# Patient Record
Sex: MALE | Race: WHITE | Employment: OTHER | URBAN - METROPOLITAN AREA
[De-identification: names, ages, dates, MRNs, and addresses within clinical notes are randomized per-mention and may not be internally consistent; named-entity substitution may affect disease eponyms.]

---

## 2017-06-08 ENCOUNTER — HOSPITAL ENCOUNTER (OUTPATIENT)
Dept: PHYSICAL THERAPY | Age: 37
Discharge: HOME | End: 2017-06-30
Attending: FAMILY MEDICINE | Admitting: FAMILY MEDICINE

## 2017-06-08 ASSESSMENT — PAIN SCALES - GENERAL: PAINLEVEL_OUTOF10: 6

## 2017-06-08 ASSESSMENT — PAIN DESCRIPTION - DESCRIPTORS: DESCRIPTORS: TINGLING;BURNING

## 2017-06-08 ASSESSMENT — PAIN DESCRIPTION - FREQUENCY: FREQUENCY: INTERMITTENT

## 2017-06-08 ASSESSMENT — PAIN DESCRIPTION - PAIN TYPE: TYPE: ACUTE PAIN

## 2017-06-08 ASSESSMENT — PAIN DESCRIPTION - LOCATION: LOCATION: KNEE;ANKLE;LEG

## 2017-06-08 ASSESSMENT — PAIN DESCRIPTION - ORIENTATION: ORIENTATION: LEFT

## 2017-06-20 ENCOUNTER — HOSPITAL ENCOUNTER (OUTPATIENT)
Dept: PHYSICAL THERAPY | Age: 37
Discharge: HOME | End: 2017-06-20
Admitting: FAMILY MEDICINE

## 2017-07-01 ENCOUNTER — HOSPITAL ENCOUNTER (OUTPATIENT)
Dept: OTHER | Age: 37
Discharge: HOME | End: 2017-07-31
Attending: FAMILY MEDICINE | Admitting: FAMILY MEDICINE

## 2017-08-01 ENCOUNTER — HOSPITAL ENCOUNTER (OUTPATIENT)
Dept: OTHER | Age: 37
Discharge: HOME | End: 2017-08-17
Attending: FAMILY MEDICINE | Admitting: FAMILY MEDICINE

## 2017-10-27 ENCOUNTER — HOSPITAL ENCOUNTER (OUTPATIENT)
Dept: MRI IMAGING | Age: 37
Discharge: HOME | End: 2017-10-27
Attending: PHYSICIAN ASSISTANT | Admitting: PHYSICIAN ASSISTANT

## 2017-10-27 DIAGNOSIS — M54.5 ACUTE LEFT-SIDED LOW BACK PAIN, WITH SCIATICA PRESENCE UNSPECIFIED: ICD-10-CM

## 2017-10-27 DIAGNOSIS — M54.16 LUMBAR RADICULOPATHY: ICD-10-CM

## 2018-02-06 ENCOUNTER — TELEPHONE (OUTPATIENT)
Dept: NEUROSURGERY | Age: 38
End: 2018-02-06

## 2018-02-22 PROBLEM — I25.9 CHEST PAIN DUE TO MYOCARDIAL ISCHEMIA: Status: ACTIVE | Noted: 2018-02-22

## 2018-02-27 PROBLEM — I20.89 ANGINA EFFORT: Status: ACTIVE | Noted: 2018-02-27

## 2018-02-27 PROBLEM — I20.8 ANGINA EFFORT: Status: ACTIVE | Noted: 2018-02-27

## 2018-02-28 PROBLEM — I20.8 ANGINA EFFORT: Status: RESOLVED | Noted: 2018-02-27 | Resolved: 2018-02-28

## 2018-02-28 PROBLEM — I20.89 ANGINA EFFORT: Status: RESOLVED | Noted: 2018-02-27 | Resolved: 2018-02-28

## 2018-02-28 PROBLEM — I25.9 CHEST PAIN DUE TO MYOCARDIAL ISCHEMIA: Status: RESOLVED | Noted: 2018-02-22 | Resolved: 2018-02-28

## 2018-03-08 ENCOUNTER — HOSPITAL ENCOUNTER (OUTPATIENT)
Dept: MRI IMAGING | Age: 38
Discharge: HOME | End: 2018-03-08
Payer: COMMERCIAL

## 2018-03-08 ENCOUNTER — OFFICE VISIT (OUTPATIENT)
Dept: NEUROSURGERY | Age: 38
End: 2018-03-08
Payer: COMMERCIAL

## 2018-03-08 VITALS
DIASTOLIC BLOOD PRESSURE: 84 MMHG | HEIGHT: 72 IN | SYSTOLIC BLOOD PRESSURE: 120 MMHG | BODY MASS INDEX: 21.24 KG/M2 | WEIGHT: 156.8 LBS

## 2018-03-08 DIAGNOSIS — Z00.6 EXAMINATION FOR NORMAL COMPARISON OR CONTROL IN CLINICAL RESEARCH: ICD-10-CM

## 2018-03-08 DIAGNOSIS — M51.26 LUMBAR DISC HERNIATION: Primary | ICD-10-CM

## 2018-03-08 PROCEDURE — 3209999900 MRI COMPARISON OF OUTSIDE FILMS

## 2018-03-08 PROCEDURE — 99202 OFFICE O/P NEW SF 15 MIN: CPT | Performed by: NEUROLOGICAL SURGERY

## 2018-03-08 NOTE — PROGRESS NOTES
40year old who since May started to complain of pain and numbness in left leg, now also spreading to right leg. Pain/numbness is worse by standing/walking/sitting/lying down and is continuous. Patient has very physical job at [address] [place] [address]; he kept working through the pain until 2 weeks ago. Took Gabapentin and Tylenol with little relief  Had PT to no avail. MRI 10-27-17 shows large HNP at L5/S1 left  NE is positive for 3/5 weakness in the S1 innervated muscles (Gastroc/soleus/hamstring) left, decreased sensation S1 dermatome left. He has a + SLRT at 20 degrees left and 40 degrees right. No DTR in both legs (pt has diabetes)  WE recommend surgery to remove the HNP. I showed the MRI to the patient. We discussed the possible complication of surgery including excessive blood loss requiring transfusion, infection that may become meningitis, problem with heart lung and kidney as a result of general anesthesia such as heart attack, pneumonia, kidney shutdown and stroke. We also discussed the possible complication of the operations in and around the spine such as, death, paralysis, weakness on one side or the other of the body, decreased sensation or pain on one side or the other of the body, inability to control bowel/bladder, problems with sexual function, postoperative meningitis, postoperative development of a blood clot that may require another operation, leakage of fluid from the wound that may require another operation. The unlikely event of blindness following surgery in the prone position was also discussed with the patient. The patient understands that no guarantee can be made regarding the extent of post-operative pain relief. Patient agrees to undergo surgery and signed the surgical consent  1) Repeat MRI.  The previous one is > 4 months old  2)CT L-S spine  3) F/ex L-S spine  4)Cardiology clearance  5) Once OR is scheduled patient has been instructed to stop taking the aspirin 7 days prior and not to take any

## 2018-03-13 PROBLEM — I20.0 UNSTABLE ANGINA (HCC): Status: ACTIVE | Noted: 2018-03-13

## 2018-03-14 ENCOUNTER — TELEPHONE (OUTPATIENT)
Dept: NEUROSURGERY | Age: 38
End: 2018-03-14

## 2018-03-14 NOTE — TELEPHONE ENCOUNTER
Xavier Salinas spoke to [facility] MRI Lumbar spine wo contrast and Ct Lumbar spine wo contrast is pending further clinical review is needed. Case number [id number].

## 2018-03-15 ENCOUNTER — TELEPHONE (OUTPATIENT)
Dept: NEUROSURGERY | Age: 38
End: 2018-03-15

## 2018-03-15 NOTE — TELEPHONE ENCOUNTER
Scheduled pt for surgery with Dr. Daneil Camara. Eliecer Aranda on 4/17/18 at 7:30am with an arrival to [facility] at 5:30m. Informed pt NPO after midnight the night before, shower with antibacterial soap and stop taking ASA 5-7 day prior to surgery. I also informed pt if he has any questions to give the office a call. Pt verbalized understanding.

## 2018-03-16 ENCOUNTER — TELEPHONE (OUTPATIENT)
Dept: NEUROSURGERY | Age: 38
End: 2018-03-16

## 2018-04-03 ENCOUNTER — PREP FOR PROCEDURE (OUTPATIENT)
Dept: NEUROSURGERY | Age: 38
End: 2018-04-03

## 2018-04-03 ENCOUNTER — HOSPITAL ENCOUNTER (OUTPATIENT)
Dept: MRI IMAGING | Age: 38
Discharge: HOME | End: 2018-04-03
Attending: NURSE PRACTITIONER | Admitting: NURSE PRACTITIONER

## 2018-04-03 DIAGNOSIS — M51.26 LUMBAR DISC HERNIATION: ICD-10-CM

## 2018-04-03 RX ORDER — SODIUM CHLORIDE 0.9 % (FLUSH) 0.9 %
10 SYRINGE (ML) INJECTION PRN
Status: CANCELLED | OUTPATIENT
Start: 2018-04-03

## 2018-04-03 RX ORDER — SODIUM CHLORIDE 0.9 % (FLUSH) 0.9 %
10 SYRINGE (ML) INJECTION EVERY 12 HOURS SCHEDULED
Status: CANCELLED | OUTPATIENT
Start: 2018-04-03

## 2018-04-03 RX ORDER — SODIUM CHLORIDE 9 MG/ML
INJECTION, SOLUTION INTRAVENOUS CONTINUOUS
Status: CANCELLED | OUTPATIENT
Start: 2018-04-03

## 2018-04-10 RX ORDER — OMEPRAZOLE 20 MG/1
20 CAPSULE, DELAYED RELEASE ORAL EVERY MORNING
COMMUNITY
End: 2019-07-10 | Stop reason: SDUPTHER

## 2018-04-12 ENCOUNTER — PREP FOR PROCEDURE (OUTPATIENT)
Dept: NEUROSURGERY | Age: 38
End: 2018-04-12

## 2018-04-17 ENCOUNTER — APPOINTMENT (OUTPATIENT)
Dept: GENERAL RADIOLOGY | Age: 38
End: 2018-04-17
Attending: NEUROLOGICAL SURGERY
Payer: COMMERCIAL

## 2018-04-17 ENCOUNTER — ANESTHESIA EVENT (OUTPATIENT)
Dept: OPERATING ROOM | Age: 38
End: 2018-04-17
Payer: COMMERCIAL

## 2018-04-17 ENCOUNTER — HOSPITAL ENCOUNTER (OUTPATIENT)
Age: 38
Discharge: HOME | End: 2018-04-18
Attending: NEUROLOGICAL SURGERY | Admitting: NEUROLOGICAL SURGERY
Payer: COMMERCIAL

## 2018-04-17 ENCOUNTER — ANESTHESIA (OUTPATIENT)
Dept: OPERATING ROOM | Age: 38
End: 2018-04-17
Payer: COMMERCIAL

## 2018-04-17 VITALS
TEMPERATURE: 99.5 F | RESPIRATION RATE: 13 BRPM | SYSTOLIC BLOOD PRESSURE: 104 MMHG | OXYGEN SATURATION: 99 % | DIASTOLIC BLOOD PRESSURE: 58 MMHG

## 2018-04-17 DIAGNOSIS — Z98.890 S/P LUMBAR LAMINECTOMY: Primary | ICD-10-CM

## 2018-04-17 DIAGNOSIS — M54.16 LUMBAR BACK PAIN WITH RADICULOPATHY AFFECTING LEFT LOWER EXTREMITY: ICD-10-CM

## 2018-04-17 LAB
ABO: NORMAL
ANTIBODY SCREEN: NORMAL
APTT: 29.8 SECONDS (ref 22–38)
GLUCOSE BLD-MCNC: 162 MG/DL (ref 70–108)
GLUCOSE BLD-MCNC: 180 MG/DL (ref 70–108)
GLUCOSE BLD-MCNC: 191 MG/DL (ref 70–108)
GLUCOSE BLD-MCNC: 199 MG/DL (ref 70–108)
INR BLD: 1.06 (ref 0.85–1.13)
POTASSIUM SERPL-SCNC: 4.1 MEQ/L (ref 3.5–5.2)
RH FACTOR: NORMAL

## 2018-04-17 PROCEDURE — 6360000002 HC RX W HCPCS: Performed by: NURSE PRACTITIONER

## 2018-04-17 PROCEDURE — 3600000015 HC SURGERY LEVEL 5 ADDTL 15MIN: Performed by: NEUROLOGICAL SURGERY

## 2018-04-17 PROCEDURE — 7100000000 HC PACU RECOVERY - FIRST 15 MIN: Performed by: NEUROLOGICAL SURGERY

## 2018-04-17 PROCEDURE — 2580000003 HC RX 258: Performed by: NEUROLOGICAL SURGERY

## 2018-04-17 PROCEDURE — 84132 ASSAY OF SERUM POTASSIUM: CPT

## 2018-04-17 PROCEDURE — 6360000002 HC RX W HCPCS: Performed by: NURSE ANESTHETIST, CERTIFIED REGISTERED

## 2018-04-17 PROCEDURE — 3209999900 FLUORO FOR SURGICAL PROCEDURES

## 2018-04-17 PROCEDURE — 86923 COMPATIBILITY TEST ELECTRIC: CPT

## 2018-04-17 PROCEDURE — 72100 X-RAY EXAM L-S SPINE 2/3 VWS: CPT

## 2018-04-17 PROCEDURE — 86850 RBC ANTIBODY SCREEN: CPT

## 2018-04-17 PROCEDURE — 2580000003 HC RX 258: Performed by: NURSE PRACTITIONER

## 2018-04-17 PROCEDURE — 82948 REAGENT STRIP/BLOOD GLUCOSE: CPT

## 2018-04-17 PROCEDURE — 2500000003 HC RX 250 WO HCPCS: Performed by: NURSE ANESTHETIST, CERTIFIED REGISTERED

## 2018-04-17 PROCEDURE — 2580000003 HC RX 258: Performed by: NURSE ANESTHETIST, CERTIFIED REGISTERED

## 2018-04-17 PROCEDURE — 85730 THROMBOPLASTIN TIME PARTIAL: CPT

## 2018-04-17 PROCEDURE — 86901 BLOOD TYPING SEROLOGIC RH(D): CPT

## 2018-04-17 PROCEDURE — 3700000001 HC ADD 15 MINUTES (ANESTHESIA): Performed by: NEUROLOGICAL SURGERY

## 2018-04-17 PROCEDURE — 88304 TISSUE EXAM BY PATHOLOGIST: CPT

## 2018-04-17 PROCEDURE — 3700000000 HC ANESTHESIA ATTENDED CARE: Performed by: NEUROLOGICAL SURGERY

## 2018-04-17 PROCEDURE — 86900 BLOOD TYPING SEROLOGIC ABO: CPT

## 2018-04-17 PROCEDURE — 2500000003 HC RX 250 WO HCPCS: Performed by: NEUROLOGICAL SURGERY

## 2018-04-17 PROCEDURE — C1713 ANCHOR/SCREW BN/BN,TIS/BN: HCPCS | Performed by: NEUROLOGICAL SURGERY

## 2018-04-17 PROCEDURE — 6360000002 HC RX W HCPCS

## 2018-04-17 PROCEDURE — 6370000000 HC RX 637 (ALT 250 FOR IP)

## 2018-04-17 PROCEDURE — 2780000010 HC IMPLANT OTHER: Performed by: NEUROLOGICAL SURGERY

## 2018-04-17 PROCEDURE — 2500000003 HC RX 250 WO HCPCS: Performed by: NURSE PRACTITIONER

## 2018-04-17 PROCEDURE — 36415 COLL VENOUS BLD VENIPUNCTURE: CPT

## 2018-04-17 PROCEDURE — 6370000000 HC RX 637 (ALT 250 FOR IP): Performed by: NURSE PRACTITIONER

## 2018-04-17 PROCEDURE — 95940 IONM IN OPERATNG ROOM 15 MIN: CPT | Performed by: NEUROLOGICAL SURGERY

## 2018-04-17 PROCEDURE — 2720000010 HC SURG SUPPLY STERILE: Performed by: NEUROLOGICAL SURGERY

## 2018-04-17 PROCEDURE — 3600000005 HC SURGERY LEVEL 5 BASE: Performed by: NEUROLOGICAL SURGERY

## 2018-04-17 PROCEDURE — 85610 PROTHROMBIN TIME: CPT

## 2018-04-17 PROCEDURE — 7100000001 HC PACU RECOVERY - ADDTL 15 MIN: Performed by: NEUROLOGICAL SURGERY

## 2018-04-17 DEVICE — SEALANT FIBRIN 4 CC FRZN PRE FILLED SYR TISSEEL: Type: IMPLANTABLE DEVICE | Site: BACK | Status: FUNCTIONAL

## 2018-04-17 DEVICE — FLOSEAL HEMOSTATIC MATRIX, 5 ML
Type: IMPLANTABLE DEVICE | Site: BACK | Status: FUNCTIONAL
Brand: FLOSEAL

## 2018-04-17 RX ORDER — MEPERIDINE HYDROCHLORIDE 25 MG/ML
12.5 INJECTION INTRAMUSCULAR; INTRAVENOUS; SUBCUTANEOUS EVERY 5 MIN PRN
Status: DISCONTINUED | OUTPATIENT
Start: 2018-04-17 | End: 2018-04-17 | Stop reason: HOSPADM

## 2018-04-17 RX ORDER — HYDROMORPHONE HCL 110MG/55ML
PATIENT CONTROLLED ANALGESIA SYRINGE INTRAVENOUS PRN
Status: DISCONTINUED | OUTPATIENT
Start: 2018-04-17 | End: 2018-04-17 | Stop reason: SDUPTHER

## 2018-04-17 RX ORDER — ONDANSETRON 2 MG/ML
4 INJECTION INTRAMUSCULAR; INTRAVENOUS
Status: DISCONTINUED | OUTPATIENT
Start: 2018-04-17 | End: 2018-04-17 | Stop reason: HOSPADM

## 2018-04-17 RX ORDER — GABAPENTIN 100 MG/1
100 CAPSULE ORAL 2 TIMES DAILY
Status: DISCONTINUED | OUTPATIENT
Start: 2018-04-17 | End: 2018-04-18 | Stop reason: HOSPADM

## 2018-04-17 RX ORDER — LABETALOL HYDROCHLORIDE 5 MG/ML
5 INJECTION, SOLUTION INTRAVENOUS EVERY 10 MIN PRN
Status: DISCONTINUED | OUTPATIENT
Start: 2018-04-17 | End: 2018-04-17 | Stop reason: HOSPADM

## 2018-04-17 RX ORDER — ONDANSETRON 2 MG/ML
4 INJECTION INTRAMUSCULAR; INTRAVENOUS ONCE
Status: COMPLETED | OUTPATIENT
Start: 2018-04-17 | End: 2018-04-17

## 2018-04-17 RX ORDER — SODIUM CHLORIDE 0.9 % (FLUSH) 0.9 %
10 SYRINGE (ML) INJECTION PRN
Status: DISCONTINUED | OUTPATIENT
Start: 2018-04-17 | End: 2018-04-18 | Stop reason: HOSPADM

## 2018-04-17 RX ORDER — OMEPRAZOLE 20 MG/1
20 CAPSULE, DELAYED RELEASE ORAL DAILY
Status: DISCONTINUED | OUTPATIENT
Start: 2018-04-17 | End: 2018-04-17 | Stop reason: CLARIF

## 2018-04-17 RX ORDER — SODIUM CHLORIDE 0.9 % (FLUSH) 0.9 %
10 SYRINGE (ML) INJECTION EVERY 12 HOURS SCHEDULED
Status: DISCONTINUED | OUTPATIENT
Start: 2018-04-17 | End: 2018-04-17 | Stop reason: HOSPADM

## 2018-04-17 RX ORDER — DEXAMETHASONE SODIUM PHOSPHATE 4 MG/ML
INJECTION, SOLUTION INTRA-ARTICULAR; INTRALESIONAL; INTRAMUSCULAR; INTRAVENOUS; SOFT TISSUE PRN
Status: DISCONTINUED | OUTPATIENT
Start: 2018-04-17 | End: 2018-04-17 | Stop reason: SDUPTHER

## 2018-04-17 RX ORDER — NEOSTIGMINE METHYLSULFATE 1 MG/ML
INJECTION, SOLUTION INTRAVENOUS PRN
Status: DISCONTINUED | OUTPATIENT
Start: 2018-04-17 | End: 2018-04-17 | Stop reason: SDUPTHER

## 2018-04-17 RX ORDER — OXYCODONE HYDROCHLORIDE 5 MG/1
5 TABLET ORAL EVERY 4 HOURS PRN
Status: DISCONTINUED | OUTPATIENT
Start: 2018-04-17 | End: 2018-04-18 | Stop reason: HOSPADM

## 2018-04-17 RX ORDER — PANTOPRAZOLE SODIUM 40 MG/1
40 TABLET, DELAYED RELEASE ORAL
Status: DISCONTINUED | OUTPATIENT
Start: 2018-04-17 | End: 2018-04-18 | Stop reason: HOSPADM

## 2018-04-17 RX ORDER — DOCUSATE SODIUM 100 MG/1
100 CAPSULE, LIQUID FILLED ORAL 2 TIMES DAILY
Status: DISCONTINUED | OUTPATIENT
Start: 2018-04-17 | End: 2018-04-18 | Stop reason: HOSPADM

## 2018-04-17 RX ORDER — PROPOFOL 10 MG/ML
INJECTION, EMULSION INTRAVENOUS PRN
Status: DISCONTINUED | OUTPATIENT
Start: 2018-04-17 | End: 2018-04-17 | Stop reason: SDUPTHER

## 2018-04-17 RX ORDER — LISINOPRIL AND HYDROCHLOROTHIAZIDE 20; 12.5 MG/1; MG/1
1 TABLET ORAL DAILY
Status: DISCONTINUED | OUTPATIENT
Start: 2018-04-17 | End: 2018-04-17 | Stop reason: CLARIF

## 2018-04-17 RX ORDER — SODIUM CHLORIDE 0.9 % (FLUSH) 0.9 %
10 SYRINGE (ML) INJECTION PRN
Status: DISCONTINUED | OUTPATIENT
Start: 2018-04-17 | End: 2018-04-17 | Stop reason: HOSPADM

## 2018-04-17 RX ORDER — SODIUM CHLORIDE, SODIUM LACTATE, POTASSIUM CHLORIDE, CALCIUM CHLORIDE 600; 310; 30; 20 MG/100ML; MG/100ML; MG/100ML; MG/100ML
INJECTION, SOLUTION INTRAVENOUS CONTINUOUS PRN
Status: DISCONTINUED | OUTPATIENT
Start: 2018-04-17 | End: 2018-04-17 | Stop reason: SDUPTHER

## 2018-04-17 RX ORDER — DEXTROSE, SODIUM CHLORIDE, AND POTASSIUM CHLORIDE 5; .45; .15 G/100ML; G/100ML; G/100ML
INJECTION INTRAVENOUS CONTINUOUS
Status: DISCONTINUED | OUTPATIENT
Start: 2018-04-17 | End: 2018-04-17

## 2018-04-17 RX ORDER — ONDANSETRON 2 MG/ML
INJECTION INTRAMUSCULAR; INTRAVENOUS
Status: COMPLETED
Start: 2018-04-17 | End: 2018-04-17

## 2018-04-17 RX ORDER — ROCURONIUM BROMIDE 10 MG/ML
INJECTION, SOLUTION INTRAVENOUS PRN
Status: DISCONTINUED | OUTPATIENT
Start: 2018-04-17 | End: 2018-04-17 | Stop reason: SDUPTHER

## 2018-04-17 RX ORDER — FENTANYL CITRATE 50 UG/ML
50 INJECTION, SOLUTION INTRAMUSCULAR; INTRAVENOUS
Status: DISCONTINUED | OUTPATIENT
Start: 2018-04-17 | End: 2018-04-18 | Stop reason: HOSPADM

## 2018-04-17 RX ORDER — FENTANYL CITRATE 50 UG/ML
INJECTION, SOLUTION INTRAMUSCULAR; INTRAVENOUS PRN
Status: DISCONTINUED | OUTPATIENT
Start: 2018-04-17 | End: 2018-04-17 | Stop reason: SDUPTHER

## 2018-04-17 RX ORDER — SODIUM CHLORIDE 9 MG/ML
INJECTION, SOLUTION INTRAVENOUS CONTINUOUS
Status: DISCONTINUED | OUTPATIENT
Start: 2018-04-17 | End: 2018-04-17

## 2018-04-17 RX ORDER — DIAZEPAM 5 MG/1
5 TABLET ORAL 2 TIMES DAILY
Status: DISCONTINUED | OUTPATIENT
Start: 2018-04-17 | End: 2018-04-18 | Stop reason: HOSPADM

## 2018-04-17 RX ORDER — LISINOPRIL 20 MG/1
20 TABLET ORAL DAILY
Status: DISCONTINUED | OUTPATIENT
Start: 2018-04-17 | End: 2018-04-18 | Stop reason: HOSPADM

## 2018-04-17 RX ORDER — PANTOPRAZOLE SODIUM 40 MG/1
40 TABLET, DELAYED RELEASE ORAL
Status: DISCONTINUED | OUTPATIENT
Start: 2018-04-17 | End: 2018-04-17 | Stop reason: SDUPTHER

## 2018-04-17 RX ORDER — SODIUM CHLORIDE 9 MG/ML
INJECTION, SOLUTION INTRAVENOUS CONTINUOUS PRN
Status: DISCONTINUED | OUTPATIENT
Start: 2018-04-17 | End: 2018-04-17 | Stop reason: SDUPTHER

## 2018-04-17 RX ORDER — NICOTINE 21 MG/24HR
1 PATCH, TRANSDERMAL 24 HOURS TRANSDERMAL DAILY
Status: DISCONTINUED | OUTPATIENT
Start: 2018-04-17 | End: 2018-04-18 | Stop reason: HOSPADM

## 2018-04-17 RX ORDER — LIDOCAINE HYDROCHLORIDE 20 MG/ML
INJECTION, SOLUTION INFILTRATION; PERINEURAL PRN
Status: DISCONTINUED | OUTPATIENT
Start: 2018-04-17 | End: 2018-04-17 | Stop reason: SDUPTHER

## 2018-04-17 RX ORDER — ACETAMINOPHEN 500 MG
1000 TABLET ORAL EVERY 6 HOURS
Status: DISCONTINUED | OUTPATIENT
Start: 2018-04-17 | End: 2018-04-18 | Stop reason: HOSPADM

## 2018-04-17 RX ORDER — SCOLOPAMINE TRANSDERMAL SYSTEM 1 MG/1
1 PATCH, EXTENDED RELEASE TRANSDERMAL ONCE
Status: DISCONTINUED | OUTPATIENT
Start: 2018-04-17 | End: 2018-04-18 | Stop reason: HOSPADM

## 2018-04-17 RX ORDER — GLYCOPYRROLATE 1 MG/5 ML
SYRINGE (ML) INTRAVENOUS PRN
Status: DISCONTINUED | OUTPATIENT
Start: 2018-04-17 | End: 2018-04-17 | Stop reason: SDUPTHER

## 2018-04-17 RX ORDER — SCOLOPAMINE TRANSDERMAL SYSTEM 1 MG/1
PATCH, EXTENDED RELEASE TRANSDERMAL
Status: DISCONTINUED
Start: 2018-04-17 | End: 2018-04-18 | Stop reason: HOSPADM

## 2018-04-17 RX ORDER — HYDROCHLOROTHIAZIDE 12.5 MG/1
12.5 CAPSULE, GELATIN COATED ORAL DAILY
Status: DISCONTINUED | OUTPATIENT
Start: 2018-04-17 | End: 2018-04-18 | Stop reason: HOSPADM

## 2018-04-17 RX ORDER — ONDANSETRON 2 MG/ML
4 INJECTION INTRAMUSCULAR; INTRAVENOUS EVERY 6 HOURS PRN
Status: DISCONTINUED | OUTPATIENT
Start: 2018-04-17 | End: 2018-04-18 | Stop reason: HOSPADM

## 2018-04-17 RX ORDER — SODIUM CHLORIDE 0.9 % (FLUSH) 0.9 %
10 SYRINGE (ML) INJECTION EVERY 12 HOURS SCHEDULED
Status: DISCONTINUED | OUTPATIENT
Start: 2018-04-17 | End: 2018-04-18 | Stop reason: HOSPADM

## 2018-04-17 RX ORDER — MIDAZOLAM HYDROCHLORIDE 1 MG/ML
INJECTION INTRAMUSCULAR; INTRAVENOUS PRN
Status: DISCONTINUED | OUTPATIENT
Start: 2018-04-17 | End: 2018-04-17 | Stop reason: SDUPTHER

## 2018-04-17 RX ORDER — FENTANYL CITRATE 50 UG/ML
50 INJECTION, SOLUTION INTRAMUSCULAR; INTRAVENOUS EVERY 5 MIN PRN
Status: DISCONTINUED | OUTPATIENT
Start: 2018-04-17 | End: 2018-04-17 | Stop reason: HOSPADM

## 2018-04-17 RX ORDER — ONDANSETRON 2 MG/ML
INJECTION INTRAMUSCULAR; INTRAVENOUS PRN
Status: DISCONTINUED | OUTPATIENT
Start: 2018-04-17 | End: 2018-04-17 | Stop reason: SDUPTHER

## 2018-04-17 RX ADMIN — CEFAZOLIN SODIUM 2 G: 2 SOLUTION INTRAVENOUS at 08:15

## 2018-04-17 RX ADMIN — PHENYLEPHRINE HYDROCHLORIDE 100 MCG: 10 INJECTION INTRAMUSCULAR; INTRAVENOUS; SUBCUTANEOUS at 09:24

## 2018-04-17 RX ADMIN — SODIUM CHLORIDE: 9 INJECTION, SOLUTION INTRAVENOUS at 07:52

## 2018-04-17 RX ADMIN — PHENYLEPHRINE HYDROCHLORIDE 100 MCG: 10 INJECTION INTRAMUSCULAR; INTRAVENOUS; SUBCUTANEOUS at 09:12

## 2018-04-17 RX ADMIN — MIDAZOLAM HYDROCHLORIDE 2 MG: 1 INJECTION, SOLUTION INTRAMUSCULAR; INTRAVENOUS at 07:52

## 2018-04-17 RX ADMIN — SODIUM CHLORIDE, POTASSIUM CHLORIDE, SODIUM LACTATE AND CALCIUM CHLORIDE: 600; 310; 30; 20 INJECTION, SOLUTION INTRAVENOUS at 08:05

## 2018-04-17 RX ADMIN — NEOSTIGMINE METHYLSULFATE 3.5 MG: 1 INJECTION, SOLUTION INTRAVENOUS at 10:52

## 2018-04-17 RX ADMIN — PANTOPRAZOLE SODIUM 40 MG: 40 TABLET, DELAYED RELEASE ORAL at 13:38

## 2018-04-17 RX ADMIN — LIDOCAINE HYDROCHLORIDE 80 MG: 20 INJECTION, SOLUTION INFILTRATION; PERINEURAL at 07:56

## 2018-04-17 RX ADMIN — PHENYLEPHRINE HYDROCHLORIDE 100 MCG: 10 INJECTION INTRAMUSCULAR; INTRAVENOUS; SUBCUTANEOUS at 08:06

## 2018-04-17 RX ADMIN — PHENYLEPHRINE HYDROCHLORIDE 100 MCG: 10 INJECTION INTRAMUSCULAR; INTRAVENOUS; SUBCUTANEOUS at 08:41

## 2018-04-17 RX ADMIN — SODIUM CHLORIDE: 9 INJECTION, SOLUTION INTRAVENOUS at 08:30

## 2018-04-17 RX ADMIN — HYDROMORPHONE HYDROCHLORIDE 0.5 MG: 2 INJECTION INTRAMUSCULAR; INTRAVENOUS; SUBCUTANEOUS at 08:58

## 2018-04-17 RX ADMIN — Medication 0.6 MG: at 10:52

## 2018-04-17 RX ADMIN — DOCUSATE SODIUM 100 MG: 100 CAPSULE, LIQUID FILLED ORAL at 21:28

## 2018-04-17 RX ADMIN — Medication 50 MG: at 07:56

## 2018-04-17 RX ADMIN — OXYCODONE HYDROCHLORIDE 5 MG: 5 TABLET ORAL at 18:40

## 2018-04-17 RX ADMIN — SODIUM CHLORIDE: 9 INJECTION, SOLUTION INTRAVENOUS at 06:34

## 2018-04-17 RX ADMIN — ACETAMINOPHEN 1000 MG: 500 TABLET ORAL at 21:23

## 2018-04-17 RX ADMIN — FENTANYL CITRATE 100 MCG: 50 INJECTION INTRAMUSCULAR; INTRAVENOUS at 07:56

## 2018-04-17 RX ADMIN — FENTANYL CITRATE 50 MCG: 50 INJECTION INTRAMUSCULAR; INTRAVENOUS at 11:17

## 2018-04-17 RX ADMIN — DOCUSATE SODIUM 100 MG: 100 CAPSULE, LIQUID FILLED ORAL at 13:39

## 2018-04-17 RX ADMIN — FENTANYL CITRATE 50 MCG: 50 INJECTION INTRAMUSCULAR; INTRAVENOUS at 09:55

## 2018-04-17 RX ADMIN — POTASSIUM CHLORIDE, DEXTROSE MONOHYDRATE AND SODIUM CHLORIDE: 150; 5; 450 INJECTION, SOLUTION INTRAVENOUS at 13:58

## 2018-04-17 RX ADMIN — DIAZEPAM 5 MG: 5 TABLET ORAL at 21:23

## 2018-04-17 RX ADMIN — PROPOFOL 160 MG: 10 INJECTION, EMULSION INTRAVENOUS at 07:56

## 2018-04-17 RX ADMIN — ONDANSETRON 4 MG: 2 INJECTION INTRAMUSCULAR; INTRAVENOUS at 10:49

## 2018-04-17 RX ADMIN — GABAPENTIN 100 MG: 100 CAPSULE ORAL at 21:23

## 2018-04-17 RX ADMIN — DIAZEPAM 5 MG: 5 TABLET ORAL at 13:48

## 2018-04-17 RX ADMIN — HYDROMORPHONE HYDROCHLORIDE 0.5 MG: 2 INJECTION INTRAMUSCULAR; INTRAVENOUS; SUBCUTANEOUS at 11:17

## 2018-04-17 RX ADMIN — DEXAMETHASONE SODIUM PHOSPHATE 8 MG: 4 INJECTION, SOLUTION INTRAMUSCULAR; INTRAVENOUS at 08:20

## 2018-04-17 RX ADMIN — Medication 0.2 MG: at 07:56

## 2018-04-17 RX ADMIN — ONDANSETRON 4 MG: 2 INJECTION INTRAMUSCULAR; INTRAVENOUS at 07:24

## 2018-04-17 RX ADMIN — FENTANYL CITRATE 50 MCG: 50 INJECTION INTRAMUSCULAR; INTRAVENOUS at 08:58

## 2018-04-17 RX ADMIN — ACETAMINOPHEN 1000 MG: 500 TABLET ORAL at 13:38

## 2018-04-17 ASSESSMENT — PULMONARY FUNCTION TESTS
PIF_VALUE: 16
PIF_VALUE: 16
PIF_VALUE: 14
PIF_VALUE: 17
PIF_VALUE: 17
PIF_VALUE: 14
PIF_VALUE: 17
PIF_VALUE: 17
PIF_VALUE: 1
PIF_VALUE: 17
PIF_VALUE: 17
PIF_VALUE: 16
PIF_VALUE: 17
PIF_VALUE: 17
PIF_VALUE: 8
PIF_VALUE: 16
PIF_VALUE: 17
PIF_VALUE: 16
PIF_VALUE: 17
PIF_VALUE: 14
PIF_VALUE: 17
PIF_VALUE: 18
PIF_VALUE: 17
PIF_VALUE: 16
PIF_VALUE: 17
PIF_VALUE: 14
PIF_VALUE: 17
PIF_VALUE: 16
PIF_VALUE: 17
PIF_VALUE: 14
PIF_VALUE: 17
PIF_VALUE: 14
PIF_VALUE: 0
PIF_VALUE: 5
PIF_VALUE: 16
PIF_VALUE: 17
PIF_VALUE: 16
PIF_VALUE: 54
PIF_VALUE: 18
PIF_VALUE: 17
PIF_VALUE: 16
PIF_VALUE: 17
PIF_VALUE: 18
PIF_VALUE: 3
PIF_VALUE: 17
PIF_VALUE: 18
PIF_VALUE: 17
PIF_VALUE: 17
PIF_VALUE: 23
PIF_VALUE: 17
PIF_VALUE: 2
PIF_VALUE: 17
PIF_VALUE: 16
PIF_VALUE: 17
PIF_VALUE: 16
PIF_VALUE: 17
PIF_VALUE: 17
PIF_VALUE: 14
PIF_VALUE: 14
PIF_VALUE: 17
PIF_VALUE: 19
PIF_VALUE: 17
PIF_VALUE: 0
PIF_VALUE: 14
PIF_VALUE: 17
PIF_VALUE: 16
PIF_VALUE: 16
PIF_VALUE: 17
PIF_VALUE: 18
PIF_VALUE: 17
PIF_VALUE: 17
PIF_VALUE: 16
PIF_VALUE: 17
PIF_VALUE: 16
PIF_VALUE: 13
PIF_VALUE: 17
PIF_VALUE: 17
PIF_VALUE: 16
PIF_VALUE: 17
PIF_VALUE: 16
PIF_VALUE: 16
PIF_VALUE: 17
PIF_VALUE: 8
PIF_VALUE: 17
PIF_VALUE: 16
PIF_VALUE: 16
PIF_VALUE: 17
PIF_VALUE: 16
PIF_VALUE: 17
PIF_VALUE: 0
PIF_VALUE: 16
PIF_VALUE: 17
PIF_VALUE: 17
PIF_VALUE: 16
PIF_VALUE: 17
PIF_VALUE: 2
PIF_VALUE: 18
PIF_VALUE: 17
PIF_VALUE: 16
PIF_VALUE: 17
PIF_VALUE: 22
PIF_VALUE: 17
PIF_VALUE: 17
PIF_VALUE: 16
PIF_VALUE: 17
PIF_VALUE: 18

## 2018-04-17 ASSESSMENT — PAIN DESCRIPTION - PAIN TYPE
TYPE: SURGICAL PAIN
TYPE: SURGICAL PAIN

## 2018-04-17 ASSESSMENT — PAIN SCALES - GENERAL
PAINLEVEL_OUTOF10: 5
PAINLEVEL_OUTOF10: 7
PAINLEVEL_OUTOF10: 10
PAINLEVEL_OUTOF10: 10
PAINLEVEL_OUTOF10: 5
PAINLEVEL_OUTOF10: 7
PAINLEVEL_OUTOF10: 3
PAINLEVEL_OUTOF10: 6

## 2018-04-17 ASSESSMENT — PAIN DESCRIPTION - LOCATION
LOCATION: BACK
LOCATION: BACK

## 2018-04-17 ASSESSMENT — PAIN DESCRIPTION - DESCRIPTORS: DESCRIPTORS: ACHING

## 2018-04-17 ASSESSMENT — PAIN - FUNCTIONAL ASSESSMENT: PAIN_FUNCTIONAL_ASSESSMENT: 0-10

## 2018-04-18 VITALS
OXYGEN SATURATION: 99 % | RESPIRATION RATE: 18 BRPM | DIASTOLIC BLOOD PRESSURE: 75 MMHG | SYSTOLIC BLOOD PRESSURE: 117 MMHG | TEMPERATURE: 98 F | HEART RATE: 91 BPM | HEIGHT: 72 IN | BODY MASS INDEX: 21.77 KG/M2 | WEIGHT: 160.7 LBS

## 2018-04-18 PROBLEM — Z98.890 S/P LUMBAR LAMINECTOMY: Status: ACTIVE | Noted: 2018-04-18

## 2018-04-18 LAB
GLUCOSE BLD-MCNC: 156 MG/DL (ref 70–108)
GLUCOSE BLD-MCNC: 158 MG/DL (ref 70–108)

## 2018-04-18 PROCEDURE — 96361 HYDRATE IV INFUSION ADD-ON: CPT

## 2018-04-18 PROCEDURE — 6370000000 HC RX 637 (ALT 250 FOR IP): Performed by: NEUROLOGICAL SURGERY

## 2018-04-18 PROCEDURE — 6370000000 HC RX 637 (ALT 250 FOR IP): Performed by: NURSE PRACTITIONER

## 2018-04-18 PROCEDURE — 2580000003 HC RX 258: Performed by: NURSE PRACTITIONER

## 2018-04-18 PROCEDURE — 99024 POSTOP FOLLOW-UP VISIT: CPT | Performed by: NURSE PRACTITIONER

## 2018-04-18 PROCEDURE — 82948 REAGENT STRIP/BLOOD GLUCOSE: CPT

## 2018-04-18 PROCEDURE — 96360 HYDRATION IV INFUSION INIT: CPT

## 2018-04-18 RX ORDER — HYDROCHLOROTHIAZIDE 12.5 MG/1
12.5 CAPSULE, GELATIN COATED ORAL DAILY
Qty: 30 CAPSULE | Refills: 3 | Status: SHIPPED | OUTPATIENT
Start: 2018-04-18 | End: 2018-04-18 | Stop reason: HOSPADM

## 2018-04-18 RX ORDER — DIAZEPAM 5 MG/1
5 TABLET ORAL 2 TIMES DAILY
Qty: 20 TABLET | Refills: 0 | Status: SHIPPED | OUTPATIENT
Start: 2018-04-18 | End: 2018-04-28

## 2018-04-18 RX ORDER — OXYCODONE HYDROCHLORIDE 5 MG/1
5 TABLET ORAL EVERY 6 HOURS PRN
Qty: 28 TABLET | Refills: 0 | Status: SHIPPED | OUTPATIENT
Start: 2018-04-18 | End: 2018-04-25

## 2018-04-18 RX ADMIN — HYDROCHLOROTHIAZIDE 12.5 MG: 12.5 CAPSULE ORAL at 10:33

## 2018-04-18 RX ADMIN — DOCUSATE SODIUM 100 MG: 100 CAPSULE, LIQUID FILLED ORAL at 10:34

## 2018-04-18 RX ADMIN — LISINOPRIL 20 MG: 20 TABLET ORAL at 10:33

## 2018-04-18 RX ADMIN — METFORMIN HYDROCHLORIDE 1000 MG: 500 TABLET ORAL at 10:34

## 2018-04-18 RX ADMIN — GABAPENTIN 100 MG: 100 CAPSULE ORAL at 10:34

## 2018-04-18 RX ADMIN — ACETAMINOPHEN 1000 MG: 500 TABLET ORAL at 03:46

## 2018-04-18 RX ADMIN — ACETAMINOPHEN 1000 MG: 500 TABLET ORAL at 10:34

## 2018-04-18 RX ADMIN — Medication 10 ML: at 10:35

## 2018-04-18 RX ADMIN — PANTOPRAZOLE SODIUM 40 MG: 40 TABLET, DELAYED RELEASE ORAL at 10:34

## 2018-04-18 RX ADMIN — DIAZEPAM 5 MG: 5 TABLET ORAL at 10:34

## 2018-04-18 ASSESSMENT — PAIN SCALES - GENERAL
PAINLEVEL_OUTOF10: 2
PAINLEVEL_OUTOF10: 5

## 2018-04-19 ENCOUNTER — TELEPHONE (OUTPATIENT)
Dept: GASTROENTEROLOGY | Age: 38
End: 2018-04-19

## 2018-04-26 ENCOUNTER — OFFICE VISIT (OUTPATIENT)
Dept: NEUROSURGERY | Age: 38
End: 2018-04-26

## 2018-04-26 VITALS
DIASTOLIC BLOOD PRESSURE: 103 MMHG | HEART RATE: 101 BPM | SYSTOLIC BLOOD PRESSURE: 157 MMHG | BODY MASS INDEX: 21.26 KG/M2 | HEIGHT: 72 IN | WEIGHT: 157 LBS

## 2018-04-26 DIAGNOSIS — M51.26 LUMBAR DISC HERNIATION: Primary | ICD-10-CM

## 2018-04-26 DIAGNOSIS — M54.16 LUMBAR BACK PAIN WITH RADICULOPATHY AFFECTING LEFT LOWER EXTREMITY: ICD-10-CM

## 2018-04-26 DIAGNOSIS — Z98.890 S/P LUMBAR LAMINECTOMY: ICD-10-CM

## 2018-04-26 PROCEDURE — 99024 POSTOP FOLLOW-UP VISIT: CPT | Performed by: NEUROLOGICAL SURGERY

## 2018-04-26 RX ORDER — DOCUSATE SODIUM 100 MG/1
100 CAPSULE, LIQUID FILLED ORAL DAILY PRN
Qty: 30 CAPSULE | Refills: 0 | Status: SHIPPED | OUTPATIENT
Start: 2018-04-26 | End: 2018-05-26

## 2018-04-30 ENCOUNTER — TELEPHONE (OUTPATIENT)
Dept: NEUROSURGERY | Age: 38
End: 2018-04-30

## 2018-05-24 ENCOUNTER — OFFICE VISIT (OUTPATIENT)
Dept: NEUROSURGERY | Age: 38
End: 2018-05-24

## 2018-05-24 VITALS
BODY MASS INDEX: 20.94 KG/M2 | HEIGHT: 72 IN | DIASTOLIC BLOOD PRESSURE: 85 MMHG | HEART RATE: 77 BPM | SYSTOLIC BLOOD PRESSURE: 140 MMHG | WEIGHT: 154.6 LBS

## 2018-05-24 DIAGNOSIS — Z87.39 STATUS POST DISCECTOMY FOR HERNIATED NUCLEUS PULPOSUS: Primary | ICD-10-CM

## 2018-05-24 DIAGNOSIS — Z98.890 STATUS POST DISCECTOMY FOR HERNIATED NUCLEUS PULPOSUS: Primary | ICD-10-CM

## 2018-05-24 PROCEDURE — 99024 POSTOP FOLLOW-UP VISIT: CPT | Performed by: NEUROLOGICAL SURGERY

## 2018-05-25 ENCOUNTER — HOSPITAL ENCOUNTER (OUTPATIENT)
Dept: OTHER | Age: 38
Discharge: HOME | End: 2018-05-31
Attending: FAMILY MEDICINE | Admitting: FAMILY MEDICINE

## 2018-05-31 ENCOUNTER — HOSPITAL ENCOUNTER (OUTPATIENT)
Dept: PHYSICAL THERAPY | Age: 38
Discharge: HOME | End: 2018-05-31
Admitting: FAMILY MEDICINE

## 2018-06-01 ENCOUNTER — HOSPITAL ENCOUNTER (OUTPATIENT)
Dept: OTHER | Age: 38
Discharge: HOME | End: 2018-06-30
Attending: FAMILY MEDICINE | Admitting: FAMILY MEDICINE

## 2018-06-26 LAB
CHOLESTEROL, TOTAL: 117 MG/DL
CHOLESTEROL/HDL RATIO: NORMAL
CREATININE, URINE: 294.5
HDLC SERPL-MCNC: 35 MG/DL (ref 35–70)
LDL CHOLESTEROL CALCULATED: 58 MG/DL (ref 0–160)
MICROALBUMIN/CREAT 24H UR: 2920 MG/G{CREAT}
MICROALBUMIN/CREAT UR-RTO: 10
TRIGL SERPL-MCNC: 119 MG/DL
VLDLC SERPL CALC-MCNC: 24 MG/DL

## 2018-07-01 ENCOUNTER — HOSPITAL ENCOUNTER (OUTPATIENT)
Dept: OTHER | Age: 38
Discharge: HOME | End: 2018-07-31
Attending: FAMILY MEDICINE | Admitting: FAMILY MEDICINE

## 2018-07-05 ENCOUNTER — HOSPITAL ENCOUNTER (OUTPATIENT)
Dept: LAB | Age: 38
Discharge: HOME | End: 2018-07-05
Attending: PODIATRIST | Admitting: PODIATRIST

## 2018-07-05 LAB
ANION GAP SERPL CALCULATED.3IONS-SCNC: 10 MMOL/L (ref 4–16)
BASOPHILS ABSOLUTE: 0 K/CU MM
BASOPHILS RELATIVE PERCENT: 0.7 % (ref 0–1)
BUN BLDV-MCNC: 6 MG/DL (ref 6–23)
CALCIUM SERPL-MCNC: 9.3 MG/DL (ref 8.3–10.6)
CHLORIDE BLD-SCNC: 100 MMOL/L (ref 99–110)
CO2: 27 MMOL/L (ref 21–32)
CREAT SERPL-MCNC: 0.6 MG/DL (ref 0.9–1.3)
DIFFERENTIAL TYPE: ABNORMAL
EOSINOPHILS ABSOLUTE: 0.1 K/CU MM
EOSINOPHILS RELATIVE PERCENT: 1.8 % (ref 0–3)
ESTIMATED AVERAGE GLUCOSE: 146 MG/DL
GFR AFRICAN AMERICAN: >60 ML/MIN/1.73M2
GFR NON-AFRICAN AMERICAN: >60 ML/MIN/1.73M2
GLUCOSE BLD-MCNC: 171 MG/DL (ref 70–99)
HBA1C MFR BLD: 6.7 % (ref 4.2–6.3)
HCT VFR BLD CALC: 51.1 % (ref 42–52)
HEMOGLOBIN: 17.5 GM/DL (ref 13.5–18)
IMMATURE NEUTROPHIL %: 0.2 % (ref 0–0.43)
LYMPHOCYTES ABSOLUTE: 1.8 K/CU MM
LYMPHOCYTES RELATIVE PERCENT: 31.7 % (ref 24–44)
MCH RBC QN AUTO: 33.5 PG (ref 27–31)
MCHC RBC AUTO-ENTMCNC: 34.2 % (ref 32–36)
MCV RBC AUTO: 97.7 FL (ref 78–100)
MONOCYTES ABSOLUTE: 0.5 K/CU MM
MONOCYTES RELATIVE PERCENT: 9.3 % (ref 0–4)
NUCLEATED RBC %: 0 %
PDW BLD-RTO: 11.6 % (ref 11.7–14.9)
PLATELET # BLD: 307 K/CU MM (ref 140–440)
PMV BLD AUTO: 8.9 FL (ref 7.5–11.1)
POTASSIUM SERPL-SCNC: 4.6 MMOL/L (ref 3.5–5.1)
RBC # BLD: 5.23 M/CU MM (ref 4.6–6.2)
SEGMENTED NEUTROPHILS ABSOLUTE COUNT: 3.2 K/CU MM
SEGMENTED NEUTROPHILS RELATIVE PERCENT: 56.3 % (ref 36–66)
SODIUM BLD-SCNC: 137 MMOL/L (ref 135–145)
TOTAL IMMATURE NEUTOROPHIL: 0.01 K/CU MM
TOTAL NUCLEATED RBC: 0 K/CU MM
WBC # BLD: 5.7 K/CU MM (ref 4–10.5)

## 2018-11-19 ENCOUNTER — TELEPHONE (OUTPATIENT)
Dept: NEUROSURGERY | Age: 38
End: 2018-11-19

## 2019-05-10 ENCOUNTER — TELEPHONE (OUTPATIENT)
Dept: FAMILY MEDICINE CLINIC | Age: 39
End: 2019-05-10

## 2019-05-10 RX ORDER — LISINOPRIL AND HYDROCHLOROTHIAZIDE 20; 12.5 MG/1; MG/1
1 TABLET ORAL DAILY
Qty: 30 TABLET | Refills: 1 | Status: SHIPPED | OUTPATIENT
Start: 2019-05-10 | End: 2019-07-10 | Stop reason: SDUPTHER

## 2019-05-10 NOTE — TELEPHONE ENCOUNTER
----- Message from Idris Boudreaux sent at 5/10/2019  3:28 PM EDT -----  LISINOPRIL HCTZ 20-12.5 MG 1 QD   [facility] T  [phone]

## 2019-06-27 DIAGNOSIS — I10 ESSENTIAL HYPERTENSION: ICD-10-CM

## 2019-06-27 DIAGNOSIS — M54.16 LUMBAR RADICULOPATHY: ICD-10-CM

## 2019-07-10 ENCOUNTER — TELEPHONE (OUTPATIENT)
Dept: FAMILY MEDICINE CLINIC | Age: 39
End: 2019-07-10

## 2019-07-10 RX ORDER — OMEPRAZOLE 20 MG/1
20 CAPSULE, DELAYED RELEASE ORAL EVERY MORNING
Qty: 90 CAPSULE | Refills: 0 | Status: SHIPPED | OUTPATIENT
Start: 2019-07-10 | End: 2019-10-24 | Stop reason: SDUPTHER

## 2019-07-10 RX ORDER — LISINOPRIL AND HYDROCHLOROTHIAZIDE 20; 12.5 MG/1; MG/1
1 TABLET ORAL DAILY
Qty: 90 TABLET | Refills: 0 | Status: SHIPPED | OUTPATIENT
Start: 2019-07-10 | End: 2019-10-24 | Stop reason: SDUPTHER

## 2019-10-24 ENCOUNTER — TELEPHONE (OUTPATIENT)
Dept: FAMILY MEDICINE CLINIC | Age: 39
End: 2019-10-24

## 2019-10-24 RX ORDER — OMEPRAZOLE 20 MG/1
CAPSULE, DELAYED RELEASE ORAL
Qty: 30 CAPSULE | Refills: 0 | Status: SHIPPED | OUTPATIENT
Start: 2019-10-24 | End: 2019-11-20 | Stop reason: SDUPTHER

## 2019-10-24 RX ORDER — LISINOPRIL AND HYDROCHLOROTHIAZIDE 20; 12.5 MG/1; MG/1
TABLET ORAL
Qty: 30 TABLET | Refills: 0 | Status: SHIPPED | OUTPATIENT
Start: 2019-10-24 | End: 2019-11-20 | Stop reason: SDUPTHER

## 2019-10-24 RX ORDER — LISINOPRIL AND HYDROCHLOROTHIAZIDE 20; 12.5 MG/1; MG/1
TABLET ORAL
Qty: 30 TABLET | Refills: 0 | Status: SHIPPED | OUTPATIENT
Start: 2019-10-24 | End: 2019-10-24 | Stop reason: SDUPTHER

## 2019-10-24 RX ORDER — OMEPRAZOLE 20 MG/1
CAPSULE, DELAYED RELEASE ORAL
Qty: 30 CAPSULE | Refills: 0 | Status: SHIPPED | OUTPATIENT
Start: 2019-10-24 | End: 2019-10-24 | Stop reason: SDUPTHER

## 2019-11-20 ENCOUNTER — OFFICE VISIT (OUTPATIENT)
Dept: FAMILY MEDICINE CLINIC | Age: 39
End: 2019-11-20

## 2019-11-20 ENCOUNTER — TELEPHONE (OUTPATIENT)
Dept: FAMILY MEDICINE CLINIC | Age: 39
End: 2019-11-20

## 2019-11-20 VITALS
HEART RATE: 91 BPM | WEIGHT: 177.2 LBS | DIASTOLIC BLOOD PRESSURE: 82 MMHG | BODY MASS INDEX: 24.81 KG/M2 | SYSTOLIC BLOOD PRESSURE: 118 MMHG | OXYGEN SATURATION: 98 % | HEIGHT: 71 IN

## 2019-11-20 DIAGNOSIS — K21.9 GASTROESOPHAGEAL REFLUX DISEASE, ESOPHAGITIS PRESENCE NOT SPECIFIED: ICD-10-CM

## 2019-11-20 DIAGNOSIS — R07.89 CHEST HEAVINESS: ICD-10-CM

## 2019-11-20 DIAGNOSIS — E11.40 TYPE 2 DIABETES MELLITUS WITH DIABETIC NEUROPATHY, WITHOUT LONG-TERM CURRENT USE OF INSULIN (HCC): ICD-10-CM

## 2019-11-20 DIAGNOSIS — I10 ESSENTIAL HYPERTENSION: ICD-10-CM

## 2019-11-20 DIAGNOSIS — Z13.220 SCREENING FOR HYPERLIPIDEMIA: Primary | ICD-10-CM

## 2019-11-20 PROCEDURE — 99213 OFFICE O/P EST LOW 20 MIN: CPT | Performed by: FAMILY MEDICINE

## 2019-11-20 RX ORDER — OMEPRAZOLE 20 MG/1
CAPSULE, DELAYED RELEASE ORAL
Qty: 30 CAPSULE | Refills: 5 | Status: SHIPPED | OUTPATIENT
Start: 2019-11-20 | End: 2020-05-13 | Stop reason: SDUPTHER

## 2019-11-20 RX ORDER — LISINOPRIL AND HYDROCHLOROTHIAZIDE 20; 12.5 MG/1; MG/1
TABLET ORAL
Qty: 30 TABLET | Refills: 5 | Status: SHIPPED | OUTPATIENT
Start: 2019-11-20 | End: 2020-05-13 | Stop reason: SDUPTHER

## 2019-11-20 ASSESSMENT — ENCOUNTER SYMPTOMS
DIARRHEA: 0
VOMITING: 0
NAUSEA: 0
SHORTNESS OF BREATH: 1
ABDOMINAL PAIN: 0
BLOOD IN STOOL: 0
COUGH: 1

## 2019-11-20 ASSESSMENT — PATIENT HEALTH QUESTIONNAIRE - PHQ9
SUM OF ALL RESPONSES TO PHQ9 QUESTIONS 1 & 2: 0
2. FEELING DOWN, DEPRESSED OR HOPELESS: 0
1. LITTLE INTEREST OR PLEASURE IN DOING THINGS: 0
SUM OF ALL RESPONSES TO PHQ QUESTIONS 1-9: 0
SUM OF ALL RESPONSES TO PHQ QUESTIONS 1-9: 0

## 2020-03-11 ENCOUNTER — OFFICE VISIT (OUTPATIENT)
Dept: FAMILY MEDICINE CLINIC | Age: 40
End: 2020-03-11
Payer: MEDICAID

## 2020-03-11 VITALS
WEIGHT: 177.2 LBS | HEART RATE: 76 BPM | BODY MASS INDEX: 24.81 KG/M2 | HEIGHT: 71 IN | SYSTOLIC BLOOD PRESSURE: 130 MMHG | DIASTOLIC BLOOD PRESSURE: 84 MMHG

## 2020-03-11 PROCEDURE — 99214 OFFICE O/P EST MOD 30 MIN: CPT | Performed by: FAMILY MEDICINE

## 2020-03-11 ASSESSMENT — PATIENT HEALTH QUESTIONNAIRE - PHQ9
SUM OF ALL RESPONSES TO PHQ QUESTIONS 1-9: 1
2. FEELING DOWN, DEPRESSED OR HOPELESS: 1
SUM OF ALL RESPONSES TO PHQ9 QUESTIONS 1 & 2: 1
SUM OF ALL RESPONSES TO PHQ QUESTIONS 1-9: 1
1. LITTLE INTEREST OR PLEASURE IN DOING THINGS: 0

## 2020-03-11 NOTE — PROGRESS NOTES
3/11/2020    C[place]    Chief Complaint   Patient presents with   Shearon August Other     rov    Other     left groing intermittent discomfort, palpable \"protrusion\"    Other     round, brown spots ble. 1 year       HPI    Jerad is a 44 y.o. male who presents today with follow-up. Patient notes inability to pay for care and not having insurance. Patient notes bilateral foot pain. Patient has amazingly severe hammertoes and some pre-ulcerative calluses on his right foot. I reminded patient that his father had diabetic foot ulcers that led to a MRSA infection that got to his heart valve which was his reason for passing while I cared for him. Patient is well aware of this. Patient states he was at podiatry a year ago and states that the shoes and inserts have not been of much help. Patient does not have a list of sugars. He feels that his nutrition is pretty good although he only had a glass of orange juice today and no food. He lives with his mother who is a diabetic who generally does a pretty good job with her sugars. Patient denies hypoglycemic symptoms    Denies orthostasis. Pt without side effects of his bp meds. Notes compliance with bp meds. REVIEW OF SYSTEMS    Constitutional:  Denies fever, chills, weight loss or weakness  Eyes:  no photophobia or discharge  ENT:  no sore throat or ear pain  Cardiovascular:  Denies chest pain, palpitations or swelling  Respiratory:  Denies cough or shortness of breath  GI:  no abdominal pain, nausea, vomiting, or diarrhea. Very severe bilateral inguinal hernias. Not painful softly reproducible but difficult to lose move that amount of mass. Patient states he has had these for about a year. Musculoskeletal:  no back pain. Severe hammertoe deformities with significant toe calluses bilaterally. Right foot with severe pre-ulcerative calluses on the ventral surface over the metatarsals.   Skin:  No rashes  Neurologic:  no headache, focal weakness, or sensory changes  Endocrine:  no polyuria or polydipsia      PAST MEDICAL HISTORY  Past Medical History:   Diagnosis Date    Dizziness     Essential hypertension     Lumbar radiculopathy        FAMILY HISTORY  Family History   Problem Relation Age of Onset    Heart Disease Father     Diabetes Father     Diabetes Mother        SOCIAL HISTORY  Social History     Socioeconomic History    Marital status: Single     Spouse name: Not on file    Number of children: Not on file    Years of education: Not on file    Highest education level: Not on file   Occupational History    Not on file   Social Needs    Financial resource strain: Not on file    Food insecurity     Worry: Not on file     Inability: Not on file    Transportation needs     Medical: Not on file     Non-medical: Not on file   Tobacco Use    Smoking status: Never Smoker    Smokeless tobacco: Never Used   Substance and Sexual Activity    Alcohol use: Yes     Comment: \"couple beers a night\"    Drug use: No    Sexual activity: Yes     Partners: Female   Lifestyle    Physical activity     Days per week: Not on file     Minutes per session: Not on file    Stress: Not on file   Relationships    Social connections     Talks on phone: Not on file     Gets together: Not on file     Attends Nondenominational service: Not on file     Active member of club or organization: Not on file     Attends meetings of clubs or organizations: Not on file     Relationship status: Not on file    Intimate partner violence     Fear of current or ex partner: Not on file     Emotionally abused: Not on file     Physically abused: Not on file     Forced sexual activity: Not on file   Other Topics Concern    Not on file   Social History Narrative    Not on file        SURGICAL HISTORY  Past Surgical History:   Procedure Laterality Date    CARDIAC CATHETERIZATION  03/2018    [facility]    DENTAL SURGERY      teeth extractions    LUMBAR LAMINECTOMY      [place] OFFICE/OUTPT VISIT,PROCEDURE ONLY Left 4/17/2018    L5-S1 HEMILAMINECTOMY, REMOVAL OF DISC LEFT SIDE performed by Maxx Foster MD at [address]  Current Outpatient Medications   Medication Sig Dispense Refill    aspirin 81 MG tablet Take 81 mg by mouth daily      omeprazole (PRILOSEC) 20 MG delayed release capsule TAKE 1 CAPSULE BY MOUTH ONCE DAILY IN THE MORNING 30 capsule 5    lisinopril-hydrochlorothiazide (PRINZIDE;ZESTORETIC) 20-12.5 MG per tablet TAKE 1 TABLET BY MOUTH ONCE DAILY 30 tablet 5    metFORMIN (GLUCOPHAGE) 500 MG tablet Take 2 tablets by mouth 2 times daily (with meals) 30 tablet 5    acetaminophen (TYLENOL) 325 MG tablet Take 2 tablets by mouth every 4 hours as needed for Pain or Fever 120 tablet 3     No current facility-administered medications for this visit. ALLERGIES  No Known Allergies    PHYSICAL EXAM    /84   Pulse 76   Ht 5' 11\" (1.803 m)   Wt 177 lb 3.2 oz (80.4 kg)   BMI 24.71 kg/m²     Constitutional:  Well developed, well nourished  HEENT:  Normocephalic, atraumatic, bilateral external ears normal, oropharynx moist, nose normal  Neck:  Normal range of motion, no tenderness, supple  Lymphatic:  No lymphadenopathy noted  Cardiovascular:  Normal heart rate, S1S2 nl  Thorax & Lungs:  Normal breath sounds, no respiratory distress, no wheezing  Skin:  Warm, dry, no erythema, no rash  Back:  straight  Extremities:  No edema, no tenderness, no cyanosis  Musculoskeletal:  Good range of motion   Neurologic:  Alert & oriented X 3      ASSESSMENT & PLAN    1. Type 2 diabetes mellitus without complication, without long-term current use of insulin ([facility])  Probably not good control. Issue is stable check labs today. Adjust medication off of lab results. -  DIABETES FOOT EXAM  - Hemoglobin A1C; Future    2. Essential hypertension  Possibly controlled.   Insert controlled  - CBC Auto Differential; Future  - Comprehensive Metabolic

## 2020-03-12 DIAGNOSIS — I10 ESSENTIAL HYPERTENSION: ICD-10-CM

## 2020-03-12 DIAGNOSIS — E78.2 MIXED HYPERLIPIDEMIA: ICD-10-CM

## 2020-03-12 DIAGNOSIS — E11.9 TYPE 2 DIABETES MELLITUS WITHOUT COMPLICATION, WITHOUT LONG-TERM CURRENT USE OF INSULIN (HCC): ICD-10-CM

## 2020-03-12 LAB
A/G RATIO: 1.4 (ref 1.1–2.2)
ALBUMIN SERPL-MCNC: 4.6 G/DL (ref 3.4–5)
ALP BLD-CCNC: 46 U/L (ref 40–129)
ALT SERPL-CCNC: 41 U/L (ref 10–40)
ANION GAP SERPL CALCULATED.3IONS-SCNC: 13 MMOL/L (ref 3–16)
AST SERPL-CCNC: 29 U/L (ref 15–37)
BASOPHILS ABSOLUTE: 0 K/UL (ref 0–0.2)
BASOPHILS RELATIVE PERCENT: 0.5 %
BILIRUB SERPL-MCNC: 0.9 MG/DL (ref 0–1)
BUN BLDV-MCNC: 10 MG/DL (ref 7–20)
CALCIUM SERPL-MCNC: 9.8 MG/DL (ref 8.3–10.6)
CHLORIDE BLD-SCNC: 96 MMOL/L (ref 99–110)
CHOLESTEROL, TOTAL: 142 MG/DL (ref 0–199)
CO2: 30 MMOL/L (ref 21–32)
CREAT SERPL-MCNC: 0.7 MG/DL (ref 0.9–1.3)
EOSINOPHILS ABSOLUTE: 0.1 K/UL (ref 0–0.6)
EOSINOPHILS RELATIVE PERCENT: 2.2 %
GFR AFRICAN AMERICAN: >60
GFR NON-AFRICAN AMERICAN: >60
GLOBULIN: 3.2 G/DL
GLUCOSE BLD-MCNC: 214 MG/DL (ref 70–99)
HCT VFR BLD CALC: 45.7 % (ref 40.5–52.5)
HDLC SERPL-MCNC: 33 MG/DL (ref 40–60)
HEMOGLOBIN: 15.7 G/DL (ref 13.5–17.5)
LDL CHOLESTEROL CALCULATED: 60 MG/DL
LYMPHOCYTES ABSOLUTE: 2.7 K/UL (ref 1–5.1)
LYMPHOCYTES RELATIVE PERCENT: 41.9 %
MCH RBC QN AUTO: 32.7 PG (ref 26–34)
MCHC RBC AUTO-ENTMCNC: 34.4 G/DL (ref 31–36)
MCV RBC AUTO: 95 FL (ref 80–100)
MONOCYTES ABSOLUTE: 0.7 K/UL (ref 0–1.3)
MONOCYTES RELATIVE PERCENT: 11.5 %
NEUTROPHILS ABSOLUTE: 2.8 K/UL (ref 1.7–7.7)
NEUTROPHILS RELATIVE PERCENT: 43.9 %
PDW BLD-RTO: 12.7 % (ref 12.4–15.4)
PLATELET # BLD: 302 K/UL (ref 135–450)
PMV BLD AUTO: 8.8 FL (ref 5–10.5)
POTASSIUM SERPL-SCNC: 4.6 MMOL/L (ref 3.5–5.1)
RBC # BLD: 4.81 M/UL (ref 4.2–5.9)
SODIUM BLD-SCNC: 139 MMOL/L (ref 136–145)
TOTAL PROTEIN: 7.8 G/DL (ref 6.4–8.2)
TRIGL SERPL-MCNC: 247 MG/DL (ref 0–150)
VLDLC SERPL CALC-MCNC: 49 MG/DL
WBC # BLD: 6.3 K/UL (ref 4–11)

## 2020-03-13 ENCOUNTER — TELEPHONE (OUTPATIENT)
Dept: BARIATRICS/WEIGHT MGMT | Age: 40
End: 2020-03-13

## 2020-03-13 LAB
ESTIMATED AVERAGE GLUCOSE: 266.1 MG/DL
HBA1C MFR BLD: 10.9 %

## 2020-03-17 ENCOUNTER — OFFICE VISIT (OUTPATIENT)
Dept: BARIATRICS/WEIGHT MGMT | Age: 40
End: 2020-03-17
Payer: MEDICAID

## 2020-03-17 ENCOUNTER — TELEPHONE (OUTPATIENT)
Dept: FAMILY MEDICINE CLINIC | Age: 40
End: 2020-03-17

## 2020-03-17 VITALS
BODY MASS INDEX: 23.93 KG/M2 | HEART RATE: 98 BPM | HEIGHT: 72 IN | DIASTOLIC BLOOD PRESSURE: 68 MMHG | WEIGHT: 176.7 LBS | SYSTOLIC BLOOD PRESSURE: 118 MMHG

## 2020-03-17 PROCEDURE — 99203 OFFICE O/P NEW LOW 30 MIN: CPT | Performed by: SURGERY

## 2020-03-17 RX ORDER — GLYBURIDE 5 MG/1
5 TABLET ORAL
Qty: 60 TABLET | Refills: 5 | Status: SHIPPED | OUTPATIENT
Start: 2020-03-17 | End: 2020-05-13 | Stop reason: SDUPTHER

## 2020-03-17 ASSESSMENT — ENCOUNTER SYMPTOMS
EYES NEGATIVE: 1
ALLERGIC/IMMUNOLOGIC NEGATIVE: 1
RESPIRATORY NEGATIVE: 1

## 2020-03-17 NOTE — PROGRESS NOTES
Chief Complaint   Patient presents with   Aida Angel Patient     [facility] Ref Dr. Moe Romero:  HPI: Patient presents for evaluation of bilateral inguinal hernia. Symptoms were first noted several years ago. Pain is intermittent--worse at end of the day after work. 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 Lump is reducible. Pt denies obstructive symptoms. Pt with no previous history of abdominal surgery. Pt states he delayed repair bc he initially did not have insurance. Currently without other complaints. He takes medications for DM, HTN, and ASA 81 mg.     I have reviewed the patient's(pertinent information to this visit) medical history, family history(scanned in  theMedia tab under \"patient questioner\"), social history and review of systems with the patient today in the office.        Past Surgical History:   Procedure Laterality Date    [facility]    DENTAL SURGERY      teeth extractions    LUMBAR LAMINECTOMY      [place] OFFICE/OUTPT VISIT,PROCEDURE ONLY Left 4/17/2018    L5-S1 HEMILAMINECTOMY, REMOVAL OF DISC LEFT SIDE performed by Enoch Guadalupe MD at [address] [facility]       Past Medical History:   Diagnosis Date    Dizziness     Essential hypertension     Lumbar radiculopathy         Family History   Problem Relation Age of Onset    Heart Disease Father     Diabetes Father     Diabetes Mother      Social History     Socioeconomic History    Marital status: Single     Spouse name: Not on file    Number of children: Not on file    Years of education: Not on file    Highest education level: Not on file   Occupational History    Not on file   Social Needs    Financial resource strain: Not on file    Food insecurity     Worry: Not on file     Inability: Not on file    Transportation needs     Medical: Not on file     Non-medical: Not on file   Tobacco Use    Smoking status: Never Smoker    Smokeless tobacco: encounter. No orders of the defined types were placed in this encounter. Follow Up:  No follow-ups on file.       Amanda Arellano MD

## 2020-04-09 ENCOUNTER — HOSPITAL ENCOUNTER (OUTPATIENT)
Age: 40
Discharge: HOME | End: 2020-04-09
Payer: COMMERCIAL

## 2020-04-09 LAB
ANION GAP SERPL CALCULATED.3IONS-SCNC: 11 MMOL/L (ref 4–16)
BASOPHILS ABSOLUTE: 0 K/CU MM
BASOPHILS RELATIVE PERCENT: 0.6 % (ref 0–1)
BUN BLDV-MCNC: 7 MG/DL (ref 6–23)
CALCIUM SERPL-MCNC: 8.9 MG/DL (ref 8.3–10.6)
CHLORIDE BLD-SCNC: 93 MMOL/L (ref 99–110)
CO2: 25 MMOL/L (ref 21–32)
CREAT SERPL-MCNC: 0.6 MG/DL (ref 0.9–1.3)
DIFFERENTIAL TYPE: ABNORMAL
EOSINOPHILS ABSOLUTE: 0.2 K/CU MM
EOSINOPHILS RELATIVE PERCENT: 2.5 % (ref 0–3)
ERYTHROCYTE SEDIMENTATION RATE: 22 MM/HR (ref 0–15)
GFR AFRICAN AMERICAN: >60 ML/MIN/1.73M2
GFR NON-AFRICAN AMERICAN: >60 ML/MIN/1.73M2
GLUCOSE BLD-MCNC: 288 MG/DL (ref 70–99)
HCT VFR BLD CALC: 43.6 % (ref 42–52)
HEMOGLOBIN: 15.1 GM/DL (ref 13.5–18)
IMMATURE NEUTROPHIL %: 0.3 % (ref 0–0.43)
LYMPHOCYTES ABSOLUTE: 2.1 K/CU MM
LYMPHOCYTES RELATIVE PERCENT: 30.6 % (ref 24–44)
MCH RBC QN AUTO: 32.1 PG (ref 27–31)
MCHC RBC AUTO-ENTMCNC: 34.6 % (ref 32–36)
MCV RBC AUTO: 92.6 FL (ref 78–100)
MONOCYTES ABSOLUTE: 0.6 K/CU MM
MONOCYTES RELATIVE PERCENT: 9.2 % (ref 0–4)
NUCLEATED RBC %: 0 %
PDW BLD-RTO: 11.6 % (ref 11.7–14.9)
PLATELET # BLD: 309 K/CU MM (ref 140–440)
PMV BLD AUTO: 9.4 FL (ref 7.5–11.1)
POTASSIUM SERPL-SCNC: 4 MMOL/L (ref 3.5–5.1)
RBC # BLD: 4.71 M/CU MM (ref 4.6–6.2)
SEGMENTED NEUTROPHILS ABSOLUTE COUNT: 3.8 K/CU MM
SEGMENTED NEUTROPHILS RELATIVE PERCENT: 56.8 % (ref 36–66)
SODIUM BLD-SCNC: 129 MMOL/L (ref 135–145)
TOTAL IMMATURE NEUTOROPHIL: 0.02 K/CU MM
TOTAL NUCLEATED RBC: 0 K/CU MM
WBC # BLD: 6.7 K/CU MM (ref 4–10.5)

## 2020-04-09 PROCEDURE — 85652 RBC SED RATE AUTOMATED: CPT

## 2020-04-09 PROCEDURE — 85025 COMPLETE CBC W/AUTO DIFF WBC: CPT

## 2020-04-09 PROCEDURE — 36415 COLL VENOUS BLD VENIPUNCTURE: CPT

## 2020-04-09 PROCEDURE — 80048 BASIC METABOLIC PNL TOTAL CA: CPT

## 2020-05-05 ENCOUNTER — TELEPHONE (OUTPATIENT)
Dept: BARIATRICS/WEIGHT MGMT | Age: 40
End: 2020-05-05

## 2020-05-11 PROBLEM — K21.9 GASTROESOPHAGEAL REFLUX DISEASE WITHOUT ESOPHAGITIS: Status: ACTIVE | Noted: 2020-05-11

## 2020-05-12 ENCOUNTER — TELEPHONE (OUTPATIENT)
Dept: BARIATRICS/WEIGHT MGMT | Age: 40
End: 2020-05-12

## 2020-05-13 ENCOUNTER — OFFICE VISIT (OUTPATIENT)
Dept: FAMILY MEDICINE CLINIC | Age: 40
End: 2020-05-13
Payer: COMMERCIAL

## 2020-05-13 VITALS
OXYGEN SATURATION: 98 % | SYSTOLIC BLOOD PRESSURE: 122 MMHG | HEART RATE: 118 BPM | BODY MASS INDEX: 25.22 KG/M2 | DIASTOLIC BLOOD PRESSURE: 86 MMHG | WEIGHT: 186.2 LBS | TEMPERATURE: 97.5 F | HEIGHT: 72 IN

## 2020-05-13 DIAGNOSIS — I10 ESSENTIAL HYPERTENSION: ICD-10-CM

## 2020-05-13 DIAGNOSIS — L97.418 DIABETIC ULCER OF RIGHT MIDFOOT ASSOCIATED WITH TYPE 2 DIABETES MELLITUS, WITH OTHER ULCER SEVERITY (HCC): ICD-10-CM

## 2020-05-13 DIAGNOSIS — E11.621 DIABETIC ULCER OF RIGHT MIDFOOT ASSOCIATED WITH TYPE 2 DIABETES MELLITUS, WITH OTHER ULCER SEVERITY (HCC): ICD-10-CM

## 2020-05-13 PROBLEM — L97.509 DIABETIC FOOT ULCER (HCC): Status: ACTIVE | Noted: 2020-05-13

## 2020-05-13 LAB
ANION GAP SERPL CALCULATED.3IONS-SCNC: 14 MMOL/L (ref 3–16)
BASOPHILS ABSOLUTE: 0 K/UL (ref 0–0.2)
BASOPHILS RELATIVE PERCENT: 0.4 %
BUN BLDV-MCNC: 10 MG/DL (ref 7–20)
CALCIUM SERPL-MCNC: 9.2 MG/DL (ref 8.3–10.6)
CHLORIDE BLD-SCNC: 91 MMOL/L (ref 99–110)
CO2: 28 MMOL/L (ref 21–32)
CREAT SERPL-MCNC: 0.7 MG/DL (ref 0.9–1.3)
EOSINOPHILS ABSOLUTE: 0.2 K/UL (ref 0–0.6)
EOSINOPHILS RELATIVE PERCENT: 2 %
GFR AFRICAN AMERICAN: >60
GFR NON-AFRICAN AMERICAN: >60
GLUCOSE BLD-MCNC: 305 MG/DL (ref 70–99)
HCT VFR BLD CALC: 46.4 % (ref 40.5–52.5)
HEMOGLOBIN: 15.9 G/DL (ref 13.5–17.5)
LYMPHOCYTES ABSOLUTE: 2.4 K/UL (ref 1–5.1)
LYMPHOCYTES RELATIVE PERCENT: 29.5 %
MCH RBC QN AUTO: 32.1 PG (ref 26–34)
MCHC RBC AUTO-ENTMCNC: 34.2 G/DL (ref 31–36)
MCV RBC AUTO: 93.7 FL (ref 80–100)
MONOCYTES ABSOLUTE: 0.9 K/UL (ref 0–1.3)
MONOCYTES RELATIVE PERCENT: 10.8 %
NEUTROPHILS ABSOLUTE: 4.7 K/UL (ref 1.7–7.7)
NEUTROPHILS RELATIVE PERCENT: 57.3 %
PDW BLD-RTO: 12.3 % (ref 12.4–15.4)
PLATELET # BLD: 315 K/UL (ref 135–450)
PMV BLD AUTO: 9.7 FL (ref 5–10.5)
POTASSIUM SERPL-SCNC: 4.6 MMOL/L (ref 3.5–5.1)
RBC # BLD: 4.95 M/UL (ref 4.2–5.9)
SEDIMENTATION RATE, ERYTHROCYTE: 15 MM/HR (ref 0–15)
SODIUM BLD-SCNC: 133 MMOL/L (ref 136–145)
WBC # BLD: 8.3 K/UL (ref 4–11)

## 2020-05-13 PROCEDURE — 99243 OFF/OP CNSLTJ NEW/EST LOW 30: CPT | Performed by: FAMILY MEDICINE

## 2020-05-13 PROCEDURE — 93000 ELECTROCARDIOGRAM COMPLETE: CPT | Performed by: FAMILY MEDICINE

## 2020-05-13 RX ORDER — GLYBURIDE 5 MG/1
5 TABLET ORAL 2 TIMES DAILY WITH MEALS
Qty: 180 TABLET | Refills: 1 | Status: ON HOLD | OUTPATIENT
Start: 2020-05-13 | End: 2020-06-15 | Stop reason: HOSPADM

## 2020-05-13 RX ORDER — LISINOPRIL AND HYDROCHLOROTHIAZIDE 20; 12.5 MG/1; MG/1
TABLET ORAL
Qty: 90 TABLET | Refills: 1 | Status: SHIPPED | OUTPATIENT
Start: 2020-05-13 | End: 2020-07-23 | Stop reason: SDUPTHER

## 2020-05-13 RX ORDER — OMEPRAZOLE 20 MG/1
CAPSULE, DELAYED RELEASE ORAL
Qty: 90 CAPSULE | Refills: 1 | Status: SHIPPED | OUTPATIENT
Start: 2020-05-13 | End: 2020-07-23 | Stop reason: SDUPTHER

## 2020-05-14 NOTE — PROGRESS NOTES
5[facility]13/2020    [facility]    Chief Complaint   Patient presents with    Other     no c/c. no refills requested at this time. eNo Palacios is a 44 y.o. male who presents today with visit for preop evaluation. Patient is interested and ready for his hernia surgery after the COVID break. He denies to chest pain or shortness of breath. He notes his sugars remain not in the best control between 225 and 250 despite starting high glyburide at 5 daily. He notes no sign of infection. He denies hypoglycemia. We reviewed some nutrition discussion. Recommended increasing his glyburide to 5 mg twice daily and remain on the same Glucophage. Increase exercise as possible. Patient continues to have work done by Dr. Efrain Hernandez. He has diabetic foot ulcer the gets cleaned every other week. I will recheck a CBC and a sed rate on that. Patient was found to have hyponatremia it was borderline and partially related to his sugars near 300. Patient was willing for labs today    Patient was near tachycardic for being arrested in the 90s. His EKG was unremarkable except for confirming the same in sinus rhythm.     REVIEW OF SYSTEMS    Constitutional:  Denies fever, chills, weight loss or weakness  Eyes:  no photophobia or discharge  ENT:  no sore throat or ear pain  Cardiovascular:  Denies chest pain, palpitations or swelling  Respiratory:  Denies cough or shortness of breath  GI:  no abdominal pain, nausea, vomiting, or diarrhea  Musculoskeletal:  no back pain  Skin:  No rashes  Neurologic:  no headache, focal weakness, or sensory changes  Endocrine:  no polyuria or polydipsia      PAST MEDICAL HISTORY  Past Medical History:   Diagnosis Date    Dizziness     Essential hypertension     Lumbar radiculopathy        FAMILY HISTORY  Family History   Problem Relation Age of Onset    Heart Disease Father     Diabetes Father     Diabetes Mother        SOCIAL HISTORY  Social History     Socioeconomic History    Marital status: Single     Spouse name: None    Number of children: None    Years of education: None    Highest education level: None   Occupational History    None   Social Needs    Financial resource strain: None    Food insecurity     Worry: None     Inability: None    Transportation needs     Medical: None     Non-medical: None   Tobacco Use    Smoking status: Never Smoker    Smokeless tobacco: Never Used   Substance and Sexual Activity    Alcohol use: Yes     Comment: \"couple beers a night\"    Drug use: No    Sexual activity: Yes     Partners: Female   Lifestyle    Physical activity     Days per week: None     Minutes per session: None    Stress: None   Relationships    Social connections     Talks on phone: None     Gets together: None     Attends Hinduism service: None     Active member of club or organization: None     Attends meetings of clubs or organizations: None     Relationship status: None    Intimate partner violence     Fear of current or ex partner: None     Emotionally abused: None     Physically abused: None     Forced sexual activity: None   Other Topics Concern    None   Social History Narrative    None        SURGICAL HISTORY  Past Surgical History:   Procedure Laterality Date    [facility]    DENTAL SURGERY      teeth extractions    LUMBAR LAMINECTOMY      [place] OFFICE/OUTPT VISIT,PROCEDURE ONLY Left 4/17/2018    L5-S1 HEMILAMINECTOMY, REMOVAL OF DISC LEFT SIDE performed by Sd Zambrano MD at [address] [facility]  Current Outpatient Medications   Medication Sig Dispense Refill    metFORMIN (GLUCOPHAGE) 500 MG tablet Take 2 tablets by mouth 2 times daily (with meals) 360 tablet 1    lisinopril-hydroCHLOROthiazide (PRINZIDE;ZESTORETIC) 20-12.5 MG per tablet TAKE 1 TABLET BY MOUTH ONCE DAILY 90 tablet 1    omeprazole (PRILOSEC) 20 MG delayed release capsule TAKE 1 CAPSULE BY severity ([facility] [address]  Check for improvement with a comparative CBC and sed rate  - Sedimentation Rate;  Future    Patient currently cleared for surgery pending labs    Patient follow-up after surgery and will continue to work on diabetes    Electronically signed by Valentin Johnson MD on 5/13/2020

## 2020-05-22 ENCOUNTER — ANESTHESIA EVENT (OUTPATIENT)
Dept: OPERATING ROOM | Age: 40
End: 2020-05-22
Payer: COMMERCIAL

## 2020-05-23 ENCOUNTER — HOSPITAL ENCOUNTER (OUTPATIENT)
Age: 40
Setting detail: SPECIMEN
Discharge: HOME | End: 2020-05-23
Payer: COMMERCIAL

## 2020-05-23 PROCEDURE — U0002 COVID-19 LAB TEST NON-CDC: HCPCS

## 2020-05-24 LAB
SARS-COV-2: NOT DETECTED
SOURCE: NORMAL

## 2020-05-26 NOTE — ANESTHESIA PRE PROCEDURE
hours as needed for Pain or Fever 120 tablet 3       Allergies:  No Known Allergies    Problem List:    Patient Active Problem List   Diagnosis Code    Unstable angina ([facility]) I20.0    Lumbar back pain with radiculopathy affecting left lower extremity M54.16    S/P lumbar laminectomy Z98.890    Type 2 diabetes mellitus with diabetic neuropathy, without long-term current use of insulin ([facility]) E11.40    Essential hypertension I10    Lumbar radiculopathy M54.16    Gastroesophageal reflux disease without esophagitis K21.9    Diabetic foot ulcer ([facility] [address] E11.621, L97.509       Past Medical History:        Diagnosis Date    Callus of foot     Right foot - see's Dr. Shawn Jones Diabetes mellitus ([facility] [address]     type II - follows with PCP    Dizziness     positional    Essential hypertension     Follows with PCP    GERD (gastroesophageal reflux disease)     Takes omeprazole    Lumbar radiculopathy        Past Surgical History:        Procedure Laterality Date    [place]  03/2018    [place]    DENTAL SURGERY      teeth extractions -half per patient    LUMBAR LAMINECTOMY  2018    [place] OFFICE/OUTPT VISIT,PROCEDURE ONLY Left 4/17/2018    L5-S1 HEMILAMINECTOMY, REMOVAL OF DISC LEFT SIDE performed by Cindy Wiley MD at [address] EXTRACTION         Social History:    Social History     Tobacco Use    Smoking status: Never Smoker    Smokeless tobacco: Never Used   Substance Use Topics    Alcohol use: Yes     Comment: \"couple beers a night\"                                Counseling given: Not Answered      Vital Signs (Current):   Vitals:    05/26/20 0928   Weight: 175 lb (79.4 kg)   Height: 6' (1.829 m)                                              BP Readings from Last 3 Encounters:   05/13/20 122/86   03/17/20 118/68   03/11/20 130/84       NPO Status:                                                                                 BMI:   Wt Readings from Last 3 Encounters:   05/13/20

## 2020-05-27 ENCOUNTER — HOSPITAL ENCOUNTER (OUTPATIENT)
Age: 40
Setting detail: OUTPATIENT SURGERY
Discharge: HOME | End: 2020-05-27
Attending: SURGERY | Admitting: SURGERY
Payer: COMMERCIAL

## 2020-05-27 ENCOUNTER — ANESTHESIA (OUTPATIENT)
Dept: OPERATING ROOM | Age: 40
End: 2020-05-27
Payer: COMMERCIAL

## 2020-05-27 VITALS
WEIGHT: 175 LBS | HEART RATE: 74 BPM | OXYGEN SATURATION: 98 % | SYSTOLIC BLOOD PRESSURE: 128 MMHG | HEIGHT: 72 IN | TEMPERATURE: 97.3 F | RESPIRATION RATE: 16 BRPM | DIASTOLIC BLOOD PRESSURE: 75 MMHG | BODY MASS INDEX: 23.7 KG/M2

## 2020-05-27 VITALS
TEMPERATURE: 97.6 F | OXYGEN SATURATION: 100 % | DIASTOLIC BLOOD PRESSURE: 66 MMHG | SYSTOLIC BLOOD PRESSURE: 90 MMHG | RESPIRATION RATE: 24 BRPM

## 2020-05-27 LAB
GLUCOSE BLD-MCNC: 181 MG/DL (ref 70–99)
GLUCOSE BLD-MCNC: 210 MG/DL (ref 70–99)

## 2020-05-27 PROCEDURE — 3700000000 HC ANESTHESIA ATTENDED CARE: Performed by: SURGERY

## 2020-05-27 PROCEDURE — 2500000003 HC RX 250 WO HCPCS: Performed by: SURGERY

## 2020-05-27 PROCEDURE — C1781 MESH (IMPLANTABLE): HCPCS | Performed by: SURGERY

## 2020-05-27 PROCEDURE — 2709999900 HC NON-CHARGEABLE SUPPLY: Performed by: SURGERY

## 2020-05-27 PROCEDURE — 2580000003 HC RX 258: Performed by: NURSE ANESTHETIST, CERTIFIED REGISTERED

## 2020-05-27 PROCEDURE — 7100000000 HC PACU RECOVERY - FIRST 15 MIN: Performed by: SURGERY

## 2020-05-27 PROCEDURE — 6370000000 HC RX 637 (ALT 250 FOR IP): Performed by: ANESTHESIOLOGY

## 2020-05-27 PROCEDURE — 3700000001 HC ADD 15 MINUTES (ANESTHESIA): Performed by: SURGERY

## 2020-05-27 PROCEDURE — 6360000002 HC RX W HCPCS: Performed by: NURSE ANESTHETIST, CERTIFIED REGISTERED

## 2020-05-27 PROCEDURE — 49505 PRP I/HERN INIT REDUC >5 YR: CPT | Performed by: SURGERY

## 2020-05-27 PROCEDURE — 7100000010 HC PHASE II RECOVERY - FIRST 15 MIN: Performed by: SURGERY

## 2020-05-27 PROCEDURE — 7100000001 HC PACU RECOVERY - ADDTL 15 MIN: Performed by: SURGERY

## 2020-05-27 PROCEDURE — 6360000002 HC RX W HCPCS: Performed by: ANESTHESIOLOGY

## 2020-05-27 PROCEDURE — 3600000003 HC SURGERY LEVEL 3 BASE: Performed by: SURGERY

## 2020-05-27 PROCEDURE — 7100000011 HC PHASE II RECOVERY - ADDTL 15 MIN: Performed by: SURGERY

## 2020-05-27 PROCEDURE — 3600000013 HC SURGERY LEVEL 3 ADDTL 15MIN: Performed by: SURGERY

## 2020-05-27 PROCEDURE — 2500000003 HC RX 250 WO HCPCS: Performed by: NURSE ANESTHETIST, CERTIFIED REGISTERED

## 2020-05-27 PROCEDURE — 88302 TISSUE EXAM BY PATHOLOGIST: CPT

## 2020-05-27 PROCEDURE — 82962 GLUCOSE BLOOD TEST: CPT

## 2020-05-27 PROCEDURE — 2580000003 HC RX 258: Performed by: ANESTHESIOLOGY

## 2020-05-27 PROCEDURE — 2720000010 HC SURG SUPPLY STERILE: Performed by: SURGERY

## 2020-05-27 PROCEDURE — 6370000000 HC RX 637 (ALT 250 FOR IP): Performed by: NURSE ANESTHETIST, CERTIFIED REGISTERED

## 2020-05-27 PROCEDURE — 6360000002 HC RX W HCPCS: Performed by: SURGERY

## 2020-05-27 DEVICE — PLUG HERN L W1.6XL1.9IN INGUINAL POLYPR REP PRESHAPED ONLAY: Type: IMPLANTABLE DEVICE | Site: INGUINAL | Status: FUNCTIONAL

## 2020-05-27 RX ORDER — FENTANYL CITRATE 50 UG/ML
INJECTION, SOLUTION INTRAMUSCULAR; INTRAVENOUS PRN
Status: DISCONTINUED | OUTPATIENT
Start: 2020-05-27 | End: 2020-05-27 | Stop reason: SDUPTHER

## 2020-05-27 RX ORDER — HYDRALAZINE HYDROCHLORIDE 20 MG/ML
5 INJECTION INTRAMUSCULAR; INTRAVENOUS EVERY 10 MIN PRN
Status: DISCONTINUED | OUTPATIENT
Start: 2020-05-27 | End: 2020-05-27 | Stop reason: HOSPADM

## 2020-05-27 RX ORDER — HYDROMORPHONE HCL 110MG/55ML
0.5 PATIENT CONTROLLED ANALGESIA SYRINGE INTRAVENOUS EVERY 5 MIN PRN
Status: DISCONTINUED | OUTPATIENT
Start: 2020-05-27 | End: 2020-05-27 | Stop reason: HOSPADM

## 2020-05-27 RX ORDER — SODIUM CHLORIDE, SODIUM LACTATE, POTASSIUM CHLORIDE, CALCIUM CHLORIDE 600; 310; 30; 20 MG/100ML; MG/100ML; MG/100ML; MG/100ML
INJECTION, SOLUTION INTRAVENOUS CONTINUOUS
Status: DISCONTINUED | OUTPATIENT
Start: 2020-05-27 | End: 2020-05-27 | Stop reason: HOSPADM

## 2020-05-27 RX ORDER — AMOXICILLIN 250 MG
2 CAPSULE ORAL DAILY
Qty: 28 TABLET | Refills: 0 | Status: SHIPPED | OUTPATIENT
Start: 2020-05-27 | End: 2020-06-10

## 2020-05-27 RX ORDER — ROCURONIUM BROMIDE 10 MG/ML
INJECTION, SOLUTION INTRAVENOUS PRN
Status: DISCONTINUED | OUTPATIENT
Start: 2020-05-27 | End: 2020-05-27 | Stop reason: SDUPTHER

## 2020-05-27 RX ORDER — LIDOCAINE HYDROCHLORIDE 40 MG/ML
SOLUTION TOPICAL PRN
Status: DISCONTINUED | OUTPATIENT
Start: 2020-05-27 | End: 2020-05-27 | Stop reason: SDUPTHER

## 2020-05-27 RX ORDER — MIDAZOLAM HYDROCHLORIDE 1 MG/ML
INJECTION INTRAMUSCULAR; INTRAVENOUS PRN
Status: DISCONTINUED | OUTPATIENT
Start: 2020-05-27 | End: 2020-05-27 | Stop reason: SDUPTHER

## 2020-05-27 RX ORDER — ONDANSETRON 2 MG/ML
4 INJECTION INTRAMUSCULAR; INTRAVENOUS
Status: DISCONTINUED | OUTPATIENT
Start: 2020-05-27 | End: 2020-05-27 | Stop reason: HOSPADM

## 2020-05-27 RX ORDER — LIDOCAINE HYDROCHLORIDE 20 MG/ML
INJECTION, SOLUTION INFILTRATION; PERINEURAL PRN
Status: DISCONTINUED | OUTPATIENT
Start: 2020-05-27 | End: 2020-05-27 | Stop reason: SDUPTHER

## 2020-05-27 RX ORDER — CEFAZOLIN SODIUM 2 G/100ML
2 INJECTION, SOLUTION INTRAVENOUS ONCE
Status: COMPLETED | OUTPATIENT
Start: 2020-05-27 | End: 2020-05-27

## 2020-05-27 RX ORDER — ONDANSETRON 2 MG/ML
INJECTION INTRAMUSCULAR; INTRAVENOUS PRN
Status: DISCONTINUED | OUTPATIENT
Start: 2020-05-27 | End: 2020-05-27 | Stop reason: SDUPTHER

## 2020-05-27 RX ORDER — ONDANSETRON 4 MG/1
4 TABLET, FILM COATED ORAL DAILY PRN
Qty: 20 TABLET | Refills: 0 | Status: SHIPPED | OUTPATIENT
Start: 2020-05-27 | End: 2020-07-23

## 2020-05-27 RX ORDER — FENTANYL CITRATE 50 UG/ML
25 INJECTION, SOLUTION INTRAMUSCULAR; INTRAVENOUS EVERY 5 MIN PRN
Status: DISCONTINUED | OUTPATIENT
Start: 2020-05-27 | End: 2020-05-27 | Stop reason: HOSPADM

## 2020-05-27 RX ORDER — PROPOFOL 10 MG/ML
INJECTION, EMULSION INTRAVENOUS PRN
Status: DISCONTINUED | OUTPATIENT
Start: 2020-05-27 | End: 2020-05-27 | Stop reason: SDUPTHER

## 2020-05-27 RX ORDER — HYDROCODONE BITARTRATE AND ACETAMINOPHEN 5; 325 MG/1; MG/1
1 TABLET ORAL EVERY 6 HOURS PRN
Qty: 12 TABLET | Refills: 0 | Status: SHIPPED | OUTPATIENT
Start: 2020-05-27 | End: 2020-05-30

## 2020-05-27 RX ORDER — LABETALOL HYDROCHLORIDE 5 MG/ML
5 INJECTION, SOLUTION INTRAVENOUS EVERY 10 MIN PRN
Status: DISCONTINUED | OUTPATIENT
Start: 2020-05-27 | End: 2020-05-27 | Stop reason: HOSPADM

## 2020-05-27 RX ORDER — BUPIVACAINE HYDROCHLORIDE 5 MG/ML
INJECTION, SOLUTION EPIDURAL; INTRACAUDAL
Status: COMPLETED | OUTPATIENT
Start: 2020-05-27 | End: 2020-05-27

## 2020-05-27 RX ORDER — HYDROCODONE BITARTRATE AND ACETAMINOPHEN 5; 325 MG/1; MG/1
1 TABLET ORAL ONCE
Status: COMPLETED | OUTPATIENT
Start: 2020-05-27 | End: 2020-05-27

## 2020-05-27 RX ADMIN — SODIUM CHLORIDE, POTASSIUM CHLORIDE, SODIUM LACTATE AND CALCIUM CHLORIDE: 600; 310; 30; 20 INJECTION, SOLUTION INTRAVENOUS at 07:01

## 2020-05-27 RX ADMIN — DEXMEDETOMIDINE 8 MCG: 100 INJECTION, SOLUTION, CONCENTRATE INTRAVENOUS at 07:35

## 2020-05-27 RX ADMIN — ROCURONIUM BROMIDE 50 MG: 10 INJECTION INTRAVENOUS at 07:48

## 2020-05-27 RX ADMIN — ONDANSETRON 4 MG: 2 INJECTION INTRAMUSCULAR; INTRAVENOUS at 09:41

## 2020-05-27 RX ADMIN — MIDAZOLAM 2 MG: 1 INJECTION INTRAMUSCULAR; INTRAVENOUS at 07:36

## 2020-05-27 RX ADMIN — DEXMEDETOMIDINE 8 MCG: 100 INJECTION, SOLUTION, CONCENTRATE INTRAVENOUS at 07:54

## 2020-05-27 RX ADMIN — LIDOCAINE HYDROCHLORIDE 2 ML: 20 INJECTION, SOLUTION INFILTRATION; PERINEURAL at 07:48

## 2020-05-27 RX ADMIN — SODIUM CHLORIDE, POTASSIUM CHLORIDE, SODIUM LACTATE AND CALCIUM CHLORIDE: 600; 310; 30; 20 INJECTION, SOLUTION INTRAVENOUS at 07:42

## 2020-05-27 RX ADMIN — LIDOCAINE HYDROCHLORIDE 4 ML: 40 SOLUTION TOPICAL at 07:50

## 2020-05-27 RX ADMIN — SUGAMMADEX 100 MG: 100 INJECTION, SOLUTION INTRAVENOUS at 09:42

## 2020-05-27 RX ADMIN — ROCURONIUM BROMIDE 25 MG: 10 INJECTION INTRAVENOUS at 08:21

## 2020-05-27 RX ADMIN — DEXMEDETOMIDINE 8 MCG: 100 INJECTION, SOLUTION, CONCENTRATE INTRAVENOUS at 07:50

## 2020-05-27 RX ADMIN — DEXMEDETOMIDINE 8 MCG: 100 INJECTION, SOLUTION, CONCENTRATE INTRAVENOUS at 07:42

## 2020-05-27 RX ADMIN — SUGAMMADEX 100 MG: 100 INJECTION, SOLUTION INTRAVENOUS at 09:39

## 2020-05-27 RX ADMIN — FENTANYL CITRATE 25 MCG: 50 INJECTION, SOLUTION INTRAMUSCULAR; INTRAVENOUS at 10:18

## 2020-05-27 RX ADMIN — HYDROCODONE BITARTRATE AND ACETAMINOPHEN 1 TABLET: 5; 325 TABLET ORAL at 12:00

## 2020-05-27 RX ADMIN — CEFAZOLIN SODIUM 2 G: 2 INJECTION, SOLUTION INTRAVENOUS at 07:42

## 2020-05-27 RX ADMIN — PHENYLEPHRINE HYDROCHLORIDE 150 MCG: 10 INJECTION INTRAVENOUS at 08:34

## 2020-05-27 RX ADMIN — PROPOFOL 150 MG: 10 INJECTION, EMULSION INTRAVENOUS at 07:48

## 2020-05-27 RX ADMIN — DEXMEDETOMIDINE 8 MCG: 100 INJECTION, SOLUTION, CONCENTRATE INTRAVENOUS at 07:38

## 2020-05-27 RX ADMIN — FENTANYL CITRATE 100 MCG: 50 INJECTION INTRAMUSCULAR; INTRAVENOUS at 07:48

## 2020-05-27 ASSESSMENT — PULMONARY FUNCTION TESTS
PIF_VALUE: 18
PIF_VALUE: 19
PIF_VALUE: 15
PIF_VALUE: 19
PIF_VALUE: 18
PIF_VALUE: 18
PIF_VALUE: 19
PIF_VALUE: 17
PIF_VALUE: 15
PIF_VALUE: 18
PIF_VALUE: 18
PIF_VALUE: 19
PIF_VALUE: 1
PIF_VALUE: 18
PIF_VALUE: 19
PIF_VALUE: 18
PIF_VALUE: 19
PIF_VALUE: 18
PIF_VALUE: 17
PIF_VALUE: 19
PIF_VALUE: 19
PIF_VALUE: 18
PIF_VALUE: 18
PIF_VALUE: 2
PIF_VALUE: 18
PIF_VALUE: 14
PIF_VALUE: 7
PIF_VALUE: 1
PIF_VALUE: 19
PIF_VALUE: 18
PIF_VALUE: 19
PIF_VALUE: 18
PIF_VALUE: 19
PIF_VALUE: 14
PIF_VALUE: 19
PIF_VALUE: 18
PIF_VALUE: 15
PIF_VALUE: 19
PIF_VALUE: 19
PIF_VALUE: 18
PIF_VALUE: 19
PIF_VALUE: 19
PIF_VALUE: 18
PIF_VALUE: 19
PIF_VALUE: 14
PIF_VALUE: 7
PIF_VALUE: 19
PIF_VALUE: 18
PIF_VALUE: 18
PIF_VALUE: 19
PIF_VALUE: 19
PIF_VALUE: 18
PIF_VALUE: 19
PIF_VALUE: 17
PIF_VALUE: 18
PIF_VALUE: 1
PIF_VALUE: 18
PIF_VALUE: 1
PIF_VALUE: 18
PIF_VALUE: 19
PIF_VALUE: 17
PIF_VALUE: 0
PIF_VALUE: 17
PIF_VALUE: 17
PIF_VALUE: 1
PIF_VALUE: 19
PIF_VALUE: 14
PIF_VALUE: 17
PIF_VALUE: 18
PIF_VALUE: 20
PIF_VALUE: 18
PIF_VALUE: 19
PIF_VALUE: 17
PIF_VALUE: 18
PIF_VALUE: 17
PIF_VALUE: 17
PIF_VALUE: 18
PIF_VALUE: 18
PIF_VALUE: 19
PIF_VALUE: 18
PIF_VALUE: 19
PIF_VALUE: 18
PIF_VALUE: 14
PIF_VALUE: 14
PIF_VALUE: 18
PIF_VALUE: 19
PIF_VALUE: 18
PIF_VALUE: 7
PIF_VALUE: 19
PIF_VALUE: 18
PIF_VALUE: 15
PIF_VALUE: 18
PIF_VALUE: 19
PIF_VALUE: 19
PIF_VALUE: 1
PIF_VALUE: 17
PIF_VALUE: 18
PIF_VALUE: 1
PIF_VALUE: 23
PIF_VALUE: 18
PIF_VALUE: 18
PIF_VALUE: 17
PIF_VALUE: 17
PIF_VALUE: 18
PIF_VALUE: 19
PIF_VALUE: 18
PIF_VALUE: 17
PIF_VALUE: 18
PIF_VALUE: 14
PIF_VALUE: 18
PIF_VALUE: 17

## 2020-05-27 ASSESSMENT — PAIN - FUNCTIONAL ASSESSMENT: PAIN_FUNCTIONAL_ASSESSMENT: 0-10

## 2020-05-27 ASSESSMENT — PAIN DESCRIPTION - ORIENTATION: ORIENTATION: RIGHT;LEFT

## 2020-05-27 ASSESSMENT — PAIN SCALES - GENERAL
PAINLEVEL_OUTOF10: 10
PAINLEVEL_OUTOF10: 6
PAINLEVEL_OUTOF10: 10
PAINLEVEL_OUTOF10: 10

## 2020-05-27 ASSESSMENT — PAIN DESCRIPTION - FREQUENCY: FREQUENCY: CONTINUOUS

## 2020-05-27 ASSESSMENT — ENCOUNTER SYMPTOMS
ALLERGIC/IMMUNOLOGIC NEGATIVE: 1
EYES NEGATIVE: 1
RESPIRATORY NEGATIVE: 1

## 2020-05-27 ASSESSMENT — PAIN DESCRIPTION - PAIN TYPE: TYPE: SURGICAL PAIN

## 2020-05-27 ASSESSMENT — PAIN DESCRIPTION - LOCATION: LOCATION: ABDOMEN

## 2020-05-27 ASSESSMENT — PAIN DESCRIPTION - DESCRIPTORS: DESCRIPTORS: ACHING;DISCOMFORT

## 2020-05-27 NOTE — PROGRESS NOTES
Assisted up to bathroom, pt voided. Assisted back to room. Discharge instructions given to pt, voiced understanding.   Called Jose Gonsalez from transport home

## 2020-05-27 NOTE — H&P
History and Physical              Subjective:     Patient is a 44 y.o.  male scheduled for elective b/l inguinal hernia repairs. Indications for procedure are increasing and symptomatic groin hernias.   Pt was seen in clinic on 3/17/2020 and reports the following changes in health since that time:    None    Discussed Blood/Blood Products with patient and/or family: yes    Patient Active Problem List    Diagnosis Date Noted    Diabetic foot ulcer ([facility] [address] 05/13/2020    Gastroesophageal reflux disease without esophagitis 05/11/2020    Type 2 diabetes mellitus with diabetic neuropathy, without long-term current use of insulin ([address]     Essential hypertension     Lumbar radiculopathy     S/P lumbar laminectomy 04/18/2018    Lumbar back pain with radiculopathy affecting left lower extremity 04/17/2018    Unstable angina ([address] 03/13/2018       Past Medical History:   Diagnosis Date    Callus of foot     Right foot - see's Dr. Caroline Santiago Diabetes mellitus ([facility] [address]     type II - follows with PCP    Dizziness     positional    Essential hypertension     Follows with PCP    GERD (gastroesophageal reflux disease)     Takes omeprazole    Lumbar radiculopathy         Past Surgical History:   Procedure Laterality Date    [facility]    DENTAL SURGERY      teeth extractions -half per patient    LUMBAR LAMINECTOMY  2018    [place] OFFICE/OUTPT VISIT,PROCEDURE ONLY Left 4/17/2018    L5-S1 HEMILAMINECTOMY, REMOVAL OF DISC LEFT SIDE performed by Romain Bryson MD at [address] [facility]          Medications Prior to Admission: metFORMIN (GLUCOPHAGE) 500 MG tablet, Take 2 tablets by mouth 2 times daily (with meals)  lisinopril-hydroCHLOROthiazide (PRINZIDE;ZESTORETIC) 20-12.5 MG per tablet, TAKE 1 TABLET BY MOUTH ONCE DAILY  omeprazole (PRILOSEC) 20 MG delayed release capsule, TAKE 1 CAPSULE BY MOUTH ONCE DAILY IN THE MORNING  glyBURIDE (DIABETA) 5 MG tablet, Take 1 tablet by mouth 2 times daily (with meals)  aspirin 81 MG tablet, Take 81 mg by mouth daily  acetaminophen (TYLENOL) 325 MG tablet, Take 2 tablets by mouth every 4 hours as needed for Pain or Fever    No Known Allergies     Social History     Tobacco Use    Smoking status: Never Smoker    Smokeless tobacco: Never Used   Substance Use Topics    Alcohol use: Yes     Comment: \"couple beers a night\"        Family History   Problem Relation Age of Onset    Heart Disease Father     Diabetes Father     Diabetes Mother         Review of Systems  Review of Systems   Constitutional: Negative. HENT: Negative. Eyes: Negative. Respiratory: Negative. Cardiovascular: Negative. Gastrointestinal:        Groin pain     Endocrine: Negative. Genitourinary: Negative. Musculoskeletal: Negative. Skin: Negative. Allergic/Immunologic: Negative. Neurological: Negative. Hematological: Negative. Psychiatric/Behavioral: Negative. Objective:     Patient Vitals for the past 8 hrs:   BP Temp Temp src Pulse Resp SpO2   05/27/20 0625 138/89 -- Temporal 98 18 99 %   05/27/20 0624 -- 98.4 °F (36.9 °C) -- -- -- --     Physical Exam  Vitals signs reviewed. Constitutional:       General: He is not in acute distress. Appearance: Normal appearance. He is not ill-appearing. HENT:      Head: Normocephalic and atraumatic. Mouth/Throat:      Mouth: Mucous membranes are dry. Eyes:      General:         Right eye: No discharge. Left eye: No discharge. Extraocular Movements: Extraocular movements intact. Pupils: Pupils are equal, round, and reactive to light. Neck:      Musculoskeletal: Normal range of motion. Cardiovascular:      Rate and Rhythm: Normal rate. Pulses: Normal pulses. Pulmonary:      Effort: Pulmonary effort is normal.   Abdominal:      Palpations: Abdomen is soft. Hernia: A hernia (b/l groin) is present.    Musculoskeletal: Normal range

## 2020-05-27 NOTE — ANESTHESIA PRE PROCEDURE
Department of Anesthesiology  Preprocedure Note       Name:  Link Pod   Age:  44 y.o.  :  1980                                          MRN:  [id number]         Date:  2020      Surgeon: Lisa Latif):  Arya Lee MD    Procedure: Procedure(s):  BIALTERAL HERNIA INGUINAL REPAIR    Medications prior to admission:   Prior to Admission medications    Medication Sig Start Date End Date Taking?  Authorizing Provider   metFORMIN (GLUCOPHAGE) 500 MG tablet Take 2 tablets by mouth 2 times daily (with meals) 20  Yes Mary Guardado MD   lisinopril-hydroCHLOROthiazide [facility]) 20-12.5 MG per tablet TAKE 1 TABLET BY MOUTH ONCE DAILY 20  Yes Mary Guardado MD   omeprazole (PRILOSEC) 20 MG delayed release capsule TAKE 1 CAPSULE BY MOUTH ONCE DAILY IN THE MORNING 20  Yes Mary Guardado MD   glyBURIDE (DIABETA) 5 MG tablet Take 1 tablet by mouth 2 times daily (with meals) 20 Yes Mary Guardado MD   aspirin 81 MG tablet Take 81 mg by mouth daily   Yes Historical Provider, MD   acetaminophen (TYLENOL) 325 MG tablet Take 2 tablets by mouth every 4 hours as needed for Pain or Fever 18  Yes Casey Levine MD       Current medications:    Current Facility-Administered Medications   Medication Dose Route Frequency Provider Last Rate Last Dose    lactated ringers infusion   Intravenous Continuous Marleni Mccormick MD 50 mL/hr at 20 0701       Facility-Administered Medications Ordered in Other Encounters   Medication Dose Route Frequency Provider Last Rate Last Dose    lidocaine (XYLOCAINE) 4 % solution    PRN Herrera Alexander, APRN - CRNA   4 mL at 20 0750    dexmedetomidine (PRECEDEX) 200 mcg in sodium chloride 0.9 % 50 mL IV bolus    Continuous PRN Herrera Alexander, APRN - CRNA   8 mcg at 20 0754    midazolam (VERSED) injection    PRN Herrera Alexander, APRN - CRNA   2 mg at 20 0736    fentaNYL (SUBLIMAZE) Component Value Date    COVID19 NOT DETECTED 05/23/2020         Anesthesia Evaluation  Patient summary reviewed  Airway: Mallampati: II  TM distance: <3 FB   Neck ROM: full  Mouth opening: > = 3 FB Dental:          Pulmonary:             Patient did not smoke on day of surgery. Cardiovascular:  Exercise tolerance: good (>4 METS),   (+) hypertension:, angina: no interval change,       ECG reviewed      Echocardiogram reviewed  Stress test reviewed       Beta Blocker:  Not on Beta Blocker      ROS comment: Sinus  Tachycardia   WITHIN NORMAL LIMITS  5/13/2020  FINDINGS:  1. Left main:  0% stenosis. 2.  LAD:  Tortuous, large, 0% stenosis. 3.  Diagonal:  Small, 0% stenosis. 4.  Left circumflex:  Large, 0% stenosis. 5.  Obtuse marginal 1 and 2:  Medium size, 0% stenosis. 6.  Right coronary artery:  Dominant, 0% stenosis. 7.  LV angiogram shows LV ejection fraction more than 55%, LVEDP of 5 mmHg.     SUMMARY:  1. Patent coronaries. 2.  Preserved left ventricular systolic function.     RECOMMENDATION:  Medical management.           Bibiana Blum MD     D: 02/28/2018 15:40:31       T: 02/28/2018 20:05:39     [id number]  Job#: [id number]     Doc#: [id number]     CC:Summary   No EKG changes suggestive of ischemia with Lexiscan infusion.   Normal perfusion uptake noted   no reversible ischemia noted   gating shows EF 48%      Signatures      ------------------------------------------------------------------   Electronically signed by Tiffanie Harris MD (Interpreting   cardiologist) on 02/23/2018 at 17:35       Summary   Left ventricular systolic function is mildly depressed. Ejection fraction is visually estimated at 45-50%. Grade I diastolic dysfunction. Mild concentric left ventricular hypertrophy. No evidence of any pericardial effusion.       Signature      ------------------------------------------------------------------   Electronically signed by Nickie Geiger MD   (Interpreting

## 2020-06-10 ENCOUNTER — TELEPHONE (OUTPATIENT)
Dept: BARIATRICS/WEIGHT MGMT | Age: 40
End: 2020-06-10

## 2020-06-10 RX ORDER — PROMETHAZINE HYDROCHLORIDE 12.5 MG/1
12.5 TABLET ORAL EVERY 6 HOURS PRN
Qty: 15 TABLET | Refills: 0 | Status: SHIPPED | OUTPATIENT
Start: 2020-06-10 | End: 2020-06-17

## 2020-06-11 ENCOUNTER — OFFICE VISIT (OUTPATIENT)
Dept: BARIATRICS/WEIGHT MGMT | Age: 40
End: 2020-06-11

## 2020-06-11 VITALS
SYSTOLIC BLOOD PRESSURE: 118 MMHG | OXYGEN SATURATION: 98 % | DIASTOLIC BLOOD PRESSURE: 64 MMHG | WEIGHT: 182.2 LBS | RESPIRATION RATE: 16 BRPM | HEIGHT: 72 IN | HEART RATE: 120 BPM | TEMPERATURE: 99.9 F | BODY MASS INDEX: 24.68 KG/M2

## 2020-06-11 PROCEDURE — 99024 POSTOP FOLLOW-UP VISIT: CPT | Performed by: SURGERY

## 2020-06-11 NOTE — PROGRESS NOTES
Post-Operative Clinic Note    Chief Complaint   Patient presents with    Post-Op Check     AISLINN Ing Hernia repair @ [facility] 05/27/20         SUBJECTIVE:  Patient is here today for a post-operative visit. Patient is s/p open b/l IHR with mesh on 5/27/2020. Was doing fine until 2 days ago and had some N/V. Improved now. Denies incisional pain. +BM. Otherwise without complaints. Has been walking. Denies discharge or drainage from incision. Denies loss of sensation, denies scrotal issues.          Past Surgical History:   Procedure Laterality Date    C[facility]AC [facility]  03/2018    [facility]    DENTAL SURGERY      teeth extractions -half per patient    HERNIA REPAIR Bilateral 5/27/2020    BIALTERAL HERNIA INGUINAL REPAIR performed by Daija Oliveira MD at [address] [place] [address]  2018    [place] OFFICE/OUTPT VISIT,PROCEDURE ONLY Left 4/17/2018    L5-S1 HEMILAMINECTOMY, REMOVAL OF DISC LEFT SIDE performed by Cindy Wiley MD at [address] [facility]       Past Medical History:   Diagnosis Date    Callus of foot     Right foot - see's Dr. Shawn Jones Diabetes mellitus ([facility]     type II - follows with PCP    Dizziness     positional    Essential hypertension     Follows with PCP    GERD (gastroesophageal reflux disease)     Takes omeprazole    Lumbar radiculopathy      Family History   Problem Relation Age of Onset    Heart Disease Father     Diabetes Father     Diabetes Mother      Social History     Socioeconomic History    Marital status: Single     Spouse name: Not on file    Number of children: Not on file    Years of education: Not on file    Highest education level: Not on file   Occupational History    Not on file   Social Needs    Financial resource strain: Not on file    Food insecurity     Worry: Not on file     Inability: Not on file    Transportation needs     Medical: Not on file     Non-medical: Not on file   Tobacco Use    Smoking status: Never

## 2020-06-13 ENCOUNTER — APPOINTMENT (OUTPATIENT)
Dept: CT IMAGING | Age: 40
DRG: 139 | End: 2020-06-13
Payer: COMMERCIAL

## 2020-06-13 ENCOUNTER — APPOINTMENT (OUTPATIENT)
Dept: GENERAL RADIOLOGY | Age: 40
DRG: 139 | End: 2020-06-13
Payer: COMMERCIAL

## 2020-06-13 ENCOUNTER — HOSPITAL ENCOUNTER (INPATIENT)
Age: 40
LOS: 2 days | Discharge: HOME | DRG: 139 | End: 2020-06-15
Attending: INTERNAL MEDICINE | Admitting: INTERNAL MEDICINE
Payer: COMMERCIAL

## 2020-06-13 PROBLEM — I76 SEPTIC EMBOLISM (HCC): Status: ACTIVE | Noted: 2020-06-13

## 2020-06-13 LAB
ADENOVIRUS DETECTION BY PCR: NOT DETECTED
ALBUMIN SERPL-MCNC: 3.7 GM/DL (ref 3.4–5)
ALP BLD-CCNC: 36 IU/L (ref 40–129)
ALT SERPL-CCNC: 29 U/L (ref 10–40)
AMPHETAMINES: NEGATIVE
ANION GAP SERPL CALCULATED.3IONS-SCNC: 11 MMOL/L (ref 4–16)
APTT: 29.4 SECONDS (ref 25.1–37.1)
AST SERPL-CCNC: 21 IU/L (ref 15–37)
BACTERIA: NEGATIVE /HPF
BARBITURATE SCREEN URINE: NEGATIVE
BASOPHILS ABSOLUTE: 0 K/CU MM
BASOPHILS RELATIVE PERCENT: 0.3 % (ref 0–1)
BENZODIAZEPINE SCREEN, URINE: NEGATIVE
BILIRUB SERPL-MCNC: 0.8 MG/DL (ref 0–1)
BILIRUBIN URINE: NEGATIVE MG/DL
BLOOD, URINE: NEGATIVE
BORDETELLA PARAPERTUSSIS BY PCR: NOT DETECTED
BORDETELLA PERTUSSIS PCR: NOT DETECTED
BUN BLDV-MCNC: 8 MG/DL (ref 6–23)
CALCIUM SERPL-MCNC: 8.8 MG/DL (ref 8.3–10.6)
CANNABINOID SCREEN URINE: NEGATIVE
CHLAMYDOPHILA PNEUMONIA PCR: NOT DETECTED
CHLORIDE BLD-SCNC: 93 MMOL/L (ref 99–110)
CLARITY: CLEAR
CO2: 29 MMOL/L (ref 21–32)
COCAINE METABOLITE: NEGATIVE
COLOR: YELLOW
CORONAVIRUS 229E PCR: NOT DETECTED
CORONAVIRUS HKU1 PCR: NOT DETECTED
CORONAVIRUS NL63 PCR: NOT DETECTED
CORONAVIRUS OC43 PCR: NOT DETECTED
CREAT SERPL-MCNC: 0.7 MG/DL (ref 0.9–1.3)
D DIMER: 571 NG/ML(DDU)
DIFFERENTIAL TYPE: ABNORMAL
EOSINOPHILS ABSOLUTE: 0.1 K/CU MM
EOSINOPHILS RELATIVE PERCENT: 0.7 % (ref 0–3)
FERRITIN: 1321 NG/ML (ref 30–400)
FIBRINOGEN LEVEL: 540 MG/DL (ref 196.9–442.1)
GFR AFRICAN AMERICAN: >60 ML/MIN/1.73M2
GFR NON-AFRICAN AMERICAN: >60 ML/MIN/1.73M2
GLUCOSE BLD-MCNC: 187 MG/DL (ref 70–99)
GLUCOSE BLD-MCNC: 222 MG/DL (ref 70–99)
GLUCOSE BLD-MCNC: 227 MG/DL (ref 70–99)
GLUCOSE BLD-MCNC: 305 MG/DL (ref 70–99)
GLUCOSE, URINE: 50 MG/DL
HCT VFR BLD CALC: 39.2 % (ref 42–52)
HEMOGLOBIN: 13.2 GM/DL (ref 13.5–18)
HIGH SENSITIVE C-REACTIVE PROTEIN: 83.8 MG/L
HUMAN METAPNEUMOVIRUS PCR: NOT DETECTED
IMMATURE NEUTROPHIL %: 0.3 % (ref 0–0.43)
INFLUENZA A BY PCR: NOT DETECTED
INFLUENZA A H1 (2009) PCR: NOT DETECTED
INFLUENZA A H1 PANDEMIC PCR: NOT DETECTED
INFLUENZA A H3 PCR: NOT DETECTED
INFLUENZA B BY PCR: NOT DETECTED
INR BLD: 1.01 INDEX
KETONES, URINE: NEGATIVE MG/DL
LACTATE DEHYDROGENASE: 192 IU/L (ref 120–246)
LEGIONELLA URINARY AG: NEGATIVE
LEUKOCYTE ESTERASE, URINE: NEGATIVE
LIPASE: 24 IU/L (ref 13–60)
LYMPHOCYTES ABSOLUTE: 1.7 K/CU MM
LYMPHOCYTES RELATIVE PERCENT: 22.2 % (ref 24–44)
MCH RBC QN AUTO: 31.4 PG (ref 27–31)
MCHC RBC AUTO-ENTMCNC: 33.7 % (ref 32–36)
MCV RBC AUTO: 93.3 FL (ref 78–100)
MONOCYTES ABSOLUTE: 0.8 K/CU MM
MONOCYTES RELATIVE PERCENT: 11.1 % (ref 0–4)
MYCOPLASMA PNEUMONIAE PCR: NOT DETECTED
NITRITE URINE, QUANTITATIVE: NEGATIVE
NUCLEATED RBC %: 0 %
OPIATES, URINE: NEGATIVE
OXYCODONE: NEGATIVE
PARAINFLUENZA 1 PCR: NOT DETECTED
PARAINFLUENZA 2 PCR: NOT DETECTED
PARAINFLUENZA 3 PCR: NOT DETECTED
PARAINFLUENZA 4 PCR: NOT DETECTED
PDW BLD-RTO: 11.4 % (ref 11.7–14.9)
PH, URINE: 6 (ref 5–8)
PHENCYCLIDINE, URINE: NEGATIVE
PLATELET # BLD: 272 K/CU MM (ref 140–440)
PMV BLD AUTO: 9.5 FL (ref 7.5–11.1)
POTASSIUM SERPL-SCNC: 3.7 MMOL/L (ref 3.5–5.1)
PROCALCITONIN: 0.16
PROTEIN UA: NEGATIVE MG/DL
PROTHROMBIN TIME: 12.2 SECONDS (ref 11.7–14.5)
RBC # BLD: 4.2 M/CU MM (ref 4.6–6.2)
RBC URINE: 2 /HPF (ref 0–3)
RHINOVIRUS ENTEROVIRUS PCR: NOT DETECTED
RSV PCR: NOT DETECTED
SEGMENTED NEUTROPHILS ABSOLUTE COUNT: 4.9 K/CU MM
SEGMENTED NEUTROPHILS RELATIVE PERCENT: 65.4 % (ref 36–66)
SODIUM BLD-SCNC: 133 MMOL/L (ref 135–145)
SPECIFIC GRAVITY UA: >1.06 (ref 1–1.03)
SQUAMOUS EPITHELIAL: <1 /HPF
STREP PNEUMONIAE ANTIGEN: NORMAL
TOTAL IMMATURE NEUTOROPHIL: 0.02 K/CU MM
TOTAL NUCLEATED RBC: 0 K/CU MM
TOTAL PROTEIN: 7.7 GM/DL (ref 6.4–8.2)
TRICHOMONAS: ABNORMAL /HPF
TROPONIN T: <0.01 NG/ML
UROBILINOGEN, URINE: 1 MG/DL (ref 0.2–1)
WBC # BLD: 7.5 K/CU MM (ref 4–10.5)
WBC UA: ABNORMAL /HPF (ref 0–2)

## 2020-06-13 PROCEDURE — 87899 AGENT NOS ASSAY W/OPTIC: CPT

## 2020-06-13 PROCEDURE — 96375 TX/PRO/DX INJ NEW DRUG ADDON: CPT

## 2020-06-13 PROCEDURE — 87040 BLOOD CULTURE FOR BACTERIA: CPT

## 2020-06-13 PROCEDURE — 85730 THROMBOPLASTIN TIME PARTIAL: CPT

## 2020-06-13 PROCEDURE — 6370000000 HC RX 637 (ALT 250 FOR IP): Performed by: INTERNAL MEDICINE

## 2020-06-13 PROCEDURE — 83690 ASSAY OF LIPASE: CPT

## 2020-06-13 PROCEDURE — U0002 COVID-19 LAB TEST NON-CDC: HCPCS

## 2020-06-13 PROCEDURE — 96361 HYDRATE IV INFUSION ADD-ON: CPT

## 2020-06-13 PROCEDURE — 71045 X-RAY EXAM CHEST 1 VIEW: CPT

## 2020-06-13 PROCEDURE — 6370000000 HC RX 637 (ALT 250 FOR IP): Performed by: NURSE PRACTITIONER

## 2020-06-13 PROCEDURE — 80307 DRUG TEST PRSMV CHEM ANLYZR: CPT

## 2020-06-13 PROCEDURE — 87633 RESP VIRUS 12-25 TARGETS: CPT

## 2020-06-13 PROCEDURE — 80053 COMPREHEN METABOLIC PANEL: CPT

## 2020-06-13 PROCEDURE — 74177 CT ABD & PELVIS W/CONTRAST: CPT

## 2020-06-13 PROCEDURE — 99285 EMERGENCY DEPT VISIT HI MDM: CPT

## 2020-06-13 PROCEDURE — 87081 CULTURE SCREEN ONLY: CPT

## 2020-06-13 PROCEDURE — 85379 FIBRIN DEGRADATION QUANT: CPT

## 2020-06-13 PROCEDURE — 96365 THER/PROPH/DIAG IV INF INIT: CPT

## 2020-06-13 PROCEDURE — 85025 COMPLETE CBC W/AUTO DIFF WBC: CPT

## 2020-06-13 PROCEDURE — C9113 INJ PANTOPRAZOLE SODIUM, VIA: HCPCS | Performed by: NURSE PRACTITIONER

## 2020-06-13 PROCEDURE — 87798 DETECT AGENT NOS DNA AMP: CPT

## 2020-06-13 PROCEDURE — 87486 CHLMYD PNEUM DNA AMP PROBE: CPT

## 2020-06-13 PROCEDURE — G0378 HOSPITAL OBSERVATION PER HR: HCPCS

## 2020-06-13 PROCEDURE — 82728 ASSAY OF FERRITIN: CPT

## 2020-06-13 PROCEDURE — 82962 GLUCOSE BLOOD TEST: CPT

## 2020-06-13 PROCEDURE — 85384 FIBRINOGEN ACTIVITY: CPT

## 2020-06-13 PROCEDURE — 96376 TX/PRO/DX INJ SAME DRUG ADON: CPT

## 2020-06-13 PROCEDURE — 2060000000 HC ICU INTERMEDIATE R&B

## 2020-06-13 PROCEDURE — 6360000004 HC RX CONTRAST MEDICATION: Performed by: PHYSICIAN ASSISTANT

## 2020-06-13 PROCEDURE — 87581 M.PNEUMON DNA AMP PROBE: CPT

## 2020-06-13 PROCEDURE — 85610 PROTHROMBIN TIME: CPT

## 2020-06-13 PROCEDURE — 84145 PROCALCITONIN (PCT): CPT

## 2020-06-13 PROCEDURE — 81001 URINALYSIS AUTO W/SCOPE: CPT

## 2020-06-13 PROCEDURE — 84484 ASSAY OF TROPONIN QUANT: CPT

## 2020-06-13 PROCEDURE — 87449 NOS EACH ORGANISM AG IA: CPT

## 2020-06-13 PROCEDURE — 96372 THER/PROPH/DIAG INJ SC/IM: CPT

## 2020-06-13 PROCEDURE — 6360000002 HC RX W HCPCS: Performed by: PHYSICIAN ASSISTANT

## 2020-06-13 PROCEDURE — 94640 AIRWAY INHALATION TREATMENT: CPT

## 2020-06-13 PROCEDURE — 87205 SMEAR GRAM STAIN: CPT

## 2020-06-13 PROCEDURE — 93005 ELECTROCARDIOGRAM TRACING: CPT | Performed by: PHYSICIAN ASSISTANT

## 2020-06-13 PROCEDURE — 2580000003 HC RX 258: Performed by: NURSE PRACTITIONER

## 2020-06-13 PROCEDURE — 83615 LACTATE (LD) (LDH) ENZYME: CPT

## 2020-06-13 PROCEDURE — 6360000002 HC RX W HCPCS: Performed by: NURSE PRACTITIONER

## 2020-06-13 PROCEDURE — 71275 CT ANGIOGRAPHY CHEST: CPT

## 2020-06-13 PROCEDURE — 2580000003 HC RX 258: Performed by: PHYSICIAN ASSISTANT

## 2020-06-13 PROCEDURE — 86141 C-REACTIVE PROTEIN HS: CPT

## 2020-06-13 PROCEDURE — 96367 TX/PROPH/DG ADDL SEQ IV INF: CPT

## 2020-06-13 RX ORDER — SODIUM CHLORIDE 0.9 % (FLUSH) 0.9 %
10 SYRINGE (ML) INJECTION PRN
Status: DISCONTINUED | OUTPATIENT
Start: 2020-06-13 | End: 2020-06-15 | Stop reason: HOSPADM

## 2020-06-13 RX ORDER — ACETAMINOPHEN 325 MG/1
650 TABLET ORAL EVERY 6 HOURS PRN
Status: DISCONTINUED | OUTPATIENT
Start: 2020-06-13 | End: 2020-06-15 | Stop reason: HOSPADM

## 2020-06-13 RX ORDER — SODIUM CHLORIDE 0.9 % (FLUSH) 0.9 %
10 SYRINGE (ML) INJECTION EVERY 12 HOURS SCHEDULED
Status: DISCONTINUED | OUTPATIENT
Start: 2020-06-13 | End: 2020-06-15 | Stop reason: HOSPADM

## 2020-06-13 RX ORDER — DEXTROSE MONOHYDRATE 25 G/50ML
12.5 INJECTION, SOLUTION INTRAVENOUS PRN
Status: DISCONTINUED | OUTPATIENT
Start: 2020-06-13 | End: 2020-06-15 | Stop reason: HOSPADM

## 2020-06-13 RX ORDER — INSULIN GLARGINE 100 [IU]/ML
30 INJECTION, SOLUTION SUBCUTANEOUS NIGHTLY
Status: DISCONTINUED | OUTPATIENT
Start: 2020-06-13 | End: 2020-06-15

## 2020-06-13 RX ORDER — GUAIFENESIN 600 MG/1
600 TABLET, EXTENDED RELEASE ORAL 2 TIMES DAILY
Status: DISCONTINUED | OUTPATIENT
Start: 2020-06-13 | End: 2020-06-15 | Stop reason: HOSPADM

## 2020-06-13 RX ORDER — PANTOPRAZOLE SODIUM 40 MG/10ML
40 INJECTION, POWDER, LYOPHILIZED, FOR SOLUTION INTRAVENOUS DAILY
Status: DISCONTINUED | OUTPATIENT
Start: 2020-06-13 | End: 2020-06-15 | Stop reason: HOSPADM

## 2020-06-13 RX ORDER — POLYETHYLENE GLYCOL 3350 17 G/17G
17 POWDER, FOR SOLUTION ORAL DAILY PRN
Status: DISCONTINUED | OUTPATIENT
Start: 2020-06-13 | End: 2020-06-15 | Stop reason: HOSPADM

## 2020-06-13 RX ORDER — ACETAMINOPHEN 650 MG/1
650 SUPPOSITORY RECTAL EVERY 6 HOURS PRN
Status: DISCONTINUED | OUTPATIENT
Start: 2020-06-13 | End: 2020-06-15 | Stop reason: HOSPADM

## 2020-06-13 RX ORDER — DEXTROSE MONOHYDRATE 50 MG/ML
100 INJECTION, SOLUTION INTRAVENOUS PRN
Status: DISCONTINUED | OUTPATIENT
Start: 2020-06-13 | End: 2020-06-15 | Stop reason: HOSPADM

## 2020-06-13 RX ORDER — PROMETHAZINE HYDROCHLORIDE 12.5 MG/1
12.5 TABLET ORAL EVERY 6 HOURS PRN
Status: DISCONTINUED | OUTPATIENT
Start: 2020-06-13 | End: 2020-06-14

## 2020-06-13 RX ORDER — PROMETHAZINE HYDROCHLORIDE 25 MG/1
12.5 TABLET ORAL EVERY 6 HOURS PRN
Status: DISCONTINUED | OUTPATIENT
Start: 2020-06-13 | End: 2020-06-15 | Stop reason: HOSPADM

## 2020-06-13 RX ORDER — KETOROLAC TROMETHAMINE 30 MG/ML
15 INJECTION, SOLUTION INTRAMUSCULAR; INTRAVENOUS ONCE
Status: COMPLETED | OUTPATIENT
Start: 2020-06-13 | End: 2020-06-13

## 2020-06-13 RX ORDER — 0.9 % SODIUM CHLORIDE 0.9 %
1000 INTRAVENOUS SOLUTION INTRAVENOUS ONCE
Status: COMPLETED | OUTPATIENT
Start: 2020-06-13 | End: 2020-06-13

## 2020-06-13 RX ORDER — ONDANSETRON 2 MG/ML
4 INJECTION INTRAMUSCULAR; INTRAVENOUS EVERY 6 HOURS PRN
Status: DISCONTINUED | OUTPATIENT
Start: 2020-06-13 | End: 2020-06-15 | Stop reason: HOSPADM

## 2020-06-13 RX ORDER — NICOTINE POLACRILEX 4 MG
15 LOZENGE BUCCAL PRN
Status: DISCONTINUED | OUTPATIENT
Start: 2020-06-13 | End: 2020-06-15 | Stop reason: HOSPADM

## 2020-06-13 RX ORDER — ONDANSETRON 2 MG/ML
4 INJECTION INTRAMUSCULAR; INTRAVENOUS ONCE
Status: COMPLETED | OUTPATIENT
Start: 2020-06-13 | End: 2020-06-13

## 2020-06-13 RX ORDER — INSULIN GLARGINE 100 [IU]/ML
0.25 INJECTION, SOLUTION SUBCUTANEOUS NIGHTLY
Status: DISCONTINUED | OUTPATIENT
Start: 2020-06-13 | End: 2020-06-13

## 2020-06-13 RX ORDER — SODIUM CHLORIDE 9 MG/ML
INJECTION, SOLUTION INTRAVENOUS CONTINUOUS
Status: DISCONTINUED | OUTPATIENT
Start: 2020-06-13 | End: 2020-06-14

## 2020-06-13 RX ORDER — ASPIRIN 81 MG/1
81 TABLET ORAL DAILY
Status: DISCONTINUED | OUTPATIENT
Start: 2020-06-13 | End: 2020-06-15 | Stop reason: HOSPADM

## 2020-06-13 RX ORDER — BENZONATATE 100 MG/1
200 CAPSULE ORAL 3 TIMES DAILY PRN
Status: DISCONTINUED | OUTPATIENT
Start: 2020-06-13 | End: 2020-06-15 | Stop reason: HOSPADM

## 2020-06-13 RX ORDER — ONDANSETRON 4 MG/1
4 TABLET, ORALLY DISINTEGRATING ORAL DAILY PRN
Status: DISCONTINUED | OUTPATIENT
Start: 2020-06-13 | End: 2020-06-15 | Stop reason: HOSPADM

## 2020-06-13 RX ADMIN — ACETAMINOPHEN 650 MG: 325 TABLET ORAL at 20:59

## 2020-06-13 RX ADMIN — ONDANSETRON 4 MG: 2 INJECTION INTRAMUSCULAR; INTRAVENOUS at 22:57

## 2020-06-13 RX ADMIN — SODIUM CHLORIDE: 9 INJECTION, SOLUTION INTRAVENOUS at 14:50

## 2020-06-13 RX ADMIN — ENOXAPARIN SODIUM 30 MG: 30 INJECTION SUBCUTANEOUS at 14:56

## 2020-06-13 RX ADMIN — ONDANSETRON 4 MG: 2 INJECTION INTRAMUSCULAR; INTRAVENOUS at 08:42

## 2020-06-13 RX ADMIN — PANTOPRAZOLE SODIUM 40 MG: 40 INJECTION, POWDER, FOR SOLUTION INTRAVENOUS at 14:55

## 2020-06-13 RX ADMIN — AZITHROMYCIN MONOHYDRATE 500 MG: 500 INJECTION, POWDER, LYOPHILIZED, FOR SOLUTION INTRAVENOUS at 13:49

## 2020-06-13 RX ADMIN — ENOXAPARIN SODIUM 30 MG: 30 INJECTION SUBCUTANEOUS at 20:59

## 2020-06-13 RX ADMIN — INSULIN LISPRO 6 UNITS: 100 INJECTION, SOLUTION INTRAVENOUS; SUBCUTANEOUS at 17:41

## 2020-06-13 RX ADMIN — SODIUM CHLORIDE, PRESERVATIVE FREE 10 ML: 5 INJECTION INTRAVENOUS at 21:03

## 2020-06-13 RX ADMIN — INSULIN LISPRO 2 UNITS: 100 INJECTION, SOLUTION INTRAVENOUS; SUBCUTANEOUS at 17:40

## 2020-06-13 RX ADMIN — IOPAMIDOL 80 ML: 755 INJECTION, SOLUTION INTRAVENOUS at 09:59

## 2020-06-13 RX ADMIN — IPRATROPIUM BROMIDE 2 PUFF: 17 AEROSOL, METERED RESPIRATORY (INHALATION) at 20:19

## 2020-06-13 RX ADMIN — KETOROLAC TROMETHAMINE 15 MG: 30 INJECTION, SOLUTION INTRAMUSCULAR; INTRAVENOUS at 08:42

## 2020-06-13 RX ADMIN — INSULIN GLARGINE 30 UNITS: 100 INJECTION, SOLUTION SUBCUTANEOUS at 22:58

## 2020-06-13 RX ADMIN — ASPIRIN 81 MG: 81 TABLET, COATED ORAL at 14:55

## 2020-06-13 RX ADMIN — CEFTRIAXONE SODIUM 1 G: 1 INJECTION, POWDER, FOR SOLUTION INTRAMUSCULAR; INTRAVENOUS at 12:07

## 2020-06-13 RX ADMIN — SODIUM CHLORIDE 1000 ML: 9 INJECTION, SOLUTION INTRAVENOUS at 08:42

## 2020-06-13 ASSESSMENT — PAIN SCALES - GENERAL
PAINLEVEL_OUTOF10: 3
PAINLEVEL_OUTOF10: 10
PAINLEVEL_OUTOF10: 10
PAINLEVEL_OUTOF10: 7
PAINLEVEL_OUTOF10: 0
PAINLEVEL_OUTOF10: 0

## 2020-06-13 ASSESSMENT — PAIN DESCRIPTION - PAIN TYPE
TYPE: ACUTE PAIN
TYPE: ACUTE PAIN

## 2020-06-13 ASSESSMENT — PAIN DESCRIPTION - LOCATION
LOCATION: CHEST
LOCATION: CHEST

## 2020-06-13 NOTE — ED PROVIDER NOTES
Did not see patient, interpreting EKG only: The Ekg interpreted by me shows  sinus tachycardia, adcl=955 with a rate of 105  Axis is   Normal  QTc is  normal  Intervals and Durations are unremarkable.       ST Segments: no acute change  No significant change from prior EKG dated 6-           Rosie Dee MD  06/13/20 1784

## 2020-06-13 NOTE — ED PROVIDER NOTES
eMERGENCY dEPARTMENT eNCOUnter        PCP: Renée Rivera MD    CHIEF COMPLAINT    Chief Complaint   Patient presents with    Chest Pain    Cough    Emesis       HPI    Bita Kapoor is a 44 y.o. male with past medical history of hypertension, diabetes mellitus who presents with chest pain, cough, nausea vomiting. Patient states symptoms began about 3 days ago. Cough is productive of sputum and he states that he has lower chest wall pain associated with this cough. He endorses shortness of breath with coughing as well. He states that he has been feeling generally unwell with body aches, headache, nausea and vomiting. He has been having normal bowel movements. He endorses epigastric abdominal pain. He is about 2-1/2 weeks postop from bilateral inguinal hernia repair with Dr. Carlos A Taylor and states that he has been healing well since the surgery, had a postop appointment 2 days ago. No known history of DVT/PE. No known cardiac history. REVIEW OF SYSTEMS    Constitutional:  Denies fever, chills. HENT:  Denies sore throat or ear pain   Cardiovascular:  See HPI.   Denies palpitations,  Denies syncope   Respiratory:   See HPI.  + shortness of breath with cough, no hemoptysis    GI:  + abdominal pain, nausea, vomiting, no diarrhea  :  Denies any urinary symptoms   Musculoskeletal:   Denies back pain,   Skin:  +sunburn to legs  Neurologic:  Denies lightheadedness, dizziness, headache, focal weakness or sensory changes   Endocrine:  Denies polyuria or polydypsia   Lymphatic:  Denies swollen glands     All other review of systems are negative  See HPI and nursing notes for additional information     PAST MEDICAL & SURGICAL HISTORY    Past Medical History:   Diagnosis Date    Callus of foot     Right foot - see's Dr. Giorgio Santa [facility]     type II - follows with PCP    Dizziness     positional    Essential hypertension     Follows with PCP    GERD (gastroesophageal reflux disease) Takes omeprazole    Lumbar radiculopathy      Past Surgical History:   Procedure Laterality Date    [facility]    DENTAL SURGERY      teeth extractions -half per patient    HERNIA REPAIR Bilateral 5/27/2020    BIALTERAL HERNIA INGUINAL REPAIR performed by Jhon De La Rosa MD at [facility] 2018    [place] OFFICE/OUTPT VISIT,PROCEDURE ONLY Left 4/17/2018    L5-S1 HEMILAMINECTOMY, REMOVAL OF DISC LEFT SIDE performed by Kymberly Rolle MD at [facility]         CURRENT MEDICATIONS    Current Outpatient Rx   Medication Sig Dispense Refill    promethazine (PHENERGAN) 12.5 MG tablet Take 1 tablet by mouth every 6 hours as needed for Nausea 15 tablet 0    ondansetron (ZOFRAN) 4 MG tablet Take 1 tablet by mouth daily as needed for Nausea or Vomiting 20 tablet 0    metFORMIN (GLUCOPHAGE) 500 MG tablet Take 2 tablets by mouth 2 times daily (with meals) 360 tablet 1    lisinopril-hydroCHLOROthiazide (PRINZIDE;ZESTORETIC) 20-12.5 MG per tablet TAKE 1 TABLET BY MOUTH ONCE DAILY 90 tablet 1    omeprazole (PRILOSEC) 20 MG delayed release capsule TAKE 1 CAPSULE BY MOUTH ONCE DAILY IN THE MORNING 90 capsule 1    glyBURIDE (DIABETA) 5 MG tablet Take 1 tablet by mouth 2 times daily (with meals) 180 tablet 1    aspirin 81 MG tablet Take 81 mg by mouth daily      acetaminophen (TYLENOL) 325 MG tablet Take 2 tablets by mouth every 4 hours as needed for Pain or Fever 120 tablet 3       ALLERGIES    No Known Allergies    SOCIAL & FAMILY HISTORY    Social History     Socioeconomic History    Marital status: Single     Spouse name: None    Number of children: None    Years of education: None    Highest education level: None   Occupational History    None   Social Needs    Financial resource strain: None    Food insecurity     Worry: None     Inability: None    Transportation needs     Medical: None     Non-medical: None   Tobacco Use    Smoking status: Never Smoker    Smokeless tobacco: Never Used   Substance and Sexual Activity    Alcohol use: Yes     Comment: \"couple beers a night\"    Drug use: No    Sexual activity: Yes     Partners: Female   Lifestyle    Physical activity     Days per week: None     Minutes per session: None    Stress: None   Relationships    Social connections     Talks on phone: None     Gets together: None     Attends Oriental orthodox service: None     Active member of club or organization: None     Attends meetings of clubs or organizations: None     Relationship status: None    Intimate partner violence     Fear of current or ex partner: None     Emotionally abused: None     Physically abused: None     Forced sexual activity: None   Other Topics Concern    None   Social History Narrative    None     Family History   Problem Relation Age of Onset    Heart Disease Father     Diabetes Father     Diabetes Mother        PHYSICAL EXAM    VITAL SIGNS: BP (!) 145/96   Pulse 89   Temp 98.8 °F (37.1 °C) (Oral)   Resp 25   Ht 6' (1.829 m)   Wt 180 lb (81.6 kg)   SpO2 100%   BMI 24.41 kg/m²    Constitutional:  Well developed, well nourished, no acute distress   HENT:  Atraumatic, moist mucus membranes  Neck/Lymphatics: supple, no JVD, no swollen nodes  Respiratory:  Lungs Clear, no retractions   Cardiovascular:  increased regular, regular Rhythm,  no murmurs/rubs/gallops  No carotid bruits or murmurs heard in carotids. Vascular: Radial pulses 2+ equal bilaterally  GI:  Soft, diffuse tenderness to palpation of abdomen without localization. No rigidity. No masses. Musculoskeletal:   Macular erythema consistent with first-degree sunburn to bilateral lower legs. Extremities neurovascularly intact with soft compartments. No palpable cords or tenderness palpation.   Integument:  Skin warm and dry, no petechiae   Neurologic:  Alert & oriented, normal speech  Psych: Pleasant affect, no hallucinations      EKG fluids, Zofran, Toradol. Lab work with mild hyponatremia with sodium of 133. Glucose elevated at 305 without anion gap. Troponin is negative. D-dimer elevated at 571. Imaging of chest and abdomen demonstrate no acute pulmonary emboli. There is mild aortic atherosclerosis with no aneurysm or dissection. Multifocal, peripheral pulmonary opacities and subcentimeter part solid nodules. Considerations include septic emboli versus organizing pneumonia. This could present possible vasculitis or acute pneumonia. Multiple part solid pulmonary nodules with the largest overall size measuring 0.6 cm and solid component measuring 0.3 cm. No acute abdominal/pelvic process. Severe hepatic steatosis. Above labs and imaging discussed with patient. He denies history of IV drug use, I would think septic emboli is unlikely, however this cannot be entirely excluded. Discussed this with patient. We will plan on COVID-19 testing and evaluating possible multifocal pneumonia versus septic emboli of lungs. Patient case discussed with Gillermina Dancer who agrees to admit for further evaluation and treatment of above. He started on azithromycin and Rocephin here in the ED, COVID-19 swab obtained. In consideration of current COVID19 pandemic, with effort to minimize unnecessary provider exposure, this patient was seen at bedside by me independently. However, in compliance with current hospital ETTA/ED protocol, prior to admission I did discuss this patient case with emergency department physician, Dr. Vasiliy Holloway. The patient and/or the family were informed of the results of any tests/labs/imaging, the treatment plan, and time was allotted to answer questions. See chart for details of medications given during the ED stay. Clinical  IMPRESSION    1. Chest pain, unspecified type    2. Multifocal pneumonia    3. Cough    4.  Non-intractable vomiting with nausea, unspecified vomiting type Admission        Comment: Please note this report has been produced using speech recognition software and may contain errors related to that system including errors in grammar, punctuation, and spelling, as well as words and phrases that may be inappropriate. If there are any questions or concerns please feel free to contact the dictating provider for clarification.       PERCY Chaparro  06/13/20 [address] [place] [address]  06/13/20 [facility]

## 2020-06-13 NOTE — PROGRESS NOTES
Pt came in for vomiting, headache and chest pain. Patient denies these symptoms at this time. Pt appears to comfortable c/o feeling hungry. Blood glucose at this time 227. Water provided and patient repositioned for comfort.

## 2020-06-13 NOTE — CONSULTS
And chest pain. Endocrinology   Consult Note  Dear Doctor  Maddie/ Panfilo Gloria    Thank You for the Consult     Pt. Was Admitted for : Cough, chest pain and vomiting    Reason for Consult: Better control of blood glucose      History Obtained From:  Patient/ EMR       HISTORY OF PRESENT ILLNESS:                The patient is a 44 y.o. male with significant past medical history o diabetes mellitus, hypertension and GERD and bilateral hernia repair recently who was admitted to hospital for chest pain cough and vomiting x-ray showed infiltrate in the lung suggestive of bilateral pneumonia patient was admitted to [facility]  unit   I was  consulted for better control of blood glucose. ROS:   Pt's ROS done in detail. Abnormal ROS are noted in Medical and Surgical History Section below: Other Medical History:        Diagnosis Date    Callus of foot     Right foot - see's Dr. Lianet Pringle Diabetes mellitus ([facility] [address]     type II - follows with PCP    Dizziness     positional    Essential hypertension     Follows with PCP    GERD (gastroesophageal reflux disease)     Takes omeprazole    Lumbar radiculopathy      Surgical History:        Procedure Laterality Date    [facility]    DENTAL SURGERY      teeth extractions -half per patient    HERNIA REPAIR Bilateral 5/27/2020    BIALTERAL HERNIA INGUINAL REPAIR performed by Rey Tyler MD at [address] [facility]  2018    [place] OFFICE/OUTPT VISIT,PROCEDURE ONLY Left 4/17/2018    L5-S1 HEMILAMINECTOMY, REMOVAL OF DISC LEFT SIDE performed by Deja Taylor MD at [address] EXTRACTION         Allergies:  Patient has no known allergies.     Family History:       Problem Relation Age of Onset    Heart Disease Father     Diabetes Father     Diabetes Mother      REVIEW OF SYSTEMS:  Review of System Done as noted above     PHYSICAL EXAM:      Vitals:    /88   Pulse 88   Temp 98.2 °F (36.8 °C) (Oral)

## 2020-06-13 NOTE — H&P
History and Physical      Name:  Grant Buchanan /Age/Sex: 1980  (44 y.o. male)   MRN & CSN:  [id number] & [id number] Admission Date/Time: 2020  8:23 AM   Location:  ED28/ED-28 PCP: Mulugeta Phillips MD       Admitting Physicians : Dr. Willian Murrieta is a 44 y.o.  male  who presents with Chest Pain; Cough; and Emesis    Assessment and Plan:   Chest pain likely multifocal pneumonia/septic emboli/possible COVID  CTA pulmonary: Multifocal peripheral pulmonary opacities and subcentimeter part solid nodules.  Considerations include septic emboli versus organizing pneumonia. This could represent possible vasculitis or acute pneumonia. UDS negative.   -Rocephin + Azithromycin   -Covid 19 pending  -MDI treatment  -Oxygen protocol  - Echo   -Blood and sputum cx  -Respiratory panel, Legionella, and strep pneumo  -Procalcitonin      S/P open bilateral IHR with mesh on 2020  -Incisional healing well    Uncontrolled DM2. Last A1c 10.9.     -Hold oral medication while inpatient  -Insulin Lantus and humalog.  -Hypoglycemia protocol  -Diabetic educator  -Endo consult    Other chronic issues   -Essential HTN  -GERD      DVT-PPX: Lovenox  Diet: Carb controlled                   Medications:   Medications:    azithromycin  500 mg Intravenous Once    cefTRIAXone (ROCEPHIN) IV  1 g Intravenous Once      Infusions:   PRN Meds:      Current Facility-Administered Medications:     azithromycin (ZITHROMAX) 500 mg in dextrose 5 % 250 mL IVPB, 500 mg, Intravenous, Once, [place] [place]    cefTRIAXone (ROCEPHIN) 1 g IVPB in 50 mL D5W minibag, 1 g, Intravenous, Once, [place], Last Rate: 100 mL/hr at 20 1207, 1 g at 20 1207    Current Outpatient Medications:     promethazine (PHENERGAN) 12.5 MG tablet, Take 1 tablet by mouth every 6 hours as needed for Nausea, Disp: 15 tablet, Rfl: 0    ondansetron (ZOFRAN) 4 MG tablet, Take 1 tablet by mouth daily as needed for Nausea or Vomiting, Disp: 20 tablet, Rfl: 0    metFORMIN (GLUCOPHAGE) 500 MG tablet, Take 2 tablets by mouth 2 times daily (with meals), Disp: 360 tablet, Rfl: 1    lisinopril-hydroCHLOROthiazide (PRINZIDE;ZESTORETIC) 20-12.5 MG per tablet, TAKE 1 TABLET BY MOUTH ONCE DAILY, Disp: 90 tablet, Rfl: 1    omeprazole (PRILOSEC) 20 MG delayed release capsule, TAKE 1 CAPSULE BY MOUTH ONCE DAILY IN THE MORNING, Disp: 90 capsule, Rfl: 1    glyBURIDE (DIABETA) 5 MG tablet, Take 1 tablet by mouth 2 times daily (with meals), Disp: 180 tablet, Rfl: 1    aspirin 81 MG tablet, Take 81 mg by mouth daily, Disp: , Rfl:     acetaminophen (TYLENOL) 325 MG tablet, Take 2 tablets by mouth every 4 hours as needed for Pain or Fever, Disp: 120 tablet, Rfl: 3    History of present illness     Chief Complaint: Chest Pain; Cough; and Emesis      Ciro Rangel is a 44 y.o.  male  who presents with intermittent cough and chest discomfort that started 3 days ago. Other associated symptoms are nausea, shortness of breath, and nonbilious nonbloody vomiting. Patient had outpatient inguinal hernia repair on 5/25 with Dr. John Boone and has been doing well since the surgery. Patient saw Dr. Maria L Maldonado 2 days ago for postop follow-up appointment. He did call his PCP and was told to come to ED as he was sick. Denies leg swelling,   Headache history of DVT/PE, or  Hemoptysis. Review of Systems   Ten point ROS reviewed and negative, unless as noted below. GENERAL:  Denies fever, chills, night sweats, or changes in weight. EYES:  Denies recent visual changes. ENT:  Denies ear pain, hearing loss or tinnitus  RESP:  +cough, dyspnea, or wheezing. CV:  + chest pain, palpitations, syncope, or edema. GI:  Denies any dysphagia, nausea, vomiting, abdominal pain, heartburn, changes in bowel habit, melena or rectal bleeding  MUSCULOSKELETAL:  Denies any joint swelling, joint pain, or loss of range of motion.   NEURO:  Denies any headaches, tremors, dizziness,

## 2020-06-13 NOTE — PROGRESS NOTES
Department of Speech/Language Pathology   Re: Parminder Console for [facility] screening noted per pneumonia pathway. Chart review completed, pt not seen by speech therapy. Please order swallowing evaluation with speech therapy if there are concerns for oropharyngeal dysphagia.    Anyi Khalil MA CCC-SLP,   6/13/2020  2:38 PM

## 2020-06-14 LAB
ALBUMIN SERPL-MCNC: 3.6 GM/DL (ref 3.4–5)
ALP BLD-CCNC: 33 IU/L (ref 40–128)
ALT SERPL-CCNC: 29 U/L (ref 10–40)
ANION GAP SERPL CALCULATED.3IONS-SCNC: 8 MMOL/L (ref 4–16)
APTT: 28.2 SECONDS (ref 25.1–37.1)
AST SERPL-CCNC: 26 IU/L (ref 15–37)
BASOPHILS ABSOLUTE: 0 K/CU MM
BASOPHILS RELATIVE PERCENT: 0.4 % (ref 0–1)
BILIRUB SERPL-MCNC: 0.8 MG/DL (ref 0–1)
BUN BLDV-MCNC: 5 MG/DL (ref 6–23)
CALCIUM SERPL-MCNC: 8.4 MG/DL (ref 8.3–10.6)
CHLORIDE BLD-SCNC: 102 MMOL/L (ref 99–110)
CO2: 29 MMOL/L (ref 21–32)
CREAT SERPL-MCNC: 0.6 MG/DL (ref 0.9–1.3)
DIFFERENTIAL TYPE: ABNORMAL
EOSINOPHILS ABSOLUTE: 0.1 K/CU MM
EOSINOPHILS RELATIVE PERCENT: 1.3 % (ref 0–3)
FIBRINOGEN LEVEL: 466 MG/DL (ref 196.9–442.1)
GFR AFRICAN AMERICAN: >60 ML/MIN/1.73M2
GFR NON-AFRICAN AMERICAN: >60 ML/MIN/1.73M2
GLUCOSE BLD-MCNC: 109 MG/DL (ref 70–99)
GLUCOSE BLD-MCNC: 154 MG/DL (ref 70–99)
GLUCOSE BLD-MCNC: 167 MG/DL (ref 70–99)
GLUCOSE BLD-MCNC: 168 MG/DL (ref 70–99)
GLUCOSE BLD-MCNC: 176 MG/DL (ref 70–99)
GLUCOSE BLD-MCNC: 191 MG/DL (ref 70–99)
HCT VFR BLD CALC: 35.8 % (ref 42–52)
HEMOGLOBIN: 12 GM/DL (ref 13.5–18)
IMMATURE NEUTROPHIL %: 0.4 % (ref 0–0.43)
INR BLD: 1.02 INDEX
LYMPHOCYTES ABSOLUTE: 1.9 K/CU MM
LYMPHOCYTES RELATIVE PERCENT: 26.7 % (ref 24–44)
MCH RBC QN AUTO: 31.7 PG (ref 27–31)
MCHC RBC AUTO-ENTMCNC: 33.5 % (ref 32–36)
MCV RBC AUTO: 94.5 FL (ref 78–100)
MONOCYTES ABSOLUTE: 0.6 K/CU MM
MONOCYTES RELATIVE PERCENT: 8.8 % (ref 0–4)
NUCLEATED RBC %: 0 %
PDW BLD-RTO: 11.4 % (ref 11.7–14.9)
PLATELET # BLD: 292 K/CU MM (ref 140–440)
PMV BLD AUTO: 9.8 FL (ref 7.5–11.1)
POTASSIUM SERPL-SCNC: 4.3 MMOL/L (ref 3.5–5.1)
PROTHROMBIN TIME: 12.3 SECONDS (ref 11.7–14.5)
RBC # BLD: 3.79 M/CU MM (ref 4.6–6.2)
SEGMENTED NEUTROPHILS ABSOLUTE COUNT: 4.5 K/CU MM
SEGMENTED NEUTROPHILS RELATIVE PERCENT: 62.4 % (ref 36–66)
SODIUM BLD-SCNC: 139 MMOL/L (ref 135–145)
TOTAL IMMATURE NEUTOROPHIL: 0.03 K/CU MM
TOTAL NUCLEATED RBC: 0 K/CU MM
TOTAL PROTEIN: 6.7 GM/DL (ref 6.4–8.2)
WBC # BLD: 7.2 K/CU MM (ref 4–10.5)

## 2020-06-14 PROCEDURE — 2580000003 HC RX 258: Performed by: INTERNAL MEDICINE

## 2020-06-14 PROCEDURE — 6370000000 HC RX 637 (ALT 250 FOR IP): Performed by: INTERNAL MEDICINE

## 2020-06-14 PROCEDURE — 93010 ELECTROCARDIOGRAM REPORT: CPT | Performed by: INTERNAL MEDICINE

## 2020-06-14 PROCEDURE — 6370000000 HC RX 637 (ALT 250 FOR IP): Performed by: NURSE PRACTITIONER

## 2020-06-14 PROCEDURE — 6360000002 HC RX W HCPCS: Performed by: NURSE PRACTITIONER

## 2020-06-14 PROCEDURE — C9113 INJ PANTOPRAZOLE SODIUM, VIA: HCPCS | Performed by: NURSE PRACTITIONER

## 2020-06-14 PROCEDURE — 82962 GLUCOSE BLOOD TEST: CPT

## 2020-06-14 PROCEDURE — G0378 HOSPITAL OBSERVATION PER HR: HCPCS

## 2020-06-14 PROCEDURE — 96376 TX/PRO/DX INJ SAME DRUG ADON: CPT

## 2020-06-14 PROCEDURE — 6360000002 HC RX W HCPCS: Performed by: INTERNAL MEDICINE

## 2020-06-14 PROCEDURE — 85384 FIBRINOGEN ACTIVITY: CPT

## 2020-06-14 PROCEDURE — 80053 COMPREHEN METABOLIC PANEL: CPT

## 2020-06-14 PROCEDURE — 6370000000 HC RX 637 (ALT 250 FOR IP): Performed by: PHYSICIAN ASSISTANT

## 2020-06-14 PROCEDURE — 85730 THROMBOPLASTIN TIME PARTIAL: CPT

## 2020-06-14 PROCEDURE — 96366 THER/PROPH/DIAG IV INF ADDON: CPT

## 2020-06-14 PROCEDURE — 85025 COMPLETE CBC W/AUTO DIFF WBC: CPT

## 2020-06-14 PROCEDURE — 85610 PROTHROMBIN TIME: CPT

## 2020-06-14 PROCEDURE — 94640 AIRWAY INHALATION TREATMENT: CPT

## 2020-06-14 PROCEDURE — 2060000000 HC ICU INTERMEDIATE R&B

## 2020-06-14 PROCEDURE — 96372 THER/PROPH/DIAG INJ SC/IM: CPT

## 2020-06-14 PROCEDURE — 94761 N-INVAS EAR/PLS OXIMETRY MLT: CPT

## 2020-06-14 RX ORDER — PROMETHAZINE HYDROCHLORIDE AND CODEINE PHOSPHATE 6.25; 1 MG/5ML; MG/5ML
5 SOLUTION ORAL EVERY 6 HOURS PRN
Status: DISCONTINUED | OUTPATIENT
Start: 2020-06-14 | End: 2020-06-15 | Stop reason: HOSPADM

## 2020-06-14 RX ORDER — LISINOPRIL AND HYDROCHLOROTHIAZIDE 20; 12.5 MG/1; MG/1
1 TABLET ORAL DAILY
Status: DISCONTINUED | OUTPATIENT
Start: 2020-06-14 | End: 2020-06-15 | Stop reason: HOSPADM

## 2020-06-14 RX ADMIN — ENOXAPARIN SODIUM 30 MG: 30 INJECTION SUBCUTANEOUS at 20:28

## 2020-06-14 RX ADMIN — ASPIRIN 81 MG: 81 TABLET, COATED ORAL at 08:43

## 2020-06-14 RX ADMIN — PANTOPRAZOLE SODIUM 40 MG: 40 INJECTION, POWDER, FOR SOLUTION INTRAVENOUS at 08:43

## 2020-06-14 RX ADMIN — LISINOPRIL AND HYDROCHLOROTHIAZIDE 1 TABLET: 12.5; 2 TABLET ORAL at 10:10

## 2020-06-14 RX ADMIN — INSULIN GLARGINE 30 UNITS: 100 INJECTION, SOLUTION SUBCUTANEOUS at 20:28

## 2020-06-14 RX ADMIN — BENZONATATE 200 MG: 100 CAPSULE ORAL at 02:35

## 2020-06-14 RX ADMIN — Medication 5 ML: at 22:34

## 2020-06-14 RX ADMIN — ACETAMINOPHEN 650 MG: 325 TABLET ORAL at 15:22

## 2020-06-14 RX ADMIN — GUAIFENESIN 600 MG: 600 TABLET, EXTENDED RELEASE ORAL at 20:28

## 2020-06-14 RX ADMIN — ENOXAPARIN SODIUM 30 MG: 30 INJECTION SUBCUTANEOUS at 08:42

## 2020-06-14 RX ADMIN — IPRATROPIUM BROMIDE 2 PUFF: 17 AEROSOL, METERED RESPIRATORY (INHALATION) at 07:40

## 2020-06-14 RX ADMIN — AZITHROMYCIN MONOHYDRATE 500 MG: 500 INJECTION, POWDER, LYOPHILIZED, FOR SOLUTION INTRAVENOUS at 08:42

## 2020-06-14 RX ADMIN — IPRATROPIUM BROMIDE 2 PUFF: 17 AEROSOL, METERED RESPIRATORY (INHALATION) at 19:48

## 2020-06-14 RX ADMIN — IPRATROPIUM BROMIDE 2 PUFF: 17 AEROSOL, METERED RESPIRATORY (INHALATION) at 12:08

## 2020-06-14 RX ADMIN — GUAIFENESIN 600 MG: 600 TABLET, EXTENDED RELEASE ORAL at 08:43

## 2020-06-14 RX ADMIN — IPRATROPIUM BROMIDE 2 PUFF: 17 AEROSOL, METERED RESPIRATORY (INHALATION) at 15:18

## 2020-06-14 RX ADMIN — CEFTRIAXONE 1 G: 1 INJECTION, POWDER, FOR SOLUTION INTRAMUSCULAR; INTRAVENOUS at 08:42

## 2020-06-14 RX ADMIN — GUAIFENESIN 600 MG: 600 TABLET, EXTENDED RELEASE ORAL at 01:32

## 2020-06-14 ASSESSMENT — PAIN SCALES - GENERAL
PAINLEVEL_OUTOF10: 0
PAINLEVEL_OUTOF10: 0
PAINLEVEL_OUTOF10: 4

## 2020-06-14 NOTE — PROGRESS NOTES
WaliServicky- Janis GREER  pt having coughing spells to were he can't catch his breath, can we get him something for cough please.

## 2020-06-14 NOTE — PROGRESS NOTES
Normal lobes   Lungs: Has Vesicular Breath sounds   Heart:  regular rate and rhythm  Abdomen: soft, non-tender; bowel sounds normal; no masses,  no organomegaly  Musculoskeletal: Normal  Extremities: extremities normal, , no edema  Neurologic:  Awake, alert, oriented to name, place and time. Cranial nerves II-XII are grossly intact. Motor is  intact. Sensory is intact. ,  and gait is normal.    Assessment:     Patient Active Problem List:     Unstable angina ([facility])     Lumbar back pain with radiculopathy affecting left lower extremity     S/P lumbar laminectomy     Type 2 diabetes mellitus with diabetic neuropathy, without long-term current use of insulin ([facility])     Essential hypertension     Lumbar radiculopathy     Gastroesophageal reflux disease without esophagitis     Diabetic foot ulcer ([address]     Septic embolism ([address] suspected      Plan:     1. Reviewed POC blood glucose . Labs and X ray results   2. Reviewed Current Medicines   3. On meal/ Correction bolus Humalog/ Basal Lantus Insulin regime  4. Monitor Blood glucose frequently   5. Modified  the dose of Insulin/ other medicines as needed   6. Will follow     .      Navid Sunshine MD

## 2020-06-14 NOTE — PROGRESS NOTES
Hospitalist Progress Note      Name:  Vic Colin /Age/Sex: 1980  (44 y.o. male)   MRN & CSN:  [id number] & [id number] Admission Date/Time: 2020  8:23 AM   Location:  -A PCP: Jaelyn Ragsdale MD         Hospital Day: 2    ASSESSMENT & PLAN:   Vic Colin is a 44 y.o.  male who presented with a complaint. Of dry cough, vomiting and headaches. The symptoms started about a week ago and has progressively got worse. #.  Nonproductive cough--- with vomiting and headaches. Afebrile. No leukocytosis. Not requiring oxygen. CT a pulmonary without evidence of pulmonary embolism. CT chest showed multifocal peripheral pulmonary opacities. Strep, Legionella urine antigen and respiratory viral panel negative. Urinalysis negative. No convincing evidence of pneumonia. Could be bronchitis. - Continue ceftriaxone and azithromycin, will probably switch to Augmentin  - COVID-19 pending  - Transfer to [place] [address] if COVID-19 negative    #. Uncontrolled DM2---A1c 10.9 on 2020. He only takes metformin at home.  -Hold metformin  -Hypoglycemic protocol  -Lantus 30 units bedtime  -Moderate dose sliding scale insulin    #. Hypertension--on lisinopril and hydrochlorothiazide    #. GERD--- on omeprazole    #.   incidental imaging findings  -Pulmonary nodules, needs  imaging followed on outpatient basis  -Severe hepatic steatosis  -Nonobstructing left nephrolithiasis      MEDICAL DECISION MAKING:  -Labs reviewed  -Imaging reviewed  -Level of risk moderate  Diet DIET CARB CONTROL;   DVT Prophylaxis [x] Lovenox, []  Heparin, [] SCDs, [] Ambulation   GI Prophylaxis [] PPI,  [] H2 Blocker,  [] Carafate,  [] Diet/Tube Feeds   Code Status Full Code   Disposition  Home   MDM [] Low, [x] Moderate,[]  High     Chief complaint/Interval History/ROS     Chief Complaint: Dry cough, vomiting, headaches      INTERVAL HISTORY: No change in clinical status. No fevers. ROS:   No chest pain.   No PRN  glucagon (rDNA), 1 mg, PRN  dextrose, 100 mL/hr, PRN  benzonatate, 200 mg, TID PRN      Electronically signed by Martha Eldridge MD on 6/14/2020 at 8:45 AM

## 2020-06-15 VITALS
HEART RATE: 94 BPM | SYSTOLIC BLOOD PRESSURE: 129 MMHG | WEIGHT: 175.04 LBS | TEMPERATURE: 98.1 F | RESPIRATION RATE: 21 BRPM | BODY MASS INDEX: 23.71 KG/M2 | HEIGHT: 72 IN | OXYGEN SATURATION: 99 % | DIASTOLIC BLOOD PRESSURE: 83 MMHG

## 2020-06-15 LAB
CULTURE: NORMAL
GLUCOSE BLD-MCNC: 102 MG/DL (ref 70–99)
GLUCOSE BLD-MCNC: 118 MG/DL (ref 70–99)
GLUCOSE BLD-MCNC: 149 MG/DL (ref 70–99)
LV EF: 53 %
LVEF MODALITY: NORMAL
Lab: NORMAL
SARS-COV-2: NOT DETECTED
SOURCE: NORMAL
SPECIMEN: NORMAL

## 2020-06-15 PROCEDURE — G0378 HOSPITAL OBSERVATION PER HR: HCPCS

## 2020-06-15 PROCEDURE — 93306 TTE W/DOPPLER COMPLETE: CPT

## 2020-06-15 PROCEDURE — 96372 THER/PROPH/DIAG INJ SC/IM: CPT

## 2020-06-15 PROCEDURE — C9113 INJ PANTOPRAZOLE SODIUM, VIA: HCPCS | Performed by: NURSE PRACTITIONER

## 2020-06-15 PROCEDURE — 2580000003 HC RX 258: Performed by: NURSE PRACTITIONER

## 2020-06-15 PROCEDURE — 6360000002 HC RX W HCPCS: Performed by: NURSE PRACTITIONER

## 2020-06-15 PROCEDURE — 82962 GLUCOSE BLOOD TEST: CPT

## 2020-06-15 PROCEDURE — 96376 TX/PRO/DX INJ SAME DRUG ADON: CPT

## 2020-06-15 PROCEDURE — 6360000002 HC RX W HCPCS: Performed by: INTERNAL MEDICINE

## 2020-06-15 PROCEDURE — 6370000000 HC RX 637 (ALT 250 FOR IP): Performed by: INTERNAL MEDICINE

## 2020-06-15 PROCEDURE — 6370000000 HC RX 637 (ALT 250 FOR IP): Performed by: NURSE PRACTITIONER

## 2020-06-15 PROCEDURE — 6370000000 HC RX 637 (ALT 250 FOR IP): Performed by: PHYSICIAN ASSISTANT

## 2020-06-15 PROCEDURE — 96366 THER/PROPH/DIAG IV INF ADDON: CPT

## 2020-06-15 PROCEDURE — 2580000003 HC RX 258: Performed by: INTERNAL MEDICINE

## 2020-06-15 RX ORDER — BENZONATATE 200 MG/1
200 CAPSULE ORAL 3 TIMES DAILY PRN
Qty: 21 CAPSULE | Refills: 0 | Status: SHIPPED | OUTPATIENT
Start: 2020-06-15 | End: 2020-06-22

## 2020-06-15 RX ORDER — INSULIN GLARGINE 100 [IU]/ML
25 INJECTION, SOLUTION SUBCUTANEOUS NIGHTLY
Status: DISCONTINUED | OUTPATIENT
Start: 2020-06-15 | End: 2020-06-15 | Stop reason: HOSPADM

## 2020-06-15 RX ORDER — GLUCOSAMINE HCL/CHONDROITIN SU 500-400 MG
CAPSULE ORAL
Qty: 100 STRIP | Refills: 0 | Status: ON HOLD | OUTPATIENT
Start: 2020-06-15 | End: 2022-04-27 | Stop reason: SDUPTHER

## 2020-06-15 RX ORDER — AMOXICILLIN AND CLAVULANATE POTASSIUM 875; 125 MG/1; MG/1
1 TABLET, FILM COATED ORAL 2 TIMES DAILY
Qty: 14 TABLET | Refills: 0 | Status: SHIPPED | OUTPATIENT
Start: 2020-06-15 | End: 2020-06-22

## 2020-06-15 RX ORDER — LANCETS 28 GAUGE
1 EACH MISCELLANEOUS DAILY
Qty: 100 EACH | Refills: 3 | Status: SHIPPED | OUTPATIENT
Start: 2020-06-15

## 2020-06-15 RX ORDER — INSULIN GLARGINE 100 [IU]/ML
25 INJECTION, SOLUTION SUBCUTANEOUS NIGHTLY
Qty: 1 VIAL | Refills: 3 | Status: SHIPPED | OUTPATIENT
Start: 2020-06-15 | End: 2020-07-23

## 2020-06-15 RX ORDER — GUAIFENESIN 600 MG/1
600 TABLET, EXTENDED RELEASE ORAL 2 TIMES DAILY
Qty: 14 TABLET | Refills: 0 | Status: SHIPPED | OUTPATIENT
Start: 2020-06-15 | End: 2020-06-22

## 2020-06-15 RX ADMIN — SODIUM CHLORIDE, PRESERVATIVE FREE 10 ML: 5 INJECTION INTRAVENOUS at 08:33

## 2020-06-15 RX ADMIN — ENOXAPARIN SODIUM 30 MG: 30 INJECTION SUBCUTANEOUS at 08:24

## 2020-06-15 RX ADMIN — CEFTRIAXONE 1 G: 1 INJECTION, POWDER, FOR SOLUTION INTRAMUSCULAR; INTRAVENOUS at 08:24

## 2020-06-15 RX ADMIN — Medication 5 ML: at 06:06

## 2020-06-15 RX ADMIN — PANTOPRAZOLE SODIUM 40 MG: 40 INJECTION, POWDER, FOR SOLUTION INTRAVENOUS at 08:26

## 2020-06-15 RX ADMIN — GUAIFENESIN 600 MG: 600 TABLET, EXTENDED RELEASE ORAL at 08:25

## 2020-06-15 RX ADMIN — ASPIRIN 81 MG: 81 TABLET, COATED ORAL at 08:25

## 2020-06-15 RX ADMIN — LISINOPRIL AND HYDROCHLOROTHIAZIDE 1 TABLET: 12.5; 2 TABLET ORAL at 08:27

## 2020-06-15 ASSESSMENT — PAIN SCALES - GENERAL
PAINLEVEL_OUTOF10: 0

## 2020-06-15 NOTE — PROGRESS NOTES
Nutrition Assessment (Low Risk)    Type and Reason for Visit: Initial, Positive Nutrition Screen(Decreased appetite)    Nutrition Recommendations:   · Continue current diet  · Follow up on future visits for BG and A1C results     Nutrition Assessment:  Patient assessed for nutritional risk. Deemed to be at low risk at this time. Will continue to monitor for changes in status. Pt has had poor appetite due to recent illness causing nausea and vomiting, however no significant weight loss. Was diagnosed with type 2 DM two years ago, most recent A1C was 10.9%. Pt wasn't feeling well upon entering, stated his BG was low (100 mg/dL), and was preparing to have lunch. Pt stated he hadn't ever had the diabetic diet explained to him, that he'd only received pamphlets. We reviewed which foods and beverages contained carbohydrates and I explained the importance of portion control with carbohydrate-rich foods. Pt does not want to eliminate carbohydrates from his diet, but is willing to try to eat smaller portions and try to replace with servings of vegetables. Pt is currently only taking Metformin at home but realizes he will now most likely need additional medications to manage his blood glucose.     Malnutrition Assessment:  · Malnutrition Status: No malnutrition    Nutrition Risk Level   Risk Level: Low    Nutrition Diagnosis:   · Problem: Altered nutrition-related lab values  · Etiology: Lack of prior nutrition-related education, Endocrine dysfunction    Signs and symptoms: Lab values(A1C 10.9%)    Nutrition Intervention:  Food and/or Delivery: Continue current diet  Nutrition Education/Counseling/Coordination of Care:  Continued Inpatient Monitoring, Coordination of Care, Education Initiated, Education Completed      Electronically signed by Denise Nevarez RD, LD on 6/15/20 at 2:59 PM EDT    Contact Number: [phone]

## 2020-06-15 NOTE — DISCHARGE SUMMARY
6/13/2020 10:09 am TECHNIQUE: CTA of the chest was performed after the administration of intravenous contrast.  Multiplanar reformatted images are provided for review. MIP images are provided for review. Dose modulation, iterative reconstruction, and/or weight based adjustment of the mA/kV was utilized to reduce the radiation dose to as low as reasonably achievable.; CT of the abdomen and pelvis was performed with the administration of intravenous contrast. Multiplanar reformatted images are provided for review. Dose modulation, iterative reconstruction, and/or weight based adjustment of the mA/kV was utilized to reduce the radiation dose to as low as reasonably achievable. COMPARISON: Chest radiograph 06/13/2020. CTPA 06/16/2018. CT lumbar spine 06/16/2018. HISTORY: ORDERING SYSTEM PROVIDED HISTORY: elevated ddimer, sob TECHNOLOGIST PROVIDED HISTORY: Reason for exam:->elevated ddimer, sob Reason for Exam: elevated ddimer, sob Acuity: Unknown Type of Exam: Unknown FINDINGS: CARDIOVASCULAR:  Normal heart size with no pericardial effusion. The pulmonary arteries are normal in size. There is adequate opacification of the pulmonary arteries to the subsegmental level. No acute intraluminal filling defect. The thoracic and abdominal aorta demonstrate atherosclerotic calcifications with no aneurysm or dissection. MEDIASTINUM:  No significant enlarged mediastinal or hilar lymph nodes. The esophagus appears normal. LUNGS/AIRWAYS:  The trachea and bronchi are patent. Mild pulmonary hyperinflation. No effusion or pneumothorax. There is mild bilateral dependent atelectasis. Bilateral asymmetric dependent pleural thickening more prominent on the right. There is multifocal bilateral superior predominant peripheral wedge-shaped opacities with a representative focus identified in the anterior right middle lobe measuring 1.2 x 1.7 cm on axial image 84.   A few of these demonstrate some internal gas foci which may alignment with thoracolumbar junction and lumbosacral junction degenerative disc and joint disease. No acute osseous abnormality. Normal pelvic alignment. 1. No acute pulmonary emboli. Mild aortic atherosclerosis with no aneurysm or dissection. 2. Multifocal peripheral pulmonary opacities and subcentimeter part solid nodules. Considerations include septic emboli versus organizing pneumonia. This could represent possible vasculitis or acute pneumonia. 3. Multiple part solid pulmonary nodules with the largest overall size measuring 0.6 cm and solid component measuring 0.3 cm. See recommendations. 4. No significant thoracic lymphadenopathy. 5. No acute abdominal/pelvic process. 6. Severe hepatic steatosis. Nonobstructing left nephrolithiasis. RECOMMENDATIONS: Fleischner Society guidelines for follow-up and management of incidentally detected subsolid pulmonary nodules: Multiple subsolid nodules < 6 mm - CT at 3-6 months. If stable, consider CT at 2 and 4 years. > than or equal to 6 mm - CT at 3-6 months. Subsequent management based on the most suspicious nodule(s). - Low risk patients include individuals with minimal or absent history of smoking and other known risk factors. - High risk patients include individuals with a history or smoking or known risk factors. Radiology 2017 http://pubs. rsna.org/doi/full/10.1148/radiol. 2777804635       Significant Diagnostic Studies at discharge:   CBC:   Lab Results   Component Value Date    WBC 7.2 06/14/2020    RBC 3.79 06/14/2020    HGB 12.0 06/14/2020    HCT 35.8 06/14/2020    MCV 94.5 06/14/2020    MCH 31.7 06/14/2020    MCHC 33.5 06/14/2020    RDW 11.4 06/14/2020     06/14/2020    MPV 9.8 06/14/2020       Patient Instructions:     Medication List      START taking these medications    AIMSCO INS SYR .3CC/29GX0.5\" 29G X 1/2\" 0.3 ML Misc  Generic drug:  Insulin Syringe-Needle U-100  1 each by Does not apply route daily     amoxicillin-clavulanate 875-125 MG per

## 2020-06-15 NOTE — PROGRESS NOTES
Hospitalist Progress Note      Name:  Bita Kapoor /Age/Sex: 1980  (44 y.o. male)   MRN & CSN:  [id number] & [id number] Admission Date/Time: 2020  8:23 AM   Location:  [address] [place] [address] PCP: Renée Rivera MD       Bita Kapoor is a 44 y.o.  male  who presents with Chest Pain; Cough; and Emesis      Assessment and Plan:   Nonproductive cough with vomiting and headaches. Afebrile. No leukocytosis. Not requiring oxygen. - clinically improved  - CTA pulmonary without evidence of pulmonary embolism. - CT chest showed multifocal peripheral pulmonary opacities  - Strep, Legionella urine antigen and respiratory viral panel negative. - Urinalysis negative.    -Continue ceftriaxone and azithromycin  - COVID-19 negative  - echo pending  - he denies and drugs or IVDA, UDS neg     Uncontrolled DM2---A1c 10.9 on 2020. He only takes metformin at home.   - improved  -Hold metformin  -Hypoglycemic protocol  -Lantus 25 units bedtime  - lispro 10 TID  -Moderate dose sliding scale insulin       S/P open bilateral IHR with mesh on 2020  HTN  GERD       Incidental imaging findings  -Pulmonary nodules, needs  imaging followed on outpatient basis  -Severe hepatic steatosis  -Nonobstructing left nephrolithiasis                 Diet DIET CARB CONTROL;   Code Status Full Code     Medications:   Medications:    insulin glargine  25 Units Subcutaneous Nightly    lisinopril-hydroCHLOROthiazide  1 tablet Oral Daily    aspirin  81 mg Oral Daily    pantoprazole  40 mg Intravenous Daily    sodium chloride flush  10 mL Intravenous 2 times per day    ipratropium  2 puff Inhalation 4x daily    enoxaparin  30 mg Subcutaneous BID    azithromycin  500 mg Intravenous Q24H    cefTRIAXone (ROCEPHIN) IV  1 g Intravenous Q24H    insulin lispro  10 Units Subcutaneous TID WC    insulin lispro  0-12 Units Subcutaneous TID WC    insulin lispro  0-6 Units Subcutaneous 2 times per day    guaiFENesin  600 mg

## 2020-06-17 LAB
EKG ATRIAL RATE: 105 BPM
EKG DIAGNOSIS: NORMAL
EKG P AXIS: 65 DEGREES
EKG P-R INTERVAL: 148 MS
EKG Q-T INTERVAL: 342 MS
EKG QRS DURATION: 98 MS
EKG QTC CALCULATION (BAZETT): 452 MS
EKG R AXIS: 26 DEGREES
EKG T AXIS: 18 DEGREES
EKG VENTRICULAR RATE: 105 BPM

## 2020-06-18 LAB
CULTURE: NORMAL
CULTURE: NORMAL
Lab: NORMAL
Lab: NORMAL
SPECIMEN: NORMAL
SPECIMEN: NORMAL

## 2020-07-09 ENCOUNTER — OFFICE VISIT (OUTPATIENT)
Dept: BARIATRICS/WEIGHT MGMT | Age: 40
End: 2020-07-09

## 2020-07-09 VITALS
HEART RATE: 95 BPM | OXYGEN SATURATION: 99 % | DIASTOLIC BLOOD PRESSURE: 88 MMHG | RESPIRATION RATE: 16 BRPM | SYSTOLIC BLOOD PRESSURE: 122 MMHG | HEIGHT: 72 IN | WEIGHT: 181 LBS | TEMPERATURE: 98.1 F | BODY MASS INDEX: 24.52 KG/M2

## 2020-07-09 PROCEDURE — 99024 POSTOP FOLLOW-UP VISIT: CPT | Performed by: SURGERY

## 2020-07-09 NOTE — PROGRESS NOTES
Post-Operative Clinic Note    Chief Complaint   Patient presents with    Follow Up After Procedure     2ND PO Aly Ing hernia @ [facility] 05/27/20         SUBJECTIVE:  Patient is here today for a post-operative visit. Patient is s/p open b/l inguinal hernia repair on 5/27/2020. Wanted to have his left incision looked at. Denies pain. Denies F/C. Denies drainage. Pt states he was recently hospitalized for dehydration and pneumonia. States he has recovered. Denies issues with his breathing at this time. Tolerating PO. No N/V.  +BM. States he is getting around fine -- he reports recently mowing his lawn with no issues.         Past Surgical History:   Procedure Laterality Date    [facility]IZATION  03/2018    [facility]    DENTAL SURGERY      teeth extractions -half per patient    HERNIA REPAIR Bilateral 5/27/2020    BIALTERAL HERNIA INGUINAL REPAIR performed by Torin Crowell MD at [address] [place] [address]    [place] OFFICE/OUTPT VISIT,PROCEDURE ONLY Left 4/17/2018    L5-S1 HEMILAMINECTOMY, REMOVAL OF DISC LEFT SIDE performed by Jostin Monroy MD at [facility] ([place], [place])       Past Medical History:   Diagnosis Date    Callus of foot     Right foot - see's Dr. Lnyne Thomason Diabetes mellitus ([facility] [address]     type II - follows with PCP    Dizziness     positional    Essential hypertension     Follows with PCP    GERD (gastroesophageal reflux disease)     Takes omeprazole    Lumbar radiculopathy      Family History   Problem Relation Age of Onset    Heart Disease Father     Diabetes Father     Diabetes Mother      Social History     Socioeconomic History    Marital status: Single     Spouse name: Not on file    Number of children: Not on file    Years of education: Not on file    Highest education level: Not on file   Occupational History    Not on file   Social Needs    Financial resource strain: Not on file    Food insecurity     Worry: Not on file     Inability:

## 2020-07-23 ENCOUNTER — OFFICE VISIT (OUTPATIENT)
Dept: FAMILY MEDICINE CLINIC | Age: 40
End: 2020-07-23
Payer: COMMERCIAL

## 2020-07-23 VITALS
WEIGHT: 179.1 LBS | BODY MASS INDEX: 25.07 KG/M2 | SYSTOLIC BLOOD PRESSURE: 112 MMHG | DIASTOLIC BLOOD PRESSURE: 74 MMHG | HEIGHT: 71 IN | OXYGEN SATURATION: 98 % | TEMPERATURE: 97.2 F | HEART RATE: 103 BPM

## 2020-07-23 PROCEDURE — 99214 OFFICE O/P EST MOD 30 MIN: CPT | Performed by: FAMILY MEDICINE

## 2020-07-23 RX ORDER — GLYBURIDE 5 MG/1
10 TABLET ORAL 2 TIMES DAILY WITH MEALS
Qty: 360 TABLET | Refills: 1 | Status: SHIPPED | OUTPATIENT
Start: 2020-07-23 | End: 2021-02-05 | Stop reason: SDUPTHER

## 2020-07-23 RX ORDER — LISINOPRIL AND HYDROCHLOROTHIAZIDE 20; 12.5 MG/1; MG/1
TABLET ORAL
Qty: 90 TABLET | Refills: 1 | Status: SHIPPED | OUTPATIENT
Start: 2020-07-23 | End: 2021-02-05 | Stop reason: SDUPTHER

## 2020-07-23 RX ORDER — OMEPRAZOLE 20 MG/1
CAPSULE, DELAYED RELEASE ORAL
Qty: 90 CAPSULE | Refills: 1 | Status: SHIPPED | OUTPATIENT
Start: 2020-07-23 | End: 2021-02-05 | Stop reason: SDUPTHER

## 2020-07-23 RX ORDER — GLYBURIDE 5 MG/1
5 TABLET ORAL
COMMUNITY
End: 2020-07-23 | Stop reason: SDUPTHER

## 2020-07-23 NOTE — PROGRESS NOTES
2020    Yosef Ratliff    Chief Complaint   Patient presents with    6 Month Follow-Up    Other     pt requesting allergy testing, due to severe cough occurs in the spring    Other     pt requesting referral to pulminologist. pt states hospital mentioned something about his breathing. pt denies sob or difficulty breathing       HPI    Amarilis Gaines is a 44 y.o. male who presents today with follow-up. Patient remains poorly compliant with attempting to improve his diabetes. 2 months ago at patient's preop for his foot we asked him to increase his glyburide to 5 mg twice daily. He presents today 2-1/2 months later still on 5 mg daily. During 1 of his hospitalizations he was on 55 units of insulin. He did not remain on any of that insulin and went back to his 5 mg a day of glyburide. Patient is well aware of the complications of severe diabetes as both of his parents were in his father  of a diabetic foot ulcer. Patient feels that he is heading towards disability as he feels his diet podiatry states that he cannot work on his foot. I asked patient that he needs to get very aggressive with his diabetic management in order to get his foot healed and then we would have the disability question. Patient feels that he would be better off working. Patient does not have a list of home blood pressures. Patient's current about concern about cough and shortness of breath related to his admission to the hospital in . At that point he was asked to get a follow-up chest CT in 6 months. Patient does not have a diagnosis of asthma or COPD. Patient has no pulmonary function test and no inhalers. I persuaded patient that all of these need done. He is not even on an antihistamine. Be an appropriate to consult is requested pulmonology and allergy at this point.         REVIEW OF SYSTEMS    Constitutional:  Denies fever, chills, weight loss or weakness  Eyes:  no photophobia or discharge  ENT:  no sore throat or ear pain  Cardiovascular:  Denies chest pain, palpitations or swelling  Respiratory:  Denies cough or shortness of breath  GI:  no abdominal pain, nausea, vomiting, or diarrhea  Musculoskeletal:  no back pain  Skin:  No rashes  Neurologic:  no headache, focal weakness, or sensory changes  Endocrine:  no polyuria or polydipsia      PAST MEDICAL HISTORY  Past Medical History:   Diagnosis Date    Callus of foot     Right foot - see's Dr. Dionicio Lazar Dizziness     positional    Essential hypertension     Follows with PCP    Lumbar radiculopathy        FAMILY HISTORY  Family History   Problem Relation Age of Onset    Heart Disease Father     Diabetes Father     Diabetes Mother        SOCIAL HISTORY  Social History     Socioeconomic History    Marital status: Single     Spouse name: Not on file    Number of children: Not on file    Years of education: Not on file    Highest education level: Not on file   Occupational History    Not on file   Social Needs    Financial resource strain: Not on file    Food insecurity     Worry: Not on file     Inability: Not on file    Transportation needs     Medical: Not on file     Non-medical: Not on file   Tobacco Use    Smoking status: Never Smoker    Smokeless tobacco: Never Used   Substance and Sexual Activity    Alcohol use: Yes     Comment: \"couple beers a night\"    Drug use: No    Sexual activity: Yes     Partners: Female   Lifestyle    Physical activity     Days per week: Not on file     Minutes per session: Not on file    Stress: Not on file   Relationships    Social connections     Talks on phone: Not on file     Gets together: Not on file     Attends Islam service: Not on file     Active member of club or organization: Not on file     Attends meetings of clubs or organizations: Not on file     Relationship status: Not on file    Intimate partner violence     Fear of current or ex partner: Not on file     Emotionally abused: Not on file Physically abused: Not on file     Forced sexual activity: Not on file   Other Topics Concern    Not on file   Social History Narrative    Not on file        SURGICAL HISTORY  Past Surgical History:   Procedure Laterality Date    [facility]    DENTAL SURGERY      teeth extractions -half per patient    HERNIA REPAIR Bilateral 5/27/2020    BIALTERAL HERNIA INGUINAL REPAIR performed by [facility]imo [facility] at [facility]  2018    [place] OFFICE/OUTPT VISIT,PROCEDURE ONLY Left 4/17/2018    L5-S1 HEMILAMINECTOMY, REMOVAL OF DISC LEFT SIDE performed by Yeimy Argueta MD at [address] [place] [address]  Current Outpatient Medications   Medication Sig Dispense Refill    omeprazole (PRILOSEC) 20 MG delayed release capsule TAKE 1 CAPSULE BY MOUTH ONCE DAILY IN THE MORNING 90 capsule 1    lisinopril-hydroCHLOROthiazide (PRINZIDE;ZESTORETIC) 20-12.5 MG per tablet TAKE 1 TABLET BY MOUTH ONCE DAILY 90 tablet 1    metFORMIN (GLUCOPHAGE) 500 MG tablet Take 2 tablets by mouth 2 times daily (with meals) 360 tablet 1    glyBURIDE (DIABETA) 5 MG tablet Take 2 tablets by mouth 2 times daily (with meals) 360 tablet 1    blood glucose monitor kit and supplies Dispense sufficient amount for indicated testing frequency plus additional to accommodate PRN testing needs. Dispense all needed supplies to include: monitor, strips, lancing device, lancets, control solutions, alcohol swabs. 1 kit 0    blood glucose monitor strips Test 2 times a day & as needed for symptoms of irregular blood glucose. Dispense sufficient amount for indicated testing frequency plus additional to accommodate PRN testing needs.  100 strip 0    FreeStyle Lancets MISC 1 each by Does not apply route daily 100 each 3    aspirin 81 MG tablet Take 81 mg by mouth daily      acetaminophen (TYLENOL) 325 MG tablet Take 2 tablets by mouth every 4 hours as needed for Pain or Fever 120 tablet 3     No current facility-administered medications for this visit. ALLERGIES  No Known Allergies    PHYSICAL EXAM    /74   Pulse 103   Temp 97.2 °F (36.2 °C)   Ht 5' 11\" (1.803 m)   Wt 179 lb 1.6 oz (81.2 kg)   SpO2 98%   BMI 24.98 kg/m²     Constitutional:  Well developed, well nourished  HEENT:  Normocephalic, atraumatic, bilateral external ears normal, oropharynx moist, nose normal  Neck:  Normal range of motion, no tenderness, supple  Lymphatic:  No lymphadenopathy noted  Cardiovascular:  Normal heart rate, S1S2 nl  Thorax & Lungs:  Normal breath sounds, no respiratory distress, no wheezing  Skin:  Warm, dry, no erythema, no rash  Back:  straight  Extremities: Right foot ulcer being worked on by podiatry wrapped up  Musculoskeletal:  Good range of motion   Neurologic:  Alert & oriented X 3      ASSESSMENT & PLAN    1. Type 2 diabetes mellitus with diabetic neuropathy, without long-term current use of insulin ([facility])  Increase glyburide to 5 mg twice daily immediately in 4 days moved to 2 pills in the morning and 1 in the evening and then another week which would be a total 11 days from now maxed out at [employer] ride at 5 mg 2 pills twice daily. Patient to call in 3 weeks with results of that. At that point would try adding on Actos after discussing risks and benefits. At that point he would have 3 generic medications and if failing would move onto insulin at next office visit in 2 months. Patient needs to be compliant with all of the steps to be able to make that happen. .    - metFORMIN (GLUCOPHAGE) 500 MG tablet; Take 2 tablets by mouth 2 times daily (with meals)  Dispense: 360 tablet; Refill: 1  - glyBURIDE (DIABETA) 5 MG tablet; Take 2 tablets by mouth 2 times daily (with meals)  Dispense: 360 tablet; Refill: 1    2. Gastroesophageal reflux disease without esophagitis  Issue controlled. Continue meds. Refilled meds.   - omeprazole (PRILOSEC) 20 MG delayed release capsule; TAKE 1 CAPSULE BY MOUTH ONCE DAILY IN THE MORNING  Dispense: 90 capsule; Refill: 1    3. Essential hypertension  Issue controlled. Continue meds. Refilled meds. - lisinopril-hydroCHLOROthiazide (PRINZIDE;ZESTORETIC) 20-12.5 MG per tablet; TAKE 1 TABLET BY MOUTH ONCE DAILY  Dispense: 90 tablet; Refill: 1    4. Cough  PICC line patient's request for pulmonology and allergy consultants. Get PFTs. Asked patient to remind us on follow-up to set up December chest CT  - Full PFT Study With Bronchodilator;  Future    Follow-up 3 weeks       Electronically signed by Jose Victor MD on 7/23/2020

## 2020-07-28 ENCOUNTER — TELEPHONE (OUTPATIENT)
Dept: FAMILY MEDICINE CLINIC | Age: 40
End: 2020-07-28

## 2020-07-28 NOTE — TELEPHONE ENCOUNTER
Pt is sri for a PFT on Friday Aug 28th-- will need to put covid testing orders in for pt-- and he will have to have it 72-96 hours prior to the PFT-- please put orders in and call to let pt know of plans

## 2020-08-11 ENCOUNTER — HOSPITAL ENCOUNTER (OUTPATIENT)
Dept: WOUND CARE | Age: 40
Discharge: HOME | End: 2020-08-11
Payer: COMMERCIAL

## 2020-08-11 VITALS
RESPIRATION RATE: 18 BRPM | DIASTOLIC BLOOD PRESSURE: 98 MMHG | SYSTOLIC BLOOD PRESSURE: 152 MMHG | HEART RATE: 91 BPM | TEMPERATURE: 98.1 F | HEIGHT: 71 IN | BODY MASS INDEX: 25.06 KG/M2 | WEIGHT: 179 LBS

## 2020-08-11 PROBLEM — E11.621 DIABETIC ULCER OF RIGHT MIDFOOT ASSOCIATED WITH TYPE 2 DIABETES MELLITUS, WITH FAT LAYER EXPOSED (HCC): Status: ACTIVE | Noted: 2020-08-11

## 2020-08-11 PROBLEM — L97.412 DIABETIC ULCER OF RIGHT MIDFOOT ASSOCIATED WITH TYPE 2 DIABETES MELLITUS, WITH FAT LAYER EXPOSED (HCC): Status: ACTIVE | Noted: 2020-08-11

## 2020-08-11 PROCEDURE — 99203 OFFICE O/P NEW LOW 30 MIN: CPT | Performed by: NURSE PRACTITIONER

## 2020-08-11 PROCEDURE — 11042 DBRDMT SUBQ TIS 1ST 20SQCM/<: CPT | Performed by: NURSE PRACTITIONER

## 2020-08-11 PROCEDURE — 99213 OFFICE O/P EST LOW 20 MIN: CPT

## 2020-08-11 PROCEDURE — 11042 DBRDMT SUBQ TIS 1ST 20SQCM/<: CPT

## 2020-08-11 NOTE — PROGRESS NOTES
[facility] Initial Visit      Stefan Johnson  AGE: 44 y.o. GENDER: male  : 1980  EPISODE DATE:  2020   Referred by: Dr. Kristan Matthew    Subjective:     Doy Screws RIGHT MIDFOOT     HISTORY of PRESENT ILLNESS      Stefan Johnson is a 44 y.o. male who presents to the [address] [place] [address] for an initial visit for evaluation and treatment of Chronic diabetic and pressure ulcer of  Right midfoot. The condition is of moderate severity. The ulcer has been present since may 2020. The underlying cause is thought to be diabetes and pressure. The patients care to date has included routine wound care per Dr. Kritsan Matthew with debridement, collagen dressings, off loading with an off loading shoe and crutches. He also was on oral antibiotics at one time but can't remember the name. The patient has significant underlying medical conditions as below. The patient is being considered as a candidate for debridement and use of a total contact cast if indicated.   Wound Pain Timing/Severity: waxing and waning  Quality of pain: aching, tender  Severity of pain:  2 / 10   Modifying Factors: diabetes, chronic pressure and shear force  Associated Signs/Symptoms: pain        PAST MEDICAL HISTORY        Diagnosis Date    Callus of foot     Right foot - see's Dr. Luis Loja Dizziness     positional    Essential hypertension     Follows with PCP    Lumbar radiculopathy        PAST SURGICAL HISTORY    Past Surgical History:   Procedure Laterality Date    [place]    [facility]    DENTAL SURGERY      teeth extractions -half per patient    HERNIA REPAIR Bilateral 2020    BIALTERAL HERNIA INGUINAL REPAIR performed by Rayne Tuttle MD at [address] [place] [address]  2018    [place] OFFICE/OUTPT VISIT,PROCEDURE ONLY Left 2018    L5-S1 HEMILAMINECTOMY, REMOVAL OF DISC LEFT SIDE performed by Jordana Bradley MD at [address] [place] [address] diabetes mellitus with diabetic neuropathy, without long-term current use of insulin (HCC)    Essential hypertension    Lumbar radiculopathy    Gastroesophageal reflux disease without esophagitis    Diabetic foot ulcer ([facility] [address]    Septic embolism (HCC)    Diabetic ulcer of right midfoot associated with type 2 diabetes mellitus, with fat layer exposed ([facility] [address]       REVIEW OF SYSTEMS    Constitutional: negative for anorexia, chills, fatigue, fevers, malaise, sweats and weight loss  Respiratory: negative for cough, dyspnea on exertion, hemoptysis, pleurisy/chest pain, shortness of breath, sputum, stridor and wheezing  Cardiovascular: negative for chest pain, chest pressure/discomfort, claudication, dyspnea, exertional chest pressure/discomfort, irregular heart beat, near-syncope, orthopnea, palpitations, paroxysmal nocturnal dyspnea, syncope and tachypnea  Integument/breast: positive for skin lesion(s) right plantar midfoot wound      Objective:      BP (!) 152/98   Pulse 91   Temp 98.1 °F (36.7 °C) (Temporal)   Resp 18   Ht 5' 11\" (1.803 m)   Wt 179 lb (81.2 kg)   BMI 24.97 kg/m²     PHYSICAL EXAM  General Appearance: alert and oriented to person, place and time, well-developed and well-nourished, in no acute distress  Pulmonary/Chest: clear to auscultation bilaterally- no wheezes, rales or rhonchi, normal air movement, no respiratory distress  Cardiovascular: normal rate, normal S1 and S2, no gallops and intact distal pulses  Dermatologic exam: Visual inspection of the periwound reveals the skin to be normal in turgor and texture  Wound exam: see wound description below in procedure note      Assessment:       Valeri Smalls  appears to have a non-healing wound of the right plantar midfoot. The etiology of the wound is felt to be diabetic and pressure. There are multiple complicating factors including diabetes, poor glucose control, chronic pressure and shear force.   A comprehensive wound management program would be helpful to heal this wound. Assessments completed include fall risk and nutritional, functional,and psychological status. At this time appropriate care would include: periodic debridement and wound care as below. Procedure Note    Indications:  Based on my examination of this patient's wound(s) today, sharp excision into necrotic subcutaneous tissue is required to promote healing and evaluate the extent of previous healing. Performed by: RAFAEL Michel CNP    Consent obtained: Yes    Time out taken:  Yes    Pain Control: none needed       Debridement:Excisional Debridement    Using curette the wound(s) was/were sharply debrided down through and including the removal of subcutaneous tissue. Devitalized Tissue Debrided:  slough and exudate    Pre Debridement Measurements:  Are located in the Wound Documentation Flow Sheet    All active wounds listed below with today's date are evaluated  Wound(s)    debrided this date include # : 1     Post  Debridement Measurements:  Wound 08/11/20 Right;Plantar #1 right plantar (Active)   Wound Image   08/11/20 1052   Wound Diabetic Mathias 2 08/11/20 1052   Dressing Status Clean;Dry; Intact 08/11/20 1131   Dressing Changed Changed/New 08/11/20 1131   Wound Cleansed Rinsed/Irrigated with saline 08/11/20 1052   Wound Length (cm) 1 cm 08/11/20 1052   Wound Width (cm) 0.9 cm 08/11/20 1052   Wound Depth (cm) 0.2 cm 08/11/20 1052   Wound Surface Area (cm^2) 0.9 cm^2 08/11/20 1052   Wound Volume (cm^3) 0.18 cm^3 08/11/20 1052   Distance Tunneling (cm) 0 cm 08/11/20 1052   Tunneling Position ___ O'Clock 0 08/11/20 1052   Undermining Starts ___ O'Clock 0 08/11/20 1052   Undermining Ends___ O'Clock 0 08/11/20 1052   Undermining Maxium Distance (cm) 0 08/11/20 1052   Wound Assessment Pink 08/11/20 1052   Drainage Amount Moderate 08/11/20 1052   Drainage Description Serosanguinous 08/11/20 1052   Odor None 08/11/20 1052   Margins Defined edges 08/11/20 1052 Rosalie-wound Assessment Pink 08/11/20 1052   Non-staged Wound Description Full thickness 08/11/20 1052   [place]%Wound Bed 100 08/11/20 1052   Red%Wound Bed 0 08/11/20 1052   Yellow%Wound Bed 0 08/11/20 1052   Black%Wound Bed 0 08/11/20 1052   Purple%Wound Bed 0 08/11/20 1052   Other%Wound Bed 0 08/11/20 1052   Number of days: 0       Percent of Wound(s) Debrided: approximately 100%    Total  Area  Debrided:  0.9 sq cm     Bleeding:  Minimal    Hemostasis Achieved:  by pressure    Procedural Pain:  0  / 10     Post Procedural Pain:  0 / 10     Response to treatment:  Well tolerated by patient. Problem List Items Addressed This Visit     Type 2 diabetes mellitus with diabetic neuropathy, without long-term current use of insulin ([address] - Primary    Diabetic ulcer of right midfoot associated with type 2 diabetes mellitus, with fat layer exposed ([address]          Wound 08/11/20 Right;Plantar #1 right plantar (Active)   Wound Image   08/11/20 1052   Wound Diabetic Mathias 2 08/11/20 1052   Dressing Status Clean;Dry; Intact 08/11/20 1131   Dressing Changed Changed/New 08/11/20 1131   Wound Cleansed Rinsed/Irrigated with saline 08/11/20 1052   Wound Length (cm) 1 cm 08/11/20 1052   Wound Width (cm) 0.9 cm 08/11/20 1052   Wound Depth (cm) 0.2 cm 08/11/20 1052   Wound Surface Area (cm^2) 0.9 cm^2 08/11/20 1052   Wound Volume (cm^3) 0.18 cm^3 08/11/20 1052   Distance Tunneling (cm) 0 cm 08/11/20 1052   Tunneling Position ___ O'Clock 0 08/11/20 1052   Undermining Starts ___ O'Clock 0 08/11/20 1052   Undermining Ends___ O'Clock 0 08/11/20 1052   Undermining Maxium Distance (cm) 0 08/11/20 1052   Wound Assessment Pink 08/11/20 1052   Drainage Amount Moderate 08/11/20 1052   Drainage Description Serosanguinous 08/11/20 1052   Odor None 08/11/20 1052   Margins Defined edges 08/11/20 1052   Rosalie-wound Assessment Pink 08/11/20 1052   Non-staged Wound Description Full thickness 08/11/20 1052   [place]%Wound Bed 100 08/11/20 1052   Red%Wound Bed 0 08/11/20 1052   Yellow%Wound Bed 0 08/11/20 1052   Black%Wound Bed 0 08/11/20 1052   Purple%Wound Bed 0 08/11/20 1052   Other%Wound Bed 0 08/11/20 1052   Number of days: 0     Application of cast:    With the indications of casting being present and no contraindications, information and  instructions were given to the patient and the patient consented to the treatment. After proper wound care and appropriate padding. A total contact cast was applied according to protocol. Fall prevention safety and instructions on using various supportive walking devices were discussed with the patient. The patient was also instructed on what to do and how to get help if the cast became uncomfortable. The patient expressed understanding of all of these issues. The patient tolerated the procedure well. Spoke with Dr. Yann Diamond, plan for 3 TCC then he will see Dr. Yann Diamond again. Applied today will reassess Thursday and reapply. The patient is a diabetic, he state he checks his blood sugar 1-2 times daily, today he was 205, normally he states 200-290's. Last Hemoglobin A1c was 10.9 on 3/13/2020. He is on oral therapy only. He is not a smoker and is not on anticoagulants. Discussed the importance of better diabetic control for wound healing. Diabetic education provided  Diabetes education provided today:    Diabetic Neuropathy: signs and therapy. Foot care: advised to wash feet daily, pat dry and apply lotion at night, avoiding between toes. Need to look at feet daily and report to a physician any signs of inflammation or skin damage. Discussed diabetes shoes and socks. Carbs: good carbs and bad carbs, importance of carb counting, incorporation of protein with each meal to reduce Glycemic index, importance of portions, Carb/insulin ratio. Managing high and low sugar readings.   Effects of high glucose levels (uncontrolled diabetes) on wound healing    Plan:     Discharge instructions:    Discharge Instructions

## 2020-08-13 ENCOUNTER — HOSPITAL ENCOUNTER (OUTPATIENT)
Dept: WOUND CARE | Age: 40
Discharge: HOME | End: 2020-08-13
Payer: COMMERCIAL

## 2020-08-13 VITALS
SYSTOLIC BLOOD PRESSURE: 136 MMHG | TEMPERATURE: 97.8 F | DIASTOLIC BLOOD PRESSURE: 90 MMHG | RESPIRATION RATE: 18 BRPM | HEART RATE: 103 BPM

## 2020-08-13 PROCEDURE — 11042 DBRDMT SUBQ TIS 1ST 20SQCM/<: CPT | Performed by: NURSE PRACTITIONER

## 2020-08-13 PROCEDURE — 29445 APPL RIGID TOT CNTC LEG CAST: CPT

## 2020-08-14 NOTE — PROGRESS NOTES
[facility] Progress Note With Procedure    Melo Enriquez  AGE: 44 y.o. GENDER: male  : 1980  EPISODE DATE:  2020     Subjective:     Chief Complaint   Patient presents with    Wound Check     right leg          HISTORY of PRESENT ILLNESS      Melo Enriquez is a 44 y.o. male who presents today for wound evaluation of Chronic diabetic and pressure ulcer(s) of the right midfoot. The ulcer is of moderate severity. The underlying cause of the wound is diabetes and pressure.     Wound Pain Timing/Severity: waxing and waning  Quality of pain: aching, tender  Severity of pain:  2 / 10   Modifying Factors: diabetes, chronic pressure and shear force  Associated Signs/Symptoms: pain        PAST MEDICAL HISTORY        Diagnosis Date    Callus of foot     Right foot - see's Dr. Graham Albarran Dizziness     positional    Essential hypertension     Follows with PCP    Lumbar radiculopathy        PAST SURGICAL HISTORY    Past Surgical History:   Procedure Laterality Date    [facility]    DENTAL SURGERY      teeth extractions -half per patient    HERNIA REPAIR Bilateral 2020    BIALTERAL HERNIA INGUINAL REPAIR performed by Alivia Theodore MD at [address] [place] [address]      [place] OFFICE/OUTPT VISIT,PROCEDURE ONLY Left 2018    L5-S1 HEMILAMINECTOMY, REMOVAL OF DISC LEFT SIDE performed by Julio Mendes MD at [address] [place] [address] Road HISTORY    Family History   Problem Relation Age of Onset    Heart Disease Father     Diabetes Father     Diabetes Mother        SOCIAL HISTORY    Social History     Tobacco Use    Smoking status: Never Smoker    Smokeless tobacco: Never Used   Substance Use Topics    Alcohol use: Yes     Comment: \"couple beers a night\"    Drug use: No       ALLERGIES    No Known Allergies    MEDICATIONS    Current Outpatient Medications on File Prior to Encounter   Medication Sig Dispense Refill    omeprazole (PRILOSEC) 20 MG delayed release capsule TAKE 1 CAPSULE BY MOUTH ONCE DAILY IN THE MORNING 90 capsule 1    lisinopril-hydroCHLOROthiazide (PRINZIDE;ZESTORETIC) 20-12.5 MG per tablet TAKE 1 TABLET BY MOUTH ONCE DAILY 90 tablet 1    metFORMIN (GLUCOPHAGE) 500 MG tablet Take 2 tablets by mouth 2 times daily (with meals) 360 tablet 1    glyBURIDE (DIABETA) 5 MG tablet Take 2 tablets by mouth 2 times daily (with meals) 360 tablet 1    blood glucose monitor kit and supplies Dispense sufficient amount for indicated testing frequency plus additional to accommodate PRN testing needs. Dispense all needed supplies to include: monitor, strips, lancing device, lancets, control solutions, alcohol swabs. 1 kit 0    blood glucose monitor strips Test 2 times a day & as needed for symptoms of irregular blood glucose. Dispense sufficient amount for indicated testing frequency plus additional to accommodate PRN testing needs. 100 strip 0    FreeStyle Lancets MISC 1 each by Does not apply route daily 100 each 3    aspirin 81 MG tablet Take 81 mg by mouth daily      acetaminophen (TYLENOL) 325 MG tablet Take 2 tablets by mouth every 4 hours as needed for Pain or Fever 120 tablet 3     No current facility-administered medications on file prior to encounter. REVIEW OF SYSTEMS    Pertinent items are noted in HPI. Constitutional: Negative for systemic symptoms including fever, chills and malaise. Objective:      BP (!) 136/90   Pulse 103   Temp 97.8 °F (36.6 °C)   Resp 18     PHYSICAL EXAM  General: The patient is in no acute distress. Mental status:  Patient is appropriate, is  oriented to place and plan of care.   Dermatologic exam: Visual inspection of the periwound reveals the skin to be coarse  Wound exam: see wound description below in procedure note      Assessment:     Problem List Items Addressed This Visit     Diabetic ulcer of right midfoot associated with type 2 diabetes mellitus, with fat Wound Assessment Guadalupe Guerra 08/13/20 1630   Drainage Amount Moderate 08/13/20 1630   Drainage Description Serosanguinous 08/13/20 1630   Odor None 08/13/20 1630   Margins Defined edges 08/13/20 1630   Rosalie-wound Assessment Maceration 08/13/20 1630   Non-staged Wound Description Full thickness 08/13/20 1630   Guadalupe Guerra%Wound Bed 100 08/13/20 1630   Red%Wound Bed 0 08/13/20 1630   Yellow%Wound Bed 0 08/13/20 1630   Black%Wound Bed 0 08/13/20 1630   Purple%Wound Bed 0 08/13/20 1630   Other%Wound Bed 0 08/13/20 1630   Number of days: 3       Percent of Wound(s) Debrided: approximately 100%    Total  Area  Debrided:  0.56 sq cm     Bleeding:  Minimal    Hemostasis Achieved:  by pressure    Procedural Pain:  0  / 10     Post Procedural Pain:  0 / 10     Response to treatment:  Well tolerated by patient. Status of wound progress and description from last visit:  Improving. With the indications of casting being present and no contraindications, information and  instructions were given to the patient and the patient consented to the treatment. After proper wound care and appropriate padding. A total contact cast was applied according to protocol. The patient tolerated the procedure well. Plan:       Discharge Instructions             PHYSICIAN ORDERS AND DISCHARGE INSTRUCTIONS     NOTE: Upon discharge from the [address], you will receive a patient experience survey. We would be grateful if you would take the time to fill this survey out.     Wound care order history:                 YADY's   Right       Left               Date:              Cultures:                Grafts:                Antibiotics:           Continuing wound care orders and information:                 Residence:                Continue home health care with:               Your wound-care supplies will be provided by:      Wound cleansing:               Do not scrub or use excessive force.               Wash hands with soap and water before and after dressing changes. Prior to applying a clean dressing, cleanse wound with normal saline, wound cleanser, or mild soap and water. Ask the physician or nurse before getting the wound(s) wet in a shower     Daily Wound management:              Keep weight off wounds and reposition every 2 hours. Avoid standing for long periods of time. Apply wraps/stockings in AM and remove at bedtime. If swelling is present, elevate legs to the level of the heart or above for 30 minutes 4-5 times a day and/or when sitting. When taking antibiotics take entire prescription as ordered by physician do not stop taking until medicine is all gone.                                                     Orders for this week:  08/13/2020                Right Plantar Foot-- Clean with soap and water, pat dry. Apply A&D to leg. Apply ariadna to wound bed, cover with drawtex and Cutifoam. TCC. Change on Thursday      Follow up with [facility] CNP on Tuesday  Call [phone] for any questions or concerns.   Date__________   Time____________          Treatment Note Wound 08/11/20 Right;Plantar #1 right plantar-Dressing/Treatment: (ariadna;drawtex; foam; tcc)    Written Patient Dismissal Instructions Given            Electronically signed by Charls Gowers, APRN - CNP on 8/14/2020 at 1:11 PM

## 2020-08-18 ENCOUNTER — HOSPITAL ENCOUNTER (OUTPATIENT)
Dept: WOUND CARE | Age: 40
Discharge: HOME | End: 2020-08-18
Payer: COMMERCIAL

## 2020-08-18 VITALS
SYSTOLIC BLOOD PRESSURE: 151 MMHG | RESPIRATION RATE: 18 BRPM | HEART RATE: 89 BPM | DIASTOLIC BLOOD PRESSURE: 93 MMHG | TEMPERATURE: 97.8 F

## 2020-08-18 PROCEDURE — 11042 DBRDMT SUBQ TIS 1ST 20SQCM/<: CPT | Performed by: NURSE PRACTITIONER

## 2020-08-18 PROCEDURE — 29445 APPL RIGID TOT CNTC LEG CAST: CPT

## 2020-08-18 NOTE — PROGRESS NOTES
Progress Note and application of total contact cast      Stefan Johnson  AGE: 44 y.o. GENDER: male  : 1980  EPISODE DATE:  2020     Subjective:     Chief Complaint   Patient presents with    Wound Check     right foot          HISTORY of PRESENT ILLNESS      Stefan Johnson is a 44 y.o. male who presents today for wound evaluation of Chronic diabetic and pressure ulcer(s) of right midfoot. The ulcer is of moderate severity. The underlying cause of the wound is diabetes and pressure. The patient is being considered as a candidate for debridement and use of a total contact cast if indicated.   Wound Pain Timing/Severity: waxing and waning  Quality of pain: aching, tender  Severity of pain:  2 / 10   Modifying Factors: diabetes, chronic pressure and shear force  Associated Signs/Symptoms: pain        PAST MEDICAL HISTORY        Diagnosis Date    Callus of foot     Right foot - see's Dr. Luis Loja Dizziness     positional    Essential hypertension     Follows with PCP    Lumbar radiculopathy        PAST SURGICAL HISTORY    Past Surgical History:   Procedure Laterality Date    [facility]    DENTAL SURGERY      teeth extractions -half per patient    HERNIA REPAIR Bilateral 2020    BIALTERAL HERNIA INGUINAL REPAIR performed by Rayne Tuttle MD at [address] [place] [address]      [place] OFFICE/OUTPT VISIT,PROCEDURE ONLY Left 2018    L5-S1 HEMILAMINECTOMY, REMOVAL OF DISC LEFT SIDE performed by Jordana Bradley MD at [address] [place] [address] Road HISTORY    Family History   Problem Relation Age of Onset    Heart Disease Father     Diabetes Father     Diabetes Mother        SOCIAL HISTORY    Social History     Tobacco Use    Smoking status: Never Smoker    Smokeless tobacco: Never Used   Substance Use Topics    Alcohol use: Yes     Comment: \"couple beers a night\"    Drug use: No       ALLERGIES    No Known Problem List Items Addressed This Visit     Diabetic ulcer of right midfoot associated with type 2 diabetes mellitus, with fat layer exposed ([facility] [address] - Primary          Procedure: The wound bed and lower extremity was cleansed and prepared as necessary for debridement and casting. Debridement was required. Consent obtained: Yes    Time out taken: Yes    Using curette sharp Excisional Debridement of the wound(s) was/were performed down through and including the removal of subcutaneous tissue. Devitalized Tissue Debrided:  slough and exudate    Pre Debridement Measurements:  Are located in the Wound Documentation Flow Sheet    All active wounds listed below with today's date are evaluated  Wound(s)    debrided this date include # : 1     Post  Debridement Measurements:  Wound 08/11/20 Right;Plantar #1 right plantar (Active)   Wound Image   08/11/20 1052   Wound Diabetic Mathias 2 08/18/20 1143   Dressing Status Clean;Dry; Intact 08/18/20 1238   Dressing Changed Changed/New 08/18/20 1238   Wound Cleansed Soap and water 08/18/20 1143   Wound Length (cm) 0.3 cm 08/18/20 1143   Wound Width (cm) 0.2 cm 08/18/20 1143   Wound Depth (cm) 0.1 cm 08/18/20 1143   Wound Surface Area (cm^2) 0.06 cm^2 08/18/20 1143   Change in Wound Size % (l*w) 93.33 08/18/20 1143   Wound Volume (cm^3) 0.01 cm^3 08/18/20 1143   Wound Healing % 94 08/18/20 1143   Post-Procedure Length (cm) 0.3 cm 08/18/20 1157   Post-Procedure Width (cm) 0.2 cm 08/18/20 1157   Post-Procedure Depth (cm) 0.1 cm 08/18/20 1157   Post-Procedure Surface Area (cm^2) 0.06 cm^2 08/18/20 1157   Post-Procedure Volume (cm^3) 0.01 cm^3 08/18/20 1157   Distance Tunneling (cm) 0 cm 08/18/20 1143   Tunneling Position ___ O'Clock 0 08/18/20 1143   Undermining Starts ___ O'Clock 0 08/18/20 1143   Undermining Ends___ O'Clock 0 08/18/20 1143   Undermining Maxium Distance (cm) 0 08/18/20 1143   Wound Assessment [place] 08/18/20 1143   Drainage Amount Moderate 08/18/20 1143

## 2020-08-24 ENCOUNTER — HOSPITAL ENCOUNTER (OUTPATIENT)
Dept: LAB | Age: 40
Discharge: HOME | End: 2020-08-24
Payer: COMMERCIAL

## 2020-08-24 PROCEDURE — U0002 COVID-19 LAB TEST NON-CDC: HCPCS

## 2020-08-24 PROCEDURE — C9803 HOPD COVID-19 SPEC COLLECT: HCPCS

## 2020-08-25 LAB
SARS-COV-2: NOT DETECTED
SOURCE: NORMAL

## 2020-08-27 ENCOUNTER — HOSPITAL ENCOUNTER (OUTPATIENT)
Dept: WOUND CARE | Age: 40
Discharge: HOME | End: 2020-08-27
Payer: COMMERCIAL

## 2020-08-27 NOTE — FLOWSHEET NOTE
WOUND CARE  Cancellation/No-show Note  Patient Name:  Tyron Nolen  :  1980   Date:  2020  Cancelled visits to date: 0  No-shows to date: 0    For today's appointment patient:  [x]  Cancelled  []  Rescheduled appointment  []  No-show     Reason given by patient:  [x]  Patient ill  []  Conflicting appointment  []  No transportation    []  Conflict with work  []  No reason given  []  Other:     Comments:      Electronically signed by:  Sisi Dawkins, 2020, 8:47 AM

## 2020-09-01 ENCOUNTER — HOSPITAL ENCOUNTER (OUTPATIENT)
Age: 40
Setting detail: SPECIMEN
Discharge: HOME | End: 2020-09-01
Payer: COMMERCIAL

## 2020-09-01 PROCEDURE — U0002 COVID-19 LAB TEST NON-CDC: HCPCS

## 2020-09-02 LAB
SARS-COV-2: NOT DETECTED
SOURCE: NORMAL

## 2020-09-08 ENCOUNTER — HOSPITAL ENCOUNTER (OUTPATIENT)
Dept: WOUND CARE | Age: 40
Discharge: HOME | End: 2020-09-08
Payer: COMMERCIAL

## 2020-09-08 VITALS
SYSTOLIC BLOOD PRESSURE: 151 MMHG | DIASTOLIC BLOOD PRESSURE: 83 MMHG | RESPIRATION RATE: 16 BRPM | HEART RATE: 95 BPM | TEMPERATURE: 98 F

## 2020-09-08 PROCEDURE — 29445 APPL RIGID TOT CNTC LEG CAST: CPT

## 2020-09-08 PROCEDURE — 29445 APPL RIGID TOT CNTC LEG CAST: CPT | Performed by: NURSE PRACTITIONER

## 2020-09-08 NOTE — PROGRESS NOTES
Wound Care Center Progress Note       Fortino Bergeron  AGE: 44 y.o. GENDER: male  : 1980  TODAY'S DATE:  2020        Subjective:     Chief Complaint   Patient presents with    Wound Check     right foot         HISTORY of PRESENT ILLNESS     Fortino Bergeron is a 44 y.o. male who presents today for wound evaluation of Chronic diabetic and pressure ulcer(s) of the right midfoot. The ulcer is of moderate severity. The underlying cause of the wound is diabetes and pressure. The patient is being considered as a candidate for debridement and use of a total contact cast if indicated.   Wound Pain Timing/Severity: waxing and waning  Quality of pain: tender  Severity of pain:  1 / 10   Modifying Factors: diabetes, chronic pressure and shear force  Associated Signs/Symptoms: pain        PAST MEDICAL HISTORY        Diagnosis Date    Callus of foot     Right foot - see's Dr. Connie Heller Dizziness     positional    Essential hypertension     Follows with PCP    Lumbar radiculopathy        PAST SURGICAL HISTORY    Past Surgical History:   Procedure Laterality Date    CARDIAC CATHETERIZATION  2018    Larance Najjar    DENTAL SURGERY      teeth extractions -half per patient    HERNIA REPAIR Bilateral 2020    BIALTERAL HERNIA INGUINAL REPAIR performed by Tristen [facility]za [facility] at [facility] 2018    [place] OFFICE/OUTPT VISIT,PROCEDURE ONLY Left 2018    L5-S1 HEMILAMINECTOMY, REMOVAL OF DISC LEFT SIDE performed by Matty Haile MD at [address] [place] [address] Road HISTORY    Family History   Problem Relation Age of Onset    Heart Disease Father     Diabetes Father     Diabetes Mother        SOCIAL HISTORY    Social History     Tobacco Use    Smoking status: Never Smoker    Smokeless tobacco: Never Used   Substance Use Topics    Alcohol use: Yes     Comment: \"couple beers a night\"    Drug use: No       ALLERGIES    No Known Allergies    MEDICATIONS    Current Outpatient Medications on File Prior to Encounter   Medication Sig Dispense Refill    omeprazole (PRILOSEC) 20 MG delayed release capsule TAKE 1 CAPSULE BY MOUTH ONCE DAILY IN THE MORNING 90 capsule 1    lisinopril-hydroCHLOROthiazide (PRINZIDE;ZESTORETIC) 20-12.5 MG per tablet TAKE 1 TABLET BY MOUTH ONCE DAILY 90 tablet 1    metFORMIN (GLUCOPHAGE) 500 MG tablet Take 2 tablets by mouth 2 times daily (with meals) 360 tablet 1    glyBURIDE (DIABETA) 5 MG tablet Take 2 tablets by mouth 2 times daily (with meals) 360 tablet 1    blood glucose monitor kit and supplies Dispense sufficient amount for indicated testing frequency plus additional to accommodate PRN testing needs. Dispense all needed supplies to include: monitor, strips, lancing device, lancets, control solutions, alcohol swabs. 1 kit 0    blood glucose monitor strips Test 2 times a day & as needed for symptoms of irregular blood glucose. Dispense sufficient amount for indicated testing frequency plus additional to accommodate PRN testing needs. 100 strip 0    FreeStyle Lancets MISC 1 each by Does not apply route daily 100 each 3    aspirin 81 MG tablet Take 81 mg by mouth daily      acetaminophen (TYLENOL) 325 MG tablet Take 2 tablets by mouth every 4 hours as needed for Pain or Fever 120 tablet 3     No current facility-administered medications on file prior to encounter. REVIEW OF SYSTEMS    Pertinent items are noted in HPI. Constitutional: Negative for systemic symptoms including fever, chills and malaise. Objective:      BP (!) 151/83   Pulse 95   Temp 98 °F (36.7 °C) (Temporal)   Resp 16     PHYSICAL EXAM    General: The patient is in no acute distress. Mental status:  Patient is appropriate, is  oriented to place and plan of care. Dermatologic exam: Visual inspection of the periwound reveals the skin to be coarse.   Wound exam:  see wound description below     All active wounds listed below with today's date are evaluated    Wound 08/11/20 Right;Plantar #1 right plantar (Active)   Wound Image   08/11/20 1052   Wound Diabetic Mathias 2 09/08/20 1547   Dressing Status Clean;Dry; Intact 08/18/20 1238   Dressing Changed Changed/New 08/18/20 1238   Wound Cleansed Soap and water 08/18/20 1143   Wound Length (cm) 0.4 cm 09/08/20 1547   Wound Width (cm) 0.1 cm 09/08/20 1547   Wound Depth (cm) 0.1 cm 09/08/20 1547   Wound Surface Area (cm^2) 0.04 cm^2 09/08/20 1547   Change in Wound Size % (l*w) 95.56 09/08/20 1547   Wound Volume (cm^3) 0 cm^3 09/08/20 1547   Wound Healing % 100 09/08/20 1547   Post-Procedure Length (cm) 0.3 cm 08/18/20 1157   Post-Procedure Width (cm) 0.2 cm 08/18/20 1157   Post-Procedure Depth (cm) 0.1 cm 08/18/20 1157   Post-Procedure Surface Area (cm^2) 0.06 cm^2 08/18/20 1157   Post-Procedure Volume (cm^3) 0.01 cm^3 08/18/20 1157   Distance Tunneling (cm) 0 cm 09/08/20 1547   Tunneling Position ___ O'Clock 0 09/08/20 1547   Undermining Starts ___ O'Clock 0 09/08/20 1547   Undermining Ends___ O'Clock 0 09/08/20 1547   Undermining Maxium Distance (cm) 0 09/08/20 1547   Wound Assessment Pink 09/08/20 1547   Drainage Amount Scant 09/08/20 1547   Drainage Description Serosanguinous 09/08/20 1547   Odor None 09/08/20 1547   Margins Defined edges 09/08/20 1547   Rosalie-wound Assessment Calloused 09/08/20 1547   Non-staged Wound Description Full thickness 09/08/20 1547   Faith%Wound Bed 100 09/08/20 1547   Red%Wound Bed 0 09/08/20 1547   Yellow%Wound Bed 0 09/08/20 1547   Black%Wound Bed 0 09/08/20 1547   Purple%Wound Bed 0 09/08/20 1547   Other%Wound Bed 0 09/08/20 1547   Number of days: 28       Assessment:       Problem List Items Addressed This Visit     Diabetic ulcer of right midfoot associated with type 2 diabetes mellitus, with fat layer exposed ([facility] [address] - Primary        Procedure:     The wound bed and lower extremity was cleansed and prepared as necessary for casting.     Debridement was not required.     Application of cast:     With the indications of casting being present and no contraindications, information and  instructions were given to the patient and the patient consented to the treatment. After proper wound care and appropriate padding. A total contact cast was applied according to protocol.      Fall prevention safety and instructions on using various supportive walking devices were discussed with the patient. The patient was also instructed on what to do and how to get help if the cast became uncomfortable. The patient expressed understanding of all of these issues.     The patient tolerated the procedure well.     Status of wound progress and description from last visit: Improved. The patient was seen by Dr. Kristan Matthew today and wounds debrided there. TCC applied without difficulty. Plan:     Discharge Instructions       PHYSICIAN ORDERS AND DISCHARGE INSTRUCTIONS     NOTE: Upon discharge from the [address], you will receive a patient experience survey. We would be grateful if you would take the time to fill this survey out.     Wound care order history:                 YADY's   Right       Left               Date:              Cultures:                Grafts:                Antibiotics:           Continuing wound care orders and information:                 Residence:                Continue home health care with:               Your wound-care supplies will be provided by:      Wound cleansing:               LN not scrub or use excessive force.              Wash hands with soap and water before and after dressing changes.               SHQEP to applying a clean dressing, cleanse wound with normal saline, wound cleanser, or mild soap and water.               Ask the physician or nurse before getting the wound(s) wet in a shower     Daily Wound management:              Keep weight off wounds and reposition every 2 hours.              Avoid standing for long periods of time.              QBMQJ wraps/stockings in AM and remove at bedtime.              If swelling is present, elevate legs to the level of the heart or above for 30 minutes 4-5 times a day and/or when sitting.                                      When taking antibiotics take entire prescription as ordered by physician do not stop taking until medicine is all gone.                                                     Orders for this week:  09/08/2020                   Right Plantar Foot-- Clean with soap and water, pat dry. Apply A&D to leg. Apply ariadna to wound bed, cover with Ca Alg and Cutifoam. TCC.  Change in 1 week        Follow up with Malorie Yao in 1 week in wound clinic,  Call [phone] for any questions or concerns.   Date__________   Time____________        Treatment Note      Written Patient Dismissal Instructions Given            Electronically signed by RAFAEL Hyatt CNP on 9/8/2020 at 4:35 PM

## 2020-09-15 ENCOUNTER — HOSPITAL ENCOUNTER (OUTPATIENT)
Dept: WOUND CARE | Age: 40
Discharge: HOME | End: 2020-09-15
Payer: COMMERCIAL

## 2020-09-15 VITALS
SYSTOLIC BLOOD PRESSURE: 140 MMHG | RESPIRATION RATE: 16 BRPM | TEMPERATURE: 98 F | HEART RATE: 96 BPM | DIASTOLIC BLOOD PRESSURE: 84 MMHG

## 2020-09-15 PROCEDURE — 11042 DBRDMT SUBQ TIS 1ST 20SQCM/<: CPT | Performed by: NURSE PRACTITIONER

## 2020-09-15 PROCEDURE — 11042 DBRDMT SUBQ TIS 1ST 20SQCM/<: CPT

## 2020-09-15 NOTE — PROGRESS NOTES
Progress Note and application of total contact cast      Yosef Ratliff  AGE: 44 y.o. GENDER: male  : 1980  EPISODE DATE:  9/15/2020     Subjective:     Chief Complaint   Patient presents with    Wound Check     right foot          HISTORY of PRESENT ILLNESS      Yosef Ratliff is a 44 y.o. male who presents today for wound evaluation of Chronic diabetic and pressure ulcer(s) of right midfoot. The ulcer is of moderate severity. The underlying cause of the wound is diabetes and pressure. The patient is being considered as a candidate for debridement and use of a total contact cast if indicated.   Wound Pain Timing/Severity: waxing and waning  Quality of pain: tender  Severity of pain:  1 / 10   Modifying Factors: diabetes, chronic pressure and shear force  Associated Signs/Symptoms: pain        PAST MEDICAL HISTORY        Diagnosis Date    Callus of foot     Right foot - see's Dr. Delacruz So Dizziness     positional    Essential hypertension     Follows with PCP    Lumbar radiculopathy        PAST SURGICAL HISTORY    Past Surgical History:   Procedure Laterality Date    [facility]    DENTAL SURGERY      teeth extractions -half per patient    HERNIA REPAIR Bilateral 2020    BIALTERAL HERNIA INGUINAL REPAIR performed by Twyla Jeter MD at [address] [place] [address]      [place] OFFICE/OUTPT VISIT,PROCEDURE ONLY Left 2018    L5-S1 HEMILAMINECTOMY, REMOVAL OF DISC LEFT SIDE performed by Viviana Coronado MD at [address] [place] [address] Road HISTORY    Family History   Problem Relation Age of Onset    Heart Disease Father     Diabetes Father     Diabetes Mother        SOCIAL HISTORY    Social History     Tobacco Use    Smoking status: Never Smoker    Smokeless tobacco: Never Used   Substance Use Topics    Alcohol use: Yes     Comment: \"couple beers a night\"    Drug use: No       ALLERGIES    No Known Allergies    MEDICATIONS    Current Outpatient Medications on File Prior to Encounter   Medication Sig Dispense Refill    omeprazole (PRILOSEC) 20 MG delayed release capsule TAKE 1 CAPSULE BY MOUTH ONCE DAILY IN THE MORNING 90 capsule 1    lisinopril-hydroCHLOROthiazide (PRINZIDE;ZESTORETIC) 20-12.5 MG per tablet TAKE 1 TABLET BY MOUTH ONCE DAILY 90 tablet 1    metFORMIN (GLUCOPHAGE) 500 MG tablet Take 2 tablets by mouth 2 times daily (with meals) 360 tablet 1    glyBURIDE (DIABETA) 5 MG tablet Take 2 tablets by mouth 2 times daily (with meals) 360 tablet 1    blood glucose monitor kit and supplies Dispense sufficient amount for indicated testing frequency plus additional to accommodate PRN testing needs. Dispense all needed supplies to include: monitor, strips, lancing device, lancets, control solutions, alcohol swabs. 1 kit 0    blood glucose monitor strips Test 2 times a day & as needed for symptoms of irregular blood glucose. Dispense sufficient amount for indicated testing frequency plus additional to accommodate PRN testing needs. 100 strip 0    FreeStyle Lancets MISC 1 each by Does not apply route daily 100 each 3    aspirin 81 MG tablet Take 81 mg by mouth daily      acetaminophen (TYLENOL) 325 MG tablet Take 2 tablets by mouth every 4 hours as needed for Pain or Fever 120 tablet 3     No current facility-administered medications on file prior to encounter. REVIEW OF SYSTEMS    Pertinent items are noted in HPI. Constitutional: Negative for systemic symptoms including fever, chills and malaise. Objective:      BP (!) 140/84   Pulse 96   Temp 98 °F (36.7 °C) (Temporal)   Resp 16     PHYSICAL EXAM    General: The patient is in no acute distress. Mental status:  Patient is appropriate, is  oriented to place and plan of care.   Dermatologic exam: Visual inspection of the periwound reveals the skin to be coarse  Wound exam: see wound description below in procedure note      Assessment: Problem List Items Addressed This Visit     Diabetic ulcer of right midfoot associated with type 2 diabetes mellitus, with fat layer exposed ([facility] [address] - Primary          Procedure: The wound bed and lower extremity was cleansed and prepared as necessary for debridement and casting. Debridement was required. Consent obtained: Yes    Time out taken: Yes    Using curette sharp Excisional Debridement of the wound(s) was/were performed down through and including the removal of subcutaneous tissue. Devitalized Tissue Debrided:  slough and exudate    Pre Debridement Measurements:  Are located in the Wound Documentation Flow Sheet    All active wounds listed below with today's date are evaluated  Wound(s)    debrided this date include # : 1     Post  Debridement Measurements:      Wound 08/11/20 Right;Plantar #1 right plantar (Active)   Wound Image   08/11/20 1052   Wound Diabetic Mathias 2 09/15/20 1316   Dressing Status Clean;Dry; Intact 09/15/20 1346   Dressing Changed Changed/New 09/15/20 1346   Wound Cleansed Soap and water 08/18/20 1143   Wound Length (cm) 0 cm 09/15/20 1316   Wound Width (cm) 0 cm 09/15/20 1316   Wound Depth (cm) 0 cm 09/15/20 1316   Wound Surface Area (cm^2) 0 cm^2 09/15/20 1316   Change in Wound Size % (l*w) 100 09/15/20 1316   Wound Volume (cm^3) 0 cm^3 09/15/20 1316   Wound Healing % 100 09/15/20 1316   Post-Procedure Length (cm) 0.1 cm 09/15/20 1332   Post-Procedure Width (cm) 0.1 cm 09/15/20 1332   Post-Procedure Depth (cm) 0.1 cm 09/15/20 1332   Post-Procedure Surface Area (cm^2) 0.01 cm^2 09/15/20 1332   Post-Procedure Volume (cm^3) 0 cm^3 09/15/20 1332   Distance Tunneling (cm) 0 cm 09/08/20 1547   Tunneling Position ___ O'Clock 0 09/08/20 1547   Undermining Starts ___ O'Clock 0 09/08/20 1547   Undermining Ends___ O'Clock 0 09/08/20 1547   Undermining Maxium Distance (cm) 0 09/08/20 1547   Wound Assessment Pink 09/08/20 1547   Drainage Amount None 09/15/20 1316   Drainage Description Serosanguinous 09/08/20 1547   Odor None 09/15/20 1316   Margins Defined edges 09/08/20 1547   Rosalie-wound Assessment Calloused 09/15/20 1316   Non-staged Wound Description Full thickness 09/08/20 1547   Eldon%Wound Bed 100 09/15/20 1316   Red%Wound Bed 0 09/15/20 1316   Yellow%Wound Bed 0 09/15/20 1316   Black%Wound Bed 0 09/15/20 1316   Purple%Wound Bed 0 09/15/20 1316   Other%Wound Bed 0 09/15/20 1316   Number of days: 35       Percent of Wound(s) Debrided: approximately 100%    Total  Area  Debrided[de-identified]  0.1 sq cm       Bleeding:  Minimal    Hemostasis Achieved:  by pressure    Procedural Pain:  0  / 10     Post Procedural Pain:  0 / 10     Application of cast:    With the indications of casting being present and no contraindications, information and  instructions were given to the patient and the patient consented to the treatment. After proper wound care and appropriate padding. A total contact cast was applied according to protocol. Fall prevention safety and instructions on using various supportive walking devices were discussed with the patient. The patient was also instructed on what to do and how to get help if the cast became uncomfortable. The patient expressed understanding of all of these issues. The patient tolerated the procedure well. Status of wound progress and description from last visit:   Improved, almost healed. The patient will see Dr. Alistair Kilgore next week for cast removal.    Plan:       Discharge Instructions       PHYSICIAN ORDERS AND DISCHARGE INSTRUCTIONS     NOTE: Upon discharge from the [address], you will receive a patient experience survey.  We would be grateful if you would take the time to fill this survey out.     Wound care order history:                 YADY's   Right       Left               Date:              Cultures:                Grafts:                Antibiotics:           Continuing wound care orders and information:                 Residence:

## 2020-09-15 NOTE — PROGRESS NOTES
Contact Cast    NAME:  Bhavna Palmer  YOB: 1980  MEDICAL RECORD NUMBER:  [id number]  DATE:  9/15/2020    Goal:  Patient will maintain integrity of cast, avoid mobility hazards, and report complications that may occur (foul odor, pain, numbness, cracked cast).  [x] Removed old contact cast if indicated and wash extremity with soap and water.  [x] Applied ordered dressing.    Applied per Angeline Martínez CNP     Applied to Right Lower extremity   [x] Allow cast to dry.  [x] Instructed patient to report to health care provider, including wound care center, any back pain, hip pain, or leg pain, numbness of toes, or any odor  coming from the cast.    [x] Instructed patient not to stick any foreign objects down into cast.    [x] Instructed patient to utilize assistive devices(crutches, cane or walker) as ordered    [x] Instructed patient to continue offloading as directed.      Applied cast per  Guidelines    Electronically signed by Rob Clements RN on 9/15/2020 at 1:46 PM

## 2020-09-22 ENCOUNTER — HOSPITAL ENCOUNTER (OUTPATIENT)
Dept: WOUND CARE | Age: 40
Discharge: HOME | End: 2020-09-22
Payer: COMMERCIAL

## 2020-09-22 VITALS
SYSTOLIC BLOOD PRESSURE: 147 MMHG | HEART RATE: 103 BPM | TEMPERATURE: 97.6 F | DIASTOLIC BLOOD PRESSURE: 87 MMHG | RESPIRATION RATE: 16 BRPM

## 2020-09-22 PROCEDURE — 11055 PARING/CUTG B9 HYPRKER LES 1: CPT

## 2020-09-22 PROCEDURE — 11055 PARING/CUTG B9 HYPRKER LES 1: CPT | Performed by: NURSE PRACTITIONER

## 2020-09-22 NOTE — PROGRESS NOTES
Wound Care Center Progress Note       Meir Espinoza  AGE: 44 y.o. GENDER: male  : 1980  TODAY'S DATE:  2020        Subjective:     Chief Complaint   Patient presents with    Wound Check     right foot          HISTORY of PRESENT ILLNESS    Meir Espinoza is a 44 y.o. male who presents today for wound evaluation of Chronic diabetic and pressure ulcer(s) of right midfoot.    The ulcer is now healed, no open areas.  The underlying cause of the wound is diabetes and pressure.     Wound Pain Timing/Severity: none  Quality of pain: 0/10  Severity of pain:  0 / 10   Modifying Factors: diabetes, chronic pressure and shear force  Associated Signs/Symptoms: none        PAST MEDICAL HISTORY        Diagnosis Date    Callus of foot     Right foot - see's Dr. Letty Blood Dizziness     positional    Essential hypertension     Follows with PCP    Lumbar radiculopathy        PAST SURGICAL HISTORY    Past Surgical History:   Procedure Laterality Date    [facility]  2018    [facility]    DENTAL SURGERY      teeth extractions -half per patient    HERNIA REPAIR Bilateral 2020    BIALTERAL HERNIA INGUINAL REPAIR performed by Anne Ruiz MD at [address] [place] [address]    [place] OFFICE/OUTPT VISIT,PROCEDURE ONLY Left 2018    L5-S1 HEMILAMINECTOMY, REMOVAL OF DISC LEFT SIDE performed by César Chadwick MD at [address] [place] [address]    Family History   Problem Relation Age of Onset    Heart Disease Father     Diabetes Father     Diabetes Mother        SOCIAL HISTORY    Social History     Tobacco Use    Smoking status: Never Smoker    Smokeless tobacco: Never Used   Substance Use Topics    Alcohol use: Yes     Comment: \"couple beers a night\"    Drug use: No       ALLERGIES    No Known Allergies    MEDICATIONS    Current Outpatient Medications on File Prior to Encounter   Medication Sig Dispense Refill    omeprazole (PRILOSEC) 20 MG delayed release capsule TAKE 1 CAPSULE BY MOUTH ONCE DAILY IN THE MORNING 90 capsule 1    lisinopril-hydroCHLOROthiazide (PRINZIDE;ZESTORETIC) 20-12.5 MG per tablet TAKE 1 TABLET BY MOUTH ONCE DAILY 90 tablet 1    metFORMIN (GLUCOPHAGE) 500 MG tablet Take 2 tablets by mouth 2 times daily (with meals) 360 tablet 1    glyBURIDE (DIABETA) 5 MG tablet Take 2 tablets by mouth 2 times daily (with meals) 360 tablet 1    blood glucose monitor kit and supplies Dispense sufficient amount for indicated testing frequency plus additional to accommodate PRN testing needs. Dispense all needed supplies to include: monitor, strips, lancing device, lancets, control solutions, alcohol swabs. 1 kit 0    blood glucose monitor strips Test 2 times a day & as needed for symptoms of irregular blood glucose. Dispense sufficient amount for indicated testing frequency plus additional to accommodate PRN testing needs. 100 strip 0    FreeStyle Lancets MISC 1 each by Does not apply route daily 100 each 3    aspirin 81 MG tablet Take 81 mg by mouth daily      acetaminophen (TYLENOL) 325 MG tablet Take 2 tablets by mouth every 4 hours as needed for Pain or Fever 120 tablet 3     No current facility-administered medications on file prior to encounter. REVIEW OF SYSTEMS    Pertinent items are noted in HPI. Constitutional: Negative for systemic symptoms including fever, chills and malaise. Objective:      BP (!) 147/87   Pulse 103   Temp 97.6 °F (36.4 °C) (Temporal)   Resp 16     PHYSICAL EXAM    General: The patient is in no acute distress. Mental status:  Patient is appropriate, is  oriented to place and plan of care. Dermatologic exam: Visual inspection of the periwound reveals the skin to be coarse.   Wound exam:  see wound description below     All active wounds listed below with today's date are evaluated    Assessment:     Problem List Items Addressed This Visit     Diabetic ulcer of right midfoot associated with type 2 diabetes mellitus, with fat layer exposed ([facility] [address] - Primary        Hyperkeratotic tissue noted right plantar foot, paring of callous completed using curette. No wound present within the callous. Medical necessity includes advanced atrophic changes to include decrease hair growth lower legs, thickening, discolored toenails and rubor. Last seen by Luis Mazariegos his PCP on 7/23/2020. Status of wound progress and description from last visit: Healed. The patient is to see Dr. Alistair Kilgore today and start the process for diabetic shoes. Will follow up in 2 weeks, if no problems will discharge at that time. Plan:     Discharge Instructions       PHYSICIAN ORDERS AND DISCHARGE INSTRUCTIONS     NOTE: Upon discharge from the [address], you will receive a patient experience survey. We would be grateful if you would take the time to fill this survey out.     Wound care order history:                 YADY's   Right       Left               Date:              Cultures:                Grafts:                Antibiotics:           Continuing wound care orders and information:                 Residence:                Continue home health care with:               Your wound-care supplies will be provided by:      Wound cleansing:               DC not scrub or use excessive force.              Wash hands with soap and water before and after dressing changes.             GEEUX to applying a clean dressing, cleanse wound with normal saline, wound cleanser, or mild soap and water.               Ask the physician or nurse before getting the wound(s) wet in a shower     Daily Wound management:              Keep weight off wounds and reposition every 2 hours.              Avoid standing for long periods of time.              Apply wraps/stockings in AM and remove at bedtime.              If swelling is present, elevate legs to the level of the heart or above for 30 minutes 4-5 times a day and/or when sitting.                                      IHZB taking antibiotics take entire prescription as ordered by physician do not stop taking until medicine is all gone.                                                     Orders for this week:  09/22/2020                   Right Plantar Foot-- Clean with soap and water, pat dry. Apply A&D to leg. Tubi E         Follow up with Malorie Yao in 2 week in wound clinic,  Call [phone] for any questions or concerns.   Date__________   Time____________        Treatment Note      Written Patient Dismissal Instructions Given            Electronically signed by RAFAEL Hyatt CNP on 9/22/2020 at 2:15 PM

## 2020-09-23 ENCOUNTER — OFFICE VISIT (OUTPATIENT)
Dept: FAMILY MEDICINE CLINIC | Age: 40
End: 2020-09-23
Payer: COMMERCIAL

## 2020-09-23 VITALS
HEART RATE: 88 BPM | BODY MASS INDEX: 24.52 KG/M2 | SYSTOLIC BLOOD PRESSURE: 110 MMHG | HEIGHT: 72 IN | DIASTOLIC BLOOD PRESSURE: 70 MMHG | WEIGHT: 181 LBS | TEMPERATURE: 97 F

## 2020-09-23 DIAGNOSIS — E11.40 TYPE 2 DIABETES MELLITUS WITH DIABETIC NEUROPATHY, WITHOUT LONG-TERM CURRENT USE OF INSULIN (HCC): ICD-10-CM

## 2020-09-23 PROCEDURE — 99214 OFFICE O/P EST MOD 30 MIN: CPT | Performed by: FAMILY MEDICINE

## 2020-09-23 PROCEDURE — 90471 IMMUNIZATION ADMIN: CPT | Performed by: FAMILY MEDICINE

## 2020-09-23 PROCEDURE — 90686 IIV4 VACC NO PRSV 0.5 ML IM: CPT | Performed by: FAMILY MEDICINE

## 2020-09-23 NOTE — PROGRESS NOTES
9/23/2020    Bhavna Palmer    Chief Complaint   Patient presents with    Other     2 mth f/u dm    Insomnia       HPI    Dalton Rutledge is a 44 y.o. male who presents today with follow-up. Patient admits that his sugars are highly variable. He states that his blood sugar can be as low as 115 but rate 75 points by noon. He states often the sugars around 200 at night. I have cared for his mother for a long time and note that there may have multiple dietary challenges. Patient is willing to get labs checked today. I did an inventory of his breakfast.  Patient states that occasionally have orange juice for breakfast and he volunteered raisin Bran. I suggested multiple better alternatives and wrote them down for him. Acknowledging that he wants to should only be there is a therapeutic maneuver for low sugars. Patient rarely eats lunch I asked him to consider nuts or protein. Dinner frequently is a frozen dinner. They rarely cut up vegetables other than salads. Patient is diabetic foot ulcer is considered healed. He was in a boot for weeks. Patient states that he will be applying for disability. Patient complains of insomnia. I noted the fact that because he is not working hard its difficulty to generate tiredness. Patient is not interested in medication at this time.     REVIEW OF SYSTEMS    Constitutional:  Denies fever, chills, weight loss or weakness  Eyes:  no photophobia or discharge  ENT:  no sore throat or ear pain  Cardiovascular:  Denies chest pain, palpitations or swelling  Respiratory:  Denies cough or shortness of breath  GI:  no abdominal pain, nausea, vomiting, or diarrhea  Musculoskeletal:  no back pain  Skin:  No rashes  Neurologic:  no headache, focal weakness, or sensory changes  Endocrine:  no polyuria or polydipsia      PAST MEDICAL HISTORY  Past Medical History:   Diagnosis Date    Callus of foot     Right foot - see's Dr. Indio Interiano Dizziness     positional    Essential hypertension     Follows with PCP    Lumbar radiculopathy        FAMILY HISTORY  Family History   Problem Relation Age of Onset    Heart Disease Father     Diabetes Father     Diabetes Mother        SOCIAL HISTORY  Social History     Socioeconomic History    Marital status: Single     Spouse name: Not on file    Number of children: Not on file    Years of education: Not on file    Highest education level: Not on file   Occupational History    Not on file   Social Needs    Financial resource strain: Not on file    Food insecurity     Worry: Not on file     Inability: Not on file    Transportation needs     Medical: Not on file     Non-medical: Not on file   Tobacco Use    Smoking status: Never Smoker    Smokeless tobacco: Never Used   Substance and Sexual Activity    Alcohol use: Yes     Comment: \"couple beers a night\"    Drug use: No    Sexual activity: Yes     Partners: Female   Lifestyle    Physical activity     Days per week: Not on file     Minutes per session: Not on file    Stress: Not on file   Relationships    Social connections     Talks on phone: Not on file     Gets together: Not on file     Attends Mandaeism service: Not on file     Active member of club or organization: Not on file     Attends meetings of clubs or organizations: Not on file     Relationship status: Not on file    Intimate partner violence     Fear of current or ex partner: Not on file     Emotionally abused: Not on file     Physically abused: Not on file     Forced sexual activity: Not on file   Other Topics Concern    Not on file   Social History Narrative    Not on file        SURGICAL HISTORY  Past Surgical History:   Procedure Laterality Date    CARDIAC CATHETERIZATION  03/2018    [facility] [address]      teeth extractions -half per patient    HERNIA REPAIR Bilateral 5/27/2020    [address] performed by John Boyle MD at [address] [place] [address]  2018    [place] OFFICE/OUTPT VISIT,PROCEDURE ONLY Left 4/17/2018    L5-S1 HEMILAMINECTOMY, REMOVAL OF DISC LEFT SIDE performed by Viviana Coronado MD at [address] [facility]  Current Outpatient Medications   Medication Sig Dispense Refill    omeprazole (PRILOSEC) 20 MG delayed release capsule TAKE 1 CAPSULE BY MOUTH ONCE DAILY IN THE MORNING 90 capsule 1    lisinopril-hydroCHLOROthiazide (PRINZIDE;ZESTORETIC) 20-12.5 MG per tablet TAKE 1 TABLET BY MOUTH ONCE DAILY 90 tablet 1    metFORMIN (GLUCOPHAGE) 500 MG tablet Take 2 tablets by mouth 2 times daily (with meals) 360 tablet 1    glyBURIDE (DIABETA) 5 MG tablet Take 2 tablets by mouth 2 times daily (with meals) 360 tablet 1    blood glucose monitor kit and supplies Dispense sufficient amount for indicated testing frequency plus additional to accommodate PRN testing needs. Dispense all needed supplies to include: monitor, strips, lancing device, lancets, control solutions, alcohol swabs. 1 kit 0    blood glucose monitor strips Test 2 times a day & as needed for symptoms of irregular blood glucose. Dispense sufficient amount for indicated testing frequency plus additional to accommodate PRN testing needs. 100 strip 0    FreeStyle Lancets MISC 1 each by Does not apply route daily 100 each 3    aspirin 81 MG tablet Take 81 mg by mouth daily      acetaminophen (TYLENOL) 325 MG tablet Take 2 tablets by mouth every 4 hours as needed for Pain or Fever 120 tablet 3     No current facility-administered medications for this visit.         ALLERGIES  No Known Allergies    PHYSICAL EXAM    /70   Pulse 88   Temp 97 °F (36.1 °C)   Ht 6' (1.829 m)   Wt 181 lb (82.1 kg)   BMI 24.55 kg/m²     Constitutional:  Well developed, well nourished  HEENT:  Normocephalic, atraumatic, bilateral external ears normal, oropharynx moist, nose normal  Neck:  Normal range of motion, no tenderness, supple  Lymphatic:  No lymphadenopathy noted  Cardiovascular:  Normal heart rate, S1S2 nl  Thorax & Lungs:  Normal breath sounds, no respiratory distress, no wheezing  Skin:  Warm, dry, no erythema, no rash  Back:  straight  Extremities:  No edema, no tenderness, no cyanosis  Musculoskeletal:  Good range of motion   Neurologic:  Alert & oriented X 3      ASSESSMENT & PLAN    1. Type 2 diabetes mellitus with diabetic neuropathy, without long-term current use of insulin ([address]  Long discussions were given about nutrition and written down and handed to patient. I asked him to put on his refrigerator for both him and his mother  - Microalbumin / Creatinine Urine Ratio; Future  - Hemoglobin A1C; Future    2. Diabetic ulcer of right midfoot associated with type 2 diabetes mellitus, with fat layer exposed ([address]  Improving if not potentially resolved. 3. Essential hypertension  Issue controlled. Continue meds. Refilled meds.     4. Needs flu shot  - Influenza, Quadv, 3 yrs and older, IM, PF, Prefill Syr or SDV, 0.5mL (Sarath Campo PF)           Electronically signed by Olivier Brambila MD on 9/23/2020

## 2020-09-24 LAB
ESTIMATED AVERAGE GLUCOSE: 203 MG/DL
HBA1C MFR BLD: 8.7 %

## 2020-09-29 ENCOUNTER — TELEPHONE (OUTPATIENT)
Dept: FAMILY MEDICINE CLINIC | Age: 40
End: 2020-09-29

## 2020-10-01 ENCOUNTER — TELEPHONE (OUTPATIENT)
Dept: FAMILY MEDICINE CLINIC | Age: 40
End: 2020-10-01

## 2020-10-01 RX ORDER — PIOGLITAZONEHYDROCHLORIDE 15 MG/1
15 TABLET ORAL DAILY
Qty: 30 TABLET | Refills: 5 | Status: SHIPPED | OUTPATIENT
Start: 2020-10-01 | End: 2022-01-24 | Stop reason: SDUPTHER

## 2020-10-06 ENCOUNTER — HOSPITAL ENCOUNTER (OUTPATIENT)
Dept: WOUND CARE | Age: 40
Discharge: HOME | End: 2020-10-06
Payer: COMMERCIAL

## 2020-10-06 VITALS
SYSTOLIC BLOOD PRESSURE: 128 MMHG | RESPIRATION RATE: 20 BRPM | DIASTOLIC BLOOD PRESSURE: 85 MMHG | HEART RATE: 106 BPM | TEMPERATURE: 97 F

## 2020-10-06 PROCEDURE — 11719 TRIM NAIL(S) ANY NUMBER: CPT

## 2020-10-06 PROCEDURE — 99212 OFFICE O/P EST SF 10 MIN: CPT

## 2020-10-06 PROCEDURE — 99213 OFFICE O/P EST LOW 20 MIN: CPT | Performed by: NURSE PRACTITIONER

## 2020-10-06 NOTE — PROGRESS NOTES
Wound Care Center Progress Note       Jasson Hull  AGE: 44 y.o. GENDER: male  : 1980  TODAY'S DATE:  10/6/2020        Subjective:     Chief Complaint   Patient presents with    Wound Check     rt foot         HISTORY of PRESENT ILLNESS    Oumou watts 44 y. o. male who presents today for wound evaluation of Chronic diabetic and pressure ulcer(s) of right midfoot.  The ulcer is now healed, no open areas.  The underlying cause of the wound is diabetes and pressure.      Wound Pain Timing/Severity: none  Quality of pain: 0/10  Severity of pain:  0 / 10   Modifying Factors: diabetes, chronic pressure and shear force  Associated Signs/Symptoms: none      PAST MEDICAL HISTORY        Diagnosis Date    Callus of foot     Right foot - see's Dr. Duong Hendrix Dizziness     positional    Essential hypertension     Follows with PCP    Lumbar radiculopathy        PAST SURGICAL HISTORY    Past Surgical History:   Procedure Laterality Date    [facility]  2018    [facility]    DENTAL SURGERY      teeth extractions -half per patient    HERNIA REPAIR Bilateral 2020    BIALTERAL HERNIA INGUINAL REPAIR performed by Bryan Campbell MD at [address] [place] [address]    [place] OFFICE/OUTPT VISIT,PROCEDURE ONLY Left 2018    L5-S1 HEMILAMINECTOMY, REMOVAL OF DISC LEFT SIDE performed by Dana Turner MD at [address] [place] [address]    Family History   Problem Relation Age of Onset    Heart Disease Father     Diabetes Father     Diabetes Mother        SOCIAL HISTORY    Social History     Tobacco Use    Smoking status: Never Smoker    Smokeless tobacco: Never Used   Substance Use Topics    Alcohol use: Yes     Comment: \"couple beers a night\"    Drug use: No       ALLERGIES    No Known Allergies    MEDICATIONS    Current Outpatient Medications on File Prior to Encounter   Medication Sig Dispense Refill    pioglitazone (ACTOS) 15 MG tablet Take 1 tablet by mouth daily 30 tablet 5    omeprazole (PRILOSEC) 20 MG delayed release capsule TAKE 1 CAPSULE BY MOUTH ONCE DAILY IN THE MORNING 90 capsule 1    lisinopril-hydroCHLOROthiazide (PRINZIDE;ZESTORETIC) 20-12.5 MG per tablet TAKE 1 TABLET BY MOUTH ONCE DAILY 90 tablet 1    metFORMIN (GLUCOPHAGE) 500 MG tablet Take 2 tablets by mouth 2 times daily (with meals) 360 tablet 1    glyBURIDE (DIABETA) 5 MG tablet Take 2 tablets by mouth 2 times daily (with meals) 360 tablet 1    aspirin 81 MG tablet Take 81 mg by mouth daily      blood glucose monitor kit and supplies Dispense sufficient amount for indicated testing frequency plus additional to accommodate PRN testing needs. Dispense all needed supplies to include: monitor, strips, lancing device, lancets, control solutions, alcohol swabs. 1 kit 0    blood glucose monitor strips Test 2 times a day & as needed for symptoms of irregular blood glucose. Dispense sufficient amount for indicated testing frequency plus additional to accommodate PRN testing needs. 100 strip 0    FreeStyle Lancets MISC 1 each by Does not apply route daily 100 each 3    acetaminophen (TYLENOL) 325 MG tablet Take 2 tablets by mouth every 4 hours as needed for Pain or Fever 120 tablet 3     No current facility-administered medications on file prior to encounter. REVIEW OF SYSTEMS    Pertinent items are noted in HPI. Constitutional: Negative for systemic symptoms including fever, chills and malaise. Objective:      /85   Pulse 106   Temp 97 °F (36.1 °C) (Temporal)   Resp 20     PHYSICAL EXAM    General: The patient is in no acute distress. Mental status:  Patient is appropriate, is  oriented to place and plan of care. Dermatologic exam: Visual inspection of the periwound reveals the skin to be coarse.   Wound exam:  see wound description below     All active wounds listed below with today's date are evaluated      Incision 04/17/18 Back (Active)   Number of days: 903       Wound 06/13/20 Tibial Right pt states reddened and rash like after a sunburn 6/11/2020 (Active)   Number of days: 115       Assessment:       Problem List Items Addressed This Visit     Diabetic ulcer of right midfoot associated with type 2 diabetes mellitus, with fat layer exposed ([address] - Primary          Status of wound progress and description from last visit: Healed. The patient will follow up with Dr. Hansa Garcia next week. Will discharge at this time. Plan:     Discharge Instructions       PHYSICIAN ORDERS AND DISCHARGE INSTRUCTIONS     NOTE: Upon discharge from the [address], you will receive a patient experience survey. We would be grateful if you would take the time to fill this survey out.     Wound care order history:                 YADY's   Right       Left               Date:              Cultures:                Grafts:                Antibiotics:           Continuing wound care orders and information:                 Residence:                Continue home health care with:               Your wound-care supplies will be provided by:      Wound cleansing:               FL not scrub or use excessive force.              Wash hands with soap and water before and after dressing changes.             AMZSG to applying a clean dressing, cleanse wound with normal saline, wound cleanser, or mild soap and water.               Ask the physician or nurse before getting the wound(s) wet in a shower     Daily Wound management:              Keep weight off wounds and reposition every 2 hours.              Avoid standing for long periods of time.              Apply wraps/stockings in AM and remove at bedtime.              If swelling is present, elevate legs to the level of the heart or above for 30 minutes 4-5 times a day and/or when sitting.                                      When taking antibiotics take entire prescription as ordered by physician do not stop taking until medicine is all gone.                                                     Orders for this week:  10/06/2020                   Right Plantar Foot-- Clean with soap and water, pat dry. Apply A&D to leg. Tubi E         Follow up with Dary Calhoun in 2 week in wound clinic,  Call [phone] for any questions or concerns.   Date__________   Time___________        Treatment Note      Written Patient Dismissal Instructions Given            Electronically signed by RAFAEL Sinclair CNP on 10/6/2020 at 5:08 PM

## 2020-12-02 LAB — DIABETIC RETINOPATHY: POSITIVE

## 2020-12-15 ENCOUNTER — HOSPITAL ENCOUNTER (OUTPATIENT)
Dept: WOUND CARE | Age: 40
Discharge: HOME | End: 2020-12-15
Payer: COMMERCIAL

## 2020-12-15 VITALS
BODY MASS INDEX: 23.7 KG/M2 | TEMPERATURE: 98 F | WEIGHT: 175 LBS | HEIGHT: 72 IN | DIASTOLIC BLOOD PRESSURE: 79 MMHG | RESPIRATION RATE: 20 BRPM | HEART RATE: 101 BPM | SYSTOLIC BLOOD PRESSURE: 125 MMHG

## 2020-12-15 PROCEDURE — 87075 CULTR BACTERIA EXCEPT BLOOD: CPT

## 2020-12-15 PROCEDURE — 87186 SC STD MICRODIL/AGAR DIL: CPT

## 2020-12-15 PROCEDURE — 99213 OFFICE O/P EST LOW 20 MIN: CPT

## 2020-12-15 PROCEDURE — 11042 DBRDMT SUBQ TIS 1ST 20SQCM/<: CPT | Performed by: NURSE PRACTITIONER

## 2020-12-15 PROCEDURE — 11042 DBRDMT SUBQ TIS 1ST 20SQCM/<: CPT

## 2020-12-15 PROCEDURE — 87070 CULTURE OTHR SPECIMN AEROBIC: CPT

## 2020-12-15 PROCEDURE — 87077 CULTURE AEROBIC IDENTIFY: CPT

## 2020-12-15 RX ORDER — LIDOCAINE 40 MG/G
CREAM TOPICAL ONCE
Status: CANCELLED | OUTPATIENT
Start: 2020-12-15 | End: 2020-12-15

## 2020-12-15 RX ORDER — GINSENG 100 MG
CAPSULE ORAL ONCE
Status: CANCELLED | OUTPATIENT
Start: 2020-12-15 | End: 2020-12-15

## 2020-12-15 RX ORDER — LIDOCAINE 50 MG/G
OINTMENT TOPICAL ONCE
Status: CANCELLED | OUTPATIENT
Start: 2020-12-15 | End: 2020-12-15

## 2020-12-15 RX ORDER — SULFAMETHOXAZOLE AND TRIMETHOPRIM 800; 160 MG/1; MG/1
1 TABLET ORAL 2 TIMES DAILY
Qty: 20 TABLET | Refills: 0 | Status: SHIPPED | OUTPATIENT
Start: 2020-12-15 | End: 2020-12-25

## 2020-12-15 RX ORDER — LIDOCAINE HYDROCHLORIDE 20 MG/ML
JELLY TOPICAL ONCE
Status: CANCELLED | OUTPATIENT
Start: 2020-12-15 | End: 2020-12-15

## 2020-12-15 RX ORDER — BACITRACIN ZINC AND POLYMYXIN B SULFATE 500; 1000 [USP'U]/G; [USP'U]/G
OINTMENT TOPICAL ONCE
Status: CANCELLED | OUTPATIENT
Start: 2020-12-15 | End: 2020-12-15

## 2020-12-15 RX ORDER — BACITRACIN, NEOMYCIN, POLYMYXIN B 400; 3.5; 5 [USP'U]/G; MG/G; [USP'U]/G
OINTMENT TOPICAL ONCE
Status: CANCELLED | OUTPATIENT
Start: 2020-12-15 | End: 2020-12-15

## 2020-12-15 RX ORDER — CLOBETASOL PROPIONATE 0.5 MG/G
OINTMENT TOPICAL ONCE
Status: CANCELLED | OUTPATIENT
Start: 2020-12-15 | End: 2020-12-15

## 2020-12-15 RX ORDER — BETAMETHASONE DIPROPIONATE 0.05 %
OINTMENT (GRAM) TOPICAL ONCE
Status: CANCELLED | OUTPATIENT
Start: 2020-12-15 | End: 2020-12-15

## 2020-12-15 RX ORDER — GENTAMICIN SULFATE 1 MG/G
OINTMENT TOPICAL ONCE
Status: CANCELLED | OUTPATIENT
Start: 2020-12-15 | End: 2020-12-15

## 2020-12-15 RX ORDER — LIDOCAINE HYDROCHLORIDE 40 MG/ML
SOLUTION TOPICAL ONCE
Status: CANCELLED | OUTPATIENT
Start: 2020-12-15 | End: 2020-12-15

## 2020-12-15 NOTE — PROGRESS NOTES
Wound Care Center Progress Note       Donnie Nicholas  AGE: 36 y.o. GENDER: male  : 1980  TODAY'S DATE:  12/15/2020        Subjective:     Chief Complaint   Patient presents with    Wound Check     RT FOOT         HISTORY of PRESENT ILLNESS    Tiffanie Solano a 44 y. o. male who presents today for wound evaluation of Chronic diabetic and pressure ulcer(s) of right midfoot.  The ulcer is now healed, no open areas.  The underlying cause of the wound is diabetes and pressure.      Wound Pain Timing/Severity: none  Quality of pain: 0/10  Severity of pain:  0 / 10   Modifying Factors: diabetes, chronic pressure and shear force  Associated Signs/Symptoms: none         PAST MEDICAL HISTORY        Diagnosis Date    Callus of foot     Right foot - see's Dr. Aminata Ibarra Dizziness     positional    Essential hypertension     Follows with PCP    Lumbar radiculopathy        PAST SURGICAL HISTORY    Past Surgical History:   Procedure Laterality Date    [facility]  2018    [facility]    DENTAL SURGERY      teeth extractions -half per patient    HERNIA REPAIR Bilateral 2020    BIALTERAL HERNIA INGUINAL REPAIR performed by Cade Treviño MD at [address] [place] [address]    [place] OFFICE/OUTPT VISIT,PROCEDURE ONLY Left 2018    L5-S1 HEMILAMINECTOMY, REMOVAL OF DISC LEFT SIDE performed by Patt Ruvalcaba MD at [address] [place] [address]    Family History   Problem Relation Age of Onset    Heart Disease Father     Diabetes Father     Diabetes Mother        SOCIAL HISTORY    Social History     Tobacco Use    Smoking status: Never Smoker    Smokeless tobacco: Never Used   Substance Use Topics    Alcohol use: Yes     Comment: \"couple beers a night\"    Drug use: No       ALLERGIES    No Known Allergies    MEDICATIONS    Current Outpatient Medications on File Prior to Encounter   Medication Sig Dispense Refill    omeprazole (PRILOSEC) 20 MG delayed release capsule TAKE 1 CAPSULE BY MOUTH ONCE DAILY IN THE MORNING 90 capsule 1    lisinopril-hydroCHLOROthiazide (PRINZIDE;ZESTORETIC) 20-12.5 MG per tablet TAKE 1 TABLET BY MOUTH ONCE DAILY 90 tablet 1    blood glucose monitor kit and supplies Dispense sufficient amount for indicated testing frequency plus additional to accommodate PRN testing needs. Dispense all needed supplies to include: monitor, strips, lancing device, lancets, control solutions, alcohol swabs. 1 kit 0    blood glucose monitor strips Test 2 times a day & as needed for symptoms of irregular blood glucose. Dispense sufficient amount for indicated testing frequency plus additional to accommodate PRN testing needs. 100 strip 0    FreeStyle Lancets MISC 1 each by Does not apply route daily 100 each 3    aspirin 81 MG tablet Take 81 mg by mouth daily      acetaminophen (TYLENOL) 325 MG tablet Take 2 tablets by mouth every 4 hours as needed for Pain or Fever 120 tablet 3    pioglitazone (ACTOS) 15 MG tablet Take 1 tablet by mouth daily 30 tablet 5    metFORMIN (GLUCOPHAGE) 500 MG tablet Take 2 tablets by mouth 2 times daily (with meals) 360 tablet 1    glyBURIDE (DIABETA) 5 MG tablet Take 2 tablets by mouth 2 times daily (with meals) 360 tablet 1     No current facility-administered medications on file prior to encounter. REVIEW OF SYSTEMS    Pertinent items are noted in HPI. Constitutional: Negative for systemic symptoms including fever, chills and malaise. Objective:      /79   Pulse 101   Temp 98 °F (36.7 °C) (Temporal)   Resp 20   Ht 6' (1.829 m)   Wt 175 lb (79.4 kg)   BMI 23.73 kg/m²     PHYSICAL EXAM      General: The patient is in no acute distress. Mental status:  Patient is appropriate, is  oriented to place and plan of care. Dermatologic exam: Visual inspection of the periwound reveals the skin to be dry, clammy, coarse and edematous.   Wound exam:  see wound description below     All active wounds listed below with today's date are evaluated      Incision 04/17/18 Back (Active)   Number of days: 973       Wound 06/13/20 Tibial Right pt states reddened and rash like after a sunburn 6/11/2020 (Active)   Number of days: 184       Wound 12/15/20 Right;Plantar # 1 (Active)   Wound Image   12/15/20 0943   Wound Cleansed Cleansed with saline 12/15/20 0943   Wound Length (cm) 0.9 cm 12/15/20 0943   Wound Width (cm) 0.9 cm 12/15/20 0943   Wound Depth (cm) 0.5 cm 12/15/20 0943   Wound Surface Area (cm^2) 0.81 cm^2 12/15/20 0943   Wound Volume (cm^3) 0.4 cm^3 12/15/20 0943   Post-Procedure Length (cm) 0.9 cm 12/15/20 1004   Post-Procedure Width (cm) 0.9 cm 12/15/20 1004   Post-Procedure Depth (cm) 0.5 cm 12/15/20 1004   Post-Procedure Surface Area (cm^2) 0.81 cm^2 12/15/20 1004   Post-Procedure Volume (cm^3) 0.4 cm^3 12/15/20 1004   Distance Tunneling (cm) 0 cm 12/15/20 0943   Tunneling Position ___ O'Clock 0 12/15/20 0943   Undermining Starts ___ O'Clock 0 12/15/20 0943   Undermining Ends___ O'Clock 0 12/15/20 0943   Undermining Maxium Distance (cm) 0 12/15/20 0943   Wound Assessment Pink/red;Slough 12/15/20 0943   Drainage Amount Moderate 12/15/20 0943   Drainage Description Serosanguinous 12/15/20 0943   Odor None 12/15/20 0943   Rosalie-wound Assessment Intact 12/15/20 0943   Margins Attached edges 12/15/20 0943   Wound Thickness Description not for Pressure Injury Full thickness 12/15/20 0943   Number of days: 0       Assessment:       Problem List Items Addressed This Visit     Diabetic ulcer of right midfoot associated with type 2 diabetes mellitus, with fat layer exposed ([address] - Primary            Procedure Note    Indications:  Based on my examination of this patient's wound(s) today, sharp excision into necrotic subcutaneous tissue is required to promote healing and evaluate the extent of previous healing.     Performed by: RAFAEL Carolina CNP    Consent obtained: Yes    Time out taken:  Yes    Pain Control: N/A       Debridement:Excisional Debridement    Using curette the wound(s) was/were sharply debrided down through and including the removal of subcutaneous tissue.         Devitalized Tissue Debrided:  fibrin, biofilm, slough and callus    Pre Debridement Measurements:  Are located in the Wound Documentation Flow Sheet    All active wounds listed below with today's date are evaluated  Wound(s)    debrided this date include # : 1     Post  Debridement Measurements:  Incision 04/17/18 Back (Active)   Number of days: 973       Wound 06/13/20 Tibial Right pt states reddened and rash like after a sunburn 6/11/2020 (Active)   Number of days: 184       Wound 12/15/20 Right;Plantar # 1 (Active)   Wound Image   12/15/20 0943   Wound Cleansed Cleansed with saline 12/15/20 0943   Wound Length (cm) 0.9 cm 12/15/20 0943   Wound Width (cm) 0.9 cm 12/15/20 0943   Wound Depth (cm) 0.5 cm 12/15/20 0943   Wound Surface Area (cm^2) 0.81 cm^2 12/15/20 0943   Wound Volume (cm^3) 0.4 cm^3 12/15/20 0943   Post-Procedure Length (cm) 0.9 cm 12/15/20 1004   Post-Procedure Width (cm) 0.9 cm 12/15/20 1004   Post-Procedure Depth (cm) 0.5 cm 12/15/20 1004   Post-Procedure Surface Area (cm^2) 0.81 cm^2 12/15/20 1004   Post-Procedure Volume (cm^3) 0.4 cm^3 12/15/20 1004   Distance Tunneling (cm) 0 cm 12/15/20 0943   Tunneling Position ___ O'Clock 0 12/15/20 0943   Undermining Starts ___ O'Clock 0 12/15/20 0943   Undermining Ends___ O'Clock 0 12/15/20 0943   Undermining Maxium Distance (cm) 0 12/15/20 0943   Wound Assessment Pink/red;Slough 12/15/20 0943   Drainage Amount Moderate 12/15/20 0943   Drainage Description Serosanguinous 12/15/20 0943   Odor None 12/15/20 0943   Rosalie-wound Assessment Intact 12/15/20 0943   Margins Attached edges 12/15/20 0943   Wound Thickness Description not for Pressure Injury Full thickness 12/15/20 0943   Number of days: 0       Percent of Wound(s) Debrided: approximately 100%    Total  Area  Debrided:  0.81

## 2020-12-21 LAB
CULTURE: ABNORMAL
Lab: ABNORMAL
SPECIMEN: ABNORMAL

## 2020-12-22 ENCOUNTER — HOSPITAL ENCOUNTER (OUTPATIENT)
Age: 40
Discharge: HOME | End: 2020-12-22
Payer: COMMERCIAL

## 2020-12-22 ENCOUNTER — HOSPITAL ENCOUNTER (OUTPATIENT)
Dept: GENERAL RADIOLOGY | Age: 40
Discharge: HOME | End: 2020-12-22
Payer: COMMERCIAL

## 2020-12-22 ENCOUNTER — HOSPITAL ENCOUNTER (OUTPATIENT)
Dept: WOUND CARE | Age: 40
Discharge: HOME | End: 2020-12-22
Payer: COMMERCIAL

## 2020-12-22 VITALS
DIASTOLIC BLOOD PRESSURE: 79 MMHG | HEART RATE: 90 BPM | RESPIRATION RATE: 18 BRPM | SYSTOLIC BLOOD PRESSURE: 153 MMHG | TEMPERATURE: 97.8 F

## 2020-12-22 PROBLEM — L03.119 CELLULITIS OF FOOT: Status: ACTIVE | Noted: 2020-12-22

## 2020-12-22 PROCEDURE — 73620 X-RAY EXAM OF FOOT: CPT

## 2020-12-22 PROCEDURE — 11042 DBRDMT SUBQ TIS 1ST 20SQCM/<: CPT

## 2020-12-22 PROCEDURE — 11056 PARNG/CUTG B9 HYPRKR LES 2-4: CPT

## 2020-12-22 PROCEDURE — 11042 DBRDMT SUBQ TIS 1ST 20SQCM/<: CPT | Performed by: NURSE PRACTITIONER

## 2020-12-22 RX ORDER — BACITRACIN, NEOMYCIN, POLYMYXIN B 400; 3.5; 5 [USP'U]/G; MG/G; [USP'U]/G
OINTMENT TOPICAL ONCE
Status: CANCELLED | OUTPATIENT
Start: 2020-12-22 | End: 2020-12-22

## 2020-12-22 RX ORDER — GENTAMICIN SULFATE 1 MG/G
OINTMENT TOPICAL ONCE
Status: CANCELLED | OUTPATIENT
Start: 2020-12-22 | End: 2020-12-22

## 2020-12-22 RX ORDER — LIDOCAINE HYDROCHLORIDE 20 MG/ML
JELLY TOPICAL ONCE
Status: CANCELLED | OUTPATIENT
Start: 2020-12-22 | End: 2020-12-22

## 2020-12-22 RX ORDER — LIDOCAINE HYDROCHLORIDE 40 MG/ML
SOLUTION TOPICAL ONCE
Status: CANCELLED | OUTPATIENT
Start: 2020-12-22 | End: 2020-12-22

## 2020-12-22 RX ORDER — SULFAMETHOXAZOLE AND TRIMETHOPRIM 800; 160 MG/1; MG/1
1 TABLET ORAL 2 TIMES DAILY
Qty: 20 TABLET | Refills: 0 | Status: ON HOLD | OUTPATIENT
Start: 2020-12-26 | End: 2021-01-13 | Stop reason: HOSPADM

## 2020-12-22 RX ORDER — ERYTHROMYCIN 500 MG/1
500 TABLET, COATED ORAL 4 TIMES DAILY
Qty: 40 TABLET | Refills: 0 | Status: SHIPPED | OUTPATIENT
Start: 2020-12-22 | End: 2021-01-01

## 2020-12-22 RX ORDER — CLOBETASOL PROPIONATE 0.5 MG/G
OINTMENT TOPICAL ONCE
Status: CANCELLED | OUTPATIENT
Start: 2020-12-22 | End: 2020-12-22

## 2020-12-22 RX ORDER — BACITRACIN ZINC AND POLYMYXIN B SULFATE 500; 1000 [USP'U]/G; [USP'U]/G
OINTMENT TOPICAL ONCE
Status: CANCELLED | OUTPATIENT
Start: 2020-12-22 | End: 2020-12-22

## 2020-12-22 RX ORDER — GINSENG 100 MG
CAPSULE ORAL ONCE
Status: CANCELLED | OUTPATIENT
Start: 2020-12-22 | End: 2020-12-22

## 2020-12-22 RX ORDER — LIDOCAINE 40 MG/G
CREAM TOPICAL ONCE
Status: CANCELLED | OUTPATIENT
Start: 2020-12-22 | End: 2020-12-22

## 2020-12-22 RX ORDER — LIDOCAINE 50 MG/G
OINTMENT TOPICAL ONCE
Status: CANCELLED | OUTPATIENT
Start: 2020-12-22 | End: 2020-12-22

## 2020-12-22 RX ORDER — BETAMETHASONE DIPROPIONATE 0.05 %
OINTMENT (GRAM) TOPICAL ONCE
Status: CANCELLED | OUTPATIENT
Start: 2020-12-22 | End: 2020-12-22

## 2020-12-22 ASSESSMENT — PAIN DESCRIPTION - DESCRIPTORS: DESCRIPTORS: SHARP

## 2020-12-22 ASSESSMENT — PAIN DESCRIPTION - ONSET: ONSET: ON-GOING

## 2020-12-22 ASSESSMENT — PAIN DESCRIPTION - PROGRESSION: CLINICAL_PROGRESSION: GRADUALLY WORSENING

## 2020-12-22 ASSESSMENT — PAIN DESCRIPTION - ORIENTATION: ORIENTATION: RIGHT

## 2020-12-22 ASSESSMENT — PAIN DESCRIPTION - PAIN TYPE: TYPE: ACUTE PAIN

## 2020-12-22 ASSESSMENT — PAIN DESCRIPTION - LOCATION: LOCATION: TOE (COMMENT WHICH ONE)

## 2020-12-22 ASSESSMENT — PAIN - FUNCTIONAL ASSESSMENT: PAIN_FUNCTIONAL_ASSESSMENT: PREVENTS OR INTERFERES SOME ACTIVE ACTIVITIES AND ADLS

## 2020-12-22 ASSESSMENT — PAIN DESCRIPTION - FREQUENCY: FREQUENCY: INTERMITTENT

## 2020-12-22 ASSESSMENT — PAIN SCALES - GENERAL: PAINLEVEL_OUTOF10: 5

## 2020-12-22 NOTE — PROGRESS NOTES
Wound Care Center Progress Note With Procedure    Severa Jing  AGE: 36 y.o. GENDER: male  : 1980  EPISODE DATE:  2020     Subjective:     Chief Complaint   Patient presents with    Wound Check     right foot and 3rd toe          HISTORY of PRESENT ILLNESS   Maritza Jiménez a 44 y. o. male who presents today for wound evaluation of Chronic diabetic and pressure ulcer of right midfoot.  The ulcer is of moderate severity.  The underlying cause of the wound is diabetes and pressure.   Wound Pain Timing/Severity: intermittent, moderate  Quality of pain: aching, throbbing, tender  Severity of pain:  5 / 10   Modifying Factors: edema, diabetes, poor glucose control, chronic pressure, decreased mobility and shear force  Associated Signs/Symptoms: edema, erythema, drainage and pain        PAST MEDICAL HISTORY        Diagnosis Date    Callus of foot     Right foot - see's Dr. Annalisa Kennedy Dizziness     positional    Essential hypertension     Follows with PCP    Lumbar radiculopathy        PAST SURGICAL HISTORY    Past Surgical History:   Procedure Laterality Date    [facility]  2018    [facility]    DENTAL SURGERY      teeth extractions -half per patient    HERNIA REPAIR Bilateral 2020    BIALTERAL HERNIA INGUINAL REPAIR performed by Damaris Eid MD at [address] [place] [address]      [place] OFFICE/OUTPT VISIT,PROCEDURE ONLY Left 2018    L5-S1 HEMILAMINECTOMY, REMOVAL OF DISC LEFT SIDE performed by Marques Jackson MD at [address] [place] [address]    Family History   Problem Relation Age of Onset    Heart Disease Father     Diabetes Father     Diabetes Mother        SOCIAL HISTORY    Social History     Tobacco Use    Smoking status: Never Smoker    Smokeless tobacco: Never Used   Substance Use Topics    Alcohol use: Yes     Comment: \"couple beers a night\"    Drug use: No       ALLERGIES    No Known Allergies    MEDICATIONS    Current Outpatient Medications on File Prior to Encounter   Medication Sig Dispense Refill    sulfamethoxazole-trimethoprim (BACTRIM DS;SEPTRA DS) 800-160 MG per tablet Take 1 tablet by mouth 2 times daily for 10 days 20 tablet 0    omeprazole (PRILOSEC) 20 MG delayed release capsule TAKE 1 CAPSULE BY MOUTH ONCE DAILY IN THE MORNING 90 capsule 1    lisinopril-hydroCHLOROthiazide (PRINZIDE;ZESTORETIC) 20-12.5 MG per tablet TAKE 1 TABLET BY MOUTH ONCE DAILY 90 tablet 1    blood glucose monitor kit and supplies Dispense sufficient amount for indicated testing frequency plus additional to accommodate PRN testing needs. Dispense all needed supplies to include: monitor, strips, lancing device, lancets, control solutions, alcohol swabs. 1 kit 0    blood glucose monitor strips Test 2 times a day & as needed for symptoms of irregular blood glucose. Dispense sufficient amount for indicated testing frequency plus additional to accommodate PRN testing needs. 100 strip 0    FreeStyle Lancets MISC 1 each by Does not apply route daily 100 each 3    aspirin 81 MG tablet Take 81 mg by mouth daily      acetaminophen (TYLENOL) 325 MG tablet Take 2 tablets by mouth every 4 hours as needed for Pain or Fever 120 tablet 3    pioglitazone (ACTOS) 15 MG tablet Take 1 tablet by mouth daily 30 tablet 5    metFORMIN (GLUCOPHAGE) 500 MG tablet Take 2 tablets by mouth 2 times daily (with meals) 360 tablet 1    glyBURIDE (DIABETA) 5 MG tablet Take 2 tablets by mouth 2 times daily (with meals) 360 tablet 1     No current facility-administered medications on file prior to encounter. REVIEW OF SYSTEMS    Pertinent items are noted in HPI. Constitutional: Negative for systemic symptoms including fever, chills and malaise. Objective:      BP (!) 153/79   Pulse 90   Temp 97.8 °F (36.6 °C) (Temporal)   Resp 18     PHYSICAL EXAM    General: The patient is in no acute distress.     Mental status: Patient is appropriate, is oriented to place and plan of care. Dermatologic exam: Visual inspection of the periwound reveals the skin to be edematous  Wound exam: see wound description below in procedure note      Assessment:     Problem List Items Addressed This Visit     Diabetic ulcer of right midfoot associated with type 2 diabetes mellitus, with fat layer exposed ([address] - Primary    Relevant Orders    Supply: Wound Cleanser    Supply: Wound Dressings    Supply: Cover and Secure    XR FOOT RIGHT (2 VIEWS)    WD-Cellulitis of foot right        Procedure Note    Indications:  Based on my examination of this patient's wound(s) today, sharp excision into necrotic subcutaneous tissue is required to promote healing and evaluate the extent of previous healing. Performed by: RAFAEL Kenyon - CNP    Consent obtained: Yes    Time out taken:  Yes    Pain Control: Anesthetic  Anesthetic: 4% Lidocaine Liquid Topical        Debridement:Excisional Debridement    Using curette and #15 blade scalpel the wound(s) was/were sharply debrided down through and including the removal of subcutaneous tissue.         Devitalized Tissue Debrided:  fibrin, biofilm, slough, exudate and callus    Pre Debridement Measurements:  Are located in the Wound Documentation Flow Sheet    All active wounds listed below with today's date are evaluated  Wound(s)    debrided this date include # : 1     Post  Debridement Measurements:  Wound 12/15/20 Right;Plantar # 1 (Active)   Wound Image   12/15/20 0943   Dressing Status New dressing applied 12/22/20 1022   Wound Cleansed Irrigated with saline 12/22/20 0937   Wound Length (cm) 0.9 cm 12/22/20 0937   Wound Width (cm) 1 cm 12/22/20 0937   Wound Depth (cm) 0.6 cm 12/22/20 0937   Wound Surface Area (cm^2) 0.9 cm^2 12/22/20 0937   Change in Wound Size % (l*w) -11.11 12/22/20 0937   Wound Volume (cm^3) 0.54 cm^3 12/22/20 0937   Wound Healing % -35 12/22/20 0937   Post-Procedure Length (cm) 0.9 cm 12/22/20 0955   Post-Procedure Width (cm) 1 cm 12/22/20 0955   Post-Procedure Depth (cm) 0.6 cm 12/22/20 0955   Post-Procedure Surface Area (cm^2) 0.9 cm^2 12/22/20 0955   Post-Procedure Volume (cm^3) 0.54 cm^3 12/22/20 0955   Distance Tunneling (cm) 0 cm 12/22/20 0937   Tunneling Position ___ O'Clock 0 12/22/20 0937   Undermining Starts ___ O'Clock 0 12/22/20 0937   Undermining Ends___ O'Clock 0 12/22/20 0937   Undermining Maxium Distance (cm) 0 12/22/20 0937   Wound Assessment Pink/red;Slough 12/22/20 0937   Drainage Amount Moderate 12/22/20 0937   Drainage Description Serosanguinous 12/22/20 0937   Odor None 12/22/20 0937   Rosalie-wound Assessment Hyperkeratosis (callous) 12/22/20 0937   Margins Attached edges 12/22/20 0937   Wound Thickness Description not for Pressure Injury Full thickness 12/22/20 0937   Number of days: 7       Percent of Wound(s) Debrided: approximately 100%    Total  Area  Debrided:  0.9 sq cm     Bleeding:  Minimal    Hemostasis Achieved:  by pressure    Procedural Pain:  5  / 10     Post Procedural Pain:  2 / 10     Response to treatment:  Well tolerated by patient. Status of wound progress and description from last visit: Larger today, 3rd toe right foot red, warm and swollen. Xray right foot ordered. The wound was cultured last week and he was started on Bactrim for 10 days (12/15-12/25/2020. Wound culture demonstrates staph aureus moderate growth and peptostreptococcus light growth. Started on erythromycin today 12/22-1/1/2020 and extended Bactrim. Dr. Perdue Care wants wound care to follow for now. Will monitor for Xray results. Plan:       Discharge Instructions       PHYSICIAN ORDERS AND DISCHARGE INSTRUCTIONS     NOTE: Upon discharge from the [address], you will receive a patient experience survey.  We would be grateful if you would take the time to fill this survey out.     Wound care order history:                 YADY's   Right       Left               Date:              Cultures:                Grafts:                Antibiotics:           Continuing wound care orders and information:                 Residence:  Home              Continue home health care with:               Your wound-care supplies will be provided by: [facility]     Wound cleansing:               FU not scrub or use excessive force.              Wash hands with soap and water before and after dressing changes.             MEBWF to applying a clean dressing, cleanse wound with normal saline, wound cleanser, or mild soap and water.               Ask the physician or nurse before getting the wound(s) wet in a shower     Daily Wound management:              Keep weight off wounds and reposition every 2 hours.              Avoid standing for long periods of time.              Apply wraps/stockings in AM and remove at bedtime.              If swelling is present, elevate legs to the level of the heart or above for 30 minutes 4-5 times a day and/or when sitting.                                      When taking antibiotics take entire prescription as ordered by physician do not stop taking until medicine is all gone.                                                     Orders for this week:  12/22/2020     X-ray ordered 12/22/20                   Right Plantar Foot-- Clean with soap and water, pat dry. Apply Shwetha to wound bed, cover with Ca Alginate and ABD. Wrap with Kerlix and secure with tape. Change daily.    Tubi E         Follow up with Manish Cast in 1 week in wound clinic,  Call [phone] for any questions or concerns. Date__________   Time___________        Treatment Note Wound 12/15/20 Right;Plantar # 1-Dressing/Treatment: (shwetha, ca alg, conform tape. tubi e )    Written Patient Dismissal Instructions Given            Electronically signed by RAFAEL Roper CNP on 12/22/2020 at 10:23 AM

## 2020-12-24 ENCOUNTER — OFFICE VISIT (OUTPATIENT)
Dept: PRIMARY CARE CLINIC | Age: 40
End: 2020-12-24
Payer: COMMERCIAL

## 2020-12-24 ENCOUNTER — HOSPITAL ENCOUNTER (OUTPATIENT)
Age: 40
Setting detail: SPECIMEN
Discharge: HOME | End: 2020-12-24
Payer: COMMERCIAL

## 2020-12-24 VITALS — TEMPERATURE: 97.1 F | OXYGEN SATURATION: 100 % | RESPIRATION RATE: 17 BRPM | HEART RATE: 102 BPM

## 2020-12-24 PROCEDURE — G8420 CALC BMI NORM PARAMETERS: HCPCS | Performed by: NURSE PRACTITIONER

## 2020-12-24 PROCEDURE — 1036F TOBACCO NON-USER: CPT | Performed by: NURSE PRACTITIONER

## 2020-12-24 PROCEDURE — G8482 FLU IMMUNIZE ORDER/ADMIN: HCPCS | Performed by: NURSE PRACTITIONER

## 2020-12-24 PROCEDURE — G8427 DOCREV CUR MEDS BY ELIG CLIN: HCPCS | Performed by: NURSE PRACTITIONER

## 2020-12-24 PROCEDURE — U0002 COVID-19 LAB TEST NON-CDC: HCPCS

## 2020-12-24 PROCEDURE — 99213 OFFICE O/P EST LOW 20 MIN: CPT | Performed by: NURSE PRACTITIONER

## 2020-12-24 NOTE — PATIENT INSTRUCTIONS
possible, have another member of your household care for your animals while you are sick. If you are sick with COVID-19, avoid contact with your pet, including petting, snuggling, being kissed or licked, and sharing food. If you must care for your pet or be around animals while you are sick, wash your hands before and after you interact with pets and wear a facemask. See COVID-19 and Animals for more information. Other considerations   The ill person should eat/be fed in their room if possible. Non-disposable  items used should be handled with gloves and washed with hot water or in a . Clean hands after handling used  items.  If possible, dedicate a lined trash can for the ill person. Use gloves when removing garbage bags, handling, and disposing of trash. Wash hands after handling or disposing of trash.  Consider consulting with your local health department about trash disposal guidance if available. Information for Household Members and Caregivers of Someone who is Sick   Call ahead before visiting your doctor   Call ahead: If you have a medical appointment, call the healthcare provider and tell them that you have or may have COVID-19. This will help the healthcare provider's office take steps to keep other people from getting infected or exposed. Wear a facemask if you are sick   ; If you are sick: You should wear a facemask when you are around other people (e.g., sharing a room or vehicle) or pets and before you enter a healthcare provider's office. ; If you are caring for others: If the person who is sick is not able to wear a facemask (for example, because it causes trouble breathing), then people who live with the person who is sick should not stay in the same room with them, or they should wear a facemask if they enter a room with the person who is sick. Cover your coughs and sneezes   ;  Cover: Cover your mouth and nose with a tissue when you cough or sneeze.   ; Dispose: Throw used tissues in a lined trash can.   ; Wash hands: Immediately wash your hands with soap and water for at least 20 seconds or, if soap and water are not available, clean your hands with an alcohol-based hand  that contains at least 60% alcohol. Clean your hands often   ; Wash hands: Wash your hands often with soap and water for at least 20 seconds, especially after blowing your nose, coughing, or sneezing; going to the bathroom; and before eating or preparing food.   ; Hand : If soap and water are not readily available, use an alcohol-based hand  with at least 60% alcohol, covering all surfaces of your hands and rubbing them together until they feel dry.   ; Soap and water: Soap and water are the best option if hands are visibly dirty.   ; Avoid touching: Avoid touching your eyes, nose, and mouth with unwashed hands. Handwashing Tips   ; Wet your hands with clean, running water (warm or cold), turn off the tap, and apply soap.  ; Lather your hands by rubbing them together with the soap. Lather the backs of your hands, between your fingers, and under your nails. ; Scrub your hands for at least 20 seconds. Need a timer? Hum the [place] from beginning to end twice.  ; Rinse your hands well under clean, running water.  ; Dry your hands using a clean towel or air dry them. Avoid sharing personal household items   ; Do not share: You should not share dishes, drinking glasses, cups, eating utensils, towels, or bedding with other people or pets in your home.   ; Wash thoroughly after use: After using these items, they should be washed thoroughly with soap and water.   Clean all high-touch surfaces everyday   ; Clean and disinfect: Practice routine cleaning of high touch surfaces.  ; High touch surfaces include counters, tabletops, doorknobs, bathroom fixtures, toilets, phones, keyboards, tablets, and bedside tables.  ; Disinfect areas with bodily fluids: Also, clean any surfaces that may have blood, stool, or body fluids on them.   ; Household : Use a household cleaning spray or wipe, according to the label instructions. Labels contain instructions for safe and effective use of the cleaning product including precautions you should take when applying the product, such as wearing gloves and making sure you have good ventilation during use of the product. Monitor your symptoms   Seek medical attention: Seek prompt medical attention if your illness is worsening     (e.g., difficulty breathing).   ; Call your doctor: Before seeking care, call your healthcare provider and tell them that you have, or are being evaluated for, COVID-19.   ; Wear a facemask when sick: Put on a facemask before you enter the facility. These steps will help the healthcare provider's office to keep other people in the office or waiting room from getting infected or exposed. ; Alert health department: Ask your healthcare provider to call the local or state health department. Persons who are placed under active monitoring or facilitated self-monitoring should follow instructions provided by their local health department or occupational health professionals, as appropriate.  ; Call 911 if you have a medical emergency: If you have a medical emergency and need to call 911, notify the dispatch personnel that you have, or are being evaluated for COVID-19. If possible, put on a facemask before emergency medical services arrive.

## 2020-12-24 NOTE — PROGRESS NOTES
12/24/2020    HPI:  Chief complaint and history of present illness as per medical assistant/nurse documented today in the Flu/COVID-19 clinic. He is being seen today for a 6 day history of productive cough of clear sputum, chest and nasal congestion, tolerable headache, sore throat, muscle aches, and 1 episode of vomiting last pm.  He is diabetic and states that as of yesterday his blood sugar has been running an avg of 150. He has not checked yet today. He is currently on antibiotics for a chronic diabetic foot wound and is followed by the [address] [facility],[address]. Denies increased redness, swelling, fever, chills, difficulty breathing, sob, chest pain. MEDICATIONS:  Prior to Visit Medications    Medication Sig Taking?  Authorizing Provider   erythromycin base (E-MYCIN) 500 MG tablet Take 1 tablet by mouth 4 times daily for 10 days Yes RAFAEL Kenyon CNP   omeprazole (PRILOSEC) 20 MG delayed release capsule TAKE 1 CAPSULE BY MOUTH ONCE DAILY IN THE MORNING Yes Nuno Fonseca MD   lisinopril-hydroCHLOROthiazide (PRINZIDE;ZESTORETIC) 20-12.5 MG per tablet TAKE 1 TABLET BY MOUTH ONCE DAILY Yes Nuno Fonseca MD   sulfamethoxazole-trimethoprim (BACTRIM DS;SEPTRA DS) 800-160 MG per tablet Take 1 tablet by mouth 2 times daily for 10 days  Patient not taking: Reported on 12/24/2020  RAFAEL Kenyon CNP   sulfamethoxazole-trimethoprim (BACTRIM DS;SEPTRA DS) 800-160 MG per tablet Take 1 tablet by mouth 2 times daily for 10 days  Patient not taking: Reported on 12/24/2020  RAFAEL Shabazz CNP   pioglitazone (ACTOS) 15 MG tablet Take 1 tablet by mouth daily  Nuno Fonseca MD   metFORMIN (GLUCOPHAGE) 500 MG tablet Take 2 tablets by mouth 2 times daily (with meals)  Nuno Fonseca MD   glyBURIDE (DIABETA) 5 MG tablet Take 2 tablets by mouth 2 times daily (with meals)  Nuno Fonseca MD   blood glucose monitor kit and supplies Dispense sufficient amount for indicated testing frequency plus additional to accommodate PRN testing needs. Dispense all needed supplies to include: monitor, strips, lancing device, lancets, control solutions, alcohol swabs. Rocco Burt MD   blood glucose monitor strips Test 2 times a day & as needed for symptoms of irregular blood glucose. Dispense sufficient amount for indicated testing frequency plus additional to accommodate PRN testing needs. Rocco Burt MD   FreeStyle Lancets MISC 1 each by Does not apply route daily  Rocco Burt MD   aspirin 81 MG tablet Take 81 mg by mouth daily  Historical Provider, MD   acetaminophen (TYLENOL) 325 MG tablet Take 2 tablets by mouth every 4 hours as needed for Pain or Fever  Mariama Leija MD           PHYSICAL EXAM:  Physical Exam  Constitutional:       General: He is not in acute distress. Appearance: Normal appearance. He is well-developed. He is not ill-appearing, toxic-appearing or diaphoretic. HENT:      Head: Normocephalic and atraumatic. Right Ear: Tympanic membrane, ear canal and external ear normal.      Left Ear: Tympanic membrane, ear canal and external ear normal.      Nose: Congestion present. Mouth/Throat:      Mouth: Mucous membranes are moist.      Pharynx: No oropharyngeal exudate or posterior oropharyngeal erythema. Eyes:      Extraocular Movements: Extraocular movements intact. Conjunctiva/sclera: Conjunctivae normal.      Pupils: Pupils are equal, round, and reactive to light. Neck:      Musculoskeletal: Full passive range of motion without pain, normal range of motion and neck supple. No neck rigidity or muscular tenderness. Thyroid: No thyroid mass or thyromegaly. Trachea: Trachea normal.   Cardiovascular:      Rate and Rhythm: Normal rate and regular rhythm. Pulses: Normal pulses. Heart sounds: S1 normal and S2 normal.   Pulmonary:      Effort: Pulmonary effort is normal. No accessory muscle usage or respiratory distress. doctor    To Whom it May Concern:    Seble Henley has been tested for COVID on 12/24/20. They may NOT return to work until their lab test results back and they been fever free for 3 days. If test is positive they must stay home for 2 weeks or until they test negative or as directed by the [facility] Department.  In addition to other information, the printed after visit summary provided to the patient includes:  [x] COVID-19 Self care instructions  [x] COVID-19 General patient information

## 2020-12-24 NOTE — PROGRESS NOTES
12/24/20  Willian Diaz  1980    FLU/COVID-19 CLINIC EVALUATION    HPI SYMPTOMS: COVID (-)    Employer: Unemployed    [] Fevers  [] Chills  [x] Cough  [] Coughing up blood  [x] Chest Congestion  [x] Nasal Congestion  [] Feeling short of breath  [] Sometimes  [] Frequently  [] All the time  [] Chest pain  [x] Headaches  [x]Tolerable  [] Severe  [x] Sore throat  [x] Muscle aches  [] Nausea  [x] Vomiting  []Unable to keep fluids down  [] Diarrhea  []Severe    [] OTHER SYMPTOMS:      Symptom Duration:   [] 1  [] 2   [] 3   [] 4    [] 5   [x] 6   [] 7   [] 8   [] 9   [] 10   [] 11   [] 12   [] 13   [] 14   [] Longer than 14 days    Symptom course:   [x] Worsening     [] Stable     [] Improving    RISK FACTORS:    [] Pregnant or possibly pregnant  [] Age over 61  [x] Diabetes  [] Heart disease  [] Asthma  [] COPD/Other chronic lung diseases  [] Active Cancer  [] On Chemotherapy  [] Taking oral steroids  [] History Lymphoma/Leukemia  [] Close contact with a lab confirmed COVID-19 patient within 14 days of symptom onset  [] History of travel from affected geographical areas within 14 days of symptom onset       VITALS:  There were no vitals filed for this visit. TESTS:    POCT FLU:  [] Positive     []Negative    ASSESSMENT:    [] Flu  [] Possible COVID-19  [] Strep    PLAN:    [] Discharge home with written instructions for:  [] Flu management  [] Possible COVID-19 infection with self-quarantine and management of symptoms  [] Follow-up with primary care physician or emergency department if worsens  [] Evaluation per physician/NP/PA in clinic  [] Sent to ER       An  electronic signature was used to authenticate this note.      --Ava Rosa MA on 12/24/2020 at 9:21 AM

## 2020-12-25 LAB
SARS-COV-2: NOT DETECTED
SOURCE: NORMAL

## 2020-12-29 ENCOUNTER — TELEPHONE (OUTPATIENT)
Dept: FAMILY MEDICINE CLINIC | Age: 40
End: 2020-12-29

## 2020-12-29 NOTE — TELEPHONE ENCOUNTER
Negative Covid Test. [facility]    Still has terrible cough non productive ,chest congestion.  Sinus drainage  Does not have fever but has no thermometer   [address]

## 2020-12-31 RX ORDER — AZITHROMYCIN 250 MG/1
250 TABLET, FILM COATED ORAL SEE ADMIN INSTRUCTIONS
Qty: 6 TABLET | Refills: 0 | Status: ON HOLD | OUTPATIENT
Start: 2020-12-31 | End: 2021-01-13 | Stop reason: HOSPADM

## 2020-12-31 NOTE — TELEPHONE ENCOUNTER
Requested Prescriptions     Signed Prescriptions Disp Refills    azithromycin (ZITHROMAX) 250 MG tablet 6 tablet 0     Sig: Take 1 tablet by mouth See Admin Instructions for 5 days 500mg on day 1 followed by 250mg on days 2 - 5     Authorizing Provider: Helen Lewis     Ordering User: LEAH STARKS     Pt informed

## 2021-01-04 ENCOUNTER — HOSPITAL ENCOUNTER (INPATIENT)
Age: 41
LOS: 9 days | Discharge: HOME HEALTH | DRG: 305 | End: 2021-01-13
Attending: FAMILY MEDICINE | Admitting: INTERNAL MEDICINE
Payer: COMMERCIAL

## 2021-01-04 ENCOUNTER — APPOINTMENT (OUTPATIENT)
Dept: GENERAL RADIOLOGY | Age: 41
DRG: 305 | End: 2021-01-04
Payer: COMMERCIAL

## 2021-01-04 DIAGNOSIS — E11.628 DIABETIC FOOT INFECTION (HCC): Primary | ICD-10-CM

## 2021-01-04 DIAGNOSIS — E11.69 DIABETIC FOOT ULCER WITH OSTEOMYELITIS (HCC): ICD-10-CM

## 2021-01-04 DIAGNOSIS — M86.9 DIABETIC FOOT ULCER WITH OSTEOMYELITIS (HCC): ICD-10-CM

## 2021-01-04 DIAGNOSIS — L97.509 DIABETIC FOOT ULCER WITH OSTEOMYELITIS (HCC): ICD-10-CM

## 2021-01-04 DIAGNOSIS — L08.9 DIABETIC FOOT INFECTION (HCC): Primary | ICD-10-CM

## 2021-01-04 DIAGNOSIS — E11.621 DIABETIC FOOT ULCER WITH OSTEOMYELITIS (HCC): ICD-10-CM

## 2021-01-04 DIAGNOSIS — M86.9 OSTEOMYELITIS OF RIGHT FOOT, UNSPECIFIED TYPE (HCC): ICD-10-CM

## 2021-01-04 LAB
ALBUMIN SERPL-MCNC: 3.8 GM/DL (ref 3.4–5)
ALP BLD-CCNC: 58 IU/L (ref 40–128)
ALT SERPL-CCNC: 16 U/L (ref 10–40)
ANION GAP SERPL CALCULATED.3IONS-SCNC: 12 MMOL/L (ref 4–16)
AST SERPL-CCNC: 18 IU/L (ref 15–37)
BASOPHILS ABSOLUTE: 0.1 K/CU MM
BASOPHILS RELATIVE PERCENT: 0.7 % (ref 0–1)
BILIRUB SERPL-MCNC: 0.4 MG/DL (ref 0–1)
BUN BLDV-MCNC: 7 MG/DL (ref 6–23)
CALCIUM SERPL-MCNC: 8.9 MG/DL (ref 8.3–10.6)
CHLORIDE BLD-SCNC: 98 MMOL/L (ref 99–110)
CO2: 26 MMOL/L (ref 21–32)
CREAT SERPL-MCNC: 0.7 MG/DL (ref 0.9–1.3)
DIFFERENTIAL TYPE: ABNORMAL
EOSINOPHILS ABSOLUTE: 0.1 K/CU MM
EOSINOPHILS RELATIVE PERCENT: 1.5 % (ref 0–3)
ESTIMATED AVERAGE GLUCOSE: 177 MG/DL
GFR AFRICAN AMERICAN: >60 ML/MIN/1.73M2
GFR NON-AFRICAN AMERICAN: >60 ML/MIN/1.73M2
GLUCOSE BLD-MCNC: 109 MG/DL (ref 70–99)
GLUCOSE BLD-MCNC: 168 MG/DL (ref 70–99)
GLUCOSE BLD-MCNC: 208 MG/DL (ref 70–99)
HBA1C MFR BLD: 7.8 % (ref 4.2–6.3)
HCT VFR BLD CALC: 39.5 % (ref 42–52)
HEMOGLOBIN: 12.8 GM/DL (ref 13.5–18)
IMMATURE NEUTROPHIL %: 0.3 % (ref 0–0.43)
LACTATE: 1.9 MMOL/L (ref 0.4–2)
LYMPHOCYTES ABSOLUTE: 2.6 K/CU MM
LYMPHOCYTES RELATIVE PERCENT: 34.8 % (ref 24–44)
MCH RBC QN AUTO: 30.4 PG (ref 27–31)
MCHC RBC AUTO-ENTMCNC: 32.4 % (ref 32–36)
MCV RBC AUTO: 93.8 FL (ref 78–100)
MONOCYTES ABSOLUTE: 0.7 K/CU MM
MONOCYTES RELATIVE PERCENT: 9.5 % (ref 0–4)
NUCLEATED RBC %: 0 %
PDW BLD-RTO: 11.5 % (ref 11.7–14.9)
PLATELET # BLD: 533 K/CU MM (ref 140–440)
PMV BLD AUTO: 9 FL (ref 7.5–11.1)
POTASSIUM SERPL-SCNC: 4.1 MMOL/L (ref 3.5–5.1)
RBC # BLD: 4.21 M/CU MM (ref 4.6–6.2)
SEGMENTED NEUTROPHILS ABSOLUTE COUNT: 3.9 K/CU MM
SEGMENTED NEUTROPHILS RELATIVE PERCENT: 53.2 % (ref 36–66)
SODIUM BLD-SCNC: 136 MMOL/L (ref 135–145)
TOTAL IMMATURE NEUTOROPHIL: 0.02 K/CU MM
TOTAL NUCLEATED RBC: 0 K/CU MM
TOTAL PROTEIN: 9.5 GM/DL (ref 6.4–8.2)
WBC # BLD: 7.4 K/CU MM (ref 4–10.5)

## 2021-01-04 PROCEDURE — 84484 ASSAY OF TROPONIN QUANT: CPT

## 2021-01-04 PROCEDURE — 99285 EMERGENCY DEPT VISIT HI MDM: CPT

## 2021-01-04 PROCEDURE — 71045 X-RAY EXAM CHEST 1 VIEW: CPT

## 2021-01-04 PROCEDURE — 96375 TX/PRO/DX INJ NEW DRUG ADDON: CPT

## 2021-01-04 PROCEDURE — 0Y6T0Z0 DETACHMENT AT RIGHT 3RD TOE, COMPLETE, OPEN APPROACH: ICD-10-PCS | Performed by: PODIATRIST

## 2021-01-04 PROCEDURE — 0Y6M0Z4 DETACHMENT AT RIGHT FOOT, COMPLETE 1ST RAY, OPEN APPROACH: ICD-10-PCS | Performed by: PODIATRIST

## 2021-01-04 PROCEDURE — 87075 CULTR BACTERIA EXCEPT BLOOD: CPT

## 2021-01-04 PROCEDURE — 2580000003 HC RX 258: Performed by: NURSE PRACTITIONER

## 2021-01-04 PROCEDURE — 0L8V0ZZ DIVISION OF RIGHT FOOT TENDON, OPEN APPROACH: ICD-10-PCS | Performed by: PODIATRIST

## 2021-01-04 PROCEDURE — 82962 GLUCOSE BLOOD TEST: CPT

## 2021-01-04 PROCEDURE — 1200000000 HC SEMI PRIVATE

## 2021-01-04 PROCEDURE — 83036 HEMOGLOBIN GLYCOSYLATED A1C: CPT

## 2021-01-04 PROCEDURE — 73630 X-RAY EXAM OF FOOT: CPT

## 2021-01-04 PROCEDURE — 96365 THER/PROPH/DIAG IV INF INIT: CPT

## 2021-01-04 PROCEDURE — 36415 COLL VENOUS BLD VENIPUNCTURE: CPT

## 2021-01-04 PROCEDURE — 2580000003 HC RX 258: Performed by: PHYSICIAN ASSISTANT

## 2021-01-04 PROCEDURE — 87040 BLOOD CULTURE FOR BACTERIA: CPT

## 2021-01-04 PROCEDURE — 6370000000 HC RX 637 (ALT 250 FOR IP): Performed by: NURSE PRACTITIONER

## 2021-01-04 PROCEDURE — 83605 ASSAY OF LACTIC ACID: CPT

## 2021-01-04 PROCEDURE — 85025 COMPLETE CBC W/AUTO DIFF WBC: CPT

## 2021-01-04 PROCEDURE — 87070 CULTURE OTHR SPECIMN AEROBIC: CPT

## 2021-01-04 PROCEDURE — 6360000002 HC RX W HCPCS: Performed by: NURSE PRACTITIONER

## 2021-01-04 PROCEDURE — 6360000002 HC RX W HCPCS

## 2021-01-04 PROCEDURE — 93005 ELECTROCARDIOGRAM TRACING: CPT | Performed by: STUDENT IN AN ORGANIZED HEALTH CARE EDUCATION/TRAINING PROGRAM

## 2021-01-04 PROCEDURE — 0Y6M0Z5 DETACHMENT AT RIGHT FOOT, COMPLETE 2ND RAY, OPEN APPROACH: ICD-10-PCS | Performed by: PODIATRIST

## 2021-01-04 PROCEDURE — 6360000002 HC RX W HCPCS: Performed by: PHYSICIAN ASSISTANT

## 2021-01-04 PROCEDURE — 87081 CULTURE SCREEN ONLY: CPT

## 2021-01-04 PROCEDURE — 96366 THER/PROPH/DIAG IV INF ADDON: CPT

## 2021-01-04 PROCEDURE — 80053 COMPREHEN METABOLIC PANEL: CPT

## 2021-01-04 RX ORDER — PROMETHAZINE HYDROCHLORIDE 12.5 MG/1
12.5 TABLET ORAL EVERY 6 HOURS PRN
Status: DISCONTINUED | OUTPATIENT
Start: 2021-01-04 | End: 2021-01-13 | Stop reason: HOSPADM

## 2021-01-04 RX ORDER — SODIUM CHLORIDE 0.9 % (FLUSH) 0.9 %
10 SYRINGE (ML) INJECTION PRN
Status: DISCONTINUED | OUTPATIENT
Start: 2021-01-04 | End: 2021-01-13 | Stop reason: HOSPADM

## 2021-01-04 RX ORDER — TRAMADOL HYDROCHLORIDE 50 MG/1
50 TABLET ORAL EVERY 6 HOURS PRN
Status: DISCONTINUED | OUTPATIENT
Start: 2021-01-04 | End: 2021-01-13 | Stop reason: HOSPADM

## 2021-01-04 RX ORDER — ACETAMINOPHEN 325 MG/1
650 TABLET ORAL EVERY 6 HOURS PRN
Status: DISCONTINUED | OUTPATIENT
Start: 2021-01-04 | End: 2021-01-13 | Stop reason: HOSPADM

## 2021-01-04 RX ORDER — ACETAMINOPHEN 650 MG/1
650 SUPPOSITORY RECTAL EVERY 6 HOURS PRN
Status: DISCONTINUED | OUTPATIENT
Start: 2021-01-04 | End: 2021-01-13 | Stop reason: HOSPADM

## 2021-01-04 RX ORDER — NICOTINE POLACRILEX 4 MG
15 LOZENGE BUCCAL PRN
Status: DISCONTINUED | OUTPATIENT
Start: 2021-01-04 | End: 2021-01-13 | Stop reason: HOSPADM

## 2021-01-04 RX ORDER — LORAZEPAM 2 MG/ML
INJECTION INTRAMUSCULAR
Status: COMPLETED
Start: 2021-01-04 | End: 2021-01-04

## 2021-01-04 RX ORDER — ASPIRIN 81 MG/1
81 TABLET, CHEWABLE ORAL DAILY
Status: DISCONTINUED | OUTPATIENT
Start: 2021-01-04 | End: 2021-01-13 | Stop reason: HOSPADM

## 2021-01-04 RX ORDER — LISINOPRIL AND HYDROCHLOROTHIAZIDE 20; 12.5 MG/1; MG/1
1 TABLET ORAL DAILY
Status: DISCONTINUED | OUTPATIENT
Start: 2021-01-04 | End: 2021-01-09

## 2021-01-04 RX ORDER — ONDANSETRON 2 MG/ML
4 INJECTION INTRAMUSCULAR; INTRAVENOUS EVERY 6 HOURS PRN
Status: DISCONTINUED | OUTPATIENT
Start: 2021-01-04 | End: 2021-01-13 | Stop reason: HOSPADM

## 2021-01-04 RX ORDER — SODIUM CHLORIDE 0.9 % (FLUSH) 0.9 %
10 SYRINGE (ML) INJECTION EVERY 12 HOURS SCHEDULED
Status: DISCONTINUED | OUTPATIENT
Start: 2021-01-04 | End: 2021-01-13 | Stop reason: HOSPADM

## 2021-01-04 RX ORDER — DEXTROSE MONOHYDRATE 25 G/50ML
12.5 INJECTION, SOLUTION INTRAVENOUS PRN
Status: DISCONTINUED | OUTPATIENT
Start: 2021-01-04 | End: 2021-01-13 | Stop reason: HOSPADM

## 2021-01-04 RX ORDER — LORAZEPAM 2 MG/ML
2 INJECTION INTRAMUSCULAR EVERY 4 HOURS PRN
Status: COMPLETED | OUTPATIENT
Start: 2021-01-04 | End: 2021-01-07

## 2021-01-04 RX ORDER — MORPHINE SULFATE 2 MG/ML
2 INJECTION, SOLUTION INTRAMUSCULAR; INTRAVENOUS ONCE
Status: COMPLETED | OUTPATIENT
Start: 2021-01-04 | End: 2021-01-04

## 2021-01-04 RX ORDER — LORAZEPAM 2 MG/ML
2 INJECTION INTRAMUSCULAR ONCE
Status: COMPLETED | OUTPATIENT
Start: 2021-01-04 | End: 2021-01-04

## 2021-01-04 RX ORDER — 0.9 % SODIUM CHLORIDE 0.9 %
1000 INTRAVENOUS SOLUTION INTRAVENOUS ONCE
Status: COMPLETED | OUTPATIENT
Start: 2021-01-04 | End: 2021-01-04

## 2021-01-04 RX ORDER — POLYETHYLENE GLYCOL 3350 17 G/17G
17 POWDER, FOR SOLUTION ORAL DAILY PRN
Status: DISCONTINUED | OUTPATIENT
Start: 2021-01-04 | End: 2021-01-13 | Stop reason: HOSPADM

## 2021-01-04 RX ORDER — DEXTROSE MONOHYDRATE 50 MG/ML
100 INJECTION, SOLUTION INTRAVENOUS PRN
Status: DISCONTINUED | OUTPATIENT
Start: 2021-01-04 | End: 2021-01-13 | Stop reason: HOSPADM

## 2021-01-04 RX ORDER — PANTOPRAZOLE SODIUM 40 MG/1
40 TABLET, DELAYED RELEASE ORAL
Status: DISCONTINUED | OUTPATIENT
Start: 2021-01-05 | End: 2021-01-13 | Stop reason: HOSPADM

## 2021-01-04 RX ORDER — MORPHINE SULFATE 4 MG/ML
4 INJECTION, SOLUTION INTRAMUSCULAR; INTRAVENOUS ONCE
Status: COMPLETED | OUTPATIENT
Start: 2021-01-04 | End: 2021-01-04

## 2021-01-04 RX ORDER — ONDANSETRON 2 MG/ML
4 INJECTION INTRAMUSCULAR; INTRAVENOUS ONCE
Status: COMPLETED | OUTPATIENT
Start: 2021-01-04 | End: 2021-01-04

## 2021-01-04 RX ORDER — MORPHINE SULFATE 2 MG/ML
INJECTION, SOLUTION INTRAMUSCULAR; INTRAVENOUS
Status: COMPLETED
Start: 2021-01-04 | End: 2021-01-04

## 2021-01-04 RX ADMIN — MORPHINE SULFATE 2 MG: 2 INJECTION, SOLUTION INTRAMUSCULAR; INTRAVENOUS at 23:23

## 2021-01-04 RX ADMIN — CEFEPIME 2 G: 2 INJECTION, POWDER, FOR SOLUTION INTRAVENOUS at 23:48

## 2021-01-04 RX ADMIN — VANCOMYCIN HYDROCHLORIDE 1500 MG: 5 INJECTION, POWDER, LYOPHILIZED, FOR SOLUTION INTRAVENOUS at 18:00

## 2021-01-04 RX ADMIN — SODIUM CHLORIDE, PRESERVATIVE FREE 10 ML: 5 INJECTION INTRAVENOUS at 20:39

## 2021-01-04 RX ADMIN — LISINOPRIL AND HYDROCHLOROTHIAZIDE 1 TABLET: 12.5; 2 TABLET ORAL at 20:39

## 2021-01-04 RX ADMIN — ASPIRIN 81 MG CHEWABLE TABLET 81 MG: 81 TABLET CHEWABLE at 20:39

## 2021-01-04 RX ADMIN — SODIUM CHLORIDE 1000 ML: 9 INJECTION, SOLUTION INTRAVENOUS at 16:18

## 2021-01-04 RX ADMIN — LORAZEPAM 2 MG: 2 INJECTION INTRAMUSCULAR at 23:39

## 2021-01-04 RX ADMIN — CEFEPIME HYDROCHLORIDE 2 G: 2 INJECTION, POWDER, FOR SOLUTION INTRAVENOUS at 16:18

## 2021-01-04 RX ADMIN — LORAZEPAM 2 MG: 2 INJECTION, SOLUTION INTRAMUSCULAR; INTRAVENOUS at 23:39

## 2021-01-04 RX ADMIN — MORPHINE SULFATE 4 MG: 4 INJECTION, SOLUTION INTRAMUSCULAR; INTRAVENOUS at 16:19

## 2021-01-04 RX ADMIN — ONDANSETRON 4 MG: 2 INJECTION INTRAMUSCULAR; INTRAVENOUS at 16:19

## 2021-01-04 ASSESSMENT — PAIN DESCRIPTION - LOCATION: LOCATION: FOOT

## 2021-01-04 ASSESSMENT — PAIN SCALES - GENERAL: PAINLEVEL_OUTOF10: 4

## 2021-01-04 ASSESSMENT — PAIN DESCRIPTION - ORIENTATION: ORIENTATION: RIGHT

## 2021-01-04 NOTE — ED PROVIDER NOTES
EMERGENCY DEPARTMENT ENCOUNTER      PCP: Tu Brice MD    [facility]    Chief Complaint   Patient presents with    Wound Check       This patient was not evaluated by the attending physician. I have independently evaluated this patient. HPI    Cherie Verde is a 36 y.o. male who presents with increasing redness to right foot. Onset 1 week ago. Patient has wound to right foot which she follows with wound care, states he has not followed with since around [place]. Patient is not currently on any antibiotics. Patient follows with podiatrist Dr. Wyatt Fonseca. Patient states he woke up from a nap today and there was bleeding from the wound. Patient has mainly worsening pain to right lower leg. Patient denies fever, chest pain or shortness of breath.       REVIEW OF SYSTEMS    Constitutional: denies fever  Respiratory:  No cough or shortness of breath   Cardiovascular:  No chest pain  GI: No nausea, vomiting  Musculoskeletal:  See HPI   Skin:  See HPI      All other review of systems are negative  See HPI and nursing notes for additional information     PAST MEDICAL HISTORY/SURGICAL HISTORY     Past Medical History:   Diagnosis Date    Callus of foot     Right foot - see's Dr. Noble Apodaca Dizziness     positional    Essential hypertension     Follows with PCP    Lumbar radiculopathy      Past Surgical History:   Procedure Laterality Date    [facility]AC [facility]TER[facility]  03/2018    [facility]    DENTAL SURGERY      teeth extractions -half per patient    HERNIA REPAIR Bilateral 5/27/2020    [address] performed by Floria Closs, MD at [address] [place] [address]  2018    AR OFFICE/OUTPT VISIT,PROCEDURE ONLY Left 4/17/2018    L5-S1 HEMILAMINECTOMY, REMOVAL OF DISC LEFT SIDE performed by Salma De Guzman MD at [facility]    Current Outpatient Rx   Medication Sig Dispense Refill    azithromycin ([place]) 250 MG tablet Take 1 tablet by mouth See Admin Instructions for 5 days 500mg on day 1 followed by 250mg on days 2 - 5 6 tablet 0    sulfamethoxazole-trimethoprim (BACTRIM DS;SEPTRA DS) 800-160 MG per tablet Take 1 tablet by mouth 2 times daily for 10 days (Patient not taking: Reported on 12/24/2020) 20 tablet 0    pioglitazone (ACTOS) 15 MG tablet Take 1 tablet by mouth daily 30 tablet 5    omeprazole (PRILOSEC) 20 MG delayed release capsule TAKE 1 CAPSULE BY MOUTH ONCE DAILY IN THE MORNING 90 capsule 1    lisinopril-hydroCHLOROthiazide (PRINZIDE;ZESTORETIC) 20-12.5 MG per tablet TAKE 1 TABLET BY MOUTH ONCE DAILY 90 tablet 1    metFORMIN (GLUCOPHAGE) 500 MG tablet Take 2 tablets by mouth 2 times daily (with meals) 360 tablet 1    glyBURIDE (DIABETA) 5 MG tablet Take 2 tablets by mouth 2 times daily (with meals) 360 tablet 1    blood glucose monitor kit and supplies Dispense sufficient amount for indicated testing frequency plus additional to accommodate PRN testing needs. Dispense all needed supplies to include: monitor, strips, lancing device, lancets, control solutions, alcohol swabs. 1 kit 0    blood glucose monitor strips Test 2 times a day & as needed for symptoms of irregular blood glucose. Dispense sufficient amount for indicated testing frequency plus additional to accommodate PRN testing needs.  100 strip 0    FreeStyle Lancets MISC 1 each by Does not apply route daily 100 each 3    aspirin 81 MG tablet Take 81 mg by mouth daily      acetaminophen (TYLENOL) 325 MG tablet Take 2 tablets by mouth every 4 hours as needed for Pain or Fever 120 tablet 3       ALLERGIES    No Known Allergies    FAMILY HISTORY/SOCIAL HISTORY    Family History   Problem Relation Age of Onset    Heart Disease Father     Diabetes Father     Diabetes Mother      Social History     Socioeconomic History    Marital status: Single     Spouse name: None    Number of children: None    Years of education: None    Highest education level: None   Occupational History    None   Social Needs    Financial resource strain: None    Food insecurity     Worry: None     Inability: None    Transportation needs     Medical: None     Non-medical: None   Tobacco Use    Smoking status: Never Smoker    Smokeless tobacco: Never Used   Substance and Sexual Activity    Alcohol use: Yes     Comment: \"couple beers a night\"    Drug use: No    Sexual activity: Yes     Partners: Female   Lifestyle    Physical activity     Days per week: None     Minutes per session: None    Stress: None   Relationships    Social connections     Talks on phone: None     Gets together: None     Attends Gnosticism service: None     Active member of club or organization: None     Attends meetings of clubs or organizations: None     Relationship status: None    Intimate partner violence     Fear of current or ex partner: None     Emotionally abused: None     Physically abused: None     Forced sexual activity: None   Other Topics Concern    None   Social History Narrative    None       PHYSICAL EXAM    VITAL SIGNS: BP (!) 144/102   Pulse 120   Temp 98 °F (36.7 °C) (Oral)   Resp 18   SpO2 100%    Constitutional:  Well developed, Well nourished  HENT:  Atraumatic, Normocephalic  Respiratory:  No retractions, lungs are clear   Cardiovascular: Tachycardic, normal rhythm  GI:  Soft, No tenderness   Musculoskeletal: Right foot with moderate swelling and erythema there is a wound along plantar aspect with no drainage. Blood noted from base of third toe. No active bleeding during my exam.  Distal capillary refill intact. Dorsalis pedis intact. Integument:  Right foot with moderate swelling and erythema there is a wound along plantar aspect with no drainage. Blood noted from base of third toe. No active bleeding during my exam.  Distal capillary refill intact. Dorsalis pedis intact.   Vascular: Dorsalis pedis pulses intact  Neurologic:  Alert & oriented, no slurred speech. IMAGING:  XR FOOT RIGHT (MIN 3 VIEWS)   Final Result   Erosion of the distal 3rd metatarsal and the proximal aspect of the 3rd toe   proximal phalanx, compatible with osteomyelitis. Labs:    Results for orders placed or performed during the hospital encounter of 01/04/21   CBC with Auto Diff   Result Value Ref Range    WBC 7.4 4.0 - 10.5 K/CU MM    RBC 4.21 (L) 4.6 - 6.2 M/CU MM    Hemoglobin 12.8 (L) 13.5 - 18.0 GM/DL    Hematocrit 39.5 (L) 42 - 52 %    MCV 93.8 78 - 100 FL    MCH 30.4 27 - 31 PG    MCHC 32.4 32.0 - 36.0 %    RDW 11.5 (L) 11.7 - 14.9 %    Platelets 599 (H) 033 - 440 K/CU MM    MPV 9.0 7.5 - 11.1 FL    Differential Type AUTOMATED DIFFERENTIAL     Segs Relative 53.2 36 - 66 %    Lymphocytes % 34.8 24 - 44 %    Monocytes % 9.5 (H) 0 - 4 %    Eosinophils % 1.5 0 - 3 %    Basophils % 0.7 0 - 1 %    Segs Absolute 3.9 K/CU MM    Lymphocytes Absolute 2.6 K/CU MM    Monocytes Absolute 0.7 K/CU MM    Eosinophils Absolute 0.1 K/CU MM    Basophils Absolute 0.1 K/CU MM    Nucleated RBC % 0.0 %    Total Nucleated RBC 0.0 K/CU MM    Total Immature Neutrophil 0.02 K/CU MM    Immature Neutrophil % 0.3 0 - 0.43 %   Comprehensive Metabolic Panel   Result Value Ref Range    Sodium 136 135 - 145 MMOL/L    Potassium 4.1 3.5 - 5.1 MMOL/L    Chloride 98 (L) 99 - 110 mMol/L    CO2 26 21 - 32 MMOL/L    BUN 7 6 - 23 MG/DL    CREATININE 0.7 (L) 0.9 - 1.3 MG/DL    Glucose 168 (H) 70 - 99 MG/DL    Calcium 8.9 8.3 - 10.6 MG/DL    Alb 3.8 3.4 - 5.0 GM/DL    Total Protein 9.5 (H) 6.4 - 8.2 GM/DL    Total Bilirubin 0.4 0.0 - 1.0 MG/DL    ALT 16 10 - 40 U/L    AST 18 15 - 37 IU/L    Alkaline Phosphatase 58 40 - 128 IU/L    GFR Non-African American >60 >60 mL/min/1.73m2    GFR African American >60 >60 mL/min/1.73m2    Anion Gap 12 4 - 16   Lactic Acid, Plasma   Result Value Ref Range    Lactate 1.9 0.4 - 2.0 mMOL/L           ED COURSE & MEDICAL DECISION MAKING    Patient presents as above.   Patient appears to have infected foot wound. IV vancomycin and cefepime as ordered. IV fluids, Zofran, morphine as ordered. Consult to patient's podiatrist Dr. Guille Cristobal who agrees with plan for admission and MRI if possible. CBC shows hemoglobin 12.8 hematocrit of 39.5. CMP grossly within normal limits. Lactic acid is 1.9. Right foot x-ray shows abrasion of the distal third metatarsal proximal aspect of third toe proximal phalanx compatible with osteomyelitis, no soft tissue gas. I discussed results with patient today. Consult hospitalist who accepts admission. Clinical  IMPRESSION    Diabetic foot infection  Osteomyelitis of right foot, unspecified type    Patient admitted    Comment: Please note this report has been produced using speech recognition software and may contain errors related to that system including errors in grammar, punctuation, and spelling, as well as words and phrases that may be inappropriate. If there are any questions or concerns please feel free to contact the dictating provider for clarification.       Silvia Lilly PA-C  01/04/21 [id number]

## 2021-01-05 ENCOUNTER — HOSPITAL ENCOUNTER (OUTPATIENT)
Dept: WOUND CARE | Age: 41
Discharge: HOME | End: 2021-01-05

## 2021-01-05 ENCOUNTER — TELEPHONE (OUTPATIENT)
Dept: FAMILY MEDICINE CLINIC | Age: 41
End: 2021-01-05

## 2021-01-05 ENCOUNTER — APPOINTMENT (OUTPATIENT)
Dept: ULTRASOUND IMAGING | Age: 41
DRG: 305 | End: 2021-01-05
Payer: COMMERCIAL

## 2021-01-05 LAB
ALBUMIN SERPL-MCNC: 3.4 GM/DL (ref 3.4–5)
ALP BLD-CCNC: 50 IU/L (ref 40–129)
ALT SERPL-CCNC: 12 U/L (ref 10–40)
ANION GAP SERPL CALCULATED.3IONS-SCNC: 8 MMOL/L (ref 4–16)
ANION GAP SERPL CALCULATED.3IONS-SCNC: 9 MMOL/L (ref 4–16)
AST SERPL-CCNC: 15 IU/L (ref 15–37)
BASOPHILS ABSOLUTE: 0 K/CU MM
BASOPHILS RELATIVE PERCENT: 0.7 % (ref 0–1)
BILIRUB SERPL-MCNC: 0.4 MG/DL (ref 0–1)
BUN BLDV-MCNC: 4 MG/DL (ref 6–23)
BUN BLDV-MCNC: 4 MG/DL (ref 6–23)
CALCIUM SERPL-MCNC: 8.5 MG/DL (ref 8.3–10.6)
CALCIUM SERPL-MCNC: 8.9 MG/DL (ref 8.3–10.6)
CHLORIDE BLD-SCNC: 96 MMOL/L (ref 99–110)
CHLORIDE BLD-SCNC: 97 MMOL/L (ref 99–110)
CO2: 24 MMOL/L (ref 21–32)
CO2: 28 MMOL/L (ref 21–32)
CREAT SERPL-MCNC: 0.7 MG/DL (ref 0.9–1.3)
CREAT SERPL-MCNC: 0.8 MG/DL (ref 0.9–1.3)
D DIMER: 329 NG/ML(DDU)
DIFFERENTIAL TYPE: ABNORMAL
EKG ATRIAL RATE: 107 BPM
EKG DIAGNOSIS: NORMAL
EKG P AXIS: 32 DEGREES
EKG P-R INTERVAL: 142 MS
EKG Q-T INTERVAL: 352 MS
EKG QRS DURATION: 90 MS
EKG QTC CALCULATION (BAZETT): 469 MS
EKG R AXIS: 23 DEGREES
EKG T AXIS: -2 DEGREES
EKG VENTRICULAR RATE: 107 BPM
EOSINOPHILS ABSOLUTE: 0.1 K/CU MM
EOSINOPHILS RELATIVE PERCENT: 1.4 % (ref 0–3)
GFR AFRICAN AMERICAN: >60 ML/MIN/1.73M2
GFR AFRICAN AMERICAN: >60 ML/MIN/1.73M2
GFR NON-AFRICAN AMERICAN: >60 ML/MIN/1.73M2
GFR NON-AFRICAN AMERICAN: >60 ML/MIN/1.73M2
GLUCOSE BLD-MCNC: 104 MG/DL (ref 70–99)
GLUCOSE BLD-MCNC: 108 MG/DL (ref 70–99)
GLUCOSE BLD-MCNC: 121 MG/DL (ref 70–99)
GLUCOSE BLD-MCNC: 127 MG/DL (ref 70–99)
GLUCOSE BLD-MCNC: 161 MG/DL (ref 70–99)
GLUCOSE BLD-MCNC: 91 MG/DL (ref 70–99)
HCT VFR BLD CALC: 37.8 % (ref 42–52)
HEMOGLOBIN: 12.2 GM/DL (ref 13.5–18)
IMMATURE NEUTROPHIL %: 0.2 % (ref 0–0.43)
LYMPHOCYTES ABSOLUTE: 1.8 K/CU MM
LYMPHOCYTES RELATIVE PERCENT: 30.2 % (ref 24–44)
MCH RBC QN AUTO: 30 PG (ref 27–31)
MCHC RBC AUTO-ENTMCNC: 32.3 % (ref 32–36)
MCV RBC AUTO: 93.1 FL (ref 78–100)
MONOCYTES ABSOLUTE: 0.5 K/CU MM
MONOCYTES RELATIVE PERCENT: 8.4 % (ref 0–4)
NUCLEATED RBC %: 0 %
PDW BLD-RTO: 11.6 % (ref 11.7–14.9)
PLATELET # BLD: 499 K/CU MM (ref 140–440)
PMV BLD AUTO: 8.7 FL (ref 7.5–11.1)
POTASSIUM SERPL-SCNC: 4 MMOL/L (ref 3.5–5.1)
POTASSIUM SERPL-SCNC: 4.5 MMOL/L (ref 3.5–5.1)
PRO-BNP: 88.74 PG/ML
RBC # BLD: 4.06 M/CU MM (ref 4.6–6.2)
SEGMENTED NEUTROPHILS ABSOLUTE COUNT: 3.5 K/CU MM
SEGMENTED NEUTROPHILS RELATIVE PERCENT: 59.1 % (ref 36–66)
SODIUM BLD-SCNC: 129 MMOL/L (ref 135–145)
SODIUM BLD-SCNC: 133 MMOL/L (ref 135–145)
TOTAL IMMATURE NEUTOROPHIL: 0.01 K/CU MM
TOTAL NUCLEATED RBC: 0 K/CU MM
TOTAL PROTEIN: 7.8 GM/DL (ref 6.4–8.2)
TROPONIN T: <0.01 NG/ML
WBC # BLD: 5.9 K/CU MM (ref 4–10.5)

## 2021-01-05 PROCEDURE — 80048 BASIC METABOLIC PNL TOTAL CA: CPT

## 2021-01-05 PROCEDURE — 1200000000 HC SEMI PRIVATE

## 2021-01-05 PROCEDURE — 6360000002 HC RX W HCPCS: Performed by: STUDENT IN AN ORGANIZED HEALTH CARE EDUCATION/TRAINING PROGRAM

## 2021-01-05 PROCEDURE — 84484 ASSAY OF TROPONIN QUANT: CPT

## 2021-01-05 PROCEDURE — 94761 N-INVAS EAR/PLS OXIMETRY MLT: CPT

## 2021-01-05 PROCEDURE — 93010 ELECTROCARDIOGRAM REPORT: CPT | Performed by: INTERNAL MEDICINE

## 2021-01-05 PROCEDURE — 6360000002 HC RX W HCPCS: Performed by: NURSE PRACTITIONER

## 2021-01-05 PROCEDURE — 99211 OFF/OP EST MAY X REQ PHY/QHP: CPT

## 2021-01-05 PROCEDURE — 6370000000 HC RX 637 (ALT 250 FOR IP): Performed by: NURSE PRACTITIONER

## 2021-01-05 PROCEDURE — 80053 COMPREHEN METABOLIC PANEL: CPT

## 2021-01-05 PROCEDURE — 93925 LOWER EXTREMITY STUDY: CPT

## 2021-01-05 PROCEDURE — 36415 COLL VENOUS BLD VENIPUNCTURE: CPT

## 2021-01-05 PROCEDURE — 82962 GLUCOSE BLOOD TEST: CPT

## 2021-01-05 PROCEDURE — 83880 ASSAY OF NATRIURETIC PEPTIDE: CPT

## 2021-01-05 PROCEDURE — 2580000003 HC RX 258: Performed by: NURSE PRACTITIONER

## 2021-01-05 PROCEDURE — 85379 FIBRIN DEGRADATION QUANT: CPT

## 2021-01-05 PROCEDURE — 85025 COMPLETE CBC W/AUTO DIFF WBC: CPT

## 2021-01-05 RX ADMIN — SODIUM CHLORIDE, PRESERVATIVE FREE 10 ML: 5 INJECTION INTRAVENOUS at 08:16

## 2021-01-05 RX ADMIN — LORAZEPAM 2 MG: 2 INJECTION, SOLUTION INTRAMUSCULAR; INTRAVENOUS at 15:25

## 2021-01-05 RX ADMIN — ENOXAPARIN SODIUM 40 MG: 40 INJECTION SUBCUTANEOUS at 08:16

## 2021-01-05 RX ADMIN — CEFEPIME 2 G: 2 INJECTION, POWDER, FOR SOLUTION INTRAVENOUS at 08:16

## 2021-01-05 RX ADMIN — SODIUM CHLORIDE, PRESERVATIVE FREE 10 ML: 5 INJECTION INTRAVENOUS at 20:21

## 2021-01-05 RX ADMIN — PANTOPRAZOLE SODIUM 40 MG: 40 TABLET, DELAYED RELEASE ORAL at 05:25

## 2021-01-05 RX ADMIN — VANCOMYCIN HYDROCHLORIDE 1500 MG: 5 INJECTION, POWDER, LYOPHILIZED, FOR SOLUTION INTRAVENOUS at 17:47

## 2021-01-05 RX ADMIN — CEFEPIME 2 G: 2 INJECTION, POWDER, FOR SOLUTION INTRAVENOUS at 15:25

## 2021-01-05 RX ADMIN — ASPIRIN 81 MG CHEWABLE TABLET 81 MG: 81 TABLET CHEWABLE at 08:16

## 2021-01-05 RX ADMIN — LISINOPRIL AND HYDROCHLOROTHIAZIDE 1 TABLET: 12.5; 2 TABLET ORAL at 09:36

## 2021-01-05 ASSESSMENT — PAIN SCALES - GENERAL
PAINLEVEL_OUTOF10: 0
PAINLEVEL_OUTOF10: 0

## 2021-01-05 NOTE — PROGRESS NOTES
[id number] [facility] Sal[facility] Liaison spoke with pt and pt is agreeable to c at discharge.  Please place inpatient consult to home health needs order in [facility] [place].

## 2021-01-05 NOTE — CONSULTS
PHARMACY [facility] HOSPITAL [facility] SERVICE    Darryl Inman is a 36 y.o. male started on vancomycin for diabetic foot ulcer with osteomyelitis. Pharmacy consulted by Dr. Germain Shone / NP Amy Hall for monitoring and adjustment. Indication for treatment: Diabetic foot ulcer with osteomyelitis  Goal trough: 15 mcg/mL  Other antimicrobials: Cefepime    Ht Readings from Last 1 Encounters:   01/04/21 6' (1.829 m)     Wt Readings from Last 3 Encounters:   01/04/21 175 lb (79.4 kg)   12/15/20 175 lb (79.4 kg)   09/23/20 181 lb (82.1 kg)        Pertinent Laboratory Values:   Temp Readings from Last 3 Encounters:   01/05/21 98.8 °F (37.1 °C) (Oral)   12/24/20 97.1 °F (36.2 °C)   12/22/20 97.8 °F (36.6 °C) (Temporal)     Recent Labs     01/04/21  1600 01/05/21  0821   WBC 7.4 5.9   LACTATE 1.9  --      Recent Labs     01/04/21  1600 01/05/21  0158 01/05/21  0821   BUN 7 4* 4*   CREATININE 0.7* 0.7* 0.8*     Estimated Creatinine Clearance: 135 mL/min (A) (based on SCr of 0.8 mg/dL (L)). Intake/Output Summary (Last 24 hours) at 1/5/2021 1259  Last data filed at 1/5/2021 0816  Gross per 24 hour   Intake 10 ml   Output --   Net 10 ml       Pertinent Cultures:  Date    Source    Results  1/4/21   Blood    Collected  1/4/21   Wound    Collected    Previous vancomycin levels:  TROUGH:  No results for input(s): VANCOTROUGH in the last 72 hours. RANDOM:  No results for input(s): VANCORANDOM in the last 72 hours. Assessment/Plan:  · Renal , same/ stable, good, crcl= 135. I/O net + 10ml ? Documentation. · WBC= WNL. · Day 2 of vanco iv. · CONTINUE SAME DOSE AND FREQUENCY=  vancomycin 1,500mg q12h   · Pharmacy will continue to monitor patient and adjust therapy as indicated    Frieda 1/6/2021 @ 5391    Thank you for the consult.   Seble Su Bakari [address], [facility]  1/5/2021 12:59 PM

## 2021-01-05 NOTE — CONSULTS
[address] [place] [address], [address] [facility]                                  CONSULTATION    PATIENT NAME: Anshu Jackson                      :        1980  MED REC NO:   [id number]                          ROOM:       4115  ACCOUNT NO:   [de-identified]                           ADMIT DATE: 2021  PROVIDER:     Tha Cortez DPM    CONSULT DATE:  2021    HISTORY OF PRESENT ILLNESS:  The patient is a very pleasant 49-year-old  gentleman that was admitted to the hospital with diabetic ulceration and  infection associated to the right lower extremity. I was consulted by  the ER because he is a known patient to our practice that we have been  following him for some time. The patient seen at bedside today during  his Doppler and denies any nausea, vomiting, fever or chills. He states  that he has noticed the swelling increased over the last several days  and was concerned that it was getting worse. The patient subsequently  was admitted and I was consulted by the ED. PAST MEDICAL HISTORY:  Well known and reviewed. LABORATORY DATA:  Currently his white count is 5.9, glucose was 121. PHYSICAL EXAMINATION:  VITAL SIGNS:  The patient's temperature is 98.8 per vitals, respiration  18, pulse is 94, blood pressure 128/82. PODIATRIC:  Examination shows that he has dopplerable pulses DP, PT in  the _____ bilateral.  The patient did have palpable pulses. The patient  does have significant swelling associated to the right lower extremity  at the distal forefoot. The patient has an ulceration at this time that  is approximately 1.2 cm in length and 8 mm in width and 2-4 mm depth. There is warmth associated to the site. There is discoloration as far  as redness. There is no purulence noted this time. There is no deep  probe to bone. There is no odor present to the site.   There is local increase in temperature. The patient does have tight shiny, dry  atrophic skin changes as well. NEUROLOGIC:  Diminished to epicritic sensation, light touch and  vibration as noted previously through our practice. MUSCULOSKELETAL:  The patient does have significant contracture to the  digits 1, 2, 3, 4 and 5. IMAGING STUDIES:  The patient's x-ray evaluation showed changes  consistent with that of osteomyelitis associated to the third metatarsal  head and also the proximal phalangeal base of the third. The patient  also showed the significant digital contractures. ASSESSMENT:  At this time;  1.  Osteomyelitis of the right foot third metatarsal third toe.  2.  Ulceration with necrosis to bone on the right. 3.  Diabetic with neuropathy. 4.  Possible PVD. TREATMENT:  The patient this time is educated about his condition. I  already ordered an MRI and he is currently getting his Doppler and has a  consultation with Dr. Zelda Atkins. Dr. Zelda Atkins is going to let me know how he  thinks his potential to heal is and if needed further vascular  intervention. We will wait on the MRI report. We will continue to do a  dry dressing at this time. He is on appropriate antibiotic therapy. The patient is to be nonweightbearing. I did talk to him about doing a  transmetatarsal amputation with a PING pending that he has the vascular  supply that would leave him a good chance of healing the procedure. I  went over this with him. He would need extensive rehabilitation. He  would need to end up getting a diabetic shoe with a space filler down  the line as well. We will put all of our information together and I did  talk to the hospitalist earlier today as well and looked at the previous  cardiac concern and was no more then an anxiety that is what they will  believe at this point, so we will get all of the rest of our information  together and then formulate our plan going forward as described above. If you have any questions, please give me a call at [phone].         Nuris Goldstein DPM    D: 01/05/2021 12:52:15       T: 01/05/2021 12:59:47     [email or web address]  Job#: [id number]     Doc#: [id number]    CC:

## 2021-01-05 NOTE — CARE COORDINATION
Reviewed chart and spoke with pt about discharge needs/plans. Pt lives with his mother, PTA he was independent with ADL's and transportation. He was using the [facility] for wound care. Pt has a PCP and sees Dr Garrett Haddad for his foot wound. Insurance covers medications. We discussed possible need for AVS vs home with [facility]. for therapy and possible iv atb. Pt is open to either one and would like [facility] when discharged to home. PS sent to [facility] and vm left for [place] with [facility].

## 2021-01-05 NOTE — ED NOTES
Called and gave report to Bee Romero on [address] [facility]; pt will be going to room [facility].  [facility]  01/04/21 1935

## 2021-01-05 NOTE — PROGRESS NOTES
ALKPHOS 50 01/05/2021    AST 15 01/05/2021    ALT 12 01/05/2021     Recent Labs     01/05/21  0158 01/05/21  0821   TROPONINT <0.010 <0.010     Lab Results   Component Value Date    [facility] 2.640 02/22/2018         dextrose        aspirin  81 mg Oral Daily    lisinopril-hydroCHLOROthiazide  1 tablet Oral Daily    pantoprazole  40 mg Oral QAM AC    insulin lispro  0-12 Units Subcutaneous TID WC    insulin lispro  0-6 Units Subcutaneous Nightly    sodium chloride flush  10 mL Intravenous 2 times per day    enoxaparin  40 mg Subcutaneous Daily    cefepime  2 g Intravenous Q8H    vancomycin  1,500 mg Intravenous Q12H         Assessment:       Patient Active Problem List    Diagnosis Date Noted    Diabetic foot ulcer with osteomyelitis ([facility] 01/04/2021    WD-Cellulitis of foot right 12/22/2020    Diabetic ulcer of right midfoot associated with type 2 diabetes mellitus, with fat layer exposed ([facility] 08/11/2020    Septic embolism ([facility] 06/13/2020    Gastroesophageal reflux disease without esophagitis 05/11/2020    Type 2 diabetes mellitus with diabetic neuropathy, without long-term current use of insulin ([facility]     Essential hypertension     Lumbar radiculopathy     S/P lumbar laminectomy 04/18/2018    Lumbar back pain with radiculopathy affecting left lower extremity 04/17/2018    Unstable angina ([facility] 03/13/2018       Plan:     Problems being addressed this admission:     Right foot ulcer osteomyelitis  1/4/21-Diabetic foot ulcer with osteomyelitis - as per XR, no fever, hemodynamically stable. -known h/o right midfoot diabetic ulcer and f/u at wound care center -Podiatry surgeon, Dr. Miguel Palmer consulted in ED-empiric antibiotics: Cefepime + Vanc (pharm to dose)-elevated leg, NV checks-control  pain-wound care consulted-check lab works in AM-pending blood/wound cx, MRSA screening  1/5/21- to have u/s arterial and possible angio right leg as per vascular surgery. On vanco and maxipime.      HTN  1/4/21-Hypertension, uncontrolled - could be nonadherent to treatment vs r/t pain-control pain-cont lisinopril-hydrochlorothiazide-monitor BP trends   1/5/21- b/p is 128/82 today continue to monitor.        DM 2  1/4/21-DM type 2 - Last A1C 8.7-Sliding scale-monitor accucheck-diabetic diet-pending HgbA1C  1/5/21- sugar is 104 today. sodium is 133 will monitor.        Consultants:  Podiatry  Vascular surgery    General Orders:  Repeat basic labs again in am.  I have explained to the patient and discussed with him/her the treatment plan.   Francine Funes MD, [address]

## 2021-01-05 NOTE — CONSULTS
[facility] Continence Nurse  Consult Note       Edna Haynes  AGE: 36 y.o. GENDER: male  : 1980  TODAY'S DATE:  2021    Subjective:     Reason for  Evaluation and Assessment: wound assessment      Edna Haynes is a 36 y.o. male referred by:   [x] Physician  [] Nursing  [] Other:     Wound Identification:  Wound Type: diabetic  Contributing Factors: diabetes and poor glucose control        PAST MEDICAL HISTORY        Diagnosis Date    Callus of foot     Right foot - see's Dr. Dorothea Ellison Dizziness     positional    Essential hypertension     Follows with PCP    Lumbar radiculopathy        PAST SURGICAL HISTORY    Past Surgical History:   Procedure Laterality Date    [facility]  2018    [facility]    DENTAL SURGERY      teeth extractions -half per patient    HERNIA REPAIR Bilateral 2020    BIALTERAL HERNIA INGUINAL REPAIR performed by Juancarlos Rodriguez MD at [address] [place] [address]    [place] OFFICE/OUTPT VISIT,PROCEDURE ONLY Left 2018    L5-S1 HEMILAMINECTOMY, REMOVAL OF DISC LEFT SIDE performed by Jewels Esposito MD at [address] [place] [address]    Family History   Problem Relation Age of Onset    Heart Disease Father     Diabetes Father     Diabetes Mother        SOCIAL HISTORY    Social History     Tobacco Use    Smoking status: Never Smoker    Smokeless tobacco: Never Used   Substance Use Topics    Alcohol use: Yes     Comment: \"couple beers a night\"    Drug use: No       ALLERGIES    No Known Allergies    MEDICATIONS    No current facility-administered medications on file prior to encounter.       Current Outpatient Medications on File Prior to Encounter   Medication Sig Dispense Refill    azithromycin (ZITHROMAX) 250 MG tablet Take 1 tablet by mouth See Admin Instructions for 5 days 500mg on day 1 followed by 250mg on days 2 - 5 6 tablet 0    pioglitazone (ACTOS) 15 MG tablet Take 1 tablet by mouth daily 30 tablet 5    omeprazole (PRILOSEC) 20 MG delayed release capsule TAKE 1 CAPSULE BY MOUTH ONCE DAILY IN THE MORNING 90 capsule 1    lisinopril-hydroCHLOROthiazide (PRINZIDE;ZESTORETIC) 20-12.5 MG per tablet TAKE 1 TABLET BY MOUTH ONCE DAILY 90 tablet 1    metFORMIN (GLUCOPHAGE) 500 MG tablet Take 2 tablets by mouth 2 times daily (with meals) 360 tablet 1    glyBURIDE (DIABETA) 5 MG tablet Take 2 tablets by mouth 2 times daily (with meals) 360 tablet 1    blood glucose monitor kit and supplies Dispense sufficient amount for indicated testing frequency plus additional to accommodate PRN testing needs. Dispense all needed supplies to include: monitor, strips, lancing device, lancets, control solutions, alcohol swabs. 1 kit 0    blood glucose monitor strips Test 2 times a day & as needed for symptoms of irregular blood glucose. Dispense sufficient amount for indicated testing frequency plus additional to accommodate PRN testing needs.  100 strip 0    FreeStyle Lancets MISC 1 each by Does not apply route daily 100 each 3    aspirin 81 MG tablet Take 81 mg by mouth daily      acetaminophen (TYLENOL) 325 MG tablet Take 2 tablets by mouth every 4 hours as needed for Pain or Fever 120 tablet 3    sulfamethoxazole-trimethoprim (BACTRIM DS;SEPTRA DS) 800-160 MG per tablet Take 1 tablet by mouth 2 times daily for 10 days (Patient not taking: Reported on 12/24/2020) 20 tablet 0         Objective:      /82   Pulse 94   Temp 98.8 °F (37.1 °C) (Oral)   Resp 18   Ht 6' (1.829 m)   Wt 175 lb (79.4 kg)   SpO2 99%   BMI 23.73 kg/m²   Heraclio Risk Score: Heraclio Scale Score: 20    LABS    CBC:   Lab Results   Component Value Date    WBC 5.9 01/05/2021    RBC 4.06 01/05/2021    HGB 12.2 01/05/2021    HCT 37.8 01/05/2021    MCV 93.1 01/05/2021    MCH 30.0 01/05/2021    MCHC 32.3 01/05/2021    RDW 11.6 01/05/2021     01/05/2021    MPV 8.7 01/05/2021     CMP:    Lab Results   Component Value Date     01/05/2021    K 4.5 01/05/2021    K 3.8 03/14/2018    CL 96 01/05/2021    CO2 28 01/05/2021    BUN 4 01/05/2021    CREATININE 0.8 01/05/2021    GFRAA >60 01/05/2021    AGRATIO 1.4 03/12/2020    LABGLOM >60 01/05/2021    GLUCOSE 121 01/05/2021    PROT 7.8 01/05/2021    LABALBU 3.4 01/05/2021    CALCIUM 8.9 01/05/2021    BILITOT 0.4 01/05/2021    ALKPHOS 50 01/05/2021    AST 15 01/05/2021    ALT 12 01/05/2021     Albumin:    Lab Results   Component Value Date    LABALBU 3.4 01/05/2021     PT/INR:    Lab Results   Component Value Date    PROTIME 12.3 06/14/2020    INR 1.02 06/14/2020     HgBA1c:    Lab Results   Component Value Date    LABA1C 7.8 01/04/2021         Assessment:     Patient Active Problem List   Diagnosis    Unstable angina (HCC)    Lumbar back pain with radiculopathy affecting left lower extremity    S/P lumbar laminectomy    Type 2 diabetes mellitus with diabetic neuropathy, without long-term current use of insulin (HCC)    Essential hypertension    Lumbar radiculopathy    Gastroesophageal reflux disease without esophagitis    Septic embolism ([address]    Diabetic ulcer of right midfoot associated with type 2 diabetes mellitus, with fat layer exposed ([address]    WD-Cellulitis of foot right    Diabetic foot ulcer with osteomyelitis ([address]       Measurements:  Incision 04/17/18 Back (Active)   Number of days: 994       Wound 06/13/20 Tibial Right pt states reddened and rash like after a sunburn 6/11/2020 (Active)   Number of days: 206       Wound 12/15/20 Right;Plantar # 1 (Active)   Wound Image   12/15/20 0943   Wound Etiology Diabetic 01/05/21 1300   Dressing Status New dressing applied 01/05/21 1300   Wound Cleansed Cleansed with saline 01/05/21 1300   Dressing/Treatment ABD;Dry dressing;Roll gauze 01/05/21 0817   Wound Length (cm) 0.8 cm 01/05/21 1300   Wound Width (cm) 0.5 cm 01/05/21 1300   Wound Depth (cm) 1.4 cm 01/05/21 1300   Wound Surface Area (cm^2) 0.4 cm^2 01/05/21 1300   Change in Wound Size % (l*w) 50.62 01/05/21 1300   Wound Volume (cm^3) 0.56 cm^3 01/05/21 1300   Wound Healing % -40 01/05/21 1300   Post-Procedure Length (cm) 0.9 cm 12/22/20 0955   Post-Procedure Width (cm) 1 cm 12/22/20 0955   Post-Procedure Depth (cm) 0.6 cm 12/22/20 0955   Post-Procedure Surface Area (cm^2) 0.9 cm^2 12/22/20 0955   Post-Procedure Volume (cm^3) 0.54 cm^3 12/22/20 0955   Distance Tunneling (cm) 0 cm 01/05/21 1300   Tunneling Position ___ O'Clock 0 01/05/21 1300   Undermining Starts ___ O'Clock 0 01/05/21 1300   Undermining Ends___ O'Clock 0 01/05/21 1300   Undermining Maxium Distance (cm) 0 01/05/21 1300   Wound Assessment Pink/red 01/05/21 1300   Drainage Amount Small 01/05/21 1300   Drainage Description Serosanguinous 01/05/21 1300   Odor None 01/05/21 1300   Rosalie-wound Assessment Hyperkeratosis (callous) 01/05/21 1300   Margins Defined edges 01/05/21 1300   Wound Thickness Description not for Pressure Injury Full thickness 01/05/21 1300   Number of days: 21       Wound 01/05/21 Foot Right between 2nd and 3rd toes (Active)   Wound Etiology Diabetic 01/05/21 1300   Dressing Status New dressing applied 01/05/21 1300   Wound Cleansed Cleansed with saline 01/05/21 1300   Wound Length (cm) 0.7 cm 01/05/21 1300   Wound Width (cm) 0.7 cm 01/05/21 1300   Wound Depth (cm) 0.1 cm 01/05/21 1300   Wound Surface Area (cm^2) 0.49 cm^2 01/05/21 1300   Wound Volume (cm^3) 0.05 cm^3 01/05/21 1300   Distance Tunneling (cm) 0 cm 01/05/21 1300   Tunneling Position ___ O'Clock 0 01/05/21 1300   Undermining Starts ___ O'Clock 0 01/05/21 1300   Undermining Ends___ O'Clock 0 01/05/21 1300   Undermining Maxium Distance (cm) 0 01/05/21 1300   Wound Assessment Pink/red 01/05/21 1300   Drainage Amount Small 01/05/21 1300   Drainage Description Serosanguinous 01/05/21 1300   Odor None 01/05/21 1300   Rosalie-wound Assessment Maceration 01/05/21 1300   Margins Defined edges 01/05/21 1300   Wound Thickness Description not for Pressure Injury Full thickness 01/05/21 1300   Number of days: 0       Response to treatment:  Well tolerated by patient. Pain Assessment:  Severity:  none  Quality of pain: na  Wound Pain Timing/Severity: na  Premedicated: no    Plan:     Plan of Care: Wound 12/15/20 Right;Plantar # 1-Dressing/Treatment: (aquacel AG, abd, kerlix)  Wound 01/05/21 Foot Right between 2nd and 3rd toes-Dressing/Treatment: (aquacel AG, abd, kerlix)     Pt in bed. Agreeable to wound assessment. Active with outpatient wound clinic and Dr. Erin Bowie. Awaiting MRI. Dr. Lou Vazquez also following for evaluation of arterial disease. Notes reviewed. Wounds noted to right foot as above. Dried exudate on serenity wound. Washed with soap and water. Cleansed with NS. Pictures and measurements taken. Right foot warm with strong pedal pulse. Treatment applied as above. Left foot intact with plantar callous noted. Heels intact. Pt is generally not at risk for skin breakdown AEB Heraclio. Specialty Bed Required : no  [] Low Air Loss   [] Pressure Redistribution  [] Fluid Immersion  [] Bariatric  [] Total Pressure Relief  [] Other:     Discharge Plan:  Placement for patient upon discharge: tbd  Hospice Care: no  Patient appropriate for Outpatient [address] [facility] [address]: already active    Patient/Caregiver Teaching:  Level of patient/caregiver understanding able to:   Voiced understanding regarding wound care.         Electronically signed by Ac Vigil RN,  on 1/5/2021 at 2:00 PM

## 2021-01-05 NOTE — H&P
History and Physical  RAFAEL Lutz-BC   Internal Medicine Hospitalist      Name:  Vilma Moss /Age/Sex: 1980  (36 y.o. male)   MRN & CSN:  [id number] & [id number] Admission Date/Time: 2021  3:24 PM   Location:  ED36/ED-36 PCP: Sandra Chavez MD       Hospital Day: 1      Supervising Physician: Dr. Tavia Hoover     Chief Complaint: Wound Check     Assessment and Plan:   Vilma Moss is a 36 y.o. male who presents with Diabetic foot ulcer with osteomyelitis ([facility]     Diabetic foot ulcer with osteomyelitis - as per XR, no fever, hemodynamically stable. -known h/o right midfoot diabetic ulcer and f/u at wound care center        -Podiatry surgeon, Dr. Prakash Villegas consulted in ED       -empiric antibiotics: Cefepime + Vanc (pharm to dose)       -elevated leg, NV checks       -control  pain       -wound care consulted       -check lab works in AM       -pending blood/wound cx, MRSA screening     Hypertension, uncontrolled - could be nonadherent to treatment vs r/t pain       -control pain       -cont lisinopril-hydrochlorothiazide       -monitor BP trends      DM type 2 - Last A1C 8.7       -Sliding scale       -monitor accucheck       -diabetic diet       -pending HgbA1C     Chronic Illnesses:        -hyperlipidemia       -hypertension     Current diagnosis and plan of management discussed with the patient at the time of admission in lay language who agree to the above plan and disposition of admission for further care. All concerns and questions addressed.       Patient assessment and plan in conjunction with supervising physician - Dr. Wilber Hurtado;    DVT Prophylaxis [x] Lovenox, []  Heparin, [] SCDs, [] Ambulation  [] Long term AC   GI Prophylaxis [x] PPI,  [] H2 Blocker,  [] Carafate,  [] Diet,  [x] No GI prophylaxis, N/A: patient is not under significant medical stress, non-ICU or is receiving a diet/tube feeds   Code Status  Full CODE vision changes. No scleral erythema, discharge, or conjunctivitis. HENT -MM are moist. Oral pharynx without exudates, no evidence of thrush. NECK -Supple, no apparent thyromegaly or masses. RESP -LS CTA equal bilat, no wheezes, rales or rhonchi. Symmetric chest movement. No respiratory distress noted. C/V  -S1/S2 auscultated. RRR without appreciable M/R/G. No JVD or carotid bruits. Peripheral pulses equal bilaterally and palpable. Peripheral pulses equal bilaterally and palpable. No peripheral edema. GI  -Abdomen is soft, round, non-distended, no significant tenderness. No masses or guarding. + BS in all quadrants. Rectal exam deferred.   -[place] catheter is not present. LYMPH  -No palpable cervical lymphadenopathy and no hepatosplenomegaly. No petechiae or ecchymoses. MS  -B/L extremities strong muscles strength. Full movements. No gross joint deformities. No swelling, intact sensation symmetrical.   SKIN  -Normal coloration, warm, dry. Open right diabetic foot ulcers with dry blood around the wound. NEURO  -CN 2-12 appear grossly intact, normal speech, no lateralizing weakness. PSYC  -Awake, alert, oriented x 4. Appropriate affect. Past Medical History:      Past Medical History:   Diagnosis Date    Callus of foot     Right foot - see's Dr. Rosetta Davalos Dizziness     positional    Essential hypertension     Follows with PCP    Lumbar radiculopathy      Past Surgery History:  Patient  has a past surgical history that includes Cardiac catheterization (03/2018); [place] tooth extraction; Dental surgery; pr office/outpt visit,procedure only (Left, 4/17/2018); lumbar laminectomy (2018); and hernia repair (Bilateral, 5/27/2020). Social History:    FAM HX: Assessed: family history includes Diabetes in his father and mother; Heart Disease in his father.   Soc HX:   Social History     Socioeconomic History    Marital status: Single     Spouse name: None    Number of children: None    Years of education: None    Highest education level: None   Occupational History    None   Social Needs    Financial resource strain: None    Food insecurity     Worry: None     Inability: None    Transportation needs     Medical: None     Non-medical: None   Tobacco Use    Smoking status: Never Smoker    Smokeless tobacco: Never Used   Substance and Sexual Activity    Alcohol use: Yes     Comment: \"couple beers a night\"    Drug use: No    Sexual activity: Yes     Partners: Female   Lifestyle    Physical activity     Days per week: None     Minutes per session: None    Stress: None   Relationships    Social connections     Talks on phone: None     Gets together: None     Attends [employer] service: None     Active member of club or organization: None     Attends meetings of clubs or organizations: None     Relationship status: None    Intimate partner violence     Fear of current or ex partner: None     Emotionally abused: None     Physically abused: None     Forced sexual activity: None   Other Topics Concern    None   Social History Narrative    None     TOBACCO:   reports that he has never smoked. He has never used smokeless tobacco.  ETOH:   reports current alcohol use. Drugs:  reports no history of drug use. Allergies: No Known Allergies  Medications:   Medications:    vancomycin  1,500 mg Intravenous Once      Infusions:   PRN Meds:    Prior to Admission Meds:  Prior to Admission medications    Medication Sig Start Date End Date Taking?  Authorizing Provider   azithromycin (ZITHROMAX) 250 MG tablet Take 1 tablet by mouth See Admin Instructions for 5 days 500mg on day 1 followed by 250mg on days 2 - 5 12/31/20 1/5/21 Yes Nila Jimenez MD   pioglitazone (ACTOS) 15 MG tablet Take 1 tablet by mouth daily 10/1/20 1/4/21 Yes Nila Jimenez MD   omeprazole (PRILOSEC) 20 MG delayed release capsule TAKE 1 CAPSULE BY MOUTH ONCE DAILY IN THE MORNING 7/23/20  Yes Nila Jimenez MD lisinopril-hydroCHLOROthiazide (PRINZIDE;ZESTORETIC) 20-12.5 MG per tablet TAKE 1 TABLET BY MOUTH ONCE DAILY 7/23/20  Yes Charli Calvert MD   metFORMIN (GLUCOPHAGE) 500 MG tablet Take 2 tablets by mouth 2 times daily (with meals) 7/23/20 1/4/21 Yes Charli Calvert MD   glyBURIDE (DIABETA) 5 MG tablet Take 2 tablets by mouth 2 times daily (with meals) 7/23/20 1/4/21 Yes Charli Calvert MD   blood glucose monitor kit and supplies Dispense sufficient amount for indicated testing frequency plus additional to accommodate PRN testing needs. Dispense all needed supplies to include: monitor, strips, lancing device, lancets, control solutions, alcohol swabs. 6/15/20  Yes Keyon Berumen MD   blood glucose monitor strips Test 2 times a day & as needed for symptoms of irregular blood glucose. Dispense sufficient amount for indicated testing frequency plus additional to accommodate PRN testing needs. 6/15/20  Yes Keyon Berumen MD   FreeStyle Lancets [facility] 1 each by Does not apply route daily 6/15/20  Yes Keyon Berumen MD   aspirin 81 MG tablet Take 81 mg by mouth daily   Yes Historical Provider, MD   acetaminophen (TYLENOL) 325 MG tablet Take 2 tablets by mouth every 4 hours as needed for Pain or Fever 2/28/18  Yes Carlos A Huertas MD   sulfamethoxazole-trimethoprim (BACTRIM DS;SEPTRA DS) 800-160 MG per tablet Take 1 tablet by mouth 2 times daily for 10 days  Patient not taking: Reported on 12/24/2020 12/26/20 1/5/21  RAFAEL Rocha - CNP     Data:     Laboratory this visit:  Reviewed  Recent Labs     01/04/21  1600   WBC 7.4   HGB 12.8*   HCT 39.5*   *      Recent Labs     01/04/21  1600      K 4.1   CL 98*   CO2 26   BUN 7   CREATININE 0.7*     Recent Labs     01/04/21  1600   AST 18   ALT 16   BILITOT 0.4   ALKPHOS 58     No results for input(s): INR in the last 72 hours. No results for input(s): CKTOTAL, CKMB, CKMBINDEX in the last 72 hours.     Invalid input(s): TROPONIN1  Invalid input(s): PRO-BNP    Radiology this visit:  Reviewed. Xr Foot Right (2 Views)    Result Date: 12/22/2020  EXAMINATION: THREE XRAY VIEWS OF THE RIGHT FOOT 12/22/2020 11:07 am COMPARISON: None HISTORY: ORDERING SYSTEM PROVIDED HISTORY: Diabetic ulcer of right midfoot associated with type 2 diabetes mellitus, with fat layer exposed ([address] TECHNOLOGIST PROVIDED HISTORY: Reason for exam:->rukle out osteomyelitis Reason for Exam: rule out osteomyelitis Acuity: Acute Type of Exam: Initial FINDINGS: Three views of the foot demonstrate no acute fracture or dislocation. Lisfranc alignment is maintained. Significant hammertoe deformity is present. Normal bony mineralization. No suspicious osseous lesion. No significant degenerative changes. No soft tissue swelling. Atherosclerosis is present. 1. No acute osseous abnormality of the right foot. No radiographic evidence of osteomyelitis. 2. Significant hammertoe deformity. 3. No obvious soft tissue injury/ulceration. Xr Foot Right (min 3 Views)    Result Date: 1/4/2021  EXAMINATION: THREE XRAY VIEWS OF THE RIGHT FOOT 1/4/2021 1:18 pm COMPARISON: 12/22/2020 HISTORY: ORDERING SYSTEM PROVIDED HISTORY: pain ulcer TECHNOLOGIST PROVIDED HISTORY: Reason for exam:->pain ulcer Reason for Exam: pain ulcer Pain, ulceration. FINDINGS: Hammertoe deformities are noted involving all the digits. There is evidence erosion of the distal 3rd metatarsal and the proximal aspect of the 3rd toe proximal phalanx. Osteolysis has increased when compared to the previous exam. There is associated soft tissue swelling along the ball of the foot. No soft tissue gas is detected. On lateral examination, subtalar joint unremarkable. Erosion of the distal 3rd metatarsal and the proximal aspect of the 3rd toe proximal phalanx, compatible with osteomyelitis. EKG this visit:   EKG: No available ECG to review during assessment/interview. Please see ED notes.     Current Treatment Team:  Treatment Team: Attending Provider: Babak Spaulding MD; Registered Nurse: Sherman Fields; Physician Assistant: Denise Mariano PA-C; Consulting Physician: SANNA Cadena APRN-BC [facility]  1/4/2021 7:38 PM      Electronically signed by RAFAEL Bishop CNP on 1/4/2021 at 7:38 PM

## 2021-01-05 NOTE — CARE COORDINATION
Received potential eferral for ARU. Will review patients clinicals and PT/OT notes.   Thank you for the referral.

## 2021-01-05 NOTE — SIGNIFICANT EVENT
Significant event documentation    Time of event: 2320    Event: cp likely anxiety, r/o cp. Patient describes the chest pain as sternal, tight, nonradiating, and endorses severe anxiety. Patient endorses shortness of breath with chest pain. Patient states chest pain woke him out of sleep. Interventions: EKG, troponins, D-dimer, POCT glucose, CXR, 2 mg morphine, and 2 mg Ativan    Impressions: EKG was reviewed personally which showed sinus tach at 105 with no ST elevations noted. EKG looks similar to EKG done on 6/13/2020. Appears to be having panic attack. After 2 mg Ativan patient was feeling much better and chest pain resolved. Electronically signed by Herson Connors DO on 1/4/2021 at 11:39 PM    This dictation was created with voice recognition software. While attempts have been made to review the dictation as it is transcribed, on occasion the spoken word can be misinterpreted by the technology leading to omissions or inappropriate words, phrases or sentences.

## 2021-01-05 NOTE — CONSULTS
PHARMACY [facility] SERVICE    Ana Lilia Roca is a 36 y.o. male started on vancomycin for diabetic foot ulcer with osteomyelitis. Pharmacy consulted by Dr. Aylin Garay / NP Jhon Mast for monitoring and adjustment. Indication for treatment: Diabetic foot ulcer with osteomyelitis  Goal trough: 15 mcg/mL  Other antimicrobials: Cefepime    Ht Readings from Last 1 Encounters:   01/04/21 6' (1.829 m)     Wt Readings from Last 3 Encounters:   01/04/21 175 lb (79.4 kg)   12/15/20 175 lb (79.4 kg)   09/23/20 181 lb (82.1 kg)        Pertinent Laboratory Values:   Temp Readings from Last 3 Encounters:   01/04/21 99.1 °F (37.3 °C) (Oral)   12/24/20 97.1 °F (36.2 °C)   12/22/20 97.8 °F (36.6 °C) (Temporal)     Recent Labs     01/04/21  1600   WBC 7.4   LACTATE 1.9     Recent Labs     01/04/21  1600   BUN 7   CREATININE 0.7*     Estimated Creatinine Clearance: 154 mL/min (A) (based on SCr of 0.7 mg/dL (L)). No intake or output data in the 24 hours ending 01/04/21 2114    Pertinent Cultures:  Date    Source    Results  1/4/21   Blood    Collected  1/4/21   Wound    Collected    Previous vancomycin levels:  TROUGH:  No results for input(s): VANCOTROUGH in the last 72 hours. RANDOM:  No results for input(s): VANCORANDOM in the last 72 hours. Assessment/Plan:  A regimen of vancomycin 1,500mg q12h will be initiated  Pharmacy will continue to monitor patient and adjust therapy as indicated    Frieda 1/6/2021 @ 0530    Thank you for the consult.   Cherrie Garcia RPh  1/4/2021 9:14 PM

## 2021-01-06 ENCOUNTER — APPOINTMENT (OUTPATIENT)
Dept: MRI IMAGING | Age: 41
DRG: 305 | End: 2021-01-06
Payer: COMMERCIAL

## 2021-01-06 LAB
CULTURE: NORMAL
DOSE AMOUNT: ABNORMAL
DOSE TIME: ABNORMAL
GLUCOSE BLD-MCNC: 145 MG/DL (ref 70–99)
GLUCOSE BLD-MCNC: 156 MG/DL (ref 70–99)
GLUCOSE BLD-MCNC: 159 MG/DL (ref 70–99)
GLUCOSE BLD-MCNC: 205 MG/DL (ref 70–99)
Lab: NORMAL
SPECIMEN: NORMAL
TROPONIN T: <0.01 NG/ML
VANCOMYCIN TROUGH: 20.6 UG/ML (ref 10–20)

## 2021-01-06 PROCEDURE — 94761 N-INVAS EAR/PLS OXIMETRY MLT: CPT

## 2021-01-06 PROCEDURE — 73718 MRI LOWER EXTREMITY W/O DYE: CPT

## 2021-01-06 PROCEDURE — 6370000000 HC RX 637 (ALT 250 FOR IP): Performed by: NURSE PRACTITIONER

## 2021-01-06 PROCEDURE — 1200000000 HC SEMI PRIVATE

## 2021-01-06 PROCEDURE — 2580000003 HC RX 258: Performed by: NURSE PRACTITIONER

## 2021-01-06 PROCEDURE — 84484 ASSAY OF TROPONIN QUANT: CPT

## 2021-01-06 PROCEDURE — 6360000002 HC RX W HCPCS: Performed by: STUDENT IN AN ORGANIZED HEALTH CARE EDUCATION/TRAINING PROGRAM

## 2021-01-06 PROCEDURE — 80202 ASSAY OF VANCOMYCIN: CPT

## 2021-01-06 PROCEDURE — 36415 COLL VENOUS BLD VENIPUNCTURE: CPT

## 2021-01-06 PROCEDURE — 6360000002 HC RX W HCPCS: Performed by: NURSE PRACTITIONER

## 2021-01-06 RX ADMIN — SODIUM CHLORIDE, PRESERVATIVE FREE 10 ML: 5 INJECTION INTRAVENOUS at 08:55

## 2021-01-06 RX ADMIN — TRAMADOL HYDROCHLORIDE 50 MG: 50 TABLET, FILM COATED ORAL at 02:55

## 2021-01-06 RX ADMIN — INSULIN LISPRO 2 UNITS: 100 INJECTION, SOLUTION INTRAVENOUS; SUBCUTANEOUS at 12:10

## 2021-01-06 RX ADMIN — LORAZEPAM 2 MG: 2 INJECTION, SOLUTION INTRAMUSCULAR; INTRAVENOUS at 01:46

## 2021-01-06 RX ADMIN — CEFEPIME 2 G: 2 INJECTION, POWDER, FOR SOLUTION INTRAVENOUS at 01:15

## 2021-01-06 RX ADMIN — INSULIN LISPRO 2 UNITS: 100 INJECTION, SOLUTION INTRAVENOUS; SUBCUTANEOUS at 08:56

## 2021-01-06 RX ADMIN — CEFEPIME 2 G: 2 INJECTION, POWDER, FOR SOLUTION INTRAVENOUS at 16:04

## 2021-01-06 RX ADMIN — ASPIRIN 81 MG CHEWABLE TABLET 81 MG: 81 TABLET CHEWABLE at 08:56

## 2021-01-06 RX ADMIN — TRAMADOL HYDROCHLORIDE 50 MG: 50 TABLET, FILM COATED ORAL at 12:10

## 2021-01-06 RX ADMIN — VANCOMYCIN HYDROCHLORIDE 1500 MG: 5 INJECTION, POWDER, LYOPHILIZED, FOR SOLUTION INTRAVENOUS at 05:15

## 2021-01-06 RX ADMIN — VANCOMYCIN HYDROCHLORIDE 1500 MG: 5 INJECTION, POWDER, LYOPHILIZED, FOR SOLUTION INTRAVENOUS at 17:47

## 2021-01-06 RX ADMIN — TRAMADOL HYDROCHLORIDE 50 MG: 50 TABLET, FILM COATED ORAL at 23:27

## 2021-01-06 RX ADMIN — SODIUM CHLORIDE, PRESERVATIVE FREE 10 ML: 5 INJECTION INTRAVENOUS at 01:54

## 2021-01-06 RX ADMIN — PANTOPRAZOLE SODIUM 40 MG: 40 TABLET, DELAYED RELEASE ORAL at 06:15

## 2021-01-06 RX ADMIN — LORAZEPAM 2 MG: 2 INJECTION, SOLUTION INTRAMUSCULAR; INTRAVENOUS at 14:58

## 2021-01-06 RX ADMIN — CEFEPIME 2 G: 2 INJECTION, POWDER, FOR SOLUTION INTRAVENOUS at 08:55

## 2021-01-06 RX ADMIN — SODIUM CHLORIDE, PRESERVATIVE FREE 10 ML: 5 INJECTION INTRAVENOUS at 20:41

## 2021-01-06 RX ADMIN — LISINOPRIL AND HYDROCHLOROTHIAZIDE 1 TABLET: 12.5; 2 TABLET ORAL at 08:56

## 2021-01-06 RX ADMIN — ENOXAPARIN SODIUM 40 MG: 40 INJECTION SUBCUTANEOUS at 08:55

## 2021-01-06 RX ADMIN — INSULIN LISPRO 2 UNITS: 100 INJECTION, SOLUTION INTRAVENOUS; SUBCUTANEOUS at 17:09

## 2021-01-06 ASSESSMENT — PAIN DESCRIPTION - LOCATION: LOCATION: FOOT

## 2021-01-06 ASSESSMENT — PAIN SCALES - GENERAL
PAINLEVEL_OUTOF10: 8
PAINLEVEL_OUTOF10: 5
PAINLEVEL_OUTOF10: 5

## 2021-01-06 ASSESSMENT — PAIN DESCRIPTION - DESCRIPTORS: DESCRIPTORS: SHARP

## 2021-01-06 ASSESSMENT — PAIN DESCRIPTION - FREQUENCY: FREQUENCY: INTERMITTENT

## 2021-01-06 NOTE — PROGRESS NOTES
Nunu Pelletier MD, [address]                Internal Medicine Hospitalist             Daily Progress  Note   Subjective:     Chief Complaint   Patient presents with    Wound Check     Mr. Corinna Patel of nil, awaiting to be seen by podiatry to decide on the day of his surgery. Objective:    /80   Pulse 91   Temp 98 °F (36.7 °C) (Oral)   Resp 16   Ht 6' (1.829 m)   Wt 175 lb (79.4 kg)   SpO2 98%   BMI 23.73 kg/m²    No intake or output data in the 24 hours ending 01/06/21 1245   Physical Exam:  Heart:  Regular rate and rhythm, normal S1 and S2 in all 4 auscultatory areas. No rubs  Murmurs or gallops heard. Lungs: Mostly clear to auscultation, decreased breath sounds at bases. No wheezes appreciated no crackles heard. Abdomen: Soft, non distended. Bowel sounds appreciated. No obvious liver or spleen enlargement. Non tender, no rebound noted. Extremities: right foot bandaged. CNS: Grossly intact.     Labs:  CBC with Differential:    Lab Results   Component Value Date    WBC 5.9 01/05/2021    RBC 4.06 01/05/2021    HGB 12.2 01/05/2021    HCT 37.8 01/05/2021     01/05/2021    MCV 93.1 01/05/2021    MCH 30.0 01/05/2021    MCHC 32.3 01/05/2021    RDW 11.6 01/05/2021    SEGSPCT 59.1 01/05/2021    LYMPHOPCT 30.2 01/05/2021    MONOPCT 8.4 01/05/2021    BASOPCT 0.7 01/05/2021    MONOSABS 0.5 01/05/2021    LYMPHSABS 1.8 01/05/2021    EOSABS 0.1 01/05/2021    BASOSABS 0.0 01/05/2021    DIFFTYPE AUTOMATED DIFFERENTIAL 01/05/2021     BMP:    Lab Results   Component Value Date     01/05/2021    K 4.5 01/05/2021    K 3.8 03/14/2018    CL 96 01/05/2021    CO2 28 01/05/2021    BUN 4 01/05/2021    LABALBU 3.4 01/05/2021    CREATININE 0.8 01/05/2021    CALCIUM 8.9 01/05/2021    GFRAA >60 01/05/2021    LABGLOM >60 01/05/2021    GLUCOSE 121 01/05/2021     Recent Labs     01/05/21  0821 01/05/21  1330 01/06/21  0005   TROPONINT <0.010 <0.010 <0.010     Lab Results   Component Value Date    [facility] 2.640 02/22/2018         dextrose        aspirin  81 mg Oral Daily    lisinopril-hydroCHLOROthiazide  1 tablet Oral Daily    pantoprazole  40 mg Oral QAM AC    insulin lispro  0-12 Units Subcutaneous TID WC    insulin lispro  0-6 Units Subcutaneous Nightly    sodium chloride flush  10 mL Intravenous 2 times per day    enoxaparin  40 mg Subcutaneous Daily    cefepime  2 g Intravenous Q8H    vancomycin  1,500 mg Intravenous Q12H         Assessment:       Patient Active Problem List    Diagnosis Date Noted    Diabetic foot ulcer with osteomyelitis ([facility] [address] 01/04/2021    WD-Cellulitis of foot right 12/22/2020    Diabetic ulcer of right midfoot associated with type 2 diabetes mellitus, with fat layer exposed ([facility] [address] 08/11/2020    Septic embolism ([facility] [address] 06/13/2020    Gastroesophageal reflux disease without esophagitis 05/11/2020    Type 2 diabetes mellitus with diabetic neuropathy, without long-term current use of insulin ([facility])     Essential hypertension     Lumbar radiculopathy     S/P lumbar laminectomy 04/18/2018    Lumbar back pain with radiculopathy affecting left lower extremity 04/17/2018    Unstable angina ([facility] [address] 03/13/2018       Plan:     Problems being addressed this admission:     Right foot ulcer osteomyelitis  1/4/21-Diabetic foot ulcer with osteomyelitis - as per XR, no fever, hemodynamically stable. -known h/o right midfoot diabetic ulcer and f/u at wound care center -Podiatry surgeon, Dr. Brennan Douglass consulted in ED-empiric antibiotics: Cefepime + Vanc (pharm to dose)-elevated leg, NV checks-control  pain-wound care consulted-check lab works in AM-pending blood/wound cx, MRSA screening  1/5/21- to have u/s arterial and possible angio right leg as per vascular surgery. On vanco and maxipime. 1/6/21- vascular surgery wrote ' Pt does not have any arterial disease by duplex and he has excellent doppler pulses on exam. Okay to proceed with amputation.' Meanwhile continue with the antibiotics.  Surgery in 1-2 days per patient.         HTN  1/4/21-Hypertension, uncontrolled - could be nonadherent to treatment vs r/t pain-control pain-cont lisinopril-hydrochlorothiazide-monitor BP trends   1/5/21- b/p is 128/82 today continue to monitor. 1/6/21- 121/80        DM 2  1/4/21-DM type 2 - Last A1C 8.7-Sliding scale-monitor accucheck-diabetic diet-pending HgbA1C  1/5/21- sugar is 104 today. sodium is 133 will monitor. 1/6/21- sugar is 145 today continue to monitor.        Consultants:  Podiatry  Vascular surgery    General Orders:  Repeat basic labs again in am.  I have explained to the patient and discussed with him/her the treatment plan.   Brady Broderick MD, [address]

## 2021-01-06 NOTE — CONSULTS
PHARMACY [facility] HOSPITAL [facility] SERVICE    Gatito Quinteros is a 36 y.o. male started on vancomycin for diabetic foot ulcer with osteomyelitis. Pharmacy consulted by Dr. Saúl Hwang / NP Ardyth Goldberg for monitoring and adjustment. Indication for treatment: Diabetic foot ulcer with osteomyelitis  Goal trough: 15 mcg/mL  Other antimicrobials: Cefepime    Ht Readings from Last 1 Encounters:   01/04/21 6' (1.829 m)     Wt Readings from Last 3 Encounters:   01/04/21 175 lb (79.4 kg)   12/15/20 175 lb (79.4 kg)   09/23/20 181 lb (82.1 kg)        Pertinent Laboratory Values:   Temp Readings from Last 3 Encounters:   01/06/21 98 °F (36.7 °C) (Oral)   12/24/20 97.1 °F (36.2 °C)   12/22/20 97.8 °F (36.6 °C) (Temporal)     Recent Labs     01/04/21  1600 01/05/21  0821   WBC 7.4 5.9   LACTATE 1.9  --      Recent Labs     01/04/21  1600 01/05/21  0158 01/05/21  0821   BUN 7 4* 4*   CREATININE 0.7* 0.7* 0.8*     Estimated Creatinine Clearance: 135 mL/min (A) (based on SCr of 0.8 mg/dL (L)). 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 Assessment/Plan:  · WBC has been normal, pt afebrile  · Renal trends normal, no UOP data  · Osteo of foot, amputation pending  · Vancomyicn day #3, only one vanco dose given yesterday  · Vanco level not drawn this morning. Since the patient only received 1 vancomycin dose yesterday, will reschedule the level for tomorrow morning after 2 more vanco doses have been given. · Pharmacy will continue to follow renal trends and adjust the vancomycin dose as needed. VANCOMYCIN TROUGH SCHEDULED FOR 1/7/2021 @ 7121    Thank you for the consult. Alberto Friend  Gerardo Hodgkin  1/6/2021 1:35 PM

## 2021-01-07 ENCOUNTER — ANESTHESIA EVENT (OUTPATIENT)
Dept: OPERATING ROOM | Age: 41
DRG: 305 | End: 2021-01-07
Payer: COMMERCIAL

## 2021-01-07 LAB
ANION GAP SERPL CALCULATED.3IONS-SCNC: 15 MMOL/L (ref 4–16)
BUN BLDV-MCNC: 9 MG/DL (ref 6–23)
CALCIUM SERPL-MCNC: 8.1 MG/DL (ref 8.3–10.6)
CHLORIDE BLD-SCNC: 96 MMOL/L (ref 99–110)
CO2: 19 MMOL/L (ref 21–32)
CREAT SERPL-MCNC: 0.7 MG/DL (ref 0.9–1.3)
DOSE AMOUNT: NORMAL
DOSE TIME: NORMAL
GFR AFRICAN AMERICAN: >60 ML/MIN/1.73M2
GFR NON-AFRICAN AMERICAN: >60 ML/MIN/1.73M2
GLUCOSE BLD-MCNC: 106 MG/DL (ref 70–99)
GLUCOSE BLD-MCNC: 118 MG/DL (ref 70–99)
GLUCOSE BLD-MCNC: 124 MG/DL (ref 70–99)
GLUCOSE BLD-MCNC: 172 MG/DL (ref 70–99)
GLUCOSE BLD-MCNC: 190 MG/DL (ref 70–99)
HCT VFR BLD CALC: 35 % (ref 42–52)
HEMOGLOBIN: 11.6 GM/DL (ref 13.5–18)
MCH RBC QN AUTO: 31.1 PG (ref 27–31)
MCHC RBC AUTO-ENTMCNC: 33.1 % (ref 32–36)
MCV RBC AUTO: 93.8 FL (ref 78–100)
OSMOLALITY: 284 MOS/L (ref 280–300)
PDW BLD-RTO: 11.7 % (ref 11.7–14.9)
PLATELET # BLD: 480 K/CU MM (ref 140–440)
PMV BLD AUTO: 8.8 FL (ref 7.5–11.1)
POTASSIUM SERPL-SCNC: 4.5 MMOL/L (ref 3.5–5.1)
RBC # BLD: 3.73 M/CU MM (ref 4.6–6.2)
SODIUM BLD-SCNC: 130 MMOL/L (ref 135–145)
TROPONIN T: <0.01 NG/ML
VANCOMYCIN TROUGH: 12.2 UG/ML (ref 10–20)
WBC # BLD: 8.4 K/CU MM (ref 4–10.5)

## 2021-01-07 PROCEDURE — 6370000000 HC RX 637 (ALT 250 FOR IP): Performed by: NURSE PRACTITIONER

## 2021-01-07 PROCEDURE — 82962 GLUCOSE BLOOD TEST: CPT

## 2021-01-07 PROCEDURE — 1200000000 HC SEMI PRIVATE

## 2021-01-07 PROCEDURE — 82565 ASSAY OF CREATININE: CPT

## 2021-01-07 PROCEDURE — 94761 N-INVAS EAR/PLS OXIMETRY MLT: CPT

## 2021-01-07 PROCEDURE — 80202 ASSAY OF VANCOMYCIN: CPT

## 2021-01-07 PROCEDURE — 83930 ASSAY OF BLOOD OSMOLALITY: CPT

## 2021-01-07 PROCEDURE — 2580000003 HC RX 258: Performed by: FAMILY MEDICINE

## 2021-01-07 PROCEDURE — 36415 COLL VENOUS BLD VENIPUNCTURE: CPT

## 2021-01-07 PROCEDURE — 6360000002 HC RX W HCPCS: Performed by: NURSE PRACTITIONER

## 2021-01-07 PROCEDURE — 80048 BASIC METABOLIC PNL TOTAL CA: CPT

## 2021-01-07 PROCEDURE — 2580000003 HC RX 258: Performed by: NURSE PRACTITIONER

## 2021-01-07 PROCEDURE — 6360000002 HC RX W HCPCS: Performed by: FAMILY MEDICINE

## 2021-01-07 PROCEDURE — 84484 ASSAY OF TROPONIN QUANT: CPT

## 2021-01-07 PROCEDURE — 85027 COMPLETE CBC AUTOMATED: CPT

## 2021-01-07 PROCEDURE — 6360000002 HC RX W HCPCS: Performed by: STUDENT IN AN ORGANIZED HEALTH CARE EDUCATION/TRAINING PROGRAM

## 2021-01-07 RX ADMIN — ENOXAPARIN SODIUM 40 MG: 40 INJECTION SUBCUTANEOUS at 09:09

## 2021-01-07 RX ADMIN — ASPIRIN 81 MG CHEWABLE TABLET 81 MG: 81 TABLET CHEWABLE at 09:09

## 2021-01-07 RX ADMIN — CEFEPIME 2 G: 2 INJECTION, POWDER, FOR SOLUTION INTRAVENOUS at 22:23

## 2021-01-07 RX ADMIN — TRAMADOL HYDROCHLORIDE 50 MG: 50 TABLET, FILM COATED ORAL at 13:23

## 2021-01-07 RX ADMIN — LISINOPRIL AND HYDROCHLOROTHIAZIDE 1 TABLET: 12.5; 2 TABLET ORAL at 09:09

## 2021-01-07 RX ADMIN — CEFEPIME 2 G: 2 INJECTION, POWDER, FOR SOLUTION INTRAVENOUS at 00:16

## 2021-01-07 RX ADMIN — SODIUM CHLORIDE, PRESERVATIVE FREE 10 ML: 5 INJECTION INTRAVENOUS at 22:19

## 2021-01-07 RX ADMIN — LORAZEPAM 2 MG: 2 INJECTION, SOLUTION INTRAMUSCULAR; INTRAVENOUS at 14:06

## 2021-01-07 RX ADMIN — SODIUM CHLORIDE, PRESERVATIVE FREE 10 ML: 5 INJECTION INTRAVENOUS at 09:09

## 2021-01-07 RX ADMIN — CEFEPIME 2 G: 2 INJECTION, POWDER, FOR SOLUTION INTRAVENOUS at 09:09

## 2021-01-07 RX ADMIN — PANTOPRAZOLE SODIUM 40 MG: 40 TABLET, DELAYED RELEASE ORAL at 06:47

## 2021-01-07 RX ADMIN — SODIUM CHLORIDE, PRESERVATIVE FREE 10 ML: 5 INJECTION INTRAVENOUS at 01:02

## 2021-01-07 RX ADMIN — VANCOMYCIN HYDROCHLORIDE 1750 MG: 5 INJECTION, POWDER, LYOPHILIZED, FOR SOLUTION INTRAVENOUS at 17:00

## 2021-01-07 ASSESSMENT — PAIN DESCRIPTION - PAIN TYPE: TYPE: ACUTE PAIN

## 2021-01-07 ASSESSMENT — PAIN DESCRIPTION - ORIENTATION: ORIENTATION: RIGHT

## 2021-01-07 ASSESSMENT — PAIN DESCRIPTION - FREQUENCY: FREQUENCY: INTERMITTENT

## 2021-01-07 NOTE — ANESTHESIA PRE PROCEDURE
Department of Anesthesiology  Preprocedure Note       Name:  Rosa Cervantes   Age:  36 y.o.  :  1980                                          MRN:  [id number]         Date:  2021      Surgeon: Jordin Sahni):  Samir Mullins DPM    Procedure: Procedure(s):  RIGHT TRANSMETATARSAL TOE AMPUTATION  RIGHT ACHILLES TENDON LENGTHENING REPAIR    Medications prior to admission:   Prior to Admission medications    Medication Sig Start Date End Date Taking? Authorizing Provider   pioglitazone (ACTOS) 15 MG tablet Take 1 tablet by mouth daily 10/1/20 1/4/21 Yes Wilder Arroyo MD   omeprazole (PRILOSEC) 20 MG delayed release capsule TAKE 1 CAPSULE BY MOUTH ONCE DAILY IN THE MORNING 20  Yes Wilder Arroyo MD   lisinopril-hydroCHLOROthiazide (PRINZIDE;ZESTORETIC) 20-12.5 MG per tablet TAKE 1 TABLET BY MOUTH ONCE DAILY 20  Yes Wilder Arroyo MD   metFORMIN (GLUCOPHAGE) 500 MG tablet Take 2 tablets by mouth 2 times daily (with meals) 20 Yes Wilder Arroyo MD   glyBURIDE (DIABETA) 5 MG tablet Take 2 tablets by mouth 2 times daily (with meals) 20 Yes Wilder Arroyo MD   blood glucose monitor kit and supplies Dispense sufficient amount for indicated testing frequency plus additional to accommodate PRN testing needs. Dispense all needed supplies to include: monitor, strips, lancing device, lancets, control solutions, alcohol swabs. 6/15/20  Yes Gianluca Harper MD   blood glucose monitor strips Test 2 times a day & as needed for symptoms of irregular blood glucose. Dispense sufficient amount for indicated testing frequency plus additional to accommodate PRN testing needs.  6/15/20  Yes Gianluca Harper MD   FreeStyle Lancets MISC 1 each by Does not apply route daily 6/15/20  Yes Gianluca Harper MD   aspirin 81 MG tablet Take 81 mg by mouth daily   Yes Historical Provider, MD   acetaminophen (TYLENOL) 325 MG tablet Take 2 tablets by mouth every 4 hours as needed for Pain or Fever 2/28/18  Yes Dorina Manzanares MD       Current medications:    Current Facility-Administered Medications   Medication Dose Route Frequency Provider Last Rate Last Admin    aspirin chewable tablet 81 mg  81 mg Oral Daily Makennapetr Venturaom, APRN - CNP   81 mg at 01/07/21 0909    lisinopril-hydroCHLOROthiazide (PRINZIDE;ZESTORETIC) 20-12.5 MG per tablet 1 tablet  1 tablet Oral Daily Makennapter Venturaom, APRN - CNP   1 tablet at 01/07/21 0909    pantoprazole (PROTONIX) tablet 40 mg  40 mg Oral QAM AC Stephanie Casillas, APRN - CNP   40 mg at 01/07/21 0647    insulin lispro (HUMALOG) injection vial 0-12 Units  0-12 Units Subcutaneous TID WC Makennapetr Venturaom, APRN - CNP   2 Units at 01/06/21 1709    insulin lispro (HUMALOG) injection vial 0-6 Units  0-6 Units Subcutaneous Nightly Makenna Gibson, APRN - CNP   2 Units at 01/06/21 2251    glucose (GLUTOSE) 40 % oral gel 15 g  15 g Oral PRN Stephanie Casillas, APRN - CNP        dextrose 50 % IV solution  12.5 g Intravenous PRN Stephanie Casillas, APRN - CNP        glucagon (rDNA) injection 1 mg  1 mg Intramuscular PRN Stephanie Casillas, APRN - CNP        dextrose 5 % solution  100 mL/hr Intravenous PRN Makenna Reserve, APRN - CNP        sodium chloride flush 0.9 % injection 10 mL  10 mL Intravenous 2 times per day Makenna Reserve, APRN - CNP   10 mL at 01/07/21 0909    sodium chloride flush 0.9 % injection 10 mL  10 mL Intravenous PRN Makenna Reserve, APRN - CNP   10 mL at 01/07/21 0102    enoxaparin (LOVENOX) injection 40 mg  40 mg Subcutaneous Daily Stephanie Casillas, APRN - CNP   40 mg at 01/07/21 0909    promethazine (PHENERGAN) tablet 12.5 mg  12.5 mg Oral Q6H PRN Makenna Reserve, APRN - CNP        Or    ondansetron (ZOFRAN) injection 4 mg  4 mg Intravenous Q6H PRN Stephanie Casillas, APRN - CNP        polyethylene glycol (GLYCOLAX) packet 17 g  17 g Oral Daily PRN Makenna Reserve, APRN - CNP        acetaminophen (TYLENOL) tablet 650 mg  650 mg Oral Q6H PRN Elner Barrier, APRN - CNP        Or    acetaminophen (TYLENOL) suppository 650 mg  650 mg Rectal Q6H PRN Elner Barrier, APRN - CNP        cefepime (MAXIPIME) 2 g IVPB minibag  2 g Intravenous Q8H Stephanie Casillas, APRN -  mL/hr at 01/07/21 0909 2 g at 01/07/21 0909    vancomycin (VANCOCIN) 1,500 mg in dextrose 5 % 500 mL IVPB  1,500 mg Intravenous Q12H Elner Barrier, APRN - CNP   Stopped at 01/06/21 1947    traMADol (ULTRAM) tablet 50 mg  50 mg Oral Q6H PRN Elner Barrier, APRN - CNP   50 mg at 01/06/21 2327    LORazepam (ATIVAN) injection 2 mg  2 mg Intravenous Q4H PRN Herson Connors DO   2 mg at 01/06/21 1458       Allergies:  No Known Allergies    Problem List:    Patient Active Problem List   Diagnosis Code    Unstable angina ([facility] [address] I20.0    Lumbar back pain with radiculopathy affecting left lower extremity M54.16    S/P lumbar laminectomy Z98.890    Type 2 diabetes mellitus with diabetic neuropathy, without long-term current use of insulin ([facility]) E11.40    Essential hypertension I10    Lumbar radiculopathy M54.16    Gastroesophageal reflux disease without esophagitis K21.9    Septic embolism ([facility] [address] I76    Diabetic ulcer of right midfoot associated with type 2 diabetes mellitus, with fat layer exposed ([facility] [address] E11.621, L97.412    WD-Cellulitis of foot right L03.119    Diabetic foot ulcer with osteomyelitis ([facility]) E11.621, E11.69, L97.509, M86.9       Past Medical History:        Diagnosis Date    Callus of foot     Right foot - see's Dr. Jamar Sierra Dizziness     positional    Essential hypertension     Follows with PCP    Lumbar radiculopathy        Past Surgical History:        Procedure Laterality Date    CARDIAC CATHETERIZATION  03/2018    Charlie Constable    DENTAL SURGERY      teeth extractions -half per patient    HERNIA REPAIR Bilateral 5/27/2020    BIALTERAL HERNIA INGUINAL REPAIR performed by Pilar De Leon MD at [facility]  2018    VT OFFICE/OUTPT VISIT,PROCEDURE ONLY Left 4/17/2018    L5-S1 HEMILAMINECTOMY, REMOVAL OF DISC LEFT SIDE performed by Janell Valdovinos MD at [address] EXTRACTION         Social History:    Social History     Tobacco Use    Smoking status: Never Smoker    Smokeless tobacco: Never Used   Substance Use Topics    Alcohol use: Yes     Comment: \"couple beers a night\"                                Counseling given: Not Answered      Vital Signs (Current):   Vitals:    01/06/21 1720 01/06/21 2100 01/07/21 0422 01/07/21 0813   BP: 113/68 111/74 109/70 117/81   Pulse: 92 97 86 91   Resp: 16 20 18 19   Temp: 36.8 °C (98.3 °F) 36.8 °C (98.2 °F) 36.4 °C (97.5 °F) 36.8 °C (98.3 °F)   TempSrc: Oral Oral Oral Oral   SpO2: 98% 98% 97% 98%   Weight: 163 lb 4.8 oz (74.1 kg)      Height:                                                  BP Readings from Last 3 Encounters:   01/07/21 117/81   12/22/20 (!) 153/79   12/15/20 125/79       NPO Status:                                                                                 BMI:   Wt Readings from Last 3 Encounters:   01/06/21 163 lb 4.8 oz (74.1 kg)   12/15/20 175 lb (79.4 kg)   09/23/20 181 lb (82.1 kg)     Body mass index is 22.15 kg/m².     CBC:   Lab Results   Component Value Date    WBC 8.4 01/07/2021    RBC 3.73 01/07/2021    HGB 11.6 01/07/2021    HCT 35.0 01/07/2021    MCV 93.8 01/07/2021    RDW 11.7 01/07/2021     01/07/2021       CMP:   Lab Results   Component Value Date     01/07/2021    K 4.5 01/07/2021    K 3.8 03/14/2018    CL 96 01/07/2021    CO2 19 01/07/2021    BUN 9 01/07/2021    CREATININE 0.7 01/07/2021    GFRAA >60 01/07/2021    AGRATIO 1.4 03/12/2020    LABGLOM >60 01/07/2021    GLUCOSE 190 01/07/2021    PROT 7.8 01/05/2021    CALCIUM 8.1 01/07/2021    BILITOT 0.4 01/05/2021    ALKPHOS 50 01/05/2021    AST 15 01/05/2021    ALT 12 01/05/2021       POC Tests:   Recent Labs     01/07/21  0813   POCGLU 118*       Coags:   Lab Results   Component Value Date    PROTIME 12.3 06/14/2020    INR 1.02 06/14/2020    APTT 28.2 06/14/2020       HCG (If Applicable): No results found for: PREGTESTUR, PREGSERUM, HCG, HCGQUANT     ABGs: No results found for: PHART, PO2ART, HUD6ZUI, ZGP7NRD, BEART, K7KKZTBM     Type & Screen (If Applicable):  Lab Results   Component Value Date    LABRH POS 04/17/2018       Drug/Infectious Status (If Applicable):  No results found for: HIV, HEPCAB    COVID-19 Screening (If Applicable):   Lab Results   Component Value Date    COVID19 NOT DETECTED 12/24/2020         Anesthesia Evaluation    Airway:         Dental:          Pulmonary:                              Cardiovascular:    (+) hypertension:, angina:,                   Neuro/Psych:               GI/Hepatic/Renal:   (+) GERD:,           Endo/Other:    (+) DiabetesType II DM, , .                 Abdominal:           Vascular:                                        Anesthesia Plan      ASA 3     (Chart review)  Induction: intravenous.                           RAFAEL Hirsch - CRNA   1/7/2021

## 2021-01-07 NOTE — PROGRESS NOTES
01/07/21  1029   TROPONINT <0.010 <0.010 <0.010     Lab Results   Component Value Date    [facility] 2.640 02/22/2018         dextrose        vancomycin  1,750 mg Intravenous Q12H    aspirin  81 mg Oral Daily    lisinopril-hydroCHLOROthiazide  1 tablet Oral Daily    pantoprazole  40 mg Oral QAM AC    insulin lispro  0-12 Units Subcutaneous TID WC    insulin lispro  0-6 Units Subcutaneous Nightly    sodium chloride flush  10 mL Intravenous 2 times per day    enoxaparin  40 mg Subcutaneous Daily    cefepime  2 g Intravenous Q8H         Assessment:       Patient Active Problem List    Diagnosis Date Noted    Diabetic foot ulcer with osteomyelitis ([facility] [address] 01/04/2021    WD-Cellulitis of foot right 12/22/2020    Diabetic ulcer of right midfoot associated with type 2 diabetes mellitus, with fat layer exposed ([facility] [address] 08/11/2020    Septic embolism ([facility] [address] 06/13/2020    Gastroesophageal reflux disease without esophagitis 05/11/2020    Type 2 diabetes mellitus with diabetic neuropathy, without long-term current use of insulin ([facility])     Essential hypertension     Lumbar radiculopathy     S/P lumbar laminectomy 04/18/2018    Lumbar back pain with radiculopathy affecting left lower extremity 04/17/2018    Unstable angina ([facility] [address] 03/13/2018       Plan:     Problems being addressed this admission:   Right foot ulcer osteomyelitis  1/4/21-Diabetic foot ulcer with osteomyelitis - as per XR, no fever, hemodynamically stable. -known h/o right midfoot diabetic ulcer and f/u at wound care center -Podiatry surgeon, Dr. Socorro Llanes consulted in ED-empiric antibiotics: Cefepime + Vanc (pharm to dose)-elevated leg, NV checks-control  pain-wound care consulted-check lab works in AM-pending blood/wound cx, MRSA screening  1/5/21- to have u/s arterial and possible angio right leg as per vascular surgery.  On vanco and maxipime.   1/6/21- vascular surgery wrote ' Pt does not have any arterial disease by duplex and he has excellent doppler pulses on exam. Okay to proceed with amputation.' Meanwhile continue with the antibiotics. Surgery in 1-2 days per patient. 1/7/21- His Evangelista perioperative cardiac risk is low. Continue as before Possible surgery tomorrow.         HTN  1/4/21-Hypertension, uncontrolled - could be nonadherent to treatment vs r/t pain-control pain-cont lisinopril-hydrochlorothiazide-monitor BP trends   1/5/21- b/p is 128/82 today continue to monitor.   1/6/21- 121/80  1/7/21- 117/81      DM 2  1/4/21-DM type 2 - Last A1C 8.7-Sliding scale-monitor accucheck-diabetic diet-pending HgbA1C  1/5/21- sugar is 104 today. sodium is 133 will monitor.   1/6/21- sugar is 145 today continue to monitor. 1/7/21- sugar si 1124    Hyponatremia  1/7/21- I am not sure why it is low check osmolality and urine sodium. Repeat labs in am.         Consultants:  Podiatry  Vascular surgery    General Orders:  Repeat basic labs again in am.  I have explained to the patient and discussed with him/her the treatment plan.   Laura Natarajan MD, [address]

## 2021-01-07 NOTE — CONSULTS
PHARMACY [facility] SERVICE    Chapis Felix is a 36 y.o. male started on vancomycin for diabetic foot ulcer with osteomyelitis. Pharmacy consulted by Dr. Tonya Leon / NP Delight Halsted for monitoring and adjustment. Indication for treatment: Diabetic foot ulcer with osteomyelitis  Goal trough: 15 mcg/mL  Other antimicrobials: Cefepime    Ht Readings from Last 1 Encounters:   01/04/21 6' (1.829 m)     Wt Readings from Last 3 Encounters:   01/06/21 163 lb 4.8 oz (74.1 kg)   12/15/20 175 lb (79.4 kg)   09/23/20 181 lb (82.1 kg)        Pertinent Laboratory Values:   Temp Readings from Last 3 Encounters:   01/07/21 98.3 °F (36.8 °C) (Oral)   12/24/20 97.1 °F (36.2 °C)   12/22/20 97.8 °F (36.6 °C) (Temporal)     Recent Labs     01/04/21  1600 01/05/21  0821 01/07/21  0245   WBC 7.4 5.9 8.4   LACTATE 1.9  --   --      Recent Labs     01/05/21  0158 01/05/21  0821 01/07/21  0245   BUN 4* 4* 9   CREATININE 0.7* 0.8* 0.7*     Estimated Creatinine Clearance: 147 mL/min (A) (based on SCr of 0.7 mg/dL (L)). 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 Assessment/Plan:  · WBC has been normal, pt afebrile  · SCR remains normal, no UOP data  · Osteo of foot, amputation of toe today  · Vancomyicn day #4  · Vancomycin level @12.2 about 9 hours post 1,500mg dose, true trough likely below 10. Will target a trough closer to 15. · Renal trends remain normal  · Increase to vancomycin 1,750mg ivpb q12h  · Recheck the trough before the 4th 1,750mg dose. · Pharmacy will continue to follow renal trends and adjust the vancomycin dose as needed.     VANCOMYCIN TROUGH SCHEDULED FOR 1/9 @ 00:30    Thank you for the consult. Hunter Alonso  1/7/2021 12:12 PM

## 2021-01-07 NOTE — PROGRESS NOTES
This nurse notified pharmacy x2 that vancomycin  was not in either refridgerator on the unit. Pharmacy stated that the medication was sent up to the unit. Medication was not found. Pharmacy stated that they would send up medication asap.

## 2021-01-08 ENCOUNTER — ANESTHESIA (OUTPATIENT)
Dept: OPERATING ROOM | Age: 41
DRG: 305 | End: 2021-01-08
Payer: COMMERCIAL

## 2021-01-08 ENCOUNTER — APPOINTMENT (OUTPATIENT)
Dept: GENERAL RADIOLOGY | Age: 41
DRG: 305 | End: 2021-01-08
Payer: COMMERCIAL

## 2021-01-08 VITALS
DIASTOLIC BLOOD PRESSURE: 94 MMHG | TEMPERATURE: 98.6 F | RESPIRATION RATE: 1 BRPM | OXYGEN SATURATION: 100 % | SYSTOLIC BLOOD PRESSURE: 164 MMHG

## 2021-01-08 LAB
ANION GAP SERPL CALCULATED.3IONS-SCNC: 9 MMOL/L (ref 4–16)
BUN BLDV-MCNC: 11 MG/DL (ref 6–23)
CALCIUM SERPL-MCNC: 8.8 MG/DL (ref 8.3–10.6)
CHLORIDE BLD-SCNC: 95 MMOL/L (ref 99–110)
CO2: 28 MMOL/L (ref 21–32)
CREAT SERPL-MCNC: 0.8 MG/DL (ref 0.9–1.3)
GFR AFRICAN AMERICAN: >60 ML/MIN/1.73M2
GFR NON-AFRICAN AMERICAN: >60 ML/MIN/1.73M2
GLUCOSE BLD-MCNC: 136 MG/DL (ref 70–99)
GLUCOSE BLD-MCNC: 147 MG/DL (ref 70–99)
GLUCOSE BLD-MCNC: 158 MG/DL (ref 70–99)
GLUCOSE BLD-MCNC: 168 MG/DL (ref 70–99)
GLUCOSE BLD-MCNC: 98 MG/DL (ref 70–99)
HCT VFR BLD CALC: 40 % (ref 42–52)
HEMOGLOBIN: 13 GM/DL (ref 13.5–18)
MCH RBC QN AUTO: 30.3 PG (ref 27–31)
MCHC RBC AUTO-ENTMCNC: 32.5 % (ref 32–36)
MCV RBC AUTO: 93.2 FL (ref 78–100)
PDW BLD-RTO: 11.7 % (ref 11.7–14.9)
PLATELET # BLD: 538 K/CU MM (ref 140–440)
PMV BLD AUTO: 9.2 FL (ref 7.5–11.1)
POTASSIUM SERPL-SCNC: 4.6 MMOL/L (ref 3.5–5.1)
RBC # BLD: 4.29 M/CU MM (ref 4.6–6.2)
SARS-COV-2, NAAT: NOT DETECTED
SODIUM BLD-SCNC: 132 MMOL/L (ref 135–145)
TROPONIN T: 0.01 NG/ML
TROPONIN T: <0.01 NG/ML
WBC # BLD: 7 K/CU MM (ref 4–10.5)

## 2021-01-08 PROCEDURE — 3600000003 HC SURGERY LEVEL 3 BASE: Performed by: PODIATRIST

## 2021-01-08 PROCEDURE — 2580000003 HC RX 258: Performed by: PODIATRIST

## 2021-01-08 PROCEDURE — 6360000002 HC RX W HCPCS: Performed by: FAMILY MEDICINE

## 2021-01-08 PROCEDURE — 80048 BASIC METABOLIC PNL TOTAL CA: CPT

## 2021-01-08 PROCEDURE — 87075 CULTR BACTERIA EXCEPT BLOOD: CPT

## 2021-01-08 PROCEDURE — 3700000000 HC ANESTHESIA ATTENDED CARE: Performed by: PODIATRIST

## 2021-01-08 PROCEDURE — 2580000003 HC RX 258: Performed by: NURSE ANESTHETIST, CERTIFIED REGISTERED

## 2021-01-08 PROCEDURE — 6360000002 HC RX W HCPCS: Performed by: NURSE ANESTHETIST, CERTIFIED REGISTERED

## 2021-01-08 PROCEDURE — 87070 CULTURE OTHR SPECIMN AEROBIC: CPT

## 2021-01-08 PROCEDURE — 6360000002 HC RX W HCPCS: Performed by: PODIATRIST

## 2021-01-08 PROCEDURE — 88307 TISSUE EXAM BY PATHOLOGIST: CPT | Performed by: PATHOLOGY

## 2021-01-08 PROCEDURE — 2580000003 HC RX 258: Performed by: NURSE PRACTITIONER

## 2021-01-08 PROCEDURE — 87077 CULTURE AEROBIC IDENTIFY: CPT

## 2021-01-08 PROCEDURE — 6370000000 HC RX 637 (ALT 250 FOR IP): Performed by: NURSE PRACTITIONER

## 2021-01-08 PROCEDURE — 7100000001 HC PACU RECOVERY - ADDTL 15 MIN: Performed by: PODIATRIST

## 2021-01-08 PROCEDURE — 87205 SMEAR GRAM STAIN: CPT

## 2021-01-08 PROCEDURE — 76000 FLUOROSCOPY <1 HR PHYS/QHP: CPT

## 2021-01-08 PROCEDURE — U0002 COVID-19 LAB TEST NON-CDC: HCPCS

## 2021-01-08 PROCEDURE — 85027 COMPLETE CBC AUTOMATED: CPT

## 2021-01-08 PROCEDURE — 6370000000 HC RX 637 (ALT 250 FOR IP): Performed by: PODIATRIST

## 2021-01-08 PROCEDURE — 3700000001 HC ADD 15 MINUTES (ANESTHESIA): Performed by: PODIATRIST

## 2021-01-08 PROCEDURE — 2709999900 HC NON-CHARGEABLE SUPPLY: Performed by: PODIATRIST

## 2021-01-08 PROCEDURE — 2500000003 HC RX 250 WO HCPCS: Performed by: NURSE ANESTHETIST, CERTIFIED REGISTERED

## 2021-01-08 PROCEDURE — 1200000000 HC SEMI PRIVATE

## 2021-01-08 PROCEDURE — 6360000002 HC RX W HCPCS: Performed by: NURSE PRACTITIONER

## 2021-01-08 PROCEDURE — 36415 COLL VENOUS BLD VENIPUNCTURE: CPT

## 2021-01-08 PROCEDURE — 2580000003 HC RX 258: Performed by: FAMILY MEDICINE

## 2021-01-08 PROCEDURE — 88302 TISSUE EXAM BY PATHOLOGIST: CPT | Performed by: PATHOLOGY

## 2021-01-08 PROCEDURE — 2500000003 HC RX 250 WO HCPCS: Performed by: PODIATRIST

## 2021-01-08 PROCEDURE — 82962 GLUCOSE BLOOD TEST: CPT

## 2021-01-08 PROCEDURE — 3600000013 HC SURGERY LEVEL 3 ADDTL 15MIN: Performed by: PODIATRIST

## 2021-01-08 PROCEDURE — 88304 TISSUE EXAM BY PATHOLOGIST: CPT | Performed by: PATHOLOGY

## 2021-01-08 PROCEDURE — 7100000000 HC PACU RECOVERY - FIRST 15 MIN: Performed by: PODIATRIST

## 2021-01-08 PROCEDURE — 6370000000 HC RX 637 (ALT 250 FOR IP): Performed by: PHYSICIAN ASSISTANT

## 2021-01-08 PROCEDURE — 88311 DECALCIFY TISSUE: CPT | Performed by: PATHOLOGY

## 2021-01-08 PROCEDURE — 84484 ASSAY OF TROPONIN QUANT: CPT

## 2021-01-08 RX ORDER — HYDROMORPHONE HCL 110MG/55ML
0.5 PATIENT CONTROLLED ANALGESIA SYRINGE INTRAVENOUS EVERY 5 MIN PRN
Status: DISCONTINUED | OUTPATIENT
Start: 2021-01-08 | End: 2021-01-08 | Stop reason: HOSPADM

## 2021-01-08 RX ORDER — LIDOCAINE HYDROCHLORIDE 20 MG/ML
INJECTION, SOLUTION INTRAVENOUS PRN
Status: DISCONTINUED | OUTPATIENT
Start: 2021-01-08 | End: 2021-01-08 | Stop reason: SDUPTHER

## 2021-01-08 RX ORDER — SODIUM CHLORIDE 9 MG/ML
INJECTION, SOLUTION INTRAVENOUS CONTINUOUS PRN
Status: DISCONTINUED | OUTPATIENT
Start: 2021-01-08 | End: 2021-01-08 | Stop reason: SDUPTHER

## 2021-01-08 RX ORDER — PROPOFOL 10 MG/ML
INJECTION, EMULSION INTRAVENOUS PRN
Status: DISCONTINUED | OUTPATIENT
Start: 2021-01-08 | End: 2021-01-08 | Stop reason: SDUPTHER

## 2021-01-08 RX ORDER — HYDROMORPHONE HCL 110MG/55ML
PATIENT CONTROLLED ANALGESIA SYRINGE INTRAVENOUS PRN
Status: DISCONTINUED | OUTPATIENT
Start: 2021-01-08 | End: 2021-01-08 | Stop reason: SDUPTHER

## 2021-01-08 RX ORDER — HYDROCODONE BITARTRATE AND ACETAMINOPHEN 5; 325 MG/1; MG/1
1 TABLET ORAL PRN
Status: DISCONTINUED | OUTPATIENT
Start: 2021-01-08 | End: 2021-01-08 | Stop reason: HOSPADM

## 2021-01-08 RX ORDER — PROMETHAZINE HYDROCHLORIDE 25 MG/ML
6.25 INJECTION, SOLUTION INTRAMUSCULAR; INTRAVENOUS
Status: DISCONTINUED | OUTPATIENT
Start: 2021-01-08 | End: 2021-01-08 | Stop reason: HOSPADM

## 2021-01-08 RX ORDER — MEPERIDINE HYDROCHLORIDE 25 MG/ML
12.5 INJECTION INTRAMUSCULAR; INTRAVENOUS; SUBCUTANEOUS EVERY 5 MIN PRN
Status: DISCONTINUED | OUTPATIENT
Start: 2021-01-08 | End: 2021-01-08 | Stop reason: HOSPADM

## 2021-01-08 RX ORDER — HYDROCODONE BITARTRATE AND ACETAMINOPHEN 5; 325 MG/1; MG/1
1 TABLET ORAL EVERY 6 HOURS PRN
Status: DISCONTINUED | OUTPATIENT
Start: 2021-01-08 | End: 2021-01-13 | Stop reason: HOSPADM

## 2021-01-08 RX ORDER — HYDROCODONE BITARTRATE AND ACETAMINOPHEN 5; 325 MG/1; MG/1
2 TABLET ORAL PRN
Status: DISCONTINUED | OUTPATIENT
Start: 2021-01-08 | End: 2021-01-08 | Stop reason: HOSPADM

## 2021-01-08 RX ORDER — FENTANYL CITRATE 50 UG/ML
50 INJECTION, SOLUTION INTRAMUSCULAR; INTRAVENOUS EVERY 5 MIN PRN
Status: DISCONTINUED | OUTPATIENT
Start: 2021-01-08 | End: 2021-01-08 | Stop reason: HOSPADM

## 2021-01-08 RX ORDER — ROCURONIUM BROMIDE 10 MG/ML
INJECTION, SOLUTION INTRAVENOUS PRN
Status: DISCONTINUED | OUTPATIENT
Start: 2021-01-08 | End: 2021-01-08 | Stop reason: SDUPTHER

## 2021-01-08 RX ORDER — MIDAZOLAM HYDROCHLORIDE 1 MG/ML
INJECTION INTRAMUSCULAR; INTRAVENOUS PRN
Status: DISCONTINUED | OUTPATIENT
Start: 2021-01-08 | End: 2021-01-08 | Stop reason: SDUPTHER

## 2021-01-08 RX ORDER — LABETALOL HYDROCHLORIDE 5 MG/ML
5 INJECTION, SOLUTION INTRAVENOUS EVERY 10 MIN PRN
Status: DISCONTINUED | OUTPATIENT
Start: 2021-01-08 | End: 2021-01-08 | Stop reason: HOSPADM

## 2021-01-08 RX ORDER — BUPIVACAINE HYDROCHLORIDE 5 MG/ML
INJECTION, SOLUTION PERINEURAL
Status: COMPLETED | OUTPATIENT
Start: 2021-01-08 | End: 2021-01-08

## 2021-01-08 RX ORDER — HYDRALAZINE HYDROCHLORIDE 20 MG/ML
5 INJECTION INTRAMUSCULAR; INTRAVENOUS EVERY 10 MIN PRN
Status: DISCONTINUED | OUTPATIENT
Start: 2021-01-08 | End: 2021-01-08 | Stop reason: HOSPADM

## 2021-01-08 RX ORDER — FENTANYL CITRATE 50 UG/ML
INJECTION, SOLUTION INTRAMUSCULAR; INTRAVENOUS PRN
Status: DISCONTINUED | OUTPATIENT
Start: 2021-01-08 | End: 2021-01-08 | Stop reason: SDUPTHER

## 2021-01-08 RX ORDER — DIPHENHYDRAMINE HYDROCHLORIDE 50 MG/ML
12.5 INJECTION INTRAMUSCULAR; INTRAVENOUS
Status: DISCONTINUED | OUTPATIENT
Start: 2021-01-08 | End: 2021-01-08 | Stop reason: HOSPADM

## 2021-01-08 RX ORDER — ONDANSETRON 2 MG/ML
INJECTION INTRAMUSCULAR; INTRAVENOUS PRN
Status: DISCONTINUED | OUTPATIENT
Start: 2021-01-08 | End: 2021-01-08 | Stop reason: SDUPTHER

## 2021-01-08 RX ADMIN — FENTANYL CITRATE 100 MCG: 50 INJECTION INTRAMUSCULAR; INTRAVENOUS at 12:12

## 2021-01-08 RX ADMIN — TRAMADOL HYDROCHLORIDE 50 MG: 50 TABLET, FILM COATED ORAL at 03:39

## 2021-01-08 RX ADMIN — INSULIN LISPRO 2 UNITS: 100 INJECTION, SOLUTION INTRAVENOUS; SUBCUTANEOUS at 18:10

## 2021-01-08 RX ADMIN — ROCURONIUM BROMIDE 10 MG: 10 INJECTION INTRAVENOUS at 13:05

## 2021-01-08 RX ADMIN — HYDROMORPHONE HYDROCHLORIDE 0.5 MG: 2 INJECTION INTRAMUSCULAR; INTRAVENOUS; SUBCUTANEOUS at 14:27

## 2021-01-08 RX ADMIN — SODIUM CHLORIDE, PRESERVATIVE FREE 10 ML: 5 INJECTION INTRAVENOUS at 07:59

## 2021-01-08 RX ADMIN — ROCURONIUM BROMIDE 50 MG: 10 INJECTION INTRAVENOUS at 12:15

## 2021-01-08 RX ADMIN — CEFEPIME 2 G: 2 INJECTION, POWDER, FOR SOLUTION INTRAVENOUS at 18:10

## 2021-01-08 RX ADMIN — SODIUM CHLORIDE: 900 INJECTION INTRAVENOUS at 12:05

## 2021-01-08 RX ADMIN — ROCURONIUM BROMIDE 10 MG: 10 INJECTION INTRAVENOUS at 12:35

## 2021-01-08 RX ADMIN — ONDANSETRON 4 MG: 2 INJECTION INTRAMUSCULAR; INTRAVENOUS at 03:43

## 2021-01-08 RX ADMIN — PHENYLEPHRINE HYDROCHLORIDE 200 MCG: 10 INJECTION INTRAVENOUS at 12:23

## 2021-01-08 RX ADMIN — LIDOCAINE HYDROCHLORIDE 100 MG: 20 INJECTION, SOLUTION INTRAVENOUS at 12:15

## 2021-01-08 RX ADMIN — SODIUM CHLORIDE, PRESERVATIVE FREE 10 ML: 5 INJECTION INTRAVENOUS at 21:40

## 2021-01-08 RX ADMIN — CEFEPIME 2 G: 2 INJECTION, POWDER, FOR SOLUTION INTRAVENOUS at 06:37

## 2021-01-08 RX ADMIN — HYDROCODONE BITARTRATE AND ACETAMINOPHEN 1 TABLET: 5; 325 TABLET ORAL at 21:56

## 2021-01-08 RX ADMIN — HYDROMORPHONE HYDROCHLORIDE 0.5 MG: 2 INJECTION INTRAMUSCULAR; INTRAVENOUS; SUBCUTANEOUS at 14:25

## 2021-01-08 RX ADMIN — VANCOMYCIN HYDROCHLORIDE 1750 MG: 5 INJECTION, POWDER, LYOPHILIZED, FOR SOLUTION INTRAVENOUS at 15:55

## 2021-01-08 RX ADMIN — SUGAMMADEX 200 MG: 100 INJECTION, SOLUTION INTRAVENOUS at 14:11

## 2021-01-08 RX ADMIN — TRAMADOL HYDROCHLORIDE 50 MG: 50 TABLET, FILM COATED ORAL at 18:14

## 2021-01-08 RX ADMIN — HYDROMORPHONE HYDROCHLORIDE 0.5 MG: 2 INJECTION INTRAMUSCULAR; INTRAVENOUS; SUBCUTANEOUS at 14:37

## 2021-01-08 RX ADMIN — SODIUM CHLORIDE: 900 INJECTION INTRAVENOUS at 13:20

## 2021-01-08 RX ADMIN — MIDAZOLAM 2 MG: 1 INJECTION INTRAMUSCULAR; INTRAVENOUS at 12:05

## 2021-01-08 RX ADMIN — FENTANYL CITRATE 50 MCG: 50 INJECTION INTRAMUSCULAR; INTRAVENOUS at 14:22

## 2021-01-08 RX ADMIN — ONDANSETRON 4 MG: 2 INJECTION INTRAMUSCULAR; INTRAVENOUS at 14:02

## 2021-01-08 RX ADMIN — PHENYLEPHRINE HYDROCHLORIDE 100 MCG: 10 INJECTION INTRAVENOUS at 13:49

## 2021-01-08 RX ADMIN — FENTANYL CITRATE 50 MCG: 50 INJECTION INTRAMUSCULAR; INTRAVENOUS at 14:19

## 2021-01-08 RX ADMIN — PHENYLEPHRINE HYDROCHLORIDE 100 MCG: 10 INJECTION INTRAVENOUS at 13:34

## 2021-01-08 RX ADMIN — PHENYLEPHRINE HYDROCHLORIDE 100 MCG: 10 INJECTION INTRAVENOUS at 13:44

## 2021-01-08 RX ADMIN — VANCOMYCIN HYDROCHLORIDE 1750 MG: 5 INJECTION, POWDER, LYOPHILIZED, FOR SOLUTION INTRAVENOUS at 01:57

## 2021-01-08 RX ADMIN — PROPOFOL 170 MG: 10 INJECTION, EMULSION INTRAVENOUS at 12:15

## 2021-01-08 ASSESSMENT — PULMONARY FUNCTION TESTS
PIF_VALUE: 15
PIF_VALUE: 14
PIF_VALUE: 15
PIF_VALUE: 14
PIF_VALUE: 15
PIF_VALUE: 1
PIF_VALUE: 15
PIF_VALUE: 1
PIF_VALUE: 15
PIF_VALUE: 15
PIF_VALUE: 17
PIF_VALUE: 15
PIF_VALUE: 14
PIF_VALUE: 15
PIF_VALUE: 15
PIF_VALUE: 25
PIF_VALUE: 15
PIF_VALUE: 25
PIF_VALUE: 14
PIF_VALUE: 15
PIF_VALUE: 15
PIF_VALUE: 14
PIF_VALUE: 15
PIF_VALUE: 15
PIF_VALUE: 14
PIF_VALUE: 15
PIF_VALUE: 16
PIF_VALUE: 14
PIF_VALUE: 15
PIF_VALUE: 0
PIF_VALUE: 0
PIF_VALUE: 14
PIF_VALUE: 15
PIF_VALUE: 23
PIF_VALUE: 15
PIF_VALUE: 14
PIF_VALUE: 15
PIF_VALUE: 14
PIF_VALUE: 14
PIF_VALUE: 15
PIF_VALUE: 15
PIF_VALUE: 0
PIF_VALUE: 1
PIF_VALUE: 15

## 2021-01-08 ASSESSMENT — PAIN DESCRIPTION - PAIN TYPE: TYPE: ACUTE PAIN

## 2021-01-08 ASSESSMENT — PAIN DESCRIPTION - PROGRESSION: CLINICAL_PROGRESSION: GRADUALLY WORSENING

## 2021-01-08 ASSESSMENT — PAIN SCALES - GENERAL: PAINLEVEL_OUTOF10: 10

## 2021-01-08 ASSESSMENT — PAIN DESCRIPTION - ONSET: ONSET: ON-GOING

## 2021-01-08 ASSESSMENT — PAIN DESCRIPTION - FREQUENCY: FREQUENCY: INTERMITTENT

## 2021-01-08 NOTE — H&P
..Update History & Physical    The patient's History and Physical of January 5, 2021 was reviewed with the patient and I examined the patient. There was no change. The surgical site was confirmed by the patient and me. Plan: The risks, benefits, expected outcome, and alternative to the recommended procedure have been discussed with the patient. Patient understands and wants to proceed with the procedure.      Electronically signed by Doni Lang DPM on 1/8/2021 at 10:28 AM

## 2021-01-08 NOTE — ANESTHESIA PRE PROCEDURE
Department of Anesthesiology  Preprocedure Note       Name:  Rosa Cervantes   Age:  36 y.o.  :  1980                                          MRN:  [id number]         Date:  2021      Surgeon: Jordin Sahni):  Samir Mullins DPM    Procedure: Procedure(s):  RIGHT TRANSMETATARSAL TOE AMPUTATION  RIGHT ACHILLES TENDON LENGTHENING REPAIR    Medications prior to admission:   Prior to Admission medications    Medication Sig Start Date End Date Taking? Authorizing Provider   pioglitazone (ACTOS) 15 MG tablet Take 1 tablet by mouth daily 10/1/20 1/4/21 Yes Wilder Arroyo MD   omeprazole (PRILOSEC) 20 MG delayed release capsule TAKE 1 CAPSULE BY MOUTH ONCE DAILY IN THE MORNING 20  Yes Wilder Arroyo MD   lisinopril-hydroCHLOROthiazide (PRINZIDE;ZESTORETIC) 20-12.5 MG per tablet TAKE 1 TABLET BY MOUTH ONCE DAILY 20  Yes Wilder Arroyo MD   metFORMIN (GLUCOPHAGE) 500 MG tablet Take 2 tablets by mouth 2 times daily (with meals) 20 Yes Wilder Arroyo MD   glyBURIDE (DIABETA) 5 MG tablet Take 2 tablets by mouth 2 times daily (with meals) 20 Yes Wilder Arroyo MD   blood glucose monitor kit and supplies Dispense sufficient amount for indicated testing frequency plus additional to accommodate PRN testing needs. Dispense all needed supplies to include: monitor, strips, lancing device, lancets, control solutions, alcohol swabs. 6/15/20  Yes Gianluca Harper MD   blood glucose monitor strips Test 2 times a day & as needed for symptoms of irregular blood glucose. Dispense sufficient amount for indicated testing frequency plus additional to accommodate PRN testing needs.  6/15/20  Yes Gianluca Harper MD   FreeStyle Lancets MISC 1 each by Does not apply route daily 6/15/20  Yes Gianluca Harper MD   aspirin 81 MG tablet Take 81 mg by mouth daily   Yes Historical Provider, MD   acetaminophen (TYLENOL) 325 MG tablet Take 2 tablets by mouth every 4 hours as needed for Pain or Fever 2/28/18  Yes Carol Mcneal MD       Current medications:    Current Facility-Administered Medications   Medication Dose Route Frequency Provider Last Rate Last Admin    meperidine (DEMEROL) injection 12.5 mg  12.5 mg Intravenous Q5 Min PRN Curry Holden, DO        HYDROmorphone (DILAUDID) injection 0.5 mg  0.5 mg Intravenous Q5 Min PRN Curry Holden, DO        fentaNYL (SUBLIMAZE) injection 50 mcg  50 mcg Intravenous Q5 Min PRN Curry Holden, DO        HYDROcodone-acetaminophen [facility]) 5-325 MG per tablet 1 tablet  1 tablet Oral PRN Curry Holden, DO        Or    HYDROcodone-acetaminophen (NORCO) 5-325 MG per tablet 2 tablet  2 tablet Oral PRN Curry Holden, DO        promethazine (PHENERGAN) injection 6.25 mg  6.25 mg Intravenous Once PRN Curry Holden, DO        diphenhydrAMINE (BENADRYL) injection 12.5 mg  12.5 mg Intravenous Once PRN Curry Holden, DO        labetalol (NORMODYNE;TRANDATE) injection 5 mg  5 mg Intravenous Q10 Min PRN Curry Holden, DO        hydrALAZINE (APRESOLINE) injection 5 mg  5 mg Intravenous Q10 Min PRN Curry Holden, DO        vancomycin (VANCOCIN) 1,750 mg in dextrose 5 % 500 mL IVPB  1,750 mg Intravenous Q12H Tana Tapia MD   Stopped at 01/08/21 0428    aspirin chewable tablet 81 mg  81 mg Oral Daily RAFAEL Walker - CNP   81 mg at 01/07/21 0909    lisinopril-hydroCHLOROthiazide (PRINZIDE;ZESTORETIC) 20-12.5 MG per tablet 1 tablet  1 tablet Oral Daily Hansa RAFAEL Hillman - CNP   1 tablet at 01/07/21 0909    pantoprazole (PROTONIX) tablet 40 mg  40 mg Oral QAM AC RAFAEL Walker - CNP   40 mg at 01/07/21 0647    insulin lispro (HUMALOG) injection vial 0-12 Units  0-12 Units Subcutaneous TID WC RAFAEL Walker CNP   2 Units at 01/06/21 1709    insulin lispro (HUMALOG) injection vial 0-6 Units  0-6 Units Subcutaneous Nightly Hansa Kady, APRN - CNP   1 Units at 01/07/21 0995    glucose (GLUTOSE) 40 % oral gel 15 g  15 g Oral PRN Stephanie Casillas, APRN - CNP        dextrose 50 % IV solution  12.5 g Intravenous PRN Stephanie Casillas, APRN - CNP        glucagon (rDNA) injection 1 mg  1 mg Intramuscular PRN Stephanie Casillas, APRN - CNP        dextrose 5 % solution  100 mL/hr Intravenous PRN Nicole Soulier, APRN - CNP        sodium chloride flush 0.9 % injection 10 mL  10 mL Intravenous 2 times per day Nicole Soulier, APRN - CNP   10 mL at 01/08/21 0759    sodium chloride flush 0.9 % injection 10 mL  10 mL Intravenous PRN Nicole Soulier, APRN - CNP   10 mL at 01/07/21 0102    enoxaparin (LOVENOX) injection 40 mg  40 mg Subcutaneous Daily Nicole Soulier, APRN - CNP   40 mg at 01/07/21 0909    promethazine (PHENERGAN) tablet 12.5 mg  12.5 mg Oral Q6H PRN Nicole Souarleneer, APRN - CNP        Or    ondansetron (ZOFRAN) injection 4 mg  4 mg Intravenous Q6H PRN Nicole Soulier, APRN - CNP   4 mg at 01/08/21 0343    polyethylene glycol (GLYCOLAX) packet 17 g  17 g Oral Daily PRN Nicole Soulier, APRN - CNP        acetaminophen (TYLENOL) tablet 650 mg  650 mg Oral Q6H PRN Nicole Soulier, APRN - CNP        Or    acetaminophen (TYLENOL) suppository 650 mg  650 mg Rectal Q6H PRN Nicole Souarleneer, APRN - CNP        cefepime (MAXIPIME) 2 g IVPB minibag  2 g Intravenous Q8H Nicole Soulier, APRN - CNP   Stopped at 01/08/21 0738    traMADol (ULTRAM) tablet 50 mg  50 mg Oral Q6H PRN Nicole Soulier, APRN - CNP   50 mg at 01/08/21 6812       Allergies:  No Known Allergies    Problem List:    Patient Active Problem List   Diagnosis Code    Unstable angina ([facility] [address] I20.0    Lumbar back pain with radiculopathy affecting left lower extremity M54.16    S/P lumbar laminectomy Z98.890    Type 2 diabetes mellitus with diabetic neuropathy, without long-term current use of insulin (HCC) E11.40    Essential hypertension I10    Lumbar radiculopathy M54.16    Gastroesophageal reflux disease without esophagitis K21.9    Septic embolism (HCC) I76    Diabetic ulcer of right midfoot associated with type 2 diabetes mellitus, with fat layer exposed ([facility] [address] E11.621, L97.412    WD-Cellulitis of foot right L03.119    Diabetic foot ulcer with osteomyelitis ([facility] [address] E11.621, E11.69, L97.509, M86.9       Past Medical History:        Diagnosis Date    Callus of foot     Right foot - see's Dr. Magnolia Bianchi Dizziness     positional    Essential hypertension     Follows with PCP    Lumbar radiculopathy        Past Surgical History:        Procedure Laterality Date    CARDIAC CATHETERIZATION  03/2018    [facility]    DENTAL SURGERY      teeth extractions -half per patient    HERNIA REPAIR Bilateral 5/27/2020    BIALTERAL HERNIA INGUINAL REPAIR performed by Cami Ch MD at [address] [place] [address]  2018    [facility] OFFICE/OUTPT VISIT,PROCEDURE ONLY Left 4/17/2018    L5-S1 HEMILAMINECTOMY, REMOVAL OF [address] LEFT SIDE performed by Oscar Washington MD at [address] [place] [address] History:    Social History     Tobacco Use    Smoking status: Never Smoker    Smokeless tobacco: Never Used   Substance Use Topics    Alcohol use: Yes     Comment: \"couple beers a night\"                                Counseling given: Not Answered      Vital Signs (Current):   Vitals:    01/07/21 0813 01/07/21 1629 01/07/21 2027 01/08/21 0849   BP: 117/81 109/78 108/72 124/81   Pulse: 91 95 95 92   Resp: 19 15 16 16   Temp: 36.8 °C (98.3 °F) 36.9 °C (98.5 °F) 36.7 °C (98 °F) 36.8 °C (98.3 °F)   TempSrc: Oral Oral Oral Oral   SpO2: 98% 98% 98% 97%   Weight:       Height:                                                  BP Readings from Last 3 Encounters:   01/08/21 124/81   12/22/20 (!) 153/79   12/15/20 125/79       NPO Status: Time of last liquid consumption: 0200                        Time of last solid consumption: 1800                        Date of last liquid consumption: 01/08/21                        Date of last solid food consumption: 01/07/21    BMI:   Wt Readings from Last 3 Encounters:   01/06/21 163 lb 4.8 oz (74.1 kg)   12/15/20 175 lb (79.4 kg)   09/23/20 181 lb (82.1 kg)     Body mass index is 22.15 kg/m².     CBC:   Lab Results   Component Value Date    WBC 7.0 01/08/2021    RBC 4.29 01/08/2021    HGB 13.0 01/08/2021    HCT 40.0 01/08/2021    MCV 93.2 01/08/2021    RDW 11.7 01/08/2021     01/08/2021       CMP:   Lab Results   Component Value Date     01/08/2021    K 4.6 01/08/2021    K 3.8 03/14/2018    CL 95 01/08/2021    CO2 28 01/08/2021    BUN 11 01/08/2021    CREATININE 0.8 01/08/2021    GFRAA >60 01/08/2021    AGRATIO 1.4 03/12/2020    LABGLOM >60 01/08/2021    GLUCOSE 136 01/08/2021    PROT 7.8 01/05/2021    CALCIUM 8.8 01/08/2021    BILITOT 0.4 01/05/2021    ALKPHOS 50 01/05/2021    AST 15 01/05/2021    ALT 12 01/05/2021       POC Tests:   Recent Labs     01/08/21  0759   POCGLU 147*       Coags:   Lab Results   Component Value Date    PROTIME 12.3 06/14/2020    INR 1.02 06/14/2020    APTT 28.2 06/14/2020       HCG (If Applicable): No results found for: PREGTESTUR, PREGSERUM, HCG, HCGQUANT     ABGs: No results found for: PHART, PO2ART, ZRQ3ITW, NQD8ENL, BEART, X1TMYQPD     Type & Screen (If Applicable):  Lab Results   Component Value Date    LABRH POS 04/17/2018       Drug/Infectious Status (If Applicable):  No results found for: HIV, HEPCAB    COVID-19 Screening (If Applicable):   Lab Results   Component Value Date    COVID19 NOT DETECTED 01/08/2021    COVID19 NOT DETECTED 12/24/2020         Anesthesia Evaluation  Patient summary reviewed no history of anesthetic complications:   Airway: Mallampati: II  TM distance: >3 FB   Neck ROM: full  Mouth opening: > = 3 FB Dental:      Comment: Very poor dentition, multiple missing teeth    Pulmonary:normal exam                               Cardiovascular:    (+) hypertension:, angina:,          Beta Blocker:  Not on Beta Blocker      ROS comment: Echo 6/2020:  Summary   Left ventricular systolic function is abnormal.   Ejection fraction is visually estimated at 50-55%. Anteroseptal wall appears hypokinetic. No evidence of any pericardial effusion. Stress test 2/2018:  Summary   No EKG changes suggestive of ischemia with Lexiscan infusion.   Normal perfusion uptake noted   no reversible ischemia noted   gating shows EF 48%         Neuro/Psych:   Negative Neuro/Psych ROS              GI/Hepatic/Renal:   (+) GERD:,           Endo/Other:    (+) DiabetesType II DM, , .                 Abdominal:           Vascular: negative vascular ROS. Anesthesia Plan      general     ASA 3       Induction: intravenous. Anesthetic plan and risks discussed with patient. RAFAEL Adame CRNA   1/8/2021      Pre Anesthesia Evaluation complete. Anesthesia plan, risks, benefits, alternatives, and personnel discussed with patient and/or legal guardian. Patient and/or legal guardian verbalized an understanding and agreed to proceed. Anesthesia plan discussed with care team members and agreed upon.   RAFAEL Adame CRNA  1/8/2021

## 2021-01-08 NOTE — CONSULTS
[address] [place] [address], [facility]                                  CONSULTATION    PATIENT NAME: Ruth Esquivel                      :        1980  MED REC NO:   [id number]                          ROOM:       4115  ACCOUNT NO:   [de-identified]                           ADMIT DATE: 2021  PROVIDER:     Hafsa Delgado DPM    CONSULT DATE:  2021    HISTORY OF PRESENT ILLNESS:  The patient is a very pleasant 79-year-old  gentleman that is seen at bedside for diabetic ulceration and  osteomyelitis to the right lower extremity. It should be noted that I  did get a call and talked to Dr. Anahi Schultz today about this gentleman and  also Dr. Lelia Judge who said that he was good to go as far as the  circulatory function. The patient is seen at bedside. The patient is  doing well. PHYSICAL EXAMINATION:  VITAL SIGNS:  Show that his temperature is 98.5, respirations 15, pulse  95, blood pressure 109/78. EXTREMITIES:  He has a dopplerable DP, PT pulse. The patient does have  palpable pulses as well. The patient does have an ulceration at this  time that is approximately 1.2 cm in length and 8 mm in width and 2-4 mm  in depth to the right foot. The patient does have some warmth  associated to the site. There is discoloration as far as redness noted  as well. The patient had no purulence noted to the site. The patient  does have the tight shiny, dry atrophic skin changes. NEUROLOGIC:  Diminished epicritic sensation. MUSCULOSKELETAL:  The patient does have significant contracture to  digits 1 through 5 bilateral.    LABORATORY DATA:  The patient's current labs show that his most recent  white count was 8.4. IMAGING STUDIES:  Did show that he had significant changes to the third  metatarsal, possible second, and possible fourth metatarsal as well, and  significant fluid collection was organized as well.     ASSESSMENT: 1.  Osteomyelitis to the right foot. 2.  Ulceration with necrosis to the bone, right foot. 3.  Diabetic with neuropathy. 4.  Diabetic, poorly controlled. TREATMENT:  The patient at this time is educated about his condition. We do have Dr. Irma Moya who said that the blood flow should be adequate to  heal.  I did tell the patient at this point that we could do a  transmetatarsal amputation with a tendo Achilles lengthening procedure  but is purely a limb salvage type of procedure. The extension of the  infection to the third metatarsal is significant. The patient at this  point understands that we are going to do a transmetatarsal amputation  and do six weeks of IV antibiotics. I have ID help us with that. I  will send out good cultures tomorrow. The patient also understands that  we are going to try to get him set up for hyperbaric oxygen therapy. The patient understands that he is not going to have to be strict 100%  nonweightbearing. I did tell him specifically that this is purely a  limb salvage procedure. If at any point we think that it is not going  to work, then I will notify him and let him know that he would possibly  need a BK amputation. The patient understands and is wishing to move  forward with the transmetatarsal amputation and the PING and we will have  him set for tomorrow at 10:00 a.m. I made him n.p.o. after midnight and  already talked to Dr. Per Toure and also Dr. Irma Moya about this.         Ngozi Weller DPM    D: 01/07/2021 18:02:24       T: 01/07/2021 18:11:15     [email or web address]  Job#: [id number]     Doc#: [id number]    CC:

## 2021-01-08 NOTE — CONSULTS
PHARMACY [facility] HOSPITAL [facility] SERVICE    Syd De La Cruz is a 36 y.o. male started on vancomycin for diabetic foot ulcer with osteomyelitis. Pharmacy consulted by Dr. Chelsea Treviño / NP Luanne Yuan for monitoring and adjustment. Indication for treatment: Diabetic foot ulcer with osteomyelitis  Goal trough: 15 mcg/mL  Other antimicrobials: Cefepime    Ht Readings from Last 1 Encounters:   01/04/21 6' (1.829 m)     Wt Readings from Last 3 Encounters:   01/06/21 163 lb 4.8 oz (74.1 kg)   12/15/20 175 lb (79.4 kg)   09/23/20 181 lb (82.1 kg)        Pertinent Laboratory Values:   Temp Readings from Last 3 Encounters:   01/08/21 97.2 °F (36.2 °C) (Oral)   12/24/20 97.1 °F (36.2 °C)   12/22/20 97.8 °F (36.6 °C) (Temporal)     Recent Labs     01/07/21  0245 01/08/21  0527   WBC 8.4 7.0     Recent Labs     01/07/21  0245 01/08/21  0527   BUN 9 11   CREATININE 0.7* 0.8*     Estimated Creatinine Clearance: 129 mL/min (A) (based on SCr of 0.8 mg/dL (L)). 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 Assessment/Plan:  · WBC has been normal, pt afebrile  · SCR remains normal, no UOP data  · Osteo of foot, amputation of toe yesterday  · Vancomyicn day #5  · Vancomycin level subtherapeutic yesterday, vancomycin dose increased  · Renal trends remain normal today  · Continue increased dose vancomycin 1,750mg ivpb q12h  · Recheck the trough before the 4th 1,750mg dose tonight around midnight  · Pharmacy will continue to follow renal trends and adjust the vancomycin dose as needed.     VANCOMYCIN TROUGH SCHEDULED FOR 1/9 @ 00:30    Thank you for the consult. Priscilla Mohamud  1/8/2021 4:14 PM

## 2021-01-08 NOTE — PROGRESS NOTES
Hospitalist Progress Note      Name:  Royal Pickard /Age/Sex: 1980  (36 y.o. male)   MRN & CSN:  [id number] & [id number] Admission Date/Time: 2021  3:24 PM   Location:  OR/NONE PCP: Delano Nuñez MD         Hospital Day: 5    Assessment and Plan:   Royal Pickard is a 36 y.o.  male  who presents with Diabetic foot ulcer with osteomyelitis ([facility] [address]    Diabetic foot: Right  · Afebrile, WBC normal.  · Status post right transmetatarsal toe amputation, right Achilles tendon lengthening repair 2021. · Blood culture no growth. · Surgical call culture sent  · Currently on cefepime and vancomycin. · Podiatry on consult  · Infectious disease on consult    Type 2 DM: Last A1C 7.8. Sliding scale. Hypoglycemia protocol. Accucheck. Hold oral hypoglycemic agents for now    Hypertension: Blood pressure controlled. Continue medications. Lisinopril hydrochlorothiazide. Hyponatremia: Sodium 132. Diet Diet NPO, After Midnight   DVT Prophylaxis [x] Lovenox, []  Heparin, [] SCDs, [] Warfarin  [] NOAC     GI Prophylaxis [x] PPI,  [] H2 Blocker,  [] Carafate,  [] Diet/Tube Feeds   Code Status Full Code   MDM [] Low, [] Moderate,[x]  High     History of Present Illness:     Chief Complaint: Diabetic foot ulcer with osteomyelitis ([facility] [address]    Seen and examined today. Complaining of severe pain in the right foot. Ten point ROS reviewed negative, unless as noted above    Objective: Intake/Output Summary (Last 24 hours) at 2021 1510  Last data filed at 2021 1418  Gross per 24 hour   Intake 510 ml   Output 20 ml   Net 490 ml      Vitals:   Vitals:    21 1445   BP: 127/86   Pulse: 90   Resp: 12   Temp:    SpO2: 100%     Physical Exam:   GEN Awake male, sitting upright in bed in no apparent distress. Appears given age. EYES Pupils are equally round. No scleral erythema, discharge, or conjunctivitis.   HENT Mucous membranes are moist. Oral pharynx without exudates, no evidence of thrush. NECK Supple, no apparent thyromegaly or masses. RESP Clear to auscultation, no wheezes, rales or rhonchi. Symmetric chest movement while on room air. CARDIO/VASC S1/S2 auscultated. Regular rate without appreciable murmurs, rubs, or gallops. No JVD or carotid bruits. Peripheral pulses equal bilaterally and palpable. No peripheral edema. GI Abdomen is soft without significant tenderness, masses, or guarding. Bowel sounds are normoactive. Rectal exam deferred. MSK right foot with dressing. SKIN Normal coloration, warm, dry. NEURO Cranial nerves appear grossly intact, normal speech, no lateralizing weakness. PSYCH Awake, alert, oriented x 4. Affect appropriate.     Medications:   Medications:    vancomycin  1,750 mg Intravenous Q12H    aspirin  81 mg Oral Daily    lisinopril-hydroCHLOROthiazide  1 tablet Oral Daily    pantoprazole  40 mg Oral QAM AC    insulin lispro  0-12 Units Subcutaneous TID WC    insulin lispro  0-6 Units Subcutaneous Nightly    sodium chloride flush  10 mL Intravenous 2 times per day    enoxaparin  40 mg Subcutaneous Daily    cefepime  2 g Intravenous Q8H      Infusions:    dextrose       PRN Meds:     meperidine, 12.5 mg, Q5 Min PRN      HYDROmorphone, 0.5 mg, Q5 Min PRN      fentanNYL, 50 mcg, Q5 Min PRN      HYDROcodone 5 mg - acetaminophen, 1 tablet, PRN    Or      HYDROcodone 5 mg - acetaminophen, 2 tablet, PRN      promethazine, 6.25 mg, Once PRN      diphenhydrAMINE, 12.5 mg, Once PRN      labetalol, 5 mg, Q10 Min PRN      hydrALAZINE, 5 mg, Q10 Min PRN      glucose, 15 g, PRN      dextrose, 12.5 g, PRN      glucagon (rDNA), 1 mg, PRN      dextrose, 100 mL/hr, PRN      sodium chloride flush, 10 mL, PRN      promethazine, 12.5 mg, Q6H PRN    Or      ondansetron, 4 mg, Q6H PRN      polyethylene glycol, 17 g, Daily PRN      acetaminophen, 650 mg, Q6H PRN    Or      acetaminophen, 650 mg, Q6H PRN      traMADol, 50 mg, Q6H PRN      Recent Labs 01/07/21 0245 01/08/21 0527   WBC 8.4 7.0   HGB 11.6* 13.0*   HCT 35.0* 40.0*   * 538*      Recent Labs     01/07/21 0245 01/08/21 0527   * 132*   K 4.5 4.6   CL 96* 95*   CO2 19* 28   BUN 9 11   CREATININE 0.7* 0.8*       Recent Labs     01/07/21  0753 01/07/21  1029 01/08/21 0527   TROPONINT <0.010 <0.010 <0.010      Imaging reviewed    Electronically signed by Clare Madrigal MD on 1/8/2021 at 3:10 PM

## 2021-01-08 NOTE — BRIEF OP NOTE
Brief Postoperative Note      Patient: Cherie Verde  YOB: 1980  MRN: [id number]    Date of Procedure: 1/8/2021    Pre-Op Diagnosis: OSTEOMYELITIS RIGHT FOOT, ABSCESS RIGHT FOOT, DIABETIC WITH NEUROPATHY    Post-Op Diagnosis: Same       Procedure(s):  RIGHT TRANSMETATARSAL TOE AMPUTATION  RIGHT ACHILLES TENDON LENGTHENING REPAIR    Surgeon(s):  Esme Ahuja DPM    Assistant:  * No surgical staff found *    Anesthesia: General    Estimated Blood Loss (mL): 10 ml    Complications: None    Specimens:   ID Type Source Tests Collected by Time Destination   1 : Culture Third Metatarsal Right Foot Specimen Foot CULTURE, SURGICAL Esme Ahuja DPM 1/8/2021 1240    A : Right Third Metatarsal Specimen 1 Specimen Foot SURGICAL PATHOLOGY Esme Ahuja DPM 1/8/2021 1251    B : Right Third Metatarsal Specimen 2, Subquential Cut Specimen Foot SURGICAL PATHOLOGY Esme Ahuja DPM 1/8/2021 1255    C : Right Third Metatarsal Specimen 3, Second Subquential Cut Specimen Foot SURGICAL PATHOLOGY Esme Ahuja DPM 1/8/2021 1257    D : Right Foot Transmetatarsal Amputation Specimen Foot SURGICAL PATHOLOGY Esme Ahuja DPM 1/8/2021 1301        Implants:  * No implants in log *      Drains: * No LDAs found *    Findings: as expected     Electronically signed by Esme Ahuja DPM on 1/8/2021 at 2:19 PM

## 2021-01-08 NOTE — PROGRESS NOTES
1431: Patient arrived to PACU from OR. Monitors applied, alarms on. VS stable. Surgical dressing is clean, dry and intact. Right lower ext. elevated. Patient complaining of pain on arrival to PACU, patient medicated by Pura Gilbert on arrival. Report obtained from [facility] and Debbie Mishra. CRNA.   [phone]: Patients mother called, updated on patient status. 1510: Patient turned and repositioned in bed, tolerated well. 1515: Patient tolerating ice chips at this time. 1530: Report called to Denis Demarco for room 0910.   [phone]: Patient transferred to room [phone] by transport staff.

## 2021-01-08 NOTE — H&P
..The patient was counseled at length about the risks of eliud Covid-19 during their perioperative period and any recovery window from their procedure. The patient was made aware that eliud Covid-19  may worsen their prognosis for recovering from their procedure  and lend to a higher morbidity and/or mortality risk. All material risks, benefits, and reasonable alternatives including postponing the procedure were discussed. The patient does wish to proceed with the procedure at this time.

## 2021-01-08 NOTE — CARE COORDINATION
Talked with patient who just back from surgery. He states that he really wants to go home with a wheelchair, [facility] and a PICC for his IV antx for 6 weeks. Right now he states he does not have anyone teachable for the IV antx so he is going to work on that because he wants to go home. He understands he is 100% non weight bearing. He knows that Swing bed in [facility] is a possible option.  CM will follow

## 2021-01-09 LAB
ANION GAP SERPL CALCULATED.3IONS-SCNC: 10 MMOL/L (ref 4–16)
BUN BLDV-MCNC: 11 MG/DL (ref 6–23)
CALCIUM SERPL-MCNC: 8 MG/DL (ref 8.3–10.6)
CHLORIDE BLD-SCNC: 98 MMOL/L (ref 99–110)
CO2: 24 MMOL/L (ref 21–32)
CREAT SERPL-MCNC: 0.8 MG/DL (ref 0.9–1.3)
CULTURE: NORMAL
CULTURE: NORMAL
DOSE AMOUNT: NORMAL
DOSE TIME: NORMAL
GFR AFRICAN AMERICAN: >60 ML/MIN/1.73M2
GFR NON-AFRICAN AMERICAN: >60 ML/MIN/1.73M2
GLUCOSE BLD-MCNC: 119 MG/DL (ref 70–99)
GLUCOSE BLD-MCNC: 136 MG/DL (ref 70–99)
GLUCOSE BLD-MCNC: 137 MG/DL (ref 70–99)
GLUCOSE BLD-MCNC: 142 MG/DL (ref 70–99)
GLUCOSE BLD-MCNC: 146 MG/DL (ref 70–99)
HCT VFR BLD CALC: 35.1 % (ref 42–52)
HEMOGLOBIN: 11.4 GM/DL (ref 13.5–18)
Lab: NORMAL
Lab: NORMAL
MCH RBC QN AUTO: 30.2 PG (ref 27–31)
MCHC RBC AUTO-ENTMCNC: 32.5 % (ref 32–36)
MCV RBC AUTO: 93.1 FL (ref 78–100)
PDW BLD-RTO: 11.6 % (ref 11.7–14.9)
PLATELET # BLD: 461 K/CU MM (ref 140–440)
PMV BLD AUTO: 9.1 FL (ref 7.5–11.1)
POTASSIUM SERPL-SCNC: 4.2 MMOL/L (ref 3.5–5.1)
RBC # BLD: 3.77 M/CU MM (ref 4.6–6.2)
SODIUM BLD-SCNC: 132 MMOL/L (ref 135–145)
SPECIMEN: NORMAL
SPECIMEN: NORMAL
TROPONIN T: <0.01 NG/ML
TROPONIN T: <0.01 NG/ML
VANCOMYCIN TROUGH: 16.7 UG/ML (ref 10–20)
WBC # BLD: 10.5 K/CU MM (ref 4–10.5)

## 2021-01-09 PROCEDURE — 85027 COMPLETE CBC AUTOMATED: CPT

## 2021-01-09 PROCEDURE — 6360000002 HC RX W HCPCS: Performed by: PODIATRIST

## 2021-01-09 PROCEDURE — 82962 GLUCOSE BLOOD TEST: CPT

## 2021-01-09 PROCEDURE — 2580000003 HC RX 258: Performed by: PODIATRIST

## 2021-01-09 PROCEDURE — 80202 ASSAY OF VANCOMYCIN: CPT

## 2021-01-09 PROCEDURE — 6370000000 HC RX 637 (ALT 250 FOR IP): Performed by: PODIATRIST

## 2021-01-09 PROCEDURE — 94761 N-INVAS EAR/PLS OXIMETRY MLT: CPT

## 2021-01-09 PROCEDURE — 1200000000 HC SEMI PRIVATE

## 2021-01-09 PROCEDURE — 99254 IP/OBS CNSLTJ NEW/EST MOD 60: CPT | Performed by: INTERNAL MEDICINE

## 2021-01-09 PROCEDURE — 84484 ASSAY OF TROPONIN QUANT: CPT

## 2021-01-09 PROCEDURE — 36415 COLL VENOUS BLD VENIPUNCTURE: CPT

## 2021-01-09 PROCEDURE — 80048 BASIC METABOLIC PNL TOTAL CA: CPT

## 2021-01-09 PROCEDURE — 6370000000 HC RX 637 (ALT 250 FOR IP): Performed by: PHYSICIAN ASSISTANT

## 2021-01-09 RX ORDER — LISINOPRIL 20 MG/1
20 TABLET ORAL DAILY
Status: DISCONTINUED | OUTPATIENT
Start: 2021-01-10 | End: 2021-01-13 | Stop reason: HOSPADM

## 2021-01-09 RX ADMIN — HYDROCODONE BITARTRATE AND ACETAMINOPHEN 1 TABLET: 5; 325 TABLET ORAL at 20:38

## 2021-01-09 RX ADMIN — PANTOPRAZOLE SODIUM 40 MG: 40 TABLET, DELAYED RELEASE ORAL at 05:16

## 2021-01-09 RX ADMIN — SODIUM CHLORIDE, PRESERVATIVE FREE 10 ML: 5 INJECTION INTRAVENOUS at 08:23

## 2021-01-09 RX ADMIN — CEFEPIME 2 G: 2 INJECTION, POWDER, FOR SOLUTION INTRAVENOUS at 18:04

## 2021-01-09 RX ADMIN — CEFEPIME 2 G: 2 INJECTION, POWDER, FOR SOLUTION INTRAVENOUS at 10:01

## 2021-01-09 RX ADMIN — SODIUM CHLORIDE, PRESERVATIVE FREE 10 ML: 5 INJECTION INTRAVENOUS at 20:24

## 2021-01-09 RX ADMIN — TRAMADOL HYDROCHLORIDE 50 MG: 50 TABLET, FILM COATED ORAL at 01:17

## 2021-01-09 RX ADMIN — HYDROCODONE BITARTRATE AND ACETAMINOPHEN 1 TABLET: 5; 325 TABLET ORAL at 06:32

## 2021-01-09 RX ADMIN — TRAMADOL HYDROCHLORIDE 50 MG: 50 TABLET, FILM COATED ORAL at 10:01

## 2021-01-09 RX ADMIN — CEFEPIME 2 G: 2 INJECTION, POWDER, FOR SOLUTION INTRAVENOUS at 03:58

## 2021-01-09 RX ADMIN — HYDROCODONE BITARTRATE AND ACETAMINOPHEN 1 TABLET: 5; 325 TABLET ORAL at 13:00

## 2021-01-09 RX ADMIN — LISINOPRIL AND HYDROCHLOROTHIAZIDE 1 TABLET: 12.5; 2 TABLET ORAL at 08:22

## 2021-01-09 RX ADMIN — VANCOMYCIN HYDROCHLORIDE 1750 MG: 5 INJECTION, POWDER, LYOPHILIZED, FOR SOLUTION INTRAVENOUS at 12:52

## 2021-01-09 RX ADMIN — PROMETHAZINE HYDROCHLORIDE 12.5 MG: 12.5 TABLET ORAL at 20:23

## 2021-01-09 RX ADMIN — INSULIN LISPRO 2 UNITS: 100 INJECTION, SOLUTION INTRAVENOUS; SUBCUTANEOUS at 12:54

## 2021-01-09 RX ADMIN — ENOXAPARIN SODIUM 40 MG: 40 INJECTION SUBCUTANEOUS at 08:22

## 2021-01-09 RX ADMIN — ASPIRIN 81 MG CHEWABLE TABLET 81 MG: 81 TABLET CHEWABLE at 08:22

## 2021-01-09 RX ADMIN — TRAMADOL HYDROCHLORIDE 50 MG: 50 TABLET, FILM COATED ORAL at 18:01

## 2021-01-09 RX ADMIN — VANCOMYCIN HYDROCHLORIDE 1750 MG: 5 INJECTION, POWDER, LYOPHILIZED, FOR SOLUTION INTRAVENOUS at 01:10

## 2021-01-09 ASSESSMENT — PAIN SCALES - GENERAL
PAINLEVEL_OUTOF10: 10
PAINLEVEL_OUTOF10: 5
PAINLEVEL_OUTOF10: 7
PAINLEVEL_OUTOF10: 5
PAINLEVEL_OUTOF10: 7

## 2021-01-09 ASSESSMENT — PAIN - FUNCTIONAL ASSESSMENT: PAIN_FUNCTIONAL_ASSESSMENT: PREVENTS OR INTERFERES SOME ACTIVE ACTIVITIES AND ADLS

## 2021-01-09 ASSESSMENT — PAIN DESCRIPTION - DESCRIPTORS
DESCRIPTORS: ACHING;SHARP
DESCRIPTORS: SHARP

## 2021-01-09 ASSESSMENT — PAIN DESCRIPTION - ORIENTATION
ORIENTATION: RIGHT
ORIENTATION: RIGHT

## 2021-01-09 ASSESSMENT — PAIN DESCRIPTION - PROGRESSION: CLINICAL_PROGRESSION: GRADUALLY WORSENING

## 2021-01-09 ASSESSMENT — PAIN DESCRIPTION - PAIN TYPE: TYPE: ACUTE PAIN

## 2021-01-09 ASSESSMENT — PAIN DESCRIPTION - LOCATION
LOCATION: FOOT
LOCATION: FOOT

## 2021-01-09 ASSESSMENT — PAIN DESCRIPTION - FREQUENCY: FREQUENCY: INTERMITTENT

## 2021-01-09 NOTE — CONSULTS
Infectious Disease Consult Note  2021   Patient Name: Gita Jiang : 1980   Impression   Right foot diabetic osteomyelitis  o Status post transmetatarsal amputation on 2021. o Possibly Staph, Strep or GNB   Type 2 diabetes mellitus (hemoglobin A1c 7.8%, on 2021)   Multi-morbidity: per PMHx lumbar radiculopathy  Plan:   Therapeutic: Continue vancomycin and cefepime   Diagnostic: trend CRP, ESR   F/u test:    Follow-up bone culture and streamline antibiotics. Anticipate a 6-week course of intravenous antibiotics. Thank you for allowing me to consult in the care of this patient.  ------------------------  REASON FOR CONSULT: Infective syndrome   Requested by: Dr. Lula Mcclellan is a 36 y.o.  male with type 2 diabetes mellitus and peripheral neuropathy, essential hypertension, lumbar radiculopathy status post lumbar made neck to me at the L5-S1 level, who was admitted 2021 for further evaluation and management of right foot swelling. Apparently the patient had a wound on his right foot and sees Dr. Kacie Silvestre. He noticed worsening redness over the course of the week. In the emergency department right foot x-ray showed erosion of the distal third metatarsal on proximal aspect of the toe total proximal phalanx. Reported to be compatible with osteomyelitis. He was admitted. He received intravenous vancomycin and cefepime. An MRI showed septic arthritis of the right third metatarsophalangeal joint with joint effusions compatible with abscess of the right third toe. There is also edema of the second metatarsal head and edema of the fourth metatarsal head with preserved T1 signal which may reflect noninfectious cystitis. There was destruction of the distal most 12 flexor and extensor tendons. Right lower extremity arterial Doppler showed mild atherosclerotic plaque but no arterial occlusion. Clinical diagnosis of right foot diabetic foot osteomyelitis was made.   He History   Problem Relation Age of Onset    Heart Disease Father     Diabetes Father     Diabetes Mother       Infectious disease related family history - not contibutory. 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  No recent unusual exposures.  NO pets    ? ALLERGIES  No Known Allergies   MEDICATIONS  Reviewed and are per the chart/EMR. IMMUNIZATION HISTORY  Immunization History   Administered Date(s) Administered    Influenza A (S0X2-20) Vaccine PF IM 10/28/2009    Influenza Virus Vaccine 10/12/2018    Influenza, Paullette Felty, IM, PF (6 mo and older Fluzone, Flulaval, Fluarix, and 3 yrs and older Afluria) 10/23/2019, 09/23/2020    Tdap (Boostrix, Adacel) 11/04/2016     ? Antibiotics:   Vancomycin   Cefepime  ?  -------------------------------------------------------------------------------------------------------------------    Vital Signs:  Vitals:    01/09/21 0500   BP: 123/77   Pulse: 95   Resp: 16   Temp: 98.8 °F (37.1 °C)   SpO2: 97%         Exam:    VS: noted; wt 74.1 kg  Gen: alert and oriented X3,   Skin: no stigmata of endocarditis  Wounds: right foot dressing C/D/I  HEMT: AT/NC Oropharynx pink, moist, and without lesions or exudates; dentition in good state of repair  Eyes: PERRLA, EOMI, conjunctiva pink, sclera anicteric. Neck: Supple. Trachea midline. No LAD. Chest: no distress and CTA. Good air movement. Heart: RRR and no MRG. Abd: soft, non-distended, no tenderness, no hepatomegaly. Normoactive bowel sounds. Ext: no clubbing, cyanosis, or edema  Catheter Site: without erythema or tenderness  LDA:   Neuro: Mental status intact.  CN 2-12 intact and no focal sensory or motor deficits    ? Diagnostic Studies: reviewed  ? ? I have examined this patient and available medical records on this date and have made the above observations, conclusions and recommendations.   Electronically signed by: Electronically signed by Jenna Dowd MD on 1/9/2021 at 8:03 AM

## 2021-01-09 NOTE — PLAN OF CARE
Problem: Falls - Risk of:  Goal: Will remain free from falls  Description: Will remain free from falls  Outcome: Ongoing  Goal: Absence of physical injury  Description: Absence of physical injury  Outcome: Ongoing     Problem: Safety:  Goal: Free from accidental physical injury  Description: Free from accidental physical injury  Outcome: Ongoing  Goal: Free from intentional harm  Description: Free from intentional harm  Outcome: Ongoing     Problem: Daily Care:  Goal: Daily care needs are met  Description: Daily care needs are met  Outcome: Ongoing     Problem: Discharge Planning:  Goal: Patients continuum of care needs are met  Description: Patients continuum of care needs are met  Outcome: Ongoing     Problem: Pain:  Goal: Pain level will decrease  Description: Pain level will decrease  Outcome: Ongoing  Goal: Control of acute pain  Description: Control of acute pain  Outcome: Ongoing  Goal: Control of chronic pain  Description: Control of chronic pain  Outcome: Ongoing

## 2021-01-09 NOTE — OP NOTE
[address] [place] [address], [address] [place] [address]                                OPERATIVE REPORT    PATIENT NAME: Cecilia Laird                      :        1980  MED REC NO:   [id number]                          ROOM:       4115  ACCOUNT NO:   [de-identified]                           ADMIT DATE: 2021  PROVIDER:     Reba Davies DPM    DATE OF PROCEDURE:  2021    PREOPERATIVE DIAGNOSES:  1.  Osteomyelitis of the right foot. 2.  Diabetic ulceration with necrosis to bone, right foot. 3.  Diabetic with neuropathy. POSTOPERATIVE DIAGNOSES:  1.  Osteomyelitis of the right foot. 2.  Diabetic ulceration with necrosis to bone, right foot. 3.  Diabetic with neuropathy. ATTENDING PHYSICIAN:  Reba Davies DPM    PROCEDURE:  Transmetatarsal amputation on the right foot and  percutaneous tendo Achilles lengthening procedure right. ANESTHESIA:  General.    HEMOSTASIS:  Pneumatic thigh tourniquet set at 325 mmHg. ESTIMATED BLOOD LOSS:  10 mL. MATERIALS:  None. INJECTABLES:  10 mL of Marcaine plain injected postoperatively in an  ankle block type technique. SPECIMENS SENT:  Third metatarsal sent for culture and sensitivity and I  sent three separate sequential cut, 1, 2 and 3 with 3 being the most  proximal on the third metatarsal for pathology. COMPLICATIONS:  None. CONDITION:  Stable. INDICATIONS FOR PROCEDURE:  The patient is a very pleasant 59-year-old  gentleman that has had an ongoing and longstanding ulceration to the  plantar aspect of the right foot. The patient has tried several  treatments including total contact casting and trips to the wound center  as well as myself and the patient subsequently failed and developed a  bone infection. We had him admitted and then I went over with him.    Based upon the extent of the infection, its possible spread to the second and fourth metatarsals and its definitive spread to the third  metatarsal that this would be a limb salvage type of procedure. I went  over this in detail. The patient at this point understands there is no  guarantee. The patient understands that he could lose his leg. The  patient understands he definitely has to be nonweightbearing. We are  going to try to do 6 weeks of IV antibiotics and also consider  hyperbaric oxygen therapy to see if we can save his foot. The patient  at this point understands he wishes to try this first and understands  that if it does not work then he would require a below-knee amputation. I went over that in detail. The patient also understands the increased  risk of COVID-19 before, during and after surgery increases the risk of  morbidity and mortality. DESCRIPTION OF PROCEDURE:  Under mild sedation, the patient was brought  to the OR and placed on the operating room table in the supine position. Following induction of general anesthesia, the right foot and leg were  prepped, scrubbed and draped in a normal aseptic technique. Attention  was then directed to the right foot. After inflation of the tourniquet,  we did our percutaneous PING and then closed those incisions with 4-0  nylon. We came down, put the foot down, and then had drawn out our  incision placement. We resected the distal portion of the right foot  and then resected _____ we disregarded this but did take a sample of the  third metatarsal for culture. I did under fluoroscopic guidance, we got  our metatarsal parabola. We did take our sequential cuts of the third  metatarsal and sent them to pathology for evaluation.   We then made sure  to excise all the tendons and the sesamoids and at this time we rasped  the margins of the metatarsal and then did 3 liters of normal sterile  saline with 80 mg of gent per 1000 x2 and then one 3 mm saline normal. We then closed the site with 2-0 pop offs, 3-0 Vicryl, 4-0 Vicryl and  staples. The patient was then dressed with Betadine-soaked Adaptic. We  then infiltrated the above-listed injectable. We then dressed it with  4x4s, two Kerlix, three Webril and a posterior splint at 90 degrees. The patient tolerated the procedure and anesthesia well and left the OR  with vital signs stable and vascular status intact to the right foot. The patient will be seen as an inpatient. We will work on trying to get  him wheelchair for the future, hyperbaric oxygen therapy, PICC line, and  IV antibiotics. I discussed this with Dr. Amauri Posadas in detail the other day. I will let him know how the procedure went and we shall go from there.         Leatha Apodaca DPM    D: 01/08/2021 14:38:15       T: 01/08/2021 14:49:01     [email or web address]  Job#: [id number]     Doc#: [id number]    CC:

## 2021-01-09 NOTE — PROGRESS NOTES
Patient requested help to the bathroom. His right foot is bandaged, so attempted to assist the patient to the bathroom. He leaned too far forward attempting to grab the doorway, and kept propelling forward, and this nurse was unable to stop the progress, so he was lowered to the floor. He bumped is surgical foot, and was in great pain from that, but the fall was witnessed, and he did not hit his head, but was instead lowered to his buttocks. Retrieved help to get patient up and get him back in the chair. Patient was made comfortable in the chair.

## 2021-01-09 NOTE — PROGRESS NOTES
Hospitalist Progress Note      Name:  Aayush Hong /Age/Sex: 1980  (36 y.o. male)   MRN & CSN:  [id number] & [id number] Admission Date/Time: 2021  3:24 PM   Location:  [address] [place] [address] PCP: Trey Carter MD         Hospital Day: 6    Assessment and Plan:   Aayush Hong is a 36 y.o.  male  who presents with Diabetic foot ulcer with osteomyelitis ([facility]    Diabetic foot: Right with osteomyelitis  · Afebrile, WBC normal.  · Status post right transmetatarsal toe amputation, right Achilles tendon lengthening repair 2021. · Blood culture no growth. · Surgical culture pending. · Currently on cefepime and vancomycin. · Podiatry on consult  · Infectious disease on consult    Type 2 DM: Last A1C 7.8. Sliding scale. Hypoglycemia protocol. Accucheck. Hold oral hypoglycemic agents for now    Hypertension: Blood pressure controlled. Continue medications. Lisinopril hydrochlorothiazide. Hyponatremia: Sodium 132. Will monitor. Diet DIET CARB CONTROL;   DVT Prophylaxis [x] Lovenox, []  Heparin, [] SCDs, [] Warfarin  [] NOAC     GI Prophylaxis [x] PPI,  [] H2 Blocker,  [] Carafate,  [] Diet/Tube Feeds   Code Status Full Code   MDM [] Low, [] Moderate,[x]  High     History of Present Illness:     Chief Complaint: Diabetic foot ulcer with osteomyelitis ([facility].)    Seen and examined today. Pain is well controlled. No nausea or vomiting. No fever or chills. Ten point ROS reviewed negative, unless as noted above    Objective: Intake/Output Summary (Last 24 hours) at 2021 1408  Last data filed at 2021 0440  Gross per 24 hour   Intake 760 ml   Output 20 ml   Net 740 ml      Vitals:   Vitals:    21 1129   BP:    Pulse:    Resp:    Temp:    SpO2: 96%     Physical Exam:   GEN Awake male, sitting upright in bed in no apparent distress. Appears given age. EYES Pupils are equally round. No scleral erythema, discharge, or conjunctivitis.   HENT Mucous membranes are moist. Oral pharynx without exudates, no evidence of thrush. NECK Supple, no apparent thyromegaly or masses. RESP Clear to auscultation, no wheezes, rales or rhonchi. Symmetric chest movement while on room air. CARDIO/VASC S1/S2 auscultated. Regular rate without appreciable murmurs, rubs, or gallops. No JVD or carotid bruits. Peripheral pulses equal bilaterally and palpable. No peripheral edema. GI Abdomen is soft without significant tenderness, masses, or guarding. Bowel sounds are normoactive. Rectal exam deferred. MSK right foot with dressing. SKIN Normal coloration, warm, dry. NEURO Cranial nerves appear grossly intact, normal speech, no lateralizing weakness. PSYCH Awake, alert, oriented x 4. Affect appropriate.     Medications:   Medications:    vancomycin  1,750 mg Intravenous Q12H    aspirin  81 mg Oral Daily    lisinopril-hydroCHLOROthiazide  1 tablet Oral Daily    pantoprazole  40 mg Oral QAM AC    insulin lispro  0-12 Units Subcutaneous TID WC    insulin lispro  0-6 Units Subcutaneous Nightly    sodium chloride flush  10 mL Intravenous 2 times per day    enoxaparin  40 mg Subcutaneous Daily    cefepime  2 g Intravenous Q8H      Infusions:    dextrose       PRN Meds:     HYDROcodone-acetaminophen, 1 tablet, Q6H PRN      glucose, 15 g, PRN      dextrose, 12.5 g, PRN      glucagon (rDNA), 1 mg, PRN      dextrose, 100 mL/hr, PRN      sodium chloride flush, 10 mL, PRN      promethazine, 12.5 mg, Q6H PRN    Or      ondansetron, 4 mg, Q6H PRN      polyethylene glycol, 17 g, Daily PRN      acetaminophen, 650 mg, Q6H PRN    Or      acetaminophen, 650 mg, Q6H PRN      traMADol, 50 mg, Q6H PRN      Recent Labs     01/07/21  0245 01/08/21 0527 01/09/21  0131   WBC 8.4 7.0 10.5   HGB 11.6* 13.0* 11.4*   HCT 35.0* 40.0* 35.1*   * 538* 461*      Recent Labs     01/07/21  0245 01/08/21  0527 01/09/21  0131   * 132* 132*   K 4.5 4.6 4.2   CL 96* 95* 98*   CO2 19* 28 24   BUN 9 11 11 CREATININE 0.7* 0.8* 0.8*       Recent Labs     01/08/21  2112 01/09/21  0131 01/09/21  0813   TROPONINT 0.012* <0.010 <0.010      Imaging reviewed    Electronically signed by Trista Levine MD on 1/9/2021 at 2:08 PM

## 2021-01-09 NOTE — PROGRESS NOTES
[address]  consulted by Dr. Maren Ramsey for monitoring and adjustment. Indication for treatment: DFI w/ OM  Goal trough: 15 mcg/mL (-600)    Pertinent Laboratory Values:   Temp Readings from Last 3 Encounters:   01/09/21 98 °F (36.7 °C) (Oral)   01/08/21 98.6 °F (37 °C)   12/24/20 97.1 °F (36.2 °C)     Recent Labs     01/07/21  0245 01/08/21  0527 01/09/21  0131   WBC 8.4 7.0 10.5     Recent Labs     01/07/21  0245 01/08/21  0527 01/09/21  0131   BUN 9 11 11   CREATININE 0.7* 0.8* 0.8*     Estimated Creatinine Clearance: 129 mL/min (A) (based on SCr of 0.8 mg/dL (L)). Intake/Output Summary (Last 24 hours) at 1/9/2021 1636  Last data filed at 1/9/2021 0440  Gross per 24 hour   Intake 560 ml   Output --   Net 560 ml       Pertinent Cultures:  Date    Source    Results  1/8   Foot cx   NGTD  1/4   Blood    NGTD    Vancomycin level:   TROUGH:    Recent Labs     01/07/21  0245 01/09/21  0131   VANCOTROUGH 12.2 16.7     RANDOM:  No results for input(s): VANCORANDOM in the last 72 hours. Assessment:  · WBC and temperature: stable  · SCr, BUN, and urine output: stable   · Day(s) of therapy: 6   · Vancomycin concentration: 16.7-therapeutic    Plan:  · Continue vancomycin 1750 mg q12h with a therapeutic trough   · Pharmacy will continue to monitor patient and adjust therapy as indicated    Thank you for the consult.   Victor Manuel Noble RPh   1/9/2021 4:36 PM

## 2021-01-10 ENCOUNTER — APPOINTMENT (OUTPATIENT)
Dept: GENERAL RADIOLOGY | Age: 41
DRG: 305 | End: 2021-01-10
Payer: COMMERCIAL

## 2021-01-10 LAB
ANION GAP SERPL CALCULATED.3IONS-SCNC: 16 MMOL/L (ref 4–16)
BASOPHILS ABSOLUTE: 0.1 K/CU MM
BASOPHILS RELATIVE PERCENT: 0.5 % (ref 0–1)
BUN BLDV-MCNC: 8 MG/DL (ref 6–23)
CALCIUM SERPL-MCNC: 8.9 MG/DL (ref 8.3–10.6)
CHLORIDE BLD-SCNC: 95 MMOL/L (ref 99–110)
CO2: 19 MMOL/L (ref 21–32)
CREAT SERPL-MCNC: 0.9 MG/DL (ref 0.9–1.3)
CULTURE: NORMAL
DIFFERENTIAL TYPE: ABNORMAL
EOSINOPHILS ABSOLUTE: 0.2 K/CU MM
EOSINOPHILS RELATIVE PERCENT: 1.7 % (ref 0–3)
GFR AFRICAN AMERICAN: >60 ML/MIN/1.73M2
GFR NON-AFRICAN AMERICAN: >60 ML/MIN/1.73M2
GLUCOSE BLD-MCNC: 117 MG/DL (ref 70–99)
GLUCOSE BLD-MCNC: 121 MG/DL (ref 70–99)
GLUCOSE BLD-MCNC: 124 MG/DL (ref 70–99)
GLUCOSE BLD-MCNC: 148 MG/DL (ref 70–99)
HCT VFR BLD CALC: 35.3 % (ref 42–52)
HCT VFR BLD CALC: 36.2 % (ref 42–52)
HEMOGLOBIN: 11.5 GM/DL (ref 13.5–18)
HEMOGLOBIN: 12.1 GM/DL (ref 13.5–18)
IMMATURE NEUTROPHIL %: 0.3 % (ref 0–0.43)
LYMPHOCYTES ABSOLUTE: 3.2 K/CU MM
LYMPHOCYTES RELATIVE PERCENT: 34.9 % (ref 24–44)
Lab: NORMAL
MCH RBC QN AUTO: 30.4 PG (ref 27–31)
MCH RBC QN AUTO: 30.5 PG (ref 27–31)
MCHC RBC AUTO-ENTMCNC: 32.6 % (ref 32–36)
MCHC RBC AUTO-ENTMCNC: 33.4 % (ref 32–36)
MCV RBC AUTO: 91 FL (ref 78–100)
MCV RBC AUTO: 93.6 FL (ref 78–100)
MONOCYTES ABSOLUTE: 1 K/CU MM
MONOCYTES RELATIVE PERCENT: 10.6 % (ref 0–4)
NUCLEATED RBC %: 0 %
PDW BLD-RTO: 11.6 % (ref 11.7–14.9)
PDW BLD-RTO: 11.8 % (ref 11.7–14.9)
PLATELET # BLD: 477 K/CU MM (ref 140–440)
PLATELET # BLD: 500 K/CU MM (ref 140–440)
PMV BLD AUTO: 9 FL (ref 7.5–11.1)
PMV BLD AUTO: 9.4 FL (ref 7.5–11.1)
POTASSIUM SERPL-SCNC: 4.4 MMOL/L (ref 3.5–5.1)
RBC # BLD: 3.77 M/CU MM (ref 4.6–6.2)
RBC # BLD: 3.98 M/CU MM (ref 4.6–6.2)
SEGMENTED NEUTROPHILS ABSOLUTE COUNT: 4.8 K/CU MM
SEGMENTED NEUTROPHILS RELATIVE PERCENT: 52 % (ref 36–66)
SODIUM BLD-SCNC: 130 MMOL/L (ref 135–145)
SPECIMEN: NORMAL
TOTAL IMMATURE NEUTOROPHIL: 0.03 K/CU MM
TOTAL NUCLEATED RBC: 0 K/CU MM
TROPONIN T: <0.01 NG/ML
WBC # BLD: 8.8 K/CU MM (ref 4–10.5)
WBC # BLD: 9.2 K/CU MM (ref 4–10.5)

## 2021-01-10 PROCEDURE — 6360000002 HC RX W HCPCS: Performed by: PODIATRIST

## 2021-01-10 PROCEDURE — 84484 ASSAY OF TROPONIN QUANT: CPT

## 2021-01-10 PROCEDURE — 6360000002 HC RX W HCPCS: Performed by: HOSPITALIST

## 2021-01-10 PROCEDURE — 2580000003 HC RX 258: Performed by: PODIATRIST

## 2021-01-10 PROCEDURE — 85027 COMPLETE CBC AUTOMATED: CPT

## 2021-01-10 PROCEDURE — 93005 ELECTROCARDIOGRAM TRACING: CPT | Performed by: HOSPITALIST

## 2021-01-10 PROCEDURE — 71045 X-RAY EXAM CHEST 1 VIEW: CPT

## 2021-01-10 PROCEDURE — 85025 COMPLETE CBC W/AUTO DIFF WBC: CPT

## 2021-01-10 PROCEDURE — 6370000000 HC RX 637 (ALT 250 FOR IP): Performed by: PODIATRIST

## 2021-01-10 PROCEDURE — 82962 GLUCOSE BLOOD TEST: CPT

## 2021-01-10 PROCEDURE — 36415 COLL VENOUS BLD VENIPUNCTURE: CPT

## 2021-01-10 PROCEDURE — 6360000002 HC RX W HCPCS

## 2021-01-10 PROCEDURE — 6370000000 HC RX 637 (ALT 250 FOR IP): Performed by: INTERNAL MEDICINE

## 2021-01-10 PROCEDURE — 80048 BASIC METABOLIC PNL TOTAL CA: CPT

## 2021-01-10 PROCEDURE — 2140000000 HC CCU INTERMEDIATE R&B

## 2021-01-10 PROCEDURE — 94761 N-INVAS EAR/PLS OXIMETRY MLT: CPT

## 2021-01-10 PROCEDURE — 6370000000 HC RX 637 (ALT 250 FOR IP): Performed by: PHYSICIAN ASSISTANT

## 2021-01-10 RX ORDER — MORPHINE SULFATE 2 MG/ML
2 INJECTION, SOLUTION INTRAMUSCULAR; INTRAVENOUS ONCE
Status: COMPLETED | OUTPATIENT
Start: 2021-01-10 | End: 2021-01-10

## 2021-01-10 RX ORDER — MORPHINE SULFATE 2 MG/ML
INJECTION, SOLUTION INTRAMUSCULAR; INTRAVENOUS
Status: COMPLETED
Start: 2021-01-10 | End: 2021-01-10

## 2021-01-10 RX ORDER — LORAZEPAM 2 MG/ML
INJECTION INTRAMUSCULAR
Status: COMPLETED
Start: 2021-01-10 | End: 2021-01-10

## 2021-01-10 RX ORDER — LORAZEPAM 2 MG/ML
0.5 INJECTION INTRAMUSCULAR ONCE
Status: COMPLETED | OUTPATIENT
Start: 2021-01-10 | End: 2021-01-10

## 2021-01-10 RX ADMIN — HYDROCODONE BITARTRATE AND ACETAMINOPHEN 1 TABLET: 5; 325 TABLET ORAL at 10:49

## 2021-01-10 RX ADMIN — Medication: at 17:46

## 2021-01-10 RX ADMIN — INSULIN LISPRO 2 UNITS: 100 INJECTION, SOLUTION INTRAVENOUS; SUBCUTANEOUS at 13:01

## 2021-01-10 RX ADMIN — VANCOMYCIN HYDROCHLORIDE 1750 MG: 5 INJECTION, POWDER, LYOPHILIZED, FOR SOLUTION INTRAVENOUS at 00:24

## 2021-01-10 RX ADMIN — MORPHINE SULFATE: 2 INJECTION, SOLUTION INTRAMUSCULAR; INTRAVENOUS at 17:46

## 2021-01-10 RX ADMIN — CEFEPIME 2 G: 2 INJECTION, POWDER, FOR SOLUTION INTRAVENOUS at 16:54

## 2021-01-10 RX ADMIN — CEFEPIME 2 G: 2 INJECTION, POWDER, FOR SOLUTION INTRAVENOUS at 10:37

## 2021-01-10 RX ADMIN — LORAZEPAM 2 MG: 2 INJECTION, SOLUTION INTRAMUSCULAR; INTRAVENOUS at 17:46

## 2021-01-10 RX ADMIN — SODIUM CHLORIDE, PRESERVATIVE FREE 10 ML: 5 INJECTION INTRAVENOUS at 10:38

## 2021-01-10 RX ADMIN — PANTOPRAZOLE SODIUM 40 MG: 40 TABLET, DELAYED RELEASE ORAL at 05:27

## 2021-01-10 RX ADMIN — CEFEPIME 2 G: 2 INJECTION, POWDER, FOR SOLUTION INTRAVENOUS at 02:26

## 2021-01-10 RX ADMIN — VANCOMYCIN HYDROCHLORIDE 1750 MG: 5 INJECTION, POWDER, LYOPHILIZED, FOR SOLUTION INTRAVENOUS at 12:56

## 2021-01-10 RX ADMIN — ASPIRIN 81 MG CHEWABLE TABLET 81 MG: 81 TABLET CHEWABLE at 10:36

## 2021-01-10 RX ADMIN — HYDROCODONE BITARTRATE AND ACETAMINOPHEN 1 TABLET: 5; 325 TABLET ORAL at 16:55

## 2021-01-10 RX ADMIN — LORAZEPAM 2 MG: 2 INJECTION INTRAMUSCULAR at 17:46

## 2021-01-10 RX ADMIN — POLYETHYLENE GLYCOL (3350) 17 G: 17 POWDER, FOR SOLUTION ORAL at 05:27

## 2021-01-10 RX ADMIN — LORAZEPAM 0.5 MG: 2 INJECTION INTRAMUSCULAR; INTRAVENOUS at 17:56

## 2021-01-10 RX ADMIN — LISINOPRIL 20 MG: 20 TABLET ORAL at 10:36

## 2021-01-10 RX ADMIN — MORPHINE SULFATE 2 MG: 2 INJECTION, SOLUTION INTRAMUSCULAR; INTRAVENOUS at 17:56

## 2021-01-10 RX ADMIN — ENOXAPARIN SODIUM 40 MG: 40 INJECTION SUBCUTANEOUS at 10:36

## 2021-01-10 ASSESSMENT — PAIN SCALES - GENERAL
PAINLEVEL_OUTOF10: 10
PAINLEVEL_OUTOF10: 7
PAINLEVEL_OUTOF10: 7
PAINLEVEL_OUTOF10: 10
PAINLEVEL_OUTOF10: 5

## 2021-01-10 ASSESSMENT — PAIN DESCRIPTION - ORIENTATION: ORIENTATION: RIGHT

## 2021-01-10 ASSESSMENT — PAIN DESCRIPTION - DESCRIPTORS: DESCRIPTORS: ACHING;SHARP

## 2021-01-10 ASSESSMENT — PAIN DESCRIPTION - PAIN TYPE: TYPE: ACUTE PAIN

## 2021-01-10 ASSESSMENT — PAIN DESCRIPTION - PROGRESSION: CLINICAL_PROGRESSION: GRADUALLY WORSENING

## 2021-01-10 ASSESSMENT — PAIN DESCRIPTION - LOCATION
LOCATION: FOOT
LOCATION: CHEST

## 2021-01-10 ASSESSMENT — PAIN DESCRIPTION - ONSET: ONSET: ON-GOING

## 2021-01-10 NOTE — CONSULTS
[address], [address] [facility]                                  CONSULTATION    PATIENT NAME: Dottie Mitchell                      :        1980  MED REC NO:   [id number]                          ROOM:       4115  ACCOUNT NO:   [de-identified]                           ADMIT DATE: 2021  PROVIDER:     Bg Nolan DPM    CONSULT DATE:  01/10/2021    HISTORY OF PRESENT ILLNESS:  The patient is seen bedside today. The  patient denies any nausea, vomiting, fever or chills. He is just real  anxious overall about the procedure and he wants to get home as soon as  possible. I did have a long talk with him just to let him know that we  wanted no process to work its way out, so when he does go home that he  does it with less of likelihood of returning back to the hospital.  The  patient seems to have an understanding of that. The patient is  otherwise doing well. He said he did not take any pain medication today  and he is willing to have to get around on his walker. Currently vitals show temperature 98.2, respiration 18, pulse 82, blood  pressure 116/68. We are waiting on the final report for the pathology  and also the cultures. The patient's previous labs yesterday were 10.5. I will put an order in to do a new CBC today as well. PHYSICAL EXAMINATION:  The patient's physical exam does show that the  surgical site is well maintained. There is no dehiscence to the site. There is no extending redness or cellulitis associated to the region. The PING incision posteriorly is well maintained. There is no signs of  infection to that site either. There is no excessive swelling and there  is no dehiscence noted. Good appearance overall of the TMA and PING  surgical site. ASSESSMENT:  Status post transmetatarsal amputation on the right foot  and tendo Achilles lengthening on the right foot. TREATMENT:  The patient this time is educated about his condition. We  will wait on his final culture results so we can get ID on  board/Infectious Disease and consider 6 weeks of IV antibiotics. I do  want him to remain strict 100% nonweightbearing so getting physical  therapy in order and also making sure that he has a walker at home as  well as a wheelchair. The patient, I want him to get set up for  hyperbaric oxygen therapy upon his discharge. The patient is educated  about his condition. I want him to be strict 100% nonweightbearing. The patient is to rest and take it easy and we want to get our final  culture results, final pathology results and then institute our plan for  IV antibiotics, hyperbarics, offloading and really just taking the time  to get healed up. The patient does understand after had a long talk  with him that it is important for him to remain here briefly for a  couple more days or so to make sure that we had everything in order  before he would consider to go home because more people tend to bounce  back and have additional problems. If there is any questions or  concerns, please give me a call at [phone] and we will go from  there.         Anatoliy Billings DPM    D: 01/10/2021 10:52:55       T: 01/10/2021 10:57:06     [email or web address]  Job#: [id number]     Doc#: [id number]    CC:

## 2021-01-10 NOTE — PROGRESS NOTES
Hospitalist Progress Note      Name:  Dasia Elizabeth /Age/Sex: 1980  (36 y.o. male)   MRN & CSN:  [id number] & [id number] Admission Date/Time: 2021  3:24 PM   Location:  [address] [place] [address] PCP: Yonis Naylor MD         Hospital Day: 7    Assessment and Plan:   Dasia Elizabeth is a 36 y.o.  male  who presents with Diabetic foot ulcer with osteomyelitis ([facility].)    Diabetic foot: Right with osteomyelitis  · Afebrile, WBC normal.  · Status post right transmetatarsal toe amputation, right Achilles tendon lengthening repair 2021. · Blood culture no growth. · Surgical culture pending. · Currently on cefepime and vancomycin. · Podiatry on consult  · Infectious disease on consult    Type 2 DM: Last A1C 7.8. Sliding scale. Hypoglycemia protocol. Accucheck. Hold oral hypoglycemic agents for now    Hypertension: Blood pressure controlled. Continue medications. Lisinopril hydrochlorothiazide. Hyponatremia: Sodium 132. Will monitor. Diet DIET CARB CONTROL;   DVT Prophylaxis [x] Lovenox, []  Heparin, [] SCDs, [] Warfarin  [] NOAC     GI Prophylaxis [x] PPI,  [] H2 Blocker,  [] Carafate,  [] Diet/Tube Feeds   Code Status Full Code   MDM [] Low, [x] Moderate,[]  High     History of Present Illness:     Chief Complaint: Diabetic foot ulcer with osteomyelitis ([facility]    Seen and examined today. Pain well controlled. Ten point ROS reviewed negative, unless as noted above    Objective: Intake/Output Summary (Last 24 hours) at 1/10/2021 1335  Last data filed at 1/10/2021 0531  Gross per 24 hour   Intake 730 ml   Output --   Net 730 ml      Vitals:   Vitals:    01/10/21 1239   BP:    Pulse:    Resp:    Temp:    SpO2: 99%     Physical Exam:   GEN Awake male, sitting upright in bed in no apparent distress. Appears given age. EYES Pupils are equally round. No scleral erythema, discharge, or conjunctivitis.   HENT Mucous membranes are moist. Oral pharynx without exudates, no evidence of thrush. NECK Supple, no apparent thyromegaly or masses. RESP Clear to auscultation, no wheezes, rales or rhonchi. Symmetric chest movement while on room air. CARDIO/VASC S1/S2 auscultated. Regular rate without appreciable murmurs, rubs, or gallops. No JVD or carotid bruits. Peripheral pulses equal bilaterally and palpable. No peripheral edema. GI Abdomen is soft without significant tenderness, masses, or guarding. Bowel sounds are normoactive. Rectal exam deferred. MSK right foot with dressing. SKIN Normal coloration, warm, dry. NEURO Cranial nerves appear grossly intact, normal speech, no lateralizing weakness. PSYCH Awake, alert, oriented x 4. Affect appropriate.     Medications:   Medications:    lisinopril  20 mg Oral Daily    vancomycin  1,750 mg Intravenous Q12H    aspirin  81 mg Oral Daily    pantoprazole  40 mg Oral QAM AC    insulin lispro  0-12 Units Subcutaneous TID WC    insulin lispro  0-6 Units Subcutaneous Nightly    sodium chloride flush  10 mL Intravenous 2 times per day    enoxaparin  40 mg Subcutaneous Daily    cefepime  2 g Intravenous Q8H      Infusions:    dextrose       PRN Meds:     HYDROcodone-acetaminophen, 1 tablet, Q6H PRN      glucose, 15 g, PRN      dextrose, 12.5 g, PRN      glucagon (rDNA), 1 mg, PRN      dextrose, 100 mL/hr, PRN      sodium chloride flush, 10 mL, PRN      promethazine, 12.5 mg, Q6H PRN    Or      ondansetron, 4 mg, Q6H PRN      polyethylene glycol, 17 g, Daily PRN      acetaminophen, 650 mg, Q6H PRN    Or      acetaminophen, 650 mg, Q6H PRN      traMADol, 50 mg, Q6H PRN      Recent Labs     01/08/21  0527 01/09/21  0131 01/10/21  0730   WBC 7.0 10.5 8.8   HGB 13.0* 11.4* 11.5*   HCT 40.0* 35.1* 35.3*   * 461* 477*      Recent Labs     01/08/21  0527 01/09/21  0131   * 132*   K 4.6 4.2   CL 95* 98*   CO2 28 24   BUN 11 11   CREATININE 0.8* 0.8*       Recent Labs     01/10/21  0251 01/10/21  0806 01/10/21  0913   TROPONINT <0.010 <0.010 <0.010      Imaging reviewed    Electronically signed by Case Ro MD on 1/10/2021 at 1:35 PM

## 2021-01-10 NOTE — PROGRESS NOTES
[address] [facility]  consulted by Dr. Tomasa Dykes for monitoring and adjustment. Indication for treatment: DFI w/ OM  Goal trough: 15 mcg/mL (-600)    Pertinent Laboratory Values:   Temp Readings from Last 3 Encounters:   01/10/21 97.9 °F (36.6 °C) (Oral)   01/08/21 98.6 °F (37 °C)   12/24/20 97.1 °F (36.2 °C)     Recent Labs     01/08/21  0527 01/09/21  0131 01/10/21  0730   WBC 7.0 10.5 8.8     Recent Labs     01/08/21  0527 01/09/21  0131   BUN 11 11   CREATININE 0.8* 0.8*     Estimated Creatinine Clearance: 134 mL/min (A) (based on SCr of 0.8 mg/dL (L)). Intake/Output Summary (Last 24 hours) at 1/10/2021 1533  Last data filed at 1/10/2021 0531  Gross per 24 hour   Intake 730 ml   Output --   Net 730 ml       Pertinent Cultures:  Date    Source    Results  1/8   Foot cx   NGTD  1/4   Blood    NGTD    Vancomycin level:   TROUGH:    Recent Labs     01/09/21  0131   VANCOTROUGH 16.7     RANDOM:  No results for input(s): VANCORANDOM in the last 72 hours. Assessment:  · WBC and temperature: stable  · SCr, BUN, and urine output: stable   · Day(s) of therapy: 7  · Vancomycin concentration: 16.7-therapeutic    Plan:  · Continue vancomycin 1750 mg q12h with a therapeutic trough   · Pharmacy will continue to monitor patient and adjust therapy as indicated    Thank you for the consult.   Zulema Esquivel, [address]   1/10/2021 3:33 PM

## 2021-01-11 LAB
GLUCOSE BLD-MCNC: 137 MG/DL (ref 70–99)
GLUCOSE BLD-MCNC: 164 MG/DL (ref 70–99)
GLUCOSE BLD-MCNC: 167 MG/DL (ref 70–99)
GLUCOSE BLD-MCNC: 178 MG/DL (ref 70–99)
GLUCOSE BLD-MCNC: 197 MG/DL (ref 70–99)
LV EF: 58 %
LVEF MODALITY: NORMAL
TROPONIN T: <0.01 NG/ML
TROPONIN T: <0.01 NG/ML

## 2021-01-11 PROCEDURE — 6360000002 HC RX W HCPCS: Performed by: PODIATRIST

## 2021-01-11 PROCEDURE — 36415 COLL VENOUS BLD VENIPUNCTURE: CPT

## 2021-01-11 PROCEDURE — 2140000000 HC CCU INTERMEDIATE R&B

## 2021-01-11 PROCEDURE — 2580000003 HC RX 258: Performed by: PODIATRIST

## 2021-01-11 PROCEDURE — 99232 SBSQ HOSP IP/OBS MODERATE 35: CPT | Performed by: INTERNAL MEDICINE

## 2021-01-11 PROCEDURE — 97162 PT EVAL MOD COMPLEX 30 MIN: CPT

## 2021-01-11 PROCEDURE — 6370000000 HC RX 637 (ALT 250 FOR IP): Performed by: PHYSICIAN ASSISTANT

## 2021-01-11 PROCEDURE — 6370000000 HC RX 637 (ALT 250 FOR IP): Performed by: INTERNAL MEDICINE

## 2021-01-11 PROCEDURE — 84484 ASSAY OF TROPONIN QUANT: CPT

## 2021-01-11 PROCEDURE — 93306 TTE W/DOPPLER COMPLETE: CPT

## 2021-01-11 PROCEDURE — 99253 IP/OBS CNSLTJ NEW/EST LOW 45: CPT | Performed by: INTERNAL MEDICINE

## 2021-01-11 PROCEDURE — 6370000000 HC RX 637 (ALT 250 FOR IP): Performed by: PODIATRIST

## 2021-01-11 PROCEDURE — 97116 GAIT TRAINING THERAPY: CPT

## 2021-01-11 PROCEDURE — 82962 GLUCOSE BLOOD TEST: CPT

## 2021-01-11 RX ADMIN — CEFEPIME 2 G: 2 INJECTION, POWDER, FOR SOLUTION INTRAVENOUS at 09:17

## 2021-01-11 RX ADMIN — ENOXAPARIN SODIUM 40 MG: 40 INJECTION SUBCUTANEOUS at 09:16

## 2021-01-11 RX ADMIN — INSULIN LISPRO 2 UNITS: 100 INJECTION, SOLUTION INTRAVENOUS; SUBCUTANEOUS at 08:03

## 2021-01-11 RX ADMIN — SODIUM CHLORIDE, PRESERVATIVE FREE 10 ML: 5 INJECTION INTRAVENOUS at 01:50

## 2021-01-11 RX ADMIN — INSULIN LISPRO 2 UNITS: 100 INJECTION, SOLUTION INTRAVENOUS; SUBCUTANEOUS at 18:09

## 2021-01-11 RX ADMIN — LISINOPRIL 20 MG: 20 TABLET ORAL at 08:03

## 2021-01-11 RX ADMIN — CEFEPIME 2 G: 2 INJECTION, POWDER, FOR SOLUTION INTRAVENOUS at 01:50

## 2021-01-11 RX ADMIN — SODIUM CHLORIDE, PRESERVATIVE FREE 10 ML: 5 INJECTION INTRAVENOUS at 23:22

## 2021-01-11 RX ADMIN — TRAMADOL HYDROCHLORIDE 50 MG: 50 TABLET, FILM COATED ORAL at 04:46

## 2021-01-11 RX ADMIN — PANTOPRAZOLE SODIUM 40 MG: 40 TABLET, DELAYED RELEASE ORAL at 07:59

## 2021-01-11 RX ADMIN — VANCOMYCIN HYDROCHLORIDE 1750 MG: 5 INJECTION, POWDER, LYOPHILIZED, FOR SOLUTION INTRAVENOUS at 12:20

## 2021-01-11 RX ADMIN — HYDROCODONE BITARTRATE AND ACETAMINOPHEN 1 TABLET: 5; 325 TABLET ORAL at 00:19

## 2021-01-11 RX ADMIN — CEFEPIME 2 G: 2 INJECTION, POWDER, FOR SOLUTION INTRAVENOUS at 18:09

## 2021-01-11 RX ADMIN — SODIUM CHLORIDE, PRESERVATIVE FREE 10 ML: 5 INJECTION INTRAVENOUS at 09:16

## 2021-01-11 RX ADMIN — ASPIRIN 81 MG CHEWABLE TABLET 81 MG: 81 TABLET CHEWABLE at 08:03

## 2021-01-11 ASSESSMENT — PAIN SCALES - GENERAL
PAINLEVEL_OUTOF10: 3
PAINLEVEL_OUTOF10: 0
PAINLEVEL_OUTOF10: 0

## 2021-01-11 NOTE — CONSULTS
CARDIOLOGY CONSULT NOTE    Reason for consultation: Chest pain     Referring physician: Tutu Hadley MD      Primary care physician: Lyn Hatch MD        Dear Tutu Hadley MD   Thanks for the consult.     History of present Shawnee Smith is a 36 y. o.year old who  presents with diabetic foot ulcer and had rt toe amputation, [facility], his mother is also admitted to hospital, patient started to have chest pain last night and had twice, he thought it was panic attack, his chest pain was sharp  happening daily, intermittent for 15 to 20 mins and aggravated with activity substernal also,not reproducible with palpation, radiated to shoulder, 6/10, tender to touch,associated with shortness of breath, + sweating, nausea, did not get NTG in ED. No fever, no chills, no nausea no vomiting. Blood pressure, cholesterol, blood glucose and weight are well controlled.     Chief Complaint   Patient presents with    Wound Check       Past medical history:    has a past medical history of Callus of foot, Dizziness, Essential hypertension, and Lumbar radiculopathy. Past surgical history:   has a past surgical history that includes Cardiac catheterization (03/2018); [place] tooth extraction; Dental surgery; pr office/outpt visit,procedure only (Left, 4/17/2018); lumbar laminectomy (2018); and hernia repair (Bilateral, 5/27/2020). Social History:   reports that he has never smoked. He has never used smokeless tobacco. He reports current alcohol use. He reports that he does not use drugs.   Family history:   no family history of CAD, STROKE of DM        lisinopril (PRINIVIL;ZESTRIL) tablet 20 mg, Daily      HYDROcodone-acetaminophen (NORCO) 5-325 MG per tablet 1 tablet, Q6H PRN      vancomycin (VANCOCIN) 1,750 mg in dextrose 5 % 500 mL IVPB, Q12H      aspirin chewable tablet 81 mg, Daily      pantoprazole (PROTONIX) tablet 40 mg, QAM AC      insulin lispro (HUMALOG) injection anxiety or depression  · Endocrine: No malaise, fatigue or temperature intolerance  · Hematologic/Lymphatic: No bleeding problems, blood clots or swollen lymph nodes  · Allergic/Immunologic: No nasal congestion or hives  All systems negative except as marked.      ·    ·    ·      Physical Examination:    Vitals:    01/11/21 0912   BP: 107/76   Pulse: 107   Resp: 16   Temp: 98.6 °F (37 °C)   SpO2: 97%      Wt Readings from Last 3 Encounters:   01/10/21 170 lb (77.1 kg)   12/15/20 175 lb (79.4 kg)   09/23/20 181 lb (82.1 kg)     Body mass index is 23.06 kg/m².        General Appearance: No distress, conversant     Constitutional: Well developed, Well nourished, No acute distress, Non-toxic appearance.    HENT:  Normocephalic, Atraumatic, Bilateral external ears normal, Oropharynx moist, No oral exudates, Nose normal. Neck- Normal range of motion, No tenderness, Supple, No stridor,no apical-carotid delay, no carotid bruit  Eyes: PERRL, EOMI, Conjunctiva normal, No discharge.    Respiratory:  Normal breath sounds, No respiratory distress, No wheezing, No chest tenderness. ,no use of accessory muscles, diaphragm movement is normal  Cardiovascular: (PMI) apex non displaced,no lifts no thrills, no s3,no s4, Normal heart rate, Normal rhythm, No murmurs, No rubs, No gallops. Carotid arteries pulse and amplitude are normal no bruit, no abdominal bruit noted ( normal abdominal aorta ausculation), femoral arteries pulse and amplitude are normal no bruit, pedal pulses are normal.  GI: Bowel sounds normal, Soft, No tenderness, No masses, No pulsatile masses, no hepatosplenomegally, no bruits  : External genitalia appear normal, No masses or lesions. No discharge. No CVA tenderness.    Musculoskeletal: rt toe amputation, Intact distal pulses, No edema, No tenderness, No cyanosis, No clubbing. Good range of motion in all major joints. No tenderness to palpation or major deformities noted. Back- No tenderness.    Integument: Warm, Dry, No erythema, No rash. Skin: no rash, no ulcers  Lymphatic: No lymphadenopathy noted.    Neurologic: Alert & oriented x 3, Normal motor function, Normal sensory function, No focal deficits noted.    Psychiatric: Affect normal, Judgment normal, Mood normal.   Lab Review        Recent Labs       09/28/16 0010    WBC  12.3*    HGB  14.4    HCT  43.8    PLT  316            Recent Labs       09/28/16 0010    NA  136    K  3.9    CL  95*    CO2  23    BUN  10    CREATININE  0.8           Recent Labs       09/28/16 0010    AST  12*    ALT  10    BILITOT  0.4    ALKPHOS  124       No results for input(s): TROPONINI in the last 72 hours. No results found for: BNP  No results found for: INR, PROTIME        EKG:nsr     Chest Xray:nad     ECHO:pending  Labs, echo, meds reviewed  Assessment:      Recommendations:     1. Chest pain:  stress test and echo oredered, could be panic attack also, will recommend to treat anxiety as well  2. PAD: mild disease, he had diabetic foot ulcer s/p toe amputation  3. HTN: stable  4.  Health maintenance: exerise and diet  All labs, medications and tests reviewed, continue all other medications of all above medical condition listed as is.          Gregor Avila MD,   Gregor Avila MD, 1/11/2021 11:44 AM

## 2021-01-11 NOTE — PROGRESS NOTES
Ciarra Pemberton MD, [address]                Internal Medicine Hospitalist             Daily Progress  Note   Subjective:     Chief Complaint   Patient presents with    Wound Check     Mr. Margaret Martinez Complains of nil, events of yesterday noted. He wants to go home. Objective:    /77   Pulse 95   Temp 98.3 °F (36.8 °C) (Oral)   Resp 18   Ht 6' (1.829 m)   Wt 170 lb (77.1 kg)   SpO2 98%   BMI 23.06 kg/m²    No intake or output data in the 24 hours ending 01/11/21 0823   Physical Exam:  Heart:  Regular rate and rhythm, normal S1 and S2 in all 4 auscultatory areas. No rubs  Murmurs or gallops heard. Lungs: Mostly clear to auscultation, decreased breath sounds at bases. No wheezes appreciated no crackles heard. Abdomen: Soft, non distended. Bowel sounds appreciated. No obvious liver or spleen enlargement. Non tender, no rebound noted. Extremities: Right foot bandaged. .  CNS: Grossly intact.     Labs:  CBC with Differential:    Lab Results   Component Value Date    WBC 9.2 01/10/2021    RBC 3.98 01/10/2021    HGB 12.1 01/10/2021    HCT 36.2 01/10/2021     01/10/2021    MCV 91.0 01/10/2021    MCH 30.4 01/10/2021    MCHC 33.4 01/10/2021    RDW 11.6 01/10/2021    SEGSPCT 52.0 01/10/2021    LYMPHOPCT 34.9 01/10/2021    MONOPCT 10.6 01/10/2021    BASOPCT 0.5 01/10/2021    MONOSABS 1.0 01/10/2021    LYMPHSABS 3.2 01/10/2021    EOSABS 0.2 01/10/2021    BASOSABS 0.1 01/10/2021    DIFFTYPE AUTOMATED DIFFERENTIAL 01/10/2021     CMP:    Lab Results   Component Value Date     01/10/2021    K 4.4 01/10/2021    K 3.8 03/14/2018    CL 95 01/10/2021    CO2 19 01/10/2021    BUN 8 01/10/2021    CREATININE 0.9 01/10/2021    GFRAA >60 01/10/2021    AGRATIO 1.4 03/12/2020    LABGLOM >60 01/10/2021    GLUCOSE 121 01/10/2021    PROT 7.8 01/05/2021    LABALBU 3.4 01/05/2021    CALCIUM 8.9 01/10/2021    BILITOT 0.4 01/05/2021    ALKPHOS 50 01/05/2021    AST 15 01/05/2021    ALT 12 01/05/2021     Recent Labs 01/10/21  0913 01/10/21  2012 01/11/21  0303   TROPONINT <0.010 <0.010 <0.010     Lab Results   Component Value Date    TSHHS 2.640 02/22/2018         dextrose        lisinopril  20 mg Oral Daily    vancomycin  1,750 mg Intravenous Q12H    aspirin  81 mg Oral Daily    pantoprazole  40 mg Oral QAM AC    insulin lispro  0-12 Units Subcutaneous TID WC    insulin lispro  0-6 Units Subcutaneous Nightly    sodium chloride flush  10 mL Intravenous 2 times per day    enoxaparin  40 mg Subcutaneous Daily    cefepime  2 g Intravenous Q8H         Assessment:       Patient Active Problem List    Diagnosis Date Noted    Diabetic foot ulcer with osteomyelitis ([facility] 01/04/2021    WD-Cellulitis of foot right 12/22/2020    Diabetic ulcer of right midfoot associated with type 2 diabetes mellitus, with fat layer exposed ([facility] [address] 08/11/2020    Septic embolism ([facility] [address] 06/13/2020    Gastroesophageal reflux disease without esophagitis 05/11/2020    Type 2 diabetes mellitus with diabetic neuropathy, without long-term current use of insulin ([facility])     Essential hypertension     Lumbar radiculopathy     S/P lumbar laminectomy 04/18/2018    Lumbar back pain with radiculopathy affecting left lower extremity 04/17/2018    Unstable angina ([facility] [address] 03/13/2018       Plan:     Problems being addressed this admission:   Right foot ulcer / osteomyelitis / s/p TMT surgery 1/8/21 1/4/21-Diabetic foot ulcer with osteomyelitis - as per XR, no fever, hemodynamically stable. -known h/o right midfoot diabetic ulcer and f/u at wound care center -Podiatry surgeon, Dr. Blanche Still consulted in ED-empiric antibiotics: Cefepime + Vanc (pharm to dose)-elevated leg, NV checks-control  pain-wound care consulted-check lab works in AM-pending blood/wound cx, MRSA screening  1/5/21- to have u/s arterial and possible angio right leg as per vascular surgery.  On vanco and maxipime.   1/6/21- vascular surgery wrote ' Pt does not have any arterial disease by duplex and he has excellent doppler pulses on exam. Okay to proceed with amputation.' Meanwhile continue with the antibiotics. Surgery in 1-2 days per patient.   1/7/21- His Evangelista perioperative cardiac risk is low. Continue as before Possible surgery tomorrow. 1/11/21- ID saw patient on 1/9/21 and recommended continue vanco and cefepime, trend crp and esr, f/u bone cultures, anticipate 6 wk iv atbx. Podiatry recommends 100% non weight bearing, walker at home, wheelchair, hyperbaric oxygen upon dc. Surgical cx not back as yet. covid 19 neg on 1/8/21. Patient understands and will wait. Chest Pains  1/11/21- events of yesterday noted he does  Not have chest pains anymore. He ruled out for an AMI with serial enzymes. Abnormal EKG will consult cardiology as his HEART score is 3  ie low risk. Consult cardiology as this has happened twice.         HTN  1/4/21-Hypertension, uncontrolled - could be nonadherent to treatment vs r/t pain-control pain-cont lisinopril-hydrochlorothiazide-monitor BP trends   1/5/21- b/p is 128/82 today continue to monitor.   1/6/21- 121/80  1/7/21- 117/81   1/11/21- 130/77 today.      DM 2  1/4/21-DM type 2 - Last A1C 8.7-Sliding scale-monitor accucheck-diabetic diet-pending HgbA1C  1/5/21- sugar is 104 today. sodium is 133 will monitor.   1/6/21- sugar is 145 today continue to monitor. 1/7/21- sugar is 124.   1/11/21- sugar is 178 today.      Hyponatremia  1/7/21- I am not sure why it is low check osmolality and urine sodium. Repeat labs in am.   1/11/21- sodium is 130 today, had normal osmolality, fluctuating. [address] Ananda Luna was evaluated today and a DME order was entered for a standard wheelchair because he requires this to successfully complete daily living tasks of eating, bathing, toileting, personal cares, ambulating, grooming, hygiene, dressing upper body, dressing lower body, meal preparation and taking own medications.   A standard manual wheelchair is necessary due to patient's impaired ambulation and mobility restrictions and would be unable to resolve these daily living tasks using a cane or walker. The patient is capable of using a standard wheelchair safely in their home and can maneuver within their home with adequate access. There is a caregiver available to provide necessary assistance. The need for this equipment was discussed with the patient and he understands, is in agreement, and has not expressed an unwillingness to use the wheelchair.       Consultants:  Podiatry  Vascular surgery  Cardiology    General Orders:  Repeat basic labs again in am.  I have explained to the patient and discussed with him/her the treatment plan.   Doug Ruff MD, [address]

## 2021-01-11 NOTE — CARE COORDINATION
Pt plans home with [facility] at discharge. Pt may need IV ant-bio. Waiting on ID to make discharge recommendations. LSW received a consult for pt who is wanting a walker and WC at discharge. LSW spoke with Merry with [employer] and gave her referral for the walker and WC. Glenn Dominguez stated that the insurance will not pay for pt to get walker and WC at the same time. Merry stated since pt is   NWB then to get the [facility] first and once pt is able to BW they can get pt the walker. CM will need orders placed in the computer for a WC. The following must also be placed in a full progress note. .. Roxy Colón was evaluated today and a DME order was entered for a standard wheelchair because he requires this to successfully complete daily living tasks of eating, bathing, toileting, personal cares, ambulating, grooming, hygiene, dressing upper body, dressing lower body, meal preparation and taking own medications. A standard manual wheelchair is necessary due to patient's impaired ambulation and mobility restrictions and would be unable to resolve these daily living tasks using a cane or walker. The patient is capable of using a standard wheelchair safely in their home and can maneuver within their home with adequate access. There is a caregiver available to provide necessary assistance. The need for this equipment was discussed with the patient and he understands, is in agreement, and has not expressed an unwillingness to use the wheelchair. CM will need inpt HC order for nursing. The order has to have the IV anti-bio placed on the [facility]. order. CM will rosa isela a written Rx for the Iv anti-bio. LSW contacted the [facility] for Hyperbaric Oxygen. Pt has and appt at the wound clinic to initiate this services. Staff informed this LSW that they will need an order for the HBO. LSW PS Dr Tonya Turner and informed him of this info. LSW also informed him of the info needed for the DME.       Pt's appt at the [address] [place] [address] for HBO is Friday 1/15/2021 @ 8:30am.  This info was placed on the pt AVS.

## 2021-01-11 NOTE — FLOWSHEET NOTE
Patient was a little sad during the visit. Patient talked about losing part of his foot. Patient said that he is tired to be here and is also worrying about his mother. Family called him to tell him that his mom was brought to the ED. Patient did not express any needs. This  provided emotional support. Rev. Pradeep Diop, [address] [place] [address]. 01/11/21 8316   Encounter Summary   Services provided to: Patient   Referral/Consult From: [address] [place] [address]   Place of [facility] none   Continue Visiting Yes  (offered encouragements.  will appreciate follow up visits)   Complexity of Encounter Low   Length of Encounter 30 minutes   Spiritual Assessment Completed Yes   Routine   Type Post-procedure   Assessment Grieving   Intervention Active listening;Explored feelings, thoughts, concerns;Explored coping resources;Nurtured hope   Outcome Expressed gratitude   Spiritual/Taoism   Type Spiritual support

## 2021-01-11 NOTE — PROGRESS NOTES
This RN assisted the patient to the BR with walker. Full linen change provided. Repositioned patient back into bed. Removed breakfast tray with 100% eaten. Call light within reach. Bed in the lowest position.

## 2021-01-11 NOTE — PROGRESS NOTES
[address] [facility]  consulted by Dr. Esme De La Rosa for monitoring and adjustment. Indication for treatment: DFI w/ OM  Goal trough: 15 mcg/mL (-600)    Pertinent Laboratory Values:   Temp Readings from Last 3 Encounters:   01/11/21 98.6 °F (37 °C) (Oral)   01/08/21 98.6 °F (37 °C)   12/24/20 97.1 °F (36.2 °C)     Recent Labs     01/09/21  0131 01/10/21  0730 01/10/21  1758   WBC 10.5 8.8 9.2     Recent Labs     01/09/21  0131 01/10/21  1758   BUN 11 8   CREATININE 0.8* 0.9     Estimated Creatinine Clearance: 119 mL/min (based on SCr of 0.9 mg/dL). Intake/Output Summary (Last 24 hours) at 1/11/2021 1315  Last data filed at 1/11/2021 0955  Gross per 24 hour   Intake 112.02 ml   Output --   Net 112.02 ml       Pertinent Cultures:  Date    Source    Results  1/8   Foot cx   NGTD  1/4   Blood    NGTD    Vancomycin level:   TROUGH:    Recent Labs     01/09/21 0131   VANCOTROUGH 16.7     RANDOM:  No results for input(s): VANCORANDOM in the last 72 hours. Assessment:  · WBC and temperature: stable  · SCr, BUN, and urine output: stable   · Day(s) of therapy: 8  · Vancomycin concentration: 16.7-therapeutic    Plan:  · Continue vancomycin 1750 mg q12h with a therapeutic trough   · Repeat trough tomorrow   · Pharmacy will continue to monitor patient and adjust therapy as indicated    Thank you for the consult.   Tyler Paz RPh   1/11/2021 1:15 PM

## 2021-01-11 NOTE — CONSULTS
[address], 1980, [id number], 1/11/2021  History  [place]:  The primary encounter diagnosis was Diabetic foot infection ([facility] [address] A diagnosis of Osteomyelitis of right foot, unspecified type [facility]) was also pertinent to this visit. Patient  has a past medical history of Callus of foot, Dizziness, Essential hypertension, and Lumbar radiculopathy. Patient  has a past surgical history that includes Cardiac catheterization (03/2018); [place] tooth extraction; Dental surgery; pr office/outpt visit,procedure only (Left, 4/17/2018); lumbar laminectomy (2018); and hernia repair (Bilateral, 5/27/2020). Therapy Hx and additional comorbidities:  n/a    Restrictions:  NWB RLE  Communication with other providers:  cleared for tx  Subjective:  Patient states:  Agreeable to eval.  Interested in mobility. Interested in home. Feels he can and does follow RLE NWB. Pain:  3/10 RLE with dangling. Patient goal:  home  Occupational profile (relevant social history and personal factors):  lives with family/caregiver, typically independent mobility , independent with ADLs, uses single floor in home environment and level entry (ramp)      Examination of body systems (includes body structures/functions, activity/participation limitations): · Vision and Hearing:  WFL vision and WFL hearing  · Cognition and Participation:  alert, oriented, pleasant and participatory, engaged in service and follows commands appropriately. · Cardiac and Pulmonary Tolerance:  tolerant of activity  · Neuro:  grossly WFL for functional mobility   · Musculoskeletal:    Functional AROM:  within functional limits   PROM:  within functional limits. Functional strength:  within functional limits   · Mobility/Transfer Skills:  Pattern/Sequence:  WFL. Efficiency:  WFL. · Mobility skills performance:    Bed Mobility: independent. Sup-sit:  independent. Sit-stand:  independent. Stand-pivot:  modified independent. Bedside stepping/ambulation:  stand-by assist.    Ambulation:  With RW, used LLE hop-to pattern, maintained RLE NWB, initially advanced too far into RW MELANIE, corrected well with training. · Balance:    Sit-static: independent. Stand-static:  modified independent. Stand-dynamic:  stand-by assist.  Treatment today:    Gait Training:  Cues were given for safety, sequence, device management, balance, posture, awareness, path. Trained with RW. Assessment:  Conditions Requiring Skilled Therapeutic Intervention  Assessment: pleasant male with evolving features, now s/p partial foot amputation d/t osteomyelitis. with new mobility impairments related to NWB status. performed quite well at [facility] today. no needs for ongoing acute PT service. should be frequently mobile with nursing supervision. can and does follow RLE NWB guidelines. Treatment Diagnosis: new amb impairment  Prognosis: Good, Excellent  Decision Making: Medium Complexity  REQUIRES PT FOLLOW UP: No  Patient performed well today and does not require ongoing care physical therapy service. Discharge recommendations:  home independently. .      Equipment recommendations:  front-wheeled rolling walker  Plan:  Discharge from acute care physical therapy service. Recommendations for nursing mobility:  modified independent  Mobility homework:  change position frequently stand at bedside 4-6 times daily  accompany to bathroom for voiding ambulate 4-6 times daily, with least restrictive device  Time in:  1348  Time out:  1408  Timed treatment minutes:  10  Total treatment time:  20  Electronically signed by:   Silverio Mckeon, PT  1/11/2021, 2:36 PM

## 2021-01-11 NOTE — ANESTHESIA POSTPROCEDURE EVALUATION
Department of Anesthesiology  Postprocedure Note    Patient: Miriam Crocker  MRN: [id number]  YOB: 1980  Date of evaluation: 1/11/2021  Time:  7:14 AM     Procedure Summary     Date: 01/08/21 Room / Location: [address]    Anesthesia Start: 1207 Anesthesia Stop: 1441    Procedures:       RIGHT TRANSMETATARSAL TOE AMPUTATION (Right Toes)      RIGHT ACHILLES TENDON LENGTHENING REPAIR (Right Leg Lower) Diagnosis: (OSTEOMYELITIS RIGHT FOOT, ABSCESS RIGHT FOOT, DIABETIC WITH NEUROPATHY)    Surgeons: Nimo Saavedra DPM Responsible Provider: Emory Felty, DO    Anesthesia Type: general ASA Status: 3          Anesthesia Type: general    Karla Phase I: Karla Score: 10    Karla Phase II:      Last vitals: Reviewed and per EMR flowsheets.        Anesthesia Post Evaluation    Patient location during evaluation: PACU  Patient participation: complete - patient participated  Level of consciousness: sleepy but conscious  Airway patency: patent  Nausea & Vomiting: no nausea  Complications: no  Cardiovascular status: hemodynamically stable  Respiratory status: acceptable  Hydration status: euvolemic

## 2021-01-11 NOTE — PROGRESS NOTES
Infectious Disease Progress Note  2021   Patient Name: Rosa Cervantes : 1980       Reason for visit: F/u right foot diabetic osteomyelitis, s/p TMA on 2021? History:? Interval history noted  Denies n/v/d/f or untoward effects of antimicrobials  Complains of chest pain, and is upset that his 77year old mother has been admitted to the hospital for shortness of breath. Physical Exam:  Vital Signs: /76   Pulse 107   Temp 98.6 °F (37 °C) (Oral)   Resp 16   Ht 6' (1.829 m)   Wt 170 lb (77.1 kg)   SpO2 97%   BMI 23.06 kg/m²     Gen: alert and oriented X3,   Skin: no stigmata of endocarditis  Wounds: right foot dressing C/D/I  HEMT: AT/NC Oropharynx pink, moist, and without lesions or exudates; dentition in good state of repair  Eyes: PERRLA, EOMI, conjunctiva pink, sclera anicteric. Neck: Supple. Trachea midline. No LAD. Chest: no distress and CTA. Good air movement. Heart: RRR and no MRG. Abd: soft, non-distended, no tenderness, no hepatomegaly. Normoactive bowel sounds. Ext: no clubbing, cyanosis, or edema  Catheter Site: without erythema or tenderness  LDA:   Neuro: Mental status intact. CN 2-12 intact and no focal sensory or motor deficits     Radiologic / Imaging / TESTING  ONE XRAY VIEW OF THE CHEST       1/10/2021 5:50 pm       COMPARISON:   Chest 2021       HISTORY:   ORDERING SYSTEM PROVIDED HISTORY: chest pain       FINDINGS:   The cardiomediastinal and hilar silhouettes appear unremarkable. The lungs   appear clear. No pleural effusion evident. No pneumothorax is seen. No acute   osseous abnormality is identified. Impression   No radiographic evidence of acute cardiopulmonary disease.           Labs:    Recent Results (from the past 24 hour(s))   POCT Glucose    Collection Time: 01/10/21 12:10 PM   Result Value Ref Range    POC Glucose 148 (H) 70 - 99 MG/DL   POCT Glucose    Collection Time: 01/10/21  5:07 PM   Result Value Ref Range    POC Glucose 117 (H) 70 - 99 MG/DL   EKG 12 Lead    Collection Time: 01/10/21  5:41 PM   Result Value Ref Range    Ventricular Rate 130 BPM    Atrial Rate 130 BPM    P-R Interval 128 ms    QRS Duration 90 ms    Q-T Interval 320 ms    QTc Calculation (Bazett) 470 ms    P Axis 71 degrees    R Axis 38 degrees    T Axis 29 degrees    Diagnosis       Sinus tachycardia  ST & T wave abnormality, consider inferior ischemia  Abnormal ECG  When compared with ECG of 04-JAN-2021 23:35,  No significant change was found     CBC auto differential    Collection Time: 01/10/21  5:58 PM   Result Value Ref Range    WBC 9.2 4.0 - 10.5 K/CU MM    RBC 3.98 (L) 4.6 - 6.2 M/CU MM    Hemoglobin 12.1 (L) 13.5 - 18.0 GM/DL    Hematocrit 36.2 (L) 42 - 52 %    MCV 91.0 78 - 100 FL    MCH 30.4 27 - 31 PG    MCHC 33.4 32.0 - 36.0 %    RDW 11.6 (L) 11.7 - 14.9 %    Platelets 667 (H) 933 - 440 K/CU MM    MPV 9.0 7.5 - 11.1 FL    Differential Type AUTOMATED DIFFERENTIAL     Segs Relative 52.0 36 - 66 %    Lymphocytes % 34.9 24 - 44 %    Monocytes % 10.6 (H) 0 - 4 %    Eosinophils % 1.7 0 - 3 %    Basophils % 0.5 0 - 1 %    Segs Absolute 4.8 K/CU MM    Lymphocytes Absolute 3.2 K/CU MM    Monocytes Absolute 1.0 K/CU MM    Eosinophils Absolute 0.2 K/CU MM    Basophils Absolute 0.1 K/CU MM    Nucleated RBC % 0.0 %    Total Nucleated RBC 0.0 K/CU MM    Total Immature Neutrophil 0.03 K/CU MM    Immature Neutrophil % 0.3 0 - 0.43 %   Basic metabolic panel    Collection Time: 01/10/21  5:58 PM   Result Value Ref Range    Sodium 130 (L) 135 - 145 MMOL/L    Potassium 4.4 3.5 - 5.1 MMOL/L    Chloride 95 (L) 99 - 110 mMol/L    CO2 19 (L) 21 - 32 MMOL/L    Anion Gap 16 4 - 16    BUN 8 6 - 23 MG/DL    CREATININE 0.9 0.9 - 1.3 MG/DL    Glucose 121 (H) 70 - 99 MG/DL    Calcium 8.9 8.3 - 10.6 MG/DL    GFR Non-African American >60 >60 mL/min/1.73m2    GFR African American >60 >60 mL/min/1.73m2   Troponin    Collection Time: 01/10/21  8:12 PM   Result Value Ref Range    Troponin T <0.010 <0.01 NG/ML   POCT Glucose    Collection Time: 01/11/21 12:24 AM   Result Value Ref Range    POC Glucose 167 (H) 70 - 99 MG/DL   Troponin    Collection Time: 01/11/21  3:03 AM   Result Value Ref Range    Troponin T <0.010 <0.01 NG/ML   POCT Glucose    Collection Time: 01/11/21  7:43 AM   Result Value Ref Range    POC Glucose 178 (H) 70 - 99 MG/DL     CULTURE results: Invalid input(s): BLOOD CULTURE,  URINE CULTURE, SURGICAL CULTURE    Diagnosis:  Patient Active Problem List   Diagnosis    Unstable angina (HCC)    Lumbar back pain with radiculopathy affecting left lower extremity    S/P lumbar laminectomy    Type 2 diabetes mellitus with diabetic neuropathy, without long-term current use of insulin (HCC)    Essential hypertension    Lumbar radiculopathy    Gastroesophageal reflux disease without esophagitis    Septic embolism ([facility])    Diabetic ulcer of right midfoot associated with type 2 diabetes mellitus, with fat layer exposed ([address]    WD-Cellulitis of foot right    Diabetic foot ulcer with osteomyelitis ([address]       Active Problems  Principal Problem:    Diabetic foot ulcer with osteomyelitis ([address]  Resolved Problems:    * No resolved hospital problems. *      Impression and plan   Summary and rationale: Patient is a 36 y.o.  male with diabetes associated with peripheral neuropathy, lumbar radiculopathy, admitted with a 6 month history of right plantar ulcer that progressed to a chronic wound, MRI showed septic arthritis of the right third metatarsophalangeal joint with joint effusions compatible with abscess of the right third toe. There is also edema of the second metatarsal head and edema of the fourth metatarsal head with preserved T1 signal which may reflect noninfectious cystitis. Underwent a right foot TMA on 1/8/21   Clinical status: Improving   Therapeutic: vancomycin and cefepime    Diagnostic: trend pct, crp   F/u: bone culture   Other: needs PICC line.          Electronically signed by: Electronically signed by Christiano Yoon MD on 1/11/2021 at 10:44 AM

## 2021-01-12 ENCOUNTER — APPOINTMENT (OUTPATIENT)
Dept: NUCLEAR MEDICINE | Age: 41
DRG: 305 | End: 2021-01-12
Payer: COMMERCIAL

## 2021-01-12 LAB
DOSE AMOUNT: NORMAL
DOSE TIME: NORMAL
EKG ATRIAL RATE: 130 BPM
EKG DIAGNOSIS: NORMAL
EKG P AXIS: 71 DEGREES
EKG P-R INTERVAL: 128 MS
EKG Q-T INTERVAL: 320 MS
EKG QRS DURATION: 90 MS
EKG QTC CALCULATION (BAZETT): 470 MS
EKG R AXIS: 38 DEGREES
EKG T AXIS: 29 DEGREES
EKG VENTRICULAR RATE: 130 BPM
ERYTHROCYTE SEDIMENTATION RATE: 116 MM/HR (ref 0–15)
GLUCOSE BLD-MCNC: 139 MG/DL (ref 70–99)
GLUCOSE BLD-MCNC: 162 MG/DL (ref 70–99)
GLUCOSE BLD-MCNC: 214 MG/DL (ref 70–99)
GLUCOSE BLD-MCNC: 244 MG/DL (ref 70–99)
HCT VFR BLD CALC: 35.7 % (ref 42–52)
HEMOGLOBIN: 11.5 GM/DL (ref 13.5–18)
HIGH SENSITIVE C-REACTIVE PROTEIN: 19.8 MG/L
LV EF: 57 %
LVEF MODALITY: NORMAL
MCH RBC QN AUTO: 30 PG (ref 27–31)
MCHC RBC AUTO-ENTMCNC: 32.2 % (ref 32–36)
MCV RBC AUTO: 93.2 FL (ref 78–100)
PDW BLD-RTO: 12.1 % (ref 11.7–14.9)
PLATELET # BLD: 474 K/CU MM (ref 140–440)
PMV BLD AUTO: 9 FL (ref 7.5–11.1)
RBC # BLD: 3.83 M/CU MM (ref 4.6–6.2)
VANCOMYCIN TROUGH: 14.6 UG/ML (ref 10–20)
WBC # BLD: 6 K/CU MM (ref 4–10.5)

## 2021-01-12 PROCEDURE — 36569 INSJ PICC 5 YR+ W/O IMAGING: CPT

## 2021-01-12 PROCEDURE — 85027 COMPLETE CBC AUTOMATED: CPT

## 2021-01-12 PROCEDURE — 6360000002 HC RX W HCPCS: Performed by: PODIATRIST

## 2021-01-12 PROCEDURE — 99232 SBSQ HOSP IP/OBS MODERATE 35: CPT | Performed by: INTERNAL MEDICINE

## 2021-01-12 PROCEDURE — 85652 RBC SED RATE AUTOMATED: CPT

## 2021-01-12 PROCEDURE — 6360000002 HC RX W HCPCS: Performed by: INTERNAL MEDICINE

## 2021-01-12 PROCEDURE — 36415 COLL VENOUS BLD VENIPUNCTURE: CPT

## 2021-01-12 PROCEDURE — 80202 ASSAY OF VANCOMYCIN: CPT

## 2021-01-12 PROCEDURE — 2580000003 HC RX 258: Performed by: INTERNAL MEDICINE

## 2021-01-12 PROCEDURE — 6370000000 HC RX 637 (ALT 250 FOR IP): Performed by: PHYSICIAN ASSISTANT

## 2021-01-12 PROCEDURE — 2140000000 HC CCU INTERMEDIATE R&B

## 2021-01-12 PROCEDURE — 2580000003 HC RX 258: Performed by: PODIATRIST

## 2021-01-12 PROCEDURE — 93017 CV STRESS TEST TRACING ONLY: CPT

## 2021-01-12 PROCEDURE — 02HV33Z INSERTION OF INFUSION DEVICE INTO SUPERIOR VENA CAVA, PERCUTANEOUS APPROACH: ICD-10-PCS | Performed by: INTERNAL MEDICINE

## 2021-01-12 PROCEDURE — A9500 TC99M SESTAMIBI: HCPCS | Performed by: INTERNAL MEDICINE

## 2021-01-12 PROCEDURE — 6370000000 HC RX 637 (ALT 250 FOR IP): Performed by: PODIATRIST

## 2021-01-12 PROCEDURE — 2500000003 HC RX 250 WO HCPCS: Performed by: INTERNAL MEDICINE

## 2021-01-12 PROCEDURE — 3430000000 HC RX DIAGNOSTIC RADIOPHARMACEUTICAL: Performed by: INTERNAL MEDICINE

## 2021-01-12 PROCEDURE — 82962 GLUCOSE BLOOD TEST: CPT

## 2021-01-12 PROCEDURE — 86141 C-REACTIVE PROTEIN HS: CPT

## 2021-01-12 PROCEDURE — C1751 CATH, INF, PER/CENT/MIDLINE: HCPCS

## 2021-01-12 PROCEDURE — 93010 ELECTROCARDIOGRAM REPORT: CPT | Performed by: INTERNAL MEDICINE

## 2021-01-12 PROCEDURE — 76937 US GUIDE VASCULAR ACCESS: CPT

## 2021-01-12 PROCEDURE — 78452 HT MUSCLE IMAGE SPECT MULT: CPT

## 2021-01-12 RX ORDER — AMINOPHYLLINE DIHYDRATE 25 MG/ML
75 INJECTION, SOLUTION INTRAVENOUS ONCE
Status: COMPLETED | OUTPATIENT
Start: 2021-01-12 | End: 2021-01-12

## 2021-01-12 RX ORDER — SODIUM CHLORIDE 0.9 % (FLUSH) 0.9 %
10 SYRINGE (ML) INJECTION EVERY 12 HOURS SCHEDULED
Status: DISCONTINUED | OUTPATIENT
Start: 2021-01-12 | End: 2021-01-13 | Stop reason: HOSPADM

## 2021-01-12 RX ORDER — SODIUM CHLORIDE 0.9 % (FLUSH) 0.9 %
10 SYRINGE (ML) INJECTION PRN
Status: DISCONTINUED | OUTPATIENT
Start: 2021-01-12 | End: 2021-01-13 | Stop reason: HOSPADM

## 2021-01-12 RX ORDER — LIDOCAINE HYDROCHLORIDE 10 MG/ML
5 INJECTION, SOLUTION EPIDURAL; INFILTRATION; INTRACAUDAL; PERINEURAL ONCE
Status: COMPLETED | OUTPATIENT
Start: 2021-01-12 | End: 2021-01-12

## 2021-01-12 RX ADMIN — VANCOMYCIN HYDROCHLORIDE 1750 MG: 5 INJECTION, POWDER, LYOPHILIZED, FOR SOLUTION INTRAVENOUS at 01:40

## 2021-01-12 RX ADMIN — HYDROCODONE BITARTRATE AND ACETAMINOPHEN 1 TABLET: 5; 325 TABLET ORAL at 03:44

## 2021-01-12 RX ADMIN — INSULIN LISPRO 4 UNITS: 100 INJECTION, SOLUTION INTRAVENOUS; SUBCUTANEOUS at 11:43

## 2021-01-12 RX ADMIN — REGADENOSON 0.4 MG: 0.08 INJECTION, SOLUTION INTRAVENOUS at 09:15

## 2021-01-12 RX ADMIN — LIDOCAINE HYDROCHLORIDE ANHYDROUS 5 ML: 10 INJECTION, SOLUTION INFILTRATION at 14:25

## 2021-01-12 RX ADMIN — SODIUM CHLORIDE, PRESERVATIVE FREE 10 ML: 5 INJECTION INTRAVENOUS at 08:27

## 2021-01-12 RX ADMIN — HYDROCODONE BITARTRATE AND ACETAMINOPHEN 1 TABLET: 5; 325 TABLET ORAL at 15:53

## 2021-01-12 RX ADMIN — CEFEPIME 2 G: 2 INJECTION, POWDER, FOR SOLUTION INTRAVENOUS at 10:32

## 2021-01-12 RX ADMIN — HYDROCODONE BITARTRATE AND ACETAMINOPHEN 1 TABLET: 5; 325 TABLET ORAL at 10:32

## 2021-01-12 RX ADMIN — POLYETHYLENE GLYCOL (3350) 17 G: 17 POWDER, FOR SOLUTION ORAL at 21:21

## 2021-01-12 RX ADMIN — INSULIN LISPRO 4 UNITS: 100 INJECTION, SOLUTION INTRAVENOUS; SUBCUTANEOUS at 15:56

## 2021-01-12 RX ADMIN — TRAMADOL HYDROCHLORIDE 50 MG: 50 TABLET, FILM COATED ORAL at 21:21

## 2021-01-12 RX ADMIN — CEFEPIME 2 G: 2 INJECTION, POWDER, FOR SOLUTION INTRAVENOUS at 01:40

## 2021-01-12 RX ADMIN — SODIUM CHLORIDE, PRESERVATIVE FREE 10 ML: 5 INJECTION INTRAVENOUS at 21:22

## 2021-01-12 RX ADMIN — Medication 30 MILLICURIE: at 10:42

## 2021-01-12 RX ADMIN — Medication 10 MILLICURIE: at 10:43

## 2021-01-12 RX ADMIN — VANCOMYCIN HYDROCHLORIDE 1750 MG: 5 INJECTION, POWDER, LYOPHILIZED, FOR SOLUTION INTRAVENOUS at 14:29

## 2021-01-12 RX ADMIN — AMINOPHYLLINE 75 MG: 25 INJECTION, SOLUTION INTRAVENOUS at 09:22

## 2021-01-12 ASSESSMENT — PAIN SCALES - GENERAL
PAINLEVEL_OUTOF10: 10
PAINLEVEL_OUTOF10: 8
PAINLEVEL_OUTOF10: 4

## 2021-01-12 ASSESSMENT — PAIN DESCRIPTION - LOCATION: LOCATION: FOOT

## 2021-01-12 ASSESSMENT — PAIN DESCRIPTION - PAIN TYPE: TYPE: ACUTE PAIN

## 2021-01-12 NOTE — CONSULTS
[address] [place] [address], [address] [facility]                                  CONSULTATION    PATIENT NAME: Blanca Arroyo                      :        1980  MED REC NO:   [id number]                          ROOM:       3122  ACCOUNT NO:   [de-identified]                           ADMIT DATE: 2021  PROVIDER:     Puja Jorge DPM    CONSULT DATE:  2021    HISTORY OF PRESENT ILLNESS:  The patient is seen bedside today. The  patient is doing well. The patient denies any nausea, vomiting, fever  or chills. The patient relates that he has been doing better with a  walker and he has a walker at home. They are working on getting him a  wheelchair as well. The patient's vital signs currently, T-max is 98.7, pulse 103,  respiration 26, blood pressure 130/93. The patient's current white  count is 6.0. Most recent sed rate was 116. PHYSICAL EXAMINATION:  PODIATRIC: Shows that the amputation site appears to be well healing. There is negative signs or symptoms of infection. There is no  dehiscence, no discoloration associated to the area. The temperature is  normal to the foot at this time per postop. The patient has no  significant swelling associated to the site. The posterior Achilles  tendon lengthening incision sites are well maintained as well. ASSESSMENT:  At this time is status post TMA and PING on the right foot  for osteomyelitis of the right foot, diabetic with ulceration to bone  necrosis and diabetic with neuropathy. TREATMENT:  The patient at this time is educated about his condition. We are still trying to get the final pathology report and we are in the  process of doing that. We are waiting for the culture to finalize and  get the ID consult to go ahead. Once we get this we can pick an  antibiotic and do 6 weeks of IV antibiotics.   The patient is to be strict 100% nonweightbearing. They are working on physical therapy and  occupational therapy to help him get around as well. The patient  relates that he has been doing better with that. I think at this point  he would be able to go home with the antibiotics, offloading. Keeping  the dressing dry, clean and intact. Set an appointment to see me either  Thursday of this week or Tuesday of the next depending on when he gets  discharged. Call if he has any problems, questions or concerns in the  meantime. My number is [phone] and we shall go from there.         Leatha Apodaca DPM    D: 01/12/2021 13:00:57       T: 01/12/2021 13:07:43     [email or web address]  Job#: [id number]     Doc#: [id number]    CC:

## 2021-01-12 NOTE — PROGRESS NOTES
[address] [facility]  consulted by Dr. Greta Grove for monitoring and adjustment. Indication for treatment: DFI w/ OM  Goal trough: 15 mcg/mL (-600)    Pertinent Laboratory Values:   Temp Readings from Last 3 Encounters:   01/12/21 97.8 °F (36.6 °C) (Oral)   01/08/21 98.6 °F (37 °C)   12/24/20 97.1 °F (36.2 °C)     Recent Labs     01/10/21  0730 01/10/21  1758 01/12/21  1200   WBC 8.8 9.2 6.0     Recent Labs     01/10/21  1758   BUN 8   CREATININE 0.9     Estimated Creatinine Clearance: 119 mL/min (based on SCr of 0.9 mg/dL). No intake or output data in the 24 hours ending 01/12/21 1417    Pertinent Cultures:  Date    Source    Results  1/8   Foot cx   NGTD  1/4   Blood    NGTD    Vancomycin level:   TROUGH:    Recent Labs     01/12/21  1200   VANCOTROUGH 14.6     RANDOM:  No results for input(s): VANCORANDOM in the last 72 hours. Assessment:  · WBC and temperature: stable  · SCr, BUN, and urine output: stable   · Day(s) of therapy: 9  · Vancomycin concentration: 14.6-therapeutic    Plan:  · Continue vancomycin 1750 mg q12h with a therapeutic trough   · Repeat trough in 5 days   · Pharmacy will continue to monitor patient and adjust therapy as indicated    Thank you for the consult.   Holly Urban, [address]   1/12/2021 2:17 PM

## 2021-01-12 NOTE — PROGRESS NOTES
Yuliana Mcneill MD, [address]                Internal Medicine Hospitalist             Daily Progress  Note   Subjective:     Chief Complaint   Patient presents with    Wound Check     Mr. Pablo Cantu Complains of nil, no more chest pains. Objective:    /71   Pulse 80   Temp 97.6 °F (36.4 °C) (Oral)   Resp 21   Ht 6' (1.829 m)   Wt 170 lb (77.1 kg)   SpO2 96%   BMI 23.06 kg/m²      Intake/Output Summary (Last 24 hours) at 1/12/2021 0910  Last data filed at 1/11/2021 0955  Gross per 24 hour   Intake 112.02 ml   Output --   Net 112.02 ml      Physical Exam:  Heart:  Regular rate and rhythm, normal S1 and S2 in all 4 auscultatory areas. No rubs  Murmurs or gallops heard. Lungs: Mostly clear to auscultation, decreased breath sounds at bases. No wheezes appreciated no crackles heard. Abdomen: Soft, non distended. Bowel sounds appreciated. No obvious liver or spleen enlargement. Non tender, no rebound noted. Extremities: right foot bandaged. CNS: Grossly intact.     Labs:  CBC with Differential:    Lab Results   Component Value Date    WBC 6.0 01/12/2021    RBC 3.83 01/12/2021    HGB 11.5 01/12/2021    HCT 35.7 01/12/2021     01/12/2021    MCV 93.2 01/12/2021    MCH 30.0 01/12/2021    MCHC 32.2 01/12/2021    RDW 12.1 01/12/2021    SEGSPCT 52.0 01/10/2021    LYMPHOPCT 34.9 01/10/2021    MONOPCT 10.6 01/10/2021    BASOPCT 0.5 01/10/2021    MONOSABS 1.0 01/10/2021    LYMPHSABS 3.2 01/10/2021    EOSABS 0.2 01/10/2021    BASOSABS 0.1 01/10/2021    DIFFTYPE AUTOMATED DIFFERENTIAL 01/10/2021     BMP:    Lab Results   Component Value Date     01/10/2021    K 4.4 01/10/2021    K 3.8 03/14/2018    CL 95 01/10/2021    CO2 19 01/10/2021    BUN 8 01/10/2021    LABALBU 3.4 01/05/2021    CREATININE 0.9 01/10/2021    CALCIUM 8.9 01/10/2021    GFRAA >60 01/10/2021    LABGLOM >60 01/10/2021    GLUCOSE 121 01/10/2021     Recent Labs     01/10/21  2012 01/11/21  0303 01/11/21  1105   TROPONINT <0.010 <0.010 <0.010 Lab Results   Component Value Date    [facility] 2.640 02/22/2018         dextrose        lisinopril  20 mg Oral Daily    vancomycin  1,750 mg Intravenous Q12H    aspirin  81 mg Oral Daily    pantoprazole  40 mg Oral QAM AC    insulin lispro  0-12 Units Subcutaneous TID WC    insulin lispro  0-6 Units Subcutaneous Nightly    sodium chloride flush  10 mL Intravenous 2 times per day    enoxaparin  40 mg Subcutaneous Daily    cefepime  2 g Intravenous Q8H         Assessment:       Patient Active Problem List    Diagnosis Date Noted    Diabetic foot ulcer with osteomyelitis ([facility] 01/04/2021    WD-Cellulitis of foot right 12/22/2020    Diabetic ulcer of right midfoot associated with type 2 diabetes mellitus, with fat layer exposed ([facility] 08/11/2020    Septic embolism ([facility] 06/13/2020    Gastroesophageal reflux disease without esophagitis 05/11/2020    Type 2 diabetes mellitus with diabetic neuropathy, without long-term current use of insulin ([facility])     Essential hypertension     Lumbar radiculopathy     S/P lumbar laminectomy 04/18/2018    Lumbar back pain with radiculopathy affecting left lower extremity 04/17/2018    Unstable angina ([facility] 03/13/2018       Plan:     Problems being addressed this admission:     Right foot ulcer / osteomyelitis / s/p TMT surgery 1/8/21 1/4/21-Diabetic foot ulcer with osteomyelitis - as per XR, no fever, hemodynamically stable. -known h/o right midfoot diabetic ulcer and f/u at wound care center -Podiatry surgeon, Dr. Bonny Griffin consulted in ED-empiric antibiotics: Cefepime + Vanc (pharm to dose)-elevated leg, NV checks-control  pain-wound care consulted-check lab works in AM-pending blood/wound cx, MRSA screening  1/5/21- to have u/s arterial and possible angio right leg as per vascular surgery.  On vanco and maxipime.   1/6/21- vascular surgery wrote ' Pt does not have any arterial disease by duplex and he has excellent doppler pulses on exam. Okay to proceed with amputation.' Meanwhile continue with the antibiotics. Surgery in 1-2 days per patient.   1/7/21- His Evangelista perioperative cardiac risk is low. Continue as before Possible surgery tomorrow.   1/11/21- ID saw patient on 1/9/21 and recommended continue vanco and cefepime, trend crp and esr, f/u bone cultures, anticipate 6 wk iv atbx. Podiatry recommends 100% non weight bearing, walker at home, wheelchair, hyperbaric oxygen upon dc. Surgical cx not back as yet. covid 19 neg on 1/8/21. Patient understands and will wait. 1/12/21- awaiting cx results to come back, then will know what atbx to send him home on.      Chest Pains  1/11/21- events of yesterday noted he does  Not have chest pains anymore. He ruled out for an AMI with serial enzymes. Abnormal EKG will consult cardiology as his HEART score is 3  ie low risk. Consult cardiology as this has happened twice. 1/12/21- had stress test done today.          HTN  1/4/21-Hypertension, uncontrolled - could be nonadherent to treatment vs r/t pain-control pain-cont lisinopril-hydrochlorothiazide-monitor BP trends   1/5/21- b/p is 128/82 today continue to monitor.   1/6/21- 121/80  1/7/21- 117/81   1/11/21- 130/77 today. 1/12/21- 104/71     DM 2  1/4/21-DM type 2 - Last A1C 8.7-Sliding scale-monitor accucheck-diabetic diet-pending HgbA1C  1/5/21- sugar is 104 today. sodium is 133 will monitor.   1/6/21- sugar is 145 today continue to monitor. 1/7/21- sugar is 124.   1/11/21- sugar is 178 today. 1/12/21- sugar is 214 on SSI.      Hyponatremia  1/7/21- I am not sure why it is low check osmolality and urine sodium.  Repeat labs in am.   1/11/21- sodium is 130 today, had normal osmolality, fluctuating.     112/21- repeat labs in am.      Consultants:  Podiatry  Vascular surgery  Cardiology      General Orders:  Repeat basic labs again in am.  I have explained to the patient and discussed with him/her the treatment plan.   Cathi Sandoval MD, Sharmila Wan

## 2021-01-12 NOTE — PROGRESS NOTES
Infectious Disease Progress Note  2021   Patient Name: Len Brooks : 1980       Reason for visit: F/u right foot diabetic osteomyelitis, s/p TMA on 2021? History:? Interval history noted  Denies n/v/d/f or untoward effects of antimicrobials  Feels better, attributes anxiety to cefepime  Physical Exam:  Vital Signs: /74   Pulse 103   Temp 97.8 °F (36.6 °C) (Oral)   Resp 9   Ht 6' (1.829 m)   Wt 170 lb (77.1 kg)   SpO2 96%   BMI 23.06 kg/m²     Gen: alert and oriented X3,   Skin: no stigmata of endocarditis  Wounds: right foot dressing C/D/I  HEMT: AT/NC Oropharynx pink, moist, and without lesions or exudates; dentition in good state of repair  Eyes: PERRLA, EOMI, conjunctiva pink, sclera anicteric. Neck: Supple. Trachea midline. No LAD. Chest: no distress and CTA. Good air movement. Heart: RRR and no MRG. Abd: soft, non-distended, no tenderness, no hepatomegaly. Normoactive bowel sounds. Ext: no clubbing, cyanosis, or edema  Catheter Site: without erythema or tenderness  LDA: left arm PICC line  Neuro: Mental status intact. CN 2-12 intact and no focal sensory or motor deficits     Radiologic / Imaging / TESTING  ONE XRAY VIEW OF THE CHEST       1/10/2021 5:50 pm       COMPARISON:   Chest 2021       HISTORY:   ORDERING SYSTEM PROVIDED HISTORY: chest pain       FINDINGS:   The cardiomediastinal and hilar silhouettes appear unremarkable. The lungs   appear clear. No pleural effusion evident. No pneumothorax is seen. No acute   osseous abnormality is identified. Impression   No radiographic evidence of acute cardiopulmonary disease.           Labs:    Recent Results (from the past 24 hour(s))   POCT Glucose    Collection Time: 21  4:11 PM   Result Value Ref Range    POC Glucose 197 (H) 70 - 99 MG/DL   POCT Glucose    Collection Time: 21 10:21 PM   Result Value Ref Range    POC Glucose 164 (H) 70 - 99 MG/DL   Sedimentation Rate    Collection Time: 01/12/21  5:48 AM   Result Value Ref Range    Sed Rate 116 (H) 0 - 15 MM/HR   C-Reactive Protein    Collection Time: 01/12/21  5:48 AM   Result Value Ref Range    CRP, High Sensitivity 19.8 mg/L   POCT Glucose    Collection Time: 01/12/21  6:46 AM   Result Value Ref Range    POC Glucose 162 (H) 70 - 99 MG/DL   POCT Glucose    Collection Time: 01/12/21 11:32 AM   Result Value Ref Range    POC Glucose 214 (H) 70 - 99 MG/DL   CBC    Collection Time: 01/12/21 12:00 PM   Result Value Ref Range    WBC 6.0 4.0 - 10.5 K/CU MM    RBC 3.83 (L) 4.6 - 6.2 M/CU MM    Hemoglobin 11.5 (L) 13.5 - 18.0 GM/DL    Hematocrit 35.7 (L) 42 - 52 %    MCV 93.2 78 - 100 FL    MCH 30.0 27 - 31 PG    MCHC 32.2 32.0 - 36.0 %    RDW 12.1 11.7 - 14.9 %    Platelets 693 (H) 286 - 440 K/CU MM    MPV 9.0 7.5 - 11.1 FL     CULTURE results: Invalid input(s): BLOOD CULTURE,  URINE CULTURE, SURGICAL CULTURE    Diagnosis:  Patient Active Problem List   Diagnosis    Unstable angina ([facility])    Lumbar back pain with radiculopathy affecting left lower extremity    S/P lumbar laminectomy    Type 2 diabetes mellitus with diabetic neuropathy, without long-term current use of insulin (HCC)    Essential hypertension    Lumbar radiculopathy    Gastroesophageal reflux disease without esophagitis    Septic embolism ([facility])    Diabetic ulcer of right midfoot associated with type 2 diabetes mellitus, with fat layer exposed ([address]    WD-Cellulitis of foot right    Diabetic foot ulcer with osteomyelitis ([address]       Active Problems  Principal Problem:    Diabetic foot ulcer with osteomyelitis ([address]  Resolved Problems:    * No resolved hospital problems. *      Impression and plan   Summary and rationale: Patient is a 36 y.o.   male with diabetes associated with peripheral neuropathy, lumbar radiculopathy, admitted with a 6 month history of right plantar ulcer that progressed to a chronic wound, MRI showed septic arthritis of the right third

## 2021-01-12 NOTE — PROGRESS NOTES
This patient's right arm is swollen from IV infiltration this morning at 0420 AM. Nurse elevated arm and applied Ice pack. Arm looks much better at this time. Physician notified. Will continue to monitor.

## 2021-01-12 NOTE — CONSULTS
Consult completed. Procedure/rationale explained to pt including benefits vs potential risks/complications; questions answered & consent obtained. #4Fr PowerPICC SOLO initiated to RUE Basilic Vein using sterile, UltraSound-guided technique without difficulty/complications. Positioning verified via TPS & 3CG with appropriate P-wave changes noted. Sterile dressing with SkinPrep, StatLock Securing Device, BioPatch, SwabCap, and Limb Precautions band applied. Pt tolerated well & denies other c/o or needs. RN notified. (0) independent

## 2021-01-12 NOTE — PROGRESS NOTES
Today's plan:  Stress test is normal,echo is normal, no further cardiac work up is recommended      Admit Date:  1/4/2021    Subjective:ok      Chief complaints on admission  Chief Complaint   Patient presents with    Wound Check         History of present illness:Julien is a 36 y. o.year old who  presents with had concerns including Wound Check. Past medical history:    has a past medical history of Callus of foot, Dizziness, Essential hypertension, and Lumbar radiculopathy. Past surgical history:   has a past surgical history that includes Cardiac catheterization (03/2018); Hines tooth extraction; Dental surgery; pr office/outpt visit,procedure only (Left, 4/17/2018); lumbar laminectomy (2018); and hernia repair (Bilateral, 5/27/2020). Social History:   reports that he has never smoked. He has never used smokeless tobacco. He reports current alcohol use. He reports that he does not use drugs. Family history:  family history includes Diabetes in his father and mother; Heart Disease in his father. No Known Allergies      Objective:   /74   Pulse 103   Temp 97.8 °F (36.6 °C) (Oral)   Resp 9   Ht 6' (1.829 m)   Wt 170 lb (77.1 kg)   SpO2 96%   BMI 23.06 kg/m²   No intake or output data in the 24 hours ending 01/12/21 1553      Physical Exam:  Constitutional:  Well developed, Well nourished, No acute distress, Non-toxic appearance. HENT:  Normocephalic, Atraumatic, Bilateral external ears normal, Oropharynx moist, No oral exudates, Nose normal. Neck- Normal range of motion, No tenderness, Supple, No stridor. Eyes:  PERRL, EOMI, Conjunctiva normal, No discharge. Respiratory:  Normal breath sounds, No respiratory distress, No wheezing, No chest tenderness. ,no use of accessory muscles, diaphragm movement is normal  Cardiovascular: (PMI) apex non displaced,no lifts no thrills, no s3,no s4, Normal heart rate, Normal rhythm, No murmurs, No rubs, No gallops.  Carotid arteries pulse and amplitude are normal no bruit, no abdominal bruit noted ( normal abdominal aorta ausculation), femoral arteries pulse and amplitude are normal no bruit, pedal pulses are normal  GI:  Bowel sounds normal, Soft, No tenderness, No masses, No pulsatile masses. : External genitalia appear normal, No masses or lesions. No discharge. No CVA tenderness. Musculoskeletal:  Intact distal pulses, No edema, No tenderness, No cyanosis, No clubbing. Good range of motion in all major joints. No tenderness to palpation or major deformities noted. Back- No tenderness. Integument:  Warm, Dry, No erythema, No rash. Lymphatic:  No lymphadenopathy noted. Neurologic:  Alert & oriented x 3, Normal motor function, Normal sensory function, No focal deficits noted. Psychiatric:  Affect normal, Judgment normal, Mood normal.     Medications:    sodium chloride flush  10 mL Intravenous 2 times per day    lisinopril  20 mg Oral Daily    vancomycin  1,750 mg Intravenous Q12H    aspirin  81 mg Oral Daily    pantoprazole  40 mg Oral QAM AC    insulin lispro  0-12 Units Subcutaneous TID WC    insulin lispro  0-6 Units Subcutaneous Nightly    sodium chloride flush  10 mL Intravenous 2 times per day    enoxaparin  40 mg Subcutaneous Daily      dextrose       sodium chloride flush, HYDROcodone-acetaminophen, glucose, dextrose, glucagon (rDNA), dextrose, sodium chloride flush, promethazine **OR** ondansetron, polyethylene glycol, acetaminophen **OR** acetaminophen, traMADol    Lab Data:  CBC:   Recent Labs     01/10/21  0730 01/10/21  1758 01/12/21  1200   WBC 8.8 9.2 6.0   HGB 11.5* 12.1* 11.5*   HCT 35.3* 36.2* 35.7*   MCV 93.6 91.0 93.2   * 500* 474*     BMP:   Recent Labs     01/10/21  1758   *   K 4.4   CL 95*   CO2 19*   BUN 8   CREATININE 0.9     LIVER PROFILE: No results for input(s): AST, ALT, LIPASE, BILIDIR, BILITOT, ALKPHOS in the last 72 hours. Invalid input(s):   AMYLASE,  ALB  PT/INR: No results for input(s): PROTIME, INR in the last 72 hours. APTT: No results for input(s): APTT in the last 72 hours. BNP:  No results for input(s): BNP in the last 72 hours. TROPONIN: @TROPONINI:3@      Assessment:  36 y. o.year old who is admitted for          Plan:  1. Chest pain:  stress test and echo oredered, could be panic attack also, will recommend to treat anxiety as well  2. PAD: mild disease, he had diabetic foot ulcer s/p toe amputation  3. HTN: stableHealth maintenance: exerise and diet  All labs, medications and tests reviewed, continue all other medications of all above medical condition listed as is.       Raúl Velazco MD 1/12/2021 3:53 PM

## 2021-01-13 VITALS
WEIGHT: 172 LBS | OXYGEN SATURATION: 96 % | HEIGHT: 72 IN | BODY MASS INDEX: 23.3 KG/M2 | TEMPERATURE: 98.3 F | SYSTOLIC BLOOD PRESSURE: 130 MMHG | HEART RATE: 97 BPM | RESPIRATION RATE: 20 BRPM | DIASTOLIC BLOOD PRESSURE: 93 MMHG

## 2021-01-13 LAB
ANION GAP SERPL CALCULATED.3IONS-SCNC: 9 MMOL/L (ref 4–16)
BUN BLDV-MCNC: 10 MG/DL (ref 6–23)
CALCIUM SERPL-MCNC: 8.6 MG/DL (ref 8.3–10.6)
CHLORIDE BLD-SCNC: 100 MMOL/L (ref 99–110)
CO2: 26 MMOL/L (ref 21–32)
CREAT SERPL-MCNC: 0.7 MG/DL (ref 0.9–1.3)
CULTURE: NORMAL
CULTURE: NORMAL
GFR AFRICAN AMERICAN: >60 ML/MIN/1.73M2
GFR NON-AFRICAN AMERICAN: >60 ML/MIN/1.73M2
GLUCOSE BLD-MCNC: 116 MG/DL (ref 70–99)
GLUCOSE BLD-MCNC: 161 MG/DL (ref 70–99)
GLUCOSE BLD-MCNC: 175 MG/DL (ref 70–99)
HIGH SENSITIVE C-REACTIVE PROTEIN: 11.5 MG/L
Lab: NORMAL
POTASSIUM SERPL-SCNC: 4.1 MMOL/L (ref 3.5–5.1)
SODIUM BLD-SCNC: 135 MMOL/L (ref 135–145)
SPECIMEN: NORMAL

## 2021-01-13 PROCEDURE — 6360000002 HC RX W HCPCS: Performed by: PODIATRIST

## 2021-01-13 PROCEDURE — 36415 COLL VENOUS BLD VENIPUNCTURE: CPT

## 2021-01-13 PROCEDURE — 86141 C-REACTIVE PROTEIN HS: CPT

## 2021-01-13 PROCEDURE — 94761 N-INVAS EAR/PLS OXIMETRY MLT: CPT

## 2021-01-13 PROCEDURE — 6370000000 HC RX 637 (ALT 250 FOR IP): Performed by: INTERNAL MEDICINE

## 2021-01-13 PROCEDURE — 2580000003 HC RX 258: Performed by: INTERNAL MEDICINE

## 2021-01-13 PROCEDURE — 2580000003 HC RX 258: Performed by: PODIATRIST

## 2021-01-13 PROCEDURE — 80048 BASIC METABOLIC PNL TOTAL CA: CPT

## 2021-01-13 PROCEDURE — 6370000000 HC RX 637 (ALT 250 FOR IP): Performed by: PODIATRIST

## 2021-01-13 PROCEDURE — 82962 GLUCOSE BLOOD TEST: CPT

## 2021-01-13 RX ORDER — HYDROCODONE BITARTRATE AND ACETAMINOPHEN 5; 325 MG/1; MG/1
1 TABLET ORAL EVERY 6 HOURS PRN
Qty: 12 TABLET | Refills: 0 | Status: SHIPPED | OUTPATIENT
Start: 2021-01-13 | End: 2021-01-16

## 2021-01-13 RX ORDER — HYDROCODONE BITARTRATE AND ACETAMINOPHEN 5; 325 MG/1; MG/1
1 TABLET ORAL EVERY 6 HOURS PRN
Qty: 12 TABLET | Refills: 0 | Status: SHIPPED | OUTPATIENT
Start: 2021-01-13 | End: 2021-01-13

## 2021-01-13 RX ADMIN — PANTOPRAZOLE SODIUM 40 MG: 40 TABLET, DELAYED RELEASE ORAL at 05:05

## 2021-01-13 RX ADMIN — ENOXAPARIN SODIUM 40 MG: 40 INJECTION SUBCUTANEOUS at 09:30

## 2021-01-13 RX ADMIN — LISINOPRIL 20 MG: 20 TABLET ORAL at 09:30

## 2021-01-13 RX ADMIN — SODIUM CHLORIDE, PRESERVATIVE FREE 10 ML: 5 INJECTION INTRAVENOUS at 09:31

## 2021-01-13 RX ADMIN — INSULIN LISPRO 2 UNITS: 100 INJECTION, SOLUTION INTRAVENOUS; SUBCUTANEOUS at 09:32

## 2021-01-13 RX ADMIN — SODIUM CHLORIDE, PRESERVATIVE FREE 10 ML: 5 INJECTION INTRAVENOUS at 09:38

## 2021-01-13 RX ADMIN — POLYETHYLENE GLYCOL (3350) 17 G: 17 POWDER, FOR SOLUTION ORAL at 12:29

## 2021-01-13 RX ADMIN — ASPIRIN 81 MG CHEWABLE TABLET 81 MG: 81 TABLET CHEWABLE at 09:30

## 2021-01-13 RX ADMIN — VANCOMYCIN HYDROCHLORIDE 1750 MG: 5 INJECTION, POWDER, LYOPHILIZED, FOR SOLUTION INTRAVENOUS at 13:02

## 2021-01-13 RX ADMIN — VANCOMYCIN HYDROCHLORIDE 1750 MG: 5 INJECTION, POWDER, LYOPHILIZED, FOR SOLUTION INTRAVENOUS at 01:18

## 2021-01-13 ASSESSMENT — PAIN SCALES - GENERAL
PAINLEVEL_OUTOF10: 0

## 2021-01-13 ASSESSMENT — PAIN DESCRIPTION - PROGRESSION: CLINICAL_PROGRESSION: GRADUALLY WORSENING

## 2021-01-13 NOTE — PROGRESS NOTES
[address] [facility]  consulted by Dr. Stella Sherman for monitoring and adjustment. Indication for treatment: DFI w/ OM  Goal trough: 15 mcg/mL (-600)    Pertinent Laboratory Values:   Temp Readings from Last 3 Encounters:   01/13/21 98.3 °F (36.8 °C) (Oral)   01/08/21 98.6 °F (37 °C)   12/24/20 97.1 °F (36.2 °C)     Recent Labs     01/10/21  1758 01/12/21  1200   WBC 9.2 6.0     Recent Labs     01/10/21  1758 01/13/21  0358   BUN 8 10   CREATININE 0.9 0.7*     Estimated Creatinine Clearance: 154 mL/min (A) (based on SCr of 0.7 mg/dL (L)). Intake/Output Summary (Last 24 hours) at 1/13/2021 1415  Last data filed at 1/12/2021 1800  Gross per 24 hour   Intake --   Output 550 ml   Net -550 ml       Pertinent Cultures:  Date    Source    Results  1/8   Foot cx   NGTD  1/4   Blood    NGTD    Vancomycin level:   TROUGH:    Recent Labs     01/12/21  1200   VANCOTROUGH 14.6     RANDOM:  No results for input(s): VANCORANDOM in the last 72 hours. Assessment:  · WBC and temperature: stable  · SCr, BUN, and urine output: stable   · Day(s) of therapy: 10  · Vancomycin concentration: 14.6-therapeutic    Plan:  · Continue vancomycin 1750 mg q12h with a therapeutic trough   · Repeat trough in 4 days   · Pharmacy will continue to monitor patient and adjust therapy as indicated    Thank you for the consult.   Li Freitas, GiovannaD, BCPS   1/13/2021 2:15 PM

## 2021-01-13 NOTE — DISCHARGE SUMMARY
Discharge Summary    Name:  Mike Jamil /Age/Sex: 1980  (36 y.o. male)   MRN & CSN:  [id number] & [id number] Admission Date/Time: 2021  3:24 PM   Attending:  Felicity Pedro MD Discharging Physician: Felicity Pedro MD     Hospital Course:   Mike Jamil is a 36 y.o.  male  who presents with Diabetic foot ulcer with osteomyelitis [facility])     Diabetic foot: Right with osteomyelitis  · Status post right transmetatarsal toe amputation, right Achilles tendon lengthening repair 2021. · Blood culture no growth. · Surgical culture gram-positive bacilli  · We will discharge on vancomycin for 6 weeks. · Podiatry on consult  · Infectious disease on consult     Type 2 DM: Last A1C 7.8. Discharged on oral hypoglycemic agents.     Hypertension: Blood pressure controlled. Continue medications. Lisinopril hydrochlorothiazide.     Hyponatremia: Resolved    [facility] evaluated today and a DME order was entered for a standard wheelchair because he requires this to successfully complete daily living tasks of eating, bathing, toileting, personal cares, ambulating, grooming, hygiene, dressing upper body, dressing lower body, meal preparation and taking own medications.  A standard manual wheelchair is necessary due to patient's impaired ambulation and mobility restrictions and would be unable to resolve these daily living tasks using a cane or walker.  The patient is capable of using a standard wheelchair safely in their home and can maneuver within their home with adequate access. Rajeev Guevara is a caregiver available to provide necessary assistance.  The need for this equipment was discussed with the patient and he understands, is in agreement, and has not expressed an unwillingness to use the wheelchair. The patient expressed appropriate understanding of and agreement with the discharge recommendations, medications, and plan.      Consults this admission:  PHARMACY TO DOSE VANCOMYCIN  IP CONSULT TO PODIATRY  IP CONSULT TO HOSPITALIST  IP CONSULT TO PODIATRY  PHARMACY TO DOSE VANCOMYCIN  IP CONSULT TO VASCULAR SURGERY  IP CONSULT TO HOSPITALIST  IP CONSULT TO INFECTIOUS DISEASES  IP CONSULT TO CARDIOLOGY  IP CONSULT TO CASE MANAGEMENT    Discharge Instruction:   Follow up appointments: Podiatry, infectious disease, vascular surgery, cardiology  Primary care physician:  within 2 weeks    Diet:  regular diet   Activity: activity as tolerated  Disposition: Discharged to:   [x]Home, [][facility], []SNF, []Acute Rehab, []Hospice   Condition on discharge: Stable    Discharge Medications:      Eileen [facility] Medication Instructions [facility]:[id number]    Printed on:01/13/21 7067   Medication Information                      acetaminophen (TYLENOL) 325 MG tablet  Take 2 tablets by mouth every 4 hours as needed for Pain or Fever             aspirin 81 MG tablet  Take 81 mg by mouth daily             blood glucose monitor kit and supplies  Dispense sufficient amount for indicated testing frequency plus additional to accommodate PRN testing needs. Dispense all needed supplies to include: monitor, strips, lancing device, lancets, control solutions, alcohol swabs. blood glucose monitor strips  Test 2 times a day & as needed for symptoms of irregular blood glucose. Dispense sufficient amount for indicated testing frequency plus additional to accommodate PRN testing needs. FreeStyle Lancets MISC  1 each by Does not apply route daily             glyBURIDE (DIABETA) 5 MG tablet  Take 2 tablets by mouth 2 times daily (with meals)             HYDROcodone-acetaminophen (NORCO) 5-325 MG per tablet  Take 1 tablet by mouth every 6 hours as needed for Pain for up to 3 days.              lisinopril-hydroCHLOROthiazide (PRINZIDE;ZESTORETIC) 20-12.5 MG per tablet  TAKE 1 TABLET BY MOUTH ONCE DAILY             metFORMIN (GLUCOPHAGE) 500 MG tablet  Take 2 tablets by mouth 2 times daily (with meals) omeprazole (PRILOSEC) 20 MG delayed release capsule  TAKE 1 CAPSULE BY MOUTH ONCE DAILY IN THE MORNING             pioglitazone (ACTOS) 15 MG tablet  Take 1 tablet by mouth daily             vancomycin (VANCOCIN) infusion  Infuse 1,750 mg intravenously every 12 hours Compound per protocol. Objective Findings at Discharge:   BP (!) 130/93   Pulse 97   Temp 98.3 °F (36.8 °C) (Oral)   Resp 20   Ht 6' (1.829 m)   Wt 172 lb (78 kg)   SpO2 96%   BMI 23.33 kg/m²            GEN    Awake male, sitting upright in bed in no apparent distress. Appears given age. EYES   Pupils are equally round. No scleral erythema, discharge, or conjunctivitis. HENT  Mucous membranes are moist. Oral pharynx without exudates, no evidence of thrush. NECK  Supple, no apparent thyromegaly or masses. RESP  Clear to auscultation, no wheezes, rales or rhonchi. Symmetric chest movement while on room air. CARDIO/VASC           S1/S2 auscultated. Regular rate without appreciable murmurs, rubs, or gallops. No JVD or carotid bruits. Peripheral pulses equal bilaterally and palpable. No peripheral edema. GI        Abdomen is soft without significant tenderness, masses, or guarding. Bowel sounds are normoactive. Rectal exam deferred. MSK    right foot with dressing. SKIN    Normal coloration, warm, dry. NEURO           Cranial nerves appear grossly intact, normal speech, no lateralizing weakness. PSYCH            Awake, alert, oriented x 4. Affect appropriate.     BMP/CBC  Recent Labs     01/10/21  1758 01/12/21  1200 01/13/21  0358   *  --  135   K 4.4  --  4.1   CL 95*  --  100   CO2 19*  --  26   BUN 8  --  10   CREATININE 0.9  --  0.7*   WBC 9.2 6.0  --    HCT 36.2* 35.7*  --    * 474*  --        IMAGING:  Echocardiogram Complete 2d With Doppler With Color    Result Date: 1/11/2021  Transthoracic Echocardiography Report (TTE)  Demographics   Patient Name       Rosy Kamara      Date of Study 01/11/2021   Date of Birth      1980         Gender              Male   Age                36 year(s)         Race                   Patient Number     [id number]         Room Number         7748   Visit Number       [id number]   Corporate ID       [id number]   Accession Number   [id number]         Gina Lackey RDMS   Ordering Physician Bari Mccurdy MD      Physician           Frandy Brenner MD  Procedure Type of Study   TTE procedure:ECHOCARDIOGRAM COMPLETE 2D W DOPPLER W COLOR. Procedure Date Date: 01/11/2021 Start: 01:13 PM Study Location: Portable Technical Quality: Adequate visualization Indications:Chest pain. Patient Status: Routine Height: 72 inches Weight: 170 pounds BSA: 1.99 m2 BMI: 23.06 kg/m2 HR: 89 bpm BP: 107/76 mmHg  Conclusions   Summary  Left ventricular systolic function is normal.  Ejection fraction is visually estimated at 55-60%. Mild left ventricular hypertrophy. Indeterminate diastolic function; E/A flow reversal is noted. Trivial aortic regurgitation. Mitral annular calcification is present. No evidence of any pericardial effusion. Signature   ------------------------------------------------------------------  Electronically signed by Lenore Castillo MD  (Interpreting physician) on 01/11/2021 at 04:28 PM  ------------------------------------------------------------------   Findings   Left Ventricle  Left ventricular systolic function is normal.  Ejection fraction is visually estimated at 55-60%. Mild left ventricular hypertrophy. Indeterminate diastolic function; E/A flow reversal is noted. Left Atrium  Essentially normal left atrium. Right Atrium  Essentially normal right atrium. Right Ventricle  Essentially normal right ventricle. Aortic Valve  Trivial aortic regurgitation.    Mitral Valve cm/s  Velocity: 10.2 cm/s                                TR Gradient:4.84 mmHg  MV E' Lateral  Velocity: 17.7 cm/s  MV E/E' septal: 9.44  MV E/E' lateral: 5.44      Xr Foot Right (2 Views)    Result Date: 12/22/2020  EXAMINATION: THREE XRAY VIEWS OF THE RIGHT FOOT 12/22/2020 11:07 am COMPARISON: None HISTORY: ORDERING SYSTEM PROVIDED HISTORY: Diabetic ulcer of right midfoot associated with type 2 diabetes mellitus, with fat layer exposed ([address] TECHNOLOGIST PROVIDED HISTORY: Reason for exam:->rukle out osteomyelitis Reason for Exam: rule out osteomyelitis Acuity: Acute Type of Exam: Initial FINDINGS: Three views of the foot demonstrate no acute fracture or dislocation. Lisfranc alignment is maintained. Significant hammertoe deformity is present. Normal bony mineralization. No suspicious osseous lesion. No significant degenerative changes. No soft tissue swelling. Atherosclerosis is present. 1. No acute osseous abnormality of the right foot. No radiographic evidence of osteomyelitis. 2. Significant hammertoe deformity. 3. No obvious soft tissue injury/ulceration. Xr Foot Right (min 3 Views)    Result Date: 1/4/2021  EXAMINATION: THREE XRAY VIEWS OF THE RIGHT FOOT 1/4/2021 1:18 pm COMPARISON: 12/22/2020 HISTORY: ORDERING SYSTEM PROVIDED HISTORY: pain ulcer TECHNOLOGIST PROVIDED HISTORY: Reason for exam:->pain ulcer Reason for Exam: pain ulcer Pain, ulceration. FINDINGS: Hammertoe deformities are noted involving all the digits. There is evidence erosion of the distal 3rd metatarsal and the proximal aspect of the 3rd toe proximal phalanx. Osteolysis has increased when compared to the previous exam. There is associated soft tissue swelling along the ball of the foot. No soft tissue gas is detected. On lateral examination, subtalar joint unremarkable. Erosion of the distal 3rd metatarsal and the proximal aspect of the 3rd toe proximal phalanx, compatible with osteomyelitis.      Fl Less Than 1 Hour    Result Date: 1/8/2021  EXAMINATION: SPOT FLUOROSCOPIC IMAGES 1/8/2021 12:16 pm TECHNIQUE: Fluoroscopy was provided by the radiology department for procedure. Radiologist was not present during examination. FLUOROSCOPY DOSE AND TYPE OR TIME AND EXPOSURES: 3 fluoroscopic spot images obtained over 0.3 minutes of fluoroscopy time COMPARISON: None HISTORY: ORDERING SYSTEM PROVIDED HISTORY: partial foot amputation TECHNOLOGIST PROVIDED HISTORY: Reason for exam:->partial foot amputation Reason for Exam: partial foot amputation Acuity: Acute Type of Exam: Subsequent/Follow-up Intraprocedural imaging. FINDINGS: 3 spot images of the right foot were obtained. Images obtained for the purpose of guidance of intraoperative procedure. Please see performing physician note for full detail. Intraprocedural fluoroscopic spot images as above. See separate procedure report for more information. Xr Chest Portable    Result Date: 1/10/2021  EXAMINATION: ONE XRAY VIEW OF THE CHEST 1/10/2021 5:50 pm COMPARISON: Chest 01/04/2021 HISTORY: ORDERING SYSTEM PROVIDED HISTORY: chest pain FINDINGS: The cardiomediastinal and hilar silhouettes appear unremarkable. The lungs appear clear. No pleural effusion evident. No pneumothorax is seen. No acute osseous abnormality is identified. No radiographic evidence of acute cardiopulmonary disease. Xr Chest Portable    Result Date: 1/5/2021  EXAMINATION: ONE XRAY VIEW OF THE CHEST 1/4/2021 11:47 pm COMPARISON: June 13, 2020 HISTORY: ORDERING SYSTEM PROVIDED HISTORY: chest pain TECHNOLOGIST PROVIDED HISTORY: Reason for exam:->chest pain Reason for Exam: chest pain Acuity: Acute Type of Exam: Initial FINDINGS: The lungs are without acute focal process. There is no effusion or pneumothorax. The cardiomediastinal silhouette is without acute process. The osseous structures are without acute process. No acute process.      Vl Dup Lower Extremity Arteries Bilateral    Result Date: 1/5/2021  EXAMINATION: ARTERIAL DUPLEX ULTRASOUND OF THE BILATERAL LOWER EXTREMITIES  1/5/2021 11:03 am TECHNIQUE: Duplex ultrasound and Doppler images were obtained of the bilateral lower extremities COMPARISON: None. HISTORY: ORDERING SYSTEM PROVIDED HISTORY: right CLI TECHNOLOGIST PROVIDED HISTORY: Reason for exam:->right CLI Reason for Exam: rt foot wound Acuity: Unknown Type of Exam: Unknown FINDINGS: Triphasic waveforms are maintained at the common femoral arteries bilaterally suggesting no significant inflow stenosis. Multiphasic waveforms are maintained throughout both lower extremities. No significant focal stenosis is identified. There is some mild atherosclerotic plaque noted. Right: Flow velocities were measured as follows: Com. Fem. 94 cm/sec Prof.            64 cm/sec SFA Prox. 78 cm/sec SFA Mid.      118 cm/sec SFA Dist.      81 cm/sec Pop.             79, 81 cm/sec PTA             47, 40, 66 cm/sec Peron. 49, 55, 53 cm/sec WALESKA             72, 78, 122 cm/sec Left: Flow velocities were measured as follows: Com. Fem. 71 cm/sec Prof.            56 cm/sec SFA Prox. 66 cm/sec SFA Mid.      79 cm/sec SFA Dist.      58 cm/sec Pop.             31, 37 cm/sec PTA             40, 37, 37 cm/sec Peron. 34, 37, 44 cm/sec WALESKA             48, 43, 62 cm/sec     There is some mild atherosclerotic plaque noted, however normal multiphasic waveforms are seen throughout both lower extremities bilaterally with no significant focal stenosis seen within the right or left lower extremity. No arterial occlusion. Patent three-vessel runoff to both feet.      Mri Foot Right Wo Contrast    Result Date: 1/6/2021  EXAMINATION: MRI OF THE RIGHT FOOT WITHOUT CONTRAST, 1/6/2021 11:05 am TECHNIQUE: Multiplanar multisequence MRI of the right foot was performed without the administration of intravenous contrast. COMPARISON: Right foot radiograph January 4, 2021 and December 22, 2020 HISTORY: ORDERING SYSTEM PROVIDED HISTORY: surgical planning TECHNOLOGIST PROVIDED HISTORY: Reason for exam:->surgical planning Reason for Exam: osteomyelitis, possible surgery FINDINGS: LISFRANC JOINT:  Lisfranc ligament is visualized and is intact. The alignment of the tarsal-metatarsal joint is anatomic. BONE MARROW: Bone marrow edema, confluent decreased T1 signal loss, and destructive changes involving the right 3rd metatarsal head and metatarsal neck and base of the proximal phalanx of the 3rd toe consistent with osteomyelitis. Edema within the 3rd metatarsal extends to the level of the metatarsal base with associated confluent decreased T1 signal extending to the metatarsal base consistent with osteomyelitis extending the length of the 3rd metatarsal.  Marrow edema also identified within the middle and distal phalanges of the 3rd toe with preserved T1 signal.  Findings may reflect reactive noninfectious osteitis, however, early changes of osteomyelitis are of concern given the adjacent organized fluid collection and soft tissue findings at the 3rd toe. Edema seen at the 2nd metatarsal head with patchy decreased T1 signal along the lateral aspect of the 2nd metatarsal head most consistent with osteomyelitis given adjacent soft tissue findings. Very minimal edema extends along the 2nd metatarsal more proximally with preserved T1 signal of the more proximal 2nd metatarsal.  Patchy edema of the 4th metatarsal head along its medial aspect with grossly preserved T1 signal.  Findings may reflect reactive noninfectious osteitis, however, early changes of osteomyelitis difficult to exclude given adjacent soft tissue findings. JOINTS: Destructive changes at the 3rd metatarsophalangeal joint with joint effusion present. Findings consistent with septic joint. Mild degenerative change of the 1st MTP joint. Flexion deformities of the 1st through 5th toes.  SOFT TISSUES: Subcutaneous edema throughout the soft tissues of the foot diffusely with organized fluid collection surrounding the 3rd MTP joint and extending into the 3rd toe which is difficult to accurately measure given flexion deformity of the toe. The collection measures approximately 1.7 x 2.8 x 3.7 cm. Associated ulceration at the 3rd toe. TENDONS: The distal 3rd flexor and extensor tendons are likely disrupted and are not well delineated with abnormal intrasubstance signal present. 1. Septic arthritis of the right 3rd MTP joint with associated joint effusion and surrounding organized fluid collection measuring approximately 1.7 x 2.8 x 3.7 cm which is most consistent with abscess of the right 3rd toe. Associated ulceration also present. Underlying destructive changes of the 3rd MTP joint compatible with osteomyelitis. Marrow signal changes extend to the base of the 3rd metatarsal with associated confluent decreased T1 signal consistent with osteomyelitis of the 3rd metatarsal extending to the metatarsal base. Osteomyelitis also identified at the proximal phalanx of the 3rd toe. Patchy edema of the middle and distal phalanges with preserved T1 signal.  Findings suspicious for early changes of osteomyelitis of the distal 3rd toe given the surrounding soft tissue findings. 2. Edema of the 2nd metatarsal head with patchy decreased T1 signal along the lateral aspect of the 2nd metatarsal head. Findings most consistent with osteomyelitis. 3. Edema of the 4th metatarsal head with preserved T1 signal.  Findings may reflect reactive noninfectious osteitis, however, early changes of osteomyelitis cannot be excluded given surrounding soft tissue changes. 4. Likely destruction/infectious involvement of the distal most 3rd flexor and extensor tendons.      Nm Myocardial Spect Rest Exercise Or Rx    Result Date: 1/12/2021  Cardiac Perfusion Imaging   Demographics   Patient Name      Tabitha Duran      Date of study        01/12/2021   Date of Birth     1980         Gender Male   Age               36 year(s)         Race                    Patient Number    [id number]         Room Number          3731   Visit Number      [id number]          Height               72 inches   Corporate ID      [id number]           Weight               170 pounds   Accession Number  [id number]                                        NM Technologist      Bee Rene                                                            [facility]   Ordering          Jessica Orleans   Interpreting         Jessica Mail'Inside  Physician         Carolina Dalal MD      Cardiologist         Carolina Dalal MD   Conclusions   Summary  Normal LV function. NORMAL EDV  There is normal isotope uptake following exercise and at rest. There is no  evidence of exercise induced ischemia. This is a normal study. Recommendation  Recommendation: Routine follow-up. Signatures   ------------------------------------------------------------------  Electronically signed by Ruslan Emanuel MD  (Interpreting cardiologist) on 01/12/2021 at 14:29  ------------------------------------------------------------------  Procedure Procedure Type:   Nuclear Stress Test:Pharmacological, Myocardial Perfusion Imaging with  Pharm, NM MYOCARDIAL SPECT REST EXERCISE OR RX  Indications: Chest pain. Risk Factors   The patient risk factors include:hypertension and diabetes mellitus. Stress Protocols   Resting ECG  Normal sinus rhythm. Resting HR:91 bpm  Resting BP:125/83 mmHg  Stress Protocol:Pharmacologic - Lexiscan  Peak HR:113 bpm                              HR/BP product:67688  Peak BP:132/86 mmHg  Predicted HR: 180 bpm  % of predicted HR: 63   Exercise duration: 01:01 min  Reason for termination:Completed   ECG Findings  NSR   Symptoms  Headache. The patient was given an intravenous injection of Aminophylline to relieve  symptoms from [place].    Stress Interpretation  ECG portion of stress test is negative for ischemia by diagnostic criteria. Procedure Medications   - Lexiscan I.V. bolus (over 15sec.) 0.4 mg admininstered @ 01/12/2021 09:20.   - Aminophylline I.V. 75 admininstered @ 01/12/2021 09:23. Imaging Protocols   Rest                             Stress   Isotope:Sestamibi 99mTc          Isotope: Sestamibi 99mTc  Isotope dose:10.7 mCi            Isotope dose:32.9 mCi  Administration route: I.V. Administration route: I.V. Injection Date:01/12/2021 07:55  Injection Date:01/12/2021 09:20  Scan Date:01/12/2021 08:55       Scan Date:01/12/2021 10:20   Technique:        SPECT          Technique:        Gated                                                     SPECT   Perfusion Interpretation   Normal tracer uptake in all myocardial segment during rest and stress. Imaging Results    Summed scores     - Summed stress score: 2     - Summed rest score: 0     - Summed difference score:    2   Rest ejection  Ejection fraction:57 %  EDV :67 ml  ESV :29 ml  Stroke volume :38 ml  Medical History   Accession#:  [id number]  Admission Data Admission date: 01/04/2021 Admission Time: 15:24 Hospital Status: Inpatient.     Discharge Time of 20 Minutes    Electronically signed by Mya Conn MD on 1/13/2021 at 3:16 PM

## 2021-01-13 NOTE — PROGRESS NOTES
[id number] Liaison spoke with pt and pt is agreeable to [facility] at discharge.  Please place inpatient consult to home health needs order in [facility] [place]. Faxed IV script to [phone] and [place]. Pt agreeable to learn.

## 2021-01-13 NOTE — CARE COORDINATION
Pt plans remain the same, HC at discharge. Pt will need HHC order at discharge and prescription for IV antibiotics. CM will need orders placed in the computer for a WC. The following must also be placed in a full progress note. Lavonne Rubio was evaluated today and a DME order was entered for a standard wheelchair because he requires this to successfully complete daily living tasks of eating, bathing, toileting, personal cares, ambulating, grooming, hygiene, dressing upper body, dressing lower body, meal preparation and taking own medications. A standard manual wheelchair is necessary due to patient's impaired ambulation and mobility restrictions and would be unable to resolve these daily living tasks using a cane or walker. The patient is capable of using a standard wheelchair safely in their home and can maneuver within their home with adequate access. There is a caregiver available to provide necessary assistance. The need for this equipment was discussed with the patient and he understands, is in agreement, and has not expressed an unwillingness to use the wheelchair.

## 2021-01-14 ENCOUNTER — TELEPHONE (OUTPATIENT)
Dept: FAMILY MEDICINE CLINIC | Age: 41
End: 2021-01-14

## 2021-01-15 NOTE — TELEPHONE ENCOUNTER
I understand the patient has home care or I do agree that patient needs home care. Either 1 I agree.

## 2021-01-18 ENCOUNTER — HOSPITAL ENCOUNTER (OUTPATIENT)
Age: 41
Setting detail: SPECIMEN
Discharge: HOME | End: 2021-01-18
Payer: COMMERCIAL

## 2021-01-18 LAB
ALBUMIN SERPL-MCNC: 3.5 GM/DL (ref 3.4–5)
ALP BLD-CCNC: 50 IU/L (ref 40–128)
ALT SERPL-CCNC: 19 U/L (ref 10–40)
ANION GAP SERPL CALCULATED.3IONS-SCNC: 11 MMOL/L (ref 4–16)
AST SERPL-CCNC: 19 IU/L (ref 15–37)
BASOPHILS ABSOLUTE: 0.1 K/CU MM
BASOPHILS RELATIVE PERCENT: 0.6 % (ref 0–1)
BILIRUB SERPL-MCNC: 0.4 MG/DL (ref 0–1)
BUN BLDV-MCNC: 10 MG/DL (ref 6–23)
C-REACTIVE PROTEIN, HIGH SENSITIVITY: 8.5 MG/L
CALCIUM SERPL-MCNC: 8.3 MG/DL (ref 8.3–10.6)
CHLORIDE BLD-SCNC: 96 MMOL/L (ref 99–110)
CO2: 26 MMOL/L (ref 21–32)
CREAT SERPL-MCNC: 0.9 MG/DL (ref 0.9–1.3)
DIFFERENTIAL TYPE: ABNORMAL
DOSE AMOUNT: NORMAL
DOSE TIME: NORMAL
EOSINOPHILS ABSOLUTE: 0.1 K/CU MM
EOSINOPHILS RELATIVE PERCENT: 1.6 % (ref 0–3)
ERYTHROCYTE SEDIMENTATION RATE: 72 MM/HR (ref 0–15)
GFR AFRICAN AMERICAN: >60 ML/MIN/1.73M2
GFR NON-AFRICAN AMERICAN: >60 ML/MIN/1.73M2
GLUCOSE BLD-MCNC: 189 MG/DL (ref 70–99)
HCT VFR BLD CALC: 34.4 % (ref 42–52)
HEMOGLOBIN: 11 GM/DL (ref 13.5–18)
IMMATURE NEUTROPHIL %: 0.3 % (ref 0–0.43)
LYMPHOCYTES ABSOLUTE: 1.4 K/CU MM
LYMPHOCYTES RELATIVE PERCENT: 17 % (ref 24–44)
MCH RBC QN AUTO: 30.1 PG (ref 27–31)
MCHC RBC AUTO-ENTMCNC: 32 % (ref 32–36)
MCV RBC AUTO: 94.2 FL (ref 78–100)
MONOCYTES ABSOLUTE: 0.6 K/CU MM
MONOCYTES RELATIVE PERCENT: 7.8 % (ref 0–4)
NUCLEATED RBC %: 0 %
PDW BLD-RTO: 12.6 % (ref 11.7–14.9)
PLATELET # BLD: 456 K/CU MM (ref 140–440)
PMV BLD AUTO: 10.5 FL (ref 7.5–11.1)
POTASSIUM SERPL-SCNC: 3.9 MMOL/L (ref 3.5–5.1)
RBC # BLD: 3.65 M/CU MM (ref 4.6–6.2)
SEGMENTED NEUTROPHILS ABSOLUTE COUNT: 5.8 K/CU MM
SEGMENTED NEUTROPHILS RELATIVE PERCENT: 72.7 % (ref 36–66)
SODIUM BLD-SCNC: 133 MMOL/L (ref 135–145)
TOTAL IMMATURE NEUTOROPHIL: 0.02 K/CU MM
TOTAL NUCLEATED RBC: 0 K/CU MM
TOTAL PROTEIN: 7.4 GM/DL (ref 6.4–8.2)
VANCOMYCIN TROUGH: 17.6 UG/ML (ref 10–20)
WBC # BLD: 7.9 K/CU MM (ref 4–10.5)

## 2021-01-18 PROCEDURE — 80202 ASSAY OF VANCOMYCIN: CPT

## 2021-01-18 PROCEDURE — 85025 COMPLETE CBC W/AUTO DIFF WBC: CPT

## 2021-01-18 PROCEDURE — 85652 RBC SED RATE AUTOMATED: CPT

## 2021-01-18 PROCEDURE — 86140 C-REACTIVE PROTEIN: CPT

## 2021-01-18 PROCEDURE — 80053 COMPREHEN METABOLIC PANEL: CPT

## 2021-01-19 ENCOUNTER — HOSPITAL ENCOUNTER (OUTPATIENT)
Dept: WOUND CARE | Age: 41
Discharge: HOME | End: 2021-01-19
Payer: COMMERCIAL

## 2021-01-19 DIAGNOSIS — E11.621 DIABETIC ULCER OF RIGHT MIDFOOT ASSOCIATED WITH TYPE 2 DIABETES MELLITUS, WITH NECROSIS OF BONE (HCC): ICD-10-CM

## 2021-01-19 DIAGNOSIS — L97.412 DIABETIC ULCER OF RIGHT MIDFOOT ASSOCIATED WITH TYPE 2 DIABETES MELLITUS, WITH FAT LAYER EXPOSED (HCC): Primary | ICD-10-CM

## 2021-01-19 DIAGNOSIS — E11.621 DIABETIC ULCER OF RIGHT MIDFOOT ASSOCIATED WITH TYPE 2 DIABETES MELLITUS, WITH FAT LAYER EXPOSED (HCC): Primary | ICD-10-CM

## 2021-01-19 DIAGNOSIS — L97.414 DIABETIC ULCER OF RIGHT MIDFOOT ASSOCIATED WITH TYPE 2 DIABETES MELLITUS, WITH NECROSIS OF BONE (HCC): ICD-10-CM

## 2021-01-19 PROCEDURE — 99213 OFFICE O/P EST LOW 20 MIN: CPT

## 2021-01-19 PROCEDURE — 99213 OFFICE O/P EST LOW 20 MIN: CPT | Performed by: NURSE PRACTITIONER

## 2021-01-19 RX ORDER — BACITRACIN, NEOMYCIN, POLYMYXIN B 400; 3.5; 5 [USP'U]/G; MG/G; [USP'U]/G
OINTMENT TOPICAL ONCE
Status: CANCELLED | OUTPATIENT
Start: 2021-01-19 | End: 2021-01-19

## 2021-01-19 RX ORDER — LIDOCAINE 50 MG/G
OINTMENT TOPICAL ONCE
Status: CANCELLED | OUTPATIENT
Start: 2021-01-19 | End: 2021-01-19

## 2021-01-19 RX ORDER — LIDOCAINE HYDROCHLORIDE 20 MG/ML
JELLY TOPICAL ONCE
Status: CANCELLED | OUTPATIENT
Start: 2021-01-19 | End: 2021-01-19

## 2021-01-19 RX ORDER — CLOBETASOL PROPIONATE 0.5 MG/G
OINTMENT TOPICAL ONCE
Status: CANCELLED | OUTPATIENT
Start: 2021-01-19 | End: 2021-01-19

## 2021-01-19 RX ORDER — BACITRACIN ZINC AND POLYMYXIN B SULFATE 500; 1000 [USP'U]/G; [USP'U]/G
OINTMENT TOPICAL ONCE
Status: CANCELLED | OUTPATIENT
Start: 2021-01-19 | End: 2021-01-19

## 2021-01-19 RX ORDER — BETAMETHASONE DIPROPIONATE 0.05 %
OINTMENT (GRAM) TOPICAL ONCE
Status: CANCELLED | OUTPATIENT
Start: 2021-01-19 | End: 2021-01-19

## 2021-01-19 RX ORDER — LIDOCAINE 40 MG/G
CREAM TOPICAL ONCE
Status: CANCELLED | OUTPATIENT
Start: 2021-01-19 | End: 2021-01-19

## 2021-01-19 RX ORDER — GENTAMICIN SULFATE 1 MG/G
OINTMENT TOPICAL ONCE
Status: CANCELLED | OUTPATIENT
Start: 2021-01-19 | End: 2021-01-19

## 2021-01-19 RX ORDER — LIDOCAINE HYDROCHLORIDE 40 MG/ML
SOLUTION TOPICAL ONCE
Status: CANCELLED | OUTPATIENT
Start: 2021-01-19 | End: 2021-01-19

## 2021-01-19 RX ORDER — GINSENG 100 MG
CAPSULE ORAL ONCE
Status: CANCELLED | OUTPATIENT
Start: 2021-01-19 | End: 2021-01-19

## 2021-01-19 NOTE — PROGRESS NOTES
pneumothorax. No history of pacemaker or AICD. No history of sudden CHF. No spherocytoses, sickle cell disease, or other hemoglobinopathy known. No history of optic neuritis or significant visual impairment. Past Surgical History:        Procedure Laterality Date    ACHILLES TENDON SURGERY Right 1/8/2021    RIGHT ACHILLES TENDON LENGTHENING REPAIR performed by Kandis Steele DPM at [address]  03/2018 [facility]    DENTAL SURGERY      teeth extractions -half per patient    HERNIA REPAIR Bilateral 5/27/2020    BIALTERAL HERNIA INGUINAL REPAIR performed by Donald Joe MD at [address] [facility]  2018    [place] OFFICE/OUTPT VISIT,PROCEDURE ONLY Left 4/17/2018    L5-S1 HEMILAMINECTOMY, REMOVAL OF DISC LEFT SIDE performed by Grace Burton MD at [address] [facility] Right 1/8/2021    RIGHT TRANSMETATARSAL TOE AMPUTATION performed by Kandis Steele DPM at [address] [facility]           Medications Prior to Admission:    Prior to Admission medications    Medication Sig Start Date End Date Taking? Authorizing Provider   vancomycin (VANCOCIN) infusion Infuse 1,750 mg intravenously every 12 hours Compound per protocol. 1/13/21 2/18/21 Yes Eve Monzon MD   omeprazole (PRILOSEC) 20 MG delayed release capsule TAKE 1 CAPSULE BY MOUTH ONCE DAILY IN THE MORNING 7/23/20  Yes Laurie German MD   lisinopril-hydroCHLOROthiazide [facility]) 20-12.5 MG per tablet TAKE 1 TABLET BY MOUTH ONCE DAILY 7/23/20  Yes Laurie German MD   blood glucose monitor kit and supplies Dispense sufficient amount for indicated testing frequency plus additional to accommodate PRN testing needs. Dispense all needed supplies to include: monitor, strips, lancing device, lancets, control solutions, alcohol swabs.  6/15/20  Yes Arlette Dubois MD   blood glucose monitor strips Test 2 times a day & as needed for symptoms of irregular blood glucose. Dispense sufficient amount for indicated testing frequency plus additional to accommodate PRN testing needs. 6/15/20  Yes Valery Carballo MD   FreeStyle Lancets [address] 1 each by Does not apply route daily 6/15/20  Yes Valery Carballo MD   aspirin 81 MG tablet Take 81 mg by mouth daily   Yes Historical Provider, MD   acetaminophen (TYLENOL) 325 MG tablet Take 2 tablets by mouth every 4 hours as needed for Pain or Fever 2/28/18  Yes Jv George MD   pioglitazone (ACTOS) 15 MG tablet Take 1 tablet by mouth daily 10/1/20 1/4/21  Yonis Naylor MD   metFORMIN (GLUCOPHAGE) 500 MG tablet Take 2 tablets by mouth 2 times daily (with meals) 7/23/20 1/4/21  Yonis Naylor MD   glyBURIDE (DIABETA) 5 MG tablet Take 2 tablets by mouth 2 times daily (with meals) 7/23/20 1/4/21  Yonis Naylor MD       Allergies:  Patient has no known allergies. Family history:  See information in Epic chart. Positive for diabetes, hypertension and coronary artery disease. Social History:   TOBACCO:   reports that he has never smoked. He has never used smokeless tobacco.  ETOH:   reports current alcohol use.  home with a reliable caregiver  ROS:     REVIEW OF SYSTEMS    Constitutional: negative for anorexia, chills, fatigue, fevers, malaise and sweats  Respiratory: negative for cough, dyspnea on exertion, hemoptysis, pleurisy/chest pain, shortness of breath, sputum, stridor and wheezing  Cardiovascular: negative for chest pain, chest pressure/discomfort, claudication, dyspnea, exertional chest pressure/discomfort, lower extremity edema, near-syncope, orthopnea, palpitations, paroxysmal nocturnal dyspnea, syncope and tachypnea  Integument/breast: positive for skin lesion(s) transmetatarsal amputation.         Objective:     Physical exam:    General Appearance: alert and oriented to person, place and time, well-developed and well-nourished, in no acute distress  ENT: tympanic membrane, external ear and ear canal normal bilaterally, oropharynx clear and moist with normal mucous membranes  Pulmonary/Chest: clear to auscultation bilaterally- no wheezes, rales or rhonchi, normal air movement, no respiratory distress  Cardiovascular: normal rate, normal S1 and S2, no gallops and intact distal pulses    Wound exam:      Measurements shown are from today's visit. Wound 01/19/21 Foot Anterior;Right #1 Right foot amputation site (Active)   Wound Image   01/19/21 1331   Wound Etiology Surgical 01/19/21 1331   Dressing Status New dressing applied 01/19/21 1358   Wound Cleansed Soap and water 01/19/21 1331   Wound Length (cm) 14.5 cm 01/19/21 1331   Wound Width (cm) 0.2 cm 01/19/21 1331   Wound Depth (cm) 0.2 cm 01/19/21 1331   Wound Surface Area (cm^2) 2.9 cm^2 01/19/21 1331   Wound Volume (cm^3) 0.58 cm^3 01/19/21 1331   Distance Tunneling (cm) 0 cm 01/19/21 1331   Tunneling Position ___ O'Clock 0 01/19/21 1331   Undermining Starts ___ O'Clock 0 01/19/21 1331   Undermining Ends___ O'Clock 0 01/19/21 1331   Undermining Maxium Distance (cm) 0 01/19/21 1331   Wound Assessment Other (Comment) 01/19/21 1331   Drainage Amount Moderate 01/19/21 1331   Drainage Description Serosanguinous 01/19/21 1331   Odor None 01/19/21 1331   Rosalie-wound Assessment Intact 01/19/21 1331   Margins Attached edges; Defined edges 01/19/21 1331   Wound Thickness Description not for Pressure Injury Full thickness 01/19/21 1331   Number of days: 0      Assessment       Miriam Crocker  appears to have a non-healing wound of the  Right foot. The etiology of the wound is felt to be diabetes, pressure and infection. There are multiple complicating factors including  diabetes and hypertension. Hyperbaric oxygen would be an appropriate and essential adjunct in the treatment and resolution of his diabetic Mathias 3 wound. A comprehensive wound management program should also be continued.   This patient has no contraindications to HBO therapy and would be considered appropriate candidate. This patient appears to have sufficient physiologic and psychologic stamina to undergo Hyperbaric Oxygen Therapy. Plan     Initiate HBO per order sheet        Diagnostic Impression:     Diabetic foot ulcer Mathias's stage 3  Non-healing wound of the  Right foot   Osteomyelitis and septic arthritis of the 3rd MTP joint, osteomyelitis from the base of the 3rd metatarsal head to metatarsal base, proximal phlalanx 3rd and probable 2 and 4 th metatarsal head.   Diabetes      Beena Madrid MD    1/19/2021

## 2021-01-25 ENCOUNTER — HOSPITAL ENCOUNTER (OUTPATIENT)
Age: 41
Setting detail: SPECIMEN
Discharge: HOME | End: 2021-01-25
Payer: COMMERCIAL

## 2021-01-25 LAB
ANION GAP SERPL CALCULATED.3IONS-SCNC: 12 MMOL/L (ref 4–16)
BASOPHILS ABSOLUTE: 0 K/CU MM
BASOPHILS RELATIVE PERCENT: 0.4 % (ref 0–1)
BUN BLDV-MCNC: 9 MG/DL (ref 6–23)
C-REACTIVE PROTEIN, HIGH SENSITIVITY: 7.1 MG/L
CALCIUM SERPL-MCNC: 8.6 MG/DL (ref 8.3–10.6)
CHLORIDE BLD-SCNC: 100 MMOL/L (ref 99–110)
CO2: 28 MMOL/L (ref 21–32)
CREAT SERPL-MCNC: 0.9 MG/DL (ref 0.9–1.3)
DIFFERENTIAL TYPE: ABNORMAL
DOSE AMOUNT: NORMAL
DOSE TIME: NORMAL
EOSINOPHILS ABSOLUTE: 0.1 K/CU MM
EOSINOPHILS RELATIVE PERCENT: 1 % (ref 0–3)
ERYTHROCYTE SEDIMENTATION RATE: 45 MM/HR (ref 0–15)
GFR AFRICAN AMERICAN: >60 ML/MIN/1.73M2
GFR NON-AFRICAN AMERICAN: >60 ML/MIN/1.73M2
GLUCOSE BLD-MCNC: 124 MG/DL (ref 70–99)
HCT VFR BLD CALC: 37.2 % (ref 42–52)
HEMOGLOBIN: 11.8 GM/DL (ref 13.5–18)
IMMATURE NEUTROPHIL %: 0.3 % (ref 0–0.43)
LYMPHOCYTES ABSOLUTE: 1.9 K/CU MM
LYMPHOCYTES RELATIVE PERCENT: 27.9 % (ref 24–44)
MCH RBC QN AUTO: 30.3 PG (ref 27–31)
MCHC RBC AUTO-ENTMCNC: 31.7 % (ref 32–36)
MCV RBC AUTO: 95.6 FL (ref 78–100)
MONOCYTES ABSOLUTE: 0.8 K/CU MM
MONOCYTES RELATIVE PERCENT: 11.8 % (ref 0–4)
NUCLEATED RBC %: 0 %
PDW BLD-RTO: 12.8 % (ref 11.7–14.9)
PLATELET # BLD: 462 K/CU MM (ref 140–440)
PMV BLD AUTO: 10.1 FL (ref 7.5–11.1)
POTASSIUM SERPL-SCNC: 4.3 MMOL/L (ref 3.5–5.1)
PROCALCITONIN: 0.06
RBC # BLD: 3.89 M/CU MM (ref 4.6–6.2)
SEGMENTED NEUTROPHILS ABSOLUTE COUNT: 4 K/CU MM
SEGMENTED NEUTROPHILS RELATIVE PERCENT: 58.6 % (ref 36–66)
SODIUM BLD-SCNC: 140 MMOL/L (ref 135–145)
TOTAL IMMATURE NEUTOROPHIL: 0.02 K/CU MM
TOTAL NUCLEATED RBC: 0 K/CU MM
VANCOMYCIN TROUGH: 17.5 UG/ML (ref 10–20)
WBC # BLD: 6.8 K/CU MM (ref 4–10.5)

## 2021-01-25 PROCEDURE — 86140 C-REACTIVE PROTEIN: CPT

## 2021-01-25 PROCEDURE — 80048 BASIC METABOLIC PNL TOTAL CA: CPT

## 2021-01-25 PROCEDURE — 85652 RBC SED RATE AUTOMATED: CPT

## 2021-01-25 PROCEDURE — 85025 COMPLETE CBC W/AUTO DIFF WBC: CPT

## 2021-01-25 PROCEDURE — 84145 PROCALCITONIN (PCT): CPT

## 2021-01-25 PROCEDURE — 80202 ASSAY OF VANCOMYCIN: CPT

## 2021-02-01 ENCOUNTER — HOSPITAL ENCOUNTER (OUTPATIENT)
Age: 41
Setting detail: SPECIMEN
Discharge: HOME | End: 2021-02-01
Payer: COMMERCIAL

## 2021-02-01 LAB
ANION GAP SERPL CALCULATED.3IONS-SCNC: 12 MMOL/L (ref 4–16)
BASOPHILS ABSOLUTE: 0 K/CU MM
BASOPHILS RELATIVE PERCENT: 0.2 % (ref 0–1)
BUN BLDV-MCNC: 10 MG/DL (ref 6–23)
C-REACTIVE PROTEIN, HIGH SENSITIVITY: 6.3 MG/L
CALCIUM SERPL-MCNC: 8.9 MG/DL (ref 8.3–10.6)
CHLORIDE BLD-SCNC: 96 MMOL/L (ref 99–110)
CO2: 27 MMOL/L (ref 21–32)
CREAT SERPL-MCNC: 0.9 MG/DL (ref 0.9–1.3)
DIFFERENTIAL TYPE: ABNORMAL
DOSE AMOUNT: NORMAL
DOSE TIME: NORMAL
EOSINOPHILS ABSOLUTE: 0.1 K/CU MM
EOSINOPHILS RELATIVE PERCENT: 2 % (ref 0–3)
ERYTHROCYTE SEDIMENTATION RATE: 47 MM/HR (ref 0–15)
GFR AFRICAN AMERICAN: >60 ML/MIN/1.73M2
GFR NON-AFRICAN AMERICAN: >60 ML/MIN/1.73M2
GLUCOSE BLD-MCNC: 72 MG/DL (ref 70–99)
HCT VFR BLD CALC: 37.4 % (ref 42–52)
HEMOGLOBIN: 12.5 GM/DL (ref 13.5–18)
IMMATURE NEUTROPHIL %: 0.4 % (ref 0–0.43)
LYMPHOCYTES ABSOLUTE: 1.7 K/CU MM
LYMPHOCYTES RELATIVE PERCENT: 31.4 % (ref 24–44)
MCH RBC QN AUTO: 30.9 PG (ref 27–31)
MCHC RBC AUTO-ENTMCNC: 33.4 % (ref 32–36)
MCV RBC AUTO: 92.3 FL (ref 78–100)
MONOCYTES ABSOLUTE: 0.7 K/CU MM
MONOCYTES RELATIVE PERCENT: 11.7 % (ref 0–4)
NUCLEATED RBC %: 0 %
PDW BLD-RTO: 12.9 % (ref 11.7–14.9)
PLATELET # BLD: 400 K/CU MM (ref 140–440)
PMV BLD AUTO: 10.1 FL (ref 7.5–11.1)
POTASSIUM SERPL-SCNC: 4.3 MMOL/L (ref 3.5–5.1)
PROCALCITONIN: 0.05
RBC # BLD: 4.05 M/CU MM (ref 4.6–6.2)
SEGMENTED NEUTROPHILS ABSOLUTE COUNT: 3 K/CU MM
SEGMENTED NEUTROPHILS RELATIVE PERCENT: 54.3 % (ref 36–66)
SODIUM BLD-SCNC: 135 MMOL/L (ref 135–145)
TOTAL IMMATURE NEUTOROPHIL: 0.02 K/CU MM
TOTAL NUCLEATED RBC: 0 K/CU MM
VANCOMYCIN TROUGH: 20 UG/ML (ref 10–20)
WBC # BLD: 5.6 K/CU MM (ref 4–10.5)

## 2021-02-01 PROCEDURE — 80048 BASIC METABOLIC PNL TOTAL CA: CPT

## 2021-02-01 PROCEDURE — 80202 ASSAY OF VANCOMYCIN: CPT

## 2021-02-01 PROCEDURE — 84145 PROCALCITONIN (PCT): CPT

## 2021-02-01 PROCEDURE — 86140 C-REACTIVE PROTEIN: CPT

## 2021-02-01 PROCEDURE — 85652 RBC SED RATE AUTOMATED: CPT

## 2021-02-01 PROCEDURE — 85025 COMPLETE CBC W/AUTO DIFF WBC: CPT

## 2021-02-05 ENCOUNTER — OFFICE VISIT (OUTPATIENT)
Dept: FAMILY MEDICINE CLINIC | Age: 41
End: 2021-02-05
Payer: COMMERCIAL

## 2021-02-05 VITALS
HEART RATE: 102 BPM | WEIGHT: 170 LBS | HEIGHT: 71 IN | BODY MASS INDEX: 23.8 KG/M2 | OXYGEN SATURATION: 99 % | TEMPERATURE: 96.8 F | DIASTOLIC BLOOD PRESSURE: 62 MMHG | SYSTOLIC BLOOD PRESSURE: 118 MMHG

## 2021-02-05 DIAGNOSIS — S98.911D AMPUTATION OF RIGHT FOOT, SUBSEQUENT ENCOUNTER (HCC): Primary | ICD-10-CM

## 2021-02-05 DIAGNOSIS — K21.9 GASTROESOPHAGEAL REFLUX DISEASE WITHOUT ESOPHAGITIS: ICD-10-CM

## 2021-02-05 DIAGNOSIS — I10 ESSENTIAL HYPERTENSION: ICD-10-CM

## 2021-02-05 PROCEDURE — G8420 CALC BMI NORM PARAMETERS: HCPCS | Performed by: PHYSICIAN ASSISTANT

## 2021-02-05 PROCEDURE — G8482 FLU IMMUNIZE ORDER/ADMIN: HCPCS | Performed by: PHYSICIAN ASSISTANT

## 2021-02-05 PROCEDURE — 1111F DSCHRG MED/CURRENT MED MERGE: CPT | Performed by: PHYSICIAN ASSISTANT

## 2021-02-05 PROCEDURE — 2022F DILAT RTA XM EVC RTNOPTHY: CPT | Performed by: PHYSICIAN ASSISTANT

## 2021-02-05 PROCEDURE — G8427 DOCREV CUR MEDS BY ELIG CLIN: HCPCS | Performed by: PHYSICIAN ASSISTANT

## 2021-02-05 PROCEDURE — 1036F TOBACCO NON-USER: CPT | Performed by: PHYSICIAN ASSISTANT

## 2021-02-05 PROCEDURE — 99214 OFFICE O/P EST MOD 30 MIN: CPT | Performed by: PHYSICIAN ASSISTANT

## 2021-02-05 PROCEDURE — 3051F HG A1C>EQUAL 7.0%<8.0%: CPT | Performed by: PHYSICIAN ASSISTANT

## 2021-02-05 RX ORDER — OMEPRAZOLE 20 MG/1
CAPSULE, DELAYED RELEASE ORAL
Qty: 90 CAPSULE | Refills: 1 | Status: SHIPPED | OUTPATIENT
Start: 2021-02-05 | End: 2021-08-17

## 2021-02-05 RX ORDER — GLYBURIDE 5 MG/1
10 TABLET ORAL 2 TIMES DAILY WITH MEALS
Qty: 360 TABLET | Refills: 1 | Status: SHIPPED | OUTPATIENT
Start: 2021-02-05 | End: 2022-01-24 | Stop reason: SDUPTHER

## 2021-02-05 RX ORDER — LISINOPRIL AND HYDROCHLOROTHIAZIDE 20; 12.5 MG/1; MG/1
TABLET ORAL
Qty: 90 TABLET | Refills: 1 | Status: SHIPPED | OUTPATIENT
Start: 2021-02-05 | End: 2021-04-07 | Stop reason: ALTCHOICE

## 2021-02-05 NOTE — PROGRESS NOTES
2/5/2021    Gita Jiang    Chief Complaint   Patient presents with    Orders     face to face for hc    Depression     pt states depression not working and be limited in activity since surg       HPI  History obtained from the patient. Kamila Kidd is a 36 y.o. male who presents today for face to face for home care. The patient recently underwent an amputation of his right forefoot. He had ulcers, necrosis, osteomyelitis, and bacteremia. He is currently being treated by infectious disease and a podiatrist. He is currently in a wheelchair and states that they are planning to advance him to a walker (with a foot prosthetic) and then to a cane, but he was told that he will likely walk with a limp. The patient lives with his mother, but her health is not very good either. The patient is no longer able to drive due to the amputation. The patient's A1C has significantly improved from 10.9% last year this time to 8.7% in September 2020 and in January 2021 it was down to 7.8%. He states that his fasting glucose ranges 130-160. He did have an episode of hypoglycemia this morning, where his glucose was 50 he states. The patient eats cheerios for breakfast, is often not hungry for lunch. He was told in the hospital that he should drink an Ensure with every meal.     The patient states that since his amputation surgery, his depression has increased. Denies suicidal ideation. He states that he is not interested in medication for this right now. His previous employment has all been very physical jobs which he will no longer be able to do. He states that he has no computer skills, so he thinks it will be difficult for him to find a job now. REVIEW OF SYMPTOMS  Review of Systems   Constitutional: Negative for chills and fever. Respiratory: Negative for cough and shortness of breath. Gastrointestinal: Negative for diarrhea and vomiting.      PAST MEDICAL HISTORY  Past Medical History:   Diagnosis Date    Callus of foot Right foot - see's Dr. Ree Woodruff Diabetic ulcer of right midfoot associated with type 2 diabetes mellitus, with fat layer exposed ([facility] [address] 8/11/2020    Dizziness     positional    Essential hypertension     Follows with PCP    Lumbar radiculopathy        FAMILY HISTORY  Family History   Problem Relation Age of Onset    Heart Disease Father     Diabetes Father     Diabetes Mother        SOCIAL HISTORY  Social History     Socioeconomic History    Marital status: Single     Spouse name: Not on file    Number of children: Not on file    Years of education: Not on file    Highest education level: Not on file   Occupational History    Not on file   Social Needs    Financial resource strain: Not on file    Food insecurity     Worry: Not on file     Inability: Not on file    Transportation needs     Medical: Not on file     Non-medical: Not on file   Tobacco Use    Smoking status: Never Smoker    Smokeless tobacco: Never Used   Substance and Sexual Activity    Alcohol use: Yes     Comment: \"couple beers a night\"    Drug use: No    Sexual activity: Yes     Partners: Female   Lifestyle    Physical activity     Days per week: Not on file     Minutes per session: Not on file    Stress: Not on file   Relationships    Social connections     Talks on phone: Not on file     Gets together: Not on file     Attends Gnosticist service: Not on file     Active member of club or organization: Not on file     Attends meetings of clubs or organizations: Not on file     Relationship status: Not on file    Intimate partner violence     Fear of current or ex partner: Not on file     Emotionally abused: Not on file     Physically abused: Not on file     Forced sexual activity: Not on file   Other Topics Concern    Not on file   Social History Narrative    Not on file        SURGICAL HISTORY  Past Surgical History:   Procedure Laterality Date    ACHILLES TENDON SURGERY Right 1/8/2021    RIGHT ACHILLES TENDON LENGTHENING REPAIR performed by Norman Hunter DPM at [address]  03/2018    [facility]    DENTAL SURGERY      teeth extractions -half per patient    HERNIA REPAIR Bilateral 5/27/2020    BIALTERAL HERNIA INGUINAL REPAIR performed by Benjamin Hyman MD at [address] [place] [address]  2018    [place] OFFICE/OUTPT VISIT,PROCEDURE ONLY Left 4/17/2018    L5-S1 HEMILAMINECTOMY, REMOVAL OF DISC LEFT SIDE performed by Autumn Gibson MD at [address] Right 1/8/2021    RIGHT TRANSMETATARSAL TOE AMPUTATION performed by Norman Hunter DPM at [address] EXTRACTION         CURRENT MEDICATIONS  Current Outpatient Medications   Medication Sig Dispense Refill    glyBURIDE (DIABETA) 5 MG tablet Take 2 tablets by mouth 2 times daily (with meals) 360 tablet 1    lisinopril-hydroCHLOROthiazide (PRINZIDE;ZESTORETIC) 20-12.5 MG per tablet TAKE 1 TABLET BY MOUTH ONCE DAILY 90 tablet 1    metFORMIN (GLUCOPHAGE) 500 MG tablet Take 2 tablets by mouth 2 times daily (with meals) 360 tablet 1    omeprazole (PRILOSEC) 20 MG delayed release capsule TAKE 1 CAPSULE BY MOUTH ONCE DAILY IN THE MORNING 90 capsule 1    vancomycin (VANCOCIN) infusion Infuse 1,750 mg intravenously every 12 hours Compound per protocol. 334912 mg 0    pioglitazone (ACTOS) 15 MG tablet Take 1 tablet by mouth daily (Patient taking differently: Take 15 mg by mouth daily Pt states PRN) 30 tablet 5    blood glucose monitor kit and supplies Dispense sufficient amount for indicated testing frequency plus additional to accommodate PRN testing needs. Dispense all needed supplies to include: monitor, strips, lancing device, lancets, control solutions, alcohol swabs. 1 kit 0    blood glucose monitor strips Test 2 times a day & as needed for symptoms of irregular blood glucose. Dispense sufficient amount for indicated testing frequency plus additional to accommodate PRN testing needs.  100 strip 0    FreeStyle Lancets MISC 1 each by Does not apply route daily 100 each 3    aspirin 81 MG tablet Take 81 mg by mouth daily      acetaminophen (TYLENOL) 325 MG tablet Take 2 tablets by mouth every 4 hours as needed for Pain or Fever 120 tablet 3     No current facility-administered medications for this visit. ALLERGIES  No Known Allergies    RECENT LABS    Lab Results   Component Value Date    LABA1C 7.8 (H) 01/04/2021     Lab Results   Component Value Date     01/04/2021       Lab Results   Component Value Date    CHOL 142 03/12/2020    CHOL 117 06/25/2018    CHOL 125 02/22/2018     Lab Results   Component Value Date    LDLCALC 60 03/12/2020    LDLCALC 58 06/25/2018       Lab Results   Component Value Date    WBC 5.6 02/01/2021    HGB 12.5 (L) 02/01/2021    HCT 37.4 (L) 02/01/2021    MCV 92.3 02/01/2021     02/01/2021       PHYSICAL EXAM  /62   Pulse 102   Temp 96.8 °F (36 °C)   Ht 5' 11\" (1.803 m)   Wt 170 lb (77.1 kg)   SpO2 99%   BMI 23.71 kg/m²     Physical Exam  Constitutional:       Appearance: Normal appearance. HENT:      Head: Normocephalic and atraumatic. Eyes:      Comments: EOM grossly intact. Cardiovascular:      Rate and Rhythm: Normal rate and regular rhythm. Heart sounds: No murmur. No friction rub. No gallop. Pulmonary:      Effort: Pulmonary effort is normal.      Breath sounds: Normal breath sounds. No wheezing, rhonchi or rales. Musculoskeletal:      Comments: Patient is status post amputation of the right forefoot. Dressings are clean, dry, intact. Skin:     General: Skin is warm and dry. Neurological:      Mental Status: He is alert and oriented to person, place, and time. Comments: Cranial nerves II-XII grossly intact   Psychiatric:         Mood and Affect: Mood normal.         Behavior: Behavior normal.         ASSESSMENT & PLAN  1. Amputation of right foot, subsequent encounter [facility])  Placed referral for home care.  I believe that the patient will benefit from Occupational Therapy for Activities of Daily Living retraining and adaptive equipment training due to recent amputation (walker then cane as well as prosthetic). - Teaching and management of the following disease process and symptom management: Type 2 Diabetes Mellitus (especially dietary management)  - [facility]    2. Uncontrolled type 2 diabetes with cellulitis of foot (HCC)  A1C showed improvement. Next A1C will be due in 2 months. Placed referral for home care. I believe that the patient will benefit from Diabetes education, especially involving diet. Refilled medications. I instructed the patient to make sure he eats at least two good meals a day (recommended lean proteins like fish, chicken, turkey, nuts, beans, etc). I agree with the hospitalist's recommendation to drink Ensure daily. I explained that protein will be important to helping his body heal from the surgery. - glyBURIDE (DIABETA) 5 MG tablet; Take 2 tablets by mouth 2 times daily (with meals)  Dispense: 360 tablet; Refill: 1  - metFORMIN (GLUCOPHAGE) 500 MG tablet; Take 2 tablets by mouth 2 times daily (with meals)  Dispense: 360 tablet; Refill: 1  - [facility]    3. Essential hypertension  Well controlled. Refilled medications. - lisinopril-hydroCHLOROthiazide (PRINZIDE;ZESTORETIC) 20-12.5 MG per tablet; TAKE 1 TABLET BY MOUTH ONCE DAILY  Dispense: 90 tablet; Refill: 1    4. Gastroesophageal reflux disease without esophagitis  Well controlled. Refilled medication. - omeprazole (PRILOSEC) 20 MG delayed release capsule; TAKE 1 CAPSULE BY MOUTH ONCE DAILY IN THE MORNING  Dispense: 90 capsule; Refill: 1      Provider to Follow Referral: Tracy Dong PA-C (and supervising physician, Dr. Neila Mcburney, MD).      I certify that I, or a nurse practitioner or physician assistant working with me, had an in-person encounter with Yamilka Maria Isabel on 2/5/21 and the reason for the home care services is documented in the clinical note for that day. David Choudhary was assessed on the above date and was found to have medical conditions requiring Home Care that included amputation of right foot, uncontrolled type 2 diabetes. Homebound Status: further, I certify that my clinical findings support that David Choudhary does meet the Medicare definition of \"Confined to the Home\" because of   Postoperative weakness and/or pain restricting activity, and the patient is not able to drive due to right foot amputation. Certification and medical necessity: I certify that, based on my findings, the following services are medically necessary home health services for David Choudhary for the following reasons:  - Vital signs monitoring: Nurse to perform BP, HR, RR, Temp, and Weight with each visit and teach patient and caregivers on taking daily. Nurse to call physician if pulse less than 50 or greater than 120, respiratory rate less than 12 or greater than 25, oral temperature greater than or equal to 002 oF, systolic BP less than 90 or greater than 330, diastolic BP less than 50 or greater than 100.  - Consult Occupational Therapy for  Activities of Daily Living retraining and adaptive equipment training due to weakness and recent loss of function  - Teaching and management of the following disease process and symptom management: Type 2 Diabetes Mellitus (especially dietary management)    Return in about 3 months (around 5/5/2021).             Electronically signed by Arcenio Mera PA-C on 2/5/2021

## 2021-02-08 ENCOUNTER — HOSPITAL ENCOUNTER (OUTPATIENT)
Dept: HYPERBARIC MEDICINE | Age: 41
Discharge: HOME | End: 2021-02-08
Payer: COMMERCIAL

## 2021-02-08 ENCOUNTER — HOSPITAL ENCOUNTER (OUTPATIENT)
Age: 41
Setting detail: SPECIMEN
Discharge: HOME | End: 2021-02-08
Payer: COMMERCIAL

## 2021-02-08 VITALS
RESPIRATION RATE: 20 BRPM | TEMPERATURE: 97.6 F | DIASTOLIC BLOOD PRESSURE: 92 MMHG | SYSTOLIC BLOOD PRESSURE: 138 MMHG | HEART RATE: 87 BPM

## 2021-02-08 DIAGNOSIS — E11.621 DIABETIC ULCER OF RIGHT MIDFOOT ASSOCIATED WITH TYPE 2 DIABETES MELLITUS, WITH NECROSIS OF BONE (HCC): Primary | ICD-10-CM

## 2021-02-08 DIAGNOSIS — L97.414 DIABETIC ULCER OF RIGHT MIDFOOT ASSOCIATED WITH TYPE 2 DIABETES MELLITUS, WITH NECROSIS OF BONE (HCC): Primary | ICD-10-CM

## 2021-02-08 LAB
ALBUMIN SERPL-MCNC: 4.4 GM/DL (ref 3.4–5)
ALP BLD-CCNC: 49 IU/L (ref 40–128)
ALT SERPL-CCNC: 25 U/L (ref 10–40)
ANION GAP SERPL CALCULATED.3IONS-SCNC: 11 MMOL/L (ref 4–16)
AST SERPL-CCNC: 19 IU/L (ref 15–37)
BASOPHILS ABSOLUTE: 0 K/CU MM
BASOPHILS RELATIVE PERCENT: 0.2 % (ref 0–1)
BILIRUB SERPL-MCNC: 0.4 MG/DL (ref 0–1)
BUN BLDV-MCNC: 10 MG/DL (ref 6–23)
C-REACTIVE PROTEIN, HIGH SENSITIVITY: 4.4 MG/L
CALCIUM SERPL-MCNC: 9 MG/DL (ref 8.3–10.6)
CHLORIDE BLD-SCNC: 98 MMOL/L (ref 99–110)
CO2: 27 MMOL/L (ref 21–32)
CREAT SERPL-MCNC: 0.9 MG/DL (ref 0.9–1.3)
DIFFERENTIAL TYPE: ABNORMAL
DOSE AMOUNT: ABNORMAL
DOSE TIME: ABNORMAL
EOSINOPHILS ABSOLUTE: 0.2 K/CU MM
EOSINOPHILS RELATIVE PERCENT: 2.9 % (ref 0–3)
ERYTHROCYTE SEDIMENTATION RATE: 47 MM/HR (ref 0–15)
GFR AFRICAN AMERICAN: >60 ML/MIN/1.73M2
GFR NON-AFRICAN AMERICAN: >60 ML/MIN/1.73M2
GLUCOSE BLD-MCNC: 149 MG/DL (ref 70–99)
GLUCOSE BLD-MCNC: 149 MG/DL (ref 70–99)
GLUCOSE BLD-MCNC: 171 MG/DL (ref 70–99)
HCT VFR BLD CALC: 37.8 % (ref 42–52)
HEMOGLOBIN: 12.7 GM/DL (ref 13.5–18)
IMMATURE NEUTROPHIL %: 0 % (ref 0–0.43)
LYMPHOCYTES ABSOLUTE: 2.1 K/CU MM
LYMPHOCYTES RELATIVE PERCENT: 34.7 % (ref 24–44)
MCH RBC QN AUTO: 30.7 PG (ref 27–31)
MCHC RBC AUTO-ENTMCNC: 33.6 % (ref 32–36)
MCV RBC AUTO: 91.3 FL (ref 78–100)
MONOCYTES ABSOLUTE: 0.3 K/CU MM
MONOCYTES RELATIVE PERCENT: 5.1 % (ref 0–4)
NUCLEATED RBC %: 0 %
PDW BLD-RTO: 12.8 % (ref 11.7–14.9)
PLATELET # BLD: 363 K/CU MM (ref 140–440)
PMV BLD AUTO: 9.9 FL (ref 7.5–11.1)
POTASSIUM SERPL-SCNC: 4.3 MMOL/L (ref 3.5–5.1)
RBC # BLD: 4.14 M/CU MM (ref 4.6–6.2)
SEGMENTED NEUTROPHILS ABSOLUTE COUNT: 3.4 K/CU MM
SEGMENTED NEUTROPHILS RELATIVE PERCENT: 57.1 % (ref 36–66)
SODIUM BLD-SCNC: 136 MMOL/L (ref 135–145)
TOTAL IMMATURE NEUTOROPHIL: 0 K/CU MM
TOTAL NUCLEATED RBC: 0 K/CU MM
TOTAL PROTEIN: 7.8 GM/DL (ref 6.4–8.2)
VANCOMYCIN TROUGH: 23.6 UG/ML (ref 10–20)
WBC # BLD: 5.9 K/CU MM (ref 4–10.5)

## 2021-02-08 PROCEDURE — 80202 ASSAY OF VANCOMYCIN: CPT

## 2021-02-08 PROCEDURE — 82962 GLUCOSE BLOOD TEST: CPT

## 2021-02-08 PROCEDURE — 86140 C-REACTIVE PROTEIN: CPT

## 2021-02-08 PROCEDURE — 85652 RBC SED RATE AUTOMATED: CPT

## 2021-02-08 PROCEDURE — 80053 COMPREHEN METABOLIC PANEL: CPT

## 2021-02-08 PROCEDURE — 99183 HYPERBARIC OXYGEN THERAPY: CPT | Performed by: NURSE PRACTITIONER

## 2021-02-08 PROCEDURE — 85025 COMPLETE CBC W/AUTO DIFF WBC: CPT

## 2021-02-08 PROCEDURE — G0277 HBOT, FULL BODY CHAMBER, 30M: HCPCS

## 2021-02-08 NOTE — PROGRESS NOTES
This SN called [facility] on Thursday 1/28/21 to verify coverage and benefits for HBO. Spoke with  Rep.- David Bajwa, Pt. has an effective date of 4/1/2020- current . Asked about CPT codes 04643 - covered and only requires prior authorization if provider is non participating. - not on their fee schedule , therefore requires prior authorization. Per David Bajwa unable to check diagnosis codes. Ref# [id number] for this inquiry. SN called back to [facility] this same day; spoke with Rep . Machelle Chairez to confirm the above information; was advised of the same. Ref# [id number]. SN called [facility] on 2/3/21 to follow up on Prior Authorization request for HBO submitted; Spoke with Rep.-Roseanne . Reported request had not been reviewed as of yet; \"review nurses are \"running behind and currently doing request submitted week of 1/21/21; if patient needs started urgently advised resubmitting Auth.request. as an urgent request.\"Ref. # [id number]. SN  Spoke with Jaime Graff CNP  ask if request should be processed as urgent ; Jaime Graff signed form, SN resubmitted request as Urgent. Received a fax from [facility] on 2/4/21 that reads OP services do not require prior authorization as long as facility is participating. SN called patient and informed of this.

## 2021-02-08 NOTE — PROGRESS NOTES
present on these premises and immediately available to furnish assistance & direction throughout the procedure. Plan          David Choudhary is a 36 y.o. male  did successfully complete today's hyperbaric oxygen treatment at [address] [place] [address]. In my clinical judgement, ongoing HBO therapy is  necessary at this time, given a threat to patient function, limb or life from the current condition. Supervision and attendance of Hyperbaric Oxygen Therapy provided. Continue HBO treatment as outlined in the treatment plan. Hyperbaric Oxygen: David Choudhary tolerated Treatment Number: 1 well today without complications.      Electronically signed by RAFAEL Singh CNP on 2/8/2021 at 4:25 PM

## 2021-02-08 NOTE — PROGRESS NOTES
Hyperbaric Oxygen Therapy   Patient Orientation      NAME: Venkat Bates  MEDICAL RECORD NUMBER:  [id number]  AGE: 36 y.o. GENDER: male  : 1980  EPISODE DATE:  2021    HBO Orientation Completed with:  Patient         INTRODUCTION   Welcome to the [facility],[address]. This guide will assist in answering any questions which you might have concerning your scheduled hyperbaric oxygen treatment and if appropriate, any wound care you might need. During your stay with us you will hear your treatment called a dive. This is just a nickname given to the procedure and does not refer to being in water. You will only be exposed to air pressure changes during your treatments. HYPERBARIC OXYGEN THERAPY  Hyperbaric oxygen treatment is a means of providing additional oxygen to your body tissues. By increasing the oxygen in the tissues, healing is enhanced. Two important effects on difficult wounds are first, to speed new microscopic blood vessel growth into the wound and second, to improve the ability of your white blood cells to kill germs. It is important to know that hyperbaric oxygen is an additional (adjunctive) therapy in addition to the current medical or surgical care you are receiving. It is also essential that you understand that this is not a cure-all but a part of your total medical care. THE STAFF  [facility] staff consists of fully trained physicians, nursing staff, and chamber operators. There will always be a physician, as well as other staff members with you in the department while you are having your hyperbaric oxygen therapy treatment. Please feel free to ask for help from any of the staff members while you are here. THE CHAMBER  The hyperbaric chamber located at the [address] [place] [address] Road is a monoplace, seamless acrylic pressure chamber designed to treat one patient at a time. You will be lying down with your head slightly elevated for your treatments.   A communication system allows you to speak with the , family members or the physician. You will also be able to watch and listen to television during treatment. The monoplace chamber administers 100% oxygen at a physician prescribed pressure. Because you will be in an oxygen environment, a detailed list of safety precautions and guidelines will be strictly enforced for your safety. TREATMENT SCHEDULE  You will need to arrange for your own transportation. If there is difficulty, we will try to assist in making the necessary arrangements with an appropriate agency. Hyperbaric treatments are given daily, Monday through Friday. Each treatment will last almost two (2) hours. Be prepared to spend approximately three (3) hours at our facility. This allows time for preparing you for your treatment dive and the actual time in the chamber. It is essential that you try to make every scheduled treatment dive possible so that the effects of the hyperbaric oxygen will continue. Any long interruption could cause the healing enhancement to slow or even stop. If at any time you have chills, fever, nausea, vomiting, diarrhea or any problem with your glucose levels, please inform the physician or the nurse. You will be assessed to see if you should be in the chamber that day. You will receive a complete briefing by a staff member. Necessary forms and informed consents (permits) will need to be signed before treatments begin. You will also be given a tour of the area . VISITORS  Visitors are welcome and we encourage patients to bring their families. We ask that once your family has seen the facility that they remain in the waiting room to ensure your privacy, and the privacy of the other patients. Visitors are not allowed in the treatment area unless their presence is needed by the physician. Again, we welcome you to our facility.   Please let us know if there is anything that we can do to assist you during your time with us. GENERAL INFORMATION REVIEWED: Yes      You will need to arrive 30 minutes prior to your visit. Please call us if you are not feeling well the day of your visit, so that we can determine if it will be necessary to reschedule your treatment (before you arrive). Each visit will begin with a staff member asking you a series of questions. These questions may sound repetitive, but please understand, for safety reasons, that we must ask the same questions each and every visit. Only chamber approved clothing may be worn during treatment, therefore special clothing is provided for you at each visit. For safety reasons, chamber operators are required to ask about prohibited items each and every visit. Vital signs (blood pressure, temperature pulse, and respirations) will be taken both before and after each visit. PRESSURE CHANGES INFORMATION REVIEWED: Yes   As the pressure within the chamber changes, you may notice changes to your ears, sinuses or lungs. For example, your ears may \"pop\" as if you are traveling on an airplane or scuba diving. Please notify the chamber  if you are experiencing pain in your ears, sinuses or lungs during your treatment \"dive. \"      To help prevent pain or injury to your ears, you will be taught by the staff and will practice thoroughly a procedure, known as the Valsalva maneuver, as well as other techniques prior to your first treatment \"dive. \"  Although the pressure in the chamber is not felt on the body, you do feel changes in the ears. The eardrum is normally flat while you are at ground level. Pressure changes in the atmosphere around you will usually be felt in the ears. The eardrum tends to bow inwardly and unless you take positive action, fullness or pain will be felt.   In order to avoid this, you will be taught how to force air into the middle ear during the few minutes that it takes to pressurize the chamber. To help prevent pain or injury to your lungs, please make sure to notify a staff member if you have any upper respiratory infection and/or congestion. It is very important not to hold your breath during your treatment \"dive\", just breath normally. PATIENTS WITH DIABETES ONLY Yes  If you have diabetes your blood sugar level will be tested before each and every treatment. If your blood sugar level is too low, we will attempt to help you raise it by providing a diabetic nutritional shake. We will retest your blood sugar shortly thereafter. If your blood sugar level is still low, we may not be able to provide a treatment \"dive\" that day. If your blood sugar levels is too high, we also may not be able to provide a treatment \"dive\" that day. If your blood sugar level continues to be too high or too low, not allowing you to continue with the hyperbaric treatments, you will need to contact your primary physician to help manage your blood sugar more effectively. ABOUT YOUR TREATMENT INFORMATION REVIEWED: Yes   If you are feeling nervous or anxious about these treatments, please let a staff member know. We are here to help you. Before each and every treatment, please let us know of any changes regarding:    your medication, especially cancer medication  any possibility of being pregnant  any new implants or devices    Temporary vision changes may occur during hyperbaric oxygen therapy. Therefore, it is recommended that you not change eyeglass prescriptions during therapy. Changes that occur during therapy should return to pre-treatment baseline within several weeks of the conclusion of therapy.  In the rare instance that vision has not returned to the pre-treatment baseline; it is recommended that you schedule an eye exam with your Opthalmalogist.    It is very important to make the clinical staff aware if you have ever had a collapsed lung    ITEMS TO AVOID INFORMATION REVIEWED: Yes Smoking has a negative effect on treatment and may diminish the benefits you may receive. Smoking within 2 hours prior to your treatment poses additional risk and should be avoided completely. Drinking alcohol or using illicit drugs may also have a negative effect on your treatment and should be avoided. Drinking carbonated beverages such as soda pop within 1 hour of your treatment could cause pains in the stomach during the treatment. Caffeinated beverages or foods containing caffeine should be avoided before your treatment. Please let us know if we can assist you with seeking help to stop smoking, drinking or using recreational drugs. PROHIBITED ITEMS INFORMATION REVIEWED: Yes   No wigs, hair spray, hair oils, hair ornaments, creams, lotions, make-up, after shave, perfumes, colognes, chap stick, lip balms, mustache waxes, petroleum products (e.g. Vaseline), baby oil, deodorant or ointments  No nail polish    No synthetic clothing  No nylon hose, underwear, panty hose or bra  No food, gum or candy  No jewelry  No smoking materials such as lighter, cigarettes or matches  No reading material  No hearing aids, non-fixed dentures  No Hard (non-gas permeable) contact lenses  Most dressings are approved to wear into the hyperbaric chamber. Please advise the hyperbaric staff of any new dressings applied to your wound to ensure the new dressings are compatible with hyperbaric oxygen treatments. No alcohol preps  No heat packs  No street clothes or shoes  No cell phones or other electronics  If it was not a part of you when you were born into this world, if it was not provided by the chamber , then it cannot go into the chamber with you.         Electronically signed by Jonnie Cazares LPN on 5/1/8803 at 1:64 AM

## 2021-02-09 ENCOUNTER — HOSPITAL ENCOUNTER (OUTPATIENT)
Dept: HYPERBARIC MEDICINE | Age: 41
Discharge: HOME | End: 2021-02-09
Payer: COMMERCIAL

## 2021-02-09 ENCOUNTER — TELEPHONE (OUTPATIENT)
Dept: INFECTIOUS DISEASES | Age: 41
End: 2021-02-09

## 2021-02-09 VITALS
HEART RATE: 90 BPM | SYSTOLIC BLOOD PRESSURE: 133 MMHG | DIASTOLIC BLOOD PRESSURE: 89 MMHG | TEMPERATURE: 97.4 F | RESPIRATION RATE: 20 BRPM

## 2021-02-09 DIAGNOSIS — L97.414 DIABETIC ULCER OF RIGHT MIDFOOT ASSOCIATED WITH TYPE 2 DIABETES MELLITUS, WITH NECROSIS OF BONE (HCC): Primary | ICD-10-CM

## 2021-02-09 DIAGNOSIS — E11.621 DIABETIC ULCER OF RIGHT MIDFOOT ASSOCIATED WITH TYPE 2 DIABETES MELLITUS, WITH NECROSIS OF BONE (HCC): Primary | ICD-10-CM

## 2021-02-09 LAB
GLUCOSE BLD-MCNC: 114 MG/DL (ref 70–99)
GLUCOSE BLD-MCNC: 202 MG/DL (ref 70–99)

## 2021-02-09 PROCEDURE — G0277 HBOT, FULL BODY CHAMBER, 30M: HCPCS

## 2021-02-09 PROCEDURE — 82962 GLUCOSE BLOOD TEST: CPT

## 2021-02-09 PROCEDURE — 99183 HYPERBARIC OXYGEN THERAPY: CPT | Performed by: NURSE PRACTITIONER

## 2021-02-09 NOTE — TELEPHONE ENCOUNTER
Called from [facility] states pts vanco trough was 23.6 and it has usually been 17.2. Did you want to hold the dose and redraw labs before adjusting the dose? Please advise.

## 2021-02-09 NOTE — PROGRESS NOTES
[facility]  Hyperbaric Oxygen Therapy   Progress Note      NAME: [address] [place] [address] RECORD NUMBER:  [id number]  AGE: 36 y.o. GENDER: male  : 1980  EPISODE DATE:  2021     Subjective     HBO Treatment Number: 2 out of Total Treatments: 30    HBO Diagnosis:     Indications: Lower Extremity Diabetic Wound ___(site)(right foot)  Brett Klein Outlaw 3     Safety checks performed prior to treatment. See doc flowsheets for documentation. Objective           Recent Labs     21  1425 21  1646   POCGLU 202* 114*       Pre treatment Vital Signs       Temp: 97.8 °F (36.6 °C)     Pulse: 103     Resp: 16     BP: 126/81     Blood Sugar 202    Post treatment Vital Signs  Temp: 97.4 °F (36.3 °C)  Pulse: 90  Resp: 20  BP: 133/89  Blood Sugar 114    Assessment        Physical Exam:  General Appearance:  alert and oriented to person, place and time, well-developed and well-nourished, in no acute distress    Pre Tympanic Membrane Assessment:  tympanic membranes intact bilaterally    Post Tympanic Membrane Assessment:  Right: Normal(per Pt.)  Left: Normal(corrected time-per Pt.)    Pulmonary/Chest:  clear to auscultation bilaterally- no wheezes, rales or rhonchi, normal air movement, no respiratory distress    Cardiovascular:  normal, regular rate and rhythm    Chamber #: [id number]       Treatment Start Time: 1439     Pressure Reached Time: 1454  WALESKA : 2  Number of Air Breaks:  Treatment Status: Other (Comment)(Pt. used air during compression ,once during the Tx. due to anxiety; Provider aware that Pt. uses air to help with anxiety. Pt.also used air several minutes towards the end of the tx. when he became anxious from not seeing tech. helping another Pt.)      Decompression Time: 1624   Treatment End Time: 4498         Adverse Event: no      Total time in chamber:118  Minutes at depth: 90    I was present on these premises and immediately available to furnish assistance & direction throughout the procedure. Plan          Sai Rosas is a 36 y.o. male  did successfully complete today's hyperbaric oxygen treatment at [address] [place] [address]. In my clinical judgement, ongoing HBO therapy is  necessary at this time, given a threat to patient function, limb or life from the current condition. Supervision and attendance of Hyperbaric Oxygen Therapy provided. Continue HBO treatment as outlined in the treatment plan. Hyperbaric Oxygen: Sai Rosas tolerated Treatment Number: 2 well today without complications.      Electronically signed by RAFAEL Powell CNP on 2/9/2021 at 5:34 PM

## 2021-02-10 ENCOUNTER — HOSPITAL ENCOUNTER (OUTPATIENT)
Dept: HYPERBARIC MEDICINE | Age: 41
Discharge: HOME | End: 2021-02-10
Payer: COMMERCIAL

## 2021-02-10 VITALS
HEART RATE: 96 BPM | TEMPERATURE: 98 F | RESPIRATION RATE: 24 BRPM | SYSTOLIC BLOOD PRESSURE: 120 MMHG | DIASTOLIC BLOOD PRESSURE: 84 MMHG

## 2021-02-10 DIAGNOSIS — L97.414 DIABETIC ULCER OF RIGHT MIDFOOT ASSOCIATED WITH TYPE 2 DIABETES MELLITUS, WITH NECROSIS OF BONE (HCC): Primary | ICD-10-CM

## 2021-02-10 DIAGNOSIS — E11.621 DIABETIC ULCER OF RIGHT MIDFOOT ASSOCIATED WITH TYPE 2 DIABETES MELLITUS, WITH NECROSIS OF BONE (HCC): Primary | ICD-10-CM

## 2021-02-10 LAB
GLUCOSE BLD-MCNC: 114 MG/DL (ref 70–99)
GLUCOSE BLD-MCNC: 202 MG/DL (ref 70–99)

## 2021-02-10 PROCEDURE — 82962 GLUCOSE BLOOD TEST: CPT

## 2021-02-10 PROCEDURE — G0277 HBOT, FULL BODY CHAMBER, 30M: HCPCS

## 2021-02-10 RX ORDER — LORAZEPAM 1 MG/1
1 TABLET ORAL EVERY 8 HOURS PRN
Qty: 20 TABLET | Refills: 0 | Status: ON HOLD | OUTPATIENT
Start: 2021-02-10 | End: 2021-03-13 | Stop reason: HOSPADM

## 2021-02-10 NOTE — PROGRESS NOTES
[address] [facility],[address] [facility]  Hyperbaric Oxygen Therapy   Progress Note      NAME: [address] [place] [address] RECORD NUMBER:  [id number]  AGE: 36 y.o. GENDER: male  : 1980  EPISODE DATE:  2/10/2021     Subjective     HBO Treatment Number: 3 out of Total Treatments: 30    HBO Diagnosis:     Indications: Lower Extremity Diabetic Wound ___(site)(right foot)  Dixie Becker Axon 3     Safety checks performed prior to treatment. See doc flowsheets for documentation. Objective           Recent Labs     02/10/21  0840 02/10/21  1049   POCGLU 202* 114*       Pre treatment Vital Signs       Temp: 97.5 °F (36.4 °C)     Pulse: 108     Resp: 24     BP: 126/83     Blood Sugar 202    Post treatment Vital Signs  Temp: 98 °F (36.7 °C)  Pulse: 96  Resp: 24  BP: 120/84  Blood Sugar 114    Assessment        Physical Exam:  General Appearance:  alert and oriented to person, place and time, well-developed and well-nourished, in no acute distress    Pre Tympanic Membrane Assessment:  tympanic membranes intact bilaterally, normal color, normal light reflex bilaterally    Post Tympanic Membrane Assessment:  Right: (Pt. reported ear hurt immediately after Tx.from popping then reported pain gone at d/c)  Left: (Pt. reported ear hurt immediately after Tx. from popping then reported pain gone at d/c)    Pulmonary/Chest:  clear to auscultation bilaterally- no wheezes, rales or rhonchi, normal air movement, no respiratory distress    Cardiovascular:  regular rate and rhythm, no murmurs rubs or gallops    Chamber #: [id number]       Treatment Start Time: 0859     Pressure Reached Time: 0913  WALESKA : 2  Number of Air Breaks:  Treatment Status: Other (Comment)(Pt. used air mask some during compression, once during the treatment & at the end of the treatment due to claustrophobia;SN coached patient at times to try to help with relaxing to attempt to help Pt. get through the Tx.)      Decompression Time: 1033(Treatment aborted per Pt. request  due to claustrophobia)   Treatment End Time: 1043         TOTAL TIME AT [address] [place] [address] Drive    Adverse Event: no      I was present on these premises and immediately available to furnish assistance & direction throughout the procedure. Plan          Venkat Bates is a 36 y.o. male  did successfully complete today's hyperbaric oxygen treatment at [address] [place] [address] [employer]. In my clinical judgement, ongoing HBO therapy is  necessary at this time, given a threat to patient function, limb or life from the current condition. Supervision and attendance of Hyperbaric Oxygen Therapy provided. Continue HBO treatment as outlined in the treatment plan. Hyperbaric Oxygen: Venkat Bates tolerated Treatment Number: 3 well today without complications.      Electronically signed by Charity Nettles MD on 2/10/2021 at 12:36 PM

## 2021-02-11 ENCOUNTER — HOSPITAL ENCOUNTER (OUTPATIENT)
Dept: HYPERBARIC MEDICINE | Age: 41
Discharge: HOME | End: 2021-02-11
Payer: COMMERCIAL

## 2021-02-11 ENCOUNTER — HOSPITAL ENCOUNTER (OUTPATIENT)
Age: 41
Setting detail: SPECIMEN
Discharge: HOME | End: 2021-02-11
Payer: COMMERCIAL

## 2021-02-11 VITALS
SYSTOLIC BLOOD PRESSURE: 120 MMHG | DIASTOLIC BLOOD PRESSURE: 89 MMHG | RESPIRATION RATE: 24 BRPM | TEMPERATURE: 97.3 F | HEART RATE: 98 BPM

## 2021-02-11 DIAGNOSIS — L97.414 DIABETIC ULCER OF RIGHT MIDFOOT ASSOCIATED WITH TYPE 2 DIABETES MELLITUS, WITH NECROSIS OF BONE (HCC): Primary | ICD-10-CM

## 2021-02-11 DIAGNOSIS — E11.621 DIABETIC ULCER OF RIGHT MIDFOOT ASSOCIATED WITH TYPE 2 DIABETES MELLITUS, WITH NECROSIS OF BONE (HCC): Primary | ICD-10-CM

## 2021-02-11 LAB
DOSE AMOUNT: ABNORMAL
DOSE TIME: ABNORMAL
GLUCOSE BLD-MCNC: 151 MG/DL (ref 70–99)
GLUCOSE BLD-MCNC: 169 MG/DL (ref 70–99)
VANCOMYCIN TROUGH: 4 UG/ML (ref 10–20)

## 2021-02-11 PROCEDURE — G0277 HBOT, FULL BODY CHAMBER, 30M: HCPCS

## 2021-02-11 PROCEDURE — 80202 ASSAY OF VANCOMYCIN: CPT

## 2021-02-11 PROCEDURE — 82962 GLUCOSE BLOOD TEST: CPT

## 2021-02-11 NOTE — PROGRESS NOTES
Discharge Instructions for  Hyperbaric Oxygen Therapy  [address] [place] [address]  [address], [address]  Telephone number [phone]      You have been treated with 100% oxygen in the Hyperbaric Chamber at the [address] [place] [address]. There are usually no adverse effects or problems which follow this treatment. However, for comfort and safety, we strongly recommend these guidelines are followed:     It is perfectly normal to feel tired after a hyperbaric treatment. This tiredness should go away 2 to 3 days after your last treatment.  Avoid heavy exertion, exercise or other unnecessary activity if you are feeling tired.  Some people develop a feeling of fullness or stuffiness in their ears. This is a result of a small amount of fluid build-up in the middle ear. You may take an over the counter decongestant if approved by your doctor. Follow the instructions in the medicine package for the amount to take. If this problem lasts for more than 48 hours, please call your personal doctor. You also may call or return to the [address] [place] [address] and have a Hyperbaric doctor examine you.  In rare cases, patients may have so called late effects after the treatments. Please call the [address] [place] [address] Road if you have any of these symptoms:    [x]Headache that is not relieved by over the counter pain medicine. [x]Changes in vision. During the course of many hyperbaric treatments (20 or more) some patients may complain of a temporary problem with sharp focus on distant objects. This is a result of changes in the shape of the lens. Your vision should return to normal in 6 to 8 weeks. [x]Nausea or vomiting. .  [x]Clumsiness, difficulty walking or numbness and tingling of your arms and legs. [x]If you are on prescription medicines from your personal doctor, you may continue to take these as directed. [facility] Information:     If you have questions or develop any of the symptoms mentioned, please contact the Jose Izquierdo at [address] [place] [address] Place 8:00 am - 5:00 pm. If you need help outside these hours and cannot wait until we are again available, contact your PCP or go to the hospital emergency room. Patient Signature:_______________________Date:_________Time:________    [] Patient unable to sign Discharge Instructions given to ECF/Transportation/POA    The information contained in the After Visit Summary has been reviewed with me, the patient and/or responsible adult, by my health care provider(s). I had the opportunity to ask questions regarding this information.   I have elected to receive  [x]  After Visit Summary        Nurse Signature:_______________________Date:_________Time:_________    Electronically signed by Cristal Tapia LPN on 5/93/3359 at 11:35 AM

## 2021-02-11 NOTE — PROGRESS NOTES
[facility],4Th Floor  Hyperbaric Oxygen Therapy   Progress Note      NAME: [address] [place] [address] RECORD NUMBER:  [id number]  AGE: 36 y.o. GENDER: male  : 1980  EPISODE DATE:  2021     Subjective     HBO Treatment Number: 4 out of Total Treatments: 30    HBO Diagnosis:     Indications: Lower Extremity Diabetic Wound ___(site)(right foot)  Brea Rico 3     Safety checks performed prior to treatment. See doc flowsheets for documentation. Objective           Recent Labs     21  0856 21  1104   POCGLU 169* 151*       Pre treatment Vital Signs       Temp: 97.9 °F (36.6 °C)     Pulse: 111     Resp: 20     BP: 125/84     Blood Sugar 169    Post treatment Vital Signs  Temp: 97.3 °F (36.3 °C)  Pulse: 98  Resp: 24  BP: 120/89  Blood Sugar 151    Assessment        Physical Exam:  General Appearance:  alert and oriented to person, place and time, well-developed and well-nourished, in no acute distress and alert and oriented to person, place and time    Pre Tympanic Membrane Assessment:  tympanic membranes intact bilaterally, normal color    Post Tympanic Membrane Assessment:  Right: (Reports ear was hurting right after Tx., better at d/c)  Left: (Reports ear was hurting right after Tx., better at d/c)    Pulmonary/Chest:  clear to auscultation bilaterally- no wheezes, rales or rhonchi, normal air movement, no respiratory distress and no chest wall tenderness    Cardiovascular:  normal, regular rate and rhythm    Chamber #: [id number]       Treatment Start Time: 0903(Corrected start time. Dr. Haydee Haq informed Pt. uses Air mask during compression if needed due to anxiety and during the treatment @ times  to help him get through)     Pressure Reached Time: 0917  WALESKA : 2  Number of Air Breaks:  Treatment Status: Other (Comment)(Pt. used air mask again today early on during compression, but then took it off before getting to depth.  Used air mask @ times during the treatment to help relax)      Decompression Time: 1047   Treatment End Time: 1058         TOTAL TIME AT DEPTH-90    TOTAL TIME IN CHAMBER-115    Adverse Event: no      I was present on these premises and immediately available to furnish assistance & direction throughout the procedure. Plan          Ruddy Sifuentes is a 36 y.o. male  did successfully complete today's hyperbaric oxygen treatment at [address] [place] [address]. In my clinical judgement, ongoing HBO therapy is  necessary at this time, given a threat to patient function, limb or life from the current condition. Supervision and attendance of Hyperbaric Oxygen Therapy provided. Continue HBO treatment as outlined in the treatment plan. Hyperbaric Oxygen: Ruddy Sifuentes tolerated Treatment Number: 4 well today without complications.      Electronically signed by Hina Ortega MD on 2/11/2021 at 11:25 AM

## 2021-02-12 ENCOUNTER — HOSPITAL ENCOUNTER (OUTPATIENT)
Dept: HYPERBARIC MEDICINE | Age: 41
Discharge: HOME | End: 2021-02-12
Payer: COMMERCIAL

## 2021-02-12 VITALS
TEMPERATURE: 97.4 F | DIASTOLIC BLOOD PRESSURE: 84 MMHG | RESPIRATION RATE: 20 BRPM | HEART RATE: 86 BPM | SYSTOLIC BLOOD PRESSURE: 130 MMHG

## 2021-02-12 DIAGNOSIS — E11.621 DIABETIC ULCER OF RIGHT MIDFOOT ASSOCIATED WITH TYPE 2 DIABETES MELLITUS, WITH NECROSIS OF BONE (HCC): Primary | ICD-10-CM

## 2021-02-12 DIAGNOSIS — L97.414 DIABETIC ULCER OF RIGHT MIDFOOT ASSOCIATED WITH TYPE 2 DIABETES MELLITUS, WITH NECROSIS OF BONE (HCC): Primary | ICD-10-CM

## 2021-02-12 LAB
GLUCOSE BLD-MCNC: 148 MG/DL (ref 70–99)
GLUCOSE BLD-MCNC: 181 MG/DL (ref 70–99)

## 2021-02-12 PROCEDURE — 82962 GLUCOSE BLOOD TEST: CPT

## 2021-02-12 PROCEDURE — G0277 HBOT, FULL BODY CHAMBER, 30M: HCPCS

## 2021-02-12 NOTE — PROGRESS NOTES
[facility] [place]  Hyperbaric Oxygen Therapy   Progress Note      NAME: [address] [place] [address] Road RECORD NUMBER:  [id number]  AGE: 36 y.o. GENDER: male  : 1980  EPISODE DATE:  2021     Subjective     HBO Treatment Number: 5 out of Total Treatments: 30    HBO Diagnosis:     Indications: Lower Extremity Diabetic Wound ___(site)(Right foot)  Gayle MannheimMitcheal Levers 3     Safety checks performed prior to treatment. See doc flowsheets for documentation. Objective           Recent Labs     21  0845 21  1101   POCGLU 181* 148*       Pre treatment Vital Signs       Temp: 97.7 °F (36.5 °C)     Pulse: 102     Resp: 20     BP: 117/81     Blood Sugar 181    Post treatment Vital Signs  Temp: 97.4 °F (36.3 °C)  Pulse: 86  Resp: 20  BP: 130/84  Blood Sugar 148    Assessment        Physical Exam:  General Appearance:  alert and oriented to person, place and time, well-developed and well-nourished, in no acute distress    Pre Tympanic Membrane Assessment:  tympanic membranes intact bilaterally    Post Tympanic Membrane Assessment:  Right: Normal(per Pt.)  Left: Normal(per Pt.)    Pulmonary/Chest:  clear to auscultation bilaterally- no wheezes, rales or rhonchi, normal air movement, no respiratory distress    Cardiovascular:  normal, regular rate and rhythm    Chamber #: [id number]       Treatment Start Time: 7480     Pressure Reached Time: 0908  WALESKA : 2  Number of Air Breaks:  Treatment Status: Other (Comment)(Pt. did not use air during compression today,however became anxious x 2 during the treatment,air mask on x 2(4 min.& 7 min.)SERENA Murphy aware of Pt. using air short term PRN)      Decompression Time: 1038   Treatment End Time: 1053    Symptoms Noted During Treatment: Other (Comment)(See above. Pt. took antianxiety Med today. Instruct. to bring Monday to discuss possible slight dose increase with SERENA Tejaad)    Adverse Event: no      I was present on these premises and immediately available to furnish assistance & direction throughout the procedure. Plan          Royal Pickard is a 36 y.o. male  did successfully complete today's hyperbaric oxygen treatment at [address] [place] [address]. In my clinical judgement, ongoing HBO therapy is necessary at this time, given a threat to patient function, limb or life from the current condition. Supervision and attendance of Hyperbaric Oxygen Therapy provided. Continue HBO treatment as outlined in the treatment plan. Hyperbaric Oxygen: [place] tolerated Treatment Number: 5 well today without complications.      Electronically signed by RAFAEL Gonzales CNP on 2/12/2021 at 12:02 PM

## 2021-02-12 NOTE — TELEPHONE ENCOUNTER
Kasandra Sifuentes, from [facility] needs instructions on what to do with pt if a new order is not put in for mediation. Please advise.

## 2021-02-13 ENCOUNTER — HOSPITAL ENCOUNTER (EMERGENCY)
Age: 41
Discharge: HOME | End: 2021-02-13
Attending: EMERGENCY MEDICINE
Payer: COMMERCIAL

## 2021-02-13 VITALS
BODY MASS INDEX: 23.03 KG/M2 | HEIGHT: 72 IN | RESPIRATION RATE: 18 BRPM | SYSTOLIC BLOOD PRESSURE: 114 MMHG | WEIGHT: 170 LBS | TEMPERATURE: 98.2 F | HEART RATE: 110 BPM | DIASTOLIC BLOOD PRESSURE: 90 MMHG | OXYGEN SATURATION: 100 %

## 2021-02-13 DIAGNOSIS — T82.9XXA COMPLICATION ASSOCIATED WITH PERIPHERALLY INSERTED CENTRAL CATHETER (PICC), INITIAL ENCOUNTER: Primary | ICD-10-CM

## 2021-02-13 PROCEDURE — 6360000002 HC RX W HCPCS: Performed by: EMERGENCY MEDICINE

## 2021-02-13 PROCEDURE — 99283 EMERGENCY DEPT VISIT LOW MDM: CPT

## 2021-02-13 RX ADMIN — ALTEPLASE 2 MG: 2.2 INJECTION, POWDER, LYOPHILIZED, FOR SOLUTION INTRAVENOUS at 20:23

## 2021-02-13 ASSESSMENT — ENCOUNTER SYMPTOMS
COUGH: 0
VOMITING: 0
BACK PAIN: 0
CONSTIPATION: 0
NAUSEA: 0
SORE THROAT: 0
DIARRHEA: 0
RHINORRHEA: 0
ABDOMINAL PAIN: 0
EYE REDNESS: 0
SHORTNESS OF BREATH: 0

## 2021-02-13 NOTE — ED TRIAGE NOTES
Pt to ED with complaints of PICC line that won't flush. Pt states home health care nurse was at his home today, couldn't flush PICC line and told pt to come to ED.

## 2021-02-14 NOTE — ED PROVIDER NOTES
Triage Chief Complaint:   Vascular Access Problem    [place]:  Miriam Crocker is a 36 y.o. male that presents presents with a clogged PICC line in his right arm. He is on vancomycin for an infection in his foot. He was was to be on it for 6 weeks to finish on 2/18. The bank was held earlier this week due to the high Vanco trough but when home health came back today they were unable to do anything as his PICC line was clogged. Patient denies any chest pain or shortness of breath. No swelling in his arm. No pain at PICC line site. ROS:   Review of Systems   Constitutional: Negative for chills and fever. HENT: Negative for congestion, rhinorrhea and sore throat. Eyes: Negative for redness and visual disturbance. Respiratory: Negative for cough and shortness of breath. Cardiovascular: Negative for chest pain and leg swelling. Gastrointestinal: Negative for abdominal pain, constipation, diarrhea, nausea and vomiting. Genitourinary: Negative for dysuria and frequency. Musculoskeletal: Negative for arthralgias and back pain. PICC line in R arm   Skin: Negative for rash and wound. Neurological: Negative for syncope and headaches. Psychiatric/Behavioral: Negative for hallucinations and suicidal ideas.        Past Medical History:   Diagnosis Date    Callus of foot     Right foot - see's Dr. Tyson Kang Diabetic ulcer of right midfoot associated with type 2 diabetes mellitus, with fat layer exposed ([facility] [address] 8/11/2020    Dizziness     positional    Essential hypertension     Follows with PCP    Lumbar radiculopathy      Past Surgical History:   Procedure Laterality Date    ACHILLES TENDON SURGERY Right 1/8/2021    RIGHT ACHILLES TENDON LENGTHENING REPAIR performed by Nimo Saavedra DPM at [facility]  03/2018    [facility]    DENTAL SURGERY      teeth extractions -half per patient    HERNIA REPAIR Bilateral 5/27/2020    BIALTERAL HERNIA INGUINAL REPAIR 154/89      Pulse 124      Resp 16      Temp 97.9 °F (36.6 °C)      Temp Source Oral      SpO2 100 %      Weight 170 lb (77.1 kg)      Height 6' (1.829 m)      Head Circumference       Peak Flow       Pain Score       Pain Loc       Pain Edu? Excl. in [address]? BP (!) 154/89   Pulse 124   Temp 97.9 °F (36.6 °C) (Oral)   Resp 16   Ht 6' (1.829 m)   Wt 170 lb (77.1 kg)   SpO2 100%   BMI 23.06 kg/m²   My pulse ox interpretation is - normal  Physical Exam  Constitutional:       General: He is not in acute distress. Appearance: Normal appearance. He is not diaphoretic. HENT:      Head: Normocephalic and atraumatic. Eyes:      General:         Right eye: No discharge. Left eye: No discharge. Conjunctiva/sclera: Conjunctivae normal.   Cardiovascular:      Rate and Rhythm: Normal rate and regular rhythm. Pulses: Normal pulses. Radial pulses are 2+ on the right side and 2+ on the left side. Pulmonary:      Effort: Pulmonary effort is normal. No respiratory distress. Breath sounds: Normal breath sounds. No wheezing or rales. Abdominal:      General: There is no distension. Tenderness: There is no abdominal tenderness. There is no guarding or rebound. Musculoskeletal: Normal range of motion. General: No swelling or tenderness. Right upper arm: He exhibits no swelling and no edema. Arms:       Right hand: He exhibits normal range of motion, normal capillary refill and no swelling. Skin:     General: Skin is warm and dry. Neurological:      General: No focal deficit present. Mental Status: He is alert. Cranial Nerves: No cranial nerve deficit. Sensory: No sensory deficit. Motor: No weakness.    Psychiatric:         Mood and Affect: Mood normal.         Behavior: Behavior normal.         I have reviewed and interpreted all of the currently available lab results from this visit (if applicable):  No results found for this note may have been complete with a voice recognition program.  Effortswere made to edit the dictations, but occasional words are mis-transcribed.           Saul Garcia MD  02/13/21 2041

## 2021-02-14 NOTE — ED NOTES
Pt PICC line now flushing. Flushed PICC with 40mL normal saline with no resistance or complications. Pt tolerates with no complaints.       Audi Mahan RN  02/13/21 8911

## 2021-02-15 ENCOUNTER — HOSPITAL ENCOUNTER (OUTPATIENT)
Dept: HYPERBARIC MEDICINE | Age: 41
Discharge: HOME | End: 2021-02-15
Payer: COMMERCIAL

## 2021-02-15 ENCOUNTER — HOSPITAL ENCOUNTER (OUTPATIENT)
Age: 41
Setting detail: SPECIMEN
Discharge: HOME | End: 2021-02-15
Payer: COMMERCIAL

## 2021-02-15 VITALS
HEART RATE: 92 BPM | TEMPERATURE: 97.7 F | RESPIRATION RATE: 18 BRPM | SYSTOLIC BLOOD PRESSURE: 117 MMHG | DIASTOLIC BLOOD PRESSURE: 81 MMHG

## 2021-02-15 DIAGNOSIS — L97.414 DIABETIC ULCER OF RIGHT MIDFOOT ASSOCIATED WITH TYPE 2 DIABETES MELLITUS, WITH NECROSIS OF BONE (HCC): Primary | ICD-10-CM

## 2021-02-15 DIAGNOSIS — E11.621 DIABETIC ULCER OF RIGHT MIDFOOT ASSOCIATED WITH TYPE 2 DIABETES MELLITUS, WITH NECROSIS OF BONE (HCC): Primary | ICD-10-CM

## 2021-02-15 LAB
GLUCOSE BLD-MCNC: 124 MG/DL (ref 70–99)
GLUCOSE BLD-MCNC: 199 MG/DL (ref 70–99)

## 2021-02-15 PROCEDURE — 82962 GLUCOSE BLOOD TEST: CPT

## 2021-02-15 PROCEDURE — 99183 HYPERBARIC OXYGEN THERAPY: CPT | Performed by: NURSE PRACTITIONER

## 2021-02-15 PROCEDURE — 80202 ASSAY OF VANCOMYCIN: CPT

## 2021-02-15 PROCEDURE — G0277 HBOT, FULL BODY CHAMBER, 30M: HCPCS

## 2021-02-15 NOTE — PROGRESS NOTES
[facility],4Th Floor  Hyperbaric Oxygen Therapy   Progress Note      NAME: [address] [place] [address] RECORD NUMBER:  [id number]  AGE: 36 y.o. GENDER: male  : 1980  EPISODE DATE:  2/15/2021     Subjective     HBO Treatment Number: 6 out of Total Treatments: 30    HBO Diagnosis:     Indications: Lower Extremity Diabetic Wound ___(site)(right foot)  Cloretta BarcelonaCruzita Cable 3     Safety checks performed prior to treatment. See doc flowsheets for documentation. Objective           Recent Labs     02/15/21  0842 02/15/21  1052   POCGLU 199* 124*       Pre treatment Vital Signs       Temp: 97.6 °F (36.4 °C)     Pulse: 90     Resp: 16     BP: 118/84     Blood Sugar 199    Post treatment Vital Signs  Temp: 97.7 °F (36.5 °C)  Pulse: 92  Resp: 18  BP: 117/81  Blood Sugar 124    Assessment        Physical Exam:  General Appearance:  alert and oriented to person, place and time, well-developed and well-nourished, in no acute distress    Pre Tympanic Membrane Assessment:  tympanic membranes intact bilaterally    Post Tympanic Membrane Assessment:  Right: Normal(per Pt.)  Left: Normal(per Pt.)    Pulmonary/Chest:  clear to auscultation bilaterally- no wheezes, rales or rhonchi, normal air movement, no respiratory distress    Cardiovascular:  normal, regular rate and rhythm    Chamber #: [id number]       Treatment Start Time: 0857     Pressure Reached Time: 0916  WALESKA : 2  Number of Air Breaks:  Treatment Status: Other (Comment)(Pt. did not wear air mask during compression; air mask on about 20 min into Tx. to help Pt. relax. Air mask on again several minutes just before Tx.aborted but Pt. unable to relax enough to finish Tx.)      Decompression Time: 1018   Treatment End Time: 1035    Total time in chamber: 98  Minutes at depth: 60    Adverse Event: no      I was present on these premises and immediately available to furnish assistance & direction throughout the procedure.        Danica Colón is a 36 y.o. male  did successfully complete today's hyperbaric oxygen treatment at [address] [place] [address] [employer]. In my clinical judgement, ongoing HBO therapy is  necessary at this time, given a threat to patient function, limb or life from the current condition. Supervision and attendance of Hyperbaric Oxygen Therapy provided. Continue HBO treatment as outlined in the treatment plan. Hyperbaric Oxygen: Chano Dickerson tolerated Treatment Number: 6 well today without complications.      Electronically signed by RAFAEL Vega CNP on 2/15/2021 at 4:13 PM

## 2021-02-15 NOTE — TELEPHONE ENCOUNTER
Called Hanane at [facility] and advised to continue the old dose of vancomycin 1750 mg q 12 hours compound per protocol.

## 2021-02-16 ENCOUNTER — HOSPITAL ENCOUNTER (OUTPATIENT)
Dept: HYPERBARIC MEDICINE | Age: 41
Discharge: HOME | End: 2021-02-16
Payer: COMMERCIAL

## 2021-02-16 ENCOUNTER — HOSPITAL ENCOUNTER (OUTPATIENT)
Age: 41
Setting detail: SPECIMEN
Discharge: HOME | End: 2021-02-16
Payer: COMMERCIAL

## 2021-02-16 VITALS
TEMPERATURE: 96.7 F | HEART RATE: 108 BPM | SYSTOLIC BLOOD PRESSURE: 123 MMHG | RESPIRATION RATE: 18 BRPM | DIASTOLIC BLOOD PRESSURE: 82 MMHG

## 2021-02-16 DIAGNOSIS — E11.621 DIABETIC ULCER OF RIGHT MIDFOOT ASSOCIATED WITH TYPE 2 DIABETES MELLITUS, WITH NECROSIS OF BONE (HCC): Primary | ICD-10-CM

## 2021-02-16 DIAGNOSIS — L97.414 DIABETIC ULCER OF RIGHT MIDFOOT ASSOCIATED WITH TYPE 2 DIABETES MELLITUS, WITH NECROSIS OF BONE (HCC): Primary | ICD-10-CM

## 2021-02-16 LAB
DOSE AMOUNT: ABNORMAL
DOSE TIME: ABNORMAL
GLUCOSE BLD-MCNC: 160 MG/DL (ref 70–99)
GLUCOSE BLD-MCNC: 169 MG/DL (ref 70–99)
VANCOMYCIN TROUGH: 23.1 UG/ML (ref 10–20)

## 2021-02-16 PROCEDURE — 82962 GLUCOSE BLOOD TEST: CPT

## 2021-02-16 PROCEDURE — 85025 COMPLETE CBC W/AUTO DIFF WBC: CPT

## 2021-02-16 PROCEDURE — G0277 HBOT, FULL BODY CHAMBER, 30M: HCPCS

## 2021-02-16 PROCEDURE — 86140 C-REACTIVE PROTEIN: CPT

## 2021-02-16 PROCEDURE — 85652 RBC SED RATE AUTOMATED: CPT

## 2021-02-16 PROCEDURE — 80053 COMPREHEN METABOLIC PANEL: CPT

## 2021-02-16 NOTE — PROGRESS NOTES
Addended by: Jennifer Turcios on: 10/9/2018 12:50 PM     Modules accepted: Orders patient function, limb or life from the current condition. Supervision and attendance of Hyperbaric Oxygen Therapy provided. Continue HBO treatment as outlined in the treatment plan. Hyperbaric Oxygen: Ish Sequin tolerated Treatment Number: 7 well today without complications.      Electronically signed by RAFAEL Acevedo CNP on 2/16/2021 at 3:00 PM

## 2021-02-17 ENCOUNTER — HOSPITAL ENCOUNTER (OUTPATIENT)
Dept: HYPERBARIC MEDICINE | Age: 41
Discharge: HOME | End: 2021-02-17
Payer: COMMERCIAL

## 2021-02-17 VITALS
DIASTOLIC BLOOD PRESSURE: 100 MMHG | RESPIRATION RATE: 20 BRPM | SYSTOLIC BLOOD PRESSURE: 144 MMHG | HEART RATE: 83 BPM | TEMPERATURE: 96.9 F

## 2021-02-17 DIAGNOSIS — E11.621 DIABETIC ULCER OF RIGHT MIDFOOT ASSOCIATED WITH TYPE 2 DIABETES MELLITUS, WITH NECROSIS OF BONE (HCC): Primary | ICD-10-CM

## 2021-02-17 DIAGNOSIS — L97.414 DIABETIC ULCER OF RIGHT MIDFOOT ASSOCIATED WITH TYPE 2 DIABETES MELLITUS, WITH NECROSIS OF BONE (HCC): Primary | ICD-10-CM

## 2021-02-17 LAB
GLUCOSE BLD-MCNC: 106 MG/DL (ref 70–99)
GLUCOSE BLD-MCNC: 165 MG/DL (ref 70–99)

## 2021-02-17 PROCEDURE — G0277 HBOT, FULL BODY CHAMBER, 30M: HCPCS

## 2021-02-17 PROCEDURE — 82962 GLUCOSE BLOOD TEST: CPT

## 2021-02-17 NOTE — PROGRESS NOTES
[facility]  Hyperbaric Oxygen Therapy   Progress Note      NAME: [address] [place] [address] Road RECORD NUMBER:  [id number]  AGE: 36 y.o. GENDER: male  : 1980  EPISODE DATE:  2021     Subjective     HBO Treatment Number: 8 out of Total Treatments: 30    HBO Diagnosis:     Indications: Lower Extremity Diabetic Wound ___(site)(right foot)  Joann Rene 3     Safety checks performed prior to treatment. See doc flowsheets for documentation. Objective           Recent Labs     21  0846 21  1106   POCGLU 165* 106*       Pre treatment Vital Signs       Temp: 98.2 °F (36.8 °C)     Pulse: 99     Resp: 20     BP: 137/87     Blood Sugar 165    Post treatment Vital Signs  Temp: 96.9 °F (36.1 °C)  Pulse: 83  Resp: 20  BP: (!) 144/100(Pt. emotional after treatment; states \"I'm scared \". Emotional support givn. SN will inform Dr. Coretta Armijo)  Blood Sugar 106    Assessment        Physical Exam:  General Appearance:  alert and oriented to person, place and time, well-developed and well-nourished, in no acute distress    Pre Tympanic Membrane Assessment:  tympanic membranes intact bilaterally, normal color, normal light reflex bilaterally    Post Tympanic Membrane Assessment:  Right: Normal(per Pt.)  Left: Normal(per Pt.)    Pulmonary/Chest:  clear to auscultation bilaterally- no wheezes, rales or rhonchi, normal air movement, no respiratory distress    Cardiovascular:  regular rate and rhythm, no murmurs rubs or gallops    Chamber #: [id number]       Treatment Start Time: 0854     Pressure Reached Time: 0911  WALESKA : 2  Number of Air Breaks:  Treatment Status: Other (Comment)(Pt. put air mask on x 2 @ 0930 & 0959 due to becoming anxious; Pt. calmed down after a few minutes & removed mask . Providers are aware of this.)      Decompression Time: 1041   Treatment End Time: 7342    Symptoms Noted During Treatment: Other (Comment)(See above. Pt. did not use air mask during compression; Now taking Lorazepam pre HBO which is becoming more effective with doseage increased)    TOTAL TIME AT DEPTH-90    TOTAL TIME IN CHAMBER-122    Adverse Event: no      I was present on these premises and immediately available to furnish assistance & direction throughout the procedure. Plan          Gris Ward is a 36 y.o. male  did successfully complete today's hyperbaric oxygen treatment at [address] [place] [address]. In my clinical judgement, ongoing HBO therapy is  necessary at this time, given a threat to patient function, limb or life from the current condition. Supervision and attendance of Hyperbaric Oxygen Therapy provided. Continue HBO treatment as outlined in the treatment plan. Hyperbaric Oxygen: Gris Ward tolerated Treatment Number: 8 well today without complications.      Electronically signed by Paula Ellis MD on 2/17/2021 at 11:51 AM

## 2021-02-18 ENCOUNTER — TELEPHONE (OUTPATIENT)
Dept: INFECTIOUS DISEASES | Age: 41
End: 2021-02-18

## 2021-02-18 ENCOUNTER — HOSPITAL ENCOUNTER (OUTPATIENT)
Dept: HYPERBARIC MEDICINE | Age: 41
Discharge: HOME | End: 2021-02-18
Payer: COMMERCIAL

## 2021-02-18 VITALS
TEMPERATURE: 98.6 F | SYSTOLIC BLOOD PRESSURE: 130 MMHG | RESPIRATION RATE: 16 BRPM | DIASTOLIC BLOOD PRESSURE: 88 MMHG | HEART RATE: 79 BPM

## 2021-02-18 DIAGNOSIS — E11.621 DIABETIC ULCER OF RIGHT MIDFOOT ASSOCIATED WITH TYPE 2 DIABETES MELLITUS, WITH NECROSIS OF BONE (HCC): Primary | ICD-10-CM

## 2021-02-18 DIAGNOSIS — L97.414 DIABETIC ULCER OF RIGHT MIDFOOT ASSOCIATED WITH TYPE 2 DIABETES MELLITUS, WITH NECROSIS OF BONE (HCC): Primary | ICD-10-CM

## 2021-02-18 LAB
ALBUMIN SERPL-MCNC: 3.9 GM/DL (ref 3.4–5)
ALP BLD-CCNC: 49 IU/L (ref 40–129)
ALT SERPL-CCNC: 22 U/L (ref 10–40)
ANION GAP SERPL CALCULATED.3IONS-SCNC: 12 MMOL/L (ref 4–16)
AST SERPL-CCNC: 17 IU/L (ref 15–37)
BASOPHILS ABSOLUTE: 0 K/CU MM
BASOPHILS RELATIVE PERCENT: 0.2 % (ref 0–1)
BILIRUB SERPL-MCNC: 0.2 MG/DL (ref 0–1)
BUN BLDV-MCNC: 10 MG/DL (ref 6–23)
C-REACTIVE PROTEIN, HIGH SENSITIVITY: 6.9 MG/L
CALCIUM SERPL-MCNC: 9.1 MG/DL (ref 8.3–10.6)
CHLORIDE BLD-SCNC: 98 MMOL/L (ref 99–110)
CO2: 24 MMOL/L (ref 21–32)
CREAT SERPL-MCNC: 1 MG/DL (ref 0.9–1.3)
DIFFERENTIAL TYPE: ABNORMAL
EOSINOPHILS ABSOLUTE: 0.2 K/CU MM
EOSINOPHILS RELATIVE PERCENT: 4.2 % (ref 0–3)
ERYTHROCYTE SEDIMENTATION RATE: 40 MM/HR (ref 0–15)
GFR AFRICAN AMERICAN: >60 ML/MIN/1.73M2
GFR NON-AFRICAN AMERICAN: >60 ML/MIN/1.73M2
GLUCOSE BLD-MCNC: 109 MG/DL (ref 70–99)
GLUCOSE BLD-MCNC: 158 MG/DL (ref 70–99)
GLUCOSE BLD-MCNC: 207 MG/DL (ref 70–99)
HCT VFR BLD CALC: 36.1 % (ref 42–52)
HEMOGLOBIN: 11.9 GM/DL (ref 13.5–18)
IMMATURE NEUTROPHIL %: 0.2 % (ref 0–0.43)
LYMPHOCYTES ABSOLUTE: 2.4 K/CU MM
LYMPHOCYTES RELATIVE PERCENT: 41.3 % (ref 24–44)
MCH RBC QN AUTO: 30.5 PG (ref 27–31)
MCHC RBC AUTO-ENTMCNC: 33 % (ref 32–36)
MCV RBC AUTO: 92.6 FL (ref 78–100)
MONOCYTES ABSOLUTE: 0.6 K/CU MM
MONOCYTES RELATIVE PERCENT: 10 % (ref 0–4)
NUCLEATED RBC %: 0 %
PDW BLD-RTO: 12.8 % (ref 11.7–14.9)
PLATELET # BLD: 396 K/CU MM (ref 140–440)
PMV BLD AUTO: 10.1 FL (ref 7.5–11.1)
POTASSIUM SERPL-SCNC: 4.2 MMOL/L (ref 3.5–5.1)
RBC # BLD: 3.9 M/CU MM (ref 4.6–6.2)
SEGMENTED NEUTROPHILS ABSOLUTE COUNT: 2.5 K/CU MM
SEGMENTED NEUTROPHILS RELATIVE PERCENT: 44.1 % (ref 36–66)
SODIUM BLD-SCNC: 134 MMOL/L (ref 135–145)
TOTAL IMMATURE NEUTOROPHIL: 0.01 K/CU MM
TOTAL NUCLEATED RBC: 0 K/CU MM
TOTAL PROTEIN: 7.1 GM/DL (ref 6.4–8.2)
WBC # BLD: 5.7 K/CU MM (ref 4–10.5)

## 2021-02-18 PROCEDURE — G0277 HBOT, FULL BODY CHAMBER, 30M: HCPCS

## 2021-02-18 PROCEDURE — 82962 GLUCOSE BLOOD TEST: CPT

## 2021-02-18 NOTE — PROGRESS NOTES
[facility],4Th Floor  Hyperbaric Oxygen Therapy   Progress Note      NAME: [address] [place] [address] RECORD NUMBER:  [id number]  AGE: 36 y.o. GENDER: male  : 1980  EPISODE DATE:  2021     Subjective     HBO Treatment Number: 9 out of Total Treatments: 30    HBO Diagnosis:     Indications: Lower Extremity Diabetic Wound ___(site)(right foot)  Angela SatoCatheline Beverage 3     Safety checks performed prior to treatment. See doc flowsheets for documentation. Objective           Recent Labs     21  0848 21  1111   POCGLU 158* 109*       Pre treatment Vital Signs       Temp: 98.9 °F (37.2 °C)     Pulse: 94     Resp: 16     BP: 122/84     Blood Sugar 158    Post treatment Vital Signs  Temp: 98.6 °F (37 °C)  Pulse: 79  Resp: 16  BP: 130/88  Blood Sugar 109    Assessment        Physical Exam:  General Appearance:  alert and oriented to person, place and time, well-developed and well-nourished, in no acute distress and alert and oriented to person, place and time    Pre Tympanic Membrane Assessment:  tympanic membranes intact bilaterally    Post Tympanic Membrane Assessment:  Right: Normal(per Pt.)  Left: Normal(per Pt.)    Pulmonary/Chest:  clear to auscultation bilaterally- no wheezes, rales or rhonchi, normal air movement, no respiratory distress    Cardiovascular:  normal, regular rate and rhythm    Chamber #: [id number]       Treatment Start Time: 0859     Pressure Reached Time: 0917  WALESKA : 2  Number of Air Breaks:  Treatment Status: Other (Comment)(Pt. applied air mask x 2 (leaving it on for about 5-6 minutes) during the treatment due to becoming anxious. Pt. tolerating treatment better with increasing Lorazepam dose to 2 mg. Providers aware Pt. uses air mask)      Decompression Time: 1047   Treatment End Time: 1102    Symptoms Noted During Treatment: Other (Comment)(See above)    Adverse Event: no      I was present on these premises and immediately available to furnish assistance & direction throughout the procedure. Plan          Nichol Quick is a 36 y.o. male  did successfully complete today's hyperbaric oxygen treatment at [address] [place] [address]. In my clinical judgement, ongoing HBO therapy is  necessary at this time, given a threat to patient function, limb or life from the current condition. Supervision and attendance of Hyperbaric Oxygen Therapy provided. Continue HBO treatment as outlined in the treatment plan. Hyperbaric Oxygen: Nichol Quick tolerated Treatment Number: 9 well today without complications.      Electronically signed by RAFAEL Nelson CNP on 2/18/2021 at 11:37 AM

## 2021-02-18 NOTE — TELEPHONE ENCOUNTER
Called from [facility] pt is finished with ABX 2/18/21. Can the PICC line be pulled? Advised to maintain the line until she hears from us. Please advise.     Sed Rate 40 high  CRP 6.9 high  Monocytes % 10.0 high  Eosinophils % 4.2 high

## 2021-02-19 ENCOUNTER — HOSPITAL ENCOUNTER (OUTPATIENT)
Dept: HYPERBARIC MEDICINE | Age: 41
Discharge: HOME | End: 2021-02-19
Payer: COMMERCIAL

## 2021-02-19 VITALS
DIASTOLIC BLOOD PRESSURE: 98 MMHG | TEMPERATURE: 98.2 F | RESPIRATION RATE: 16 BRPM | SYSTOLIC BLOOD PRESSURE: 153 MMHG | HEART RATE: 94 BPM

## 2021-02-19 DIAGNOSIS — L97.414 DIABETIC ULCER OF RIGHT MIDFOOT ASSOCIATED WITH TYPE 2 DIABETES MELLITUS, WITH NECROSIS OF BONE (HCC): Primary | ICD-10-CM

## 2021-02-19 DIAGNOSIS — E11.621 DIABETIC ULCER OF RIGHT MIDFOOT ASSOCIATED WITH TYPE 2 DIABETES MELLITUS, WITH NECROSIS OF BONE (HCC): Primary | ICD-10-CM

## 2021-02-19 DIAGNOSIS — T82.594A OBSTRUCTION OF CENTRAL LINE, INITIAL ENCOUNTER (HCC): Primary | ICD-10-CM

## 2021-02-19 LAB
GLUCOSE BLD-MCNC: 159 MG/DL (ref 70–99)
GLUCOSE BLD-MCNC: 184 MG/DL (ref 70–99)

## 2021-02-19 PROCEDURE — G0277 HBOT, FULL BODY CHAMBER, 30M: HCPCS

## 2021-02-19 PROCEDURE — 82962 GLUCOSE BLOOD TEST: CPT

## 2021-02-19 NOTE — PROGRESS NOTES
[facility]  Hyperbaric Oxygen Therapy   Progress Note      NAME: Ana Lilia Roca   MEDICAL RECORD NUMBER:  [id number]  AGE: 36 y.o. GENDER: male  : 1980  EPISODE DATE:  2021     Subjective     HBO Treatment Number: 10 out of Total Treatments: 30    HBO Diagnosis:     Indications: Lower Extremity Diabetic Wound ___(site)(right foot)  Marley GaltLuretha Males 3     Safety checks performed prior to treatment. See doc flowsheets for documentation. Objective           Recent Labs     21  1308 21  1531   POCGLU 184* 159*       Pre treatment Vital Signs       Temp: 98.3 °F (36.8 °C)     Pulse: 112     Resp: 16     BP: 136/88       Post treatment Vital Signs  Temp: 98.2 °F (36.8 °C)  Pulse: 94  Resp: 16  BP: (!) 153/98(Pt. having anxiety post treatment; emotional support given ;Pt. calmed down)      Assessment        Physical Exam:  General Appearance:  alert and oriented to person, place and time, well-developed and well-nourished, in no acute distress    Pre Tympanic Membrane Assessment:  tympanic membranes not visualized do to cerumen    Post Tympanic Membrane Assessment:  Right: Normal(per Pt.)  Left: Normal(Per Pt.)    Pulmonary/Chest:  clear to auscultation bilaterally- no wheezes, rales or rhonchi, normal air movement, no respiratory distress    Cardiovascular:  normal    Chamber #: [id number]       Treatment Start Time: 1315     Pressure Reached Time: 1332  WALESKA : 2  Number of Air Breaks:  Treatment Status: Other (Comment)(Pt. applied air mask x 1 for a few minutes during Treatment when he became anxious. Provider was informed pre treatment that Pt. uses air mask. Lorazepam also taken pre Tx.)      Decompression Time: 1502   Treatment End Time: 1516    Symptoms Noted During Treatment: Other (Comment)(see above)    Adverse Event: no      I was present on these premises and immediately available to furnish assistance & direction throughout the procedure.        Plan Ana Lilia Roca is a 36 y.o. male  did successfully complete today's hyperbaric oxygen treatment at [address] In my clinical judgement, ongoing HBO therapy is  necessary at this time, given a threat to patient function, limb or life from the current condition. Supervision and attendance of Hyperbaric Oxygen Therapy provided. Continue HBO treatment as outlined in the treatment plan. Hyperbaric Oxygen: Ana Lilia Roca tolerated Treatment Number: 10 well today without complications.      Electronically signed by Homa Sung MD on 2/19/2021 at 5:06 PM

## 2021-02-22 ENCOUNTER — HOSPITAL ENCOUNTER (OUTPATIENT)
Dept: HYPERBARIC MEDICINE | Age: 41
Discharge: HOME | End: 2021-02-22
Payer: COMMERCIAL

## 2021-02-22 VITALS
SYSTOLIC BLOOD PRESSURE: 103 MMHG | TEMPERATURE: 97.7 F | DIASTOLIC BLOOD PRESSURE: 74 MMHG | RESPIRATION RATE: 16 BRPM | HEART RATE: 96 BPM

## 2021-02-22 DIAGNOSIS — E11.621 DIABETIC ULCER OF RIGHT MIDFOOT ASSOCIATED WITH TYPE 2 DIABETES MELLITUS, WITH NECROSIS OF BONE (HCC): Primary | ICD-10-CM

## 2021-02-22 DIAGNOSIS — L97.414 DIABETIC ULCER OF RIGHT MIDFOOT ASSOCIATED WITH TYPE 2 DIABETES MELLITUS, WITH NECROSIS OF BONE (HCC): Primary | ICD-10-CM

## 2021-02-22 LAB
GLUCOSE BLD-MCNC: 198 MG/DL (ref 70–99)
GLUCOSE BLD-MCNC: 98 MG/DL (ref 70–99)

## 2021-02-22 PROCEDURE — 99183 HYPERBARIC OXYGEN THERAPY: CPT | Performed by: NURSE PRACTITIONER

## 2021-02-22 PROCEDURE — G0277 HBOT, FULL BODY CHAMBER, 30M: HCPCS

## 2021-02-22 PROCEDURE — 82962 GLUCOSE BLOOD TEST: CPT

## 2021-02-22 NOTE — PROGRESS NOTES
[address] [facility],4Th Floor  Hyperbaric Oxygen Therapy   Progress Note      NAME: [address] [place] [address] RECORD NUMBER:  [id number]  AGE: 36 y.o. GENDER: male  : 1980  EPISODE DATE:  2021     Subjective     HBO Treatment Number: 11 out of Total Treatments: 30    HBO Diagnosis:     Indications: Lower Extremity Diabetic Wound ___(site)(right foot)  Tim Joseph 3     Safety checks performed prior to treatment. See doc flowsheets for documentation. Objective           Recent Labs     21  0837 21  1058   POCGLU 198* 98       Pre treatment Vital Signs       Temp: 98.1 °F (36.7 °C)     Pulse: 98     Resp: 16     BP: 121/82     Blood Sugar 198    Post treatment Vital Signs  Temp: 97.7 °F (36.5 °C)  Pulse: 96  Resp: 16  BP: 103/74  Blood Sugar 98    Assessment        Physical Exam:  General Appearance:  alert and oriented to person, place and time, well-developed and well-nourished, in no acute distress    Pre Tympanic Membrane Assessment:  tympanic membranes intact bilaterally    Post Tympanic Membrane Assessment:  Right: Normal(per Pt.)  Left: Normal(per Pt.)    Pulmonary/Chest:  clear to auscultation bilaterally- no wheezes, rales or rhonchi, normal air movement, no respiratory distress    Cardiovascular:  normal, regular rate and rhythm    Chamber #: [id number]       Treatment Start Time: 0847     Pressure Reached Time: 0905  WALESKA : 2  Number of Air Breaks:  Treatment Status: Other (Comment)(Pt. applied air mask x 1 during treatment due to anxiety; left on for several minutes then removed. Pt. also takes Lorazepam pre Tx.  Provider aware patient uses air mask ; using less and tolerating treating better)      Decompression Time: 1035   Treatment End Time: 1050    Symptoms Noted During Treatment: Other (Comment)(see above)    Adverse Event: no    Total time in chamber: 123  Minutes at depth: 90    I was present on these premises and immediately available to furnish assistance & direction throughout the procedure. Plan          Lavonne Rubio is a 36 y.o. male  did successfully complete today's hyperbaric oxygen treatment at [address] [place] [address]. In my clinical judgement, ongoing HBO therapy is  necessary at this time, given a threat to patient function, limb or life from the current condition. Supervision and attendance of Hyperbaric Oxygen Therapy provided. Continue HBO treatment as outlined in the treatment plan. Hyperbaric Oxygen: Lavonne Rubio tolerated Treatment Number: 31 well today without complications.      Electronically signed by RAFAEL Cosme CNP on 2/22/2021 at 5:24 PM

## 2021-02-23 ENCOUNTER — HOSPITAL ENCOUNTER (OUTPATIENT)
Dept: HYPERBARIC MEDICINE | Age: 41
Discharge: HOME | End: 2021-02-23
Payer: COMMERCIAL

## 2021-02-23 VITALS
HEART RATE: 96 BPM | DIASTOLIC BLOOD PRESSURE: 87 MMHG | RESPIRATION RATE: 20 BRPM | SYSTOLIC BLOOD PRESSURE: 130 MMHG | TEMPERATURE: 98 F

## 2021-02-23 DIAGNOSIS — L97.414 DIABETIC ULCER OF RIGHT MIDFOOT ASSOCIATED WITH TYPE 2 DIABETES MELLITUS, WITH NECROSIS OF BONE (HCC): Primary | ICD-10-CM

## 2021-02-23 DIAGNOSIS — E11.621 DIABETIC ULCER OF RIGHT MIDFOOT ASSOCIATED WITH TYPE 2 DIABETES MELLITUS, WITH NECROSIS OF BONE (HCC): Primary | ICD-10-CM

## 2021-02-23 LAB
GLUCOSE BLD-MCNC: 119 MG/DL (ref 70–99)
GLUCOSE BLD-MCNC: 180 MG/DL (ref 70–99)

## 2021-02-23 PROCEDURE — 82962 GLUCOSE BLOOD TEST: CPT

## 2021-02-23 PROCEDURE — 99183 HYPERBARIC OXYGEN THERAPY: CPT | Performed by: NURSE PRACTITIONER

## 2021-02-23 PROCEDURE — G0277 HBOT, FULL BODY CHAMBER, 30M: HCPCS

## 2021-02-23 ASSESSMENT — PAIN SCALES - GENERAL: PAINLEVEL_OUTOF10: 3

## 2021-02-23 ASSESSMENT — PAIN DESCRIPTION - LOCATION: LOCATION: FOOT

## 2021-02-23 NOTE — PROGRESS NOTES
[address] [facility],4Th Floor  Hyperbaric Oxygen Therapy   Progress Note      NAME: [address] [place] [address] RECORD NUMBER:  [id number]  AGE: 36 y.o. GENDER: male  : 1980  EPISODE DATE:  2021     Subjective     HBO Treatment Number: 12 out of Total Treatments: 30    HBO Diagnosis:     Indications: Lower Extremity Diabetic Wound ___(site)(right foot)  Arian Thomas Asp 3     Safety checks performed prior to treatment. See doc flowsheets for documentation. Objective           Recent Labs     21  0832 21  1105   POCGLU 180* 119*       Pre treatment Vital Signs       Temp: 98.4 °F (36.9 °C)     Pulse: 97     Resp: 20     BP: 132/89(SN will inform SERENA Tejada)     Blood Sugar 180    Post treatment Vital Signs  Temp: 98 °F (36.7 °C)  Pulse: 96  Resp: 20  BP: 130/87  Blood Sugar 119    Assessment        Physical Exam:  General Appearance:  alert and oriented to person, place and time, well-developed and well-nourished, in no acute distress    Pre Tympanic Membrane Assessment:  tympanic membranes intact bilaterally    Post Tympanic Membrane Assessment:  Right: Normal(per Pt.)  Left: Normal(per Pt.)    Pulmonary/Chest:  clear to auscultation bilaterally- no wheezes, rales or rhonchi, normal air movement, no respiratory distress    Cardiovascular:  normal, regular rate and rhythm    Chamber #: [id number]       Treatment Start Time: 0851     Pressure Reached Time: 0910  WALESKA : 2  Number of Air Breaks:  Treatment Status: Other (Comment)(pt. applied air once during treatment  due to anxiety. Tolerated remainder of treatment better.  Providers aware patient uses air during treatment.)      Decompression Time: 1040   Treatment End Time: 1056    Symptoms Noted During Treatment: Other (Comment)(see above)    Adverse Event: no      Total time in chamber: 125  Minutes at depth: 90    I was present on these premises and immediately available to furnish assistance & direction throughout the procedure. Plan          Gatito Quinteros is a 36 y.o. male  did successfully complete today's hyperbaric oxygen treatment at [address] [place] [address]. In my clinical judgement, ongoing HBO therapy is  necessary at this time, given a threat to patient function, limb or life from the current condition. Supervision and attendance of Hyperbaric Oxygen Therapy provided. Continue HBO treatment as outlined in the treatment plan. Hyperbaric Oxygen: Gatito Quinteros tolerated Treatment Number: 12 well today without complications.      Electronically signed by RAFAEL Nixon CNP on 2/23/2021 at 5:57 PM

## 2021-02-24 ENCOUNTER — HOSPITAL ENCOUNTER (OUTPATIENT)
Dept: HYPERBARIC MEDICINE | Age: 41
Discharge: HOME | End: 2021-02-24
Payer: COMMERCIAL

## 2021-02-24 VITALS
HEART RATE: 99 BPM | DIASTOLIC BLOOD PRESSURE: 79 MMHG | TEMPERATURE: 98.1 F | RESPIRATION RATE: 16 BRPM | SYSTOLIC BLOOD PRESSURE: 116 MMHG

## 2021-02-24 DIAGNOSIS — E11.621 DIABETIC ULCER OF RIGHT MIDFOOT ASSOCIATED WITH TYPE 2 DIABETES MELLITUS, WITH NECROSIS OF BONE (HCC): Primary | ICD-10-CM

## 2021-02-24 DIAGNOSIS — L97.414 DIABETIC ULCER OF RIGHT MIDFOOT ASSOCIATED WITH TYPE 2 DIABETES MELLITUS, WITH NECROSIS OF BONE (HCC): Primary | ICD-10-CM

## 2021-02-24 LAB
GLUCOSE BLD-MCNC: 108 MG/DL (ref 70–99)
GLUCOSE BLD-MCNC: 175 MG/DL (ref 70–99)

## 2021-02-24 PROCEDURE — 82962 GLUCOSE BLOOD TEST: CPT

## 2021-02-24 PROCEDURE — G0277 HBOT, FULL BODY CHAMBER, 30M: HCPCS

## 2021-02-24 RX ORDER — LORAZEPAM 1 MG/1
2 TABLET ORAL EVERY 8 HOURS PRN
Qty: 30 TABLET | Refills: 0 | Status: SHIPPED | OUTPATIENT
Start: 2021-02-24 | End: 2021-03-10

## 2021-02-24 NOTE — PROGRESS NOTES
[address] [facility],4Th Floor  Hyperbaric Oxygen Therapy   Progress Note      NAME: [address] [place] [address] RECORD NUMBER:  [id number]  AGE: 36 y.o. GENDER: male  : 1980  EPISODE DATE:  2021     Subjective     HBO Treatment Number: 13 out of Total Treatments: 30    HBO Diagnosis:     Indications: Lower Extremity Diabetic Wound ___(site)(right foot)  Arlene Calle 3     Safety checks performed prior to treatment. See doc flowsheets for documentation. Objective           Recent Labs     21  0941 21  1209   POCGLU 175* 108*       Pre treatment Vital Signs       Temp: 98 °F (36.7 °C)     Pulse: 99     Resp: 20     BP: 138/81     Blood Sugar 175    Post treatment Vital Signs  Temp: 98.1 °F (36.7 °C)  Pulse: 99  Resp: 16  BP: 116/79  Blood Sugar 108    Assessment        Physical Exam:  General Appearance:  alert and oriented to person, place and time, well-developed and well-nourished, in no acute distress    Pre Tympanic Membrane Assessment:  tympanic membranes intact bilaterally, normal color, normal light reflex bilaterally    Post Tympanic Membrane Assessment:  Right: Normal(Reports hurt a little initially then better; see left ear)  Left: Normal(Reports hurt a little initally , then better .  Pt. had been instructed by SERENA Tejada to take Claritin pre Tx.)    Pulmonary/Chest:  clear to auscultation bilaterally- no wheezes, rales or rhonchi, normal air movement, no respiratory distress    Cardiovascular:  regular rate and rhythm, no murmurs rubs or gallops    Chamber #: [id number]       Treatment Start Time: 0957     Pressure Reached Time: 1014  WALESKA : 2  Number of Air Breaks:  Treatment Status: Other (Comment)(Pt. used air mask once for anxiety this treatment at 1037 for several minutes then removed; providers are aware)      Decompression Time: 1144   Treatment End Time: 1200    Symptoms Noted During Treatment: Other (Comment)(see above)    TOTAL TIME AT DEPTH-90    TOTAL TIME IN CHAMBER-123    Adverse Event: no      I was present on these premises and immediately available to furnish assistance & direction throughout the procedure. Plan          David Choudhary is a 36 y.o. male  did successfully complete today's hyperbaric oxygen treatment at [address] [place] [address]. In my clinical judgement, ongoing HBO therapy is  necessary at this time, given a threat to patient function, limb or life from the current condition. Supervision and attendance of Hyperbaric Oxygen Therapy provided. Continue HBO treatment as outlined in the treatment plan. Hyperbaric Oxygen: David Choudhary tolerated Treatment Number: 15 well today without complications.      Electronically signed by Vahid Joseph MD on 2/24/2021 at 6:24 PM

## 2021-02-25 ENCOUNTER — HOSPITAL ENCOUNTER (OUTPATIENT)
Dept: HYPERBARIC MEDICINE | Age: 41
Discharge: HOME | End: 2021-02-25
Payer: COMMERCIAL

## 2021-02-25 VITALS
HEART RATE: 98 BPM | DIASTOLIC BLOOD PRESSURE: 91 MMHG | TEMPERATURE: 97.4 F | RESPIRATION RATE: 20 BRPM | SYSTOLIC BLOOD PRESSURE: 132 MMHG

## 2021-02-25 DIAGNOSIS — E11.621 DIABETIC ULCER OF RIGHT MIDFOOT ASSOCIATED WITH TYPE 2 DIABETES MELLITUS, WITH NECROSIS OF BONE (HCC): Primary | ICD-10-CM

## 2021-02-25 DIAGNOSIS — L97.414 DIABETIC ULCER OF RIGHT MIDFOOT ASSOCIATED WITH TYPE 2 DIABETES MELLITUS, WITH NECROSIS OF BONE (HCC): Primary | ICD-10-CM

## 2021-02-25 LAB
GLUCOSE BLD-MCNC: 110 MG/DL (ref 70–99)
GLUCOSE BLD-MCNC: 167 MG/DL (ref 70–99)

## 2021-02-25 PROCEDURE — G0277 HBOT, FULL BODY CHAMBER, 30M: HCPCS

## 2021-02-25 PROCEDURE — 82962 GLUCOSE BLOOD TEST: CPT

## 2021-02-25 NOTE — PROGRESS NOTES
[facility]  Hyperbaric Oxygen Therapy   Progress Note      NAME: [address] [place] [address] Road RECORD NUMBER:  [id number]  AGE: 36 y.o. GENDER: male  : 1980  EPISODE DATE:  2021     Subjective     HBO Treatment Number: 14 out of Total Treatments: 30    HBO Diagnosis:     Indications: Lower Extremity Diabetic Wound ___(site)(right foot)  Shea Valentin 3     Safety checks performed prior to treatment. See doc flowsheets for documentation.     Objective           Recent Labs     21  0841 21  1033   POCGLU 167* 110*       Pre treatment Vital Signs       Temp: 98.3 °F (36.8 °C)     Pulse: 97     Resp: 16     BP: 119/82     Blood Sugar 167    Post treatment Vital Signs  Temp: 97.4 °F (36.3 °C)  Pulse: 98  Resp: 20  BP: (!) 132/91(Will inform )  Blood Sugar 110    Assessment        Physical Exam:  General Appearance:  alert and oriented to person, place and time, well-developed and well-nourished, in no acute distress and alert and oriented to person, place and time    Pre Tympanic Membrane Assessment:  tympanic membranes intact bilaterally, normal color    Post Tympanic Membrane Assessment:  Right: Normal(per Pt.)  Left: Normal(Per Pt.)    Pulmonary/Chest:  clear to auscultation bilaterally- no wheezes, rales or rhonchi, normal air movement, no respiratory distress and no chest wall tenderness    Cardiovascular:  normal, regular rate and rhythm    Chamber #: [id number]       Treatment Start Time: 0847     Pressure Reached Time: 0902  WALESKA : 2  Number of Air Breaks:  Treatment Status: Other (Comment)(Pt. applied air mask on then requested to come out of the chamber due to anxiety)      Decompression Time: 1507   Treatment End Time: 1005    Symptoms Noted During Treatment: Other (Comment)(see above)    TOTAL TIME AT DEPTH-51    TOTAL TIME IN CHAMBER-78    Adverse Event: yes  clautrophobia    I was present on these premises and immediately available to furnish assistance & direction throughout the procedure. Plan          Gita Jiang is a 36 y.o. male  did not successfully complete today's hyperbaric oxygen treatment at [address] [place] [address]. In my clinical judgement, ongoing HBO therapy is  necessary at this time, given a threat to patient function, limb or life from the current condition. Supervision and attendance of Hyperbaric Oxygen Therapy provided. Continue HBO treatment as outlined in the treatment plan. Hyperbaric Oxygen: Gita Jiang tolerated Treatment Number: 15 well today without complications.      Electronically signed by Juliette Villalobos MD on 2/25/2021 at 12:16 PM

## 2021-02-26 ENCOUNTER — HOSPITAL ENCOUNTER (OUTPATIENT)
Dept: HYPERBARIC MEDICINE | Age: 41
Discharge: HOME | End: 2021-02-26
Payer: COMMERCIAL

## 2021-02-26 VITALS
TEMPERATURE: 98.2 F | DIASTOLIC BLOOD PRESSURE: 93 MMHG | RESPIRATION RATE: 24 BRPM | SYSTOLIC BLOOD PRESSURE: 145 MMHG | HEART RATE: 86 BPM

## 2021-02-26 DIAGNOSIS — E11.621 DIABETIC ULCER OF RIGHT MIDFOOT ASSOCIATED WITH TYPE 2 DIABETES MELLITUS, WITH NECROSIS OF BONE (HCC): Primary | ICD-10-CM

## 2021-02-26 DIAGNOSIS — L97.414 DIABETIC ULCER OF RIGHT MIDFOOT ASSOCIATED WITH TYPE 2 DIABETES MELLITUS, WITH NECROSIS OF BONE (HCC): Primary | ICD-10-CM

## 2021-02-26 LAB
GLUCOSE BLD-MCNC: 150 MG/DL (ref 70–99)
GLUCOSE BLD-MCNC: 98 MG/DL (ref 70–99)

## 2021-02-26 PROCEDURE — G0277 HBOT, FULL BODY CHAMBER, 30M: HCPCS

## 2021-02-26 PROCEDURE — 82962 GLUCOSE BLOOD TEST: CPT

## 2021-02-26 NOTE — PROGRESS NOTES
[facility]  Hyperbaric Oxygen Therapy   Progress Note      NAME: Gita Jiang   MEDICAL RECORD NUMBER:  [id number]  AGE: 36 y.o. GENDER: male  : 1980  EPISODE DATE:  2021     Subjective     HBO Treatment Number: 15 out of Total Treatments: 30    HBO Diagnosis:     Indications: Lower Extremity Diabetic Wound ___(site)(Right foot)  Nikolai Rice 3     Safety checks performed prior to treatment. See doc flowsheets for documentation. Objective           Recent Labs     21  1513 21  1725   POCGLU 150* 98       Pre treatment Vital Signs       Temp: 98.9 °F (37.2 °C)     Pulse: 90     Resp: 16     BP: 125/79       Post treatment Vital Signs  Temp: 98.2 °F (36.8 °C)  Pulse: 86  Resp: 24  BP: (!) 145/93(Dr. Raya Vázquez will be informed)      Assessment        Physical Exam:  General Appearance:  alert and oriented to person, place and time, well-developed and well-nourished, in no acute distress    Pre Tympanic Membrane Assessment:  tympanic membranes intact bilaterally    Post Tympanic Membrane Assessment:  Right: Normal(per Pt.)  Left: Normal(per Pt.)    Pulmonary/Chest:  clear to auscultation bilaterally- no wheezes, rales or rhonchi, normal air movement, no respiratory distress    Cardiovascular:  regular rate and rhythm    Chamber #: [id number]       Treatment Start Time: 1531(Treatment started late because of lengthy change over)     Pressure Reached Time: 1545  WALESKA : 2  Number of Air Breaks:  Treatment Status: Other (Comment)(Pt. used air mask x 1 during treatment due to becoming anxious)      Decompression Time: 1653   Treatment End Time: 1709    Symptoms Noted During Treatment: Other (Comment)(see above)    Adverse Event: no      I was present on these premises and immediately available to furnish assistance & direction throughout the procedure.        Plan          Gita Jiang is a 36 y.o. male  did successfully complete today's hyperbaric oxygen treatment at [address] In my clinical judgement, ongoing HBO therapy is  necessary at this time, given a threat to patient function, limb or life from the current condition. Supervision and attendance of Hyperbaric Oxygen Therapy provided. Continue HBO treatment as outlined in the treatment plan. Hyperbaric Oxygen: [place] Hijose alfredo tolerated Treatment Number: 15 well today without complications.      Electronically signed by Alan Ramirez MD on 2/26/2021 at 5:50 PM

## 2021-03-01 ENCOUNTER — HOSPITAL ENCOUNTER (OUTPATIENT)
Dept: HYPERBARIC MEDICINE | Age: 41
Discharge: HOME | End: 2021-03-01
Payer: COMMERCIAL

## 2021-03-01 VITALS
DIASTOLIC BLOOD PRESSURE: 79 MMHG | TEMPERATURE: 98.7 F | RESPIRATION RATE: 16 BRPM | SYSTOLIC BLOOD PRESSURE: 115 MMHG | HEART RATE: 90 BPM

## 2021-03-01 DIAGNOSIS — E11.621 DIABETIC ULCER OF RIGHT MIDFOOT ASSOCIATED WITH TYPE 2 DIABETES MELLITUS, WITH NECROSIS OF BONE (HCC): Primary | ICD-10-CM

## 2021-03-01 DIAGNOSIS — L97.414 DIABETIC ULCER OF RIGHT MIDFOOT ASSOCIATED WITH TYPE 2 DIABETES MELLITUS, WITH NECROSIS OF BONE (HCC): Primary | ICD-10-CM

## 2021-03-01 LAB
GLUCOSE BLD-MCNC: 135 MG/DL (ref 70–99)
GLUCOSE BLD-MCNC: 233 MG/DL (ref 70–99)

## 2021-03-01 PROCEDURE — 99183 HYPERBARIC OXYGEN THERAPY: CPT | Performed by: NURSE PRACTITIONER

## 2021-03-01 PROCEDURE — 82962 GLUCOSE BLOOD TEST: CPT

## 2021-03-01 PROCEDURE — G0277 HBOT, FULL BODY CHAMBER, 30M: HCPCS

## 2021-03-01 NOTE — PROGRESS NOTES
[facility] [place]  Hyperbaric Oxygen Therapy   Progress Note      NAME: [address] [place] [address] RECORD NUMBER:  [id number]  AGE: 36 y.o. GENDER: male  : 1980  EPISODE DATE:  3/1/2021     Subjective     HBO Treatment Number: 16 out of Total Treatments: 30    HBO Diagnosis:     Indications: Lower Extremity Diabetic Wound ___(site)(right foot)  Brea Rico 3     Safety checks performed prior to treatment. See doc flowsheets for documentation. Objective           Recent Labs     21  0806 21  1025   POCGLU 233* 135*       Pre treatment Vital Signs       Temp: 98 °F (36.7 °C)     Pulse: 105     Resp: 16     BP: 139/83     Blood Sugar 233    Post treatment Vital Signs  Temp: 98.7 °F (37.1 °C)  Pulse: 90  Resp: 16  BP: 115/79  Blood Sugar 135    Assessment        Physical Exam:  General Appearance:  alert and oriented to person, place and time, well-developed and well-nourished, in no acute distress    Pre Tympanic Membrane Assessment:  tympanic membranes intact bilaterally    Post Tympanic Membrane Assessment:  Right: Normal(per Pt.)  Left: Normal(per Pt.)    Pulmonary/Chest:  clear to auscultation bilaterally- no wheezes, rales or rhonchi, normal air movement, no respiratory distress    Cardiovascular:  normal, regular rate and rhythm    Chamber #: [id number]       Treatment Start Time: 0820     Pressure Reached Time: 0838  WALESKA : 2  Number of Air Breaks:  Treatment Status: No Air break(Pt. did not use air mask today)      Decompression Time: 0946   Treatment End Time: 1001    Symptoms Noted During Treatment: None    Adverse Event: no      Total time in chamber: 101  Minutes at depth:68    I was present on these premises and immediately available to furnish assistance & direction throughout the procedure. Danica Inman is a 36 y.o. male  did successfully complete today's hyperbaric oxygen treatment at [facility].     In my clinical judgement, ongoing HBO therapy is  necessary at this time, given a threat to patient function, limb or life from the current condition. Supervision and attendance of Hyperbaric Oxygen Therapy provided. Continue HBO treatment as outlined in the treatment plan. Hyperbaric Oxygen: [place] tolerated Treatment Number: 12 well today without complications.      Electronically signed by [employer], APRN - CNP on 3/1/2021 at 12:51 PM

## 2021-03-02 ENCOUNTER — HOSPITAL ENCOUNTER (OUTPATIENT)
Dept: HYPERBARIC MEDICINE | Age: 41
Discharge: HOME | End: 2021-03-02
Payer: COMMERCIAL

## 2021-03-02 VITALS
DIASTOLIC BLOOD PRESSURE: 103 MMHG | SYSTOLIC BLOOD PRESSURE: 161 MMHG | HEART RATE: 92 BPM | RESPIRATION RATE: 16 BRPM | TEMPERATURE: 97.6 F

## 2021-03-02 DIAGNOSIS — L97.414 DIABETIC ULCER OF RIGHT MIDFOOT ASSOCIATED WITH TYPE 2 DIABETES MELLITUS, WITH NECROSIS OF BONE (HCC): Primary | ICD-10-CM

## 2021-03-02 DIAGNOSIS — E11.621 DIABETIC ULCER OF RIGHT MIDFOOT ASSOCIATED WITH TYPE 2 DIABETES MELLITUS, WITH NECROSIS OF BONE (HCC): Primary | ICD-10-CM

## 2021-03-02 LAB
GLUCOSE BLD-MCNC: 124 MG/DL (ref 70–99)
GLUCOSE BLD-MCNC: 224 MG/DL (ref 70–99)

## 2021-03-02 PROCEDURE — G0277 HBOT, FULL BODY CHAMBER, 30M: HCPCS

## 2021-03-02 PROCEDURE — 82962 GLUCOSE BLOOD TEST: CPT

## 2021-03-02 PROCEDURE — 99183 HYPERBARIC OXYGEN THERAPY: CPT | Performed by: NURSE PRACTITIONER

## 2021-03-02 NOTE — PROGRESS NOTES
[facility]  Hyperbaric Oxygen Therapy   Progress Note      NAME: [address] [place] [address] RECORD NUMBER:  [id number]  AGE: 36 y.o. GENDER: male  : 1980  EPISODE DATE:  3/2/2021     Subjective     HBO Treatment Number: 17 out of Total Treatments: 30    HBO Diagnosis:     Indications: Lower Extremity Diabetic Wound ___(site)(right foot)  Arlene Calle 3     Safety checks performed prior to treatment. See doc flowsheets for documentation. Objective           Recent Labs     21  0812 21  1033   POCGLU 224* 124*       Pre treatment Vital Signs       Temp: 98.1 °F (36.7 °C)     Pulse: 98     Resp: 16     BP: 125/88     Blood Sugar 224    Post treatment Vital Signs  Temp: 97.6 °F (36.4 °C)  Pulse: 92  Resp: 16  BP: (!) 161/103(SN will inform SERENA Tejada.  Pt. slightly emotional post treatment; emotional support given.)  Blood Sugar 124    Assessment        Physical Exam:  General Appearance:  alert and oriented to person, place and time, well-developed and well-nourished, in no acute distress    Pre Tympanic Membrane Assessment:  tympanic membranes intact bilaterally    Post Tympanic Membrane Assessment:  Right: Normal(per Pt.)  Left: Normal(per Pt.)    Pulmonary/Chest:  clear to auscultation bilaterally- no wheezes, rales or rhonchi, normal air movement, no respiratory distress    Cardiovascular:  normal, regular rate and rhythm    Chamber #: [id number]       Treatment Start Time: 0820     Pressure Reached Time: 0836  WALESKA : 2  Number of Air Breaks:  Treatment Status: Other (Comment)(Pt. used air mask x 1 during Tx due to anxiety,helped; Providers aware  patients uses air mask ; using less and tolerating treatments better)      Decompression Time: 1006   Treatment End Time: 1022    Symptoms Noted During Treatment: Other (Comment)(see above)    Adverse Event: no      Total time in chamber: 122  Minutes at depth: 90    I was present on these premises and immediately available to furnish assistance & direction throughout the procedure. Plan          Gita Jiang is a 36 y.o. male  did successfully complete today's hyperbaric oxygen treatment at [address] [place] [address]. In my clinical judgement, ongoing HBO therapy is  necessary at this time, given a threat to patient function, limb or life from the current condition. Supervision and attendance of Hyperbaric Oxygen Therapy provided. Continue HBO treatment as outlined in the treatment plan. Hyperbaric Oxygen: Gita Jiang tolerated Treatment Number: 16 well today without complications.      Electronically signed by RAFAEL Tavares CNP on 3/2/2021 at 6:00 PM

## 2021-03-03 ENCOUNTER — HOSPITAL ENCOUNTER (OUTPATIENT)
Dept: HYPERBARIC MEDICINE | Age: 41
Discharge: HOME | End: 2021-03-03
Payer: COMMERCIAL

## 2021-03-03 VITALS
DIASTOLIC BLOOD PRESSURE: 84 MMHG | HEART RATE: 91 BPM | TEMPERATURE: 98.2 F | RESPIRATION RATE: 16 BRPM | SYSTOLIC BLOOD PRESSURE: 121 MMHG

## 2021-03-03 DIAGNOSIS — L97.414 DIABETIC ULCER OF RIGHT MIDFOOT ASSOCIATED WITH TYPE 2 DIABETES MELLITUS, WITH NECROSIS OF BONE (HCC): Primary | ICD-10-CM

## 2021-03-03 DIAGNOSIS — E11.621 DIABETIC ULCER OF RIGHT MIDFOOT ASSOCIATED WITH TYPE 2 DIABETES MELLITUS, WITH NECROSIS OF BONE (HCC): Primary | ICD-10-CM

## 2021-03-03 LAB
GLUCOSE BLD-MCNC: 123 MG/DL (ref 70–99)
GLUCOSE BLD-MCNC: 250 MG/DL (ref 70–99)

## 2021-03-03 PROCEDURE — G0277 HBOT, FULL BODY CHAMBER, 30M: HCPCS

## 2021-03-03 PROCEDURE — 82962 GLUCOSE BLOOD TEST: CPT

## 2021-03-03 NOTE — PROGRESS NOTES
complete today's hyperbaric oxygen treatment at [address] [place] [address] Ruth Dao. In my clinical judgement, ongoing HBO therapy is  necessary at this time, given a threat to patient function, limb or life from the current condition. Supervision and attendance of Hyperbaric Oxygen Therapy provided. Continue HBO treatment as outlined in the treatment plan. Hyperbaric Oxygen: Chano Dickerson tolerated Treatment Number: 25 well today without complications.      Electronically signed by Doyle Perez MD on 3/3/2021 at 12:56 PM

## 2021-03-04 ENCOUNTER — HOSPITAL ENCOUNTER (OUTPATIENT)
Dept: HYPERBARIC MEDICINE | Age: 41
Discharge: HOME | End: 2021-03-04
Payer: COMMERCIAL

## 2021-03-04 VITALS
SYSTOLIC BLOOD PRESSURE: 129 MMHG | HEART RATE: 92 BPM | TEMPERATURE: 97.9 F | RESPIRATION RATE: 16 BRPM | DIASTOLIC BLOOD PRESSURE: 86 MMHG

## 2021-03-04 DIAGNOSIS — E11.621 DIABETIC ULCER OF RIGHT MIDFOOT ASSOCIATED WITH TYPE 2 DIABETES MELLITUS, WITH NECROSIS OF BONE (HCC): Primary | ICD-10-CM

## 2021-03-04 DIAGNOSIS — L97.414 DIABETIC ULCER OF RIGHT MIDFOOT ASSOCIATED WITH TYPE 2 DIABETES MELLITUS, WITH NECROSIS OF BONE (HCC): Primary | ICD-10-CM

## 2021-03-04 LAB
GLUCOSE BLD-MCNC: 147 MG/DL (ref 70–99)
GLUCOSE BLD-MCNC: 245 MG/DL (ref 70–99)

## 2021-03-04 PROCEDURE — 82962 GLUCOSE BLOOD TEST: CPT

## 2021-03-04 PROCEDURE — G0277 HBOT, FULL BODY CHAMBER, 30M: HCPCS

## 2021-03-04 NOTE — PROGRESS NOTES
[facility]  Hyperbaric Oxygen Therapy   Progress Note      NAME: [address] [place] [address] RECORD NUMBER:  [id number]  AGE: 36 y.o. GENDER: male  : 1980  EPISODE DATE:  3/4/2021     Subjective     HBO Treatment Number: 19 out of Total Treatments: 30    HBO Diagnosis:     Indications: Lower Extremity Diabetic Wound ___(site)(DWLE-right foot)  Dora Ponce 3     Safety checks performed prior to treatment. See doc flowsheets for documentation.     Objective           Recent Labs     21  0759 21  1029   POCGLU 245* 147*       Pre treatment Vital Signs       Temp: 97.8 °F (36.6 °C)     Pulse: 98     Resp: 16     BP: 131/88     Blood Sugar 245    Post treatment Vital Signs  Temp: 97.9 °F (36.6 °C)  Pulse: 92  Resp: 16  BP: 129/86  Blood Sugar 147    Assessment        Physical Exam:  General Appearance:  alert and oriented to person, place and time, well-developed and well-nourished, in no acute distress, alert and oriented to person, place and time, well-developed and well nourished, in no acute distress and alert    Pre Tympanic Membrane Assessment:  tympanic membranes intact bilaterally, normal color    Post Tympanic Membrane Assessment:  Right: Normal(per Pt.)  Left: Normal(per Pt.)    Pulmonary/Chest:  clear to auscultation bilaterally- no wheezes, rales or rhonchi, normal air movement, no respiratory distress and no chest wall tenderness    Cardiovascular:  normal, regular rate and rhythm    Chamber #: [id number]       Treatment Start Time: 0819     Pressure Reached Time: 0835  WALESKA : 2  Number of Air Breaks:  Treatment Status: Other (Comment)(Pt. did not use air mask this treatment; tolerated treatment well)      Decompression Time: 1005   Treatment End Time: 1021    Symptoms Noted During Treatment: None    TOTAL TIME AT DEPTH-90    TOTAL TIME IN CHAMBER-122    Adverse Event: no      I was present on these premises and immediately available to furnish assistance & direction throughout the procedure. Plan          Aayush Hong is a 36 y.o. male  did successfully complete today's hyperbaric oxygen treatment at [address] [place] [address]. In my clinical judgement, ongoing HBO therapy is  necessary at this time, given a threat to patient function, limb or life from the current condition. Supervision and attendance of Hyperbaric Oxygen Therapy provided. Continue HBO treatment as outlined in the treatment plan. Hyperbaric Oxygen: Aayush Hong tolerated Treatment Number: 23 well today without complications.      Electronically signed by Angelica Chiu MD on 3/4/2021 at 11:52 AM

## 2021-03-05 ENCOUNTER — HOSPITAL ENCOUNTER (OUTPATIENT)
Dept: HYPERBARIC MEDICINE | Age: 41
Discharge: HOME | End: 2021-03-05
Payer: COMMERCIAL

## 2021-03-05 VITALS
RESPIRATION RATE: 16 BRPM | SYSTOLIC BLOOD PRESSURE: 96 MMHG | DIASTOLIC BLOOD PRESSURE: 70 MMHG | HEART RATE: 101 BPM | TEMPERATURE: 98.5 F

## 2021-03-05 DIAGNOSIS — L97.414 DIABETIC ULCER OF RIGHT MIDFOOT ASSOCIATED WITH TYPE 2 DIABETES MELLITUS, WITH NECROSIS OF BONE (HCC): Primary | ICD-10-CM

## 2021-03-05 DIAGNOSIS — E11.621 DIABETIC ULCER OF RIGHT MIDFOOT ASSOCIATED WITH TYPE 2 DIABETES MELLITUS, WITH NECROSIS OF BONE (HCC): Primary | ICD-10-CM

## 2021-03-05 LAB
GLUCOSE BLD-MCNC: 122 MG/DL (ref 70–99)
GLUCOSE BLD-MCNC: 202 MG/DL (ref 70–99)

## 2021-03-05 PROCEDURE — 82962 GLUCOSE BLOOD TEST: CPT

## 2021-03-05 PROCEDURE — G0277 HBOT, FULL BODY CHAMBER, 30M: HCPCS

## 2021-03-05 NOTE — PROGRESS NOTES
[facility] [place]  Hyperbaric Oxygen Therapy   Progress Note      NAME: [address] [place] [address] RECORD NUMBER:  [id number]  AGE: 36 y.o. GENDER: male  : 1980  EPISODE DATE:  3/5/2021     Subjective     HBO Treatment Number: 20 out of Total Treatments: 30    HBO Diagnosis:     Indications: Lower Extremity Diabetic Wound ___(site)(right foot)  Kiel Benjamin Dallas 3     Safety checks performed prior to treatment. See doc flowsheets for documentation. Objective           Recent Labs     21  0759 21  1028   POCGLU 202* 122*       Pre treatment Vital Signs       Temp: 98 °F (36.7 °C)     Pulse: 89     Resp: 16     BP: 112/71     Blood Sugar 202    Post treatment Vital Signs  Temp: 98.5 °F (36.9 °C)  Pulse: 101  Resp: 16  BP: 96/70  Blood Sugar 122    Assessment        Physical Exam:  General Appearance:  alert and oriented to person, place and time, well-developed and well-nourished, in no acute distress and alert and oriented to person, place and time    Pre Tympanic Membrane Assessment:  tympanic membranes intact bilaterally, normal color    Post Tympanic Membrane Assessment:  Right: Normal(per Pt.)  Left: Normal(per Pt.)    Pulmonary/Chest:  clear to auscultation bilaterally- no wheezes, rales or rhonchi, normal air movement, no respiratory distress and no chest wall tenderness    Cardiovascular:  normal, regular rate and rhythm    Chamber #: [id number]       Treatment Start Time: 0809     Pressure Reached Time: 0820  WALESKA : 2  Number of Air Breaks:  Treatment Status: No Air break      Decompression Time: 0950   Treatment End Time: 1006    Symptoms Noted During Treatment: None    Adverse Event: no      I was present on these premises and immediately available to furnish assistance & direction throughout the procedure. Danica Rios is a 36 y.o. male  did successfully complete today's hyperbaric oxygen treatment at [address] [place] [address] [employer].     In my clinical judgement, ongoing HBO therapy is  necessary at this time, given a threat to patient function, limb or life from the current condition. Supervision and attendance of Hyperbaric Oxygen Therapy provided. Continue HBO treatment as outlined in the treatment plan. Hyperbaric Oxygen: Gita Jiang tolerated Treatment Number: 20 well today without complications.      Electronically signed by RAFAEL Hayward CNP on 3/5/2021 at 12:28 PM

## 2021-03-08 ENCOUNTER — HOSPITAL ENCOUNTER (OUTPATIENT)
Dept: HYPERBARIC MEDICINE | Age: 41
Discharge: HOME | End: 2021-03-08
Payer: COMMERCIAL

## 2021-03-08 VITALS
RESPIRATION RATE: 18 BRPM | TEMPERATURE: 97.4 F | DIASTOLIC BLOOD PRESSURE: 89 MMHG | HEART RATE: 97 BPM | SYSTOLIC BLOOD PRESSURE: 130 MMHG

## 2021-03-08 DIAGNOSIS — L97.414 DIABETIC ULCER OF RIGHT MIDFOOT ASSOCIATED WITH TYPE 2 DIABETES MELLITUS, WITH NECROSIS OF BONE (HCC): Primary | ICD-10-CM

## 2021-03-08 DIAGNOSIS — E11.621 DIABETIC ULCER OF RIGHT MIDFOOT ASSOCIATED WITH TYPE 2 DIABETES MELLITUS, WITH NECROSIS OF BONE (HCC): Primary | ICD-10-CM

## 2021-03-08 LAB
GLUCOSE BLD-MCNC: 125 MG/DL (ref 70–99)
GLUCOSE BLD-MCNC: 208 MG/DL (ref 70–99)

## 2021-03-08 PROCEDURE — 82962 GLUCOSE BLOOD TEST: CPT

## 2021-03-08 PROCEDURE — G0277 HBOT, FULL BODY CHAMBER, 30M: HCPCS

## 2021-03-08 PROCEDURE — 99183 HYPERBARIC OXYGEN THERAPY: CPT | Performed by: NURSE PRACTITIONER

## 2021-03-08 NOTE — PROGRESS NOTES
[facility]  Hyperbaric Oxygen Therapy   Progress Note      NAME: [address] [place] [address] RECORD NUMBER:  [id number]  AGE: 36 y.o. GENDER: male  : 1980  EPISODE DATE:  3/8/2021     Subjective     HBO Treatment Number: 21 out of Total Treatments: 30    HBO Diagnosis:     Indications: Lower Extremity Diabetic Wound ___(site)(right foot)  Juana Jason Saucedo 3     Safety checks performed prior to treatment. See doc flowsheets for documentation. Objective           Recent Labs     21  0803 21  1015   POCGLU 208* 125*       Pre treatment Vital Signs       Temp: 98.2 °F (36.8 °C)     Pulse: 102     Resp: 16     BP: 119/86     Blood Sugar 208    Post treatment Vital Signs  Temp: 97.4 °F (36.3 °C)  Pulse: 97  Resp: 18  BP: 130/89  Blood Sugar 125    Assessment        Physical Exam:  General Appearance:  alert and oriented to person, place and time, well-developed and well-nourished, in no acute distress    Pre Tympanic Membrane Assessment:  tympanic membranes intact bilaterally    Post Tympanic Membrane Assessment:  Right: Normal(per Pt.)  Left: Normal(per Pt.)    Pulmonary/Chest:  clear to auscultation bilaterally- no wheezes, rales or rhonchi, normal air movement, no respiratory distress    Cardiovascular:  normal, regular rate and rhythm    Chamber #: [id number]       Treatment Start Time: 0813     Pressure Reached Time: 0824  WALESKA : 2  Number of Air Breaks:  Treatment Status: No Air break      Decompression Time: 0954   Treatment End Time: 1010    Symptoms Noted During Treatment: None    Adverse Event: no      Total time in chamber: 117  Minutes at depth: 90    I was present on these premises and immediately available to furnish assistance & direction throughout the procedure. Danica          Rafa Esquivel is a 36 y.o. male  did successfully complete today's hyperbaric oxygen treatment at [address] [place] [address] [employer].     In my clinical judgement, ongoing HBO therapy is  necessary at this time, given a threat to patient function, limb or life from the current condition. Supervision and attendance of Hyperbaric Oxygen Therapy provided. Continue HBO treatment as outlined in the treatment plan. Hyperbaric Oxygen: Rafa Esquivel tolerated Treatment Number: 21 well today without complications.      Electronically signed by RAFAEL Coyle CNP on 3/8/2021 at 5:42 PM

## 2021-03-09 ENCOUNTER — HOSPITAL ENCOUNTER (OUTPATIENT)
Dept: HYPERBARIC MEDICINE | Age: 41
Discharge: HOME | End: 2021-03-09
Payer: COMMERCIAL

## 2021-03-09 VITALS
RESPIRATION RATE: 16 BRPM | SYSTOLIC BLOOD PRESSURE: 123 MMHG | HEART RATE: 92 BPM | TEMPERATURE: 98.3 F | DIASTOLIC BLOOD PRESSURE: 82 MMHG

## 2021-03-09 DIAGNOSIS — E11.621 DIABETIC ULCER OF RIGHT MIDFOOT ASSOCIATED WITH TYPE 2 DIABETES MELLITUS, WITH NECROSIS OF BONE (HCC): Primary | ICD-10-CM

## 2021-03-09 DIAGNOSIS — L97.414 DIABETIC ULCER OF RIGHT MIDFOOT ASSOCIATED WITH TYPE 2 DIABETES MELLITUS, WITH NECROSIS OF BONE (HCC): Primary | ICD-10-CM

## 2021-03-09 LAB
GLUCOSE BLD-MCNC: 110 MG/DL (ref 70–99)
GLUCOSE BLD-MCNC: 189 MG/DL (ref 70–99)

## 2021-03-09 PROCEDURE — G0277 HBOT, FULL BODY CHAMBER, 30M: HCPCS

## 2021-03-09 PROCEDURE — 82962 GLUCOSE BLOOD TEST: CPT

## 2021-03-09 PROCEDURE — 99183 HYPERBARIC OXYGEN THERAPY: CPT | Performed by: NURSE PRACTITIONER

## 2021-03-09 NOTE — PROGRESS NOTES
[facility] [place]  Hyperbaric Oxygen Therapy   Progress Note      NAME: [address] [place] [address] RECORD NUMBER:  [id number]  AGE: 36 y.o. GENDER: male  : 1980  EPISODE DATE:  3/9/2021     Subjective     HBO Treatment Number: 22 out of Total Treatments: 30    HBO Diagnosis:     Indications: Lower Extremity Diabetic Wound ___(site)(right foot)  Shea Valentin 3     Safety checks performed prior to treatment. See doc flowsheets for documentation. Objective           Recent Labs     21  0759 21  1028   POCGLU 189* 110*       Pre treatment Vital Signs       Temp: 98.5 °F (36.9 °C)     Pulse: 101     Resp: 16     BP: 119/83     Blood Sugar 189    Post treatment Vital Signs  Temp: 98.3 °F (36.8 °C)  Pulse: 92  Resp: 16  BP: 123/82  Blood Sugar 110    Assessment        Physical Exam:  General Appearance:  alert and oriented to person, place and time, well-developed and well-nourished, in no acute distress    Pre Tympanic Membrane Assessment:  tympanic membranes intact bilaterally    Post Tympanic Membrane Assessment:  Right: Normal(per Pt.)  Left: Normal(Per Pt.)    Pulmonary/Chest:  clear to auscultation bilaterally- no wheezes, rales or rhonchi, normal air movement, no respiratory distress    Cardiovascular:  normal, regular rate and rhythm    Chamber #: [id number]       Treatment Start Time: 0808     Pressure Reached Time: 0821  WALESKA : 2  Number of Air Breaks:  Treatment Status: No Air break      Decompression Time: 0951   Treatment End Time: 1007    Symptoms Noted During Treatment: None    Adverse Event: no    Total time in chamber:119  Minutes at depth: 90    I was present on these premises and immediately available to furnish assistance & direction throughout the procedure. Danica          Chapis Felix is a 36 y.o. male  did successfully complete today's hyperbaric oxygen treatment at [address] [place] [address] [facility].     In my clinical judgement, ongoing HBO therapy is  necessary at this time, given a threat to patient function, limb or life from the current condition. Supervision and attendance of Hyperbaric Oxygen Therapy provided. Continue HBO treatment as outlined in the treatment plan. Hyperbaric Oxygen: Gita Jiang tolerated Treatment Number: 25 well today without complications.      Electronically signed by RAFAEL Tavares CNP on 3/9/2021 at 5:36 PM

## 2021-03-10 ENCOUNTER — HOSPITAL ENCOUNTER (OUTPATIENT)
Dept: HYPERBARIC MEDICINE | Age: 41
Discharge: HOME | End: 2021-03-10
Payer: COMMERCIAL

## 2021-03-10 ENCOUNTER — OFFICE VISIT (OUTPATIENT)
Dept: FAMILY MEDICINE CLINIC | Age: 41
End: 2021-03-10
Payer: COMMERCIAL

## 2021-03-10 VITALS
DIASTOLIC BLOOD PRESSURE: 76 MMHG | SYSTOLIC BLOOD PRESSURE: 117 MMHG | HEART RATE: 92 BPM | TEMPERATURE: 97.5 F | RESPIRATION RATE: 16 BRPM

## 2021-03-10 VITALS — DIASTOLIC BLOOD PRESSURE: 68 MMHG | TEMPERATURE: 97.5 F | SYSTOLIC BLOOD PRESSURE: 110 MMHG | HEART RATE: 98 BPM

## 2021-03-10 DIAGNOSIS — I10 ESSENTIAL HYPERTENSION: Primary | ICD-10-CM

## 2021-03-10 DIAGNOSIS — E11.621 DIABETIC ULCER OF RIGHT MIDFOOT ASSOCIATED WITH TYPE 2 DIABETES MELLITUS, WITH FAT LAYER EXPOSED (HCC): ICD-10-CM

## 2021-03-10 DIAGNOSIS — E11.621 DIABETIC ULCER OF RIGHT MIDFOOT ASSOCIATED WITH TYPE 2 DIABETES MELLITUS, WITH NECROSIS OF BONE (HCC): Primary | ICD-10-CM

## 2021-03-10 DIAGNOSIS — L97.414 DIABETIC ULCER OF RIGHT MIDFOOT ASSOCIATED WITH TYPE 2 DIABETES MELLITUS, WITH NECROSIS OF BONE (HCC): Primary | ICD-10-CM

## 2021-03-10 DIAGNOSIS — L97.412 DIABETIC ULCER OF RIGHT MIDFOOT ASSOCIATED WITH TYPE 2 DIABETES MELLITUS, WITH FAT LAYER EXPOSED (HCC): ICD-10-CM

## 2021-03-10 DIAGNOSIS — F33.2 SEVERE EPISODE OF RECURRENT MAJOR DEPRESSIVE DISORDER, WITHOUT PSYCHOTIC FEATURES (HCC): ICD-10-CM

## 2021-03-10 PROBLEM — I76 SEPTIC EMBOLISM (HCC): Status: RESOLVED | Noted: 2020-06-13 | Resolved: 2021-03-10

## 2021-03-10 LAB
GLUCOSE BLD-MCNC: 150 MG/DL (ref 70–99)
GLUCOSE BLD-MCNC: 261 MG/DL (ref 70–99)

## 2021-03-10 PROCEDURE — 1036F TOBACCO NON-USER: CPT | Performed by: FAMILY MEDICINE

## 2021-03-10 PROCEDURE — G0277 HBOT, FULL BODY CHAMBER, 30M: HCPCS

## 2021-03-10 PROCEDURE — 3051F HG A1C>EQUAL 7.0%<8.0%: CPT | Performed by: FAMILY MEDICINE

## 2021-03-10 PROCEDURE — G8482 FLU IMMUNIZE ORDER/ADMIN: HCPCS | Performed by: FAMILY MEDICINE

## 2021-03-10 PROCEDURE — G8427 DOCREV CUR MEDS BY ELIG CLIN: HCPCS | Performed by: FAMILY MEDICINE

## 2021-03-10 PROCEDURE — 82962 GLUCOSE BLOOD TEST: CPT

## 2021-03-10 PROCEDURE — 99214 OFFICE O/P EST MOD 30 MIN: CPT | Performed by: FAMILY MEDICINE

## 2021-03-10 PROCEDURE — 2022F DILAT RTA XM EVC RTNOPTHY: CPT | Performed by: FAMILY MEDICINE

## 2021-03-10 PROCEDURE — G8420 CALC BMI NORM PARAMETERS: HCPCS | Performed by: FAMILY MEDICINE

## 2021-03-10 RX ORDER — FLUOXETINE HYDROCHLORIDE 20 MG/1
20 CAPSULE ORAL DAILY
Qty: 30 CAPSULE | Refills: 3 | Status: SHIPPED | OUTPATIENT
Start: 2021-03-10 | End: 2021-04-07 | Stop reason: SDUPTHER

## 2021-03-10 ASSESSMENT — PATIENT HEALTH QUESTIONNAIRE - PHQ9
3. TROUBLE FALLING OR STAYING ASLEEP: 3
5. POOR APPETITE OR OVEREATING: 3
8. MOVING OR SPEAKING SO SLOWLY THAT OTHER PEOPLE COULD HAVE NOTICED. OR THE OPPOSITE, BEING SO FIGETY OR RESTLESS THAT YOU HAVE BEEN MOVING AROUND A LOT MORE THAN USUAL: 0
SUM OF ALL RESPONSES TO PHQ9 QUESTIONS 1 & 2: 6
SUM OF ALL RESPONSES TO PHQ QUESTIONS 1-9: 22
SUM OF ALL RESPONSES TO PHQ QUESTIONS 1-9: 22
9. THOUGHTS THAT YOU WOULD BE BETTER OFF DEAD, OR OF HURTING YOURSELF: 1
10. IF YOU CHECKED OFF ANY PROBLEMS, HOW DIFFICULT HAVE THESE PROBLEMS MADE IT FOR YOU TO DO YOUR WORK, TAKE CARE OF THINGS AT HOME, OR GET ALONG WITH OTHER PEOPLE: 3

## 2021-03-10 ASSESSMENT — COLUMBIA-SUICIDE SEVERITY RATING SCALE - C-SSRS: 1. WITHIN THE PAST MONTH, HAVE YOU WISHED YOU WERE DEAD OR WISHED YOU COULD GO TO SLEEP AND NOT WAKE UP?: YES

## 2021-03-10 NOTE — PLAN OF CARE
Problem: Physical Regulation:  Intervention: Assess for signs and symptoms of adverse events including confinement anxiety, pneumothorax, oxygen toxicity, and barotrauma  Note: See flow sheet  Intervention: Assess knowledge and expectations of HBO therapy  Note: See flow sheet  Intervention: Assess for history of confinement anxiety  Note: See flow sheet  Intervention: Notify health care provider regarding significant changes in patient's condition  Note: See flow sheet  Intervention: Provide comfort related to the HBO environment and equalizaton of middle ear  Note: See flow sheet  Intervention: Monitor for symptoms of seizure  Note: See flow sheet

## 2021-03-10 NOTE — PROGRESS NOTES
3/10/2021    Chapis Felix    Chief Complaint   Patient presents with    Depression     see screening       HPI    Porsche Loera is a 36 y.o. male who presents today with follow-up. Patient notes significant depression. He feels he is not suicidal as he needs to be around for his mother. Patient is not been on medications before but is willing to try now. Patient had a transmetatarsal amputation of his foot due to foot infection related to severe calluses under his metatarsal heads with probable infection.     REVIEW OF SYSTEMS    Constitutional:  Denies fever, chills, weight loss or weakness  Eyes:  no photophobia or discharge  ENT:  no sore throat or ear pain  Cardiovascular:  Denies chest pain, palpitations or swelling  Respiratory:  Denies cough or shortness of breath  GI:  no abdominal pain, nausea, vomiting, or diarrhea  Musculoskeletal:  no back pain  Skin:  No rashes  Neurologic:  no headache, focal weakness, or sensory changes  Endocrine:  no polyuria or polydipsia      PAST MEDICAL HISTORY  Past Medical History:   Diagnosis Date    Callus of foot     Right foot - see's Dr. Ree Woodruff Diabetic ulcer of right midfoot associated with type 2 diabetes mellitus, with fat layer exposed ([address] 8/11/2020    Dizziness     positional    Essential hypertension     Follows with PCP    Lumbar radiculopathy     Septic embolism ([address] 6/13/2020       FAMILY HISTORY  Family History   Problem Relation Age of Onset    Heart Disease Father     Diabetes Father     Diabetes Mother        SOCIAL HISTORY  Social History     Socioeconomic History    Marital status: Single     Spouse name: Not on file    Number of children: Not on file    Years of education: Not on file    Highest education level: Not on file   Occupational History    Not on file   Social Needs    Financial resource strain: Not on file    Food insecurity     Worry: Not on file     Inability: Not on file    Transportation needs     Medical: Not on 1 tablet by mouth every 8 hours as needed for Anxiety for up to 30 days. 20 tablet 0    glyBURIDE (DIABETA) 5 MG tablet Take 2 tablets by mouth 2 times daily (with meals) 360 tablet 1    lisinopril-hydroCHLOROthiazide (PRINZIDE;ZESTORETIC) 20-12.5 MG per tablet TAKE 1 TABLET BY MOUTH ONCE DAILY 90 tablet 1    metFORMIN (GLUCOPHAGE) 500 MG tablet Take 2 tablets by mouth 2 times daily (with meals) 360 tablet 1    omeprazole (PRILOSEC) 20 MG delayed release capsule TAKE 1 CAPSULE BY MOUTH ONCE DAILY IN THE MORNING 90 capsule 1    pioglitazone (ACTOS) 15 MG tablet Take 1 tablet by mouth daily (Patient taking differently: Take 15 mg by mouth daily Pt states PRN) 30 tablet 5    blood glucose monitor kit and supplies Dispense sufficient amount for indicated testing frequency plus additional to accommodate PRN testing needs. Dispense all needed supplies to include: monitor, strips, lancing device, lancets, control solutions, alcohol swabs. 1 kit 0    blood glucose monitor strips Test 2 times a day & as needed for symptoms of irregular blood glucose. Dispense sufficient amount for indicated testing frequency plus additional to accommodate PRN testing needs. 100 strip 0    FreeStyle Lancets MISC 1 each by Does not apply route daily 100 each 3    aspirin 81 MG tablet Take 81 mg by mouth daily      acetaminophen (TYLENOL) 325 MG tablet Take 2 tablets by mouth every 4 hours as needed for Pain or Fever 120 tablet 3     No current facility-administered medications for this visit. ALLERGIES  No Known Allergies    PHYSICAL EXAM  /68   Pulse 98   Temp 97.5 °F (36.4 °C)     Patient's amputated proximal foot appears great. Suture line intact no drainage no significant erythema. ASSESSMENT & PLAN    1. Essential hypertension  Issue controlled. Continue meds. Refilled meds.     2. Diabetic ulcer of right midfoot associated with type 2 diabetes mellitus, with fat layer exposed ([address]  Ulcer currently

## 2021-03-10 NOTE — PROGRESS NOTES
[facility] AT [place]  Hyperbaric Oxygen Therapy   Progress Note      NAME: [address] [place] [address] RECORD NUMBER:  [id number]  AGE: 36 y.o. GENDER: male  : 1980  EPISODE DATE:  3/10/2021     Subjective     HBO Treatment Number: 23 out of Total Treatments: 30    HBO Diagnosis:     Indications: Lower Extremity Diabetic Wound ___(site)(right foot)  Marley GaltLuretha Males 3     Safety checks performed prior to treatment. See doc flowsheets for documentation. Objective           Recent Labs     03/10/21  0800 03/10/21  1025   POCGLU 261* 150*       Pre treatment Vital Signs       Temp: 97.9 °F (36.6 °C)     Pulse: 101     Resp: 16     BP: 123/88     Blood Sugar 261    Post treatment Vital Signs  Temp: 97.5 °F (36.4 °C)  Pulse: 92  Resp: 16  BP: 117/76  Blood Sugar 150    Assessment        Physical Exam:  General Appearance:  alert and oriented to person, place and time, well-developed and well-nourished, in no acute distress    Pre Tympanic Membrane Assessment:  tympanic membranes intact bilaterally, normal color, normal light reflex bilaterally    Post Tympanic Membrane Assessment:  Right: Normal(per Pt.)  Left: Normal(per Pt.)    Pulmonary/Chest:  clear to auscultation bilaterally- no wheezes, rales or rhonchi, normal air movement, no respiratory distress    Cardiovascular:  regular rate and rhythm, no murmurs rubs or gallops    Chamber #: [id number]       Treatment Start Time: 0807     Pressure Reached Time: 0821  WALESKA : 2  Number of Air Breaks:  Treatment Status: Other (Comment)(Pt. used air mask once during treatment today due to becoming anxious; providers are aware of patient using air mask for short intervals due to anxiety.  Pt. calmed down and removed air mask.)      Decompression Time: 0951   Treatment End Time: 1006    Symptoms Noted During Treatment: None    TOTAL TIME AT DEPTH-90    TOTAL TIME IN CHAMBER-119    Adverse Event: no      I was present on these premises and immediately available to furnish assistance & direction throughout the procedure. Plan          Aayush Hong is a 36 y.o. male  did successfully complete today's hyperbaric oxygen treatment at [address] [place] [address]. In my clinical judgement, ongoing HBO therapy is  necessary at this time, given a threat to patient function, limb or life from the current condition. Supervision and attendance of Hyperbaric Oxygen Therapy provided. Continue HBO treatment as outlined in the treatment plan. Hyperbaric Oxygen: Aayush Hong tolerated Treatment Number: 23 well today without complications.      Electronically signed by Hallie Sorensen MD on 3/10/2021 at 12:32 PM

## 2021-03-11 ENCOUNTER — HOSPITAL ENCOUNTER (OUTPATIENT)
Dept: HYPERBARIC MEDICINE | Age: 41
Discharge: HOME | End: 2021-03-11
Payer: COMMERCIAL

## 2021-03-11 VITALS
SYSTOLIC BLOOD PRESSURE: 123 MMHG | HEART RATE: 93 BPM | DIASTOLIC BLOOD PRESSURE: 88 MMHG | RESPIRATION RATE: 16 BRPM | TEMPERATURE: 97.8 F

## 2021-03-11 DIAGNOSIS — E11.621 DIABETIC ULCER OF RIGHT MIDFOOT ASSOCIATED WITH TYPE 2 DIABETES MELLITUS, WITH NECROSIS OF BONE (HCC): Primary | ICD-10-CM

## 2021-03-11 DIAGNOSIS — L97.414 DIABETIC ULCER OF RIGHT MIDFOOT ASSOCIATED WITH TYPE 2 DIABETES MELLITUS, WITH NECROSIS OF BONE (HCC): Primary | ICD-10-CM

## 2021-03-11 LAB
GLUCOSE BLD-MCNC: 133 MG/DL (ref 70–99)
GLUCOSE BLD-MCNC: 203 MG/DL (ref 70–99)

## 2021-03-11 PROCEDURE — 82962 GLUCOSE BLOOD TEST: CPT

## 2021-03-11 PROCEDURE — G0277 HBOT, FULL BODY CHAMBER, 30M: HCPCS

## 2021-03-11 NOTE — PROGRESS NOTES
[facility] AT [place]  Hyperbaric Oxygen Therapy   Progress Note      NAME: [address] [place] [address] RECORD NUMBER:  [id number]  AGE: 36 y.o. GENDER: male  : 1980  EPISODE DATE:  3/11/2021     Subjective     HBO Treatment Number: 24 out of Total Treatments: 30    HBO Diagnosis:     Indications: Lower Extremity Diabetic Wound ___(site)(Right foot)  Adenike HermosilloNatalietera Basket 3     Safety checks performed prior to treatment. See doc flowsheets for documentation. Objective           Recent Labs     21  0803 21  1028   POCGLU 203* 133*       Pre treatment Vital Signs       Temp: 98.1 °F (36.7 °C)     Pulse: 111     Resp: 18     BP: (!) 138/95(SN will inform )     Blood Sugar 203    Post treatment Vital Signs  Temp: 97.8 °F (36.6 °C)  Pulse: 93  Resp: 16  BP: 123/88  Blood Sugar 133    Assessment        Physical Exam:  General Appearance:  alert and oriented to person, place and time, well-developed and well-nourished, in no acute distress, alert and oriented to person, place and time, well-developed and well nourished, in no acute distress and alert    Pre Tympanic Membrane Assessment:  tympanic membranes intact bilaterally, normal color    Post Tympanic Membrane Assessment:  Right: Normal(per Pt.)  Left: Normal(per Pt.)    Pulmonary/Chest:  clear to auscultation bilaterally- no wheezes, rales or rhonchi, normal air movement, no respiratory distress and no chest wall tenderness    Cardiovascular:  normal, regular rate and rhythm    Chamber #: [id number]       Treatment Start Time: 0814     Pressure Reached Time: 0830  WALESKA : 2  Number of Air Breaks:  Treatment Status: No Air break      Decompression Time: 1000   Treatment End Time: 1016    Symptoms Noted During Treatment: None    TOTAL TIME AT DEPTH-90    TOTAL TIME IN CHAMBER-122    Adverse Event: no      I was present on these premises and immediately available to furnish assistance & direction throughout the procedure.        Plan Ruddy Sifuentes is a 36 y.o. male  did successfully complete today's hyperbaric oxygen treatment at [address] [place] [address]. In my clinical judgement, ongoing HBO therapy is  necessary at this time, given a threat to patient function, limb or life from the current condition. Supervision and attendance of Hyperbaric Oxygen Therapy provided. Continue HBO treatment as outlined in the treatment plan. Hyperbaric Oxygen: Ruddy Sifuentes tolerated Treatment Number: 24 well today without complications.      Electronically signed by Hina Ortega MD on 3/11/2021 at 11:45 AM

## 2021-03-12 ENCOUNTER — APPOINTMENT (OUTPATIENT)
Dept: GENERAL RADIOLOGY | Age: 41
End: 2021-03-12
Payer: COMMERCIAL

## 2021-03-12 ENCOUNTER — HOSPITAL ENCOUNTER (OUTPATIENT)
Age: 41
Setting detail: OBSERVATION
Discharge: HOME | End: 2021-03-13
Attending: INTERNAL MEDICINE | Admitting: INTERNAL MEDICINE
Payer: COMMERCIAL

## 2021-03-12 DIAGNOSIS — R07.9 CHEST PAIN, UNSPECIFIED TYPE: Primary | ICD-10-CM

## 2021-03-12 DIAGNOSIS — F41.1 ANXIETY STATE: ICD-10-CM

## 2021-03-12 DIAGNOSIS — R77.8 ELEVATED TROPONIN: ICD-10-CM

## 2021-03-12 LAB
ALBUMIN SERPL-MCNC: 4.1 GM/DL (ref 3.4–5)
ALCOHOL SCREEN SERUM: <0.01 %WT/VOL
ALP BLD-CCNC: 42 IU/L (ref 40–128)
ALT SERPL-CCNC: 20 U/L (ref 10–40)
ANION GAP SERPL CALCULATED.3IONS-SCNC: 11 MMOL/L (ref 4–16)
AST SERPL-CCNC: 17 IU/L (ref 15–37)
BASOPHILS ABSOLUTE: 0.1 K/CU MM
BASOPHILS RELATIVE PERCENT: 0.5 % (ref 0–1)
BILIRUB SERPL-MCNC: 0.5 MG/DL (ref 0–1)
BUN BLDV-MCNC: 15 MG/DL (ref 6–23)
CALCIUM SERPL-MCNC: 9.1 MG/DL (ref 8.3–10.6)
CHLORIDE BLD-SCNC: 100 MMOL/L (ref 99–110)
CO2: 26 MMOL/L (ref 21–32)
CREAT SERPL-MCNC: 1 MG/DL (ref 0.9–1.3)
D DIMER: <200 NG/ML(DDU)
DIFFERENTIAL TYPE: ABNORMAL
EKG ATRIAL RATE: 113 BPM
EKG ATRIAL RATE: 98 BPM
EKG DIAGNOSIS: NORMAL
EKG DIAGNOSIS: NORMAL
EKG P AXIS: 54 DEGREES
EKG P-R INTERVAL: 148 MS
EKG Q-T INTERVAL: 324 MS
EKG Q-T INTERVAL: 344 MS
EKG QRS DURATION: 84 MS
EKG QRS DURATION: 98 MS
EKG QTC CALCULATION (BAZETT): 439 MS
EKG QTC CALCULATION (BAZETT): 450 MS
EKG R AXIS: 33 DEGREES
EKG R AXIS: 42 DEGREES
EKG T AXIS: 3 DEGREES
EKG T AXIS: 34 DEGREES
EKG VENTRICULAR RATE: 116 BPM
EKG VENTRICULAR RATE: 98 BPM
EOSINOPHILS ABSOLUTE: 0.2 K/CU MM
EOSINOPHILS RELATIVE PERCENT: 1.7 % (ref 0–3)
GFR AFRICAN AMERICAN: >60 ML/MIN/1.73M2
GFR NON-AFRICAN AMERICAN: >60 ML/MIN/1.73M2
GLUCOSE BLD-MCNC: 129 MG/DL (ref 70–99)
GLUCOSE BLD-MCNC: 135 MG/DL (ref 70–99)
GLUCOSE BLD-MCNC: 174 MG/DL (ref 70–99)
GLUCOSE BLD-MCNC: 175 MG/DL (ref 70–99)
HCT VFR BLD CALC: 41.3 % (ref 42–52)
HEMOGLOBIN: 13.5 GM/DL (ref 13.5–18)
IMMATURE NEUTROPHIL %: 0.2 % (ref 0–0.43)
LIPASE: 26 IU/L (ref 13–60)
LYMPHOCYTES ABSOLUTE: 2 K/CU MM
LYMPHOCYTES RELATIVE PERCENT: 18.6 % (ref 24–44)
MCH RBC QN AUTO: 30.3 PG (ref 27–31)
MCHC RBC AUTO-ENTMCNC: 32.7 % (ref 32–36)
MCV RBC AUTO: 92.6 FL (ref 78–100)
MONOCYTES ABSOLUTE: 0.8 K/CU MM
MONOCYTES RELATIVE PERCENT: 7.7 % (ref 0–4)
NUCLEATED RBC %: 0 %
PDW BLD-RTO: 12.6 % (ref 11.7–14.9)
PLATELET # BLD: 334 K/CU MM (ref 140–440)
PMV BLD AUTO: 9.4 FL (ref 7.5–11.1)
POTASSIUM SERPL-SCNC: 3.8 MMOL/L (ref 3.5–5.1)
PRO-BNP: 87.42 PG/ML
RBC # BLD: 4.46 M/CU MM (ref 4.6–6.2)
SARS-COV-2, NAAT: NOT DETECTED
SEGMENTED NEUTROPHILS ABSOLUTE COUNT: 7.5 K/CU MM
SEGMENTED NEUTROPHILS RELATIVE PERCENT: 71.3 % (ref 36–66)
SODIUM BLD-SCNC: 137 MMOL/L (ref 135–145)
SOURCE: NORMAL
TOTAL IMMATURE NEUTOROPHIL: 0.02 K/CU MM
TOTAL NUCLEATED RBC: 0 K/CU MM
TOTAL PROTEIN: 7.7 GM/DL (ref 6.4–8.2)
TROPONIN T: 0.01 NG/ML
TROPONIN T: 0.01 NG/ML
TROPONIN T: <0.01 NG/ML
TSH HIGH SENSITIVITY: 1.77 UIU/ML (ref 0.27–4.2)
WBC # BLD: 10.5 K/CU MM (ref 4–10.5)

## 2021-03-12 PROCEDURE — 93010 ELECTROCARDIOGRAM REPORT: CPT | Performed by: INTERNAL MEDICINE

## 2021-03-12 PROCEDURE — 87635 SARS-COV-2 COVID-19 AMP PRB: CPT

## 2021-03-12 PROCEDURE — 2709999900 HC NON-CHARGEABLE SUPPLY

## 2021-03-12 PROCEDURE — G0378 HOSPITAL OBSERVATION PER HR: HCPCS

## 2021-03-12 PROCEDURE — 84484 ASSAY OF TROPONIN QUANT: CPT

## 2021-03-12 PROCEDURE — 82962 GLUCOSE BLOOD TEST: CPT

## 2021-03-12 PROCEDURE — 85379 FIBRIN DEGRADATION QUANT: CPT

## 2021-03-12 PROCEDURE — C1894 INTRO/SHEATH, NON-LASER: HCPCS

## 2021-03-12 PROCEDURE — 6370000000 HC RX 637 (ALT 250 FOR IP): Performed by: NURSE PRACTITIONER

## 2021-03-12 PROCEDURE — 94761 N-INVAS EAR/PLS OXIMETRY MLT: CPT

## 2021-03-12 PROCEDURE — 93458 L HRT ARTERY/VENTRICLE ANGIO: CPT | Performed by: INTERNAL MEDICINE

## 2021-03-12 PROCEDURE — 93458 L HRT ARTERY/VENTRICLE ANGIO: CPT

## 2021-03-12 PROCEDURE — 6360000002 HC RX W HCPCS: Performed by: NURSE PRACTITIONER

## 2021-03-12 PROCEDURE — 99244 OFF/OP CNSLTJ NEW/EST MOD 40: CPT | Performed by: INTERNAL MEDICINE

## 2021-03-12 PROCEDURE — 36415 COLL VENOUS BLD VENIPUNCTURE: CPT

## 2021-03-12 PROCEDURE — 93005 ELECTROCARDIOGRAM TRACING: CPT | Performed by: PHYSICIAN ASSISTANT

## 2021-03-12 PROCEDURE — 2580000003 HC RX 258: Performed by: PHYSICIAN ASSISTANT

## 2021-03-12 PROCEDURE — 85025 COMPLETE CBC W/AUTO DIFF WBC: CPT

## 2021-03-12 PROCEDURE — 83880 ASSAY OF NATRIURETIC PEPTIDE: CPT

## 2021-03-12 PROCEDURE — G0480 DRUG TEST DEF 1-7 CLASSES: HCPCS

## 2021-03-12 PROCEDURE — 93308 TTE F-UP OR LMTD: CPT

## 2021-03-12 PROCEDURE — 84443 ASSAY THYROID STIM HORMONE: CPT

## 2021-03-12 PROCEDURE — C1769 GUIDE WIRE: HCPCS

## 2021-03-12 PROCEDURE — 96374 THER/PROPH/DIAG INJ IV PUSH: CPT

## 2021-03-12 PROCEDURE — 6360000002 HC RX W HCPCS

## 2021-03-12 PROCEDURE — 6360000004 HC RX CONTRAST MEDICATION

## 2021-03-12 PROCEDURE — 71045 X-RAY EXAM CHEST 1 VIEW: CPT

## 2021-03-12 PROCEDURE — 96375 TX/PRO/DX INJ NEW DRUG ADDON: CPT

## 2021-03-12 PROCEDURE — 6360000002 HC RX W HCPCS: Performed by: PHYSICIAN ASSISTANT

## 2021-03-12 PROCEDURE — 83690 ASSAY OF LIPASE: CPT

## 2021-03-12 PROCEDURE — 96372 THER/PROPH/DIAG INJ SC/IM: CPT

## 2021-03-12 PROCEDURE — 80053 COMPREHEN METABOLIC PANEL: CPT

## 2021-03-12 PROCEDURE — 2580000003 HC RX 258: Performed by: NURSE PRACTITIONER

## 2021-03-12 PROCEDURE — 99284 EMERGENCY DEPT VISIT MOD MDM: CPT

## 2021-03-12 PROCEDURE — 2500000003 HC RX 250 WO HCPCS

## 2021-03-12 RX ORDER — PROMETHAZINE HYDROCHLORIDE 25 MG/1
12.5 TABLET ORAL EVERY 6 HOURS PRN
Status: DISCONTINUED | OUTPATIENT
Start: 2021-03-12 | End: 2021-03-13 | Stop reason: HOSPADM

## 2021-03-12 RX ORDER — ONDANSETRON 2 MG/ML
4 INJECTION INTRAMUSCULAR; INTRAVENOUS EVERY 30 MIN PRN
Status: DISCONTINUED | OUTPATIENT
Start: 2021-03-12 | End: 2021-03-12 | Stop reason: SDUPTHER

## 2021-03-12 RX ORDER — LORAZEPAM 2 MG/ML
0.5 INJECTION INTRAMUSCULAR ONCE
Status: COMPLETED | OUTPATIENT
Start: 2021-03-12 | End: 2021-03-12

## 2021-03-12 RX ORDER — ACETAMINOPHEN 650 MG/1
650 SUPPOSITORY RECTAL EVERY 6 HOURS PRN
Status: DISCONTINUED | OUTPATIENT
Start: 2021-03-12 | End: 2021-03-13 | Stop reason: HOSPADM

## 2021-03-12 RX ORDER — MORPHINE SULFATE 4 MG/ML
4 INJECTION, SOLUTION INTRAMUSCULAR; INTRAVENOUS EVERY 30 MIN PRN
Status: DISCONTINUED | OUTPATIENT
Start: 2021-03-12 | End: 2021-03-12 | Stop reason: HOSPADM

## 2021-03-12 RX ORDER — FLUOXETINE HYDROCHLORIDE 20 MG/1
20 CAPSULE ORAL DAILY
Status: DISCONTINUED | OUTPATIENT
Start: 2021-03-12 | End: 2021-03-13 | Stop reason: HOSPADM

## 2021-03-12 RX ORDER — PANTOPRAZOLE SODIUM 40 MG/1
40 TABLET, DELAYED RELEASE ORAL
Status: DISCONTINUED | OUTPATIENT
Start: 2021-03-13 | End: 2021-03-13 | Stop reason: HOSPADM

## 2021-03-12 RX ORDER — ASPIRIN 81 MG/1
81 TABLET ORAL DAILY
Status: DISCONTINUED | OUTPATIENT
Start: 2021-03-12 | End: 2021-03-13 | Stop reason: HOSPADM

## 2021-03-12 RX ORDER — SODIUM CHLORIDE 0.9 % (FLUSH) 0.9 %
10 SYRINGE (ML) INJECTION PRN
Status: DISCONTINUED | OUTPATIENT
Start: 2021-03-12 | End: 2021-03-13 | Stop reason: HOSPADM

## 2021-03-12 RX ORDER — LISINOPRIL AND HYDROCHLOROTHIAZIDE 20; 12.5 MG/1; MG/1
1 TABLET ORAL DAILY
Status: DISCONTINUED | OUTPATIENT
Start: 2021-03-12 | End: 2021-03-13 | Stop reason: HOSPADM

## 2021-03-12 RX ORDER — DEXTROSE MONOHYDRATE 25 G/50ML
12.5 INJECTION, SOLUTION INTRAVENOUS PRN
Status: DISCONTINUED | OUTPATIENT
Start: 2021-03-12 | End: 2021-03-13 | Stop reason: HOSPADM

## 2021-03-12 RX ORDER — NICOTINE POLACRILEX 4 MG
15 LOZENGE BUCCAL PRN
Status: DISCONTINUED | OUTPATIENT
Start: 2021-03-12 | End: 2021-03-13 | Stop reason: HOSPADM

## 2021-03-12 RX ORDER — ONDANSETRON 2 MG/ML
4 INJECTION INTRAMUSCULAR; INTRAVENOUS EVERY 6 HOURS PRN
Status: DISCONTINUED | OUTPATIENT
Start: 2021-03-12 | End: 2021-03-13 | Stop reason: HOSPADM

## 2021-03-12 RX ORDER — POLYETHYLENE GLYCOL 3350 17 G/17G
17 POWDER, FOR SOLUTION ORAL DAILY PRN
Status: DISCONTINUED | OUTPATIENT
Start: 2021-03-12 | End: 2021-03-13 | Stop reason: HOSPADM

## 2021-03-12 RX ORDER — ACETAMINOPHEN 325 MG/1
650 TABLET ORAL EVERY 6 HOURS PRN
Status: DISCONTINUED | OUTPATIENT
Start: 2021-03-12 | End: 2021-03-13 | Stop reason: HOSPADM

## 2021-03-12 RX ORDER — ATORVASTATIN CALCIUM 40 MG/1
40 TABLET, FILM COATED ORAL NIGHTLY
Status: DISCONTINUED | OUTPATIENT
Start: 2021-03-12 | End: 2021-03-13 | Stop reason: HOSPADM

## 2021-03-12 RX ORDER — 0.9 % SODIUM CHLORIDE 0.9 %
1000 INTRAVENOUS SOLUTION INTRAVENOUS ONCE
Status: COMPLETED | OUTPATIENT
Start: 2021-03-12 | End: 2021-03-12

## 2021-03-12 RX ORDER — SODIUM CHLORIDE 0.9 % (FLUSH) 0.9 %
10 SYRINGE (ML) INJECTION EVERY 12 HOURS SCHEDULED
Status: DISCONTINUED | OUTPATIENT
Start: 2021-03-12 | End: 2021-03-13 | Stop reason: HOSPADM

## 2021-03-12 RX ORDER — LORAZEPAM 1 MG/1
1 TABLET ORAL EVERY 8 HOURS PRN
Status: DISCONTINUED | OUTPATIENT
Start: 2021-03-12 | End: 2021-03-13 | Stop reason: HOSPADM

## 2021-03-12 RX ORDER — DEXTROSE MONOHYDRATE 50 MG/ML
100 INJECTION, SOLUTION INTRAVENOUS PRN
Status: DISCONTINUED | OUTPATIENT
Start: 2021-03-12 | End: 2021-03-13 | Stop reason: HOSPADM

## 2021-03-12 RX ADMIN — SODIUM CHLORIDE 1000 ML: 9 INJECTION, SOLUTION INTRAVENOUS at 06:41

## 2021-03-12 RX ADMIN — ATORVASTATIN CALCIUM 40 MG: 40 TABLET, FILM COATED ORAL at 20:14

## 2021-03-12 RX ADMIN — MORPHINE SULFATE 4 MG: 4 INJECTION, SOLUTION INTRAMUSCULAR; INTRAVENOUS at 06:42

## 2021-03-12 RX ADMIN — SODIUM CHLORIDE, PRESERVATIVE FREE 10 ML: 5 INJECTION INTRAVENOUS at 20:15

## 2021-03-12 RX ADMIN — ASPIRIN 81 MG: 81 TABLET, COATED ORAL at 14:20

## 2021-03-12 RX ADMIN — FLUOXETINE 20 MG: 20 CAPSULE ORAL at 14:21

## 2021-03-12 RX ADMIN — LISINOPRIL AND HYDROCHLOROTHIAZIDE 1 TABLET: 12.5; 2 TABLET ORAL at 14:21

## 2021-03-12 RX ADMIN — ONDANSETRON 4 MG: 2 INJECTION INTRAMUSCULAR; INTRAVENOUS at 06:42

## 2021-03-12 RX ADMIN — ACETAMINOPHEN 650 MG: 325 TABLET ORAL at 19:00

## 2021-03-12 RX ADMIN — ENOXAPARIN SODIUM 40 MG: 40 INJECTION SUBCUTANEOUS at 14:21

## 2021-03-12 RX ADMIN — LORAZEPAM 0.5 MG: 2 INJECTION INTRAMUSCULAR; INTRAVENOUS at 10:11

## 2021-03-12 ASSESSMENT — PAIN SCALES - GENERAL
PAINLEVEL_OUTOF10: 10
PAINLEVEL_OUTOF10: 3

## 2021-03-12 ASSESSMENT — PAIN DESCRIPTION - LOCATION: LOCATION: CHEST

## 2021-03-12 ASSESSMENT — PAIN DESCRIPTION - DESCRIPTORS
DESCRIPTORS: CONSTANT
DESCRIPTORS: CONSTANT

## 2021-03-12 ASSESSMENT — PAIN DESCRIPTION - PAIN TYPE
TYPE: ACUTE PAIN
TYPE: ACUTE PAIN

## 2021-03-12 ASSESSMENT — PAIN DESCRIPTION - ONSET: ONSET: GRADUAL

## 2021-03-12 ASSESSMENT — PAIN SCALES - WONG BAKER: WONGBAKER_NUMERICALRESPONSE: 2

## 2021-03-12 NOTE — ED TRIAGE NOTES
PT stated chest pain started 2-3 hours ago. PT took tylenol at home with no relief. PT sated the pain is 8 out 10. Chest pain does not radiate anywhere else in his body. PT states this could be anxiety related.

## 2021-03-12 NOTE — CARE COORDINATION
Consult noted , \"Pt lives at home with mom- says wants him out of house. \"  Chart reviewed, in to see pt, introduced self and role explained. Pt was admitted for CP and SOB. Explained he and his mother have been recently arguing excessively and she is thinking of kicking him out and he feels this stress is why he ended up in the hospital. Pt feels he does not have many options as he is not able to put weight through his R foot for 3 more weeks from a recent mid foot amputation. Support and listening provided in addition to income based housing/[facility] info and homeless shelter info as pt states he has no income as he awaits the outcome of his disability claim. Discussed his mother will have to evict him as he is a resident of the home and this provided patient relief. States he currently lives at home with his mother and uses his wheelchair. Explained he follows with PCP, has insurance with affordable RX co-pays and reliable transportation per his mother. Pt denies any other needs at discharge.

## 2021-03-12 NOTE — PROGRESS NOTES
Pt down to cath lab for cardiac cath- consent signed. No family contacted per pt wishes. Dr Javier Ramesh aware- pt eat, and was given asa, and lovenox.

## 2021-03-12 NOTE — H&P
History and Physical  RAFAEL Champion-BC   Internal Medicine Hospitalist      Name:  Ana Lilia Roca /Age/Sex: 1980  (36 y.o. male)   MRN & CSN:  [id number] & [id number] Admission Date/Time: 3/12/2021  6:15 AM   Location:  [facility] PCP: Genny Banegas MD       Hospital Day: 1      Supervising Physician: Dr. Minesh Seals      Chief Complaint: Chest Pain and Shortness of Breath     Assessment and Plan:   Ana Lilia Roca is a 36 y.o. male who presents with Chest pain     Chest pain, r/o ACS - no known CAD, DDx: GERD, anxiety, initial trop neg, 2nd trop 0.014        -Last Echo () EF estimated at 55-60%, last stress test (21) normal       -admit for observation, telemetry monitoring        -consult Cardiology, Dr. Anushka Sales       -cont trending trop       -on ASA, Lipitor       -check lab works in AM including repeat ECG       -pending ethanol level, UDS, TSH     S/P Right transmetatarsal toe amputation (2021)   Left foot callus       -stable and pt f/u with podiatrist, Dr. Martinez Boudreaux       -skin assessment      DM type 2 - Last A1C 7.8       -Sliding scale       -monitor accucheck       -diabetic diet      Chronic Illnesses:        -hyperlipidemia       -hypertension -on lisinopril-hydrochlorothiazide       -anxiety -on Prozac, prn Ativan    Current diagnosis and plan of management discussed with the patient at the time of admission in lay language who agree to the above plan and disposition of admission for further care. All concerns and questions addressed.       Patient assessment and plan in conjunction with supervising physician - Dr. Ariana Hernandez, Carb control   DVT Prophylaxis [x] Lovenox, []  Heparin, [] SCDs, [] Ambulation  [] Long term AC   GI Prophylaxis [x] PPI,  [] H2 Blocker,  [] Carafate,  [] Diet,  [] No GI prophylaxis, N/A: patient is not under significant medical stress, non-ICU or is receiving a diet/tube feeds   Code Status  Full CODE Disposition Patient requires continued admission due to Chest pain. Discharge Plan: Patient plans to return home upon discharge. MDM [] Low, [x] Moderate,[]  High  Patient's risk as above due to:      [x] One or more chronic illnesses with mild exacerbation progression      [] Two or more stable chronic illnesses      [] Undiagnosed new problem with uncertain prognosis      [] Elective major surgery      []Prescription drug management     History of Present Illness:     Principal Problem: Chest pain  Richi Rios is a 36 y.o. male with past medical history of DM type 2, right diabetic foot ulcer with osteomyelitis, s/p right transmetatarsal toe amputation (1/8/2021), left foot callus, hyperlipidemia, hypertension, and anxiety presented to the ED with complaints of chest pain and shortness of breath. Patient reports that he was awake and trying to fall asleep this morning when the chest pain started followed by shortness of breath. He described substernal chest pain as throbbing pain without radiation, rates 8/10, and pain reduced to 2/10 after medicated with Ativan in ED. Chart review reveals that patient was admitted last January, 2021 for diabetic foot ulcer with osteomyelitis and had right transmetatarsal toe amputation on 1/8/2021. Patient had episode of rapid response due to chest pain, elevated BP, and tachycardia where cardiologist, Dr. Maria Del Carmen Mejia was consulted, had Echo and stress test was done. Patient stated during hospitalization last January, he had an episode of chest pain with elevated troponin. Patient denies palpitation, fever, chills or diaphoresis, cough, difficulty breathing, headache, paresthesias, abdominal pain, nausea, vomiting, changes in bowels, dysuria, hematuria, frequency or urgency, and B/L weakness. Rapid COVID19 test in ED is negative. Upon interview, the patient provided the history as above. ED Course: Discussed case with ED physician prior to admission.     ROS: Ten point ROS reviewed and negative, unless as noted above per HPI. Objective:   No intake or output data in the 24 hours ending 03/12/21 1114     Vitals: Temp/oral 98.3  Vitals:    03/12/21 0900 03/12/21 0930 03/12/21 1000 03/12/21 1103   BP:   98/74 117/85   Pulse: 100 104 117 104   Resp:  18 18 13   Temp:       TempSrc:       SpO2: 98% 100% 100% 96%   Weight:       Height:         Physical Exam: 03/12/21    GEN  -Awake, alert, appearing anxious male, sitting upright in bed in no apparent distress, cooperative, able to give adequate history. Appears older than given age. EYES -Pupils are equally round. No vision changes. No scleral erythema, discharge, or conjunctivitis. HENT -MM are moist. Oral pharynx without exudates, no evidence of thrush. NECK -Supple, no apparent thyromegaly or masses. RESP -LS CTA equal bilat, no wheezes, rales or rhonchi. Symmetric chest movement. No respiratory distress noted. C/V  -S1/S2 auscultated. Tachycardic without appreciable M/R/G. No JVD or carotid bruits. Peripheral pulses equal bilaterally and palpable. Peripheral pulses equal bilaterally and palpable. No peripheral edema. No reproducible chest wall tenderness. GI  -Abdomen is soft, round, non-distended, no significant tenderness. No masses or guarding. + BS in all quadrants. Rectal exam deferred.   -[place] catheter is not present. LYMPH  -No palpable cervical lymphadenopathy and no hepatosplenomegaly. No petechiae or ecchymoses. MS  -B/L extremities strong muscles strength. Full movements. No gross joint deformities. No swelling, intact sensation symmetrical. Right transmetatarsal toe amputation, Left foot callus. SKIN  -Normal coloration, warm, dry. No open wounds or ulcers. NEURO  -CN 2-12 appear grossly intact, normal speech, no lateralizing weakness. PSYC  -Awake, alert, oriented x 4. Anxious.      Past Medical History:      Past Medical History:   Diagnosis Date    Callus of foot     Right foot - see's  Jacobo Bullard Diabetic ulcer of right midfoot associated with type 2 diabetes mellitus, with fat layer exposed ([facility] 8/11/2020    Dizziness     positional    Essential hypertension     Follows with PCP    Lumbar radiculopathy     Septic embolism ([facility] 6/13/2020     Past Surgery History:  Patient  has a past surgical history that includes Cardiac catheterization (03/2018); [place] tooth extraction; Dental surgery; pr office/outpt visit,procedure only (Left, 4/17/2018); lumbar laminectomy (2018); hernia repair (Bilateral, 5/27/2020); Toe amputation (Right, 1/8/2021); and Achilles tendon surgery (Right, 1/8/2021). Social History:    FAM HX: Assessed: family history includes Diabetes in his father and mother; Heart Disease in his father.   Soc HX:   Social History     Socioeconomic History    Marital status: Single     Spouse name: Not on file    Number of children: Not on file    Years of education: Not on file    Highest education level: Not on file   Occupational History    Not on file   Social Needs    Financial resource strain: Not on file    Food insecurity     Worry: Not on file     Inability: Not on file    Transportation needs     Medical: Not on file     Non-medical: Not on file   Tobacco Use    Smoking status: Never Smoker    Smokeless tobacco: Never Used   Substance and Sexual Activity    Alcohol use: Yes     Comment: \"couple beers a night\"    Drug use: No    Sexual activity: Yes     Partners: Female   Lifestyle    Physical activity     Days per week: Not on file     Minutes per session: Not on file    Stress: Not on file   Relationships    Social connections     Talks on phone: Not on file     Gets together: Not on file     Attends L[employer]-day service: Not on file     Active member of club or organization: Not on file     Attends meetings of clubs or organizations: Not on file     Relationship status: Not on file    Intimate partner violence     Fear of current or ex partner: Not on file Emotionally abused: Not on file     Physically abused: Not on file     Forced sexual activity: Not on file   Other Topics Concern    Not on file   Social History Narrative    Not on file     TOBACCO:   reports that he has never smoked. He has never used smokeless tobacco.  ETOH:   reports current alcohol use. Drugs:  reports no history of drug use. Allergies: No Known Allergies  Medications:   Medications:    Infusions:   PRN Meds: morphine, 4 mg, Q30 Min PRN  ondansetron, 4 mg, Q30 Min PRN      Prior to Admission Meds:  Prior to Admission medications    Medication Sig Start Date End Date Taking? Authorizing Provider   FLUoxetine (PROZAC) 20 MG capsule Take 1 capsule by mouth daily 3/10/21  Yes Antonette Villa MD   glyBURIDE (DIABETA) 5 MG tablet Take 2 tablets by mouth 2 times daily (with meals) 2/5/21 5/6/21 Yes Harsh Costa PA-C   lisinopril-hydroCHLOROthiazide (PRINZIDE;ZESTORETIC) 20-12.5 MG per tablet TAKE 1 TABLET BY MOUTH ONCE DAILY 2/5/21  Yes Harsh Costa PA-C   metFORMIN (GLUCOPHAGE) 500 MG tablet Take 2 tablets by mouth 2 times daily (with meals) 2/5/21 5/6/21 Yes Harsh Costa PA-C   omeprazole (PRILOSEC) 20 MG delayed release capsule TAKE 1 CAPSULE BY MOUTH ONCE DAILY IN THE MORNING 2/5/21  Yes Harsh Costa PA-C   aspirin 81 MG tablet Take 81 mg by mouth daily   Yes Historical Provider, MD   acetaminophen (TYLENOL) 325 MG tablet Take 2 tablets by mouth every 4 hours as needed for Pain or Fever 2/28/18  Yes Nakul Emerson MD   LORazepam (ATIVAN) 1 MG tablet Take 1 tablet by mouth every 8 hours as needed for Anxiety for up to 30 days.  2/10/21 3/12/21  Paula Ellis MD   pioglitazone (ACTOS) 15 MG tablet Take 1 tablet by mouth daily  Patient taking differently: Take 15 mg by mouth daily Pt states PRN 10/1/20 3/10/21  Antonette Villa MD   blood glucose monitor kit and supplies Dispense sufficient amount for indicated testing frequency plus additional to accommodate PRN testing needs. Dispense all needed supplies to include: monitor, strips, lancing device, lancets, control solutions, alcohol swabs. 6/15/20   Armani Arredondo MD   blood glucose monitor strips Test 2 times a day & as needed for symptoms of irregular blood glucose. Dispense sufficient amount for indicated testing frequency plus additional to accommodate PRN testing needs. 6/15/20   Armani Arredondo MD   FreeStyle Lancets MISC 1 each by Does not apply route daily 6/15/20   Armani Arredondo MD     Data:     Laboratory this visit:  Reviewed  Recent Labs     03/12/21 0621   WBC 10.5   HGB 13.5   HCT 41.3*         Recent Labs     03/12/21 0621      K 3.8      CO2 26   BUN 15   CREATININE 1.0     Recent Labs     03/12/21 0621   AST 17   ALT 20   BILITOT 0.5   ALKPHOS 42     No results for input(s): INR in the last 72 hours. No results for input(s): CKTOTAL, CKMB, CKMBINDEX in the last 72 hours. Invalid input(s): Flashruss Prasad input(s): PRO-BNP    Radiology this visit:  Reviewed. Xr Chest Portable    Result Date: 3/12/2021  EXAMINATION: ONE XRAY VIEW OF THE CHEST 3/12/2021 6:14 am COMPARISON: Chest portable January 10, 2021. HISTORY: ORDERING SYSTEM PROVIDED HISTORY: CP TECHNOLOGIST PROVIDED HISTORY: Reason for exam:->CP Reason for Exam: CP Acuity: Acute Type of Exam: Initial Mechanism of Injury: CP Relevant Medical/Surgical History: CP FINDINGS: The heart is normal in size and configuration. The mediastinal contours are within normal limits. The lungs are well aerated. The pleural surfaces are normal and no evidence of a pleural effusion is seen. Bones and soft tissues are unremarkable.      Unremarkable single portable upright AP view of the chest.     EKG this visit:   EKG: Normal sinus rhythm, rate 98   Normal ECG   When compared with ECG of 12-MAR-2021 06:09,   Sinus rhythm has replaced Junctional rhythm     Current Treatment Team:  Treatment Team: Attending Provider: Yusuf Perrin MD; Physician Assistant: Zafar Rodríguez PA-C; Registered Nurse: Juan Manuel Hong RN; Consulting Physician: MD Ileana Wallace APRN-BC   [facility]  3/12/2021 11:14 AM      Electronically signed by RAFAEL Preciado CNP on 3/12/2021 at 11:14 AM

## 2021-03-12 NOTE — ED PROVIDER NOTES
Patient Identification  Edna Haynes is a 36 y.o. male    Chief Complaint  Chest Pain and Shortness of Breath      HPI  (History provided by patient)  This is a 36 y.o. male with history of anxiety, depression, hypertension, diabetes who was brought in by EMS for chief complaint of chest pain and shortness of breath. Onset was approximately 2 to 3 hours prior to arrival.  He was awake trying to fall asleep when it started. It is located in the substernal chest without radiation. Associated with shortness of breath. Denies any coughing. No fevers. No sick contacts. States that pain may be related to anxiety. Does have history of PE previously it was related to underlying sepsis, not currently anticoagulated. REVIEW OF SYSTEMS    Constitutional:  Denies fever, chills  HENT:  Denies sore throat or ear pain   Eyes: Denies vision changes, eye pain  Cardiovascular:  Denies syncope.  + CP  Respiratory:  Denies cough.  + SOB  GI:  Denies abdominal pain, nausea, vomiting  :  Denies dysuria, discharge  Musculoskeletal:  Denies back pain, joint pain  Skin:  Denies rash, pruritis  Neurologic:  Denies headache, focal weakness, or sensory changes     See HPI and nursing notes for additional information     I have reviewed the following nursing documentation:  Allergies: No Known Allergies    Past medical history:  has a past medical history of Callus of foot, Diabetic ulcer of right midfoot associated with type 2 diabetes mellitus, with fat layer exposed ([facility] [address] (8/11/2020), Dizziness, Essential hypertension, Lumbar radiculopathy, and Septic embolism ([address] [address] (6/13/2020). 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 Home medications:   Prior to Admission medications    Medication Sig Start Date End Date Taking? Authorizing Provider   FLUoxetine (PROZAC) 20 MG capsule Take 1 capsule by mouth daily 3/10/21  Yes Patrizia Cadet MD   glyBURIDE (DIABETA) 5 MG tablet Take 2 tablets by mouth 2 times daily (with meals) 2/5/21 5/6/21 Yes Dejon Mcgarry PA-C   lisinopril-hydroCHLOROthiazide (PRINZIDE;ZESTORETIC) 20-12.5 MG per tablet TAKE 1 TABLET BY MOUTH ONCE DAILY 2/5/21  Yes Dejon Mcgarry PA-C   metFORMIN (GLUCOPHAGE) 500 MG tablet Take 2 tablets by mouth 2 times daily (with meals) 2/5/21 5/6/21 Yes Dejon Mcgarry PA-C   omeprazole (PRILOSEC) 20 MG delayed release capsule TAKE 1 CAPSULE BY MOUTH ONCE DAILY IN THE MORNING 2/5/21  Yes Dejon Mcgarry PA-C   aspirin 81 MG tablet Take 81 mg by mouth daily   Yes Historical Provider, MD   acetaminophen (TYLENOL) 325 MG tablet Take 2 tablets by mouth every 4 hours as needed for Pain or Fever 2/28/18  Yes Vinita Andujar MD   LORazepam (ATIVAN) 1 MG tablet Take 1 tablet by mouth every 8 hours as needed for Anxiety for up to 30 days. 2/10/21 3/12/21  Omid Campo MD   pioglitazone (ACTOS) 15 MG tablet Take 1 tablet by mouth daily  Patient taking differently: Take 15 mg by mouth daily Pt states PRN 10/1/20 3/10/21  Patrizia Cadet MD   blood glucose monitor kit and supplies Dispense sufficient amount for indicated testing frequency plus additional to accommodate PRN testing needs. Dispense all needed supplies to include: monitor, strips, lancing device, lancets, control solutions, alcohol swabs. 6/15/20   Mike Norman MD   blood glucose monitor strips Test 2 times a day & as needed for symptoms of irregular blood glucose. Dispense sufficient amount for indicated testing frequency plus additional to accommodate PRN testing needs.  6/15/20   Mike Norman MD   FreeStyle Lancets MISC 1 each by Does not apply route daily 6/15/20   Mike Norman MD       Social history:  reports that he has never smoked. He has never used smokeless tobacco. He reports current alcohol use. He reports that he does not use drugs. Family history:    Family History   Problem Relation Age of Onset    Heart Disease Father     Diabetes Father     Diabetes Mother          Exam  /68   Pulse 106   Temp 98.2 °F (36.8 °C) (Oral)   Resp 16   Ht 6' (1.829 m)   Wt 170 lb (77.1 kg)   SpO2 98%   BMI 23.06 kg/m²   Nursing note and vitals reviewed. Constitutional: Well developed, well nourished. No acute distress. HENT:      Head: Normocephalic and atraumatic. Ears: External ears normal.      Nose: Nose normal.     Mouth: Membrane mucosa moist and pink. No posterior oropharynx erythema or tonsillar edema  Eyes: Anicteric sclera. No discharge, PERRL  Neck: Supple. Trachea midline. Cardiovascular: Mildly tachycardic at approximately 105, regular rhythm, no murmurs, rubs, or gallops, radial pulses 2+ bilaterally. Pulmonary/Chest: Effort normal. No respiratory distress. CTAB. No stridor. No wheezes. No rales. Abdominal: Soft. Mild epigastric TTP. No distension. No guarding, rebound tenderness, or evidence of ascites. : No CVA tenderness. Musculoskeletal: Moves all extremities. No gross deformity. Neurological: Alert and oriented to person, place, and time. Normal muscle tone. Skin: Warm and dry. No rash. Psychiatric: Normal mood and affect. Behavior is normal.      EKG   Please see Dr. Salma Nelson note for EKG read.       Radiographs (if obtained):  [] The following radiograph was interpreted by myself in the absence of a radiologist:   [x] Radiologist's Report Reviewed:  XR CHEST PORTABLE   Final Result   Unremarkable single portable upright AP view of the chest.                Labs  Results for orders placed or performed during the hospital encounter of 03/12/21   COVID-19, Rapid    Specimen: Nasopharyngeal   Result Value Ref Range    Source THROAT     SARS-CoV-2, NAAT NOT DETECTED    CBC Auto Differential   Result Value Ref Range    WBC 10.5 4.0 - 10.5 K/CU MM    RBC 4.46 (L) 4.6 - 6.2 M/CU MM    Hemoglobin 13.5 13.5 - 18.0 GM/DL    Hematocrit 41.3 (L) 42 - 52 %    MCV 92.6 78 - 100 FL    MCH 30.3 27 - 31 PG    MCHC 32.7 32.0 - 36.0 %    RDW 12.6 11.7 - 14.9 %    Platelets 985 790 - 012 K/CU MM    MPV 9.4 7.5 - 11.1 FL    Differential Type AUTOMATED DIFFERENTIAL     Segs Relative 71.3 (H) 36 - 66 %    Lymphocytes % 18.6 (L) 24 - 44 %    Monocytes % 7.7 (H) 0 - 4 %    Eosinophils % 1.7 0 - 3 %    Basophils % 0.5 0 - 1 %    Segs Absolute 7.5 K/CU MM    Lymphocytes Absolute 2.0 K/CU MM    Monocytes Absolute 0.8 K/CU MM    Eosinophils Absolute 0.2 K/CU MM    Basophils Absolute 0.1 K/CU MM    Nucleated RBC % 0.0 %    Total Nucleated RBC 0.0 K/CU MM    Total Immature Neutrophil 0.02 K/CU MM    Immature Neutrophil % 0.2 0 - 0.43 %   Comprehensive Metabolic Panel w/ Reflex to MG   Result Value Ref Range    Sodium 137 135 - 145 MMOL/L    Potassium 3.8 3.5 - 5.1 MMOL/L    Chloride 100 99 - 110 mMol/L    CO2 26 21 - 32 MMOL/L    BUN 15 6 - 23 MG/DL    CREATININE 1.0 0.9 - 1.3 MG/DL    Glucose 174 (H) 70 - 99 MG/DL    Calcium 9.1 8.3 - 10.6 MG/DL    Albumin 4.1 3.4 - 5.0 GM/DL    Total Protein 7.7 6.4 - 8.2 GM/DL    Total Bilirubin 0.5 0.0 - 1.0 MG/DL    ALT 20 10 - 40 U/L    AST 17 15 - 37 IU/L    Alkaline Phosphatase 42 40 - 128 IU/L    GFR Non-African American >60 >60 mL/min/1.73m2    GFR African American >60 >60 mL/min/1.73m2    Anion Gap 11 4 - 16   Troponin   Result Value Ref Range    Troponin T <0.010 <0.01 NG/ML   Brain Natriuretic Peptide   Result Value Ref Range    Pro-BNP 87.42 <300 PG/ML   D-dimer, Quantitative   Result Value Ref Range    D-Dimer, Quant <200 <230 NG/mL(DDU)   Lipase   Result Value Ref Range    Lipase 26 13 - 60 IU/L   Troponin   Result Value Ref Range    Troponin T 0.014 (H) <0.01 NG/ML   TSH without Reflex   Result Value Ref Range TSH, High Sensitivity 1.770 0.270 - 4.20 uIu/ml   Ethanol   Result Value Ref Range    Alcohol Scrn <0.01 <0.01 %WT/VOL   EKG 12 Lead   Result Value Ref Range    Ventricular Rate 116 BPM    Atrial Rate 113 BPM    QRS Duration 84 ms    Q-T Interval 324 ms    QTc Calculation (Bazett) 450 ms    R Axis 33 degrees    T Axis 3 degrees    Diagnosis       Accelerated Junctional rhythm  Cannot rule out Anterior infarct , age undetermined  Abnormal ECG  When compared with ECG of 10-NIRALI-2021 17:41,  Junctional rhythm has replaced Sinus rhythm  ST no longer depressed in Lateral leads     EKG 12 Lead   Result Value Ref Range    Ventricular Rate 98 BPM    Atrial Rate 98 BPM    P-R Interval 148 ms    QRS Duration 98 ms    Q-T Interval 344 ms    QTc Calculation (Bazett) 439 ms    P Axis 54 degrees    R Axis 42 degrees    T Axis 34 degrees    Diagnosis       Normal sinus rhythm  Normal ECG  When compared with ECG of 12-MAR-2021 06:09,  Sinus rhythm has replaced Junctional rhythm           MDM  Patient presents for chest pain, admits to some problems with anxiety and fighting with mother, has history of diabetes that resulted in amputation of the right distal foot. He is tachycardic on arrival, D-dimer sent which is negative. Initial troponin negative. Remainder of initial work-up is unremarkable. While waiting for delta troponin patient  became tachycardic, tachypneic, appeared to be having anxiety, given Ativan and this is significantly improved. He does report that he does not want to go home because of fighting with his mother and this is making him very anxious. His delta troponin is now positive. Plan is to admit, spoke with Lisa LYONS who agreed to admit. In consideration of current COVID19 pandemic, with effort to minimize unnecessary provider exposure, this patient was seen at bedside by me independently.   However, in compliance with current hospital ETTA/ED protocol, prior to admission I did discuss this patient case with emergency department physician, Dr. Simi Quesada. Of note, this Pt was NOT admitted to the ICU. Final Impression  1. Chest pain, unspecified type    2. Elevated troponin    3. Anxiety state        Blood pressure 116/68, pulse 106, temperature 98.2 °F (36.8 °C), temperature source Oral, resp. rate 16, height 6' (1.829 m), weight 170 lb (77.1 kg), SpO2 98 %. Disposition:  Admit to telemetry in stable condition. Patient was given scripts for the following medications. I counseled patient how to take these medications. Current Discharge Medication List          This chart was generated using the [address] [place] [address] dictation system. I created this record but it may contain dictation errors given the limitations of this technology.        Claude Ogren, PA-C  03/12/21 8736

## 2021-03-12 NOTE — ED NOTES
Wound clinic called at the request of patient, he had an appointment @.       January Baires RN  03/12/21 9805

## 2021-03-12 NOTE — ED NOTES
Bed: ED-26  Expected date:   Expected time:   Means of arrival:   Comments:  Medic-- chest pain     Meenakshi Broderick  03/12/21 0615

## 2021-03-12 NOTE — PROGRESS NOTES
Medication History  [facility]    Patient Name: Gris Ward 1980     Medication history has been completed by: Neil Otero CPhT    Source(s) of information: patient and insurance claims     Primary Care Physician: Antonette Villa MD     Pharmacy: [facility]     Allergies as of 03/12/2021    (No Known Allergies)        Prior to Admission medications    Medication Sig Start Date End Date Taking? Authorizing Provider   FLUoxetine (PROZAC) 20 MG capsule Take 1 capsule by mouth daily 3/10/21   Antonette Villa MD   LORazepam (ATIVAN) 1 MG tablet Take 1 tablet by mouth every 8 hours as needed for Anxiety for up to 30 days. 2/10/21 3/12/21  Paula Ellis MD   glyBURIDE (DIABETA) 5 MG tablet Take 2 tablets by mouth 2 times daily (with meals) 2/5/21 5/6/21  Harsh Costa PA-C   lisinopril-hydroCHLOROthiazide (PRINZIDE;ZESTORETIC) 20-12.5 MG per tablet TAKE 1 TABLET BY MOUTH ONCE DAILY 2/5/21   Harsh Costa PA-C   metFORMIN (GLUCOPHAGE) 500 MG tablet Take 2 tablets by mouth 2 times daily (with meals) 2/5/21 5/6/21  Harsh Costa PA-C   omeprazole (PRILOSEC) 20 MG delayed release capsule TAKE 1 CAPSULE BY MOUTH ONCE DAILY IN THE MORNING 2/5/21   Harsh Costa PA-C   pioglitazone (ACTOS) 15 MG tablet Take 1 tablet by mouth daily  Patient taking differently: Take 15 mg by mouth daily Pt states PRN 10/1/20 3/10/21  Antonette Villa MD   blood glucose monitor kit and supplies Dispense sufficient amount for indicated testing frequency plus additional to accommodate PRN testing needs. Dispense all needed supplies to include: monitor, strips, lancing device, lancets, control solutions, alcohol swabs. 6/15/20   Jason Shafer MD   blood glucose monitor strips Test 2 times a day & as needed for symptoms of irregular blood glucose. Dispense sufficient amount for indicated testing frequency plus additional to accommodate PRN testing needs.  6/15/20   Jason Shafer MD FreeStyle Lancets MISC 1 each by Does not apply route daily 6/15/20   Jason Shafer MD   aspirin 81 MG tablet Take 81 mg by mouth daily    Historical Provider, MD   acetaminophen (TYLENOL) 325 MG tablet Take 2 tablets by mouth every 4 hours as needed for Pain or Fever 2/28/18   Nakul Emerson MD     Medications requiring reconciliation with provider:    Patient reports he only takes the lorazepam when he's having hyperbaric treatments done. (ordered)    Comments:  Medication list reviewed with patient and insurance claims verified. Patient reports he only takes the lorazepam when he's having hyperbaric treatments done.   States he only  takes pioglitazone if his BS spikes    To my knowledge the above medication history is accurate as of 3/12/2021 10:38 AM.   Neil Otero CPhT   3/12/2021 10:38 AM

## 2021-03-12 NOTE — PROGRESS NOTES
Pt c/o of numbness, tingling, and pain at cath site- Site assessed- no redness/swelling, warm to touch, +1 radial pulse, VS are stable, PS Dr. Sd Metzger.  - Tylenol given    PS Dr. Sd Metzger:  3/12/21 6:58 PM   [phone] From: [address] RE: linda mac pt c/o of pain and numbness and tingling at heart cath site - just had heart cath done.  Good pulse  Unread     3/12/21 6:58 PM   no signs of bleeding or redness  Read 6:59 PM   Response:   3/12/21 6:59 PM   KIMANI Roblero

## 2021-03-12 NOTE — ED PROVIDER NOTES
12 lead EKG per my interpretation #1:  Sinus Tachycardia 116  Axis is   Normal  QTc is  450  There is no specific T wave changes appreciated. There is no specific ST wave changes appreciated. Prior EKG to compare with was not available         Demond Verdin DO  03/12/21 0620      12 lead EKG per my interpretation:  Normal Sinus Rhythm 98  Axis is   Normal  QTc is  439  There is no specific T wave changes appreciated. There is no specific ST wave changes appreciated.     Prior EKG to compare with was available        Demond Verdin DO  03/12/21 1036

## 2021-03-12 NOTE — PROGRESS NOTES
PS Dr. Urbano Vázquez:   3/12/21 1:03 PM   [phone] Hospital or Facility: [address] From: Scar Reilly RE: Satish Rebolledo Patient's Doctor: Saulo Bates RM: [id number] Pt consult for: chest pain.  Thank you Need Callback: NO CALLBACK REQ  Read 1:10 PM     3/12/21 1:16 PM   Do you think he will do stress test? or is he good to eat  Read 1:35 PM   Response:   3/12/21 1:35 PM   Good to eat

## 2021-03-12 NOTE — CONSULTS
injection 10 mL, 2 times per day  sodium chloride flush 0.9 % injection 10 mL, PRN  promethazine (PHENERGAN) tablet 12.5 mg, Q6H PRN    Or  ondansetron (ZOFRAN) injection 4 mg, Q6H PRN  acetaminophen (TYLENOL) tablet 650 mg, Q6H PRN    Or  acetaminophen (TYLENOL) suppository 650 mg, Q6H PRN  polyethylene glycol (GLYCOLAX) packet 17 g, Daily PRN  atorvastatin (LIPITOR) tablet 40 mg, Nightly  enoxaparin (LOVENOX) injection 40 mg, Daily  insulin lispro (HUMALOG) injection vial 0-6 Units, TID WC  insulin lispro (HUMALOG) injection vial 0-3 Units, Nightly  glucose (GLUTOSE) 40 % oral gel 15 g, PRN  dextrose 50 % IV solution, PRN  glucagon (rDNA) injection 1 mg, PRN  dextrose 5 % solution, PRN  nitroglycerin (NITRO-BID) 2 % ointment 1 inch, Q6H PRN      Current Facility-Administered Medications   Medication Dose Route Frequency Provider Last Rate Last Admin    aspirin EC tablet 81 mg  81 mg Oral Daily Stephanie Casillas, APRN - CNP   81 mg at 03/12/21 1420    FLUoxetine (PROZAC) capsule 20 mg  20 mg Oral Daily Hansa Kady, APRN - CNP   20 mg at 03/12/21 1421    lisinopril-hydroCHLOROthiazide (PRINZIDE;ZESTORETIC) 20-12.5 MG per tablet 1 tablet  1 tablet Oral Daily Hansa Kady, APRN - CNP   1 tablet at 03/12/21 1421    LORazepam (ATIVAN) tablet 1 mg  1 mg Oral Q8H PRN Hansa Kady, APRN - CNP        [START ON 3/13/2021] pantoprazole (PROTONIX) tablet 40 mg  40 mg Oral QAM AC Stephanie Casillas, APRN - CNP        sodium chloride flush 0.9 % injection 10 mL  10 mL Intravenous 2 times per day Stephanie Casillas, APRN - CNP        sodium chloride flush 0.9 % injection 10 mL  10 mL Intravenous PRN Stephanie Casillas, APRN - CNP        promethazine (PHENERGAN) tablet 12.5 mg  12.5 mg Oral Q6H PRN Hansa Kady, APRN - CNP        Or    ondansetron (ZOFRAN) injection 4 mg  4 mg Intravenous Q6H PRN RAFAEL Walker CNP        acetaminophen (TYLENOL) tablet 650 mg  650 mg Oral Q6H PRN RAFAEL Morales CNP        Or  acetaminophen (TYLENOL) suppository 650 mg  650 mg Rectal Q6H PRN RAFAEL Hebert CNP        polyethylene glycol (GLYCOLAX) packet 17 g  17 g Oral Daily PRN RAFAEL Hebert CNP        atorvastatin (LIPITOR) tablet 40 mg  40 mg Oral Nightly RAFAEL Walker - CNP        enoxaparin (LOVENOX) injection 40 mg  40 mg Subcutaneous Daily RAFAEL Walker - CNP   40 mg at 03/12/21 1421    insulin lispro (HUMALOG) injection vial 0-6 Units  0-6 Units Subcutaneous TID WC Stephanie Casillas APRN - CNP        insulin lispro (HUMALOG) injection vial 0-3 Units  0-3 Units Subcutaneous Nightly Stephanie CasillasRAFAEL daly - CNP        glucose (GLUTOSE) 40 % oral gel 15 g  15 g Oral PRN RAFAEL Walker - CNP        dextrose 50 % IV solution  12.5 g Intravenous PRN RAFAEL Walker - CNP        glucagon (rDNA) injection 1 mg  1 mg Intramuscular PRN RAFAEL Walker - CNP        dextrose 5 % solution  100 mL/hr Intravenous PRN RAFAEL Hebert CNP        nitroglycerin (NITRO-BID) 2 % ointment 1 inch  1 inch Topical Q6H PRN Kerri Griffin MD              Review of Systems:    · Constitutional: No Fever or Weight Loss    · Eyes: No Decreased Vision  · ENT: No Headaches, Hearing Loss or Vertigo  · Cardiovascular: + chest pain, dyspnea on exertion, palpitations or loss of consciousness  · Respiratory: No cough or wheezing    · Gastrointestinal: No abdominal pain, appetite loss, blood in stools, constipation, diarrhea or heartburn  · Genitourinary: No dysuria, trouble voiding, or hematuria  · Musculoskeletal: No gait disturbance, weakness or joint complaints  · Integumentary: No rash or pruritis  · Neurological: No TIA or stroke symptoms  · Psychiatric: No anxiety or depression  · Endocrine: No malaise, fatigue or temperature intolerance  · Hematologic/Lymphatic: No bleeding problems, blood clots or swollen lymph nodes  · Allergic/Immunologic: No nasal congestion or hives  All systems negative except as marked.      ·    ·    ·      Physical Examination:    Vitals:    03/12/21 1245   BP: 116/68   Pulse: 106   Resp: 16   Temp: 98.2 °F (36.8 °C)   SpO2: 98%      Wt Readings from Last 3 Encounters:   03/12/21 170 lb (77.1 kg)   02/13/21 170 lb (77.1 kg)   02/05/21 170 lb (77.1 kg)     Body mass index is 23.06 kg/m².        General Appearance: No distress, conversant     Constitutional: Well developed, Well nourished, No acute distress, Non-toxic appearance.    HENT:  Normocephalic, Atraumatic, Bilateral external ears normal, Oropharynx moist, No oral exudates, Nose normal. Neck- Normal range of motion, No tenderness, Supple, No stridor,no apical-carotid delay, no carotid bruit  Eyes: PERRL, EOMI, Conjunctiva normal, No discharge.    Respiratory:  Normal breath sounds, No respiratory distress, No wheezing, No chest tenderness. ,no use of accessory muscles, diaphragm movement is normal  Cardiovascular: (PMI) apex non displaced,no lifts no thrills, no s3,no s4, Normal heart rate, Normal rhythm, No murmurs, No rubs, No gallops. Carotid arteries pulse and amplitude are normal no bruit, no abdominal bruit noted ( normal abdominal aorta ausculation), femoral arteries pulse and amplitude are normal no bruit, pedal pulses are normal.  GI: Bowel sounds normal, Soft, No tenderness, No masses, No pulsatile masses, no hepatosplenomegally, no bruits  : External genitalia appear normal, No masses or lesions. No discharge. No CVA tenderness.    Musculoskeletal: Intact distal pulses, No edema, No tenderness, No cyanosis, No clubbing. Good range of motion in all major joints. No tenderness to palpation or major deformities noted. Back- No tenderness. Integument: Warm, Dry, No erythema, No rash.    Skin: no rash, no ulcers  Lymphatic: No lymphadenopathy noted.    Neurologic: Alert & oriented x 3, Normal motor function, Normal sensory function, No focal deficits noted.    Psychiatric: Affect normal, Judgment normal, Mood normal.   Lab Review        Recent Labs       09/28/16 0010    WBC  12.3*    HGB  14.4    HCT  43.8    PLT  316            Recent Labs       09/28/16 0010    NA  136    K  3.9    CL  95*    CO2  23    BUN  10    CREATININE  0.8           Recent Labs       09/28/16 0010    AST  12*    ALT  10    BILITOT  0.4    ALKPHOS  124       No results for input(s): TROPONINI in the last 72 hours. No results found for: BNP  No results found for: INR, PROTIME        EKG:nsr     Chest Xray:nad     ECHO:pending  Labs, echo, meds reviewed  Assessment:      Recommendations:     1. Chest pain: unstable angina, ECHO ordered, will recommend LHC. LHC scheduled,risk and benefit explained and consent obtained  2. HTN: stable  3. DM:stable  4.  Health maintenance: exerise and diet  All labs, medications and tests reviewed, continue all other medications of all above medical condition listed as is.          Roseann Vela MD,   Roseann Vela MD, 3/12/2021 3:03 PM

## 2021-03-13 VITALS
BODY MASS INDEX: 23.03 KG/M2 | WEIGHT: 170 LBS | SYSTOLIC BLOOD PRESSURE: 107 MMHG | HEIGHT: 72 IN | OXYGEN SATURATION: 98 % | HEART RATE: 93 BPM | RESPIRATION RATE: 13 BRPM | DIASTOLIC BLOOD PRESSURE: 82 MMHG | TEMPERATURE: 97.8 F

## 2021-03-13 LAB
CHOLESTEROL: 108 MG/DL
GLUCOSE BLD-MCNC: 138 MG/DL (ref 70–99)
HCT VFR BLD CALC: 42.1 % (ref 42–52)
HDLC SERPL-MCNC: 27 MG/DL
HEMOGLOBIN: 14.1 GM/DL (ref 13.5–18)
LDL CHOLESTEROL DIRECT: 64 MG/DL
MCH RBC QN AUTO: 30.5 PG (ref 27–31)
MCHC RBC AUTO-ENTMCNC: 33.5 % (ref 32–36)
MCV RBC AUTO: 91.1 FL (ref 78–100)
PDW BLD-RTO: 13 % (ref 11.7–14.9)
PLATELET # BLD: 335 K/CU MM (ref 140–440)
PMV BLD AUTO: 9.7 FL (ref 7.5–11.1)
RBC # BLD: 4.62 M/CU MM (ref 4.6–6.2)
TRIGL SERPL-MCNC: 178 MG/DL
TROPONIN T: <0.01 NG/ML
WBC # BLD: 7.5 K/CU MM (ref 4–10.5)

## 2021-03-13 PROCEDURE — 80061 LIPID PANEL: CPT

## 2021-03-13 PROCEDURE — 82962 GLUCOSE BLOOD TEST: CPT

## 2021-03-13 PROCEDURE — 6370000000 HC RX 637 (ALT 250 FOR IP): Performed by: NURSE PRACTITIONER

## 2021-03-13 PROCEDURE — G0378 HOSPITAL OBSERVATION PER HR: HCPCS

## 2021-03-13 PROCEDURE — 83721 ASSAY OF BLOOD LIPOPROTEIN: CPT

## 2021-03-13 PROCEDURE — 85027 COMPLETE CBC AUTOMATED: CPT

## 2021-03-13 PROCEDURE — 36415 COLL VENOUS BLD VENIPUNCTURE: CPT

## 2021-03-13 PROCEDURE — 2580000003 HC RX 258: Performed by: NURSE PRACTITIONER

## 2021-03-13 PROCEDURE — 96372 THER/PROPH/DIAG INJ SC/IM: CPT

## 2021-03-13 PROCEDURE — 6360000002 HC RX W HCPCS: Performed by: NURSE PRACTITIONER

## 2021-03-13 PROCEDURE — 84484 ASSAY OF TROPONIN QUANT: CPT

## 2021-03-13 RX ORDER — ATORVASTATIN CALCIUM 40 MG/1
40 TABLET, FILM COATED ORAL NIGHTLY
Qty: 30 TABLET | Refills: 3 | Status: SHIPPED | OUTPATIENT
Start: 2021-03-13 | End: 2022-01-24 | Stop reason: SDUPTHER

## 2021-03-13 RX ORDER — OXYCODONE HYDROCHLORIDE AND ACETAMINOPHEN 5; 325 MG/1; MG/1
1 TABLET ORAL EVERY 4 HOURS PRN
Status: DISCONTINUED | OUTPATIENT
Start: 2021-03-13 | End: 2021-03-13 | Stop reason: HOSPADM

## 2021-03-13 RX ADMIN — LISINOPRIL AND HYDROCHLOROTHIAZIDE 1 TABLET: 12.5; 2 TABLET ORAL at 10:15

## 2021-03-13 RX ADMIN — SODIUM CHLORIDE, PRESERVATIVE FREE 10 ML: 5 INJECTION INTRAVENOUS at 10:16

## 2021-03-13 RX ADMIN — OXYCODONE HYDROCHLORIDE AND ACETAMINOPHEN 1 TABLET: 5; 325 TABLET ORAL at 05:56

## 2021-03-13 RX ADMIN — PANTOPRAZOLE SODIUM 40 MG: 40 TABLET, DELAYED RELEASE ORAL at 05:56

## 2021-03-13 RX ADMIN — LORAZEPAM 1 MG: 1 TABLET ORAL at 04:29

## 2021-03-13 RX ADMIN — ASPIRIN 81 MG: 81 TABLET, COATED ORAL at 10:15

## 2021-03-13 RX ADMIN — ENOXAPARIN SODIUM 40 MG: 40 INJECTION SUBCUTANEOUS at 10:16

## 2021-03-13 RX ADMIN — FLUOXETINE 20 MG: 20 CAPSULE ORAL at 10:15

## 2021-03-13 ASSESSMENT — PAIN DESCRIPTION - ONSET: ONSET: GRADUAL

## 2021-03-13 ASSESSMENT — PAIN - FUNCTIONAL ASSESSMENT: PAIN_FUNCTIONAL_ASSESSMENT: ACTIVITIES ARE NOT PREVENTED

## 2021-03-13 ASSESSMENT — PAIN SCALES - GENERAL
PAINLEVEL_OUTOF10: 0
PAINLEVEL_OUTOF10: 4

## 2021-03-13 ASSESSMENT — PAIN DESCRIPTION - DESCRIPTORS: DESCRIPTORS: DISCOMFORT

## 2021-03-13 NOTE — DISCHARGE SUMMARY
[address] Hospitalist     Discharge Summary    Name:  Aaron Garcia /Age/Sex: 1980  (36 y.o. male)   MRN & CSN:  [id number] & [id number] Admission Date/Time: 3/12/2021  6:15 AM   Attending:  Liliya Latif MD Discharging Physician: Liliya Latif MD     HPI:     Please, see admission HPI in [address] and patient's hospital course below    Hospital Course:   Aaron Garcia is a 36 y.o.  male  who presents with Chest pain  and the following assessments below, reflect the patient's hospital course     Chest pain -ACS was ruled out  CAD history    Troponin were negative,  Recent stress test done on 2021 was reported as a normal study  Due to continued chest pain LHC was conducted on  and reported as no obstructive lesions of the coronaries   Continue with aspirin, Lipitor, lisinopril,    DM type II -resume home regimen on discharge, except Metformin which has to be held for 48 hours post heart cath and to be restarted then after    Patient is hemodynamically stable for DC to home    The patient expressed appropriate understanding of and agreement with the discharge recommendations, medications, and plan.      Consults this admission:  IP CONSULT TO HOSPITALIST  IP CONSULT TO CARDIOLOGY  IP CONSULT TO CASE MANAGEMENT    Discharge Instruction:   Follow up appointments: Cardiology as needed  Primary care physician:  within 1 to 2 weeks    Diet:  cardiac diet and diabetic diet   Activity: activity as tolerated  Disposition: Discharged to:   [x]Home, [][facility], []SNF, []Acute Rehab, []Hospice   Condition on discharge: Stable    Discharge Medications:      Chemo Yanez   Home Medication Instructions RIA:[id number]    Printed on:21 0942   Medication Information                      acetaminophen (TYLENOL) 325 MG tablet  Take 2 tablets by mouth every 4 hours as needed for Pain or Fever             aspirin 81 MG tablet  Take 81 mg by mouth daily             atorvastatin (LIPITOR) 40 MG tablet  Take 1 tablet by mouth nightly             blood glucose monitor kit and supplies  Dispense sufficient amount for indicated testing frequency plus additional to accommodate PRN testing needs. Dispense all needed supplies to include: monitor, strips, lancing device, lancets, control solutions, alcohol swabs. blood glucose monitor strips  Test 2 times a day & as needed for symptoms of irregular blood glucose. Dispense sufficient amount for indicated testing frequency plus additional to accommodate PRN testing needs. FLUoxetine (PROZAC) 20 MG capsule  Take 1 capsule by mouth daily             FreeStyle Lancets MISC  1 each by Does not apply route daily             glyBURIDE (DIABETA) 5 MG tablet  Take 2 tablets by mouth 2 times daily (with meals)             lisinopril-hydroCHLOROthiazide (PRINZIDE;ZESTORETIC) 20-12.5 MG per tablet  TAKE 1 TABLET BY MOUTH ONCE DAILY             metFORMIN (GLUCOPHAGE) 500 MG tablet  Take 2 tablets by mouth 2 times daily (with meals)             omeprazole (PRILOSEC) 20 MG delayed release capsule  TAKE 1 CAPSULE BY MOUTH ONCE DAILY IN THE MORNING             pioglitazone (ACTOS) 15 MG tablet  Take 1 tablet by mouth daily                 Subjective _patient was resting in bed with no distress, denies any chest pain or shortness of breath at this time    Objective Findings at Discharge:   BP 96/69   Pulse 78   Temp 98 °F (36.7 °C) (Oral)   Resp 16   Ht 6' (1.829 m)   Wt 170 lb (77.1 kg)   SpO2 98%   BMI 23.06 kg/m²            PHYSICAL EXAM   GEN Awake male, resting in bed in no apparent distress. Appears given age. RESP Clear to auscultation, no wheezes, rales or rhonchi. CARDIO/VAS - S1/S2 auscultated. Regular rate without appreciable murmurs, rubs, or gallops. Peripheral pulses equal bilaterally and palpable. No peripheral edema. GI Abdomen is soft without significant tenderness, masses, or guarding.  Bowel sounds are normoactive  MSK No gross joint deformities. Spontaneous movement of all extremities  SKIN Normal coloration, warm, dry. NEURO Cranial nerves appear grossly intact, normal speech, no lateralizing weakness.     BMP/CBC  Recent Labs     03/12/21  0621 03/13/21  0348     --    K 3.8  --      --    CO2 26  --    BUN 15  --    CREATININE 1.0  --    WBC 10.5 7.5   HCT 41.3* 42.1    335         Discharge Time of 31 minutes    Electronically signed by Ekaterina Marshall MD on 3/13/2021 at 9:26 AM

## 2021-03-15 ENCOUNTER — CARE COORDINATION (OUTPATIENT)
Dept: CASE MANAGEMENT | Age: 41
End: 2021-03-15

## 2021-03-15 ENCOUNTER — TELEPHONE (OUTPATIENT)
Dept: FAMILY MEDICINE CLINIC | Age: 41
End: 2021-03-15

## 2021-03-16 ENCOUNTER — CARE COORDINATION (OUTPATIENT)
Dept: CASE MANAGEMENT | Age: 41
End: 2021-03-16

## 2021-03-16 ENCOUNTER — TELEPHONE (OUTPATIENT)
Dept: FAMILY MEDICINE CLINIC | Age: 41
End: 2021-03-16

## 2021-03-16 NOTE — TELEPHONE ENCOUNTER
If he was tested before the development of these symptoms for covid he needs re-tested. Can make an appt at the [facility] or go to ER.

## 2021-03-16 NOTE — TELEPHONE ENCOUNTER
Tammy D/C nurse from [facility] called and stated patient was D/C 3-13-21 was at hospital for Chest pain, now resolved. Patient now complains of stomach pain ,vomiting and has no way to come in to the ER or walk in clinic.  Please advise Has no ability to do VV visit only phone call

## 2021-03-16 NOTE — CARE COORDINATION
to ED\". Agreeable for CTN to notify PCP. Unable to do VV appt as only has home land line, no smart phone or computer. Thinks he might be active w/ [facility] but uncertain, CTN to follow up. Confirmed copy of AVS received, reviewed. Did not obtain Lipitor upon discharge citing he had no way yo get it. Denies any other rx needs. Advised to obtain 90 day supply of routine, prn meds. 1269 T/C Pratt Alva Chery. Discussed above. Requested call to Patient for further advisement and possible telehealth appt as Patient has no transportation and only has landline. [place] to f/u w/ PCP, Patient. [facility] Discussed above. Bryanakbar Villa reports new referral received, agency running 3 days out for scheduling. Requested SW visit d/t above concerns as well. Agency to f/u w/ Patient. 9981 T/C Dr Daniel Arteaga. Message left requesting call to Patient to reschedule appt and need to coordinate appt w/ insurance transportation. 3914 Update to Patient re: Dr Jak Chery, Dr Daniel Arteaga and [facility], [facility] understanding. Advised to immediatly contact insurance for transportation once appt scheduled. Discussed health risks of appt non-compliance. Again encouraged to attempt to obtain ride for [facility], Urgent Care. Reports he lives w/ Mother and that the two have not been getting along and he may need to find new place to live. Advised [employer], [employer] for transportation. Discussed benefits of S [place]ia LSW; Patient declines as he prefers to meet in person w/ [facility]. Encouraged to contact [employer] for additional resource support. Encouraged to contact CTN as needed. Agreeable to ongoing CT follow up.      Navjot García, RN

## 2021-03-16 NOTE — TELEPHONE ENCOUNTER
Spoke with pt confirmed, stomach pain, nausea & vomitting. padvise pt to go to er. Pt states he is needs a ride. Informed pt if he doesn't have anyone to take him please call squad.

## 2021-03-17 ENCOUNTER — TELEPHONE (OUTPATIENT)
Dept: FAMILY MEDICINE CLINIC | Age: 41
End: 2021-03-17

## 2021-03-19 ENCOUNTER — CARE COORDINATION (OUTPATIENT)
Dept: CASE MANAGEMENT | Age: 41
End: 2021-03-19

## 2021-03-19 NOTE — CARE COORDINATION
[facility] Transitions Follow Up Call    3/19/2021    Patient: Darryl Inman  Patient : 1980   MRN: [id number]  Reason for Admission: CP w/ ACS ruled out  Discharge Date: 3/13/21 RARS: Readmission Risk Score: 13  .  COVID19 RISK MONITORING  COVID19 SCREEN: 3/12/21 Negative  PATIENT RISK FACTORS: DM  RARS: 13  KADENY PCP: Jay    Attempt to reach for follow up call unsuccessful; message left requesting call back.      Saige Sky RN

## 2021-03-23 ENCOUNTER — TELEPHONE (OUTPATIENT)
Dept: WOUND CARE | Age: 41
End: 2021-03-23

## 2021-03-24 ENCOUNTER — CARE COORDINATION (OUTPATIENT)
Dept: CASE MANAGEMENT | Age: 41
End: 2021-03-24

## 2021-03-24 ENCOUNTER — TELEPHONE (OUTPATIENT)
Dept: WOUND CARE | Age: 41
End: 2021-03-24

## 2021-03-24 NOTE — TELEPHONE ENCOUNTER
Carrie at [facility] called to report a wound to bottom of his left foot had broken open. SN called back and she said Dr Johnathon Meyers has actually been managing and wound care clinic did not need to do anything at this time.     Electronically signed by Navid Bain RN on 3/24/2021 at 12:06 PM

## 2021-03-24 NOTE — CARE COORDINATION
[facility] Transitions Follow Up Call    3/24/2021    Patient: Supa Starks  Patient : 1980   MRN: [id number]  Reason for Admission:   Discharge Date: 3/13/21 RARS: Readmission Risk Score: 13  .  COVID19 RISK MONITORING  COVID19 SCREEN: 3/12/21 Negative  PATIENT RISK FACTORS: DM  RARS: 13  KADENY PCP: Yonatan Schmidt    2nd unsuccessful attempt to reach for follow up call:  [phone] No answer, no vm option  [phone] Number disconnected     Episode closed per protocol, no further outreach scheduled. Active w/ [facility], has PCP.   Karen Harris RN

## 2021-03-30 ENCOUNTER — TELEPHONE (OUTPATIENT)
Dept: FAMILY MEDICINE CLINIC | Age: 41
End: 2021-03-30

## 2021-03-30 NOTE — TELEPHONE ENCOUNTER
Fany Graf, we can help you do a whole lot better with your diabetes. We need you to sit with a nutritionist and learn a few things that you feel you can do to help your diabetes. We will never be able to successfully help treat your diabetes with your use of regular Coca-Cola and pancakes. (Tania please return if he agrees to a nutritionist).

## 2021-03-30 NOTE — TELEPHONE ENCOUNTER
Filippo Zapien,      3/29/21 fbs 200, heart rate 109 - 113/ pt was drinking regular coca cola & admits to having pancakes for breakfast.   per nurse pt is asymptomatic and confirmed pt taking meds as directed

## 2021-03-31 ENCOUNTER — TELEPHONE (OUTPATIENT)
Dept: WOUND CARE | Age: 41
End: 2021-03-31

## 2021-04-05 NOTE — TELEPHONE ENCOUNTER
Spoke with pt , declined nutrition consult. Pt states 4/5/21 bs 122. 4/5/21 evening meal bs 162. Currently taking metformin 1 bid, not taking glyburide d/t fear of low blood sugar. Fbs. Pt states diet breakfast cherrios or toast, lunch lunch meat sandwich, frozen dinners for dinner. Tried to explain to pt to try to eat 4 to 5 small meals per day. Lean protein balanced with good carbs, vegetables.

## 2021-04-05 NOTE — TELEPHONE ENCOUNTER
Rosalie Jarek. We appreciate your efforts in improving your sugars. It is great if you can both have good sugars and remain low on the glyburide. Please keep the Metformin. Please see if you can eat oats for breakfast and occasionally eggs. Toast and cereal are particularly not great for diabetes. Avoid all fruit juices and sodas. Black coffee and tea are fine. Consider supper leftovers for breakfast.  Consider eating sandwiches with lettuce wrapping the meat instead of bread. Consider hummus and/or avocados as part of your lettuce sandwich.

## 2021-04-07 ENCOUNTER — OFFICE VISIT (OUTPATIENT)
Dept: FAMILY MEDICINE CLINIC | Age: 41
End: 2021-04-07
Payer: COMMERCIAL

## 2021-04-07 VITALS
OXYGEN SATURATION: 98 % | SYSTOLIC BLOOD PRESSURE: 110 MMHG | WEIGHT: 160 LBS | HEIGHT: 72 IN | HEART RATE: 103 BPM | TEMPERATURE: 97.2 F | DIASTOLIC BLOOD PRESSURE: 68 MMHG | BODY MASS INDEX: 21.67 KG/M2

## 2021-04-07 DIAGNOSIS — F33.2 SEVERE EPISODE OF RECURRENT MAJOR DEPRESSIVE DISORDER, WITHOUT PSYCHOTIC FEATURES (HCC): ICD-10-CM

## 2021-04-07 DIAGNOSIS — M86.9 DIABETIC FOOT ULCER WITH OSTEOMYELITIS (HCC): ICD-10-CM

## 2021-04-07 DIAGNOSIS — I10 ESSENTIAL HYPERTENSION: ICD-10-CM

## 2021-04-07 DIAGNOSIS — E11.621 DIABETIC FOOT ULCER WITH OSTEOMYELITIS (HCC): ICD-10-CM

## 2021-04-07 DIAGNOSIS — E11.69 DIABETIC FOOT ULCER WITH OSTEOMYELITIS (HCC): ICD-10-CM

## 2021-04-07 DIAGNOSIS — L97.509 DIABETIC FOOT ULCER WITH OSTEOMYELITIS (HCC): ICD-10-CM

## 2021-04-07 DIAGNOSIS — E11.40 TYPE 2 DIABETES MELLITUS WITH DIABETIC NEUROPATHY, WITHOUT LONG-TERM CURRENT USE OF INSULIN (HCC): Primary | ICD-10-CM

## 2021-04-07 PROCEDURE — G8427 DOCREV CUR MEDS BY ELIG CLIN: HCPCS | Performed by: FAMILY MEDICINE

## 2021-04-07 PROCEDURE — 3051F HG A1C>EQUAL 7.0%<8.0%: CPT | Performed by: FAMILY MEDICINE

## 2021-04-07 PROCEDURE — 1036F TOBACCO NON-USER: CPT | Performed by: FAMILY MEDICINE

## 2021-04-07 PROCEDURE — 99214 OFFICE O/P EST MOD 30 MIN: CPT | Performed by: FAMILY MEDICINE

## 2021-04-07 PROCEDURE — 2022F DILAT RTA XM EVC RTNOPTHY: CPT | Performed by: FAMILY MEDICINE

## 2021-04-07 PROCEDURE — G8420 CALC BMI NORM PARAMETERS: HCPCS | Performed by: FAMILY MEDICINE

## 2021-04-07 RX ORDER — METOPROLOL SUCCINATE 25 MG/1
25 TABLET, EXTENDED RELEASE ORAL DAILY
Qty: 30 TABLET | Refills: 5 | Status: SHIPPED | OUTPATIENT
Start: 2021-04-07 | End: 2021-12-20 | Stop reason: SDUPTHER

## 2021-04-07 RX ORDER — FLUOXETINE HYDROCHLORIDE 40 MG/1
40 CAPSULE ORAL DAILY
Qty: 30 CAPSULE | Refills: 5 | Status: SHIPPED | OUTPATIENT
Start: 2021-04-07 | End: 2021-08-09

## 2021-04-07 RX ORDER — LISINOPRIL 10 MG/1
10 TABLET ORAL DAILY
Qty: 30 TABLET | Refills: 5 | Status: SHIPPED | OUTPATIENT
Start: 2021-04-07 | End: 2021-12-20 | Stop reason: SDUPTHER

## 2021-04-07 NOTE — PROGRESS NOTES
4/7/2021    Indira Hartley    Chief Complaint   Patient presents with    Other     4 week f/u    Depression     unchanged       HPI    Rosalie Tilley is a 36 y.o. male who presents today with follow-up. Patient has a lot more motion in his voice today. He was excited for me to see his foot although it showed to me last time today he is really excited about how well it is healed. Patient did discuss the possibility of losing his mom and was able to voice that a couple of times. Patient went into a very dark place after his dad passed away. He did not come back into our office for years. I believe that when his diabetes had started. Patient has lost 10 pounds. He feels is partially from nutrition and partially before in his mid emotions were worse. Patient is not having chest pain. Patient's hemoglobin A1c improved from 10.9-7.8. The last check was 1 month ago by the hospital.  Patient was admitted then for chest pain and ruled out for an MI. Was in observation.     REVIEW OF SYSTEMS    Constitutional:  Denies fever, chills, weight loss or weakness  Eyes:  no photophobia or discharge  ENT:  no sore throat or ear pain  Cardiovascular:  Denies chest pain, palpitations or swelling  Respiratory:  Denies cough or shortness of breath  GI:  no abdominal pain, nausea, vomiting, or diarrhea  Musculoskeletal:  no back pain  Skin:  No rashes  Neurologic:  no headache, focal weakness, or sensory changes  Endocrine:  no polyuria or polydipsia      PAST MEDICAL HISTORY  Past Medical History:   Diagnosis Date    Callus of foot     Right foot - see's Dr. Richelle Love Diabetic ulcer of right midfoot associated with type 2 diabetes mellitus, with fat layer exposed ([address] 8/11/2020    Dizziness     positional    Essential hypertension     Follows with PCP    Lumbar radiculopathy     Septic embolism ([address] 6/13/2020       FAMILY HISTORY  Family History   Problem Relation Age of Onset    Heart Disease Father     Diabetes Father     Diabetes Mother        SOCIAL HISTORY  Social History     Socioeconomic History    Marital status: Single     Spouse name: Not on file    Number of children: Not on file    Years of education: Not on file    Highest education level: Not on file   Occupational History    Not on file   Social Needs    Financial resource strain: Not on file    Food insecurity     Worry: Not on file     Inability: Not on file    Transportation needs     Medical: Not on file     Non-medical: Not on file   Tobacco Use    Smoking status: Never Smoker    Smokeless tobacco: Never Used   Substance and Sexual Activity    Alcohol use: Yes     Comment: \"couple beers a night\"    Drug use: No    Sexual activity: Yes     Partners: Female   Lifestyle    Physical activity     Days per week: Not on file     Minutes per session: Not on file    Stress: Not on file   Relationships    Social connections     Talks on phone: Not on file     Gets together: Not on file     Attends [employer] service: Not on file     Active member of club or organization: Not on file     Attends meetings of clubs or organizations: Not on file     Relationship status: Not on file    Intimate partner violence     Fear of current or ex partner: Not on file     Emotionally abused: Not on file     Physically abused: Not on file     Forced sexual activity: Not on file   Other Topics Concern    Not on file   Social History Narrative    Not on file        SURGICAL HISTORY  Past Surgical History:   Procedure Laterality Date    ACHILLES TENDON SURGERY Right 1/8/2021    RIGHT ACHILLES TENDON LENGTHENING REPAIR performed by Terence Messina DPM at [place] [address]  03/2018    [place]    DENTAL SURGERY      teeth extractions -half per patient    HERNIA REPAIR Bilateral 5/27/2020    [address] performed by Yeison Yao MD at [address] [place] [address]  2018    [place] OFFICE/OUTPT [facility] Left 4/17/2018    L5-S1 HEMILAMINECTOMY, REMOVAL OF DISC LEFT SIDE performed by Xiomara Stephens MD at [facility] Right 1/8/2021    RIGHT TRANSMETATARSAL TOE AMPUTATION performed by Joya Bumpers, DPM at [facility] EXTRACTION         CURRENT MEDICATIONS  Current Outpatient Medications   Medication Sig Dispense Refill    FLUoxetine (PROZAC) 40 MG capsule Take 1 capsule by mouth daily 30 capsule 5    lisinopril (PRINIVIL;ZESTRIL) 10 MG tablet Take 1 tablet by mouth daily 30 tablet 5    metoprolol succinate (TOPROL XL) 25 MG extended release tablet Take 1 tablet by mouth daily 30 tablet 5    atorvastatin (LIPITOR) 40 MG tablet Take 1 tablet by mouth nightly 30 tablet 3    glyBURIDE (DIABETA) 5 MG tablet Take 2 tablets by mouth 2 times daily (with meals) 360 tablet 1    metFORMIN (GLUCOPHAGE) 500 MG tablet Take 2 tablets by mouth 2 times daily (with meals) 360 tablet 1    omeprazole (PRILOSEC) 20 MG delayed release capsule TAKE 1 CAPSULE BY MOUTH ONCE DAILY IN THE MORNING 90 capsule 1    pioglitazone (ACTOS) 15 MG tablet Take 1 tablet by mouth daily (Patient taking differently: Take 15 mg by mouth daily Pt states PRN) 30 tablet 5    blood glucose monitor kit and supplies Dispense sufficient amount for indicated testing frequency plus additional to accommodate PRN testing needs. Dispense all needed supplies to include: monitor, strips, lancing device, lancets, control solutions, alcohol swabs. 1 kit 0    blood glucose monitor strips Test 2 times a day & as needed for symptoms of irregular blood glucose. Dispense sufficient amount for indicated testing frequency plus additional to accommodate PRN testing needs.  100 strip 0    FreeStyle Lancets MISC 1 each by Does not apply route daily 100 each 3    aspirin 81 MG tablet Take 81 mg by mouth daily      acetaminophen (TYLENOL) 325 MG tablet Take 2 tablets by mouth every 4 hours as needed for Pain or Fever 120 tablet 3     No current facility-administered medications for this visit. ALLERGIES  No Known Allergies    PHYSICAL EXAM  /68   Pulse 103   Temp 97.2 °F (36.2 °C)   Ht 6' (1.829 m)   Wt 160 lb (72.6 kg)   SpO2 98%   BMI 21.70 kg/m²     ASSESSMENT & PLAN    1. Type 2 diabetes mellitus with diabetic neuropathy, without long-term current use of insulin ([facility])  Improving control. Too early to recheck an A1c    2. Diabetic foot ulcer with osteomyelitis ([facility]  Patient with a second ulcer now with the same sort of callus over the metatarsal area on his left foot    4. Severe episode of recurrent major depressive disorder, without psychotic features [facility])  Patient has made nice improvement but will try for better. Increase 20 mg to 40 mg daily  - FLUoxetine (PROZAC) 40 MG capsule; Take 1 capsule by mouth daily  Dispense: 30 capsule; Refill: 5    5. Essential hypertension  Due to tachycardia, will take with a water pill and decrease lisinopril but keep it at a lower dose and add Toprol. See below. - lisinopril (PRINIVIL;ZESTRIL) 10 MG tablet; Take 1 tablet by mouth daily  Dispense: 30 tablet; Refill: 5  - metoprolol succinate (TOPROL XL) 25 MG extended release tablet; Take 1 tablet by mouth daily  Dispense: 30 tablet; Refill: 5    Follow-up 2 months or earlier as needed.        Electronically signed by Della Schwartz MD on 4/7/2021

## 2021-04-19 ENCOUNTER — TELEPHONE (OUTPATIENT)
Dept: FAMILY MEDICINE CLINIC | Age: 41
End: 2021-04-19

## 2021-04-19 NOTE — TELEPHONE ENCOUNTER
Reporting patient's Fasting Blood Sugar---    Today was:    170    Please go ahead and do a referral to a Dietician and then let Tati Chambers know who you will send him to see.

## 2021-04-20 DIAGNOSIS — E11.40 TYPE 2 DIABETES MELLITUS WITH DIABETIC NEUROPATHY, WITHOUT LONG-TERM CURRENT USE OF INSULIN (HCC): Primary | ICD-10-CM

## 2021-04-20 NOTE — TELEPHONE ENCOUNTER
Tania please let patient know that I put in a consult to a nutritionist at [facility]. They should be reaching him in a week. (I do not know who Dahlia Douglass is).

## 2021-04-27 ENCOUNTER — HOSPITAL ENCOUNTER (OUTPATIENT)
Age: 41
Discharge: HOME | End: 2021-04-27
Payer: COMMERCIAL

## 2021-04-27 LAB
ANION GAP SERPL CALCULATED.3IONS-SCNC: 9 MMOL/L (ref 4–16)
BASOPHILS ABSOLUTE: 0 K/CU MM
BASOPHILS RELATIVE PERCENT: 0.4 % (ref 0–1)
BUN BLDV-MCNC: 11 MG/DL (ref 6–23)
CALCIUM SERPL-MCNC: 8.9 MG/DL (ref 8.3–10.6)
CHLORIDE BLD-SCNC: 98 MMOL/L (ref 99–110)
CO2: 28 MMOL/L (ref 21–32)
CREAT SERPL-MCNC: 0.9 MG/DL (ref 0.9–1.3)
DIFFERENTIAL TYPE: ABNORMAL
EOSINOPHILS ABSOLUTE: 0.1 K/CU MM
EOSINOPHILS RELATIVE PERCENT: 1.3 % (ref 0–3)
ERYTHROCYTE SEDIMENTATION RATE: 72 MM/HR (ref 0–15)
GFR AFRICAN AMERICAN: >60 ML/MIN/1.73M2
GFR NON-AFRICAN AMERICAN: >60 ML/MIN/1.73M2
GLUCOSE BLD-MCNC: 244 MG/DL (ref 70–99)
HCT VFR BLD CALC: 42.4 % (ref 42–52)
HEMOGLOBIN: 14.2 GM/DL (ref 13.5–18)
IMMATURE NEUTROPHIL %: 0.3 % (ref 0–0.43)
LYMPHOCYTES ABSOLUTE: 1.6 K/CU MM
LYMPHOCYTES RELATIVE PERCENT: 17.2 % (ref 24–44)
MCH RBC QN AUTO: 31.6 PG (ref 27–31)
MCHC RBC AUTO-ENTMCNC: 33.5 % (ref 32–36)
MCV RBC AUTO: 94.4 FL (ref 78–100)
MONOCYTES ABSOLUTE: 0.6 K/CU MM
MONOCYTES RELATIVE PERCENT: 6.3 % (ref 0–4)
NUCLEATED RBC %: 0 %
PDW BLD-RTO: 13.2 % (ref 11.7–14.9)
PLATELET # BLD: 492 K/CU MM (ref 140–440)
PMV BLD AUTO: 9.2 FL (ref 7.5–11.1)
POTASSIUM SERPL-SCNC: 5.3 MMOL/L (ref 3.5–5.1)
RBC # BLD: 4.49 M/CU MM (ref 4.6–6.2)
SEGMENTED NEUTROPHILS ABSOLUTE COUNT: 6.7 K/CU MM
SEGMENTED NEUTROPHILS RELATIVE PERCENT: 74.5 % (ref 36–66)
SODIUM BLD-SCNC: 135 MMOL/L (ref 135–145)
TOTAL IMMATURE NEUTOROPHIL: 0.03 K/CU MM
TOTAL NUCLEATED RBC: 0 K/CU MM
WBC # BLD: 9 K/CU MM (ref 4–10.5)

## 2021-04-27 PROCEDURE — 85652 RBC SED RATE AUTOMATED: CPT

## 2021-04-27 PROCEDURE — 85025 COMPLETE CBC W/AUTO DIFF WBC: CPT

## 2021-04-27 PROCEDURE — 80048 BASIC METABOLIC PNL TOTAL CA: CPT

## 2021-04-27 PROCEDURE — 36415 COLL VENOUS BLD VENIPUNCTURE: CPT

## 2021-05-14 ENCOUNTER — TELEPHONE (OUTPATIENT)
Dept: FAMILY MEDICINE CLINIC | Age: 41
End: 2021-05-14

## 2021-06-07 ENCOUNTER — HOSPITAL ENCOUNTER (OUTPATIENT)
Dept: WOUND CARE | Age: 41
Discharge: HOME | End: 2021-06-07
Payer: COMMERCIAL

## 2021-06-07 VITALS
TEMPERATURE: 98.9 F | DIASTOLIC BLOOD PRESSURE: 78 MMHG | RESPIRATION RATE: 16 BRPM | HEART RATE: 98 BPM | SYSTOLIC BLOOD PRESSURE: 121 MMHG

## 2021-06-07 DIAGNOSIS — E11.621 DIABETIC ULCER OF RIGHT MIDFOOT ASSOCIATED WITH TYPE 2 DIABETES MELLITUS, WITH FAT LAYER EXPOSED (HCC): Primary | ICD-10-CM

## 2021-06-07 DIAGNOSIS — E11.621 DIABETIC ULCER OF LEFT MIDFOOT ASSOCIATED WITH TYPE 2 DIABETES MELLITUS, WITH NECROSIS OF MUSCLE (HCC): ICD-10-CM

## 2021-06-07 DIAGNOSIS — L97.412 DIABETIC ULCER OF RIGHT MIDFOOT ASSOCIATED WITH TYPE 2 DIABETES MELLITUS, WITH FAT LAYER EXPOSED (HCC): Primary | ICD-10-CM

## 2021-06-07 DIAGNOSIS — L97.509 DIABETIC FOOT ULCER WITH OSTEOMYELITIS (HCC): ICD-10-CM

## 2021-06-07 DIAGNOSIS — L97.423 DIABETIC ULCER OF LEFT MIDFOOT ASSOCIATED WITH TYPE 2 DIABETES MELLITUS, WITH NECROSIS OF MUSCLE (HCC): ICD-10-CM

## 2021-06-07 DIAGNOSIS — E11.621 DIABETIC FOOT ULCER WITH OSTEOMYELITIS (HCC): ICD-10-CM

## 2021-06-07 DIAGNOSIS — M86.9 DIABETIC FOOT ULCER WITH OSTEOMYELITIS (HCC): ICD-10-CM

## 2021-06-07 DIAGNOSIS — E11.69 DIABETIC FOOT ULCER WITH OSTEOMYELITIS (HCC): ICD-10-CM

## 2021-06-07 PROCEDURE — 11043 DBRDMT MUSC&/FSCA 1ST 20/<: CPT

## 2021-06-07 PROCEDURE — 87070 CULTURE OTHR SPECIMN AEROBIC: CPT

## 2021-06-07 PROCEDURE — 87075 CULTR BACTERIA EXCEPT BLOOD: CPT

## 2021-06-07 PROCEDURE — 87186 SC STD MICRODIL/AGAR DIL: CPT

## 2021-06-07 PROCEDURE — 99213 OFFICE O/P EST LOW 20 MIN: CPT

## 2021-06-07 PROCEDURE — 11055 PARING/CUTG B9 HYPRKER LES 1: CPT

## 2021-06-07 PROCEDURE — 87077 CULTURE AEROBIC IDENTIFY: CPT

## 2021-06-07 RX ORDER — LIDOCAINE HYDROCHLORIDE 40 MG/ML
SOLUTION TOPICAL ONCE
Status: CANCELLED | OUTPATIENT
Start: 2021-06-07 | End: 2021-06-07

## 2021-06-07 RX ORDER — BACITRACIN, NEOMYCIN, POLYMYXIN B 400; 3.5; 5 [USP'U]/G; MG/G; [USP'U]/G
OINTMENT TOPICAL ONCE
Status: CANCELLED | OUTPATIENT
Start: 2021-06-07 | End: 2021-06-07

## 2021-06-07 RX ORDER — LIDOCAINE HYDROCHLORIDE 20 MG/ML
JELLY TOPICAL ONCE
Status: CANCELLED | OUTPATIENT
Start: 2021-06-07 | End: 2021-06-07

## 2021-06-07 RX ORDER — LIDOCAINE 40 MG/G
CREAM TOPICAL ONCE
Status: CANCELLED | OUTPATIENT
Start: 2021-06-07 | End: 2021-06-07

## 2021-06-07 RX ORDER — LIDOCAINE 50 MG/G
OINTMENT TOPICAL ONCE
Status: CANCELLED | OUTPATIENT
Start: 2021-06-07 | End: 2021-06-07

## 2021-06-07 RX ORDER — LIDOCAINE HYDROCHLORIDE 40 MG/ML
SOLUTION TOPICAL ONCE
Status: DISCONTINUED | OUTPATIENT
Start: 2021-06-07 | End: 2021-06-08 | Stop reason: HOSPADM

## 2021-06-07 RX ORDER — BETAMETHASONE DIPROPIONATE 0.05 %
OINTMENT (GRAM) TOPICAL ONCE
Status: CANCELLED | OUTPATIENT
Start: 2021-06-07 | End: 2021-06-07

## 2021-06-07 RX ORDER — BACITRACIN ZINC AND POLYMYXIN B SULFATE 500; 1000 [USP'U]/G; [USP'U]/G
OINTMENT TOPICAL ONCE
Status: CANCELLED | OUTPATIENT
Start: 2021-06-07 | End: 2021-06-07

## 2021-06-07 RX ORDER — GENTAMICIN SULFATE 1 MG/G
OINTMENT TOPICAL ONCE
Status: CANCELLED | OUTPATIENT
Start: 2021-06-07 | End: 2021-06-07

## 2021-06-07 RX ORDER — GINSENG 100 MG
CAPSULE ORAL ONCE
Status: CANCELLED | OUTPATIENT
Start: 2021-06-07 | End: 2021-06-07

## 2021-06-07 RX ORDER — CLOBETASOL PROPIONATE 0.5 MG/G
OINTMENT TOPICAL ONCE
Status: CANCELLED | OUTPATIENT
Start: 2021-06-07 | End: 2021-06-07

## 2021-06-07 RX ORDER — DOXYCYCLINE HYCLATE 100 MG
100 TABLET ORAL 2 TIMES DAILY
Qty: 20 TABLET | Refills: 0 | Status: SHIPPED | OUTPATIENT
Start: 2021-06-07 | End: 2021-06-17

## 2021-06-07 ASSESSMENT — PAIN SCALES - GENERAL: PAINLEVEL_OUTOF10: 9

## 2021-06-07 ASSESSMENT — PAIN - FUNCTIONAL ASSESSMENT: PAIN_FUNCTIONAL_ASSESSMENT: PREVENTS OR INTERFERES SOME ACTIVE ACTIVITIES AND ADLS

## 2021-06-07 ASSESSMENT — PAIN DESCRIPTION - PROGRESSION: CLINICAL_PROGRESSION: GRADUALLY WORSENING

## 2021-06-07 ASSESSMENT — PAIN DESCRIPTION - DESCRIPTORS: DESCRIPTORS: SHARP

## 2021-06-07 ASSESSMENT — PAIN DESCRIPTION - LOCATION: LOCATION: FOOT

## 2021-06-07 ASSESSMENT — PAIN DESCRIPTION - FREQUENCY: FREQUENCY: CONTINUOUS

## 2021-06-07 ASSESSMENT — PAIN DESCRIPTION - ONSET: ONSET: ON-GOING

## 2021-06-07 ASSESSMENT — PAIN DESCRIPTION - ORIENTATION: ORIENTATION: LEFT

## 2021-06-07 ASSESSMENT — PAIN DESCRIPTION - PAIN TYPE: TYPE: CHRONIC PAIN

## 2021-06-07 NOTE — PROGRESS NOTES
[facility] Progress Note With Procedure    Josh Yoon  AGE: 36 y.o. GENDER: male  : 1980  EPISODE DATE:  2021     Subjective:     Chief Complaint   Patient presents with    Wound Check     left foot         HISTORY of PRESENT ILLNESS      Josh Yoon is a 36 y.o. male who presents today for wound evaluation of Chronic diabetic and pressure ulcer(s) of the left medial plantar surface and left third toe. Left third toe is cellulitic and probes to the bone. He does have a sinus tract on the plantar surface of to the third toe. Concerned that he has osteomyelitis. The ulcer is of moderate severity. The underlying cause of the wound is diabetes and pressure.     Wound Pain Timing/Severity: none  Quality of pain: N/A  Severity of pain:  0 / 10   Modifying Factors: diabetes, chronic pressure, shear force and non-adherence  Associated Signs/Symptoms: drainage and odor        PAST MEDICAL HISTORY        Diagnosis Date    Callus of foot     Right foot - see's Dr. Georgia Doherty Diabetic ulcer of left midfoot associated with type 2 diabetes mellitus, with necrosis of muscle ([address] 2021    Diabetic ulcer of right midfoot associated with type 2 diabetes mellitus, with fat layer exposed ([address] 2020    Dizziness     positional    Essential hypertension     Follows with PCP    Hyperlipidemia     Lumbar radiculopathy     Septic embolism ([address] 2020       PAST SURGICAL HISTORY    Past Surgical History:   Procedure Laterality Date    ACHILLES TENDON SURGERY Right 2021    RIGHT ACHILLES TENDON LENGTHENING REPAIR performed by Liu Scott DPM at [address] [facility]  2018    Unalakleet Alvaro    DENTAL SURGERY      teeth extractions -half per patient    HERNIA REPAIR Bilateral 2020    BIALTERAL HERNIA INGUINAL REPAIR performed by Israel Morrison MD at [address] [facility]  2018    [place] OFFICE/OUTPT VISIT,PROCEDURE ONLY Left 2018 L5-S1 HEMILAMINECTOMY, REMOVAL OF DISC LEFT SIDE performed by Elsie Gayle MD at [facility] Right 1/8/2021    RIGHT TRANSMETATARSAL TOE AMPUTATION performed by Narayan Latham DPM at [address] EXTRACTION         FAMILY HISTORY    Family History   Problem Relation Age of Onset    Heart Disease Father     Diabetes Father     Diabetes Mother     Heart Disease Mother     Other Mother     Kidney Disease Mother     Heart Attack Mother        SOCIAL HISTORY    Social History     Tobacco Use    Smoking status: Never Smoker    Smokeless tobacco: Never Used   Vaping Use    Vaping Use: Never used   Substance Use Topics    Alcohol use: Yes     Comment: \"couple beers a night\"    Drug use: No       ALLERGIES    No Known Allergies    MEDICATIONS    Current Outpatient Medications on File Prior to Encounter   Medication Sig Dispense Refill    metFORMIN (GLUCOPHAGE) 500 MG tablet Take 2 tablets by mouth 2 times daily (with meals) Indications: Start tomorrow 03/14 360 tablet 1    FLUoxetine (PROZAC) 40 MG capsule Take 1 capsule by mouth daily 30 capsule 5    lisinopril (PRINIVIL;ZESTRIL) 10 MG tablet Take 1 tablet by mouth daily 30 tablet 5    metoprolol succinate (TOPROL XL) 25 MG extended release tablet Take 1 tablet by mouth daily 30 tablet 5    atorvastatin (LIPITOR) 40 MG tablet Take 1 tablet by mouth nightly 30 tablet 3    glyBURIDE (DIABETA) 5 MG tablet Take 2 tablets by mouth 2 times daily (with meals) 360 tablet 1    omeprazole (PRILOSEC) 20 MG delayed release capsule TAKE 1 CAPSULE BY MOUTH ONCE DAILY IN THE MORNING 90 capsule 1    pioglitazone (ACTOS) 15 MG tablet Take 1 tablet by mouth daily (Patient taking differently: Take 15 mg by mouth daily Pt states PRN) 30 tablet 5    blood glucose monitor kit and supplies Dispense sufficient amount for indicated testing frequency plus additional to accommodate PRN testing needs.  Dispense all needed supplies to include: monitor, strips, lancing device, lancets, control solutions, alcohol swabs. 1 kit 0    blood glucose monitor strips Test 2 times a day & as needed for symptoms of irregular blood glucose. Dispense sufficient amount for indicated testing frequency plus additional to accommodate PRN testing needs. 100 strip 0    FreeStyle Lancets MISC 1 each by Does not apply route daily 100 each 3    aspirin 81 MG tablet Take 81 mg by mouth daily      acetaminophen (TYLENOL) 325 MG tablet Take 2 tablets by mouth every 4 hours as needed for Pain or Fever 120 tablet 3     No current facility-administered medications on file prior to encounter. REVIEW OF SYSTEMS    Pertinent items are noted in HPI. Constitutional: Negative for systemic symptoms including fever, chills and malaise. Objective:      /78   Pulse 98   Temp 98.9 °F (37.2 °C) (Temporal)   Resp 16     PHYSICAL EXAM      General: The patient is in no acute distress. Mental status:  Patient is appropriate, is  oriented to place and plan of care. Dermatologic exam: Visual inspection of the periwound reveals the skin to be moist  Wound exam: see wound description below in procedure note      Assessment:     Problem List Items Addressed This Visit     Diabetic ulcer of right midfoot associated with type 2 diabetes mellitus, with fat layer exposed ([address] - Primary    Relevant Medications    lidocaine (XYLOCAINE) 4 % external solution    Other Relevant Orders    Supply: Wound Cleanser    Diabetic ulcer of left midfoot associated with type 2 diabetes mellitus, with necrosis of muscle (HCC)    RESOLVED: Diabetic foot ulcer with osteomyelitis ([address]    Relevant Orders    Culture, Wound    XR FOOT LEFT (MIN 3 VIEWS)        Procedure Note    Indications:  Based on my examination of this patient's wound(s) today, sharp excision into necrotic muscle/fascia is required to promote healing and evaluate the extent of previous healing.     Performed by: Lou Marsh MD Laura    Consent obtained: Yes    Time out taken:  Yes    Pain Control: Anesthetic  Anesthetic: 5% Lidocaine Ointment Topical     Debridement:Excisional Debridement    Using scissors and forceps the wound(s) was/were sharply debrided down through and including the removal of muscle/fascia.         Devitalized Tissue Debrided:  slough, necrotic/eschar, exudate and callus    Pre Debridement Measurements:  Are located in the Wound Documentation Flow Sheet    All active wounds listed below with today's date are evaluated  Wound(s)    debrided this date include # : 1 and 2     Post  Debridement Measurements:  Incision 04/17/18 Back (Active)   Number of days: 1147       Wound 06/13/20 Tibial Right pt states reddened and rash like after a sunburn 6/11/2020 (Active)   Number of days: 358       Wound 06/07/21 Toe (Comment  which one) Left;Plantar #1 (onset 5 months) Left Third Toe Plantar (Active)   Wound Image   06/07/21 1129   Wound Etiology Diabetic Mathias 2 06/07/21 1129   Wound Cleansed Wound cleanser;Irrigated with saline 06/07/21 1129   Wound Length (cm) 0.5 cm 06/07/21 1129   Wound Width (cm) 1.8 cm 06/07/21 1129   Wound Depth (cm) 0.4 cm 06/07/21 1129   Wound Surface Area (cm^2) 0.9 cm^2 06/07/21 1129   Wound Volume (cm^3) 0.36 cm^3 06/07/21 1129   Post-Procedure Length (cm) 0.5 cm 06/07/21 1143   Post-Procedure Width (cm) 1.8 cm 06/07/21 1143   Post-Procedure Depth (cm) 0.4 cm 06/07/21 1143   Post-Procedure Surface Area (cm^2) 0.9 cm^2 06/07/21 1143   Post-Procedure Volume (cm^3) 0.36 cm^3 06/07/21 1143   Distance Tunneling (cm) 0 cm 06/07/21 1129   Tunneling Position ___ O'Clock 0 06/07/21 1129   Undermining Starts ___ O'Clock 0 06/07/21 1129   Undermining Ends___ O'Clock 0 06/07/21 1129   Undermining Maxium Distance (cm) 0 06/07/21 1129   Wound Assessment Pink/red 06/07/21 1129   Drainage Amount Moderate 06/07/21 1129   Drainage Description Serosanguinous 06/07/21 1129   Odor None 06/07/21 1129   Rosalie-wound Assessment Maceration 06/07/21 1129   Margins Defined edges; Unattached edges 06/07/21 1129   Wound Thickness Description not for Pressure Injury Full thickness 06/07/21 1129   Number of days: 0       Wound 06/07/21 Foot Left;Plantar #2 (onset 5 months) Left Plantar (Active)   Wound Image   06/07/21 1129   Wound Etiology Diabetic Mathias 2 06/07/21 1129   Wound Cleansed Wound cleanser;Irrigated with saline 06/07/21 1129   Wound Length (cm) 1.8 cm 06/07/21 1129   Wound Width (cm) 1.3 cm 06/07/21 1129   Wound Depth (cm) 1.2 cm 06/07/21 1129   Wound Surface Area (cm^2) 2.34 cm^2 06/07/21 1129   Wound Volume (cm^3) 2.81 cm^3 06/07/21 1129   Post-Procedure Length (cm) 1.8 cm 06/07/21 1143   Post-Procedure Width (cm) 1.3 cm 06/07/21 1143   Post-Procedure Depth (cm) 1.2 cm 06/07/21 1143   Post-Procedure Surface Area (cm^2) 2.34 cm^2 06/07/21 1143   Post-Procedure Volume (cm^3) 2.81 cm^3 06/07/21 1143   Distance Tunneling (cm) 0 cm 06/07/21 1129   Tunneling Position ___ O'Clock 0 06/07/21 1129   Undermining Starts ___ O'Clock 0 06/07/21 1129   Undermining Ends___ O'Clock 0 06/07/21 1129   Undermining Maxium Distance (cm) 0 06/07/21 1129   Wound Assessment Slough 06/07/21 1129   Drainage Amount Large 06/07/21 1129   Drainage Description Purulent;Yellow 06/07/21 1129   Odor Moderate 06/07/21 1129   Rosalie-wound Assessment Hyperkeratosis (callous); Maceration 06/07/21 1129   Margins Defined edges; Unattached edges 06/07/21 1129   Wound Thickness Description not for Pressure Injury Full thickness 06/07/21 1129   Number of days: 0       Percent of Wound(s) Debrided: approximately 100%    Total  Area  Debrided:  4 sq cm     Bleeding:  Minimal    Hemostasis Achieved:  not needed    Procedural Pain:  0  / 10     Post Procedural Pain:  0 / 10     Response to treatment:  Well tolerated by patient. Status of wound progress and description from last visit:   Has worsened. I did take a wound culture and it was packed with iodoform gauze. Time____________        Treatment Note      Written Patient Dismissal Instructions Given            Electronically signed by Justin Duffy MD on 6/7/2021 at 12:00 PM

## 2021-06-08 ENCOUNTER — HOSPITAL ENCOUNTER (OUTPATIENT)
Age: 41
Discharge: HOME | End: 2021-06-08
Payer: COMMERCIAL

## 2021-06-08 ENCOUNTER — HOSPITAL ENCOUNTER (OUTPATIENT)
Dept: GENERAL RADIOLOGY | Age: 41
Discharge: HOME | End: 2021-06-08
Payer: COMMERCIAL

## 2021-06-08 DIAGNOSIS — E11.69 DIABETIC FOOT ULCER WITH OSTEOMYELITIS (HCC): ICD-10-CM

## 2021-06-08 DIAGNOSIS — E11.621 DIABETIC FOOT ULCER WITH OSTEOMYELITIS (HCC): ICD-10-CM

## 2021-06-08 DIAGNOSIS — L97.509 DIABETIC FOOT ULCER WITH OSTEOMYELITIS (HCC): ICD-10-CM

## 2021-06-08 DIAGNOSIS — M86.9 DIABETIC FOOT ULCER WITH OSTEOMYELITIS (HCC): ICD-10-CM

## 2021-06-08 PROCEDURE — 73630 X-RAY EXAM OF FOOT: CPT

## 2021-06-09 ENCOUNTER — OFFICE VISIT (OUTPATIENT)
Dept: FAMILY MEDICINE CLINIC | Age: 41
End: 2021-06-09
Payer: COMMERCIAL

## 2021-06-09 ENCOUNTER — HOSPITAL ENCOUNTER (OUTPATIENT)
Age: 41
Setting detail: SPECIMEN
Discharge: HOME | End: 2021-06-09
Payer: COMMERCIAL

## 2021-06-09 ENCOUNTER — HOSPITAL ENCOUNTER (OUTPATIENT)
Dept: WOUND CARE | Age: 41
Discharge: HOME | End: 2021-06-09
Payer: COMMERCIAL

## 2021-06-09 VITALS
HEART RATE: 91 BPM | RESPIRATION RATE: 16 BRPM | DIASTOLIC BLOOD PRESSURE: 83 MMHG | SYSTOLIC BLOOD PRESSURE: 154 MMHG | TEMPERATURE: 97.6 F

## 2021-06-09 VITALS — OXYGEN SATURATION: 97 % | TEMPERATURE: 97.2 F | HEART RATE: 104 BPM

## 2021-06-09 DIAGNOSIS — R53.83 FATIGUE, UNSPECIFIED TYPE: ICD-10-CM

## 2021-06-09 DIAGNOSIS — J02.9 SORE THROAT: ICD-10-CM

## 2021-06-09 DIAGNOSIS — M79.10 MUSCLE ACHE: ICD-10-CM

## 2021-06-09 DIAGNOSIS — R51.9 GENERALIZED HEADACHE: ICD-10-CM

## 2021-06-09 DIAGNOSIS — R09.81 NASAL CONGESTION: ICD-10-CM

## 2021-06-09 DIAGNOSIS — R05.9 COUGH: Primary | ICD-10-CM

## 2021-06-09 DIAGNOSIS — R11.2 NAUSEA AND VOMITING, INTRACTABILITY OF VOMITING NOT SPECIFIED, UNSPECIFIED VOMITING TYPE: ICD-10-CM

## 2021-06-09 DIAGNOSIS — L97.423 DIABETIC ULCER OF LEFT MIDFOOT ASSOCIATED WITH TYPE 2 DIABETES MELLITUS, WITH NECROSIS OF MUSCLE (HCC): Primary | ICD-10-CM

## 2021-06-09 DIAGNOSIS — R09.89 CHEST CONGESTION: ICD-10-CM

## 2021-06-09 DIAGNOSIS — E11.621 DIABETIC ULCER OF LEFT MIDFOOT ASSOCIATED WITH TYPE 2 DIABETES MELLITUS, WITH NECROSIS OF MUSCLE (HCC): Primary | ICD-10-CM

## 2021-06-09 PROCEDURE — U0005 INFEC AGEN DETEC AMPLI PROBE: HCPCS

## 2021-06-09 PROCEDURE — 99213 OFFICE O/P EST LOW 20 MIN: CPT

## 2021-06-09 PROCEDURE — G8420 CALC BMI NORM PARAMETERS: HCPCS | Performed by: NURSE PRACTITIONER

## 2021-06-09 PROCEDURE — 1036F TOBACCO NON-USER: CPT | Performed by: NURSE PRACTITIONER

## 2021-06-09 PROCEDURE — 99213 OFFICE O/P EST LOW 20 MIN: CPT | Performed by: NURSE PRACTITIONER

## 2021-06-09 PROCEDURE — G8427 DOCREV CUR MEDS BY ELIG CLIN: HCPCS | Performed by: NURSE PRACTITIONER

## 2021-06-09 PROCEDURE — U0003 INFECTIOUS AGENT DETECTION BY NUCLEIC ACID (DNA OR RNA); SEVERE ACUTE RESPIRATORY SYNDROME CORONAVIRUS 2 (SARS-COV-2) (CORONAVIRUS DISEASE [COVID-19]), AMPLIFIED PROBE TECHNIQUE, MAKING USE OF HIGH THROUGHPUT TECHNOLOGIES AS DESCRIBED BY CMS-2020-01-R: HCPCS

## 2021-06-09 RX ORDER — LIDOCAINE 50 MG/G
OINTMENT TOPICAL ONCE
Status: CANCELLED | OUTPATIENT
Start: 2021-06-09 | End: 2021-06-09

## 2021-06-09 RX ORDER — LIDOCAINE HYDROCHLORIDE 40 MG/ML
SOLUTION TOPICAL ONCE
Status: CANCELLED | OUTPATIENT
Start: 2021-06-09 | End: 2021-06-09

## 2021-06-09 RX ORDER — BETAMETHASONE DIPROPIONATE 0.05 %
OINTMENT (GRAM) TOPICAL ONCE
Status: CANCELLED | OUTPATIENT
Start: 2021-06-09 | End: 2021-06-09

## 2021-06-09 RX ORDER — BENZONATATE 100 MG/1
100 CAPSULE ORAL 3 TIMES DAILY PRN
Qty: 20 CAPSULE | Refills: 0 | Status: SHIPPED | OUTPATIENT
Start: 2021-06-09 | End: 2021-08-09

## 2021-06-09 RX ORDER — CLOBETASOL PROPIONATE 0.5 MG/G
OINTMENT TOPICAL ONCE
Status: CANCELLED | OUTPATIENT
Start: 2021-06-09 | End: 2021-06-09

## 2021-06-09 RX ORDER — BACITRACIN, NEOMYCIN, POLYMYXIN B 400; 3.5; 5 [USP'U]/G; MG/G; [USP'U]/G
OINTMENT TOPICAL ONCE
Status: CANCELLED | OUTPATIENT
Start: 2021-06-09 | End: 2021-06-09

## 2021-06-09 RX ORDER — LIDOCAINE HYDROCHLORIDE 20 MG/ML
JELLY TOPICAL ONCE
Status: CANCELLED | OUTPATIENT
Start: 2021-06-09 | End: 2021-06-09

## 2021-06-09 RX ORDER — LIDOCAINE 40 MG/G
CREAM TOPICAL ONCE
Status: CANCELLED | OUTPATIENT
Start: 2021-06-09 | End: 2021-06-09

## 2021-06-09 RX ORDER — ONDANSETRON 4 MG/1
4 TABLET, ORALLY DISINTEGRATING ORAL EVERY 8 HOURS PRN
Qty: 15 TABLET | Refills: 0 | Status: SHIPPED | OUTPATIENT
Start: 2021-06-09 | End: 2022-01-24

## 2021-06-09 RX ORDER — GINSENG 100 MG
CAPSULE ORAL ONCE
Status: CANCELLED | OUTPATIENT
Start: 2021-06-09 | End: 2021-06-09

## 2021-06-09 RX ORDER — BACITRACIN ZINC AND POLYMYXIN B SULFATE 500; 1000 [USP'U]/G; [USP'U]/G
OINTMENT TOPICAL ONCE
Status: CANCELLED | OUTPATIENT
Start: 2021-06-09 | End: 2021-06-09

## 2021-06-09 RX ORDER — GENTAMICIN SULFATE 1 MG/G
OINTMENT TOPICAL ONCE
Status: CANCELLED | OUTPATIENT
Start: 2021-06-09 | End: 2021-06-09

## 2021-06-09 ASSESSMENT — PAIN DESCRIPTION - FREQUENCY: FREQUENCY: INTERMITTENT

## 2021-06-09 ASSESSMENT — PAIN DESCRIPTION - PAIN TYPE: TYPE: ACUTE PAIN

## 2021-06-09 ASSESSMENT — PAIN - FUNCTIONAL ASSESSMENT: PAIN_FUNCTIONAL_ASSESSMENT: PREVENTS OR INTERFERES SOME ACTIVE ACTIVITIES AND ADLS

## 2021-06-09 ASSESSMENT — PAIN DESCRIPTION - ORIENTATION: ORIENTATION: RIGHT

## 2021-06-09 ASSESSMENT — PAIN DESCRIPTION - DESCRIPTORS: DESCRIPTORS: SHARP

## 2021-06-09 ASSESSMENT — PAIN DESCRIPTION - ONSET: ONSET: ON-GOING

## 2021-06-09 ASSESSMENT — PAIN DESCRIPTION - LOCATION: LOCATION: TOE (COMMENT WHICH ONE)

## 2021-06-09 ASSESSMENT — PAIN DESCRIPTION - PROGRESSION: CLINICAL_PROGRESSION: GRADUALLY IMPROVING

## 2021-06-09 ASSESSMENT — PAIN SCALES - GENERAL: PAINLEVEL_OUTOF10: 2

## 2021-06-09 NOTE — PLAN OF CARE
Problem: Pain:  Goal: Pain level will decrease  Outcome: Ongoing  Goal: Control of acute pain  Outcome: Ongoing  Goal: Control of chronic pain  Outcome: Ongoing     Problem: Wound:  Intervention: Assess pain status  Note: See flow sheet  Intervention: Assess wound size, appearance and drainage  Note: See flow sheet  Intervention: Assess pedal pulses bilaterally if patient has a foot or leg ulcer  Note: See flow sheet

## 2021-06-09 NOTE — PROGRESS NOTES
6/9/2021    HPI:  Chief complaint and history of present illness as per medical assistant/nurse documented today in the Flu/COVID-19 clinic. Patient is here with complaints of cough, chest/nasal congestion, SOB at times, chest heaviness with cough, headache, sore throat, muscle aches, nausea/vomiting, and fatigue x 2 days. Patient is currently taking doxycycline for a wound infection. MEDICATIONS:  Prior to Visit Medications    Medication Sig Taking?  Authorizing Provider   guaiFENesin (MUCINEX) 600 MG extended release tablet Take 1 tablet by mouth 2 times daily Yes RAFAEL Osuna CNP   benzonatate (TESSALON PERLES) 100 MG capsule Take 1 capsule by mouth 3 times daily as needed for Cough Yes RAFAEL Osuna CNP   ondansetron (ZOFRAN-ODT) 4 MG disintegrating tablet Take 1 tablet by mouth every 8 hours as needed for Nausea or Vomiting Yes RAFAEL Osuna CNP   doxycycline hyclate (VIBRA-TABS) 100 MG tablet Take 1 tablet by mouth 2 times daily for 10 days  Rc Hill MD   metFORMIN (GLUCOPHAGE) 500 MG tablet Take 2 tablets by mouth 2 times daily (with meals) Indications: Start tomorrow 03/14  Laney Mcneal MD   FLUoxetine (PROZAC) 40 MG capsule Take 1 capsule by mouth daily  Laney Mcneal MD   lisinopril (PRINIVIL;ZESTRIL) 10 MG tablet Take 1 tablet by mouth daily  Laney Mcneal MD   metoprolol succinate (TOPROL XL) 25 MG extended release tablet Take 1 tablet by mouth daily  Laney Mcneal MD   atorvastatin (LIPITOR) 40 MG tablet Take 1 tablet by mouth nightly  Subha Haile MD   glyBURIDE (DIABETA) 5 MG tablet Take 2 tablets by mouth 2 times daily (with meals)  Austyn Hernandez PA-C   omeprazole (PRILOSEC) 20 MG delayed release capsule TAKE 1 CAPSULE BY MOUTH ONCE DAILY IN THE MORNING  Julián Lugo PA-C   pioglitazone (ACTOS) 15 MG tablet Take 1 tablet by mouth daily  Patient taking differently: Take 15 mg by mouth daily Pt states PRN  Layla Medina Helder Chanel MD   blood glucose monitor kit and supplies Dispense sufficient amount for indicated testing frequency plus additional to accommodate PRN testing needs. Dispense all needed supplies to include: monitor, strips, lancing device, lancets, control solutions, alcohol swabs. Nini Gibbs MD   blood glucose monitor strips Test 2 times a day & as needed for symptoms of irregular blood glucose. Dispense sufficient amount for indicated testing frequency plus additional to accommodate PRN testing needs.   Nini Gibbs MD   FreeStyle Lancets MISC 1 each by Does not apply route daily  Nini Gibbs MD   aspirin 81 MG tablet Take 81 mg by mouth daily  Historical Provider, MD   acetaminophen (TYLENOL) 325 MG tablet Take 2 tablets by mouth every 4 hours as needed for Pain or Fever  Abigail Gaming MD       No Known Allergies,   Past Medical History:   Diagnosis Date    Callus of foot     Right foot - see's Dr. Azul Hansen Diabetic ulcer of left midfoot associated with type 2 diabetes mellitus, with necrosis of muscle ([address] 6/7/2021    Diabetic ulcer of right midfoot associated with type 2 diabetes mellitus, with fat layer exposed ([address] 8/11/2020    Dizziness     positional    Essential hypertension     Follows with PCP    Hyperlipidemia     Lumbar radiculopathy     Septic embolism ([address] 6/13/2020   ,   Past Surgical History:   Procedure Laterality Date    ACHILLES TENDON SURGERY Right 1/8/2021    RIGHT ACHILLES TENDON LENGTHENING REPAIR performed by Miguel A Estrella DPM at [address] [facility] [address]  03/2018    [facility]    DENTAL SURGERY      teeth extractions -half per patient    HERNIA REPAIR Bilateral 5/27/2020    BIALTERAL HERNIA INGUINAL REPAIR performed by Chely Miller MD at [address] [place] [address]  2018    [place] OFFICE/OUTPT VISIT,PROCEDURE ONLY Left 4/17/2018    L5-S1 HEMILAMINECTOMY, REMOVAL OF DISC LEFT SIDE performed by Kevin Thompson MD at [facility]  TOE AMPUTATION Right 1/8/2021    RIGHT TRANSMETATARSAL TOE AMPUTATION performed by Christian Arevalo DPM at [address] EXTRACTION     ,   Social History     Tobacco Use    Smoking status: Never Smoker    Smokeless tobacco: Never Used   Vaping Use    Vaping Use: Never used   Substance Use Topics    Alcohol use: Yes     Comment: \"couple beers a night\"    Drug use: No   ,   Family History   Problem Relation Age of Onset    Heart Disease Father     Diabetes Father     Diabetes Mother     Heart Disease Mother     Other Mother     Kidney Disease Mother     Heart Attack Mother    ,   Immunization History   Administered Date(s) Administered    Influenza A (O1M8-11) Vaccine PF IM 10/28/2009    Influenza Virus Vaccine 10/12/2018    Influenza, Navya Vieiraman, IM, PF (6 mo and older Fluzone, Flulaval, Fluarix, and 3 yrs and older Afluria) 10/23/2019, 09/23/2020    Tdap (Boostrix, Adacel) 11/04/2016   ,   Health Maintenance   Topic Date Due    Hepatitis C screen  Never done    Varicella vaccine (1 of 2 - 2-dose childhood series) Never done    Pneumococcal 0-64 years Vaccine (1 of 2 - PPSV23) Never done    COVID-19 Vaccine (1) Never done    HIV screen  Never done    Hepatitis B vaccine (1 of 3 - Risk 3-dose series) Never done    Diabetic microalbuminuria test  06/25/2019    Diabetic foot exam  03/11/2021    Diabetic retinal exam  12/02/2021    A1C test (Diabetic or Prediabetic)  01/04/2022    Lipid screen  03/13/2022    Potassium monitoring  04/27/2022    Creatinine monitoring  04/27/2022    DTaP/Tdap/Td vaccine (2 - Td or Tdap) 11/04/2026    Flu vaccine  Completed    Hepatitis A vaccine  Aged Out    Hib vaccine  Aged Out    Meningococcal (ACWY) vaccine  Aged Out       PHYSICAL EXAM:  Physical Exam  Constitutional:       Appearance: Normal appearance. HENT:      Head: Normocephalic.       Right Ear: Tympanic membrane, ear canal and external ear normal.      Left Ear: Tympanic vomiting type  - COVID-19 Ambulatory  - ondansetron (ZOFRAN-ODT) 4 MG disintegrating tablet; Take 1 tablet by mouth every 8 hours as needed for Nausea or Vomiting  Dispense: 15 tablet; Refill: 0    8. Fatigue, unspecified type  - COVID-19 Ambulatory      FOLLOW-UP:  Return if symptoms worsen or fail to improve.     In addition to other information, the printed after visit summary provided to the patient includes:  [x] COVID-19 Self care instructions  [x] COVID-19 General patient information

## 2021-06-09 NOTE — PROGRESS NOTES
Wound Care Center Progress Note       Cindy Bautista  AGE: 36 y.o. GENDER: male  : 1980  TODAY'S DATE:  2021        Subjective:     Chief Complaint   Patient presents with    Wound Check     LLE         HISTORY of PRESENT ILLNESS     Cindy Bautista is a 36 y.o. male who presents today for wound evaluation of Chronic diabetic and pressure ulcer(s) of left third toe and medial aspect of the left plantar surface. The ulcer is of moderate severity. The underlying cause of the wound is diabetes and pressure. He did get a foot x-ray with the following findings:    Impression   Osseous erosion and destruction involving the 3rd metatarsal head and base of   the proximal phalanx, consistent with osteomyelitis.  There is edema in the   overlying soft tissues. Brianne Kurt is also minimal lucency in the 4th metatarsal   head, which is concerning for early osteomyelitis in that digit. His wound cultures are still not back yet but he has started his antibiotics.     Wound Pain Timing/Severity: none  Quality of pain: N/A  Severity of pain:  0 / 10   Modifying Factors: diabetes, poor glucose control, chronic pressure and non-adherence  Associated Signs/Symptoms: erythema, drainage and odor        PAST MEDICAL HISTORY        Diagnosis Date    Callus of foot     Right foot - see's Dr. Mary Ann Murphy Diabetic ulcer of left midfoot associated with type 2 diabetes mellitus, with necrosis of muscle ([address] 2021    Diabetic ulcer of right midfoot associated with type 2 diabetes mellitus, with fat layer exposed ([address] 2020    Dizziness     positional    Essential hypertension     Follows with PCP    Hyperlipidemia     Lumbar radiculopathy     Septic embolism ([address] 2020       PAST SURGICAL HISTORY    Past Surgical History:   Procedure Laterality Date    ACHILLES TENDON SURGERY Right 2021    RIGHT ACHILLES TENDON LENGTHENING REPAIR performed by Blossom Stockton DPM at [address] CATHETERIZATION  03/2018    [facility]    DENTAL SURGERY      teeth extractions -half per patient    HERNIA REPAIR Bilateral 5/27/2020    BIALTERAL HERNIA INGUINAL REPAIR performed by Gabriel Gutierrez MD at [address] [facility]  2018    TN OFFICE/OUTPT VISIT,PROCEDURE ONLY Left 4/17/2018    L5-S1 HEMILAMINECTOMY, REMOVAL OF DISC LEFT SIDE performed by Giselle Bhatt MD at [address] Right 1/8/2021    RIGHT TRANSMETATARSAL TOE AMPUTATION performed by Khang Zelaya DPM at [address] EXTRACTION         FAMILY HISTORY    Family History   Problem Relation Age of Onset    Heart Disease Father     Diabetes Father     Diabetes Mother     Heart Disease Mother     Other Mother     Kidney Disease Mother     Heart Attack Mother        SOCIAL HISTORY    Social History     Tobacco Use    Smoking status: Never Smoker    Smokeless tobacco: Never Used   Vaping Use    Vaping Use: Never used   Substance Use Topics    Alcohol use: Yes     Comment: \"couple beers a night\"    Drug use: No       ALLERGIES    No Known Allergies    MEDICATIONS    Current Outpatient Medications on File Prior to Encounter   Medication Sig Dispense Refill    doxycycline hyclate (VIBRA-TABS) 100 MG tablet Take 1 tablet by mouth 2 times daily for 10 days 20 tablet 0    metFORMIN (GLUCOPHAGE) 500 MG tablet Take 2 tablets by mouth 2 times daily (with meals) Indications: Start tomorrow 03/14 360 tablet 1    FLUoxetine (PROZAC) 40 MG capsule Take 1 capsule by mouth daily 30 capsule 5    lisinopril (PRINIVIL;ZESTRIL) 10 MG tablet Take 1 tablet by mouth daily 30 tablet 5    metoprolol succinate (TOPROL XL) 25 MG extended release tablet Take 1 tablet by mouth daily 30 tablet 5    atorvastatin (LIPITOR) 40 MG tablet Take 1 tablet by mouth nightly 30 tablet 3    glyBURIDE (DIABETA) 5 MG tablet Take 2 tablets by mouth 2 times daily (with meals) 360 tablet 1    omeprazole (PRILOSEC) 20 MG delayed release capsule TAKE 1 CAPSULE BY MOUTH ONCE DAILY IN THE MORNING 90 capsule 1    pioglitazone (ACTOS) 15 MG tablet Take 1 tablet by mouth daily (Patient taking differently: Take 15 mg by mouth daily Pt states PRN) 30 tablet 5    blood glucose monitor kit and supplies Dispense sufficient amount for indicated testing frequency plus additional to accommodate PRN testing needs. Dispense all needed supplies to include: monitor, strips, lancing device, lancets, control solutions, alcohol swabs. 1 kit 0    blood glucose monitor strips Test 2 times a day & as needed for symptoms of irregular blood glucose. Dispense sufficient amount for indicated testing frequency plus additional to accommodate PRN testing needs. 100 strip 0    FreeStyle Lancets MISC 1 each by Does not apply route daily 100 each 3    aspirin 81 MG tablet Take 81 mg by mouth daily      acetaminophen (TYLENOL) 325 MG tablet Take 2 tablets by mouth every 4 hours as needed for Pain or Fever 120 tablet 3     No current facility-administered medications on file prior to encounter. REVIEW OF SYSTEMS    Pertinent items are noted in HPI. Constitutional: Negative for systemic symptoms including fever, chills and malaise. Objective:      BP (!) 154/83   Pulse 91   Temp 97.6 °F (36.4 °C) (Temporal)   Resp 16     PHYSICAL EXAM      General: The patient is in no acute distress. Mental status:  Patient is appropriate, is  oriented to place and plan of care.   Dermatologic exam: Visual inspection of the periwound reveals the skin to be moist.  Wound exam:  see wound description below     All active wounds listed below with today's date are evaluated      Incision 04/17/18 Back (Active)   Number of days: 1149       Wound 06/13/20 Tibial Right pt states reddened and rash like after a sunburn 6/11/2020 (Active)   Number of days: 360       Wound 06/07/21 Toe (Comment  which one) Left;Plantar #1 (onset 5 months) Left Third Toe Plantar (Active)   Wound Image   06/07/21 1129   Wound Etiology Diabetic Mathias 2 06/09/21 6450   Dressing Status New dressing applied;Clean;Dry; Intact 06/09/21 0938   Wound Cleansed Vashe;Irrigated with saline 06/09/21 0909   Offloading for Diabetic Foot Ulcers Forefoot offloading shoe 06/09/21 0909   Wound Length (cm) 0.6 cm 06/09/21 0909   Wound Width (cm) 2 cm 06/09/21 0909   Wound Depth (cm) 0.7 cm 06/09/21 0909   Wound Surface Area (cm^2) 1.2 cm^2 06/09/21 0909   Change in Wound Size % (l*w) -33.33 06/09/21 0909   Wound Volume (cm^3) 0.84 cm^3 06/09/21 0909   Wound Healing % -133 06/09/21 0909   Post-Procedure Length (cm) 0.5 cm 06/07/21 1143   Post-Procedure Width (cm) 1.8 cm 06/07/21 1143   Post-Procedure Depth (cm) 0.4 cm 06/07/21 1143   Post-Procedure Surface Area (cm^2) 0.9 cm^2 06/07/21 1143   Post-Procedure Volume (cm^3) 0.36 cm^3 06/07/21 1143   Distance Tunneling (cm) 0 cm 06/09/21 0909   Tunneling Position ___ O'Clock 0 06/09/21 0909   Undermining Starts ___ O'Clock 0400 06/09/21 0909   Undermining Ends___ O'Clock 0900 06/09/21 0909   Undermining Maxium Distance (cm) 1.0 06/09/21 0909   Wound Assessment Pink/red;Granulation tissue 06/09/21 0909   Drainage Amount Moderate 06/09/21 0909   Drainage Description Serosanguinous 06/09/21 0909   Odor None 06/09/21 0909   Rosalie-wound Assessment Maceration 06/09/21 0909   Margins Defined edges; Unattached edges 06/09/21 0909   Wound Thickness Description not for Pressure Injury Full thickness 06/09/21 0909   Number of days: 1       Wound 06/07/21 Foot Left;Plantar #2 (onset 5 months) Left Plantar (Active)   Wound Image   06/07/21 1129   Wound Etiology Diabetic Mathias 2 06/09/21 5506   Dressing Status New dressing applied;Clean;Dry; Intact 06/09/21 0938   Wound Cleansed Vashe;Irrigated with saline 06/09/21 0909   Offloading for Diabetic Foot Ulcers Forefoot offloading shoe 06/09/21 0909   Wound Length (cm) 0.9 cm 06/09/21 0909   Wound Width (cm) 0.5 cm 06/09/21 0909   Wound Depth (cm) 1.3 cm 06/09/21 0909   Wound Surface Area (cm^2) 0.45 cm^2 06/09/21 0909   Change in Wound Size % (l*w) 80.77 06/09/21 0909   Wound Volume (cm^3) 0.58 cm^3 06/09/21 0909   Wound Healing % 79 06/09/21 0909   Post-Procedure Length (cm) 1.8 cm 06/07/21 1143   Post-Procedure Width (cm) 1.3 cm 06/07/21 1143   Post-Procedure Depth (cm) 1.2 cm 06/07/21 1143   Post-Procedure Surface Area (cm^2) 2.34 cm^2 06/07/21 1143   Post-Procedure Volume (cm^3) 2.81 cm^3 06/07/21 1143   Distance Tunneling (cm) 0 cm 06/07/21 1129   Tunneling Position ___ O'Clock 0 06/07/21 1129   Undermining Starts ___ O'Clock 1200 06/09/21 0909   Undermining Ends___ O'Clock 1200 06/09/21 0909   Undermining Maxium Distance (cm) 0.3 06/09/21 0909   Wound Assessment Pink/red;Granulation tissue 06/09/21 0909   Drainage Amount Large 06/09/21 0909   Drainage Description Serosanguinous 06/09/21 0909   Odor None 06/09/21 0909   Rosalie-wound Assessment Hyperkeratosis (callous); Maceration 06/09/21 0909   Margins Defined edges; Unattached edges 06/09/21 0909   Wound Thickness Description not for Pressure Injury Full thickness 06/09/21 0909   Number of days: 1       Assessment:       Problem List Items Addressed This Visit     Diabetic ulcer of left midfoot associated with type 2 diabetes mellitus, with necrosis of muscle ([facility] - Primary    Relevant Orders    Initiate Outpatient Wound Care Protocol          Status of wound progress and description from last visit:   Has improved in the last 48 hours. I did remove and replace his iodoform packing. I will follow this closely, but he still may need a toe amputation if this is not improved based upon his x-ray findings. Plan:     Discharge Instructions       PHYSICIAN ORDERS AND DISCHARGE INSTRUCTIONS     NOTE: Upon discharge from the [address], you will receive a patient experience survey.  We would be grateful if you would take the time to fill this survey out.     Wound care order history:                 YADY's Right       Left               Date:              Cultures:                Grafts:                Antibiotics:           Continuing wound care orders and information:                 Residence:                Continue home health care with:               Your wound-care supplies will be provided by:      Wound cleansing:               Do not scrub or use excessive force. Wash hands with soap and water before and after dressing changes. Prior to applying a clean dressing, cleanse wound with normal saline, wound cleanser, or mild soap and water. Ask the physician or nurse before getting the wound(s) wet in a shower     Daily Wound management:              Keep weight off wounds and reposition every 2 hours. Avoid standing for long periods of time. Apply wraps/stockings in AM and remove at bedtime. If swelling is present, elevate legs to the level of the heart or above for 30 minutes 4-5 times a day and/or when sitting. When taking antibiotics take entire prescription as ordered by physician do not stop taking until medicine is all gone.                                                           Orders for this week:  06/09/21                  Left 3rd toe and Plantar Foot-- Clean with soap and water, pat dry. Pack with 1/4 inch Iodoform gauze, cover with ABD Pad. Wrap with conform and secure with tape. Secure with Coban wrap and grippy sock. Leave in place until appointment on Wednesday.      Give Forefoot Offloader 6/7/21  X-ray ordered 6/7/21  Referral to Dr Jordi Poon 6/7/21     Follow up with Dr Archana Lora on Wednesday in the wound care center  Call [phone] for any questions or concerns.   Date__________   Time____________        Treatment Note Wound 06/07/21 Toe (Comment  which one) Left;Plantar #1 (onset 5 months) Left Third Toe Plantar-Dressing/Treatment:  (1/4\" iodoform, ABD, conform tape coban )  Wound 06/07/21 Foot Left;Plantar #2 (onset 5 months) Left Plantar-Dressing/Treatment:  (1/4\" iodoform, ABD, conform tape coban )    Written Patient Dismissal Instructions Given            Electronically signed by Mulugeta Berkowitz MD on 6/9/2021 at 9:59 AM

## 2021-06-09 NOTE — PROGRESS NOTES
6/9/21  Jose L Chaudhry  1980    FLU/COVID-19 CLINIC EVALUATION    HPI SYMPTOMS:    Employer: Unemployed    [x] Fevers  [x] Chills  [x] Cough  [x] Coughing up blood  [x] Chest Congestion  [x] Nasal Congestion  [x] Feeling short of breath  [x] Sometimes  [] Frequently  [] All the time  [] Chest pain  [x] Headaches  [x]Tolerable  [] Severe  [x] Sore throat  [x] Muscle aches  [x] Nausea  [x] Vomiting  []Unable to keep fluids down  [] Diarrhea  []Severe    [x] OTHER SYMPTOMS:  Fatigue, anorexia  Symptom Duration:   [] 1  [x] 2   [] 3   [] 4    [] 5   [] 6   [] 7   [] 8   [] 9   [] 10   [] 11   [] 12   [] 13   [] 14   [] Longer than 14 days    Symptom course:   [x] Worsening     [] Stable     [] Improving    RISK FACTORS:    [] Pregnant or possibly pregnant  [] Age over 61  [x] Diabetes  [] Heart disease  [] Asthma  [] COPD/Other chronic lung diseases  [] Active Cancer  [] On Chemotherapy  [] Taking oral steroids  [] History Lymphoma/Leukemia  [] Close contact with a lab confirmed COVID-19 patient within 14 days of symptom onset  [] History of travel from affected geographical areas within 14 days of symptom onset       VITALS:  There were no vitals filed for this visit. TESTS:    POCT FLU:  [] Positive     []Negative    ASSESSMENT:    [] Flu  [] Possible COVID-19  [] Strep    PLAN:    [] Discharge home with written instructions for:  [] Flu management  [] Possible COVID-19 infection with self-quarantine and management of symptoms  [] Follow-up with primary care physician or emergency department if worsens  [] Evaluation per physician/NP/PA in clinic  [] Sent to ER       An  electronic signature was used to authenticate this note.      --Corry Taylor LPN on 6/1/4058 at 8:95 PM

## 2021-06-09 NOTE — PATIENT INSTRUCTIONS
Your COVID 19 test can take 3-5 days for the results to come back. We ask that you make a Mychart page and view your test results this way. You will need to Self quarantine until you know your results. Increase fluids and rest  Saline nasal spray as needed for nasal congestion  Warm salt gargles as needed for throat discomfort  Monitor temperature twice a day  Tylenol as needed for fevers and/or discomfort. Big deep breaths periodically throughout the day  Regular Mucinex over the counter as needed for chest congestion - sent as script to pharmacy  If symptoms worsen -Go to the ER. Follow up with your primary care provider      To Whom it May Concern:    German Keen was tested for COVID-19 6/9/2021. He/she must stay home until test results are back. If test is positive, he/she must quarantine for a total of 10 days starting from day one of symptom onset. He/she must also be fever-free for 24 hours at that time, and also have improvement in symptoms. We do not recommend retesting as patients may continue to test positive for months even though no longer contagious. It is suggested you call [address] or [address] [place] with any questions regarding quarantine timeframe/return to work/school details.

## 2021-06-10 LAB
SARS-COV-2: NOT DETECTED
SOURCE: NORMAL

## 2021-06-12 ENCOUNTER — APPOINTMENT (OUTPATIENT)
Dept: CT IMAGING | Age: 41
End: 2021-06-12
Payer: COMMERCIAL

## 2021-06-12 ENCOUNTER — HOSPITAL ENCOUNTER (EMERGENCY)
Age: 41
Discharge: ANOTHER HOSPITAL | End: 2021-06-13
Attending: EMERGENCY MEDICINE
Payer: COMMERCIAL

## 2021-06-12 ENCOUNTER — APPOINTMENT (OUTPATIENT)
Dept: GENERAL RADIOLOGY | Age: 41
End: 2021-06-12
Payer: COMMERCIAL

## 2021-06-12 VITALS
HEART RATE: 94 BPM | RESPIRATION RATE: 24 BRPM | WEIGHT: 170 LBS | HEIGHT: 72 IN | TEMPERATURE: 98.2 F | DIASTOLIC BLOOD PRESSURE: 98 MMHG | BODY MASS INDEX: 23.03 KG/M2 | SYSTOLIC BLOOD PRESSURE: 161 MMHG | OXYGEN SATURATION: 99 %

## 2021-06-12 DIAGNOSIS — R07.9 CHEST PAIN, UNSPECIFIED TYPE: ICD-10-CM

## 2021-06-12 DIAGNOSIS — A41.9 SEPTICEMIA (HCC): Primary | ICD-10-CM

## 2021-06-12 DIAGNOSIS — R10.9 ABDOMINAL PAIN, UNSPECIFIED ABDOMINAL LOCATION: ICD-10-CM

## 2021-06-12 DIAGNOSIS — R05.9 COUGH: ICD-10-CM

## 2021-06-12 DIAGNOSIS — M86.9 OSTEOMYELITIS OF LEFT FOOT, UNSPECIFIED TYPE (HCC): ICD-10-CM

## 2021-06-12 LAB
ALBUMIN SERPL-MCNC: 3.7 GM/DL (ref 3.4–5)
ALP BLD-CCNC: 86 IU/L (ref 40–129)
ALT SERPL-CCNC: 8 U/L (ref 10–40)
ANION GAP SERPL CALCULATED.3IONS-SCNC: 13 MMOL/L (ref 4–16)
APTT: 22.9 SECONDS (ref 25.1–37.1)
AST SERPL-CCNC: 12 IU/L (ref 15–37)
BACTERIA: ABNORMAL /HPF
BASOPHILS ABSOLUTE: 0.1 K/CU MM
BASOPHILS RELATIVE PERCENT: 0.3 % (ref 0–1)
BILIRUB SERPL-MCNC: 0.6 MG/DL (ref 0–1)
BILIRUBIN URINE: NEGATIVE MG/DL
BLOOD, URINE: ABNORMAL
BUN BLDV-MCNC: 10 MG/DL (ref 6–23)
CALCIUM SERPL-MCNC: 9.5 MG/DL (ref 8.3–10.6)
CHLORIDE BLD-SCNC: 99 MMOL/L (ref 99–110)
CLARITY: CLEAR
CO2: 23 MMOL/L (ref 21–32)
COLOR: ABNORMAL
CREAT SERPL-MCNC: 1 MG/DL (ref 0.9–1.3)
DIFFERENTIAL TYPE: ABNORMAL
EOSINOPHILS ABSOLUTE: 0.2 K/CU MM
EOSINOPHILS RELATIVE PERCENT: 0.8 % (ref 0–3)
GFR AFRICAN AMERICAN: >60 ML/MIN/1.73M2
GFR NON-AFRICAN AMERICAN: >60 ML/MIN/1.73M2
GLUCOSE BLD-MCNC: 109 MG/DL (ref 70–99)
GLUCOSE, URINE: NEGATIVE MG/DL
HCT VFR BLD CALC: 40.8 % (ref 42–52)
HEMOGLOBIN: 13.2 GM/DL (ref 13.5–18)
HYALINE CASTS: 2 /LPF
IMMATURE NEUTROPHIL %: 0.7 % (ref 0–0.43)
INR BLD: 1.27 INDEX
KETONES, URINE: ABNORMAL MG/DL
LACTATE: 1.6 MMOL/L (ref 0.4–2)
LEUKOCYTE ESTERASE, URINE: NEGATIVE
LIPASE: 20 IU/L (ref 13–60)
LYMPHOCYTES ABSOLUTE: 2.1 K/CU MM
LYMPHOCYTES RELATIVE PERCENT: 11 % (ref 24–44)
MCH RBC QN AUTO: 30.4 PG (ref 27–31)
MCHC RBC AUTO-ENTMCNC: 32.4 % (ref 32–36)
MCV RBC AUTO: 94 FL (ref 78–100)
MONOCYTES ABSOLUTE: 1.2 K/CU MM
MONOCYTES RELATIVE PERCENT: 6.2 % (ref 0–4)
MUCUS: ABNORMAL HPF
NITRITE URINE, QUANTITATIVE: NEGATIVE
NUCLEATED RBC %: 0 %
PDW BLD-RTO: 12.2 % (ref 11.7–14.9)
PH, URINE: 5 (ref 5–8)
PLATELET # BLD: 506 K/CU MM (ref 140–440)
PMV BLD AUTO: 9.2 FL (ref 7.5–11.1)
POTASSIUM SERPL-SCNC: 4.6 MMOL/L (ref 3.5–5.1)
PROTEIN UA: 100 MG/DL
PROTHROMBIN TIME: 15.4 SECONDS (ref 11.7–14.5)
RBC # BLD: 4.34 M/CU MM (ref 4.6–6.2)
RBC URINE: 2 /HPF (ref 0–3)
SARS-COV-2, NAAT: NOT DETECTED
SEGMENTED NEUTROPHILS ABSOLUTE COUNT: 15.6 K/CU MM
SEGMENTED NEUTROPHILS RELATIVE PERCENT: 81 % (ref 36–66)
SODIUM BLD-SCNC: 135 MMOL/L (ref 135–145)
SOURCE: NORMAL
SPECIFIC GRAVITY UA: 1.02 (ref 1–1.03)
SQUAMOUS EPITHELIAL: <1 /HPF
TOTAL IMMATURE NEUTOROPHIL: 0.14 K/CU MM
TOTAL NUCLEATED RBC: 0 K/CU MM
TOTAL PROTEIN: 8.9 GM/DL (ref 6.4–8.2)
TRICHOMONAS: ABNORMAL /HPF
TROPONIN T: <0.01 NG/ML
UROBILINOGEN, URINE: 2 MG/DL (ref 0.2–1)
WBC # BLD: 19.3 K/CU MM (ref 4–10.5)
WBC UA: 1 /HPF (ref 0–2)

## 2021-06-12 PROCEDURE — 85730 THROMBOPLASTIN TIME PARTIAL: CPT

## 2021-06-12 PROCEDURE — 71275 CT ANGIOGRAPHY CHEST: CPT

## 2021-06-12 PROCEDURE — 81001 URINALYSIS AUTO W/SCOPE: CPT

## 2021-06-12 PROCEDURE — 85025 COMPLETE CBC W/AUTO DIFF WBC: CPT

## 2021-06-12 PROCEDURE — 80053 COMPREHEN METABOLIC PANEL: CPT

## 2021-06-12 PROCEDURE — 71046 X-RAY EXAM CHEST 2 VIEWS: CPT

## 2021-06-12 PROCEDURE — 83690 ASSAY OF LIPASE: CPT

## 2021-06-12 PROCEDURE — 93005 ELECTROCARDIOGRAM TRACING: CPT | Performed by: EMERGENCY MEDICINE

## 2021-06-12 PROCEDURE — 83605 ASSAY OF LACTIC ACID: CPT

## 2021-06-12 PROCEDURE — C9113 INJ PANTOPRAZOLE SODIUM, VIA: HCPCS | Performed by: PHYSICIAN ASSISTANT

## 2021-06-12 PROCEDURE — 96375 TX/PRO/DX INJ NEW DRUG ADDON: CPT

## 2021-06-12 PROCEDURE — 74177 CT ABD & PELVIS W/CONTRAST: CPT

## 2021-06-12 PROCEDURE — 6360000004 HC RX CONTRAST MEDICATION: Performed by: PHYSICIAN ASSISTANT

## 2021-06-12 PROCEDURE — 6370000000 HC RX 637 (ALT 250 FOR IP): Performed by: PHYSICIAN ASSISTANT

## 2021-06-12 PROCEDURE — 96367 TX/PROPH/DG ADDL SEQ IV INF: CPT

## 2021-06-12 PROCEDURE — 96366 THER/PROPH/DIAG IV INF ADDON: CPT

## 2021-06-12 PROCEDURE — 36415 COLL VENOUS BLD VENIPUNCTURE: CPT

## 2021-06-12 PROCEDURE — 85610 PROTHROMBIN TIME: CPT

## 2021-06-12 PROCEDURE — 6360000002 HC RX W HCPCS: Performed by: PHYSICIAN ASSISTANT

## 2021-06-12 PROCEDURE — 2580000003 HC RX 258: Performed by: PHYSICIAN ASSISTANT

## 2021-06-12 PROCEDURE — 87635 SARS-COV-2 COVID-19 AMP PRB: CPT

## 2021-06-12 PROCEDURE — 87040 BLOOD CULTURE FOR BACTERIA: CPT

## 2021-06-12 PROCEDURE — 87086 URINE CULTURE/COLONY COUNT: CPT

## 2021-06-12 PROCEDURE — 73630 X-RAY EXAM OF FOOT: CPT

## 2021-06-12 PROCEDURE — 84484 ASSAY OF TROPONIN QUANT: CPT

## 2021-06-12 PROCEDURE — 96365 THER/PROPH/DIAG IV INF INIT: CPT

## 2021-06-12 PROCEDURE — 99291 CRITICAL CARE FIRST HOUR: CPT

## 2021-06-12 RX ORDER — 0.9 % SODIUM CHLORIDE 0.9 %
1000 INTRAVENOUS SOLUTION INTRAVENOUS ONCE
Status: COMPLETED | OUTPATIENT
Start: 2021-06-12 | End: 2021-06-12

## 2021-06-12 RX ORDER — 0.9 % SODIUM CHLORIDE 0.9 %
1500 INTRAVENOUS SOLUTION INTRAVENOUS ONCE
Status: COMPLETED | OUTPATIENT
Start: 2021-06-12 | End: 2021-06-12

## 2021-06-12 RX ORDER — PANTOPRAZOLE SODIUM 40 MG/10ML
40 INJECTION, POWDER, LYOPHILIZED, FOR SOLUTION INTRAVENOUS ONCE
Status: COMPLETED | OUTPATIENT
Start: 2021-06-12 | End: 2021-06-12

## 2021-06-12 RX ORDER — HYDROCODONE BITARTRATE AND ACETAMINOPHEN 5; 325 MG/1; MG/1
2 TABLET ORAL ONCE
Status: COMPLETED | OUTPATIENT
Start: 2021-06-12 | End: 2021-06-12

## 2021-06-12 RX ORDER — ONDANSETRON 2 MG/ML
4 INJECTION INTRAMUSCULAR; INTRAVENOUS ONCE
Status: COMPLETED | OUTPATIENT
Start: 2021-06-12 | End: 2021-06-12

## 2021-06-12 RX ADMIN — PANTOPRAZOLE SODIUM 40 MG: 40 INJECTION, POWDER, FOR SOLUTION INTRAVENOUS at 17:58

## 2021-06-12 RX ADMIN — HYDROCODONE BITARTRATE AND ACETAMINOPHEN 2 TABLET: 5; 325 TABLET ORAL at 17:03

## 2021-06-12 RX ADMIN — VANCOMYCIN HYDROCHLORIDE 1500 MG: 5 INJECTION, POWDER, LYOPHILIZED, FOR SOLUTION INTRAVENOUS at 18:38

## 2021-06-12 RX ADMIN — SODIUM CHLORIDE 1500 ML: 9 INJECTION, SOLUTION INTRAVENOUS at 19:57

## 2021-06-12 RX ADMIN — ONDANSETRON 4 MG: 2 INJECTION INTRAMUSCULAR; INTRAVENOUS at 17:06

## 2021-06-12 RX ADMIN — IOPAMIDOL 80 ML: 755 INJECTION, SOLUTION INTRAVENOUS at 17:44

## 2021-06-12 RX ADMIN — CEFEPIME HYDROCHLORIDE 2000 MG: 2 INJECTION, POWDER, FOR SOLUTION INTRAVENOUS at 17:59

## 2021-06-12 RX ADMIN — SODIUM CHLORIDE 1000 ML: 9 INJECTION, SOLUTION INTRAVENOUS at 17:05

## 2021-06-12 ASSESSMENT — PAIN DESCRIPTION - LOCATION
LOCATION: GENERALIZED
LOCATION: ABDOMEN

## 2021-06-12 ASSESSMENT — PAIN SCALES - GENERAL
PAINLEVEL_OUTOF10: 6
PAINLEVEL_OUTOF10: 3
PAINLEVEL_OUTOF10: 10

## 2021-06-12 ASSESSMENT — PAIN DESCRIPTION - PAIN TYPE
TYPE: ACUTE PAIN
TYPE: ACUTE PAIN

## 2021-06-12 ASSESSMENT — PAIN DESCRIPTION - PROGRESSION: CLINICAL_PROGRESSION: RAPIDLY IMPROVING

## 2021-06-12 ASSESSMENT — PAIN DESCRIPTION - FREQUENCY: FREQUENCY: CONTINUOUS

## 2021-06-12 NOTE — ED PROVIDER NOTES
muscle tenderness. No CVA tenderness    Extremities:  No gross deformities, no edema, no tenderness.   Ulceration left foot    Neurologic:  Normal motor function, Normal sensory function, No focal deficits    Skin:  Warm, Dry, No erythema, No rash, No cyanosis, No mottling    Lymphatic:  No lymphadenopathy in the following location(s): cervical    Psychiatric:  Alert and oriented x3, Affect labile          RADIOLOGY/PROCEDURES/LABS/MEDICATIONS ADMINISTERED:    I have reviewed and interpreted all of the currently available lab results from this visit (if applicable):  Results for orders placed or performed during the hospital encounter of 06/12/21   COVID-19, Rapid    Specimen: Nasopharyngeal   Result Value Ref Range    Source THROAT     SARS-CoV-2, NAAT NOT DETECTED NOT DETECTED   CBC Auto Differential   Result Value Ref Range    WBC 19.3 (H) 4.0 - 10.5 K/CU MM    RBC 4.34 (L) 4.6 - 6.2 M/CU MM    Hemoglobin 13.2 (L) 13.5 - 18.0 GM/DL    Hematocrit 40.8 (L) 42 - 52 %    MCV 94.0 78 - 100 FL    MCH 30.4 27 - 31 PG    MCHC 32.4 32.0 - 36.0 %    RDW 12.2 11.7 - 14.9 %    Platelets 006 (H) 004 - 440 K/CU MM    MPV 9.2 7.5 - 11.1 FL    Differential Type AUTOMATED DIFFERENTIAL     Segs Relative 81.0 (H) 36 - 66 %    Lymphocytes % 11.0 (L) 24 - 44 %    Monocytes % 6.2 (H) 0 - 4 %    Eosinophils % 0.8 0 - 3 %    Basophils % 0.3 0 - 1 %    Segs Absolute 15.6 K/CU MM    Lymphocytes Absolute 2.1 K/CU MM    Monocytes Absolute 1.2 K/CU MM    Eosinophils Absolute 0.2 K/CU MM    Basophils Absolute 0.1 K/CU MM    Nucleated RBC % 0.0 %    Total Nucleated RBC 0.0 K/CU MM    Total Immature Neutrophil 0.14 K/CU MM    Immature Neutrophil % 0.7 (H) 0 - 0.43 %   Comprehensive Metabolic Panel   Result Value Ref Range    Sodium 135 135 - 145 MMOL/L    Potassium 4.6 3.5 - 5.1 MMOL/L    Chloride 99 99 - 110 mMol/L    CO2 23 21 - 32 MMOL/L    BUN 10 6 - 23 MG/DL    CREATININE 1.0 0.9 - 1.3 MG/DL    Glucose 109 (H) 70 - 99 MG/DL    Calcium 9.5 Value    WBC 19.3 (*)     RBC 4.34 (*)     Hemoglobin 13.2 (*)     Hematocrit 40.8 (*)     Platelets 329 (*)     Segs Relative 81.0 (*)     Lymphocytes % 11.0 (*)     Monocytes % 6.2 (*)     Immature Neutrophil % 0.7 (*)     All other components within normal limits   COMPREHENSIVE METABOLIC PANEL - Abnormal; Notable for the following components:    Glucose 109 (*)     Total Protein 8.9 (*)     ALT 8 (*)     AST 12 (*)     All other components within normal limits   URINALYSIS - Abnormal; Notable for the following components:    Color, UA JUAN (*)     Ketones, Urine SMALL (*)     Blood, Urine SMALL (*)     Protein,  (*)     Urobilinogen, Urine 2.0 (*)     Bacteria, UA FEW (*)     Mucus, UA RARE (*)     All other components within normal limits   PROTIME/INR & PTT - Abnormal; Notable for the following components:    Protime 15.4 (*)     aPTT 22.9 (*)     All other components within normal limits   COVID-19, RAPID   CULTURE, BLOOD 1   CULTURE, BLOOD 2   CULTURE, URINE   LIPASE   TROPONIN   LACTIC ACID, PLASMA         IMAGING STUDIES ORDERED:  XR CHEST (2 VW)  XR FOOT LEFT (MIN 3 VIEWS)  CTA PULMONARY W CONTRAST  CT ABDOMEN PELVIS W IV CONTRAST  SALINE LOCK IV  IP CONSULT TO GENERAL SURGERY  IP CONSULT TO INTERNAL MEDICINE    I have personally viewed the imaging studies. The radiologist interpretation is:   CT ABDOMEN PELVIS W IV CONTRAST Additional Contrast? None   Final Result   1. No evidence of pulmonary embolism   2. No acute pulmonary abnormality. Interval resolution of airspace opacities   and nodules present on the comparison exam   3. No acute abdominopelvic process   4. Left nephrolithiasis         CTA PULMONARY W CONTRAST   Final Result   1. No evidence of pulmonary embolism   2. No acute pulmonary abnormality. Interval resolution of airspace opacities   and nodules present on the comparison exam   3. No acute abdominopelvic process   4.  Left nephrolithiasis         XR FOOT LEFT (MIN 3 VIEWS) Final Result   Findings of osteomyelitis similar to radiographs 4 days ago. XR CHEST (2 VW)   Final Result   No cardiomegaly, pneumonia or interstitial edema. No significant change from 03/12/2021. MEDICATIONS ADMINISTERED:  Medications   0.9 % sodium chloride bolus (0 mLs Intravenous Stopped 6/12/21 1838)   HYDROcodone-acetaminophen (NORCO) 5-325 MG per tablet 2 tablet (2 tablets Oral Given 6/12/21 1703)   ondansetron (ZOFRAN) injection 4 mg (4 mg Intravenous Given 6/12/21 1706)   pantoprazole (PROTONIX) injection 40 mg (40 mg Intravenous Given 6/12/21 1758)   cefepime (MAXIPIME) 2000 mg IVPB minibag (0 mg Intravenous Stopped 6/12/21 1838)   vancomycin (VANCOCIN) 1,500 mg in dextrose 5 % 500 mL IVPB (0 mg Intravenous Stopped 6/12/21 2041)   iopamidol (ISOVUE-370) 76 % injection 80 mL (80 mLs Intravenous Given 6/12/21 1744)   0.9 % sodium chloride bolus (0 mLs Intravenous Stopped 6/12/21 2102)         PLAN/ED COURSE:  Last Vitals: BP (!) 161/98   Pulse 94   Temp 98.2 °F (36.8 °C) (Oral)   Resp 24   Ht 6' (1.829 m)   Wt 170 lb (77.1 kg)   SpO2 99%   BMI 23.06 kg/m²       77-year-old male presents with sepsis, likely secondary to the osteomyelitis of the left foot. He does have a significant diabetic foot ulcer, he will require transfer as this area will require debridement and current ORs unavailable. Covered with broad-spectrum antibiotics. Clinical Impression:  1. Septicemia ([address]    2. Osteomyelitis of left foot, unspecified type (HCC)    3. Chest pain, unspecified type    4. Cough    5. Abdominal pain, unspecified abdominal location        Disposition referral (if applicable):  No follow-up provider specified.     Disposition medications (if applicable):  Discharge Medication List as of 6/13/2021 12:01 AM          ED Provider Disposition Time  DISPOSITION Decision To Transfer 06/12/2021 07:26:01 PM            Electronically signed by Comfort Soto MD on 6/13/2021 at 9:10 AM        Geronimo Quigley MD  06/13/21 9173

## 2021-06-12 NOTE — ED PROVIDER NOTES
This EKG was interpreted by me. Rate is 101, rhythm is sinus. FL and QT intervals are within normal limits. There is no ST segment or T wave changes. This EKG was compared to previous EKG from date 3/12/21. No significant change from previous EKG.      Bridgette Hernandez DO  06/12/21 8410

## 2021-06-12 NOTE — ED PROVIDER NOTES
EMERGENCY DEPARTMENT ENCOUNTER      PCP: Megan Marsh MD    CHIEF COMPLAINT    Chief Complaint   Patient presents with    Cough     vomiting, headache, wound to left foot       Patient staffed with supervising physician Dr. Demetria Garcia is a 36 y.o. male who presents with multiple complaints. Patient states he has diabetic foot wound to left foot which is worsened over the past couple weeks. Patient states he saw wound care and was started on oral antibiotics, states there were signs of possible infection moving of the bone. Patient follows with general surgeon Dr. Laura Levine. Patient states over the past 3 to 4 days he has had cough and sore throat. Patient had negative Covid swab a couple days ago. Patient has associated chest pain, shortness of breath. Patient states he has upper abdominal pain with associated nausea and vomiting. Patient denies any obvious fever at home, was febrile here. Patient denies diarrhea. Patient states he has noticed some blood in vomit. Patient denies taking blood thinners. REVIEW OF SYSTEMS    Constitutional: See HPI  HENT: See HPI  Cardiovascular: see HPI  Respiratory: See HPI  GI: See HPI  :  Denies any urinary symptoms.    Musculoskeletal: See HPI  Skin: See HPI  Neurologic:  Denies focal weakness or sensory changes   Lymphatic:  Denies swollen glands     All other review of systems are negative  See HPI and nursing notes for additional information     PAST MEDICAL AND SURGICAL HISTORY    Past Medical History:   Diagnosis Date    Callus of foot     Right foot - see's Dr. Swathi Marques Diabetic ulcer of left midfoot associated with type 2 diabetes mellitus, with necrosis of muscle ([address] 6/7/2021    Diabetic ulcer of right midfoot associated with type 2 diabetes mellitus, with fat layer exposed ([address] 8/11/2020    Dizziness     positional    Essential hypertension     Follows with PCP    Hyperlipidemia     Lumbar radiculopathy     Septic omeprazole (PRILOSEC) 20 MG delayed release capsule TAKE 1 CAPSULE BY MOUTH ONCE DAILY IN THE MORNING 90 capsule 1    pioglitazone (ACTOS) 15 MG tablet Take 1 tablet by mouth daily (Patient taking differently: Take 15 mg by mouth daily Pt states PRN) 30 tablet 5    blood glucose monitor kit and supplies Dispense sufficient amount for indicated testing frequency plus additional to accommodate PRN testing needs. Dispense all needed supplies to include: monitor, strips, lancing device, lancets, control solutions, alcohol swabs. 1 kit 0    blood glucose monitor strips Test 2 times a day & as needed for symptoms of irregular blood glucose. Dispense sufficient amount for indicated testing frequency plus additional to accommodate PRN testing needs.  100 strip 0    FreeStyle Lancets MISC 1 each by Does not apply route daily 100 each 3    aspirin 81 MG tablet Take 81 mg by mouth daily      acetaminophen (TYLENOL) 325 MG tablet Take 2 tablets by mouth every 4 hours as needed for Pain or Fever 120 tablet 3       ALLERGIES    No Known Allergies    SOCIAL AND FAMILY HISTORY    Social History     Socioeconomic History    Marital status: Single     Spouse name: Not on file    Number of children: Not on file    Years of education: Not on file    Highest education level: Not on file   Occupational History    Not on file   Tobacco Use    Smoking status: Never Smoker    Smokeless tobacco: Never Used   Vaping Use    Vaping Use: Never used   Substance and Sexual Activity    Alcohol use: Yes     Comment: \"couple beers a night\"    Drug use: No    Sexual activity: Yes     Partners: Female   Other Topics Concern    Not on file   Social History Narrative    Not on file     Social Determinants of Health     Financial Resource Strain:     Difficulty of Paying Living Expenses:    Food Insecurity:     Worried About Running Out of Food in the Last Year:     Margarita of Food in the Last Year:    Transportation Needs:     Lack of Transportation (Medical):  Lack of Transportation (Non-Medical):    Physical Activity:     Days of Exercise per Week:     Minutes of Exercise per Session:    Stress:     Feeling of Stress :    Social Connections:     Frequency of Communication with Friends and Family:     Frequency of Social Gatherings with Friends and Family:     Attends Yarsani Services:     Active Member of Clubs or Organizations:     Attends Club or Organization Meetings:     Marital Status:    Intimate Partner Violence:     Fear of Current or Ex-Partner:     Emotionally Abused:     Physically Abused:     Sexually Abused:      Family History   Problem Relation Age of Onset    Heart Disease Father     Diabetes Father     Diabetes Mother     Heart Disease Mother     Other Mother     Kidney Disease Mother     Heart Attack Mother          PHYSICAL EXAM    VITAL SIGNS: BP (!) 154/97   Pulse 98   Temp 100.5 °F (38.1 °C) (Oral)   Resp 27   Ht 6' (1.829 m)   Wt 170 lb (77.1 kg)   SpO2 100%   BMI 23.06 kg/m²    Constitutional:  Well developed, Well nourished  HENT:  Normocephalic, Atraumatic, PERRL. Oropharynx is clear  Neck/Lymphatics: supple, no JVD, no swollen nodes  Cardiovascular: Tachycardic, normal rhythm  Respiratory:  Nonlabored breathing. Normal breath sounds  Abdomen: Bowel sounds normal, Soft, mild upper tenderness, no masses. Musculoskeletal: Left foot with moderate swelling to third toe and mild swelling to left fourth toe. There is open wound to plantar aspect of third toe and large ulcer of plantar aspect of forefoot. No active drainage. Capillary refill intact. Integument:  Left foot with moderate swelling to third toe and mild swelling to left fourth toe. There is open wound to plantar aspect of third toe and large ulcer of plantar aspect of forefoot. No active drainage. Capillary refill intact. Neurologic:  Alert & oriented , No focal deficits noted.    Psychiatric:  Affect normal, Mood normal.       Labs:  Results for orders placed or performed during the hospital encounter of 06/12/21   COVID-19, Rapid    Specimen: Nasopharyngeal   Result Value Ref Range    Source THROAT     SARS-CoV-2, NAAT NOT DETECTED NOT DETECTED   CBC Auto Differential   Result Value Ref Range    WBC 19.3 (H) 4.0 - 10.5 K/CU MM    RBC 4.34 (L) 4.6 - 6.2 M/CU MM    Hemoglobin 13.2 (L) 13.5 - 18.0 GM/DL    Hematocrit 40.8 (L) 42 - 52 %    MCV 94.0 78 - 100 FL    MCH 30.4 27 - 31 PG    MCHC 32.4 32.0 - 36.0 %    RDW 12.2 11.7 - 14.9 %    Platelets 291 (H) 600 - 440 K/CU MM    MPV 9.2 7.5 - 11.1 FL    Differential Type AUTOMATED DIFFERENTIAL     Segs Relative 81.0 (H) 36 - 66 %    Lymphocytes % 11.0 (L) 24 - 44 %    Monocytes % 6.2 (H) 0 - 4 %    Eosinophils % 0.8 0 - 3 %    Basophils % 0.3 0 - 1 %    Segs Absolute 15.6 K/CU MM    Lymphocytes Absolute 2.1 K/CU MM    Monocytes Absolute 1.2 K/CU MM    Eosinophils Absolute 0.2 K/CU MM    Basophils Absolute 0.1 K/CU MM    Nucleated RBC % 0.0 %    Total Nucleated RBC 0.0 K/CU MM    Total Immature Neutrophil 0.14 K/CU MM    Immature Neutrophil % 0.7 (H) 0 - 0.43 %   Comprehensive Metabolic Panel   Result Value Ref Range    Sodium 135 135 - 145 MMOL/L    Potassium 4.6 3.5 - 5.1 MMOL/L    Chloride 99 99 - 110 mMol/L    CO2 23 21 - 32 MMOL/L    BUN 10 6 - 23 MG/DL    CREATININE 1.0 0.9 - 1.3 MG/DL    Glucose 109 (H) 70 - 99 MG/DL    Calcium 9.5 8.3 - 10.6 MG/DL    Albumin 3.7 3.4 - 5.0 GM/DL    Total Protein 8.9 (H) 6.4 - 8.2 GM/DL    Total Bilirubin 0.6 0.0 - 1.0 MG/DL    ALT 8 (L) 10 - 40 U/L    AST 12 (L) 15 - 37 IU/L    Alkaline Phosphatase 86 40 - 129 IU/L    GFR Non-African American >60 >60 mL/min/1.73m2    GFR African American >60 >60 mL/min/1.73m2    Anion Gap 13 4 - 16   Lipase   Result Value Ref Range    Lipase 20 13 - 60 IU/L   Urinalysis   Result Value Ref Range    Color, UA JUAN (A) YELLOW    Clarity, UA CLEAR CLEAR    Glucose, Urine NEGATIVE NEGATIVE MG/DL    Bilirubin interpretation of the lab work. This time excludes time spent performing procedures and separately billable procedures and family discussion time. Clinical  IMPRESSION    1. Septicemia ([address]    2. Osteomyelitis of left foot, unspecified type (HCC)    3. Chest pain, unspecified type    4. Cough    5. Abdominal pain, unspecified abdominal location        Patient transferred to [facility]: Please note this report has been produced using speech recognition software and may contain errors related to that system including errors in grammar, punctuation, and spelling, as well as words and phrases that may be inappropriate. If there are any questions or concerns please feel free to contact the dictating provider for clarification.             Carol Montes PA-C  06/12/21 1942

## 2021-06-13 LAB
CULTURE: ABNORMAL
CULTURE: ABNORMAL
CULTURE: NORMAL
EKG ATRIAL RATE: 101 BPM
EKG DIAGNOSIS: NORMAL
EKG P AXIS: 62 DEGREES
EKG P-R INTERVAL: 136 MS
EKG Q-T INTERVAL: 340 MS
EKG QRS DURATION: 86 MS
EKG QTC CALCULATION (BAZETT): 440 MS
EKG R AXIS: 61 DEGREES
EKG T AXIS: 49 DEGREES
EKG VENTRICULAR RATE: 101 BPM
Lab: ABNORMAL
Lab: NORMAL
SPECIMEN: ABNORMAL
SPECIMEN: NORMAL

## 2021-06-13 PROCEDURE — 93010 ELECTROCARDIOGRAM REPORT: CPT | Performed by: INTERNAL MEDICINE

## 2021-06-13 NOTE — ED NOTES
Report given to EVANGELINA [facility] and care transferred at this time     Amisha Granados RN  06/12/21 2028

## 2021-06-17 LAB
CULTURE: NORMAL
CULTURE: NORMAL
Lab: NORMAL
Lab: NORMAL
SPECIMEN: NORMAL
SPECIMEN: NORMAL

## 2021-06-21 ENCOUNTER — HOSPITAL ENCOUNTER (OUTPATIENT)
Age: 41
Setting detail: SPECIMEN
Discharge: HOME | End: 2021-06-21
Payer: COMMERCIAL

## 2021-06-21 LAB
ANION GAP SERPL CALCULATED.3IONS-SCNC: 15 MMOL/L (ref 4–16)
BASOPHILS ABSOLUTE: 0.1 K/CU MM
BASOPHILS RELATIVE PERCENT: 0.6 % (ref 0–1)
BUN BLDV-MCNC: 13 MG/DL (ref 6–23)
CALCIUM SERPL-MCNC: 8.9 MG/DL (ref 8.3–10.6)
CHLORIDE BLD-SCNC: 102 MMOL/L (ref 99–110)
CO2: 22 MMOL/L (ref 21–32)
CREAT SERPL-MCNC: 0.8 MG/DL (ref 0.9–1.3)
DIFFERENTIAL TYPE: ABNORMAL
DOSE AMOUNT: NORMAL
DOSE TIME: NORMAL
EOSINOPHILS ABSOLUTE: 0.1 K/CU MM
EOSINOPHILS RELATIVE PERCENT: 1.3 % (ref 0–3)
ERYTHROCYTE SEDIMENTATION RATE: 70 MM/HR (ref 0–15)
GFR AFRICAN AMERICAN: >60 ML/MIN/1.73M2
GFR NON-AFRICAN AMERICAN: >60 ML/MIN/1.73M2
GLUCOSE BLD-MCNC: 141 MG/DL (ref 70–99)
HCT VFR BLD CALC: 38.9 % (ref 42–52)
HEMOGLOBIN: 12.5 GM/DL (ref 13.5–18)
IMMATURE NEUTROPHIL %: 0.4 % (ref 0–0.43)
LYMPHOCYTES ABSOLUTE: 2 K/CU MM
LYMPHOCYTES RELATIVE PERCENT: 22.1 % (ref 24–44)
MCH RBC QN AUTO: 30.5 PG (ref 27–31)
MCHC RBC AUTO-ENTMCNC: 32.1 % (ref 32–36)
MCV RBC AUTO: 94.9 FL (ref 78–100)
MONOCYTES ABSOLUTE: 0.6 K/CU MM
MONOCYTES RELATIVE PERCENT: 6.7 % (ref 0–4)
NUCLEATED RBC %: 0 %
PDW BLD-RTO: 12.6 % (ref 11.7–14.9)
PLATELET # BLD: 533 K/CU MM (ref 140–440)
PMV BLD AUTO: 8.9 FL (ref 7.5–11.1)
POTASSIUM SERPL-SCNC: 4.4 MMOL/L (ref 3.5–5.1)
RBC # BLD: 4.1 M/CU MM (ref 4.6–6.2)
SEGMENTED NEUTROPHILS ABSOLUTE COUNT: 6.2 K/CU MM
SEGMENTED NEUTROPHILS RELATIVE PERCENT: 68.9 % (ref 36–66)
SODIUM BLD-SCNC: 139 MMOL/L (ref 135–145)
TOTAL IMMATURE NEUTOROPHIL: 0.04 K/CU MM
TOTAL NUCLEATED RBC: 0 K/CU MM
VANCOMYCIN TROUGH: 17.1 UG/ML (ref 10–20)
WBC # BLD: 9 K/CU MM (ref 4–10.5)

## 2021-06-21 PROCEDURE — 85025 COMPLETE CBC W/AUTO DIFF WBC: CPT

## 2021-06-21 PROCEDURE — 80202 ASSAY OF VANCOMYCIN: CPT

## 2021-06-21 PROCEDURE — 80048 BASIC METABOLIC PNL TOTAL CA: CPT

## 2021-06-21 PROCEDURE — 85652 RBC SED RATE AUTOMATED: CPT

## 2021-06-22 ENCOUNTER — HOSPITAL ENCOUNTER (OUTPATIENT)
Dept: WOUND CARE | Age: 41
Discharge: HOME | End: 2021-06-22
Payer: COMMERCIAL

## 2021-06-22 VITALS
RESPIRATION RATE: 18 BRPM | SYSTOLIC BLOOD PRESSURE: 165 MMHG | TEMPERATURE: 98 F | DIASTOLIC BLOOD PRESSURE: 97 MMHG | HEART RATE: 74 BPM

## 2021-06-22 DIAGNOSIS — E11.621 DIABETIC ULCER OF LEFT MIDFOOT ASSOCIATED WITH TYPE 2 DIABETES MELLITUS, WITH NECROSIS OF MUSCLE (HCC): Primary | ICD-10-CM

## 2021-06-22 DIAGNOSIS — S98.132A AMPUTATION OF TOE OF LEFT FOOT (HCC): ICD-10-CM

## 2021-06-22 DIAGNOSIS — L97.423 DIABETIC ULCER OF LEFT MIDFOOT ASSOCIATED WITH TYPE 2 DIABETES MELLITUS, WITH NECROSIS OF MUSCLE (HCC): Primary | ICD-10-CM

## 2021-06-22 PROBLEM — L97.412 DIABETIC ULCER OF RIGHT MIDFOOT ASSOCIATED WITH TYPE 2 DIABETES MELLITUS, WITH FAT LAYER EXPOSED (HCC): Status: RESOLVED | Noted: 2020-08-11 | Resolved: 2021-06-22

## 2021-06-22 PROBLEM — L97.414 DIABETIC ULCER OF RIGHT MIDFOOT ASSOCIATED WITH TYPE 2 DIABETES MELLITUS, WITH NECROSIS OF BONE (HCC): Status: RESOLVED | Noted: 2021-01-19 | Resolved: 2021-06-22

## 2021-06-22 PROCEDURE — 97597 DBRDMT OPN WND 1ST 20 CM/<: CPT | Performed by: NURSE PRACTITIONER

## 2021-06-22 PROCEDURE — 97597 DBRDMT OPN WND 1ST 20 CM/<: CPT

## 2021-06-22 RX ORDER — BACITRACIN ZINC AND POLYMYXIN B SULFATE 500; 1000 [USP'U]/G; [USP'U]/G
OINTMENT TOPICAL ONCE
Status: CANCELLED | OUTPATIENT
Start: 2021-06-22 | End: 2021-06-22

## 2021-06-22 RX ORDER — LIDOCAINE 40 MG/G
CREAM TOPICAL ONCE
Status: CANCELLED | OUTPATIENT
Start: 2021-06-22 | End: 2021-06-22

## 2021-06-22 RX ORDER — BETAMETHASONE DIPROPIONATE 0.05 %
OINTMENT (GRAM) TOPICAL ONCE
Status: CANCELLED | OUTPATIENT
Start: 2021-06-22 | End: 2021-06-22

## 2021-06-22 RX ORDER — GINSENG 100 MG
CAPSULE ORAL ONCE
Status: CANCELLED | OUTPATIENT
Start: 2021-06-22 | End: 2021-06-22

## 2021-06-22 RX ORDER — CLOBETASOL PROPIONATE 0.5 MG/G
OINTMENT TOPICAL ONCE
Status: CANCELLED | OUTPATIENT
Start: 2021-06-22 | End: 2021-06-22

## 2021-06-22 RX ORDER — GENTAMICIN SULFATE 1 MG/G
OINTMENT TOPICAL ONCE
Status: CANCELLED | OUTPATIENT
Start: 2021-06-22 | End: 2021-06-22

## 2021-06-22 RX ORDER — LIDOCAINE HYDROCHLORIDE 20 MG/ML
JELLY TOPICAL ONCE
Status: CANCELLED | OUTPATIENT
Start: 2021-06-22 | End: 2021-06-22

## 2021-06-22 RX ORDER — LIDOCAINE 50 MG/G
OINTMENT TOPICAL ONCE
Status: CANCELLED | OUTPATIENT
Start: 2021-06-22 | End: 2021-06-22

## 2021-06-22 RX ORDER — LIDOCAINE HYDROCHLORIDE 40 MG/ML
SOLUTION TOPICAL ONCE
Status: CANCELLED | OUTPATIENT
Start: 2021-06-22 | End: 2021-06-22

## 2021-06-22 RX ORDER — BACITRACIN, NEOMYCIN, POLYMYXIN B 400; 3.5; 5 [USP'U]/G; MG/G; [USP'U]/G
OINTMENT TOPICAL ONCE
Status: CANCELLED | OUTPATIENT
Start: 2021-06-22 | End: 2021-06-22

## 2021-06-22 ASSESSMENT — PAIN - FUNCTIONAL ASSESSMENT: PAIN_FUNCTIONAL_ASSESSMENT: PREVENTS OR INTERFERES SOME ACTIVE ACTIVITIES AND ADLS

## 2021-06-22 ASSESSMENT — PAIN DESCRIPTION - PROGRESSION: CLINICAL_PROGRESSION: GRADUALLY IMPROVING

## 2021-06-22 ASSESSMENT — PAIN DESCRIPTION - ORIENTATION: ORIENTATION: RIGHT

## 2021-06-22 ASSESSMENT — PAIN SCALES - GENERAL: PAINLEVEL_OUTOF10: 3

## 2021-06-22 ASSESSMENT — PAIN DESCRIPTION - FREQUENCY: FREQUENCY: CONTINUOUS

## 2021-06-22 ASSESSMENT — PAIN DESCRIPTION - ONSET: ONSET: ON-GOING

## 2021-06-22 ASSESSMENT — PAIN DESCRIPTION - DESCRIPTORS: DESCRIPTORS: SORE

## 2021-06-22 ASSESSMENT — PAIN DESCRIPTION - LOCATION: LOCATION: ABDOMEN

## 2021-06-22 ASSESSMENT — PAIN DESCRIPTION - PAIN TYPE: TYPE: ACUTE PAIN

## 2021-06-22 NOTE — PROGRESS NOTES
Wound Care Center Progress Note With Procedure    Ayanna Grey  AGE: 36 y.o. GENDER: male  : 1980  EPISODE DATE:  2021     Subjective:     Chief Complaint   Patient presents with    Wound Check         HISTORY of PRESENT ILLNESS     Ayanna Grey is a 36 y.o. male who presents today for wound evaluation of Chronic diabetic and pressure ulcer(s) of left third toe and medial aspect of the left plantar surface. The ulcer is of moderate severity. The underlying cause of the wound is diabetes and pressure. The patient had osteomyelitis left foot, he was admitted to [facility] -21, he had L foot OM of 3rd/4th metatarsal heads, 2-4 phalanges s/p partial left 3rd ray amputation on 6/15/21. He was sent home with a PIC and IV antibiotics Cefepime and Vancomycin through 21. He is to be non-weight bearing.     Wound Pain Timing/Severity: intermittent, mild  Quality of pain: tender  Severity of pain:  2 / 10   Modifying Factors: diabetes, poor glucose control, chronic pressure and non-adherence  Associated Signs/Symptoms: drainage and pain        PAST MEDICAL HISTORY        Diagnosis Date    Callus of foot     Right foot - see's Dr. Mireya Mcgill Diabetic ulcer of left midfoot associated with type 2 diabetes mellitus, with necrosis of muscle ([address] 2021    Diabetic ulcer of right midfoot associated with type 2 diabetes mellitus, with fat layer exposed ([address] 2020    Dizziness     positional    Essential hypertension     Follows with PCP    Hyperlipidemia     Lumbar radiculopathy     Septic embolism ([address] 2020       PAST SURGICAL HISTORY    Past Surgical History:   Procedure Laterality Date    ACHILLES TENDON SURGERY Right 2021    RIGHT ACHILLES TENDON LENGTHENING REPAIR performed by Christine Duque DPM at [facility]  2018    [place]    DENTAL SURGERY      teeth extractions -half per patient    HERNIA REPAIR Bilateral 2020    BIALTERAL HERNIA INGUINAL REPAIR performed by Miguel Ángel Tom MD at [address] [place] [address]  2018    RI OFFICE/OUTPT VISIT,PROCEDURE ONLY Left 4/17/2018    L5-S1 HEMILAMINECTOMY, REMOVAL OF DISC LEFT SIDE performed by Per Mcgee MD at [place] [address] Right 1/8/2021    RIGHT TRANSMETATARSAL TOE AMPUTATION performed by Kirby Wheat DPM at [address] EXTRACTION         FAMILY HISTORY    Family History   Problem Relation Age of Onset    Heart Disease Father     Diabetes Father     Diabetes Mother     Heart Disease Mother     Other Mother     Kidney Disease Mother     Heart Attack Mother        SOCIAL HISTORY    Social History     Tobacco Use    Smoking status: Never Smoker    Smokeless tobacco: Never Used   Vaping Use    Vaping Use: Never used   Substance Use Topics    Alcohol use: Yes     Comment: \"couple beers a night\"    Drug use: No       ALLERGIES    No Known Allergies    MEDICATIONS    Current Outpatient Medications on File Prior to Encounter   Medication Sig Dispense Refill    guaiFENesin (MUCINEX) 600 MG extended release tablet Take 1 tablet by mouth 2 times daily 20 tablet 0    benzonatate (TESSALON PERLES) 100 MG capsule Take 1 capsule by mouth 3 times daily as needed for Cough 20 capsule 0    ondansetron (ZOFRAN-ODT) 4 MG disintegrating tablet Take 1 tablet by mouth every 8 hours as needed for Nausea or Vomiting 15 tablet 0    metFORMIN (GLUCOPHAGE) 500 MG tablet Take 2 tablets by mouth 2 times daily (with meals) Indications: Start tomorrow 03/14 360 tablet 1    FLUoxetine (PROZAC) 40 MG capsule Take 1 capsule by mouth daily 30 capsule 5    lisinopril (PRINIVIL;ZESTRIL) 10 MG tablet Take 1 tablet by mouth daily 30 tablet 5    metoprolol succinate (TOPROL XL) 25 MG extended release tablet Take 1 tablet by mouth daily 30 tablet 5    atorvastatin (LIPITOR) 40 MG tablet Take 1 tablet by mouth nightly 30 tablet 3    glyBURIDE (DIABETA) 5 MG tablet Take 2 tablets by mouth 2 times daily (with meals) 360 tablet 1    omeprazole (PRILOSEC) 20 MG delayed release capsule TAKE 1 CAPSULE BY MOUTH ONCE DAILY IN THE MORNING 90 capsule 1    pioglitazone (ACTOS) 15 MG tablet Take 1 tablet by mouth daily (Patient taking differently: Take 15 mg by mouth daily Pt states PRN) 30 tablet 5    blood glucose monitor kit and supplies Dispense sufficient amount for indicated testing frequency plus additional to accommodate PRN testing needs. Dispense all needed supplies to include: monitor, strips, lancing device, lancets, control solutions, alcohol swabs. 1 kit 0    blood glucose monitor strips Test 2 times a day & as needed for symptoms of irregular blood glucose. Dispense sufficient amount for indicated testing frequency plus additional to accommodate PRN testing needs. 100 strip 0    FreeStyle Lancets MISC 1 each by Does not apply route daily 100 each 3    aspirin 81 MG tablet Take 81 mg by mouth daily      acetaminophen (TYLENOL) 325 MG tablet Take 2 tablets by mouth every 4 hours as needed for Pain or Fever 120 tablet 3     No current facility-administered medications on file prior to encounter. REVIEW OF SYSTEMS    Pertinent items are noted in HPI. Constitutional: Negative for systemic symptoms including fever, chills and malaise. Objective:      BP (!) 165/97   Pulse 74   Temp 98 °F (36.7 °C) (Temporal)   Resp 18     PHYSICAL EXAM    General: The patient is in no acute distress. Mental status:  Patient is appropriate, is  oriented to place and plan of care.   Dermatologic exam: Visual inspection of the periwound reveals the skin to be normal in turgor and texture  Wound exam: see wound description below in procedure note      Assessment:     Problem List Items Addressed This Visit     Diabetic ulcer of left midfoot associated with type 2 diabetes mellitus, with necrosis of muscle ([address] - Primary    Relevant Orders    Initiate Outpatient Wound Care Protocol    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 ([address]        Procedure Note    Indications:  Based on my examination of this patient's wound(s) today, sharp excision into necrotic devitalized tissue is required to promote healing and evaluate the extent of previous healing. Performed by: RAFAEL Aguirre CNP    Consent obtained: Yes    Time out taken:  Yes    Pain Control: Anesthetic  Anesthetic: 5% Lidocaine Ointment Topical     Debridement:Excisional Debridement    Using curette the wound(s) was/were sharply debrided down through and including the removal of devitalized tissue. Devitalized Tissue Debrided:  slough and exudate    Pre Debridement Measurements:  Are located in the Wound Documentation Flow Sheet    All active wounds listed below with today's date are evaluated  Wound(s)    debrided this date include # : 1     Post  Debridement Measurements:  Wound 06/07/21 Toe (Comment  which one) Left;Plantar #1 (onset 5 months) Left Third Toe Plantar (Active)   Wound Image   06/07/21 1129   Wound Etiology Non-Healing Surgical 06/22/21 1432   Dressing Status New dressing applied;Clean;Dry; Intact 06/22/21 1509   Wound Cleansed Vashe 06/22/21 1432   Offloading for Diabetic Foot Ulcers Post op shoe 06/22/21 1432   Wound Length (cm) 10.5 cm 06/22/21 1432   Wound Width (cm) 0.4 cm 06/22/21 1432   Wound Depth (cm) 0.1 cm 06/22/21 1432   Wound Surface Area (cm^2) 4.2 cm^2 06/22/21 1432   Change in Wound Size % (l*w) -366.67 06/22/21 1432   Wound Volume (cm^3) 0.42 cm^3 06/22/21 1432   Wound Healing % -17 06/22/21 1432   Post-Procedure Length (cm) 0.5 cm 06/07/21 1143   Post-Procedure Width (cm) 1.8 cm 06/07/21 1143   Post-Procedure Depth (cm) 0.4 cm 06/07/21 1143   Post-Procedure Surface Area (cm^2) 0.9 cm^2 06/07/21 1143   Post-Procedure Volume (cm^3) 0.36 cm^3 06/07/21 1143   Distance Tunneling (cm) 0 cm 06/22/21 1432   Tunneling Position ___ O'Clock 0 06/22/21 1432   Undermining Starts ___ O'Clock 0 06/22/21 1432   Undermining Ends___ O'Clock 0 06/22/21 1432   Undermining Maxium Distance (cm) 0 06/22/21 1432   Wound Assessment Pink/red;Granulation tissue 06/09/21 0909   Drainage Amount Moderate 06/22/21 1432   Drainage Description Serosanguinous 06/22/21 1432   Odor None 06/22/21 1432   Rosalie-wound Assessment Other (Comment) 06/22/21 1432   Margins Defined edges 06/22/21 1432   Wound Thickness Description not for Pressure Injury Full thickness 06/09/21 0909   Number of days: 15       Wound 06/07/21 Foot Left;Plantar #2 (onset 5 months) Left Plantar (Active)   Wound Image   06/07/21 1129   Wound Etiology Diabetic Mathias 2 06/09/21 7801   Dressing Status New dressing applied;Clean;Dry; Intact 06/09/21 0938   Wound Cleansed Vashe;Irrigated with saline 06/09/21 0909   Offloading for Diabetic Foot Ulcers Forefoot offloading shoe 06/09/21 0909   Wound Length (cm) 0.9 cm 06/09/21 0909   Wound Width (cm) 0.5 cm 06/09/21 0909   Wound Depth (cm) 1.3 cm 06/09/21 0909   Wound Surface Area (cm^2) 0.45 cm^2 06/09/21 0909   Change in Wound Size % (l*w) 80.77 06/09/21 0909   Wound Volume (cm^3) 0.58 cm^3 06/09/21 0909   Wound Healing % 79 06/09/21 0909   Post-Procedure Length (cm) 1.8 cm 06/07/21 1143   Post-Procedure Width (cm) 1.3 cm 06/07/21 1143   Post-Procedure Depth (cm) 1.2 cm 06/07/21 1143   Post-Procedure Surface Area (cm^2) 2.34 cm^2 06/07/21 1143   Post-Procedure Volume (cm^3) 2.81 cm^3 06/07/21 1143   Distance Tunneling (cm) 0 cm 06/07/21 1129   Tunneling Position ___ O'Clock 0 06/07/21 1129   Undermining Starts ___ O'Clock 1200 06/09/21 0909   Undermining Ends___ O'Clock 1200 06/09/21 0909   Undermining Maxium Distance (cm) 0.3 06/09/21 0909   Wound Assessment Pink/red;Granulation tissue 06/09/21 0909   Drainage Amount Large 06/09/21 0909   Drainage Description Serosanguinous 06/09/21 0909   Odor None 06/09/21 0909   Rosalie-wound Assessment Hyperkeratosis (callous); Maceration 06/09/21 0909   Margins Defined and remove at bedtime.              If swelling is present, elevate legs to the level of the heart or above for 30 minutes 4-5 times a day and/or when sitting.                                      When taking antibiotics take entire prescription as ordered by physician do not stop taking until medicine is all gone.                                                           Orders for this week:  06/22/21                  Left 3rd toe and Plantar Foot amputation site-- Clean with soap and water, pat dry. Spray with anasept then: Cover with 4x4  gauze. Wrap with conform and secure with tape. Change daily as needed.      Give Forefoot Offloader 6/7/21  X-ray ordered 6/7/21  Referral to Dr Chad Samano 6/7/21     Follow up with Munira LYONS in the wound care center  Call [phone] for any questions or concerns.   Date__________   Time____________        Treatment Note Wound 06/07/21 Toe (Comment  which one) Left;Plantar #1 (onset 5 months) Left Third Toe Plantar-Dressing/Treatment:  (4x4, conform, tape)    Written Patient Dismissal Instructions Given            Electronically signed by RAFAEL Aguirre CNP on 6/22/2021 at 5:04 PM

## 2021-06-24 ENCOUNTER — HOSPITAL ENCOUNTER (OUTPATIENT)
Age: 41
Setting detail: SPECIMEN
Discharge: HOME | End: 2021-06-24
Payer: COMMERCIAL

## 2021-06-24 LAB
ANION GAP SERPL CALCULATED.3IONS-SCNC: 9 MMOL/L (ref 4–16)
BUN BLDV-MCNC: 11 MG/DL (ref 6–23)
CALCIUM SERPL-MCNC: 9.1 MG/DL (ref 8.3–10.6)
CHLORIDE BLD-SCNC: 102 MMOL/L (ref 99–110)
CO2: 25 MMOL/L (ref 21–32)
CREAT SERPL-MCNC: 0.6 MG/DL (ref 0.9–1.3)
DOSE AMOUNT: NORMAL
DOSE TIME: NORMAL
GFR AFRICAN AMERICAN: >60 ML/MIN/1.73M2
GFR NON-AFRICAN AMERICAN: >60 ML/MIN/1.73M2
GLUCOSE BLD-MCNC: 134 MG/DL (ref 70–99)
POTASSIUM SERPL-SCNC: 4.5 MMOL/L (ref 3.5–5.1)
SODIUM BLD-SCNC: 136 MMOL/L (ref 135–145)
VANCOMYCIN TROUGH: 13.1 UG/ML (ref 10–20)

## 2021-06-24 PROCEDURE — 80048 BASIC METABOLIC PNL TOTAL CA: CPT

## 2021-06-24 PROCEDURE — 80202 ASSAY OF VANCOMYCIN: CPT

## 2021-06-28 ENCOUNTER — HOSPITAL ENCOUNTER (OUTPATIENT)
Age: 41
Setting detail: SPECIMEN
Discharge: HOME | End: 2021-06-28
Payer: COMMERCIAL

## 2021-06-28 LAB
ANION GAP SERPL CALCULATED.3IONS-SCNC: 11 MMOL/L (ref 4–16)
BASOPHILS ABSOLUTE: 0.1 K/CU MM
BASOPHILS RELATIVE PERCENT: 0.7 % (ref 0–1)
BUN BLDV-MCNC: 11 MG/DL (ref 6–23)
C-REACTIVE PROTEIN, HIGH SENSITIVITY: 4.1 MG/L
CALCIUM SERPL-MCNC: 9.1 MG/DL (ref 8.3–10.6)
CHLORIDE BLD-SCNC: 101 MMOL/L (ref 99–110)
CO2: 26 MMOL/L (ref 21–32)
CREAT SERPL-MCNC: 0.7 MG/DL (ref 0.9–1.3)
DIFFERENTIAL TYPE: ABNORMAL
DOSE AMOUNT: NORMAL
DOSE TIME: NORMAL
EOSINOPHILS ABSOLUTE: 0.2 K/CU MM
EOSINOPHILS RELATIVE PERCENT: 2.7 % (ref 0–3)
ERYTHROCYTE SEDIMENTATION RATE: 40 MM/HR (ref 0–15)
GFR AFRICAN AMERICAN: >60 ML/MIN/1.73M2
GFR NON-AFRICAN AMERICAN: >60 ML/MIN/1.73M2
GLUCOSE BLD-MCNC: 162 MG/DL (ref 70–99)
HCT VFR BLD CALC: 38.5 % (ref 42–52)
HEMOGLOBIN: 12.2 GM/DL (ref 13.5–18)
IMMATURE NEUTROPHIL %: 0.3 % (ref 0–0.43)
LYMPHOCYTES ABSOLUTE: 1.6 K/CU MM
LYMPHOCYTES RELATIVE PERCENT: 20.3 % (ref 24–44)
MCH RBC QN AUTO: 29.3 PG (ref 27–31)
MCHC RBC AUTO-ENTMCNC: 31.7 % (ref 32–36)
MCV RBC AUTO: 92.5 FL (ref 78–100)
MONOCYTES ABSOLUTE: 0.7 K/CU MM
MONOCYTES RELATIVE PERCENT: 9.6 % (ref 0–4)
NUCLEATED RBC %: 0 %
PDW BLD-RTO: 13.1 % (ref 11.7–14.9)
PLATELET # BLD: 415 K/CU MM (ref 140–440)
PMV BLD AUTO: 9.9 FL (ref 7.5–11.1)
POTASSIUM SERPL-SCNC: 4 MMOL/L (ref 3.5–5.1)
RBC # BLD: 4.16 M/CU MM (ref 4.6–6.2)
SEGMENTED NEUTROPHILS ABSOLUTE COUNT: 5.1 K/CU MM
SEGMENTED NEUTROPHILS RELATIVE PERCENT: 66.4 % (ref 36–66)
SODIUM BLD-SCNC: 138 MMOL/L (ref 135–145)
TOTAL IMMATURE NEUTOROPHIL: 0.02 K/CU MM
TOTAL NUCLEATED RBC: 0 K/CU MM
VANCOMYCIN TROUGH: 14.8 UG/ML (ref 10–20)
WBC # BLD: 7.7 K/CU MM (ref 4–10.5)

## 2021-06-28 PROCEDURE — 85652 RBC SED RATE AUTOMATED: CPT

## 2021-06-28 PROCEDURE — 80202 ASSAY OF VANCOMYCIN: CPT

## 2021-06-28 PROCEDURE — 86140 C-REACTIVE PROTEIN: CPT

## 2021-06-28 PROCEDURE — 85025 COMPLETE CBC W/AUTO DIFF WBC: CPT

## 2021-06-28 PROCEDURE — 80048 BASIC METABOLIC PNL TOTAL CA: CPT

## 2021-06-29 ENCOUNTER — HOSPITAL ENCOUNTER (OUTPATIENT)
Dept: WOUND CARE | Age: 41
Discharge: HOME | End: 2021-06-29
Payer: COMMERCIAL

## 2021-06-29 VITALS
SYSTOLIC BLOOD PRESSURE: 156 MMHG | DIASTOLIC BLOOD PRESSURE: 87 MMHG | RESPIRATION RATE: 20 BRPM | HEART RATE: 78 BPM | TEMPERATURE: 98 F

## 2021-06-29 DIAGNOSIS — L97.423 DIABETIC ULCER OF LEFT MIDFOOT ASSOCIATED WITH TYPE 2 DIABETES MELLITUS, WITH NECROSIS OF MUSCLE (HCC): Primary | ICD-10-CM

## 2021-06-29 DIAGNOSIS — S98.132A AMPUTATION OF TOE OF LEFT FOOT (HCC): ICD-10-CM

## 2021-06-29 DIAGNOSIS — E11.621 DIABETIC ULCER OF LEFT MIDFOOT ASSOCIATED WITH TYPE 2 DIABETES MELLITUS, WITH NECROSIS OF MUSCLE (HCC): Primary | ICD-10-CM

## 2021-06-29 PROCEDURE — 11042 DBRDMT SUBQ TIS 1ST 20SQCM/<: CPT

## 2021-06-29 PROCEDURE — 97597 DBRDMT OPN WND 1ST 20 CM/<: CPT | Performed by: NURSE PRACTITIONER

## 2021-06-29 RX ORDER — LIDOCAINE HYDROCHLORIDE 20 MG/ML
JELLY TOPICAL ONCE
Status: CANCELLED | OUTPATIENT
Start: 2021-06-29 | End: 2021-06-29

## 2021-06-29 RX ORDER — LIDOCAINE 50 MG/G
OINTMENT TOPICAL ONCE
Status: CANCELLED | OUTPATIENT
Start: 2021-06-29 | End: 2021-06-29

## 2021-06-29 RX ORDER — BACITRACIN, NEOMYCIN, POLYMYXIN B 400; 3.5; 5 [USP'U]/G; MG/G; [USP'U]/G
OINTMENT TOPICAL ONCE
Status: CANCELLED | OUTPATIENT
Start: 2021-06-29 | End: 2021-06-29

## 2021-06-29 RX ORDER — BACITRACIN ZINC AND POLYMYXIN B SULFATE 500; 1000 [USP'U]/G; [USP'U]/G
OINTMENT TOPICAL ONCE
Status: CANCELLED | OUTPATIENT
Start: 2021-06-29 | End: 2021-06-29

## 2021-06-29 RX ORDER — GENTAMICIN SULFATE 1 MG/G
OINTMENT TOPICAL ONCE
Status: CANCELLED | OUTPATIENT
Start: 2021-06-29 | End: 2021-06-29

## 2021-06-29 RX ORDER — GINSENG 100 MG
CAPSULE ORAL ONCE
Status: CANCELLED | OUTPATIENT
Start: 2021-06-29 | End: 2021-06-29

## 2021-06-29 RX ORDER — BETAMETHASONE DIPROPIONATE 0.05 %
OINTMENT (GRAM) TOPICAL ONCE
Status: CANCELLED | OUTPATIENT
Start: 2021-06-29 | End: 2021-06-29

## 2021-06-29 RX ORDER — CLOBETASOL PROPIONATE 0.5 MG/G
OINTMENT TOPICAL ONCE
Status: CANCELLED | OUTPATIENT
Start: 2021-06-29 | End: 2021-06-29

## 2021-06-29 RX ORDER — LIDOCAINE 40 MG/G
CREAM TOPICAL ONCE
Status: CANCELLED | OUTPATIENT
Start: 2021-06-29 | End: 2021-06-29

## 2021-06-29 RX ORDER — LIDOCAINE HYDROCHLORIDE 40 MG/ML
SOLUTION TOPICAL ONCE
Status: CANCELLED | OUTPATIENT
Start: 2021-06-29 | End: 2021-06-29

## 2021-06-29 NOTE — PROGRESS NOTES
Wound Care Center Progress Note With Procedure    Madina Feliciano  AGE: 36 y.o. GENDER: male  : 1980  EPISODE DATE:  2021     Subjective:     Chief Complaint   Patient presents with    Wound Check     Left Foot          HISTORY of PRESENT ILLNESS     Lilian Graf a 36 y.o. male who presents today for wound evaluation of Chronic diabetic and pressure ulcer(s) of left third toe and medial aspect of the left plantar surface.  The ulcer is of moderate severity.  The underlying cause of the wound is diabetes and pressure. The patient had osteomyelitis left foot, he was admitted to [facility] -21, he had L foot OM of 3rd/4th metatarsal heads, 2-4 phalanges s/p partial left 3rd ray amputation on 6/15/21. He was sent home with a PIC and IV antibiotics Cefepime and Vancomycin through 21.  He is to be non-weight bearing.     Wound Pain Timing/Severity: intermittent, mild  Quality of pain: tender  Severity of pain:  2 / 10   Modifying Factors: diabetes, poor glucose control, chronic pressure and non-adherence  Associated Signs/Symptoms: drainage and pain        PAST MEDICAL HISTORY        Diagnosis Date    Callus of foot     Right foot - see's Dr. Douglas Yanez Diabetic ulcer of left midfoot associated with type 2 diabetes mellitus, with necrosis of muscle ([address] 2021    Diabetic ulcer of right midfoot associated with type 2 diabetes mellitus, with fat layer exposed ([address] 2020    Dizziness     positional    Essential hypertension     Follows with PCP    Hyperlipidemia     Lumbar radiculopathy     Septic embolism ([address] 2020       PAST SURGICAL HISTORY    Past Surgical History:   Procedure Laterality Date    ACHILLES TENDON SURGERY Right 2021    RIGHT ACHILLES TENDON LENGTHENING REPAIR performed by Ha Lemons DPM at [address] [facility]  2018 [facility]    DENTAL SURGERY      teeth extractions -half per patient    HERNIA REPAIR Bilateral 5/27/2020    BIALTERAL HERNIA INGUINAL REPAIR performed by Dom Tee MD at [facility]  2018    CA OFFICE/OUTPT VISIT,PROCEDURE ONLY Left 4/17/2018    L5-S1 HEMILAMINECTOMY, REMOVAL OF DISC LEFT SIDE performed by Sylwia Ibarra MD at [address] [facility] Right 1/8/2021    RIGHT TRANSMETATARSAL TOE AMPUTATION performed by Marly Lu DPM at [address] [facility]         FAMILY HISTORY    Family History   Problem Relation Age of Onset    Heart Disease Father     Diabetes Father     Diabetes Mother     Heart Disease Mother     Other Mother     Kidney Disease Mother     Heart Attack Mother        SOCIAL HISTORY    Social History     Tobacco Use    Smoking status: Never Smoker    Smokeless tobacco: Never Used   Vaping Use    Vaping Use: Never used   Substance Use Topics    Alcohol use: Yes     Comment: \"couple beers a night\"    Drug use: No       ALLERGIES    No Known Allergies    MEDICATIONS    Current Outpatient Medications on File Prior to Encounter   Medication Sig Dispense Refill    guaiFENesin (MUCINEX) 600 MG extended release tablet Take 1 tablet by mouth 2 times daily 20 tablet 0    benzonatate (TESSALON PERLES) 100 MG capsule Take 1 capsule by mouth 3 times daily as needed for Cough 20 capsule 0    ondansetron (ZOFRAN-ODT) 4 MG disintegrating tablet Take 1 tablet by mouth every 8 hours as needed for Nausea or Vomiting 15 tablet 0    metFORMIN (GLUCOPHAGE) 500 MG tablet Take 2 tablets by mouth 2 times daily (with meals) Indications: Start tomorrow 03/14 360 tablet 1    FLUoxetine (PROZAC) 40 MG capsule Take 1 capsule by mouth daily 30 capsule 5    lisinopril (PRINIVIL;ZESTRIL) 10 MG tablet Take 1 tablet by mouth daily 30 tablet 5    metoprolol succinate (TOPROL XL) 25 MG extended release tablet Take 1 tablet by mouth daily 30 tablet 5    atorvastatin (LIPITOR) 40 MG tablet Take 1 tablet by mouth nightly 30 tablet 3    glyBURIDE (DIABETA) 5 MG tablet Take 2 tablets by mouth 2 times daily (with meals) 360 tablet 1    omeprazole (PRILOSEC) 20 MG delayed release capsule TAKE 1 CAPSULE BY MOUTH ONCE DAILY IN THE MORNING 90 capsule 1    pioglitazone (ACTOS) 15 MG tablet Take 1 tablet by mouth daily (Patient taking differently: Take 15 mg by mouth daily Pt states PRN) 30 tablet 5    blood glucose monitor kit and supplies Dispense sufficient amount for indicated testing frequency plus additional to accommodate PRN testing needs. Dispense all needed supplies to include: monitor, strips, lancing device, lancets, control solutions, alcohol swabs. 1 kit 0    blood glucose monitor strips Test 2 times a day & as needed for symptoms of irregular blood glucose. Dispense sufficient amount for indicated testing frequency plus additional to accommodate PRN testing needs. 100 strip 0    FreeStyle Lancets MISC 1 each by Does not apply route daily 100 each 3    aspirin 81 MG tablet Take 81 mg by mouth daily      acetaminophen (TYLENOL) 325 MG tablet Take 2 tablets by mouth every 4 hours as needed for Pain or Fever 120 tablet 3     No current facility-administered medications on file prior to encounter. REVIEW OF SYSTEMS    Pertinent items are noted in HPI. Constitutional: Negative for systemic symptoms including fever, chills and malaise. Objective:      BP (!) 156/87   Pulse 78   Temp 98 °F (36.7 °C) (Temporal)   Resp 20     PHYSICAL EXAM    General: The patient is in no acute distress. Mental status:  Patient is appropriate, is  oriented to place and plan of care.   Dermatologic exam: Visual inspection of the periwound reveals the skin to be normal in turgor and texture  Wound exam: see wound description below in procedure note      Assessment:     Problem List Items Addressed This Visit     Diabetic ulcer of left midfoot associated with type 2 diabetes mellitus, with necrosis of muscle ([facility] - Primary    Relevant Orders    Initiate Outpatient Wound Care Protocol    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 ([facility]        Procedure Note    Indications:  Based on my examination of this patient's wound(s) today, sharp excision into necrotic devitalized tissue is required to promote healing and evaluate the extent of previous healing. Performed by: RAFAEL Mensah CNP    Consent obtained: Yes    Time out taken:  Yes    Pain Control: Anesthetic  Anesthetic: 4% Lidocaine Liquid Topical     Debridement:Excisional Debridement    Using curette the wound(s) was/were sharply debrided down through and including the removal of devitalized tissue. Devitalized Tissue Debrided:  slough and exudate    Pre Debridement Measurements:  Are located in the Wound Documentation Flow Sheet    All active wounds listed below with today's date are evaluated  Wound(s)    debrided this date include # : 1     Post  Debridement Measurements:  Incision 04/17/18 Back (Active)   Number of days: 9560       Wound 06/13/20 Tibial Right pt states reddened and rash like after a sunburn 6/11/2020 (Active)   Number of days: 381       Wound 06/07/21 Toe (Comment  which one) Left;Plantar #1 (onset 5 months) Left Third Toe Plantar (Active)   Wound Image   06/07/21 1129   Wound Etiology Non-Healing Surgical 06/29/21 1439   Dressing Status New dressing applied;Clean;Dry; Intact 06/29/21 1544   Wound Cleansed Vashe 06/29/21 1439   Offloading for Diabetic Foot Ulcers Post op shoe 06/29/21 1439   Wound Length (cm) 10.5 cm 06/29/21 1439   Wound Width (cm) 0.4 cm 06/29/21 1439   Wound Depth (cm) 0.1 cm 06/29/21 1439   Wound Surface Area (cm^2) 4.2 cm^2 06/29/21 1439   Change in Wound Size % (l*w) -366.67 06/29/21 1439   Wound Volume (cm^3) 0.42 cm^3 06/29/21 1439   Wound Healing % -17 06/29/21 1439   Post-Procedure Length (cm) 0.5 cm 06/07/21 1143   Post-Procedure Width (cm) 1.8 cm 06/07/21 1143   Post-Procedure Depth (cm) 0.4 cm 06/07/21 1143   Post-Procedure Surface Area (cm^2) 0.9 cm^2 06/07/21 1143   Post-Procedure Volume (cm^3) 0.36 cm^3 06/07/21 1143   Distance Tunneling (cm) 0 cm 06/29/21 1439   Tunneling Position ___ O'Clock 0 06/29/21 1439   Undermining Starts ___ O'Clock 0 06/29/21 1439   Undermining Ends___ O'Clock 0 06/29/21 1439   Undermining Maxium Distance (cm) 0 06/29/21 1439   Wound Assessment Pink/red;Granulation tissue 06/09/21 0909   Drainage Amount Moderate 06/29/21 1439   Drainage Description Serosanguinous 06/29/21 1439   Odor None 06/29/21 1439   Rosalie-wound Assessment Intact 06/29/21 1439   Margins Defined edges 06/29/21 1439   Wound Thickness Description not for Pressure Injury Full thickness 06/29/21 1439   Number of days: 22       Wound 06/07/21 Foot Left;Plantar #2 (onset 5 months) Left Plantar (Active)   Wound Image   06/07/21 1129   Wound Etiology Diabetic Mathias 2 06/09/21 1431   Dressing Status New dressing applied;Clean;Dry; Intact 06/09/21 0938   Wound Cleansed Vashe;Irrigated with saline 06/09/21 0909   Offloading for Diabetic Foot Ulcers Forefoot offloading shoe 06/09/21 0909   Wound Length (cm) 0.9 cm 06/09/21 0909   Wound Width (cm) 0.5 cm 06/09/21 0909   Wound Depth (cm) 1.3 cm 06/09/21 0909   Wound Surface Area (cm^2) 0.45 cm^2 06/09/21 0909   Change in Wound Size % (l*w) 80.77 06/09/21 0909   Wound Volume (cm^3) 0.58 cm^3 06/09/21 0909   Wound Healing % 79 06/09/21 0909   Post-Procedure Length (cm) 1.8 cm 06/07/21 1143   Post-Procedure Width (cm) 1.3 cm 06/07/21 1143   Post-Procedure Depth (cm) 1.2 cm 06/07/21 1143   Post-Procedure Surface Area (cm^2) 2.34 cm^2 06/07/21 1143   Post-Procedure Volume (cm^3) 2.81 cm^3 06/07/21 1143   Distance Tunneling (cm) 0 cm 06/07/21 1129   Tunneling Position ___ O'Clock 0 06/07/21 1129   Undermining Starts ___ O'Clock 1200 06/09/21 0909   Undermining Ends___ O'Clock 1200 06/09/21 0909   Undermining Maxium Distance (cm) 0.3 06/09/21 0909   Wound Assessment Pink/red;Granulation tissue 06/09/21 0909   Drainage Amount Large 06/09/21 0909   Drainage Description Serosanguinous 06/09/21 0909   Odor None 06/09/21 0909   Rosalie-wound Assessment Hyperkeratosis (callous); Maceration 06/09/21 0909   Margins Defined edges; Unattached edges 06/09/21 8884   Wound Thickness Description not for Pressure Injury Full thickness 06/09/21 0909   Number of days: 22     Total  Area  Debrided:  2 sq cm     Bleeding:  None    Hemostasis Achieved:  not needed    Procedural Pain:  0  / 10     Post Procedural Pain:  0 / 10     Response to treatment:  Well tolerated by patient. Status of wound progress and description from last visit: The patient had third toe amputation 6/15/21, He has sutures across the dorsal suture line and sutures and staples on th plantar suture line. Suture and staples intact without dehiscence or S&S infection. Will cleanse with Anaspet and keep covered with a dry dressing. The patient is non-weight bearing and continue IV antibiotics through 7/27/21. Plan:       Discharge Instructions         PHYSICIAN ORDERS AND DISCHARGE INSTRUCTIONS     NOTE: Upon discharge from the [address], you will receive a patient experience survey. We would be grateful if you would take the time to fill this survey out.     Wound care order history:                 YADY's   Right       Left               Date:              Cultures:                Grafts:                Antibiotics:           Continuing wound care orders and information:                 Residence:  Private              Continue home health care with: PICC LINE ONLY                Your wound-care supplies will be provided by:      Wound cleansing:               ON not scrub or use excessive force.              Wash hands with soap and water before and after dressing changes.               PFPRO to applying a clean dressing, cleanse wound with normal saline, wound cleanser, or mild soap and water.               Ask the physician or nurse before getting the wound(s) wet in a shower     Daily Wound management:              Keep weight off wounds and reposition every 2 hours.              Avoid standing for long periods of time.              Apply wraps/stockings in AM and remove at bedtime.              If swelling is present, elevate legs to the level of the heart or above for 30 minutes 4-5 times a day and/or when sitting.                                      When taking antibiotics take entire prescription as ordered by physician do not stop taking until medicine is all gone.                                                           Orders for this week:  06/29/21                  Left 3rd toe and Plantar Foot amputation site-- Spray with anasept wound , then: Cover with 4x4  gauze. Wrap with conform and secure with tape. Change daily as needed.      Give Forefoot Offloader 6/7/21  X-ray ordered 6/7/21  Referral to Dr Velasquez Oliver 6/7/21     Follow up with Munira LYONS in 1 week at the wound care center  Call [phone] for any questions or concerns.   Date__________   Time____________             Treatment Note Wound 06/07/21 Toe (Comment  which one) Left;Plantar #1 (onset 5 months) Left Third Toe Plantar-Dressing/Treatment:  (anasept spray, 4x4, conform tape )    Written Patient Dismissal Instructions Given            Electronically signed by RAFAEL Dey CNP on 6/29/2021 at 4:18 PM

## 2021-07-01 ENCOUNTER — HOSPITAL ENCOUNTER (OUTPATIENT)
Age: 41
Setting detail: SPECIMEN
Discharge: HOME | End: 2021-07-01
Payer: COMMERCIAL

## 2021-07-01 LAB
ANION GAP SERPL CALCULATED.3IONS-SCNC: 13 MMOL/L (ref 4–16)
BUN BLDV-MCNC: 9 MG/DL (ref 6–23)
CALCIUM SERPL-MCNC: 9.2 MG/DL (ref 8.3–10.6)
CHLORIDE BLD-SCNC: 99 MMOL/L (ref 99–110)
CO2: 24 MMOL/L (ref 21–32)
CREAT SERPL-MCNC: 0.7 MG/DL (ref 0.9–1.3)
DOSE AMOUNT: NORMAL
DOSE TIME: NORMAL
GFR AFRICAN AMERICAN: >60 ML/MIN/1.73M2
GFR NON-AFRICAN AMERICAN: >60 ML/MIN/1.73M2
GLUCOSE BLD-MCNC: 191 MG/DL (ref 70–99)
POTASSIUM SERPL-SCNC: 4 MMOL/L (ref 3.5–5.1)
SODIUM BLD-SCNC: 136 MMOL/L (ref 135–145)
VANCOMYCIN TROUGH: 13.1 UG/ML (ref 10–20)

## 2021-07-01 PROCEDURE — 80202 ASSAY OF VANCOMYCIN: CPT

## 2021-07-01 PROCEDURE — 80048 BASIC METABOLIC PNL TOTAL CA: CPT

## 2021-07-05 ENCOUNTER — HOSPITAL ENCOUNTER (OUTPATIENT)
Age: 41
Setting detail: SPECIMEN
Discharge: HOME | End: 2021-07-05
Payer: COMMERCIAL

## 2021-07-05 LAB
ANION GAP SERPL CALCULATED.3IONS-SCNC: 11 MMOL/L (ref 4–16)
BASOPHILS ABSOLUTE: 0 K/CU MM
BASOPHILS RELATIVE PERCENT: 0.6 % (ref 0–1)
BUN BLDV-MCNC: 9 MG/DL (ref 6–23)
C-REACTIVE PROTEIN, HIGH SENSITIVITY: 3.3 MG/L
CALCIUM SERPL-MCNC: 9.1 MG/DL (ref 8.3–10.6)
CHLORIDE BLD-SCNC: 101 MMOL/L (ref 99–110)
CO2: 27 MMOL/L (ref 21–32)
CREAT SERPL-MCNC: 0.7 MG/DL (ref 0.9–1.3)
DIFFERENTIAL TYPE: ABNORMAL
DOSE AMOUNT: NORMAL
DOSE TIME: NORMAL
EOSINOPHILS ABSOLUTE: 0.2 K/CU MM
EOSINOPHILS RELATIVE PERCENT: 3.5 % (ref 0–3)
ERYTHROCYTE SEDIMENTATION RATE: 25 MM/HR (ref 0–15)
GFR AFRICAN AMERICAN: >60 ML/MIN/1.73M2
GFR NON-AFRICAN AMERICAN: >60 ML/MIN/1.73M2
GLUCOSE BLD-MCNC: 149 MG/DL (ref 70–99)
HCT VFR BLD CALC: 39.2 % (ref 42–52)
HEMOGLOBIN: 12.7 GM/DL (ref 13.5–18)
IMMATURE NEUTROPHIL %: 0.3 % (ref 0–0.43)
LYMPHOCYTES ABSOLUTE: 2.2 K/CU MM
LYMPHOCYTES RELATIVE PERCENT: 31.8 % (ref 24–44)
MCH RBC QN AUTO: 29.5 PG (ref 27–31)
MCHC RBC AUTO-ENTMCNC: 32.4 % (ref 32–36)
MCV RBC AUTO: 91.2 FL (ref 78–100)
MONOCYTES ABSOLUTE: 0.8 K/CU MM
MONOCYTES RELATIVE PERCENT: 11.9 % (ref 0–4)
NUCLEATED RBC %: 0 %
PDW BLD-RTO: 13.9 % (ref 11.7–14.9)
PLATELET # BLD: 322 K/CU MM (ref 140–440)
PMV BLD AUTO: 10 FL (ref 7.5–11.1)
POTASSIUM SERPL-SCNC: 4.3 MMOL/L (ref 3.5–5.1)
RBC # BLD: 4.3 M/CU MM (ref 4.6–6.2)
SEGMENTED NEUTROPHILS ABSOLUTE COUNT: 3.6 K/CU MM
SEGMENTED NEUTROPHILS RELATIVE PERCENT: 51.9 % (ref 36–66)
SODIUM BLD-SCNC: 139 MMOL/L (ref 135–145)
TOTAL IMMATURE NEUTOROPHIL: 0.02 K/CU MM
TOTAL NUCLEATED RBC: 0 K/CU MM
VANCOMYCIN TROUGH: 15.2 UG/ML (ref 10–20)
WBC # BLD: 6.9 K/CU MM (ref 4–10.5)

## 2021-07-05 PROCEDURE — 80202 ASSAY OF VANCOMYCIN: CPT

## 2021-07-05 PROCEDURE — 85025 COMPLETE CBC W/AUTO DIFF WBC: CPT

## 2021-07-05 PROCEDURE — 80048 BASIC METABOLIC PNL TOTAL CA: CPT

## 2021-07-05 PROCEDURE — 85652 RBC SED RATE AUTOMATED: CPT

## 2021-07-05 PROCEDURE — 86140 C-REACTIVE PROTEIN: CPT

## 2021-07-06 ENCOUNTER — HOSPITAL ENCOUNTER (OUTPATIENT)
Dept: WOUND CARE | Age: 41
Discharge: HOME | End: 2021-07-06
Payer: COMMERCIAL

## 2021-07-06 VITALS
RESPIRATION RATE: 18 BRPM | TEMPERATURE: 97.3 F | SYSTOLIC BLOOD PRESSURE: 168 MMHG | HEART RATE: 83 BPM | DIASTOLIC BLOOD PRESSURE: 94 MMHG

## 2021-07-06 DIAGNOSIS — L97.423 DIABETIC ULCER OF LEFT MIDFOOT ASSOCIATED WITH TYPE 2 DIABETES MELLITUS, WITH NECROSIS OF MUSCLE (HCC): Primary | ICD-10-CM

## 2021-07-06 DIAGNOSIS — E11.621 DIABETIC ULCER OF LEFT MIDFOOT ASSOCIATED WITH TYPE 2 DIABETES MELLITUS, WITH NECROSIS OF MUSCLE (HCC): Primary | ICD-10-CM

## 2021-07-06 PROCEDURE — 97597 DBRDMT OPN WND 1ST 20 CM/<: CPT | Performed by: NURSE PRACTITIONER

## 2021-07-06 PROCEDURE — 97597 DBRDMT OPN WND 1ST 20 CM/<: CPT

## 2021-07-06 RX ORDER — LIDOCAINE HYDROCHLORIDE 40 MG/ML
SOLUTION TOPICAL ONCE
Status: CANCELLED | OUTPATIENT
Start: 2021-07-06 | End: 2021-07-06

## 2021-07-06 RX ORDER — LIDOCAINE 50 MG/G
OINTMENT TOPICAL ONCE
Status: CANCELLED | OUTPATIENT
Start: 2021-07-06 | End: 2021-07-06

## 2021-07-06 RX ORDER — GENTAMICIN SULFATE 1 MG/G
OINTMENT TOPICAL ONCE
Status: CANCELLED | OUTPATIENT
Start: 2021-07-06 | End: 2021-07-06

## 2021-07-06 RX ORDER — LIDOCAINE HYDROCHLORIDE 20 MG/ML
JELLY TOPICAL ONCE
Status: CANCELLED | OUTPATIENT
Start: 2021-07-06 | End: 2021-07-06

## 2021-07-06 RX ORDER — BACITRACIN ZINC AND POLYMYXIN B SULFATE 500; 1000 [USP'U]/G; [USP'U]/G
OINTMENT TOPICAL ONCE
Status: CANCELLED | OUTPATIENT
Start: 2021-07-06 | End: 2021-07-06

## 2021-07-06 RX ORDER — BACITRACIN, NEOMYCIN, POLYMYXIN B 400; 3.5; 5 [USP'U]/G; MG/G; [USP'U]/G
OINTMENT TOPICAL ONCE
Status: CANCELLED | OUTPATIENT
Start: 2021-07-06 | End: 2021-07-06

## 2021-07-06 RX ORDER — GINSENG 100 MG
CAPSULE ORAL ONCE
Status: CANCELLED | OUTPATIENT
Start: 2021-07-06 | End: 2021-07-06

## 2021-07-06 RX ORDER — CLOBETASOL PROPIONATE 0.5 MG/G
OINTMENT TOPICAL ONCE
Status: CANCELLED | OUTPATIENT
Start: 2021-07-06 | End: 2021-07-06

## 2021-07-06 RX ORDER — BETAMETHASONE DIPROPIONATE 0.05 %
OINTMENT (GRAM) TOPICAL ONCE
Status: CANCELLED | OUTPATIENT
Start: 2021-07-06 | End: 2021-07-06

## 2021-07-06 RX ORDER — LIDOCAINE 40 MG/G
CREAM TOPICAL ONCE
Status: CANCELLED | OUTPATIENT
Start: 2021-07-06 | End: 2021-07-06

## 2021-07-06 ASSESSMENT — PAIN SCALES - GENERAL: PAINLEVEL_OUTOF10: 0

## 2021-07-06 NOTE — PROGRESS NOTES
Wound Care Center Progress Note With Procedure    José Miguel Nath  AGE: 36 y.o. GENDER: male  : 1980  EPISODE DATE:  2021     Subjective:     Chief Complaint   Patient presents with    Wound Check         HISTORY of PRESENT ILLNESS     Tono Fulton a 36 y.o. male who presents today for wound evaluation of Chronic diabetic and pressure ulcer(s) of left third toe and medial aspect of the left plantar surface.  The ulcer is of moderate severity.  The underlying cause of the wound is diabetes and pressure. The patient had osteomyelitis left foot, he was admitted to [facility] -21, he had L foot OM of 3rd/4th metatarsal heads, 2-4 phalanges s/p partial left 3rd ray amputation on 6/15/21. He was sent home with a PIC and IV antibiotics Cefepime and Vancomycin through 21.  He is to be non-weight bearing.     Wound Pain Timing/Severity: intermittent, mild  Quality of pain: tender  Severity of pain:  1 / 10   Modifying Factors: diabetes, poor glucose control, chronic pressure and non-adherence  Associated Signs/Symptoms: drainage and pain        PAST MEDICAL HISTORY        Diagnosis Date    Callus of foot     Right foot - see's Dr. Aria Rubio Diabetic ulcer of left midfoot associated with type 2 diabetes mellitus, with necrosis of muscle ([address] 2021    Diabetic ulcer of right midfoot associated with type 2 diabetes mellitus, with fat layer exposed ([address] 2020    Dizziness     positional    Essential hypertension     Follows with PCP    Hyperlipidemia     Lumbar radiculopathy     Septic embolism ([address] 2020       PAST SURGICAL HISTORY    Past Surgical History:   Procedure Laterality Date    ACHILLES TENDON SURGERY Right 2021    RIGHT ACHILLES TENDON LENGTHENING REPAIR performed by Everardo Hurley DPM at [address] [facility]  2018    [facility]    DENTAL SURGERY      teeth extractions -half per patient    HERNIA REPAIR Bilateral 2020    BIALTERAL HERNIA INGUINAL REPAIR performed by Bren Garcia MD at [facility]  2018    [place] OFFICE/OUTPT VISIT,PROCEDURE ONLY Left 4/17/2018    L5-S1 HEMILAMINECTOMY, REMOVAL OF DISC LEFT SIDE performed by Thierry Forbes MD at [address] Right 1/8/2021    RIGHT TRANSMETATARSAL TOE AMPUTATION performed by Fatoumata Saunders DPM at [address] EXTRACTION         FAMILY HISTORY    Family History   Problem Relation Age of Onset    Heart Disease Father     Diabetes Father     Diabetes Mother     Heart Disease Mother     Other Mother     Kidney Disease Mother     Heart Attack Mother        SOCIAL HISTORY    Social History     Tobacco Use    Smoking status: Never Smoker    Smokeless tobacco: Never Used   Vaping Use    Vaping Use: Never used   Substance Use Topics    Alcohol use: Yes     Comment: \"couple beers a night\"    Drug use: No       ALLERGIES    No Known Allergies    MEDICATIONS    Current Outpatient Medications on File Prior to Encounter   Medication Sig Dispense Refill    guaiFENesin (MUCINEX) 600 MG extended release tablet Take 1 tablet by mouth 2 times daily 20 tablet 0    benzonatate (TESSALON PERLES) 100 MG capsule Take 1 capsule by mouth 3 times daily as needed for Cough 20 capsule 0    ondansetron (ZOFRAN-ODT) 4 MG disintegrating tablet Take 1 tablet by mouth every 8 hours as needed for Nausea or Vomiting 15 tablet 0    metFORMIN (GLUCOPHAGE) 500 MG tablet Take 2 tablets by mouth 2 times daily (with meals) Indications: Start tomorrow 03/14 360 tablet 1    FLUoxetine (PROZAC) 40 MG capsule Take 1 capsule by mouth daily 30 capsule 5    lisinopril (PRINIVIL;ZESTRIL) 10 MG tablet Take 1 tablet by mouth daily 30 tablet 5    metoprolol succinate (TOPROL XL) 25 MG extended release tablet Take 1 tablet by mouth daily 30 tablet 5    atorvastatin (LIPITOR) 40 MG tablet Take 1 tablet by mouth nightly 30 tablet 3    glyBURIDE (DIABETA) 5 MG tablet Take 2 tablets by mouth 2 times daily (with meals) 360 tablet 1    omeprazole (PRILOSEC) 20 MG delayed release capsule TAKE 1 CAPSULE BY MOUTH ONCE DAILY IN THE MORNING 90 capsule 1    pioglitazone (ACTOS) 15 MG tablet Take 1 tablet by mouth daily (Patient taking differently: Take 15 mg by mouth daily Pt states PRN) 30 tablet 5    blood glucose monitor kit and supplies Dispense sufficient amount for indicated testing frequency plus additional to accommodate PRN testing needs. Dispense all needed supplies to include: monitor, strips, lancing device, lancets, control solutions, alcohol swabs. 1 kit 0    blood glucose monitor strips Test 2 times a day & as needed for symptoms of irregular blood glucose. Dispense sufficient amount for indicated testing frequency plus additional to accommodate PRN testing needs. 100 strip 0    FreeStyle Lancets MISC 1 each by Does not apply route daily 100 each 3    aspirin 81 MG tablet Take 81 mg by mouth daily      acetaminophen (TYLENOL) 325 MG tablet Take 2 tablets by mouth every 4 hours as needed for Pain or Fever 120 tablet 3     No current facility-administered medications on file prior to encounter. REVIEW OF SYSTEMS    Pertinent items are noted in HPI. Constitutional: Negative for systemic symptoms including fever, chills and malaise. Objective:      BP (!) 168/94   Pulse 83   Temp 97.3 °F (36.3 °C) (Temporal)   Resp 18     PHYSICAL EXAM    General: The patient is in no acute distress. Mental status:  Patient is appropriate, is  oriented to place and plan of care.   Dermatologic exam: Visual inspection of the periwound reveals the skin to be normal in turgor and texture  Wound exam: see wound description below in procedure note      Assessment:     Problem List Items Addressed This Visit     Diabetic ulcer of left midfoot associated with type 2 diabetes mellitus, with necrosis of muscle ([facility] [address] [facility] - Primary    Relevant Orders    Initiate Outpatient Wound Care Protocol        Procedure Note    Indications:  Based on my examination of this patient's wound(s) today, sharp excision into necrotic devitalized tissue is required to promote healing and evaluate the extent of previous healing. Performed by: RAFAEL Guajardo CNP    Consent obtained: Yes    Time out taken:  Yes    Pain Control: Anesthetic  Anesthetic: 4% Lidocaine Liquid Topical        Debridement:Excisional Debridement    Using curette the wound(s) was/were sharply debrided down through and including the removal of devitalized tissue. Devitalized Tissue Debrided:  slough and exudate    Pre Debridement Measurements:  Are located in the Wound Documentation Flow Sheet    All active wounds listed below with today's date are evaluated  Wound(s)    debrided this date include # : 1     Post  Debridement Measurements:  Wound 06/07/21 Toe (Comment  which one) Left;Plantar #1 Left Third Toe Amp Site (Active)   Wound Image   07/06/21 1429   Wound Etiology Non-Healing Surgical 07/06/21 1429   Dressing Status New dressing applied;Clean;Dry; Intact 06/29/21 1544   Wound Cleansed Cleansed with saline 07/06/21 1429   Offloading for Diabetic Foot Ulcers Post op shoe 07/06/21 1429   Wound Length (cm) 9.5 cm 07/06/21 1429   Wound Width (cm) 0.4 cm 07/06/21 1429   Wound Depth (cm) 0.1 cm 07/06/21 1429   Wound Surface Area (cm^2) 3.8 cm^2 07/06/21 1429   Change in Wound Size % (l*w) -322.22 07/06/21 1429   Wound Volume (cm^3) 0.38 cm^3 07/06/21 1429   Wound Healing % -6 07/06/21 1429   Post-Procedure Length (cm) 9.5 cm 07/06/21 1436   Post-Procedure Width (cm) 0.4 cm 07/06/21 1436   Post-Procedure Depth (cm) 0.1 cm 07/06/21 1436   Post-Procedure Surface Area (cm^2) 3.8 cm^2 07/06/21 1436   Post-Procedure Volume (cm^3) 0.38 cm^3 07/06/21 1436   Distance Tunneling (cm) 0 cm 07/06/21 1429   Tunneling Position ___ O'Clock 0 07/06/21 1429   Undermining Starts ___ O'Clock 0 07/06/21 1429   Undermining Ends___ O'Clock 0 07/06/21 1429   Undermining Maxium Distance (cm) 0 07/06/21 1429   Wound Assessment Dry 07/06/21 1429   Drainage Amount Moderate 07/06/21 1429   Drainage Description Serosanguinous 07/06/21 1429   Odor None 07/06/21 1429   Rosalie-wound Assessment Intact 07/06/21 1429   Margins Defined edges 07/06/21 1429   Wound Thickness Description not for Pressure Injury Full thickness 07/06/21 1429   Number of days: 29       Total  Area  Debrided:  2 sq cm     Bleeding:  Minimal    Hemostasis Achieved:  by pressure    Procedural Pain:  0  / 10     Post Procedural Pain:  0 / 10     Response to treatment:  Well tolerated by patient. Status of wound progress and description from last visit: The patient had third toe amputation 6/15/21, He has sutures across the dorsal suture line and sutures and staples on th plantar suture line. Suture and staples intact without dehiscence or S&S infection. Will cleanse with Anaspet and keep covered with a dry dressing. The patient is non-weight bearing and continue IV antibiotics through 7/27/21. Plan:       Discharge Instructions       PHYSICIAN ORDERS AND DISCHARGE INSTRUCTIONS     NOTE: Upon discharge from the [address], you will receive a patient experience survey. We would be grateful if you would take the time to fill this survey out.     Wound care order history:                 YADY's   Right       Left               Date:              Cultures:                Grafts:                Antibiotics:           Continuing wound care orders and information:                 Residence:  Private              Continue home health care with: PICC LINE ONLY                Your wound-care supplies will be provided by:      Wound cleansing:               NU not scrub or use excessive force.              Wash hands with soap and water before and after dressing changes.             GMWDR to applying a clean dressing, cleanse wound with normal saline, wound cleanser, or mild soap and water.             Ask the physician or nurse before getting the wound(s) wet in a shower     Daily Wound management:              Keep weight off wounds and reposition every 2 hours.              Avoid standing for long periods of time.              Apply wraps/stockings in AM and remove at bedtime.              If swelling is present, elevate legs to the level of the heart or above for 30 minutes 4-5 times a day and/or when sitting.                                      When taking antibiotics take entire prescription as ordered by physician do not stop taking until medicine is all gone.                                                           Orders for this week:  07/6/21                  Left 3rd toe and Plantar Foot amputation site-- Spray with anasept wound , then: Cover with 4x4  gauze. Wrap with conform and secure with tape. Change daily as needed.      Give Forefoot Offloader 6/7/21  X-ray ordered 6/7/21  Referral to Dr Kaylie Spain 6/7/21     Follow up with Munira LYONS in 1 week at the wound care center  Call [phone] for any questions or concerns.   Date__________   Time____________        Treatment Note      Written Patient Dismissal Instructions Given            Electronically signed by RAFAEL Ya CNP on 7/6/2021 at 2:43 PM

## 2021-07-12 ENCOUNTER — HOSPITAL ENCOUNTER (OUTPATIENT)
Age: 41
Setting detail: SPECIMEN
Discharge: HOME | End: 2021-07-12
Payer: COMMERCIAL

## 2021-07-12 LAB
ANION GAP SERPL CALCULATED.3IONS-SCNC: 12 MMOL/L (ref 4–16)
BASOPHILS ABSOLUTE: 0 K/CU MM
BASOPHILS RELATIVE PERCENT: 0.6 % (ref 0–1)
BUN BLDV-MCNC: 10 MG/DL (ref 6–23)
C-REACTIVE PROTEIN, HIGH SENSITIVITY: 4.5 MG/L
CALCIUM SERPL-MCNC: 9.5 MG/DL (ref 8.3–10.6)
CHLORIDE BLD-SCNC: 96 MMOL/L (ref 99–110)
CO2: 26 MMOL/L (ref 21–32)
CREAT SERPL-MCNC: 0.7 MG/DL (ref 0.9–1.3)
DIFFERENTIAL TYPE: ABNORMAL
DOSE AMOUNT: NORMAL
DOSE TIME: NORMAL
EOSINOPHILS ABSOLUTE: 0.2 K/CU MM
EOSINOPHILS RELATIVE PERCENT: 2.9 % (ref 0–3)
ERYTHROCYTE SEDIMENTATION RATE: 25 MM/HR (ref 0–15)
GFR AFRICAN AMERICAN: >60 ML/MIN/1.73M2
GFR NON-AFRICAN AMERICAN: >60 ML/MIN/1.73M2
GLUCOSE BLD-MCNC: 188 MG/DL (ref 70–99)
HCT VFR BLD CALC: 41 % (ref 42–52)
HEMOGLOBIN: 13.8 GM/DL (ref 13.5–18)
IMMATURE NEUTROPHIL %: 0.3 % (ref 0–0.43)
LYMPHOCYTES ABSOLUTE: 1.8 K/CU MM
LYMPHOCYTES RELATIVE PERCENT: 26.8 % (ref 24–44)
MCH RBC QN AUTO: 31.2 PG (ref 27–31)
MCHC RBC AUTO-ENTMCNC: 33.7 % (ref 32–36)
MCV RBC AUTO: 92.6 FL (ref 78–100)
MONOCYTES ABSOLUTE: 0.6 K/CU MM
MONOCYTES RELATIVE PERCENT: 9.1 % (ref 0–4)
NUCLEATED RBC %: 0 %
PDW BLD-RTO: 14.8 % (ref 11.7–14.9)
PLATELET # BLD: 305 K/CU MM (ref 140–440)
PMV BLD AUTO: 9.3 FL (ref 7.5–11.1)
POTASSIUM SERPL-SCNC: 4.1 MMOL/L (ref 3.5–5.1)
RBC # BLD: 4.43 M/CU MM (ref 4.6–6.2)
SEGMENTED NEUTROPHILS ABSOLUTE COUNT: 4 K/CU MM
SEGMENTED NEUTROPHILS RELATIVE PERCENT: 60.3 % (ref 36–66)
SODIUM BLD-SCNC: 134 MMOL/L (ref 135–145)
TOTAL IMMATURE NEUTOROPHIL: 0.02 K/CU MM
TOTAL NUCLEATED RBC: 0 K/CU MM
VANCOMYCIN TROUGH: 11.8 UG/ML (ref 10–20)
WBC # BLD: 6.6 K/CU MM (ref 4–10.5)

## 2021-07-12 PROCEDURE — 85652 RBC SED RATE AUTOMATED: CPT

## 2021-07-12 PROCEDURE — 80202 ASSAY OF VANCOMYCIN: CPT

## 2021-07-12 PROCEDURE — 85025 COMPLETE CBC W/AUTO DIFF WBC: CPT

## 2021-07-12 PROCEDURE — 80048 BASIC METABOLIC PNL TOTAL CA: CPT

## 2021-07-12 PROCEDURE — 86140 C-REACTIVE PROTEIN: CPT

## 2021-07-13 ENCOUNTER — HOSPITAL ENCOUNTER (OUTPATIENT)
Dept: WOUND CARE | Age: 41
Discharge: HOME | End: 2021-07-13
Payer: COMMERCIAL

## 2021-07-13 VITALS
HEART RATE: 77 BPM | TEMPERATURE: 97 F | RESPIRATION RATE: 18 BRPM | SYSTOLIC BLOOD PRESSURE: 151 MMHG | DIASTOLIC BLOOD PRESSURE: 91 MMHG

## 2021-07-13 DIAGNOSIS — E11.621 DIABETIC ULCER OF LEFT MIDFOOT ASSOCIATED WITH TYPE 2 DIABETES MELLITUS, WITH NECROSIS OF MUSCLE (HCC): Primary | ICD-10-CM

## 2021-07-13 DIAGNOSIS — S98.132A AMPUTATION OF TOE OF LEFT FOOT (HCC): ICD-10-CM

## 2021-07-13 DIAGNOSIS — L97.423 DIABETIC ULCER OF LEFT MIDFOOT ASSOCIATED WITH TYPE 2 DIABETES MELLITUS, WITH NECROSIS OF MUSCLE (HCC): Primary | ICD-10-CM

## 2021-07-13 PROCEDURE — 87070 CULTURE OTHR SPECIMN AEROBIC: CPT

## 2021-07-13 PROCEDURE — 87075 CULTR BACTERIA EXCEPT BLOOD: CPT

## 2021-07-13 PROCEDURE — 11042 DBRDMT SUBQ TIS 1ST 20SQCM/<: CPT | Performed by: NURSE PRACTITIONER

## 2021-07-13 PROCEDURE — 11042 DBRDMT SUBQ TIS 1ST 20SQCM/<: CPT

## 2021-07-13 RX ORDER — LIDOCAINE HYDROCHLORIDE 40 MG/ML
SOLUTION TOPICAL ONCE
Status: CANCELLED | OUTPATIENT
Start: 2021-07-13 | End: 2021-07-13

## 2021-07-13 RX ORDER — CLOBETASOL PROPIONATE 0.5 MG/G
OINTMENT TOPICAL ONCE
Status: CANCELLED | OUTPATIENT
Start: 2021-07-13 | End: 2021-07-13

## 2021-07-13 RX ORDER — BETAMETHASONE DIPROPIONATE 0.05 %
OINTMENT (GRAM) TOPICAL ONCE
Status: CANCELLED | OUTPATIENT
Start: 2021-07-13 | End: 2021-07-13

## 2021-07-13 RX ORDER — LIDOCAINE HYDROCHLORIDE 20 MG/ML
JELLY TOPICAL ONCE
Status: CANCELLED | OUTPATIENT
Start: 2021-07-13 | End: 2021-07-13

## 2021-07-13 RX ORDER — GENTAMICIN SULFATE 1 MG/G
OINTMENT TOPICAL ONCE
Status: CANCELLED | OUTPATIENT
Start: 2021-07-13 | End: 2021-07-13

## 2021-07-13 RX ORDER — BACITRACIN ZINC AND POLYMYXIN B SULFATE 500; 1000 [USP'U]/G; [USP'U]/G
OINTMENT TOPICAL ONCE
Status: CANCELLED | OUTPATIENT
Start: 2021-07-13 | End: 2021-07-13

## 2021-07-13 RX ORDER — LIDOCAINE 40 MG/G
CREAM TOPICAL ONCE
Status: CANCELLED | OUTPATIENT
Start: 2021-07-13 | End: 2021-07-13

## 2021-07-13 RX ORDER — GINSENG 100 MG
CAPSULE ORAL ONCE
Status: CANCELLED | OUTPATIENT
Start: 2021-07-13 | End: 2021-07-13

## 2021-07-13 RX ORDER — LIDOCAINE 50 MG/G
OINTMENT TOPICAL ONCE
Status: CANCELLED | OUTPATIENT
Start: 2021-07-13 | End: 2021-07-13

## 2021-07-13 RX ORDER — BACITRACIN, NEOMYCIN, POLYMYXIN B 400; 3.5; 5 [USP'U]/G; MG/G; [USP'U]/G
OINTMENT TOPICAL ONCE
Status: CANCELLED | OUTPATIENT
Start: 2021-07-13 | End: 2021-07-13

## 2021-07-13 ASSESSMENT — PAIN SCALES - GENERAL: PAINLEVEL_OUTOF10: 0

## 2021-07-13 NOTE — PROGRESS NOTES
Wound Care Center Progress Note With Procedure    Jasson Hull  AGE: 36 y.o. GENDER: male  : 1980  EPISODE DATE:  2021     Subjective:     Chief Complaint   Patient presents with    Wound Check         HISTORY of PRESENT ILLNESS   Oumou Blair a 36 y.o. male who presents today for wound evaluation of Chronic diabetic and pressure ulcer(s) of left third toe and medial aspect of the left plantar surface.  The ulcer is of mild severity.  The underlying cause of the wound is diabetes and pressure. The patient had osteomyelitis left foot, he was admitted to [facility] -21, he had left foot OM of 3rd/4th metatarsal heads, 2-4 phalanges s/p partial left 3rd ray amputation on 6/15/21. He was sent home with a PIC and IV antibiotics Cefepime and Vancomycin through 21.  He is to be non-weight bearing.     Wound Pain Timing/Severity: None  Quality of pain: N/A  Severity of pain:  0 / 10   Modifying Factors: diabetes, poor glucose control, chronic pressure and non-adherence  Associated Signs/Symptoms: drainage                                   PAST MEDICAL HISTORY        Diagnosis Date    Callus of foot     Right foot - see's Dr. Duong Hendrix Diabetic ulcer of left midfoot associated with type 2 diabetes mellitus, with necrosis of muscle ([address] 2021    Diabetic ulcer of right midfoot associated with type 2 diabetes mellitus, with fat layer exposed ([address] 2020    Dizziness     positional    Essential hypertension     Follows with PCP    Hyperlipidemia     Lumbar radiculopathy     Septic embolism ([address] 2020       PAST SURGICAL HISTORY    Past Surgical History:   Procedure Laterality Date    ACHILLES TENDON SURGERY Right 2021    RIGHT ACHILLES TENDON LENGTHENING REPAIR performed by Lopez Mares DPM at [address]  2018    [facility]    DENTAL SURGERY      teeth extractions -half per patient    HERNIA REPAIR Bilateral 2020    BIALTERAL HERNIA INGUINAL REPAIR performed by Nicole Sanders MD at [address] [place] [address]  2018    [place] OFFICE/OUTPT VISIT,PROCEDURE ONLY Left 4/17/2018    L5-S1 HEMILAMINECTOMY, REMOVAL OF DISC LEFT SIDE performed by Tameka Bingham MD at [address] Right 1/8/2021    RIGHT TRANSMETATARSAL TOE AMPUTATION performed by Saul Whalen DPM at [address] EXTRACTION         FAMILY HISTORY    Family History   Problem Relation Age of Onset    Heart Disease Father     Diabetes Father     Diabetes Mother     Heart Disease Mother     Other Mother     Kidney Disease Mother     Heart Attack Mother        SOCIAL HISTORY    Social History     Tobacco Use    Smoking status: Never Smoker    Smokeless tobacco: Never Used   Vaping Use    Vaping Use: Never used   Substance Use Topics    Alcohol use: Yes     Comment: \"couple beers a night\"    Drug use: No       ALLERGIES    No Known Allergies    MEDICATIONS    Current Outpatient Medications on File Prior to Encounter   Medication Sig Dispense Refill    guaiFENesin (MUCINEX) 600 MG extended release tablet Take 1 tablet by mouth 2 times daily 20 tablet 0    benzonatate (TESSALON PERLES) 100 MG capsule Take 1 capsule by mouth 3 times daily as needed for Cough 20 capsule 0    ondansetron (ZOFRAN-ODT) 4 MG disintegrating tablet Take 1 tablet by mouth every 8 hours as needed for Nausea or Vomiting 15 tablet 0    metFORMIN (GLUCOPHAGE) 500 MG tablet Take 2 tablets by mouth 2 times daily (with meals) Indications: Start tomorrow 03/14 360 tablet 1    FLUoxetine (PROZAC) 40 MG capsule Take 1 capsule by mouth daily 30 capsule 5    lisinopril (PRINIVIL;ZESTRIL) 10 MG tablet Take 1 tablet by mouth daily 30 tablet 5    metoprolol succinate (TOPROL XL) 25 MG extended release tablet Take 1 tablet by mouth daily 30 tablet 5    atorvastatin (LIPITOR) 40 MG tablet Take 1 tablet by mouth nightly 30 tablet 3    omeprazole (PRILOSEC) 20 MG delayed release capsule TAKE 1 CAPSULE BY MOUTH ONCE DAILY IN THE MORNING 90 capsule 1    blood glucose monitor kit and supplies Dispense sufficient amount for indicated testing frequency plus additional to accommodate PRN testing needs. Dispense all needed supplies to include: monitor, strips, lancing device, lancets, control solutions, alcohol swabs. 1 kit 0    blood glucose monitor strips Test 2 times a day & as needed for symptoms of irregular blood glucose. Dispense sufficient amount for indicated testing frequency plus additional to accommodate PRN testing needs. 100 strip 0    FreeStyle Lancets MISC 1 each by Does not apply route daily 100 each 3    aspirin 81 MG tablet Take 81 mg by mouth daily      acetaminophen (TYLENOL) 325 MG tablet Take 2 tablets by mouth every 4 hours as needed for Pain or Fever 120 tablet 3    glyBURIDE (DIABETA) 5 MG tablet Take 2 tablets by mouth 2 times daily (with meals) 360 tablet 1    pioglitazone (ACTOS) 15 MG tablet Take 1 tablet by mouth daily (Patient taking differently: Take 15 mg by mouth daily Pt states PRN) 30 tablet 5     No current facility-administered medications on file prior to encounter. REVIEW OF SYSTEMS    Pertinent items are noted in HPI. Constitutional: Negative for systemic symptoms including fever, chills and malaise. Objective:      BP (!) 151/91   Pulse 77   Temp 97 °F (36.1 °C) (Temporal)   Resp 18     PHYSICAL EXAM    General: The patient is in no acute distress. Mental status:  Patient is appropriate, is  oriented to place and plan of care.   Dermatologic exam: Visual inspection of the periwound reveals the skin to be normal in turgor and texture  Wound exam: see wound description below in procedure note      Assessment:     Problem List Items Addressed This Visit     Diabetic ulcer of left midfoot associated with type 2 diabetes mellitus, with necrosis of muscle ([facility] [address] - Primary    Relevant Orders    Initiate Outpatient Wound Care Protocol    Culture, Wound    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 ([address]    Relevant Orders    Culture, Wound        Procedure Note    Indications:  Based on my examination of this patient's wound(s) today, sharp excision into necrotic subcutaneous tissue is required to promote healing and evaluate the extent of previous healing. Performed by: RAFAEL Feliz CNP    Consent obtained: Yes    Time out taken:  Yes    Pain Control: Anesthetic  Anesthetic: 4% Lidocaine Liquid Topical        Debridement:Excisional Debridement    Using curette, forceps and tissue nippers the wound(s) was/were sharply debrided down through and including the removal of subcutaneous tissue.         Devitalized Tissue Debrided:  slough, exudate and callus    Pre Debridement Measurements:  Are located in the Wound Documentation Flow Sheet    All active wounds listed below with today's date are evaluated  Wound(s)    debrided this date include # : 1     Post  Debridement Measurements:  Incision 04/17/18 Back (Active)   Number of days: 1183       Wound 06/13/20 Tibial Right pt states reddened and rash like after a sunburn 6/11/2020 (Active)   Number of days: 395       Wound 06/07/21 Toe (Comment  which one) Left;Plantar #1 Left Third Toe Amp Site (Active)   Wound Image   07/06/21 1429   Wound Etiology Non-Healing Surgical 07/13/21 1434   Dressing Status New dressing applied 07/06/21 1719   Wound Cleansed Cleansed with saline 07/13/21 1434   Offloading for Diabetic Foot Ulcers Post op shoe 07/13/21 1434   Wound Length (cm) 9 cm 07/13/21 1434   Wound Width (cm) 0.3 cm 07/13/21 1434   Wound Depth (cm) 0.1 cm 07/13/21 1434   Wound Surface Area (cm^2) 2.7 cm^2 07/13/21 1434   Change in Wound Size % (l*w) -200 07/13/21 1434   Wound Volume (cm^3) 0.27 cm^3 07/13/21 1434   Wound Healing % 25 07/13/21 1434   Post-Procedure Length (cm) 9 cm 07/13/21 1456   Post-Procedure Width (cm) 0.3 cm 07/13/21 1456   Post-Procedure Depth (cm) 0.1 cm 07/13/21 1456   Post-Procedure Surface Area (cm^2) 2.7 cm^2 07/13/21 1456   Post-Procedure Volume (cm^3) 0.27 cm^3 07/13/21 1456   Distance Tunneling (cm) 0 cm 07/13/21 1434   Tunneling Position ___ O'Clock 0 07/13/21 1434   Undermining Starts ___ O'Clock 0 07/13/21 1434   Undermining Ends___ O'Clock 0 07/13/21 1434   Undermining Maxium Distance (cm) 0 07/13/21 1434   Wound Assessment Dry 07/13/21 1434   Drainage Amount Small 07/13/21 1434   Drainage Description Serosanguinous 07/13/21 1434   Odor None 07/13/21 1434   Rosalie-wound Assessment Intact 07/13/21 1434   Margins Defined edges 07/13/21 1434   Wound Thickness Description not for Pressure Injury Full thickness 07/13/21 1434   Number of days: 36       Total  Area  Debrided:  2 sq cm     Bleeding:  Minimal    Hemostasis Achieved:  by pressure    Procedural Pain:  0  / 10     Post Procedural Pain:  0 / 10     Response to treatment:  Well tolerated by patient. Status of wound progress and description from last visit: The staples and sutures were removed from the plantar foot post op day #27. There was a small area of dehiscence mid plantar incision. The area was clean, no S&S infection, the area was cultured. Collagen placed in the wound bed and the wound and remaining suture line steri stripped with an opening left to drain. Plan:       Discharge Instructions       PHYSICIAN ORDERS AND DISCHARGE INSTRUCTIONS     NOTE: Upon discharge from the [address], you will receive a patient experience survey.  We would be grateful if you would take the time to fill this survey out.     Wound care order history:                 YADY's   Right       Left               Date:              Cultures:                Grafts:                Antibiotics:           Continuing wound care orders and information:                 Residence:  Private              Continue home health care with: PICC LINE ONLY                Your wound-care supplies will be provided by:      Wound cleansing:               EN not scrub or use excessive force.              Wash hands with soap and water before and after dressing changes.             DUJCD to applying a clean dressing, cleanse wound with normal saline, wound cleanser, or mild soap and water.               Ask the physician or nurse before getting the wound(s) wet in a shower     Daily Wound management:              Keep weight off wounds and reposition every 2 hours.              Avoid standing for long periods of time.              Apply wraps/stockings in AM and remove at bedtime.              If swelling is present, elevate legs to the level of the heart or above for 30 minutes 4-5 times a day and/or when sitting.                                      When taking antibiotics take entire prescription as ordered by physician do not stop taking until medicine is all gone.                                                           Orders for this week:  07/13/21                  Left 3rd toe and Plantar Foot amputation site-- Spray with anasept wound , then: pack with ariadna and Cover with 4x4  gauze. Wrap with conform and secure with tape. Change daily as needed.      Give Forefoot Offloader 6/7/21  X-ray ordered 6/7/21  Referral to Dr Leonardo Geronimo 6/7/21     Follow up with Munira LYONS in 1 week at the wound care center  Call [phone] for any questions or concerns.   Date__________   Time____________        Treatment Note Wound 06/07/21 Toe (Comment  which one) Left;Plantar #1 Left Third Toe Amp Site-Dressing/Treatment:  ( 4x4 gauze conform tape)    Written Patient Dismissal Instructions Given            Electronically signed by RAFAEL Sales CNP on 7/13/2021 at 3:22 PM

## 2021-07-19 ENCOUNTER — TELEPHONE (OUTPATIENT)
Dept: FAMILY MEDICINE CLINIC | Age: 41
End: 2021-07-19

## 2021-07-19 ENCOUNTER — HOSPITAL ENCOUNTER (OUTPATIENT)
Age: 41
Setting detail: SPECIMEN
Discharge: HOME | End: 2021-07-19
Payer: COMMERCIAL

## 2021-07-19 LAB
ANION GAP SERPL CALCULATED.3IONS-SCNC: 14 MMOL/L (ref 4–16)
BASOPHILS ABSOLUTE: 0 K/CU MM
BASOPHILS RELATIVE PERCENT: 0.5 % (ref 0–1)
BUN BLDV-MCNC: 9 MG/DL (ref 6–23)
C-REACTIVE PROTEIN, HIGH SENSITIVITY: 3.5 MG/L
CALCIUM SERPL-MCNC: 9 MG/DL (ref 8.3–10.6)
CHLORIDE BLD-SCNC: 99 MMOL/L (ref 99–110)
CO2: 26 MMOL/L (ref 21–32)
CREAT SERPL-MCNC: 0.7 MG/DL (ref 0.9–1.3)
CULTURE: NORMAL
DIFFERENTIAL TYPE: ABNORMAL
DOSE AMOUNT: NORMAL
DOSE TIME: NORMAL
EOSINOPHILS ABSOLUTE: 0.2 K/CU MM
EOSINOPHILS RELATIVE PERCENT: 2.5 % (ref 0–3)
ERYTHROCYTE SEDIMENTATION RATE: 16 MM/HR (ref 0–15)
GFR AFRICAN AMERICAN: >60 ML/MIN/1.73M2
GFR NON-AFRICAN AMERICAN: >60 ML/MIN/1.73M2
GLUCOSE BLD-MCNC: 126 MG/DL (ref 70–99)
HCT VFR BLD CALC: 42.9 % (ref 42–52)
HEMOGLOBIN: 14.5 GM/DL (ref 13.5–18)
IMMATURE NEUTROPHIL %: 0.3 % (ref 0–0.43)
LYMPHOCYTES ABSOLUTE: 1.5 K/CU MM
LYMPHOCYTES RELATIVE PERCENT: 25.5 % (ref 24–44)
Lab: NORMAL
MCH RBC QN AUTO: 31.5 PG (ref 27–31)
MCHC RBC AUTO-ENTMCNC: 33.8 % (ref 32–36)
MCV RBC AUTO: 93.3 FL (ref 78–100)
MONOCYTES ABSOLUTE: 0.5 K/CU MM
MONOCYTES RELATIVE PERCENT: 9.1 % (ref 0–4)
NUCLEATED RBC %: 0 %
PDW BLD-RTO: 15.9 % (ref 11.7–14.9)
PLATELET # BLD: 305 K/CU MM (ref 140–440)
PMV BLD AUTO: 9.3 FL (ref 7.5–11.1)
POTASSIUM SERPL-SCNC: 4.2 MMOL/L (ref 3.5–5.1)
RBC # BLD: 4.6 M/CU MM (ref 4.6–6.2)
SEGMENTED NEUTROPHILS ABSOLUTE COUNT: 3.7 K/CU MM
SEGMENTED NEUTROPHILS RELATIVE PERCENT: 62.1 % (ref 36–66)
SODIUM BLD-SCNC: 139 MMOL/L (ref 135–145)
SPECIMEN: NORMAL
TOTAL IMMATURE NEUTOROPHIL: 0.02 K/CU MM
TOTAL NUCLEATED RBC: 0 K/CU MM
VANCOMYCIN TROUGH: 15.9 UG/ML (ref 10–20)
WBC # BLD: 6 K/CU MM (ref 4–10.5)

## 2021-07-19 PROCEDURE — 85652 RBC SED RATE AUTOMATED: CPT

## 2021-07-19 PROCEDURE — 80202 ASSAY OF VANCOMYCIN: CPT

## 2021-07-19 PROCEDURE — 85025 COMPLETE CBC W/AUTO DIFF WBC: CPT

## 2021-07-19 PROCEDURE — 86140 C-REACTIVE PROTEIN: CPT

## 2021-07-19 PROCEDURE — 80048 BASIC METABOLIC PNL TOTAL CA: CPT

## 2021-07-19 NOTE — TELEPHONE ENCOUNTER
Needs an order for a Cath Flow-----also, reports rash around the [facility] line site.

## 2021-07-20 ENCOUNTER — HOSPITAL ENCOUNTER (OUTPATIENT)
Dept: WOUND CARE | Age: 41
Discharge: HOME | End: 2021-07-20
Payer: COMMERCIAL

## 2021-07-20 VITALS
HEART RATE: 78 BPM | TEMPERATURE: 97.1 F | DIASTOLIC BLOOD PRESSURE: 100 MMHG | SYSTOLIC BLOOD PRESSURE: 148 MMHG | RESPIRATION RATE: 18 BRPM

## 2021-07-20 DIAGNOSIS — L97.423 DIABETIC ULCER OF LEFT MIDFOOT ASSOCIATED WITH TYPE 2 DIABETES MELLITUS, WITH NECROSIS OF MUSCLE (HCC): Primary | ICD-10-CM

## 2021-07-20 DIAGNOSIS — E11.621 DIABETIC ULCER OF LEFT MIDFOOT ASSOCIATED WITH TYPE 2 DIABETES MELLITUS, WITH NECROSIS OF MUSCLE (HCC): Primary | ICD-10-CM

## 2021-07-20 DIAGNOSIS — S98.132A AMPUTATION OF TOE OF LEFT FOOT (HCC): ICD-10-CM

## 2021-07-20 PROCEDURE — 11042 DBRDMT SUBQ TIS 1ST 20SQCM/<: CPT | Performed by: NURSE PRACTITIONER

## 2021-07-20 PROCEDURE — 11042 DBRDMT SUBQ TIS 1ST 20SQCM/<: CPT

## 2021-07-20 RX ORDER — LIDOCAINE 40 MG/G
CREAM TOPICAL ONCE
Status: CANCELLED | OUTPATIENT
Start: 2021-07-20 | End: 2021-07-20

## 2021-07-20 RX ORDER — LIDOCAINE HYDROCHLORIDE 20 MG/ML
JELLY TOPICAL ONCE
Status: CANCELLED | OUTPATIENT
Start: 2021-07-20 | End: 2021-07-20

## 2021-07-20 RX ORDER — CLOBETASOL PROPIONATE 0.5 MG/G
OINTMENT TOPICAL ONCE
Status: CANCELLED | OUTPATIENT
Start: 2021-07-20 | End: 2021-07-20

## 2021-07-20 RX ORDER — BACITRACIN, NEOMYCIN, POLYMYXIN B 400; 3.5; 5 [USP'U]/G; MG/G; [USP'U]/G
OINTMENT TOPICAL ONCE
Status: CANCELLED | OUTPATIENT
Start: 2021-07-20 | End: 2021-07-20

## 2021-07-20 RX ORDER — LIDOCAINE HYDROCHLORIDE 40 MG/ML
SOLUTION TOPICAL ONCE
Status: CANCELLED | OUTPATIENT
Start: 2021-07-20 | End: 2021-07-20

## 2021-07-20 RX ORDER — GINSENG 100 MG
CAPSULE ORAL ONCE
Status: CANCELLED | OUTPATIENT
Start: 2021-07-20 | End: 2021-07-20

## 2021-07-20 RX ORDER — BETAMETHASONE DIPROPIONATE 0.05 %
OINTMENT (GRAM) TOPICAL ONCE
Status: CANCELLED | OUTPATIENT
Start: 2021-07-20 | End: 2021-07-20

## 2021-07-20 RX ORDER — GENTAMICIN SULFATE 1 MG/G
OINTMENT TOPICAL ONCE
Status: CANCELLED | OUTPATIENT
Start: 2021-07-20 | End: 2021-07-20

## 2021-07-20 RX ORDER — BACITRACIN ZINC AND POLYMYXIN B SULFATE 500; 1000 [USP'U]/G; [USP'U]/G
OINTMENT TOPICAL ONCE
Status: CANCELLED | OUTPATIENT
Start: 2021-07-20 | End: 2021-07-20

## 2021-07-20 RX ORDER — LIDOCAINE 50 MG/G
OINTMENT TOPICAL ONCE
Status: CANCELLED | OUTPATIENT
Start: 2021-07-20 | End: 2021-07-20

## 2021-07-20 ASSESSMENT — PAIN SCALES - GENERAL: PAINLEVEL_OUTOF10: 0

## 2021-07-20 NOTE — PROGRESS NOTES
[facility]    DENTAL SURGERY      teeth extractions -half per patient    HERNIA REPAIR Bilateral 5/27/2020    BIALTERAL HERNIA INGUINAL REPAIR performed by Cortez Hernandez MD at [address] [place] [address]  2018    [place] OFFICE/OUTPT VISIT,PROCEDURE ONLY Left 4/17/2018    L5-S1 HEMILAMINECTOMY, REMOVAL OF DISC LEFT SIDE performed by Juan Santoro MD at [address] Right 1/8/2021    RIGHT TRANSMETATARSAL TOE AMPUTATION performed by Polo Cohen DPM at [address] EXTRACTION         FAMILY HISTORY    Family History   Problem Relation Age of Onset    Heart Disease Father     Diabetes Father     Diabetes Mother     Heart Disease Mother     Other Mother     Kidney Disease Mother     Heart Attack Mother        SOCIAL HISTORY    Social History     Tobacco Use    Smoking status: Never Smoker    Smokeless tobacco: Never Used   Vaping Use    Vaping Use: Never used   Substance Use Topics    Alcohol use: Yes     Comment: \"couple beers a night\"    Drug use: No       ALLERGIES    No Known Allergies    MEDICATIONS    Current Outpatient Medications on File Prior to Encounter   Medication Sig Dispense Refill    guaiFENesin (MUCINEX) 600 MG extended release tablet Take 1 tablet by mouth 2 times daily 20 tablet 0    benzonatate (TESSALON PERLES) 100 MG capsule Take 1 capsule by mouth 3 times daily as needed for Cough 20 capsule 0    ondansetron (ZOFRAN-ODT) 4 MG disintegrating tablet Take 1 tablet by mouth every 8 hours as needed for Nausea or Vomiting 15 tablet 0    metFORMIN (GLUCOPHAGE) 500 MG tablet Take 2 tablets by mouth 2 times daily (with meals) Indications: Start tomorrow 03/14 360 tablet 1    FLUoxetine (PROZAC) 40 MG capsule Take 1 capsule by mouth daily 30 capsule 5    lisinopril (PRINIVIL;ZESTRIL) 10 MG tablet Take 1 tablet by mouth daily 30 tablet 5    metoprolol succinate (TOPROL XL) 25 MG extended release tablet Take 1 tablet by mouth daily 30 tablet 5  atorvastatin (LIPITOR) 40 MG tablet Take 1 tablet by mouth nightly 30 tablet 3    omeprazole (PRILOSEC) 20 MG delayed release capsule TAKE 1 CAPSULE BY MOUTH ONCE DAILY IN THE MORNING 90 capsule 1    blood glucose monitor kit and supplies Dispense sufficient amount for indicated testing frequency plus additional to accommodate PRN testing needs. Dispense all needed supplies to include: monitor, strips, lancing device, lancets, control solutions, alcohol swabs. 1 kit 0    blood glucose monitor strips Test 2 times a day & as needed for symptoms of irregular blood glucose. Dispense sufficient amount for indicated testing frequency plus additional to accommodate PRN testing needs. 100 strip 0    FreeStyle Lancets MISC 1 each by Does not apply route daily 100 each 3    aspirin 81 MG tablet Take 81 mg by mouth daily      acetaminophen (TYLENOL) 325 MG tablet Take 2 tablets by mouth every 4 hours as needed for Pain or Fever 120 tablet 3    glyBURIDE (DIABETA) 5 MG tablet Take 2 tablets by mouth 2 times daily (with meals) 360 tablet 1    pioglitazone (ACTOS) 15 MG tablet Take 1 tablet by mouth daily (Patient taking differently: Take 15 mg by mouth daily Pt states PRN) 30 tablet 5     No current facility-administered medications on file prior to encounter. REVIEW OF SYSTEMS    Pertinent items are noted in HPI. Constitutional: Negative for systemic symptoms including fever, chills and malaise. Objective:      BP (!) 148/100   Pulse 78   Temp 97.1 °F (36.2 °C) (Temporal)   Resp 18     PHYSICAL EXAM    General: The patient is in no acute distress. Mental status:  Patient is appropriate, is  oriented to place and plan of care.   Dermatologic exam: Visual inspection of the periwound reveals the skin to be normal in turgor and texture  Wound exam: see wound description below in procedure note      Assessment:     Problem List Items Addressed This Visit     Diabetic ulcer of left midfoot associated with type 2 diabetes mellitus, with necrosis of muscle ([address] - Primary    Relevant Orders    Initiate Outpatient Wound Care Protocol    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 ([facility] [address]        Procedure Note    Indications:  Based on my examination of this patient's wound(s) today, sharp excision into necrotic subcutaneous tissue is required to promote healing and evaluate the extent of previous healing. Performed by: RAFAEL Owen - CNP    Consent obtained: Yes    Time out taken:  Yes    Pain Control: Anesthetic  Anesthetic: 4% Lidocaine Liquid Topical        Debridement:Excisional Debridement    Using curette the wound(s) was/were sharply debrided down through and including the removal of subcutaneous tissue.         Devitalized Tissue Debrided:  slough and exudate    Pre Debridement Measurements:  Are located in the Wound Documentation Flow Sheet    All active wounds listed below with today's date are evaluated  Wound(s)    debrided this date include # : 1     Post  Debridement Measurements:  Wound 06/07/21 Toe (Comment  which one) Left;Plantar #1 Left Third Toe Amp Site (Active)   Wound Image   07/20/21 1434   Wound Etiology Non-Healing Surgical 07/20/21 1434   Dressing Status New dressing applied 07/06/21 1719   Wound Cleansed Cleansed with saline 07/20/21 1434   Offloading for Diabetic Foot Ulcers Post op shoe 07/20/21 1434   Wound Length (cm) 5.5 cm 07/20/21 1434   Wound Width (cm) 0.3 cm 07/20/21 1434   Wound Depth (cm) 0.1 cm 07/20/21 1434   Wound Surface Area (cm^2) 1.65 cm^2 07/20/21 1434   Change in Wound Size % (l*w) -83.33 07/20/21 1434   Wound Volume (cm^3) 0.165 cm^3 07/20/21 1434   Wound Healing % 54 07/20/21 1434   Post-Procedure Length (cm) 5.5 cm 07/20/21 1453   Post-Procedure Width (cm) 0.3 cm 07/20/21 1453   Post-Procedure Depth (cm) 0.1 cm 07/20/21 1453   Post-Procedure Surface Area (cm^2) 1.65 cm^2 07/20/21 1453   Post-Procedure Volume (cm^3) 0.165 cm^3 07/20/21 1453   Distance Tunneling (cm) 0 cm 07/20/21 1434   Tunneling Position ___ O'Clock 0 07/20/21 1434   Undermining Starts ___ O'Clock 0 07/20/21 1434   Undermining Ends___ O'Clock 0 07/20/21 1434   Undermining Maxium Distance (cm) 0 07/20/21 1434   Wound Assessment Dry 07/20/21 1434   Drainage Amount Small 07/20/21 1434   Drainage Description Serosanguinous 07/20/21 1434   Odor None 07/20/21 1434   Rosalie-wound Assessment Intact 07/20/21 1434   Margins Defined edges 07/20/21 1434   Wound Thickness Description not for Pressure Injury Full thickness 07/20/21 1434   Number of days: 43       Percent of Wound(s) Debrided: approximately 100%    Total  Area  Debrided:  1.65 sq cm     Bleeding:  Minimal    Hemostasis Achieved:  by pressure    Procedural Pain:  0  / 10     Post Procedural Pain:  0 / 10     Response to treatment:  Well tolerated by patient. Status of wound progress and description from last visit: The staples and sutures were removed from the plantar foot post op day #27. There was a small area of dehiscence mid plantar incision. The area remains clean, no S&S infection, the wound is smaller and the steri strips were intact from last visit. Replaced collagen placed in the wound bed and the wound and remaining suture line steri stripped with an opening left to drain. The remaining sutures were removed and the skin was well approximated, steri strips placed over the incision. The wound culture was negative for growth. Plan:       Discharge Instructions       PHYSICIAN ORDERS AND DISCHARGE INSTRUCTIONS     NOTE: Upon discharge from the [address], you will receive a patient experience survey.  We would be grateful if you would take the time to fill this survey out.     Wound care order history:                 YADY's   Right       Left               Date:              Cultures:                Grafts:                Antibiotics:           Continuing wound care orders and information:                 Residence:  Private              Continue home health care with: PICC LINE ONLY                Your wound-care supplies will be provided by:      Wound cleansing:               PJ not scrub or use excessive force.              Wash hands with soap and water before and after dressing changes.             [facility] to applying a clean dressing, cleanse wound with normal saline, wound cleanser, or mild soap and water.               Ask the physician or nurse before getting the wound(s) wet in a shower     Daily Wound management:              Keep weight off wounds and reposition every 2 hours.              Avoid standing for long periods of time.              Apply wraps/stockings in AM and remove at bedtime.              If swelling is present, elevate legs to the level of the heart or above for 30 minutes 4-5 times a day and/or when sitting.                                      When taking antibiotics take entire prescription as ordered by physician do not stop taking until medicine is all gone.                                                           Orders for this week:  07/20/21                  Left 3rd toe and Plantar Foot amputation site-- Spray with anasept wound , then: pack with ariadna and Cover with 4x4  gauze. Wrap with conform and secure with tape. Change daily as needed.      Give Forefoot Offloader 6/7/21  X-ray ordered 6/7/21  Referral to Dr Swathi Vazquez 6/7/21     Follow up with Munira LYONS in 1 week at the wound care center  Call [phone] for any questions or concerns.   Date__________   Time____________        Treatment Note      Written Patient Dismissal Instructions Given            Electronically signed by RAFAEL Singh CNP on 7/20/2021 at 3:18 PM

## 2021-07-26 ENCOUNTER — HOSPITAL ENCOUNTER (OUTPATIENT)
Age: 41
Setting detail: SPECIMEN
Discharge: HOME | End: 2021-07-26
Payer: COMMERCIAL

## 2021-07-26 LAB
ANION GAP SERPL CALCULATED.3IONS-SCNC: 12 MMOL/L (ref 4–16)
BASOPHILS ABSOLUTE: 0 K/CU MM
BASOPHILS RELATIVE PERCENT: 0.4 % (ref 0–1)
BUN BLDV-MCNC: 10 MG/DL (ref 6–23)
C-REACTIVE PROTEIN, HIGH SENSITIVITY: 4.9 MG/L
CALCIUM SERPL-MCNC: 9.2 MG/DL (ref 8.3–10.6)
CHLORIDE BLD-SCNC: 102 MMOL/L (ref 99–110)
CO2: 25 MMOL/L (ref 21–32)
CREAT SERPL-MCNC: 0.7 MG/DL (ref 0.9–1.3)
DIFFERENTIAL TYPE: ABNORMAL
DOSE AMOUNT: NORMAL
DOSE TIME: NORMAL
EOSINOPHILS ABSOLUTE: 0.1 K/CU MM
EOSINOPHILS RELATIVE PERCENT: 1.9 % (ref 0–3)
ERYTHROCYTE SEDIMENTATION RATE: 8 MM/HR (ref 0–15)
GFR AFRICAN AMERICAN: >60 ML/MIN/1.73M2
GFR NON-AFRICAN AMERICAN: >60 ML/MIN/1.73M2
GLUCOSE BLD-MCNC: 180 MG/DL (ref 70–99)
HCT VFR BLD CALC: 45 % (ref 42–52)
HEMOGLOBIN: 14.4 GM/DL (ref 13.5–18)
IMMATURE NEUTROPHIL %: 0.3 % (ref 0–0.43)
LYMPHOCYTES ABSOLUTE: 1.5 K/CU MM
LYMPHOCYTES RELATIVE PERCENT: 21.6 % (ref 24–44)
MCH RBC QN AUTO: 30.6 PG (ref 27–31)
MCHC RBC AUTO-ENTMCNC: 32 % (ref 32–36)
MCV RBC AUTO: 95.7 FL (ref 78–100)
MONOCYTES ABSOLUTE: 0.6 K/CU MM
MONOCYTES RELATIVE PERCENT: 8.2 % (ref 0–4)
NUCLEATED RBC %: 0 %
PDW BLD-RTO: 15.7 % (ref 11.7–14.9)
PLATELET # BLD: 346 K/CU MM (ref 140–440)
PMV BLD AUTO: 9.5 FL (ref 7.5–11.1)
POTASSIUM SERPL-SCNC: 3.9 MMOL/L (ref 3.5–5.1)
RBC # BLD: 4.7 M/CU MM (ref 4.6–6.2)
SEGMENTED NEUTROPHILS ABSOLUTE COUNT: 4.6 K/CU MM
SEGMENTED NEUTROPHILS RELATIVE PERCENT: 67.6 % (ref 36–66)
SODIUM BLD-SCNC: 139 MMOL/L (ref 135–145)
TOTAL IMMATURE NEUTOROPHIL: 0.02 K/CU MM
TOTAL NUCLEATED RBC: 0 K/CU MM
VANCOMYCIN TROUGH: 10.6 UG/ML (ref 10–20)
WBC # BLD: 6.8 K/CU MM (ref 4–10.5)

## 2021-07-26 PROCEDURE — 86140 C-REACTIVE PROTEIN: CPT

## 2021-07-26 PROCEDURE — 85025 COMPLETE CBC W/AUTO DIFF WBC: CPT

## 2021-07-26 PROCEDURE — 85652 RBC SED RATE AUTOMATED: CPT

## 2021-07-26 PROCEDURE — 80202 ASSAY OF VANCOMYCIN: CPT

## 2021-07-26 PROCEDURE — 80048 BASIC METABOLIC PNL TOTAL CA: CPT

## 2021-07-27 ENCOUNTER — HOSPITAL ENCOUNTER (OUTPATIENT)
Dept: WOUND CARE | Age: 41
Discharge: HOME | End: 2021-07-27
Payer: COMMERCIAL

## 2021-07-27 VITALS
DIASTOLIC BLOOD PRESSURE: 91 MMHG | HEART RATE: 86 BPM | TEMPERATURE: 97.4 F | RESPIRATION RATE: 18 BRPM | SYSTOLIC BLOOD PRESSURE: 158 MMHG

## 2021-07-27 DIAGNOSIS — E11.621 DIABETIC ULCER OF LEFT MIDFOOT ASSOCIATED WITH TYPE 2 DIABETES MELLITUS, WITH NECROSIS OF MUSCLE (HCC): Primary | ICD-10-CM

## 2021-07-27 DIAGNOSIS — S98.132A AMPUTATION OF TOE OF LEFT FOOT (HCC): ICD-10-CM

## 2021-07-27 DIAGNOSIS — L97.423 DIABETIC ULCER OF LEFT MIDFOOT ASSOCIATED WITH TYPE 2 DIABETES MELLITUS, WITH NECROSIS OF MUSCLE (HCC): Primary | ICD-10-CM

## 2021-07-27 PROCEDURE — 11042 DBRDMT SUBQ TIS 1ST 20SQCM/<: CPT | Performed by: NURSE PRACTITIONER

## 2021-07-27 PROCEDURE — 11042 DBRDMT SUBQ TIS 1ST 20SQCM/<: CPT

## 2021-07-27 RX ORDER — CLOBETASOL PROPIONATE 0.5 MG/G
OINTMENT TOPICAL ONCE
Status: CANCELLED | OUTPATIENT
Start: 2021-07-27 | End: 2021-07-27

## 2021-07-27 RX ORDER — GINSENG 100 MG
CAPSULE ORAL ONCE
Status: CANCELLED | OUTPATIENT
Start: 2021-07-27 | End: 2021-07-27

## 2021-07-27 RX ORDER — LIDOCAINE 40 MG/G
CREAM TOPICAL ONCE
Status: CANCELLED | OUTPATIENT
Start: 2021-07-27 | End: 2021-07-27

## 2021-07-27 RX ORDER — LIDOCAINE HYDROCHLORIDE 40 MG/ML
SOLUTION TOPICAL ONCE
Status: CANCELLED | OUTPATIENT
Start: 2021-07-27 | End: 2021-07-27

## 2021-07-27 RX ORDER — BETAMETHASONE DIPROPIONATE 0.05 %
OINTMENT (GRAM) TOPICAL ONCE
Status: CANCELLED | OUTPATIENT
Start: 2021-07-27 | End: 2021-07-27

## 2021-07-27 RX ORDER — LIDOCAINE 50 MG/G
OINTMENT TOPICAL ONCE
Status: CANCELLED | OUTPATIENT
Start: 2021-07-27 | End: 2021-07-27

## 2021-07-27 RX ORDER — LIDOCAINE HYDROCHLORIDE 40 MG/ML
SOLUTION TOPICAL ONCE
Status: DISCONTINUED | OUTPATIENT
Start: 2021-07-27 | End: 2021-07-28 | Stop reason: HOSPADM

## 2021-07-27 RX ORDER — GENTAMICIN SULFATE 1 MG/G
OINTMENT TOPICAL ONCE
Status: CANCELLED | OUTPATIENT
Start: 2021-07-27 | End: 2021-07-27

## 2021-07-27 RX ORDER — LIDOCAINE HYDROCHLORIDE 20 MG/ML
JELLY TOPICAL ONCE
Status: CANCELLED | OUTPATIENT
Start: 2021-07-27 | End: 2021-07-27

## 2021-07-27 RX ORDER — BACITRACIN, NEOMYCIN, POLYMYXIN B 400; 3.5; 5 [USP'U]/G; MG/G; [USP'U]/G
OINTMENT TOPICAL ONCE
Status: CANCELLED | OUTPATIENT
Start: 2021-07-27 | End: 2021-07-27

## 2021-07-27 RX ORDER — BACITRACIN ZINC AND POLYMYXIN B SULFATE 500; 1000 [USP'U]/G; [USP'U]/G
OINTMENT TOPICAL ONCE
Status: CANCELLED | OUTPATIENT
Start: 2021-07-27 | End: 2021-07-27

## 2021-07-27 NOTE — PROGRESS NOTES
Wound Care Center Progress Note With Procedure    Kunal Guzman  AGE: 36 y.o. GENDER: male  : 1980  EPISODE DATE:  2021     Subjective:     Chief Complaint   Patient presents with    Wound Check     Left plantar         HISTORY of PRESENT ILLNESS     Liat Tang a 36 y.o. male who presents today for wound evaluation of Chronic diabetic and pressure ulcer(s) of left third toe and medial aspect of the left plantar surface.  The ulcer is of mild severity.  The underlying cause of the wound is diabetes and pressure. The patient had osteomyelitis left foot, he was admitted to [facility] -21, he had left foot OM of 3rd/4th metatarsal heads, 2-4 phalanges s/p partial left 3rd ray amputation on 6/15/21. He was sent home with a PIC and IV antibiotics Cefepime and Vancomycin through 21. He is to be non-weight bearing.     The patient is a diabetic on an oral regimen, last A1c was 7.8 on 21. He is a nonsmoker and is not on anticoagulants beyond baby aspirin.     Wound Pain Timing/Severity: None  Quality of pain: N/A  Severity of pain:  0 / 10   Modifying Factors: diabetes, poor glucose control, chronic pressure and non-adherence  Associated Signs/Symptoms: drainage         PAST MEDICAL HISTORY        Diagnosis Date    Callus of foot     Right foot - see's Dr. Shashi Montes Diabetic ulcer of left midfoot associated with type 2 diabetes mellitus, with necrosis of muscle ([address] 2021    Diabetic ulcer of right midfoot associated with type 2 diabetes mellitus, with fat layer exposed ([address] 2020    Dizziness     positional    Essential hypertension     Follows with PCP    Hyperlipidemia     Lumbar radiculopathy     Septic embolism ([address] 2020       PAST SURGICAL HISTORY    Past Surgical History:   Procedure Laterality Date    ACHILLES TENDON SURGERY Right 2021    RIGHT ACHILLES TENDON LENGTHENING REPAIR performed by John Covarrubias DPM at [address] [place] [address] 03/2018    [facility]    DENTAL SURGERY      teeth extractions -half per patient    HERNIA REPAIR Bilateral 5/27/2020    BIALTERAL HERNIA INGUINAL REPAIR performed by Marisol Hull MD at [address] [place] [address]  2018    [place] OFFICE/OUTPT VISIT,PROCEDURE ONLY Left 4/17/2018    L5-S1 HEMILAMINECTOMY, REMOVAL OF DISC LEFT SIDE performed by Gwen Kerr MD at [address] Right 1/8/2021    RIGHT TRANSMETATARSAL TOE AMPUTATION performed by Kevin Hernández DPM at [address] EXTRACTION         FAMILY HISTORY    Family History   Problem Relation Age of Onset    Heart Disease Father     Diabetes Father     Diabetes Mother     Heart Disease Mother     Other Mother     Kidney Disease Mother     Heart Attack Mother        SOCIAL HISTORY    Social History     Tobacco Use    Smoking status: Never Smoker    Smokeless tobacco: Never Used   Vaping Use    Vaping Use: Never used   Substance Use Topics    Alcohol use: Yes     Comment: \"couple beers a night\"    Drug use: No       ALLERGIES    No Known Allergies    MEDICATIONS    Current Outpatient Medications on File Prior to Encounter   Medication Sig Dispense Refill    guaiFENesin (MUCINEX) 600 MG extended release tablet Take 1 tablet by mouth 2 times daily 20 tablet 0    benzonatate (TESSALON PERLES) 100 MG capsule Take 1 capsule by mouth 3 times daily as needed for Cough 20 capsule 0    ondansetron (ZOFRAN-ODT) 4 MG disintegrating tablet Take 1 tablet by mouth every 8 hours as needed for Nausea or Vomiting 15 tablet 0    metFORMIN (GLUCOPHAGE) 500 MG tablet Take 2 tablets by mouth 2 times daily (with meals) Indications: Start tomorrow 03/14 360 tablet 1    FLUoxetine (PROZAC) 40 MG capsule Take 1 capsule by mouth daily 30 capsule 5    lisinopril (PRINIVIL;ZESTRIL) 10 MG tablet Take 1 tablet by mouth daily 30 tablet 5    metoprolol succinate (TOPROL XL) 25 MG extended release tablet Take 1 tablet by mouth daily 30 tablet 5    atorvastatin (LIPITOR) 40 MG tablet Take 1 tablet by mouth nightly 30 tablet 3    omeprazole (PRILOSEC) 20 MG delayed release capsule TAKE 1 CAPSULE BY MOUTH ONCE DAILY IN THE MORNING 90 capsule 1    blood glucose monitor kit and supplies Dispense sufficient amount for indicated testing frequency plus additional to accommodate PRN testing needs. Dispense all needed supplies to include: monitor, strips, lancing device, lancets, control solutions, alcohol swabs. 1 kit 0    blood glucose monitor strips Test 2 times a day & as needed for symptoms of irregular blood glucose. Dispense sufficient amount for indicated testing frequency plus additional to accommodate PRN testing needs. 100 strip 0    FreeStyle Lancets MISC 1 each by Does not apply route daily 100 each 3    aspirin 81 MG tablet Take 81 mg by mouth daily      acetaminophen (TYLENOL) 325 MG tablet Take 2 tablets by mouth every 4 hours as needed for Pain or Fever 120 tablet 3    glyBURIDE (DIABETA) 5 MG tablet Take 2 tablets by mouth 2 times daily (with meals) 360 tablet 1    pioglitazone (ACTOS) 15 MG tablet Take 1 tablet by mouth daily (Patient taking differently: Take 15 mg by mouth daily Pt states PRN) 30 tablet 5     No current facility-administered medications on file prior to encounter. REVIEW OF SYSTEMS    Pertinent items are noted in HPI. Constitutional: Negative for systemic symptoms including fever, chills and malaise. Objective:      BP (!) 158/91   Pulse 86   Temp 97.4 °F (36.3 °C) (Temporal)   Resp 18     PHYSICAL EXAM    General: The patient is in no acute distress. Mental status:  Patient is appropriate, is  oriented to place and plan of care.   Dermatologic exam: Visual inspection of the periwound reveals the skin to be normal in turgor and texture  Wound exam: see wound description below in procedure note      Assessment:     Problem List Items Addressed This Visit     Diabetic ulcer of left midfoot associated with type 2 diabetes mellitus, with necrosis of muscle ([facility]) - Primary    Relevant Medications    lidocaine (XYLOCAINE) 4 % external solution    Other Relevant Orders    Initiate Outpatient Wound Care Protocol    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 ([facility] [address] [facility]        Procedure Note    Indications:  Based on my examination of this patient's wound(s) today, sharp excision into necrotic subcutaneous tissue is required to promote healing and evaluate the extent of previous healing. Performed by: RAFAEL Espana - CNP    Consent obtained: Yes    Time out taken:  Yes    Pain Control: Anesthetic  Anesthetic: 4% Lidocaine Liquid Topical     Debridement:Excisional Debridement    Using curette the wound(s) was/were sharply debrided down through and including the removal of subcutaneous tissue. Devitalized Tissue Debrided:  slough and exudate    Pre Debridement Measurements:  Are located in the Wound Documentation Flow Sheet    All active wounds listed below with today's date are evaluated  Wound(s)    debrided this date include # : 1     Post  Debridement Measurements:  Wound 06/07/21 Toe (Comment  which one) Left;Plantar #1 Left Third Toe Amp Site (Active)   Wound Image   07/20/21 1434   Wound Etiology Non-Healing Surgical 07/20/21 1434   Dressing Status New dressing applied;Clean;Dry; Intact 07/20/21 1606   Wound Cleansed Cleansed with saline; Vashe 07/27/21 1424   Offloading for Diabetic Foot Ulcers No offloading required 07/27/21 1424   Wound Length (cm) 1 cm 07/27/21 1424   Wound Width (cm) 0.4 cm 07/27/21 1424   Wound Depth (cm) 0.3 cm 07/27/21 1424   Wound Surface Area (cm^2) 0.4 cm^2 07/27/21 1424   Change in Wound Size % (l*w) 55.56 07/27/21 1424   Wound Volume (cm^3) 0.12 cm^3 07/27/21 1424   Wound Healing % 67 07/27/21 1424   Post-Procedure Length (cm) 1 cm 07/27/21 1431   Post-Procedure Width (cm) 0.4 cm 07/27/21 1431   Post-Procedure Depth (cm) 0.3 cm 07/27/21 1431   Post-Procedure Surface Area (cm^2) 0.4 cm^2 07/27/21 1431   Post-Procedure Volume (cm^3) 0.12 cm^3 07/27/21 1431   Distance Tunneling (cm) 0 cm 07/27/21 1424   Tunneling Position ___ O'Clock 0 07/27/21 1424   Undermining Starts ___ O'Clock 0 07/27/21 1424   Undermining Ends___ O'Clock 0 07/27/21 1424   Undermining Maxium Distance (cm) 0 07/27/21 1424   Wound Assessment Granulation tissue;Pink/red 07/27/21 1424   Drainage Amount Moderate 07/27/21 1424   Drainage Description Serosanguinous 07/27/21 1424   Odor None 07/27/21 1424   Rosalie-wound Assessment Hyperkeratosis (callous); Maceration 07/27/21 1424   Margins Defined edges 07/27/21 1424   Wound Thickness Description not for Pressure Injury Full thickness 07/27/21 1424   Number of days: 50       Percent of Wound(s) Debrided: approximately 100%    Total  Area  Debrided:  0.4 sq cm     Bleeding:  Minimal    Hemostasis Achieved:  by pressure    Procedural Pain:  0  / 10     Post Procedural Pain:  0 / 10     Response to treatment:  Well tolerated by patient. Status of wound progress and description from last visit: Improved, smaller, no S&S infetion. Small area of dehiscence mid plantar incision, collagen placed within the wound and steri strips applied. Plan:       Discharge Instructions       PHYSICIAN ORDERS AND DISCHARGE INSTRUCTIONS     NOTE: Upon discharge from the [address], you will receive a patient experience survey.  We would be grateful if you would take the time to fill this survey out.     Wound care order history:                 YADY's   Right       Left               Date:              Cultures:                Grafts:                Antibiotics:           Continuing wound care orders and information:                 Residence:  Private              Continue home health care with: PICC LINE ONLY                Your wound-care supplies will be provided by:      Wound cleansing:               TW not scrub or use excessive force.              Wash hands with

## 2021-08-03 ENCOUNTER — HOSPITAL ENCOUNTER (OUTPATIENT)
Dept: WOUND CARE | Age: 41
Discharge: HOME | End: 2021-08-03
Payer: COMMERCIAL

## 2021-08-03 VITALS
RESPIRATION RATE: 18 BRPM | SYSTOLIC BLOOD PRESSURE: 171 MMHG | TEMPERATURE: 97.4 F | DIASTOLIC BLOOD PRESSURE: 94 MMHG | HEART RATE: 70 BPM

## 2021-08-03 DIAGNOSIS — E11.621 DIABETIC ULCER OF LEFT MIDFOOT ASSOCIATED WITH TYPE 2 DIABETES MELLITUS, WITH NECROSIS OF MUSCLE (HCC): Primary | ICD-10-CM

## 2021-08-03 DIAGNOSIS — S98.132A AMPUTATION OF TOE OF LEFT FOOT (HCC): ICD-10-CM

## 2021-08-03 DIAGNOSIS — L97.423 DIABETIC ULCER OF LEFT MIDFOOT ASSOCIATED WITH TYPE 2 DIABETES MELLITUS, WITH NECROSIS OF MUSCLE (HCC): Primary | ICD-10-CM

## 2021-08-03 PROCEDURE — 11042 DBRDMT SUBQ TIS 1ST 20SQCM/<: CPT

## 2021-08-03 PROCEDURE — 11042 DBRDMT SUBQ TIS 1ST 20SQCM/<: CPT | Performed by: NURSE PRACTITIONER

## 2021-08-03 RX ORDER — BACITRACIN ZINC AND POLYMYXIN B SULFATE 500; 1000 [USP'U]/G; [USP'U]/G
OINTMENT TOPICAL ONCE
Status: CANCELLED | OUTPATIENT
Start: 2021-08-03 | End: 2021-08-03

## 2021-08-03 RX ORDER — LIDOCAINE HYDROCHLORIDE 20 MG/ML
JELLY TOPICAL ONCE
Status: CANCELLED | OUTPATIENT
Start: 2021-08-03 | End: 2021-08-03

## 2021-08-03 RX ORDER — BETAMETHASONE DIPROPIONATE 0.05 %
OINTMENT (GRAM) TOPICAL ONCE
Status: CANCELLED | OUTPATIENT
Start: 2021-08-03 | End: 2021-08-03

## 2021-08-03 RX ORDER — LIDOCAINE 50 MG/G
OINTMENT TOPICAL ONCE
Status: CANCELLED | OUTPATIENT
Start: 2021-08-03 | End: 2021-08-03

## 2021-08-03 RX ORDER — LIDOCAINE 50 MG/G
OINTMENT TOPICAL ONCE
Status: DISCONTINUED | OUTPATIENT
Start: 2021-08-03 | End: 2021-08-04 | Stop reason: HOSPADM

## 2021-08-03 RX ORDER — LIDOCAINE HYDROCHLORIDE 40 MG/ML
SOLUTION TOPICAL ONCE
Status: CANCELLED | OUTPATIENT
Start: 2021-08-03 | End: 2021-08-03

## 2021-08-03 RX ORDER — GENTAMICIN SULFATE 1 MG/G
OINTMENT TOPICAL ONCE
Status: CANCELLED | OUTPATIENT
Start: 2021-08-03 | End: 2021-08-03

## 2021-08-03 RX ORDER — CLOBETASOL PROPIONATE 0.5 MG/G
OINTMENT TOPICAL
Qty: 30 G | Refills: 0 | Status: SHIPPED | OUTPATIENT
Start: 2021-08-03 | End: 2022-01-24

## 2021-08-03 RX ORDER — LIDOCAINE 40 MG/G
CREAM TOPICAL ONCE
Status: CANCELLED | OUTPATIENT
Start: 2021-08-03 | End: 2021-08-03

## 2021-08-03 RX ORDER — CLOBETASOL PROPIONATE 0.5 MG/G
OINTMENT TOPICAL ONCE
Status: CANCELLED | OUTPATIENT
Start: 2021-08-03 | End: 2021-08-03

## 2021-08-03 RX ORDER — GINSENG 100 MG
CAPSULE ORAL ONCE
Status: CANCELLED | OUTPATIENT
Start: 2021-08-03 | End: 2021-08-03

## 2021-08-03 RX ORDER — BACITRACIN, NEOMYCIN, POLYMYXIN B 400; 3.5; 5 [USP'U]/G; MG/G; [USP'U]/G
OINTMENT TOPICAL ONCE
Status: CANCELLED | OUTPATIENT
Start: 2021-08-03 | End: 2021-08-03

## 2021-08-03 ASSESSMENT — PAIN SCALES - GENERAL: PAINLEVEL_OUTOF10: 0

## 2021-08-03 NOTE — PROGRESS NOTES
[facility]    DENTAL SURGERY      teeth extractions -half per patient    HERNIA REPAIR Bilateral 5/27/2020    BIALTERAL HERNIA INGUINAL REPAIR performed by Lucie Kennedy MD at [facility]  2018    [place] OFFICE/OUTPT VISIT,PROCEDURE ONLY Left 4/17/2018    L5-S1 HEMILAMINECTOMY, REMOVAL OF DISC LEFT SIDE performed by Mk Lombardo MD at [address] Right 1/8/2021    RIGHT TRANSMETATARSAL TOE AMPUTATION performed by Rachid Herrera DPM at [address] [facility] EXTRACTION         FAMILY HISTORY    Family History   Problem Relation Age of Onset    Heart Disease Father     Diabetes Father     Diabetes Mother     Heart Disease Mother     Other Mother     Kidney Disease Mother     Heart Attack Mother        SOCIAL HISTORY    Social History     Tobacco Use    Smoking status: Never Smoker    Smokeless tobacco: Never Used   Vaping Use    Vaping Use: Never used   Substance Use Topics    Alcohol use: Yes     Comment: \"couple beers a night\"    Drug use: No       ALLERGIES    No Known Allergies    MEDICATIONS    Current Outpatient Medications on File Prior to Encounter   Medication Sig Dispense Refill    guaiFENesin (MUCINEX) 600 MG extended release tablet Take 1 tablet by mouth 2 times daily 20 tablet 0    benzonatate (TESSALON PERLES) 100 MG capsule Take 1 capsule by mouth 3 times daily as needed for Cough 20 capsule 0    ondansetron (ZOFRAN-ODT) 4 MG disintegrating tablet Take 1 tablet by mouth every 8 hours as needed for Nausea or Vomiting 15 tablet 0    metFORMIN (GLUCOPHAGE) 500 MG tablet Take 2 tablets by mouth 2 times daily (with meals) Indications: Start tomorrow 03/14 360 tablet 1    FLUoxetine (PROZAC) 40 MG capsule Take 1 capsule by mouth daily 30 capsule 5    lisinopril (PRINIVIL;ZESTRIL) 10 MG tablet Take 1 tablet by mouth daily 30 tablet 5    metoprolol succinate (TOPROL XL) 25 MG extended release tablet Take 1 tablet by mouth daily 30 tablet 5 with type 2 diabetes mellitus, with necrosis of muscle (HCC) - Primary    Relevant Medications    lidocaine (XYLOCAINE) 5 % ointment (Start on 8/3/2021  3:00 PM)    Other Relevant Orders    Initiate Outpatient Wound Care Protocol    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 ([facility] [address]        Procedure Note    Indications:  Based on my examination of this patient's wound(s) today, sharp excision into necrotic subcutaneous tissue is required to promote healing and evaluate the extent of previous healing. Performed by: RAFAEL Sales - CNP    Consent obtained: Yes    Time out taken:  Yes    Pain Control: Anesthetic  Anesthetic: 5% Lidocaine Ointment Topical     Debridement:Excisional Debridement    Using curette the wound(s) was/were sharply debrided down through and including the removal of subcutaneous tissue. Devitalized Tissue Debrided:  slough and exudate    Pre Debridement Measurements:  Are located in the Wound Documentation Flow Sheet    All active wounds listed below with today's date are evaluated  Wound(s)    debrided this date include # : 1     Post  Debridement Measurements:  Wound 06/07/21 Toe (Comment  which one) Left;Plantar #1 Left Third Toe Amp Site (Active)   Wound Image   08/03/21 1426   Wound Etiology Non-Healing Surgical 08/03/21 1426   Dressing Status New dressing applied;Clean;Dry; Intact 07/20/21 1606   Wound Cleansed Wound cleanser 08/03/21 1426   Offloading for Diabetic Foot Ulcers No offloading required 07/27/21 1424   Wound Length (cm) 0.5 cm 08/03/21 1426   Wound Width (cm) 0.2 cm 08/03/21 1426   Wound Depth (cm) 0.1 cm 08/03/21 1426   Wound Surface Area (cm^2) 0.1 cm^2 08/03/21 1426   Change in Wound Size % (l*w) 88.89 08/03/21 1426   Wound Volume (cm^3) 0.01 cm^3 08/03/21 1426   Wound Healing % 97 08/03/21 1426   Post-Procedure Length (cm) 1 cm 07/27/21 1431   Post-Procedure Width (cm) 0.4 cm 07/27/21 1431   Post-Procedure Depth (cm) 0.3 cm 07/27/21 1431   Post-Procedure Surface Area (cm^2) 0.4 cm^2 07/27/21 1431   Post-Procedure Volume (cm^3) 0.12 cm^3 07/27/21 1431   Distance Tunneling (cm) 0 cm 08/03/21 1426   Tunneling Position ___ O'Clock 0 08/03/21 1426   Undermining Starts ___ O'Clock 0 08/03/21 1426   Undermining Ends___ O'Clock 0 08/03/21 1426   Undermining Maxium Distance (cm) 0 08/03/21 1426   Wound Assessment Granulation tissue 08/03/21 1426   Drainage Amount Scant 08/03/21 1426   Drainage Description Serosanguinous 08/03/21 1426   Odor None 08/03/21 1426   Rosalie-wound Assessment Hyperkeratosis (callous); Maceration 08/03/21 1426   Margins Defined edges 08/03/21 1426   Wound Thickness Description not for Pressure Injury Full thickness 08/03/21 1426   Number of days: 57       Percent of Wound(s) Debrided: approximately 100%    Total  Area  Debrided:  0.1 sq cm     Bleeding:  Minimal    Hemostasis Achieved:  by pressure    Procedural Pain:  0  / 10     Post Procedural Pain:  0 / 10     Response to treatment:  Well tolerated by patient. Status of wound progress and description from last visit: Healing nicely, almost healed, no S&S infetion. Small area of dehiscence mid plantar incision, collagen placed within the wound and steri strips applied. IV antibiotics completed, PICC removed. He has an area of erythema wear the adhesive dressing was at the PICC site. Ordered Clobetasol for this area. Patient given the go to start walking up to 4 hours daily, keep wound dry. Follow up next week will likely be healed. Plan:       Discharge Instructions         Discharge Instructions        PHYSICIAN ORDERS AND DISCHARGE INSTRUCTIONS     NOTE: Upon discharge from the [address], you will receive a patient experience survey.  We would be grateful if you would take the time to fill this survey out.     Wound care order history:                 YADY's   Right       Left               Date:              Cultures:                Grafts:                Antibiotics:           Continuing wound care orders and information:                 Residence:  Private              Continue home health care with: PICC LINE ONLY                Your wound-care supplies will be provided by:      Wound cleansing:               IT not scrub or use excessive force.              Wash hands with soap and water before and after dressing changes.             VZXXY to applying a clean dressing, cleanse wound with normal saline, wound cleanser, or mild soap and water.               Ask the physician or nurse before getting the wound(s) wet in a shower     Daily Wound management:              Keep weight off wounds and reposition every 2 hours.              Avoid standing for long periods of time.              Apply wraps/stockings in AM and remove at bedtime.              If swelling is present, elevate legs to the level of the heart or above for 30 minutes 4-5 times a day and/or when sitting.                                      When taking antibiotics take entire prescription as ordered by physician do not stop taking until medicine is all gone.                                                           Orders for this week:  08/3/21                  Left 3rd toe and Plantar Foot amputation site--  Spray with anasept wound , then: pack with ariadna and cover with steri strips(leave in place until next visit)   and Cover with 4x4  gauze. Wrap with conform and secure with tape. Change daily as needed. Right arm apply Clobetasol to area of redness. Apply daily     Give Forefoot Offloader 6/7/21  X-ray ordered 6/7/21  Referral to Dr Isabelle Tejada 6/7/21     Follow up with Munira LYONS in 1 week at the wound care center  Call [phone] for any questions or concerns.   Date__________   Time____________          Treatment Note      Written Patient Dismissal Instructions Given            Electronically signed by RAFAEL Sinclair CNP on 8/3/2021 at 2:55 PM

## 2021-08-09 ENCOUNTER — OFFICE VISIT (OUTPATIENT)
Dept: FAMILY MEDICINE CLINIC | Age: 41
End: 2021-08-09
Payer: COMMERCIAL

## 2021-08-09 ENCOUNTER — HOSPITAL ENCOUNTER (OUTPATIENT)
Dept: ULTRASOUND IMAGING | Age: 41
Discharge: HOME | End: 2021-08-09
Payer: COMMERCIAL

## 2021-08-09 VITALS
WEIGHT: 172 LBS | SYSTOLIC BLOOD PRESSURE: 122 MMHG | RESPIRATION RATE: 16 BRPM | BODY MASS INDEX: 23.3 KG/M2 | DIASTOLIC BLOOD PRESSURE: 75 MMHG | HEART RATE: 67 BPM | OXYGEN SATURATION: 97 % | HEIGHT: 72 IN

## 2021-08-09 DIAGNOSIS — M79.89 SWELLING OF RIGHT UPPER EXTREMITY: ICD-10-CM

## 2021-08-09 DIAGNOSIS — M79.89 SWELLING OF RIGHT UPPER EXTREMITY: Primary | ICD-10-CM

## 2021-08-09 PROCEDURE — 93971 EXTREMITY STUDY: CPT

## 2021-08-09 PROCEDURE — 99213 OFFICE O/P EST LOW 20 MIN: CPT | Performed by: STUDENT IN AN ORGANIZED HEALTH CARE EDUCATION/TRAINING PROGRAM

## 2021-08-09 RX ORDER — FLUOXETINE HYDROCHLORIDE 40 MG/1
40 CAPSULE ORAL DAILY
COMMUNITY
Start: 2021-04-07 | End: 2022-01-24 | Stop reason: ALTCHOICE

## 2021-08-09 ASSESSMENT — ENCOUNTER SYMPTOMS
ABDOMINAL PAIN: 0
BACK PAIN: 0
SHORTNESS OF BREATH: 0
WHEEZING: 0
SORE THROAT: 0
NAUSEA: 0

## 2021-08-09 NOTE — PROGRESS NOTES
8/18/2021    Willian Diaz    Chief Complaint   Patient presents with    Other     Had pic llne removed about two weeks ago. Redness around previous pic site. Clobetasol did not help.  Other     US scheduled Stat at [facility], 08/09/2021 @5:00 with a 4:30 pm arrival time. I gave Dr. Carol Lemons cell number to scheduling for result. HPI  History was obtained from patient. Patrick Beard is a 36 y.o. male with a PMHx as listed below who presents today for swelling left arm. Patient had PICC line abx therapy for 6 weeks. Since PICC line placement patient has had swelling, redness around PICC line site. PICC removed 2 weeks ago and swelling persists. Follows with [facility]. Completed course of vancomycin and cefepime. 6 weeks    No trauma to area, recent surgeries  No hx. Of DVT blood clots in the past      1. Swelling of right upper extremity             REVIEW OF SYMPTOMS    Review of Systems   Constitutional: Negative for chills and fatigue. HENT: Negative for congestion and sore throat. Respiratory: Negative for shortness of breath and wheezing. Cardiovascular: Negative for chest pain and palpitations. Gastrointestinal: Negative for abdominal pain and nausea. Genitourinary: Negative for frequency and urgency. Musculoskeletal: Negative for arthralgias and back pain. Skin:        +swelling right forearm   Neurological: Negative for light-headedness.        PAST MEDICAL HISTORY  Past Medical History:   Diagnosis Date    Callus of foot     Right foot - see's Dr. Trace Tee Diabetic ulcer of left midfoot associated with type 2 diabetes mellitus, with necrosis of muscle ([address] 6/7/2021    Diabetic ulcer of right midfoot associated with type 2 diabetes mellitus, with fat layer exposed ([address] 8/11/2020    Dizziness     positional    Essential hypertension     Follows with PCP    Hyperlipidemia     Lumbar radiculopathy     Septic embolism ([address] 6/13/2020       FAMILY HISTORY  Family History   Problem Relation Age of Onset    Heart Disease Father     Diabetes Father     Diabetes Mother     Heart Disease Mother     Other Mother     Kidney Disease Mother     Heart Attack Mother        SOCIAL HISTORY  Social History     Socioeconomic History    Marital status: Single     Spouse name: None    Number of children: None    Years of education: None    Highest education level: None   Occupational History    None   Tobacco Use    Smoking status: Never Smoker    Smokeless tobacco: Never Used   Vaping Use    Vaping Use: Never used   Substance and Sexual Activity    Alcohol use: Yes     Comment: \"couple beers a night\"    Drug use: No    Sexual activity: Yes     Partners: Female   Other Topics Concern    None   Social History Narrative    None     Social Determinants of Health     Financial Resource Strain:     Difficulty of Paying Living Expenses:    Food Insecurity:     Worried About Running Out of Food in the Last Year:     Ran Out of Food in the Last Year:    Transportation Needs:     Lack of Transportation (Medical):      Lack of Transportation (Non-Medical):    Physical Activity:     Days of Exercise per Week:     Minutes of Exercise per Session:    Stress:     Feeling of Stress :    Social Connections:     Frequency of Communication with Friends and Family:     Frequency of Social Gatherings with Friends and Family:     Attends Baptism Services:     Active Member of Clubs or Organizations:     Attends Club or Organization Meetings:     Marital Status:    Intimate Partner Violence:     Fear of Current or Ex-Partner:     Emotionally Abused:     Physically Abused:     Sexually Abused:         SURGICAL HISTORY  Past Surgical History:   Procedure Laterality Date    ACHILLES TENDON SURGERY Right 1/8/2021    RIGHT ACHILLES TENDON LENGTHENING REPAIR performed by Dagoberto Perkins DPM at [address] [place] [address]  03/2018    Orovadasean Hughes    DENTAL SURGERY      teeth extractions -half per patient    HERNIA REPAIR Bilateral 5/27/2020    BIALTERAL HERNIA INGUINAL REPAIR performed by Maria Del Carmen Trujillo MD at [address] [place] [address]  2018    [place] OFFICE/OUTPT VISIT,PROCEDURE ONLY Left 4/17/2018    L5-S1 HEMILAMINECTOMY, REMOVAL OF DISC LEFT SIDE performed by David Steve MD at [place] [address] Right 1/8/2021    RIGHT TRANSMETATARSAL TOE AMPUTATION performed by Eder Mckenzie DPM at [address] [facility]                   CURRENT MEDICATIONS  Current Outpatient Medications   Medication Sig Dispense Refill    FLUoxetine (PROZAC) 40 MG capsule Take 40 mg by mouth daily      clobetasol (TEMOVATE) 0.05 % ointment Apply topically 2 times daily. 30 g 0    ondansetron (ZOFRAN-ODT) 4 MG disintegrating tablet Take 1 tablet by mouth every 8 hours as needed for Nausea or Vomiting 15 tablet 0    metFORMIN (GLUCOPHAGE) 500 MG tablet Take 2 tablets by mouth 2 times daily (with meals) Indications: Start tomorrow 03/14 360 tablet 1    lisinopril (PRINIVIL;ZESTRIL) 10 MG tablet Take 1 tablet by mouth daily 30 tablet 5    metoprolol succinate (TOPROL XL) 25 MG extended release tablet Take 1 tablet by mouth daily 30 tablet 5    atorvastatin (LIPITOR) 40 MG tablet Take 1 tablet by mouth nightly 30 tablet 3    pioglitazone (ACTOS) 15 MG tablet Take 1 tablet by mouth daily (Patient taking differently: Take 15 mg by mouth daily Pt states PRN) 30 tablet 5    blood glucose monitor kit and supplies Dispense sufficient amount for indicated testing frequency plus additional to accommodate PRN testing needs. Dispense all needed supplies to include: monitor, strips, lancing device, lancets, control solutions, alcohol swabs. 1 kit 0    blood glucose monitor strips Test 2 times a day & as needed for symptoms of irregular blood glucose. Dispense sufficient amount for indicated testing frequency plus additional to accommodate PRN testing needs.  100 strip 0    FreeStyle Lancets MISC 1 each by Does not apply route daily 100 each 3    aspirin 81 MG tablet Take 81 mg by mouth daily      acetaminophen (TYLENOL) 325 MG tablet Take 2 tablets by mouth every 4 hours as needed for Pain or Fever 120 tablet 3    omeprazole (PRILOSEC) 20 MG delayed release capsule Take 1 capsule by mouth once daily in the morning 90 capsule 0    glyBURIDE (DIABETA) 5 MG tablet Take 2 tablets by mouth 2 times daily (with meals) 360 tablet 1     No current facility-administered medications for this visit. ALLERGIES  No Known Allergies    PHYSICAL EXAM    /75   Pulse 67   Resp 16   Ht 6' (1.829 m)   Wt 172 lb (78 kg)   SpO2 97%   BMI 23.33 kg/m²     Physical Exam  Constitutional:       Appearance: Normal appearance. HENT:      Head: Normocephalic and atraumatic. Eyes:      Extraocular Movements: Extraocular movements intact. Pupils: Pupils are equal, round, and reactive to light. Cardiovascular:      Rate and Rhythm: Normal rate and regular rhythm. Pulses: Normal pulses. Radial pulses are 2+ on the right side and 2+ on the left side. Heart sounds: No murmur heard. No friction rub. No gallop. Pulmonary:      Effort: Pulmonary effort is normal. No respiratory distress. Breath sounds: Normal breath sounds. Musculoskeletal:      Cervical back: Neck supple. Comments: Left foot dressing   Skin:     General: Skin is warm and dry. Neurological:      General: No focal deficit present. Mental Status: He is alert. Psychiatric:         Mood and Affect: Mood normal.         Behavior: Behavior normal.         ASSESSMENT & PLAN    1. Swelling of right upper extremity  -U/S reviewed superficial venous thrombosis right cephalic vein  -continue 77HEB discussed use of ice and heat therapy to help reduce      Return in about 2 months (around 10/9/2021).          Electronically signed by Macie Ivan DO on 8/18/2021

## 2021-08-10 ENCOUNTER — TELEPHONE (OUTPATIENT)
Dept: FAMILY MEDICINE CLINIC | Age: 41
End: 2021-08-10

## 2021-08-10 ENCOUNTER — HOSPITAL ENCOUNTER (OUTPATIENT)
Dept: WOUND CARE | Age: 41
Discharge: HOME | End: 2021-08-10

## 2021-08-10 NOTE — TELEPHONE ENCOUNTER
Spoke with patient 1 day ago regarding superficial DVT. Discussed warm compress, continue asa81. If not improving in 1-2 weeks call back office.  Patient understood

## 2021-08-10 NOTE — DISCHARGE INSTR - COC
Continuity of Care Form    Patient Name: Amna Snow   :  1980  MRN:  [id number]    Admit date:  (Not on file)  Discharge date:  ***    Code Status Order: Prior   Advance Directives:     Admitting Physician:  No admitting provider for patient encounter. PCP: Aminata Sellers MD    Discharging Nurse: [facility] Unit/Room#: No information available for this encounter.   Discharging Unit Phone Number: ***    Emergency Contact:   Extended Emergency Contact Information  Primary Emergency Contact: Kriss Howard  Address: [address] Phone: [phone]  Work Phone: [phone]  Mobile Phone: [phone]  Relation: Parent    Past Surgical History:  Past Surgical History:   Procedure Laterality Date    ACHILLES TENDON SURGERY Right 2021    RIGHT ACHILLES TENDON LENGTHENING REPAIR performed by Lula Castanon DPM at [address]  2018    [facility]    DENTAL SURGERY      teeth extractions -half per patient    HERNIA REPAIR Bilateral 2020    [address] [place] HERNIA INGUINAL REPAIR performed by Tati Collado MD at [facility]      [facility]EDURE ONLY Left 2018    L5-S1 HEMILAMINECTOMY, REMOVAL OF DISC LEFT SIDE performed by David Traylor MD at [address] Right 2021    RIGHT TRANSMETATARSAL TOE AMPUTATION performed by Lula Castanon DPM [facility] [address] [facility] [place] EXTRACTION         Immunization History:   Immunization History   Administered Date(s) Administered    Influenza A ([id number]) Vaccine PF IM 10/28/2009    Influenza Virus Vaccine 10/12/2018    Influenza, [place], IM, PF (6 mo and older Fluzone, Flulaval, Fluarix, and 3 yrs and older Afluria) 10/23/2019, 2020    Tdap (Boostrix, Adacel) 2016       Active Problems:  Patient Active Problem List   Diagnosis Code    Unstable angina ([facility] I20.0    Lumbar back pain with radiculopathy affecting left lower extremity M54.16    S/P lumbar laminectomy Z98.890    Type 2 diabetes mellitus with diabetic neuropathy, without long-term current use of insulin ([facility]) E11.40    Essential hypertension I10    Gastroesophageal reflux disease without esophagitis K21.9    WD-Cellulitis of foot right L03.119    Chest pain R07.9    Diabetic ulcer of left midfoot associated with type 2 diabetes mellitus, with necrosis of muscle ([facility]) E11.621, L97.423    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 ([facility] [address] T85.580D       Isolation/Infection:   Isolation          No Isolation        Patient Infection Status     Infection Onset Added Last Indicated Last Indicated By Review Planned Expiration Resolved Resolved By    None active    Resolved    COVID-19 Rule Out 06/12/21 06/12/21 06/12/21 COVID-19, Rapid (Ordered)   06/12/21 Rule-Out Test Resulted    COVID-19 Rule Out 06/09/21 06/09/21 06/09/21 COVID-19 (Ordered)   06/10/21 Rule-Out Test Resulted    COVID-19 Rule Out 03/12/21 03/12/21 03/12/21 COVID-19, Rapid (Ordered)   03/12/21 Rule-Out Test Resulted    COVID-19 Rule Out 12/24/20 12/24/20 12/24/20 Covid-19 Ambulatory (Ordered)   12/25/20 Rule-Out Test Resulted    COVID-19 Rule Out 09/01/20 09/01/20 09/01/20 Covid-19 Ambulatory (Ordered)   09/02/20 Rule-Out Test Resulted    COVID-19 Rule Out 08/24/20 08/24/20 08/24/20 Covid-19 Ambulatory (Ordered)   08/25/20 Rule-Out Test Resulted    COVID-19 Rule Out 06/13/20 06/13/20 06/13/20 Covid-19 Ambulatory (Ordered)   06/15/20 Rule-Out Test Resulted    COVID-19 Rule Out 05/23/20 05/23/20 05/23/20 Covid-19 Ambulatory (Ordered)   05/24/20 Rule-Out Test Resulted          Nurse Assessment:  Last Vital Signs: There were no vitals taken for this visit.     Last documented pain score (0-10 scale):    Last Weight:   Wt Readings from Last 1 Encounters:   08/09/21 172 lb (78 kg)     Mental Status:  {IP PT MENTAL STATUS:20030}    IV Access:  [address] [facility] YIUBDR:034480537}    Nursing Mobility/ADLs:  Walking   {CHP DME VSVV:570976078}  Transfer  {CHP DME ZOXX:015317314}  Bathing  {CHP DME YWDL:055591026}  Dressing  {CHP DME WAYP:600670186}  Toileting  {CHP DME NQVR:785110325}  Feeding  {CHP DME THBS:144638314}  Med Admin  {CHP DME RCLF:466813536}  Med Delivery   {[facility] MED Delivery:743087832}    Wound Care Documentation and Therapy:  Incision 04/17/18 Back (Active)   Number of days: 2215       Wound 06/13/20 Tibial Right pt states reddened and rash like after a sunburn 6/11/2020 (Active)   Number of days: 201       Wound 06/07/21 Toe (Comment  which one) Left;Plantar #1 Left Third Toe Amp Site (Active)   Wound Image   08/03/21 1426   Wound Etiology Non-Healing Surgical 08/03/21 1426   Dressing Status New dressing applied;Clean;Dry; Intact 07/20/21 1606   Wound Cleansed Wound cleanser 08/03/21 1426   Offloading for Diabetic Foot Ulcers No offloading required 07/27/21 1424   Wound Length (cm) 0.5 cm 08/03/21 1426   Wound Width (cm) 0.2 cm 08/03/21 1426   Wound Depth (cm) 0.1 cm 08/03/21 1426   Wound Surface Area (cm^2) 0.1 cm^2 08/03/21 1426   Change in Wound Size % (l*w) 88.89 08/03/21 1426   Wound Volume (cm^3) 0.01 cm^3 08/03/21 1426   Wound Healing % 97 08/03/21 1426   Post-Procedure Length (cm) 1 cm 07/27/21 1431   Post-Procedure Width (cm) 0.4 cm 07/27/21 1431   Post-Procedure Depth (cm) 0.3 cm 07/27/21 1431   Post-Procedure Surface Area (cm^2) 0.4 cm^2 07/27/21 1431   Post-Procedure Volume (cm^3) 0.12 cm^3 07/27/21 1431   Distance Tunneling (cm) 0 cm 08/03/21 1426   Tunneling Position ___ O'Clock 0 08/03/21 1426   Undermining Starts ___ O'Clock 0 08/03/21 1426   Undermining Ends___ O'Clock 0 08/03/21 1426   Undermining Maxium Distance (cm) 0 08/03/21 1426   Wound Assessment Granulation tissue 08/03/21 1426   Drainage Amount Scant 08/03/21 1426   Drainage Description Serosanguinous 08/03/21 1426   Odor None 08/03/21 1426   Rosalie-wound Assessment Hyperkeratosis (callous); Maceration 21 1426   Margins Defined edges 21 1426   Wound Thickness Description not for Pressure Injury Full thickness 21 1426   Number of days: 64        Elimination:  Continence:   · Bowel: {YES / RY:40569}  · Bladder: {YES / SN:90234}  Urinary Catheter: {Urinary Catheter:719283191}   Colostomy/Ileostomy/Ileal Conduit: {YES / EL:78883}       Date of Last BM: ***  No intake or output data in the 24 hours ending 08/10/21 1315  No intake/output data recorded.     Safety Concerns:     [address] Safety Concerns:965591203}    Impairments/Disabilities:      [address] Impairments/Disabilities:432448505}    Nutrition Therapy:  Current Nutrition Therapy:   [address] Diet List:234832823}    Routes of Feeding: {CHP DME Other Feedings:093020687}  Liquids: {Slp liquid thickness:20432}  Daily Fluid Restriction: {CHP DME Yes amt example:033986436}  Last Modified Barium Swallow with Video (Video Swallowing Test): {Done Not Done SZUP:745276163}    Treatments at the Time of Hospital Discharge:   Respiratory Treatments: ***  Oxygen Therapy:  {Therapy; copd oxygen:55395}  Ventilator:    {[facility] Vent FUBO:054340124}    Rehab Therapies: {THERAPEUTIC INTERVENTION:2043574107}  Weight Bearing Status/Restrictions: [address] [email or web address]  Weight Bearin}  Other Medical Equipment (for information only, NOT a DME order):  {EQUIPMENT:593813861}  Other Treatments: ***    Patient's personal belongings (please select all that are sent with patient):  {CHP DME Belongings:951437163}    RN SIGNATURE:  {Esignature:621821775}    CASE MANAGEMENT/SOCIAL WORK SECTION    Inpatient Status Date: ***    Readmission Risk Assessment Score:  Readmission Risk              Risk of Unplanned Readmission:  0           Discharging to Facility/ Agency   · Name:   · Address:  · Phone:  · Fax:    Dialysis Facility (if applicable)   · Name:  · Address:  · Dialysis Schedule:  · Phone:  · Fax:    / signature: {Esignature:010438671}    PHYSICIAN SECTION    Prognosis: {Prognosis:6694782066}    Condition at Discharge: [address] Patient Condition:398116311}    Rehab Potential (if transferring to Rehab): {Prognosis:8162017806}    Recommended Labs or Other Treatments After Discharge: ***    Physician Certification: I certify the above information and transfer of Traci Cullen  is necessary for the continuing treatment of the diagnosis listed and that he requires {Admit to Appropriate Level of Care:04235} for {GREATER/LESS:270997146} 30 days.      Update Admission H&P: {CHP DME Changes in LMJDR:899126179}    PHYSICIAN SIGNATURE:  {Esignature:760575111}

## 2021-08-12 ENCOUNTER — HOSPITAL ENCOUNTER (OUTPATIENT)
Dept: WOUND CARE | Age: 41
Discharge: HOME | End: 2021-08-12
Payer: COMMERCIAL

## 2021-08-12 VITALS
TEMPERATURE: 97.6 F | DIASTOLIC BLOOD PRESSURE: 70 MMHG | RESPIRATION RATE: 17 BRPM | HEART RATE: 69 BPM | SYSTOLIC BLOOD PRESSURE: 128 MMHG

## 2021-08-12 DIAGNOSIS — L97.423 DIABETIC ULCER OF LEFT MIDFOOT ASSOCIATED WITH TYPE 2 DIABETES MELLITUS, WITH NECROSIS OF MUSCLE (HCC): ICD-10-CM

## 2021-08-12 DIAGNOSIS — E11.621 DIABETIC ULCER OF LEFT MIDFOOT ASSOCIATED WITH TYPE 2 DIABETES MELLITUS, WITH NECROSIS OF MUSCLE (HCC): ICD-10-CM

## 2021-08-12 DIAGNOSIS — S98.132A AMPUTATION OF TOE OF LEFT FOOT (HCC): Primary | ICD-10-CM

## 2021-08-12 PROCEDURE — 11719 TRIM NAIL(S) ANY NUMBER: CPT

## 2021-08-12 PROCEDURE — 99213 OFFICE O/P EST LOW 20 MIN: CPT | Performed by: NURSE PRACTITIONER

## 2021-08-12 PROCEDURE — 99212 OFFICE O/P EST SF 10 MIN: CPT

## 2021-08-12 ASSESSMENT — PAIN SCALES - GENERAL: PAINLEVEL_OUTOF10: 0

## 2021-08-12 ASSESSMENT — PAIN DESCRIPTION - LOCATION: LOCATION: FOOT

## 2021-08-12 ASSESSMENT — PAIN DESCRIPTION - ORIENTATION: ORIENTATION: LEFT

## 2021-08-12 NOTE — PROGRESS NOTES
Wound Care Center Progress Note       Riri Ferrera  AGE: 36 y.o. GENDER: male  : 1980  TODAY'S DATE:  2021        Subjective:     Chief Complaint   Patient presents with    Wound Check     LEFT FOOT          HISTORY of PRESENT ILLNESS    Janett Blue a 36 y.o. male who presents today for wound evaluation of Chronic diabetic and pressure ulcer(s) of left third toe and medial aspect of the left plantar surface.  The ulcer is now healed, no open areas. The underlying cause of the wound is diabetes and pressure. The patient had osteomyelitis left foot, he was admitted to [facility] -21, he had left foot OM of 3rd/4th metatarsal heads, 2-4 phalanges s/p partial left 3rd ray amputation on 6/15/21. He was sent home with a PIC and IV antibiotics Cefepime and Vancomycin through 21. He is to be non-weight bearing.     The patient is a diabetic on an oral regimen, last A1c was 7.8 on 21.  He is a nonsmoker and is not on anticoagulants beyond baby aspirin.     Wound Pain Timing/Severity: None  Quality of pain: N/A  Severity of pain:  0 / 10   Modifying Factors: diabetes, poor glucose control, chronic pressure and non-adherence  Associated Signs/Symptoms: drainage        PAST MEDICAL HISTORY        Diagnosis Date    Callus of foot     Right foot - see's Dr. Marcos Gunter Diabetic ulcer of left midfoot associated with type 2 diabetes mellitus, with necrosis of muscle ([address] 2021    Diabetic ulcer of right midfoot associated with type 2 diabetes mellitus, with fat layer exposed ([address] 2020    Dizziness     positional    Essential hypertension     Follows with PCP    Hyperlipidemia     Lumbar radiculopathy     Septic embolism ([address] 2020       PAST SURGICAL HISTORY    Past Surgical History:   Procedure Laterality Date    ACHILLES TENDON SURGERY Right 2021    RIGHT ACHILLES TENDON LENGTHENING REPAIR performed by Luisana Almendarez DPM at [place] [address] 03/2018    [facility]    DENTAL SURGERY      teeth extractions -half per patient    HERNIA REPAIR Bilateral 5/27/2020    BIALTERAL HERNIA INGUINAL REPAIR performed by Lucie Kennedy MD at [address] [place] [address]  2018    [place] OFFICE/OUTPT VISIT,PROCEDURE ONLY Left 4/17/2018    L5-S1 HEMILAMINECTOMY, REMOVAL OF DISC LEFT SIDE performed by Mk Lombardo MD at [facility] Right 1/8/2021    RIGHT TRANSMETATARSAL TOE AMPUTATION performed by Rachid Herrera DPM at [address] EXTRACTION         FAMILY HISTORY    Family History   Problem Relation Age of Onset    Heart Disease Father     Diabetes Father     Diabetes Mother     Heart Disease Mother     Other Mother     Kidney Disease Mother     Heart Attack Mother        SOCIAL HISTORY    Social History     Tobacco Use    Smoking status: Never Smoker    Smokeless tobacco: Never Used   Vaping Use    Vaping Use: Never used   Substance Use Topics    Alcohol use: Yes     Comment: \"couple beers a night\"    Drug use: No       ALLERGIES    No Known Allergies    MEDICATIONS    Current Outpatient Medications on File Prior to Encounter   Medication Sig Dispense Refill    FLUoxetine (PROZAC) 40 MG capsule Take 40 mg by mouth daily      clobetasol (TEMOVATE) 0.05 % ointment Apply topically 2 times daily.  30 g 0    ondansetron (ZOFRAN-ODT) 4 MG disintegrating tablet Take 1 tablet by mouth every 8 hours as needed for Nausea or Vomiting 15 tablet 0    metFORMIN (GLUCOPHAGE) 500 MG tablet Take 2 tablets by mouth 2 times daily (with meals) Indications: Start tomorrow 03/14 360 tablet 1    lisinopril (PRINIVIL;ZESTRIL) 10 MG tablet Take 1 tablet by mouth daily 30 tablet 5    metoprolol succinate (TOPROL XL) 25 MG extended release tablet Take 1 tablet by mouth daily 30 tablet 5    atorvastatin (LIPITOR) 40 MG tablet Take 1 tablet by mouth nightly 30 tablet 3    glyBURIDE (DIABETA) 5 MG tablet Take 2 tablets by mouth 2 times daily (with meals) 360 tablet 1    omeprazole (PRILOSEC) 20 MG delayed release capsule TAKE 1 CAPSULE BY MOUTH ONCE DAILY IN THE MORNING 90 capsule 1    pioglitazone (ACTOS) 15 MG tablet Take 1 tablet by mouth daily (Patient taking differently: Take 15 mg by mouth daily Pt states PRN) 30 tablet 5    blood glucose monitor kit and supplies Dispense sufficient amount for indicated testing frequency plus additional to accommodate PRN testing needs. Dispense all needed supplies to include: monitor, strips, lancing device, lancets, control solutions, alcohol swabs. 1 kit 0    blood glucose monitor strips Test 2 times a day & as needed for symptoms of irregular blood glucose. Dispense sufficient amount for indicated testing frequency plus additional to accommodate PRN testing needs. 100 strip 0    FreeStyle Lancets MISC 1 each by Does not apply route daily 100 each 3    aspirin 81 MG tablet Take 81 mg by mouth daily      acetaminophen (TYLENOL) 325 MG tablet Take 2 tablets by mouth every 4 hours as needed for Pain or Fever 120 tablet 3     No current facility-administered medications on file prior to encounter. REVIEW OF SYSTEMS    Pertinent items are noted in HPI. Constitutional: Negative for systemic symptoms including fever, chills and malaise. Objective:      /70   Pulse 69   Temp 97.6 °F (36.4 °C) (Temporal)   Resp 17     PHYSICAL EXAM    General: The patient is in no acute distress. Mental status:  Patient is appropriate, is  oriented to place and plan of care. Dermatologic exam: Visual inspection of the periwound reveals the skin to be normal in turgor and texture.   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                                      IAVR taking antibiotics take entire prescription as ordered by physician do not stop taking until medicine is all gone.                                                           Orders for this week:  08/12/21                  Left 3rd toe and Plantar Foot amputation site--  apply lotion         Give Forefoot Offloader 6/7/21  X-ray ordered 6/7/21  Referral to Dr Angi Bentley 6/7/21     Follow up with Munira LYONS in 2 week at the wound care center  Call [phone] for any questions or concerns.   Date__________   Time____________          Treatment Note      Written Patient Dismissal Instructions Given            Electronically signed by Ventura Carrel, APRN - CNP on 8/12/2021 at 3:08 PM

## 2021-08-15 DIAGNOSIS — K21.9 GASTROESOPHAGEAL REFLUX DISEASE WITHOUT ESOPHAGITIS: ICD-10-CM

## 2021-08-17 RX ORDER — OMEPRAZOLE 20 MG/1
CAPSULE, DELAYED RELEASE ORAL
Qty: 90 CAPSULE | Refills: 0 | Status: SHIPPED | OUTPATIENT
Start: 2021-08-17 | End: 2021-08-19

## 2021-08-18 DIAGNOSIS — K21.9 GASTROESOPHAGEAL REFLUX DISEASE WITHOUT ESOPHAGITIS: ICD-10-CM

## 2021-08-19 RX ORDER — OMEPRAZOLE 20 MG/1
CAPSULE, DELAYED RELEASE ORAL
Qty: 90 CAPSULE | Refills: 0 | Status: SHIPPED | OUTPATIENT
Start: 2021-08-19 | End: 2021-12-20 | Stop reason: SDUPTHER

## 2021-08-26 ENCOUNTER — HOSPITAL ENCOUNTER (OUTPATIENT)
Dept: WOUND CARE | Age: 41
Discharge: HOME | End: 2021-08-26
Payer: COMMERCIAL

## 2021-08-26 VITALS
RESPIRATION RATE: 18 BRPM | DIASTOLIC BLOOD PRESSURE: 87 MMHG | TEMPERATURE: 97.3 F | HEART RATE: 70 BPM | SYSTOLIC BLOOD PRESSURE: 127 MMHG

## 2021-08-26 DIAGNOSIS — L97.423 DIABETIC ULCER OF LEFT MIDFOOT ASSOCIATED WITH TYPE 2 DIABETES MELLITUS, WITH NECROSIS OF MUSCLE (HCC): Primary | ICD-10-CM

## 2021-08-26 DIAGNOSIS — E11.621 DIABETIC ULCER OF LEFT MIDFOOT ASSOCIATED WITH TYPE 2 DIABETES MELLITUS, WITH NECROSIS OF MUSCLE (HCC): Primary | ICD-10-CM

## 2021-08-26 DIAGNOSIS — S98.132A AMPUTATION OF TOE OF LEFT FOOT (HCC): ICD-10-CM

## 2021-08-26 PROCEDURE — 99213 OFFICE O/P EST LOW 20 MIN: CPT | Performed by: NURSE PRACTITIONER

## 2021-08-26 PROCEDURE — 99212 OFFICE O/P EST SF 10 MIN: CPT

## 2021-08-26 RX ORDER — GINSENG 100 MG
CAPSULE ORAL ONCE
Status: CANCELLED | OUTPATIENT
Start: 2021-08-26 | End: 2021-08-26

## 2021-08-26 RX ORDER — LIDOCAINE HYDROCHLORIDE 40 MG/ML
SOLUTION TOPICAL ONCE
Status: CANCELLED | OUTPATIENT
Start: 2021-08-26 | End: 2021-08-26

## 2021-08-26 RX ORDER — BETAMETHASONE DIPROPIONATE 0.05 %
OINTMENT (GRAM) TOPICAL ONCE
Status: CANCELLED | OUTPATIENT
Start: 2021-08-26 | End: 2021-08-26

## 2021-08-26 RX ORDER — LIDOCAINE HYDROCHLORIDE 20 MG/ML
JELLY TOPICAL ONCE
Status: CANCELLED | OUTPATIENT
Start: 2021-08-26 | End: 2021-08-26

## 2021-08-26 RX ORDER — CLOBETASOL PROPIONATE 0.5 MG/G
OINTMENT TOPICAL ONCE
Status: CANCELLED | OUTPATIENT
Start: 2021-08-26 | End: 2021-08-26

## 2021-08-26 RX ORDER — LIDOCAINE 50 MG/G
OINTMENT TOPICAL ONCE
Status: CANCELLED | OUTPATIENT
Start: 2021-08-26 | End: 2021-08-26

## 2021-08-26 RX ORDER — BACITRACIN, NEOMYCIN, POLYMYXIN B 400; 3.5; 5 [USP'U]/G; MG/G; [USP'U]/G
OINTMENT TOPICAL ONCE
Status: CANCELLED | OUTPATIENT
Start: 2021-08-26 | End: 2021-08-26

## 2021-08-26 RX ORDER — BACITRACIN ZINC AND POLYMYXIN B SULFATE 500; 1000 [USP'U]/G; [USP'U]/G
OINTMENT TOPICAL ONCE
Status: CANCELLED | OUTPATIENT
Start: 2021-08-26 | End: 2021-08-26

## 2021-08-26 RX ORDER — GENTAMICIN SULFATE 1 MG/G
OINTMENT TOPICAL ONCE
Status: CANCELLED | OUTPATIENT
Start: 2021-08-26 | End: 2021-08-26

## 2021-08-26 RX ORDER — LIDOCAINE 40 MG/G
CREAM TOPICAL ONCE
Status: CANCELLED | OUTPATIENT
Start: 2021-08-26 | End: 2021-08-26

## 2021-08-26 ASSESSMENT — PAIN SCALES - GENERAL: PAINLEVEL_OUTOF10: 0

## 2021-08-26 NOTE — PROGRESS NOTES
Wound Care Center Progress Note       Navjot Trujillo  AGE: 36 y.o. GENDER: male  : 1980  TODAY'S DATE:  2021        Subjective:     Chief Complaint   Patient presents with    Wound Check     left plantar          HISTORY of PRESENT ILLNESS    Yue watts 36 y.o. male who presents today for wound evaluation of Chronic diabetic and pressure ulcer(s) of left third toe and medial aspect of the left plantar surface.  The ulcer remains healed, no open areas. The underlying cause of the wound is diabetes and pressure. The patient had osteomyelitis left foot, he was admitted to [facility] -21, he had left foot OM of 3rd/4th metatarsal heads, 2-4 phalanges s/p partial left 3rd ray amputation on 6/15/21. He was sent home with a PIC and IV antibiotics Cefepime and Vancomycin through 21. He is to be non-weight bearing.     The patient is a diabetic on an oral regimen, last A1c was 7.8 on 21.  He is a nonsmoker and is not on anticoagulants beyond baby aspirin.     Wound Pain Timing/Severity: None  Quality of pain: N/A  Severity of pain:  0 / 10   Modifying Factors: diabetes, poor glucose control, chronic pressure and non-adherence  Associated Signs/Symptoms: none        PAST MEDICAL HISTORY        Diagnosis Date    Callus of foot     Right foot - see's Dr. Tamela Ward Diabetic ulcer of left midfoot associated with type 2 diabetes mellitus, with necrosis of muscle ([address] 2021    Diabetic ulcer of right midfoot associated with type 2 diabetes mellitus, with fat layer exposed ([address] 2020    Dizziness     positional    Essential hypertension     Follows with PCP    Hyperlipidemia     Lumbar radiculopathy     Septic embolism ([address] 2020       PAST SURGICAL HISTORY    Past Surgical History:   Procedure Laterality Date    ACHILLES TENDON SURGERY Right 2021    RIGHT ACHILLES TENDON LENGTHENING REPAIR performed by Sonam Carrera DPM at [facility] 03/2018    [facility]    DENTAL SURGERY      teeth extractions -half per patient    HERNIA REPAIR Bilateral 5/27/2020    BIALTERAL HERNIA INGUINAL REPAIR performed by Ricardo Garland MD at [facility] 2018    [place] OFFICE/OUTPT VISIT,PROCEDURE ONLY Left 4/17/2018    L5-S1 HEMILAMINECTOMY, REMOVAL OF DISC LEFT SIDE performed by Cori Danielson MD at [address] [place] [address] Drive Right 1/8/2021    RIGHT TRANSMETATARSAL TOE AMPUTATION performed by Sonam Carrera DPM at [facility] HISTORY    Family History   Problem Relation Age of Onset    Heart Disease Father     Diabetes Father     Diabetes Mother     Heart Disease Mother     Other Mother     Kidney Disease Mother     Heart Attack Mother        SOCIAL HISTORY    Social History     Tobacco Use    Smoking status: Never Smoker    Smokeless tobacco: Never Used   Vaping Use    Vaping Use: Never used   Substance Use Topics    Alcohol use: Yes     Comment: \"couple beers a night\"    Drug use: No       ALLERGIES    No Known Allergies    MEDICATIONS    Current Outpatient Medications on File Prior to Encounter   Medication Sig Dispense Refill    omeprazole (PRILOSEC) 20 MG delayed release capsule Take 1 capsule by mouth once daily in the morning 90 capsule 0    FLUoxetine (PROZAC) 40 MG capsule Take 40 mg by mouth daily      clobetasol (TEMOVATE) 0.05 % ointment Apply topically 2 times daily.  30 g 0    ondansetron (ZOFRAN-ODT) 4 MG disintegrating tablet Take 1 tablet by mouth every 8 hours as needed for Nausea or Vomiting 15 tablet 0    lisinopril (PRINIVIL;ZESTRIL) 10 MG tablet Take 1 tablet by mouth daily 30 tablet 5    metoprolol succinate (TOPROL XL) 25 MG extended release tablet Take 1 tablet by mouth daily 30 tablet 5    atorvastatin (LIPITOR) 40 MG tablet Take 1 tablet by mouth nightly 30 tablet 3    blood glucose monitor kit and supplies Dispense sufficient amount for indicated testing frequency plus additional to accommodate PRN testing needs. Dispense all needed supplies to include: monitor, strips, lancing device, lancets, control solutions, alcohol swabs. 1 kit 0    blood glucose monitor strips Test 2 times a day & as needed for symptoms of irregular blood glucose. Dispense sufficient amount for indicated testing frequency plus additional to accommodate PRN testing needs. 100 strip 0    FreeStyle Lancets MISC 1 each by Does not apply route daily 100 each 3    aspirin 81 MG tablet Take 81 mg by mouth daily      acetaminophen (TYLENOL) 325 MG tablet Take 2 tablets by mouth every 4 hours as needed for Pain or Fever 120 tablet 3    metFORMIN (GLUCOPHAGE) 500 MG tablet Take 2 tablets by mouth 2 times daily (with meals) Indications: Start tomorrow 03/14 360 tablet 1    glyBURIDE (DIABETA) 5 MG tablet Take 2 tablets by mouth 2 times daily (with meals) 360 tablet 1    pioglitazone (ACTOS) 15 MG tablet Take 1 tablet by mouth daily (Patient taking differently: Take 15 mg by mouth daily Pt states PRN) 30 tablet 5     No current facility-administered medications on file prior to encounter. REVIEW OF SYSTEMS    Pertinent items are noted in HPI. Constitutional: Negative for systemic symptoms including fever, chills and malaise. Objective:      /87   Pulse 70   Temp 97.3 °F (36.3 °C) (Temporal)   Resp 18     PHYSICAL EXAM    General: The patient is in no acute distress. Mental status:  Patient is appropriate, is  oriented to place and plan of care. Dermatologic exam: Visual inspection of the periwound reveals the skin to be dry and coarse.   Wound exam:  see wound description below     All active wounds listed below with today's date are evaluated    Assessment:     Problem List Items Addressed This Visit     Diabetic ulcer of left midfoot associated with type 2 diabetes mellitus, with necrosis of muscle ([facility] - Primary    Relevant Orders    Initiate Outpatient Wound Care Protocol    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 ([facility]          Status of wound progress and description from last visit: The wounds are healed. Diabetic foot care reinforced, routine paring of callus, daily inspection of feet, daily moisturizing ( prescription for ammonium lactate sent to pharmacy),  And injury prevention. To return if any further concerns. Plan:     Discharge Instructions         Discharge Instructions        PHYSICIAN ORDERS AND DISCHARGE INSTRUCTIONS     NOTE: Upon discharge from the [address], you will receive a patient experience survey. We would be grateful if you would take the time to fill this survey out.     Wound care order history:                 YADY's   Right       Left               Date:              Cultures:                Grafts:                Antibiotics:           Continuing wound care orders and information:                 Residence:  Private              Continue home health care with: PICC LINE ONLY                Your wound-care supplies will be provided by:      Wound cleansing:               FI not scrub or use excessive force.              Wash hands with soap and water before and after dressing changes.             AVCQV to applying a clean dressing, cleanse wound with normal saline, wound cleanser, or mild soap and water.               Ask the physician or nurse before getting the wound(s) wet in a shower     Daily Wound management:              Keep weight off wounds and reposition every 2 hours.              Avoid standing for long periods of time.              Apply wraps/stockings in AM and remove at bedtime.              If swelling is present, elevate legs to the level of the heart or above for 30 minutes 4-5 times a day and/or when sitting.                                      When taking antibiotics take entire prescription as ordered by physician do not stop taking until medicine is all gone.                                                           Orders for this week:  08/26/21                  Rx: Angela Paezt - pickup ammonium lactate 12% lotion 8/26/21     Left Foot - healed. In clinic - Apply double layer ca alginate and secure with medipore tape. Change at least weekly or when soiled. At [facility] adhesive felt pad to cushion and protect bruised area to plantar foot. Apply ammonium lactate lotion to bilateral lower extremities.       Discharged from wound clinic 8/26/21      Give Forefoot Offloader 6/7/21  X-ray ordered 6/7/21  Referral to Dr Sintia Vargas 6/7/21       Call [phone] for any questions or concerns.   Date__________   Time____________          Treatment Note      Written Patient Dismissal Instructions Given            Electronically signed by RAFAEL Villanueva CNP on 8/26/2021 at 4:18 PM

## 2021-10-19 ENCOUNTER — HOSPITAL ENCOUNTER (OUTPATIENT)
Dept: WOUND CARE | Age: 41
Discharge: HOME | End: 2021-10-19

## 2021-11-04 ENCOUNTER — HOSPITAL ENCOUNTER (OUTPATIENT)
Dept: WOUND CARE | Age: 41
Discharge: HOME | End: 2021-11-04
Payer: COMMERCIAL

## 2021-11-04 VITALS
DIASTOLIC BLOOD PRESSURE: 93 MMHG | TEMPERATURE: 98.1 F | SYSTOLIC BLOOD PRESSURE: 164 MMHG | HEART RATE: 87 BPM | RESPIRATION RATE: 16 BRPM

## 2021-11-04 DIAGNOSIS — E11.40 TYPE 2 DIABETES MELLITUS WITH DIABETIC NEUROPATHY, WITHOUT LONG-TERM CURRENT USE OF INSULIN (HCC): ICD-10-CM

## 2021-11-04 DIAGNOSIS — L97.412 DIABETIC ULCER OF RIGHT MIDFOOT ASSOCIATED WITH TYPE 2 DIABETES MELLITUS, WITH FAT LAYER EXPOSED (HCC): ICD-10-CM

## 2021-11-04 DIAGNOSIS — E11.621 DIABETIC ULCER OF TOE OF LEFT FOOT ASSOCIATED WITH TYPE 2 DIABETES MELLITUS, WITH NECROSIS OF MUSCLE (HCC): ICD-10-CM

## 2021-11-04 DIAGNOSIS — S98.132A AMPUTATION OF TOE OF LEFT FOOT (HCC): ICD-10-CM

## 2021-11-04 DIAGNOSIS — E11.8 DIABETIC FOOT (HCC): Primary | ICD-10-CM

## 2021-11-04 DIAGNOSIS — L97.523 DIABETIC ULCER OF TOE OF LEFT FOOT ASSOCIATED WITH TYPE 2 DIABETES MELLITUS, WITH NECROSIS OF MUSCLE (HCC): ICD-10-CM

## 2021-11-04 DIAGNOSIS — E11.621 DIABETIC ULCER OF RIGHT MIDFOOT ASSOCIATED WITH TYPE 2 DIABETES MELLITUS, WITH FAT LAYER EXPOSED (HCC): ICD-10-CM

## 2021-11-04 PROCEDURE — 11042 DBRDMT SUBQ TIS 1ST 20SQCM/<: CPT

## 2021-11-04 PROCEDURE — 11042 DBRDMT SUBQ TIS 1ST 20SQCM/<: CPT | Performed by: NURSE PRACTITIONER

## 2021-11-04 PROCEDURE — 87075 CULTR BACTERIA EXCEPT BLOOD: CPT

## 2021-11-04 PROCEDURE — 87077 CULTURE AEROBIC IDENTIFY: CPT

## 2021-11-04 PROCEDURE — 87070 CULTURE OTHR SPECIMN AEROBIC: CPT

## 2021-11-04 PROCEDURE — 87186 SC STD MICRODIL/AGAR DIL: CPT

## 2021-11-04 ASSESSMENT — PAIN DESCRIPTION - LOCATION: LOCATION: FOOT

## 2021-11-04 ASSESSMENT — PAIN SCALES - GENERAL: PAINLEVEL_OUTOF10: 3

## 2021-11-04 ASSESSMENT — PAIN DESCRIPTION - ONSET: ONSET: ON-GOING

## 2021-11-04 ASSESSMENT — PAIN DESCRIPTION - FREQUENCY: FREQUENCY: INTERMITTENT

## 2021-11-04 ASSESSMENT — PAIN DESCRIPTION - PROGRESSION: CLINICAL_PROGRESSION: NOT CHANGED

## 2021-11-04 ASSESSMENT — PAIN DESCRIPTION - DESCRIPTORS: DESCRIPTORS: TINGLING

## 2021-11-04 ASSESSMENT — PAIN - FUNCTIONAL ASSESSMENT: PAIN_FUNCTIONAL_ASSESSMENT: ACTIVITIES ARE NOT PREVENTED

## 2021-11-04 ASSESSMENT — PAIN DESCRIPTION - PAIN TYPE: TYPE: ACUTE PAIN

## 2021-11-04 ASSESSMENT — PAIN DESCRIPTION - ORIENTATION: ORIENTATION: LEFT

## 2021-11-04 NOTE — PLAN OF CARE
Problem: Wound:  Goal: Will show signs of wound healing; wound closure and no evidence of infection  Description: Will show signs of wound healing; wound closure and no evidence of infection  11/4/2021 1546 by Dagoberto Diane LPN  Outcome: Ongoing  11/4/2021 1546 by Dagoberto Diane LPN  Outcome: Ongoing

## 2021-11-04 NOTE — PROGRESS NOTES
Multilayer Compression Wrap   (Not Unna) Below the Knee    NAME:  Jessica Shell OF BIRTH:  1980  MEDICAL RECORD NUMBER:  [id number]  DATE:  11/4/2021    Multilayer compression wrap: Removed old Multilayer wrap if indicated and wash leg with mild soap/water. Applied moisturizing agent to dry skin as needed. Applied primary and secondary dressing as ordered. Applied multilayered dressing below the knee to right lower leg. Applied multilayered dressing below the knee to left lower leg. Instructed patient/caregiver not to remove dressing and to keep it clean and dry. Instructed patient/caregiver on complications to report to provider, such as pain, numbness in toes, heavy drainage, and slippage of dressing. Instructed patient on purpose of compression dressing and on activity and exercise recommendations.       Electronically signed by Rito Goyal LPN on 31/2/9976 at 1:67 PM

## 2021-11-05 PROBLEM — E11.621 DIABETIC ULCER OF RIGHT MIDFOOT ASSOCIATED WITH TYPE 2 DIABETES MELLITUS, WITH FAT LAYER EXPOSED (HCC): Status: ACTIVE | Noted: 2021-11-05

## 2021-11-05 PROBLEM — E11.621 DIABETIC ULCER OF TOE OF LEFT FOOT ASSOCIATED WITH TYPE 2 DIABETES MELLITUS, WITH NECROSIS OF MUSCLE (HCC): Status: ACTIVE | Noted: 2021-11-05

## 2021-11-05 PROBLEM — E11.621 DIABETIC ULCER OF LEFT MIDFOOT ASSOCIATED WITH TYPE 2 DIABETES MELLITUS, WITH NECROSIS OF MUSCLE (HCC): Status: RESOLVED | Noted: 2021-06-07 | Resolved: 2021-11-05

## 2021-11-05 PROBLEM — L97.423 DIABETIC ULCER OF LEFT MIDFOOT ASSOCIATED WITH TYPE 2 DIABETES MELLITUS, WITH NECROSIS OF MUSCLE (HCC): Status: RESOLVED | Noted: 2021-06-07 | Resolved: 2021-11-05

## 2021-11-05 PROBLEM — L97.412 DIABETIC ULCER OF RIGHT MIDFOOT ASSOCIATED WITH TYPE 2 DIABETES MELLITUS, WITH FAT LAYER EXPOSED (HCC): Status: ACTIVE | Noted: 2021-11-05

## 2021-11-05 PROBLEM — L97.523 DIABETIC ULCER OF TOE OF LEFT FOOT ASSOCIATED WITH TYPE 2 DIABETES MELLITUS, WITH NECROSIS OF MUSCLE (HCC): Status: ACTIVE | Noted: 2021-11-05

## 2021-11-05 RX ORDER — CEFUROXIME AXETIL 500 MG/1
500 TABLET ORAL 2 TIMES DAILY
Qty: 28 TABLET | Refills: 0 | Status: SHIPPED | OUTPATIENT
Start: 2021-11-05 | End: 2021-11-19

## 2021-11-05 RX ORDER — LINEZOLID 600 MG/1
600 TABLET, FILM COATED ORAL 2 TIMES DAILY
Qty: 28 TABLET | Refills: 0 | Status: SHIPPED | OUTPATIENT
Start: 2021-11-05 | End: 2021-11-19

## 2021-11-05 NOTE — PROGRESS NOTES
[facility] Initial Visit      Yasemin Rodriguez  AGE: 39 y.o. GENDER: male  : 1980  EPISODE DATE:  2021   Referred by: Self    Subjective:     CHIEF COMPLAINT WOUND RIGHT FOOT AND LEFT TOE     HISTORY of PRESENT ILLNESS      Yasemin Rodriguez is a 39 y.o. male who presents to the [address] [place] [address] for an initial visit for evaluation and treatment of Acute on chronic diabetic ulcers of the left second toe and right midfoot. The condition is of moderate severity. The ulcer has been present for approximately one month. The underlying cause is thought to be diabetes and pressure. The patients care to date has included nothing. The patient has significant underlying medical conditions as below. Wound Pain Timing/Severity: none  Quality of pain: N/A  Severity of pain:  0 / 10   Modifying Factors: diabetes, chronic pressure and shear force  Associated Signs/Symptoms: edema and drainage    Diabetes: Yes, on an oral regimen, last A1c 7.9 on 21  Smoking: Never smoker  Obesity:No  Anticoagulant therapy: No  Immunosuppression: No    Patient educated on the 6 essential components necessary for wound healing: Circulation, Debridements, Proper Dressings and Topical Wound Products, Infection Control, Edema Control and Offloading. Patient educated on those factors that negatively effect or impact wound healing: smoking, obesity, uncontrolled diabetes, anticoagulant and immunosuppressive regimens, inadequate nutrition, untreated arterial and venous disease if applicable and measures to manage edema.            PAST MEDICAL HISTORY        Diagnosis Date    Callus of foot     Right foot - see's Dr. Laraine Barthel Diabetic ulcer of left midfoot associated with type 2 diabetes mellitus, with necrosis of muscle ([address] 2021    Diabetic ulcer of right midfoot associated with type 2 diabetes mellitus, with fat layer exposed ([address] 2020    Dizziness     positional    Essential hypertension     Follows with PCP  Hyperlipidemia     Lumbar radiculopathy     Septic embolism ([facility] 6/13/2020       PAST SURGICAL HISTORY    Past Surgical History:   Procedure Laterality Date    ACHILLES TENDON SURGERY Right 1/8/2021    RIGHT ACHILLES TENDON LENGTHENING REPAIR performed by Naveed Vigil DPM at [address]  03/2018    [place]    DENTAL SURGERY      teeth extractions -half per patient    HERNIA REPAIR Bilateral 5/27/2020    BIALTERAL HERNIA INGUINAL REPAIR performed by Adam Bahena MD at [address] [place] [address]    [place] OFFICE/OUTPT VISIT,PROCEDURE ONLY Left 4/17/2018    L5-S1 HEMILAMINECTOMY, REMOVAL OF DISC LEFT SIDE performed by Ester Adan MD at [address] Right 1/8/2021    RIGHT TRANSMETATARSAL TOE AMPUTATION performed [facility] Naveed [facility]igil DPM at [facility]         FAMILY HISTORY    Family History   Problem Relation Age of Onset    Heart Disease Father     Diabetes Father     Diabetes Mother     Heart Disease Mother     Other Mother     Kidney Disease Mother     Heart Attack Mother        SOCIAL HISTORY    Social History     Tobacco Use    Smoking status: Never Smoker    Smokeless tobacco: Never Used   Vaping Use    Vaping Use: Never used   Substance Use Topics    Alcohol use: Yes     Comment: \"couple beers a night\"    Drug use: No       ALLERGIES    No Known Allergies    MEDICATIONS    Current Outpatient Medications on File Prior to Encounter   Medication Sig Dispense Refill    omeprazole (PRILOSEC) 20 MG delayed release capsule Take 1 capsule by mouth once daily in the morning 90 capsule 0    FLUoxetine (PROZAC) 40 MG capsule Take 40 mg by mouth daily      clobetasol (TEMOVATE) 0.05 % ointment Apply topically 2 times daily.  30 g 0    ondansetron (ZOFRAN-ODT) 4 MG disintegrating tablet Take 1 tablet by mouth every 8 hours as needed for Nausea or Vomiting 15 tablet 0    lisinopril (PRINIVIL;ZESTRIL) 10 MG tablet Take 1 tablet by mouth daily 30 tablet 5    metoprolol succinate (TOPROL XL) 25 MG extended release tablet Take 1 tablet by mouth daily 30 tablet 5    atorvastatin (LIPITOR) 40 MG tablet Take 1 tablet by mouth nightly 30 tablet 3    blood glucose monitor kit and supplies Dispense sufficient amount for indicated testing frequency plus additional to accommodate PRN testing needs. Dispense all needed supplies to include: monitor, strips, lancing device, lancets, control solutions, alcohol swabs. 1 kit 0    blood glucose monitor strips Test 2 times a day & as needed for symptoms of irregular blood glucose. Dispense sufficient amount for indicated testing frequency plus additional to accommodate PRN testing needs. 100 strip 0    FreeStyle Lancets MISC 1 each by Does not apply route daily 100 each 3    aspirin 81 MG tablet Take 81 mg by mouth daily      acetaminophen (TYLENOL) 325 MG tablet Take 2 tablets by mouth every 4 hours as needed for Pain or Fever 120 tablet 3    metFORMIN (GLUCOPHAGE) 500 MG tablet Take 2 tablets by mouth 2 times daily (with meals) Indications: Start tomorrow 03/14 360 tablet 1    glyBURIDE (DIABETA) 5 MG tablet Take 2 tablets by mouth 2 times daily (with meals) 360 tablet 1    pioglitazone (ACTOS) 15 MG tablet Take 1 tablet by mouth daily (Patient taking differently: Take 15 mg by mouth daily Pt states PRN) 30 tablet 5     No current facility-administered medications on file prior to encounter.        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 Objective:      BP (!) 164/93   Pulse 87   Temp 98.1 °F (36.7 °C) (Temporal)   Resp 16     PHYSICAL EXAM  General Appearance: alert and oriented to person, place and time, well-developed and well-nourished, in no acute distress  Pulmonary/Chest: clear to auscultation bilaterally- no wheezes, rales or rhonchi, normal air movement, no respiratory distress  Cardiovascular: normal rate, normal S1 and S2, no gallops and intact distal pulses  Extremities: no cyanosis, no clubbing, foot exam- normal color and temperature, he has  large calluses plantar feet, ulcers noted on right plantar midfoot and left second toe. The left second toe is edematous. Dermatologic exam: Visual inspection of the periwound reveals the skin to be edematous  Wound exam: see wound description below in procedure note      Assessment:       Buffy Pederson  appears to have a non-healing wound of the right plantar midfoot and left second toe. The etiology of the wound is felt to be diabetic and pressureThere are multiple complicating factors including diabetes, chronic pressure and shear force. A comprehensive wound management program would be helpful to heal this wound. Assessments completed include fall risk and nutritional, functional,and psychological status. At this time appropriate care would include: periodic debridement and wound care as below.      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 Performed by: RAFAEL Rascon - CNP    Consent obtained: Yes    Time out taken:  Yes    Pain Control: Anesthetic  Anesthetic: 4% Lidocaine Liquid Topical        Debridement:Excisional Debridement    Using curette the wound(s) was/were sharply debrided down through and including the removal of subcutaneous tissue. Devitalized Tissue Debrided:  slough, exudate and callus    Pre Debridement Measurements:  Are located in the Wound Documentation Flow Sheet    All active wounds listed below with today's date are evaluated  Wound(s)    debrided this date include # : 1 and 2     Post  Debridement Measurements:  Wound 11/04/21 Right;Plantar #1 (Active)   Wound Image   11/04/21 1414   Wound Etiology Diabetic 11/04/21 1414   Dressing Status New dressing applied;Clean;Dry; Intact 11/04/21 1545   Wound Cleansed Wound cleanser 11/04/21 1414   Wound Length (cm) 0.3 cm 11/04/21 1414   Wound Width (cm) 0.4 cm 11/04/21 1414   Wound Depth (cm) 0.2 cm 11/04/21 1414   Wound Surface Area (cm^2) 0.12 cm^2 11/04/21 1414   Wound Volume (cm^3) 0.024 cm^3 11/04/21 1414   Post-Procedure Length (cm) 0.3 cm 11/04/21 1503   Post-Procedure Width (cm) 0.4 cm 11/04/21 1503   Post-Procedure Depth (cm) 0.2 cm 11/04/21 1503   Post-Procedure Surface Area (cm^2) 0.12 cm^2 11/04/21 1503   Post-Procedure Volume (cm^3) 0.024 cm^3 11/04/21 1503   Distance Tunneling (cm) 0 cm 11/04/21 1414   Tunneling Position ___ O'Clock 0 11/04/21 1414   Undermining Starts ___ O'Clock 1200 11/04/21 1414   Undermining Ends___ O'Clock 1200 11/04/21 141 Undermining Maxium Distance (cm) 0.5 11/04/21 1414   Wound Assessment Pale granulation tissue 11/04/21 1414   Drainage Amount Moderate 11/04/21 1414   Drainage Description Serosanguinous 11/04/21 1414   Odor None 11/04/21 1414   Rosalie-wound Assessment Hyperkeratosis (callous) 11/04/21 1414   Margins Defined edges 11/04/21 1414   Wound Thickness Description not for Pressure Injury Full thickness 11/04/21 1414   Number of days: 0       Wound 11/04/21 Toe (Comment  which one) Anterior; Left #2   2nd toe (Active)   Wound Image   11/04/21 1414   Dressing Status New dressing applied;Clean;Dry; Intact 11/04/21 1545   Wound Cleansed Wound cleanser 11/04/21 1414   Wound Length (cm) 1.5 cm 11/04/21 1414   Wound Width (cm) 1.4 cm 11/04/21 1414   Wound Depth (cm) 0.1 cm 11/04/21 1414   Wound Surface Area (cm^2) 2.1 cm^2 11/04/21 1414   Wound Volume (cm^3) 0.21 cm^3 11/04/21 1414   Post-Procedure Length (cm) 1.5 cm 11/04/21 1503   Post-Procedure Width (cm) 1.4 cm 11/04/21 1503   Post-Procedure Depth (cm) 0.1 cm 11/04/21 1503   Post-Procedure Surface Area (cm^2) 2.1 cm^2 11/04/21 1503   Post-Procedure Volume (cm^3) 0.21 cm^3 11/04/21 1503   Distance Tunneling (cm) 0 cm 11/04/21 1414   Tunneling Position ___ O'Clock 0 11/04/21 1414   Undermining Starts ___ O'Clock 0 11/04/21 1414   Undermining Ends___ O'Clock 0 11/04/21 1414   Undermining Maxium Distance (cm) 0 11/04/21 1414   Wound Assessment Pink/red 11/04/21 1414   Drainage Amount Moderate 11/04/21 1414   Drainage Description Serosanguinous 11/04/21 1414   Odor None 11/04/21 1414   Rosalie-wound Assessment Fragile 11/04/21 1414   Margins Defined edges 11/04/21 1414   Wound Thickness Description not for Pressure Injury Full thickness 11/04/21 1414   Number of days: 0       Percent of Wound(s) Debrided: 100%    Total  Area  Debrided:  2.22 sq cm     Bleeding:  Minimal    Hemostasis Achieved:  by pressure    Procedural Pain:  0  / 10     Post Procedural Pain:  0 / 10     Response to treatment:  Well tolerated by patient. The wound was cultured, will use Gent to minimize bio-burden and collagen to build tissue. Keep skin clean and dry. Discussed local wound care and signs of infection. The right second toe is edematous and red likely with osteomyelitis, will start Ceftin and Zyvox and obtain an X ray. Plan:     Discharge Instructions       PHYSICIAN ORDERS AND DISCHARGE INSTRUCTIONS     NOTE: Upon discharge from the [address], you will receive a patient experience survey. We would be grateful if you would take the time to fill this survey out.     Wound care order history:                 YADY's   Right       Left               Date:              Cultures:  Right and Left foot wounds cultured 11/4/21              Grafts:                Antibiotics:           Continuing wound care orders and information:                 Residence:  Private              Continue home health care with: PICC LINE ONLY                Your wound-care supplies will be provided by:      Wound cleansing:               SL not scrub or use excessive force.              Wash hands with soap and water before and after dressing changes.               YPVIN to applying a clean dressing, cleanse wound with normal saline, wound cleanser, or mild soap and water.               Ask the physician or nurse before getting the wound(s) wet in a shower     Daily Wound management:              Keep weight off wounds and reposition every 2 hours.              Avoid standing for long periods of time.              Apply wraps/stockings in AM and remove at bedtime.              If swelling is present, elevate legs to the level of the heart or above for 30 minutes 4-5 times a day and/or when sitting.                                      When taking antibiotics take entire prescription as ordered by physician do not stop taking until medicine is all gone.                                                           Orders for this week:  11/4/21                  Rx: Anival Kamara    Right and Left foot wounds cultured 11/4/21    A&D to lower extremities    Left 2nd Toe and Right Plantar Foot - Wash with soap and water, pat dry. Apply Triple Thick Layer of ca alginate to periwounds (Neligh technique). Apply Gentamicin and stimulen powder to wound beds. Cover with foam. Wrap with Coban 2 Lite (enclose end of feet with 2nd layer of Coban 2 system). Leave in place for one week.       Follow up in 1 week (on Friday) in the wound care center. Call [phone] for any questions or concerns. Date__________   Time____________        Treatment Note Wound 11/04/21 Right;Plantar #1-Dressing/Treatment:  (gent stimulin, triple calci to serenity, coban 2 )  Wound 11/04/21 Toe (Comment  which one) Anterior; Left #2   2nd toe-Dressing/Treatment:  (gent stimulin, triple calci to serenity, coban 2 )    Written Patient Dismissal Instructions Given          Electronically signed by Rossie Bumpers, APRN - CNP on 11/5/2021 at 1:45 PM

## 2021-11-10 LAB
CULTURE: ABNORMAL
Lab: ABNORMAL
SPECIMEN: ABNORMAL

## 2021-12-02 ENCOUNTER — TELEPHONE (OUTPATIENT)
Dept: WOUND CARE | Age: 41
End: 2021-12-02

## 2021-12-09 ENCOUNTER — TELEPHONE (OUTPATIENT)
Dept: WOUND CARE | Age: 41
End: 2021-12-09

## 2021-12-20 DIAGNOSIS — K21.9 GASTROESOPHAGEAL REFLUX DISEASE WITHOUT ESOPHAGITIS: ICD-10-CM

## 2021-12-20 DIAGNOSIS — I10 ESSENTIAL HYPERTENSION: ICD-10-CM

## 2021-12-20 RX ORDER — LISINOPRIL 10 MG/1
10 TABLET ORAL DAILY
Qty: 30 TABLET | Refills: 1 | Status: SHIPPED | OUTPATIENT
Start: 2021-12-20 | End: 2022-01-24 | Stop reason: SDUPTHER

## 2021-12-20 RX ORDER — OMEPRAZOLE 20 MG/1
CAPSULE, DELAYED RELEASE ORAL
Qty: 30 CAPSULE | Refills: 1 | Status: SHIPPED | OUTPATIENT
Start: 2021-12-20 | End: 2022-01-24 | Stop reason: SDUPTHER

## 2021-12-20 RX ORDER — METOPROLOL SUCCINATE 25 MG/1
25 TABLET, EXTENDED RELEASE ORAL DAILY
Qty: 30 TABLET | Refills: 1 | Status: SHIPPED | OUTPATIENT
Start: 2021-12-20 | End: 2022-01-24 | Stop reason: SDUPTHER

## 2021-12-22 LAB — DIABETIC RETINOPATHY: NEGATIVE

## 2022-01-10 PROBLEM — E11.3393 MODERATE NONPROLIFERATIVE DIABETIC RETINOPATHY OF BOTH EYES WITHOUT MACULAR EDEMA ASSOCIATED WITH TYPE 2 DIABETES MELLITUS (HCC): Status: ACTIVE | Noted: 2022-01-10

## 2022-01-13 ENCOUNTER — TELEPHONE (OUTPATIENT)
Dept: WOUND CARE | Age: 42
End: 2022-01-13

## 2022-01-24 ENCOUNTER — OFFICE VISIT (OUTPATIENT)
Dept: FAMILY MEDICINE CLINIC | Age: 42
End: 2022-01-24
Payer: COMMERCIAL

## 2022-01-24 VITALS
WEIGHT: 188 LBS | DIASTOLIC BLOOD PRESSURE: 92 MMHG | SYSTOLIC BLOOD PRESSURE: 144 MMHG | HEIGHT: 72 IN | OXYGEN SATURATION: 98 % | HEART RATE: 82 BPM | BODY MASS INDEX: 25.47 KG/M2

## 2022-01-24 DIAGNOSIS — E11.40 TYPE 2 DIABETES MELLITUS WITH DIABETIC NEUROPATHY, WITHOUT LONG-TERM CURRENT USE OF INSULIN (HCC): ICD-10-CM

## 2022-01-24 DIAGNOSIS — K21.9 GASTROESOPHAGEAL REFLUX DISEASE WITHOUT ESOPHAGITIS: ICD-10-CM

## 2022-01-24 DIAGNOSIS — I10 ESSENTIAL HYPERTENSION: ICD-10-CM

## 2022-01-24 DIAGNOSIS — I20.0 UNSTABLE ANGINA (HCC): Primary | ICD-10-CM

## 2022-01-24 DIAGNOSIS — F41.1 GAD (GENERALIZED ANXIETY DISORDER): ICD-10-CM

## 2022-01-24 LAB
ANION GAP SERPL CALCULATED.3IONS-SCNC: 11 MMOL/L (ref 3–16)
BUN BLDV-MCNC: 11 MG/DL (ref 7–20)
CALCIUM SERPL-MCNC: 9.4 MG/DL (ref 8.3–10.6)
CHLORIDE BLD-SCNC: 95 MMOL/L (ref 99–110)
CO2: 25 MMOL/L (ref 21–32)
CREAT SERPL-MCNC: 0.9 MG/DL (ref 0.9–1.3)
GFR AFRICAN AMERICAN: >60
GFR NON-AFRICAN AMERICAN: >60
GLUCOSE BLD-MCNC: 260 MG/DL (ref 70–99)
POTASSIUM SERPL-SCNC: 4.8 MMOL/L (ref 3.5–5.1)
SODIUM BLD-SCNC: 131 MMOL/L (ref 136–145)

## 2022-01-24 PROCEDURE — G8417 CALC BMI ABV UP PARAM F/U: HCPCS | Performed by: FAMILY MEDICINE

## 2022-01-24 PROCEDURE — G8484 FLU IMMUNIZE NO ADMIN: HCPCS | Performed by: FAMILY MEDICINE

## 2022-01-24 PROCEDURE — 2022F DILAT RTA XM EVC RTNOPTHY: CPT | Performed by: FAMILY MEDICINE

## 2022-01-24 PROCEDURE — 99214 OFFICE O/P EST MOD 30 MIN: CPT | Performed by: FAMILY MEDICINE

## 2022-01-24 PROCEDURE — 1036F TOBACCO NON-USER: CPT | Performed by: FAMILY MEDICINE

## 2022-01-24 PROCEDURE — 3046F HEMOGLOBIN A1C LEVEL >9.0%: CPT | Performed by: FAMILY MEDICINE

## 2022-01-24 PROCEDURE — G8427 DOCREV CUR MEDS BY ELIG CLIN: HCPCS | Performed by: FAMILY MEDICINE

## 2022-01-24 RX ORDER — METOPROLOL SUCCINATE 25 MG/1
25 TABLET, EXTENDED RELEASE ORAL DAILY
Qty: 90 TABLET | Refills: 1 | Status: ON HOLD | OUTPATIENT
Start: 2022-01-24 | End: 2022-04-27 | Stop reason: SDUPTHER

## 2022-01-24 RX ORDER — ATORVASTATIN CALCIUM 40 MG/1
40 TABLET, FILM COATED ORAL NIGHTLY
Qty: 90 TABLET | Refills: 1 | Status: ON HOLD | OUTPATIENT
Start: 2022-01-24 | End: 2022-04-27

## 2022-01-24 RX ORDER — LISINOPRIL 10 MG/1
10 TABLET ORAL DAILY
Qty: 90 TABLET | Refills: 1 | Status: SHIPPED | OUTPATIENT
Start: 2022-01-24 | End: 2022-02-04 | Stop reason: SINTOL

## 2022-01-24 RX ORDER — OMEPRAZOLE 20 MG/1
CAPSULE, DELAYED RELEASE ORAL
Qty: 90 CAPSULE | Refills: 1 | Status: ON HOLD | OUTPATIENT
Start: 2022-01-24 | End: 2022-04-27 | Stop reason: SDUPTHER

## 2022-01-24 RX ORDER — HYDROCHLOROTHIAZIDE 12.5 MG/1
12.5 CAPSULE, GELATIN COATED ORAL DAILY
Qty: 30 CAPSULE | Refills: 3 | Status: ON HOLD | OUTPATIENT
Start: 2022-01-24 | End: 2022-04-27 | Stop reason: HOSPADM

## 2022-01-24 RX ORDER — GLYBURIDE 5 MG/1
10 TABLET ORAL 2 TIMES DAILY WITH MEALS
Qty: 360 TABLET | Refills: 1 | Status: ON HOLD | OUTPATIENT
Start: 2022-01-24 | End: 2022-04-27

## 2022-01-24 RX ORDER — ESCITALOPRAM OXALATE 10 MG/1
10 TABLET ORAL DAILY
Qty: 30 TABLET | Refills: 5 | Status: ON HOLD | OUTPATIENT
Start: 2022-01-24 | End: 2022-04-27 | Stop reason: SDUPTHER

## 2022-01-24 RX ORDER — PIOGLITAZONEHYDROCHLORIDE 15 MG/1
15 TABLET ORAL DAILY
Qty: 90 TABLET | Refills: 1 | Status: SHIPPED | OUTPATIENT
Start: 2022-01-24 | End: 2022-01-26 | Stop reason: SDUPTHER

## 2022-01-24 RX ORDER — FLUOXETINE HYDROCHLORIDE 40 MG/1
40 CAPSULE ORAL DAILY
Qty: 90 CAPSULE | Refills: 1 | Status: CANCELLED | OUTPATIENT
Start: 2022-01-24

## 2022-01-24 SDOH — ECONOMIC STABILITY: FOOD INSECURITY: WITHIN THE PAST 12 MONTHS, THE FOOD YOU BOUGHT JUST DIDN'T LAST AND YOU DIDN'T HAVE MONEY TO GET MORE.: NEVER TRUE

## 2022-01-24 SDOH — ECONOMIC STABILITY: FOOD INSECURITY: WITHIN THE PAST 12 MONTHS, YOU WORRIED THAT YOUR FOOD WOULD RUN OUT BEFORE YOU GOT MONEY TO BUY MORE.: NEVER TRUE

## 2022-01-24 ASSESSMENT — SOCIAL DETERMINANTS OF HEALTH (SDOH): HOW HARD IS IT FOR YOU TO PAY FOR THE VERY BASICS LIKE FOOD, HOUSING, MEDICAL CARE, AND HEATING?: NOT HARD AT ALL

## 2022-01-24 NOTE — PROGRESS NOTES
1/24/2022    Jana Hamilton    Chief Complaint   Patient presents with    6 Month Follow-Up    Depression       HPI    Maddie Antunez is a 39 y.o. male who presents today with follow-up. Patient quite anxious as his mom is needing open heart surgery with valve replacement. She is a poor candidate for surgery but needs to have it. Patient does not have home blood pressure checks but acknowledges today is high    Patient is up 16 pounds but he believes it is due to eating not to water.     REVIEW OF SYSTEMS    Constitutional:  Denies fever, chills, weight loss or weakness  Eyes:  no photophobia or discharge  ENT:  no sore throat or ear pain  Cardiovascular:  Denies chest pain, palpitations or swelling  Respiratory:  Denies cough or shortness of breath  GI:  no abdominal pain, nausea, vomiting, or diarrhea  Musculoskeletal:  no back pain  Skin:  No rashes  Neurologic:  no headache, focal weakness, or sensory changes  Endocrine:  no polyuria or polydipsia      PAST MEDICAL HISTORY  Past Medical History:   Diagnosis Date    Callus of foot     Right foot - see's Dr. Melissa Rebolledo Diabetic ulcer of left midfoot associated with type 2 diabetes mellitus, with necrosis of muscle ([address] 6/7/2021    Diabetic ulcer of right midfoot associated with type 2 diabetes mellitus, with fat layer exposed ([address] 8/11/2020    Dizziness     positional    Essential hypertension     Follows with PCP    Hyperlipidemia     Lumbar radiculopathy     Septic embolism ([address] 6/13/2020       FAMILY HISTORY  Family History   Problem Relation Age of Onset    Heart Disease Father     Diabetes Father     Diabetes Mother     Heart Disease Mother     Other Mother     Kidney Disease Mother     Heart Attack Mother        SOCIAL HISTORY  Social History     Socioeconomic History    Marital status: Single     Spouse name: Not on file    Number of children: Not on file    Years of education: Not on file    Highest education level: Not on file Occupational History    Not on file   Tobacco Use    Smoking status: Never Smoker    Smokeless tobacco: Never Used   Vaping Use    Vaping Use: Never used   Substance and Sexual Activity    Alcohol use: Yes     Comment: \"couple beers a night\"    Drug use: No    Sexual activity: Yes     Partners: Female   Other Topics Concern    Not on file   Social History Narrative    Not on file     Social Determinants of Health     Financial Resource Strain: Low Risk     Difficulty of Paying Living Expenses: Not hard at all   Food Insecurity: No Food Insecurity    Worried About Running Out of Food in the Last Year: Never true    Margarita of Food in the Last Year: Never true   Transportation Needs:     Lack of Transportation (Medical): Not on file    Lack of Transportation (Non-Medical):  Not on file   Physical Activity:     Days of Exercise per Week: Not on file    Minutes of Exercise per Session: Not on file   Stress:     Feeling of Stress : Not on file   Social Connections:     Frequency of Communication with Friends and Family: Not on file    Frequency of Social Gatherings with Friends and Family: Not on file    Attends Religion Services: Not on file    Active Member of [address] [place] [address] or Organizations: Not on file    Attends Club or Organization Meetings: Not on file    Marital Status: Not on file   Intimate Partner Violence:     Fear of Current or Ex-Partner: Not on file    Emotionally Abused: Not on file    Physically Abused: Not on file    Sexually Abused: Not on file   Housing Stability:     Unable to Pay for Housing in the Last Year: Not on file    Number of [place] in the Last Year: Not on file    Unstable Housing in the Last Year: Not on file        SURGICAL HISTORY  Past Surgical History:   Procedure Laterality Date    ACHILLES TENDON SURGERY Right 1/8/2021    RIGHT ACHILLES [address] performed by Mike Gross DPM [facility]  03/[facility]    DENTAL SURGERY      teeth extractions -half per patient    HERNIA REPAIR Bilateral 5/27/2020    BIALTERAL HERNIA INGUINAL REPAIR performed by Yoav Campo MD at [facility]  2018    [place] OFFICE/OUTPT VISIT,PROCEDURE ONLY Left 4/17/2018    L5-S1 HEMILAMINECTOMY, REMOVAL OF DISC LEFT SIDE performed by Jessenia Mclean MD at [facility] Right 1/8/2021    RIGHT TRANSMETATARSAL TOE AMPUTATION performed by Katherine Mckay DPM at [address] [place] [address] EXTRACTION         CURRENT MEDICATIONS  Current Outpatient Medications   Medication Sig Dispense Refill    metoprolol succinate (TOPROL XL) 25 MG extended release tablet Take 1 tablet by mouth daily 90 tablet 1    omeprazole (PRILOSEC) 20 MG delayed release capsule Take 1 capsule by mouth once daily in the morning 90 capsule 1    lisinopril (PRINIVIL;ZESTRIL) 10 MG tablet Take 1 tablet by mouth daily 90 tablet 1    metFORMIN (GLUCOPHAGE) 500 MG tablet Take 2 tablets by mouth 2 times daily (with meals) Indications: Start tomorrow 03/14 360 tablet 1    atorvastatin (LIPITOR) 40 MG tablet Take 1 tablet by mouth nightly 90 tablet 1    glyBURIDE (DIABETA) 5 MG tablet Take 2 tablets by mouth 2 times daily (with meals) 360 tablet 1    pioglitazone (ACTOS) 15 MG tablet Take 1 tablet by mouth daily 90 tablet 1    escitalopram (LEXAPRO) 10 MG tablet Take 1 tablet by mouth daily 30 tablet 5    hydroCHLOROthiazide (MICROZIDE) 12.5 MG capsule Take 1 capsule by mouth daily 30 capsule 3    blood glucose monitor kit and supplies Dispense sufficient amount for indicated testing frequency plus additional to accommodate PRN testing needs. Dispense all needed supplies to include: monitor, strips, lancing device, lancets, control solutions, alcohol swabs. 1 kit 0    blood glucose monitor strips Test 2 times a day & as needed for symptoms of irregular blood glucose.  Dispense sufficient amount for indicated testing frequency plus additional to accommodate PRN testing needs. 100 strip 0    FreeStyle Lancets MISC 1 each by Does not apply route daily 100 each 3    aspirin 81 MG tablet Take 81 mg by mouth daily      acetaminophen (TYLENOL) 325 MG tablet Take 2 tablets by mouth every 4 hours as needed for Pain or Fever 120 tablet 3     No current facility-administered medications for this visit. ALLERGIES  No Known Allergies    PHYSICAL EXAM  BP (!) 144/92   Pulse 82   Ht 6' (1.829 m)   Wt 188 lb (85.3 kg)   SpO2 98%   BMI 25.50 kg/m²     ASSESSMENT & PLAN    1. Unstable angina (HCC)  No chest pain today. Monitor  - hydroCHLOROthiazide (MICROZIDE) 12.5 MG capsule; Take 1 capsule by mouth daily  Dispense: 30 capsule; Refill: 3    2. Essential hypertension  Added the hydrochlorothiazide  To home blood pressure checks    - metoprolol succinate (TOPROL XL) 25 MG extended release tablet; Take 1 tablet by mouth daily  Dispense: 90 tablet; Refill: 1  - lisinopril (PRINIVIL;ZESTRIL) 10 MG tablet; Take 1 tablet by mouth daily  Dispense: 90 tablet; Refill: 1  - Basic Metabolic Panel; Future    3. Type 2 diabetes mellitus with diabetic neuropathy, without long-term current use of insulin (HCC)  Issue is stable check labs today. Adjust medication off of lab results. - pioglitazone (ACTOS) 15 MG tablet; Take 1 tablet by mouth daily  Dispense: 90 tablet; Refill: 1  - Hemoglobin A1C; Future  -Metformin 500 mg 2 pills twice daily #360 refill 1  -Glipizide 5 mg 1 twice daily with meals #180 refill 1    4. Gastroesophageal reflux disease without esophagitis  Issue controlled. Continue meds. Refilled meds. - omeprazole (PRILOSEC) 20 MG delayed release capsule; Take 1 capsule by mouth once daily in the morning  Dispense: 90 capsule; Refill: 1    6. JODY (generalized anxiety disorder)  DC Prozac  Start Lexapro    - escitalopram (LEXAPRO) 10 MG tablet; Take 1 tablet by mouth daily  Dispense: 30 tablet;  Refill: 5    Patient billed off total time-30minutes  5 minutes prechart review  20 minutes spent with patient  5 minutes creating note         Follow-up in 4 weeks    Electronically signed by Princess Erick MD on 1/24/2022

## 2022-01-25 LAB
ESTIMATED AVERAGE GLUCOSE: 217.3 MG/DL
HBA1C MFR BLD: 9.2 %

## 2022-01-26 DIAGNOSIS — E11.40 TYPE 2 DIABETES MELLITUS WITH DIABETIC NEUROPATHY, WITHOUT LONG-TERM CURRENT USE OF INSULIN (HCC): ICD-10-CM

## 2022-01-26 RX ORDER — PIOGLITAZONEHYDROCHLORIDE 30 MG/1
30 TABLET ORAL DAILY
Qty: 90 TABLET | Refills: 1 | Status: ON HOLD | OUTPATIENT
Start: 2022-01-26 | End: 2022-04-27

## 2022-02-01 NOTE — TELEPHONE ENCOUNTER
To Dr. Jn Messina---    Patient wants to know if he is suppose to take his anti-depressants in the morning or at night----said he read the bottle and it says, it can cause drowsiness---Also, is he suppose to take the escitalopram (LEXAPRO) 10 MG tablet  And the FLUoxetine (PROZAC) 40 MG capsule---------    The Prozac 40 mg was not called in so he only has the 10 mg of Lexapro to take.     Please call him at: [phone]  Or  [phone]

## 2022-02-02 RX ORDER — FLUOXETINE HYDROCHLORIDE 20 MG/1
40 CAPSULE ORAL DAILY
Qty: 60 CAPSULE | Refills: 5 | Status: ON HOLD | OUTPATIENT
Start: 2022-02-02 | End: 2022-04-27 | Stop reason: SDUPTHER

## 2022-02-04 ENCOUNTER — TELEPHONE (OUTPATIENT)
Dept: FAMILY MEDICINE CLINIC | Age: 42
End: 2022-02-04

## 2022-02-04 RX ORDER — LOSARTAN POTASSIUM 25 MG/1
25 TABLET ORAL DAILY
Qty: 30 TABLET | Refills: 5 | Status: ON HOLD | OUTPATIENT
Start: 2022-02-04 | End: 2022-04-27 | Stop reason: SDUPTHER

## 2022-02-04 NOTE — TELEPHONE ENCOUNTER
----- Message from [address] sent at 2/4/2022 10:00 AM EST -----  Subject: Message to Provider    QUESTIONS  Information for Provider? Pt has a cough that will not go away since   starting the following 2 new medications prescribed by Dr. Luis Thompson. Escitalopran 10mg at night & atorvastatin 40mg at night. The symptoms are   a cough only per the patient. He wants to know if he should continue   taking these medications. The pt was transferred to the office to speak   with staff regarding his symptoms. ---------------------------------------------------------------------------  --------------  Marilin DILLARD  What is the best way for the office to contact you? OK to leave message on   voicemail  Preferred Call Back Phone Number? [phone]  ---------------------------------------------------------------------------  --------------  SCRIPT ANSWERS  Relationship to Patient?  Self

## 2022-02-04 NOTE — TELEPHONE ENCOUNTER
Spoke with pt informed re persistant dry cough per dr Rj Curtis please stop lisinopril start losartan 25 mg 1 qd.  Already sent to [facility]

## 2022-02-04 NOTE — TELEPHONE ENCOUNTER
----- Message from Barbara Life sent at 2/4/2022  1:40 PM EST -----  Subject: Message to Provider    QUESTIONS  Information for Provider? Patient is calling he missed a call from the   doctor assistant \"Tania\" and was returning the call. Please call him back   as soon as possible. Thank you.  ---------------------------------------------------------------------------  --------------  CALL BACK INFO  What is the best way for the office to contact you? OK to leave message on   voicemail  Preferred Call Back Phone Number? [phone]  ---------------------------------------------------------------------------  --------------  SCRIPT ANSWERS  Relationship to Patient?  Self

## 2022-02-04 NOTE — TELEPHONE ENCOUNTER
I am sorry to hear about the cough. Cough is not associated with either of the 2 new medicines but it can be associated with the lisinopril. Please stop the lisinopril 10 mg daily. Please replace it with losartan 25 mg 1 daily #30 refill 5. Please make a follow-up appointment with myself or one of our PAs in 2 weeks. If the cough resolve then we need to make sure your new blood pressure medicine has your pressure to goal and we need to alejandro you is allergic to lisinopril. If the cough does not go away, then we need to look for another reason why you have a dry cough. Be safe.

## 2022-02-07 ENCOUNTER — TELEPHONE (OUTPATIENT)
Dept: FAMILY MEDICINE CLINIC | Age: 42
End: 2022-02-07

## 2022-04-18 ENCOUNTER — APPOINTMENT (OUTPATIENT)
Dept: GENERAL RADIOLOGY | Age: 42
DRG: 710 | End: 2022-04-18
Payer: COMMERCIAL

## 2022-04-18 ENCOUNTER — HOSPITAL ENCOUNTER (INPATIENT)
Age: 42
LOS: 9 days | Discharge: HOME HEALTH | DRG: 710 | End: 2022-04-27
Attending: INTERNAL MEDICINE | Admitting: INTERNAL MEDICINE
Payer: COMMERCIAL

## 2022-04-18 ENCOUNTER — APPOINTMENT (OUTPATIENT)
Dept: ULTRASOUND IMAGING | Age: 42
DRG: 710 | End: 2022-04-18
Payer: COMMERCIAL

## 2022-04-18 DIAGNOSIS — I10 ESSENTIAL HYPERTENSION: ICD-10-CM

## 2022-04-18 DIAGNOSIS — M86.179 ACUTE OSTEOMYELITIS OF FOOT (HCC): Primary | ICD-10-CM

## 2022-04-18 DIAGNOSIS — K21.9 GASTROESOPHAGEAL REFLUX DISEASE WITHOUT ESOPHAGITIS: ICD-10-CM

## 2022-04-18 DIAGNOSIS — R73.9 HYPERGLYCEMIA: ICD-10-CM

## 2022-04-18 DIAGNOSIS — I82.452 ACUTE DEEP VEIN THROMBOSIS (DVT) OF LEFT PERONEAL VEIN (HCC): ICD-10-CM

## 2022-04-18 DIAGNOSIS — M86.272 SUBACUTE OSTEOMYELITIS OF LEFT FOOT (HCC): ICD-10-CM

## 2022-04-18 DIAGNOSIS — E11.40 TYPE 2 DIABETES MELLITUS WITH DIABETIC NEUROPATHY, WITHOUT LONG-TERM CURRENT USE OF INSULIN (HCC): ICD-10-CM

## 2022-04-18 DIAGNOSIS — F41.1 GAD (GENERALIZED ANXIETY DISORDER): ICD-10-CM

## 2022-04-18 PROBLEM — M86.10 ACUTE OSTEOMYELITIS (HCC): Status: ACTIVE | Noted: 2022-04-18

## 2022-04-18 LAB
ALBUMIN SERPL-MCNC: 2.9 GM/DL (ref 3.4–5)
ALP BLD-CCNC: 111 IU/L (ref 40–129)
ALT SERPL-CCNC: 8 U/L (ref 10–40)
ANION GAP SERPL CALCULATED.3IONS-SCNC: 15 MMOL/L (ref 4–16)
AST SERPL-CCNC: 12 IU/L (ref 15–37)
BACTERIA: NEGATIVE /HPF
BASOPHILS ABSOLUTE: 0 K/CU MM
BASOPHILS RELATIVE PERCENT: 0.3 % (ref 0–1)
BILIRUB SERPL-MCNC: 0 MG/DL (ref 0–1)
BILIRUBIN URINE: NEGATIVE MG/DL
BLOOD, URINE: ABNORMAL
BUN BLDV-MCNC: 12 MG/DL (ref 6–23)
CALCIUM SERPL-MCNC: 9.2 MG/DL (ref 8.3–10.6)
CHLORIDE BLD-SCNC: 93 MMOL/L (ref 99–110)
CHP ED QC CHECK: NORMAL
CLARITY: CLEAR
CO2: 19 MMOL/L (ref 21–32)
COLOR: YELLOW
CREAT SERPL-MCNC: <0.5 MG/DL (ref 0.9–1.3)
DIFFERENTIAL TYPE: ABNORMAL
EOSINOPHILS ABSOLUTE: 0.1 K/CU MM
EOSINOPHILS RELATIVE PERCENT: 0.3 % (ref 0–3)
GFR AFRICAN AMERICAN: ABNORMAL ML/MIN/1.73M2
GFR NON-AFRICAN AMERICAN: ABNORMAL ML/MIN/1.73M2
GLUCOSE BLD-MCNC: 15 MG/DL (ref 70–99)
GLUCOSE BLD-MCNC: 199 MG/DL (ref 70–99)
GLUCOSE BLD-MCNC: 212 MG/DL
GLUCOSE BLD-MCNC: 212 MG/DL (ref 70–99)
GLUCOSE BLD-MCNC: 271 MG/DL (ref 70–99)
GLUCOSE, URINE: 250 MG/DL
HCT VFR BLD CALC: 35 % (ref 42–52)
HEMOGLOBIN: 10.9 GM/DL (ref 13.5–18)
HYALINE CASTS: 10 /LPF
IMMATURE NEUTROPHIL %: 0.6 % (ref 0–0.43)
KETONES, URINE: NEGATIVE MG/DL
LACTATE: 1.1 MMOL/L (ref 0.4–2)
LEUKOCYTE ESTERASE, URINE: NEGATIVE
LYMPHOCYTES ABSOLUTE: 1.1 K/CU MM
LYMPHOCYTES RELATIVE PERCENT: 7.3 % (ref 24–44)
MCH RBC QN AUTO: 28.9 PG (ref 27–31)
MCHC RBC AUTO-ENTMCNC: 31.1 % (ref 32–36)
MCV RBC AUTO: 92.8 FL (ref 78–100)
MONOCYTES ABSOLUTE: 1 K/CU MM
MONOCYTES RELATIVE PERCENT: 6.9 % (ref 0–4)
MUCUS: ABNORMAL HPF
NITRITE URINE, QUANTITATIVE: NEGATIVE
NUCLEATED RBC %: 0 %
PDW BLD-RTO: 13 % (ref 11.7–14.9)
PH, URINE: 5.5 (ref 5–8)
PLATELET # BLD: 623 K/CU MM (ref 140–440)
PMV BLD AUTO: 9.6 FL (ref 7.5–11.1)
POTASSIUM SERPL-SCNC: 4.5 MMOL/L (ref 3.5–5.1)
PROTEIN UA: 100 MG/DL
RBC # BLD: 3.77 M/CU MM (ref 4.6–6.2)
RBC URINE: 2 /HPF (ref 0–3)
SEGMENTED NEUTROPHILS ABSOLUTE COUNT: 12.5 K/CU MM
SEGMENTED NEUTROPHILS RELATIVE PERCENT: 84.6 % (ref 36–66)
SODIUM BLD-SCNC: 127 MMOL/L (ref 135–145)
SPECIFIC GRAVITY UA: 1.02 (ref 1–1.03)
SQUAMOUS EPITHELIAL: <1 /HPF
TOTAL IMMATURE NEUTOROPHIL: 0.09 K/CU MM
TOTAL NUCLEATED RBC: 0 K/CU MM
TOTAL PROTEIN: 0.8 GM/DL (ref 6.4–8.2)
TRICHOMONAS: ABNORMAL /HPF
UROBILINOGEN, URINE: 0.2 MG/DL (ref 0.2–1)
WBC # BLD: 14.7 K/CU MM (ref 4–10.5)
WBC UA: 1 /HPF (ref 0–2)

## 2022-04-18 PROCEDURE — 96365 THER/PROPH/DIAG IV INF INIT: CPT

## 2022-04-18 PROCEDURE — 2580000003 HC RX 258: Performed by: NURSE PRACTITIONER

## 2022-04-18 PROCEDURE — 73630 X-RAY EXAM OF FOOT: CPT

## 2022-04-18 PROCEDURE — 2060000000 HC ICU INTERMEDIATE R&B

## 2022-04-18 PROCEDURE — 83036 HEMOGLOBIN GLYCOSYLATED A1C: CPT

## 2022-04-18 PROCEDURE — 87040 BLOOD CULTURE FOR BACTERIA: CPT

## 2022-04-18 PROCEDURE — 87186 SC STD MICRODIL/AGAR DIL: CPT

## 2022-04-18 PROCEDURE — 96361 HYDRATE IV INFUSION ADD-ON: CPT

## 2022-04-18 PROCEDURE — 83605 ASSAY OF LACTIC ACID: CPT

## 2022-04-18 PROCEDURE — 87070 CULTURE OTHR SPECIMN AEROBIC: CPT

## 2022-04-18 PROCEDURE — 6360000002 HC RX W HCPCS: Performed by: PHYSICIAN ASSISTANT

## 2022-04-18 PROCEDURE — 87075 CULTR BACTERIA EXCEPT BLOOD: CPT

## 2022-04-18 PROCEDURE — 81001 URINALYSIS AUTO W/SCOPE: CPT

## 2022-04-18 PROCEDURE — 6360000002 HC RX W HCPCS: Performed by: NURSE PRACTITIONER

## 2022-04-18 PROCEDURE — 93005 ELECTROCARDIOGRAM TRACING: CPT

## 2022-04-18 PROCEDURE — 84145 PROCALCITONIN (PCT): CPT

## 2022-04-18 PROCEDURE — 85025 COMPLETE CBC W/AUTO DIFF WBC: CPT

## 2022-04-18 PROCEDURE — 80053 COMPREHEN METABOLIC PANEL: CPT

## 2022-04-18 PROCEDURE — 87077 CULTURE AEROBIC IDENTIFY: CPT

## 2022-04-18 PROCEDURE — 6370000000 HC RX 637 (ALT 250 FOR IP): Performed by: NURSE PRACTITIONER

## 2022-04-18 PROCEDURE — 6370000000 HC RX 637 (ALT 250 FOR IP): Performed by: PHYSICIAN ASSISTANT

## 2022-04-18 PROCEDURE — 99285 EMERGENCY DEPT VISIT HI MDM: CPT

## 2022-04-18 PROCEDURE — 82962 GLUCOSE BLOOD TEST: CPT

## 2022-04-18 PROCEDURE — 96372 THER/PROPH/DIAG INJ SC/IM: CPT

## 2022-04-18 PROCEDURE — 99254 IP/OBS CNSLTJ NEW/EST MOD 60: CPT | Performed by: INTERNAL MEDICINE

## 2022-04-18 PROCEDURE — 93971 EXTREMITY STUDY: CPT

## 2022-04-18 PROCEDURE — 2580000003 HC RX 258: Performed by: PHYSICIAN ASSISTANT

## 2022-04-18 RX ORDER — ATORVASTATIN CALCIUM 40 MG/1
40 TABLET, FILM COATED ORAL NIGHTLY
Status: DISCONTINUED | OUTPATIENT
Start: 2022-04-18 | End: 2022-04-27 | Stop reason: HOSPADM

## 2022-04-18 RX ORDER — SENNA AND DOCUSATE SODIUM 50; 8.6 MG/1; MG/1
1 TABLET, FILM COATED ORAL 2 TIMES DAILY
Status: DISCONTINUED | OUTPATIENT
Start: 2022-04-18 | End: 2022-04-27 | Stop reason: HOSPADM

## 2022-04-18 RX ORDER — ESCITALOPRAM OXALATE 10 MG/1
10 TABLET ORAL DAILY
Status: DISCONTINUED | OUTPATIENT
Start: 2022-04-19 | End: 2022-04-27 | Stop reason: HOSPADM

## 2022-04-18 RX ORDER — PANTOPRAZOLE SODIUM 40 MG/1
40 TABLET, DELAYED RELEASE ORAL
Status: DISCONTINUED | OUTPATIENT
Start: 2022-04-19 | End: 2022-04-27 | Stop reason: HOSPADM

## 2022-04-18 RX ORDER — DEXTROSE MONOHYDRATE 25 G/50ML
12.5 INJECTION, SOLUTION INTRAVENOUS PRN
Status: DISCONTINUED | OUTPATIENT
Start: 2022-04-18 | End: 2022-04-27 | Stop reason: HOSPADM

## 2022-04-18 RX ORDER — LOSARTAN POTASSIUM 25 MG/1
25 TABLET ORAL DAILY
Status: DISCONTINUED | OUTPATIENT
Start: 2022-04-19 | End: 2022-04-19

## 2022-04-18 RX ORDER — SODIUM CHLORIDE 9 MG/ML
INJECTION, SOLUTION INTRAVENOUS CONTINUOUS
Status: DISCONTINUED | OUTPATIENT
Start: 2022-04-18 | End: 2022-04-24

## 2022-04-18 RX ORDER — INSULIN GLARGINE 100 [IU]/ML
0.25 INJECTION, SOLUTION SUBCUTANEOUS NIGHTLY
Status: DISCONTINUED | OUTPATIENT
Start: 2022-04-18 | End: 2022-04-19

## 2022-04-18 RX ORDER — 0.9 % SODIUM CHLORIDE 0.9 %
1000 INTRAVENOUS SOLUTION INTRAVENOUS ONCE
Status: COMPLETED | OUTPATIENT
Start: 2022-04-18 | End: 2022-04-18

## 2022-04-18 RX ORDER — ASPIRIN 81 MG/1
81 TABLET ORAL DAILY
Status: DISCONTINUED | OUTPATIENT
Start: 2022-04-18 | End: 2022-04-26

## 2022-04-18 RX ORDER — HYDROCODONE BITARTRATE AND ACETAMINOPHEN 5; 325 MG/1; MG/1
1 TABLET ORAL EVERY 4 HOURS PRN
Status: DISCONTINUED | OUTPATIENT
Start: 2022-04-18 | End: 2022-04-27 | Stop reason: HOSPADM

## 2022-04-18 RX ORDER — SODIUM CHLORIDE 0.9 % (FLUSH) 0.9 %
5-40 SYRINGE (ML) INJECTION PRN
Status: DISCONTINUED | OUTPATIENT
Start: 2022-04-18 | End: 2022-04-27 | Stop reason: HOSPADM

## 2022-04-18 RX ORDER — DEXTROSE MONOHYDRATE 50 MG/ML
100 INJECTION, SOLUTION INTRAVENOUS PRN
Status: DISCONTINUED | OUTPATIENT
Start: 2022-04-18 | End: 2022-04-27 | Stop reason: HOSPADM

## 2022-04-18 RX ORDER — ACETAMINOPHEN 325 MG/1
650 TABLET ORAL EVERY 4 HOURS PRN
Status: DISCONTINUED | OUTPATIENT
Start: 2022-04-18 | End: 2022-04-18 | Stop reason: SDUPTHER

## 2022-04-18 RX ORDER — ACETAMINOPHEN 325 MG/1
650 TABLET ORAL ONCE
Status: COMPLETED | OUTPATIENT
Start: 2022-04-18 | End: 2022-04-18

## 2022-04-18 RX ORDER — ONDANSETRON 2 MG/ML
4 INJECTION INTRAMUSCULAR; INTRAVENOUS EVERY 6 HOURS PRN
Status: DISCONTINUED | OUTPATIENT
Start: 2022-04-18 | End: 2022-04-27 | Stop reason: HOSPADM

## 2022-04-18 RX ORDER — METOPROLOL SUCCINATE 25 MG/1
25 TABLET, EXTENDED RELEASE ORAL DAILY
Status: DISCONTINUED | OUTPATIENT
Start: 2022-04-18 | End: 2022-04-27 | Stop reason: HOSPADM

## 2022-04-18 RX ORDER — FLUOXETINE 10 MG/1
40 CAPSULE ORAL DAILY
Status: DISCONTINUED | OUTPATIENT
Start: 2022-04-19 | End: 2022-04-27 | Stop reason: HOSPADM

## 2022-04-18 RX ORDER — ACETAMINOPHEN 650 MG/1
650 SUPPOSITORY RECTAL EVERY 6 HOURS PRN
Status: DISCONTINUED | OUTPATIENT
Start: 2022-04-18 | End: 2022-04-27 | Stop reason: HOSPADM

## 2022-04-18 RX ORDER — SODIUM CHLORIDE 0.9 % (FLUSH) 0.9 %
5-40 SYRINGE (ML) INJECTION EVERY 12 HOURS SCHEDULED
Status: DISCONTINUED | OUTPATIENT
Start: 2022-04-18 | End: 2022-04-27 | Stop reason: HOSPADM

## 2022-04-18 RX ORDER — ACETAMINOPHEN 325 MG/1
650 TABLET ORAL EVERY 6 HOURS PRN
Status: DISCONTINUED | OUTPATIENT
Start: 2022-04-18 | End: 2022-04-27 | Stop reason: HOSPADM

## 2022-04-18 RX ORDER — SODIUM CHLORIDE 9 MG/ML
INJECTION, SOLUTION INTRAVENOUS PRN
Status: DISCONTINUED | OUTPATIENT
Start: 2022-04-18 | End: 2022-04-27 | Stop reason: HOSPADM

## 2022-04-18 RX ORDER — HYDROCODONE BITARTRATE AND ACETAMINOPHEN 5; 325 MG/1; MG/1
2 TABLET ORAL EVERY 4 HOURS PRN
Status: DISCONTINUED | OUTPATIENT
Start: 2022-04-18 | End: 2022-04-27 | Stop reason: HOSPADM

## 2022-04-18 RX ORDER — ONDANSETRON 4 MG/1
4 TABLET, ORALLY DISINTEGRATING ORAL EVERY 8 HOURS PRN
Status: DISCONTINUED | OUTPATIENT
Start: 2022-04-18 | End: 2022-04-27 | Stop reason: HOSPADM

## 2022-04-18 RX ORDER — POLYETHYLENE GLYCOL 3350 17 G/17G
17 POWDER, FOR SOLUTION ORAL DAILY PRN
Status: DISCONTINUED | OUTPATIENT
Start: 2022-04-18 | End: 2022-04-27 | Stop reason: HOSPADM

## 2022-04-18 RX ADMIN — SENNOSIDES AND DOCUSATE SODIUM 1 TABLET: 50; 8.6 TABLET ORAL at 23:52

## 2022-04-18 RX ADMIN — INSULIN LISPRO 1 UNITS: 100 INJECTION, SOLUTION INTRAVENOUS; SUBCUTANEOUS at 23:52

## 2022-04-18 RX ADMIN — ASPIRIN 81 MG: 81 TABLET, COATED ORAL at 20:04

## 2022-04-18 RX ADMIN — HYDROCODONE BITARTRATE AND ACETAMINOPHEN 2 TABLET: 5; 325 TABLET ORAL at 19:57

## 2022-04-18 RX ADMIN — ACETAMINOPHEN 650 MG: 325 TABLET ORAL at 12:45

## 2022-04-18 RX ADMIN — PIPERACILLIN AND TAZOBACTAM 3375 MG: 3; .375 INJECTION, POWDER, LYOPHILIZED, FOR SOLUTION INTRAVENOUS at 18:19

## 2022-04-18 RX ADMIN — ATORVASTATIN CALCIUM 40 MG: 40 TABLET, FILM COATED ORAL at 23:27

## 2022-04-18 RX ADMIN — SODIUM CHLORIDE 1000 ML: 9 INJECTION, SOLUTION INTRAVENOUS at 18:15

## 2022-04-18 RX ADMIN — ENOXAPARIN SODIUM 80 MG: 100 INJECTION SUBCUTANEOUS at 23:27

## 2022-04-18 RX ADMIN — SODIUM CHLORIDE 1000 ML: 9 INJECTION, SOLUTION INTRAVENOUS at 12:50

## 2022-04-18 RX ADMIN — SODIUM CHLORIDE: 9 INJECTION, SOLUTION INTRAVENOUS at 15:00

## 2022-04-18 RX ADMIN — VANCOMYCIN HYDROCHLORIDE 1750 MG: 5 INJECTION, POWDER, LYOPHILIZED, FOR SOLUTION INTRAVENOUS at 14:22

## 2022-04-18 RX ADMIN — INSULIN GLARGINE 19 UNITS: 100 INJECTION, SOLUTION SUBCUTANEOUS at 23:53

## 2022-04-18 RX ADMIN — SODIUM CHLORIDE: 9 INJECTION, SOLUTION INTRAVENOUS at 15:07

## 2022-04-18 RX ADMIN — PIPERACILLIN AND TAZOBACTAM 3375 MG: 3; .375 INJECTION, POWDER, LYOPHILIZED, FOR SOLUTION INTRAVENOUS at 23:29

## 2022-04-18 RX ADMIN — ENOXAPARIN SODIUM 80 MG: 100 INJECTION SUBCUTANEOUS at 15:10

## 2022-04-18 RX ADMIN — METOPROLOL SUCCINATE 25 MG: 25 TABLET, EXTENDED RELEASE ORAL at 20:04

## 2022-04-18 ASSESSMENT — ENCOUNTER SYMPTOMS
COUGH: 1
COUGH: 0
CHEST TIGHTNESS: 0
NAUSEA: 0
VOMITING: 0
DIARRHEA: 0
SHORTNESS OF BREATH: 0
SORE THROAT: 0
SHORTNESS OF BREATH: 1
ABDOMINAL PAIN: 0
EYES NEGATIVE: 1
CONSTIPATION: 0

## 2022-04-18 ASSESSMENT — PAIN DESCRIPTION - LOCATION: LOCATION: FOOT

## 2022-04-18 ASSESSMENT — PAIN DESCRIPTION - FREQUENCY: FREQUENCY: CONTINUOUS

## 2022-04-18 ASSESSMENT — PAIN SCALES - GENERAL
PAINLEVEL_OUTOF10: 8
PAINLEVEL_OUTOF10: 5
PAINLEVEL_OUTOF10: 7

## 2022-04-18 ASSESSMENT — PAIN DESCRIPTION - PAIN TYPE: TYPE: ACUTE PAIN

## 2022-04-18 ASSESSMENT — PAIN DESCRIPTION - PROGRESSION: CLINICAL_PROGRESSION: GRADUALLY IMPROVING

## 2022-04-18 ASSESSMENT — PAIN - FUNCTIONAL ASSESSMENT: PAIN_FUNCTIONAL_ASSESSMENT: PREVENTS OR INTERFERES SOME ACTIVE ACTIVITIES AND ADLS

## 2022-04-18 ASSESSMENT — PAIN DESCRIPTION - ONSET: ONSET: ON-GOING

## 2022-04-18 ASSESSMENT — PAIN DESCRIPTION - ORIENTATION: ORIENTATION: LEFT

## 2022-04-18 ASSESSMENT — PAIN DESCRIPTION - DESCRIPTORS: DESCRIPTORS: ACHING;BURNING;CONSTANT

## 2022-04-18 NOTE — CONSULTS
[facility] [id number]  Phone: [phone]    Fax [phone]                  Michelle Byrnes MD, Aden Dave MD, [address], MD, Eulas Hodgkin, MD Mabel Runner, MD Jacquelyn Armor, MD Daphne Hatchet, MD Almond Pin, APRASIYA Batres, RAFAEL Her, APRASIYA Sumner, RAFAEL Perez PA-C    CARDIOLOGY CONSULT NOTE     Reason for consultation: ED patient? Primary care physician: Issac Saldivar MD      Thank you for the consult. Chief Complaints :  Chief Complaint   Patient presents with    Wound Infection     left and right foot, swelling to left leg        History of present illness:Julien is a 39 y. o.year old  male with a past medical history of type 2 diabetes mellitus, hypertension, hyperlipidemia. Patient presents with left foot wound infection x3 to 4 months. Patient stated that he has had this wound infection for some time but has not decided to take care of it because he has been taking care of his mother following heart surgery. He has required amputation in the past and now is presenting with acute osteomyelitis of his left foot. Patient is also complaining of some dyspnea on exertion and some coughing. This has been ongoing for approximately 2 weeks. While here in the emergency department he was noted to have a left peroneal DVT. He has been fairly inactive due to his foot. He has not traveled anywhere, no history of smoking, no illicit substance use, and no history of cancer. Patient stated prior to this ulcer of his foot he was fairly active and did not have any limits in his activities of daily life. He was able to walk without any dyspnea on exertion. He is able to climb a flight of stairs.     Potassium of 4.5: Sodium of 127, creatinine of 0.5, albumin 2.9, hemoglobin of 10.9, white blood cell count of 14.7, venous duplex ultrasound revealing DVT of left peroneal vein. Past medical history:    has a past medical history of Callus of foot, Diabetic ulcer of left midfoot associated with type 2 diabetes mellitus, with necrosis of muscle ([address] Diabetic ulcer of right midfoot associated with type 2 diabetes mellitus, with fat layer exposed ([address] Dizziness, Essential hypertension, Hyperlipidemia, Lumbar radiculopathy, and Septic embolism ([address] 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 Social History:   reports that he has never smoked. He has never used smokeless tobacco. He reports current alcohol use. He reports that he does not use drugs. Family history:   Mother has coronary artery disease    No Known Allergies    0.9 % sodium chloride infusion, Continuous  vancomycin (VANCOCIN) 1,750 mg in dextrose 5 % 500 mL IVPB, Once      Current Facility-Administered Medications   Medication Dose Route Frequency Provider Last Rate Last Admin    0.9 % sodium chloride infusion   IntraVENous Continuous Theora Certain Sergei,  mL/hr at 04/18/22 1507 New Bag at 04/18/22 1507    vancomycin (VANCOCIN) 1,750 mg in dextrose 5 % 500 mL IVPB  1,750 mg IntraVENous Once Claudia Davis [facility],  mL/hr at 04/18/22 1510 Restarted at 04/18/22 1510     Current Outpatient Medications   Medication Sig Dispense Refill    losartan (COZAAR) 25 MG tablet Take 1 tablet by mouth daily 30 tablet 5    FLUoxetine (PROZAC) 20 MG capsule Take 2 capsules by mouth daily 60 capsule 5    pioglitazone (ACTOS) 30 MG tablet Take 1 tablet by mouth daily 90 tablet 1    metoprolol succinate (TOPROL XL) 25 MG extended release tablet Take 1 tablet by mouth daily 90 tablet 1    omeprazole (PRILOSEC) 20 MG delayed release capsule Take 1 capsule by mouth once daily in the morning 90 capsule 1    metFORMIN (GLUCOPHAGE) 500 MG tablet Take 2 tablets by mouth 2 times daily (with meals) Indications: Start tomorrow 03/14 360 tablet 1    atorvastatin (LIPITOR) 40 MG tablet Take 1 tablet by mouth nightly 90 tablet 1    glyBURIDE (DIABETA) 5 MG tablet Take 2 tablets by mouth 2 times daily (with meals) 360 tablet 1    escitalopram (LEXAPRO) 10 MG tablet Take 1 tablet by mouth daily 30 tablet 5    hydroCHLOROthiazide (MICROZIDE) 12.5 MG capsule Take 1 capsule by mouth daily 30 capsule 3    blood glucose monitor kit and supplies Dispense sufficient amount for indicated testing frequency plus additional to accommodate PRN testing needs. Dispense all needed supplies to include: monitor, strips, lancing device, lancets, control solutions, alcohol swabs. 1 kit 0    blood glucose monitor strips Test 2 times a day & as needed for symptoms of irregular blood glucose. Dispense sufficient amount for indicated testing frequency plus additional to accommodate PRN testing needs. 100 strip 0    FreeStyle Lancets MISC 1 each by Does not apply route daily 100 each 3    aspirin 81 MG tablet Take 81 mg by mouth daily      acetaminophen (TYLENOL) 325 MG tablet Take 2 tablets by mouth every 4 hours as needed for Pain or Fever 120 tablet 3       Review of Systems   Constitutional: Negative for diaphoresis, fatigue, fever and unexpected weight change. HENT: Negative. Eyes: Negative for visual disturbance. Respiratory: Positive for cough ( x2 weeks.) and shortness of breath ( With exertion). Negative for chest tightness. Cardiovascular: Positive for leg swelling ( Swelling of left foot). Negative for chest pain and palpitations. Gastrointestinal: Negative for abdominal pain, diarrhea and vomiting. Endocrine: Negative for cold intolerance and heat intolerance.    Musculoskeletal:        Ulcer on foot few months. Skin: Negative for pallor and rash. Neurological: Positive for dizziness and light-headedness. Negative for syncope and headaches. Hematological: Does not bruise/bleed easily. Psychiatric/Behavioral: Negative for dysphoric mood. The patient is not nervous/anxious. Physical Examination:    Vitals:    04/18/22 1037 04/18/22 1411   BP: (!) 178/86 (!) 145/85   Pulse: 130 110   Resp: 20 17   Temp: 100.6 °F (38.1 °C) 100.6 °F (38.1 °C)   TempSrc: Oral Oral   SpO2: 98% 97%   Weight:  170 lb (77.1 kg)   Height:  6' (1.829 m)       Physical Exam  Constitutional:       General: He is not in acute distress. Appearance: He is not diaphoretic. HENT:      Head: Normocephalic and atraumatic. Right Ear: External ear normal.      Left Ear: External ear normal.      Nose: Nose normal.      Mouth/Throat:      Mouth: Mucous membranes are moist.   Eyes:      Extraocular Movements: Extraocular movements intact. Comments: Pupils equal and round   Neck:      Vascular: No carotid bruit. Cardiovascular:      Rate and Rhythm: Regular rhythm. Tachycardia present. Pulses: Normal pulses. Heart sounds: S1 normal and S2 normal. No murmur heard. No friction rub. No gallop. Pulmonary:      Effort: Pulmonary effort is normal. No respiratory distress. Breath sounds: Normal breath sounds. No rales. Chest:      Chest wall: No tenderness. Abdominal:      Palpations: Abdomen is soft. Tenderness: There is no abdominal tenderness. Musculoskeletal:      Right lower leg: No edema. Left lower leg: No edema. Skin:     General: Skin is warm and dry. Capillary Refill: Capillary refill takes less than 2 seconds. Findings: Lesion ( Seeping Ulcer of left foot) present. No rash. Comments: Skin turgor brisk   Neurological:      Mental Status: He is alert and oriented to person, place, and time. Mental status is at baseline.    Psychiatric:         Behavior: Behavior is cooperative. Thought Content: Thought content normal.         Judgment: Judgment normal.        Medical decision making and Data review:    Lab Review   Recent Labs     04/18/22  1220   WBC 14.7*   HGB 10.9*   HCT 35.0*   *      Recent Labs     04/18/22  1220   *   K 4.5   CL 93*   CO2 19*   BUN 12   CREATININE <0.5*     Recent Labs     04/18/22  1220   AST 12*   ALT 8*   BILITOT 0.0   ALKPHOS 111     No results for input(s): TROPONINT in the last 72 hours. No results for input(s): PROBNP in the last 72 hours. Lab Results   Component Value Date    INR 1.27 06/12/2021    PROTIME 15.4 (H) 06/12/2021       EKG: Pending    ECHO: 3/12/2021   This is a limited echocardiogram.   Left ventricular systolic function is normal.   Ejection fraction is visually estimated at 55%. No evidence of any pericardial effusion. LHC: 3/12/2021   Procedure Summary   normal coronaries and normal LVEF      Recommendations   Optimize medications      XR FOOT LEFT (MIN 3 VIEWS): 4/18/2022    Interval fragmentation and osteopenia throughout the 1st metatarsal.  Absence or bony destruction of the proximal phalanx 1st digit with diffuse soft tissue swelling. Additional bony destruction in the proximal phalanx. Overall findings concerning for acute osteomyelitis. Correlate for any interval surgery. Bony destruction of the distal tuft, distal phalanx 2nd digit concerning for acute osteomyelitis. Questionable osteomyelitis in the 2nd metatarsal head. XR FOOT RIGHT (MIN 3 VIEWS): 4/18/2022    Chest No acute periostitis or bony destruction. Linear ulcer adjacent to the 1st metatarsal remnant. VL DUP LOWER EXTREMITY VENOUS LEFT: 4/18/2022    1. Occlusive thrombus in 1 of the peroneal veins. 2. Similar inguinal lymphadenopathy since at least 2021. All labs, medications and tests reviewed by myself including data  from outside source , patient and available family .   Continue all other medications of all above medical condition listed as is. Impression:  Principal Problem:    Acute osteomyelitis ([address]  Resolved Problems:    * No resolved hospital problems. *      Assessment and plan: 39 y. o.year old with   1. Acute venous thrombosis of left peroneal vein  -Received Lovenox 80 mg. Check echo, eval for possible right heart strain. No prior VTE  2. Acute osteomyelitis of foot  -On vancomycin and Zosyn. Per primary care. General surgery consulted  3. Type 2 diabetes mellitus  -Per primary care. 4. Hyperlipidemia  -Continue Lipitor 40 mg daily  5. Hypertension  -BP of 154/90. Continue Cozaar 25 mg daily and Toprol-XL 25 mg daily    Revised Cardiac Risk Index:   High risk type of surgery no   H/O ischemic heart disease  (h/o MI, or a positive stress test, current complaint of chest pain considered to be secondary to myocardial ischemia,  Use of nitrate therapy or ECG with pathological Q waves; do not count prior coronary revascularization procedure unless one of the other criteria for ischemic heart disease is present) no     H/O heart failure no  H/O CVA no   Patient is  able to walk up a flight of stairs or walk around the block  H/O DM treated with insulin no  Preoperative serum creatinine >2.0 mg/dl (177 micromol/L) no   Has a history of atrial fibrillation No  Has a history of VHD No  EKG does not have changes from previous EKG    The calculated rate of cardiac death, nonfatal myocardial infarction, and nonfatal cardiac arrest according to the number of predictors is; No risk factors  0.4% (95% CI: 0.1-0.8)     he is considered a moderate risk for surgery. Thank you  much for consult and giving us the opportunity in contributing in the care of this patient. Please feel free to call me for any questions. Marti Garrison PA-C, 4/18/2022 3:26 PM           CARDIOLOGY ATTENDING ADDENDUM    I have seen, spoken to and examined this patient personally, independent of the NP/PAC.  I have reviewed the hospital care given to date and reviewed all pertinent labs and imaging. I have spoken with patient, nursing staff and provided written and verbal instructions . The above note has been reviewed. I have spent substantive amount of time in formulating patient care. HPI     35-year-old male with history of diabetes hypertension hyperlipidemia is admitted with left foot wound infection for the past 3 to 4 months. Patient is admitted for osteomyelitis and is anticipating surgery. Cardiology consulted for evaluation  Patient is noted to have left peroneal DVT and started on anticoagulation         Physical Exam:    General:   Awake, alert  Head:normal  Eye:normal  Chest:   Clear to auscultation  0 Basilar crackles   Cardiovascular:  S1S2 Abdomen: soft   LE osteomyelitis       MEDICAL DECISION MAKING :     Acute venous thrombosis of left peroneal vein  On Lovenox  Acute osteomyelitis of the foot on IV antibiotics.   Anticipating surgery  Type 2 diabetes mellitus on medication  Hyperlipidemia continue with Lipitor  Hypertension: Continue with Cozaar and Toprol-XL  Cardiac risk assessment:  Patient had coronary angiogram performed in March 2021: Normal  Echocardiogram last year: Within normal limits    Patient is deemed moderate risk for low risk surgery    Dr. Ashanti Euceda MD

## 2022-04-18 NOTE — ED NOTES
Report called to Crystal Mendoza on 2E. Pt will be transported to room 2025 via cart when room is clean. Pt alert and oriented. No distress noted. No changes noted to previous patient assessment.  Report given to [facility] and care transferred at this time for remainder of care while in ED     Juan C Majano RN  04/18/22 [id number]

## 2022-04-18 NOTE — ED PROVIDER NOTES
EKG Interpretation    Interpreted by emergency department physician from April 18 at 1728    Rhythm: normal sinus   Rate: tachycardia  Axis: normal  Ectopy: none  Conduction: normal  ST Segments: no acute change  T Waves: no acute change  Q Waves: none    Clinical Impression: Sinus tachycardia with rate of 101, no ischemia or ectopy    MD Jonathan Noonan MD  04/18/22 3205

## 2022-04-18 NOTE — H&P
V2.0  History and Physical      Name:  Anibal Bernheim /Age/Sex: 1980  (39 y.o. male)   MRN & CSN:  [id number] & [id number] Encounter Date/Time: 2022 2:48 PM EDT   Location:  ED[facility]/ED-34 PCP: Sidra Gray MD       Hospital Day: 1    Assessment and Plan:   Anibal Bernheim is a 39 y.o. male with a pmh of osteomyelitis, hypertension, uncontrolled type 2 diabetes who presents with osteomyelitis of the left lower extremity x3 months      Sepsis secondary to Osteomylitis of left  lower extremity   Open wound of left  toe with complication                Admit to inpatient             X-ray ankle left 3 view with bony destruction of distal tuft, distal clinics second digit concerning for acute osteomyelitis questionable osteomyelitis in the second metatarsal head. Patient with leukocytosis, tachycardia, tachypnea, active source of infection meets criteria for sepsis. Initiated on vancomycin in ED. Add Zosyn  Consult to general surgery Dr. Irish Celestin. ED provider discussed case with Dr Anne Clements wound care  Consider CT scan if pain/swelling worsens              Blood cultures x2, wound culture, MRSA swab nares, add on procalcitonin and lactate             Received 1 L fluid in ED. complete sepsis bolus per protocol. Continue maintenance fluid at 150 cc/h. Repeat labs in a.m. Acute occlusive left peroneal deep venous thrombosis              Risk factors include prolonged immobility, infection. Seen on venous duplex LLE              Consult cardiology               Start lovenox 1 mg/kg BID  monitor renal functions     Uncontrolled Insulin dependent type 2 diabetes  Type 2 Diabetes with complication   -Stop home insulin, convert to weight-based long-acting glargine plus sliding scale with hypoglycemia protocol   -Hold home oral antihyperglycemics, check hemoglobin A1c    Hyponatremia   Fluid repletion as noted above, repeat BMP in a.m.         Essential hypertension  Hyperlipidemia   Continue home medications    Chronic Conditions: continue home medication as ordered  . All testing  and results reviewed with patient . All questions answered. Patient and family voiced understanding    This patient was seen and examined in conjunction with Dr. Mamta Vitale. He/She was agreeable with the plan and management as dictated above. Disposition:   Current Living situation: Independent  Expected Disposition: Unclear  Estimated D/C: 4 days    Diet ADULT DIET; Regular; 5 carb choices (75 gm/meal)   GI Prophylaxis  [x] PPI,  [] H2 Blocker,  [] Carafate,  [x] Diet/Tube Feeds   DVT Prophylaxis [x] Lovenox, []  Heparin, [] SCDs, [] Ambulation,  [] NOAC   Code Status Full Code   Surrogate Decision Maker/ POA          History from:     patient, electronic medical record    History of Present Illness:     Chief Complaint: Acute osteomyelitis ([facility] [address]  Dee Dee Brian is a 39 y.o. male with pmh of osteomyelitis and poorly controlled type 2 diabetes who presents with reports of left lower extremity swelling x3 months with left great toe ulcer x1 month. Patient also endorses general malaise, fatigue. Denies chest pain or shortness of breath. Reports increased brain and stiffness and swelling of lower extremity over the last month which prompted her to come to the ER today for evaluation. States he has had a history of diabetic ulcers and infections in the past and has gone home with a PICC line in the past and is familiar with the process. Denies any nausea vomiting or diarrhea but endorses a poor appetite at home. Denies any additional wounds. Review of Systems: Need 10 Elements   Review of Systems   Constitutional: Positive for fatigue. HENT: Negative for congestion and sore throat. Eyes: Negative. Respiratory: Negative for cough and shortness of breath. Cardiovascular: Positive for leg swelling. Negative for chest pain.    Gastrointestinal: Negative for abdominal pain, constipation, nausea and vomiting. Endocrine: Negative. Genitourinary: Negative for dysuria and hematuria. Musculoskeletal: Negative for neck pain. Skin: Positive for wound. Neurological: Negative for dizziness and light-headedness. All other systems reviewed and are negative. Objective: Intake/Output Summary (Last 24 hours) at 4/18/2022 1540  Last data filed at 4/18/2022 1509  Gross per 24 hour   Intake 179.78 ml   Output --   Net 179.78 ml      Vitals:   Vitals:    04/18/22 1037 04/18/22 1411   BP: (!) 178/86 (!) 145/85   Pulse: 130 110   Resp: 20 17   Temp: 100.6 °F (38.1 °C) 100.6 °F (38.1 °C)   TempSrc: Oral Oral   SpO2: 98% 97%   Weight:  170 lb (77.1 kg)   Height:  6' (1.829 m)       Medications Prior to Admission     Prior to Admission medications    Medication Sig Start Date End Date Taking?  Authorizing Provider   losartan (COZAAR) 25 MG tablet Take 1 tablet by mouth daily 2/4/22   Brissa Roach MD   FLUoxetine [facility]) 20 MG capsule Take 2 capsules by mouth daily 2/2/22   Brissa Roach MD   pioglitazone (ACTOS) 30 MG tablet Take 1 tablet by mouth daily 1/26/22 4/26/22  Brissa Roach MD   metoprolol succinate (TOPROL XL) 25 MG extended release tablet Take 1 tablet by mouth daily 1/24/22   Brissa Roach MD   omeprazole (PRILOSEC) 20 MG delayed release capsule Take 1 capsule by mouth once daily in the morning 1/24/22   Brissa Roach MD   metFORMIN (GLUCOPHAGE) 500 MG tablet Take 2 tablets by mouth 2 times daily (with meals) Indications: Start tomorrow 03/14 1/24/22 4/24/22  Brissa Roach MD   atorvastatin (LIPITOR) 40 MG tablet Take 1 tablet by mouth nightly 1/24/22 4/24/22  Brissa Roach MD   glyBURIDE (DIABETA) 5 MG tablet Take 2 tablets by mouth 2 times daily (with meals) 1/24/22 4/24/22  Brissa Roach MD   escitalopram (LEXAPRO) 10 MG tablet Take 1 tablet by mouth daily 1/24/22   Cirilo Ferguson Clara Mckenzie MD   hydroCHLOROthiazide (MICROZIDE) 12.5 MG capsule Take 1 capsule by mouth daily 1/24/22   Nuno Fonseca MD   blood glucose monitor kit and supplies Dispense sufficient amount for indicated testing frequency plus additional to accommodate PRN testing needs. Dispense all needed supplies to include: monitor, strips, lancing device, lancets, control solutions, alcohol swabs. 6/15/20   Carlitos Denise MD   blood glucose monitor strips Test 2 times a day & as needed for symptoms of irregular blood glucose. Dispense sufficient amount for indicated testing frequency plus additional to accommodate PRN testing needs. 6/15/20   Carlitos Denise MD   FreeStyle Lancets MISC 1 each by Does not apply route daily 6/15/20   Carlitos Denise MD   aspirin 81 MG tablet Take 81 mg by mouth daily    Historical Provider, MD   acetaminophen (TYLENOL) 325 MG tablet Take 2 tablets by mouth every 4 hours as needed for Pain or Fever 2/28/18   Parag Trinidad MD       Physical Exam: Need 8 Elements   Physical Exam  Vitals and nursing note reviewed. Constitutional:       General: He is not in acute distress. Appearance: Normal appearance. HENT:      Head: Normocephalic. Nose: Nose normal.      Mouth/Throat:      Pharynx: Oropharynx is clear. Eyes:      Pupils: Pupils are equal, round, and reactive to light. Cardiovascular:      Rate and Rhythm: Regular rhythm. Tachycardia present. Pulses:           Dorsalis pedis pulses are 1+ on the right side and 1+ on the left side. Posterior tibial pulses are 1+ on the right side and 1+ on the left side. Pulmonary:      Effort: Pulmonary effort is normal. No respiratory distress. Breath sounds: No wheezing or rhonchi. Abdominal:      General: Abdomen is flat. Bowel sounds are normal. There is no distension. Musculoskeletal:         General: Normal range of motion. Cervical back: Normal range of motion.         Feet:    Feet:      Left foot: Skin integrity: Ulcer and warmth present. Comments: Right foot Trans metatarsal amputation  Skin:     General: Skin is warm and dry. Capillary Refill: Capillary refill takes less than 2 seconds. Neurological:      General: No focal deficit present. Mental Status: He is alert and oriented to person, place, and time. Psychiatric:         Mood and Affect: Mood normal.            Past Medical History:   PMHx   Past Medical History:   Diagnosis Date    Callus of foot     Right foot - see's Dr. Marquis Houser Diabetic ulcer of left midfoot associated with type 2 diabetes mellitus, with necrosis of muscle ([address] 6/7/2021    Diabetic ulcer of right midfoot associated with type 2 diabetes mellitus, with fat layer exposed ([address] 8/11/2020    Dizziness     positional    Essential hypertension     Follows with PCP    Hyperlipidemia     Lumbar radiculopathy     Septic embolism ([facility] [address] 6/13/2020     PSHX:  has a past surgical history that includes Cardiac catheterization (03/2018); [place] tooth extraction; Dental surgery; pr office/outpt visit,procedure only (Left, 4/17/2018); lumbar laminectomy (2018); Toe amputation (Right, 1/8/2021); Achilles tendon surgery (Right, 1/8/2021); and hernia repair (Bilateral, 5/27/2020). Allergies: No Known Allergies  Fam HX:  family history includes Diabetes in his father and mother; Heart Attack in his mother; Heart Disease in his father and mother; Kidney Disease in his mother; Other in his mother.   Soc HX:   Social History     Socioeconomic History    Marital status: Single     Spouse name: None    Number of children: None    Years of education: None    Highest education level: None   Occupational History    None   Tobacco Use    Smoking status: Never Smoker    Smokeless tobacco: Never Used   Vaping Use    Vaping Use: Never used   Substance and Sexual Activity    Alcohol use: Yes     Comment: \"couple beers a night\"    Drug use: No    Sexual activity: Yes Partners: Female   Other Topics Concern    None   Social History Narrative    None     Social Determinants of Health     Financial Resource Strain: Low Risk     Difficulty of Paying Living Expenses: Not hard at all   Food Insecurity: No Food Insecurity    Worried About Running Out of Food in the Last Year: Never true    Margarita of Food in the Last Year: Never true   Transportation Needs:     Lack of Transportation (Medical): Not on file    Lack of Transportation (Non-Medical):  Not on file   Physical Activity:     Days of Exercise per Week: Not on file    Minutes of Exercise per Session: Not on file   Stress:     Feeling of Stress : Not on file   Social Connections:     Frequency of Communication with Friends and Family: Not on file    Frequency of Social Gatherings with Friends and Family: Not on file    Attends Judaism Services: Not on file    Active Member of [address] [place] [address] or Organizations: Not on file    Attends Club or Organization Meetings: Not on file    Marital Status: Not on file   Intimate Partner Violence:     Fear of Current or Ex-Partner: Not on file    Emotionally Abused: Not on file    Physically Abused: Not on file    Sexually Abused: Not on file   Housing Stability:     Unable to Pay for Housing in the Last Year: Not on file    Number of Places Lived in the Last Year: Not on file    Unstable Housing in the Last Year: Not on file       Medications:   Medications:    vancomycin  1,750 mg IntraVENous Once    aspirin  81 mg Oral Daily    [START ON 4/19/2022] escitalopram  10 mg Oral Daily    [START ON 4/19/2022] FLUoxetine  40 mg Oral Daily    [START ON 4/19/2022] losartan  25 mg Oral Daily    metoprolol succinate  25 mg Oral Daily    [START ON 4/19/2022] pantoprazole  40 mg Oral QAM AC    atorvastatin  40 mg Oral Nightly    sodium chloride flush  5-40 mL IntraVENous 2 times per day    piperacillin-tazobactam  3,375 mg IntraVENous Q8H    sennosides-docusate sodium  1 tablet Oral BID    enoxaparin  1 mg/kg SubCUTAneous BID    insulin glargine  0.25 Units/kg SubCUTAneous Nightly    insulin lispro  0.08 Units/kg SubCUTAneous TID WC    insulin lispro  0-12 Units SubCUTAneous TID WC    insulin lispro  0-6 Units SubCUTAneous Nightly    sodium chloride  1,000 mL IntraVENous Once      Infusions:    sodium chloride 150 mL/hr at 04/18/22 1507    sodium chloride      dextrose       PRN Meds: acetaminophen, 650 mg, Q4H PRN  sodium chloride flush, 5-40 mL, PRN  sodium chloride, , PRN  polyethylene glycol, 17 g, Daily PRN  acetaminophen, 650 mg, Q6H PRN   Or  acetaminophen, 650 mg, Q6H PRN  ondansetron, 4 mg, Q8H PRN   Or  ondansetron, 4 mg, Q6H PRN  glucose, 4 tablet, PRN  dextrose, 12.5 g, PRN  glucagon (rDNA), 1 mg, PRN  dextrose, 100 mL/hr, PRN  HYDROcodone 5 mg - acetaminophen, 1 tablet, Q4H PRN   Or  HYDROcodone 5 mg - acetaminophen, 2 tablet, Q4H PRN        Labs      CBC:   Recent Labs     04/18/22  1220   WBC 14.7*   HGB 10.9*   *     BMP:    Recent Labs     04/18/22  1220   *   K 4.5   CL 93*   CO2 19*   BUN 12   CREATININE <0.5*   GLUCOSE 15*     Hepatic:   Recent Labs     04/18/22  1220   AST 12*   ALT 8*   BILITOT 0.0   ALKPHOS 111     Lipids:   Lab Results   Component Value Date    CHOL 108 03/13/2021    CHOL 142 03/12/2020    HDL 27 03/13/2021    TRIG 178 03/13/2021     Hemoglobin A1C:   Lab Results   Component Value Date    LABA1C 9.2 01/24/2022     TSH: No results found for: TSH  Troponin:   Lab Results   Component Value Date    TROPONINT <0.010 06/12/2021    TROPONINT <0.010 03/13/2021    TROPONINT 0.010 03/12/2021     Lactic Acid: No results for input(s): LACTA in the last 72 hours. BNP: No results for input(s): PROBNP in the last 72 hours.   UA:  Lab Results   Component Value Date    NITRU NEGATIVE 06/12/2021    COLORU JUAN 06/12/2021    WBCUA 1 06/12/2021    RBCUA 2 06/12/2021    MUCUS RARE 06/12/2021    TRICHOMONAS NONE SEEN 06/12/2021    BACTERIA FEW 06/12/2021    CLARITYU CLEAR 06/12/2021    SPECGRAV 1.024 06/12/2021    LEUKOCYTESUR NEGATIVE 06/12/2021    UROBILINOGEN 2.0 06/12/2021    BILIRUBINUR NEGATIVE 06/12/2021    BLOODU SMALL 06/12/2021    KETUA SMALL 06/12/2021     Urine Cultures: No results found for: LABURIN  Blood Cultures: No results found for: BC  No results found for: BLOODCULT2  Organism: No results found for: ORG    Imaging/Diagnostics Last 24 Hours   XR FOOT LEFT (MIN 3 VIEWS)    Result Date: 4/18/2022  EXAMINATION: THREE XRAY VIEWS OF THE LEFT FOOT 4/18/2022 12:04 pm COMPARISON: 06/12/2021 HISTORY: ORDERING SYSTEM PROVIDED HISTORY: left foot pain TECHNOLOGIST PROVIDED HISTORY: Reason for exam:->left foot pain Reason for Exam: wound infection FINDINGS: Extensive bony destruction has developed throughout the proximal phalanx 1st digit, 1st metatarsal head and base of the distal phalanx 1st digit. There is diffuse osteopenia and osteolysis in the 1st metatarsal extending into the base with superimposed pathologic fracture. Chronic appearing periosteal reaction is present in the 2nd metatarsal. Areas of bony fragmentation are noted in the distal phalanx 2nd digit concerning for acute osteomyelitis. Subtle areas of suspected osteolysis are noted in the 2nd metatarsal head. Remote postoperative changes from 3rd metatarsal amputation. The 4th and 5th digits are intact. Midfoot and hindfoot bones are intact. Interval fragmentation and osteopenia throughout the 1st metatarsal.  Absence or bony destruction of the proximal phalanx 1st digit with diffuse soft tissue swelling. Additional bony destruction in the proximal phalanx. Overall findings concerning for acute osteomyelitis. Correlate for any interval surgery. Bony destruction of the distal tuft, distal phalanx 2nd digit concerning for acute osteomyelitis. Questionable osteomyelitis in the 2nd metatarsal head.      XR FOOT RIGHT (MIN 3 VIEWS)    Result Date: 4/18/2022  EXAMINATION: THREE XRAY VIEWS OF THE RIGHT FOOT 4/18/2022 12:27 pm COMPARISON: 01/04/2021 HISTORY: ORDERING SYSTEM PROVIDED HISTORY: ulcer right foot TECHNOLOGIST PROVIDED HISTORY: Reason for exam:->ulcer right foot Reason for Exam: ulcer right foot FINDINGS: Status post transmetatarsal amputation across the forefoot. No radiopaque foreign body in the soft tissues. No acute fracture. No acute periostitis or bony destruction. Linear ulcer adjacent to the 1st metatarsal remnant is identified. Chest No acute periostitis or bony destruction. Linear ulcer adjacent to the 1st metatarsal remnant. VL DUP LOWER EXTREMITY VENOUS LEFT    Result Date: 4/18/2022  EXAMINATION: DUPLEX VENOUS ULTRASOUND OF THE LEFT LOWER EXTREMITY 4/18/2022 1:22 pm TECHNIQUE: Duplex ultrasound using B-mode/gray scaled imaging and Doppler spectral analysis and color flow was obtained of the deep venous structures of the left extremity. COMPARISON: 06/03/2018 HISTORY: ORDERING SYSTEM PROVIDED HISTORY: ro dvt, leg pain TECHNOLOGIST PROVIDED HISTORY: Reason for exam:->ro dvt, leg pain Reason for Exam: wound to left foot, pain to lower leg, swelling from knee down x 3 mos FINDINGS: Occlusive thrombus in 1 of the peroneal veins. The visualized veins of the left lower extremity are otherwise patent and free of echogenic thrombus. The veins demonstrate good compressibility with normal color flow study and spectral analysis. Similar inguinal lymphadenopathy since at least 2021.     1. Occlusive thrombus in 1 of the peroneal veins. 2. Similar inguinal lymphadenopathy since at least 2021. Electronically signed by RAFAEL Pennington CNP on 4/18/2022 at 3:40 PM          This dictation was created with voice recognition software. While attempts have been made to review the dictation as it is transcribed, on occasion the spoken word can be misinterpreted by the technology leading to omissions or inappropriate words, phrases or sentences. Electronically signed by RAFAEL Xavier CNP on 4/18/2022 at 3:40 PM

## 2022-04-18 NOTE — CONSULTS
[id number]  consulted by KAMI Ellington for monitoring and adjustment. Indication for treatment: Sepsis secondary to Osteomylitis of left  lower extremity   Open wound of left  toe with complication    Goal trough: [] 10-15 mcg/mL or [x] 15-20 mcg/ml  AUC/LIDIA: [] <500 or [x] 400-600    Pertinent Laboratory Values:   Temp Readings from Last 3 Encounters:   04/18/22 100.6 °F (38.1 °C) (Oral)   11/04/21 98.1 °F (36.7 °C) (Temporal)   08/26/21 97.3 °F (36.3 °C) (Temporal)     Recent Labs     04/18/22  1220   WBC 14.7*   LACTATE 1.1     Recent Labs     04/18/22  1220   BUN 12   CREATININE <0.5*     CrCl cannot be calculated (This lab value cannot be used to calculate CrCl because it is not a number: <0.5). Intake/Output Summary (Last 24 hours) at 4/18/2022 1704  Last data filed at 4/18/2022 1509  Gross per 24 hour   Intake 179.78 ml   Output --   Net 179.78 ml     Vancomycin level:   TROUGH:  No results for input(s): VANCOTROUGH in the last 72 hours. RANDOM:  No results for input(s): VANCORANDOM in the last 72 hours. Assessment:  SCr, BUN, and urine output: SCR at baseline, no UOP data  Day(s) of therapy: 1  Vancomycin concentration:   4/20 - trough @01:00    Plan:  The patient received vancomycin 1750mg ivpb x1 dose earlier today  Start vancomycin 1250mg ivpb q12h for a predicted AUC of 463 at steady state  Check the vanco trough level before the 4th dose  Due to iv line incompatibility, Zosyn adjusted to 3.375gm ivpb q6h (each dose to be infused over 30 minutes)  Pharmacy will continue to monitor patient and adjust therapy as indicated    VANCOMYCIN CONCENTRATION SCHEDULED FOR 4/20 @01:00    Thank you for the consult. Vel Ventura  [facility]  4/18/2022 5:04 PM

## 2022-04-18 NOTE — ED PROVIDER NOTES
Date    ACHILLES TENDON SURGERY Right 1/8/2021    RIGHT ACHILLES TENDON LENGTHENING REPAIR performed by Patsy Felix DPM at [address]  03/2018 [facility]    DENTAL SURGERY      teeth extractions -half per patient    HERNIA REPAIR Bilateral 5/27/2020    BIALTERAL HERNIA INGUINAL REPAIR performed by Cade Treviño MD at [place] [address]  2018    [place] OFFICE/OUTPT VISIT,PROCEDURE ONLY Left 4/17/2018    L5-S1 HEMILAMINECTOMY, REMOVAL OF DISC LEFT SIDE performed by Patt Ruvalcaba MD at [address] Right 1/8/2021    RIGHT TRANSMETATARSAL TOE AMPUTATION performed by Patsy Felix DPM at [address] EXTRACTION         CURRENT MEDICATIONS    Current Outpatient Rx   Medication Sig Dispense Refill    losartan (COZAAR) 25 MG tablet Take 1 tablet by mouth daily 30 tablet 5    FLUoxetine (PROZAC) 20 MG capsule Take 2 capsules by mouth daily 60 capsule 5    pioglitazone (ACTOS) 30 MG tablet Take 1 tablet by mouth daily 90 tablet 1    metoprolol succinate (TOPROL XL) 25 MG extended release tablet Take 1 tablet by mouth daily 90 tablet 1    omeprazole (PRILOSEC) 20 MG delayed release capsule Take 1 capsule by mouth once daily in the morning 90 capsule 1    metFORMIN (GLUCOPHAGE) 500 MG tablet Take 2 tablets by mouth 2 times daily (with meals) Indications: Start tomorrow 03/14 360 tablet 1    atorvastatin (LIPITOR) 40 MG tablet Take 1 tablet by mouth nightly 90 tablet 1    glyBURIDE (DIABETA) 5 MG tablet Take 2 tablets by mouth 2 times daily (with meals) 360 tablet 1    escitalopram (LEXAPRO) 10 MG tablet Take 1 tablet by mouth daily 30 tablet 5    hydroCHLOROthiazide (MICROZIDE) 12.5 MG capsule Take 1 capsule by mouth daily 30 capsule 3    blood glucose monitor kit and supplies Dispense sufficient amount for indicated testing frequency plus additional to accommodate PRN testing needs.  Dispense all needed supplies to include: monitor, strips, lancing device, lancets, control solutions, alcohol swabs. 1 kit 0    blood glucose monitor strips Test 2 times a day & as needed for symptoms of irregular blood glucose. Dispense sufficient amount for indicated testing frequency plus additional to accommodate PRN testing needs. 100 strip 0    FreeStyle Lancets MISC 1 each by Does not apply route daily 100 each 3    aspirin 81 MG tablet Take 81 mg by mouth daily      acetaminophen (TYLENOL) 325 MG tablet Take 2 tablets by mouth every 4 hours as needed for Pain or Fever 120 tablet 3       ALLERGIES    No Known Allergies    FAMILY HISTORY/SOCIAL HISTORY    Family History   Problem Relation Age of Onset    Heart Disease Father     Diabetes Father     Diabetes Mother     Heart Disease Mother     Other Mother     Kidney Disease Mother     Heart Attack Mother      Social History     Socioeconomic History    Marital status: Single     Spouse name: None    Number of children: None    Years of education: None    Highest education level: None   Occupational History    None   Tobacco Use    Smoking status: Never Smoker    Smokeless tobacco: Never Used   Vaping Use    Vaping Use: Never used   Substance and Sexual Activity    Alcohol use: Yes     Comment: \"couple beers a night\"    Drug use: No    Sexual activity: Yes     Partners: Female   Other Topics Concern    None   Social History Narrative    None     Social Determinants of Health     Financial Resource Strain: Low Risk     Difficulty of Paying Living Expenses: Not hard at all   Food Insecurity: No Food Insecurity    Worried About Running Out of Food in the Last Year: Never true    Margarita of Food in the Last Year: Never true   Transportation Needs:     Lack of Transportation (Medical): Not on file    Lack of Transportation (Non-Medical):  Not on file   Physical Activity:     Days of Exercise per Week: Not on file    Minutes of Exercise per Session: Not on file   Stress:     Feeling of Stress : Not on file   Social Connections:     Frequency of Communication with Friends and Family: Not on file    Frequency of Social Gatherings with Friends and Family: Not on file    Attends Gnosticist Services: Not on file    Active Member of Clubs or Organizations: Not on file    Attends Club or Organization Meetings: Not on file    Marital Status: Not on file   Intimate Partner Violence:     Fear of Current or Ex-Partner: Not on file    Emotionally Abused: Not on file    Physically Abused: Not on file    Sexually Abused: Not on file   Housing Stability:     Unable to Pay for Housing in the Last Year: Not on file    Number of [place] in the Last Year: Not on file    Unstable Housing in the Last Year: Not on file       PHYSICAL EXAM    VITAL SIGNS: BP (!) 145/85   Pulse 110   Temp 100.6 °F (38.1 °C) (Oral)   Resp 17   Ht 6' (1.829 m)   Wt 170 lb (77.1 kg)   SpO2 97%   BMI 23.06 kg/m²    Constitutional:  Well developed, Well nourished  HENT:  Atraumatic, Normocephalic, PERRL, no eye drainage noted, bilateral external ears normal, no sinus tenderness, nose normal, oropharynx moist, no pharyngeal exudates. Neck- supple. No adenopathy. Respiratory:  No retractions, lungs are clear   Cardiovascular:  Heart rate tachycardic, no JVD  GI:  Soft, No tenderness   Musculoskeletal:  No acute bony deformity, no obvious joint effusion   Integument: On inspection there is a 3 cm circular ulcer to the medial component of the left dorsum of the foot with surrounding erythema, obvious yellowish-green, malodorous discharge. There is surrounding erythema, no crepitus, no free air, compartments soft. On the right plantar aspect of his foot there is also an ulcer. Vascular: Dorsalis pedis pulses 2+ equal bilaterally  Neurologic:  Alert & oriented, no slurred speech.        LABS:  Results for orders placed or performed during the hospital encounter of 04/18/22   CBC with Auto Differential   Result Value Ref Range    WBC 14.7 (H) 4.0 - 10.5 K/CU MM    RBC 3.77 (L) 4.6 - 6.2 M/CU MM    Hemoglobin 10.9 (L) 13.5 - 18.0 GM/DL    Hematocrit 35.0 (L) 42 - 52 %    MCV 92.8 78 - 100 FL    MCH 28.9 27 - 31 PG    MCHC 31.1 (L) 32.0 - 36.0 %    RDW 13.0 11.7 - 14.9 %    Platelets 893 (H) 383 - 440 K/CU MM    MPV 9.6 7.5 - 11.1 FL    Differential Type AUTOMATED DIFFERENTIAL     Segs Relative 84.6 (H) 36 - 66 %    Lymphocytes % 7.3 (L) 24 - 44 %    Monocytes % 6.9 (H) 0 - 4 %    Eosinophils % 0.3 0 - 3 %    Basophils % 0.3 0 - 1 %    Segs Absolute 12.5 K/CU MM    Lymphocytes Absolute 1.1 K/CU MM    Monocytes Absolute 1.0 K/CU MM    Eosinophils Absolute 0.1 K/CU MM    Basophils Absolute 0.0 K/CU MM    Nucleated RBC % 0.0 %    Total Nucleated RBC 0.0 K/CU MM    Total Immature Neutrophil 0.09 K/CU MM    Immature Neutrophil % 0.6 (H) 0 - 0.43 %   Comprehensive Metabolic Panel   Result Value Ref Range    Sodium 127 (L) 135 - 145 MMOL/L    Potassium 4.5 3.5 - 5.1 MMOL/L    Chloride 93 (L) 99 - 110 mMol/L    CO2 19 (L) 21 - 32 MMOL/L    BUN 12 6 - 23 MG/DL    CREATININE <0.5 (L) 0.9 - 1.3 MG/DL    Glucose 15 (LL) 70 - 99 MG/DL    Calcium 9.2 8.3 - 10.6 MG/DL    Albumin 2.9 (L) 3.4 - 5.0 GM/DL    Total Protein 0.8 (L) 6.4 - 8.2 GM/DL    Total Bilirubin 0.0 0.0 - 1.0 MG/DL    ALT 8 (L) 10 - 40 U/L    AST 12 (L) 15 - 37 IU/L    Alkaline Phosphatase 111 40 - 129 IU/L    GFR Non- NOT CALCULATED >60 mL/min/1.73m2    GFR  NOT CALCULATED >60 mL/min/1.73m2    Anion Gap 15 4 - 16   Lactic Acid   Result Value Ref Range    Lactate 1.1 0.4 - 2.0 mMOL/L   POCT Glucose   Result Value Ref Range    POC Glucose 271 (H) 70 - 99 MG/DL         IMAGING:  XR FOOT LEFT (MIN 3 VIEWS)    Result Date: 4/18/2022  EXAMINATION: THREE XRAY VIEWS OF THE LEFT FOOT 4/18/2022 12:04 pm COMPARISON: 06/12/2021 HISTORY: ORDERING SYSTEM PROVIDED HISTORY: left foot pain TECHNOLOGIST PROVIDED HISTORY: Reason for exam:->left foot pain Reason for Exam: wound infection FINDINGS: Extensive bony destruction has developed throughout the proximal phalanx 1st digit, 1st metatarsal head and base of the distal phalanx 1st digit. There is diffuse osteopenia and osteolysis in the 1st metatarsal extending into the base with superimposed pathologic fracture. Chronic appearing periosteal reaction is present in the 2nd metatarsal. Areas of bony fragmentation are noted in the distal phalanx 2nd digit concerning for acute osteomyelitis. Subtle areas of suspected osteolysis are noted in the 2nd metatarsal head. Remote postoperative changes from 3rd metatarsal amputation. The 4th and 5th digits are intact. Midfoot and hindfoot bones are intact. Interval fragmentation and osteopenia throughout the 1st metatarsal.  Absence or bony destruction of the proximal phalanx 1st digit with diffuse soft tissue swelling. Additional bony destruction in the proximal phalanx. Overall findings concerning for acute osteomyelitis. Correlate for any interval surgery. Bony destruction of the distal tuft, distal phalanx 2nd digit concerning for acute osteomyelitis. Questionable osteomyelitis in the 2nd metatarsal head. XR FOOT RIGHT (MIN 3 VIEWS)    Result Date: 4/18/2022  EXAMINATION: THREE XRAY VIEWS OF THE RIGHT FOOT 4/18/2022 12:27 pm COMPARISON: 01/04/2021 HISTORY: ORDERING SYSTEM PROVIDED HISTORY: ulcer right foot TECHNOLOGIST PROVIDED HISTORY: Reason for exam:->ulcer right foot Reason for Exam: ulcer right foot FINDINGS: Status post transmetatarsal amputation across the forefoot. No radiopaque foreign body in the soft tissues. No acute fracture. No acute periostitis or bony destruction. Linear ulcer adjacent to the 1st metatarsal remnant is identified. Chest No acute periostitis or bony destruction. Linear ulcer adjacent to the 1st metatarsal remnant.      VL DUP LOWER EXTREMITY VENOUS LEFT    Result Date: 4/18/2022  EXAMINATION: DUPLEX VENOUS ULTRASOUND OF THE LEFT LOWER EXTREMITY 4/18/2022 1:22 pm TECHNIQUE: Duplex ultrasound using B-mode/gray scaled imaging and Doppler spectral analysis and color flow was obtained of the deep venous structures of the left extremity. COMPARISON: 06/03/2018 HISTORY: ORDERING SYSTEM PROVIDED HISTORY: ro dvt, leg pain TECHNOLOGIST PROVIDED HISTORY: Reason for exam:->ro dvt, leg pain Reason for Exam: wound to left foot, pain to lower leg, swelling from knee down x 3 mos FINDINGS: Occlusive thrombus in 1 of the peroneal veins. The visualized veins of the left lower extremity are otherwise patent and free of echogenic thrombus. The veins demonstrate good compressibility with normal color flow study and spectral analysis. Similar inguinal lymphadenopathy since at least 2021.     1. Occlusive thrombus in 1 of the peroneal veins. 2. Similar inguinal lymphadenopathy since at least 2021. ED COURSE & MEDICAL DECISION MAKING    Patient presents as above. Emerge etiologies considered. Patient seen and examined. Patient having acute osteomyelitis of the left foot, is hyperglycemic, does activate SIRS criteria septic. Will initiate broad-spectrum antibiotics, aggressive fluid resuscitation. Patient also having a below the knee DVT of the peroneal vein. Will provide a therapeutic dose of Lovenox. Will consult cardiology. Did speak with Dr. Jillian Dakins who was the general surgeon on-call. He will see the patient in consultation for the likelihood of surgical intervention of this foot ulcer and developing osteomyelitis. CRITICAL CARE NOTE:  There was a high probability of clinically significant life-threatening deterioration of the patient's condition requiring my urgent intervention due to acute osteomyelitis, acute DVT, sepsis. IV antibiotics, IV fluids, therapeutic subcutaneous Lovenox, specialty consultation, patient counseling was performed to address this. Total critical care time is  30 minutes.     This includes vital sign monitoring, pulse oximetry monitoring, telemetry monitoring, clinical response to the IV medications, reviewing the nursing notes, consultation time, dictation/documentation time, and interpretation of the lab work. This time excludes time spent performing procedures and separately billable procedures and family discussion time. All pertinent Lab data and radiographic results reviewed with patient at bedside. The patient and/or the family were informed of the results of any tests/labs/imaging, the treatment plan, and time was allotted to answer questions. Clinical  IMPRESSION    1. Acute osteomyelitis of foot ([address]    2. Acute deep vein thrombosis (DVT) of left peroneal vein (HCC)    3. Hyperglycemia      Comment: Please note this report has been produced using speech recognition software and may contain errors related to that system including errors in grammar, punctuation, and spelling, as well as words and phrases that may be inappropriate. If there are any questions or concerns please feel free to contact the dictating provider for clarification.       Claudia Davis, PA  04/18/22 [id number]

## 2022-04-18 NOTE — ED NOTES
Hadley GREER notified this RN that pt's lab glucose was 15. This RN rechecked glucose at bedside with POC. . Hadley GREER notified.       Catherine Figueroa RN  04/18/22 [id number]

## 2022-04-19 LAB
ALBUMIN SERPL-MCNC: 2.5 GM/DL (ref 3.4–5)
ALBUMIN SERPL-MCNC: 2.5 GM/DL (ref 3.4–5)
ALP BLD-CCNC: 78 IU/L (ref 40–128)
ALP BLD-CCNC: 78 IU/L (ref 40–129)
ALT SERPL-CCNC: 6 U/L (ref 10–40)
ALT SERPL-CCNC: 6 U/L (ref 10–40)
ANION GAP SERPL CALCULATED.3IONS-SCNC: 9 MMOL/L (ref 4–16)
AST SERPL-CCNC: 9 IU/L (ref 15–37)
AST SERPL-CCNC: 9 IU/L (ref 15–37)
BASOPHILS ABSOLUTE: 0.1 K/CU MM
BASOPHILS RELATIVE PERCENT: 0.5 % (ref 0–1)
BILIRUB SERPL-MCNC: 0.4 MG/DL (ref 0–1)
BILIRUB SERPL-MCNC: 0.4 MG/DL (ref 0–1)
BILIRUBIN DIRECT: 0.2 MG/DL (ref 0–0.3)
BILIRUBIN, INDIRECT: 0.2 MG/DL (ref 0–0.7)
BUN BLDV-MCNC: 9 MG/DL (ref 6–23)
CALCIUM SERPL-MCNC: 7.7 MG/DL (ref 8.3–10.6)
CHLORIDE BLD-SCNC: 100 MMOL/L (ref 99–110)
CO2: 24 MMOL/L (ref 21–32)
CREAT SERPL-MCNC: 0.8 MG/DL (ref 0.9–1.3)
DIFFERENTIAL TYPE: ABNORMAL
EKG ATRIAL RATE: 101 BPM
EKG DIAGNOSIS: NORMAL
EKG P AXIS: 83 DEGREES
EKG P-R INTERVAL: 152 MS
EKG Q-T INTERVAL: 340 MS
EKG QRS DURATION: 88 MS
EKG QTC CALCULATION (BAZETT): 440 MS
EKG R AXIS: 3 DEGREES
EKG T AXIS: -3 DEGREES
EKG VENTRICULAR RATE: 101 BPM
EOSINOPHILS ABSOLUTE: 0.3 K/CU MM
EOSINOPHILS RELATIVE PERCENT: 2.3 % (ref 0–3)
ESTIMATED AVERAGE GLUCOSE: 252 MG/DL
GFR AFRICAN AMERICAN: >60 ML/MIN/1.73M2
GFR NON-AFRICAN AMERICAN: >60 ML/MIN/1.73M2
GLUCOSE BLD-MCNC: 165 MG/DL (ref 70–99)
GLUCOSE BLD-MCNC: 191 MG/DL (ref 70–99)
GLUCOSE BLD-MCNC: 201 MG/DL (ref 70–99)
GLUCOSE BLD-MCNC: 212 MG/DL (ref 70–99)
GLUCOSE BLD-MCNC: 231 MG/DL (ref 70–99)
GLUCOSE BLD-MCNC: 72 MG/DL (ref 70–99)
GLUCOSE BLD-MCNC: 90 MG/DL (ref 70–99)
HBA1C MFR BLD: 10.4 % (ref 4.2–6.3)
HCT VFR BLD CALC: 29.3 % (ref 42–52)
HEMOGLOBIN: 8.8 GM/DL (ref 13.5–18)
IMMATURE NEUTROPHIL %: 0.4 % (ref 0–0.43)
INR BLD: 1.14 INDEX
LV EF: 55 %
LVEF MODALITY: NORMAL
LYMPHOCYTES ABSOLUTE: 2.3 K/CU MM
LYMPHOCYTES RELATIVE PERCENT: 21.1 % (ref 24–44)
MCH RBC QN AUTO: 28.9 PG (ref 27–31)
MCHC RBC AUTO-ENTMCNC: 30 % (ref 32–36)
MCV RBC AUTO: 96.1 FL (ref 78–100)
MONOCYTES ABSOLUTE: 0.9 K/CU MM
MONOCYTES RELATIVE PERCENT: 8.4 % (ref 0–4)
NUCLEATED RBC %: 0 %
PDW BLD-RTO: 13.3 % (ref 11.7–14.9)
PLATELET # BLD: 575 K/CU MM (ref 140–440)
PMV BLD AUTO: 8.8 FL (ref 7.5–11.1)
POTASSIUM SERPL-SCNC: 4.4 MMOL/L (ref 3.5–5.1)
PROCALCITONIN: 0.12
PROTHROMBIN TIME: 14.7 SECONDS (ref 11.7–14.5)
RBC # BLD: 3.05 M/CU MM (ref 4.6–6.2)
SEGMENTED NEUTROPHILS ABSOLUTE COUNT: 7.4 K/CU MM
SEGMENTED NEUTROPHILS RELATIVE PERCENT: 67.3 % (ref 36–66)
SODIUM BLD-SCNC: 133 MMOL/L (ref 135–145)
TOTAL IMMATURE NEUTOROPHIL: 0.04 K/CU MM
TOTAL NUCLEATED RBC: 0 K/CU MM
TOTAL PROTEIN: 7.1 GM/DL (ref 6.4–8.2)
TOTAL PROTEIN: 7.1 GM/DL (ref 6.4–8.2)
WBC # BLD: 10.9 K/CU MM (ref 4–10.5)

## 2022-04-19 PROCEDURE — 6370000000 HC RX 637 (ALT 250 FOR IP): Performed by: NURSE PRACTITIONER

## 2022-04-19 PROCEDURE — 80053 COMPREHEN METABOLIC PANEL: CPT

## 2022-04-19 PROCEDURE — 94761 N-INVAS EAR/PLS OXIMETRY MLT: CPT

## 2022-04-19 PROCEDURE — 82962 GLUCOSE BLOOD TEST: CPT

## 2022-04-19 PROCEDURE — 2060000000 HC ICU INTERMEDIATE R&B

## 2022-04-19 PROCEDURE — 99233 SBSQ HOSP IP/OBS HIGH 50: CPT | Performed by: INTERNAL MEDICINE

## 2022-04-19 PROCEDURE — 93306 TTE W/DOPPLER COMPLETE: CPT

## 2022-04-19 PROCEDURE — APPSS60 APP SPLIT SHARED TIME 46-60 MINUTES: Performed by: NURSE PRACTITIONER

## 2022-04-19 PROCEDURE — 99211 OFF/OP EST MAY X REQ PHY/QHP: CPT

## 2022-04-19 PROCEDURE — 2580000003 HC RX 258: Performed by: NURSE PRACTITIONER

## 2022-04-19 PROCEDURE — 6360000002 HC RX W HCPCS: Performed by: NURSE PRACTITIONER

## 2022-04-19 PROCEDURE — 87081 CULTURE SCREEN ONLY: CPT

## 2022-04-19 PROCEDURE — 80076 HEPATIC FUNCTION PANEL: CPT

## 2022-04-19 PROCEDURE — 85610 PROTHROMBIN TIME: CPT

## 2022-04-19 PROCEDURE — 93010 ELECTROCARDIOGRAM REPORT: CPT | Performed by: INTERNAL MEDICINE

## 2022-04-19 PROCEDURE — 85025 COMPLETE CBC W/AUTO DIFF WBC: CPT

## 2022-04-19 RX ORDER — INSULIN GLARGINE 100 [IU]/ML
0.25 INJECTION, SOLUTION SUBCUTANEOUS NIGHTLY
Status: DISCONTINUED | OUTPATIENT
Start: 2022-04-19 | End: 2022-04-20

## 2022-04-19 RX ORDER — LOSARTAN POTASSIUM 25 MG/1
25 TABLET ORAL ONCE
Status: COMPLETED | OUTPATIENT
Start: 2022-04-19 | End: 2022-04-19

## 2022-04-19 RX ORDER — LOSARTAN POTASSIUM 25 MG/1
50 TABLET ORAL DAILY
Status: DISCONTINUED | OUTPATIENT
Start: 2022-04-20 | End: 2022-04-27 | Stop reason: HOSPADM

## 2022-04-19 RX ADMIN — PIPERACILLIN AND TAZOBACTAM 3375 MG: 3; .375 INJECTION, POWDER, LYOPHILIZED, FOR SOLUTION INTRAVENOUS at 17:57

## 2022-04-19 RX ADMIN — HYDROCODONE BITARTRATE AND ACETAMINOPHEN 2 TABLET: 5; 325 TABLET ORAL at 00:43

## 2022-04-19 RX ADMIN — SODIUM CHLORIDE: 9 INJECTION, SOLUTION INTRAVENOUS at 22:12

## 2022-04-19 RX ADMIN — ENOXAPARIN SODIUM 90 MG: 100 INJECTION SUBCUTANEOUS at 09:30

## 2022-04-19 RX ADMIN — HYDROCODONE BITARTRATE AND ACETAMINOPHEN 1 TABLET: 5; 325 TABLET ORAL at 13:36

## 2022-04-19 RX ADMIN — PIPERACILLIN AND TAZOBACTAM 3375 MG: 3; .375 INJECTION, POWDER, LYOPHILIZED, FOR SOLUTION INTRAVENOUS at 23:41

## 2022-04-19 RX ADMIN — LOSARTAN POTASSIUM 25 MG: 25 TABLET, FILM COATED ORAL at 11:38

## 2022-04-19 RX ADMIN — FLUOXETINE 40 MG: 10 CAPSULE ORAL at 09:30

## 2022-04-19 RX ADMIN — ENOXAPARIN SODIUM 90 MG: 100 INJECTION SUBCUTANEOUS at 22:01

## 2022-04-19 RX ADMIN — ATORVASTATIN CALCIUM 40 MG: 40 TABLET, FILM COATED ORAL at 22:01

## 2022-04-19 RX ADMIN — ONDANSETRON 4 MG: 2 INJECTION INTRAMUSCULAR; INTRAVENOUS at 16:55

## 2022-04-19 RX ADMIN — PIPERACILLIN AND TAZOBACTAM 3375 MG: 3; .375 INJECTION, POWDER, LYOPHILIZED, FOR SOLUTION INTRAVENOUS at 05:14

## 2022-04-19 RX ADMIN — ESCITALOPRAM OXALATE 10 MG: 10 TABLET ORAL at 09:30

## 2022-04-19 RX ADMIN — VANCOMYCIN HYDROCHLORIDE 1250 MG: 10 INJECTION, POWDER, LYOPHILIZED, FOR SOLUTION INTRAVENOUS at 14:59

## 2022-04-19 RX ADMIN — PIPERACILLIN AND TAZOBACTAM 3375 MG: 3; .375 INJECTION, POWDER, LYOPHILIZED, FOR SOLUTION INTRAVENOUS at 11:45

## 2022-04-19 RX ADMIN — SODIUM CHLORIDE, PRESERVATIVE FREE 10 ML: 5 INJECTION INTRAVENOUS at 22:07

## 2022-04-19 RX ADMIN — SENNOSIDES AND DOCUSATE SODIUM 1 TABLET: 50; 8.6 TABLET ORAL at 22:01

## 2022-04-19 RX ADMIN — PANTOPRAZOLE SODIUM 40 MG: 40 TABLET, DELAYED RELEASE ORAL at 05:45

## 2022-04-19 RX ADMIN — METOPROLOL SUCCINATE 25 MG: 25 TABLET, EXTENDED RELEASE ORAL at 09:30

## 2022-04-19 RX ADMIN — SODIUM CHLORIDE: 9 INJECTION, SOLUTION INTRAVENOUS at 01:27

## 2022-04-19 RX ADMIN — SENNOSIDES AND DOCUSATE SODIUM 1 TABLET: 50; 8.6 TABLET ORAL at 09:30

## 2022-04-19 RX ADMIN — LOSARTAN POTASSIUM 25 MG: 25 TABLET, FILM COATED ORAL at 09:30

## 2022-04-19 RX ADMIN — ASPIRIN 81 MG: 81 TABLET, COATED ORAL at 09:30

## 2022-04-19 RX ADMIN — ONDANSETRON 4 MG: 2 INJECTION INTRAMUSCULAR; INTRAVENOUS at 22:22

## 2022-04-19 RX ADMIN — HYDROCODONE BITARTRATE AND ACETAMINOPHEN 2 TABLET: 5; 325 TABLET ORAL at 05:15

## 2022-04-19 RX ADMIN — VANCOMYCIN HYDROCHLORIDE 1250 MG: 10 INJECTION, POWDER, LYOPHILIZED, FOR SOLUTION INTRAVENOUS at 01:29

## 2022-04-19 ASSESSMENT — PAIN DESCRIPTION - PROGRESSION
CLINICAL_PROGRESSION: NOT CHANGED
CLINICAL_PROGRESSION: GRADUALLY WORSENING
CLINICAL_PROGRESSION: GRADUALLY WORSENING

## 2022-04-19 ASSESSMENT — PAIN - FUNCTIONAL ASSESSMENT
PAIN_FUNCTIONAL_ASSESSMENT: PREVENTS OR INTERFERES SOME ACTIVE ACTIVITIES AND ADLS

## 2022-04-19 ASSESSMENT — PAIN DESCRIPTION - ONSET
ONSET: ON-GOING

## 2022-04-19 ASSESSMENT — PAIN SCALES - GENERAL
PAINLEVEL_OUTOF10: 2
PAINLEVEL_OUTOF10: 0
PAINLEVEL_OUTOF10: 0
PAINLEVEL_OUTOF10: 7
PAINLEVEL_OUTOF10: 5
PAINLEVEL_OUTOF10: 0
PAINLEVEL_OUTOF10: 8
PAINLEVEL_OUTOF10: 6

## 2022-04-19 ASSESSMENT — PAIN DESCRIPTION - PAIN TYPE
TYPE: ACUTE PAIN

## 2022-04-19 ASSESSMENT — PAIN DESCRIPTION - FREQUENCY
FREQUENCY: CONTINUOUS

## 2022-04-19 ASSESSMENT — PAIN DESCRIPTION - ORIENTATION
ORIENTATION: LEFT

## 2022-04-19 ASSESSMENT — PAIN DESCRIPTION - DESCRIPTORS
DESCRIPTORS: BURNING;CONSTANT
DESCRIPTORS: ACHING;CONSTANT
DESCRIPTORS: BURNING;ACHING;CONSTANT

## 2022-04-19 ASSESSMENT — PAIN DESCRIPTION - LOCATION
LOCATION: FOOT

## 2022-04-19 NOTE — PLAN OF CARE
Nutrition Problem #1: Predicted inadequate energy intake  Intervention: Food and/or Nutrient Delivery: Continue Current Diet,Start Oral Nutrition Supplement  Nutritional

## 2022-04-19 NOTE — PROGRESS NOTES
Cardiology Progress Note     Today's Plan: Echo, increase losartan to 50 mg    Admit Date:  4/18/2022    Consult reason/ Seen today for: DVT    Subjective and  Overnight Events:  Patient reports no feeling in left lower extremity from mid calf down. Assessment / Plan / Recommendation:     1. Acute DVT of left peroneal vein: Currently on weight-based Lovenox, transition to NOAC if no surgical intervention. Echocardiogram today to rule out right heart strain. 2. Acute osteomyelitis of second metatarsal head: surgery consulted. 3. HTN: Evaded, increase losartan to 50 mg daily, continue with Toprol-XL 25 mg daily. 4. Dyslipidemia: Continue with statin. 5. Surgical clearance: Patient considered low/moderate risk for surgery      History of Presenting Illness:    Chief complain on admission : 39 y. o.year old who is admitted for  Chief Complaint   Patient presents with    Wound Infection     left and right foot, swelling to left leg        Past medical history:    has a past medical history of Callus of foot, Diabetic ulcer of left midfoot associated with type 2 diabetes mellitus, with necrosis of muscle ([address] Diabetic ulcer of right midfoot associated with type 2 diabetes mellitus, with fat layer exposed ([address] Dizziness, Essential hypertension, Hyperlipidemia, Lumbar radiculopathy, and Septic embolism ([address] Past surgical history:   has a past surgical history that includes Cardiac catheterization (03/2018); [place] tooth extraction; Dental surgery; pr office/outpt visit,procedure only (Left, 4/17/2018); lumbar laminectomy (2018); Toe amputation (Right, 1/8/2021); Achilles tendon surgery (Right, 1/8/2021); and hernia repair (Bilateral, 5/27/2020). Social History:   reports that he has never smoked. He has never used smokeless tobacco. He reports current alcohol use. He reports that he does not use drugs.   Family history:  family history includes Diabetes in his father and mother; Heart Attack in his mother; Heart Disease in his father and mother; Kidney Disease in his mother; Other in his mother. No Known Allergies    Review of Systems:  Review of Systems   Cardiovascular: Negative for chest pain, palpitations and leg swelling. Musculoskeletal: Positive for arthralgias and gait problem. Skin: Negative. Neurological: Negative for dizziness and weakness. All other systems reviewed and are negative. BP (!) 165/92   Pulse 76   Temp 98.1 °F (36.7 °C) (Oral)   Resp 21   Ht 6' (1.829 m)   Wt 190 lb 14.7 oz (86.6 kg)   SpO2 95%   BMI 25.89 kg/m²       Intake/Output Summary (Last 24 hours) at 4/19/2022 1100  Last data filed at 4/19/2022 2050  Gross per 24 hour   Intake 279.78 ml   Output 1175 ml   Net -895.22 ml       Physical Exam:  Physical Exam  Constitutional:       Appearance: He is well-developed. Cardiovascular:      Rate and Rhythm: Normal rate and regular rhythm. Pulses: Intact distal pulses. Dorsalis pedis pulses are 1+ on the right side and 1+ on the left side. Posterior tibial pulses are 1+ on the right side and 1+ on the left side. Heart sounds: Normal heart sounds, S1 normal and S2 normal.   Pulmonary:      Effort: Pulmonary effort is normal.      Breath sounds: Normal breath sounds. Musculoskeletal:      Right Lower Extremity: (phalanges amputated)     Left Lower Extremity: (phalanges amputated)  Skin:     General: Skin is warm and dry. Neurological:      Mental Status: He is alert and oriented to person, place, and time.           Medications:    enoxaparin  1 mg/kg SubCUTAneous BID    insulin glargine  0.25 Units/kg SubCUTAneous Nightly    insulin lispro  0.08 Units/kg SubCUTAneous TID WC    aspirin  81 mg Oral Daily    escitalopram  10 mg Oral Daily    FLUoxetine  40 mg Oral Daily    losartan  25 mg Oral Daily    metoprolol succinate  25 mg Oral Daily  pantoprazole  40 mg Oral QAM AC    atorvastatin  40 mg Oral Nightly    sodium chloride flush  5-40 mL IntraVENous 2 times per day    sennosides-docusate sodium  1 tablet Oral BID    insulin lispro  0-12 Units SubCUTAneous TID WC    insulin lispro  0-6 Units SubCUTAneous Nightly    vancomycin  1,250 mg IntraVENous Q12H    piperacillin-tazobactam  3,375 mg IntraVENous Q6H      sodium chloride 150 mL/hr at 04/19/22 0127    sodium chloride      dextrose       sodium chloride flush, sodium chloride, polyethylene glycol, acetaminophen **OR** acetaminophen, ondansetron **OR** ondansetron, glucose, dextrose, glucagon (rDNA), dextrose, HYDROcodone 5 mg - acetaminophen **OR** HYDROcodone 5 mg - acetaminophen    Lab Data:  CBC:   Recent Labs     04/18/22  1220   WBC 14.7*   HGB 10.9*   HCT 35.0*   MCV 92.8   *     BMP:   Recent Labs     04/18/22  1220 04/19/22  0614   * 133*   K 4.5 4.4   CL 93* 100   CO2 19* 24   BUN 12 9   CREATININE <0.5* 0.8*     PT/INR:   Recent Labs     04/19/22  0614   PROTIME 14.7*   INR 1.14     BNP:  No results for input(s): PROBNP in the last 72 hours. TROPONIN: No results for input(s): TROPONINT in the last 72 hours. Impression:  Principal Problem:    Acute osteomyelitis ([facility] [address]  Resolved Problems:    * No resolved hospital problems. *       All labs, medications and tests reviewed by myself, continue all other medications of all above medical condition listed as is except for changes mentioned above. Thank you   Please call with questions. Electronically signed by Carol Dewitt. RAFAEL Schaeffer CNP on 4/19/2022 at 11:00 AM         CARDIOLOGY ATTENDING ADDENDUM    I have seen, spoken to and examined this patient personally, independent of the NP/PAC. I have reviewed the hospital care given to date and reviewed all pertinent labs and imaging. I have spoken with patient, nursing staff and provided written and verbal instructions . The above note has been reviewed.      I have spent substantive amount of time in formulating patient care. Physical Exam:    General:   Awake, alert  Head:normal  Eye:normal  Chest:   Clear to auscultation   0 Basilar crackles   Cardiovascular:  S1S2           MEDICAL DECISION MAKING :          Acute venous thrombosis of left peroneal vein  On Lovenox  Acute osteomyelitis of the foot on IV antibiotics.   Anticipating surgery  Type 2 diabetes mellitus on medication  Hyperlipidemia continue with Lipitor  Hypertension: Continue with Cozaar and Toprol-XL  Increase losartan  Cardiac risk assessment:  Patient had coronary angiogram performed in March 2021: Normal  Echocardiogram last year: Within normal limits     Patient is deemed moderate risk for low risk surgery             Dr. Deja Borja MD

## 2022-04-19 NOTE — CONSULTS
[facility] Continence Nurse  Consult Note       Debbie Villegas  AGE: 39 y.o.    GENDER: male  : 1980  TODAY'S DATE:  2022    Subjective:     Reason for CWOCN Evaluation and Assessment: wound assessment      Debbie Villegas is a 39 y.o. male referred by:   [x] Physician  [] Nursing  [] Other:     Wound Identification:  Wound Type: diabetic  Contributing Factors: diabetes, poor glucose control, chronic pressure and non-adherence        PAST MEDICAL HISTORY        Diagnosis Date    Callus of foot     Right foot - see's Dr. Emmett Kanner Diabetic ulcer of left midfoot associated with type 2 diabetes mellitus, with necrosis of muscle ([facility] 2021    Diabetic ulcer of right midfoot associated with type 2 diabetes mellitus, with fat layer exposed ([facility] 2020    Dizziness     positional    Essential hypertension     Follows with PCP    Hyperlipidemia     Lumbar radiculopathy     Septic embolism ([facility] 2020       PAST SURGICAL HISTORY    Past Surgical History:   Procedure Laterality Date    ACHILLES TENDON SURGERY Right 2021    RIGHT ACHILLES TENDON LENGTHENING REPAIR performed by Edie Aguila DPM at [address] [facility]  2018    [facility]    DENTAL SURGERY      teeth extractions -half per patient    HERNIA REPAIR Bilateral 2020    BIALTERAL HERNIA INGUINAL REPAIR performed by Leopold Cordon, MD at [address] [place] [address]  2018    MS OFFICE/OUTPT VISIT,PROCEDURE ONLY Left 2018    L5-S1 HEMILAMINECTOMY, REMOVAL OF [place] LEFT SIDE performed by Blossom Murillo MD at [address] Right 2021    RIGHT TRANSMETATARSAL TOE AMPUTATION performed by Edie Aguila DPM at [facility] HISTORY    Family History   Problem Relation Age of Onset    Heart Disease Father     Diabetes Father     Diabetes Mother     Heart Disease Mother     Other Mother     Kidney Disease Mother    Alayna Me Heart Attack Mother        SOCIAL HISTORY    Social History     Tobacco Use    Smoking status: Never Smoker    Smokeless tobacco: Never Used   Vaping Use    Vaping Use: Never used   Substance Use Topics    Alcohol use: Yes     Comment: \"couple beers a night\"    Drug use: No       ALLERGIES    No Known Allergies    MEDICATIONS    No current facility-administered medications on file prior to encounter. Current Outpatient Medications on File Prior to Encounter   Medication Sig Dispense Refill    losartan (COZAAR) 25 MG tablet Take 1 tablet by mouth daily 30 tablet 5    FLUoxetine (PROZAC) 20 MG capsule Take 2 capsules by mouth daily 60 capsule 5    pioglitazone (ACTOS) 30 MG tablet Take 1 tablet by mouth daily 90 tablet 1    metoprolol succinate (TOPROL XL) 25 MG extended release tablet Take 1 tablet by mouth daily 90 tablet 1    omeprazole (PRILOSEC) 20 MG delayed release capsule Take 1 capsule by mouth once daily in the morning 90 capsule 1    metFORMIN (GLUCOPHAGE) 500 MG tablet Take 2 tablets by mouth 2 times daily (with meals) Indications: Start tomorrow 03/14 360 tablet 1    atorvastatin (LIPITOR) 40 MG tablet Take 1 tablet by mouth nightly 90 tablet 1    glyBURIDE (DIABETA) 5 MG tablet Take 2 tablets by mouth 2 times daily (with meals) 360 tablet 1    escitalopram (LEXAPRO) 10 MG tablet Take 1 tablet by mouth daily 30 tablet 5    hydroCHLOROthiazide (MICROZIDE) 12.5 MG capsule Take 1 capsule by mouth daily 30 capsule 3    blood glucose monitor kit and supplies Dispense sufficient amount for indicated testing frequency plus additional to accommodate PRN testing needs. Dispense all needed supplies to include: monitor, strips, lancing device, lancets, control solutions, alcohol swabs. 1 kit 0    blood glucose monitor strips Test 2 times a day & as needed for symptoms of irregular blood glucose.  Dispense sufficient amount for indicated testing frequency plus additional to accommodate PRN testing needs. 100 strip 0    FreeStyle Lancets MISC 1 each by Does not apply route daily 100 each 3    aspirin 81 MG tablet Take 81 mg by mouth daily      acetaminophen (TYLENOL) 325 MG tablet Take 2 tablets by mouth every 4 hours as needed for Pain or Fever 120 tablet 3         Objective:      BP (!) 165/92   Pulse 76   Temp 98.1 °F (36.7 °C) (Oral)   Resp 21   Ht 6' (1.829 m)   Wt 190 lb 14.7 oz (86.6 kg)   SpO2 95%   BMI 25.89 kg/m²   Heraclio Risk Score: Heraclio Scale Score: 19    LABS    CBC:   Lab Results   Component Value Date    WBC 14.7 04/18/2022    RBC 3.77 04/18/2022    HGB 10.9 04/18/2022    HCT 35.0 04/18/2022    MCV 92.8 04/18/2022    MCH 28.9 04/18/2022    MCHC 31.1 04/18/2022    RDW 13.0 04/18/2022     04/18/2022    MPV 9.6 04/18/2022     CMP:    Lab Results   Component Value Date     04/19/2022    K 4.4 04/19/2022    K 3.8 03/14/2018     04/19/2022    CO2 24 04/19/2022    BUN 9 04/19/2022    CREATININE 0.8 04/19/2022    GFRAA >60 04/19/2022    AGRATIO 1.4 03/12/2020    LABGLOM >60 04/19/2022    GLUCOSE 191 04/19/2022    PROT 7.1 04/19/2022    PROT 7.1 04/19/2022    LABALBU 2.5 04/19/2022    LABALBU 2.5 04/19/2022    CALCIUM 7.7 04/19/2022    BILITOT 0.4 04/19/2022    BILITOT 0.4 04/19/2022    ALKPHOS 78 04/19/2022    ALKPHOS 78 04/19/2022    AST 9 04/19/2022    AST 9 04/19/2022    ALT 6 04/19/2022    ALT 6 04/19/2022     Albumin:    Lab Results   Component Value Date    LABALBU 2.5 04/19/2022    LABALBU 2.5 04/19/2022     PT/INR:    Lab Results   Component Value Date    PROTIME 14.7 04/19/2022    INR 1.14 04/19/2022     HgBA1c:    Lab Results   Component Value Date    LABA1C 10.4 04/18/2022         Assessment:     Patient Active Problem List   Diagnosis    Unstable angina (HCC)    Lumbar back pain with radiculopathy affecting left lower extremity    S/P lumbar laminectomy    Type 2 diabetes mellitus with diabetic neuropathy, without long-term current use of insulin ([facility]    Essential hypertension    Gastroesophageal reflux disease without esophagitis    WD-Cellulitis of foot right    Chest pain    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)    WD-Diabetic ulcer of toe of left foot associated with type 2 diabetes mellitus, with necrosis of muscle ([address]    WD-Diabetic ulcer of right midfoot associated with type 2 diabetes mellitus, with fat layer exposed ([address]    Diabetic foot ([address]    Moderate nonproliferative diabetic retinopathy of both eyes without macular edema associated with type 2 diabetes mellitus ([address]    Acute osteomyelitis ([address]    Acute osteomyelitis of foot ([address]       Measurements:  Incision 04/17/18 Back (Active)   Number of days: 1463       Wound 06/13/20 Tibial Right pt states reddened and rash like after a sunburn 6/11/2020 (Active)   Number of days: 674       Wound 04/18/22 Foot Left;Dorsal (Active)   Wound Image   04/19/22 0900   Wound Etiology Diabetic 04/19/22 0900   Dressing Status New dressing applied 04/19/22 0900   Wound Cleansed Cleansed with saline 04/19/22 0900   Dressing/Treatment ABD;Hydrofiber Ag;Roll gauze 04/19/22 0900   Wound Length (cm) 2 cm 04/19/22 0900   Wound Width (cm) 2 cm 04/19/22 0900   Wound Depth (cm) 0.3 cm 04/19/22 0900   Wound Surface Area (cm^2) 4 cm^2 04/19/22 0900   Wound Volume (cm^3) 1.2 cm^3 04/19/22 0900   Distance Tunneling (cm) 0 cm 04/19/22 0900   Tunneling Position ___ O'Clock 0 04/19/22 0900   Undermining Starts ___ O'Clock 0 04/19/22 0900   Undermining Ends___ O'Clock 0 04/19/22 0900   Undermining [place] Distance (cm) 0 04/19/22 0900   Wound Assessment [place]/red;Slough 04/19/22 0900   Drainage Amount Moderate 04/19/22 0900   Drainage Description Serosanguinous; Yellow 04/19/22 0900   Odor Mild 04/19/22 0900   Rosalie-wound Assessment Edematous; Warm 04/19/22 0900   Margins Defined edges 04/19/22 0900   Wound Thickness Description not for Pressure Injury Full thickness 04/19/22 0900   Number of days: 0       Wound 04/18/22 Foot Right;Plantar (Active)   Wound Image   04/19/22 0900   Wound Etiology Diabetic 04/19/22 0900   Dressing Status New dressing applied 04/19/22 0900   Wound Cleansed Cleansed with saline 04/19/22 0900   Dressing/Treatment Collagen; Hydrating gel;Silicone border 85/43/19 0900   Wound Length (cm) 1.4 cm 04/19/22 0900   Wound Width (cm) 0.5 cm 04/19/22 0900   Wound Depth (cm) 0.5 cm 04/19/22 0900   Wound Surface Area (cm^2) 0.7 cm^2 04/19/22 0900   Wound Volume (cm^3) 0.35 cm^3 04/19/22 0900   Distance Tunneling (cm) 0 cm 04/19/22 0900   Tunneling Position ___ O'Clock 0 04/19/22 0900   Undermining Starts ___ O'Clock 0 04/19/22 0900   Undermining Ends___ O'Clock 0 04/19/22 0900   Undermining [place] Distance (cm) 0 04/19/22 0900   Wound Assessment Pink/red 04/19/22 0900   Drainage Amount Scant 04/19/22 0900   Drainage Description Serosanguinous 04/19/22 0900   Odor None 04/19/22 0900   Rosalie-wound Assessment Hyperkeratosis (callous) 04/19/22 0900   Margins Defined edges 04/19/22 0900   Wound Thickness Description not for Pressure Injury Full thickness 04/19/22 0900   Number of days: 0       Wound 04/19/22 Foot Left;Plantar;Lateral cluster (Active)   Wound Image   04/19/22 0900   Wound Etiology Diabetic 04/19/22 0900   Dressing Status New dressing applied 04/19/22 0900   Wound Cleansed Cleansed with saline 04/19/22 0900   Dressing/Treatment ABD;Hydrofiber Ag;Roll gauze 04/19/22 0900   Wound Length (cm) 6 cm 04/19/22 0900   Wound Width (cm) 12 cm 04/19/22 0900   Wound Depth (cm) 0.2 cm 04/19/22 0900   Wound Surface Area (cm^2) 72 cm^2 04/19/22 0900   Wound Volume (cm^3) 14.4 cm^3 04/19/22 0900   Distance Tunneling (cm) 0 cm 04/19/22 0900   Tunneling Position ___ O'Clock 0 04/19/22 0900   Undermining Starts ___ O'Clock 0 04/19/22 0900   Undermining Ends___ O'Clock 0 04/19/22 0900   Undermining Max[place] Distance (cm) 0 04/19/22 0900   Wound Assessment [place]/red;Slough 04/19/22 0900   Drainage Amount Moderate 04/19/22 0900   Drainage Description Serosanguinous; Yellow 04/19/22 0900   Odor Mild 04/19/22 0900   Rosalie-wound Assessment Edematous;Fragile; Warm 04/19/22 0900   Margins Defined edges; Attached edges 04/19/22 0900   Wound Thickness Description not for Pressure Injury Full thickness 04/19/22 0900   Number of days: 0       Response to treatment:  Well tolerated by patient. Pain Assessment:  Severity:  none  Quality of pain: na  Wound Pain Timing/Severity: na  Premedicated: no    Plan:     Plan of Care: Wound 04/18/22 Foot Left;Dorsal-Dressing/Treatment: ABD,Hydrofiber Ag,Roll gauze  Wound 04/18/22 Foot Right;Plantar-Dressing/Treatment: Collagen,Hydrating gel,Silicone border  Wound 04/19/22 Foot Left;Plantar;Lateral cluster-Dressing/Treatment: ABD,Hydrofiber Ag,Roll gauze     Pt in bed. Agreeable to wound assessment. Stated has had wounds to feet for at least 3 months with swelling left foot for about a month. Was active with outpatient wound clinic but last visit was 11/4/21. Unclear why did not return. Admitted to osteomylitis left foot. General surgery is already consulted. Awaiting to see. Left foot with diabetic wounds noted to dorsal and plantar foot with surrounding edema, warm, and discoloration. Blistering areas noted to medial foot as well. Foul odor noted. Aquacel AG, abd, kerlix recommended and applied. Will await surgeon recommendations. Right plantar foot with diabetic wound with surrounding callous. Appears clean. Prior right transmetatarsal amputation. Collagen, hydrogel and silicone foam border applied. Pictures and measurements were taken. Pt is generally not at risk for skin breakdown AEB Heraclio.      Specialty Bed Required : no  [] Low Air Loss   [] Pressure Redistribution  [] Fluid Immersion  [] Bariatric  [] Total Pressure Relief  [] Other:     Discharge Plan:  Placement for patient upon discharge: tbd  Hospice Care: no  Patient appropriate for Outpatient [address]: yes-referral sent    Patient/Caregiver Teaching:  Level of patient/caregiver understanding able to:   Voiced understanding.         Electronically signed by Margoth Turner RN, Laura Johnson on 4/19/2022 at 11:19 AM

## 2022-04-19 NOTE — PROGRESS NOTES
Nutrition Assessment     Type and Reason for Visit: Initial,Positive Nutrition Screen,Wound,Consult    Nutrition Recommendations/Plan:   1. Continue CHO Controlled diet   2. Add high protein, low calorie nutrition supplement. Malnutrition Assessment:  Malnutrition Status: Insufficient data    Nutrition Assessment:  Pt admitted with infect diabetic wounds, 3 wounds documented. Pt also endorses poor appetite PTA per H&P. Pt currently on CHO controlled diet; no PO intake has been recorded yet. Will start nutrition supplement with added protein to assist with wound healing. Pt at moderate nutrition risk. Estimated Daily Nutrient Needs:  Energy (kcal):  5052-4389 Weight Used for Energy Requirements: Admission     Protein (g):   Weight Used for Protein Requirements: Admission        Fluid (ml/day):  8861-8901 Method Used for Fluid Requirements: 1 ml/kcal    Nutrition Related Findings:   A1C 10.4, Glucose values ranging from  Wound Type: Multiple,Diabetic Ulcer    Current Nutrition Therapies:    ADULT DIET;  Regular; 5 carb choices (75 gm/meal)    Anthropometric Measures:  · Height: 6' (182.9 cm)  · Current Body Wt: 190 lb 14.7 oz (86.6 kg)   · BMI: 25.9    Nutrition Diagnosis:   · Predicted inadequate energy intake related to acute injury/trauma,endocrine dysfuntion as evidenced by poor intake prior to admission,wounds,lab values      Nutrition Interventions:   Food and/or Nutrient Delivery: Continue Current Diet,Start Oral Nutrition Supplement  Nutrition Education/Counseling: Education needed  Coordination of Nutrition Care: Continue to monitor while inpatient       Goals:     Goals: Meet at least 75% of estimated needs,PO intake 75% or greater,by next RD assessment       Nutrition Monitoring and Evaluation:   Behavioral-Environmental Outcomes: None Identified  Food/Nutrient Intake Outcomes: Food and Nutrient Intake,Supplement Intake  Physical Signs/Symptoms Outcomes: Biochemical Data,Weight    Discharge Planning:     Too soon to determine     Kurt Cancel, RD, LD  Contact: [phone]

## 2022-04-20 LAB
BASOPHILS ABSOLUTE: 0 K/CU MM
BASOPHILS RELATIVE PERCENT: 0.2 % (ref 0–1)
CREAT SERPL-MCNC: 0.6 MG/DL (ref 0.9–1.3)
DIFFERENTIAL TYPE: ABNORMAL
DOSE AMOUNT: NORMAL
DOSE TIME: NORMAL
EOSINOPHILS ABSOLUTE: 0.2 K/CU MM
EOSINOPHILS RELATIVE PERCENT: 1.8 % (ref 0–3)
GFR AFRICAN AMERICAN: >60 ML/MIN/1.73M2
GFR NON-AFRICAN AMERICAN: >60 ML/MIN/1.73M2
GLUCOSE BLD-MCNC: 114 MG/DL (ref 70–99)
GLUCOSE BLD-MCNC: 116 MG/DL (ref 70–99)
GLUCOSE BLD-MCNC: 123 MG/DL (ref 70–99)
GLUCOSE BLD-MCNC: 125 MG/DL (ref 70–99)
GLUCOSE BLD-MCNC: 139 MG/DL (ref 70–99)
GLUCOSE BLD-MCNC: 150 MG/DL (ref 70–99)
GLUCOSE BLD-MCNC: 265 MG/DL (ref 70–99)
GLUCOSE BLD-MCNC: 88 MG/DL (ref 70–99)
HCT VFR BLD CALC: 31.7 % (ref 42–52)
HEMOGLOBIN: 9.8 GM/DL (ref 13.5–18)
IMMATURE NEUTROPHIL %: 0.6 % (ref 0–0.43)
LACTATE: 0.8 MMOL/L (ref 0.4–2)
LYMPHOCYTES ABSOLUTE: 1.6 K/CU MM
LYMPHOCYTES RELATIVE PERCENT: 16.4 % (ref 24–44)
MCH RBC QN AUTO: 28.4 PG (ref 27–31)
MCHC RBC AUTO-ENTMCNC: 30.9 % (ref 32–36)
MCV RBC AUTO: 91.9 FL (ref 78–100)
MONOCYTES ABSOLUTE: 0.5 K/CU MM
MONOCYTES RELATIVE PERCENT: 5.5 % (ref 0–4)
NUCLEATED RBC %: 0 %
PDW BLD-RTO: 13 % (ref 11.7–14.9)
PLATELET # BLD: 636 K/CU MM (ref 140–440)
PMV BLD AUTO: 8.6 FL (ref 7.5–11.1)
RBC # BLD: 3.45 M/CU MM (ref 4.6–6.2)
SEGMENTED NEUTROPHILS ABSOLUTE COUNT: 7.2 K/CU MM
SEGMENTED NEUTROPHILS RELATIVE PERCENT: 75.5 % (ref 36–66)
TOTAL IMMATURE NEUTOROPHIL: 0.06 K/CU MM
TOTAL NUCLEATED RBC: 0 K/CU MM
VANCOMYCIN TROUGH: 13.6 UG/ML (ref 10–20)
WBC # BLD: 9.5 K/CU MM (ref 4–10.5)

## 2022-04-20 PROCEDURE — 2580000003 HC RX 258: Performed by: NURSE PRACTITIONER

## 2022-04-20 PROCEDURE — 6360000002 HC RX W HCPCS: Performed by: NURSE PRACTITIONER

## 2022-04-20 PROCEDURE — 83605 ASSAY OF LACTIC ACID: CPT

## 2022-04-20 PROCEDURE — 99233 SBSQ HOSP IP/OBS HIGH 50: CPT | Performed by: INTERNAL MEDICINE

## 2022-04-20 PROCEDURE — 6370000000 HC RX 637 (ALT 250 FOR IP): Performed by: INTERNAL MEDICINE

## 2022-04-20 PROCEDURE — 2060000000 HC ICU INTERMEDIATE R&B

## 2022-04-20 PROCEDURE — 6370000000 HC RX 637 (ALT 250 FOR IP): Performed by: NURSE PRACTITIONER

## 2022-04-20 PROCEDURE — 80202 ASSAY OF VANCOMYCIN: CPT

## 2022-04-20 PROCEDURE — 36415 COLL VENOUS BLD VENIPUNCTURE: CPT

## 2022-04-20 PROCEDURE — 82565 ASSAY OF CREATININE: CPT

## 2022-04-20 PROCEDURE — 82962 GLUCOSE BLOOD TEST: CPT

## 2022-04-20 PROCEDURE — 85025 COMPLETE CBC W/AUTO DIFF WBC: CPT

## 2022-04-20 RX ORDER — DIPHENHYDRAMINE HYDROCHLORIDE 50 MG/ML
25 INJECTION INTRAMUSCULAR; INTRAVENOUS ONCE
Status: COMPLETED | OUTPATIENT
Start: 2022-04-20 | End: 2022-04-20

## 2022-04-20 RX ORDER — INSULIN LISPRO 100 [IU]/ML
0-6 INJECTION, SOLUTION INTRAVENOUS; SUBCUTANEOUS
Status: DISCONTINUED | OUTPATIENT
Start: 2022-04-20 | End: 2022-04-27 | Stop reason: HOSPADM

## 2022-04-20 RX ORDER — INSULIN LISPRO 100 [IU]/ML
0-12 INJECTION, SOLUTION INTRAVENOUS; SUBCUTANEOUS
Status: DISCONTINUED | OUTPATIENT
Start: 2022-04-20 | End: 2022-04-27 | Stop reason: HOSPADM

## 2022-04-20 RX ORDER — PROMETHAZINE HYDROCHLORIDE 25 MG/1
25 TABLET ORAL
Status: COMPLETED | OUTPATIENT
Start: 2022-04-20 | End: 2022-04-20

## 2022-04-20 RX ORDER — INSULIN GLARGINE 100 [IU]/ML
25 INJECTION, SOLUTION SUBCUTANEOUS NIGHTLY
Status: DISCONTINUED | OUTPATIENT
Start: 2022-04-20 | End: 2022-04-27 | Stop reason: HOSPADM

## 2022-04-20 RX ORDER — INSULIN LISPRO 100 [IU]/ML
10 INJECTION, SOLUTION INTRAVENOUS; SUBCUTANEOUS
Status: DISCONTINUED | OUTPATIENT
Start: 2022-04-20 | End: 2022-04-24

## 2022-04-20 RX ADMIN — INSULIN GLARGINE 25 UNITS: 100 INJECTION, SOLUTION SUBCUTANEOUS at 22:44

## 2022-04-20 RX ADMIN — SENNOSIDES AND DOCUSATE SODIUM 1 TABLET: 50; 8.6 TABLET ORAL at 22:44

## 2022-04-20 RX ADMIN — PANTOPRAZOLE SODIUM 40 MG: 40 TABLET, DELAYED RELEASE ORAL at 08:19

## 2022-04-20 RX ADMIN — PROMETHAZINE HYDROCHLORIDE 25 MG: 25 TABLET ORAL at 15:32

## 2022-04-20 RX ADMIN — SODIUM CHLORIDE: 9 INJECTION, SOLUTION INTRAVENOUS at 13:58

## 2022-04-20 RX ADMIN — ONDANSETRON 4 MG: 2 INJECTION INTRAMUSCULAR; INTRAVENOUS at 13:33

## 2022-04-20 RX ADMIN — PIPERACILLIN AND TAZOBACTAM 3375 MG: 3; .375 INJECTION, POWDER, LYOPHILIZED, FOR SOLUTION INTRAVENOUS at 05:04

## 2022-04-20 RX ADMIN — INSULIN LISPRO 3 UNITS: 100 INJECTION, SOLUTION INTRAVENOUS; SUBCUTANEOUS at 22:52

## 2022-04-20 RX ADMIN — FLUOXETINE 40 MG: 10 CAPSULE ORAL at 08:18

## 2022-04-20 RX ADMIN — SODIUM CHLORIDE, PRESERVATIVE FREE 10 ML: 5 INJECTION INTRAVENOUS at 14:32

## 2022-04-20 RX ADMIN — PIPERACILLIN AND TAZOBACTAM 3375 MG: 3; .375 INJECTION, POWDER, LYOPHILIZED, FOR SOLUTION INTRAVENOUS at 11:56

## 2022-04-20 RX ADMIN — ENOXAPARIN SODIUM 90 MG: 100 INJECTION SUBCUTANEOUS at 22:44

## 2022-04-20 RX ADMIN — ACETAMINOPHEN 650 MG: 325 TABLET ORAL at 13:33

## 2022-04-20 RX ADMIN — HYDROCODONE BITARTRATE AND ACETAMINOPHEN 1 TABLET: 5; 325 TABLET ORAL at 11:45

## 2022-04-20 RX ADMIN — LOSARTAN POTASSIUM 50 MG: 25 TABLET, FILM COATED ORAL at 08:18

## 2022-04-20 RX ADMIN — SENNOSIDES AND DOCUSATE SODIUM 1 TABLET: 50; 8.6 TABLET ORAL at 08:19

## 2022-04-20 RX ADMIN — PIPERACILLIN AND TAZOBACTAM 3375 MG: 3; .375 INJECTION, POWDER, LYOPHILIZED, FOR SOLUTION INTRAVENOUS at 23:10

## 2022-04-20 RX ADMIN — SODIUM CHLORIDE, PRESERVATIVE FREE 10 ML: 5 INJECTION INTRAVENOUS at 22:48

## 2022-04-20 RX ADMIN — METOPROLOL SUCCINATE 25 MG: 25 TABLET, EXTENDED RELEASE ORAL at 08:19

## 2022-04-20 RX ADMIN — PIPERACILLIN AND TAZOBACTAM 3375 MG: 3; .375 INJECTION, POWDER, LYOPHILIZED, FOR SOLUTION INTRAVENOUS at 17:41

## 2022-04-20 RX ADMIN — HYDROCODONE BITARTRATE AND ACETAMINOPHEN 2 TABLET: 5; 325 TABLET ORAL at 17:40

## 2022-04-20 RX ADMIN — ESCITALOPRAM OXALATE 10 MG: 10 TABLET ORAL at 08:19

## 2022-04-20 RX ADMIN — ASPIRIN 81 MG: 81 TABLET, COATED ORAL at 08:19

## 2022-04-20 RX ADMIN — INSULIN LISPRO 10 UNITS: 100 INJECTION, SOLUTION INTRAVENOUS; SUBCUTANEOUS at 11:58

## 2022-04-20 RX ADMIN — ENOXAPARIN SODIUM 90 MG: 100 INJECTION SUBCUTANEOUS at 08:19

## 2022-04-20 RX ADMIN — DIPHENHYDRAMINE HYDROCHLORIDE 25 MG: 50 INJECTION INTRAMUSCULAR; INTRAVENOUS at 05:02

## 2022-04-20 RX ADMIN — VANCOMYCIN HYDROCHLORIDE 1250 MG: 10 INJECTION, POWDER, LYOPHILIZED, FOR SOLUTION INTRAVENOUS at 14:53

## 2022-04-20 RX ADMIN — VANCOMYCIN HYDROCHLORIDE 1250 MG: 10 INJECTION, POWDER, LYOPHILIZED, FOR SOLUTION INTRAVENOUS at 02:14

## 2022-04-20 RX ADMIN — ATORVASTATIN CALCIUM 40 MG: 40 TABLET, FILM COATED ORAL at 22:44

## 2022-04-20 ASSESSMENT — PAIN SCALES - GENERAL
PAINLEVEL_OUTOF10: 0
PAINLEVEL_OUTOF10: 3
PAINLEVEL_OUTOF10: 7
PAINLEVEL_OUTOF10: 0
PAINLEVEL_OUTOF10: 0
PAINLEVEL_OUTOF10: 9
PAINLEVEL_OUTOF10: 6
PAINLEVEL_OUTOF10: 0
PAINLEVEL_OUTOF10: 5
PAINLEVEL_OUTOF10: 0

## 2022-04-20 ASSESSMENT — PAIN DESCRIPTION - LOCATION
LOCATION: HEAD

## 2022-04-20 ASSESSMENT — PAIN DESCRIPTION - DESCRIPTORS
DESCRIPTORS: ACHING
DESCRIPTORS: ACHING;PRESSURE
DESCRIPTORS: ACHING

## 2022-04-20 ASSESSMENT — PAIN - FUNCTIONAL ASSESSMENT: PAIN_FUNCTIONAL_ASSESSMENT: ACTIVITIES ARE NOT PREVENTED

## 2022-04-20 NOTE — CARE COORDINATION
Chart reviewed, in to see pt to initiate dc planning. Introduced self and role explained. Pt was admitted for LLE edema and L great toe ulcer. Currently awaiting general surgery consult for POC. States he lives at home with his mother and is independent with all needs. He has a cane, walker and manual wheel chair though did not require the assistive devices prior to admission. Explained he follows with PCP, has insurance with affordable RX co-pays and reliable transportation per himself. CM following for POC and remains available if needs arise.

## 2022-04-20 NOTE — PLAN OF CARE
Problem: Pain:  Goal: Pain level will decrease  Description: Pain level will decrease  4/20/2022 0329 by Charlies Hashimoto, RN  Outcome: Met This Shift  4/19/2022 1542 by Deanna Casiano RN  Outcome: Ongoing  Goal: Control of acute pain  Description: Control of acute pain  4/20/2022 0329 by Charlies Hashimoto, RN  Outcome: Met This Shift  4/19/2022 1542 by Deanna Casiano RN  Outcome: Ongoing  Goal: Control of chronic pain  Description: Control of chronic pain  4/20/2022 0329 by Charlies Hashimoto, RN  Outcome: Met This Shift  4/19/2022 1542 by Deanna Casiano RN  Outcome: Ongoing     Problem: Falls - Risk of:  Goal: Will remain free from falls  Description: Will remain free from falls  4/20/2022 0329 by Charlies Hashimoto, RN  Outcome: Met This Shift  4/19/2022 1542 by Deanna Casiano RN  Outcome: Ongoing  Goal: Absence of physical injury  Description: Absence of physical injury  4/20/2022 0329 by Charlies Hashimoto, RN  Outcome: Met This Shift  4/19/2022 1542 by Deanna Casiano RN  Outcome: Ongoing     Problem: Nutrition  Goal: Optimal nutrition therapy  4/20/2022 0329 by Charlies Hashimoto, RN  Outcome: Ongoing  4/19/2022 1638 by Moises Milton RD, LD  Outcome: Ongoing

## 2022-04-20 NOTE — PROGRESS NOTES
Spoke with Dr. Main Jacobson to inform of consult. Stated pt was discharged from practice years ago due to non compliance and will not see pt in hospital. Updated Dr. Unique Leonard. Will see pt.

## 2022-04-20 NOTE — CONSULTS
[facility]  consulted by KAMI Cross for monitoring and adjustment. Indication for treatment: Sepsis secondary to Osteomylitis of left lower extremity   Open wound of left  toe with complication    Goal trough: [] 10-15 mcg/mL or [x] 15-20 mcg/ml  AUC/LIDIA: [] <500 or [x] 400-600    Pertinent Laboratory Values:   Temp Readings from Last 3 Encounters:   04/20/22 98.1 °F (36.7 °C) (Oral)   11/04/21 98.1 °F (36.7 °C) (Temporal)   08/26/21 97.3 °F (36.3 °C) (Temporal)     Recent Labs     04/18/22  1220 04/19/22  0614 04/20/22  0024 04/20/22  0850   WBC 14.7* 10.9*  --  9.5   LACTATE 1.1  --  0.8  --      Recent Labs     04/18/22  1220 04/19/22  0614 04/20/22  0024   BUN 12 9  --    CREATININE <0.5* 0.8* 0.6*     Estimated Creatinine Clearance: 178 mL/min (A) (based on SCr of 0.6 mg/dL (L)). No intake or output data in the 24 hours ending 04/20/22 1216  Vancomycin level:   TROUGH:    Recent Labs     04/20/22  0024   VANCOTROUGH 13.6     RANDOM:  No results for input(s): VANCORANDOM in the last 72 hours.     Assessment:  · SCr, BUN, and urine output:  · Scr with increase in trends, <0.5 -> 0.8  · BUN is stable  · Continue to monitor closely  · Day(s) of therapy: 3  · Vancomycin concentration:   · 4/20:  13.6, 10 hours post-dose    Plan:  · Continue Vancomycin 1250mg IV every 12 hours  · Random level therapeutic @ 13.6, Predicted AUC = 528  · Repeat level in 2 days  · Due to IV line incompatibility, Zosyn was adjusted to 3.375gm ivpb q6h (each dose to be infused over 30 minutes)  · Pharmacy will continue to monitor patient and adjust therapy as indicated    VANCOMYCIN CONCENTRATION SCHEDULED FOR 4/22 @ 01:00    Thank you for the consult,  Bharat Castorena, [address] [place] [address]  4/20/2022 12:16 PM

## 2022-04-20 NOTE — PLAN OF CARE
Nutrition Problem #1: Increased nutrient needs  Intervention: Food and/or Nutrient Delivery: Continue Current Diet,Modify Oral Nutrition Supplement  Nutritional

## 2022-04-20 NOTE — PROGRESS NOTES
ATTENDING PHYSICIAN'S PROGRESS NOTES    Patient:  Willian Diaz      Unit/Bed:2025/2025-A    YOB: 1980    MRN: [id number]     Acct: [de-identified]     Admit date: 4/18/2022    Patient Seen, Chart, Consults notes, Labs, Radiology studies reviewed. SUBJECTIVE:   Day 2 of stay with left foot osteomyelitis. Seen and examined at bedside. No new complaints. Awaiting general surgery evaluation . All other ROS negative except noted in HPI    Past, Family, Social History unchanged from admission. Diet:  ADULT DIET; Regular; 5 carb choices (75 gm/meal)  ADULT ORAL NUTRITION SUPPLEMENT; Breakfast, Lunch, Dinner; Low Calorie/High Protein Oral Supplement  ADULT ORAL NUTRITION SUPPLEMENT; Lunch, Dinner;  Wound Healing Oral Supplement    Medications:  Scheduled Meds:   insulin lispro  10 Units SubCUTAneous TID WC    insulin glargine  25 Units SubCUTAneous Nightly    insulin lispro  0-6 Units SubCUTAneous 2 times per day    enoxaparin  1 mg/kg SubCUTAneous BID    losartan  50 mg Oral Daily    aspirin  81 mg Oral Daily    escitalopram  10 mg Oral Daily    FLUoxetine  40 mg Oral Daily    metoprolol succinate  25 mg Oral Daily    pantoprazole  40 mg Oral QAM AC    atorvastatin  40 mg Oral Nightly    sodium chloride flush  5-40 mL IntraVENous 2 times per day    sennosides-docusate sodium  1 tablet Oral BID    insulin lispro  0-12 Units SubCUTAneous TID WC    vancomycin  1,250 mg IntraVENous Q12H    piperacillin-tazobactam  3,375 mg IntraVENous Q6H     Continuous Infusions:   sodium chloride 150 mL/hr at 04/19/22 2212    sodium chloride      dextrose       PRN Meds:sodium chloride flush, sodium chloride, polyethylene glycol, acetaminophen **OR** acetaminophen, ondansetron **OR** ondansetron, glucose, dextrose, glucagon (rDNA), dextrose, HYDROcodone 5 mg - acetaminophen **OR** HYDROcodone 5 mg - acetaminophen    OBJECTIVE:  CBC:   Recent Labs     04/18/22  1220 04/19/22  0614 04/20/22  0850 WBC 14.7* 10.9* 9.5   HGB 10.9* 8.8* 9.8*   * 575* 636*     BMP:    Recent Labs     04/18/22  1220 04/18/22  2149 04/19/22  0614 04/20/22  0024   *  --  133*  --    K 4.5  --  4.4  --    CL 93*  --  100  --    CO2 19*  --  24  --    BUN 12  --  9  --    CREATININE <0.5*  --  0.8* 0.6*   GLUCOSE 15* 212 191*  --      Calcium:  Recent Labs     04/19/22  0614   CALCIUM 7.7*     Ionized Calcium:No results for input(s): IONCA in the last 72 hours. Magnesium:No results for input(s): MG in the last 72 hours. Phosphorus:No results for input(s): PHOS in the last 72 hours. BNP:No results for input(s): BNP in the last 72 hours. Glucose:  Recent Labs     04/20/22  0218 04/20/22  0748 04/20/22  1124   POCGLU 125* 116* 150*     HgbA1C:   Recent Labs     04/18/22  1220   LABA1C 10.4*     INR:   Recent Labs     04/19/22  0614   INR 1.14     Hepatic:   Recent Labs     04/19/22 0614   ALKPHOS 78  78   ALT 6*  6*   AST 9*  9*   PROT 7.1  7.1   BILITOT 0.4  0.4   BILIDIR 0.2   LABALBU 2.5*  2.5*     Amylase and Lipase:No results for input(s): LACTA, AMYLASE in the last 72 hours. Lactic Acid: No results for input(s): LACTA in the last 72 hours. Troponin: No results for input(s): CKTOTAL, CKMB, TROPONINT in the last 72 hours. BNP: No results for input(s): BNP in the last 72 hours. Lipids: No results for input(s): CHOL, TRIG, HDL, LDL, LDLCALC in the last 72 hours.   ABGs: No results found for: PH, PCO2, PO2, HCO3, O2SAT    Radiology reports as per the Radiologist  Radiology:   Echocardiogram complete 2D with doppler with color  Result Date: 4/19/2022  Transthoracic Echocardiography Report (TTE)  Demographics   Patient Name       Abraham Rodriguez      Date of Study       04/19/2022   Date of Birth      1980         Gender              Male   Age                39 year(s)         Race                   Patient Number     [id number]         Room Number         2025   Visit Number       [id number] Corporate ID       [id number]   Accession Number   [id number]         [address] [facility]   Ordering Physician Henri Rush PA-C         Physician           MD  Procedure Type of Study   TTE procedure:ECHOCARDIOGRAM COMPLETE 2D W DOPPLER W COLOR. Procedure Date Date: 04/19/2022 Start: 10:32 AM Study Location: Portable Technical Quality: Adequate visualization Indications:Dyspnea/SOB. Patient Status: Routine Height: 72 inches Weight: 170 pounds BSA: 1.99 m2 BMI: 23.06 kg/m2 HR: 81 bpm  Conclusions   Summary  Left ventricular systolic function is normal.  Ejection fraction is visually estimated at 55%. No significant valvular disease noted. No evidence of any pericardial effusion. Signature   ------------------------------------------------------------------  Electronically signed by Daryle Krabbe MD (Interpreting  physician) on 04/19/2022 at 12:42 PM  ------------------------------------------------------------------   Findings   Left Ventricle  Left ventricular systolic function is normal.  Ejection fraction is visually estimated at 55%. Normal diastolic function. Left ventricle size is normal.  No regional wall motion abnormalities. Left Atrium  Mildly dilated left atrium. Right Atrium  Essentially normal right atrium. Right Ventricle  Essentially normal right ventricle. Aortic Valve  Structurally normal aortic valve. Mitral Valve  Structurally normal mitral valve. Tricuspid Valve  Structurally normal tricuspid valve. Pulmonic Valve  The pulmonic valve was not well visualized. Pericardial Effusion  No evidence of any pericardial effusion. Pleural Effusion  No evidence of pleural effusion. Miscellaneous  Inferior vena cava is dilated, measuring at 2.2 cm, but does collapse with  respiration. Aorta was not clearly visualized.   M-Mode/2D Measurements & Calculations       XR FOOT LEFT (MIN 3 VIEWS)  Result Date: 4/18/2022  EXAMINATION: THREE XRAY VIEWS OF THE LEFT FOOT 4/18/2022 12:04 pm COMPARISON: 06/12/2021 HISTORY: ORDERING SYSTEM PROVIDED HISTORY: left foot pain TECHNOLOGIST PROVIDED HISTORY: Reason for exam:->left foot pain Reason for Exam: wound infection FINDINGS: Extensive bony destruction has developed throughout the proximal phalanx 1st digit, 1st metatarsal head and base of the distal phalanx 1st digit. There is diffuse osteopenia and osteolysis in the 1st metatarsal extending into the base with superimposed pathologic fracture. Chronic appearing periosteal reaction is present in the 2nd metatarsal. Areas of bony fragmentation are noted in the distal phalanx 2nd digit concerning for acute osteomyelitis. Subtle areas of suspected osteolysis are noted in the 2nd metatarsal head. Remote postoperative changes from 3rd metatarsal amputation. The 4th and 5th digits are intact. Midfoot and hindfoot bones are intact. Interval fragmentation and osteopenia throughout the 1st metatarsal.  Absence or bony destruction of the proximal phalanx 1st digit with diffuse soft tissue swelling. Additional bony destruction in the proximal phalanx. Overall findings concerning for acute osteomyelitis. Correlate for any interval surgery. Bony destruction of the distal tuft, distal phalanx 2nd digit concerning for acute osteomyelitis. Questionable osteomyelitis in the 2nd metatarsal head. XR FOOT RIGHT (MIN 3 VIEWS)  Result Date: 4/18/2022  EXAMINATION: THREE XRAY VIEWS OF THE RIGHT FOOT 4/18/2022 12:27 pm COMPARISON: 01/04/2021 HISTORY: ORDERING SYSTEM PROVIDED HISTORY: ulcer right foot TECHNOLOGIST PROVIDED HISTORY: Reason for exam:->ulcer right foot Reason for Exam: ulcer right foot FINDINGS: Status post transmetatarsal amputation across the forefoot. No radiopaque foreign body in the soft tissues. No acute fracture.   No acute periostitis or bony destruction. Linear ulcer adjacent to the 1st metatarsal remnant is identified. Chest No acute periostitis or bony destruction. Linear ulcer adjacent to the 1st metatarsal remnant. VL DUP LOWER EXTREMITY VENOUS LEFT  Addendum Date: 4/18/2022    ADDENDUM: Critical results were called by Dr. Margie Jacobs to [facility] on 4/18/2022 at 14:11. Result Date: 4/18/2022  EXAMINATION: DUPLEX VENOUS ULTRASOUND OF THE LEFT LOWER EXTREMITY 4/18/2022 1:22 pm TECHNIQUE: Duplex ultrasound using B-mode/gray scaled imaging and Doppler spectral analysis and color flow was obtained of the deep venous structures of the left extremity. COMPARISON: 06/03/2018 HISTORY: ORDERING SYSTEM PROVIDED HISTORY: ro dvt, leg pain TECHNOLOGIST PROVIDED HISTORY: Reason for exam:->ro dvt, leg pain Reason for Exam: wound to left foot, pain to lower leg, swelling from knee down x 3 mos FINDINGS: Occlusive thrombus in 1 of the peroneal veins. The visualized veins of the left lower extremity are otherwise patent and free of echogenic thrombus. The veins demonstrate good compressibility with normal color flow study and spectral analysis. Similar inguinal lymphadenopathy since at least 2021.   1. Occlusive thrombus in 1 of the peroneal veins. 2. Similar inguinal lymphadenopathy since at least 2021. Physical Exam:  Vitals: /88   Pulse 76   Temp 98.1 °F (36.7 °C) (Oral)   Resp 12   Ht 6' 0.01\" (1.829 m)   Wt 190 lb 14.7 oz (86.6 kg)   SpO2 98%   BMI 25.89 kg/m²   24 hour intake/output:  No intake or output data in the 24 hours ending 04/20/22 1303  Last 3 weights:   Wt Readings from Last 3 Encounters:   04/18/22 190 lb 14.7 oz (86.6 kg)   01/24/22 188 lb (85.3 kg)   08/09/21 172 lb (78 kg)     General appearance - alert, well appearing, and in no distress  HEENT: Normocephalic, Atraumatic, Conjuctiva pink, PERRL, Oral mucosa normal, Lips, teeth and gums normal, Trachea midline, Thyroid normal and No noted lymphadenopathy  Chest - clear to auscultation, no wheezes, rales or rhonchi, symmetric air entry  Cardiovascular - normal rate, regular rhythm, normal S1, S2, no murmurs, rubs, clicks or gallops  Abdomen - soft, nontender, nondistended, no masses or organomegaly   Neurological - Alert and oriented, Normal speech, No focal findings or movement disorder noted and Motor and sensory grossly normal bilaterally  Integumentary - Skin color, texture, turgor normal. No Rashes or lesions  Musculoskeletal -slight left ankle edema. Dressing left foot., Full ROM times 4 extremities and No clubbing or cyanosis  S/ post transmetatarsal amputation on the right foot. DVT prophylaxis: [x] Lovenox                                 [] SCDs                                 [] SQ Heparin                                 [] Encourage ambulation           [] Already on Anticoagulation                 ASSESSMENT / PLAN :    Principal Problem:    Acute osteomyelitis ([address]  Resolved Problems:    * No resolved hospital problems. *  Orval Barrier is a 39 y.o. male with a pmh of osteomyelitis, hypertension, uncontrolled type 2 diabetes who presents with osteomyelitis of the left lower extremity x 3 months        # Sepsis secondary to diabetic foot ulcer/Acute Osteomylitis of Left Lower extremity   # Open wound of left  toe with complication    X-ray ankle left foot with changes concerning for osteomyelitis in the 1st and 2nd digit. Met sepsis criteria based on leukocytosis, tachycardia, tachypnea, active source of infection  Continue Vanco and Zosyn. Wound culture with light growth enterobacter cloaca sensitive to zosyn. Await general surgery consult  Seen by cardiology and cleared for surgery  Wound care team following for wound dressing  Follow blood cultures     Acute occlusive left peroneal deep venous thrombosis              Risk factors include prolonged immobility, infection.  Seen on venous duplex LLE              Continue lovenox 1 mg/kg BID and plan to change to NOAC after surgery. Uncontrolled Insulin dependent type 2 diabetes  Type 2 Diabetes with complication             Continue current insulin regimen             Endocrine following. Blood sugars better. Holding home oral antihyperglycemics.              HgA1c 10.4     Hyponatremia           Improved from 127-133           Repeat BMP in a.m.                    Essential hypertension      Continue home medications       Stable    Hyperlipidemia   On lipitor            DVT prophylaxis: Lovenox    CODE STATUS: Full code       Electronically signed by Ibeth Morales MD on 4/20/2022 at 1:03 PM    Rounding Hospitalist

## 2022-04-20 NOTE — PROGRESS NOTES
Pt having episodes of nausea and vomiting this afternoon. Given prn zofran but did not relieve n/v. Messaged Dr Abbi Cassidy regarding this. Dr Abbi Cassidy gave order for oral prn Phenergan 25mg once prn. Also monitoring pt's blood glucose as he is on insulin and has been throwing up meals. Last glucose check was 88. Pt provided juice and crackers and encouraged to slowly intake.

## 2022-04-20 NOTE — PROGRESS NOTES
Spoke with Dr. Lauretha Lesches. Was not placed on his list since admitted.  Pt has seen Dr. Darron Macias podiatrist for right transmetatarsal amputation in past so will consult podiatry first.

## 2022-04-20 NOTE — CONSULTS
Endocrinology   Consult Note  4/18/2022 11:35 AM     Primary Care provider: Lyle Yanez MD     Referring physician:  Padmini Mtz MD     Dear Doctor Eileen Vivas for the Consult     Pt. Was Admitted for : Infection of the left foot possible osteomyelitis    Reason for Consult: Better control of blood glucose      History Obtained From:  Patient/ EMR       HISTORY OF PRESENT ILLNESS:                The patient is a 39 y.o. male with significant past medical history of right foot partial amputation, diabetes mellitus pretension hyperlipidemia back pain the lumbar radiculopathy seen with normal left foot infection and possibly cellulitis cellulitis and osteomyelitis. He also had amputation of third toe of left foot earlier I was  consulted for better control of blood glucose. ROS:   Pt's ROS done in detail. Abnormal ROS are noted in Medical and Surgical History Section below:      Other Medical History:        Diagnosis Date    Callus of foot     Right foot - see's Dr. Yonathan Barragan Diabetic ulcer of left midfoot associated with type 2 diabetes mellitus, with necrosis of muscle ([address] 6/7/2021    Diabetic ulcer of right midfoot associated with type 2 diabetes mellitus, with fat layer exposed ([address] 8/11/2020    Dizziness     positional    Essential hypertension     Follows with PCP    Hyperlipidemia     Lumbar radiculopathy     Septic embolism ([address] 6/13/2020     Surgical History:        Procedure Laterality Date    ACHILLES TENDON SURGERY Right 1/8/2021    RIGHT ACHILLES TENDON LENGTHENING REPAIR performed by Rex Dumont DPM at [address]  03/2018    [facility]    DENTAL SURGERY      teeth extractions -half per patient    HERNIA REPAIR Bilateral 5/27/2020    BIALTERAL HERNIA INGUINAL REPAIR performed by Rosemarie Mcdaniels MD at [address] [place] [address]  2018    DC OFFICE/OUTPT VISIT,PROCEDURE ONLY Left 4/17/2018    L5-S1 HEMILAMINECTOMY, REMOVAL OF DISC LEFT SIDE performed by Lacie Cuevas MD at [address] Right 1/8/2021    RIGHT TRANSMETATARSAL TOE AMPUTATION performed by Kwan Salvador DPM at [address] [facility] EXTRACTION         Allergies:  Patient has no known allergies. Family History:       Problem Relation Age of Onset    Heart Disease Father     Diabetes Father     Diabetes Mother     Heart Disease Mother     Other Mother     Kidney Disease Mother     Heart Attack Mother      REVIEW OF SYSTEMS:  Review of System Done as noted above     PHYSICAL EXAM:      Vitals:    /88   Pulse 96   Temp 98.1 °F (36.7 °C) (Oral)   Resp 21   Ht 6' (1.829 m)   Wt 190 lb 14.7 oz (86.6 kg)   SpO2 97%   BMI 25.89 kg/m²     CONSTITUTIONAL:  awake, alert, cooperative, appears stated age   EYES:  vision intact Fundoscopic Exam not performed   ENT:Normal  NECK:  Supple, No JVD. Thyroid Exam:Normal   LUNGS:  Has Vesicular Breath Sounds,   CARDIOVASCULAR:  Normal apical impulse, regular rate and rhythm, normal S1 and S2, no S3 or S4, and has no  murmur   ABDOMEN:  No scars, normal bowel sounds, soft, non-distended, non-tender, no masses palpated, no hepatolienomegaly  Musculoskeletal: Normal  Extremities: Normal, peripheral pulses normal, , has no edema right foot partial amputation, left foot third toe amputation now has left foot infected with cellulitis and possibly osteomyelitis  NEUROLOGIC:  Awake, alert, oriented to name, place and time. Cranial nerves II-XII are grossly intact. Motor is  intact. Sensory neuropathy.  ,  and gait is abnormal.    DATA:    CBC:   Recent Labs     04/18/22  1220 04/19/22  0614   WBC 14.7* 10.9*   HGB 10.9* 8.8*   * 575*    CMP:  Recent Labs     04/18/22  1220 04/19/22  0614 04/20/22  0024   * 133*  --    K 4.5 4.4  --    CL 93* 100  --    CO2 19* 24  --    BUN 12 9  --    CREATININE <0.5* 0.8* 0.6*   CALCIUM 9.2 7.7*  --    PROT 0.8* 7.1  7.1  -- LABALBU 2.9* 2.5*  2.5*  --    BILITOT 0.0 0.4  0.4  --    ALKPHOS 111 78  78  --    AST 12* 9*  9*  --    ALT 8* 6*  6*  --      Lipids:   Lab Results   Component Value Date    CHOL 108 03/13/2021    CHOL 142 03/12/2020    HDL 27 03/13/2021    TRIG 178 03/13/2021     Glucose:   Recent Labs     04/19/22  1658 04/19/22  2153 04/20/22  0218   POCGLU 231* 90 125*     Hemoglobin A1C:   Lab Results   Component Value Date    LABA1C 10.4 04/18/2022     Free T4: No results found for: T4FREE  Free T3: No results found for: FT3  TSH High Sensitivity:   Lab Results   Component Value Date    TSHHS 1.770 03/12/2021       VL DUP LOWER EXTREMITY VENOUS LEFT   Final Result   Addendum 1 of 1   ADDENDUM:   Critical results were called by Dr. Jojo Bates to [facility] on    4/18/2022   at 14:11. Final   1. Occlusive thrombus in 1 of the peroneal veins. 2. Similar inguinal lymphadenopathy since at least 2021. XR FOOT RIGHT (MIN 3 VIEWS)   Final Result   Chest No acute periostitis or bony destruction. Linear ulcer adjacent to the   1st metatarsal remnant. XR FOOT LEFT (MIN 3 VIEWS)   Final Result   Interval fragmentation and osteopenia throughout the 1st metatarsal.  Absence   or bony destruction of the proximal phalanx 1st digit with diffuse soft   tissue swelling. Additional bony destruction in the proximal phalanx. Overall findings concerning for acute osteomyelitis. Correlate for any   interval surgery. Bony destruction of the distal tuft, distal phalanx 2nd digit concerning for   acute osteomyelitis. Questionable osteomyelitis in the 2nd metatarsal head.                 Scheduled Medicines   Medications:    insulin lispro  10 Units SubCUTAneous TID WC    insulin glargine  25 Units SubCUTAneous Nightly    insulin lispro  0-6 Units SubCUTAneous 2 times per day    enoxaparin  1 mg/kg SubCUTAneous BID    insulin glargine  0.25 Units/kg SubCUTAneous Nightly    losartan  50 mg Oral Daily    aspirin  81 mg Oral Daily    escitalopram  10 mg Oral Daily    FLUoxetine  40 mg Oral Daily    metoprolol succinate  25 mg Oral Daily    pantoprazole  40 mg Oral QAM AC    atorvastatin  40 mg Oral Nightly    sodium chloride flush  5-40 mL IntraVENous 2 times per day    sennosides-docusate sodium  1 tablet Oral BID    insulin lispro  0-12 Units SubCUTAneous TID WC    vancomycin  1,250 mg IntraVENous Q12H    piperacillin-tazobactam  3,375 mg IntraVENous Q6H      Infusions:    sodium chloride 150 mL/hr at 04/19/22 2212    sodium chloride      dextrose           IMPRESSION    Patient Active Problem List   Diagnosis    Unstable angina ([facility])    Lumbar back pain with radiculopathy affecting left lower extremity    S/P lumbar laminectomy    Type 2 diabetes mellitus with diabetic neuropathy, without long-term current use of insulin ([facility])    Essential hypertension    Gastroesophageal reflux disease without esophagitis    WD-Cellulitis of foot right    Chest pain    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 ([address]    WD-Diabetic ulcer of toe of left foot associated with type 2 diabetes mellitus, with necrosis of muscle ([address]    WD-Diabetic ulcer of right midfoot associated with type 2 diabetes mellitus, with fat layer exposed ([address]    Diabetic foot ([address]    Moderate nonproliferative diabetic retinopathy of both eyes without macular edema associated with type 2 diabetes mellitus ([address]    Acute osteomyelitis ([address]    Acute osteomyelitis of foot ([address]         RECOMMENDATIONS:      1. Reviewed POC blood glucose . Labs and X ray results   2. Reviewed Home and Current Medicines   3. Will Start On meal/ Correction bolus Humalog/ Lantus Insulin regime   4. Monitor Blood glucose frequently   5. Modify  the dose of Insulin  as needed        Will follow with you  Again thank you for sharing pt's care with me.      Truly yours,       Augustus Stephenson MD

## 2022-04-20 NOTE — PROGRESS NOTES
Messaged Dr Marcano Needs this morning regarding labs for the pt. Pt does not have an order placed for a cbc this morning. Dr Marcano Needs gave order for cbc. Also Dr Marcano Needs inquired about general surgery consult. Informed Dr Marcano Needs that there was still an active consult in the chart that was sent in the ER but pt has not been seen. Spoke with Zachery Villa RN regarding pt being seen. Sandy stated that pt is to be seen by Dr Dieudonne Bolton at some point today. Dr Marcano Needs updated.

## 2022-04-20 NOTE — PROGRESS NOTES
Cardiology Progress Note        Admit Date:  4/18/2022    Consult reason/ Seen today for: DVT    Subjective and  Overnight Events:  Patient reports no feeling in left lower extremity from mid calf down. Assessment / Plan / Recommendation:     1. Acute DVT of left peroneal vein: Currently on weight-based Lovenox, transition to NOAC if no surgical intervention. Echocardiogram does not indicate any RV strain. 2. Acute osteomyelitis of second metatarsal head: surgery consulted. 3. HTN:  , Cont losartan to 50 mg daily, continue with Toprol-XL 25 mg daily. 4. Dyslipidemia: Continue with statin. 5. Surgical clearance: Patient considered low/moderate risk for surgery    Please call with questions    Echo      Left ventricular systolic function is normal.   Ejection fraction is visually estimated at 55%. No significant valvular disease noted. No evidence of any pericardial effusion. History of Presenting Illness:    Chief complain on admission : 39 y. o.year old who is admitted for  Chief Complaint   Patient presents with    Wound Infection     left and right foot, swelling to left leg        Past medical history:    has a past medical history of Callus of foot, Diabetic ulcer of left midfoot associated with type 2 diabetes mellitus, with necrosis of muscle ([address] Diabetic ulcer of right midfoot associated with type 2 diabetes mellitus, with fat layer exposed ([address] Dizziness, Essential hypertension, Hyperlipidemia, Lumbar radiculopathy, and Septic embolism ([address] 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   Social History: reports that he has never smoked. He has never used smokeless tobacco. He reports current alcohol use. He reports that he does not use drugs. Family history:  family history includes Diabetes in his father and mother; Heart Attack in his mother; Heart Disease in his father and mother; Kidney Disease in his mother; Other in his mother. No Known Allergies    Review of Systems:  Review of Systems   Cardiovascular: Negative for chest pain, palpitations and leg swelling. Musculoskeletal: Positive for arthralgias and gait problem. Skin: Negative. Neurological: Negative for dizziness and weakness. All other systems reviewed and are negative. /88   Pulse 76   Temp 98.1 °F (36.7 °C) (Oral)   Resp 12   Ht 6' 0.01\" (1.829 m)   Wt 190 lb 14.7 oz (86.6 kg)   SpO2 98%   BMI 25.89 kg/m²     No intake or output data in the 24 hours ending 04/20/22 1228    Physical Exam:  Physical Exam  Constitutional:       Appearance: He is well-developed. Cardiovascular:      Rate and Rhythm: Normal rate and regular rhythm. Pulses: Intact distal pulses. Dorsalis pedis pulses are 1+ on the right side and 1+ on the left side. Posterior tibial pulses are 1+ on the right side and 1+ on the left side. Heart sounds: Normal heart sounds, S1 normal and S2 normal.   Pulmonary:      Effort: Pulmonary effort is normal.      Breath sounds: Normal breath sounds. Musculoskeletal:      Right Lower Extremity: (phalanges amputated)     Left Lower Extremity: (phalanges amputated)  Skin:     General: Skin is warm and dry. Neurological:      Mental Status: He is alert and oriented to person, place, and time.           Medications:    insulin lispro  10 Units SubCUTAneous TID WC    insulin glargine  25 Units SubCUTAneous Nightly    insulin lispro  0-6 Units SubCUTAneous 2 times per day    enoxaparin  1 mg/kg SubCUTAneous BID    losartan  50 mg Oral Daily    aspirin  81 mg Oral Daily    escitalopram  10 mg Oral Daily    FLUoxetine  40 mg Oral Daily    metoprolol succinate  25 mg Oral Daily    pantoprazole  40 mg Oral QAM AC    atorvastatin  40 mg Oral Nightly    sodium chloride flush  5-40 mL IntraVENous 2 times per day    sennosides-docusate sodium  1 tablet Oral BID    insulin lispro  0-12 Units SubCUTAneous TID WC    vancomycin  1,250 mg IntraVENous Q12H    piperacillin-tazobactam  3,375 mg IntraVENous Q6H      sodium chloride 150 mL/hr at 04/19/22 2212    sodium chloride      dextrose       sodium chloride flush, sodium chloride, polyethylene glycol, acetaminophen **OR** acetaminophen, ondansetron **OR** ondansetron, glucose, dextrose, glucagon (rDNA), dextrose, HYDROcodone 5 mg - acetaminophen **OR** HYDROcodone 5 mg - acetaminophen    Lab Data:  CBC:   Recent Labs     04/18/22  1220 04/19/22  0614 04/20/22  0850   WBC 14.7* 10.9* 9.5   HGB 10.9* 8.8* 9.8*   HCT 35.0* 29.3* 31.7*   MCV 92.8 96.1 91.9   * 575* 636*     BMP:   Recent Labs     04/18/22  1220 04/19/22  0614 04/20/22  0024   * 133*  --    K 4.5 4.4  --    CL 93* 100  --    CO2 19* 24  --    BUN 12 9  --    CREATININE <0.5* 0.8* 0.6*     PT/INR:   Recent Labs     04/19/22  0614   PROTIME 14.7*   INR 1.14     BNP:  No results for input(s): PROBNP in the last 72 hours. TROPONIN: No results for input(s): TROPONINT in the last 72 hours. Impression:  Principal Problem:    Acute osteomyelitis ([facility] [address]  Resolved Problems:    * No resolved hospital problems. *       All labs, medications and tests reviewed by myself, continue all other medications of all above medical condition listed as is except for changes mentioned above. Thank you   Please call with questions.     Electronically signed by Karen Sanders MD on 4/20/2022 at 12:28 PM

## 2022-04-20 NOTE — CONSULTS
Comprehensive Nutrition Assessment    Type and Reason for Visit:  Consult    Nutrition Recommendations/Plan:   1. Add wound healing oral nutrition supplement  2. Please document all po intake  3. Will monitor po intake, weight trends, poc     Malnutrition Assessment:  Malnutrition Status:  Insufficient data (04/19/22 5947)    Context:  Acute Illness       Nutrition Assessment:    Received dietitian consult for wounds, pt currently on 5 carb choice diet with high protein oral nutrition supplements ordered, visited pt at bedside, pt rpeorts UBW ~170#, pt states thats he suspects weight gain d/t being off this foot for 3 mo, reports that nausea improved from yesterday and appetite is better, reports that he likes the high protein supplement, reports consuming ~75% of breakfast this morning, discussed wound healing supplement and rationale, pt agreeable to trial wound healing supplement, will continue to follow at moderate nutrition risk    Nutrition Related Findings:    A1C 10.4, Wound Type: Multiple,Diabetic Ulcer       Current Nutrition Intake & Therapies:    Average Meal Intake: Unable to assess  Average Supplements Intake: Unable to assess  ADULT DIET; Regular; 5 carb choices (75 gm/meal)  ADULT ORAL NUTRITION SUPPLEMENT; Breakfast, Lunch, Dinner; Low Calorie/High Protein Oral Supplement    Anthropometric Measures:  Height: 6' 0.01\" (182.9 cm)  Ideal Body Weight (IBW): 178 lbs (81 kg)    Admission Body Weight:  (n/a)  Current Body Weight: 190 lb 14.7 oz (86.6 kg), 107.3 % IBW. Weight Source: Bed Scale  Current BMI (kg/m2): 25.9  Usual Body Weight:  (n/a)  Weight Adjustment For: No Adjustment    BMI Categories: Overweight (BMI 25.0-29. 9)    Estimated Daily Nutrient Needs:  Energy Requirements Based On: Formula  Weight Used for Energy Requirements: Admission  Energy (kcal/day): 2076-5525  Weight Used for Protein Requirements: Admission  Protein (g/day):   Method Used for Fluid Requirements: 1 ml/kcal  Fluid (ml/day): 8917-7709    Nutrition Diagnosis:   · Increased nutrient needs related to healing as evidenced by  multiple wounds    Nutrition Interventions:   Food and/or Nutrient Delivery: Continue Current Diet,Modify Oral Nutrition Supplement  Nutrition Education/Counseling: No recommendation at this time  Coordination of Nutrition Care: Continue to monitor while inpatient     Goals:  Previous Goal Met: Progressing toward Goal(s)  Goals: Meet at least 75% of estimated needs,by next RD assessment     Nutrition Monitoring and Evaluation:   Behavioral-Environmental Outcomes: None Identified  Food/Nutrient Intake Outcomes: Food and Nutrient Intake,Supplement Intake  Physical Signs/Symptoms Outcomes: Biochemical Data,GI Status,Hemodynamic Status,Fluid Status or Edema,Weight,Skin    Discharge Planning:     Too soon to determine     Se[address], [address], [place]  Contact: [phone]

## 2022-04-21 ENCOUNTER — APPOINTMENT (OUTPATIENT)
Dept: MRI IMAGING | Age: 42
DRG: 710 | End: 2022-04-21
Payer: COMMERCIAL

## 2022-04-21 LAB
ANION GAP SERPL CALCULATED.3IONS-SCNC: 8 MMOL/L (ref 4–16)
BUN BLDV-MCNC: 5 MG/DL (ref 6–23)
CALCIUM SERPL-MCNC: 8.2 MG/DL (ref 8.3–10.6)
CHLORIDE BLD-SCNC: 99 MMOL/L (ref 99–110)
CO2: 28 MMOL/L (ref 21–32)
CREAT SERPL-MCNC: 0.7 MG/DL (ref 0.9–1.3)
CULTURE: ABNORMAL
CULTURE: ABNORMAL
CULTURE: NORMAL
DOSE AMOUNT: NORMAL
DOSE TIME: NORMAL
GFR AFRICAN AMERICAN: >60 ML/MIN/1.73M2
GFR NON-AFRICAN AMERICAN: >60 ML/MIN/1.73M2
GLUCOSE BLD-MCNC: 126 MG/DL (ref 70–99)
GLUCOSE BLD-MCNC: 132 MG/DL (ref 70–99)
GLUCOSE BLD-MCNC: 138 MG/DL (ref 70–99)
GLUCOSE BLD-MCNC: 147 MG/DL (ref 70–99)
GLUCOSE BLD-MCNC: 219 MG/DL (ref 70–99)
GLUCOSE BLD-MCNC: 96 MG/DL (ref 70–99)
HCT VFR BLD CALC: 31.4 % (ref 42–52)
HEMOGLOBIN: 9.6 GM/DL (ref 13.5–18)
HIGH SENSITIVE C-REACTIVE PROTEIN: 56.6 MG/L
Lab: ABNORMAL
Lab: NORMAL
POTASSIUM SERPL-SCNC: 4.4 MMOL/L (ref 3.5–5.1)
SODIUM BLD-SCNC: 135 MMOL/L (ref 135–145)
SPECIMEN: ABNORMAL
SPECIMEN: NORMAL
VANCOMYCIN RANDOM: 17 UG/ML

## 2022-04-21 PROCEDURE — 6360000004 HC RX CONTRAST MEDICATION: Performed by: SURGERY

## 2022-04-21 PROCEDURE — A9579 GAD-BASE MR CONTRAST NOS,1ML: HCPCS | Performed by: SURGERY

## 2022-04-21 PROCEDURE — 36415 COLL VENOUS BLD VENIPUNCTURE: CPT

## 2022-04-21 PROCEDURE — 6370000000 HC RX 637 (ALT 250 FOR IP): Performed by: INTERNAL MEDICINE

## 2022-04-21 PROCEDURE — 6360000002 HC RX W HCPCS: Performed by: NURSE PRACTITIONER

## 2022-04-21 PROCEDURE — 85018 HEMOGLOBIN: CPT

## 2022-04-21 PROCEDURE — 85014 HEMATOCRIT: CPT

## 2022-04-21 PROCEDURE — 2580000003 HC RX 258: Performed by: NURSE PRACTITIONER

## 2022-04-21 PROCEDURE — 86141 C-REACTIVE PROTEIN HS: CPT

## 2022-04-21 PROCEDURE — 80202 ASSAY OF VANCOMYCIN: CPT

## 2022-04-21 PROCEDURE — 73720 MRI LWR EXTREMITY W/O&W/DYE: CPT

## 2022-04-21 PROCEDURE — 80048 BASIC METABOLIC PNL TOTAL CA: CPT

## 2022-04-21 PROCEDURE — 94761 N-INVAS EAR/PLS OXIMETRY MLT: CPT

## 2022-04-21 PROCEDURE — 2060000000 HC ICU INTERMEDIATE R&B

## 2022-04-21 PROCEDURE — 82962 GLUCOSE BLOOD TEST: CPT

## 2022-04-21 PROCEDURE — 6370000000 HC RX 637 (ALT 250 FOR IP): Performed by: NURSE PRACTITIONER

## 2022-04-21 RX ADMIN — SENNOSIDES AND DOCUSATE SODIUM 1 TABLET: 50; 8.6 TABLET ORAL at 21:56

## 2022-04-21 RX ADMIN — ENOXAPARIN SODIUM 90 MG: 100 INJECTION SUBCUTANEOUS at 21:56

## 2022-04-21 RX ADMIN — PIPERACILLIN AND TAZOBACTAM 3375 MG: 3; .375 INJECTION, POWDER, LYOPHILIZED, FOR SOLUTION INTRAVENOUS at 12:41

## 2022-04-21 RX ADMIN — PIPERACILLIN AND TAZOBACTAM 3375 MG: 3; .375 INJECTION, POWDER, LYOPHILIZED, FOR SOLUTION INTRAVENOUS at 18:04

## 2022-04-21 RX ADMIN — HYDROCODONE BITARTRATE AND ACETAMINOPHEN 1 TABLET: 5; 325 TABLET ORAL at 15:53

## 2022-04-21 RX ADMIN — ATORVASTATIN CALCIUM 40 MG: 40 TABLET, FILM COATED ORAL at 21:56

## 2022-04-21 RX ADMIN — ACETAMINOPHEN 650 MG: 325 TABLET ORAL at 22:00

## 2022-04-21 RX ADMIN — INSULIN LISPRO 10 UNITS: 100 INJECTION, SOLUTION INTRAVENOUS; SUBCUTANEOUS at 17:54

## 2022-04-21 RX ADMIN — PIPERACILLIN AND TAZOBACTAM 3375 MG: 3; .375 INJECTION, POWDER, LYOPHILIZED, FOR SOLUTION INTRAVENOUS at 05:38

## 2022-04-21 RX ADMIN — GADOTERIDOL 18 ML: 279.3 INJECTION, SOLUTION INTRAVENOUS at 11:55

## 2022-04-21 RX ADMIN — VANCOMYCIN HYDROCHLORIDE 1250 MG: 10 INJECTION, POWDER, LYOPHILIZED, FOR SOLUTION INTRAVENOUS at 14:38

## 2022-04-21 RX ADMIN — SODIUM CHLORIDE, PRESERVATIVE FREE 10 ML: 5 INJECTION INTRAVENOUS at 10:12

## 2022-04-21 RX ADMIN — SODIUM CHLORIDE: 9 INJECTION, SOLUTION INTRAVENOUS at 09:08

## 2022-04-21 RX ADMIN — INSULIN LISPRO 4 UNITS: 100 INJECTION, SOLUTION INTRAVENOUS; SUBCUTANEOUS at 17:51

## 2022-04-21 RX ADMIN — VANCOMYCIN HYDROCHLORIDE 1250 MG: 10 INJECTION, POWDER, LYOPHILIZED, FOR SOLUTION INTRAVENOUS at 09:07

## 2022-04-21 ASSESSMENT — PAIN SCALES - GENERAL
PAINLEVEL_OUTOF10: 0
PAINLEVEL_OUTOF10: 0
PAINLEVEL_OUTOF10: 3
PAINLEVEL_OUTOF10: 6

## 2022-04-21 ASSESSMENT — PAIN DESCRIPTION - DESCRIPTORS: DESCRIPTORS: ACHING;SORE

## 2022-04-21 ASSESSMENT — PAIN DESCRIPTION - LOCATION: LOCATION: FOOT

## 2022-04-21 ASSESSMENT — PAIN DESCRIPTION - ORIENTATION: ORIENTATION: RIGHT;LEFT

## 2022-04-21 NOTE — PROGRESS NOTES
ATTENDING PHYSICIAN'S PROGRESS NOTES    Patient:  Jasson Hull      Unit/Bed:2025/2025-A    YOB: 1980    MRN: [id number]     Acct: [de-identified]     Admit date: 4/18/2022    Patient Seen, Chart, Consults notes, Labs, Radiology studies reviewed. SUBJECTIVE:   Day 3 of stay with left foot osteomyelitis. Seen and examined at bedside. No new complaints. General surgery has seen patient and recommends MRI to better evaluate. MRI done and result noted as below. All other ROS negative except noted in HPI    Past, Family, Social History unchanged from admission. Diet:  ADULT DIET;  Regular; 3 carb choices (45 gm/meal)    Medications:  Scheduled Meds:   insulin lispro  10 Units SubCUTAneous TID WC    insulin glargine  25 Units SubCUTAneous Nightly    insulin lispro  0-6 Units SubCUTAneous 2 times per day    insulin lispro  0-12 Units SubCUTAneous TID WC    enoxaparin  1 mg/kg SubCUTAneous BID    losartan  50 mg Oral Daily    aspirin  81 mg Oral Daily    escitalopram  10 mg Oral Daily    FLUoxetine  40 mg Oral Daily    metoprolol succinate  25 mg Oral Daily    pantoprazole  40 mg Oral QAM AC    atorvastatin  40 mg Oral Nightly    sodium chloride flush  5-40 mL IntraVENous 2 times per day    sennosides-docusate sodium  1 tablet Oral BID    vancomycin  1,250 mg IntraVENous Q12H    piperacillin-tazobactam  3,375 mg IntraVENous Q6H     Continuous Infusions:   sodium chloride 150 mL/hr at 04/21/22 0908    sodium chloride      dextrose       PRN Meds:sodium chloride flush, sodium chloride, polyethylene glycol, acetaminophen **OR** acetaminophen, ondansetron **OR** ondansetron, glucose, dextrose, glucagon (rDNA), dextrose, HYDROcodone 5 mg - acetaminophen **OR** HYDROcodone 5 mg - acetaminophen    OBJECTIVE:  CBC:   Recent Labs     04/19/22  0614 04/20/22  0850   WBC 10.9* 9.5   HGB 8.8* 9.8*   * 636*     BMP:    Recent Labs     04/18/22  2149 04/19/22  2894 04/20/22  0024 NA  --  133*  --    K  --  4.4  --    CL  --  100  --    CO2  --  24  --    BUN  --  9  --    CREATININE  --  0.8* 0.6*   GLUCOSE 212 191*  --      Calcium:  Recent Labs     04/19/22  0614   CALCIUM 7.7*     Ionized Calcium:No results for input(s): IONCA in the last 72 hours. Magnesium:No results for input(s): MG in the last 72 hours. Phosphorus:No results for input(s): PHOS in the last 72 hours. BNP:No results for input(s): BNP in the last 72 hours. Glucose:  Recent Labs     04/21/22  0224 04/21/22  0755 04/21/22  1113   POCGLU 126* 132* 147*     HgbA1C:   No results for input(s): LABA1C in the last 72 hours. INR:   Recent Labs     04/19/22  0614   INR 1.14     Hepatic:   Recent Labs     04/19/22  0614   ALKPHOS 78  78   ALT 6*  6*   AST 9*  9*   PROT 7.1  7.1   BILITOT 0.4  0.4   BILIDIR 0.2   LABALBU 2.5*  2.5*     Amylase and Lipase:No results for input(s): LACTA, AMYLASE in the last 72 hours. Lactic Acid: No results for input(s): LACTA in the last 72 hours. Troponin: No results for input(s): CKTOTAL, CKMB, TROPONINT in the last 72 hours. BNP: No results for input(s): BNP in the last 72 hours. Lipids: No results for input(s): CHOL, TRIG, HDL, LDL, LDLCALC in the last 72 hours.   ABGs: No results found for: PH, PCO2, PO2, HCO3, O2SAT    Radiology reports as per the Radiologist  Radiology:   Echocardiogram complete 2D with doppler with color  Result Date: 4/19/2022  Transthoracic Echocardiography Report (TTE)  Demographics   Patient Name       Azeb Mejia      Date of Study       04/19/2022   Date of Birth      1980         Gender              Male   Age                39 year(s)         Race                   Patient Number     [id number]         Room Number         2025   Visit Number       [id number]   Corporate ID       [id number]   Accession Number   [id number]          Bette Lara RVT Ordering Physician Ankit Chacon PA-C         Physician           MD  Procedure Type of Study   TTE procedure:ECHOCARDIOGRAM COMPLETE 2D W DOPPLER W COLOR. Procedure Date Date: 04/19/2022 Start: 10:32 AM Study Location: Portable Technical Quality: Adequate visualization Indications:Dyspnea/SOB. Patient Status: Routine Height: 72 inches Weight: 170 pounds BSA: 1.99 m2 BMI: 23.06 kg/m2 HR: 81 bpm  Conclusions   Summary  Left ventricular systolic function is normal.  Ejection fraction is visually estimated at 55%. No significant valvular disease noted. No evidence of any pericardial effusion. Signature   ------------------------------------------------------------------  Electronically signed by Judy Hackett MD (Interpreting  physician) on 04/19/2022 at 12:42 PM  ------------------------------------------------------------------   Findings   Left Ventricle  Left ventricular systolic function is normal.  Ejection fraction is visually estimated at 55%. Normal diastolic function. Left ventricle size is normal.  No regional wall motion abnormalities. Left Atrium  Mildly dilated left atrium. Right Atrium  Essentially normal right atrium. Right Ventricle  Essentially normal right ventricle. Aortic Valve  Structurally normal aortic valve. Mitral Valve  Structurally normal mitral valve. Tricuspid Valve  Structurally normal tricuspid valve. Pulmonic Valve  The pulmonic valve was not well visualized. Pericardial Effusion  No evidence of any pericardial effusion. Pleural Effusion  No evidence of pleural effusion. Miscellaneous  Inferior vena cava is dilated, measuring at 2.2 cm, but does collapse with  respiration. Aorta was not clearly visualized.   M-Mode/2D Measurements & Calculations       XR FOOT LEFT (MIN 3 VIEWS)  Result Date: 4/18/2022  EXAMINATION: THREE XRAY VIEWS OF THE LEFT FOOT 4/18/2022 12:04 pm COMPARISON: 06/12/2021 HISTORY: ORDERING SYSTEM PROVIDED HISTORY: left foot pain TECHNOLOGIST PROVIDED HISTORY: Reason for exam:->left foot pain Reason for Exam: wound infection FINDINGS: Extensive bony destruction has developed throughout the proximal phalanx 1st digit, 1st metatarsal head and base of the distal phalanx 1st digit. There is diffuse osteopenia and osteolysis in the 1st metatarsal extending into the base with superimposed pathologic fracture. Chronic appearing periosteal reaction is present in the 2nd metatarsal. Areas of bony fragmentation are noted in the distal phalanx 2nd digit concerning for acute osteomyelitis. Subtle areas of suspected osteolysis are noted in the 2nd metatarsal head. Remote postoperative changes from 3rd metatarsal amputation. The 4th and 5th digits are intact. Midfoot and hindfoot bones are intact. Interval fragmentation and osteopenia throughout the 1st metatarsal.  Absence or bony destruction of the proximal phalanx 1st digit with diffuse soft tissue swelling. Additional bony destruction in the proximal phalanx. Overall findings concerning for acute osteomyelitis. Correlate for any interval surgery. Bony destruction of the distal tuft, distal phalanx 2nd digit concerning for acute osteomyelitis. Questionable osteomyelitis in the 2nd metatarsal head. XR FOOT RIGHT (MIN 3 VIEWS)  Result Date: 4/18/2022  EXAMINATION: THREE XRAY VIEWS OF THE RIGHT FOOT 4/18/2022 12:27 pm COMPARISON: 01/04/2021 HISTORY: ORDERING SYSTEM PROVIDED HISTORY: ulcer right foot TECHNOLOGIST PROVIDED HISTORY: Reason for exam:->ulcer right foot Reason for Exam: ulcer right foot FINDINGS: Status post transmetatarsal amputation across the forefoot. No radiopaque foreign body in the soft tissues. No acute fracture. No acute periostitis or bony destruction. Linear ulcer adjacent to the 1st metatarsal remnant is identified. Chest No acute periostitis or bony destruction.   Linear ulcer adjacent to the 1st metatarsal remnant. VL DUP LOWER EXTREMITY VENOUS LEFT  Addendum Date: 4/18/2022    ADDENDUM: Critical results were called by Dr. Kath Natarajan to [facility] on 4/18/2022 at 14:11. Result Date: 4/18/2022  EXAMINATION: DUPLEX VENOUS ULTRASOUND OF THE LEFT LOWER EXTREMITY 4/18/2022 1:22 pm TECHNIQUE: Duplex ultrasound using B-mode/gray scaled imaging and Doppler spectral analysis and color flow was obtained of the deep venous structures of the left extremity. COMPARISON: 06/03/2018 HISTORY: ORDERING SYSTEM PROVIDED HISTORY: ro dvt, leg pain TECHNOLOGIST PROVIDED HISTORY: Reason for exam:->ro dvt, leg pain Reason for Exam: wound to left foot, pain to lower leg, swelling from knee down x 3 mos FINDINGS: Occlusive thrombus in 1 of the peroneal veins. The visualized veins of the left lower extremity are otherwise patent and free of echogenic thrombus. The veins demonstrate good compressibility with normal color flow study and spectral analysis. Similar inguinal lymphadenopathy since at least 2021.   1. Occlusive thrombus in 1 of the peroneal veins. 2. Similar inguinal lymphadenopathy since at least 2021. MRI left foot  Acute osteomyelitis involving the entire 1st metatarsal as well as the 1st   digit phalanges, with associated osseous erosions and pathologic fractures.    There is a soft tissue abscess centered at the level of the 1st metatarsal   head measuring approximately 4.5 x 3.4 x 3.7 cm.  Smaller suspected abscess   is seen at the level of the 1st IP joint.       Bone marrow edema is seen in the 2nd-4th metatarsals, all the cuneiform, and   2nd proximal and distal phalanges.  There are associated areas of T1 marrow   replacement.  Acute osteomyelitis should be suspected.       Cellulitis.       Prior amputation of the 3rd digit phalanges and a portion of the 3rd distal   metatarsal.          Physical Exam:  Vitals: BP (!) 151/95   Pulse 70   Temp 98.2 °F (36.8 °C) (Oral)   Resp 19   Ht 6' 0.01\" (1.829 m)   Wt 190 lb 14.7 oz (86.6 kg)   SpO2 98%   BMI 25.89 kg/m²   24 hour intake/output:    Intake/Output Summary (Last 24 hours) at 4/21/2022 1518  Last data filed at 4/21/2022 1013  Gross per 24 hour   Intake --   Output 1050 ml   Net -1050 ml     Last 3 weights: Wt Readings from Last 3 Encounters:   04/18/22 190 lb 14.7 oz (86.6 kg)   04/21/22 170 lb (77.1 kg)   01/24/22 188 lb (85.3 kg)     General appearance - alert, well appearing, and in no distress  HEENT: Normocephalic, Atraumatic, Conjuctiva pink, PERRL, Oral mucosa normal, Lips, teeth and gums normal, Trachea midline, Thyroid normal and No noted lymphadenopathy  Chest - clear to auscultation, no wheezes, rales or rhonchi, symmetric air entry  Cardiovascular - normal rate, regular rhythm, normal S1, S2, no murmurs, rubs, clicks or gallops  Abdomen - soft, nontender, nondistended, no masses or organomegaly   Neurological - Alert and oriented, Normal speech, No focal findings or movement disorder noted and Motor and sensory grossly normal bilaterally  Integumentary - Skin color, texture, turgor normal. No Rashes or lesions  Musculoskeletal -slight left ankle edema. Dressing left foot., Full ROM times 4 extremities and No clubbing or cyanosis  S/ post transmetatarsal amputation on the right foot. DVT prophylaxis: [x] Lovenox                                 [] SCDs                                 [] SQ Heparin                                 [] Encourage ambulation           [] Already on Anticoagulation                 ASSESSMENT / PLAN :    Principal Problem:    Acute osteomyelitis ([address]  Resolved Problems:    * No resolved hospital problems.  *  Jessenia Golden is a 39 y.o. male with a pmh of osteomyelitis, hypertension, uncontrolled type 2 diabetes who presents with osteomyelitis of the left lower extremity x 3 months        # Sepsis secondary to diabetic foot ulcer  #Diabetic foot ulcer   #Acute Osteomylitis of Left foot   # Open wound of left  toe with complication   -X-ray ankle left foot with changes concerning for osteomyelitis in the 1st and 2nd digit. -MRI left foot shows acute osteomyelitis involving entire fifth metatarsal with callus first digit phalanges with associated osseous erosion and pathology fractures, 4.5 x 3.4x3.7 at the level of the first metatarsal head, smaller suspected abscess at the level of the first IP joint  - Met sepsis criteria based on leukocytosis, tachycardia, tachypnea, active source of infection  -Continue Vanco and Zosyn. Wound culture with light growth enterobacter cloaca sensitive to zosyn.   -Await surgery. -CRP pending  -Procalcitonin low. -ID consult surgery    DVT left lower extremity acute          Doppler shows occlusive left peroneal deep venous thrombosis         Risk factors include prolonged immobility, infection. Seen on venous duplex LLE         Continue lovenox 1 mg/kg BID and plan to change to NOAC after surgery. Uncontrolled Insulin dependent type 2 diabetes  Type 2 Diabetes with complication             Continue current insulin regimen             Endocrine following. Blood sugars better. Holding home oral antihyperglycemics. HgA1c 10.4 showing baseline poor control     Hyponatremia           Improved from 127-133           Repeat in the morning, no labs today.                   Essential hypertension       Continue home medications       Stable    Hyperlipidemia   On lipitor            DVT prophylaxis: Lovenox    CODE STATUS: Full code    Surrogate decision maker:  Mother.       Electronically signed by Kassandra Huddleston MD on 4/21/2022 at 3:18 PM    [facility]

## 2022-04-21 NOTE — PROGRESS NOTES
Spoke with Dr Mandi Archibald over the phone regarding plans for surgery. Dr Mandi Archibald stated that he is not going to do surgery today, he wants to check the MRI done today and that the pt can eat.

## 2022-04-21 NOTE — PROGRESS NOTES
Patient was seen earlier this a.m. in consultation. Dictation to follow  Scheduling MRI of the left foot for further evaluation.   CPM.

## 2022-04-21 NOTE — PROGRESS NOTES
RN called MRI about pts routine MRI order that was put in at 0900 wanting to make it a STAT scan. I informed RN that if it is STAT it needs to be ordered that way, then an MRI screening form in the pts chart needs to be completed before we can bring pt to dept for his exam. When screening form is completed we will be able to send for pt for exam when table becomes available.

## 2022-04-21 NOTE — CONSULTS
[facility]  consulted by KAMI Sheehan for monitoring and adjustment. Indication for treatment: Sepsis secondary to Osteomylitis of left lower extremity   Open wound of left  toe with complication    Goal trough: [] 10-15 mcg/mL or [x] 15-20 mcg/ml  AUC/LIDIA: [] <500 or [x] 400-600    Pertinent Laboratory Values:   Temp Readings from Last 3 Encounters:   04/21/22 98.1 °F (36.7 °C) (Oral)   11/04/21 98.1 °F (36.7 °C) (Temporal)   08/26/21 97.3 °F (36.3 °C) (Temporal)     Recent Labs     04/19/22  0614 04/20/22  0024 04/20/22  0850   WBC 10.9*  --  9.5   LACTATE  --  0.8  --      Recent Labs     04/19/22  0614 04/20/22  0024 04/21/22  1429   BUN 9  --  5*   CREATININE 0.8* 0.6* 0.7*     Estimated Creatinine Clearance: 152 mL/min (A) (based on SCr of 0.7 mg/dL (L)).     Intake/Output Summary (Last 24 hours) at 4/21/2022 1632  Last data filed at 4/21/2022 1013  Gross per 24 hour   Intake --   Output 1050 ml   Net -1050 ml     Vancomycin level:   TROUGH:    Recent Labs     04/20/22  0024   VANCOTROUGH 13.6     RANDOM:    Recent Labs     04/21/22  1429   VANCORANDOM 17.0       Assessment:  · SCr, BUN, and urine output:  · Scr trends are stable  · BUN improved  · Continue to monitor closely  · Day(s) of therapy: 4  · Vancomycin concentration:   · 4/20:  13.6, 10h post-dose, , therapeutic (1250 q12h)  · 4/21: 17, 5.5h post-dose, , therapeutic (1250 q12h)    Plan:  · Continue Vancomycin 1250mg IV every 12 hours  · Predicted trough: 12.2,   · Repeat next level in 72h  · Due to IV line incompatibility, Zosyn was adjusted to 3.375gm ivpb q6h (each dose to be infused over 30 minutes)  · Pharmacy will continue to monitor patient and adjust therapy as indicated    VANCOMYCIN CONCENTRATION SCHEDULED FOR 4/24 @ 1300    Thank you for the consult,  SHARON Farmer [facility], PharmD  4/21/2022 4:32 PM

## 2022-04-22 ENCOUNTER — ANESTHESIA EVENT (OUTPATIENT)
Dept: OPERATING ROOM | Age: 42
DRG: 710 | End: 2022-04-22
Payer: COMMERCIAL

## 2022-04-22 PROBLEM — L02.612 ABSCESS OF LEFT FOOT: Status: ACTIVE | Noted: 2022-04-22

## 2022-04-22 PROBLEM — L03.116 CELLULITIS OF LEFT FOOT: Status: ACTIVE | Noted: 2022-04-22

## 2022-04-22 PROBLEM — M86.272 SUBACUTE OSTEOMYELITIS OF LEFT FOOT (HCC): Status: ACTIVE | Noted: 2022-04-22

## 2022-04-22 LAB
ANION GAP SERPL CALCULATED.3IONS-SCNC: 9 MMOL/L (ref 4–16)
BUN BLDV-MCNC: 5 MG/DL (ref 6–23)
CALCIUM SERPL-MCNC: 8 MG/DL (ref 8.3–10.6)
CHLORIDE BLD-SCNC: 102 MMOL/L (ref 99–110)
CO2: 26 MMOL/L (ref 21–32)
CREAT SERPL-MCNC: 0.7 MG/DL (ref 0.9–1.3)
GFR AFRICAN AMERICAN: >60 ML/MIN/1.73M2
GFR NON-AFRICAN AMERICAN: >60 ML/MIN/1.73M2
GLUCOSE BLD-MCNC: 171 MG/DL (ref 70–99)
GLUCOSE BLD-MCNC: 182 MG/DL (ref 70–99)
GLUCOSE BLD-MCNC: 184 MG/DL (ref 70–99)
GLUCOSE BLD-MCNC: 235 MG/DL (ref 70–99)
GLUCOSE BLD-MCNC: 82 MG/DL (ref 70–99)
POTASSIUM SERPL-SCNC: 4.2 MMOL/L (ref 3.5–5.1)
SODIUM BLD-SCNC: 137 MMOL/L (ref 135–145)

## 2022-04-22 PROCEDURE — 2580000003 HC RX 258: Performed by: NURSE PRACTITIONER

## 2022-04-22 PROCEDURE — 94761 N-INVAS EAR/PLS OXIMETRY MLT: CPT

## 2022-04-22 PROCEDURE — 2500000003 HC RX 250 WO HCPCS: Performed by: NURSE PRACTITIONER

## 2022-04-22 PROCEDURE — 6370000000 HC RX 637 (ALT 250 FOR IP): Performed by: NURSE PRACTITIONER

## 2022-04-22 PROCEDURE — 2060000000 HC ICU INTERMEDIATE R&B

## 2022-04-22 PROCEDURE — APPSS60 APP SPLIT SHARED TIME 46-60 MINUTES: Performed by: NURSE PRACTITIONER

## 2022-04-22 PROCEDURE — 6360000002 HC RX W HCPCS: Performed by: NURSE PRACTITIONER

## 2022-04-22 PROCEDURE — 6370000000 HC RX 637 (ALT 250 FOR IP): Performed by: INTERNAL MEDICINE

## 2022-04-22 PROCEDURE — 99255 IP/OBS CONSLTJ NEW/EST HI 80: CPT | Performed by: INTERNAL MEDICINE

## 2022-04-22 PROCEDURE — 82962 GLUCOSE BLOOD TEST: CPT

## 2022-04-22 PROCEDURE — 36415 COLL VENOUS BLD VENIPUNCTURE: CPT

## 2022-04-22 PROCEDURE — 80048 BASIC METABOLIC PNL TOTAL CA: CPT

## 2022-04-22 RX ORDER — METRONIDAZOLE 500 MG/100ML
500 INJECTION, SOLUTION INTRAVENOUS EVERY 8 HOURS
Status: DISCONTINUED | OUTPATIENT
Start: 2022-04-22 | End: 2022-04-27

## 2022-04-22 RX ADMIN — SODIUM CHLORIDE: 9 INJECTION, SOLUTION INTRAVENOUS at 14:59

## 2022-04-22 RX ADMIN — CEFEPIME HYDROCHLORIDE 2000 MG: 2 INJECTION, POWDER, FOR SOLUTION INTRAVENOUS at 17:47

## 2022-04-22 RX ADMIN — ESCITALOPRAM OXALATE 10 MG: 10 TABLET ORAL at 08:34

## 2022-04-22 RX ADMIN — SENNOSIDES AND DOCUSATE SODIUM 1 TABLET: 50; 8.6 TABLET ORAL at 20:51

## 2022-04-22 RX ADMIN — METRONIDAZOLE 500 MG: 500 INJECTION, SOLUTION INTRAVENOUS at 16:44

## 2022-04-22 RX ADMIN — PIPERACILLIN AND TAZOBACTAM 3375 MG: 3; .375 INJECTION, POWDER, LYOPHILIZED, FOR SOLUTION INTRAVENOUS at 12:35

## 2022-04-22 RX ADMIN — INSULIN LISPRO 2 UNITS: 100 INJECTION, SOLUTION INTRAVENOUS; SUBCUTANEOUS at 18:42

## 2022-04-22 RX ADMIN — METRONIDAZOLE 500 MG: 500 INJECTION, SOLUTION INTRAVENOUS at 23:45

## 2022-04-22 RX ADMIN — VANCOMYCIN HYDROCHLORIDE 1250 MG: 10 INJECTION, POWDER, LYOPHILIZED, FOR SOLUTION INTRAVENOUS at 15:05

## 2022-04-22 RX ADMIN — ASPIRIN 81 MG: 81 TABLET, COATED ORAL at 08:34

## 2022-04-22 RX ADMIN — LOSARTAN POTASSIUM 50 MG: 25 TABLET, FILM COATED ORAL at 08:35

## 2022-04-22 RX ADMIN — METOPROLOL SUCCINATE 25 MG: 25 TABLET, EXTENDED RELEASE ORAL at 08:34

## 2022-04-22 RX ADMIN — INSULIN LISPRO 4 UNITS: 100 INJECTION, SOLUTION INTRAVENOUS; SUBCUTANEOUS at 08:36

## 2022-04-22 RX ADMIN — ENOXAPARIN SODIUM 90 MG: 100 INJECTION SUBCUTANEOUS at 12:31

## 2022-04-22 RX ADMIN — INSULIN LISPRO 1 UNITS: 100 INJECTION, SOLUTION INTRAVENOUS; SUBCUTANEOUS at 20:57

## 2022-04-22 RX ADMIN — SODIUM CHLORIDE, PRESERVATIVE FREE 10 ML: 5 INJECTION INTRAVENOUS at 20:54

## 2022-04-22 RX ADMIN — PANTOPRAZOLE SODIUM 40 MG: 40 TABLET, DELAYED RELEASE ORAL at 08:34

## 2022-04-22 RX ADMIN — SODIUM CHLORIDE, PRESERVATIVE FREE 10 ML: 5 INJECTION INTRAVENOUS at 08:36

## 2022-04-22 RX ADMIN — SENNOSIDES AND DOCUSATE SODIUM 1 TABLET: 50; 8.6 TABLET ORAL at 08:34

## 2022-04-22 RX ADMIN — PIPERACILLIN AND TAZOBACTAM 3375 MG: 3; .375 INJECTION, POWDER, LYOPHILIZED, FOR SOLUTION INTRAVENOUS at 01:50

## 2022-04-22 RX ADMIN — ATORVASTATIN CALCIUM 40 MG: 40 TABLET, FILM COATED ORAL at 20:51

## 2022-04-22 RX ADMIN — FLUOXETINE 40 MG: 10 CAPSULE ORAL at 08:35

## 2022-04-22 RX ADMIN — SODIUM CHLORIDE: 9 INJECTION, SOLUTION INTRAVENOUS at 22:52

## 2022-04-22 RX ADMIN — INSULIN LISPRO 10 UNITS: 100 INJECTION, SOLUTION INTRAVENOUS; SUBCUTANEOUS at 08:37

## 2022-04-22 RX ADMIN — PIPERACILLIN AND TAZOBACTAM 3375 MG: 3; .375 INJECTION, POWDER, LYOPHILIZED, FOR SOLUTION INTRAVENOUS at 06:47

## 2022-04-22 RX ADMIN — VANCOMYCIN HYDROCHLORIDE 1250 MG: 10 INJECTION, POWDER, LYOPHILIZED, FOR SOLUTION INTRAVENOUS at 05:07

## 2022-04-22 ASSESSMENT — PAIN SCALES - GENERAL
PAINLEVEL_OUTOF10: 0

## 2022-04-22 ASSESSMENT — PAIN DESCRIPTION - LOCATION: LOCATION: FOOT

## 2022-04-22 NOTE — PROGRESS NOTES
Progress Note( Dr. Stanley Cleaning)  4/22/2022  Subjective:   Admit Date: 4/18/2022  PCP: Issac Saldivar MD        Admitted For :Infection of the left foot possible osteomyelitis    Consulted For: Better control of blood glucose    Interval History: Feels lot better. Patient had debridement of done of the left foot yesterday at [facility] to go for partial amputation of left foot submitted tomorrow    Denies any chest pains,   Denies SOB . Denies nausea or vomiting. No new bowel or bladder symptoms. Intake/Output Summary (Last 24 hours) at 4/22/2022 1911  Last data filed at 4/22/2022 0156  Gross per 24 hour   Intake --   Output 1575 ml   Net -1575 ml       DATA    CBC:   Recent Labs     04/20/22  0850 04/21/22  1429   WBC 9.5  --    HGB 9.8* 9.6*   *  --     CMP:  Recent Labs     04/20/22  0024 04/21/22  1429 04/22/22  0412   NA  --  135 137   K  --  4.4 4.2   CL  --  99 102   CO2  --  28 26   BUN  --  5* 5*   CREATININE 0.6* 0.7* 0.7*   CALCIUM  --  8.2* 8.0*     Lipids:   Lab Results   Component Value Date    CHOL 108 03/13/2021    CHOL 142 03/12/2020    HDL 27 03/13/2021    TRIG 178 03/13/2021     Glucose:  Recent Labs     04/22/22  0801 04/22/22  1151 04/22/22  1751   POCGLU 235* 82 184*     [id number]:  Lab Results   Component Value Date    LABA1C 10.4 04/18/2022     High Sensitivity TSH:   Lab Results   Component Value Date    TSHHS 1.770 03/12/2021     Free T3: No results found for: FT3  Free T4:No results found for: T4FREE    MRI FOOT LEFT W WO CONTRAST   Preliminary Result   Acute osteomyelitis involving the entire 1st metatarsal as well as the 1st   digit phalanges, with associated osseous erosions and pathologic fractures. There is a soft tissue abscess centered at the level of the 1st metatarsal   head measuring approximately 4.5 x 3.4 x 3.7 cm. Smaller suspected abscess   is seen at the level of the 1st IP joint.       Bone marrow edema is seen in the 2nd-4th metatarsals, all the cuneiform, and   2nd proximal and distal phalanges. There are associated areas of T1 marrow   replacement. Acute osteomyelitis should be suspected. Cellulitis. Prior amputation of the 3rd digit phalanges and a portion of the 3rd distal   metatarsal.         VL DUP LOWER EXTREMITY VENOUS LEFT   Final Result   Addendum 1 of 1   ADDENDUM:   Critical results were called by Dr. Dariusz Nelson to [facility] on    4/18/2022   at 14:11. Final   1. Occlusive thrombus in 1 of the peroneal veins. 2. Similar inguinal lymphadenopathy since at least 2021. XR FOOT RIGHT (MIN 3 VIEWS)   Final Result   Chest No acute periostitis or bony destruction. Linear ulcer adjacent to the   1st metatarsal remnant. XR FOOT LEFT (MIN 3 VIEWS)   Final Result   Interval fragmentation and osteopenia throughout the 1st metatarsal.  Absence   or bony destruction of the proximal phalanx 1st digit with diffuse soft   tissue swelling. Additional bony destruction in the proximal phalanx. Overall findings concerning for acute osteomyelitis. Correlate for any   interval surgery. Bony destruction of the distal tuft, distal phalanx 2nd digit concerning for   acute osteomyelitis. Questionable osteomyelitis in the 2nd metatarsal head.               Scheduled Medicines   Medications:    cefepime  2,000 mg IntraVENous Q8H    metroNIDAZOLE  500 mg IntraVENous Q8H    insulin lispro  10 Units SubCUTAneous TID WC    insulin glargine  25 Units SubCUTAneous Nightly    insulin lispro  0-6 Units SubCUTAneous 2 times per day    insulin lispro  0-12 Units SubCUTAneous TID WC    enoxaparin  1 mg/kg SubCUTAneous BID    losartan  50 mg Oral Daily    aspirin  81 mg Oral Daily    escitalopram  10 mg Oral Daily    FLUoxetine  40 mg Oral Daily    metoprolol succinate  25 mg Oral Daily    pantoprazole  40 mg Oral QAM AC    atorvastatin  40 mg Oral Nightly    sodium chloride flush  5-40 mL IntraVENous 2 times per day    sennosides-docusate sodium  1 tablet Oral BID    vancomycin  1,250 mg IntraVENous Q12H      Infusions:    sodium chloride 150 mL/hr at 04/22/22 1459    sodium chloride      dextrose           Objective:   Vitals: BP (!) 153/94   Pulse 74   Temp 98.1 °F (36.7 °C) (Oral)   Resp 15   Ht 6' 0.01\" (1.829 m)   Wt 190 lb 14.7 oz (86.6 kg)   SpO2 98%   BMI 25.89 kg/m²   General appearance: alert and cooperative with exam  Neck: no JVD or bruit  Thyroid : Normal lobes   Lungs: Has Vesicular Breath sounds   Heart:  regular rate and rhythm  Abdomen: soft, non-tender; bowel sounds normal; no masses,  no organomegaly  Musculoskeletal: Normal  Extremities: extremities normal, , no edema right foot partial amputation left foot amputation of third toe and now has cellulitis and possible osteomyelitis of left foot  Neurologic:  Awake, alert, oriented to name, place and time. Cranial nerves II-XII are grossly intact. Motor is  intact. Sensory neuropathy. ,  and gait is abnormal.  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 ([facility])     WD-Diabetic ulcer of toe of left foot associated with type 2 diabetes mellitus, with necrosis of muscle ([facility])     WD-Diabetic ulcer of right midfoot associated with type 2 diabetes mellitus, with fat layer exposed ([address]     Diabetic foot ([facility])     Moderate nonproliferative diabetic retinopathy of both eyes without macular edema associated with type 2 diabetes mellitus ([address]     Acute osteomyelitis ([address]     Acute osteomyelitis of foot (HCC)     Abscess of left foot Cellulitis of left foot     Subacute osteomyelitis of left foot ([facility]      Plan:     1. Reviewed POC blood glucose . Labs and X ray results   2. Reviewed Current Medicines   3. On meal/ Correction bolus Humalog/ Basal Lantus Insulin regime   4. Monitor Blood glucose frequently   5. Modified  the dose of Insulin/ other medicines as needed   6. Will follow     .      Katherine Ramírez MD, MD

## 2022-04-22 NOTE — FLOWSHEET NOTE
04/22/22 1446   Encounter Summary   Encounter Overview/Reason  Initial Encounter   Service Provided For: Patient   Referral/Consult From: Rounding   Support System Parent   Complexity of Encounter Low   Encounter    Type Initial Screen/Assessment   Spiritual/Emotional needs   Type Spiritual Support   Assessment/Intervention/Outcome   Assessment Calm;Coping   Intervention Active listening;Sustaining Presence/Ministry of presence   Outcome Coping   Plan and Referrals   Plan/Referrals No future visits requested

## 2022-04-22 NOTE — PROGRESS NOTES
Patient was seen and examined earlier this a.m. on rounds. Afebrile hemodynamically stable. Reporting no pain in the left foot secondary to diabetic neuropathy. Left foot cellulitis and swelling has lessened. The MRI of the left foot did reveal an abscess near his first metatarsal phalangeal joint area and at the bedside I was able to perform an I&D of this area and the tract does go from the dorsum of the foot to the plantar surface. I then packed this with iodoform gauze. Unfortunately, he is got multiple bony fragments along with erosions and osteomyelitis in the first metatarsal head which will require amputation of that left great toe. I will get his surgery scheduled for early tomorrow morning. We will continue IV antibiotics. I will get his consent signed and placed in the chart and he does agree to proceed with surgical intervention. R/B/A's discussed. NPO p MN.

## 2022-04-22 NOTE — CONSULTS
[facility]  consulted by Cheryle London, APRN-CNP for monitoring and adjustment. Indication for treatment: Sepsis secondary to Osteomylitis of left lower extremity   Open wound of left  toe with complication    Goal trough: [] 10-15 mcg/mL or [x] 15-20 mcg/ml  AUC/LIDIA: [] <500 or [x] 400-600    Pertinent Laboratory Values:   Temp Readings from Last 3 Encounters:   04/22/22 98 °F (36.7 °C) (Oral)   11/04/21 98.1 °F (36.7 °C) (Temporal)   08/26/21 97.3 °F (36.3 °C) (Temporal)     Recent Labs     04/20/22  0024 04/20/22  0850   WBC  --  9.5   LACTATE 0.8  --      Recent Labs     04/20/22  0024 04/21/22  1429 04/22/22  0412   BUN  --  5* 5*   CREATININE 0.6* 0.7* 0.7*     Estimated Creatinine Clearance: 152 mL/min (A) (based on SCr of 0.7 mg/dL (L)).     Intake/Output Summary (Last 24 hours) at 4/22/2022 1022  Last data filed at 4/22/2022 0156  Gross per 24 hour   Intake --   Output 1575 ml   Net -1575 ml     Vancomycin level:   TROUGH:    Recent Labs     04/20/22  0024   VANCOTROUGH 13.6     RANDOM:    Recent Labs     04/21/22  1429   VANCORANDOM 17.0       Assessment:  · SCr, BUN, and urine output:  · Scr trends are stable  · BUN improved, WNL  · Continue to monitor closely  · Day(s) of therapy: 5  · Vancomycin concentration:   · 4/20:  13.6, 10h post-dose, , therapeutic (1250 q12h)  · 4/21: 17, 5.5h post-dose, , therapeutic (1250 q12h)  · 4/24:  Random @ 1300    Plan:  · Continue Vancomycin 1250mg IV every 12 hours  · Predicted trough: 12.2,   · Repeat next level in 2 days  · Due to IV line incompatibility, Zosyn was adjusted to 3.375gm ivpb q6h (each dose to be infused over 30 minutes)  · Pharmacy will continue to monitor patient and adjust therapy as indicated    VANCOMYCIN CONCENTRATION SCHEDULED FOR 4/24 @ 1300    Thank you for the consult,  Trey Hooks [facility]  4/22/2022 10:22 AM

## 2022-04-22 NOTE — CARE COORDINATION
Spoke with Angela from [facility]. She requested for CM to make referral to [facility] office.  Phoned referral to Christiano at  [facility] and faxed face sheet and H/P and ID consult

## 2022-04-22 NOTE — CONSULTS
Infectious Disease Consult Note  2022   Patient Name: Riri Ferrera : 1980   Impression   Left DFU with OM:    · Right Plantar DFU:    · Acute Thrombus of the Left Peroneal Vein:    · Temp max 100.6 has trended down to afebrile  · Leukocytosis max 14.7 trended down to normal  · -BC 0/2-NGTD  · CRP 56.6  · -MRSA/MSSA screen negative  · -Left Foot ulcer culture: Enterobacter cloacae  · -XR Foot Right: no acute periostitis or bony destruction. Linear ulcer adjacent to the 1st metatarsal remnant. · -VL Dup LLE: occlusive thrombus in 1 of the peroneal veins. Small inguinal lymphadenopathy since at least . · -Left Foot XR: imp of questionable OM  · -MRI Foot Left W WO Contrast: Acute OM involving the entire 1st metatarsal as well as the 1st digit phalanges, with associated osseous erosions and pathologic fractures. There is a soft tissue abscess centered at the level of the 1st metatarsal head measuring approx 4.5 x 3.4 x 3.7 cm. Smaller suspected abscess is seen at the level of the 1st IP joint. Bone marrow edema is seen in the 2nd-4th metatarsals, all the cuneform, and 2nd proximal and distal phalanges. There are associated areas of T1 marrow replacement. Acute OM should be suspected. Cellulitis. Prior amp of the 3rd digit phalanges and a portion of the 3rd distal metatarsal.  · Dr. Wong Perera, cardiology, onboard to manage DVT. Rec Lovenox and transition to NOAC. · -Complete Echo: EF 55%, no mention of RV strain.    · -s/p per Dr. Candida Saravia, bedside debridement of left foot  · Dr. Candida Saravia onboard, plan for surgical intervention of left foot tentative -amputation left hallux     DMII, Uncontrolled:  · Peripheral Neuropathy:  · Dr. Ivonne Maya onboard  · - HbAIC 10.4    · HTN     · HLD    · Lumbar Radiculopathy    · H/o Septic Embolism      Multi-morbidity: per PMHx:  Bilateral inguinal hernia repair , lumbar laminectomy , right toe amputation 1/21.    Plan:   Continue IV vancomycin per pharmacy dosing  · DC IV Zosyn as the combination with vancomycin can place patient at risk for GASTON  · Start IV cefepime 2 gm q8h  · Start IV Flagyl 500 mg q8h    Trend CRP, ordered  · Ordered ESR  · Reviewed sensi to E.cloacae wound culture  · Follow with Dr. Malinda Dominique, pending left hallux amputation 4/23, will await surgical cultures and histopathology to evaluate margins for residual OM to determine duration of therapy. Thank you for allowing me to consult in the care of this patient.  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 States he has had issues with ulcers on both feet for about 3 months. He reports he noticed a foul smell from his left foot as well as increased erythema and edema. He has had a past amputation of his right toe in 2021. ? Infectious diseases service was consulted to evaluate the pt, and recommend further investigative and therapeutic measures. ROS: Other systems reviewed Including eyes, ENT, respiratory, cardiovascular, GI, , dermatologic, neurologic, psych, hem/lymphatic, musculoskeletal and endocrine were negative other than what is mentioned above.      Patient Active Problem List    Diagnosis Date Noted    Acute osteomyelitis ([address] 04/18/2022    Acute osteomyelitis of foot ([address]     Moderate nonproliferative diabetic retinopathy of both eyes without macular edema associated with type 2 diabetes mellitus ([address] 01/10/2022    WD-Diabetic ulcer of toe of left foot associated with type 2 diabetes mellitus, with necrosis of muscle ([address] 11/05/2021    WD-Diabetic ulcer of right midfoot associated with type 2 diabetes mellitus, with fat layer exposed ([address] 11/05/2021    Diabetic foot ([address]     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 Jace Kitchen Diabetic ulcer of left midfoot associated with type 2 diabetes mellitus, with necrosis of muscle ([address] 6/7/2021    Diabetic ulcer of right midfoot associated with type 2 diabetes mellitus, with fat layer exposed ([address] 8/11/2020    Dizziness     positional    Essential hypertension     Follows with PCP    Hyperlipidemia     Lumbar radiculopathy     Septic embolism ([address] 6/13/2020      Past Surgical History:   Procedure Laterality Date    ACHILLES TENDON SURGERY Right 1/8/2021    RIGHT ACHILLES TENDON LENGTHENING REPAIR performed by Dagoberto Perkins DPM at [id number]  03/2018    [address] [place] [address] 51    DENTAL SURGERY      teeth extractions -half per patient    HERNIA REPAIR Bilateral 5/27/2020    BIALTERAL HERNIA INGUINAL REPAIR performed by Bibi Gunn MD at [address] [place] [address]  2018    [place] OFFICE/OUTPT VISIT,PROCEDURE ONLY Left 4/17/2018    L5-S1 HEMILAMINECTOMY, REMOVAL OF DISC LEFT SIDE performed by Leonidas Matthews MD at [facility] Right 1/8/2021    RIGHT TRANSMETATARSAL TOE AMPUTATION performed by Dagoberto Perkins DPM at [address] [place] [address]        Family History   Problem Relation Age of Onset    Heart Disease Father     Diabetes Father     Diabetes Mother     Heart Disease Mother    [employer] Other Mother     Kidney Disease Mother     Heart Attack Mother       Infectious disease related family history - not contibutory. 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  No recent travel of significance.  No recent unusual exposures.  NO pets   ? ALLERGIES  No Known Allergies   MEDICATIONS  Reviewed and are per the chart/EMR. ? Antibiotics:   Present:  Cefepime 4/22-  Flagyl 4/22-  Vancomycin 4/18-    Past:  Zosyn 4/18-22?  -------------------------------------------------------------------------------------------------------------------    Vital Signs:  Vitals:    04/22/22 0834   BP: (!) 145/92   Pulse: 73   Resp:    Temp:    SpO2:          Exam:    VS: noted; wt 170 lb (77.1 kg) Height 6'  Gen: alert and oriented X3, no distress  Skin: no stigmata of endocarditis  Wounds: C/D/I left foot with surrounding erythema and edema from ulceration. Right foot with plantar ulceration, no drainage or erythema noted around site. HEMT: AT/NC Oropharynx pink, moist, and without lesions or exudates; dentition in good state of repair  Eyes: PERRLA, EOMI, conjunctiva pink, sclera anicteric. Neck: Supple. Trachea midline. No LAD. Chest: no distress and CTA. Good air movement. Room air. Heart: NSR and no MRG. Abd: soft, non-distended, no tenderness, no hepatomegaly. Normoactive bowel sounds. Ext: no clubbing, cyanosis, or edema, see wounds above  Neuro: Mental status intact. CN 2-12 intact and no focal sensory or motor deficits    ? Diagnostic Studies: reviewed  4/18/22 XR Foot Left:  Impression   Interval fragmentation and osteopenia throughout the 1st metatarsal.  Absence   or bony destruction of the proximal phalanx 1st digit with diffuse soft   tissue swelling.  Additional bony destruction in the proximal phalanx. Overall findings concerning for acute osteomyelitis.  Correlate for any   interval surgery.       Bony destruction of the distal tuft, distal phalanx 2nd digit concerning for   acute osteomyelitis.  Questionable osteomyelitis in the 2nd metatarsal head. 4/18/22 XR Foot Right:  Impression   Chest No acute periostitis or bony destruction.  Linear ulcer adjacent to the   1st metatarsal remnant. 4/18/22 VL Dup Lower Extremity Venous Left:  Impression   1. Occlusive thrombus in 1 of the peroneal veins. 2. Similar inguinal lymphadenopathy since at least 2021.     4/21/22 MRI Foot Left W WO Contrast:  Impression   Acute osteomyelitis involving the entire 1st metatarsal as well as the 1st   digit phalanges, with associated osseous erosions and pathologic fractures. There is a soft tissue abscess centered at the level of the 1st metatarsal   head measuring approximately 4.5 x 3.4 x 3.7 cm.  Smaller suspected abscess   is seen at the level of the 1st IP joint.       Bone marrow edema is seen in the 2nd-4th metatarsals, all the cuneiform, and   2nd proximal and distal phalanges.  There are associated areas of T1 marrow   replacement.  Acute osteomyelitis should be suspected.       Cellulitis.       Prior amputation of the 3rd digit phalanges and a portion of the 3rd distal   metatarsal.     4/18/22 Complete Echo:  Summary   Left ventricular systolic function is normal.   Ejection fraction is visually estimated at 55%. No significant valvular disease noted. No evidence of any pericardial effusion  ? ? I have examined this patient and available medical records on this date and have made the above observations, conclusions and recommendations. Electronically signed by: Electronically signed by Yariel Whitfield.  RAFAEL Fontanez CNP on 4/22/2022 at 8:42 AM

## 2022-04-22 NOTE — CARE COORDINATION
Spoke with patient regarding discharge planning and the need for  Letty Garvey . Patient is agreeable and chose [facility].  Referral made to [facility] via ELISA

## 2022-04-22 NOTE — PROGRESS NOTES
Progress Note( Dr. Emile Richardson)    Subjective:   Admit Date: 4/18/2022  PCP: Sidra Gray MD        Admitted For :Infection of the left foot possible osteomyelitis    Consulted For: Better control of blood glucose    Interval History: Feels lot better. Patient going to have debridement of done of the left foot later in the day by [facility]      Denies any chest pains,   Denies SOB . Denies nausea or vomiting. No new bowel or bladder symptoms. Intake/Output Summary (Last 24 hours) at 4/22/2022 1914  Last data filed at 4/22/2022 0156  Gross per 24 hour   Intake --   Output 1575 ml   Net -1575 ml       DATA    CBC:   Recent Labs     04/20/22  0850 04/21/22  1429   WBC 9.5  --    HGB 9.8* 9.6*   *  --     CMP:  Recent Labs     04/20/22  0024 04/21/22  1429 04/22/22  0412   NA  --  135 137   K  --  4.4 4.2   CL  --  99 102   CO2  --  28 26   BUN  --  5* 5*   CREATININE 0.6* 0.7* 0.7*   CALCIUM  --  8.2* 8.0*     Lipids:   Lab Results   Component Value Date    CHOL 108 03/13/2021    CHOL 142 03/12/2020    HDL 27 03/13/2021    TRIG 178 03/13/2021     Glucose:  Recent Labs     04/22/22  0801 04/22/22  1151 04/22/22  1751   POCGLU 235* 82 184*     [id number]:  Lab Results   Component Value Date    LABA1C 10.4 04/18/2022     High Sensitivity TSH:   Lab Results   Component Value Date    TSHHS 1.770 03/12/2021     Free T3: No results found for: FT3  Free T4:No results found for: T4FREE    MRI FOOT LEFT W WO CONTRAST   Preliminary Result   Acute osteomyelitis involving the entire 1st metatarsal as well as the 1st   digit phalanges, with associated osseous erosions and pathologic fractures. There is a soft tissue abscess centered at the level of the 1st metatarsal   head measuring approximately 4.5 x 3.4 x 3.7 cm. Smaller suspected abscess   is seen at the level of the 1st IP joint. Bone marrow edema is seen in the 2nd-4th metatarsals, all the cuneiform, and   2nd proximal and distal phalanges. There are associated areas of T1 marrow   replacement. Acute osteomyelitis should be suspected. Cellulitis. Prior amputation of the 3rd digit phalanges and a portion of the 3rd distal   metatarsal.         VL DUP LOWER EXTREMITY VENOUS LEFT   Final Result   Addendum 1 of 1   ADDENDUM:   Critical results were called by Dr. Dariusz Nelson to [facility] on    4/18/2022   at 14:11. Final   1. Occlusive thrombus in 1 of the peroneal veins. 2. Similar inguinal lymphadenopathy since at least 2021. XR FOOT RIGHT (MIN 3 VIEWS)   Final Result   Chest No acute periostitis or bony destruction. Linear ulcer adjacent to the   1st metatarsal remnant. XR FOOT LEFT (MIN 3 VIEWS)   Final Result   Interval fragmentation and osteopenia throughout the 1st metatarsal.  Absence   or bony destruction of the proximal phalanx 1st digit with diffuse soft   tissue swelling. Additional bony destruction in the proximal phalanx. Overall findings concerning for acute osteomyelitis. Correlate for any   interval surgery. Bony destruction of the distal tuft, distal phalanx 2nd digit concerning for   acute osteomyelitis. Questionable osteomyelitis in the 2nd metatarsal head.               Scheduled Medicines   Medications:    cefepime  2,000 mg IntraVENous Q8H    metroNIDAZOLE  500 mg IntraVENous Q8H    insulin lispro  10 Units SubCUTAneous TID WC    insulin glargine  25 Units SubCUTAneous Nightly    insulin lispro  0-6 Units SubCUTAneous 2 times per day    insulin lispro  0-12 Units SubCUTAneous TID WC    enoxaparin  1 mg/kg SubCUTAneous BID    losartan  50 mg Oral Daily    aspirin  81 mg Oral Daily    escitalopram  10 mg Oral Daily    FLUoxetine  40 mg Oral Daily    metoprolol succinate  25 mg Oral Daily    pantoprazole  40 mg Oral QAM AC    atorvastatin  40 mg Oral Nightly    sodium chloride flush  5-40 mL IntraVENous 2 times per day    sennosides-docusate sodium  1 tablet Oral BID    vancomycin  1,250 mg IntraVENous Q12H      Infusions:    sodium chloride 150 mL/hr at 04/22/22 1459    sodium chloride      dextrose           Objective:   Vitals: BP (!) 153/94   Pulse 74   Temp 98.1 °F (36.7 °C) (Oral)   Resp 15   Ht 6' 0.01\" (1.829 m)   Wt 190 lb 14.7 oz (86.6 kg)   SpO2 98%   BMI 25.89 kg/m²   General appearance: alert and cooperative with exam  Neck: no JVD or bruit  Thyroid : Normal lobes   Lungs: Has Vesicular Breath sounds   Heart:  regular rate and rhythm  Abdomen: soft, non-tender; bowel sounds normal; no masses,  no organomegaly  Musculoskeletal: Normal  Extremities: extremities normal, , no edema right foot partial amputation left foot amputation of third toe and now has cellulitis and possible osteomyelitis of left foot  Neurologic:  Awake, alert, oriented to name, place and time. Cranial nerves II-XII are grossly intact. Motor is  intact. Sensory neuropathy. ,  and gait is abnormal.  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 ([facility])     WD-Diabetic ulcer of toe of left foot associated with type 2 diabetes mellitus, with necrosis of muscle ([facility])     WD-Diabetic ulcer of right midfoot associated with type 2 diabetes mellitus, with fat layer exposed ([address]     Diabetic foot (HCC)     Moderate nonproliferative diabetic retinopathy of both eyes without macular edema associated with type 2 diabetes mellitus ([address]     Acute osteomyelitis (HCC)     Acute osteomyelitis of foot (HCC)     Abscess of left foot     Cellulitis of left foot     Subacute osteomyelitis of left foot ([facility]      Plan:     1. Reviewed POC blood glucose . Labs and X ray results   2. Reviewed Current Medicines   3. On meal/ Correction bolus Humalog/ Basal Lantus Insulin regime   4. Monitor Blood glucose frequently   5. Modified  the dose of Insulin/ other medicines as needed   6. Will follow     .      Karlyn Lefort, MD, MD

## 2022-04-22 NOTE — ANESTHESIA PRE PROCEDURE
Department of Anesthesiology  Preprocedure Note       Name:  Nikhil Montgomery   Age:  39 y.o.  :  1980                                          MRN:  [id number]         Date:  2022      Surgeon: Miguelina Cartagena):  Rosario Caceres MD    Procedure: Procedure(s):  LEFT RAY TOE AMPUTATION    Medications prior to admission:   Prior to Admission medications    Medication Sig Start Date End Date Taking? Authorizing Provider   losartan (COZAAR) 25 MG tablet Take 1 tablet by mouth daily 22   Lyle Yanez MD   FLUoxetine [facility]) 20 MG capsule Take 2 capsules by mouth daily 22   Lyle Yanez MD   pioglitazone (ACTOS) 30 MG tablet Take 1 tablet by mouth daily 22  Lyle Yanez MD   metoprolol succinate (TOPROL XL) 25 MG extended release tablet Take 1 tablet by mouth daily 22   Lyle Yanez MD   omeprazole (PRILOSEC) 20 MG delayed release capsule Take 1 capsule by mouth once daily in the morning 22   Lyle Yanez MD   metFORMIN (GLUCOPHAGE) 500 MG tablet Take 2 tablets by mouth 2 times daily (with meals) Indications: Start tomorrow /22  Lyle Yanez MD   atorvastatin (LIPITOR) 40 MG tablet Take 1 tablet by mouth nightly 22  Lyle Yanez MD   glyBURIDE (DIABETA) 5 MG tablet Take 2 tablets by mouth 2 times daily (with meals) 22  Lyle Yanez MD   escitalopram (LEXAPRO) 10 MG tablet Take 1 tablet by mouth daily 22   Lyle Yanez MD   hydroCHLOROthiazide (MICROZIDE) 12.5 MG capsule Take 1 capsule by mouth daily 22   Lyle Yanez MD   blood glucose monitor kit and supplies Dispense sufficient amount for indicated testing frequency plus additional to accommodate PRN testing needs. Dispense all needed supplies to include: monitor, strips, lancing device, lancets, control solutions, alcohol swabs.  6/15/20   Liu Trevino MD   blood glucose monitor strips Test 2 times a day & as needed for symptoms of irregular blood glucose. Dispense sufficient amount for indicated testing frequency plus additional to accommodate PRN testing needs. 6/15/20   Lolita Millan MD   FreeStyle Lancets MISC 1 each by Does not apply route daily 6/15/20   Lolita Millan MD   aspirin 81 MG tablet Take 81 mg by mouth daily    Historical Provider, MD   acetaminophen (TYLENOL) 325 MG tablet Take 2 tablets by mouth every 4 hours as needed for Pain or Fever 2/28/18   Arsalan Branch MD       Current medications:    Current Facility-Administered Medications   Medication Dose Route Frequency Provider Last Rate Last Admin    insulin lispro (HUMALOG) injection vial 10 Units  10 Units SubCUTAneous TID  Shayan Hatch MD   10 Units at 04/21/22 1754    insulin glargine (LANTUS) injection vial 25 Units  25 Units SubCUTAneous Nightly Shayan Hatch MD   25 Units at 04/20/22 2244    insulin lispro (HUMALOG) injection vial 0-6 Units  0-6 Units SubCUTAneous 2 times per day Shayan Hatch MD   3 Units at 04/20/22 2252    insulin lispro (HUMALOG) injection vial 0-12 Units  0-12 Units SubCUTAneous TID  RAFAEL Quick CNP   4 Units at 04/21/22 1751    enoxaparin (LOVENOX) injection 90 mg  1 mg/kg SubCUTAneous BID RAFAEL Kincaid - CNP   90 mg at 04/21/22 2156    losartan (COZAAR) tablet 50 mg  50 mg Oral Daily RAFAEL Benito - CNP   50 mg at 04/20/22 0818    0.9 % sodium chloride infusion   IntraVENous Continuous RAFAEL Quick  mL/hr at 04/21/22 0908 New Bag at 04/21/22 0908    aspirin EC tablet 81 mg  81 mg Oral Daily RFAAEL Quick CNP   81 mg at 04/20/22 0819    escitalopram (LEXAPRO) tablet 10 mg  10 mg Oral Daily RAFAEL Quick - CNP   10 mg at 04/20/22 0819    FLUoxetine (PROZAC) capsule 40 mg  40 mg Oral Daily RAFAEL Quick - CNP   40 mg at 04/20/22 0818    metoprolol succinate (TOPROL XL) extended release tablet 25 mg  25 mg Oral Daily RAFAEL Hope - CNP   25 mg at 04/20/22 0819    pantoprazole (PROTONIX) tablet 40 mg  40 mg Oral QAM AC RAFAEL Quick - CNP   40 mg at 04/20/22 1866    atorvastatin (LIPITOR) tablet 40 mg  40 mg Oral Nightly RAFAEL Quick - CNP   40 mg at 04/21/22 2156    sodium chloride flush 0.9 % injection 5-40 mL  5-40 mL IntraVENous 2 times per day RAFAEL Hope - CNP   10 mL at 04/21/22 1012    sodium chloride flush 0.9 % injection 5-40 mL  5-40 mL IntraVENous PRN RAFAEL Quick - CNP        0.9 % sodium chloride infusion   IntraVENous PRN RAFAEL Quick - CNP        polyethylene glycol (GLYCOLAX) packet 17 g  17 g Oral Daily PRN RAFAEL Quick CNP        acetaminophen (TYLENOL) tablet 650 mg  650 mg Oral Q6H PRN RAFAEL Hope - CNP   650 mg at 04/21/22 2200    Or    acetaminophen (TYLENOL) suppository 650 mg  650 mg Rectal Q6H PRN RAFAEL Quick CNP        ondansetron (ZOFRAN-ODT) disintegrating tablet 4 mg  4 mg Oral Q8H PRN RAFAEL Quick CNP        Or    ondansetron (ZOFRAN) injection 4 mg  4 mg IntraVENous Q6H PRN Liat I RAFAEL Dowell - CNP   4 mg at 04/20/22 1333    glucose chewable tablet 4 each  4 tablet Oral PRN Liat I RAFAEL Dowell - CNP        dextrose 50 % IV solution  12.5 g IntraVENous PRN Liat I RAFAEL Dowell CNP        glucagon (rDNA) injection 1 mg  1 mg IntraMUSCular PRN Liat I RAFAEL Dowell CNP        dextrose 5 % solution  100 mL/hr IntraVENous PRN RAFAEL Quick - CNP        sennosides-docusate sodium (SENOKOT-S) 8.6-50 MG tablet 1 tablet  1 tablet Oral BID Liat Dowell, RAFAEL - CNP   1 tablet at 04/21/22 2152    HYDROcodone-acetaminophen (NORCO) 5-325 MG per tablet 1 tablet  1 tablet Oral Q4H PRN RAFAEL Hope CNP   1 tablet at 04/21/22 1553    Or    HYDROcodone-acetaminophen (NORCO) 5-325 MG per tablet 2 tablet  2 tablet Oral Q4H PRN Nelson Murcia APRN - CNP   2 tablet at 04/20/22 1740    vancomycin (VANCOCIN) 1250 mg in dextrose 5 % 250 mL IVPB  1,250 mg IntraVENous Q12H Nelson Murcia APRN - CNP   Stopped at 04/22/22 2574    piperacillin-tazobactam (ZOSYN) 3,375 mg in dextrose 5 % 50 mL IVPB (mini-bag)  3,375 mg IntraVENous Q6H Liat Dowell APRN -  mL/hr at 04/22/22 0647 3,375 mg at 04/22/22 6989       Allergies:  No Known Allergies    Problem List:    Patient Active Problem List   Diagnosis Code    Unstable angina ([address] I20.0    Lumbar back pain with radiculopathy affecting left lower extremity M54.16    S/P lumbar laminectomy Z98.890    Type 2 diabetes mellitus with diabetic neuropathy, without long-term current use of insulin ([facility]) E11.40    Essential hypertension I10    Gastroesophageal reflux disease without esophagitis K21.9    WD-Cellulitis of foot right L03.119    Chest pain R07.9    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 ([address] V65.315K    WD-Diabetic ulcer of toe of left foot associated with type 2 diabetes mellitus, with necrosis of muscle ([facility]) E11.621, L97.523    WD-Diabetic ulcer of right midfoot associated with type 2 diabetes mellitus, with fat layer exposed ([address] E11.621, L97.412    Diabetic foot ([address] E11.8    Moderate nonproliferative diabetic retinopathy of both eyes without macular edema associated with type 2 diabetes mellitus ([address] G86.4325    Acute osteomyelitis ([facility]) M86.10    Acute osteomyelitis of foot ([address] M86.179       Past Medical History:        Diagnosis Date    Callus of foot     Right foot - see's Dr. Gerard Puckett Diabetic ulcer of left midfoot associated with type 2 diabetes mellitus, with necrosis of muscle ([address] 6/7/2021    Diabetic ulcer of right midfoot associated with type 2 diabetes mellitus, with fat layer exposed ([facility] 8/11/2020    Dizziness     positional    Essential hypertension     Follows with PCP    Hyperlipidemia     Lumbar radiculopathy     Septic embolism ([address] 6/13/2020       Past Surgical History:        Procedure Laterality Date    ACHILLES TENDON SURGERY Right 1/8/2021    RIGHT ACHILLES TENDON LENGTHENING REPAIR performed by Hernan Chaudhry DPM at [facility]  03/2018    [address] [place] [address] 51    DENTAL SURGERY      teeth extractions -half per patient    HERNIA REPAIR Bilateral 5/27/2020    BIALTERAL HERNIA INGUINAL REPAIR performed by Lester Carrion MD at [address] [place] [address]  2018    IA OFFICE/OUTPT VISIT,PROCEDURE ONLY Left 4/17/2018    L5-S1 HEMILAMINECTOMY, REMOVAL OF DISC LEFT SIDE performed by Mike Marquez MD at [address] Right 1/8/2021    RIGHT TRANSMETATARSAL TOE AMPUTATION performed by Hernan Chaudhry DPM at [address] EXTRACTION         Social History:    Social History     Tobacco Use    Smoking status: Never Smoker    Smokeless tobacco: Never Used   Substance Use Topics    Alcohol use: Yes     Comment: \"couple beers a night\"                                Counseling given: Not Answered      Vital Signs (Current):   Vitals:    04/22/22 0400 04/22/22 0416 04/22/22 0500 04/22/22 0600   BP:  (!) 156/93     Pulse: 70 71 69 66   Resp: 16 12 16 11   Temp:  36.7 °C (98 °F)     TempSrc:  Oral     SpO2:  95%     Weight:       Height:                                                  BP Readings from Last 3 Encounters:   04/22/22 (!) 156/93   01/24/22 (!) 144/92   11/04/21 (!) 164/93       NPO Status:                                                                                 BMI:   Wt Readings from Last 3 Encounters:   04/18/22 190 lb 14.7 oz (86.6 kg)   04/21/22 170 lb (77.1 kg)   01/24/22 188 lb (85.3 kg)     Body mass index is 25.89 kg/m².     CBC:   Lab Results   Component Value Date    WBC 9.5 04/20/2022    RBC 3.45 04/20/2022    HGB 9.6 04/21/2022    HCT 31.4 04/21/2022    MCV 91.9 04/20/2022    RDW 13.0 04/20/2022     04/20/2022       CMP:   Lab Results   Component Value Date     04/22/2022    K 4.2 04/22/2022    K 3.8 03/14/2018     04/22/2022    CO2 26 04/22/2022    BUN 5 04/22/2022    CREATININE 0.7 04/22/2022    GFRAA >60 04/22/2022    AGRATIO 1.4 03/12/2020    LABGLOM >60 04/22/2022    GLUCOSE 182 04/22/2022    PROT 7.1 04/19/2022    PROT 7.1 04/19/2022    CALCIUM 8.0 04/22/2022    BILITOT 0.4 04/19/2022    BILITOT 0.4 04/19/2022    ALKPHOS 78 04/19/2022    ALKPHOS 78 04/19/2022    AST 9 04/19/2022    AST 9 04/19/2022    ALT 6 04/19/2022    ALT 6 04/19/2022       POC Tests:   Recent Labs     04/22/22  0801   POCGLU 235*       Coags:   Lab Results   Component Value Date    PROTIME 14.7 04/19/2022    INR 1.14 04/19/2022    APTT 22.9 06/12/2021       HCG (If Applicable): No results found for: PREGTESTUR, PREGSERUM, HCG, HCGQUANT     ABGs: No results found for: PHART, PO2ART, LLK0PJR, IDM7KCI, BEART, H4PFVDCO     Type & Screen (If Applicable):  Lab Results   Component Value Date    LABRH POS 04/17/2018       Drug/Infectious Status (If Applicable):  No results found for: HIV, HEPCAB    COVID-19 Screening (If Applicable):   Lab Results   Component Value Date    COVID19 NOT DETECTED 06/12/2021    COVID19 NOT DETECTED 06/09/2021           Anesthesia Evaluation  Patient summary reviewed  Airway: Mallampati: II  TM distance: >3 FB     Mouth opening: > = 3 FB Dental:    (+) edentulous      Pulmonary:normal exam                               Cardiovascular:    (+) hypertension:, angina:,                ROS comment: 2021 [facility]  Procedure Type      Procedure Summary   normal coronaries and normal LVEF      Recommendations   Optimize medications      Signatures    2022 echo   Summary   Left ventricular systolic function is normal.   Ejection fraction is visually estimated at 55%. No significant valvular disease noted.    No evidence of any pericardial effusion     Neuro/Psych:   (+) neuromuscular disease:,             GI/Hepatic/Renal:   (+) GERD:,           Endo/Other:    (+) DiabetesType II DM, poorly controlled, , blood dyscrasia: anticoagulation therapy:., .                  ROS comment: left foot osteomyelitis. Abdominal:             Vascular:   + DVT, .        ROS comment: DVT left lower extremity acute          Doppler shows occlusive left peroneal deep venous thrombosis         Risk factors include prolonged immobility, infection. Seen on venous duplex LLE. Other Findings:           Anesthesia Plan      MAC     ASA 3     ( Pre Anesthesia Assessment complete. Chart reviewed on 4/22/2022)  Induction: intravenous. Anesthetic plan and risks discussed with patient. Plan discussed with CRNA.               Mackey Nyhan, APRN - CRNA   4/22/2022

## 2022-04-22 NOTE — PROGRESS NOTES
Patient very emotional today. Upset that mother has missed dialysis twice this week since he has been in hospital.  Patient was crying at one time today. Patient called me to room one time and said \"I want to go home, I can't do this, I'm done\". Nurse back to room a few minutes later and patient apologized and said he knows he has anibal stay and go through with surgery tomorrow.   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

## 2022-04-22 NOTE — PROGRESS NOTES
ATTENDING PHYSICIAN'S PROGRESS NOTES    Patient:  Annette Older      Unit/Bed:2025/2025-A    YOB: 1980    MRN: [id number]     Acct: [de-identified]     Admit date: 4/18/2022    Patient Seen, Chart, Consults notes, Labs, Radiology studies reviewed. SUBJECTIVE:   Day 4 of stay with left foot osteomyelitis. Seen and examined at bedside. No new complaints. General surgery did bedside I&D today and plans to take to the OR tomorrow . ID has seen and antibiotics changed. All other ROS negative except noted in HPI    Past, Family, Social History unchanged from admission. Diet:  ADULT DIET;  Regular; 3 carb choices (45 gm/meal)  Diet NPO    Medications:  Scheduled Meds:   cefepime  2,000 mg IntraVENous Q8H    metroNIDAZOLE  500 mg IntraVENous Q8H    insulin lispro  10 Units SubCUTAneous TID WC    insulin glargine  25 Units SubCUTAneous Nightly    insulin lispro  0-6 Units SubCUTAneous 2 times per day    insulin lispro  0-12 Units SubCUTAneous TID WC    enoxaparin  1 mg/kg SubCUTAneous BID    losartan  50 mg Oral Daily    aspirin  81 mg Oral Daily    escitalopram  10 mg Oral Daily    FLUoxetine  40 mg Oral Daily    metoprolol succinate  25 mg Oral Daily    pantoprazole  40 mg Oral QAM AC    atorvastatin  40 mg Oral Nightly    sodium chloride flush  5-40 mL IntraVENous 2 times per day    sennosides-docusate sodium  1 tablet Oral BID    vancomycin  1,250 mg IntraVENous Q12H     Continuous Infusions:   sodium chloride 150 mL/hr at 04/22/22 1459    sodium chloride      dextrose       PRN Meds:sodium chloride flush, sodium chloride, polyethylene glycol, acetaminophen **OR** acetaminophen, ondansetron **OR** ondansetron, glucose, dextrose, glucagon (rDNA), dextrose, HYDROcodone 5 mg - acetaminophen **OR** HYDROcodone 5 mg - acetaminophen    OBJECTIVE:  CBC:   Recent Labs     04/20/22  0850 04/21/22  1429   WBC 9.5  --    HGB 9.8* 9.6*   *  --      BMP:    Recent Labs 04/20/22  0024 04/21/22  1429 04/22/22  0412   NA  --  135 137   K  --  4.4 4.2   CL  --  99 102   CO2  --  28 26   BUN  --  5* 5*   CREATININE 0.6* 0.7* 0.7*   GLUCOSE  --  138* 182*     Calcium:  Recent Labs     04/22/22  0412   CALCIUM 8.0*     Ionized Calcium:No results for input(s): IONCA in the last 72 hours. Magnesium:No results for input(s): MG in the last 72 hours. Phosphorus:No results for input(s): PHOS in the last 72 hours. BNP:No results for input(s): BNP in the last 72 hours. Glucose:  Recent Labs     04/22/22  0801 04/22/22  1151 04/22/22  1751   POCGLU 235* 82 184*     HgbA1C:   No results for input(s): LABA1C in the last 72 hours. INR:   No results for input(s): INR in the last 72 hours. Hepatic:   No results for input(s): ALKPHOS, ALT, AST, PROT, BILITOT, BILIDIR, LABALBU in the last 72 hours. Amylase and Lipase:No results for input(s): LACTA, AMYLASE in the last 72 hours. Lactic Acid: No results for input(s): LACTA in the last 72 hours. Troponin: No results for input(s): CKTOTAL, CKMB, TROPONINT in the last 72 hours. BNP: No results for input(s): BNP in the last 72 hours. Lipids: No results for input(s): CHOL, TRIG, HDL, LDL, LDLCALC in the last 72 hours.   ABGs: No results found for: PH, PCO2, PO2, HCO3, O2SAT    Radiology reports as per the Radiologist  Radiology:   Echocardiogram complete 2D with doppler with color  Result Date: 4/19/2022  Transthoracic Echocardiography Report (TTE)  Demographics   Patient Name       Lenore Roll      Date of Study       04/19/2022   Date of Birth      1980         Gender              Male   Age                39 year(s)         Race                   Patient Number     [id number]         Room Number         2025   Visit Number       [id number]   Corporate ID       [id number]   Accession Number   [id number]          Bette Lara RVT   Ordering Physician Walter Herrera PA-C         Physician           MD  Procedure Type of Study   TTE procedure:ECHOCARDIOGRAM COMPLETE 2D W DOPPLER W COLOR. Procedure Date Date: 04/19/2022 Start: 10:32 AM Study Location: Portable Technical Quality: Adequate visualization Indications:Dyspnea/SOB. Patient Status: Routine Height: 72 inches Weight: 170 pounds BSA: 1.99 m2 BMI: 23.06 kg/m2 HR: 81 bpm  Conclusions   Summary  Left ventricular systolic function is normal.  Ejection fraction is visually estimated at 55%. No significant valvular disease noted. No evidence of any pericardial effusion. Signature   ------------------------------------------------------------------  Electronically signed by Rodrigo Wells MD (Interpreting  physician) on 04/19/2022 at 12:42 PM  ------------------------------------------------------------------   Findings   Left Ventricle  Left ventricular systolic function is normal.  Ejection fraction is visually estimated at 55%. Normal diastolic function. Left ventricle size is normal.  No regional wall motion abnormalities. Left Atrium  Mildly dilated left atrium. Right Atrium  Essentially normal right atrium. Right Ventricle  Essentially normal right ventricle. Aortic Valve  Structurally normal aortic valve. Mitral Valve  Structurally normal mitral valve. Tricuspid Valve  Structurally normal tricuspid valve. Pulmonic Valve  The pulmonic valve was not well visualized. Pericardial Effusion  No evidence of any pericardial effusion. Pleural Effusion  No evidence of pleural effusion. Miscellaneous  Inferior vena cava is dilated, measuring at 2.2 cm, but does collapse with  respiration. Aorta was not clearly visualized.   M-Mode/2D Measurements & Calculations       XR FOOT LEFT (MIN 3 VIEWS)  Result Date: 4/18/2022  EXAMINATION: THREE XRAY VIEWS OF THE LEFT FOOT 4/18/2022 12:04 pm COMPARISON: 06/12/2021 HISTORY: ORDERING SYSTEM PROVIDED HISTORY: left foot pain TECHNOLOGIST PROVIDED HISTORY: Reason for exam:->left foot pain Reason for Exam: wound infection FINDINGS: Extensive bony destruction has developed throughout the proximal phalanx 1st digit, 1st metatarsal head and base of the distal phalanx 1st digit. There is diffuse osteopenia and osteolysis in the 1st metatarsal extending into the base with superimposed pathologic fracture. Chronic appearing periosteal reaction is present in the 2nd metatarsal. Areas of bony fragmentation are noted in the distal phalanx 2nd digit concerning for acute osteomyelitis. Subtle areas of suspected osteolysis are noted in the 2nd metatarsal head. Remote postoperative changes from 3rd metatarsal amputation. The 4th and 5th digits are intact. Midfoot and hindfoot bones are intact. Interval fragmentation and osteopenia throughout the 1st metatarsal.  Absence or bony destruction of the proximal phalanx 1st digit with diffuse soft tissue swelling. Additional bony destruction in the proximal phalanx. Overall findings concerning for acute osteomyelitis. Correlate for any interval surgery. Bony destruction of the distal tuft, distal phalanx 2nd digit concerning for acute osteomyelitis. Questionable osteomyelitis in the 2nd metatarsal head. XR FOOT RIGHT (MIN 3 VIEWS)  Result Date: 4/18/2022  EXAMINATION: THREE XRAY VIEWS OF THE RIGHT FOOT 4/18/2022 12:27 pm COMPARISON: 01/04/2021 HISTORY: ORDERING SYSTEM PROVIDED HISTORY: ulcer right foot TECHNOLOGIST PROVIDED HISTORY: Reason for exam:->ulcer right foot Reason for Exam: ulcer right foot FINDINGS: Status post transmetatarsal amputation across the forefoot. No radiopaque foreign body in the soft tissues. No acute fracture. No acute periostitis or bony destruction. Linear ulcer adjacent to the 1st metatarsal remnant is identified. Chest No acute periostitis or bony destruction. Linear ulcer adjacent to the 1st metatarsal remnant.      VL DUP LOWER EXTREMITY VENOUS LEFT  Addendum Date: 4/18/2022    ADDENDUM: Critical results were called by Dr. Raquel Ren to [facility] AT [facility] on 4/18/2022 at 14:11. Result Date: 4/18/2022  EXAMINATION: DUPLEX VENOUS ULTRASOUND OF THE LEFT LOWER EXTREMITY 4/18/2022 1:22 pm TECHNIQUE: Duplex ultrasound using B-mode/gray scaled imaging and Doppler spectral analysis and color flow was obtained of the deep venous structures of the left extremity. COMPARISON: 06/03/2018 HISTORY: ORDERING SYSTEM PROVIDED HISTORY: ro dvt, leg pain TECHNOLOGIST PROVIDED HISTORY: Reason for exam:->ro dvt, leg pain Reason for Exam: wound to left foot, pain to lower leg, swelling from knee down x 3 mos FINDINGS: Occlusive thrombus in 1 of the peroneal veins. The visualized veins of the left lower extremity are otherwise patent and free of echogenic thrombus. The veins demonstrate good compressibility with normal color flow study and spectral analysis. Similar inguinal lymphadenopathy since at least 2021.   1. Occlusive thrombus in 1 of the peroneal veins. 2. Similar inguinal lymphadenopathy since at least 2021. MRI left foot  Acute osteomyelitis involving the entire 1st metatarsal as well as the 1st   digit phalanges, with associated osseous erosions and pathologic fractures.    There is a soft tissue abscess centered at the level of the 1st metatarsal   head measuring approximately 4.5 x 3.4 x 3.7 cm.  Smaller suspected abscess   is seen at the level of the 1st IP joint.       Bone marrow edema is seen in the 2nd-4th metatarsals, all the cuneiform, and   2nd proximal and distal phalanges.  There are associated areas of T1 marrow   replacement.  Acute osteomyelitis should be suspected.       Cellulitis.       Prior amputation of the 3rd digit phalanges and a portion of the 3rd distal   metatarsal.       Physical Exam:  Vitals: BP (!) 153/94   Pulse 74   Temp 98.1 °F (36.7 °C) (Oral)   Resp 15   Ht 6' 0.01\" (1.829 m)   Wt 190 lb 14.7 oz (86.6 kg)   SpO2 98%   BMI 25.89 kg/m²   24 hour intake/output:    Intake/Output Summary (Last 24 hours) at 4/22/2022 1824  Last data filed at 4/22/2022 0156  Gross per 24 hour   Intake --   Output 1575 ml   Net -1575 ml     Last 3 weights: Wt Readings from Last 3 Encounters:   04/18/22 190 lb 14.7 oz (86.6 kg)   04/21/22 170 lb (77.1 kg)   01/24/22 188 lb (85.3 kg)     General appearance - alert, well appearing, and in no distress  HEENT: Normocephalic, Atraumatic, Conjuctiva pink, PERRL, Oral mucosa normal, Lips, teeth and gums normal, Trachea midline, Thyroid normal and No noted lymphadenopathy  Chest - clear to auscultation, no wheezes, rales or rhonchi, symmetric air entry  Cardiovascular - normal rate, regular rhythm, normal S1, S2, no murmurs, rubs, clicks or gallops  Abdomen - soft, nontender, nondistended, no masses or organomegaly   Neurological - Alert and oriented, Normal speech, No focal findings or movement disorder noted and Motor and sensory grossly normal bilaterally  Integumentary - Skin color, texture, turgor normal. No Rashes or lesions  Musculoskeletal -slight left ankle edema. Dressing left foot., Full ROM times 4 extremities and No clubbing or cyanosis  S/ post transmetatarsal amputation on the right foot. DVT prophylaxis: [x] Lovenox                                 [] SCDs                                 [] SQ Heparin                                 [] Encourage ambulation           [] Already on Anticoagulation                 ASSESSMENT / PLAN :    Principal Problem:    Acute osteomyelitis ([address]  Active Problems:    Abscess of left foot    Cellulitis of left foot    Subacute osteomyelitis of left foot ([address]  Resolved Problems:    * No resolved hospital problems.  *  Darian Salazar is a 39 y.o. male with a pmh of osteomyelitis, hypertension, uncontrolled type 2 diabetes who presents with osteomyelitis of the left lower extremity x 3 months        # Sepsis secondary to diabetic foot ulcer  #Diabetic foot ulcer   #Acute Osteomylitis of Left foot   # Open wound of left  toe with complication   -X-ray ankle left foot with changes concerning for osteomyelitis in the 1st and 2nd digit. -MRI left foot shows acute osteomyelitis involving entire fifth metatarsal with callus first digit phalanges with associated osseous erosion and pathology fractures, 4.5 x 3.4x3.7 at the level of the first metatarsal head, smaller suspected abscess at the level of the first IP joint  - Met sepsis criteria based on leukocytosis, tachycardia, tachypnea, active source of infection  -ID has seen patient and recommends to DC Zosyn , start cefepime and Flagyl and continue Vanco. To Follow with Dr. Madeline Corral, pending left hallux amputation 4/23, will await surgical cultures and histopathology to evaluate margins for residual OM to determine duration of therapy. # DVT left lower extremity acute          Doppler shows occlusive left peroneal deep venous thrombosis         Risk factors include prolonged immobility, infection. Seen on venous duplex LLE         Continue lovenox 1 mg/kg BID and plan to change to NOAC after surgery. Uncontrolled Insulin dependent type 2 diabetes  Type 2 Diabetes with complication             Continue current insulin regimen             Endocrine following. Blood sugars better. Holding home oral antihyperglycemics. HgA1c 10.4 showing baseline poor control     Hyponatremia  Resolved  Renal function              Essential hypertension       Continue home medications       Stable    Hyperlipidemia   On lipitor            DVT prophylaxis: Lovenox    CODE STATUS: Full code    Surrogate decision maker:  Mother.       Electronically signed by Ros Flowers MD on 4/22/2022 at 6:24 PM    Rounding Hospitalist

## 2022-04-23 ENCOUNTER — ANESTHESIA (OUTPATIENT)
Dept: OPERATING ROOM | Age: 42
DRG: 710 | End: 2022-04-23
Payer: COMMERCIAL

## 2022-04-23 VITALS
DIASTOLIC BLOOD PRESSURE: 66 MMHG | SYSTOLIC BLOOD PRESSURE: 105 MMHG | RESPIRATION RATE: 15 BRPM | OXYGEN SATURATION: 98 %

## 2022-04-23 LAB
CULTURE: NORMAL
CULTURE: NORMAL
ERYTHROCYTE SEDIMENTATION RATE: 81 MM/HR (ref 0–15)
GLUCOSE BLD-MCNC: 156 MG/DL (ref 70–99)
GLUCOSE BLD-MCNC: 157 MG/DL (ref 70–99)
GLUCOSE BLD-MCNC: 169 MG/DL (ref 70–99)
GLUCOSE BLD-MCNC: 231 MG/DL (ref 70–99)
GLUCOSE BLD-MCNC: 76 MG/DL (ref 70–99)
Lab: NORMAL
Lab: NORMAL
SPECIMEN: NORMAL
SPECIMEN: NORMAL

## 2022-04-23 PROCEDURE — 3600000002 HC SURGERY LEVEL 2 BASE: Performed by: SURGERY

## 2022-04-23 PROCEDURE — 85652 RBC SED RATE AUTOMATED: CPT

## 2022-04-23 PROCEDURE — 3700000000 HC ANESTHESIA ATTENDED CARE: Performed by: SURGERY

## 2022-04-23 PROCEDURE — 6370000000 HC RX 637 (ALT 250 FOR IP): Performed by: SURGERY

## 2022-04-23 PROCEDURE — 36415 COLL VENOUS BLD VENIPUNCTURE: CPT

## 2022-04-23 PROCEDURE — 99232 SBSQ HOSP IP/OBS MODERATE 35: CPT | Performed by: INTERNAL MEDICINE

## 2022-04-23 PROCEDURE — 2580000003 HC RX 258: Performed by: SURGERY

## 2022-04-23 PROCEDURE — 3700000001 HC ADD 15 MINUTES (ANESTHESIA): Performed by: SURGERY

## 2022-04-23 PROCEDURE — 2500000003 HC RX 250 WO HCPCS: Performed by: NURSE PRACTITIONER

## 2022-04-23 PROCEDURE — 2780000010 HC IMPLANT OTHER: Performed by: SURGERY

## 2022-04-23 PROCEDURE — 6360000002 HC RX W HCPCS

## 2022-04-23 PROCEDURE — 87186 SC STD MICRODIL/AGAR DIL: CPT

## 2022-04-23 PROCEDURE — 2580000003 HC RX 258: Performed by: NURSE PRACTITIONER

## 2022-04-23 PROCEDURE — 94761 N-INVAS EAR/PLS OXIMETRY MLT: CPT

## 2022-04-23 PROCEDURE — 88305 TISSUE EXAM BY PATHOLOGIST: CPT

## 2022-04-23 PROCEDURE — 2580000003 HC RX 258

## 2022-04-23 PROCEDURE — 2720000010 HC SURG SUPPLY STERILE: Performed by: SURGERY

## 2022-04-23 PROCEDURE — 2060000000 HC ICU INTERMEDIATE R&B

## 2022-04-23 PROCEDURE — 88311 DECALCIFY TISSUE: CPT

## 2022-04-23 PROCEDURE — 6360000002 HC RX W HCPCS: Performed by: NURSE PRACTITIONER

## 2022-04-23 PROCEDURE — 6360000002 HC RX W HCPCS: Performed by: SURGERY

## 2022-04-23 PROCEDURE — 82962 GLUCOSE BLOOD TEST: CPT

## 2022-04-23 PROCEDURE — 87077 CULTURE AEROBIC IDENTIFY: CPT

## 2022-04-23 PROCEDURE — 88304 TISSUE EXAM BY PATHOLOGIST: CPT

## 2022-04-23 PROCEDURE — 87070 CULTURE OTHR SPECIMN AEROBIC: CPT

## 2022-04-23 PROCEDURE — 3600000012 HC SURGERY LEVEL 2 ADDTL 15MIN: Performed by: SURGERY

## 2022-04-23 PROCEDURE — 2709999900 HC NON-CHARGEABLE SUPPLY: Performed by: SURGERY

## 2022-04-23 PROCEDURE — 87205 SMEAR GRAM STAIN: CPT

## 2022-04-23 PROCEDURE — 6370000000 HC RX 637 (ALT 250 FOR IP)

## 2022-04-23 PROCEDURE — 6370000000 HC RX 637 (ALT 250 FOR IP): Performed by: NURSE PRACTITIONER

## 2022-04-23 PROCEDURE — 0Y9N0ZZ DRAINAGE OF LEFT FOOT, OPEN APPROACH: ICD-10-PCS | Performed by: SURGERY

## 2022-04-23 PROCEDURE — 0Y6N0Z9 DETACHMENT AT LEFT FOOT, PARTIAL 1ST RAY, OPEN APPROACH: ICD-10-PCS | Performed by: SURGERY

## 2022-04-23 PROCEDURE — 87075 CULTR BACTERIA EXCEPT BLOOD: CPT

## 2022-04-23 PROCEDURE — 2500000003 HC RX 250 WO HCPCS: Performed by: SURGERY

## 2022-04-23 RX ORDER — PROPOFOL 10 MG/ML
INJECTION, EMULSION INTRAVENOUS CONTINUOUS PRN
Status: DISCONTINUED | OUTPATIENT
Start: 2022-04-23 | End: 2022-04-23 | Stop reason: SDUPTHER

## 2022-04-23 RX ORDER — HYDROCODONE BITARTRATE AND ACETAMINOPHEN 5; 325 MG/1; MG/1
1 TABLET ORAL EVERY 4 HOURS PRN
Status: DISCONTINUED | OUTPATIENT
Start: 2022-04-23 | End: 2022-04-23 | Stop reason: SDUPTHER

## 2022-04-23 RX ORDER — LIDOCAINE HYDROCHLORIDE 10 MG/ML
INJECTION, SOLUTION EPIDURAL; INFILTRATION; INTRACAUDAL; PERINEURAL
Status: COMPLETED | OUTPATIENT
Start: 2022-04-23 | End: 2022-04-23

## 2022-04-23 RX ORDER — SODIUM CHLORIDE, SODIUM LACTATE, POTASSIUM CHLORIDE, CALCIUM CHLORIDE 600; 310; 30; 20 MG/100ML; MG/100ML; MG/100ML; MG/100ML
INJECTION, SOLUTION INTRAVENOUS CONTINUOUS PRN
Status: DISCONTINUED | OUTPATIENT
Start: 2022-04-23 | End: 2022-04-23 | Stop reason: SDUPTHER

## 2022-04-23 RX ORDER — PROPOFOL 10 MG/ML
INJECTION, EMULSION INTRAVENOUS PRN
Status: DISCONTINUED | OUTPATIENT
Start: 2022-04-23 | End: 2022-04-23 | Stop reason: SDUPTHER

## 2022-04-23 RX ORDER — LIDOCAINE HYDROCHLORIDE 20 MG/ML
INJECTION, SOLUTION INTRAVENOUS PRN
Status: DISCONTINUED | OUTPATIENT
Start: 2022-04-23 | End: 2022-04-23 | Stop reason: SDUPTHER

## 2022-04-23 RX ORDER — LORAZEPAM 0.5 MG/1
0.5 TABLET ORAL EVERY 4 HOURS PRN
Status: DISCONTINUED | OUTPATIENT
Start: 2022-04-23 | End: 2022-04-27 | Stop reason: HOSPADM

## 2022-04-23 RX ADMIN — INSULIN LISPRO 2 UNITS: 100 INJECTION, SOLUTION INTRAVENOUS; SUBCUTANEOUS at 18:01

## 2022-04-23 RX ADMIN — HYDROCODONE BITARTRATE AND ACETAMINOPHEN 2 TABLET: 5; 325 TABLET ORAL at 14:54

## 2022-04-23 RX ADMIN — PANTOPRAZOLE SODIUM 40 MG: 40 TABLET, DELAYED RELEASE ORAL at 10:12

## 2022-04-23 RX ADMIN — FLUOXETINE 40 MG: 10 CAPSULE ORAL at 09:58

## 2022-04-23 RX ADMIN — SODIUM CHLORIDE, PRESERVATIVE FREE 10 ML: 5 INJECTION INTRAVENOUS at 20:09

## 2022-04-23 RX ADMIN — ENOXAPARIN SODIUM 90 MG: 100 INJECTION SUBCUTANEOUS at 20:35

## 2022-04-23 RX ADMIN — METOPROLOL SUCCINATE 25 MG: 25 TABLET, EXTENDED RELEASE ORAL at 09:58

## 2022-04-23 RX ADMIN — HYDROCODONE BITARTRATE AND ACETAMINOPHEN 2 TABLET: 5; 325 TABLET ORAL at 09:05

## 2022-04-23 RX ADMIN — SODIUM CHLORIDE: 9 INJECTION, SOLUTION INTRAVENOUS at 23:41

## 2022-04-23 RX ADMIN — INSULIN LISPRO 10 UNITS: 100 INJECTION, SOLUTION INTRAVENOUS; SUBCUTANEOUS at 12:45

## 2022-04-23 RX ADMIN — SODIUM CHLORIDE: 9 INJECTION, SOLUTION INTRAVENOUS at 15:59

## 2022-04-23 RX ADMIN — PROPOFOL 90 MCG/KG/MIN: 10 INJECTION, EMULSION INTRAVENOUS at 07:53

## 2022-04-23 RX ADMIN — METRONIDAZOLE 500 MG: 500 INJECTION, SOLUTION INTRAVENOUS at 10:04

## 2022-04-23 RX ADMIN — SODIUM CHLORIDE: 9 INJECTION, SOLUTION INTRAVENOUS at 09:09

## 2022-04-23 RX ADMIN — INSULIN LISPRO 10 UNITS: 100 INJECTION, SOLUTION INTRAVENOUS; SUBCUTANEOUS at 18:02

## 2022-04-23 RX ADMIN — VANCOMYCIN HYDROCHLORIDE 1250 MG: 10 INJECTION, POWDER, LYOPHILIZED, FOR SOLUTION INTRAVENOUS at 02:16

## 2022-04-23 RX ADMIN — INSULIN LISPRO 4 UNITS: 100 INJECTION, SOLUTION INTRAVENOUS; SUBCUTANEOUS at 12:45

## 2022-04-23 RX ADMIN — LOSARTAN POTASSIUM 50 MG: 25 TABLET, FILM COATED ORAL at 09:58

## 2022-04-23 RX ADMIN — ESCITALOPRAM OXALATE 10 MG: 10 TABLET ORAL at 09:58

## 2022-04-23 RX ADMIN — VANCOMYCIN HYDROCHLORIDE 1250 MG: 10 INJECTION, POWDER, LYOPHILIZED, FOR SOLUTION INTRAVENOUS at 14:06

## 2022-04-23 RX ADMIN — CEFEPIME HYDROCHLORIDE 2000 MG: 2 INJECTION, POWDER, FOR SOLUTION INTRAVENOUS at 16:03

## 2022-04-23 RX ADMIN — ASPIRIN 81 MG: 81 TABLET, COATED ORAL at 09:58

## 2022-04-23 RX ADMIN — SODIUM CHLORIDE, POTASSIUM CHLORIDE, SODIUM LACTATE AND CALCIUM CHLORIDE: 600; 310; 30; 20 INJECTION, SOLUTION INTRAVENOUS at 07:47

## 2022-04-23 RX ADMIN — CEFEPIME HYDROCHLORIDE 2000 MG: 2 INJECTION, POWDER, FOR SOLUTION INTRAVENOUS at 01:10

## 2022-04-23 RX ADMIN — CEFEPIME HYDROCHLORIDE 2000 MG: 2 INJECTION, POWDER, FOR SOLUTION INTRAVENOUS at 10:09

## 2022-04-23 RX ADMIN — METRONIDAZOLE 500 MG: 500 INJECTION, SOLUTION INTRAVENOUS at 16:02

## 2022-04-23 RX ADMIN — PROPOFOL 20 MG: 10 INJECTION, EMULSION INTRAVENOUS at 08:07

## 2022-04-23 RX ADMIN — ONDANSETRON 4 MG: 2 INJECTION INTRAMUSCULAR; INTRAVENOUS at 20:08

## 2022-04-23 RX ADMIN — PROPOFOL 50 MG: 10 INJECTION, EMULSION INTRAVENOUS at 07:53

## 2022-04-23 RX ADMIN — ATORVASTATIN CALCIUM 40 MG: 40 TABLET, FILM COATED ORAL at 20:08

## 2022-04-23 RX ADMIN — ACETAMINOPHEN 650 MG: 325 TABLET ORAL at 23:57

## 2022-04-23 RX ADMIN — LIDOCAINE HYDROCHLORIDE 100 MG: 20 INJECTION, SOLUTION INTRAVENOUS at 07:53

## 2022-04-23 ASSESSMENT — PULMONARY FUNCTION TESTS
PIF_VALUE: 1
PIF_VALUE: 2

## 2022-04-23 ASSESSMENT — PAIN SCALES - GENERAL
PAINLEVEL_OUTOF10: 6
PAINLEVEL_OUTOF10: 0
PAINLEVEL_OUTOF10: 8
PAINLEVEL_OUTOF10: 8
PAINLEVEL_OUTOF10: 7
PAINLEVEL_OUTOF10: 0
PAINLEVEL_OUTOF10: 5

## 2022-04-23 ASSESSMENT — PAIN - FUNCTIONAL ASSESSMENT
PAIN_FUNCTIONAL_ASSESSMENT: PREVENTS OR INTERFERES SOME ACTIVE ACTIVITIES AND ADLS

## 2022-04-23 ASSESSMENT — PAIN DESCRIPTION - LOCATION
LOCATION: HEAD
LOCATION: FOOT
LOCATION: HEAD
LOCATION: FOOT

## 2022-04-23 ASSESSMENT — PAIN DESCRIPTION - DESCRIPTORS
DESCRIPTORS: ACHING
DESCRIPTORS: ACHING
DESCRIPTORS: ACHING;DISCOMFORT
DESCRIPTORS: ACHING;SHARP;SHOOTING;DISCOMFORT
DESCRIPTORS: ACHING;SORE

## 2022-04-23 ASSESSMENT — PAIN DESCRIPTION - ORIENTATION
ORIENTATION: RIGHT;LEFT
ORIENTATION: RIGHT;LEFT
ORIENTATION: MID
ORIENTATION: ANTERIOR
ORIENTATION: LEFT;RIGHT

## 2022-04-23 ASSESSMENT — PAIN DESCRIPTION - PAIN TYPE
TYPE: ACUTE PAIN
TYPE: ACUTE PAIN

## 2022-04-23 ASSESSMENT — PAIN SCALES - WONG BAKER
WONGBAKER_NUMERICALRESPONSE: 0
WONGBAKER_NUMERICALRESPONSE: 0

## 2022-04-23 ASSESSMENT — PAIN DESCRIPTION - ONSET
ONSET: ON-GOING
ONSET: PROGRESSIVE

## 2022-04-23 ASSESSMENT — PAIN DESCRIPTION - PROGRESSION: CLINICAL_PROGRESSION: NOT CHANGED

## 2022-04-23 ASSESSMENT — PAIN DESCRIPTION - FREQUENCY
FREQUENCY: INTERMITTENT
FREQUENCY: INTERMITTENT

## 2022-04-23 NOTE — PROGRESS NOTES
Progress Note( Dr. Nick Gowers)  4/23/2022  Subjective:   Admit Date: 4/18/2022  PCP: Florencio Aase, MD        Admitted For :Infection of the left foot possible osteomyelitis    Consulted For: Better control of blood glucose    Interval History: Feels lot better. Patient had amputation of left big toe and part of second left second toe on 4/23/2022  As expected complaining of some pain in the operative site    Denies any chest pains,   Denies SOB . Denies nausea or vomiting. No new bowel or bladder symptoms. Intake/Output Summary (Last 24 hours) at 4/23/2022 1257  Last data filed at 4/23/2022 0111  Gross per 24 hour   Intake --   Output 1000 ml   Net -1000 ml       DATA    CBC:   Recent Labs     04/21/22  1429   HGB 9.6*    CMP:  Recent Labs     04/21/22  1429 04/22/22  0412    137   K 4.4 4.2   CL 99 102   CO2 28 26   BUN 5* 5*   CREATININE 0.7* 0.7*   CALCIUM 8.2* 8.0*     Lipids:   Lab Results   Component Value Date    CHOL 108 03/13/2021    CHOL 142 03/12/2020    HDL 27 03/13/2021    TRIG 178 03/13/2021     Glucose:  Recent Labs     04/23/22  0217 04/23/22  0727 04/23/22  1221   POCGLU 156* 169* 231*     [id number]:  Lab Results   Component Value Date    LABA1C 10.4 04/18/2022     High Sensitivity TSH:   Lab Results   Component Value Date    TSHHS 1.770 03/12/2021     Free T3: No results found for: FT3  Free T4:No results found for: T4FREE    MRI FOOT LEFT W WO CONTRAST   Preliminary Result   Acute osteomyelitis involving the entire 1st metatarsal as well as the 1st   digit phalanges, with associated osseous erosions and pathologic fractures. There is a soft tissue abscess centered at the level of the 1st metatarsal   head measuring approximately 4.5 x 3.4 x 3.7 cm. Smaller suspected abscess   is seen at the level of the 1st IP joint. Bone marrow edema is seen in the 2nd-4th metatarsals, all the cuneiform, and   2nd proximal and distal phalanges.   There are associated areas of T1 marrow   replacement. Acute osteomyelitis should be suspected. Cellulitis. Prior amputation of the 3rd digit phalanges and a portion of the 3rd distal   metatarsal.         VL DUP LOWER EXTREMITY VENOUS LEFT   Final Result   Addendum 1 of 1   ADDENDUM:   Critical results were called by Dr. Amie Jolly to [facility] AT [facility] on    4/18/2022   at 14:11. Final   1. Occlusive thrombus in 1 of the peroneal veins. 2. Similar inguinal lymphadenopathy since at least 2021. XR FOOT RIGHT (MIN 3 VIEWS)   Final Result   Chest No acute periostitis or bony destruction. Linear ulcer adjacent to the   1st metatarsal remnant. XR FOOT LEFT (MIN 3 VIEWS)   Final Result   Interval fragmentation and osteopenia throughout the 1st metatarsal.  Absence   or bony destruction of the proximal phalanx 1st digit with diffuse soft   tissue swelling. Additional bony destruction in the proximal phalanx. Overall findings concerning for acute osteomyelitis. Correlate for any   interval surgery. Bony destruction of the distal tuft, distal phalanx 2nd digit concerning for   acute osteomyelitis. Questionable osteomyelitis in the 2nd metatarsal head.               Scheduled Medicines   Medications:    cefepime  2,000 mg IntraVENous Q8H    metroNIDAZOLE  500 mg IntraVENous Q8H    insulin lispro  10 Units SubCUTAneous TID WC    insulin glargine  25 Units SubCUTAneous Nightly    insulin lispro  0-6 Units SubCUTAneous 2 times per day    insulin lispro  0-12 Units SubCUTAneous TID WC    enoxaparin  1 mg/kg SubCUTAneous BID    losartan  50 mg Oral Daily    aspirin  81 mg Oral Daily    escitalopram  10 mg Oral Daily    FLUoxetine  40 mg Oral Daily    metoprolol succinate  25 mg Oral Daily    pantoprazole  40 mg Oral QAM AC    atorvastatin  40 mg Oral Nightly    sodium chloride flush  5-40 mL IntraVENous 2 times per day    sennosides-docusate sodium  1 tablet Oral BID    vancomycin  1,250 mg IntraVENous Q12H      Infusions:    sodium chloride 150 mL/hr at 04/23/22 0909    sodium chloride      dextrose           Objective:   Vitals: BP (!) 160/84   Pulse 71   Temp 98.1 °F (36.7 °C) (Oral)   Resp 16   Ht 6' 0.01\" (1.829 m)   Wt 190 lb 14.7 oz (86.6 kg)   SpO2 97%   BMI 25.89 kg/m²   General appearance: alert and cooperative with exam  Neck: no JVD or bruit  Thyroid : Normal lobes   Lungs: Has Vesicular Breath sounds   Heart:  regular rate and rhythm  Abdomen: soft, non-tender; bowel sounds normal; no masses,  no organomegaly  Musculoskeletal: Normal  Extremities: extremities normal, , no edema right foot partial amputation//Patient had amputation of left big toe and part of second left second toe on 4/23/2022  Neurologic:  Awake, alert, oriented to name, place and time. Cranial nerves II-XII are grossly intact. Motor is  intact. Sensory neuropathy. ,  and gait is abnormal.  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 ([facility])     WD-Diabetic ulcer of toe of left foot associated with type 2 diabetes mellitus, with necrosis of muscle ([facility])     WD-Diabetic ulcer of right midfoot associated with type 2 diabetes mellitus, with fat layer exposed ([address]     Diabetic foot ([facility])     Moderate nonproliferative diabetic retinopathy of both eyes without macular edema associated with type 2 diabetes mellitus ([address]     Acute osteomyelitis ([address]     Acute osteomyelitis of foot ([address]     Abscess of left foot     Cellulitis of left foot     Subacute osteomyelitis of left foot ([address]     Patient had

## 2022-04-23 NOTE — PROGRESS NOTES
V2.0  [facility] [place]      Name:  Riri Ferrera /Age/Sex: 1980  (39 y.o. male)   MRN & CSN:  [id number] & [id number] Encounter Date/Time: 2022 2:03 PM EDT    Location:  -A PCP: Phillip Beck MD       Hospital Day: 6    Assessment and Plan:   Riri Ferrera is a 39 y.o. male with pmh of osteomyelitis, hypertension who presents with Acute osteomyelitis ([address] ongoing for 3 months      Plan:  Sepsis secondary to osteomyelitis of the left lower extremity:  Patient presented with left lower extremity open wound x-ray showed bony destruction of the distal tuft had a leukocytosis tachycardia and tachypnea the patient was given vancomycin and Zosyn surgery was consulted the patient is status post amputation today he is postop day 0  Patient is on cefepime/vancomycin/Flagyl    2: Left peroneal DVT : Patient was started on Lovenox, cardiology was consulted    3: Diabetes type 2: Appreciate endocrinology input    4: Hypertension continue patient regimen titrate as needed    5:  Anxiety: Ativan as needed    Diet ADULT DIET; Regular; 4 carb choices (60 gm/meal)   DVT Prophylaxis [] Lovenox, []  Heparin, [] SCDs, [] Ambulation,  [] Eliquis, [] Xarelto  [] Coumadin   Code Status Full Code   Disposition From:   Expected Disposition:   Estimated Date of Discharge:   Patient requires continued admission due to IV antibiotics wound care   Surrogate Decision Maker/ POA      Subjective:     Chief Complaint: Wound Infection (left and right foot, swelling to left leg)       Riri Ferrera is a 39 y.o. male who presents with sepsis secondary to osteomyelitis of the left lower extremity status post amputation         Review of Systems:    Review of Systems        Objective:        Intake/Output Summary (Last 24 hours) at 2022 1403  Last data filed at 2022 0857  Gross per 24 hour   Intake 120 ml   Output 1400 ml   Net -1280 ml        Vitals:   Vitals:    22 1200   BP: (!) 142/84 Pulse: 76   Resp: 9   Temp: 98.1 °F (36.7 °C)   SpO2: 98%       Physical Exam:     General: NAD  Eyes: EOMI  ENT: neck supple  Cardiovascular: Regular rate. Respiratory: Clear to auscultation  Gastrointestinal: Soft, non tender  Genitourinary: no suprapubic tenderness  Musculoskeletal: No edema  Skin: warm, dry left foot wrapped  Neuro: Alert. Psych: Mood appropriate.      Medications:   Medications:    cefepime  2,000 mg IntraVENous Q8H    metroNIDAZOLE  500 mg IntraVENous Q8H    insulin lispro  10 Units SubCUTAneous TID WC    insulin glargine  25 Units SubCUTAneous Nightly    insulin lispro  0-6 Units SubCUTAneous 2 times per day    insulin lispro  0-12 Units SubCUTAneous TID WC    enoxaparin  1 mg/kg SubCUTAneous BID    losartan  50 mg Oral Daily    aspirin  81 mg Oral Daily    escitalopram  10 mg Oral Daily    FLUoxetine  40 mg Oral Daily    metoprolol succinate  25 mg Oral Daily    pantoprazole  40 mg Oral QAM AC    atorvastatin  40 mg Oral Nightly    sodium chloride flush  5-40 mL IntraVENous 2 times per day    sennosides-docusate sodium  1 tablet Oral BID    vancomycin  1,250 mg IntraVENous Q12H      Infusions:    sodium chloride 150 mL/hr at 04/23/22 0909    sodium chloride      dextrose       PRN Meds: LORazepam, 0.5 mg, Q4H PRN  sodium chloride flush, 5-40 mL, PRN  sodium chloride, , PRN  polyethylene glycol, 17 g, Daily PRN  acetaminophen, 650 mg, Q6H PRN   Or  acetaminophen, 650 mg, Q6H PRN  ondansetron, 4 mg, Q8H PRN   Or  ondansetron, 4 mg, Q6H PRN  glucose, 4 tablet, PRN  dextrose, 12.5 g, PRN  glucagon (rDNA), 1 mg, PRN  dextrose, 100 mL/hr, PRN  HYDROcodone 5 mg - acetaminophen, 1 tablet, Q4H PRN   Or  HYDROcodone 5 mg - acetaminophen, 2 tablet, Q4H PRN        Labs      Recent Results (from the past 24 hour(s))   POCT Glucose    Collection Time: 04/22/22  5:51 PM   Result Value Ref Range    POC Glucose 184 (H) 70 - 99 MG/DL   POCT Glucose    Collection Time: 04/22/22  8:56 PM Result Value Ref Range    POC Glucose 171 (H) 70 - 99 MG/DL   POCT Glucose    Collection Time: 04/23/22  2:17 AM   Result Value Ref Range    POC Glucose 156 (H) 70 - 99 MG/DL   POCT Glucose    Collection Time: 04/23/22  7:27 AM   Result Value Ref Range    POC Glucose 169 (H) 70 - 99 MG/DL   POCT Glucose    Collection Time: 04/23/22 12:21 PM   Result Value Ref Range    POC Glucose 231 (H) 70 - 99 MG/DL   Sedimentation Rate    Collection Time: 04/23/22 12:49 PM   Result Value Ref Range    Sed Rate 81 (H) 0 - 15 MM/HR        Imaging/Diagnostics Last 24 Hours   No results found.     Electronically signed by Stella Garsia MD on 4/23/2022 at 2:03 PM

## 2022-04-23 NOTE — PROGRESS NOTES
To Dr Wood Jacome : thank you for following up with patient. Patient is concerned taking Fluoxetine and Escitalopram.  Please advise.

## 2022-04-23 NOTE — PROGRESS NOTES
Patient seen and examined. No acute events overnight. Afebrile hemodynamically stable. Blood cultures NGTD. S/p I&D of left first metatarsal phalangeal joint space abscess. Patient has osteomyelitis of the left great toe extending into the first metatarsal head along with fractures and erosions and I do not feel that this is amendable to medical management and I have recommended a ray amputation. Pt NPO p MN. We will proceed to the OR this a.m. for surgery. Consent to be signed and placed in chart. I did discuss the risk, benefits and alternatives and he agrees to proceed with surgical intervention. Continue with IV antibiotics.

## 2022-04-23 NOTE — BRIEF OP NOTE
Brief Postoperative Note      Patient: Annette Older  YOB: 1980  MRN: [id number]    Date of Procedure: 4/23/2022    Pre-Op Diagnosis: Multifocal OM of the left great toe and 1st metatarsal head    Post-Op Diagnosis: Same       Procedure(s):  LEFT RAY GREAT TOE AMPUTATION    Surgeon(s):  Gabbie Elizondo MD    Assistant:  * No surgical staff found *    Anesthesia: Monitor Anesthesia Care    Estimated Blood Loss (mL): less than 50     Complications: None    Specimens:   ID Type Source Tests Collected by Time Destination   A : left great toe bone Specimen Toe SURGICAL PATHOLOGY, CULTURE, SURGICAL Gabbie Elizondo MD 4/23/2022 0815    B : left great toe Specimen Toe SURGICAL PATHOLOGY Gabbie Elizondo MD 4/23/2022 0827        Implants:  * No implants in log *      Drains: * No LDAs found *    Findings:     Electronically signed by Gabbie Elizondo MD on 4/23/2022 at 8:51 AM

## 2022-04-23 NOTE — OP NOTE
Operative Note      Patient: Annette Older  YOB: 1980  MRN: [id number]    Date of Procedure: 4/23/2022    Detailed Description of Procedure:   Dictated.      Electronically signed by Gabbie Elizondo MD on 4/23/2022 at 8:52 AM

## 2022-04-23 NOTE — OP NOTE
[address] [place] [address], [address] [place] [address]                                OPERATIVE REPORT    PATIENT NAME: Anna Murphy                      :        1980  MED REC NO:   [id number]                          ROOM:         ACCOUNT NO:   [de-identified]                           ADMIT DATE: 2022  PROVIDER:     Nichole Salguero MD    DATE OF PROCEDURE:  2022    PREOPERATIVE DIAGNOSIS:  Multifocal osteomyelitis of left great toe and  first metatarsal head. PROCEDURE PERFORMED:  Ray amputation of the left great toe. SURGEON:  Nichole Salguero MD    ANESTHESIA:  Monitored anesthesia care with local analgesic. SPECIMENS:  Left great toe. I also sent bony fragments off for  anaerobic, aerobic cultures. COMPLICATIONS:  None. DRAINS:  None. ESTIMATED BLOOD LOSS:  Negligible. INTRAOPERATIVE FINDINGS:  As above. INDICATIONS FOR PROCEDURE:  As follows: The patient is a 58-year-old   male who presented to the hospital with swelling within the  left foot with cellulitic changes. X-ray along with MRI findings  revealed left great toe osteomyelitis also extending into the first  metatarsal head along with fragmentation of bones and erosions present. The patient had a diabetic ulceration which I did debride at the bedside  and I also drained an abscess within that first metatarsophalangeal  joint space. Due to the multiple areas of destruction of the bone and  the osteomyelitis, I felt that surgical intervention and ray amputation  was the best intervention. I did discuss all the risks, benefits and  alternatives of the procedure to the patient and I also made him aware  that the wound will be left open and we will then use different wound  care modalities like we have had to use in the past to get him healed.    I did discuss all the risks, benefits and alternatives and he was in  agreement to proceed with surgical intervention. He has already been  placed on IV antibiotics, cefepime and vancomycin. Operative consent  was signed and placed on the chart. DESCRIPTION OF PROCEDURE:  The patient was brought to the operating  room, placed in the supine position. Conscious sedation was then  performed per Anesthesia. The patient's left foot was then sterilely  prepped and draped in the standard surgical fashion. Universal time-out  was then performed verifying the patient, birth, site of surgery and we  were all in agreement to proceed. I then marked out a teardrop incision  that encompassed the medial aspect of the foot extending midway up along  with coming around the left great toe. Once a marking was then placed,  I then injected the skin and subcutaneous tissues with 1% lidocaine with  epinephrine. I then made the incision with a 15-blade scalpel,  dissected down through the skin, subcutaneous tissues with Bovie  electrocautery. I then went to the proximal portion of the first  metatarsal head, took an electric saw, came through and transected that  and then using Bovie electrocautery I was able to remove all the  underlying tissue with the left great toe attached as well. There were  bony fragments which were sent for anaerobic and aerobic cultures. Once  this was completed, I then copiously irrigated out the wound. I then  had a couple of spots of bleeding which I was able to suture ligate  these bleeding vessels with figure-of-eight 3-0 Vicryl sutures. I then  applied some bone wax along with some Fibrillar in the wound bed to  maintain hemostasis. Once this was in place, I then placed some  Betadine dressings on top along with 4 x 4 fluffs, ABD pads. I then  wrapped the foot with a Kerlix gauze followed by Coban. All sponge and  needle counts were correct at the end of the case.   He was then awoken  from anesthesia, transported back to the surgical potter in satisfactory  stable condition.         Olvin Coats MD    D: 04/23/2022 8:59:03       T: 04/23/2022 9:01:26     [email or web address]  Job#: [id number]     Doc#: [id number]    CC:

## 2022-04-23 NOTE — ANESTHESIA POSTPROCEDURE EVALUATION
Department of Anesthesiology  Postprocedure Note    Patient: Pio York  MRN: [id number]  YOB: 1980  Date of evaluation: 4/23/2022  Time:  8:57 AM     Procedure Summary     Date: 04/23/22 Room / Location: [address]    Anesthesia Start: 5631 Anesthesia Stop: 0837    Procedure: LEFT RAY GREAT TOE AMPUTATION (Left First Toe) Diagnosis: (acute osteomyolits)    Surgeons: Rosemarie Colby MD Responsible Provider: Rickie Lim MD    Anesthesia Type: MAC ASA Status: 3          Anesthesia Type: MAC    Karla Phase I:      Karla Phase II:      Last vitals: Reviewed and per EMR flowsheets.        Anesthesia Post Evaluation    Patient location during evaluation: bedside  Patient participation: complete - patient participated  Level of consciousness: awake and alert  Pain score: 0  Airway patency: patent  Nausea & Vomiting: no nausea and no vomiting  Complications: no  Cardiovascular status: blood pressure returned to baseline  Respiratory status: acceptable, spontaneous ventilation and room air

## 2022-04-23 NOTE — OP NOTE
[address], [address] [place] [address]                                OPERATIVE REPORT    PATIENT NAME: Myla Lynch                      :        1980  MED REC NO:   [id number]                          ROOM:         ACCOUNT NO:   [de-identified]                           ADMIT DATE: 2022  PROVIDER:     Casey Rich MD    DATE OF PROCEDURE:  2022    BEDSIDE PROCEDURAL NOTE    PREOPERATIVE DIAGNOSIS:  Left foot deep abscess. POSTOPERATIVE DIAGNOSIS:  Left foot deep abscess. PROCEDURE PERFORMED:  Incision and drainage of the left foot deep  abscess at the first metatarsal phalangeal joint. SURGEON:  Casey Rich MD    ANESTHESIA:  None. COMPLICATIONS:  None. DRAINS:  None. BIOPSY/SPECIMENS:  None. ESTIMATED BLOOD LOSS:  Negligible. INTRAOPERATIVE FINDINGS:  Purulent material was drained from the first  metatarsal joint space, and I could also appreciate multiple bony  fragments and bone erosions in this area consistent with osteomyelitis. Iodoform packing was placed. INDICATIONS FOR PROCEDURE:  As follows: The patient is a 35-year-old   male, who has a history of insulin-dependent diabetes mellitus  and has been recently admitted to the hospital for left foot swelling  along with erythematous changes consistent with cellulitis. He was  placed on IV antibiotics, and I was then consulted surgically. He did  have a left foot x-ray, which did reveal osteomyelitis present within  that left great toe and also within the first metatarsal head, which  changes to some other bones as well. I then ordered an MRI, which did  reveal an abscess, and the patient was clinically improving, but I did  recommend that I first drain this abscess at the bedside, and then we  will continue to see how he does. DESCRIPTION OF PROCEDURE:  As follows:   At the bedside, I just sterilely  prepped the left great toe near the first metatarsophalangeal joint  where he already had an ulceration present, and then, he also had an  ulceration on the plantar aspect and my suspicion was that these were  actually communicating from the abscess in the joint cavity. After I  had sterilely prepped this area, I then took sterile scissors, with  forceps I was able to remove the necrotic tissue and then able to break  into the joint space room and with the evacuation of purulent material.   There was a communication all the way to the plantar aspect. I mildly  irrigated this out with some normal saline solution and packed both of  these areas with quarter-inch iodoform gauze. Due to the patient's  severe diabetic neuropathy, he had no pain whatsoever during my entire  procedure. At this time, I then placed absorptive dressings with a  Kerlix roll, and I will reevaluate in the morning, and I do feel this  patient likely is going to at least need the left great toe amputated  along with the first metatarsal head because of the MRI and also on the  procedure, I could feel bony fragments, bone erosions, and this is very  suggestive of osteomyelitis, which will not heal unless surgical  intervention is implemented.         Sd Rm MD    D: 04/23/2022 6:52:07       T: 04/23/2022 6:54:28     [email or web address]  Job#: [id number]     Doc#: [id number]    CC:

## 2022-04-23 NOTE — CONSULTS
[address], [facility]                                  CONSULTATION    PATIENT NAME: Radha Miller                      :        1980  MED REC NO:   [id number]                          ROOM:  ACCOUNT NO:   [de-identified]                           ADMIT DATE: 2022  PROVIDER:     Maritza Brady MD    CONSULT DATE:  2022    REASON FOR THE CONSULTATION:  Left foot cellulitis with osteomyelitis of  the left great toe, possible need for surgical intervention. CHIEF COMPLAINT AND HISTORY OF PRESENT ILLNESS:  As follows. The  patient is a 51-year-old  male who has a history of  insulin-dependent diabetes mellitus with some issues of noncompliance  and I have intermittently seen him at the [facility]  where I supervised some of his hyperbaric oxygen therapy sessions. He  has previously been taken care of by Dr. Rey Funes one of local  podiatrists but he will no longer take care of him because of history of  noncompliance following some surgeries that he has performed. He does  have a history of right TMA and he presented to the hospital on   complaining of generalized malaise, fatigue along with increased pain  and swelling of the left lower extremity over the last month, which has  then got worse and he also started to note some erythematous changes. He does obviously have this history of diabetic ulcers with infections,  which did require surgical intervention. On that evaluation, he did  have a left foot x-ray, which did reveal fragmentation and osteopenia  throughout the first metatarsal and also bony destruction of the left  great toe proximal phalanx with soft tissue swelling and erosions and  these were findings concerning for acute osteomyelitis.   He also had  some bony destruction of the distal tuft, distal phalanx of the second  digit also concerning for osteomyelitis. He was shown to have a slight  leukocytosis of 14,000 and on his admission he was placed on the  antibiotics cefepime along with vancomycin. Blood cultures were drawn,  which have no growth to date and then he also had a wound culture  performed on the ulceration on that left foot near the first  metatarsophalangeal joint area and it did reveal some light growth of  Enterobacter cloacae, which is sensitive to the cefepime he has been on. Due to his x-ray findings, concern for osteomyelitis and need for  possible surgical intervention, I have been consulted. I did have the  nurse reach out to see if Dr. Everardo Barba would see the patient and he  refused due to the history of noncompliance. PAST MEDICAL HISTORY:  Essential hypertension, hyperlipidemia,  insulin-dependent diabetes mellitus, lumbar radiculopathy. PAST SURGICAL HISTORY:  He has had a cardiac catheterization in 2018,  wisdom tooth extraction, prior right TMA, bilateral hernia repairs,  lumbar laminectomy. ALLERGIES:  No known drug allergies. FAMILY HISTORY:  Noncontributory. SOCIAL HISTORY:  He is single. Denies smoking, occasional alcohol. No  IV drug abuse. MEDICATIONS:  Please refer to medication reconciliation sheet. REVIEW OF SYSTEMS:  A 10-point review of systems otherwise negative  unless stated as above in the history of present illness. PHYSICAL EXAMINATION:  VITAL SIGNS:  Temperature 97.9, respirations 18, heart rate 70, blood  pressure is 161/92, O2 sat 97% on room air. GENERAL:  Generally speaking, no acute distress. Alert, awake, and  oriented x 3. HEAD, EYES, EARS, NOSE, AND THROAT:  Normocephalic, atraumatic. Pupils  are equal, round, and reactive to light. Extraocular movements are  intact. Trachea is midline. No lymphadenopathy. Anicteric sclerae. Moist mucous membranes. NECK:  Supple. No JVD or bruits. HEART:  Positive S1, S2.  Normal sinus rhythms, no murmurs or gallops.   CHEST: Clear to auscultation bilaterally. Air entry is bilaterally  equal.  No rales, rhonchi, wheezes, or crackles. ABDOMEN:  Soft, nondistended. No rebound, no guarding. No hernias,  pulsations, or fluid shifts. No CVA tenderness. No organomegaly. No  inguinal lymphadenopathy. EXTREMITIES:  Negative for erythema, edema, cellulitis, or cyanosis  except for the left foot, which is swollen and has erythematous changes  and he also has a ulceration present at the first metatarsophalangeal  joint, which is communicating with the plantar aspect of the left foot. He has no sensation due to his severe diabetic neuropathy. Negative  Homans' sign bilaterally. No evidence of any superficial  thrombophlebitis. I did not appreciate any crepitus, but I can  appreciate bony fragments in the left great toe with compression based  upon his x-ray findings. ASSESSMENT AND PLAN:  A 35-year-old  male with history of  insulin-dependent diabetes mellitus. He now presents with left great  toe osteomyelitis and diabetic ulcerations. He is currently getting  treated with IV antibiotics due to a left foot infection with  cellulitis. I will go ahead and schedule an MRI for further evaluation  and then based upon those findings, I will then make a determination of  surgical interventions going to be required, which I suspect it will due  to the concerns of osteomyelitis. Unfortunately for this gentleman, I  do not feel if he has osteomyelitis, prolonged IV antibiotics and  hyperbaric oxygen therapy will resolve this issue and he may require  amputation of that left great toe. I will continue to follow his  clinical course closely. Continue to treat his comorbidities, tight  control of blood glucose levels. I would like to thank the Hospitalist Service for giving me the  opportunity to assist in his surgical care and evaluation.         Marylene Corporal, MD    D: 04/23/2022 7:33:20       T: 04/23/2022 7:37:40 [email or web address]  Job#: [id number]     Doc#: [id number]    CC:

## 2022-04-23 NOTE — PLAN OF CARE
Problem: Anxiety  Goal: Will report anxiety at manageable levels  Description: INTERVENTIONS:  1. Administer medication as ordered  2. Teach and rehearse alternative coping skills  3.  Provide emotional support with 1:1 interaction with staff  Outcome: Progressing

## 2022-04-23 NOTE — PROGRESS NOTES
To Dr Belle Hamlin: Dr Belle Hamlin- patient is having anxiety about his homebound mother. Please order Ativan or something to help him through this time.   Thank you

## 2022-04-23 NOTE — PROGRESS NOTES
Infectious Disease Progress Note  2022   Patient Name: Katlin Delarosa : 1980       Reason for visit: F/u left foot diabetic ulcer, osteomyelitis, and abscess. ? History:? Interval history noted-status post left hallux ray amputation on 2022. Denies n/v/d/f or untoward effects of antimicrobials  Physical Exam:  Vital Signs: BP (!) 142/84   Pulse 76   Temp 98.1 °F (36.7 °C) (Oral)   Resp 9   Ht 6' 0.01\" (1.829 m)   Wt 190 lb 14.7 oz (86.6 kg)   SpO2 98%   BMI 25.89 kg/m²     Gen: alert and oriented X3, no distress  Skin: no stigmata of endocarditis  Wounds: Left foot dressing C/D/I  HEMT: AT/NC Oropharynx pink, moist, and without lesions or exudates  Eyes: PERRLA, EOMI, conjunctiva pink, sclera anicteric. Neck: Supple. Trachea midline. No LAD. Chest: no distress and CTA. Good air movement. Heart: RRR and no MRG. Abd: soft, non-distended, no tenderness, no hepatomegaly. Normoactive bowel sounds. Ext: no clubbing, cyanosis, or edema  Catheter Site: without erythema or tenderness  LDA:   Neuro: Mental status intact. CN 2-12 intact and no focal sensory or motor deficits     Radiologic / Imaging / TESTING  No results found.      Labs:    Recent Results (from the past 24 hour(s))   POCT Glucose    Collection Time: 22  5:51 PM   Result Value Ref Range    POC Glucose 184 (H) 70 - 99 MG/DL   POCT Glucose    Collection Time: 22  8:56 PM   Result Value Ref Range    POC Glucose 171 (H) 70 - 99 MG/DL   POCT Glucose    Collection Time: 22  2:17 AM   Result Value Ref Range    POC Glucose 156 (H) 70 - 99 MG/DL   POCT Glucose    Collection Time: 22  7:27 AM   Result Value Ref Range    POC Glucose 169 (H) 70 - 99 MG/DL   POCT Glucose    Collection Time: 22 12:21 PM   Result Value Ref Range    POC Glucose 231 (H) 70 - 99 MG/DL   Sedimentation Rate    Collection Time: 22 12:49 PM   Result Value Ref Range    Sed Rate 81 (H) 0 - 15 MM/HR     CULTURE results: Anyi Alvarez input(s): BLOOD CULTURE,  URINE CULTURE, SURGICAL CULTURE    Diagnosis:  Past Medical History:   Diagnosis Date    Abscess of left foot 4/22/2022    Callus of foot     Right foot - see's Dr. Christal Harmon Cellulitis of left foot 4/22/2022    Diabetic ulcer of left midfoot associated with type 2 diabetes mellitus, with necrosis of muscle ([address] 6/7/2021    Diabetic ulcer of right midfoot associated with type 2 diabetes mellitus, with fat layer exposed ([address] 8/11/2020    Dizziness     positional    Essential hypertension     Follows with PCP    Hyperlipidemia     Lumbar radiculopathy     Septic embolism ([address] 6/13/2020    Subacute osteomyelitis of left foot ([address] 4/22/2022     Past Surgical History:   Procedure Laterality Date    ACHILLES TENDON SURGERY Right 1/8/2021    RIGHT ACHILLES TENDON LENGTHENING REPAIR performed by Santiago Yi DPM at [address]  03/2018    [address] [place] [address] 51    DENTAL SURGERY      teeth extractions -half per patient    HERNIA REPAIR Bilateral 5/27/2020    BIALTERAL HERNIA INGUINAL REPAIR performed by Bony Bolanos MD at [facility]  2018    MN OFFICE/OUTPT VISIT,PROCEDURE ONLY Left 4/17/2018    L5-S1 HEMILAMINECTOMY, REMOVAL OF [place] [address] LEFT SIDE performed by Felicitas Trent MD at [address] Right 1/8/2021    RIGHT TRANSMETATARSAL TOE AMPUTATION performed by Santiago Yi DPM at [address] EXTRACTION       Principal Problem:    Acute osteomyelitis ([address]  Active Problems:    Abscess of left foot    Cellulitis of left foot    Subacute osteomyelitis of left foot ([address]  Resolved Problems:    * No resolved hospital problems. *      Impression and plan   Summary and rationale: Patient is a 39 y.o.  male medical history of poorly controlled diabetes mellitus, diabetic polyneuropathy, known to me from a previous admission in January 2021 for right foot diabetic osteomyelitis.   Now presents with left foot diabetic ulcer. MRI revealing osteomyelitis of the left hallux and first metatarsal, along with abscess. Dr. Madeline Corral performed a ray amputation of the left great toe on 4/23/2022.  Clinical status: Improving   Objective: Evaluate for residual osteomyelitis, if present then he will need 6 weeks of  antibiotics   Therapeutic: Vancomycin, cefepime and metronidazole   Diagnostic: Trend CRP   F/u: Surgical culture and histopathology.    Other:        Electronically signed by: Electronically signed by Francy Roy MD on 4/23/2022 at 1:44 PM

## 2022-04-23 NOTE — CONSULTS
[facility]  consulted by RAFAEL London-CNP for monitoring and adjustment. Indication for treatment: Sepsis 2/2 Left DFU with OM, Right Plantar DFU    Goal trough: [] 10-15 mcg/mL or [x] 15-20 mcg/ml  AUC/LIDIA: [] <500 or [] 400-600    Pertinent Laboratory Values:   Temp Readings from Last 3 Encounters:   04/23/22 98.1 °F (36.7 °C) (Oral)   11/04/21 98.1 °F (36.7 °C) (Temporal)   08/26/21 97.3 °F (36.3 °C) (Temporal)     No results for input(s): WBC, LACTATE in the last 72 hours. Recent Labs     04/21/22  1429 04/22/22  0412   BUN 5* 5*   CREATININE 0.7* 0.7*     Estimated Creatinine Clearance: 152 mL/min (A) (based on SCr of 0.7 mg/dL (L)). Intake/Output Summary (Last 24 hours) at 4/23/2022 1219  Last data filed at 4/23/2022 0111  Gross per 24 hour   Intake --   Output 1000 ml   Net -1000 ml     Vancomycin level:   TROUGH:    No results for input(s): VANCOTROUGH in the last 72 hours.   RANDOM:    Recent Labs     04/21/22  1429   VANCORANDOM 17.0       Assessment:  · SCr, BUN, and urine output:  · Scr trends are stable  · BUN improved  · Continue to monitor closely  · Day(s) of therapy: 6  · Vancomycin concentration:   · 4/20:  13.6, 10h post-dose, , therapeutic (1250 q12h)  · 4/21: 17, 5.5h post-dose, , therapeutic (1250 q12h)    Plan:  · Continue vancomycin 1250mg IV every 12 hours  · Predicted trough: 12.2,   · Repeat next trough level in 24h  · Pharmacy will continue to monitor patient and adjust therapy as indicated    VANCOMYCIN CONCENTRATION SCHEDULED FOR 4/24 @ 1300    Thank you for the consult,  [facility], [address] [place] [address], PharmD  4/23/2022 12:19 PM

## 2022-04-24 LAB
ANION GAP SERPL CALCULATED.3IONS-SCNC: 10 MMOL/L (ref 4–16)
BASOPHILS ABSOLUTE: 0 K/CU MM
BASOPHILS RELATIVE PERCENT: 0.3 % (ref 0–1)
BUN BLDV-MCNC: 4 MG/DL (ref 6–23)
CALCIUM SERPL-MCNC: 8.2 MG/DL (ref 8.3–10.6)
CHLORIDE BLD-SCNC: 102 MMOL/L (ref 99–110)
CO2: 26 MMOL/L (ref 21–32)
CREAT SERPL-MCNC: 0.6 MG/DL (ref 0.9–1.3)
DIFFERENTIAL TYPE: ABNORMAL
DOSE AMOUNT: NORMAL
DOSE TIME: NORMAL
EOSINOPHILS ABSOLUTE: 0.1 K/CU MM
EOSINOPHILS RELATIVE PERCENT: 1.1 % (ref 0–3)
GFR AFRICAN AMERICAN: >60 ML/MIN/1.73M2
GFR NON-AFRICAN AMERICAN: >60 ML/MIN/1.73M2
GLUCOSE BLD-MCNC: 116 MG/DL (ref 70–99)
GLUCOSE BLD-MCNC: 116 MG/DL (ref 70–99)
GLUCOSE BLD-MCNC: 120 MG/DL (ref 70–99)
GLUCOSE BLD-MCNC: 184 MG/DL (ref 70–99)
GLUCOSE BLD-MCNC: 204 MG/DL (ref 70–99)
GLUCOSE BLD-MCNC: 266 MG/DL (ref 70–99)
GLUCOSE BLD-MCNC: 68 MG/DL (ref 70–99)
GLUCOSE BLD-MCNC: 78 MG/DL (ref 70–99)
HCT VFR BLD CALC: 30.6 % (ref 42–52)
HEMOGLOBIN: 9.6 GM/DL (ref 13.5–18)
IMMATURE NEUTROPHIL %: 0.2 % (ref 0–0.43)
LYMPHOCYTES ABSOLUTE: 1.8 K/CU MM
LYMPHOCYTES RELATIVE PERCENT: 18.5 % (ref 24–44)
MCH RBC QN AUTO: 29.2 PG (ref 27–31)
MCHC RBC AUTO-ENTMCNC: 31.4 % (ref 32–36)
MCV RBC AUTO: 93 FL (ref 78–100)
MONOCYTES ABSOLUTE: 0.8 K/CU MM
MONOCYTES RELATIVE PERCENT: 8 % (ref 0–4)
NUCLEATED RBC %: 0 %
PDW BLD-RTO: 13 % (ref 11.7–14.9)
PLATELET # BLD: 577 K/CU MM (ref 140–440)
PMV BLD AUTO: 8.4 FL (ref 7.5–11.1)
POTASSIUM SERPL-SCNC: 4.1 MMOL/L (ref 3.5–5.1)
RBC # BLD: 3.29 M/CU MM (ref 4.6–6.2)
SEGMENTED NEUTROPHILS ABSOLUTE COUNT: 7 K/CU MM
SEGMENTED NEUTROPHILS RELATIVE PERCENT: 71.9 % (ref 36–66)
SODIUM BLD-SCNC: 138 MMOL/L (ref 135–145)
TOTAL IMMATURE NEUTOROPHIL: 0.02 K/CU MM
TOTAL NUCLEATED RBC: 0 K/CU MM
VANCOMYCIN TROUGH: 13.7 UG/ML (ref 10–20)
WBC # BLD: 9.7 K/CU MM (ref 4–10.5)

## 2022-04-24 PROCEDURE — 6360000002 HC RX W HCPCS: Performed by: SURGERY

## 2022-04-24 PROCEDURE — 6370000000 HC RX 637 (ALT 250 FOR IP): Performed by: SURGERY

## 2022-04-24 PROCEDURE — 2500000003 HC RX 250 WO HCPCS: Performed by: SURGERY

## 2022-04-24 PROCEDURE — 6370000000 HC RX 637 (ALT 250 FOR IP): Performed by: INTERNAL MEDICINE

## 2022-04-24 PROCEDURE — 2060000000 HC ICU INTERMEDIATE R&B

## 2022-04-24 PROCEDURE — 2580000003 HC RX 258: Performed by: SURGERY

## 2022-04-24 PROCEDURE — 85025 COMPLETE CBC W/AUTO DIFF WBC: CPT

## 2022-04-24 PROCEDURE — 80202 ASSAY OF VANCOMYCIN: CPT

## 2022-04-24 PROCEDURE — 6360000002 HC RX W HCPCS: Performed by: NURSE PRACTITIONER

## 2022-04-24 PROCEDURE — 80048 BASIC METABOLIC PNL TOTAL CA: CPT

## 2022-04-24 PROCEDURE — 94761 N-INVAS EAR/PLS OXIMETRY MLT: CPT

## 2022-04-24 PROCEDURE — 36415 COLL VENOUS BLD VENIPUNCTURE: CPT

## 2022-04-24 PROCEDURE — 82962 GLUCOSE BLOOD TEST: CPT

## 2022-04-24 RX ORDER — KETOROLAC TROMETHAMINE 30 MG/ML
30 INJECTION, SOLUTION INTRAMUSCULAR; INTRAVENOUS ONCE
Status: COMPLETED | OUTPATIENT
Start: 2022-04-24 | End: 2022-04-24

## 2022-04-24 RX ORDER — ENOXAPARIN SODIUM 100 MG/ML
1 INJECTION SUBCUTANEOUS 2 TIMES DAILY
Status: DISCONTINUED | OUTPATIENT
Start: 2022-04-24 | End: 2022-04-24

## 2022-04-24 RX ORDER — PROMETHAZINE HYDROCHLORIDE 25 MG/ML
25 INJECTION, SOLUTION INTRAMUSCULAR; INTRAVENOUS ONCE
Status: COMPLETED | OUTPATIENT
Start: 2022-04-24 | End: 2022-04-24

## 2022-04-24 RX ORDER — GLIPIZIDE 5 MG/1
5 TABLET ORAL
Status: DISCONTINUED | OUTPATIENT
Start: 2022-04-24 | End: 2022-04-27 | Stop reason: HOSPADM

## 2022-04-24 RX ADMIN — ESCITALOPRAM OXALATE 10 MG: 10 TABLET ORAL at 10:10

## 2022-04-24 RX ADMIN — ENOXAPARIN SODIUM 90 MG: 100 INJECTION SUBCUTANEOUS at 10:11

## 2022-04-24 RX ADMIN — METRONIDAZOLE 500 MG: 500 INJECTION, SOLUTION INTRAVENOUS at 06:29

## 2022-04-24 RX ADMIN — KETOROLAC TROMETHAMINE 30 MG: 30 INJECTION, SOLUTION INTRAMUSCULAR; INTRAVENOUS at 20:25

## 2022-04-24 RX ADMIN — SODIUM CHLORIDE, PRESERVATIVE FREE 10 ML: 5 INJECTION INTRAVENOUS at 20:26

## 2022-04-24 RX ADMIN — INSULIN GLARGINE 25 UNITS: 100 INJECTION, SOLUTION SUBCUTANEOUS at 03:39

## 2022-04-24 RX ADMIN — ASPIRIN 81 MG: 81 TABLET, COATED ORAL at 10:09

## 2022-04-24 RX ADMIN — LOSARTAN POTASSIUM 50 MG: 25 TABLET, FILM COATED ORAL at 10:10

## 2022-04-24 RX ADMIN — FLUOXETINE 40 MG: 10 CAPSULE ORAL at 10:10

## 2022-04-24 RX ADMIN — SENNOSIDES AND DOCUSATE SODIUM 1 TABLET: 50; 8.6 TABLET ORAL at 10:10

## 2022-04-24 RX ADMIN — METFORMIN HYDROCHLORIDE 500 MG: 500 TABLET ORAL at 16:48

## 2022-04-24 RX ADMIN — PROMETHAZINE HYDROCHLORIDE 25 MG: 25 INJECTION INTRAMUSCULAR; INTRAVENOUS at 20:25

## 2022-04-24 RX ADMIN — VANCOMYCIN HYDROCHLORIDE 1250 MG: 10 INJECTION, POWDER, LYOPHILIZED, FOR SOLUTION INTRAVENOUS at 03:18

## 2022-04-24 RX ADMIN — METFORMIN HYDROCHLORIDE 500 MG: 500 TABLET ORAL at 09:25

## 2022-04-24 RX ADMIN — METRONIDAZOLE 500 MG: 500 INJECTION, SOLUTION INTRAVENOUS at 15:51

## 2022-04-24 RX ADMIN — SODIUM CHLORIDE, PRESERVATIVE FREE 10 ML: 5 INJECTION INTRAVENOUS at 10:16

## 2022-04-24 RX ADMIN — GLIPIZIDE 5 MG: 5 TABLET ORAL at 10:11

## 2022-04-24 RX ADMIN — METOPROLOL SUCCINATE 25 MG: 25 TABLET, EXTENDED RELEASE ORAL at 10:10

## 2022-04-24 RX ADMIN — VANCOMYCIN HYDROCHLORIDE 1250 MG: 10 INJECTION, POWDER, LYOPHILIZED, FOR SOLUTION INTRAVENOUS at 13:51

## 2022-04-24 RX ADMIN — ACETAMINOPHEN 650 MG: 325 TABLET ORAL at 16:48

## 2022-04-24 RX ADMIN — INSULIN LISPRO 2 UNITS: 100 INJECTION, SOLUTION INTRAVENOUS; SUBCUTANEOUS at 09:11

## 2022-04-24 RX ADMIN — ONDANSETRON 4 MG: 2 INJECTION INTRAMUSCULAR; INTRAVENOUS at 17:30

## 2022-04-24 RX ADMIN — PANTOPRAZOLE SODIUM 40 MG: 40 TABLET, DELAYED RELEASE ORAL at 06:28

## 2022-04-24 RX ADMIN — METRONIDAZOLE 500 MG: 500 INJECTION, SOLUTION INTRAVENOUS at 00:18

## 2022-04-24 RX ADMIN — CEFEPIME HYDROCHLORIDE 2000 MG: 2 INJECTION, POWDER, FOR SOLUTION INTRAVENOUS at 09:25

## 2022-04-24 RX ADMIN — APIXABAN 10 MG: 5 TABLET, FILM COATED ORAL at 20:26

## 2022-04-24 RX ADMIN — CEFEPIME HYDROCHLORIDE 2000 MG: 2 INJECTION, POWDER, FOR SOLUTION INTRAVENOUS at 15:53

## 2022-04-24 RX ADMIN — HYDROCODONE BITARTRATE AND ACETAMINOPHEN 2 TABLET: 5; 325 TABLET ORAL at 14:02

## 2022-04-24 RX ADMIN — CEFEPIME HYDROCHLORIDE 2000 MG: 2 INJECTION, POWDER, FOR SOLUTION INTRAVENOUS at 00:13

## 2022-04-24 ASSESSMENT — PAIN DESCRIPTION - PROGRESSION
CLINICAL_PROGRESSION: NOT CHANGED

## 2022-04-24 ASSESSMENT — PAIN DESCRIPTION - LOCATION
LOCATION: FOOT;HEAD
LOCATION: HEAD
LOCATION: HEAD

## 2022-04-24 ASSESSMENT — PAIN SCALES - GENERAL
PAINLEVEL_OUTOF10: 7
PAINLEVEL_OUTOF10: 0
PAINLEVEL_OUTOF10: 8
PAINLEVEL_OUTOF10: 7
PAINLEVEL_OUTOF10: 10

## 2022-04-24 ASSESSMENT — PAIN SCALES - WONG BAKER
WONGBAKER_NUMERICALRESPONSE: 0

## 2022-04-24 ASSESSMENT — PAIN DESCRIPTION - DESCRIPTORS: DESCRIPTORS: ACHING;DISCOMFORT

## 2022-04-24 ASSESSMENT — PAIN DESCRIPTION - ONSET: ONSET: SUDDEN

## 2022-04-24 ASSESSMENT — PAIN DESCRIPTION - PAIN TYPE
TYPE: ACUTE PAIN
TYPE: ACUTE PAIN

## 2022-04-24 ASSESSMENT — PAIN DESCRIPTION - ORIENTATION: ORIENTATION: LEFT

## 2022-04-24 ASSESSMENT — PAIN - FUNCTIONAL ASSESSMENT: PAIN_FUNCTIONAL_ASSESSMENT: ACTIVITIES ARE NOT PREVENTED

## 2022-04-24 NOTE — CARE COORDINATION
This RN CM to bedside to speak with pt per pt's request. Pt is concerned as he is to have a wound vac placed tomorrow and he is the primary caregiver in his home and his only form of transportation. I did go over [facility] with pt and contacting insurance for transportation. Pt is still interested in [facility] at discharge. [facility] is following pt. I did go over everything that [facility] will encompass for pt. He is also concerned as he is the primary caregiver for his mother. I did go over senior services for his mother and encouraged him to reach out to these services to help with his mother. Pt has a wheelchair and ramp at home. He is very perceptive to what all [facility] will entail and that they can help him learn to do daily ADLs with wound vac in place. I also explained that once wound vac is in place, he will most likely see PT /OT here in the hospital also so they can evaluate how he is doing with it in place and see what other DME he may need. Pt verbalized understanding and denies any further questions at this time. CM will continue to follow.

## 2022-04-24 NOTE — PROGRESS NOTES
Paged NP regarding patient c/o nausea and severe headache. Will await new orders. Will continue to monitor.

## 2022-04-24 NOTE — CONSULTS
[address] [facility]  consulted by KAMI Contreras for monitoring and adjustment    Indication for treatment: Left DFU with OM and abscess, R Plantar DFU    Goal trough: [] 10-15 mcg/mL or [x] 15-20 mcg/ml  AUC/LIDIA: [] <500 or [] 400-600    Pertinent Laboratory Values:   Temp Readings from Last 3 Encounters:   04/24/22 97.9 °F (36.6 °C) (Oral)   11/04/21 98.1 °F (36.7 °C) (Temporal)   08/26/21 97.3 °F (36.3 °C) (Temporal)     Recent Labs     04/24/22  1311   WBC 9.7     Recent Labs     04/22/22  0412 04/24/22  1311   BUN 5* 4*   CREATININE 0.7* 0.6*     Estimated Creatinine Clearance: 178 mL/min (A) (based on SCr of 0.6 mg/dL (L)). Intake/Output Summary (Last 24 hours) at 4/24/2022 1557  Last data filed at 4/24/2022 1247  Gross per 24 hour   Intake 2550 ml   Output 2850 ml   Net -300 ml     Vancomycin level:   TROUGH:    Recent Labs     04/24/22  1311   VANCOTROUGH 13.7     RANDOM:    No results for input(s): VANCORANDOM in the last 72 hours.     Assessment:  · SCr, BUN, and urine output:  · Scr trends are stable, WNL  · Continue to monitor closely with hx DM-II  · Day(s) of therapy: 7  · S/p amputation of L great toe (4/23/22)  · Vancomycin concentration:   · 4/20:  13.6, 10h post-dose, , therapeutic (1250 q12h)  · 4/21: 17, 5.5h post-dose, , therapeutic (1250 q12h)  · 4/24: 13.7, 10h post-dose, , therapeutic (1250 q12h)    Plan:  · Continue vancomycin 1250mg IV every 12 hours with a therapeutic level and AUC  · Predicted trough: 12,   · Repeat next level in 3-5 days  · ID notes patient will require 6 weeks of therapy if residual osteomyelitis present following amputation  · Pharmacy will continue to monitor patient and adjust therapy as indicated    Abram 3 4/28 @ 1300    Thank you for the consult,  Marilyn Brito, [address] [facility], PharmD  4/24/2022 3:57 PM

## 2022-04-24 NOTE — PROGRESS NOTES
To Dr Andrea Doherty : Patient has had two episodes of nausea and vomiting in the last 24 hours. Coorelated to timing of IV antibiotics Flagyl and Maxipime. Given Zofran both times for the nausea. Possible intolerance to IV antibiotics?

## 2022-04-24 NOTE — PROGRESS NOTES
V2.0  [facility] Hospitalist Progress Note      Name:  Belkys Casarez /Age/Sex: 1980  (39 y.o. male)   MRN & CSN:  [id number] & [id number] Encounter Date/Time: 2022 5:20 PM EDT    Location:  -A PCP: Su Mishra MD       Hospital Day: 7    Assessment and Plan:   Belkys Casarez is a 39 y.o. male with pmh of diabetes type 2 who presents with Acute osteomyelitis ([address]      Plan:  1: Left great toe's multifocal OM status post ray amputation of the great toe appreciate surgery input continue antibiotics and for NPWT tomorrow     2: Left peroneal DVT: Patient was on therapeutic Lovenox we will plan to start Eliquis    3: diabetes type 2: Uncontrolled appreciate endocrinology input patient was counseled    Diet ADULT DIET; Regular; 4 carb choices (60 gm/meal)   DVT Prophylaxis [x] Lovenox, []  Heparin, [] SCDs, [] Ambulation,  [] Eliquis, [] Xarelto  [] Coumadin   Code Status Full Code   Disposition From: Home  Expected Disposition: Home  Estimated Date of Discharge: 48 hours  Patient requires continued admission due to IV antibiotics and wound care   Surrogate Decision Maker/ POA      Subjective:     Chief Complaint: Wound Infection (left and right foot, swelling to left leg)       Belkys Casarez is a 39 y.o. male who presents with great toe with multifocal osteomyelitis patient is postop day #1 status post ray amputation of left great toe. Surgery is planning wound VAC placement tomorrow. The patient will need to follow-up in the wound clinic he also has a left peroneal DVT he is on Lovenox therapeutic dose plan to transition to Eliquis        Review of Systems:    Review of Systems        Objective:        Intake/Output Summary (Last 24 hours) at 2022 1720  Last data filed at 2022 1247  Gross per 24 hour   Intake 2430 ml   Output 2600 ml   Net -170 ml        Vitals:   Vitals:    22 1402   BP:    Pulse:    Resp: 15   Temp:    SpO2:        Physical Exam:     General: NAD  Eyes: EOMI  ENT: neck supple  Cardiovascular: Regular rate. Respiratory: Clear to auscultation  Gastrointestinal: Soft, non tender  Genitourinary: no suprapubic tenderness  Musculoskeletal: left Toe wrapped  Skin: warm, dry  Neuro: Alert. Psych: Mood appropriate.      Medications:   Medications:    enoxaparin  1 mg/kg SubCUTAneous BID    glipiZIDE  5 mg Oral QAM AC    metFORMIN  500 mg Oral BID WC    cefepime  2,000 mg IntraVENous Q8H    metroNIDAZOLE  500 mg IntraVENous Q8H    insulin glargine  25 Units SubCUTAneous Nightly    insulin lispro  0-6 Units SubCUTAneous 2 times per day    insulin lispro  0-12 Units SubCUTAneous TID WC    losartan  50 mg Oral Daily    aspirin  81 mg Oral Daily    escitalopram  10 mg Oral Daily    FLUoxetine  40 mg Oral Daily    metoprolol succinate  25 mg Oral Daily    pantoprazole  40 mg Oral QAM AC    atorvastatin  40 mg Oral Nightly    sodium chloride flush  5-40 mL IntraVENous 2 times per day    sennosides-docusate sodium  1 tablet Oral BID    vancomycin  1,250 mg IntraVENous Q12H      Infusions:    sodium chloride      dextrose       PRN Meds: LORazepam, 0.5 mg, Q4H PRN  sodium chloride flush, 5-40 mL, PRN  sodium chloride, , PRN  polyethylene glycol, 17 g, Daily PRN  acetaminophen, 650 mg, Q6H PRN   Or  acetaminophen, 650 mg, Q6H PRN  ondansetron, 4 mg, Q8H PRN   Or  ondansetron, 4 mg, Q6H PRN  glucose, 4 tablet, PRN  dextrose, 12.5 g, PRN  glucagon (rDNA), 1 mg, PRN  dextrose, 100 mL/hr, PRN  HYDROcodone 5 mg - acetaminophen, 1 tablet, Q4H PRN   Or  HYDROcodone 5 mg - acetaminophen, 2 tablet, Q4H PRN        Labs      Recent Results (from the past 24 hour(s))   POCT Glucose    Collection Time: 04/23/22  7:58 PM   Result Value Ref Range    POC Glucose 76 70 - 99 MG/DL   POCT Glucose    Collection Time: 04/24/22 12:02 AM   Result Value Ref Range    POC Glucose 204 (H) 70 - 99 MG/DL   POCT Glucose    Collection Time: 04/24/22  2:39 AM   Result Value Ref Range    POC Glucose 266 (H) 70 - 99 MG/DL   POCT Glucose    Collection Time: 04/24/22  8:50 AM   Result Value Ref Range    POC Glucose 184 (H) 70 - 99 MG/DL   POCT Glucose    Collection Time: 04/24/22 12:47 PM   Result Value Ref Range    POC Glucose 78 70 - 99 MG/DL   CBC with Auto Differential    Collection Time: 04/24/22  1:11 PM   Result Value Ref Range    WBC 9.7 4.0 - 10.5 K/CU MM    RBC 3.29 (L) 4.6 - 6.2 M/CU MM    Hemoglobin 9.6 (L) 13.5 - 18.0 GM/DL    Hematocrit 30.6 (L) 42 - 52 %    MCV 93.0 78 - 100 FL    MCH 29.2 27 - 31 PG    MCHC 31.4 (L) 32.0 - 36.0 %    RDW 13.0 11.7 - 14.9 %    Platelets 699 (H) 506 - 440 K/CU MM    MPV 8.4 7.5 - 11.1 FL    Differential Type AUTOMATED DIFFERENTIAL     Segs Relative 71.9 (H) 36 - 66 %    Lymphocytes % 18.5 (L) 24 - 44 %    Monocytes % 8.0 (H) 0 - 4 %    Eosinophils % 1.1 0 - 3 %    Basophils % 0.3 0 - 1 %    Segs Absolute 7.0 K/CU MM    Lymphocytes Absolute 1.8 K/CU MM    Monocytes Absolute 0.8 K/CU MM    Eosinophils Absolute 0.1 K/CU MM    Basophils Absolute 0.0 K/CU MM    Nucleated RBC % 0.0 %    Total Nucleated RBC 0.0 K/CU MM    Total Immature Neutrophil 0.02 K/CU MM    Immature Neutrophil % 0.2 0 - 0.43 %   Basic Metabolic Panel    Collection Time: 04/24/22  1:11 PM   Result Value Ref Range    Sodium 138 135 - 145 MMOL/L    Potassium 4.1 3.5 - 5.1 MMOL/L    Chloride 102 99 - 110 mMol/L    CO2 26 21 - 32 MMOL/L    Anion Gap 10 4 - 16    BUN 4 (L) 6 - 23 MG/DL    CREATININE 0.6 (L) 0.9 - 1.3 MG/DL    Glucose 68 (L) 70 - 99 MG/DL    Calcium 8.2 (L) 8.3 - 10.6 MG/DL    GFR Non-African American >60 >60 mL/min/1.73m2    GFR African American >60 >60 mL/min/1.73m2   Vancomycin Level, Trough    Collection Time: 04/24/22  1:11 PM   Result Value Ref Range    Vancomycin Tr 13.7 10 - 20 UG/ML    DOSE AMOUNT DOSE AMT.  GIVEN - UNKNOWN     DOSE TIME DOSE TIME GIVEN - UNKNOWN    POCT Glucose    Collection Time: 04/24/22  4:40 PM   Result Value Ref Range    POC Glucose 116 (H) 70 - 99 MG/DL Imaging/Diagnostics Last 24 Hours   No results found.     Electronically signed by Kiel Wu MD on 4/24/2022 at 5:20 PM

## 2022-04-24 NOTE — PROGRESS NOTES
Progress Note( Dr. Grace Colby)  4/24/2022  Subjective:   Admit Date: 4/18/2022  PCP: Kennedy Alvarez MD        Admitted For :Infection of the left foot possible osteomyelitis    Consulted For: Better control of blood glucose    Interval History: Feels lot better. Patient had amputation of left big toe and part of second left second toe on 4/23/2022  As expected complaining of some pain in the operative site    Denies any chest pains,   Denies SOB . Denies nausea or vomiting. No new bowel or bladder symptoms. Intake/Output Summary (Last 24 hours) at 4/24/2022 1618  Last data filed at 4/24/2022 1247  Gross per 24 hour   Intake 2550 ml   Output 2600 ml   Net -50 ml       DATA    CBC:   Recent Labs     04/24/22  1311   WBC 9.7   HGB 9.6*   *    CMP:  Recent Labs     04/22/22  0412 04/24/22  1311    138   K 4.2 4.1    102   CO2 26 26   BUN 5* 4*   CREATININE 0.7* 0.6*   CALCIUM 8.0* 8.2*     Lipids:   Lab Results   Component Value Date    CHOL 108 03/13/2021    CHOL 142 03/12/2020    HDL 27 03/13/2021    TRIG 178 03/13/2021     Glucose:  Recent Labs     04/24/22  0239 04/24/22  0850 04/24/22  1247   POCGLU 266* 184* 78     [id number]:  Lab Results   Component Value Date    LABA1C 10.4 04/18/2022     High Sensitivity TSH:   Lab Results   Component Value Date    TSHHS 1.770 03/12/2021     Free T3: No results found for: FT3  Free T4:No results found for: T4FREE    MRI FOOT LEFT W WO CONTRAST   Preliminary Result   Acute osteomyelitis involving the entire 1st metatarsal as well as the 1st   digit phalanges, with associated osseous erosions and pathologic fractures. There is a soft tissue abscess centered at the level of the 1st metatarsal   head measuring approximately 4.5 x 3.4 x 3.7 cm. Smaller suspected abscess   is seen at the level of the 1st IP joint. Bone marrow edema is seen in the 2nd-4th metatarsals, all the cuneiform, and   2nd proximal and distal phalanges.   There are associated areas of T1 marrow   replacement. Acute osteomyelitis should be suspected. Cellulitis. Prior amputation of the 3rd digit phalanges and a portion of the 3rd distal   metatarsal.         VL DUP LOWER EXTREMITY VENOUS LEFT   Final Result   Addendum 1 of 1   ADDENDUM:   Critical results were called by Dr. Radha Dominique to [facility] on    4/18/2022   at 14:11. Final   1. Occlusive thrombus in 1 of the peroneal veins. 2. Similar inguinal lymphadenopathy since at least 2021. XR FOOT RIGHT (MIN 3 VIEWS)   Final Result   Chest No acute periostitis or bony destruction. Linear ulcer adjacent to the   1st metatarsal remnant. XR FOOT LEFT (MIN 3 VIEWS)   Final Result   Interval fragmentation and osteopenia throughout the 1st metatarsal.  Absence   or bony destruction of the proximal phalanx 1st digit with diffuse soft   tissue swelling. Additional bony destruction in the proximal phalanx. Overall findings concerning for acute osteomyelitis. Correlate for any   interval surgery. Bony destruction of the distal tuft, distal phalanx 2nd digit concerning for   acute osteomyelitis. Questionable osteomyelitis in the 2nd metatarsal head.               Scheduled Medicines   Medications:    enoxaparin  1 mg/kg SubCUTAneous BID    glipiZIDE  5 mg Oral QAM AC    metFORMIN  500 mg Oral BID WC    cefepime  2,000 mg IntraVENous Q8H    metroNIDAZOLE  500 mg IntraVENous Q8H    insulin glargine  25 Units SubCUTAneous Nightly    insulin lispro  0-6 Units SubCUTAneous 2 times per day    insulin lispro  0-12 Units SubCUTAneous TID WC    losartan  50 mg Oral Daily    aspirin  81 mg Oral Daily    escitalopram  10 mg Oral Daily    FLUoxetine  40 mg Oral Daily    metoprolol succinate  25 mg Oral Daily    pantoprazole  40 mg Oral QAM AC    atorvastatin  40 mg Oral Nightly    sodium chloride flush  5-40 mL IntraVENous 2 times per day    sennosides-docusate sodium  1 tablet Oral BID    vancomycin  1,250 mg IntraVENous Q12H      Infusions:    sodium chloride      dextrose           Objective:   Vitals: BP (!) 165/94   Pulse 82   Temp 97.9 °F (36.6 °C) (Oral)   Resp 15   Ht 6' 0.01\" (1.829 m)   Wt 190 lb 14.7 oz (86.6 kg)   SpO2 100%   BMI 25.89 kg/m²   General appearance: alert and cooperative with exam  Neck: no JVD or bruit  Thyroid : Normal lobes   Lungs: Has Vesicular Breath sounds   Heart:  regular rate and rhythm  Abdomen: soft, non-tender; bowel sounds normal; no masses,  no organomegaly  Musculoskeletal: Normal  Extremities: extremities normal, , no edema right foot partial amputation//Patient had amputation of left big toe and part of second left second toe on 4/23/2022  Neurologic:  Awake, alert, oriented to name, place and time. Cranial nerves II-XII are grossly intact. Motor is  intact. Sensory neuropathy. ,  and gait is abnormal.  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 ([facility])     WD-Diabetic ulcer of toe of left foot associated with type 2 diabetes mellitus, with necrosis of muscle (HCC)     WD-Diabetic ulcer of right midfoot associated with type 2 diabetes mellitus, with fat layer exposed ([address]     Diabetic foot (HCC)     Moderate nonproliferative diabetic retinopathy of both eyes without macular edema associated with type 2 diabetes mellitus ([address]     Acute osteomyelitis (HCC)     Acute osteomyelitis of foot ([address]     Abscess of left foot     Cellulitis of left foot     Subacute osteomyelitis of left foot ([address]     Patient had amputation of left big toe and part of second left second toe on 4/23/2022    Plan:     1. Reviewed POC blood glucose . Labs and X ray results   2. Reviewed Current Medicines   3. On meal/ Correction bolus Humalog/ Basal Lantus Insulin regime   4. Monitor Blood glucose frequently   5. Modified  the dose of Insulin/ other medicines as needed   6. Will follow     .      Anayeli Harmon MD, MD

## 2022-04-24 NOTE — PLAN OF CARE
Problem: Pain:  Goal: Pain level will decrease  Description: Pain level will decrease  Outcome: Progressing  Goal: Control of acute pain  Description: Control of acute pain  Outcome: Progressing  Goal: Control of chronic pain  Description: Control of chronic pain  Outcome: Progressing     Problem: Falls - Risk of:  Goal: Will remain free from falls  Description: Will remain free from falls  Outcome: Progressing  Goal: Absence of physical injury  Description: Absence of physical injury  Outcome: Progressing     Problem: Nutrition  Goal: Optimal nutrition therapy  Outcome: Progressing     Problem: Discharge Planning  Goal: Discharge to home or other facility with appropriate resources  Outcome: Progressing     Problem: Chronic Conditions and Co-morbidities  Goal: Patient's chronic conditions and co-morbidity symptoms are monitored and maintained or improved  Outcome: Progressing     Problem: Skin/Tissue Integrity - Adult  Goal: Skin integrity remains intact  Outcome: Progressing  Goal: Incisions, wounds, or drain sites healing without S/S of infection  Outcome: Progressing  Goal: Oral mucous membranes remain intact  Outcome: Progressing     Problem: Musculoskeletal - Adult  Goal: Return mobility to safest level of function  Outcome: Progressing  Goal: Maintain proper alignment of affected body part  Outcome: Progressing  Goal: Return ADL status to a safe level of function  Outcome: Progressing     Problem: Decision Making  Goal: Pt/Family able to effectively weigh alternatives and participate in decision making related to treatment and care  Description: INTERVENTIONS:  1. Determine when there are differences between patient's view, family's view, and healthcare provider's view of condition  2. Facilitate patient and family articulation of goals for care  3. Help patient and family identify pros/cons of alternative solutions  4. Provide information as requested by patient/family  5.  Respect patient/family right to receive or not to receive information  6. Serve as a liaison between patient and family and health care team  7. Initiate Consults from Ethics, Palliative Care or initiate [address] [place] [address] as is appropriate  Outcome: Progressing     Problem: Anxiety  Goal: Will report anxiety at manageable levels  Description: INTERVENTIONS:  1. Administer medication as ordered  2. Teach and rehearse alternative coping skills  3. Provide emotional support with 1:1 interaction with staff  Outcome: Progressing     Problem: Coping  Goal: Pt/Family able to verbalize concerns and demonstrate effective coping strategies  Description: INTERVENTIONS:  1. Assist patient/family to identify coping skills, available support systems and cultural and spiritual values  2. Provide emotional support, including active listening and acknowledgement of concerns of patient and caregivers  3. Reduce environmental stimuli, as able  4. Instruct patient/family in relaxation techniques, as appropriate  5.  Assess for spiritual pain/suffering and initiate Spiritual Care, Psychosocial Clinical Specialist consults as needed  Outcome: Progressing     Problem: ABCDS Injury Assessment  Goal: Absence of physical injury  Outcome: Progressing

## 2022-04-24 NOTE — PROGRESS NOTES
POD 1 Ray amputation of the left great toe secondary to multifocal OM. No acute events overnight. AF VSS. Adequate UOP. Pt reports no pain in the left foot. BGL's 76 - 266  Left foot drsg c/d/i. I will change tomorrow and likely place NPWT. Plan for this upon discharge. He will see me at the [facility]. I did mention to him to consider HBOT. I did send bone fragments for cx, pending. Continue with IV abx's. Ok to bear some weight on left foot with postop shoe. On therapeutic lovenox for left peroneal DVT. Consider transition to oral [employer] for discharge planning.    Rigo Jaramillo

## 2022-04-25 LAB
ALBUMIN SERPL-MCNC: 3.4 GM/DL (ref 3.4–5)
ANION GAP SERPL CALCULATED.3IONS-SCNC: 11 MMOL/L (ref 4–16)
BASOPHILS ABSOLUTE: 0 K/CU MM
BASOPHILS RELATIVE PERCENT: 0.4 % (ref 0–1)
BUN BLDV-MCNC: 6 MG/DL (ref 6–23)
CALCIUM SERPL-MCNC: 8.5 MG/DL (ref 8.3–10.6)
CHLORIDE BLD-SCNC: 100 MMOL/L (ref 99–110)
CO2: 26 MMOL/L (ref 21–32)
CREAT SERPL-MCNC: 0.7 MG/DL (ref 0.9–1.3)
DIFFERENTIAL TYPE: ABNORMAL
EOSINOPHILS ABSOLUTE: 0.1 K/CU MM
EOSINOPHILS RELATIVE PERCENT: 1.2 % (ref 0–3)
GFR AFRICAN AMERICAN: >60 ML/MIN/1.73M2
GFR NON-AFRICAN AMERICAN: >60 ML/MIN/1.73M2
GLUCOSE BLD-MCNC: 108 MG/DL (ref 70–99)
GLUCOSE BLD-MCNC: 115 MG/DL (ref 70–99)
GLUCOSE BLD-MCNC: 129 MG/DL (ref 70–99)
GLUCOSE BLD-MCNC: 143 MG/DL (ref 70–99)
GLUCOSE BLD-MCNC: 146 MG/DL (ref 70–99)
GLUCOSE BLD-MCNC: 194 MG/DL (ref 70–99)
HCT VFR BLD CALC: 33.1 % (ref 42–52)
HEMOGLOBIN: 10.2 GM/DL (ref 13.5–18)
IMMATURE NEUTROPHIL %: 0.6 % (ref 0–0.43)
LYMPHOCYTES ABSOLUTE: 1.5 K/CU MM
LYMPHOCYTES RELATIVE PERCENT: 18.6 % (ref 24–44)
MCH RBC QN AUTO: 28.7 PG (ref 27–31)
MCHC RBC AUTO-ENTMCNC: 30.8 % (ref 32–36)
MCV RBC AUTO: 93 FL (ref 78–100)
MONOCYTES ABSOLUTE: 0.5 K/CU MM
MONOCYTES RELATIVE PERCENT: 6.3 % (ref 0–4)
NUCLEATED RBC %: 0 %
PDW BLD-RTO: 13.4 % (ref 11.7–14.9)
PHOSPHORUS: 2.6 MG/DL (ref 2.5–4.9)
PLATELET # BLD: 602 K/CU MM (ref 140–440)
PMV BLD AUTO: 8.2 FL (ref 7.5–11.1)
POTASSIUM SERPL-SCNC: 3.8 MMOL/L (ref 3.5–5.1)
RBC # BLD: 3.56 M/CU MM (ref 4.6–6.2)
SEGMENTED NEUTROPHILS ABSOLUTE COUNT: 5.9 K/CU MM
SEGMENTED NEUTROPHILS RELATIVE PERCENT: 72.9 % (ref 36–66)
SODIUM BLD-SCNC: 137 MMOL/L (ref 135–145)
TOTAL IMMATURE NEUTOROPHIL: 0.05 K/CU MM
TOTAL NUCLEATED RBC: 0 K/CU MM
WBC # BLD: 8.1 K/CU MM (ref 4–10.5)

## 2022-04-25 PROCEDURE — 82962 GLUCOSE BLOOD TEST: CPT

## 2022-04-25 PROCEDURE — 6370000000 HC RX 637 (ALT 250 FOR IP): Performed by: SURGERY

## 2022-04-25 PROCEDURE — 97606 NEG PRS WND THER DME>50 SQCM: CPT

## 2022-04-25 PROCEDURE — 99232 SBSQ HOSP IP/OBS MODERATE 35: CPT | Performed by: NURSE PRACTITIONER

## 2022-04-25 PROCEDURE — 2500000003 HC RX 250 WO HCPCS: Performed by: SURGERY

## 2022-04-25 PROCEDURE — 6360000002 HC RX W HCPCS: Performed by: SURGERY

## 2022-04-25 PROCEDURE — 85025 COMPLETE CBC W/AUTO DIFF WBC: CPT

## 2022-04-25 PROCEDURE — 2060000000 HC ICU INTERMEDIATE R&B

## 2022-04-25 PROCEDURE — 6370000000 HC RX 637 (ALT 250 FOR IP): Performed by: INTERNAL MEDICINE

## 2022-04-25 PROCEDURE — 94761 N-INVAS EAR/PLS OXIMETRY MLT: CPT

## 2022-04-25 PROCEDURE — 2580000003 HC RX 258: Performed by: SURGERY

## 2022-04-25 PROCEDURE — 80069 RENAL FUNCTION PANEL: CPT

## 2022-04-25 PROCEDURE — 36415 COLL VENOUS BLD VENIPUNCTURE: CPT

## 2022-04-25 RX ADMIN — METFORMIN HYDROCHLORIDE 500 MG: 500 TABLET ORAL at 08:28

## 2022-04-25 RX ADMIN — HYDROCODONE BITARTRATE AND ACETAMINOPHEN 2 TABLET: 5; 325 TABLET ORAL at 16:33

## 2022-04-25 RX ADMIN — VANCOMYCIN HYDROCHLORIDE 1250 MG: 10 INJECTION, POWDER, LYOPHILIZED, FOR SOLUTION INTRAVENOUS at 02:14

## 2022-04-25 RX ADMIN — VANCOMYCIN HYDROCHLORIDE 1250 MG: 10 INJECTION, POWDER, LYOPHILIZED, FOR SOLUTION INTRAVENOUS at 14:20

## 2022-04-25 RX ADMIN — METRONIDAZOLE 500 MG: 500 INJECTION, SOLUTION INTRAVENOUS at 22:51

## 2022-04-25 RX ADMIN — SODIUM CHLORIDE, PRESERVATIVE FREE 10 ML: 5 INJECTION INTRAVENOUS at 22:29

## 2022-04-25 RX ADMIN — SENNOSIDES AND DOCUSATE SODIUM 1 TABLET: 50; 8.6 TABLET ORAL at 00:17

## 2022-04-25 RX ADMIN — INSULIN LISPRO 2 UNITS: 100 INJECTION, SOLUTION INTRAVENOUS; SUBCUTANEOUS at 10:14

## 2022-04-25 RX ADMIN — INSULIN LISPRO 1 UNITS: 100 INJECTION, SOLUTION INTRAVENOUS; SUBCUTANEOUS at 22:28

## 2022-04-25 RX ADMIN — CEFEPIME HYDROCHLORIDE 2000 MG: 2 INJECTION, POWDER, FOR SOLUTION INTRAVENOUS at 00:16

## 2022-04-25 RX ADMIN — METRONIDAZOLE 500 MG: 500 INJECTION, SOLUTION INTRAVENOUS at 16:41

## 2022-04-25 RX ADMIN — ASPIRIN 81 MG: 81 TABLET, COATED ORAL at 08:29

## 2022-04-25 RX ADMIN — GLIPIZIDE 5 MG: 5 TABLET ORAL at 08:44

## 2022-04-25 RX ADMIN — ACETAMINOPHEN 650 MG: 325 TABLET ORAL at 14:07

## 2022-04-25 RX ADMIN — CEFEPIME HYDROCHLORIDE 2000 MG: 2 INJECTION, POWDER, FOR SOLUTION INTRAVENOUS at 18:19

## 2022-04-25 RX ADMIN — PANTOPRAZOLE SODIUM 40 MG: 40 TABLET, DELAYED RELEASE ORAL at 08:35

## 2022-04-25 RX ADMIN — ATORVASTATIN CALCIUM 40 MG: 40 TABLET, FILM COATED ORAL at 22:33

## 2022-04-25 RX ADMIN — METRONIDAZOLE 500 MG: 500 INJECTION, SOLUTION INTRAVENOUS at 00:17

## 2022-04-25 RX ADMIN — METRONIDAZOLE 500 MG: 500 INJECTION, SOLUTION INTRAVENOUS at 06:56

## 2022-04-25 RX ADMIN — ATORVASTATIN CALCIUM 40 MG: 40 TABLET, FILM COATED ORAL at 00:17

## 2022-04-25 RX ADMIN — APIXABAN 10 MG: 5 TABLET, FILM COATED ORAL at 08:36

## 2022-04-25 RX ADMIN — SODIUM CHLORIDE, PRESERVATIVE FREE 10 ML: 5 INJECTION INTRAVENOUS at 10:18

## 2022-04-25 RX ADMIN — INSULIN GLARGINE 25 UNITS: 100 INJECTION, SOLUTION SUBCUTANEOUS at 22:28

## 2022-04-25 RX ADMIN — ESCITALOPRAM OXALATE 10 MG: 10 TABLET ORAL at 08:35

## 2022-04-25 RX ADMIN — SENNOSIDES AND DOCUSATE SODIUM 1 TABLET: 50; 8.6 TABLET ORAL at 08:36

## 2022-04-25 RX ADMIN — CEFEPIME HYDROCHLORIDE 2000 MG: 2 INJECTION, POWDER, FOR SOLUTION INTRAVENOUS at 08:45

## 2022-04-25 RX ADMIN — FLUOXETINE 40 MG: 10 CAPSULE ORAL at 08:28

## 2022-04-25 RX ADMIN — LOSARTAN POTASSIUM 50 MG: 25 TABLET, FILM COATED ORAL at 08:28

## 2022-04-25 RX ADMIN — SENNOSIDES AND DOCUSATE SODIUM 1 TABLET: 50; 8.6 TABLET ORAL at 22:34

## 2022-04-25 RX ADMIN — INSULIN LISPRO 1 UNITS: 100 INJECTION, SOLUTION INTRAVENOUS; SUBCUTANEOUS at 02:21

## 2022-04-25 RX ADMIN — METOPROLOL SUCCINATE 25 MG: 25 TABLET, EXTENDED RELEASE ORAL at 08:29

## 2022-04-25 ASSESSMENT — PAIN SCALES - GENERAL
PAINLEVEL_OUTOF10: 7
PAINLEVEL_OUTOF10: 0
PAINLEVEL_OUTOF10: 6
PAINLEVEL_OUTOF10: 0

## 2022-04-25 ASSESSMENT — PAIN DESCRIPTION - PROGRESSION

## 2022-04-25 ASSESSMENT — PAIN DESCRIPTION - LOCATION
LOCATION: HEAD
LOCATION: HEAD

## 2022-04-25 ASSESSMENT — PAIN DESCRIPTION - ORIENTATION
ORIENTATION: OTHER (COMMENT)
ORIENTATION: MID

## 2022-04-25 ASSESSMENT — PAIN DESCRIPTION - DESCRIPTORS
DESCRIPTORS: ACHING;DULL
DESCRIPTORS: ACHING;DISCOMFORT;THROBBING

## 2022-04-25 NOTE — PROGRESS NOTES
Comprehensive Nutrition Assessment    Type and Reason for Visit:  Reassess    Nutrition Recommendations/Plan:   1. Add 5 carb choice modifier to better meet estimated kcal needs  2. Add high protein and wound healing oral nutrition supplement  3. Encourage po intake as able  4. Please document all po intake  5. Please obtain updated measured weight so that nutrition status can be more accurately assessed     Malnutrition Assessment:  Malnutrition Status: At risk for malnutrition (Comment) (04/25/22 1015)  Context:  Acute Illness       Nutrition Assessment:    POD #2 s/p Ray amputation of the left great toe, pt currently on 4 carb choice diet, pt consumed 51-75% of meals documented in the past 24 hr, pt unavailable at time of visit, will continue to follow at moderate nutrition risk    Nutrition Related Findings:    POC glucose 143, 146 Wound Type: Diabetic Ulcer,Multiple       Current Nutrition Intake & Therapies:    Average Meal Intake: 51-75%  Average Supplements Intake: None Ordered  ADULT DIET; Regular; 4 carb choices (60 gm/meal)    Anthropometric Measures:  Height: 6' 0.01\" (182.9 cm)  Ideal Body Weight (IBW): 178 lbs (81 kg)    Admission Body Weight:  (n/a)  Current Body Weight: 190 lb 14.7 oz (86.6 kg) ((4/18)), 107.3 % IBW. Weight Source: Bed Scale  Current BMI (kg/m2): 25.9  Usual Body Weight:  (n/a)  Weight Adjustment For: No Adjustment  BMI Categories: Overweight (BMI 25.0-29. 9)    Estimated Daily Nutrient Needs:  Energy Requirements Based On: Formula  Weight Used for Energy Requirements: Admission  Energy (kcal/day): 5585-8230  Weight Used for Protein Requirements: Admission  Protein (g/day):   Method Used for Fluid Requirements: 1 ml/kcal  Fluid (ml/day): 2619-6064    Nutrition Diagnosis:   · Increased nutrient needs related to healing as evidenced by  multiple wounds    Nutrition Interventions:   Food and/or Nutrient Delivery: Continue Current Diet,Start Oral Nutrition Supplement  Nutrition Education/Counseling: Education needed  Coordination of Nutrition Care: Continue to monitor while inpatient     Goals:  Previous Goal Met: Progressing toward Goal(s)  Goals: PO intake 75% or greater,within 2 days     Nutrition Monitoring and Evaluation:   Behavioral-Environmental Outcomes: None Identified  Food/Nutrient Intake Outcomes: Food and Nutrient Intake,Supplement Intake  Physical Signs/Symptoms Outcomes: Biochemical Data,GI Status,Hemodynamic Status,Weight,Skin    Discharge Planning:     Too soon to determine     Seble [address], [address] [place] [address],   Contact: [phone]

## 2022-04-25 NOTE — PROGRESS NOTES
Infectious Disease Progress Note  2022   Patient Name: Dee Dee Brian : 1980   Impression  · Left DFU with OM:     ? Right Plantar DFU:     ? Acute Thrombus of the Left Peroneal Vein:     § Afebrile, no leukocytosis  § -BC 0/2-NGTD  § -MRSA/MSSA screen negative  § -Left Foot ulcer culture: Enterobacter cloacae  § -XR Foot Right: no acute periostitis or bony destruction. Linear ulcer adjacent to the 1st metatarsal remnant. § -VL Dup LLE: occlusive thrombus in 1 of the peroneal veins. Small inguinal lymphadenopathy since at least . § -Left Foot XR: imp of questionable OM  § -MRI Foot Left W WO Contrast: Acute OM involving the entire 1st metatarsal as well as the 1st digit phalanges, with associated osseous erosions and pathologic fractures. There is a soft tissue abscess centered at the level of the 1st metatarsal head measuring approx 4.5 x 3.4 x 3.7 cm. Smaller suspected abscess is seen at the level of the 1st IP joint. Bone marrow edema is seen in the 2nd-4th metatarsals, all the cuneform, and 2nd proximal and distal phalanges. There are associated areas of T1 marrow replacement. Acute OM should be suspected. Cellulitis. Prior amp of the 3rd digit phalanges and a portion of the 3rd distal metatarsal.  § Dr. Ashley Vora, cardiology, onboard to manage DVT. Rec Lovenox and transition to NOAC. § -Complete Echo: EF 55%, no mention of RV strain. § -s/p per Dr. Alfonzo Romero, bedside debridement of left foot  § -S/p per Dr. Alfonzo Romero: ray amputation of left great toe. DX:  Multifocal OM of left great toe and first metatarsal head. Cultures: NGTD.   Histopathology: Pending     · DMII, Uncontrolled:  ? Peripheral Neuropathy:  § Dr. Vipul Colorado onboard  § - HbAIC 10.4     ? HTN      ? HLD     ? Lumbar Radiculopathy     ? H/o Septic Embolism      · Multi-morbidity: per PMHx:  Bilateral inguinal hernia repair , lumbar laminectomy , right toe amputation 1/21.     Plan:  · Continue IV vancomycin per pharmacy dosing  ? Continue IV cefepime 2 gm q8h  ? Continue  IV Flagyl 500 mg q8h   · Trend CRP, ordered  ? Await 4/23 histopathology to evaluate margins for residual OM to determine duration of therapy. Ongoing Antimicrobial Therapy  Cefepime 4/22-  Flagyl 4/22-  Vancomycin 4/18-? Completed Antimicrobial Therapy  Zosyn 4/18-22?  ? History:? Interval history noted. Chief complaint: Left DFU with OM. Denies n/v/d/f or untoward effects of antibiotics. Resting quietly, states had one episode of N&V yesterday, has now resolved. Physical Exam:  Vital Signs: BP (!) 150/92   Pulse 71   Temp 97.7 °F (36.5 °C) (Oral)   Resp 16   Ht 6' 0.01\" (1.829 m)   Wt 190 lb 14.7 oz (86.6 kg)   SpO2 98%   BMI 23.05 kg/m²     Gen: alert and oriented X3, no distress, resting in bed  Wounds: C/D/I left foot with wound VAC intact with surrounding erythema and edema. Right foot with plantar ulceration, no drainage or erythema noted around site. HEMT: AT/NC Oropharynx pink, moist, and without lesions or exudates; dentition in good state of repair  Eyes: PERRLA, EOMI, conjunctiva pink, sclera anicteric. Neck: Supple. Trachea midline. No LAD. Chest: no distress and CTA. Good air movement. Room air. Heart: NSR and no MRG. Abd: soft, non-distended, no tenderness, no hepatomegaly. Normoactive bowel sounds. Ext: no clubbing, cyanosis, or edema, see wounds above  Neuro: Mental status intact. CN 2-12 intact and no focal sensory or motor deficits        Radiologic / Imaging / TESTING  4/18/22 XR Foot Left:  Impression   Interval fragmentation and osteopenia throughout the 1st metatarsal.  Absence   or bony destruction of the proximal phalanx 1st digit with diffuse soft   tissue swelling.  Additional bony destruction in the proximal phalanx.    Overall findings concerning for acute osteomyelitis.  Correlate for any   interval surgery.       Bony destruction of the distal tuft, distal phalanx 2nd digit concerning for   acute osteomyelitis.  Questionable osteomyelitis in the 2nd metatarsal head.      4/18/22 XR Foot Right:  Impression   Chest No acute periostitis or bony destruction.  Linear ulcer adjacent to the   1st metatarsal remnant.      4/18/22 VL Dup Lower Extremity Venous Left:  Impression   1. Occlusive thrombus in 1 of the peroneal veins. 2. Similar inguinal lymphadenopathy since at least 2021.      4/21/22 MRI Foot Left W WO Contrast:  Impression   Acute osteomyelitis involving the entire 1st metatarsal as well as the 1st   digit phalanges, with associated osseous erosions and pathologic fractures.    There is a soft tissue abscess centered at the level of the 1st metatarsal   head measuring approximately 4.5 x 3.4 x 3.7 cm.  Smaller suspected abscess   is seen at the level of the 1st IP joint.       Bone marrow edema is seen in the 2nd-4th metatarsals, all the cuneiform, and   2nd proximal and distal phalanges.  There are associated areas of T1 marrow   replacement.  Acute osteomyelitis should be suspected.       Cellulitis.       Prior amputation of the 3rd digit phalanges and a portion of the 3rd distal   metatarsal.      4/18/22 Complete Echo:  Summary   Left ventricular systolic function is normal.   Ejection fraction is visually estimated at 55%.   No significant valvular disease noted.   No evidence of any pericardial effusion       Labs:    Recent Results (from the past 24 hour(s))   POCT Glucose    Collection Time: 04/24/22  4:40 PM   Result Value Ref Range    POC Glucose 116 (H) 70 - 99 MG/DL   POCT Glucose    Collection Time: 04/24/22  5:36 PM   Result Value Ref Range    POC Glucose 116 (H) 70 - 99 MG/DL   POCT Glucose    Collection Time: 04/24/22  8:22 PM   Result Value Ref Range    POC Glucose 120 (H) 70 - 99 MG/DL   POCT Glucose    Collection Time: 04/25/22  2:20 AM   Result Value Ref Range    POC Glucose 143 (H) 70 - 99 MG/DL   POCT Glucose    Collection Time: 04/25/22  8:15 AM   Result Value Ref Range    POC Glucose 146 (H) 70 - 99 MG/DL   Renal Function Panel    Collection Time: 04/25/22 10:20 AM   Result Value Ref Range    Sodium 137 135 - 145 MMOL/L    Potassium 3.8 3.5 - 5.1 MMOL/L    Chloride 100 99 - 110 mMol/L    CO2 26 21 - 32 MMOL/L    Anion Gap 11 4 - 16    BUN 6 6 - 23 MG/DL    CREATININE 0.7 (L) 0.9 - 1.3 MG/DL    Glucose 129 (H) 70 - 99 MG/DL    Calcium 8.5 8.3 - 10.6 MG/DL    GFR Non-African American >60 >60 mL/min/1.73m2    GFR African American >60 >60 mL/min/1.73m2    Albumin 3.4 3.4 - 5.0 GM/DL    Phosphorus 2.6 2.5 - 4.9 MG/DL   CBC with Auto Differential    Collection Time: 04/25/22 10:20 AM   Result Value Ref Range    WBC 8.1 4.0 - 10.5 K/CU MM    RBC 3.56 (L) 4.6 - 6.2 M/CU MM    Hemoglobin 10.2 (L) 13.5 - 18.0 GM/DL    Hematocrit 33.1 (L) 42 - 52 %    MCV 93.0 78 - 100 FL    MCH 28.7 27 - 31 PG    MCHC 30.8 (L) 32.0 - 36.0 %    RDW 13.4 11.7 - 14.9 %    Platelets 567 (H) 144 - 440 K/CU MM    MPV 8.2 7.5 - 11.1 FL    Differential Type AUTOMATED DIFFERENTIAL     Segs Relative 72.9 (H) 36 - 66 %    Lymphocytes % 18.6 (L) 24 - 44 %    Monocytes % 6.3 (H) 0 - 4 %    Eosinophils % 1.2 0 - 3 %    Basophils % 0.4 0 - 1 %    Segs Absolute 5.9 K/CU MM    Lymphocytes Absolute 1.5 K/CU MM    Monocytes Absolute 0.5 K/CU MM    Eosinophils Absolute 0.1 K/CU MM    Basophils Absolute 0.0 K/CU MM    Nucleated RBC % 0.0 %    Total Nucleated RBC 0.0 K/CU MM    Total Immature Neutrophil 0.05 K/CU MM    Immature Neutrophil % 0.6 (H) 0 - 0.43 %   POCT Glucose    Collection Time: 04/25/22 12:39 PM   Result Value Ref Range    POC Glucose 108 (H) 70 - 99 MG/DL     CULTURE results: Invalid input(s): BLOOD CULTURE,  URINE CULTURE, SURGICAL CULTURE    Diagnosis:  Patient Active Problem List   Diagnosis    Unstable angina (HCC)    Lumbar back pain with radiculopathy affecting left lower extremity    S/P lumbar laminectomy    Type 2 diabetes mellitus with diabetic neuropathy, without long-term current use of insulin (HCC)    Essential hypertension    Gastroesophageal reflux disease without esophagitis    WD-Cellulitis of foot right    Chest pain    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)    WD-Diabetic ulcer of toe of left foot associated with type 2 diabetes mellitus, with necrosis of muscle ([address]    WD-Diabetic ulcer of right midfoot associated with type 2 diabetes mellitus, with fat layer exposed ([address]    Diabetic foot (HCC)    Moderate nonproliferative diabetic retinopathy of both eyes without macular edema associated with type 2 diabetes mellitus (HCC)    Acute osteomyelitis ([address]    Acute osteomyelitis of foot ([address]    Abscess of left foot    Cellulitis of left foot    Subacute osteomyelitis of left foot ([address]       Active Problems  Principal Problem:    Acute osteomyelitis ([address]  Active Problems:    Abscess of left foot    Cellulitis of left foot    Subacute osteomyelitis of left foot (HCC)  Resolved Problems:    * No resolved hospital problems. *    Electronically signed by: Electronically signed by Yasmin Ashley.  RAFAEL Fontanez CNP on 4/25/2022 at 2:35 PM

## 2022-04-25 NOTE — CONSULTS
[facility]  consulted by KAMI Alberto for monitoring and adjustment    Indication for treatment: Left DFU with OM and abscess, R Plantar DFU    Goal trough: [] 10-15 mcg/mL or [x] 15-20 mcg/ml  AUC/LIDIA: [] <500 or [] 400-600    Pertinent Laboratory Values:   Temp Readings from Last 3 Encounters:   04/25/22 97.7 °F (36.5 °C) (Oral)   11/04/21 98.1 °F (36.7 °C) (Temporal)   08/26/21 97.3 °F (36.3 °C) (Temporal)     Recent Labs     04/24/22  1311 04/25/22  1020   WBC 9.7 8.1     Recent Labs     04/24/22  1311 04/25/22  1020   BUN 4* 6   CREATININE 0.6* 0.7*     Estimated Creatinine Clearance: 152 mL/min (A) (based on SCr of 0.7 mg/dL (L)). Intake/Output Summary (Last 24 hours) at 4/25/2022 1606  Last data filed at 4/25/2022 0657  Gross per 24 hour   Intake 5298 ml   Output 800 ml   Net 4498 ml     Vancomycin level:   TROUGH:    Recent Labs     04/24/22  1311   VANCOTROUGH 13.7     RANDOM:    No results for input(s): VANCORANDOM in the last 72 hours.     Assessment:  · SCr, BUN, and urine output:  · Scr trends are stable, WNL  · Continue to monitor closely with hx DM-II  · Day(s) of therapy: 8  · WBC WNL  · S/p amputation of L great toe (4/23/22)  · Vancomycin concentration:   · 4/20:  13.6, 10h post-dose, , therapeutic (1250 q12h)  · 4/21: 17, 5.5h post-dose, , therapeutic (1250 q12h)  · 4/24: 13.7, 10h post-dose, , therapeutic (1250 q12h)    Plan:  · Continue vancomycin 1250mg IV every 12 hours with a therapeutic level and AUC  · Predicted trough: 12,   · Repeat next level in 3-5 days  · ID notes patient will require 6 weeks of therapy if residual osteomyelitis present following amputation  · Pharmacy will continue to monitor patient and adjust therapy as indicated    Abram 3 4/28 @ 1300    Thank you for the consult,  Willian Loja, [facility], PharmD  4/25/2022 4:06 PM

## 2022-04-25 NOTE — PLAN OF CARE
Nutrition Problem #1: Increased nutrient needs  Intervention: Food and/or Nutrient Delivery: Continue Current Diet,Start Oral Nutrition Supplement

## 2022-04-25 NOTE — PROGRESS NOTES
POD 2 Ray amputation of the left great toe secondary to multifocal OM. No acute events overnight. Pt complaining of a severe HA. AF VSS. Pt reports no pain in the left foot. BGL's 120-146  Left foot drsg c/d/i. Change dressing today and apply NPWT. Discussed with WCN. Plan for this upon discharge. He will see me at the [facility]. I did mention to him to consider HBOT. I did send bone fragments for cx, NGTD, prelim. Continue with IV abx's. Ok to bear some weight on left foot with postop shoe.   On eliquis for left peroneal DVT.    CPM.  HLIV

## 2022-04-25 NOTE — FLOWSHEET NOTE
Patient was being assisted up to Bedside commode and did not place any weight onto foot but left foot started bleeding profusely. Placed in bed and pressure held to wound bed. Charge nurse notified and Dr Tia Moe also notified and stated to take off wound vac but leave black sponge material and fluff up kerlix and use ABD pads and wrap to help control bleeding. Patient made comfortable and explained that since he is on Eliquis this is one of the things that could happen. Patient stated he understood.

## 2022-04-25 NOTE — CARE COORDINATION
Spoke with patient's nurse Alejandro Limon. She confirmed that patient had a wound VAC placed today and that CM will need to order one . Phoned Kellie at [facility] to order [facility] and faxed orders . Need to fax wound measurements once note is in.

## 2022-04-25 NOTE — CONSULTS
[facility] [facility] Continence Nurse  Consult Note       Elizabeth Champagne  AGE: 39 y.o. GENDER: male  : 1980  TODAY'S DATE:  2022    Subjective:     Reason for  Evaluation and Assessment: NPWT dressing to left foot amp site.        Elizabeth Champagne is a 39 y.o. male referred by:   [x] Physician  [] Nursing  [] Other:     Wound Identification:  Wound Type: diabetic, pressure and non-healing surgical  Contributing Factors: diabetes and chronic pressure        PAST MEDICAL HISTORY        Diagnosis Date    Abscess of left foot 2022    Callus of foot     Right foot - see's Dr. Gerard Puckett Cellulitis of left foot 2022    Diabetic ulcer of left midfoot associated with type 2 diabetes mellitus, with necrosis of muscle ([address] 2021    Diabetic ulcer of right midfoot associated with type 2 diabetes mellitus, with fat layer exposed ([address] 2020    Dizziness     positional    Essential hypertension     Follows with PCP    Hyperlipidemia     Lumbar radiculopathy     Septic embolism ([address] 2020    Subacute osteomyelitis of left foot ([address] 2022       PAST SURGICAL HISTORY    Past Surgical History:   Procedure Laterality Date    ACHILLES TENDON SURGERY Right 2021    RIGHT ACHILLES TENDON LENGTHENING REPAIR performed by Eder Mckenzie DPM at [facility]  2018    [address] [place] [address] 51    DENTAL SURGERY      teeth extractions -half per patient    HERNIA REPAIR Bilateral 2020    BIALTERAL HERNIA INGUINAL REPAIR performed by Maria Del Carmen Trujillo MD at [place] [address]      [place] OFFICE/OUTPT VISIT,PROCEDURE ONLY Left 2018    L5-S1 HEMILAMINECTOMY, REMOVAL OF DISC LEFT SIDE performed by David Steve MD at [place] [address] Right 2021    RIGHT TRANSMETATARSAL TOE AMPUTATION performed by Eder Mckenzie DPM at [place] [address] Left 2022    LEFT RAY GREAT TOE AMPUTATION performed by Vianey Scales MD at [address] EXTRACTION         FAMILY HISTORY    Family History   Problem Relation Age of Onset    Heart Disease Father     Diabetes Father     Diabetes Mother     Heart Disease Mother     Other Mother     Kidney Disease Mother     Heart Attack Mother        SOCIAL HISTORY    Social History     Tobacco Use    Smoking status: Never Smoker    Smokeless tobacco: Never Used   Vaping Use    Vaping Use: Never used   Substance Use Topics    Alcohol use: Yes     Comment: \"couple beers a night\"    Drug use: No       ALLERGIES    No Known Allergies    MEDICATIONS    No current facility-administered medications on file prior to encounter. Current Outpatient Medications on File Prior to Encounter   Medication Sig Dispense Refill    losartan (COZAAR) 25 MG tablet Take 1 tablet by mouth daily 30 tablet 5    FLUoxetine (PROZAC) 20 MG capsule Take 2 capsules by mouth daily 60 capsule 5    pioglitazone (ACTOS) 30 MG tablet Take 1 tablet by mouth daily 90 tablet 1    metoprolol succinate (TOPROL XL) 25 MG extended release tablet Take 1 tablet by mouth daily 90 tablet 1    omeprazole (PRILOSEC) 20 MG delayed release capsule Take 1 capsule by mouth once daily in the morning 90 capsule 1    metFORMIN (GLUCOPHAGE) 500 MG tablet Take 2 tablets by mouth 2 times daily (with meals) Indications: Start tomorrow 03/14 360 tablet 1    atorvastatin (LIPITOR) 40 MG tablet Take 1 tablet by mouth nightly 90 tablet 1    glyBURIDE (DIABETA) 5 MG tablet Take 2 tablets by mouth 2 times daily (with meals) 360 tablet 1    escitalopram (LEXAPRO) 10 MG tablet Take 1 tablet by mouth daily 30 tablet 5    hydroCHLOROthiazide (MICROZIDE) 12.5 MG capsule Take 1 capsule by mouth daily 30 capsule 3    blood glucose monitor kit and supplies Dispense sufficient amount for indicated testing frequency plus additional to accommodate PRN testing needs.  Dispense all needed supplies to include: monitor, strips, lancing device, lancets, control solutions, alcohol swabs. 1 kit 0    blood glucose monitor strips Test 2 times a day & as needed for symptoms of irregular blood glucose. Dispense sufficient amount for indicated testing frequency plus additional to accommodate PRN testing needs.  100 strip 0    FreeStyle Lancets MISC 1 each by Does not apply route daily 100 each 3    aspirin 81 MG tablet Take 81 mg by mouth daily      acetaminophen (TYLENOL) 325 MG tablet Take 2 tablets by mouth every 4 hours as needed for Pain or Fever 120 tablet 3         Objective:      BP (!) 150/92   Pulse 71   Temp 97.7 °F (36.5 °C) (Oral)   Resp 16   Ht 6' 0.01\" (1.829 m)   Wt 190 lb 14.7 oz (86.6 kg)   SpO2 96%   BMI 23.05 kg/m²   Heraclio Risk Score: Heraclio Scale Score: 22    LABS    CBC:   Lab Results   Component Value Date    WBC 8.1 04/25/2022    RBC 3.56 04/25/2022    HGB 10.2 04/25/2022    HCT 33.1 04/25/2022    MCV 93.0 04/25/2022    MCH 28.7 04/25/2022    MCHC 30.8 04/25/2022    RDW 13.4 04/25/2022     04/25/2022    MPV 8.2 04/25/2022     CMP:    Lab Results   Component Value Date     04/24/2022    K 4.1 04/24/2022    K 3.8 03/14/2018     04/24/2022    CO2 26 04/24/2022    BUN 4 04/24/2022    CREATININE 0.6 04/24/2022    GFRAA >60 04/24/2022    AGRATIO 1.4 03/12/2020    LABGLOM >60 04/24/2022    GLUCOSE 68 04/24/2022    PROT 7.1 04/19/2022    PROT 7.1 04/19/2022    LABALBU 2.5 04/19/2022    LABALBU 2.5 04/19/2022    CALCIUM 8.2 04/24/2022    BILITOT 0.4 04/19/2022    BILITOT 0.4 04/19/2022    ALKPHOS 78 04/19/2022    ALKPHOS 78 04/19/2022    AST 9 04/19/2022    AST 9 04/19/2022    ALT 6 04/19/2022    ALT 6 04/19/2022     Albumin:    Lab Results   Component Value Date    LABALBU 2.5 04/19/2022    LABALBU 2.5 04/19/2022     PT/INR:    Lab Results   Component Value Date    PROTIME 14.7 04/19/2022    INR 1.14 04/19/2022     HgBA1c:    Lab Results   Component Value Date    LABA1C 10.4 04/18/2022         Assessment:     Patient Active Problem List   Diagnosis    Unstable angina (HCC)    Lumbar back pain with radiculopathy affecting left lower extremity    S/P lumbar laminectomy    Type 2 diabetes mellitus with diabetic neuropathy, without long-term current use of insulin (HCC)    Essential hypertension    Gastroesophageal reflux disease without esophagitis    WD-Cellulitis of foot right    Chest pain    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 ([address]    WD-Diabetic ulcer of toe of left foot associated with type 2 diabetes mellitus, with necrosis of muscle ([address]    WD-Diabetic ulcer of right midfoot associated with type 2 diabetes mellitus, with fat layer exposed ([address]    Diabetic foot ([address]    Moderate nonproliferative diabetic retinopathy of both eyes without macular edema associated with type 2 diabetes mellitus (HCC)    Acute osteomyelitis (HCC)    Acute osteomyelitis of foot ([address]    Abscess of left foot    Cellulitis of left foot    Subacute osteomyelitis of left foot ([address]       Measurements:  Negative Pressure Wound Therapy Foot Left (Active)   Wound Type Surgical 04/25/22 0915   Unit Type kci 04/25/22 0915   Dressing Type Black Foam;White Foam 04/25/22 0915   Cycle Continuous 04/25/22 0915   Target Pressure (mmHg) 125 04/25/22 0915   Canister changed?  Yes 04/25/22 0915   Dressing Status New dressing applied 04/25/22 0915   Dressing Changed Changed/New 04/25/22 0915   Drainage Amount Large 04/25/22 0915   Drainage Description Sanguinous 04/25/22 0915   Dressing Change Due 04/27/22 04/25/22 0915   Wound Assessment Red;Pink 04/25/22 0915   Odor None 04/25/22 0915   Number of days: 0       Incision 04/17/18 Back (Active)   Number of days: 7893       Wound 06/13/20 Tibial Right pt states reddened and rash like after a sunburn 6/11/2020 (Active)   Number of days: 680       Wound 04/18/22 Foot Right;Plantar (Active)   Wound Image   04/19/22 0900   Wound Etiology Diabetic 04/25/22 0400   Dressing Status Other (Comment) 04/25/22 0400   Wound Cleansed Cleansed with saline 04/23/22 2000   Dressing/Treatment Foam 04/23/22 2000   Wound Length (cm) 1.4 cm 04/19/22 0900   Wound Width (cm) 0.5 cm 04/19/22 0900   Wound Depth (cm) 0.5 cm 04/19/22 0900   Wound Surface Area (cm^2) 0.7 cm^2 04/19/22 0900   Wound Volume (cm^3) 0.35 cm^3 04/19/22 0900   Distance Tunneling (cm) 0 cm 04/19/22 0900   Tunneling Position ___ O'Clock 0 04/19/22 0900   Undermining Starts ___ O'Clock 0 04/19/22 0900   Undermining Ends___ O'Clock 0 04/19/22 0900   Undermining Maxium Distance (cm) 0 04/19/22 0900   Wound Assessment Dry;Pink/red 04/25/22 0400   Drainage Amount None 04/25/22 0400   Drainage Description Serosanguinous 04/23/22 1430   Odor None 04/19/22 0900   Rosalie-wound Assessment Hyperkeratosis (callous); Dry/flaky; Warm 04/25/22 0400   Margins Defined edges 04/25/22 0400   Wound Thickness Description not for Pressure Injury Full thickness 04/19/22 0900   Number of days: 6       Wound 04/19/22 Foot Left;Plantar;Lateral cluster (Active)   Wound Image   04/19/22 0900   Wound Etiology Diabetic 04/25/22 0400   Dressing Status Clean;Dry; Intact; Other (Comment) 04/25/22 0400   Wound Cleansed Cleansed with saline 04/23/22 2000   Dressing/Treatment ABD; Alginate with Ag;Roll gauze 04/25/22 0400   Offloading for Diabetic Foot Ulcers Offloading not required 04/25/22 0400   Dressing Change Due 04/25/22 04/25/22 0400   Wound Length (cm) 6 cm 04/19/22 0900   Wound Width (cm) 12 cm 04/19/22 0900   Wound Depth (cm) 0.2 cm 04/19/22 0900   Wound Surface Area (cm^2) 72 cm^2 04/19/22 0900   Wound Volume (cm^3) 14.4 cm^3 04/19/22 0900   Distance Tunneling (cm) 0 cm 04/23/22 2000   Tunneling Position ___ O'Clock 0 04/23/22 2000   Undermining Starts ___ O'Clock 0 04/23/22 2000   Undermining Ends___ O'Clock 0 04/23/22 2000   Undermining Maxium Distance (cm) 0 04/23/22 2000   Wound Assessment Other (Comment) 04/25/22 0400   Drainage Amount None 04/25/22 0400   Drainage Description Purulent 04/23/22 2000   Odor None 04/25/22 0400   Rosalie-wound Assessment Edematous; Intact; Warm 04/25/22 0400   Margins Defined edges; Attached edges 04/23/22 2000   Wound Thickness Description not for Pressure Injury Full thickness 04/23/22 2000   Number of days: 6       Wound 04/25/22 Foot Left amp site (Active)   Wound Image   04/25/22 0915   Wound Etiology Non-Healing Surgical 04/25/22 0915   Dressing Status New dressing applied 04/25/22 0915   Wound Cleansed Cleansed with saline 04/25/22 0915   Dressing/Treatment Negative pressure wound therapy 04/25/22 0915   Wound Length (cm) 10.2 cm 04/25/22 0915   Wound Width (cm) 5.5 cm 04/25/22 0915   Wound Depth (cm) 2.5 cm 04/25/22 0915   Wound Surface Area (cm^2) 56.1 cm^2 04/25/22 0915   Wound Volume (cm^3) 140.25 cm^3 04/25/22 0915   Distance Tunneling (cm) 0 cm 04/25/22 0915   Tunneling Position ___ O'Clock 0 04/25/22 0915   Undermining Starts ___ O'Clock 0 04/25/22 0915   Undermining Ends___ O'Clock 0 04/25/22 0915   Undermining Maxium Distance (cm) 0 04/25/22 0915   Wound Assessment Pink/red;Bleeding 04/25/22 0915   Drainage Amount Large 04/25/22 0915   Drainage Description Sanguinous 04/25/22 0915   Odor None 04/25/22 0915   Rosalie-wound Assessment Intact 04/25/22 0915   Margins Defined edges 04/25/22 0915   Wound Thickness Description not for Pressure Injury Full thickness 04/25/22 0915   Number of days: 0       Response to treatment:  With complaints of pain.      Pain Assessment:  Severity:  mild  Quality of pain: sore/burning  Wound Pain Timing/Severity:  Wound care  Premedicated: yes    Plan:     Plan of Care: [REMOVED] Wound 04/18/22 Foot Left;Dorsal-Dressing/Treatment: Roll gauze,Alginate with Ag,ABD  Wound 04/18/22 Foot Right;Plantar-Dressing/Treatment: Foam  Wound 04/19/22 Foot Left;Plantar;Lateral cluster-Dressing/Treatment: ABD,Alginate with Ag,Roll gauze  Wound 04/25/22 Foot Left amp site-Dressing/Treatment: Negative pressure wound therapy (1 white piece, 1 black piece )       Patient in bed nurse in the room. Pt agreeable to wound care to place NPWT dressing to left foot amp site had ray amputation of left great toe on 4/23. Dressing removed. Soaked with NS. Measured and pictured. Pt had previous wound to dorsal foot now is amputation site. Removed previous hemostatic dressing material from surgery bone wax and fibrillar. Pt had bleeding used silver nitrate stick x 3 and pressure held for 20 minutes. Bleeding stopped. Prepped serenity wound as above, applied white foam x 1 piece and black foam x 1 piece with adequate seal obtained @ 125mmhg continuous. Wound care to change again on wed. Pt is generally not at risk for skin breakdown AEB tanya. Specialty Bed Required : no  [] Low Air Loss   [] Pressure Redistribution  [] Fluid Immersion  [] Bariatric  [] Total Pressure Relief  [] Other:     Discharge Plan:  Placement for patient upon discharge: tbd  Hospice Care: no  Patient appropriate for Outpatient [address]: yes with Dr Helene Robles     Patient/Caregiver Teaching:  Level of patient/caregiver understanding able to:   Pt voiced understanding. Electronically signed by Suman Hilton.  MEGAN Parra,  on 4/25/2022 at 12:30 PM

## 2022-04-25 NOTE — PROGRESS NOTES
Hospitalist Progress Note      PCP: Lyle Yanez MD    Date of Admission: 2022    Chief Complaint on Admission: left great toe infection    Pt Seen/Examined and Chart Reviewed. Admitting dx left great toe osteomyelitis    SUBJECTIVE:     Severe bilateral LE neuropathy, no LLE pain, wound vac in place, tolerating PO      OBJECTIVE:   Vitals    TEMPERATURE:  Current - Temp: 97.7 °F (36.5 °C); Max - Temp  Av.7 °F (36.5 °C)  Min: 97.7 °F (36.5 °C)  Max: 97.7 °F (36.5 °C)  RESPIRATIONS RANGE: Resp  Av.5  Min: 16  Max: 19  PULSE RANGE: Pulse  Av  Min: 71  Max: 71  BLOOD PRESSURE RANGE:  Systolic (03PQL), TJC:364 , Min:150 , XWD:266   ; Diastolic (23GVF), EMR:21, Min:89, Max:92    PULSE OXIMETRY RANGE: SpO2  Av %  Min: 96 %  Max: 98 %  24HR INTAKE/OUTPUT:    Intake/Output Summary (Last 24 hours) at 2022 1732  Last data filed at 2022 3419  Gross per 24 hour   Intake 5298 ml   Output 800 ml   Net 4498 ml       Exam:    General appearance: Well, no apparent distress, appears stated age and cooperative. HEENT Normal cephalic, atraumatic without obvious deformity. Pupils equal, round, and reactive to light. Extra ocular muscles intact. Conjunctivae/corneas clear. Neck: Supple, No jugular venous distention/bruits. Trachea midline without thyromegaly or adenopathy with full range of motion. Lungs: Clear to ascultation, bilaterally without Rales/Wheezes/Rhonchi with good respiratory effort. Heart: Regular rate and rhythm with Normal S1/S2 without  murmurs, rubs or gallops, point of maximum impulse non-displaced  Abdomen: Soft, non-tender or non-distended without rigidity or guarding and positive bowel sounds all four quadrants. Extremities: No clubbing, cyanosis,  C/D/I left foot with wound VAC intact with surrounding erythema and edema.  Right foot with plantar ulceration, no drainage or erythema noted around site.   Skin: Skin color, texture, turgor normal. No rashes or lesions. Neurologic: Alert and oriented X 3,  neurovascularly intact with sensory/motor intact upper extremities/lower extremities, bilaterally. Cranial nerves:II-XII intact, grossly non-focal.  Mental status: Alert, oriented, thought content appropriate. Data    Recent Labs     04/24/22  1311 04/25/22  1020   WBC 9.7 8.1   HGB 9.6* 10.2*   HCT 30.6* 33.1*   * 602*      Recent Labs     04/24/22  1311 04/25/22  1020    137   K 4.1 3.8    100   CO2 26 26   PHOS  --  2.6   BUN 4* 6   CREATININE 0.6* 0.7*     No results for input(s): AST, ALT, ALB, BILIDIR, BILITOT, ALKPHOS in the last 72 hours. No results for input(s): INR in the last 72 hours. No results for input(s): CKTOTAL, CKMB, CKMBINDEX, TROPONINI in the last 72 hours. Imaging/Test Results    Hb 9.6  plt 577  ESR 81          Consults:     IP CONSULT TO GENERAL SURGERY  IP CONSULT TO HOSPITALIST  IP CONSULT TO CARDIOLOGY  PHARMACY TO DOSE VANCOMYCIN  IP CONSULT TO DIETITIAN  IP CONSULT TO ENDOCRINOLOGY  IP CONSULT TO INFECTIOUS DISEASES    Allergies  Patient has no known allergies.     Medications      Scheduled Meds:   glipiZIDE  5 mg Oral QAM AC    metFORMIN  500 mg Oral BID WC    apixaban  10 mg Oral BID    Followed by   Destinee Hernandez ON 5/1/2022] apixaban  5 mg Oral BID    cefepime  2,000 mg IntraVENous Q8H    metroNIDAZOLE  500 mg IntraVENous Q8H    insulin glargine  25 Units SubCUTAneous Nightly    insulin lispro  0-6 Units SubCUTAneous 2 times per day    insulin lispro  0-12 Units SubCUTAneous TID WC    losartan  50 mg Oral Daily    aspirin  81 mg Oral Daily    escitalopram  10 mg Oral Daily    FLUoxetine  40 mg Oral Daily    metoprolol succinate  25 mg Oral Daily    pantoprazole  40 mg Oral QAM AC    atorvastatin  40 mg Oral Nightly    sodium chloride flush  5-40 mL IntraVENous 2 times per day    sennosides-docusate sodium  1 tablet Oral BID    vancomycin  1,250 mg IntraVENous Q12H       Infusions:   sodium chloride      dextrose         PRN Meds:  silver nitrate applicators, LORazepam, sodium chloride flush, sodium chloride, polyethylene glycol, acetaminophen **OR** acetaminophen, ondansetron **OR** ondansetron, glucose, dextrose, glucagon (rDNA), dextrose, HYDROcodone 5 mg - acetaminophen **OR** HYDROcodone 5 mg - acetaminophen    ASSESSMENT AND PLAN      Active Hospital Problems    Diagnosis Date Noted    Abscess of left foot [L02.612] 04/22/2022     Priority: Medium    Cellulitis of left foot [L03.116] 04/22/2022     Priority: Medium    Subacute osteomyelitis of left foot [facility]) [M86.272] 04/22/2022     Priority: Medium    Acute osteomyelitis [facility]) [M86.10] 04/18/2022       Plan:    ?  Left great toe's multifocal OM status post ray amputation of the great toe--afebrile, no leukocytosis, MRSA screen neg, left foot culture enterobacter cloacae, MRI left food with acute osteomyelitis and soft tissue abscess s/p amputation 4/23 with Dr. Padmini Mcmahon, cultures ngtd, histopath pending, ID consulted, wound vac placed,  will likely need PICC, continue IV cefepime, flagyl, vanc for now, follow up ID recs, Await 4/23 histopathology to evaluate margins for residual OM to determine duration of therapy. WBAT with postop shoe,         Left peroneal DVT: eliquis     · DMII, Uncontrolled:  ? Peripheral Neuropathy:  § Dr. Damaris Tapia onboard  § 4/18- HbAIC 10.4, endo following     ? HTN      ? HLD     ?      Bilateral inguinal hernia repair 2020, lumbar laminectomy 2018, right toe amputation 1/21. PT/OT Eval Status: ordered    DVT Prophylaxis: eliquis  Diet: ADULT DIET; Regular; 5 carb choices (75 gm/meal)  ADULT ORAL NUTRITION SUPPLEMENT; Lunch, Dinner;  Wound Healing Oral Supplement  ADULT ORAL NUTRITION SUPPLEMENT; Breakfast; Low Calorie/High Protein Oral Supplement  Code Status: Full Code      Dispo - f/u histopath and final ID recs, will likely need PICC line, HHC for IV antibiotics, wound vac, f/u wound clinic, eliquis, Lemmie Skiff, MD

## 2022-04-26 PROBLEM — E11.621 DIABETIC ULCER OF LEFT MIDFOOT ASSOCIATED WITH TYPE 2 DIABETES MELLITUS, WITH MUSCLE INVOLVEMENT WITHOUT EVIDENCE OF NECROSIS (HCC): Status: ACTIVE | Noted: 2022-04-26

## 2022-04-26 PROBLEM — L97.425 DIABETIC ULCER OF LEFT MIDFOOT ASSOCIATED WITH TYPE 2 DIABETES MELLITUS, WITH MUSCLE INVOLVEMENT WITHOUT EVIDENCE OF NECROSIS (HCC): Status: ACTIVE | Noted: 2022-04-26

## 2022-04-26 PROBLEM — D62 ANEMIA ASSOCIATED WITH ACUTE BLOOD LOSS: Status: ACTIVE | Noted: 2022-04-26

## 2022-04-26 LAB
ESTIMATED AVERAGE GLUCOSE: 252 MG/DL
FERRITIN: 659 NG/ML (ref 30–400)
FOLATE: 12.4 NG/ML (ref 3.1–17.5)
GLUCOSE BLD-MCNC: 153 MG/DL (ref 70–99)
GLUCOSE BLD-MCNC: 166 MG/DL (ref 70–99)
GLUCOSE BLD-MCNC: 214 MG/DL (ref 70–99)
GLUCOSE BLD-MCNC: 246 MG/DL (ref 70–99)
GLUCOSE BLD-MCNC: 88 MG/DL (ref 70–99)
HBA1C MFR BLD: 10.4 % (ref 4.2–6.3)
HCT VFR BLD CALC: 21 % (ref 42–52)
HCT VFR BLD CALC: 22.3 % (ref 42–52)
HCT VFR BLD CALC: 24.3 % (ref 42–52)
HEMOGLOBIN: 6.4 GM/DL (ref 13.5–18)
HEMOGLOBIN: 7 GM/DL (ref 13.5–18)
HEMOGLOBIN: 7.6 GM/DL (ref 13.5–18)
HIGH SENSITIVE C-REACTIVE PROTEIN: 6.5 MG/L
IRON: 44 UG/DL (ref 59–158)
PCT TRANSFERRIN: 25 % (ref 10–44)
TOTAL IRON BINDING CAPACITY: 175 UG/DL (ref 250–450)
UNSATURATED IRON BINDING CAPACITY: 131 UG/DL (ref 110–370)
VITAMIN B-12: 176.9 PG/ML (ref 211–911)

## 2022-04-26 PROCEDURE — 6370000000 HC RX 637 (ALT 250 FOR IP): Performed by: SURGERY

## 2022-04-26 PROCEDURE — 83036 HEMOGLOBIN GLYCOSYLATED A1C: CPT

## 2022-04-26 PROCEDURE — 6370000000 HC RX 637 (ALT 250 FOR IP): Performed by: INTERNAL MEDICINE

## 2022-04-26 PROCEDURE — 2500000003 HC RX 250 WO HCPCS: Performed by: SURGERY

## 2022-04-26 PROCEDURE — 86141 C-REACTIVE PROTEIN HS: CPT

## 2022-04-26 PROCEDURE — 83550 IRON BINDING TEST: CPT

## 2022-04-26 PROCEDURE — 2580000003 HC RX 258: Performed by: SURGERY

## 2022-04-26 PROCEDURE — C1751 CATH, INF, PER/CENT/MIDLINE: HCPCS

## 2022-04-26 PROCEDURE — 6360000002 HC RX W HCPCS: Performed by: SURGERY

## 2022-04-26 PROCEDURE — 85014 HEMATOCRIT: CPT

## 2022-04-26 PROCEDURE — 86900 BLOOD TYPING SEROLOGIC ABO: CPT

## 2022-04-26 PROCEDURE — 83540 ASSAY OF IRON: CPT

## 2022-04-26 PROCEDURE — 82746 ASSAY OF FOLIC ACID SERUM: CPT

## 2022-04-26 PROCEDURE — 86850 RBC ANTIBODY SCREEN: CPT

## 2022-04-26 PROCEDURE — 76937 US GUIDE VASCULAR ACCESS: CPT

## 2022-04-26 PROCEDURE — P9016 RBC LEUKOCYTES REDUCED: HCPCS

## 2022-04-26 PROCEDURE — 2580000003 HC RX 258: Performed by: NURSE PRACTITIONER

## 2022-04-26 PROCEDURE — 82607 VITAMIN B-12: CPT

## 2022-04-26 PROCEDURE — 86922 COMPATIBILITY TEST ANTIGLOB: CPT

## 2022-04-26 PROCEDURE — 02HV33Z INSERTION OF INFUSION DEVICE INTO SUPERIOR VENA CAVA, PERCUTANEOUS APPROACH: ICD-10-PCS | Performed by: INTERNAL MEDICINE

## 2022-04-26 PROCEDURE — 36569 INSJ PICC 5 YR+ W/O IMAGING: CPT

## 2022-04-26 PROCEDURE — 82962 GLUCOSE BLOOD TEST: CPT

## 2022-04-26 PROCEDURE — 36415 COLL VENOUS BLD VENIPUNCTURE: CPT

## 2022-04-26 PROCEDURE — 85018 HEMOGLOBIN: CPT

## 2022-04-26 PROCEDURE — 82728 ASSAY OF FERRITIN: CPT

## 2022-04-26 PROCEDURE — 94761 N-INVAS EAR/PLS OXIMETRY MLT: CPT

## 2022-04-26 PROCEDURE — 2060000000 HC ICU INTERMEDIATE R&B

## 2022-04-26 PROCEDURE — 36430 TRANSFUSION BLD/BLD COMPNT: CPT

## 2022-04-26 PROCEDURE — 99232 SBSQ HOSP IP/OBS MODERATE 35: CPT | Performed by: NURSE PRACTITIONER

## 2022-04-26 PROCEDURE — 86901 BLOOD TYPING SEROLOGIC RH(D): CPT

## 2022-04-26 RX ORDER — SODIUM CHLORIDE 9 MG/ML
INJECTION, SOLUTION INTRAVENOUS PRN
Status: DISCONTINUED | OUTPATIENT
Start: 2022-04-26 | End: 2022-04-27 | Stop reason: HOSPADM

## 2022-04-26 RX ORDER — LANOLIN ALCOHOL/MO/W.PET/CERES
2000 CREAM (GRAM) TOPICAL DAILY
Status: DISCONTINUED | OUTPATIENT
Start: 2022-04-26 | End: 2022-04-27 | Stop reason: HOSPADM

## 2022-04-26 RX ORDER — SODIUM CHLORIDE 0.9 % (FLUSH) 0.9 %
5-40 SYRINGE (ML) INJECTION EVERY 12 HOURS SCHEDULED
Status: DISCONTINUED | OUTPATIENT
Start: 2022-04-26 | End: 2022-04-27 | Stop reason: HOSPADM

## 2022-04-26 RX ORDER — LIDOCAINE HYDROCHLORIDE 10 MG/ML
5 INJECTION, SOLUTION EPIDURAL; INFILTRATION; INTRACAUDAL; PERINEURAL ONCE
Status: DISCONTINUED | OUTPATIENT
Start: 2022-04-26 | End: 2022-04-27 | Stop reason: HOSPADM

## 2022-04-26 RX ORDER — SODIUM CHLORIDE 0.9 % (FLUSH) 0.9 %
5-40 SYRINGE (ML) INJECTION PRN
Status: DISCONTINUED | OUTPATIENT
Start: 2022-04-26 | End: 2022-04-27 | Stop reason: HOSPADM

## 2022-04-26 RX ADMIN — CEFEPIME HYDROCHLORIDE 2000 MG: 2 INJECTION, POWDER, FOR SOLUTION INTRAVENOUS at 22:54

## 2022-04-26 RX ADMIN — CEFEPIME HYDROCHLORIDE 2000 MG: 2 INJECTION, POWDER, FOR SOLUTION INTRAVENOUS at 18:01

## 2022-04-26 RX ADMIN — VANCOMYCIN HYDROCHLORIDE 1250 MG: 10 INJECTION, POWDER, LYOPHILIZED, FOR SOLUTION INTRAVENOUS at 00:33

## 2022-04-26 RX ADMIN — CEFEPIME HYDROCHLORIDE 2000 MG: 2 INJECTION, POWDER, FOR SOLUTION INTRAVENOUS at 00:31

## 2022-04-26 RX ADMIN — METRONIDAZOLE 500 MG: 500 INJECTION, SOLUTION INTRAVENOUS at 09:04

## 2022-04-26 RX ADMIN — FLUOXETINE 40 MG: 10 CAPSULE ORAL at 09:01

## 2022-04-26 RX ADMIN — METRONIDAZOLE 500 MG: 500 INJECTION, SOLUTION INTRAVENOUS at 16:47

## 2022-04-26 RX ADMIN — SODIUM CHLORIDE, PRESERVATIVE FREE 10 ML: 5 INJECTION INTRAVENOUS at 09:01

## 2022-04-26 RX ADMIN — METOPROLOL SUCCINATE 25 MG: 25 TABLET, EXTENDED RELEASE ORAL at 09:01

## 2022-04-26 RX ADMIN — GLIPIZIDE 5 MG: 5 TABLET ORAL at 07:44

## 2022-04-26 RX ADMIN — LOSARTAN POTASSIUM 50 MG: 25 TABLET, FILM COATED ORAL at 09:01

## 2022-04-26 RX ADMIN — METRONIDAZOLE 500 MG: 500 INJECTION, SOLUTION INTRAVENOUS at 22:54

## 2022-04-26 RX ADMIN — METFORMIN HYDROCHLORIDE 500 MG: 500 TABLET ORAL at 16:52

## 2022-04-26 RX ADMIN — SODIUM CHLORIDE, PRESERVATIVE FREE 10 ML: 5 INJECTION INTRAVENOUS at 21:13

## 2022-04-26 RX ADMIN — CEFEPIME HYDROCHLORIDE 2000 MG: 2 INJECTION, POWDER, FOR SOLUTION INTRAVENOUS at 10:21

## 2022-04-26 RX ADMIN — INSULIN LISPRO 4 UNITS: 100 INJECTION, SOLUTION INTRAVENOUS; SUBCUTANEOUS at 13:16

## 2022-04-26 RX ADMIN — INSULIN LISPRO 2 UNITS: 100 INJECTION, SOLUTION INTRAVENOUS; SUBCUTANEOUS at 09:09

## 2022-04-26 RX ADMIN — HYDROCODONE BITARTRATE AND ACETAMINOPHEN 2 TABLET: 5; 325 TABLET ORAL at 21:15

## 2022-04-26 RX ADMIN — ATORVASTATIN CALCIUM 40 MG: 40 TABLET, FILM COATED ORAL at 21:13

## 2022-04-26 RX ADMIN — INSULIN LISPRO 2 UNITS: 100 INJECTION, SOLUTION INTRAVENOUS; SUBCUTANEOUS at 18:02

## 2022-04-26 RX ADMIN — VANCOMYCIN HYDROCHLORIDE 1250 MG: 10 INJECTION, POWDER, LYOPHILIZED, FOR SOLUTION INTRAVENOUS at 15:10

## 2022-04-26 RX ADMIN — INSULIN LISPRO 2 UNITS: 100 INJECTION, SOLUTION INTRAVENOUS; SUBCUTANEOUS at 02:50

## 2022-04-26 RX ADMIN — PANTOPRAZOLE SODIUM 40 MG: 40 TABLET, DELAYED RELEASE ORAL at 07:44

## 2022-04-26 RX ADMIN — ESCITALOPRAM OXALATE 10 MG: 10 TABLET ORAL at 09:01

## 2022-04-26 RX ADMIN — CYANOCOBALAMIN TAB 1000 MCG 2000 MCG: 1000 TAB at 16:52

## 2022-04-26 RX ADMIN — METFORMIN HYDROCHLORIDE 500 MG: 500 TABLET ORAL at 09:01

## 2022-04-26 RX ADMIN — HYDROCODONE BITARTRATE AND ACETAMINOPHEN 2 TABLET: 5; 325 TABLET ORAL at 00:09

## 2022-04-26 ASSESSMENT — PAIN SCALES - GENERAL
PAINLEVEL_OUTOF10: 8
PAINLEVEL_OUTOF10: 7
PAINLEVEL_OUTOF10: 5
PAINLEVEL_OUTOF10: 0
PAINLEVEL_OUTOF10: 0
PAINLEVEL_OUTOF10: 5
PAINLEVEL_OUTOF10: 8
PAINLEVEL_OUTOF10: 0

## 2022-04-26 ASSESSMENT — PAIN DESCRIPTION - ORIENTATION
ORIENTATION: LEFT
ORIENTATION: LEFT

## 2022-04-26 ASSESSMENT — PAIN DESCRIPTION - PAIN TYPE: TYPE: CHRONIC PAIN

## 2022-04-26 ASSESSMENT — PAIN DESCRIPTION - ONSET: ONSET: ON-GOING

## 2022-04-26 ASSESSMENT — PAIN DESCRIPTION - LOCATION
LOCATION: FOOT
LOCATION: FOOT

## 2022-04-26 ASSESSMENT — PAIN DESCRIPTION - DESCRIPTORS
DESCRIPTORS: THROBBING
DESCRIPTORS: ACHING

## 2022-04-26 ASSESSMENT — PAIN - FUNCTIONAL ASSESSMENT: PAIN_FUNCTIONAL_ASSESSMENT: PREVENTS OR INTERFERES SOME ACTIVE ACTIVITIES AND ADLS

## 2022-04-26 ASSESSMENT — PAIN DESCRIPTION - FREQUENCY: FREQUENCY: CONTINUOUS

## 2022-04-26 ASSESSMENT — PAIN SCALES - WONG BAKER: WONGBAKER_NUMERICALRESPONSE: 0

## 2022-04-26 NOTE — PROGRESS NOTES
POD 3 Ray amputation of the left great toe secondary to multifocal OM. We placed a wound VAC on the left foot and he had a moderate amount of bleeding. I stopped the eliquis, had the night nurse get surgicell and now it has stopped. His Hgb has dropped to 6.4 from 10.2. He has remained hemodynamically stable. Anemia due to acute blood loss from surgery and anticoagulation. Will transfuse 1 unit of PRBC's. Pt reports that his HA has also lessened. Pt reports no pain in the left foot. Left foot drsg is c/d/i.  Mulugeta Theo did mention to him to consider HBOT. I did send bone fragments for cx, NGTD, prelim.  Ok from a surgical standpoint to de-escalate abx's. Ok to bear some weight on left foot with postop shoe. Keep eliquis on hold for now due to bleeding for left peroneal DVT.    CPM.  HLIV. If he remains stable today after his blood transfusion he can likely be discharged tomorrow.

## 2022-04-26 NOTE — ADT AUTH CERT
Utilization Reviews         wound picture by Rudy Zayas RN       Review Status Review Entered   In Primary 4/26/2022 15:59      Criteria Review           Osteomyelitis - Care Day 8 (4/25/2022) by Rudy Zayas RN       Review Status Review Entered   Completed 4/26/2022 15:57      Criteria Review      Care Day: 8 Care Date: 4/25/2022 Level of Care: Intermediate Care    Guideline Day 3    Level Of Care    (X) Floor    4/26/2022 3:51 PM EDT by Denice Dubin      intermediate    Clinical Status    (X) * Hemodynamic stability    4/26/2022 3:51 PM EDT by Denice Dubin      121/99  96p    Activity    (X) As tolerated    4/26/2022 3:51 PM EDT by Denice Dubin      up with assistance    Routes    (X) Oral hydration    4/26/2022 3:57 PM EDT by Denice Dubin      tylenol 650mg prn q6hr PO-x1  norco 5-325mg q4hr prn PO 2 tab -x1    4/26/2022 3:51 PM EDT by Kellie Olivo      aspirin 81mg qd PO  lipitor 40mg qhs PO  lexapro 10mg qd PO   prozac 40mg qd PO   glucotrol 5mg qd PO  lantus 25U qhs SC  lispro qid sliding scale SC  cozaar 50mg qd PO  glucophage 500mg bid PO  toprol XL 25mg qd PO  protonix 40mg qd PO  senokot 1 tab bid PO    (X) Usual diet    4/26/2022 3:51 PM EDT by Kellie Olivo      low carb diet    Interventions    (X) Possible physical therapy    4/26/2022 3:57 PM EDT by Denice Dubin      PT consulted    (X) WBC, ESR, C-reactive protein    4/26/2022 3:57 PM EDT by Kellie Olivo      WBC 8.1    Medications    (X) Parenteral antibiotics    4/26/2022 3:51 PM EDT by Kellie Olivo      cefepime 2000mg q8hr IV  flagyl 500mg q8hr IV  vanco 1250mg bid IV    * Milestone   Additional Notes   DATE:    04/25      Pertinent Updates:   Wound vac, IV antibiotics      Vitals:   98.2 (36.8)14r, 94p. 104/65, 100% None (Room air)      Abnl/Pertinent Labs/Radiology/Diagnostic Studies:   Creatinine: 0.7 (L)   GLUCOSE, FASTING,GF: 129 (H)   RBC: 3.56 (L)   Hemoglobin Quant: 10.2 (L)   Hematocrit: 33.1 (L)   MCHC: 30.8 (L)   RDW: 13.4   Platelet Count: 534 (H) Lymphocyte %: 18.6 (L)   Monocytes %: 6.3 (H)   Segs Relative: 72.9 (H)   Immature Neutrophil %: 0.6 (H)         Physical Exam:   98.2 (13.4)[id number]-HWQP fowlers--100None (Room air)   General appearance: Well, no apparent distress, appears stated age and cooperative. HEENT Normal cephalic, atraumatic without obvious deformity. Pupils equal, round, and reactive to light.  Extra ocular muscles intact. Conjunctivae/corneas clear. Neck: Supple, No jugular venous distention/bruits. Trachea midline without thyromegaly or adenopathy with full range of motion. Lungs: Clear to ascultation, bilaterally without Rales/Wheezes/Rhonchi with good respiratory effort. Heart: Regular rate and rhythm with Normal S1/S2 without  murmurs, rubs or gallops, point of maximum impulse non-displaced   Abdomen: Soft, non-tender or non-distended without rigidity or guarding and positive bowel sounds all four quadrants. Extremities: No clubbing, cyanosis,  C/D/I left foot with wound VAC intact with surrounding erythema and edema. Right foot with plantar ulceration, no drainage or erythema noted around site. Skin: Skin color, texture, turgor normal. No rashes or lesions. Neurologic: Alert and oriented X 3,  neurovascularly intact with sensory/motor intact upper extremities/lower extremities, bilaterally. Cranial nerves:II-XII intact, grossly non-focal.   Mental status: Alert, oriented, thought content appropriate      MD Consults/Assessments & Plans:   ID   Continue IV vancomycin per pharmacy dosing   Continue IV cefepime 2 gm q8h   Continue  IV Flagyl 500 mg q8h    Trend CRP, ordered   Await 4/23 histopathology to evaluate margins for residual OM to determine duration of therapy.        Gen Surg   POD 2 Ray amputation of the left great toe secondary to multifocal OM. No acute events overnight. Pt complaining of a severe HA. AF VSS.  Pt reports no pain in the left foot.    BGL's 120-146   Left foot drsg c/d/i. Phan Manley

## 2022-04-26 NOTE — PROGRESS NOTES
Hospitalist Progress Note      Name:  Debbie Velazco /Age/Sex: 1980  (39 y.o. male)   MRN & CSN:  [id number] & [id number] Admission Date/Time: 2022 11:35 AM   Location:  -A PCP: Kody Delvalle MD         Hospital Day: 9  Discharge barrier/Reason for continued hospitalization: Await final sensitivities. Transfuse blood today. Assessment and Plan:   Debbie Velazco is a 39 y.o.  male with a past medical history of type 2 diabetes, dyslipidemia, hypertension, peroneal thrombus, chronic back pain s/p laminectomy, diabetic foot infection s/p right amputation 2021 who presented to the ED on 2022 with concern for left foot stump infection and was diagnosed with Acute osteomyelitis [facility])  Since hospitalization, patient underwent left great toe ray amputation on 2022. He remains on broad-spectrum IV antibiotics while waiting final sensitivities from operative cultures. I do not see any acute organ dysfunction to suggest sepsis. 1.  Left great toe osteomyelitis and first metatarsal head: S/p ray amputation of the left great toe 2022. Appreciate general surgery  IV antibiotics per ID-vancomycin, Flagyl and cefepime  Per surgery, patient will benefit from hyperbaric oxygen    2. Poorly controlled diabetes: A1c 10.4 on 2020  Diabetes management per endocrinologist-insulin and OHA's. Lantus 25 units nightly, moderate dose insulin correctional scale  Glipizide daily, metformin twice daily. 3.  Acute blood loss anemia: Likely operative in the context of chronic anticoagulation  Hgb down to 6.4 today, transfuse 1 unit PRBC  Recheck Hgb in a.m.    4.  Vitamin B12 deficiency: Levels down to 176. Vitamin B12 replacement    5. Hypertension: Continue Cozaar daily, Toprol-XL daily. 6.  Dyslipidemia: Continue Lipitor daily  7. Depression: Continue Lexapro, Prozac daily. 8.  Peroneal thrombus: POA. Lovenox prior to surgery. Diet ADULT DIET;  Regular; 5 carb choices (75 gm/meal)  ADULT ORAL NUTRITION SUPPLEMENT; Lunch, Dinner; Wound Healing Oral Supplement  ADULT ORAL NUTRITION SUPPLEMENT; Breakfast; Low Calorie/High Protein Oral Supplement   DVT Prophylaxis [] Lovenox, []  Heparin, [] SCDs, [] Ambulation   GI Prophylaxis [] PPI,  [] H2 Blocker,  [] Carafate,  [] Diet/Tube Feeds   Code Status Full Code   MDM [] Low, [] Moderate,[]  High       History of Present Illness:     Chief Complaint: Acute osteomyelitis ([address]  Jasson Hull is a 39 y.o.  male  who presents with concern for infection of the left foot. Patient ruled in for acute osteomyelitis. Underwent surgery on 4/23/2022.  4/26/2022: Patient is seen and examined, has no new complaints. Looking forward to discharge home soon. Significant drop in hemoglobin post op. Ten point ROS reviewed, negative, unless as noted above    Objective: Intake/Output Summary (Last 24 hours) at 4/26/2022 1217  Last data filed at 4/26/2022 0901  Gross per 24 hour   Intake 10 ml   Output --   Net 10 ml        Vitals:   Vitals:    04/26/22 0859 04/26/22 0956 04/26/22 1158 04/26/22 1200   BP: 124/78  124/78 117/71   Pulse: 85  92 91   Resp: 19  14 14   Temp: 97.9 °F (36.6 °C)  97.9 °F (36.6 °C)    TempSrc: Oral      SpO2: 100% 100% 100% 100%   Weight:       Height:            Physical Exam:   GEN: Awake male, alert and oriented x3 in no apparent distress. Appears given age. HEENT: Normal.  RESP: Clear lung fields bilaterally. Symmetric chest movement while on RA  CVS: RRR, S1, S2  GI/: Abdomen is soft, nontender, no organomegaly. . Bowel sounds normal, rectal exam deferred. No CVA tenderness. MSK: No gross joint deformities. No tenderness. Left foot stump in dressing. SKIN: Normal coloration, warm, dry. Bilateral upper extremity tattoos. NEURO: Cranial nerves appear grossly intact, normal speech, no lateralizing weakness.   PSYCH:  Affect appropriate    Medications:   Medications:    lidocaine PF  5 mL IntraDERmal Once    sodium chloride flush  5-40 mL IntraVENous 2 times per day    glipiZIDE  5 mg Oral QAM AC    metFORMIN  500 mg Oral BID WC    cefepime  2,000 mg IntraVENous Q8H    metroNIDAZOLE  500 mg IntraVENous Q8H    insulin glargine  25 Units SubCUTAneous Nightly    insulin lispro  0-6 Units SubCUTAneous 2 times per day    insulin lispro  0-12 Units SubCUTAneous TID WC    losartan  50 mg Oral Daily    escitalopram  10 mg Oral Daily    FLUoxetine  40 mg Oral Daily    metoprolol succinate  25 mg Oral Daily    pantoprazole  40 mg Oral QAM AC    atorvastatin  40 mg Oral Nightly    sodium chloride flush  5-40 mL IntraVENous 2 times per day    sennosides-docusate sodium  1 tablet Oral BID    vancomycin  1,250 mg IntraVENous Q12H      Infusions:    sodium chloride      sodium chloride      sodium chloride      sodium chloride      dextrose       PRN Meds: sodium chloride, , PRN  sodium chloride, , PRN  sodium chloride flush, 5-40 mL, PRN  sodium chloride, , PRN  silver nitrate applicators, 1 each, PRN  oxidized cellulose, 1 each, PRN  LORazepam, 0.5 mg, Q4H PRN  sodium chloride flush, 5-40 mL, PRN  sodium chloride, , PRN  polyethylene glycol, 17 g, Daily PRN  acetaminophen, 650 mg, Q6H PRN   Or  acetaminophen, 650 mg, Q6H PRN  ondansetron, 4 mg, Q8H PRN   Or  ondansetron, 4 mg, Q6H PRN  glucose, 4 tablet, PRN  dextrose, 12.5 g, PRN  glucagon (rDNA), 1 mg, PRN  dextrose, 100 mL/hr, PRN  HYDROcodone 5 mg - acetaminophen, 1 tablet, Q4H PRN   Or  HYDROcodone 5 mg - acetaminophen, 2 tablet, Q4H PRN          Electronically signed by Shawn Dang MD on 4/26/2022 at 12:17 PM

## 2022-04-26 NOTE — CONSULTS
[address] [facility]  consulted by KAMI Cross for monitoring and adjustment    Indication for treatment: Left DFU with OM and abscess, R Plantar DFU    Goal trough: [] 10-15 mcg/mL or [x] 15-20 mcg/ml  AUC/LIDIA: [] <500 or [] 400-600    Pertinent Laboratory Values:   Temp Readings from Last 3 Encounters:   04/26/22 97.9 °F (36.6 °C)   11/04/21 98.1 °F (36.7 °C) (Temporal)   08/26/21 97.3 °F (36.3 °C) (Temporal)     Recent Labs     04/24/22  1311 04/25/22  1020   WBC 9.7 8.1     Recent Labs     04/24/22  1311 04/25/22  1020   BUN 4* 6   CREATININE 0.6* 0.7*     Estimated Creatinine Clearance: 152 mL/min (A) (based on SCr of 0.7 mg/dL (L)). Intake/Output Summary (Last 24 hours) at 4/26/2022 1636  Last data filed at 4/26/2022 1420  Gross per 24 hour   Intake 305.83 ml   Output --   Net 305.83 ml     Vancomycin level:   TROUGH:    Recent Labs     04/24/22  1311   VANCOTROUGH 13.7     RANDOM:    No results for input(s): VANCORANDOM in the last 72 hours.     Assessment:  · SCr, BUN, and urine output:  · Scr trends previously stable, WNL  · Continue to monitor closely with hx DM-II  · Day(s) of therapy: 9  · WBC WNL  · S/p amputation of L great toe (4/23/22)  · Vancomycin concentration:   · 4/20:  13.6, 10h post-dose, , therapeutic (1250 q12h)  · 4/21: 17, 5.5h post-dose, , therapeutic (1250 q12h)  · 4/24: 13.7, 10h post-dose, , therapeutic (1250 q12h)    Plan:  · Continue vancomycin 1250mg IV every 12 hours with a therapeutic level and AUC  · Predicted trough: 12,   · Repeat next level in 2 days  · ID notes patient will require 6 weeks of therapy if residual osteomyelitis present following amputation  · Pharmacy will continue to monitor patient and adjust therapy as indicated    Abram 3 4/28 @ 1300    Thank you for the consult,  Arnol HERRERA [facility] [place], PharmD  4/26/2022 4:36 PM

## 2022-04-26 NOTE — PROGRESS NOTES
Angelina Hopkins NP regarding pt drop in hgb. Orders recv'd for type and screen entered and will await lab results for 0600 redraw. Patient is not hypotensive or symptomatic at this time. Will update if patient condition changes. Will continue to monitor.

## 2022-04-26 NOTE — CARE COORDINATION
Received VM from Kellie at [facility] . She was requesting for CM to provide patient's nutrition assessment. Spoke with Kellie . She stated ok to fax nutrition assessment from Dietitian .  Nutrition assessment faxed

## 2022-04-26 NOTE — PROGRESS NOTES
Infectious Disease Progress Note  2022   Patient Name: Darian Salazar : 1980   Impression  · Left DFU with OM:     ? Right Plantar DFU:     ? Acute Thrombus of the Left Peroneal Vein:     § Afebrile, no leukocytosis, Pct and CRP on DWT, did have a drop in Hb to 6.4, suspected blood loss from wound vac. Dr. Jessica Pollard this am.   § -BC 0/2-NGTD  § -MRSA/MSSA screen negative  § -Left Foot ulcer culture: Enterobacter cloacae  § -XR Foot Right: no acute periostitis or bony destruction. Linear ulcer adjacent to the 1st metatarsal remnant. § -VL Dup LLE: occlusive thrombus in 1 of the peroneal veins. Small inguinal lymphadenopathy since at least . § -Left Foot XR: imp of questionable OM  § -MRI Foot Left W WO Contrast: Acute OM involving the entire 1st metatarsal as well as the 1st digit phalanges, with associated osseous erosions and pathologic fractures. There is a soft tissue abscess centered at the level of the 1st metatarsal head measuring approx 4.5 x 3.4 x 3.7 cm. Smaller suspected abscess is seen at the level of the 1st IP joint. Bone marrow edema is seen in the 2nd-4th metatarsals, all the cuneform, and 2nd proximal and distal phalanges. There are associated areas of T1 marrow replacement. Acute OM should be suspected. Cellulitis. Prior amp of the 3rd digit phalanges and a portion of the 3rd distal metatarsal.  § Dr. Veda Park, cardiology, onboard to manage DVT. Rec Lovenox and transition to NOAC. § -Complete Echo: EF 55%, no mention of RV strain. § -s/p per Dr. Tia Moe, bedside debridement of left foot  § -S/p per Dr. Tia Moe: ray amputation of left great toe. DX:  Multifocal OM of left great toe and first metatarsal head. Cultures: Staphylococcus lugdunensis, Streptococcus anginosus.     § -Surgical Histopathology: Pending     · DMII, Uncontrolled:  ? Peripheral Neuropathy:  § Dr. Renny Boone onboard  § - HbAIC 10.4     ? HTN    ? HLD     ? Lumbar Radiculopathy     ? H/o Septic Embolism 6/20     · Multi-morbidity: per PMHx:  Bilateral inguinal hernia repair 2020, lumbar laminectomy 2018, right toe amputation 1/21.     Plan:  · Continue IV vancomycin per pharmacy dosing  ? Continue IV cefepime 2 gm q8h  ? Continue  IV Flagyl 500 mg q8h   ? Await for surgical culture sensitivities to adjust ABX for final rec  ? Ordered PICC line   · Trend CRP, on DWT   ? Await 4/23 histopathology to evaluate margins for residual OM to determine duration of therapy. Ongoing Antimicrobial Therapy  Cefepime 4/22-  Flagyl 4/22-  Vancomycin 4/18-? Completed Antimicrobial Therapy  Zosyn 4/18-22?  ? History:? Interval history noted. Chief complaint: Left DFU with OM. Denies n/v/d/f or untoward effects of antibiotics. States he lost a lot of blood from his left foot wound last night, awaiting packed RBCs. Denies any pain, states has severe neuropathy. States feels weak and tired. Physical Exam:  Vital Signs: /78   Pulse 85   Temp 97.9 °F (36.6 °C) (Oral)   Resp 19   Ht 6' 0.01\" (1.829 m)   Wt 190 lb 14.7 oz (86.6 kg)   SpO2 100%   BMI 23.05 kg/m²     Gen: alert, no distress,   Wounds: C/D/I left foot with surrounding erythema and edema. Right foot with plantar ulceration, no drainage or erythema noted around site. No active bleeding noted. HEMT: AT/NC Oropharynx pink, moist, and without lesions or exudates; dentition in good state of repair  Eyes: PERRLA, EOMI, conjunctiva pink, sclera anicteric. Neck: Supple. Trachea midline. No LAD. Chest: no distress and CTA. Good air movement. Room air. Heart: NSR and no MRG. Abd: soft, non-distended, no tenderness, no hepatomegaly. Normoactive bowel sounds. Ext: no clubbing, cyanosis, or edema, see wounds above  Neuro: Mental status intact.  CN 2-12 intact and no focal sensory or motor deficits        Radiologic / Imaging / TESTING  4/18/22 XR Foot Left:  Impression   Interval fragmentation and osteopenia throughout the 1st metatarsal.  Absence   or bony destruction of the proximal phalanx 1st digit with diffuse soft   tissue swelling.  Additional bony destruction in the proximal phalanx. Overall findings concerning for acute osteomyelitis.  Correlate for any   interval surgery.       Bony destruction of the distal tuft, distal phalanx 2nd digit concerning for   acute osteomyelitis.  Questionable osteomyelitis in the 2nd metatarsal head.      4/18/22 XR Foot Right:  Impression   Chest No acute periostitis or bony destruction.  Linear ulcer adjacent to the   1st metatarsal remnant.      4/18/22 VL Dup Lower Extremity Venous Left:  Impression   1. Occlusive thrombus in 1 of the peroneal veins. 2. Similar inguinal lymphadenopathy since at least 2021.      4/21/22 MRI Foot Left W WO Contrast:  Impression   Acute osteomyelitis involving the entire 1st metatarsal as well as the 1st   digit phalanges, with associated osseous erosions and pathologic fractures.    There is a soft tissue abscess centered at the level of the 1st metatarsal   head measuring approximately 4.5 x 3.4 x 3.7 cm.  Smaller suspected abscess   is seen at the level of the 1st IP joint.       Bone marrow edema is seen in the 2nd-4th metatarsals, all the cuneiform, and   2nd proximal and distal phalanges.  There are associated areas of T1 marrow   replacement.  Acute osteomyelitis should be suspected.       Cellulitis.       Prior amputation of the 3rd digit phalanges and a portion of the 3rd distal   metatarsal.      4/18/22 Complete Echo:  Summary   Left ventricular systolic function is normal.   Ejection fraction is visually estimated at 55%.   No significant valvular disease noted.   No evidence of any pericardial effusion       Labs:    Recent Results (from the past 24 hour(s))   Renal Function Panel    Collection Time: 04/25/22 10:20 AM   Result Value Ref Range    Sodium 137 135 - 145 MMOL/L    Potassium 3.8 3.5 - 5.1 MMOL/L    Chloride 100 99 - 110 mMol/L    CO2 26 21 - 32 MMOL/L    Anion Gap 11 4 - 16    BUN 6 6 - 23 MG/DL    CREATININE 0.7 (L) 0.9 - 1.3 MG/DL    Glucose 129 (H) 70 - 99 MG/DL    Calcium 8.5 8.3 - 10.6 MG/DL    GFR Non-African American >60 >60 mL/min/1.73m2    GFR African American >60 >60 mL/min/1.73m2    Albumin 3.4 3.4 - 5.0 GM/DL    Phosphorus 2.6 2.5 - 4.9 MG/DL   CBC with Auto Differential    Collection Time: 04/25/22 10:20 AM   Result Value Ref Range    WBC 8.1 4.0 - 10.5 K/CU MM    RBC 3.56 (L) 4.6 - 6.2 M/CU MM    Hemoglobin 10.2 (L) 13.5 - 18.0 GM/DL    Hematocrit 33.1 (L) 42 - 52 %    MCV 93.0 78 - 100 FL    MCH 28.7 27 - 31 PG    MCHC 30.8 (L) 32.0 - 36.0 %    RDW 13.4 11.7 - 14.9 %    Platelets 033 (H) 273 - 440 K/CU MM    MPV 8.2 7.5 - 11.1 FL    Differential Type AUTOMATED DIFFERENTIAL     Segs Relative 72.9 (H) 36 - 66 %    Lymphocytes % 18.6 (L) 24 - 44 %    Monocytes % 6.3 (H) 0 - 4 %    Eosinophils % 1.2 0 - 3 %    Basophils % 0.4 0 - 1 %    Segs Absolute 5.9 K/CU MM    Lymphocytes Absolute 1.5 K/CU MM    Monocytes Absolute 0.5 K/CU MM    Eosinophils Absolute 0.1 K/CU MM    Basophils Absolute 0.0 K/CU MM    Nucleated RBC % 0.0 %    Total Nucleated RBC 0.0 K/CU MM    Total Immature Neutrophil 0.05 K/CU MM    Immature Neutrophil % 0.6 (H) 0 - 0.43 %   POCT Glucose    Collection Time: 04/25/22 12:39 PM   Result Value Ref Range    POC Glucose 108 (H) 70 - 99 MG/DL   POCT Glucose    Collection Time: 04/25/22  4:38 PM   Result Value Ref Range    POC Glucose 115 (H) 70 - 99 MG/DL   POCT Glucose    Collection Time: 04/25/22 10:26 PM   Result Value Ref Range    POC Glucose 194 (H) 70 - 99 MG/DL   Hemoglobin and Hematocrit    Collection Time: 04/26/22  1:51 AM   Result Value Ref Range    Hemoglobin 7.0 (L) 13.5 - 18.0 GM/DL    Hematocrit 22.3 (L) 42 - 52 %   POCT Glucose    Collection Time: 04/26/22  2:49 AM   Result Value Ref Range    POC Glucose 246 (H) 70 - 99 MG/DL   Iron and TIBC    Collection Time: 04/26/22  3:58 AM   Result Value Ref Range    Iron 44 (L) 59 - 158 ug/dL    UIBC 131 110 - 370 ug/dL    TIBC 175 (L) 250 - 450 ug/dL    Transferrin % 25 10 - 44 %   Ferritin    Collection Time: 04/26/22  3:58 AM   Result Value Ref Range    Ferritin 659 (H) 30 - 400 NG/ML   Vitamin B12 & Folate    Collection Time: 04/26/22  3:58 AM   Result Value Ref Range    Vitamin B-12 176.9 (L) 211 - 911 pg/ml    Folate 12.4 3.1 - 17.5 NG/ML   Hemoglobin A1C    Collection Time: 04/26/22  3:58 AM   Result Value Ref Range    Hemoglobin A1C 10.4 (H) 4.2 - 6.3 %    eAG 252 mg/dL   C-Reactive Protein    Collection Time: 04/26/22  3:58 AM   Result Value Ref Range    CRP, High Sensitivity 6.5 mg/L   Hemoglobin and Hematocrit    Collection Time: 04/26/22  3:58 AM   Result Value Ref Range    Hemoglobin 6.4 (LL) 13.5 - 18.0 GM/DL    Hematocrit 21.0 (L) 42 - 52 %   TYPE AND SCREEN    Collection Time: 04/26/22  3:58 AM   Result Value Ref Range    ABO/Rh A POSITIVE     Antibody Screen NEGATIVE    POCT Glucose    Collection Time: 04/26/22  7:43 AM   Result Value Ref Range    POC Glucose 153 (H) 70 - 99 MG/DL     CULTURE results: Invalid input(s): BLOOD CULTURE,  URINE CULTURE, SURGICAL CULTURE    Diagnosis:  Patient Active Problem List   Diagnosis    Unstable angina ([facility])    Lumbar back pain with radiculopathy affecting left lower extremity    S/P lumbar laminectomy    Type 2 diabetes mellitus with diabetic neuropathy, without long-term current use of insulin ([facility])    Essential hypertension    Gastroesophageal reflux disease without esophagitis    WD-Cellulitis of foot right    Chest pain    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 ([address]    WD-Diabetic ulcer of toe of left foot associated with type 2 diabetes mellitus, with necrosis of muscle ([address]    WD-Diabetic ulcer of right midfoot associated with type 2 diabetes mellitus, with fat layer exposed ([address]    Diabetic foot ([facility])    Moderate nonproliferative diabetic retinopathy of both eyes without macular edema associated with type 2 diabetes mellitus (HCC)    Acute osteomyelitis ([address]    Acute osteomyelitis of foot ([address]    Abscess of left foot    Cellulitis of left foot    Subacute osteomyelitis of left foot (HCC)    Anemia associated with acute blood loss    Diabetic ulcer of left midfoot associated with type 2 diabetes mellitus, with muscle involvement without evidence of necrosis ([address]       Active Problems  Principal Problem:    Acute osteomyelitis (HCC)  Active Problems:    Abscess of left foot    Cellulitis of left foot    Subacute osteomyelitis of left foot (HCC)    Anemia associated with acute blood loss    Diabetic ulcer of left midfoot associated with type 2 diabetes mellitus, with muscle involvement without evidence of necrosis ([address]  Resolved Problems:    * No resolved hospital problems. *    Electronically signed by: Electronically signed by RAFAEL Thurston CNP on 4/26/2022 at 9:04 AM

## 2022-04-26 NOTE — PROGRESS NOTES
Contacted Dr. Pavan Rodríguez via telephone regarding patient saturated dressing changes 3x since shift change. He advised to hold eliquis, stat orders for H&H and remove foam sponge from wound. Obtain surgicel and fibular dressing--place in wound bed and apply 4x4, kerlix, abd pads and coban. Notify him with any changes. Will obtain items and complete dressing change. Will continue to monitor.

## 2022-04-26 NOTE — PROGRESS NOTES
Physical Therapy  Attempted to see pt this AM but per RN, pt receiving blood and fatigued due to low Hgb. Will continue to attempt as schedule allows.

## 2022-04-26 NOTE — PROGRESS NOTES
Spoke with Dr. Cadena Husbands regarding plans for NPWT. Will keep dressing to left foot amp site intact until further instuctions. Plans to hold NPWT at this time and follow up as outpatient.

## 2022-04-27 ENCOUNTER — TELEPHONE (OUTPATIENT)
Dept: FAMILY MEDICINE CLINIC | Age: 42
End: 2022-04-27

## 2022-04-27 VITALS
TEMPERATURE: 98.1 F | SYSTOLIC BLOOD PRESSURE: 132 MMHG | DIASTOLIC BLOOD PRESSURE: 76 MMHG | RESPIRATION RATE: 17 BRPM | OXYGEN SATURATION: 100 % | HEIGHT: 72 IN | HEART RATE: 81 BPM | WEIGHT: 190.92 LBS | BODY MASS INDEX: 25.86 KG/M2

## 2022-04-27 LAB
ABO/RH: NORMAL
ALBUMIN SERPL-MCNC: 3.1 GM/DL (ref 3.4–5)
ANION GAP SERPL CALCULATED.3IONS-SCNC: 9 MMOL/L (ref 4–16)
ANTIBODY SCREEN: NEGATIVE
BUN BLDV-MCNC: 12 MG/DL (ref 6–23)
CALCIUM SERPL-MCNC: 7.6 MG/DL (ref 8.3–10.6)
CHLORIDE BLD-SCNC: 102 MMOL/L (ref 99–110)
CO2: 25 MMOL/L (ref 21–32)
COMPONENT: NORMAL
CREAT SERPL-MCNC: 0.8 MG/DL (ref 0.9–1.3)
CROSSMATCH RESULT: NORMAL
GFR AFRICAN AMERICAN: >60 ML/MIN/1.73M2
GFR NON-AFRICAN AMERICAN: >60 ML/MIN/1.73M2
GLUCOSE BLD-MCNC: 117 MG/DL (ref 70–99)
GLUCOSE BLD-MCNC: 144 MG/DL (ref 70–99)
GLUCOSE BLD-MCNC: 173 MG/DL (ref 70–99)
GLUCOSE BLD-MCNC: 191 MG/DL (ref 70–99)
HIGH SENSITIVE C-REACTIVE PROTEIN: 4.1 MG/L
PHOSPHORUS: 2.6 MG/DL (ref 2.5–4.9)
POTASSIUM SERPL-SCNC: 3.8 MMOL/L (ref 3.5–5.1)
SODIUM BLD-SCNC: 136 MMOL/L (ref 135–145)
STATUS: NORMAL
TRANSFUSION STATUS: NORMAL
UNIT DIVISION: 0
UNIT NUMBER: NORMAL

## 2022-04-27 PROCEDURE — 97530 THERAPEUTIC ACTIVITIES: CPT

## 2022-04-27 PROCEDURE — 6370000000 HC RX 637 (ALT 250 FOR IP): Performed by: INTERNAL MEDICINE

## 2022-04-27 PROCEDURE — 6360000002 HC RX W HCPCS: Performed by: NURSE PRACTITIONER

## 2022-04-27 PROCEDURE — 2580000003 HC RX 258: Performed by: SURGERY

## 2022-04-27 PROCEDURE — 6360000002 HC RX W HCPCS: Performed by: SURGERY

## 2022-04-27 PROCEDURE — 2580000003 HC RX 258: Performed by: NURSE PRACTITIONER

## 2022-04-27 PROCEDURE — 80069 RENAL FUNCTION PANEL: CPT

## 2022-04-27 PROCEDURE — 86141 C-REACTIVE PROTEIN HS: CPT

## 2022-04-27 PROCEDURE — 99232 SBSQ HOSP IP/OBS MODERATE 35: CPT | Performed by: NURSE PRACTITIONER

## 2022-04-27 PROCEDURE — 97161 PT EVAL LOW COMPLEX 20 MIN: CPT

## 2022-04-27 PROCEDURE — 97116 GAIT TRAINING THERAPY: CPT

## 2022-04-27 PROCEDURE — 2500000003 HC RX 250 WO HCPCS: Performed by: SURGERY

## 2022-04-27 PROCEDURE — 6370000000 HC RX 637 (ALT 250 FOR IP): Performed by: SURGERY

## 2022-04-27 PROCEDURE — 82962 GLUCOSE BLOOD TEST: CPT

## 2022-04-27 RX ORDER — INSULIN GLARGINE 100 [IU]/ML
20 INJECTION, SOLUTION SUBCUTANEOUS NIGHTLY
Qty: 5 PEN | Refills: 0 | Status: SHIPPED | OUTPATIENT
Start: 2022-04-27 | End: 2022-05-05 | Stop reason: SDUPTHER

## 2022-04-27 RX ORDER — HYDROCODONE BITARTRATE AND ACETAMINOPHEN 5; 325 MG/1; MG/1
1 TABLET ORAL EVERY 4 HOURS PRN
Qty: 12 TABLET | Refills: 0 | Status: SHIPPED | OUTPATIENT
Start: 2022-04-27 | End: 2022-04-30

## 2022-04-27 RX ORDER — ATORVASTATIN CALCIUM 40 MG/1
40 TABLET, FILM COATED ORAL NIGHTLY
Qty: 90 TABLET | Refills: 1 | Status: SHIPPED | OUTPATIENT
Start: 2022-04-27 | End: 2022-09-26

## 2022-04-27 RX ORDER — PIOGLITAZONEHYDROCHLORIDE 30 MG/1
30 TABLET ORAL DAILY
Qty: 90 TABLET | Refills: 1 | Status: SHIPPED | OUTPATIENT
Start: 2022-04-27 | End: 2022-09-16

## 2022-04-27 RX ORDER — OMEPRAZOLE 20 MG/1
CAPSULE, DELAYED RELEASE ORAL
Qty: 90 CAPSULE | Refills: 1 | Status: SHIPPED | OUTPATIENT
Start: 2022-04-27 | End: 2022-07-29 | Stop reason: SDUPTHER

## 2022-04-27 RX ORDER — GLYBURIDE 5 MG/1
10 TABLET ORAL 2 TIMES DAILY WITH MEALS
Qty: 360 TABLET | Refills: 1 | Status: ON HOLD | OUTPATIENT
Start: 2022-04-27 | End: 2022-05-21 | Stop reason: HOSPADM

## 2022-04-27 RX ORDER — CEFAZOLIN SODIUM 1 G/3ML
2000 INJECTION, POWDER, FOR SOLUTION INTRAMUSCULAR; INTRAVENOUS EVERY 8 HOURS
Qty: 180 EACH | Refills: 1 | Status: SHIPPED | OUTPATIENT
Start: 2022-04-27 | End: 2022-06-26

## 2022-04-27 RX ORDER — ESCITALOPRAM OXALATE 10 MG/1
10 TABLET ORAL DAILY
Qty: 30 TABLET | Refills: 5 | Status: SHIPPED | OUTPATIENT
Start: 2022-04-27 | End: 2022-05-26

## 2022-04-27 RX ORDER — FLUOXETINE HYDROCHLORIDE 20 MG/1
40 CAPSULE ORAL DAILY
Qty: 60 CAPSULE | Refills: 5 | Status: SHIPPED | OUTPATIENT
Start: 2022-04-27 | End: 2022-05-05 | Stop reason: ALTCHOICE

## 2022-04-27 RX ORDER — METOPROLOL SUCCINATE 25 MG/1
25 TABLET, EXTENDED RELEASE ORAL DAILY
Qty: 90 TABLET | Refills: 1 | Status: SHIPPED | OUTPATIENT
Start: 2022-04-27 | End: 2022-07-29 | Stop reason: SDUPTHER

## 2022-04-27 RX ORDER — CEFAZOLIN SODIUM 2 G/100ML
2000 INJECTION, SOLUTION INTRAVENOUS EVERY 8 HOURS
Status: DISCONTINUED | OUTPATIENT
Start: 2022-04-27 | End: 2022-04-27 | Stop reason: HOSPADM

## 2022-04-27 RX ORDER — LOSARTAN POTASSIUM 25 MG/1
25 TABLET ORAL DAILY
Qty: 30 TABLET | Refills: 5 | Status: SHIPPED | OUTPATIENT
Start: 2022-04-27 | End: 2022-07-29 | Stop reason: SDUPTHER

## 2022-04-27 RX ORDER — GLUCOSAMINE HCL/CHONDROITIN SU 500-400 MG
CAPSULE ORAL
Qty: 100 STRIP | Refills: 0 | Status: SHIPPED | OUTPATIENT
Start: 2022-04-27

## 2022-04-27 RX ADMIN — METFORMIN HYDROCHLORIDE 500 MG: 500 TABLET ORAL at 08:58

## 2022-04-27 RX ADMIN — INSULIN LISPRO 2 UNITS: 100 INJECTION, SOLUTION INTRAVENOUS; SUBCUTANEOUS at 09:06

## 2022-04-27 RX ADMIN — METRONIDAZOLE 500 MG: 500 INJECTION, SOLUTION INTRAVENOUS at 08:59

## 2022-04-27 RX ADMIN — CYANOCOBALAMIN TAB 1000 MCG 2000 MCG: 1000 TAB at 08:58

## 2022-04-27 RX ADMIN — GLIPIZIDE 5 MG: 5 TABLET ORAL at 09:02

## 2022-04-27 RX ADMIN — FLUOXETINE 40 MG: 10 CAPSULE ORAL at 08:58

## 2022-04-27 RX ADMIN — VANCOMYCIN HYDROCHLORIDE 1250 MG: 10 INJECTION, POWDER, LYOPHILIZED, FOR SOLUTION INTRAVENOUS at 03:37

## 2022-04-27 RX ADMIN — CEFEPIME HYDROCHLORIDE 2000 MG: 2 INJECTION, POWDER, FOR SOLUTION INTRAVENOUS at 10:15

## 2022-04-27 RX ADMIN — PANTOPRAZOLE SODIUM 40 MG: 40 TABLET, DELAYED RELEASE ORAL at 09:03

## 2022-04-27 RX ADMIN — LOSARTAN POTASSIUM 50 MG: 25 TABLET, FILM COATED ORAL at 08:58

## 2022-04-27 RX ADMIN — METOPROLOL SUCCINATE 25 MG: 25 TABLET, EXTENDED RELEASE ORAL at 08:58

## 2022-04-27 RX ADMIN — CEFAZOLIN SODIUM 2000 MG: 2 INJECTION, SOLUTION INTRAVENOUS at 12:07

## 2022-04-27 RX ADMIN — SODIUM CHLORIDE, PRESERVATIVE FREE 10 ML: 5 INJECTION INTRAVENOUS at 08:58

## 2022-04-27 RX ADMIN — ESCITALOPRAM OXALATE 10 MG: 10 TABLET ORAL at 08:58

## 2022-04-27 ASSESSMENT — PAIN SCALES - GENERAL
PAINLEVEL_OUTOF10: 0
PAINLEVEL_OUTOF10: 0

## 2022-04-27 NOTE — PROGRESS NOTES
POD 4 Ray amputation of the left great toe secondary to multifocal OM. No further bleeding from left foot amputation site. Anemia due to acute blood loss from surgery and anticoagulation. Transfused 1 unit of PRBC's yesterday. Hgb is now at 7.6. Left foot drsg is c/d/i.   I did change his dressing on rounds. Some residual clot in wound bed. No active bleeding. I did send bone fragments for cx, results noted. Abx's per ID recs. PICC line in place. Ok to bear some weight on left foot with postop shoe. [facility] with wet to dry dressings every other day to left foot surgical site. F/U early next week at the [facility]. I will utilize NPWT next week.

## 2022-04-27 NOTE — PROGRESS NOTES
[facility] Liaison spoke with pt and is aware of discharge & will initiate home infusion and HHC.

## 2022-04-27 NOTE — CARE COORDINATION
Notified by patient's nurse Juliette Diggs that patient is not going to be needing CM to get wound VAC now.  Notified Kellie at [facility] to cancel order for wound VAC and had to leave a VM

## 2022-04-27 NOTE — PROGRESS NOTES
Infectious Disease Progress Note  2022   Patient Name: Pio York : 1980   Impression  · Left DFU with OM:     ? Right Plantar DFU:     ? Acute Thrombus of the Left Peroneal Vein:     § Afebrile, no leukocytosis, Pct and CRP on DWT, will treat per surgical cultures as the wound culture from  grew E.cloacae with light growth and was superficial.    § -BC 0/2-NGTD  § -MRSA/MSSA screen negative  § -Left Foot ulcer culture: Enterobacter cloacae  § -XR Foot Right: no acute periostitis or bony destruction. Linear ulcer adjacent to the 1st metatarsal remnant. § -VL Dup LLE: occlusive thrombus in 1 of the peroneal veins. Small inguinal lymphadenopathy since at least . § -Left Foot XR: imp of questionable OM  § -MRI Foot Left W WO Contrast: Acute OM involving the entire 1st metatarsal as well as the 1st digit phalanges, with associated osseous erosions and pathologic fractures. There is a soft tissue abscess centered at the level of the 1st metatarsal head measuring approx 4.5 x 3.4 x 3.7 cm. Smaller suspected abscess is seen at the level of the 1st IP joint. Bone marrow edema is seen in the 2nd-4th metatarsals, all the cuneform, and 2nd proximal and distal phalanges. There are associated areas of T1 marrow replacement. Acute OM should be suspected. Cellulitis. Prior amp of the 3rd digit phalanges and a portion of the 3rd distal metatarsal.  § Dr. Chelita Birmingham, cardiology, onboard to manage DVT. Rec Lovenox and transition to NOAC. § -Complete Echo: EF 55%, no mention of RV strain. § -s/p per Dr. Jose Zambrano, bedside debridement of left foot  § -S/p per Dr. Jose Zambrano: ray amputation of left great toe. DX:  Multifocal OM of left great toe and first metatarsal head. Cultures: Staphylococcus lugdunensis, Streptococcus anginosus.     § -Surgical Histopathology: Pending     · DMII, Uncontrolled:  ? Peripheral Neuropathy:  § Dr. Abdifatah Collado onboard  § - [facility] 10.4     ? HTN      ? HLD     ? Lumbar Radiculopathy     ? H/o Septic Embolism 6/20     · Multi-morbidity: per PMHx:  Bilateral inguinal hernia repair 2020, lumbar laminectomy 2018, right toe amputation 1/21.     Plan:  · DC IV vancomycin, cefepime and Flagyl  ? Start IV cefazolin 2 gm q8h for 6 week duration from surgical intervention (6/4/22)  · PICC line intact for IV ABX therapy  ? Await 4/23 histopathology report. I DW Dr. Laney Griffiths, pathology, who confirmed the margins at the MT head are positive for acute OM   ? Follow up with ID clinic in 2 weeks please  Weekly labs drawn on Monday during the course of treatment  CBC with differential, CMP, ESR, CRP  Fax results to Attn: [facility] Infectious Diseases Staff  ? # [phone] OK from ID standpoint to DC when ready      Ongoing Antimicrobial Therapy  Cefazolin 4/27-? Completed Antimicrobial Therapy  Zosyn 4/18-22? Cefepime 4/22-27  Flagyl 4/22-27  Vancomycin 4/18-27  ? History:? Interval history noted. Chief complaint: Left DFU with OM. Denies n/v/d/f or untoward effects of antibiotics. States he is feeling much better today, no active bleeding from left foot. States wants to go home today. Physical Exam:  Vital Signs: /76   Pulse 81   Temp 98.1 °F (36.7 °C) (Oral)   Resp 17   Ht 6' 0.01\" (1.829 m)   Wt 190 lb 14.7 oz (86.6 kg)   SpO2 100%   BMI 23.05 kg/m²     Gen: Resting quietly, no distress, A&Ox4. Wounds: C/D/I left foot with surrounding erythema and edema. Right foot with plantar ulceration, no drainage or erythema noted around site. No active bleeding noted. HEMT: AT/NC Oropharynx pink, moist, and without lesions or exudates; dentition in good state of repair  Eyes: PERRLA, EOMI, conjunctiva pink, sclera anicteric. Neck: Supple. Trachea midline. No LAD. Chest: no distress and CTA. Good air movement. Room air. Heart: NSR and no MRG. Abd: soft, non-distended, no tenderness, no hepatomegaly.  Normoactive bowel sounds. Ext: no clubbing, cyanosis, or edema, see wounds above  Neuro: Mental status intact. CN 2-12 intact and no focal sensory or motor deficits        Radiologic / Imaging / TESTING  4/18/22 XR Foot Left:  Impression   Interval fragmentation and osteopenia throughout the 1st metatarsal.  Absence   or bony destruction of the proximal phalanx 1st digit with diffuse soft   tissue swelling.  Additional bony destruction in the proximal phalanx. Overall findings concerning for acute osteomyelitis.  Correlate for any   interval surgery.       Bony destruction of the distal tuft, distal phalanx 2nd digit concerning for   acute osteomyelitis.  Questionable osteomyelitis in the 2nd metatarsal head.      4/18/22 XR Foot Right:  Impression   Chest No acute periostitis or bony destruction.  Linear ulcer adjacent to the   1st metatarsal remnant.      4/18/22 VL Dup Lower Extremity Venous Left:  Impression   1. Occlusive thrombus in 1 of the peroneal veins. 2. Similar inguinal lymphadenopathy since at least 2021.      4/21/22 MRI Foot Left W WO Contrast:  Impression   Acute osteomyelitis involving the entire 1st metatarsal as well as the 1st   digit phalanges, with associated osseous erosions and pathologic fractures.    There is a soft tissue abscess centered at the level of the 1st metatarsal   head measuring approximately 4.5 x 3.4 x 3.7 cm.  Smaller suspected abscess   is seen at the level of the 1st IP joint.       Bone marrow edema is seen in the 2nd-4th metatarsals, all the cuneiform, and   2nd proximal and distal phalanges.  There are associated areas of T1 marrow   replacement.  Acute osteomyelitis should be suspected.       Cellulitis.       Prior amputation of the 3rd digit phalanges and a portion of the 3rd distal   metatarsal.      4/18/22 Complete Echo:  Summary   Left ventricular systolic function is normal.   Ejection fraction is visually estimated at 55%.   No significant valvular disease noted.   No with acute blood loss    Diabetic ulcer of left midfoot associated with type 2 diabetes mellitus, with muscle involvement without evidence of necrosis (HCC)       Active Problems  Principal Problem:    Acute osteomyelitis (HCC)  Active Problems:    Abscess of left foot    Cellulitis of left foot    Subacute osteomyelitis of left foot (HCC)    Anemia associated with acute blood loss    Diabetic ulcer of left midfoot associated with type 2 diabetes mellitus, with muscle involvement without evidence of necrosis ([address]  Resolved Problems:    * No resolved hospital problems. *    Electronically signed by: Electronically signed by Rashmi Reno.  RAFAEL Fontanez CNP on 4/27/2022 at 10:33 AM

## 2022-04-27 NOTE — FLOWSHEET NOTE
Patient given discharge instructions, voices understanding. Denies questions/concerns. Saline lock removed from RFA. Patient given Rx from OP pharmacy. Patient dressing. RN to call [employer] for transport.

## 2022-04-27 NOTE — PROGRESS NOTES
Outpatient Pharmacy Progress Note for Meds-to-Beds    Total number of Prescriptions Filled: 13  The following medications were dispensed to the patient during the discharge process:   Atorvastatin    Glyburide   Losartan   Metoprolol   Escitalopram   Pioglitazone   Omeprazole   Aspirin   Metformin   Hydrocodone-APAP   Lantus insulin pens   Alcohol prep wipes   Insulin pen needles    Additional Documentation:   Medication(s) were delivered to the patient's room prior to discharge      Thank you for letting us serve your patients.   [facility]    [address], [address] [facility]    Phone: [phone]    Fax: [phone]

## 2022-04-27 NOTE — DISCHARGE INSTR - DIET
Good nutrition is important when healing from an illness, injury, or surgery. Follow any nutrition recommendations given to you during your hospital stay. If you were given an oral nutrition supplement while in the hospital, continue to take this supplement at home. You can take it with meals, in-between meals, and/or before bedtime. These supplements can be purchased at most local grocery stores, pharmacies, and chain [employer]-stores. If you have any questions about your diet or nutrition, call the hospital and ask for the dietitian.     RESUME PRE-ADMISSION DIET AS TOLERATED

## 2022-04-27 NOTE — PROGRESS NOTES
Physical Therapy  Facility/Department: [address] [facility] STEPDOWN  Physical Therapy Initial Assessment    Name: Willian Diaz  : 1980  MRN: [id number]  Date of Service: 2022    Discharge Recommendations:  Home with assist PRN,Home with Home health PT   PT Equipment Recommendations  Equipment Needed: No      Patient Diagnosis(es): The primary encounter diagnosis was Acute osteomyelitis of foot ([address] Diagnoses of Acute deep vein thrombosis (DVT) of left peroneal vein (HCC), Hyperglycemia, Uncontrolled type 2 diabetes with cellulitis of foot ([address] Type 2 diabetes mellitus with diabetic neuropathy, without long-term current use of insulin ([address] Essential hypertension, Gastroesophageal reflux disease without esophagitis, JODY (generalized anxiety disorder), and Subacute osteomyelitis of left foot ([address] were also pertinent to this visit. Past Medical History:  has a past medical history of Abscess of left foot, Anemia associated with acute blood loss, Callus of foot, Cellulitis of left foot, Diabetic ulcer of left midfoot associated with type 2 diabetes mellitus, with muscle involvement without evidence of necrosis ([address] Diabetic ulcer of left midfoot associated with type 2 diabetes mellitus, with necrosis of muscle ([address] Diabetic ulcer of right midfoot associated with type 2 diabetes mellitus, with fat layer exposed ([address] Dizziness, Essential hypertension, Hyperlipidemia, Lumbar radiculopathy, Septic embolism ([address] and Subacute osteomyelitis of left foot ([address] Past Surgical History:  has a past surgical history that includes Cardiac catheterization (2018); [place] tooth extraction; Dental surgery; pr office/outpt visit,procedure only (Left, 2018); lumbar laminectomy (2018); Toe amputation (Right, 2021); Achilles tendon surgery (Right, 2021); hernia repair (Bilateral, 2020); and Toe amputation (Left, 2022).     Assessment   Body Structures, Functions, Activity Limitations Requiring Skilled Therapeutic Intervention: Decreased functional mobility ; Increased pain;Decreased balance;Decreased sensation;Decreased endurance;Decreased safe awareness  Assessment: Pt presents 9 days s/p admission for nonhealing diabetic ulcer; per imaging, pt demonstrates evidence of LLE DVT, OM of first ray on L foot; underwent great toe amputation on 4/23, some WB in post-op shoe on LLE. Pt presents from home, ramped entry, 2 story home, ind PLOF, walking w/o AD prior, caregiver for his mother. Medical hx includes anemia, DM, HLD, HTN, radiculopathy. Pt presents w/ the above listed deficits, however, he is primarily limited by impaired functional mobility. Slight to no pain, no physical assist, dec protective sensation, hx of poor wound healing, impaired balance. Recommending home w/ assist prn and HH PT for balance, endurance, strength, gait training d/t BL amputations. Therapy Prognosis:  (fair +)  Decision Making: Low Complexity  Requires PT Follow-Up: Yes  Activity Tolerance  Activity Tolerance: Patient tolerated evaluation without incident     Treatment:  Therapeutic Activity Training:   Therapeutic activity training was instructed today. Cues were given for safety, sequence, UE/LE placement, awareness, and balance. Activities performed today included bed mobility training, sup-sit, sit-stand, SPT. Functional mobility, DC planning, safety education, skin care reinforcement initiated this session    Gait Training:  Cues were given for safety, sequence, device management, balance, posture, awareness, path.     WB education, gait training, pacing education initiated this session      Plan   Plan  Plan:  (4+)  Plan weeks: 1  Current Treatment Recommendations: Strengthening,Balance training,Functional mobility training,Transfer training,Gait training,Stair training,ADL/Self-care training,Endurance training,Pain management,Safety education & training,Patient/Caregiver education & training,Positioning,Modalities,Equipment evaluation, education, & procurement,Therapeutic activities  Safety Devices  Type of Devices: Call light within reach,Chair alarm in place,Gait belt,Patient at risk for falls,Left in chair     Restrictions  Restrictions/Precautions  Restrictions/Precautions: Fall Risk,General Precautions,Weight Bearing  Lower Extremity Weight Bearing Restrictions  Partial Weight Bearing Percentage Or Pounds: some WB in post-op shoes     Subjective   General  Chart Reviewed: Yes  Patient assessed for rehabilitation services?: Yes  Family / Caregiver Present: No  Follows Commands: Within Functional Limits  Subjective  Subjective: I wasn't expecting to have someone come and walk w/ me today.          Social/Functional History  Social/Functional History  Lives With:  (mother)  Type of Home: House  Home Layout: Two level,Able to Live on Main level with bedroom/bathroom  Home Access: Ramped entrance  Bathroom Shower/Tub: Shower chair without back,Tub/Shower unit  Bathroom Toilet: Standard  Bathroom Equipment: 3-in-1 commode  Bathroom Accessibility: Bioclones accessible  Home Equipment: Wheelchair-manual,Walker, rolling,Cane  Has the patient had two or more falls in the past year or any fall with injury in the past year?: No (1 mechanical fall; slipped in shower)  Receives Help From: Family  ADL Assistance: [address]: Independent  Homemaking Responsibilities: Yes  Ambulation Assistance: Independent (no AD prev)  Transfer Assistance: Independent  Active : Yes  Occupation: On disability,Retired  Type of Occupation: RSD at [address]  Additional Comments: flat HOB  Vision/Hearing  Vision Exceptions: Wears glasses at all times  Hearing: Within functional limits    Cognition   Orientation  Overall Orientation Status: Within Normal Limits  Cognition  Overall Cognitive Status: WFL     Objective   Pulse: 81  Heart Rate Source: Monitor  BP: 132/76  BP Location: Right upper arm  BP Method: Automatic  Patient Position: Left side; Turns self  MAP (Calculated): 94.67  Resp: 17  SpO2: 100 %  O2 Device: None (Room air)     Observation/Palpation  Posture: Good  Observation: Supine, awake, alert, agreeable. He is moving well, denies any pain, motivated to return home to assist in caring for his mother. Tele, peripheral IV, post-op shoe in place.         AROM RLE (degrees)  RLE AROM: WFL  AROM LLE (degrees)  LLE AROM : WFL  LLE General AROM: limited by dressing, swelling, pain; remains WFL  Strength RLE  Strength RLE: WFL  Comment: grossly >3/5 as observed by functional mobility  Strength LLE  Strength LLE: WFL  Comment: grossly >3/5 as observed by functional mobility           Bed mobility  Supine to Sit: Stand by assistance  Scooting: Stand by assistance  Transfers  Sit to Stand: Stand by assistance  Stand to sit: Stand by assistance  Bed to Chair: Stand by assistance  Ambulation  Device: Rolling Walker  Assistance: Stand by assistance  Gait Deviations: Slow Magda;Decreased step length  Distance: 125 ft  Comments: focusing on step to w/ affected side, heel > forefoot WB, reinforcing dec WB and use of AD to offload ; post-op shoe for ambulation; grossly stable, good awareness of need for safety after R LE transmet amputation prev  More Ambulation?: No     Balance  Posture: Good  Sitting - Static: Good  Sitting - Dynamic: Good  Standing - Static: Good       AM-PAC Score  AM-PAC Inpatient Mobility Raw Score : 18 (04/27/22 1257)  AM-PAC Inpatient T-Scale Score : 43.63 (04/27/22 1257)  Mobility Inpatient CMS 0-100% Score: 46.58 (04/27/22 1257)  Mobility Inpatient CMS G-Code Modifier : CK (04/27/22 1257)          Goals  Long Term Goals  Time Frame for Long term goals : 1 week  Long term goal 1: pt will complete bed mobility at mod ind  Long term goal 2: pt will complete transfers at sup level  Long term goal 3: pt will ambulate 145 ft using LRAD at sup level  Long term goal 4: pt will verbalize and demonstrate understanding of compliance w/ WB precautions to optimize soft tissue healing  Patient Goals   Patient goals : return home       Therapy Time   Individual Concurrent Group Co-treatment   Time In 4646         Time Out 1120         Minutes 33         Timed Code Treatment Minutes: 23 Minutes (GT, TA)       Osito Messina, PT, DPT

## 2022-04-27 NOTE — CONSULTS
Consult reviewed with primary nurse. Education regarding PICC insertion discussed and risks and benefits assessed with Patient. Patient verbalized understanding. Consent given by Patient. Time out done @ 22:10. PICC inserted in CAROLYN without difficulty per protocol. Placement verified via VPSG4 monitoring system. Good blood return and flushes easily on the single port. Patient tolerated well. Education pamphlets left at bedside. Ultrasound photos of vein diameter and doppler signature placed in chart. Please consult IV/PICC team if patient's needs change. PICC OK to use.

## 2022-04-27 NOTE — DISCHARGE SUMMARY
Discharge Summary    Name:  Nikhil Montgomery /Age/Sex: 1980  (39 y.o. male)   MRN & CSN:  [id number] & [id number] Admission Date/Time: 2022 11:35 AM   Attending:  Lorena Galvin MD Discharging Physician: Lorena Galvin MD     Hospital Course:   Nikhil Montgomery is a 39 y.o.  male with a past medical history of type 2 diabetes, dyslipidemia, hypertension, peroneal thrombus, chronic back pain s/p laminectomy, diabetic foot infection s/p right amputation 2021 who presented to the ED on 2022 with concern for left foot stump infection and was diagnosed with Acute osteomyelitis [facility])  Since hospitalization, patient underwent left great toe ray amputation on 2022. He remains on broad-spectrum IV antibiotics while waiting final sensitivities from operative cultures. I do not see any acute organ dysfunction to suggest sepsis. Patient underwent ray amputation of the left great toe on 2022. ID service recommended 6 weeks of antibiotics to end on 2022. Surgery also recommends consideration of hyperbaric oxygen. Patient has poorly controlled diabetes with an A1c of 10.4. We will continue his oral hypoglycemic agents at home but I will be adding insulin at discharge due to poor control. He had thrombocytosis which is likely reactive. He had severe anemia due to acute blood loss operatively and postoperatively due to anticoagulation prior to surgery. He was transfused 1 unit PRBC. He had ultrasound finding of left peroneal vein occlusive DVT for which she was originally started on anticoagulation. Given his current situation, the location of the blood clot, I think we can cautiously watch him and discharging him without therapeutic anticoagulation. Finally, he had evidence of vitamin B12 deficiency with B12 levels down to 176. To be discharged on vitamin B12 replacement.   The patient/family expressed appropriate understanding of and agreement with the discharge recommendations, medications, and plan. Consults this admission:  IP CONSULT TO GENERAL SURGERY  IP CONSULT TO HOSPITALIST  IP CONSULT TO CARDIOLOGY  IP CONSULT TO DIETITIAN  IP CONSULT TO ENDOCRINOLOGY  IP CONSULT TO INFECTIOUS DISEASES  IP CONSULT TO [address] [place] [address] NEEDS    Discharge Instruction:   Follow up appointments: Endocrinologist, surgery, ID  Primary care physician:  within 2 weeks    Diet:  diabetic diet   Activity: activity as tolerated  Disposition: Discharged to:   []Home, [x]HHC, []SNF, []Acute Rehab, []Hospice   Condition on discharge: Stable    Discharge Medications:        Medication List      START taking these medications    Basaglar KwikPen 100 UNIT/ML injection pen  Generic drug: insulin glargine  Inject 20 Units into the skin nightly     ceFAZolin 1 g injection  Commonly known as: ANCEF  Infuse 2,000 mg intravenously every 8 hours     HYDROcodone-acetaminophen 5-325 MG per tablet  Commonly known as: NORCO  Take 1 tablet by mouth every 4 hours as needed (Postoperative pain) for up to 3 days. CONTINUE taking these medications    acetaminophen 325 MG tablet  Commonly known as: TYLENOL  Take 2 tablets by mouth every 4 hours as needed for Pain or Fever     aspirin 81 MG tablet  Take 1 tablet by mouth daily     atorvastatin 40 MG tablet  Commonly known as: LIPITOR  Take 1 tablet by mouth nightly     blood glucose monitor kit and supplies  Dispense sufficient amount for indicated testing frequency plus additional to accommodate PRN testing needs. Dispense all needed supplies to include: monitor, strips, lancing device, lancets, control solutions, alcohol swabs. blood glucose test strips  Test 2 times a day & as needed for symptoms of irregular blood glucose. Dispense sufficient amount for indicated testing frequency plus additional to accommodate PRN testing needs.      escitalopram 10 MG tablet  Commonly known as: Lexapro  Take 1 tablet by mouth daily     FLUoxetine 20 MG capsule  Commonly known as: PROZAC  Take 2 capsules by mouth daily     FreeStyle Lancets Misc  1 each by Does not apply route daily     glyBURIDE 5 MG tablet  Commonly known as: DIABETA  Take 2 tablets by mouth 2 times daily (with meals)     losartan 25 MG tablet  Commonly known as: COZAAR  Take 1 tablet by mouth daily     metFORMIN 500 MG tablet  Commonly known as: GLUCOPHAGE  Take 2 tablets by mouth 2 times daily (with meals) Indications: Start tomorrow 03/14     metoprolol succinate 25 MG extended release tablet  Commonly known as: TOPROL XL  Take 1 tablet by mouth daily     omeprazole 20 MG delayed release capsule  Commonly known as: PRILOSEC  Take 1 capsule by mouth once daily in the morning     pioglitazone 30 MG tablet  Commonly known as: Actos  Take 1 tablet by mouth daily        STOP taking these medications    hydroCHLOROthiazide 12.5 MG capsule  Commonly known as: Microzide           Where to Get Your Medications      These medications were sent to [address] [place] [address], [facility] [address]    Phone: [phone]   aspirin 81 MG tablet  atorvastatin 40 MG tablet  Basaglar KwikPen 100 UNIT/ML injection pen  blood glucose test strips  escitalopram 10 MG tablet  FLUoxetine 20 MG capsule  glyBURIDE 5 MG tablet  HYDROcodone-acetaminophen 5-325 MG per tablet  losartan 25 MG tablet  metFORMIN 500 MG tablet  metoprolol succinate 25 MG extended release tablet  omeprazole 20 MG delayed release capsule  pioglitazone 30 MG tablet     You can get these medications from any pharmacy    Bring a paper prescription for each of these medications  ceFAZolin 1 g injection          Objective Findings at Discharge:   /76   Pulse 81   Temp 98.1 °F (36.7 °C) (Oral)   Resp 17   Ht 6' 0.01\" (1.829 m)   Wt 190 lb 14.7 oz (86.6 kg)   SpO2 100%   BMI 23.05 kg/m²            PHYSICAL EXAM  GEN:   Awake male, alert and oriented x3 in no apparent distress. Appears given age. HEENT: Normal.  RESP: Clear lung fields bilaterally. Symmetric chest movement while on RA  CVS: RRR, S1, S2  GI/: Abdomen is soft, nontender, no organomegaly. . Bowel sounds normal, rectal exam deferred. No CVA tenderness. MSK:   No gross joint deformities. No tenderness. Left foot stump in dressing. SKIN:   Normal coloration, warm, dry. Bilateral upper extremity tattoos. NEURO: Cranial nerves appear grossly intact, normal speech, no lateralizing weakness. PSYCH:  Affect appropriate  BMP/CBC  Recent Labs     04/24/22  1311 04/24/22  1311 04/25/22  1020 04/25/22  1020 04/26/22  0151 04/26/22  0358 04/26/22  1644 04/27/22  0937     --  137  --   --   --   --  136   K 4.1  --  3.8  --   --   --   --  3.8     --  100  --   --   --   --  102   CO2 26  --  26  --   --   --   --  25   BUN 4*  --  6  --   --   --   --  12   CREATININE 0.6*  --  0.7*  --   --   --   --  0.8*   WBC 9.7  --  8.1  --   --   --   --   --    HCT 30.6*   < > 33.1*   < > 22.3* 21.0* 24.3*  --    *  --  602*  --   --   --   --   --     < > = values in this interval not displayed.        Discharge Time of 43 minutes    Electronically signed by Jayleen Light MD on 4/27/2022 at 11:43 AM

## 2022-04-28 ENCOUNTER — TELEPHONE (OUTPATIENT)
Dept: FAMILY MEDICINE CLINIC | Age: 42
End: 2022-04-28

## 2022-04-28 LAB
CULTURE: ABNORMAL
Lab: ABNORMAL
SPECIMEN: ABNORMAL

## 2022-04-28 NOTE — TELEPHONE ENCOUNTER
[facility] Transitions Initial Follow Up Call    Outreach made within 2 business days of discharge: Yes    Patient: Dee Dee Brian Patient : 1980   MRN: [id number]  Reason for Admission: There are no discharge diagnoses documented for the most recent discharge. Discharge Date: 22       Spoke with: patient    Discharge department/facility: [address] [place] [address] Interactive Patient Contact:  Was patient able to fill all prescriptions: Yes  Was patient instructed to bring all medications to the follow-up visit: Yes  Is patient taking all medications as directed in the discharge summary?  Yes  Does patient understand their discharge instructions: Yes  Does patient have questions or concerns that need addressed prior to 7-14 day follow up office visit: no    Scheduled appointment with PCP within 7-14 days    Follow Up  Future Appointments   Date Time Provider Rosario Nesbitt   2022  8:30 AM Lakeisha Spain, APRN - CNP [address] [place]

## 2022-04-29 NOTE — PROGRESS NOTES
time.  Cranial nerves II-XII are grossly intact. Motor is  intact. Sensory neuropathy. ,  and gait is abnormal.  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 ([facility])     WD-Diabetic ulcer of toe of left foot associated with type 2 diabetes mellitus, with necrosis of muscle ([facility])     WD-Diabetic ulcer of right midfoot associated with type 2 diabetes mellitus, with fat layer exposed ([address]     Diabetic foot ([facility])     Moderate nonproliferative diabetic retinopathy of both eyes without macular edema associated with type 2 diabetes mellitus ([address]     Acute osteomyelitis ([address]     Acute osteomyelitis of foot ([address]     Abscess of left foot     Cellulitis of left foot     Subacute osteomyelitis of left foot ([address]     Patient had amputation of left big toe and part of second left second toe on 4/23/2022    Plan:     1. Reviewed POC blood glucose . Labs and X ray results   2. Reviewed Current Medicines   3. On meal/ Correction bolus Humalog/ Basal Lantus Insulin regime   4. Monitor Blood glucose frequently   5. Modified  the dose of Insulin/ other medicines as needed   6. Will follow     .      Leigha Camacho MD, MD

## 2022-04-29 NOTE — PROGRESS NOTES
Progress Note( Dr. Grace Colby)    Subjective:   Admit Date: 4/18/2022  PCP: Kennedy Alvarez MD        Admitted For :Infection of the left foot possible osteomyelitis    Consulted For: Better control of blood glucose    Interval History: Feels lot better. Patient had amputation of left big toe and part of second left second toe on 4/23/2022  As expected complaining of some pain in the operative site  Has a lot of bleeding at the operative site when he had wound vacuum to the point that he has to give given blood transfusion to a 1 unit so the wound vacuum was removed    Denies any chest pains,   Denies SOB . Denies nausea or vomiting. No new bowel or bladder symptoms. No intake or output data in the 24 hours ending 04/29/22 1952    DATA    CBC:   No results for input(s): WBC, HGB, PLT in the last 72 hours. CMP:  Recent Labs     04/27/22  0937      K 3.8      CO2 25   BUN 12   CREATININE 0.8*   CALCIUM 7.6*   LABALBU 3.1*     Lipids:   Lab Results   Component Value Date    CHOL 108 03/13/2021    CHOL 142 03/12/2020    HDL 27 03/13/2021    TRIG 178 03/13/2021     Glucose:  Recent Labs     04/27/22  0222 04/27/22  0757 04/27/22  1213   POCGLU 117* 144* 191*     [id number]:  Lab Results   Component Value Date    LABA1C 10.4 04/26/2022     High Sensitivity TSH:   Lab Results   Component Value Date    TSHHS 1.770 03/12/2021     Free T3: No results found for: FT3  Free T4:No results found for: T4FREE    MRI FOOT LEFT W WO CONTRAST   Final Result   Acute osteomyelitis involving the entire 1st metatarsal as well as the 1st   digit phalanges, with associated osseous erosions and pathologic fractures. There is a soft tissue abscess centered at the level of the 1st metatarsal   head measuring approximately 4.5 x 3.4 x 3.7 cm. Smaller suspected abscess   is seen at the level of the 1st IP joint.       Bone marrow edema is seen in the 2nd-4th metatarsals, all the cuneiforms, and   2nd proximal and left second toe on 4/23/2022  Neurologic:  Awake, alert, oriented to name, place and time. Cranial nerves II-XII are grossly intact. Motor is  intact. Sensory neuropathy. ,  and gait is abnormal.  And unstable    Assessment:     Patient Active Problem List:     Unstable angina ([facility])     Lumbar back pain with radiculopathy affecting left lower extremity     S/P lumbar laminectomy     Type 2 diabetes mellitus with diabetic neuropathy, without long-term current use of insulin ([facility])     Essential hypertension     Gastroesophageal reflux disease without esophagitis     WD-Cellulitis of foot right     Chest pain     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 ([facility])     WD-Diabetic ulcer of toe of left foot associated with type 2 diabetes mellitus, with necrosis of muscle ([facility])     WD-Diabetic ulcer of right midfoot associated with type 2 diabetes mellitus, with fat layer exposed ([address]     Diabetic foot ([facility])     Moderate nonproliferative diabetic retinopathy of both eyes without macular edema associated with type 2 diabetes mellitus ([address]     Acute osteomyelitis ([address]     Acute osteomyelitis of foot ([address]     Abscess of left foot     Cellulitis of left foot     Subacute osteomyelitis of left foot ([address]     Patient had amputation of left big toe and part of second left second toe on 4/23/2022    Plan:     1. Reviewed POC blood glucose . Labs and X ray results   2. Reviewed Current Medicines   3. On meal/ Correction bolus Humalog/ Basal Lantus Insulin regime   4. Monitor Blood glucose frequently   5. Modified  the dose of Insulin/ other medicines as needed   6. Will follow     .      Cornell Hickey MD, MD

## 2022-04-29 NOTE — PROGRESS NOTES
Tiffanie Barnett to [facility] on    4/18/2022   at 14:11. Final   1. Occlusive thrombus in 1 of the peroneal veins. 2. Similar inguinal lymphadenopathy since at least 2021. XR FOOT RIGHT (MIN 3 VIEWS)   Final Result   Chest No acute periostitis or bony destruction. Linear ulcer adjacent to the   1st metatarsal remnant. XR FOOT LEFT (MIN 3 VIEWS)   Final Result   Interval fragmentation and osteopenia throughout the 1st metatarsal.  Absence   or bony destruction of the proximal phalanx 1st digit with diffuse soft   tissue swelling. Additional bony destruction in the proximal phalanx. Overall findings concerning for acute osteomyelitis. Correlate for any   interval surgery. Bony destruction of the distal tuft, distal phalanx 2nd digit concerning for   acute osteomyelitis. Questionable osteomyelitis in the 2nd metatarsal head. VL LOWER EXTREMITY BILATERAL VENOUS DUPLEX    (Results Pending)        Scheduled Medicines   Medications:      Infusions:         Objective:   Vitals: /76   Pulse 81   Temp 98.1 °F (36.7 °C) (Oral)   Resp 17   Ht 6' 0.01\" (1.829 m)   Wt 190 lb 14.7 oz (86.6 kg)   SpO2 100%   BMI 23.05 kg/m²   General appearance: alert and cooperative with exam  Neck: no JVD or bruit  Thyroid : Normal lobes   Lungs: Has Vesicular Breath sounds   Heart:  regular rate and rhythm  Abdomen: soft, non-tender; bowel sounds normal; no masses,  no organomegaly  Musculoskeletal: Normal  Extremities: extremities normal, , no edema right foot partial amputation//Patient had amputation of left big toe and part of second left second toe on 4/23/2022  Neurologic:  Awake, alert, oriented to name, place and time. Cranial nerves II-XII are grossly intact. Motor is  intact. Sensory neuropathy. ,  and gait is abnormal.  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)     WD-Diabetic ulcer of toe of left foot associated with type 2 diabetes mellitus, with necrosis of muscle (HCC)     WD-Diabetic ulcer of right midfoot associated with type 2 diabetes mellitus, with fat layer exposed ([address]     Diabetic foot (HCC)     Moderate nonproliferative diabetic retinopathy of both eyes without macular edema associated with type 2 diabetes mellitus ([address]     Acute osteomyelitis ([address]     Acute osteomyelitis of foot ([address]     Abscess of left foot     Cellulitis of left foot     Subacute osteomyelitis of left foot ([address]     Patient had amputation of left big toe and part of second left second toe on 4/23/2022    Plan:     1. Reviewed POC blood glucose . Labs and X ray results   2. Reviewed Current Medicines   3. On meal/ Correction bolus Humalog/ Basal Lantus Insulin regime   4. Monitor Blood glucose frequently   5. Modified  the dose of Insulin/ other medicines as needed   6. Will follow     .      Brannon Ye MD, MD

## 2022-05-03 ENCOUNTER — HOSPITAL ENCOUNTER (OUTPATIENT)
Age: 42
Setting detail: SPECIMEN
Discharge: HOME | End: 2022-05-03
Payer: COMMERCIAL

## 2022-05-03 LAB
ALBUMIN SERPL-MCNC: 3.3 GM/DL (ref 3.4–5)
ALP BLD-CCNC: 92 IU/L (ref 40–128)
ALT SERPL-CCNC: 10 U/L (ref 10–40)
ANION GAP SERPL CALCULATED.3IONS-SCNC: 12 MMOL/L (ref 4–16)
AST SERPL-CCNC: 18 IU/L (ref 15–37)
BASOPHILS ABSOLUTE: 0.1 K/CU MM
BASOPHILS RELATIVE PERCENT: 0.6 % (ref 0–1)
BILIRUB SERPL-MCNC: 0.3 MG/DL (ref 0–1)
BUN BLDV-MCNC: 7 MG/DL (ref 6–23)
C-REACTIVE PROTEIN, HIGH SENSITIVITY: 17.4 MG/L
CALCIUM SERPL-MCNC: 8.3 MG/DL (ref 8.3–10.6)
CHLORIDE BLD-SCNC: 103 MMOL/L (ref 99–110)
CO2: 24 MMOL/L (ref 21–32)
CREAT SERPL-MCNC: 0.7 MG/DL (ref 0.9–1.3)
DIFFERENTIAL TYPE: ABNORMAL
EOSINOPHILS ABSOLUTE: 0.5 K/CU MM
EOSINOPHILS RELATIVE PERCENT: 4.6 % (ref 0–3)
ERYTHROCYTE SEDIMENTATION RATE: 51 MM/HR (ref 0–15)
GFR AFRICAN AMERICAN: >60 ML/MIN/1.73M2
GFR NON-AFRICAN AMERICAN: >60 ML/MIN/1.73M2
GLUCOSE BLD-MCNC: 153 MG/DL (ref 70–99)
HCT VFR BLD CALC: 27.4 % (ref 42–52)
HEMOGLOBIN: 8.5 GM/DL (ref 13.5–18)
IMMATURE NEUTROPHIL %: 0.6 % (ref 0–0.43)
LYMPHOCYTES ABSOLUTE: 1.8 K/CU MM
LYMPHOCYTES RELATIVE PERCENT: 17.2 % (ref 24–44)
MCH RBC QN AUTO: 29.7 PG (ref 27–31)
MCHC RBC AUTO-ENTMCNC: 31 % (ref 32–36)
MCV RBC AUTO: 95.8 FL (ref 78–100)
MONOCYTES ABSOLUTE: 0.8 K/CU MM
MONOCYTES RELATIVE PERCENT: 8 % (ref 0–4)
NUCLEATED RBC %: 0 %
PDW BLD-RTO: 16 % (ref 11.7–14.9)
PLATELET # BLD: 683 K/CU MM (ref 140–440)
PMV BLD AUTO: 9 FL (ref 7.5–11.1)
POTASSIUM SERPL-SCNC: 4.4 MMOL/L (ref 3.5–5.1)
RBC # BLD: 2.86 M/CU MM (ref 4.6–6.2)
SEGMENTED NEUTROPHILS ABSOLUTE COUNT: 7.3 K/CU MM
SEGMENTED NEUTROPHILS RELATIVE PERCENT: 69 % (ref 36–66)
SODIUM BLD-SCNC: 139 MMOL/L (ref 135–145)
TOTAL IMMATURE NEUTOROPHIL: 0.06 K/CU MM
TOTAL NUCLEATED RBC: 0 K/CU MM
TOTAL PROTEIN: 6.9 GM/DL (ref 6.4–8.2)
WBC # BLD: 10.5 K/CU MM (ref 4–10.5)

## 2022-05-03 PROCEDURE — 85652 RBC SED RATE AUTOMATED: CPT

## 2022-05-03 PROCEDURE — 85025 COMPLETE CBC W/AUTO DIFF WBC: CPT

## 2022-05-03 PROCEDURE — 80053 COMPREHEN METABOLIC PANEL: CPT

## 2022-05-03 PROCEDURE — 86140 C-REACTIVE PROTEIN: CPT

## 2022-05-05 ENCOUNTER — OFFICE VISIT (OUTPATIENT)
Dept: FAMILY MEDICINE CLINIC | Age: 42
End: 2022-05-05
Payer: COMMERCIAL

## 2022-05-05 VITALS
BODY MASS INDEX: 24.39 KG/M2 | HEIGHT: 72 IN | HEART RATE: 95 BPM | SYSTOLIC BLOOD PRESSURE: 120 MMHG | OXYGEN SATURATION: 98 % | DIASTOLIC BLOOD PRESSURE: 64 MMHG | WEIGHT: 180.1 LBS

## 2022-05-05 DIAGNOSIS — E11.40 TYPE 2 DIABETES MELLITUS WITH DIABETIC NEUROPATHY, WITHOUT LONG-TERM CURRENT USE OF INSULIN (HCC): ICD-10-CM

## 2022-05-05 DIAGNOSIS — Z09 HOSPITAL DISCHARGE FOLLOW-UP: Primary | ICD-10-CM

## 2022-05-05 DIAGNOSIS — F41.9 ANXIETY: ICD-10-CM

## 2022-05-05 PROCEDURE — 99214 OFFICE O/P EST MOD 30 MIN: CPT | Performed by: PHYSICIAN ASSISTANT

## 2022-05-05 PROCEDURE — 1111F DSCHRG MED/CURRENT MED MERGE: CPT | Performed by: PHYSICIAN ASSISTANT

## 2022-05-05 RX ORDER — BLOOD PRESSURE TEST KIT
KIT MISCELLANEOUS
COMMUNITY
Start: 2022-04-27

## 2022-05-05 RX ORDER — INSULIN GLARGINE 100 [IU]/ML
24 INJECTION, SOLUTION SUBCUTANEOUS NIGHTLY
Qty: 3 PEN | Refills: 2 | Status: SHIPPED | OUTPATIENT
Start: 2022-05-05 | End: 2022-06-30 | Stop reason: SDUPTHER

## 2022-05-05 RX ORDER — INSULIN GLARGINE 100 [IU]/ML
24 INJECTION, SOLUTION SUBCUTANEOUS NIGHTLY
Qty: 15 PEN | Refills: 1 | Status: SHIPPED | OUTPATIENT
Start: 2022-05-05 | End: 2022-05-05

## 2022-05-05 NOTE — PROGRESS NOTES
Post-Discharge Transitional Care  Follow Up      Naye Reeder   YOB: 1980    Date of Office Visit:  5/5/2022  Date of Hospital Admission: 4/18/22  Date of Hospital Discharge: 4/27/22  Risk of hospital readmission (high >=14%. Medium >=10%) :Readmission Risk Score: 14.3 ( )      Care management risk score Rising risk (score 2-5) and Complex Care (Scores >=6): 5     Non face to face  following discharge, date last encounter closed (first attempt may have been earlier): 4/28/2022 10:51 AM    Call initiated 2 business days of discharge: Yes    ASSESSMENT/PLAN:   Hospital discharge follow-up  -     insulin glargine (BASAGLAR KWIKPEN) 100 UNIT/ML injection pen; Inject 24 Units into the skin nightly, Disp-15 pen, R-1Normal  -     MS DISCHARGE MEDS RECONCILED W/ CURRENT OUTPATIENT MED LIST  Type 2 diabetes mellitus with diabetic neuropathy, without long-term current use of insulin (HCC)  -     insulin glargine (BASAGLAR KWIKPEN) 100 UNIT/ML injection pen; Inject 24 Units into the skin nightly, Disp-15 pen, R-1Normal  Anxiety    Medical Decision Making: moderate complexity  Return in 1 month (on 6/5/2022). Subjective:   HPI:  Follow up of Hospital problems/diagnosis(es): osteomyelitis of left foot with amputation of big toe  Uncontrolled type 2 diabetes    Inpatient course: Discharge summary reviewed- see chart. Interval history/Current status:   Pt is now on insulin glargine, 20 u nightly  Is following with Jermaine Diane, is going to call today to set up appointment  Pt is going to be on self administered ancef for 6 total weeks  Noted pt still on prozac but also started on lexapro, both still on his chart - needs to d/c prozac  Pt following with Dr. Issac Boxer at wound care clinic. Pt has been running 210-201 in the mornings. We will adjust his Lantus to 24 units nightly.   Patient to continue monitoring sugar and if it does not go down into range he needs to add on another 4 units nightly. 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 ([address]    WD-Diabetic ulcer of toe of left foot associated with type 2 diabetes mellitus, with necrosis of muscle ([address]    WD-Diabetic ulcer of right midfoot associated with type 2 diabetes mellitus, with fat layer exposed ([address]    Diabetic foot ([address]    Moderate nonproliferative diabetic retinopathy of both eyes without macular edema associated with type 2 diabetes mellitus ([address]    Acute osteomyelitis ([address]    Acute osteomyelitis of foot ([address]    Abscess of left foot    Cellulitis of left foot    Subacute osteomyelitis of left foot ([address]    Anemia associated with acute blood loss    Diabetic ulcer of left midfoot associated with type 2 diabetes mellitus, with muscle involvement without evidence of necrosis ([address]       Medications listed as ordered at the time of discharge from hospital     Medication List          Accurate as of May 5, 2022 11:51 AM. If you have any questions, ask your nurse or doctor.             CHANGE how you take these medications    Sadiq May 100 UNIT/ML injection pen  Generic drug: insulin glargine  Inject 24 Units into the skin nightly  What changed: how much to take        CONTINUE taking these medications    acetaminophen 325 MG tablet  Commonly known as: TYLENOL  Take 2 tablets by mouth every 4 hours as needed for Pain or Fever     Alcohol Swabs Pads     aspirin 81 MG tablet  Take 1 tablet by mouth daily     atorvastatin 40 MG tablet  Commonly known as: LIPITOR  Take 1 tablet by mouth nightly     blood glucose monitor kit and supplies  Dispense sufficient amount for indicated testing frequency plus additional to accommodate PRN testing needs. Dispense all needed supplies to include: monitor, strips, lancing device, lancets, control solutions, alcohol swabs. blood glucose test strips  Test 2 times a day & as needed for symptoms of irregular blood glucose. Dispense sufficient amount for indicated testing frequency plus additional to accommodate PRN testing needs.      ceFAZolin 1 g injection  Commonly known as: ANCEF  Infuse 2,000 mg intravenously every 8 hours     cyanocobalamin 2000 MCG tablet  Take 1 tablet by mouth daily     escitalopram 10 MG tablet  Commonly known as: Lexapro  Take 1 tablet by mouth daily     FreeStyle Lancets Misc  1 each by Does not apply route daily     glyBURIDE 5 MG tablet  Commonly known as: DIABETA  Take 2 tablets by mouth 2 times daily (with meals)     losartan 25 MG tablet  Commonly known as: COZAAR  Take 1 tablet by mouth daily     metFORMIN 500 MG tablet  Commonly known as: GLUCOPHAGE  Take 2 tablets by mouth 2 times daily (with meals) Indications: Start tomorrow 03/14     metoprolol succinate 25 MG extended release tablet  Commonly known as: TOPROL XL  Take 1 tablet by mouth daily     omeprazole 20 MG delayed release capsule  Commonly known as: PRILOSEC  Take 1 capsule by mouth once daily in the morning     pioglitazone 30 MG tablet  Commonly known as: Actos  Take 1 tablet by mouth daily        STOP taking these medications    FLUoxetine 20 MG capsule  Commonly known as: PROZAC           Where to Get Your Medications      These medications were sent to [address] [place] [address] (), [place] - [address] [phone] - F [phone]  [address] Ruth Flores    Phone: [phone]   · Sadiq May 100 UNIT/ML injection pen           Medications marked \"taking\" at this time  Outpatient Medications Marked as Taking for the 5/5/22 encounter (Office Visit) with Gerhard Dance, PA   Medication Sig Dispense Refill    Alcohol Swabs PADS       insulin glargine (BASAGLAR KWIKPEN) 100 UNIT/ML injection pen Inject 24 Units into the skin nightly 15 pen 1    glyBURIDE (DIABETA) 5 MG tablet Take 2 tablets by mouth 2 times daily (with meals) 360 tablet 1    metFORMIN (GLUCOPHAGE) 500 MG tablet Take 2 tablets by mouth 2 times daily (with meals) Indications: Start tomorrow 03/14 360 tablet 1    pioglitazone (ACTOS) 30 MG tablet Take 1 tablet by mouth daily 90 tablet 1    atorvastatin (LIPITOR) 40 MG tablet Take 1 tablet by mouth nightly 90 tablet 1    losartan (COZAAR) 25 MG tablet Take 1 tablet by mouth daily 30 tablet 5    metoprolol succinate (TOPROL XL) 25 MG extended release tablet Take 1 tablet by mouth daily 90 tablet 1    blood glucose monitor strips Test 2 times a day & as needed for symptoms of irregular blood glucose. Dispense sufficient amount for indicated testing frequency plus additional to accommodate PRN testing needs. 100 strip 0    omeprazole (PRILOSEC) 20 MG delayed release capsule Take 1 capsule by mouth once daily in the morning 90 capsule 1    escitalopram (LEXAPRO) 10 MG tablet Take 1 tablet by mouth daily 30 tablet 5    aspirin 81 MG tablet Take 1 tablet by mouth daily 30 tablet 3    ceFAZolin (ANCEF) 1 g injection Infuse 2,000 mg intravenously every 8 hours 180 each 1    blood glucose monitor kit and supplies Dispense sufficient amount for indicated testing frequency plus additional to accommodate PRN testing needs. Dispense all needed supplies to include: monitor, strips, lancing device, lancets, control solutions, alcohol swabs.  1 kit 0    FreeStyle Lancets MISC 1 each by Does not apply route daily 100 each 3    acetaminophen (TYLENOL) 325 MG tablet Take 2 tablets by mouth every 4 hours as needed for Pain or Fever 120 tablet 3         Medications patient taking as of now reconciled against medications ordered at time of hospital discharge: Yes    A comprehensive review of systems was negative except for what was noted in the HPI. Objective:    /64 (Site: Right Upper Arm, Position: Sitting, Cuff Size: Medium Adult)   Pulse 95   Ht 6' (1.829 m)   Wt 180 lb 1.6 oz (81.7 kg)   SpO2 98%   BMI 24.43 kg/m²       Physical Exam  Constitutional:       Appearance: Normal appearance. He is normal weight. HENT:      Head: Normocephalic and atraumatic. Right Ear: Tympanic membrane and external ear normal.      Left Ear: Tympanic membrane and external ear normal.      Nose: No rhinorrhea. Mouth/Throat:      Mouth: Mucous membranes are moist.      Pharynx: Oropharynx is clear. No oropharyngeal exudate or posterior oropharyngeal erythema. Eyes:      General: No scleral icterus. Extraocular Movements: Extraocular movements intact. Conjunctiva/sclera: Conjunctivae normal.      Pupils: Pupils are equal, round, and reactive to light. Cardiovascular:      Rate and Rhythm: Normal rate and regular rhythm. Pulses: Normal pulses. Heart sounds: Normal heart sounds. No murmur heard. No friction rub. No gallop. Pulmonary:      Effort: Pulmonary effort is normal.      Breath sounds: Normal breath sounds. No wheezing, rhonchi or rales. Abdominal:      General: Bowel sounds are normal. There is no distension. Palpations: Abdomen is soft. There is no mass. Tenderness: There is no abdominal tenderness. There is no right CVA tenderness, left CVA tenderness, guarding or rebound. Musculoskeletal:         General: No deformity. Normal range of motion. Cervical back: Normal range of motion and neck supple. No rigidity. No muscular tenderness. Right lower leg: No edema. Left lower leg: No edema. Skin:     General: Skin is warm and dry. Capillary Refill: Capillary refill takes less than 2 seconds. Findings: No bruising, erythema or rash.    Neurological:      General: No focal deficit present. Mental Status: He is alert and oriented to person, place, and time. Coordination: Coordination normal.      Gait: Gait normal.   Psychiatric:         Mood and Affect: Mood normal.         Behavior: Behavior normal.       An electronic signature was used to authenticate this note.   --PERCY Wei

## 2022-05-10 ENCOUNTER — HOSPITAL ENCOUNTER (OUTPATIENT)
Age: 42
Setting detail: SPECIMEN
Discharge: HOME | End: 2022-05-10
Payer: COMMERCIAL

## 2022-05-10 LAB
ALBUMIN SERPL-MCNC: 3.5 GM/DL (ref 3.4–5)
ALP BLD-CCNC: 104 IU/L (ref 40–128)
ALT SERPL-CCNC: 8 U/L (ref 10–40)
ANION GAP SERPL CALCULATED.3IONS-SCNC: 14 MMOL/L (ref 4–16)
AST SERPL-CCNC: 19 IU/L (ref 15–37)
BASOPHILS ABSOLUTE: 0.1 K/CU MM
BASOPHILS RELATIVE PERCENT: 0.7 % (ref 0–1)
BILIRUB SERPL-MCNC: 0.3 MG/DL (ref 0–1)
BUN BLDV-MCNC: 7 MG/DL (ref 6–23)
C-REACTIVE PROTEIN, HIGH SENSITIVITY: 10 MG/L
CALCIUM SERPL-MCNC: 8.2 MG/DL (ref 8.3–10.6)
CHLORIDE BLD-SCNC: 99 MMOL/L (ref 99–110)
CO2: 23 MMOL/L (ref 21–32)
CREAT SERPL-MCNC: 0.7 MG/DL (ref 0.9–1.3)
DIFFERENTIAL TYPE: ABNORMAL
EOSINOPHILS ABSOLUTE: 0.3 K/CU MM
EOSINOPHILS RELATIVE PERCENT: 3.9 % (ref 0–3)
ERYTHROCYTE SEDIMENTATION RATE: 46 MM/HR (ref 0–15)
GFR AFRICAN AMERICAN: >60 ML/MIN/1.73M2
GFR NON-AFRICAN AMERICAN: >60 ML/MIN/1.73M2
GLUCOSE BLD-MCNC: 180 MG/DL (ref 70–99)
HCT VFR BLD CALC: 32.2 % (ref 42–52)
HEMOGLOBIN: 9.7 GM/DL (ref 13.5–18)
IMMATURE NEUTROPHIL %: 0.2 % (ref 0–0.43)
LYMPHOCYTES ABSOLUTE: 2.1 K/CU MM
LYMPHOCYTES RELATIVE PERCENT: 25.7 % (ref 24–44)
MCH RBC QN AUTO: 30 PG (ref 27–31)
MCHC RBC AUTO-ENTMCNC: 30.1 % (ref 32–36)
MCV RBC AUTO: 99.7 FL (ref 78–100)
MONOCYTES ABSOLUTE: 0.8 K/CU MM
MONOCYTES RELATIVE PERCENT: 9.3 % (ref 0–4)
NUCLEATED RBC %: 0 %
PDW BLD-RTO: 15.7 % (ref 11.7–14.9)
PLATELET # BLD: 651 K/CU MM (ref 140–440)
PMV BLD AUTO: 9 FL (ref 7.5–11.1)
POTASSIUM SERPL-SCNC: 4.6 MMOL/L (ref 3.5–5.1)
RBC # BLD: 3.23 M/CU MM (ref 4.6–6.2)
SEGMENTED NEUTROPHILS ABSOLUTE COUNT: 4.8 K/CU MM
SEGMENTED NEUTROPHILS RELATIVE PERCENT: 60.2 % (ref 36–66)
SODIUM BLD-SCNC: 136 MMOL/L (ref 135–145)
TOTAL IMMATURE NEUTOROPHIL: 0.02 K/CU MM
TOTAL NUCLEATED RBC: 0 K/CU MM
TOTAL PROTEIN: 7.4 GM/DL (ref 6.4–8.2)
WBC # BLD: 8 K/CU MM (ref 4–10.5)

## 2022-05-10 PROCEDURE — 85025 COMPLETE CBC W/AUTO DIFF WBC: CPT

## 2022-05-10 PROCEDURE — 86140 C-REACTIVE PROTEIN: CPT

## 2022-05-10 PROCEDURE — 85652 RBC SED RATE AUTOMATED: CPT

## 2022-05-10 PROCEDURE — 80053 COMPREHEN METABOLIC PANEL: CPT

## 2022-05-11 ENCOUNTER — HOSPITAL ENCOUNTER (OUTPATIENT)
Dept: WOUND CARE | Age: 42
Discharge: HOME | End: 2022-05-11
Payer: COMMERCIAL

## 2022-05-11 VITALS
TEMPERATURE: 97 F | DIASTOLIC BLOOD PRESSURE: 83 MMHG | HEART RATE: 83 BPM | SYSTOLIC BLOOD PRESSURE: 132 MMHG | RESPIRATION RATE: 16 BRPM

## 2022-05-11 DIAGNOSIS — L97.523 DIABETIC ULCER OF TOE OF LEFT FOOT ASSOCIATED WITH TYPE 2 DIABETES MELLITUS, WITH NECROSIS OF MUSCLE (HCC): Primary | ICD-10-CM

## 2022-05-11 DIAGNOSIS — E11.621 DIABETIC ULCER OF TOE OF LEFT FOOT ASSOCIATED WITH TYPE 2 DIABETES MELLITUS, WITH NECROSIS OF MUSCLE (HCC): Primary | ICD-10-CM

## 2022-05-11 PROCEDURE — 99213 OFFICE O/P EST LOW 20 MIN: CPT

## 2022-05-11 PROCEDURE — 11047 DBRDMT BONE EACH ADDL: CPT

## 2022-05-11 PROCEDURE — 99214 OFFICE O/P EST MOD 30 MIN: CPT

## 2022-05-11 PROCEDURE — 11044 DBRDMT BONE 1ST 20 SQ CM/<: CPT

## 2022-05-11 NOTE — PROGRESS NOTES
Nonselective enzymatic debridement performed with Santyl per physician order to wound(s) of the efe feet  Patient tolerated the procedure well.

## 2022-05-11 NOTE — PROGRESS NOTES
Wound Care Center Progress Note With Procedure    Bert Soto  AGE: 39 y.o. GENDER: male  : 1980  EPISODE DATE:  2022     Subjective:     Chief Complaint   Patient presents with    Wound Check     left great toe amp site         HISTORY of PRESENT ILLNESS      Bert Soto is a 39 y.o. male who presents today for wound evaluation of Chronic diabetic and non-healing surgical ulcer(s) of the left medial foot. The ulcer is of marked severity. The underlying cause of the wound is diabetes, infection and non-healing surgical.  Stable.   Wound Pain Timing/Severity: none  Quality of pain: N/A  Severity of pain:  0 / 10   Modifying Factors: diabetes, poor glucose control and non-adherence  Associated Signs/Symptoms: edema and drainage        PAST MEDICAL HISTORY        Diagnosis Date    Abscess of left foot 2022    Anemia associated with acute blood loss 2022    Callus of foot     Right foot - see's Dr. Lonnie Flowers    Cellulitis of left foot 2022    Diabetic ulcer of left midfoot associated with type 2 diabetes mellitus, with muscle involvement without evidence of necrosis ([address] 2022    Diabetic ulcer of left midfoot associated with type 2 diabetes mellitus, with necrosis of muscle ([address] 2021    Diabetic ulcer of right midfoot associated with type 2 diabetes mellitus, with fat layer exposed ([address] 2020    Dizziness     positional    Essential hypertension     Follows with PCP    Hyperlipidemia     Lumbar radiculopathy     Septic embolism ([address] 2020    Subacute osteomyelitis of left foot ([address] 2022       PAST SURGICAL HISTORY    Past Surgical History:   Procedure Laterality Date    ACHILLES TENDON SURGERY Right 2021    RIGHT ACHILLES TENDON LENGTHENING REPAIR performed by Joie Leo DPM at [facility] 30  2018 [facility]    DENTAL SURGERY      teeth extractions -half per patient    HERNIA REPAIR Bilateral 5/27/2020    BIALTERAL HERNIA INGUINAL REPAIR performed by Lester Carrion MD at [address] [place] [address]  2018    CT OFFICE/OUTPT VISIT,PROCEDURE ONLY Left 4/17/2018    L5-S1 HEMILAMINECTOMY, REMOVAL OF DISC LEFT SIDE performed by Mike Marquez MD at [address] Right 1/8/2021    RIGHT TRANSMETATARSAL TOE AMPUTATION performed by Hernan Chaudhry DPM at [facility] TOE AMPUTATION Left 4/23/2022    LEFT RAY GREAT TOE AMPUTATION performed by Brenda Parham MD at [address] [facility]         FAMILY HISTORY    Family History   Problem Relation Age of Onset    Heart Disease Father     Diabetes Father     Diabetes Mother     Heart Disease Mother     Other Mother     Kidney Disease Mother     Heart Attack Mother        SOCIAL HISTORY    Social History     Tobacco Use    Smoking status: Never Smoker    Smokeless tobacco: Never Used   Vaping Use    Vaping Use: Never used   Substance Use Topics    Alcohol use: Yes     Comment: \"couple beers a night\"    Drug use: No       ALLERGIES    No Known Allergies    MEDICATIONS    Current Outpatient Medications on File Prior to Encounter   Medication Sig Dispense Refill    Alcohol Swabs PADS       insulin glargine (LANTUS SOLOSTAR) 100 UNIT/ML injection pen Inject 24 Units into the skin nightly 3 pen 2    glyBURIDE (DIABETA) 5 MG tablet Take 2 tablets by mouth 2 times daily (with meals) 360 tablet 1    metFORMIN (GLUCOPHAGE) 500 MG tablet Take 2 tablets by mouth 2 times daily (with meals) Indications: Start tomorrow 03/14 360 tablet 1    pioglitazone (ACTOS) 30 MG tablet Take 1 tablet by mouth daily 90 tablet 1    atorvastatin (LIPITOR) 40 MG tablet Take 1 tablet by mouth nightly 90 tablet 1    losartan (COZAAR) 25 MG tablet Take 1 tablet by mouth daily 30 tablet 5    metoprolol succinate (TOPROL XL) 25 MG extended release tablet Take 1 tablet by mouth daily 90 tablet 1    blood glucose monitor strips Test 2 times a day & as needed for symptoms of irregular blood glucose. Dispense sufficient amount for indicated testing frequency plus additional to accommodate PRN testing needs. 100 strip 0    omeprazole (PRILOSEC) 20 MG delayed release capsule Take 1 capsule by mouth once daily in the morning 90 capsule 1    escitalopram (LEXAPRO) 10 MG tablet Take 1 tablet by mouth daily 30 tablet 5    aspirin 81 MG tablet Take 1 tablet by mouth daily 30 tablet 3    ceFAZolin (ANCEF) 1 g injection Infuse 2,000 mg intravenously every 8 hours 180 each 1    vitamin B-12 2000 MCG tablet Take 1 tablet by mouth daily 30 tablet 3    blood glucose monitor kit and supplies Dispense sufficient amount for indicated testing frequency plus additional to accommodate PRN testing needs. Dispense all needed supplies to include: monitor, strips, lancing device, lancets, control solutions, alcohol swabs. 1 kit 0    FreeStyle Lancets MISC 1 each by Does not apply route daily 100 each 3    acetaminophen (TYLENOL) 325 MG tablet Take 2 tablets by mouth every 4 hours as needed for Pain or Fever 120 tablet 3     No current facility-administered medications on file prior to encounter. REVIEW OF SYSTEMS    Pertinent items are noted in HPI. Constitutional: Negative for systemic symptoms including fever, chills and malaise. Objective:      /83   Pulse 83   Temp 97 °F (36.1 °C) (Temporal)   Resp 16     PHYSICAL EXAM      General: The patient is in no acute distress. Mental status:  Patient is appropriate, is  oriented to place and plan of care.   Dermatologic exam: Visual inspection of the periwound reveals the skin to be moist  Wound exam: see wound description below in procedure note      Assessment:     Problem List Items Addressed This Visit     WD-Diabetic ulcer of toe of left foot associated with type 2 diabetes mellitus, with necrosis of muscle ([facility] - Primary        Procedure Note    Indications:  Based on my examination of this patient's wound(s) today, sharp excision into necrotic bone is required to promote healing and evaluate the extent of previous healing. Performed by: Truong Madrigal MD    Consent obtained: Yes    Time out taken:  Yes    Pain Control: Anesthetic  Anesthetic: 5% Lidocaine Ointment Topical     Debridement:Excisional Debridement    Using #15 blade scalpel the wound(s) was/were sharply debrided down through and including the removal of bone. Devitalized Tissue Debrided:  slough and exudate    Pre Debridement Measurements:  Are located in the Wound Documentation Flow Sheet    All active wounds listed below with today's date are evaluated  Wound(s)    debrided this date include # : 1     Post  Debridement Measurements:  Negative Pressure Wound Therapy Foot Left (Active)   Wound Type Surgical 04/25/22 0915   Unit Type kci 04/25/22 0915   Dressing Type Black Foam;White Foam 04/25/22 0915   Cycle Continuous 04/25/22 0915   Target Pressure (mmHg) 125 04/25/22 0915   Canister changed?  Yes 04/25/22 0915   Dressing Status New dressing applied 04/25/22 0915   Dressing Changed Changed/New 04/25/22 0915   Drainage Amount Large 04/25/22 0915   Drainage Description Sanguinous 04/25/22 0915   Dressing Change Due 04/27/22 04/25/22 0915   Wound Assessment Red;Pink 04/25/22 0915   Odor None 04/25/22 0915   Number of days: 15       Incision 04/17/18 Back (Active)   Number of days: 8358       Wound 06/13/20 Tibial Right pt states reddened and rash like after a sunburn 6/11/2020 (Active)   Number of days: 696       Wound 05/11/22 #1 (onset 2 weeks) Left Medial Foot Amp Site (Active)   Wound Image   05/11/22 1027   Wound Etiology Surgical 05/11/22 1027   Wound Cleansed Wound cleanser 05/11/22 1027   Offloading for Diabetic Foot Ulcers Walker 05/11/22 1027   Wound Length (cm) 10.5 cm 05/11/22 1027   Wound Width (cm) 4.9 cm 05/11/22 1027   Wound Depth (cm) 0.5 cm 05/11/22 1027   Wound Surface Area (cm^2) 51.45 cm^2 05/11/22 1027   Wound Volume (cm^3) 25.725 cm^3 05/11/22 1027   Post-Procedure Length (cm) 10.5 cm 05/11/22 1055   Post-Procedure Width (cm) 4.9 cm 05/11/22 1055   Post-Procedure Depth (cm) 0.5 cm 05/11/22 1055   Post-Procedure Surface Area (cm^2) 51.45 cm^2 05/11/22 1055   Post-Procedure Volume (cm^3) 25.725 cm^3 05/11/22 1055   Distance Tunneling (cm) 0 cm 05/11/22 1027   Tunneling Position ___ O'Clock 0 05/11/22 1027   Undermining Starts ___ O'Clock 0 05/11/22 1027   Undermining Ends___ O'Clock 0 05/11/22 1027   Undermining Maxium Distance (cm) 0 05/11/22 1027   Wound Assessment Pink/red;Slough 05/11/22 1027   Drainage Amount Moderate 05/11/22 1027   Drainage Description Serosanguinous 05/11/22 1027   Odor None 05/11/22 1027   Rosalie-wound Assessment Fragile 05/11/22 1027   Margins Defined edges; Unattached edges 05/11/22 1027   Wound Thickness Description not for Pressure Injury Full thickness 05/11/22 1027   Number of days: 0       Wound 05/11/22 #2 (onset unknown) Left Plantar (Active)   Wound Image   05/11/22 1027   Wound Cleansed Wound cleanser 05/11/22 1027   Offloading for Diabetic Foot Ulcers Walker 05/11/22 1027   Wound Length (cm) 1.6 cm 05/11/22 1027   Wound Width (cm) 1 cm 05/11/22 1027   Wound Depth (cm) 0.2 cm 05/11/22 1027   Wound Surface Area (cm^2) 1.6 cm^2 05/11/22 1027   Wound Volume (cm^3) 0.32 cm^3 05/11/22 1027   Post-Procedure Length (cm) 1.6 cm 05/11/22 1055   Post-Procedure Width (cm) 1 cm 05/11/22 1055   Post-Procedure Depth (cm) 0.2 cm 05/11/22 1055   Post-Procedure Surface Area (cm^2) 1.6 cm^2 05/11/22 1055   Post-Procedure Volume (cm^3) 0.32 cm^3 05/11/22 1055   Distance Tunneling (cm) 0 cm 05/11/22 1027   Tunneling Position ___ O'Clock 0 05/11/22 1027   Undermining Starts ___ O'Clock 1200 05/11/22 1027   Undermining Ends___ O'Clock 1200 05/11/22 1027   Undermining Maxium Distance (cm) 0.3 05/11/22 1027   Wound Assessment Pink/red;Slough 05/11/22 1027   Drainage Amount Moderate 05/11/22 1027 Drainage Description Serosanguinous 05/11/22 1027   Odor None 05/11/22 1027   Rosalie-wound Assessment Fragile 05/11/22 1027   Margins Defined edges; Unattached edges 05/11/22 1027   Wound Thickness Description not for Pressure Injury Full thickness 05/11/22 1027   Number of days: 0       Wound 05/11/22 #3 (onset unknown) Right Plantar (Active)   Wound Image   05/11/22 1027   Wound Cleansed Wound cleanser 05/11/22 1027   Offloading for Diabetic Foot Ulcers Walker 05/11/22 1027   Wound Length (cm) 1.5 cm 05/11/22 1027   Wound Width (cm) 0.9 cm 05/11/22 1027   Wound Depth (cm) 0.4 cm 05/11/22 1027   Wound Surface Area (cm^2) 1.35 cm^2 05/11/22 1027   Wound Volume (cm^3) 0.54 cm^3 05/11/22 1027   Post-Procedure Length (cm) 1.5 cm 05/11/22 1055   Post-Procedure Width (cm) 0.9 cm 05/11/22 1055   Post-Procedure Depth (cm) 0.4 cm 05/11/22 1055   Post-Procedure Surface Area (cm^2) 1.35 cm^2 05/11/22 1055   Post-Procedure Volume (cm^3) 0.54 cm^3 05/11/22 1055   Distance Tunneling (cm) 0 cm 05/11/22 1027   Tunneling Position ___ O'Clock 0 05/11/22 1027   Undermining Starts ___ O'Clock 0 05/11/22 1027   Undermining Ends___ O'Clock 0 05/11/22 1027   Undermining Maxium Distance (cm) 0 05/11/22 1027   Wound Assessment Pink/red;Slough 05/11/22 1027   Drainage Amount Moderate 05/11/22 1027   Drainage Description Serosanguinous 05/11/22 1027   Odor None 05/11/22 1027   Rosalie-wound Assessment Fragile 05/11/22 1027   Margins Defined edges; Unattached edges 05/11/22 1027   Wound Thickness Description not for Pressure Injury Full thickness 05/11/22 1027   Number of days: 0       Percent of Wound(s) Debrided: approximately 100%    Total  Area  Debrided:  51 sq cm     Bleeding:  Minimal    Hemostasis Achieved:  by pressure    Procedural Pain:  4  / 10     Post Procedural Pain:  0 / 10     Response to treatment:  Well tolerated by patient. Status of wound progress and description from last visit:   Stable.       Plan:       Discharge Instructions       PHYSICIAN ORDERS AND DISCHARGE INSTRUCTIONS     NOTE: Upon discharge from the [address], you will receive a patient experience survey. We would be grateful if you would take the time to fill this survey out.     Wound care order history:                 YADY's   Right       Left               Date:     Continuing wound care orders and information:                 Residence:  Private              Continue home health care with: PICC LINE ONLY                Your wound-care supplies will be provided by:      Wound cleansing:               MP not scrub or use excessive force.              Wash hands with soap and water before and after dressing changes.             EJMUR to applying a clean dressing, cleanse wound with normal saline, wound cleanser, or mild soap and water.               Ask the physician or nurse before getting the wound(s) wet in a shower     Daily Wound management:              Keep weight off wounds and reposition every 2 hours.              Avoid standing for long periods of time.              Apply wraps/stockings in AM and remove at bedtime.              If swelling is present, elevate legs to the level of the heart or above for 30 minutes 4-5 times a day and/or when sitting.                                      When taking antibiotics take entire prescription as ordered by physician do not stop taking until medicine is all gone.                              Wound Care Notes:   Rx: [address] [facility]  Right and Left foot wounds cultured 11/4/21  Apply for wound vac 05/11/22  Apply for grafts 05/11/22  Santyl ordered 05/11/22                               Orders for this week:  05/11/22                 Left Foot Toe amp site and Right Plantar Foot - Wash with soap and water, pat dry.    Apply santyl and ca alginate to wound beds  Cover with 4x4 gauze   Wrap Right Foot with kerlix and left foot with conform  Secure with tape   Change Daily         Follow up in 1 week in the wound care center. Call [phone] for any questions or concerns.   Date__________   Time____________        Treatment Note      Written Patient Dismissal Instructions Given            Electronically signed by Francisca Diop MD on 5/11/2022 at 11:15 AM

## 2022-05-11 NOTE — PLAN OF CARE
Problem: Chronic Conditions and Co-morbidities  Goal: Patient's chronic conditions and co-morbidity symptoms are monitored and maintained or improved  Outcome: Progressing     Problem: Skin/Tissue Integrity  Goal: Absence of new skin breakdown  Description: 1. Monitor for areas of redness and/or skin breakdown  2. Assess vascular access sites hourly  3. Every 4-6 hours minimum:  Change oxygen saturation probe site  4. Every 4-6 hours:  If on nasal continuous positive airway pressure, respiratory therapy assess nares and determine need for appliance change or resting period.   Outcome: Progressing     Problem: Pain  Goal: Verbalizes/displays adequate comfort level or baseline comfort level  Outcome: Progressing

## 2022-05-11 NOTE — LETTER
Letter of Medical Necessity  [address] [facility] WOUND CARE  [place] [address] [place] [facility] [address]  [phone]  Dept: [facility] RECORD NUMBER:  [id number]  AGE: 39 y.o. GENDER: male : 1980  TODAYS DATE:  2022                To Whom It May Concern:    I am writing to notify you of my intent to treat Mr. Glyn Bence with PuraPlyTM Antimicrobial Wound Matrix (PuraPly AM), which is a collagen wound matrix containing the antimicrobial agent Polyhexamethylene Biguanide Hydrochloride (PHMB). PuraPly AM is intended for the management of various wound types and to provide an effective barrier to resist microbial colonization. The patients medical history is as follows:    See attached notes. PuraPly AM was FDA cleared in . PuraPly AM is intended for the management of wounds,  and as an effective barrier to resist microbial colonization within the dressing and reduce  microbes penetrating through the dressing. PuraPly AM is indicated for the management of:   Partial and full-thickness wounds   Pressure ulcers   Venous ulcers   Diabetic ulcers   Chronic vascular ulcers   Tunneled/undermined wounds   Surgical wounds (donor sites/grafts, post-Mohs surgery, post-laser surgery,     podiatric, wound dehiscence)   Trauma wounds (abrasions, lacerations, second-degree burns, skin tears)   Draining wounds    I believe my patient will benefit from this treatment. Please feel free to contact me if  additional information is required to process my request for coverage.     Sincerely,      [address] [place] [address] Provider

## 2022-05-17 ENCOUNTER — HOSPITAL ENCOUNTER (OUTPATIENT)
Age: 42
Setting detail: SPECIMEN
Discharge: HOME | End: 2022-05-17
Payer: COMMERCIAL

## 2022-05-17 LAB
ALBUMIN SERPL-MCNC: 3.6 GM/DL (ref 3.4–5)
ALP BLD-CCNC: 111 IU/L (ref 40–128)
ALT SERPL-CCNC: 8 U/L (ref 10–40)
ANION GAP SERPL CALCULATED.3IONS-SCNC: 12 MMOL/L (ref 4–16)
AST SERPL-CCNC: 18 IU/L (ref 15–37)
BASOPHILS ABSOLUTE: 0.1 K/CU MM
BASOPHILS RELATIVE PERCENT: 0.8 % (ref 0–1)
BILIRUB SERPL-MCNC: 0.3 MG/DL (ref 0–1)
BUN BLDV-MCNC: 8 MG/DL (ref 6–23)
C-REACTIVE PROTEIN, HIGH SENSITIVITY: 13.7 MG/L
CALCIUM SERPL-MCNC: 8.6 MG/DL (ref 8.3–10.6)
CHLORIDE BLD-SCNC: 101 MMOL/L (ref 99–110)
CO2: 24 MMOL/L (ref 21–32)
CREAT SERPL-MCNC: 0.6 MG/DL (ref 0.9–1.3)
DIFFERENTIAL TYPE: ABNORMAL
EOSINOPHILS ABSOLUTE: 0.2 K/CU MM
EOSINOPHILS RELATIVE PERCENT: 3.5 % (ref 0–3)
ERYTHROCYTE SEDIMENTATION RATE: 60 MM/HR (ref 0–15)
GFR AFRICAN AMERICAN: >60 ML/MIN/1.73M2
GFR NON-AFRICAN AMERICAN: >60 ML/MIN/1.73M2
GLUCOSE BLD-MCNC: 170 MG/DL (ref 70–99)
HCT VFR BLD CALC: 33.3 % (ref 42–52)
HEMOGLOBIN: 10.1 GM/DL (ref 13.5–18)
IMMATURE NEUTROPHIL %: 0.3 % (ref 0–0.43)
LYMPHOCYTES ABSOLUTE: 1.7 K/CU MM
LYMPHOCYTES RELATIVE PERCENT: 27.5 % (ref 24–44)
MCH RBC QN AUTO: 30.1 PG (ref 27–31)
MCHC RBC AUTO-ENTMCNC: 30.3 % (ref 32–36)
MCV RBC AUTO: 99.1 FL (ref 78–100)
MONOCYTES ABSOLUTE: 0.6 K/CU MM
MONOCYTES RELATIVE PERCENT: 10 % (ref 0–4)
NUCLEATED RBC %: 0 %
PDW BLD-RTO: 15.1 % (ref 11.7–14.9)
PLATELET # BLD: 515 K/CU MM (ref 140–440)
PMV BLD AUTO: 9.1 FL (ref 7.5–11.1)
POTASSIUM SERPL-SCNC: 4.7 MMOL/L (ref 3.5–5.1)
RBC # BLD: 3.36 M/CU MM (ref 4.6–6.2)
SEGMENTED NEUTROPHILS ABSOLUTE COUNT: 3.7 K/CU MM
SEGMENTED NEUTROPHILS RELATIVE PERCENT: 57.9 % (ref 36–66)
SODIUM BLD-SCNC: 137 MMOL/L (ref 135–145)
TOTAL IMMATURE NEUTOROPHIL: 0.02 K/CU MM
TOTAL NUCLEATED RBC: 0 K/CU MM
TOTAL PROTEIN: 7.3 GM/DL (ref 6.4–8.2)
WBC # BLD: 6.3 K/CU MM (ref 4–10.5)

## 2022-05-17 PROCEDURE — 80053 COMPREHEN METABOLIC PANEL: CPT

## 2022-05-17 PROCEDURE — 86140 C-REACTIVE PROTEIN: CPT

## 2022-05-17 PROCEDURE — 85025 COMPLETE CBC W/AUTO DIFF WBC: CPT

## 2022-05-17 PROCEDURE — 85652 RBC SED RATE AUTOMATED: CPT

## 2022-05-18 ENCOUNTER — HOSPITAL ENCOUNTER (OUTPATIENT)
Dept: WOUND CARE | Age: 42
Discharge: HOME | End: 2022-05-18
Payer: COMMERCIAL

## 2022-05-18 VITALS
HEART RATE: 80 BPM | TEMPERATURE: 99.2 F | DIASTOLIC BLOOD PRESSURE: 83 MMHG | RESPIRATION RATE: 16 BRPM | SYSTOLIC BLOOD PRESSURE: 142 MMHG

## 2022-05-18 PROCEDURE — 11042 DBRDMT SUBQ TIS 1ST 20SQCM/<: CPT

## 2022-05-18 PROCEDURE — 11045 DBRDMT SUBQ TISS EACH ADDL: CPT

## 2022-05-18 NOTE — PROGRESS NOTES
[facility] Progress Note With Procedure    Anibal Bernheim  AGE: 39 y.o. GENDER: male  : 1980  EPISODE DATE:  2022     Subjective:     Chief Complaint   Patient presents with    Wound Check     efe feet         HISTORY of PRESENT ILLNESS      Anibal Bernheim is a 39 y.o. male who presents today for wound evaluation of Chronic diabetic and non-healing surgical ulcer(s) of the left foot. The ulcer is of marked severity. The underlying cause of the wound is diabetes. Stable. Still waiting for wound VAC.   Wound Pain Timing/Severity: none  Quality of pain: N/A  Severity of pain:  0 / 10   Modifying Factors: diabetes and shear force  Associated Signs/Symptoms: edema and drainage        PAST MEDICAL HISTORY        Diagnosis Date    Abscess of left foot 2022    Anemia associated with acute blood loss 2022    Callus of foot     Right foot - see's Dr. Perdue Care    Cellulitis of left foot 2022    Diabetic ulcer of left midfoot associated with type 2 diabetes mellitus, with muscle involvement without evidence of necrosis ([address] 2022    Diabetic ulcer of left midfoot associated with type 2 diabetes mellitus, with necrosis of muscle ([address] 2021    Diabetic ulcer of right midfoot associated with type 2 diabetes mellitus, with fat layer exposed ([address] 2020    Dizziness     positional    Essential hypertension     Follows with PCP    Hyperlipidemia     Lumbar radiculopathy     Septic embolism ([address] 2020    Subacute osteomyelitis of left foot ([address] 2022       PAST SURGICAL HISTORY    Past Surgical History:   Procedure Laterality Date    ACHILLES TENDON SURGERY Right 2021    RIGHT ACHILLES TENDON LENGTHENING REPAIR performed by Michelle Rodríguez DPM at [facility]  2018    [facility]    DENTAL SURGERY      teeth extractions -half per patient    HERNIA REPAIR Bilateral 2020    BIALTERAL HERNIA INGUINAL REPAIR performed by Marv Rosa MD at [address] [place] [address]  2018    [place] OFFICE/OUTPT VISIT,PROCEDURE ONLY Left 4/17/2018    L5-S1 HEMILAMINECTOMY, REMOVAL OF DISC LEFT SIDE performed by Patt Heard MD at [address] Right 1/8/2021    RIGHT TRANSMETATARSAL TOE AMPUTATION performed by Deepali Calhoun DPM at [facility] TOE AMPUTATION Left 4/23/2022    LEFT RAY GREAT TOE AMPUTATION performed by Afua Sutherland MD at [facility]         FAMILY HISTORY    Family History   Problem Relation Age of Onset    Heart Disease Father     Diabetes Father     Diabetes Mother     Heart Disease Mother     Other Mother     Kidney Disease Mother     Heart Attack Mother        SOCIAL HISTORY    Social History     Tobacco Use    Smoking status: Never Smoker    Smokeless tobacco: Never Used   Vaping Use    Vaping Use: Never used   Substance Use Topics    Alcohol use: Yes     Comment: \"couple beers a night\"    Drug use: No       ALLERGIES    No Known Allergies    MEDICATIONS    Current Outpatient Medications on File Prior to Encounter   Medication Sig Dispense Refill    collagenase 250 UNIT/GM ointment Apply topically daily.  90 g 1    Alcohol Swabs PADS       insulin glargine (LANTUS SOLOSTAR) 100 UNIT/ML injection pen Inject 24 Units into the skin nightly 3 pen 2    glyBURIDE (DIABETA) 5 MG tablet Take 2 tablets by mouth 2 times daily (with meals) 360 tablet 1    metFORMIN (GLUCOPHAGE) 500 MG tablet Take 2 tablets by mouth 2 times daily (with meals) Indications: Start tomorrow 03/14 360 tablet 1    pioglitazone (ACTOS) 30 MG tablet Take 1 tablet by mouth daily 90 tablet 1    atorvastatin (LIPITOR) 40 MG tablet Take 1 tablet by mouth nightly 90 tablet 1    losartan (COZAAR) 25 MG tablet Take 1 tablet by mouth daily 30 tablet 5    metoprolol succinate (TOPROL XL) 25 MG extended release tablet Take 1 tablet by mouth daily 90 tablet 1    blood glucose monitor strips Test 2 times a day & as needed for symptoms of irregular blood glucose. Dispense sufficient amount for indicated testing frequency plus additional to accommodate PRN testing needs. 100 strip 0    omeprazole (PRILOSEC) 20 MG delayed release capsule Take 1 capsule by mouth once daily in the morning 90 capsule 1    escitalopram (LEXAPRO) 10 MG tablet Take 1 tablet by mouth daily 30 tablet 5    aspirin 81 MG tablet Take 1 tablet by mouth daily 30 tablet 3    ceFAZolin (ANCEF) 1 g injection Infuse 2,000 mg intravenously every 8 hours 180 each 1    vitamin B-12 2000 MCG tablet Take 1 tablet by mouth daily 30 tablet 3    blood glucose monitor kit and supplies Dispense sufficient amount for indicated testing frequency plus additional to accommodate PRN testing needs. Dispense all needed supplies to include: monitor, strips, lancing device, lancets, control solutions, alcohol swabs. 1 kit 0    FreeStyle Lancets MISC 1 each by Does not apply route daily 100 each 3    acetaminophen (TYLENOL) 325 MG tablet Take 2 tablets by mouth every 4 hours as needed for Pain or Fever 120 tablet 3     No current facility-administered medications on file prior to encounter. REVIEW OF SYSTEMS    Pertinent items are noted in HPI. Constitutional: Negative for systemic symptoms including fever, chills and malaise. Objective:      BP (!) 142/83   Pulse 80   Temp 99.2 °F (37.3 °C) (Temporal)   Resp 16     PHYSICAL EXAM      General: The patient is in no acute distress. Mental status:  Patient is appropriate, is  oriented to place and plan of care.   Dermatologic exam: Visual inspection of the periwound reveals the skin to be moist  Wound exam: see wound description below in procedure note      Assessment:     Problem List Items Addressed This Visit     None        Procedure Note    Indications:  Based on my examination of this patient's wound(s) today, sharp excision into necrotic subcutaneous tissue is required to promote healing and evaluate the extent of previous healing. Performed by: Raymond Bernard MD    Consent obtained: Yes    Time out taken:  Yes    Pain Control:       Debridement:Excisional Debridement    Using # 10 blade scalpel the wound(s) was/were sharply debrided down through and including the removal of subcutaneous tissue. Devitalized Tissue Debrided:  slough and exudate    Pre Debridement Measurements:  Are located in the Wound Documentation Flow Sheet    All active wounds listed below with today's date are evaluated  Wound(s)    debrided this date include # : 1 and 2     Post  Debridement Measurements:  Negative Pressure Wound Therapy Foot Left (Active)   Wound Type Surgical 04/25/22 0915   Unit Type kci 04/25/22 0915   Dressing Type Black Foam;White Foam 04/25/22 0915   Cycle Continuous 04/25/22 0915   Target Pressure (mmHg) 125 04/25/22 0915   Canister changed?  Yes 04/25/22 0915   Dressing Status New dressing applied 04/25/22 0915   Dressing Changed Changed/New 04/25/22 0915   Drainage Amount Large 04/25/22 0915   Drainage Description Sanguinous 04/25/22 0915   Dressing Change Due 04/27/22 04/25/22 0915   Wound Assessment Red;Pink 04/25/22 0915   Odor None 04/25/22 0915   Number of days: 22       Incision 04/17/18 Back (Active)   Number of days: 5794       Wound 06/13/20 Tibial Right pt states reddened and rash like after a sunburn 6/11/2020 (Active)   Number of days: 703       Wound 05/11/22 #1 (onset 2 weeks) Left Medial Foot Amp Site (Active)   Wound Image   05/11/22 1027   Wound Etiology Surgical 05/18/22 1027   Wound Cleansed Wound cleanser 05/18/22 1027   Offloading for Diabetic Foot Ulcers Walker 05/11/22 1027   Wound Length (cm) 9.7 cm 05/18/22 1027   Wound Width (cm) 4.2 cm 05/18/22 1027   Wound Depth (cm) 0.5 cm 05/18/22 1027   Wound Surface Area (cm^2) 40.74 cm^2 05/18/22 1027   Change in Wound Size % (l*w) 20.82 05/18/22 1027   Wound Volume (cm^3) 20.37 cm^3 05/18/22 1027 Wound Healing % 21 05/18/22 1027   Post-Procedure Length (cm) 9.7 cm 05/18/22 1100   Post-Procedure Width (cm) 4.2 cm 05/18/22 1100   Post-Procedure Depth (cm) 0.5 cm 05/18/22 1100   Post-Procedure Surface Area (cm^2) 40.74 cm^2 05/18/22 1100   Post-Procedure Volume (cm^3) 20.37 cm^3 05/18/22 1100   Distance Tunneling (cm) 0 cm 05/18/22 1027   Tunneling Position ___ O'Clock 0 05/18/22 1027   Undermining Starts ___ O'Clock 0 05/18/22 1027   Undermining Ends___ O'Clock 0 05/18/22 1027   Undermining Maxium Distance (cm) 0 05/18/22 1027   Wound Assessment Abrams/red;Slough 05/18/22 1027   Drainage Amount Moderate 05/18/22 1027   Drainage Description Serosanguinous 05/18/22 1027   Odor None 05/18/22 1027   Rosalie-wound Assessment Fragile 05/18/22 1027   Margins Defined edges 05/18/22 1027   Wound Thickness Description not for Pressure Injury Full thickness 05/18/22 1027   Number of days: 7       Wound 05/11/22 #2 (onset unknown) Left Plantar (Active)   Wound Image   05/11/22 1027   Wound Cleansed Wound cleanser 05/18/22 1027   Offloading for Diabetic Foot Ulcers Walker 05/18/22 1027   Wound Length (cm) 1.5 cm 05/18/22 1027   Wound Width (cm) 1.5 cm 05/18/22 1027   Wound Depth (cm) 0.1 cm 05/18/22 1027   Wound Surface Area (cm^2) 2.25 cm^2 05/18/22 1027   Change in Wound Size % (l*w) -40.62 05/18/22 1027   Wound Volume (cm^3) 0.225 cm^3 05/18/22 1027   Wound Healing % 30 05/18/22 1027   Post-Procedure Length (cm) 1.5 cm 05/18/22 1100   Post-Procedure Width (cm) 1.5 cm 05/18/22 1100   Post-Procedure Depth (cm) 0.1 cm 05/18/22 1100   Post-Procedure Surface Area (cm^2) 2.25 cm^2 05/18/22 1100   Post-Procedure Volume (cm^3) 0.225 cm^3 05/18/22 1100   Distance Tunneling (cm) 0 cm 05/18/22 1027   Tunneling Position ___ O'Clock 0 05/18/22 1027   Undermining Starts ___ O'Clock 0 05/18/22 1027   Undermining Ends___ O'Clock 0 05/18/22 1027   Undermining Maxium Distance (cm) 00 05/18/22 1027   Wound Assessment Abrams/red;Slough 05/18/22 1027   Drainage Amount Moderate 05/18/22 1027   Drainage Description Yellow 05/18/22 1027   Odor None 05/18/22 1027   Rosalie-wound Assessment Fragile 05/18/22 1027   Margins Defined edges 05/18/22 1027   Wound Thickness Description not for Pressure Injury Full thickness 05/18/22 1027   Number of days: 7       Wound 05/11/22 #3 (onset unknown) Right Plantar (Active)   Wound Image   05/11/22 1027   Wound Cleansed Wound cleanser 05/18/22 1027   Offloading for Diabetic Foot Ulcers Walker 05/18/22 1027   Wound Length (cm) 1 cm 05/18/22 1027   Wound Width (cm) 0.2 cm 05/18/22 1027   Wound Depth (cm) 0.3 cm 05/18/22 1027   Wound Surface Area (cm^2) 0.2 cm^2 05/18/22 1027   Change in Wound Size % (l*w) 85.19 05/18/22 1027   Wound Volume (cm^3) 0.06 cm^3 05/18/22 1027   Wound Healing % 89 05/18/22 1027   Post-Procedure Length (cm) 1 cm 05/18/22 1100   Post-Procedure Width (cm) 0.2 cm 05/18/22 1100   Post-Procedure Depth (cm) 0.3 cm 05/18/22 1100   Post-Procedure Surface Area (cm^2) 0.2 cm^2 05/18/22 1100   Post-Procedure Volume (cm^3) 0.06 cm^3 05/18/22 1100   Distance Tunneling (cm) 0 cm 05/18/22 1027   Tunneling Position ___ O'Clock 0 05/18/22 1027   Undermining Starts ___ O'Clock 0 05/18/22 1027   Undermining Ends___ O'Clock 0 05/18/22 1027   Undermining Maxium Distance (cm) 0 05/18/22 1027   Wound Assessment Pink/red 05/18/22 1027   Drainage Amount Moderate 05/18/22 1027   Drainage Description Serosanguinous 05/18/22 1027   Odor None 05/18/22 1027   Rosalie-wound Assessment Fragile 05/11/22 1027   Margins Defined edges; Unattached edges 05/11/22 1027   Wound Thickness Description not for Pressure Injury Full thickness 05/11/22 1027   Number of days: 7       Percent of Wound(s) Debrided: approximately 100%    Total  Area  Debrided:  42 sq cm     Bleeding:  Minimal    Hemostasis Achieved:  not needed    Procedural Pain:  2  / 10     Post Procedural Pain:  0 / 10     Response to treatment:  Well tolerated by patient.      Status of wound progress and description from last visit:   Stable.       Plan:           Treatment Note      Written Patient Dismissal Instructions Given            Electronically signed by Khadar Palomo MD on 5/18/2022 at 11:17 AM

## 2022-05-20 ENCOUNTER — HOSPITAL ENCOUNTER (OUTPATIENT)
Age: 42
Setting detail: OBSERVATION
Discharge: HOME HEALTH | End: 2022-05-21
Attending: EMERGENCY MEDICINE | Admitting: STUDENT IN AN ORGANIZED HEALTH CARE EDUCATION/TRAINING PROGRAM
Payer: COMMERCIAL

## 2022-05-20 DIAGNOSIS — F43.21 GRIEVING: ICD-10-CM

## 2022-05-20 DIAGNOSIS — Z87.39 HISTORY OF OSTEOMYELITIS: ICD-10-CM

## 2022-05-20 DIAGNOSIS — R07.9 CHEST PAIN, UNSPECIFIED TYPE: Primary | ICD-10-CM

## 2022-05-20 DIAGNOSIS — R09.02 HYPOXIA: ICD-10-CM

## 2022-05-20 DIAGNOSIS — Z78.9 ALCOHOL USE: ICD-10-CM

## 2022-05-20 DIAGNOSIS — D64.9 CHRONIC ANEMIA: ICD-10-CM

## 2022-05-20 LAB
ACETAMINOPHEN LEVEL: <5 UG/ML (ref 15–30)
ALCOHOL SCREEN SERUM: 0.13 %WT/VOL
APTT: 27.9 SECONDS (ref 25.1–37.1)
BASE EXCESS: 4 (ref 0–3.3)
D DIMER: 369 NG/ML(DDU)
DOSE AMOUNT: ABNORMAL
DOSE AMOUNT: ABNORMAL
DOSE TIME: ABNORMAL
DOSE TIME: ABNORMAL
HCO3 VENOUS: 20.9 MMOL/L (ref 19–25)
INR BLD: 0.89 INDEX
LACTIC ACID, SEPSIS: 2.4 MMOL/L (ref 0.5–1.9)
O2 SAT, VEN: 95.2 % (ref 50–70)
PCO2, VEN: 37 MMHG (ref 38–52)
PH VENOUS: 7.36 (ref 7.32–7.42)
PO2, VEN: 115 MMHG (ref 28–48)
PRO-BNP: 230.5 PG/ML
PROTHROMBIN TIME: 11.5 SECONDS (ref 11.7–14.5)
SALICYLATE LEVEL: <0.3 MG/DL (ref 15–30)

## 2022-05-20 PROCEDURE — 83880 ASSAY OF NATRIURETIC PEPTIDE: CPT

## 2022-05-20 PROCEDURE — 83605 ASSAY OF LACTIC ACID: CPT

## 2022-05-20 PROCEDURE — 96361 HYDRATE IV INFUSION ADD-ON: CPT

## 2022-05-20 PROCEDURE — 85730 THROMBOPLASTIN TIME PARTIAL: CPT

## 2022-05-20 PROCEDURE — 85610 PROTHROMBIN TIME: CPT

## 2022-05-20 PROCEDURE — 6360000002 HC RX W HCPCS

## 2022-05-20 PROCEDURE — 99285 EMERGENCY DEPT VISIT HI MDM: CPT

## 2022-05-20 PROCEDURE — 93005 ELECTROCARDIOGRAM TRACING: CPT | Performed by: EMERGENCY MEDICINE

## 2022-05-20 PROCEDURE — 96374 THER/PROPH/DIAG INJ IV PUSH: CPT

## 2022-05-20 PROCEDURE — 85379 FIBRIN DEGRADATION QUANT: CPT

## 2022-05-20 PROCEDURE — G0480 DRUG TEST DEF 1-7 CLASSES: HCPCS

## 2022-05-20 PROCEDURE — 85025 COMPLETE CBC W/AUTO DIFF WBC: CPT

## 2022-05-20 PROCEDURE — 82805 BLOOD GASES W/O2 SATURATION: CPT

## 2022-05-20 PROCEDURE — 83735 ASSAY OF MAGNESIUM: CPT

## 2022-05-20 PROCEDURE — 84484 ASSAY OF TROPONIN QUANT: CPT

## 2022-05-20 PROCEDURE — 82140 ASSAY OF AMMONIA: CPT

## 2022-05-20 PROCEDURE — 96375 TX/PRO/DX INJ NEW DRUG ADDON: CPT

## 2022-05-20 PROCEDURE — 2580000003 HC RX 258: Performed by: EMERGENCY MEDICINE

## 2022-05-20 PROCEDURE — 80053 COMPREHEN METABOLIC PANEL: CPT

## 2022-05-20 RX ORDER — NALOXONE HYDROCHLORIDE 1 MG/ML
2 INJECTION INTRAMUSCULAR; INTRAVENOUS; SUBCUTANEOUS ONCE
Status: COMPLETED | OUTPATIENT
Start: 2022-05-20 | End: 2022-05-20

## 2022-05-20 RX ORDER — NALOXONE HYDROCHLORIDE 1 MG/ML
INJECTION INTRAMUSCULAR; INTRAVENOUS; SUBCUTANEOUS
Status: COMPLETED
Start: 2022-05-20 | End: 2022-05-20

## 2022-05-20 RX ORDER — 0.9 % SODIUM CHLORIDE 0.9 %
1000 INTRAVENOUS SOLUTION INTRAVENOUS ONCE
Status: COMPLETED | OUTPATIENT
Start: 2022-05-20 | End: 2022-05-21

## 2022-05-20 RX ADMIN — SODIUM CHLORIDE 1000 ML: 9 INJECTION, SOLUTION INTRAVENOUS at 23:12

## 2022-05-20 RX ADMIN — NALOXONE HYDROCHLORIDE 2 MG: 1 INJECTION PARENTERAL at 23:19

## 2022-05-20 RX ADMIN — NALOXONE HYDROCHLORIDE 2 MG: 1 INJECTION INTRAMUSCULAR; INTRAVENOUS; SUBCUTANEOUS at 23:19

## 2022-05-21 ENCOUNTER — APPOINTMENT (OUTPATIENT)
Dept: CT IMAGING | Age: 42
End: 2022-05-21
Payer: COMMERCIAL

## 2022-05-21 VITALS
BODY MASS INDEX: 25.44 KG/M2 | DIASTOLIC BLOOD PRESSURE: 101 MMHG | HEIGHT: 72 IN | WEIGHT: 187.8 LBS | SYSTOLIC BLOOD PRESSURE: 146 MMHG | HEART RATE: 78 BPM | RESPIRATION RATE: 18 BRPM | OXYGEN SATURATION: 100 % | TEMPERATURE: 98.2 F

## 2022-05-21 PROBLEM — F41.0 ANXIETY ATTACK: Status: ACTIVE | Noted: 2022-05-21

## 2022-05-21 LAB
ALBUMIN SERPL-MCNC: 3.7 GM/DL (ref 3.4–5)
ALP BLD-CCNC: 104 IU/L (ref 40–129)
ALT SERPL-CCNC: 8 U/L (ref 10–40)
AMMONIA: 30 UMOL/L (ref 16–60)
AMPHETAMINES: NEGATIVE
ANION GAP SERPL CALCULATED.3IONS-SCNC: 11 MMOL/L (ref 4–16)
ANION GAP SERPL CALCULATED.3IONS-SCNC: 14 MMOL/L (ref 4–16)
AST SERPL-CCNC: 15 IU/L (ref 15–37)
BACTERIA: NEGATIVE /HPF
BARBITURATE SCREEN URINE: NEGATIVE
BASOPHILS ABSOLUTE: 0 K/CU MM
BASOPHILS RELATIVE PERCENT: 0.4 % (ref 0–1)
BENZODIAZEPINE SCREEN, URINE: NEGATIVE
BILIRUB SERPL-MCNC: 0.2 MG/DL (ref 0–1)
BILIRUBIN URINE: NEGATIVE MG/DL
BLOOD, URINE: ABNORMAL
BUN BLDV-MCNC: 8 MG/DL (ref 6–23)
BUN BLDV-MCNC: 9 MG/DL (ref 6–23)
CALCIUM SERPL-MCNC: 8.6 MG/DL (ref 8.3–10.6)
CALCIUM SERPL-MCNC: 8.7 MG/DL (ref 8.3–10.6)
CANNABINOID SCREEN URINE: NEGATIVE
CHLORIDE BLD-SCNC: 105 MMOL/L (ref 99–110)
CHLORIDE BLD-SCNC: 99 MMOL/L (ref 99–110)
CLARITY: CLEAR
CO2: 19 MMOL/L (ref 21–32)
CO2: 21 MMOL/L (ref 21–32)
COCAINE METABOLITE: NEGATIVE
COLOR: COLORLESS
CREAT SERPL-MCNC: 0.7 MG/DL (ref 0.9–1.3)
CREAT SERPL-MCNC: 0.9 MG/DL (ref 0.9–1.3)
DIFFERENTIAL TYPE: ABNORMAL
EKG ATRIAL RATE: 112 BPM
EKG ATRIAL RATE: 84 BPM
EKG DIAGNOSIS: NORMAL
EKG DIAGNOSIS: NORMAL
EKG P AXIS: 50 DEGREES
EKG P AXIS: 61 DEGREES
EKG P-R INTERVAL: 138 MS
EKG P-R INTERVAL: 166 MS
EKG Q-T INTERVAL: 338 MS
EKG Q-T INTERVAL: 378 MS
EKG QRS DURATION: 102 MS
EKG QRS DURATION: 92 MS
EKG QTC CALCULATION (BAZETT): 446 MS
EKG QTC CALCULATION (BAZETT): 461 MS
EKG R AXIS: 29 DEGREES
EKG R AXIS: 29 DEGREES
EKG T AXIS: 0 DEGREES
EKG T AXIS: 4 DEGREES
EKG VENTRICULAR RATE: 112 BPM
EKG VENTRICULAR RATE: 84 BPM
EOSINOPHILS ABSOLUTE: 0.2 K/CU MM
EOSINOPHILS RELATIVE PERCENT: 2.3 % (ref 0–3)
FOLATE: 13.1 NG/ML (ref 3.1–17.5)
GFR AFRICAN AMERICAN: >60 ML/MIN/1.73M2
GFR AFRICAN AMERICAN: >60 ML/MIN/1.73M2
GFR NON-AFRICAN AMERICAN: >60 ML/MIN/1.73M2
GFR NON-AFRICAN AMERICAN: >60 ML/MIN/1.73M2
GLUCOSE BLD-MCNC: 129 MG/DL (ref 70–99)
GLUCOSE BLD-MCNC: 181 MG/DL (ref 70–99)
GLUCOSE BLD-MCNC: 194 MG/DL (ref 70–99)
GLUCOSE BLD-MCNC: 218 MG/DL (ref 70–99)
GLUCOSE, URINE: NEGATIVE MG/DL
HCT VFR BLD CALC: 24.1 % (ref 42–52)
HCT VFR BLD CALC: 31.3 % (ref 42–52)
HEMOGLOBIN: 7.4 GM/DL (ref 13.5–18)
HEMOGLOBIN: 9.6 GM/DL (ref 13.5–18)
IMMATURE NEUTROPHIL %: 0.5 % (ref 0–0.43)
KETONES, URINE: NEGATIVE MG/DL
LACTATE: 0.7 MMOL/L (ref 0.4–2)
LEUKOCYTE ESTERASE, URINE: NEGATIVE
LYMPHOCYTES ABSOLUTE: 2.1 K/CU MM
LYMPHOCYTES RELATIVE PERCENT: 27.2 % (ref 24–44)
MAGNESIUM: 1.9 MG/DL (ref 1.8–2.4)
MCH RBC QN AUTO: 29.7 PG (ref 27–31)
MCH RBC QN AUTO: 29.7 PG (ref 27–31)
MCHC RBC AUTO-ENTMCNC: 30.7 % (ref 32–36)
MCHC RBC AUTO-ENTMCNC: 30.7 % (ref 32–36)
MCV RBC AUTO: 96.8 FL (ref 78–100)
MCV RBC AUTO: 96.9 FL (ref 78–100)
MONOCYTES ABSOLUTE: 0.6 K/CU MM
MONOCYTES RELATIVE PERCENT: 8 % (ref 0–4)
NITRITE URINE, QUANTITATIVE: NEGATIVE
NUCLEATED RBC %: 0 %
OPIATES, URINE: NEGATIVE
OXYCODONE: NEGATIVE
PDW BLD-RTO: 14.6 % (ref 11.7–14.9)
PDW BLD-RTO: 14.8 % (ref 11.7–14.9)
PH, URINE: 6 (ref 5–8)
PHENCYCLIDINE, URINE: NEGATIVE
PLATELET # BLD: 389 K/CU MM (ref 140–440)
PLATELET # BLD: 475 K/CU MM (ref 140–440)
PMV BLD AUTO: 8.6 FL (ref 7.5–11.1)
PMV BLD AUTO: 9 FL (ref 7.5–11.1)
POTASSIUM SERPL-SCNC: 3.4 MMOL/L (ref 3.5–5.1)
POTASSIUM SERPL-SCNC: 4.2 MMOL/L (ref 3.5–5.1)
PROTEIN UA: NEGATIVE MG/DL
RBC # BLD: 2.49 M/CU MM (ref 4.6–6.2)
RBC # BLD: 3.23 M/CU MM (ref 4.6–6.2)
RBC URINE: <1 /HPF (ref 0–3)
SARS-COV-2, NAAT: NOT DETECTED
SEGMENTED NEUTROPHILS ABSOLUTE COUNT: 4.8 K/CU MM
SEGMENTED NEUTROPHILS RELATIVE PERCENT: 61.6 % (ref 36–66)
SODIUM BLD-SCNC: 132 MMOL/L (ref 135–145)
SODIUM BLD-SCNC: 137 MMOL/L (ref 135–145)
SOURCE: NORMAL
SPECIFIC GRAVITY UA: <1.005 (ref 1–1.03)
TOTAL IMMATURE NEUTOROPHIL: 0.04 K/CU MM
TOTAL NUCLEATED RBC: 0 K/CU MM
TOTAL PROTEIN: 8.2 GM/DL (ref 6.4–8.2)
TROPONIN T: <0.01 NG/ML
UROBILINOGEN, URINE: 0.2 MG/DL (ref 0.2–1)
VITAMIN B-12: 226.5 PG/ML (ref 211–911)
WBC # BLD: 6.9 K/CU MM (ref 4–10.5)
WBC # BLD: 7.7 K/CU MM (ref 4–10.5)
WBC UA: <1 /HPF (ref 0–2)

## 2022-05-21 PROCEDURE — 86900 BLOOD TYPING SEROLOGIC ABO: CPT

## 2022-05-21 PROCEDURE — 6360000002 HC RX W HCPCS: Performed by: NURSE PRACTITIONER

## 2022-05-21 PROCEDURE — 71275 CT ANGIOGRAPHY CHEST: CPT

## 2022-05-21 PROCEDURE — 84484 ASSAY OF TROPONIN QUANT: CPT

## 2022-05-21 PROCEDURE — G0378 HOSPITAL OBSERVATION PER HR: HCPCS

## 2022-05-21 PROCEDURE — 96365 THER/PROPH/DIAG IV INF INIT: CPT

## 2022-05-21 PROCEDURE — 2580000003 HC RX 258: Performed by: NURSE PRACTITIONER

## 2022-05-21 PROCEDURE — 93005 ELECTROCARDIOGRAM TRACING: CPT | Performed by: NURSE PRACTITIONER

## 2022-05-21 PROCEDURE — 86850 RBC ANTIBODY SCREEN: CPT

## 2022-05-21 PROCEDURE — 85027 COMPLETE CBC AUTOMATED: CPT

## 2022-05-21 PROCEDURE — 6360000002 HC RX W HCPCS: Performed by: INTERNAL MEDICINE

## 2022-05-21 PROCEDURE — 82746 ASSAY OF FOLIC ACID SERUM: CPT

## 2022-05-21 PROCEDURE — 86901 BLOOD TYPING SEROLOGIC RH(D): CPT

## 2022-05-21 PROCEDURE — 6360000004 HC RX CONTRAST MEDICATION: Performed by: EMERGENCY MEDICINE

## 2022-05-21 PROCEDURE — 82962 GLUCOSE BLOOD TEST: CPT

## 2022-05-21 PROCEDURE — 80307 DRUG TEST PRSMV CHEM ANLYZR: CPT

## 2022-05-21 PROCEDURE — 99213 OFFICE O/P EST LOW 20 MIN: CPT | Performed by: INTERNAL MEDICINE

## 2022-05-21 PROCEDURE — 93010 ELECTROCARDIOGRAM REPORT: CPT | Performed by: INTERNAL MEDICINE

## 2022-05-21 PROCEDURE — 86922 COMPATIBILITY TEST ANTIGLOB: CPT

## 2022-05-21 PROCEDURE — 80048 BASIC METABOLIC PNL TOTAL CA: CPT

## 2022-05-21 PROCEDURE — 82607 VITAMIN B-12: CPT

## 2022-05-21 PROCEDURE — 96372 THER/PROPH/DIAG INJ SC/IM: CPT

## 2022-05-21 PROCEDURE — 83605 ASSAY OF LACTIC ACID: CPT

## 2022-05-21 PROCEDURE — 87040 BLOOD CULTURE FOR BACTERIA: CPT

## 2022-05-21 PROCEDURE — 6370000000 HC RX 637 (ALT 250 FOR IP): Performed by: NURSE PRACTITIONER

## 2022-05-21 PROCEDURE — 81001 URINALYSIS AUTO W/SCOPE: CPT

## 2022-05-21 PROCEDURE — 94761 N-INVAS EAR/PLS OXIMETRY MLT: CPT

## 2022-05-21 PROCEDURE — 96375 TX/PRO/DX INJ NEW DRUG ADDON: CPT

## 2022-05-21 PROCEDURE — 87635 SARS-COV-2 COVID-19 AMP PRB: CPT

## 2022-05-21 RX ORDER — INSULIN GLARGINE 100 [IU]/ML
25 INJECTION, SOLUTION SUBCUTANEOUS NIGHTLY
Status: DISCONTINUED | OUTPATIENT
Start: 2022-05-21 | End: 2022-05-21

## 2022-05-21 RX ORDER — PANTOPRAZOLE SODIUM 40 MG/1
40 TABLET, DELAYED RELEASE ORAL
Status: DISCONTINUED | OUTPATIENT
Start: 2022-05-21 | End: 2022-05-21 | Stop reason: HOSPADM

## 2022-05-21 RX ORDER — ACETAMINOPHEN 325 MG/1
650 TABLET ORAL EVERY 6 HOURS PRN
Status: DISCONTINUED | OUTPATIENT
Start: 2022-05-21 | End: 2022-05-21 | Stop reason: HOSPADM

## 2022-05-21 RX ORDER — ACETAMINOPHEN 650 MG/1
650 SUPPOSITORY RECTAL EVERY 6 HOURS PRN
Status: DISCONTINUED | OUTPATIENT
Start: 2022-05-21 | End: 2022-05-21 | Stop reason: HOSPADM

## 2022-05-21 RX ORDER — SODIUM CHLORIDE 9 MG/ML
INJECTION, SOLUTION INTRAVENOUS PRN
Status: DISCONTINUED | OUTPATIENT
Start: 2022-05-21 | End: 2022-05-21 | Stop reason: HOSPADM

## 2022-05-21 RX ORDER — INSULIN LISPRO 100 [IU]/ML
0-6 INJECTION, SOLUTION INTRAVENOUS; SUBCUTANEOUS NIGHTLY
Status: DISCONTINUED | OUTPATIENT
Start: 2022-05-21 | End: 2022-05-21 | Stop reason: HOSPADM

## 2022-05-21 RX ORDER — LORAZEPAM 2 MG/ML
1 INJECTION INTRAMUSCULAR ONCE
Status: COMPLETED | OUTPATIENT
Start: 2022-05-21 | End: 2022-05-21

## 2022-05-21 RX ORDER — LANOLIN ALCOHOL/MO/W.PET/CERES
2000 CREAM (GRAM) TOPICAL DAILY
Status: DISCONTINUED | OUTPATIENT
Start: 2022-05-21 | End: 2022-05-21 | Stop reason: HOSPADM

## 2022-05-21 RX ORDER — INSULIN GLARGINE 100 [IU]/ML
30 INJECTION, SOLUTION SUBCUTANEOUS NIGHTLY
Status: DISCONTINUED | OUTPATIENT
Start: 2022-05-21 | End: 2022-05-21 | Stop reason: HOSPADM

## 2022-05-21 RX ORDER — ONDANSETRON 2 MG/ML
4 INJECTION INTRAMUSCULAR; INTRAVENOUS EVERY 6 HOURS PRN
Status: DISCONTINUED | OUTPATIENT
Start: 2022-05-21 | End: 2022-05-21 | Stop reason: HOSPADM

## 2022-05-21 RX ORDER — INSULIN LISPRO 100 [IU]/ML
0-12 INJECTION, SOLUTION INTRAVENOUS; SUBCUTANEOUS
Status: DISCONTINUED | OUTPATIENT
Start: 2022-05-21 | End: 2022-05-21 | Stop reason: HOSPADM

## 2022-05-21 RX ORDER — LOSARTAN POTASSIUM 25 MG/1
25 TABLET ORAL DAILY
Status: DISCONTINUED | OUTPATIENT
Start: 2022-05-21 | End: 2022-05-21 | Stop reason: HOSPADM

## 2022-05-21 RX ORDER — SODIUM CHLORIDE 9 MG/ML
INJECTION, SOLUTION INTRAVENOUS CONTINUOUS
Status: DISCONTINUED | OUTPATIENT
Start: 2022-05-21 | End: 2022-05-21 | Stop reason: HOSPADM

## 2022-05-21 RX ORDER — SODIUM CHLORIDE 0.9 % (FLUSH) 0.9 %
5-40 SYRINGE (ML) INJECTION 2 TIMES DAILY
Status: DISCONTINUED | OUTPATIENT
Start: 2022-05-21 | End: 2022-05-21

## 2022-05-21 RX ORDER — NITROGLYCERIN 0.4 MG/1
0.4 TABLET SUBLINGUAL EVERY 5 MIN PRN
Status: DISCONTINUED | OUTPATIENT
Start: 2022-05-21 | End: 2022-05-21 | Stop reason: HOSPADM

## 2022-05-21 RX ORDER — ENOXAPARIN SODIUM 100 MG/ML
40 INJECTION SUBCUTANEOUS DAILY
Status: DISCONTINUED | OUTPATIENT
Start: 2022-05-21 | End: 2022-05-21 | Stop reason: HOSPADM

## 2022-05-21 RX ORDER — SODIUM CHLORIDE 0.9 % (FLUSH) 0.9 %
5-40 SYRINGE (ML) INJECTION EVERY 12 HOURS SCHEDULED
Status: DISCONTINUED | OUTPATIENT
Start: 2022-05-21 | End: 2022-05-21 | Stop reason: HOSPADM

## 2022-05-21 RX ORDER — POTASSIUM CHLORIDE 7.45 MG/ML
10 INJECTION INTRAVENOUS PRN
Status: DISCONTINUED | OUTPATIENT
Start: 2022-05-21 | End: 2022-05-21 | Stop reason: HOSPADM

## 2022-05-21 RX ORDER — POLYETHYLENE GLYCOL 3350 17 G/17G
17 POWDER, FOR SOLUTION ORAL DAILY PRN
Status: DISCONTINUED | OUTPATIENT
Start: 2022-05-21 | End: 2022-05-21 | Stop reason: HOSPADM

## 2022-05-21 RX ORDER — POTASSIUM CHLORIDE 29.8 MG/ML
20 INJECTION INTRAVENOUS PRN
Status: DISCONTINUED | OUTPATIENT
Start: 2022-05-21 | End: 2022-05-21 | Stop reason: HOSPADM

## 2022-05-21 RX ORDER — INSULIN GLARGINE 100 [IU]/ML
24 INJECTION, SOLUTION SUBCUTANEOUS NIGHTLY
Status: DISCONTINUED | OUTPATIENT
Start: 2022-05-21 | End: 2022-05-21

## 2022-05-21 RX ORDER — ESCITALOPRAM OXALATE 10 MG/1
10 TABLET ORAL DAILY
Status: DISCONTINUED | OUTPATIENT
Start: 2022-05-21 | End: 2022-05-21 | Stop reason: HOSPADM

## 2022-05-21 RX ORDER — ONDANSETRON 4 MG/1
4 TABLET, ORALLY DISINTEGRATING ORAL EVERY 8 HOURS PRN
Status: DISCONTINUED | OUTPATIENT
Start: 2022-05-21 | End: 2022-05-21 | Stop reason: HOSPADM

## 2022-05-21 RX ORDER — ASPIRIN 81 MG/1
81 TABLET, CHEWABLE ORAL DAILY
Status: DISCONTINUED | OUTPATIENT
Start: 2022-05-21 | End: 2022-05-21 | Stop reason: HOSPADM

## 2022-05-21 RX ORDER — ATORVASTATIN CALCIUM 40 MG/1
40 TABLET, FILM COATED ORAL NIGHTLY
Status: DISCONTINUED | OUTPATIENT
Start: 2022-05-21 | End: 2022-05-21 | Stop reason: HOSPADM

## 2022-05-21 RX ORDER — DEXTROSE MONOHYDRATE 50 MG/ML
100 INJECTION, SOLUTION INTRAVENOUS PRN
Status: DISCONTINUED | OUTPATIENT
Start: 2022-05-21 | End: 2022-05-21 | Stop reason: HOSPADM

## 2022-05-21 RX ORDER — SODIUM CHLORIDE 0.9 % (FLUSH) 0.9 %
5-40 SYRINGE (ML) INJECTION PRN
Status: DISCONTINUED | OUTPATIENT
Start: 2022-05-21 | End: 2022-05-21 | Stop reason: HOSPADM

## 2022-05-21 RX ORDER — METOPROLOL SUCCINATE 25 MG/1
25 TABLET, EXTENDED RELEASE ORAL DAILY
Status: DISCONTINUED | OUTPATIENT
Start: 2022-05-21 | End: 2022-05-21 | Stop reason: HOSPADM

## 2022-05-21 RX ORDER — LORAZEPAM 1 MG/1
1 TABLET ORAL EVERY 6 HOURS PRN
Status: DISCONTINUED | OUTPATIENT
Start: 2022-05-21 | End: 2022-05-21 | Stop reason: HOSPADM

## 2022-05-21 RX ORDER — SODIUM CHLORIDE 9 MG/ML
INJECTION, SOLUTION INTRAVENOUS PRN
Status: DISCONTINUED | OUTPATIENT
Start: 2022-05-21 | End: 2022-05-21

## 2022-05-21 RX ORDER — CEFAZOLIN SODIUM 1 G/3ML
2000 INJECTION, POWDER, FOR SOLUTION INTRAMUSCULAR; INTRAVENOUS EVERY 8 HOURS
Status: DISCONTINUED | OUTPATIENT
Start: 2022-05-21 | End: 2022-05-21

## 2022-05-21 RX ADMIN — SODIUM CHLORIDE: 9 INJECTION, SOLUTION INTRAVENOUS at 09:25

## 2022-05-21 RX ADMIN — ASPIRIN 81 MG 81 MG: 81 TABLET ORAL at 09:14

## 2022-05-21 RX ADMIN — METOPROLOL SUCCINATE 25 MG: 25 TABLET, EXTENDED RELEASE ORAL at 09:14

## 2022-05-21 RX ADMIN — LOSARTAN POTASSIUM 25 MG: 25 TABLET, FILM COATED ORAL at 09:14

## 2022-05-21 RX ADMIN — PANTOPRAZOLE SODIUM 40 MG: 40 TABLET, DELAYED RELEASE ORAL at 09:22

## 2022-05-21 RX ADMIN — ENOXAPARIN SODIUM 40 MG: 100 INJECTION SUBCUTANEOUS at 09:14

## 2022-05-21 RX ADMIN — IOPAMIDOL 75 ML: 755 INJECTION, SOLUTION INTRAVENOUS at 01:33

## 2022-05-21 RX ADMIN — DEXTROSE MONOHYDRATE 2000 MG: 50 INJECTION, SOLUTION INTRAVENOUS at 17:04

## 2022-05-21 RX ADMIN — LORAZEPAM 1 MG: 2 INJECTION INTRAMUSCULAR; INTRAVENOUS at 09:44

## 2022-05-21 RX ADMIN — INSULIN LISPRO 2 UNITS: 100 INJECTION, SOLUTION INTRAVENOUS; SUBCUTANEOUS at 09:48

## 2022-05-21 RX ADMIN — Medication 2000 MCG: at 09:14

## 2022-05-21 RX ADMIN — ESCITALOPRAM OXALATE 10 MG: 10 TABLET ORAL at 09:14

## 2022-05-21 RX ADMIN — DEXTROSE MONOHYDRATE 2000 MG: 50 INJECTION, SOLUTION INTRAVENOUS at 09:48

## 2022-05-21 NOTE — ED NOTES
Pt denies taking anything tonight, EMS sts there was a shelf of various pill bottles at pts home     [facility]  05/20/22 [id number]

## 2022-05-21 NOTE — CONSULTS
CARDIOLOGY CONSULT NOTE   Reason for consultation:  Chest Pain     Referring physician:  Tammi Nix DO     Primary care physician: Su Mishra MD      Dear  Dr. Tammi Nix DO   Thanks for the consult. Chief Complaints :  Chief Complaint   Patient presents with    Chest Pain     x30 mins, ASA and nitro given by EMS    Shortness of Breath        History of present illness:Julien is a 39 y. o.year old who presents with complains of severe chest pain which started after his mother passed away and lasted about 30 minutes and resolved now he EKG shows normal sinus rhythm he does have history of severe peripheral arterial disease s/p amputation wound VAC discharged 20 days ago complicated by anemia requiring blood transfusion card catheterization in March 2021 showed no significant obstructive disease    Past medical history:    has a past medical history of Abscess of left foot, Anemia associated with acute blood loss, Callus of foot, Cellulitis of left foot, Diabetic ulcer of left midfoot associated with type 2 diabetes mellitus, with muscle involvement without evidence of necrosis ([address] Diabetic ulcer of left midfoot associated with type 2 diabetes mellitus, with necrosis of muscle ([address] Diabetic ulcer of right midfoot associated with type 2 diabetes mellitus, with fat layer exposed ([address] Dizziness, Essential hypertension, Hyperlipidemia, Lumbar radiculopathy, Septic embolism ([address] and Subacute osteomyelitis of left foot ([address] Past surgical history:   has a past surgical history that includes Cardiac catheterization (03/2018); [place] tooth extraction; Dental surgery; pr office/outpt visit,procedure only (Left, 4/17/2018); lumbar laminectomy (2018); Toe amputation (Right, 1/8/2021); Achilles tendon surgery (Right, 1/8/2021); hernia repair (Bilateral, 5/27/2020); and Toe amputation (Left, 4/23/2022). Social History:   reports that he has never smoked.  He has never used smokeless tobacco. He reports current alcohol use. He reports that he does not use drugs.   Family history:   no family history of CAD, STROKE of DM at early age    No Known Allergies    aspirin chewable tablet 81 mg, Daily  atorvastatin (LIPITOR) tablet 40 mg, Nightly  escitalopram (LEXAPRO) tablet 10 mg, Daily  losartan (COZAAR) tablet 25 mg, Daily  metoprolol succinate (TOPROL XL) extended release tablet 25 mg, Daily  pantoprazole (PROTONIX) tablet 40 mg, QAM AC  vitamin B-12 (CYANOCOBALAMIN) tablet 2,000 mcg, Daily  insulin lispro (HUMALOG) injection vial 0-12 Units, TID WC  insulin lispro (HUMALOG) injection vial 0-6 Units, Nightly  sodium chloride flush 0.9 % injection 5-40 mL, 2 times per day  sodium chloride flush 0.9 % injection 5-40 mL, PRN  0.9 % sodium chloride infusion, PRN  ondansetron (ZOFRAN-ODT) disintegrating tablet 4 mg, Q8H PRN   Or  ondansetron (ZOFRAN) injection 4 mg, Q6H PRN  acetaminophen (TYLENOL) tablet 650 mg, Q6H PRN   Or  acetaminophen (TYLENOL) suppository 650 mg, Q6H PRN  polyethylene glycol (GLYCOLAX) packet 17 g, Daily PRN  enoxaparin (LOVENOX) injection 40 mg, Daily  nitroGLYCERIN (NITROSTAT) SL tablet 0.4 mg, Q5 Min PRN  ceFAZolin (ANCEF) 2000 mg in dextrose 5 % 100 mL IVPB, Q8H  potassium chloride 20 mEq/50 mL IVPB (Central Line), PRN   Or  potassium chloride 10 mEq/100 mL IVPB (Peripheral Line), PRN  0.9 % sodium chloride infusion, Continuous  LORazepam (ATIVAN) tablet 1 mg, Q6H PRN  glucose chewable tablet 16 g, PRN  dextrose bolus 10% 125 mL, PRN   Or  dextrose bolus 10% 250 mL, PRN  glucagon (rDNA) injection 1 mg, PRN  dextrose 5 % solution, PRN  insulin glargine (LANTUS) injection vial 30 Units, Nightly      Current Facility-Administered Medications   Medication Dose Route Frequency Provider Last Rate Last Admin    aspirin chewable tablet 81 mg  81 mg Oral Daily RAFAEL Solares - CNP   81 mg at 05/21/22 0914    atorvastatin (LIPITOR) tablet 40 mg  40 mg Oral Nightly Molly Hurtado, APRN - CNP        escitalopram (LEXAPRO) tablet 10 mg  10 mg Oral Daily Molly Cucolageorge, APRN - CNP   10 mg at 05/21/22 0914    losartan (COZAAR) tablet 25 mg  25 mg Oral Daily Molly Dapilah, APRN - CNP   25 mg at 05/21/22 0914    metoprolol succinate (TOPROL XL) extended release tablet 25 mg  25 mg Oral Daily Molly Dapilah, APRN - CNP   25 mg at 05/21/22 0914    pantoprazole (PROTONIX) tablet 40 mg  40 mg Oral QAM AC Molly Cucolageorge, APRN - CNP   40 mg at 05/21/22 0481    vitamin B-12 (CYANOCOBALAMIN) tablet 2,000 mcg  2,000 mcg Oral Daily Molly Genesis, APRN - CNP   2,000 mcg at 05/21/22 0914    insulin lispro (HUMALOG) injection vial 0-12 Units  0-12 Units SubCUTAneous TID WC Molly Hurtado, APRN - CNP   2 Units at 05/21/22 0948    insulin lispro (HUMALOG) injection vial 0-6 Units  0-6 Units SubCUTAneous Nightly Molly Hurtado, APRN - CNP        sodium chloride flush 0.9 % injection 5-40 mL  5-40 mL IntraVENous 2 times per day Estuardo Petit, APRN - CNP        sodium chloride flush 0.9 % injection 5-40 mL  5-40 mL IntraVENous PRN Molly Hurtado, APRN - CNP        0.9 % sodium chloride infusion   IntraVENous PRN Molly Hurtado, APRN - CNP        ondansetron (ZOFRAN-ODT) disintegrating tablet 4 mg  4 mg Oral Q8H PRN Molly Galarzah, APRN - CNP        Or    ondansetron (ZOFRAN) injection 4 mg  4 mg IntraVENous Q6H PRN Molly Dapilah, APRN - CNP        acetaminophen (TYLENOL) tablet 650 mg  650 mg Oral Q6H PRN Molly Geovannih, APRN - CNP        Or    acetaminophen (TYLENOL) suppository 650 mg  650 mg Rectal Q6H PRN Molly Dapilah, APRN - CNP        polyethylene glycol (GLYCOLAX) packet 17 g  17 g Oral Daily PRN Molly Dapilah, APRN - CNP        enoxaparin (LOVENOX) injection 40 mg  40 mg SubCUTAneous Daily MollyRAFAEL Ferris CNP   40 mg at 05/21/22 0914    nitroGLYCERIN (NITROSTAT) SL tablet 0.4 mg  0.4 mg SubLINGual Q5 Min PRN RAFAEL Solares CNP        ceFAZolin (ANCEF) 2000 mg in dextrose 5 % 100 mL IVPB  2,000 mg IntraVENous Q8H Molly Hurtado, APRN - CNP   Stopped at 05/21/22 1027    potassium chloride 20 mEq/50 mL IVPB (Central Line)  20 mEq IntraVENous PRN Dwayne Skiff, APRN - CNP        Or    potassium chloride 10 mEq/100 mL IVPB (Peripheral Line)  10 mEq IntraVENous PRN Molly Hurtado, APRN - CNP        0.9 % sodium chloride infusion   IntraVENous Continuous Raúl Ferris  mL/hr at 05/21/22 0925 New Bag at 05/21/22 0925    LORazepam (ATIVAN) tablet 1 mg  1 mg Oral Q6H PRN Raúl Ferris MD        glucose chewable tablet 16 g  4 tablet Oral PRN Raúl Ferris MD        dextrose bolus 10% 125 mL  125 mL IntraVENous PRN Raúl Ferris MD        Or    dextrose bolus 10% 250 mL  250 mL IntraVENous PRN Raúl Ferris MD        glucagon (rDNA) injection 1 mg  1 mg IntraMUSCular PRN Raúl Ferris MD        dextrose 5 % solution  100 mL/hr IntraVENous PRN Raúl Ferris MD        insulin glargine (LANTUS) injection vial 30 Units  30 Units SubCUTAneous Nightly Raúl Ferris MD         Review of Systems:   · Constitutional: No Fever or Weight Loss   · Eyes: No Decreased Vision  · ENT: No Headaches, Hearing Loss or Vertigo  · Cardiovascular: As per HPI  · Respiratory: As per HPI  · Gastrointestinal: No abdominal pain, appetite loss, blood in stools, constipation, diarrhea or heartburn  · Genitourinary: No dysuria, trouble voiding, or hematuria  · Musculoskeletal:  No gait disturbance, weakness or joint complaints  · Integumentary: No rash or pruritis  · Neurological: No TIA or stroke symptoms  · Psychiatric: No anxiety or depression  · Endocrine: No malaise, fatigue or temperature intolerance  · Hematologic/Lymphatic: No bleeding problems, blood clots or swollen lymph nodes  · Allergic/Immunologic: No nasal congestion or hives  All systems negative except as marked.         Physical Examination:    Vitals:    05/21/22 0502 05/21/22 0524 05/21/22 0912 05/21/22 1100   BP: (!) 157/98  (!) 153/96 (!) 140/90   Pulse: 88  91 75   Resp: 26 13 18 18   Temp: 98.1 °F (36.7 °C)  98.8 °F (37.1 °C) 98.7 °F (37.1 °C)   TempSrc: Oral  Oral Oral   SpO2: 100% 100%     Weight: 187 lb 12.8 oz (85.2 kg)      Height: 6' (1.829 m)          General Appearance:  No distress, conversant    Constitutional:  Well developed, Well nourished, No acute distress, Non-toxic appearance. HENT:  Normocephalic, Atraumatic, Bilateral external ears normal, Oropharynx moist, No oral exudates, Nose normal. Neck- Normal range of motion, No tenderness, Supple, No stridor,no apical-carotid delay  Lymphatics : no palpable lymph nodes  Eyes:  PERRL, EOMI, Conjunctiva normal, No discharge. Respiratory:  Normal breath sounds, No respiratory distress, No wheezing, No chest tenderness. ,no use of accessory muscles, crackles Absent   Cardiovascular: (PMI) apex non displaced,no lifts no thrills, ankle swelling Absent  , 1+, s1 and s2 audible,Murmur. Absent , JVD not noted    Abdomen /GI:  Bowel sounds normal, Soft, No tenderness, No masses, No gross visceromegaly   :  No costovertebral angle tenderness   Musculoskeletal:  No edema, no tenderness, no deformities.  Back- no tenderness  Integument:  Well hydrated, no rash   Lymphatic:  No lymphadenopathy noted   Neurologic:  Alert & oriented x 3, CN 2-12 normal, normal motor function, normal sensory function, no focal deficits noted           Medical decision making and Data review:    Lab Review   Recent Labs     05/21/22  0940   WBC 6.9   HGB 9.6*   HCT 31.3*   *      Recent Labs     05/21/22  0940      K 4.2      CO2 21   BUN 8   CREATININE 0.7*     Recent Labs     05/20/22  2304   AST 15   ALT 8*   BILITOT 0.2   ALKPHOS 104     Recent Labs     05/20/22  2304 05/21/22  0940   TROPONINT <0.010 <0.010       Recent Labs     05/20/22  2304   PROBNP 230.5     Lab Results   Component Value Date    INR 0.89 05/20/2022    PROTIME 11.5 (L) 05/20/2022       EKG: (reviewed by myself)    ECHO:(reviewed by myself)    Chest Xray:(reviewed by myself)  CTA PULMONARY W CONTRAST    Result Date: 5/21/2022  EXAMINATION: CTA OF THE CHEST 5/21/2022 1:18 am TECHNIQUE: CTA of the chest was performed after the administration of intravenous contrast.  Multiplanar reformatted images are provided for review. MIP images are provided for review. Automated exposure control, iterative reconstruction, and/or weight based adjustment of the mA/kV was utilized to reduce the radiation dose to as low as reasonably achievable. COMPARISON: June 12, 2021. HISTORY: ORDERING SYSTEM PROVIDED HISTORY: Pain, shortness of breath, syncope, history of DVT, rule out PE or pneumonia TECHNOLOGIST PROVIDED HISTORY: Reason for exam:->Pain, shortness of breath, syncope, history of DVT, rule out PE or pneumonia Decision Support Exception - unselect if not a suspected or confirmed emergency medical condition->Emergency Medical Condition (MA) Reason for Exam: Pain, shortness of breath, syncope, history of DVT, FINDINGS: Pulmonary Arteries: Pulmonary arteries are adequately opacified for evaluation. No evidence of intraluminal filling defect to suggest pulmonary embolism. Main pulmonary artery is normal in caliber. Mediastinum: No evidence of mediastinal lymphadenopathy. The heart and pericardium demonstrate no acute abnormality. There is no acute abnormality of the thoracic aorta. Lungs/pleura: The lungs are without acute process. No focal consolidation or pulmonary edema. No evidence of pleural effusion or pneumothorax. Upper Abdomen: Limited images of the upper abdomen are unremarkable. Soft Tissues/Bones: No acute bone or soft tissue abnormality. No evidence of pulmonary embolism or acute pulmonary abnormality. All labs, medications and tests reviewed by myself including data  from outside source , patient and available family .   Continue all other medications of all above medical condition listed as is. Impression:  Principal Problem:    Chest pain  Active Problems:    Subacute osteomyelitis of left foot (HCC)    Anemia associated with acute blood loss    Diabetic ulcer of left midfoot associated with type 2 diabetes mellitus, with muscle involvement without evidence of necrosis ([address]    Unstable angina ([address]    Essential hypertension  Resolved Problems:    * No resolved hospital problems. *      Assessment: 39 y. o.year old with PMH of  has a past medical history of Abscess of left foot, Anemia associated with acute blood loss, Callus of foot, Cellulitis of left foot, Diabetic ulcer of left midfoot associated with type 2 diabetes mellitus, with muscle involvement without evidence of necrosis ([address] Diabetic ulcer of left midfoot associated with type 2 diabetes mellitus, with necrosis of muscle ([address] Diabetic ulcer of right midfoot associated with type 2 diabetes mellitus, with fat layer exposed ([address] Dizziness, Essential hypertension, Hyperlipidemia, Lumbar radiculopathy, Septic embolism ([address] and Subacute osteomyelitis of left foot ([address] Plan and Recommendations:     can consider stress test for risk stratification due to multiple risk factors as outpatient if serial enzymes negative no further work-up at this time  Chest Pain: serial cardiac enzymes, EKG reviewed, further plan as per enzymes and clinical course  Peripheral arterial disease wound care as per primary team s/p amputation on antibiotics wound VAC care as per surgery  HTN: stable, continue present medications   Diabetes as per primary team  History of DVT repeat ultrasound to see if any progression of peroneal vein DVT? DVT prophylaxis if no contraindication  6. Dyslipidemia: continue statins           Thank you  much for consult and giving us the opportunity in contributing in the care of this patient. Please feel free to call me for any questions.        Denise Hurtado MD, 5/21/2022 1:21 PM

## 2022-05-21 NOTE — CONSULTS
[facility]  Consultation Note    Dada Castanon M.D.      Reason for Consult: Foot wounds      Patient's Name/Date of Birth: Amna Snow / 1980 (10 y.o.)    Date: May 21, 2022     HPI:  70-year-old male with multiple medical problems including uncontrolled diabetes chronic foot wounds and ulcers and previous toe amputations who was admitted to the hospital because of chest pain and discomfort. The patient claims he believe this was secondary to anxiety. His mother  yesterday and he was with her which caused tremendous stress. He was recently seen in the wound clinic and is followed closely by my partner Dr. Zach Carreon. He has a wound VAC in place in the left foot. There are no new changes in his lower extremity wounds.     Past Medical History:   Diagnosis Date    Abscess of left foot 2022    Anemia associated with acute blood loss 2022    Callus of foot     Right foot - see's Dr. Antony Mayo Cellulitis of left foot 2022    Diabetic ulcer of left midfoot associated with type 2 diabetes mellitus, with muscle involvement without evidence of necrosis ([address] 2022    Diabetic ulcer of left midfoot associated with type 2 diabetes mellitus, with necrosis of muscle ([address] 2021    Diabetic ulcer of right midfoot associated with type 2 diabetes mellitus, with fat layer exposed ([address] 2020    Dizziness     positional    Essential hypertension     Follows with PCP    Hyperlipidemia     Lumbar radiculopathy     Septic embolism ([address] 2020    Subacute osteomyelitis of left foot ([address] 2022       Past Surgical History:   Procedure Laterality Date    ACHILLES TENDON SURGERY Right 2021    RIGHT ACHILLES TENDON LENGTHENING REPAIR performed by Lula Castanon DPM at [address] [facility]  2018    [place]    DENTAL SURGERY      teeth extractions -half per patient    HERNIA REPAIR Bilateral 2020    BIALTERAL HERNIA INGUINAL REPAIR performed by Viviano Lesches, MD at [address] [place] [address]  2018    [place] OFFICE/OUTPT VISIT,PROCEDURE ONLY Left 4/17/2018    L5-S1 HEMILAMINECTOMY, REMOVAL OF DISC LEFT SIDE performed by Leatha Smiley MD at [address] Right 1/8/2021    RIGHT TRANSMETATARSAL TOE AMPUTATION performed by Anna Freeman DPM at [facility] TOE AMPUTATION Left 4/23/2022    LEFT RAY GREAT TOE AMPUTATION performed by Lelia Starks MD at [address] [facility] [address] EXTRACTION         No Known Allergies    Family History   Problem Relation Age of Onset    Heart Disease Father     Diabetes Father     Diabetes Mother     Heart Disease Mother     Other Mother     Kidney Disease Mother     Heart Attack Mother        Social History     Socioeconomic History    Marital status: Single     Spouse name: Not on file    Number of children: Not on file    Years of education: Not on file    Highest education level: Not on file   Occupational History    Not on file   Tobacco Use    Smoking status: Never Smoker    Smokeless tobacco: Never Used   Vaping Use    Vaping Use: Never used   Substance and Sexual Activity    Alcohol use: Yes     Comment: \"couple beers a night\"    Drug use: No    Sexual activity: Yes     Partners: Female   Other Topics Concern    Not on file   Social History Narrative    Not on file     Social Determinants of Health     Financial Resource Strain: Low Risk     Difficulty of Paying Living Expenses: Not hard at all   Food Insecurity: No Food Insecurity    Worried About [address] in the Last Year: Never true    [facility] in the Last Year: Never true   Transportation Needs:     Lack of Transportation (Medical): Not on file    Lack of Transportation (Non-Medical):  Not on file   Physical Activity:     Days of Exercise per Week: Not on file    Minutes of Exercise per Session: Not on file   Stress:     Feeling of Stress : Not on file Social Connections:     Frequency of Communication with Friends and Family: Not on file    Frequency of Social Gatherings with Friends and Family: Not on file    Attends Buddhist Services: Not on file    Active Member of Clubs or Organizations: Not on file    Attends Club or Organization Meetings: Not on file    Marital Status: Not on file   Intimate Partner Violence:     Fear of Current or Ex-Partner: Not on file    Emotionally Abused: Not on file    Physically Abused: Not on file    Sexually Abused: Not on file   Housing Stability:     Unable to Pay for Housing in the Last Year: Not on file    Number of [place] in the Last Year: Not on file    Unstable Housing in the Last Year: Not on file       ROS: Non-contributory    Physical Exam:  Vitals:    05/21/22 0502   BP: (!) 157/98   Pulse: 88   Resp: 26   Temp: 98.1 °F (36.7 °C)   SpO2: 100%       Chest: Breath sounds were clear and equal with no rales, wheezes, or rhonchi. Respiratory effort was normal with no retractions or use of accessory muscles. Cardiovascular: Heart sounds were normal with a regular rate and rhythm. There were no murmurs or gallops. Abdomen: Bowel sounds were normal.  The abdomen was soft and non distended. There was no tenderness, guarding, rebound, or rigidity. There was no masses, hepatosplenomegaly, or hernias. Dressings on the feet are intact and he has a wound VAC in place. There is no redness or cellulitis or signs of significant change since he was seen in the wound clinic just 3 days ago. Labs    CBC:   Lab Results   Component Value Date    WBC 7.7 05/20/2022    RBC 2.49 05/20/2022    HGB 7.4 05/20/2022    HCT 24.1 05/20/2022    MCV 96.8 05/20/2022    MCH 29.7 05/20/2022    MCHC 30.7 05/20/2022    RDW 14.6 05/20/2022     05/20/2022    MPV 9.0 05/20/2022         Assessment/Plan:  Leave dressings intact.   The patient is most likely going to be discharged home and he is going to have home health see him on Monday per the patient's report  If the patient is here beyond Monday consult wound care nurse for wound check and wound VAC change.   Please call me if needed    Lizabeth Wayne MD   5/21/2022 at 8:22 AM

## 2022-05-21 NOTE — DISCHARGE SUMMARY
Hospital Discharge Summary    Patient:  Anibal Bernheim  YOB: 1980    MRN: [id number]   Acct: [de-identified]    Primary Care Physician: Sidra Gray MD    Admit date:  2022    Discharge date:  22      Discharge Diagnoses:   Chest pain  Principal Problem:    Chest pain  Active Problems:    Anxiety attack    Subacute osteomyelitis of left foot ([address]    Anemia associated with acute blood loss    Diabetic ulcer of left midfoot associated with type 2 diabetes mellitus, with muscle involvement without evidence of necrosis ([address]    Unstable angina ([facility] [address]    Essential hypertension  Resolved Problems:    * No resolved hospital problems. *        Admitted for: (HPI)Julien Zelaya is a 39 y.o. male with pmh of uncontrolled type II DM, dyslipidemia, hypertension, peroneal thrombus, chronic back pain status postlaminectomy, diabetic foot infections status post right toes amputation due to osteomyelitis in 2021 and left great toe amputation 2022 with current wound VAC who presents with acute onset left-sided chest pain, nonradiating and described as sharp. States he cannot tolerate it when it started. But denies chest pain at the time of this exam.  He reports associated SOB. He reports some anxiety at onset of symptoms. He states that he lost his mother yesterday which could be causing his symptoms. He was noted to be hypoxic and unresponsive in route to the ED requiring oxygen supplementation. He had similar hypoxic episode at the ER but was protecting his airway. He denies fever, chills, nausea, vomiting, diarrhea and constipation. Patient appears emotional.    Hospital Course: Patient presented with chest pain. His mom  yesterday. He was admitted and ruled out for ACS with serial cardiac enzymes x 3. Chemistry was unremarkable except for mild hyponatremia and hypokalemia which have now resolved. No function is normal.  Mild metabolic acidosis has resolved.   Lactic acidosis has resolved. Magnesium is normal at 1.9. His initial hemoglobin was 7.4 but when repeated was 9.6 so most likely lab error. CBC otherwise normal.  UA is negative. CTA chest negative for PE or acute pulmonary abnormality  He was seen by general surgery for wound care and recommends to continue with home health on Monday since he is going home but if he on Monday to call them back. Patient was tearful and having some anxiety attack when seen in the morning. He was given Ativan with improvement. I was called this evening by RN stating that patient wants to be discharged. Since labs are unremarkable and he feels fine, I do not see any barriers to discharge. He will be discharged to follow-up with home health as previously ordered. He will continue all his home meds except for glyburide which was DC'd given that he is on insulin, metformin and Actos and his blood sugars are in the 120s. He will continue to follow with his PCP. He will continue his outpatient IV antibiotics as before. Consultants: General surgery    Discharge Medications:       Medication List      CONTINUE taking these medications    acetaminophen 325 MG tablet  Commonly known as: TYLENOL  Take 2 tablets by mouth every 4 hours as needed for Pain or Fever     Alcohol Swabs Pads     aspirin 81 MG tablet  Take 1 tablet by mouth daily     atorvastatin 40 MG tablet  Commonly known as: LIPITOR  Take 1 tablet by mouth nightly     blood glucose monitor kit and supplies  Dispense sufficient amount for indicated testing frequency plus additional to accommodate PRN testing needs. Dispense all needed supplies to include: monitor, strips, lancing device, lancets, control solutions, alcohol swabs. blood glucose test strips  Test 2 times a day & as needed for symptoms of irregular blood glucose. Dispense sufficient amount for indicated testing frequency plus additional to accommodate PRN testing needs.      ceFAZolin 1 g injection  Commonly known as: ANCEF  Infuse 2,000 mg intravenously every 8 hours     collagenase 250 UNIT/GM ointment  Apply topically daily.      cyanocobalamin 2000 MCG tablet  Take 1 tablet by mouth daily     escitalopram 10 MG tablet  Commonly known as: Lexapro  Take 1 tablet by mouth daily     FreeStyle Lancets Misc  1 each by Does not apply route daily     Lantus SoloStar 100 UNIT/ML injection pen  Generic drug: insulin glargine  Inject 24 Units into the skin nightly     losartan 25 MG tablet  Commonly known as: COZAAR  Take 1 tablet by mouth daily     metFORMIN 500 MG tablet  Commonly known as: GLUCOPHAGE  Take 2 tablets by mouth 2 times daily (with meals) Indications: Start tomorrow 03/14     metoprolol succinate 25 MG extended release tablet  Commonly known as: TOPROL XL  Take 1 tablet by mouth daily     omeprazole 20 MG delayed release capsule  Commonly known as: PRILOSEC  Take 1 capsule by mouth once daily in the morning     pioglitazone 30 MG tablet  Commonly known as: Actos  Take 1 tablet by mouth daily        STOP taking these medications    glyBURIDE 5 MG tablet  Commonly known as: DIABETA          Physical Exam:    Vitals:  Vitals:    05/21/22 0524 05/21/22 0912 05/21/22 1100 05/21/22 1500   BP:  (!) 153/96 (!) 140/90 (!) 146/101   Pulse:  91 75 78   Resp: 13 18 18 18   Temp:  98.8 °F (37.1 °C) 98.7 °F (37.1 °C) 98.2 °F (36.8 °C)   TempSrc:  Oral Oral Oral   SpO2: 100%      Weight:       Height:         Weight: Weight: 187 lb 12.8 oz (85.2 kg)     24 hour intake/output:    Intake/Output Summary (Last 24 hours) at 5/21/2022 8583  Last data filed at 5/21/2022 0353  Gross per 24 hour   Intake 1000 ml   Output 1500 ml   Net -500 ml       General appearance - alert, well appearing, and in no distress  Chest - clear to auscultation, no wheezes, rales or rhonchi, symmetric air entry  Heart - normal rate, regular rhythm, normal S1, S2, no murmurs, rubs, clicks or gallops  Abdomen - soft, nontender, nondistended, no masses or organomegaly  Obese: No; Protuberant: No   Neurological - alert, oriented, normal speech, no focal findings or movement disorder noted  Extremities - peripheral pulses normal, no pedal edema, no clubbing or cyanosis. Dressing on right foot, wound VAC left foot covered with dressing. Skin - normal coloration and turgor, no rashes, no suspicious skin lesions noted    Procedures:      Diagnostic Test:      Radiology reports as per the Radiologist  Radiology:     CTA PULMONARY W CONTRAST  Result Date: 5/21/2022  EXAMINATION: CTA OF THE CHEST 5/21/2022 1:18 am TECHNIQUE: CTA of the chest was performed after the administration of intravenous contrast.  Multiplanar reformatted images are provided for review. MIP images are provided for review. Automated exposure control, iterative reconstruction, and/or weight based adjustment of the mA/kV was utilized to reduce the radiation dose to as low as reasonably achievable. COMPARISON: June 12, 2021. HISTORY: ORDERING SYSTEM PROVIDED HISTORY: Pain, shortness of breath, syncope, history of DVT, rule out PE or pneumonia TECHNOLOGIST PROVIDED HISTORY: Reason for exam:->Pain, shortness of breath, syncope, history of DVT, rule out PE or pneumonia Decision Support Exception - unselect if not a suspected or confirmed emergency medical condition->Emergency Medical Condition (MA) Reason for Exam: Pain, shortness of breath, syncope, history of DVT, FINDINGS: Pulmonary Arteries: Pulmonary arteries are adequately opacified for evaluation. No evidence of intraluminal filling defect to suggest pulmonary embolism. Main pulmonary artery is normal in caliber. Mediastinum: No evidence of mediastinal lymphadenopathy. The heart and pericardium demonstrate no acute abnormality. There is no acute abnormality of the thoracic aorta. Lungs/pleura: The lungs are without acute process. No focal consolidation or pulmonary edema. No evidence of pleural effusion or pneumothorax.  Upper Abdomen: Limited images of the upper abdomen are unremarkable. Soft Tissues/Bones: No acute bone or soft tissue abnormality. No evidence of pulmonary embolism or acute pulmonary abnormality.         Results for orders placed or performed during the hospital encounter of 05/20/22   COVID-19, Rapid    Specimen: Nasopharyngeal   Result Value Ref Range    Source THROAT     SARS-CoV-2, NAAT NOT DETECTED NOT DETECTED   CBC with Auto Differential   Result Value Ref Range    WBC 7.7 4.0 - 10.5 K/CU MM    RBC 2.49 (L) 4.6 - 6.2 M/CU MM    Hemoglobin 7.4 (L) 13.5 - 18.0 GM/DL    Hematocrit 24.1 (L) 42 - 52 %    MCV 96.8 78 - 100 FL    MCH 29.7 27 - 31 PG    MCHC 30.7 (L) 32.0 - 36.0 %    RDW 14.6 11.7 - 14.9 %    Platelets 843 099 - 025 K/CU MM    MPV 9.0 7.5 - 11.1 FL    Differential Type AUTOMATED DIFFERENTIAL     Segs Relative 61.6 36 - 66 %    Lymphocytes % 27.2 24 - 44 %    Monocytes % 8.0 (H) 0 - 4 %    Eosinophils % 2.3 0 - 3 %    Basophils % 0.4 0 - 1 %    Segs Absolute 4.8 K/CU MM    Lymphocytes Absolute 2.1 K/CU MM    Monocytes Absolute 0.6 K/CU MM    Eosinophils Absolute 0.2 K/CU MM    Basophils Absolute 0.0 K/CU MM    Nucleated RBC % 0.0 %    Total Nucleated RBC 0.0 K/CU MM    Total Immature Neutrophil 0.04 K/CU MM    Immature Neutrophil % 0.5 (H) 0 - 0.43 %   Comprehensive Metabolic Panel w/ Reflex to MG   Result Value Ref Range    Sodium 132 (L) 135 - 145 MMOL/L    Potassium 3.4 (L) 3.5 - 5.1 MMOL/L    Chloride 99 99 - 110 mMol/L    CO2 19 (L) 21 - 32 MMOL/L    BUN 9 6 - 23 MG/DL    CREATININE 0.9 0.9 - 1.3 MG/DL    Glucose 218 (H) 70 - 99 MG/DL    Calcium 8.7 8.3 - 10.6 MG/DL    Albumin 3.7 3.4 - 5.0 GM/DL    Total Protein 8.2 6.4 - 8.2 GM/DL    Total Bilirubin 0.2 0.0 - 1.0 MG/DL    ALT 8 (L) 10 - 40 U/L    AST 15 15 - 37 IU/L    Alkaline Phosphatase 104 40 - 129 IU/L    GFR Non-African American >60 >60 mL/min/1.73m2    GFR African American >60 >60 mL/min/1.73m2    Anion Gap 14 4 - 16   Troponin   Result Value Ref Range Troponin T <0.010 <0.01 NG/ML   Brain Natriuretic Peptide   Result Value Ref Range    Pro-.5 <300 PG/ML   Protime-INR   Result Value Ref Range    Protime 11.5 (L) 11.7 - 14.5 SECONDS    INR 0.89 INDEX   APTT   Result Value Ref Range    aPTT 27.9 25.1 - 37.1 SECONDS   D-Dimer, Quantitative   Result Value Ref Range    D-Dimer, Quant 369 (H) <230 NG/mL(DDU)   Lactate, Sepsis   Result Value Ref Range    Lactic Acid, Sepsis 2.4 (HH) 0.5 - 1.9 mMOL/L   Urinalysis with Reflex to Culture    Specimen: Urine   Result Value Ref Range    Color, UA COLORLESS (A) YELLOW    Clarity, UA CLEAR CLEAR    Glucose, Urine NEGATIVE NEGATIVE MG/DL    Bilirubin Urine NEGATIVE NEGATIVE MG/DL    Ketones, Urine NEGATIVE NEGATIVE MG/DL    Specific Gravity, UA <1.005 1.001 - 1.035    Blood, Urine TRACE (A) NEGATIVE    pH, Urine 6.0 5.0 - 8.0    Protein, UA NEGATIVE NEGATIVE MG/DL    Urobilinogen, Urine 0.2 0.2 - 1.0 MG/DL    Nitrite Urine, Quantitative NEGATIVE NEGATIVE    Leukocyte Esterase, Urine NEGATIVE NEGATIVE    RBC, UA <1 0 - 3 /HPF    WBC, UA <1 0 - 2 /HPF    Bacteria, UA NEGATIVE NEGATIVE /HPF   Blood Gas, Venous   Result Value Ref Range    pH, Eric 7.36 7.32 - 7.42    pCO2, Eric 37 (L) 38 - 52 mmHG    pO2, Eric 115 (H) 28 - 48 mmHG    Base Excess 4 (H) 0 - 3.3    HCO3, Venous 20.9 19 - 25 MMOL/L    O2 Sat, Eric 95.2 (H) 50 - 70 %   Drug screen multi urine   Result Value Ref Range    Cannabinoid Scrn, Ur NEGATIVE NEGATIVE    Amphetamines NEGATIVE NEGATIVE    Cocaine Metabolite NEGATIVE NEGATIVE    Benzodiazepine Screen, Urine NEGATIVE NEGATIVE    Barbiturate Screen, Ur NEGATIVE NEGATIVE    Opiates, Urine NEGATIVE NEGATIVE    Phencyclidine, Urine NEGATIVE NEGATIVE    Oxycodone NEGATIVE NEGATIVE   Ethanol   Result Value Ref Range    Alcohol Scrn 0.13 (H) <0.01 %WT/VOL   Ammonia   Result Value Ref Range    Ammonia 30 16 - 60 UMOL/L   Acetaminophen Level   Result Value Ref Range    Acetaminophen Level <5.0 (L) 15 - 30 ug/ml    DOSE AMOUNT DOSE AMT. GIVEN - UNKNOWN     DOSE TIME DOSE TIME GIVEN - UNKNOWN    Salicylate   Result Value Ref Range    Salicylate Lvl <1.5 (L) 15 - 30 MG/DL    DOSE AMOUNT DOSE AMT.  GIVEN - UNKNOWN     DOSE TIME DOSE TIME GIVEN - UNKNOWN    Magnesium   Result Value Ref Range    Magnesium 1.9 1.8 - 2.4 mg/dl   CBC   Result Value Ref Range    WBC 6.9 4.0 - 10.5 K/CU MM    RBC 3.23 (L) 4.6 - 6.2 M/CU MM    Hemoglobin 9.6 (L) 13.5 - 18.0 GM/DL    Hematocrit 31.3 (L) 42 - 52 %    MCV 96.9 78 - 100 FL    MCH 29.7 27 - 31 PG    MCHC 30.7 (L) 32.0 - 36.0 %    RDW 14.8 11.7 - 14.9 %    Platelets 253 (H) 090 - 440 K/CU MM    MPV 8.6 7.5 - 11.1 FL   Basic Metabolic Panel w/ Reflex to MG   Result Value Ref Range    Sodium 137 135 - 145 MMOL/L    Potassium 4.2 3.5 - 5.1 MMOL/L    Chloride 105 99 - 110 mMol/L    CO2 21 21 - 32 MMOL/L    Anion Gap 11 4 - 16    BUN 8 6 - 23 MG/DL    CREATININE 0.7 (L) 0.9 - 1.3 MG/DL    Glucose 181 (H) 70 - 99 MG/DL    Calcium 8.6 8.3 - 10.6 MG/DL    GFR Non-African American >60 >60 mL/min/1.73m2    GFR African American >60 >60 mL/min/1.73m2   Vitamin B12 & Folate   Result Value Ref Range    Vitamin B-12 226.5 211 - 911 pg/ml    Folate 13.1 3.1 - 17.5 NG/ML   Troponin   Result Value Ref Range    Troponin T <0.010 <0.01 NG/ML   Troponin   Result Value Ref Range    Troponin T <0.010 <0.01 NG/ML   Lactic Acid   Result Value Ref Range    Lactate 0.7 0.4 - 2.0 mMOL/L   POCT Glucose   Result Value Ref Range    POC Glucose 194 (H) 70 - 99 MG/DL   POCT Glucose   Result Value Ref Range    POC Glucose 129 (H) 70 - 99 MG/DL   EKG 12 Lead   Result Value Ref Range    Ventricular Rate 112 BPM    Atrial Rate 112 BPM    P-R Interval 138 ms    QRS Duration 92 ms    Q-T Interval 338 ms    QTc Calculation (Bazett) 461 ms    P Axis 50 degrees    R Axis 29 degrees    T Axis 0 degrees    Diagnosis       Sinus tachycardia  Otherwise normal ECG  When compared with ECG of 18-APR-2022 17:28,  No significant change was found  Confirmed by SHO Poon ([id number]) on 5/21/2022 3:12:44 PM     EKG 12 lead   Result Value Ref Range    Ventricular Rate 84 BPM    Atrial Rate 84 BPM    P-R Interval 166 ms    QRS Duration 102 ms    Q-T Interval 378 ms    QTc Calculation (Bazett) 446 ms    P Axis 61 degrees    R Axis 29 degrees    T Axis 4 degrees    Diagnosis       Normal sinus rhythm  Normal ECG  When compared with ECG of 20-MAY-2022 10:59,  T wave inversion less evident in Inferior leads  Confirmed by SHO Poon ([id number]) on 5/21/2022 3:17:14 PM     TYPE AND SCREEN   Result Value Ref Range    ABO/Rh A POSITIVE     Antibody Screen NEGATIVE     Unit Number [id number]     Component LEUKO-POOR RED CELLS     Unit Divison 00     Status ALLOCATED     Transfusion Status OK TO TRANSFUSE     Crossmatch Result COMPATIBLE        Diet:  ADULT DIET;  Regular; 4 carb choices (60 gm/meal)    Activity:  Up ad abimbola (Patient can move independently)    Follow-up:  in 1 weeks with Collette Howard MD,      Disposition: Home with home health care    Condition: Stable      Time Spent: 35 minutes    Electronically signed by Indio Lambert MD on 5/21/2022 at 5:29 PM    Discharging Hospitalist

## 2022-05-21 NOTE — PROGRESS NOTES
Patient admitted this morning with chest pain and SOB. Lost his mom yesterday. Seen at bedside and evaluated. RN present. Patient very tearful and having an episode of anxiety attack. Denies chest pain. Vitals labs and meds reviewed ,patient examined. Not on oxygen  Lungs clear  CVS S1-S2 regular no murmur  Abdomen soft nontender no palpable masses,bs+  CNS non focal  Extremities; right foot metatarsal amputation dressing to the stump                      Left great toe amputation with dressing and wound VAC      Plan  As in the H&P  Troponin x2 negative  Patient not having chest pain now  Had a normal echo last months with EF of 55% and normal LV systolic function  Had normal [facility] March 2021  Await cardiology consult and recommendations  Appreciate general surgery consult for wound  Ativan 1 mg every 6 as needed for anxiety  Hemoglobin was 7.4, repeat 9.6 so likely lab error he initially  [address] order for 1 unit of PRBC  Mild hypokalemia has resolved. Mild hyponatremia has also resolved  Elevated lactic acid but no symptoms or signs of sepsis  Recheck lactic acid in the morning  Anion gap normal at 21, was previously 19. Renal function is good  LFTs normal  Ammonia level low at 30  CTA chest negative for PE or acute pulmonary abnormality  Plan discharge tomorrow if no new issues.       Dagoberto Erwin MD

## 2022-05-21 NOTE — ED NOTES
Dr. Ephraim Phalen at bedside upon pt arrival     Ruben Rashid, [place]  05/20/22 [id number]

## 2022-05-21 NOTE — ED NOTES
Pt with PICC line in left arm, dressings in place to bilat feet due to toe amputations. Pt also has wound vac to L great toe.      Rashel Luna RN  05/20/22 0020 Jesus Manuel Magallon RN  05/21/22 3690 [place], RN  05/21/22 3711

## 2022-05-21 NOTE — ED PROVIDER NOTES
Emergency Department Encounter    Patient: Javi Fried  MRN: [id number]  : 1980  Date of Evaluation: 2022  ED Provider:  Christy Wilkinson MD      Triage Chief Complaint:   Chest Pain (x30 mins, ASA and nitro given by EMS) and Shortness of Breath      [place]:  Javi Fried is a 39 y.o. male that presents to the emergency department with EMS who provides much of the available history. About 30 minutes prior to arrival, patient had the abrupt onset of midsternal chest discomfort. He was given aspirin and nitroglycerin on route. Just prior to arrival, patient had an apparent unresponsive episode, though there was not loss of airway control. EMS reports that the patient's mother  unexpectedly this morning. She was a dialysis patient and underwent cardiac arrest in the field. Patient has been obviously upset throughout the day. He does acknowledge drinking \"a little\" alcohol, but he denies any recreational drugs. Patient later reports that the pain is sharp in quality and felt in the mid and left chest.  Pain does tend to worsen with deep breath. Patient does report he was admitted to the hospital and had surgical removal of the left great toe about a month ago. He has a wound VAC in place and has been receiving antibiotics through a PICC in the left arm. Patient reports history of DVT in the left lower leg. Patient reports no other particular provocative or alleviating factors. ROS - see HPI, below listed is current ROS at time of my eval:  CONSTITUTIONAL: No fevers, chills, or sweats. EYES: No vision change, redness, drainage, or discharge. HENT: No sore throat, runny nose, or earache. No painful swallowing. RESPIRATORY: No shortness of breath, cough, or sputum production. CARDIOVASCULAR: As above. GASTROINTESTINAL: No nausea, vomiting, or abdominal pain. GENITOURINARY: No frequency, urgency, or dysuria. No hematuria. MUSCULOSKELETAL: As above.   NEUROLOGICAL: No focal weakness, numbness, or tingling. SKIN: No rashes or other lesions reported. No yellowing of the skin.     Medical history:  Past Medical History:   Diagnosis Date    Abscess of left foot 4/22/2022    Anemia associated with acute blood loss 4/26/2022    Callus of foot     Right foot - see's Dr. Cristi Gilbert Cellulitis of left foot 4/22/2022    Diabetic ulcer of left midfoot associated with type 2 diabetes mellitus, with muscle involvement without evidence of necrosis ([address] 4/26/2022    Diabetic ulcer of left midfoot associated with type 2 diabetes mellitus, with necrosis of muscle ([address] 6/7/2021    Diabetic ulcer of right midfoot associated with type 2 diabetes mellitus, with fat layer exposed ([address] 8/11/2020    Dizziness     positional    Essential hypertension     Follows with PCP    Hyperlipidemia     Lumbar radiculopathy     Septic embolism ([address] 6/13/2020    Subacute osteomyelitis of left foot ([address] 4/22/2022     Past Surgical History:   Procedure Laterality Date    ACHILLES TENDON SURGERY Right 1/8/2021    RIGHT ACHILLES TENDON LENGTHENING REPAIR performed by Radhika Cueva DPM at [address]  03/2018    [facility]    DENTAL SURGERY      teeth extractions -half per patient    HERNIA REPAIR Bilateral 5/27/2020    BIALTERAL HERNIA INGUINAL REPAIR performed by Joie Leone MD at [address] [place] [address]  2018    [place] OFFICE/OUTPT VISIT,PROCEDURE ONLY Left 4/17/2018    L5-S1 HEMILAMINECTOMY, REMOVAL OF DISC LEFT SIDE performed by Mark Pool MD at [address] Right 1/8/2021    RIGHT TRANSMETATARSAL TOE AMPUTATION performed by Radhika Cueva DPM at [facility] TOE AMPUTATION Left 4/23/2022    LEFT RAY GREAT TOE AMPUTATION performed by Nirav Bassett MD at [address] EXTRACTION       Family History   Problem Relation Age of Onset    Heart Disease Father     Diabetes Father     Diabetes Mother     Heart Disease Mother [place] Other Mother     Kidney Disease Mother     Heart Attack Mother      Social History     Socioeconomic History    Marital status: Single     Spouse name: Not on file    Number of children: Not on file    Years of education: Not on file    Highest education level: Not on file   Occupational History    Not on file   Tobacco Use    Smoking status: Never Smoker    Smokeless tobacco: Never Used   Vaping Use    Vaping Use: Never used   Substance and Sexual Activity    Alcohol use: Yes     Comment: \"couple beers a night\"    Drug use: No    Sexual activity: Yes     Partners: Female   Other Topics Concern    Not on file   Social History Narrative    Not on file     Social Determinants of Health     Financial Resource Strain: Low Risk     Difficulty of Paying Living Expenses: Not hard at all   Food Insecurity: No Food Insecurity    Worried About [address] 1-800-DENTIST in the Last Year: Never true    [address] cottonTracks in the Last Year: Never true   Transportation Needs:     Lack of Transportation (Medical): Not on file    Lack of Transportation (Non-Medical):  Not on file   Physical Activity:     Days of Exercise per Week: Not on file    Minutes of Exercise per Session: Not on file   Stress:     Feeling of Stress : Not on file   Social Connections:     Frequency of Communication with Friends and Family: Not on file    Frequency of Social Gatherings with Friends and Family: Not on file    Attends Hinduism Services: Not on file    Active Member of Clubs or Organizations: Not on file    Attends Club or Organization Meetings: Not on file    Marital Status: Not on file   Intimate Partner Violence:     Fear of Current or Ex-Partner: Not on file    Emotionally Abused: Not on file    Physically Abused: Not on file    Sexually Abused: Not on file   Housing Stability:     Unable to Pay for Housing in the Last Year: Not on file    Number of [place] in the Last Year: Not on file    Unstable Housing in the Last Year: Not on file     No current facility-administered medications for this encounter. Current Outpatient Medications   Medication Sig Dispense Refill    Alcohol Swabs PADS       insulin glargine (LANTUS SOLOSTAR) 100 UNIT/ML injection pen Inject 24 Units into the skin nightly 3 pen 2    metFORMIN (GLUCOPHAGE) 500 MG tablet Take 2 tablets by mouth 2 times daily (with meals) Indications: Start tomorrow 03/14 360 tablet 1    pioglitazone (ACTOS) 30 MG tablet Take 1 tablet by mouth daily 90 tablet 1    atorvastatin (LIPITOR) 40 MG tablet Take 1 tablet by mouth nightly 90 tablet 1    losartan (COZAAR) 25 MG tablet Take 1 tablet by mouth daily 30 tablet 5    metoprolol succinate (TOPROL XL) 25 MG extended release tablet Take 1 tablet by mouth daily 90 tablet 1    blood glucose monitor strips Test 2 times a day & as needed for symptoms of irregular blood glucose. Dispense sufficient amount for indicated testing frequency plus additional to accommodate PRN testing needs. 100 strip 0    omeprazole (PRILOSEC) 20 MG delayed release capsule Take 1 capsule by mouth once daily in the morning 90 capsule 1    escitalopram (LEXAPRO) 10 MG tablet Take 1 tablet by mouth daily 30 tablet 5    aspirin 81 MG tablet Take 1 tablet by mouth daily 30 tablet 3    ceFAZolin (ANCEF) 1 g injection Infuse 2,000 mg intravenously every 8 hours 180 each 1    vitamin B-12 2000 MCG tablet Take 1 tablet by mouth daily 30 tablet 3    blood glucose monitor kit and supplies Dispense sufficient amount for indicated testing frequency plus additional to accommodate PRN testing needs. Dispense all needed supplies to include: monitor, strips, lancing device, lancets, control solutions, alcohol swabs.  1 kit 0    FreeStyle Lancets MISC 1 each by Does not apply route daily 100 each 3    acetaminophen (TYLENOL) 325 MG tablet Take 2 tablets by mouth every 4 hours as needed for Pain or Fever 120 tablet 3     No Known Allergies    Nursing Notes Reviewed    Physical Exam:  Triage VS:    ED Triage Vitals   Enc Vitals Group      BP 05/20/22 2257 (!) 159/83      Pulse 05/20/22 2300 111      Resp 05/20/22 2257 16      Temp 05/20/22 2257 98.7 °F (37.1 °C)      Temp Source 05/20/22 2257 Oral      SpO2 05/20/22 2300 97 %      Weight --       Height --       Head Circumference --       Peak Flow --       Pain Score --       Pain Loc --       Pain Edu? --       Excl. in [address]? --        My pulse ox interpretation is -97% at triage    GENERAL: Patient is initially somnolent, slow to respond, and Porten following commands. .  Patient is resting in a still position on the exam table. HEENT: Normocephalic and atraumatic. Pupils equal, round, and reactive to light. No redness or matting. Bilateral external ears are unremarkable. Nasal mucosa is pink without purulence. Oral mucosa is mildly dry. NECK: Supple with normal range of motion. No Kernig's or Brudzinski sign. No JVD. RESPIRATORY: Diminished effort with symmetric aeration. No respiratory distress. Faint coarse sounds with no wheeze, stridor, rales, rhonchi. Chest wall stable and nontender. CARDIOVASCULAR: Regular tachycardia. No murmurs, rubs, or gallops. No central or peripheral cyanosis. GASTROINTESTINAL: Soft, nontender, and nondistended. No McBurney's or Elizabeth's point tenderness. No other focal tenderness. No involuntary guarding, rebound, or rigidity. No mass or pulsatile mass. Bowel sounds normal.    NEUROLOGICAL: Awake, alert and oriented x 3. GCS 15. Cranial nerves III through XII are grossly intact as tested without facial droop or dermatomal paresthesias. Of note, forehead wrinkles are symmetric and intact. Conjugate gaze without entrapment. No asymmetry of the corners of the mouth or nasolabial folds. No gross motor or cerebellar deficits. BACK/MUSCULOSKELETAL: Multiple superficial abrasions noted on the anterior right lower leg.   No surrounding erythema or fluctuance. Right midfoot is wrapped. Dressings are clean and intact. Left foot has dressing and wound VAC intact. Wound VAC is left in place, but the surrounding tissue does not show inappropriate erythema or fluctuance. Mild calf tenderness with no asymmetric edema. No Homans sign or cords. SKIN: Normal tone for ethnicity. Normal turgor and brisk capillary refill peripherally. Emergency department course. Patient is brought to bed trauma-1 and assessed and reassessed by me. After initial evaluation, orders are placed for medical screening studies including CBC, metabolic panel, serial lactates, 2 sets of blood cultures, troponin, and CT of the chest due to concern for PE or other structural cause for chest pain. Patient is currently awake and alert and protecting his own airway. EKG shows tachycardia with no criteria ST elevation or reciprocal changes. Patient is agreeable to continuing plan. At approximately [id number] [phone] 83, nursing reports patient monitors alarmed for apnea, and saturations dropped as low as \"in the 60s\" with a good waveform. Patient appeared awake, blinking, and responding to noxious stimuli. Patient was placed on a nonrebreather and given naloxone 1 mg. At this time, patient is saturating around 97%. Numerous medical screening studies are pending. Ethanol and urine drug screen are added. Patient has been assessed and reassessed on multiple occasions. He is generally fully awake, alert, and oriented. There has been no recurrence of obtundation or somnolence. There has been no developing neurological deficit or meningeal finding. With the significant presentation, we have discussed admission to the hospital for further monitoring. After discussion of risks and benefits, patient made some indication that he could not stay. After further discussion, patient consented to admission. Hospitalist PARVEZ Hurtado NP, is paged at [phone] to discuss admission.   Initial verbal orders are discussed at 26. Decision time to admit: 0336. Patient seen during [facility], I did don appropriate PPE during my encounters with the patient, including n95 (when appropriate) mask and eye protection as appropriate.     I have reviewed and interpreted all of the currently available lab results from this visit (if applicable):  Results for orders placed or performed during the hospital encounter of 05/20/22   COVID-19, Rapid    Specimen: Nasopharyngeal   Result Value Ref Range    Source THROAT     SARS-CoV-2, NAAT NOT DETECTED NOT DETECTED   CBC with Auto Differential   Result Value Ref Range    WBC 7.7 4.0 - 10.5 K/CU MM    RBC 2.49 (L) 4.6 - 6.2 M/CU MM    Hemoglobin 7.4 (L) 13.5 - 18.0 GM/DL    Hematocrit 24.1 (L) 42 - 52 %    MCV 96.8 78 - 100 FL    MCH 29.7 27 - 31 PG    MCHC 30.7 (L) 32.0 - 36.0 %    RDW 14.6 11.7 - 14.9 %    Platelets 472 663 - 475 K/CU MM    MPV 9.0 7.5 - 11.1 FL    Differential Type AUTOMATED DIFFERENTIAL     Segs Relative 61.6 36 - 66 %    Lymphocytes % 27.2 24 - 44 %    Monocytes % 8.0 (H) 0 - 4 %    Eosinophils % 2.3 0 - 3 %    Basophils % 0.4 0 - 1 %    Segs Absolute 4.8 K/CU MM    Lymphocytes Absolute 2.1 K/CU MM    Monocytes Absolute 0.6 K/CU MM    Eosinophils Absolute 0.2 K/CU MM    Basophils Absolute 0.0 K/CU MM    Nucleated RBC % 0.0 %    Total Nucleated RBC 0.0 K/CU MM    Total Immature Neutrophil 0.04 K/CU MM    Immature Neutrophil % 0.5 (H) 0 - 0.43 %   Comprehensive Metabolic Panel w/ Reflex to MG   Result Value Ref Range    Sodium 132 (L) 135 - 145 MMOL/L    Potassium 3.4 (L) 3.5 - 5.1 MMOL/L    Chloride 99 99 - 110 mMol/L    CO2 19 (L) 21 - 32 MMOL/L    BUN 9 6 - 23 MG/DL    CREATININE 0.9 0.9 - 1.3 MG/DL    Glucose 218 (H) 70 - 99 MG/DL    Calcium 8.7 8.3 - 10.6 MG/DL    Albumin 3.7 3.4 - 5.0 GM/DL    Total Protein 8.2 6.4 - 8.2 GM/DL    Total Bilirubin 0.2 0.0 - 1.0 MG/DL    ALT 8 (L) 10 - 40 U/L    AST 15 15 - 37 IU/L    Alkaline Phosphatase 104 40 - 129 IU/L Result Value Ref Range    Ammonia 30 16 - 60 UMOL/L   Acetaminophen Level   Result Value Ref Range    Acetaminophen Level <5.0 (L) 15 - 30 ug/ml    DOSE AMOUNT DOSE AMT. GIVEN - UNKNOWN     DOSE TIME DOSE TIME GIVEN - UNKNOWN    Salicylate   Result Value Ref Range    Salicylate Lvl <6.6 (L) 15 - 30 MG/DL    DOSE AMOUNT DOSE AMT.  GIVEN - UNKNOWN     DOSE TIME DOSE TIME GIVEN - UNKNOWN    Magnesium   Result Value Ref Range    Magnesium 1.9 1.8 - 2.4 mg/dl   CBC   Result Value Ref Range    WBC 6.9 4.0 - 10.5 K/CU MM    RBC 3.23 (L) 4.6 - 6.2 M/CU MM    Hemoglobin 9.6 (L) 13.5 - 18.0 GM/DL    Hematocrit 31.3 (L) 42 - 52 %    MCV 96.9 78 - 100 FL    MCH 29.7 27 - 31 PG    MCHC 30.7 (L) 32.0 - 36.0 %    RDW 14.8 11.7 - 14.9 %    Platelets 648 (H) 355 - 440 K/CU MM    MPV 8.6 7.5 - 11.1 FL   Basic Metabolic Panel w/ Reflex to MG   Result Value Ref Range    Sodium 137 135 - 145 MMOL/L    Potassium 4.2 3.5 - 5.1 MMOL/L    Chloride 105 99 - 110 mMol/L    CO2 21 21 - 32 MMOL/L    Anion Gap 11 4 - 16    BUN 8 6 - 23 MG/DL    CREATININE 0.7 (L) 0.9 - 1.3 MG/DL    Glucose 181 (H) 70 - 99 MG/DL    Calcium 8.6 8.3 - 10.6 MG/DL    GFR Non-African American >60 >60 mL/min/1.73m2    GFR African American >60 >60 mL/min/1.73m2   Vitamin B12 & Folate   Result Value Ref Range    Vitamin B-12 226.5 211 - 911 pg/ml    Folate 13.1 3.1 - 17.5 NG/ML   Troponin   Result Value Ref Range    Troponin T <0.010 <0.01 NG/ML   Troponin   Result Value Ref Range    Troponin T <0.010 <0.01 NG/ML   Lactic Acid   Result Value Ref Range    Lactate 0.7 0.4 - 2.0 mMOL/L   POCT Glucose   Result Value Ref Range    POC Glucose 194 (H) 70 - 99 MG/DL   POCT Glucose   Result Value Ref Range    POC Glucose 129 (H) 70 - 99 MG/DL   EKG 12 Lead   Result Value Ref Range    Ventricular Rate 112 BPM    Atrial Rate 112 BPM    P-R Interval 138 ms    QRS Duration 92 ms    Q-T Interval 338 ms    QTc Calculation (Bazett) 461 ms    P Axis 50 degrees    R Axis 29 degrees    T Axis 0 degrees    Diagnosis       Sinus tachycardia  Otherwise normal ECG  When compared with ECG of 18-APR-2022 17:28,  No significant change was found  Confirmed by SHO Zhou ([id number]) on 5/21/2022 3:12:44 PM     EKG 12 lead   Result Value Ref Range    Ventricular Rate 84 BPM    Atrial Rate 84 BPM    P-R Interval 166 ms    QRS Duration 102 ms    Q-T Interval 378 ms    QTc Calculation (Bazett) 446 ms    P Axis 61 degrees    R Axis 29 degrees    T Axis 4 degrees    Diagnosis       Normal sinus rhythm  Normal ECG  When compared with ECG of 20-MAY-2022 10:59,  T wave inversion less evident in Inferior leads  Confirmed by SHO Zhou ([id number]) on 5/21/2022 3:17:14 PM     TYPE AND SCREEN   Result Value Ref Range    ABO/Rh A POSITIVE     Antibody Screen NEGATIVE     Unit Number [id number]     Component LEUKO-POOR RED CELLS     Unit Divison 00     Status ALLOCATED     Transfusion Status OK TO TRANSFUSE     Crossmatch Result COMPATIBLE         Radiographs (if obtained):  Radiologist's Report Reviewed:  CTA PULMONARY W CONTRAST    Result Date: 5/21/2022  EXAMINATION: CTA OF THE CHEST 5/21/2022 1:18 am TECHNIQUE: CTA of the chest was performed after the administration of intravenous contrast.  Multiplanar reformatted images are provided for review. MIP images are provided for review. Automated exposure control, iterative reconstruction, and/or weight based adjustment of the mA/kV was utilized to reduce the radiation dose to as low as reasonably achievable. COMPARISON: June 12, 2021.  HISTORY: ORDERING SYSTEM PROVIDED HISTORY: Pain, shortness of breath, syncope, history of DVT, rule out PE or pneumonia TECHNOLOGIST PROVIDED HISTORY: Reason for exam:->Pain, shortness of breath, syncope, history of DVT, rule out PE or pneumonia Decision Support Exception - unselect if not a suspected or confirmed emergency medical condition->Emergency Medical Condition (MA) Reason for Exam: Pain, shortness of breath, syncope, history of DVT, FINDINGS: Pulmonary Arteries: Pulmonary arteries are adequately opacified for evaluation. No evidence of intraluminal filling defect to suggest pulmonary embolism. Main pulmonary artery is normal in caliber. Mediastinum: No evidence of mediastinal lymphadenopathy. The heart and pericardium demonstrate no acute abnormality. There is no acute abnormality of the thoracic aorta. Lungs/pleura: The lungs are without acute process. No focal consolidation or pulmonary edema. No evidence of pleural effusion or pneumothorax. Upper Abdomen: Limited images of the upper abdomen are unremarkable. Soft Tissues/Bones: No acute bone or soft tissue abnormality. No evidence of pulmonary embolism or acute pulmonary abnormality. EKG:  Twelve-lead EKG obtained at 2259 on 20 May 2022 and interpreted by me in the absence of a cardiologist.  There is no criteria ST elevation or reciprocal change. There are no hyperacute T wave changes. There is no sign of acute ischemia or infarction. This tracing shows a sinus tachycardia. Rate and intervals are 112 beats per minute, IA interval 138 milliseconds, QRS duration 92 milliseconds, QTc interval 461 milliseconds, and R axis normal at 29 degrees. There is no acute change compared with the most recent EKG dated April 18, 2022. Medical decision making:  Patient presents to the emergency department with abrupt onset of chest pain and shortness of breath. Patient has a complicated medical history including osteomyelitis and subsequent amputations. Consider an anginal equivalent, but there has been no detectable ischemia or infarction identified. Patient does have history of DVT, but CT imaging does not reveal detectable pulmonary emboli or focal pneumonia. Certainly consider an SIRS or septic process, but there has been no hypoperfusion, hypotension, or fever.   Patient has had episodes of hypoxia, but he has not required more aggressive airway management. Procedures: None. Consultations: Hospitalist service. Clinical Impression:  1. Chest pain, unspecified type    2. Hypoxia    3. Grieving    4. Alcohol use    5. History of osteomyelitis    6. Chronic anemia      Disposition referral (if applicable):  No follow-up provider specified. Disposition medications (if applicable):  Discharge Medication List as of 5/21/2022  6:17 PM        ED Provider Disposition Time  DISPOSITION Admitted 05/21/2022 04:45:02 AM      Comment: Please note this report has been produced using speech recognition software and may contain errors related to that system including errors in grammar, punctuation, and spelling, as well as words and phrases that may be inappropriate. Efforts were made to edit the dictations.         Griselda Cortés MD  05/21/22 [id number]

## 2022-05-21 NOTE — H&P
V2.0  History and Physical      Name:  Myrtle Aguayo /Age/Sex: 1980  (39 y.o. male)   MRN & CSN:  [id number] & [id number] Encounter Date/Time: 2022 4:46 AM EDT   Location:  [facility] PCP: Juliana Brown MD       Hospital Day: 2    Assessment and Plan:   Myrtle Aguayo is a 39 y.o. male with a pmh of uncontrolled type II DM, dyslipidemia, hypertension, peroneal thrombus, chronic back pain status postlaminectomy, diabetic foot infections status post right toes amputation due to osteomyelitis in 2021 and left great toe amputation 2022 with current wound VAC who presents with the following:    Hospital Problems           Last Modified POA    * (Principal) Chest pain 2022 Yes        # Chest pain with associated SOB/ACS rule out. #Acute respiratory failure with hypoxia. Likely due to panic/anxiety attack. Reports he lost his mother yesterday. Unresponsive on route but maintained pulse. Also noted to desat to 62s. -Initial troponin negative. -EKG nonacute. -CTA chest without acute PE.  -Echo from  9; normal left ventricular systolic function. EF of 55% no valvular disease or pericardial effusions.  -Admit to cardiac stepdown with telemetry.  -Serial troponin.  -Repeat EKG in AM.  -Nitro as needed.  -Home aspirin, statin and BB resumed  -Consult cardiology for further management.  -He denies chest pain at time of this exam.  -Consider anxiolytic as needed when indicated. -Supplemental oxygen as needed. Currently weaned to 2 L via nasal cannula. #Mild hyponatremia. Sodium of 132. Likely volume depletion. Start normal saline at 100 cc/h. Repeat BMP pending. If worsens consider nephrology consult. No evidence of neurologic deficits. #Hypokalemia. Potassium of 3.4. Replace. #Elevated lactate of 2.4. No white count. Afebrile. Currently on cefazolin for osteomyelitis. Continue. IV fluids. Repeat lactate in a.m.     #Essential hypertension-Home losartan and metoprolol resumed. # Iron and vitamin B12 deficiency anemia. Likely chronic. Hemoglobin of 7.4. Was 10.1 on 5/1 7. This could be contributing to hypoxia. Will give 1 unit packed RBCs. Continue vitamin B12 supplementation. # Right diabetic foot infection status post right toes amputation due to osteomyelitis in January 2021. # Left great toe amputation 4/23/2022 with current wound VAC by Dr. Alfonzo Romero  -Resume home  cefazolin 3 times daily until 6/4/2022.  -Dr. Vanesa Miguel consulted for wound management. # Uncontrolled type 2 diabetes, insulin-dependent and with complications as above. Hemoglobin A1c of 10.4 on 4/26. Resume home Lantus. Holding home oral hypoglycemics. Start SSI. Diabetic diet. Hypoglycemic protocol as needed    #History of peroneal vein occlusion DVT. Off anticoagulation from last admission. Per DC summary on 4/27 \" Given his current situation, the location of the blood clot, I think we can cautiously watch him and discharging him without therapeutic anticoagulation. \"    Disposition:   Current Living situation: Home  Expected Disposition: Home  Estimated D/C: 2 to 3 days    Diet No diet orders on file   DVT Prophylaxis [x] Lovenox, []  Heparin, [] SCDs, [] Ambulation,  [] Eliquis, [] Xarelto   Code Status Prior   Surrogate Decision Maker/ POA none     History from:     patient    History of Present Illness:     Chief Complaint: Chest pain  Dee Dee Brian is a 39 y.o. male with pmh of uncontrolled type II DM, dyslipidemia, hypertension, peroneal thrombus, chronic back pain status postlaminectomy, diabetic foot infections status post right toes amputation due to osteomyelitis in January 2021 and left great toe amputation 4/23/2022 with current wound VAC who presents with acute onset left-sided chest pain, nonradiating and described as sharp. States he cannot tolerate it when it started. But denies chest pain at the time of this exam.  He reports associated SOB.   He reports some anxiety at onset of symptoms. He states that he lost his mother yesterday which could be causing his symptoms. He was noted to be hypoxic and unresponsive in route to the ED requiring oxygen supplementation. He had similar hypoxic episode at the ER but was protecting his airway. He denies fever, chills, nausea, vomiting, diarrhea and constipation. Patient appears emotional.      Review of Systems: Need 10 Elements   Review of Systems    Reviewed and included in HPI. Objective: Intake/Output Summary (Last 24 hours) at 5/21/2022 0446  Last data filed at 5/21/2022 0353  Gross per 24 hour   Intake 1000 ml   Output 1500 ml   Net -500 ml      Vitals:   Vitals:    05/21/22 0032 05/21/22 0127 05/21/22 0132 05/21/22 0232   BP: 131/79 138/79 (!) 143/80 (!) 145/96   Pulse: 107 98 96 106   Resp: 18 25 22 20   Temp:       TempSrc:       SpO2: 98% 97% 99% 99%       Medications Prior to Admission     Prior to Admission medications    Medication Sig Start Date End Date Taking? Authorizing Provider   collagenase 250 UNIT/GM ointment Apply topically daily.  5/11/22 5/21/22  Billy Pino MD   Alcohol Swabs PADS  4/27/22   Historical Provider, MD   insulin glargine (LANTUS SOLOSTAR) 100 UNIT/ML injection pen Inject 24 Units into the skin nightly 5/5/22 8/3/22  Gerhard Dance, PA   glyBURIDE (DIABETA) 5 MG tablet Take 2 tablets by mouth 2 times daily (with meals) 4/27/22 7/26/22  Renetta Clark MD   metFORMIN (GLUCOPHAGE) 500 MG tablet Take 2 tablets by mouth 2 times daily (with meals) Indications: Start tomorrow 03/14 4/27/22 10/24/22  Renetta Clark MD   pioglitazone (ACTOS) 30 MG tablet Take 1 tablet by mouth daily 4/27/22 7/26/22  Renetta Clark MD   atorvastatin (LIPITOR) 40 MG tablet Take 1 tablet by mouth nightly 4/27/22 7/26/22  Renetta Clark MD   losartan (COZAAR) 25 MG tablet Take 1 tablet by mouth daily 4/27/22   Renetta Clark MD   metoprolol succinate (TOPROL XL) 25 MG extended release tablet Take 1 tablet by mouth daily 4/27/22   Nichol Barnes MD   blood glucose monitor strips Test 2 times a day & as needed for symptoms of irregular blood glucose. Dispense sufficient amount for indicated testing frequency plus additional to accommodate PRN testing needs. 4/27/22   Nichol Barnes MD   omeprazole (PRILOSEC) 20 MG delayed release capsule Take 1 capsule by mouth once daily in the morning 4/27/22   Nichol Barnes MD   escitalopram (LEXAPRO) 10 MG tablet Take 1 tablet by mouth daily 4/27/22   Nichol Barnes MD   aspirin 81 MG tablet Take 1 tablet by mouth daily 4/27/22   Nichol Barnes MD   ceFAZolin (ANCEF) 1 g injection Infuse 2,000 mg intravenously every 8 hours 4/27/22 6/26/22  Nichol Barnes MD   vitamin B-12 2000 MCG tablet Take 1 tablet by mouth daily 4/28/22   Nichol Barnes MD   blood glucose monitor kit and supplies Dispense sufficient amount for indicated testing frequency plus additional to accommodate PRN testing needs. Dispense all needed supplies to include: monitor, strips, lancing device, lancets, control solutions, alcohol swabs. 6/15/20   Jennifer Greene MD   FreeStyle Lancets MISC 1 each by Does not apply route daily 6/15/20   Jennifer Greene MD   acetaminophen (TYLENOL) 325 MG tablet Take 2 tablets by mouth every 4 hours as needed for Pain or Fever 2/28/18   Darvin Bernard MD       Physical Exam: Need 8 Elements   Physical Exam     General: NAD. Eyes: EOMI  ENT: neck supple  Cardiovascular: Regular rate and rhythm. Respiratory: Clear to auscultation. No use of accessory muscles of respiration. Gastrointestinal: Soft, non tender nondistended. Genitourinary: no suprapubic tenderness  Musculoskeletal: No edema  Skin: warm, dry  Neuro: Alert. Oriented x4. No unilateral weakness. Psych: Sad mood. .       Past Medical History:   PMHx   Past Medical History:   Diagnosis Date    Abscess of left foot 4/22/2022    Anemia associated with acute blood loss 4/26/2022    Callus of foot     Right foot - see's Dr. Rachid Steven    Cellulitis of left foot 4/22/2022    Diabetic ulcer of left midfoot associated with type 2 diabetes mellitus, with muscle involvement without evidence of necrosis ([address] 4/26/2022    Diabetic ulcer of left midfoot associated with type 2 diabetes mellitus, with necrosis of muscle ([address] 6/7/2021    Diabetic ulcer of right midfoot associated with type 2 diabetes mellitus, with fat layer exposed ([address] 8/11/2020    Dizziness     positional    Essential hypertension     Follows with PCP    Hyperlipidemia     Lumbar radiculopathy     Septic embolism ([address] 6/13/2020    Subacute osteomyelitis of left foot ([address] 4/22/2022     PSHX:  has a past surgical history that includes Cardiac catheterization (03/2018); [place] tooth extraction; Dental surgery; pr office/outpt visit,procedure only (Left, 4/17/2018); lumbar laminectomy (2018); Toe amputation (Right, 1/8/2021); Achilles tendon surgery (Right, 1/8/2021); hernia repair (Bilateral, 5/27/2020); and Toe amputation (Left, 4/23/2022). Allergies: No Known Allergies  Fam HX:  family history includes Diabetes in his father and mother; Heart Attack in his mother; Heart Disease in his father and mother; Kidney Disease in his mother; Other in his mother.   Soc HX:   Social History     Socioeconomic History    Marital status: Single     Spouse name: None    Number of children: None    Years of education: None    Highest education level: None   Occupational History    None   Tobacco Use    Smoking status: Never Smoker    Smokeless tobacco: Never Used   Vaping Use    Vaping Use: Never used   Substance and Sexual Activity    Alcohol use: Yes     Comment: \"couple beers a night\"    Drug use: No    Sexual activity: Yes     Partners: Female   Other Topics Concern    None   Social History Narrative    None     Social Determinants of Health     Financial Resource Strain: Low Risk     <0.010 05/20/2022    TROPONINT <0.010 06/12/2021    TROPONINT <0.010 03/13/2021     Lactic Acid: No results for input(s): LACTA in the last 72 hours. BNP:   Recent Labs     05/20/22  2304   PROBNP 230.5     UA:  Lab Results   Component Value Date    NITRU NEGATIVE 05/21/2022    COLORU COLORLESS 05/21/2022    WBCUA <1 05/21/2022    RBCUA <1 05/21/2022    MUCUS RARE 04/18/2022    TRICHOMONAS NONE SEEN 04/18/2022    BACTERIA NEGATIVE 05/21/2022    CLARITYU CLEAR 05/21/2022    SPECGRAV <1.005 05/21/2022    LEUKOCYTESUR NEGATIVE 05/21/2022    UROBILINOGEN 0.2 05/21/2022    BILIRUBINUR NEGATIVE 05/21/2022    BLOODU TRACE 05/21/2022    KETUA NEGATIVE 05/21/2022     Urine Cultures: No results found for: Brando Kroner  Blood Cultures: No results found for: BC  No results found for: BLOODCULT2  Organism: No results found for: ORG    Imaging/Diagnostics Last 24 Hours   CTA PULMONARY W CONTRAST    Result Date: 5/21/2022  EXAMINATION: CTA OF THE CHEST 5/21/2022 1:18 am TECHNIQUE: CTA of the chest was performed after the administration of intravenous contrast.  Multiplanar reformatted images are provided for review. MIP images are provided for review. Automated exposure control, iterative reconstruction, and/or weight based adjustment of the mA/kV was utilized to reduce the radiation dose to as low as reasonably achievable. COMPARISON: June 12, 2021. HISTORY: ORDERING SYSTEM PROVIDED HISTORY: Pain, shortness of breath, syncope, history of DVT, rule out PE or pneumonia TECHNOLOGIST PROVIDED HISTORY: Reason for exam:->Pain, shortness of breath, syncope, history of DVT, rule out PE or pneumonia Decision Support Exception - unselect if not a suspected or confirmed emergency medical condition->Emergency Medical Condition (MA) Reason for Exam: Pain, shortness of breath, syncope, history of DVT, FINDINGS: Pulmonary Arteries: Pulmonary arteries are adequately opacified for evaluation.   No evidence of intraluminal filling defect to suggest pulmonary embolism. Main pulmonary artery is normal in caliber. Mediastinum: No evidence of mediastinal lymphadenopathy. The heart and pericardium demonstrate no acute abnormality. There is no acute abnormality of the thoracic aorta. Lungs/pleura: The lungs are without acute process. No focal consolidation or pulmonary edema. No evidence of pleural effusion or pneumothorax. Upper Abdomen: Limited images of the upper abdomen are unremarkable. Soft Tissues/Bones: No acute bone or soft tissue abnormality. No evidence of pulmonary embolism or acute pulmonary abnormality.        Personally reviewed Lab Studies, Imaging, and discussed case with Dr. Blas Hicks    Electronically signed by RAFAEL Robles CNP on 5/21/2022 at 4:46 AM

## 2022-05-21 NOTE — ED NOTES
Pt desats to 68% on RA, NRB applied and Dr. Roxana Tate called to bedside.  VO for 2 mg Narcan IV given, Narcan administered, spO2 improves to 100%, pt placed on [employer], RN  05/20/22 2188

## 2022-05-21 NOTE — PROGRESS NOTES
Pt had home wound vac on L foot and wound vac lost battery charge due to pt not having the power cord and no one being able to bring it to him. Notified Dr. Loreta Huff and he said to remove wound vac and apply WTD dressing. Removed pts wound vac and applied WTD dressing to L foot. Pt tolerated.     Ananda Romero RN

## 2022-05-24 ENCOUNTER — HOSPITAL ENCOUNTER (OUTPATIENT)
Age: 42
Setting detail: SPECIMEN
Discharge: HOME | End: 2022-05-24
Payer: COMMERCIAL

## 2022-05-24 LAB
ALBUMIN SERPL-MCNC: 3.6 GM/DL (ref 3.4–5)
ALP BLD-CCNC: 110 IU/L (ref 40–128)
ALT SERPL-CCNC: 8 U/L (ref 10–40)
ANION GAP SERPL CALCULATED.3IONS-SCNC: 16 MMOL/L (ref 4–16)
AST SERPL-CCNC: 14 IU/L (ref 15–37)
BASOPHILS ABSOLUTE: 0 K/CU MM
BASOPHILS RELATIVE PERCENT: 0.5 % (ref 0–1)
BILIRUB SERPL-MCNC: 0.2 MG/DL (ref 0–1)
BUN BLDV-MCNC: 7 MG/DL (ref 6–23)
C-REACTIVE PROTEIN, HIGH SENSITIVITY: 11.6 MG/L
CALCIUM SERPL-MCNC: 8.4 MG/DL (ref 8.3–10.6)
CHLORIDE BLD-SCNC: 99 MMOL/L (ref 99–110)
CO2: 21 MMOL/L (ref 21–32)
CREAT SERPL-MCNC: 0.8 MG/DL (ref 0.9–1.3)
DIFFERENTIAL TYPE: ABNORMAL
EOSINOPHILS ABSOLUTE: 0.3 K/CU MM
EOSINOPHILS RELATIVE PERCENT: 3.5 % (ref 0–3)
ERYTHROCYTE SEDIMENTATION RATE: 58 MM/HR (ref 0–15)
GFR AFRICAN AMERICAN: >60 ML/MIN/1.73M2
GFR NON-AFRICAN AMERICAN: >60 ML/MIN/1.73M2
GLUCOSE BLD-MCNC: 206 MG/DL (ref 70–99)
HCT VFR BLD CALC: 33.4 % (ref 42–52)
HEMOGLOBIN: 10.1 GM/DL (ref 13.5–18)
IMMATURE NEUTROPHIL %: 0.2 % (ref 0–0.43)
LYMPHOCYTES ABSOLUTE: 1.9 K/CU MM
LYMPHOCYTES RELATIVE PERCENT: 23.5 % (ref 24–44)
MAGNESIUM: 1.7 MG/DL (ref 1.8–2.4)
MCH RBC QN AUTO: 29.9 PG (ref 27–31)
MCHC RBC AUTO-ENTMCNC: 30.2 % (ref 32–36)
MCV RBC AUTO: 98.8 FL (ref 78–100)
MONOCYTES ABSOLUTE: 0.7 K/CU MM
MONOCYTES RELATIVE PERCENT: 8.5 % (ref 0–4)
NUCLEATED RBC %: 0 %
PDW BLD-RTO: 14.6 % (ref 11.7–14.9)
PHOSPHORUS: 4.4 MG/DL (ref 2.5–4.9)
PLATELET # BLD: 519 K/CU MM (ref 140–440)
PMV BLD AUTO: 9.2 FL (ref 7.5–11.1)
POTASSIUM SERPL-SCNC: 4.1 MMOL/L (ref 3.5–5.1)
RBC # BLD: 3.38 M/CU MM (ref 4.6–6.2)
SEGMENTED NEUTROPHILS ABSOLUTE COUNT: 5.1 K/CU MM
SEGMENTED NEUTROPHILS RELATIVE PERCENT: 63.8 % (ref 36–66)
SODIUM BLD-SCNC: 136 MMOL/L (ref 135–145)
TOTAL IMMATURE NEUTOROPHIL: 0.02 K/CU MM
TOTAL NUCLEATED RBC: 0 K/CU MM
TOTAL PROTEIN: 7.4 GM/DL (ref 6.4–8.2)
WBC # BLD: 8 K/CU MM (ref 4–10.5)

## 2022-05-24 PROCEDURE — 85652 RBC SED RATE AUTOMATED: CPT

## 2022-05-24 PROCEDURE — 85025 COMPLETE CBC W/AUTO DIFF WBC: CPT

## 2022-05-24 PROCEDURE — 86140 C-REACTIVE PROTEIN: CPT

## 2022-05-24 PROCEDURE — 80053 COMPREHEN METABOLIC PANEL: CPT

## 2022-05-24 PROCEDURE — 84100 ASSAY OF PHOSPHORUS: CPT

## 2022-05-24 PROCEDURE — 83735 ASSAY OF MAGNESIUM: CPT

## 2022-05-25 ENCOUNTER — HOSPITAL ENCOUNTER (OUTPATIENT)
Dept: WOUND CARE | Age: 42
Discharge: HOME | End: 2022-05-25
Payer: COMMERCIAL

## 2022-05-25 VITALS
TEMPERATURE: 98.6 F | SYSTOLIC BLOOD PRESSURE: 121 MMHG | RESPIRATION RATE: 18 BRPM | DIASTOLIC BLOOD PRESSURE: 85 MMHG | HEART RATE: 94 BPM

## 2022-05-25 DIAGNOSIS — L97.523 DIABETIC ULCER OF TOE OF LEFT FOOT ASSOCIATED WITH TYPE 2 DIABETES MELLITUS, WITH NECROSIS OF MUSCLE (HCC): ICD-10-CM

## 2022-05-25 DIAGNOSIS — E11.621 DIABETIC ULCER OF TOE OF LEFT FOOT ASSOCIATED WITH TYPE 2 DIABETES MELLITUS, WITH NECROSIS OF MUSCLE (HCC): ICD-10-CM

## 2022-05-25 DIAGNOSIS — E11.621 DIABETIC ULCER OF RIGHT MIDFOOT ASSOCIATED WITH TYPE 2 DIABETES MELLITUS, WITH FAT LAYER EXPOSED (HCC): Primary | ICD-10-CM

## 2022-05-25 DIAGNOSIS — L97.412 DIABETIC ULCER OF RIGHT MIDFOOT ASSOCIATED WITH TYPE 2 DIABETES MELLITUS, WITH FAT LAYER EXPOSED (HCC): Primary | ICD-10-CM

## 2022-05-25 LAB
ABO/RH: NORMAL
ANTIBODY SCREEN: NEGATIVE
COMPONENT: NORMAL
CROSSMATCH RESULT: NORMAL
STATUS: NORMAL
TRANSFUSION STATUS: NORMAL
UNIT DIVISION: 0
UNIT NUMBER: NORMAL

## 2022-05-25 PROCEDURE — 11046 DBRDMT MUSC&/FSCA EA ADDL: CPT

## 2022-05-25 PROCEDURE — 11043 DBRDMT MUSC&/FSCA 1ST 20/<: CPT

## 2022-05-25 PROCEDURE — 11042 DBRDMT SUBQ TIS 1ST 20SQCM/<: CPT

## 2022-05-25 PROCEDURE — 97605 NEG PRS WND THER DME<=50SQCM: CPT

## 2022-05-25 NOTE — PROGRESS NOTES
Wound Care Center Progress Note With Procedure    Dee Dee Brian  AGE: 39 y.o. GENDER: male  : 1980  EPISODE DATE:  2022     Subjective:     Chief Complaint   Patient presents with    Wound Check     left foot         HISTORY of PRESENT ILLNESS      Dee Dee Brian is a 39 y.o. male who presents today for wound evaluation of Chronic diabetic ulcer(s) of the left medial foot, left lateral plantar surface, right medial plantar surface. The ulcer is of marked severity. The underlying cause of the wound is diabetes. Improving. NPWT was applied last week.   Wound Pain Timing/Severity: intermittent, mild  Quality of pain: aching  Severity of pain:  2 / 10   Modifying Factors: diabetes and chronic pressure  Associated Signs/Symptoms: edema and drainage        PAST MEDICAL HISTORY        Diagnosis Date    Abscess of left foot 2022    Anemia associated with acute blood loss 2022    Callus of foot     Right foot - see's Dr. Matt Villareal    Cellulitis of left foot 2022    Diabetic ulcer of left midfoot associated with type 2 diabetes mellitus, with muscle involvement without evidence of necrosis ([address] 2022    Diabetic ulcer of left midfoot associated with type 2 diabetes mellitus, with necrosis of muscle ([address] 2021    Diabetic ulcer of right midfoot associated with type 2 diabetes mellitus, with fat layer exposed ([address] 2020    Dizziness     positional    Essential hypertension     Follows with PCP    Hyperlipidemia     Lumbar radiculopathy     Septic embolism ([address] 2020    Subacute osteomyelitis of left foot ([address] 2022       PAST SURGICAL HISTORY    Past Surgical History:   Procedure Laterality Date    ACHILLES TENDON SURGERY Right 2021    RIGHT ACHILLES TENDON LENGTHENING REPAIR performed by Saul Whalen DPM at [address]  2018    [facility]    DENTAL SURGERY      teeth extractions -half per patient    HERNIA REPAIR Bilateral 5/27/2020    BIALTERAL HERNIA INGUINAL REPAIR performed by Cele Ernst MD at [address] [facility]  2018    [place] OFFICE/OUTPT VISIT,PROCEDURE ONLY Left 4/17/2018    L5-S1 HEMILAMINECTOMY, REMOVAL OF DISC LEFT SIDE performed by Ventura Zavala MD at [address] [facility] Right 1/8/2021    RIGHT TRANSMETATARSAL TOE AMPUTATION performed by Janet Callejas DPM at [facility] TOE AMPUTATION Left 4/23/2022    LEFT RAY GREAT TOE AMPUTATION performed by Khadar Palomo MD at [address] [facility] EXTRACTION         FAMILY HISTORY    Family History   Problem Relation Age of Onset    Heart Disease Father     Diabetes Father     Diabetes Mother     Heart Disease Mother     Other Mother     Kidney Disease Mother     Heart Attack Mother        SOCIAL HISTORY    Social History     Tobacco Use    Smoking status: Never Smoker    Smokeless tobacco: Never Used   Vaping Use    Vaping Use: Never used   Substance Use Topics    Alcohol use: Yes     Comment: \"couple beers a night\"    Drug use: No       ALLERGIES    No Known Allergies    MEDICATIONS    Current Outpatient Medications on File Prior to Encounter   Medication Sig Dispense Refill    Alcohol Swabs PADS       insulin glargine (LANTUS SOLOSTAR) 100 UNIT/ML injection pen Inject 24 Units into the skin nightly 3 pen 2    metFORMIN (GLUCOPHAGE) 500 MG tablet Take 2 tablets by mouth 2 times daily (with meals) Indications: Start tomorrow 03/14 360 tablet 1    pioglitazone (ACTOS) 30 MG tablet Take 1 tablet by mouth daily 90 tablet 1    atorvastatin (LIPITOR) 40 MG tablet Take 1 tablet by mouth nightly 90 tablet 1    losartan (COZAAR) 25 MG tablet Take 1 tablet by mouth daily 30 tablet 5    metoprolol succinate (TOPROL XL) 25 MG extended release tablet Take 1 tablet by mouth daily 90 tablet 1    blood glucose monitor strips Test 2 times a day & as needed for symptoms of irregular blood glucose.  Dispense sufficient amount for indicated testing frequency plus additional to accommodate PRN testing needs. 100 strip 0    omeprazole (PRILOSEC) 20 MG delayed release capsule Take 1 capsule by mouth once daily in the morning 90 capsule 1    escitalopram (LEXAPRO) 10 MG tablet Take 1 tablet by mouth daily 30 tablet 5    aspirin 81 MG tablet Take 1 tablet by mouth daily 30 tablet 3    ceFAZolin (ANCEF) 1 g injection Infuse 2,000 mg intravenously every 8 hours 180 each 1    vitamin B-12 2000 MCG tablet Take 1 tablet by mouth daily 30 tablet 3    blood glucose monitor kit and supplies Dispense sufficient amount for indicated testing frequency plus additional to accommodate PRN testing needs. Dispense all needed supplies to include: monitor, strips, lancing device, lancets, control solutions, alcohol swabs. 1 kit 0    FreeStyle Lancets MISC 1 each by Does not apply route daily 100 each 3    acetaminophen (TYLENOL) 325 MG tablet Take 2 tablets by mouth every 4 hours as needed for Pain or Fever 120 tablet 3     No current facility-administered medications on file prior to encounter. REVIEW OF SYSTEMS    Pertinent items are noted in HPI. Constitutional: Negative for systemic symptoms including fever, chills and malaise. Objective:      /85   Pulse 94   Temp 98.6 °F (37 °C) (Temporal)   Resp 18     PHYSICAL EXAM      General: The patient is in no acute distress. Mental status:  Patient is appropriate, is  oriented to place and plan of care.   Dermatologic exam: Visual inspection of the periwound reveals the skin to be moist  Wound exam: see wound description below in procedure note      Assessment:     Problem List Items Addressed This Visit     WD-Diabetic ulcer of toe of left foot associated with type 2 diabetes mellitus, with necrosis of muscle ([address]    WD-Diabetic ulcer of right midfoot associated with type 2 diabetes mellitus, with fat layer exposed ([address] - Primary        Procedure Note    Indications: Based on my examination of this patient's wound(s) today, sharp excision into necrotic subcutaneous tissue and muscle/fascia is required to promote healing and evaluate the extent of previous healing. Performed by: Florina Herrera MD    Consent obtained: Yes    Time out taken:  Yes    Pain Control:       Debridement:Excisional Debridement    Using scissors, forceps and # 10 blade scalpel the wound(s) was/were sharply debrided down through and including the removal of subcutaneous tissue and muscle/fascia. Devitalized Tissue Debrided:  slough, exudate and callus    Pre Debridement Measurements:  Are located in the Wound Documentation Flow Sheet    All active wounds listed below with today's date are evaluated  Wound(s)    debrided this date include # : 1, 2 and 3     Post  Debridement Measurements:  Incision 04/17/18 Back (Active)   Number of days: 3394       Wound 06/13/20 Tibial Right pt states reddened and rash like after a sunburn 6/11/2020 (Active)   Number of days: 710       Wound 05/11/22 #1 (onset 2 weeks) Left Medial Foot Amp Site (Active)   Wound Image   05/11/22 1027   Wound Etiology Surgical 05/25/22 1015   Dressing Status New dressing applied;Clean;Dry; Intact 05/21/22 1600   Wound Cleansed Soap and water 05/25/22 1015   Dressing/Treatment Alginate 05/18/22 1123   Offloading for Diabetic Foot Ulcers Walker 05/11/22 1027   Wound Length (cm) 9.7 cm 05/25/22 1015   Wound Width (cm) 4.3 cm 05/25/22 1015   Wound Depth (cm) 0.5 cm 05/25/22 1015   Wound Surface Area (cm^2) 41.71 cm^2 05/25/22 1015   Change in Wound Size % (l*w) 18.93 05/25/22 1015   Wound Volume (cm^3) 20.855 cm^3 05/25/22 1015   Wound Healing % 19 05/25/22 1015   Post-Procedure Length (cm) 9.7 cm 05/25/22 1055   Post-Procedure Width (cm) 4.2 cm 05/25/22 1055   Post-Procedure Depth (cm) 0.5 cm 05/25/22 1055   Post-Procedure Surface Area (cm^2) 40.74 cm^2 05/25/22 1055   Post-Procedure Volume (cm^3) 20.37 cm^3 05/25/22 1055 Distance Tunneling (cm) 0 cm 05/25/22 1015   Tunneling Position ___ O'Clock 0 05/25/22 1015   Undermining Starts ___ O'Clock 0 05/25/22 1015   Undermining Ends___ O'Clock 0 05/25/22 1015   Undermining Maxium Distance (cm) 0 05/25/22 1015   Wound Assessment Pink/red;Slough 05/25/22 1015   Drainage Amount Moderate 05/25/22 1015   Drainage Description Serosanguinous 05/25/22 1015   Odor None 05/25/22 1015   Rosalie-wound Assessment Intact 05/25/22 1015   Margins Defined edges 05/25/22 1015   Wound Thickness Description not for Pressure Injury Full thickness 05/25/22 1015   Number of days: 14       Wound 05/11/22 #2 (onset unknown) Left Plantar (Active)   Wound Image   05/11/22 1027   Dressing Status New dressing applied;Clean;Dry; Intact 05/21/22 1600   Wound Cleansed Soap and water 05/25/22 1015   Dressing/Treatment Alginate 05/18/22 1123   Offloading for Diabetic Foot Ulcers Walker 05/18/22 1027   Wound Length (cm) 1.5 cm 05/25/22 1015   Wound Width (cm) 1.5 cm 05/25/22 1015   Wound Depth (cm) 0.2 cm 05/25/22 1015   Wound Surface Area (cm^2) 2.25 cm^2 05/25/22 1015   Change in Wound Size % (l*w) -40.62 05/25/22 1015   Wound Volume (cm^3) 0.45 cm^3 05/25/22 1015   Wound Healing % -41 05/25/22 1015   Post-Procedure Length (cm) 1.5 cm 05/25/22 1055   Post-Procedure Width (cm) 1.5 cm 05/25/22 1055   Post-Procedure Depth (cm) 0.1 cm 05/25/22 1055   Post-Procedure Surface Area (cm^2) 2.25 cm^2 05/25/22 1055   Post-Procedure Volume (cm^3) 0.225 cm^3 05/25/22 1055   Distance Tunneling (cm) 0 cm 05/25/22 1015   Tunneling Position ___ O'Clock 0 05/25/22 1015   Undermining Starts ___ O'Clock 0 05/25/22 1015   Undermining Ends___ O'Clock 0 05/25/22 1015   Undermining Maxium Distance (cm) 0 05/25/22 1015   Wound Assessment Pink/red;Slough 05/25/22 1015   Drainage Amount Moderate 05/25/22 1015   Drainage Description Yellow 05/25/22 1015   Odor None 05/25/22 1015   Rosalie-wound Assessment Fragile 05/25/22 1015   Margins Defined edges 05/25/22 1015   Wound Thickness Description not for Pressure Injury Full thickness 05/25/22 1015   Number of days: 14       Wound 05/11/22 #3 (onset unknown) Right Plantar (Active)   Wound Image   05/11/22 1027   Dressing Status New dressing applied;Clean;Dry; Intact 05/21/22 1600   Wound Cleansed Soap and water; Wound cleanser 05/25/22 1015   Offloading for Diabetic Foot Ulcers Walker 05/18/22 1027   Wound Length (cm) 1 cm 05/25/22 1015   Wound Width (cm) 0.3 cm 05/25/22 1015   Wound Depth (cm) 0.4 cm 05/25/22 1015   Wound Surface Area (cm^2) 0.3 cm^2 05/25/22 1015   Change in Wound Size % (l*w) 77.78 05/25/22 1015   Wound Volume (cm^3) 0.12 cm^3 05/25/22 1015   Wound Healing % 78 05/25/22 1015   Post-Procedure Length (cm) 1 cm 05/25/22 1055   Post-Procedure Width (cm) 0.2 cm 05/25/22 1055   Post-Procedure Depth (cm) 0.3 cm 05/25/22 1055   Post-Procedure Surface Area (cm^2) 0.2 cm^2 05/25/22 1055   Post-Procedure Volume (cm^3) 0.06 cm^3 05/25/22 1055   Distance Tunneling (cm) 0 cm 05/18/22 1027   Tunneling Position ___ O'Clock 0 05/18/22 1027   Undermining Starts ___ O'Clock 0 05/18/22 1027   Undermining Ends___ O'Clock 0 05/18/22 1027   Undermining Maxium Distance (cm) 0 05/18/22 1027   Wound Assessment Pink/red 05/25/22 1015   Drainage Amount Moderate 05/25/22 1015   Drainage Description Serosanguinous 05/25/22 1015   Odor None 05/25/22 1015   Rosalie-wound Assessment Fragile 05/25/22 1015   Margins Defined edges 05/25/22 1015   Wound Thickness Description not for Pressure Injury Full thickness 05/25/22 1015   Number of days: 14       Percent of Wound(s) Debrided: approximately 100%    Total  Area  Debrided:  43 sq cm     Bleeding:  Minimal    Hemostasis Achieved:  not needed    Procedural Pain:  3  / 10     Post Procedural Pain:  0 / 10     Response to treatment:  Well tolerated by patient. Status of wound progress and description from last visit:   Slowly improving.       Plan:       Discharge Instructions PHYSICIAN ORDERS AND DISCHARGE INSTRUCTIONS     NOTE: Upon discharge from the [address], you will receive a patient experience survey. We would be grateful if you would take the time to fill this survey out.     Wound care order history:                 YADY's   Right       Left               Date:     Continuing wound care orders and information:                 Residence:  Private              Continue home health care with: PICC LINE ONLY                Your wound-care supplies will be provided by:      Wound cleansing:               IK not scrub or use excessive force.              Wash hands with soap and water before and after dressing changes.             XDANX to applying a clean dressing, cleanse wound with normal saline, wound cleanser, or mild soap and water.               Ask the physician or nurse before getting the wound(s) wet in a shower     Daily Wound management:              Keep weight off wounds and reposition every 2 hours.              Avoid standing for long periods of time.              Apply wraps/stockings in AM and remove at bedtime.              If swelling is present, elevate legs to the level of the heart or above for 30 minutes 4-5 times a day and/or when sitting.                                      When taking antibiotics take entire prescription as ordered by physician do not stop taking until medicine is all gone.                              Wound Care Notes:   Rx: [address] [facility]  Right and Left foot wounds cultured 11/4/21  Apply for wound vac 05/11/22 will arrive 05/18/22  Apply for grafts 05/11/22  Santyl ordered 05/11/22                                 Orders for this week:  05/25/22                 FAX ORDERS TO [facility]! Right AND LEFT PLANTAR Plantar Foot  Wash with soap and water, pat dry. APPLY ANSASEPT AND STIMULEN TO WOUND BED   COVER WITH SORBEX AND CONFORM      Left Foot Toe amp site  -   Wash with soap and water, pat dry.    APPLY SANTYL TO WOUND BED  WOUND VAC THERAPY: left foot toe amputation site: . As stated below  DUODERM TO PERIWOUND FOR PROTECTION. APPLY BLACK FOAM TO WOUND.  MAY USE MEPITEL TO WOUND BED TO PREVENT BLACK FOAM FROM ADHERING TO WOUND BED. SECURE VAC DRESSING WITH DRAPE. SET WOUND VAC  CONTINUOUS SUCTION. CANISTER CHANGE WEEKLY OR ACCORDING TO VOLUME OF DRAINAGE.     WOUND VAC DRESSING TO BE CHANGED MON & FRI BY HOME CARE  WOUND CLINIC WILL CHANGE ON WEDNESDAYS.     Follow up in 1 week in the wound care center. Call [phone] for any questions or concerns.   Date__________   Time____________        Treatment Note      Written Patient Dismissal Instructions Given            Electronically signed by Lia Mariano MD on 5/25/2022 at 11:23 AM

## 2022-05-25 NOTE — PROGRESS NOTES
Negative Pressure    NAME:  Javi Fried  YOB: 1980  MEDICAL RECORD NUMBER:  [id number]  DATE:  5/25/2022     Applied Negative Pressure to LEFT FOOT wound(s)/ulcer(s).  [x] Applied skin barrier prep to serenity-wound.  [x] Cut strips of plastic drape to picture frame wound so that serenity-wound is     covered with the drape.  [x] If bridging dressing to less prominent site, cover any intact skin that will come in contact with the Negative Pressure Therapy sponge, gauze or channel drain with plastic drape. The sponge should never touch intact skin.  [x] Cut sponge, gauze or channel drain to size which will fit into the wound/ulcer bed without being forced.  [x] Be sure the sponge is large enough to hold the entire round plastic flange which is attached to the tubing. Never allow flange to be larger than the sponge or it will produce suction damaging intact skin.  Total number of individual pieces of foam used within the wound bed: 1     [x] If bridging the dressing away from the primary site, be sure the bridge leads to a piece of sponge large enough to hold the entire flange without allowing any of the flange to overlap onto intact skin.  [x] Covered sponge, gauze or channel drain with plastic drape.  [x] Cut a hole in this plastic drape directly over the sponge the same size as the plastic drain tubing.  [x] Removed plastic liner from flange and apply it directly over the hole you cut.  [x] Removed the plastic cover from the flange.  [x] Attached the tubing to the wound/ulcer Negative Pressure Therapy and turn it on to be sure a vacuum is created and that there are no leaks.  [x] If air leaks occur, use plastic drape to patch them.  [x] Secured Negative Pressure Therapy dressing with ace wrap loosely if located on an extremity. Maintain tubing outside of ace wrap. Tubing must not exert pressure on intact skin.     Applied per [facility] Guidelines      Electronically signed by Brandon Espinoza RN on 5/25/2022 at 11:31 AM

## 2022-05-26 LAB
CULTURE: NORMAL
CULTURE: NORMAL
Lab: NORMAL
Lab: NORMAL
SPECIMEN: NORMAL
SPECIMEN: NORMAL

## 2022-05-27 ENCOUNTER — TELEPHONE (OUTPATIENT)
Dept: FAMILY MEDICINE CLINIC | Age: 42
End: 2022-05-27

## 2022-05-27 NOTE — TELEPHONE ENCOUNTER
Kendra Goes called and had feelings of ending his life. He feels anxious and depressed. I spoke to Dr. Remigio Nuñez and he advised ER. I explained to Kendra Goes what they do for mental health at the ER. He said if his feelings become any worse that he will go to the ER. I talked to him about what the suicide hotline was and told him the easiest way to get  It was to google suicide hotline and it pops up at the top. I told him he can speak to them 24/7. I also told him if he needed anything from me to let me know. I will help if I can. We had no open spots on any providers schedule.

## 2022-05-31 ENCOUNTER — TELEPHONE (OUTPATIENT)
Dept: FAMILY MEDICINE CLINIC | Age: 42
End: 2022-05-31

## 2022-05-31 ENCOUNTER — TELEPHONE (OUTPATIENT)
Dept: INFECTIOUS DISEASES | Age: 42
End: 2022-05-31

## 2022-05-31 NOTE — TELEPHONE ENCOUNTER
Will from [facility] called and stated that patient is having increased depression. Patient mother passed away. Can the anti depressant be increase.   Call Will and advise

## 2022-06-01 ENCOUNTER — HOSPITAL ENCOUNTER (OUTPATIENT)
Dept: WOUND CARE | Age: 42
Discharge: HOME | End: 2022-06-01

## 2022-06-01 NOTE — TELEPHONE ENCOUNTER
CALLED WILL FROM [facility] , UNDERSTANDING VOICED .   CALLED [facility] , NO ANSWER , NO VM AVAILABLE

## 2022-06-03 ENCOUNTER — OFFICE VISIT (OUTPATIENT)
Dept: FAMILY MEDICINE CLINIC | Age: 42
End: 2022-06-03
Payer: COMMERCIAL

## 2022-06-03 VITALS
OXYGEN SATURATION: 95 % | SYSTOLIC BLOOD PRESSURE: 132 MMHG | BODY MASS INDEX: 24.92 KG/M2 | HEART RATE: 86 BPM | WEIGHT: 184 LBS | DIASTOLIC BLOOD PRESSURE: 70 MMHG | HEIGHT: 72 IN

## 2022-06-03 DIAGNOSIS — R45.851 PASSIVE SUICIDAL IDEATIONS: ICD-10-CM

## 2022-06-03 DIAGNOSIS — F43.21 GRIEF: ICD-10-CM

## 2022-06-03 DIAGNOSIS — F41.9 ANXIETY: Primary | ICD-10-CM

## 2022-06-03 DIAGNOSIS — F32.2 CURRENT SEVERE EPISODE OF MAJOR DEPRESSIVE DISORDER WITHOUT PSYCHOTIC FEATURES WITHOUT PRIOR EPISODE (HCC): ICD-10-CM

## 2022-06-03 PROCEDURE — G8427 DOCREV CUR MEDS BY ELIG CLIN: HCPCS | Performed by: PHYSICIAN ASSISTANT

## 2022-06-03 PROCEDURE — 99215 OFFICE O/P EST HI 40 MIN: CPT | Performed by: PHYSICIAN ASSISTANT

## 2022-06-03 PROCEDURE — 1036F TOBACCO NON-USER: CPT | Performed by: PHYSICIAN ASSISTANT

## 2022-06-03 PROCEDURE — G8420 CALC BMI NORM PARAMETERS: HCPCS | Performed by: PHYSICIAN ASSISTANT

## 2022-06-03 RX ORDER — CITALOPRAM 20 MG/1
20 TABLET ORAL DAILY
Qty: 30 TABLET | Refills: 3 | Status: SHIPPED | OUTPATIENT
Start: 2022-06-03 | End: 2022-06-30 | Stop reason: ALTCHOICE

## 2022-06-03 RX ORDER — LORAZEPAM 0.5 MG/1
0.5 TABLET ORAL NIGHTLY PRN
Qty: 30 TABLET | Refills: 0 | Status: SHIPPED | OUTPATIENT
Start: 2022-06-03 | End: 2022-07-03

## 2022-06-03 ASSESSMENT — PATIENT HEALTH QUESTIONNAIRE - PHQ9
1. LITTLE INTEREST OR PLEASURE IN DOING THINGS: 0
8. MOVING OR SPEAKING SO SLOWLY THAT OTHER PEOPLE COULD HAVE NOTICED. OR THE OPPOSITE, BEING SO FIGETY OR RESTLESS THAT YOU HAVE BEEN MOVING AROUND A LOT MORE THAN USUAL: 0
4. FEELING TIRED OR HAVING LITTLE ENERGY: 3
SUM OF ALL RESPONSES TO PHQ QUESTIONS 1-9: 18
10. IF YOU CHECKED OFF ANY PROBLEMS, HOW DIFFICULT HAVE THESE PROBLEMS MADE IT FOR YOU TO DO YOUR WORK, TAKE CARE OF THINGS AT HOME, OR GET ALONG WITH OTHER PEOPLE: 3
SUM OF ALL RESPONSES TO PHQ QUESTIONS 1-9: 15
6. FEELING BAD ABOUT YOURSELF - OR THAT YOU ARE A FAILURE OR HAVE LET YOURSELF OR YOUR FAMILY DOWN: 0
SUM OF ALL RESPONSES TO PHQ QUESTIONS 1-9: 18
7. TROUBLE CONCENTRATING ON THINGS, SUCH AS READING THE NEWSPAPER OR WATCHING TELEVISION: 3
SUM OF ALL RESPONSES TO PHQ9 QUESTIONS 1 & 2: 3
SUM OF ALL RESPONSES TO PHQ QUESTIONS 1-9: 18
5. POOR APPETITE OR OVEREATING: 3
9. THOUGHTS THAT YOU WOULD BE BETTER OFF DEAD, OR OF HURTING YOURSELF: 3
2. FEELING DOWN, DEPRESSED OR HOPELESS: 3
3. TROUBLE FALLING OR STAYING ASLEEP: 3

## 2022-06-03 ASSESSMENT — COLUMBIA-SUICIDE SEVERITY RATING SCALE - C-SSRS
5. HAVE YOU STARTED TO WORK OUT OR WORKED OUT THE DETAILS OF HOW TO KILL YOURSELF? DO YOU INTEND TO CARRY OUT THIS PLAN?: NO
3. HAVE YOU BEEN THINKING ABOUT HOW YOU MIGHT KILL YOURSELF?: YES
2. HAVE YOU ACTUALLY HAD ANY THOUGHTS OF KILLING YOURSELF?: YES
4. HAVE YOU HAD THESE THOUGHTS AND HAD SOME INTENTION OF ACTING ON THEM?: NO
1. WITHIN THE PAST MONTH, HAVE YOU WISHED YOU WERE DEAD OR WISHED YOU COULD GO TO SLEEP AND NOT WAKE UP?: YES
6. HAVE YOU EVER DONE ANYTHING, STARTED TO DO ANYTHING, OR PREPARED TO DO ANYTHING TO END YOUR LIFE?: NO

## 2022-06-03 NOTE — PROGRESS NOTES
6/3/2022    Darian Salazar    Chief Complaint   Patient presents with    1 Month Follow-Up    Depression     patients mother passed away recently, he would like to discuss medications for this, also states his uncles removed all the guns from his house for his own safety, patient states that sometimes he does think about taking all of his pills and overdosing. HPI  History was obtained from pt. Nanda Forde is a 39 y.o. male with a PMHx as listed below who presents today for anxiety and depression. Pt lost his mom one month ago whom he was living with. He was helping her out of the car at dialysis and she slumped over from an MI and  in his arms. Pt is having trouble focusing, his thoughts in bad directions and his mind wanders, he is staying up to 2-3 am and getting up at 7 am daily. Pt wants something to help restore his peace of mind. Pt was worried that his depression was going to get to the point where he would have ended his life, so he called his uncles and they bought the guns that were in the house and took them away. He wants help to get better, he knows his parents would want him to carry on. He has a brother in [facility] who may visit but has said he can call whenever. There was only a cremation, no  or viewing. Pt has plans to talk to the  about having his parent's urns buried together. Pt says he will not act on his suicidal thoughts, he just knows he needs help and wants to get better. 1. Anxiety    2. Grief    3. Passive suicidal ideations    4.  Current severe episode of major depressive disorder without psychotic features without prior episode [facility])         REVIEW OF SYMPTOMS    Review of Systems    PAST MEDICAL HISTORY  Past Medical History:   Diagnosis Date    Abscess of left foot 2022    Anemia associated with acute blood loss 2022    Callus of foot     Right foot - see's Dr. Norris Kennedy    Cellulitis of left foot 2022    Diabetic ulcer of left midfoot associated with type 2 diabetes mellitus, with muscle involvement without evidence of necrosis ([address] 4/26/2022    Diabetic ulcer of left midfoot associated with type 2 diabetes mellitus, with necrosis of muscle ([address] 6/7/2021    Diabetic ulcer of right midfoot associated with type 2 diabetes mellitus, with fat layer exposed ([address] 8/11/2020    Dizziness     positional    Essential hypertension     Follows with PCP    Hyperlipidemia     Lumbar radiculopathy     Septic embolism ([address] 6/13/2020    Subacute osteomyelitis of left foot ([address] 4/22/2022       FAMILY HISTORY  Family History   Problem Relation Age of Onset    Heart Disease Father     Diabetes Father     Diabetes Mother     Heart Disease Mother     Other Mother     Kidney Disease Mother     Heart Attack Mother        SOCIAL HISTORY  Social History     Socioeconomic History    Marital status: Single     Spouse name: Not on file    Number of children: Not on file    Years of education: Not on file    Highest education level: Not on file   Occupational History    Not on file   Tobacco Use    Smoking status: Never Smoker    Smokeless tobacco: Never Used   Vaping Use    Vaping Use: Never used   Substance and Sexual Activity    Alcohol use: Yes     Comment: \"couple beers a night\"    Drug use: No    Sexual activity: Yes     Partners: Female   Other Topics Concern    Not on file   Social History Narrative    Not on file     Social Determinants of Health     Financial Resource Strain: Low Risk     Difficulty of Paying Living Expenses: Not hard at all   Food Insecurity: No Food Insecurity    Worried About Running Out of Food in the Last Year: Never true    Margarita of Food in the Last Year: Never true   Transportation Needs:     Lack of Transportation (Medical): Not on file    Lack of Transportation (Non-Medical):  Not on file   Physical Activity:     Days of Exercise per Week: Not on file    Minutes of Exercise per Session: Not on file   Stress:     Feeling of Stress : Not on file   Social Connections:     Frequency of Communication with Friends and Family: Not on file    Frequency of Social Gatherings with Friends and Family: Not on file    Attends [employer] Services: Not on file    Active Member of Clubs or Organizations: Not on file    Attends Club or Organization Meetings: Not on file    Marital Status: Not on file   Intimate Partner Violence:     Fear of Current or Ex-Partner: Not on file    Emotionally Abused: Not on file    Physically Abused: Not on file    Sexually Abused: Not on file   Housing Stability:     Unable to Pay for Housing in the Last Year: Not on file    Number of [place] in the Last Year: Not on file    Unstable Housing in the Last Year: Not on file        SURGICAL HISTORY  Past Surgical History:   Procedure Laterality Date    ACHILLES TENDON SURGERY Right 1/8/2021    RIGHT ACHILLES [address] [facility] [place] performed by Rex Dumont DPM at [address] [facility]  03/2018    [place]    DENTAL SURGERY      teeth extractions -half per patient   [address] [facility] Bilateral 5/27/2020    [address] performed by Rosemarie Mcdaniels MD at [address] [place] [address]  2018    [place] OFFICE/OUTPT [facility] Left 4/17/2018    L5-S1 HEMILAMINECTOMY, REMOVAL OF [address] LEFT SIDE performed by Saul Ghosh MD at [place] [address] Right 1/8/2021    RIGHT TRANSMETATARSAL TOE AMPUTATION performed by Rex Dumont DPM at [place] [address] Left 4/23/2022    LEFT RAY GREAT TOE AMPUTATION performed by Rosario Caceres MD at [address] [place] [address]  Current Outpatient Medications   Medication Sig Dispense Refill    collagenase (SANTYL) 250 UNIT/GM ointment Per instructions DAILY (route: topical)      citalopram (CELEXA) 20 mg tablet Take 1 tablet by mouth daily 30 tablet 3    LORazepam (ATIVAN) 0.5 MG tablet Take 1 tablet by mouth nightly as needed (insomnia) for up to 30 days. 30 tablet 0    glyBURIDE (DIABETA) 5 MG tablet Take 10 mg by mouth in the morning and at bedtime      FLUoxetine (PROZAC) 20 MG capsule Take 20 mg by mouth 2 times daily      Alcohol Swabs PADS       insulin glargine (LANTUS SOLOSTAR) 100 UNIT/ML injection pen Inject 24 Units into the skin nightly 3 pen 2    metFORMIN (GLUCOPHAGE) 500 MG tablet Take 2 tablets by mouth 2 times daily (with meals) Indications: Start tomorrow 03/14 360 tablet 1    pioglitazone (ACTOS) 30 MG tablet Take 1 tablet by mouth daily 90 tablet 1    atorvastatin (LIPITOR) 40 MG tablet Take 1 tablet by mouth nightly 90 tablet 1    losartan (COZAAR) 25 MG tablet Take 1 tablet by mouth daily 30 tablet 5    metoprolol succinate (TOPROL XL) 25 MG extended release tablet Take 1 tablet by mouth daily 90 tablet 1    blood glucose monitor strips Test 2 times a day & as needed for symptoms of irregular blood glucose. Dispense sufficient amount for indicated testing frequency plus additional to accommodate PRN testing needs. 100 strip 0    omeprazole (PRILOSEC) 20 MG delayed release capsule Take 1 capsule by mouth once daily in the morning 90 capsule 1    ceFAZolin (ANCEF) 1 g injection Infuse 2,000 mg intravenously every 8 hours 180 each 1    blood glucose monitor kit and supplies Dispense sufficient amount for indicated testing frequency plus additional to accommodate PRN testing needs. Dispense all needed supplies to include: monitor, strips, lancing device, lancets, control solutions, alcohol swabs. 1 kit 0    FreeStyle Lancets MISC 1 each by Does not apply route daily 100 each 3    acetaminophen (TYLENOL) 325 MG tablet Take 2 tablets by mouth every 4 hours as needed for Pain or Fever 120 tablet 3     No current facility-administered medications for this visit.        ALLERGIES  No Known Allergies    PHYSICAL EXAM    /70 (Site: Right Upper Arm, Position: Sitting, Cuff Size: Medium Adult)   Pulse 86   Ht 6' (1.829 m)   Wt 184 lb (83.5 kg)   SpO2 95%   BMI 24.95 kg/m²     Physical Exam  Constitutional:       Appearance: Normal appearance. He is normal weight. HENT:      Head: Normocephalic and atraumatic. Right Ear: Tympanic membrane and external ear normal.      Left Ear: Tympanic membrane and external ear normal.      Nose: No rhinorrhea. Mouth/Throat:      Mouth: Mucous membranes are moist.      Pharynx: Oropharynx is clear. No oropharyngeal exudate or posterior oropharyngeal erythema. Eyes:      General: No scleral icterus. Extraocular Movements: Extraocular movements intact. Conjunctiva/sclera: Conjunctivae normal.      Pupils: Pupils are equal, round, and reactive to light. Cardiovascular:      Rate and Rhythm: Normal rate and regular rhythm. Pulses: Normal pulses. Heart sounds: Normal heart sounds. No murmur heard. No friction rub. No gallop. Pulmonary:      Effort: Pulmonary effort is normal.      Breath sounds: Normal breath sounds. No wheezing, rhonchi or rales. Abdominal:      General: Bowel sounds are normal. There is no distension. Palpations: Abdomen is soft. There is no mass. Tenderness: There is no abdominal tenderness. There is no right CVA tenderness, left CVA tenderness, guarding or rebound. Musculoskeletal:         General: No deformity. Normal range of motion. Cervical back: Normal range of motion and neck supple. No rigidity. No muscular tenderness. Right lower leg: No edema. Left lower leg: No edema. Skin:     General: Skin is warm and dry. Capillary Refill: Capillary refill takes less than 2 seconds. Findings: No bruising, erythema or rash. Neurological:      General: No focal deficit present. Mental Status: He is alert and oriented to person, place, and time.       Coordination: Coordination normal. Gait: Gait normal.   Psychiatric:         Behavior: Behavior normal.         Thought Content: Thought content normal.         Judgment: Judgment normal.         ASSESSMENT & PLAN    1. Anxiety  Severe patient would like to start on medication  - Ambulatory referral to Psychology  - citalopram (CELEXA) 20 mg tablet; Take 1 tablet by mouth daily  Dispense: 30 tablet; Refill: 3  - LORazepam (ATIVAN) 0.5 MG tablet; Take 1 tablet by mouth nightly as needed (insomnia) for up to 30 days. Dispense: 30 tablet; Refill: 0    2. Grief  From losing his mother  - Ambulatory referral to Psychology  - citalopram (CELEXA) 20 mg tablet; Take 1 tablet by mouth daily  Dispense: 30 tablet; Refill: 3  - LORazepam (ATIVAN) 0.5 MG tablet; Take 1 tablet by mouth nightly as needed (insomnia) for up to 30 days. Dispense: 30 tablet; Refill: 0    3. Passive suicidal ideations  Patient is exhibiting healthy thought process, and fear that he would try and hurt himself so he called his uncles to remove guns from the house. He says that he would not hurt himself because parents would be disappointed and because he wants to get better and continue with life. He is making plans of future visits to this office and explicitly stated that he was Not going to hurt himself. He understands that this is a temporary situation made worse by the death of his mother. We did discuss the importance of going to the emergency department if he had fear of hurting himself and he said that he would. I explained the laws regarding pink slipping and he said that he would definitely go and stay for mental health evaluation.    - Ambulatory referral to Psychology  - citalopram (CELEXA) 20 mg tablet; Take 1 tablet by mouth daily  Dispense: 30 tablet; Refill: 3  - LORazepam (ATIVAN) 0.5 MG tablet; Take 1 tablet by mouth nightly as needed (insomnia) for up to 30 days. Dispense: 30 tablet; Refill: 0    4.  Current severe episode of major depressive disorder without psychotic features without prior episode [address] [address]  Worsening  - Ambulatory referral to Psychology  - citalopram (CELEXA) 20 mg tablet; Take 1 tablet by mouth daily  Dispense: 30 tablet; Refill: 3      Return in about 2 weeks (around 6/17/2022). Total time 43 minutes  Electronically signed by PERCY Lawrence on 6/3/2022      Comment: Please note this report has been produced using speech recognition software and may contain errors related to that system including errors in grammar, punctuation, and spelling, as well as words and phrases that may be inappropriate. If there are any questions or concerns please feel free to contact the dictating provider for clarification.

## 2022-06-08 ENCOUNTER — HOSPITAL ENCOUNTER (OUTPATIENT)
Dept: WOUND CARE | Age: 42
Discharge: HOME | End: 2022-06-08
Payer: COMMERCIAL

## 2022-06-08 VITALS
HEART RATE: 86 BPM | RESPIRATION RATE: 16 BRPM | SYSTOLIC BLOOD PRESSURE: 137 MMHG | TEMPERATURE: 99.1 F | DIASTOLIC BLOOD PRESSURE: 72 MMHG

## 2022-06-08 PROCEDURE — 97605 NEG PRS WND THER DME<=50SQCM: CPT

## 2022-06-08 PROCEDURE — 11045 DBRDMT SUBQ TISS EACH ADDL: CPT

## 2022-06-08 PROCEDURE — 11042 DBRDMT SUBQ TIS 1ST 20SQCM/<: CPT

## 2022-06-08 NOTE — PROGRESS NOTES
BIALTERAL HERNIA INGUINAL REPAIR performed by Damaris Eid MD at [address] [place] [address]  2018    [place] OFFICE/OUTPT VISIT,PROCEDURE ONLY Left 4/17/2018    L5-S1 HEMILAMINECTOMY, REMOVAL OF DISC LEFT SIDE performed by Marques Jackson MD at [address] Right 1/8/2021    RIGHT TRANSMETATARSAL TOE AMPUTATION performed by Saulo Bloom DPM at [facility] 73 TOE AMPUTATION Left 4/23/2022    LEFT RAY GREAT TOE AMPUTATION performed by Tiffany Su MD at [address] [facility] EXTRACTION         FAMILY HISTORY    Family History   Problem Relation Age of Onset    Heart Disease Father     Diabetes Father     Diabetes Mother     Heart Disease Mother     Other Mother     Kidney Disease Mother     Heart Attack Mother        SOCIAL HISTORY    Social History     Tobacco Use    Smoking status: Never Smoker    Smokeless tobacco: Never Used   Vaping Use    Vaping Use: Never used   Substance Use Topics    Alcohol use: Yes     Comment: \"couple beers a night\"    Drug use: No       ALLERGIES    No Known Allergies    MEDICATIONS    Current Outpatient Medications on File Prior to Encounter   Medication Sig Dispense Refill    collagenase (SANTYL) 250 UNIT/GM ointment Per instructions DAILY (route: topical)      citalopram (CELEXA) 20 mg tablet Take 1 tablet by mouth daily 30 tablet 3    LORazepam (ATIVAN) 0.5 MG tablet Take 1 tablet by mouth nightly as needed (insomnia) for up to 30 days.  30 tablet 0    glyBURIDE (DIABETA) 5 MG tablet Take 10 mg by mouth in the morning and at bedtime      FLUoxetine (PROZAC) 20 MG capsule Take 20 mg by mouth 2 times daily      Alcohol Swabs PADS       insulin glargine (LANTUS SOLOSTAR) 100 UNIT/ML injection pen Inject 24 Units into the skin nightly 3 pen 2    metFORMIN (GLUCOPHAGE) 500 MG tablet Take 2 tablets by mouth 2 times daily (with meals) Indications: Start tomorrow 03/14 360 tablet 1    pioglitazone (ACTOS) 30 MG tablet Take 1 tablet by mouth daily 90 tablet 1    atorvastatin (LIPITOR) 40 MG tablet Take 1 tablet by mouth nightly 90 tablet 1    losartan (COZAAR) 25 MG tablet Take 1 tablet by mouth daily 30 tablet 5    metoprolol succinate (TOPROL XL) 25 MG extended release tablet Take 1 tablet by mouth daily 90 tablet 1    blood glucose monitor strips Test 2 times a day & as needed for symptoms of irregular blood glucose. Dispense sufficient amount for indicated testing frequency plus additional to accommodate PRN testing needs. 100 strip 0    omeprazole (PRILOSEC) 20 MG delayed release capsule Take 1 capsule by mouth once daily in the morning 90 capsule 1    ceFAZolin (ANCEF) 1 g injection Infuse 2,000 mg intravenously every 8 hours 180 each 1    blood glucose monitor kit and supplies Dispense sufficient amount for indicated testing frequency plus additional to accommodate PRN testing needs. Dispense all needed supplies to include: monitor, strips, lancing device, lancets, control solutions, alcohol swabs. 1 kit 0    FreeStyle Lancets MISC 1 each by Does not apply route daily 100 each 3    acetaminophen (TYLENOL) 325 MG tablet Take 2 tablets by mouth every 4 hours as needed for Pain or Fever 120 tablet 3     No current facility-administered medications on file prior to encounter. REVIEW OF SYSTEMS    Pertinent items are noted in HPI. Constitutional: Negative for systemic symptoms including fever, chills and malaise. Objective:      /72   Pulse 86   Temp 99.1 °F (37.3 °C) (Temporal)   Resp 16     PHYSICAL EXAM      General: The patient is in no acute distress. Mental status:  Patient is appropriate, is  oriented to place and plan of care.   Dermatologic exam: Visual inspection of the periwound reveals the skin to be normal in turgor and texture  Wound exam: see wound description below in procedure note      Assessment:     Problem List Items Addressed This Visit     None        Procedure Note    Indications:  Based on my examination of this patient's wound(s) today, sharp excision into necrotic subcutaneous tissue is required to promote healing and evaluate the extent of previous healing. Performed by: Vianey Scales MD    Consent obtained: Yes    Time out taken:  Yes    Pain Control:       Debridement:Excisional Debridement    Using scissors, forceps and # 10 blade scalpel the wound(s) was/were sharply debrided down through and including the removal of subcutaneous tissue. Devitalized Tissue Debrided:  slough, exudate and callus    Pre Debridement Measurements:  Are located in the Wound Documentation Flow Sheet    All active wounds listed below with today's date are evaluated  Wound(s)    debrided this date include # : 1 and 2     Post  Debridement Measurements:  Negative Pressure Wound Therapy (Active)   Unit Type KCI 06/08/22 1015   Dressing Type Black Foam 06/08/22 1015   Number of pieces used 1 06/08/22 1015   Number of pieces removed 1 06/08/22 1015   Cycle Continuous 06/08/22 1015   Target Pressure (mmHg) 125 06/08/22 1015   Canister changed? Yes 05/25/22 1126   Dressing Changed Changed/New 05/25/22 1126   Dressing Change Due 05/27/22 05/25/22 1126   Number of days: 13       Incision 04/17/18 Back (Active)   Number of days: 1513       Wound 06/13/20 Tibial Right pt states reddened and rash like after a sunburn 6/11/2020 (Active)   Number of days: 724       Wound 05/11/22 #1 (onset 2 weeks) Left Medial Foot Amp Site (Active)   Wound Image   06/08/22 1015   Wound Etiology Surgical 05/25/22 1015   Dressing Status New dressing applied;Clean;Dry; Intact 05/21/22 1600   Wound Cleansed Wound cleanser 06/08/22 1015   Dressing/Treatment Alginate 05/18/22 1123   Offloading for Diabetic Foot Ulcers Walker 05/11/22 1027   Wound Length (cm) 8.9 cm 06/08/22 1015   Wound Width (cm) 3.5 cm 06/08/22 1015   Wound Depth (cm) 0.4 cm 06/08/22 1015   Wound Surface Area (cm^2) 31.15 cm^2 06/08/22 1015   Change in Wound Size % (l*w) 39.46 06/08/22 1015   Wound Volume (cm^3) 12.46 cm^3 06/08/22 1015   Wound Healing % 52 06/08/22 1015   Post-Procedure Length (cm) 8.9 cm 06/08/22 1047   Post-Procedure Width (cm) 3.5 cm 06/08/22 1047   Post-Procedure Depth (cm) 0.4 cm 06/08/22 1047   Post-Procedure Surface Area (cm^2) 31.15 cm^2 06/08/22 1047   Post-Procedure Volume (cm^3) 12.46 cm^3 06/08/22 1047   Distance Tunneling (cm) 0 cm 06/08/22 1015   Tunneling Position ___ O'Clock 0 06/08/22 1015   Undermining Starts ___ O'Clock 0 06/08/22 1015   Undermining Ends___ O'Clock 0 06/08/22 1015   Undermining Maxium Distance (cm) 0 06/08/22 1015   Wound Assessment [place]/red;Slough 06/08/22 1015   Drainage Amount Moderate 06/08/22 1015   Drainage Description Serosanguinous 06/08/22 1015   Odor None 06/08/22 1015   Rosalie-wound Assessment Intact 06/08/22 1015   Margins Defined edges 06/08/22 1015   Wound Thickness Description not for Pressure Injury Full thickness 06/08/22 1015   Number of days: 28       Wound 05/11/22 #2 (onset unknown) Left Plantar (Active)   Wound Image   06/08/22 1015   Dressing Status New dressing applied;Clean;Dry; Intact 05/21/22 1600   Wound Cleansed Wound cleanser 06/08/22 1015   Dressing/Treatment Alginate 05/18/22 1123   Offloading for Diabetic Foot Ulcers Walker 05/18/22 1027   Wound Length (cm) 1.4 cm 06/08/22 1015   Wound Width (cm) 1.6 cm 06/08/22 1015   Wound Depth (cm) 0.2 cm 06/08/22 1015   Wound Surface Area (cm^2) 2.24 cm^2 06/08/22 1015   Change in Wound Size % (l*w) -40 06/08/22 1015   Wound Volume (cm^3) 0.448 cm^3 06/08/22 1015   Wound Healing % -40 06/08/22 1015   Post-Procedure Length (cm) 1.4 cm 06/08/22 1047   Post-Procedure Width (cm) 1.6 cm 06/08/22 1047   Post-Procedure Depth (cm) 0.2 cm 06/08/22 1047   Post-Procedure Surface Area (cm^2) 2.24 cm^2 06/08/22 1047   Post-Procedure Volume (cm^3) 0.448 cm^3 06/08/22 1047   Distance Tunneling (cm) 0 cm 06/08/22 1015   Tunneling Position ___ O'Clock 0 06/08/22 1015 Undermining Starts ___ O'Clock 0 06/08/22 1015   Undermining Ends___ O'Clock 0 06/08/22 1015   Undermining Maxium Distance (cm) 0 06/08/22 1015   Wound Assessment [place]/red;Slough 06/08/22 1015   Drainage Amount Moderate 06/08/22 1015   Drainage Description Yellow 06/08/22 1015   Odor None 06/08/22 1015   Rosalie-wound Assessment Intact 06/08/22 1015   Margins Defined edges 06/08/22 1015   Wound Thickness Description not for Pressure Injury Full thickness 06/08/22 1015   Number of days: 28       Wound 05/11/22 #3 (onset unknown) Right Plantar (Active)   Wound Image   06/08/22 1015   Dressing Status New dressing applied;Clean;Dry; Intact 05/21/22 1600   Wound Cleansed Wound cleanser 06/08/22 1015   Offloading for Diabetic Foot Ulcers Walker 05/18/22 1027   Wound Length (cm) 0.5 cm 06/08/22 1015   Wound Width (cm) 0.2 cm 06/08/22 1015   Wound Depth (cm) 0.2 cm 06/08/22 1015   Wound Surface Area (cm^2) 0.1 cm^2 06/08/22 1015   Change in Wound Size % (l*w) 92.59 06/08/22 1015   Wound Volume (cm^3) 0.02 cm^3 06/08/22 1015   Wound Healing % 96 06/08/22 1015   Post-Procedure Length (cm) 1 cm 05/25/22 1055   Post-Procedure Width (cm) 0.2 cm 05/25/22 1055   Post-Procedure Depth (cm) 0.3 cm 05/25/22 1055   Post-Procedure Surface Area (cm^2) 0.2 cm^2 05/25/22 1055   Post-Procedure Volume (cm^3) 0.06 cm^3 05/25/22 1055   Distance Tunneling (cm) 0 cm 06/08/22 1015   Tunneling Position ___ O'Clock 0 06/08/22 1015   Undermining Starts ___ O'Clock 0 06/08/22 1015   Undermining Ends___ O'Clock 0 06/08/22 1015   Undermining Maxium Distance (cm) 0 06/08/22 1015   Wound Assessment Pink/red 06/08/22 1015   Drainage Amount Moderate 06/08/22 1015   Drainage Description Serosanguinous 06/08/22 1015   Odor None 06/08/22 1015   Rosalie-wound Assessment Intact 06/08/22 1015   Margins Defined edges 06/08/22 1015   Wound Thickness Description not for Pressure Injury Full thickness 06/08/22 1015   Number of days: 28       Percent of Wound(s) Debrided: approximately 100%    Total  Area  Debrided:  33 sq cm     Bleeding:  Minimal    Hemostasis Achieved:  not needed    Procedural Pain:  0  / 10     Post Procedural Pain:  0 / 10     Response to treatment:  Well tolerated by patient. Status of wound progress and description from last visit:   Improving. Plan:       Discharge Instructions       PHYSICIAN ORDERS AND DISCHARGE INSTRUCTIONS     NOTE: Upon discharge from the [address], you will receive a patient experience survey. We would be grateful if you would take the time to fill this survey out.     Wound care order history:                 YADY's   Right       Left               Date:     Continuing wound care orders and information:                 Residence:  Private              Continue home health care with: PICC LINE ONLY                Your wound-care supplies will be provided by:      Wound cleansing:               ZJ not scrub or use excessive force.              Wash hands with soap and water before and after dressing changes.               CSHTZ to applying a clean dressing, cleanse wound with normal saline, wound cleanser, or mild soap and water.               Ask the physician or nurse before getting the wound(s) wet in a shower     Daily Wound management:              Keep weight off wounds and reposition every 2 hours.              Avoid standing for long periods of time.              Apply wraps/stockings in AM and remove at bedtime.              If swelling is present, elevate legs to the level of the heart or above for 30 minutes 4-5 times a day and/or when sitting.                                      When taking antibiotics take entire prescription as ordered by physician do not stop taking until medicine is all gone.                              Wound Care Notes:   Rx: [address] [facility]  Right and Left foot wounds cultured 11/4/21  Apply for wound vac 05/11/22 will arrive 05/18/22  Apply for grafts 05/11/22: aLL GRAFTS approved 06/08/22  Santyl ordered 05/11/22                                 Orders for this week:  06/08/22                 FAX ORDERS TO [facility]!      Right AND LEFT PLANTAR Plantar Foot  Wash with soap and water, pat dry. APPLY ANSASEPT AND STIMULEN TO WOUND BED   COVER WITH SORBEX AND CONFORM      Left Foot Toe amp site  -   Wash with soap and water, pat dry. APPLY SANTYL TO WOUND BED    WOUND VAC THERAPY: left foot toe amputation site: . As stated below  DUODERM TO PERIWOUND FOR PROTECTION. APPLY BLACK FOAM TO WOUND.  MAY USE MEPITEL TO WOUND BED TO PREVENT BLACK FOAM FROM ADHERING TO WOUND BED. SECURE VAC DRESSING WITH DRAPE. SET WOUND VAC  CONTINUOUS SUCTION. CANISTER CHANGE WEEKLY OR ACCORDING TO VOLUME OF DRAINAGE.     WOUND VAC DRESSING TO BE CHANGED MON & FRI BY HOME CARE  WOUND CLINIC WILL CHANGE ON WEDNESDAYS.     Follow up in 1 week in the wound care center. Primary Wound Care Provider: Dr Madeline Corral   Call  for any questions or concerns.   Date__________   Time____________        Treatment Note      Written Patient Dismissal Instructions Given            Electronically signed by Amanda Castle MD on 6/8/2022 at 10:59 AM

## 2022-06-08 NOTE — PROGRESS NOTES
Negative Pressure    NAME:  Morgan Alas OF BIRTH:  1980  MEDICAL RECORD NUMBER:  [id number]  DATE:  6/8/2022     Applied Negative Pressure to left amp site.  [x] Applied skin barrier prep to serenity-wound.  [x] Cut strips of plastic drape to picture frame wound so that serenity-wound is     covered with the drape.  [x] If bridging dressing to less prominent site, cover any intact skin that will come in contact with the Negative Pressure Therapy sponge, gauze or channel drain with plastic drape. The sponge should never touch intact skin.  [x] Cut sponge, gauze or channel drain to size which will fit into the wound/ulcer bed without being forced.  [x] Be sure the sponge is large enough to hold the entire round plastic flange which is attached to the tubing. Never allow flange to be larger than the sponge or it will produce suction damaging intact skin.  Total number of individual pieces of foam used within the wound bed: 1     [x] If bridging the dressing away from the primary site, be sure the bridge leads to a piece of sponge large enough to hold the entire flange without allowing any of the flange to overlap onto intact skin.  [x] Covered sponge, gauze or channel drain with plastic drape.  [x] Cut a hole in this plastic drape directly over the sponge the same size as the plastic drain tubing.  [x] Removed plastic liner from flange and apply it directly over the hole you cut.  [x] Removed the plastic cover from the flange.  [x] Attached the tubing to the wound/ulcer Negative Pressure Therapy and turn it on to be sure a vacuum is created and that there are no leaks.  [x] If air leaks occur, use plastic drape to patch them.  [x] Secured Negative Pressure Therapy dressing with ace wrap loosely if located on an extremity. Maintain tubing outside of ace wrap. Tubing must not exert pressure on intact skin.     Applied per  Guidelines      Nonselective enzymatic debridement performed with Ravinderyl per physician order to wound(s) of the left foot amp site. Patient tolerated the procedure well.      Electronically signed by Casi Cantrell RN on 6/8/2022 at 11:06 AM

## 2022-06-15 ENCOUNTER — HOSPITAL ENCOUNTER (OUTPATIENT)
Dept: WOUND CARE | Age: 42
Discharge: HOME | End: 2022-06-14

## 2022-06-15 ENCOUNTER — HOSPITAL ENCOUNTER (OUTPATIENT)
Dept: WOUND CARE | Age: 42
Discharge: HOME | End: 2022-06-15
Payer: COMMERCIAL

## 2022-06-15 VITALS
HEART RATE: 82 BPM | TEMPERATURE: 97.9 F | DIASTOLIC BLOOD PRESSURE: 80 MMHG | RESPIRATION RATE: 18 BRPM | SYSTOLIC BLOOD PRESSURE: 130 MMHG

## 2022-06-15 DIAGNOSIS — L97.422 DIABETIC ULCER OF LEFT MIDFOOT ASSOCIATED WITH TYPE 2 DIABETES MELLITUS, WITH FAT LAYER EXPOSED (HCC): ICD-10-CM

## 2022-06-15 DIAGNOSIS — E11.621 DIABETIC ULCER OF RIGHT MIDFOOT ASSOCIATED WITH TYPE 2 DIABETES MELLITUS, WITH FAT LAYER EXPOSED (HCC): ICD-10-CM

## 2022-06-15 DIAGNOSIS — E11.621 DIABETIC ULCER OF LEFT MIDFOOT ASSOCIATED WITH TYPE 2 DIABETES MELLITUS, WITH FAT LAYER EXPOSED (HCC): ICD-10-CM

## 2022-06-15 DIAGNOSIS — L97.412 DIABETIC ULCER OF RIGHT MIDFOOT ASSOCIATED WITH TYPE 2 DIABETES MELLITUS, WITH FAT LAYER EXPOSED (HCC): ICD-10-CM

## 2022-06-15 DIAGNOSIS — Z89.432 PARTIAL NONTRAUMATIC AMPUTATION OF FOOT, LEFT (HCC): ICD-10-CM

## 2022-06-15 PROBLEM — L03.119 CELLULITIS OF FOOT: Status: RESOLVED | Noted: 2020-12-22 | Resolved: 2022-06-15

## 2022-06-15 PROBLEM — S98.132A AMPUTATION OF TOE OF LEFT FOOT (HCC): Status: RESOLVED | Noted: 2021-06-22 | Resolved: 2022-06-15

## 2022-06-15 PROBLEM — L97.523 DIABETIC ULCER OF TOE OF LEFT FOOT ASSOCIATED WITH TYPE 2 DIABETES MELLITUS, WITH NECROSIS OF MUSCLE (HCC): Status: RESOLVED | Noted: 2021-11-05 | Resolved: 2022-06-15

## 2022-06-15 PROBLEM — L97.425 DIABETIC ULCER OF LEFT MIDFOOT ASSOCIATED WITH TYPE 2 DIABETES MELLITUS, WITH MUSCLE INVOLVEMENT WITHOUT EVIDENCE OF NECROSIS (HCC): Status: RESOLVED | Noted: 2022-04-26 | Resolved: 2022-06-15

## 2022-06-15 PROCEDURE — 97605 NEG PRS WND THER DME<=50SQCM: CPT

## 2022-06-15 PROCEDURE — 11042 DBRDMT SUBQ TIS 1ST 20SQCM/<: CPT

## 2022-06-15 PROCEDURE — 15275 SKIN SUB GRAFT FACE/NK/HF/G: CPT | Performed by: NURSE PRACTITIONER

## 2022-06-15 PROCEDURE — 11042 DBRDMT SUBQ TIS 1ST 20SQCM/<: CPT | Performed by: NURSE PRACTITIONER

## 2022-06-15 PROCEDURE — 15275 SKIN SUB GRAFT FACE/NK/HF/G: CPT

## 2022-06-15 RX ORDER — BETAMETHASONE DIPROPIONATE 0.05 %
OINTMENT (GRAM) TOPICAL ONCE
Status: CANCELLED | OUTPATIENT
Start: 2022-06-15 | End: 2022-06-15

## 2022-06-15 RX ORDER — LIDOCAINE 50 MG/G
OINTMENT TOPICAL ONCE
Status: CANCELLED | OUTPATIENT
Start: 2022-06-15 | End: 2022-06-15

## 2022-06-15 RX ORDER — BACITRACIN, NEOMYCIN, POLYMYXIN B 400; 3.5; 5 [USP'U]/G; MG/G; [USP'U]/G
OINTMENT TOPICAL ONCE
Status: CANCELLED | OUTPATIENT
Start: 2022-06-15 | End: 2022-06-15

## 2022-06-15 RX ORDER — LIDOCAINE 40 MG/G
CREAM TOPICAL ONCE
Status: CANCELLED | OUTPATIENT
Start: 2022-06-15 | End: 2022-06-15

## 2022-06-15 RX ORDER — GENTAMICIN SULFATE 1 MG/G
OINTMENT TOPICAL ONCE
Status: CANCELLED | OUTPATIENT
Start: 2022-06-15 | End: 2022-06-15

## 2022-06-15 RX ORDER — BACITRACIN ZINC AND POLYMYXIN B SULFATE 500; 1000 [USP'U]/G; [USP'U]/G
OINTMENT TOPICAL ONCE
Status: CANCELLED | OUTPATIENT
Start: 2022-06-15 | End: 2022-06-15

## 2022-06-15 RX ORDER — CLOBETASOL PROPIONATE 0.5 MG/G
OINTMENT TOPICAL ONCE
Status: CANCELLED | OUTPATIENT
Start: 2022-06-15 | End: 2022-06-15

## 2022-06-15 RX ORDER — GINSENG 100 MG
CAPSULE ORAL ONCE
Status: CANCELLED | OUTPATIENT
Start: 2022-06-15 | End: 2022-06-15

## 2022-06-15 RX ORDER — LIDOCAINE HYDROCHLORIDE 40 MG/ML
SOLUTION TOPICAL ONCE
Status: CANCELLED | OUTPATIENT
Start: 2022-06-15 | End: 2022-06-15

## 2022-06-15 ASSESSMENT — PAIN SCALES - GENERAL: PAINLEVEL_OUTOF10: 0

## 2022-06-15 NOTE — PROGRESS NOTES
Negative Pressure Wound Therapy    NAME:  Jenifer Welsh OF BIRTH:  1980  MEDICAL RECORD NUMBER:  [id number]  DATE:  6/15/2022     Applied Negative Pressure to left foot wound(s)/ulcer(s).  [x] Applied skin barrier prep to serenity-wound.  [x] Cut strips of plastic drape to picture frame wound so that serenity-wound is     covered with the drape.  [x] If bridging dressing to less prominent site, cover any intact skin that will come in contact with the Negative Pressure Therapy sponge, gauze or channel drain with plastic drape. The sponge should never touch intact skin.  [x] Cut sponge, gauze or channel drain to size which will fit into the wound/ulcer bed without being forced.  [x] Be sure the sponge is large enough to hold the entire round plastic flange which is attached to the tubing. Never allow flange to be larger than the sponge or it will produce suction damaging intact skin.  Total number of individual pieces of foam used within the wound bed: 1     [x] If bridging the dressing away from the primary site, be sure the bridge leads to a piece of sponge large enough to hold the entire flange without allowing any of the flange to overlap onto intact skin.  [x] Covered sponge, gauze or channel drain with plastic drape.  [x] Cut a hole in this plastic drape directly over the sponge the same size as the plastic drain tubing.  [x] Removed plastic liner from flange and apply it directly over the hole you cut.  [x] Removed the plastic cover from the flange.  [x] Attached the tubing to the wound/ulcer Negative Pressure Therapy and turn it on to be sure a vacuum is created and that there are no leaks.  [x] If air leaks occur, use plastic drape to patch them.  [x] Secured Negative Pressure Therapy dressing with ace wrap loosely if located on an extremity. Maintain tubing outside of ace wrap. Tubing must not exert pressure on intact skin.     Applied per [facility] Guidelines      Electronically signed by Corky Feldman LPN on 2/32/6760 at 21:27 AM

## 2022-06-15 NOTE — PROGRESS NOTES
[address] [facility] Progress Note With Procedure    Carrillo Westbrook  AGE: 39 y.o. GENDER: male  : 1980  EPISODE DATE:  6/15/2022     Subjective:     Chief Complaint   Patient presents with    Wound Check         HISTORY of PRESENT ILLNESS      Carrillo Westbrook is a 39 y.o. male who presents today for wound evaluation of Chronic diabetic, pressure and non-healing surgical ulcer(s) of the left medial foot and lateral plantar surface. The ulcer is of marked severity. The underlying cause of the wound is diabetes, non-healing surgical.      Wound Pain Timing/Severity: none  Quality of pain: N/A  Severity of pain:  0 / 10   Modifying Factors: diabetes and chronic pressure  Associated Signs/Symptoms: drainage     Diabetes: Yes, on an oral and insulin regimen, last A1c 10.4 as of 22  Smoking: Never smoker  Obesity:No  Anticoagulant therapy: No  Immunosuppression: No    Patient educated on the 6 essential components necessary for wound healing: Circulation, Debridements, Proper Dressings and Topical Wound Products, Infection Control, Edema Control and Offloading. Patient educated on those factors that negatively effect or impact wound healing: smoking, obesity, uncontrolled diabetes, anticoagulant and immunosuppressive regimens, inadequate nutrition, untreated arterial and venous disease if applicable and measures to manage edema.        PAST MEDICAL HISTORY        Diagnosis Date    Abscess of left foot 2022    Anemia associated with acute blood loss 2022    Callus of foot     Right foot - see's Dr. Eduardo Ashley Cellulitis of left foot 2022    Diabetic ulcer of left midfoot associated with type 2 diabetes mellitus, with muscle involvement without evidence of necrosis ([address] 2022    Diabetic ulcer of left midfoot associated with type 2 diabetes mellitus, with necrosis of muscle ([address] 2021    Diabetic ulcer of right midfoot associated with type 2 diabetes mellitus, with fat layer exposed ([facility] 8/11/2020    Dizziness     positional    Essential hypertension     Follows with PCP    Hyperlipidemia     Lumbar radiculopathy     Septic embolism ([facility] 6/13/2020    Subacute osteomyelitis of left foot ([facility] 4/22/2022       PAST SURGICAL HISTORY    Past Surgical History:   Procedure Laterality Date    ACHILLES TENDON SURGERY Right 1/8/2021    RIGHT ACHILLES TENDON LENGTHENING REPAIR performed by Fatoumata Saunders DPM at [address]  03/2018    [facility]    DENTAL SURGERY      teeth extractions -half per patient    HERNIA REPAIR Bilateral 5/27/2020    BIALTERAL HERNIA INGUINAL REPAIR performed by Bren Garcia MD at [address] [place] [address]    CA OFFICE/OUTPT VISIT,PROCEDURE ONLY Left 4/17/2018    L5-S1 HEMILAMINECTOMY, REMOVAL OF DISC LEFT SIDE performed by Thierry Forbes MD at [facility] Right 1/8/2021    RIGHT TRANSMETATARSAL TOE AMPUTATION performed by Fatoumata Saunders DPM at [facility] TOE AMPUTATION Left 4/23/2022    LEFT RAY GREAT TOE AMPUTATION performed by Jessica Fields MD at [address] EXTRACTION         FAMILY HISTORY    Family History   Problem Relation Age of Onset    Heart Disease Father     Diabetes Father     Diabetes Mother     Heart Disease Mother     Other Mother     Kidney Disease Mother     Heart Attack Mother        SOCIAL HISTORY    Social History     Tobacco Use    Smoking status: Never Smoker    Smokeless tobacco: Never Used   Vaping Use    Vaping Use: Never used   Substance Use Topics    Alcohol use: Yes     Comment: \"couple beers a night\"    Drug use: No       ALLERGIES    No Known Allergies    MEDICATIONS    Current Outpatient Medications on File Prior to Encounter   Medication Sig Dispense Refill    collagenase (SANTYL) 250 UNIT/GM ointment Per instructions DAILY (route: topical)      citalopram (CELEXA) 20 mg tablet Take 1 tablet by mouth daily 30 tablet 3    LORazepam (ATIVAN) 0.5 MG tablet Take 1 tablet by mouth nightly as needed (insomnia) for up to 30 days. 30 tablet 0    glyBURIDE (DIABETA) 5 MG tablet Take 10 mg by mouth in the morning and at bedtime      FLUoxetine (PROZAC) 20 MG capsule Take 20 mg by mouth 2 times daily      Alcohol Swabs PADS       insulin glargine (LANTUS SOLOSTAR) 100 UNIT/ML injection pen Inject 24 Units into the skin nightly 3 pen 2    metFORMIN (GLUCOPHAGE) 500 MG tablet Take 2 tablets by mouth 2 times daily (with meals) Indications: Start tomorrow 03/14 360 tablet 1    pioglitazone (ACTOS) 30 MG tablet Take 1 tablet by mouth daily 90 tablet 1    atorvastatin (LIPITOR) 40 MG tablet Take 1 tablet by mouth nightly 90 tablet 1    losartan (COZAAR) 25 MG tablet Take 1 tablet by mouth daily 30 tablet 5    metoprolol succinate (TOPROL XL) 25 MG extended release tablet Take 1 tablet by mouth daily 90 tablet 1    blood glucose monitor strips Test 2 times a day & as needed for symptoms of irregular blood glucose. Dispense sufficient amount for indicated testing frequency plus additional to accommodate PRN testing needs. 100 strip 0    omeprazole (PRILOSEC) 20 MG delayed release capsule Take 1 capsule by mouth once daily in the morning 90 capsule 1    ceFAZolin (ANCEF) 1 g injection Infuse 2,000 mg intravenously every 8 hours 180 each 1    blood glucose monitor kit and supplies Dispense sufficient amount for indicated testing frequency plus additional to accommodate PRN testing needs. Dispense all needed supplies to include: monitor, strips, lancing device, lancets, control solutions, alcohol swabs. 1 kit 0    FreeStyle Lancets MISC 1 each by Does not apply route daily 100 each 3    acetaminophen (TYLENOL) 325 MG tablet Take 2 tablets by mouth every 4 hours as needed for Pain or Fever 120 tablet 3     No current facility-administered medications on file prior to encounter.        REVIEW OF SYSTEMS    Pertinent items are noted in HPI.    Constitutional: Negative for systemic symptoms including fever, chills and malaise. Objective: There were no vitals taken for this visit. PHYSICAL EXAM      General: The patient is in no acute distress. Mental status:  Patient is appropriate, is  oriented to place and plan of care. 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 Performed by: RAFAEL Plaza CNP    Consent obtained: Yes    Time out taken:  Yes    Pain Control:       Debridement:Excisional Debridement    Using scissors, forceps and # 10 blade scalpel the wound(s) was/were sharply debrided down through and including the removal of subcutaneous tissue. Devitalized Tissue Debrided:  slough, exudate and callus    Pre Debridement Measurements:  Are located in the Wound Documentation Flow Sheet    All active wounds listed below with today's date are evaluated  Wound(s)    debrided this date include # : 1, 2 & 3    Post  Debridement Measurements:  Negative Pressure Wound Therapy (Active)   Unit Type KCI 06/08/22 1015   Dressing Type Black Foam 06/08/22 1015   Number of pieces used 1 06/15/22 1126   Number of pieces removed 1 06/08/22 1015   Cycle Continuous 06/15/22 1126   Target Pressure (mmHg) 125 06/15/22 1126   Canister changed?  Yes 05/25/22 1126   Dressing Changed Changed/New 05/25/22 1126   Dressing Change Due 05/27/22 05/25/22 1126   Number of days: 23   Wound 05/11/22 #1 (onset 2 weeks) Left Medial Foot Amp Site (Active)   Wound Image   06/08/22 1015   Wound Etiology Surgical 06/15/22 1000   Dressing Status New dressing applied 06/15/22 1126   Wound Cleansed Soap and water 06/15/22 1000   Dressing/Treatment Alginate 05/18/22 1123   Offloading for Diabetic Foot Ulcers Post op shoe 06/15/22 1126   Wound Length (cm) 8.5 cm 06/15/22 1000   Wound Width (cm) 2.9 cm 06/15/22 1000   Wound Depth (cm) 0.4 cm 06/15/22 1000   Wound Surface Area (cm^2) 24.65 cm^2 06/15/22 1000   Change in Wound Size % (l*w) 52.09 06/15/22 1000   Wound Volume (cm^3) 9.86 cm^3 06/15/22 1000   Wound Healing % 62 06/15/22 1000   Post-Procedure Length (cm) 8.5 cm 06/15/22 1028   Post-Procedure Width (cm) 2.9 cm 06/15/22 1028   Post-Procedure Depth (cm) 0.4 cm 06/15/22 1028   Post-Procedure Surface Area (cm^2) 24.65 cm^2 06/15/22 1028   Post-Procedure Volume (cm^3) 9.86 cm^3 06/15/22 1028   Distance Tunneling (cm) 0 cm 06/15/22 1000   Tunneling Position ___ O'Clock 0 06/15/22 1000   Undermining Starts ___ O'Clock 0 06/15/22 1000   Undermining Ends___ O'Clock 0 06/15/22 1000   Undermining Maxium Distance (cm) 0 06/15/22 1000   Wound Assessment Pink/red;Slough 06/15/22 1000   Drainage Amount Moderate 06/15/22 1000   Drainage Description Serosanguinous 06/15/22 1000   Odor None 06/15/22 1000   Rosalie-wound Assessment Intact 06/15/22 1000   Margins Defined edges 06/15/22 1000   Wound Thickness Description not for Pressure Injury Full thickness 06/15/22 1000   Number of days: 37       Wound 05/11/22 #2 (onset unknown) Left Plantar (Active)   Wound Image   06/08/22 1015   Dressing Status New dressing applied 06/15/22 1126   Wound Cleansed Soap and water 06/15/22 1000   Dressing/Treatment Alginate 05/18/22 1123   Offloading for Diabetic Foot Ulcers Post op shoe 06/15/22 1126   Wound Length (cm) 1.4 cm 06/15/22 1000   Wound Width (cm) 1.5 cm 06/15/22 1000   Wound Depth (cm) 0.2 cm 06/15/22 1000   Wound Surface Area (cm^2) 2.1 cm^2 06/15/22 1000   Change in Wound Size % (l*w) -31.25 06/15/22 1000   Wound Volume (cm^3) 0.42 cm^3 06/15/22 1000   Wound Healing % -31 06/15/22 1000   Post-Procedure Length (cm) 1.4 cm 06/15/22 1028   Post-Procedure Width (cm) 1.5 cm 06/15/22 1028   Post-Procedure Depth (cm) 0.2 cm 06/15/22 1028   Post-Procedure Surface Area (cm^2) 2.1 cm^2 06/15/22 1028   Post-Procedure Volume (cm^3) 0.42 cm^3 06/15/22 1028   Distance Tunneling (cm) 0 cm 06/15/22 1000   Tunneling Position ___ O'Clock 0 06/15/22 1000   Undermining Starts ___ O'Clock 0 06/15/22 1000   Undermining Ends___ O'Clock 0 06/15/22 1000   Undermining Maxium Distance (cm) 0 06/15/22 1000   Wound Assessment Pink/red;Slough 06/15/22 1000   Drainage Amount Moderate 06/15/22 1000   Drainage Description Yellow 06/15/22 1000   Odor None 06/15/22 1000   Rosalie-wound Assessment Hyperkeratosis (callous) 06/15/22 1000   Margins Defined edges 06/15/22 1000   Wound Thickness Description not for Pressure Injury Full thickness 06/15/22 1000   Number of days: 37       Wound 05/11/22 #3 (onset unknown) Right Plantar (Active)   Wound Image   06/08/22 1015   Dressing Status New dressing applied 06/15/22 1126   Wound Cleansed Soap and water 06/15/22 1000   Offloading for Diabetic Foot Ulcers Walker 06/15/22 1000   Wound Length (cm) 0.7 cm 06/15/22 1000   Wound Width (cm) 0.1 cm 06/15/22 1000   Wound Depth (cm) 0.1 cm 06/15/22 1000   Wound Surface Area (cm^2) 0.07 cm^2 06/15/22 1000   Change in Wound Size % (l*w) 94.81 06/15/22 1000   Wound Volume (cm^3) 0.007 cm^3 06/15/22 1000   Wound Healing % 99 06/15/22 1000   Post-Procedure Length (cm) 0.7 cm 06/15/22 1028   Post-Procedure Width (cm) 0.1 cm 06/15/22 1028   Post-Procedure Depth (cm) 0.1 cm 06/15/22 1028   Post-Procedure Surface Area (cm^2) 0.07 cm^2 06/15/22 1028   Post-Procedure Volume (cm^3) 0.007 cm^3 06/15/22 1028   Distance Tunneling (cm) 0 cm 06/15/22 1000 Tunneling Position ___ O'Clock 0 06/15/22 1000   Undermining Starts ___ O'Clock 0 06/15/22 1000   Undermining Ends___ O'Clock 0 06/15/22 1000   Undermining Maxium Distance (cm) 0 06/15/22 1000   Wound Assessment Pink/red 06/15/22 1000   Drainage Amount Moderate 06/15/22 1000   Drainage Description Serosanguinous 06/15/22 1000   Odor None 06/15/22 1000   Rosalie-wound Assessment Intact 06/15/22 1000   Margins Defined edges 06/15/22 1000   Wound Thickness Description not for Pressure Injury Full thickness 06/15/22 1000   Number of days: 37     Percent of Wound(s) Debrided: approximately 100%    Total  Area  Debrided: 2.17 sq cm     Bleeding:  Minimal    Hemostasis Achieved:  not needed    Procedural Pain:  0  / 10     Post Procedural Pain:  0 / 10     Response to treatment:  Well tolerated by patient. Product Utilized:         [] APLIGRAF   []44 sq/cm   []88 sq/cm    []132 sq/cm  []176  sq/cm           [] DERMAGRAFT  [] 38sq/cm   []76 sq/cm    []114 sq/cm  []152 sq/cm        [] THERASKIN  [] 13sq/cm   []37.34 sq/cm             [x] PuraPly 4x4 Fenestrated    [] 1.54 sq/cm disc   #of pieces  [] 2 pieces (3.08 sq/cm)    [] 3 pieces (4.62 sq/cm)                                    [] 6 sq/cm    [] 16sq/cm   [] 30 sq/cm   [] 49 sq/cm        [] OASIS   [] 10.5 sq/cm   []21 sq/cm    []35 sq/cm Tri-Matrix  []70 sq/cm           Tri-Matrix      Skin Substitute Applied:  Performed by: RAFAEL Mccray CNP    Consent obtained: Yes    Time out taken: Yes     Fenestrated: No (alrady Fenestrated)    Instrument(s) scissors and forceps    Skin Substitute was Applied to Wound Number(s): Wound #: 1      Negative Pressure Wound Therapy (Active)   Unit Type KCI 06/08/22 1015   Dressing Type Black Foam 06/08/22 1015   Number of pieces used 1 06/15/22 1126   Number of pieces removed 1 06/08/22 1015   Cycle Continuous 06/15/22 1126   Target Pressure (mmHg) 125 06/15/22 1126   Canister changed?  Yes 05/25/22 1126   Dressing Changed Changed/New 05/25/22 1126   Dressing Change Due 05/27/22 05/25/22 1126   Number of days: 21   Wound 05/11/22 #1 (onset 2 weeks) Left Medial Foot Amp Site (Active)   Wound Image   06/08/22 1015   Wound Etiology Surgical 06/15/22 1000   Dressing Status New dressing applied 06/15/22 1126   Wound Cleansed Soap and water 06/15/22 1000   Dressing/Treatment Alginate 05/18/22 1123   Offloading for Diabetic Foot Ulcers Post op shoe 06/15/22 1126   Wound Length (cm) 8.5 cm 06/15/22 1000   Wound Width (cm) 2.9 cm 06/15/22 1000   Wound Depth (cm) 0.4 cm 06/15/22 1000   Wound Surface Area (cm^2) 24.65 cm^2 06/15/22 1000   Change in Wound Size % (l*w) 52.09 06/15/22 1000   Wound Volume (cm^3) 9.86 cm^3 06/15/22 1000   Wound Healing % 62 06/15/22 1000   Post-Procedure Length (cm) 8.5 cm 06/15/22 1028   Post-Procedure Width (cm) 2.9 cm 06/15/22 1028   Post-Procedure Depth (cm) 0.4 cm 06/15/22 1028   Post-Procedure Surface Area (cm^2) 24.65 cm^2 06/15/22 1028   Post-Procedure Volume (cm^3) 9.86 cm^3 06/15/22 1028   Distance Tunneling (cm) 0 cm 06/15/22 1000   Tunneling Position ___ O'Clock 0 06/15/22 1000   Undermining Starts ___ O'Clock 0 06/15/22 1000   Undermining Ends___ O'Clock 0 06/15/22 1000   Undermining Maxium Distance (cm) 0 06/15/22 1000   Wound Assessment Pink/red;Slough 06/15/22 1000   Drainage Amount Moderate 06/15/22 1000   Drainage Description Serosanguinous 06/15/22 1000   Odor None 06/15/22 1000   Rosalie-wound Assessment Intact 06/15/22 1000   Margins Defined edges 06/15/22 1000   Wound Thickness Description not for Pressure Injury Full thickness 06/15/22 1000   Number of days: 35       Wound 05/11/22 #2 (onset unknown) Left Plantar (Active)   Wound Image   06/08/22 1015   Dressing Status New dressing applied 06/15/22 1126   Wound Cleansed Soap and water 06/15/22 1000   Dressing/Treatment Alginate 05/18/22 1123   Offloading for Diabetic Foot Ulcers Post op shoe 06/15/22 1126   Wound Length (cm) 1.4 cm 06/15/22 1000 Wound Width (cm) 1.5 cm 06/15/22 1000   Wound Depth (cm) 0.2 cm 06/15/22 1000   Wound Surface Area (cm^2) 2.1 cm^2 06/15/22 1000   Change in Wound Size % (l*w) -31.25 06/15/22 1000   Wound Volume (cm^3) 0.42 cm^3 06/15/22 1000   Wound Healing % -31 06/15/22 1000   Post-Procedure Length (cm) 1.4 cm 06/15/22 1028   Post-Procedure Width (cm) 1.5 cm 06/15/22 1028   Post-Procedure Depth (cm) 0.2 cm 06/15/22 1028   Post-Procedure Surface Area (cm^2) 2.1 cm^2 06/15/22 1028   Post-Procedure Volume (cm^3) 0.42 cm^3 06/15/22 1028   Distance Tunneling (cm) 0 cm 06/15/22 1000   Tunneling Position ___ O'Clock 0 06/15/22 1000   Undermining Starts ___ O'Clock 0 06/15/22 1000   Undermining Ends___ O'Clock 0 06/15/22 1000   Undermining Maxium Distance (cm) 0 06/15/22 1000   Wound Assessment Pink/red;Slough 06/15/22 1000   Drainage Amount Moderate 06/15/22 1000   Drainage Description Yellow 06/15/22 1000   Odor None 06/15/22 1000   Rosalie-wound Assessment Hyperkeratosis (callous) 06/15/22 1000   Margins Defined edges 06/15/22 1000   Wound Thickness Description not for Pressure Injury Full thickness 06/15/22 1000   Number of days: 35       Wound 05/11/22 #3 (onset unknown) Right Plantar (Active)   Wound Image   06/08/22 1015   Dressing Status New dressing applied 06/15/22 1126   Wound Cleansed Soap and water 06/15/22 1000   Offloading for Diabetic Foot Ulcers Walker 06/15/22 1000   Wound Length (cm) 0.7 cm 06/15/22 1000   Wound Width (cm) 0.1 cm 06/15/22 1000   Wound Depth (cm) 0.1 cm 06/15/22 1000   Wound Surface Area (cm^2) 0.07 cm^2 06/15/22 1000   Change in Wound Size % (l*w) 94.81 06/15/22 1000   Wound Volume (cm^3) 0.007 cm^3 06/15/22 1000   Wound Healing % 99 06/15/22 1000   Post-Procedure Length (cm) 0.7 cm 06/15/22 1028   Post-Procedure Width (cm) 0.1 cm 06/15/22 1028   Post-Procedure Depth (cm) 0.1 cm 06/15/22 1028   Post-Procedure Surface Area (cm^2) 0.07 cm^2 06/15/22 1028   Post-Procedure Volume (cm^3) 0.007 cm^3 06/15/22 1028   Distance Tunneling (cm) 0 cm 06/15/22 1000   Tunneling Position ___ O'Clock 0 06/15/22 1000   Undermining Starts ___ O'Clock 0 06/15/22 1000   Undermining Ends___ O'Clock 0 06/15/22 1000   Undermining Maxium Distance (cm) 0 06/15/22 1000   Wound Assessment Pink/red 06/15/22 1000   Drainage Amount Moderate 06/15/22 1000   Drainage Description Serosanguinous 06/15/22 1000   Odor None 06/15/22 1000   Rosalie-wound Assessment Intact 06/15/22 1000   Margins Defined edges 06/15/22 1000   Wound Thickness Description not for Pressure Injury Full thickness 06/15/22 1000   Number of days: 35         Total Surface Area Covered 16 sq/cm    Was the Product Layered  No      Amount Wasted 0 sq/cm    Reason for Waste: material provided was greater than wound size      Secured: Yes    Secured With:   [] N/A  [x]Steri Strips    []Sutures     []Staples [x]Other Versitel    Procedural Pain: 0/10     Post Procedural Pain: 0 / 10    Response to Treatment:  Well tolerated by patient. Status of wound progress and description from last visit:   Improving. Wound number debrided and grafted, continue NPWT. Wound 2 & 3 debride. Plan:       Discharge Instructions       PHYSICIAN ORDERS AND DISCHARGE INSTRUCTIONS     NOTE: Upon discharge from the [address], you will receive a patient experience survey. We would be grateful if you would take the time to fill this survey out.     Wound care order history:                 YADY's   Right       Left               Date:     Continuing wound care orders and information:                 Residence:  Private              Continue home health care with: PICC LINE ONLY                Your wound-care supplies will be provided by:      Wound cleansing:               DS not scrub or use excessive force.              Wash hands with soap and water before and after dressing changes.               PNIVR to applying a clean dressing, cleanse wound with normal saline, wound cleanser, or mild soap and water.               Ask the physician or nurse before getting the wound(s) wet in a shower     Daily Wound management:              Keep weight off wounds and reposition every 2 hours.              Avoid standing for long periods of time.              Apply wraps/stockings in AM and remove at bedtime.              If swelling is present, elevate legs to the level of the heart or above for 30 minutes 4-5 times a day and/or when sitting.                                      When taking antibiotics take entire prescription as ordered by physician do not stop taking until medicine is all gone.                              Wound Care Notes:   Rx: [address] [facility]  Right and Left foot wounds cultured 11/4/21  Apply for wound vac 05/11/22 will arrive 05/18/22  Apply for grafts 05/11/22: aLL GRAFTS approved 06/08/22 for Left Toe amp site   Santyl ordered 05/11/22        Grafts Left Foot Amp Site   Puraply #1 4x4 fenestrated 06/15/22                               Orders for this week:  06/15/22                 [facility] ORDERS TO [facility]      Right AND LEFT PLANTAR Plantar Foot  Wash with soap and water, pat dry. APPLY ANSASEPT AND STIMULEN and foam cut to size of wound TO WOUND BED   COVER WITH SORBEX AND CONFORM      Left Foot Toe amp site  -   Wash with soap and water, pat dry. Puraply #1 appplied using mastisol, versatel and steristrips (DO NOT REMOVE: GRAFT IN PLACE  )   WOUND VAC THERAPY: left foot toe amputation site: . As stated below  DUODERM TO PERIWOUND FOR PROTECTION. APPLY BLACK FOAM TO WOUND.  MAY USE MEPITEL TO WOUND BED TO PREVENT BLACK FOAM FROM ADHERING TO WOUND BED. SECURE VAC DRESSING WITH DRAPE. SET WOUND VAC  CONTINUOUS SUCTION. CANISTER CHANGE WEEKLY OR ACCORDING TO VOLUME OF DRAINAGE.     WOUND VAC DRESSING TO BE CHANGED MON & FRI BY HOME CARE: DO not remove versatel: GRAFT IN PLACE   WOUND CLINIC WILL CHANGE ON WEDNESDAYS.     Follow up in 1 week in the wound care center.    Primary Wound Care Provider: Dr Dieudonne Bolton   Call  for any questions or concerns.   Date__________   Time____________        Treatment Note Wound 05/11/22 #1 (onset 2 weeks) Left Medial Foot Amp Site-Dressing/Treatment:  (Duoderm, Black foam)  Wound 05/11/22 #2 (onset unknown) Left Plantar-Dressing/Treatment:  (Anasept, Stimulen, Sorbex, Conform)  Wound 05/11/22 #3 (onset unknown) Right Plantar-Dressing/Treatment:  (Anasept, Stimulen, Sorbex, Conform)    Written Patient Dismissal Instructions Given            Electronically signed by RAFAEL Cottrell CNP on 6/15/2022 at 12:08 PM

## 2022-06-22 ENCOUNTER — OFFICE VISIT (OUTPATIENT)
Dept: FAMILY MEDICINE CLINIC | Age: 42
End: 2022-06-22
Payer: COMMERCIAL

## 2022-06-22 ENCOUNTER — HOSPITAL ENCOUNTER (OUTPATIENT)
Dept: WOUND CARE | Age: 42
Discharge: HOME | End: 2022-06-22
Payer: COMMERCIAL

## 2022-06-22 VITALS
DIASTOLIC BLOOD PRESSURE: 89 MMHG | SYSTOLIC BLOOD PRESSURE: 154 MMHG | HEART RATE: 88 BPM | TEMPERATURE: 99.2 F | RESPIRATION RATE: 16 BRPM

## 2022-06-22 VITALS
BODY MASS INDEX: 24.96 KG/M2 | HEIGHT: 72 IN | SYSTOLIC BLOOD PRESSURE: 130 MMHG | DIASTOLIC BLOOD PRESSURE: 86 MMHG | HEART RATE: 83 BPM | WEIGHT: 184.3 LBS | OXYGEN SATURATION: 96 %

## 2022-06-22 DIAGNOSIS — L97.422 DIABETIC ULCER OF LEFT MIDFOOT ASSOCIATED WITH TYPE 2 DIABETES MELLITUS, WITH FAT LAYER EXPOSED (HCC): Primary | ICD-10-CM

## 2022-06-22 DIAGNOSIS — F32.2 CURRENT SEVERE EPISODE OF MAJOR DEPRESSIVE DISORDER WITHOUT PSYCHOTIC FEATURES WITHOUT PRIOR EPISODE (HCC): ICD-10-CM

## 2022-06-22 DIAGNOSIS — L97.412 DIABETIC ULCER OF RIGHT MIDFOOT ASSOCIATED WITH TYPE 2 DIABETES MELLITUS, WITH FAT LAYER EXPOSED (HCC): ICD-10-CM

## 2022-06-22 DIAGNOSIS — F43.21 GRIEF: Primary | ICD-10-CM

## 2022-06-22 DIAGNOSIS — F41.9 ANXIETY: ICD-10-CM

## 2022-06-22 DIAGNOSIS — Z89.432 PARTIAL NONTRAUMATIC AMPUTATION OF FOOT, LEFT (HCC): ICD-10-CM

## 2022-06-22 DIAGNOSIS — E11.40 TYPE 2 DIABETES MELLITUS WITH DIABETIC NEUROPATHY, WITHOUT LONG-TERM CURRENT USE OF INSULIN (HCC): ICD-10-CM

## 2022-06-22 DIAGNOSIS — R45.851 PASSIVE SUICIDAL IDEATIONS: ICD-10-CM

## 2022-06-22 DIAGNOSIS — E11.621 DIABETIC ULCER OF RIGHT MIDFOOT ASSOCIATED WITH TYPE 2 DIABETES MELLITUS, WITH FAT LAYER EXPOSED (HCC): ICD-10-CM

## 2022-06-22 DIAGNOSIS — E11.621 DIABETIC ULCER OF LEFT MIDFOOT ASSOCIATED WITH TYPE 2 DIABETES MELLITUS, WITH FAT LAYER EXPOSED (HCC): Primary | ICD-10-CM

## 2022-06-22 PROCEDURE — 15275 SKIN SUB GRAFT FACE/NK/HF/G: CPT

## 2022-06-22 PROCEDURE — 99213 OFFICE O/P EST LOW 20 MIN: CPT | Performed by: PHYSICIAN ASSISTANT

## 2022-06-22 PROCEDURE — 1036F TOBACCO NON-USER: CPT | Performed by: PHYSICIAN ASSISTANT

## 2022-06-22 PROCEDURE — G8427 DOCREV CUR MEDS BY ELIG CLIN: HCPCS | Performed by: PHYSICIAN ASSISTANT

## 2022-06-22 PROCEDURE — G8417 CALC BMI ABV UP PARAM F/U: HCPCS | Performed by: PHYSICIAN ASSISTANT

## 2022-06-22 RX ORDER — LIDOCAINE 50 MG/G
OINTMENT TOPICAL ONCE
Status: CANCELLED | OUTPATIENT
Start: 2022-06-22 | End: 2022-06-22

## 2022-06-22 RX ORDER — LIDOCAINE HYDROCHLORIDE 40 MG/ML
SOLUTION TOPICAL ONCE
Status: CANCELLED | OUTPATIENT
Start: 2022-06-22 | End: 2022-06-22

## 2022-06-22 RX ORDER — LIDOCAINE 40 MG/G
CREAM TOPICAL ONCE
Status: CANCELLED | OUTPATIENT
Start: 2022-06-22 | End: 2022-06-22

## 2022-06-22 RX ORDER — BACITRACIN ZINC AND POLYMYXIN B SULFATE 500; 1000 [USP'U]/G; [USP'U]/G
OINTMENT TOPICAL ONCE
Status: CANCELLED | OUTPATIENT
Start: 2022-06-22 | End: 2022-06-22

## 2022-06-22 RX ORDER — GINSENG 100 MG
CAPSULE ORAL ONCE
Status: CANCELLED | OUTPATIENT
Start: 2022-06-22 | End: 2022-06-22

## 2022-06-22 RX ORDER — CLOBETASOL PROPIONATE 0.5 MG/G
OINTMENT TOPICAL ONCE
Status: CANCELLED | OUTPATIENT
Start: 2022-06-22 | End: 2022-06-22

## 2022-06-22 RX ORDER — BETAMETHASONE DIPROPIONATE 0.05 %
OINTMENT (GRAM) TOPICAL ONCE
Status: CANCELLED | OUTPATIENT
Start: 2022-06-22 | End: 2022-06-22

## 2022-06-22 RX ORDER — GENTAMICIN SULFATE 1 MG/G
OINTMENT TOPICAL ONCE
Status: CANCELLED | OUTPATIENT
Start: 2022-06-22 | End: 2022-06-22

## 2022-06-22 RX ORDER — BACITRACIN, NEOMYCIN, POLYMYXIN B 400; 3.5; 5 [USP'U]/G; MG/G; [USP'U]/G
OINTMENT TOPICAL ONCE
Status: CANCELLED | OUTPATIENT
Start: 2022-06-22 | End: 2022-06-22

## 2022-06-22 ASSESSMENT — PAIN SCALES - GENERAL: PAINLEVEL_OUTOF10: 0

## 2022-06-22 NOTE — PROGRESS NOTES
PuraPly AMTreatment Note    NAME:  Ingris Cortés OF BIRTH:  1980  MEDICAL RECORD NUMBER:  [id number]  DATE:  6/22/2022    Goal:  Patient will receive safe and proper application of skin substitute. Patient will comply with caring for dressing, and reporting complications. Expiration date checked immediately prior to use. Package intact prior to use and no damage noted. Transport temperature controlled and acceptable. PuraPly AM was removed from protective sterile packaging by provider and applied to prepared ulcer bed. PuraPly AM was hydrated with sterile normal saline per provider. PuraPly AM was applied to Left foot amp site wound and affixed with steri-strips by the provider. PuraPly AM was covered with non-adherent ulcer dressing. Applied ca alginate, sorbex, coban 2 lite  over non-adherent. Applied dry gauze and/or roll gauze. Patient/caregiver was instructed not to remove dressing and to keep it clean and dry. Pt/family/caregiver was instructed on signs and symptoms of complications to report such as draining through dressing, dressing falling down/slipping, getting wet, or severe pain or tingling. PuraPly AM may be applied a total of 10 times per wound over a 12 week period. Date of first application of PuraPly for this current wound is Delaney 15, 2022.     Guidelines followed    Electronically signed by Petra Vázquez RN on 6/22/2022 at 12:37 PM

## 2022-06-22 NOTE — PROGRESS NOTES
6/22/2022    José Miguel Nath    Chief Complaint   Patient presents with    2 Week Follow-Up       HPI  History was obtained from pt. Remington Ocasio is a 39 y.o. male with a PMHx as listed below who presents today for 2 week follow up. Pt was having severe depression and anxiety with passive suicidal ideations after the unexpected death of his mother. He says the suicidal thoughts are gone but he is still very depressed. He is on a 2-3 month waiting list for mental health. Pt had the wound vac removed and there is some kind of graft in place. 1. Grief    2. Anxiety    3. Current severe episode of major depressive disorder without psychotic features without prior episode ([address]    4.  Passive suicidal ideations         REVIEW OF SYMPTOMS    Review of Systems    PAST MEDICAL HISTORY  Past Medical History:   Diagnosis Date    Abscess of left foot 4/22/2022    Anemia associated with acute blood loss 4/26/2022    Callus of foot     Right foot - see's Dr. Aria Rubio Cellulitis of left foot 4/22/2022    Diabetic ulcer of left midfoot associated with type 2 diabetes mellitus, with muscle involvement without evidence of necrosis ([address] 4/26/2022    Diabetic ulcer of left midfoot associated with type 2 diabetes mellitus, with necrosis of muscle ([address] 6/7/2021    Diabetic ulcer of right midfoot associated with type 2 diabetes mellitus, with fat layer exposed ([address] 8/11/2020    Dizziness     positional    Essential hypertension     Follows with PCP    Hyperlipidemia     Lumbar radiculopathy     Septic embolism ([address] 6/13/2020    Subacute osteomyelitis of left foot ([address] 4/22/2022       FAMILY HISTORY  Family History   Problem Relation Age of Onset    Heart Disease Father     Diabetes Father     Diabetes Mother     Heart Disease Mother     Other Mother     Kidney Disease Mother     Heart Attack Mother        SOCIAL HISTORY  Social History     Socioeconomic History    Marital status: Single     Spouse name: Not on file    Number of children: Not on file    Years of education: Not on file    Highest education level: Not on file   Occupational History    Not on file   Tobacco Use    Smoking status: Never Smoker    Smokeless tobacco: Never Used   Vaping Use    Vaping Use: Never used   Substance and Sexual Activity    Alcohol use: Yes     Comment: \"couple beers a night\"    Drug use: No    Sexual activity: Yes     Partners: Female   Other Topics Concern    Not on file   Social History Narrative    Not on file     Social Determinants of Health     Financial Resource Strain: Low Risk     Difficulty of Paying Living Expenses: Not hard at all   Food Insecurity: No Food Insecurity    Worried About [address] in the Last Year: Never true    [employer] in the Last Year: Never true   Transportation Needs:     Lack of Transportation (Medical): Not on file    Lack of Transportation (Non-Medical):  Not on file   Physical Activity:     Days of Exercise per Week: Not on file    Minutes of Exercise per Session: Not on file   Stress:     Feeling of Stress : Not on file   Social Connections:     Frequency of Communication with Friends and Family: Not on file    Frequency of Social Gatherings with Friends and Family: Not on file    Attends Holiness Services: Not on file    Active Member of [address] [place] [address] [employer] or Organizations: Not on file    Attends Club or Organization Meetings: Not on file    Marital Status: Not on file   Intimate Partner Violence:     Fear of Current or Ex-Partner: Not on file    Emotionally Abused: Not on file    Physically Abused: Not on file    Sexually Abused: Not on file   Housing Stability:     Unable to Pay for Housing in the Last Year: Not on file    Number of [place] in the Last Year: Not on file    Unstable Housing in the Last Year: Not on file        SURGICAL HISTORY  Past Surgical History:   Procedure Laterality Date    ACHILLES TENDON SURGERY Right 1/8/2021    RIGHT ACHILLES TENDON LENGTHENING REPAIR performed by Hernan Chaudhry DPM at [facility]  03/2018    [facility]    DENTAL SURGERY      teeth extractions -half per patient    HERNIA REPAIR Bilateral 5/27/2020    BIALTERAL HERNIA INGUINAL REPAIR performed by Lester Carrion MD at [address] [facility]  2018    DC OFFICE/OUTPT VISIT,PROCEDURE ONLY Left 4/17/2018    L5-S1 HEMILAMINECTOMY, REMOVAL OF DISC LEFT SIDE performed by Mike Marquez MD at [address] Right 1/8/2021    RIGHT TRANSMETATARSAL TOE AMPUTATION performed by Hernan Chaudhry DPM at [address] Left 4/23/2022    LEFT RAY GREAT TOE AMPUTATION performed by Brenda Parham MD at [address] [facility]               CURRENT MEDICATIONS  Current Outpatient Medications   Medication Sig Dispense Refill    collagenase (SANTYL) 250 UNIT/GM ointment Per instructions DAILY (route: topical)      citalopram (CELEXA) 20 mg tablet Take 1 tablet by mouth daily 30 tablet 3    LORazepam (ATIVAN) 0.5 MG tablet Take 1 tablet by mouth nightly as needed (insomnia) for up to 30 days.  30 tablet 0    glyBURIDE (DIABETA) 5 MG tablet Take 10 mg by mouth in the morning and at bedtime      FLUoxetine (PROZAC) 20 MG capsule Take 20 mg by mouth 2 times daily      Alcohol Swabs PADS       insulin glargine (LANTUS SOLOSTAR) 100 UNIT/ML injection pen Inject 24 Units into the skin nightly 3 pen 2    metFORMIN (GLUCOPHAGE) 500 MG tablet Take 2 tablets by mouth 2 times daily (with meals) Indications: Start tomorrow 03/14 360 tablet 1    pioglitazone (ACTOS) 30 MG tablet Take 1 tablet by mouth daily 90 tablet 1    atorvastatin (LIPITOR) 40 MG tablet Take 1 tablet by mouth nightly 90 tablet 1    losartan (COZAAR) 25 MG tablet Take 1 tablet by mouth daily 30 tablet 5    metoprolol succinate (TOPROL XL) 25 MG extended release tablet Take 1 tablet by mouth daily 90 tablet 1    blood sounds: Normal heart sounds. No murmur heard. No friction rub. No gallop. Pulmonary:      Effort: Pulmonary effort is normal.      Breath sounds: Normal breath sounds. No wheezing, rhonchi or rales. Abdominal:      General: Bowel sounds are normal. There is no distension. Palpations: Abdomen is soft. There is no mass. Tenderness: There is no abdominal tenderness. There is no right CVA tenderness, left CVA tenderness, guarding or rebound. Musculoskeletal:         General: No deformity. Normal range of motion. Cervical back: Normal range of motion and neck supple. No rigidity. No muscular tenderness. Right lower leg: No edema. Left lower leg: No edema. Comments: Left foot bandaged   Skin:     General: Skin is warm and dry. Capillary Refill: Capillary refill takes less than 2 seconds. Findings: No bruising, erythema or rash. Neurological:      General: No focal deficit present. Mental Status: He is alert and oriented to person, place, and time. Coordination: Coordination normal.      Gait: Gait normal.   Psychiatric:         Mood and Affect: Mood normal.         Behavior: Behavior normal.         ASSESSMENT & PLAN    1. Grief  stable    2. Anxiety  improving    3. Current severe episode of major depressive disorder without psychotic features without prior episode ([facility]  improving    4. Passive suicidal ideations  resolved      No follow-ups on file. Electronically signed by PERCY Goncalves on 6/22/2022      Comment: Please note this report has been produced using speech recognition software and may contain errors related to that system including errors in grammar, punctuation, and spelling, as well as words and phrases that may be inappropriate. If there are any questions or concerns please feel free to contact the dictating provider for clarification.

## 2022-06-23 NOTE — PROGRESS NOTES
5/27/2020    BIALTERAL HERNIA INGUINAL REPAIR performed by Ricardo Garland MD at [address] [place] [address]    MD OFFICE/OUTPT VISIT,PROCEDURE ONLY Left 4/17/2018    L5-S1 HEMILAMINECTOMY, REMOVAL OF DISC LEFT SIDE performed by Cori Danielson MD at [address] Right 1/8/2021    RIGHT TRANSMETATARSAL TOE AMPUTATION performed by Sonam Carrera DPM at [facility] TOE AMPUTATION Left 4/23/2022    LEFT RAY GREAT TOE AMPUTATION performed by Raymond Bernard MD at [address] [facility]         FAMILY HISTORY    Family History   Problem Relation Age of Onset    Heart Disease Father     Diabetes Father     Diabetes Mother     Heart Disease Mother     Other Mother     Kidney Disease Mother     Heart Attack Mother        SOCIAL HISTORY    Social History     Tobacco Use    Smoking status: Never Smoker    Smokeless tobacco: Never Used   Vaping Use    Vaping Use: Never used   Substance Use Topics    Alcohol use: Yes     Comment: \"couple beers a night\"    Drug use: No       ALLERGIES    No Known Allergies    MEDICATIONS    Current Outpatient Medications on File Prior to Encounter   Medication Sig Dispense Refill    collagenase (SANTYL) 250 UNIT/GM ointment Per instructions DAILY (route: topical)      citalopram (CELEXA) 20 mg tablet Take 1 tablet by mouth daily 30 tablet 3    LORazepam (ATIVAN) 0.5 MG tablet Take 1 tablet by mouth nightly as needed (insomnia) for up to 30 days.  30 tablet 0    glyBURIDE (DIABETA) 5 MG tablet Take 10 mg by mouth in the morning and at bedtime      FLUoxetine (PROZAC) 20 MG capsule Take 20 mg by mouth 2 times daily      Alcohol Swabs PADS       insulin glargine (LANTUS SOLOSTAR) 100 UNIT/ML injection pen Inject 24 Units into the skin nightly 3 pen 2    metFORMIN (GLUCOPHAGE) 500 MG tablet Take 2 tablets by mouth 2 times daily (with meals) Indications: Start tomorrow 03/14 360 tablet 1    pioglitazone (ACTOS) 30 MG tablet Take 1 tablet by mouth daily 90 tablet 1    atorvastatin (LIPITOR) 40 MG tablet Take 1 tablet by mouth nightly 90 tablet 1    losartan (COZAAR) 25 MG tablet Take 1 tablet by mouth daily 30 tablet 5    metoprolol succinate (TOPROL XL) 25 MG extended release tablet Take 1 tablet by mouth daily 90 tablet 1    blood glucose monitor strips Test 2 times a day & as needed for symptoms of irregular blood glucose. Dispense sufficient amount for indicated testing frequency plus additional to accommodate PRN testing needs. 100 strip 0    omeprazole (PRILOSEC) 20 MG delayed release capsule Take 1 capsule by mouth once daily in the morning 90 capsule 1    ceFAZolin (ANCEF) 1 g injection Infuse 2,000 mg intravenously every 8 hours 180 each 1    blood glucose monitor kit and supplies Dispense sufficient amount for indicated testing frequency plus additional to accommodate PRN testing needs. Dispense all needed supplies to include: monitor, strips, lancing device, lancets, control solutions, alcohol swabs. 1 kit 0    FreeStyle Lancets MISC 1 each by Does not apply route daily 100 each 3    acetaminophen (TYLENOL) 325 MG tablet Take 2 tablets by mouth every 4 hours as needed for Pain or Fever 120 tablet 3     No current facility-administered medications on file prior to encounter. REVIEW OF SYSTEMS    Pertinent items are noted in HPI. Constitutional: Negative for systemic symptoms including fever, chills and malaise. Objective:      BP (!) 154/89   Pulse 88   Temp 99.2 °F (37.3 °C) (Temporal)   Resp 16     PHYSICAL EXAM      General: The patient is in no acute distress. Mental status:  Patient is appropriate, is  oriented to place and plan of care.   Dermatologic exam: Visual inspection of the periwound reveals the skin to be moist  Wound exam: see wound description below in procedure note      Assessment:     Problem List Items Addressed This Visit     WD-Diabetic ulcer of left midfoot associated with type 2 diabetes mellitus, with fat layer exposed ([address] - Primary    WD-Diabetic ulcer of right midfoot associated with type 2 diabetes mellitus, with fat layer exposed ([address]    WD-Partial nontraumatic amputation of foot, left ([address]    Type 2 diabetes mellitus with diabetic neuropathy, without long-term current use of insulin ([address]          Conditions above and those listed in the past history are being treated appropriately and are considered under adequate control so as not to preclude the use of a graft. SKIN SUBSTITUTES/GRAFT APPLICATIONS PROCEDURE NOTE    Indicaton:     Chronic ulcer(s) of the left medial foot  that has(have) failed to heal with the usual wound protocol used for greater than 30 days. See Epic chart for previous modalities and details of previous treatment. The patient has no contraindications or evidence of infection. The patient's competency and support system is appropriate for proper care and follow up for the use of this graft, therefore a graft was placed as below. Procedure: The wound bed was cleansed and prepared as necessary to accept the graft. Debridement was required. Consent obtained: Yes    Time out taken: Yes    Using #15 blade scalpel sharp Excisional Debridement of the wound(s) was/were performed down through and including the removal of subcutaneous tissue. Devitalized Tissue Debrided:  slough and exudate      Bleeding:  Minimal    Hemostasis Achieved:  not needed    Procedural Pain:  2  / 10     Post Procedural Pain:  0 / 10     Having prepared the wound bed, the skin graft was completed as below.     Product Utilized:          [] APLIGRAF   []44 sq cm   []88 sq cm    []132 sq cm  []176 sq cm           [] DERMAGRAFT  [] 38 sq cm   []76 sq cm    []114 sq cm  []152 sq cm      [] NUSHIELD  [] 1.6 sq/cm disc   [] (2X3) 6 sq/cm    [] (2X4) 8 sq/cm         [] (3X4) 12 sq/cm  [] (4x4) 16 sq/cm   [] (4X6) 24 sq/cm         [] (6X6) 36 sq/cm        [] AFFINITY    [] (1.5 cm x 1.5cm) 2.25 cm   [] (2.5 cm x 2.5 cm) 6.25 cm         [] THERASKIN  [] 13 sq cm   []37.34 sq cm             [] EpiFix   [] 1.54 sq cm disc    [] 2 pieces (3.08 sq cm)    [] 3  pieces (4.62 sq  cm)   [] 6 sq/cm    [] 16 sq cm     [] 18 sq cm   Fenestrated        [] OASIS   [] 10.5 sq cm   []21 sq cm    []35 sq cm Tri-Matrix  []70 sq cm          Tri-Matrix                    [x] PURAPLY   [] 1.6 sq cm disc     [] 4 sq cm   [] 8 sq cm   [] 25sq cm  [] 54 sq cm  I used a  4 cm x 4 cm fenestrated piece. [] Grafix      [] 1.54 sq cm disc    [] 3 sq cm    [] 6 sq cm   [] 12 sq cm   [] 25 sq cm        Skin Substitute Applied:    Performed by: Rayshawn Santacruz MD    Consent obtained: Yes    Time out taken: Yes     Fenestrated: Yes    Skin Substitute was Applied to Wound Number(s):     1      Negative Pressure Wound Therapy (Active)   Unit Type KCI 06/08/22 1015   Dressing Type Black Foam 06/08/22 1015   Number of pieces used 1 06/15/22 1126   Number of pieces removed 1 06/08/22 1015   Cycle Continuous 06/15/22 1126   Target Pressure (mmHg) 125 06/15/22 1126   Canister changed?  Yes 05/25/22 1126   Dressing Changed Changed/New 05/25/22 1126   Dressing Change Due 05/27/22 05/25/22 1126   Number of days: 28       Incision 04/17/18 Back (Active)   Number of days: 1527       Wound 06/13/20 Tibial Right pt states reddened and rash like after a sunburn 6/11/2020 (Active)   Number of days: 739       Wound 05/11/22 #1 (onset 2 weeks) Left Medial Foot Amp Site (Active)   Wound Image   06/08/22 1015   Wound Etiology Surgical 06/22/22 1037   Dressing Status New dressing applied 06/22/22 1208   Wound Cleansed Cleansed with saline 06/22/22 1037   Dressing/Treatment Alginate 05/18/22 1123   Offloading for Diabetic Foot Ulcers Post op shoe 06/22/22 1037   Wound Length (cm) 8 cm 06/22/22 1040   Wound Width (cm) 2.4 cm 06/22/22 1040   Wound Depth (cm) 0.4 cm 06/22/22 1040   Wound Surface Area (cm^2) 19.2 cm^2 06/22/22 1040   Change in Wound Size % (l*w) 62.68 06/22/22 1040   Wound Volume (cm^3) 7.68 cm^3 06/22/22 1040   Wound Healing % 70 06/22/22 1040   Post-Procedure Length (cm) 8 cm 06/22/22 1142   Post-Procedure Width (cm) 2.4 cm 06/22/22 1142   Post-Procedure Depth (cm) 0.4 cm 06/22/22 1142   Post-Procedure Surface Area (cm^2) 19.2 cm^2 06/22/22 1142   Post-Procedure Volume (cm^3) 7.68 cm^3 06/22/22 1142   Distance Tunneling (cm) 0 cm 06/22/22 1040   Tunneling Position ___ O'Clock 0 06/22/22 1040   Undermining Starts ___ O'Clock 3 06/22/22 1040   Undermining Ends___ O'Clock 5 06/22/22 1040   Undermining Maxium Distance (cm) 0.8 06/22/22 1040   Wound Assessment Pink/red 06/22/22 1040   Drainage Amount Moderate 06/22/22 1040   Drainage Description Serosanguinous 06/22/22 1040   Odor None 06/22/22 1040   Rosalie-wound Assessment Intact 06/22/22 1040   Margins Defined edges 06/22/22 1040   Wound Thickness Description not for Pressure Injury Full thickness 06/22/22 1040   Number of days: 42       Wound 05/11/22 #2 (onset unknown) Left Plantar (Active)   Wound Image   06/08/22 1015   Dressing Status New dressing applied 06/22/22 1208   Wound Cleansed Wound cleanser 06/22/22 1040   Dressing/Treatment Alginate 05/18/22 1123   Offloading for Diabetic Foot Ulcers Post op shoe 06/15/22 1126   Wound Length (cm) 1.5 cm 06/22/22 1040   Wound Width (cm) 1.4 cm 06/22/22 1040   Wound Depth (cm) 0.7 cm 06/22/22 1040   Wound Surface Area (cm^2) 2.1 cm^2 06/22/22 1040   Change in Wound Size % (l*w) -31.25 06/22/22 1040   Wound Volume (cm^3) 1.47 cm^3 06/22/22 1040   Wound Healing % -359 06/22/22 1040   Post-Procedure Length (cm) 1.4 cm 06/15/22 1028   Post-Procedure Width (cm) 1.5 cm 06/15/22 1028   Post-Procedure Depth (cm) 0.2 cm 06/15/22 1028   Post-Procedure Surface Area (cm^2) 2.1 cm^2 06/15/22 1028   Post-Procedure Volume (cm^3) 0.42 cm^3 06/15/22 1028   Distance Tunneling (cm) 0 cm 06/22/22 1040   Tunneling Position ___ O'Clock 0 06/22/22 500 [address] ___ O'Clock 0 06/22/22 1040   Undermining Ends___ O'Clock 0 06/22/22 1040   Undermining Maxium Distance (cm) 0 06/22/22 1040   Wound Assessment Pink/red 06/22/22 1040   Drainage Amount Moderate 06/22/22 1040   Drainage Description Yellow 06/22/22 1040   Odor None 06/22/22 1040   Rosalie-wound Assessment Hyperkeratosis (callous) 06/22/22 1040   Margins Defined edges 06/22/22 1040   Wound Thickness Description not for Pressure Injury Full thickness 06/22/22 1040   Number of days: 42       Wound 05/11/22 #3 (onset unknown) Right Plantar (Active)   Wound Image   06/08/22 1015   Dressing Status New dressing applied 06/22/22 1208   Wound Cleansed Wound cleanser 06/22/22 1040   Offloading for Diabetic Foot Ulcers Walker 06/15/22 1000   Wound Length (cm) 0.7 cm 06/22/22 1040   Wound Width (cm) 0.1 cm 06/22/22 1040   Wound Depth (cm) 0.5 cm 06/22/22 1040   Wound Surface Area (cm^2) 0.07 cm^2 06/22/22 1040   Change in Wound Size % (l*w) 94.81 06/22/22 1040   Wound Volume (cm^3) 0.035 cm^3 06/22/22 1040   Wound Healing % 94 06/22/22 1040   Post-Procedure Length (cm) 0.7 cm 06/15/22 1028   Post-Procedure Width (cm) 0.1 cm 06/15/22 1028   Post-Procedure Depth (cm) 0.1 cm 06/15/22 1028   Post-Procedure Surface Area (cm^2) 0.07 cm^2 06/15/22 1028   Post-Procedure Volume (cm^3) 0.007 cm^3 06/15/22 1028   Distance Tunneling (cm) 0 cm 06/22/22 1040   Tunneling Position ___ O'Clock 0 06/22/22 1040   Undermining Starts ___ O'Clock 0 06/22/22 1040   Undermining Ends___ O'Clock 0 06/22/22 1040   Undermining Maxium Distance (cm) 0 06/22/22 1040   Wound Assessment [place]/red;Slough 06/22/22 1040   Drainage Amount Small 06/22/22 1040   Drainage Description Purulent 06/22/22 1040   Odor None 06/22/22 1040   Rosalie-wound Assessment Intact 06/22/22 1040   Margins Defined edges 06/22/22 1040   Wound Thickness Description not for Pressure Injury Full thickness 06/22/22 1040   Number of days: 42         Total Surface Area Covered 19 sq/cm    Was the Product Layered  No      Amount Wasted 0 sq/cm    Reason for Waste: material provided was greater than wound size      Secured: Yes    Instruments used to complete placement of graft::scissors and forceps    Secured With:   [] N/A  [x]Steri Strips    []Sutures     []Staples []Other    Procedural Pain: 0/10     Post Procedural Pain: 0 / 10    Response to Treatment:  Well tolerated by patient. Status of wound progress and description from last visit:   Improving. Plan:     Discharge instructions:  Discharge Instructions       PHYSICIAN ORDERS AND DISCHARGE INSTRUCTIONS     NOTE: Upon discharge from the [address], you will receive a patient experience survey. We would be grateful if you would take the time to fill this survey out.     Wound care order history:                 YADY's   Right       Left               Date:       Wound cleansing:               KL not scrub or use excessive force.              Wash hands with soap and water before and after dressing changes.               QNKFD to applying a clean dressing, cleanse wound with normal saline, wound cleanser, or mild soap and water.               Ask the physician or nurse before getting the wound(s) wet in a shower     Daily Wound management:              Keep weight off wounds and reposition every 2 hours.              Avoid standing for long periods of time.              Apply wraps/stockings in AM and remove at bedtime.              If swelling is present, elevate legs to the level of the heart or above for 30 minutes 4-5 times a day and/or when sitting.                                      When taking antibiotics take entire prescription as ordered by physician do not stop taking until medicine is all gone.                              Wound Care Notes:   Rx: [address] [facility]  Right and Left foot wounds cultured 11/4/21  Apply for wound vac 05/11/22 will arrive 05/18/22  Apply for grafts 05/11/22: aLL GRAFTS approved 22 for Left Toe amp site   Santyl ordered 22         Grafts Left Foot Amp Site   Puraply #1 Graft #1 4x4 fenestrated 06/15/22    Puraply #2 Graft #2 4x4 fenestrated 22                                  Orders for this week:  22               FAX ORDERS TO [facility]!     RIGHT AND LEFT PLANTAR  WOUNDS: Wash with soap and water, pat dry. APPLY ANSASEPT AND STIMULEN and foam cut to size of wound TO WOUND BED   COVER WITH SORBEX wrap Right foot with conform and secure with tape.      Left Foot Toe amp site  -   Wash with soap and water, pat dry. Puraply #2 Graft #2 appplied using mastisol, versatel and steristrips (DO NOT REMOVE: GRAFT IN PLACE  )  Cover with ca algiante and sorbex   Wrap with coban 2 lite   Leave in place for 1 week     WOUND VAC THERAPY: Hold for  1 week stasrting 22  DUODERM TO PERIWOUND FOR PROTECTION. APPLY BLACK FOAM TO WOUND.  MAY USE MEPITEL TO WOUND BED TO PREVENT BLACK FOAM FROM ADHERING TO WOUND BED. SECURE VAC DRESSING WITH DRAPE. SET WOUND VAC  CONTINUOUS SUCTION. CANISTER CHANGE WEEKLY OR ACCORDING TO VOLUME OF DRAINAGE. WOUND VAC DRESSING TO BE CHANGED MON & FRI BY HOME CARE: DO not remove versatel: GRAFT IN PLACE        Follow Up Instructions: At the [address] in 1 week:   Primary Wound Care Provider: Dr Maritza Magnuson   Call  for any questions or concerns.   Central [phone]          Treatment Note Wound 22 #1 (onset 2 weeks) Left Medial Foot Amp Site-Dressing/Treatment:  (ca alginate, Sorbex, Coban 2)  Wound 22 #2 (onset unknown) Left Plantar-Dressing/Treatment:  (Anasept, Stimulen, Sorbex, Coban 2 Lite)  Wound 22 #3 (onset unknown) Right Plantar-Dressing/Treatment:  (Anasept, Stimulen, Sorbex, Conform)    Written Patient Dismissal Instructions Given            Electronically signed by Lulu Polanco MD on 2022 at 7:36 AM

## 2022-06-28 ENCOUNTER — TELEPHONE (OUTPATIENT)
Dept: WOUND CARE | Age: 42
End: 2022-06-28

## 2022-06-28 NOTE — TELEPHONE ENCOUNTER
Kadi Barraza at [facility] called to report that client requested discharge from home care, stating that he was able to do the wound care and didn't need home care any longer. Kadi Barraza states that Home care nurse Will, who has a good rapport with client, is planning on checking in with client to see if he's struggling with depression due to recent loss of his mother and limited support system.     Electronically signed by Maddie Minor RN on 6/28/2022 at 3:18 PM

## 2022-06-29 ENCOUNTER — HOSPITAL ENCOUNTER (OUTPATIENT)
Dept: WOUND CARE | Age: 42
Discharge: HOME | End: 2022-06-29

## 2022-06-30 ENCOUNTER — OFFICE VISIT (OUTPATIENT)
Dept: FAMILY MEDICINE CLINIC | Age: 42
End: 2022-06-30
Payer: COMMERCIAL

## 2022-06-30 ENCOUNTER — TELEPHONE (OUTPATIENT)
Dept: FAMILY MEDICINE CLINIC | Age: 42
End: 2022-06-30

## 2022-06-30 VITALS
SYSTOLIC BLOOD PRESSURE: 140 MMHG | DIASTOLIC BLOOD PRESSURE: 94 MMHG | WEIGHT: 185 LBS | HEART RATE: 70 BPM | HEIGHT: 72 IN | OXYGEN SATURATION: 99 % | BODY MASS INDEX: 25.06 KG/M2

## 2022-06-30 DIAGNOSIS — G47.00 INSOMNIA, UNSPECIFIED TYPE: ICD-10-CM

## 2022-06-30 DIAGNOSIS — F33.2 SEVERE EPISODE OF RECURRENT MAJOR DEPRESSIVE DISORDER, WITHOUT PSYCHOTIC FEATURES (HCC): ICD-10-CM

## 2022-06-30 DIAGNOSIS — E11.40 TYPE 2 DIABETES MELLITUS WITH DIABETIC NEUROPATHY, WITHOUT LONG-TERM CURRENT USE OF INSULIN (HCC): Primary | ICD-10-CM

## 2022-06-30 DIAGNOSIS — E11.40 TYPE 2 DIABETES MELLITUS WITH DIABETIC NEUROPATHY, WITHOUT LONG-TERM CURRENT USE OF INSULIN (HCC): ICD-10-CM

## 2022-06-30 PROCEDURE — 1036F TOBACCO NON-USER: CPT

## 2022-06-30 PROCEDURE — 2022F DILAT RTA XM EVC RTNOPTHY: CPT

## 2022-06-30 PROCEDURE — 3046F HEMOGLOBIN A1C LEVEL >9.0%: CPT

## 2022-06-30 PROCEDURE — G8417 CALC BMI ABV UP PARAM F/U: HCPCS

## 2022-06-30 PROCEDURE — 99214 OFFICE O/P EST MOD 30 MIN: CPT

## 2022-06-30 PROCEDURE — G8427 DOCREV CUR MEDS BY ELIG CLIN: HCPCS

## 2022-06-30 RX ORDER — INSULIN GLARGINE 100 [IU]/ML
30 INJECTION, SOLUTION SUBCUTANEOUS NIGHTLY
Qty: 3 PEN | Refills: 2 | Status: SHIPPED | OUTPATIENT
Start: 2022-06-30 | End: 2022-06-30 | Stop reason: SDUPTHER

## 2022-06-30 RX ORDER — HYDROXYZINE HYDROCHLORIDE 25 MG/1
25 TABLET, FILM COATED ORAL NIGHTLY
Qty: 30 TABLET | Refills: 2 | Status: SHIPPED | OUTPATIENT
Start: 2022-06-30 | End: 2022-07-30

## 2022-06-30 RX ORDER — FLUOXETINE HYDROCHLORIDE 40 MG/1
40 CAPSULE ORAL DAILY
Qty: 30 CAPSULE | Refills: 3 | Status: SHIPPED | OUTPATIENT
Start: 2022-06-30 | End: 2022-07-28 | Stop reason: SDUPTHER

## 2022-06-30 RX ORDER — FLUOXETINE HYDROCHLORIDE 20 MG/1
20 CAPSULE ORAL DAILY
Qty: 30 CAPSULE | Refills: 3 | Status: SHIPPED | OUTPATIENT
Start: 2022-06-30 | End: 2022-07-28 | Stop reason: DRUGHIGH

## 2022-06-30 RX ORDER — FLUOXETINE HYDROCHLORIDE 20 MG/1
20 CAPSULE ORAL DAILY
Qty: 30 CAPSULE | Refills: 3 | Status: SHIPPED | OUTPATIENT
Start: 2022-06-30 | End: 2022-06-30 | Stop reason: SDUPTHER

## 2022-06-30 RX ORDER — INSULIN GLARGINE 100 [IU]/ML
30 INJECTION, SOLUTION SUBCUTANEOUS NIGHTLY
Qty: 5 PEN | Refills: 2 | Status: SHIPPED | OUTPATIENT
Start: 2022-06-30 | End: 2022-09-28

## 2022-06-30 RX ORDER — FLUOXETINE HYDROCHLORIDE 40 MG/1
40 CAPSULE ORAL 2 TIMES DAILY
Qty: 30 CAPSULE | Refills: 3 | Status: SHIPPED | OUTPATIENT
Start: 2022-06-30 | End: 2022-06-30 | Stop reason: SDUPTHER

## 2022-06-30 NOTE — PROGRESS NOTES
6/30/2022    [facility]    Chief Complaint   Patient presents with    Other     pt believes bump on his left abdomen, unable to visualize  or palpate       HPI  History was obtained from patient. Claudia Sierra is a pleasant 39 y.o. male who presents today for concerns of a bump to his left abdomen. DM2: Patient is compliant with Actos, Metformin, Lantus. Pt had left great toe removed 5/25/2022. Last A1C 10.4 No toes on right foot- three toes left on left foot. Pt does monitor his blood sugars daily- his AM readings are generally in the 300s. Bump on Abdomen: Noticed yesterday. Denies pain. Pt has a history of bilateral inguinal hernia repairs- he believes with Dr. Glenys Craig. Depression: Pt reports worsening depression since his mother passed away in May. He experienced this before when his father passed away as well. Pt reports that he has had feelings of wanting to harm himself, but he has not made any plans nor has he made any attempts. Patient denies currently having a plan to harm himself. Patient states that he does not have local friends or family, he has a brother that lives in [place]. 1. Type 2 diabetes mellitus with diabetic neuropathy, without long-term current use of insulin ([facility]    2. Severe episode of recurrent major depressive disorder, without psychotic features ([facility]    3. Insomnia, unspecified type             REVIEW OF SYMPTOMS    Review of Systems   Constitutional: Negative for chills, fever and unexpected weight change. Respiratory: Negative for shortness of breath. Cardiovascular: Negative. Negative for chest pain, palpitations and leg swelling. Gastrointestinal: Negative for abdominal pain, blood in stool, constipation, diarrhea, nausea and vomiting. Genitourinary: Negative for difficulty urinating and hematuria. Skin: Negative for color change. Neurological: Negative for light-headedness. Psychiatric/Behavioral: Positive for sleep disturbance.  The patient is nervous/anxious.         PAST MEDICAL HISTORY  Past Medical History:   Diagnosis Date    Abscess of left foot 4/22/2022    Anemia associated with acute blood loss 4/26/2022    Callus of foot     Right foot - see's Dr. Delphine Flannery    Cellulitis of left foot 4/22/2022    Diabetic ulcer of left midfoot associated with type 2 diabetes mellitus, with muscle involvement without evidence of necrosis ([address] 4/26/2022    Diabetic ulcer of left midfoot associated with type 2 diabetes mellitus, with necrosis of muscle ([address] 6/7/2021    Diabetic ulcer of right midfoot associated with type 2 diabetes mellitus, with fat layer exposed ([address] 8/11/2020    Dizziness     positional    Essential hypertension     Follows with PCP    Hyperlipidemia     Lumbar radiculopathy     Septic embolism ([address] 6/13/2020    Subacute osteomyelitis of left foot ([address] 4/22/2022       FAMILY HISTORY  Family History   Problem Relation Age of Onset    Heart Disease Father     Diabetes Father     Diabetes Mother     Heart Disease Mother     Other Mother     Kidney Disease Mother     Heart Attack Mother        SOCIAL HISTORY  Social History     Socioeconomic History    Marital status: Single     Spouse name: None    Number of children: None    Years of education: None    Highest education level: None   Occupational History    None   Tobacco Use    Smoking status: Never Smoker    Smokeless tobacco: Never Used   Vaping Use    Vaping Use: Never used   Substance and Sexual Activity    Alcohol use: Yes     Comment: \"couple beers a night\"    Drug use: No    Sexual activity: Yes     Partners: Female   Other Topics Concern    None   Social History Narrative    None     Social Determinants of Health     Financial Resource Strain: Low Risk     Difficulty of Paying Living Expenses: Not hard at all   Food Insecurity: No Food Insecurity    Worried About Running Out of Food in the Last Year: Never true    [address] in the Last Year: Never true   Transportation Needs:     Lack of Transportation (Medical): Not on file    Lack of Transportation (Non-Medical):  Not on file   Physical Activity:     Days of Exercise per Week: Not on file    Minutes of Exercise per Session: Not on file   Stress:     Feeling of Stress : Not on file   Social Connections:     Frequency of Communication with Friends and Family: Not on file    Frequency of Social Gatherings with Friends and Family: Not on file    Attends Taoist Services: Not on file    Active Member of Clubs or Organizations: Not on file    Attends Club or Organization Meetings: Not on file    Marital Status: Not on file   Intimate Partner Violence:     Fear of Current or Ex-Partner: Not on file    Emotionally Abused: Not on file    Physically Abused: Not on file    Sexually Abused: Not on file   Housing Stability:     Unable to Pay for Housing in the Last Year: Not on file    Number of [place] in the Last Year: Not on file    Unstable Housing in the Last Year: Not on file        SURGICAL HISTORY  Past Surgical History:   Procedure Laterality Date    ACHILLES TENDON SURGERY Right 1/8/2021    RIGHT ACHILLES [address] [facility] performed by Toya Lazcano DPM at [place] [address]  03/2018    [facility]    DENTAL SURGERY      teeth extractions -half per patient   [address] [facility] Bilateral 5/27/2020    [address] performed by Greyson Bautista MD at [address] [place] [address]  2018    MA OFFICE/OUTPT [facility] Left 4/17/2018    L5-S1 HEMILAMINECTOMY, REMOVAL OF [address] LEFT SIDE performed by Melvina White MD at [address] [place] [address] Drive Right 1/8/2021    RIGHT TRANSMETATARSAL TOE AMPUTATION performed by Toya Lazcano DPM at [address] [place] [address] Left 4/23/2022    LEFT RAY GREAT TOE AMPUTATION performed by Mayra Alcala MD at [address] [place] [address] MEDICATIONS  Current Outpatient Medications   Medication Sig Dispense Refill    FLUoxetine (PROZAC) 40 MG capsule Take 1 capsule by mouth 2 times daily Take one 40mg tablet plus one 20mg tablet to total 60mg daily 30 capsule 3    FLUoxetine (PROZAC) 20 MG capsule Take 1 capsule by mouth daily Take one 40mg tablet plus one 20mg tablet to total 60mg daily 30 capsule 3    insulin glargine (LANTUS SOLOSTAR) 100 UNIT/ML injection pen Inject 30 Units into the skin nightly 3 pen 2    Dulaglutide 0.75 MG/0.5ML SOPN Inject 0.75 mg into the skin once a week 1 pen 3    hydrOXYzine HCl (ATARAX) 25 MG tablet Take 1 tablet by mouth nightly 30 tablet 2    collagenase (SANTYL) 250 UNIT/GM ointment Per instructions DAILY (route: topical)      LORazepam (ATIVAN) 0.5 MG tablet Take 1 tablet by mouth nightly as needed (insomnia) for up to 30 days. 30 tablet 0    glyBURIDE (DIABETA) 5 MG tablet Take 10 mg by mouth in the morning and at bedtime      Alcohol Swabs PADS       metFORMIN (GLUCOPHAGE) 500 MG tablet Take 2 tablets by mouth 2 times daily (with meals) Indications: Start tomorrow 03/14 360 tablet 1    pioglitazone (ACTOS) 30 MG tablet Take 1 tablet by mouth daily 90 tablet 1    atorvastatin (LIPITOR) 40 MG tablet Take 1 tablet by mouth nightly 90 tablet 1    losartan (COZAAR) 25 MG tablet Take 1 tablet by mouth daily 30 tablet 5    metoprolol succinate (TOPROL XL) 25 MG extended release tablet Take 1 tablet by mouth daily 90 tablet 1    blood glucose monitor strips Test 2 times a day & as needed for symptoms of irregular blood glucose. Dispense sufficient amount for indicated testing frequency plus additional to accommodate PRN testing needs.  100 strip 0    omeprazole (PRILOSEC) 20 MG delayed release capsule Take 1 capsule by mouth once daily in the morning 90 capsule 1    blood glucose monitor kit and supplies Dispense sufficient amount for indicated testing frequency plus additional to accommodate PRN testing needs. Dispense all needed supplies to include: monitor, strips, lancing device, lancets, control solutions, alcohol swabs. 1 kit 0    FreeStyle Lancets MISC 1 each by Does not apply route daily 100 each 3    acetaminophen (TYLENOL) 325 MG tablet Take 2 tablets by mouth every 4 hours as needed for Pain or Fever 120 tablet 3     No current facility-administered medications for this visit. ALLERGIES  No Known Allergies    PHYSICAL EXAM    BP (!) 140/88   Pulse 70   Ht 6' (1.829 m)   Wt 185 lb (83.9 kg)   SpO2 99%   BMI 25.09 kg/m²     Physical Exam  Vitals and nursing note reviewed. Constitutional:       Appearance: Normal appearance. He is well-developed. HENT:      Head: Normocephalic and atraumatic. Eyes:      General: No scleral icterus. Extraocular Movements: Extraocular movements intact. Conjunctiva/sclera: Conjunctivae normal.   Neck:      Thyroid: No thyromegaly. Vascular: No JVD. Trachea: No tracheal deviation. Cardiovascular:      Rate and Rhythm: Normal rate and regular rhythm. Pulses: Normal pulses. Heart sounds: Normal heart sounds. Pulmonary:      Effort: Pulmonary effort is normal. No respiratory distress. Breath sounds: Normal breath sounds. No wheezing or rales. Abdominal:      General: There is no distension. Palpations: Abdomen is soft. Tenderness: There is no abdominal tenderness. There is no guarding or rebound. Musculoskeletal:      Cervical back: Neck supple. Lymphadenopathy:      Cervical: No cervical adenopathy. Skin:     General: Skin is warm and dry. Nails: There is no clubbing. Neurological:      Mental Status: He is alert and oriented to person, place, and time. Mental status is at baseline. Comments: Nonfocal   Psychiatric:         Attention and Perception: Attention and perception normal. He does not perceive auditory or visual hallucinations. Mood and Affect: Mood is depressed.  Affect is flat and tearful. Speech: Speech normal.         Behavior: Behavior is withdrawn. Behavior is cooperative. Thought Content: Thought content does not include homicidal or suicidal ideation. Thought content does not include homicidal or suicidal plan. Judgment: Judgment normal.         ASSESSMENT & PLAN    Sharon Sosa was seen today for other. Diagnoses and all orders for this visit:    Type 2 diabetes mellitus with diabetic neuropathy, without long-term current use of insulin (HCC)  -     Discontinue: insulin glargine (LANTUS SOLOSTAR) 100 UNIT/ML injection pen; Inject 30 Units into the skin nightly  - Patient given written and verbal instructions to increase Lantus dose based on a.m. blood glucose readings every 2 to 3 days x 2 units at a time  -     Dulaglutide 0.75 MG/0.5ML SOPN; Inject 0.75 mg into the skin once a week  - Patient educated on importance of getting diabetes under control  - Patient is agreeable to changes and plan    Severe episode of recurrent major depressive disorder, without psychotic features (HCC)  -     Discontinue: FLUoxetine (PROZAC) 40 MG capsule; Take 1 capsule by mouth 2 times daily Take one 40mg tablet plus one 20mg tablet to total 60mg daily  -     Discontinue: FLUoxetine (PROZAC) 20 MG capsule; Take 1 capsule by mouth daily Take one 40mg tablet plus one 20mg tablet to total 60mg daily  - Patient is awaiting psych appointment locally    Insomnia, unspecified type  -     hydrOXYzine HCl (ATARAX) 25 MG tablet; Take 1 tablet by mouth nightly  - Difficulty sleeping related to anxiety  - Consider trazodone if ineffective        Return in about 4 weeks (around 7/28/2022).          Electronically signed by RAFAEL Gu CNP on 6/30/2022

## 2022-06-30 NOTE — LETTER
CONTROLLED SUBSTANCE MEDICATION AGREEMENT     Patient Name: Narinder Steve  Patient YOB: 1980   I understand, that controlled substance medications may be used to help better manage my symptoms and to improve my ability to function at home, work and in social settings. However, I also understand that these medications do have risks, which have been discussed with me, including possible development of physical or psychological dependence. I understand that successful treatment requires mutual trust and honesty between me and my provider. I understand and agree that following this Medication Agreement is necessary in continuing my provider-patient relationship and the success of my treatment plan. Explanation from my Provider: Benefits and Goals of Controlled Substance Medications: There are two potential goals for your treatment: (1) decreased pain and suffering (2) improved daily life functions. There are many possible treatments for your chronic condition(s). Alternatives such as physical therapy, yoga, massage, home daily exercise, meditation, relaxation techniques, injections, chiropractic manipulations, surgery, cognitive therapy, hypnosis and many medications that are not habit-forming may be used. Use of controlled substance medications may be helpful, but they are unlikely to resolve all symptoms or restore all function. Explanation from my Provider: Risks of Controlled Substance Medications:  Opioid pain medications: These medications can lead to problems such as addiction/dependence, sedation, lightheadedness/dizziness, memory issues, falls, constipation, nausea, or vomiting. They may also impair the ability to drive or operate machinery. Additionally, these medications may lower testosterone levels, leading to loss of bone strength, stamina and sex drive.   They may cause problems with breathing, sleep apnea and reduced coughing, which is especially dangerous for patients with lung disease. Overdose or dangerous interactions with alcohol and other medications may occur, leading to death. Hyperalgesia may develop, which means patients receiving opioids for the treatment of pain may become more sensitive to certain painful stimuli, and in some cases, experience pain from ordinarily non-painful stimuli. Women between the ages of 14-53 who could become pregnant should carefully weigh the risks and benefits of opioids with their physicians, as these medications increase the risk of pregnancy complications, including miscarriage,  delivery and stillbirth. It is also possible for babies to be born addicted to opioids. Opioid dependence withdrawal symptoms may include; feelings of uneasiness, increased pain, irritability, belly pain, diarrhea, sweats and goose-flesh. Benzodiazepines and non-benzodiazepine sleep medications: These medications can lead to problems such as addiction/dependence, sedation, fatigue, lightheadedness, dizziness, incoordination, falls, depression, hallucinations, and impaired judgment, memory and concentration. The ability to drive and operate machinery may also be affected. Abnormal sleep-related behaviors have been reported, including sleepwalking, driving, making telephone calls, eating, or having sex while not fully awake. These medications can suppress breathing and worsen sleep apnea, particularly when combined with alcohol or other sedating medications, potentially leading to death. Dependence withdrawal symptoms may include tremors, anxiety, hallucinations and seizures. Stimulants:  Common adverse effects include addiction/dependence, increased blood  pressure and heart rate, decreased appetite, nausea, involuntary weight loss, insomnia,                                                                                                                     Initials:_______   irritability, and headaches.   These risks may increase when these medications are combined with other stimulants, such as caffeine pills or energy drinks, certain weight loss supplements and oral decongestants. Dependence withdrawal symptoms may include depressed mood, loss of interest, suicidal thoughts, anxiety, fatigue, appetite changes and agitation. Testosterone replacement therapy:  Potential side effects include increased risk of stroke and heart attack, blood clots, increased blood pressure, increased cholesterol, enlarged prostate, sleep apnea, irritability/aggression and other mood disorders, and decreased fertility. I agree and understand that I and my prescriber have the following rights and responsibilities regarding my treatment plan:     1. MY RIGHTS:  To be informed of my treatment and medication plan. To be an active participant in my health and wellbeing. 2. MY RESPONSIBILITY AND UNDERSTANDING FOR USE OF MEDICATIONS   I will take medications at the dose and frequency as directed. For my safety, I will not increase or change how I take my medications without the recommendation of my healthcare provider.  I will actively participate in any program recommended by my provider which may improve function, including social, physical, psychological programs.  I will not take my medications with alcohol or other drugs not prescribed to me. I understand that drinking alcohol with my medications increases the chances of side effects, including reduced breathing rate and could lead to personal injury when operating machinery.  I understand that if I have a history of substance use disorders, including alcohol or other illicit drugs, that I may be at increased risk of addiction to my medications.  I agree to notify my provider immediately if I should become pregnant so that my treatment plan can be adjusted.    I agree and understand that I shall only receive controlled substance medications from the prescriber that signed this agreement unless there is written agreement among other prescribers of controlled substances outlining the responsibility of the medications being prescribed.  I understand that the if the controlled medication is not helping to achieve goals, the dosage may be tapered and no longer prescribed. 3. MY RESPONSIBILITY FOR COMMUNICATION / PRESCRIPTION RENEWALS   I agree that all controlled substance medications that I take will be prescribed only by my provider. If another healthcare provider prescribes me medication in an emergency, I will notify my provider within seventy-two (72) hours.  I will arrange for refills at the prescribed interval ONLY during regular office hours. I will not ask for refills earlier than agreed, after-hours, on holidays or weekends. Refills may take up to 72 hours for processing and prescriptions to reach the pharmacy.  I will inform my other health care providers that I am taking these medications and of the existence of this [id number]. In the event of an emergency, I will provide the same information to the emergency department prescribers.  I will keep my provider updated on the pharmacy I am using for controlled medication prescription filling. Initials:_______  4. MY RESPONSIBILITY FOR PROTECTING MEDICATIONS   I will protect my prescriptions and medications. I understand that lost or misplaced prescriptions will not be replaced.  I will keep medications only for my own use and will not share them with others. I will keep all medications away from children.  I agree that if my medications are adjusted or discontinued, I will properly dispose of any remaining medications. I understand that I will be required to dispose of any remaining controlled medications as, directed by my prescriber, prior to being provided with any prescriptions for other controlled medications.   Medication drop box locations can be found at: [employer]    5. MY RESPONSIBILITY WITH ILLEGAL DRUGS    I will not use illegal or street drugs or another person's prescription medications not prescribed to me.  If there are identified addiction type symptoms, then referral to a program may be provided by my provider and I agree to follow through with this recommendation. 6. MY RESPONSIBILITY FOR COOPERATION WITH INVESTIGATIONS   I understand that my provider will comply with any applicable law and may discuss my use and/or possible misuse/abuse of controlled substances and alcohol, as appropriate, with any health care provider involved in my care, pharmacist, or legal authority.  I authorize my provider and pharmacy to cooperate fully with law enforcement agencies (as permitted by law) in the investigation of any possible misuse, sale, or other diversion of my controlled substances.  I agree to waive any applicable privilege or right of privacy or confidentiality with respect to these authorizations. 7. PROVIDERS RIGHT TO MONITOR FOR SAFETY: PRESCRIPTION MONITORING / DRUG TESTING   I consent to drug/toxicology screening and will submit to a drug screen upon my providers request to assure I am only taking the prescribed drugs for my safety monitoring. I understand that a drug screen is a laboratory test in which a sample of my urine, blood or saliva is checked to see what drugs I have been taking. This may entail an observed urine specimen, which means that a nurse or other health care provider may watch me provide urine, and I will cooperate if I am asked to provide an observed specimen.  I understand that my provider will check a copy of my State Prescription Monitoring Program () Report in order to safely prescribe medications.  Pill Counts: I consent to pill counts when requested.   I may be asked to bring all my prescribed controlled substance medications, in their original bottles, to all of my scheduled appointments. In addition, my provider may ask me to come to the practice at any time for a random pill count. 8. TERMINATION OF THIS AGREEMENT  For my safety, my prescriber has the right to stop prescribing controlled substance medications and may end this agreement. Initials:_______   Conditions that may result in termination of this agreement:  a. I do not show any improvement in pain, or my activity has not improved. b. I develop rapid tolerance or loss of improvement, as described in my treatment plan.  c. I develop significant side effects from the medication. d. My behavior is not consistent with the responsibilities outlined above, thereby causing safety concerns to continue prescribing controlled substance medications. e. I fail to follow the terms of this agreement. f. Other:____________________________       UNDERSTANDING THIS MEDICATION AGREEMENT:    I have read the above and have had all my questions answered. For chronic disease management, I know that my symptoms can be managed with many types of treatments. A chronic medication trial may be part of my treatment, but I must be an active participant in my care. Medication therapy is only one part of my symptom management plan. In some cases, there may be limited scientific evidence to support the chronic use of certain medications to improve symptoms and daily function. Furthermore, in certain circumstances, there may be scientific information that suggests that the use of chronic controlled substances may worsen my symptoms and increase my risk of unintentional death directly related to this medication therapy. I know that if my provider feels my risk from controlled medications is greater than my benefit, I will have my controlled substance medication(s) compassionately lowered or removed altogether.      I further agree to allow this office to contact my HIPAA contact if there are concerns about my safety and use of the controlled medications. I have agreed to use the prescribed controlled substance medications to me as instructed by my provider and as stated in this Medication Agreement. My initial on each page and my signature below shows that I have read each page and I have had the opportunity to ask questions with answers provided by my provider.     Patient Name (Printed): _____________________________________  Patient Signature:  ______________________   Date: _____________    Prescriber Name (Printed): ___________________________________  Prescriber Signature: _____________________  Date: _____________

## 2022-06-30 NOTE — PATIENT INSTRUCTIONS
Titrating basal insulin instructions    Insulin Type: Lantus    Starting Dose: 30 units    Goal A.M Fasting Blood Glucose: 120    Instructions:    1) begin with 30 units  units every night    2) monitor fasting a.m. blood glucose levels for 2 to 3 days    3) if fasting a.m. blood glucose levels remain above 120 , then increase nightly  dose of Lantus insulin by 2 units    4) monitor fasting a.m. blood glucose levels for 2 to 3 days and repeat step 3 as needed until a.m. fasting blood glucose levels have reached goal      Please call [phone] (Option #3) or send a Hello Curry message if you have any questions about your diabetes medications, blood glucose readings or administration. Start Trulicity weekly injections. Start Prozac 60mg daily.

## 2022-06-30 NOTE — TELEPHONE ENCOUNTER
5 syringes are fine for Lantus. Fluoxetine 40mg is to be taken daily with one 20mg tablet of FLuoxetine to total 60mg daily dose.  30 day supply with 2 refills please

## 2022-07-01 PROBLEM — G47.00 INSOMNIA: Status: ACTIVE | Noted: 2022-07-01

## 2022-07-01 ASSESSMENT — ENCOUNTER SYMPTOMS
SHORTNESS OF BREATH: 0
DIARRHEA: 0
COLOR CHANGE: 0
VOMITING: 0
CONSTIPATION: 0
ABDOMINAL PAIN: 0
NAUSEA: 0
BLOOD IN STOOL: 0

## 2022-07-06 ENCOUNTER — HOSPITAL ENCOUNTER (EMERGENCY)
Age: 42
Discharge: PSYCHIATRIC OR SUBSTANCE USE FACILITY | End: 2022-07-06
Attending: EMERGENCY MEDICINE
Payer: COMMERCIAL

## 2022-07-06 VITALS
OXYGEN SATURATION: 96 % | HEART RATE: 103 BPM | WEIGHT: 190 LBS | TEMPERATURE: 97.9 F | BODY MASS INDEX: 25.73 KG/M2 | RESPIRATION RATE: 22 BRPM | HEIGHT: 72 IN | DIASTOLIC BLOOD PRESSURE: 53 MMHG | SYSTOLIC BLOOD PRESSURE: 119 MMHG

## 2022-07-06 DIAGNOSIS — R45.851 SUICIDAL IDEATION: ICD-10-CM

## 2022-07-06 DIAGNOSIS — F10.920 ACUTE ALCOHOLIC INTOXICATION WITHOUT COMPLICATION (HCC): Primary | ICD-10-CM

## 2022-07-06 LAB
ACETAMINOPHEN LEVEL: <5 UG/ML (ref 15–30)
ALBUMIN SERPL-MCNC: 3.8 GM/DL (ref 3.4–5)
ALCOHOL SCREEN SERUM: 0.04 %WT/VOL
ALCOHOL SCREEN SERUM: 0.24 %WT/VOL
ALP BLD-CCNC: 140 IU/L (ref 40–128)
ALT SERPL-CCNC: 32 U/L (ref 10–40)
AMPHETAMINES: NEGATIVE
ANION GAP SERPL CALCULATED.3IONS-SCNC: 10 MMOL/L (ref 4–16)
AST SERPL-CCNC: 27 IU/L (ref 15–37)
BACTERIA: NEGATIVE /HPF
BARBITURATE SCREEN URINE: NEGATIVE
BASOPHILS ABSOLUTE: 0.1 K/CU MM
BASOPHILS RELATIVE PERCENT: 0.6 % (ref 0–1)
BENZODIAZEPINE SCREEN, URINE: NEGATIVE
BILIRUB SERPL-MCNC: 0.3 MG/DL (ref 0–1)
BILIRUBIN URINE: NEGATIVE MG/DL
BLOOD, URINE: ABNORMAL
BUN BLDV-MCNC: 7 MG/DL (ref 6–23)
CALCIUM SERPL-MCNC: 9 MG/DL (ref 8.3–10.6)
CANNABINOID SCREEN URINE: NEGATIVE
CHLORIDE BLD-SCNC: 96 MMOL/L (ref 99–110)
CLARITY: CLEAR
CO2: 20 MMOL/L (ref 21–32)
COCAINE METABOLITE: NEGATIVE
COLOR: YELLOW
CREAT SERPL-MCNC: 0.9 MG/DL (ref 0.9–1.3)
DIFFERENTIAL TYPE: ABNORMAL
DOSE AMOUNT: ABNORMAL
DOSE AMOUNT: ABNORMAL
DOSE TIME: ABNORMAL
DOSE TIME: ABNORMAL
EOSINOPHILS ABSOLUTE: 0.3 K/CU MM
EOSINOPHILS RELATIVE PERCENT: 3.2 % (ref 0–3)
GFR AFRICAN AMERICAN: >60 ML/MIN/1.73M2
GFR NON-AFRICAN AMERICAN: >60 ML/MIN/1.73M2
GLUCOSE BLD-MCNC: 206 MG/DL (ref 70–99)
GLUCOSE BLD-MCNC: 251 MG/DL (ref 70–99)
GLUCOSE, URINE: 100 MG/DL
HCT VFR BLD CALC: 38 % (ref 42–52)
HEMOGLOBIN: 12.6 GM/DL (ref 13.5–18)
IMMATURE NEUTROPHIL %: 0.6 % (ref 0–0.43)
KETONES, URINE: NEGATIVE MG/DL
LEUKOCYTE ESTERASE, URINE: NEGATIVE
LYMPHOCYTES ABSOLUTE: 2.6 K/CU MM
LYMPHOCYTES RELATIVE PERCENT: 33.3 % (ref 24–44)
MAGNESIUM: 1.9 MG/DL (ref 1.8–2.4)
MCH RBC QN AUTO: 29.2 PG (ref 27–31)
MCHC RBC AUTO-ENTMCNC: 33.2 % (ref 32–36)
MCV RBC AUTO: 88.2 FL (ref 78–100)
MONOCYTES ABSOLUTE: 0.6 K/CU MM
MONOCYTES RELATIVE PERCENT: 7.9 % (ref 0–4)
NITRITE URINE, QUANTITATIVE: NEGATIVE
NUCLEATED RBC %: 0 %
OPIATES, URINE: NEGATIVE
OXYCODONE: NEGATIVE
PDW BLD-RTO: 13.2 % (ref 11.7–14.9)
PH, URINE: 5.5 (ref 5–8)
PHENCYCLIDINE, URINE: NEGATIVE
PLATELET # BLD: 418 K/CU MM (ref 140–440)
PMV BLD AUTO: 9 FL (ref 7.5–11.1)
POTASSIUM SERPL-SCNC: 3.3 MMOL/L (ref 3.5–5.1)
PROTEIN UA: 100 MG/DL
RBC # BLD: 4.31 M/CU MM (ref 4.6–6.2)
RBC URINE: <1 /HPF (ref 0–3)
SALICYLATE LEVEL: <0.3 MG/DL (ref 15–30)
SARS-COV-2, NAAT: NOT DETECTED
SEGMENTED NEUTROPHILS ABSOLUTE COUNT: 4.3 K/CU MM
SEGMENTED NEUTROPHILS RELATIVE PERCENT: 54.4 % (ref 36–66)
SODIUM BLD-SCNC: 126 MMOL/L (ref 135–145)
SOURCE: NORMAL
SPECIFIC GRAVITY UA: <1.005 (ref 1–1.03)
TOTAL IMMATURE NEUTOROPHIL: 0.05 K/CU MM
TOTAL NUCLEATED RBC: 0 K/CU MM
TOTAL PROTEIN: 8.4 GM/DL (ref 6.4–8.2)
TRICHOMONAS: ABNORMAL /HPF
UROBILINOGEN, URINE: 0.2 MG/DL (ref 0.2–1)
WBC # BLD: 7.8 K/CU MM (ref 4–10.5)
WBC UA: <1 /HPF (ref 0–2)

## 2022-07-06 PROCEDURE — 87635 SARS-COV-2 COVID-19 AMP PRB: CPT

## 2022-07-06 PROCEDURE — G0480 DRUG TEST DEF 1-7 CLASSES: HCPCS

## 2022-07-06 PROCEDURE — 83735 ASSAY OF MAGNESIUM: CPT

## 2022-07-06 PROCEDURE — 81001 URINALYSIS AUTO W/SCOPE: CPT

## 2022-07-06 PROCEDURE — 6370000000 HC RX 637 (ALT 250 FOR IP): Performed by: EMERGENCY MEDICINE

## 2022-07-06 PROCEDURE — 80307 DRUG TEST PRSMV CHEM ANLYZR: CPT

## 2022-07-06 PROCEDURE — 80053 COMPREHEN METABOLIC PANEL: CPT

## 2022-07-06 PROCEDURE — 36415 COLL VENOUS BLD VENIPUNCTURE: CPT

## 2022-07-06 PROCEDURE — 80061 LIPID PANEL: CPT

## 2022-07-06 PROCEDURE — 99285 EMERGENCY DEPT VISIT HI MDM: CPT

## 2022-07-06 PROCEDURE — 85025 COMPLETE CBC W/AUTO DIFF WBC: CPT

## 2022-07-06 PROCEDURE — 82962 GLUCOSE BLOOD TEST: CPT

## 2022-07-06 RX ORDER — LORAZEPAM 2 MG/ML
2 INJECTION INTRAMUSCULAR ONCE
Status: DISCONTINUED | OUTPATIENT
Start: 2022-07-06 | End: 2022-07-06 | Stop reason: HOSPADM

## 2022-07-06 RX ORDER — ZIPRASIDONE MESYLATE 20 MG/ML
20 INJECTION, POWDER, LYOPHILIZED, FOR SOLUTION INTRAMUSCULAR ONCE
Status: DISCONTINUED | OUTPATIENT
Start: 2022-07-06 | End: 2022-07-06 | Stop reason: HOSPADM

## 2022-07-06 RX ORDER — DIPHENHYDRAMINE HYDROCHLORIDE 50 MG/ML
25 INJECTION INTRAMUSCULAR; INTRAVENOUS ONCE
Status: DISCONTINUED | OUTPATIENT
Start: 2022-07-06 | End: 2022-07-06 | Stop reason: HOSPADM

## 2022-07-06 RX ORDER — LORAZEPAM 1 MG/1
1 TABLET ORAL ONCE
Status: COMPLETED | OUTPATIENT
Start: 2022-07-06 | End: 2022-07-06

## 2022-07-06 RX ADMIN — LORAZEPAM 1 MG: 1 TABLET ORAL at 08:25

## 2022-07-06 RX ADMIN — POTASSIUM BICARBONATE 40 MEQ: 782 TABLET, EFFERVESCENT ORAL at 02:58

## 2022-07-06 ASSESSMENT — ENCOUNTER SYMPTOMS
EYE PAIN: 0
SHORTNESS OF BREATH: 0
SORE THROAT: 0
NAUSEA: 0
RHINORRHEA: 0
COUGH: 0
EYE DISCHARGE: 0
BACK PAIN: 0
ABDOMINAL PAIN: 0
VOMITING: 0

## 2022-07-06 ASSESSMENT — PAIN - FUNCTIONAL ASSESSMENT: PAIN_FUNCTIONAL_ASSESSMENT: NONE - DENIES PAIN

## 2022-07-06 NOTE — ED NOTES
Pt has dressing on left foot and has made many attempts at picking at it saying he will just \"bleed out\" through it. Pt told multiple times to quit picking at dressing.       Oanh Pretty RN  07/06/22 6892

## 2022-07-06 NOTE — ED NOTES
Pt vomiting at bedside at this time. Pt states he doesn't need anything momentarily.       Bob Alegria  07/06/22 0882

## 2022-07-06 NOTE — ED NOTES
Pt asked to use restroom, when pt stood up he began to stumble.      Davonte Hinojosa  07/06/22 0840

## 2022-07-06 NOTE — ED NOTES
Pt attempting to scratch self to death. States that he will bleed himself to death.       Pavel Gil RN  07/06/22 0095

## 2022-07-06 NOTE — ED NOTES
Checked pt glucose as pt states \" I have not been taking my blood sugar medicine for five days at least.\" Pt glucose was 206.      Vish Whyte  07/06/22 0129

## 2022-07-06 NOTE — ED NOTES
EMTALA:    Accepting Facility: [facility]    Accepting provider: Dr. Fatoumata Ruffin: [phone] option#4     HAKEEM Valdez  07/06/22 1068

## 2022-07-06 NOTE — ED NOTES
Chief Complaint:    SI      Provisional Diagnosis:   Depression with SI  Acute alcoholic intoxication without complication  Anxiety      Risk, Psychosocial and Contextual Factors: (homeless, lack of social support etc.):   No support system, grieving loss of his mom    Current MH Treatment: Denies any current or past.        Present Suicidal Behavior:    Verbal: \"I just want to be with my mom\"    Attempt: No actual attempt but states he would cut himself      Access to Weapons: Denies access to guns but has access to knives      C-SSRS Current Suicide Risk: Low, Moderate or High:      High Risk    Past Suicidal Behavior:    Verbal: Patient states he has had SI for 3 years off and on. Attempts: No actual attempt      Self-Injurious/Self-Mutilation: Patient states he cuts himself and scratches his skin. Traumatic Event Within Past 2 Weeks: Patient states he has steadily become more depressed since his mom passed 2 months ago. Current Abuse:  Denies      Legal: Denies      Violence: Denies      Protective Factors:  Patient chose to call for help. Housing: Lives alone in own home. Clinical Summary:  Ivan was brought by EMS after calling and stating he was going to kill himself. He said he had a knive to his neck during the call. Patient admits to [facility], stating he just wants to be with his mom, who is . Patient denies HI. Patient states he has AVH. He hears voices telling him to harm himself and sometimes sees his mom in the mirror when looking at himself. Patient denies drug use except for marijuana. Patient states he drinks alcohol - 10-12 beers daily. Patient states he has great difficulty sleeping and gets about 3 hours of sleep nightly. Appetite is poor. Patient cooperative with assessment. Patient states, \"The depression is taking over. \"  Discussed referral for in patient admission to psychiatric hospital. Patient in agreement for need for help, stating,\" I'd kill myself if I went home now. \"      Level of Care Disposition:      Consulted with medical provider. Patient is medically stabilized. Consulted with patients RN about abnormalities or medical concerns. No abnormalities or medical concerns noted.   Consulted with_Dr. Laron Julio, attending__Patient to be._referred for further evaluation and treatment @ a psychiatric hospital._         HAKEEM Cantor  07/06/22 4188

## 2022-07-06 NOTE — ED PROVIDER NOTES
[address] ENCOUNTER      Pt Name: Sherrell Ramirez  MRN: [id number]  [place] 1980  Date of evaluation: 2022  Provider: Zoial Rodriguez MD    CHIEF COMPLAINT       Chief Complaint   Patient presents with    Suicidal     states that he has wanted to die for a couple of months since his mom ; states that he called 46 with a knife to his throat         HISTORY OF PRESENT ILLNESS      Sherrell Ramirez is a 39 y.o. male who presents to the emergency department  for   Chief Complaint   Patient presents with    Suicidal     states that he has wanted to die for a couple of months since his mom ; states that he called 46 with a knife to his throat       39 yom with a history of diabetes, recent amputation presents reporting suicidal thoughts with plan. He reports that he \"took a machete to his throat\" earlier today. He did not actually gauge any active self-harm. He states he does not want a live. He endorses a number of stressors including fairly recent death of his mother. He longer has any living parents. He presents to the emergency department by EMS. Did present on a pink slip. He was agitated upon presentation. Was able to be verbally redirected. He continues to endorse thoughts of not wanting to live. He is not have any external signs of trauma. Moving extremity spontaneously. Nursing Notes, Triage Notes & Vital Signs were reviewed. REVIEW OF SYSTEMS    (2-9 systems for level 4, 10 or more for level 5)     Review of Systems   Constitutional: Negative for chills and fever. HENT: Negative for congestion, rhinorrhea and sore throat. Eyes: Negative for pain and discharge. Respiratory: Negative for cough and shortness of breath. Cardiovascular: Negative for chest pain and palpitations. Gastrointestinal: Negative for abdominal pain, nausea and vomiting.    Endocrine: Negative for polydipsia and polyuria. Genitourinary: Negative for dysuria and flank pain. Musculoskeletal: Negative for back pain. Skin: Positive for wound. Negative for pallor and rash. Neurological: Negative for dizziness, facial asymmetry, light-headedness, numbness and headaches. Psychiatric/Behavioral: Positive for suicidal ideas. Negative for confusion. Except as noted above the remainder of the review of systems was reviewed and negative. PAST MEDICAL HISTORY     Past Medical History:   Diagnosis Date    Abscess of left foot 4/22/2022    Anemia associated with acute blood loss 4/26/2022    Callus of foot     Right foot - see's Dr. Radha Whelan Cellulitis of left foot 4/22/2022    Diabetic ulcer of left midfoot associated with type 2 diabetes mellitus, with muscle involvement without evidence of necrosis ([address] 4/26/2022    Diabetic ulcer of left midfoot associated with type 2 diabetes mellitus, with necrosis of muscle ([address] 6/7/2021    Diabetic ulcer of right midfoot associated with type 2 diabetes mellitus, with fat layer exposed ([address] 8/11/2020    Dizziness     positional    Essential hypertension     Follows with PCP    Hyperlipidemia     Lumbar radiculopathy     Septic embolism ([address] 6/13/2020    Subacute osteomyelitis of left foot ([address] 4/22/2022       Prior to Admission medications    Medication Sig Start Date End Date Taking?  Authorizing Provider   Dulaglutide 0.75 MG/0.5ML SOPN Inject 0.75 mg into the skin once a week 6/30/22   RAFAEL Martinez - CNP   hydrOXYzine HCl (ATARAX) 25 MG tablet Take 1 tablet by mouth nightly 6/30/22 7/30/22  RAFAEL Martinez CNP   FLUoxetine (PROZAC) 40 MG capsule Take 1 capsule by mouth daily 6/30/22 7/30/22  Andrés Cole MD   FLUoxetine [facility]) 20 MG capsule Take 1 capsule by mouth daily 6/30/22   Andrés Cole MD   insulin glargine (LANTUS SOLOSTAR) 100 UNIT/ML injection pen Inject 30 Units into the skin nightly 6/30/22 9/28/22  Lafayette Boeck, MD   collagenase Laila Garcia) 250 UNIT/GM ointment Per instructions DAILY (route: topical) 5/23/22   Historical Provider, MD   glyBURIDE (DIABETA) 5 MG tablet Take 10 mg by mouth in the morning and at bedtime    Historical Provider, MD   Alcohol Swabs PADS  4/27/22   Historical Provider, MD   metFORMIN (GLUCOPHAGE) 500 MG tablet Take 2 tablets by mouth 2 times daily (with meals) Indications: Start tomorrow 03/14 4/27/22 10/24/22  Martin Sutherland MD   pioglitazone (ACTOS) 30 MG tablet Take 1 tablet by mouth daily 4/27/22 7/26/22  Martin Sutherland MD   atorvastatin (LIPITOR) 40 MG tablet Take 1 tablet by mouth nightly 4/27/22 7/26/22  Martin Sutherland MD   losartan (COZAAR) 25 MG tablet Take 1 tablet by mouth daily 4/27/22   Martin Sutherland MD   metoprolol succinate (TOPROL XL) 25 MG extended release tablet Take 1 tablet by mouth daily 4/27/22   Martin Sutherland MD   blood glucose monitor strips Test 2 times a day & as needed for symptoms of irregular blood glucose. Dispense sufficient amount for indicated testing frequency plus additional to accommodate PRN testing needs. 4/27/22   Martin Sutherland MD   omeprazole (PRILOSEC) 20 MG delayed release capsule Take 1 capsule by mouth once daily in the morning 4/27/22   Martin Sutherland MD   blood glucose monitor kit and supplies Dispense sufficient amount for indicated testing frequency plus additional to accommodate PRN testing needs. Dispense all needed supplies to include: monitor, strips, lancing device, lancets, control solutions, alcohol swabs.  6/15/20   Jose Rodriguez MD   FreeStyle Lancets MISC 1 each by Does not apply route daily 6/15/20   Jose Rodriguez MD   acetaminophen (TYLENOL) 325 MG tablet Take 2 tablets by mouth every 4 hours as needed for Pain or Fever 2/28/18   Deja Das MD        Patient Active Problem List   Diagnosis    [facility] ang[facility]    Lumbar back pain with radiculopathy affecting left lower extremity    S/P lumbar laminectomy    Type 2 diabetes mellitus with diabetic neuropathy, without long-term current use of insulin (HCC)    Essential hypertension    Gastroesophageal reflux disease without esophagitis    Chest pain    Moderate nonproliferative diabetic retinopathy of both eyes without macular edema associated with type 2 diabetes mellitus (HCC)    Acute osteomyelitis (HCC)    Acute osteomyelitis of foot (HCC)    Abscess of left foot    Cellulitis of left foot    Subacute osteomyelitis of left foot (HCC)    Anemia associated with acute blood loss    Anxiety attack    WD-Diabetic ulcer of left midfoot associated with type 2 diabetes mellitus, with fat layer exposed ([address]    WD-Diabetic ulcer of right midfoot associated with type 2 diabetes mellitus, with fat layer exposed ([address]    WD-Partial nontraumatic amputation of foot, left ([address]    Severe episode of recurrent major depressive disorder, without psychotic features ([address]    Insomnia         SURGICAL HISTORY       Past Surgical History:   Procedure Laterality Date    ACHILLES TENDON SURGERY Right 1/8/2021    RIGHT ACHILLES TENDON LENGTHENING REPAIR performed by Lisa Vance DPM at [address] [place] [address]  03/2018    [address] [place] [address] 51    DENTAL SURGERY      teeth extractions -half per patient    HERNIA REPAIR Bilateral 5/27/2020    BIALTERAL HERNIA INGUINAL REPAIR performed by Rahul Melissa MD at [address] [place] [address]  2018    CO OFFICE/OUTPT VISIT,PROCEDURE ONLY Left 4/17/2018    L5-S1 HEMILAMINECTOMY, REMOVAL OF DISC LEFT SIDE performed by Emily Fonseca MD at [facility] Right 1/8/2021    RIGHT TRANSMETATARSAL TOE AMPUTATION performed by Lisa Vance DPM at [address] Left 4/23/2022    LEFT RAY GREAT TOE AMPUTATION performed by Georgie Martinez MD at [address] [place] [address]       Previous Medications ACETAMINOPHEN (TYLENOL) 325 MG TABLET    Take 2 tablets by mouth every 4 hours as needed for Pain or Fever    ALCOHOL SWABS PADS        ATORVASTATIN (LIPITOR) 40 MG TABLET    Take 1 tablet by mouth nightly    BLOOD GLUCOSE MONITOR KIT AND SUPPLIES    Dispense sufficient amount for indicated testing frequency plus additional to accommodate PRN testing needs. Dispense all needed supplies to include: monitor, strips, lancing device, lancets, control solutions, alcohol swabs. BLOOD GLUCOSE MONITOR STRIPS    Test 2 times a day & as needed for symptoms of irregular blood glucose. Dispense sufficient amount for indicated testing frequency plus additional to accommodate PRN testing needs. COLLAGENASE (SANTYL) 250 UNIT/GM OINTMENT    Per instructions DAILY (route: topical)    DULAGLUTIDE 0.75 MG/0.5ML SOPN    Inject 0.75 mg into the skin once a week    FLUOXETINE (PROZAC) 20 MG CAPSULE    Take 1 capsule by mouth daily    FLUOXETINE (PROZAC) 40 MG CAPSULE    Take 1 capsule by mouth daily    FREESTYLE LANCETS MISC    1 each by Does not apply route daily    GLYBURIDE (DIABETA) 5 MG TABLET    Take 10 mg by mouth in the morning and at bedtime    HYDROXYZINE HCL (ATARAX) 25 MG TABLET    Take 1 tablet by mouth nightly    INSULIN GLARGINE (LANTUS SOLOSTAR) 100 UNIT/ML INJECTION PEN    Inject 30 Units into the skin nightly    LOSARTAN (COZAAR) 25 MG TABLET    Take 1 tablet by mouth daily    METFORMIN (GLUCOPHAGE) 500 MG TABLET    Take 2 tablets by mouth 2 times daily (with meals) Indications: Start tomorrow 03/14    METOPROLOL SUCCINATE (TOPROL XL) 25 MG EXTENDED RELEASE TABLET    Take 1 tablet by mouth daily    OMEPRAZOLE (PRILOSEC) 20 MG DELAYED RELEASE CAPSULE    Take 1 capsule by mouth once daily in the morning    PIOGLITAZONE (ACTOS) 30 MG TABLET    Take 1 tablet by mouth daily       ALLERGIES     Patient has no known allergies.     FAMILY HISTORY       Family History   Problem Relation Age of Onset    Heart Disease in the Last Year: Not on file    Unstable Housing in the Last Year: Not on file       SCREENINGS    [place] Coma Scale  Eye Opening: Spontaneous  Best Verbal Response: Oriented  Best Motor Response: Obeys commands  Mikey Coma Scale Score: 15          PHYSICAL EXAM    (up to 7 for level 4, 8 or more for level 5)     ED Triage Vitals [07/06/22 0041]   BP Temp Temp Source Heart Rate Resp SpO2 Height Weight   (!) 203/95 98.7 °F (37.1 °C) Oral (!) 121 18 97 % 6' (1.829 m) 190 lb (86.2 kg)       Physical Exam  Constitutional:       Appearance: He is not diaphoretic. HENT:      Head: Normocephalic and atraumatic. Nose: No congestion or rhinorrhea. Mouth/Throat:      Mouth: Mucous membranes are moist.   Eyes:      General:         Right eye: No discharge. Left eye: No discharge. Extraocular Movements: Extraocular movements intact. Pupils: Pupils are equal, round, and reactive to light. Cardiovascular:      Rate and Rhythm: Tachycardia present. Heart sounds: No friction rub. No gallop. Pulmonary:      Effort: Pulmonary effort is normal.      Breath sounds: No stridor. Abdominal:      Palpations: Abdomen is soft. Tenderness: There is no abdominal tenderness. There is no guarding. Musculoskeletal:         General: No deformity. Normal range of motion. Cervical back: Normal range of motion and neck supple. No tenderness. Comments: Post-op wound on foot; no malodor   Lymphadenopathy:      Cervical: No cervical adenopathy. Skin:     General: Skin is warm. Capillary Refill: Capillary refill takes less than 2 seconds. Findings: No rash. Neurological:      Mental Status: He is alert and oriented to person, place, and time. Mental status is at baseline.          DIAGNOSTIC RESULTS     Labs Reviewed   URINALYSIS WITH MICROSCOPIC - Abnormal; Notable for the following components:       Result Value    Glucose, Urine 100 (*)     Blood, Urine SMALL NUMBER OR AMOUNT OBSERVED (*)     Protein,  (*)     All other components within normal limits   COMPREHENSIVE METABOLIC PANEL W/ REFLEX TO MG FOR LOW K - Abnormal; Notable for the following components:    Sodium 126 (*)     Potassium 3.3 (*)     Chloride 96 (*)     CO2 20 (*)     Glucose 251 (*)     Total Protein 8.4 (*)     Alkaline Phosphatase 140 (*)     All other components within normal limits   CBC WITH AUTO DIFFERENTIAL - Abnormal; Notable for the following components:    RBC 4.31 (*)     Hemoglobin 12.6 (*)     Hematocrit 38.0 (*)     Monocytes % 7.9 (*)     Eosinophils % 3.2 (*)     Immature Neutrophil % 0.6 (*)     All other components within normal limits   SALICYLATE LEVEL - Abnormal; Notable for the following components:    Salicylate Lvl <6.6 (*)     All other components within normal limits   ACETAMINOPHEN LEVEL - Abnormal; Notable for the following components:    Acetaminophen Level <5.0 (*)     All other components within normal limits   ETHANOL - Abnormal; Notable for the following components:    Alcohol Scrn 0.24 (*)     All other components within normal limits   POCT GLUCOSE - Abnormal; Notable for the following components:    POC Glucose 206 (*)     All other components within normal limits   COVID-19, RAPID   URINE DRUG SCREEN   MAGNESIUM          RADIOLOGY:     Non-plain film images such as CT, Ultrasound and MRI are read by the radiologist. Plain radiographic images are visualized and preliminarily interpreted by the emergency physician.        Interpretation per the Radiologist below, if available at the time of this note:    No orders to display         ED BEDSIDE ULTRASOUND:   Performed by ED Physician Sudheer Martinez MD       LABS:  Labs Reviewed   URINALYSIS WITH MICROSCOPIC - Abnormal; Notable for the following components:       Result Value    Glucose, Urine 100 (*)     Blood, Urine SMALL NUMBER OR AMOUNT OBSERVED (*)     Protein,  (*)     All other components within normal limits COMPREHENSIVE METABOLIC PANEL W/ REFLEX TO MG FOR LOW K - Abnormal; Notable for the following components:    Sodium 126 (*)     Potassium 3.3 (*)     Chloride 96 (*)     CO2 20 (*)     Glucose 251 (*)     Total Protein 8.4 (*)     Alkaline Phosphatase 140 (*)     All other components within normal limits   CBC WITH AUTO DIFFERENTIAL - Abnormal; Notable for the following components:    RBC 4.31 (*)     Hemoglobin 12.6 (*)     Hematocrit 38.0 (*)     Monocytes % 7.9 (*)     Eosinophils % 3.2 (*)     Immature Neutrophil % 0.6 (*)     All other components within normal limits   SALICYLATE LEVEL - Abnormal; Notable for the following components:    Salicylate Lvl <2.0 (*)     All other components within normal limits   ACETAMINOPHEN LEVEL - Abnormal; Notable for the following components:    Acetaminophen Level <5.0 (*)     All other components within normal limits   ETHANOL - Abnormal; Notable for the following components:    Alcohol Scrn 0.24 (*)     All other components within normal limits   POCT GLUCOSE - Abnormal; Notable for the following components:    POC Glucose 206 (*)     All other components within normal limits   COVID-19, RAPID   URINE DRUG SCREEN   MAGNESIUM       All other labs were within normal range or not returned as of this dictation. EMERGENCY DEPARTMENT COURSE and DIFFERENTIAL DIAGNOSIS/MDM:   Vitals:    Vitals:    07/06/22 0041   BP: (!) 203/95   Pulse: (!) 121   Resp: 18   Temp: 98.7 °F (37.1 °C)   TempSrc: Oral   SpO2: 97%   Weight: 190 lb (86.2 kg)   Height: 6' (1.829 m)           MDM  Number of Diagnoses or Management Options  Acute alcoholic intoxication without complication ([facility]  Suicidal ideation  Diagnosis management comments: 70-year-old male presents on [facility] for psychiatric evaluation. States that he took a machete to his knife earlier today with a plan to harm himself. He did not actually engage in any actions toward self-harm.   He does have a history of insulin-dependent diabetes. He has fairly recent undergone some toe amputations. His mother recently . He endorses not wanting to live anymore. He comes in the emergency department by EMS. Except have been written by police. In the emergency department, he continues to endorse wanting to not live anymore. He was a bit agitated upon presentation. He does present with elevated blood pressure and tachycardia. Vitals otherwise unremarkable. He is without extremity spontaneously. No significant external signs of trauma. We did obtain screening labs. He does have elevated blood glucose at about 251. His blood sodium is low but corrected it is about 1 20-1 30. He has no significant leukocytosis. Acetaminophen assess lites are unremarkable. His alcohol level is elevated 0.24. His potassium is also mildly low at 3.3. His potassium was replaced. Patient was very agitated emergency department but could not be verbally redirected. He will be observed in the emergency department and repeat alcohol level will be taken. When is normalized, he will require evaluation by mental crisis team for final disposition. His repeat alcohol level 4 medical clearance to be followed by oncoming physician. Amount and/or Complexity of Data Reviewed  Decide to obtain previous medical records or to obtain history from someone other than the patient: yes        -  Patient seen and evaluated in the emergency department. -  Triage and nursing notes reviewed and incorporated. -  Old chart records reviewed and incorporated. -  Work-up included:  See above  -  Results discussed with patient. CONSULTS:  None    PROCEDURES:  None performed unless otherwise noted below     Procedures        FINAL IMPRESSION      1. Acute alcoholic intoxication without complication ([facility]    2. Suicidal ideation          DISPOSITION/PLAN   DISPOSITION        PATIENT REFERRED TO:  No follow-up provider specified.     DISCHARGE MEDICATIONS:  New Prescriptions No medications on file       ED Provider Disposition Time  DISPOSITION        Appropriate personal protective equipment was worn during the patient's evaluation. These included surgical, eye protection, surgical mask or in 95 respirator and gloves. The patient was also placed in a surgical mask for the prevention of possible spread of respiratory viral illnesses. The Patient was instructed to read the package inserts with any medication that was prescribed. Major potential reactions and medication interactions were discussed. The Patient understands that there are numerous possible adverse reactions not covered. The patient was also instructed to arrange follow-up with his or her primary care provider for review of any pending labwork or incidental findings on any radiology results that were obtained. All efforts were made to discuss any incidental findings that require further monitoring. Controlled Substances Monitoring:     RX Monitoring 4/26/2018   Attestation The Prescription Monitoring Report for this patient was reviewed today. Periodic Controlled Substance Monitoring No signs of potential drug abuse or diversion identified.        (Please note that portions of this note were completed with a voice recognition program.  Efforts were made to edit the dictations but occasionally words are mis-transcribed.)    Molly Yousif MD (electronically signed)  Attending Emergency Physician           Molly Yousif MD  07/06/22 4008

## 2022-07-06 NOTE — ED NOTES
Pt acting out in behaviors for attention.  Pt acting as if he doesn't know where he is     Wendi Washington RN  07/06/22 2346

## 2022-07-06 NOTE — ED PROVIDER NOTES
Patient handed off to me by colleague Dr. Jona Foss pending placement to psychiatric facility. Patient has a 4:15 PM has been accepted to [facility] psychiatric facility under Dr. Nhan Abbott. ICD-10-CM    1. Acute alcoholic intoxication without complication (HCC)  D81.439    2.  Suicidal ideation  R45.851           Nika Brown, [place]  07/06/22 7329

## 2022-07-06 NOTE — ED NOTES
Pt stating that if he pulls off his wound to diabetic foot wound- he will die      Jonathan Terrazas RN  07/06/22 6660

## 2022-07-06 NOTE — ED NOTES
Patient hands cleaned of blood from scratching self and band-aid provided for wound by this EDT     Lonnie Watson  07/06/22 1118
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
## 2022-07-06 NOTE — ED NOTES
Labs drawn and pt changed into green gown by this EDT. Security collected belongings for pt.      Makenzie Child  07/06/22 5521

## 2022-07-06 NOTE — ED NOTES
Pt reports that he does not think that he can live by himself. Pt states that his mother passed away 2 months ago and he cannot deal with it. Pt states he has no one that cares about him. Pt states that his depression has taken control over his brain and he is not in control of it. Pt states that he will most likely kill himself when he is alone. Pt states that he has been drinking for the past 2 weeks 10-12 beers/day. Pt reports that he has had past SI attempts of picking at his skin and \"letting himself bleed\" and 1 month ago when he fell and his head hit a toilet and instead of getting seen hoped he would just die.       Katlin Vasquez RN  07/06/22 0134

## 2022-07-06 NOTE — ED NOTES
Informed patient of where he is going, [facility]. Patient voiced understanding and agreement.      HAKEEM Lozada  07/06/22 9654

## 2022-07-07 LAB
CHOLESTEROL: 138 MG/DL
HDLC SERPL-MCNC: 33 MG/DL
LDL CHOLESTEROL CALCULATED: 46 MG/DL
TRIGL SERPL-MCNC: 294 MG/DL

## 2022-07-09 ENCOUNTER — HOSPITAL ENCOUNTER (EMERGENCY)
Age: 42
Discharge: HOME | End: 2022-07-09
Payer: COMMERCIAL

## 2022-07-09 ENCOUNTER — APPOINTMENT (OUTPATIENT)
Dept: GENERAL RADIOLOGY | Age: 42
End: 2022-07-09
Payer: COMMERCIAL

## 2022-07-09 VITALS
RESPIRATION RATE: 20 BRPM | DIASTOLIC BLOOD PRESSURE: 88 MMHG | HEART RATE: 88 BPM | OXYGEN SATURATION: 100 % | BODY MASS INDEX: 24.38 KG/M2 | TEMPERATURE: 98.6 F | HEIGHT: 72 IN | WEIGHT: 180 LBS | SYSTOLIC BLOOD PRESSURE: 158 MMHG

## 2022-07-09 DIAGNOSIS — S98.112A AMPUTATION OF LEFT GREAT TOE (HCC): ICD-10-CM

## 2022-07-09 DIAGNOSIS — Z51.89 VISIT FOR WOUND CHECK: Primary | ICD-10-CM

## 2022-07-09 LAB
ALBUMIN SERPL-MCNC: 4.1 GM/DL (ref 3.4–5)
ALP BLD-CCNC: 122 IU/L (ref 40–129)
ALT SERPL-CCNC: 42 U/L (ref 10–40)
ANION GAP SERPL CALCULATED.3IONS-SCNC: 12 MMOL/L (ref 4–16)
AST SERPL-CCNC: 47 IU/L (ref 15–37)
BASOPHILS ABSOLUTE: 0 K/CU MM
BASOPHILS RELATIVE PERCENT: 0.2 % (ref 0–1)
BILIRUB SERPL-MCNC: 0.3 MG/DL (ref 0–1)
BUN BLDV-MCNC: 11 MG/DL (ref 6–23)
CALCIUM SERPL-MCNC: 9.1 MG/DL (ref 8.3–10.6)
CHLORIDE BLD-SCNC: 97 MMOL/L (ref 99–110)
CO2: 26 MMOL/L (ref 21–32)
CREAT SERPL-MCNC: 0.8 MG/DL (ref 0.9–1.3)
DIFFERENTIAL TYPE: ABNORMAL
EOSINOPHILS ABSOLUTE: 0.1 K/CU MM
EOSINOPHILS RELATIVE PERCENT: 1 % (ref 0–3)
GFR AFRICAN AMERICAN: >60 ML/MIN/1.73M2
GFR NON-AFRICAN AMERICAN: >60 ML/MIN/1.73M2
GLUCOSE BLD-MCNC: 74 MG/DL (ref 70–99)
HCT VFR BLD CALC: 40.3 % (ref 42–52)
HEMOGLOBIN: 13.3 GM/DL (ref 13.5–18)
IMMATURE NEUTROPHIL %: 0.3 % (ref 0–0.43)
LACTATE: 1.7 MMOL/L (ref 0.4–2)
LYMPHOCYTES ABSOLUTE: 1.7 K/CU MM
LYMPHOCYTES RELATIVE PERCENT: 16.7 % (ref 24–44)
MCH RBC QN AUTO: 29.8 PG (ref 27–31)
MCHC RBC AUTO-ENTMCNC: 33 % (ref 32–36)
MCV RBC AUTO: 90.4 FL (ref 78–100)
MONOCYTES ABSOLUTE: 0.7 K/CU MM
MONOCYTES RELATIVE PERCENT: 6.6 % (ref 0–4)
NUCLEATED RBC %: 0 %
PDW BLD-RTO: 13.2 % (ref 11.7–14.9)
PLATELET # BLD: 384 K/CU MM (ref 140–440)
PMV BLD AUTO: 8.8 FL (ref 7.5–11.1)
POTASSIUM SERPL-SCNC: 3.9 MMOL/L (ref 3.5–5.1)
RBC # BLD: 4.46 M/CU MM (ref 4.6–6.2)
SEGMENTED NEUTROPHILS ABSOLUTE COUNT: 7.5 K/CU MM
SEGMENTED NEUTROPHILS RELATIVE PERCENT: 75.2 % (ref 36–66)
SODIUM BLD-SCNC: 135 MMOL/L (ref 135–145)
TOTAL IMMATURE NEUTOROPHIL: 0.03 K/CU MM
TOTAL NUCLEATED RBC: 0 K/CU MM
TOTAL PROTEIN: 8.7 GM/DL (ref 6.4–8.2)
WBC # BLD: 10 K/CU MM (ref 4–10.5)

## 2022-07-09 PROCEDURE — 87070 CULTURE OTHR SPECIMN AEROBIC: CPT

## 2022-07-09 PROCEDURE — 99284 EMERGENCY DEPT VISIT MOD MDM: CPT

## 2022-07-09 PROCEDURE — 80053 COMPREHEN METABOLIC PANEL: CPT

## 2022-07-09 PROCEDURE — 83605 ASSAY OF LACTIC ACID: CPT

## 2022-07-09 PROCEDURE — 87077 CULTURE AEROBIC IDENTIFY: CPT

## 2022-07-09 PROCEDURE — 73630 X-RAY EXAM OF FOOT: CPT

## 2022-07-09 PROCEDURE — 87075 CULTR BACTERIA EXCEPT BLOOD: CPT

## 2022-07-09 PROCEDURE — 85025 COMPLETE CBC W/AUTO DIFF WBC: CPT

## 2022-07-09 PROCEDURE — 6370000000 HC RX 637 (ALT 250 FOR IP): Performed by: PHYSICIAN ASSISTANT

## 2022-07-09 RX ORDER — SULFAMETHOXAZOLE AND TRIMETHOPRIM 800; 160 MG/1; MG/1
1 TABLET ORAL 2 TIMES DAILY
Qty: 14 TABLET | Refills: 0 | Status: SHIPPED | OUTPATIENT
Start: 2022-07-09 | End: 2022-07-16

## 2022-07-09 RX ORDER — SULFAMETHOXAZOLE AND TRIMETHOPRIM 800; 160 MG/1; MG/1
1 TABLET ORAL ONCE
Status: COMPLETED | OUTPATIENT
Start: 2022-07-09 | End: 2022-07-09

## 2022-07-09 RX ADMIN — SULFAMETHOXAZOLE AND TRIMETHOPRIM 1 TABLET: 800; 160 TABLET ORAL at 14:29

## 2022-07-09 NOTE — ED PROVIDER NOTES
eMERGENCY dEPARTMENT eNCOUnter        [address] [facility] EMERGENCY DEPARTMENT    PCP: Gerry Ornelas MD    [address]    Chief Complaint   Patient presents with    Wound Infection     left foot wound post amputation 2 and a half months ago, denies fevers. arrives from [facility] for depression, was pink slipped on 7/6, denies SI/HI at this time         HPI    Alan Soto is a 39 y.o. male who presents for wound check. Consciousness patient is currently inpatient at [facility] services with pink slip in place since 7/6/2022. He is status post left great toe and first metatarsal head amputation with Dr. Savanna Munoz x2 months ago with residual open diabetic ulcer with exposure of fat layer. Patient states that he was last seen by general surgeon 2 weeks ago for wound debridement. As an additional round ulceration to the plantar aspect of the left foot also being managed by same surgeon. Patient states that today when staff at [facility] evaluated the wound, they were concerned as he was having a more foul smell and look more red and edematous around this margins. Patient denying any worsening pain in the left foot. Denying any redness or swelling moving proximally into the ankle or calf. Denying any fever chills chest pain shortness of breath nausea or vomiting. Denying any current antibiotic regimen. Patient is a type II diabetic, states he was started on insulin regimen which she has been compliant with.     REVIEW OF SYSTEMS    General: No fever or chills  Skin: SEe HPI  Musculoskeletal: No other joint pain    All other review of systems are negative  See HPI and nursing notes for additional information       PAST MEDICAL & SURGICAL HISTORY    Past Medical History:   Diagnosis Date    Abscess of left foot 4/22/2022    Anemia associated with acute blood loss 4/26/2022    Callus of foot     Right foot - see's Dr. Luis Enrique Mayo    Cellulitis of left foot 4/22/2022    Diabetic ulcer of left midfoot associated with type 2 diabetes mellitus, with muscle involvement without evidence of necrosis ([address] 4/26/2022    Diabetic ulcer of left midfoot associated with type 2 diabetes mellitus, with necrosis of muscle ([address] 6/7/2021    Diabetic ulcer of right midfoot associated with type 2 diabetes mellitus, with fat layer exposed ([address] 8/11/2020    Dizziness     positional    Essential hypertension     Follows with PCP    Hyperlipidemia     Lumbar radiculopathy     Septic embolism ([address] 6/13/2020    Subacute osteomyelitis of left foot ([address] 4/22/2022     Past Surgical History:   Procedure Laterality Date    ACHILLES TENDON SURGERY Right 1/8/2021    RIGHT ACHILLES TENDON LENGTHENING REPAIR performed by Binh Guerrero DPM at [facility]  03/2018    [address] [place] [address] 51    DENTAL SURGERY      teeth extractions -half per patient    HERNIA REPAIR Bilateral 5/27/2020    BIALTERAL HERNIA INGUINAL REPAIR performed by Eunice Velazquez MD at [address] [place] [address]  2018    [facility] OFFICE/OUTPT VISIT,PROCEDURE ONLY Left 4/17/2018    L5-S1 HEMILAMINECTOMY, REMOVAL OF DISC LEFT SIDE performed by Mer Rodriguez MD at [address] Right 1/8/2021    RIGHT TRANSMETATARSAL TOE AMPUTATION performed by Binh Guerrero DPM at [address] Left 4/23/2022    LEFT RAY GREAT TOE AMPUTATION performed by Jannie Morataya MD at [facility]         CURRENT MEDICATIONS    Current Outpatient Rx   Medication Sig Dispense Refill    sulfamethoxazole-trimethoprim (BACTRIM DS) 800-160 MG per tablet Take 1 tablet by mouth 2 times daily for 7 days 14 tablet 0    Dulaglutide 0.75 MG/0.5ML SOPN Inject 0.75 mg into the skin once a week 1 pen 3    hydrOXYzine HCl (ATARAX) 25 MG tablet Take 1 tablet by mouth nightly 30 tablet 2    FLUoxetine (PROZAC) 40 MG capsule Take 1 capsule by mouth daily 30 capsule 3    FLUoxetine (PROZAC) 20 MG capsule Take 1 capsule by mouth daily 30 capsule 3    insulin glargine (LANTUS SOLOSTAR) 100 UNIT/ML injection pen Inject 30 Units into the skin nightly 5 pen 2    collagenase (SANTYL) 250 UNIT/GM ointment Per instructions DAILY (route: topical)      glyBURIDE (DIABETA) 5 MG tablet Take 10 mg by mouth in the morning and at bedtime      Alcohol Swabs PADS       metFORMIN (GLUCOPHAGE) 500 MG tablet Take 2 tablets by mouth 2 times daily (with meals) Indications: Start tomorrow 03/14 360 tablet 1    pioglitazone (ACTOS) 30 MG tablet Take 1 tablet by mouth daily 90 tablet 1    atorvastatin (LIPITOR) 40 MG tablet Take 1 tablet by mouth nightly 90 tablet 1    losartan (COZAAR) 25 MG tablet Take 1 tablet by mouth daily 30 tablet 5    metoprolol succinate (TOPROL XL) 25 MG extended release tablet Take 1 tablet by mouth daily 90 tablet 1    blood glucose monitor strips Test 2 times a day & as needed for symptoms of irregular blood glucose. Dispense sufficient amount for indicated testing frequency plus additional to accommodate PRN testing needs. 100 strip 0    omeprazole (PRILOSEC) 20 MG delayed release capsule Take 1 capsule by mouth once daily in the morning 90 capsule 1    blood glucose monitor kit and supplies Dispense sufficient amount for indicated testing frequency plus additional to accommodate PRN testing needs. Dispense all needed supplies to include: monitor, strips, lancing device, lancets, control solutions, alcohol swabs.  1 kit 0    FreeStyle Lancets MISC 1 each by Does not apply route daily 100 each 3    acetaminophen (TYLENOL) 325 MG tablet Take 2 tablets by mouth every 4 hours as needed for Pain or Fever 120 tablet 3       ALLERGIES    No Known Allergies    SOCIAL & FAMILY HISTORY    Social History     Socioeconomic History    Marital status: Single     Spouse name: None    Number of children: None    Years of education: None  Highest education level: None   Occupational History    None   Tobacco Use    Smoking status: Never Smoker    Smokeless tobacco: Never Used   Vaping Use    Vaping Use: Never used   Substance and Sexual Activity    Alcohol use: Yes     Comment: \"couple beers a night\"    Drug use: No    Sexual activity: Yes     Partners: Female   Other Topics Concern    None   Social History Narrative    None     Social Determinants of Health     Financial Resource Strain: Low Risk     Difficulty of Paying Living Expenses: Not hard at all   Food Insecurity: No Food Insecurity    Worried About Running Out of Food in the Last Year: Never true    Margarita of Food in the Last Year: Never true   Transportation Needs:     Lack of Transportation (Medical): Not on file    Lack of Transportation (Non-Medical):  Not on file   Physical Activity:     Days of Exercise per Week: Not on file    Minutes of Exercise per Session: Not on file   Stress:     Feeling of Stress : Not on file   Social Connections:     Frequency of Communication with Friends and Family: Not on file    Frequency of Social Gatherings with Friends and Family: Not on file    Attends Protestant Services: Not on file    Active Member of [employer] or Organizations: Not on file    Attends Club or Organization Meetings: Not on file    Marital Status: Not on file   Intimate Partner Violence:     Fear of Current or Ex-Partner: Not on file    Emotionally Abused: Not on file    Physically Abused: Not on file    Sexually Abused: Not on file   Housing Stability:     Unable to Pay for Housing in the Last Year: Not on file    Number of [place] in the Last Year: Not on file    Unstable Housing in the Last Year: Not on file     Family History   Problem Relation Age of Onset    Heart Disease Father     Diabetes Father     Diabetes Mother     Heart Disease Mother     Other Mother     Kidney Disease Mother     Heart Attack Mother          PHYSICAL EXAM VITAL SIGNS: BP (!) 158/88   Pulse 88   Temp 98.6 °F (37 °C) (Oral)   Resp 20   Ht 6' (1.829 m)   Wt 180 lb (81.6 kg)   SpO2 100%   BMI 24.41 kg/m²   Constitutional:  Well developed, well nourished. Appears comfortable  HENT:  Atraumatic, normocephalic, PERRL, EOMIs, nasal bones midline, trachea midline  Cardiac: RRR normal S1/S2  Respiratory: Speaking full sentence out difficulty. Lungs clear to auscultation. No rales or rhonchi. No retractions, no respiratory distress  Abdomen: No gross discoloration or bruising. Abdomen soft nondistended nontender. Normoactive bowel sounds in all quadrants. Musculoskeletal:   The left foot demonstrates a large open postoperative wound with mildly erythematous edematous wound margins. Scattered areas of darker black/eschar to wound margins. No proximal streaking. Bright red healing central tissue with some areas of granulation. No purulent or bloody drainage from the site. No involvement of the remaining toes or lateral foot dorsum or ankle joint. Additional round nickel sized ulceration to the plantar foot over the fourth and fifth metatarsal head, clean wound margins without evidence of necrosis. There is surrounding thickened callus tissue without purulent drainage. Pedal pulses 2+. Full range of motion of left ankle with 5/5 strength in dorsi/plantar flexion against resistance. Achilles tendon is grossly intact without redness or warmth. Compartments are soft throughout the left lower extremity. Vascular:  DP pulse 2+, capillary refill intact. Integument: See HPI  Neurologic:  Awake alert, no slurred speech. No foot drop of the affected extremity. DTRs and distal sensation equal/intact. RADIOLOGY        XR FOOT LEFT (MIN 3 VIEWS) (Final result)  Result time 07/09/22 13:05:15  Final result by Lesly Campo MD (07/09/22 13:05:15)                Impression:    1.  Status post amputation of the 1st toe with sclerosis and lucency at the   base of the proximal 1st metatarsal.  These may just reflect postoperative   changes.  However, I cannot exclude the possibility of cellulitis and   osteomyelitis.  I would recommend MRI for further evaluation. Narrative:    EXAMINATION:   THREE XRAY VIEWS OF THE LEFT FOOT     7/9/2022 12:20 pm     COMPARISON:   04/18/2022. HISTORY:   ORDERING SYSTEM PROVIDED HISTORY: s/p great toe ampuation, increased edema   TECHNOLOGIST PROVIDED HISTORY:   Reason for exam:->s/p great toe ampuation, increased edema   Reason for Exam: s/p great toe ampuation, increased edema     FINDINGS:   There has been interval amputation of the the 1st toe.  There is increased   density involving the remaining 1st metatarsal which is expansile and   sclerotic.  There is lucency at the base of the 1st metatarsal.  There is   ovoid soft tissue gas involving the plantar aspect of the foot at the level   of the metatarsophalangeal joints.  There are degenerative changes involving   the midfoot.  There is generalized soft tissue swelling involving the left   foot.  There are calcaneal spurs at the insertion sites of the plantar fascia   and Achilles tendon. ED COURSE & MEDICAL DECISION MAKING       Vital signs and nursing notes reviewed during ED course. I have independently evaluated this patient . Supervising MD - Dr. Tali Lemons - present in the Emergency Department, available for consultation, throughout entirety of  patient care. All pertinent Lab data and radiographic results reviewed with patient at bedside. The patient and/or the family were informed of the results of any tests/labs/imaging, the treatment plan, and time was allotted to answer questions. Differential diagnosis includes but not limited to fracture, dislocation, vascular injury, neurologic injury. Clinical  IMPRESSION    1. Visit for wound check    2. Amputation of left great toe [facility])        Patient presents for wound check. On exam, pleasant nontoxic 49-year-old male, afebrile in no acute distress. Patient was placed into ED room 23 as he is still under pink slip hold from [facility] mental health services. Currently not endorsing any acute psychiatric complaints. Sitter is at bedside. Unremarkable cardiopulmonary exam.  Abdomen soft nontender. Left foot reveals a large open postoperative wound with slightly dark/eschared wound margins however central tissue is bright red with granulation tissue. No proximal streaking or purulent drainage. Additional round ulcerative wound to the plantar aspect of the foot without evidence of necrosis. Compartments otherwise soft throughout the left lower leg. No involvement of left ankle or calf. CBC shows normal white count, hemoglobin 13.3. CMP without significant derangement. Glucose is 74, normal CO2 and anion gap and electrolytes. Lactic is normal at 1.7. X-ray of left foot status post amputation of first toe with sclerosis and lucency at the base of the proximal first metatarsal which could reflect postoperative changes but cannot rule out cellulitis and osteomyelitis and would recommend MRI for further evaluation. There is an ovoid soft tissue gas involving the plantar aspect of the foot at the level of the metatarsophalangeal joints. At this time, given reassuring labs and physical exam without history of fever, do think patient would be appropriate for discharge home and outpatient follow-up however plan to consult with on-call general surgeon. I did consult with Dr. Judy Linares - general surgery - and discussed patient's history, ED presentation/course including any pertinent laboratory findings and imaging study findings. I was able to rely images of patient's left foot wounds today via perfect serve text messaging for general surgeon to evaluate. He does agree that patient is otherwise very well appearing/healing with clear central granulation tissue.   Does not feel patient requires admission for IV antibiotics but would recommend discharge back to [facility] with Bactrim antibiotic coverage pending wound culture. Also recommend the patient continue outpatient follow-up with Dr. Manuela Lloyd at wound care clinic     Plan discussed with patient verbalized understanding agreement. First dose of bactrim given in the ED. No signs of systemic infection, I have a low suspicion for bacteremia, necrotizing fasciitis, lymphangitis, TEN/SJS, underlying bony osteomyelitis. Patient is discharged in stable condition back to inpatient [facility] with a prescription for 1 week course of bactrim. Patient agrees to have wound check  in 48-72 hours, either with his PCP or with general surgeon/wound care clinic. Return warnings back to the ED are discussed for increasing signs of infection including fever/chills, spreading redness/warmth, abdominal pain/nausea/vomiting. Diagnosis and plan discussed in detail with patient who understands and agrees. Patient agrees to return emergency department if symptoms worsen or any new symptoms develop. Comment: Please note this report has been produced using speech recognition software and may contain errors related to that system including errors in grammar, punctuation, and spelling, as well as words and phrases that may be inappropriate. If there are any questions or concerns please feel free to contact the dictating provider for clarification.             Raegan Hernandez PA-C  07/09/22 7728

## 2022-07-14 LAB
CULTURE: ABNORMAL
CULTURE: ABNORMAL
Lab: ABNORMAL
SPECIMEN: ABNORMAL

## 2022-07-18 ENCOUNTER — TELEPHONE (OUTPATIENT)
Dept: WOUND CARE | Age: 42
End: 2022-07-18

## 2022-07-28 ENCOUNTER — TELEPHONE (OUTPATIENT)
Dept: FAMILY MEDICINE CLINIC | Age: 42
End: 2022-07-28

## 2022-07-28 ENCOUNTER — CARE COORDINATION (OUTPATIENT)
Dept: CARE COORDINATION | Age: 42
End: 2022-07-28

## 2022-07-28 ENCOUNTER — OFFICE VISIT (OUTPATIENT)
Dept: FAMILY MEDICINE CLINIC | Age: 42
End: 2022-07-28
Payer: COMMERCIAL

## 2022-07-28 VITALS
HEART RATE: 88 BPM | BODY MASS INDEX: 26.01 KG/M2 | SYSTOLIC BLOOD PRESSURE: 150 MMHG | DIASTOLIC BLOOD PRESSURE: 100 MMHG | OXYGEN SATURATION: 98 % | WEIGHT: 192 LBS | HEIGHT: 72 IN

## 2022-07-28 DIAGNOSIS — E11.40 TYPE 2 DIABETES MELLITUS WITH DIABETIC NEUROPATHY, WITHOUT LONG-TERM CURRENT USE OF INSULIN (HCC): ICD-10-CM

## 2022-07-28 DIAGNOSIS — Z13.9 ENCOUNTER FOR SCREENING INVOLVING SOCIAL DETERMINANTS OF HEALTH (SDOH): ICD-10-CM

## 2022-07-28 DIAGNOSIS — F33.2 SEVERE EPISODE OF RECURRENT MAJOR DEPRESSIVE DISORDER, WITHOUT PSYCHOTIC FEATURES (HCC): Primary | ICD-10-CM

## 2022-07-28 PROCEDURE — 2022F DILAT RTA XM EVC RTNOPTHY: CPT

## 2022-07-28 PROCEDURE — G8427 DOCREV CUR MEDS BY ELIG CLIN: HCPCS

## 2022-07-28 PROCEDURE — 1036F TOBACCO NON-USER: CPT

## 2022-07-28 PROCEDURE — 99214 OFFICE O/P EST MOD 30 MIN: CPT

## 2022-07-28 PROCEDURE — 3046F HEMOGLOBIN A1C LEVEL >9.0%: CPT

## 2022-07-28 PROCEDURE — G8417 CALC BMI ABV UP PARAM F/U: HCPCS

## 2022-07-28 RX ORDER — FLUOXETINE HYDROCHLORIDE 40 MG/1
80 CAPSULE ORAL DAILY
Qty: 30 CAPSULE | Refills: 3 | Status: SHIPPED | OUTPATIENT
Start: 2022-07-28 | End: 2022-09-26

## 2022-07-28 SDOH — HEALTH STABILITY: MENTAL HEALTH: HOW MANY STANDARD DRINKS CONTAINING ALCOHOL DO YOU HAVE ON A TYPICAL DAY?: 1 OR 2

## 2022-07-28 SDOH — SOCIAL STABILITY: SOCIAL NETWORK: IN A TYPICAL WEEK, HOW MANY TIMES DO YOU TALK ON THE PHONE WITH FAMILY, FRIENDS, OR NEIGHBORS?: ONCE A WEEK

## 2022-07-28 SDOH — ECONOMIC STABILITY: HOUSING INSECURITY
IN THE LAST 12 MONTHS, WAS THERE A TIME WHEN YOU DID NOT HAVE A STEADY PLACE TO SLEEP OR SLEPT IN A SHELTER (INCLUDING NOW)?: NO

## 2022-07-28 SDOH — ECONOMIC STABILITY: TRANSPORTATION INSECURITY
IN THE PAST 12 MONTHS, HAS LACK OF TRANSPORTATION KEPT YOU FROM MEETINGS, WORK, OR FROM GETTING THINGS NEEDED FOR DAILY LIVING?: NO

## 2022-07-28 SDOH — HEALTH STABILITY: PHYSICAL HEALTH: ON AVERAGE, HOW MANY DAYS PER WEEK DO YOU ENGAGE IN MODERATE TO STRENUOUS EXERCISE (LIKE A BRISK WALK)?: 0 DAYS

## 2022-07-28 SDOH — ECONOMIC STABILITY: INCOME INSECURITY: HOW HARD IS IT FOR YOU TO PAY FOR THE VERY BASICS LIKE FOOD, HOUSING, MEDICAL CARE, AND HEATING?: SOMEWHAT HARD

## 2022-07-28 SDOH — ECONOMIC STABILITY: HOUSING INSECURITY: IN THE LAST 12 MONTHS, HOW MANY PLACES HAVE YOU LIVED?: 1

## 2022-07-28 SDOH — SOCIAL STABILITY: SOCIAL NETWORK: ARE YOU MARRIED, WIDOWED, DIVORCED, SEPARATED, NEVER MARRIED, OR LIVING WITH A PARTNER?: NEVER MARRIED

## 2022-07-28 SDOH — ECONOMIC STABILITY: FOOD INSECURITY: WITHIN THE PAST 12 MONTHS, YOU WORRIED THAT YOUR FOOD WOULD RUN OUT BEFORE YOU GOT MONEY TO BUY MORE.: SOMETIMES TRUE

## 2022-07-28 SDOH — ECONOMIC STABILITY: FOOD INSECURITY: WITHIN THE PAST 12 MONTHS, THE FOOD YOU BOUGHT JUST DIDN'T LAST AND YOU DIDN'T HAVE MONEY TO GET MORE.: SOMETIMES TRUE

## 2022-07-28 SDOH — ECONOMIC STABILITY: TRANSPORTATION INSECURITY
IN THE PAST 12 MONTHS, HAS THE LACK OF TRANSPORTATION KEPT YOU FROM MEDICAL APPOINTMENTS OR FROM GETTING MEDICATIONS?: NO

## 2022-07-28 SDOH — HEALTH STABILITY: PHYSICAL HEALTH: ON AVERAGE, HOW MANY MINUTES DO YOU ENGAGE IN EXERCISE AT THIS LEVEL?: 0 MIN

## 2022-07-28 SDOH — HEALTH STABILITY: MENTAL HEALTH
STRESS IS WHEN SOMEONE FEELS TENSE, NERVOUS, ANXIOUS, OR CAN'T SLEEP AT NIGHT BECAUSE THEIR MIND IS TROUBLED. HOW STRESSED ARE YOU?: RATHER MUCH

## 2022-07-28 SDOH — ECONOMIC STABILITY: INCOME INSECURITY: IN THE LAST 12 MONTHS, WAS THERE A TIME WHEN YOU WERE NOT ABLE TO PAY THE MORTGAGE OR RENT ON TIME?: NO

## 2022-07-28 SDOH — SOCIAL STABILITY: SOCIAL NETWORK: HOW OFTEN DO YOU ATTEND CHURCH OR RELIGIOUS SERVICES?: NEVER

## 2022-07-28 SDOH — HEALTH STABILITY: MENTAL HEALTH: HOW OFTEN DO YOU HAVE A DRINK CONTAINING ALCOHOL?: 2-3 TIMES A WEEK

## 2022-07-28 SDOH — SOCIAL STABILITY: SOCIAL NETWORK: HOW OFTEN DO YOU ATTENT MEETINGS OF THE CLUB OR ORGANIZATION YOU BELONG TO?: NEVER

## 2022-07-28 ASSESSMENT — ENCOUNTER SYMPTOMS
NAUSEA: 0
BLOOD IN STOOL: 0
VOMITING: 0
ABDOMINAL PAIN: 0
COLOR CHANGE: 0
SHORTNESS OF BREATH: 0
DIARRHEA: 0
CONSTIPATION: 0

## 2022-07-28 ASSESSMENT — COLUMBIA-SUICIDE SEVERITY RATING SCALE - C-SSRS
4. HAVE YOU HAD THESE THOUGHTS AND HAD SOME INTENTION OF ACTING ON THEM?: NO
6. HAVE YOU EVER DONE ANYTHING, STARTED TO DO ANYTHING, OR PREPARED TO DO ANYTHING TO END YOUR LIFE?: YES
1. WITHIN THE PAST MONTH, HAVE YOU WISHED YOU WERE DEAD OR WISHED YOU COULD GO TO SLEEP AND NOT WAKE UP?: YES
2. HAVE YOU ACTUALLY HAD ANY THOUGHTS OF KILLING YOURSELF?: YES
5. HAVE YOU STARTED TO WORK OUT OR WORKED OUT THE DETAILS OF HOW TO KILL YOURSELF? DO YOU INTEND TO CARRY OUT THIS PLAN?: NO
3. HAVE YOU BEEN THINKING ABOUT HOW YOU MIGHT KILL YOURSELF?: NO
7. DID THIS OCCUR IN THE LAST THREE MONTHS: YES

## 2022-07-28 ASSESSMENT — PATIENT HEALTH QUESTIONNAIRE - PHQ9
7. TROUBLE CONCENTRATING ON THINGS, SUCH AS READING THE NEWSPAPER OR WATCHING TELEVISION: 3
SUM OF ALL RESPONSES TO PHQ QUESTIONS 1-9: 18
SUM OF ALL RESPONSES TO PHQ QUESTIONS 1-9: 21
10. IF YOU CHECKED OFF ANY PROBLEMS, HOW DIFFICULT HAVE THESE PROBLEMS MADE IT FOR YOU TO DO YOUR WORK, TAKE CARE OF THINGS AT HOME, OR GET ALONG WITH OTHER PEOPLE: 3
1. LITTLE INTEREST OR PLEASURE IN DOING THINGS: 0
9. THOUGHTS THAT YOU WOULD BE BETTER OFF DEAD, OR OF HURTING YOURSELF: 3
5. POOR APPETITE OR OVEREATING: 3
8. MOVING OR SPEAKING SO SLOWLY THAT OTHER PEOPLE COULD HAVE NOTICED. OR THE OPPOSITE, BEING SO FIGETY OR RESTLESS THAT YOU HAVE BEEN MOVING AROUND A LOT MORE THAN USUAL: 0
6. FEELING BAD ABOUT YOURSELF - OR THAT YOU ARE A FAILURE OR HAVE LET YOURSELF OR YOUR FAMILY DOWN: 3
3. TROUBLE FALLING OR STAYING ASLEEP: 3
SUM OF ALL RESPONSES TO PHQ QUESTIONS 1-9: 21
SUM OF ALL RESPONSES TO PHQ9 QUESTIONS 1 & 2: 3
2. FEELING DOWN, DEPRESSED OR HOPELESS: 3
SUM OF ALL RESPONSES TO PHQ QUESTIONS 1-9: 21
4. FEELING TIRED OR HAVING LITTLE ENERGY: 3

## 2022-07-28 NOTE — CARE COORDINATION
Ambulatory Care Coordination Note  7/28/2022    ACC: Beena Erickson, RN    Summary Note:  Spoke with patient for ACM follow up:  Provider referral for enrollment. Medications:  Medication reconciliation completed. Patient reports that he is taking his medications as directed. Denies any issue with the cost of his medications. DM:  Last A1C 10.4. Active A1C order from [facility] today. Most recent . Provider is aware from [address] Referral has been made for Diabetic Educator support. Patient reports that he is monitoring his BS 3 x day. Instructed on routine BS monitoring; encouraged patient to keep a log for Provider review. Instructed on low concentrated sugar/ low carb diet. Patient reports that he is struggling to follow his diet d/t depression. Patient notes recent suicide attempt. Reports that his mother passed away recently and he has concern that he will lose his home and his car. Patient denies thoughts of harming himself or others. Noted new order for referral to Psych. Encouraged patient follow through. Patient reports that he has fallen several times at home. Reports that his home is 3 stories and he has fallen on the stairs and outside trying to cut the grass. Patient is interested in assistance to obtain low income / handi-cap accessible apartment. Agreeable to referral for [facility] support. Discussed resources for support. Patient reports that he uses his w/c for ambulation. Reports that he has a car at present; but is concerned that it will be taken. Patient is able to prepare meals; but reports that he could use help around the house and with running errands. Agreeable to referral to [facility]. Patient denies any questions or further needs at this time. ACM contact information provided. Encouraged return call if needs arise. Plan for next outreach:  Confirm support follow up ( NEYDA Irizarry, [facility], [facility])    Referral made to [facility].   Confirmed plan to follow up with insurance provider prior to patient contact. Note routed to [place] with referral.         Ambulatory Care Coordination Assessment    Care Coordination Protocol  Referral from Primary Care Provider: No  Week 1 - Initial Assessment     Do you have all of your prescriptions and are they filled?: Yes  Barriers to medication adherence: None  Are you able to afford your medications?: Yes  How often do you have trouble taking your medications the way you have been told to take them?: I always take them as prescribed. Do you have Home O2 Therapy?: No      Ability to seek help/take action for Emergent Urgent situations i.e. fire, crime, inclement weather or health crisis. : Independent  Ability to ambulate to restroom: Needs Assistance  Ability handle personal hygeine needs (bathing/dressing/grooming): Independent  Ability to manage Medications: Independent  Ability to prepare Food Preparation: Independent  Ability to maintain home (clean home, laundry): Independent  Ability to drive and/or has transportation: Independent  Ability to do shopping: Independent  Ability to manage finances: Independent  Is patient able to live independently?: Yes     Current Housing: Private Residence        Per the Fall Risk Screening, did the patient have 2 or more falls or 1 fall with injury in the past year?: Yes  How often do you think you are about to fall and you do NOT fall?  For example, you grab something to stabilize yourself or hold onto a wall/furniture?: Frequently  Use of a Mobility Aid: Yes  Difficulty walking/impaired gait: Yes  Issues with feet or shoes like numbness, edema, shoes not fitting: Yes  Changes in vision, poor vision or poor lighting in environment: No  Dizziness: Yes  Other Fall Risk: Yes  What other fall risks does the patient have?: amputation toes     Frequent urination at night?: No  Do you use rails/bars?: No  Do you have a non-slip tub mat?: Yes     Are you experiencing loss of meaning?: Yes (Comment: Mother recently passed)  Are you experiencing loss of hope and peace?: Yes     Suggested Interventions and Community Resources  Fall Risk Prevention: In Process Home Health Services: In Process   Home Care Waiver: In Process   Registered Dietician: In Process   Social Work: In Process   Zone Management Tools: In Process         Set up/Review an Education Plan, Set up/Review Goals                Prior to Admission medications    Medication Sig Start Date End Date Taking?  Authorizing Provider   FLUoxetine (PROZAC) 40 MG capsule Take 2 capsules by mouth in the morning. 7/28/22 8/27/22 Yes RAFAEL Rivera CNP   Dulaglutide 0.75 MG/0.5ML SOPN Inject 0.75 mg into the skin once a week 6/30/22  Yes RAFAEL Rivera CNP   hydrOXYzine HCl (ATARAX) 25 MG tablet Take 1 tablet by mouth nightly 6/30/22 7/30/22 Yes RAFAEL Rivera CNP   insulin glargine (LANTUS SOLOSTAR) 100 UNIT/ML injection pen Inject 30 Units into the skin nightly 6/30/22 9/28/22 Yes Lissette Block MD   collagenase 250 UNIT/GM ointment Per instructions DAILY (route: topical) 5/23/22  Yes Historical Provider, MD   glyBURIDE (DIABETA) 5 MG tablet Take 10 mg by mouth in the morning and at bedtime   Yes Historical Provider, MD   Alcohol Swabs PADS  4/27/22  Yes Historical Provider, MD   metFORMIN (GLUCOPHAGE) 500 MG tablet Take 2 tablets by mouth 2 times daily (with meals) Indications: Start tomorrow 03/14 4/27/22 10/24/22 Yes Angelina Tinsley MD   pioglitazone (ACTOS) 30 MG tablet Take 1 tablet by mouth daily 4/27/22 7/28/22 Yes Angelina Tinsley MD   atorvastatin (LIPITOR) 40 MG tablet Take 1 tablet by mouth nightly 4/27/22 7/28/22 Yes Angelina Tinsley MD   losartan (COZAAR) 25 MG tablet Take 1 tablet by mouth daily 4/27/22  Yes Angelina Tinsley MD   metoprolol succinate (TOPROL XL) 25 MG extended release tablet Take 1 tablet by mouth daily 4/27/22  Yes Angelina Tinsley MD   blood glucose monitor strips Test 2 times a day & as needed for symptoms of irregular blood glucose. Dispense sufficient amount for indicated testing frequency plus additional to accommodate PRN testing needs. 4/27/22  Yes Austin Collier MD   omeprazole (PRILOSEC) 20 MG delayed release capsule Take 1 capsule by mouth once daily in the morning 4/27/22  Yes Austin Collier MD   blood glucose monitor kit and supplies Dispense sufficient amount for indicated testing frequency plus additional to accommodate PRN testing needs. Dispense all needed supplies to include: monitor, strips, lancing device, lancets, control solutions, alcohol swabs. 6/15/20  Yes Maria G Erwin MD   FreeStyle Lancets [facility] 1 each by Does not apply route daily 6/15/20  Yes Maria G Erwin MD   acetaminophen (TYLENOL) 325 MG tablet Take 2 tablets by mouth every 4 hours as needed for Pain or Fever 2/28/18  Yes Mary Ann Tomlinson MD       Future Appointments   Date Time Provider Rosario Lopezi   8/5/2022  1:00 PM Joseph Sanabria MD [facility]   8/18/2022 10:00 AM RAFAEL Ervin - CNP [facility]   9/15/2022 10:30 AM Luigi Chowdhury PSYD [facility]   11/29/2022  1:00 PM Estuardo Ortiz MD [id number]     ,   Diabetes Assessment    Medic Alert ID: No  Meal Planning: Carb counting, Calorie counting   How often do you test your blood sugar?: Meals   Do you have barriers with adherence to non-pharmacologic self-management interventions?  (Nutrition/Exercise/Self-Monitoring): Yes   Have you ever had to go to the ED for symptoms of low blood sugar?: No       Unhealing or open wounds, Other symptoms causing concern   Do you have hyperglycemia symptoms?: Yes   Frequency of Episodes: 1    Do you have hypoglycemia symptoms?: No   Last Blood Sugar Value: 538   Blood Sugar Monitoring Regimen: Before Meals   Blood Sugar Trends: Fluctuating        , and   General Assessment    Do you have any symptoms that are causing concern?: No

## 2022-07-28 NOTE — PROGRESS NOTES
diabetes mellitus with diabetic neuropathy, without long-term current use of insulin ([address]    3. Encounter for screening involving social determinants of health (SDoH)             REVIEW OF SYMPTOMS    Review of Systems   Constitutional:  Negative for chills, fever and unexpected weight change. Respiratory:  Negative for shortness of breath. Cardiovascular: Negative. Negative for chest pain, palpitations and leg swelling. Gastrointestinal:  Negative for abdominal pain, blood in stool, constipation, diarrhea, nausea and vomiting. Genitourinary:  Negative for difficulty urinating and hematuria. Skin:  Positive for wound (Left great toe removed; ball of left foot wound). Negative for color change. Neurological:  Negative for light-headedness. Psychiatric/Behavioral:  Positive for self-injury. The patient is nervous/anxious.       PAST MEDICAL HISTORY  Past Medical History:   Diagnosis Date    Abscess of left foot 4/22/2022    Anemia associated with acute blood loss 4/26/2022    Callus of foot     Right foot - see's Dr. Elba Zambrano    Cellulitis of left foot 4/22/2022    Diabetic ulcer of left midfoot associated with type 2 diabetes mellitus, with muscle involvement without evidence of necrosis ([address] 4/26/2022    Diabetic ulcer of left midfoot associated with type 2 diabetes mellitus, with necrosis of muscle ([address] 6/7/2021    Diabetic ulcer of right midfoot associated with type 2 diabetes mellitus, with fat layer exposed ([address] 8/11/2020    Dizziness     positional    Essential hypertension     Follows with PCP    Hyperlipidemia     Lumbar radiculopathy     Septic embolism ([address] 6/13/2020    Subacute osteomyelitis of left foot ([address] 4/22/2022       FAMILY HISTORY  Family History   Problem Relation Age of Onset    Heart Disease Father     Diabetes Father     Diabetes Mother     Heart Disease Mother     Other Mother     Kidney Disease Mother     Heart Attack Mother        SOCIAL HISTORY  Social History Socioeconomic History    Marital status: Single     Spouse name: None    Number of children: None    Years of education: None    Highest education level: None   Tobacco Use    Smoking status: Never    Smokeless tobacco: Never   Vaping Use    Vaping Use: Never used   Substance and Sexual Activity    Alcohol use: Yes     Comment: \"couple beers a night\"    Drug use: No    Sexual activity: Yes     Partners: Female     Social Determinants of Health     Financial Resource Strain: Medium Risk    Difficulty of Paying Living Expenses: Somewhat hard   Food Insecurity: Food Insecurity Present    Worried About Running Out of Food in the Last Year: Sometimes true    Ran Out of Food in the Last Year: Sometimes true   Transportation Needs: No Transportation Needs    Lack of Transportation (Medical): No    Lack of Transportation (Non-Medical):  No   Physical Activity: Inactive    Days of Exercise per Week: 0 days    Minutes of Exercise per Session: 0 min   Stress: Stress Concern Present    Feeling of Stress : Rather much   Social Connections: Unknown    Frequency of Communication with Friends and Family: Once a week    Attends Quaker Services: Never    Attends Club or Organization Meetings: Never    Marital Status: Never    Housing Stability: Low Risk     Unable to Pay for Housing in the Last Year: No    Number of Places Lived in the Last Year: 1    Unstable Housing in the Last Year: No        SURGICAL HISTORY  Past Surgical History:   Procedure Laterality Date    ACHILLES TENDON SURGERY Right 1/8/2021    RIGHT [address] performed by Marilyn Ramirez DPM at [address] [place] [address]  03/2018    [facility]      teeth extractions -half per patient    HERNIA REPAIR Bilateral 5/27/2020    BIALTERAL HERNIA INGUINAL REPAIR performed by Shruthi Sanon MD at [facility] 30 2018    AR OFFICE/OUTPT VISIT,PROCEDURE ONLY Left 4/17/2018    L5-S1 HEMILAMINECTOMY, REMOVAL OF DISC LEFT SIDE performed by Robert Richard MD at [facility] Right 1/8/2021    RIGHT TRANSMETATARSAL TOE AMPUTATION performed by Carney Halsted, DPM at [facility] Left 4/23/2022    LEFT RAY GREAT TOE AMPUTATION performed by Ruth Sumner MD at [address]  Current Outpatient Medications   Medication Sig Dispense Refill    FLUoxetine (PROZAC) 40 MG capsule Take 2 capsules by mouth in the morning. 30 capsule 3    Dulaglutide 0.75 MG/0.5ML SOPN Inject 0.75 mg into the skin once a week 1 pen 3    hydrOXYzine HCl (ATARAX) 25 MG tablet Take 1 tablet by mouth nightly 30 tablet 2    insulin glargine (LANTUS SOLOSTAR) 100 UNIT/ML injection pen Inject 30 Units into the skin nightly 5 pen 2    collagenase 250 UNIT/GM ointment Per instructions DAILY (route: topical)      glyBURIDE (DIABETA) 5 MG tablet Take 10 mg by mouth in the morning and at bedtime      Alcohol Swabs PADS       metFORMIN (GLUCOPHAGE) 500 MG tablet Take 2 tablets by mouth 2 times daily (with meals) Indications: Start tomorrow 03/14 360 tablet 1    pioglitazone (ACTOS) 30 MG tablet Take 1 tablet by mouth daily 90 tablet 1    atorvastatin (LIPITOR) 40 MG tablet Take 1 tablet by mouth nightly 90 tablet 1    losartan (COZAAR) 25 MG tablet Take 1 tablet by mouth daily 30 tablet 5    metoprolol succinate (TOPROL XL) 25 MG extended release tablet Take 1 tablet by mouth daily 90 tablet 1    blood glucose monitor strips Test 2 times a day & as needed for symptoms of irregular blood glucose. Dispense sufficient amount for indicated testing frequency plus additional to accommodate PRN testing needs.  100 strip 0    omeprazole (PRILOSEC) 20 MG delayed release capsule Take 1 capsule by mouth once daily in the morning 90 capsule 1    blood glucose monitor kit and supplies Dispense sufficient amount for indicated testing frequency plus additional Affect is labile and tearful. Speech: Speech normal.         Behavior: Behavior normal. Behavior is cooperative. Thought Content: Thought content does not include homicidal or suicidal ideation. Thought content does not include homicidal or suicidal plan. Judgment: Judgment is impulsive. ASSESSMENT & PLAN    Viji Parada was seen today for follow-up. Diagnoses and all orders for this visit:    Severe episode of recurrent major depressive disorder, without psychotic features (HCC)  -     FLUoxetine (PROZAC) 40 MG capsule; Take 2 capsules by mouth in the morning.  -     Amb External Referral To Psychiatry  - Previous referral to psychology is pending review  - Patient with no current plan for self-harm/suicide  - Encouraged patient to reach out for help via 911 if he feels that he is going to harm himself, patient is agreeable to that  - Planning close follow-up visits until patient can get established with psychiatry    Type 2 diabetes mellitus with diabetic neuropathy, without long-term current use of insulin ([facility] [address]  -     Hemoglobin A1C; Future  - Will review results and consider medication adjustments as appropriate  - Will consider referral to diabetic educator    Encounter for screening involving social determinants of health (SDoH)        - referral made to ambulatory case management        - loss of housing and car will greatly affect all all aspects of patient's health both mental and physical    Return in about 3 weeks (around 8/18/2022).          Electronically signed by RAFAEL Sheets Mai, CNP on 7/28/2022

## 2022-07-29 ENCOUNTER — TELEPHONE (OUTPATIENT)
Dept: FAMILY MEDICINE CLINIC | Age: 42
End: 2022-07-29

## 2022-07-29 ENCOUNTER — CARE COORDINATION (OUTPATIENT)
Dept: CARE COORDINATION | Age: 42
End: 2022-07-29

## 2022-07-29 DIAGNOSIS — I10 ESSENTIAL HYPERTENSION: ICD-10-CM

## 2022-07-29 DIAGNOSIS — F33.2 SEVERE EPISODE OF RECURRENT MAJOR DEPRESSIVE DISORDER, WITHOUT PSYCHOTIC FEATURES (HCC): Primary | ICD-10-CM

## 2022-07-29 DIAGNOSIS — K21.9 GASTROESOPHAGEAL REFLUX DISEASE WITHOUT ESOPHAGITIS: ICD-10-CM

## 2022-07-29 LAB
ESTIMATED AVERAGE GLUCOSE: 254.7 MG/DL
HBA1C MFR BLD: 10.5 %

## 2022-07-29 RX ORDER — METOPROLOL SUCCINATE 25 MG/1
25 TABLET, EXTENDED RELEASE ORAL DAILY
Qty: 90 TABLET | Refills: 1 | Status: ON HOLD | OUTPATIENT
Start: 2022-07-29 | End: 2022-09-25 | Stop reason: HOSPADM

## 2022-07-29 RX ORDER — OMEPRAZOLE 20 MG/1
CAPSULE, DELAYED RELEASE ORAL
Qty: 90 CAPSULE | Refills: 1 | Status: SHIPPED | OUTPATIENT
Start: 2022-07-29 | End: 2022-09-02

## 2022-07-29 RX ORDER — LOSARTAN POTASSIUM 25 MG/1
25 TABLET ORAL DAILY
Qty: 90 TABLET | Refills: 1 | Status: ON HOLD | OUTPATIENT
Start: 2022-07-29 | End: 2022-09-25 | Stop reason: HOSPADM

## 2022-07-29 RX ORDER — GLYBURIDE 5 MG/1
10 TABLET ORAL 2 TIMES DAILY
Qty: 90 TABLET | Refills: 1 | Status: SHIPPED | OUTPATIENT
Start: 2022-07-29 | End: 2022-08-01 | Stop reason: SDUPTHER

## 2022-07-29 NOTE — TELEPHONE ENCOUNTER
----- Message from RAFAEL Mckenzie CNP sent at 7/28/2022  7:21 PM EDT -----  Regarding: Dr. Cassell Cooks  Can we please make sure that the referral gets faxed to Dr. Daxa Painting office. Also would consider calling to see if we can expedite a visit related to recent suicide/self-harm attempt.       Matt Deutsch MD   [address] [phone]

## 2022-07-29 NOTE — TELEPHONE ENCOUNTER
Faxed referral to Dr Leon Robles along with supporting office note     Left detailed message for Dr Leon Robles office [phone] informing referral sent , please review, pt was inpt for attempted suicide needs seen fairly soon.  Please contact our office and let us know

## 2022-07-29 NOTE — TELEPHONE ENCOUNTER
Spoke with pt informed Dr Leon Robles doesn't take his insurance. Need to be seen by [facility]. We are aware pt has an appt 9/15/22 with Agnes Juárez. Resending referral requesting a sooner appt and pt is to give [facility] a follow up call.

## 2022-07-29 NOTE — CARE COORDINATION
Attempted phone call to Pt to complete initial sw assessment. No answer, voicemail message left requesting return phone call, contact information provided. SW plan of care: SW will make second outreach attempt to complete initial sw assessment on 8/1.

## 2022-08-01 ENCOUNTER — TELEPHONE (OUTPATIENT)
Dept: FAMILY MEDICINE CLINIC | Age: 42
End: 2022-08-01

## 2022-08-01 ENCOUNTER — CARE COORDINATION (OUTPATIENT)
Dept: CARE COORDINATION | Age: 42
End: 2022-08-01

## 2022-08-01 DIAGNOSIS — E11.40 TYPE 2 DIABETES MELLITUS WITH DIABETIC NEUROPATHY, WITHOUT LONG-TERM CURRENT USE OF INSULIN (HCC): ICD-10-CM

## 2022-08-01 RX ORDER — GLYBURIDE 5 MG/1
10 TABLET ORAL 2 TIMES DAILY
Qty: 120 TABLET | Refills: 1 | Status: SHIPPED | OUTPATIENT
Start: 2022-08-01

## 2022-08-01 NOTE — CARE COORDINATION
SW did receive voicemail message from [employer] stating he needs housing, requesting return phone call. Attempted call to [employer], no answer, voicemail message left.   SW discussed plans to attempt to reach him on 8/2 between 9am and 9:30am.

## 2022-08-01 NOTE — TELEPHONE ENCOUNTER
Directions clarified with pharmacy, prescription canceled, new correct prescription pended to [facility].

## 2022-08-01 NOTE — TELEPHONE ENCOUNTER
Medication quantity was incorrect takes 2 tabs bid and prescription was written for a quantity of 90 and not 120.

## 2022-08-01 NOTE — CARE COORDINATION
Return call  received from patient. Patient reports that he is doing fine. Denies any changes in condition. Patient reports that he had his number changed and was concerned that he has missed LSW and other agency return call. Patient's new number is [phone]. Updated chart information. [facility] to notify [facility] of change in contact. Provided LSW contact information as per patient request.  Patient verbalized his plan to reach out today. Spoke with [employer]. Updated patient contact. Confirmed plan for [employer]. [facility] contact information confirmed should questions arise. E-mail sent to [facility] for awareness.

## 2022-08-01 NOTE — TELEPHONE ENCOUNTER
----- Message from Judge Mayer sent at 8/1/2022  9:44 AM EDT -----  Subject: Refill Request    QUESTIONS  Name of Medication? glyBURIDE (DIABETA) 5 MG tablet  Patient-reported dosage and instructions? 2 TAB PO BID  How many days do you have left? 0  Preferred Pharmacy? [facility]  Pharmacy phone number (if available)? [phone]  ---------------------------------------------------------------------------  --------------  CALL BACK INFO  What is the best way for the office to contact you? OK to leave message on   voicemail  Preferred Call Back Phone Number? [phone]  ---------------------------------------------------------------------------  --------------  SCRIPT ANSWERS  Relationship to Patient?  Self

## 2022-08-02 ENCOUNTER — CARE COORDINATION (OUTPATIENT)
Dept: CARE COORDINATION | Age: 42
End: 2022-08-02

## 2022-08-02 NOTE — CARE COORDINATION
Initial Contact Social Work Note - Ambulatory  8/2/2022      Date of referral: 7/28/22  Referral received from: [facility]  Reason for referral: housing, utilities, groceries, ohcw    Previous  referral: No  If yes, brief summary of outcome: n/a    Two Identifiers Verified: Yes    Insurance Provider: Leonel GOMEZ [address]:  Sibling(s) Pt has a brother who lives 5 hours away and has a sister who is nearby, not much support      Status:  n/a    Community Providers: Local Behavioral Provider - has an appointment in November to meet with psychiatrist for depression    ADL Assistance Needed: N/A    Housing/Living Concerns or Home Modification Needs: Pt reported he is living him his mom's home, his mom passed away in May. Pt reported difficulty making house payments, can't keep up with bills as he is on disability. Transportation Concern: Pt has a car, but believes it will be repossessed as he cannot make payments. Can obtain transportation through insurance to medical appointments. Pt will contact his insurance to verify if they will also provide transportation to grocery store. Medication Cost Concern: n/a      Medication Adherence Concern: n/a    Financial Concern(s): Pt reported his mom passed in May and he is not able to keep with up the [employer]. Income (only if applicable): unk    Ability to Read/Write: Yes    Advance Care Plan:  Not on file; educated patient    Other: Phone call to Pt. Pt was alert and oriented x3. Pt did identify that he is living in his mother's house, which is still in her name. She passed away in May. Pt is trying to go to Probate court as the house was left to him in her will, but is still currently in her name. Pt reported the car and utilities remain in her name. Pt reported he believes the car may be repossessed soon and the house will go into foreclosure soon.   Discussed options of contacting [facility], [facility] and [address] [place] [address] for utility / rent assistance. KOLE will send via email to [email or web address]     KOLE sent information via email on low income housing, utility assistance and [address] regarding foreclosure prevention. Identified Needs:  Housing / foreclosure prevention / rent assistance and obtaining low income housing  Utility assistance      Social Work Plan:  SW will confirm Pt received email with resources regarding utilities, housing and foreclosure  SW will contact Pt on Friday 8/5 to follow up    Next Steps: SW will assist Pt with utility assistance and obtaining housing by providing resources and support. KOLE will follow up with Pt on 8/5. Method of Communication With Provider (if appropriate): Chart Routing       Goals Addressed                      This Visit's Progress      Patient Stated (pt-stated)         PT stated concerns with being able to afford the mortgage payment, car payment and utility bills. Barriers: financial, lack of support, overwhelmed by complexity of regimen, stress, and time constraints  Plan for overcoming my barriers: Francy Vazquez will contact community service agencies to apply for assistance. Francy Vazquez will begin to contact low income housing apartments to identify housing.     Confidence: 7/10  Anticipated Goal Completion Date: 11/2/22

## 2022-08-05 ENCOUNTER — CARE COORDINATION (OUTPATIENT)
Dept: CARE COORDINATION | Age: 42
End: 2022-08-05

## 2022-08-05 NOTE — CARE COORDINATION
Attempted phone call to Zak An to follow up regarding utility / rent assistance, housing, ohcw. No answer, voicemail message left requesting return phone call, contact number provided. KOLE plan of care:  KOLE will confirm Pt received email with community resources for utility / rent assistance, housing.

## 2022-08-08 ENCOUNTER — CARE COORDINATION (OUTPATIENT)
Dept: CARE COORDINATION | Age: 42
End: 2022-08-08

## 2022-08-09 ENCOUNTER — CARE COORDINATION (OUTPATIENT)
Dept: CARE COORDINATION | Age: 42
End: 2022-08-09

## 2022-08-11 ENCOUNTER — HOSPITAL ENCOUNTER (OUTPATIENT)
Dept: WOUND CARE | Age: 42
Discharge: HOME | End: 2022-08-11
Payer: COMMERCIAL

## 2022-08-11 VITALS
DIASTOLIC BLOOD PRESSURE: 91 MMHG | RESPIRATION RATE: 20 BRPM | SYSTOLIC BLOOD PRESSURE: 145 MMHG | TEMPERATURE: 98 F | HEART RATE: 96 BPM

## 2022-08-11 DIAGNOSIS — L97.422 DIABETIC ULCER OF LEFT MIDFOOT ASSOCIATED WITH TYPE 2 DIABETES MELLITUS, WITH FAT LAYER EXPOSED (HCC): ICD-10-CM

## 2022-08-11 DIAGNOSIS — L97.412 DIABETIC ULCER OF RIGHT MIDFOOT ASSOCIATED WITH TYPE 2 DIABETES MELLITUS, WITH FAT LAYER EXPOSED (HCC): ICD-10-CM

## 2022-08-11 DIAGNOSIS — E11.621 DIABETIC ULCER OF LEFT MIDFOOT ASSOCIATED WITH TYPE 2 DIABETES MELLITUS, WITH FAT LAYER EXPOSED (HCC): ICD-10-CM

## 2022-08-11 DIAGNOSIS — E11.621 DIABETIC ULCER OF RIGHT MIDFOOT ASSOCIATED WITH TYPE 2 DIABETES MELLITUS, WITH FAT LAYER EXPOSED (HCC): ICD-10-CM

## 2022-08-11 DIAGNOSIS — Z89.432 PARTIAL NONTRAUMATIC AMPUTATION OF FOOT, LEFT (HCC): Primary | ICD-10-CM

## 2022-08-11 PROCEDURE — 11045 DBRDMT SUBQ TISS EACH ADDL: CPT | Performed by: NURSE PRACTITIONER

## 2022-08-11 PROCEDURE — 11042 DBRDMT SUBQ TIS 1ST 20SQCM/<: CPT

## 2022-08-11 PROCEDURE — 99213 OFFICE O/P EST LOW 20 MIN: CPT

## 2022-08-11 PROCEDURE — 11042 DBRDMT SUBQ TIS 1ST 20SQCM/<: CPT | Performed by: NURSE PRACTITIONER

## 2022-08-11 RX ORDER — CLOBETASOL PROPIONATE 0.5 MG/G
OINTMENT TOPICAL ONCE
Status: CANCELLED | OUTPATIENT
Start: 2022-08-11 | End: 2022-08-11

## 2022-08-11 RX ORDER — GINSENG 100 MG
CAPSULE ORAL ONCE
Status: CANCELLED | OUTPATIENT
Start: 2022-08-11 | End: 2022-08-11

## 2022-08-11 RX ORDER — LIDOCAINE HYDROCHLORIDE 40 MG/ML
SOLUTION TOPICAL ONCE
Status: CANCELLED | OUTPATIENT
Start: 2022-08-11 | End: 2022-08-11

## 2022-08-11 RX ORDER — BACITRACIN, NEOMYCIN, POLYMYXIN B 400; 3.5; 5 [USP'U]/G; MG/G; [USP'U]/G
OINTMENT TOPICAL ONCE
Status: CANCELLED | OUTPATIENT
Start: 2022-08-11 | End: 2022-08-11

## 2022-08-11 RX ORDER — LIDOCAINE HYDROCHLORIDE 20 MG/ML
JELLY TOPICAL ONCE
Status: CANCELLED | OUTPATIENT
Start: 2022-08-11 | End: 2022-08-11

## 2022-08-11 RX ORDER — GENTAMICIN SULFATE 1 MG/G
OINTMENT TOPICAL ONCE
Status: CANCELLED | OUTPATIENT
Start: 2022-08-11 | End: 2022-08-11

## 2022-08-11 RX ORDER — BETAMETHASONE DIPROPIONATE 0.05 %
OINTMENT (GRAM) TOPICAL ONCE
Status: CANCELLED | OUTPATIENT
Start: 2022-08-11 | End: 2022-08-11

## 2022-08-11 RX ORDER — SULFAMETHOXAZOLE AND TRIMETHOPRIM 800; 160 MG/1; MG/1
1 TABLET ORAL 2 TIMES DAILY
Qty: 28 TABLET | Refills: 0 | Status: SHIPPED | OUTPATIENT
Start: 2022-08-11 | End: 2022-10-26 | Stop reason: SDUPTHER

## 2022-08-11 RX ORDER — LIDOCAINE 40 MG/G
CREAM TOPICAL ONCE
Status: CANCELLED | OUTPATIENT
Start: 2022-08-11 | End: 2022-08-11

## 2022-08-11 RX ORDER — CEFUROXIME AXETIL 500 MG/1
500 TABLET ORAL 2 TIMES DAILY
Qty: 28 TABLET | Refills: 0 | Status: SHIPPED | OUTPATIENT
Start: 2022-08-11 | End: 2022-08-25

## 2022-08-11 RX ORDER — LIDOCAINE 50 MG/G
OINTMENT TOPICAL ONCE
Status: CANCELLED | OUTPATIENT
Start: 2022-08-11 | End: 2022-08-11

## 2022-08-11 RX ORDER — BACITRACIN ZINC AND POLYMYXIN B SULFATE 500; 1000 [USP'U]/G; [USP'U]/G
OINTMENT TOPICAL ONCE
Status: CANCELLED | OUTPATIENT
Start: 2022-08-11 | End: 2022-08-11

## 2022-08-11 NOTE — DISCHARGE INSTRUCTIONS
PHYSICIAN ORDERS AND DISCHARGE INSTRUCTIONS     NOTE: Upon discharge from the [address], you will receive a patient experience survey. We would be grateful if you would take the time to fill this survey out. Wound care order history:                 YADY's   Right       Left               Date:        Wound cleansing:              Do not scrub or use excessive force. Wash hands with soap and water before and after dressing changes. Prior to applying a clean dressing, cleanse wound with normal saline, wound cleanser, or mild soap and water. Ask the physician or nurse before getting the wound(s) wet in a shower     Daily Wound management:              Keep weight off wounds and reposition every 2 hours. Avoid standing for long periods of time. Apply wraps/stockings in AM and remove at bedtime. If swelling is present, elevate legs to the level of the heart or above for 30 minutes 4-5 times a day and/or when sitting. When taking antibiotics take entire prescription as ordered by physician do not stop taking until medicine is all gone. Wound Care Notes:  Rx: [facility]  Right and Left foot wounds cultured 11/4/21  Apply for wound vac 05/11/22 will arrive 05/18/22  Apply for grafts 05/11/22: aLL GRAFTS approved 06/08/22 for Left Toe amp site  Santyl ordered 05/11/22        Grafts Left Foot Amp Site  Puraply #1 Graft #1 4x4 fenestrated 06/15/22    Puraply #2 Graft #2 4x4 fenestrated 06/22/22                                    Orders for this week:  08/11/22               [facility] TO [facility]! Right and Left Plantar, and Left Great toe amp site--Clean with soap and water, pat  dry. Apply anasept gel and stimulen powder to wound bed. Cover with Ca Alginate and Sorbex. Wrap with Coban 2 Lite. Leave in place until Nurse Visit Monday.            Nurse Visit Monday  Follow Up Instructions: At the [address] in 1 week:  Primary Wound Care Provider: Dr Panda Said  Call  for any questions or concerns.   Central [phone]

## 2022-08-11 NOTE — PROGRESS NOTES
Control and Offloading. Patient educated on those factors that negatively effect or impact wound healing: smoking, obesity, uncontrolled diabetes, anticoagulant and immunosuppressive regimens, inadequate nutrition, untreated arterial and venous disease if applicable and measures to manage edema. Nutritional status: well nourished. Discussed need for increased protein and calories for wound healing and good sources of protein (just over 7 grams for every 20 pounds of body weight). Animal-based foods high in protein (meat, poultry, fish, eggs, and dairy foods). Plant based foods high in protein (tofu, lentils, beans, chickpeas, nuts, quinoa and den seeds. Off Loading  Offloading or minimizing or removing weight placed on an area with poor circulation such as diabetic wounds or pressure. This can be achieved with crutches, wheel chair, knee walker etc. Minimizing pressure through partial weight bearing (minimizing the amount of  pressure applied and or the amount of time on the area of pressure) or maintaining a non-weight bearing status can be used to promote and often can be essential for thee wound to heal. Off loading may also need to be achieved for non-weight bearing wounds such as pressure ulcers to the torso. Turning and changing positions frequently, at least every two hours. Use of pressure cushion if sitting up in chair. Skin Care  Keep skin clean and well moisturized , moisturize routinely with ointments for heavier moisturizer needs for extremely dry skin or cracks such as A&D ointment and lotions for a light moisturizer such as CeraVe or Eucerin. If incontinent change incontinence garments as soon as soiled and keeping skin clean and use barrier cream to protect the skin.         PAST MEDICAL HISTORY        Diagnosis Date    Abscess of left foot 4/22/2022    Anemia associated with acute blood loss 4/26/2022    Callus of foot     Right foot - see's Dr. Michele Sepulveda    Cellulitis of left foot 4/22/2022    Diabetic ulcer of left midfoot associated with type 2 diabetes mellitus, with muscle involvement without evidence of necrosis ([address] 4/26/2022    Diabetic ulcer of left midfoot associated with type 2 diabetes mellitus, with necrosis of muscle ([address] 6/7/2021    Diabetic ulcer of right midfoot associated with type 2 diabetes mellitus, with fat layer exposed ([address] 8/11/2020    Dizziness     positional    Essential hypertension     Follows with PCP    Hyperlipidemia     Lumbar radiculopathy     Septic embolism ([address] 6/13/2020    Subacute osteomyelitis of left foot ([address] 4/22/2022       PAST SURGICAL HISTORY    Past Surgical History:   Procedure Laterality Date    ACHILLES TENDON SURGERY Right 1/8/2021    RIGHT ACHILLES TENDON LENGTHENING REPAIR performed by Kary Tijerina DPM at [facility]  03/2018    [address] [place] [address]    DENTAL SURGERY      teeth extractions -half per patient    HERNIA REPAIR Bilateral 5/27/2020    BIALTERAL HERNIA INGUINAL REPAIR performed by Hermelinda Sahni MD at [facility] 2018    FL OFFICE/OUTPT VISIT,PROCEDURE ONLY Left 4/17/2018    L5-S1 HEMILAMINECTOMY, REMOVAL OF [address] LEFT SIDE performed by Radha Howard MD at [address] Right 1/8/2021    RIGHT TRANSMETATARSAL TOE AMPUTATION performed by Kary Tijerina DPM at [address] Left 4/23/2022    LEFT RAY GREAT TOE AMPUTATION performed by Vishnu Del Toro MD at [address] [facility]    Family History   Problem Relation Age of Onset    Heart Disease Father     Diabetes Father     Diabetes Mother     Heart Disease Mother     Other Mother     Kidney Disease Mother     Heart Attack Mother        SOCIAL HISTORY    Social History     Tobacco Use    Smoking status: Never    Smokeless tobacco: Never   Vaping Use    Vaping Use: Never used   Substance Use Topics    Alcohol use: Yes     Comment: \"couple beers a night\"    Drug use: No       ALLERGIES    No Known Allergies    MEDICATIONS    Current Outpatient Medications on File Prior to Encounter   Medication Sig Dispense Refill    glyBURIDE (DIABETA) 5 MG tablet Take 2 tablets by mouth in the morning and at bedtime 120 tablet 1    Dulaglutide 1.5 MG/0.5ML SOPN Inject 1.5 mg into the skin once a week 5 pen 3    metFORMIN (GLUCOPHAGE) 500 MG tablet Take 2 tablets by mouth in the morning and 2 tablets in the evening. Take with meals. Indications: Start tomorrow 03/14. 360 tablet 1    omeprazole (PRILOSEC) 20 MG delayed release capsule Take 1 capsule by mouth once daily in the morning 90 capsule 1    metoprolol succinate (TOPROL XL) 25 MG extended release tablet Take 1 tablet by mouth in the morning. 90 tablet 1    losartan (COZAAR) 25 MG tablet Take 1 tablet by mouth in the morning. 90 tablet 1    FLUoxetine (PROZAC) 40 MG capsule Take 2 capsules by mouth in the morning. 30 capsule 3    insulin glargine (LANTUS SOLOSTAR) 100 UNIT/ML injection pen Inject 30 Units into the skin nightly 5 pen 2    collagenase 250 UNIT/GM ointment Per instructions DAILY (route: topical)      Alcohol Swabs PADS       pioglitazone (ACTOS) 30 MG tablet Take 1 tablet by mouth daily 90 tablet 1    atorvastatin (LIPITOR) 40 MG tablet Take 1 tablet by mouth nightly 90 tablet 1    blood glucose monitor strips Test 2 times a day & as needed for symptoms of irregular blood glucose. Dispense sufficient amount for indicated testing frequency plus additional to accommodate PRN testing needs. 100 strip 0    blood glucose monitor kit and supplies Dispense sufficient amount for indicated testing frequency plus additional to accommodate PRN testing needs. Dispense all needed supplies to include: monitor, strips, lancing device, lancets, control solutions, alcohol swabs.  1 kit 0    FreeStyle Lancets MISC 1 each by Does not apply route daily 100 each 3    acetaminophen (TYLENOL) 325 MG tablet Take 2 tablets by mouth every 4 hours as needed for Pain or Fever 120 tablet 3     No current facility-administered medications on file prior to encounter. REVIEW OF SYSTEMS    Pertinent items are noted in HPI. Constitutional: Negative for systemic symptoms including fever, chills and malaise. Objective:      BP (!) 145/91   Pulse 96   Temp 98 °F (36.7 °C) (Temporal)   Resp 20     PHYSICAL EXAM      General: The patient is in no acute distress. Mental status:  Patient is appropriate, is  oriented to place and plan of care. Dermatologic exam: Visual inspection of the periwound reveals the skin to be normal in turgor and texture  Wound exam: see wound description below in procedure note      Assessment:     Problem List Items Addressed This Visit          Endocrine    WD-Diabetic ulcer of left midfoot associated with type 2 diabetes mellitus, with fat layer exposed ([address]    WD-Diabetic ulcer of right midfoot associated with type 2 diabetes mellitus, with fat layer exposed ([address]       Other    WD-Partial nontraumatic amputation of foot, left ([address] - Primary       Procedure Note    Indications:  Based on my examination of this patient's wound(s) today, sharp excision into necrotic subcutaneous tissue is required to promote healing and evaluate the extent of previous healing. Performed by: RAFAEL Ansari - CNP    Consent obtained: Yes    Time out taken:  Yes    Pain Control: Anesthetic  Anesthetic: 4% Lidocaine Liquid Topical      Debridement:Excisional Debridement    Using curette and tissue nippers the wound(s) was/were sharply debrided down through and including the removal of subcutaneous tissue.         Devitalized Tissue Debrided:  slough, exudate and callus    Pre Debridement Measurements:  Are located in the Wound Documentation Flow Sheet    All active wounds listed below with today's date are evaluated  Wound(s)    debrided this date include # : 1, 2 & 3    Post  Debridement Measurements:  Negative Pressure Wound Therapy (Active)   Unit Type KCI 06/08/22 1015   Dressing Type Black Foam 06/08/22 1015   Number of pieces used 1 06/15/22 1126   Number of pieces removed 1 06/08/22 1015   Cycle Continuous 06/15/22 1126   Target Pressure (mmHg) 125 06/15/22 1126   Canister changed?  Yes 05/25/22 1126   Dressing Changed Changed/New 05/25/22 1126   Dressing Change Due 05/27/22 05/25/22 1126   Number of days: 23   Wound 05/11/22 #1 (onset 2 weeks) Left Medial Foot Amp Site (Active)   Wound Image   06/08/22 1015   Wound Etiology Surgical 06/15/22 1000   Dressing Status New dressing applied 06/15/22 1126   Wound Cleansed Soap and water 06/15/22 1000   Dressing/Treatment Alginate 05/18/22 1123   Offloading for Diabetic Foot Ulcers Post op shoe 06/15/22 1126   Wound Length (cm) 8.5 cm 06/15/22 1000   Wound Width (cm) 2.9 cm 06/15/22 1000   Wound Depth (cm) 0.4 cm 06/15/22 1000   Wound Surface Area (cm^2) 24.65 cm^2 06/15/22 1000   Change in Wound Size % (l*w) 52.09 06/15/22 1000   Wound Volume (cm^3) 9.86 cm^3 06/15/22 1000   Wound Healing % 62 06/15/22 1000   Post-Procedure Length (cm) 8.5 cm 06/15/22 1028   Post-Procedure Width (cm) 2.9 cm 06/15/22 1028   Post-Procedure Depth (cm) 0.4 cm 06/15/22 1028   Post-Procedure Surface Area (cm^2) 24.65 cm^2 06/15/22 1028   Post-Procedure Volume (cm^3) 9.86 cm^3 06/15/22 1028   Distance Tunneling (cm) 0 cm 06/15/22 1000   Tunneling Position ___ O'Clock 0 06/15/22 1000   Undermining Starts ___ O'Clock 0 06/15/22 1000   Undermining Ends___ O'Clock 0 06/15/22 1000   Undermining Maxium Distance (cm) 0 06/15/22 1000   Wound Assessment Pink/red;Slough 06/15/22 1000   Drainage Amount Moderate 06/15/22 1000   Drainage Description Serosanguinous 06/15/22 1000   Odor None 06/15/22 1000   Rosalie-wound Assessment Intact 06/15/22 1000   Margins Defined edges 06/15/22 1000   Wound Thickness Description not for Pressure Injury Full thickness 06/15/22 1000   Number of days: 37       Wound 05/11/22 #2 (onset unknown) Left Plantar (Active)   Wound Image   06/08/22 1015   Dressing Status New dressing applied 06/15/22 1126   Wound Cleansed Soap and water 06/15/22 1000   Dressing/Treatment Alginate 05/18/22 1123   Offloading for Diabetic Foot Ulcers Post op shoe 06/15/22 1126   Wound Length (cm) 1.4 cm 06/15/22 1000   Wound Width (cm) 1.5 cm 06/15/22 1000   Wound Depth (cm) 0.2 cm 06/15/22 1000   Wound Surface Area (cm^2) 2.1 cm^2 06/15/22 1000   Change in Wound Size % (l*w) -31.25 06/15/22 1000   Wound Volume (cm^3) 0.42 cm^3 06/15/22 1000   Wound Healing % -31 06/15/22 1000   Post-Procedure Length (cm) 1.4 cm 06/15/22 1028   Post-Procedure Width (cm) 1.5 cm 06/15/22 1028   Post-Procedure Depth (cm) 0.2 cm 06/15/22 1028   Post-Procedure Surface Area (cm^2) 2.1 cm^2 06/15/22 1028   Post-Procedure Volume (cm^3) 0.42 cm^3 06/15/22 1028   Distance Tunneling (cm) 0 cm 06/15/22 1000   Tunneling Position ___ O'Clock 0 06/15/22 1000   Undermining Starts ___ O'Clock 0 06/15/22 1000   Undermining Ends___ O'Clock 0 06/15/22 1000   Undermining Maxium Distance (cm) 0 06/15/22 1000   Wound Assessment Pink/red;Slough 06/15/22 1000   Drainage Amount Moderate 06/15/22 1000   Drainage Description Yellow 06/15/22 1000   Odor None 06/15/22 1000   Rosalie-wound Assessment Hyperkeratosis (callous) 06/15/22 1000   Margins Defined edges 06/15/22 1000   Wound Thickness Description not for Pressure Injury Full thickness 06/15/22 1000   Number of days: 37       Wound 05/11/22 #3 (onset unknown) Right Plantar (Active)   Wound Image   06/08/22 1015   Dressing Status New dressing applied 06/15/22 1126   Wound Cleansed Soap and water 06/15/22 1000   Offloading for Diabetic Foot Ulcers Walker 06/15/22 1000   Wound Length (cm) 0.7 cm 06/15/22 1000   Wound Width (cm) 0.1 cm 06/15/22 1000   Wound Depth (cm) 0.1 cm 06/15/22 1000   Wound Surface Area (cm^2) 0.07 cm^2 06/15/22 1000   Change in Wound Size % (l*w) 94.81 06/15/22 1000   Wound Volume (cm^3) 0.007 cm^3 06/15/22 1000   Wound Healing % 99 06/15/22 1000   Post-Procedure Length (cm) 0.7 cm 06/15/22 1028   Post-Procedure Width (cm) 0.1 cm 06/15/22 1028   Post-Procedure Depth (cm) 0.1 cm 06/15/22 1028   Post-Procedure Surface Area (cm^2) 0.07 cm^2 06/15/22 1028   Post-Procedure Volume (cm^3) 0.007 cm^3 06/15/22 1028   Distance Tunneling (cm) 0 cm 06/15/22 1000   Tunneling Position ___ O'Clock 0 06/15/22 1000   Undermining Starts ___ O'Clock 0 06/15/22 1000   Undermining Ends___ O'Clock 0 06/15/22 1000   Undermining Maxium Distance (cm) 0 06/15/22 1000   Wound Assessment Pink/red 06/15/22 1000   Drainage Amount Moderate 06/15/22 1000   Drainage Description Serosanguinous 06/15/22 1000   Odor None 06/15/22 1000   Rosalie-wound Assessment Intact 06/15/22 1000   Margins Defined edges 06/15/22 1000   Wound Thickness Description not for Pressure Injury Full thickness 06/15/22 1000   Number of days: 37     Percent of Wound(s) Debrided: approximately 100%    Total  Area  Debrided: 26.82 sq cm     Bleeding:  Minimal    Hemostasis Achieved:  not needed    Procedural Pain:  0  / 10     Post Procedural Pain:  0 / 10     Response to treatment:  Well tolerated by patient. Status of wound progress and description from last visit: Stable, has some redness on the dorsal left foot, will start on Bactrim and Ceftin for 14 days. Will use anasept to minimize bio burden, collagen to build tissue, heavily pad and use compression wrap. Plan:       Discharge Instructions         PHYSICIAN ORDERS AND DISCHARGE INSTRUCTIONS     NOTE: Upon discharge from the [address], you will receive a patient experience survey. We would be grateful if you would take the time to fill this survey out. Wound care order history:                 YADY's   Right       Left               Date:        Wound cleansing:              Do not scrub or use excessive force. Wash hands with soap and water before and after dressing changes. Prior to applying a clean dressing, cleanse wound with normal saline, wound cleanser, or mild soap and water. Ask the physician or nurse before getting the wound(s) wet in a shower     Daily Wound management:              Keep weight off wounds and reposition every 2 hours. Avoid standing for long periods of time. Apply wraps/stockings in AM and remove at bedtime. If swelling is present, elevate legs to the level of the heart or above for 30 minutes 4-5 times a day and/or when sitting. When taking antibiotics take entire prescription as ordered by physician do not stop taking until medicine is all gone. Wound Care Notes:  Rx: [place] [facility]  Right and Left foot wounds cultured 21  Apply for wound vac 22 will arrive 22  Apply for grafts 22: aLL GRAFTS approved 22 for Left Toe amp site  Santyl ordered 22        Grafts Left Foot Amp Site  Puraply #1 Graft #1 4x4 fenestrated 06/15/22    Puraply #2 Graft #2 4x4 fenestrated 22                                    Orders for this week:  22               FAX ORDERS TO [facility]! Right and Left Plantar, and Left Great toe amp site--Clean with soap and water, pat  dry. Apply anasept gel and stimulen powder to wound bed. Cover with Ca Alginate and Sorbex. Wrap with Coban 2 Lite. Leave in place until Nurse Visit Monday. Nurse Visit Monday  Follow Up Instructions: At the [address] in 1 week:  Primary Wound Care Provider: Dr Nichol Wilkinson  Call  for any questions or concerns.   Central [phone]      Treatment Note Wound 22 #1 (onset 2 weeks) Left Medial Foot Amp Site-Dressing/Treatment:  (anaspet gel, stimylen ca alg sorbex coban 2 lite.)  Wound 22 #2 (onset unknown) Left Plantar-Dressing/Treatment:  (anaspet gel, stimylen ca alg sorbex coban 2 lite.)  Wound

## 2022-08-11 NOTE — PROGRESS NOTES
Multilayer Compression Wrap   (Not Unna) Below the Knee    NAME:  Rosario Luke OF BIRTH:  1980  MEDICAL RECORD NUMBER:  [id number]  DATE:  8/11/2022    Multilayer compression wrap: Removed old Multilayer wrap if indicated and wash leg with mild soap/water. Applied moisturizing agent to dry skin as needed. Applied primary and secondary dressing as ordered. Applied multilayered dressing below the knee to right lower leg. Applied multilayered dressing below the knee to left lower leg. Instructed patient/caregiver not to remove dressing and to keep it clean and dry. Instructed patient/caregiver on complications to report to provider, such as pain, numbness in toes, heavy drainage, and slippage of dressing. Instructed patient on purpose of compression dressing and on activity and exercise recommendations.       Electronically signed by Tammy Huynh LPN on 7/31/9191 at 32:89 AM

## 2022-08-15 ENCOUNTER — HOSPITAL ENCOUNTER (OUTPATIENT)
Dept: WOUND CARE | Age: 42
Discharge: HOME | End: 2022-08-15
Payer: COMMERCIAL

## 2022-08-15 ENCOUNTER — CARE COORDINATION (OUTPATIENT)
Dept: CARE COORDINATION | Age: 42
End: 2022-08-15

## 2022-08-15 VITALS
SYSTOLIC BLOOD PRESSURE: 144 MMHG | RESPIRATION RATE: 20 BRPM | TEMPERATURE: 98 F | DIASTOLIC BLOOD PRESSURE: 84 MMHG | HEART RATE: 92 BPM

## 2022-08-15 DIAGNOSIS — Z89.432 PARTIAL NONTRAUMATIC AMPUTATION OF FOOT, LEFT (HCC): ICD-10-CM

## 2022-08-15 DIAGNOSIS — L97.412 DIABETIC ULCER OF RIGHT MIDFOOT ASSOCIATED WITH TYPE 2 DIABETES MELLITUS, WITH FAT LAYER EXPOSED (HCC): ICD-10-CM

## 2022-08-15 DIAGNOSIS — E11.621 DIABETIC ULCER OF RIGHT MIDFOOT ASSOCIATED WITH TYPE 2 DIABETES MELLITUS, WITH FAT LAYER EXPOSED (HCC): ICD-10-CM

## 2022-08-15 DIAGNOSIS — L03.116 CELLULITIS OF LEFT FOOT: ICD-10-CM

## 2022-08-15 DIAGNOSIS — L97.422 DIABETIC ULCER OF LEFT MIDFOOT ASSOCIATED WITH TYPE 2 DIABETES MELLITUS, WITH FAT LAYER EXPOSED (HCC): Primary | ICD-10-CM

## 2022-08-15 DIAGNOSIS — E11.621 DIABETIC ULCER OF LEFT MIDFOOT ASSOCIATED WITH TYPE 2 DIABETES MELLITUS, WITH FAT LAYER EXPOSED (HCC): Primary | ICD-10-CM

## 2022-08-15 PROCEDURE — 29581 APPL MULTLAYER CMPRN SYS LEG: CPT

## 2022-08-15 RX ORDER — BACITRACIN ZINC AND POLYMYXIN B SULFATE 500; 1000 [USP'U]/G; [USP'U]/G
OINTMENT TOPICAL ONCE
Status: CANCELLED | OUTPATIENT
Start: 2022-08-15 | End: 2022-08-15

## 2022-08-15 RX ORDER — CLOBETASOL PROPIONATE 0.5 MG/G
OINTMENT TOPICAL ONCE
Status: CANCELLED | OUTPATIENT
Start: 2022-08-15 | End: 2022-08-15

## 2022-08-15 RX ORDER — LIDOCAINE 50 MG/G
OINTMENT TOPICAL ONCE
Status: CANCELLED | OUTPATIENT
Start: 2022-08-15 | End: 2022-08-15

## 2022-08-15 RX ORDER — GINSENG 100 MG
CAPSULE ORAL ONCE
Status: CANCELLED | OUTPATIENT
Start: 2022-08-15 | End: 2022-08-15

## 2022-08-15 RX ORDER — BETAMETHASONE DIPROPIONATE 0.05 %
OINTMENT (GRAM) TOPICAL ONCE
Status: CANCELLED | OUTPATIENT
Start: 2022-08-15 | End: 2022-08-15

## 2022-08-15 RX ORDER — ONDANSETRON 4 MG/1
4 TABLET, FILM COATED ORAL 3 TIMES DAILY PRN
Qty: 30 TABLET | Refills: 0 | Status: SHIPPED | OUTPATIENT
Start: 2022-08-15 | End: 2022-08-25

## 2022-08-15 RX ORDER — LIDOCAINE HYDROCHLORIDE 20 MG/ML
JELLY TOPICAL ONCE
Status: CANCELLED | OUTPATIENT
Start: 2022-08-15 | End: 2022-08-15

## 2022-08-15 RX ORDER — GENTAMICIN SULFATE 1 MG/G
OINTMENT TOPICAL ONCE
Status: CANCELLED | OUTPATIENT
Start: 2022-08-15 | End: 2022-08-15

## 2022-08-15 RX ORDER — BACITRACIN, NEOMYCIN, POLYMYXIN B 400; 3.5; 5 [USP'U]/G; MG/G; [USP'U]/G
OINTMENT TOPICAL ONCE
Status: CANCELLED | OUTPATIENT
Start: 2022-08-15 | End: 2022-08-15

## 2022-08-15 RX ORDER — LIDOCAINE HYDROCHLORIDE 40 MG/ML
SOLUTION TOPICAL ONCE
Status: CANCELLED | OUTPATIENT
Start: 2022-08-15 | End: 2022-08-15

## 2022-08-15 RX ORDER — LIDOCAINE 40 MG/G
CREAM TOPICAL ONCE
Status: CANCELLED | OUTPATIENT
Start: 2022-08-15 | End: 2022-08-15

## 2022-08-15 ASSESSMENT — PAIN SCALES - GENERAL: PAINLEVEL_OUTOF10: 0

## 2022-08-15 NOTE — PROGRESS NOTES
Multilayer Compression Wrap   (Not Unna) Below the Knee    NAME:  Asia Langford OF BIRTH:  1980  MEDICAL RECORD NUMBER:  [id number]  DATE:  8/15/2022    Multilayer compression wrap: Removed old Multilayer wrap if indicated and wash leg with mild soap/water. Applied moisturizing agent to dry skin as needed. Applied primary and secondary dressing as ordered. Applied multilayered dressing below the knee to right lower leg. Applied multilayered dressing below the knee to left lower leg. Instructed patient/caregiver not to remove dressing and to keep it clean and dry. Instructed patient/caregiver on complications to report to provider, such as pain, numbness in toes, heavy drainage, and slippage of dressing. Instructed patient on purpose of compression dressing and on activity and exercise recommendations.       Electronically signed by Linda Wiggins RN on 8/15/2022 at 10:04 AM

## 2022-08-16 ENCOUNTER — TELEPHONE (OUTPATIENT)
Dept: FAMILY MEDICINE CLINIC | Age: 42
End: 2022-08-16

## 2022-08-16 NOTE — TELEPHONE ENCOUNTER
Received a call from [address] [place] [address],[address]. Patient has been vomiting since Friday not able to keep food down and very little liquids. Some Dizziness with headache, BS running in the 300's. No diarrhea, some abdominal pain all over. Spoke with Lorette Hashimoto and patient advised to go straight to ER.

## 2022-08-18 ENCOUNTER — TELEPHONE (OUTPATIENT)
Dept: FAMILY MEDICINE CLINIC | Age: 42
End: 2022-08-18

## 2022-08-18 ENCOUNTER — OFFICE VISIT (OUTPATIENT)
Dept: FAMILY MEDICINE CLINIC | Age: 42
End: 2022-08-18
Payer: COMMERCIAL

## 2022-08-18 VITALS
OXYGEN SATURATION: 100 % | HEART RATE: 98 BPM | SYSTOLIC BLOOD PRESSURE: 140 MMHG | DIASTOLIC BLOOD PRESSURE: 92 MMHG | BODY MASS INDEX: 25.06 KG/M2 | WEIGHT: 185 LBS | HEIGHT: 72 IN

## 2022-08-18 DIAGNOSIS — E11.40 TYPE 2 DIABETES MELLITUS WITH DIABETIC NEUROPATHY, WITHOUT LONG-TERM CURRENT USE OF INSULIN (HCC): ICD-10-CM

## 2022-08-18 DIAGNOSIS — I10 ESSENTIAL HYPERTENSION: ICD-10-CM

## 2022-08-18 DIAGNOSIS — R10.13 EPIGASTRIC PAIN: ICD-10-CM

## 2022-08-18 DIAGNOSIS — F33.2 SEVERE EPISODE OF RECURRENT MAJOR DEPRESSIVE DISORDER, WITHOUT PSYCHOTIC FEATURES (HCC): ICD-10-CM

## 2022-08-18 DIAGNOSIS — R11.14 BILIOUS VOMITING WITH NAUSEA: ICD-10-CM

## 2022-08-18 DIAGNOSIS — R10.13 EPIGASTRIC PAIN: Primary | ICD-10-CM

## 2022-08-18 LAB
BASOPHILS ABSOLUTE: 0 K/UL (ref 0–0.2)
BASOPHILS RELATIVE PERCENT: 0.6 %
CHP ED QC CHECK: ABNORMAL
EOSINOPHILS ABSOLUTE: 0.2 K/UL (ref 0–0.6)
EOSINOPHILS RELATIVE PERCENT: 1.9 %
GLUCOSE BLD-MCNC: 329 MG/DL
HCT VFR BLD CALC: 39 % (ref 40.5–52.5)
HEMOGLOBIN: 13 G/DL (ref 13.5–17.5)
LYMPHOCYTES ABSOLUTE: 1.3 K/UL (ref 1–5.1)
LYMPHOCYTES RELATIVE PERCENT: 15.7 %
MCH RBC QN AUTO: 29.7 PG (ref 26–34)
MCHC RBC AUTO-ENTMCNC: 33.3 G/DL (ref 31–36)
MCV RBC AUTO: 89.2 FL (ref 80–100)
MONOCYTES ABSOLUTE: 0.7 K/UL (ref 0–1.3)
MONOCYTES RELATIVE PERCENT: 8.6 %
NEUTROPHILS ABSOLUTE: 5.9 K/UL (ref 1.7–7.7)
NEUTROPHILS RELATIVE PERCENT: 73.2 %
PDW BLD-RTO: 15.2 % (ref 12.4–15.4)
PLATELET # BLD: 534 K/UL (ref 135–450)
PMV BLD AUTO: 7 FL (ref 5–10.5)
RBC # BLD: 4.37 M/UL (ref 4.2–5.9)
WBC # BLD: 8.1 K/UL (ref 4–11)

## 2022-08-18 PROCEDURE — 2022F DILAT RTA XM EVC RTNOPTHY: CPT

## 2022-08-18 PROCEDURE — 82962 GLUCOSE BLOOD TEST: CPT

## 2022-08-18 PROCEDURE — 3046F HEMOGLOBIN A1C LEVEL >9.0%: CPT

## 2022-08-18 PROCEDURE — 99215 OFFICE O/P EST HI 40 MIN: CPT

## 2022-08-18 PROCEDURE — G8427 DOCREV CUR MEDS BY ELIG CLIN: HCPCS

## 2022-08-18 PROCEDURE — 1036F TOBACCO NON-USER: CPT

## 2022-08-18 PROCEDURE — G8417 CALC BMI ABV UP PARAM F/U: HCPCS

## 2022-08-18 RX ORDER — SUCRALFATE ORAL 1 G/10ML
1 SUSPENSION ORAL 4 TIMES DAILY
Qty: 414 ML | Refills: 0 | Status: SHIPPED | OUTPATIENT
Start: 2022-08-18 | End: 2022-09-26

## 2022-08-18 ASSESSMENT — PATIENT HEALTH QUESTIONNAIRE - PHQ9
SUM OF ALL RESPONSES TO PHQ9 QUESTIONS 1 & 2: 6
7. TROUBLE CONCENTRATING ON THINGS, SUCH AS READING THE NEWSPAPER OR WATCHING TELEVISION: 3
8. MOVING OR SPEAKING SO SLOWLY THAT OTHER PEOPLE COULD HAVE NOTICED. OR THE OPPOSITE, BEING SO FIGETY OR RESTLESS THAT YOU HAVE BEEN MOVING AROUND A LOT MORE THAN USUAL: 0
9. THOUGHTS THAT YOU WOULD BE BETTER OFF DEAD, OR OF HURTING YOURSELF: 3
SUM OF ALL RESPONSES TO PHQ QUESTIONS 1-9: 24
1. LITTLE INTEREST OR PLEASURE IN DOING THINGS: 3
5. POOR APPETITE OR OVEREATING: 3
3. TROUBLE FALLING OR STAYING ASLEEP: 3
2. FEELING DOWN, DEPRESSED OR HOPELESS: 3
SUM OF ALL RESPONSES TO PHQ QUESTIONS 1-9: 24
4. FEELING TIRED OR HAVING LITTLE ENERGY: 3
SUM OF ALL RESPONSES TO PHQ QUESTIONS 1-9: 21
SUM OF ALL RESPONSES TO PHQ QUESTIONS 1-9: 24
10. IF YOU CHECKED OFF ANY PROBLEMS, HOW DIFFICULT HAVE THESE PROBLEMS MADE IT FOR YOU TO DO YOUR WORK, TAKE CARE OF THINGS AT HOME, OR GET ALONG WITH OTHER PEOPLE: 3
6. FEELING BAD ABOUT YOURSELF - OR THAT YOU ARE A FAILURE OR HAVE LET YOURSELF OR YOUR FAMILY DOWN: 3

## 2022-08-18 ASSESSMENT — ENCOUNTER SYMPTOMS
ABDOMINAL PAIN: 1
BLOOD IN STOOL: 0
DIARRHEA: 0
NAUSEA: 1
VOMITING: 1

## 2022-08-18 ASSESSMENT — COLUMBIA-SUICIDE SEVERITY RATING SCALE - C-SSRS
6. HAVE YOU EVER DONE ANYTHING, STARTED TO DO ANYTHING, OR PREPARED TO DO ANYTHING TO END YOUR LIFE?: NO
3. HAVE YOU BEEN THINKING ABOUT HOW YOU MIGHT KILL YOURSELF?: NO
1. WITHIN THE PAST MONTH, HAVE YOU WISHED YOU WERE DEAD OR WISHED YOU COULD GO TO SLEEP AND NOT WAKE UP?: YES
4. HAVE YOU HAD THESE THOUGHTS AND HAD SOME INTENTION OF ACTING ON THEM?: NO
5. HAVE YOU STARTED TO WORK OUT OR WORKED OUT THE DETAILS OF HOW TO KILL YOURSELF? DO YOU INTEND TO CARRY OUT THIS PLAN?: NO
2. HAVE YOU ACTUALLY HAD ANY THOUGHTS OF KILLING YOURSELF?: YES

## 2022-08-18 NOTE — TELEPHONE ENCOUNTER
Re:     sucralfate (CARAFATE) 1 GM/10ML suspension     The amount called in is for 10 days, not 7 days. Please advise.

## 2022-08-18 NOTE — PATIENT INSTRUCTIONS
[facility] - MD [address]  [facility], [address] [facility]  [phone]

## 2022-08-18 NOTE — TELEPHONE ENCOUNTER
Patient returning call, unsure who called. No msg left on patients VM.  Patients phone showed a missed call from this office

## 2022-08-18 NOTE — TELEPHONE ENCOUNTER
Spoke with pt , asked how he was feeling in re to the suicidal thoughts. Pt states he is alone should probably go sit outside but feels as if he is being watched. Gave pt the info for the suicide hotline advised he can text [employer] or go to [email or web address]. pt voiced understanding    Informed pt sucralfate sent to the pharmacy.
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
## 2022-08-19 ENCOUNTER — TELEPHONE (OUTPATIENT)
Dept: FAMILY MEDICINE CLINIC | Age: 42
End: 2022-08-19

## 2022-08-19 NOTE — TELEPHONE ENCOUNTER
Pt was having suicidal thoughts last office visit. Left message for pt to let us know how he doing/feeling.

## 2022-08-22 ENCOUNTER — CARE COORDINATION (OUTPATIENT)
Dept: CARE COORDINATION | Age: 42
End: 2022-08-22

## 2022-08-22 ENCOUNTER — HOSPITAL ENCOUNTER (OUTPATIENT)
Dept: WOUND CARE | Age: 42
Discharge: HOME | End: 2022-08-22
Payer: COMMERCIAL

## 2022-08-22 ENCOUNTER — TELEPHONE (OUTPATIENT)
Dept: FAMILY MEDICINE CLINIC | Age: 42
End: 2022-08-22

## 2022-08-22 VITALS
RESPIRATION RATE: 16 BRPM | DIASTOLIC BLOOD PRESSURE: 89 MMHG | HEART RATE: 93 BPM | TEMPERATURE: 97.4 F | SYSTOLIC BLOOD PRESSURE: 156 MMHG

## 2022-08-22 DIAGNOSIS — L97.422 DIABETIC ULCER OF LEFT MIDFOOT ASSOCIATED WITH TYPE 2 DIABETES MELLITUS, WITH FAT LAYER EXPOSED (HCC): Primary | ICD-10-CM

## 2022-08-22 DIAGNOSIS — E11.621 DIABETIC ULCER OF RIGHT MIDFOOT ASSOCIATED WITH TYPE 2 DIABETES MELLITUS, WITH FAT LAYER EXPOSED (HCC): ICD-10-CM

## 2022-08-22 DIAGNOSIS — L97.412 DIABETIC ULCER OF RIGHT MIDFOOT ASSOCIATED WITH TYPE 2 DIABETES MELLITUS, WITH FAT LAYER EXPOSED (HCC): ICD-10-CM

## 2022-08-22 DIAGNOSIS — L03.116 CELLULITIS OF LEFT FOOT: ICD-10-CM

## 2022-08-22 DIAGNOSIS — E11.621 DIABETIC ULCER OF LEFT MIDFOOT ASSOCIATED WITH TYPE 2 DIABETES MELLITUS, WITH FAT LAYER EXPOSED (HCC): Primary | ICD-10-CM

## 2022-08-22 DIAGNOSIS — Z89.432 PARTIAL NONTRAUMATIC AMPUTATION OF FOOT, LEFT (HCC): ICD-10-CM

## 2022-08-22 PROCEDURE — 11042 DBRDMT SUBQ TIS 1ST 20SQCM/<: CPT

## 2022-08-22 PROCEDURE — 11042 DBRDMT SUBQ TIS 1ST 20SQCM/<: CPT | Performed by: NURSE PRACTITIONER

## 2022-08-22 RX ORDER — BACITRACIN, NEOMYCIN, POLYMYXIN B 400; 3.5; 5 [USP'U]/G; MG/G; [USP'U]/G
OINTMENT TOPICAL ONCE
Status: CANCELLED | OUTPATIENT
Start: 2022-08-22 | End: 2022-08-22

## 2022-08-22 RX ORDER — GENTAMICIN SULFATE 1 MG/G
OINTMENT TOPICAL ONCE
Status: CANCELLED | OUTPATIENT
Start: 2022-08-22 | End: 2022-08-22

## 2022-08-22 RX ORDER — BETAMETHASONE DIPROPIONATE 0.05 %
OINTMENT (GRAM) TOPICAL ONCE
Status: CANCELLED | OUTPATIENT
Start: 2022-08-22 | End: 2022-08-22

## 2022-08-22 RX ORDER — LIDOCAINE 40 MG/G
CREAM TOPICAL ONCE
Status: CANCELLED | OUTPATIENT
Start: 2022-08-22 | End: 2022-08-22

## 2022-08-22 RX ORDER — CLOBETASOL PROPIONATE 0.5 MG/G
OINTMENT TOPICAL ONCE
Status: CANCELLED | OUTPATIENT
Start: 2022-08-22 | End: 2022-08-22

## 2022-08-22 RX ORDER — LIDOCAINE HYDROCHLORIDE 20 MG/ML
JELLY TOPICAL ONCE
Status: CANCELLED | OUTPATIENT
Start: 2022-08-22 | End: 2022-08-22

## 2022-08-22 RX ORDER — GINSENG 100 MG
CAPSULE ORAL ONCE
Status: CANCELLED | OUTPATIENT
Start: 2022-08-22 | End: 2022-08-22

## 2022-08-22 RX ORDER — BACITRACIN ZINC AND POLYMYXIN B SULFATE 500; 1000 [USP'U]/G; [USP'U]/G
OINTMENT TOPICAL ONCE
Status: CANCELLED | OUTPATIENT
Start: 2022-08-22 | End: 2022-08-22

## 2022-08-22 RX ORDER — LIDOCAINE HYDROCHLORIDE 40 MG/ML
SOLUTION TOPICAL ONCE
Status: CANCELLED | OUTPATIENT
Start: 2022-08-22 | End: 2022-08-22

## 2022-08-22 RX ORDER — LIDOCAINE 50 MG/G
OINTMENT TOPICAL ONCE
Status: CANCELLED | OUTPATIENT
Start: 2022-08-22 | End: 2022-08-22

## 2022-08-22 NOTE — PROGRESS NOTES
[address] [facility] Progress Note With Procedure    Noemi Lloyd  AGE: 39 y.o. GENDER: male  : 1980  EPISODE DATE:  2022     Subjective:     Chief Complaint   Patient presents with    Wound Check     Bilateral feet         HISTORY of PRESENT ILLNESS      Noemi Lloyd is a 39 y.o. male who presents today for wound evaluation of Chronic diabetic, pressure and non-healing surgical ulcer(s) of the left medial foot and lateral plantar surface. The ulcer is of marked severity. The underlying cause of the wound is diabetes, non-healing surgical. He presents today with a new wound to the right plantar foot that is diabetic and pressure in nature and of moderate severity. The patient has significant underlying medical conditions as below. The patient is having significant depression related to the recent and sudden death of his mother. He was last here to the wound clinic 22. He does have some redness to the dorsal left foot will treat for cellulitis. Wound Pain Timing/Severity: none  Quality of pain: N/A  Severity of pain:  0 / 10   Modifying Factors: diabetes and chronic pressure  Associated Signs/Symptoms: drainage     Diabetes: Yes, on an oral and insulin regimen, last A1c 10.4 as of 22  Diabetes education provided today:    Diabetes pathoetiology, difference between type 1 and type 2 diabetes, and progressive nature of Type 2 DM. Diabetic Neuropathy: signs and therapy. Foot care: advised to wash feet daily, pat dry and apply lotion at night, avoiding between toes. Need to look at feet daily and report to a physician any signs of inflammation or skin damage. Discussed diabetes shoes and socks.   Diabetic management related to wound care    Smoking: Never smoker  Obesity:No  Anticoagulant therapy: No  Immunosuppression: No    Patient educated on the 6 essential components necessary for wound healing: Circulation, Debridements, Proper Dressings and Topical Wound Products, Infection Control, Edema Control and Offloading. Patient educated on those factors that negatively effect or impact wound healing: smoking, obesity, uncontrolled diabetes, anticoagulant and immunosuppressive regimens, inadequate nutrition, untreated arterial and venous disease if applicable and measures to manage edema. Nutritional status: well nourished. Discussed need for increased protein and calories for wound healing and good sources of protein (just over 7 grams for every 20 pounds of body weight). Animal-based foods high in protein (meat, poultry, fish, eggs, and dairy foods). Plant based foods high in protein (tofu, lentils, beans, chickpeas, nuts, quinoa and den seeds. Off Loading  Offloading or minimizing or removing weight placed on an area with poor circulation such as diabetic wounds or pressure. This can be achieved with crutches, wheel chair, knee walker etc. Minimizing pressure through partial weight bearing (minimizing the amount of  pressure applied and or the amount of time on the area of pressure) or maintaining a non-weight bearing status can be used to promote and often can be essential for thee wound to heal. Off loading may also need to be achieved for non-weight bearing wounds such as pressure ulcers to the torso. Turning and changing positions frequently, at least every two hours. Use of pressure cushion if sitting up in chair. Skin Care  Keep skin clean and well moisturized , moisturize routinely with ointments for heavier moisturizer needs for extremely dry skin or cracks such as A&D ointment and lotions for a light moisturizer such as CeraVe or Eucerin. If incontinent change incontinence garments as soon as soiled and keeping skin clean and use barrier cream to protect the skin.         PAST MEDICAL HISTORY        Diagnosis Date    Abscess of left foot 4/22/2022    Anemia associated with acute blood loss 4/26/2022    Callus of foot     Right foot - see's Dr. Barrios Members    Cellulitis of left foot 4/22/2022    Diabetic ulcer of left midfoot associated with type 2 diabetes mellitus, with muscle involvement without evidence of necrosis ([address] 4/26/2022    Diabetic ulcer of left midfoot associated with type 2 diabetes mellitus, with necrosis of muscle ([address] 6/7/2021    Diabetic ulcer of right midfoot associated with type 2 diabetes mellitus, with fat layer exposed ([address] 8/11/2020    Dizziness     positional    Essential hypertension     Follows with PCP    Hyperlipidemia     Lumbar radiculopathy     Septic embolism ([address] 6/13/2020    Subacute osteomyelitis of left foot ([address] 4/22/2022       PAST SURGICAL HISTORY    Past Surgical History:   Procedure Laterality Date    ACHILLES TENDON SURGERY Right 1/8/2021    RIGHT ACHILLES TENDON LENGTHENING REPAIR performed by Deion Keane DPM at [address]  03/2018    [address] [place] [address] 51    DENTAL SURGERY      teeth extractions -half per patient    HERNIA REPAIR Bilateral 5/27/2020    BIALTERAL HERNIA INGUINAL REPAIR performed by Caesar Isidro MD at [address] [facility]  2018    [place] OFFICE/OUTPT VISIT,PROCEDURE ONLY Left 4/17/2018    L5-S1 HEMILAMINECTOMY, REMOVAL OF DISC LEFT SIDE performed by Jenn Espinoza MD at [address] Right 1/8/2021    RIGHT TRANSMETATARSAL TOE AMPUTATION performed by Deion Keane DPM at [address] Left 4/23/2022    LEFT RAY GREAT TOE AMPUTATION performed by Christopher Lyons MD at [address]    Family History   Problem Relation Age of Onset    Heart Disease Father     Diabetes Father     Diabetes Mother     Heart Disease Mother     Other Mother     Kidney Disease Mother     Heart Attack Mother        SOCIAL HISTORY    Social History     Tobacco Use    Smoking status: Never    Smokeless tobacco: Never   Vaping Use    Vaping Use: Never used   Substance Use Topics    Alcohol use: Yes     Comment: \"couple beers a night\" Drug use: No       ALLERGIES    No Known Allergies    MEDICATIONS    Current Outpatient Medications on File Prior to Encounter   Medication Sig Dispense Refill    sucralfate (CARAFATE) 1 GM/10ML suspension Take 10 mLs by mouth 4 times daily for 7 days 414 mL 0    ondansetron (ZOFRAN) 4 MG tablet Take 1 tablet by mouth 3 times daily as needed for Nausea or Vomiting 30 tablet 0    sulfamethoxazole-trimethoprim (BACTRIM DS;SEPTRA DS) 800-160 MG per tablet Take 1 tablet by mouth in the morning and 1 tablet before bedtime. Do all this for 14 days. 28 tablet 0    cefUROXime (CEFTIN) 500 MG tablet Take 1 tablet by mouth in the morning and 1 tablet before bedtime. Do all this for 14 days. (Patient not taking: No sig reported) 28 tablet 0    glyBURIDE (DIABETA) 5 MG tablet Take 2 tablets by mouth in the morning and at bedtime 120 tablet 1    Dulaglutide 1.5 MG/0.5ML SOPN Inject 1.5 mg into the skin once a week 5 pen 3    metFORMIN (GLUCOPHAGE) 500 MG tablet Take 2 tablets by mouth in the morning and 2 tablets in the evening. Take with meals. Indications: Start tomorrow 03/14. 360 tablet 1    omeprazole (PRILOSEC) 20 MG delayed release capsule Take 1 capsule by mouth once daily in the morning 90 capsule 1    metoprolol succinate (TOPROL XL) 25 MG extended release tablet Take 1 tablet by mouth in the morning. 90 tablet 1    losartan (COZAAR) 25 MG tablet Take 1 tablet by mouth in the morning. 90 tablet 1    FLUoxetine (PROZAC) 40 MG capsule Take 2 capsules by mouth in the morning.  30 capsule 3    insulin glargine (LANTUS SOLOSTAR) 100 UNIT/ML injection pen Inject 30 Units into the skin nightly 5 pen 2    collagenase 250 UNIT/GM ointment Per instructions DAILY (route: topical)      Alcohol Swabs PADS       pioglitazone (ACTOS) 30 MG tablet Take 1 tablet by mouth daily 90 tablet 1    atorvastatin (LIPITOR) 40 MG tablet Take 1 tablet by mouth nightly 90 tablet 1    blood glucose monitor strips Test 2 times a day & as needed for symptoms of irregular blood glucose. Dispense sufficient amount for indicated testing frequency plus additional to accommodate PRN testing needs. 100 strip 0    blood glucose monitor kit and supplies Dispense sufficient amount for indicated testing frequency plus additional to accommodate PRN testing needs. Dispense all needed supplies to include: monitor, strips, lancing device, lancets, control solutions, alcohol swabs. 1 kit 0    FreeStyle Lancets MISC 1 each by Does not apply route daily 100 each 3    acetaminophen (TYLENOL) 325 MG tablet Take 2 tablets by mouth every 4 hours as needed for Pain or Fever 120 tablet 3     No current facility-administered medications on file prior to encounter. REVIEW OF SYSTEMS    Pertinent items are noted in HPI. Constitutional: Negative for systemic symptoms including fever, chills and malaise. Objective:      BP (!) 156/89   Pulse 93   Temp 97.4 °F (36.3 °C) (Temporal)   Resp 16     PHYSICAL EXAM      General: The patient is in no acute distress. Mental status:  Patient is appropriate, is  oriented to place and plan of care.   Dermatologic exam: Visual inspection of the periwound reveals the skin to be normal in turgor and texture  Wound exam: see wound description below in procedure note      Assessment:     Problem List Items Addressed This Visit          Endocrine    WD-Diabetic ulcer of left midfoot associated with type 2 diabetes mellitus, with fat layer exposed ([address] - Primary    Relevant Orders    Initiate Outpatient Wound Care Protocol    WD-Diabetic ulcer of right midfoot associated with type 2 diabetes mellitus, with fat layer exposed ([address]    Relevant Orders    Initiate Outpatient Wound Care Protocol       Other    Cellulitis of left foot    Relevant Orders    Initiate Outpatient Wound Care Protocol    WD-Partial nontraumatic amputation of foot, left [facility])    Relevant Orders    Initiate Outpatient Wound Care Protocol       Procedure Note    Indications:  Based on my examination of this patient's wound(s) today, sharp excision into necrotic subcutaneous tissue is required to promote healing and evaluate the extent of previous healing. Performed by: RAFAEL Cardona CNP    Consent obtained: Yes    Time out taken:  Yes    Pain Control: Anesthetic  Anesthetic: 4% Lidocaine Liquid Topical      Debridement:Excisional Debridement    Using curette and tissue nippers the wound(s) was/were sharply debrided down through and including the removal of subcutaneous tissue. Devitalized Tissue Debrided:  slough, exudate and callus    Pre Debridement Measurements:  Are located in the Wound Documentation Flow Sheet    All active wounds listed below with today's date are evaluated  Wound(s)    debrided this date include # : 1, 2 & 3    Post  Debridement Measurements:  Wound 05/11/22 #1 (onset 2 weeks) Left Medial Foot Amp Site (Active)   Wound Image   08/11/22 1007   Wound Etiology Surgical 08/22/22 1127   Dressing Status New dressing applied 08/22/22 1127   Wound Cleansed Wound cleanser 08/22/22 1035   Offloading for Diabetic Foot Ulcers Diabetic shoes/inserts 08/22/22 1127   Wound Length (cm) 4.8 cm 08/22/22 1035   Wound Width (cm) 1.2 cm 08/22/22 1035   Wound Depth (cm) 0.4 cm 08/22/22 1035   Wound Surface Area (cm^2) 5.76 cm^2 08/22/22 1035   Change in Wound Size % (l*w) 88.8 08/22/22 1035   Wound Volume (cm^3) 2. 304 cm^3 08/22/22 1035   Wound Healing % 91 08/22/22 1035   Post-Procedure Length (cm) 4.8 cm 08/22/22 1104   Post-Procedure Width (cm) 1.2 cm 08/22/22 1104   Post-Procedure Depth (cm) 0.4 cm 08/22/22 1104   Post-Procedure Surface Area (cm^2) 5.76 cm^2 08/22/22 1104   Post-Procedure Volume (cm^3) 2. 304 cm^3 08/22/22 1104   Distance Tunneling (cm) 0 cm 08/22/22 1035   Tunneling Position ___ O'Clock 0 08/22/22 1035   Undermining Starts ___ O'Clock 1200 08/22/22 1035   Undermining Ends___ O'Clock 0900 08/22/22 1035   Undermining Maxium Distance (cm) 0.5 08/22/22 1035   Wound Assessment Pink/red;Granulation tissue;Slough 08/22/22 1035   Drainage Amount Large 08/22/22 1035   Drainage Description Serosanguinous; Yellow 08/22/22 1035   Odor None 08/22/22 1035   Rosalie-wound Assessment Hyperkeratosis (callous) 08/22/22 1035   Margins Defined edges 08/22/22 1035   Wound Thickness Description not for Pressure Injury Full thickness 08/22/22 1035   Number of days: 103       Wound 05/11/22 #2 (onset unknown) Left Plantar (Active)   Wound Image   08/11/22 1007   Wound Etiology Diabetic 08/22/22 1127   Dressing Status New dressing applied 08/22/22 1127   Wound Cleansed Wound cleanser 08/22/22 1035   Offloading for Diabetic Foot Ulcers Diabetic shoes/inserts 08/22/22 1127   Wound Length (cm) 1.8 cm 08/22/22 1035   Wound Width (cm) 1.6 cm 08/22/22 1035   Wound Depth (cm) 0.3 cm 08/22/22 1035   Wound Surface Area (cm^2) 2.88 cm^2 08/22/22 1035   Change in Wound Size % (l*w) -80 08/22/22 1035   Wound Volume (cm^3) 0.864 cm^3 08/22/22 1035   Wound Healing % -170 08/22/22 1035   Post-Procedure Length (cm) 1.8 cm 08/22/22 1104   Post-Procedure Width (cm) 1.6 cm 08/22/22 1104   Post-Procedure Depth (cm) 0.3 cm 08/22/22 1104   Post-Procedure Surface Area (cm^2) 2.88 cm^2 08/22/22 1104   Post-Procedure Volume (cm^3) 0.864 cm^3 08/22/22 1104   Distance Tunneling (cm) 0 cm 08/22/22 1035   Tunneling Position ___ O'Clock 0 08/22/22 1035   Undermining Starts ___ O'Clock 1200 08/22/22 1035   Undermining Ends___ O'Clock 1200 08/22/22 1035   Undermining Maxium Distance (cm) 0.2 08/22/22 1035   Wound Assessment [place]/red;Slough 08/22/22 1035   Drainage Amount Large 08/22/22 1035   Drainage Description Serosanguinous; Yellow 08/22/22 1035   Odor None 08/22/22 1035   Roslaie-wound Assessment Hyperkeratosis (callous) 08/22/22 1035   Margins Defined edges 08/22/22 1035   Wound Thickness Description not for Pressure Injury Full thickness 08/22/22 1035   Number of days: 103       Wound left foot improved with Bactrim and Ceftin. The right plantar foot able to probe to bone will use stimulin and pack with ioplex. Off loading with quick around the periwounds along with compression wrap. The patient continues to orellana depression post his mother's death. Advanced Modality Checklist: Skin substitutes    [x] Yes []  No  Is the wound Greater than 1.0 sq cm  [x] Yes []  No  Has the wound had Documented Treatment for 30 days ? [] Yes [x]  No  Recurrent Wound with Skin Substitute with Last Year?  [] Yes [x]  No  Radiographic testing in the last 3 months? [] Yes [x]  No  Vascular Assessment in the last 6 months? [x] Yes []  No  Smoking Status  [x] Yes []  No  Nutrition Assessed  [x] Yes []  No  Wound Free from infection   [x] Yes []  No  Is wound free of eschar, slough, and/or Bio Wittman? [] Yes [x]  No  Malignant Process in Wound  [] Yes []  No  Hemoglobin A1C in the last 3 months?  last A1c 10.4 as of 4/26/22  [x] Yes []  No  Off loading Diabetic Foot Ulcer? [x] Yes []  No Compression Therapy of 20 mmHg or Greater? Plan:       Discharge Instructions         PHYSICIAN ORDERS AND DISCHARGE INSTRUCTIONS     NOTE: Upon discharge from the [address], you will receive a patient experience survey. We would be grateful if you would take the time to fill this survey out. Wound cleansing:              Do not scrub or use excessive force. Wash hands with soap and water before and after dressing changes. Prior to applying a clean dressing, cleanse wound with normal saline, wound cleanser, or mild soap and water. Ask the physician or nurse before getting the wound(s) wet in a shower     Daily Wound management:              Keep weight off wounds and reposition every 2 hours. Avoid standing for long periods of time. Apply wraps/stockings in AM and remove at bedtime.               If swelling is present, elevate legs to the level of the heart or above for 30 minutes 4-5 times a day and/or when sitting. When taking antibiotics take entire prescription as ordered by physician do not stop taking until medicine is all gone. Wound Care Notes:  Rx: Hellen Light  Apply for grafts 22: aLL GRAFTS approved 22 for Left Toe amp site  Reapply for graft 22 for Left toes amp site   YADY's   Right 1.07      Left    1.1           Date: 08/15/22         Grafts Left Foot Amp Site  Puraply #1 Graft #1 4x4 fenestrated 06/15/22   Puraply #2 Graft #2 4x4 fenestrated 22                                    Orders for this week:  22           Right and Left Plantar, and Left Great toe amp site--Clean with soap and water, pat  dry. Apply anasept gel and stimulen powder to wound bed. Cover with Ca Alginate and Sorbex. Wrap with Coban 2 Lite. Leave in place until nurse visit Thursday           Nurse Visit Thursday   Follow Up Instructions: At the [address] in 1 week:  Primary Wound Care Provider: Winifred Howard CNP   Call  for any questions or concerns.   Central [phone]        Treatment Note Wound 22 #1 (onset 2 weeks) Left Medial Foot Amp Site-Dressing/Treatment:  (anasept,stimulen,sorbex,coban2 lite)  Wound 22 #2 (onset unknown) Left Plantar-Dressing/Treatment:  (anasept,stimulen,sorbex,coban2 lite)  Wound 22 #3 (onset unknown) Right Plantar-Dressing/Treatment:  (anasept,stimulen,sorbex,coban2 lite)    Written Patient Dismissal Instructions Given            Electronically signed by RAFAEL Velasquez CNP on 2022 at 11:55 AM

## 2022-08-22 NOTE — CARE COORDINATION
Phone call to Che Fuchs to follow up regarding community resources / housing. Che Fuchs reported he received resources in mail, stated housing is main goal.   Che Fuchs reported he spoke with brother who paid off the house for him. Stated if he can get the title put in his name, he might be able to remain in the home. Waiting for title to come in the mail. Going to put the house in his brother's name. Will then decide if they will sell it, considering AL placement. SW did provide education on the AL waiver. Pt requested some time to think about what he is going to do, requested this SW to call him back in about 1-2 weeks. SW discussed plans to contact Pt weekly to offer support as needed, also encouraged Pt to call this SW with any questions. Pt was in agreement. KOLE plan of care: KOLE will follow up with Pt in one week on 8/29.

## 2022-08-22 NOTE — CARE COORDINATION
Phone call to Chuckie Kussmaul who stated he was at his wound care appointment, requesting this SW call back in about an hour.

## 2022-08-22 NOTE — PROGRESS NOTES
Multilayer Compression Wrap   (Not Unna) Below the Knee    NAME:  Meghan Herrera OF BIRTH:  1980  MEDICAL RECORD NUMBER:  [id number]  DATE:  8/22/2022    Multilayer compression wrap: Removed old Multilayer wrap if indicated and wash leg with mild soap/water. Applied moisturizing agent to dry skin as needed. Applied primary and secondary dressing as ordered. Applied multilayered dressing below the knee to right lower leg. Applied multilayered dressing below the knee to left lower leg. Instructed patient/caregiver not to remove dressing and to keep it clean and dry. Instructed patient/caregiver on complications to report to provider, such as pain, numbness in toes, heavy drainage, and slippage of dressing. Instructed patient on purpose of compression dressing and on activity and exercise recommendations.       Electronically signed by Onalee Cranker, LPN on 0/34/4947 at 39:15 AM

## 2022-08-22 NOTE — DISCHARGE INSTRUCTIONS
PHYSICIAN ORDERS AND DISCHARGE INSTRUCTIONS     NOTE: Upon discharge from the [address], you will receive a patient experience survey. We would be grateful if you would take the time to fill this survey out. Wound cleansing:              Do not scrub or use excessive force. Wash hands with soap and water before and after dressing changes. Prior to applying a clean dressing, cleanse wound with normal saline, wound cleanser, or mild soap and water. Ask the physician or nurse before getting the wound(s) wet in a shower     Daily Wound management:              Keep weight off wounds and reposition every 2 hours. Avoid standing for long periods of time. Apply wraps/stockings in AM and remove at bedtime. If swelling is present, elevate legs to the level of the heart or above for 30 minutes 4-5 times a day and/or when sitting. When taking antibiotics take entire prescription as ordered by physician do not stop taking until medicine is all gone. Wound Care Notes:  Rx: Miguel Robles  Apply for grafts 05/11/22: aLL GRAFTS approved 06/08/22 for Left Toe amp site  Reapply for graft 08/22/22 for Left toes amp site   YADY's   Right 1.07      Left    1.1           Date: 08/15/22         Grafts Left Foot Amp Site  Puraply #1 Graft #1 4x4 fenestrated 06/15/22   Puraply #2 Graft #2 4x4 fenestrated 06/22/22                                    Orders for this week:  08/22/22           Right and Left Plantar, and Left Great toe amp site--Clean with soap and water, pat  dry. Apply anasept gel and stimulen powder to wound bed. Cover with Ca Alginate and Sorbex. Wrap with Coban 2 Lite. Leave in place until nurse visit Thursday           Nurse Visit Thursday   Follow Up Instructions:  At the [address] in 1 week:  Primary Wound Care Provider: Steve Vazquez CNP   Call [phone] for any questions or concerns.   Central [phone]

## 2022-08-25 ENCOUNTER — CARE COORDINATION (OUTPATIENT)
Dept: CARE COORDINATION | Age: 42
End: 2022-08-25

## 2022-08-25 ENCOUNTER — HOSPITAL ENCOUNTER (OUTPATIENT)
Dept: WOUND CARE | Age: 42
Discharge: HOME | End: 2022-08-25
Payer: COMMERCIAL

## 2022-08-25 VITALS
RESPIRATION RATE: 16 BRPM | HEART RATE: 92 BPM | TEMPERATURE: 97.7 F | SYSTOLIC BLOOD PRESSURE: 154 MMHG | DIASTOLIC BLOOD PRESSURE: 87 MMHG

## 2022-08-25 PROCEDURE — 29581 APPL MULTLAYER CMPRN SYS LEG: CPT

## 2022-08-25 ASSESSMENT — PAIN DESCRIPTION - ONSET: ONSET: ON-GOING

## 2022-08-25 ASSESSMENT — PAIN DESCRIPTION - PAIN TYPE: TYPE: CHRONIC PAIN

## 2022-08-25 ASSESSMENT — PAIN DESCRIPTION - FREQUENCY: FREQUENCY: INTERMITTENT

## 2022-08-25 ASSESSMENT — PAIN DESCRIPTION - DESCRIPTORS: DESCRIPTORS: SHARP

## 2022-08-25 ASSESSMENT — PAIN SCALES - GENERAL: PAINLEVEL_OUTOF10: 5

## 2022-08-25 ASSESSMENT — PAIN DESCRIPTION - ORIENTATION: ORIENTATION: RIGHT;LEFT

## 2022-08-25 ASSESSMENT — PAIN DESCRIPTION - LOCATION: LOCATION: FOOT

## 2022-08-25 NOTE — PROGRESS NOTES
Multilayer Compression Wrap   (Not Unna) Below the Knee    NAME:  Maria Esther Motta OF BIRTH:  1980  MEDICAL RECORD NUMBER:  [id number]  DATE:  8/25/2022    Multilayer compression wrap: Removed old Multilayer wrap if indicated and wash leg with mild soap/water. Applied moisturizing agent to dry skin as needed. Applied primary and secondary dressing as ordered. Applied multilayered dressing below the knee to right lower leg. Applied multilayered dressing below the knee to left lower leg. Instructed patient/caregiver not to remove dressing and to keep it clean and dry. Instructed patient/caregiver on complications to report to provider, such as pain, numbness in toes, heavy drainage, and slippage of dressing. Instructed patient on purpose of compression dressing and on activity and exercise recommendations.       Electronically signed by Indu Wood LPN on 6/97/7600 at 6:89 AM

## 2022-08-26 NOTE — DISCHARGE INSTRUCTIONS
PHYSICIAN ORDERS AND DISCHARGE INSTRUCTIONS     NOTE: Upon discharge from the [address], you will receive a patient experience survey. We would be grateful if you would take the time to fill this survey out. Wound cleansing:              Do not scrub or use excessive force. Wash hands with soap and water before and after dressing changes. Prior to applying a clean dressing, cleanse wound with normal saline, wound cleanser, or mild soap and water. Ask the physician or nurse before getting the wound(s) wet in a shower     Daily Wound management:              Keep weight off wounds and reposition every 2 hours. Avoid standing for long periods of time. Apply wraps/stockings in AM and remove at bedtime. If swelling is present, elevate legs to the level of the heart or above for 30 minutes 4-5 times a day and/or when sitting. When taking antibiotics take entire prescription as ordered by physician do not stop taking until medicine is all gone. Wound Care Notes:  Rx: Yaya Her  Apply for grafts 05/11/22: aLL GRAFTS approved 06/08/22 for Left Toe amp site  Reapply for graft 08/22/22 for Left toes amp site   YADY's   Right 1.07      Left    1.1           Date: 08/15/22           Grafts Left Foot Amp Site  Puraply #1 Graft #1 4x4 fenestrated 06/15/22   Puraply #2 Graft #2 4x4 fenestrated 06/22/22                                    Orders for this week:  08/29/22           Right and Left Plantar, and Left Great toe amp site--Clean with soap and water, pat  dry. Apply anasept gel and stimulen powder to wound bed. Cover with Ca Alginate and Sorbex. Wrap with Coban 2 Lite. Leave in place until nurse visit Thursday            Nurse Visit Thursday   Follow Up Instructions:  At the [address] [facility] [address] in 1 week:  Primary Wound Care Provider: Travis Zaldivar CNP   Call [phone] for any questions or concerns.   Central [phone]

## 2022-08-28 ENCOUNTER — HOSPITAL ENCOUNTER (OUTPATIENT)
Age: 42
Setting detail: OBSERVATION
Discharge: HOME | DRG: 254 | End: 2022-08-29
Attending: EMERGENCY MEDICINE | Admitting: INTERNAL MEDICINE
Payer: COMMERCIAL

## 2022-08-28 ENCOUNTER — APPOINTMENT (OUTPATIENT)
Dept: CT IMAGING | Age: 42
DRG: 254 | End: 2022-08-28
Payer: COMMERCIAL

## 2022-08-28 DIAGNOSIS — R11.2 NON-INTRACTABLE VOMITING WITH NAUSEA, UNSPECIFIED VOMITING TYPE: Primary | ICD-10-CM

## 2022-08-28 DIAGNOSIS — E86.0 DEHYDRATION: ICD-10-CM

## 2022-08-28 DIAGNOSIS — R11.2 INTRACTABLE VOMITING WITH NAUSEA: ICD-10-CM

## 2022-08-28 DIAGNOSIS — R79.89 ELEVATED LACTIC ACID LEVEL: ICD-10-CM

## 2022-08-28 DIAGNOSIS — R73.9 HYPERGLYCEMIA: ICD-10-CM

## 2022-08-28 DIAGNOSIS — E88.89 KETOSIS (HCC): ICD-10-CM

## 2022-08-28 LAB
ALBUMIN SERPL-MCNC: 3.3 GM/DL (ref 3.4–5)
ALP BLD-CCNC: 171 IU/L (ref 40–128)
ALT SERPL-CCNC: 14 U/L (ref 10–40)
AMPHETAMINES: NEGATIVE
ANION GAP SERPL CALCULATED.3IONS-SCNC: 15 MMOL/L (ref 4–16)
AST SERPL-CCNC: 15 IU/L (ref 15–37)
BARBITURATE SCREEN URINE: NEGATIVE
BASE EXCESS MIXED: 2.4 (ref 0–1.2)
BASOPHILS ABSOLUTE: 0 K/CU MM
BASOPHILS RELATIVE PERCENT: 0.2 % (ref 0–1)
BENZODIAZEPINE SCREEN, URINE: NEGATIVE
BETA-HYDROXYBUTYRATE: 15 MG/DL (ref 0–3)
BILIRUB SERPL-MCNC: 0.9 MG/DL (ref 0–1)
BUN BLDV-MCNC: 8 MG/DL (ref 6–23)
CALCIUM SERPL-MCNC: 8.4 MG/DL (ref 8.3–10.6)
CANNABINOID SCREEN URINE: ABNORMAL
CHLORIDE BLD-SCNC: 90 MMOL/L (ref 99–110)
CO2: 21 MMOL/L (ref 21–32)
COCAINE METABOLITE: NEGATIVE
COMMENT: ABNORMAL
CREAT SERPL-MCNC: 1.1 MG/DL (ref 0.9–1.3)
DIFFERENTIAL TYPE: ABNORMAL
EOSINOPHILS ABSOLUTE: 0 K/CU MM
EOSINOPHILS RELATIVE PERCENT: 0 % (ref 0–3)
GFR AFRICAN AMERICAN: >60 ML/MIN/1.73M2
GFR NON-AFRICAN AMERICAN: >60 ML/MIN/1.73M2
GLUCOSE BLD-MCNC: 124 MG/DL (ref 70–99)
GLUCOSE BLD-MCNC: 155 MG/DL (ref 70–99)
GLUCOSE BLD-MCNC: 194 MG/DL (ref 70–99)
GLUCOSE BLD-MCNC: 209 MG/DL (ref 70–99)
GLUCOSE BLD-MCNC: 336 MG/DL (ref 70–99)
GLUCOSE BLD-MCNC: 358 MG/DL (ref 70–99)
GLUCOSE BLD-MCNC: 420 MG/DL (ref 70–99)
HCO3 VENOUS: 24.5 MMOL/L (ref 19–25)
HCT VFR BLD CALC: 44.8 % (ref 42–52)
HEMOGLOBIN: 14.5 GM/DL (ref 13.5–18)
IMMATURE NEUTROPHIL %: 0.5 % (ref 0–0.43)
LACTATE: 3 MMOL/L (ref 0.4–2)
LACTIC ACID, SEPSIS: 1.3 MMOL/L (ref 0.5–1.9)
LACTIC ACID, SEPSIS: 2.5 MMOL/L (ref 0.5–1.9)
LIPASE: 20 IU/L (ref 13–60)
LYMPHOCYTES ABSOLUTE: 0.6 K/CU MM
LYMPHOCYTES RELATIVE PERCENT: 5.6 % (ref 24–44)
MAGNESIUM: 1.7 MG/DL (ref 1.8–2.4)
MCH RBC QN AUTO: 28.7 PG (ref 27–31)
MCHC RBC AUTO-ENTMCNC: 32.4 % (ref 32–36)
MCV RBC AUTO: 88.7 FL (ref 78–100)
MONOCYTES ABSOLUTE: 0.8 K/CU MM
MONOCYTES RELATIVE PERCENT: 7.8 % (ref 0–4)
NUCLEATED RBC %: 0 %
O2 SAT, VEN: 92.6 % (ref 50–70)
OPIATES, URINE: NEGATIVE
OXYCODONE: NEGATIVE
PCO2, VEN: 30 MMHG (ref 38–52)
PDW BLD-RTO: 15.2 % (ref 11.7–14.9)
PH VENOUS: 7.52 (ref 7.32–7.42)
PHENCYCLIDINE, URINE: NEGATIVE
PHOSPHORUS: 0.9 MG/DL (ref 2.5–4.9)
PLATELET # BLD: 476 K/CU MM (ref 140–440)
PMV BLD AUTO: 8.3 FL (ref 7.5–11.1)
PO2, VEN: 72 MMHG (ref 28–48)
POTASSIUM SERPL-SCNC: 4.3 MMOL/L (ref 3.5–5.1)
RBC # BLD: 5.05 M/CU MM (ref 4.6–6.2)
REASON FOR REJECTION: NORMAL
REJECTED TEST: NORMAL
SEGMENTED NEUTROPHILS ABSOLUTE COUNT: 9 K/CU MM
SEGMENTED NEUTROPHILS RELATIVE PERCENT: 85.9 % (ref 36–66)
SODIUM BLD-SCNC: 126 MMOL/L (ref 135–145)
TOTAL IMMATURE NEUTOROPHIL: 0.05 K/CU MM
TOTAL NUCLEATED RBC: 0 K/CU MM
TOTAL PROTEIN: 8.7 GM/DL (ref 6.4–8.2)
WBC # BLD: 10.4 K/CU MM (ref 4–10.5)

## 2022-08-28 PROCEDURE — C9113 INJ PANTOPRAZOLE SODIUM, VIA: HCPCS | Performed by: INTERNAL MEDICINE

## 2022-08-28 PROCEDURE — 94761 N-INVAS EAR/PLS OXIMETRY MLT: CPT

## 2022-08-28 PROCEDURE — 6360000002 HC RX W HCPCS: Performed by: INTERNAL MEDICINE

## 2022-08-28 PROCEDURE — 83605 ASSAY OF LACTIC ACID: CPT

## 2022-08-28 PROCEDURE — 99285 EMERGENCY DEPT VISIT HI MDM: CPT

## 2022-08-28 PROCEDURE — 6370000000 HC RX 637 (ALT 250 FOR IP): Performed by: INTERNAL MEDICINE

## 2022-08-28 PROCEDURE — 36415 COLL VENOUS BLD VENIPUNCTURE: CPT

## 2022-08-28 PROCEDURE — 96375 TX/PRO/DX INJ NEW DRUG ADDON: CPT

## 2022-08-28 PROCEDURE — 6370000000 HC RX 637 (ALT 250 FOR IP): Performed by: NURSE PRACTITIONER

## 2022-08-28 PROCEDURE — A4216 STERILE WATER/SALINE, 10 ML: HCPCS | Performed by: INTERNAL MEDICINE

## 2022-08-28 PROCEDURE — 96367 TX/PROPH/DG ADDL SEQ IV INF: CPT

## 2022-08-28 PROCEDURE — 82962 GLUCOSE BLOOD TEST: CPT

## 2022-08-28 PROCEDURE — 96376 TX/PRO/DX INJ SAME DRUG ADON: CPT

## 2022-08-28 PROCEDURE — 74177 CT ABD & PELVIS W/CONTRAST: CPT

## 2022-08-28 PROCEDURE — 87040 BLOOD CULTURE FOR BACTERIA: CPT

## 2022-08-28 PROCEDURE — 82010 KETONE BODYS QUAN: CPT

## 2022-08-28 PROCEDURE — 80307 DRUG TEST PRSMV CHEM ANLYZR: CPT

## 2022-08-28 PROCEDURE — 2580000003 HC RX 258: Performed by: EMERGENCY MEDICINE

## 2022-08-28 PROCEDURE — 85025 COMPLETE CBC W/AUTO DIFF WBC: CPT

## 2022-08-28 PROCEDURE — 6360000004 HC RX CONTRAST MEDICATION: Performed by: EMERGENCY MEDICINE

## 2022-08-28 PROCEDURE — 2580000003 HC RX 258: Performed by: NURSE PRACTITIONER

## 2022-08-28 PROCEDURE — 6360000002 HC RX W HCPCS: Performed by: EMERGENCY MEDICINE

## 2022-08-28 PROCEDURE — 83690 ASSAY OF LIPASE: CPT

## 2022-08-28 PROCEDURE — 2580000003 HC RX 258: Performed by: INTERNAL MEDICINE

## 2022-08-28 PROCEDURE — 2500000003 HC RX 250 WO HCPCS: Performed by: INTERNAL MEDICINE

## 2022-08-28 PROCEDURE — 80053 COMPREHEN METABOLIC PANEL: CPT

## 2022-08-28 PROCEDURE — 84100 ASSAY OF PHOSPHORUS: CPT

## 2022-08-28 PROCEDURE — 96374 THER/PROPH/DIAG INJ IV PUSH: CPT

## 2022-08-28 PROCEDURE — 96365 THER/PROPH/DIAG IV INF INIT: CPT

## 2022-08-28 PROCEDURE — G0378 HOSPITAL OBSERVATION PER HR: HCPCS

## 2022-08-28 PROCEDURE — 80048 BASIC METABOLIC PNL TOTAL CA: CPT

## 2022-08-28 PROCEDURE — 96361 HYDRATE IV INFUSION ADD-ON: CPT

## 2022-08-28 PROCEDURE — 82805 BLOOD GASES W/O2 SATURATION: CPT

## 2022-08-28 PROCEDURE — 96366 THER/PROPH/DIAG IV INF ADDON: CPT

## 2022-08-28 PROCEDURE — 83735 ASSAY OF MAGNESIUM: CPT

## 2022-08-28 RX ORDER — ONDANSETRON 2 MG/ML
4 INJECTION INTRAMUSCULAR; INTRAVENOUS EVERY 6 HOURS PRN
Status: DISCONTINUED | OUTPATIENT
Start: 2022-08-28 | End: 2022-08-29 | Stop reason: HOSPADM

## 2022-08-28 RX ORDER — PANTOPRAZOLE SODIUM 40 MG/10ML
40 INJECTION, POWDER, LYOPHILIZED, FOR SOLUTION INTRAVENOUS DAILY
Status: DISCONTINUED | OUTPATIENT
Start: 2022-08-28 | End: 2022-08-28 | Stop reason: CLARIF

## 2022-08-28 RX ORDER — ONDANSETRON 2 MG/ML
4 INJECTION INTRAMUSCULAR; INTRAVENOUS ONCE
Status: COMPLETED | OUTPATIENT
Start: 2022-08-28 | End: 2022-08-28

## 2022-08-28 RX ORDER — SODIUM CHLORIDE 9 MG/ML
INJECTION, SOLUTION INTRAVENOUS PRN
Status: DISCONTINUED | OUTPATIENT
Start: 2022-08-28 | End: 2022-08-29 | Stop reason: HOSPADM

## 2022-08-28 RX ORDER — INSULIN GLARGINE 100 [IU]/ML
30 INJECTION, SOLUTION SUBCUTANEOUS NIGHTLY
Status: DISCONTINUED | OUTPATIENT
Start: 2022-08-28 | End: 2022-08-29 | Stop reason: HOSPADM

## 2022-08-28 RX ORDER — LOSARTAN POTASSIUM 25 MG/1
25 TABLET ORAL DAILY
Status: DISCONTINUED | OUTPATIENT
Start: 2022-08-28 | End: 2022-08-29 | Stop reason: HOSPADM

## 2022-08-28 RX ORDER — KETOROLAC TROMETHAMINE 30 MG/ML
30 INJECTION, SOLUTION INTRAMUSCULAR; INTRAVENOUS ONCE
Status: COMPLETED | OUTPATIENT
Start: 2022-08-28 | End: 2022-08-28

## 2022-08-28 RX ORDER — ONDANSETRON 4 MG/1
4 TABLET, ORALLY DISINTEGRATING ORAL EVERY 8 HOURS PRN
Status: DISCONTINUED | OUTPATIENT
Start: 2022-08-28 | End: 2022-08-29 | Stop reason: HOSPADM

## 2022-08-28 RX ORDER — SODIUM CHLORIDE 9 MG/ML
INJECTION, SOLUTION INTRAVENOUS CONTINUOUS
Status: DISCONTINUED | OUTPATIENT
Start: 2022-08-28 | End: 2022-08-28

## 2022-08-28 RX ORDER — METOCLOPRAMIDE 10 MG/1
10 TABLET ORAL
Status: DISCONTINUED | OUTPATIENT
Start: 2022-08-28 | End: 2022-08-29 | Stop reason: HOSPADM

## 2022-08-28 RX ORDER — ATORVASTATIN CALCIUM 40 MG/1
40 TABLET, FILM COATED ORAL NIGHTLY
Status: DISCONTINUED | OUTPATIENT
Start: 2022-08-28 | End: 2022-08-29 | Stop reason: HOSPADM

## 2022-08-28 RX ORDER — 0.9 % SODIUM CHLORIDE 0.9 %
1000 INTRAVENOUS SOLUTION INTRAVENOUS ONCE
Status: COMPLETED | OUTPATIENT
Start: 2022-08-28 | End: 2022-08-28

## 2022-08-28 RX ORDER — SODIUM CHLORIDE 0.9 % (FLUSH) 0.9 %
5-40 SYRINGE (ML) INJECTION EVERY 12 HOURS SCHEDULED
Status: DISCONTINUED | OUTPATIENT
Start: 2022-08-28 | End: 2022-08-29 | Stop reason: HOSPADM

## 2022-08-28 RX ORDER — INSULIN GLARGINE 100 [IU]/ML
30 INJECTION, SOLUTION SUBCUTANEOUS NIGHTLY
Status: DISCONTINUED | OUTPATIENT
Start: 2022-08-28 | End: 2022-08-28

## 2022-08-28 RX ORDER — METOPROLOL SUCCINATE 25 MG/1
25 TABLET, EXTENDED RELEASE ORAL DAILY
Status: DISCONTINUED | OUTPATIENT
Start: 2022-08-28 | End: 2022-08-29 | Stop reason: HOSPADM

## 2022-08-28 RX ORDER — INSULIN LISPRO 100 [IU]/ML
0-16 INJECTION, SOLUTION INTRAVENOUS; SUBCUTANEOUS
Status: DISCONTINUED | OUTPATIENT
Start: 2022-08-28 | End: 2022-08-29 | Stop reason: HOSPADM

## 2022-08-28 RX ORDER — POLYETHYLENE GLYCOL 3350 17 G/17G
17 POWDER, FOR SOLUTION ORAL DAILY PRN
Status: DISCONTINUED | OUTPATIENT
Start: 2022-08-28 | End: 2022-08-29 | Stop reason: HOSPADM

## 2022-08-28 RX ORDER — SODIUM CHLORIDE 9 MG/ML
INJECTION, SOLUTION INTRAVENOUS CONTINUOUS
Status: ACTIVE | OUTPATIENT
Start: 2022-08-28 | End: 2022-08-29

## 2022-08-28 RX ORDER — INSULIN GLARGINE 100 [IU]/ML
30 INJECTION, SOLUTION SUBCUTANEOUS ONCE
Status: COMPLETED | OUTPATIENT
Start: 2022-08-28 | End: 2022-08-28

## 2022-08-28 RX ORDER — MAGNESIUM SULFATE IN WATER 40 MG/ML
2000 INJECTION, SOLUTION INTRAVENOUS ONCE
Status: COMPLETED | OUTPATIENT
Start: 2022-08-28 | End: 2022-08-28

## 2022-08-28 RX ORDER — FLUOXETINE HYDROCHLORIDE 20 MG/1
80 CAPSULE ORAL DAILY
Status: DISCONTINUED | OUTPATIENT
Start: 2022-08-28 | End: 2022-08-29 | Stop reason: HOSPADM

## 2022-08-28 RX ORDER — DEXTROSE MONOHYDRATE 100 MG/ML
INJECTION, SOLUTION INTRAVENOUS CONTINUOUS PRN
Status: DISCONTINUED | OUTPATIENT
Start: 2022-08-28 | End: 2022-08-29 | Stop reason: HOSPADM

## 2022-08-28 RX ORDER — ACETAMINOPHEN 325 MG/1
650 TABLET ORAL EVERY 6 HOURS PRN
Status: DISCONTINUED | OUTPATIENT
Start: 2022-08-28 | End: 2022-08-29 | Stop reason: HOSPADM

## 2022-08-28 RX ORDER — SODIUM CHLORIDE 0.9 % (FLUSH) 0.9 %
5-40 SYRINGE (ML) INJECTION PRN
Status: DISCONTINUED | OUTPATIENT
Start: 2022-08-28 | End: 2022-08-29 | Stop reason: HOSPADM

## 2022-08-28 RX ADMIN — KETOROLAC TROMETHAMINE 30 MG: 30 INJECTION, SOLUTION INTRAMUSCULAR; INTRAVENOUS at 02:35

## 2022-08-28 RX ADMIN — ONDANSETRON 4 MG: 4 TABLET, ORALLY DISINTEGRATING ORAL at 11:04

## 2022-08-28 RX ADMIN — ATORVASTATIN CALCIUM 40 MG: 40 TABLET, FILM COATED ORAL at 20:08

## 2022-08-28 RX ADMIN — IOPAMIDOL 75 ML: 755 INJECTION, SOLUTION INTRAVENOUS at 03:50

## 2022-08-28 RX ADMIN — SODIUM CHLORIDE, PRESERVATIVE FREE 10 ML: 5 INJECTION INTRAVENOUS at 20:08

## 2022-08-28 RX ADMIN — FLUOXETINE 80 MG: 20 CAPSULE ORAL at 09:05

## 2022-08-28 RX ADMIN — METOCLOPRAMIDE 10 MG: 10 TABLET ORAL at 16:31

## 2022-08-28 RX ADMIN — ONDANSETRON 4 MG: 2 INJECTION INTRAMUSCULAR; INTRAVENOUS at 23:07

## 2022-08-28 RX ADMIN — INSULIN GLARGINE 30 UNITS: 100 INJECTION, SOLUTION SUBCUTANEOUS at 09:07

## 2022-08-28 RX ADMIN — MAGNESIUM SULFATE HEPTAHYDRATE 2000 MG: 2 INJECTION, SOLUTION INTRAVENOUS at 16:28

## 2022-08-28 RX ADMIN — METOCLOPRAMIDE 10 MG: 10 TABLET ORAL at 20:08

## 2022-08-28 RX ADMIN — SODIUM CHLORIDE 1000 ML: 9 INJECTION, SOLUTION INTRAVENOUS at 13:33

## 2022-08-28 RX ADMIN — SODIUM CHLORIDE 1000 ML: 9 INJECTION, SOLUTION INTRAVENOUS at 02:35

## 2022-08-28 RX ADMIN — PANTOPRAZOLE SODIUM 40 MG: 40 INJECTION, POWDER, FOR SOLUTION INTRAVENOUS at 08:45

## 2022-08-28 RX ADMIN — INSULIN LISPRO 12 UNITS: 100 INJECTION, SOLUTION INTRAVENOUS; SUBCUTANEOUS at 09:18

## 2022-08-28 RX ADMIN — SODIUM CHLORIDE: 9 INJECTION, SOLUTION INTRAVENOUS at 06:18

## 2022-08-28 RX ADMIN — INSULIN GLARGINE 30 UNITS: 100 INJECTION, SOLUTION SUBCUTANEOUS at 20:27

## 2022-08-28 RX ADMIN — ONDANSETRON 4 MG: 2 INJECTION INTRAMUSCULAR; INTRAVENOUS at 02:35

## 2022-08-28 RX ADMIN — SODIUM CHLORIDE: 9 INJECTION, SOLUTION INTRAVENOUS at 05:45

## 2022-08-28 RX ADMIN — POTASSIUM PHOSPHATE, MONOBASIC AND POTASSIUM PHOSPHATE, DIBASIC 30 MMOL: 224; 236 INJECTION, SOLUTION, CONCENTRATE INTRAVENOUS at 10:22

## 2022-08-28 RX ADMIN — ACETAMINOPHEN 650 MG: 325 TABLET ORAL at 15:36

## 2022-08-28 RX ADMIN — LOSARTAN POTASSIUM 25 MG: 25 TABLET, FILM COATED ORAL at 09:06

## 2022-08-28 RX ADMIN — METOPROLOL SUCCINATE 25 MG: 25 TABLET, EXTENDED RELEASE ORAL at 09:05

## 2022-08-28 ASSESSMENT — ENCOUNTER SYMPTOMS
ABDOMINAL DISTENTION: 0
EYE REDNESS: 0
VOICE CHANGE: 0
CHEST TIGHTNESS: 0
RHINORRHEA: 0
TROUBLE SWALLOWING: 0
EYE ITCHING: 0
SINUS PAIN: 0
BACK PAIN: 0
ABDOMINAL PAIN: 1
SHORTNESS OF BREATH: 0
EYE DISCHARGE: 0
VOMITING: 1
NAUSEA: 1
COUGH: 0
CONSTIPATION: 0
SORE THROAT: 0
EYE PAIN: 0
PHOTOPHOBIA: 0
BLOOD IN STOOL: 0
DIARRHEA: 0

## 2022-08-28 ASSESSMENT — PAIN SCALES - GENERAL
PAINLEVEL_OUTOF10: 0
PAINLEVEL_OUTOF10: 0
PAINLEVEL_OUTOF10: 5
PAINLEVEL_OUTOF10: 8
PAINLEVEL_OUTOF10: 0

## 2022-08-28 ASSESSMENT — PAIN DESCRIPTION - LOCATION
LOCATION: HEAD
LOCATION: ABDOMEN;FLANK

## 2022-08-28 ASSESSMENT — PAIN DESCRIPTION - ORIENTATION: ORIENTATION: MID

## 2022-08-28 ASSESSMENT — PAIN DESCRIPTION - DESCRIPTORS: DESCRIPTORS: ACHING

## 2022-08-28 NOTE — ED TRIAGE NOTES
Patient presents to ED via ems for emesis x8 hrs. Patient denies medication or drug use. States \"I dunno man I just can't keep anything down. \"

## 2022-08-28 NOTE — PROGRESS NOTES
Patient admitted to unit, alert and oriented x4, skin assessment completed, patient has wounds on bilateral foot, right toes and left two toes amputation, verified with RN Janna Dove. Patient is made comfortable in bed, bed in low position, call light and items within reach.

## 2022-08-28 NOTE — H&P
V2.0  History and Physical      Name:  Madan Ho /Age/Sex: 1980  (39 y.o. male)   MRN & CSN:  [id number] & [id number] Encounter Date/Time: 2022 7:14 AM EDT   Location:  [address] [place] [address]-A PCP: Lit Langford [address] [facility] Day: 1    Assessment and Plan:   Madan Ho is a 39 y.o. male with a pmh of insulin-dependent diabetes mellitus, hyperlipidemia, depression, hypertension, who presents with Intractable vomiting with nausea for the past 8 hours. Does not report any outside food or dirty water. No rashes or than any abdominal pain, fever, chills, rigors. The vomitus has been nonbloody and nonbilious. Patient reports that he has had similar episode before where he had to get admitted; last episode last year. Hospital Problems             Last Modified POA    * (Principal) Intractable vomiting with nausea 2022 Yes       Intractable vomiting, likely acute gastroenteritis  -Continue IV fluid replacement with normal saline at 125 mL an hour for the next 12 hours  -Continue to assess volume status  -Daily electrolyte checks and replacement as needed  -As needed ondansetron; change if ondansetron is less effective    Hypochloremic metabolic alkalosis 2/2 vomiting  -Continue IV fluid replacement with normal saline at 125 mL an hour for the next 12 hours    Hypophosphatemia 2/2 vomiting  -replace    Lactic acidosis 2/2 hypovolemia  -Repeat lactic acid until normal    Starvation ketosis  -Continue to monitor    Insulin-dependent diabetes mellitus with hyperglycemia, not in DKA or hyperglycemic state.   Home medication include dulaglutide 1.5 Mg weekly, glipizide 10 mg twice daily, metformin 100 Mg twice daily, pioglitazone, Lantus 30 units nightly  -Start Lantus 30 units  -High-dose sliding scale  -POCT glucose checks  -Hypoglycemia treatment orders  -Adjust based on insulin requirement    History of hyperlipidemia  -Resume home atorvastatin 40 Mg daily    History of hypertension  -Resume home losartan 25 Mg daily and Toprol-XL 25 Mg daily    History of depression  -Resume home fluoxetine      Disposition:   Current Living situation: Family  Expected Disposition: Home  Estimated D/C: 1 to 2 days    Diet ADULT DIET; Regular   DVT Prophylaxis [] Lovenox, []  Heparin, [] SCDs, [] Ambulation,  [] Eliquis, [] Xarelto, [] Coumadin   Code Status Full Code   Surrogate Decision Maker/ POA -     History from:     patient    History of Present Illness:     Chief Complaint: Intractable vomiting with nausea  Turner Monday is a 39 y.o. male with a pmh of insulin-dependent diabetes mellitus, hyperlipidemia, depression, hypertension, who presents with Intractable vomiting with nausea for the past 8 hours. Does not report any outside food or dirty water. No rashes or than any abdominal pain, fever, chills, rigors. The vomitus has been nonbloody and nonbilious. Review of Systems: Need 10 Elements   Review of Systems   Constitutional:  Positive for appetite change. Negative for activity change, chills, fatigue and fever. HENT:  Negative for congestion, ear discharge, ear pain, hearing loss, nosebleeds, postnasal drip, rhinorrhea, sinus pain, sore throat, trouble swallowing and voice change. Eyes:  Negative for photophobia, pain, discharge, redness and itching. Respiratory:  Negative for cough, chest tightness and shortness of breath. Cardiovascular:  Negative for chest pain, palpitations and leg swelling. Gastrointestinal:  Positive for abdominal pain, nausea and vomiting. Negative for abdominal distention, blood in stool, constipation and diarrhea. Endocrine: Negative for cold intolerance, heat intolerance, polydipsia, polyphagia and polyuria. Genitourinary:  Negative for difficulty urinating, dysuria, flank pain, frequency, hematuria and urgency. Musculoskeletal:  Negative for arthralgias, back pain, gait problem, joint swelling and myalgias. Skin:  Negative for pallor, rash and wound. Allergic/Immunologic: Negative for environmental allergies and food allergies. Neurological:  Negative for dizziness, tremors, seizures, syncope, speech difficulty, weakness, light-headedness, numbness and headaches. Hematological:  Negative for adenopathy. Does not bruise/bleed easily. Psychiatric/Behavioral:  Negative for agitation, confusion, decreased concentration, hallucinations, self-injury, sleep disturbance and suicidal ideas. The patient is not nervous/anxious and is not hyperactive. Objective:   No intake or output data in the 24 hours ending 08/28/22 0714   Vitals:   Vitals:    08/28/22 0254 08/28/22 0344 08/28/22 0405 08/28/22 0558   BP:  (!) 159/93 (!) 157/93 (!) 155/93   Pulse: (!) 107 (!) 106 (!) 103 90   Resp:    18   Temp:    99.1 °F (37.3 °C)   TempSrc:    Oral   SpO2:  98% 96%    Weight:       Height:           Medications Prior to Admission     Prior to Admission medications    Medication Sig Start Date End Date Taking? Authorizing Provider   sucralfate (CARAFATE) 1 GM/10ML suspension Take 10 mLs by mouth 4 times daily for 7 days 8/18/22 8/25/22  RAFAEL Ball - CNP   glyBURIDE (DIABETA) 5 MG tablet Take 2 tablets by mouth in the morning and at bedtime 8/1/22   RAFAEL Ball - CNP   Dulaglutide 1.5 MG/0.5ML SOPN Inject 1.5 mg into the skin once a week 8/1/22   RAFAEL Ball - CNP   metFORMIN (GLUCOPHAGE) 500 MG tablet Take 2 tablets by mouth in the morning and 2 tablets in the evening. Take with meals.  Indications: Start tomorrow 03/14. 7/29/22 1/25/23  RAFAEL Ball - CNP   omeprazole (PRILOSEC) 20 MG delayed release capsule Take 1 capsule by mouth once daily in the morning 7/29/22   RAFAEL Ball - CNP   metoprolol succinate (TOPROL XL) 25 MG extended release tablet Take 1 tablet by mouth in the morning. 7/29/22   RAFAEL Ball - CNP   losartan (COZAAR) 25 MG tablet Take 1 tablet by mouth in the morning. 7/29/22 10/27/22  Fernando Turner APRN - CNP   FLUoxetine (PROZAC) 40 MG capsule Take 2 capsules by mouth in the morning. 7/28/22 8/27/22  Fernando Turner, APRN - CNP   insulin glargine (LANTUS SOLOSTAR) 100 UNIT/ML injection pen Inject 30 Units into the skin nightly 6/30/22 9/28/22  Pushpa Pope MD   collagenase 250 UNIT/GM ointment Per instructions DAILY (route: topical) 5/23/22   Historical Provider, MD   Alcohol Swabs PADS  4/27/22   Historical Provider, MD   pioglitazone (ACTOS) 30 MG tablet Take 1 tablet by mouth daily 4/27/22 8/18/22  Edward Mathew MD   atorvastatin (LIPITOR) 40 MG tablet Take 1 tablet by mouth nightly 4/27/22 8/18/22  Edward Mathew MD   blood glucose monitor strips Test 2 times a day & as needed for symptoms of irregular blood glucose. Dispense sufficient amount for indicated testing frequency plus additional to accommodate PRN testing needs. 4/27/22   Edward Mathew MD   blood glucose monitor kit and supplies Dispense sufficient amount for indicated testing frequency plus additional to accommodate PRN testing needs. Dispense all needed supplies to include: monitor, strips, lancing device, lancets, control solutions, alcohol swabs. 6/15/20   Patricia Bryant MD   FreeStyle Lancets MISC 1 each by Does not apply route daily 6/15/20   Patricia Bryant MD   acetaminophen (TYLENOL) 325 MG tablet Take 2 tablets by mouth every 4 hours as needed for Pain or Fever 2/28/18   Alyce Britton MD       Physical Exam: Need 8 Elements   Physical Exam  Constitutional:       General: He is not in acute distress. Appearance: Normal appearance. He is normal weight. He is not ill-appearing, toxic-appearing or diaphoretic. HENT:      Head: Normocephalic and atraumatic. Right Ear: External ear normal.      Left Ear: External ear normal.      Nose: Nose normal. No congestion or rhinorrhea.       Mouth/Throat:      Mouth: Mucous membranes are moist.      Pharynx: No oropharyngeal exudate or posterior oropharyngeal erythema. Eyes:      General: No scleral icterus. Right eye: No discharge. Left eye: No discharge. Extraocular Movements: Extraocular movements intact. Conjunctiva/sclera: Conjunctivae normal.      Pupils: Pupils are equal, round, and reactive to light. Neck:      Vascular: No carotid bruit. Cardiovascular:      Rate and Rhythm: Normal rate and regular rhythm. Pulses: Normal pulses. Heart sounds: Normal heart sounds. No murmur heard. No friction rub. No gallop. Pulmonary:      Effort: Pulmonary effort is normal. No respiratory distress. Breath sounds: Normal breath sounds. No stridor. No wheezing or rhonchi. Abdominal:      General: Abdomen is flat. Bowel sounds are normal. There is no distension. Palpations: Abdomen is soft. Tenderness: There is no abdominal tenderness. Musculoskeletal:         General: No swelling, tenderness, deformity or signs of injury. Normal range of motion. Cervical back: Normal range of motion and neck supple. No rigidity or tenderness. Right lower leg: No edema. Left lower leg: No edema. Lymphadenopathy:      Cervical: No cervical adenopathy. Skin:     General: Skin is warm. Capillary Refill: Capillary refill takes less than 2 seconds. Coloration: Skin is not jaundiced or pale. Findings: No bruising or erythema. Neurological:      General: No focal deficit present. Mental Status: He is alert and oriented to person, place, and time. Cranial Nerves: No cranial nerve deficit. Sensory: No sensory deficit. Motor: No weakness. Coordination: Coordination normal.      Gait: Gait normal.      Deep Tendon Reflexes: Reflexes normal.   Psychiatric:         Mood and Affect: Mood normal.         Behavior: Behavior normal.         Thought Content:  Thought content normal.         Judgment: Judgment normal.        Past Medical History:   PMHx   Past Medical History:   Diagnosis Date Abscess of left foot 4/22/2022    Anemia associated with acute blood loss 4/26/2022    Callus of foot     Right foot - see's Dr. Jessy Wilde    Cellulitis of left foot 4/22/2022    Diabetic ulcer of left midfoot associated with type 2 diabetes mellitus, with muscle involvement without evidence of necrosis ([address] 4/26/2022    Diabetic ulcer of left midfoot associated with type 2 diabetes mellitus, with necrosis of muscle ([address] 6/7/2021    Diabetic ulcer of right midfoot associated with type 2 diabetes mellitus, with fat layer exposed ([address] 8/11/2020    Dizziness     positional    Essential hypertension     Follows with PCP    Hyperlipidemia     Lumbar radiculopathy     Septic embolism ([address] 6/13/2020    Subacute osteomyelitis of left foot ([address] 4/22/2022     PSHX:  has a past surgical history that includes Cardiac catheterization (03/2018); [place] tooth extraction; Dental surgery; pr office/outpt visit,procedure only (Left, 4/17/2018); lumbar laminectomy (2018); Toe amputation (Right, 1/8/2021); Achilles tendon surgery (Right, 1/8/2021); hernia repair (Bilateral, 5/27/2020); and Toe amputation (Left, 4/23/2022). Allergies: No Known Allergies  Fam HX:  family history includes Diabetes in his father and mother; Heart Attack in his mother; Heart Disease in his father and mother; Kidney Disease in his mother; Other in his mother.   Soc HX:   Social History     Socioeconomic History    Marital status: Single     Spouse name: None    Number of children: None    Years of education: None    Highest education level: None   Tobacco Use    Smoking status: Never    Smokeless tobacco: Never   Vaping Use    Vaping Use: Never used   Substance and Sexual Activity    Alcohol use: Yes     Comment: \"couple beers a night\"    Drug use: No    Sexual activity: Yes     Partners: Female     Social Determinants of Health     Financial Resource Strain: Medium Risk    Difficulty of Paying Living Expenses: Somewhat hard   Food Insecurity: Food Insecurity Present    Worried About [facility] in the Last Year: Sometimes true    Ran Out of Food in the Last Year: Sometimes true   Transportation Needs: No Transportation Needs    Lack of Transportation (Medical): No    Lack of Transportation (Non-Medical):  No   Physical Activity: Inactive    Days of Exercise per Week: 0 days    Minutes of Exercise per Session: 0 min   Stress: Stress Concern Present    Feeling of Stress : Rather much   Social Connections: Unknown    Frequency of Communication with Friends and Family: Once a week    Attends [employer] Services: Never    Attends Club or Organization Meetings: Never    Marital Status: Never    Housing Stability: Low Risk     Unable to Pay for Housing in the Last Year: No    Number of Places Lived in the Last Year: 1    Unstable Housing in the Last Year: No       Medications:   Medications:    atorvastatin  40 mg Oral Nightly    FLUoxetine  80 mg Oral Daily    losartan  25 mg Oral Daily    metoprolol succinate  25 mg Oral Daily    sodium chloride flush  5-40 mL IntraVENous 2 times per day    insulin glargine  30 Units SubCUTAneous Nightly    insulin lispro  0-16 Units SubCUTAneous TID WC    pantoprazole (PROTONIX) 40 mg injection  40 mg IntraVENous Daily    potassium phosphate IVPB  30 mmol IntraVENous Once    magnesium sulfate  2,000 mg IntraVENous Once      Infusions:    sodium chloride 125 mL/hr at 08/28/22 0618    sodium chloride      dextrose       PRN Meds: sodium chloride flush, 5-40 mL, PRN  sodium chloride, , PRN  ondansetron, 4 mg, Q8H PRN   Or  ondansetron, 4 mg, Q6H PRN  polyethylene glycol, 17 g, Daily PRN  acetaminophen, 650 mg, Q6H PRN   Or  acetaminophen, 650 mg, Q6H PRN  glucose, 4 tablet, PRN  dextrose bolus, 125 mL, PRN   Or  dextrose bolus, 250 mL, PRN  glucagon (rDNA), 1 mg, PRN  dextrose, , Continuous PRN        Labs      CBC:   Recent Labs     08/28/22  0230   WBC 10.4   HGB 14.5   *     BMP:    Recent Labs 08/28/22  0230   *   K 4.3   CL 90*   CO2 21   BUN 8   CREATININE 1.1   GLUCOSE 420*     Hepatic:   Recent Labs     08/28/22  0230   AST 15   ALT 14   BILITOT 0.9   ALKPHOS 171*     Lipids:   Lab Results   Component Value Date/Time    CHOL 138 07/06/2022 12:39 AM    CHOL 142 03/12/2020 08:26 AM    HDL 33 07/06/2022 12:39 AM    TRIG 294 07/06/2022 12:39 AM     Hemoglobin A1C:   Lab Results   Component Value Date/Time    LABA1C 10.5 07/28/2022 10:45 AM     TSH: No results found for: TSH  Troponin:   Lab Results   Component Value Date/Time    TROPONINT <0.010 05/21/2022 03:47 PM    TROPONINT <0.010 05/21/2022 09:40 AM    TROPONINT <0.010 05/20/2022 11:04 PM     Lactic Acid: No results for input(s): LACTA in the last 72 hours. BNP: No results for input(s): PROBNP in the last 72 hours. UA:  Lab Results   Component Value Date/Time    NITRU NEGATIVE 07/06/2022 01:48 AM    COLORU YELLOW 07/06/2022 01:48 AM    WBCUA <1 07/06/2022 01:48 AM    RBCUA <1 07/06/2022 01:48 AM    MUCUS RARE 04/18/2022 03:01 PM    TRICHOMONAS NONE SEEN 07/06/2022 01:48 AM    BACTERIA NEGATIVE 07/06/2022 01:48 AM    CLARITYU CLEAR 07/06/2022 01:48 AM    SPECGRAV <1.005 07/06/2022 01:48 AM    LEUKOCYTESUR NEGATIVE 07/06/2022 01:48 AM    UROBILINOGEN 0.2 07/06/2022 01:48 AM    BILIRUBINUR NEGATIVE 07/06/2022 01:48 AM    BLOODU SMALL NUMBER OR AMOUNT OBSERVED 07/06/2022 01:48 AM    KETUA NEGATIVE 07/06/2022 01:48 AM     Urine Cultures: No results found for: [place]  Blood Cultures: No results found for: BC  No results found for: BLOODCULT2  Organism: No results found for: ORG    Imaging/Diagnostics Last 24 Hours   CT ABDOMEN PELVIS W IV CONTRAST Additional Contrast? None    Result Date: 8/28/2022  EXAMINATION: CT OF THE ABDOMEN AND PELVIS WITH CONTRAST 8/28/2022 3:28 am TECHNIQUE: CT of the abdomen and pelvis was performed with the administration of intravenous contrast. Multiplanar reformatted images are provided for review.  Automated exposure control, iterative reconstruction, and/or weight based adjustment of the mA/kV was utilized to reduce the radiation dose to as low as reasonably achievable. COMPARISON: 06/12/2021 HISTORY: ORDERING SYSTEM PROVIDED HISTORY: Vomiting TECHNOLOGIST PROVIDED HISTORY: Reason for exam:->Vomiting Additional Contrast?->None Decision Support Exception - unselect if not a suspected or confirmed emergency medical condition->Emergency Medical Condition (MA) Reason for Exam: Emesis FINDINGS: Lower Chest: No cardiomegaly. No distal esophageal thickening is identified. No parenchymal infiltrate. Organs: Diffuse hepatic steatosis. The gallbladder, adrenal glands, and pancreas appear unremarkable. No enhancing renal mass. Nonobstructive left renal calculi. No ureteral calculi are identified. No periureteral stranding is seen. GI/Bowel: Visualized loops of bowel appear normal in caliber. No ileus or obstruction. No appendicitis or diverticulitis is identified. Pelvis: No pelvic mass. Bladder appears unremarkable. No free fluid in the pelvis. No bulky pelvic lymphadenopathy is identified. Peritoneum/Retroperitoneum: No abdominal aortic aneurysm. No dissection. No retroperitoneal or mesenteric lymphadenopathy is identified. Hernia plug repair noted bilaterally within the inguinal canal region. Bones/Soft Tissues: Prominent inguinal lymph nodes noted bilaterally, though smaller on the left than compared to the previous examination. The lymph node on the right is mildly increased in size. These may be reactive. 1. No ileus or obstruction is identified. No acute inflammatory bowel wall thickening is seen. 2. Mild fatty infiltration seen within the liver. 3. Nonobstructive left renal calculi.          Electronically signed by Josette Smith MD on 8/28/2022 at 7:14 AM

## 2022-08-28 NOTE — ED PROVIDER NOTES
EMERGENCY DEPARTMENT ENCOUNTER      CHIEF COMPLAINT:   Nausea and Vomiting  Epigastric pain      CRITICAL CARE NOTE:  There was a high probability of clinically significant life-threatening deterioration of the patient's condition requiring my urgent intervention. Total critical care time is   35 minutes  This includes vital sign monitoring, pulse oximetry monitoring, telemetry monitoring, clinical response to the IV medications, reviewing the nursing notes, consultation time, dictation/documetation time. (This time excludes time spent performing procedures). HPI: Dellie Goldberg is a 39 y.o. male who presents to the emergency department complaining of nausea and vomiting associated with epigastric pain. The patient states that the symptoms started 8 hours prior to arrival.  He states that he has epigastric burning pain. It is been constant. He has been nauseated and vomiting frequently. He initially had no blood in the vomit but did have an episode with some specks of red blood in it. He does not take blood thinners. He denies diarrhea or constipation. . The symptoms have been intermittent. There are no exacerbating or alleviating factors. He has some heartburn. Denies fevers, chills, chest pain, shortness of breath or any other complaints. [employer] REVIEW OF SYSTEMS:   Constitutional:  Denies fever or chills  Eyes:  Denies change in visual acuity  HENT:  Denies nasal congestion or sore throat  Respiratory:  Denies cough or shortness of breath  Cardiovascular:  Denies chest pain or edema  GI: See HPI  :  Denies dysuria  Musculoskeletal:  Denies back pain or joint pain  Integument:  Denies rash  Neurologic:  Denies headache, focal weakness or sensory changes  \"Remaining review of systems reviewed and negative. I have reviewed the nursing triage documentation and agree unless otherwise noted below. \"      PAST MEDICAL HISTORY:   Past Medical History:   Diagnosis Date    Abscess of left foot 4/22/2022    Anemia associated with acute blood loss 4/26/2022    Callus of foot     Right foot - see's Dr. Macario Milan    Cellulitis of left foot 4/22/2022    Diabetic ulcer of left midfoot associated with type 2 diabetes mellitus, with muscle involvement without evidence of necrosis ([address] 4/26/2022    Diabetic ulcer of left midfoot associated with type 2 diabetes mellitus, with necrosis of muscle ([address] 6/7/2021    Diabetic ulcer of right midfoot associated with type 2 diabetes mellitus, with fat layer exposed ([address] 8/11/2020    Dizziness     positional    Essential hypertension     Follows with PCP    Hyperlipidemia     Lumbar radiculopathy     Septic embolism ([address] 6/13/2020    Subacute osteomyelitis of left foot ([address] 4/22/2022       CURRENT MEDICATIONS:   Home medications reviewed.     SURGICAL HISTORY:   Past Surgical History:   Procedure Laterality Date    ACHILLES TENDON SURGERY Right 1/8/2021    RIGHT ACHILLES TENDON LENGTHENING REPAIR performed by Marina Gould DPM at [address]  03/2018    [address] [place] [address] 51    DENTAL SURGERY      teeth extractions -half per patient    HERNIA REPAIR Bilateral 5/27/2020    BIALTERAL HERNIA INGUINAL REPAIR performed by Briana Yi MD at [facility] 30 2018    CT OFFICE/OUTPT VISIT,PROCEDURE ONLY Left 4/17/2018    L5-S1 HEMILAMINECTOMY, REMOVAL OF DISC LEFT SIDE performed by Samantha Eden MD at [address] Right 1/8/2021    RIGHT TRANSMETATARSAL TOE AMPUTATION performed by Marina Gould DPM at [place] [id number] Left 4/23/2022    LEFT RAY GREAT TOE AMPUTATION performed by Luci Blankenship MD at [facility]         FAMILY HISTORY:   Family History   Problem Relation Age of Onset    Heart Disease Father     Diabetes Father     Diabetes Mother     Heart Disease Mother     Other Mother     Kidney Disease Mother     Heart Attack Mother        SOCIAL HISTORY:   Social History     Socioeconomic History    Marital status: Single     Spouse name: Not on file    Number of children: Not on file    Years of education: Not on file    Highest education level: Not on file   Occupational History    Not on file   Tobacco Use    Smoking status: Never    Smokeless tobacco: Never   Vaping Use    Vaping Use: Never used   Substance and Sexual Activity    Alcohol use: Yes     Comment: \"couple beers a night\"    Drug use: No    Sexual activity: Yes     Partners: Female   Other Topics Concern    Not on file   Social History Narrative    Not on file     Social Determinants of Health     Financial Resource Strain: Medium Risk    Difficulty of Paying Living Expenses: Somewhat hard   Food Insecurity: Food Insecurity Present    Worried About Running Out of Food in the Last Year: Sometimes true    Ran Out of Food in the Last Year: Sometimes true   Transportation Needs: No Transportation Needs    Lack of Transportation (Medical): No    Lack of Transportation (Non-Medical): No   Physical Activity: Inactive    Days of Exercise per Week: 0 days    Minutes of Exercise per Session: 0 min   Stress: Stress Concern Present    Feeling of Stress : Rather much   Social Connections: Unknown    Frequency of Communication with Friends and Family: Once a week    Frequency of Social Gatherings with Friends and Family: Not on file    Attends [employer] Services: Never    Active Member of Clubs or Organizations: Not on file    Attends Club or Organization Meetings: Never    Marital Status: Never    Intimate Partner Violence: Not on file   Housing Stability: [address] to Pay for Housing in the Last Year: No    Number of Places Lived in the Last Year: 1    Unstable Housing in the Last Year: No       ALLERGIES: Patient has no known allergies.     PHYSICAL EXAM:  VITAL SIGNS:   ED Triage Vitals [08/28/22 0118]   Enc Vitals Group      BP (!) 204/114      Heart Rate (!) 129      Resp 22      Temp 100 °F (37.8 °C)      Temp Source Oral SpO2 98 %      Weight 180 lb (81.6 kg)      Height 6' (1.829 m)      Head Circumference       Peak Flow       Pain Score       Pain Loc       Pain Edu? Excl. in [address]? Constitutional:  Non-toxic appearance  HENT: Normocephalic, Atraumatic, Bilateral external ears normal, Oropharynx moist, No oral exudates, Nose normal.  Eyes:  PERRL, EOMI, Conjunctiva normal, No discharge. Neck: Normal range of motion, No tenderness, Supple, No stridor, No lymphadenopathy . Cardiovascular:  Normal heart rate, Normal rhythm  Pulmonary/Chest:  Normal breath sounds, No respiratory distress, No wheezing  Abdomen: Bowel sounds normal, Soft, epigastric tenderness to palpation, no guarding, no rebound  Extremities:  Normal range of motion, Intact distal pulses, No edema, No tenderness  Neurologic:  Alert & oriented x 3, Normal motor function, Sensation intact to light touch throughout, No focal deficits  Skin:  Warm, Dry, No erythema, No rash      EKG Interpretation  None    Radiology / Procedures:  CT ABDOMEN PELVIS W IV CONTRAST Additional Contrast? None (Final result)  Result time 08/28/22 04:10:55  Final result by John Goddard MD (08/28/22 04:10:55)                Impression:    1. No ileus or obstruction is identified. No acute inflammatory bowel wall   thickening is seen. 2. Mild fatty infiltration seen within the liver. 3. Nonobstructive left renal calculi. Narrative:    EXAMINATION:   CT OF THE ABDOMEN AND PELVIS WITH CONTRAST 8/28/2022 3:28 am     TECHNIQUE:   CT of the abdomen and pelvis was performed with the administration of   intravenous contrast. Multiplanar reformatted images are provided for review. Automated exposure control, iterative reconstruction, and/or weight based   adjustment of the mA/kV was utilized to reduce the radiation dose to as low   as reasonably achievable.      COMPARISON:   06/12/2021     HISTORY:   ORDERING SYSTEM PROVIDED HISTORY: Vomiting   TECHNOLOGIST PROVIDED HISTORY:   Reason for exam:->Vomiting   Additional Contrast?->None   Decision Support Exception - unselect if not a suspected or confirmed   emergency medical condition->Emergency Medical Condition (MA)   Reason for Exam: Emesis     FINDINGS:   Lower Chest: No cardiomegaly. No distal esophageal thickening is identified. No parenchymal infiltrate. Organs: Diffuse hepatic steatosis. The gallbladder, adrenal glands, and   pancreas appear unremarkable. No enhancing renal mass. Nonobstructive left   renal calculi. No ureteral calculi are identified. No periureteral   stranding is seen. GI/Bowel: Visualized loops of bowel appear normal in caliber. No ileus or   obstruction. No appendicitis or diverticulitis is identified. Pelvis: No pelvic mass. Bladder appears unremarkable. No free fluid in the   pelvis. No bulky pelvic lymphadenopathy is identified. Peritoneum/Retroperitoneum: No abdominal aortic aneurysm. No dissection. No   retroperitoneal or mesenteric lymphadenopathy is identified. Hernia plug   repair noted bilaterally within the inguinal canal region. Bones/Soft Tissues: Prominent inguinal lymph nodes noted bilaterally, though   smaller on the left than compared to the previous examination. The lymph   node on the right is mildly increased in size. These may be reactive.                Labs Reviewed   CBC WITH AUTO DIFFERENTIAL - Abnormal; Notable for the following components:       Result Value    RDW 15.2 (*)     Platelets 084 (*)     Segs Relative 85.9 (*)     Lymphocytes % 5.6 (*)     Monocytes % 7.8 (*)     Immature Neutrophil % 0.5 (*)     All other components within normal limits   COMPREHENSIVE METABOLIC PANEL - Abnormal; Notable for the following components:    Sodium 126 (*)     Chloride 90 (*)     Glucose 420 (*)     Albumin 3.3 (*)     Total Protein 8.7 (*)     Alkaline Phosphatase 171 (*)     All other components within normal limits   LACTATE, SEPSIS - Abnormal; Notable for the following components:    Lactic Acid, Sepsis 2.5 (*)     All other components within normal limits   BETA-HYDROXYBUTYRATE - Abnormal; Notable for the following components:    Beta-Hydroxybutyrate 15.0 (*)     All other components within normal limits   BLOOD GAS, VENOUS - Abnormal; Notable for the following components:    pH, Eric 7.52 (*)     pCO2, Eric 30 (*)     pO2, Eric 72 (*)     Base Exc, Mixed 2.4 (*)     O2 Sat, Eric 92.6 (*)     All other components within normal limits       ED COURSE & MEDICAL DECISION MAKING:  Pertinent Labs & Imaging studies reviewed. (See chart for details)  On exam, the patient is afebrile and nontoxic appearing. He is hypertensive with a known history of hypertension and is asymptomatic. He is tachycardic. This improves with treatment. He is otherwise hemodynamically stable and neurologically intact. Labs are obtained and are significant for pseudohyponatremia, an elevated glucose of 420, a normal anion gap, normal bicarb, an elevated lactic acid, and elevated beta hydroxybutyrate, and respiratory alkalosis. CT abdomen and pelvis shows no ileus or obstruction. There is no acute inflammatory bowel wall thickening. There is mild fatty infiltration of the liver. There is nonobstructing left renal calculi. The patient was treated with Toradol, Zofran and IV normal saline. I suspect that the patient has nausea and vomiting causing dehydration. He also has hyperglycemia and ketosis without evidence of acidosis. This is most likely a gastrointestinal illness vs another process early in its course. I have a low suspicion for acute appendicitis, intraabdominal abscess, perforation, bowel obstruction, acute cholecystitis, or acute surgical abdomen. I recommended admission to the hospital and the patient was agreeable. I discussed the case with the hospitalist who will admit the patient for further treatment and care.  The patient is currently in stable condition awaiting admission. Clinical Impression:  1. Non-intractable vomiting with nausea, unspecified vomiting type    2. Dehydration    3. Hyperglycemia    4. Ketosis (HCC)    5. Elevated lactic acid level    6. Intractable vomiting with nausea        Comment: Please note this report has been produced using speech recognition software and may contain errors related to that system including errors in grammar, punctuation, and spelling, as well as words and phrases that may be inappropriate. If there are any questions or concerns please feel free to contact the dictating provider for clarification.              Rafael Castellanos MD  09/02/22 [id number]

## 2022-08-28 NOTE — PROGRESS NOTES
Hospitalist Progress Note      Name:  Dellie Goldberg /Age/Sex: 1980  (39 y.o. male)   MRN & CSN:  [id number] & [id number] Admission Date/Time: 2022  1:08 AM   [facility]  [facility]-A PCP: Lisa Arceo 112 Day: 1                                               Attending Physician Dr Melynda Burkitt and Plan:   Dellie Goldberg is a 39 y.o.  male  who presents with Intractable vomiting with nausea    Intractable nausea vomiting   Supportive care   Pending UDS   Continue IV hydration   PRN symptom control   PPI   Add Reglan-possible gastroparesis? Lactic acidosis  Metabolic acidosis  Resolving repeat after IVF bolus 1.3 but trending back up to 3.0   Repeat 1L bolus   Trend labs    Electrolyte abnormalities   Pseudohyponatremia   Hypophosphatemia  Trend and repleat     Starvation Ketosis   Uncontrolled IDDM2   Last A1c 10.5 (2022)   BHB 15.0   Presenting sGlu 420   Lantus x 1 dose now and resume normal schedule    Monitor FSBS and continue SSI    Hyperlipidemia-continue atorvastatin    Hypertension-continue losartan/Toprol  Trends appear uncontrolled continue monitor for now-will likely need to titrate up on medications    Depression continue Prozac      Diet ADULT DIET; Regular   DVT Prophylaxis [x] Lovenox, []  Heparin, [] SCDs, [] Ambulation   GI Prophylaxis [x] PPI,  [] H2 Blocker,  [] Carafate,  [] Diet/Tube Feeds   Code Status Full Code     -Patient assessment and plan discussed with supervising physician-  Subjective 2022     Dellie Goldberg is a 39 y.o.  male, who presented with  Emesis (Since )  . The patient feels improved. Reports symptom onset approximate 2 weeks and progressive. He was able to tolerate a little bit of breakfast this morning without any recurrence of nausea/vomiting.   Unknown etiology    Ten point ROS reviewed negative, unless as noted above    Objective:   No intake or output data in the 24 hours ending 22 1056   Vitals: Vitals:    08/28/22 0344 08/28/22 0405 08/28/22 0558 08/28/22 0845   BP: (!) 159/93 (!) 157/93 (!) 155/93 (!) 147/94   Pulse: (!) 106 (!) 103 90 88   Resp:   18 18   Temp:   99.1 °F (37.3 °C) 98.6 °F (37 °C)   TempSrc:   Oral Oral   SpO2: 98% 96%  99%   Weight:       Height:         Physical Exam: 08/28/22     Gen:  awake, alert, cooperative, no apparent distress normal body habitus  Head/Eyes:  Normocephalic atraumatic, EOMI   NECK:   symmetrical, trachea midline  LUNGS: Normal Effort/ symmetry movement   CARDIOVASCULAR:  Normal rate T  ABDOMEN: Non tender, non distended, no HSM noted. MUSCULOSKELETAL: no gross deformities  NEUROLOGIC: Alert and Oriented,  Cranial nerves II-XII are grossly intact.    SKIN:  no bruising or bleeding, normal skin color,  no redness      Medications:   Medications:    atorvastatin  40 mg Oral Nightly    FLUoxetine  80 mg Oral Daily    losartan  25 mg Oral Daily    metoprolol succinate  25 mg Oral Daily    sodium chloride flush  5-40 mL IntraVENous 2 times per day    insulin lispro  0-16 Units SubCUTAneous TID WC    pantoprazole (PROTONIX) 40 mg injection  40 mg IntraVENous Daily    potassium phosphate IVPB  30 mmol IntraVENous Once    magnesium sulfate  2,000 mg IntraVENous Once    insulin glargine  30 Units SubCUTAneous Nightly      Infusions:    sodium chloride 125 mL/hr at 08/28/22 0618    sodium chloride      dextrose       PRN Meds: sodium chloride flush, 5-40 mL, PRN  sodium chloride, , PRN  ondansetron, 4 mg, Q8H PRN   Or  ondansetron, 4 mg, Q6H PRN  polyethylene glycol, 17 g, Daily PRN  acetaminophen, 650 mg, Q6H PRN   Or  acetaminophen, 650 mg, Q6H PRN  glucose, 4 tablet, PRN  dextrose bolus, 125 mL, PRN   Or  dextrose bolus, 250 mL, PRN  glucagon (rDNA), 1 mg, PRN  dextrose, , Continuous PRN  hydrALAZINE (APRESOLINE) ivpb, 10 mg, Q6H PRN        LABS  CBC   Recent Labs     08/28/22  0230   WBC 10.4   HGB 14.5   HCT 44.8   *      RENAL  Recent Labs     08/28/22  0230 *   K 4.3   CL 90*   CO2 21   PHOS 0.9*   BUN 8   CREATININE 1.1     LFT'S  Recent Labs     08/28/22  0230   AST 15   ALT 14   BILITOT 0.9   ALKPHOS 171*     COAG  No results for input(s): INR in the last 72 hours. CARDIAC ENZYMES  No results for input(s): CKTOTAL, CKMB, CKMBINDEX, TROPONINI in the last 72 hours. Radiology this visit:  Reviewed. CT ABDOMEN PELVIS W IV CONTRAST Additional Contrast? None    Result Date: 8/28/2022  EXAMINATION: CT OF THE ABDOMEN AND PELVIS WITH CONTRAST 8/28/2022 3:28 am TECHNIQUE: CT of the abdomen and pelvis was performed with the administration of intravenous contrast. Multiplanar reformatted images are provided for review. Automated exposure control, iterative reconstruction, and/or weight based adjustment of the mA/kV was utilized to reduce the radiation dose to as low as reasonably achievable. COMPARISON: 06/12/2021 HISTORY: ORDERING SYSTEM PROVIDED HISTORY: Vomiting TECHNOLOGIST PROVIDED HISTORY: Reason for exam:->Vomiting Additional Contrast?->None Decision Support Exception - unselect if not a suspected or confirmed emergency medical condition->Emergency Medical Condition (MA) Reason for Exam: Emesis FINDINGS: Lower Chest: No cardiomegaly. No distal esophageal thickening is identified. No parenchymal infiltrate. Organs: Diffuse hepatic steatosis. The gallbladder, adrenal glands, and pancreas appear unremarkable. No enhancing renal mass. Nonobstructive left renal calculi. No ureteral calculi are identified. No periureteral stranding is seen. GI/Bowel: Visualized loops of bowel appear normal in caliber. No ileus or obstruction. No appendicitis or diverticulitis is identified. Pelvis: No pelvic mass. Bladder appears unremarkable. No free fluid in the pelvis. No bulky pelvic lymphadenopathy is identified. Peritoneum/Retroperitoneum: No abdominal aortic aneurysm. No dissection. No retroperitoneal or mesenteric lymphadenopathy is identified.   Hernia plug repair noted bilaterally within the inguinal canal region. Bones/Soft Tissues: Prominent inguinal lymph nodes noted bilaterally, though smaller on the left than compared to the previous examination. The lymph node on the right is mildly increased in size. These may be reactive. 1. No ileus or obstruction is identified. No acute inflammatory bowel wall thickening is seen. 2. Mild fatty infiltration seen within the liver. 3. Nonobstructive left renal calculi.              Electronically signed by RAFAEL Berrios CNP on 8/28/2022 at 10:56 AM

## 2022-08-29 ENCOUNTER — HOSPITAL ENCOUNTER (OUTPATIENT)
Dept: WOUND CARE | Age: 42
Discharge: HOME | End: 2022-08-29

## 2022-08-29 VITALS
BODY MASS INDEX: 24.38 KG/M2 | RESPIRATION RATE: 20 BRPM | TEMPERATURE: 99.5 F | WEIGHT: 180 LBS | HEIGHT: 72 IN | SYSTOLIC BLOOD PRESSURE: 147 MMHG | OXYGEN SATURATION: 100 % | HEART RATE: 91 BPM | DIASTOLIC BLOOD PRESSURE: 87 MMHG

## 2022-08-29 LAB
ANION GAP SERPL CALCULATED.3IONS-SCNC: 6 MMOL/L (ref 4–16)
BASE EXCESS MIXED: 1.9 (ref 0–1.2)
BASOPHILS ABSOLUTE: 0 K/CU MM
BASOPHILS RELATIVE PERCENT: 0.2 % (ref 0–1)
BUN BLDV-MCNC: 7 MG/DL (ref 6–23)
CALCIUM SERPL-MCNC: 7.2 MG/DL (ref 8.3–10.6)
CHLORIDE BLD-SCNC: 102 MMOL/L (ref 99–110)
CO2: 24 MMOL/L (ref 21–32)
COMMENT: ABNORMAL
CREAT SERPL-MCNC: 0.9 MG/DL (ref 0.9–1.3)
DIFFERENTIAL TYPE: ABNORMAL
EOSINOPHILS ABSOLUTE: 0.1 K/CU MM
EOSINOPHILS RELATIVE PERCENT: 0.8 % (ref 0–3)
GFR AFRICAN AMERICAN: >60 ML/MIN/1.73M2
GFR NON-AFRICAN AMERICAN: >60 ML/MIN/1.73M2
GLUCOSE BLD-MCNC: 126 MG/DL (ref 70–99)
GLUCOSE BLD-MCNC: 135 MG/DL (ref 70–99)
GLUCOSE BLD-MCNC: 220 MG/DL (ref 70–99)
HCO3 VENOUS: 26.6 MMOL/L (ref 19–25)
HCT VFR BLD CALC: 40.3 % (ref 42–52)
HEMOGLOBIN: 12.8 GM/DL (ref 13.5–18)
IMMATURE NEUTROPHIL %: 0.4 % (ref 0–0.43)
LACTATE: 1 MMOL/L (ref 0.4–2)
LYMPHOCYTES ABSOLUTE: 1 K/CU MM
LYMPHOCYTES RELATIVE PERCENT: 9.4 % (ref 24–44)
MAGNESIUM: 1.9 MG/DL (ref 1.8–2.4)
MCH RBC QN AUTO: 29.4 PG (ref 27–31)
MCHC RBC AUTO-ENTMCNC: 31.8 % (ref 32–36)
MCV RBC AUTO: 92.6 FL (ref 78–100)
MONOCYTES ABSOLUTE: 0.7 K/CU MM
MONOCYTES RELATIVE PERCENT: 6.8 % (ref 0–4)
NUCLEATED RBC %: 0 %
O2 SAT, VEN: 91.4 % (ref 50–70)
PCO2, VEN: 41 MMHG (ref 38–52)
PDW BLD-RTO: 15.6 % (ref 11.7–14.9)
PH VENOUS: 7.42 (ref 7.32–7.42)
PHOSPHORUS: 2.8 MG/DL (ref 2.5–4.9)
PLATELET # BLD: 387 K/CU MM (ref 140–440)
PMV BLD AUTO: 8.3 FL (ref 7.5–11.1)
PO2, VEN: 70 MMHG (ref 28–48)
POTASSIUM SERPL-SCNC: 4.1 MMOL/L (ref 3.5–5.1)
RBC # BLD: 4.35 M/CU MM (ref 4.6–6.2)
SEGMENTED NEUTROPHILS ABSOLUTE COUNT: 8.7 K/CU MM
SEGMENTED NEUTROPHILS RELATIVE PERCENT: 82.4 % (ref 36–66)
SODIUM BLD-SCNC: 132 MMOL/L (ref 135–145)
TOTAL IMMATURE NEUTOROPHIL: 0.04 K/CU MM
TOTAL NUCLEATED RBC: 0 K/CU MM
WBC # BLD: 10.5 K/CU MM (ref 4–10.5)

## 2022-08-29 PROCEDURE — 6360000002 HC RX W HCPCS: Performed by: INTERNAL MEDICINE

## 2022-08-29 PROCEDURE — 83735 ASSAY OF MAGNESIUM: CPT

## 2022-08-29 PROCEDURE — 85025 COMPLETE CBC W/AUTO DIFF WBC: CPT

## 2022-08-29 PROCEDURE — 6370000000 HC RX 637 (ALT 250 FOR IP): Performed by: INTERNAL MEDICINE

## 2022-08-29 PROCEDURE — A4216 STERILE WATER/SALINE, 10 ML: HCPCS | Performed by: INTERNAL MEDICINE

## 2022-08-29 PROCEDURE — 99211 OFF/OP EST MAY X REQ PHY/QHP: CPT

## 2022-08-29 PROCEDURE — 6370000000 HC RX 637 (ALT 250 FOR IP): Performed by: NURSE PRACTITIONER

## 2022-08-29 PROCEDURE — 94761 N-INVAS EAR/PLS OXIMETRY MLT: CPT

## 2022-08-29 PROCEDURE — 82805 BLOOD GASES W/O2 SATURATION: CPT

## 2022-08-29 PROCEDURE — 83605 ASSAY OF LACTIC ACID: CPT

## 2022-08-29 PROCEDURE — 96361 HYDRATE IV INFUSION ADD-ON: CPT

## 2022-08-29 PROCEDURE — C9113 INJ PANTOPRAZOLE SODIUM, VIA: HCPCS | Performed by: INTERNAL MEDICINE

## 2022-08-29 PROCEDURE — 2580000003 HC RX 258: Performed by: INTERNAL MEDICINE

## 2022-08-29 PROCEDURE — 80048 BASIC METABOLIC PNL TOTAL CA: CPT

## 2022-08-29 PROCEDURE — 2580000003 HC RX 258: Performed by: NURSE PRACTITIONER

## 2022-08-29 PROCEDURE — 84100 ASSAY OF PHOSPHORUS: CPT

## 2022-08-29 PROCEDURE — 36415 COLL VENOUS BLD VENIPUNCTURE: CPT

## 2022-08-29 PROCEDURE — 96376 TX/PRO/DX INJ SAME DRUG ADON: CPT

## 2022-08-29 PROCEDURE — 82962 GLUCOSE BLOOD TEST: CPT

## 2022-08-29 PROCEDURE — G0378 HOSPITAL OBSERVATION PER HR: HCPCS

## 2022-08-29 RX ORDER — METOCLOPRAMIDE 10 MG/1
10 TABLET ORAL
Qty: 120 TABLET | Refills: 3 | Status: SHIPPED | OUTPATIENT
Start: 2022-08-29 | End: 2022-09-02

## 2022-08-29 RX ORDER — ONDANSETRON 4 MG/1
4 TABLET, ORALLY DISINTEGRATING ORAL EVERY 8 HOURS PRN
Qty: 14 TABLET | Refills: 0 | Status: ON HOLD | OUTPATIENT
Start: 2022-08-29 | End: 2022-09-16 | Stop reason: HOSPADM

## 2022-08-29 RX ADMIN — METOCLOPRAMIDE 10 MG: 10 TABLET ORAL at 06:55

## 2022-08-29 RX ADMIN — METOPROLOL SUCCINATE 25 MG: 25 TABLET, EXTENDED RELEASE ORAL at 08:36

## 2022-08-29 RX ADMIN — SODIUM CHLORIDE: 9 INJECTION, SOLUTION INTRAVENOUS at 03:41

## 2022-08-29 RX ADMIN — METOCLOPRAMIDE 10 MG: 10 TABLET ORAL at 10:54

## 2022-08-29 RX ADMIN — INSULIN LISPRO 4 UNITS: 100 INJECTION, SOLUTION INTRAVENOUS; SUBCUTANEOUS at 12:07

## 2022-08-29 RX ADMIN — LOSARTAN POTASSIUM 25 MG: 25 TABLET, FILM COATED ORAL at 08:36

## 2022-08-29 RX ADMIN — SODIUM CHLORIDE, PRESERVATIVE FREE 10 ML: 5 INJECTION INTRAVENOUS at 08:37

## 2022-08-29 RX ADMIN — PANTOPRAZOLE SODIUM 40 MG: 40 INJECTION, POWDER, FOR SOLUTION INTRAVENOUS at 09:41

## 2022-08-29 RX ADMIN — ACETAMINOPHEN 650 MG: 325 TABLET ORAL at 08:36

## 2022-08-29 RX ADMIN — FLUOXETINE 80 MG: 20 CAPSULE ORAL at 08:36

## 2022-08-29 ASSESSMENT — PAIN SCALES - GENERAL
PAINLEVEL_OUTOF10: 0
PAINLEVEL_OUTOF10: 0

## 2022-08-29 NOTE — CARE COORDINATION
Chart reviewed. Pt from home, independent PTA. Has PCP and insurance. No needs identified at this time. CM available should any new needs arise.

## 2022-08-29 NOTE — DISCHARGE SUMMARY
Discharge Summary    Name:  Deborah Irving /Age/Sex: 1980  (39 y.o. male)   MRN & CSN:  [id number] & [id number] Admission Date/Time: 2022  1:08 AM   Attending:  Marco Galvin MD Discharging Provider RAFAEL Augustin - CNP     Hospital Course:   Deborah Irving is a 39 y.o.  male  who presents with Intractable vomiting with nausea    Hospital Course: The patient was initially admitted 2022 for concerns of abdominal pain/intractable nausea vomiting. He reports symptoms ongoing approximately 2 weeks and progressive. Work-up suggested significant hyperglycemia with starvation ketosis/metabolic alkalosis with electrolyte abnormalities. Over the course of admission clinically improved with IV hydration and supportive care. Repeat biochemical work-up normalized and he was discharged home in stable condition. His remaining clinical course is outlined below      Intractable nausea vomiting-improved              UDS positive for cannabis-possible CAMACHO/cyclic vomiting              Continue Reglan for possible gastroparesis   As needed Zofran prescription sent    Lactic acidosis  Metabolic acidosis  Resolved      Electrolyte abnormalities-resolving              Hyponatremia (132)              Hypophosphatemia-resolved    Starvation Ketosis-resolved  Uncontrolled IDDM2              Last A1c 10.5 (2022)              BHB 15.0              Presenting sGlu 420              Resume home antihyperglycemic regimen-stop metformin   Will need close follow-up with PCP     Hyperlipidemia-continue atorvastatin     Hypertension-continue losartan/Toprol  Monitor BP trends at home     Depression continue Prozac      The patient expressed appropriate understanding of and agreement with the discharge recommendations, medications, and plan.      Consults this admission:  IP CONSULT TO HOSPITALIST    Discharge Instruction:   Follow up appointments:  Primary care physician:  within 2 weeks    Diet: diabetic diet   Activity: activity as tolerated  Disposition: Discharged to:   [x]Home, [][facility], []SNF, []Acute Rehab, []Hospice   Condition on discharge: Stable    Discharge Medications:        Medication List        START taking these medications      metoclopramide 10 MG tablet  Commonly known as: REGLAN  Take 1 tablet by mouth 4 times daily (before meals and nightly)     ondansetron 4 MG disintegrating tablet  Commonly known as: ZOFRAN-ODT  Take 1 tablet by mouth every 8 hours as needed for Nausea or Vomiting            CONTINUE taking these medications      acetaminophen 325 MG tablet  Commonly known as: TYLENOL  Take 2 tablets by mouth every 4 hours as needed for Pain or Fever     Alcohol Swabs Pads     atorvastatin 40 MG tablet  Commonly known as: LIPITOR  Take 1 tablet by mouth nightly     blood glucose monitor kit and supplies  Dispense sufficient amount for indicated testing frequency plus additional to accommodate PRN testing needs. Dispense all needed supplies to include: monitor, strips, lancing device, lancets, control solutions, alcohol swabs. blood glucose test strips  Test 2 times a day & as needed for symptoms of irregular blood glucose. Dispense sufficient amount for indicated testing frequency plus additional to accommodate PRN testing needs. collagenase 250 UNIT/GM ointment     Dulaglutide 1.5 MG/0.5ML Sopn  Inject 1.5 mg into the skin once a week     FLUoxetine 40 MG capsule  Commonly known as: PROzac  Take 2 capsules by mouth in the morning. FreeStyle Lancets Misc  1 each by Does not apply route daily     glyBURIDE 5 MG tablet  Commonly known as: DIABETA  Take 2 tablets by mouth in the morning and at bedtime     Lantus SoloStar 100 UNIT/ML injection pen  Generic drug: insulin glargine  Inject 30 Units into the skin nightly     losartan 25 MG tablet  Commonly known as: COZAAR  Take 1 tablet by mouth in the morning.      metoprolol succinate 25 MG extended release tablet  Commonly known as: TOPROL XL  Take 1 tablet by mouth in the morning. omeprazole 20 MG delayed release capsule  Commonly known as: PRILOSEC  Take 1 capsule by mouth once daily in the morning     pioglitazone 30 MG tablet  Commonly known as: Actos  Take 1 tablet by mouth daily     sucralfate 1 GM/10ML suspension  Commonly known as: Carafate  Take 10 mLs by mouth 4 times daily for 7 days            STOP taking these medications      metFORMIN 500 MG tablet  Commonly known as: GLUCOPHAGE               Where to Get Your Medications        These medications were sent to [address] [place] [address] 25, [address] [id number]      Phone: [phone]   metoclopramide 10 MG tablet  ondansetron 4 MG disintegrating tablet         Objective Findings at Discharge:     Vitals:    08/28/22 1315 08/28/22 2000 08/29/22 0330 08/29/22 0833   BP: (!) 149/98 (!) 143/92 (!) 142/82 (!) 147/87   Pulse: 85 77 95 91   Resp: 20 18 16 20   Temp: 98.4 °F (36.9 °C) 97.6 °F (36.4 °C) 97.8 °F (36.6 °C) 99.5 °F (37.5 °C)   TempSrc: Oral Oral Oral Oral   SpO2: 99% 98% 98% 100%   Weight:       Height:                  Physical Exam: 08/29/22     Gen:  awake, alert, cooperative, no apparent distress normal body habitus  Head/Eyes:  Normocephalic atraumatic, EOMI   NECK:   symmetrical, trachea midline  LUNGS: Normal Effort/ symmetry movement   CARDIOVASCULAR:  Normal rate Tele SR  ABDOMEN: Non tender, non distended, no HSM noted. MUSCULOSKELETAL: no gross deformities  NEUROLOGIC: Alert and Oriented,  Cranial nerves II-XII are grossly intact.    SKIN:  no bruising or bleeding, normal skin color,  no redness    Data:     Laboratory this visit:  Reviewed  Recent Labs     08/28/22  0230 08/29/22  0800   WBC 10.4 10.5   HGB 14.5 12.8*   HCT 44.8 40.3*   * 387      Recent Labs     08/28/22  0230 08/29/22  0800   * 132*   K 4.3 4.1   CL 90* 102   CO2 21 24   PHOS 0.9* 2.8   BUN 8 7   CREATININE 1.1 0.9     Recent Labs     08/28/22  0230   AST 15   ALT 14   BILITOT 0.9   ALKPHOS 171*       Radiology this visit:  Reviewed. CT ABDOMEN PELVIS W IV CONTRAST Additional Contrast? None    Result Date: 8/28/2022  EXAMINATION: CT OF THE ABDOMEN AND PELVIS WITH CONTRAST 8/28/2022 3:28 am TECHNIQUE: CT of the abdomen and pelvis was performed with the administration of intravenous contrast. Multiplanar reformatted images are provided for review. Automated exposure control, iterative reconstruction, and/or weight based adjustment of the mA/kV was utilized to reduce the radiation dose to as low as reasonably achievable. COMPARISON: 06/12/2021 HISTORY: ORDERING SYSTEM PROVIDED HISTORY: Vomiting TECHNOLOGIST PROVIDED HISTORY: Reason for exam:->Vomiting Additional Contrast?->None Decision Support Exception - unselect if not a suspected or confirmed emergency medical condition->Emergency Medical Condition (MA) Reason for Exam: Emesis FINDINGS: Lower Chest: No cardiomegaly. No distal esophageal thickening is identified. No parenchymal infiltrate. Organs: Diffuse hepatic steatosis. The gallbladder, adrenal glands, and pancreas appear unremarkable. No enhancing renal mass. Nonobstructive left renal calculi. No ureteral calculi are identified. No periureteral stranding is seen. GI/Bowel: Visualized loops of bowel appear normal in caliber. No ileus or obstruction. No appendicitis or diverticulitis is identified. Pelvis: No pelvic mass. Bladder appears unremarkable. No free fluid in the pelvis. No bulky pelvic lymphadenopathy is identified. Peritoneum/Retroperitoneum: No abdominal aortic aneurysm. No dissection. No retroperitoneal or mesenteric lymphadenopathy is identified. Hernia plug repair noted bilaterally within the inguinal canal region.  Bones/Soft Tissues: Prominent inguinal lymph nodes noted bilaterally, though smaller on the left than compared to the previous examination. The lymph node on the right is mildly increased in size. These may be reactive. 1. No ileus or obstruction is identified. No acute inflammatory bowel wall thickening is seen. 2. Mild fatty infiltration seen within the liver. 3. Nonobstructive left renal calculi.          Discharge Time of < 30 minutes    Electronically signed by RAFAEL Reyes CNP on 8/29/2022 at 8:59 AM

## 2022-08-29 NOTE — CONSULTS
[facility] Continence Nurse  Consult Note       Jeremi Daley  AGE: 39 y.o.    GENDER: male  : 1980  TODAY'S DATE:  2022    Subjective:     Reason for Evaluation and Assessment: wound care eval.      Jeremi Daley is a 39 y.o. male referred by:   [x] Physician  [] Nursing  [] Other:     Wound Identification:  Wound Type: diabetic, pressure, and non-healing surgical  Contributing Factors: diabetes and chronic pressure        PAST MEDICAL HISTORY        Diagnosis Date    Abscess of left foot 2022    Anemia associated with acute blood loss 2022    Callus of foot     Right foot - see's Dr. Beatrice Dukes    Cellulitis of left foot 2022    Diabetic ulcer of left midfoot associated with type 2 diabetes mellitus, with muscle involvement without evidence of necrosis ([address] 2022    Diabetic ulcer of left midfoot associated with type 2 diabetes mellitus, with necrosis of muscle ([address] 2021    Diabetic ulcer of right midfoot associated with type 2 diabetes mellitus, with fat layer exposed ([address] 2020    Dizziness     positional    Essential hypertension     Follows with PCP    Hyperlipidemia     Lumbar radiculopathy     Septic embolism ([address] 2020    Subacute osteomyelitis of left foot ([address] 2022       PAST SURGICAL HISTORY    Past Surgical History:   Procedure Laterality Date    ACHILLES TENDON SURGERY Right 2021    RIGHT ACHILLES TENDON LENGTHENING REPAIR performed by Pawel Singh DPM at [address] [facility]  2018    [address] [place] [address] 51    DENTAL SURGERY      teeth extractions -half per patient    HERNIA REPAIR Bilateral 2020    BIALTERAL HERNIA INGUINAL REPAIR performed by Aisha Borges MD at [facility] 2018    [place] OFFICE/OUTPT VISIT,PROCEDURE ONLY Left 2018    L5-S1 HEMILAMINECTOMY, REMOVAL OF DISC LEFT SIDE performed by Mellissa Aguero MD at [address] [place] [address] Right 2021    RIGHT TRANSMETATARSAL TOE AMPUTATION performed by Juan Raphael DPM at [address] Left 4/23/2022    LEFT RAY GREAT TOE AMPUTATION performed by Manuel Tobar MD at [address]    Family History   Problem Relation Age of Onset    Heart Disease Father     Diabetes Father     Diabetes Mother     Heart Disease Mother     Other Mother     Kidney Disease Mother     Heart Attack Mother        SOCIAL HISTORY    Social History     Tobacco Use    Smoking status: Never    Smokeless tobacco: Never   Vaping Use    Vaping Use: Never used   Substance Use Topics    Alcohol use: Yes     Comment: \"couple beers a night\"    Drug use: No       ALLERGIES    No Known Allergies    MEDICATIONS    No current facility-administered medications on file prior to encounter. Current Outpatient Medications on File Prior to Encounter   Medication Sig Dispense Refill    sucralfate (CARAFATE) 1 GM/10ML suspension Take 10 mLs by mouth 4 times daily for 7 days 414 mL 0    glyBURIDE (DIABETA) 5 MG tablet Take 2 tablets by mouth in the morning and at bedtime 120 tablet 1    Dulaglutide 1.5 MG/0.5ML SOPN Inject 1.5 mg into the skin once a week 5 pen 3    omeprazole (PRILOSEC) 20 MG delayed release capsule Take 1 capsule by mouth once daily in the morning 90 capsule 1    metoprolol succinate (TOPROL XL) 25 MG extended release tablet Take 1 tablet by mouth in the morning. 90 tablet 1    losartan (COZAAR) 25 MG tablet Take 1 tablet by mouth in the morning. 90 tablet 1    [DISCONTINUED] metFORMIN (GLUCOPHAGE) 500 MG tablet Take 2 tablets by mouth in the morning and 2 tablets in the evening. Take with meals. Indications: Start tomorrow 03/14. 360 tablet 1    FLUoxetine (PROZAC) 40 MG capsule Take 2 capsules by mouth in the morning.  30 capsule 3    insulin glargine (LANTUS SOLOSTAR) 100 UNIT/ML injection pen Inject 30 Units into the skin nightly 5 pen 2    collagenase 250 UNIT/GM ointment Per instructions DAILY (route: topical)      Alcohol Swabs PADS       pioglitazone (ACTOS) 30 MG tablet Take 1 tablet by mouth daily 90 tablet 1    atorvastatin (LIPITOR) 40 MG tablet Take 1 tablet by mouth nightly 90 tablet 1    blood glucose monitor strips Test 2 times a day & as needed for symptoms of irregular blood glucose. Dispense sufficient amount for indicated testing frequency plus additional to accommodate PRN testing needs. 100 strip 0    blood glucose monitor kit and supplies Dispense sufficient amount for indicated testing frequency plus additional to accommodate PRN testing needs. Dispense all needed supplies to include: monitor, strips, lancing device, lancets, control solutions, alcohol swabs.  1 kit 0    FreeStyle Lancets MISC 1 each by Does not apply route daily 100 each 3    acetaminophen (TYLENOL) 325 MG tablet Take 2 tablets by mouth every 4 hours as needed for Pain or Fever 120 tablet 3         Objective:      BP (!) 147/87   Pulse 91   Temp 99.5 °F (37.5 °C) (Oral)   Resp 20   Ht 6' (1.829 m)   Wt 180 lb (81.6 kg)   SpO2 100%   BMI 24.41 kg/m²   Heraclio Risk Score: Heraclio Scale Score: 21    LABS    CBC:   Lab Results   Component Value Date/Time    WBC 10.5 08/29/2022 08:00 AM    RBC 4.35 08/29/2022 08:00 AM    HGB 12.8 08/29/2022 08:00 AM    HCT 40.3 08/29/2022 08:00 AM    MCV 92.6 08/29/2022 08:00 AM    MCH 29.4 08/29/2022 08:00 AM    MCHC 31.8 08/29/2022 08:00 AM    RDW 15.6 08/29/2022 08:00 AM     08/29/2022 08:00 AM    MPV 8.3 08/29/2022 08:00 AM     CMP:    Lab Results   Component Value Date/Time     08/29/2022 08:00 AM    K 4.1 08/29/2022 08:00 AM    K 3.8 03/14/2018 06:01 AM     08/29/2022 08:00 AM    CO2 24 08/29/2022 08:00 AM    BUN 7 08/29/2022 08:00 AM    CREATININE 0.9 08/29/2022 08:00 AM    GFRAA >60 08/29/2022 08:00 AM    AGRATIO 1.4 03/12/2020 08:26 AM    LABGLOM >60 08/29/2022 08:00 AM    GLUCOSE 135 08/29/2022 08:00 AM    PROT 8.7 08/28/2022 02:30 AM    LABALBU 3.3 08/28/2022 02:30 AM    CALCIUM 7.2 08/29/2022 08:00 AM    BILITOT 0.9 08/28/2022 02:30 AM    ALKPHOS 171 08/28/2022 02:30 AM    AST 15 08/28/2022 02:30 AM    ALT 14 08/28/2022 02:30 AM     Albumin:    Lab Results   Component Value Date/Time    LABALBU 3.3 08/28/2022 02:30 AM     PT/INR:    Lab Results   Component Value Date/Time    PROTIME 11.5 05/20/2022 11:04 PM    INR 0.89 05/20/2022 11:04 PM     HgBA1c:    Lab Results   Component Value Date/Time    LABA1C 10.5 07/28/2022 10:45 AM         Assessment:     Patient Active Problem List   Diagnosis    Unstable angina (HCC)    Lumbar back pain with radiculopathy affecting left lower extremity    S/P lumbar laminectomy    Type 2 diabetes mellitus with diabetic neuropathy, without long-term current use of insulin (HCC)    Essential hypertension    Gastroesophageal reflux disease without esophagitis    Chest pain    Moderate nonproliferative diabetic retinopathy of both eyes without macular edema associated with type 2 diabetes mellitus (HCC)    Acute osteomyelitis (HCC)    Acute osteomyelitis of foot (HCC)    Abscess of left foot    Cellulitis of left foot    Subacute osteomyelitis of left foot (HCC)    Anemia associated with acute blood loss    Anxiety attack    WD-Diabetic ulcer of left midfoot associated with type 2 diabetes mellitus, with fat layer exposed ([address]    WD-Diabetic ulcer of right midfoot associated with type 2 diabetes mellitus, with fat layer exposed ([address]    WD-Partial nontraumatic amputation of foot, left (HCC)    Severe episode of recurrent major depressive disorder, without psychotic features ([address]    Insomnia    Intractable vomiting with nausea       Measurements:  Negative Pressure Wound Therapy (Active)   Number of days: 96       Incision 04/17/18 Back (Active)   Number of days: 2115       Wound 06/13/20 Tibial Right pt states reddened and rash like after a sunburn 6/11/2020 (Active)   Number of days: 806       Wound 05/11/22 #1 (onset 2 weeks) Left Medial Foot Amp Site (Active)   Wound Image   08/29/22 1044   Wound Etiology Skin Tear 08/29/22 1044   Dressing Status New dressing applied 08/29/22 1044   Wound Cleansed Cleansed with saline 08/29/22 1044   Dressing/Treatment Collagen;Hydrofiber Ag;ABD;Roll gauze;Tape change 08/29/22 1044   Offloading for Diabetic Foot Ulcers Diabetic shoes/inserts 08/25/22 0935   Wound Length (cm) 3.5 cm 08/29/22 1044   Wound Width (cm) 0.9 cm 08/29/22 1044   Wound Depth (cm) 1 cm 08/29/22 1044   Wound Surface Area (cm^2) 3.15 cm^2 08/29/22 1044   Change in Wound Size % (l*w) 93.88 08/29/22 1044   Wound Volume (cm^3) 3.15 cm^3 08/29/22 1044   Wound Healing % 88 08/29/22 1044   Post-Procedure Length (cm) 4.8 cm 08/22/22 1104   Post-Procedure Width (cm) 1.2 cm 08/22/22 1104   Post-Procedure Depth (cm) 0.4 cm 08/22/22 1104   Post-Procedure Surface Area (cm^2) 5.76 cm^2 08/22/22 1104   Post-Procedure Volume (cm^3) 2. 304 cm^3 08/22/22 1104   Distance Tunneling (cm) 0 cm 08/29/22 1044   Tunneling Position ___ O'Clock 0 08/29/22 1044   Undermining Starts ___ O'Clock 2 08/29/22 1044   Undermining Ends___ O'Clock 6 08/29/22 1044   Undermining Maxium Distance (cm) 1 08/29/22 1044   Wound Assessment Pink/red 08/29/22 1044   Drainage Amount Moderate 08/29/22 1044   Drainage Description Serosanguinous 08/29/22 1044   Odor None 08/29/22 1044   Rosalie-wound Assessment Maceration 08/29/22 1044   Margins Defined edges 08/29/22 1044   Wound Thickness Description not for Pressure Injury Full thickness 08/29/22 1044   Number of days: 110       Wound 05/11/22 #2 (onset unknown) Left Plantar (Active)   Wound Image   08/29/22 1044   Wound Etiology Diabetic 08/29/22 1044   Dressing Status New dressing applied 08/29/22 1044   Wound Cleansed Cleansed with saline 08/29/22 1044   Dressing/Treatment Collagen;Hydrofiber Ag;ABD;Roll gauze 08/29/22 1044   Offloading for Diabetic Foot Ulcers Diabetic shoes/inserts 08/25/22 0935   Wound Length (cm) 1.5 cm 08/29/22 1044   Wound Width (cm) 1.5 cm 08/29/22 1044   Wound Depth (cm) 1 cm 08/29/22 1044   Wound Surface Area (cm^2) 2.25 cm^2 08/29/22 1044   Change in Wound Size % (l*w) -40.63 08/29/22 1044   Wound Volume (cm^3) 2.25 cm^3 08/29/22 1044   Wound Healing % -603 08/29/22 1044   Post-Procedure Length (cm) 1.8 cm 08/22/22 1104   Post-Procedure Width (cm) 1.6 cm 08/22/22 1104   Post-Procedure Depth (cm) 0.3 cm 08/22/22 1104   Post-Procedure Surface Area (cm^2) 2.88 cm^2 08/22/22 1104   Post-Procedure Volume (cm^3) 0.864 cm^3 08/22/22 1104   Distance Tunneling (cm) 0 cm 08/29/22 1044   Tunneling Position ___ O'Clock 0 08/29/22 1044   Undermining Starts ___ O'Clock 1 08/29/22 1044   Undermining Ends___ O'Clock 6 08/29/22 1044   Undermining Maxium Distance (cm) 1 08/29/22 1044   Wound Assessment Pink/red 08/29/22 1044   Drainage Amount Moderate 08/29/22 1044   Drainage Description Serosanguinous 08/29/22 1044   Odor Mild 08/29/22 1044   Rosalie-wound Assessment Maceration 08/29/22 1044   Margins Defined edges 08/29/22 1044   Wound Thickness Description not for Pressure Injury Full thickness 08/29/22 1044   Number of days: 110       Wound 05/11/22 #3 (onset unknown) Right Plantar (Active)   Wound Image   08/29/22 1044   Wound Etiology Diabetic 08/29/22 1044   Dressing Status New dressing applied 08/29/22 1044   Wound Cleansed Cleansed with saline 08/29/22 1044   Dressing/Treatment Hydrofiber Ag;ABD;Roll gauze 08/29/22 1044   Offloading for Diabetic Foot Ulcers Diabetic shoes/inserts 08/25/22 0935   Wound Length (cm) 1 cm 08/29/22 1044   Wound Width (cm) 0.3 cm 08/29/22 1044   Wound Depth (cm) 1.3 cm 08/29/22 1044   Wound Surface Area (cm^2) 0.3 cm^2 08/29/22 1044   Change in Wound Size % (l*w) 77.78 08/29/22 1044   Wound Volume (cm^3) 0.39 cm^3 08/29/22 1044   Wound Healing % 28 08/29/22 1044   Post-Procedure Length (cm) 1 cm 08/22/22 1104   Post-Procedure Width (cm) 0.7 cm 08/22/22 1104   Post-Procedure Depth (cm) 1 cm 08/22/22 1104   Post-Procedure Surface Area (cm^2) 0.7 cm^2 08/22/22 1104   Post-Procedure Volume (cm^3) 0.7 cm^3 08/22/22 1104   Distance Tunneling (cm) 0 cm 08/29/22 1044   Tunneling Position ___ O'Clock 0 08/29/22 1044   Undermining Starts ___ O'Clock 0 08/29/22 1044   Undermining Ends___ O'Clock 0 08/29/22 1044   Undermining Maxium Distance (cm) 0 08/29/22 1044   Wound Assessment Pink/red 08/29/22 1044   Drainage Amount Large 08/29/22 1044   Drainage Description Serosanguinous 08/29/22 1044   Odor Malodorous/putrid 08/29/22 1044   Rosalie-wound Assessment Maceration 08/29/22 1044   Margins Defined edges 08/29/22 1044   Wound Thickness Description not for Pressure Injury Full thickness 08/29/22 1044   Number of days: 110       Response to treatment:  Well tolerated by patient. Pain Assessment:  Severity:  none  Quality of pain:   Wound Pain Timing/Severity:   Premedicated: no    Plan:     Plan of Care: Wound 05/11/22 #1 (onset 2 weeks) Left Medial Foot Amp Site-Dressing/Treatment: Collagen, Hydrofiber Ag, ABD, Roll gauze, Tape change  Wound 05/11/22 #2 (onset unknown) Left Plantar-Dressing/Treatment: Collagen, Hydrofiber Ag, ABD, Roll gauze  Wound 05/11/22 #3 (onset unknown) Right Plantar-Dressing/Treatment: Hydrofiber Ag, ABD, Roll gauze    Patient in bed agreeable to wound care eval rt and lt foot wounds chronic diabetic. Dressings removed washed feet with soap and water cleansed wounds with NS. Wounds to rt plantar/lt plantar and lt amp site. Odor to wounds and macerated. Measured and pictured. Applied new dressings collagen and aquacel ag. Pt being discharged today.      Specialty Bed Required : no  [] Low Air Loss   [] Pressure Redistribution  [] Fluid Immersion  [] Bariatric  [] Total Pressure Relief  [] Other:     Discharge Plan:  Placement for patient upon discharge: home  Hospice Care: no  Patient appropriate for Outpatient [address] [place] [address] Road:  yes to continue after discharge    Patient/Caregiver Teaching:  Level of patient/caregiver understanding able to: pt voiced understanding. Electronically signed by Tamara Gatica.  MEGAN Parra,  on 8/29/2022 at 1:17 PM

## 2022-08-29 NOTE — PROGRESS NOTES
Outpatient Pharmacy Progress Note for Meds-to-Beds    Total number of Prescriptions Filled: 2  The following medications were dispensed to the patient during the discharge process: Ondansetron  Metoclopramide     Additional Documentation:  Medication(s) were delivered to the patient's room prior to discharge      Thank you for letting us serve your patients.   [facility]    [address], [address] [facility]    Phone: [phone]    Fax: [phone]

## 2022-08-30 ENCOUNTER — TELEPHONE (OUTPATIENT)
Dept: FAMILY MEDICINE CLINIC | Age: 42
End: 2022-08-30

## 2022-08-30 ENCOUNTER — CARE COORDINATION (OUTPATIENT)
Dept: CARE COORDINATION | Age: 42
End: 2022-08-30

## 2022-08-30 ENCOUNTER — HOSPITAL ENCOUNTER (OUTPATIENT)
Dept: WOUND CARE | Age: 42
Discharge: HOME | DRG: 254 | End: 2022-08-30
Payer: COMMERCIAL

## 2022-08-30 VITALS
TEMPERATURE: 99 F | RESPIRATION RATE: 20 BRPM | DIASTOLIC BLOOD PRESSURE: 94 MMHG | SYSTOLIC BLOOD PRESSURE: 165 MMHG | HEART RATE: 106 BPM

## 2022-08-30 DIAGNOSIS — E11.621 DIABETIC ULCER OF RIGHT MIDFOOT ASSOCIATED WITH TYPE 2 DIABETES MELLITUS, WITH FAT LAYER EXPOSED (HCC): ICD-10-CM

## 2022-08-30 DIAGNOSIS — L03.116 CELLULITIS OF LEFT FOOT: ICD-10-CM

## 2022-08-30 DIAGNOSIS — Z89.432 PARTIAL NONTRAUMATIC AMPUTATION OF FOOT, LEFT (HCC): ICD-10-CM

## 2022-08-30 DIAGNOSIS — E11.621 DIABETIC ULCER OF LEFT MIDFOOT ASSOCIATED WITH TYPE 2 DIABETES MELLITUS, WITH FAT LAYER EXPOSED (HCC): Primary | ICD-10-CM

## 2022-08-30 DIAGNOSIS — L97.412 DIABETIC ULCER OF RIGHT MIDFOOT ASSOCIATED WITH TYPE 2 DIABETES MELLITUS, WITH FAT LAYER EXPOSED (HCC): ICD-10-CM

## 2022-08-30 DIAGNOSIS — L97.422 DIABETIC ULCER OF LEFT MIDFOOT ASSOCIATED WITH TYPE 2 DIABETES MELLITUS, WITH FAT LAYER EXPOSED (HCC): Primary | ICD-10-CM

## 2022-08-30 PROCEDURE — 0JBR0ZZ EXCISION OF LEFT FOOT SUBCUTANEOUS TISSUE AND FASCIA, OPEN APPROACH: ICD-10-PCS | Performed by: SURGERY

## 2022-08-30 PROCEDURE — 11042 DBRDMT SUBQ TIS 1ST 20SQCM/<: CPT

## 2022-08-30 PROCEDURE — 11042 DBRDMT SUBQ TIS 1ST 20SQCM/<: CPT | Performed by: NURSE PRACTITIONER

## 2022-08-30 RX ORDER — LIDOCAINE 50 MG/G
OINTMENT TOPICAL ONCE
Status: CANCELLED | OUTPATIENT
Start: 2022-08-30 | End: 2022-08-30

## 2022-08-30 RX ORDER — CLOBETASOL PROPIONATE 0.5 MG/G
OINTMENT TOPICAL ONCE
Status: CANCELLED | OUTPATIENT
Start: 2022-08-30 | End: 2022-08-30

## 2022-08-30 RX ORDER — GENTAMICIN SULFATE 1 MG/G
OINTMENT TOPICAL ONCE
Status: CANCELLED | OUTPATIENT
Start: 2022-08-30 | End: 2022-08-30

## 2022-08-30 RX ORDER — BACITRACIN ZINC AND POLYMYXIN B SULFATE 500; 1000 [USP'U]/G; [USP'U]/G
OINTMENT TOPICAL ONCE
Status: CANCELLED | OUTPATIENT
Start: 2022-08-30 | End: 2022-08-30

## 2022-08-30 RX ORDER — GINSENG 100 MG
CAPSULE ORAL ONCE
Status: CANCELLED | OUTPATIENT
Start: 2022-08-30 | End: 2022-08-30

## 2022-08-30 RX ORDER — LIDOCAINE HYDROCHLORIDE 20 MG/ML
JELLY TOPICAL ONCE
Status: CANCELLED | OUTPATIENT
Start: 2022-08-30 | End: 2022-08-30

## 2022-08-30 RX ORDER — BETAMETHASONE DIPROPIONATE 0.05 %
OINTMENT (GRAM) TOPICAL ONCE
Status: CANCELLED | OUTPATIENT
Start: 2022-08-30 | End: 2022-08-30

## 2022-08-30 RX ORDER — BACITRACIN, NEOMYCIN, POLYMYXIN B 400; 3.5; 5 [USP'U]/G; MG/G; [USP'U]/G
OINTMENT TOPICAL ONCE
Status: CANCELLED | OUTPATIENT
Start: 2022-08-30 | End: 2022-08-30

## 2022-08-30 RX ORDER — LIDOCAINE HYDROCHLORIDE 40 MG/ML
SOLUTION TOPICAL ONCE
Status: CANCELLED | OUTPATIENT
Start: 2022-08-30 | End: 2022-08-30

## 2022-08-30 RX ORDER — LIDOCAINE 40 MG/G
CREAM TOPICAL ONCE
Status: CANCELLED | OUTPATIENT
Start: 2022-08-30 | End: 2022-08-30

## 2022-08-30 NOTE — CARE COORDINATION
Received return phone call from Janette Armando who reported he hasn't been feeling well, discussed his recent hospitalization. Janette Armando reported he is still considering options for housing, stating he is not sure if he will be able to remain in his current home. Pt reported he is in need of a new refrigerator, stating his  and he had to throw out his food. SW discussed plans to assist Janette Armando in finding a new refrigerator. Attempted phone call to [facility], voicemail message left requesting return phone call, contact number provided. Phone call to [employer] who reported they do not currently have any refrigerators. Phone call to [address] [place] [address] who reported to call emergency line [phone] Tuesday or Friday between 10am-10:30am    Phone call to [facility] and spoke with Mirna Iglesias who stated the only option that came up was [facility]. Phone call to Janette Armando to provide him with information for [facility]. Janette Armando reported he would call them Friday. KOLE plan of care:  SW will follow up with Pt on  regarding refrigerator, housing, utilities.

## 2022-08-30 NOTE — PROGRESS NOTES
[facility] Progress Note With Procedure    Truman Garcia  AGE: 39 y.o. GENDER: male  : 1980  EPISODE DATE:  2022     Subjective:     Chief Complaint   Patient presents with    Wound Check     Aly feet         HISTORY of PRESENT ILLNESS      Truman Garcia is a 39 y.o. male who presents today for wound evaluation of Chronic diabetic, pressure and non-healing surgical ulcer(s) of the left medial foot and lateral plantar surface. The ulcer is of marked severity. The underlying cause of the wound is diabetes, non-healing surgical. He presents today with a new wound to the right plantar foot that is diabetic and pressure in nature and of moderate severity. The patient has significant underlying medical conditions as below. The patient is having significant depression related to the recent and sudden death of his mother. He was last here to the wound clinic 22. He does have some redness to the dorsal left foot will treat for cellulitis. Wound Pain Timing/Severity: none  Quality of pain: N/A  Severity of pain:  0 / 10   Modifying Factors: diabetes and chronic pressure  Associated Signs/Symptoms: drainage     Diabetes: Yes, on an oral and insulin regimen, last A1c 10.5 as of 22  Diabetes education provided today:    Diabetes pathoetiology, difference between type 1 and type 2 diabetes, and progressive nature of Type 2 DM. Diabetic Neuropathy: signs and therapy. Foot care: advised to wash feet daily, pat dry and apply lotion at night, avoiding between toes. Need to look at feet daily and report to a physician any signs of inflammation or skin damage. Discussed diabetes shoes and socks.   Diabetic management related to wound care    Smoking: Never smoker  Obesity:No  Anticoagulant therapy: No  Immunosuppression: No    Patient educated on the 6 essential components necessary for wound healing: Circulation, Debridements, Proper Dressings and Topical Wound Products, Infection Control, Edema 4/22/2022    Diabetic ulcer of left midfoot associated with type 2 diabetes mellitus, with muscle involvement without evidence of necrosis ([address] 4/26/2022    Diabetic ulcer of left midfoot associated with type 2 diabetes mellitus, with necrosis of muscle ([address] 6/7/2021    Diabetic ulcer of right midfoot associated with type 2 diabetes mellitus, with fat layer exposed ([address] 8/11/2020    Dizziness     positional    Essential hypertension     Follows with PCP    Hyperlipidemia     Lumbar radiculopathy     Septic embolism ([address] 6/13/2020    Subacute osteomyelitis of left foot ([address] 4/22/2022       PAST SURGICAL HISTORY    Past Surgical History:   Procedure Laterality Date    ACHILLES TENDON SURGERY Right 1/8/2021    RIGHT ACHILLES TENDON LENGTHENING REPAIR performed by Lowry Barthel, DPM at [facility]  03/2018    [facility]    DENTAL SURGERY      teeth extractions -half per patient    HERNIA REPAIR Bilateral 5/27/2020    BIALTERAL HERNIA INGUINAL REPAIR performed by Chioma Carter MD at [facility] 30 2018    MI OFFICE/OUTPT VISIT,PROCEDURE ONLY Left 4/17/2018    L5-S1 HEMILAMINECTOMY, REMOVAL OF [address] LEFT SIDE performed by Yuriy Alvarez MD at [address] Right 1/8/2021    RIGHT TRANSMETATARSAL TOE AMPUTATION performed by Lowry Barthel, DPM at [address] Left 4/23/2022    LEFT RAY GREAT TOE AMPUTATION performed by Martin Workman MD [address]    Family History   Problem Relation Age of Onset    Heart Disease Father     Diabetes Father     Diabetes Mother     Heart Disease Mother     Other Mother     Kidney Disease Mother     Heart Attack Mother        SOCIAL HISTORY    Social History     Tobacco Use    Smoking status: Never    Smokeless tobacco: Never   Vaping Use    Vaping Use: Never used   Substance Use Topics    Alcohol use: Yes     Comment: \"couple beers a night\"    Drug use: No       ALLERGIES    No Known Allergies    MEDICATIONS    Current Outpatient Medications on File Prior to Encounter   Medication Sig Dispense Refill    metoclopramide (REGLAN) 10 MG tablet Take 1 tablet by mouth 4 times daily (before meals and nightly) 120 tablet 3    ondansetron (ZOFRAN-ODT) 4 MG disintegrating tablet Take 1 tablet by mouth every 8 hours as needed for Nausea or Vomiting 14 tablet 0    sucralfate (CARAFATE) 1 GM/10ML suspension Take 10 mLs by mouth 4 times daily for 7 days 414 mL 0    glyBURIDE (DIABETA) 5 MG tablet Take 2 tablets by mouth in the morning and at bedtime 120 tablet 1    Dulaglutide 1.5 MG/0.5ML SOPN Inject 1.5 mg into the skin once a week 5 pen 3    omeprazole (PRILOSEC) 20 MG delayed release capsule Take 1 capsule by mouth once daily in the morning 90 capsule 1    metoprolol succinate (TOPROL XL) 25 MG extended release tablet Take 1 tablet by mouth in the morning. 90 tablet 1    losartan (COZAAR) 25 MG tablet Take 1 tablet by mouth in the morning. 90 tablet 1    [DISCONTINUED] metFORMIN (GLUCOPHAGE) 500 MG tablet Take 2 tablets by mouth in the morning and 2 tablets in the evening. Take with meals. Indications: Start tomorrow 03/14. 360 tablet 1    FLUoxetine (PROZAC) 40 MG capsule Take 2 capsules by mouth in the morning. 30 capsule 3    insulin glargine (LANTUS SOLOSTAR) 100 UNIT/ML injection pen Inject 30 Units into the skin nightly 5 pen 2    collagenase 250 UNIT/GM ointment Per instructions DAILY (route: topical)      Alcohol Swabs PADS       pioglitazone (ACTOS) 30 MG tablet Take 1 tablet by mouth daily 90 tablet 1    atorvastatin (LIPITOR) 40 MG tablet Take 1 tablet by mouth nightly 90 tablet 1    blood glucose monitor strips Test 2 times a day & as needed for symptoms of irregular blood glucose. Dispense sufficient amount for indicated testing frequency plus additional to accommodate PRN testing needs.  100 strip 0    blood glucose monitor kit and supplies Dispense Rossie Bumpers, APRN - CNP    Consent obtained: Yes    Time out taken:  Yes    Pain Control: Anesthetic  Anesthetic: 4% Lidocaine Liquid Topical      Debridement:Excisional Debridement    Using curette and tissue nippers the wound(s) was/were sharply debrided down through and including the removal of subcutaneous tissue.         Devitalized Tissue Debrided:  slough, exudate and callus    Pre Debridement Measurements:  Are located in the Wound Documentation Flow Sheet    All active wounds listed below with today's date are evaluated  Wound(s)    debrided this date include # : 1, 2 & 3    Post  Debridement Measurements:  Wound 05/11/22 #1 (onset 2 weeks) Left Medial Foot Amp Site (Active)   Wound Image   08/29/22 1044   Wound Etiology Skin Tear 08/30/22 1322   Dressing Status New dressing applied 08/30/22 1429   Wound Cleansed Wound cleanser 08/30/22 1322   Dressing/Treatment Collagen;Hydrofiber Ag;ABD;Roll gauze;Tape change 08/29/22 1044   Offloading for Diabetic Foot Ulcers Diabetic shoes/inserts 08/30/22 1429   Wound Length (cm) 4 cm 08/30/22 1322   Wound Width (cm) 1 cm 08/30/22 1322   Wound Depth (cm) 0.6 cm 08/30/22 1322   Wound Surface Area (cm^2) 4 cm^2 08/30/22 1322   Change in Wound Size % (l*w) 92.23 08/30/22 1322   Wound Volume (cm^3) 2.4 cm^3 08/30/22 1322   Wound Healing % 91 08/30/22 1322   Post-Procedure Length (cm) 4 cm 08/30/22 1343   Post-Procedure Width (cm) 1 cm 08/30/22 1343   Post-Procedure Depth (cm) 0.6 cm 08/30/22 1343   Post-Procedure Surface Area (cm^2) 4 cm^2 08/30/22 1343   Post-Procedure Volume (cm^3) 2.4 cm^3 08/30/22 1343   Distance Tunneling (cm) 0 cm 08/30/22 1322   Tunneling Position ___ O'Clock 0 08/30/22 1322   Undermining Starts ___ O'Clock 2 08/30/22 1322   Undermining Ends___ O'Clock 6 08/30/22 1322   Undermining Maxium Distance (cm) 1 08/30/22 1322   Wound Assessment [place]/red;Slough 08/30/22 1322   Drainage Amount Moderate 08/30/22 1322   Drainage Description Yellow 08/30/22 1322   Odor None 08/30/22 1322   Rosalie-wound Assessment Intact 08/30/22 1322   Margins Defined edges 08/30/22 1322   Wound Thickness Description not for Pressure Injury Full thickness 08/30/22 1322   Number of days: 111       Wound 05/11/22 #2 (onset unknown) Left Plantar (Active)   Wound Image   08/29/22 1044   Wound Etiology Diabetic 08/29/22 1044   Dressing Status New dressing applied 08/30/22 1429   Wound Cleansed Cleansed with saline 08/29/22 1044   Dressing/Treatment Collagen;Hydrofiber Ag;ABD;Roll gauze 08/29/22 1044   Offloading for Diabetic Foot Ulcers Diabetic shoes/inserts 08/30/22 1429   Wound Length (cm) 1 cm 08/30/22 1322   Wound Width (cm) 1.5 cm 08/30/22 1322   Wound Depth (cm) 1 cm 08/30/22 1322   Wound Surface Area (cm^2) 1.5 cm^2 08/30/22 1322   Change in Wound Size % (l*w) 6.25 08/30/22 1322   Wound Volume (cm^3) 1.5 cm^3 08/30/22 1322   Wound Healing % -369 08/30/22 1322   Post-Procedure Length (cm) 1 cm 08/30/22 1343   Post-Procedure Width (cm) 1.5 cm 08/30/22 1343   Post-Procedure Depth (cm) 1 cm 08/30/22 1343   Post-Procedure Surface Area (cm^2) 1.5 cm^2 08/30/22 1343   Post-Procedure Volume (cm^3) 1.5 cm^3 08/30/22 1343   Distance Tunneling (cm) 0 cm 08/29/22 1044   Tunneling Position ___ O'Clock 0 08/29/22 1044   Undermining Starts ___ O'Clock 1 08/29/22 1044   Undermining Ends___ O'Clock 6 08/29/22 1044   Undermining Maxium Distance (cm) 1 08/29/22 1044   Wound Assessment Pink/red 08/29/22 1044   Drainage Amount Moderate 08/29/22 1044   Drainage Description Serosanguinous 08/29/22 1044   Odor Mild 08/29/22 1044   Rosalie-wound Assessment Maceration 08/29/22 1044   Margins Defined edges 08/29/22 1044   Wound Thickness Description not for Pressure Injury Full thickness 08/29/22 1044   Number of days: 111       Wound 05/11/22 #3 (onset unknown) Right Plantar (Active)   Wound Image   08/29/22 1044   Wound Etiology Diabetic 08/30/22 1322   Dressing Status New dressing applied 08/30/22 1429   Wound Cleansed Cleansed with saline 08/30/22 1322   Dressing/Treatment Hydrofiber Ag;ABD;Roll gauze 08/29/22 1044   Offloading for Diabetic Foot Ulcers Diabetic shoes/inserts 08/30/22 1429   Wound Length (cm) 1 cm 08/30/22 1322   Wound Width (cm) 0.3 cm 08/30/22 1322   Wound Depth (cm) 0.9 cm 08/30/22 1322   Wound Surface Area (cm^2) 0.3 cm^2 08/30/22 1322   Change in Wound Size % (l*w) 77.78 08/30/22 1322   Wound Volume (cm^3) 0.27 cm^3 08/30/22 1322   Wound Healing % 50 08/30/22 1322   Post-Procedure Length (cm) 1 cm 08/30/22 1343   Post-Procedure Width (cm) 0.3 cm 08/30/22 1343   Post-Procedure Depth (cm) 0.9 cm 08/30/22 1343   Post-Procedure Surface Area (cm^2) 0.3 cm^2 08/30/22 1343   Post-Procedure Volume (cm^3) 0.27 cm^3 08/30/22 1343   Distance Tunneling (cm) 0 cm 08/30/22 1322   Tunneling Position ___ O'Clock 0 08/30/22 1322   Undermining Starts ___ O'Clock 0 08/30/22 1322   Undermining Ends___ O'Clock 00 08/30/22 1322   Undermining Maxium Distance (cm) 0 08/30/22 1322   Wound Assessment Pink/red 08/30/22 1322   Drainage Amount Large 08/30/22 1322   Drainage Description Serosanguinous 08/30/22 1322   Odor None 08/30/22 1322   Rosalie-wound Assessment Maceration 08/30/22 1322   Margins Defined edges 08/30/22 1322   Wound Thickness Description not for Pressure Injury Full thickness 08/30/22 1322   Number of days: 111     Percent of Wound(s) Debrided: approximately 100%    Total  Area  Debrided: 5.8 sq cm     Bleeding:  Minimal    Hemostasis Achieved:  not needed    Procedural Pain:  0  / 10     Post Procedural Pain:  0 / 10     Response to treatment:  Well tolerated by patient. Status of wound progress and description from last visit: The wounds have improved this week. Redness on the dorsal left foot has improved with Bactrim and Ceftin. The right plantar foot able to probe to bone will use stimulin and pack with ioplex. Off loading with quick around the periwounds along with compression wrap.  The patient continues to orellana depression post his mother's death. Advanced Modality Checklist: Skin substitutes    [x] Yes []  No  Is the wound Greater than 1.0 sq cm  [x] Yes []  No  Has the wound had Documented Treatment for 30 days ? [] Yes [x]  No  Recurrent Wound with Skin Substitute with Last Year?  [] Yes [x]  No  Radiographic testing in the last 3 months? [] Yes [x]  No  Vascular Assessment in the last 6 months? [x] Yes []  No  Smoking Status  [x] Yes []  No  Nutrition Assessed  [x] Yes []  No  Wound Free from infection   [x] Yes []  No  Is wound free of eschar, slough, and/or [place]? [] Yes [x]  No  Malignant Process in Wound  [] Yes []  No  Hemoglobin A1C in the last 3 months?  last A1c 10.4 as of 4/26/22  [x] Yes []  No  Off loading Diabetic Foot Ulcer? [x] Yes []  No Compression Therapy of 20 mmHg or Greater? Plan:       Discharge Instructions         PHYSICIAN ORDERS AND DISCHARGE INSTRUCTIONS     NOTE: Upon discharge from the [address], you will receive a patient experience survey. We would be grateful if you would take the time to fill this survey out. Wound cleansing:              Do not scrub or use excessive force. Wash hands with soap and water before and after dressing changes. Prior to applying a clean dressing, cleanse wound with normal saline, wound cleanser, or mild soap and water. Ask the physician or nurse before getting the wound(s) wet in a shower     Daily Wound management:              Keep weight off wounds and reposition every 2 hours. Avoid standing for long periods of time. Apply wraps/stockings in AM and remove at bedtime. If swelling is present, elevate legs to the level of the heart or above for 30 minutes 4-5 times a day and/or when sitting.                                       When taking antibiotics take entire prescription as ordered by physician do not stop taking until medicine is all gone.                              Wound Care Notes:  Rx: Bijan Pier  Apply for grafts 22: aLL GRAFTS approved 22 for Left Toe amp site  Reapply for graft 22 for Left toes amp site   YADY's   Right 1.07      Left    1.1           Date: 08/15/22           Grafts Left Foot Amp Site  Puraply #1 Graft #1 4x4 fenestrated 06/15/22   Puraply #2 Graft #2 4x4 fenestrated 22                                    Orders for this week:  22           Right and Left Plantar, and Left Great toe amp site--Clean with soap and water, pat  dry. Apply anasept gel and stimulen powder to wound bed. Pack Left foot with ioplex and stimulen and place ioplex on amp site   Cover with Ca Alginate and Sorbex. Wrap with Coban 2 Lite. Leave in place until nurse visit Friday            Nurse Visit Friday   Follow Up Instructions: At the [address] [place] [address] Road in 1 week: Tuesday   Primary Wound Care Provider: Porfirio Ordonez, CNP   Call  for any questions or concerns.   Central [phone]        Treatment Note Wound 22 #2 (onset unknown) Left Plantar-Dressing/Treatment:  (Anasept gel, Stimulen, Ioplex, Calcium Alginate, Sorbex, Coban 2 Lite)  Wound 22 #1 (onset 2 weeks) Left Medial Foot Amp Site-Dressing/Treatment:  (Anasept gel, Stimulen, Ioplex, Calcium Alginate, Sorbex, Coban 2 Lite)  Wound 22 #3 (onset unknown) Right Plantar-Dressing/Treatment:  (Anasept gel, Stimulen, Ioplex, Calcium Alginate, Sorbex, Coban 2 Lite)    Written Patient Dismissal Instructions Given            Electronically signed by Porfirio Ordonez, RAFAEL - CNP on 2022 at 3:57 PM

## 2022-08-30 NOTE — DISCHARGE INSTRUCTIONS
PHYSICIAN ORDERS AND DISCHARGE INSTRUCTIONS     NOTE: Upon discharge from the [address], you will receive a patient experience survey. We would be grateful if you would take the time to fill this survey out. Wound cleansing:              Do not scrub or use excessive force. Wash hands with soap and water before and after dressing changes. Prior to applying a clean dressing, cleanse wound with normal saline, wound cleanser, or mild soap and water. Ask the physician or nurse before getting the wound(s) wet in a shower     Daily Wound management:              Keep weight off wounds and reposition every 2 hours. Avoid standing for long periods of time. Apply wraps/stockings in AM and remove at bedtime. If swelling is present, elevate legs to the level of the heart or above for 30 minutes 4-5 times a day and/or when sitting. When taking antibiotics take entire prescription as ordered by physician do not stop taking until medicine is all gone. Wound Care Notes:  Rx: Lauren Burkett  Apply for grafts 05/11/22: aLL GRAFTS approved 06/08/22 for Left Toe amp site  Reapply for graft 08/22/22 for Left toes amp site   YADY's   Right 1.07      Left    1.1           Date: 08/15/22           Grafts Left Foot Amp Site  Puraply #1 Graft #1 4x4 fenestrated 06/15/22   Puraply #2 Graft #2 4x4 fenestrated 06/22/22                                    Orders for this week:  08/30/22           Right and Left Plantar, and Left Great toe amp site--Clean with soap and water, pat  dry. Apply anasept gel and stimulen powder to wound bed. Pack Left foot with ioplex and stimulen and place ioplex on amp site   Cover with Ca Alginate and Sorbex. Wrap with Coban 2 Lite. Leave in place until nurse visit Friday            Nurse Visit Friday   Follow Up Instructions:  At the [address] in 1 week: Tuesday   Primary Wound Care Provider: Yoli Nelson CNP   Call  for any questions or concerns.   Central [phone]

## 2022-08-30 NOTE — PROGRESS NOTES
Multilayer Compression Wrap   (Not Unna) Below the Knee    NAME:  Lisseth eNwman OF BIRTH:  1980  MEDICAL RECORD NUMBER:  [id number]  DATE:  8/30/2022    Multilayer compression wrap: Removed old Multilayer wrap if indicated and wash leg with mild soap/water. Applied moisturizing agent to dry skin as needed. Applied primary and secondary dressing as ordered. Applied multilayered dressing below the knee to right lower leg. Applied multilayered dressing below the knee to left lower leg. Instructed patient/caregiver not to remove dressing and to keep it clean and dry. Instructed patient/caregiver on complications to report to provider, such as pain, numbness in toes, heavy drainage, and slippage of dressing. Instructed patient on purpose of compression dressing and on activity and exercise recommendations.       Electronically signed by Deloris Manriquez LPN on 5/11/7104 at 3:22 PM

## 2022-08-30 NOTE — CARE COORDINATION
Attempted phone call to Adri Haney to follow up regarding housing / community resources. No answer, voicemail message left requesting return phone call, contact number provided. SW plan of care:  SW will follow up with Pt in one week on 9/6 regarding housing / community resources.

## 2022-08-31 ENCOUNTER — HOSPITAL ENCOUNTER (INPATIENT)
Age: 42
LOS: 1 days | Discharge: HOME | DRG: 254 | End: 2022-09-02
Attending: EMERGENCY MEDICINE | Admitting: INTERNAL MEDICINE
Payer: COMMERCIAL

## 2022-08-31 ENCOUNTER — APPOINTMENT (OUTPATIENT)
Dept: CT IMAGING | Age: 42
DRG: 254 | End: 2022-08-31
Payer: COMMERCIAL

## 2022-08-31 ENCOUNTER — TELEPHONE (OUTPATIENT)
Dept: FAMILY MEDICINE CLINIC | Age: 42
End: 2022-08-31

## 2022-08-31 DIAGNOSIS — R11.2 INTRACTABLE VOMITING WITH NAUSEA, UNSPECIFIED VOMITING TYPE: Primary | ICD-10-CM

## 2022-08-31 DIAGNOSIS — R10.30 LOWER ABDOMINAL PAIN: ICD-10-CM

## 2022-08-31 DIAGNOSIS — K63.89 PNEUMATOSIS INTESTINALIS: ICD-10-CM

## 2022-08-31 LAB
ALBUMIN SERPL-MCNC: 2.8 GM/DL (ref 3.4–5)
ALP BLD-CCNC: 125 IU/L (ref 40–129)
ALT SERPL-CCNC: 20 U/L (ref 10–40)
ANION GAP SERPL CALCULATED.3IONS-SCNC: 12 MMOL/L (ref 4–16)
AST SERPL-CCNC: 77 IU/L (ref 15–37)
BASE EXCESS: 0 (ref 0–3.3)
BASOPHILS ABSOLUTE: 0 K/CU MM
BASOPHILS RELATIVE PERCENT: 0.2 % (ref 0–1)
BILIRUB SERPL-MCNC: 1.1 MG/DL (ref 0–1)
BUN BLDV-MCNC: 11 MG/DL (ref 6–23)
CALCIUM SERPL-MCNC: 8.1 MG/DL (ref 8.3–10.6)
CHLORIDE BLD-SCNC: 92 MMOL/L (ref 99–110)
CO2: 23 MMOL/L (ref 21–32)
COMMENT: ABNORMAL
CREAT SERPL-MCNC: 1 MG/DL (ref 0.9–1.3)
DIFFERENTIAL TYPE: ABNORMAL
EOSINOPHILS ABSOLUTE: 0 K/CU MM
EOSINOPHILS RELATIVE PERCENT: 0.1 % (ref 0–3)
GFR AFRICAN AMERICAN: >60 ML/MIN/1.73M2
GFR NON-AFRICAN AMERICAN: >60 ML/MIN/1.73M2
GLUCOSE BLD-MCNC: 233 MG/DL (ref 70–99)
HCO3 VENOUS: 24.8 MMOL/L (ref 19–25)
HCT VFR BLD CALC: 39.4 % (ref 42–52)
HEMOGLOBIN: 13 GM/DL (ref 13.5–18)
IMMATURE NEUTROPHIL %: 0.5 % (ref 0–0.43)
LACTATE: 2.2 MMOL/L (ref 0.4–2)
LIPASE: 20 IU/L (ref 13–60)
LYMPHOCYTES ABSOLUTE: 0.7 K/CU MM
LYMPHOCYTES RELATIVE PERCENT: 5.1 % (ref 24–44)
MCH RBC QN AUTO: 29.4 PG (ref 27–31)
MCHC RBC AUTO-ENTMCNC: 33 % (ref 32–36)
MCV RBC AUTO: 89.1 FL (ref 78–100)
MONOCYTES ABSOLUTE: 0.9 K/CU MM
MONOCYTES RELATIVE PERCENT: 7.1 % (ref 0–4)
NUCLEATED RBC %: 0 %
O2 SAT, VEN: 81.6 % (ref 50–70)
PCO2, VEN: 41 MMHG (ref 38–52)
PDW BLD-RTO: 15.1 % (ref 11.7–14.9)
PH VENOUS: 7.39 (ref 7.32–7.42)
PLATELET # BLD: 390 K/CU MM (ref 140–440)
PMV BLD AUTO: 9.4 FL (ref 7.5–11.1)
PO2, VEN: 50 MMHG (ref 28–48)
POTASSIUM SERPL-SCNC: 6.2 MMOL/L (ref 3.5–5.1)
RBC # BLD: 4.42 M/CU MM (ref 4.6–6.2)
SEGMENTED NEUTROPHILS ABSOLUTE COUNT: 11.2 K/CU MM
SEGMENTED NEUTROPHILS RELATIVE PERCENT: 87 % (ref 36–66)
SODIUM BLD-SCNC: 127 MMOL/L (ref 135–145)
TOTAL IMMATURE NEUTOROPHIL: 0.07 K/CU MM
TOTAL NUCLEATED RBC: 0 K/CU MM
TOTAL PROTEIN: 9 GM/DL (ref 6.4–8.2)
WBC # BLD: 12.9 K/CU MM (ref 4–10.5)

## 2022-08-31 PROCEDURE — 82010 KETONE BODYS QUAN: CPT

## 2022-08-31 PROCEDURE — 96374 THER/PROPH/DIAG INJ IV PUSH: CPT

## 2022-08-31 PROCEDURE — 74177 CT ABD & PELVIS W/CONTRAST: CPT

## 2022-08-31 PROCEDURE — 6360000004 HC RX CONTRAST MEDICATION: Performed by: EMERGENCY MEDICINE

## 2022-08-31 PROCEDURE — 6360000002 HC RX W HCPCS: Performed by: EMERGENCY MEDICINE

## 2022-08-31 PROCEDURE — 84132 ASSAY OF SERUM POTASSIUM: CPT

## 2022-08-31 PROCEDURE — 99285 EMERGENCY DEPT VISIT HI MDM: CPT

## 2022-08-31 PROCEDURE — 2580000003 HC RX 258: Performed by: EMERGENCY MEDICINE

## 2022-08-31 PROCEDURE — 80053 COMPREHEN METABOLIC PANEL: CPT

## 2022-08-31 PROCEDURE — 82805 BLOOD GASES W/O2 SATURATION: CPT

## 2022-08-31 PROCEDURE — 83690 ASSAY OF LIPASE: CPT

## 2022-08-31 PROCEDURE — 85025 COMPLETE CBC W/AUTO DIFF WBC: CPT

## 2022-08-31 PROCEDURE — 96375 TX/PRO/DX INJ NEW DRUG ADDON: CPT

## 2022-08-31 PROCEDURE — 96361 HYDRATE IV INFUSION ADD-ON: CPT

## 2022-08-31 PROCEDURE — 83605 ASSAY OF LACTIC ACID: CPT

## 2022-08-31 PROCEDURE — 96372 THER/PROPH/DIAG INJ SC/IM: CPT

## 2022-08-31 RX ORDER — ONDANSETRON 2 MG/ML
4 INJECTION INTRAMUSCULAR; INTRAVENOUS ONCE
Status: COMPLETED | OUTPATIENT
Start: 2022-08-31 | End: 2022-08-31

## 2022-08-31 RX ORDER — 0.9 % SODIUM CHLORIDE 0.9 %
1000 INTRAVENOUS SOLUTION INTRAVENOUS ONCE
Status: COMPLETED | OUTPATIENT
Start: 2022-08-31 | End: 2022-08-31

## 2022-08-31 RX ORDER — MORPHINE SULFATE 4 MG/ML
4 INJECTION, SOLUTION INTRAMUSCULAR; INTRAVENOUS ONCE
Status: COMPLETED | OUTPATIENT
Start: 2022-08-31 | End: 2022-08-31

## 2022-08-31 RX ORDER — PROMETHAZINE HYDROCHLORIDE 25 MG/ML
25 INJECTION, SOLUTION INTRAMUSCULAR; INTRAVENOUS ONCE
Status: COMPLETED | OUTPATIENT
Start: 2022-08-31 | End: 2022-08-31

## 2022-08-31 RX ADMIN — ONDANSETRON 4 MG: 2 INJECTION INTRAMUSCULAR; INTRAVENOUS at 21:42

## 2022-08-31 RX ADMIN — PROMETHAZINE HYDROCHLORIDE 25 MG: 25 INJECTION INTRAMUSCULAR; INTRAVENOUS at 23:19

## 2022-08-31 RX ADMIN — IOPAMIDOL 75 ML: 755 INJECTION, SOLUTION INTRAVENOUS at 23:56

## 2022-08-31 RX ADMIN — SODIUM CHLORIDE 1000 ML: 9 INJECTION, SOLUTION INTRAVENOUS at 22:00

## 2022-08-31 RX ADMIN — MORPHINE SULFATE 4 MG: 4 INJECTION, SOLUTION INTRAMUSCULAR; INTRAVENOUS at 21:53

## 2022-08-31 ASSESSMENT — PAIN DESCRIPTION - FREQUENCY: FREQUENCY: CONTINUOUS

## 2022-08-31 ASSESSMENT — PAIN DESCRIPTION - DESCRIPTORS
DESCRIPTORS: ACHING
DESCRIPTORS: ACHING

## 2022-08-31 ASSESSMENT — PAIN DESCRIPTION - PAIN TYPE: TYPE: ACUTE PAIN

## 2022-08-31 ASSESSMENT — PAIN - FUNCTIONAL ASSESSMENT
PAIN_FUNCTIONAL_ASSESSMENT: PREVENTS OR INTERFERES SOME ACTIVE ACTIVITIES AND ADLS
PAIN_FUNCTIONAL_ASSESSMENT: PREVENTS OR INTERFERES SOME ACTIVE ACTIVITIES AND ADLS

## 2022-08-31 ASSESSMENT — PAIN DESCRIPTION - LOCATION
LOCATION: ABDOMEN
LOCATION: ABDOMEN

## 2022-08-31 ASSESSMENT — PAIN DESCRIPTION - ONSET: ONSET: ON-GOING

## 2022-08-31 ASSESSMENT — PAIN SCALES - GENERAL
PAINLEVEL_OUTOF10: 8
PAINLEVEL_OUTOF10: 10

## 2022-08-31 ASSESSMENT — PAIN DESCRIPTION - ORIENTATION: ORIENTATION: RIGHT;LEFT;LOWER;MID;UPPER

## 2022-08-31 NOTE — TELEPHONE ENCOUNTER
[facility] Transitions Initial Follow Up Call    Outreach made within 2 business days of discharge: Yes    Patient: Shea Salgado Patient : 1980   MRN: [id number]  Reason for Admission: There are no discharge diagnoses documented for the most recent discharge. Discharge Date: 22       Spoke with: pt    Discharge department/facility: [facility]    TCM Interactive Patient Contact:  Was patient able to fill all prescriptions: Yes  Was patient instructed to bring all medications to the follow-up visit: Yes  Is patient taking all medications as directed in the discharge summary? Pt states he can't take the meds because he can't open the packaging.  Advised pt to stop into office so we can help  Does patient understand their discharge instructions: Yes  Does patient have questions or concerns that need addressed prior to 7-14 day follow up office visit: yes - difficulty with meds,     Scheduled appointment with PCP within 7-14 days    Follow Up  Future Appointments   Date Time Provider Rosario Nesbitt   2022  9:00 AM SCHEDULE, [address] [facility]   2022  9:15 AM Moshe Landau, APRN - CNP [facility]   2022 11:00 AM RAFAEL Hwang - CNP [facility]   9/15/2022 10:30 AM Eun Sandra PSYD [facility]   11/10/2022 10:00 AM Gavino [facility]   2022  1:00 PM Estuardo Jaime MD AFLADVNPHHTN AFL ADV Anderson Knapp LPN

## 2022-09-01 ENCOUNTER — CARE COORDINATION (OUTPATIENT)
Dept: CARE COORDINATION | Age: 42
End: 2022-09-01

## 2022-09-01 ENCOUNTER — TELEPHONE (OUTPATIENT)
Dept: FAMILY MEDICINE CLINIC | Age: 42
End: 2022-09-01

## 2022-09-01 ENCOUNTER — TELEPHONE (OUTPATIENT)
Dept: INTERNAL MEDICINE CLINIC | Age: 42
End: 2022-09-01

## 2022-09-01 PROBLEM — R11.2 INTRACTABLE NAUSEA AND VOMITING: Status: ACTIVE | Noted: 2022-09-01

## 2022-09-01 LAB
ANION GAP SERPL CALCULATED.3IONS-SCNC: 6 MMOL/L (ref 4–16)
BASOPHILS ABSOLUTE: 0 K/CU MM
BASOPHILS RELATIVE PERCENT: 0.1 % (ref 0–1)
BETA-HYDROXYBUTYRATE: 3.9 MG/DL (ref 0–3)
BUN BLDV-MCNC: 10 MG/DL (ref 6–23)
CALCIUM SERPL-MCNC: 7.2 MG/DL (ref 8.3–10.6)
CHLORIDE BLD-SCNC: 100 MMOL/L (ref 99–110)
CO2: 25 MMOL/L (ref 21–32)
CREAT SERPL-MCNC: 0.9 MG/DL (ref 0.9–1.3)
DIFFERENTIAL TYPE: ABNORMAL
EOSINOPHILS ABSOLUTE: 0 K/CU MM
EOSINOPHILS RELATIVE PERCENT: 0.1 % (ref 0–3)
GFR AFRICAN AMERICAN: >60 ML/MIN/1.73M2
GFR NON-AFRICAN AMERICAN: >60 ML/MIN/1.73M2
GLUCOSE BLD-MCNC: 110 MG/DL (ref 70–99)
GLUCOSE BLD-MCNC: 155 MG/DL (ref 70–99)
GLUCOSE BLD-MCNC: 160 MG/DL (ref 70–99)
GLUCOSE BLD-MCNC: 168 MG/DL (ref 70–99)
GLUCOSE BLD-MCNC: 186 MG/DL (ref 70–99)
GLUCOSE BLD-MCNC: 200 MG/DL (ref 70–99)
HCT VFR BLD CALC: 33.8 % (ref 42–52)
HEMOGLOBIN: 10.8 GM/DL (ref 13.5–18)
IMMATURE NEUTROPHIL %: 0.6 % (ref 0–0.43)
LACTATE: 1.7 MMOL/L (ref 0.4–2)
LYMPHOCYTES ABSOLUTE: 1 K/CU MM
LYMPHOCYTES RELATIVE PERCENT: 7.6 % (ref 24–44)
MCH RBC QN AUTO: 29 PG (ref 27–31)
MCHC RBC AUTO-ENTMCNC: 32 % (ref 32–36)
MCV RBC AUTO: 90.9 FL (ref 78–100)
MONOCYTES ABSOLUTE: 1.3 K/CU MM
MONOCYTES RELATIVE PERCENT: 9.6 % (ref 0–4)
NUCLEATED RBC %: 0 %
PDW BLD-RTO: 15.2 % (ref 11.7–14.9)
PLATELET # BLD: 314 K/CU MM (ref 140–440)
PMV BLD AUTO: 8.4 FL (ref 7.5–11.1)
POTASSIUM SERPL-SCNC: 3.9 MMOL/L (ref 3.5–5.1)
POTASSIUM SERPL-SCNC: 4 MMOL/L (ref 3.5–5.1)
PROCALCITONIN: 1.46
RBC # BLD: 3.72 M/CU MM (ref 4.6–6.2)
SEGMENTED NEUTROPHILS ABSOLUTE COUNT: 10.8 K/CU MM
SEGMENTED NEUTROPHILS RELATIVE PERCENT: 82 % (ref 36–66)
SODIUM BLD-SCNC: 131 MMOL/L (ref 135–145)
TOTAL IMMATURE NEUTOROPHIL: 0.08 K/CU MM
TOTAL NUCLEATED RBC: 0 K/CU MM
WBC # BLD: 13.2 K/CU MM (ref 4–10.5)

## 2022-09-01 PROCEDURE — 2580000003 HC RX 258: Performed by: INTERNAL MEDICINE

## 2022-09-01 PROCEDURE — 2500000003 HC RX 250 WO HCPCS: Performed by: SURGERY

## 2022-09-01 PROCEDURE — 99221 1ST HOSP IP/OBS SF/LOW 40: CPT | Performed by: SURGERY

## 2022-09-01 PROCEDURE — 1200000000 HC SEMI PRIVATE

## 2022-09-01 PROCEDURE — 6370000000 HC RX 637 (ALT 250 FOR IP): Performed by: INTERNAL MEDICINE

## 2022-09-01 PROCEDURE — 94761 N-INVAS EAR/PLS OXIMETRY MLT: CPT

## 2022-09-01 PROCEDURE — 6360000002 HC RX W HCPCS: Performed by: INTERNAL MEDICINE

## 2022-09-01 PROCEDURE — C9113 INJ PANTOPRAZOLE SODIUM, VIA: HCPCS | Performed by: INTERNAL MEDICINE

## 2022-09-01 PROCEDURE — 2580000003 HC RX 258: Performed by: SURGERY

## 2022-09-01 PROCEDURE — 84145 PROCALCITONIN (PCT): CPT

## 2022-09-01 PROCEDURE — 87040 BLOOD CULTURE FOR BACTERIA: CPT

## 2022-09-01 PROCEDURE — 83605 ASSAY OF LACTIC ACID: CPT

## 2022-09-01 PROCEDURE — 82962 GLUCOSE BLOOD TEST: CPT

## 2022-09-01 PROCEDURE — A4216 STERILE WATER/SALINE, 10 ML: HCPCS | Performed by: INTERNAL MEDICINE

## 2022-09-01 PROCEDURE — 36415 COLL VENOUS BLD VENIPUNCTURE: CPT

## 2022-09-01 PROCEDURE — 6360000002 HC RX W HCPCS: Performed by: SURGERY

## 2022-09-01 PROCEDURE — 80048 BASIC METABOLIC PNL TOTAL CA: CPT

## 2022-09-01 PROCEDURE — 85025 COMPLETE CBC W/AUTO DIFF WBC: CPT

## 2022-09-01 RX ORDER — SODIUM CHLORIDE 0.9 % (FLUSH) 0.9 %
5-40 SYRINGE (ML) INJECTION EVERY 12 HOURS SCHEDULED
Status: DISCONTINUED | OUTPATIENT
Start: 2022-09-01 | End: 2022-09-02 | Stop reason: HOSPADM

## 2022-09-01 RX ORDER — CIPROFLOXACIN 2 MG/ML
400 INJECTION, SOLUTION INTRAVENOUS EVERY 12 HOURS
Status: DISCONTINUED | OUTPATIENT
Start: 2022-09-01 | End: 2022-09-02 | Stop reason: HOSPADM

## 2022-09-01 RX ORDER — SODIUM CHLORIDE 0.9 % (FLUSH) 0.9 %
5-40 SYRINGE (ML) INJECTION PRN
Status: DISCONTINUED | OUTPATIENT
Start: 2022-09-01 | End: 2022-09-02 | Stop reason: HOSPADM

## 2022-09-01 RX ORDER — SODIUM CHLORIDE 9 MG/ML
INJECTION, SOLUTION INTRAVENOUS PRN
Status: DISCONTINUED | OUTPATIENT
Start: 2022-09-01 | End: 2022-09-02 | Stop reason: HOSPADM

## 2022-09-01 RX ORDER — METRONIDAZOLE 500 MG/100ML
500 INJECTION, SOLUTION INTRAVENOUS EVERY 8 HOURS
Status: DISCONTINUED | OUTPATIENT
Start: 2022-09-01 | End: 2022-09-02 | Stop reason: HOSPADM

## 2022-09-01 RX ORDER — ONDANSETRON 4 MG/1
4 TABLET, ORALLY DISINTEGRATING ORAL EVERY 8 HOURS PRN
Status: DISCONTINUED | OUTPATIENT
Start: 2022-09-01 | End: 2022-09-02 | Stop reason: HOSPADM

## 2022-09-01 RX ORDER — METOPROLOL SUCCINATE 25 MG/1
25 TABLET, EXTENDED RELEASE ORAL DAILY
Status: DISCONTINUED | OUTPATIENT
Start: 2022-09-01 | End: 2022-09-02 | Stop reason: HOSPADM

## 2022-09-01 RX ORDER — INSULIN GLARGINE 100 [IU]/ML
30 INJECTION, SOLUTION SUBCUTANEOUS NIGHTLY
Status: DISCONTINUED | OUTPATIENT
Start: 2022-09-01 | End: 2022-09-02 | Stop reason: HOSPADM

## 2022-09-01 RX ORDER — ONDANSETRON 2 MG/ML
4 INJECTION INTRAMUSCULAR; INTRAVENOUS EVERY 6 HOURS PRN
Status: DISCONTINUED | OUTPATIENT
Start: 2022-09-01 | End: 2022-09-02 | Stop reason: HOSPADM

## 2022-09-01 RX ORDER — INSULIN LISPRO 100 [IU]/ML
0-8 INJECTION, SOLUTION INTRAVENOUS; SUBCUTANEOUS
Status: DISCONTINUED | OUTPATIENT
Start: 2022-09-01 | End: 2022-09-02 | Stop reason: HOSPADM

## 2022-09-01 RX ORDER — LOSARTAN POTASSIUM 25 MG/1
25 TABLET ORAL DAILY
Status: DISCONTINUED | OUTPATIENT
Start: 2022-09-01 | End: 2022-09-02 | Stop reason: HOSPADM

## 2022-09-01 RX ORDER — POLYETHYLENE GLYCOL 3350 17 G/17G
17 POWDER, FOR SOLUTION ORAL DAILY PRN
Status: DISCONTINUED | OUTPATIENT
Start: 2022-09-01 | End: 2022-09-02 | Stop reason: HOSPADM

## 2022-09-01 RX ORDER — INSULIN LISPRO 100 [IU]/ML
0-4 INJECTION, SOLUTION INTRAVENOUS; SUBCUTANEOUS NIGHTLY
Status: DISCONTINUED | OUTPATIENT
Start: 2022-09-01 | End: 2022-09-02 | Stop reason: HOSPADM

## 2022-09-01 RX ORDER — METOCLOPRAMIDE HYDROCHLORIDE 5 MG/ML
10 INJECTION INTRAMUSCULAR; INTRAVENOUS EVERY 6 HOURS
Status: COMPLETED | OUTPATIENT
Start: 2022-09-01 | End: 2022-09-02

## 2022-09-01 RX ORDER — PANTOPRAZOLE SODIUM 40 MG/10ML
40 INJECTION, POWDER, LYOPHILIZED, FOR SOLUTION INTRAVENOUS DAILY
Status: DISCONTINUED | OUTPATIENT
Start: 2022-09-01 | End: 2022-09-01

## 2022-09-01 RX ORDER — SODIUM CHLORIDE 9 MG/ML
INJECTION, SOLUTION INTRAVENOUS CONTINUOUS
Status: ACTIVE | OUTPATIENT
Start: 2022-09-01 | End: 2022-09-01

## 2022-09-01 RX ORDER — FLUOXETINE HYDROCHLORIDE 20 MG/1
80 CAPSULE ORAL DAILY
Status: DISCONTINUED | OUTPATIENT
Start: 2022-09-01 | End: 2022-09-02 | Stop reason: HOSPADM

## 2022-09-01 RX ORDER — ENOXAPARIN SODIUM 100 MG/ML
40 INJECTION SUBCUTANEOUS DAILY
Status: DISCONTINUED | OUTPATIENT
Start: 2022-09-01 | End: 2022-09-02 | Stop reason: HOSPADM

## 2022-09-01 RX ORDER — ACETAMINOPHEN 325 MG/1
650 TABLET ORAL EVERY 6 HOURS PRN
Status: DISCONTINUED | OUTPATIENT
Start: 2022-09-01 | End: 2022-09-02 | Stop reason: HOSPADM

## 2022-09-01 RX ORDER — DEXTROSE MONOHYDRATE 100 MG/ML
INJECTION, SOLUTION INTRAVENOUS CONTINUOUS PRN
Status: DISCONTINUED | OUTPATIENT
Start: 2022-09-01 | End: 2022-09-02 | Stop reason: HOSPADM

## 2022-09-01 RX ADMIN — SODIUM CHLORIDE, PRESERVATIVE FREE 40 MG: 5 INJECTION INTRAVENOUS at 08:46

## 2022-09-01 RX ADMIN — CIPROFLOXACIN 400 MG: 2 INJECTION, SOLUTION INTRAVENOUS at 08:43

## 2022-09-01 RX ADMIN — METOCLOPRAMIDE HYDROCHLORIDE 10 MG: 5 INJECTION INTRAMUSCULAR; INTRAVENOUS at 21:13

## 2022-09-01 RX ADMIN — METRONIDAZOLE 500 MG: 500 INJECTION, SOLUTION INTRAVENOUS at 10:09

## 2022-09-01 RX ADMIN — METOCLOPRAMIDE HYDROCHLORIDE 10 MG: 5 INJECTION INTRAMUSCULAR; INTRAVENOUS at 04:10

## 2022-09-01 RX ADMIN — METOCLOPRAMIDE HYDROCHLORIDE 10 MG: 5 INJECTION INTRAMUSCULAR; INTRAVENOUS at 10:38

## 2022-09-01 RX ADMIN — CIPROFLOXACIN 400 MG: 2 INJECTION, SOLUTION INTRAVENOUS at 21:20

## 2022-09-01 RX ADMIN — METRONIDAZOLE 500 MG: 500 INJECTION, SOLUTION INTRAVENOUS at 17:08

## 2022-09-01 RX ADMIN — METOCLOPRAMIDE HYDROCHLORIDE 10 MG: 5 INJECTION INTRAMUSCULAR; INTRAVENOUS at 16:00

## 2022-09-01 RX ADMIN — ENOXAPARIN SODIUM 40 MG: 100 INJECTION SUBCUTANEOUS at 08:46

## 2022-09-01 RX ADMIN — ACETAMINOPHEN 650 MG: 325 TABLET ORAL at 17:58

## 2022-09-01 RX ADMIN — INSULIN GLARGINE 30 UNITS: 100 INJECTION, SOLUTION SUBCUTANEOUS at 21:21

## 2022-09-01 RX ADMIN — SODIUM CHLORIDE: 9 INJECTION, SOLUTION INTRAVENOUS at 04:08

## 2022-09-01 RX ADMIN — SODIUM CHLORIDE: 9 INJECTION, SOLUTION INTRAVENOUS at 14:13

## 2022-09-01 ASSESSMENT — PAIN DESCRIPTION - DESCRIPTORS
DESCRIPTORS: SHARP
DESCRIPTORS: ACHING

## 2022-09-01 ASSESSMENT — PAIN SCALES - GENERAL
PAINLEVEL_OUTOF10: 3

## 2022-09-01 ASSESSMENT — ENCOUNTER SYMPTOMS
STRIDOR: 0
CONSTIPATION: 0
SHORTNESS OF BREATH: 0
COUGH: 0
VOMITING: 1
COLOR CHANGE: 0
DIARRHEA: 0
SORE THROAT: 0
CHOKING: 0
BLOOD IN STOOL: 0
NAUSEA: 1
ABDOMINAL DISTENTION: 0
BACK PAIN: 0
TROUBLE SWALLOWING: 0
ABDOMINAL PAIN: 1

## 2022-09-01 ASSESSMENT — PAIN DESCRIPTION - ORIENTATION
ORIENTATION: LEFT;RIGHT
ORIENTATION: RIGHT;LEFT;LOWER;UPPER

## 2022-09-01 ASSESSMENT — PAIN DESCRIPTION - ONSET: ONSET: ON-GOING

## 2022-09-01 ASSESSMENT — PAIN DESCRIPTION - LOCATION
LOCATION: LEG
LOCATION: ABDOMEN

## 2022-09-01 ASSESSMENT — PAIN DESCRIPTION - FREQUENCY: FREQUENCY: CONTINUOUS

## 2022-09-01 ASSESSMENT — PAIN - FUNCTIONAL ASSESSMENT: PAIN_FUNCTIONAL_ASSESSMENT: PREVENTS OR INTERFERES SOME ACTIVE ACTIVITIES AND ADLS

## 2022-09-01 ASSESSMENT — PAIN DESCRIPTION - PAIN TYPE: TYPE: ACUTE PAIN

## 2022-09-01 NOTE — ED PROVIDER NOTES
EMERGENCY DEPARTMENT ENCOUNTER      CHIEF COMPLAINT:   Abdominal pain  Nausea and vomiting    HPI: Clarisse Monroy is a 39 y.o. male who presents to the Emergency Department complaining of abdominal pain associated with nausea and vomiting. The patient was recently admitted to the hospital with similar symptoms with dehydration and electrolyte derangement. He states that he was feeling better when he was discharged home, once he got home the symptoms started again. He has been taking Reglan and Zofran as directed but is unable to keep anything down. He has vomited multiple times. He denies hematemesis. His abdominal pain is located in the lower abdomen. It is achy and cramping in nature. . There are no exacerbating or relieving factors. He denies any associated diarrhea or constipation. The patient denies fevers, chills, hematemesis, bloody stools, flank pain, or any other complaints. REVIEW OF SYSTEMS:  CONSTITUTIONAL:  Denies fever, chills, weight loss or weakness  EYES:  Denies photophobia or discharge  ENT:  Denies sore throat or ear pain  CARDIOVASCULAR:  Denies chest pain, palpitations or swelling  RESPIRATORY:  Denies cough or shortness of breath  GI: See HPI  MUSCULOSKELETAL:  Denies back pain  SKIN:  No rash  NEUROLOGIC:  Denies headache, focal weakness or sensory changes  All systems negative except as marked. \"Remaining review of systems reviewed and negative. I have reviewed the nursing triage documentation and agree unless otherwise noted below. \"    PAST MEDICAL HISTORY:   Past Medical History:   Diagnosis Date    Abscess of left foot 4/22/2022    Anemia associated with acute blood loss 4/26/2022    Callus of foot     Right foot - see's Dr. Sharry Leventhal    Cellulitis of left foot 4/22/2022    Diabetic ulcer of left midfoot associated with type 2 diabetes mellitus, with muscle involvement without evidence of necrosis ([facility] [address] 4/26/2022    Diabetic ulcer of left midfoot associated with type 2 diabetes mellitus, with necrosis of muscle ([facility] [address] 6/7/2021    Diabetic ulcer of right midfoot associated with type 2 diabetes mellitus, with fat layer exposed ([address] 8/11/2020    Dizziness     positional    Essential hypertension     Follows with PCP    Hyperlipidemia     Lumbar radiculopathy     Septic embolism ([address] 6/13/2020    Subacute osteomyelitis of left foot ([facility] [address] 4/22/2022       CURRENT MEDICATIONS:   Home medications reviewed.     SURGICAL HISTORY:   Past Surgical History:   Procedure Laterality Date    ACHILLES TENDON SURGERY Right 1/8/2021    RIGHT ACHILLES TENDON LENGTHENING REPAIR performed by Heladio Rubalcava DPM at [facility]  03/2018    [place]    DENTAL SURGERY      teeth extractions -half per patient    HERNIA REPAIR Bilateral 5/27/2020    BIALTERAL HERNIA INGUINAL REPAIR performed by Dano Erwin MD at [facility] 30 2018    [place] OFFICE/OUTPT VISIT,PROCEDURE ONLY Left 4/17/2018    L5-S1 HEMILAMINECTOMY, REMOVAL OF DISC LEFT SIDE performed by Minnie Sanders MD at [address] [place] [address] Right 1/8/2021    RIGHT TRANSMETATARSAL TOE AMPUTATION performed by Heladio Rubalcava DPM at [address] [place] [address] Left 4/23/2022    LEFT RAY GREAT TOE AMPUTATION performed by Talisha Krueger MD at [address] [place] [address],[address] EXTRACTION         FAMILY HISTORY:   Family History   Problem Relation Age of Onset    Heart Disease Father     Diabetes Father     Diabetes Mother     Heart Disease Mother     Other Mother     Kidney Disease Mother     Heart Attack Mother        SOCIAL HISTORY:   Social History     Socioeconomic History    Marital status: Single     Spouse name: Not on file    Number of children: Not on file    Years of education: Not on file    Highest education level: Not on file   Occupational History    Not on file   Tobacco Use    Smoking status: Never    Smokeless tobacco: Never   Vaping Use    Vaping Use: Never used   Substance and Sexual Activity    Alcohol use: Yes     Comment: \"couple beers a night\"    Drug use: No    Sexual activity: Yes     Partners: Female   Other Topics Concern    Not on file   Social History Narrative    Not on file     Social Determinants of Health     Financial Resource Strain: Medium Risk    Difficulty of Paying Living Expenses: Somewhat hard   Food Insecurity: Food Insecurity Present    Worried About [address] in the Last Year: Sometimes true    Ran Out of Food in the Last Year: Sometimes true   Transportation Needs: No Transportation Needs    Lack of Transportation (Medical): No    Lack of Transportation (Non-Medical): No   Physical Activity: Inactive    Days of Exercise per Week: 0 days    Minutes of Exercise per Session: 0 min   Stress: Stress Concern Present    Feeling of Stress : Rather much   Social Connections: Unknown    Frequency of Communication with Friends and Family: Once a week    Frequency of Social Gatherings with Friends and Family: Not on file    Attends Religion Services: Never    Active Member of Clubs or Organizations: Not on file    Attends Club or Organization Meetings: Never    Marital Status: Never    Intimate Partner Violence: Not on file   Housing Stability: [address] to Pay for Housing in the Last Year: No    Number of Places Lived in the Last Year: 1    Unstable Housing in the Last Year: No       ALLERGIES: Patient has no known allergies. PHYSICAL EXAM:  VITAL SIGNS:   ED Triage Vitals   Enc Vitals Group      BP 08/31/22 2049 (!) 176/83      Heart Rate 08/31/22 2049 (!) 109      Resp 08/31/22 2049 16      Temp 08/31/22 2049 99.7 °F (37.6 °C)      Temp Source 08/31/22 2049 Oral      SpO2 08/31/22 2049 97 %      Weight 08/31/22 2204 180 lb (81.6 kg)      Height 08/31/22 2204 6' (1.829 m)      Head Circumference --       Peak Flow --       Pain Score --       Pain Loc --       Pain Edu? --       Excl.  in [address]? --      Constitutional:  Non-toxic appearance  HENT: Normocephalic, Atraumatic, Bilateral external ears normal, Oropharynx moist, No oral exudates, Nose normal.  Eyes: PERRL, conjunctiva normal   Neck: Normal range of motion, No tenderness, Supple, No stridor,No lymphadenopathy   Cardiovascular:  Normal heart rate, Normal rhythm  Pulmonary/Chest:  Normal breath sounds, No respiratory distress, No wheezing  Abdomen: Bowel sounds normal, Soft, lower abdominal tenderness to palpation with no guarding or rebound, No masses, No pulsatile masses  Extremities:  Normal range of motion, Intact distal pulses, No edema, No tenderness  Skin:  Warm, Dry, No erythema, No rash      EKG:    None    Radiology / Procedures:  CT ABDOMEN PELVIS W IV CONTRAST Additional Contrast? None (Final result)  Result time 09/01/22 00:45:36  Final result by Gemini Yang MD (09/01/22 00:45:36)                Impression:    There is fluid in the lumen at the cecum and ascending colon. There is   pneumatosis along the wall of the cecum to ascending colon without wall   thickening or surrounding pericolonic inflammation. No appendicitis. The   remaining colon is collapsed. The pneumatosis and fluid are nonspecific but   may relate to a colitis and diarrhea. There is no portal venous gas or   pneumoperitoneum. Mildly increased lymph nodes in the periaortic and pericaval chain compared   with 06/12/2021. These may be post infectious/inflammatory. Narrative:    EXAMINATION:   CT OF THE ABDOMEN AND PELVIS WITH CONTRAST 8/31/2022 11:56 pm     TECHNIQUE:   CT of the abdomen and pelvis was performed with the administration of   intravenous contrast. Multiplanar reformatted images are provided for review. Automated exposure control, iterative reconstruction, and/or weight based   adjustment of the mA/kV was utilized to reduce the radiation dose to as low   as reasonably achievable.      COMPARISON:   08/28/2022 and 06/12/2021     HISTORY:   ORDERING SYSTEM PROVIDED HISTORY: Abdominal pain   TECHNOLOGIST PROVIDED HISTORY:   Reason for exam:->Abdominal pain   Additional Contrast?->None   Decision Support Exception - unselect if not a suspected or confirmed   emergency medical condition->Emergency Medical Condition (MA)   Reason for Exam: Abdominal Pain; Emesis; Nausea     FINDINGS:   Lower Chest: Has dependent atelectasis in the lung bases but no consolidation   or pleural effusion. Heart is not enlarged. Organs: No mass or nodularity is seen within the liver. No mineralized   stones in the gallbladder and the biliary system is not dilated. The spleen   is not enlarged. No pancreatic calcification, ductal dilatation, or   surrounding fluid collection. The adrenal glands are unremarkable. Kidneys are enhancing equally without acute cortical defect. Mild   perinephric stranding is present but relatively symmetric. May have a few   punctate calculi in the right kidney but no hydronephrosis or hydroureter. No calculi in the distal ureter. Left kidney has several small to moderate   calculi with the larger measuring up to 5 mm. There is no left-sided   hydronephrosis or ureteral calculus. GI/Bowel: The stomach, small bowel, and colon are not dilated. Stomach is   under distended and gastritis is not excluded. The appendix is identified   and no appendicitis. There is some pneumatosis at the cecum and ascending   colon with fluid in the lumen. .  The colonic wall is not thickened and there   is no focal surrounding inflammation. Transverse and distal colon are   collapsed without wall thickening or pneumatosis. There is no significant   rectal fecal bolus. There is no portal venous gas or pneumoperitoneum. Pelvis: Urinary bladder wall is not thickened. Prostate is not enlarged. No   free fluid in the pelvis. Peritoneum/Retroperitoneum: No abdominal aortic aneurysm or retroperitoneal   hematoma.   Several borderline lymph nodes are present along the periaortic   and pericaval chain are mildly increased from the old exam.. Bones/Soft Tissues: No acute fracture or destruction at the included bones. Old deformity of upper endplate of L1. No fluid collection in the abdominal   wall. Labs Reviewed   CBC WITH AUTO DIFFERENTIAL - Abnormal; Notable for the following components:       Result Value    WBC 12.9 (*)     RBC 4.42 (*)     Hemoglobin 13.0 (*)     Hematocrit 39.4 (*)     RDW 15.1 (*)     Segs Relative 87.0 (*)     Lymphocytes % 5.1 (*)     Monocytes % 7.1 (*)     Immature Neutrophil % 0.5 (*)     All other components within normal limits   COMPREHENSIVE METABOLIC PANEL - Abnormal; Notable for the following components:    Sodium 127 (*)     Potassium 6.2 (*)     Chloride 92 (*)     Glucose 233 (*)     Calcium 8.1 (*)     Albumin 2.8 (*)     Total Protein 9.0 (*)     Total Bilirubin 1.1 (*)     AST 77 (*)     All other components within normal limits   LACTIC ACID - Abnormal; Notable for the following components:    Lactate 2.2 (*)     All other components within normal limits   BETA-HYDROXYBUTYRATE - Abnormal; Notable for the following components:    Beta-Hydroxybutyrate 3.9 (*)     All other components within normal limits   BLOOD GAS, VENOUS - Abnormal; Notable for the following components:    pO2, Eric 50 (*)     O2 Sat, Eric 81.6 (*)     All other components within normal limits       ED COURSE & MEDICAL DECISION MAKING:  Pertinent Labs & Imaging studies reviewed. (See chart for details)  On exam, the patient is afebrile and nontoxic appearing. He is tachycardic but this resolves with IV fluids. He is hypertensive with a known history of hypertension and is asymptomatic. He is otherwise hemodynamically stable and neurologically intact. Labs are obtained and are significant for leukocytosis with a left shift, and elevated lactic acid level, and elevated potassium, and elevated AST, hyponatremia and an elevated glucose of 233.   Beta hydroxybutyrate is 3.5.  Venous blood gas is unremarkable. There is no evidence of acidosis. The patient's hyperkalemia did not really make sense as he had normal renal function and a normal potassium a few days ago. This was redrawn and sent to the lab and repeat potassium was normal at 3.9. Lactic acid is normal.  CT abdomen and pelvis was obtained and there is fluid in the lumen of the cecum and ascending colon. There is pneumatosis along the wall of the cecum to the ascending colon without wall thickening or surrounding pericolonic inflammation. There is no appendicitis. The remaining colon is collapsed. The pneumatosis and fluid are nonspecific but may relate to a colitis and diarrhea. There is no portal venous gas or pneumoperitoneum. There are mildly increased lymph nodes in the periaortic and pericaval he will chain compared with 6/12/2021. These may be postinfectious or inflammatory. The patient was treated with IV normal saline, Zofran and morphine. He was later given Phenergan for persistent nausea. I discussed his case with the general surgeon on-call, Dr. Chelsea Lazaro, regarding the intestinal pneumatosis, and he states that if the physical exam is reassuring that this is usually not clinically alarming. He will see the patient in consult. I suspect that the patient has dehydration secondary to intractable nausea and vomiting. He also likely has colitis. I have a low suspicion for acute appendicitis, intraabdominal abscess, perforation, bowel obstruction, AAA, dissection, ischemic bowel, or acute surgical abdomen. I recommended admission to the hospital and the patient was agreeable. I discussed the case with the hospitalist who will admit the patient for further treatment and care. The patient is currently in stable condition awaiting admission. Clinical Impression:  1. Intractable vomiting with nausea, unspecified vomiting type    2. Lower abdominal pain    3.  Pneumatosis intestinalis Comment: Please note this report has been produced using speech recognition software and may contain errors related to that system including errors in grammar, punctuation, and spelling, as well as words and phrases that may be inappropriate. If there are any questions or concerns please feel free to contact the dictating provider for clarification.         Stacie Gregg MD  09/03/22 [id number]

## 2022-09-01 NOTE — TELEPHONE ENCOUNTER
Spoke with pt's brother who stated he has been trying to get him into assisted living facility [facility]. Somebody states they are working with his . Can not will not move pt into his home due to his multiple issues, has a son to raise. Pt doesn't have fridge, Julien's home full of pizza and soda pop. House paid off, unable to pay off car due to Nor[employer] B[employer]uin has no access to his mother's account. Brother is unable to get access to proper information.

## 2022-09-01 NOTE — TELEPHONE ENCOUNTER
Pt calling via [facility], regarding scheduling appt. Pt hung up before call could be transferred for scheduling. Attempted to call pt. Left  requesting return call. He does already have New Pt appt, with Dr Juana Baires on 9/15/22.

## 2022-09-01 NOTE — CARE COORDINATION
Chart reviewed. From home, independent PTA. Has PCP and insurance. Pt follows with wound clinic. No needs identified at this time. CM available should any new needs arise.

## 2022-09-01 NOTE — PROGRESS NOTES
Declined to have wraps removed from bilat feet and legs stating that he sees wound care and they want him to keep them on. Will order wound consult.

## 2022-09-01 NOTE — ED TRIAGE NOTES
Patient brought in by squad with complaint of nausea and emesis. Patient states the emesis is causing abdominal pain.

## 2022-09-01 NOTE — CARE COORDINATION
Email sent to haylee Falk at [facility] for any possible assistance in obtaining fridge for pt. Phone call with  also left for [facility] cm.

## 2022-09-01 NOTE — CARE COORDINATION
This RN CM to bedside to speak with pt about his refrigerator situation at home. I did ask if he called the [address] number that was provided to him by [facility]. He states he has not had time to call yet but that he plans on it once he is discharged. This RN CM will also provide a list of resources for low income housing, food resources and the number for  [employer] again.

## 2022-09-01 NOTE — PROGRESS NOTES
Arrived on unit per cart per ED tech. A/O 4. States feeling better since nausea better. Oriented to staff, room and call light. Telemetry applied.

## 2022-09-01 NOTE — CONSULTS
[facility] of [facility]   Consult Note    Date of Consult: 9/1/22        Reason for Consult: Nausea vomiting/pneumatosis on CT  Requesting Physician: ED    CHIEF COMPLAINT: Nausea vomiting    History Obtained From:  patient, electronic medical record    HISTORY OF PRESENT ILLNESS:                The patient is a 39 y.o. male who presents with intractable nausea vomiting. Past medical history includes diabetes, diabetic foot ulcers status post amputations, HLD, anxiety. Patient presents with intractable nausea vomiting. He was here for same a few days ago. He complains of lower abdominal pain which is remained unchanged. Work-up included CT showing pneumatosis on the right colon. Lactic acid slightly elevated and has normalized after resuscitation. Patient currently without significant abdominal pain on exam today. History of bilateral inguinal hernia repair 2020. No other abdominal surgeries.       Past Medical History:    Past Medical History:   Diagnosis Date    Abscess of left foot 4/22/2022    Anemia associated with acute blood loss 4/26/2022    Callus of foot     Right foot - see's Dr. Harper Cadet    Cellulitis of left foot 4/22/2022    Diabetic ulcer of left midfoot associated with type 2 diabetes mellitus, with muscle involvement without evidence of necrosis ([address] 4/26/2022    Diabetic ulcer of left midfoot associated with type 2 diabetes mellitus, with necrosis of muscle ([address] 6/7/2021    Diabetic ulcer of right midfoot associated with type 2 diabetes mellitus, with fat layer exposed ([address] 8/11/2020    Dizziness     positional    Essential hypertension     Follows with PCP    Hyperlipidemia     Lumbar radiculopathy     Septic embolism ([address] 6/13/2020    Subacute osteomyelitis of left foot ([address] 4/22/2022       Past Surgical History:    Past Surgical History:   Procedure Laterality Date    ACHILLES TENDON SURGERY Right 1/8/2021    RIGHT ACHILLES TENDON LENGTHENING REPAIR performed by Pawel Singh DPM at [address] [facility]  03/2018    [facility]    DENTAL SURGERY      teeth extractions -half per patient    HERNIA REPAIR Bilateral 5/27/2020    BIALTERAL HERNIA INGUINAL REPAIR performed by Aisha Borges MD at [facility] 2018    MS OFFICE/OUTPT VISIT,PROCEDURE ONLY Left 4/17/2018    L5-S1 HEMILAMINECTOMY, REMOVAL OF DISC LEFT SIDE performed by Mellissa Aguero MD at [address] Right 1/8/2021    RIGHT TRANSMETATARSAL TOE AMPUTATION performed by Pawel Singh DPM at [address] Left 4/23/2022    LEFT RAY GREAT TOE AMPUTATION performed by Nell Smith MD at [facility]         Current Medications:   Current Facility-Administered Medications   Medication Dose Route Frequency Provider Last Rate Last Admin    sodium chloride flush 0.9 % injection 5-40 mL  5-40 mL IntraVENous 2 times per day Donny David MD        sodium chloride flush 0.9 % injection 5-40 mL  5-40 mL IntraVENous PRN Donny David MD        0.9 % sodium chloride infusion   IntraVENous PRN Donny David MD        enoxaparin (LOVENOX) injection 40 mg  40 mg SubCUTAneous Daily Donny David MD   40 mg at 09/01/22 0846    ondansetron (ZOFRAN-ODT) disintegrating tablet 4 mg  4 mg Oral Q8H PRN Donny David MD        Or    ondansetron (ZOFRAN) injection 4 mg  4 mg IntraVENous Q6H PRN Donny David MD        polyethylene glycol (GLYCOLAX) packet 17 g  17 g Oral Daily PRN Donny David MD        acetaminophen (TYLENOL) tablet 650 mg  650 mg Oral Q6H PRN Donny David MD        Or    acetaminophen (TYLENOL) suppository 650 mg  650 mg Rectal Q6H PRN Donny David MD        0.9 % sodium chloride infusion   IntraVENous Continuous Romy Shukla  mL/hr at 09/01/22 1413 New Bag at 09/01/22 1413    metoclopramide (REGLAN) injection 10 mg  10 mg IntraVENous Q6H Devan Barba MD   10 mg at 09/01/22 1038    FLUoxetine (PROZAC) capsule 80 mg  80 mg Oral Daily Devan Barba MD        insulin glargine (LANTUS) injection vial 30 Units  30 Units SubCUTAneous Nightly Devan Barba MD        losartan (COZAAR) tablet 25 mg  25 mg Oral Daily Devan Barba MD        metoprolol succinate (TOPROL XL) extended release tablet 25 mg  25 mg Oral Daily Ankur Segura MD        insulin lispro (HUMALOG) injection vial 0-8 Units  0-8 Units SubCUTAneous TID WC Ankur Segura MD        insulin lispro (HUMALOG) injection vial 0-4 Units  0-4 Units SubCUTAneous Nightly Ankur Segura MD        glucose chewable tablet 16 g  4 tablet Oral PRN Devan Barba MD        dextrose bolus 10% 125 mL  125 mL IntraVENous PRN Devan Barba MD        Or    dextrose bolus 10% 250 mL  250 mL IntraVENous PRN Devan Barba MD        glucagon (rDNA) injection 1 mg  1 mg SubCUTAneous PRN Devan Barba MD        dextrose 10 % infusion   IntraVENous Continuous PRN Devan Barba MD        pantoprazole (PROTONIX) 40 mg in sodium chloride (PF) 10 mL injection  40 mg IntraVENous Daily Devan Barba MD   40 mg at 09/01/22 0846    ciprofloxacin (CIPRO) IVPB 400 mg  400 mg IntraVENous Q12H Lorena Lemons MD   Stopped at 09/01/22 1008    metronidazole (FLAGYL) 500 mg in 0.9% NaCl 100 mL IVPB premix  500 mg IntraVENous Q8H Lorena Lemons MD   Stopped at 09/01/22 1109       Allergies:  Patient has no known allergies.     Social History:   Social History     Socioeconomic History    Marital status: Single   Tobacco Use    Smoking status: Never    Smokeless tobacco: Never   Vaping Use    Vaping Use: Never used   Substance and Sexual Activity    Alcohol use: Yes     Comment: \"couple beers a night\"    Drug use: No    Sexual activity: Yes     Partners: Female     Social Determinants of Health     Financial Resource Strain: Medium Risk Difficulty of Paying Living Expenses: Somewhat hard   Food Insecurity: Food Insecurity Present    Worried About [address] in the Last Year: Sometimes true    Ran Out of Food in the Last Year: Sometimes true   Transportation Needs: No Transportation Needs    Lack of Transportation (Medical): No    Lack of Transportation (Non-Medical): No   Physical Activity: Inactive    Days of Exercise per Week: 0 days    Minutes of Exercise per Session: 0 min   Stress: Stress Concern Present    Feeling of Stress : Rather much   Social Connections: Unknown    Frequency of Communication with Friends and Family: Once a week    Attends [employer] Services: Never    Attends Club or Organization Meetings: Never    Marital Status: Never    Housing Stability: Low Risk     Unable to Pay for Housing in the Last Year: No    Number of Places Lived in the Last Year: 1    Unstable Housing in the Last Year: No       Family History:   Family History   Problem Relation Age of Onset    Heart Disease Father     Diabetes Father     Diabetes Mother     Heart Disease Mother     Other Mother     Kidney Disease Mother     Heart Attack Mother        REVIEW OFSYSTEMS:    Review of Systems   Constitutional:  Negative for chills, fatigue, fever and unexpected weight change. HENT:  Negative for sore throat and trouble swallowing. Respiratory:  Negative for cough, choking, shortness of breath and stridor. Cardiovascular:  Negative for chest pain, palpitations and leg swelling. Gastrointestinal:  Positive for abdominal pain, nausea and vomiting. Negative for abdominal distention, blood in stool, constipation and diarrhea. Musculoskeletal:  Negative for back pain, gait problem and joint swelling. Skin:  Negative for color change, rash and wound. Allergic/Immunologic: Negative for immunocompromised state. Neurological:  Negative for dizziness, speech difficulty, weakness and light-headedness.    Hematological:  Negative for adenopathy. Does not bruise/bleed easily. Psychiatric/Behavioral:  Negative for agitation and confusion. The patient is not nervous/anxious.       PHYSICAL EXAM:  Vitals:    09/01/22 0258 09/01/22 0306 09/01/22 0400 09/01/22 0738   BP:    118/68   Pulse: 87 86 92 85   Resp:    18   Temp:    99.9 °F (37.7 °C)   TempSrc:    Oral   SpO2:    96%   Weight:       Height:           Physical Exam    General: No acute distress  Neck: No JVD, supple  Lungs: Chest rise equal bilaterally  Cardiac: Appears well perfused   Abdomen: Soft, nontender, nondistended, no rebound or guarding  Extremities, no edema, cyanosis, or clubbing  Skin: No rashes or breakdown  Neurologic: cranial nerves II - XII grossly intact    DATA:    CBC:   Lab Results   Component Value Date/Time    WBC 13.2 09/01/2022 06:00 AM    RBC 3.72 09/01/2022 06:00 AM    HGB 10.8 09/01/2022 06:00 AM    HCT 33.8 09/01/2022 06:00 AM    MCV 90.9 09/01/2022 06:00 AM    MCH 29.0 09/01/2022 06:00 AM    MCHC 32.0 09/01/2022 06:00 AM    RDW 15.2 09/01/2022 06:00 AM     09/01/2022 06:00 AM    MPV 8.4 09/01/2022 06:00 AM       IMPRESSION:        Patient Active Problem List:     Unstable angina (HCC)     Lumbar back pain with radiculopathy affecting left lower extremity     S/P lumbar laminectomy     Type 2 diabetes mellitus with diabetic neuropathy, without long-term current use of insulin (HCC)     Essential hypertension     Gastroesophageal reflux disease without esophagitis     Chest pain     Moderate nonproliferative diabetic retinopathy of both eyes without macular edema associated with type 2 diabetes mellitus (HCC)     Acute osteomyelitis (HCC)     Acute osteomyelitis of foot (HCC)     Abscess of left foot     Cellulitis of left foot     Subacute osteomyelitis of left foot (HCC)     Anemia associated with acute blood loss     Anxiety attack     WD-Diabetic ulcer of left midfoot associated with type 2 diabetes mellitus, with fat layer exposed ([address] WD-Diabetic ulcer of right midfoot associated with type 2 diabetes mellitus, with fat layer exposed ([address]     WD-Partial nontraumatic amputation of foot, left (HCC)     Severe episode of recurrent major depressive disorder, without psychotic features ([address]     Insomnia     Intractable vomiting with nausea     Intractable nausea and vomiting      Intractable nausea vomiting    PLAN:    CT reviewed. Pneumatosis cecum/right colon. Abdomen nonsurgical  Clear liquid diet and advance if tolerates  Will follow.         Makenna Webb, DO

## 2022-09-01 NOTE — PROGRESS NOTES
Comprehensive Nutrition Assessment    Type and Reason for Visit:  Initial, Positive Nutrition Screen (Poor appetite and Weight loss)    Nutrition Recommendations/Plan:   Continue current diet, advance as pt per tolerates   Liberalize to Carb control 5  Will provide diet education PRN   Once on full liquids, will begin low calorie, high protein oral nutrition supplements   Document PO intakes and GI status      Malnutrition Assessment:  Malnutrition Status:  No malnutrition (09/01/22 1635)    Context:  Acute Illness     Findings of the 6 clinical characteristics of malnutrition:  Energy Intake:  50% or less of estimated energy requirements for 5 or more days  Weight Loss:  No significant weight loss     Body Fat Loss:  No significant body fat loss     Muscle Mass Loss:  No significant muscle mass loss    Fluid Accumulation:  No significant fluid accumulation     Strength:  Not Performed    Nutrition Assessment:    Admitted with intractable N/V. Pt was NPO at visit, advanced to clears with carb controlled modifier. Pt reports Poor appetite/N/V with emese Q H over the last year. Feels better NPO. C/o generalized weakness, performed NFPE, no wasting observed. Pt denies weight loss, states UBW of 180 lb. Per chart review, some wt loss x 5 months, not clinically significant. Discussed with pt over diabetic gastroparesis, will provided additional education once diet advances. Encourage BS mgt and low fat intake. Once pt advances to fulls, may add diabetic supplements. Follow pt closely at this time. Nutrition Related Findings:    POCT 110 Wound Type: Wound Consult Pending (Toe amputations on both feet)       Current Nutrition Intake & Therapies:    Average Meal Intake: NPO  Average Supplements Intake: NPO  ADULT DIET;  Clear Liquid; 4 carb choices (60 gm/meal)    Anthropometric Measures:  Height: 6' (182.9 cm)  Ideal Body Weight (IBW): 178 lbs (81 kg)    Admission Body Weight: 184 lb 11.9 oz (83.8 kg)  Current Body Weight: 184 lb 11.9 oz (83.8 kg), 103.8 % IBW. Weight Source: Bed Scale  Current BMI (kg/m2): 25.1  Usual Body Weight: 190 lb (86.2 kg) (4/18/2022)  % Weight Change (Calculated): -2.8  Weight Adjustment For: Amputation (toes)  BMI Categories: Overweight (BMI 25.0-29. 9)    Estimated Daily Nutrient Needs:  Energy Requirements Based On: Formula  Weight Used for Energy Requirements: Current  Energy (kcal/day): 3672-1364 (Ximena Sharp)  Weight Used for Protein Requirements: Ideal  Protein (g/day):  (1.2-1.4 g/kg)  Method Used for Fluid Requirements: 1 ml/kcal  Fluid (ml/day): 7154-7894    Nutrition Diagnosis:   Predicted inadequate energy intake related to altered GI function, altered GI structure, endocrine dysfuntion as evidenced by nausea, vomiting, poor intake prior to admission    Nutrition Interventions:   Food and/or Nutrient Delivery: Continue Current Diet (Advance up to fulls prior to discharge)  Nutrition Education/Counseling: Education initiated  Coordination of Nutrition Care: Continue to monitor while inpatient, Coordination of Care    Goals:  Goals: Meet at least 75% of estimated needs       Nutrition Monitoring and Evaluation:   Behavioral-Environmental Outcomes: None Identified  Food/Nutrient Intake Outcomes: Diet Advancement/Tolerance, Food and Nutrient Intake  Physical Signs/Symptoms Outcomes: Biochemical Data, Weight, GI Status, Meal Time Behavior    Discharge Planning:     Too soon to determine     Jamie Rodrigez RD, LD  Contact: [phone]

## 2022-09-01 NOTE — H&P
History and Physical      Name:  Chapo Mendosa /Age/Sex: 1980  (39 y.o. male)   MRN & CSN:  [id number] & [id number] Encounter Date/Time: 2022 2:08 AM EDT   [facility] PCP: Fadumo Jefferson [address] Loc[address] within [facility] Day: 2    Assessment and Plan:     #. Intractable nausea, vomiting  -CT abdomen/pelvis-fluid in the lumen at the cecum and ascending colon, pneumatosis along the wall of cecum to ascending colon without wall thickening or surrounding pericolonic inflammation.  -Keep n.p.o.  -Continue IV fluids, IV Protonix.  -Patient denied using marijuana since discharge   -Diabetic gastroparesis in differential -ordered Reglan Q6 hours x6 doses. #.  Abdominal pain  -CT abdomen/pelvis with findings as above  -Lactic acid was 2.2 which normalized after IV fluids.  -Continue serial abdominal examination.  -General surgery consulted from ED. #.  Mild hyponatremia  -Continue IV fluids and repeat BMP    #. Uncontrolled diabetes mellitus type 2, on long-term insulin  -Patient is on glyburide, dulaglutide, glargine 30 units every morning, pioglitazone  -Continue glargine 30 units q. a.m., insulin sliding scale with hypoglycemia protocol. #.  Diabetic neuropathy    #. Diabetic foot ulcer with osteomyelitis s/p right transmetatarsal toe amputation-2021, ray amputation of the left great toe-2022    #. Hypertension  -Patient is on metoprolol succinate, losartan    #. History of chronic back pain with radiculopathy s/p left L5-S1 hemilaminectomy-2018    #. History of bilateral inguinal hernia repair with mesh-2020    #. Left peroneal vein DVT-2022    #. 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   Anxiety/depression-on fluoxetine    Disposition:   Current Living situation: home    Diet N.p.o.   DVT Prophylaxis [x] Lovenox, []  Heparin, [] SCDs, [] Ambulation,  [] Eliquis, [] Xarelto   Code Status Prior   Surrogate Decision Maker/ POA      History from:   EMR, patient. History of Present Illness:     Chief Complaint: Intractable nausea and vomiting  Andrés Montoya is a 39 y.o. male with hypertension, diabetes mellitus type 2, complicated by diabetic neuropathy, diabetic foot ulcer requiring amputation, hyperlipidemia, anxiety/depression presented to ED with complaints of nausea, vomiting, abdominal pain. Patient was admitted to the hospital 8/28-8/29/2022 with similar complaints. Patient reported feeling better at the time of discharge and was able to tolerate p.o. intake. Patient complained of bilateral lower quadrant abdominal pain, constant, 10/10 intensity, denied any aggravating or relieving factors. Patient denied any fever or chills. Patient denied any urinary complaints, denied any constipation or diarrhea. At presentation patient was noted to have /83, , RR 16, temp 99.7, saturating 97% on room air. Lab work was significant for sodium 127, potassium 6.2 (sample hemolyzed-repeat potassium 3.9), chloride 92, lactic acid 2.2. AST 77, total bilirubin 1.1, lipase 20, random glucose 233, WBC 12.9, hemoglobin 13. VBG-pH 7.39, PCO2 41, PO2 50, HCO3 24.8. CT abdomen/pelvis-pneumatosis along the wall of cecum to ascending colon without wall thickening or surrounding pericolonic inflammation. Given CT abdomen/pelvis findings general surgery was consulted. Patient received NS bolus, Phenergan, Zofran, morphine in ED. Review of Systems: Need 10 Elements   10 point review of systems conducted and pertinent positives and negatives as per HPI.     Objective:   No intake or output data in the 24 hours ending 09/01/22 0208   Vitals:   Vitals:    08/31/22 2332 09/01/22 0002 09/01/22 0032 09/01/22 0053   BP: 137/75 (!) 147/79 132/75    Pulse:       Resp: (!) 7      Temp:       TempSrc:       SpO2: 96% 96% 96% 96%   Weight:       Height:           Medications Prior to Admission   Reviewed medications with patient    Prior to Admission medications Medication Sig Start Date End Date Taking? Authorizing Provider   metoclopramide (REGLAN) 10 MG tablet Take 1 tablet by mouth 4 times daily (before meals and nightly) 8/29/22   Salli Landau, APRN - CNP   ondansetron (ZOFRAN-ODT) 4 MG disintegrating tablet Take 1 tablet by mouth every 8 hours as needed for Nausea or Vomiting 8/29/22   Salli Landau, APRN - CNP   sucralfate (CARAFATE) 1 GM/10ML suspension Take 10 mLs by mouth 4 times daily for 7 days 8/18/22 8/25/22  RAFAEL Leija CNP   glyBURIDE (DIABETA) 5 MG tablet Take 2 tablets by mouth in the morning and at bedtime 8/1/22   RAFAEL Leija CNP   Dulaglutide 1.5 MG/0.5ML SOPN Inject 1.5 mg into the skin once a week 8/1/22   RAFAEL Leija CNP   omeprazole (PRILOSEC) 20 MG delayed release capsule Take 1 capsule by mouth once daily in the morning 7/29/22   RAFAEL Leija CNP   metoprolol succinate (TOPROL XL) 25 MG extended release tablet Take 1 tablet by mouth in the morning. 7/29/22   RAFAEL Leija CNP   losartan (COZAAR) 25 MG tablet Take 1 tablet by mouth in the morning. 7/29/22 10/27/22  RAFAEL Leija CNP   metFORMIN (GLUCOPHAGE) 500 MG tablet Take 2 tablets by mouth in the morning and 2 tablets in the evening. Take with meals.  Indications: Start tomorrow 03/14. 7/29/22 8/29/22  RAFAEL Leija CNP   FLUoxetine (PROZAC) 40 MG capsule Take 2 capsules by mouth in the morning. 7/28/22 8/27/22  RAFAEL Leija CNP   insulin glargine (LANTUS SOLOSTAR) 100 UNIT/ML injection pen Inject 30 Units into the skin nightly 6/30/22 9/28/22  Sherwin Castillo MD   collagenase 250 UNIT/GM ointment Per instructions DAILY (route: topical) 5/23/22   Historical Provider, MD   Alcohol Swabs PADS  4/27/22   Historical Provider, MD   pioglitazone (ACTOS) 30 MG tablet Take 1 tablet by mouth daily 4/27/22 8/18/22  China Miller MD   atorvastatin (LIPITOR) 40 MG tablet Take 1 tablet by mouth nightly 4/27/22 8/18/22 Fatoumata Jimenez MD   blood glucose monitor strips Test 2 times a day & as needed for symptoms of irregular blood glucose. Dispense sufficient amount for indicated testing frequency plus additional to accommodate PRN testing needs. 4/27/22   Fatoumata Jimenez MD   blood glucose monitor kit and supplies Dispense sufficient amount for indicated testing frequency plus additional to accommodate PRN testing needs. Dispense all needed supplies to include: monitor, strips, lancing device, lancets, control solutions, alcohol swabs. 6/15/20   Vandana Mckenzie MD   FreeStyle Lancets MISC 1 each by Does not apply route daily 6/15/20   Vandana Mckenzie MD   acetaminophen (TYLENOL) 325 MG tablet Take 2 tablets by mouth every 4 hours as needed for Pain or Fever 2/28/18   Delvis Mann MD       Physical Exam: Need 8 Elements   Physical Exam     GEN  -Awake, alert, NAD.   EYES   -PERRL. HENT  -MM are dry. RESP  -LS CTA equal bilat, no wheezes, rales or rhonchi. Symmetric chest movement. No respiratory distress noted. C/V  -S1/S2 auscultated. RRR without appreciable M/R/G. No JVD or carotid bruits. Peripheral pulses equal bilaterally and palpable. No peripheral edema. No reproducible chest wall tenderness. GI  -Abdomen is soft, non-distended, bilateral lower quadrant tenderness, no peritoneal signs. Decreased BS in all quadrants. Rectal exam deferred.   -No CVA tenderness. [place] catheter is not present. MS  -B/L extremities strong muscles strength. Full movements. No gross joint deformities. No swelling, intact sensation symmetrical.   SKIN  -Normal coloration, warm, dry. NEURO  - Awake, alert, oriented x 3, no focal deficits. PSYC  - Appropriate affect. Past Medical History:   Reviewed patient's past medical, surgical, social, family history and allergies.       PMHx   Past Medical History:   Diagnosis Date    Abscess of left foot 4/22/2022    Anemia associated with acute blood loss 4/26/2022    Callus of foot     Right foot - see's Dr. Rosana Mcintosh    Cellulitis of left foot 4/22/2022    Diabetic ulcer of left midfoot associated with type 2 diabetes mellitus, with muscle involvement without evidence of necrosis ([address] 4/26/2022    Diabetic ulcer of left midfoot associated with type 2 diabetes mellitus, with necrosis of muscle ([address] 6/7/2021    Diabetic ulcer of right midfoot associated with type 2 diabetes mellitus, with fat layer exposed ([address] 8/11/2020    Dizziness     positional    Essential hypertension     Follows with PCP    Hyperlipidemia     Lumbar radiculopathy     Septic embolism ([address] 6/13/2020    Subacute osteomyelitis of left foot ([address] 4/22/2022     PSHX:  has a past surgical history that includes Cardiac catheterization (03/2018); [place] tooth extraction; Dental surgery; pr office/outpt visit,procedure only (Left, 4/17/2018); lumbar laminectomy (2018); Toe amputation (Right, 1/8/2021); Achilles tendon surgery (Right, 1/8/2021); hernia repair (Bilateral, 5/27/2020); and Toe amputation (Left, 4/23/2022). Allergies: No Known Allergies  Fam HX: family history includes Diabetes in his father and mother; Heart Attack in his mother; Heart Disease in his father and mother; Kidney Disease in his mother; Other in his mother.   Soc HX:   Social History     Socioeconomic History    Marital status: Single   Tobacco Use    Smoking status: Never    Smokeless tobacco: Never   Vaping Use    Vaping Use: Never used   Substance and Sexual Activity    Alcohol use: Yes     Comment: \"couple beers a night\"    Drug use: No    Sexual activity: Yes     Partners: Female     Social Determinants of Health     Financial Resource Strain: Medium Risk    Difficulty of Paying Living Expenses: Somewhat hard   Food Insecurity: Food Insecurity Present    Worried About Running Out of Food in the Last Year: Sometimes true    Ran Out of Food in the Last Year: Sometimes true   Transportation Needs: No Transportation Needs    Lack of Transportation (Medical): No    Lack of Transportation (Non-Medical): No   Physical Activity: Inactive    Days of Exercise per Week: 0 days    Minutes of Exercise per Session: 0 min   Stress: Stress Concern Present    Feeling of Stress : Rather much   Social Connections: Unknown    Frequency of Communication with Friends and Family: Once a week    Attends Church Services: Never    Attends Club or Organization Meetings: Never    Marital Status: Never    Housing Stability: Low Risk     Unable to Pay for Housing in the Last Year: No    Number of Places Lived in the Last Year: 1    Unstable Housing in the Last Year: No       Medications:   Medications:    Infusions:   PRN Meds:     Labs      CBC:   Recent Labs     08/29/22 0800 08/31/22 2130   WBC 10.5 12.9*   HGB 12.8* 13.0*    390     BMP:    Recent Labs     08/29/22 0800 08/31/22 2130 08/31/22 2327   * 127*  --    K 4.1 6.2* 3.9    92*  --    CO2 24 23  --    BUN 7 11  --    CREATININE 0.9 1.0  --    GLUCOSE 135* 233*  --      Hepatic:   Recent Labs     08/31/22 2130   AST 77*   ALT 20   BILITOT 1.1*   ALKPHOS 125     Lipids:   Lab Results   Component Value Date/Time    CHOL 138 07/06/2022 12:39 AM    CHOL 142 03/12/2020 08:26 AM    HDL 33 07/06/2022 12:39 AM    TRIG 294 07/06/2022 12:39 AM     Hemoglobin A1C:   Lab Results   Component Value Date/Time    LABA1C 10.5 07/28/2022 10:45 AM     TSH: No results found for: TSH  Troponin:   Lab Results   Component Value Date/Time    TROPONINT <0.010 05/21/2022 03:47 PM    TROPONINT <0.010 05/21/2022 09:40 AM    TROPONINT <0.010 05/20/2022 11:04 PM     Lactic Acid: No results for input(s): LACTA in the last 72 hours. BNP: No results for input(s): PROBNP in the last 72 hours.   UA:  Lab Results   Component Value Date/Time    NITRU NEGATIVE 07/06/2022 01:48 AM    COLORU YELLOW 07/06/2022 01:48 AM    WBCUA <1 07/06/2022 01:48 AM    RBCUA <1 07/06/2022 01:48 AM    MUCUS RARE 04/18/2022 03:01 PM perinephric stranding is present but relatively symmetric. May have a few punctate calculi in the right kidney but no hydronephrosis or hydroureter. No calculi in the distal ureter. Left kidney has several small to moderate calculi with the larger measuring up to 5 mm. There is no left-sided hydronephrosis or ureteral calculus. GI/Bowel: The stomach, small bowel, and colon are not dilated. Stomach is under distended and gastritis is not excluded. The appendix is identified and no appendicitis. There is some pneumatosis at the cecum and ascending colon with fluid in the lumen. .  The colonic wall is not thickened and there is no focal surrounding inflammation. Transverse and distal colon are collapsed without wall thickening or pneumatosis. There is no significant rectal fecal bolus. There is no portal venous gas or pneumoperitoneum. Pelvis: Urinary bladder wall is not thickened. Prostate is not enlarged. No free fluid in the pelvis. Peritoneum/Retroperitoneum: No abdominal aortic aneurysm or retroperitoneal hematoma. Several borderline lymph nodes are present along the periaortic and pericaval chain are mildly increased from the old exam.. Bones/Soft Tissues: No acute fracture or destruction at the included bones. Old deformity of upper endplate of L1. No fluid collection in the abdominal wall. There is fluid in the lumen at the cecum and ascending colon. There is pneumatosis along the wall of the cecum to ascending colon without wall thickening or surrounding pericolonic inflammation. No appendicitis. The remaining colon is collapsed. The pneumatosis and fluid are nonspecific but may relate to a colitis and diarrhea. There is no portal venous gas or pneumoperitoneum. Mildly increased lymph nodes in the periaortic and pericaval chain compared with 06/12/2021. These may be post infectious/inflammatory.      CT ABDOMEN PELVIS W IV CONTRAST Additional Contrast? None    Result Date: 8/28/2022  EXAMINATION: CT OF THE ABDOMEN AND PELVIS WITH CONTRAST 8/28/2022 3:28 am TECHNIQUE: CT of the abdomen and pelvis was performed with the administration of intravenous contrast. Multiplanar reformatted images are provided for review. Automated exposure control, iterative reconstruction, and/or weight based adjustment of the mA/kV was utilized to reduce the radiation dose to as low as reasonably achievable. COMPARISON: 06/12/2021 HISTORY: ORDERING SYSTEM PROVIDED HISTORY: Vomiting TECHNOLOGIST PROVIDED HISTORY: Reason for exam:->Vomiting Additional Contrast?->None Decision Support Exception - unselect if not a suspected or confirmed emergency medical condition->Emergency Medical Condition (MA) Reason for Exam: Emesis FINDINGS: Lower Chest: No cardiomegaly. No distal esophageal thickening is identified. No parenchymal infiltrate. Organs: Diffuse hepatic steatosis. The gallbladder, adrenal glands, and pancreas appear unremarkable. No enhancing renal mass. Nonobstructive left renal calculi. No ureteral calculi are identified. No periureteral stranding is seen. GI/Bowel: Visualized loops of bowel appear normal in caliber. No ileus or obstruction. No appendicitis or diverticulitis is identified. Pelvis: No pelvic mass. Bladder appears unremarkable. No free fluid in the pelvis. No bulky pelvic lymphadenopathy is identified. Peritoneum/Retroperitoneum: No abdominal aortic aneurysm. No dissection. No retroperitoneal or mesenteric lymphadenopathy is identified. Hernia plug repair noted bilaterally within the inguinal canal region. Bones/Soft Tissues: Prominent inguinal lymph nodes noted bilaterally, though smaller on the left than compared to the previous examination. The lymph node on the right is mildly increased in size. These may be reactive. 1. No ileus or obstruction is identified. No acute inflammatory bowel wall thickening is seen. 2. Mild fatty infiltration seen within the liver.  3. Nonobstructive left renal calculi. Personally reviewed Lab Studies, Imaging, and discussed case with ED physician/ED provider.     Electronically signed by Devan Barba MD on 9/1/2022 at 2:08 AM

## 2022-09-01 NOTE — ED NOTES
Lab called need lactic and red top hemolyzed and needs to be redAWN.      Irina Cui RN  08/31/22 [id number]

## 2022-09-01 NOTE — PROGRESS NOTES
Wound care consulted for patient for right and left diabetic foot wounds wound care saw patient on 8/29/22 was readmitted on 8/31/22. Patient went to the wound clinic on 8/30/22 has a dressing with compression wrap in place/intact due to be changed again on Friday. Wound care to re-eval tomorrow on Friday. Electronically signed by Mamie Parra RN on 9/1/2022 at 1:16 PM

## 2022-09-01 NOTE — ED NOTES
History     Chief Complaint:  Headache    HPI   Braeden Escobar is a 35 year old male who presents with headaches.  The patient reports developing frequent headaches starting last March, 7 months ago.  He was evaluated at that time and prescribed antibiotics and oral steroids as well as Flonase and Zyrtec for allergies.  He had some improvement in his symptoms with these treatments.  He had a CT scan that he reports was normal.  He saw ENT in May.  He also underwent allergy testing which showed strong allergies to dust and ragweed.  He continues to have frequent headaches with pain in his nose and into his eyes and down into his teeth.  His eyes get puffy with the headaches and sometimes he has numbness in his upper jaw.  He occasionally has numbness in his fingers and toes.  Additionally, his short term memory has not been as good and this seems worse lately.  Per his girlfriend, he is slower mentally and has been more irritable.  He saw ENT again who referred him to a neurologist.  This appointment is scheduled for next week.  He just returned from a camping trip to [place] where it was michelle.  He has tried OTC medications without improvement.  He has no fevers, chills, cough, or vision changes.    Allergies:  Celexa - shakiness  Zoloft - decreased libido     Medications:    Finasteride   Flonase    Past Medical History:    Anxiety   Genital herpes     Past Surgical History:    Undescended testis exploration    Family History:    Anxiety - mother     Social History:  Presents to the ED with his girlfriendNicky  Tobacco Use: No previous or current tobacco use.   Alcohol Use: Occasional alcohol use.   PCP: Domenica Hutchison     Review of Systems   Constitutional: Negative for chills and fever.   HENT: Positive for facial swelling.    Eyes: Negative for visual disturbance.   Respiratory: Negative for cough.    Allergic/Immunologic: Positive for environmental allergies.   Neurological: Positive for  Report given to Balbir Sierra RN  09/01/22 [id number] "numbness and headaches.        Positive for short-term memory issues.   All other systems reviewed and are negative.    Physical Exam   First Vitals:  BP: (!) 141/79  Pulse: 92  Temp: 98.2  F (36.8  C)  Resp: 16  Height: 185.4 cm (6' 1\")  Weight: 68 kg (150 lb)  SpO2: 99 %      Physical Exam  General: Resting comfortably on the gurney  Eyes:  The pupils are equal and round    Conjunctivae and sclerae are normal  ENT:    The nose is normal    Pinnae are normal    The oropharynx is normal  CV:  Regular rate and rhythm     No edema  Resp:  Normal respiratory effort    Speaks in full sentences   GI:  Abdomen is soft, there is no rigidity    No distension    No rebound tenderness   MS:  Normal muscular tone    No asymmetric leg swelling  Skin:  No rash or acute skin lesions noted  Neuro:   Awake, alert, GCS 15    Speech is normal and fluent    Face is symmetric    Moves all extremities    Normal finger-nose-finger     strength equal bilaterally    Equal sensation bilaterally    Hip flexion 5/5 bilaterally     Emergency Department Course     Imaging:  Brain MRI without contrast:  1. Normal brain MRI.  Report per radiology.     Radiographic findings were communicated with the patient who voiced understanding of the findings.     Emergency Department Course:  Nursing notes and vitals reviewed.  I performed an exam of the patient as documented above.     The patient was sent for a brain MRI while in the emergency department, findings above.      Findings and plan explained to the patient. Patient discharged home with instructions regarding supportive care, medications, and reasons to return. The importance of close follow-up was reviewed.     Impression & Plan      Medical Decision Making:  Braeden Escobar is a 35 year old male who presents the emergency department with a headache.  He has been dealing with headaches in his frontal area for the past 6 months.  He recently seems to have noticed some changes in his cognitive " function and also has noticed some tingling in his hands.  For that reason he came here to the emergency department for evaluation.  His neurologic exam here is normal, but given his description of having some cognitive changes there is concern for possible tumor because of as the cause of his headache.  MRI was obtained and no tumor was identified.  Additionally there is no signs of sinusitis or other abnormality noted.  Splane findings to patient.  Stated to him that he could consider backing off on some of his sinusitis regimens as there is no evidence of sinusitis currently.  He is advised to follow-up with his primary care doctor and continue to hold his appointment with neurology to further discuss the cause of his pain symptoms.  He was discharged home encouraged return with any new or worrisome symptoms.    Diagnosis:    ICD-10-CM    1. Acute nonintractable headache, unspecified headache type  R51.9        Disposition:  Discharged to home.       IPatria, am serving as a scribe on 10/20/2020 at 8:35 PM to personally document services performed by Dave Harris MD based on my observations and the provider's statements to me.    Patria Castaneda  10/20/2020   [facility] EMERGENCY DEPT       Dave Harris MD  10/20/20 [id number]

## 2022-09-01 NOTE — PROGRESS NOTES
V2.0  [facility] Hospitalist Progress Note      Name:  Clarisse Monroy /Age/Sex: 1980  (39 y.o. male)   MRN & CSN:  [id number] & [id number] Encounter Date/Time: 2022 10:44 AM EDT    Location:  [address] [place], [place] [address] PCP: RAFA[facility] Day: 2    Assessment and Plan:   Clarisse Monroy is a 39 y.o. 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 Vomiting all yesterday. No diarrhea. Review of Systems:    Review of Systems    10 point ROS negative except as stated above in \"subjective\" section    Objective: Intake/Output Summary (Last 24 hours) at 2022 1044  Last data filed at 2022 0550  Gross per 24 hour   Intake 1210.78 ml   Output 675 ml   Net 535.78 ml        Vitals:   Vitals:    22 0738   BP: 118/68   Pulse: 85   Resp: 18   Temp: 99.9 °F (37.7 °C)   SpO2: 96%       Physical Exam:     General: NAD  Eyes: EOMI  ENT: neck supple  Cardiovascular: Regular rate. Respiratory: Clear to auscultation  Gastrointestinal: Soft, tenderness all 4 quadrants, no guarding, no rebound  Genitourinary: no suprapubic tenderness  Musculoskeletal: No edema, bilateral toe amputations, dressings in place c/d/i  Skin: warm, dry  Neuro: Alert. Psych: Mood appropriate.      Medications:   Medications:    sodium chloride flush  5-40 mL IntraVENous 2 times per day    enoxaparin  40 mg SubCUTAneous Daily    metoclopramide  10 mg IntraVENous Q6H    FLUoxetine  80 mg Oral Daily    insulin glargine  30 Units SubCUTAneous Nightly    losartan  25 mg Oral Daily    metoprolol succinate  25 mg Oral Daily    insulin lispro  0-8 Units SubCUTAneous TID WC    insulin lispro  0-4 Units SubCUTAneous Nightly    pantoprazole (PROTONIX) 40 mg injection  40 mg IntraVENous Daily    ciprofloxacin  400 mg IntraVENous Q12H    metroNIDAZOLE  500 mg IntraVENous Q8H      Infusions:    sodium chloride      sodium chloride 125 mL/hr at 09/01/22 0550    dextrose       PRN Meds: sodium chloride flush, 5-40 mL, PRN  sodium chloride, , PRN  ondansetron, 4 mg, Q8H PRN   Or  ondansetron, 4 mg, Q6H PRN  polyethylene glycol, 17 g, Daily PRN  acetaminophen, 650 mg, Q6H PRN   Or  acetaminophen, 650 mg, Q6H PRN  glucose, 4 tablet, PRN  dextrose bolus, 125 mL, PRN   Or  dextrose bolus, 250 mL, PRN  glucagon (rDNA), 1 mg, PRN  dextrose, , Continuous PRN        Labs      Recent Results (from the past 24 hour(s))   CBC with Auto Differential    Collection Time: 08/31/22  9:30 PM   Result Value Ref Range    WBC 12.9 (H) 4.0 - 10.5 K/CU MM    RBC 4.42 (L) 4.6 - 6.2 M/CU MM    Hemoglobin 13.0 (L) 13.5 - 18.0 GM/DL    Hematocrit 39.4 (L) 42 - 52 %    MCV 89.1 78 - 100 FL    MCH 29.4 27 - 31 PG    MCHC 33.0 32.0 - 36.0 %    RDW 15.1 (H) 11.7 - 14.9 %    Platelets 720 107 - 045 K/CU MM    MPV 9.4 7.5 - 11.1 FL    Differential Type AUTOMATED DIFFERENTIAL     Segs Relative 87.0 (H) 36 - 66 %    Lymphocytes % 5.1 (L) 24 - 44 %    Monocytes % 7.1 (H) 0 - 4 %    Eosinophils % 0.1 0 - 3 %    Basophils % 0.2 0 - 1 %    Segs Absolute 11.2 K/CU MM    Lymphocytes Absolute 0.7 K/CU MM    Monocytes Absolute 0.9 K/CU MM    Eosinophils Absolute 0.0 K/CU MM    Basophils Absolute 0.0 K/CU MM    Nucleated RBC % 0.0 %    Total Nucleated RBC 0.0 K/CU MM    Total Immature Neutrophil 0.07 K/CU MM    Immature Neutrophil % 0.5 (H) 0 - 0.43 %   Comprehensive Metabolic Panel    Collection Time: 08/31/22  9:30 PM   Result Value Ref Range    Sodium 127 (L) 135 - 145 MMOL/L    Potassium 6.2 (HH) 3.5 - 5.1 MMOL/L    Chloride 92 (L) 99 - 110 mMol/L    CO2 23 21 - 32 MMOL/L    BUN 11 6 - 23 MG/DL    Creatinine 1.0 0.9 - 1.3 MG/DL    Glucose 233 (H) 70 - 99 MG/DL    Calcium 8.1 (L) 8.3 - 10.6 MG/DL    Albumin 2.8 (L) 3.4 - 5.0 GM/DL    Total Protein 9.0 (H) 6.4 - 8.2 GM/DL    Total Bilirubin 1.1 (H) 0.0 - 1.0 MG/DL    ALT 20 10 - 40 U/L    AST 77 (H) 15 - 37 IU/L    Alkaline Phosphatase 125 40 - 129 IU/L    GFR Non-African American >60 >60 mL/min/1.73m2    GFR African American >60 >60 mL/min/1.73m2    Anion Gap 12 4 - 16   Lipase    Collection Time: 08/31/22  9:30 PM   Result Value Ref Range    Lipase 20 13 - 60 IU/L   Lactic Acid    Collection Time: 08/31/22  9:30 PM   Result Value Ref Range    Lactate 2.2 (HH) 0.4 - 2.0 mMOL/L   Blood Gas, Venous    Collection Time: 08/31/22  9:30 PM   Result Value Ref Range    pH, Eric 7.39 7.32 - 7.42    pCO2, Eric 41 38 - 52 mmHG    pO2, Eric 50 (H) 28 - 48 mmHG    Base Excess 0 0 - 3.3    HCO3, Venous 24.8 19 - 25 MMOL/L    O2 Sat, Eric 81.6 (H) 50 - 70 %    Comment VBG    Beta-Hydroxybutyrate    Collection Time: 08/31/22 11:27 PM   Result Value Ref Range    Beta-Hydroxybutyrate 3.9 (H) 0.0 - 3.0 MG/DL   Lactic Acid    Collection Time: 08/31/22 11:27 PM   Result Value Ref Range    Lactate 1.7 0.4 - 2.0 mMOL/L   Potassium    Collection Time: 08/31/22 11:27 PM   Result Value Ref Range    Potassium 3.9 3.5 - 5.1 MMOL/L   POCT Glucose    Collection Time: 09/01/22  4:12 AM   Result Value Ref Range    POC Glucose 186 (H) 70 - 99 MG/DL   Basic Metabolic Panel w/ Reflex to MG    Collection Time: 09/01/22  6:00 AM   Result Value Ref Range    Sodium 131 (L) 135 - 145 MMOL/L    Potassium 4.0 3.5 - 5.1 MMOL/L    Chloride 100 99 - 110 mMol/L    CO2 25 21 - 32 MMOL/L    Anion Gap 6 4 - 16    BUN 10 6 - 23 MG/DL    Creatinine 0.9 0.9 - 1.3 MG/DL    Glucose 200 (H) 70 - 99 MG/DL    Calcium 7.2 (L) 8.3 - 10.6 MG/DL    GFR Non-African American >60 >60 mL/min/1.73m2 GFR African American >60 >60 mL/min/1.73m2   CBC with Auto Differential    Collection Time: 09/01/22  6:00 AM   Result Value Ref Range    WBC 13.2 (H) 4.0 - 10.5 K/CU MM    RBC 3.72 (L) 4.6 - 6.2 M/CU MM    Hemoglobin 10.8 (L) 13.5 - 18.0 GM/DL    Hematocrit 33.8 (L) 42 - 52 %    MCV 90.9 78 - 100 FL    MCH 29.0 27 - 31 PG    MCHC 32.0 32.0 - 36.0 %    RDW 15.2 (H) 11.7 - 14.9 %    Platelets 470 111 - 051 K/CU MM    MPV 8.4 7.5 - 11.1 FL    Differential Type AUTOMATED DIFFERENTIAL     Segs Relative 82.0 (H) 36 - 66 %    Lymphocytes % 7.6 (L) 24 - 44 %    Monocytes % 9.6 (H) 0 - 4 %    Eosinophils % 0.1 0 - 3 %    Basophils % 0.1 0 - 1 %    Segs Absolute 10.8 K/CU MM    Lymphocytes Absolute 1.0 K/CU MM    Monocytes Absolute 1.3 K/CU MM    Eosinophils Absolute 0.0 K/CU MM    Basophils Absolute 0.0 K/CU MM    Nucleated RBC % 0.0 %    Total Nucleated RBC 0.0 K/CU MM    Total Immature Neutrophil 0.08 K/CU MM    Immature Neutrophil % 0.6 (H) 0 - 0.43 %   Procalcitonin    Collection Time: 09/01/22  6:00 AM   Result Value Ref Range    Procalcitonin 1.46    POCT Glucose    Collection Time: 09/01/22  7:02 AM   Result Value Ref Range    POC Glucose 160 (H) 70 - 99 MG/DL        Imaging/Diagnostics Last 24 Hours   CT ABDOMEN PELVIS W IV CONTRAST Additional Contrast? None    Result Date: 9/1/2022  EXAMINATION: CT OF THE ABDOMEN AND PELVIS WITH CONTRAST 8/31/2022 11:56 pm TECHNIQUE: CT of the abdomen and pelvis was performed with the administration of intravenous contrast. Multiplanar reformatted images are provided for review. Automated exposure control, iterative reconstruction, and/or weight based adjustment of the mA/kV was utilized to reduce the radiation dose to as low as reasonably achievable.  COMPARISON: 08/28/2022 and 06/12/2021 HISTORY: ORDERING SYSTEM PROVIDED HISTORY: Abdominal pain TECHNOLOGIST PROVIDED HISTORY: Reason for exam:->Abdominal pain Additional Contrast?->None Decision Support Exception - unselect if not a suspected or confirmed emergency medical condition->Emergency Medical Condition (MA) Reason for Exam: Abdominal Pain; Emesis; Nausea FINDINGS: Lower Chest: Has dependent atelectasis in the lung bases but no consolidation or pleural effusion. Heart is not enlarged. Organs: No mass or nodularity is seen within the liver. No mineralized stones in the gallbladder and the biliary system is not dilated. The spleen is not enlarged. No pancreatic calcification, ductal dilatation, or surrounding fluid collection. The adrenal glands are unremarkable. Kidneys are enhancing equally without acute cortical defect. Mild perinephric stranding is present but relatively symmetric. May have a few punctate calculi in the right kidney but no hydronephrosis or hydroureter. No calculi in the distal ureter. Left kidney has several small to moderate calculi with the larger measuring up to 5 mm. There is no left-sided hydronephrosis or ureteral calculus. GI/Bowel: The stomach, small bowel, and colon are not dilated. Stomach is under distended and gastritis is not excluded. The appendix is identified and no appendicitis. There is some pneumatosis at the cecum and ascending colon with fluid in the lumen. .  The colonic wall is not thickened and there is no focal surrounding inflammation. Transverse and distal colon are collapsed without wall thickening or pneumatosis. There is no significant rectal fecal bolus. There is no portal venous gas or pneumoperitoneum. Pelvis: Urinary bladder wall is not thickened. Prostate is not enlarged. No free fluid in the pelvis. Peritoneum/Retroperitoneum: No abdominal aortic aneurysm or retroperitoneal hematoma. Several borderline lymph nodes are present along the periaortic and pericaval chain are mildly increased from the old exam.. Bones/Soft Tissues: No acute fracture or destruction at the included bones. Old deformity of upper endplate of L1. No fluid collection in the abdominal wall. There is fluid in the lumen at the cecum and ascending colon. There is pneumatosis along the wall of the cecum to ascending colon without wall thickening or surrounding pericolonic inflammation. No appendicitis. The remaining colon is collapsed. The pneumatosis and fluid are nonspecific but may relate to a colitis and diarrhea. There is no portal venous gas or pneumoperitoneum. Mildly increased lymph nodes in the periaortic and pericaval chain compared with 06/12/2021. These may be post infectious/inflammatory.        Electronically signed by Cristian Bentley MD on 9/1/2022 at 10:44 AM

## 2022-09-02 ENCOUNTER — APPOINTMENT (OUTPATIENT)
Dept: CT IMAGING | Age: 42
DRG: 254 | End: 2022-09-02
Payer: COMMERCIAL

## 2022-09-02 ENCOUNTER — APPOINTMENT (OUTPATIENT)
Dept: GENERAL RADIOLOGY | Age: 42
DRG: 254 | End: 2022-09-02
Payer: COMMERCIAL

## 2022-09-02 ENCOUNTER — ANESTHESIA EVENT (OUTPATIENT)
Dept: OPERATING ROOM | Age: 42
DRG: 710 | End: 2022-09-02
Payer: COMMERCIAL

## 2022-09-02 ENCOUNTER — ANESTHESIA (OUTPATIENT)
Dept: OPERATING ROOM | Age: 42
DRG: 710 | End: 2022-09-02
Payer: COMMERCIAL

## 2022-09-02 ENCOUNTER — HOSPITAL ENCOUNTER (INPATIENT)
Age: 42
LOS: 5 days | Discharge: NURSING, REHAB OR LONG-TERM CARE | DRG: 254 | End: 2022-09-08
Attending: EMERGENCY MEDICINE | Admitting: SURGERY
Payer: COMMERCIAL

## 2022-09-02 VITALS
SYSTOLIC BLOOD PRESSURE: 137 MMHG | DIASTOLIC BLOOD PRESSURE: 80 MMHG | TEMPERATURE: 98.6 F | OXYGEN SATURATION: 98 % | WEIGHT: 184.75 LBS | RESPIRATION RATE: 18 BRPM | HEART RATE: 88 BPM | BODY MASS INDEX: 25.02 KG/M2 | HEIGHT: 72 IN

## 2022-09-02 DIAGNOSIS — M72.6 NECROTIZING FASCIITIS (HCC): Primary | ICD-10-CM

## 2022-09-02 DIAGNOSIS — I96 NECROSIS (HCC): ICD-10-CM

## 2022-09-02 LAB
ALBUMIN SERPL-MCNC: 2.9 GM/DL (ref 3.4–5)
ALP BLD-CCNC: 99 IU/L (ref 40–129)
ALT SERPL-CCNC: 17 U/L (ref 10–40)
ANION GAP SERPL CALCULATED.3IONS-SCNC: 13 MMOL/L (ref 4–16)
AST SERPL-CCNC: 35 IU/L (ref 15–37)
BASOPHILS ABSOLUTE: 0 K/CU MM
BASOPHILS ABSOLUTE: 0 K/CU MM
BASOPHILS RELATIVE PERCENT: 0.2 % (ref 0–1)
BASOPHILS RELATIVE PERCENT: 0.3 % (ref 0–1)
BILIRUB SERPL-MCNC: 0.7 MG/DL (ref 0–1)
BUN BLDV-MCNC: 6 MG/DL (ref 6–23)
CALCIUM SERPL-MCNC: 8.6 MG/DL (ref 8.3–10.6)
CHLORIDE BLD-SCNC: 95 MMOL/L (ref 99–110)
CO2: 24 MMOL/L (ref 21–32)
CREAT SERPL-MCNC: 1 MG/DL (ref 0.9–1.3)
CULTURE: NORMAL
CULTURE: NORMAL
DIFFERENTIAL TYPE: ABNORMAL
DIFFERENTIAL TYPE: ABNORMAL
EOSINOPHILS ABSOLUTE: 0 K/CU MM
EOSINOPHILS ABSOLUTE: 0.1 K/CU MM
EOSINOPHILS RELATIVE PERCENT: 0.3 % (ref 0–3)
EOSINOPHILS RELATIVE PERCENT: 0.7 % (ref 0–3)
ERYTHROCYTE SEDIMENTATION RATE: >130 MM/HR (ref 0–15)
GFR AFRICAN AMERICAN: >60 ML/MIN/1.73M2
GFR NON-AFRICAN AMERICAN: >60 ML/MIN/1.73M2
GLUCOSE BLD-MCNC: 143 MG/DL (ref 70–99)
GLUCOSE BLD-MCNC: 143 MG/DL (ref 70–99)
GLUCOSE BLD-MCNC: 98 MG/DL (ref 70–99)
HCT VFR BLD CALC: 34.5 % (ref 42–52)
HCT VFR BLD CALC: 38.7 % (ref 42–52)
HEMOGLOBIN: 10.9 GM/DL (ref 13.5–18)
HEMOGLOBIN: 12.1 GM/DL (ref 13.5–18)
IMMATURE NEUTROPHIL %: 0.6 % (ref 0–0.43)
IMMATURE NEUTROPHIL %: 0.6 % (ref 0–0.43)
LACTATE: 1.4 MMOL/L (ref 0.4–2)
LYMPHOCYTES ABSOLUTE: 1 K/CU MM
LYMPHOCYTES ABSOLUTE: 1.3 K/CU MM
LYMPHOCYTES RELATIVE PERCENT: 12 % (ref 24–44)
LYMPHOCYTES RELATIVE PERCENT: 7.1 % (ref 24–44)
Lab: NORMAL
Lab: NORMAL
MCH RBC QN AUTO: 28.5 PG (ref 27–31)
MCH RBC QN AUTO: 28.7 PG (ref 27–31)
MCHC RBC AUTO-ENTMCNC: 31.3 % (ref 32–36)
MCHC RBC AUTO-ENTMCNC: 31.6 % (ref 32–36)
MCV RBC AUTO: 90.8 FL (ref 78–100)
MCV RBC AUTO: 91.3 FL (ref 78–100)
MONOCYTES ABSOLUTE: 1 K/CU MM
MONOCYTES ABSOLUTE: 1.1 K/CU MM
MONOCYTES RELATIVE PERCENT: 8.1 % (ref 0–4)
MONOCYTES RELATIVE PERCENT: 9 % (ref 0–4)
NUCLEATED RBC %: 0 %
NUCLEATED RBC %: 0 %
PDW BLD-RTO: 15.2 % (ref 11.7–14.9)
PDW BLD-RTO: 15.3 % (ref 11.7–14.9)
PLATELET # BLD: 390 K/CU MM (ref 140–440)
PLATELET # BLD: 413 K/CU MM (ref 140–440)
PMV BLD AUTO: 8.7 FL (ref 7.5–11.1)
PMV BLD AUTO: 9.6 FL (ref 7.5–11.1)
POTASSIUM SERPL-SCNC: 3.9 MMOL/L (ref 3.5–5.1)
RBC # BLD: 3.8 M/CU MM (ref 4.6–6.2)
RBC # BLD: 4.24 M/CU MM (ref 4.6–6.2)
SEGMENTED NEUTROPHILS ABSOLUTE COUNT: 11.6 K/CU MM
SEGMENTED NEUTROPHILS ABSOLUTE COUNT: 8.6 K/CU MM
SEGMENTED NEUTROPHILS RELATIVE PERCENT: 77.4 % (ref 36–66)
SEGMENTED NEUTROPHILS RELATIVE PERCENT: 83.7 % (ref 36–66)
SODIUM BLD-SCNC: 132 MMOL/L (ref 135–145)
SPECIMEN: NORMAL
SPECIMEN: NORMAL
TOTAL IMMATURE NEUTOROPHIL: 0.07 K/CU MM
TOTAL IMMATURE NEUTOROPHIL: 0.08 K/CU MM
TOTAL NUCLEATED RBC: 0 K/CU MM
TOTAL NUCLEATED RBC: 0 K/CU MM
TOTAL PROTEIN: 8.7 GM/DL (ref 6.4–8.2)
WBC # BLD: 11.1 K/CU MM (ref 4–10.5)
WBC # BLD: 13.9 K/CU MM (ref 4–10.5)

## 2022-09-02 PROCEDURE — 6360000002 HC RX W HCPCS: Performed by: INTERNAL MEDICINE

## 2022-09-02 PROCEDURE — 99285 EMERGENCY DEPT VISIT HI MDM: CPT

## 2022-09-02 PROCEDURE — 3600000003 HC SURGERY LEVEL 3 BASE: Performed by: SURGERY

## 2022-09-02 PROCEDURE — 85025 COMPLETE CBC W/AUTO DIFF WBC: CPT

## 2022-09-02 PROCEDURE — 96366 THER/PROPH/DIAG IV INF ADDON: CPT

## 2022-09-02 PROCEDURE — 6360000002 HC RX W HCPCS

## 2022-09-02 PROCEDURE — 82962 GLUCOSE BLOOD TEST: CPT

## 2022-09-02 PROCEDURE — 6360000004 HC RX CONTRAST MEDICATION: Performed by: EMERGENCY MEDICINE

## 2022-09-02 PROCEDURE — 6360000002 HC RX W HCPCS: Performed by: SURGERY

## 2022-09-02 PROCEDURE — 99232 SBSQ HOSP IP/OBS MODERATE 35: CPT | Performed by: NURSE PRACTITIONER

## 2022-09-02 PROCEDURE — 36415 COLL VENOUS BLD VENIPUNCTURE: CPT

## 2022-09-02 PROCEDURE — APPNB30 APP NON BILLABLE TIME 0-30 MINS: Performed by: NURSE PRACTITIONER

## 2022-09-02 PROCEDURE — 2580000003 HC RX 258: Performed by: INTERNAL MEDICINE

## 2022-09-02 PROCEDURE — 27882 AMPUTATION OF LOWER LEG: CPT | Performed by: SURGERY

## 2022-09-02 PROCEDURE — 85652 RBC SED RATE AUTOMATED: CPT

## 2022-09-02 PROCEDURE — 73706 CT ANGIO LWR EXTR W/O&W/DYE: CPT

## 2022-09-02 PROCEDURE — 87040 BLOOD CULTURE FOR BACTERIA: CPT

## 2022-09-02 PROCEDURE — 27882 AMPUTATION OF LOWER LEG: CPT | Performed by: NURSE PRACTITIONER

## 2022-09-02 PROCEDURE — 2580000003 HC RX 258: Performed by: EMERGENCY MEDICINE

## 2022-09-02 PROCEDURE — 96367 TX/PROPH/DG ADDL SEQ IV INF: CPT

## 2022-09-02 PROCEDURE — 2500000003 HC RX 250 WO HCPCS: Performed by: SURGERY

## 2022-09-02 PROCEDURE — 96375 TX/PRO/DX INJ NEW DRUG ADDON: CPT

## 2022-09-02 PROCEDURE — 3600000013 HC SURGERY LEVEL 3 ADDTL 15MIN: Performed by: SURGERY

## 2022-09-02 PROCEDURE — 2500000003 HC RX 250 WO HCPCS

## 2022-09-02 PROCEDURE — 3700000001 HC ADD 15 MINUTES (ANESTHESIA): Performed by: SURGERY

## 2022-09-02 PROCEDURE — 3700000000 HC ANESTHESIA ATTENDED CARE: Performed by: SURGERY

## 2022-09-02 PROCEDURE — 2780000010 HC IMPLANT OTHER: Performed by: SURGERY

## 2022-09-02 PROCEDURE — 88307 TISSUE EXAM BY PATHOLOGIST: CPT

## 2022-09-02 PROCEDURE — 94761 N-INVAS EAR/PLS OXIMETRY MLT: CPT

## 2022-09-02 PROCEDURE — 99211 OFF/OP EST MAY X REQ PHY/QHP: CPT

## 2022-09-02 PROCEDURE — C9113 INJ PANTOPRAZOLE SODIUM, VIA: HCPCS | Performed by: INTERNAL MEDICINE

## 2022-09-02 PROCEDURE — 0Y6H0Z1 DETACHMENT AT RIGHT LOWER LEG, HIGH, OPEN APPROACH: ICD-10-PCS | Performed by: SURGERY

## 2022-09-02 PROCEDURE — 97605 NEG PRS WND THER DME<=50SQCM: CPT | Performed by: SURGERY

## 2022-09-02 PROCEDURE — 73630 X-RAY EXAM OF FOOT: CPT

## 2022-09-02 PROCEDURE — 2709999900 HC NON-CHARGEABLE SUPPLY: Performed by: SURGERY

## 2022-09-02 PROCEDURE — 1800000000 HC LEAVE OF ABSENCE

## 2022-09-02 PROCEDURE — 96365 THER/PROPH/DIAG IV INF INIT: CPT

## 2022-09-02 PROCEDURE — 6370000000 HC RX 637 (ALT 250 FOR IP): Performed by: INTERNAL MEDICINE

## 2022-09-02 PROCEDURE — 83605 ASSAY OF LACTIC ACID: CPT

## 2022-09-02 PROCEDURE — 6360000002 HC RX W HCPCS: Performed by: EMERGENCY MEDICINE

## 2022-09-02 PROCEDURE — 2580000003 HC RX 258

## 2022-09-02 PROCEDURE — 99222 1ST HOSP IP/OBS MODERATE 55: CPT | Performed by: SURGERY

## 2022-09-02 PROCEDURE — 80053 COMPREHEN METABOLIC PANEL: CPT

## 2022-09-02 RX ORDER — PROPOFOL 10 MG/ML
INJECTION, EMULSION INTRAVENOUS PRN
Status: DISCONTINUED | OUTPATIENT
Start: 2022-09-02 | End: 2022-09-03 | Stop reason: SDUPTHER

## 2022-09-02 RX ORDER — SODIUM CHLORIDE, SODIUM LACTATE, POTASSIUM CHLORIDE, CALCIUM CHLORIDE 600; 310; 30; 20 MG/100ML; MG/100ML; MG/100ML; MG/100ML
INJECTION, SOLUTION INTRAVENOUS CONTINUOUS PRN
Status: DISCONTINUED | OUTPATIENT
Start: 2022-09-02 | End: 2022-09-03 | Stop reason: SDUPTHER

## 2022-09-02 RX ORDER — CEFAZOLIN SODIUM 2 G/50ML
SOLUTION INTRAVENOUS PRN
Status: DISCONTINUED | OUTPATIENT
Start: 2022-09-02 | End: 2022-09-03 | Stop reason: SDUPTHER

## 2022-09-02 RX ORDER — METRONIDAZOLE 500 MG/1
500 TABLET ORAL 3 TIMES DAILY
Qty: 84 TABLET | Refills: 0 | Status: ON HOLD | OUTPATIENT
Start: 2022-09-02 | End: 2022-09-16 | Stop reason: HOSPADM

## 2022-09-02 RX ORDER — PANTOPRAZOLE SODIUM 20 MG/1
20 TABLET, DELAYED RELEASE ORAL
Qty: 30 TABLET | Refills: 0 | Status: SHIPPED | OUTPATIENT
Start: 2022-09-02 | End: 2022-11-17 | Stop reason: SDUPTHER

## 2022-09-02 RX ORDER — MIDAZOLAM HYDROCHLORIDE 1 MG/ML
INJECTION INTRAMUSCULAR; INTRAVENOUS PRN
Status: DISCONTINUED | OUTPATIENT
Start: 2022-09-02 | End: 2022-09-03 | Stop reason: SDUPTHER

## 2022-09-02 RX ORDER — PANTOPRAZOLE SODIUM 40 MG/1
40 TABLET, DELAYED RELEASE ORAL
Qty: 30 TABLET | Refills: 0 | Status: SHIPPED | OUTPATIENT
Start: 2022-09-02 | End: 2022-09-02 | Stop reason: SDUPTHER

## 2022-09-02 RX ORDER — DEXAMETHASONE SODIUM PHOSPHATE 4 MG/ML
INJECTION, SOLUTION INTRA-ARTICULAR; INTRALESIONAL; INTRAMUSCULAR; INTRAVENOUS; SOFT TISSUE PRN
Status: DISCONTINUED | OUTPATIENT
Start: 2022-09-02 | End: 2022-09-03 | Stop reason: SDUPTHER

## 2022-09-02 RX ORDER — KETAMINE HCL 50MG/ML(1)
SYRINGE (ML) INTRAVENOUS PRN
Status: DISCONTINUED | OUTPATIENT
Start: 2022-09-02 | End: 2022-09-03 | Stop reason: SDUPTHER

## 2022-09-02 RX ORDER — ROCURONIUM BROMIDE 10 MG/ML
INJECTION, SOLUTION INTRAVENOUS PRN
Status: DISCONTINUED | OUTPATIENT
Start: 2022-09-02 | End: 2022-09-03 | Stop reason: SDUPTHER

## 2022-09-02 RX ORDER — ONDANSETRON 2 MG/ML
4 INJECTION INTRAMUSCULAR; INTRAVENOUS ONCE
Status: COMPLETED | OUTPATIENT
Start: 2022-09-02 | End: 2022-09-02

## 2022-09-02 RX ORDER — METRONIDAZOLE 500 MG/1
500 TABLET ORAL 3 TIMES DAILY
Qty: 90 TABLET | Refills: 2 | Status: SHIPPED | OUTPATIENT
Start: 2022-09-02 | End: 2022-09-02 | Stop reason: SDUPTHER

## 2022-09-02 RX ADMIN — FLUOXETINE 80 MG: 20 CAPSULE ORAL at 08:31

## 2022-09-02 RX ADMIN — METRONIDAZOLE 500 MG: 500 INJECTION, SOLUTION INTRAVENOUS at 08:41

## 2022-09-02 RX ADMIN — DEXMEDETOMIDINE 4 MCG: 100 INJECTION, SOLUTION, CONCENTRATE INTRAVENOUS at 23:48

## 2022-09-02 RX ADMIN — CIPROFLOXACIN 400 MG: 2 INJECTION, SOLUTION INTRAVENOUS at 10:30

## 2022-09-02 RX ADMIN — METOCLOPRAMIDE HYDROCHLORIDE 10 MG: 5 INJECTION INTRAMUSCULAR; INTRAVENOUS at 08:30

## 2022-09-02 RX ADMIN — DEXAMETHASONE SODIUM PHOSPHATE 4 MG: 4 INJECTION, SOLUTION INTRAMUSCULAR; INTRAVENOUS at 23:20

## 2022-09-02 RX ADMIN — LOSARTAN POTASSIUM 25 MG: 25 TABLET, FILM COATED ORAL at 08:30

## 2022-09-02 RX ADMIN — METOCLOPRAMIDE HYDROCHLORIDE 10 MG: 5 INJECTION INTRAMUSCULAR; INTRAVENOUS at 03:38

## 2022-09-02 RX ADMIN — SODIUM CHLORIDE, PRESERVATIVE FREE 10 ML: 5 INJECTION INTRAVENOUS at 08:31

## 2022-09-02 RX ADMIN — CEFAZOLIN SODIUM 2000 MG: 2 SOLUTION INTRAVENOUS at 23:20

## 2022-09-02 RX ADMIN — ONDANSETRON 4 MG: 2 INJECTION INTRAMUSCULAR; INTRAVENOUS at 07:23

## 2022-09-02 RX ADMIN — ONDANSETRON 4 MG: 2 INJECTION INTRAMUSCULAR; INTRAVENOUS at 19:15

## 2022-09-02 RX ADMIN — ENOXAPARIN SODIUM 40 MG: 100 INJECTION SUBCUTANEOUS at 08:30

## 2022-09-02 RX ADMIN — METRONIDAZOLE 500 MG: 500 INJECTION, SOLUTION INTRAVENOUS at 02:18

## 2022-09-02 RX ADMIN — DEXMEDETOMIDINE 8 MCG: 100 INJECTION, SOLUTION, CONCENTRATE INTRAVENOUS at 23:17

## 2022-09-02 RX ADMIN — METOPROLOL SUCCINATE 25 MG: 25 TABLET, EXTENDED RELEASE ORAL at 08:30

## 2022-09-02 RX ADMIN — PROPOFOL 100 MG: 10 INJECTION, EMULSION INTRAVENOUS at 23:10

## 2022-09-02 RX ADMIN — SODIUM CHLORIDE, POTASSIUM CHLORIDE, SODIUM LACTATE AND CALCIUM CHLORIDE: 600; 310; 30; 20 INJECTION, SOLUTION INTRAVENOUS at 22:56

## 2022-09-02 RX ADMIN — IOPAMIDOL 100 ML: 755 INJECTION, SOLUTION INTRAVENOUS at 21:02

## 2022-09-02 RX ADMIN — Medication 50 MG: at 23:08

## 2022-09-02 RX ADMIN — MIDAZOLAM 2 MG: 1 INJECTION INTRAMUSCULAR; INTRAVENOUS at 23:00

## 2022-09-02 RX ADMIN — HYDROMORPHONE HYDROCHLORIDE 0.5 MG: 1 INJECTION, SOLUTION INTRAMUSCULAR; INTRAVENOUS; SUBCUTANEOUS at 23:56

## 2022-09-02 RX ADMIN — SODIUM CHLORIDE 25 ML: 9 INJECTION, SOLUTION INTRAVENOUS at 02:17

## 2022-09-02 RX ADMIN — VANCOMYCIN HYDROCHLORIDE 2000 MG: 1 INJECTION, POWDER, LYOPHILIZED, FOR SOLUTION INTRAVENOUS at 19:53

## 2022-09-02 RX ADMIN — DEXMEDETOMIDINE 4 MCG: 100 INJECTION, SOLUTION, CONCENTRATE INTRAVENOUS at 23:36

## 2022-09-02 RX ADMIN — SODIUM CHLORIDE: 9 INJECTION, SOLUTION INTRAVENOUS at 08:37

## 2022-09-02 RX ADMIN — CEFEPIME 2000 MG: 2 INJECTION, POWDER, FOR SOLUTION INTRAVENOUS at 19:14

## 2022-09-02 RX ADMIN — SODIUM CHLORIDE, PRESERVATIVE FREE 40 MG: 5 INJECTION INTRAVENOUS at 08:30

## 2022-09-02 RX ADMIN — ROCURONIUM BROMIDE 50 MG: 10 INJECTION INTRAVENOUS at 23:11

## 2022-09-02 ASSESSMENT — PAIN - FUNCTIONAL ASSESSMENT
PAIN_FUNCTIONAL_ASSESSMENT: 0-10

## 2022-09-02 ASSESSMENT — ENCOUNTER SYMPTOMS
NAUSEA: 1
EYE DISCHARGE: 0
EYE REDNESS: 0
CHEST TIGHTNESS: 0
COLOR CHANGE: 0
SORE THROAT: 0
SHORTNESS OF BREATH: 0
VOMITING: 1

## 2022-09-02 ASSESSMENT — PAIN DESCRIPTION - LOCATION
LOCATION: FOOT

## 2022-09-02 ASSESSMENT — PAIN SCALES - GENERAL
PAINLEVEL_OUTOF10: 10
PAINLEVEL_OUTOF10: 4
PAINLEVEL_OUTOF10: 2
PAINLEVEL_OUTOF10: 4

## 2022-09-02 ASSESSMENT — PAIN DESCRIPTION - ORIENTATION
ORIENTATION: RIGHT;LEFT
ORIENTATION: RIGHT;LEFT
ORIENTATION: LEFT;RIGHT
ORIENTATION: RIGHT;LEFT

## 2022-09-02 ASSESSMENT — LIFESTYLE VARIABLES: SMOKING_STATUS: 1

## 2022-09-02 NOTE — DISCHARGE INSTRUCTIONS
You have air bubbles in the wall of part of your colon. This likely represents bacterial invasion into the submucosal layers of your intestine. Please take Flagyl three times daily for 90 days or until the air bubbles have disappeared. You will need to get repeat imaging of your abdomen to see if the bubbles have gone away. This is something that will need to be ordered by a gastroenterologist or your PCP if he/she is willing to manage this condition. You cannot drink alcohol while taking Flagyl. It will result in a \"disulfiram reaction\" that will manifest as flushing, elevated heart rate, nausea, and generally make you feel awful.

## 2022-09-02 NOTE — PROGRESS NOTES
V2.0  [facility] Hospitalist Progress Note      Name:  Dellie Goldberg /Age/Sex: 1980  (39 y.o. male)   MRN & CSN:  [id number] & [id number] Encounter Date/Time: 2022 10:44 AM EDT    Location:  [address] [place] [address] PCP: Candy Juárez, APRN - [facility] Day: 3    Assessment and Plan:   Dellie Goldberg is a 39 y.o. 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 Advance diet to fulls, then regular. If tolerating will discharge home today. Review of Systems:    Review of Systems    10 point ROS negative except as stated above in \"subjective\" section    Objective: Intake/Output Summary (Last 24 hours) at 2022 1019  Last data filed at 2022 0448  Gross per 24 hour   Intake 480 ml   Output --   Net 480 ml          Vitals:   Vitals:    22 0828   BP: (!) 161/91   Pulse: 91   Resp: 18   Temp: 98.7 °F (37.1 °C)   SpO2: 96%       Physical Exam:     General: NAD  Eyes: EOMI  ENT: neck supple  Cardiovascular: Regular rate. Respiratory: Clear to auscultation  Gastrointestinal: Soft, NT/ND/+BS  Genitourinary: no suprapubic tenderness  Musculoskeletal: No edema, bilateral toe amputations, dressings in place c/d/i  Skin: warm, dry  Neuro: Alert. Psych: Mood appropriate.      Medications:   Medications:    sodium chloride flush  5-40 mL IntraVENous 2 times per day    enoxaparin  40 mg SubCUTAneous Daily    FLUoxetine  80 mg Oral Daily    insulin glargine  30 Units SubCUTAneous Nightly losartan  25 mg Oral Daily    metoprolol succinate  25 mg Oral Daily    insulin lispro  0-8 Units SubCUTAneous TID WC    insulin lispro  0-4 Units SubCUTAneous Nightly    pantoprazole (PROTONIX) 40 mg injection  40 mg IntraVENous Daily    ciprofloxacin  400 mg IntraVENous Q12H    metroNIDAZOLE  500 mg IntraVENous Q8H      Infusions:    sodium chloride 20 mL/hr at 09/02/22 0837    dextrose       PRN Meds: sodium chloride flush, 5-40 mL, PRN  sodium chloride, , PRN  ondansetron, 4 mg, Q8H PRN   Or  ondansetron, 4 mg, Q6H PRN  polyethylene glycol, 17 g, Daily PRN  acetaminophen, 650 mg, Q6H PRN   Or  acetaminophen, 650 mg, Q6H PRN  glucose, 4 tablet, PRN  dextrose bolus, 125 mL, PRN   Or  dextrose bolus, 250 mL, PRN  glucagon (rDNA), 1 mg, PRN  dextrose, , Continuous PRN      Labs      Recent Results (from the past 24 hour(s))   POCT Glucose    Collection Time: 09/01/22 11:24 AM   Result Value Ref Range    POC Glucose 155 (H) 70 - 99 MG/DL   POCT Glucose    Collection Time: 09/01/22  4:06 PM   Result Value Ref Range    POC Glucose 110 (H) 70 - 99 MG/DL   POCT Glucose    Collection Time: 09/01/22  8:29 PM   Result Value Ref Range    POC Glucose 168 (H) 70 - 99 MG/DL   POCT Glucose    Collection Time: 09/02/22  6:22 AM   Result Value Ref Range    POC Glucose 98 70 - 99 MG/DL        Imaging/Diagnostics Last 24 Hours   CT ABDOMEN PELVIS W IV CONTRAST Additional Contrast? None    Result Date: 9/1/2022  EXAMINATION: CT OF THE ABDOMEN AND PELVIS WITH CONTRAST 8/31/2022 11:56 pm TECHNIQUE: CT of the abdomen and pelvis was performed with the administration of intravenous contrast. Multiplanar reformatted images are provided for review. Automated exposure control, iterative reconstruction, and/or weight based adjustment of the mA/kV was utilized to reduce the radiation dose to as low as reasonably achievable.  COMPARISON: 08/28/2022 and 06/12/2021 HISTORY: ORDERING SYSTEM PROVIDED HISTORY: Abdominal pain TECHNOLOGIST PROVIDED HISTORY: Reason for exam:->Abdominal pain Additional Contrast?->None Decision Support Exception - unselect if not a suspected or confirmed emergency medical condition->Emergency Medical Condition (MA) Reason for Exam: Abdominal Pain; Emesis; Nausea FINDINGS: Lower Chest: Has dependent atelectasis in the lung bases but no consolidation or pleural effusion. Heart is not enlarged. Organs: No mass or nodularity is seen within the liver. No mineralized stones in the gallbladder and the biliary system is not dilated. The spleen is not enlarged. No pancreatic calcification, ductal dilatation, or surrounding fluid collection. The adrenal glands are unremarkable. Kidneys are enhancing equally without acute cortical defect. Mild perinephric stranding is present but relatively symmetric. May have a few punctate calculi in the right kidney but no hydronephrosis or hydroureter. No calculi in the distal ureter. Left kidney has several small to moderate calculi with the larger measuring up to 5 mm. There is no left-sided hydronephrosis or ureteral calculus. GI/Bowel: The stomach, small bowel, and colon are not dilated. Stomach is under distended and gastritis is not excluded. The appendix is identified and no appendicitis. There is some pneumatosis at the cecum and ascending colon with fluid in the lumen. .  The colonic wall is not thickened and there is no focal surrounding inflammation. Transverse and distal colon are collapsed without wall thickening or pneumatosis. There is no significant rectal fecal bolus. There is no portal venous gas or pneumoperitoneum. Pelvis: Urinary bladder wall is not thickened. Prostate is not enlarged. No free fluid in the pelvis. Peritoneum/Retroperitoneum: No abdominal aortic aneurysm or retroperitoneal hematoma. Several borderline lymph nodes are present along the periaortic and pericaval chain are mildly increased from the old exam..  Bones/Soft Tissues: No acute fracture or destruction at the included bones. Old deformity of upper endplate of L1. No fluid collection in the abdominal wall. There is fluid in the lumen at the cecum and ascending colon. There is pneumatosis along the wall of the cecum to ascending colon without wall thickening or surrounding pericolonic inflammation. No appendicitis. The remaining colon is collapsed. The pneumatosis and fluid are nonspecific but may relate to a colitis and diarrhea. There is no portal venous gas or pneumoperitoneum. Mildly increased lymph nodes in the periaortic and pericaval chain compared with 06/12/2021. These may be post infectious/inflammatory.        Electronically signed by Tristin Orr MD on 9/2/2022 at 10:19 AM

## 2022-09-02 NOTE — PROGRESS NOTES
Outpatient Pharmacy Progress Note for Meds-to-Beds    Total number of Prescriptions Filled: 3  The following medications were dispensed to the patient during the discharge process:  Pantoprazole 40mg  Metronidazole 20mg  Pantroprazole 20mg    Additional Documentation:  Medication(s) were delivered to the patient's room prior to discharge  Patient already received pantoprazole 40mg prior to prescription change to Pantoprazole 20mg. Thank you for letting us serve your patients.   [facility]    [address], [address] [facility]    Phone: [phone]    Fax: [phone]

## 2022-09-02 NOTE — PROGRESS NOTES
Outpatient Pharmacy Progress Note for Meds-to-Beds    Total number of Prescriptions Filled: 2  The following medications were dispensed to the patient during the discharge process:  Metronidazole 500mg  Pantoprazole 40mg    Additional Documentation:  Medication(s) were delivered to the patient's room prior to discharge      Thank you for letting us serve your patients.   [address] [facility] [place]    [address], [address] [facility]    Phone: [phone]    Fax: [phone]

## 2022-09-02 NOTE — PROGRESS NOTES
Outpatient Pharmacy Progress Note for Meds-to-Beds    Total number of Prescriptions Filled: 3  The following medications were dispensed to the patient during the discharge process:  Pantoprazole 20mg  Pantoprazole 40mg  Metronidazole 500mg    Additional Documentation:  Medication(s) were delivered to the patient's room prior to discharge  Patient had already received pantoprazole 40mg when new Rx was sent for pantoprazole 20mg      Thank you for letting us serve your patients.   [facility]    [address], [address] [facility]    Phone: [phone]    Fax: [phone]

## 2022-09-02 NOTE — PROGRESS NOTES
General Surgery-Dr. BARBA [facility] Day: 3    Chief Complaint on Admission: nausea/vomiting      Subjective:     Fatuma Mcmahan is a 39 y.o. male that presented with nausea and vomiting. CT showed pneumatosis of the cecum. Denies abdominal pain. Tolerating clears liquids, + BM, denies nausea/vomiting    ROS:  Review of Systems  Negative unless above    Allergies  Patient has no known allergies. Diagnosis Date    Abscess of left foot 2022    Anemia associated with acute blood loss 2022    Callus of foot     Right foot - see's Dr. Macario Milan    Cellulitis of left foot 2022    Diabetic ulcer of left midfoot associated with type 2 diabetes mellitus, with muscle involvement without evidence of necrosis ([address] 2022    Diabetic ulcer of left midfoot associated with type 2 diabetes mellitus, with necrosis of muscle ([address] 2021    Diabetic ulcer of right midfoot associated with type 2 diabetes mellitus, with fat layer exposed ([address] 2020    Dizziness     positional    Essential hypertension     Follows with PCP    Hyperlipidemia     Lumbar radiculopathy     Septic embolism ([address] 2020    Subacute osteomyelitis of left foot ([address] 2022       Objective:     Vitals:    22 0828   BP: (!) 161/91   Pulse: 91   Resp: 18   Temp: 98.7 °F (37.1 °C)   SpO2: 96%       TEMPERATURE:  Current -Temp: 98.7 °F (37.1 °C); Max - Temp  Av.3 °F (37.4 °C)  Min: 98.7 °F (37.1 °C)  Max: 99.9 °F (37.7 °C)    No intake/output data recorded. I/O last 3 completed shifts: In: 1690.8 [P.O.:480; I.V.:1210.8]  Out: 675 [Urine:675]      Physical Exam:  Physical Exam  Constitutional:       Appearance: Normal appearance. HENT:      Head: Normocephalic and atraumatic. Right Ear: External ear normal.      Left Ear: External ear normal.      Nose: Nose normal.      Mouth/Throat:      Mouth: Mucous membranes are moist.   Eyes:      Extraocular Movements: Extraocular movements intact.       Pupils: Pupils are equal, round, and reactive to light. Cardiovascular:      Rate and Rhythm: Normal rate and regular rhythm. Pulses: Normal pulses. Heart sounds: Normal heart sounds. Pulmonary:      Effort: Pulmonary effort is normal.      Breath sounds: Normal breath sounds. Abdominal:      General: Abdomen is flat. Palpations: Abdomen is soft. Tenderness: There is no abdominal tenderness. Musculoskeletal:         General: Normal range of motion. Cervical back: Normal range of motion and neck supple. Skin:     General: Skin is warm and dry. Neurological:      General: No focal deficit present. Mental Status: He is alert and oriented to person, place, and time. Mental status is at baseline.    Psychiatric:         Mood and Affect: Mood normal.         Behavior: Behavior normal.         Scheduled Meds:   sodium chloride flush  5-40 mL IntraVENous 2 times per day    enoxaparin  40 mg SubCUTAneous Daily    FLUoxetine  80 mg Oral Daily    insulin glargine  30 Units SubCUTAneous Nightly    losartan  25 mg Oral Daily    metoprolol succinate  25 mg Oral Daily    insulin lispro  0-8 Units SubCUTAneous TID WC    insulin lispro  0-4 Units SubCUTAneous Nightly    pantoprazole (PROTONIX) 40 mg injection  40 mg IntraVENous Daily    ciprofloxacin  400 mg IntraVENous Q12H    metroNIDAZOLE  500 mg IntraVENous Q8H     ContinuousInfusions:   sodium chloride 20 mL/hr at 09/02/22 0837    dextrose       PRN Meds:sodium chloride flush, sodium chloride, ondansetron **OR** ondansetron, polyethylene glycol, acetaminophen **OR** acetaminophen, glucose, dextrose bolus **OR** dextrose bolus, glucagon (rDNA), dextrose      Labs/Imaging Results:   Lab Results   Component Value Date    WBC 13.2 (H) 09/01/2022    HGB 10.8 (L) 09/01/2022    HCT 33.8 (L) 09/01/2022    MCV 90.9 09/01/2022     09/01/2022     Lab Results   Component Value Date     (L) 09/01/2022    K 4.0 09/01/2022     09/01/2022    CO2 25 09/01/2022    BUN 10 09/01/2022    CREATININE 0.9 09/01/2022    GLUCOSE 200 (H) 09/01/2022    CALCIUM 7.2 (L) 09/01/2022    PROT 9.0 (H) 08/31/2022    LABALBU 2.8 (L) 08/31/2022    BILITOT 1.1 (H) 08/31/2022    ALKPHOS 125 08/31/2022    AST 77 (H) 08/31/2022    ALT 20 08/31/2022    LABGLOM >60 09/01/2022    GFRAA >60 09/01/2022    AGRATIO 1.4 03/12/2020    GLOB 3.2 03/12/2020       Assessment:     Meghan Patton is a 38 yo male with pneumatosis of the cecum/right colon    Plan:     - abdomen remains benign  - CBC ordered for this morning  - tolerating clear liquids. Will advance to fulls.  May ADAT to soft regular for lunch  - Will follow    Electronically signed by RAFAEL Amaral CNP on 9/2/2022 at 10:03 AM

## 2022-09-02 NOTE — CONSULTS
[facility] Continence Nurse  Consult Note       Sean Cortez  AGE: 39 y.o. GENDER: male  : 1980  TODAY'S DATE:  2022    Subjective:     Reason for  Evaluation and Assessment: wound care eval rt and lt foot wounds.        Sean Cortez is a 39 y.o. male referred by:   [x] Physician  [] Nursing  [] Other:     Wound Identification:  Wound Type: diabetic, pressure, and non-healing surgical  Contributing Factors: diabetes and chronic pressure        PAST MEDICAL HISTORY        Diagnosis Date    Abscess of left foot 2022    Anemia associated with acute blood loss 2022    Callus of foot     Right foot - see's Dr. Isra Pastor    Cellulitis of left foot 2022    Diabetic ulcer of left midfoot associated with type 2 diabetes mellitus, with muscle involvement without evidence of necrosis ([address] 2022    Diabetic ulcer of left midfoot associated with type 2 diabetes mellitus, with necrosis of muscle ([address] 2021    Diabetic ulcer of right midfoot associated with type 2 diabetes mellitus, with fat layer exposed ([address] 2020    Dizziness     positional    Essential hypertension     Follows with PCP    Hyperlipidemia     Lumbar radiculopathy     Septic embolism ([address] 2020    Subacute osteomyelitis of left foot ([address] 2022       PAST SURGICAL HISTORY    Past Surgical History:   Procedure Laterality Date    ACHILLES TENDON SURGERY Right 2021    RIGHT ACHILLES TENDON LENGTHENING REPAIR performed by Nunu Caruso DPM at [address] [place] [address]  2018    [address] [place] [address] 51    DENTAL SURGERY      teeth extractions -half per patient    HERNIA REPAIR Bilateral 2020    BIALTERAL HERNIA INGUINAL REPAIR performed by Landon Knight MD at [facility] 2018    FL OFFICE/OUTPT VISIT,PROCEDURE ONLY Left 2018    L5-S1 HEMILAMINECTOMY, REMOVAL OF DISC LEFT SIDE performed by Lilliana Haney MD at [address] Right 2021    RIGHT TRANSMETATARSAL TOE AMPUTATION performed by Joy Sumner DPM at [facility] OR    TOE AMPUTATION Left 4/23/2022    LEFT RAY GREAT TOE AMPUTATION performed by Mary Mcclain MD at [address] [place] [address]    Family History   Problem Relation Age of Onset    Heart Disease Father     Diabetes Father     Diabetes Mother     Heart Disease Mother     Other Mother     Kidney Disease Mother     Heart Attack Mother        SOCIAL HISTORY    Social History     Tobacco Use    Smoking status: Never    Smokeless tobacco: Never   Vaping Use    Vaping Use: Never used   Substance Use Topics    Alcohol use: Yes     Comment: \"couple beers a night\"    Drug use: No       ALLERGIES    No Known Allergies    MEDICATIONS    No current facility-administered medications on file prior to encounter. Current Outpatient Medications on File Prior to Encounter   Medication Sig Dispense Refill    metoclopramide (REGLAN) 10 MG tablet Take 1 tablet by mouth 4 times daily (before meals and nightly) 120 tablet 3    ondansetron (ZOFRAN-ODT) 4 MG disintegrating tablet Take 1 tablet by mouth every 8 hours as needed for Nausea or Vomiting 14 tablet 0    sucralfate (CARAFATE) 1 GM/10ML suspension Take 10 mLs by mouth 4 times daily for 7 days 414 mL 0    glyBURIDE (DIABETA) 5 MG tablet Take 2 tablets by mouth in the morning and at bedtime 120 tablet 1    Dulaglutide 1.5 MG/0.5ML SOPN Inject 1.5 mg into the skin once a week 5 pen 3    metoprolol succinate (TOPROL XL) 25 MG extended release tablet Take 1 tablet by mouth in the morning. 90 tablet 1    losartan (COZAAR) 25 MG tablet Take 1 tablet by mouth in the morning. 90 tablet 1    [DISCONTINUED] metFORMIN (GLUCOPHAGE) 500 MG tablet Take 2 tablets by mouth in the morning and 2 tablets in the evening. Take with meals.  Indications: Start tomorrow 03/14. 360 tablet 1    [DISCONTINUED] omeprazole (PRILOSEC) 20 MG delayed release capsule Take 1 capsule by mouth once daily in the morning 90 capsule 1    FLUoxetine (PROZAC) 40 MG capsule Take 2 capsules by mouth in the morning. 30 capsule 3    insulin glargine (LANTUS SOLOSTAR) 100 UNIT/ML injection pen Inject 30 Units into the skin nightly 5 pen 2    collagenase 250 UNIT/GM ointment Per instructions DAILY (route: topical)      Alcohol Swabs PADS       pioglitazone (ACTOS) 30 MG tablet Take 1 tablet by mouth daily 90 tablet 1    atorvastatin (LIPITOR) 40 MG tablet Take 1 tablet by mouth nightly 90 tablet 1    blood glucose monitor strips Test 2 times a day & as needed for symptoms of irregular blood glucose. Dispense sufficient amount for indicated testing frequency plus additional to accommodate PRN testing needs. 100 strip 0    blood glucose monitor kit and supplies Dispense sufficient amount for indicated testing frequency plus additional to accommodate PRN testing needs. Dispense all needed supplies to include: monitor, strips, lancing device, lancets, control solutions, alcohol swabs.  1 kit 0    FreeStyle Lancets MISC 1 each by Does not apply route daily 100 each 3    [DISCONTINUED] acetaminophen (TYLENOL) 325 MG tablet Take 2 tablets by mouth every 4 hours as needed for Pain or Fever 120 tablet 3         Objective:      BP (!) 161/91   Pulse 91   Temp 98.7 °F (37.1 °C) (Oral)   Resp 18   Ht 6' (1.829 m)   Wt 184 lb 11.9 oz (83.8 kg)   SpO2 96%   BMI 25.06 kg/m²   Heraclio Risk Score: Heraclio Scale Score: 17    LABS    CBC:   Lab Results   Component Value Date/Time    WBC 13.2 09/01/2022 06:00 AM    RBC 3.72 09/01/2022 06:00 AM    HGB 10.8 09/01/2022 06:00 AM    HCT 33.8 09/01/2022 06:00 AM    MCV 90.9 09/01/2022 06:00 AM    MCH 29.0 09/01/2022 06:00 AM    MCHC 32.0 09/01/2022 06:00 AM    RDW 15.2 09/01/2022 06:00 AM     09/01/2022 06:00 AM    MPV 8.4 09/01/2022 06:00 AM     CMP:    Lab Results   Component Value Date/Time     09/01/2022 06:00 AM    K 4.0 09/01/2022 06:00 AM    K 3.8 03/14/2018 06:01 AM    CL 100 09/01/2022 06:00 AM    CO2 25 09/01/2022 06:00 AM    BUN 10 09/01/2022 06:00 AM    CREATININE 0.9 09/01/2022 06:00 AM    GFRAA >60 09/01/2022 06:00 AM    AGRATIO 1.4 03/12/2020 08:26 AM    LABGLOM >60 09/01/2022 06:00 AM    GLUCOSE 200 09/01/2022 06:00 AM    PROT 9.0 08/31/2022 09:30 PM    LABALBU 2.8 08/31/2022 09:30 PM    CALCIUM 7.2 09/01/2022 06:00 AM    BILITOT 1.1 08/31/2022 09:30 PM    ALKPHOS 125 08/31/2022 09:30 PM    AST 77 08/31/2022 09:30 PM    ALT 20 08/31/2022 09:30 PM     Albumin:    Lab Results   Component Value Date/Time    LABALBU 2.8 08/31/2022 09:30 PM     PT/INR:    Lab Results   Component Value Date/Time    PROTIME 11.5 05/20/2022 11:04 PM    INR 0.89 05/20/2022 11:04 PM     HgBA1c:    Lab Results   Component Value Date/Time    LABA1C 10.5 07/28/2022 10:45 AM         Assessment:     Patient Active Problem List   Diagnosis    Unstable angina (HCC)    Lumbar back pain with radiculopathy affecting left lower extremity    S/P lumbar laminectomy    Type 2 diabetes mellitus with diabetic neuropathy, without long-term current use of insulin (HCC)    Essential hypertension    Gastroesophageal reflux disease without esophagitis    Chest pain    Moderate nonproliferative diabetic retinopathy of both eyes without macular edema associated with type 2 diabetes mellitus (HCC)    Acute osteomyelitis (HCC)    Acute osteomyelitis of foot (HCC)    Abscess of left foot    Cellulitis of left foot    Subacute osteomyelitis of left foot (HCC)    Anemia associated with acute blood loss    Anxiety attack    WD-Diabetic ulcer of left midfoot associated with type 2 diabetes mellitus, with fat layer exposed ([address]    WD-Diabetic ulcer of right midfoot associated with type 2 diabetes mellitus, with fat layer exposed ([address]    WD-Partial nontraumatic amputation of foot, left (HCC)    Severe episode of recurrent major depressive disorder, without psychotic features ([address]    Insomnia    Intractable vomiting with nausea Intractable nausea and vomiting       Measurements:  Negative Pressure Wound Therapy (Active)   Number of days: 100       Incision 04/17/18 Back (Active)   Number of days: 1599       Wound 06/13/20 Tibial Right pt states reddened and rash like after a sunburn 6/11/2020 (Active)   Number of days: 810       Wound 05/11/22 #1 (onset 2 weeks) Left Medial Foot Amp Site (Active)   Wound Image   09/02/22 1011   Wound Etiology Non-Healing Surgical 09/02/22 1011   Dressing Status New dressing applied 09/02/22 1011   Wound Cleansed Cleansed with saline 09/02/22 1011   Dressing/Treatment Collagen;Hydrofiber Ag;ABD;Roll gauze 09/02/22 1011   Offloading for Diabetic Foot Ulcers Diabetic shoes/inserts 08/30/22 1429   Wound Length (cm) 3.3 cm 09/02/22 1011   Wound Width (cm) 0.8 cm 09/02/22 1011   Wound Depth (cm) 0.4 cm 09/02/22 1011   Wound Surface Area (cm^2) 2.64 cm^2 09/02/22 1011   Change in Wound Size % (l*w) 94.87 09/02/22 1011   Wound Volume (cm^3) 1. 056 cm^3 09/02/22 1011   Wound Healing % 96 09/02/22 1011   Post-Procedure Length (cm) 4 cm 08/30/22 1343   Post-Procedure Width (cm) 1 cm 08/30/22 1343   Post-Procedure Depth (cm) 0.6 cm 08/30/22 1343   Post-Procedure Surface Area (cm^2) 4 cm^2 08/30/22 1343   Post-Procedure Volume (cm^3) 2.4 cm^3 08/30/22 1343   Distance Tunneling (cm) 0 cm 09/02/22 1011   Tunneling Position ___ O'Clock 0 09/02/22 1011   Undermining Starts ___ O'Clock 6 09/02/22 1011   Undermining Ends___ O'Clock 3 09/02/22 1011   Undermining Maxium Distance (cm) 0.7 09/02/22 1011   Wound Assessment Pink/red 09/02/22 1011   Drainage Amount Large 09/02/22 1011   Drainage Description Serosanguinous; Yellow 09/02/22 1011   Odor Malodorous/putrid 09/02/22 1011   Rosalie-wound Assessment Maceration 09/02/22 1011   Margins Defined edges 09/02/22 1011   Wound Thickness Description not for Pressure Injury Full thickness 09/02/22 1011   Number of days: 114       Wound 05/11/22 #2 (onset unknown) Left Plantar (Active) Wound Image   09/02/22 1011   Wound Etiology Diabetic 09/02/22 1011   Dressing Status New dressing applied 09/02/22 1011   Wound Cleansed Cleansed with saline 09/02/22 1011   Dressing/Treatment Collagen;Hydrofiber Ag;ABD;Roll gauze 09/02/22 1011   Offloading for Diabetic Foot Ulcers Diabetic shoes/inserts 08/30/22 1429   Wound Length (cm) 1.5 cm 09/02/22 1011   Wound Width (cm) 1.1 cm 09/02/22 1011   Wound Depth (cm) 0.5 cm 09/02/22 1011   Wound Surface Area (cm^2) 1.65 cm^2 09/02/22 1011   Change in Wound Size % (l*w) -3.13 09/02/22 1011   Wound Volume (cm^3) 0.825 cm^3 09/02/22 1011   Wound Healing % -158 09/02/22 1011   Post-Procedure Length (cm) 1 cm 08/30/22 1343   Post-Procedure Width (cm) 1.5 cm 08/30/22 1343   Post-Procedure Depth (cm) 1 cm 08/30/22 1343   Post-Procedure Surface Area (cm^2) 1.5 cm^2 08/30/22 1343   Post-Procedure Volume (cm^3) 1.5 cm^3 08/30/22 1343   Distance Tunneling (cm) 0 cm 09/02/22 1011   Tunneling Position ___ O'Clock 0 09/02/22 1011   Undermining Starts ___ O'Clock 0 09/02/22 1011   Undermining Ends___ O'Clock 0 09/02/22 1011   Undermining Maxium Distance (cm) 0 09/02/22 1011   Wound Assessment Pink/red 09/02/22 1011   Drainage Amount Large 09/02/22 1011   Drainage Description Serosanguinous 09/02/22 1011   Odor Malodorous/putrid 09/02/22 1011   Rosalie-wound Assessment Maceration 09/02/22 1011   Margins Defined edges 09/02/22 1011   Wound Thickness Description not for Pressure Injury Full thickness 09/02/22 1011   Number of days: 114       Wound 05/11/22 #3 (onset unknown) Right Plantar (Active)   Wound Image   09/02/22 1011   Wound Etiology Diabetic 09/02/22 1011   Dressing Status New dressing applied 09/02/22 1011   Wound Cleansed Cleansed with saline 09/02/22 1011   Dressing/Treatment Hydrofiber Ag;ABD;Roll gauze 09/02/22 1011   Offloading for Diabetic Foot Ulcers Diabetic shoes/inserts 08/30/22 1429   Wound Length (cm) 0.5 cm 09/02/22 1011   Wound Width (cm) 0.4 cm 09/02/22 1011 Wound Depth (cm) 1.5 cm 09/02/22 1011   Wound Surface Area (cm^2) 0.2 cm^2 09/02/22 1011   Change in Wound Size % (l*w) 85.19 09/02/22 1011   Wound Volume (cm^3) 0.3 cm^3 09/02/22 1011   Wound Healing % 44 09/02/22 1011   Post-Procedure Length (cm) 1 cm 08/30/22 1343   Post-Procedure Width (cm) 0.3 cm 08/30/22 1343   Post-Procedure Depth (cm) 0.9 cm 08/30/22 1343   Post-Procedure Surface Area (cm^2) 0.3 cm^2 08/30/22 1343   Post-Procedure Volume (cm^3) 0.27 cm^3 08/30/22 1343   Distance Tunneling (cm) 0 cm 09/02/22 1011   Tunneling Position ___ O'Clock 0 09/02/22 1011   Undermining Starts ___ O'Clock 0 09/02/22 1011   Undermining Ends___ O'Clock 0 09/02/22 1011   Undermining Maxium Distance (cm) 0 09/02/22 1011   Wound Assessment Pink/red 09/02/22 1011   Drainage Amount Large 09/02/22 1011   Drainage Description Serosanguinous; Yellow 09/02/22 1011   Odor Malodorous/putrid 09/02/22 1011   Rosalie-wound Assessment Maceration 09/02/22 1011   Margins Defined edges 09/02/22 1011   Wound Thickness Description not for Pressure Injury Full thickness 09/02/22 1011   Number of days: 114       Response to treatment:  Well tolerated by patient. Pain Assessment:  Severity:  none  Quality of pain:   Wound Pain Timing/Severity:   Premedicated: no    Plan:     Plan of Care: Wound 05/11/22 #1 (onset 2 weeks) Left Medial Foot Amp Site-Dressing/Treatment: Collagen, Hydrofiber Ag, ABD, Roll gauze  Wound 05/11/22 #3 (onset unknown) Right Plantar-Dressing/Treatment: Hydrofiber Ag, ABD, Roll gauze  Wound 05/11/22 #2 (onset unknown) Left Plantar-Dressing/Treatment: Collagen, Hydrofiber Ag, ABD, Roll gauze    Patient in bed agreeable to wound care eval for chronic diabetic/pressure wounds to rt plantar and lt plantar/lt amp site. Wound care saw patient on 8/29/22 this week from previous admission was discharged and readmitted. Pt was at the wound clinic 8/30/22 has dressings in place ordered to be changed today.  Rt foot dressing was draining through pt's sock. Removed dressings. Washed feet with soap and water and rinsed wounds with NS. Rt foot very macerated from drainage and strong odor to wounds. Rt foot packed with Aquacel Ag. Left plantar/left amp site applied collagen and Aquacel ag with dressings as documented per flow sheet. Pt is at mild risk for skin breakdown AEB tanya. Follow tanya orders. Specialty Bed Required : yes  [] Low Air Loss   [x] Pressure Redistribution  [] Fluid Immersion  [] Bariatric  [] Total Pressure Relief  [] Other:     Discharge Plan:  Placement for patient upon discharge: home  Hospice Care: no  Patient appropriate for Outpatient [address]: yes to continue after discharge. Patient/Caregiver Teaching:  Level of patient/caregiver understanding able to: pt voiced understanding. Electronically signed by Smith Parra RN, on 9/2/2022 at 11:28 AM

## 2022-09-02 NOTE — ED PROVIDER NOTES
Triage Chief Complaint:   Wound Check (Bilateral foot ulcers) and Emesis      [place]:  Dorothy Crowell is a 39 y.o. male that presents to the emergency department with concern for infection in his feet. Patient was just discharged from this hospital this afternoon. He was admitted for pneumatosis coli. He was started on Cipro and Flagyl but has not filled these prescriptions yet. He states he has chronic wounds on his feet but his right foot wound is gotten much worse and red and swollen since he left the hospital.  He is a diabetic. He has had previous amputations of his toes on his both feet. He states wound care did come to see him before he left the hospital and dressed his wounds. He denies fever or chills. He does still complain of some nausea.     Past Medical History:   Diagnosis Date    Abscess of left foot 4/22/2022    Anemia associated with acute blood loss 4/26/2022    Callus of foot     Right foot - see's Dr. Opal Stoll    Cellulitis of left foot 4/22/2022    Diabetic ulcer of left midfoot associated with type 2 diabetes mellitus, with muscle involvement without evidence of necrosis ([address] 4/26/2022    Diabetic ulcer of left midfoot associated with type 2 diabetes mellitus, with necrosis of muscle ([address] 6/7/2021    Diabetic ulcer of right midfoot associated with type 2 diabetes mellitus, with fat layer exposed ([address] 8/11/2020    Dizziness     positional    Essential hypertension     Follows with PCP    Hyperlipidemia     Lumbar radiculopathy     Septic embolism ([address] 6/13/2020    Subacute osteomyelitis of left foot ([address] 4/22/2022     Past Surgical History:   Procedure Laterality Date    ACHILLES TENDON SURGERY Right 1/8/2021    RIGHT ACHILLES TENDON LENGTHENING REPAIR performed by Trino Marquez DPM at [facility]  03/2018    [address] [place] [address]    DENTAL SURGERY      teeth extractions -half per patient    HERNIA REPAIR Bilateral 5/27/2020    BIALTERAL HERNIA INGUINAL REPAIR performed by Karthik Guerrero MD at [facility] 30 2018    SC OFFICE/OUTPT VISIT,PROCEDURE ONLY Left 4/17/2018    L5-S1 HEMILAMINECTOMY, REMOVAL OF [place] LEFT SIDE performed by Krystle Barrera MD at [facility] [address] Right 1/8/2021    RIGHT TRANSMETATARSAL TOE AMPUTATION performed by Wong Varner DPM at [facility] [address] Left 4/23/2022    LEFT RAY GREAT TOE AMPUTATION performed by Yasir Russell MD at [facility]       Family History   Problem Relation Age of Onset    Heart Disease Father     Diabetes Father     Diabetes Mother     Heart Disease Mother     Other Mother     Kidney Disease Mother     Heart Attack Mother      Social History     Socioeconomic History    Marital status: Single     Spouse name: Not on file    Number of children: Not on file    Years of education: Not on file    Highest education level: Not on file   Occupational History    Not on file   Tobacco Use    Smoking status: Never    Smokeless tobacco: Never   Vaping Use    Vaping Use: Never used   Substance and Sexual Activity    Alcohol use: Yes     Comment: \"couple beers a night\"    Drug use: No    Sexual activity: Yes     Partners: Female   Other Topics Concern    Not on file   Social History Narrative    Not on file     Social Determinants of Health     Financial Resource Strain: Medium Risk    Difficulty of Paying Living Expenses: Somewhat hard   Food Insecurity: Food Insecurity Present    Worried About [facility] in the Last Year: Sometimes true    Ran Out of Food in the Last Year: Sometimes true   Transportation Needs: No Transportation Needs    Lack of Transportation (Medical): No    Lack of Transportation (Non-Medical):  No   Physical Activity: Inactive    Days of Exercise per Week: 0 days    Minutes of Exercise per Session: 0 min   Stress: Stress Concern Present    Feeling of Stress : Rather much   Social Connections: Unknown    Frequency of Communication with Friends and Family: Once a week    Frequency of Social Gatherings with Friends and Family: Not on file    Attends Hindu Services: Never    Active Member of Clubs or Organizations: Not on file    Attends Club or Organization Meetings: Never    Marital Status: Never    Intimate Partner Violence: Not on file   Housing Stability: [address] to Pay for Housing in the Last Year: No    Number of [place] in the Last Year: 1    Unstable Housing in the Last Year: No     No current facility-administered medications for this encounter. Current Outpatient Medications   Medication Sig Dispense Refill    metroNIDAZOLE (FLAGYL) 500 MG tablet Take 1 tablet by mouth 3 times daily for 28 days 84 tablet 0    pantoprazole (PROTONIX) 20 MG tablet Take 1 tablet by mouth every morning (before breakfast) 30 tablet 0    metFORMIN (GLUCOPHAGE) 500 MG tablet Take 2 tablets by mouth 2 times daily (with meals) Indications: Start tomorrow 03/14 360 tablet 1    ondansetron (ZOFRAN-ODT) 4 MG disintegrating tablet Take 1 tablet by mouth every 8 hours as needed for Nausea or Vomiting 14 tablet 0    sucralfate (CARAFATE) 1 GM/10ML suspension Take 10 mLs by mouth 4 times daily for 7 days 414 mL 0    glyBURIDE (DIABETA) 5 MG tablet Take 2 tablets by mouth in the morning and at bedtime 120 tablet 1    Dulaglutide 1.5 MG/0.5ML SOPN Inject 1.5 mg into the skin once a week 5 pen 3    metoprolol succinate (TOPROL XL) 25 MG extended release tablet Take 1 tablet by mouth in the morning. 90 tablet 1    losartan (COZAAR) 25 MG tablet Take 1 tablet by mouth in the morning. 90 tablet 1    FLUoxetine (PROZAC) 40 MG capsule Take 2 capsules by mouth in the morning.  30 capsule 3    insulin glargine (LANTUS SOLOSTAR) 100 UNIT/ML injection pen Inject 30 Units into the skin nightly 5 pen 2    collagenase 250 UNIT/GM ointment Per instructions DAILY (route: topical)      Alcohol Swabs PADS       pioglitazone (ACTOS) 30 MG tablet Take 1 tablet by mouth daily 90 tablet 1    atorvastatin (LIPITOR) 40 MG tablet Take 1 tablet by mouth nightly 90 tablet 1    blood glucose monitor strips Test 2 times a day & as needed for symptoms of irregular blood glucose. Dispense sufficient amount for indicated testing frequency plus additional to accommodate PRN testing needs. 100 strip 0    blood glucose monitor kit and supplies Dispense sufficient amount for indicated testing frequency plus additional to accommodate PRN testing needs. Dispense all needed supplies to include: monitor, strips, lancing device, lancets, control solutions, alcohol swabs. 1 kit 0    FreeStyle Lancets MISC 1 each by Does not apply route daily 100 each 3     No Known Allergies  Nursing Notes Reviewed    ROS:  At least 10 systems reviewed and otherwise negative except as in the [address] Physical Exam:  ED Triage Vitals [09/02/22 1758]   Enc Vitals Group      BP (!) 157/83      Heart Rate 100      Resp 18      Temp 98.2 °F (36.8 °C)      Temp src       SpO2 99 %      Weight       Height       Head Circumference       Peak Flow       Pain Score       Pain Loc       Pain Edu? Excl. in [address]? My pulse oximetry interpretation is which is within the normal range    GENERAL APPEARANCE: Awake and alert. Cooperative. No acute distress. HEAD:  Atraumatic. EYES: EOM's grossly intact. ENT: Mucous membranes are moist.  No trismus. NECK:  Trachea midline. HEART: RRR. Radial pulses 2+. LUNGS: Respirations unlabored. CTAB  ABDOMEN: Soft. Non-tender. No guarding or rebound. Erythema, redness and pitting edema to his right foot and ankle. Open wound on plantar aspect is slightly draining. EXTREMITIES:           SKIN: Warm and dry. NEUROLOGICAL: No gross facial drooping. Moves all 4 extremities spontaneously. PSYCHIATRIC: Normal mood.     I have reviewed and interpreted all of the currently available lab results from this visit (if applicable):  Results for orders placed or performed during the hospital encounter of 09/02/22   CBC with Auto Differential   Result Value Ref Range    WBC 13.9 (H) 4.0 - 10.5 K/CU MM    RBC 4.24 (L) 4.6 - 6.2 M/CU MM    Hemoglobin 12.1 (L) 13.5 - 18.0 GM/DL    Hematocrit 38.7 (L) 42 - 52 %    MCV 91.3 78 - 100 FL    MCH 28.5 27 - 31 PG    MCHC 31.3 (L) 32.0 - 36.0 %    RDW 15.3 (H) 11.7 - 14.9 %    Platelets 401 201 - 676 K/CU MM    MPV 9.6 7.5 - 11.1 FL    Differential Type AUTOMATED DIFFERENTIAL     Segs Relative 83.7 (H) 36 - 66 %    Lymphocytes % 7.1 (L) 24 - 44 %    Monocytes % 8.1 (H) 0 - 4 %    Eosinophils % 0.3 0 - 3 %    Basophils % 0.2 0 - 1 %    Segs Absolute 11.6 K/CU MM    Lymphocytes Absolute 1.0 K/CU MM    Monocytes Absolute 1.1 K/CU MM    Eosinophils Absolute 0.0 K/CU MM    Basophils Absolute 0.0 K/CU MM    Nucleated RBC % 0.0 %    Total Nucleated RBC 0.0 K/CU MM    Total Immature Neutrophil 0.08 K/CU MM    Immature Neutrophil % 0.6 (H) 0 - 0.43 %   CMP   Result Value Ref Range    Sodium 132 (L) 135 - 145 MMOL/L    Potassium 3.9 3.5 - 5.1 MMOL/L    Chloride 95 (L) 99 - 110 mMol/L    CO2 24 21 - 32 MMOL/L    BUN 6 6 - 23 MG/DL    Creatinine 1.0 0.9 - 1.3 MG/DL    Glucose 143 (H) 70 - 99 MG/DL    Calcium 8.6 8.3 - 10.6 MG/DL    Albumin 2.9 (L) 3.4 - 5.0 GM/DL    Total Protein 8.7 (H) 6.4 - 8.2 GM/DL    Total Bilirubin 0.7 0.0 - 1.0 MG/DL    ALT 17 10 - 40 U/L    AST 35 15 - 37 IU/L    Alkaline Phosphatase 99 40 - 129 IU/L    GFR Non-African American >60 >60 mL/min/1.73m2    GFR African American >60 >60 mL/min/1.73m2    Anion Gap 13 4 - 16   Lactic Acid   Result Value Ref Range    Lactate 1.4 0.4 - 2.0 mMOL/L   Sedimentation Rate   Result Value Ref Range    Sed Rate >130 (H) 0 - 15 MM/HR          EKG: (All EKG's are interpreted by myself in the absence of a cardiologist)      MDM:  His white blood cell count is now 13.9. Prior to leaving the hospital today it was within normal limits. His lactic acid is normal.  His chemistry is unremarkable.   His ESR is greater than 30. I did do an x-ray that is concerning for a pyogenic infection and possible gas extending up into the tibia. I discussed with Dr. Kiki Garsia from general surgery. She recommended getting a CT of his leg with contrast, which is currently pending at 2100. I was able to review the image that wound care nursing took around 10 AM this morning. Fortunately his foot now looks more I did a mess, erythematous and there is drainage out of the wound on the plantar aspect of the right foot. After CT is resulted Dr. Kiki Garsia has taken the patient to the OR. 35 minutes of critical care time was spent with this patient. This excludes time spent on separate billable procedures    Clinical Impression:  1. Necrotizing fasciitis ([facility]        Disposition Vitals:  [unfilled], [unfilled], [unfilled], [unfilled]    Disposition referral (if applicable):  No follow-up provider specified.     Disposition medications (if applicable):  New Prescriptions    No medications on file         (Please note that portions of this note may have been completed with a voice recognition program. Efforts were made to edit the dictations but occasionally words are mis-transcribed.)    MD Kolton Pham MD  09/02/22 [address] [place] [address] MD Yoan  09/02/22 1978

## 2022-09-02 NOTE — DISCHARGE SUMMARY
V2.0  Discharge Summary    Name:  Anny Fraser /Age/Sex: 1980 (14 y.o. male)   Admit Date: 2022  Discharge Date: 22    MRN & CSN:  [id number] & [id number] Encounter Date and Time 22 11:41 AM EDT    Attending:  Nichol Marques MD Discharging Provider: Nichol Marques MD       Discharge Diagnosess:     Pneumatosis coli  Abd pain  Nausea  Vomiting  Diabetic foot wounds present on admission  Hyponatremia        Admission HPI, hospital course, and consultants:     Admission HPI:  Hui Epperson is a 39 y.o. male with hypertension, diabetes mellitus type 2, complicated by diabetic neuropathy, diabetic foot ulcer requiring amputation, hyperlipidemia, anxiety/depression presented to ED with complaints of nausea, vomiting, abdominal pain. Patient was admitted to the hospital -2022 with similar complaints. Patient reported feeling better at the time of discharge and was able to tolerate p.o. intake. Patient complained of bilateral lower quadrant abdominal pain, constant, 10/10 intensity, denied any aggravating or relieving factors. Patient denied any fever or chills. Patient denied any urinary complaints, denied any constipation or diarrhea. At presentation patient was noted to have /83, , RR 16, temp 99.7, saturating 97% on room air. Lab work was significant for sodium 127, potassium 6.2 (sample hemolyzed-repeat potassium 3.9), chloride 92, lactic acid 2.2. AST 77, total bilirubin 1.1, lipase 20, random glucose 233, WBC 12.9, hemoglobin 13. VBG-pH 7.39, PCO2 41, PO2 50, HCO3 24.8. CT abdomen/pelvis-pneumatosis along the wall of cecum to ascending colon without wall thickening or surrounding pericolonic inflammation. Given CT abdomen/pelvis findings general surgery was consulted. Patient received NS bolus, Phenergan, Zofran, morphine in ED. [facility] course:  Patient treated with cipro/flagyl and felt much better by the next day. Abd pain, nausea resolved.  Patient tolerating diet. Discharged on prolonged course of po Flagyl with instructions to f/u with GI outpatient for further management of pneumatosis coli. The patient expressed appropriate understanding of, and agreement with the discharge recommendations, medications, and plan. Consults this admission:  [address] TO HOSPITALIST    Discharge Instructions: Follow up appointments: GI  Primary care physician: Elmo Cooks, APRN - CNP within 2 weeks  Diet: regular diet   Activity: activity as tolerated  Disposition: Discharged to:   [x]Home, [][facility], []SNF, []Acute Rehab, []Hospice   Condition on discharge: Stable  Labs and Tests to be Followed up as an outpatient by PCP or Specialist: none    Discharge Medications:        Medication List        START taking these medications      metroNIDAZOLE 500 MG tablet  Commonly known as: FLAGYL  Take 1 tablet by mouth 3 times daily     pantoprazole 40 MG tablet  Commonly known as: PROTONIX  Take 1 tablet by mouth every morning (before breakfast)            CONTINUE taking these medications      Alcohol Swabs Pads     atorvastatin 40 MG tablet  Commonly known as: LIPITOR  Take 1 tablet by mouth nightly     blood glucose monitor kit and supplies  Dispense sufficient amount for indicated testing frequency plus additional to accommodate PRN testing needs. Dispense all needed supplies to include: monitor, strips, lancing device, lancets, control solutions, alcohol swabs. blood glucose test strips  Test 2 times a day & as needed for symptoms of irregular blood glucose. Dispense sufficient amount for indicated testing frequency plus additional to accommodate PRN testing needs. collagenase 250 UNIT/GM ointment     Dulaglutide 1.5 MG/0.5ML Sopn  Inject 1.5 mg into the skin once a week     FLUoxetine 40 MG capsule  Commonly known as: PROzac  Take 2 capsules by mouth in the morning.      FreeStyle Lancets Misc  1 each by Does not apply route daily glyBURIDE 5 MG tablet  Commonly known as: DIABETA  Take 2 tablets by mouth in the morning and at bedtime     Lantus SoloStar 100 UNIT/ML injection pen  Generic drug: insulin glargine  Inject 30 Units into the skin nightly     losartan 25 MG tablet  Commonly known as: COZAAR  Take 1 tablet by mouth in the morning. metoprolol succinate 25 MG extended release tablet  Commonly known as: TOPROL XL  Take 1 tablet by mouth in the morning. ondansetron 4 MG disintegrating tablet  Commonly known as: ZOFRAN-ODT  Take 1 tablet by mouth every 8 hours as needed for Nausea or Vomiting     pioglitazone 30 MG tablet  Commonly known as: Actos  Take 1 tablet by mouth daily     sucralfate 1 GM/10ML suspension  Commonly known as: Carafate  Take 10 mLs by mouth 4 times daily for 7 days            STOP taking these medications      acetaminophen 325 MG tablet  Commonly known as: TYLENOL     omeprazole 20 MG delayed release capsule  Commonly known as: PRILOSEC            ASK your doctor about these medications      metFORMIN 500 MG tablet  Commonly known as: GLUCOPHAGE  Take 2 tablets by mouth in the morning and 2 tablets in the evening. Take with meals. Indications: Start tomorrow 03/14. metoclopramide 10 MG tablet  Commonly known as: REGLAN  Take 1 tablet by mouth 4 times daily (before meals and nightly)               Where to Get Your Medications        These medications were sent to [address] [place] [address]      Phone: [phone]   metroNIDAZOLE 500 MG tablet  pantoprazole 40 MG tablet        Objective Findings at Discharge:   BP (!) 161/91   Pulse 91   Temp 98.7 °F (37.1 °C) (Oral)   Resp 18   Ht 6' (1.829 m)   Wt 184 lb 11.9 oz (83.8 kg)   SpO2 96%   BMI 25.06 kg/m²       Physical Exam:   General: NAD  Eyes: EOMI  ENT: neck supple  Cardiovascular: Regular rate.   Respiratory: dilated. Stomach is under distended and gastritis is not excluded. The appendix is identified and no appendicitis. There is some pneumatosis at the cecum and ascending colon with fluid in the lumen. .  The colonic wall is not thickened and there is no focal surrounding inflammation. Transverse and distal colon are collapsed without wall thickening or pneumatosis. There is no significant rectal fecal bolus. There is no portal venous gas or pneumoperitoneum. Pelvis: Urinary bladder wall is not thickened. Prostate is not enlarged. No free fluid in the pelvis. Peritoneum/Retroperitoneum: No abdominal aortic aneurysm or retroperitoneal hematoma. Several borderline lymph nodes are present along the periaortic and pericaval chain are mildly increased from the old exam.. Bones/Soft Tissues: No acute fracture or destruction at the included bones. Old deformity of upper endplate of L1. No fluid collection in the abdominal wall. There is fluid in the lumen at the cecum and ascending colon. There is pneumatosis along the wall of the cecum to ascending colon without wall thickening or surrounding pericolonic inflammation. No appendicitis. The remaining colon is collapsed. The pneumatosis and fluid are nonspecific but may relate to a colitis and diarrhea. There is no portal venous gas or pneumoperitoneum. Mildly increased lymph nodes in the periaortic and pericaval chain compared with 06/12/2021. These may be post infectious/inflammatory. CT ABDOMEN PELVIS W IV CONTRAST Additional Contrast? None    Result Date: 8/28/2022  EXAMINATION: CT OF THE ABDOMEN AND PELVIS WITH CONTRAST 8/28/2022 3:28 am TECHNIQUE: CT of the abdomen and pelvis was performed with the administration of intravenous contrast. Multiplanar reformatted images are provided for review.  Automated exposure control, iterative reconstruction, and/or weight based adjustment of the mA/kV was utilized to reduce the radiation dose to as low as reasonably achievable. COMPARISON: 06/12/2021 HISTORY: ORDERING SYSTEM PROVIDED HISTORY: Vomiting TECHNOLOGIST PROVIDED HISTORY: Reason for exam:->Vomiting Additional Contrast?->None Decision Support Exception - unselect if not a suspected or confirmed emergency medical condition->Emergency Medical Condition (MA) Reason for Exam: Emesis FINDINGS: Lower Chest: No cardiomegaly. No distal esophageal thickening is identified. No parenchymal infiltrate. Organs: Diffuse hepatic steatosis. The gallbladder, adrenal glands, and pancreas appear unremarkable. No enhancing renal mass. Nonobstructive left renal calculi. No ureteral calculi are identified. No periureteral stranding is seen. GI/Bowel: Visualized loops of bowel appear normal in caliber. No ileus or obstruction. No appendicitis or diverticulitis is identified. Pelvis: No pelvic mass. Bladder appears unremarkable. No free fluid in the pelvis. No bulky pelvic lymphadenopathy is identified. Peritoneum/Retroperitoneum: No abdominal aortic aneurysm. No dissection. No retroperitoneal or mesenteric lymphadenopathy is identified. Hernia plug repair noted bilaterally within the inguinal canal region. Bones/Soft Tissues: Prominent inguinal lymph nodes noted bilaterally, though smaller on the left than compared to the previous examination. The lymph node on the right is mildly increased in size. These may be reactive. 1. No ileus or obstruction is identified. No acute inflammatory bowel wall thickening is seen. 2. Mild fatty infiltration seen within the liver. 3. Nonobstructive left renal calculi.        CBC:   Recent Labs     08/31/22 2130 09/01/22  0600   WBC 12.9* 13.2*   HGB 13.0* 10.8*    314     BMP:    Recent Labs     08/31/22 2130 08/31/22  2327 09/01/22  0600   *  --  131*   K 6.2* 3.9 4.0   CL 92*  --  100   CO2 23  --  25   BUN 11  --  10   CREATININE 1.0  --  0.9   GLUCOSE 233*  --  200*     Hepatic:   Recent Labs 08/31/22 2130   AST 77*   ALT 20   BILITOT 1.1*   ALKPHOS 125     Lipids:   Lab Results   Component Value Date/Time    CHOL 138 07/06/2022 12:39 AM    CHOL 142 03/12/2020 08:26 AM    HDL 33 07/06/2022 12:39 AM    TRIG 294 07/06/2022 12:39 AM     Hemoglobin A1C:   Lab Results   Component Value Date/Time    LABA1C 10.5 07/28/2022 10:45 AM     TSH: No results found for: TSH  Troponin:   Lab Results   Component Value Date/Time    TROPONINT <0.010 05/21/2022 03:47 PM    TROPONINT <0.010 05/21/2022 09:40 AM    TROPONINT <0.010 05/20/2022 11:04 PM     Lactic Acid: No results for input(s): LACTA in the last 72 hours. BNP: No results for input(s): PROBNP in the last 72 hours.   UA:  Lab Results   Component Value Date/Time    NITRU NEGATIVE 07/06/2022 01:48 AM    COLORU YELLOW 07/06/2022 01:48 AM    WBCUA <1 07/06/2022 01:48 AM    RBCUA <1 07/06/2022 01:48 AM    MUCUS RARE 04/18/2022 03:01 PM    TRICHOMONAS NONE SEEN 07/06/2022 01:48 AM    BACTERIA NEGATIVE 07/06/2022 01:48 AM    CLARITYU CLEAR 07/06/2022 01:48 AM    SPECGRAV <1.005 07/06/2022 01:48 AM    LEUKOCYTESUR NEGATIVE 07/06/2022 01:48 AM    UROBILINOGEN 0.2 07/06/2022 01:48 AM    BILIRUBINUR NEGATIVE 07/06/2022 01:48 AM    BLOODU SMALL NUMBER OR AMOUNT OBSERVED 07/06/2022 01:48 AM    KETUA NEGATIVE 07/06/2022 01:48 AM     Urine Cultures: No results found for: LABURIN  Blood Cultures: No results found for: BC  No results found for: BLOODCULT2  Organism: No results found for: ORG    Time Spent Discharging patient 45 minutes    Electronically signed by Jenny Ontiveros MD on 9/2/2022 at 11:41 AM

## 2022-09-03 PROBLEM — M72.6 NECROTIZING FASCIITIS OF LOWER LEG (HCC): Status: ACTIVE | Noted: 2022-09-03

## 2022-09-03 PROBLEM — A41.9 SEPSIS (HCC): Status: ACTIVE | Noted: 2022-09-03

## 2022-09-03 PROBLEM — E11.628 TYPE 2 DIABETES MELLITUS WITH RIGHT DIABETIC FOOT INFECTION (HCC): Status: ACTIVE | Noted: 2022-09-03

## 2022-09-03 PROBLEM — K63.89 PNEUMATOSIS COLI: Status: ACTIVE | Noted: 2022-09-03

## 2022-09-03 PROBLEM — M72.6 NECROTIZING FASCIITIS (HCC): Status: ACTIVE | Noted: 2022-09-03

## 2022-09-03 PROBLEM — L97.522 DIABETIC ULCER OF TOE OF LEFT FOOT ASSOCIATED WITH TYPE 2 DIABETES MELLITUS, WITH FAT LAYER EXPOSED (HCC): Status: ACTIVE | Noted: 2022-06-15

## 2022-09-03 PROBLEM — L08.9 TYPE 2 DIABETES MELLITUS WITH RIGHT DIABETIC FOOT INFECTION (HCC): Status: ACTIVE | Noted: 2022-09-03

## 2022-09-03 LAB
DOSE AMOUNT: NORMAL
DOSE TIME: NORMAL
GLUCOSE BLD-MCNC: 184 MG/DL (ref 70–99)
GLUCOSE BLD-MCNC: 212 MG/DL (ref 70–99)
GLUCOSE BLD-MCNC: 260 MG/DL (ref 70–99)
GLUCOSE BLD-MCNC: 296 MG/DL (ref 70–99)
VANCOMYCIN RANDOM: 10.3 UG/ML

## 2022-09-03 PROCEDURE — 0Y6H0Z1 DETACHMENT AT RIGHT LOWER LEG, HIGH, OPEN APPROACH: ICD-10-PCS | Performed by: SURGERY

## 2022-09-03 PROCEDURE — 6360000002 HC RX W HCPCS

## 2022-09-03 PROCEDURE — 2500000003 HC RX 250 WO HCPCS

## 2022-09-03 PROCEDURE — 2580000003 HC RX 258: Performed by: NURSE PRACTITIONER

## 2022-09-03 PROCEDURE — 2500000003 HC RX 250 WO HCPCS: Performed by: NURSE PRACTITIONER

## 2022-09-03 PROCEDURE — 6370000000 HC RX 637 (ALT 250 FOR IP): Performed by: STUDENT IN AN ORGANIZED HEALTH CARE EDUCATION/TRAINING PROGRAM

## 2022-09-03 PROCEDURE — 80202 ASSAY OF VANCOMYCIN: CPT

## 2022-09-03 PROCEDURE — 6370000000 HC RX 637 (ALT 250 FOR IP): Performed by: NURSE PRACTITIONER

## 2022-09-03 PROCEDURE — 82962 GLUCOSE BLOOD TEST: CPT

## 2022-09-03 PROCEDURE — 6360000002 HC RX W HCPCS: Performed by: NURSE PRACTITIONER

## 2022-09-03 PROCEDURE — 6370000000 HC RX 637 (ALT 250 FOR IP): Performed by: PHYSICIAN ASSISTANT

## 2022-09-03 PROCEDURE — 2000000000 HC ICU R&B

## 2022-09-03 PROCEDURE — 94761 N-INVAS EAR/PLS OXIMETRY MLT: CPT

## 2022-09-03 PROCEDURE — 99024 POSTOP FOLLOW-UP VISIT: CPT | Performed by: SURGERY

## 2022-09-03 RX ORDER — OXYCODONE HYDROCHLORIDE 10 MG/1
10 TABLET ORAL EVERY 4 HOURS PRN
Status: DISCONTINUED | OUTPATIENT
Start: 2022-09-03 | End: 2022-09-09 | Stop reason: HOSPADM

## 2022-09-03 RX ORDER — DROPERIDOL 2.5 MG/ML
0.62 INJECTION, SOLUTION INTRAMUSCULAR; INTRAVENOUS
Status: DISPENSED | OUTPATIENT
Start: 2022-09-03 | End: 2022-09-03

## 2022-09-03 RX ORDER — OXYCODONE HYDROCHLORIDE 5 MG/1
5 TABLET ORAL PRN
Status: DISCONTINUED | OUTPATIENT
Start: 2022-09-03 | End: 2022-09-05 | Stop reason: HOSPADM

## 2022-09-03 RX ORDER — CLINDAMYCIN PHOSPHATE 900 MG/50ML
900 INJECTION INTRAVENOUS EVERY 8 HOURS
Status: DISCONTINUED | OUTPATIENT
Start: 2022-09-03 | End: 2022-09-09 | Stop reason: HOSPADM

## 2022-09-03 RX ORDER — OXYCODONE HYDROCHLORIDE 5 MG/1
10 TABLET ORAL PRN
Status: DISCONTINUED | OUTPATIENT
Start: 2022-09-03 | End: 2022-09-05 | Stop reason: HOSPADM

## 2022-09-03 RX ORDER — LOSARTAN POTASSIUM 25 MG/1
25 TABLET ORAL DAILY
Status: DISCONTINUED | OUTPATIENT
Start: 2022-09-03 | End: 2022-09-09 | Stop reason: HOSPADM

## 2022-09-03 RX ORDER — MAGNESIUM HYDROXIDE/ALUMINUM HYDROXICE/SIMETHICONE 120; 1200; 1200 MG/30ML; MG/30ML; MG/30ML
30 SUSPENSION ORAL EVERY 6 HOURS PRN
Status: DISCONTINUED | OUTPATIENT
Start: 2022-09-03 | End: 2022-09-09 | Stop reason: HOSPADM

## 2022-09-03 RX ORDER — LABETALOL HYDROCHLORIDE 5 MG/ML
10 INJECTION, SOLUTION INTRAVENOUS
Status: DISCONTINUED | OUTPATIENT
Start: 2022-09-03 | End: 2022-09-05 | Stop reason: HOSPADM

## 2022-09-03 RX ORDER — INSULIN GLARGINE 100 [IU]/ML
30 INJECTION, SOLUTION SUBCUTANEOUS NIGHTLY
Status: DISCONTINUED | OUTPATIENT
Start: 2022-09-03 | End: 2022-09-09 | Stop reason: HOSPADM

## 2022-09-03 RX ORDER — DEXTROSE MONOHYDRATE 100 MG/ML
INJECTION, SOLUTION INTRAVENOUS CONTINUOUS PRN
Status: DISCONTINUED | OUTPATIENT
Start: 2022-09-03 | End: 2022-09-09 | Stop reason: HOSPADM

## 2022-09-03 RX ORDER — ASPIRIN 81 MG/1
81 TABLET, CHEWABLE ORAL DAILY
COMMUNITY
End: 2022-12-09 | Stop reason: SDUPTHER

## 2022-09-03 RX ORDER — SODIUM CHLORIDE 9 MG/ML
25 INJECTION, SOLUTION INTRAVENOUS PRN
Status: DISCONTINUED | OUTPATIENT
Start: 2022-09-03 | End: 2022-09-05 | Stop reason: HOSPADM

## 2022-09-03 RX ORDER — SODIUM CHLORIDE 9 MG/ML
INJECTION, SOLUTION INTRAVENOUS PRN
Status: DISCONTINUED | OUTPATIENT
Start: 2022-09-03 | End: 2022-09-09 | Stop reason: HOSPADM

## 2022-09-03 RX ORDER — LIDOCAINE HYDROCHLORIDE 20 MG/ML
INJECTION, SOLUTION EPIDURAL; INFILTRATION; INTRACAUDAL; PERINEURAL PRN
Status: DISCONTINUED | OUTPATIENT
Start: 2022-09-03 | End: 2022-09-03 | Stop reason: SDUPTHER

## 2022-09-03 RX ORDER — METOPROLOL SUCCINATE 25 MG/1
25 TABLET, EXTENDED RELEASE ORAL DAILY
Status: DISCONTINUED | OUTPATIENT
Start: 2022-09-03 | End: 2022-09-09 | Stop reason: HOSPADM

## 2022-09-03 RX ORDER — INSULIN LISPRO 100 [IU]/ML
0-4 INJECTION, SOLUTION INTRAVENOUS; SUBCUTANEOUS NIGHTLY
Status: DISCONTINUED | OUTPATIENT
Start: 2022-09-03 | End: 2022-09-03

## 2022-09-03 RX ORDER — SODIUM CHLORIDE 0.9 % (FLUSH) 0.9 %
5-40 SYRINGE (ML) INJECTION EVERY 12 HOURS SCHEDULED
Status: DISCONTINUED | OUTPATIENT
Start: 2022-09-03 | End: 2022-09-09 | Stop reason: HOSPADM

## 2022-09-03 RX ORDER — MORPHINE SULFATE 2 MG/ML
1 INJECTION, SOLUTION INTRAMUSCULAR; INTRAVENOUS
Status: DISCONTINUED | OUTPATIENT
Start: 2022-09-03 | End: 2022-09-09 | Stop reason: HOSPADM

## 2022-09-03 RX ORDER — ONDANSETRON 4 MG/1
4 TABLET, ORALLY DISINTEGRATING ORAL EVERY 8 HOURS PRN
Status: DISCONTINUED | OUTPATIENT
Start: 2022-09-03 | End: 2022-09-09 | Stop reason: HOSPADM

## 2022-09-03 RX ORDER — OXYCODONE HYDROCHLORIDE 5 MG/1
5 TABLET ORAL EVERY 4 HOURS PRN
Status: DISCONTINUED | OUTPATIENT
Start: 2022-09-03 | End: 2022-09-09 | Stop reason: HOSPADM

## 2022-09-03 RX ORDER — SODIUM CHLORIDE 0.9 % (FLUSH) 0.9 %
5-40 SYRINGE (ML) INJECTION PRN
Status: DISCONTINUED | OUTPATIENT
Start: 2022-09-03 | End: 2022-09-05 | Stop reason: HOSPADM

## 2022-09-03 RX ORDER — ONDANSETRON 2 MG/ML
INJECTION INTRAMUSCULAR; INTRAVENOUS PRN
Status: DISCONTINUED | OUTPATIENT
Start: 2022-09-03 | End: 2022-09-03 | Stop reason: SDUPTHER

## 2022-09-03 RX ORDER — FENTANYL CITRATE 50 UG/ML
25 INJECTION, SOLUTION INTRAMUSCULAR; INTRAVENOUS EVERY 5 MIN PRN
Status: DISCONTINUED | OUTPATIENT
Start: 2022-09-03 | End: 2022-09-05 | Stop reason: HOSPADM

## 2022-09-03 RX ORDER — SODIUM CHLORIDE 0.9 % (FLUSH) 0.9 %
5-40 SYRINGE (ML) INJECTION EVERY 12 HOURS SCHEDULED
Status: DISCONTINUED | OUTPATIENT
Start: 2022-09-03 | End: 2022-09-05 | Stop reason: HOSPADM

## 2022-09-03 RX ORDER — INSULIN LISPRO 100 [IU]/ML
0-4 INJECTION, SOLUTION INTRAVENOUS; SUBCUTANEOUS NIGHTLY
Status: DISCONTINUED | OUTPATIENT
Start: 2022-09-03 | End: 2022-09-09 | Stop reason: HOSPADM

## 2022-09-03 RX ORDER — ATORVASTATIN CALCIUM 40 MG/1
40 TABLET, FILM COATED ORAL NIGHTLY
Status: DISCONTINUED | OUTPATIENT
Start: 2022-09-03 | End: 2022-09-09 | Stop reason: HOSPADM

## 2022-09-03 RX ORDER — DOCUSATE SODIUM 100 MG/1
100 CAPSULE, LIQUID FILLED ORAL DAILY
Status: DISCONTINUED | OUTPATIENT
Start: 2022-09-03 | End: 2022-09-09 | Stop reason: HOSPADM

## 2022-09-03 RX ORDER — INSULIN LISPRO 100 [IU]/ML
0-8 INJECTION, SOLUTION INTRAVENOUS; SUBCUTANEOUS
Status: DISCONTINUED | OUTPATIENT
Start: 2022-09-03 | End: 2022-09-03

## 2022-09-03 RX ORDER — HYDRALAZINE HYDROCHLORIDE 20 MG/ML
10 INJECTION INTRAMUSCULAR; INTRAVENOUS
Status: DISCONTINUED | OUTPATIENT
Start: 2022-09-03 | End: 2022-09-05 | Stop reason: HOSPADM

## 2022-09-03 RX ORDER — ENOXAPARIN SODIUM 100 MG/ML
40 INJECTION SUBCUTANEOUS DAILY
Status: DISCONTINUED | OUTPATIENT
Start: 2022-09-04 | End: 2022-09-05

## 2022-09-03 RX ORDER — ONDANSETRON 2 MG/ML
4 INJECTION INTRAMUSCULAR; INTRAVENOUS EVERY 6 HOURS PRN
Status: DISCONTINUED | OUTPATIENT
Start: 2022-09-03 | End: 2022-09-09 | Stop reason: HOSPADM

## 2022-09-03 RX ORDER — SODIUM CHLORIDE 0.9 % (FLUSH) 0.9 %
5-40 SYRINGE (ML) INJECTION PRN
Status: DISCONTINUED | OUTPATIENT
Start: 2022-09-03 | End: 2022-09-09 | Stop reason: HOSPADM

## 2022-09-03 RX ORDER — INSULIN LISPRO 100 [IU]/ML
0-16 INJECTION, SOLUTION INTRAVENOUS; SUBCUTANEOUS
Status: DISCONTINUED | OUTPATIENT
Start: 2022-09-03 | End: 2022-09-09 | Stop reason: HOSPADM

## 2022-09-03 RX ORDER — ONDANSETRON 2 MG/ML
4 INJECTION INTRAMUSCULAR; INTRAVENOUS
Status: ACTIVE | OUTPATIENT
Start: 2022-09-03 | End: 2022-09-03

## 2022-09-03 RX ORDER — IPRATROPIUM BROMIDE AND ALBUTEROL SULFATE 2.5; .5 MG/3ML; MG/3ML
1 SOLUTION RESPIRATORY (INHALATION)
Status: ACTIVE | OUTPATIENT
Start: 2022-09-03 | End: 2022-09-03

## 2022-09-03 RX ORDER — FLUOXETINE HYDROCHLORIDE 20 MG/1
80 CAPSULE ORAL DAILY
Status: DISCONTINUED | OUTPATIENT
Start: 2022-09-03 | End: 2022-09-09 | Stop reason: HOSPADM

## 2022-09-03 RX ORDER — MORPHINE SULFATE 2 MG/ML
2 INJECTION, SOLUTION INTRAMUSCULAR; INTRAVENOUS
Status: DISCONTINUED | OUTPATIENT
Start: 2022-09-03 | End: 2022-09-09 | Stop reason: HOSPADM

## 2022-09-03 RX ORDER — SUCRALFATE 1 G/1
1 TABLET ORAL EVERY 12 HOURS SCHEDULED
Refills: 0 | Status: DISCONTINUED | OUTPATIENT
Start: 2022-09-03 | End: 2022-09-09 | Stop reason: HOSPADM

## 2022-09-03 RX ORDER — PANTOPRAZOLE SODIUM 20 MG/1
20 TABLET, DELAYED RELEASE ORAL
Status: DISCONTINUED | OUTPATIENT
Start: 2022-09-03 | End: 2022-09-09 | Stop reason: HOSPADM

## 2022-09-03 RX ADMIN — ATORVASTATIN CALCIUM 40 MG: 40 TABLET, FILM COATED ORAL at 20:21

## 2022-09-03 RX ADMIN — HYDROMORPHONE HYDROCHLORIDE 0.5 MG: 1 INJECTION, SOLUTION INTRAMUSCULAR; INTRAVENOUS; SUBCUTANEOUS at 00:05

## 2022-09-03 RX ADMIN — INSULIN LISPRO 4 UNITS: 100 INJECTION, SOLUTION INTRAVENOUS; SUBCUTANEOUS at 13:05

## 2022-09-03 RX ADMIN — SUCRALFATE 1 G: 1 TABLET ORAL at 20:21

## 2022-09-03 RX ADMIN — CLINDAMYCIN IN 5 PERCENT DEXTROSE 900 MG: 18 INJECTION, SOLUTION INTRAVENOUS at 10:55

## 2022-09-03 RX ADMIN — FLUOXETINE 80 MG: 20 CAPSULE ORAL at 09:14

## 2022-09-03 RX ADMIN — VANCOMYCIN HYDROCHLORIDE 1000 MG: 1 INJECTION, POWDER, LYOPHILIZED, FOR SOLUTION INTRAVENOUS at 11:07

## 2022-09-03 RX ADMIN — CLINDAMYCIN IN 5 PERCENT DEXTROSE 900 MG: 18 INJECTION, SOLUTION INTRAVENOUS at 02:34

## 2022-09-03 RX ADMIN — ONDANSETRON 4 MG: 2 INJECTION INTRAMUSCULAR; INTRAVENOUS at 00:01

## 2022-09-03 RX ADMIN — Medication 100 MG: at 00:01

## 2022-09-03 RX ADMIN — CLINDAMYCIN IN 5 PERCENT DEXTROSE 900 MG: 18 INJECTION, SOLUTION INTRAVENOUS at 18:11

## 2022-09-03 RX ADMIN — CEFEPIME HYDROCHLORIDE 2000 MG: 2 INJECTION, POWDER, FOR SOLUTION INTRAMUSCULAR; INTRAVENOUS at 09:20

## 2022-09-03 RX ADMIN — INSULIN LISPRO 2 UNITS: 100 INJECTION, SOLUTION INTRAVENOUS; SUBCUTANEOUS at 10:55

## 2022-09-03 RX ADMIN — CEFEPIME HYDROCHLORIDE 2000 MG: 2 INJECTION, POWDER, FOR SOLUTION INTRAMUSCULAR; INTRAVENOUS at 20:17

## 2022-09-03 RX ADMIN — SODIUM CHLORIDE: 9 INJECTION, SOLUTION INTRAVENOUS at 09:14

## 2022-09-03 RX ADMIN — SUCRALFATE 1 G: 1 TABLET ORAL at 09:15

## 2022-09-03 RX ADMIN — DOCUSATE SODIUM 100 MG: 100 CAPSULE, LIQUID FILLED ORAL at 09:21

## 2022-09-03 RX ADMIN — VANCOMYCIN HYDROCHLORIDE 1000 MG: 1 INJECTION, POWDER, LYOPHILIZED, FOR SOLUTION INTRAVENOUS at 21:19

## 2022-09-03 RX ADMIN — INSULIN LISPRO 8 UNITS: 100 INJECTION, SOLUTION INTRAVENOUS; SUBCUTANEOUS at 17:30

## 2022-09-03 RX ADMIN — MORPHINE SULFATE 2 MG: 2 INJECTION, SOLUTION INTRAMUSCULAR; INTRAVENOUS at 07:21

## 2022-09-03 RX ADMIN — SODIUM CHLORIDE, PRESERVATIVE FREE 10 ML: 5 INJECTION INTRAVENOUS at 09:15

## 2022-09-03 RX ADMIN — SUGAMMADEX 200 MG: 100 INJECTION, SOLUTION INTRAVENOUS at 00:01

## 2022-09-03 RX ADMIN — INSULIN GLARGINE 30 UNITS: 100 INJECTION, SOLUTION SUBCUTANEOUS at 21:22

## 2022-09-03 RX ADMIN — OXYCODONE HYDROCHLORIDE 10 MG: 10 TABLET ORAL at 21:58

## 2022-09-03 RX ADMIN — PANTOPRAZOLE SODIUM 20 MG: 20 TABLET, DELAYED RELEASE ORAL at 07:44

## 2022-09-03 ASSESSMENT — PAIN DESCRIPTION - ORIENTATION
ORIENTATION: RIGHT

## 2022-09-03 ASSESSMENT — PAIN DESCRIPTION - FREQUENCY
FREQUENCY: CONTINUOUS
FREQUENCY: INTERMITTENT
FREQUENCY: INTERMITTENT

## 2022-09-03 ASSESSMENT — PAIN SCALES - GENERAL
PAINLEVEL_OUTOF10: 7
PAINLEVEL_OUTOF10: 3
PAINLEVEL_OUTOF10: 0
PAINLEVEL_OUTOF10: 1
PAINLEVEL_OUTOF10: 1

## 2022-09-03 ASSESSMENT — PAIN DESCRIPTION - DESCRIPTORS
DESCRIPTORS: ACHING
DESCRIPTORS: ACHING;DISCOMFORT

## 2022-09-03 ASSESSMENT — PAIN DESCRIPTION - PAIN TYPE
TYPE: SURGICAL PAIN

## 2022-09-03 ASSESSMENT — PAIN DESCRIPTION - ONSET: ONSET: ON-GOING

## 2022-09-03 ASSESSMENT — PAIN DESCRIPTION - LOCATION
LOCATION: LEG;ANKLE
LOCATION: LEG
LOCATION: LEG
LOCATION: ANKLE;LEG

## 2022-09-03 ASSESSMENT — PAIN - FUNCTIONAL ASSESSMENT
PAIN_FUNCTIONAL_ASSESSMENT: ACTIVITIES ARE NOT PREVENTED
PAIN_FUNCTIONAL_ASSESSMENT: ACTIVITIES ARE NOT PREVENTED

## 2022-09-03 NOTE — ED NOTES
Pt taken to PACU at this time. nad noted. rr even and unlabored. Mask in place.      Letty Robertson RN  09/02/22 8015

## 2022-09-03 NOTE — ANESTHESIA PRE PROCEDURE
Department of Anesthesiology  Preprocedure Note       Name:  Narinder Steve   Age:  39 y.o.  :  1980                                          MRN:  [id number]         Date:  2022      Surgeon: Alejandra Santa):  Hector Guerrero MD    Procedure: Procedure(s):  LEG AMPUTATION BELOW KNEE    Medications prior to admission:   Prior to Admission medications    Medication Sig Start Date End Date Taking?  Authorizing Provider   metroNIDAZOLE (FLAGYL) 500 MG tablet Take 1 tablet by mouth 3 times daily for 28 days 22  Estiven Iqbal MD   pantoprazole (PROTONIX) 20 MG tablet Take 1 tablet by mouth every morning (before breakfast) 22   Estiven Iqbal MD   metFORMIN (GLUCOPHAGE) 500 MG tablet Take 2 tablets by mouth 2 times daily (with meals) Indications: Start tomorrow 03/14 9/2/22 3/1/23  Estiven Iqbal MD   ondansetron (ZOFRAN-ODT) 4 MG disintegrating tablet Take 1 tablet by mouth every 8 hours as needed for Nausea or Vomiting 22   RAFAEL Tello CNP   metoclopramide (REGLAN) 10 MG tablet Take 1 tablet by mouth 4 times daily (before meals and nightly) 22  RAFAEL Tello CNP   sucralfate (CARAFATE) 1 GM/10ML suspension Take 10 mLs by mouth 4 times daily for 7 days 22  RAFAEL Navarro CNP   glyBURIDE (DIABETA) 5 MG tablet Take 2 tablets by mouth in the morning and at bedtime 22   RAFAEL Navarro CNP   Dulaglutide 1.5 MG/0.5ML SOPN Inject 1.5 mg into the skin once a week 22   RAFAEL Navarro CNP   metoprolol succinate (TOPROL XL) 25 MG extended release tablet Take 1 tablet by mouth in the morning. 22   RAFAEL Navarro CNP   losartan (COZAAR) 25 MG tablet Take 1 tablet by mouth in the morning. 7/29/22 10/27/22  RAFAEL Navarro CNP   omeprazole (PRILOSEC) 20 MG delayed release capsule Take 1 capsule by mouth once daily in the morning 22  RAFAEL Navarro - CNP   FLUoxetine (PROZAC) 40 MG capsule Take 2 capsules by mouth in the morning. 7/28/22 8/27/22  RAFAEL Mccoy - CNP   insulin glargine (LANTUS SOLOSTAR) 100 UNIT/ML injection pen Inject 30 Units into the skin nightly 6/30/22 9/28/22  Miguelangel Lofton MD   collagenase 250 UNIT/GM ointment Per instructions DAILY (route: topical) 5/23/22   Historical Provider, MD   Alcohol Swabs PADS  4/27/22   Historical Provider, MD   pioglitazone (ACTOS) 30 MG tablet Take 1 tablet by mouth daily 4/27/22 8/18/22  Cameron Vargas MD   atorvastatin (LIPITOR) 40 MG tablet Take 1 tablet by mouth nightly 4/27/22 8/18/22  Cameron Vargas MD   blood glucose monitor strips Test 2 times a day & as needed for symptoms of irregular blood glucose. Dispense sufficient amount for indicated testing frequency plus additional to accommodate PRN testing needs. 4/27/22   Cameron Vargas MD   blood glucose monitor kit and supplies Dispense sufficient amount for indicated testing frequency plus additional to accommodate PRN testing needs. Dispense all needed supplies to include: monitor, strips, lancing device, lancets, control solutions, alcohol swabs.  6/15/20   Lokesh Bah MD   FreeStyle Lancets MISC 1 each by Does not apply route daily 6/15/20   Lokesh Bah MD   acetaminophen (TYLENOL) 325 MG tablet Take 2 tablets by mouth every 4 hours as needed for Pain or Fever 2/28/18 9/2/22  Melquiades Bose MD       Current medications:    Current Outpatient Medications   Medication Sig Dispense Refill    metroNIDAZOLE (FLAGYL) 500 MG tablet Take 1 tablet by mouth 3 times daily for 28 days 84 tablet 0    pantoprazole (PROTONIX) 20 MG tablet Take 1 tablet by mouth every morning (before breakfast) 30 tablet 0    metFORMIN (GLUCOPHAGE) 500 MG tablet Take 2 tablets by mouth 2 times daily (with meals) Indications: Start tomorrow 03/14 360 tablet 1    ondansetron (ZOFRAN-ODT) 4 MG disintegrating tablet Take 1 tablet by mouth every 8 hours as needed for Nausea or Vomiting 14 tablet 0    E11.40    Essential hypertension I10    Gastroesophageal reflux disease without esophagitis K21.9    Chest pain R07.9    Moderate nonproliferative diabetic retinopathy of both eyes without macular edema associated with type 2 diabetes mellitus ([address] O59.7932    Acute osteomyelitis ([facility]) M86.10    Acute osteomyelitis of foot ([address] M86.179    Abscess of left foot L02.612    Cellulitis of left foot L03. 116    Subacute osteomyelitis of left foot ([facility]) M86.272    Anemia associated with acute blood loss D62    Anxiety attack F41.0    WD-Diabetic ulcer of left midfoot associated with type 2 diabetes mellitus, with fat layer exposed ([address] E11.621, L97.422    WD-Diabetic ulcer of right midfoot associated with type 2 diabetes mellitus, with fat layer exposed ([address] E11.621, L97.412    WD-Partial nontraumatic amputation of foot, left ([facility]) A86.936    Severe episode of recurrent major depressive disorder, without psychotic features ([address] F33.2    Insomnia G47.00    Intractable vomiting with nausea R11.2    Intractable nausea and vomiting R11.2       Past Medical History:        Diagnosis Date    Abscess of left foot 4/22/2022    Anemia associated with acute blood loss 4/26/2022    Callus of foot     Right foot - see's Dr. Lisandro Smith    Cellulitis of left foot 4/22/2022    Diabetic ulcer of left midfoot associated with type 2 diabetes mellitus, with muscle involvement without evidence of necrosis ([address] 4/26/2022    Diabetic ulcer of left midfoot associated with type 2 diabetes mellitus, with necrosis of muscle ([address] 6/7/2021    Diabetic ulcer of right midfoot associated with type 2 diabetes mellitus, with fat layer exposed ([address] 8/11/2020    Dizziness     positional    Essential hypertension     Follows with PCP    Hyperlipidemia     Lumbar radiculopathy     Septic embolism ([address] 6/13/2020    Subacute osteomyelitis of left foot ([address] 4/22/2022       Past Surgical History:        Procedure Laterality Date    ACHILLES TENDON SURGERY Right 1/8/2021    RIGHT ACHILLES TENDON LENGTHENING REPAIR performed by Lowry Barthel, DPM at [address]  03/2018    [facility]    DENTAL SURGERY      teeth extractions -half per patient    HERNIA REPAIR Bilateral 5/27/2020    BIALTERAL HERNIA INGUINAL REPAIR performed by Chioma Carter MD at [address] [place], [place] [address]  2018    [place] OFFICE/OUTPT VISIT,PROCEDURE ONLY Left 4/17/2018    L5-S1 HEMILAMINECTOMY, REMOVAL OF DISC LEFT SIDE performed by Yuriy Alvarez MD at [address] Right 1/8/2021    RIGHT TRANSMETATARSAL TOE AMPUTATION performed by Lowry Barthel, DPM at [facility] TOE AMPUTATION Left 4/23/2022    LEFT RAY GREAT TOE AMPUTATION performed by Martin Workman MD at [address] [facility]         Social History:    Social History     Tobacco Use    Smoking status: Never    Smokeless tobacco: Never   Substance Use Topics    Alcohol use: Yes     Comment: \"couple beers a night\"                                Counseling given: Not Answered      Vital Signs (Current): There were no vitals filed for this visit.                                            BP Readings from Last 3 Encounters:   09/02/22 (!) 169/80   09/02/22 137/80   08/30/22 (!) 165/94       NPO Status:                                                                                 BMI:   Wt Readings from Last 3 Encounters:   09/01/22 184 lb 11.9 oz (83.8 kg)   08/28/22 180 lb (81.6 kg)   08/18/22 185 lb (83.9 kg)     There is no height or weight on file to calculate BMI.    CBC:   Lab Results   Component Value Date/Time    WBC 13.9 09/02/2022 06:45 PM    RBC 4.24 09/02/2022 06:45 PM    HGB 12.1 09/02/2022 06:45 PM    HCT 38.7 09/02/2022 06:45 PM    MCV 91.3 09/02/2022 06:45 PM    RDW 15.3 09/02/2022 06:45 PM     09/02/2022 06:45 PM       CMP:   Lab Results   Component Value Date/Time     09/02/2022 06:45 PM    K 3.9 09/02/2022 06:45 PM    K 3.8 03/14/2018 06:01 AM    CL 95 09/02/2022 06:45 PM    CO2 24 09/02/2022 06:45 PM    BUN 6 09/02/2022 06:45 PM    CREATININE 1.0 09/02/2022 06:45 PM    GFRAA >60 09/02/2022 06:45 PM    AGRATIO 1.4 03/12/2020 08:26 AM    LABGLOM >60 09/02/2022 06:45 PM    GLUCOSE 143 09/02/2022 06:45 PM    PROT 8.7 09/02/2022 06:45 PM    CALCIUM 8.6 09/02/2022 06:45 PM    BILITOT 0.7 09/02/2022 06:45 PM    ALKPHOS 99 09/02/2022 06:45 PM    AST 35 09/02/2022 06:45 PM    ALT 17 09/02/2022 06:45 PM       POC Tests:   Recent Labs     09/02/22  1113   POCGLU 143*       Coags:   Lab Results   Component Value Date/Time    PROTIME 11.5 05/20/2022 11:04 PM    INR 0.89 05/20/2022 11:04 PM    APTT 27.9 05/20/2022 11:04 PM       HCG (If Applicable): No results found for: PREGTESTUR, PREGSERUM, HCG, HCGQUANT     ABGs: No results found for: PHART, PO2ART, QUT4WEV, GWU9MWZ, BEART, G9GFQCKM     Type & Screen (If Applicable):  Lab Results   Component Value Date    LABRH POS 04/17/2018       Drug/Infectious Status (If Applicable):  No results found for: HIV, HEPCAB    COVID-19 Screening (If Applicable):   Lab Results   Component Value Date/Time    COVID19 NOT DETECTED 07/06/2022 03:13 AM    COVID19 NOT DETECTED 06/09/2021 01:50 PM           Anesthesia Evaluation  Patient summary reviewed no history of anesthetic complications:   Airway: Mallampati: II  TM distance: >3 FB   Neck ROM: full  Mouth opening: > = 3 FB   Dental:          Pulmonary:normal exam    (+) current smoker                           Cardiovascular:  Exercise tolerance: no interval change,   (+) hypertension:, angina:,       ECG reviewed      Echocardiogram reviewed         Beta Blocker:  Dose within 24 Hrs      ROS comment: 2021 [facility]  Procedure Type      Procedure Summary   normal coronaries and normal LVEF      Recommendations   Optimize medications      Signatures    2022 echo   Summary   Left ventricular systolic function is normal.   Ejection fraction is visually estimated at 55%. No significant valvular disease noted. No evidence of any pericardial effusion     Neuro/Psych:   (+) neuromuscular disease:, psychiatric history: stable with treatment            GI/Hepatic/Renal:   (+) GERD: no interval change,           Endo/Other:    (+) DiabetesType II DM, no interval change, , .                  ROS comment: left foot osteomyelitis. Abdominal:             Vascular:   + DVT, .        ROS comment: DVT left lower extremity acute          Doppler shows occlusive left peroneal deep venous thrombosis         Risk factors include prolonged immobility, infection. Seen on venous duplex LLE. Other Findings:             Anesthesia Plan      general     ASA 3 - emergent       Induction: intravenous. MIPS: Postoperative opioids intended and Prophylactic antiemetics administered. Anesthetic plan and risks discussed with patient. Use of blood products discussed with patient whom consented to blood products. Plan discussed with CRNA.                     Corinne Dennis, MD   9/2/2022

## 2022-09-03 NOTE — CONSULTS
V2.0  History and Physical      Name:  Deborah Irving /Age/Sex: 1980  (39 y.o. male)   MRN & CSN:  [id number] & [id number] Encounter Date/Time: 2022 1:21 PM EDT   Location:  [address] [place] [address] PCP: Ryan Gallo [address] [facility] Day: 2    Assessment and Plan:   Deborah Irving is a 39 y.o. male with hypertension, type 2 diabetes with long-term insulin use, mood disorder who presents with Necrotizing fasciitis [facility])    Hospital Problems             Last Modified POA    * (Principal) Necrotizing fasciitis ([address] 9/3/2022 Yes    Diabetic ulcer of toe of left foot associated with type 2 diabetes mellitus, with fat layer exposed ([address] 9/3/2022 Yes    Sepsis ([address] 9/3/2022 Yes    Type 2 diabetes mellitus, with long-term current use of insulin ([address] 9/3/2022 Yes     *Sepsis present on admission secondary to necrotizing fasciitis of the right lower extremity  -Status post right BKA and wound VAC placement 2022  Started on Vanc, cefepime, clindamycin by general surgery  If appropriate source control has been achieved, recommend discontinuing antibiotics sooner than later, will defer final decision to general surgeon. And if the decision is made to continue antibiotics will recommend to switch cefepime to meropenem.   Optimized pain regimen  Optimized bowel regimen  Wound care consulted    *Diabetic ulcer of toe of left foot associated with type 2 diabetes mellitus with fat layer exposed  Wound care consulted    *Type 2 diabetes mellitus with long-term use of insulin  Medium dose sliding scale insulin  Target blood sugar 140-180    *Mood disorder-continue fluoxetine    *Hypertension  Will hold home antihypertensive regimen given recent procedure and sepsis    Disposition:   Current Living situation: Home  Expected Disposition: Home  Estimated D/C: Unknown    Diet Consistent carb diet   DVT Prophylaxis [x] Lovenox, []  Heparin, [] SCDs, [] Ambulation,  [] Eliquis, [] Xarelto   Code Status Full Code Surrogate Decision Maker/ KOREY Loya     History from:     patient    History of Present Illness:     Chief Complaint: Necrotizing fasciitis [facility])  Dorothy Crowell is a 39 y.o. male with hypertension, type 2 diabetes with long-term insulin use, mood disorder who presents with Necrotizing fasciitis ([address] Patient was recently discharged from the hospital on 9/2/2022, when he was admitted for pneumatosis coli. Patient states that upon reaching home he started having excruciating right lower extremity pain, erythema and started noticing clear discharge from his right lower extremity, which prompted him to come to the ER. Upon arrival into the ER imaging revealed necrotizing fasciitis and the patient was taken to the OR. Patient underwent right BKA with wound VAC placement and was sent to the ICU for closer monitoring. Patient was hemodynamically stable, alert oriented x3 and stated that the pain is well controlled. Review of Systems: Need 10 Elements   Review of Systems   Constitutional:  Negative for appetite change, chills, fever and unexpected weight change. HENT:  Negative for ear pain, hearing loss, sinus pressure, sinus pain and voice change. Eyes:  Negative for pain, discharge and visual disturbance. Respiratory:  Negative for cough, chest tightness and shortness of breath. Cardiovascular:  Negative for chest pain, palpitations and leg swelling. Gastrointestinal:  Negative for abdominal pain, blood in stool, diarrhea, nausea and vomiting. Genitourinary:  Negative for dysuria, frequency and urgency. Musculoskeletal:  Negative for arthralgias and back pain. Right BKA and left lower extremity wound   Neurological:  Negative for dizziness, weakness, light-headedness and headaches. Psychiatric/Behavioral:  Negative for sleep disturbance.       Objective:   No intake or output data in the 24 hours ending 09/17/22 1321   Vitals:   Vitals:    09/08/22 0803 09/08/22 0805 09/08/22 1447 09/08/22 2007   BP: (!) 159/92  (!) 142/81 (!) 161/85   Pulse: 73  77 78   Resp: 16 18 18 16   Temp: 98.4 °F (36.9 °C)  98.2 °F (36.8 °C) 98.3 °F (36.8 °C)   TempSrc: Oral  Oral Oral   SpO2:   97% 98%   Weight:       Height:           Medications Prior to Admission     Prior to Admission medications    Medication Sig Start Date End Date Taking? Authorizing Provider   aspirin 81 MG chewable tablet Take 81 mg by mouth daily   Yes Historical Provider, MD   oxyCODONE (ROXICODONE) 5 MG immediate release tablet Take 1 tablet by mouth every 6 hours as needed for Pain for up to 7 days.  9/16/22 9/23/22  Alfreda Fenton MD   docusate sodium (COLACE, DULCOLAX) 100 MG CAPS Take 100 mg by mouth daily 9/17/22   REDD Schmidt MD   pantoprazole (PROTONIX) 20 MG tablet Take 1 tablet by mouth every morning (before breakfast) 9/2/22   Swetha Krishnan MD   metFORMIN (GLUCOPHAGE) 500 MG tablet Take 2 tablets by mouth 2 times daily (with meals) Indications: Start tomorrow 03/14 9/2/22 9/16/22  Swetha Krishnan MD   metoclopramide (REGLAN) 10 MG tablet Take 1 tablet by mouth 4 times daily (before meals and nightly) 8/29/22 9/2/22  RAFAEL Santana CNP   sucralfate (CARAFATE) 1 GM/10ML suspension Take 10 mLs by mouth 4 times daily for 7 days 8/18/22 8/25/22  RAFAEL Resendiz CNP   glyBURIDE (DIABETA) 5 MG tablet Take 2 tablets by mouth in the morning and at bedtime 8/1/22   RAFAEL Resendiz CNP   Dulaglutide 1.5 MG/0.5ML SOPN Inject 1.5 mg into the skin once a week 8/1/22   RAFAEL Resendiz CNP   metoprolol succinate (TOPROL XL) 25 MG extended release tablet Take 1 tablet by mouth in the morning. 7/29/22   RAFAEL Resendiz CNP   losartan (COZAAR) 25 MG tablet Take 1 tablet by mouth in the morning. 7/29/22 10/27/22  RAFAEL Resendiz CNP   omeprazole (PRILOSEC) 20 MG delayed release capsule Take 1 capsule by mouth once daily in the morning 7/29/22 9/2/22  RAFAEL Resendiz CNP   FLUoxetine (PROZAC) 40 MG capsule Take 2 capsules by mouth in the morning. 7/28/22 8/27/22  Rony Burger APRN - CNP   insulin glargine (LANTUS SOLOSTAR) 100 UNIT/ML injection pen Inject 30 Units into the skin nightly 6/30/22 9/28/22  Nannette Huddleston MD   Alcohol Swabs PADS  4/27/22   Historical Provider, MD   atorvastatin (LIPITOR) 40 MG tablet Take 1 tablet by mouth nightly 4/27/22 8/18/22  Creta Landau, MD   blood glucose monitor strips Test 2 times a day & as needed for symptoms of irregular blood glucose. Dispense sufficient amount for indicated testing frequency plus additional to accommodate PRN testing needs. 4/27/22   Creta Landau, MD   pioglitazone (ACTOS) 30 MG tablet Take 1 tablet by mouth daily 4/27/22 9/16/22  Creta Landau, MD   blood glucose monitor kit and supplies Dispense sufficient amount for indicated testing frequency plus additional to accommodate PRN testing needs. Dispense all needed supplies to include: monitor, strips, lancing device, lancets, control solutions, alcohol swabs. 6/15/20   Briseida Mason MD   FreeStyle Lancets MISC 1 each by Does not apply route daily 6/15/20   Briseida Mason MD   acetaminophen (TYLENOL) 325 MG tablet Take 2 tablets by mouth every 4 hours as needed for Pain or Fever 2/28/18 9/2/22  Clelia Goldberg, MD       Physical Exam: Need 8 Elements   Physical Exam  Vitals reviewed. Constitutional:       Appearance: Normal appearance. He is normal weight. HENT:      Head: Normocephalic. Nose: Nose normal.      Mouth/Throat:      Mouth: Mucous membranes are moist.   Eyes:      Conjunctiva/sclera: Conjunctivae normal.      Pupils: Pupils are equal, round, and reactive to light. Cardiovascular:      Rate and Rhythm: Normal rate and regular rhythm. Pulses: Normal pulses. Heart sounds: Normal heart sounds. No murmur heard. Pulmonary:      Effort: Pulmonary effort is normal.      Breath sounds: Normal breath sounds. No wheezing, rhonchi or rales. Abdominal:      General: Abdomen is flat. Bowel sounds are normal. There is no distension. Palpations: Abdomen is soft. Tenderness: There is no abdominal tenderness. Musculoskeletal:         General: Deformity present. Normal range of motion. Cervical back: Normal range of motion and neck supple. Right lower leg: No edema. Left lower leg: No edema. Comments: Right BKA with wound VAC  Left lower extremity ulcer, appears non infectious   Skin:     Coloration: Skin is not jaundiced or pale. Neurological:      General: No focal deficit present. Mental Status: He is alert and oriented to person, place, and time. Mental status is at baseline. Past History:   PMHx   Past Medical History:   Diagnosis Date    Abscess of left foot 4/22/2022    Anemia associated with acute blood loss 4/26/2022    Callus of foot     Right foot - see's Dr. Corinna Guerra    Cellulitis of left foot 4/22/2022    Diabetic ulcer of left midfoot associated with type 2 diabetes mellitus, with muscle involvement without evidence of necrosis ([address] 4/26/2022    Diabetic ulcer of left midfoot associated with type 2 diabetes mellitus, with necrosis of muscle ([address] 6/7/2021    Diabetic ulcer of right midfoot associated with type 2 diabetes mellitus, with fat layer exposed ([address] 8/11/2020    Dizziness     positional    Essential hypertension     Follows with PCP    Hyperlipidemia     Lumbar radiculopathy     Septic embolism ([address] 6/13/2020    Subacute osteomyelitis of left foot ([address] 4/22/2022        PSHX:  has a past surgical history that includes Cardiac catheterization (03/2018); [place] tooth extraction; Dental surgery; pr office/outpt visit,procedure only (Left, 4/17/2018); lumbar laminectomy (2018); Toe amputation (Right, 1/8/2021); Achilles tendon surgery (Right, 1/8/2021); hernia repair (Bilateral, 5/27/2020); Toe amputation (Left, 4/23/2022);  Leg amputation below knee (Right, 9/2/2022); and Leg amputation below knee (Right, 9/5/2022). Fam HX: family history includes Diabetes in his father and mother; Heart Attack in his mother; Heart Disease in his father and mother; Kidney Disease in his mother; Other in his mother. Soc HX:   Social History     Socioeconomic History    Marital status: Single     Spouse name: None    Number of children: None    Years of education: None    Highest education level: None   Tobacco Use    Smoking status: Never    Smokeless tobacco: Never   Vaping Use    Vaping Use: Never used   Substance and Sexual Activity    Alcohol use: Yes     Comment: \"couple beers a night\"    Drug use: No    Sexual activity: Yes     Partners: Female     Social Determinants of Health     Financial Resource Strain: Medium Risk    Difficulty of Paying Living Expenses: Somewhat hard   Food Insecurity: Food Insecurity Present    Worried About Running Out of Food in the Last Year: Sometimes true    Ran Out of Food in the Last Year: Sometimes true   Transportation Needs: No Transportation Needs    Lack of Transportation (Medical): No    Lack of Transportation (Non-Medical): No   Physical Activity: Inactive    Days of Exercise per Week: 0 days    Minutes of Exercise per Session: 0 min   Stress: Stress Concern Present    Feeling of Stress : Rather much   Social Connections: Unknown    Frequency of Communication with Friends and Family: Once a week    Attends Yarsanism Services: Never    Attends Club or Organization Meetings: Never    Marital Status: Never    Housing Stability: Low Risk     Unable to Pay for Housing in the Last Year: No    Number of Places Lived in the Last Year: 1    Unstable Housing in the Last Year: No       Allergies: Allergies: No Known Allergies    Medications:   Medications:    Infusions:   PRN Meds:     Labs      CBC: No results for input(s): WBC, HGB, PLT in the last 72 hours. BMP:  No results for input(s): NA, K, CL, CO2, BUN, CREATININE, GLUCOSE in the last 72 hours.   Hepatic: No results for input(s): AST, ALT, ALB, BILITOT, ALKPHOS in the last 72 hours. Lipids:   Lab Results   Component Value Date/Time    CHOL 138 07/06/2022 12:39 AM    CHOL 142 03/12/2020 08:26 AM    HDL 33 07/06/2022 12:39 AM    TRIG 294 07/06/2022 12:39 AM     Hemoglobin A1C:   Lab Results   Component Value Date/Time    LABA1C 10.5 07/28/2022 10:45 AM     TSH: No results found for: TSH  Troponin:   Lab Results   Component Value Date/Time    TROPONINT <0.010 05/21/2022 03:47 PM    TROPONINT <0.010 05/21/2022 09:40 AM    TROPONINT <0.010 05/20/2022 11:04 PM     Lactic Acid: No results for input(s): LACTA in the last 72 hours. BNP: No results for input(s): PROBNP in the last 72 hours. UA:  Lab Results   Component Value Date/Time    NITRU NEGATIVE 07/06/2022 01:48 AM    COLORU YELLOW 07/06/2022 01:48 AM    WBCUA <1 07/06/2022 01:48 AM    RBCUA <1 07/06/2022 01:48 AM    MUCUS RARE 04/18/2022 03:01 PM    TRICHOMONAS NONE SEEN 07/06/2022 01:48 AM    BACTERIA NEGATIVE 07/06/2022 01:48 AM    CLARITYU CLEAR 07/06/2022 01:48 AM    SPECGRAV <1.005 07/06/2022 01:48 AM    LEUKOCYTESUR NEGATIVE 07/06/2022 01:48 AM    UROBILINOGEN 0.2 07/06/2022 01:48 AM    BILIRUBINUR NEGATIVE 07/06/2022 01:48 AM    BLOODU SMALL NUMBER OR AMOUNT OBSERVED 07/06/2022 01:48 AM    KETUA NEGATIVE 07/06/2022 01:48 AM     Urine Cultures: No results found for: LABURIN  Blood Cultures: No results found for: BC  No results found for: BLOODCULT2  Organism: No results found for: ORG    Imaging/Diagnostics Last 24 Hours   No results found. Personally reviewed Lab Studies, Imaging, and discussed case with RN and patient.     Electronically signed by Dami Curtis MD on 9/17/2022 at 1:21 PM

## 2022-09-03 NOTE — BRIEF OP NOTE
GENERAL SURGERY BRIEF OPERATIVE NOTE  [address] [place] [address]    PATIENT: Duong Bolanos 1980   MRN: [id number]    DATE: 9/3/2022    SURGEON: Ailin Kauffman MD    CASE ID: [id number]     PROCEDURE(S) PERFORMED:      LEG AMPUTATION BELOW KNEE: 34142 (CPT®) - RIGHT    PREOPERATIVE DIAGNOSIS:  necrotizing fasciitis    POSTOPERATIVE DIAGNOSIS:   same    INDICATIONS: Duong Bolanos is a 39 y.o. male presenting with acute deterioration of a chronic diabetic foot ulcer with concern for necrotizing fasciitis for which right below the knee amputation with wound vac placement has been discussed. The risks, benefits, potential complications and possible alternatives of the procedure were discussed in detail, including complications of but not limited to infection, bleeding, anesthesia-related complications, death, injury to surrounding structures, pain, need for further amputation or surgery, or need for additional interventions. All questions were answered. The patient wished to proceed and consent was documented in the medical record. FINDINGS:   Diabetic foot wound with associated edema and abscess, drainage consistent with necrotizing fasciitis    ANESTHESIA:   General endotracheal anesthesia  Anesthesiologist: Emma Hawley MD  CRNA: RAFAEL Chavez CRNA    STAFF:   Scrub Person First: Trey Aviles RN    First Assistant: RAFAEL Martins-CNP   The use of a first assistant was necessary for the proper positioning, prepping, and draping of the patient, as well as the safe and expeditious execution of the case and closure of skin and subcutaneous tissues. ESTIMATED BLOOD LOSS: Minimal    SPECIMEN(S):   ID Type Source Tests Collected by Time Destination   A : Right Below the Knee Amputation Specimen Leg SURGICAL PATHOLOGY Sade Sands MD 9/2/2022 2557        DRAINS & IMPLANTS:  * No implants in log *     COMPLICATIONS: none    DISPOSITION: ICU - hemodynamically stable. CONDITION: stable     Electronically signed:   Gagan Chacon MD 9/3/2022 12:13 AM

## 2022-09-03 NOTE — PROGRESS NOTES
[address] [facility]  consulted by KAMI Cantu for monitoring and adjustment. Indication for treatment: Necrotizing fasciitis  Goal trough: [x] 10-15 mcg/mL or [] 15-20 mcg/ml  AUC/LIDIA: [x] <500 or [] 400-600    Pertinent Laboratory Values:   Temp Readings from Last 3 Encounters:   09/03/22 98.2 °F (36.8 °C) (Oral)   09/02/22 98.6 °F (37 °C) (Oral)   08/30/22 99 °F (37.2 °C) (Temporal)     Recent Labs     08/31/22 2130 08/31/22  2327 09/01/22  0600 09/02/22  1212 09/02/22  1845   WBC 12.9*  --  13.2* 11.1* 13.9*   LACTATE 2.2* 1.7  --   --  1.4     Recent Labs     08/31/22 2130 09/01/22  0600 09/02/22  1845   BUN 11 10 6   CREATININE 1.0 0.9 1.0     Estimated Creatinine Clearance: 107 mL/min (based on SCr of 1 mg/dL). Intake/Output Summary (Last 24 hours) at 9/3/2022 0129  Last data filed at 9/3/2022 0041  Gross per 24 hour   Intake 1217.88 ml   Output 30 ml   Net 1187.88 ml       Pertinent Cultures:  Date    Source    Results  9/02   Blood x 2   Ordered      Vancomycin level:   TROUGH:  No results for input(s): VANCOTROUGH in the last 72 hours. RANDOM:  No results for input(s): VANCORANDOM in the last 72 hours. Assessment:  SCr, BUN, and urine output: Scr slightly elevated, BUN = 6, UOP low  S/p right leg amputation below knee  Day(s) of therapy: 1 of 7  Vancomycin concentration: Pending    Plan:  Received vancomycin 2000 mg x 1 in the ED  Start vancomycin 1000 mg IVPB q12h  Predicted trough of 14.2 and AUC: 459 at steady-state  Pharmacy will continue to monitor patient and adjust therapy as indicated    Abram 3 9/03 @0600    Thank you for the consult.   SHARON Wilkins [facility]  9/3/2022 1:29 AM

## 2022-09-03 NOTE — PROGRESS NOTES
GENERAL SURGERY INPATIENT POST-OP NOTE  [facility]    PATIENT: Ry Goddard, 39 y.o., male, MRN: [id number]    Hospital Day:  LOS: 0 days , Post-Op Day: 1 Day Post-Op    Procedure(s): 22 RIGHT LEG AMPUTATION BELOW KNEE    Ry Goddard is a 39 y.o. male who presented with history of diabetic foot ulcers, recent hospital discharge for pneumatosis coli, now presenting with acute worsening of his right foot wound concerning for necrotizing fasciitis             Subjective:  Chief Complaint: better today  Pain: 0/10  BM:    Diet: ADULT DIET; Regular; 4 carb choices (60 gm/meal)  Activity: with assistance    Stable overnight. Denies pain today. Wound vac in place, denies issues.      Objective:    Vitals: /79   Pulse 80   Temp 98.3 °F (36.8 °C) (Oral)   Resp 19   Ht 6' (1.829 m)   Wt 192 lb 14.4 oz (87.5 kg)   SpO2 96%   BMI 26.16 kg/m²   Vital Signs (Last 24 Hours)  Temp  Av.2 °F (36.8 °C)  Min: 97.5 °F (36.4 °C)  Max: 98.5 °F (36.9 °C)  Pulse  Av.5  Min: 68  Max: 105  BP  Min: 126/85  Max: 169/80  Resp  Av.5  Min: 14  Max: 29  SpO2  Av.3 %  Min: 93 %  Max: 100 %  Wt Readings from Last 3 Encounters:   22 192 lb 14.4 oz (87.5 kg)   22 184 lb 11.9 oz (83.8 kg)   22 180 lb (81.6 kg)       I/O:  1901 -  0700  In: 1217.9 [I.V.:714]  Out: 30     IV Fluids:   sodium chloride    sodium chloride Last Rate: 20 mL/hr at 22 0914    dextrose    Scheduled Meds:   sodium chloride flush, 5-40 mL, IntraVENous, 2 times per day    sodium chloride flush, 5-40 mL, IntraVENous, 2 times per day    [START ON 2022] enoxaparin, 40 mg, SubCUTAneous, Daily    cefepime, 2,000 mg, IntraVENous, Q12H    clindamycin (CLEOCIN) IV, 900 mg, IntraVENous, Q8H    vancomycin, 1,000 mg, IntraVENous, Q12H    docusate sodium, 100 mg, Oral, Daily    atorvastatin, 40 mg, Oral, Nightly    FLUoxetine, 80 mg, Oral, Daily    [Held by provider] losartan, 25 mg, Oral, Daily    [Held by provider] metoprolol succinate, 25 mg, Oral, Daily    pantoprazole, 20 mg, Oral, QAM AC    sucralfate, 1 g, Oral, 2 times per day    insulin glargine, 30 Units, SubCUTAneous, Nightly    insulin lispro, 0-16 Units, SubCUTAneous, TID WC    insulin lispro, 0-4 Units, SubCUTAneous, Nightly    Physical Exam:  General Appearance:   Alert, cooperative, no distress    Head:   Normocephalic, atraumatic    Lungs:    Equal chest rise, respirations unlabored   Heart:   Regular rate and rhythm    Abdomen:    Soft, non-tender, no rebound or guarding    Extremities:  No cyanosis, improving edema in the right BKA stump, no erythema.  Wound vac C/D/I  Left foot with chronic ulcers of the medial foot and plantar foot, stable   Neurologic:  Nonfocal, grossly intact, peripheral neuropathy        Labs/Imaging Results:   Recent Results (from the past 24 hour(s))   CBC with Auto Differential    Collection Time: 09/02/22  6:45 PM   Result Value Ref Range    WBC 13.9 (H) 4.0 - 10.5 K/CU MM    RBC 4.24 (L) 4.6 - 6.2 M/CU MM    Hemoglobin 12.1 (L) 13.5 - 18.0 GM/DL    Hematocrit 38.7 (L) 42 - 52 %    MCV 91.3 78 - 100 FL    MCH 28.5 27 - 31 PG    MCHC 31.3 (L) 32.0 - 36.0 %    RDW 15.3 (H) 11.7 - 14.9 %    Platelets 293 609 - 503 K/CU MM    MPV 9.6 7.5 - 11.1 FL    Differential Type AUTOMATED DIFFERENTIAL     Segs Relative 83.7 (H) 36 - 66 %    Lymphocytes % 7.1 (L) 24 - 44 %    Monocytes % 8.1 (H) 0 - 4 %    Eosinophils % 0.3 0 - 3 %    Basophils % 0.2 0 - 1 %    Segs Absolute 11.6 K/CU MM    Lymphocytes Absolute 1.0 K/CU MM    Monocytes Absolute 1.1 K/CU MM    Eosinophils Absolute 0.0 K/CU MM    Basophils Absolute 0.0 K/CU MM    Nucleated RBC % 0.0 %    Total Nucleated RBC 0.0 K/CU MM    Total Immature Neutrophil 0.08 K/CU MM    Immature Neutrophil % 0.6 (H) 0 - 0.43 %   CMP    Collection Time: 09/02/22  6:45 PM   Result Value Ref Range    Sodium 132 (L) 135 - 145 MMOL/L    Potassium 3.9 3.5 - 5.1 MMOL/L    Chloride 95 (L) 99 - 110 mMol/L CO2 24 21 - 32 MMOL/L    BUN 6 6 - 23 MG/DL    Creatinine 1.0 0.9 - 1.3 MG/DL    Glucose 143 (H) 70 - 99 MG/DL    Calcium 8.6 8.3 - 10.6 MG/DL    Albumin 2.9 (L) 3.4 - 5.0 GM/DL    Total Protein 8.7 (H) 6.4 - 8.2 GM/DL    Total Bilirubin 0.7 0.0 - 1.0 MG/DL    ALT 17 10 - 40 U/L    AST 35 15 - 37 IU/L    Alkaline Phosphatase 99 40 - 129 IU/L    GFR Non-African American >60 >60 mL/min/1.73m2    GFR African American >60 >60 mL/min/1.73m2    Anion Gap 13 4 - 16   Lactic Acid    Collection Time: 09/02/22  6:45 PM   Result Value Ref Range    Lactate 1.4 0.4 - 2.0 mMOL/L   Sedimentation Rate    Collection Time: 09/02/22  6:45 PM   Result Value Ref Range    Sed Rate >130 (H) 0 - 15 MM/HR   Vancomycin Level, Random    Collection Time: 09/03/22  7:45 AM   Result Value Ref Range    Vancomycin Rm 10.3 UG/ML    DOSE AMOUNT DOSE AMT. GIVEN - 2000 mg     DOSE TIME DOSE TIME GIVEN - 1953    POCT Glucose    Collection Time: 09/03/22  9:30 AM   Result Value Ref Range    POC Glucose 212 (H) 70 - 99 MG/DL   POCT Glucose    Collection Time: 09/03/22 12:46 PM   Result Value Ref Range    POC Glucose 296 (H) 70 - 99 MG/DL         Assessment:  Arun Parish is a 39 y.o. male who is post-op day #1 Day Post-Op 9/2/22 RIGHT LEG AMPUTATION BELOW KNEE      Principal Problem:    Necrotizing fasciitis ([address]  Active Problems:    Diabetic ulcer of toe of left foot associated with type 2 diabetes mellitus, with fat layer exposed ([address]    Sepsis ([address]    Type 2 diabetes mellitus, with long-term current use of insulin ([address]  Resolved Problems:    * No resolved hospital problems. *      Plan:  Discussed findings and options with Arun Parish. Amputation site looking better today, no signs of progressing necrotizing fasciitis post amputation. Continue wound vac. Tentatively will plan on return to the OR Monday for revision of the amputation from a guillotine BKA to a closed BKA.    Chronic diabetic wounds of left foot - will plan to debride when is right BKA is formalized  Continue empiric abx for now  PT/OT     Electronically signed: Tarik Stevens MD 9/3/2022 4:34 PM

## 2022-09-03 NOTE — PROGRESS NOTES
Two nurse skin assessment completed by Mirza Robin RN and myself. Patient has a new right below the knee amputation with a wound vac attached. Patient has two wounds on his left foot. One wound just bellow the 4th toe, approximately the size of a quarter is a stage 3. Patient has another wound patient reports is from the removal of his great toe. Wound is approximately 2.5-3 inches long, and a stage 3. Patient reports he sees the wound care center for these wounds. Mepilex placed over these wounds No other wounds noted. Consult to wound care team placed per Dr. Ella Appiah.

## 2022-09-03 NOTE — CONSULTS
[id number]    PATIENT: Dao Shukla, 1980, 39 y.o., male  MRN: [id number]    Physician: Juan Robert MD    Date: 9/2/22    Reason for Evaluation & Chief Complaint:    Chief Complaint   Patient presents with    Wound Check     Bilateral foot ulcers    Emesis     Requesting Kevin Jeffrey MD     History Obtained From:  patient, electronic medical record    HISTORY OF PRESENT ILLNESS:    Madeleine Wesley is a 39 y.o. male presenting with concern for worsening infection in his his right foot. He was discharged from the hospital earlier today, was previously admitted for pneumatosis of his colon and chronic diabetic foot wounds. He went home and a few hours later noted rapidly increasing redness and swelling in his right foot. It also has been having thin drainage which has increased. XR showed gas in the subcutaneous tissues of the dorsal foot concerning for a necrotizing infection.      Location: right foot  Quality, Severity: moderate  Pain is described as aching  Timing, Duration: 1 day  Context, Modifying Factors: as above  Associated Signs & Symptoms: drainage, erythema, nausea and vomiting    Workup Includes: XR foot  Of Note: he reports he had emesis after he got home     Past Medical History:    Past Medical History:   Diagnosis Date    Abscess of left foot 4/22/2022    Anemia associated with acute blood loss 4/26/2022    Callus of foot     Right foot - see's Dr. Jessy Wilde    Cellulitis of left foot 4/22/2022    Diabetic ulcer of left midfoot associated with type 2 diabetes mellitus, with muscle involvement without evidence of necrosis ([address] 4/26/2022    Diabetic ulcer of left midfoot associated with type 2 diabetes mellitus, with necrosis of muscle ([address] 6/7/2021    Diabetic ulcer of right midfoot associated with type 2 diabetes mellitus, with fat layer exposed ([address] 8/11/2020    Dizziness     positional    Essential hypertension Follows with PCP    Hyperlipidemia     Lumbar radiculopathy     Septic embolism ([facility] 6/13/2020    Subacute osteomyelitis of left foot ([facility] 4/22/2022       Past Surgical History:    Past Surgical History:   Procedure Laterality Date    ACHILLES TENDON SURGERY Right 1/8/2021    RIGHT ACHILLES TENDON LENGTHENING REPAIR performed by Mary Jo Madrigal DPM at [address]  03/2018 [facility]    DENTAL SURGERY      teeth extractions -half per patient    HERNIA REPAIR Bilateral 5/27/2020    BIALTERAL HERNIA INGUINAL REPAIR performed by Gladys Rodríguez MD at [facility] 2018    MA OFFICE/OUTPT VISIT,PROCEDURE ONLY Left 4/17/2018    L5-S1 HEMILAMINECTOMY, REMOVAL OF DISC LEFT SIDE performed by Linwood Carl MD at [address] Right 1/8/2021    RIGHT TRANSMETATARSAL TOE AMPUTATION performed by Mary Jo Madrigal DPM at [address] Left 4/23/2022    LEFT RAY GREAT TOE AMPUTATION performed by Ankita Welsh MD at [address] [facility]         Current Medications:   No current facility-administered medications for this encounter.      Current Outpatient Medications   Medication Sig Dispense Refill    metroNIDAZOLE (FLAGYL) 500 MG tablet Take 1 tablet by mouth 3 times daily for 28 days 84 tablet 0    pantoprazole (PROTONIX) 20 MG tablet Take 1 tablet by mouth every morning (before breakfast) 30 tablet 0    metFORMIN (GLUCOPHAGE) 500 MG tablet Take 2 tablets by mouth 2 times daily (with meals) Indications: Start tomorrow 03/14 360 tablet 1    ondansetron (ZOFRAN-ODT) 4 MG disintegrating tablet Take 1 tablet by mouth every 8 hours as needed for Nausea or Vomiting 14 tablet 0    sucralfate (CARAFATE) 1 GM/10ML suspension Take 10 mLs by mouth 4 times daily for 7 days 414 mL 0    glyBURIDE (DIABETA) 5 MG tablet Take 2 tablets by mouth in the morning and at bedtime 120 tablet 1    Dulaglutide 1.5 MG/0.5ML SOPN Inject 1.5 mg into the skin once a week 5 pen 3    metoprolol succinate (TOPROL XL) 25 MG extended release tablet Take 1 tablet by mouth in the morning. 90 tablet 1    losartan (COZAAR) 25 MG tablet Take 1 tablet by mouth in the morning. 90 tablet 1    FLUoxetine (PROZAC) 40 MG capsule Take 2 capsules by mouth in the morning. 30 capsule 3    insulin glargine (LANTUS SOLOSTAR) 100 UNIT/ML injection pen Inject 30 Units into the skin nightly 5 pen 2    collagenase 250 UNIT/GM ointment Per instructions DAILY (route: topical)      Alcohol Swabs PADS       pioglitazone (ACTOS) 30 MG tablet Take 1 tablet by mouth daily 90 tablet 1    atorvastatin (LIPITOR) 40 MG tablet Take 1 tablet by mouth nightly 90 tablet 1    blood glucose monitor strips Test 2 times a day & as needed for symptoms of irregular blood glucose. Dispense sufficient amount for indicated testing frequency plus additional to accommodate PRN testing needs. 100 strip 0    blood glucose monitor kit and supplies Dispense sufficient amount for indicated testing frequency plus additional to accommodate PRN testing needs. Dispense all needed supplies to include: monitor, strips, lancing device, lancets, control solutions, alcohol swabs. 1 kit 0    FreeStyle Lancets MISC 1 each by Does not apply route daily 100 each 3       Allergies:  Patient has no known allergies.     Social History:   Social History     Socioeconomic History    Marital status: Single     Spouse name: None    Number of children: None    Years of education: None    Highest education level: None   Tobacco Use    Smoking status: Never    Smokeless tobacco: Never   Vaping Use    Vaping Use: Never used   Substance and Sexual Activity    Alcohol use: Yes     Comment: \"couple beers a night\"    Drug use: No    Sexual activity: Yes     Partners: Female     Social Determinants of Health     Financial Resource Strain: Medium Risk    Difficulty of Paying Living Expenses: Somewhat hard   Food Insecurity: Food Insecurity Present Worried About [address] [employer] in the Last Year: Sometimes true    Margarita of Food in the Last Year: Sometimes true   Transportation Needs: No Transportation Needs    Lack of Transportation (Medical): No    Lack of Transportation (Non-Medical): No   Physical Activity: Inactive    Days of Exercise per Week: 0 days    Minutes of Exercise per Session: 0 min   Stress: Stress Concern Present    Feeling of Stress : Rather much   Social Connections: Unknown    Frequency of Communication with Friends and Family: Once a week    Attends Jainism Services: Never    Attends Club or Organization Meetings: Never    Marital Status: Never    Housing Stability: Low Risk     Unable to Pay for Housing in the Last Year: No    Number of Places Lived in the Last Year: 1    Unstable Housing in the Last Year: No       Family History:   Family History   Problem Relation Age of Onset    Heart Disease Father     Diabetes Father     Diabetes Mother     Heart Disease Mother     Other Mother     Kidney Disease Mother     Heart Attack Mother        REVIEW OF SYSTEMS:    Review of Systems   Constitutional:  Negative for chills and fever. HENT:  Negative for congestion and sore throat. Eyes:  Negative for discharge and redness. Respiratory:  Negative for chest tightness and shortness of breath. Cardiovascular:  Negative for chest pain and palpitations. Gastrointestinal:  Positive for nausea and vomiting. Genitourinary:  Negative for dysuria and flank pain. Musculoskeletal:  Positive for joint swelling and myalgias. Negative for arthralgias. Skin:  Positive for rash and wound. Negative for color change. Neurological:  Negative for dizziness and numbness. Psychiatric/Behavioral:  Negative for confusion. The patient is nervous/anxious. I have reviewed the patient's information pertinent to this visit, including medical history, family history, social history and review of systems.     PHYSICAL EXAM:    Vitals: 09/02/22 1758 09/02/22 1926 09/02/22 2100 09/02/22 2220   BP: (!) 157/83 (!) 153/80 (!) 169/80 (!) 167/90   Pulse: 100 (!) 105 96 97   Resp: 18 20 18 20   Temp: 98.2 °F (36.8 °C) 98.4 °F (36.9 °C)  98.5 °F (36.9 °C)   TempSrc:  Oral  Oral   SpO2: 99% 100% 100% 100%     Physical Exam  Constitutional:       General: He is not in acute distress. Appearance: He is well-developed. He is not diaphoretic. HENT:      Head: Normocephalic and atraumatic. Right Ear: External ear normal.      Left Ear: External ear normal.      Mouth/Throat:      Mouth: Mucous membranes are moist.   Eyes:      General:         Right eye: No discharge. Left eye: No discharge. Neck:      Trachea: No tracheal deviation. Cardiovascular:      Rate and Rhythm: Normal rate and regular rhythm. Pulmonary:      Effort: Pulmonary effort is normal. No respiratory distress. Breath sounds: No wheezing. Abdominal:      General: There is no distension. Palpations: Abdomen is soft. Tenderness: There is no abdominal tenderness. There is no guarding or rebound. Musculoskeletal:         General: No tenderness or deformity. Cervical back: Neck supple. Skin:     General: Skin is warm and dry. Findings: Erythema present. Comments: Previous right toe amputations   Neurological:      Mental Status: He is alert and oriented to person, place, and time.    Psychiatric:         Behavior: Behavior normal.       DATA:    Lab Results   Component Value Date    WBC 13.9 (H) 09/02/2022    HGB 12.1 (L) 09/02/2022    HCT 38.7 (L) 09/02/2022     09/02/2022     (L) 09/02/2022    K 3.9 09/02/2022    CL 95 (L) 09/02/2022    CO2 24 09/02/2022    BUN 6 09/02/2022    CREATININE 1.0 09/02/2022    GLUCOSE 143 (H) 09/02/2022    CALCIUM 8.6 09/02/2022    PROT 8.7 (H) 09/02/2022    BILITOT 0.7 09/02/2022    AST 35 09/02/2022    ALT 17 09/02/2022    ALKPHOS 99 09/02/2022    LIPASE 20 08/31/2022    INR 0.89 05/20/2022    LABA1C 10.5 07/28/2022       Imaging:   XR FOOT RIGHT (MIN 3 VIEWS)    Result Date: 9/2/2022  EXAMINATION: THREE XRAY VIEWS OF THE RIGHT FOOT 9/2/2022 7:18 pm COMPARISON: April 18, 2022 HISTORY: ORDERING SYSTEM PROVIDED HISTORY: redness, swelling TECHNOLOGIST PROVIDED HISTORY: Reason for exam:->redness, swelling Reason for Exam: redness, swelling Additional signs and symptoms: none Relevant Medical/Surgical History: diabetes FINDINGS: Soft tissue ulceration noted involving the medial aspect of the foot. There has been prior transmetatarsal amputation. Focal osteolysis involving the 1st metatarsal remnant, with associated comminuted, mildly displaced subacute fracture. There is intra-articular extension to the tarsometatarsal joint. Immature callus formation is noted. The amputation margins of the 2nd, 3rd, 4th and 5th metatarsals appears stable. The joint alignment is maintained. Soft tissue gas extends along the foot, with foci of soft tissue gas also noted involving the anterior soft tissues at the level of the distal tibia. Findings are consistent with pyogenic infection involving the soft tissues forefoot/midfoot, with small foci of gas tracking along the dorsal aspect of the foot to the anterior soft tissues in the region the lower tibia. The extent of soft tissue infection could be further evaluated by CT or MRI if clinically indicated. There has been transmetatarsal amputation of the forefoot. Acute osteomyelitis is noted involving the 2nd metatarsal remnant. Additionally, there is a subacute appearing, comminuted and displaced intra-articular pathologic fracture involving the 1st metatarsal remnant.      CT ABDOMEN PELVIS W IV CONTRAST Additional Contrast? None    Result Date: 9/1/2022  EXAMINATION: CT OF THE ABDOMEN AND PELVIS WITH CONTRAST 8/31/2022 11:56 pm TECHNIQUE: CT of the abdomen and pelvis was performed with the administration of intravenous contrast. Multiplanar reformatted images are provided for review. Automated exposure control, iterative reconstruction, and/or weight based adjustment of the mA/kV was utilized to reduce the radiation dose to as low as reasonably achievable. COMPARISON: 08/28/2022 and 06/12/2021 HISTORY: ORDERING SYSTEM PROVIDED HISTORY: Abdominal pain TECHNOLOGIST PROVIDED HISTORY: Reason for exam:->Abdominal pain Additional Contrast?->None Decision Support Exception - unselect if not a suspected or confirmed emergency medical condition->Emergency Medical Condition (MA) Reason for Exam: Abdominal Pain; Emesis; Nausea FINDINGS: Lower Chest: Has dependent atelectasis in the lung bases but no consolidation or pleural effusion. Heart is not enlarged. Organs: No mass or nodularity is seen within the liver. No mineralized stones in the gallbladder and the biliary system is not dilated. The spleen is not enlarged. No pancreatic calcification, ductal dilatation, or surrounding fluid collection. The adrenal glands are unremarkable. Kidneys are enhancing equally without acute cortical defect. Mild perinephric stranding is present but relatively symmetric. May have a few punctate calculi in the right kidney but no hydronephrosis or hydroureter. No calculi in the distal ureter. Left kidney has several small to moderate calculi with the larger measuring up to 5 mm. There is no left-sided hydronephrosis or ureteral calculus. GI/Bowel: The stomach, small bowel, and colon are not dilated. Stomach is under distended and gastritis is not excluded. The appendix is identified and no appendicitis. There is some pneumatosis at the cecum and ascending colon with fluid in the lumen. .  The colonic wall is not thickened and there is no focal surrounding inflammation. Transverse and distal colon are collapsed without wall thickening or pneumatosis. There is no significant rectal fecal bolus. There is no portal venous gas or pneumoperitoneum.  Pelvis: Urinary bladder wall is not thickened. Prostate is not enlarged. No free fluid in the pelvis. Peritoneum/Retroperitoneum: No abdominal aortic aneurysm or retroperitoneal hematoma. Several borderline lymph nodes are present along the periaortic and pericaval chain are mildly increased from the old exam.. Bones/Soft Tissues: No acute fracture or destruction at the included bones. Old deformity of upper endplate of L1. No fluid collection in the abdominal wall. There is fluid in the lumen at the cecum and ascending colon. There is pneumatosis along the wall of the cecum to ascending colon without wall thickening or surrounding pericolonic inflammation. No appendicitis. The remaining colon is collapsed. The pneumatosis and fluid are nonspecific but may relate to a colitis and diarrhea. There is no portal venous gas or pneumoperitoneum. Mildly increased lymph nodes in the periaortic and pericaval chain compared with 06/12/2021. These may be post infectious/inflammatory. CTA LOWER EXTREMITY RIGHT W CONTRAST    Result Date: 9/2/2022  EXAMINATION: CTA OF THE RIGHT LOWER EXTREMITY 9/2/2022 9:03 pm TECHNIQUE: CTA of the right lower extremity was performed after the administration of intravenous contrast. Multiplanar reformatted images are provided for review. MIP images are provided for review. . Automated exposure control, iterative reconstruction, and/or weight based adjustment of the mA/kV was utilized to reduce the radiation dose to as low as reasonably achievable. COMPARISON: None. HISTORY ORDERING SYSTEM PROVIDED HISTORY: cellulitis, gas in foot, ?osteo TECHNOLOGIST PROVIDED HISTORY: Reason for exam:->cellulitis, gas in foot, ?osteo Reason for Exam: cellulitis, gas in foot, ?osteo FINDINGS: Bones: Prior transmetatarsal amputation of the 1st through 5th digits. There is osseous erosion and destruction with cortical irregularity in the remaining great toe metatarsal.  Degenerative changes are noted in the midfoot.  Soft Tissue: There is diffuse subcutaneous edema and fluid in the calf soft tissues. There is associated skin thickening. There is an irregular rim enhancing gas and fluid containing collection in the anterior ankle soft tissues extending into the foot, which measures approximately 2.4 x 1.7 cm. There is additional soft tissue gas adjacent to the great toe. There is ulceration in the great toe soft tissues. There are mildly enlarged popliteal lymph nodes. There are enlarged bilateral inguinal lymph nodes with the right measuring up to 3 x 2.4 cm. Visualized bowel loops are nondilated. Bladder is partially distended. Vessels: The SFA is patent with scattered atherosclerosis. The popliteal artery is patent with scattered atherosclerosis. Evaluation of the calf vasculature is mildly limited due to contrast timing. The PT trunk is patent. The anterior tibial posterior tibial, and peroneal arteries appear patent to the foot. 1. Prior 1st through 5th transmetatarsal amputation. There is osseous erosion and destruction with cortical destruction in the remaining great toe metatarsal, concerning for osteomyelitis. 2. Soft tissue edema and emphysema with rim enhancing fluid collection in the anterior ankle and extending along the great toe metatarsal, consistent with abscess. 3. Atherosclerosis throughout the right lower extremity vasculature, which appears patent. Pertinent laboratory and imaging studies were personally reviewed if available.     IMPRESSION:    Clarisse Monroy is a 39 y.o. male with history of diabetic foot ulcers, recent hospital discharge for pneumatosis coli, now presenting with acute worsening of his right foot wound concerning for necrotizing fasciitis    Patient Active Problem List    Diagnosis Date Noted    Intractable nausea and vomiting 09/01/2022    Intractable vomiting with nausea 08/28/2022    Insomnia 07/01/2022    Severe episode of recurrent major depressive disorder, without psychotic features ([address] 06/30/2022    WD-Diabetic ulcer of left midfoot associated with type 2 diabetes mellitus, with fat layer exposed ([address] 06/15/2022    WD-Diabetic ulcer of right midfoot associated with type 2 diabetes mellitus, with fat layer exposed ([address] 06/15/2022    WD-Partial nontraumatic amputation of foot, left ([address] 06/15/2022    Anxiety attack 05/21/2022    Anemia associated with acute blood loss 04/26/2022    Abscess of left foot 04/22/2022    Cellulitis of left foot 04/22/2022    Subacute osteomyelitis of left foot ([address] 04/22/2022    Acute osteomyelitis ([address] 04/18/2022    Acute osteomyelitis of foot (HCC)     Moderate nonproliferative diabetic retinopathy of both eyes without macular edema associated with type 2 diabetes mellitus ([address] 01/10/2022    Chest pain 03/12/2021    Gastroesophageal reflux disease without esophagitis 05/11/2020    Type 2 diabetes mellitus with diabetic neuropathy, without long-term current use of insulin (HCC)     Essential hypertension     S/P lumbar laminectomy 04/18/2018    Lumbar back pain with radiculopathy affecting left lower extremity 04/17/2018    Unstable angina ([address] 03/13/2018     Visit Diagnoses:  No diagnosis found. PLAN:  Discussed findings and options with Chapo Costello. His CT scan shows abscess and gas in the subcutaneous tissues, clinically there is concern for necrotizing fasciitis due to the degree of erythema and the rapid progression of his wound from earlier today. We discussed options, he wishes to proceed with guillotine right below knee amputation and possible wound vac emergently today. The risks, benefits, potential complications and possible alternatives of the procedure were discussed in detail, including complications of but not limited to infection, bleeding, anesthesia-related complications, death, need for further surgery or amputations, or need for additional interventions. All questions were answered.  The patient wished to proceed and consent was documented in the medical record. NPO for surgery  Empiric abx for necrotizing fasciitis  Nausea and vomiting - was discharged earlier today after being evaluated for pneumatosis.  Monitor his abdominal exams, will consider re-imaging his abdomen if symptoms continue  Thank you for the consultation and the opportunity to care for Caden Magdaleno    Electronically signed by Dilcia Nix MD, 9/2/2022, 10:27 PM

## 2022-09-03 NOTE — CONSULTS
I have personally seen and examined the patient independently. I have reviewed the patient's available data,including medical history and recent test results. Reviewed and discussed note as documented by ETTA. I agree with the physical exam findings, assessment and plan. My documented MDM is a substantive portion of the supervisory note. Patient is lying in the bed. He had Right LE amputation for the necrotizing fasciitis. He is lying in the bed. He is in mild resp distress.     ASSESSMENT    Right LE necrotizing fasciitis s/p amputation  DM II  HTN  HLD  Depression          PLAN  Cefepime , Vanco and Clindamycin  F/u C&S  Wound vac and care  Insulin  Keep sats > 92%  DVT and GI Prophylaxis  PT/OT  C/w present management            V2.0  USACS Consult Note      Name:  Estrella Lantigua /Age/Sex: 1980  (39 y.o. male)   MRN & CSN:  [id number] & [id number] Encounter Date/Time: 9/3/2022 11:52 AM EDT   Location:  -A PCP: RAFAEL Betts MD  Consulting Provider: John Martin PA-C      Hospital Day: 2    Assessment and Recommendations   Estrella Lantigua is a 39 y.o. male with pmh of hypertension, diabetes mellitus type 2, complicated by diabetic neuropathy, diabetic foot ulcer requiring amputation, hyperlipidemia, anxiety/depression who presents with Necrotizing fasciitis [facility])      Hospital Problems             Last Modified POA    * (Principal) Necrotizing fasciitis ([address] 9/3/2022 Yes    Diabetic ulcer of toe of left foot associated with type 2 diabetes mellitus, with fat layer exposed ([address] 9/3/2022 Yes    Sepsis ([address] 9/3/2022 Yes    Type 2 diabetes mellitus, with long-term current use of insulin ([address] 9/3/2022 Yes       Recommendations:    Right lower extremity necrotizing fasciitis S/P below-knee amputation-  S/t Chronic complicated diabetic foot ulcer associated with edema, abscess and drainage  CTA-right lower extremity-reviewed  Continue cefepime, clindamycin, vancomycin  Surgical path report, blood cultures-pending  General surgery following    Sepsis s/t necrotizing fasciitis of right lower extremity- POA  Continue vancomycin, cefepime, clindamycin  Wound care consulted  Mild leukocytosis, afebrile, follow-up culture report    Poorly controlled diabetes mellitus type 2 complicated with diabetic neuropathy, diabetic foot ulcers  HbA1c 10.5 in July  Require strict blood sugar control for better healing-communicated this with patient  Continue SSI, Lantus 30 units nightly, optimize as needed to maintain BS level and euglycemic range    Pneumatosis coli-presented with nausea vomiting and abdominal pain on 8/31, treated with Cipro/Flagyl-discharged on 9/2 in stable condition    Chronic conditions:  HTN-hold antihypertensives for soft pressures  Depression-continue fluoxetine  HLD-continue statin    Diet ADULT DIET; Regular; 4 carb choices (60 gm/meal)   DVT Prophylaxis [x] Lovenox, []  Heparin, [] SCDs, [] Ambulation,  [] Eliquis, [] Xarelto   Code Status Full Code   Surrogate Decision Maker/ POA Unknown     History Obtained From:    patient, electronic medical record    History of Present Illness:     Chief Complaint: Necrotizing fasciitis ([address]    Turner Monday is a 39 y.o. male who is seen today by the critical care team at the request of hospitalist, with a Chief Complaint of necrotizing fasciitis  Patient had a recent hospitalization for abdominal pain, nausea, vomiting found to have pneumatosis coli, was on Flagyl and ciprofloxacin discharged home in stable condition on 9/2 at 4 PM, he came back at 7 PM with right lower extremity pain, erythema and clear discharge. CTA rt lower extremity concerning for necrotizing fasciitis and was immediately taken for right BKA. Critical care team was consulted this morning for medical management while patient is in ICU.   Patient is alert oriented x4, hemodynamically stable, complaining of mild postsurgical pain.    Review of Systems:    Review of Systems  Denies SOB, chest pain, palpitations, nausea/vomiting/abdominal pain, lightheadedness, vision changes motor or sensory changes    Objective: Intake/Output Summary (Last 24 hours) at 9/3/2022 1152  Last data filed at 9/3/2022 1113  Gross per 24 hour   Intake 1217.88 ml   Output 530 ml   Net 687.88 ml      Vitals:   Vitals:    09/03/22 0721 09/03/22 0751 09/03/22 0807 09/03/22 0857   BP:    (!) 151/86   Pulse:   68 76   Resp: 20 20 18 21   Temp:    97.5 °F (36.4 °C)   TempSrc:    Oral   SpO2:   98%        Medications Prior to Admission     Prior to Admission medications    Medication Sig Start Date End Date Taking?  Authorizing Provider   metroNIDAZOLE (FLAGYL) 500 MG tablet Take 1 tablet by mouth 3 times daily for 28 days 9/2/22 9/30/22  Faustino Ordonez, MD   pantoprazole (PROTONIX) 20 MG tablet Take 1 tablet by mouth every morning (before breakfast) 9/2/22   Faustino Daily, MD   metFORMIN (GLUCOPHAGE) 500 MG tablet Take 2 tablets by mouth 2 times daily (with meals) Indications: Start tomorrow 03/14 9/2/22 3/1/23  Faustino Ordonez, MD   ondansetron (ZOFRAN-ODT) 4 MG disintegrating tablet Take 1 tablet by mouth every 8 hours as needed for Nausea or Vomiting 8/29/22   RAFAEL Liz CNP   metoclopramide (REGLAN) 10 MG tablet Take 1 tablet by mouth 4 times daily (before meals and nightly) 8/29/22 9/2/22  RAFAEL Liz CNP   sucralfate (CARAFATE) 1 GM/10ML suspension Take 10 mLs by mouth 4 times daily for 7 days 8/18/22 8/25/22  RAFAEL Arceo CNP   glyBURIDE (DIABETA) 5 MG tablet Take 2 tablets by mouth in the morning and at bedtime 8/1/22   RAFAEL Arceo CNP   Dulaglutide 1.5 MG/0.5ML SOPN Inject 1.5 mg into the skin once a week 8/1/22   RAFAEL Arceo CNP   metoprolol succinate (TOPROL XL) 25 MG extended release tablet Take 1 tablet by mouth in the morning. 7/29/22   RAFAEL Arceo CNP   losartan (COZAAR) 25 MG tablet Take 1 tablet by mouth in the morning. 7/29/22 10/27/22  RAFAEL Perez CNP   omeprazole (PRILOSEC) 20 MG delayed release capsule Take 1 capsule by mouth once daily in the morning 7/29/22 9/2/22  RAFAEL Perez CNP   FLUoxetine (PROZAC) 40 MG capsule Take 2 capsules by mouth in the morning. 7/28/22 8/27/22  RAFAEL Perez CNP   insulin glargine (LANTUS SOLOSTAR) 100 UNIT/ML injection pen Inject 30 Units into the skin nightly 6/30/22 9/28/22  Brianne Martinez MD   collagenase 250 UNIT/GM ointment Per instructions DAILY (route: topical) 5/23/22   Historical Provider, MD   Alcohol Swabs PADS  4/27/22   Historical Provider, MD   pioglitazone (ACTOS) 30 MG tablet Take 1 tablet by mouth daily 4/27/22 8/18/22  Soumya Dillon MD   atorvastatin (LIPITOR) 40 MG tablet Take 1 tablet by mouth nightly 4/27/22 8/18/22  Soumya Dillon MD   blood glucose monitor strips Test 2 times a day & as needed for symptoms of irregular blood glucose. Dispense sufficient amount for indicated testing frequency plus additional to accommodate PRN testing needs. 4/27/22   Soumya Dillon MD   blood glucose monitor kit and supplies Dispense sufficient amount for indicated testing frequency plus additional to accommodate PRN testing needs. Dispense all needed supplies to include: monitor, strips, lancing device, lancets, control solutions, alcohol swabs.  6/15/20   Geraldo Romero MD   FreeStyle Lancets MISC 1 each by Does not apply route daily 6/15/20   Geraldo Romero MD   acetaminophen (TYLENOL) 325 MG tablet Take 2 tablets by mouth every 4 hours as needed for Pain or Fever 2/28/18 9/2/22  More Norton MD       Physical Exam: Need 8 Elements   General: Patient is a 28-year-old male , appears older than stated age, NAD  Eyes: EOMI  ENT: neck supple  Cardiovascular: Regular rate, sinus rhythm  Respiratory: Clear to auscultation  Gastrointestinal: Soft, non tender  Genitourinary: no suprapubic tenderness  Musculoskeletal: rt BKA, LLE diabetic foot ulcers  Skin: warm, dry  Neuro: Alert and oriented x4, no lateralizing weakness  Psych: Mood appropriate. Past Medical History:   PMHx   Past Medical History:   Diagnosis Date    Abscess of left foot 4/22/2022    Anemia associated with acute blood loss 4/26/2022    Callus of foot     Right foot - see's Dr. Peggy Peres    Cellulitis of left foot 4/22/2022    Diabetic ulcer of left midfoot associated with type 2 diabetes mellitus, with muscle involvement without evidence of necrosis ([address] 4/26/2022    Diabetic ulcer of left midfoot associated with type 2 diabetes mellitus, with necrosis of muscle ([address] 6/7/2021    Diabetic ulcer of right midfoot associated with type 2 diabetes mellitus, with fat layer exposed ([address] 8/11/2020    Dizziness     positional    Essential hypertension     Follows with PCP    Hyperlipidemia     Lumbar radiculopathy     Septic embolism ([address] 6/13/2020    Subacute osteomyelitis of left foot ([address] 4/22/2022     PSHX:  has a past surgical history that includes Cardiac catheterization (03/2018); [place] tooth extraction; Dental surgery; pr office/outpt visit,procedure only (Left, 4/17/2018); lumbar laminectomy (2018); Toe amputation (Right, 1/8/2021); Achilles tendon surgery (Right, 1/8/2021); hernia repair (Bilateral, 5/27/2020); and Toe amputation (Left, 4/23/2022). Allergies: No Known Allergies  Fam HX: family history includes Diabetes in his father and mother; Heart Attack in his mother; Heart Disease in his father and mother; Kidney Disease in his mother; Other in his mother.   Soc HX:   Social History     Socioeconomic History    Marital status: Single     Spouse name: None    Number of children: None    Years of education: None    Highest education level: None   Tobacco Use    Smoking status: Never    Smokeless tobacco: Never   Vaping Use    Vaping Use: Never used   Substance and Sexual Activity    Alcohol use: Yes     Comment: \"couple beers a night\"    Drug use: No    Sexual activity: Yes     Partners: Female     Social Determinants of Health     Financial Resource Strain: Medium Risk    Difficulty of Paying Living Expenses: Somewhat hard   Food Insecurity: Food Insecurity Present    Worried About [address] in the Last Year: Sometimes true    Ran Out of Food in the Last Year: Sometimes true   Transportation Needs: No Transportation Needs    Lack of Transportation (Medical): No    Lack of Transportation (Non-Medical):  No   Physical Activity: Inactive    Days of Exercise per Week: 0 days    Minutes of Exercise per Session: 0 min   Stress: Stress Concern Present    Feeling of Stress : Rather much   Social Connections: Unknown    Frequency of Communication with Friends and Family: Once a week    Attends Restorationist Services: Never    Attends Club or Organization Meetings: Never    Marital Status: Never    Housing Stability: Low Risk     Unable to Pay for Housing in the Last Year: No    Number of Places Lived in the Last Year: 1    Unstable Housing in the Last Year: No       Medications:   Medications:    sodium chloride flush  5-40 mL IntraVENous 2 times per day    sodium chloride flush  5-40 mL IntraVENous 2 times per day    [START ON 9/4/2022] enoxaparin  40 mg SubCUTAneous Daily    cefepime  2,000 mg IntraVENous Q12H    clindamycin (CLEOCIN) IV  900 mg IntraVENous Q8H    vancomycin  1,000 mg IntraVENous Q12H    docusate sodium  100 mg Oral Daily    atorvastatin  40 mg Oral Nightly    FLUoxetine  80 mg Oral Daily    [Held by provider] losartan  25 mg Oral Daily    [Held by provider] metoprolol succinate  25 mg Oral Daily    pantoprazole  20 mg Oral QAM AC    sucralfate  1 g Oral 2 times per day    insulin lispro  0-8 Units SubCUTAneous TID WC    insulin lispro  0-4 Units SubCUTAneous Nightly      Infusions:    sodium chloride      sodium chloride 20 mL/hr at 09/03/22 0914    dextrose       PRN Meds: sodium chloride flush, 5-40 mL, PRN  sodium chloride, 25 mL, PRN  fentanNYL, 25 mcg, Q5 Min PRN  HYDROmorphone, 0.5 mg, Q5 Min PRN  oxyCODONE, 5 mg, PRN   Or  oxyCODONE, 10 mg, PRN  ondansetron, 4 mg, Once PRN  droperidol, 0.625 mg, Once PRN  labetalol, 10 mg, Q15 Min PRN   Or  hydrALAZINE, 10 mg, Q15 Min PRN  ipratropium-albuterol, 1 ampule, Once PRN  sodium chloride flush, 5-40 mL, PRN  sodium chloride, , PRN  ondansetron, 4 mg, Q8H PRN   Or  ondansetron, 4 mg, Q6H PRN  oxyCODONE, 5 mg, Q4H PRN   Or  oxyCODONE, 10 mg, Q4H PRN  morphine, 1 mg, Q2H PRN   Or  morphine, 2 mg, Q2H PRN  glucose, 4 tablet, PRN  dextrose bolus, 125 mL, PRN   Or  dextrose bolus, 250 mL, PRN  glucagon (rDNA), 1 mg, PRN  dextrose, , Continuous PRN        Labs and Imaging   XR FOOT RIGHT (MIN 3 VIEWS)    Result Date: 9/2/2022  EXAMINATION: THREE XRAY VIEWS OF THE RIGHT FOOT 9/2/2022 7:18 pm COMPARISON: April 18, 2022 HISTORY: ORDERING SYSTEM PROVIDED HISTORY: redness, swelling TECHNOLOGIST PROVIDED HISTORY: Reason for exam:->redness, swelling Reason for Exam: redness, swelling Additional signs and symptoms: none Relevant Medical/Surgical History: diabetes FINDINGS: Soft tissue ulceration noted involving the medial aspect of the foot. There has been prior transmetatarsal amputation. Focal osteolysis involving the 1st metatarsal remnant, with associated comminuted, mildly displaced subacute fracture. There is intra-articular extension to the tarsometatarsal joint. Immature callus formation is noted. The amputation margins of the 2nd, 3rd, 4th and 5th metatarsals appears stable. The joint alignment is maintained. Soft tissue gas extends along the foot, with foci of soft tissue gas also noted involving the anterior soft tissues at the level of the distal tibia. Findings are consistent with pyogenic infection involving the soft tissues forefoot/midfoot, with small foci of gas tracking along the dorsal aspect of the foot to the anterior soft tissues in the region the lower tibia.   The extent of soft tissue infection could be further evaluated by CT or MRI if clinically indicated. There has been transmetatarsal amputation of the forefoot. Acute osteomyelitis is noted involving the 2nd metatarsal remnant. Additionally, there is a subacute appearing, comminuted and displaced intra-articular pathologic fracture involving the 1st metatarsal remnant. CT ABDOMEN PELVIS W IV CONTRAST Additional Contrast? None    Result Date: 9/1/2022  EXAMINATION: CT OF THE ABDOMEN AND PELVIS WITH CONTRAST 8/31/2022 11:56 pm TECHNIQUE: CT of the abdomen and pelvis was performed with the administration of intravenous contrast. Multiplanar reformatted images are provided for review. Automated exposure control, iterative reconstruction, and/or weight based adjustment of the mA/kV was utilized to reduce the radiation dose to as low as reasonably achievable. COMPARISON: 08/28/2022 and 06/12/2021 HISTORY: ORDERING SYSTEM PROVIDED HISTORY: Abdominal pain TECHNOLOGIST PROVIDED HISTORY: Reason for exam:->Abdominal pain Additional Contrast?->None Decision Support Exception - unselect if not a suspected or confirmed emergency medical condition->Emergency Medical Condition (MA) Reason for Exam: Abdominal Pain; Emesis; Nausea FINDINGS: Lower Chest: Has dependent atelectasis in the lung bases but no consolidation or pleural effusion. Heart is not enlarged. Organs: No mass or nodularity is seen within the liver. No mineralized stones in the gallbladder and the biliary system is not dilated. The spleen is not enlarged. No pancreatic calcification, ductal dilatation, or surrounding fluid collection. The adrenal glands are unremarkable. Kidneys are enhancing equally without acute cortical defect. Mild perinephric stranding is present but relatively symmetric. May have a few punctate calculi in the right kidney but no hydronephrosis or hydroureter. No calculi in the distal ureter.   Left kidney has several small to moderate calculi with the larger measuring up to 5 mm. There is no left-sided hydronephrosis or ureteral calculus. GI/Bowel: The stomach, small bowel, and colon are not dilated. Stomach is under distended and gastritis is not excluded. The appendix is identified and no appendicitis. There is some pneumatosis at the cecum and ascending colon with fluid in the lumen. .  The colonic wall is not thickened and there is no focal surrounding inflammation. Transverse and distal colon are collapsed without wall thickening or pneumatosis. There is no significant rectal fecal bolus. There is no portal venous gas or pneumoperitoneum. Pelvis: Urinary bladder wall is not thickened. Prostate is not enlarged. No free fluid in the pelvis. Peritoneum/Retroperitoneum: No abdominal aortic aneurysm or retroperitoneal hematoma. Several borderline lymph nodes are present along the periaortic and pericaval chain are mildly increased from the old exam.. Bones/Soft Tissues: No acute fracture or destruction at the included bones. Old deformity of upper endplate of L1. No fluid collection in the abdominal wall. There is fluid in the lumen at the cecum and ascending colon. There is pneumatosis along the wall of the cecum to ascending colon without wall thickening or surrounding pericolonic inflammation. No appendicitis. The remaining colon is collapsed. The pneumatosis and fluid are nonspecific but may relate to a colitis and diarrhea. There is no portal venous gas or pneumoperitoneum. Mildly increased lymph nodes in the periaortic and pericaval chain compared with 06/12/2021. These may be post infectious/inflammatory. CT ABDOMEN PELVIS W IV CONTRAST Additional Contrast? None    Result Date: 8/28/2022  EXAMINATION: CT OF THE ABDOMEN AND PELVIS WITH CONTRAST 8/28/2022 3:28 am TECHNIQUE: CT of the abdomen and pelvis was performed with the administration of intravenous contrast. Multiplanar reformatted images are provided for review.  Automated exposure control, iterative reconstruction, and/or weight based adjustment of the mA/kV was utilized to reduce the radiation dose to as low as reasonably achievable. COMPARISON: 06/12/2021 HISTORY: ORDERING SYSTEM PROVIDED HISTORY: Vomiting TECHNOLOGIST PROVIDED HISTORY: Reason for exam:->Vomiting Additional Contrast?->None Decision Support Exception - unselect if not a suspected or confirmed emergency medical condition->Emergency Medical Condition (MA) Reason for Exam: Emesis FINDINGS: Lower Chest: No cardiomegaly. No distal esophageal thickening is identified. No parenchymal infiltrate. Organs: Diffuse hepatic steatosis. The gallbladder, adrenal glands, and pancreas appear unremarkable. No enhancing renal mass. Nonobstructive left renal calculi. No ureteral calculi are identified. No periureteral stranding is seen. GI/Bowel: Visualized loops of bowel appear normal in caliber. No ileus or obstruction. No appendicitis or diverticulitis is identified. Pelvis: No pelvic mass. Bladder appears unremarkable. No free fluid in the pelvis. No bulky pelvic lymphadenopathy is identified. Peritoneum/Retroperitoneum: No abdominal aortic aneurysm. No dissection. No retroperitoneal or mesenteric lymphadenopathy is identified. Hernia plug repair noted bilaterally within the inguinal canal region. Bones/Soft Tissues: Prominent inguinal lymph nodes noted bilaterally, though smaller on the left than compared to the previous examination. The lymph node on the right is mildly increased in size. These may be reactive. 1. No ileus or obstruction is identified. No acute inflammatory bowel wall thickening is seen. 2. Mild fatty infiltration seen within the liver. 3. Nonobstructive left renal calculi.      CTA LOWER EXTREMITY RIGHT W CONTRAST    Result Date: 9/2/2022  EXAMINATION: CTA OF THE RIGHT LOWER EXTREMITY 9/2/2022 9:03 pm TECHNIQUE: CTA of the right lower extremity was performed after the administration of intravenous contrast. Multiplanar reformatted images are provided for review. MIP images are provided for review. . Automated exposure control, iterative reconstruction, and/or weight based adjustment of the mA/kV was utilized to reduce the radiation dose to as low as reasonably achievable. COMPARISON: None. HISTORY ORDERING SYSTEM PROVIDED HISTORY: cellulitis, gas in foot, ?osteo TECHNOLOGIST PROVIDED HISTORY: Reason for exam:->cellulitis, gas in foot, ?osteo Reason for Exam: cellulitis, gas in foot, ?osteo FINDINGS: Bones: Prior transmetatarsal amputation of the 1st through 5th digits. There is osseous erosion and destruction with cortical irregularity in the remaining great toe metatarsal.  Degenerative changes are noted in the midfoot. Soft Tissue: There is diffuse subcutaneous edema and fluid in the calf soft tissues. There is associated skin thickening. There is an irregular rim enhancing gas and fluid containing collection in the anterior ankle soft tissues extending into the foot, which measures approximately 2.4 x 1.7 cm. There is additional soft tissue gas adjacent to the great toe. There is ulceration in the great toe soft tissues. There are mildly enlarged popliteal lymph nodes. There are enlarged bilateral inguinal lymph nodes with the right measuring up to 3 x 2.4 cm. Visualized bowel loops are nondilated. Bladder is partially distended. Vessels: The SFA is patent with scattered atherosclerosis. The popliteal artery is patent with scattered atherosclerosis. Evaluation of the calf vasculature is mildly limited due to contrast timing. The PT trunk is patent. The anterior tibial posterior tibial, and peroneal arteries appear patent to the foot. 1. Prior 1st through 5th transmetatarsal amputation. There is osseous erosion and destruction with cortical destruction in the remaining great toe metatarsal, concerning for osteomyelitis.  2. Soft tissue edema and emphysema with rim enhancing fluid collection in the anterior ankle and extending along the great toe metatarsal, consistent with abscess. 3. Atherosclerosis throughout the right lower extremity vasculature, which appears patent. CBC:   Recent Labs     09/01/22 0600 09/02/22  1212 09/02/22  1845   WBC 13.2* 11.1* 13.9*   HGB 10.8* 10.9* 12.1*    390 413     BMP:    Recent Labs     08/31/22 2130 08/31/22 2327 09/01/22  0600 09/02/22  1845   *  --  131* 132*   K 6.2* 3.9 4.0 3.9   CL 92*  --  100 95*   CO2 23  --  25 24   BUN 11  --  10 6   CREATININE 1.0  --  0.9 1.0   GLUCOSE 233*  --  200* 143*     Hepatic:   Recent Labs     08/31/22 2130 09/02/22  1845   AST 77* 35   ALT 20 17   BILITOT 1.1* 0.7   ALKPHOS 125 99     Lipids:   Lab Results   Component Value Date/Time    CHOL 138 07/06/2022 12:39 AM    CHOL 142 03/12/2020 08:26 AM    HDL 33 07/06/2022 12:39 AM    TRIG 294 07/06/2022 12:39 AM     Hemoglobin A1C:   Lab Results   Component Value Date/Time    LABA1C 10.5 07/28/2022 10:45 AM     TSH: No results found for: TSH  Troponin:   Lab Results   Component Value Date/Time    TROPONINT <0.010 05/21/2022 03:47 PM    TROPONINT <0.010 05/21/2022 09:40 AM    TROPONINT <0.010 05/20/2022 11:04 PM     Lactic Acid: No results for input(s): LACTA in the last 72 hours. BNP: No results for input(s): PROBNP in the last 72 hours.   UA:  Lab Results   Component Value Date/Time    NITRU NEGATIVE 07/06/2022 01:48 AM    COLORU YELLOW 07/06/2022 01:48 AM    WBCUA <1 07/06/2022 01:48 AM    RBCUA <1 07/06/2022 01:48 AM    MUCUS RARE 04/18/2022 03:01 PM    TRICHOMONAS NONE SEEN 07/06/2022 01:48 AM    BACTERIA NEGATIVE 07/06/2022 01:48 AM    CLARITYU CLEAR 07/06/2022 01:48 AM    SPECGRAV <1.005 07/06/2022 01:48 AM    LEUKOCYTESUR NEGATIVE 07/06/2022 01:48 AM    UROBILINOGEN 0.2 07/06/2022 01:48 AM    BILIRUBINUR NEGATIVE 07/06/2022 01:48 AM    BLOODU SMALL NUMBER OR AMOUNT OBSERVED 07/06/2022 01:48 AM    KETUA NEGATIVE 07/06/2022 01:48 AM     Urine Cultures: No results found for: LABURIN  Blood Cultures: No results found for: BC  No results found for: BLOODCULT2  Organism: No results found for: [facility]    Personally reviewed Lab Studies, Imaging, and discussed with Dr. Amber Shay    Electronically signed by Florence Cole PA-C on 9/3/2022 at 11:52 AM

## 2022-09-03 NOTE — OP NOTE
GENERAL SURGERY OPERATIVE NOTE  [facility]) Physicians    PATIENT: Dellie Goldberg 1980   MRN: [id number]    DATE: 9/3/2022    SURGEON: Edward Jain MD    CASE ID: [id number]     PROCEDURE(S) PERFORMED:      LEG AMPUTATION BELOW KNEE: 87542 (CPT®) - RIGHT  WOUND VAC PLACEMENT    PREOPERATIVE DIAGNOSIS:  necrotizing fasciitis    POSTOPERATIVE DIAGNOSIS:   same    INDICATIONS: Dellie Goldberg is a 39 y.o. male presenting with acute deterioration of a chronic diabetic foot ulcer with concern for necrotizing fasciitis for which right below the knee amputation with wound vac placement has been discussed. The risks, benefits, potential complications and possible alternatives of the procedure were discussed in detail, including complications of but not limited to infection, bleeding, anesthesia-related complications, death, injury to surrounding structures, pain, need for further amputation or surgery, or need for additional interventions. All questions were answered. The patient wished to proceed and consent was documented in the medical record. FINDINGS:   Diabetic foot wound with associated edema and abscess, drainage consistent with necrotizing fasciitis    ANESTHESIA:   General endotracheal anesthesia  Anesthesiologist: Angel Cárdenas MD  CRNA: RAFAEL Hutchinson CRNA    STAFF:   Scrub Person First: Alex Chaidez RN    First Assistant: RAFAEL Danielle-SERENA   The use of a first assistant was necessary for the proper positioning, prepping, and draping of the patient, as well as the safe and expeditious execution of the case and closure of skin and subcutaneous tissues.      ESTIMATED BLOOD LOSS: Minimal    SPECIMEN(S):   ID Type Source Tests Collected by Time Destination   A : Right Below the Knee Amputation Specimen Leg SURGICAL PATHOLOGY Juice Merino MD 9/2/2022 2787        DRAINS & IMPLANTS:  * No implants in log *     COMPLICATIONS: none    DISPOSITION: ICU - hemodynamically stable. CONDITION: stable     PROCEDURE IN DETAIL:  Prior to beginning the procedure, informed consent was obtained and consent was documented in the medical record. The patient was brought to the operating room and positioned supine on the operating table. Anesthesia was initiated and a time out was performed in which all were in agreement. Appropriate perioperative antibiotics were administered and the patient was prepped and draped in the usual sterile fashion. An occlusive dressing was applied to the foot up to the level of the ankle. The right leg was inspected and the site for transection of the leg was selected approximately at the level of the future posterior flap superior to the ankle. The leg was exsanguinated using a compression wrap and the tourniquet was inflated. The skin and subcutaneous tissues were incised down to the fascia. The greater and short saphenous veins on the medial and posterior aspects of the leg, respectively, were ligated and divided. The fascia and the muscles were then divided with the electrocautery at the same level as the skin incision. The muscles and the anterior and lateral compartment were divided, exposing the anterior tibial vessels, which were ligated and divided. The intraosseous membrane was then incised. The tibial periosteum incised circumferentially with the cautery at the same level of the skin and muscle division. Using a periosteal elevator, the tibial periosteum was stripped proximally 2 cm. The tibia was then transected with an electric saw. The fibula was then exposed, dissected circumferentially, and transected with an electric saw. The amputation was then completed with electrocautery, transecting the soleus muscle and the gastrocnemius muscle. The tourniquet was released and hemostasis was verified. The leg was irrigated and dried. The wound measured approximately 10cm x 10cm.  Skin prep was applied to the periwound, and Duoderm was cut into strips and placed around the circumference of the wound at the skin edges. A Silver sponge was cut to size and one piece was placed subcutaneously. The drape film was used to cover, and an opening was made for the track pad, which was then applied. The vac was tested and had good seal and suction. The procedure was concluded. The patient tolerated the procedure well with no apparent complications and was transferred to PACU - hemodynamically stable. Counts were reported correct at the end of the case. I was present and primarily performed all critical portions of the case.        Electronically signed:   Nick Navarro MD 9/3/2022 12:13 AM

## 2022-09-04 ENCOUNTER — ANESTHESIA EVENT (OUTPATIENT)
Dept: OPERATING ROOM | Age: 42
DRG: 710 | End: 2022-09-04
Payer: COMMERCIAL

## 2022-09-04 LAB
ALBUMIN SERPL-MCNC: 2.5 GM/DL (ref 3.4–5)
ALP BLD-CCNC: 73 IU/L (ref 40–128)
ALT SERPL-CCNC: 15 U/L (ref 10–40)
ANION GAP SERPL CALCULATED.3IONS-SCNC: 9 MMOL/L (ref 4–16)
AST SERPL-CCNC: 24 IU/L (ref 15–37)
BASOPHILS ABSOLUTE: 0 K/CU MM
BASOPHILS RELATIVE PERCENT: 0.2 % (ref 0–1)
BILIRUB SERPL-MCNC: 0.3 MG/DL (ref 0–1)
BUN BLDV-MCNC: 11 MG/DL (ref 6–23)
CALCIUM SERPL-MCNC: 7.5 MG/DL (ref 8.3–10.6)
CHLORIDE BLD-SCNC: 100 MMOL/L (ref 99–110)
CO2: 27 MMOL/L (ref 21–32)
CREAT SERPL-MCNC: 0.8 MG/DL (ref 0.9–1.3)
DIFFERENTIAL TYPE: ABNORMAL
EOSINOPHILS ABSOLUTE: 0 K/CU MM
EOSINOPHILS RELATIVE PERCENT: 0.3 % (ref 0–3)
GFR AFRICAN AMERICAN: >60 ML/MIN/1.73M2
GFR NON-AFRICAN AMERICAN: >60 ML/MIN/1.73M2
GLUCOSE BLD-MCNC: 130 MG/DL (ref 70–99)
GLUCOSE BLD-MCNC: 164 MG/DL (ref 70–99)
GLUCOSE BLD-MCNC: 198 MG/DL (ref 70–99)
GLUCOSE BLD-MCNC: 210 MG/DL (ref 70–99)
GLUCOSE BLD-MCNC: 221 MG/DL (ref 70–99)
HCT VFR BLD CALC: 34.2 % (ref 42–52)
HEMOGLOBIN: 10.7 GM/DL (ref 13.5–18)
IMMATURE NEUTROPHIL %: 0.6 % (ref 0–0.43)
LYMPHOCYTES ABSOLUTE: 2 K/CU MM
LYMPHOCYTES RELATIVE PERCENT: 16.7 % (ref 24–44)
MCH RBC QN AUTO: 28.4 PG (ref 27–31)
MCHC RBC AUTO-ENTMCNC: 31.3 % (ref 32–36)
MCV RBC AUTO: 90.7 FL (ref 78–100)
MONOCYTES ABSOLUTE: 0.8 K/CU MM
MONOCYTES RELATIVE PERCENT: 6.1 % (ref 0–4)
NUCLEATED RBC %: 0 %
PDW BLD-RTO: 15.3 % (ref 11.7–14.9)
PLATELET # BLD: 498 K/CU MM (ref 140–440)
PMV BLD AUTO: 8.8 FL (ref 7.5–11.1)
POTASSIUM SERPL-SCNC: 3.6 MMOL/L (ref 3.5–5.1)
RBC # BLD: 3.77 M/CU MM (ref 4.6–6.2)
SEGMENTED NEUTROPHILS ABSOLUTE COUNT: 9.3 K/CU MM
SEGMENTED NEUTROPHILS RELATIVE PERCENT: 76.1 % (ref 36–66)
SODIUM BLD-SCNC: 136 MMOL/L (ref 135–145)
TOTAL IMMATURE NEUTOROPHIL: 0.07 K/CU MM
TOTAL NUCLEATED RBC: 0 K/CU MM
TOTAL PROTEIN: 6.4 GM/DL (ref 6.4–8.2)
WBC # BLD: 12.2 K/CU MM (ref 4–10.5)

## 2022-09-04 PROCEDURE — 97163 PT EVAL HIGH COMPLEX 45 MIN: CPT

## 2022-09-04 PROCEDURE — 6370000000 HC RX 637 (ALT 250 FOR IP): Performed by: STUDENT IN AN ORGANIZED HEALTH CARE EDUCATION/TRAINING PROGRAM

## 2022-09-04 PROCEDURE — 2000000000 HC ICU R&B

## 2022-09-04 PROCEDURE — 2500000003 HC RX 250 WO HCPCS: Performed by: NURSE PRACTITIONER

## 2022-09-04 PROCEDURE — 85025 COMPLETE CBC W/AUTO DIFF WBC: CPT

## 2022-09-04 PROCEDURE — 6370000000 HC RX 637 (ALT 250 FOR IP): Performed by: PHYSICIAN ASSISTANT

## 2022-09-04 PROCEDURE — 99024 POSTOP FOLLOW-UP VISIT: CPT | Performed by: SURGERY

## 2022-09-04 PROCEDURE — 82962 GLUCOSE BLOOD TEST: CPT

## 2022-09-04 PROCEDURE — 97530 THERAPEUTIC ACTIVITIES: CPT

## 2022-09-04 PROCEDURE — 6360000002 HC RX W HCPCS: Performed by: NURSE PRACTITIONER

## 2022-09-04 PROCEDURE — 36415 COLL VENOUS BLD VENIPUNCTURE: CPT

## 2022-09-04 PROCEDURE — 94761 N-INVAS EAR/PLS OXIMETRY MLT: CPT

## 2022-09-04 PROCEDURE — 80053 COMPREHEN METABOLIC PANEL: CPT

## 2022-09-04 PROCEDURE — 2580000003 HC RX 258: Performed by: NURSE PRACTITIONER

## 2022-09-04 PROCEDURE — 97166 OT EVAL MOD COMPLEX 45 MIN: CPT

## 2022-09-04 PROCEDURE — 2580000003 HC RX 258: Performed by: ANESTHESIOLOGY

## 2022-09-04 RX ADMIN — CLINDAMYCIN IN 5 PERCENT DEXTROSE 900 MG: 18 INJECTION, SOLUTION INTRAVENOUS at 09:02

## 2022-09-04 RX ADMIN — SUCRALFATE 1 G: 1 TABLET ORAL at 20:57

## 2022-09-04 RX ADMIN — ATORVASTATIN CALCIUM 40 MG: 40 TABLET, FILM COATED ORAL at 20:57

## 2022-09-04 RX ADMIN — CEFEPIME HYDROCHLORIDE 2000 MG: 2 INJECTION, POWDER, FOR SOLUTION INTRAMUSCULAR; INTRAVENOUS at 07:00

## 2022-09-04 RX ADMIN — VANCOMYCIN HYDROCHLORIDE 1250 MG: 1.25 INJECTION, POWDER, LYOPHILIZED, FOR SOLUTION INTRAVENOUS at 21:31

## 2022-09-04 RX ADMIN — CLINDAMYCIN IN 5 PERCENT DEXTROSE 900 MG: 18 INJECTION, SOLUTION INTRAVENOUS at 00:40

## 2022-09-04 RX ADMIN — SODIUM CHLORIDE, PRESERVATIVE FREE 10 ML: 5 INJECTION INTRAVENOUS at 10:47

## 2022-09-04 RX ADMIN — PANTOPRAZOLE SODIUM 20 MG: 20 TABLET, DELAYED RELEASE ORAL at 07:00

## 2022-09-04 RX ADMIN — SUCRALFATE 1 G: 1 TABLET ORAL at 10:46

## 2022-09-04 RX ADMIN — INSULIN LISPRO 4 UNITS: 100 INJECTION, SOLUTION INTRAVENOUS; SUBCUTANEOUS at 10:39

## 2022-09-04 RX ADMIN — CEFEPIME HYDROCHLORIDE 2000 MG: 2 INJECTION, POWDER, FOR SOLUTION INTRAMUSCULAR; INTRAVENOUS at 20:59

## 2022-09-04 RX ADMIN — MORPHINE SULFATE 2 MG: 2 INJECTION, SOLUTION INTRAMUSCULAR; INTRAVENOUS at 20:53

## 2022-09-04 RX ADMIN — SODIUM CHLORIDE, PRESERVATIVE FREE 10 ML: 5 INJECTION INTRAVENOUS at 21:08

## 2022-09-04 RX ADMIN — VANCOMYCIN HYDROCHLORIDE 1000 MG: 1 INJECTION, POWDER, LYOPHILIZED, FOR SOLUTION INTRAVENOUS at 09:06

## 2022-09-04 RX ADMIN — FLUOXETINE 80 MG: 20 CAPSULE ORAL at 10:46

## 2022-09-04 RX ADMIN — CLINDAMYCIN IN 5 PERCENT DEXTROSE 900 MG: 18 INJECTION, SOLUTION INTRAVENOUS at 19:36

## 2022-09-04 RX ADMIN — MORPHINE SULFATE 2 MG: 2 INJECTION, SOLUTION INTRAMUSCULAR; INTRAVENOUS at 00:43

## 2022-09-04 RX ADMIN — DOCUSATE SODIUM 100 MG: 100 CAPSULE, LIQUID FILLED ORAL at 10:46

## 2022-09-04 RX ADMIN — ENOXAPARIN SODIUM 40 MG: 100 INJECTION SUBCUTANEOUS at 10:46

## 2022-09-04 ASSESSMENT — PAIN DESCRIPTION - ORIENTATION
ORIENTATION: RIGHT
ORIENTATION: RIGHT

## 2022-09-04 ASSESSMENT — ENCOUNTER SYMPTOMS
VOMITING: 0
BACK PAIN: 1
NAUSEA: 0
VOICE CHANGE: 0
SINUS PAIN: 0
DIARRHEA: 0
BLOOD IN STOOL: 0
EYE DISCHARGE: 0
SINUS PRESSURE: 0
ABDOMINAL PAIN: 0
EYE PAIN: 0
CHEST TIGHTNESS: 0
COUGH: 0
SHORTNESS OF BREATH: 0

## 2022-09-04 ASSESSMENT — PAIN - FUNCTIONAL ASSESSMENT: PAIN_FUNCTIONAL_ASSESSMENT: PREVENTS OR INTERFERES SOME ACTIVE ACTIVITIES AND ADLS

## 2022-09-04 ASSESSMENT — PAIN SCALES - GENERAL
PAINLEVEL_OUTOF10: 7
PAINLEVEL_OUTOF10: 0
PAINLEVEL_OUTOF10: 0
PAINLEVEL_OUTOF10: 7
PAINLEVEL_OUTOF10: 0
PAINLEVEL_OUTOF10: 0

## 2022-09-04 ASSESSMENT — PAIN DESCRIPTION - LOCATION
LOCATION: LEG
LOCATION: LEG

## 2022-09-04 ASSESSMENT — PAIN DESCRIPTION - DESCRIPTORS
DESCRIPTORS: ACHING;SORE
DESCRIPTORS: ACHING;SORE

## 2022-09-04 ASSESSMENT — LIFESTYLE VARIABLES: SMOKING_STATUS: 1

## 2022-09-04 ASSESSMENT — PAIN DESCRIPTION - FREQUENCY
FREQUENCY: INTERMITTENT
FREQUENCY: INTERMITTENT

## 2022-09-04 ASSESSMENT — PAIN DESCRIPTION - ONSET: ONSET: ON-GOING

## 2022-09-04 ASSESSMENT — PAIN DESCRIPTION - PAIN TYPE
TYPE: SURGICAL PAIN
TYPE: SURGICAL PAIN

## 2022-09-04 NOTE — PLAN OF CARE
Problem: Chronic Conditions and Co-morbidities  Goal: Patient's chronic conditions and co-morbidity symptoms are monitored and maintained or improved  Outcome: Progressing     Problem: Discharge Planning  Goal: Discharge to home or other facility with appropriate resources  Outcome: Progressing  Flowsheets (Taken 9/3/2022 1317 by Ariella Agee RN)  Discharge to home or other facility with appropriate resources: Identify barriers to discharge with patient and caregiver     Problem: Pain  Goal: Verbalizes/displays adequate comfort level or baseline comfort level  Outcome: Progressing     Problem: Skin/Tissue Integrity  Goal: Absence of new skin breakdown  Description: 1. Monitor for areas of redness and/or skin breakdown  2. Assess vascular access sites hourly  3. Every 4-6 hours minimum:  Change oxygen saturation probe site  4. Every 4-6 hours:  If on nasal continuous positive airway pressure, respiratory therapy assess nares and determine need for appliance change or resting period.   Outcome: Progressing     Problem: Safety - Adult  Goal: Free from fall injury  Outcome: Progressing

## 2022-09-04 NOTE — PROGRESS NOTES
V2.0  [facility] [place] Hospitalist Progress Note      Name:  Brant Roberson /Age/Sex: 1980  (39 y.o. male)   MRN & CSN:  [id number] & [id number] Encounter Date/Time: 2022 3:49 PM EDT    Location:  -A PCP: Julian Jose, APRN - [facility] Day: 3    Assessment and Plan:   Brant Roberson is a 39 y.o. male with hypertension, type 2 diabetes with long-term insulin use, mood disorder who presents with Necrotizing fasciitis ([facility] [address]      Plan:  Sepsis secondary to necrotizing fasciitis s/p right BKA with wound VAC placement: Currently on Vanco cefepime and clindamycin by general surgery. Appropriate bowel regimen. Wound care consulted. Optimize pain regimen in place. Diabetic foot ulcer of the left toe associated with diabetes mellitus: Wound care consult in place. Insulin-dependent diabetes mellitus type 2 on sliding scale insulin with a goal sugar of 140-180  Mood disorder continue fluoxetine  Benign essential hypertension    Diet ADULT DIET; Regular; 4 carb choices (60 gm/meal)  Diet NPO Exceptions are: Sips of Water with Meds   DVT Prophylaxis [x] Lovenox, []  Heparin, [] SCDs, [] Ambulation,  [] Eliquis, [] Xarelto  [] Coumadin   Code Status Full Code   Disposition From: Home  Expected Disposition: TBD  Estimated Date of Discharge: TBD     Surrogate Decision Maker/ KOREY Loya     Subjective:     Chief Complaint: Wound Check (Bilateral foot ulcers) and Emesis       The patient was on Precedex. Seen at bedside. Status post BKA. General surgery patient, actively following. Continue to monitor. Review of Systems:    Review of Systems   Constitutional:  Positive for appetite change and unexpected weight change. Negative for chills and fever. HENT:  Negative for ear pain, hearing loss, sinus pressure, sinus pain and voice change. Eyes:  Negative for pain, discharge and visual disturbance. Respiratory:  Negative for cough, chest tightness and shortness of breath.     Cardiovascular: Positive for leg swelling. Negative for chest pain and palpitations. Gastrointestinal:  Negative for abdominal pain, blood in stool, diarrhea, nausea and vomiting. Genitourinary:  Negative for dysuria and frequency. Musculoskeletal:  Positive for arthralgias and back pain. Neurological:  Positive for weakness. Negative for dizziness, light-headedness and headaches. Psychiatric/Behavioral:  Negative for sleep disturbance. Objective: Intake/Output Summary (Last 24 hours) at 9/4/2022 1549  Last data filed at 9/4/2022 0700  Gross per 24 hour   Intake 561.86 ml   Output 2305 ml   Net -1743.14 ml        Vitals:   Vitals:    09/04/22 1314   BP:    Pulse: 81   Resp:    Temp:    SpO2: 96%       Physical Exam:   Physical Exam  Vitals reviewed. Constitutional:       Appearance: Normal appearance. He is normal weight. HENT:      Head: Normocephalic. Nose: Nose normal.      Mouth/Throat:      Mouth: Mucous membranes are moist.   Eyes:      Conjunctiva/sclera: Conjunctivae normal.      Pupils: Pupils are equal, round, and reactive to light. Cardiovascular:      Rate and Rhythm: Normal rate and regular rhythm. Pulses: Normal pulses. Heart sounds: Normal heart sounds. No murmur heard. Pulmonary:      Effort: Pulmonary effort is normal.      Breath sounds: Rales present. No wheezing or rhonchi. Abdominal:      General: Abdomen is flat. Bowel sounds are normal. There is no distension. Palpations: Abdomen is soft. Tenderness: There is no abdominal tenderness. Musculoskeletal:         General: Deformity present. Normal range of motion. Cervical back: Normal range of motion and neck supple. Comments: BKA   Skin:     General: Skin is dry. Coloration: Skin is pale. Skin is not jaundiced. Neurological:      General: No focal deficit present. Mental Status: He is alert and oriented to person, place, and time. Mental status is at baseline.       Motor: Weakness present.    Psychiatric:         Mood and Affect: Mood normal.         Behavior: Behavior normal.          Medications:   Medications:    vancomycin  1,250 mg IntraVENous Q12H    sodium chloride flush  5-40 mL IntraVENous 2 times per day    sodium chloride flush  5-40 mL IntraVENous 2 times per day    [Held by provider] enoxaparin  40 mg SubCUTAneous Daily    cefepime  2,000 mg IntraVENous Q12H    clindamycin (CLEOCIN) IV  900 mg IntraVENous Q8H    docusate sodium  100 mg Oral Daily    atorvastatin  40 mg Oral Nightly    FLUoxetine  80 mg Oral Daily    [Held by provider] losartan  25 mg Oral Daily    [Held by provider] metoprolol succinate  25 mg Oral Daily    pantoprazole  20 mg Oral QAM AC    sucralfate  1 g Oral 2 times per day    insulin glargine  30 Units SubCUTAneous Nightly    insulin lispro  0-16 Units SubCUTAneous TID WC    insulin lispro  0-4 Units SubCUTAneous Nightly      Infusions:    sodium chloride      sodium chloride 20 mL/hr at 09/03/22 0914    dextrose       PRN Meds: sodium chloride flush, 5-40 mL, PRN  sodium chloride, 25 mL, PRN  fentanNYL, 25 mcg, Q5 Min PRN  HYDROmorphone, 0.5 mg, Q5 Min PRN  oxyCODONE, 5 mg, PRN   Or  oxyCODONE, 10 mg, PRN  labetalol, 10 mg, Q15 Min PRN   Or  hydrALAZINE, 10 mg, Q15 Min PRN  sodium chloride flush, 5-40 mL, PRN  sodium chloride, , PRN  ondansetron, 4 mg, Q8H PRN   Or  ondansetron, 4 mg, Q6H PRN  oxyCODONE, 5 mg, Q4H PRN   Or  oxyCODONE, 10 mg, Q4H PRN  morphine, 1 mg, Q2H PRN   Or  morphine, 2 mg, Q2H PRN  glucose, 4 tablet, PRN  dextrose bolus, 125 mL, PRN   Or  dextrose bolus, 250 mL, PRN  glucagon (rDNA), 1 mg, PRN  dextrose, , Continuous PRN  aluminum & magnesium hydroxide-simethicone, 30 mL, Q6H PRN        Labs      Recent Results (from the past 24 hour(s))   POCT Glucose    Collection Time: 09/03/22  5:22 PM   Result Value Ref Range    POC Glucose 260 (H) 70 - 99 MG/DL   POCT Glucose    Collection Time: 09/03/22  8:28 PM   Result Value Ref Range    POC Glucose 184 (H) 70 - 99 MG/DL   Comprehensive Metabolic Panel w/ Reflex to MG    Collection Time: 09/04/22  4:08 AM   Result Value Ref Range    Sodium 136 135 - 145 MMOL/L    Potassium 3.6 3.5 - 5.1 MMOL/L    Chloride 100 99 - 110 mMol/L    CO2 27 21 - 32 MMOL/L    BUN 11 6 - 23 MG/DL    Creatinine 0.8 (L) 0.9 - 1.3 MG/DL    Glucose 221 (H) 70 - 99 MG/DL    Calcium 7.5 (L) 8.3 - 10.6 MG/DL    Albumin 2.5 (L) 3.4 - 5.0 GM/DL    Total Protein 6.4 6.4 - 8.2 GM/DL    Total Bilirubin 0.3 0.0 - 1.0 MG/DL    ALT 15 10 - 40 U/L    AST 24 15 - 37 IU/L    Alkaline Phosphatase 73 40 - 128 IU/L    GFR Non-African American >60 >60 mL/min/1.73m2    GFR African American >60 >60 mL/min/1.73m2    Anion Gap 9 4 - 16   CBC with Auto Differential    Collection Time: 09/04/22  4:08 AM   Result Value Ref Range    WBC 12.2 (H) 4.0 - 10.5 K/CU MM    RBC 3.77 (L) 4.6 - 6.2 M/CU MM    Hemoglobin 10.7 (L) 13.5 - 18.0 GM/DL    Hematocrit 34.2 (L) 42 - 52 %    MCV 90.7 78 - 100 FL    MCH 28.4 27 - 31 PG    MCHC 31.3 (L) 32.0 - 36.0 %    RDW 15.3 (H) 11.7 - 14.9 %    Platelets 606 (H) 959 - 440 K/CU MM    MPV 8.8 7.5 - 11.1 FL    Differential Type AUTOMATED DIFFERENTIAL     Segs Relative 76.1 (H) 36 - 66 %    Lymphocytes % 16.7 (L) 24 - 44 %    Monocytes % 6.1 (H) 0 - 4 %    Eosinophils % 0.3 0 - 3 %    Basophils % 0.2 0 - 1 %    Segs Absolute 9.3 K/CU MM    Lymphocytes Absolute 2.0 K/CU MM    Monocytes Absolute 0.8 K/CU MM    Eosinophils Absolute 0.0 K/CU MM    Basophils Absolute 0.0 K/CU MM    Nucleated RBC % 0.0 %    Total Nucleated RBC 0.0 K/CU MM    Total Immature Neutrophil 0.07 K/CU MM    Immature Neutrophil % 0.6 (H) 0 - 0.43 %   POCT Glucose    Collection Time: 09/04/22  7:58 AM   Result Value Ref Range    POC Glucose 210 (H) 70 - 99 MG/DL   POCT Glucose    Collection Time: 09/04/22 12:19 PM   Result Value Ref Range    POC Glucose 164 (H) 70 - 99 MG/DL        Imaging/Diagnostics Last 24 Hours   XR FOOT RIGHT (MIN 3 VIEWS)    Result Date: 9/2/2022  EXAMINATION: THREE XRAY VIEWS OF THE RIGHT FOOT 9/2/2022 7:18 pm COMPARISON: April 18, 2022 HISTORY: ORDERING SYSTEM PROVIDED HISTORY: redness, swelling TECHNOLOGIST PROVIDED HISTORY: Reason for exam:->redness, swelling Reason for Exam: redness, swelling Additional signs and symptoms: none Relevant Medical/Surgical History: diabetes FINDINGS: Soft tissue ulceration noted involving the medial aspect of the foot. There has been prior transmetatarsal amputation. Focal osteolysis involving the 1st metatarsal remnant, with associated comminuted, mildly displaced subacute fracture. There is intra-articular extension to the tarsometatarsal joint. Immature callus formation is noted. The amputation margins of the 2nd, 3rd, 4th and 5th metatarsals appears stable. The joint alignment is maintained. Soft tissue gas extends along the foot, with foci of soft tissue gas also noted involving the anterior soft tissues at the level of the distal tibia. Findings are consistent with pyogenic infection involving the soft tissues forefoot/midfoot, with small foci of gas tracking along the dorsal aspect of the foot to the anterior soft tissues in the region the lower tibia. The extent of soft tissue infection could be further evaluated by CT or MRI if clinically indicated. There has been transmetatarsal amputation of the forefoot. Acute osteomyelitis is noted involving the 2nd metatarsal remnant. Additionally, there is a subacute appearing, comminuted and displaced intra-articular pathologic fracture involving the 1st metatarsal remnant. CTA LOWER EXTREMITY RIGHT W CONTRAST    Result Date: 9/2/2022  EXAMINATION: CTA OF THE RIGHT LOWER EXTREMITY 9/2/2022 9:03 pm TECHNIQUE: CTA of the right lower extremity was performed after the administration of intravenous contrast. Multiplanar reformatted images are provided for review. MIP images are provided for review. . Automated exposure control, iterative reconstruction, and/or weight based adjustment of the mA/kV was utilized to reduce the radiation dose to as low as reasonably achievable. COMPARISON: None. HISTORY ORDERING SYSTEM PROVIDED HISTORY: cellulitis, gas in foot, ?osteo TECHNOLOGIST PROVIDED HISTORY: Reason for exam:->cellulitis, gas in foot, ?osteo Reason for Exam: cellulitis, gas in foot, ?osteo FINDINGS: Bones: Prior transmetatarsal amputation of the 1st through 5th digits. There is osseous erosion and destruction with cortical irregularity in the remaining great toe metatarsal.  Degenerative changes are noted in the midfoot. Soft Tissue: There is diffuse subcutaneous edema and fluid in the calf soft tissues. There is associated skin thickening. There is an irregular rim enhancing gas and fluid containing collection in the anterior ankle soft tissues extending into the foot, which measures approximately 2.4 x 1.7 cm. There is additional soft tissue gas adjacent to the great toe. There is ulceration in the great toe soft tissues. There are mildly enlarged popliteal lymph nodes. There are enlarged bilateral inguinal lymph nodes with the right measuring up to 3 x 2.4 cm. Visualized bowel loops are nondilated. Bladder is partially distended. Vessels: The SFA is patent with scattered atherosclerosis. The popliteal artery is patent with scattered atherosclerosis. Evaluation of the calf vasculature is mildly limited due to contrast timing. The PT trunk is patent. The anterior tibial posterior tibial, and peroneal arteries appear patent to the foot. 1. Prior 1st through 5th transmetatarsal amputation. There is osseous erosion and destruction with cortical destruction in the remaining great toe metatarsal, concerning for osteomyelitis. 2. Soft tissue edema and emphysema with rim enhancing fluid collection in the anterior ankle and extending along the great toe metatarsal, consistent with abscess.  3. Atherosclerosis throughout the right lower extremity vasculature, which appears patent.        Electronically signed by Axel Valentin MD, MD on 9/4/2022 at 3:49 PM

## 2022-09-04 NOTE — PROGRESS NOTES
[address] [facility]  consulted by KAMI Mirza for monitoring and adjustment. Indication for treatment: Necrotizing fasciitis POD1 guillotine BKA  Goal trough: [] 10-15 mcg/mL or [x] 15-20 mcg/ml  AUC/LIDIA: [] <500 or [x] 400-600    Pertinent Laboratory Values:   Temp Readings from Last 3 Encounters:   09/04/22 98.4 °F (36.9 °C) (Oral)   09/02/22 98.6 °F (37 °C) (Oral)   08/30/22 99 °F (37.2 °C) (Temporal)     Recent Labs     09/02/22  1212 09/02/22  1845 09/04/22  0408   WBC 11.1* 13.9* 12.2*   LACTATE  --  1.4  --        Recent Labs     09/02/22  1845 09/04/22  0408   BUN 6 11   CREATININE 1.0 0.8*       Estimated Creatinine Clearance: 133 mL/min (A) (based on SCr of 0.8 mg/dL (L)). Intake/Output Summary (Last 24 hours) at 9/4/2022 1143  Last data filed at 9/4/2022 0700  Gross per 24 hour   Intake 1041.86 ml   Output 2305 ml   Net -1263.14 ml         Pertinent Cultures:  Date    Source    Results  9/02   Blood x 2   NGTD      Vancomycin level:   RANDOM:    Recent Labs     09/03/22  0745   VANCORANDOM 10.3       Assessment:  Renal function stable, UOP good. Pt afebrile with stable leukocytosis  S/p right leg guillotine BKA plans to return to OR for revision to closed BKA. Per surgery continue abx for now. Likely de-escalate once source control complete and pt no longer needs surgical intervention. Day(s) of therapy: 3 of 7  Vancomycin level: 10.3 (12 hours post dose)   with ssTrough 11    Plan:  Received vancomycin 2000 mg x 1 in the ED and started vancomycin 1000 mg IVPB q12h  Will increase to vanco 1250mg IV q12 hours to target  and trough 14  Pharmacy will continue to monitor patient and adjust therapy as indicated    Abram 3 9/06 @0600    Thank you for the consult.   Toyin Mooney, PharmD, [employer]

## 2022-09-04 NOTE — PROGRESS NOTES
V2.0  [facility] [place]      Name:  Clarisse Monroy /Age/Sex: 1980  (39 y.o. male)   MRN & CSN:  [id number] & [id number] Encounter Date/Time: 2022 3:49 PM EDT    Location:  -A PCP: Fernando Turner, APRN - [id number] Day: 3    Assessment and Plan:   Clarisse Monroy is a 39 y.o. male with hypertension, type 2 diabetes with long-term insulin use, mood disorder who presents with Necrotizing fasciitis ([facility] [address]      Plan:  Sepsis secondary to necrotizing fasciitis s/p right BKA with wound VAC placement: Currently on Vanco cefepime and clindamycin by general surgery. Appropriate bowel regimen. Wound care consulted. Optimize pain regimen in place. Empiric antibiotics to be continue for now. Tentative plan for return to the OR on Monday for revision of the amputation to close to BKA. Chronic diabetic foot ulcer of the left toe associated with diabetes mellitus: Wound care consult in place. Insulin-dependent diabetes mellitus type 2 on sliding scale insulin with a goal sugar of 140-180  Mood disorder continue fluoxetine  Benign essential hypertension    Diet ADULT DIET; Regular; 4 carb choices (60 gm/meal)  Diet NPO Exceptions are: Sips of Water with Meds   DVT Prophylaxis [x] Lovenox, []  Heparin, [] SCDs, [] Ambulation,  [] Eliquis, [] Xarelto  [] Coumadin   Code Status Full Code   Disposition From: Home  Expected Disposition: TBD  Estimated Date of Discharge: TBD     Surrogate Decision Maker/ KOREY Loya     Subjective:     Chief Complaint: Wound Check (Bilateral foot ulcers) and Emesis       The patient was on Precedex. Seen at bedside. Status post BKA. General surgery patient, actively following. Continue to monitor. Review of Systems:    Review of Systems   Constitutional:  Positive for appetite change and unexpected weight change. Negative for chills and fever. HENT:  Negative for ear pain, hearing loss, sinus pressure, sinus pain and voice change.     Eyes: Negative for pain, discharge and visual disturbance. Respiratory:  Negative for cough, chest tightness and shortness of breath. Cardiovascular:  Positive for leg swelling. Negative for chest pain and palpitations. Gastrointestinal:  Negative for abdominal pain, blood in stool, diarrhea, nausea and vomiting. Genitourinary:  Negative for dysuria and frequency. Musculoskeletal:  Positive for arthralgias and back pain. Neurological:  Positive for weakness. Negative for dizziness, light-headedness and headaches. Psychiatric/Behavioral:  Negative for sleep disturbance. Objective: Intake/Output Summary (Last 24 hours) at 9/4/2022 1553  Last data filed at 9/4/2022 0700  Gross per 24 hour   Intake 561.86 ml   Output 2305 ml   Net -1743.14 ml          Vitals:   Vitals:    09/04/22 1314   BP:    Pulse: 81   Resp:    Temp:    SpO2: 96%       Physical Exam:   Physical Exam  Vitals reviewed. Constitutional:       Appearance: Normal appearance. He is normal weight. HENT:      Head: Normocephalic. Nose: Nose normal.      Mouth/Throat:      Mouth: Mucous membranes are moist.   Eyes:      Conjunctiva/sclera: Conjunctivae normal.      Pupils: Pupils are equal, round, and reactive to light. Cardiovascular:      Rate and Rhythm: Normal rate and regular rhythm. Pulses: Normal pulses. Heart sounds: Normal heart sounds. No murmur heard. Pulmonary:      Effort: Pulmonary effort is normal.      Breath sounds: Rales present. No wheezing or rhonchi. Abdominal:      General: Abdomen is flat. Bowel sounds are normal. There is no distension. Palpations: Abdomen is soft. Tenderness: There is no abdominal tenderness. Musculoskeletal:         General: Deformity present. Normal range of motion. Cervical back: Normal range of motion and neck supple. Comments: BKA   Skin:     General: Skin is dry. Coloration: Skin is pale. Skin is not jaundiced.    Neurological:      General: No focal deficit present. Mental Status: He is alert and oriented to person, place, and time. Mental status is at baseline. Motor: Weakness present.    Psychiatric:         Mood and Affect: Mood normal.         Behavior: Behavior normal.          Medications:   Medications:    vancomycin  1,250 mg IntraVENous Q12H    sodium chloride flush  5-40 mL IntraVENous 2 times per day    sodium chloride flush  5-40 mL IntraVENous 2 times per day    [Held by provider] enoxaparin  40 mg SubCUTAneous Daily    cefepime  2,000 mg IntraVENous Q12H    clindamycin (CLEOCIN) IV  900 mg IntraVENous Q8H    docusate sodium  100 mg Oral Daily    atorvastatin  40 mg Oral Nightly    FLUoxetine  80 mg Oral Daily    [Held by provider] losartan  25 mg Oral Daily    [Held by provider] metoprolol succinate  25 mg Oral Daily    pantoprazole  20 mg Oral QAM AC    sucralfate  1 g Oral 2 times per day    insulin glargine  30 Units SubCUTAneous Nightly    insulin lispro  0-16 Units SubCUTAneous TID WC    insulin lispro  0-4 Units SubCUTAneous Nightly      Infusions:    sodium chloride      sodium chloride 20 mL/hr at 09/03/22 0914    dextrose       PRN Meds: sodium chloride flush, 5-40 mL, PRN  sodium chloride, 25 mL, PRN  fentanNYL, 25 mcg, Q5 Min PRN  HYDROmorphone, 0.5 mg, Q5 Min PRN  oxyCODONE, 5 mg, PRN   Or  oxyCODONE, 10 mg, PRN  labetalol, 10 mg, Q15 Min PRN   Or  hydrALAZINE, 10 mg, Q15 Min PRN  sodium chloride flush, 5-40 mL, PRN  sodium chloride, , PRN  ondansetron, 4 mg, Q8H PRN   Or  ondansetron, 4 mg, Q6H PRN  oxyCODONE, 5 mg, Q4H PRN   Or  oxyCODONE, 10 mg, Q4H PRN  morphine, 1 mg, Q2H PRN   Or  morphine, 2 mg, Q2H PRN  glucose, 4 tablet, PRN  dextrose bolus, 125 mL, PRN   Or  dextrose bolus, 250 mL, PRN  glucagon (rDNA), 1 mg, PRN  dextrose, , Continuous PRN  aluminum & magnesium hydroxide-simethicone, 30 mL, Q6H PRN      Labs      Recent Results (from the past 24 hour(s))   POCT Glucose    Collection Time: 09/03/22  5:22 PM Result Value Ref Range    POC Glucose 260 (H) 70 - 99 MG/DL   POCT Glucose    Collection Time: 09/03/22  8:28 PM   Result Value Ref Range    POC Glucose 184 (H) 70 - 99 MG/DL   Comprehensive Metabolic Panel w/ Reflex to MG    Collection Time: 09/04/22  4:08 AM   Result Value Ref Range    Sodium 136 135 - 145 MMOL/L    Potassium 3.6 3.5 - 5.1 MMOL/L    Chloride 100 99 - 110 mMol/L    CO2 27 21 - 32 MMOL/L    BUN 11 6 - 23 MG/DL    Creatinine 0.8 (L) 0.9 - 1.3 MG/DL    Glucose 221 (H) 70 - 99 MG/DL    Calcium 7.5 (L) 8.3 - 10.6 MG/DL    Albumin 2.5 (L) 3.4 - 5.0 GM/DL    Total Protein 6.4 6.4 - 8.2 GM/DL    Total Bilirubin 0.3 0.0 - 1.0 MG/DL    ALT 15 10 - 40 U/L    AST 24 15 - 37 IU/L    Alkaline Phosphatase 73 40 - 128 IU/L    GFR Non-African American >60 >60 mL/min/1.73m2    GFR African American >60 >60 mL/min/1.73m2    Anion Gap 9 4 - 16   CBC with Auto Differential    Collection Time: 09/04/22  4:08 AM   Result Value Ref Range    WBC 12.2 (H) 4.0 - 10.5 K/CU MM    RBC 3.77 (L) 4.6 - 6.2 M/CU MM    Hemoglobin 10.7 (L) 13.5 - 18.0 GM/DL    Hematocrit 34.2 (L) 42 - 52 %    MCV 90.7 78 - 100 FL    MCH 28.4 27 - 31 PG    MCHC 31.3 (L) 32.0 - 36.0 %    RDW 15.3 (H) 11.7 - 14.9 %    Platelets 582 (H) 509 - 440 K/CU MM    MPV 8.8 7.5 - 11.1 FL    Differential Type AUTOMATED DIFFERENTIAL     Segs Relative 76.1 (H) 36 - 66 %    Lymphocytes % 16.7 (L) 24 - 44 %    Monocytes % 6.1 (H) 0 - 4 %    Eosinophils % 0.3 0 - 3 %    Basophils % 0.2 0 - 1 %    Segs Absolute 9.3 K/CU MM    Lymphocytes Absolute 2.0 K/CU MM    Monocytes Absolute 0.8 K/CU MM    Eosinophils Absolute 0.0 K/CU MM    Basophils Absolute 0.0 K/CU MM    Nucleated RBC % 0.0 %    Total Nucleated RBC 0.0 K/CU MM    Total Immature Neutrophil 0.07 K/CU MM    Immature Neutrophil % 0.6 (H) 0 - 0.43 %   POCT Glucose    Collection Time: 09/04/22  7:58 AM   Result Value Ref Range    POC Glucose 210 (H) 70 - 99 MG/DL   POCT Glucose    Collection Time: 09/04/22 12:19 PM Result Value Ref Range    POC Glucose 164 (H) 70 - 99 MG/DL        Imaging/Diagnostics Last 24 Hours   XR FOOT RIGHT (MIN 3 VIEWS)    Result Date: 9/2/2022  EXAMINATION: THREE XRAY VIEWS OF THE RIGHT FOOT 9/2/2022 7:18 pm COMPARISON: April 18, 2022 HISTORY: ORDERING SYSTEM PROVIDED HISTORY: redness, swelling TECHNOLOGIST PROVIDED HISTORY: Reason for exam:->redness, swelling Reason for Exam: redness, swelling Additional signs and symptoms: none Relevant Medical/Surgical History: diabetes FINDINGS: Soft tissue ulceration noted involving the medial aspect of the foot. There has been prior transmetatarsal amputation. Focal osteolysis involving the 1st metatarsal remnant, with associated comminuted, mildly displaced subacute fracture. There is intra-articular extension to the tarsometatarsal joint. Immature callus formation is noted. The amputation margins of the 2nd, 3rd, 4th and 5th metatarsals appears stable. The joint alignment is maintained. Soft tissue gas extends along the foot, with foci of soft tissue gas also noted involving the anterior soft tissues at the level of the distal tibia. Findings are consistent with pyogenic infection involving the soft tissues forefoot/midfoot, with small foci of gas tracking along the dorsal aspect of the foot to the anterior soft tissues in the region the lower tibia. The extent of soft tissue infection could be further evaluated by CT or MRI if clinically indicated. There has been transmetatarsal amputation of the forefoot. Acute osteomyelitis is noted involving the 2nd metatarsal remnant. Additionally, there is a subacute appearing, comminuted and displaced intra-articular pathologic fracture involving the 1st metatarsal remnant.      CTA LOWER EXTREMITY RIGHT W CONTRAST    Result Date: 9/2/2022  EXAMINATION: CTA OF THE RIGHT LOWER EXTREMITY 9/2/2022 9:03 pm TECHNIQUE: CTA of the right lower extremity was performed after the administration of intravenous contrast. Multiplanar reformatted images are provided for review. MIP images are provided for review. . Automated exposure control, iterative reconstruction, and/or weight based adjustment of the mA/kV was utilized to reduce the radiation dose to as low as reasonably achievable. COMPARISON: None. HISTORY ORDERING SYSTEM PROVIDED HISTORY: cellulitis, gas in foot, ?osteo TECHNOLOGIST PROVIDED HISTORY: Reason for exam:->cellulitis, gas in foot, ?osteo Reason for Exam: cellulitis, gas in foot, ?osteo FINDINGS: Bones: Prior transmetatarsal amputation of the 1st through 5th digits. There is osseous erosion and destruction with cortical irregularity in the remaining great toe metatarsal.  Degenerative changes are noted in the midfoot. Soft Tissue: There is diffuse subcutaneous edema and fluid in the calf soft tissues. There is associated skin thickening. There is an irregular rim enhancing gas and fluid containing collection in the anterior ankle soft tissues extending into the foot, which measures approximately 2.4 x 1.7 cm. There is additional soft tissue gas adjacent to the great toe. There is ulceration in the great toe soft tissues. There are mildly enlarged popliteal lymph nodes. There are enlarged bilateral inguinal lymph nodes with the right measuring up to 3 x 2.4 cm. Visualized bowel loops are nondilated. Bladder is partially distended. Vessels: The SFA is patent with scattered atherosclerosis. The popliteal artery is patent with scattered atherosclerosis. Evaluation of the calf vasculature is mildly limited due to contrast timing. The PT trunk is patent. The anterior tibial posterior tibial, and peroneal arteries appear patent to the foot. 1. Prior 1st through 5th transmetatarsal amputation. There is osseous erosion and destruction with cortical destruction in the remaining great toe metatarsal, concerning for osteomyelitis.  2. Soft tissue edema and emphysema with rim enhancing fluid collection in the anterior ankle and extending along the great toe metatarsal, consistent with abscess. 3. Atherosclerosis throughout the right lower extremity vasculature, which appears patent.        Electronically signed by Jeff Roman MD, MD on 9/4/2022 at 3:53 PM

## 2022-09-04 NOTE — PROGRESS NOTES
GENERAL SURGERY INPATIENT POST-OP NOTE  [facility]) Physicians    PATIENT: Ry Goddard, 39 y.o., male, MRN: [id number]    Hospital Day:  LOS: 1 day , Post-Op Day: 2 Days Post-Op    Procedure(s): 22 RIGHT LEG AMPUTATION BELOW KNEE    Ry Goddard is a 39 y.o. male who presented with history of diabetic foot ulcers, recent hospital discharge for pneumatosis coli, now presenting with acute worsening of his right foot wound concerning for necrotizing fasciitis             Subjective:  Chief Complaint: better today  Pain: 0/10  BM:  yes  Diet: ADULT DIET; Regular; 4 carb choices (60 gm/meal)  Activity: with assistance    Stable overnight. Denies pain today, but had some pain overnight. Wound vac in place, denies issues. No N/V.      Objective:    Vitals: /66   Pulse 70   Temp 98.4 °F (36.9 °C) (Oral)   Resp 17   Ht 6' (1.829 m)   Wt 181 lb 7 oz (82.3 kg)   SpO2 96%   BMI 24.61 kg/m²   Vital Signs (Last 24 Hours)  Temp  Av.3 °F (36.8 °C)  Min: 97.9 °F (36.6 °C)  Max: 98.5 °F (36.9 °C)  Pulse  Av.8  Min: 69  Max: 85  BP  Min: 109/66  Max: 147/91  Resp  Av  Min: 14  Max: 37  SpO2  Av.4 %  Min: 93 %  Max: 100 %  Wt Readings from Last 3 Encounters:   22 181 lb 7 oz (82.3 kg)   22 184 lb 11.9 oz (83.8 kg)   22 180 lb (81.6 kg)       I/O:  1901 -  0700  In: 2259.7 [P.O.:640; I.V.:765.9]  Out: 2835 [Urine:2755; Drains:50]    IV Fluids:   sodium chloride    sodium chloride Last Rate: 20 mL/hr at 22 0914    dextrose    Scheduled Meds:   sodium chloride flush, 5-40 mL, IntraVENous, 2 times per day    sodium chloride flush, 5-40 mL, IntraVENous, 2 times per day    enoxaparin, 40 mg, SubCUTAneous, Daily    cefepime, 2,000 mg, IntraVENous, Q12H    clindamycin (CLEOCIN) IV, 900 mg, IntraVENous, Q8H    vancomycin, 1,000 mg, IntraVENous, Q12H    docusate sodium, 100 mg, Oral, Daily    atorvastatin, 40 mg, Oral, Nightly    FLUoxetine, 80 mg, Oral, Daily    [Held by provider] losartan, 25 mg, Oral, Daily    [Held by provider] metoprolol succinate, 25 mg, Oral, Daily    pantoprazole, 20 mg, Oral, QAM AC    sucralfate, 1 g, Oral, 2 times per day    insulin glargine, 30 Units, SubCUTAneous, Nightly    insulin lispro, 0-16 Units, SubCUTAneous, TID WC    insulin lispro, 0-4 Units, SubCUTAneous, Nightly    Physical Exam:  General Appearance:   Alert, cooperative, no distress    Head:   Normocephalic, atraumatic    Lungs:    Equal chest rise, respirations unlabored   Heart:   Regular rate and rhythm    Abdomen:    Soft, non-tender, no rebound or guarding    Extremities:  No cyanosis, improving edema in the right BKA stump, no erythema.  Wound vac C/D/I  Left foot with chronic ulcers of the medial foot and plantar foot, stable   Neurologic:  Nonfocal, grossly intact, peripheral neuropathy        Labs/Imaging Results:   Recent Results (from the past 24 hour(s))   POCT Glucose    Collection Time: 09/03/22 12:46 PM   Result Value Ref Range    POC Glucose 296 (H) 70 - 99 MG/DL   POCT Glucose    Collection Time: 09/03/22  5:22 PM   Result Value Ref Range    POC Glucose 260 (H) 70 - 99 MG/DL   POCT Glucose    Collection Time: 09/03/22  8:28 PM   Result Value Ref Range    POC Glucose 184 (H) 70 - 99 MG/DL   Comprehensive Metabolic Panel w/ Reflex to MG    Collection Time: 09/04/22  4:08 AM   Result Value Ref Range    Sodium 136 135 - 145 MMOL/L    Potassium 3.6 3.5 - 5.1 MMOL/L    Chloride 100 99 - 110 mMol/L    CO2 27 21 - 32 MMOL/L    BUN 11 6 - 23 MG/DL    Creatinine 0.8 (L) 0.9 - 1.3 MG/DL    Glucose 221 (H) 70 - 99 MG/DL    Calcium 7.5 (L) 8.3 - 10.6 MG/DL    Albumin 2.5 (L) 3.4 - 5.0 GM/DL    Total Protein 6.4 6.4 - 8.2 GM/DL    Total Bilirubin 0.3 0.0 - 1.0 MG/DL    ALT 15 10 - 40 U/L    AST 24 15 - 37 IU/L    Alkaline Phosphatase 73 40 - 128 IU/L    GFR Non-African American >60 >60 mL/min/1.73m2    GFR African American >60 >60 mL/min/1.73m2    Anion Gap 9 4 - 16   CBC with Auto Differential    Collection Time: 09/04/22  4:08 AM   Result Value Ref Range    WBC 12.2 (H) 4.0 - 10.5 K/CU MM    RBC 3.77 (L) 4.6 - 6.2 M/CU MM    Hemoglobin 10.7 (L) 13.5 - 18.0 GM/DL    Hematocrit 34.2 (L) 42 - 52 %    MCV 90.7 78 - 100 FL    MCH 28.4 27 - 31 PG    MCHC 31.3 (L) 32.0 - 36.0 %    RDW 15.3 (H) 11.7 - 14.9 %    Platelets 157 (H) 854 - 440 K/CU MM    MPV 8.8 7.5 - 11.1 FL    Differential Type AUTOMATED DIFFERENTIAL     Segs Relative 76.1 (H) 36 - 66 %    Lymphocytes % 16.7 (L) 24 - 44 %    Monocytes % 6.1 (H) 0 - 4 %    Eosinophils % 0.3 0 - 3 %    Basophils % 0.2 0 - 1 %    Segs Absolute 9.3 K/CU MM    Lymphocytes Absolute 2.0 K/CU MM    Monocytes Absolute 0.8 K/CU MM    Eosinophils Absolute 0.0 K/CU MM    Basophils Absolute 0.0 K/CU MM    Nucleated RBC % 0.0 %    Total Nucleated RBC 0.0 K/CU MM    Total Immature Neutrophil 0.07 K/CU MM    Immature Neutrophil % 0.6 (H) 0 - 0.43 %   POCT Glucose    Collection Time: 09/04/22  7:58 AM   Result Value Ref Range    POC Glucose 210 (H) 70 - 99 MG/DL         Assessment:  Dellie Goldberg is a 39 y.o. male who is post-op day #2 Days Post-Op 9/2/22 RIGHT LEG AMPUTATION BELOW KNEE      Principal Problem:    Necrotizing fasciitis ([address]  Active Problems:    Diabetic ulcer of toe of left foot associated with type 2 diabetes mellitus, with fat layer exposed ([address]    Sepsis ([address]    Pneumatosis coli    Type 2 diabetes mellitus with right diabetic foot infection (HCC)    Necrotizing fasciitis of lower leg (HCC)    Type 2 diabetes mellitus, with long-term current use of insulin (HCC)  Resolved Problems:    * No resolved hospital problems. *    Plan:  Discussed findings and options with Dellie Goldberg. Amputation site looking better today, no signs of progressing necrotizing fasciitis post amputation. Continue wound vac. Will plan on return to the OR Monday for revision of the amputation from a guillotine BKA to a closed BKA.    NPO past midnight  Chronic diabetic wounds of left foot - will plan to debride when is right BKA is formalized tomorrow  Continue empiric abx for now  PT/OT     Electronically signed: Ian Richardson MD 9/4/2022 10:12 AM

## 2022-09-04 NOTE — PROGRESS NOTES
I have personally seen and examined the patient independently. I have reviewed the patient's available data,including medical history and recent test results. Reviewed and discussed note as documented by ETTA. I agree with the physical exam findings, assessment and plan. My documented MDM is a substantive portion of the supervisory note. Patient is lying in the bed. He had Right LE amputation for the necrotizing fasciitis. He is lying in the bed. He is in mild resp distress.      ASSESSMENT     Right LE necrotizing fasciitis s/p amputation  DM II  HTN  HLD  Depression              PLAN  Cefepime , Vanco and Clindamycin  F/u C&S  Wound vac and care  Insulin  Keep sats > 92%  DVT and GI Prophylaxis  PT/OT  C/w present management             V2.0  [facility] Hospitalist Progress Note      Name:  Dulce Friedman /Age/Sex: 1980  (39 y.o. male)   MRN & CSN:  [id number] & [id number] Encounter Date/Time: 2022 3:18 PM EDT    Location:  -A PCP: Pablo Curtis APRN - [facility] Day: 3    Assessment and Plan:   Dulce Friedman is a 39 y.o. male with pmh of hypertension, diabetes mellitus type 2, complicated by diabetic neuropathy, diabetic foot ulcer requiring amputation, hyperlipidemia, anxiety/depression who presents with Necrotizing fasciitis ([address]    Right lower extremity necrotizing fasciitis S/P below-knee amputation-  -S/t Chronic complicated diabetic foot ulcer associated with edema, abscess and drainage  -CTA-right lower extremity-reviewed  -Continue cefepime, clindamycin, vancomycin  -Surgical path report, blood cultures-pending  -General surgery following     Sepsis s/t necrotizing fasciitis of right lower extremity- POA  -Continue vancomycin, cefepime, clindamycin  -Wound care consulted  -Mild leukocytosis, afebrile, follow-up culture report     Poorly controlled diabetes mellitus type 2 complicated with diabetic neuropathy, diabetic foot ulcers  -HbA1c 10.5 in July  -Require strict blood sugar control for better healing-communicated this with patient  -Continue SSI, Lantus 30 units nightly, optimize as needed to maintain BS level and euglycemic range     Pneumatosis coli-presented with nausea vomiting and abdominal pain on 8/31, treated with Cipro/Flagyl-discharged on 9/2 in stable condition     Chronic conditions:  HTN-hold antihypertensives for soft pressures  Depression-continue fluoxetine  HLD-continue statin    Diet ADULT DIET; Regular; 4 carb choices (60 gm/meal)  Diet NPO Exceptions are: Sips of Water with Meds   DVT Prophylaxis [x] Lovenox, []  Heparin, [] SCDs, [] Ambulation,  [] Eliquis, [] Xarelto  [] Coumadin   Code Status Full Code   Disposition From: Home  Expected Disposition: Home  Estimated Date of Discharge: TBD  Patient requires continued admission due to sepsis   Surrogate Decision Maker/ POA Unknown     Subjective:     Patient seen and examined this morning. Upon entering the room the patient is sleeping and I had to wake him up to speak with him. He currently denies any complaints. He states he is not having any pain. Wound VAC to right foot. Review of Systems:    Review of Systems   All other systems reviewed and are negative. Objective: Intake/Output Summary (Last 24 hours) at 9/4/2022 1518  Last data filed at 9/4/2022 0700  Gross per 24 hour   Intake 561.86 ml   Output 2305 ml   Net -1743.14 ml        Vitals:   Vitals:    09/04/22 1314   BP:    Pulse: 81   Resp:    Temp:    SpO2: 96%       Physical Exam:   General: Patient is a 49-year-old male , appears older than stated age, NAD  Eyes: EOMI  ENT: neck supple  Cardiovascular: Regular rate, sinus rhythm  Respiratory: Clear to auscultation  Gastrointestinal: Soft, non tender  Genitourinary: no suprapubic tenderness  Musculoskeletal: rt BKA, LLE diabetic foot ulcers  Skin: warm, dry  Neuro: Alert and oriented x4, no lateralizing weakness  Psych: Mood appropriate.      Medications:   Medications: vancomycin  1,250 mg IntraVENous Q12H    sodium chloride flush  5-40 mL IntraVENous 2 times per day    sodium chloride flush  5-40 mL IntraVENous 2 times per day    [Held by provider] enoxaparin  40 mg SubCUTAneous Daily    cefepime  2,000 mg IntraVENous Q12H    clindamycin (CLEOCIN) IV  900 mg IntraVENous Q8H    docusate sodium  100 mg Oral Daily    atorvastatin  40 mg Oral Nightly    FLUoxetine  80 mg Oral Daily    [Held by provider] losartan  25 mg Oral Daily    [Held by provider] metoprolol succinate  25 mg Oral Daily    pantoprazole  20 mg Oral QAM AC    sucralfate  1 g Oral 2 times per day    insulin glargine  30 Units SubCUTAneous Nightly    insulin lispro  0-16 Units SubCUTAneous TID WC    insulin lispro  0-4 Units SubCUTAneous Nightly      Infusions:    sodium chloride      sodium chloride 20 mL/hr at 09/03/22 0914    dextrose       PRN Meds: sodium chloride flush, 5-40 mL, PRN  sodium chloride, 25 mL, PRN  fentanNYL, 25 mcg, Q5 Min PRN  HYDROmorphone, 0.5 mg, Q5 Min PRN  oxyCODONE, 5 mg, PRN   Or  oxyCODONE, 10 mg, PRN  labetalol, 10 mg, Q15 Min PRN   Or  hydrALAZINE, 10 mg, Q15 Min PRN  sodium chloride flush, 5-40 mL, PRN  sodium chloride, , PRN  ondansetron, 4 mg, Q8H PRN   Or  ondansetron, 4 mg, Q6H PRN  oxyCODONE, 5 mg, Q4H PRN   Or  oxyCODONE, 10 mg, Q4H PRN  morphine, 1 mg, Q2H PRN   Or  morphine, 2 mg, Q2H PRN  glucose, 4 tablet, PRN  dextrose bolus, 125 mL, PRN   Or  dextrose bolus, 250 mL, PRN  glucagon (rDNA), 1 mg, PRN  dextrose, , Continuous PRN  aluminum & magnesium hydroxide-simethicone, 30 mL, Q6H PRN        Labs      Recent Results (from the past 24 hour(s))   POCT Glucose    Collection Time: 09/03/22  5:22 PM   Result Value Ref Range    POC Glucose 260 (H) 70 - 99 MG/DL   POCT Glucose    Collection Time: 09/03/22  8:28 PM   Result Value Ref Range    POC Glucose 184 (H) 70 - 99 MG/DL   Comprehensive Metabolic Panel w/ Reflex to MG    Collection Time: 09/04/22  4:08 AM   Result Value Ref Range    Sodium 136 135 - 145 MMOL/L    Potassium 3.6 3.5 - 5.1 MMOL/L    Chloride 100 99 - 110 mMol/L    CO2 27 21 - 32 MMOL/L    BUN 11 6 - 23 MG/DL    Creatinine 0.8 (L) 0.9 - 1.3 MG/DL    Glucose 221 (H) 70 - 99 MG/DL    Calcium 7.5 (L) 8.3 - 10.6 MG/DL    Albumin 2.5 (L) 3.4 - 5.0 GM/DL    Total Protein 6.4 6.4 - 8.2 GM/DL    Total Bilirubin 0.3 0.0 - 1.0 MG/DL    ALT 15 10 - 40 U/L    AST 24 15 - 37 IU/L    Alkaline Phosphatase 73 40 - 128 IU/L    GFR Non-African American >60 >60 mL/min/1.73m2    GFR African American >60 >60 mL/min/1.73m2    Anion Gap 9 4 - 16   CBC with Auto Differential    Collection Time: 09/04/22  4:08 AM   Result Value Ref Range    WBC 12.2 (H) 4.0 - 10.5 K/CU MM    RBC 3.77 (L) 4.6 - 6.2 M/CU MM    Hemoglobin 10.7 (L) 13.5 - 18.0 GM/DL    Hematocrit 34.2 (L) 42 - 52 %    MCV 90.7 78 - 100 FL    MCH 28.4 27 - 31 PG    MCHC 31.3 (L) 32.0 - 36.0 %    RDW 15.3 (H) 11.7 - 14.9 %    Platelets 385 (H) 533 - 440 K/CU MM    MPV 8.8 7.5 - 11.1 FL    Differential Type AUTOMATED DIFFERENTIAL     Segs Relative 76.1 (H) 36 - 66 %    Lymphocytes % 16.7 (L) 24 - 44 %    Monocytes % 6.1 (H) 0 - 4 %    Eosinophils % 0.3 0 - 3 %    Basophils % 0.2 0 - 1 %    Segs Absolute 9.3 K/CU MM    Lymphocytes Absolute 2.0 K/CU MM    Monocytes Absolute 0.8 K/CU MM    Eosinophils Absolute 0.0 K/CU MM    Basophils Absolute 0.0 K/CU MM    Nucleated RBC % 0.0 %    Total Nucleated RBC 0.0 K/CU MM    Total Immature Neutrophil 0.07 K/CU MM    Immature Neutrophil % 0.6 (H) 0 - 0.43 %   POCT Glucose    Collection Time: 09/04/22  7:58 AM   Result Value Ref Range    POC Glucose 210 (H) 70 - 99 MG/DL   POCT Glucose    Collection Time: 09/04/22 12:19 PM   Result Value Ref Range    POC Glucose 164 (H) 70 - 99 MG/DL        Imaging/Diagnostics Last 24 Hours   XR FOOT RIGHT (MIN 3 VIEWS)    Result Date: 9/2/2022  EXAMINATION: THREE XRAY VIEWS OF THE RIGHT FOOT 9/2/2022 7:18 pm COMPARISON: April 18, 2022 HISTORY: ORDERING SYSTEM PROVIDED HISTORY: redness, swelling TECHNOLOGIST PROVIDED HISTORY: Reason for exam:->redness, swelling Reason for Exam: redness, swelling Additional signs and symptoms: none Relevant Medical/Surgical History: diabetes FINDINGS: Soft tissue ulceration noted involving the medial aspect of the foot. There has been prior transmetatarsal amputation. Focal osteolysis involving the 1st metatarsal remnant, with associated comminuted, mildly displaced subacute fracture. There is intra-articular extension to the tarsometatarsal joint. Immature callus formation is noted. The amputation margins of the 2nd, 3rd, 4th and 5th metatarsals appears stable. The joint alignment is maintained. Soft tissue gas extends along the foot, with foci of soft tissue gas also noted involving the anterior soft tissues at the level of the distal tibia. Findings are consistent with pyogenic infection involving the soft tissues forefoot/midfoot, with small foci of gas tracking along the dorsal aspect of the foot to the anterior soft tissues in the region the lower tibia. The extent of soft tissue infection could be further evaluated by CT or MRI if clinically indicated. There has been transmetatarsal amputation of the forefoot. Acute osteomyelitis is noted involving the 2nd metatarsal remnant. Additionally, there is a subacute appearing, comminuted and displaced intra-articular pathologic fracture involving the 1st metatarsal remnant. CTA LOWER EXTREMITY RIGHT W CONTRAST    Result Date: 9/2/2022  EXAMINATION: CTA OF THE RIGHT LOWER EXTREMITY 9/2/2022 9:03 pm TECHNIQUE: CTA of the right lower extremity was performed after the administration of intravenous contrast. Multiplanar reformatted images are provided for review. MIP images are provided for review. . Automated exposure control, iterative reconstruction, and/or weight based adjustment of the mA/kV was utilized to reduce the radiation dose to as low as reasonably achievable. COMPARISON: None. HISTORY ORDERING SYSTEM PROVIDED HISTORY: cellulitis, gas in foot, ?osteo TECHNOLOGIST PROVIDED HISTORY: Reason for exam:->cellulitis, gas in foot, ?osteo Reason for Exam: cellulitis, gas in foot, ?osteo FINDINGS: Bones: Prior transmetatarsal amputation of the 1st through 5th digits. There is osseous erosion and destruction with cortical irregularity in the remaining great toe metatarsal.  Degenerative changes are noted in the midfoot. Soft Tissue: There is diffuse subcutaneous edema and fluid in the calf soft tissues. There is associated skin thickening. There is an irregular rim enhancing gas and fluid containing collection in the anterior ankle soft tissues extending into the foot, which measures approximately 2.4 x 1.7 cm. There is additional soft tissue gas adjacent to the great toe. There is ulceration in the great toe soft tissues. There are mildly enlarged popliteal lymph nodes. There are enlarged bilateral inguinal lymph nodes with the right measuring up to 3 x 2.4 cm. Visualized bowel loops are nondilated. Bladder is partially distended. Vessels: The SFA is patent with scattered atherosclerosis. The popliteal artery is patent with scattered atherosclerosis. Evaluation of the calf vasculature is mildly limited due to contrast timing. The PT trunk is patent. The anterior tibial posterior tibial, and peroneal arteries appear patent to the foot. 1. Prior 1st through 5th transmetatarsal amputation. There is osseous erosion and destruction with cortical destruction in the remaining great toe metatarsal, concerning for osteomyelitis. 2. Soft tissue edema and emphysema with rim enhancing fluid collection in the anterior ankle and extending along the great toe metatarsal, consistent with abscess. 3. Atherosclerosis throughout the right lower extremity vasculature, which appears patent.        Electronically signed by RAFAEL Malone CNP on 9/4/2022 at 3:18 PM

## 2022-09-04 NOTE — ANESTHESIA PRE PROCEDURE
Department of Anesthesiology  Preprocedure Note       Name:  Noemi Lloyd   Age:  39 y.o.  :  1980                                          MRN:  [id number]         Date:  2022      Surgeon: Jessie Mohs):  Car Helton MD    Procedure: Procedure(s):  LEG AMPUTATION BELOW KNEE    Medications prior to admission:   Prior to Admission medications    Medication Sig Start Date End Date Taking?  Authorizing Provider   aspirin 81 MG chewable tablet Take 81 mg by mouth daily    Historical Provider, MD   metroNIDAZOLE (FLAGYL) 500 MG tablet Take 1 tablet by mouth 3 times daily for 28 days 22  Venice Camacho MD   pantoprazole (PROTONIX) 20 MG tablet Take 1 tablet by mouth every morning (before breakfast) 22   Venice Camacho MD   metFORMIN (GLUCOPHAGE) 500 MG tablet Take 2 tablets by mouth 2 times daily (with meals) Indications: Start tomorrow 03/14 9/2/22 3/1/23  Venice Camacho MD   ondansetron (ZOFRAN-ODT) 4 MG disintegrating tablet Take 1 tablet by mouth every 8 hours as needed for Nausea or Vomiting 22   RAFAEL Reveles CNP   metoclopramide (REGLAN) 10 MG tablet Take 1 tablet by mouth 4 times daily (before meals and nightly) 22  RAFAEL Reveles CNP   sucralfate (CARAFATE) 1 GM/10ML suspension Take 10 mLs by mouth 4 times daily for 7 days 22  RAFAEL Hernandez CNP   glyBURIDE (DIABETA) 5 MG tablet Take 2 tablets by mouth in the morning and at bedtime 22   RAFAEL Hernandez CNP   Dulaglutide 1.5 MG/0.5ML SOPN Inject 1.5 mg into the skin once a week 22   RAFAEL Hernandez CNP   metoprolol succinate (TOPROL XL) 25 MG extended release tablet Take 1 tablet by mouth in the morning. 22   RAFAEL Hernandez CNP   losartan (COZAAR) 25 MG tablet Take 1 tablet by mouth in the morning. 7/29/22 10/27/22  RAFAEL Hernandez - CNP   omeprazole (PRILOSEC) 20 MG delayed release capsule Take 1 capsule by mouth once daily in the morning 22 9/2/22  RAFAEL Mclean CNP   FLUoxetine (PROZAC) 40 MG capsule Take 2 capsules by mouth in the morning. 7/28/22 8/27/22  RAFAEL Mclean CNP   insulin glargine (LANTUS SOLOSTAR) 100 UNIT/ML injection pen Inject 30 Units into the skin nightly 6/30/22 9/28/22  Nuvia Camara MD   collagenase 250 UNIT/GM ointment Per instructions DAILY (route: topical) 5/23/22   Historical Provider, MD   Alcohol Swabs PADS  4/27/22   Historical Provider, MD   pioglitazone (ACTOS) 30 MG tablet Take 1 tablet by mouth daily 4/27/22 8/18/22  Scarlett Buitrago MD   atorvastatin (LIPITOR) 40 MG tablet Take 1 tablet by mouth nightly 4/27/22 8/18/22  Scarlett Buitrago MD   blood glucose monitor strips Test 2 times a day & as needed for symptoms of irregular blood glucose. Dispense sufficient amount for indicated testing frequency plus additional to accommodate PRN testing needs. 4/27/22   Scarlett Buitrago MD   blood glucose monitor kit and supplies Dispense sufficient amount for indicated testing frequency plus additional to accommodate PRN testing needs. Dispense all needed supplies to include: monitor, strips, lancing device, lancets, control solutions, alcohol swabs. 6/15/20   Alexa Barclay MD   FreeStyle Lancets MISC 1 each by Does not apply route daily 6/15/20   Alexa Barclay MD   acetaminophen (TYLENOL) 325 MG tablet Take 2 tablets by mouth every 4 hours as needed for Pain or Fever 2/28/18 9/2/22  Roddy Toledo MD       Current medications:    No current facility-administered medications for this visit. No current outpatient medications on file.      Facility-Administered Medications Ordered in Other Visits   Medication Dose Route Frequency Provider Last Rate Last Admin    vancomycin (VANCOCIN) 1,250 mg in dextrose 5 % 250 mL IVPB ([id number])  1,250 mg IntraVENous Q12H RAFAEL Mcneill CNP        sodium chloride flush 0.9 % injection 5-40 mL  5-40 mL IntraVENous 2 times per day Scout Bianchi Zahra Truong MD   10 mL at 09/04/22 1047    sodium chloride flush 0.9 % injection 5-40 mL  5-40 mL IntraVENous PRN Jimmie Oviedo MD        0.9 % sodium chloride infusion  25 mL IntraVENous PRN Jimmie Oviedo MD        fentaNYL (SUBLIMAZE) injection 25 mcg  25 mcg IntraVENous Q5 Min PRN Jimmie Oviedo MD        HYDROmorphone (DILAUDID) injection 0.5 mg  0.5 mg IntraVENous Q5 Min PRN Jimmie Oviedo MD        oxyCODONE (ROXICODONE) immediate release tablet 5 mg  5 mg Oral PRN Jimmie Oviedo MD        Or    oxyCODONE (ROXICODONE) immediate release tablet 10 mg  10 mg Oral PRN Jimmie Oviedo MD        labetalol (NORMODYNE;TRANDATE) injection 10 mg  10 mg IntraVENous Q15 Min PRN Jimmie Oviedo MD        Or    hydrALAZINE (APRESOLINE) injection 10 mg  10 mg IntraVENous Q15 Min PRN Jimmie Oviedo MD        sodium chloride flush 0.9 % injection 5-40 mL  5-40 mL IntraVENous 2 times per day Alba Dural, APRN - CNP   10 mL at 09/03/22 0915    sodium chloride flush 0.9 % injection 5-40 mL  5-40 mL IntraVENous PRN Alba Dural, APRN - CNP        0.9 % sodium chloride infusion   IntraVENous PRN Alba Dural, APRN - CNP 20 mL/hr at 09/03/22 0914 New Bag at 09/03/22 0914    ondansetron (ZOFRAN-ODT) disintegrating tablet 4 mg  4 mg Oral Q8H PRN Alba Dural, APRN - CNP        Or    ondansetron (ZOFRAN) injection 4 mg  4 mg IntraVENous Q6H PRN Alba Dural, APRN - CNP        [Held by provider] enoxaparin (LOVENOX) injection 40 mg  40 mg SubCUTAneous Daily Alba Dural, APRN - CNP   40 mg at 09/04/22 1046    cefepime (MAXIPIME) 2000 mg IVPB minibag  2,000 mg IntraVENous Q12H Alba Dural, APRN - CNP   Stopped at 09/04/22 0727    clindamycin (CLEOCIN) 900 mg in dextrose 5 % 50 mL IVPB  900 mg IntraVENous Q8H Alba Dural, APRN - CNP   Stopped at 09/04/22 1000    oxyCODONE (ROXICODONE) immediate release tablet 5 mg  5 mg Oral Q4H PRN Cynthia Bingham, APRN - CNP        Or    oxyCODONE HCl (OXY-IR) immediate release tablet 10 mg  10 mg Oral Q4H PRN Billye Rival, APRN - CNP   10 mg at 09/03/22 2158    morphine (PF) injection 1 mg  1 mg IntraVENous Q2H PRN Billye Rival, APRN - CNP        Or    morphine (PF) injection 2 mg  2 mg IntraVENous Q2H PRN Billye Rival, APRN - CNP   2 mg at 09/04/22 0043    docusate sodium (COLACE) capsule 100 mg  100 mg Oral Daily Marcell Cuello MD   100 mg at 09/04/22 1046    atorvastatin (LIPITOR) tablet 40 mg  40 mg Oral Nightly Marcell Cuello MD   40 mg at 09/03/22 2021    FLUoxetine (PROZAC) capsule 80 mg  80 mg Oral Daily Marcell Cuello MD   80 mg at 09/04/22 1046    [Held by provider] losartan (COZAAR) tablet 25 mg  25 mg Oral Daily Marcell Cuello MD        [Held by provider] metoprolol succinate (TOPROL XL) extended release tablet 25 mg  25 mg Oral Daily Marcell Cuello MD        pantoprazole (PROTONIX) tablet 20 mg  20 mg Oral QAM AC Marcell Cuello MD   20 mg at 09/04/22 0700    sucralfate (CARAFATE) tablet 1 g  1 g Oral 2 times per day Marcell Cuello MD   1 g at 09/04/22 1046    glucose chewable tablet 16 g  4 tablet Oral PRN Marcell Cuello MD        dextrose bolus 10% 125 mL  125 mL IntraVENous PRN Marcell Cuello MD        Or    dextrose bolus 10% 250 mL  250 mL IntraVENous PRN Marcell Cuello MD        glucagon (rDNA) injection 1 mg  1 mg SubCUTAneous PRN Marcell Cuello MD        dextrose 10 % infusion   IntraVENous Continuous PRN Marcell Cuello MD        insulin glargine (LANTUS) injection vial 30 Units  30 Units SubCUTAneous Nightly Asia Graff PA-C   30 Units at 09/03/22 2122    insulin lispro (HUMALOG) injection vial 0-16 Units  0-16 Units SubCUTAneous TID WC Asia Graff PA-C   4 Units at 09/04/22 1039    insulin lispro (HUMALOG) injection vial 0-4 Units  0-4 Units SubCUTAneous Nightly Asia Vance PA-C        aluminum & magnesium hydroxide-simethicone (MAALOX) 200-200-20 MG/5ML suspension 30 mL  30 mL Oral Q6H PRN Mary Rosas MD           Allergies:  No Known Allergies    Problem List:    Patient Active Problem List   Diagnosis Code    Unstable angina ([address] I20.0    Lumbar back pain with radiculopathy affecting left lower extremity M54.16    S/P lumbar laminectomy Z98.890    Type 2 diabetes mellitus, with long-term current use of insulin ([facility]) E11.9, Z79.4    Essential hypertension I10    Gastroesophageal reflux disease without esophagitis K21.9    Chest pain R07.9    Moderate nonproliferative diabetic retinopathy of both eyes without macular edema associated with type 2 diabetes mellitus ([address] P06.2118    Acute osteomyelitis ([facility]) M86.10    Acute osteomyelitis of foot ([address] M86.179    Abscess of left foot L02.612    Cellulitis of left foot L03. 116    Subacute osteomyelitis of left foot ([facility]) M86.272    Anemia associated with acute blood loss D62    Anxiety attack F41.0    WD-Diabetic ulcer of left midfoot associated with type 2 diabetes mellitus, with fat layer exposed ([address] E11.621, F90.937    Diabetic ulcer of toe of left foot associated with type 2 diabetes mellitus, with fat layer exposed ([address] E11.621, M89.246    WD-Partial nontraumatic amputation of foot, left ([facility]) D55.266    Severe episode of recurrent major depressive disorder, without psychotic features ([address] F33.2    Insomnia G47.00    Intractable vomiting with nausea R11.2    Intractable nausea and vomiting R11.2    Necrotizing fasciitis ([facility]) M72.6    Sepsis ([facility]) A41.9    Pneumatosis coli K63.89    Type 2 diabetes mellitus with right diabetic foot infection ([facility]) E11.628, L08.9    Necrotizing fasciitis of lower leg ([facility]) M72.6       Past Medical History:        Diagnosis Date    Abscess of left foot 4/22/2022    Anemia associated with acute blood loss 4/26/2022    Callus of foot     Right foot - see's Dr. Corrinne Distel    Cellulitis of left foot 4/22/2022    Status:                                                                                 BMI:   Wt Readings from Last 3 Encounters:   09/04/22 181 lb 7 oz (82.3 kg)   09/01/22 184 lb 11.9 oz (83.8 kg)   08/28/22 180 lb (81.6 kg)     There is no height or weight on file to calculate BMI.    CBC:   Lab Results   Component Value Date/Time    WBC 12.2 09/04/2022 04:08 AM    RBC 3.77 09/04/2022 04:08 AM    HGB 10.7 09/04/2022 04:08 AM    HCT 34.2 09/04/2022 04:08 AM    MCV 90.7 09/04/2022 04:08 AM    RDW 15.3 09/04/2022 04:08 AM     09/04/2022 04:08 AM       CMP:   Lab Results   Component Value Date/Time     09/04/2022 04:08 AM    K 3.6 09/04/2022 04:08 AM    K 3.8 03/14/2018 06:01 AM     09/04/2022 04:08 AM    CO2 27 09/04/2022 04:08 AM    BUN 11 09/04/2022 04:08 AM    CREATININE 0.8 09/04/2022 04:08 AM    GFRAA >60 09/04/2022 04:08 AM    AGRATIO 1.4 03/12/2020 08:26 AM    LABGLOM >60 09/04/2022 04:08 AM    GLUCOSE 221 09/04/2022 04:08 AM    PROT 6.4 09/04/2022 04:08 AM    CALCIUM 7.5 09/04/2022 04:08 AM    BILITOT 0.3 09/04/2022 04:08 AM    ALKPHOS 73 09/04/2022 04:08 AM    AST 24 09/04/2022 04:08 AM    ALT 15 09/04/2022 04:08 AM       POC Tests:   Recent Labs     09/04/22  1219   POCGLU 164*       Coags:   Lab Results   Component Value Date/Time    PROTIME 11.5 05/20/2022 11:04 PM    INR 0.89 05/20/2022 11:04 PM    APTT 27.9 05/20/2022 11:04 PM       HCG (If Applicable): No results found for: PREGTESTUR, PREGSERUM, HCG, HCGQUANT     ABGs: No results found for: PHART, PO2ART, LGW4MMC, XNM7TQX, BEART, S3FNJBRF     Type & Screen (If Applicable):  Lab Results   Component Value Date    LABRH POS 04/17/2018       Drug/Infectious Status (If Applicable):  No results found for: HIV, HEPCAB    COVID-19 Screening (If Applicable):   Lab Results   Component Value Date/Time    COVID19 NOT DETECTED 07/06/2022 03:13 AM    COVID19 NOT DETECTED 06/09/2021 01:50 PM           Anesthesia Evaluation  Patient summary reviewed no history of anesthetic complications:   Airway: Mallampati: II  TM distance: >3 FB   Neck ROM: full  Mouth opening: > = 3 FB   Dental:          Pulmonary:normal exam    (+) current smoker                           Cardiovascular:  Exercise tolerance: no interval change,   (+) hypertension:, angina:,       ECG reviewed      Echocardiogram reviewed         Beta Blocker:  Dose within 24 Hrs      ROS comment: 2021 [facility]  Procedure Type      Procedure Summary   normal coronaries and normal LVEF      Recommendations   Optimize medications      Signatures    2022 echo   Summary   Left ventricular systolic function is normal.   Ejection fraction is visually estimated at 55%. No significant valvular disease noted. No evidence of any pericardial effusion     Neuro/Psych:   (+) neuromuscular disease:, psychiatric history: stable with treatment            GI/Hepatic/Renal:   (+) GERD: no interval change,           Endo/Other:    (+) DiabetesType II DM, no interval change, , .                  ROS comment: left foot osteomyelitis. Abdominal:             Vascular:   + DVT, .        ROS comment: DVT left lower extremity acute          Doppler shows occlusive left peroneal deep venous thrombosis         Risk factors include prolonged immobility, infection. Seen on venous duplex LLE. Other Findings:             Anesthesia Plan      general     ASA 3     ( Pre Anesthesia Assessment complete. Chart reviewed on 9/4/2022)  Induction: intravenous. MIPS: Postoperative opioids intended and Prophylactic antiemetics administered. Anesthetic plan and risks discussed with patient. Plan discussed with CRNA.     Attending anesthesiologist reviewed and agrees with [address] [place] [address] Avenue, APRN - CRNA   9/4/2022

## 2022-09-04 NOTE — PROGRESS NOTES
Occupational Therapy  [facility] ACUTE CARE OCCUPATIONAL THERAPY EVALUATION    Estrella Lantigua, 1980, 2118/2118-A, 9/4/2022    Discharge Recommendation: Inpatient Rehabilitation      History:  [place]:  The primary encounter diagnosis was Necrotizing fasciitis ([address] A diagnosis of Necrosis ([address] was also pertinent to this visit.   Past Medical History:   Diagnosis Date    Abscess of left foot 4/22/2022    Anemia associated with acute blood loss 4/26/2022    Callus of foot     Right foot - see's Dr. Jessica Quintana    Cellulitis of left foot 4/22/2022    Diabetic ulcer of left midfoot associated with type 2 diabetes mellitus, with muscle involvement without evidence of necrosis ([address] 4/26/2022    Diabetic ulcer of left midfoot associated with type 2 diabetes mellitus, with necrosis of muscle ([address] 6/7/2021    Diabetic ulcer of right midfoot associated with type 2 diabetes mellitus, with fat layer exposed ([address] 8/11/2020    Dizziness     positional    Essential hypertension     Follows with PCP    Hyperlipidemia     Lumbar radiculopathy     Septic embolism ([address] 6/13/2020    Subacute osteomyelitis of left foot ([address] 4/22/2022         Subjective:  Patient states: \"I'm pretty used to not using this R leg\"  Pain:  denies at rest  Communication with other providers: co-eval w/ PT, handoff to RN  Restrictions: General Precautions, Fall Risk, R BKA    Home Setup/Prior level of function:  Social/Functional History  Lives With: Alone  Type of Home: House  Home Layout: Two level, Able to Live on Main level with bedroom/bathroom  Home Access: Ramped entrance  Bathroom Shower/Tub: Tub/Shower unit  Bathroom Toilet: Standard  Bathroom Equipment: Tub transfer bench  Home Equipment: melissa Henderson Juliana Guitar  Has the patient had two or more falls in the past year or any fall with injury in the past year?: Yes (3-4)  ADL Assistance: Independent  Homemaking Assistance: Independent  Ambulation Assistance: Independent  Transfer Assistance: Independent ([address] vs RW)  Active : Yes  Occupation: On disability     Examination:  Observation: Supine in bed upon arrival, agreeable to therapy eval.  Vision: WFL  Hearing: WFL  Vitals: Stable vitals throughout session on room air      Body Systems and functions:  ROM: WFL   Strength: B UE grossly 4+/5 across all major joints   Sensation: WFL  Tone: Normal  Coordination: WFL  Perception: WNL      Cognitive and Psychosocial Functioning:  Overall cognitive status: alert and oriented, WFL  Affect: Normal       Functional Mobility:  Bed mobility:  supine to sitting EOB w/ SBA  Sitting balance:  SBA static and dynamic    Transfers: SPT from bed <> w/c w/ min A and bed < recliner w/ CGA. Vcs for sequencing/safety and hand placement  Standing balance:  NT  Functional Mobility: self propelled w/c long functional distance w/ SBA  Toilet/Shower Transfers: NT        Activities of Daily Living (ADLs):  Feeding: set up A  Grooming: SBA  UB bathing: min A  LB bathing: min A  UB dressing: SBA  LB dressing: SBA to don L sock while seated EOB  Toileting: min A    *ADL determined per observation of functional mobility, balance, activity tolerance, cognition, or actual ADL performance. AM-PAC 6 click short form for inpatient daily activity:   How much help from another person does the patient currently need. .. Unable  Dep A Lot  Max A A Lot   Mod A A Little  Min A A Little   CGA  SBA None   Mod I  Indep  Sup   1. Putting on and taking off regular lower body clothing? [] 1    [] 2   [] 2   [] 3   [x] 3   [] 4      2. Bathing (including washing, rinsing, drying)? [] 1   [] 2   [] 2 [x] 3 [] 3 [] 4   3. Toileting, which includes using toilet, bedpan, or urinal? [] 1    [] 2   [] 2   [x] 3   [] 3   [] 4     4. Putting on and taking off regular upper body clothing? [] 1   [] 2   [] 2   [] 3   [x] 3    [] 4      5. Taking care of personal grooming such as brushing teeth?  [] 1   [] 2    [] 2 [] 3 [x] 3   [] 4      6. Eating meals? [] 1   [] 2   [] 2   [] 3   [] 3   [x] 4        Raw Score:  19     [24=0% impaired(CH), 23=1-19%(CI), 20-22=20-39%(CJ), 15-19=40-59%(CK), 10-14=60-79%(CL), 7-9=80-99%(CM), 6=100%(CN)]     Treatment:    Therapeutic Activity Training:   Therapeutic activity training was instructed today. Cues were given for safety, sequence, UE/LE placement, awareness, and balance. Activities performed today included bed mobility training, sup-sit, sit-stand, SPT, self propelling w/c long functional distance. Educated pt on role of OT, therapy POC and functional goals, progression w/ ADLs and transfers, importance of movement and OOB activity, d/c recommendations     Safety Measures: Gait belt used, Left in recliner, Alarm in place  Recommendations for NURSING activity:  Up to chair for all 3 meals and up to bathroom for all toileting needs     Assessment:  Pt is a 38 yo M admitted d/t necrotizing fasciitis. R BKA 9/2, POD 2. Pt at baseline is IND for ADLs, IND for high level IADLs, and IND for functional transfers/mobility w/ SPC vs rollator. Pt currently presents w/ deficits in ADL and high level IADL independence, functional ADL transfers, strength, and functional activity tolerance. Continued OT services recommended to increase safety and independence with ADL routine and to address remaining functional deficits. Pt would benefit from continued acute care OT services w/ discharge to ARU. Complexity: Moderate  Prognosis: Good, no significant barriers to participation at this time.    Plan  Times per Week: 2+  Current Treatment Recommendations: Strengthening, ROM, Functional mobility training, Endurance training, Pain management, Safety education & training, Patient/Caregiver education & training, Equipment evaluation, education, & procurement, Positioning, Self-Care / ADL, Home management training         Goals:  Pt will complete all aspects of bed mobility for EOB/OOB ADLs w/ mod I.  Pt will complete UB ADLs w/ mod I. Pt will complete LB ADLs w/ mod I. Pt will complete all functional transfers to and from bed, chair, toilet, shower chair w/ mod I. Pt will ambulate functional household distance w/ mod I. Pt will complete all aspects of toileting task w/ mod I. Pt will perform therex/theract in order to increase strength and functional activity tolerance necessary for increased independence w/ ADL routine.     Pt goal: go home, get stronger  Time Frame for STGs: discharge    Equipment: Continue to assess at next LOC    Time:   Time in: 1536  Time out: 1604  Total time: 28  Timed treatment minutes: 18        Electronically signed by:      MORENITA Cruz/DANY  [id number]

## 2022-09-05 ENCOUNTER — ANESTHESIA (OUTPATIENT)
Dept: OPERATING ROOM | Age: 42
DRG: 710 | End: 2022-09-05
Payer: COMMERCIAL

## 2022-09-05 LAB
ANION GAP SERPL CALCULATED.3IONS-SCNC: 8 MMOL/L (ref 4–16)
BUN BLDV-MCNC: 9 MG/DL (ref 6–23)
CALCIUM SERPL-MCNC: 7.3 MG/DL (ref 8.3–10.6)
CHLORIDE BLD-SCNC: 97 MMOL/L (ref 99–110)
CO2: 28 MMOL/L (ref 21–32)
CREAT SERPL-MCNC: 0.7 MG/DL (ref 0.9–1.3)
GFR AFRICAN AMERICAN: >60 ML/MIN/1.73M2
GFR NON-AFRICAN AMERICAN: >60 ML/MIN/1.73M2
GLUCOSE BLD-MCNC: 179 MG/DL (ref 70–99)
GLUCOSE BLD-MCNC: 179 MG/DL (ref 70–99)
GLUCOSE BLD-MCNC: 192 MG/DL (ref 70–99)
GLUCOSE BLD-MCNC: 240 MG/DL (ref 70–99)
HCT VFR BLD CALC: 35.1 % (ref 42–52)
HEMOGLOBIN: 11.3 GM/DL (ref 13.5–18)
MCH RBC QN AUTO: 28.8 PG (ref 27–31)
MCHC RBC AUTO-ENTMCNC: 32.2 % (ref 32–36)
MCV RBC AUTO: 89.5 FL (ref 78–100)
PDW BLD-RTO: 15.5 % (ref 11.7–14.9)
PLATELET # BLD: 562 K/CU MM (ref 140–440)
PMV BLD AUTO: 8.5 FL (ref 7.5–11.1)
POTASSIUM SERPL-SCNC: 3.6 MMOL/L (ref 3.5–5.1)
RBC # BLD: 3.92 M/CU MM (ref 4.6–6.2)
SODIUM BLD-SCNC: 133 MMOL/L (ref 135–145)
WBC # BLD: 9.5 K/CU MM (ref 4–10.5)

## 2022-09-05 PROCEDURE — 6370000000 HC RX 637 (ALT 250 FOR IP): Performed by: NURSE PRACTITIONER

## 2022-09-05 PROCEDURE — 2580000003 HC RX 258

## 2022-09-05 PROCEDURE — 2700000000 HC OXYGEN THERAPY PER DAY

## 2022-09-05 PROCEDURE — 6360000002 HC RX W HCPCS: Performed by: NURSE PRACTITIONER

## 2022-09-05 PROCEDURE — 3600000013 HC SURGERY LEVEL 3 ADDTL 15MIN: Performed by: SURGERY

## 2022-09-05 PROCEDURE — 3600000003 HC SURGERY LEVEL 3 BASE: Performed by: SURGERY

## 2022-09-05 PROCEDURE — 2500000003 HC RX 250 WO HCPCS: Performed by: NURSE PRACTITIONER

## 2022-09-05 PROCEDURE — 94761 N-INVAS EAR/PLS OXIMETRY MLT: CPT

## 2022-09-05 PROCEDURE — 3700000001 HC ADD 15 MINUTES (ANESTHESIA): Performed by: SURGERY

## 2022-09-05 PROCEDURE — 2580000003 HC RX 258: Performed by: ANESTHESIOLOGY

## 2022-09-05 PROCEDURE — L1830 KO IMMOB CANVAS LONG PRE OTS: HCPCS | Performed by: SURGERY

## 2022-09-05 PROCEDURE — 88307 TISSUE EXAM BY PATHOLOGIST: CPT

## 2022-09-05 PROCEDURE — 2500000003 HC RX 250 WO HCPCS

## 2022-09-05 PROCEDURE — 0Y6F0ZZ DETACHMENT AT RIGHT KNEE REGION, OPEN APPROACH: ICD-10-PCS | Performed by: SURGERY

## 2022-09-05 PROCEDURE — 2580000003 HC RX 258: Performed by: NURSE PRACTITIONER

## 2022-09-05 PROCEDURE — 85027 COMPLETE CBC AUTOMATED: CPT

## 2022-09-05 PROCEDURE — 80048 BASIC METABOLIC PNL TOTAL CA: CPT

## 2022-09-05 PROCEDURE — 3700000000 HC ANESTHESIA ATTENDED CARE: Performed by: SURGERY

## 2022-09-05 PROCEDURE — 99999 PR OFFICE/OUTPT VISIT,PROCEDURE ONLY: CPT | Performed by: NURSE PRACTITIONER

## 2022-09-05 PROCEDURE — 82962 GLUCOSE BLOOD TEST: CPT

## 2022-09-05 PROCEDURE — 2000000000 HC ICU R&B

## 2022-09-05 PROCEDURE — 2709999900 HC NON-CHARGEABLE SUPPLY: Performed by: SURGERY

## 2022-09-05 PROCEDURE — 6360000002 HC RX W HCPCS: Performed by: SURGERY

## 2022-09-05 PROCEDURE — 7100000000 HC PACU RECOVERY - FIRST 15 MIN

## 2022-09-05 PROCEDURE — 6360000002 HC RX W HCPCS

## 2022-09-05 PROCEDURE — 36415 COLL VENOUS BLD VENIPUNCTURE: CPT

## 2022-09-05 PROCEDURE — 27886 AMPUTATION FOLLOW-UP SURGERY: CPT | Performed by: SURGERY

## 2022-09-05 RX ORDER — IPRATROPIUM BROMIDE AND ALBUTEROL SULFATE 2.5; .5 MG/3ML; MG/3ML
1 SOLUTION RESPIRATORY (INHALATION)
Status: DISCONTINUED | OUTPATIENT
Start: 2022-09-05 | End: 2022-09-05

## 2022-09-05 RX ORDER — ENOXAPARIN SODIUM 100 MG/ML
40 INJECTION SUBCUTANEOUS DAILY
Status: DISCONTINUED | OUTPATIENT
Start: 2022-09-06 | End: 2022-09-09 | Stop reason: HOSPADM

## 2022-09-05 RX ORDER — DIPHENHYDRAMINE HYDROCHLORIDE 50 MG/ML
12.5 INJECTION INTRAMUSCULAR; INTRAVENOUS
Status: ACTIVE | OUTPATIENT
Start: 2022-09-05 | End: 2022-09-05

## 2022-09-05 RX ORDER — LORAZEPAM 2 MG/ML
0.5 INJECTION INTRAMUSCULAR
Status: ACTIVE | OUTPATIENT
Start: 2022-09-05 | End: 2022-09-05

## 2022-09-05 RX ORDER — DEXAMETHASONE SODIUM PHOSPHATE 4 MG/ML
INJECTION, SOLUTION INTRA-ARTICULAR; INTRALESIONAL; INTRAMUSCULAR; INTRAVENOUS; SOFT TISSUE PRN
Status: DISCONTINUED | OUTPATIENT
Start: 2022-09-05 | End: 2022-09-05

## 2022-09-05 RX ORDER — LIDOCAINE HYDROCHLORIDE 20 MG/ML
INJECTION, SOLUTION INTRAVENOUS PRN
Status: DISCONTINUED | OUTPATIENT
Start: 2022-09-05 | End: 2022-09-05 | Stop reason: SDUPTHER

## 2022-09-05 RX ORDER — HYDRALAZINE HYDROCHLORIDE 20 MG/ML
10 INJECTION INTRAMUSCULAR; INTRAVENOUS
Status: DISCONTINUED | OUTPATIENT
Start: 2022-09-05 | End: 2022-09-09 | Stop reason: HOSPADM

## 2022-09-05 RX ORDER — SODIUM CHLORIDE, SODIUM LACTATE, POTASSIUM CHLORIDE, CALCIUM CHLORIDE 600; 310; 30; 20 MG/100ML; MG/100ML; MG/100ML; MG/100ML
INJECTION, SOLUTION INTRAVENOUS CONTINUOUS
Status: DISCONTINUED | OUTPATIENT
Start: 2022-09-05 | End: 2022-09-09 | Stop reason: HOSPADM

## 2022-09-05 RX ORDER — KETAMINE HCL 50MG/ML(1)
SYRINGE (ML) INTRAVENOUS PRN
Status: DISCONTINUED | OUTPATIENT
Start: 2022-09-05 | End: 2022-09-05 | Stop reason: SDUPTHER

## 2022-09-05 RX ORDER — MEPERIDINE HYDROCHLORIDE 25 MG/ML
12.5 INJECTION INTRAMUSCULAR; INTRAVENOUS; SUBCUTANEOUS ONCE
Status: DISCONTINUED | OUTPATIENT
Start: 2022-09-05 | End: 2022-09-09 | Stop reason: HOSPADM

## 2022-09-05 RX ORDER — PROPOFOL 10 MG/ML
INJECTION, EMULSION INTRAVENOUS PRN
Status: DISCONTINUED | OUTPATIENT
Start: 2022-09-05 | End: 2022-09-05 | Stop reason: SDUPTHER

## 2022-09-05 RX ORDER — SODIUM CHLORIDE 9 MG/ML
INJECTION, SOLUTION INTRAVENOUS CONTINUOUS PRN
Status: DISCONTINUED | OUTPATIENT
Start: 2022-09-05 | End: 2022-09-05 | Stop reason: SDUPTHER

## 2022-09-05 RX ORDER — SODIUM CHLORIDE 0.9 % (FLUSH) 0.9 %
5-40 SYRINGE (ML) INJECTION EVERY 12 HOURS SCHEDULED
Status: DISCONTINUED | OUTPATIENT
Start: 2022-09-05 | End: 2022-09-09 | Stop reason: HOSPADM

## 2022-09-05 RX ORDER — MIDAZOLAM HYDROCHLORIDE 1 MG/ML
INJECTION INTRAMUSCULAR; INTRAVENOUS PRN
Status: DISCONTINUED | OUTPATIENT
Start: 2022-09-05 | End: 2022-09-05 | Stop reason: SDUPTHER

## 2022-09-05 RX ORDER — ONDANSETRON 2 MG/ML
INJECTION INTRAMUSCULAR; INTRAVENOUS PRN
Status: DISCONTINUED | OUTPATIENT
Start: 2022-09-05 | End: 2022-09-05 | Stop reason: SDUPTHER

## 2022-09-05 RX ORDER — ONDANSETRON 2 MG/ML
4 INJECTION INTRAMUSCULAR; INTRAVENOUS
Status: ACTIVE | OUTPATIENT
Start: 2022-09-05 | End: 2022-09-05

## 2022-09-05 RX ORDER — OXYCODONE HYDROCHLORIDE 5 MG/1
5 TABLET ORAL
Status: ACTIVE | OUTPATIENT
Start: 2022-09-05 | End: 2022-09-05

## 2022-09-05 RX ORDER — ROCURONIUM BROMIDE 10 MG/ML
INJECTION, SOLUTION INTRAVENOUS PRN
Status: DISCONTINUED | OUTPATIENT
Start: 2022-09-05 | End: 2022-09-05 | Stop reason: SDUPTHER

## 2022-09-05 RX ORDER — FENTANYL CITRATE 50 UG/ML
50 INJECTION, SOLUTION INTRAMUSCULAR; INTRAVENOUS EVERY 5 MIN PRN
Status: DISCONTINUED | OUTPATIENT
Start: 2022-09-05 | End: 2022-09-09 | Stop reason: HOSPADM

## 2022-09-05 RX ORDER — DEXTROSE MONOHYDRATE 100 MG/ML
INJECTION, SOLUTION INTRAVENOUS CONTINUOUS PRN
Status: DISCONTINUED | OUTPATIENT
Start: 2022-09-05 | End: 2022-09-09 | Stop reason: HOSPADM

## 2022-09-05 RX ORDER — SODIUM CHLORIDE 0.9 % (FLUSH) 0.9 %
5-40 SYRINGE (ML) INJECTION PRN
Status: DISCONTINUED | OUTPATIENT
Start: 2022-09-05 | End: 2022-09-09 | Stop reason: HOSPADM

## 2022-09-05 RX ORDER — SODIUM CHLORIDE 9 MG/ML
25 INJECTION, SOLUTION INTRAVENOUS PRN
Status: DISCONTINUED | OUTPATIENT
Start: 2022-09-05 | End: 2022-09-09 | Stop reason: HOSPADM

## 2022-09-05 RX ORDER — PROCHLORPERAZINE EDISYLATE 5 MG/ML
5 INJECTION INTRAMUSCULAR; INTRAVENOUS
Status: ACTIVE | OUTPATIENT
Start: 2022-09-05 | End: 2022-09-05

## 2022-09-05 RX ADMIN — SODIUM CHLORIDE: 900 INJECTION INTRAVENOUS at 07:37

## 2022-09-05 RX ADMIN — SUGAMMADEX 200 MG: 100 INJECTION, SOLUTION INTRAVENOUS at 09:30

## 2022-09-05 RX ADMIN — FLUOXETINE 80 MG: 20 CAPSULE ORAL at 12:14

## 2022-09-05 RX ADMIN — MIDAZOLAM 2 MG: 1 INJECTION INTRAMUSCULAR; INTRAVENOUS at 07:40

## 2022-09-05 RX ADMIN — Medication 25 MG: at 07:46

## 2022-09-05 RX ADMIN — Medication 25 MG: at 08:06

## 2022-09-05 RX ADMIN — CEFEPIME HYDROCHLORIDE 2000 MG: 2 INJECTION, POWDER, FOR SOLUTION INTRAMUSCULAR; INTRAVENOUS at 17:55

## 2022-09-05 RX ADMIN — CLINDAMYCIN IN 5 PERCENT DEXTROSE 900 MG: 18 INJECTION, SOLUTION INTRAVENOUS at 19:34

## 2022-09-05 RX ADMIN — PROPOFOL 200 MG: 10 INJECTION, EMULSION INTRAVENOUS at 07:46

## 2022-09-05 RX ADMIN — MORPHINE SULFATE 2 MG: 2 INJECTION, SOLUTION INTRAMUSCULAR; INTRAVENOUS at 09:50

## 2022-09-05 RX ADMIN — SODIUM CHLORIDE, PRESERVATIVE FREE 10 ML: 5 INJECTION INTRAVENOUS at 20:35

## 2022-09-05 RX ADMIN — CLINDAMYCIN IN 5 PERCENT DEXTROSE 900 MG: 18 INJECTION, SOLUTION INTRAVENOUS at 11:02

## 2022-09-05 RX ADMIN — VANCOMYCIN HYDROCHLORIDE 1250 MG: 1.25 INJECTION, POWDER, LYOPHILIZED, FOR SOLUTION INTRAVENOUS at 20:42

## 2022-09-05 RX ADMIN — OXYCODONE HYDROCHLORIDE 10 MG: 10 TABLET ORAL at 14:57

## 2022-09-05 RX ADMIN — SODIUM CHLORIDE, PRESERVATIVE FREE 10 ML: 5 INJECTION INTRAVENOUS at 19:38

## 2022-09-05 RX ADMIN — LIDOCAINE HYDROCHLORIDE 100 MG: 20 INJECTION, SOLUTION INTRAVENOUS at 07:46

## 2022-09-05 RX ADMIN — HYDROMORPHONE HYDROCHLORIDE 0.5 MG: 1 INJECTION, SOLUTION INTRAMUSCULAR; INTRAVENOUS; SUBCUTANEOUS at 22:44

## 2022-09-05 RX ADMIN — VANCOMYCIN HYDROCHLORIDE 1250 MG: 1.25 INJECTION, POWDER, LYOPHILIZED, FOR SOLUTION INTRAVENOUS at 07:40

## 2022-09-05 RX ADMIN — ROCURONIUM BROMIDE 50 MG: 10 INJECTION INTRAVENOUS at 07:46

## 2022-09-05 RX ADMIN — INSULIN GLARGINE 30 UNITS: 100 INJECTION, SOLUTION SUBCUTANEOUS at 20:32

## 2022-09-05 RX ADMIN — CEFEPIME HYDROCHLORIDE 2000 MG: 2 INJECTION, POWDER, FOR SOLUTION INTRAMUSCULAR; INTRAVENOUS at 06:47

## 2022-09-05 RX ADMIN — ONDANSETRON 4 MG: 2 INJECTION INTRAMUSCULAR; INTRAVENOUS at 09:21

## 2022-09-05 RX ADMIN — CLINDAMYCIN IN 5 PERCENT DEXTROSE 900 MG: 18 INJECTION, SOLUTION INTRAVENOUS at 03:26

## 2022-09-05 RX ADMIN — SODIUM CHLORIDE, POTASSIUM CHLORIDE, SODIUM LACTATE AND CALCIUM CHLORIDE: 600; 310; 30; 20 INJECTION, SOLUTION INTRAVENOUS at 11:00

## 2022-09-05 RX ADMIN — SUCRALFATE 1 G: 1 TABLET ORAL at 19:38

## 2022-09-05 RX ADMIN — SODIUM CHLORIDE, PRESERVATIVE FREE 10 ML: 5 INJECTION INTRAVENOUS at 12:15

## 2022-09-05 RX ADMIN — OXYCODONE HYDROCHLORIDE 10 MG: 10 TABLET ORAL at 19:38

## 2022-09-05 RX ADMIN — MORPHINE SULFATE 2 MG: 2 INJECTION, SOLUTION INTRAMUSCULAR; INTRAVENOUS at 12:09

## 2022-09-05 RX ADMIN — HYDROMORPHONE HYDROCHLORIDE 0.5 MG: 1 INJECTION, SOLUTION INTRAMUSCULAR; INTRAVENOUS; SUBCUTANEOUS at 10:16

## 2022-09-05 RX ADMIN — INSULIN LISPRO 4 UNITS: 100 INJECTION, SOLUTION INTRAVENOUS; SUBCUTANEOUS at 12:29

## 2022-09-05 RX ADMIN — SUCRALFATE 1 G: 1 TABLET ORAL at 12:15

## 2022-09-05 RX ADMIN — ATORVASTATIN CALCIUM 40 MG: 40 TABLET, FILM COATED ORAL at 19:38

## 2022-09-05 ASSESSMENT — PAIN DESCRIPTION - DESCRIPTORS
DESCRIPTORS: ACHING;CRAMPING
DESCRIPTORS: ACHING;DISCOMFORT;DULL
DESCRIPTORS: ACHING;DISCOMFORT;DULL
DESCRIPTORS: ACHING;DULL
DESCRIPTORS: STABBING;ACHING

## 2022-09-05 ASSESSMENT — PAIN DESCRIPTION - ORIENTATION
ORIENTATION: RIGHT

## 2022-09-05 ASSESSMENT — PAIN DESCRIPTION - PAIN TYPE
TYPE: SURGICAL PAIN

## 2022-09-05 ASSESSMENT — PAIN SCALES - GENERAL
PAINLEVEL_OUTOF10: 3
PAINLEVEL_OUTOF10: 5
PAINLEVEL_OUTOF10: 9
PAINLEVEL_OUTOF10: 7
PAINLEVEL_OUTOF10: 7
PAINLEVEL_OUTOF10: 8
PAINLEVEL_OUTOF10: 10
PAINLEVEL_OUTOF10: 7
PAINLEVEL_OUTOF10: 5
PAINLEVEL_OUTOF10: 3
PAINLEVEL_OUTOF10: 2
PAINLEVEL_OUTOF10: 10

## 2022-09-05 ASSESSMENT — PAIN DESCRIPTION - ONSET
ONSET: AWAKENED FROM SLEEP
ONSET: ON-GOING
ONSET: ON-GOING

## 2022-09-05 ASSESSMENT — PAIN - FUNCTIONAL ASSESSMENT
PAIN_FUNCTIONAL_ASSESSMENT: PREVENTS OR INTERFERES WITH MANY ACTIVE NOT PASSIVE ACTIVITIES
PAIN_FUNCTIONAL_ASSESSMENT: PREVENTS OR INTERFERES WITH MANY ACTIVE NOT PASSIVE ACTIVITIES
PAIN_FUNCTIONAL_ASSESSMENT: PREVENTS OR INTERFERES SOME ACTIVE ACTIVITIES AND ADLS
PAIN_FUNCTIONAL_ASSESSMENT: PREVENTS OR INTERFERES WITH MANY ACTIVE NOT PASSIVE ACTIVITIES
PAIN_FUNCTIONAL_ASSESSMENT: PREVENTS OR INTERFERES SOME ACTIVE ACTIVITIES AND ADLS

## 2022-09-05 ASSESSMENT — PAIN DESCRIPTION - LOCATION
LOCATION: LEG

## 2022-09-05 ASSESSMENT — PAIN DESCRIPTION - FREQUENCY
FREQUENCY: CONTINUOUS

## 2022-09-05 NOTE — PROGRESS NOTES
[address] [facility]  consulted by KAMI Armstrong for monitoring and adjustment. Indication for treatment: Necrotizing fasciitis POD1 guillotine BKA  Goal trough: [] 10-15 mcg/mL or [x] 15-20 mcg/ml  AUC/LIDIA: [] <500 or [x] 400-600    Pertinent Laboratory Values:   Temp Readings from Last 3 Encounters:   09/05/22 97.6 °F (36.4 °C) (Oral)   09/02/22 98.6 °F (37 °C) (Oral)   08/30/22 99 °F (37.2 °C) (Temporal)     Recent Labs     09/02/22  1845 09/04/22  0408 09/05/22  0537   WBC 13.9* 12.2* 9.5   LACTATE 1.4  --   --        Recent Labs     09/02/22  1845 09/04/22  0408 09/05/22  0537   BUN 6 11 9   CREATININE 1.0 0.8* 0.7*       Estimated Creatinine Clearance: 152 mL/min (A) (based on SCr of 0.7 mg/dL (L)). Intake/Output Summary (Last 24 hours) at 9/5/2022 1145  Last data filed at 9/5/2022 0936  Gross per 24 hour   Intake 950 ml   Output 1935 ml   Net -985 ml         Pertinent Cultures:  Date    Source    Results  9/02   Blood x 2   NGTD      Vancomycin level:   RANDOM:    Recent Labs     09/03/22  0745   VANCORANDOM 10.3       Assessment:  Renal function stable, UOP good. Pt afebrile with stable leukocytosis  S/p right leg guillotine BKA plans to return to OR for revision to closed BKA. Per surgery continue abx for now. Likely de-escalate once source control complete and pt no longer needs surgical intervention. Day(s) of therapy: 4 of 7  Vancomycin level: 10.3 (12 hours post dose)   with ssTrough 11    Plan:  Received vancomycin 2000 mg x 1 in the ED- on  9/2. CONTINUE SAME DOSE AND FREQUENCY =   Vanco 1250mg IV q12 hours- to target  and trough 13  Pharmacy will continue to monitor patient and adjust therapy as indicated    Abram 3 9/06 @0600    Thank you for the consult.   He Peck [facility]

## 2022-09-05 NOTE — PROGRESS NOTES
Physical Therapy  Facility/Department: [facility] ICU  Physical Therapy Initial Assessment    Name: Estrella Lantigua  : 1980  MRN: [id number]  Date of Service: 2022    Discharge Recommendations:  IP Rehab              Assessment   Assessment: Pt is a 39 y.o. male with medical history, surgical history, co-morbidities, and personal factors including Abscess of left foot, Anemia associated with acute blood loss, Callus of foot, Cellulitis of left foot, Diabetic ulcer of left midfoot associated with type 2 diabetes mellitus, with muscle involvement without evidence of necrosis ([address] Diabetic ulcer of left midfoot associated with type 2 diabetes mellitus, with necrosis of muscle ([address] Diabetic ulcer of right midfoot associated with type 2 diabetes mellitus, with fat layer exposed ([address] Dizziness, Essential hypertension, Hyperlipidemia, Lumbar radiculopathy, Septic embolism ([address] Subacute osteomyelitis of left foot ([address] Cardiac catheterization (2018); Oral tooth extraction; Dental surgery; pr office/outpt visit,procedure only (Left, 2018); lumbar laminectomy (); Toe amputation (Right, 2021); Achilles tendon surgery (Right, 2021); hernia repair (Bilateral, 2020); and Toe amputation (Left, 2022) with admission for necrotizing fasciitis and s/p R LEG AMPUTATION BELOW KNEE. Prior to admission, pt was independent with functional mobility and ADLs. Examination of body systems reveals decreased strength, decreased balance, decreased aerobic capacity, and decreased independence with functional mobility.         Therapy Prognosis: Good  Decision Making: High Complexity  Clinical Presentation: unpredictable characteristics  Requires PT Follow-Up: Yes  Activity Tolerance  Activity Tolerance: Patient tolerated evaluation without incident     Plan   Plan  Plan: 5-7 times per week  Current Treatment Recommendations: Strengthening, ROM, Balance training, Functional mobility training, Transfer training, ADL/Self-care training, IADL training, Cognitive/Perceptual training, Endurance training, Wheelchair mobility training, Gait training, Neuromuscular re-education, Safety education & training, Therapeutic activities, Patient/Caregiver education & training, Equipment evaluation, education, & procurement, Positioning, Home exercise program, Pain management  Safety Devices  Type of Devices: All fall risk precautions in place, Patient at risk for falls, Call light within reach, Left in chair, Chair alarm in place, Gait belt, Nurse notified     Restrictions  Restrictions/Precautions  Restrictions/Precautions: General Precautions, Fall Risk     Subjective   General  Chart Reviewed: Yes  Patient assessed for rehabilitation services?: Yes  Family / Caregiver Present: No  Follows Commands: Within Functional Limits  Subjective  Subjective: pain: denies; 0/10         Social/Functional History  Social/Functional History  Lives With: Alone  Type of Home: House  Home Layout: Two level, Able to Live on Main level with bedroom/bathroom  Home Access: Ramped entrance  Bathroom Shower/Tub: Tub/Shower unit  Bathroom Toilet: Standard  Bathroom Equipment: Tub transfer bench  Home Equipment: Lorena Echo, rolling, Misha Wisdom  Has the patient had two or more falls in the past year or any fall with injury in the past year?: Yes (3-4)  ADL Assistance: Independent  Homemaking Assistance: Independent  Ambulation Assistance: Independent  Transfer Assistance: Independent ([address] vs RW)  Active : Yes  Occupation: On disability    Vision/Hearing  Vision  Vision: Within Functional Limits  Hearing  Hearing: Within functional limits      Cognition   Orientation  Overall Orientation Status: Within Functional Limits  Cognition  Overall Cognitive Status: Exceptions  Arousal/Alertness: Appropriate responses to stimuli  Following Commands:  Follows all commands without difficulty  Attention Span: Appears intact  Safety Judgement: Decreased awareness of need for safety  Problem Solving: Decreased awareness of errors  Insights: Decreased awareness of deficits  Initiation: Requires cues for some  Sequencing: Requires cues for some     Objective           Gross Assessment  Sensation: Impaired (BLEs)        Strength RLE  Comment: s/p R BKA; knee extension is at least 3/5 observed functionally  Strength LLE  Comment: knee extension: 4/5           Bed mobility  Supine to Sit: Contact guard assistance  Transfers  Stand Pivot Transfers: Minimal Assistance (with verbal and tactile cues for BUE and BLE placement throughout transfer)  Squat Pivot Transfers: Minimal Assistance (with verbal and tactile cues for BUE and BLE placement throughout transfer)  Wheelchair Activities  Propulsion: Yes  Propulsion 1  Propulsion: Manual  Level: Level Tile  Method: RUE;LUE  Level of Assistance: Contact guard assistance  Distance: 50 feet + 25 feet + 25 feet     Balance  Posture: Fair  Sitting - Static: Good  Sitting - Dynamic: Good  Standing - Static: Poor;+  Standing - Dynamic: Poor;+         Therapeutic Activity Training:   Therapeutic activity training was instructed today. Cues were given for safety, sequence, UE/LE placement, awareness, and balance. Activities performed today included bed mobility training, sup-sit, sit-stand, SPT. W/C mobility. AM-PAC Score  AM-PAC Inpatient Mobility Raw Score : 14 (09/04/22 2026)  AM-PAC Inpatient T-Scale Score : 38.1 (09/04/22 2026)  Mobility Inpatient CMS 0-100% Score: 61.29 (09/04/22 2026)  Mobility Inpatient CMS G-Code Modifier : CL (09/04/22 2026)          Goals  Long Term Goals  Time Frame for Long term goals :  In one week:  Long term goal 1: Pt will complete all bed mobility with supervision  Long term goal 2: Pt will complete sit <> stand transfers with SBAx1  Long term goal 3: Pt will complete bed <> chair transfers with SBAx1  Long term goal 4: Pt will propel manual w/c 500 feet with supervision  Long term goal 5: Pt will independently complete 3 sets of 10 reps of BLE AROM exercises in available and allowed ROM       Education  Patient Education  Education Given To: Patient  Education Provided: Role of Therapy; Energy Conservation; Fall Prevention Strategies; Plan of Care;IADL Safety;Transfer Training;Equipment;ADL Adaptive Strategies  Education Method: Demonstration;Verbal  Education Outcome: Verbalized understanding;Demonstrated understanding;Continued education needed      Time In: 1536  Time Out: 0084  Total Treatment Time: 28  Timed Code Treatment Minutes: Byron Tinoco, PT, DPT  License #: [id number]

## 2022-09-05 NOTE — BRIEF OP NOTE
GENERAL SURGERY BRIEF OPERATIVE NOTE  [facility]) Physicians    PATIENT: Jeremi Daley 1980   MRN: [id number]    DATE: 9/5/2022    SURGEON: Pieter Jacobson MD    CASE ID: [id number]     PROCEDURE(S) PERFORMED:     LEG AMPUTATION BELOW KNEE REVISION: 14896 (CPT®)    PREOPERATIVE DIAGNOSIS:  Necrotizing faciitis of the right foot    POSTOPERATIVE DIAGNOSIS:   same    INDICATIONS: Jeremi Daley is a 39 y.o. male presenting with necrotizing fasciitis of the right foot for which he has undergone guillotine amputation below the knee and for which revision to a closed below the knee amputation has been discussed. The risks, benefits, potential complications and possible alternatives of the procedure were discussed in detail, including complications of but not limited to infection, bleeding, anesthesia-related complications, death, injury to surrounding structures, pain, poor healing, need for further amputation, or need for additional interventions. All questions were answered. The patient wished to proceed and consent was documented in the medical record. FINDINGS:   Successful revision of right BKA    ANESTHESIA:   General endotracheal anesthesia  Anesthesiologist: Pedro Luis Clemons MD  CRNA: RAFAEL Meeks - CRNA    STAFF:   [employer]: Gerson Tobias RN    ESTIMATED BLOOD LOSS: Minimal    SPECIMEN(S):   ID Type Source Tests Collected by Time Destination   A : RIGHT BELOW KNEE AMPUTATION Specimen Leg SURGICAL PATHOLOGY Lukasz Ibarra MD 9/5/2022 0849        DRAINS & IMPLANTS:  * No implants in log *     COMPLICATIONS: none    DISPOSITION: ICU - hemodynamically stable.      CONDITION: stable     Electronically signed:   Pieter Jacobson MD 9/5/2022 9:43 AM

## 2022-09-05 NOTE — OP NOTE
beginning the procedure, informed consent was obtained and consent was documented in the medical record. The patient was brought to the operating room and positioned supine on the operating table. Anesthesia was initiated and a time out was performed in which all were in agreement. Appropriate perioperative antibiotics were administered and the patient was prepped and draped in the usual sterile fashion. The right leg was inspected and anterior and posterior skin incisions were outlined with a marking pen, using the level of the previous guillotine amputation as the distal aspect of the posterior flap. The anterior skin was incised about 12 cm below the tibial tuberosity and extended medially and laterally toward the edge of the gastrocnemius muscle. The skin incision was then extended distally on either side parallel to the tibia for 15 cm, creating a posterior flap. The skin and subcutaneous tissues were incised down to the fascia. The greater and short saphenous veins on the medial and posterior aspects of the leg, respectively, were ligated and divided. The fascia and the muscles were then divided with the electrocautery at the same level of the anterior skin incision. The muscles and the anterior and lateral compartment were divided, exposing the anterior tibial vessels, which were ligated and divided. The intraosseous membrane was then incised. The tibial periosteum incised circumferentially with the cautery at the same level of the skin and muscle division. Using a periosteal elevator, the tibial periosteum was stripped proximally 2 cm. The tibia was then transected with an electric saw. The fibula was then exposed, dissected circumferentially, and transected with an electric saw. The amputation was then completed with electrocautery, transecting the soleus muscle obliquely in the gastrocnemius muscle at the same level as the posterior flap.  Soleus veins and posterior tibial and peroneal vessels were clamped and ligated with 2-0 silk sutures. Sharp bony edges were then filed, eliminating any bony prominences over the anterior aspect of the tibia. The fascia of the anterior and posterior muscle flaps were approximated with interrupted 2-0- vicryl absorbable sutures and the subcutaneous tissues were approximated with interrupted 2-0 Vicryl sutures. The skin was approximated with staples. The procedure was concluded. The skin was washed and dried and a sterile dressing applied with Xeroform gauze, 4x4's, ABD pads, kerlix, and ACE wrap, and a knee immobilizer. The patient tolerated the procedure well with no apparent complications and was transferred to PACU - hemodynamically stable. Counts were reported correct at the end of the case. I was present and primarily performed all critical portions of the case.        Electronically signed:   Mary Faustin MD 9/5/2022 9:43 AM

## 2022-09-05 NOTE — ANESTHESIA POSTPROCEDURE EVALUATION
Department of Anesthesiology  Postprocedure Note    Patient: Rick Trent  MRN: [id number]  YOB: 1980  Date of evaluation: 9/5/2022      Procedure Summary     Date: 09/05/22 Room / Location: [address]    Anesthesia Start: 5570 Anesthesia Stop: 8459    Procedure: LEG AMPUTATION BELOW KNEE REVISION (Right) Diagnosis:       Necrotizing fasciitis ([facility]      (Necrotizing faciitis of)    Surgeons: Jose Gutierrez MD Responsible Provider: Obed Duncan MD    Anesthesia Type: general ASA Status: 3          Anesthesia Type: No value filed.     Karla Phase I: Karla Score: 10    Karla Phase II: Karla Score: 10      Anesthesia Post Evaluation    Patient location during evaluation: ICU  Patient participation: complete - patient participated  Level of consciousness: awake and awake and alert  Pain score: 2  Airway patency: patent  Nausea & Vomiting: no nausea and no vomiting  Complications: no  Cardiovascular status: blood pressure returned to baseline and hemodynamically stable  Respiratory status: acceptable and spontaneous ventilation  Hydration status: euvolemic

## 2022-09-06 ENCOUNTER — TELEPHONE (OUTPATIENT)
Dept: FAMILY MEDICINE CLINIC | Age: 42
End: 2022-09-06

## 2022-09-06 ENCOUNTER — CARE COORDINATION (OUTPATIENT)
Dept: CARE COORDINATION | Age: 42
End: 2022-09-06

## 2022-09-06 LAB
ANION GAP SERPL CALCULATED.3IONS-SCNC: 7 MMOL/L (ref 4–16)
BASOPHILS ABSOLUTE: 0 K/CU MM
BASOPHILS RELATIVE PERCENT: 0.2 % (ref 0–1)
BUN BLDV-MCNC: 7 MG/DL (ref 6–23)
CALCIUM SERPL-MCNC: 7.4 MG/DL (ref 8.3–10.6)
CHLORIDE BLD-SCNC: 100 MMOL/L (ref 99–110)
CO2: 30 MMOL/L (ref 21–32)
CREAT SERPL-MCNC: 0.6 MG/DL (ref 0.9–1.3)
CULTURE: NORMAL
CULTURE: NORMAL
DIFFERENTIAL TYPE: ABNORMAL
DOSE AMOUNT: NORMAL
DOSE TIME: NORMAL
EOSINOPHILS ABSOLUTE: 0 K/CU MM
EOSINOPHILS RELATIVE PERCENT: 0.3 % (ref 0–3)
GFR AFRICAN AMERICAN: >60 ML/MIN/1.73M2
GFR NON-AFRICAN AMERICAN: >60 ML/MIN/1.73M2
GLUCOSE BLD-MCNC: 146 MG/DL (ref 70–99)
GLUCOSE BLD-MCNC: 150 MG/DL (ref 70–99)
GLUCOSE BLD-MCNC: 162 MG/DL (ref 70–99)
GLUCOSE BLD-MCNC: 184 MG/DL (ref 70–99)
GLUCOSE BLD-MCNC: 237 MG/DL (ref 70–99)
GLUCOSE BLD-MCNC: 89 MG/DL (ref 70–99)
HCT VFR BLD CALC: 33.2 % (ref 42–52)
HEMOGLOBIN: 10.3 GM/DL (ref 13.5–18)
IMMATURE NEUTROPHIL %: 1.2 % (ref 0–0.43)
LYMPHOCYTES ABSOLUTE: 1.6 K/CU MM
LYMPHOCYTES RELATIVE PERCENT: 12.1 % (ref 24–44)
Lab: NORMAL
Lab: NORMAL
MCH RBC QN AUTO: 28.4 PG (ref 27–31)
MCHC RBC AUTO-ENTMCNC: 31 % (ref 32–36)
MCV RBC AUTO: 91.5 FL (ref 78–100)
MONOCYTES ABSOLUTE: 1 K/CU MM
MONOCYTES RELATIVE PERCENT: 7.4 % (ref 0–4)
NUCLEATED RBC %: 0 %
PDW BLD-RTO: 14.8 % (ref 11.7–14.9)
PLATELET # BLD: 623 K/CU MM (ref 140–440)
PMV BLD AUTO: 8.5 FL (ref 7.5–11.1)
POTASSIUM SERPL-SCNC: 3.7 MMOL/L (ref 3.5–5.1)
RBC # BLD: 3.63 M/CU MM (ref 4.6–6.2)
SEGMENTED NEUTROPHILS ABSOLUTE COUNT: 10.7 K/CU MM
SEGMENTED NEUTROPHILS RELATIVE PERCENT: 78.8 % (ref 36–66)
SODIUM BLD-SCNC: 137 MMOL/L (ref 135–145)
SPECIMEN: NORMAL
SPECIMEN: NORMAL
TOTAL IMMATURE NEUTOROPHIL: 0.16 K/CU MM
TOTAL NUCLEATED RBC: 0 K/CU MM
VANCOMYCIN RANDOM: 18.8 UG/ML
WBC # BLD: 13.6 K/CU MM (ref 4–10.5)

## 2022-09-06 PROCEDURE — 6360000002 HC RX W HCPCS: Performed by: NURSE PRACTITIONER

## 2022-09-06 PROCEDURE — 97542 WHEELCHAIR MNGMENT TRAINING: CPT

## 2022-09-06 PROCEDURE — 2500000003 HC RX 250 WO HCPCS: Performed by: NURSE PRACTITIONER

## 2022-09-06 PROCEDURE — 6370000000 HC RX 637 (ALT 250 FOR IP): Performed by: NURSE PRACTITIONER

## 2022-09-06 PROCEDURE — 97110 THERAPEUTIC EXERCISES: CPT

## 2022-09-06 PROCEDURE — 6360000002 HC RX W HCPCS: Performed by: SURGERY

## 2022-09-06 PROCEDURE — 2580000003 HC RX 258: Performed by: ANESTHESIOLOGY

## 2022-09-06 PROCEDURE — 97530 THERAPEUTIC ACTIVITIES: CPT

## 2022-09-06 PROCEDURE — 1200000000 HC SEMI PRIVATE

## 2022-09-06 PROCEDURE — APPNB15 APP NON BILLABLE TIME 0-15 MINS: Performed by: NURSE PRACTITIONER

## 2022-09-06 PROCEDURE — 99211 OFF/OP EST MAY X REQ PHY/QHP: CPT

## 2022-09-06 PROCEDURE — 82962 GLUCOSE BLOOD TEST: CPT

## 2022-09-06 PROCEDURE — 36415 COLL VENOUS BLD VENIPUNCTURE: CPT

## 2022-09-06 PROCEDURE — 80202 ASSAY OF VANCOMYCIN: CPT

## 2022-09-06 PROCEDURE — 85025 COMPLETE CBC W/AUTO DIFF WBC: CPT

## 2022-09-06 PROCEDURE — 80048 BASIC METABOLIC PNL TOTAL CA: CPT

## 2022-09-06 PROCEDURE — 2580000003 HC RX 258: Performed by: NURSE PRACTITIONER

## 2022-09-06 PROCEDURE — 99231 SBSQ HOSP IP/OBS SF/LOW 25: CPT | Performed by: NURSE PRACTITIONER

## 2022-09-06 RX ADMIN — HYDROMORPHONE HYDROCHLORIDE 0.5 MG: 1 INJECTION, SOLUTION INTRAMUSCULAR; INTRAVENOUS; SUBCUTANEOUS at 07:25

## 2022-09-06 RX ADMIN — SODIUM CHLORIDE, POTASSIUM CHLORIDE, SODIUM LACTATE AND CALCIUM CHLORIDE: 600; 310; 30; 20 INJECTION, SOLUTION INTRAVENOUS at 03:14

## 2022-09-06 RX ADMIN — CEFEPIME HYDROCHLORIDE 2000 MG: 2 INJECTION, POWDER, FOR SOLUTION INTRAMUSCULAR; INTRAVENOUS at 06:20

## 2022-09-06 RX ADMIN — INSULIN GLARGINE 30 UNITS: 100 INJECTION, SOLUTION SUBCUTANEOUS at 21:26

## 2022-09-06 RX ADMIN — OXYCODONE HYDROCHLORIDE 10 MG: 10 TABLET ORAL at 10:46

## 2022-09-06 RX ADMIN — SODIUM CHLORIDE, POTASSIUM CHLORIDE, SODIUM LACTATE AND CALCIUM CHLORIDE: 600; 310; 30; 20 INJECTION, SOLUTION INTRAVENOUS at 10:48

## 2022-09-06 RX ADMIN — PANTOPRAZOLE SODIUM 20 MG: 20 TABLET, DELAYED RELEASE ORAL at 06:17

## 2022-09-06 RX ADMIN — CLINDAMYCIN IN 5 PERCENT DEXTROSE 900 MG: 18 INJECTION, SOLUTION INTRAVENOUS at 11:14

## 2022-09-06 RX ADMIN — VANCOMYCIN HYDROCHLORIDE 1250 MG: 1.25 INJECTION, POWDER, LYOPHILIZED, FOR SOLUTION INTRAVENOUS at 08:54

## 2022-09-06 RX ADMIN — ATORVASTATIN CALCIUM 40 MG: 40 TABLET, FILM COATED ORAL at 21:10

## 2022-09-06 RX ADMIN — VANCOMYCIN HYDROCHLORIDE 1250 MG: 1.25 INJECTION, POWDER, LYOPHILIZED, FOR SOLUTION INTRAVENOUS at 22:59

## 2022-09-06 RX ADMIN — SODIUM CHLORIDE, PRESERVATIVE FREE 10 ML: 5 INJECTION INTRAVENOUS at 08:49

## 2022-09-06 RX ADMIN — SUCRALFATE 1 G: 1 TABLET ORAL at 21:10

## 2022-09-06 RX ADMIN — FLUOXETINE 80 MG: 20 CAPSULE ORAL at 08:48

## 2022-09-06 RX ADMIN — ENOXAPARIN SODIUM 40 MG: 100 INJECTION SUBCUTANEOUS at 08:48

## 2022-09-06 RX ADMIN — INSULIN LISPRO 4 UNITS: 100 INJECTION, SOLUTION INTRAVENOUS; SUBCUTANEOUS at 14:04

## 2022-09-06 RX ADMIN — OXYCODONE HYDROCHLORIDE 10 MG: 10 TABLET ORAL at 06:17

## 2022-09-06 RX ADMIN — SUCRALFATE 1 G: 1 TABLET ORAL at 08:48

## 2022-09-06 RX ADMIN — CLINDAMYCIN IN 5 PERCENT DEXTROSE 900 MG: 18 INJECTION, SOLUTION INTRAVENOUS at 03:08

## 2022-09-06 RX ADMIN — CLINDAMYCIN IN 5 PERCENT DEXTROSE 900 MG: 18 INJECTION, SOLUTION INTRAVENOUS at 21:23

## 2022-09-06 RX ADMIN — HYDROMORPHONE HYDROCHLORIDE 0.5 MG: 1 INJECTION, SOLUTION INTRAMUSCULAR; INTRAVENOUS; SUBCUTANEOUS at 18:21

## 2022-09-06 RX ADMIN — MORPHINE SULFATE 2 MG: 2 INJECTION, SOLUTION INTRAMUSCULAR; INTRAVENOUS at 14:09

## 2022-09-06 RX ADMIN — CEFEPIME HYDROCHLORIDE 2000 MG: 2 INJECTION, POWDER, FOR SOLUTION INTRAMUSCULAR; INTRAVENOUS at 18:21

## 2022-09-06 RX ADMIN — DOCUSATE SODIUM 100 MG: 100 CAPSULE, LIQUID FILLED ORAL at 08:48

## 2022-09-06 ASSESSMENT — ENCOUNTER SYMPTOMS
SHORTNESS OF BREATH: 0
BLOOD IN STOOL: 0
SINUS PRESSURE: 0
VOICE CHANGE: 0
DIARRHEA: 0
VOMITING: 0
ABDOMINAL PAIN: 0
EYE DISCHARGE: 0
COUGH: 0
NAUSEA: 0
CHEST TIGHTNESS: 0
SINUS PAIN: 0
EYE PAIN: 0

## 2022-09-06 ASSESSMENT — PAIN SCALES - GENERAL
PAINLEVEL_OUTOF10: 5
PAINLEVEL_OUTOF10: 10
PAINLEVEL_OUTOF10: 7
PAINLEVEL_OUTOF10: 2
PAINLEVEL_OUTOF10: 7
PAINLEVEL_OUTOF10: 10

## 2022-09-06 ASSESSMENT — PAIN DESCRIPTION - PAIN TYPE: TYPE: SURGICAL PAIN

## 2022-09-06 ASSESSMENT — PAIN DESCRIPTION - ORIENTATION
ORIENTATION: RIGHT

## 2022-09-06 ASSESSMENT — PAIN DESCRIPTION - DESCRIPTORS
DESCRIPTORS: ACHING;CRAMPING
DESCRIPTORS: ACHING
DESCRIPTORS: ACHING

## 2022-09-06 ASSESSMENT — PAIN DESCRIPTION - LOCATION
LOCATION: LEG
LOCATION: KNEE
LOCATION: LEG
LOCATION: LEG

## 2022-09-06 ASSESSMENT — PAIN - FUNCTIONAL ASSESSMENT: PAIN_FUNCTIONAL_ASSESSMENT: PREVENTS OR INTERFERES SOME ACTIVE ACTIVITIES AND ADLS

## 2022-09-06 ASSESSMENT — PAIN DESCRIPTION - FREQUENCY
FREQUENCY: CONTINUOUS
FREQUENCY: CONTINUOUS

## 2022-09-06 ASSESSMENT — PAIN DESCRIPTION - ONSET: ONSET: AWAKENED FROM SLEEP

## 2022-09-06 NOTE — CARE COORDINATION
SW received voicemail message from Pt requesting that this SW contacts his brother, Marcia Wilhelm [phone]. Pt stated in the message that he had his rt foot amputated and can no longer care for himself and is considering assisted living. Attempted phone call to Pt's brother, Marcia Wilhelm. No answer, voicemail message left requesting return phone call, contact number provided.

## 2022-09-06 NOTE — PROGRESS NOTES
Pt to go to room 4113. Report given to [facility] on [address]. Pt was able to stand/pivot to wheel chair for transport and also to new bed with X1 assist and gait belt.

## 2022-09-06 NOTE — PROGRESS NOTES
Physical Therapy  Name: Brant Roberson MRN: [id number] :   1980   Date:  2022   Admission Date: 2022 Room:  [address] [place] [address]   Restrictions/Precautions:  Restrictions/Precautions  Restrictions/Precautions: General Precautions, Fall Risk       Communication with other providers:  Roselia GUZMAN states pt is ok to see for therapy  Subjective:  Patient states:  he can do therapy today  Pain:   Location, Type, Intensity (0/10 to 10/10):  3/10 R LE  Objective:    Observation:  pt was in the bed, moved out of ICU onto 4N  Treatment, including education/measures:  Supine Exercises: explained to pt the importance of laying flat in bed and prevention of hip flexion contractions to prevent limping with gt in the future  Ankle pumps x 15 on the L  Quad sets x 15  Glute sets x 15  Heel slides x 15 on the L  Hip abd x 15  SAQ's x 15 on the L  SLR's x 15  Therapeutic Exercise:  Therapeutic exercises were instructed today. Cues were given for technique, safety, recruitment, and rationale. Cues were verbal and/or tactile. Transfers with line management of IV  Rolling: SBA  Pt wanted to don his underware before going in the hallway  Doffed and donned pt's knee immobilizer with pt assisting for the first 2 straps  Supine to sit :CGA  Sit to supine :CGA  Scooting :SBA  Sit to stand :min A of 1 and VC's for tech  Stand to sit :min A of 1 and VC's for tech  SPT:min A of 1  Pt was instructed in lining up the w/c for trans in/out of w/c. And the importance of locking the wheels. Pt able to propel himself 40 ft x 3 and 55 ft x 1  Safety  Patient left safely in the bed, with call light/phone in reach with alarm applied. Gait belt and mask were used for transfers and gait.   Assessment / Impression:     Patient's tolerance of treatment:  pain was increased during treatment to 4-5/10, pt very motivated and willing to try a RW tomorrow   Adverse Reaction: none  Significant change in status and impact:  none  Barriers to improvement: pain, weakness  Plan for Next Session:    Will cont to work towards pt's goals per his tolerance  Time in:  1530  Time out:  1623  Timed treatment minutes:  53  Total treatment time:  48  Previously filed items:  Social/Functional History  Lives With: Alone  Type of Home: House  Home Layout: Two level, Able to Live on Main level with bedroom/bathroom  Home Access: Ramped entrance  Bathroom Shower/Tub: Tub/Shower unit  Bathroom Toilet: Standard  Bathroom Equipment: Tub transfer [address]: melissa Alarcon Eleuterio Patirick  Has the patient had two or more falls in the past year or any fall with injury in the past year?: Yes (3-4)  ADL Assistance: Independent  Homemaking Assistance: Independent  Ambulation Assistance: Independent  Transfer Assistance: Independent ([address] vs RW)  Active : Yes  Occupation: On disability     Long Term Goals  Time Frame for Long term goals : In one week:  Long term goal 1: Pt will complete all bed mobility with supervision  Long term goal 2: Pt will complete sit <> stand transfers with SBAx1  Long term goal 3: Pt will complete bed <> chair transfers with SBAx1  Long term goal 4: Pt will propel manual w/c 500 feet with supervision  Long term goal 5: Pt will independently complete 3 sets of 10 reps of BLE AROM exercises in available and allowed ROM  Electronically signed by:     Staci Velarde PTA  9/6/2022, 4:22 PM

## 2022-09-06 NOTE — PROGRESS NOTES
[address] [facility]  consulted by KAMI Cabrales for monitoring and adjustment. Indication for treatment: Necrotizing fasciitis POD1 guillotine BKA  Goal trough: [] 10-15 mcg/mL or [x] 15-20 mcg/ml  AUC/LIDIA: [] <500 or [x] 400-600    Pertinent Laboratory Values:   Temp Readings from Last 3 Encounters:   09/06/22 98 °F (36.7 °C)   09/02/22 98.6 °F (37 °C) (Oral)   08/30/22 99 °F (37.2 °C) (Temporal)     Recent Labs     09/04/22  0408 09/05/22  0537 09/06/22  0833   WBC 12.2* 9.5 13.6*     Recent Labs     09/04/22  0408 09/05/22  0537 09/06/22  0833   BUN 11 9 7   CREATININE 0.8* 0.7* 0.6*     Estimated Creatinine Clearance: 178 mL/min (A) (based on SCr of 0.6 mg/dL (L)). Intake/Output Summary (Last 24 hours) at 9/6/2022 1355  Last data filed at 9/6/2022 1109  Gross per 24 hour   Intake 240 ml   Output 1900 ml   Net -1660 ml       Pertinent Cultures:  Date    Source    Results  9/02   Blood x 2   NGTD      Vancomycin level:   RANDOM:    Recent Labs     09/06/22  0833   VANCORANDOM 18.8     Assessment:  Renal function stable, UOP good. Pt afebrile with stable leukocytosis  S/p right leg guillotine BKA plans to return to OR for revision to closed BKA. Per surgery continue abx for now. Likely de-escalate once source control complete and pt no longer needs surgical intervention. Day(s) of therapy:5 of 7  Vancomycin level: 10.3 (12 hours post dose)   with ssTrough 11  18.8 ()    Plan:  Received vancomycin 2000 mg x 1 in the ED- on  9/2. CONTINUE SAME DOSE AND FREQUENCY =   Vanco 1250mg IV q12 hours  Pharmacy will continue to monitor patient and adjust therapy as indicated    Abram 3 9/09 @0600    Thank you for the consult.   Zafar Dumont, [facility]

## 2022-09-06 NOTE — PROGRESS NOTES
GENERAL SURGERY INPATIENT POST-OP NOTE  [facility]) Physicians    PATIENT: Andrés Montoya, 39 y.o., male, MRN: [id number]    Hospital Day:  LOS: 3 days , Post-Op Day: 4 days post op right leg guillotine BKA and POD #1 for revision from guillotine to closed BKA. Procedure(s): 22 RIGHT LEG AMPUTATION BELOW KNEE    Andrés Montoya is a 39 y.o. male who presented with history of diabetic foot ulcers, recent hospital discharge for pneumatosis coli, now presenting with acute worsening of his right foot wound concerning for necrotizing fasciitis             Subjective:  Chief Complaint: better today, some pain with brace  Pain: 0/10  BM:  yes  Diet: ADULT DIET; Regular; 4 carb choices (60 gm/meal)  Activity: with assistance    Stable overnight. Some pain with brace. Worked with PT yesterday. No N/V. Concerned about going home with brace.     Objective:    Vitals: BP (!) 147/83   Pulse 71   Temp 97.9 °F (36.6 °C) (Oral)   Resp (!) 7   Ht 6' (1.829 m)   Wt 181 lb 7 oz (82.3 kg)   SpO2 99%   BMI 24.61 kg/m²   Vital Signs (Last 24 Hours)  Temp  Av.9 °F (36.6 °C)  Min: 97.6 °F (36.4 °C)  Max: 98.1 °F (36.7 °C)  Pulse  Av.9  Min: 68  Max: 88  BP  Min: 125/87  Max: 171/95  Resp  Av.7  Min: 7  Max: 30  SpO2  Av.5 %  Min: 76 %  Max: 100 %  Wt Readings from Last 3 Encounters:   22 181 lb 7 oz (82.3 kg)   22 184 lb 11.9 oz (83.8 kg)   22 180 lb (81.6 kg)       I/O:  1901 -  0700  In: 950 [I.V.:700]  Out: 2860 [Urine:2850]    IV Fluids:   lactated ringers Last Rate: 125 mL/hr at 22 0314    sodium chloride    dextrose    sodium chloride Last Rate: 20 mL/hr at 22 0914    dextrose    Scheduled Meds:   sodium chloride flush, 5-40 mL, IntraVENous, 2 times per day    meperidine, 12.5 mg, IntraVENous, Once    enoxaparin, 40 mg, SubCUTAneous, Daily    vancomycin, 1,250 mg, IntraVENous, Q12H    sodium chloride flush, 5-40 mL, IntraVENous, 2 times per day    cefepime, 2,000 mg, IntraVENous, Q12H    clindamycin (CLEOCIN) IV, 900 mg, IntraVENous, Q8H    docusate sodium, 100 mg, Oral, Daily    atorvastatin, 40 mg, Oral, Nightly    FLUoxetine, 80 mg, Oral, Daily    [Held by provider] losartan, 25 mg, Oral, Daily    [Held by provider] metoprolol succinate, 25 mg, Oral, Daily    pantoprazole, 20 mg, Oral, QAM AC    sucralfate, 1 g, Oral, 2 times per day    insulin glargine, 30 Units, SubCUTAneous, Nightly    insulin lispro, 0-16 Units, SubCUTAneous, TID WC    insulin lispro, 0-4 Units, SubCUTAneous, Nightly    Physical Exam:  General Appearance:   Alert, cooperative, no distress    Head:   Normocephalic, atraumatic    Lungs:    Equal chest rise, respirations unlabored   Heart:   Regular rate and rhythm    Abdomen:    Soft, non-tender, no rebound or guarding    Extremities:  No cyanosis, improving edema in the right BKA stump with dressing intact; no blood noted on dressing. Knee immobilizer on  Left foot with chronic ulcers of the medial foot and plantar foot, stable   Neurologic:  Nonfocal, grossly intact, peripheral neuropathy        Labs/Imaging Results:   Recent Results (from the past 24 hour(s))   POCT Glucose    Collection Time: 09/05/22 12:28 PM   Result Value Ref Range    POC Glucose 240 (H) 70 - 99 MG/DL   POCT Glucose    Collection Time: 09/05/22  5:41 PM   Result Value Ref Range    POC Glucose 179 (H) 70 - 99 MG/DL   POCT Glucose    Collection Time: 09/05/22  8:31 PM   Result Value Ref Range    POC Glucose 192 (H) 70 - 99 MG/DL   POCT Glucose    Collection Time: 09/06/22  3:19 AM   Result Value Ref Range    POC Glucose 150 (H) 70 - 99 MG/DL   Vancomycin Level, Random    Collection Time: 09/06/22  8:33 AM   Result Value Ref Range    Vancomycin Rm 18.8 UG/ML    DOSE AMOUNT DOSE AMT.  GIVEN - 1250mg     DOSE TIME DOSE TIME GIVEN - 0800/2000    CBC with Auto Differential    Collection Time: 09/06/22  8:33 AM   Result Value Ref Range    WBC 13.6 (H) 4.0 - 10.5 K/CU MM    RBC 3.63 (L) 4.6 - 6.2 M/CU MM    Hemoglobin 10.3 (L) 13.5 - 18.0 GM/DL    Hematocrit 33.2 (L) 42 - 52 %    MCV 91.5 78 - 100 FL    MCH 28.4 27 - 31 PG    MCHC 31.0 (L) 32.0 - 36.0 %    RDW 14.8 11.7 - 14.9 %    Platelets 033 (H) 080 - 440 K/CU MM    MPV 8.5 7.5 - 11.1 FL    Differential Type AUTOMATED DIFFERENTIAL     Segs Relative 78.8 (H) 36 - 66 %    Lymphocytes % 12.1 (L) 24 - 44 %    Monocytes % 7.4 (H) 0 - 4 %    Eosinophils % 0.3 0 - 3 %    Basophils % 0.2 0 - 1 %    Segs Absolute 10.7 K/CU MM    Lymphocytes Absolute 1.6 K/CU MM    Monocytes Absolute 1.0 K/CU MM    Eosinophils Absolute 0.0 K/CU MM    Basophils Absolute 0.0 K/CU MM    Nucleated RBC % 0.0 %    Total Nucleated RBC 0.0 K/CU MM    Total Immature Neutrophil 0.16 K/CU MM    Immature Neutrophil % 1.2 (H) 0 - 0.43 %   Basic Metabolic Panel w/ Reflex to MG    Collection Time: 09/06/22  8:33 AM   Result Value Ref Range    Sodium 137 135 - 145 MMOL/L    Potassium 3.7 3.5 - 5.1 MMOL/L    Chloride 100 99 - 110 mMol/L    CO2 30 21 - 32 MMOL/L    Anion Gap 7 4 - 16    BUN 7 6 - 23 MG/DL    Creatinine 0.6 (L) 0.9 - 1.3 MG/DL    Glucose 184 (H) 70 - 99 MG/DL    Calcium 7.4 (L) 8.3 - 10.6 MG/DL    GFR Non-African American >60 >60 mL/min/1.73m2    GFR African American >60 >60 mL/min/1.73m2   POCT Glucose    Collection Time: 09/06/22  8:44 AM   Result Value Ref Range    POC Glucose 162 (H) 70 - 99 MG/DL         Assessment:  Jeff Bowman is a 39 y.o. male who is post-op day #1 Day Post-Op 9/2/22 RIGHT LEG AMPUTATION BELOW KNEE      Principal Problem:    Necrotizing fasciitis ([address]  Active Problems:    Diabetic ulcer of toe of left foot associated with type 2 diabetes mellitus, with fat layer exposed ([address]    Sepsis ([address]    Pneumatosis coli    Type 2 diabetes mellitus with right diabetic foot infection (HCC)    Necrotizing fasciitis of lower leg (HCC)    Type 2 diabetes mellitus, with long-term current use of insulin (HCC)  Resolved Problems:    * No resolved hospital problems. *    Plan:  Discussed findings and options with Corrinne Hau. Amputation site with dressing intact and knee immobilizer on.    Will plan dressing change tomorrow  Continue empiric abx for now  PT/OT   Consult case management to help with rehab    Electronically signed: RAFAEL Garcia - CNP 9/6/2022 9:31 AM

## 2022-09-06 NOTE — CARE COORDINATION
Received pt in transfer up to 4N from 2nd floor- PT/OT rec'd Inpt Rehab and consult in place for plcmt. Sent message to Lorie to ARU to review if ARU is a possibility. CM to follow up tomorrow to discuss discharge plan with pt, he is currently working with therapy.

## 2022-09-06 NOTE — CONSULTS
[facility] Continence Nurse  Consult Note       Mary Green  AGE: 39 y.o.    GENDER: male  : 1980  TODAY'S DATE:  2022    Subjective:     Reason for Evaluation and Assessment: wound care evalKira Green is a 39 y.o. male referred by:   [x] Physician  [] Nursing  [] Other:     Wound Identification:  Wound Type: diabetic and pressure  Contributing Factors: diabetes and chronic pressure        PAST MEDICAL HISTORY        Diagnosis Date    Abscess of left foot 2022    Anemia associated with acute blood loss 2022    Callus of foot     Right foot - see's Dr. Zafar Sandra    Cellulitis of left foot 2022    Diabetic ulcer of left midfoot associated with type 2 diabetes mellitus, with muscle involvement without evidence of necrosis ([address] 2022    Diabetic ulcer of left midfoot associated with type 2 diabetes mellitus, with necrosis of muscle ([address] 2021    Diabetic ulcer of right midfoot associated with type 2 diabetes mellitus, with fat layer exposed ([address] 2020    Dizziness     positional    Essential hypertension     Follows with PCP    Hyperlipidemia     Lumbar radiculopathy     Septic embolism ([address] 2020    Subacute osteomyelitis of left foot ([address] 2022       PAST SURGICAL HISTORY    Past Surgical History:   Procedure Laterality Date    ACHILLES TENDON SURGERY Right 2021    RIGHT ACHILLES TENDON LENGTHENING REPAIR performed by Cassius Denny DPM at [address]  2018    [facility]    DENTAL SURGERY      teeth extractions -half per patient    HERNIA REPAIR Bilateral 2020    BIALTERAL HERNIA INGUINAL REPAIR performed by Arturo Ross MD at [address] [place] [address] Right 2022    LEG AMPUTATION BELOW KNEE performed by Dipti Dueñas MD at [facility] 2018    SD OFFICE/OUTPT VISIT,PROCEDURE ONLY Left 2018    L5-S1 HEMILAMINECTOMY, REMOVAL OF DISC LEFT SIDE performed by 04:08 AM    ALKPHOS 73 09/04/2022 04:08 AM    AST 24 09/04/2022 04:08 AM    ALT 15 09/04/2022 04:08 AM     Albumin:    Lab Results   Component Value Date/Time    LABALBU 2.5 09/04/2022 04:08 AM     PT/INR:    Lab Results   Component Value Date/Time    PROTIME 11.5 05/20/2022 11:04 PM    INR 0.89 05/20/2022 11:04 PM     HgBA1c:    Lab Results   Component Value Date/Time    LABA1C 10.5 07/28/2022 10:45 AM         Assessment:     Patient Active Problem List   Diagnosis    Unstable angina (HCC)    Lumbar back pain with radiculopathy affecting left lower extremity    S/P lumbar laminectomy    Type 2 diabetes mellitus, with long-term current use of insulin (HCC)    Essential hypertension    Gastroesophageal reflux disease without esophagitis    Chest pain    Moderate nonproliferative diabetic retinopathy of both eyes without macular edema associated with type 2 diabetes mellitus (HCC)    Acute osteomyelitis (HCC)    Acute osteomyelitis of foot (HCC)    Abscess of left foot    Cellulitis of left foot    Subacute osteomyelitis of left foot (HCC)    Anemia associated with acute blood loss    Anxiety attack    WD-Diabetic ulcer of left midfoot associated with type 2 diabetes mellitus, with fat layer exposed ([address]    Diabetic ulcer of toe of left foot associated with type 2 diabetes mellitus, with fat layer exposed ([address]    WD-Partial nontraumatic amputation of foot, left (HCC)    Severe episode of recurrent major depressive disorder, without psychotic features ([address]    Insomnia    Intractable vomiting with nausea    Intractable nausea and vomiting    Necrotizing fasciitis ([address]    Sepsis ([address]    Pneumatosis coli    Type 2 diabetes mellitus with right diabetic foot infection ([address]    Necrotizing fasciitis of lower leg (HCC)       Measurements:  Negative Pressure Wound Therapy (Active)   Number of days: 104       Incision 04/17/18 Back (Active)   Number of days: 1603       Wound 05/11/22 #1 (onset 2 weeks) Left Medial Foot Amp Site (Active)   Wound Image   09/06/22 1023   Wound Etiology Non-Healing Surgical 09/06/22 1023   Dressing Status New dressing applied 09/06/22 1023   Wound Cleansed Cleansed with saline 09/06/22 1023   Dressing/Treatment Collagen;Hydrofiber Ag;ABD;Roll gauze 09/06/22 1023   Offloading for Diabetic Foot Ulcers Diabetic shoes/inserts 09/06/22 0918   Dressing Change Due 09/07/22 09/06/22 0918   Wound Length (cm) 3.5 cm 09/06/22 1023   Wound Width (cm) 0.9 cm 09/06/22 1023   Wound Depth (cm) 0.7 cm 09/06/22 1023   Wound Surface Area (cm^2) 3.15 cm^2 09/06/22 1023   Change in Wound Size % (l*w) 93.88 09/06/22 1023   Wound Volume (cm^3) 2.205 cm^3 09/06/22 1023   Wound Healing % 91 09/06/22 1023   Post-Procedure Length (cm) 4 cm 08/30/22 1343   Post-Procedure Width (cm) 1 cm 08/30/22 1343   Post-Procedure Depth (cm) 0.6 cm 08/30/22 1343   Post-Procedure Surface Area (cm^2) 4 cm^2 08/30/22 1343   Post-Procedure Volume (cm^3) 2.4 cm^3 08/30/22 1343   Distance Tunneling (cm) 0 cm 09/06/22 1023   Tunneling Position ___ O'Clock 0 09/06/22 1023   Undermining Starts ___ O'Clock 6 09/06/22 1023   Undermining Ends___ O'Clock 3 09/06/22 1023   Undermining Maxium Distance (cm) 0.7 09/06/22 1023   Wound Assessment Pink/red 09/06/22 1023   Drainage Amount Moderate 09/06/22 1023   Drainage Description Serosanguinous 09/06/22 1023   Odor None 09/06/22 1023   Rosalie-wound Assessment Maceration; Hyperkeratosis (callous) 09/06/22 1023   Margins Defined edges 09/06/22 1023   Wound Thickness Description not for Pressure Injury Full thickness 09/06/22 1023   Number of days: 118       Wound 05/11/22 #2 (onset unknown) Left Plantar (Active)   Wound Image   09/06/22 1023   Wound Etiology Diabetic 09/06/22 1023   Dressing Status New dressing applied 09/06/22 1023   Wound Cleansed Cleansed with saline 09/06/22 1023   Dressing/Treatment Collagen;Hydrofiber Ag;ABD;Roll gauze 09/06/22 1023   Offloading for Diabetic Foot Ulcers Diabetic shoes/inserts 09/06/22 0918   Dressing Change Due 09/04/22 09/06/22 0918   Wound Length (cm) 1.9 cm 09/06/22 1023   Wound Width (cm) 1.3 cm 09/06/22 1023   Wound Depth (cm) 0.4 cm 09/06/22 1023   Wound Surface Area (cm^2) 2.47 cm^2 09/06/22 1023   Change in Wound Size % (l*w) -54.38 09/06/22 1023   Wound Volume (cm^3) 0.988 cm^3 09/06/22 1023   Wound Healing % -209 09/06/22 1023   Post-Procedure Length (cm) 1 cm 08/30/22 1343   Post-Procedure Width (cm) 1.5 cm 08/30/22 1343   Post-Procedure Depth (cm) 1 cm 08/30/22 1343   Post-Procedure Surface Area (cm^2) 1.5 cm^2 08/30/22 1343   Post-Procedure Volume (cm^3) 1.5 cm^3 08/30/22 1343   Distance Tunneling (cm) 0 cm 09/06/22 1023   Tunneling Position ___ O'Clock 0 09/06/22 1023   Undermining Starts ___ O'Clock 0 09/06/22 1023   Undermining Ends___ O'Clock 0 09/06/22 1023   Undermining Maxium Distance (cm) 0 09/06/22 1023   Wound Assessment Pink/red 09/06/22 1023   Drainage Amount Moderate 09/06/22 1023   Drainage Description Serosanguinous 09/06/22 1023   Odor None 09/06/22 1023   Rosalie-wound Assessment Maceration 09/06/22 1023   Margins Defined edges 09/06/22 1023   Wound Thickness Description not for Pressure Injury Full thickness 09/06/22 1023   Number of days: 118       Response to treatment:  Well tolerated by patient. Pain Assessment:  Severity:  none  Quality of pain:   Wound Pain Timing/Severity:   Premedicated: no    Plan:     Plan of Care: Wound 05/11/22 #1 (onset 2 weeks) Left Medial Foot Amp Site-Dressing/Treatment: Collagen, Hydrofiber Ag, ABD, Roll gauze  Wound 05/11/22 #2 (onset unknown) Left Plantar-Dressing/Treatment: Collagen, Hydrofiber Ag, ABD, Roll gauze  [REMOVED] Wound 05/11/22 #3 (onset unknown) Right Plantar-Dressing/Treatment: Hydrofiber Ag, ABD, Roll gauze    Patient in bed agreeable to wound care eval for chronic left foot amp site and plantar wound diabetic/pressure. Pt has been seen by wound care from multiple previous admissions.  Pt had a chronic wound to the rt plantar now s/p rt BKA. Left foot wounds cleansed with NS. Measured and pictured. Rosalie wounds macerated/calloused. Applied collagen/Aquacel ag dressings as documented per flow sheet. Pt is at mild risk for skin breakdown AEB tanya. Follow tanya orders. Specialty Bed Required : yes  [] Low Air Loss   [x] Pressure Redistribution  [] Fluid Immersion  [] Bariatric  [] Total Pressure Relief  [] Other:     Discharge Plan:  Placement for patient upon discharge: tbd  Hospice Care: no  Patient appropriate for Outpatient [address] [place] [address] Street: yes     Patient/Caregiver Teaching:  Level of patient/caregiver understanding able to:   Pt voiced understanding. Electronically signed by Doug Parra RN,  on 9/6/2022 at 1:30 PM

## 2022-09-06 NOTE — PROGRESS NOTES
V2.0  [facility] [place]      Name:  Isabelle George /Age/Sex: 1980  (39 y.o. male)   MRN & CSN:  [id number] & [id number] Encounter Date/Time: 2022 3:49 PM EDT    Location:  [id number]/0850-X PCP: Maine Rodriguez, [address] Day: 5    Assessment and Plan:   Isabelle George is a 39 y.o. male with hypertension, type 2 diabetes with long-term insulin use, mood disorder who presents with Necrotizing fasciitis ([facility] [address]      Plan:  Sepsis secondary to necrotizing fasciitis s/p right BKA with wound VAC placement: S/p OR for revision . Currently on Vanco cefepime and clindamycin by general surgery. Appropriate bowel regimen. Wound care consulted. Optimize pain regimen in place. Empiric antibiotics to be continue for now. Chronic diabetic foot ulcer of the left toe associated with diabetes mellitus: Wound care consult in place. Insulin-dependent diabetes mellitus type 2 on sliding scale insulin with a goal sugar of 140-180  Mood disorder continue fluoxetine  Benign essential hypertension resume losartan and metoprolol    Diet ADULT DIET; Regular; 4 carb choices (60 gm/meal)   DVT Prophylaxis [x] Lovenox, []  Heparin, [] SCDs, [] Ambulation,  [] Eliquis, [] Xarelto  [] Coumadin   Code Status Full Code   Disposition From: Home  Expected Disposition: TBD  Estimated Date of Discharge: TBD     Surrogate Decision Maker/ KOREY Loya     Subjective:     Chief Complaint: Wound Check (Bilateral foot ulcers) and Emesis     Patient seen and examined this morning. He denies any complaints. He states he is not having any pain. He is stable for transfer to the floor. Review of Systems:    Review of Systems   Constitutional:  Negative for chills and fever. HENT:  Negative for ear pain, hearing loss, sinus pressure, sinus pain and voice change. Eyes:  Negative for pain, discharge and visual disturbance. Respiratory:  Negative for cough, chest tightness and shortness of breath. Cardiovascular:  Negative for chest pain and palpitations. Gastrointestinal:  Negative for abdominal pain, blood in stool, diarrhea, nausea and vomiting. Genitourinary:  Negative for dysuria and frequency. Neurological:  Negative for dizziness, light-headedness and headaches. Psychiatric/Behavioral:  Negative for sleep disturbance. Objective: Intake/Output Summary (Last 24 hours) at 9/6/2022 1420  Last data filed at 9/6/2022 1109  Gross per 24 hour   Intake 240 ml   Output 1900 ml   Net -1660 ml          Vitals:   Vitals:    09/06/22 1342   BP: 122/80   Pulse: 75   Resp: 14   Temp: 98 °F (36.7 °C)   SpO2: 98%       Physical Exam:   Physical Exam  Vitals reviewed. Constitutional:       Appearance: Normal appearance. He is normal weight. HENT:      Head: Normocephalic. Nose: Nose normal.      Mouth/Throat:      Mouth: Mucous membranes are moist.   Eyes:      Conjunctiva/sclera: Conjunctivae normal.      Pupils: Pupils are equal, round, and reactive to light. Cardiovascular:      Rate and Rhythm: Normal rate and regular rhythm. Pulses: Normal pulses. Heart sounds: Normal heart sounds. No murmur heard. Pulmonary:      Effort: Pulmonary effort is normal.      Breath sounds: No wheezing or rhonchi. Abdominal:      General: Abdomen is flat. Bowel sounds are normal. There is no distension. Palpations: Abdomen is soft. Tenderness: There is no abdominal tenderness. Musculoskeletal:         General: Deformity present. Normal range of motion. Cervical back: Normal range of motion and neck supple. Comments: BKA   Skin:     General: Skin is dry. Coloration: Skin is pale. Skin is not jaundiced. Neurological:      General: No focal deficit present. Mental Status: He is alert and oriented to person, place, and time. Mental status is at baseline. Motor: Weakness present.    Psychiatric:         Mood and Affect: Mood normal.         Behavior: Behavior normal.          Medications:   Medications:    sodium chloride flush  5-40 mL IntraVENous 2 times per day    meperidine  12.5 mg IntraVENous Once    enoxaparin  40 mg SubCUTAneous Daily    vancomycin  1,250 mg IntraVENous Q12H    sodium chloride flush  5-40 mL IntraVENous 2 times per day    cefepime  2,000 mg IntraVENous Q12H    clindamycin (CLEOCIN) IV  900 mg IntraVENous Q8H    docusate sodium  100 mg Oral Daily    atorvastatin  40 mg Oral Nightly    FLUoxetine  80 mg Oral Daily    [Held by provider] losartan  25 mg Oral Daily    [Held by provider] metoprolol succinate  25 mg Oral Daily    pantoprazole  20 mg Oral QAM AC    sucralfate  1 g Oral 2 times per day    insulin glargine  30 Units SubCUTAneous Nightly    insulin lispro  0-16 Units SubCUTAneous TID WC    insulin lispro  0-4 Units SubCUTAneous Nightly      Infusions:    lactated ringers 125 mL/hr at 09/06/22 1048    sodium chloride      dextrose      sodium chloride 20 mL/hr at 09/03/22 0914    dextrose       PRN Meds: sodium chloride flush, 5-40 mL, PRN  sodium chloride, 25 mL, PRN  HYDROmorphone, 0.25 mg, Q5 Min PRN  fentanNYL, 50 mcg, Q5 Min PRN  hydrALAZINE, 10 mg, Q15 Min PRN  glucose, 4 tablet, PRN  dextrose bolus, 125 mL, PRN   Or  dextrose bolus, 250 mL, PRN  glucagon (rDNA), 1 mg, PRN  dextrose, , Continuous PRN  HYDROmorphone, 0.5 mg, Q3H PRN  sodium chloride flush, 5-40 mL, PRN  sodium chloride, , PRN  ondansetron, 4 mg, Q8H PRN   Or  ondansetron, 4 mg, Q6H PRN  oxyCODONE, 5 mg, Q4H PRN   Or  oxyCODONE, 10 mg, Q4H PRN  morphine, 1 mg, Q2H PRN   Or  morphine, 2 mg, Q2H PRN  glucose, 4 tablet, PRN  dextrose bolus, 125 mL, PRN   Or  dextrose bolus, 250 mL, PRN  glucagon (rDNA), 1 mg, PRN  dextrose, , Continuous PRN  aluminum & magnesium hydroxide-simethicone, 30 mL, Q6H PRN      Labs      Recent Results (from the past 24 hour(s))   POCT Glucose    Collection Time: 09/05/22  5:41 PM   Result Value Ref Range    POC Glucose 179 (H) 70 - 99 MG/DL POCT Glucose    Collection Time: 09/05/22  8:31 PM   Result Value Ref Range    POC Glucose 192 (H) 70 - 99 MG/DL   POCT Glucose    Collection Time: 09/06/22  3:19 AM   Result Value Ref Range    POC Glucose 150 (H) 70 - 99 MG/DL   Vancomycin Level, Random    Collection Time: 09/06/22  8:33 AM   Result Value Ref Range    Vancomycin Rm 18.8 UG/ML    DOSE AMOUNT DOSE AMT.  GIVEN - 1250mg     DOSE TIME DOSE TIME GIVEN - 0800/2000    CBC with Auto Differential    Collection Time: 09/06/22  8:33 AM   Result Value Ref Range    WBC 13.6 (H) 4.0 - 10.5 K/CU MM    RBC 3.63 (L) 4.6 - 6.2 M/CU MM    Hemoglobin 10.3 (L) 13.5 - 18.0 GM/DL    Hematocrit 33.2 (L) 42 - 52 %    MCV 91.5 78 - 100 FL    MCH 28.4 27 - 31 PG    MCHC 31.0 (L) 32.0 - 36.0 %    RDW 14.8 11.7 - 14.9 %    Platelets 567 (H) 175 - 440 K/CU MM    MPV 8.5 7.5 - 11.1 FL    Differential Type AUTOMATED DIFFERENTIAL     Segs Relative 78.8 (H) 36 - 66 %    Lymphocytes % 12.1 (L) 24 - 44 %    Monocytes % 7.4 (H) 0 - 4 %    Eosinophils % 0.3 0 - 3 %    Basophils % 0.2 0 - 1 %    Segs Absolute 10.7 K/CU MM    Lymphocytes Absolute 1.6 K/CU MM    Monocytes Absolute 1.0 K/CU MM    Eosinophils Absolute 0.0 K/CU MM    Basophils Absolute 0.0 K/CU MM    Nucleated RBC % 0.0 %    Total Nucleated RBC 0.0 K/CU MM    Total Immature Neutrophil 0.16 K/CU MM    Immature Neutrophil % 1.2 (H) 0 - 0.43 %   Basic Metabolic Panel w/ Reflex to MG    Collection Time: 09/06/22  8:33 AM   Result Value Ref Range    Sodium 137 135 - 145 MMOL/L    Potassium 3.7 3.5 - 5.1 MMOL/L    Chloride 100 99 - 110 mMol/L    CO2 30 21 - 32 MMOL/L    Anion Gap 7 4 - 16    BUN 7 6 - 23 MG/DL    Creatinine 0.6 (L) 0.9 - 1.3 MG/DL    Glucose 184 (H) 70 - 99 MG/DL    Calcium 7.4 (L) 8.3 - 10.6 MG/DL    GFR Non-African American >60 >60 mL/min/1.73m2    GFR African American >60 >60 mL/min/1.73m2   POCT Glucose    Collection Time: 09/06/22  8:44 AM   Result Value Ref Range    POC Glucose 162 (H) 70 - 99 MG/DL   POCT Glucose    Collection Time: 09/06/22 11:29 AM   Result Value Ref Range    POC Glucose 237 (H) 70 - 99 MG/DL        Imaging/Diagnostics Last 24 Hours   XR FOOT RIGHT (MIN 3 VIEWS)    Result Date: 9/2/2022  EXAMINATION: THREE XRAY VIEWS OF THE RIGHT FOOT 9/2/2022 7:18 pm COMPARISON: April 18, 2022 HISTORY: ORDERING SYSTEM PROVIDED HISTORY: redness, swelling TECHNOLOGIST PROVIDED HISTORY: Reason for exam:->redness, swelling Reason for Exam: redness, swelling Additional signs and symptoms: none Relevant Medical/Surgical History: diabetes FINDINGS: Soft tissue ulceration noted involving the medial aspect of the foot. There has been prior transmetatarsal amputation. Focal osteolysis involving the 1st metatarsal remnant, with associated comminuted, mildly displaced subacute fracture. There is intra-articular extension to the tarsometatarsal joint. Immature callus formation is noted. The amputation margins of the 2nd, 3rd, 4th and 5th metatarsals appears stable. The joint alignment is maintained. Soft tissue gas extends along the foot, with foci of soft tissue gas also noted involving the anterior soft tissues at the level of the distal tibia. Findings are consistent with pyogenic infection involving the soft tissues forefoot/midfoot, with small foci of gas tracking along the dorsal aspect of the foot to the anterior soft tissues in the region the lower tibia. The extent of soft tissue infection could be further evaluated by CT or MRI if clinically indicated. There has been transmetatarsal amputation of the forefoot. Acute osteomyelitis is noted involving the 2nd metatarsal remnant. Additionally, there is a subacute appearing, comminuted and displaced intra-articular pathologic fracture involving the 1st metatarsal remnant.      CTA LOWER EXTREMITY RIGHT W CONTRAST    Result Date: 9/2/2022  EXAMINATION: CTA OF THE RIGHT LOWER EXTREMITY 9/2/2022 9:03 pm TECHNIQUE: CTA of the right lower extremity was performed after the administration of intravenous contrast. Multiplanar reformatted images are provided for review. MIP images are provided for review. . Automated exposure control, iterative reconstruction, and/or weight based adjustment of the mA/kV was utilized to reduce the radiation dose to as low as reasonably achievable. COMPARISON: None. HISTORY ORDERING SYSTEM PROVIDED HISTORY: cellulitis, gas in foot, ?osteo TECHNOLOGIST PROVIDED HISTORY: Reason for exam:->cellulitis, gas in foot, ?osteo Reason for Exam: cellulitis, gas in foot, ?osteo FINDINGS: Bones: Prior transmetatarsal amputation of the 1st through 5th digits. There is osseous erosion and destruction with cortical irregularity in the remaining great toe metatarsal.  Degenerative changes are noted in the midfoot. Soft Tissue: There is diffuse subcutaneous edema and fluid in the calf soft tissues. There is associated skin thickening. There is an irregular rim enhancing gas and fluid containing collection in the anterior ankle soft tissues extending into the foot, which measures approximately 2.4 x 1.7 cm. There is additional soft tissue gas adjacent to the great toe. There is ulceration in the great toe soft tissues. There are mildly enlarged popliteal lymph nodes. There are enlarged bilateral inguinal lymph nodes with the right measuring up to 3 x 2.4 cm. Visualized bowel loops are nondilated. Bladder is partially distended. Vessels: The SFA is patent with scattered atherosclerosis. The popliteal artery is patent with scattered atherosclerosis. Evaluation of the calf vasculature is mildly limited due to contrast timing. The PT trunk is patent. The anterior tibial posterior tibial, and peroneal arteries appear patent to the foot. 1. Prior 1st through 5th transmetatarsal amputation. There is osseous erosion and destruction with cortical destruction in the remaining great toe metatarsal, concerning for osteomyelitis.  2. Soft tissue edema and emphysema with rim enhancing fluid collection in the anterior ankle and extending along the great toe metatarsal, consistent with abscess. 3. Atherosclerosis throughout the right lower extremity vasculature, which appears patent.        Electronically signed by RAFAEL Zepeda CNP on 9/6/2022 at 2:20 PM

## 2022-09-07 ENCOUNTER — CARE COORDINATION (OUTPATIENT)
Dept: CARE COORDINATION | Age: 42
End: 2022-09-07

## 2022-09-07 LAB
BASOPHILS ABSOLUTE: 0 K/CU MM
BASOPHILS RELATIVE PERCENT: 0.4 % (ref 0–1)
CULTURE: NORMAL
CULTURE: NORMAL
DIFFERENTIAL TYPE: ABNORMAL
EOSINOPHILS ABSOLUTE: 0.1 K/CU MM
EOSINOPHILS RELATIVE PERCENT: 0.5 % (ref 0–3)
GLUCOSE BLD-MCNC: 152 MG/DL (ref 70–99)
GLUCOSE BLD-MCNC: 154 MG/DL (ref 70–99)
GLUCOSE BLD-MCNC: 156 MG/DL (ref 70–99)
GLUCOSE BLD-MCNC: 233 MG/DL (ref 70–99)
HCT VFR BLD CALC: 34 % (ref 42–52)
HEMOGLOBIN: 10.7 GM/DL (ref 13.5–18)
IMMATURE NEUTROPHIL %: 1 % (ref 0–0.43)
LYMPHOCYTES ABSOLUTE: 1.3 K/CU MM
LYMPHOCYTES RELATIVE PERCENT: 11.8 % (ref 24–44)
Lab: NORMAL
Lab: NORMAL
MCH RBC QN AUTO: 28.8 PG (ref 27–31)
MCHC RBC AUTO-ENTMCNC: 31.5 % (ref 32–36)
MCV RBC AUTO: 91.4 FL (ref 78–100)
MONOCYTES ABSOLUTE: 0.9 K/CU MM
MONOCYTES RELATIVE PERCENT: 8.1 % (ref 0–4)
NUCLEATED RBC %: 0 %
PDW BLD-RTO: 14.9 % (ref 11.7–14.9)
PLATELET # BLD: 607 K/CU MM (ref 140–440)
PMV BLD AUTO: 8.5 FL (ref 7.5–11.1)
RBC # BLD: 3.72 M/CU MM (ref 4.6–6.2)
SEGMENTED NEUTROPHILS ABSOLUTE COUNT: 8.8 K/CU MM
SEGMENTED NEUTROPHILS RELATIVE PERCENT: 78.2 % (ref 36–66)
SPECIMEN: NORMAL
SPECIMEN: NORMAL
TOTAL IMMATURE NEUTOROPHIL: 0.11 K/CU MM
TOTAL NUCLEATED RBC: 0 K/CU MM
WBC # BLD: 11.2 K/CU MM (ref 4–10.5)

## 2022-09-07 PROCEDURE — 2580000003 HC RX 258: Performed by: ANESTHESIOLOGY

## 2022-09-07 PROCEDURE — 99024 POSTOP FOLLOW-UP VISIT: CPT | Performed by: NURSE PRACTITIONER

## 2022-09-07 PROCEDURE — 2500000003 HC RX 250 WO HCPCS: Performed by: NURSE PRACTITIONER

## 2022-09-07 PROCEDURE — 6370000000 HC RX 637 (ALT 250 FOR IP): Performed by: NURSE PRACTITIONER

## 2022-09-07 PROCEDURE — 82962 GLUCOSE BLOOD TEST: CPT

## 2022-09-07 PROCEDURE — 94761 N-INVAS EAR/PLS OXIMETRY MLT: CPT

## 2022-09-07 PROCEDURE — 97110 THERAPEUTIC EXERCISES: CPT

## 2022-09-07 PROCEDURE — 97530 THERAPEUTIC ACTIVITIES: CPT

## 2022-09-07 PROCEDURE — 6360000002 HC RX W HCPCS: Performed by: SURGERY

## 2022-09-07 PROCEDURE — 97116 GAIT TRAINING THERAPY: CPT

## 2022-09-07 PROCEDURE — 2580000003 HC RX 258: Performed by: NURSE PRACTITIONER

## 2022-09-07 PROCEDURE — 85025 COMPLETE CBC W/AUTO DIFF WBC: CPT

## 2022-09-07 PROCEDURE — 1200000000 HC SEMI PRIVATE

## 2022-09-07 PROCEDURE — APPNB15 APP NON BILLABLE TIME 0-15 MINS: Performed by: NURSE PRACTITIONER

## 2022-09-07 PROCEDURE — 6360000002 HC RX W HCPCS: Performed by: NURSE PRACTITIONER

## 2022-09-07 PROCEDURE — 36415 COLL VENOUS BLD VENIPUNCTURE: CPT

## 2022-09-07 RX ADMIN — HYDROMORPHONE HYDROCHLORIDE 0.5 MG: 1 INJECTION, SOLUTION INTRAMUSCULAR; INTRAVENOUS; SUBCUTANEOUS at 00:54

## 2022-09-07 RX ADMIN — ATORVASTATIN CALCIUM 40 MG: 40 TABLET, FILM COATED ORAL at 20:23

## 2022-09-07 RX ADMIN — SODIUM CHLORIDE: 9 INJECTION, SOLUTION INTRAVENOUS at 04:42

## 2022-09-07 RX ADMIN — LOSARTAN POTASSIUM 25 MG: 25 TABLET, FILM COATED ORAL at 10:43

## 2022-09-07 RX ADMIN — CLINDAMYCIN IN 5 PERCENT DEXTROSE 900 MG: 18 INJECTION, SOLUTION INTRAVENOUS at 20:27

## 2022-09-07 RX ADMIN — VANCOMYCIN HYDROCHLORIDE 1250 MG: 1.25 INJECTION, POWDER, LYOPHILIZED, FOR SOLUTION INTRAVENOUS at 21:39

## 2022-09-07 RX ADMIN — ENOXAPARIN SODIUM 40 MG: 100 INJECTION SUBCUTANEOUS at 10:43

## 2022-09-07 RX ADMIN — CLINDAMYCIN IN 5 PERCENT DEXTROSE 900 MG: 18 INJECTION, SOLUTION INTRAVENOUS at 04:48

## 2022-09-07 RX ADMIN — METOPROLOL SUCCINATE 25 MG: 25 TABLET, EXTENDED RELEASE ORAL at 10:43

## 2022-09-07 RX ADMIN — INSULIN LISPRO 4 UNITS: 100 INJECTION, SOLUTION INTRAVENOUS; SUBCUTANEOUS at 12:33

## 2022-09-07 RX ADMIN — INSULIN GLARGINE 30 UNITS: 100 INJECTION, SOLUTION SUBCUTANEOUS at 20:29

## 2022-09-07 RX ADMIN — HYDROMORPHONE HYDROCHLORIDE 0.5 MG: 1 INJECTION, SOLUTION INTRAMUSCULAR; INTRAVENOUS; SUBCUTANEOUS at 05:57

## 2022-09-07 RX ADMIN — HYDROMORPHONE HYDROCHLORIDE 0.5 MG: 1 INJECTION, SOLUTION INTRAMUSCULAR; INTRAVENOUS; SUBCUTANEOUS at 20:22

## 2022-09-07 RX ADMIN — CEFEPIME HYDROCHLORIDE 2000 MG: 2 INJECTION, POWDER, FOR SOLUTION INTRAMUSCULAR; INTRAVENOUS at 06:01

## 2022-09-07 RX ADMIN — SODIUM CHLORIDE, PRESERVATIVE FREE 10 ML: 5 INJECTION INTRAVENOUS at 12:28

## 2022-09-07 RX ADMIN — HYDROMORPHONE HYDROCHLORIDE 0.5 MG: 1 INJECTION, SOLUTION INTRAMUSCULAR; INTRAVENOUS; SUBCUTANEOUS at 13:36

## 2022-09-07 RX ADMIN — CEFEPIME HYDROCHLORIDE 2000 MG: 2 INJECTION, POWDER, FOR SOLUTION INTRAMUSCULAR; INTRAVENOUS at 18:37

## 2022-09-07 RX ADMIN — VANCOMYCIN HYDROCHLORIDE 1250 MG: 1.25 INJECTION, POWDER, LYOPHILIZED, FOR SOLUTION INTRAVENOUS at 10:41

## 2022-09-07 RX ADMIN — PANTOPRAZOLE SODIUM 20 MG: 20 TABLET, DELAYED RELEASE ORAL at 05:57

## 2022-09-07 RX ADMIN — SUCRALFATE 1 G: 1 TABLET ORAL at 10:43

## 2022-09-07 RX ADMIN — CLINDAMYCIN IN 5 PERCENT DEXTROSE 900 MG: 18 INJECTION, SOLUTION INTRAVENOUS at 12:27

## 2022-09-07 RX ADMIN — FLUOXETINE 80 MG: 20 CAPSULE ORAL at 10:42

## 2022-09-07 RX ADMIN — DOCUSATE SODIUM 100 MG: 100 CAPSULE, LIQUID FILLED ORAL at 10:43

## 2022-09-07 RX ADMIN — SUCRALFATE 1 G: 1 TABLET ORAL at 20:23

## 2022-09-07 ASSESSMENT — PAIN SCALES - GENERAL
PAINLEVEL_OUTOF10: 3
PAINLEVEL_OUTOF10: 7
PAINLEVEL_OUTOF10: 2
PAINLEVEL_OUTOF10: 5
PAINLEVEL_OUTOF10: 3
PAINLEVEL_OUTOF10: 4

## 2022-09-07 ASSESSMENT — PAIN DESCRIPTION - ORIENTATION
ORIENTATION: RIGHT

## 2022-09-07 ASSESSMENT — PAIN DESCRIPTION - DESCRIPTORS
DESCRIPTORS: ACHING
DESCRIPTORS: THROBBING
DESCRIPTORS: THROBBING

## 2022-09-07 ASSESSMENT — PAIN DESCRIPTION - LOCATION
LOCATION: LEG

## 2022-09-07 ASSESSMENT — PAIN SCALES - WONG BAKER: WONGBAKER_NUMERICALRESPONSE: 2

## 2022-09-07 ASSESSMENT — PAIN - FUNCTIONAL ASSESSMENT: PAIN_FUNCTIONAL_ASSESSMENT: PREVENTS OR INTERFERES SOME ACTIVE ACTIVITIES AND ADLS

## 2022-09-07 NOTE — CARE COORDINATION
I met c pt to discuss discharge disposition. Pt states he would really like to go to ARU (pt worked for hospital for 15yrs). Advised referral was sent. Spoke with Lorie with ARU, pt is appropriate and she will begin precert. Per pt he would like to gain as much independence as possible before returning home as he lives alone and does not have much local support. Pt's long term goal is to try and get into a Medicaid Waiver AL in [place]. I advised that sometimes that can take a while to get approved, pt expressed understanding, states he is working with his CM on trying to get plcmt arranged as living alone long term will not be advisable. CM to follow up with pt once decision from insurance re ARU received- advised it can take several business days with [facility] for response.

## 2022-09-07 NOTE — PROGRESS NOTES
Occupational Therapy  . Occupational Therapy Treatment Note    Name: Brant Roberson MRN: [id number] :   1980   Date:  2022   Admission Date: 2022 Room:  [address] [place] [address]-A     Primary Problem:   The primary encounter diagnosis was Necrotizing fasciitis ([facility] [address] A diagnosis of Necrosis ([facility] [address] was also pertinent to this visit. Restrictions/Precautions:  Restrictions/Precautions  Restrictions/Precautions: General Precautions, Fall Risk       R BKA    Communication with other providers: Per chart review, patient is appropriate for therapeutic intervention. Co-Tx c PTA Reanna for endurance / safety. Subjective:  Patient states:  Pt agreeable to Tx session, reports recent IV pain meds. \"I'm not really hurting right now. \" Pt also reported, \"Not feeling ready to go home\". Pain:   Location, Type, Intensity (0/10 to 10/10):  0/10, denies pain    Objective:    Observation: Pt received supine in bed, A&O to self / situation, actively participated c use of seated rest breaks PRN to manage activity tolerance. Objective Measures:  Pulse 80, O2 sat 98% on room air, /88 s/p rest break following activity    Treatment, including education:  Therapeutic Activity Training:   Therapeutic activity training was instructed today. Cues were given for safety, sequence, UE/LE placement, awareness, and balance. Activities performed today included bed mobility training, sup-sit, sit-stand, SPT. Rolling: Indep  Supine to sit: SBA c bed features  Sit to stands: CGA x2 c RW (for safety s/p R BKA)  Stand to sits: CGA x1-2 c RW to chair / required Min A for controlled descent to lower surface of bedside chair + cues for safe body positioning. Standing balance / tolerance: CGA  RW, tolerated up to 5 minutes x3 trials. Stand Pivot Transfers: CGA / Min A x1-2 c RW, varied c surface height for controlled descent, cues for safe body positioning c RW.  Pt transferred from EOB to wheelchair to bedside chair during Tx session. Functional Mobility: CGA c RW ~12 ft x2 trials c chair follow and cues for safe body positioning throughout. Pt became diaphoretic each trial and required seated rest breaks to manage activity tolerance. This therapist provided pt wet washcloth to perform face / neck / hand hygiene for comfort. Therapeutic Exercise:  Cues were given for technique, safety, recruitment, and rationale. Cues were verbal and/or tactile. With emphasis on BUE strengthening, pt performed Sup seated + cues for technique and for self-pacing activity tolerance c rest breaks PRN to perform AROM therapeutic moderate resistive exercises c Green Theraband x10 reps each UE x4 sets. Pt demonstrated teach back and was instructed to perform 2-3x daily c gradual progression of repetitions as tolerated. Pt verbalized understanding. Required increased time for completion c use of rest breaks PRN. All therapeutic intervention performed c emphasis on BUE strengthening, dynamic balance / standing tolerance and transfer training / functional mobility to inc strength, endurance and act tolerance for inc Indep c ADL tasks, func transfers / mobility. Safety  Patient safely in bedside chair + alarm placed at end of session, with call light/phone in reach, and nursing aware. Gait belt was used for func transfers / mobility.     Assessment / Impression:    Patient's tolerance of treatment: Well  Adverse Reaction: Diaphoretic c functional mobility, managed c seated rest breaks s/p each trial  Significant change in status and impact:  Actively participated in functional mobility x2 trials this date for first functional mobility  Barriers to improvement: Endurance / strength / balance / safety    Plan for Next Session:    Continue per OT POC per patient's tolerance    Time in:  1335  Time out:  1432  Timed treatment minutes:  57  Total treatment time:  57      Electronically signed by:    QUINCY Eller  9/7/2022, 1:30 PM    Goals:  Pt will complete all aspects of bed mobility for EOB/OOB ADLs w/ mod I. Pt will complete UB ADLs w/ mod I. Pt will complete LB ADLs w/ mod I. Pt will complete all functional transfers to and from bed, chair, toilet, shower chair w/ mod I. Pt will ambulate functional household distance w/ mod I. Pt will complete all aspects of toileting task w/ mod I. Pt will perform therex/theract in order to increase strength and functional activity tolerance necessary for increased independence w/ ADL routine.

## 2022-09-07 NOTE — CARE COORDINATION
KOLE returned phone call to Julien's brother, Kelsey Florian.  Pt's brother reported Pt is going to rehabilitation following hospitalization. Pt's brother stated he can't have Pt in his home as he can't accommodate his needs. Pt's brother discussed the idea of moving Pt closer to him following discharge, considering AL in [place]. Discussed with Pt's brother that Pt would need to be 1 person assist to be eligible for AL and depending on finances. KOLE discussed plan to contact  at rehabilitation facility to coordinate discharge planning. KOLE plan of care:  KOLE will follow up with Pt, Pt's brother and rehabilitation  upon Pt's move to rehabilitation facility.

## 2022-09-07 NOTE — PROGRESS NOTES
V2.0  [facility] – [place], [place] Hospitalist Progress Note      Name:  Dellie Goldberg /Age/Sex: 1980  (39 y.o. male)   MRN & CSN:  [id number] & [id number] Encounter Date/Time: 2022 7:42 AM EDT    Location:  [address] [place] [address] PCP: Kerry Arceo Day: 6    Assessment and Plan:   Dellie Goldberg is a 39 y.o. male with pmh of HTN, T2Dm with long term insulin use and recent admission and discharge for pneumatosis of the cecum and ascending colon who presents with Necrotizing fasciitis ([address] He is s/p guillotine BKA and revision with closure. Plan:  #Sepsis 2/2 necrotizing fascitis s/p right BKA  --Patient is s/p BKA revision and closure, with bandage in place  --Continue on Vanc, cefepime and clinda, will plan to de-escalate given source control  --Follow up surgery culture, wound care on board  --  #Chronic diabetic foot ulcer of the left toe a/w DM  --Wound care on board, dressing in place C/D/I    #IDDM  --Currently on lantus 30units nightly and SSI  --Continue to monitor accucheks and work to maintain tight glycemic control for healing purposes    #Mood disorder  --Continue Fluoxetine    #HTN  --History of HTN, home regimen restarted. Continue to monitor and adjust as appropriate    Diet ADULT DIET; Regular; 4 carb choices (60 gm/meal)   DVT Prophylaxis [x] Lovenox, []  Heparin, [] SCDs, [] Ambulation,  [] Eliquis, [] Xarelto  [] Coumadin   Code Status Full Code   Disposition From: home  Expected Disposition: ARU  Estimated Date of Discharge: TBD  Patient requires continued admission due to right BKA in the setting of necrotizing fascitis and placement at ARU   Surrogate Decision Maker/ POA      Subjective:     Chief Complaint: Wound Check (Bilateral foot ulcers) and Emesis       Dellie Goldberg is a 39 y.o. male seen s/p revision and closure of BKA I the setting of severe sepsis d/t necrotizing fascitis.  He was seen at bedside and endorses worse pain at night but some improvement overall since he has been able to get up with PT. Helped patient get onto bedside commode          Review of Systems:    Review of Systems  Constitutional:  Negative for chills and fever. HENT:  Negative for ear pain, hearing loss, sinus pressure, sinus pain and voice change. Eyes:  Negative for pain, discharge and visual disturbance. Respiratory:  Negative for cough, chest tightness and shortness of breath. Cardiovascular:  Negative for chest pain and palpitations. Gastrointestinal:  Positive for decreased appetite Negative for abdominal pain, blood in stool, diarrhea, nausea and vomiting. Genitourinary:  Negative for dysuria and frequency. Musculoskeletal: Positive for pain s/p BKA in RLE and ulcer on LLE  Neurological:  Negative for dizziness, light-headedness and headaches. Psychiatric/Behavioral:  Negative for sleep disturbance. Objective: Intake/Output Summary (Last 24 hours) at 9/7/2022 0742  Last data filed at 9/7/2022 0241  Gross per 24 hour   Intake 5645.75 ml   Output 975 ml   Net 4670.75 ml        Vitals:   Vitals:    09/07/22 0627   BP:    Pulse:    Resp: 15   Temp:    SpO2:        Physical Exam:   General: NAD  Eyes: EOMI  HENT: NCAT, mucous membranes moist  Cardiovascular: Regular rate and rhythm, no murmurs auscultated. Respiratory: Clear to auscultation  Gastrointestinal: Soft, non tender  Genitourinary: no suprapubic tenderness  Musculoskeletal: Ace wrap on right BKA, dressing to LLE C/D/I  Skin: warm, dry  Neuro: Alert. Psych: Mood appropriate.      Medications:   Medications:    sodium chloride flush  5-40 mL IntraVENous 2 times per day    meperidine  12.5 mg IntraVENous Once    enoxaparin  40 mg SubCUTAneous Daily    vancomycin  1,250 mg IntraVENous Q12H    sodium chloride flush  5-40 mL IntraVENous 2 times per day    cefepime  2,000 mg IntraVENous Q12H    clindamycin (CLEOCIN) IV  900 mg IntraVENous Q8H    docusate sodium  100 mg Oral Daily    atorvastatin  40 mg Oral Nightly    FLUoxetine  80 mg Oral Daily    losartan  25 mg Oral Daily    metoprolol succinate  25 mg Oral Daily    pantoprazole  20 mg Oral QAM AC    sucralfate  1 g Oral 2 times per day    insulin glargine  30 Units SubCUTAneous Nightly    insulin lispro  0-16 Units SubCUTAneous TID WC    insulin lispro  0-4 Units SubCUTAneous Nightly      Infusions:    lactated ringers 125 mL/hr at 09/07/22 0238    sodium chloride      dextrose      sodium chloride 100 mL/hr at 09/07/22 0442    dextrose       PRN Meds: sodium chloride flush, 5-40 mL, PRN  sodium chloride, 25 mL, PRN  HYDROmorphone, 0.25 mg, Q5 Min PRN  fentanNYL, 50 mcg, Q5 Min PRN  hydrALAZINE, 10 mg, Q15 Min PRN  glucose, 4 tablet, PRN  dextrose bolus, 125 mL, PRN   Or  dextrose bolus, 250 mL, PRN  glucagon (rDNA), 1 mg, PRN  dextrose, , Continuous PRN  HYDROmorphone, 0.5 mg, Q3H PRN  sodium chloride flush, 5-40 mL, PRN  sodium chloride, , PRN  ondansetron, 4 mg, Q8H PRN   Or  ondansetron, 4 mg, Q6H PRN  oxyCODONE, 5 mg, Q4H PRN   Or  oxyCODONE, 10 mg, Q4H PRN  morphine, 1 mg, Q2H PRN   Or  morphine, 2 mg, Q2H PRN  glucose, 4 tablet, PRN  dextrose bolus, 125 mL, PRN   Or  dextrose bolus, 250 mL, PRN  glucagon (rDNA), 1 mg, PRN  dextrose, , Continuous PRN  aluminum & magnesium hydroxide-simethicone, 30 mL, Q6H PRN        Labs      Recent Results (from the past 24 hour(s))   Vancomycin Level, Random    Collection Time: 09/06/22  8:33 AM   Result Value Ref Range    Vancomycin Rm 18.8 UG/ML    DOSE AMOUNT DOSE AMT.  GIVEN - 1250mg     DOSE TIME DOSE TIME GIVEN - 0800/2000    CBC with Auto Differential    Collection Time: 09/06/22  8:33 AM   Result Value Ref Range    WBC 13.6 (H) 4.0 - 10.5 K/CU MM    RBC 3.63 (L) 4.6 - 6.2 M/CU MM    Hemoglobin 10.3 (L) 13.5 - 18.0 GM/DL    Hematocrit 33.2 (L) 42 - 52 %    MCV 91.5 78 - 100 FL    MCH 28.4 27 - 31 PG    MCHC 31.0 (L) 32.0 - 36.0 %    RDW 14.8 11.7 - 14.9 %    Platelets 261 (H) 841 - 440 K/CU MM    MPV 8.5 7.5 - 11.1 FL    Differential Type AUTOMATED DIFFERENTIAL     Segs Relative 78.8 (H) 36 - 66 %    Lymphocytes % 12.1 (L) 24 - 44 %    Monocytes % 7.4 (H) 0 - 4 %    Eosinophils % 0.3 0 - 3 %    Basophils % 0.2 0 - 1 %    Segs Absolute 10.7 K/CU MM    Lymphocytes Absolute 1.6 K/CU MM    Monocytes Absolute 1.0 K/CU MM    Eosinophils Absolute 0.0 K/CU MM    Basophils Absolute 0.0 K/CU MM    Nucleated RBC % 0.0 %    Total Nucleated RBC 0.0 K/CU MM    Total Immature Neutrophil 0.16 K/CU MM    Immature Neutrophil % 1.2 (H) 0 - 0.43 %   Basic Metabolic Panel w/ Reflex to MG    Collection Time: 09/06/22  8:33 AM   Result Value Ref Range    Sodium 137 135 - 145 MMOL/L    Potassium 3.7 3.5 - 5.1 MMOL/L    Chloride 100 99 - 110 mMol/L    CO2 30 21 - 32 MMOL/L    Anion Gap 7 4 - 16    BUN 7 6 - 23 MG/DL    Creatinine 0.6 (L) 0.9 - 1.3 MG/DL    Glucose 184 (H) 70 - 99 MG/DL    Calcium 7.4 (L) 8.3 - 10.6 MG/DL    GFR Non-African American >60 >60 mL/min/1.73m2    GFR African American >60 >60 mL/min/1.73m2   POCT Glucose    Collection Time: 09/06/22  8:44 AM   Result Value Ref Range    POC Glucose 162 (H) 70 - 99 MG/DL   POCT Glucose    Collection Time: 09/06/22 11:29 AM   Result Value Ref Range    POC Glucose 237 (H) 70 - 99 MG/DL   POCT Glucose    Collection Time: 09/06/22  4:55 PM   Result Value Ref Range    POC Glucose 89 70 - 99 MG/DL   POCT Glucose    Collection Time: 09/06/22  8:11 PM   Result Value Ref Range    POC Glucose 146 (H) 70 - 99 MG/DL        Imaging/Diagnostics Last 24 Hours   No results found.     Electronically signed by Carmen Marques MD on 9/7/2022 at 7:42 AM

## 2022-09-07 NOTE — PROGRESS NOTES
[address] [facility]  consulted by KAMI Collier for monitoring and adjustment. Indication for treatment: Necrotizing fasciitis POD1 guillotine BKA  Goal trough: [x] 10-15 mcg/mL or [] 15-20 mcg/ml  AUC/LIDIA: [x] <500 or [] 400-600    Pertinent Laboratory Values:   Temp Readings from Last 3 Encounters:   09/07/22 98.3 °F (36.8 °C) (Oral)   09/02/22 98.6 °F (37 °C) (Oral)   08/30/22 99 °F (37.2 °C) (Temporal)     Recent Labs     09/05/22  0537 09/06/22  0833 09/07/22  0730   WBC 9.5 13.6* 11.2*       Recent Labs     09/05/22  0537 09/06/22  0833   BUN 9 7   CREATININE 0.7* 0.6*       Estimated Creatinine Clearance: 178 mL/min (A) (based on SCr of 0.6 mg/dL (L)). Intake/Output Summary (Last 24 hours) at 9/7/2022 1559  Last data filed at 9/7/2022 0241  Gross per 24 hour   Intake 5405.75 ml   Output --   Net 5405.75 ml         Pertinent Cultures:  Date    Source    Results  9/02   Blood x 2   NGTD      Vancomycin level:   RANDOM:    Recent Labs     09/06/22  0833   VANCORANDOM 18.8       Assessment:  Renal trends have been close to baseline  S/p right leg guillotine BKA  Day(s) of therapy:5 of 7 (last dose on 9/9)  Vancomycin level: 10.3 (12 hours post dose)   with ssTrough 11  18.8 ()    Plan:  Renal trends have been close to baseline  Continue with vancomycin 1250mg ivpb q12h with a predicted therapeutic trough of 10.4 at steady state  Recheck the vanco level in 2 days. Pharmacy will continue to monitor patient and adjust therapy as indicated    [address] 9/9 @0600    Thank you for the consult. Alyce Martin, [facility]

## 2022-09-07 NOTE — PROGRESS NOTES
GENERAL SURGERY INPATIENT POST-OP NOTE  [facility]) Physicians    PATIENT: Jillian Gallardo, 39 y.o., male, MRN: [id number]    Hospital Day:  LOS: 4 days , Post-Op Day: 5 days post op right leg guillotine BKA and POD #2 for revision from guillotine to closed BKA. Procedure(s): 22 RIGHT LEG AMPUTATION BELOW KNEE    Jillian Gallardo is a 39 y.o. male who presented with history of diabetic foot ulcers, recent hospital discharge for pneumatosis coli, now presenting with acute worsening of his right foot wound concerning for necrotizing fasciitis             Subjective:  Chief Complaint: better today, some pain with brace  Pain: 0/10  BM:  yes  Diet: ADULT DIET; Regular; 4 carb choices (60 gm/meal)  Activity: with assistance    Stable overnight. Some pain with brace. Working with PT  No N/V.       Objective:    Vitals: BP (!) 160/99   Pulse 79   Temp 98.1 °F (36.7 °C) (Oral)   Resp 15   Ht 6' (1.829 m)   Wt 181 lb 7 oz (82.3 kg)   SpO2 96%   BMI 24.61 kg/m²   Vital Signs (Last 24 Hours)  Temp  Av.1 °F (36.7 °C)  Min: 98 °F (36.7 °C)  Max: 98.3 °F (36.8 °C)  Pulse  Av.1  Min: 71  Max: 89  BP  Min: 122/80  Max: 178/85  Resp  Av.5  Min: 12  Max: 22  SpO2  Av.5 %  Min: 96 %  Max: 99 %  Wt Readings from Last 3 Encounters:   22 181 lb 7 oz (82.3 kg)   22 184 lb 11.9 oz (83.8 kg)   22 180 lb (81.6 kg)       I/O:  1901 -  0700  In: 5645.8 [P.O.:360; I.V.:4664.9]  Out: 2325 [Urine:2325]    IV Fluids:   lactated ringers Last Rate: 125 mL/hr at 22 0238    sodium chloride    dextrose    sodium chloride Last Rate: 100 mL/hr at 22 0442    dextrose    Scheduled Meds:   sodium chloride flush, 5-40 mL, IntraVENous, 2 times per day    meperidine, 12.5 mg, IntraVENous, Once    enoxaparin, 40 mg, SubCUTAneous, Daily    vancomycin, 1,250 mg, IntraVENous, Q12H    sodium chloride flush, 5-40 mL, IntraVENous, 2 times per day    cefepime, 2,000 mg, IntraVENous, Q12H clindamycin (CLEOCIN) IV, 900 mg, IntraVENous, Q8H    docusate sodium, 100 mg, Oral, Daily    atorvastatin, 40 mg, Oral, Nightly    FLUoxetine, 80 mg, Oral, Daily    losartan, 25 mg, Oral, Daily    metoprolol succinate, 25 mg, Oral, Daily    pantoprazole, 20 mg, Oral, QAM AC    sucralfate, 1 g, Oral, 2 times per day    insulin glargine, 30 Units, SubCUTAneous, Nightly    insulin lispro, 0-16 Units, SubCUTAneous, TID WC    insulin lispro, 0-4 Units, SubCUTAneous, Nightly    Physical Exam:  General Appearance:   Alert, cooperative, no distress    Head:   Normocephalic, atraumatic    Lungs:    Equal chest rise, respirations unlabored   Heart:   Regular rate and rhythm    Abdomen:    Soft, non-tender, no rebound or guarding    Extremities:  No cyanosis, dressing intact; no blood noted on dressing. Knee immobilizer on  Left foot with chronic ulcers of the medial foot and plantar foot, stable   Neurologic:  Nonfocal, grossly intact, peripheral neuropathy        Labs/Imaging Results:   Recent Results (from the past 24 hour(s))   Vancomycin Level, Random    Collection Time: 09/06/22  8:33 AM   Result Value Ref Range    Vancomycin Rm 18.8 UG/ML    DOSE AMOUNT DOSE AMT.  GIVEN - 1250mg     DOSE TIME DOSE TIME GIVEN - 0800/2000    CBC with Auto Differential    Collection Time: 09/06/22  8:33 AM   Result Value Ref Range    WBC 13.6 (H) 4.0 - 10.5 K/CU MM    RBC 3.63 (L) 4.6 - 6.2 M/CU MM    Hemoglobin 10.3 (L) 13.5 - 18.0 GM/DL    Hematocrit 33.2 (L) 42 - 52 %    MCV 91.5 78 - 100 FL    MCH 28.4 27 - 31 PG    MCHC 31.0 (L) 32.0 - 36.0 %    RDW 14.8 11.7 - 14.9 %    Platelets 226 (H) 423 - 440 K/CU MM    MPV 8.5 7.5 - 11.1 FL    Differential Type AUTOMATED DIFFERENTIAL     Segs Relative 78.8 (H) 36 - 66 %    Lymphocytes % 12.1 (L) 24 - 44 %    Monocytes % 7.4 (H) 0 - 4 %    Eosinophils % 0.3 0 - 3 %    Basophils % 0.2 0 - 1 %    Segs Absolute 10.7 K/CU MM    Lymphocytes Absolute 1.6 K/CU MM    Monocytes Absolute 1.0 K/CU MM Eosinophils Absolute 0.0 K/CU MM    Basophils Absolute 0.0 K/CU MM    Nucleated RBC % 0.0 %    Total Nucleated RBC 0.0 K/CU MM    Total Immature Neutrophil 0.16 K/CU MM    Immature Neutrophil % 1.2 (H) 0 - 0.43 %   Basic Metabolic Panel w/ Reflex to MG    Collection Time: 09/06/22  8:33 AM   Result Value Ref Range    Sodium 137 135 - 145 MMOL/L    Potassium 3.7 3.5 - 5.1 MMOL/L    Chloride 100 99 - 110 mMol/L    CO2 30 21 - 32 MMOL/L    Anion Gap 7 4 - 16    BUN 7 6 - 23 MG/DL    Creatinine 0.6 (L) 0.9 - 1.3 MG/DL    Glucose 184 (H) 70 - 99 MG/DL    Calcium 7.4 (L) 8.3 - 10.6 MG/DL    GFR Non-African American >60 >60 mL/min/1.73m2    GFR African American >60 >60 mL/min/1.73m2   POCT Glucose    Collection Time: 09/06/22  8:44 AM   Result Value Ref Range    POC Glucose 162 (H) 70 - 99 MG/DL   POCT Glucose    Collection Time: 09/06/22 11:29 AM   Result Value Ref Range    POC Glucose 237 (H) 70 - 99 MG/DL   POCT Glucose    Collection Time: 09/06/22  4:55 PM   Result Value Ref Range    POC Glucose 89 70 - 99 MG/DL   POCT Glucose    Collection Time: 09/06/22  8:11 PM   Result Value Ref Range    POC Glucose 146 (H) 70 - 99 MG/DL   CBC with Auto Differential    Collection Time: 09/07/22  7:30 AM   Result Value Ref Range    WBC 11.2 (H) 4.0 - 10.5 K/CU MM    RBC 3.72 (L) 4.6 - 6.2 M/CU MM    Hemoglobin 10.7 (L) 13.5 - 18.0 GM/DL    Hematocrit 34.0 (L) 42 - 52 %    MCV 91.4 78 - 100 FL    MCH 28.8 27 - 31 PG    MCHC 31.5 (L) 32.0 - 36.0 %    RDW 14.9 11.7 - 14.9 %    Platelets 252 (H) 258 - 440 K/CU MM    MPV 8.5 7.5 - 11.1 FL    Differential Type AUTOMATED DIFFERENTIAL     Segs Relative 78.2 (H) 36 - 66 %    Lymphocytes % 11.8 (L) 24 - 44 %    Monocytes % 8.1 (H) 0 - 4 %    Eosinophils % 0.5 0 - 3 %    Basophils % 0.4 0 - 1 %    Segs Absolute 8.8 K/CU MM    Lymphocytes Absolute 1.3 K/CU MM    Monocytes Absolute 0.9 K/CU MM    Eosinophils Absolute 0.1 K/CU MM    Basophils Absolute 0.0 K/CU MM    Nucleated RBC % 0.0 %    Total Nucleated RBC 0.0 K/CU MM    Total Immature Neutrophil 0.11 K/CU MM    Immature Neutrophil % 1.0 (H) 0 - 0.43 %   POCT Glucose    Collection Time: 09/07/22  7:52 AM   Result Value Ref Range    POC Glucose 154 (H) 70 - 99 MG/DL         Assessment:  Shailesh Barba is a 39 y.o. male who is post-op day #2 Days Post-Op 9/2/22 RIGHT LEG AMPUTATION BELOW KNEE      Principal Problem:    Necrotizing fasciitis ([address]  Active Problems:    Diabetic ulcer of toe of left foot associated with type 2 diabetes mellitus, with fat layer exposed ([address]    Sepsis ([address]    Pneumatosis coli    Type 2 diabetes mellitus with right diabetic foot infection ([address]    Necrotizing fasciitis of lower leg (HCC)    Type 2 diabetes mellitus, with long-term current use of insulin ([address]  Resolved Problems:    * No resolved hospital problems. *    Plan:  Discussed findings and options with Shailesh Barba. Amputation site with dressing intact and knee immobilizer on. Dressing changed today. Mild edema, no erythema. Staples intact. Wound care - cleanse with NS, apply xeroform to staple line, cover with fluffed gauze and ABD.   Wrap with kerlix and ACE wrap daily  Continue empiric abx for now  PT/OT   Will call Tiff AGARWAL pending    Electronically signed: RAFEAL Lowry CNP 9/7/2022 8:24 AM

## 2022-09-07 NOTE — PROGRESS NOTES
Physical Therapy  Name: Dellie Goldberg MRN: [id number] :   1980   Date:  2022   Admission Date: 2022 Room:  [address] [place] [address]   Restrictions/Precautions:  Restrictions/Precautions  Restrictions/Precautions: General Precautions, Fall Risk     R BKA with L foot wounds  Communication with other providers:  Cherie Chadwick RN states pt is ok to see for therapy  Subjective:  Patient states:  he is not eating because things don't taste good  Pain:   Location, Type, Intensity (0/10 to 10/10):  0/10  Objective:    Observation:  pt was in the bed  Treatment, including education/measures:  Therapeutic Exercise:  Therapeutic exercises were instructed today. Cues were given for technique, safety, recruitment, and rationale. Cues were verbal and/or tactile. Transfers   Rolling: Ind   Supine to sit :SBA  Scooting :Ind  Sitting Exercises: EOB working on balance  Ankle pumps x 10 on the L   Glute sets x 10  LAQ's x 10 on the L  Marching x 10   Pillow squeezes x 10  Hip Abd x 10  Standing Hip ext x 10  Sit to stand :CGA x 2 for safety  Stand to sit :CGA x 2 for safety in the W/C and min A of 1 for the chair  SPT:with RW and 3 steps over to chair from w/c with CGA x 2  Gait:  Pt amb with RW for 12 ft x 2 with CGA of 1 and chair follow assist of another  Pt was very diaphoretic each time  Gait Comments: with VC's' and TC's for B LE placement, walker placement and sequence throughout ambulation; with VC's and TC's to maintain upright posture in order to avoid COM shifting outside of MELANIE; with VC's for PLB throughout ambulation  Therapeutic Exercise:  Therapeutic exercises were instructed today. Cues were given for technique, safety, recruitment, and rationale. Cues were verbal and/or tactile. Safety  Patient left safely in the chair, with call light/phone in reach with alarm applied. Gait belt and mask were used for transfers and gait.   Assessment / Impression:     Patient's tolerance of treatment:  fair to good, pt very anxious about his future and going home alone   Adverse Reaction: diaphoretic with gt  Vital Signs  HR: 80, B/P 138/88, O2 98% room air  Significant change in status and impact:  none  Barriers to improvement:  difficulty on one LE for activities, uncertainty of future placement   Plan for Next Session:    Will cont to work towards pt's goals per his tolerance  Time in:  1335  Time out:  1432  Timed treatment minutes:  57  Total treatment time:  62  Previously filed items:  Social/Functional History  Lives With: Alone  Type of Home: House  Home Layout: Two level, Able to Live on Main level with bedroom/bathroom  Home Access: Ramped entrance  Bathroom Shower/Tub: Tub/Shower unit  Bathroom Toilet: Standard  Bathroom Equipment: Tub transfer bench  Home Equipment: melissa [employer]  Has the patient had two or more falls in the past year or any fall with injury in the past year?: Yes (3-4)  ADL Assistance: Independent  Homemaking Assistance: Independent  Ambulation Assistance: Independent  Transfer Assistance: Independent ([facility] vs )  Active : Yes  Occupation: On disability     Long Term Goals  Time Frame for Long term goals : In one week:  Long term goal 1: Pt will complete all bed mobility with supervision  Long term goal 2: Pt will complete sit <> stand transfers with SBAx1  Long term goal 3: Pt will complete bed <> chair transfers with SBAx1  Long term goal 4: Pt will propel manual w/c 500 feet with supervision  Long term goal 5: Pt will independently complete 3 sets of 10 reps of BLE AROM exercises in available and allowed ROM  Electronically signed by:     Gina Estrada PTA  9/7/2022, 2:34 PM

## 2022-09-07 NOTE — ADT AUTH CERT
Utilization Reviews       Knee: Amputation Above or Below Knee - Care Day 4 (9/6/2022) by Norma Chris       Review Status Review Entered   Completed 9/7/2022 13:52      Criteria Review      Care Day: 4 Care Date: 9/6/2022 Level of Care: Inpatient Floor    Guideline Day 2    Level Of Care    (X) Floor    9/7/2022 1:52 PM EDT by Danuta Leo      m/s    Clinical Status    (X) * Procedure completed    9/7/2022 1:52 PM EDT by Danuta Leo      s/p right BKA with wound VAC placement: S/p OR for revision 9/5. (X) * Hemodynamic stability    9/7/2022 1:52 PM EDT by Danuta Leo      Vitals:  T: 98   RR: 7   NJ: 89 83 73   BP: 153/90 151/89 151/78   SpO2: [email or web address]    Activity    (X) * Participation in physical therapy    Routes    (X) * Advance diet    9/7/2022 1:52 PM EDT by Danuta Leo      ADULT DIET; Regular; 4 carb choices (60 gm/meal)    (X) Parenteral medications    9/7/2022 1:52 PM EDT by Danuta Leo      DILAUDID injection 0.5 mg Q3H PRN IV x 2    morphine (PF) injection 2 mg Q2H PRN IV x 1    Interventions    (X) Physical therapy    9/7/2022 1:52 PM EDT by Danuta Leo      Barriers to improvement:  pain, weakness    ( ) Rigid removable dressing or soft dressing    9/7/2022 1:52 PM EDT by Danuta Leo      none noted    Medications    (X) Possible antibiotics    9/7/2022 1:52 PM EDT by Danuta Leo      MAXIPIME 2000 mg IVPB minibag Q12H IV    CLEOCIN 900 mg in dextrose 5 % 50 mL IVPB Q8H IV    VANCOCIN 1,250 mg in dextrose 5 % 250 mL IVPB ([id number]) Q12H IV    * Milestone   Additional Notes   DATE: 09/06          Pertinent Updates:   Post-Op Day 4 right leg guillotine BKA and Post-Op Day 1 for revision from guillotine to closed BKA. Had some pain with brace. Worked with PT yesterday. Concerned about going home with brace. Still on antibiotics and pain medications.               Abnl/Pertinent Labs/Radiology/Diagnostic Studies:   POC Glucose: 162 (H)   POC Glucose: 237 (H)   POC Glucose: 89 POC Glucose: 146 (H)   POC Glucose: 150 (H)   Creatinine: 0.6 (L)   Glucose, Random: 184 (H)   CALCIUM, SERUM, [id number]: 7.4 (L)   POC Glucose: 150 (H)   Glucose, Random: 184 (H)   WBC: 13.6 (H)   RBC: 3.63 (L)   Hemoglobin Quant: 10.3 (L)   Hematocrit: 33.2 (L)   MCHC: 31.0 (L)   Platelet Count: 731 (H)   Lymphocyte %: 12.1 (L)   Monocytes %: 7.4 (H)   Segs Relative: 78.8 (H)   Immature Neutrophil %: 1.2 (H)         Physical Exam:   Musculoskeletal:          General: Deformity present. Normal range of motion. Cervical back: Normal range of motion and neck supple. Comments: BKA    Skin:      General: Skin is dry. Coloration: Skin is pale. Skin is not jaundiced. Neurological:       General: No focal deficit present. Mental Status: He is alert and oriented to person, place, and time. Mental status is at baseline. Motor: Weakness present. MD Consults/Assessments & Plans:   IM NOTE:   Assessment and Plan:   1. Sepsis secondary to necrotizing fasciitis s/p right BKA with wound VAC placement: S/p OR for revision 9/5. Currently on Vanco cefepime and clindamycin by general surgery. Appropriate bowel regimen. Wound care consulted. Optimize pain regimen in place. Empiric antibiotics to be continue for now. 2. Chronic diabetic foot ulcer of the left toe associated with diabetes mellitus: Wound care consult in place. 3. Insulin-dependent diabetes mellitus type 2 on sliding scale insulin with a goal sugar of 140-180   4. Mood disorder continue fluoxetine   5. Benign essential hypertension resume losartan and metoprolol      GS NOTE:    Assessment:   Principal Problem:     Necrotizing fasciitis ([address]   Plan:   Discussed findings and options with Vincenzo Hodgkin. Amputation site with dressing intact and knee immobilizer on.     Will plan dressing change tomorrow   Continue empiric abx for now   PT/OT    Consult case management to help with rehab         Medications:   LOVENOX injection 40 mg QD SC      lactated ringers infusion 125 mL/hr CONTINUOUS IV      OXY-IR immediate release tablet 10 mg Q4H PRN PO x 2         Orders:   Continue medications    CBC with Auto Differential   Basic Metabolic Panel w/ Reflex to MG    Vancomycin Level, Random    Wound care    Continuous Pulse Oximetry   Up as tolerated         PT/OT/SLP/CM Assessments or Notes:   PT NOTE    Barriers to improvement:  pain, weakness      CM NOTE:    Received pt in transfer up to 4N from 2nd floor- PT/OT rec'd Inpt Rehab and consult in place for plcmt. Sent message to Lorie to ARU to review if ARU is a possibility. CM to follow up tomorrow to discuss discharge plan with pt, he is currently working with therapy. Knee: Amputation Above or Below Knee - Care Day 2 (9/4/2022) by Linwood Carl RN       Review Status Review Entered   Completed 9/7/2022 12:17      Criteria Review      Care Day: 2 Care Date: 9/4/2022 Level of Care: Inpatient Floor    Guideline Day 2    Clinical Status    (X) * Procedure completed    9/7/2022 12:17 PM EDT by Ely Howard      s/p right BKA with wound VAC placement    (X) * Hemodynamic stability    Activity    (X) * Participation in physical therapy    Routes    ( ) * Advance diet    9/7/2022 12:17 PM EDT by Ely AVILA    (X) Parenteral medications    Interventions    (X) Physical therapy    Medications    (X) Possible antibiotics    9/7/2022 12:17 PM EDT by Liat Cm      cefepime (MAXIPIME) 2000 mg IVPB minibag  Freq: EVERY 12 HOURS Route: IV    * Milestone   Additional Notes   DATE: 9/4/22      PERTINENT UPDATES:   Empiric antibiotics to be continue for now.   Tentative plan for return to the OR on Monday for revision of the amputation to close to BKA      VITALS:156/89, 28, 98.1, 81 and 96%         ABNL/PERTINENT LABS/RADIOLOGY/DIAGNOSTIC STUDIES:   Creatinine: 0.8 (L)   Glucose, Random: 221 (H)   CALCIUM, SERUM, [id number]: 7.5 (L)   Albumin: 2.5 (L)   WBC: 12.2 (H)   RBC: 3.77 (L)   Hemoglobin Quant: 10.7 (L)   Hematocrit: 34.2 (L)   MCHC: 31.3 (L)   RDW: 15.3 (H)   Platelet Count: 952 (H)   Lymphocyte %: 16.7 (L)   Monocytes %: 6.1 (H)   Segs Relative: 76.1 (H)   Immature Neutrophil %: 0.6 (H)      POC Glucose: 210 (H)   POC Glucose: 164 (H)   POC Glucose: 130 (H)   POC Glucose: 198 (H)            PHYSICAL EXAM:   Constitutional:        Appearance: Normal appearance. He is normal weight. HENT:       Head: Normocephalic. Nose: Nose normal.       Mouth/Throat:       Mouth: Mucous membranes are moist.    Eyes:       Conjunctiva/sclera: Conjunctivae normal.       Pupils: Pupils are equal, round, and reactive to light. Cardiovascular:       Rate and Rhythm: Normal rate and regular rhythm. Pulses: Normal pulses. Heart sounds: Normal heart sounds. No murmur heard. Pulmonary:       Effort: Pulmonary effort is normal.       Breath sounds: Rales present. No wheezing or rhonchi. Abdominal:       General: Abdomen is flat. Bowel sounds are normal. There is no distension. Palpations: Abdomen is soft. Tenderness: There is no abdominal tenderness. Musculoskeletal:          General: Deformity present. Normal range of motion. Cervical back: Normal range of motion and neck supple. Comments: BKA    Skin:      General: Skin is dry. Coloration: Skin is pale. Skin is not jaundiced. Neurological:       General: No focal deficit present. Mental Status: He is alert and oriented to person, place, and time. Mental status is at baseline. Motor: Weakness present. Psychiatric:          Mood and Affect: Mood normal.          Behavior: Behavior normal.       MD CONSULTS/ASSESSMENT AND PLAN:   IM note:   Plan:   1. Sepsis secondary to necrotizing fasciitis s/p right BKA with wound VAC placement: Currently on Vanco cefepime and clindamycin by general surgery. Appropriate bowel regimen.   Wound care consulted. Optimize pain regimen in place. Empiric antibiotics to be continue for now. Tentative plan for return to the OR on Monday for revision of the amputation to close to BKA. 2. Chronic diabetic foot ulcer of the left toe associated with diabetes mellitus: Wound care consult in place. 3. Insulin-dependent diabetes mellitus type 2 on sliding scale insulin with a goal sugar of 140-180   4. Mood disorder continue fluoxetine   5.  Benign essential hypertension      MEDICATIONS:   cefepime (MAXIPIME) 2000 mg IVPB minibag   Freq: EVERY 12 HOURS Route: IV      cefepime (MAXIPIME) 2000 mg IVPB minibag   Freq: EVERY 12 HOURS Route: IV      vancomycin (VANCOCIN) 1,000 mg in dextrose 5 % 250 mL IVPB ([id number])   Freq: EVERY 12 HOURS Route: IV      vancomycin (VANCOCIN) 1,250 mg in dextrose 5 % 250 mL IVPB ([id number])   Freq: EVERY 12 HOURS Route: IV      morphine (PF) injection 2 mg   Freq: EVERY 2 HOURS PRN Route: IV   PRN Reason: Pain Severe (7-10)x2

## 2022-09-08 ENCOUNTER — HOSPITAL ENCOUNTER (INPATIENT)
Age: 42
LOS: 9 days | Discharge: HOME | DRG: 254 | End: 2022-09-17
Attending: PHYSICAL MEDICINE & REHABILITATION | Admitting: PHYSICAL MEDICINE & REHABILITATION
Payer: COMMERCIAL

## 2022-09-08 VITALS
WEIGHT: 181.44 LBS | BODY MASS INDEX: 24.58 KG/M2 | RESPIRATION RATE: 16 BRPM | HEIGHT: 72 IN | TEMPERATURE: 98.3 F | OXYGEN SATURATION: 98 % | SYSTOLIC BLOOD PRESSURE: 161 MMHG | HEART RATE: 78 BPM | DIASTOLIC BLOOD PRESSURE: 85 MMHG

## 2022-09-08 DIAGNOSIS — S88.119A BELOW KNEE AMPUTATION (HCC): Primary | ICD-10-CM

## 2022-09-08 LAB
BASOPHILS ABSOLUTE: 0 K/CU MM
BASOPHILS RELATIVE PERCENT: 0.3 % (ref 0–1)
DIFFERENTIAL TYPE: ABNORMAL
EOSINOPHILS ABSOLUTE: 0.1 K/CU MM
EOSINOPHILS RELATIVE PERCENT: 0.9 % (ref 0–3)
GLUCOSE BLD-MCNC: 101 MG/DL (ref 70–99)
GLUCOSE BLD-MCNC: 116 MG/DL (ref 70–99)
GLUCOSE BLD-MCNC: 196 MG/DL (ref 70–99)
GLUCOSE BLD-MCNC: 206 MG/DL (ref 70–99)
GLUCOSE BLD-MCNC: 228 MG/DL (ref 70–99)
HCT VFR BLD CALC: 36.9 % (ref 42–52)
HEMOGLOBIN: 11.8 GM/DL (ref 13.5–18)
IMMATURE NEUTROPHIL %: 0.9 % (ref 0–0.43)
LYMPHOCYTES ABSOLUTE: 1.3 K/CU MM
LYMPHOCYTES RELATIVE PERCENT: 11.3 % (ref 24–44)
MCH RBC QN AUTO: 29.1 PG (ref 27–31)
MCHC RBC AUTO-ENTMCNC: 32 % (ref 32–36)
MCV RBC AUTO: 91.1 FL (ref 78–100)
MONOCYTES ABSOLUTE: 0.8 K/CU MM
MONOCYTES RELATIVE PERCENT: 6.9 % (ref 0–4)
NUCLEATED RBC %: 0 %
PDW BLD-RTO: 14.8 % (ref 11.7–14.9)
PLATELET # BLD: 723 K/CU MM (ref 140–440)
PMV BLD AUTO: 8.7 FL (ref 7.5–11.1)
RBC # BLD: 4.05 M/CU MM (ref 4.6–6.2)
SARS-COV-2, NAAT: NOT DETECTED
SEGMENTED NEUTROPHILS ABSOLUTE COUNT: 9.3 K/CU MM
SEGMENTED NEUTROPHILS RELATIVE PERCENT: 79.7 % (ref 36–66)
SOURCE: NORMAL
TOTAL IMMATURE NEUTOROPHIL: 0.11 K/CU MM
TOTAL NUCLEATED RBC: 0 K/CU MM
WBC # BLD: 11.6 K/CU MM (ref 4–10.5)

## 2022-09-08 PROCEDURE — 2500000003 HC RX 250 WO HCPCS: Performed by: NURSE PRACTITIONER

## 2022-09-08 PROCEDURE — 94761 N-INVAS EAR/PLS OXIMETRY MLT: CPT

## 2022-09-08 PROCEDURE — 85025 COMPLETE CBC W/AUTO DIFF WBC: CPT

## 2022-09-08 PROCEDURE — 36415 COLL VENOUS BLD VENIPUNCTURE: CPT

## 2022-09-08 PROCEDURE — 97116 GAIT TRAINING THERAPY: CPT

## 2022-09-08 PROCEDURE — 97110 THERAPEUTIC EXERCISES: CPT

## 2022-09-08 PROCEDURE — 82962 GLUCOSE BLOOD TEST: CPT

## 2022-09-08 PROCEDURE — 99024 POSTOP FOLLOW-UP VISIT: CPT | Performed by: NURSE PRACTITIONER

## 2022-09-08 PROCEDURE — 6370000000 HC RX 637 (ALT 250 FOR IP): Performed by: NURSE PRACTITIONER

## 2022-09-08 PROCEDURE — 1200000000 HC SEMI PRIVATE

## 2022-09-08 PROCEDURE — APPNB15 APP NON BILLABLE TIME 0-15 MINS: Performed by: NURSE PRACTITIONER

## 2022-09-08 PROCEDURE — 6360000002 HC RX W HCPCS: Performed by: NURSE PRACTITIONER

## 2022-09-08 PROCEDURE — 87635 SARS-COV-2 COVID-19 AMP PRB: CPT

## 2022-09-08 PROCEDURE — 2580000003 HC RX 258: Performed by: ANESTHESIOLOGY

## 2022-09-08 PROCEDURE — 97542 WHEELCHAIR MNGMENT TRAINING: CPT

## 2022-09-08 PROCEDURE — 2580000003 HC RX 258: Performed by: NURSE PRACTITIONER

## 2022-09-08 PROCEDURE — 97530 THERAPEUTIC ACTIVITIES: CPT

## 2022-09-08 RX ORDER — ONDANSETRON 2 MG/ML
4 INJECTION INTRAMUSCULAR; INTRAVENOUS EVERY 6 HOURS PRN
Status: CANCELLED | OUTPATIENT
Start: 2022-09-08

## 2022-09-08 RX ORDER — DEXTROSE MONOHYDRATE 100 MG/ML
INJECTION, SOLUTION INTRAVENOUS CONTINUOUS PRN
Status: CANCELLED | OUTPATIENT
Start: 2022-09-08

## 2022-09-08 RX ORDER — INSULIN GLARGINE 100 [IU]/ML
30 INJECTION, SOLUTION SUBCUTANEOUS NIGHTLY
Status: CANCELLED | OUTPATIENT
Start: 2022-09-08

## 2022-09-08 RX ORDER — OXYCODONE HYDROCHLORIDE 10 MG/1
10 TABLET ORAL EVERY 4 HOURS PRN
Status: CANCELLED | OUTPATIENT
Start: 2022-09-08

## 2022-09-08 RX ORDER — INSULIN LISPRO 100 [IU]/ML
0-16 INJECTION, SOLUTION INTRAVENOUS; SUBCUTANEOUS
Status: CANCELLED | OUTPATIENT
Start: 2022-09-09

## 2022-09-08 RX ORDER — METOPROLOL SUCCINATE 25 MG/1
25 TABLET, EXTENDED RELEASE ORAL DAILY
Status: CANCELLED | OUTPATIENT
Start: 2022-09-09

## 2022-09-08 RX ORDER — ENOXAPARIN SODIUM 100 MG/ML
40 INJECTION SUBCUTANEOUS DAILY
Status: CANCELLED | OUTPATIENT
Start: 2022-09-09

## 2022-09-08 RX ORDER — MAGNESIUM HYDROXIDE/ALUMINUM HYDROXICE/SIMETHICONE 120; 1200; 1200 MG/30ML; MG/30ML; MG/30ML
30 SUSPENSION ORAL EVERY 6 HOURS PRN
Status: CANCELLED | OUTPATIENT
Start: 2022-09-08

## 2022-09-08 RX ORDER — INSULIN LISPRO 100 [IU]/ML
0-4 INJECTION, SOLUTION INTRAVENOUS; SUBCUTANEOUS NIGHTLY
Status: CANCELLED | OUTPATIENT
Start: 2022-09-08

## 2022-09-08 RX ORDER — PANTOPRAZOLE SODIUM 20 MG/1
20 TABLET, DELAYED RELEASE ORAL
Status: CANCELLED | OUTPATIENT
Start: 2022-09-09

## 2022-09-08 RX ORDER — ATORVASTATIN CALCIUM 40 MG/1
40 TABLET, FILM COATED ORAL NIGHTLY
Status: CANCELLED | OUTPATIENT
Start: 2022-09-08

## 2022-09-08 RX ORDER — FLUOXETINE HYDROCHLORIDE 20 MG/1
80 CAPSULE ORAL DAILY
Status: CANCELLED | OUTPATIENT
Start: 2022-09-09

## 2022-09-08 RX ORDER — OXYCODONE HYDROCHLORIDE 5 MG/1
5 TABLET ORAL EVERY 4 HOURS PRN
Status: CANCELLED | OUTPATIENT
Start: 2022-09-08

## 2022-09-08 RX ORDER — MORPHINE SULFATE 2 MG/ML
2 INJECTION, SOLUTION INTRAMUSCULAR; INTRAVENOUS
Status: CANCELLED | OUTPATIENT
Start: 2022-09-08

## 2022-09-08 RX ORDER — ONDANSETRON 4 MG/1
4 TABLET, ORALLY DISINTEGRATING ORAL EVERY 8 HOURS PRN
Status: CANCELLED | OUTPATIENT
Start: 2022-09-08

## 2022-09-08 RX ORDER — MORPHINE SULFATE 2 MG/ML
1 INJECTION, SOLUTION INTRAMUSCULAR; INTRAVENOUS
Status: CANCELLED | OUTPATIENT
Start: 2022-09-08

## 2022-09-08 RX ORDER — SUCRALFATE 1 G/1
1 TABLET ORAL EVERY 12 HOURS SCHEDULED
Status: CANCELLED | OUTPATIENT
Start: 2022-09-08

## 2022-09-08 RX ORDER — SODIUM CHLORIDE 0.9 % (FLUSH) 0.9 %
5-40 SYRINGE (ML) INJECTION PRN
Status: CANCELLED | OUTPATIENT
Start: 2022-09-08

## 2022-09-08 RX ORDER — DOCUSATE SODIUM 100 MG/1
100 CAPSULE, LIQUID FILLED ORAL DAILY
Status: CANCELLED | OUTPATIENT
Start: 2022-09-09

## 2022-09-08 RX ORDER — LOSARTAN POTASSIUM 25 MG/1
25 TABLET ORAL DAILY
Status: CANCELLED | OUTPATIENT
Start: 2022-09-09

## 2022-09-08 RX ADMIN — SODIUM CHLORIDE, PRESERVATIVE FREE 10 ML: 5 INJECTION INTRAVENOUS at 08:05

## 2022-09-08 RX ADMIN — PANTOPRAZOLE SODIUM 20 MG: 20 TABLET, DELAYED RELEASE ORAL at 07:07

## 2022-09-08 RX ADMIN — ATORVASTATIN CALCIUM 40 MG: 40 TABLET, FILM COATED ORAL at 22:18

## 2022-09-08 RX ADMIN — CLINDAMYCIN IN 5 PERCENT DEXTROSE 900 MG: 18 INJECTION, SOLUTION INTRAVENOUS at 14:02

## 2022-09-08 RX ADMIN — SODIUM CHLORIDE 25 ML: 9 INJECTION, SOLUTION INTRAVENOUS at 08:11

## 2022-09-08 RX ADMIN — SUCRALFATE 1 G: 1 TABLET ORAL at 22:18

## 2022-09-08 RX ADMIN — METOPROLOL SUCCINATE 25 MG: 25 TABLET, EXTENDED RELEASE ORAL at 08:05

## 2022-09-08 RX ADMIN — CLINDAMYCIN IN 5 PERCENT DEXTROSE 900 MG: 18 INJECTION, SOLUTION INTRAVENOUS at 03:56

## 2022-09-08 RX ADMIN — SODIUM CHLORIDE, PRESERVATIVE FREE 10 ML: 5 INJECTION INTRAVENOUS at 22:24

## 2022-09-08 RX ADMIN — SUCRALFATE 1 G: 1 TABLET ORAL at 08:05

## 2022-09-08 RX ADMIN — ENOXAPARIN SODIUM 40 MG: 100 INJECTION SUBCUTANEOUS at 08:05

## 2022-09-08 RX ADMIN — INSULIN LISPRO 4 UNITS: 100 INJECTION, SOLUTION INTRAVENOUS; SUBCUTANEOUS at 17:58

## 2022-09-08 RX ADMIN — INSULIN GLARGINE 30 UNITS: 100 INJECTION, SOLUTION SUBCUTANEOUS at 22:21

## 2022-09-08 RX ADMIN — VANCOMYCIN HYDROCHLORIDE 1250 MG: 1.25 INJECTION, POWDER, LYOPHILIZED, FOR SOLUTION INTRAVENOUS at 22:47

## 2022-09-08 RX ADMIN — VANCOMYCIN HYDROCHLORIDE 1250 MG: 1.25 INJECTION, POWDER, LYOPHILIZED, FOR SOLUTION INTRAVENOUS at 08:13

## 2022-09-08 RX ADMIN — FLUOXETINE 80 MG: 20 CAPSULE ORAL at 08:05

## 2022-09-08 RX ADMIN — MORPHINE SULFATE 2 MG: 2 INJECTION, SOLUTION INTRAMUSCULAR; INTRAVENOUS at 08:05

## 2022-09-08 RX ADMIN — LOSARTAN POTASSIUM 25 MG: 25 TABLET, FILM COATED ORAL at 08:05

## 2022-09-08 RX ADMIN — CEFEPIME HYDROCHLORIDE 2000 MG: 2 INJECTION, POWDER, FOR SOLUTION INTRAMUSCULAR; INTRAVENOUS at 07:04

## 2022-09-08 RX ADMIN — CLINDAMYCIN IN 5 PERCENT DEXTROSE 900 MG: 18 INJECTION, SOLUTION INTRAVENOUS at 22:28

## 2022-09-08 RX ADMIN — SODIUM CHLORIDE: 9 INJECTION, SOLUTION INTRAVENOUS at 22:27

## 2022-09-08 RX ADMIN — DOCUSATE SODIUM 100 MG: 100 CAPSULE, LIQUID FILLED ORAL at 08:05

## 2022-09-08 RX ADMIN — CEFEPIME HYDROCHLORIDE 2000 MG: 2 INJECTION, POWDER, FOR SOLUTION INTRAMUSCULAR; INTRAVENOUS at 22:38

## 2022-09-08 ASSESSMENT — PAIN - FUNCTIONAL ASSESSMENT: PAIN_FUNCTIONAL_ASSESSMENT: PREVENTS OR INTERFERES SOME ACTIVE ACTIVITIES AND ADLS

## 2022-09-08 ASSESSMENT — PAIN DESCRIPTION - DESCRIPTORS
DESCRIPTORS: ACHING
DESCRIPTORS: ACHING

## 2022-09-08 ASSESSMENT — PAIN DESCRIPTION - ORIENTATION
ORIENTATION: RIGHT
ORIENTATION: RIGHT

## 2022-09-08 ASSESSMENT — PAIN DESCRIPTION - LOCATION
LOCATION: LEG
LOCATION: LEG

## 2022-09-08 ASSESSMENT — PAIN SCALES - GENERAL
PAINLEVEL_OUTOF10: 7
PAINLEVEL_OUTOF10: 6

## 2022-09-08 NOTE — CARE COORDINATION
Received PS from hospitalist that d/t being a consult unable to discharge patient. Contacted surgeons PA for discharge readmit orders. Awaiting response. Notified 4N charge nurse of above and requesting assistance on finding a MD to put d/c and readmit orders in. Notified ARU charge nurse of above. Patient is approved and ready to come from ARU standpoint. COVID negative test noted. 1725:  Per surgeon PA surgical team was consulted. Notified hospitalist of information.

## 2022-09-08 NOTE — PROGRESS NOTES
GENERAL SURGERY INPATIENT POST-OP NOTE  [facility]) Physicians    PATIENT: Madan Ho, 39 y.o., male, MRN: [id number]    Hospital Day:  LOS: 5 days , Post-Op Day: 6 days post op right leg guillotine BKA and POD #3 for revision from guillotine to closed BKA. Procedure(s): 22 RIGHT LEG AMPUTATION BELOW KNEE    Madan Ho is a 39 y.o. male who presented with history of diabetic foot ulcers, recent hospital discharge for pneumatosis coli, now presenting with acute worsening of his right foot wound concerning for necrotizing fasciitis             Subjective:  Chief Complaint: better today, some pain with brace  Pain: 0/10  BM:  yes  Diet: ADULT DIET; Regular; 4 carb choices (60 gm/meal)  Activity: with assistance    Stable overnight. Working with PT       Objective:    Vitals: BP (!) 159/92   Pulse 73   Temp 98.4 °F (36.9 °C) (Oral)   Resp 18   Ht 6' (1.829 m)   Wt 181 lb 7 oz (82.3 kg)   SpO2 95%   BMI 24.61 kg/m²   Vital Signs (Last 24 Hours)  Temp  Av.5 °F (36.9 °C)  Min: 98.1 °F (36.7 °C)  Max: 99 °F (37.2 °C)  Pulse  Av.5  Min: 69  Max: 79  BP  Min: 145/91  Max: 159/92  Resp  Av.9  Min: 16  Max: 20  SpO2  Av.7 %  Min: 95 %  Max: 98 %  Wt Readings from Last 3 Encounters:   22 181 lb 7 oz (82.3 kg)   22 184 lb 11.9 oz (83.8 kg)   22 180 lb (81.6 kg)       I/O:  1901 -  0700  In: 5405.8 [P.O.:120;  I.V.:4664.9]  Out: 200 [Urine:200]    IV Fluids:   lactated ringers Last Rate: 125 mL/hr at 22 0238    sodium chloride Last Rate: 25 mL (22 0811)    dextrose    sodium chloride Last Rate: 100 mL/hr at 22 0442    dextrose    Scheduled Meds:   sodium chloride flush, 5-40 mL, IntraVENous, 2 times per day    meperidine, 12.5 mg, IntraVENous, Once    enoxaparin, 40 mg, SubCUTAneous, Daily    vancomycin, 1,250 mg, IntraVENous, Q12H    sodium chloride flush, 5-40 mL, IntraVENous, 2 times per day    cefepime, 2,000 mg, IntraVENous, Q12H clindamycin (CLEOCIN) IV, 900 mg, IntraVENous, Q8H    docusate sodium, 100 mg, Oral, Daily    atorvastatin, 40 mg, Oral, Nightly    FLUoxetine, 80 mg, Oral, Daily    losartan, 25 mg, Oral, Daily    metoprolol succinate, 25 mg, Oral, Daily    pantoprazole, 20 mg, Oral, QAM AC    sucralfate, 1 g, Oral, 2 times per day    insulin glargine, 30 Units, SubCUTAneous, Nightly    insulin lispro, 0-16 Units, SubCUTAneous, TID WC    insulin lispro, 0-4 Units, SubCUTAneous, Nightly    Physical Exam:  General Appearance:   Alert, cooperative, no distress    Head:   Normocephalic, atraumatic    Lungs:    Equal chest rise, respirations unlabored   Heart:   Regular rate and rhythm    Abdomen:    Soft, non-tender, no rebound or guarding    Extremities:  No cyanosis, dressing intact; no blood noted on dressing.   Knee immobilizer on  Left foot with chronic ulcers of the medial foot and plantar foot, stable   Neurologic:  Nonfocal, grossly intact, peripheral neuropathy        Labs/Imaging Results:   Recent Results (from the past 24 hour(s))   POCT Glucose    Collection Time: 09/07/22 12:31 PM   Result Value Ref Range    POC Glucose 233 (H) 70 - 99 MG/DL   POCT Glucose    Collection Time: 09/07/22  5:28 PM   Result Value Ref Range    POC Glucose 152 (H) 70 - 99 MG/DL   POCT Glucose    Collection Time: 09/07/22  7:39 PM   Result Value Ref Range    POC Glucose 156 (H) 70 - 99 MG/DL   POCT Glucose    Collection Time: 09/08/22  7:59 AM   Result Value Ref Range    POC Glucose 116 (H) 70 - 99 MG/DL         Assessment:  Brant Roberson is a 39 y.o. male who is post-op day #3 Days Post-Op 9/2/22 RIGHT LEG AMPUTATION BELOW KNEE      Principal Problem:    Necrotizing fasciitis ([address]  Active Problems:    Diabetic ulcer of toe of left foot associated with type 2 diabetes mellitus, with fat layer exposed ([address]    Sepsis ([address]    Pneumatosis coli    Type 2 diabetes mellitus with right diabetic foot infection ([address]    Necrotizing fasciitis of lower leg ([facility] [address] [facility]    Type 2 diabetes mellitus, with long-term current use of insulin ([facility] [address]  Resolved Problems:    * No resolved hospital problems. *    Plan:  Discussed findings and options with Venus Valentin. Amputation site with dressing intact and knee immobilizer on. Dressing changed today. Staples intact. Wound care - cleanse with NS, apply xeroform to staple line, cover with fluffed gauze and ABD.   Wrap with kerlix and ACE wrap daily  Continue empiric abx for now  PT/OT   Tiff visiting today  ARU pending    Electronically signed: RAFAEL Baetty CNP 9/8/2022 10:36 AM

## 2022-09-08 NOTE — PROGRESS NOTES
[facility]  consulted by KAMI Suh for monitoring and adjustment. Indication for treatment: Necrotizing fasciitis POD1 guillotine BKA  Goal trough: [x] 10-15 mcg/mL or [] 15-20 mcg/ml  AUC/LIDIA: [x] <500 or [] 400-600    Pertinent Laboratory Values:   Temp Readings from Last 3 Encounters:   09/08/22 98.4 °F (36.9 °C) (Oral)   09/02/22 98.6 °F (37 °C) (Oral)   08/30/22 99 °F (37.2 °C) (Temporal)     Recent Labs     09/06/22  0833 09/07/22  0730 09/08/22  1107   WBC 13.6* 11.2* 11.6*       Recent Labs     09/06/22  0833   BUN 7   CREATININE 0.6*       Estimated Creatinine Clearance: 178 mL/min (A) (based on SCr of 0.6 mg/dL (L)). Intake/Output Summary (Last 24 hours) at 9/8/2022 1342  Last data filed at 9/7/2022 1809  Gross per 24 hour   Intake --   Output 200 ml   Net -200 ml         Pertinent Cultures:  Date    Source    Results  9/02   Blood x 2   NGTD      Vancomycin level:   RANDOM:    Recent Labs     09/06/22  0833   VANCORANDOM 18.8       Assessment:  Renal trends have been close to baseline  S/p right leg guillotine BKA  Day(s) of therapy:5 of 7 (last dose on 9/9)  Vancomycin level: 10.3 (12 hours post dose)   with ssTrough 11  18.8 ()    Plan:  Renal trends have been close to baseline  Continue with vancomycin 1250mg ivpb q12h with a predicted therapeutic trough of 10.4 at steady state  Recheck the vanco level and SCR tomorrow am  Pharmacy will continue to monitor patient and adjust therapy as indicated    VANCOMYCIN CONCENTRATION SCHEDULED FOR 9/9 @0600    Thank you for the consult. Ricky Khan [facility]

## 2022-09-08 NOTE — PROGRESS NOTES
Sharla Carmona  called this nurse about patient being discharged to ARU. They are ready for patient. She spoke with hospitalist who informed her that patient was just a consult and they could not discharge patient. This nurse then called Jami An, Nursing Supervisor and she then referred this information to Neetu Tijerina RN from [facility] and she will take care of it, but for now patient is to remain on 4N.

## 2022-09-08 NOTE — PROGRESS NOTES
V2.0  [facility] [place]      Name:  Guanako Wilkins /Age/Sex: 1980  (39 y.o. male)   MRN & CSN:  [id number] & [id number] Encounter Date/Time: 2022 7:42 AM EDT    Location:  [address] [place] [address] [facility]    Assessment and Plan:   Guanako Wilkins is a 39 y.o. male with pmh of HTN, T2Dm with long term insulin use and recent admission and discharge for pneumatosis of the cecum and ascending colon who presents with Necrotizing fasciitis ([facility] [address] He is s/p guillotine BKA and revision with closure. He was awaiting acceptance to ARU, bed confirmed available in the AM.      Plan:  #Sepsis 2/2 necrotizing fascitis s/p right BKA  --Patient is s/p BKA revision and closure, with bandage in place  --Continue on Vanc, cefepime and clinda, will plan to de-escalate given source control  --Follow up surgery culture, wound care on board  --patient is accepted to ARU and will have a bed tomorrow  --  #Chronic diabetic foot ulcer of the left toe a/w DM  --Wound care on board, dressing in place C/D/I    #IDDM  --Currently on lantus 30units nightly and SSI  --Will consider adding bolus to maintain sugars <180, will monitor sugars overnight --Continue to monitor accucheks and work to maintain tight glycemic control for healing purposes    #Mood disorder  --Continue Fluoxetine    #HTN  --History of HTN, home regimen restarted. Continue to monitor and adjust as appropriate    Diet ADULT DIET;  Regular; 4 carb choices (60 gm/meal)   DVT Prophylaxis [x] Lovenox, []  Heparin, [] SCDs, [] Ambulation,  [] Eliquis, [] Xarelto  [] Coumadin   Code Status Full Code   Disposition From: home  Expected Disposition: ARU  Estimated Date of Discharge: tomorrow  Patient requires continued admission due to right BKA in the setting of necrotizing fascitis and placement at ARU   Surrogate Decision Maker/ POA      Subjective:     Chief Complaint: Wound Check (Bilateral foot ulcers) and Emesis Letty Strong is a 39 y.o. male seen s/p revision and closure of BKA I the setting of severe sepsis d/t necrotizing fascitis. He was seen at bedside and endorses an improvement in his pain. He reports success with bowel movement. Review of Systems:    Review of Systems  Constitutional:  Negative for chills and fever. HENT:  Negative for ear pain, hearing loss, sinus pressure, sinus pain and voice change. Eyes:  Negative for pain, discharge and visual disturbance. Respiratory:  Negative for cough, chest tightness and shortness of breath. Cardiovascular:  Negative for chest pain and palpitations. Gastrointestinal:  Positive for decreased appetite Negative for abdominal pain, blood in stool, diarrhea, nausea and vomiting. Genitourinary:  Negative for dysuria and frequency. Musculoskeletal: Positive for less pain s/p BKA in RLE and ulcer on LLE  Neurological:  Negative for dizziness, light-headedness and headaches. Psychiatric/Behavioral:  Negative for sleep disturbance. Objective: Intake/Output Summary (Last 24 hours) at 9/8/2022 0748  Last data filed at 9/7/2022 1809  Gross per 24 hour   Intake --   Output 200 ml   Net -200 ml          Vitals:   Vitals:    09/08/22 0306   BP: (!) 155/85   Pulse: 69   Resp: 20   Temp: 99 °F (37.2 °C)   SpO2: 95%       Physical Exam:   General: NAD  Eyes: EOMI  HENT: NCAT, mucous membranes moist  Cardiovascular: Regular rate and rhythm, no murmurs auscultated. Respiratory: Clear to auscultation  Gastrointestinal: Soft, non tender  Genitourinary: no suprapubic tenderness  Musculoskeletal: Ace wrap on right BKA, dressing to LLE C/D/I  Skin: warm, dry  Neuro: Alert. Psych: Mood appropriate.      Medications:   Medications:    sodium chloride flush  5-40 mL IntraVENous 2 times per day    meperidine  12.5 mg IntraVENous Once    enoxaparin  40 mg SubCUTAneous Daily    vancomycin  1,250 mg IntraVENous Q12H    sodium chloride flush  5-40 mL IntraVENous 2 times per day    cefepime  2,000 mg IntraVENous Q12H    clindamycin (CLEOCIN) IV  900 mg IntraVENous Q8H    docusate sodium  100 mg Oral Daily    atorvastatin  40 mg Oral Nightly    FLUoxetine  80 mg Oral Daily    losartan  25 mg Oral Daily    metoprolol succinate  25 mg Oral Daily    pantoprazole  20 mg Oral QAM AC    sucralfate  1 g Oral 2 times per day    insulin glargine  30 Units SubCUTAneous Nightly    insulin lispro  0-16 Units SubCUTAneous TID WC    insulin lispro  0-4 Units SubCUTAneous Nightly      Infusions:    lactated ringers 125 mL/hr at 09/07/22 0238    sodium chloride      dextrose      sodium chloride 100 mL/hr at 09/07/22 0442    dextrose       PRN Meds: sodium chloride flush, 5-40 mL, PRN  sodium chloride, 25 mL, PRN  HYDROmorphone, 0.25 mg, Q5 Min PRN  fentanNYL, 50 mcg, Q5 Min PRN  hydrALAZINE, 10 mg, Q15 Min PRN  glucose, 4 tablet, PRN  dextrose bolus, 125 mL, PRN   Or  dextrose bolus, 250 mL, PRN  glucagon (rDNA), 1 mg, PRN  dextrose, , Continuous PRN  HYDROmorphone, 0.5 mg, Q3H PRN  sodium chloride flush, 5-40 mL, PRN  sodium chloride, , PRN  ondansetron, 4 mg, Q8H PRN   Or  ondansetron, 4 mg, Q6H PRN  oxyCODONE, 5 mg, Q4H PRN   Or  oxyCODONE, 10 mg, Q4H PRN  morphine, 1 mg, Q2H PRN   Or  morphine, 2 mg, Q2H PRN  glucose, 4 tablet, PRN  dextrose bolus, 125 mL, PRN   Or  dextrose bolus, 250 mL, PRN  glucagon (rDNA), 1 mg, PRN  dextrose, , Continuous PRN  aluminum & magnesium hydroxide-simethicone, 30 mL, Q6H PRN      Labs      Recent Results (from the past 24 hour(s))   POCT Glucose    Collection Time: 09/07/22  7:52 AM   Result Value Ref Range    POC Glucose 154 (H) 70 - 99 MG/DL   POCT Glucose    Collection Time: 09/07/22 12:31 PM   Result Value Ref Range    POC Glucose 233 (H) 70 - 99 MG/DL   POCT Glucose    Collection Time: 09/07/22  5:28 PM   Result Value Ref Range    POC Glucose 152 (H) 70 - 99 MG/DL   POCT Glucose    Collection Time: 09/07/22  7:39 PM   Result Value Ref Range    POC Glucose 156 (H) 70 - 99 MG/DL        Imaging/Diagnostics Last 24 Hours   No results found.     Electronically signed by Laurie Lazar MD on 9/8/2022 at 7:48 AM

## 2022-09-08 NOTE — CARE COORDINATION
Received call from [place] from [address] [place] [address] that she spoke with YANETH at [place] and pt was approved to admit to ARU. TC to Lorie with ARU- verified pt has been approved however they will not have bed available until tomorrow. Sent PS to Dr. Shiraz Paredes to update. Spoke with Lorie and bed is available today- updated Dr. Shiraz Paredes, she will work on discharge- readmit orders to transfer pt today.

## 2022-09-08 NOTE — CARE COORDINATION
Received approval for admission to ARU. Eliel Flor #[id number]. Notified patient of approval.  Per patient his goal is to return home alone at discharge from ARU. Patient his understanding of ARU criteria and expresses his agreement. Notified MD of approval and that bed is available for patient today on ARU. Requested for rapid COVID test, d/c, and readmit orders to be placed. Patient meets criteria and is approved to come to ARU. Patient able to admit once medically stable and after ARU Medical Director and  sign the pre-admission screen (PAS), pending rapid COVID results. Temples.../Clavicles.../Shoulders.../Thigh.../Calf.../Dorsal hand... No

## 2022-09-08 NOTE — PROGRESS NOTES
Physical Therapy  Name: Turner Monday MRN: [id number] :   1980   Date:  2022   Admission Date: 2022 Room:  [address] [place] [address]   Restrictions/Precautions:  Restrictions/Precautions  Restrictions/Precautions: General Precautions, Fall Risk      R BKA in immobilizer  Communication with other providers:  Radha GUZMAN states pt is ok to see for therapy  Subjective:  Patient states:  he is not scared of going home he is concerned about getting around the house with a w/c and learning to be Independent with the amputation  Pain:   Location, Type, Intensity (0/10 to 10/10):  0/10  Objective:    Observation:  pt was in the bed  Treatment, including education/measures:  Transfers   Rolling: Ind  Supine to sit :CGA d/t off balance initially  Scooting :SBA  Sit to stand :CGA and VC's for hand placements  Stand to sit :CGA and VC's for hand placements  Gait:  Pt amb with RW for 10 ft x 2 with CGA to min A of 1 for balance  Gait Comments: with VC's' and TC's for L LE placement, walker placement and sequence throughout ambulation; with VC's and TC's to maintain upright posture in order to avoid COM shifting outside of MELANIE; with VC's for PLB throughout ambulation  Pt was slightly diaphoretic today with gt. Cool Wash cloth given  Pt did w/c mobility with R and L turns in the hallway with VC's and supervision. Did not have a w/c with elevated rest for pt to be instructed in w/cmanagement  Sitting Exercises: Ankle pumps x 10 on L   Glute sets x 10  LAQ's x 10  Marching x 10   Hip Abd x 10  resistive exercises c Green Theraband x10 reps each UE x4 sets. For UE's  Therapeutic Exercise:  Therapeutic exercises were instructed today. Cues were given for technique, safety, recruitment, and rationale. Cues were verbal and/or tactile. Safety  Patient left safely in the recliner, with call light/phone in reach with alarm applied. Gait belt and mask were used for transfers and gait.   Assessment / Impression:     Patient's tolerance of treatment:  fair to good, tolerated 10 ft well with minimal symptoms today  Adverse Reaction: slightly diaphoretic with gt  Significant change in status and impact:  none  Barriers to improvement:  diaphoretic with gt, weakness, endurence,balance, safety  Plan for Next Session:    Will cont to work towards pt's goals per his tolerance  Time in:  1045  Time out:  1138  Timed treatment minutes:  53  Total treatment time:  48  Previously filed items:  Social/Functional History  Lives With: Alone  Type of Home: House  Home Layout: Two level, Able to Live on Main level with bedroom/bathroom  Home Access: Ramped entrance  Bathroom Shower/Tub: Tub/Shower unit  Bathroom Toilet: Standard  Bathroom Equipment: Tub transfer bench  Home Equipment: melissa Mary [employer]  Has the patient had two or more falls in the past year or any fall with injury in the past year?: Yes (3-4)  ADL Assistance: Independent  Homemaking Assistance: Independent  Ambulation Assistance: Independent  Transfer Assistance: Independent ([address] vs RW)  Active : Yes  Occupation: On disability     Long Term Goals  Time Frame for Long term goals : In one week:  Long term goal 1: Pt will complete all bed mobility with supervision  Long term goal 2: Pt will complete sit <> stand transfers with SBAx1  Long term goal 3: Pt will complete bed <> chair transfers with SBAx1  Long term goal 4: Pt will propel manual w/c 500 feet with supervision  Long term goal 5: Pt will independently complete 3 sets of 10 reps of BLE AROM exercises in available and allowed ROM  Electronically signed by:     Gina Estrada PTA  9/8/2022, 11:22 AM

## 2022-09-09 ENCOUNTER — CARE COORDINATION (OUTPATIENT)
Dept: CARE COORDINATION | Age: 42
End: 2022-09-09

## 2022-09-09 LAB
GLUCOSE BLD-MCNC: 131 MG/DL (ref 70–99)
GLUCOSE BLD-MCNC: 136 MG/DL (ref 70–99)
GLUCOSE BLD-MCNC: 149 MG/DL (ref 70–99)
GLUCOSE BLD-MCNC: 180 MG/DL (ref 70–99)

## 2022-09-09 PROCEDURE — 97116 GAIT TRAINING THERAPY: CPT

## 2022-09-09 PROCEDURE — 2580000003 HC RX 258: Performed by: PHYSICAL MEDICINE & REHABILITATION

## 2022-09-09 PROCEDURE — 6360000002 HC RX W HCPCS: Performed by: PHYSICAL MEDICINE & REHABILITATION

## 2022-09-09 PROCEDURE — 82962 GLUCOSE BLOOD TEST: CPT

## 2022-09-09 PROCEDURE — 1280000000 HC REHAB R&B

## 2022-09-09 PROCEDURE — 97163 PT EVAL HIGH COMPLEX 45 MIN: CPT

## 2022-09-09 PROCEDURE — 97530 THERAPEUTIC ACTIVITIES: CPT

## 2022-09-09 PROCEDURE — 6370000000 HC RX 637 (ALT 250 FOR IP): Performed by: PHYSICAL MEDICINE & REHABILITATION

## 2022-09-09 PROCEDURE — 6370000000 HC RX 637 (ALT 250 FOR IP): Performed by: STUDENT IN AN ORGANIZED HEALTH CARE EDUCATION/TRAINING PROGRAM

## 2022-09-09 PROCEDURE — 99223 1ST HOSP IP/OBS HIGH 75: CPT | Performed by: PHYSICAL MEDICINE & REHABILITATION

## 2022-09-09 PROCEDURE — 97535 SELF CARE MNGMENT TRAINING: CPT

## 2022-09-09 PROCEDURE — 97542 WHEELCHAIR MNGMENT TRAINING: CPT

## 2022-09-09 PROCEDURE — 2500000003 HC RX 250 WO HCPCS: Performed by: PHYSICAL MEDICINE & REHABILITATION

## 2022-09-09 PROCEDURE — 97166 OT EVAL MOD COMPLEX 45 MIN: CPT

## 2022-09-09 RX ORDER — LOSARTAN POTASSIUM 25 MG/1
25 TABLET ORAL DAILY
Status: DISCONTINUED | OUTPATIENT
Start: 2022-09-09 | End: 2022-09-17 | Stop reason: HOSPADM

## 2022-09-09 RX ORDER — INSULIN LISPRO 100 [IU]/ML
0-16 INJECTION, SOLUTION INTRAVENOUS; SUBCUTANEOUS
Status: DISCONTINUED | OUTPATIENT
Start: 2022-09-09 | End: 2022-09-17 | Stop reason: HOSPADM

## 2022-09-09 RX ORDER — INSULIN LISPRO 100 [IU]/ML
0-4 INJECTION, SOLUTION INTRAVENOUS; SUBCUTANEOUS NIGHTLY
Status: DISCONTINUED | OUTPATIENT
Start: 2022-09-09 | End: 2022-09-17 | Stop reason: HOSPADM

## 2022-09-09 RX ORDER — FLUOXETINE HYDROCHLORIDE 20 MG/1
80 CAPSULE ORAL DAILY
Status: DISCONTINUED | OUTPATIENT
Start: 2022-09-09 | End: 2022-09-17 | Stop reason: HOSPADM

## 2022-09-09 RX ORDER — SODIUM CHLORIDE 0.9 % (FLUSH) 0.9 %
5-40 SYRINGE (ML) INJECTION PRN
Status: DISCONTINUED | OUTPATIENT
Start: 2022-09-09 | End: 2022-09-17 | Stop reason: HOSPADM

## 2022-09-09 RX ORDER — ATORVASTATIN CALCIUM 40 MG/1
40 TABLET, FILM COATED ORAL NIGHTLY
Status: DISCONTINUED | OUTPATIENT
Start: 2022-09-09 | End: 2022-09-17 | Stop reason: HOSPADM

## 2022-09-09 RX ORDER — MORPHINE SULFATE 4 MG/ML
1 INJECTION, SOLUTION INTRAMUSCULAR; INTRAVENOUS
Status: DISCONTINUED | OUTPATIENT
Start: 2022-09-09 | End: 2022-09-12

## 2022-09-09 RX ORDER — DEXTROSE MONOHYDRATE 100 MG/ML
INJECTION, SOLUTION INTRAVENOUS CONTINUOUS PRN
Status: DISCONTINUED | OUTPATIENT
Start: 2022-09-09 | End: 2022-09-17 | Stop reason: HOSPADM

## 2022-09-09 RX ORDER — OXYCODONE HYDROCHLORIDE 5 MG/1
5 TABLET ORAL EVERY 4 HOURS PRN
Status: DISCONTINUED | OUTPATIENT
Start: 2022-09-09 | End: 2022-09-16

## 2022-09-09 RX ORDER — MORPHINE SULFATE 4 MG/ML
2 INJECTION, SOLUTION INTRAMUSCULAR; INTRAVENOUS
Status: DISCONTINUED | OUTPATIENT
Start: 2022-09-09 | End: 2022-09-12

## 2022-09-09 RX ORDER — SUCRALFATE 1 G/1
1 TABLET ORAL EVERY 12 HOURS SCHEDULED
Status: DISCONTINUED | OUTPATIENT
Start: 2022-09-09 | End: 2022-09-17 | Stop reason: HOSPADM

## 2022-09-09 RX ORDER — ONDANSETRON 2 MG/ML
4 INJECTION INTRAMUSCULAR; INTRAVENOUS EVERY 6 HOURS PRN
Status: DISCONTINUED | OUTPATIENT
Start: 2022-09-09 | End: 2022-09-17 | Stop reason: HOSPADM

## 2022-09-09 RX ORDER — POLYETHYLENE GLYCOL 3350 17 G/17G
17 POWDER, FOR SOLUTION ORAL DAILY PRN
Status: DISCONTINUED | OUTPATIENT
Start: 2022-09-09 | End: 2022-09-17 | Stop reason: HOSPADM

## 2022-09-09 RX ORDER — METOPROLOL SUCCINATE 25 MG/1
25 TABLET, EXTENDED RELEASE ORAL DAILY
Status: DISCONTINUED | OUTPATIENT
Start: 2022-09-09 | End: 2022-09-17 | Stop reason: HOSPADM

## 2022-09-09 RX ORDER — INSULIN GLARGINE 100 [IU]/ML
30 INJECTION, SOLUTION SUBCUTANEOUS NIGHTLY
Status: DISCONTINUED | OUTPATIENT
Start: 2022-09-09 | End: 2022-09-17 | Stop reason: HOSPADM

## 2022-09-09 RX ORDER — OXYCODONE HYDROCHLORIDE 10 MG/1
10 TABLET ORAL EVERY 4 HOURS PRN
Status: DISCONTINUED | OUTPATIENT
Start: 2022-09-09 | End: 2022-09-16

## 2022-09-09 RX ORDER — MAGNESIUM HYDROXIDE/ALUMINUM HYDROXICE/SIMETHICONE 120; 1200; 1200 MG/30ML; MG/30ML; MG/30ML
30 SUSPENSION ORAL EVERY 6 HOURS PRN
Status: DISCONTINUED | OUTPATIENT
Start: 2022-09-09 | End: 2022-09-17 | Stop reason: HOSPADM

## 2022-09-09 RX ORDER — DOCUSATE SODIUM 100 MG/1
100 CAPSULE, LIQUID FILLED ORAL DAILY
Status: DISCONTINUED | OUTPATIENT
Start: 2022-09-09 | End: 2022-09-17 | Stop reason: HOSPADM

## 2022-09-09 RX ORDER — LOPERAMIDE HYDROCHLORIDE 2 MG/1
2 CAPSULE ORAL 4 TIMES DAILY PRN
Status: DISCONTINUED | OUTPATIENT
Start: 2022-09-09 | End: 2022-09-17 | Stop reason: HOSPADM

## 2022-09-09 RX ORDER — CLINDAMYCIN PHOSPHATE 900 MG/50ML
900 INJECTION INTRAVENOUS EVERY 8 HOURS
Status: DISCONTINUED | OUTPATIENT
Start: 2022-09-09 | End: 2022-09-12

## 2022-09-09 RX ORDER — PANTOPRAZOLE SODIUM 20 MG/1
20 TABLET, DELAYED RELEASE ORAL
Status: DISCONTINUED | OUTPATIENT
Start: 2022-09-09 | End: 2022-09-17 | Stop reason: HOSPADM

## 2022-09-09 RX ORDER — HYDROXYZINE HYDROCHLORIDE 10 MG/1
10 TABLET, FILM COATED ORAL 3 TIMES DAILY PRN
Status: DISCONTINUED | OUTPATIENT
Start: 2022-09-09 | End: 2022-09-17 | Stop reason: HOSPADM

## 2022-09-09 RX ORDER — DEXTROSE MONOHYDRATE 100 MG/ML
INJECTION, SOLUTION INTRAVENOUS CONTINUOUS PRN
Status: DISCONTINUED | OUTPATIENT
Start: 2022-09-09 | End: 2022-09-09

## 2022-09-09 RX ORDER — ONDANSETRON 4 MG/1
4 TABLET, ORALLY DISINTEGRATING ORAL EVERY 8 HOURS PRN
Status: DISCONTINUED | OUTPATIENT
Start: 2022-09-09 | End: 2022-09-17 | Stop reason: HOSPADM

## 2022-09-09 RX ORDER — ENOXAPARIN SODIUM 100 MG/ML
40 INJECTION SUBCUTANEOUS DAILY
Status: DISCONTINUED | OUTPATIENT
Start: 2022-09-09 | End: 2022-09-16

## 2022-09-09 RX ADMIN — INSULIN GLARGINE 30 UNITS: 100 INJECTION, SOLUTION SUBCUTANEOUS at 21:57

## 2022-09-09 RX ADMIN — OXYCODONE HYDROCHLORIDE 5 MG: 5 TABLET ORAL at 23:08

## 2022-09-09 RX ADMIN — OXYCODONE HYDROCHLORIDE 5 MG: 5 TABLET ORAL at 08:04

## 2022-09-09 RX ADMIN — CEFEPIME HYDROCHLORIDE 2000 MG: 2 INJECTION, POWDER, FOR SOLUTION INTRAVENOUS at 19:17

## 2022-09-09 RX ADMIN — METOPROLOL SUCCINATE 25 MG: 25 TABLET, EXTENDED RELEASE ORAL at 08:06

## 2022-09-09 RX ADMIN — LOPERAMIDE HYDROCHLORIDE 2 MG: 2 CAPSULE ORAL at 02:21

## 2022-09-09 RX ADMIN — ATORVASTATIN CALCIUM 40 MG: 40 TABLET, FILM COATED ORAL at 21:56

## 2022-09-09 RX ADMIN — CLINDAMYCIN IN 5 PERCENT DEXTROSE 900 MG: 18 INJECTION, SOLUTION INTRAVENOUS at 19:09

## 2022-09-09 RX ADMIN — SUCRALFATE 1 G: 1 TABLET ORAL at 21:55

## 2022-09-09 RX ADMIN — SUCRALFATE 1 G: 1 TABLET ORAL at 08:04

## 2022-09-09 RX ADMIN — FLUOXETINE 80 MG: 20 CAPSULE ORAL at 08:04

## 2022-09-09 RX ADMIN — HYDROXYZINE HYDROCHLORIDE 10 MG: 10 TABLET ORAL at 02:23

## 2022-09-09 RX ADMIN — LOSARTAN POTASSIUM 25 MG: 25 TABLET, FILM COATED ORAL at 08:04

## 2022-09-09 RX ADMIN — OXYCODONE HYDROCHLORIDE 5 MG: 5 TABLET ORAL at 02:21

## 2022-09-09 RX ADMIN — PANTOPRAZOLE SODIUM 20 MG: 20 TABLET, DELAYED RELEASE ORAL at 06:25

## 2022-09-09 ASSESSMENT — PAIN SCALES - WONG BAKER: WONGBAKER_NUMERICALRESPONSE: 0

## 2022-09-09 ASSESSMENT — PAIN DESCRIPTION - ORIENTATION
ORIENTATION: RIGHT;LEFT
ORIENTATION: RIGHT

## 2022-09-09 ASSESSMENT — PAIN SCALES - GENERAL
PAINLEVEL_OUTOF10: 5
PAINLEVEL_OUTOF10: 1
PAINLEVEL_OUTOF10: 1

## 2022-09-09 ASSESSMENT — PAIN - FUNCTIONAL ASSESSMENT
PAIN_FUNCTIONAL_ASSESSMENT: PREVENTS OR INTERFERES SOME ACTIVE ACTIVITIES AND ADLS
PAIN_FUNCTIONAL_ASSESSMENT: ACTIVITIES ARE NOT PREVENTED

## 2022-09-09 ASSESSMENT — PAIN DESCRIPTION - DESCRIPTORS
DESCRIPTORS: ACHING;DISCOMFORT
DESCRIPTORS: ACHING

## 2022-09-09 ASSESSMENT — PAIN DESCRIPTION - LOCATION
LOCATION: LEG
LOCATION: LEG

## 2022-09-09 NOTE — CARE COORDINATION
Case Management Admission Note    Patient:Julien Lozano      :1980  BEJ:[id number]  Rehab Dx/Hx: Necrotizing fasciitis [M72.6]  Necrotizing fasciitis [facility]) [M72.6]    Chief Complaint:   Past Medical History:   Diagnosis Date    Abscess of left foot 2022    Anemia associated with acute blood loss 2022    Callus of foot     Right foot - see's Dr. Macario Milan    Cellulitis of left foot 2022    Diabetic ulcer of left midfoot associated with type 2 diabetes mellitus, with muscle involvement without evidence of necrosis ([address] 2022    Diabetic ulcer of left midfoot associated with type 2 diabetes mellitus, with necrosis of muscle ([address] 2021    Diabetic ulcer of right midfoot associated with type 2 diabetes mellitus, with fat layer exposed ([address] 2020    Dizziness     positional    Essential hypertension     Follows with PCP    Hyperlipidemia     Lumbar radiculopathy     Septic embolism ([address] 2020    Subacute osteomyelitis of left foot ([address] 2022     Past Surgical History:   Procedure Laterality Date    ACHILLES TENDON SURGERY Right 2021    RIGHT ACHILLES TENDON LENGTHENING REPAIR performed by Marina Gould DPM at [facility]  2018    [facility]    DENTAL SURGERY      teeth extractions -half per patient    HERNIA REPAIR Bilateral 2020    BIALTERAL HERNIA INGUINAL REPAIR performed by Briana Yi MD at [address] Right 2022    LEG AMPUTATION BELOW KNEE performed by Babar Bradley MD at [address] [facility] Right 2022    LEG AMPUTATION BELOW KNEE REVISION performed by Babar Bradley MD at [facility] 2018    DE OFFICE/OUTPT VISIT,PROCEDURE ONLY Left 2018    L5-S1 HEMILAMINECTOMY, REMOVAL OF DISC LEFT SIDE performed by Samantha Eden MD at [address] [place] [address] Right 2021    RIGHT TRANSMETATARSAL TOE AMPUTATION performed by Ashley Ortega Macario Milan DPM at [address] Left 4/23/2022    LEFT RAY GREAT TOE AMPUTATION performed by Luci Blankenship MD at [address] [place] [address],[address] EXTRACTION       No Known Allergies  Precautions: falls    Date of Admit: 9/8/2022  Room #: 4104/3735-M    Current functional status at time of admit:  Home Living/DME Available:  Type of Home: House 1 level  Home Access: Ramped entrance  Bathroom Shower/Tub: Tub/Shower unit (TTB)  Bathroom Toilet: Standard  Bathroom Equipment: Tub transfer bench  Home Equipment: Deborah Sanon, rolling, Wheelchair-manual, Cane    IADL Hx:  Homemaking Responsibilities: Yes  Active : Yes  Mode of Transportation: Car  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 LSW met with patient for admission assessment. Patient lives alone in a 1-level home in [place]. There is ramped entrance and no steps inside. Patient has PCP, was independent in ADLs PTA, and uses [facility] [address] in [place] for prescriptions. Home DME includes a 2WW, WC, TTB, cane, and no pre-existing HHC. Patient reported food insecurity, lack of transportation, and a fear of returning home alone. Patient discussed his struggles since his mother passed away in May 2022. BIMS completed. Room whiteboard updated. Plan is to D/C home alone, with HHC and DME as recommended by therapy staff. F/U to be set with PCP RAFAEL Kaur CNP and will need transport home.     LORIN Givens, TOMMYW, 9/9/2022, 3:01 PM

## 2022-09-09 NOTE — PROGRESS NOTES
Comprehensive Nutrition Assessment    Type and Reason for Visit:  Initial, Consult (oral nutrition supplement)    Nutrition Recommendations/Plan:   Continue carb controlled diet per order   Will resume diabetic oral nutrition supplement  Will continue to follow up during stay      Malnutrition Assessment:  Malnutrition Status:  No malnutrition (09/09/22 9708)    Context:  Acute Illness       Nutrition Assessment:    Admit to acute rehab unit with hx necrotizing fasciitis now s/p left BKA. Currently on carb controlled diet with hx DM. Meal intake varying today, 25-75%. Patient requesting diabetic oral nutrition supplement with meals. Will continue to follow at moderate nutrition risk at this time with hx reduced appetite in illness, increased needs. Nutrition Related Findings:    sitting up in bed for lunch, ate some of meal, hx DM HbA1c 10.5%, glucose POCT some over 200 mg/dL Wound Type: Surgical Incision       Current Nutrition Intake & Therapies:    Average Meal Intake: 26-50%, 51-75%  Average Supplements Intake: None Ordered  ADULT DIET; Regular; 4 carb choices (60 gm/meal)    Anthropometric Measures:  Height: 6' (182.9 cm)  Ideal Body Weight (IBW): 178 lbs (81 kg)    Admission Body Weight: 184 lb 11.9 oz (83.8 kg)  Current Body Weight: 171 lb 4.8 oz (77.7 kg), 96.2 % IBW. Weight Source: Bed Scale  Current BMI (kg/m2): 23.2  Usual Body Weight: 180 lb (81.6 kg) (per patient)  % Weight Change (Calculated): -4.8  Weight Adjustment For: Amputation  Total Adjusted Percentage (Calculated): 5.9  Adjusted Ideal Body Weight (lbs) (Calculated): 167.5 lbs  Adjusted Ideal Body Weight (kg) (Calculated): 76.14 kg  Adjusted % Ideal Body Weight (Calculated): 102.3  Adjusted BMI (kg/m2) (Calculated): 24.6  BMI Categories: Normal Weight (BMI 18.5-24. 9)    Estimated Daily Nutrient Needs:  Energy Requirements Based On: Kcal/kg  Weight Used for Energy Requirements: Current  Energy (kcal/day): 6250-2707 (25-30 negro/kg)  Weight Used for Protein Requirements: Current  Protein (g/day):  (1.2-1.4 g/kg)  Method Used for Fluid Requirements: 1 ml/kcal  Fluid (ml/day): 2300    Nutrition Diagnosis:   Predicted inadequate energy intake related to increase demand for energy/nutrients as evidenced by poor intake prior to admission, wounds    Nutrition Interventions:   Food and/or Nutrient Delivery: Continue Current Diet, Start Oral Nutrition Supplement  Nutrition Education/Counseling: Education initiated  Coordination of Nutrition Care: Continue to monitor while inpatient  Plan of Care discussed with: patient    Goals:     Goals: PO intake 75% or greater, by next RD assessment       Nutrition Monitoring and Evaluation:   Behavioral-Environmental Outcomes: None Identified  Food/Nutrient Intake Outcomes: Food and Nutrient Intake, Supplement Intake  Physical Signs/Symptoms Outcomes: Biochemical Data, Meal Time Behavior, Skin, Weight, Nausea or Vomiting    Discharge Planning:    Continue current diet     Missy Granados RD, LD  Contact: [phone]

## 2022-09-09 NOTE — PROGRESS NOTES
Physical Therapy  . [facility] ARU PHYSICAL THERAPY EVALUATION    Chart Review:  Past Medical History:   Diagnosis Date    Abscess of left foot 4/22/2022    Anemia associated with acute blood loss 4/26/2022    Callus of foot     Right foot - see's Dr. Kevin Kenney    Cellulitis of left foot 4/22/2022    Diabetic ulcer of left midfoot associated with type 2 diabetes mellitus, with muscle involvement without evidence of necrosis ([address] 4/26/2022    Diabetic ulcer of left midfoot associated with type 2 diabetes mellitus, with necrosis of muscle ([address] 6/7/2021    Diabetic ulcer of right midfoot associated with type 2 diabetes mellitus, with fat layer exposed ([address] 8/11/2020    Dizziness     positional    Essential hypertension     Follows with PCP    Hyperlipidemia     Lumbar radiculopathy     Septic embolism ([address] 6/13/2020    Subacute osteomyelitis of left foot ([address] 4/22/2022     Past Surgical History:   Procedure Laterality Date    ACHILLES TENDON SURGERY Right 1/8/2021    RIGHT ACHILLES TENDON LENGTHENING REPAIR performed by Rosalyn Collet, DPM at [address] [place] [address]  03/2018    [facility]    DENTAL SURGERY      teeth extractions -half per patient    HERNIA REPAIR Bilateral 5/27/2020    BIALTERAL HERNIA INGUINAL REPAIR performed by Grisel Carpio MD at [address] [place] [address] Right 9/2/2022    LEG AMPUTATION BELOW KNEE performed by Olvin Harper MD at [address] [place] [address] Right 9/5/2022    LEG AMPUTATION BELOW KNEE REVISION performed by Olvin Harper MD at [address] [place] [address]  2018    SC OFFICE/OUTPT VISIT,PROCEDURE ONLY Left 4/17/2018    L5-S1 HEMILAMINECTOMY, REMOVAL OF DISC LEFT SIDE performed by Tinnie Sever, MD at [facility] Right 1/8/2021    RIGHT TRANSMETATARSAL TOE AMPUTATION performed by Rosalyn Collet, DPM at [facility] Left 4/23/2022    LEFT RAY GREAT TOE AMPUTATION performed by Asaf Quiroz MD at [address] [facility] [address] EXTRACTION       Social History:  Social/Functional History  Lives With: Alone  Type of Home: House  Home Layout: Two level, Able to Live on Main level with bedroom/bathroom  Home Access: Ramped entrance  Bathroom Shower/Tub: Tub/Shower unit (TTB)  Bathroom Toilet: Standard  Bathroom Equipment: Tub transfer bench  Bathroom Accessibility: Accessible  Home Equipment: Tonye Osborn, rolling, Natividad Marines  Has the patient had two or more falls in the past year or any fall with injury in the past year?: Yes (Pt with 3-4 falls in the last year with no significant injuries.)  ADL Assistance: Independent  Homemaking Assistance: Independent  Homemaking Responsibilities: Yes  Meal Prep Responsibility: Primary  Laundry Responsibility: Primary  Cleaning Responsibility: Primary  Bill Paying/Finance Responsibility: Primary  Shopping Responsibility: Primary  Dependent Care Responsibility: No  Health Care Management: Primary  Ambulation Assistance: Independent (Pt has been using 2WW for the last couple months and manual w/c for a couple days prior to admission.)  Transfer Assistance: Independent  Active : Yes  Mode of Transportation: Car  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 Pt with 3-4 falls in the last year.     Restrictions:  Restrictions/Precautions  Restrictions/Precautions: General Precautions, Fall Risk, Weight Bearing  Required Braces or Orthoses?: Yes (Pt with RLE [place].)  Position Activity Restriction  Other position/activity restrictions: IV access to BUEs         Pain Level: 1  Pain Location: Leg    Objective:  Orientation  Overall Orientation Status: Within Functional Limits        Vision  Vision Exceptions: Wears glasses at all times  Hearing  Hearing: Within functional limits    Sensation:  Sensation  Overall Sensation Status: Impaired (Numbness to L foot, R residual limb and B hands.)    Observation:   Observation/Palpation  Observation: Pt in high frazier's upon entrance, [facility] in place RLE, pleasant and agreeable to therapy. Pt with bandaging to L foot d/t wounds. per wound care notes/pictures, wounds are non-healing with one on plantar surface of L foot and other on medial aspect from prior surgical procedure.     ROM:   PROM RLE (degrees)  RLE General PROM: hip WFL, knee extension -5, knee flexion 90 degrees, ankle n/a d/t BKA     PROM LLE (degrees)  LLE PROM: WFL                    Strength:    Strength RLE  Comment: s/p R BKA; knee extension 4/5, hip flexion and abduction 4/5  Strength LLE  Strength LLE: WFL              Bed Mobility:   Lying to Sitting on Side of Bed  Assistance Needed: Independent  CARE Score: 6  Discharge Goal: Independent  Roll Left and Right  Assistance Needed: Independent  CARE Score: 6  Discharge Goal: Independent  Sit to Lying  Assistance Needed: Independent  CARE Score: 6  Discharge Goal: Independent    Transfers:    Sit to Stand  Assistance Needed: Supervision or touching assistance  Comment: CG to 2ww  CARE Score: 4  Chair/Bed-to-Chair Transfer  Assistance Needed: Partial/moderate assistance  Comment: min assist  CARE Score: 3  Discharge Goal: Independent     Car Transfer  Assistance Needed: Supervision or touching assistance  Comment: CG with cues for technique  CARE Score: 4  Discharge Goal: Independent    Ambulation:   Device used PTA: none initially, 2ww and w/c as mobility declined   Walking Ability  Does the Patient Walk?: Yes     Walk 10 Feet  Assistance Needed: Partial/moderate assistance  Comment: min assist with 2ww  CARE Score: 3  Discharge Goal: Independent     Walk 50 Feet with Two Turns  Comment: 27' max distance  Discharge Goal: Independent     Walk 150 Feet  Comment: feel this distance is contraindicated due to non-healing L foot wounds  Reason if not Attempted: Not attempted due to medical condition or safety concerns  CARE Score: 88  Discharge Goal: Not Attempted     Walking 10 Feet on Uneven Surfaces  Assistance Needed: Dependent  Comment: min assist with 2ww and close follow of another with w/c due to unsteadiness, fear  CARE Score: 1  Discharge Goal: Supervision or touching assistance     1 Step (Curb)  Comment: pt stood and lined up to 2\" step, but became very fearful and could not attempt  Reason if not Attempted: Not attempted due to medical condition or safety concerns  CARE Score: 88  Discharge Goal: Supervision or touching assistance     4 Steps  Comment: Pt does not need to perform this task and feel it is contraindicated with LLE foot wound  Reason if not Attempted: Not attempted due to medical condition or safety concerns  CARE Score: 88  Discharge Goal: Not Attempted     12 Steps  Reason if not Attempted: Not attempted due to medical condition or safety concerns  CARE Score: 88  Discharge Goal: Not Attempted    Gait Deviations: []None []Slow adam  [] Increased MELANIE  [] Staggers []Deviated Path  [] Decreased step length  [] Decreased step height  []Decreased arm swing  [] Shuffles  [] Decreased head and trunk rotation  []other:        Wheelchair:  w/c Ability: Wheelchair Ability  Uses a Wheelchair and/or Scooter?: Yes  Wheel 50 Feet with Two Turns  Assistance Needed: Supervision or touching assistance  Comment: B UE propulsion  CARE Score: 4  Discharge Goal: Independent  Wheel 150 Feet  Assistance Needed: Supervision or touching assistance  CARE Score: 4  Discharge Goal: Independent          Balance:        Object: Picking Up Object  Assistance Needed: Partial/moderate assistance  Comment: mod assist for balance with 2ww and reacher  CARE Score: 3  Discharge Goal: Supervision or touching assistance               Assessment:   The patient is a 39year old male admitted onto ARU after 9/1 hospitalization for increased pain. Pt  with  sepsis secondary to necrotizing fasciitis of RLE.  Pt was recently discharged from the hospital on 09/02/22 when he was admitted for pneumatosis coli. Upon returning home, Pt started having excruciating RLE pain, erythema and started noticing clear discharge from his RLE which prompted him to come to the ER. Upon arrival to the ER, imaging revealed necrotizing fasciitis and Pt was taken to OR and underwent right guillotine BKA with wound vac placement. On 09/05, Pt underwent revision and had closed R BKA performed with Pt to be NWB RLE. PTA, Pt living alone completing all ADLs/IADLs with Mod I using 2WW. Pt reports he was Mod I using w/c for a couple days PTA d/t condition of RLE. Pt reports he does not have any family in the area and plans to put his house for sale upon d/c from this ARU and move to [place] to be closer to his brother. Pt's mother passed away in May 2022. Pt presents today requiring min assist for transfers and gait, unable to perform 2\" step due to fear with pt beginning to breath fast and shallow and initially not responding to commands to sit. Pt has significant wounds on L foot that will prevent ability to perform stairs and long distance gait. Feel pt will benefit from ARU-PT to address deficits as noted to return to home at mod I level, using w/c >gait for L foot integrity.        Body Structures, Functions, Activity Limitations Requiring Skilled Therapeutic Intervention: Decreased functional mobility , Decreased endurance, Decreased cognition, Decreased strength, Decreased safe awareness, Decreased high-level IADLs, Decreased balance, Decreased sensation, Increased pain     Therapy Prognosis: Good  Decision Making: High Complexity  Clinical Presentation: unpredictable characteristics      Patient education:   ARU schedule, ARU expectations for participation, plan of care, protection for L foot wounds  Treatment Initiated:  Functional mobility training, gait training, patient education, w/c  Barriers to Improvement:  fear, wounds  Discharge Recommendations:  home with drop in assist  Equipment Recommendations:  pt has w/c, possible 2ww    Goals:  Patient Goals   Patient goals : return home  Short Term Goals  Time Frame for Short term goals: 10-12 days STG=LTG  Short term goal 1: Pt will perform sit to stand, w/c<>bed and car transfers with mod I  Short term goal 2: Pt will ambulate 48' with 2ww onlevel surfaces with mod I, 10' on unlevel surface with supervision  Short term goal 3: Pt will ascend/descend 2\" step with 2ww with supervision  Short term goal 4: Pt will  light object with 2ww and reacher with CGA  Short term goal 5: Pt will perform w/c mobility in household setting with mod I     Plan:    Requires PT Follow-Up: Yes  Pt will be seen at least 60 minutes per day for a minimum of 5 days per week, plus group therapy as appropriate  Plan  Current Treatment Recommendations: Strengthening, ROM, Balance training, Functional mobility training, Transfer training, ADL/Self-care training, IADL training, Cognitive/Perceptual training, Endurance training, Wheelchair mobility training, Gait training, Neuromuscular re-education, Safety education & training, Therapeutic activities, Patient/Caregiver education & training, Equipment evaluation, education, & procurement, Positioning, Home exercise program, Pain management, Stair training    PT Individual Minutes  Time In: 1340  Time Out: 1510  Minutes: 90               PT Evaluation 15   Gait Training 25   Therapeutic Exercise    Neuro Re-Ed    Therapeutic Activity 35   Wheelchair Propulsion 15   Group    Other:    TOTAL 90       Electronically signed by:    Curt Juarez, PT, PT   9/9/2022, @NOW

## 2022-09-09 NOTE — PROGRESS NOTES
[facility]  consulted by Dr. Debora Woodruff for monitoring and adjustment. Indication for treatment: SSTI  Goal trough: [x] 10-15 mcg/mL or [] 15-20 mcg/ml  AUC/LIDIA: [x] <500 or [] 400-600    Pertinent Laboratory Values:   Temp Readings from Last 3 Encounters:   09/09/22 98 °F (36.7 °C) (Oral)   09/08/22 98.3 °F (36.8 °C) (Oral)   09/02/22 98.6 °F (37 °C) (Oral)     Recent Labs     09/07/22  0730 09/08/22  1107   WBC 11.2* 11.6*       No results for input(s): BUN, CREATININE in the last 72 hours. Estimated Creatinine Clearance: 178 mL/min (A) (based on SCr of 0.6 mg/dL (L)). Intake/Output Summary (Last 24 hours) at 9/9/2022 1705  Last data filed at 9/9/2022 1317  Gross per 24 hour   Intake 480 ml   Output --   Net 480 ml         Pertinent Cultures:  Date    Source    Results  9/02   Blood x 2   NGTD      Vancomycin level:   RANDOM:    No results for input(s): VANCORANDOM in the last 72 hours.     Assessment:  Renal trends close to baseline  S/p right leg guillotine BKA  Day(s) of therapy: 1 of 10 (continued from inpatient)  Vancomycin level:   9/3: 10.3 (12 hours post dose) (therapeutic)  9/6: 18.8 ()    Plan:  Continue with vancomycin 1250mg IVPB q12h with a predicted therapeutic trough of 10.4 at steady state  Plan to repeat vancomycin level on Monday and creatinine level tomorrow AM  Pharmacy will continue to monitor patient and adjust therapy as indicated    Abram 3 9/12 @0600    Thank you for the consult,  Song Alcantar, [facility], PharmD

## 2022-09-09 NOTE — PLAN OF CARE
Problem: Discharge Planning  Goal: Discharge to home or other facility with appropriate resources  Outcome: Progressing  Flowsheets (Taken 9/9/2022 0047 by Iveth Her LPN)  Discharge to home or other facility with appropriate resources:   Identify barriers to discharge with patient and caregiver   Arrange for needed discharge resources and transportation as appropriate   Identify discharge learning needs (meds, wound care, etc)   Arrange for interpreters to assist at discharge as needed   Refer to discharge planning if patient needs post-hospital services based on physician order or complex needs related to functional status, cognitive ability or social support system     Problem: Skin/Tissue Integrity  Goal: Absence of new skin breakdown  Description: 1. Monitor for areas of redness and/or skin breakdown  2. Assess vascular access sites hourly  3. Every 4-6 hours minimum:  Change oxygen saturation probe site  4. Every 4-6 hours:  If on nasal continuous positive airway pressure, respiratory therapy assess nares and determine need for appliance change or resting period.   Outcome: Progressing     Problem: Safety - Adult  Goal: Free from fall injury  Outcome: Progressing     Problem: Chronic Conditions and Co-morbidities  Goal: Patient's chronic conditions and co-morbidity symptoms are monitored and maintained or improved  Outcome: Progressing

## 2022-09-09 NOTE — PROGRESS NOTES
Occupational Therapy                              [facility] OCCUPATIONAL THERAPY EVALUATION    Chart Review:  Past Medical History:   Diagnosis Date    Abscess of left foot 4/22/2022    Anemia associated with acute blood loss 4/26/2022    Callus of foot     Right foot - see's Dr. Caitlin Barriga    Cellulitis of left foot 4/22/2022    Diabetic ulcer of left midfoot associated with type 2 diabetes mellitus, with muscle involvement without evidence of necrosis ([address] 4/26/2022    Diabetic ulcer of left midfoot associated with type 2 diabetes mellitus, with necrosis of muscle ([address] 6/7/2021    Diabetic ulcer of right midfoot associated with type 2 diabetes mellitus, with fat layer exposed ([address] 8/11/2020    Dizziness     positional    Essential hypertension     Follows with PCP    Hyperlipidemia     Lumbar radiculopathy     Septic embolism ([address] 6/13/2020    Subacute osteomyelitis of left foot ([address] 4/22/2022     Past Surgical History:   Procedure Laterality Date    ACHILLES TENDON SURGERY Right 1/8/2021    RIGHT ACHILLES TENDON LENGTHENING REPAIR performed by Yadira Eddy DPM at [facility]  03/2018    [facility]    DENTAL SURGERY      teeth extractions -half per patient    HERNIA REPAIR Bilateral 5/27/2020    BIALTERAL HERNIA INGUINAL REPAIR performed by Ja Gooden MD at [address] Right 9/2/2022    LEG AMPUTATION BELOW KNEE performed by Diana Cristobal MD at [address] [facility] Right 9/5/2022    LEG AMPUTATION BELOW KNEE REVISION performed by Diana Cristobal MD at [facility] 30 2018    [place] OFFICE/OUTPT VISIT,PROCEDURE ONLY Left 4/17/2018    L5-S1 HEMILAMINECTOMY, REMOVAL OF DISC LEFT SIDE performed by Sally Wilder MD at [address] Right 1/8/2021    RIGHT TRANSMETATARSAL TOE AMPUTATION performed by Yadira Eddy DPM at [address] Left 4/23/2022    LEFT RAY GREAT TOE AMPUTATION performed by Rahul Ashley MD at [facility]       Social History:  Social/Functional History  Lives With: Alone  Type of Home: House  Home Layout: Two level, Able to Live on Main level with bedroom/bathroom  Home Access: Ramped entrance  Bathroom Shower/Tub: Tub/Shower unit (TTB)  Bathroom Toilet: Standard  Bathroom Equipment: Tub transfer bench  Bathroom Accessibility: Accessible  Home Equipment: Manuela Zurdo, rolling, Alease Conch  Has the patient had two or more falls in the past year or any fall with injury in the past year?: Yes (Pt with 3-4 falls in the last year with no significant injuries.)  ADL Assistance: Independent  Homemaking Assistance: Independent  Homemaking Responsibilities: Yes  Meal Prep Responsibility: Primary  Laundry Responsibility: Primary  Cleaning Responsibility: Primary  Bill Paying/Finance Responsibility: Primary  Shopping Responsibility: Primary  Dependent Care Responsibility: No  Health Care Management: Primary  Ambulation Assistance: Independent (Pt has been using 2WW for the last couple months and manual w/c for a couple days prior to admission.)  Transfer Assistance: Independent  Active : Yes  Mode of Transportation: Car  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 Pt with 3-4 falls in the last year.     Restrictions:  Restrictions/Precautions  Restrictions/Precautions: General Precautions, Fall Risk, Weight Bearing  Required Braces or Orthoses?: Yes (Pt with RLE Ampushield.)  Lower Extremity Weight Bearing Restrictions  Right Lower Extremity Weight Bearing: Non Weight Bearing     Position Activity Restriction  Other position/activity restrictions: IV access to BUEs         Pain Level: 5/10  Pain Location: R residual limb    Objective:  Observation/Palpation  Observation: Pt in high frazier's upon entrance, Ampushield in place RLE, pleasant and agreeable to therapy. Pt with bandaging to L foot d/t wounds. Vision  Vision Exceptions: Wears glasses at all times     Hearing  Hearing: Within functional limits    ROM:      LUE AROM (degrees)  LUE AROM : WNL     Left Hand AROM (degrees)  Left Hand AROM: WNL     RUE AROM (degrees)  RUE AROM : WNL     Right Hand AROM (degrees)  Right Hand AROM: WNL    Strength:    LUE Strength  Gross LUE Strength: WNL  L Hand General: 5/5  LUE Strength Comment: 5/5 grossly  RUE Strength  Gross RUE Strength: WNL  R Hand General: 5/5  RUE Strength Comment: 5/5 grossly    Quality of Movement: Tone RUE  RUE Tone: Normotonic  Tone LUE  LUE Tone: Normotonic  Coordination  Movements Are Fluid And Coordinated: Yes       Sensation:    Sensation  Overall Sensation Status: Impaired (Numbness to L foot, R residual limb and B hands.)     ADLs:  Eating: Eating  Assistance Needed: Independent  Comment: Pt able to open all packages/containers to eat breakfast IND. CARE Score: 6  Discharge Goal: Independent       Oral Hygiene: Oral Hygiene  Assistance Needed: Supervision or touching assistance  Comment: Pt brushed teeth sitting in w/c d/t safety. CARE Score: 4  Discharge Goal: Independent    UB/LB Bathing: Shower/Bathe Self  Assistance Needed: Supervision or touching assistance  Comment: Pt able to wash UB/LB seated for entirety with SBA. Pt weight shifted to wash rear. CARE Score: 4  Discharge Goal: Independent    UB Dressing: Upper Body Dressing  Assistance Needed: Setup or clean-up assistance  Comment: Setup assist to doff/don pullover tshirt. CARE Score: 5  Discharge Goal: Independent         LB Dressing:   Lower Body Dressing  Assistance Needed: Partial/moderate assistance  Comment: Pt able to doff shorts c CGA in stance, Pt required slight Min A to hold pants up as Pt unable to fasten pants in stance, completed seated. Pt able to doff Ampushield, however required Min A to don.   CARE Score: 3  Discharge Goal: Independent    Donning and [place] Footwear: Putting On/Taking Off Footwear  Assistance Needed: Setup or clean-up assistance  Comment: Setup assist to doff/don sock and velcro hospital shoe to L foot. CARE Score: 5  Discharge Goal: Independent      Toile[address] needed: Partial/moderate assistance  Comment: Pt able to manage pants down and complete perineal hygiene after BM, however required assist pulling pants back up. CARE Score: 3  Discharge Goal: Independent      Bed Mobility:    Bed mobility  Supine to Sit: Supervision  Sit to Supine: Supervision    Transfers:    Transfers  Stand Pivot Transfers: Contact guard assistance  Sit to stand: Contact guard assistance  Stand to sit: Contact guard assistance           Toilet Transfer  Assistance needed: Partial/moderate assistance  Comment: Min A with 2WW and grab bar. Pt required safety cues for correct hand placement. CARE Score: 3  Discharge Goal: Independent    Functional Mobility:    Balance  Sitting Balance: Supervision  Standing Balance: Contact guard assistance  Standing Balance  Time: < 1 min  Activity: ADLs  Functional Mobility  Functional - Mobility Device: Rolling Walker  Activity: To/from bathroom  Assist Level: Contact guard assistance     Cognition:  Cognition  Overall Cognitive Status: WFL    Perception:  Perception  Overall Perceptual Status: WFL    Additional Treatment:  Pt able to propel self in w/c ~ 364 ft in hallway with Mod I for BUE strengthening and endurance for ADL tasks.     Assessment:     Performance deficits / Impairments: Decreased functional mobility , Decreased strength, Decreased endurance, Decreased coordination, Decreased ADL status, Decreased safe awareness, Decreased sensation, Decreased high-level IADLs, Decreased balance    The patient is a 39year old male with a PMHx of HTN, DM, and diabetic neuropathy who was admitted onto ARU after hospitalization for presentation to ED with c/o sepsis secondary to necrotizing fasciitis of RLE. Pt was recently discharged from the hospital on 09/02/22 when he was admitted for pneumatosis coli. Upon returning home, Pt started having excruciating RLE pain, erythema and started noticing clear discharge from his RLE which prompted him to come to the ER. Upon arrival to the ER, imaging revealed necrotizing fasciitis and Pt was taken to OR and underwent right guillotine BKA with wound vac placement. On 09/05, Pt underwent revision and had closed R BKA performed with Pt to be NWB RLE. PTA, Pt living alone completing all ADLs/IADLs with Mod I using 2WW. Pt reports he was Mod I using w/c for a couple days PTA d/t condition of RLE. Pt reports he does not have any family in the area and plans to put his house for sale upon d/c from this ARU and move to [place] to be closer to his brother. Today, Pt with obvious fear during session, however able to complete all ADLs and functional transfers with Min A utilizing 2WW. Pt able to manage footwear on L foot with bandaging present d/t wounds. Due to Pt's fear, Pt inconsistent with willingness to trial taking a hand off the walker to assist with ADLs. Pt was able to do so while standing at sink to manage pants, however would not complete at toilet. The QI, MMT, and ROM standardized assessments were used this date to determine the above performance deficits, which compromise pt's ability to safely complete ADLs/IADLs/mobility. Pt will benefit from ARU OT services to increase functional performance and return to PLOF. Decision Making: Medium Complexity  Clinical Presentation:  Pt presents to this ARU with deficits including functional balance/transfers/mobility, endurance, and ADLs/IADLs. Patient education:   ARU procotol, Role of O.T., O.T. plan of care  []   Patient goal was established and reviewed in Rehabtracker with patient and/or family this date. REQUIRES OT FOLLOW-UP: Yes  Discharge Recommendations:  Home c [facility].   Equipment Recommendations: Possible BSC pending progress. Goals:     Short Term Goals  Time Frame for Short term goals: STGs=LTGs  Long Term Goals  Time Frame for Long term goals : 10 days or until d/c. Long Term Goal 1: Pt will complete oral care and grooming tasks c Mod I.  Long Term Goal 2: Pt will complete total body bathing c AE PRN and Mod I.  Long Term Goal 3: Pt will complete UB dressing c IND. Long Term Goal 4: Pt will complete LB dressing c AE PRN and Mod I.  Long Term Goal 5: Pt will doff/don footwear to L foot c AE PRN and Mod I.   Additional Goals?: Yes  Long Term Goal 6: Pt will complete toileting c Mod I.  Long Term Goal 7: Pt will perform functional transfers (bed, chair, toilet, shower) c DME PRN and Mod I.  Long Term Goal 8: Pt will perform therex/therax to facilitate an increase in strength/endurance (c emphasis on dynamic standing balance/tolerance > 8 mins) c Mod I.  Long Term Goal 9: Pt will complete home management tasks c Mod I.    Plan:    Pt will be seen at least 60 minutes per day for a minimum of 5 days per week, plus group therapy as appropriate  Current Treatment Recommendations: Strengthening, Functional mobility training, Endurance training, Pain management, Safety education & training, Patient/Caregiver education & training, Equipment evaluation, education, & procurement, Positioning, Self-Care / ADL, Home management training, Balance training, Coordination training                     Number of Minutes/Billable Intervention      OT Evaluation 20   Therapeutic Exercise    ADL Self-care 60   Neuro Re-Ed    Therapeutic Activity 10   Group    Other:    TOTAL 90     Electronically signed by:    ZITA Cohen, OTR/L #[id number]  9/9/2022, 10:31 AM

## 2022-09-09 NOTE — PROGRESS NOTES
A Complete drug regimen review was completed for this patient this date. [x]  No clinically significant medication issue was identified   []  Yes, a clinically significant medication issue was identified     []  Adverse Drug Event:    []  Allergy:    []  Side Effect:    []  Ineffective Therapy:    []  Drug interaction:     []  Duplicate Therapy:    []  Untreated Indication:    []  Non-adherence:    []  Other:  Nursing contacted the physician:  Dr Carroll Salazar     Date:    9/8/22            Time:2330    Actions recommended by physician were completed:   Date:                 Time:  Action(s) Taken:             [x]  New Physician Order Received    []  Issue Noted by Physician;  However No Action Required    []  Other:

## 2022-09-09 NOTE — PLAN OF CARE
4 Eyes Skin Assessment     NAME:  Vincenzo Hodgkin  YOB: 1980  MEDICAL RECORD NUMBER:  [id number]    The patient is being assess for  Admission    I agree that 2 RN's have performed a thorough Head to Toe Skin Assessment on the patient. ALL assessment sites listed below have been assessed. Areas assessed by both nurses:    Head, Face, Ears, Shoulders, Back, Chest, Arms, Elbows, Hands, Sacrum. Buttock, Coccyx, Ischium, and Legs. Feet and Heels        Does the Patient have a Wound? Yes. As on LDA patient has anew RBKA and great toe and second toe on left foot amputated. Red blanchable coccyx. Heraclio Prevention initiated:  Yes   Wound Care Orders initiated:  No. Wound consult.     Pressure Injury (Stage 3,4, Unstageable, DTI, NWPT, and Complex wounds) if present place referral/consult order under [de-identified] No    New and Established Ostomies if present place consult order under : No      Nurse 1 eSignature: Electronically signed by Ana Norman LPN on 5/6/31 at 8:27 AM EDT    **SHARE this note so that the co-signing nurse is able to place an eSignature**    Nurse 2 eSignature: {Esignature:[id number]}

## 2022-09-09 NOTE — CONSULTS
Consult completed. X2 PIVs placed by floor nurse. Pt has patent IV access and therapeutic needs met.

## 2022-09-09 NOTE — PLAN OF CARE
ARU Interdisciplinary Plan of Care (IPOC)  [facility] Dr. Johana Izquierdo  [facility], [address]  [phone]  Fax: [phone]    Fabiowendy Englerebekah    : 1980  Acct #: [[id number]]  MRN: [id number]   PHYSICIAN:  Priscilla Ye MD  Primary Active Problems:   Active Hospital Problems    Diagnosis Date Noted    Necrotizing fasciitis [facility]) [M72.6] 2022     Priority: Medium       Rehabilitation Diagnosis:     Necrotizing fasciitis [M72.6]  Necrotizing fasciitis [facility]) [M72.6]          CARE PLAN     NURSING:  Carvel Gauze while on this unit will:      Bowel and Bladder   [x] Be continent of bowel and bladder      [x] Have an adequate number of bowel movements   [x] Urinate with no urinary retention >300ml in bladder   [] Bladder Scan: (details)   [] Complete bladder protocol with montilla removal   [] Initiate Bladder Program to toilet every ___ hours   [] Initiate Bowel Program to toilet every ___hours   [] Bladder training    [] Bowel training  Pulmonary   [x] Maintain O2 SATs at 92% or greater  Pain Management   [x] Have pain managed while on ARU        [] Be pain free by discharge    [] Medication Management and Education  Maintenance of Skin Integrity/Wound Management   [x] Have no skin breakdown while on ARU   [x] Have improved skin integrity via wound measurements   [x] Have no signs/symptoms of infection via infection protection and monitoring at the          wound site  Fall Prevention   [x] Be free from injury during hospitalization via fall prevention measures     [] Disease management and Education  Precautions   [x] Weight Bearing Precautions   [] Swallowing Precautions   [x] Monitoring of Risks of Complications   [x] DVT Prophylaxis    [] Fluid/electrolyte/Nutrition Management    [x] Complete education with patient/family with understanding demonstrated for          in-room safety with transfers to bed, toilet, wheelchair, shower as well as bathroom activities and hygiene. [] Adjustment   [] Other:   Nursing interventions may include bowel/bladder training, education for medical assistive devices, medication education, O2 saturation management, energy conservation, stress management techniques, fall prevention, alarms protocol, seating and positioning, skin/wound care, pressure relief instruction,dressing changes,  infection protection, DVT prophylaxis, and/or assistance with in room safety with transfers to bed, toilet, wheelchair, shower as well as bathroom activities and hygiene. Patient/caregiver education for:   [x] Disease/sustained injury/management      [x] Medication Use   [x] Surgical intervention   [x] Safety/Precautions   [x] Body mechanics and or joint protection   [x] Health maintenance         PHYSICAL THERAPY:  Goals:                  Short Term Goals  Time Frame for Short term goals: 10-12 days STG=LTG  Short term goal 1: Pt will perform sit to stand, w/c<>bed and car transfers with mod I  Short term goal 2: Pt will ambulate 48' with 2ww onlevel surfaces with mod I, 10' on unlevel surface with supervision  Short term goal 3: Pt will ascend/descend 2\" step with 2ww with supervision  Short term goal 4: Pt will  light object with 2ww and reacher with CGA  Short term goal 5: Pt will perform w/c mobility in household setting with mod I               These goals were reviewed with this patient at the time of assessment and Letty Strong is in agreement. Plan of Care: Pt to be seen 5 days per week for a minimum of 60 minutes for 11 days.                 Current Treatment Recommendations: Strengthening, ROM, Balance training, Functional mobility training, Transfer training, ADL/Self-care training, IADL training, Cognitive/Perceptual training, Endurance training, Wheelchair mobility training, Gait training, Neuromuscular re-education, Safety education & training, Therapeutic activities, Patient/Caregiver education & training, Equipment evaluation, education, & procurement, Positioning, Home exercise program, Pain management, Stair training community reintegration,animal assisted therapy, and concurrent/group therapy.     PT IRF-DAYNA scores and goals for initial assessment:   Bed Mobility:   Sit to Lying  Assistance Needed: Independent  CARE Score: 6  Discharge Goal: Independent  Roll Left and Right  Assistance Needed: Independent  CARE Score: 6  Discharge Goal: Independent  Lying to Sitting on Side of Bed  Assistance Needed: Independent  CARE Score: 6  Discharge Goal: Independent    Transfers:    Sit to Stand  Assistance Needed: Supervision or touching assistance  Comment: CG to 2ww  CARE Score: 4  Chair/Bed-to-Chair Transfer  Assistance Needed: Partial/moderate assistance  Comment: min assist  CARE Score: 3  Discharge Goal: Independent     Car Transfer  Assistance Needed: Supervision or touching assistance  Comment: CG with cues for technique  CARE Score: 4  Discharge Goal: Independent    Ambulation:    Walking Ability  Does the Patient Walk?: Yes     Walk 10 Feet  Assistance Needed: Partial/moderate assistance  Comment: min assist with 2ww  CARE Score: 3  Discharge Goal: Independent     Walk 50 Feet with Two Turns  Comment: 27' max distance  Discharge Goal: Independent     Walk 150 Feet  Comment: feel this distance is contraindicated due to non-healing L foot wounds  Reason if not Attempted: Not attempted due to medical condition or safety concerns  CARE Score: 88  Discharge Goal: Not Attempted     Walking 10 Feet on Uneven Surfaces  Assistance Needed: Dependent  Comment: min assist with 2ww and close follow of another with w/c due to unsteadiness, fear  CARE Score: 1  Discharge Goal: Supervision or touching assistance     1 Step (Curb)  Comment: pt stood and lined up to 2\" step, but became very fearful and could not attempt  Reason if not Attempted: Not attempted due to medical condition or safety concerns  CARE Score: 88  Discharge Goal: Supervision or touching assistance     4 Steps  Comment: Pt does not need to perform this task and feel it is contraindicated with LLE foot wound  Reason if not Attempted: Not attempted due to medical condition or safety concerns  CARE Score: 88  Discharge Goal: Not Attempted     12 Steps  Reason if not Attempted: Not attempted due to medical condition or safety concerns  CARE Score: 88  Discharge Goal: Not Attempted    Gait Deviations: []None []Slow adam  [] Increased MELANIE  [] Staggers []Deviated Path  [] Decreased step length  [] Decreased step height  []Decreased arm swing  [] Shuffles  [] Decreased head and trunk rotation  []other:        Wheelchair:  w/c Ability: Wheelchair Ability  Uses a Wheelchair and/or Scooter?: Yes  Wheel 50 Feet with Two Turns  Assistance Needed: Supervision or touching assistance  Comment: B UE propulsion  CARE Score: 4  Discharge Goal: Independent  Wheel 150 Feet  Assistance Needed: Supervision or touching assistance  CARE Score: 4  Discharge Goal: Independent          Balance:        Object: Picking Up Object  Assistance Needed: Partial/moderate assistance  Comment: mod assist for balance with 2ww and reacher  CARE Score: 3  Discharge Goal: Supervision or touching assistance  OCCUPATIONAL THERAPY:  Goals:             Short Term Goals  Time Frame for Short term goals: STGs=LTGs :  Long Term Goals  Time Frame for Long term goals : 10 days or until d/c. Long Term Goal 1: Pt will complete oral care and grooming tasks c Mod I.  Long Term Goal 2: Pt will complete total body bathing c AE PRN and Mod I.  Long Term Goal 3: Pt will complete UB dressing c IND. Long Term Goal 4: Pt will complete LB dressing c AE PRN and Mod I.  Long Term Goal 5: Pt will doff/don footwear to L foot c AE PRN and Mod I.   Additional Goals?: Yes  Long Term Goal 6: Pt will complete toileting c Mod I.  Long Term Goal 7: Pt will perform functional transfers (bed, chair, toilet, shower) c DME PRN and Mod I.  Long Term Goal 8: Pt will perform therex/therax to facilitate an increase in strength/endurance (c emphasis on dynamic standing balance/tolerance > 8 mins) c Mod I.  Long Term Goal 9: Pt will complete home management tasks c Mod I. :    These goals were reviewed with this patient at the time of assessment and Jillian Gallardo is in agreement    Plan of Care:  Pt to be seen 5 days per week for a minimum of 60 minutes for 10 days. Plan  Times per Day: Daily  Current Treatment Recommendations: Strengthening, Functional mobility training, Endurance training, Pain management, Safety education & training, Patient/Caregiver education & training, Equipment evaluation, education, & procurement, Positioning, Self-Care / ADL, Home management training, Balance training, Coordination training         cognitive training, home management, energy conservation training, community reintegration, splint fabrication, patient/caregiver education and training, animal assisted therapy, and concurrent and/or group therapy. OT IRF-DAYNA scores and goals for initial assessment:    ADLs:    Eating: Eating  Assistance Needed: Independent  Comment: Pt able to open all packages/containers to eat breakfast IND. CARE Score: 6  Discharge Goal: Independent       Oral Hygiene: Oral Hygiene  Assistance Needed: Supervision or touching assistance  Comment: Pt brushed teeth sitting in w/c d/t safety. CARE Score: 4  Discharge Goal: Independent    UB/LB Bathing: Shower/Bathe Self  Assistance Needed: Supervision or touching assistance  Comment: Pt able to wash UB/LB seated for entirety with SBA. Pt weight shifted to wash rear. CARE Score: 4  Discharge Goal: Independent    UB Dressing: Upper Body Dressing  Assistance Needed: Setup or clean-up assistance  Comment: Setup assist to doff/don pullover tshirt.   CARE Score: 5  Discharge Goal: Independent         LB Dressing: Lower Body Dressing  Assistance Needed: Partial/moderate assistance  Comment: Pt able to doff shorts c CGA in stance, Pt required slight Min A to hold pants up as Pt unable to fasten pants in stance, completed seated. Pt able to doff [place], however required Min A to don. CARE Score: 3  Discharge Goal: Independent    Donning and [place] Footwear: Putting On/Taking Off Footwear  Assistance Needed: Setup or clean-up assistance  Comment: Setup assist to doff/don sock and velcro hospital shoe to L foot. CARE Score: 5  Discharge Goal: Independent      Toiletin [address] needed: Partial/moderate assistance  Comment: Pt able to manage pants down and complete perineal hygiene after BM, however required assist pulling pants back up. CARE Score: 3  Discharge Goal: Independent      Toilet Transfers: Toilet Transfer  Assistance needed: Partial/moderate assistance  Comment: Min A with 2WW and grab bar. Pt required safety cues for correct hand placement. CARE Score: 3  Discharge Goal: Independent      SPEECH THERAPY: (If ordered)  Plan of Care and Goals:   LTG                                                            LTG:                           Treatments may include speech/language/communication therapy, cognitive training, animal assisted therapy, group therapy, education, and/or dysphagia therapy based on the above goals. Co-treats where appropriate with PT or OT to facilitate patient goals in functional tasks. These goals were reviewed with this patient at the time of assessment and Gigi Lamb is in agreement. CASE MANAGEMENT:  Goals:   Assist patient/family with discharge planning, patient/family counseling, assistance in obtaining recommended equipment and other services, and coordination with insurance during ARU stay. Patient Goals:  Return to maximum level of independent function. Patient goal is to return home alone.       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 [] In this rare instance due to the nature of this patient's medical involvement, this patient will be seen 15 hours per week (900 minutes within a 7 day period). Treatments may include therapeutic exercises, gait training, neuromuscular re-ed, transfer training, community reintegration, bed mobility, w/c mobility and training, self care, home mgmt, cognitive training, energy conservation,dysphagia tx, speech/language/communication therapy, group therapy, and patient/family education. In addition, dietician/nutritionist may monitor calorie count as well as intake and collaboratively work with SLP on dietary upgrades. Neuropsychology/Psychology may evaluate and provide necessary support. Group therapy as appropriate to facilitate improved endurance, STR, COORD, function, safety, transfers, awareness and insight into deficits, problem solving, memory, and social interaction and engagement.     Medical issues being managed closely and that require 24 hour availability of a physician:   [] Swallowing Precautions                                     [x] Weight bearing precautions   [x] Wound Care                             [x] Infection Prevention   [x] DVT Prophylaxis/assessment              [x] Monitoring for complications    [x] Fall Precautions/Prevention                         [x] Fluid/Electrolyte/Nutrition Balance   [] Voice Protection                           [x] Medication Management   [x] Respiratory                   [x] Pain Mgmt   [x] Bowel/Bladder Fx    Medical Prognosis: [] Good  [x] Fair    [] Guarded   Total expected IRF days 14                                            Physician anticipated functional outcomes:  FWW and WC with [facility] OT/PT/RN and supervision. Rehab Goals:   [] Return to premorbid function of_______________________________.    [] Independent   [] Mod I  [x] Supervision  [] CGA   [] Min A   [] Mod A  Level for ambulation []without assistive device  [x] with assistive device        [] Independent   [] Mod I  [x] Supervision [] CGA   [] Min A   [] Mod A  Level for transfers []without assistive device  [x] with assistive device         [] Independent   [] Mod I  [x] Supervision [] CGA   [] Min A   [] Mod A Level with ADL's []without assistive device   [x] with assistive device     ___________________     Level with cognitive skills requiring [] No assist [x] Supervision  [] Active Assist/Cues     [] Maximize level of mobility and ADL's to decrease burden on caregiver    IPOC brief synthesis of Preadmission Screen, Post-Admission Evaluation, and Therapy Evaluations: Acute inpatient rehabilitation with occupational and physical therapy 180 minutes 5 out of every 7 days. Will address basic and  advancing mobility with self-care instruction and adaptive equipment training. Caregiver education will be offered. Expected length of stay  prior to a supervised level of function for discharge home with a walker and [facility] OT/PT is 2 weeks. Additional recommendation:     Necrotizing fasciitis of the right lower limb with amputation: The patient requires daily occupational and physical therapy. He is on IV Maxipime, Cleocin and vancomycin for at least 10 more days. The original discharge readmit orders from the hospitalist did not include these antibiotics. When I checked with discharging hospitalist, she indicated that the antibiotic should be continuing despite the lack of their presence on the transfer orders. He needs aggressive pulm management, wound care and DVT prophylaxis.   We must provide instruction for self-care activities and mobility with techniques for no weightbearing through the right lower limb.  Caregiver training will be offered. He needs aggressive pulmonary hygiene measures, nutritional support and wound care of the left foot. Outpatient follow-up with his surgeon and PCP. DVT prophylaxis: Lovenox 40 mg subcu daily. I must monitor his hemoglobin and platelet count while on this medication. Weightbearing activities will be pursued through the left foot daily. GI prophylaxis is offered. Uncontrolled pain: Injectable morphine and oral oxycodone are available as needed. Acetaminophen. Believe would be a candidate for gabapentin if he does not have any historical intolerance for that medicine. Bowel intervention while on narcotics. Limb elevation and progressive mobilization. Necrotizing fasciitis: IV antibiotics as mentioned above. Monitoring him for signs of further soft tissue compromise. Monitoring his fever curve and for any leukocytosis. Uncontrolled diabetes type 2 with peripheral neuropathy: Patient requires a diet modified for carbohydrates. He is on scheduled Lantus and Humalog sliding scale. Blood sugars are checked at mealtime and bedtime. Hypertension: Cozaar and Toprol for blood pressure regulation. Vital signs are checked at rest and with activity. Target systolic blood pressures 257-698. Pneumatosis coli: The prescribed oral antibiotics last week or being covered with the injectable medications now. Monitoring him for GI symptoms. Outpatient follow-up with GI. Left foot diabetic ulcer: Wound care team consulted to manage local wound care. Monitoring drainage and tenderness. Weightbearing through the left heel and plantar surface for now. Depression with anxiety: Sleep regulation, pain management and treatment in a calm and consistent environment. Caregiver training and involved in the planning for discharge.      Anticipated discharge destination:    [] Home Independently   [x] Home with supervision    []SNF     [] Other       This plan has been reviewed with me in a language I understand.  I have had the opportunity to include my input with my therapy team.    ________________________________________________   ______________________  Patient/Significant Other      Date    I have reviewed this initial plan of care and agree with its contents:    Title   Name    Date    Time    Physician: Gill Goyal MD 9/10/2022 11:24 AM    Case Mgmt: Ponce Holter MSW LSW 9/9/2022 9:19 AM     OT: Doni Arreguin, [facility], OTR/L #[id number] 09/09/22 [address], PT 9/9/22, [address]    RN: Rehan Covarrubias:    Dietician: Hebert Liao, TETE  09/09/2022  15:41     ADMIT Porfirio Trent

## 2022-09-10 PROBLEM — R53.1 GENERALIZED WEAKNESS: Status: ACTIVE | Noted: 2022-09-10

## 2022-09-10 PROBLEM — F41.8 DEPRESSION WITH ANXIETY: Status: ACTIVE | Noted: 2022-09-10

## 2022-09-10 PROBLEM — Z89.512 STATUS POST BELOW KNEE AMPUTATION, LEFT (HCC): Status: ACTIVE | Noted: 2022-09-10

## 2022-09-10 PROBLEM — R52 UNCONTROLLED PAIN: Status: ACTIVE | Noted: 2022-09-10

## 2022-09-10 LAB
CREAT SERPL-MCNC: 1.7 MG/DL (ref 0.9–1.3)
GFR AFRICAN AMERICAN: 54 ML/MIN/1.73M2
GFR NON-AFRICAN AMERICAN: 45 ML/MIN/1.73M2
GLUCOSE BLD-MCNC: 143 MG/DL (ref 70–99)
GLUCOSE BLD-MCNC: 165 MG/DL (ref 70–99)
GLUCOSE BLD-MCNC: 174 MG/DL (ref 70–99)
GLUCOSE BLD-MCNC: 213 MG/DL (ref 70–99)

## 2022-09-10 PROCEDURE — 82962 GLUCOSE BLOOD TEST: CPT

## 2022-09-10 PROCEDURE — 94761 N-INVAS EAR/PLS OXIMETRY MLT: CPT

## 2022-09-10 PROCEDURE — 1280000000 HC REHAB R&B

## 2022-09-10 PROCEDURE — 97110 THERAPEUTIC EXERCISES: CPT

## 2022-09-10 PROCEDURE — 36415 COLL VENOUS BLD VENIPUNCTURE: CPT

## 2022-09-10 PROCEDURE — 2500000003 HC RX 250 WO HCPCS: Performed by: PHYSICAL MEDICINE & REHABILITATION

## 2022-09-10 PROCEDURE — 6360000002 HC RX W HCPCS: Performed by: PHYSICAL MEDICINE & REHABILITATION

## 2022-09-10 PROCEDURE — 2580000003 HC RX 258: Performed by: PHYSICAL MEDICINE & REHABILITATION

## 2022-09-10 PROCEDURE — 6370000000 HC RX 637 (ALT 250 FOR IP): Performed by: STUDENT IN AN ORGANIZED HEALTH CARE EDUCATION/TRAINING PROGRAM

## 2022-09-10 PROCEDURE — 97116 GAIT TRAINING THERAPY: CPT

## 2022-09-10 PROCEDURE — 97530 THERAPEUTIC ACTIVITIES: CPT

## 2022-09-10 PROCEDURE — 6360000002 HC RX W HCPCS: Performed by: STUDENT IN AN ORGANIZED HEALTH CARE EDUCATION/TRAINING PROGRAM

## 2022-09-10 PROCEDURE — 94150 VITAL CAPACITY TEST: CPT

## 2022-09-10 PROCEDURE — 82565 ASSAY OF CREATININE: CPT

## 2022-09-10 PROCEDURE — 97535 SELF CARE MNGMENT TRAINING: CPT

## 2022-09-10 PROCEDURE — 76937 US GUIDE VASCULAR ACCESS: CPT

## 2022-09-10 RX ADMIN — OXYCODONE HYDROCHLORIDE 5 MG: 5 TABLET ORAL at 11:03

## 2022-09-10 RX ADMIN — PANTOPRAZOLE SODIUM 20 MG: 20 TABLET, DELAYED RELEASE ORAL at 05:33

## 2022-09-10 RX ADMIN — FLUOXETINE 80 MG: 20 CAPSULE ORAL at 10:56

## 2022-09-10 RX ADMIN — CEFEPIME HYDROCHLORIDE 2000 MG: 2 INJECTION, POWDER, FOR SOLUTION INTRAVENOUS at 06:14

## 2022-09-10 RX ADMIN — CLINDAMYCIN IN 5 PERCENT DEXTROSE 900 MG: 18 INJECTION, SOLUTION INTRAVENOUS at 02:11

## 2022-09-10 RX ADMIN — CLINDAMYCIN IN 5 PERCENT DEXTROSE 900 MG: 18 INJECTION, SOLUTION INTRAVENOUS at 11:12

## 2022-09-10 RX ADMIN — CEFEPIME HYDROCHLORIDE 2000 MG: 2 INJECTION, POWDER, FOR SOLUTION INTRAVENOUS at 18:02

## 2022-09-10 RX ADMIN — SUCRALFATE 1 G: 1 TABLET ORAL at 20:56

## 2022-09-10 RX ADMIN — ATORVASTATIN CALCIUM 40 MG: 40 TABLET, FILM COATED ORAL at 20:56

## 2022-09-10 RX ADMIN — OXYCODONE HYDROCHLORIDE 10 MG: 10 TABLET ORAL at 18:08

## 2022-09-10 RX ADMIN — METOPROLOL SUCCINATE 25 MG: 25 TABLET, EXTENDED RELEASE ORAL at 10:56

## 2022-09-10 RX ADMIN — CLINDAMYCIN IN 5 PERCENT DEXTROSE 900 MG: 18 INJECTION, SOLUTION INTRAVENOUS at 18:58

## 2022-09-10 RX ADMIN — LOSARTAN POTASSIUM 25 MG: 25 TABLET, FILM COATED ORAL at 10:56

## 2022-09-10 RX ADMIN — SUCRALFATE 1 G: 1 TABLET ORAL at 10:56

## 2022-09-10 RX ADMIN — DOCUSATE SODIUM 100 MG: 100 CAPSULE, LIQUID FILLED ORAL at 10:56

## 2022-09-10 RX ADMIN — ENOXAPARIN SODIUM 40 MG: 40 INJECTION SUBCUTANEOUS at 10:56

## 2022-09-10 RX ADMIN — INSULIN GLARGINE 30 UNITS: 100 INJECTION, SOLUTION SUBCUTANEOUS at 20:58

## 2022-09-10 RX ADMIN — VANCOMYCIN HYDROCHLORIDE 1250 MG: 1.25 INJECTION, POWDER, LYOPHILIZED, FOR SOLUTION INTRAVENOUS at 05:34

## 2022-09-10 ASSESSMENT — PAIN DESCRIPTION - DESCRIPTORS: DESCRIPTORS: STABBING

## 2022-09-10 ASSESSMENT — PAIN DESCRIPTION - ORIENTATION
ORIENTATION: RIGHT
ORIENTATION: RIGHT

## 2022-09-10 ASSESSMENT — PAIN SCALES - GENERAL
PAINLEVEL_OUTOF10: 6
PAINLEVEL_OUTOF10: 2
PAINLEVEL_OUTOF10: 7

## 2022-09-10 ASSESSMENT — PAIN DESCRIPTION - LOCATION
LOCATION: LEG
LOCATION: LEG

## 2022-09-10 NOTE — PROGRESS NOTES
[facility]  consulted by Dr. Carmen Clemente for monitoring and adjustment. Indication for treatment: SSTI  Goal trough: [x] 10-15 mcg/mL or [] 15-20 mcg/ml  AUC/LIDIA: [x] <500 or [] 400-600    Pertinent Laboratory Values:   Temp Readings from Last 3 Encounters:   09/10/22 97.9 °F (36.6 °C) (Oral)   09/08/22 98.3 °F (36.8 °C) (Oral)   09/02/22 98.6 °F (37 °C) (Oral)     Recent Labs     09/08/22  1107   WBC 11.6*       Recent Labs     09/10/22  0134   CREATININE 1.7*       Estimated Creatinine Clearance: 63 mL/min (A) (based on SCr of 1.7 mg/dL (H)). Intake/Output Summary (Last 24 hours) at 9/10/2022 1131  Last data filed at 9/10/2022 0918  Gross per 24 hour   Intake 440 ml   Output --   Net 440 ml         Pertinent Cultures:  Date    Source    Results  9/02   Blood x 2   NGTD      Vancomycin level:   RANDOM:    No results for input(s): VANCORANDOM in the last 72 hours.     Assessment:  Renal function:   GASTON, previous Scr in range 0.6-0.7  Scr with increase to 1.7  Repeat renal function panel tomorrow AM to trend and make sure GASTON is not worsening  S/p right leg josé miguel WAYNE  Day(s) of therapy: 2 of 10 (continued from inpatient)  Vancomycin level:   9/3: 10.3 (12 hours post dose) (therapeutic)  9/6: 18.8 ()    Plan:  Based on trends, adjust (reduce) vancomycin dosing from 1250 mg IVPB q12h to vancomycin 1250 mg q24h  Predicted trough: 13.5,  at steady-state  Obtain a random level tomorrow AM as patient was previously receiving q12h vancomycin while inpatient and could have accumulated (last renal lab collected on 9/06 was WNL)  Repeat renal labs tomorrow AM to trend  Pharmacy will continue to monitor patient and adjust therapy as indicated    VANCOMYCIN CONCENTRATION SCHEDULED FOR 9/11 @0600    Thank you for the consult,  Juvenal Hurley [facility], PharmD

## 2022-09-10 NOTE — PROGRESS NOTES
Physical Rehabilitation: OCCUPATIONAL THERAPY     [x] daily progress note       [] discharge       Patient Name:  Dulce Friedman   :  1980 MRN: [id number]  Room:  [address] [place], [place] [address] Date of Admission: 2022  Rehabilitation Diagnosis:   Necrotizing fasciitis [M72.6]  Necrotizing fasciitis ([address] [M72.6]       Date 9/10/2022       Day of ARU Week:  3   Time IN//1040; 1307/1400   Individual Tx Minutes 67+53   Group Tx Minutes    Co-Treat Minutes    Concurrent Tx Minutes    TOTAL Tx Time Mins 120   Variance Time    Variance Time []   Refusal due to:     []   Medical hold/reason:    []   Illness   []   Off Unit for test/procedure  []   Extra time needed to complete task  []   Therapeutic need  []   Other (specify):   Restrictions Restrictions/Precautions: General Precautions, Fall Risk, Weight Bearing         Communication with other providers: [x]   OK to see per nursing:     []   Spoke with team member regarding:      Subjective observations and cognitive status: Pt in high semi-fowlers upon therapist arrival. Pt pleasant and agreeable to therapy. PM: Pt in high-semi fowlers upon therapist arrival being seen by dietary. Pt pleasant and agreeable to therapy. Pain level/location:   3 /10       Location: LLE distally, incision area; Pm: no pain per pt   Discharge recommendations  Anticipated discharge date:  TBD  Destination: []home alone   []home alone w assist prn   [] home w/ family    [] Continuous supervision       []SNF    [] Assisted living     [] Other:   Continued therapy: []HHC OT  []OUTPATIENT  OT   [] No Further OT  Equipment needs: none       ADLs:      LB Dressing: Lower Body Dressing  Assistance Needed: Partial/moderate assistance  Comment: Pt able to doff shorts c CGA in stance, Pt required slight Min A to hold pants up as Pt unable to fasten pants in stance, completed seated. Pt able to doff [place], however required Min A to don.   CARE Score: 3  Discharge Goal: Independent    Donning and [place] Footwear: Putting On/Taking Off Footwear  Assistance Needed: Setup or clean-up assistance  Comment: Setup assist to doff/don sock and velcro hospital shoe to L foot. CARE Score: 5  Discharge Goal: Independent      Toileting hygeine: SBA for CM  Toileting tx: SBA/CGA for balance      Bed Mobility:           []   Pt received out of bed   Rolling R/L:  Sup  Scooting:  Sup  Supine --> Sit:  Sup  Sit --> Supine:  Sup    Transfers:    Sit--> Stand:  SBA/CGA  Stand --> Sit:   SBA/CGA  Stand-Pivot:   SBA/CGA  Other:    Assistive device required for transfer:   FWW      Functional Mobility:  Around room, ARU hallway, and therapy gym  Assistance:  SBA/CGA  Device:   [x]   Rolling Walker     []   Standard Walker []   Wheelchair        []   [employer]       []   4-Wheeled Juanna Kenning         []   Cardiac Walker       []   Other:        Additional Therapeutic activities/exercises completed this date:     []   ADL Training   [x]   Balance/Postural training-AM: Pt participated in dynamic standing balance game to increase standing balance, ax tolerance, and I c ADLs/IADLs. Pt used Unilateral LUE support for balance. Pt required 3 seated rest breaks standing for ~2-3 mins. Pt instructed on body positioning, reaching outside MELANIE, and EC tips. [x]   Bed/Transfer Training   [x]   Endurance Training-PM: Pt completed static standing ax at table top to increase balance, ax tolerance, and endurance. Pt required 2 seated rest breaks throughout ax standing for 1:50 and 2:25. Pt instructed on self awareness of energy levels, pacing and body positioning to increase safety c standing.     []   Neuromuscular Re-ed   []   Nu-step:  Time:        Level:         #Steps:       []   Rebounder:    []  Seated     []  Standing        []   Supine Ther Ex (reps/sets):     [x]   Seated Ther Ex (reps/sets): AM: Pt completed UE strengthening ax c 5lb dumbell for 3sets x10 reps c 3 exercises to increase UE strength, ax tolerance, and safety c ADLs/IADLs. Pt required 3 rest breaks in between sets. Pt instructed on pacing, EC tips, and body/hand placements. PM: Pt completed seated bilateral reciprocal patterened movement for 10 minutes to increase ax tolerance, UE strength, and I C ADLs/IADLs. Pt completed 3:08, 6:05, and 10:00minues requiring 2 rest breaks throughout ax. Pt instructed on importance of OT, pacing, and EC tips   []   Standing Ther Ex (reps/sets):     []   Other:      Comments:      Patient/Caregiver Education and Training:   []   Adaptive Equipment Use  [x]   Bed Mobility/Transfer Technique/Safety  [x]   Energy Conservation Tips  []   Family training  [x]   Postural Awareness  [x]   Safety During Functional Activities  []   Reinforced Patient's Precautions   []   Progress was updated and reviewed in Re[facility] with patient and/or family this         date.     Treatment Plan for Next Session: POC to continue as tolerated per pt      Treatment/Activity Tolerance:   [x] Tolerated treatment with no adverse effects    [] Patient limited by fatigue  [] Patient limited by pain   [] Patient limited by medical complications:    [] Adverse reaction to Tx:   [] Significant change in status    Safety:       [x]  bed alarm set    []  chair alarm set    []  Pt refused alarms                []  Telesitter activated      [x]  Gait belt used during tx session      []other:       Number of Minutes/Billable Intervention  Therapeutic Exercise 37+15   ADL Self-care 15   Neuro Re-Ed    Therapeutic Activity 15+38   Group    Other:    TOTAL 120       Social History  Social/Functional History  Lives With: Alone  Type of Home: House  Home Layout: Two level, Able to Live on Main level with bedroom/bathroom  Home Access: Ramped entrance  Bathroom Shower/Tub: Tub/Shower unit (TTB)  Bathroom Toilet: Standard  Bathroom Equipment: Tub transfer bench  Bathroom Accessibility: Accessible  Home Equipment: melissa Beatty Carolann Lawrence  Has the patient Times per week: 5 days per week for a minimum of 60 minutes/day plus group as appropriate for 60 minutes.   Treatment to include Plan  Times per Day: Daily  Current Treatment Recommendations: Strengthening, Functional mobility training, Endurance training, Pain management, Safety education & training, Patient/Caregiver education & training, Equipment evaluation, education, & procurement, Positioning, Self-Care / ADL, Home management training, Balance training, Coordination training    Electronically signed by   HEMANT Julio Arm,  9/10/2022, 8:20 AM

## 2022-09-10 NOTE — H&P
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 History of present illness: The patient is a 49-year-old right-hand-dominant male with chronic manifestations of diabetes and hypertension with dysvascular wounds of his legs. 3 months ago he had an amputation of the left great toe due to uncontrolled infection. He was in and out of the hospital twice in the last days of August of this year with nausea and GI complaints. He was diagnosed with pneumatosis coli and discharged home on oral antibiotics on 2022. Within 2 hours he was back in our ED with increased drainage, pain and erythema in the right lower limb. He was diagnosed with necrotizing fasciitis and had an urgent guillotine amputation of the right lower limb below the knee by Dr. Silvia Rivers. She placed a wound VAC to on the stump at that time. On 2022 Dr. Sarah Hawley performed a primary closure of his right BKA stump. Patient's blood pressure has been fluctuating significantly and he was diagnosed with sepsis associated with the necrotizing fasciitis and pneumatosis coli. His blood sugars have been fluctuating significantly and his pain is poorly controlled. He has been unable to do his own toileting, transfers and self-care and cannot return directly home. He requires inpatient rehabilitation to address these issues. Review of systems: Fair appetite. Occasional nausea. No emesis. Occasional bowel movement.   Significant right stump and 4/26/2022    Callus of foot     Right foot - see's Dr. Loraine Skaggs    Cellulitis of left foot 4/22/2022    Diabetic ulcer of left midfoot associated with type 2 diabetes mellitus, with muscle involvement without evidence of necrosis ([address] 4/26/2022    Diabetic ulcer of left midfoot associated with type 2 diabetes mellitus, with necrosis of muscle ([address] 6/7/2021    Diabetic ulcer of right midfoot associated with type 2 diabetes mellitus, with fat layer exposed ([address] 8/11/2020    Dizziness     positional    Essential hypertension     Follows with PCP    Hyperlipidemia     Lumbar radiculopathy     Septic embolism ([address] 6/13/2020    Subacute osteomyelitis of left foot ([address] 4/22/2022        Past Surgical History:     Past Surgical History:   Procedure Laterality Date    ACHILLES TENDON SURGERY Right 1/8/2021    RIGHT ACHILLES TENDON LENGTHENING REPAIR performed by Elliott Cole DPM at [facility]  03/2018    [facility]    DENTAL SURGERY      teeth extractions -half per patient    HERNIA REPAIR Bilateral 5/27/2020    BIALTERAL HERNIA INGUINAL REPAIR performed by David Morillo MD at [address] Right 9/2/2022    LEG AMPUTATION BELOW KNEE performed by Adrianna Parrish MD at [address] [facility] Right 9/5/2022    LEG AMPUTATION BELOW KNEE REVISION performed by Adrianna Parrish MD at [facility] 30 2018    DC OFFICE/OUTPT VISIT,PROCEDURE ONLY Left 4/17/2018    L5-S1 HEMILAMINECTOMY, REMOVAL OF DISC LEFT SIDE performed by Gail Murphy MD at [facility] Right 1/8/2021    RIGHT TRANSMETATARSAL TOE AMPUTATION performed by Elliott Cole DPM at [facility] Left 4/23/2022    LEFT RAY GREAT TOE AMPUTATION performed by Lior Bryan MD at [id number] [place] [address]         Current Medications:     Current Facility-Administered Medications:     aluminum & magnesium hydroxide-simethicone (MAALOX) 142-090-89 MG/5ML suspension 30 mL, 30 mL, Oral, Q6H PRN, Markel Paula MD    atorvastatin (LIPITOR) tablet 40 mg, 40 mg, Oral, Nightly, Markel Paula MD, 40 mg at 09/09/22 2156    docusate sodium (COLACE) capsule 100 mg, 100 mg, Oral, Daily, Cristopher Don MD, 100 mg at 09/10/22 1056    enoxaparin (LOVENOX) injection 40 mg, 40 mg, SubCUTAneous, Daily, Cristopher Don MD, 40 mg at 09/10/22 1056    FLUoxetine (PROZAC) capsule 80 mg, 80 mg, Oral, Daily, Cristopher Don MD, 80 mg at 09/10/22 1056    glucagon (rDNA) injection 1 mg, 1 mg, SubCUTAneous, PRN, Cristopher Don MD    glucose chewable tablet 16 g, 4 tablet, Oral, PRN, Cristopher Don MD    insulin glargine (LANTUS) injection vial 30 Units, 30 Units, SubCUTAneous, Nightly, Markel Paula MD, 30 Units at 09/09/22 2157    insulin lispro (HUMALOG) injection vial 0-16 Units, 0-16 Units, SubCUTAneous, TID WC, Cristopher Don MD    insulin lispro (HUMALOG) injection vial 0-4 Units, 0-4 Units, SubCUTAneous, Nightly, Markel Paula MD    losartan (COZAAR) tablet 25 mg, 25 mg, Oral, Daily, David Bassett MD, 25 mg at 09/10/22 1056    metoprolol succinate (TOPROL XL) extended release tablet 25 mg, 25 mg, Oral, Daily, Markel Paula MD, 25 mg at 09/10/22 1056    sucralfate (CARAFATE) tablet 1 g, 1 g, Oral, 2 times per day, Cristopher Don MD, 1 g at 09/10/22 1056    sodium chloride flush 0.9 % injection 5-40 mL, 5-40 mL, IntraVENous, PRN, Cristopher Don MD    morphine sulfate (PF) injection 1 mg, 1 mg, IntraVENous, Q2H PRN **OR** morphine sulfate (PF) injection 2 mg, 2 mg, IntraVENous, Q2H PRN, Markel Paula MD    ondansetron (ZOFRAN-ODT) disintegrating tablet 4 mg, 4 mg, Oral, Q8H PRN **OR** ondansetron (ZOFRAN) injection 4 mg, 4 mg, IntraVENous, Q6H PRN, Jos Epps MD    oxyCODONE (ROXICODONE) immediate release tablet 5 mg, 5 mg, Oral, Q4H PRN, 5 mg at 09/10/22 1103 **OR** oxyCODONE HCl (OXY-IR) immediate release tablet 10 mg, 10 mg, Oral, Q4H PRN, Markle Paula MD    pantoprazole (PROTONIX) tablet 20 mg, 20 mg, Oral, QAM AC, Markel Paula MD, 20 mg at 09/10/22 0533    dextrose 10 % infusion, , IntraVENous, Continuous PRN, Jos Epps MD, Stopped at 09/09/22 1908    dextrose bolus 10% 125 mL, 125 mL, IntraVENous, PRN **OR** dextrose bolus 10% 250 mL, 250 mL, IntraVENous, PRN, Markel Paula MD    polyethylene glycol (GLYCOLAX) packet 17 g, 17 g, Oral, Daily PRN, REDD Maxwell MD    loperamide (IMODIUM) capsule 2 mg, 2 mg, Oral, 4x Daily PRN, REDD Maxwell MD, 2 mg at 09/09/22 0221    hydrOXYzine HCl (ATARAX) tablet 10 mg, 10 mg, Oral, TID PRN, Ryan Constantino MD, 10 mg at 09/09/22 0223    cefepime (MAXIPIME) 2000 mg IVPB minibag, 2,000 mg, IntraVENous, Q12H, REDD Maxwell MD, Stopped at 09/10/22 0655    vancomycin (VANCOCIN) 1,250 mg in dextrose 5 % 250 mL IVPB ([id number]), 1,250 mg, IntraVENous, Q12H, REDD Maxwell MD, Stopped at 09/10/22 0730    clindamycin (CLEOCIN) 900 mg in dextrose 5 % 50 mL IVPB, 900 mg, IntraVENous, Q8H, REDD Maxwell MD, Stopped at 09/10/22 0322    Family History:   Family History   Problem Relation Age of Onset    Heart Disease Father     Diabetes Father     Diabetes Mother     Heart Disease Mother     Other Mother     Kidney Disease Mother     Heart Attack Mother        Exam:    Blood pressure (!) 153/90, pulse 73, temperature 98.2 °F (36.8 °C), resp. rate 16, height 6' (1.829 m), weight 171 lb 4.8 oz (77.7 kg), SpO2 98 %. General: Patient was seen sitting up in bed. He was alert and talkative. Preoccupied with pain in his right stump. HEENT: Gazing right and left. MMM. Neck supple. No JVD.     Pulmonary: Shallow respirations without wheezes or rales. Cardiac: Regular rate and rhythm. Abdomen: Patient's abdomen was soft and nondistended. Bowel sounds were present throughout. There was no rebound, guarding or masses noted. Spinal exam: Prominent bony landmarks with pink skin but no open areas or gross malalignment. Upper extremities: Patient was able to bring both hands up to his chin. He lacks reflexes in the upper limbs. Sensation was diminished in the hands.  strength was fair at best.    Lower extremities: His right BKA stump is dressed and tender and swollen. The dressing was dry. His left foot wound was wrapped and dry as well but very tender. The left heel was clear and the calf was soft. Give way with any MMT. Sitting balance was fair. Standing balance was poor. Lab Results   Component Value Date    WBC 11.6 (H) 09/08/2022    HGB 11.8 (L) 09/08/2022    HCT 36.9 (L) 09/08/2022    MCV 91.1 09/08/2022     (H) 09/08/2022     Lab Results   Component Value Date    INR 0.89 05/20/2022    INR 1.14 04/19/2022    INR 1.27 06/12/2021    PROTIME 11.5 (L) 05/20/2022    PROTIME 14.7 (H) 04/19/2022    PROTIME 15.4 (H) 06/12/2021     Lab Results   Component Value Date    CREATININE 1.7 (H) 09/10/2022    BUN 7 09/06/2022     09/06/2022    K 3.7 09/06/2022     09/06/2022    CO2 30 09/06/2022     Lab Results   Component Value Date    ALT 15 09/04/2022    AST 24 09/04/2022    ALKPHOS 73 09/04/2022    BILITOT 0.3 09/04/2022         Impression: 49-year-old male with a history of diabetes, neuropathy, hypertension and dysvascular complications in his limbs with prior left great toe amputation and now new right BK amputation. He is recovering from pneumatosis coli and struggles with depression and anxiety. His pain is poorly controlled. Strengths for the patient: His young age, his supportive family and some experience with adaptive equipment.     Limitations/barriers for the patient: His nonweightbearing through the right, the wound on his left foot and his risk for further dysvascular complications. Recommendation: Acute inpatient rehabilitation with occupational and physical therapy 180 minutes 5 out of every 7 days. Will address basic and  advancing mobility with self-care instruction and adaptive equipment training. Caregiver education will be offered. Expected length of stay  prior to a supervised level of function for discharge home with a walker and [facility] OT/PT is 2 weeks. Additional recommendation:    Necrotizing fasciitis of the right lower limb with amputation: The patient requires daily occupational and physical therapy. He is on IV Maxipime, Cleocin and vancomycin for at least 10 more days. The original discharge readmit orders from the hospitalist did not include these antibiotics. When I checked with discharging hospitalist, she indicated that the antibiotic should be continuing despite the lack of their presence on the transfer orders. He needs aggressive pulm management, wound care and DVT prophylaxis. We must provide instruction for self-care activities and mobility with techniques for no weightbearing through the right lower limb. Caregiver training will be offered. He needs aggressive pulmonary hygiene measures, nutritional support and wound care of the left foot. Outpatient follow-up with his surgeon and PCP. DVT prophylaxis: Lovenox 40 mg subcu daily. I must monitor his hemoglobin and platelet count while on this medication. Weightbearing activities will be pursued through the left foot daily. GI prophylaxis is offered. Uncontrolled pain: Injectable morphine and oral oxycodone are available as needed. Acetaminophen. Believe would be a candidate for gabapentin if he does not have any historical intolerance for that medicine. Bowel intervention while on narcotics. Limb elevation and progressive mobilization. Necrotizing fasciitis: IV antibiotics as mentioned above. Monitoring him for signs of further soft tissue compromise. Monitoring his fever curve and for any leukocytosis. Uncontrolled diabetes type 2 with peripheral neuropathy: Patient requires a diet modified for carbohydrates. He is on scheduled Lantus and Humalog sliding scale. Blood sugars are checked at mealtime and bedtime. Hypertension: Cozaar and Toprol for blood pressure regulation. Vital signs are checked at rest and with activity. Target systolic blood pressures 051-262. Pneumatosis coli: The prescribed oral antibiotics last week or being covered with the injectable medications now. Monitoring him for GI symptoms. Outpatient follow-up with GI. Left foot diabetic ulcer: Wound care team consulted to manage local wound care. Monitoring drainage and tenderness. Weightbearing through the left heel and plantar surface for now. Depression with anxiety: Sleep regulation, pain management and treatment in a calm and consistent environment. Caregiver training and involved in the planning for discharge. I personally performed a history and physical on this patient within 24 hours of admission to the rehab unit. I have reviewed the preadmission screening and concur with its findings without change. A detailed plan of care will be established by hospital day 4 and I attest the patient is appropriate for inpatient rehabilitation at this time. I have compared the patient's current functional status noted during my history and physical with that of the preadmission screen and I have found no significant differences.

## 2022-09-10 NOTE — FLOWSHEET NOTE
[x] daily progress note       [] discharge       Patient Name:  Stefanie Camargo   :  1980 MRN: [id number]  Room:  [address] [place], [place] [address]A Date of Admission: 2022  Rehabilitation Diagnosis:   Necrotizing fasciitis [M72.6]  Necrotizing fasciitis ([address] [M72.6]       Date 9/10/2022       Day of ARU Week:  3   Time IN/-930   Individual Tx Minutes 60   TOTAL Tx Time Mins 60   Restrictions Restrictions/Precautions  Restrictions/Precautions: General Precautions, Fall Risk, Weight Bearing  Required Braces or Orthoses?: Yes (Pt with HARSHAL Willoughby.)  Position Activity Restriction  Other position/activity restrictions: IV access to BUEs   Communication with other providers: [x]   OK to see per nursing:     []   Spoke with team member regarding:      Subjective observations and cognitive status: Pt was sitting up in bed at beginning of treatment session. Pt was alert and agreeable to session. Pain level/location:    0/10       Location:    Discharge recommendations  Anticipated discharge date:  TBD  Destination: []home alone   []home alone with assist PRN     [] home w/ family      [] Continuous supervision  []SNF    [] Assisted living     [] Other:   Continued therapy: []C PT  []OUTPATIENT  PT   [] No Further PT  Equipment needs: TBD         Bed Mobility:           [x]   Pt received out of bed   Scooting EOB:  independent    Transfers:    Sit--> Stand:  CGA  Stand --> Sit:   CGA  Assistive device required for transfer:   2WW    Gait:    Distance:  20'+105'+56'+62'+36'  Assistance:  CGA  Device:  2ww  Gait Quality:  slow, steady hop to gait pattern      Additional Therapeutic activities/exercises completed this date:     []   Nu-step:  Time:        Level:         #Steps:       []   Rebounder:    []  Seated     []  Standing        [x]   Balance training: Pt stood in 2WW w/ CGA for disc throw to improve dynamic stability reaching at various heights outside of MELANIE.           []   Postural training    []   Supine ther ex (reps/sets):     [x]   Seated ther ex (reps/sets): 2 x 10 L heel raises, toe raises, marching and LAQ's for strengthening. []   Standing ther ex (reps/sets):     []   Picking up object from floor (standing):                   []   Reacher used   []   Other:   []   Other:    Patient/Caregiver Education and Training:   [x]   Bed Mobility/Transfer technique/safety  [x]   Gait technique/sequencing  [x]   Proper use of assistive device  []   Advanced mobility safety and technique  []   Reinforced patient's precautions/mobility while maintaining precautions  []   Postural awareness  []   Family training  []   Progress was updated and reviewed in Rehabtracker with patient and/or family this date. Treatment Plan for Next Session: gait, advanced gait, curb step in // bars, balance      Assessment: This pt demonstrated a positive response to today's treatment as evidenced by improved gait distance. The patient is making Good progress toward established goals as evidenced by QI scores. Ongoing deficits are observed in the areas of endurance,balance and strength and continued focus on these is recommended. Treatment/Activity Tolerance:   [x] Tolerated treatment with no adverse effects    [] Patient limited by fatigue  [] Patient limited by pain   [] Patient limited by medical complications:    [] Adverse reaction to Tx:   [] Significant change in status    Safety:       [x]  bed alarm set    []  chair alarm set    []  Pt refused alarms                []  Telesitter activated      [x]  Gait belt used during tx session      [x]other: Pt left sitting up in bed w/ call light at end of treatment session.         Number of Minutes/Billable Intervention  Gait Training 30   Therapeutic Exercise 10   Neuro Re-Ed    Therapeutic Activity 20   Wheelchair Propulsion    Group    Other:    TOTAL 60         Social History  Social/Functional History  Lives With: Alone  Type of Home: House  Home Layout: Two level, Able to Live on Main level with bedroom/bathroom  Home Access: Ramped entrance  Bathroom Shower/Tub: Tub/Shower unit (TTB)  Bathroom Toilet: Standard  Bathroom Equipment: Tub transfer bench  Bathroom Accessibility: Accessible  Home Equipment: melissa Baker Devonne Ravens  Has the patient had two or more falls in the past year or any fall with injury in the past year?: Yes (Pt with 3-4 falls in the last year with no significant injuries.)  ADL Assistance: Independent  Homemaking Assistance: Independent  Homemaking Responsibilities: Yes  Meal Prep Responsibility: Primary  Laundry Responsibility: Primary  Cleaning Responsibility: Primary  Bill Paying/Finance Responsibility: Primary  Shopping Responsibility: Primary  Dependent Care Responsibility: No  Health Care Management: Primary  Ambulation Assistance: Independent (Pt has been using 2WW for the last couple months and manual w/c for a couple days prior to admission.)  Transfer Assistance: Independent  Active : Yes  Mode of Transportation: Car  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 Pt with 3-4 falls in the last year.     Objective                                                                                    Goals:  (Update in navigator)  Short Term Goals  Time Frame for Short term goals: 10-12 days STG=LTG  Short term goal 1: Pt will perform sit to stand, w/c<>bed and car transfers with mod I  Short term goal 2: Pt will ambulate 48' with 2ww onlevel surfaces with mod I, 10' on unlevel surface with supervision  Short term goal 3: Pt will ascend/descend 2\" step with 2ww with supervision  Short term goal 4: Pt will  light object with 2ww and reacher with CGA  Short term goal 5: Pt will perform w/c mobility in household setting with mod I:   :        Plan of Care                                                                              Times per week: 5 days per week for a minimum of 60 minutes/day plus group as appropriate for 60 minutes.   Treatment to include Current Treatment Recommendations: Strengthening, ROM, Balance training, Functional mobility training, Transfer training, ADL/Self-care training, IADL training, Cognitive/Perceptual training, Endurance training, Wheelchair mobility training, Gait training, Neuromuscular re-education, Safety education & training, Therapeutic activities, Patient/Caregiver education & training, Equipment evaluation, education, & procurement, Positioning, Home exercise program, Pain management, Stair training    Electronically signed by   Debi Lemus PTA, [id number]   9/10/2022, 8:20 AM

## 2022-09-11 LAB
ALBUMIN SERPL-MCNC: 3.1 GM/DL (ref 3.4–5)
ANION GAP SERPL CALCULATED.3IONS-SCNC: 8 MMOL/L (ref 4–16)
BUN BLDV-MCNC: 14 MG/DL (ref 6–23)
CALCIUM SERPL-MCNC: 8.4 MG/DL (ref 8.3–10.6)
CHLORIDE BLD-SCNC: 98 MMOL/L (ref 99–110)
CO2: 30 MMOL/L (ref 21–32)
CREAT SERPL-MCNC: 1.9 MG/DL (ref 0.9–1.3)
DOSE AMOUNT: NORMAL
DOSE TIME: NORMAL
GFR AFRICAN AMERICAN: 48 ML/MIN/1.73M2
GFR NON-AFRICAN AMERICAN: 39 ML/MIN/1.73M2
GLUCOSE BLD-MCNC: 107 MG/DL (ref 70–99)
GLUCOSE BLD-MCNC: 111 MG/DL (ref 70–99)
GLUCOSE BLD-MCNC: 164 MG/DL (ref 70–99)
GLUCOSE BLD-MCNC: 197 MG/DL (ref 70–99)
GLUCOSE BLD-MCNC: 223 MG/DL (ref 70–99)
PHOSPHORUS: 5 MG/DL (ref 2.5–4.9)
POTASSIUM SERPL-SCNC: 3.8 MMOL/L (ref 3.5–5.1)
SODIUM BLD-SCNC: 136 MMOL/L (ref 135–145)
VANCOMYCIN RANDOM: 16.8 UG/ML

## 2022-09-11 PROCEDURE — 80069 RENAL FUNCTION PANEL: CPT

## 2022-09-11 PROCEDURE — 2580000003 HC RX 258: Performed by: PHYSICAL MEDICINE & REHABILITATION

## 2022-09-11 PROCEDURE — 2500000003 HC RX 250 WO HCPCS: Performed by: PHYSICAL MEDICINE & REHABILITATION

## 2022-09-11 PROCEDURE — 6360000002 HC RX W HCPCS: Performed by: PHYSICAL MEDICINE & REHABILITATION

## 2022-09-11 PROCEDURE — 1280000000 HC REHAB R&B

## 2022-09-11 PROCEDURE — 6370000000 HC RX 637 (ALT 250 FOR IP): Performed by: STUDENT IN AN ORGANIZED HEALTH CARE EDUCATION/TRAINING PROGRAM

## 2022-09-11 PROCEDURE — 94761 N-INVAS EAR/PLS OXIMETRY MLT: CPT

## 2022-09-11 PROCEDURE — 94664 DEMO&/EVAL PT USE INHALER: CPT

## 2022-09-11 PROCEDURE — 6360000002 HC RX W HCPCS: Performed by: STUDENT IN AN ORGANIZED HEALTH CARE EDUCATION/TRAINING PROGRAM

## 2022-09-11 PROCEDURE — 80202 ASSAY OF VANCOMYCIN: CPT

## 2022-09-11 PROCEDURE — 94150 VITAL CAPACITY TEST: CPT

## 2022-09-11 PROCEDURE — 36415 COLL VENOUS BLD VENIPUNCTURE: CPT

## 2022-09-11 PROCEDURE — 82962 GLUCOSE BLOOD TEST: CPT

## 2022-09-11 RX ADMIN — CEFEPIME HYDROCHLORIDE 2000 MG: 2 INJECTION, POWDER, FOR SOLUTION INTRAVENOUS at 18:34

## 2022-09-11 RX ADMIN — ENOXAPARIN SODIUM 40 MG: 40 INJECTION SUBCUTANEOUS at 09:57

## 2022-09-11 RX ADMIN — VANCOMYCIN HYDROCHLORIDE 1250 MG: 1.25 INJECTION, POWDER, LYOPHILIZED, FOR SOLUTION INTRAVENOUS at 10:20

## 2022-09-11 RX ADMIN — ATORVASTATIN CALCIUM 40 MG: 40 TABLET, FILM COATED ORAL at 20:14

## 2022-09-11 RX ADMIN — SUCRALFATE 1 G: 1 TABLET ORAL at 20:14

## 2022-09-11 RX ADMIN — OXYCODONE HYDROCHLORIDE 10 MG: 10 TABLET ORAL at 16:23

## 2022-09-11 RX ADMIN — CLINDAMYCIN IN 5 PERCENT DEXTROSE 900 MG: 18 INJECTION, SOLUTION INTRAVENOUS at 13:03

## 2022-09-11 RX ADMIN — METOPROLOL SUCCINATE 25 MG: 25 TABLET, EXTENDED RELEASE ORAL at 09:57

## 2022-09-11 RX ADMIN — PANTOPRAZOLE SODIUM 20 MG: 20 TABLET, DELAYED RELEASE ORAL at 05:33

## 2022-09-11 RX ADMIN — FLUOXETINE 80 MG: 20 CAPSULE ORAL at 09:57

## 2022-09-11 RX ADMIN — CEFEPIME HYDROCHLORIDE 2000 MG: 2 INJECTION, POWDER, FOR SOLUTION INTRAVENOUS at 05:32

## 2022-09-11 RX ADMIN — DOCUSATE SODIUM 100 MG: 100 CAPSULE, LIQUID FILLED ORAL at 09:56

## 2022-09-11 RX ADMIN — CLINDAMYCIN IN 5 PERCENT DEXTROSE 900 MG: 18 INJECTION, SOLUTION INTRAVENOUS at 20:17

## 2022-09-11 RX ADMIN — LOSARTAN POTASSIUM 25 MG: 25 TABLET, FILM COATED ORAL at 09:57

## 2022-09-11 RX ADMIN — CLINDAMYCIN IN 5 PERCENT DEXTROSE 900 MG: 18 INJECTION, SOLUTION INTRAVENOUS at 01:55

## 2022-09-11 RX ADMIN — SUCRALFATE 1 G: 1 TABLET ORAL at 09:56

## 2022-09-11 RX ADMIN — INSULIN GLARGINE 30 UNITS: 100 INJECTION, SOLUTION SUBCUTANEOUS at 20:46

## 2022-09-11 ASSESSMENT — PAIN DESCRIPTION - ORIENTATION: ORIENTATION: RIGHT

## 2022-09-11 ASSESSMENT — PAIN SCALES - GENERAL: PAINLEVEL_OUTOF10: 10

## 2022-09-11 ASSESSMENT — PAIN DESCRIPTION - LOCATION: LOCATION: LEG

## 2022-09-11 ASSESSMENT — PAIN - FUNCTIONAL ASSESSMENT: PAIN_FUNCTIONAL_ASSESSMENT: PREVENTS OR INTERFERES SOME ACTIVE ACTIVITIES AND ADLS

## 2022-09-11 ASSESSMENT — PAIN DESCRIPTION - DESCRIPTORS: DESCRIPTORS: SHARP;STABBING

## 2022-09-11 NOTE — PROGRESS NOTES
[facility]  consulted by Dr. Rob Avila for monitoring and adjustment. Indication for treatment: SSTI  Goal trough: [x] 10-15 mcg/mL or [] 15-20 mcg/ml  AUC/LIDIA: [x] <500 or [] 400-600    Pertinent Laboratory Values:   Temp Readings from Last 3 Encounters:   09/11/22 98.1 °F (36.7 °C) (Oral)   09/08/22 98.3 °F (36.8 °C) (Oral)   09/02/22 98.6 °F (37 °C) (Oral)     No results for input(s): WBC, LACTATE in the last 72 hours. Recent Labs     09/10/22  0134 09/11/22  0724   BUN  --  14   CREATININE 1.7* 1.9*       Estimated Creatinine Clearance: 56 mL/min (A) (based on SCr of 1.9 mg/dL (H)).     Intake/Output Summary (Last 24 hours) at 9/11/2022 1307  Last data filed at 9/11/2022 0849  Gross per 24 hour   Intake 837 ml   Output 1075 ml   Net -238 ml         Pertinent Cultures:  Date    Source    Results  9/02   Blood x 2   NGTD      Vancomycin level:   RANDOM:    Recent Labs     09/11/22  0724   VANCORANDOM 16.8       Assessment:  Renal function:   GASTON, previous Scr in range 0.6-0.7  Scr with continued increase to 1.9  Repeat renal function panel tomorrow AM to continue trending  S/p right leg guillotine BKA  Day(s) of therapy: 3 of 10 (continued from inpatient)  Vancomycin level:   9/3: 10.3 (12 hours post dose) (therapeutic)  9/6: 18.8 ()  9/11: 16.8, 26h post-dose, therapeutic but above desired goal for indication    Plan:  Adjust vancomycin from 1250 mg q24h scheduled to intermittent dosing based on levels due to worsening renal trends  Repeat a random vancomycin level and renal function panel tomorrow AM  Re-dose as appropriate per level  Pharmacy will continue to monitor patient and adjust therapy as appropriate    Abram 3 9/12 @0600    Thank you for the consult,  Merissa Sheppard, [facility], PharmD

## 2022-09-11 NOTE — CONSULTS
Consult completed. Nexiva 20g 1.75 inch catheter inserted via ultrasound in patient's RFA. Brisk blood return noted and catheter flushes with ease. Patient tolerated well. Deion Batista, primary RN notified. Consult IV/PICC team if patient's needs change.

## 2022-09-12 ENCOUNTER — CARE COORDINATION (OUTPATIENT)
Dept: CARE COORDINATION | Age: 42
End: 2022-09-12

## 2022-09-12 LAB
ALBUMIN SERPL-MCNC: 3.1 GM/DL (ref 3.4–5)
ANION GAP SERPL CALCULATED.3IONS-SCNC: 7 MMOL/L (ref 4–16)
BUN BLDV-MCNC: 14 MG/DL (ref 6–23)
CALCIUM SERPL-MCNC: 8.7 MG/DL (ref 8.3–10.6)
CHLORIDE BLD-SCNC: 99 MMOL/L (ref 99–110)
CO2: 30 MMOL/L (ref 21–32)
CREAT SERPL-MCNC: 1.7 MG/DL (ref 0.9–1.3)
DOSE AMOUNT: NORMAL
DOSE TIME: NORMAL
GFR AFRICAN AMERICAN: 54 ML/MIN/1.73M2
GFR NON-AFRICAN AMERICAN: 45 ML/MIN/1.73M2
GLUCOSE BLD-MCNC: 130 MG/DL (ref 70–99)
GLUCOSE BLD-MCNC: 133 MG/DL (ref 70–99)
GLUCOSE BLD-MCNC: 141 MG/DL (ref 70–99)
GLUCOSE BLD-MCNC: 177 MG/DL (ref 70–99)
GLUCOSE BLD-MCNC: 178 MG/DL (ref 70–99)
PHOSPHORUS: 4.7 MG/DL (ref 2.5–4.9)
POTASSIUM SERPL-SCNC: 3.7 MMOL/L (ref 3.5–5.1)
SODIUM BLD-SCNC: 136 MMOL/L (ref 135–145)
VANCOMYCIN RANDOM: 21.1 UG/ML

## 2022-09-12 PROCEDURE — 1280000000 HC REHAB R&B

## 2022-09-12 PROCEDURE — 2500000003 HC RX 250 WO HCPCS: Performed by: PHYSICAL MEDICINE & REHABILITATION

## 2022-09-12 PROCEDURE — 80069 RENAL FUNCTION PANEL: CPT

## 2022-09-12 PROCEDURE — 6360000002 HC RX W HCPCS: Performed by: STUDENT IN AN ORGANIZED HEALTH CARE EDUCATION/TRAINING PROGRAM

## 2022-09-12 PROCEDURE — 6360000002 HC RX W HCPCS: Performed by: PHYSICAL MEDICINE & REHABILITATION

## 2022-09-12 PROCEDURE — 94761 N-INVAS EAR/PLS OXIMETRY MLT: CPT

## 2022-09-12 PROCEDURE — 97110 THERAPEUTIC EXERCISES: CPT

## 2022-09-12 PROCEDURE — 2580000003 HC RX 258: Performed by: PHYSICAL MEDICINE & REHABILITATION

## 2022-09-12 PROCEDURE — 36415 COLL VENOUS BLD VENIPUNCTURE: CPT

## 2022-09-12 PROCEDURE — 80202 ASSAY OF VANCOMYCIN: CPT

## 2022-09-12 PROCEDURE — 99232 SBSQ HOSP IP/OBS MODERATE 35: CPT | Performed by: PHYSICAL MEDICINE & REHABILITATION

## 2022-09-12 PROCEDURE — 97150 GROUP THERAPEUTIC PROCEDURES: CPT

## 2022-09-12 PROCEDURE — 97530 THERAPEUTIC ACTIVITIES: CPT

## 2022-09-12 PROCEDURE — 6370000000 HC RX 637 (ALT 250 FOR IP): Performed by: STUDENT IN AN ORGANIZED HEALTH CARE EDUCATION/TRAINING PROGRAM

## 2022-09-12 PROCEDURE — 97116 GAIT TRAINING THERAPY: CPT

## 2022-09-12 PROCEDURE — 2580000003 HC RX 258: Performed by: STUDENT IN AN ORGANIZED HEALTH CARE EDUCATION/TRAINING PROGRAM

## 2022-09-12 RX ADMIN — OXYCODONE HYDROCHLORIDE 10 MG: 10 TABLET ORAL at 19:56

## 2022-09-12 RX ADMIN — PANTOPRAZOLE SODIUM 20 MG: 20 TABLET, DELAYED RELEASE ORAL at 06:20

## 2022-09-12 RX ADMIN — CLINDAMYCIN IN 5 PERCENT DEXTROSE 900 MG: 18 INJECTION, SOLUTION INTRAVENOUS at 05:14

## 2022-09-12 RX ADMIN — OXYCODONE HYDROCHLORIDE 5 MG: 5 TABLET ORAL at 09:06

## 2022-09-12 RX ADMIN — SUCRALFATE 1 G: 1 TABLET ORAL at 09:07

## 2022-09-12 RX ADMIN — SODIUM CHLORIDE, PRESERVATIVE FREE 10 ML: 5 INJECTION INTRAVENOUS at 09:11

## 2022-09-12 RX ADMIN — CEFEPIME HYDROCHLORIDE 2000 MG: 2 INJECTION, POWDER, FOR SOLUTION INTRAVENOUS at 06:20

## 2022-09-12 RX ADMIN — SODIUM CHLORIDE, PRESERVATIVE FREE 10 ML: 5 INJECTION INTRAVENOUS at 19:53

## 2022-09-12 RX ADMIN — DOCUSATE SODIUM 100 MG: 100 CAPSULE, LIQUID FILLED ORAL at 09:07

## 2022-09-12 RX ADMIN — OXYCODONE HYDROCHLORIDE 10 MG: 10 TABLET ORAL at 14:28

## 2022-09-12 RX ADMIN — METOPROLOL SUCCINATE 25 MG: 25 TABLET, EXTENDED RELEASE ORAL at 09:08

## 2022-09-12 RX ADMIN — ATORVASTATIN CALCIUM 40 MG: 40 TABLET, FILM COATED ORAL at 19:56

## 2022-09-12 RX ADMIN — ENOXAPARIN SODIUM 40 MG: 40 INJECTION SUBCUTANEOUS at 09:08

## 2022-09-12 RX ADMIN — FLUOXETINE 80 MG: 20 CAPSULE ORAL at 09:07

## 2022-09-12 RX ADMIN — LOSARTAN POTASSIUM 25 MG: 25 TABLET, FILM COATED ORAL at 09:08

## 2022-09-12 RX ADMIN — SUCRALFATE 1 G: 1 TABLET ORAL at 19:56

## 2022-09-12 RX ADMIN — INSULIN GLARGINE 30 UNITS: 100 INJECTION, SOLUTION SUBCUTANEOUS at 20:00

## 2022-09-12 RX ADMIN — CLINDAMYCIN IN 5 PERCENT DEXTROSE 900 MG: 18 INJECTION, SOLUTION INTRAVENOUS at 12:29

## 2022-09-12 ASSESSMENT — PAIN DESCRIPTION - LOCATION
LOCATION_2: LEG
LOCATION: HEAD
LOCATION: HEAD
LOCATION: LEG
LOCATION: LEG

## 2022-09-12 ASSESSMENT — PAIN SCALES - GENERAL
PAINLEVEL_OUTOF10: 7
PAINLEVEL_OUTOF10: 5
PAINLEVEL_OUTOF10: 0
PAINLEVEL_OUTOF10: 1
PAINLEVEL_OUTOF10: 1
PAINLEVEL_OUTOF10: 9
PAINLEVEL_OUTOF10: 8

## 2022-09-12 ASSESSMENT — PAIN DESCRIPTION - DESCRIPTORS
DESCRIPTORS: ACHING
DESCRIPTORS: ACHING;SHOOTING
DESCRIPTORS_2: ACHING
DESCRIPTORS: ACHING
DESCRIPTORS: ACHING;PRESSURE;SORE

## 2022-09-12 ASSESSMENT — PAIN DESCRIPTION - PAIN TYPE
TYPE: ACUTE PAIN
TYPE: ACUTE PAIN

## 2022-09-12 ASSESSMENT — PAIN DESCRIPTION - ORIENTATION
ORIENTATION: RIGHT
ORIENTATION: RIGHT
ORIENTATION_2: RIGHT

## 2022-09-12 ASSESSMENT — PAIN DESCRIPTION - INTENSITY: RATING_2: 5

## 2022-09-12 ASSESSMENT — PAIN DESCRIPTION - FREQUENCY: FREQUENCY: CONTINUOUS

## 2022-09-12 NOTE — PROGRESS NOTES
Physical Rehabilitation: OCCUPATIONAL THERAPY     [x] daily progress note       [] discharge       Patient Name:  Nadeen Owens   :  1980 MRN: [id number]  Room:  [address] [place], [place] [address] Date of Admission: 2022    Rehabilitation Diagnosis:     Necrotizing fasciitis [M72.6]  Necrotizing fasciitis ([address] [M72.6]    Social History  Social/Functional History  Lives With: Alone  Type of Home: House  Home Layout: Two level, Able to Live on Main level with bedroom/bathroom  Home Access: Ramped entrance  Bathroom Shower/Tub: Tub/Shower unit (TTB)  Bathroom Toilet: Standard  Bathroom Equipment: Tub transfer bench  Bathroom Accessibility: Accessible  Home Equipment: Tonye Auburn, rolling, Natividad Marines  Has the patient had two or more falls in the past year or any fall with injury in the past year?: Yes (Pt with 3-4 falls in the last year with no significant injuries.)  ADL Assistance: Independent  Homemaking Assistance: Independent  Homemaking Responsibilities: Yes  Meal Prep Responsibility: Primary  Laundry Responsibility: Primary  Cleaning Responsibility: Primary  Bill Paying/Finance Responsibility: Primary  Shopping Responsibility: Primary  Dependent Care Responsibility: No  Health Care Management: Primary  Ambulation Assistance: Independent (Pt has been using 2WW for the last couple months and manual w/c for a couple days prior to admission.)  Transfer Assistance: Independent  Active : Yes  Mode of Transportation: Car  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 Pt with 3-4 falls in the last year. Objective                                                                                    Goals:  (Update in navigator)    Short Term Goals  Time Frame for Short term goals: STGs=LTGs :  Long Term Goals  Time Frame for Long term goals : 10 days or until d/c.   Long Term Goal 1: Pt will complete oral care and grooming tasks c Mod I.  Long Term Goal 2: Pt will complete total body bathing c AE PRN and Mod I.  Long Term Goal 3: Pt will complete UB dressing c IND. Long Term Goal 4: Pt will complete LB dressing c AE PRN and Mod I.  Long Term Goal 5: Pt will doff/don footwear to L foot c AE PRN and Mod I. Additional Goals?: Yes  Long Term Goal 6: Pt will complete toileting c Mod I.  Long Term Goal 7: Pt will perform functional transfers (bed, chair, toilet, shower) c DME PRN and Mod I.  Long Term Goal 8: Pt will perform therex/therax to facilitate an increase in strength/endurance (c emphasis on dynamic standing balance/tolerance > 8 mins) c Mod I.  Long Term Goal 9: Pt will complete home management tasks c Mod I. :    Plan of Care:  Pt to be seen at least  5x per week for a minimum of 60 minutes as appropriate. Date  9/12/2022   TIMES IN/OUT   1030/1130   Group Tx Minutes 60   TOTAL Tx time seen 60   Variance Time    Variance Reason    [] Refusal due to   [] Medical hold/reason  [] Illness   [] Off Unit for test/procedure  [] Extra time needed to complete task  [] Other (specify)   Restrictions/Precautions Restrictions/Precautions  Restrictions/Precautions: General Precautions, Fall Risk, Weight Bearing  Required Braces or Orthoses?: Yes (Pt with HARSHAL Willoughby.)     Position Activity Restriction  Other position/activity restrictions: IV access to BUEs   Current Diet/Swallowing Issues ADULT DIET; Regular; 4 carb choices (60 gm/meal)  ADULT ORAL NUTRITION SUPPLEMENT; Breakfast, Lunch, Dinner; Diabetic Oral Supplement   Communication with other providers: [x] Ok to see per nursing at morning huddle  [] Medical hold and reason  [] Spoke with (team    member) regarding   Subjective observations: Pt agreeable to group session. Pain level/location:    Alarm placed/where?   Fall Risk  [] Pt taken to DR for lunch with nsg present   [] Pt taken back to room with chair/bed alarm in place         Group Activity: Total time in group = 60min   Jeff RDA Howard participated in exercise and discussion on benefits and precautions during exercise. This provided the opportunity to have fun, build camaraderie, increase endurance, improve breathing techniques, balance, and strength. Vincenzo Hodgkin performed a variety of seated and standing activities as able, with focus on technique and safety during exercise. Also focused on warm-up, warm-down, endurance monitoring, strengthening & strategies, function, safety, and general social & communication opportunities with other group members.       Functional areas targeted:   [x] Communication [x] Memory  [x] Coordination    [] Kitchen Safety [x] Problem Solving  [x] Strengthening     [x] Attention  [x] Endurance   [] Mobility    [x] Awareness/Insight [] Balance  [] Transfers  [] Social/Pragmatics [] Sequencing  [] Equipment Use    [] Safety   [] Other (specify)     Participation:  [x] Actively participated        Assessment / Impression                                                          Treatment/Activity Tolerance:   [x] Tolerated Treatment well:     [] Patient limited by fatigue/pain:       [] Patient limited by medical complications:    [] Adverse Reaction to Tx:   [] Significant change in status    Number of Minutes/Billable Intervention  Therapeutic Exercise    ADL Self-care    Neuro Re-Ed    Therapeutic Activity    Group 60   Other:    TOTAL 60     Electronically signed by   HEMANT Crawford,    9/12/2022, 8:20 AM

## 2022-09-12 NOTE — CARE COORDINATION
Received a call from Beata Celeste with [facility].. She stated that the patient has concerns with returning home and that his goal was possible an assisted living in [facility] closer to his brother that lives in [place]. Followed up with patient. He stated that his goal upon discharge is to return home. His plan for [facility] is for the future not current. He needs to sell his home before he can make that move. He has concerns about his fridge not working and cleaning his home. He is able to get around his home at wheelchair level and has a wheelchair he can use. Discussed possible meals on wheels, care star services, and non medical home care services, and Letty Garvey. Patient is onboard with the services he qualifies for. Patient will need transportation home.

## 2022-09-12 NOTE — PROGRESS NOTES
Physical Rehabilitation: OCCUPATIONAL THERAPY     [x] daily progress note       [] discharge       Patient Name:  Anny Fraser   :  1980 MRN: [id number]  Room:  [address] [place], [place] [address] Date of Admission: 2022    Rehabilitation Diagnosis:     Necrotizing fasciitis [M72.6]  Necrotizing fasciitis ([address] [M72.6]    Social History  Social/Functional History  Lives With: Alone  Type of Home: House  Home Layout: Two level, Able to Live on Main level with bedroom/bathroom  Home Access: Ramped entrance  Bathroom Shower/Tub: Tub/Shower unit (TTB)  Bathroom Toilet: Standard  Bathroom Equipment: Tub transfer bench  Bathroom Accessibility: Accessible  Home Equipment: Laura Dunbar, melissa, Abel Arroyo  Has the patient had two or more falls in the past year or any fall with injury in the past year?: Yes (Pt with 3-4 falls in the last year with no significant injuries.)  ADL Assistance: Independent  Homemaking Assistance: Independent  Homemaking Responsibilities: Yes  Meal Prep Responsibility: Primary  Laundry Responsibility: Primary  Cleaning Responsibility: Primary  Bill Paying/Finance Responsibility: Primary  Shopping Responsibility: Primary  Dependent Care Responsibility: No  Health Care Management: Primary  Ambulation Assistance: Independent (Pt has been using 2WW for the last couple months and manual w/c for a couple days prior to admission.)  Transfer Assistance: Independent  Active : Yes  Mode of Transportation: Car  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 Pt with 3-4 falls in the last year. Objective                                                                                    Goals:  (Update in navigator)    Short Term Goals  Time Frame for Short term goals: STGs=LTGs :  Long Term Goals  Time Frame for Long term goals : 10 days or until d/c.   Long Term Goal 1: Pt will complete oral care and grooming tasks c Mod I.  Long Term Goal 2: Pt will complete total body bathing c AE PRN and Mod I.  Long Term Goal 3: Pt will complete UB dressing c IND. Long Term Goal 4: Pt will complete LB dressing c AE PRN and Mod I.  Long Term Goal 5: Pt will doff/don footwear to L foot c AE PRN and Mod I. Additional Goals?: Yes  Long Term Goal 6: Pt will complete toileting c Mod I.  Long Term Goal 7: Pt will perform functional transfers (bed, chair, toilet, shower) c DME PRN and Mod I.  Long Term Goal 8: Pt will perform therex/therax to facilitate an increase in strength/endurance (c emphasis on dynamic standing balance/tolerance > 8 mins) c Mod I.  Long Term Goal 9: Pt will complete home management tasks c Mod I. :    Plan of Care:  Pt to be seen at least  5x per week for a minimum of 60 minutes as appropriate. Date  9/12/2022   TIMES IN/OUT   1030/1130   Group Tx Minutes 60   TOTAL Tx time seen 60   Variance Time    Variance Reason    [] Refusal due to   [] Medical hold/reason  [] Illness   [] Off Unit for test/procedure  [] Extra time needed to complete task  [] Other (specify)   Restrictions/Precautions Restrictions/Precautions  Restrictions/Precautions: General Precautions, Fall Risk, Weight Bearing  Required Braces or Orthoses?: Yes (Pt with HARSHAL Willoughby.)     Position Activity Restriction  Other position/activity restrictions: IV access to BUEs   Current Diet/Swallowing Issues ADULT DIET; Regular; 4 carb choices (60 gm/meal)  ADULT ORAL NUTRITION SUPPLEMENT; Breakfast, Lunch, Dinner; Diabetic Oral Supplement   Communication with other providers: [x] Ok to see per nursing at morning huddle  [] Medical hold and reason  [] Spoke with (team    member) regarding   Subjective observations: Pt agreeable to group session. Pain level/location:    Alarm placed/where?   Fall Risk  [] Pt taken to DR for lunch with nsg present   [x] Pt taken back to room with chair/bed alarm in place         Group Activity: Total time in group = 60min   Jeff RAD Howard participated in exercise and discussion on benefits and precautions during exercise. This provided the opportunity to have fun, build camaraderie, increase endurance, improve breathing techniques, balance, and strength. Cecily Sifuentes performed a variety of seated and standing activities as able, with focus on technique and safety during exercise. Also focused on warm-up, warm-down, endurance monitoring, strengthening & strategies, function, safety, and general social & communication opportunities with other group members.       Functional areas targeted:   [x] Communication [x] Memory  [] Coordination    [] Kitchen Safety [x] Problem Solving  [x] Strengthening     [x] Attention  [] Endurance   [] Mobility    [x] Awareness/Insight [] Balance  [] Transfers  [x] Social/Pragmatics [] Sequencing  [] Equipment Use    [] Safety   [] Other (specify)     Participation:  [x] Actively participated          Assessment / Impression                                                          Treatment/Activity Tolerance:   [x] Tolerated Treatment well:     [] Patient limited by fatigue/pain:       [] Patient limited by medical complications:    [] Adverse Reaction to Tx:   [] Significant change in status    Number of Minutes/Billable Intervention  Therapeutic Exercise    ADL Self-care    Neuro Re-Ed    Therapeutic Activity    Group 60   Other:    TOTAL 60     Electronically signed by   HEMANT Ayala,    9/12/2022, 8:21 AM

## 2022-09-12 NOTE — PROGRESS NOTES
Physical Therapy  [x] daily progress note       [] discharge       Patient Name:  Chapo Mendosa   :  1980 MRN: [id number]  Room:  [address] [place] [address] Date of Admission: 2022  Rehabilitation Diagnosis:   Necrotizing fasciitis [M72.6]  Necrotizing fasciitis ([address] [M72.6]       Date 2022       Day of ARU Week:  5   Time IN/OUT 0830 - 0930 ; 1300 - 1400   Individual Tx Minutes 60 + 60   Group Tx Minutes    Co-Treat Minutes    Concurrent Tx Minutes    TOTAL Tx Time Mins 120   Variance Time    Variance Time []   Refusal due to:     []   Medical hold/reason:    []   Illness   []   Off Unit for test/procedure  []   Extra time needed to complete task  []   Therapeutic need  []   Other (specify):   Restrictions Restrictions/Precautions  Restrictions/Precautions: General Precautions, Fall Risk, Weight Bearing  Required Braces or Orthoses?: Yes (Pt with HARSHAL Willoughby.)  Position Activity Restriction  Other position/activity restrictions: IV access to [place] communication []   Cleared for therapy per nursing     []   RN notified about issues during session  []   RN updated on pt performance  []   Spoke with   []   Spoke with OT  []   Spoke with MD  []   Other:    Subjective observations and cognitive status: \"I feel a lot more confident in that step after practicing\"     Pain level/location:  Denies pain in AM ; reports inc R BKA pain in PM, does not rate   Discharge recommendations  Anticipated discharge date:  TBD  Destination: []home alone   []home alone with assist PRN     [] home w/ family      [] Continuous supervision  []SNF    [] Assisted living     [] Other:  Continued therapy: []HHC PT  []OUTPATIENT PT   [] No Further PT  []SNF PT  Caregiver training recommended: []Yes  [] No   Equipment needs: TBD     PT IRF-DAYNA scores and goals for discharge assessment: (ccQI performed in PM session)  Roll Left and Right  Assistance Needed: Independent  Comment: no rails needed  CARE Score: 6  Discharge Goal: Independent    Sit to Lying  Assistance Needed: Independent  CARE Score: 6  Discharge Goal: Independent    Lying to Sitting on Side of Bed  Assistance Needed: Independent  CARE Score: 6  Discharge Goal: Independent    Sit to Stand  Assistance Needed: Supervision or touching assistance  Comment: sup for up to recliner  CARE Score: 4  Discharge Goal: Independent    Chair/Bed-to-Chair Transfer  Assistance Needed: Supervision or touching assistance  Comment: sup w/ FWW ; inc time and effort  CARE Score: 4  Discharge Goal: Independent    Car Transfer  Assistance Needed: Independent  Comment: mod ind  CARE Score: 6  Discharge Goal: Independent    Walk 10 Feet?   Walk 10 Feet?: Yes    1 Step  1 Step?: Yes  Reason if not Attempted: Not attempted due to medical condition or safety concerns    Picking Up Object  Assistance Needed: Independent  Comment: set-up for object retrieval ; mod ind  CARE Score: 6  Discharge Goal: Supervision or touching assistance    Wheelchair Ability  Uses a Wheelchair and/or Scooter?: Yes    Wheel 50 Feet with Two Turns  Assistance Needed: Independent  Comment: B UE propulsion  CARE Score: 6  Discharge Goal: Independent    Wheel 150 Feet  Assistance Needed: Independent  Comment: B UE propulsion  CARE Score: 6  Discharge Goal: Independent    Walking Ability  Does the Patient Walk?: Yes    Walk 10 Feet  Assistance Needed: Supervision or touching assistance  Comment: SBA w/ FWW ; hop to  CARE Score: 4  Discharge Goal: Independent    Walk 50 Feet with Two Turns  Assistance Needed: Supervision or touching assistance  Comment: sup w/ hop to pattern  CARE Score: 4  Discharge Goal: Independent    Walk 150 Feet  Comment: distance contradictory 2/2 L heel wound w/ impaired healing ; max of 110 ft w/ FWW  Reason if not Attempted: Not attempted due to medical condition or safety concerns  CARE Score: 88  Discharge Goal: Not Attempted    Walking 10 Feet on Uneven Surfaces  Assistance Needed: Supervision or touching assistance  Comment: CGA w/ FWW ; inc time and effort, inferior gaze  CARE Score: 4  Discharge Goal: Supervision or touching assistance    1 Step (Curb)  Assistance Needed: Supervision or touching assistance  Comment: CGA w/ FWW ; mod cues  Reason if not Attempted: Not attempted due to medical condition or safety concerns  CARE Score: 4  Discharge Goal: Supervision or touching assistance  -Performed on 9/12    4 Steps  Comment: curb step only ; unsafe for performance and contraindicated 2/2 wound healing  Reason if not Attempted: Not attempted due to medical condition or safety concerns  CARE Score: 88  Discharge Goal: Not Attempted    12 Steps  Comment: curb step only ; unsafe for performance and contraindicated 2/2 wound healing  Reason if not Attempted: Not attempted due to medical condition or safety concerns  CARE Score: 88  Discharge Goal: Not Attempted      Additional Therapeutic activities/exercises completed this date:     []   Nu-step:  Time:        Level:         #Steps:       []   Rebounder:    []  Seated     []  Standing        []   Balance training         []   Postural training    []   Supine ther ex (reps/sets):     [x]   Seated ther ex (reps/sets):  triceps press-ups 3 x 10 40#   [x]   Standing ther ex (reps/sets):  2 x 10 mini-squat, hip abd kicks, hip ext kicks,    [x]   Other: gait - 95 ft + 40 ft + 105 ft (+ 75ft + 110 ft + 105 ft PM) using FWW at CGA ; dec adam, hop to, heavy BL UE reliance, dec endurance w/ noted SOB, consistent patterning    [x]   Other: curb stepping - 2 reps in // bars at SBA ; 4 reps using FWW at min A progressing to CGA w/ time on task and improving quality   [x]   Other:  stand pivot x 6 - to and from BR ; bed to chair, chair to chair ; practice w/ attention to quality   [x]   Other: hip flexor stretch (2 x 30\"), 2 x 10 quad sets, 2 x 10 SLR   [x]   Other: residual limb care education - extensive conversation regarding skin care, Home exercise, safety, plan for future therapy   [x]   Other: QI scoring above additionally indicative of other activities performed this session    Comments:      Patient/Caregiver Education and Training:   [x]   Role of PT  [x]   Education about Dx  [x]   Use of call light for assist   []   HEP provided and explained   [x]   Treatment plan reviewed  [x]   Home safety  []   Wheelchair mobility/management   [x]   Body mechanics  [x]   Bed Mobility/Transfer technique  [x]   Gait technique/sequencing  [x]   Proper use of assistive device/adaptive equipment  [x]   Stair training/Advanced mobility safety and technique  [x]   Reinforced patient's precautions/mobility while maintaining precautions  [x]   Postural awareness  []   Family/caregiver training  []   Progress was updated and reviewed in Rehabtracker with patient and/or family this date. []   Other:    Treatment Plan for Next Session: progress LE strength, balance, functional mobility training, cont curb step w/ FWW      Assessment: This pt demonstrated a positive response to today's treatment as evidenced by progress to QI scoring and improving quality of functional mobility in session. The patient is making good, steady progress toward established goals as evidenced by QI scores. Ongoing deficits are observed in the areas of strength, balance, endurance, functional mobility, safety and continued focus on these aforementioned deficits is recommended.        Treatment/Activity Tolerance:   [x] Tolerated treatment with no adverse effects    [] Patient limited by fatigue  [] Patient limited by pain   [] Patient limited by medical complications:    [] Adverse reaction to Tx:   [] Significant change in status    Safety:       []  bed alarm set    []  chair alarm set    []  Pt refused alarms                []  Telesitter activated      [x]  Gait belt used during tx session      []other:       Number of Minutes/Billable Intervention  Gait Training 30   Therapeutic Exercise 30 Neuro Re-Ed    Therapeutic Activity 60   Wheelchair Propulsion    Group    Other:    TOTAL 120     Social History  Social/Functional History  Lives With: Alone  Type of Home: House  Home Layout: Two level, Able to Live on Main level with bedroom/bathroom  Home Access: Ramped entrance  Bathroom Shower/Tub: Tub/Shower unit (TTB)  Bathroom Toilet: Standard  Bathroom Equipment: Tub transfer bench  Bathroom Accessibility: Accessible  Home Equipment: Nia Solis, Melo torres  Has the patient had two or more falls in the past year or any fall with injury in the past year?: Yes (Pt with 3-4 falls in the last year with no significant injuries.)  ADL Assistance: Independent  Homemaking Assistance: Independent  Homemaking Responsibilities: Yes  Meal Prep Responsibility: Primary  Laundry Responsibility: Primary  Cleaning Responsibility: Primary  Bill Paying/Finance Responsibility: Primary  Shopping Responsibility: Primary  Dependent Care Responsibility: No  Health Care Management: Primary  Ambulation Assistance: Independent (Pt has been using 2WW for the last couple months and manual w/c for a couple days prior to admission.)  Transfer Assistance: Independent  Active : Yes  Mode of Transportation: Car  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 Pt with 3-4 falls in the last year.     Objective                                                                                    Goals:  (Update in navigator)  Short Term Goals  Time Frame for Short term goals: 10-12 days STG=LTG  Short term goal 1: Pt will perform sit to stand, w/c<>bed and car transfers with mod I  Short term goal 2: Pt will ambulate 48' with 2ww onlevel surfaces with mod I, 10' on unlevel surface with supervision  Short term goal 3: Pt will ascend/descend 2\" step with 2ww with supervision  Short term goal 4: Pt will  light object with 2ww and reacher with CGA  Short term goal 5: Pt will perform w/c mobility in household setting with mod I:   :        Plan of Care                                                                              Times per week: 5 days per week for a minimum of 60 minutes/day plus group as appropriate for 60 minutes.   Treatment to include Current Treatment Recommendations: Strengthening, ROM, Balance training, Functional mobility training, Transfer training, ADL/Self-care training, IADL training, Cognitive/Perceptual training, Endurance training, Wheelchair mobility training, Gait training, Neuromuscular re-education, Safety education & training, Therapeutic activities, Patient/Caregiver education & training, Equipment evaluation, education, & procurement, Positioning, Home exercise program, Pain management, Stair training    Electronically signed by   Maria Ruvalcaba PT, DPT  9/12/2022, 3:31 PM

## 2022-09-12 NOTE — CARE COORDINATION
Received return phone call from , Khadar Gibbs. This SW shared with CM, Pt's wishes he has verbalized.  reported she would speak with Pt.

## 2022-09-12 NOTE — PATIENT CARE CONFERENCE
ACUTE REHAB TEAM CONFERENCE SUMMARY   [facility]    NAME: Vincenzo Hodgkin  : 1980 ADMIT DATE: 2022    Rehab Admitting Dx:Necrotizing fasciitis [M72.6]  Necrotizing fasciitis ([address] [M72.6]  Patient Comorbid Conditions: Active Hospital Problems    Diagnosis Date Noted    Status post below knee amputation, left [facility]) [Z89.512] 09/10/2022     Priority: Medium    Uncontrolled pain [R52] 09/10/2022     Priority: Medium    Generalized weakness [R53.1] 09/10/2022     Priority: Medium    Uncontrolled type 2 diabetes mellitus with peripheral neuropathy ([address] [E11.42, E11.65] 09/10/2022     Priority: Medium    Depression with anxiety [F41.8] 09/10/2022     Priority: Medium    Necrotizing fasciitis ([address] [M72.6] 2022     Priority: Medium    Acute blood loss anemia [D62] 2022     Priority: Medium    Diabetic ulcer of left midfoot associated with type 2 diabetes mellitus, with muscle involvement without evidence of necrosis ([address] [L23.699, L97.425] 2022     Priority: Medium     Date: 2022    CASE MANAGEMENT  Current issues/needs regarding patient and family discharge status: Patient goal is home alone.      PHYSICAL THERAPY (Updated in QI)  Short Term Goals  Time Frame for Short term goals: 10-12 days STG=LTG  Short term goal 1: Pt will perform sit to stand, w/c<>bed and car transfers with mod I  Short term goal 2: Pt will ambulate 48' with 2ww onlevel surfaces with mod I, 10' on unlevel surface with supervision  Short term goal 3: Pt will ascend/descend 2\" step with 2ww with supervision  Short term goal 4: Pt will  light object with 2ww and reacher with CGA  Short term goal 5: Pt will perform w/c mobility in household setting with mod I          Impairments/deficits, barriers:  self-efficacy, wound sound foot  Body Structures, Functions, Activity Limitations Requiring Skilled Therapeutic Intervention: Decreased functional mobility , Decreased endurance, Decreased cognition, Decreased strength, Decreased safe awareness, Decreased high-level IADLs, Decreased balance, Decreased sensation, Increased pain     Therapy Prognosis: Good  Decision Making: High Complexity  Clinical Presentation: unpredictable characteristics  Equipment needed at discharge:w/c, 2ww      PT IRF-DAYNA scores since initial assessment  Bed Mobility:   Roll Left and Right  Assistance Needed: Independent  Comment: no rails needed  CARE Score: 6  Discharge Goal: Independent    Sit to Lying  Assistance Needed: Independent  CARE Score: 6  Discharge Goal: Independent    Lying to Sitting on Side of Bed  Assistance Needed: Independent  CARE Score: 6  Discharge Goal: Independent    Transfers:    Sit to Stand  Assistance Needed: Supervision or touching assistance  Comment: sup for up to recliner  CARE Score: 4    Chair/Bed-to-Chair Transfer  Assistance Needed: Supervision or touching assistance  Comment: sup w/ FWW ; inc time and effort  CARE Score: 4  Discharge Goal: Independent         Car Transfer  Assistance Needed: Independent  Comment: mod ind  CARE Score: 6  Discharge Goal: Independent    Ambulation:    Walking Ability  Does the Patient Walk?: Yes     Walk 10 Feet  Assistance Needed: Supervision or touching assistance  Comment: SBA w/ FWW ; hop to  CARE Score: 4  Discharge Goal: Independent     Walk 50 Feet with Two Turns  Assistance Needed: Supervision or touching assistance  Comment: sup w/ hop to pattern  CARE Score: 4  Discharge Goal: Independent     Walk 150 Feet  Comment: distance contradictory 2/2 L heel wound w/ impaired healing ; max of 110 ft w/ FWW  Reason if not Attempted: Not attempted due to medical condition or safety concerns  CARE Score: 88  Discharge Goal: Not Attempted     Walking 10 Feet on Uneven Surfaces  Assistance Needed: Supervision or touching assistance  Comment: CGA w/ FWW ; inc time and effort, inferior gaze  CARE Score: 4  Discharge Goal: Supervision or touching assistance     1 Step (Curb)  Assistance Needed: Supervision or touching assistance  Comment: CGA w/ FWW ; mod cues  Reason if not Attempted: Not attempted due to medical condition or safety concerns  CARE Score: 4  Discharge Goal: Supervision or touching assistance     4 Steps  Comment: curb step only ; unsafe for performance and contraindicated 2/2 wound healing  Reason if not Attempted: Not attempted due to medical condition or safety concerns  CARE Score: 88  Discharge Goal: Not Attempted     12 Steps  Comment: curb step only ; unsafe for performance and contraindicated 2/2 wound healing  Reason if not Attempted: Not attempted due to medical condition or safety concerns  CARE Score: 88  Discharge Goal: Not Attempted           Wheelchair:  w/c Ability: Wheelchair Ability  Uses a Wheelchair and/or Scooter?: Yes    Wheel 50 Feet with Two Turns  Assistance Needed: Independent  Comment: B UE propulsion  CARE Score: 6  Discharge Goal: Independent    Wheel 150 Feet  Assistance Needed: Independent  Comment: B UE propulsion  CARE Score: 6  Discharge Goal: Independent              Balance:        Object: Picking Up Object  Assistance Needed: Independent  Comment: set-up for object retrieval ; mod ind  CARE Score: 6  Discharge Goal: Supervision or touching assistance    Fall Risk: [x]  Yes  []  No    OCCUPATIONAL THERAPY  (Updated in QI)  Short Term Goals  Time Frame for Short term goals: STGs=LTGs :   Long Term Goals  Time Frame for Long term goals : 10 days or until d/c. Long Term Goal 1: Pt will complete oral care and grooming tasks c Mod I.  Long Term Goal 2: Pt will complete total body bathing c AE PRN and Mod I.  Long Term Goal 3: Pt will complete UB dressing c IND. Long Term Goal 4: Pt will complete LB dressing c AE PRN and Mod I.  Long Term Goal 5: Pt will doff/don footwear to L foot c AE PRN and Mod I.   Additional Goals?: Yes  Long Term Goal 6: Pt will complete toileting c Mod I.  Long Term Goal 7: Pt will perform functional transfers (bed, chair, toilet, shower) c DME PRN and Mod I.  Long Term Goal 8: Pt will perform therex/therax to facilitate an increase in strength/endurance (c emphasis on dynamic standing balance/tolerance > 8 mins) c Mod I.  Long Term Goal 9: Pt will complete home management tasks c Mod I. :                                       OT IRF-DAYNA scores and goals since initial assessment:    ADLs:    Eating: Eating  Assistance Needed: Independent  Comment: Ind per patient report  CARE Score: 6  Discharge Goal: Independent       Oral Hygiene: Oral Hygiene  Assistance Needed: Independent  Comment: Mod I Rinses with  mouthwash seated sinkside  CARE Score: 6  Discharge Goal: Independent    Toiletin [address] needed: Supervision or touching assistance  Comment: Sup to manage clothes in stance completes hygiene seated  CARE Score: 4  Discharge Goal: Independent      UB/LB Bathing: Shower/Bathe Self  Assistance Needed: Supervision or touching assistance  Comment: Sup  seated entirety of shower weigt shifts to wash buttocks/periarea  CARE Score: 4  Discharge Goal: Independent    UB Dressing: Upper Body Dressing  Assistance Needed: Independent  Comment: X  CARE Score: 6  Discharge Goal: Independent         LB Dressing: Lower Body Dressing  Assistance Needed: Supervision or touching assistance  Comment: Sup/Mod I in stance at sink  counter using hip for balance  CARE Score: 4  Discharge Goal: Independent    Donning and [place] Footwear: Putting On/Taking Off Footwear  Assistance Needed: Independent  Comment: X  CARE Score: 6  Discharge Goal: Independent      Toilet Transfers:    Toilet Transfer  Assistance needed: Supervision or touching assistance  Comment: Sup  CARE Score: 4  Discharge Goal: Independent      Impairments/deficits, barriers: Decreased balance, endurance   Assessment  Performance deficits / Impairments: Decreased functional mobility , Decreased strength, Decreased endurance, Decreased coordination, Decreased ADL status, Decreased safe awareness, Decreased sensation, Decreased high-level IADLs, Decreased balance  Decision Making: Medium Complexity  Equipment needed at discharge:BSC? COGNITIVE FUNCTION/SPEECH THERAPY (AS INDICATED)  LTG                                 Nursing Current Medical Status:   [x] Is continent of bowel and bladder     [] Is incontinent of bowel and bladder    [x] Has had an adequate number of bowel movements   [] Urinates with no urinary retention >300ml in bladder   [] Targeting bladder protocol with montilla removal   [x] Maintaining O2 SATs at 92% or greater   [x] Has pain managed while on ARU         [x] Has had no skin breakdown while on ARU   [] Has improved skin integrity via wound measurements   [x] Has no signs/symptoms of infection at the wound site   [] Pressure wounds Stage/Location:    [] Arrived on unit with pressure wound  [x] Has been free from injury during hospitalization   [] Has experienced a fall during hospitalization  [] Ongoing education with patient/family with understanding demonstrated for:  [] Receives IV Fluids  [] Other:        NUTRITION  Weight: 171 lb 8.3 oz (77.8 kg) / Body mass index is 23.26 kg/m². Current diet: ADULT DIET; Regular; 4 carb choices (60 gm/meal)  ADULT ORAL NUTRITION SUPPLEMENT; Breakfast, Lunch, Dinner; Diabetic Oral Supplement  Intake: Meal intake % most meals and will drink oral nutrition supplement with meals       Medical improvements/barriers: progressing with PT,         Team goals for next treatment period/Intervention for current barriers:   [x] Pt will increase activity tolerance for daily tasks.   [] Pt will improve bed mobility with reduced assist.  [x] Pt will improve safety in fx tasks with reduced cues/assist  [x] Pt will improve transfers with reduced assist  [x] Pt will improve toileting with reduced assist  [x] Pt will improve ADL's with use of adaptive equipment with reduced assist  [] Pt will improve pain mgmt for maximum participation in tx program  [] Pt will improve communication to get basic needs met on unit  [] Pt will improve swallowing for safe diet advancement with use of strategies  []  Plan for discharge to home. Patient Strengths: Motivated, Cooperative, and Pleasant    Justification for Continued Stay  Based on my medical assessment of the patient and review of information from the interdisciplinary team as part of this weekly team conference, the patient continues to meet the following criteria for IRF level of care: The patient requires active and ongoing intervention of multiple therapy disciplines  The patient requires and intensive rehabilitation therapy program  The patient requires continued physician supervision by a rehabilitation physician  The patient requires 24 hours rehab nursing care  The patient requires an intensive and coordinated interdisciplinary team approach to the delivery of rehabilitative care. Assessment/Plan   [x]  The patient is making good progression towards their long term goals and is actively participating in and has a reasonable expectation to continue to benefit from the intensive rehabilitation therapy program   []  The estimated discharge date has been changed from initial team conference due to:   []  The estimated discharge destination has been changed from initial team conference due to:         Ongoing tx following discharge: [x][facility] PT  NSG   []OUTPATIENT     [] No Further Treatment     [] Family/Caregiver Training  []  Transitional Living Arrangement   [] Home Assessment (date  )     [] Family Conference   []  Therapeutic Pass       []  Other: (specify)    Estimated Discharge Date: 9/17/22    Estimated Discharge Destination: []home alone   [x]home alone with assist prn  []Continuous supervision []Return home with s/o/spouse/family   [] Assisted living    []SNF     Team members participating in today's conference.     [x] Margoth Murray Afshin Bautista Director  [x] Abran Lu, DPT,       [] Barth Bence, RN Nurse Supervisor     []  Darrel Taylor, PT  [x]  Tiana Diehl, PT       [x] Yimi Lawler, OT   [] Raheem An, OT  [] Justina Hamlin, OT     [x]  Juan A Dave, SLP    []  Fabi Trujillo, SLP   [] Brad Tom, IZAIAH      []Maddy Humphrey,   [x]Carla Conde MSW, LSW,      [x] Jannie Brizuela RN   [] Bill Elaine, MEGAN    [] Sandra Spicer RN    [] Davon Qureshi RN    [] Carmen Kumari, MEGAN   [] Olivia Florian, RN       I have led this Team Conference and agree with the plan, Fadi Tillman MD, 9/13/2022, 1:05 PM  Goals have been updated to reflect recent status.     Team conference note transcribed this date by: Ragini Vigil MA, [id number] [facility], Therapy Coordinator

## 2022-09-12 NOTE — PLAN OF CARE
Problem: Discharge Planning  Goal: Discharge to home or other facility with appropriate resources  Outcome: Progressing     Problem: Skin/Tissue Integrity  Goal: Absence of new skin breakdown  Description: 1. Monitor for areas of redness and/or skin breakdown  2. Assess vascular access sites hourly  3. Every 4-6 hours minimum:  Change oxygen saturation probe site  4. Every 4-6 hours:  If on nasal continuous positive airway pressure, respiratory therapy assess nares and determine need for appliance change or resting period.   Outcome: Progressing     Problem: Safety - Adult  Goal: Free from fall injury  Outcome: Progressing     Problem: Chronic Conditions and Co-morbidities  Goal: Patient's chronic conditions and co-morbidity symptoms are monitored and maintained or improved  Outcome: Progressing     Problem: ABCDS Injury Assessment  Goal: Absence of physical injury  Outcome: Progressing     Problem: Nutrition Deficit:  Goal: Optimize nutritional status  Outcome: Progressing     Problem: Pain  Goal: Verbalizes/displays adequate comfort level or baseline comfort level  Outcome: Progressing

## 2022-09-12 NOTE — PROGRESS NOTES
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 Minimal complaints of leg pain. No nausea. Prior (baseline) level of function: Independent. Current level of function:         Current  IRF-DAYNA and Goals:   Occupational Therapy:    Short Term Goals  Time Frame for Short term goals: STGs=LTGs :   Long Term Goals  Time Frame for Long term goals : 10 days or until d/c. Long Term Goal 1: Pt will complete oral care and grooming tasks c Mod I.  Long Term Goal 2: Pt will complete total body bathing c AE PRN and Mod I.  Long Term Goal 3: Pt will complete UB dressing c IND. Long Term Goal 4: Pt will complete LB dressing c AE PRN and Mod I.  Long Term Goal 5: Pt will doff/don footwear to L foot c AE PRN and Mod I. Additional Goals?: Yes  Long Term Goal 6: Pt will complete toileting c Mod I.  Long Term Goal 7: Pt will perform functional transfers (bed, chair, toilet, shower) c DME PRN and Mod I.  Long Term Goal 8: Pt will perform therex/therax to facilitate an increase in strength/endurance (c emphasis on dynamic standing balance/tolerance > 8 mins) c Mod I.  Long Term Goal 9: Pt will complete home management tasks c Mod I. :                                       Eating: Eating  Assistance Needed: Independent  Comment: Pt able to open all packages/containers to eat breakfast IND.   CARE Score: 6  Discharge Goal: Independent       Oral Hygiene: Oral Hygiene  Assistance Needed: Supervision or touching assistance  Comment: Pt brushed teeth sitting in w/c d/t safety. CARE Score: 4  Discharge Goal: Independent    UB/LB Bathing: Shower/Bathe Self  Assistance Needed: Supervision or touching assistance  Comment: Pt able to wash UB/LB seated for entirety with SBA. Pt weight shifted to wash rear. CARE Score: 4  Discharge Goal: Independent    UB Dressing: Upper Body Dressing  Assistance Needed: Setup or clean-up assistance  Comment: Setup assist to doff/don pullover tshirt. CARE Score: 5  Discharge Goal: Independent         LB Dressing: Lower Body Dressing  Assistance Needed: Partial/moderate assistance  Comment: Pt able to doff shorts c CGA in stance, Pt required slight Min A to hold pants up as Pt unable to fasten pants in stance, completed seated. Pt able to doff [place], however required Min A to don. CARE Score: 3  Discharge Goal: Independent    Donning and [place] Footwear: Putting On/Taking Off Footwear  Assistance Needed: Setup or clean-up assistance  Comment: Setup assist to doff/don sock and velcro hospital shoe to L foot. CARE Score: 5  Discharge Goal: Independent      Toiletin [address] needed: Supervision or touching assistance  Comment: SBA for CM  CARE Score: 4  Discharge Goal: Independent      Toilet Transfers:   Toilet Transfer  Assistance needed: Supervision or touching assistance  Comment: 3 (deemed usual performance per team huddle)  CARE Score: 4  Discharge Goal: Independent    Physical Therapy:   Short Term Goals  Time Frame for Short term goals: 10-12 days STG=LTG  Short term goal 1: Pt will perform sit to stand, w/c<>bed and car transfers with mod I  Short term goal 2: Pt will ambulate 48' with 2ww onlevel surfaces with mod I, 10' on unlevel surface with supervision  Short term goal 3: Pt will ascend/descend 2\" step with 2ww with supervision  Short term goal 4: Pt will  light object with 2ww and reacher with CGA  Short term goal 5: Pt will perform w/c mobility in household setting with mod I            Bed Mobility:   Sit to Lying  Assistance Needed: Independent  CARE Score: 6  Discharge Goal: Independent  Roll Left and Right  Assistance Needed: Independent  Comment: no rails needed  CARE Score: 6  Discharge Goal: Independent  Lying to Sitting on Side of Bed  Assistance Needed: Independent  CARE Score: 6  Discharge Goal: Independent    Transfers:    Sit to Stand  Assistance Needed: Supervision or touching assistance  Comment: sup for up to recliner  CARE Score: 4  Chair/Bed-to-Chair Transfer  Assistance Needed: Supervision or touching assistance  Comment: sup w/ FWW ; inc time and effort  CARE Score: 4  Discharge Goal: Independent     Car Transfer  Assistance Needed: Independent  Comment: mod ind  CARE Score: 6  Discharge Goal: Independent    Ambulation:    Walking Ability  Does the Patient Walk?: Yes     Walk 10 Feet  Assistance Needed: Supervision or touching assistance  Comment: SBA w/ FWW ; hop to  CARE Score: 4  Discharge Goal: Independent     Walk 50 Feet with Two Turns  Assistance Needed: Supervision or touching assistance  Comment: sup w/ hop to pattern  CARE Score: 4  Discharge Goal: Independent     Walk 150 Feet  Comment: distance contradictory 2/2 L heel wound w/ impaired healing ; max of 110 ft w/ FWW  Reason if not Attempted: Not attempted due to medical condition or safety concerns  CARE Score: 88  Discharge Goal: Not Attempted     Walking 10 Feet on Uneven Surfaces  Assistance Needed: Supervision or touching assistance  Comment: CGA w/ FWW ; inc time and effort, inferior gaze  CARE Score: 4  Discharge Goal: Supervision or touching assistance     1 Step (Curb)  Assistance Needed: Supervision or touching assistance  Comment: CGA w/ FWW ; mod cues  Reason if not Attempted: Not attempted due to medical condition or safety concerns  CARE Score: 4  Discharge Goal: Supervision or touching assistance     4 Steps  Comment: curb step only ; unsafe for performance and contraindicated 2/2 wound healing  Reason if not Attempted: Not attempted due to medical condition or safety concerns  CARE Score: 88  Discharge Goal: Not Attempted     12 Steps  Comment: curb step only ; unsafe for performance and contraindicated 2/2 wound healing  Reason if not Attempted: Not attempted due to medical condition or safety concerns  CARE Score: 88  Discharge Goal: Not Attempted       Wheelchair:  w/c Ability: Wheelchair Ability  Uses a Wheelchair and/or Scooter?: Yes  Wheel 50 Feet with Two Turns  Assistance Needed: Independent  Comment: B UE propulsion  CARE Score: 6  Discharge Goal: Independent  Wheel 150 Feet  Assistance Needed: Independent  Comment: B UE propulsion  CARE Score: 6  Discharge Goal: Independent          Balance:        Object: Picking Up Object  Assistance Needed: Independent  Comment: set-up for object retrieval ; mod ind  CARE Score: 6  Discharge Goal: Supervision or touching assistance    I      Exam:    Blood pressure (!) 140/84, pulse 67, temperature 98.2 °F (36.8 °C), temperature source Oral, resp. rate 16, height 6' (1.829 m), weight 173 lb 11.6 oz (78.8 kg), SpO2 99 %. General: Propelling a wheelchair in the hallway in therapy. Alert and talkative. In good spirits. HEENT: Gazing right and left. MMM. Neck supple. Pulmonary: Symmetric air exchange without wheezes or rales. Cardiac: Regular rate and rhythm. Abdomen: Patient's abdomen is soft and nondistended. Bowel sounds were present throughout. There was no rebound, guarding or masses noted. Upper extremities: Using his upper limbs to propel the wheelchair. Fair  strength and coordination. Diminished sensation in the fingers. Lower extremities: His right stump was mildly swollen and tender to palpation but dry. Left calf was soft and the left foot was wrapped and swollen. Rigid soled fracture shoe in place. Sitting balance was good.   Standing balance was poor.    Lab Results   Component Value Date    WBC 11.6 (H) 09/08/2022    HGB 11.8 (L) 09/08/2022    HCT 36.9 (L) 09/08/2022    MCV 91.1 09/08/2022     (H) 09/08/2022     Lab Results   Component Value Date    INR 0.89 05/20/2022    INR 1.14 04/19/2022    INR 1.27 06/12/2021    PROTIME 11.5 (L) 05/20/2022    PROTIME 14.7 (H) 04/19/2022    PROTIME 15.4 (H) 06/12/2021     Lab Results   Component Value Date    CREATININE 1.7 (H) 09/12/2022    BUN 14 09/12/2022     09/12/2022    K 3.7 09/12/2022    CL 99 09/12/2022    CO2 30 09/12/2022     Lab Results   Component Value Date    ALT 15 09/04/2022    AST 24 09/04/2022    ALKPHOS 73 09/04/2022    BILITOT 0.3 09/04/2022       Expected length of stay  prior to a supervised level of function for discharge home with a wheelchair/walker and [facility] OT/PT is 2 weeks. Recommendations:    Necrotizing fasciitis of the right lower limb with amputation: We are developing the routine for his daily occupational and physical therapy. His surgeon has directed me to stop his IV Maxipime, Cleocin and vancomycin at this point. We will follow-up in the office in 2 weeks. Ongoing aggressive pulmonary management, wound care and DVT prophylaxis. Practicing techniques for self-care activities and mobility with no weightbearing through the right lower limb. Caregiver training will be offered. Local wound care, nutritional support and wound care of the left foot. Outpatient follow-up with his surgeon and PCP. Verbal cues and minimum physical assistance for transfers. DVT prophylaxis: Lovenox 40 mg subcu daily. I must monitor his hemoglobin and platelet count while on this medication. Weightbearing activities are limited but will be tried through the left foot daily. GI prophylaxis is offered. No signs of acute blood loss. Uncontrolled pain: His needs are being met with oral oxycodone as needed. Scheduled acetaminophen.   He may be a candidate for gabapentin if he decides his pain needs are not being met. Bowel intervention while on narcotics. Limb elevation and progressive mobilization. Necrotizing fasciitis: IV antibiotics were tolerated well but will be discontinued today. Monitoring him for signs of further soft tissue compromise. Monitoring his fever curve and for any leukocytosis. Uncontrolled diabetes type 2 with peripheral neuropathy: Patient requires a diet modified for carbohydrates. He is on scheduled Lantus and Humalog sliding scale. Blood sugars are checked at mealtime and bedtime. Hypertension: Cozaar and Toprol for blood pressure regulation. Vital signs are checked at rest and with activity. Target systolic blood pressures 620-972. His blood pressure is within the target range today. Pneumatosis coli: He has completed a full course of antibiotics. Outpatient follow-up with gastroenterology with reimaging down the road. Monitoring him for GI symptoms. Left foot diabetic ulcer: Wound care team is directing the management of the left foot's local wound care. Monitoring drainage and tenderness. Weightbearing through the left heel and plantar surface for now. Depression with anxiety: Sleep regulation, pain management and treatment in a calm and consistent environment. Caregiver training and involved in the planning for discharge.

## 2022-09-12 NOTE — PROGRESS NOTES
[facility]  consulted by Dr. Debora Woodruff for monitoring and adjustment. Indication for treatment: SSTI  Goal trough: [x] 10-15 mcg/mL or [] 15-20 mcg/ml  AUC/LIDIA: [x] <500 or [] 400-600    Pertinent Laboratory Values:   Temp Readings from Last 3 Encounters:   09/12/22 98.2 °F (36.8 °C) (Oral)   09/08/22 98.3 °F (36.8 °C) (Oral)   09/02/22 98.6 °F (37 °C) (Oral)     No results for input(s): WBC, LACTATE in the last 72 hours. Recent Labs     09/10/22  0134 09/11/22  0724 09/12/22  0431   BUN  --  14 14   CREATININE 1.7* 1.9* 1.7*       Estimated Creatinine Clearance: 63 mL/min (A) (based on SCr of 1.7 mg/dL (H)).     Intake/Output Summary (Last 24 hours) at 9/12/2022 1244  Last data filed at 9/12/2022 4569  Gross per 24 hour   Intake 592 ml   Output 450 ml   Net 142 ml         Pertinent Cultures:  Date    Source    Results  9/02   Blood x 2   NGTD      Vancomycin level:   RANDOM:    Recent Labs     09/11/22  0724 09/12/22  0431   VANCORANDOM 16.8 21.1       Assessment:  Renal function:   GASTON, previous Scr in range 0.6-0.7  Scr improved today @ 1.7  Repeat renal function panel tomorrow AM to continue trending  S/p right leg guillotine BKA  Day(s) of therapy: 4 of 10 (continued from inpatient)  Vancomycin level:   9/3: 10.3 (12 hours post dose) (therapeutic)  9/6: 18.8 ()  9/11: 16.8, 26h post-dose, therapeutic but above desired goal for indication  9/12:  21.1, 18 hours post-dose, above goal    Plan:  GASTON; continue intermittent dosing based on random level  Random level remains above goal @ 21.1 today  No Vancomycin today  Repeat level tomorrow AM  Re-dose as appropriate per level  Pharmacy will continue to monitor patient and adjust therapy as appropriate    VANCOMYCIN CONCENTRATION SCHEDULED FOR 9/13 @0600    Thank you for the consult,  Laly Decker, [address]  9/12/2022  12:53 PM

## 2022-09-13 LAB
GLUCOSE BLD-MCNC: 122 MG/DL (ref 70–99)
GLUCOSE BLD-MCNC: 140 MG/DL (ref 70–99)
GLUCOSE BLD-MCNC: 153 MG/DL (ref 70–99)
GLUCOSE BLD-MCNC: 169 MG/DL (ref 70–99)

## 2022-09-13 PROCEDURE — 6360000002 HC RX W HCPCS: Performed by: STUDENT IN AN ORGANIZED HEALTH CARE EDUCATION/TRAINING PROGRAM

## 2022-09-13 PROCEDURE — 94761 N-INVAS EAR/PLS OXIMETRY MLT: CPT

## 2022-09-13 PROCEDURE — 97530 THERAPEUTIC ACTIVITIES: CPT

## 2022-09-13 PROCEDURE — 97110 THERAPEUTIC EXERCISES: CPT

## 2022-09-13 PROCEDURE — 6370000000 HC RX 637 (ALT 250 FOR IP): Performed by: STUDENT IN AN ORGANIZED HEALTH CARE EDUCATION/TRAINING PROGRAM

## 2022-09-13 PROCEDURE — 99233 SBSQ HOSP IP/OBS HIGH 50: CPT | Performed by: PHYSICAL MEDICINE & REHABILITATION

## 2022-09-13 PROCEDURE — 97150 GROUP THERAPEUTIC PROCEDURES: CPT

## 2022-09-13 PROCEDURE — 82962 GLUCOSE BLOOD TEST: CPT

## 2022-09-13 PROCEDURE — 6370000000 HC RX 637 (ALT 250 FOR IP): Performed by: PHYSICAL MEDICINE & REHABILITATION

## 2022-09-13 PROCEDURE — 97535 SELF CARE MNGMENT TRAINING: CPT

## 2022-09-13 PROCEDURE — 1280000000 HC REHAB R&B

## 2022-09-13 PROCEDURE — 94150 VITAL CAPACITY TEST: CPT

## 2022-09-13 RX ADMIN — OXYCODONE HYDROCHLORIDE 10 MG: 10 TABLET ORAL at 22:07

## 2022-09-13 RX ADMIN — ATORVASTATIN CALCIUM 40 MG: 40 TABLET, FILM COATED ORAL at 21:04

## 2022-09-13 RX ADMIN — LOSARTAN POTASSIUM 25 MG: 25 TABLET, FILM COATED ORAL at 08:33

## 2022-09-13 RX ADMIN — SUCRALFATE 1 G: 1 TABLET ORAL at 08:33

## 2022-09-13 RX ADMIN — OXYCODONE HYDROCHLORIDE 10 MG: 10 TABLET ORAL at 18:04

## 2022-09-13 RX ADMIN — OXYCODONE HYDROCHLORIDE 10 MG: 10 TABLET ORAL at 04:51

## 2022-09-13 RX ADMIN — PANTOPRAZOLE SODIUM 20 MG: 20 TABLET, DELAYED RELEASE ORAL at 05:49

## 2022-09-13 RX ADMIN — SUCRALFATE 1 G: 1 TABLET ORAL at 21:04

## 2022-09-13 RX ADMIN — METOPROLOL SUCCINATE 25 MG: 25 TABLET, EXTENDED RELEASE ORAL at 08:33

## 2022-09-13 RX ADMIN — INSULIN GLARGINE 30 UNITS: 100 INJECTION, SOLUTION SUBCUTANEOUS at 21:05

## 2022-09-13 RX ADMIN — HYDROXYZINE HYDROCHLORIDE 10 MG: 10 TABLET ORAL at 05:50

## 2022-09-13 RX ADMIN — FLUOXETINE 80 MG: 20 CAPSULE ORAL at 08:33

## 2022-09-13 RX ADMIN — DOCUSATE SODIUM 100 MG: 100 CAPSULE, LIQUID FILLED ORAL at 08:33

## 2022-09-13 RX ADMIN — ENOXAPARIN SODIUM 40 MG: 40 INJECTION SUBCUTANEOUS at 08:35

## 2022-09-13 ASSESSMENT — PAIN DESCRIPTION - DESCRIPTORS
DESCRIPTORS: ACHING

## 2022-09-13 ASSESSMENT — PAIN DESCRIPTION - ORIENTATION
ORIENTATION: RIGHT

## 2022-09-13 ASSESSMENT — PAIN SCALES - GENERAL
PAINLEVEL_OUTOF10: 7
PAINLEVEL_OUTOF10: 0
PAINLEVEL_OUTOF10: 7
PAINLEVEL_OUTOF10: 5
PAINLEVEL_OUTOF10: 7
PAINLEVEL_OUTOF10: 2

## 2022-09-13 ASSESSMENT — PAIN DESCRIPTION - FREQUENCY
FREQUENCY: CONTINUOUS
FREQUENCY: CONTINUOUS

## 2022-09-13 ASSESSMENT — PAIN DESCRIPTION - ONSET
ONSET: ON-GOING
ONSET: ON-GOING

## 2022-09-13 ASSESSMENT — PAIN DESCRIPTION - LOCATION
LOCATION: LEG

## 2022-09-13 ASSESSMENT — PAIN DESCRIPTION - PAIN TYPE
TYPE: ACUTE PAIN
TYPE: SURGICAL PAIN

## 2022-09-13 NOTE — PROGRESS NOTES
This nurse entered patients room to answer call light, patient moving around in bed agitated, forcefully taking brace to leg off, angry stating \"my head is killing me, and this thing hurts my leg! \" This nurse in a calm voice educated patient on the importance of the leg brace and that pain medication has already been given and offered a snack and some warm blankets. Patient calmed down and stated sorry for the behavior excepted the warm blanket to body and head and a snack. Will monitor. Brace to leg remains in place.

## 2022-09-13 NOTE — PLAN OF CARE
Problem: Discharge Planning  Goal: Discharge to home or other facility with appropriate resources  9/13/2022 0446 by José Miguel Gallo LPN  Outcome: Progressing  9/12/2022 1554 by Eileen Terrell LPN  Outcome: Progressing     Problem: Skin/Tissue Integrity  Goal: Absence of new skin breakdown  Description: 1. Monitor for areas of redness and/or skin breakdown  2. Assess vascular access sites hourly  3. Every 4-6 hours minimum:  Change oxygen saturation probe site  4. Every 4-6 hours:  If on nasal continuous positive airway pressure, respiratory therapy assess nares and determine need for appliance change or resting period.   9/13/2022 0446 by José Miguel Gallo LPN  Outcome: Progressing  9/12/2022 1554 by Eileen Terrell LPN  Outcome: Progressing     Problem: Safety - Adult  Goal: Free from fall injury  9/13/2022 0446 by José Miguel Gallo LPN  Outcome: Progressing  9/12/2022 1554 by Eileen Terrell LPN  Outcome: Progressing     Problem: Chronic Conditions and Co-morbidities  Goal: Patient's chronic conditions and co-morbidity symptoms are monitored and maintained or improved  9/13/2022 0446 by José Miguel Gallo LPN  Outcome: Progressing  9/12/2022 1554 by Eileen Terrell LPN  Outcome: Progressing     Problem: ABCDS Injury Assessment  Goal: Absence of physical injury  9/13/2022 0446 by José Miguel Gallo LPN  Outcome: Progressing  9/12/2022 1554 by Eileen Terrell LPN  Outcome: Progressing     Problem: Nutrition Deficit:  Goal: Optimize nutritional status  9/13/2022 0446 by José Miguel Gallo LPN  Outcome: Progressing  9/12/2022 1554 by Eileen Terrell LPN  Outcome: Progressing     Problem: Pain  Goal: Verbalizes/displays adequate comfort level or baseline comfort level  9/13/2022 0446 by José Miguel Gallo LPN  Outcome: Progressing  9/12/2022 1554 by Eileen Terrell LPN  Outcome: Progressing

## 2022-09-13 NOTE — PROGRESS NOTES
Physical Therapy      [x] daily progress note       [] discharge       Patient Name:  Leonardo Leblanc   :  1980 MRN: [id number]  Room:  [address] [place], [place] [address] Date of Admission: 2022    Rehabilitation Diagnosis:     Necrotizing fasciitis [M72.6]  Necrotizing fasciitis ([address] [M72.6]    Date  2022   TIMES IN/OUT   1300/1400   Group Tx Minutes 60   TOTAL Tx time seen 60   Variance Time    Variance Reason    [] Refusal due to   [] Medical hold/reason  [] Illness   [] Off Unit for test/procedure  [] Extra time needed to complete task  [] Other (specify)   Restrictions/Precautions Restrictions/Precautions  Restrictions/Precautions: General Precautions, Fall Risk, Weight Bearing  Required Braces or Orthoses?: Yes (Pt with HARSHAL Willoughby.)  Position Activity Restriction  Other position/activity restrictions: IV access to BUEs   Current Diet/Swallowing Issues ADULT DIET; Regular; 4 carb choices (60 gm/meal)  ADULT ORAL NUTRITION SUPPLEMENT; Breakfast, Lunch, Dinner; Diabetic Oral Supplement   Communication with other providers: [x] Ok to see per nursing at morning huddle  [] Medical hold and reason  [] Spoke with (team    member) regarding   Subjective observations: Pt agreeable to group session. Pain level/location:    Alarm placed/where? Fall Risk  [] Pt taken to DR for lunch with nsg present   [x] Pt taken back to room with chair/bed alarm in place by rehab aide     Group Activity:    Total time in group = 60min   Balance Group: Patient performs a variety of seated standing activities as able, with focus on technique, safety and balance. This provides the opportunity to have fun, build camaraderie, increase endurance, and improve balance and strength. Balance activities incorporate dynamic reaching at seated level using reacher and walking on level surface in slalom pattern using 2WW. Education on fall prevention, fall risks and fall recovery.            Functional areas targeted:   [] Communication [] Memory  [] Coordination    [] Kitchen Safety [] Problem Solving  [] Strengthening     [] Attention  [x] Endurance   [x] Mobility    [x] Awareness/Insight [x] Balance  [x] Transfers  [] Social/Pragmatics [] Sequencing  [x] Equipment Use   [x] Safety   [] Other (specify)     Participation:  [x] Actively participated    [] Required minimal cues   [] Required maximal cues [x] Other (specify) pt had 1 episode of intercepted fall during process of stand->sit following level surface ambulation due to L knee instability      Assessment / Impression                                                          Treatment/Activity Tolerance:   [x] Tolerated Treatment well:     [] Patient limited by fatigue/pain:       [] Patient limited by medical complications:    [] Adverse Reaction to Tx:   [] Significant change in status      Number of Minutes/Billable Intervention  Gait Training    Therapeutic Exercise    Neuro Re-Ed    Therapeutic Activity    Wheelchair Propulsion    Group 60   Other:    TOTAL 60         Social History  Social/Functional History  Lives With: Alone  Type of Home: House  Home Layout: Two level, Able to Live on Main level with bedroom/bathroom  Home Access: Ramped entrance  Bathroom Shower/Tub: Tub/Shower unit (TTB)  Bathroom Toilet: Standard  Bathroom Equipment: Tub transfer bench  Bathroom Accessibility: Accessible  Home Equipment: [place], rolling, [place]  Has the patient had two or more falls in the past year or any fall with injury in the past year?: Yes (Pt with 3-4 falls in the last year with no significant injuries.)  ADL Assistance: Independent  Homemaking Assistance: Independent  Homemaking Responsibilities: Yes  Meal Prep Responsibility: Primary  Laundry Responsibility: Primary  Cleaning Responsibility: Primary  Bill Paying/Finance Responsibility: Primary  Shopping Responsibility: Primary  Dependent Care Responsibility: No  Health Care Management: Primary  Ambulation Assistance: Independent (Pt has been using 2WW for the last couple months and manual w/c for a couple days prior to admission.)  Transfer Assistance: Independent  Active : Yes  Mode of Transportation: Car  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 Pt with 3-4 falls in the last year. Objective                                                                                    Goals:  (Update in navigator)  Short Term Goals  Time Frame for Short term goals: 10-12 days STG=LTG  Short term goal 1: Pt will perform sit to stand, w/c<>bed and car transfers with mod I  Short term goal 2: Pt will ambulate 48' with 2ww onlevel surfaces with mod I, 10' on unlevel surface with supervision  Short term goal 3: Pt will ascend/descend 2\" step with 2ww with supervision  Short term goal 4: Pt will  light object with 2ww and reacher with CGA  Short term goal 5: Pt will perform w/c mobility in household setting with mod I:   :        Plan of Care                                                                              Times per week: Pt to be seen at least  5x per week for a minimum of 60 minutes as                               appropriate.   Treatment to include Current Treatment Recommendations: Strengthening, ROM, Balance training, Functional mobility training, Transfer training, ADL/Self-care training, IADL training, Cognitive/Perceptual training, Endurance training, Wheelchair mobility training, Gait training, Neuromuscular re-education, Safety education & training, Therapeutic activities, Patient/Caregiver education & training, Equipment evaluation, education, & procurement, Positioning, Home exercise program, Pain management, Stair training      Electronically signed by   Leatha Geller PT  9/13/2022, 2:12 PM

## 2022-09-13 NOTE — PROGRESS NOTES
Occupational Therapy  Physical Rehabilitation: OCCUPATIONAL THERAPY     [x] daily progress note       [] discharge       Patient Name:  Shailesh Barba   :  1980 MRN: [id number]  Room:  [address] [place] [address] Date of Admission: 2022  Rehabilitation Diagnosis:   Necrotizing fasciitis [M72.6]  Necrotizing fasciitis ([address] [M72.6]       Date 2022       Day of ARU Week:  6   Time IN/OUT 1417/1517   Individual Tx Minutes 60   Group Tx Minutes    Co-Treat Minutes    Concurrent Tx Minutes    TOTAL Tx Time Mins 60   Variance Time    Variance Time []   Refusal due to:     []   Medical hold/reason:    []   Illness   []   Off Unit for test/procedure  []   Extra time needed to complete task  []   Therapeutic need  []   Other (specify):   Restrictions Restrictions/Precautions: General Precautions, Fall Risk, Weight Bearing         Communication with other providers: [x]   OK to see per nursing:     []   Spoke with team member regarding:      Subjective observations and cognitive status: Pt in bed on approach, reporting some fatigue but agreeable to tx session. Expressed some anxiety about being able to manage things at home; educated on option of delivery services for groceries.       Pain level/location:    /10       Location: Denied   Discharge recommendations  Anticipated discharge date:    Destination: []home alone   [x]home alone w assist prn   [] home w/ family    [] Continuous supervision       []SNF    [] Assisted living     [] Other:   Continued therapy: []HHC OT  []OUTPATIENT  OT   [x] No Further OT  Equipment needs: None, has BSC and TTB     Toileting:   Supervision      Toilet Transfers:   Supervision to<>from W/C  Device Used:    []   Standard Toilet         [x]   Grab Bars           []  Bedside Commode       []   Elevated Toilet          []   Other:        Bed Mobility:           []   Pt received out of bed   Supine --> Sit:  Mod I   Mod I     Transfers:    Sit--> Stand:  Supervision  Stand --> Sit: Supervision  Stand-Pivot:   Supervision  Other:    Assistive device required for transfer:   W/C      Functional Mobility:    Assistance: Mod I >1000 ft total including out to lobby to use EDIN; emphasis on increasing BUE endurance. Pt reported some fatigue after task requiring rest break  Device:   []   Rolling Walker     []   Standard Walker [x]   Wheelchair        []   [employer]       []   4-Wheeled Funk Sames         []   Cardiac Walker       []   Other:           Additional Therapeutic activities/exercises completed this date:     []   ADL Training   [x]   Balance/Postural training: With emphasis on IADL independence, pt stood at washer with supervision to retrieve clothing and set on dryer, then loaded clothing into dryer seated mod I. Pt did require cue for sequencing; retrieving clothing out of washer pt stated \"It's still wet\", requiring cue that clothing needed to go into dryer    Pt attempted TTA in stance to address dynamic standing balance, however L knee gave out once standing with pt requiring immediate return to sitting. Adapted TTA to perform in sitting with emphasis on BUE strengthening; completing shoulder arc with pt reaching ipsilaterally>contralaterally c 2.5# wrist weight donned; ~15 times with each UE      [x]   Bed/Transfer Training   [x]   Endurance Training   []   Neuromuscular Re-ed   []   Nu-step:  Time:        Level:         #Steps:       []   Rebounder:    []  Seated     []  Standing        []   Supine Ther Ex (reps/sets):     [x]   Seated Ther Ex (reps/sets):   To increase BUE endurance for ADLs and transfers, pt completed 1 set x10 reps of the following therex, alternating UEs, c a 4# weight:   Shoulder presses  Chest presses  Shoulder abduction/adduction  Shoulder horizontal abduction/adduction  Concentric arm circles (clockwise)  Bicep curls    To increase BUE endurance for ADLs and transfers, pt completed 5 sets x10 reps on rickShuame with 45#     []   Standing Ther Ex (reps/sets):     [] Other:      Comments: All intervention performed to increase pt's strength, endurance, ax tolerance, and balance in prep for increased I c ADL/IADLs and functional transfers/mobility. Patient/Caregiver Education and Training:   []   [employer] Equipment Use  [x]   Bed Mobility/Transfer Technique/Safety  []   Energy Conservation Tips  []   Family training  [x]   Postural Awareness  [x]   Safety During Functional Activities  []   Reinforced Patient's Precautions   []   Progress was updated and reviewed in Rehabtracker with patient and/or family this         date. Treatment Plan for Next Session: Continue OT POC      Assessment: This pt demonstrated a positive  response to today's treatment as evidenced by good engagement in BUE endurance training. The patient is making  progress toward established goals as evidenced by QI scores. Ongoing deficits are observed in the areas of balance, endurance and continued focus on this is recommended.        Treatment/Activity Tolerance:   [x] Tolerated treatment with no adverse effects    [] Patient limited by fatigue  [] Patient limited by pain   [] Patient limited by medical complications:    [] Adverse reaction to Tx:   [] Significant change in status    Safety:       [x]  bed alarm set    []  chair alarm set    []  Pt refused alarms                []  Telesitter activated      [x]  Gait belt used during tx session      []other:       Number of Minutes/Billable Intervention  Therapeutic Exercise 35   ADL Self-care    Neuro Re-Ed    Therapeutic Activity 25   Group    Other:    TOTAL 60       Social History  Social/Functional History  Lives With: Alone  Type of Home: House  Home Layout: Two level, Able to Live on Main level with bedroom/bathroom  Home Access: Ramped entrance  Bathroom Shower/Tub: Tub/Shower unit (TTB)  Bathroom Toilet: Standard  Bathroom Equipment: Tub transfer bench  Bathroom Accessibility: Accessible  Home Equipment: Walker, [place], Wheelchair-manual, Sergee  Has the patient had two or more falls in the past year or any fall with injury in the past year?: Yes (Pt with 3-4 falls in the last year with no significant injuries.)  ADL Assistance: Independent  Homemaking Assistance: Independent  Homemaking Responsibilities: Yes  Meal Prep Responsibility: Primary  Laundry Responsibility: Primary  Cleaning Responsibility: Primary  Bill Paying/Finance Responsibility: Primary  Shopping Responsibility: Primary  Dependent Care Responsibility: No  Health Care Management: Primary  Ambulation Assistance: Independent (Pt has been using 2WW for the last couple months and manual w/c for a couple days prior to admission.)  Transfer Assistance: Independent  Active : Yes  Mode of Transportation: Car  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 Pt with 3-4 falls in the last year. Objective                                                                                    Goals:  (Update in navigator)  Short Term Goals  Time Frame for Short term goals: STGs=LTGs:  Long Term Goals  Time Frame for Long term goals : 10 days or until d/c. Long Term Goal 1: Pt will complete oral care and grooming tasks c Mod I.  Long Term Goal 2: Pt will complete total body bathing c AE PRN and Mod I.  Long Term Goal 3: Pt will complete UB dressing c IND. Long Term Goal 4: Pt will complete LB dressing c AE PRN and Mod I.  Long Term Goal 5: Pt will doff/don footwear to L foot c AE PRN and Mod I.   Additional Goals?: Yes  Long Term Goal 6: Pt will complete toileting c Mod I.  Long Term Goal 7: Pt will perform functional transfers (bed, chair, toilet, shower) c DME PRN and Mod I.  Long Term Goal 8: Pt will perform therex/therax to facilitate an increase in strength/endurance (c emphasis on dynamic standing balance/tolerance > 8 mins) c Mod I.  Long Term Goal 9: Pt will complete home management tasks c Mod I.: Plan of Care                                                                              Times per week: 5 days per week for a minimum of 60 minutes/day plus group as appropriate for 60 minutes. Treatment to include Plan  Times per Day: Daily  Current Treatment Recommendations: Strengthening, Functional mobility training, Endurance training, Pain management, Safety education & training, Patient/Caregiver education & training, Equipment evaluation, education, & procurement, Positioning, Self-Care / ADL, Home management training, Balance training, Coordination training    Electronically signed by   Mary Baca MS, OTR/L  License #[id number]  9/13/2022, 3:00 PM

## 2022-09-13 NOTE — CARE COORDINATION
LSW met with patient following Care Conference. LSW informed patient of recommendations for [place] [facility] [address] PT and Nursing and patient is agreeable. Patient verbalized understanding and chose [facility]. Patient provided with list of Medicare participating [place] [facility] [address] in the geographic area of the patient served. Patient selected [facility] and was provided with a comparative data handout from [address] [place] [address] website. The patient (and/or Family) was educated on the quality outcomes for each provider. Patient (and/or Family) demonstrated understanding. Per patient/family request, referral will be made to [facility] closer to discharge. Discussed with patient Meals on Wheels, [facility], Meds to Bed, and provided resources for transport and non Medical Agencies. D/C Plan:  Estimated Date:  Sept 17  DME: Has all needed DME  HHC:  PT, Nursing ([facility])  To: Home alone (Will need transport)    [facility] [phone] spoke with Cristine Serrano. She transferred me to the [facility] line to make a referral. Left a VM, awaiting a call back.

## 2022-09-13 NOTE — PROGRESS NOTES
Physical Rehabilitation: OCCUPATIONAL THERAPY     [x] daily progress note       [] discharge       Patient Name:  Caden Magdaleno   :  1980 MRN: [id number]  Room:  [address] [place], [place] [address] Date of Admission: 2022  Rehabilitation Diagnosis:   Necrotizing fasciitis [M72.6]  Necrotizing fasciitis ([address] [M72.6]       Date 2022       Day of ARU Week:  6   Time IN/OUT 1030/1130   Individual Tx Minutes 60   Group Tx Minutes    Co-Treat Minutes    Concurrent Tx Minutes    TOTAL Tx Time Mins 60   Variance Time    Variance Time []   Refusal due to:     []   Medical hold/reason:    []   Illness   []   Off Unit for test/procedure  []   Extra time needed to complete task  []   Therapeutic need  []   Other (specify):   Restrictions Restrictions/Precautions: General Precautions, Fall Risk, Weight Bearing         Communication with other providers: [x]   OK to see per nursing:     []   Spoke with team member regarding:      Subjective observations and cognitive status: Patient seated in high flowers upon  approach, pleasant and requesting full shower      Pain level/location:    /10       Location: per patient no pain noted    Discharge recommendations  Anticipated discharge date:  TBD  Destination: []home alone   []home alone w assist prn   [] home w/ family    [] Continuous supervision       []SNF    [] Assisted living     [] Other:   Continued therapy: []HHC OT  []OUTPATIENT  OT   [] No Further OT  Equipment needs: None        ADLs:    Eating: Eating  Assistance Needed: Independent  Comment: Ind per patient report  CARE Score: 6  Discharge Goal: Independent       Oral Hygiene: Oral Hygiene  Assistance Needed: Independent  Comment:  Mod I Rinses with  mouthwash seated sinkside  CARE Score: 6  Discharge Goal: Independent    UB/LB Bathing: Shower/Bathe Self  Assistance Needed: Supervision or touching assistance  Comment: Sup  seated entirety of shower weigt shifts to wash buttocks/periarea  CARE Score: 4  Discharge Goal: Independent    UB Dressing: Upper Body Dressing  Assistance Needed: Independent  Comment: X  CARE Score: 6  Discharge Goal: Independent         LB Dressing: Lower Body Dressing  Assistance Needed: Supervision or touching assistance  Comment: Sup/Mod I in stance at sink  counter using hip for balance  CARE Score: 4  Discharge Goal: Independent    Donning and [place] Footwear: Putting On/Taking Off Footwear  Assistance Needed: Independent  Comment: X  CARE Score: 6  Discharge Goal: Independent      Toiletin [address] needed: Supervision or touching assistance  Comment: Sup to manage clothes in stance completes hygiene seated  CARE Score: 4  Discharge Goal: Independent        Toilet Transfers: Toilet Transfer  Assistance needed: Supervision or touching assistance  Comment: Sup  CARE Score: 4  Discharge Goal: Independent  Device Used:    [x]   Standard Toilet         [x]   Grab Bars           []  Bedside Commode       []   Elevated Toilet          []   Other:         Bed Mobility:           []   Pt received out of bed   Rolling R/L:    Scooting: Mod I   Supine --> Sit:  Mod I   Sit --> Supine:   Mod I     Transfers:    Sit--> Stand:  Sup  Stand --> Sit:   Sup  Stand-Pivot:   Sup  Other:    Assistive device required for transfer:   RW      Functional Mobility:  throughout room/bathroom  hop to pattern   Assistance:  SBA  Device:   [x]   Rolling Walker     []   Standard Walker []   Wheelchair        []   [place]       []   4-Wheeled Eleonore Fail         []   Cardiac Eleonore Fail       []   Other:        Homemaking Tasks:   Patient retrieves clothing from countertop in room and transports to bathroom at [employer] level       Additional Therapeutic activities/exercises completed this date:     [x]   ADL Training   [x]   Balance/Postural training     [x]   Bed/Transfer Training   [x]   Endurance Training Patient instructed in bilateral reciprocal patterned movement for 15 minutes while seated with mod/max resistance requiring no rest breaks to facilitate ADL/IADL and mobility tasks    []   Neuromuscular Re-ed   []   Nu-step:  Time:        Level:         #Steps:       []   Rebounder:    []  Seated     []  Standing        []   Supine Ther Ex (reps/sets):     []   Seated Ther Ex (reps/sets):     []   Standing Ther Ex (reps/sets):     []   Other:      Comments:      Patient/Caregiver Education and Training:   [x]   [employer] Equipment Use  [x]   Bed Mobility/Transfer Technique/Safety  [x]   Energy Conservation Tips  []   Family training  [x]   Postural Awareness  [x]   Safety During Functional Activities  []   Reinforced Patient's Precautions   []   Progress was updated and reviewed in Rehabtracker with patient and/or family this         date.     Treatment Plan for Next Session: POC to continue as tolerated        Treatment/Activity Tolerance:   [x] Tolerated treatment with no adverse effects    [] Patient limited by fatigue  [] Patient limited by pain   [] Patient limited by medical complications:    [] Adverse reaction to Tx:   [] Significant change in status    Safety:       []  bed alarm set    []  chair alarm set    []  Pt refused alarms                []  Telesitter activated      [x]  Gait belt used during tx session      []other:       Number of Minutes/Billable Intervention  Therapeutic Exercise    ADL Self-care 35   Neuro Re-Ed    Therapeutic Activity 25   Group    Other:    TOTAL 60       Social History  Social/Functional History  Lives With: Alone  Type of Home: House  Home Layout: Two level, Able to Live on Main level with bedroom/bathroom  Home Access: Ramped entrance  Bathroom Shower/Tub: Tub/Shower unit (TTB)  Bathroom Toilet: Standard  Bathroom Equipment: Tub transfer bench  Bathroom Accessibility: Accessible  Home Equipment: Walker, rolling, Baca Push  Has the patient had two or more falls in the past year or any fall with injury in the past year?: Yes (Pt with 3-4 falls in the last year with no significant injuries.)  ADL Assistance: Independent  Homemaking Assistance: Independent  Homemaking Responsibilities: Yes  Meal Prep Responsibility: Primary  Laundry Responsibility: Primary  Cleaning Responsibility: Primary  Bill Paying/Finance Responsibility: Primary  Shopping Responsibility: Primary  Dependent Care Responsibility: No  Health Care Management: Primary  Ambulation Assistance: Independent (Pt has been using 2WW for the last couple months and manual w/c for a couple days prior to admission.)  Transfer Assistance: Independent  Active : Yes  Mode of Transportation: Car  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 Pt with 3-4 falls in the last year. Objective                                                                                    Goals:  (Update in navigator)  Short Term Goals  Time Frame for Short term goals: STGs=LTGs:  Long Term Goals  Time Frame for Long term goals : 10 days or until d/c. Long Term Goal 1: Pt will complete oral care and grooming tasks c Mod I.  Long Term Goal 2: Pt will complete total body bathing c AE PRN and Mod I.  Long Term Goal 3: Pt will complete UB dressing c IND. Long Term Goal 4: Pt will complete LB dressing c AE PRN and Mod I.  Long Term Goal 5: Pt will doff/don footwear to L foot c AE PRN and Mod I.   Additional Goals?: Yes  Long Term Goal 6: Pt will complete toileting c Mod I.  Long Term Goal 7: Pt will perform functional transfers (bed, chair, toilet, shower) c DME PRN and Mod I.  Long Term Goal 8: Pt will perform therex/therax to facilitate an increase in strength/endurance (c emphasis on dynamic standing balance/tolerance > 8 mins) c Mod I.  Long Term Goal 9: Pt will complete home management tasks c Mod I.:        Plan of Care                                                                              Times per week: 5 days per week for a minimum of 60 minutes/day plus group as appropriate for 60 minutes.   Treatment to include Plan  Times per Day: Daily  Current Treatment Recommendations: Strengthening, Functional mobility training, Endurance training, Pain management, Safety education & training, Patient/Caregiver education & training, Equipment evaluation, education, & procurement, Positioning, Self-Care / ADL, Home management training, Balance training, Coordination training    Electronically signed by   HEMANT Good,  9/13/2022, 11:01 AM

## 2022-09-14 ENCOUNTER — CARE COORDINATION (OUTPATIENT)
Dept: CARE COORDINATION | Age: 42
End: 2022-09-14

## 2022-09-14 LAB
GLUCOSE BLD-MCNC: 111 MG/DL (ref 70–99)
GLUCOSE BLD-MCNC: 121 MG/DL (ref 70–99)
GLUCOSE BLD-MCNC: 140 MG/DL (ref 70–99)
GLUCOSE BLD-MCNC: 185 MG/DL (ref 70–99)

## 2022-09-14 PROCEDURE — 97542 WHEELCHAIR MNGMENT TRAINING: CPT

## 2022-09-14 PROCEDURE — 97116 GAIT TRAINING THERAPY: CPT

## 2022-09-14 PROCEDURE — 97530 THERAPEUTIC ACTIVITIES: CPT

## 2022-09-14 PROCEDURE — 97535 SELF CARE MNGMENT TRAINING: CPT

## 2022-09-14 PROCEDURE — 94761 N-INVAS EAR/PLS OXIMETRY MLT: CPT

## 2022-09-14 PROCEDURE — 97110 THERAPEUTIC EXERCISES: CPT

## 2022-09-14 PROCEDURE — 6370000000 HC RX 637 (ALT 250 FOR IP): Performed by: STUDENT IN AN ORGANIZED HEALTH CARE EDUCATION/TRAINING PROGRAM

## 2022-09-14 PROCEDURE — 82962 GLUCOSE BLOOD TEST: CPT

## 2022-09-14 PROCEDURE — 6360000002 HC RX W HCPCS: Performed by: STUDENT IN AN ORGANIZED HEALTH CARE EDUCATION/TRAINING PROGRAM

## 2022-09-14 PROCEDURE — 99211 OFF/OP EST MAY X REQ PHY/QHP: CPT

## 2022-09-14 PROCEDURE — 1280000000 HC REHAB R&B

## 2022-09-14 PROCEDURE — 99232 SBSQ HOSP IP/OBS MODERATE 35: CPT | Performed by: PHYSICAL MEDICINE & REHABILITATION

## 2022-09-14 RX ADMIN — ATORVASTATIN CALCIUM 40 MG: 40 TABLET, FILM COATED ORAL at 20:27

## 2022-09-14 RX ADMIN — OXYCODONE HYDROCHLORIDE 10 MG: 10 TABLET ORAL at 15:57

## 2022-09-14 RX ADMIN — DOCUSATE SODIUM 100 MG: 100 CAPSULE, LIQUID FILLED ORAL at 08:27

## 2022-09-14 RX ADMIN — METOPROLOL SUCCINATE 25 MG: 25 TABLET, EXTENDED RELEASE ORAL at 08:27

## 2022-09-14 RX ADMIN — SUCRALFATE 1 G: 1 TABLET ORAL at 08:27

## 2022-09-14 RX ADMIN — ENOXAPARIN SODIUM 40 MG: 40 INJECTION SUBCUTANEOUS at 08:28

## 2022-09-14 RX ADMIN — FLUOXETINE 80 MG: 20 CAPSULE ORAL at 08:27

## 2022-09-14 RX ADMIN — PANTOPRAZOLE SODIUM 20 MG: 20 TABLET, DELAYED RELEASE ORAL at 06:17

## 2022-09-14 RX ADMIN — OXYCODONE HYDROCHLORIDE 10 MG: 10 TABLET ORAL at 08:27

## 2022-09-14 RX ADMIN — OXYCODONE HYDROCHLORIDE 10 MG: 10 TABLET ORAL at 22:54

## 2022-09-14 RX ADMIN — INSULIN GLARGINE 30 UNITS: 100 INJECTION, SOLUTION SUBCUTANEOUS at 20:33

## 2022-09-14 RX ADMIN — LOSARTAN POTASSIUM 25 MG: 25 TABLET, FILM COATED ORAL at 08:27

## 2022-09-14 RX ADMIN — SUCRALFATE 1 G: 1 TABLET ORAL at 20:27

## 2022-09-14 ASSESSMENT — PAIN SCALES - GENERAL
PAINLEVEL_OUTOF10: 3
PAINLEVEL_OUTOF10: 2
PAINLEVEL_OUTOF10: 2
PAINLEVEL_OUTOF10: 0
PAINLEVEL_OUTOF10: 7
PAINLEVEL_OUTOF10: 4
PAINLEVEL_OUTOF10: 3
PAINLEVEL_OUTOF10: 2
PAINLEVEL_OUTOF10: 3
PAINLEVEL_OUTOF10: 4
PAINLEVEL_OUTOF10: 6

## 2022-09-14 ASSESSMENT — PAIN DESCRIPTION - ONSET
ONSET: GRADUAL
ONSET: ON-GOING
ONSET: GRADUAL

## 2022-09-14 ASSESSMENT — PAIN DESCRIPTION - DESCRIPTORS
DESCRIPTORS: ACHING;BURNING
DESCRIPTORS: ACHING

## 2022-09-14 ASSESSMENT — PAIN DESCRIPTION - PAIN TYPE
TYPE: ACUTE PAIN
TYPE: SURGICAL PAIN
TYPE: ACUTE PAIN

## 2022-09-14 ASSESSMENT — PAIN - FUNCTIONAL ASSESSMENT
PAIN_FUNCTIONAL_ASSESSMENT: PREVENTS OR INTERFERES SOME ACTIVE ACTIVITIES AND ADLS

## 2022-09-14 ASSESSMENT — PAIN DESCRIPTION - LOCATION
LOCATION: HIP
LOCATION: LEG

## 2022-09-14 ASSESSMENT — PAIN DESCRIPTION - ORIENTATION
ORIENTATION: RIGHT

## 2022-09-14 ASSESSMENT — PAIN SCALES - WONG BAKER
WONGBAKER_NUMERICALRESPONSE: 2
WONGBAKER_NUMERICALRESPONSE: 4
WONGBAKER_NUMERICALRESPONSE: 2
WONGBAKER_NUMERICALRESPONSE: 4
WONGBAKER_NUMERICALRESPONSE: 2

## 2022-09-14 ASSESSMENT — PAIN DESCRIPTION - FREQUENCY
FREQUENCY: INTERMITTENT

## 2022-09-14 NOTE — CARE COORDINATION
Received return call from Pt's brother (emergency contact), JT stating Pt reported to [place] WOLFE that Geovany Mclaughlin does not wish to work towards moving to [facility] until the house is sold. ROSIET reported Chino Cyr did state Pt will receive home health several days / week and meals on wheels upon returning home. SW discussed plans to contact Pt to discuss referrals for AL waiver and [place]. Attempted phone call to Pt. No answer, voicemail message left requesting return phone call. KOLE plan of care:  SW will await return call from Pt to discuss referrals for [place] and [address]. SW will make attempt to reach Pt on 9/16 if this SW does not hear back before then.

## 2022-09-14 NOTE — PROGRESS NOTES
Physical Rehabilitation: OCCUPATIONAL THERAPY     [x] daily progress note       [] discharge       Patient Name:  Severa Savory   :  1980 MRN: [id number]  Room:  [address] [place], [place] [address] Date of Admission: 2022  Rehabilitation Diagnosis:   Necrotizing fasciitis [M72.6]  Necrotizing fasciitis ([address] [M72.6]       Date 2022       Day of ARU Week:  7   Time IN/OUT 1350/1425   Individual Tx Minutes 35   Group Tx Minutes    Co-Treat Minutes    Concurrent Tx Minutes    TOTAL Tx Time Mins 35   Variance Time    Variance Time []   Refusal due to:     []   Medical hold/reason:    []   Illness   []   Off Unit for test/procedure  []   Extra time needed to complete task  []   Therapeutic need  []   Other (specify):   Restrictions Restrictions/Precautions: General Precautions, Fall Risk, Weight Bearing         Communication with other providers: [x]   OK to see per nursing:     []   Spoke with team member regarding:      Subjective observations and cognitive status:   Patient up in recliner sleeping, easily awakened and pleasant, agreeable to therapy    Pain level/location:    /10       Location: per patient no pain noted    Discharge recommendations  Anticipated discharge date:    Destination: []home alone   [x]home alone w assist prn   [] home w/ family    [] Continuous supervision       []SNF    [] Assisted living     [] Other:   Continued therapy: [x]HHC OT  []OUTPATIENT  OT   [] No Further OT  Equipment needs: None      Toiletin [address] needed: Supervision or touching assistance  Comment: Sup to manage clothes in stance completes hygiene seated  CARE Score: 4  Discharge Goal: Independent      Toilet Transfers:     Toilet Transfer  Assistance needed: Supervision or touching assistance  Comment: Sup  CARE Score: 4  Discharge Goal: Independent  Device Used:    [x]   Standard Toilet         [x]   Sam Cage           []  Bedside Commode       []   Elevated Toilet          []   Other:        Bed Mobility:           [x]   Pt received out of bed   Rolling R/L:    Scooting:    Supine --> Sit:    Sit --> Supine:      Transfers:    Sit--> Stand: Mod I   Stand --> Sit: Sup for reaching back for chair   Stand-Pivot:   SUp  Other:    Assistive device required for transfer:   RW      Functional Mobility:  from room to therapy gym door   Assistance:  Sup  Device:   [x]   Rolling Walker     []   Standard Eletha Bouquet []   Wheelchair        []   [employer]       []   4-Wheeled Eletha Bouquet         []   Cardiac Walker       []   Other:          Additional Therapeutic activities/exercises completed this date:     []   ADL Training   [x]   Balance/Postural training     [x]   Bed/Transfer Training   [x]   Endurance Training Patient instructed in bilateral reciprocal patterned movement for 10 minutes with max resistance while seated with no rest breaks to increase strength and endurance to facilitate ADL and mobility tasks   []   Neuromuscular Re-ed   []   Nu-step:  Time:        Level:         #Steps:       []   Rebounder:    []  Seated     []  Standing        []   Supine Ther Ex (reps/sets):     []   Seated Ther Ex (reps/sets):     []   Standing Ther Ex (reps/sets):     [x]   Other: Therapist makes call to [employer] [facility] and leaves VM to return call to either cell number or ARU 2* to patient misplacing small foam that fits into end of ampu  sheild waiting for       Comments:      Patient/Caregiver Education and Training:   [x]   Adaptive Equipment Use  [x]   Bed Mobility/Transfer Technique/Safety  []   Energy Conservation Tips  [x]   Family training  [x]   Postural Awareness  [x]   Safety During Functional Activities  []   Reinforced Patient's Precautions   []   Progress was updated and reviewed in Rehabtracker with patient and/or family this         date.     Treatment Plan for Next Session: POC to continue as tolerated           Treatment/Activity Tolerance:   [x] Tolerated treatment with no adverse effects    [] Patient limited by fatigue  [] Patient limited by pain   [] Patient limited by medical complications:    [] Adverse reaction to Tx:   [] Significant change in status    Safety:       []  bed alarm set    [x]  chair alarm set    []  Pt refused alarms                []  Telesitter activated      []  Gait belt used during tx session      []other:       Number of Minutes/Billable Intervention  Therapeutic Exercise    ADL Self-care 15   Neuro Re-Ed    Therapeutic Activity 20   Group    Other:    TOTAL 35       Social History  Social/Functional History  Lives With: Alone  Type of Home: House  Home Layout: Two level, Able to Live on Main level with bedroom/bathroom  Home Access: Ramped entrance  Bathroom Shower/Tub: Tub/Shower unit (TTB)  Bathroom Toilet: Standard  Bathroom Equipment: Tub transfer bench  Bathroom Accessibility: Accessible  Home Equipment: Amarjit Blayne, rolling, [place]  Has the patient had two or more falls in the past year or any fall with injury in the past year?: Yes (Pt with 3-4 falls in the last year with no significant injuries.)  ADL Assistance: Independent  Homemaking Assistance: Independent  Homemaking Responsibilities: Yes  Meal Prep Responsibility: Primary  Laundry Responsibility: Primary  Cleaning Responsibility: Primary  Bill Paying/Finance Responsibility: Primary  Shopping Responsibility: Primary  Dependent Care Responsibility: No  Health Care Management: Primary  Ambulation Assistance: Independent (Pt has been using 2WW for the last couple months and manual w/c for a couple days prior to admission.)  Transfer Assistance: Independent  Active : Yes  Mode of Transportation: Car  Occupation: On disability  Leisure & Hobbies: Yardwork  Additional Comments: Pt has regular flat bed and couch that he sleeps on. Pt with 3-4 falls in the last year.     Objective                                                                                    Goals:  (Update in navigator)  Short Term Goals  Time Frame for Short term goals: STGs=LTGs:  Long Term Goals  Time Frame for Long term goals : 10 days or until d/c. Long Term Goal 1: Pt will complete oral care and grooming tasks c Mod I.  Long Term Goal 2: Pt will complete total body bathing c AE PRN and Mod I.  Long Term Goal 3: Pt will complete UB dressing c IND. Long Term Goal 4: Pt will complete LB dressing c AE PRN and Mod I.  Long Term Goal 5: Pt will doff/don footwear to L foot c AE PRN and Mod I. Additional Goals?: Yes  Long Term Goal 6: Pt will complete toileting c Mod I.  Long Term Goal 7: Pt will perform functional transfers (bed, chair, toilet, shower) c DME PRN and Mod I.  Long Term Goal 8: Pt will perform therex/therax to facilitate an increase in strength/endurance (c emphasis on dynamic standing balance/tolerance > 8 mins) c Mod I.  Long Term Goal 9: Pt will complete home management tasks c Mod I.:        Plan of Care                                                                              Times per week: 5 days per week for a minimum of 60 minutes/day plus group as appropriate for 60 minutes.   Treatment to include Plan  Times per Day: Daily  Current Treatment Recommendations: Strengthening, Functional mobility training, Endurance training, Pain management, Safety education & training, Patient/Caregiver education & training, Equipment evaluation, education, & procurement, Positioning, Self-Care / ADL, Home management training, Balance training, Coordination training    Electronically signed by   HEMANT Gómez,  9/14/2022, 6:54 AM

## 2022-09-14 NOTE — PLAN OF CARE
Problem: Discharge Planning  Goal: Discharge to home or other facility with appropriate resources  9/13/2022 1626 by Marga Craig RN  Outcome: Progressing     Problem: Skin/Tissue Integrity  Goal: Absence of new skin breakdown  Description: 1. Monitor for areas of redness and/or skin breakdown  2. Assess vascular access sites hourly  3. Every 4-6 hours minimum:  Change oxygen saturation probe site  4. Every 4-6 hours:  If on nasal continuous positive airway pressure, respiratory therapy assess nares and determine need for appliance change or resting period.   9/14/2022 0132 by Mirian Carlson RN  Outcome: Progressing  9/13/2022 1626 by Marga Craig RN  Outcome: Progressing     Problem: Safety - Adult  Goal: Free from fall injury  9/14/2022 0132 by Mirian Carlson RN  Outcome: Progressing  9/13/2022 1626 by Marga Craig RN  Outcome: Progressing     Problem: Chronic Conditions and Co-morbidities  Goal: Patient's chronic conditions and co-morbidity symptoms are monitored and maintained or improved  9/13/2022 1626 by Marga Craig RN  Outcome: Progressing     Problem: ABCDS Injury Assessment  Goal: Absence of physical injury  9/13/2022 1626 by Marga Craig RN  Outcome: Progressing     Problem: Nutrition Deficit:  Goal: Optimize nutritional status  9/14/2022 0132 by Mirian Carlson RN  Outcome: Progressing  9/13/2022 1626 by Marga Craig RN  Outcome: Progressing     Problem: Pain  Goal: Verbalizes/displays adequate comfort level or baseline comfort level  9/13/2022 1626 by Marga Craig RN  Outcome: Progressing

## 2022-09-14 NOTE — CARE COORDINATION
Received return phone call from Pt who reported he does not want to go to a facility, stating he doesn't have a refrigerator, but does have a small one that hold beverages. Discussed with Pt, referral to [facility] and provided education on what services they are able to provide. Pt was in agreement with [facility] referral, requesting to call this SW back at a later date as his nurse had just arrived in the room to check on him. KOLE plan of care:  SW will make referral to [facility]. SW will contact Pt on 9/16 to discuss referral to [address] [place] [address].

## 2022-09-14 NOTE — PROGRESS NOTES
Occupational Therapy  Physical Rehabilitation: OCCUPATIONAL THERAPY     [x] daily progress note       [] discharge       Patient Name:  Madeleine Wesley   :  1980 MRN: [id number]  Room:  [address] [place], [place] [address] Date of Admission: 2022  Rehabilitation Diagnosis:   Necrotizing fasciitis [M72.6]  Necrotizing fasciitis ([place]) [M72.6]       Date 2022       Day of ARU Week:  7   Time IN/OUT 1053/1153   Individual Tx Minutes 60   Group Tx Minutes    Co-Treat Minutes    Concurrent Tx Minutes    TOTAL Tx Time Mins 60   Variance Time    Variance Time []   Refusal due to:     []   Medical hold/reason:    []   Illness   []   Off Unit for test/procedure  []   Extra time needed to complete task  []   Therapeutic need  []   Other (specify):   Restrictions Restrictions/Precautions: General Precautions, Fall Risk, Weight Bearing         Communication with other providers: [x]   OK to see per nursing:     []   Spoke with team member regarding:      Subjective observations and cognitive status: Pt in recliner on approach, declining ADLs as full shower was just performed yesterday.        Pain level/location:    /10       Location: Denied   Discharge recommendations  Anticipated discharge date:    Destination: []home alone   [x]home alone w assist prn   [] home w/ family    [] Continuous supervision       []SNF    [] Assisted living     [] Other:   Continued therapy: []HHC OT  []OUTPATIENT  OT   [x] No Further OT  Equipment needs: None, has BSC and TTB     Toileting:   Supervision      Toilet Transfers:   Supervision to<>from W/C  Device Used:    []   Standard Toilet         [x]   Grab Bars           []  Bedside Commode       []   Elevated Toilet          []   Other:        Bed Mobility:           [x]   Pt received out of bed        Transfers:    Sit--> Stand:  Supervision  Stand --> Sit:   Supervision  Stand-Pivot:   Supervision  Other:    Assistive device required for transfer:   W/C      Functional Mobility:    Assistance: Mod I ~400 ft total during session  Device:   []   [place]     []   Standard Walker [x]   Wheelchair        []   [employer]       []   4-Wheeled Jordi Sciara         []   Cardiac Jordi Sciara       []   Other:        Homemaking Tasks:   Discussed med management as pt self reported some difficulty recalling to take meds at baseline. Pt reports he has a pillbox and the issue isn't setting up the pillbox, it's the recall to take the meds. Visually educated pt on Iphone  how to set recurring alarms on phone to assist with compliance. Educated pt on recommendation to complete kitchen tasks from W/C level. Pt appropriately positioned and locked W/C without cues to retrieve item from upper cupboard and transport at a mod I level     Additional Therapeutic activities/exercises completed this date:     []   ADL Training   [x]   Balance/Postural training: Pt stood x 3 min total with one seated rest break at counter with SBA while completing dynamic stand task while reaching outside base of support to facilitate increased balance for ADL tasks and transfers. [x]   Bed/Transfer Training   [x]   Endurance Training   []   Neuromuscular Re-ed   []   Nu-step:  Time:        Level:         #Steps:       []   Rebounder:    []  Seated     []  Standing        []   Supine Ther Ex (reps/sets):     [x]   Seated Ther Ex (reps/sets): To increase BUE endurance for ADLs and transfers pt completed arm ergometer on mod resistance x15 mins, 0 rest breaks, average 30 RPM    To increase BUE endurance for ADLs and transfers pt completed 6 sets x10 reps on [employer] initially with 44#, increasing to 48.8# final 2 sets           Comments: All intervention performed to increase pt's strength, endurance, ax tolerance, and balance in prep for increased I c ADL/IADLs and functional transfers/mobility.        Patient/Caregiver Education and Training:   []   [employer] Equipment Use  [x]   Bed Mobility/Transfer Technique/Safety  []   Energy Conservation Tips  []   Family training  [x]   Postural Awareness  [x]   Safety During Functional Activities  []   Reinforced Patient's Precautions   []   Progress was updated and reviewed in Rehabtracker with patient and/or family this         date. Treatment Plan for Next Session: Continue OT POC    Assessment: This pt demonstrated a positive  response to today's treatment as evidenced by improving endurance. The patient is making  progress toward established goals as evidenced by QI scores. Ongoing deficits are observed in the areas of balance and continued focus on this is recommended.        Treatment/Activity Tolerance:   [x] Tolerated treatment with no adverse effects    [] Patient limited by fatigue  [] Patient limited by pain   [] Patient limited by medical complications:    [] Adverse reaction to Tx:   [] Significant change in status    Safety:       []  bed alarm set    [x]  chair alarm set    []  Pt refused alarms                []  Telesitter activated      [x]  Gait belt used during tx session      []other:       Number of Minutes/Billable Intervention  Therapeutic Exercise 30   ADL Self-care    Neuro Re-Ed    Therapeutic Activity 30   Group    Other:    TOTAL 60       Social History  Social/Functional History  Lives With: Alone  Type of Home: House  Home Layout: Two level, Able to Live on Main level with bedroom/bathroom  Home Access: Ramped entrance  Bathroom Shower/Tub: Tub/Shower unit (TTB)  Bathroom Toilet: Standard  Bathroom Equipment: Tub transfer bench  Bathroom Accessibility: Accessible  Home Equipment: Walker, rolling, [place]  Has the patient had two or more falls in the past year or any fall with injury in the past year?: Yes (Pt with 3-4 falls in the last year with no significant injuries.)  ADL Assistance: Independent  Homemaking Assistance: Independent  Homemaking Responsibilities: Yes  Meal Prep Responsibility: Primary  Laundry Responsibility: Primary  Cleaning Responsibility: Primary  Bill Paying/Finance Responsibility: Primary  Shopping Responsibility: Primary  Dependent Care Responsibility: No  Health Care Management: Primary  Ambulation Assistance: Independent (Pt has been using 2WW for the last couple months and manual w/c for a couple days prior to admission.)  Transfer Assistance: Independent  Active : Yes  Mode of Transportation: Car  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 Pt with 3-4 falls in the last year. Objective                                                                                    Goals:  (Update in navigator)  Short Term Goals  Time Frame for Short term goals: STGs=LTGs:  Long Term Goals  Time Frame for Long term goals : 10 days or until d/c. Long Term Goal 1: Pt will complete oral care and grooming tasks c Mod I.  Long Term Goal 2: Pt will complete total body bathing c AE PRN and Mod I.  Long Term Goal 3: Pt will complete UB dressing c IND. Long Term Goal 4: Pt will complete LB dressing c AE PRN and Mod I.  Long Term Goal 5: Pt will doff/don footwear to L foot c AE PRN and Mod I. Additional Goals?: Yes  Long Term Goal 6: Pt will complete toileting c Mod I.  Long Term Goal 7: Pt will perform functional transfers (bed, chair, toilet, shower) c DME PRN and Mod I.  Long Term Goal 8: Pt will perform therex/therax to facilitate an increase in strength/endurance (c emphasis on dynamic standing balance/tolerance > 8 mins) c Mod I.  Long Term Goal 9: Pt will complete home management tasks c Mod I.:        Plan of Care                                                                              Times per week: 5 days per week for a minimum of 60 minutes/day plus group as appropriate for 60 minutes.   Treatment to include Plan  Times per Day: Daily  Current Treatment Recommendations: Strengthening, Functional mobility training, Endurance training, Pain management, Safety education & training, Patient/Caregiver education & training, Equipment evaluation, education, & procurement, Positioning, Self-Care / ADL, Home management training, Balance training, Coordination training    Electronically signed by   Hillary Holden MS, OTR/L  License #[id number]  9/14/2022, 11:15 AM

## 2022-09-14 NOTE — CONSULTS
[address] [id number] Continence Nurse  Consult Note       Jeremi Daley  AGE: 39 y.o.    GENDER: male  : 1980  TODAY'S DATE:  2022    Subjective:     Reason for CWOCN Evaluation and Assessment: wound reassessment      Jeremi Daley is a 39 y.o. male referred by:   [x] Physician  [] Nursing  [] Other:     Wound Identification:  Wound Type: diabetic, non-healing surgical, and surgical incision  Contributing Factors: diabetes, poor glucose control, and decreased mobility        PAST MEDICAL HISTORY        Diagnosis Date    Abscess of left foot 2022    Anemia associated with acute blood loss 2022    Callus of foot     Right foot - see's Dr. Beatrice Dukes    Cellulitis of left foot 2022    Diabetic ulcer of left midfoot associated with type 2 diabetes mellitus, with muscle involvement without evidence of necrosis ([address] 2022    Diabetic ulcer of left midfoot associated with type 2 diabetes mellitus, with necrosis of muscle ([address] 2021    Diabetic ulcer of right midfoot associated with type 2 diabetes mellitus, with fat layer exposed ([address] 2020    Dizziness     positional    Essential hypertension     Follows with PCP    Hyperlipidemia     Lumbar radiculopathy     Septic embolism ([address] 2020    Subacute osteomyelitis of left foot ([address] 2022       PAST SURGICAL HISTORY    Past Surgical History:   Procedure Laterality Date    ACHILLES TENDON SURGERY Right 2021    RIGHT ACHILLES TENDON LENGTHENING REPAIR performed by Pawel Singh DPM at [facility]  2018    [facility]    DENTAL SURGERY      teeth extractions -half per patient    HERNIA REPAIR Bilateral 2020    BIALTERAL HERNIA INGUINAL REPAIR performed by Aisha Borges MD at [address] Right 2022    LEG AMPUTATION BELOW KNEE performed by Lukasz Ibarra MD at [address] Right 2022    LEG AMPUTATION BELOW KNEE REVISION days 414 mL 0    glyBURIDE (DIABETA) 5 MG tablet Take 2 tablets by mouth in the morning and at bedtime 120 tablet 1    Dulaglutide 1.5 MG/0.5ML SOPN Inject 1.5 mg into the skin once a week 5 pen 3    metoprolol succinate (TOPROL XL) 25 MG extended release tablet Take 1 tablet by mouth in the morning. 90 tablet 1    losartan (COZAAR) 25 MG tablet Take 1 tablet by mouth in the morning. 90 tablet 1    [DISCONTINUED] omeprazole (PRILOSEC) 20 MG delayed release capsule Take 1 capsule by mouth once daily in the morning 90 capsule 1    FLUoxetine (PROZAC) 40 MG capsule Take 2 capsules by mouth in the morning. 30 capsule 3    insulin glargine (LANTUS SOLOSTAR) 100 UNIT/ML injection pen Inject 30 Units into the skin nightly 5 pen 2    collagenase 250 UNIT/GM ointment Per instructions DAILY (route: topical)      Alcohol Swabs PADS       pioglitazone (ACTOS) 30 MG tablet Take 1 tablet by mouth daily 90 tablet 1    atorvastatin (LIPITOR) 40 MG tablet Take 1 tablet by mouth nightly 90 tablet 1    blood glucose monitor strips Test 2 times a day & as needed for symptoms of irregular blood glucose. Dispense sufficient amount for indicated testing frequency plus additional to accommodate PRN testing needs. 100 strip 0    blood glucose monitor kit and supplies Dispense sufficient amount for indicated testing frequency plus additional to accommodate PRN testing needs. Dispense all needed supplies to include: monitor, strips, lancing device, lancets, control solutions, alcohol swabs.  1 kit 0    FreeStyle Lancets MISC 1 each by Does not apply route daily 100 each 3    [DISCONTINUED] acetaminophen (TYLENOL) 325 MG tablet Take 2 tablets by mouth every 4 hours as needed for Pain or Fever 120 tablet 3         Objective:      /83   Pulse 70   Temp 98.1 °F (36.7 °C) (Oral)   Resp 18   Ht 6' (1.829 m)   Wt 173 lb 15.1 oz (78.9 kg)   SpO2 97%   BMI 23.59 kg/m²   Heraclio Risk Score: Heraclio Scale Score: 18    LABS    CBC:   Lab Results   Component Value Date/Time    WBC 11.6 09/08/2022 11:07 AM    RBC 4.05 09/08/2022 11:07 AM    HGB 11.8 09/08/2022 11:07 AM    HCT 36.9 09/08/2022 11:07 AM    MCV 91.1 09/08/2022 11:07 AM    MCH 29.1 09/08/2022 11:07 AM    MCHC 32.0 09/08/2022 11:07 AM    RDW 14.8 09/08/2022 11:07 AM     09/08/2022 11:07 AM    MPV 8.7 09/08/2022 11:07 AM     CMP:    Lab Results   Component Value Date/Time     09/12/2022 04:31 AM    K 3.7 09/12/2022 04:31 AM    K 3.8 03/14/2018 06:01 AM    CL 99 09/12/2022 04:31 AM    CO2 30 09/12/2022 04:31 AM    BUN 14 09/12/2022 04:31 AM    CREATININE 1.7 09/12/2022 04:31 AM    GFRAA 54 09/12/2022 04:31 AM    AGRATIO 1.4 03/12/2020 08:26 AM    LABGLOM 45 09/12/2022 04:31 AM    GLUCOSE 130 09/12/2022 04:31 AM    PROT 6.4 09/04/2022 04:08 AM    LABALBU 3.1 09/12/2022 04:31 AM    CALCIUM 8.7 09/12/2022 04:31 AM    BILITOT 0.3 09/04/2022 04:08 AM    ALKPHOS 73 09/04/2022 04:08 AM    AST 24 09/04/2022 04:08 AM    ALT 15 09/04/2022 04:08 AM     Albumin:    Lab Results   Component Value Date/Time    LABALBU 3.1 09/12/2022 04:31 AM     PT/INR:    Lab Results   Component Value Date/Time    PROTIME 11.5 05/20/2022 11:04 PM    INR 0.89 05/20/2022 11:04 PM     HgBA1c:    Lab Results   Component Value Date/Time    LABA1C 10.5 07/28/2022 10:45 AM         Assessment:     Patient Active Problem List   Diagnosis    Unstable angina (HCC)    Lumbar back pain with radiculopathy affecting left lower extremity    S/P lumbar laminectomy    Type 2 diabetes mellitus, with long-term current use of insulin (HCC)    Essential hypertension    Gastroesophageal reflux disease without esophagitis    Chest pain    Moderate nonproliferative diabetic retinopathy of both eyes without macular edema associated with type 2 diabetes mellitus (HCC)    Acute osteomyelitis ([address]    Acute osteomyelitis of foot ([address]    Abscess of left foot    Cellulitis of left foot    Subacute osteomyelitis of left foot ([address]    Acute blood loss anemia    Diabetic ulcer of left midfoot associated with type 2 diabetes mellitus, with muscle involvement without evidence of necrosis ([address]    Anxiety attack    WD-Diabetic ulcer of left midfoot associated with type 2 diabetes mellitus, with fat layer exposed ([address]    Diabetic ulcer of toe of left foot associated with type 2 diabetes mellitus, with fat layer exposed ([address]    WD-Partial nontraumatic amputation of foot, left (HCC)    Severe episode of recurrent major depressive disorder, without psychotic features ([address]    Insomnia    Intractable vomiting with nausea    Intractable nausea and vomiting    Necrotizing fasciitis ([address]    Sepsis ([address]    Pneumatosis coli    Type 2 diabetes mellitus with right diabetic foot infection ([address]    Necrotizing fasciitis of lower leg ([address]    Below knee amputation ([address]    Status post below knee amputation, left ([address]    Uncontrolled pain    Generalized weakness    Uncontrolled type 2 diabetes mellitus with peripheral neuropathy ([address]    Depression with anxiety       Measurements:  Negative Pressure Wound Therapy (Active)   Number of days: 112       Incision 04/17/18 Back (Active)   Number of days: 1611       Wound 05/11/22 #1 (onset 2 weeks) Left Medial Foot Amp Site (Active)   Wound Image   09/14/22 0830   Wound Etiology Non-Healing Surgical 09/14/22 0830   Dressing Status New dressing applied 09/14/22 0830   Wound Cleansed Cleansed with saline 09/14/22 0830   Dressing/Treatment ABD;Collagen;Hydrofiber Ag;Roll gauze 09/14/22 0830   Offloading for Diabetic Foot Ulcers Diabetic shoes/inserts 09/10/22 2059   Dressing Change Due 09/07/22 09/09/22 2154   Wound Length (cm) 2 cm 09/14/22 0830   Wound Width (cm) 0.2 cm 09/14/22 0830   Wound Depth (cm) 0.1 cm 09/14/22 0830   Wound Surface Area (cm^2) 0.4 cm^2 09/14/22 0830   Change in Wound Size % (l*w) 99.22 09/14/22 0830   Wound Volume (cm^3) 0.04 cm^3 09/14/22 0830   Wound Healing % 100 09/14/22 0830   Post-Procedure Length (cm) 4 cm 08/30/22 1343   Post-Procedure Width (cm) 1 cm 08/30/22 1343   Post-Procedure Depth (cm) 0.6 cm 08/30/22 1343   Post-Procedure Surface Area (cm^2) 4 cm^2 08/30/22 1343   Post-Procedure Volume (cm^3) 2.4 cm^3 08/30/22 1343   Distance Tunneling (cm) 0 cm 09/14/22 0830   Tunneling Position ___ O'Clock 0 09/14/22 0830   Undermining Starts ___ O'Clock 0 09/14/22 0830   Undermining Ends___ O'Clock 0 09/14/22 0830   Undermining Maxium Distance (cm) 0 09/14/22 0830   Wound Assessment Pink/red 09/14/22 0830   Drainage Amount Small 09/14/22 0830   Drainage Description Serous 09/14/22 0830   Odor None 09/14/22 0830   Rosalie-wound Assessment Hyperkeratosis (callous) 09/14/22 0830   Margins Defined edges 09/14/22 0830   Wound Thickness Description not for Pressure Injury Full thickness 09/14/22 0830   Number of days: 126       Wound 05/11/22 #2 (onset unknown) Left Plantar (Active)   Wound Image   09/14/22 0830   Wound Etiology Diabetic 09/14/22 0830   Dressing Status New dressing applied 09/14/22 0830   Wound Cleansed Cleansed with saline 09/14/22 0830   Dressing/Treatment ABD;Collagen;Hydrofiber Ag;Roll gauze 09/14/22 0830   Offloading for Diabetic Foot Ulcers Diabetic shoes/inserts 09/12/22 2153   Dressing Change Due 09/04/22 09/07/22 1034   Wound Length (cm) 1.5 cm 09/14/22 0830   Wound Width (cm) 0.8 cm 09/14/22 0830   Wound Depth (cm) 0.2 cm 09/14/22 0830   Wound Surface Area (cm^2) 1.2 cm^2 09/14/22 0830   Change in Wound Size % (l*w) 25 09/14/22 0830   Wound Volume (cm^3) 0.24 cm^3 09/14/22 0830   Wound Healing % 25 09/14/22 0830   Post-Procedure Length (cm) 1 cm 08/30/22 1343   Post-Procedure Width (cm) 1.5 cm 08/30/22 1343   Post-Procedure Depth (cm) 1 cm 08/30/22 1343   Post-Procedure Surface Area (cm^2) 1.5 cm^2 08/30/22 1343   Post-Procedure Volume (cm^3) 1.5 cm^3 08/30/22 1343   Distance Tunneling (cm) 0 cm 09/14/22 0830   Tunneling Position ___ O'Clock 0 09/14/22 0830   Undermining Starts ___ O'Clock 0 09/14/22 0830   Undermining Ends___ O'Clock 0 09/14/22 0830   Undermining Maxium Distance (cm) 0 09/14/22 0830   Wound Assessment Pink/red 09/14/22 0830   Drainage Amount Small 09/14/22 0830   Drainage Description Serous 09/14/22 0830   Odor None 09/14/22 0830   Rosalie-wound Assessment Hyperkeratosis (callous) 09/14/22 0830   Margins Defined edges 09/14/22 0830   Wound Thickness Description not for Pressure Injury Full thickness 09/14/22 0830   Number of days: 126       Response to treatment:  Well tolerated by patient. Pain Assessment:  Severity:  mild  Quality of pain: sharp  Wound Pain Timing/Severity: intermittent  Premedicated: yes    Plan:     Plan of Care: Wound 05/11/22 #1 (onset 2 weeks) Left Medial Foot Amp Site-Dressing/Treatment: ABD, Collagen, Hydrofiber Ag, Roll gauze  Wound 05/11/22 #2 (onset unknown) Left Plantar-Dressing/Treatment: ABD, Collagen, Hydrofiber Ag, Roll gauze    Pt in bed. Agreeable to wound reassessment. Right BKA with brace currently off. Dressing removed. No drainage. Incision well approximated with staples. Xeroform/abd/kerlix applied in figure 8 pattern. Left foot non healing surgical amp site stable and left plantar diabetic wound stable as well. Continue current treatment. Applied as above. Stated hopefully will discharge home on Saturday and plans to follow up in outpatient wound clinic and in office with Dr. Shayan Gardiner. Pt is a mild risk for skin breakdown AEB Heraclio. Follow Heraclio orders. Pictures and measurements were taken. Specialty Bed Required : yes  [] Low Air Loss   [x] Pressure Redistribution  [] Fluid Immersion  [] Bariatric  [] Total Pressure Relief  [] Other:     Discharge Plan:  Placement for patient upon discharge: home  Hospice Care: na  Patient appropriate for Outpatient [address]: active    Patient/Caregiver Teaching:  Level of patient/caregiver understanding able to:   Voiced understanding.         Electronically signed by Radha Rushing Juli Galicia on 9/14/2022 at 12:41 PM

## 2022-09-14 NOTE — PROGRESS NOTES
Physical Therapy  [x] daily progress note       [] discharge       Patient Name:  Leonardo Leblanc   :  1980 MRN: [id number]  Room:  [address] [place] [address] Date of Admission: 2022  Rehabilitation Diagnosis:   Necrotizing fasciitis [M72.6]  Necrotizing fasciitis ([address] [M72.6]       Date 2022       Day of ARU Week:  7   Time IN//930, 1517/1602   Individual Tx Minutes 40+45   Group Tx Minutes    Co-Treat Minutes    Concurrent Tx Minutes    TOTAL Tx Time Mins 85   Variance Time -5(OT reported +5)   Variance Time []   Refusal due to:     []   Medical hold/reason:    []   Illness   []   Off Unit for test/procedure  []   Extra time needed to complete task  []   Therapeutic need  []   Other (specify):   Restrictions Restrictions/Precautions  Restrictions/Precautions: General Precautions, Fall Risk, Weight Bearing  Required Braces or Orthoses?: Yes (Pt with HARSHAL Willoughby.)  Position Activity Restriction  Other position/activity restrictions: IV access to [place] communication [x]   Cleared for therapy per nursing     []   RN notified about issues during session  []   RN updated on pt performance  []   Spoke with   []   Spoke with OT  []   Spoke with MD  []   Other:    Subjective observations and cognitive status: Pt in bed, eager for therapy. Pt donned cast shoe and stated \"it doesn't feel right\". Allowed pt time to problem solve, but he could not identify that he had only strapped one of two straps. Once PT identified, pt fastened.    PM: pt in recliner, agreeable to therapy     Pain level/location:   0 /10       Location:    Discharge recommendations  Anticipated discharge date:    Destination: []home alone   [x]home alone with assist PRN     [] home w/ family      [] Continuous supervision  []SNF    [] Assisted living     [] Other:  Continued therapy: [x]HHC PT  []OUTPATIENT  PT   [] No Further PT  []SNF PT  Caregiver training recommended: []Yes  [] No   Equipment needs: Pt has 2ww and w/c     Bed Mobility:           []   Pt received out of bed   Rolling R/L:  mod I  Lying --> Sit:  mod I  Sit --> lying:  mod I  Bed features used: [] Yes  [x] No       Transfers:    Sit--> Stand:  mod I  Stand --> Sit:   mod I  Chair-->Bed/Bed --> Chair:   supervision with cues for set up at times with overrotation on pivot      Gait:    Distance:  50x2, 20 x2, focus on short gait for household independence    Assistance:  21' with mod I, no obstacles, 48' with supervision due to fatigue with low foot clearance  Device:  2ww  Gait Quality:  pt with good awareness of foot and walker placement    Wheelchair Propulsion:  Distance:  150', 300'   Assistance:  mod I  Extremities Used:   B UE.  Pt able to manipulate footrest to rotate away and back and can take off, but cannot replace  Type:    [x]  Manual        []  Electric  W/c mobility on ramps with cues for keeping COG forward while ascending, pt had one instance of slight tip back but recovered  Curb   Height:  2\"  Assistance:  CG  Device:  2ww    Uneven Surfaces:       Assistance:    CG  Device:    2ww  Surfaces Completed:   []  Green mat with bean bags beneath      []  Throw rugs       []  Ramp       []  Outdoor pavements        []  Grass             []  Loose gravel        []  Other:         Additional Therapeutic activities/exercises completed this date:     []   Nu-step:  Time:        Level:         #Steps:       []   Rebounder:    []  Seated     []  Standing        []   Balance training         []   Postural training    [x]   Supine ther ex (reps/sets): amputee LE ex program in sitting and supine with min cues, 15 x1 each    []   Seated ther ex (reps/sets):     []   Standing ther ex (reps/sets):     []   Picking up object from floor (standing):                   []   Reacher used   []   Other:   []   Other:    Comments:      Patient/Caregiver Education and Training:   []   Role of PT  [x]   Education about Dx  []   Use of call light for assist   [x] Wheelchair mobility/management  [x]   HEP provided and explained   [x]   Treatment plan reviewed  []   Home safety  []   Body mechanics  [x]   Positioning: able to don/doff ampushield without cues  [x]   Bed Mobility/Transfer technique  [x]   Gait technique/sequencing  []   Proper use of assistive device/adaptive equipment  [x]   Stair training/Advanced mobility safety and technique  []   Reinforced patient's precautions/mobility while maintaining precautions  []   Postural awareness  []   Family/caregiver training  []   Progress was updated and reviewed in Rehabtracker with patient and/or family this date. []   Other:      Treatment Plan for Next Session: BKA ex program, all mobility skills      Assessment:    Assessment: This pt demonstrated a positive   response to today's treatment as evidenced by improved safety in most tasks. Pt did have concerning problem solving with vadim shoe today and pt has social issues making him slightly hesitant to discharge Saturday. CM aware. .  The patient is making  progress toward established goals as evidenced by QI scores. Treatment/Activity Tolerance:   [] Tolerated treatment with no adverse effects    [] Patient limited by fatigue  [] Patient limited by pain   [] Patient limited by medical complications:    [] Adverse reaction to Tx:   [] Significant change in status    Barrier/s to progress/learning:   []   None  [x]   Cognition?   []   Hearing deficit  []   Pre-morbid mental/psychological status   []   Motivation  []   Communication  []   Anxiety  []   Vision deficit  []   Attention  []   Other:      Safety:       [x]  bed alarm set    [x]  chair alarm set    []  Pt refused alarms                []  Telesitter activated      [x]  Gait belt used during tx session      []other:         Number of Minutes/Billable Intervention  Gait Training 20   Therapeutic Exercise 25   Neuro Re-Ed 10   Therapeutic Activity    Wheelchair Propulsion 30   Group    Other:    TOTAL 90 Social History  Social/Functional History  Lives With: Alone  Type of Home: House  Home Layout: Two level, Able to Live on Main level with bedroom/bathroom  Home Access: Ramped entrance  Bathroom Shower/Tub: Tub/Shower unit (TTB)  Bathroom Toilet: Standard  Bathroom Equipment: Tub transfer bench  Bathroom Accessibility: Accessible  Home Equipment: Amarjit Blayne, rolling, [place] Push  Has the patient had two or more falls in the past year or any fall with injury in the past year?: Yes (Pt with 3-4 falls in the last year with no significant injuries.)  ADL Assistance: Independent  Homemaking Assistance: Independent  Homemaking Responsibilities: Yes  Meal Prep Responsibility: Primary  Laundry Responsibility: Primary  Cleaning Responsibility: Primary  Bill Paying/Finance Responsibility: Primary  Shopping Responsibility: Primary  Dependent Care Responsibility: No  Health Care Management: Primary  Ambulation Assistance: Independent (Pt has been using 2WW for the last couple months and manual w/c for a couple days prior to admission.)  Transfer Assistance: Independent  Active : Yes  Mode of Transportation: Car  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 Pt with 3-4 falls in the last year.     Objective                                                                                    Goals:  (Update in navigator)  Short Term Goals  Time Frame for Short term goals: 10-12 days STG=LTG  Short term goal 1: Pt will perform sit to stand, w/c<>bed and car transfers with mod I  Short term goal 2: Pt will ambulate 48' with 2ww onlevel surfaces with mod I, 10' on unlevel surface with supervision  Short term goal 3: Pt will ascend/descend 2\" step with 2ww with supervision  Short term goal 4: Pt will  light object with 2ww and reacher with CGA  Short term goal 5: Pt will perform w/c mobility in household setting with mod I:   :        Plan of Care                                                                              Times per week: 5 days per week for a minimum of 60 minutes/day plus group as appropriate for 60 minutes.   Treatment to include Current Treatment Recommendations: Strengthening, ROM, Balance training, Functional mobility training, Transfer training, ADL/Self-care training, IADL training, Cognitive/Perceptual training, Endurance training, Wheelchair mobility training, Gait training, Neuromuscular re-education, Safety education & training, Therapeutic activities, Patient/Caregiver education & training, Equipment evaluation, education, & procurement, Positioning, Home exercise program, Pain management, Stair training    Electronically signed by   Dany Garcia PT,  9/14/2022, 12:54 PM No

## 2022-09-14 NOTE — PROGRESS NOTES
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 No significant nausea. No belly cramping. Prior (baseline) level of function: Independent. Current level of function:         Current  IRF-DAYNA and Goals:   Occupational Therapy:    Short Term Goals  Time Frame for Short term goals: STGs=LTGs :   Long Term Goals  Time Frame for Long term goals : 10 days or until d/c. Long Term Goal 1: Pt will complete oral care and grooming tasks c Mod I.  Long Term Goal 2: Pt will complete total body bathing c AE PRN and Mod I.  Long Term Goal 3: Pt will complete UB dressing c IND. Long Term Goal 4: Pt will complete LB dressing c AE PRN and Mod I.  Long Term Goal 5: Pt will doff/don footwear to L foot c AE PRN and Mod I. Additional Goals?: Yes  Long Term Goal 6: Pt will complete toileting c Mod I.  Long Term Goal 7: Pt will perform functional transfers (bed, chair, toilet, shower) c DME PRN and Mod I.  Long Term Goal 8: Pt will perform therex/therax to facilitate an increase in strength/endurance (c emphasis on dynamic standing balance/tolerance > 8 mins) c Mod I.  Long Term Goal 9: Pt will complete home management tasks c Mod I. :                                       Eating: Eating  Assistance Needed: Independent  Comment: Ind per patient report  CARE Score: 6  Discharge Goal: Independent       Oral Hygiene: Oral Hygiene  Assistance Needed: Independent  Comment:  Mod I Rinses with  mouthwash seated sinkside  CARE Score: 6  Discharge Goal: Independent    UB/LB Bathing: Shower/Bathe Self  Assistance Needed: Supervision or touching assistance  Comment: Sup  seated entirety of shower weigt shifts to wash buttocks/periarea  CARE Score: 4  Discharge Goal: Independent    UB Dressing: Upper Body Dressing  Assistance Needed: Independent  Comment: X  CARE Score: 6  Discharge Goal: Independent         LB Dressing: Lower Body Dressing  Assistance Needed: Supervision or touching assistance  Comment: Sup/Mod I in stance at sink  counter using hip for balance  CARE Score: 4  Discharge Goal: Independent    Donning and [place] Footwear: Putting On/Taking Off Footwear  Assistance Needed: Independent  Comment: X  CARE Score: 6  Discharge Goal: Independent      Toiletin [address] needed: Supervision or touching assistance  Comment: Sup to manage clothes in stance completes hygiene seated  CARE Score: 4  Discharge Goal: Independent      Toilet Transfers:   Toilet Transfer  Assistance needed: Supervision or touching assistance  Comment: Sup  CARE Score: 4  Discharge Goal: Independent    Physical Therapy:   Short Term Goals  Time Frame for Short term goals: 10-12 days STG=LTG  Short term goal 1: Pt will perform sit to stand, w/c<>bed and car transfers with mod I  Short term goal 2: Pt will ambulate 48' with 2ww onlevel surfaces with mod I, 10' on unlevel surface with supervision  Short term goal 3: Pt will ascend/descend 2\" step with 2ww with supervision  Short term goal 4: Pt will  light object with 2ww and reacher with CGA  Short term goal 5: Pt will perform w/c mobility in household setting with mod I            Bed Mobility:   Sit to Lying  Assistance Needed: Independent  CARE Score: 6  Discharge Goal: Independent  Roll Left and Right  Assistance Needed: Independent  Comment: no rails needed  CARE Score: 6  Discharge Goal: Independent  Lying to Sitting on Side of Bed  Assistance Needed: Independent  CARE Score: 6  Discharge Goal: Independent    Transfers:    Sit to Stand  Assistance Needed: Supervision or touching assistance  Comment: sup for up to recliner  CARE Score: 4  Chair/Bed-to-Chair Transfer  Assistance Needed: Supervision or touching assistance  Comment: sup w/ FWW ; inc time and effort  CARE Score: 4  Discharge Goal: Independent     Car Transfer  Assistance Needed: Independent  Comment: mod ind  CARE Score: 6  Discharge Goal: Independent    Ambulation:    Walking Ability  Does the Patient Walk?: Yes     Walk 10 Feet  Assistance Needed: Supervision or touching assistance  Comment: SBA w/ FWW ; hop to  CARE Score: 4  Discharge Goal: Independent     Walk 50 Feet with Two Turns  Assistance Needed: Supervision or touching assistance  Comment: sup w/ hop to pattern  CARE Score: 4  Discharge Goal: Independent     Walk 150 Feet  Comment: distance contradictory 2/2 L heel wound w/ impaired healing ; max of 110 ft w/ FWW  Reason if not Attempted: Not attempted due to medical condition or safety concerns  CARE Score: 88  Discharge Goal: Not Attempted     Walking 10 Feet on Uneven Surfaces  Assistance Needed: Supervision or touching assistance  Comment: CGA w/ FWW ; inc time and effort, inferior gaze  CARE Score: 4  Discharge Goal: Supervision or touching assistance     1 Step (Curb)  Assistance Needed: Supervision or touching assistance  Comment: CGA w/ FWW ; mod cues  Reason if not Attempted: Not attempted due to medical condition or safety concerns  CARE Score: 4  Discharge Goal: Supervision or touching assistance     4 Steps  Comment: curb step only ; unsafe for performance and contraindicated 2/2 wound healing  Reason if not Attempted: Not attempted due to medical condition or safety concerns  CARE Score: 88  Discharge Goal: Not Attempted     12 Steps  Comment: curb step only ; unsafe for performance and contraindicated 2/2 wound healing  Reason if not Attempted: Not attempted due to medical condition or safety concerns  CARE Score: 88  Discharge Goal: Not Attempted       Wheelchair:  w/c Ability: Wheelchair Ability  Uses a Wheelchair and/or Scooter?: Yes  Wheel 50 Feet with Two Turns  Assistance Needed: Independent  Comment: ROBERT UE propulsion  CARE Score: 6  Discharge Goal: Independent  Wheel 150 Feet  Assistance Needed: Independent  Comment: ROBERT UE propulsion  CARE Score: 6  Discharge Goal: Independent          Balance:        Object: Picking Up Object  Assistance Needed: Independent  Comment: set-up for object retrieval ; mod ind  CARE Score: 6  Discharge Goal: Supervision or touching assistance    I      Exam:    Blood pressure (!) 151/89, pulse 75, temperature 98.2 °F (36.8 °C), temperature source Oral, resp. rate 17, height 6' (1.829 m), weight 171 lb 8.3 oz (77.8 kg), SpO2 97 %. General: Up in wheelchair. Gazing right and left. Talkative. HEENT: Symmetric facial expression. Clear voice. Neck supple. Pulmonary: Symmetric air exchange without wheezing or coughing. Cardiac: Regular rate and rhythm. Abdomen: Patient's abdomen is soft and nondistended. Bowel sounds were present throughout. There was no rebound, guarding or masses noted. Upper extremities: Using his upper limbs to attempt to turn his wheelchair. Functional  strength. Diminished sensation and no reflexes. Lower extremities: His right BKA stump is tender and swollen. Incision is clean and dry and stable. The knee flexes to about 80 degrees already. Sitting balance was good. Standing balance was poor.     Lab Results   Component Value Date    WBC 11.6 (H) 09/08/2022    HGB 11.8 (L) 09/08/2022    HCT 36.9 (L) 09/08/2022    MCV 91.1 09/08/2022     (H) 09/08/2022     Lab Results   Component Value Date    INR 0.89 05/20/2022    INR 1.14 04/19/2022    INR 1.27 06/12/2021    PROTIME 11.5 (L) 05/20/2022    PROTIME 14.7 (H) 04/19/2022    PROTIME 15.4 (H) 06/12/2021     Lab Results   Component Value Date    CREATININE 1.7 (H) 09/12/2022    BUN 14 09/12/2022     09/12/2022    K 3.7 09/12/2022    CL 99 09/12/2022    CO2 30 09/12/2022     Lab Results   Component Value Date    ALT 15 09/04/2022    AST 24 09/04/2022    ALKPHOS 73 09/04/2022    BILITOT 0.3 09/04/2022       Expected length of stay  prior to a supervised level of function for discharge home with a wheelchair and [facility] OT/PT is 9/17/2022. Recommendations:    Necrotizing fasciitis of the right lower limb with amputation: Pain occasionally distracts him from fully engaging in the activities of his daily occupational and physical therapy. No fever off antibiotics. Surgical follow-up in the office in about 2 weeks. Ongoing aggressive pulmonary management, wound care and DVT prophylaxis. Practicing techniques for self-care activities and mobility with no weightbearing through the right lower limb. Caregiver training has been offered. Local wound care, nutritional support and wound care of the left foot. Outpatient follow-up with his surgeon and PCP. Verbal cues and CGA-minimum physical assistance for transfers. DVT prophylaxis: Lovenox 40 mg subcu daily. I must monitor his hemoglobin and platelet count while on this medication. Weightbearing activities are limited but are tried through the left foot daily. GI prophylaxis is offered. No new bruising or swelling. Uncontrolled pain: His needs are being met with oral oxycodone as needed. Scheduled acetaminophen. Does not feel like we need to add gabapentin at this point. Bowel intervention has been successful. Limb elevation and progressive mobilization. Necrotizing fasciitis: IV antibiotics were tolerated well and have been discontinued. Monitoring him for signs of further soft tissue compromise. Monitoring his fever curve and for any leukocytosis. Uncontrolled diabetes type 2 with peripheral neuropathy: Patient requires a diet modified for carbohydrates.   He is on scheduled Lantus and Humalog sliding scale. Blood sugars are checked at mealtime and bedtime. Hypertension: Cozaar and Toprol for blood pressure regulation. Vital signs are checked at rest and with activity. Target systolic blood pressures 787-483. His blood pressure is above the target range today. Pneumatosis coli: He has completed a full course of antibiotics. Outpatient follow-up with gastroenterology with reimaging in the near future. Monitoring him for GI symptoms. Left foot diabetic ulcer: Wound care team is directing the management of the left foot's local wound care. Monitoring drainage and tenderness. He is tolerating the weightbearing through the left heel and plantar surface for now. Depression with anxiety: Sleep regulation, pain management and treatment in a calm and consistent environment. Caregiver training and involved in the planning for discharge. Counseling and Coordination of Care: In care conference today I met with the patient's OT, PT, RN and . We discussed the patient's problems, progress and prognosis. Disposition issues were clarified and plans were established for ongoing rehabilitation efforts beyond the ARU stay. I reviewed this information with the patient during a second distinct visit with the patient. More than half of the total time of 33 minutes spent with the patient involved counseling and coordination of care. This is a late entry note for service provided 9/13/2022.

## 2022-09-14 NOTE — CARE COORDINATION
[facility] [phone] spoke with Taya Ireland and Khang Santa. They transferred me to the [facility] line to make a referral. Left a VM, awaiting a call back. Attempted to make referral for Meals on Wheels, was disconnected x3. Will attempt again later. Left message for Joel Jeffyles awaiting call back. Spoke with patient's brother. He voiced concerns with discharge. He would like his brother to go to an [address] [place] [address] in [facility]. Discuss current discharge plan and resources that have been provided. The patients goal is to return home prior to moving to [facility]. Spoke with Deloris Rehman with referral for [facility] and faxed clinical.     Arturo Wagoner on Aging spoke with [employer]. She stated that patient has an open referral for the [facility]. It was started August 2022. The agency following the patient is [facility]. [phone]. Called Sarah and Spoke with Glenn Henderson to follow up on status of referral. Patient has been denied [facility] due to being uncooperative. Agency made multiple attempts to contact patient over the course of August and September. First contact attempt was 8/10/2022 and case was denied and closed was on 09/06/2022. Per Glenn Henderson patient is welcome to reapply with [email or web address] if he chooses. Patient notified.

## 2022-09-15 ENCOUNTER — CARE COORDINATION (OUTPATIENT)
Dept: CARE COORDINATION | Age: 42
End: 2022-09-15

## 2022-09-15 ENCOUNTER — TELEPHONE (OUTPATIENT)
Dept: FAMILY MEDICINE CLINIC | Age: 42
End: 2022-09-15

## 2022-09-15 LAB
GLUCOSE BLD-MCNC: 151 MG/DL (ref 70–99)
GLUCOSE BLD-MCNC: 162 MG/DL (ref 70–99)
GLUCOSE BLD-MCNC: 224 MG/DL (ref 70–99)
GLUCOSE BLD-MCNC: 97 MG/DL (ref 70–99)

## 2022-09-15 PROCEDURE — 97530 THERAPEUTIC ACTIVITIES: CPT

## 2022-09-15 PROCEDURE — 82962 GLUCOSE BLOOD TEST: CPT

## 2022-09-15 PROCEDURE — 1280000000 HC REHAB R&B

## 2022-09-15 PROCEDURE — 99232 SBSQ HOSP IP/OBS MODERATE 35: CPT | Performed by: PHYSICAL MEDICINE & REHABILITATION

## 2022-09-15 PROCEDURE — 6370000000 HC RX 637 (ALT 250 FOR IP): Performed by: STUDENT IN AN ORGANIZED HEALTH CARE EDUCATION/TRAINING PROGRAM

## 2022-09-15 PROCEDURE — 6360000002 HC RX W HCPCS: Performed by: STUDENT IN AN ORGANIZED HEALTH CARE EDUCATION/TRAINING PROGRAM

## 2022-09-15 PROCEDURE — 97116 GAIT TRAINING THERAPY: CPT

## 2022-09-15 PROCEDURE — 97542 WHEELCHAIR MNGMENT TRAINING: CPT

## 2022-09-15 PROCEDURE — 97110 THERAPEUTIC EXERCISES: CPT

## 2022-09-15 PROCEDURE — 94761 N-INVAS EAR/PLS OXIMETRY MLT: CPT

## 2022-09-15 RX ADMIN — SUCRALFATE 1 G: 1 TABLET ORAL at 20:54

## 2022-09-15 RX ADMIN — OXYCODONE HYDROCHLORIDE 10 MG: 10 TABLET ORAL at 06:02

## 2022-09-15 RX ADMIN — DOCUSATE SODIUM 100 MG: 100 CAPSULE, LIQUID FILLED ORAL at 08:14

## 2022-09-15 RX ADMIN — OXYCODONE HYDROCHLORIDE 10 MG: 10 TABLET ORAL at 12:50

## 2022-09-15 RX ADMIN — ATORVASTATIN CALCIUM 40 MG: 40 TABLET, FILM COATED ORAL at 20:54

## 2022-09-15 RX ADMIN — INSULIN GLARGINE 30 UNITS: 100 INJECTION, SOLUTION SUBCUTANEOUS at 20:53

## 2022-09-15 RX ADMIN — FLUOXETINE 80 MG: 20 CAPSULE ORAL at 08:14

## 2022-09-15 RX ADMIN — PANTOPRAZOLE SODIUM 20 MG: 20 TABLET, DELAYED RELEASE ORAL at 06:01

## 2022-09-15 RX ADMIN — LOSARTAN POTASSIUM 25 MG: 25 TABLET, FILM COATED ORAL at 08:14

## 2022-09-15 RX ADMIN — ENOXAPARIN SODIUM 40 MG: 40 INJECTION SUBCUTANEOUS at 08:14

## 2022-09-15 RX ADMIN — METOPROLOL SUCCINATE 25 MG: 25 TABLET, EXTENDED RELEASE ORAL at 08:14

## 2022-09-15 RX ADMIN — OXYCODONE HYDROCHLORIDE 10 MG: 10 TABLET ORAL at 18:16

## 2022-09-15 RX ADMIN — SUCRALFATE 1 G: 1 TABLET ORAL at 08:14

## 2022-09-15 ASSESSMENT — PAIN DESCRIPTION - ORIENTATION
ORIENTATION: LEFT;RIGHT
ORIENTATION: RIGHT

## 2022-09-15 ASSESSMENT — PAIN SCALES - GENERAL
PAINLEVEL_OUTOF10: 7
PAINLEVEL_OUTOF10: 0
PAINLEVEL_OUTOF10: 4
PAINLEVEL_OUTOF10: 3
PAINLEVEL_OUTOF10: 10

## 2022-09-15 ASSESSMENT — PAIN DESCRIPTION - ONSET: ONSET: ON-GOING

## 2022-09-15 ASSESSMENT — PAIN DESCRIPTION - LOCATION
LOCATION: LEG;HEAD
LOCATION: LEG

## 2022-09-15 ASSESSMENT — PAIN DESCRIPTION - DESCRIPTORS
DESCRIPTORS: ACHING
DESCRIPTORS: ACHING;THROBBING
DESCRIPTORS: ACHING
DESCRIPTORS: ACHING;THROBBING
DESCRIPTORS: ACHING

## 2022-09-15 ASSESSMENT — PAIN - FUNCTIONAL ASSESSMENT: PAIN_FUNCTIONAL_ASSESSMENT: PREVENTS OR INTERFERES SOME ACTIVE ACTIVITIES AND ADLS

## 2022-09-15 ASSESSMENT — PAIN DESCRIPTION - PAIN TYPE: TYPE: SURGICAL PAIN

## 2022-09-15 ASSESSMENT — PAIN SCALES - WONG BAKER: WONGBAKER_NUMERICALRESPONSE: 2

## 2022-09-15 ASSESSMENT — PAIN DESCRIPTION - FREQUENCY: FREQUENCY: INTERMITTENT

## 2022-09-15 NOTE — PROGRESS NOTES
Comprehensive Nutrition Assessment    Type and Reason for Visit:  Reassess    Nutrition Recommendations/Plan:   Continue carb controlled diet   Continue diabetic oral nutrition supplement during stay  Will continue to follow up during stay      Malnutrition Assessment:  Malnutrition Status:  No malnutrition (09/09/22 6318)    Context:  Acute Illness       Nutrition Assessment:    Improving intake during stay, recent meals %. Remains on carb controlled diet with diabetic oral nutrition supplement provided with meals per patient request. Noted case management working with patient for home meals, not eligible for meals on wheels through senior services. Will continue to follow at moderate nutrition risk at this time. Nutrition Related Findings:    up in chair for lunch, glucose POCT under 200 mg/dL Wound Type: Surgical Incision       Current Nutrition Intake & Therapies:    Average Meal Intake: %  Average Supplements Intake: Unable to assess  ADULT DIET; Regular; 4 carb choices (60 gm/meal)  ADULT ORAL NUTRITION SUPPLEMENT; Breakfast, Lunch, Dinner; Diabetic Oral Supplement    Anthropometric Measures:  Height: 6' (182.9 cm)  Ideal Body Weight (IBW): 178 lbs (81 kg)    Admission Body Weight: 184 lb 11.9 oz (83.8 kg)  Current Body Weight: 173 lb 1 oz (78.5 kg), 96.2 % IBW. Weight Source: Bed Scale  Current BMI (kg/m2): 23.5  Usual Body Weight: 180 lb (81.6 kg) (per patient)  % Weight Change (Calculated): -4.8  Weight Adjustment For: Amputation  Total Adjusted Percentage (Calculated): 5.9  Adjusted Ideal Body Weight (lbs) (Calculated): 167.5 lbs  Adjusted Ideal Body Weight (kg) (Calculated): 76.14 kg  Adjusted % Ideal Body Weight (Calculated): 102.3  Adjusted BMI (kg/m2) (Calculated): 24.6  BMI Categories: Normal Weight (BMI 18.5-24. 9)    Estimated Daily Nutrient Needs:  Energy Requirements Based On: Kcal/kg  Weight Used for Energy Requirements: Current  Energy (kcal/day): 5111-3025 (25-30 negro/kg)  Weight Used for Protein Requirements: Current  Protein (g/day):  (1.2-1.4 g/kg)  Method Used for Fluid Requirements: 1 ml/kcal  Fluid (ml/day): 2300    Nutrition Diagnosis:   Predicted inadequate energy intake related to increase demand for energy/nutrients as evidenced by poor intake prior to admission, wounds    Nutrition Interventions:   Food and/or Nutrient Delivery: Continue Current Diet, Continue Oral Nutrition Supplement  Nutrition Education/Counseling: Education initiated  Coordination of Nutrition Care: Continue to monitor while inpatient  Plan of Care discussed with: patient    Goals:  Previous Goal Met: Progressing toward Goal(s)  Goals: PO intake 75% or greater, by next RD assessment       Nutrition Monitoring and Evaluation:   Behavioral-Environmental Outcomes: None Identified  Food/Nutrient Intake Outcomes: Food and Nutrient Intake, Supplement Intake  Physical Signs/Symptoms Outcomes: Biochemical Data, Meal Time Behavior, Skin, Weight    Discharge Planning:    Continue current diet     Lucien Steven RD, LD  Contact: [phone]

## 2022-09-15 NOTE — PROGRESS NOTES
Occupational Therapy  Physical Rehabilitation: OCCUPATIONAL THERAPY     [x] daily progress note       [] discharge       Patient Name:  Fatuma Mcmahan   :  1980 MRN: [id number]  Room:  [address] [place], [place] [address]A Date of Admission: 2022  Rehabilitation Diagnosis:   Necrotizing fasciitis [M72.6]  Necrotizing fasciitis ([address] [M72.6]       Date 9/15/2022       Day of ARU Week:  1   Time IN/OUT 5823-2117   Individual Tx Minutes 60   Group Tx Minutes    Co-Treat Minutes    Concurrent Tx Minutes    TOTAL Tx Time Mins 60   Variance Time    Variance Time []   Refusal due to:     []   Medical hold/reason:    []   Illness   []   Off Unit for test/procedure  []   Extra time needed to complete task  []   Therapeutic need  []   Other (specify):   Restrictions Restrictions/Precautions: General Precautions, Fall Risk, Weight Bearing         Communication with other providers: [x]   OK to see per nursing:     []   Spoke with team member regarding:      Subjective observations and cognitive status: Pt sitting in recliner on approach; pleasant and agreeable to therapy session. Pain level/location:    /10       Location: declined    Discharge recommendations  Anticipated discharge date:    Destination: []home alone   [x]home alone w assist prn   [] home w/ family    [] Continuous supervision       []SNF    [] Assisted living     [] Other:   Continued therapy: []HHC OT  []OUTPATIENT  OT   [x] No Further OT  Equipment needs: None, has BSC and TTB     Toileting:   Denied need        Toilet Transfers:   NA   Device Used:    []   Standard Toilet         []   Grab Bars           []  Bedside Commode       []   Elevated Toilet          []   Other:        Bed Mobility:           [x]   Pt received out of bed       Transfers:    Sit--> Stand:  mod I   Stand --> Sit:   Mod I   Stand-Pivot:   Supervision, cues to lock brakes on WC   Other:    Assistive device required for transfer:   none       Functional Mobility:   To increase BUE strength and endurance for functional transfers and ADLs, Pt self propelled  ft     Assistance:  mod I   Device:   []   Lona Price     []   Standard Walker [x]   Wheelchair        []   [place]       []   4-Wheeled Walker         []   Cardiac Walker       []   Other:        Additional Therapeutic activities/exercises completed this date:     []   ADL Training   [x]   Balance/Postural training: Pt stood x 2.5 min + 2.5 mins c RW with CGA while completing dynamic stand task while reaching outside base of support to facilitate increased balance for ADL tasks and transfers. []   Bed/Transfer Training   []   Endurance Training   []   Neuromuscular Re-ed   []   Nu-step:  Time:        Level:         #Steps:       [x]   Rebounder:    [x]  Seated     []  Standing      To increase BUE/core strength, pt completed 2 sets of 20 reps c 4# ball.        []   Supine Ther Ex (reps/sets):     [x]   Seated Ther Ex (reps/sets): To increase UB strength for functional transfers and ADLs, Pt completed 4 sets of 10 reps c 48# on rickshaw.   -To increase BUE endurance for ADLs and transfers, pt completed 2 set x10 reps of the following therex, alternating UEs, c a 5# weight:   Shoulder presses  Chest presses  Shoulder abduction/adduction  Shoulder horizontal abduction/adduction  Concentric arm circles (clockwise and counterclockwise)  Bicep curls  Tricep curls     []   Standing Ther Ex (reps/sets):     []   Other:      Comments: All intervention performed to increase pt's strength, endurance, ax tolerance, and balance in prep for increased I c ADL/IADLs and functional transfers/mobility.      Patient/Caregiver Education and Training:   []   Adaptive Equipment Use  []   Bed Mobility/Transfer Technique/Safety  []   Energy Conservation Tips  []   Family training  []   Postural Awareness  []   Safety During Functional Activities  []   Reinforced Patient's Precautions   []   Progress was updated and reviewed in Rehabtracker with patient and/or family this         date. Treatment Plan for Next Session: Continue OT POC           Treatment/Activity Tolerance:   [x] Tolerated treatment with no adverse effects    [] Patient limited by fatigue  [] Patient limited by pain   [] Patient limited by medical complications:    [] Adverse reaction to Tx:   [] Significant change in status    Safety:       []  bed alarm set    [x]  chair alarm set    []  Pt refused alarms                []  Telesitter activated      [x]  Gait belt used during tx session      []other:       Number of Minutes/Billable Intervention  Therapeutic Exercise 40   ADL Self-care    Neuro Re-Ed    Therapeutic Activity 20   Group    Other:    TOTAL 60       Social History  Social/Functional History  Lives With: Alone  Type of Home: House  Home Layout: Two level, Able to Live on Main level with bedroom/bathroom  Home Access: Ramped entrance  Bathroom Shower/Tub: Tub/Shower unit (TTB)  Bathroom Toilet: Standard  Bathroom Equipment: Tub transfer bench  Bathroom Accessibility: Accessible  Home Equipment: Walker, rolling, Brendolyn Lang  Has the patient had two or more falls in the past year or any fall with injury in the past year?: Yes (Pt with 3-4 falls in the last year with no significant injuries.)  ADL Assistance: Independent  Homemaking Assistance: Independent  Homemaking Responsibilities: Yes  Meal Prep Responsibility: Primary  Laundry Responsibility: Primary  Cleaning Responsibility: Primary  Bill Paying/Finance Responsibility: Primary  Shopping Responsibility: Primary  Dependent Care Responsibility: No  Health Care Management: Primary  Ambulation Assistance: Independent (Pt has been using 2WW for the last couple months and manual w/c for a couple days prior to admission.)  Transfer Assistance: Independent  Active : Yes  Mode of Transportation: Car  Occupation: On [address]:  [employer]  Additional Comments: Pt has regular flat bed and couch that he sleeps on. Pt with 3-4 falls in the last year. Objective                                                                                    Goals:  (Update in navigator)  Short Term Goals  Time Frame for Short term goals: STGs=LTGs:  Long Term Goals  Time Frame for Long term goals : 10 days or until d/c. Long Term Goal 1: Pt will complete oral care and grooming tasks c Mod I.  Long Term Goal 2: Pt will complete total body bathing c AE PRN and Mod I.  Long Term Goal 3: Pt will complete UB dressing c IND. Long Term Goal 4: Pt will complete LB dressing c AE PRN and Mod I.  Long Term Goal 5: Pt will doff/don footwear to L foot c AE PRN and Mod I. Additional Goals?: Yes  Long Term Goal 6: Pt will complete toileting c Mod I.  Long Term Goal 7: Pt will perform functional transfers (bed, chair, toilet, shower) c DME PRN and Mod I.  Long Term Goal 8: Pt will perform therex/therax to facilitate an increase in strength/endurance (c emphasis on dynamic standing balance/tolerance > 8 mins) c Mod I.  Long Term Goal 9: Pt will complete home management tasks c Mod I.:        Plan of Care                                                                              Times per week: 5 days per week for a minimum of 60 minutes/day plus group as appropriate for 60 minutes.   Treatment to include Plan  Times per Day: Daily  Current Treatment Recommendations: Strengthening, Functional mobility training, Endurance training, Pain management, Safety education & training, Patient/Caregiver education & training, Equipment evaluation, education, & procurement, Positioning, Self-Care / ADL, Home management training, Balance training, Coordination training    Electronically signed by   HEMANT Paul,  9/15/2022, 8:27 AM

## 2022-09-15 NOTE — DISCHARGE INSTR - ACTIVITY
Pt is discharging to home with [facility]. [address], [place], [address] [facility]  [phone]  A representative from [address] [place] [address] will contact you at home to schedule your home care needs

## 2022-09-15 NOTE — PROGRESS NOTES
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 No nausea with his medicines. Pain persists in the leg. Prior (baseline) level of function: Independent. Current level of function:         Current  IRF-DAYNA and Goals:   Occupational Therapy:    Short Term Goals  Time Frame for Short term goals: STGs=LTGs :   Long Term Goals  Time Frame for Long term goals : 10 days or until d/c. Long Term Goal 1: Pt will complete oral care and grooming tasks c Mod I.  Long Term Goal 2: Pt will complete total body bathing c AE PRN and Mod I.  Long Term Goal 3: Pt will complete UB dressing c IND. Long Term Goal 4: Pt will complete LB dressing c AE PRN and Mod I.  Long Term Goal 5: Pt will doff/don footwear to L foot c AE PRN and Mod I. Additional Goals?: Yes  Long Term Goal 6: Pt will complete toileting c Mod I.  Long Term Goal 7: Pt will perform functional transfers (bed, chair, toilet, shower) c DME PRN and Mod I.  Long Term Goal 8: Pt will perform therex/therax to facilitate an increase in strength/endurance (c emphasis on dynamic standing balance/tolerance > 8 mins) c Mod I.  Long Term Goal 9: Pt will complete home management tasks c Mod I. :                                       Eating: Eating  Assistance Needed: Independent  Comment: Ind per patient report  CARE Score: 6  Discharge Goal: Independent       Oral Hygiene: Oral Hygiene  Assistance Needed: Independent  Comment:  Mod I Rinses with  mouthwash seated sinkside  CARE Score: 6  Discharge Goal: Independent    UB/LB Bathing: Shower/Bathe Self  Assistance Needed: Supervision or touching assistance  Comment: Sup  seated entirety of shower weigt shifts to wash buttocks/periarea  CARE Score: 4  Discharge Goal: Independent    UB Dressing: Upper Body Dressing  Assistance Needed: Independent  Comment: X  CARE Score: 6  Discharge Goal: Independent         LB Dressing: Lower Body Dressing  Assistance Needed: Supervision or touching assistance  Comment: Sup/Mod I in stance at sink  counter using hip for balance  CARE Score: 4  Discharge Goal: Independent    Donning and [place] Footwear: Putting On/Taking Off Footwear  Assistance Needed: Independent  Comment: X  CARE Score: 6  Discharge Goal: Independent      Toiletin [address] needed: Supervision or touching assistance  Comment: Sup to manage clothes in stance completes hygiene seated  CARE Score: 4  Discharge Goal: Independent      Toilet Transfers:   Toilet Transfer  Assistance needed: Supervision or touching assistance  Comment: Sup  CARE Score: 4  Discharge Goal: Independent    Physical Therapy:   Short Term Goals  Time Frame for Short term goals: 10-12 days STG=LTG  Short term goal 1: Pt will perform sit to stand, w/c<>bed and car transfers with mod I  Short term goal 2: Pt will ambulate 48' with 2ww onlevel surfaces with mod I, 10' on unlevel surface with supervision  Short term goal 3: Pt will ascend/descend 2\" step with 2ww with supervision  Short term goal 4: Pt will  light object with 2ww and reacher with CGA  Short term goal 5: Pt will perform w/c mobility in household setting with mod I            Bed Mobility:   Sit to Lying  Assistance Needed: Independent  CARE Score: 6  Discharge Goal: Independent  Roll Left and Right  Assistance Needed: Independent  Comment: no rails needed  CARE Score: 6  Discharge Goal: Independent  Lying to Sitting on Side of Bed  Assistance Needed: Independent  CARE Score: 6  Discharge Goal: Independent    Transfers:    Sit to Stand  Assistance Needed: Supervision or touching assistance  Comment: sup for up to recliner  CARE Score: 4  Chair/Bed-to-Chair Transfer  Assistance Needed: Supervision or touching assistance  Comment: sup w/ FWW ; inc time and effort  CARE Score: 4  Discharge Goal: Independent     Car Transfer  Assistance Needed: Independent  Comment: mod ind  CARE Score: 6  Discharge Goal: Independent    Ambulation:    Walking Ability  Does the Patient Walk?: Yes     Walk 10 Feet  Assistance Needed: Supervision or touching assistance  Comment: SBA w/ FWW ; hop to  CARE Score: 4  Discharge Goal: Independent     Walk 50 Feet with Two Turns  Assistance Needed: Supervision or touching assistance  Comment: sup w/ hop to pattern  CARE Score: 4  Discharge Goal: Independent     Walk 150 Feet  Comment: distance contradictory 2/2 L heel wound w/ impaired healing ; max of 110 ft w/ FWW  Reason if not Attempted: Not attempted due to medical condition or safety concerns  CARE Score: 88  Discharge Goal: Not Attempted     Walking 10 Feet on Uneven Surfaces  Assistance Needed: Supervision or touching assistance  Comment: CGA w/ FWW ; inc time and effort, inferior gaze  CARE Score: 4  Discharge Goal: Supervision or touching assistance     1 Step (Curb)  Assistance Needed: Supervision or touching assistance  Comment: CGA w/ FWW ; mod cues  Reason if not Attempted: Not attempted due to medical condition or safety concerns  CARE Score: 4  Discharge Goal: Supervision or touching assistance     4 Steps  Comment: curb step only ; unsafe for performance and contraindicated 2/2 wound healing  Reason if not Attempted: Not attempted due to medical condition or safety concerns  CARE Score: 88  Discharge Goal: Not Attempted     12 Steps  Comment: curb step only ; unsafe for performance and contraindicated 2/2 wound healing  Reason if not Attempted: Not attempted due to medical condition or safety concerns  CARE Score: 88  Discharge Goal: Not Attempted       Wheelchair:  w/c Ability: Wheelchair Ability  Uses a Wheelchair and/or Scooter?: Yes  Wheel 50 Feet with Two Turns  Assistance Needed: Independent  Comment: ROBERT UE propulsion  CARE Score: 6  Discharge Goal: Independent  Wheel 150 Feet  Assistance Needed: Independent  Comment: ROBERT UE propulsion  CARE Score: 6  Discharge Goal: Independent          Balance:        Object: Picking Up Object  Assistance Needed: Independent  Comment: set-up for object retrieval ; mod ind  CARE Score: 6  Discharge Goal: Supervision or touching assistance    I      Exam:    Blood pressure (!) 152/91, pulse 71, temperature 97.9 °F (36.6 °C), resp. rate 16, height 6' (1.829 m), weight 173 lb 15.1 oz (78.9 kg), SpO2 96 %. General: Semirecumbent in bed. Resting quietly. Alert and oriented. HEENT: Controls his oral airway. Neck supple. No adenopathy. Pulmonary: Symmetric air exchange without wheezing or coughing. Cardiac: RRR. Abdomen: Patient's abdomen is soft and nondistended. Bowel sounds were present throughout. There was no rebound, guarding or masses noted. Upper extremities: No new bruising or swelling. Fair dexterity. Lower extremities: His stump is tender and swollen but softer. The knee extends fully. Sitting balance was good. Standing balance was poor.     Lab Results   Component Value Date    WBC 11.6 (H) 09/08/2022    HGB 11.8 (L) 09/08/2022    HCT 36.9 (L) 09/08/2022    MCV 91.1 09/08/2022     (H) 09/08/2022     Lab Results   Component Value Date    INR 0.89 05/20/2022    INR 1.14 04/19/2022    INR 1.27 06/12/2021    PROTIME 11.5 (L) 05/20/2022    PROTIME 14.7 (H) 04/19/2022    PROTIME 15.4 (H) 06/12/2021     Lab Results   Component Value Date    CREATININE 1.7 (H) 09/12/2022    BUN 14 09/12/2022     09/12/2022    K 3.7 09/12/2022    CL 99 09/12/2022    CO2 30 09/12/2022     Lab Results   Component Value Date    ALT 15 09/04/2022    AST 24 09/04/2022    ALKPHOS 73 09/04/2022    BILITOT 0.3 09/04/2022       Expected length of stay  prior to a supervised level of function for discharge home with a wheelchair and [facility] OT/PT is 9/17/2022. Recommendations:    Necrotizing fasciitis of the right lower limb with amputation: He is demonstrating benefit from participating in the activities of his daily occupational and physical therapy. No fever while off antibiotics. Surgical follow-up in the office in about 2 weeks. He benefits from aggressive pulmonary management, wound care and DVT prophylaxis. Continuing to practice techniques for self-care activities and mobility with no weightbearing through the right lower limb. Caregiver training scheduled before discharge. Local wound care, nutritional support and wound care of the left foot. Outpatient follow-up with his surgeon and PCP. Verbal cues and CGA for transfers. DVT prophylaxis: Lovenox 40 mg subcu daily. I must monitor his hemoglobin and platelet count while on this medication. Weightbearing activities are limited but are tried through the left foot daily. GI prophylaxis is offered. No clinical signs of blood loss. Uncontrolled pain: His needs are being met with oral oxycodone as needed with the scheduled acetaminophen. Bowel intervention has been successful. Limb elevation and progressive mobilization. Necrotizing fasciitis: IV antibiotics were tolerated well and have been discontinued. Monitoring him for signs of further soft tissue compromise. Monitoring his fever curve and for any leukocytosis. Uncontrolled diabetes type 2 with peripheral neuropathy: Patient requires a diet modified for carbohydrates. He is on scheduled Lantus and Humalog sliding scale. Blood sugars are checked at mealtime and bedtime. Hypertension: Cozaar and Toprol for blood pressure regulation. Vital signs are checked at rest and with activity. Target systolic blood pressures 288-120.   His blood pressure is above the target range again today. Pneumatosis coli: He has completed a full course of antibiotics. Outpatient follow-up with gastroenterology with reimaging in the near future. Monitoring him for GI symptoms. Left foot diabetic ulcer: Wound care team is directing the management of the left foot's local wound care. Monitoring drainage and tenderness. He is tolerating the weightbearing through the left heel and plantar surface for now. Depression with anxiety: Sleep regulation, pain management and treatment in a calm and consistent environment. Caregiver training and involved in the planning for discharge.

## 2022-09-15 NOTE — CARE COORDINATION
Phone call to Danny Mullins, intake with [facility] to make referral for [facility]. Hadley stated he would give Pt a call to schedule assessment. KOLE plan of care:  SW will follow up with pt on 9/16 regarding AL waiver.

## 2022-09-15 NOTE — DISCHARGE INSTR - DIET

## 2022-09-15 NOTE — CARE COORDINATION
Called [facility] to make referral for [facility], spoke with Nicole Sharma. She transferred LSW to [facility] and VM was left, awaiting call back. [facility] returned call. The [employer] are unable to provide meals for patient due to age. Provided patient with food supplies and contact info from [employer]. [facility] [phone]. Spoke with Keri.  Patient has transport schedule for discharge 09/17/2022 @ 12pm. Trip # [id number]      to [place] via routed fax [phone]

## 2022-09-15 NOTE — PROGRESS NOTES
Physical Therapy  [x] daily progress note       [] discharge       Patient Name:  Dorothy Crowell   :  1980 MRN: [id number]  Room:  [address] [place] [address] Date of Admission: 2022  Rehabilitation Diagnosis:   Necrotizing fasciitis [M72.6]  Necrotizing fasciitis ([address] [M72.6]       Date 9/15/2022       Day of ARU Week:  1   Time IN//930, 1431/1531   Individual Tx Minutes 60,60   Group Tx Minutes    Co-Treat Minutes    Concurrent Tx Minutes    TOTAL Tx Time Mins 120   Variance Time    Variance Time []   Refusal due to:     []   Medical hold/reason:    []   Illness   []   Off Unit for test/procedure  []   Extra time needed to complete task  []   Therapeutic need  []   Other (specify):   Restrictions Restrictions/Precautions  Restrictions/Precautions: General Precautions, Fall Risk, Weight Bearing  Required Braces or Orthoses?: Yes (Pt with [facility] [place].)  Position Activity Restriction  Other position/activity restrictions: IV access to [place] communication [x]   Cleared for therapy per nursing     [x]   RN notified about issues during session: pt asked for pain meds at end of pm session   []   RN updated on pt performance  []   Spoke with   []   Spoke with OT  []   Spoke with MD  []   Other:    Subjective observations and cognitive status: AM: pt in bed, agreeable to therapy  PM: pt in bed, agreeable to therapy   Pain level/location:  am 0 pm 7 /10       Location: [facility]   Discharge recommendations  Anticipated discharge date:    Destination: []home alone   [x]home alone with assist PRN     [] home w/ family      [] Continuous supervision  []SNF    [] Assisted living     [] Other:  Continued therapy: [x][facility] PT  []OUTPATIENT  PT   [] No Further PT  []SNF PT  Caregiver training recommended: []Yes  [] No   Equipment needs: 2ww, w/c     Bed Mobility:           []   Pt received out of bed   Lying --> Sit:  mod I  Sit --> lying:  mod I  Bed features used: [x] Yes  [] No       Transfers: Sit--> Stand:  mod I  Stand --> Sit:   mod I  Chair-->Bed/Bed --> Chair:   supervision with cues for set up of w/c in am. In pm, reviewed safety at beginning of session and pt performed mod I for full session  Toilet Transfer (if applicable): mod I with rails  Other:  OT noted that pt did not remember to lock brakes in her session in bathroom    Gait:    Distance:  50'   Assistance:  mod I  Device:  2ww  Gait Quality:  good foot placement    Wheelchair Propulsion:  Distance:  48' with 2 turns, small space maneuvering in room   Assistance:  mod I  Extremities Used:   BUE  Worked on removal and replacement of elevating leg rest with good management. Also with propulsion on outdoor surfaces including 50' ramp in parking lot descending and ascending with SBA for assurance of safe negotiation. Type:    [x]  Manual        []  Electric      Additional Therapeutic activities/exercises completed this date:     [x]   Nu-step:  Time:  10 min      Level:  first 5 min on level 6, pt stated he felt like Ues were fatiguing, lowered to level 5 for second 5 min with one brief rest       #Steps:       []   Rebounder:    []  Seated     []  Standing        []   Balance training         []   Postural training    []   Supine ther ex (reps/sets):     [x]   Seated ther ex (reps/sets):  BLE with heavy tband for hip abduction and hip extension 10 x1 with c/o hip flexor tightening at end of each set of hip extension. LAQ and hip flexion LLE with 3# 10 x2. Pt stated he felt muscle fatigue at end of each set. [x]   Standing ther ex (reps/sets): at counter with BUE support performed hip extension and hip abduction 20 x1.     []   Picking up object from floor (standing):                   []   Reacher used   []   Other:   [x]   Other: In pm pt showed therapist ex program given to him by prosthetic company.  Felt this had format better for pt to follow with written positioning guidelines as well and trained in this in pm . Ex included quad sets, SLR, hip flexor stretch, single leg bridge, supine hip adductor isometric with abduction with blue t-band, sidelying hip abduction, prone leg extension, chair push-ups each tolerated from 6-20 reps dependent on muscle fatigue. Pt demonstrates good understanding, though asks often for reassurance. Comments:      Patient/Caregiver Education and Training:   []   Role of PT  [x]   Education about Dx  [x]   Use of call light for assist   [x]   Wheelchair mobility/management  [x]   HEP provided and explained   [x]   Treatment plan reviewed  []   Home safety  []   Body mechanics  []   Positioning  [x]   Bed Mobility/Transfer technique  [x]   Gait technique/sequencing  [x]   Proper use of assistive device/adaptive equipment  [x]   Stair training/Advanced mobility safety and technique  [x]   Reinforced patient's precautions/mobility while maintaining precautions  []   Postural awareness  []   Family/caregiver training  []   Progress was updated and reviewed in Rehabtracker with patient and/or family this date. []   Other:      Treatment Plan for Next Session: QI      Assessment:   Assessment: This pt demonstrated a positive  response to today's treatment as evidenced by improving consistency. The patient is making  progress toward established goals as evidenced by QI scores.      Treatment/Activity Tolerance:   [x] Tolerated treatment with no adverse effects    [] Patient limited by fatigue  [] Patient limited by pain   [] Patient limited by medical complications:    [] Adverse reaction to Tx:   [] Significant change in status    Barrier/s to progress/learning:   [x]   None  []   Cognition  []   Hearing deficit  []   Pre-morbid mental/psychological status   []   Motivation  []   Communication  []   Anxiety  []   Vision deficit  []   Attention  []   Other:      Safety:       [x]  bed alarm set    []  chair alarm set    []  Pt refused alarms                []  Telesitter activated      [x]  Gait belt used during tx session      []other:         Number of Minutes/Billable Intervention  Gait Training 15   Therapeutic Exercise 30   Neuro Re-Ed    Therapeutic Activity 45   Wheelchair Propulsion 30   Group    Other:    TOTAL 120         Social History  Social/Functional History  Lives With: Alone  Type of Home: House  Home Layout: Two level, Able to Live on Main level with bedroom/bathroom  Home Access: Ramped entrance  Bathroom Shower/Tub: Tub/Shower unit (TTB)  Bathroom Toilet: Standard  Bathroom Equipment: Tub transfer bench  Bathroom Accessibility: Accessible  Home Equipment: Gillian Steve, rolling, Trygleonor Martinia  Has the patient had two or more falls in the past year or any fall with injury in the past year?: Yes (Pt with 3-4 falls in the last year with no significant injuries.)  ADL Assistance: Independent  Homemaking Assistance: Independent  Homemaking Responsibilities: Yes  Meal Prep Responsibility: Primary  Laundry Responsibility: Primary  Cleaning Responsibility: Primary  Bill Paying/Finance Responsibility: Primary  Shopping Responsibility: Primary  Dependent Care Responsibility: No  Health Care Management: Primary  Ambulation Assistance: Independent (Pt has been using 2WW for the last couple months and manual w/c for a couple days prior to admission.)  Transfer Assistance: Independent  Active : Yes  Mode of Transportation: Car  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 Pt with 3-4 falls in the last year.     Objective                                                                                    Goals:  (Update in navigator)  Short Term Goals  Time Frame for Short term goals: 10-12 days STG=LTG  Short term goal 1: Pt will perform sit to stand, w/c<>bed and car transfers with mod I  Short term goal 2: Pt will ambulate 48' with 2ww onlevel surfaces with mod I, 10' on unlevel surface with supervision  Short term goal 3: Pt will ascend/descend 2\" step with 2ww with supervision  Short term goal 4: Pt will  light object with 2ww and reacher with CGA  Short term goal 5: Pt will perform w/c mobility in household setting with mod I:   :        Plan of Care                                                                              Times per week: 5 days per week for a minimum of 60 minutes/day plus group as appropriate for 60 minutes.   Treatment to include Current Treatment Recommendations: Strengthening, ROM, Balance training, Functional mobility training, Transfer training, ADL/Self-care training, IADL training, Cognitive/Perceptual training, Endurance training, Wheelchair mobility training, Gait training, Neuromuscular re-education, Safety education & training, Therapeutic activities, Patient/Caregiver education & training, Equipment evaluation, education, & procurement, Positioning, Home exercise program, Pain management, Stair training    Electronically signed by   David Christy PT,  9/15/2022, 8:17 AM

## 2022-09-16 ENCOUNTER — CARE COORDINATION (OUTPATIENT)
Dept: CARE COORDINATION | Age: 42
End: 2022-09-16

## 2022-09-16 LAB
GLUCOSE BLD-MCNC: 125 MG/DL (ref 70–99)
GLUCOSE BLD-MCNC: 126 MG/DL (ref 70–99)
GLUCOSE BLD-MCNC: 135 MG/DL (ref 70–99)
GLUCOSE BLD-MCNC: 165 MG/DL (ref 70–99)

## 2022-09-16 PROCEDURE — 97542 WHEELCHAIR MNGMENT TRAINING: CPT

## 2022-09-16 PROCEDURE — 97116 GAIT TRAINING THERAPY: CPT

## 2022-09-16 PROCEDURE — 82962 GLUCOSE BLOOD TEST: CPT

## 2022-09-16 PROCEDURE — 97110 THERAPEUTIC EXERCISES: CPT

## 2022-09-16 PROCEDURE — 97530 THERAPEUTIC ACTIVITIES: CPT

## 2022-09-16 PROCEDURE — 97535 SELF CARE MNGMENT TRAINING: CPT

## 2022-09-16 PROCEDURE — 1280000000 HC REHAB R&B

## 2022-09-16 PROCEDURE — 99232 SBSQ HOSP IP/OBS MODERATE 35: CPT | Performed by: PHYSICAL MEDICINE & REHABILITATION

## 2022-09-16 PROCEDURE — 6370000000 HC RX 637 (ALT 250 FOR IP): Performed by: PHYSICAL MEDICINE & REHABILITATION

## 2022-09-16 PROCEDURE — 6360000002 HC RX W HCPCS: Performed by: STUDENT IN AN ORGANIZED HEALTH CARE EDUCATION/TRAINING PROGRAM

## 2022-09-16 PROCEDURE — 6370000000 HC RX 637 (ALT 250 FOR IP): Performed by: STUDENT IN AN ORGANIZED HEALTH CARE EDUCATION/TRAINING PROGRAM

## 2022-09-16 PROCEDURE — 94761 N-INVAS EAR/PLS OXIMETRY MLT: CPT

## 2022-09-16 RX ORDER — OXYCODONE HYDROCHLORIDE 5 MG/1
5 TABLET ORAL EVERY 6 HOURS PRN
Qty: 28 TABLET | Refills: 0 | Status: ON HOLD | OUTPATIENT
Start: 2022-09-16 | End: 2022-09-25 | Stop reason: HOSPADM

## 2022-09-16 RX ORDER — OXYCODONE HYDROCHLORIDE 5 MG/1
5 TABLET ORAL EVERY 6 HOURS PRN
Status: DISCONTINUED | OUTPATIENT
Start: 2022-09-16 | End: 2022-09-17 | Stop reason: HOSPADM

## 2022-09-16 RX ORDER — PSEUDOEPHEDRINE HCL 30 MG
100 TABLET ORAL DAILY
COMMUNITY
Start: 2022-09-17

## 2022-09-16 RX ADMIN — METOPROLOL SUCCINATE 25 MG: 25 TABLET, EXTENDED RELEASE ORAL at 07:46

## 2022-09-16 RX ADMIN — OXYCODONE HYDROCHLORIDE 5 MG: 5 TABLET ORAL at 16:15

## 2022-09-16 RX ADMIN — PANTOPRAZOLE SODIUM 20 MG: 20 TABLET, DELAYED RELEASE ORAL at 05:25

## 2022-09-16 RX ADMIN — LOSARTAN POTASSIUM 25 MG: 25 TABLET, FILM COATED ORAL at 07:46

## 2022-09-16 RX ADMIN — INSULIN GLARGINE 30 UNITS: 100 INJECTION, SOLUTION SUBCUTANEOUS at 19:58

## 2022-09-16 RX ADMIN — DOCUSATE SODIUM 100 MG: 100 CAPSULE, LIQUID FILLED ORAL at 07:46

## 2022-09-16 RX ADMIN — OXYCODONE HYDROCHLORIDE 10 MG: 10 TABLET ORAL at 07:47

## 2022-09-16 RX ADMIN — FLUOXETINE 80 MG: 20 CAPSULE ORAL at 07:46

## 2022-09-16 RX ADMIN — ATORVASTATIN CALCIUM 40 MG: 40 TABLET, FILM COATED ORAL at 19:56

## 2022-09-16 RX ADMIN — SUCRALFATE 1 G: 1 TABLET ORAL at 19:56

## 2022-09-16 RX ADMIN — SUCRALFATE 1 G: 1 TABLET ORAL at 07:46

## 2022-09-16 RX ADMIN — ENOXAPARIN SODIUM 40 MG: 40 INJECTION SUBCUTANEOUS at 09:22

## 2022-09-16 ASSESSMENT — PAIN SCALES - GENERAL
PAINLEVEL_OUTOF10: 10
PAINLEVEL_OUTOF10: 10
PAINLEVEL_OUTOF10: 4
PAINLEVEL_OUTOF10: 10

## 2022-09-16 ASSESSMENT — PAIN DESCRIPTION - ORIENTATION
ORIENTATION: RIGHT
ORIENTATION: RIGHT

## 2022-09-16 ASSESSMENT — PAIN DESCRIPTION - LOCATION
LOCATION: KNEE
LOCATION: KNEE

## 2022-09-16 ASSESSMENT — PAIN DESCRIPTION - DESCRIPTORS
DESCRIPTORS: ACHING;BURNING
DESCRIPTORS: ACHING;BURNING

## 2022-09-16 NOTE — PROGRESS NOTES
Outpatient Pharmacy Progress Note for Meds-to-Beds    Total number of Prescriptions Filled: 1  The following medications were dispensed to the patient during the discharge process:  oxyCODONE    Additional Documentation:  Medication(s) were delivered to the patient's room prior to discharge      Thank you for letting us serve your patients.   [facility]    [address], [address] [facility]    Phone: [phone]    Fax: [phone]

## 2022-09-16 NOTE — PROGRESS NOTES
Physical Therapy  [x] daily progress note       [x] discharge       Patient Name:  Pancho Ren   :  1980 MRN: [id number]  Room:  [address] [place] [address] Date of Admission: 2022  Rehabilitation Diagnosis:   Necrotizing fasciitis [M72.6]  Necrotizing fasciitis ([address] [M72.6]       Date 2022       Day of ARU Week:  2   Time IN//930   Individual Tx Minutes 60   Group Tx Minutes    Co-Treat Minutes    Concurrent Tx Minutes    TOTAL Tx Time Mins 60   Variance Time    Variance Time []   Refusal due to:     []   Medical hold/reason:    []   Illness   []   Off Unit for test/procedure  []   Extra time needed to complete task  []   Therapeutic need  []   Other (specify):   Restrictions Restrictions/Precautions  Restrictions/Precautions: General Precautions, Fall Risk, Weight Bearing  Required Braces or Orthoses?: Yes (Pt with HARSHAL Willoughby.)  Position Activity Restriction  Other position/activity restrictions: IV access to [place] communication [x]   Cleared for therapy per nursing     []   RN notified about issues during session  []   RN updated on pt performance  []   Spoke with   []   Spoke with OT  []   Spoke with MD  []   Other:    Subjective observations and cognitive status: Pt in bed, agreeable to therapy. Voices that he is pleased with his progress.       Pain level/location:    2/10       Location: Pt states he had pain meds earlier due to higher pain, but it is subsiding in his residual limb   Discharge recommendations  Anticipated discharge date:    Destination: []home alone   [x]home alone with assist PRN     [] home w/ family      [] Continuous supervision  []SNF    [] Assisted living     [] Other:  Continued therapy: [x]HHC PT  []OUTPATIENT PT   [] No Further PT  []SNF PT  Caregiver training recommended: []Yes  [] No   Equipment needs: 2ww, w/c     PT IRF-DAYNA scores and goals for discharge assessment:   Roll Left and Right  Assistance Needed: Independent  Comment: no rails needed  CARE Score: 6  Discharge Goal: Independent    Sit to Lying  Assistance Needed: Independent  CARE Score: 6  Discharge Goal: Independent    Lying to Sitting on Side of Bed  Assistance Needed: Independent  CARE Score: 6  Discharge Goal: Independent    Sit to Stand  Assistance Needed: Independent  Comment: to 2ww  CARE Score: 6  Discharge Goal: Independent    Chair/Bed-to-Chair Transfer  Assistance Needed: Independent  Comment: pivot or 2ww  CARE Score: 6  Discharge Goal: Independent    Toilet Transfer  Assistance needed: Independent    Car Transfer  Assistance Needed: Independent  Comment: approach with w/c, independent with w/c management and transfer  CARE Score: 6  Discharge Goal: Independent    Walk 10 Feet?   Walk 10 Feet?: Yes        Picking Up Object  Assistance Needed: Supervision or touching assistance  Comment: supervision due to unsteadiness and needing to put hand back on walker often to catch balance  CARE Score: 4  Discharge Goal: Supervision or touching assistance    Wheelchair Ability  Uses a Wheelchair and/or Scooter?: Yes    Wheel 50 Feet with Two Turns  Assistance Needed: Independent  Comment: B UE propulsion  CARE Score: 6  Discharge Goal: Independent    Wheel 150 Feet  Assistance Needed: Independent  Comment: B UE propulsion  CARE Score: 6  Discharge Goal: Independent            Walking Ability  Does the Patient Walk?: Yes    Walk 10 Feet  Assistance Needed: Independent  Comment: 2ww  CARE Score: 6  Discharge Goal: Independent    Walk 50 Feet with Two Turns  Assistance Needed: Independent  Comment: 2ww  CARE Score: 6  Discharge Goal: Independent    Walk 150 Feet  Comment: distance contraindicated with L foot wounds  Reason if not Attempted: Not attempted due to medical condition or safety concerns  CARE Score: 88  Discharge Goal: Not Attempted    Walking 10 Feet on Uneven Surfaces  Assistance Needed: Supervision or touching assistance  Comment: CG with 2ww on cement sidewalk, pressed stone, carpet with bean bags  CARE Score: 4  Discharge Goal: Supervision or touching assistance    1 Step (Curb)  Assistance Needed: Partial/moderate assistance  Comment: min assist with 2ww  CARE Score: 3  Discharge Goal: Supervision or touching assistance    4 Steps  Comment: curb step only ; unsafe for performance and contraindicated 2/2 wound healing  Reason if not Attempted: Not attempted due to medical condition or safety concerns  CARE Score: 88  Discharge Goal: Not Attempted    12 Steps  Comment: curb step only ; unsafe for performance and contraindicated 2/2 wound healing  Reason if not Attempted: Not attempted due to medical condition or safety concerns  CARE Score: 88  Discharge Goal: Not Attempted      Additional Therapeutic activities/exercises completed this date:     []   Nu-step:  Time:        Level:         #Steps:       []   Rebounder:    []  Seated     []  Standing        []   Balance training         []   Postural training    []   Supine ther ex (reps/sets):     []   Seated ther ex (reps/sets):     []   Standing ther ex (reps/sets):     []   Other: Toileting activity completed with    []   Other:    Comments:      Patient/Caregiver Education and Training:   []   Role of PT  []   Education about Dx  []   Use of call light for assist   [x]   HEP provided and explained   [x]   Treatment plan reviewed  [x]   Home safety  []   Wheelchair mobility/management   []   Body mechanics  [x]   Bed Mobility/Transfer technique  [x]   Gait technique/sequencing  []   Proper use of assistive device/adaptive equipment  [x]   Stair training/Advanced mobility safety and technique  []   Reinforced patient's precautions/mobility while maintaining precautions  []   Postural awareness  []   Family/caregiver training  []   Progress was updated and reviewed in Rehabtracker with patient and/or family this date. []   Other:    Treatment Plan for Next Session: n/a      Assessment:   This pt has met all goals except curb step. Pt demonstrates weakness and balance issues paired with safety issues that necessitated min assist on 4\" curb step. Pt demonstrates independence from w/c level including parts management of w/c. Pt is recommended to limit walking due to L foot wound, but is independent up to 50' with 2ww and cast shoe on L foot. [facility] continue strengthening and balance and for transition to home alone.      Treatment/Activity Tolerance:   [x] Tolerated treatment with no adverse effects    [] Patient limited by fatigue  [] Patient limited by pain   [] Patient limited by medical complications:    [] Adverse reaction to Tx:   [] Significant change in status    Safety:       []  bed alarm set    []  chair alarm set    []  Pt refused alarms                []  Telesitter activated      [x]  Gait belt used during tx session      []other:       Number of Minutes/Billable Intervention  Gait Training 25   Therapeutic Exercise    Neuro Re-Ed    Therapeutic Activity 10   Wheelchair Propulsion 20   Group    Other:    TOTAL 60     Social History  Social/Functional History  Lives With: Alone  Type of Home: House  Home Layout: Two level, Able to Live on Main level with bedroom/bathroom  Home Access: Ramped entrance  Bathroom Shower/Tub: Tub/Shower unit (TTB)  Bathroom Toilet: Standard  Bathroom Equipment: Tub transfer bench  Bathroom Accessibility: Accessible  Home Equipment: Walker, rolling, Ltanya Ayala  Has the patient had two or more falls in the past year or any fall with injury in the past year?: Yes (Pt with 3-4 falls in the last year with no significant injuries.)  ADL Assistance: Independent  Homemaking Assistance: Independent  Homemaking Responsibilities: Yes  Meal Prep Responsibility: Primary  Laundry Responsibility: Primary  Cleaning Responsibility: Primary  Bill Paying/Finance Responsibility: Primary  Shopping Responsibility: Primary  Dependent Care Responsibility: No  Health Care Management: Primary  Ambulation Assistance: Independent (Pt has been using 2WW for the last couple months and manual w/c for a couple days prior to admission.)  Transfer Assistance: Independent  Active : Yes  Mode of Transportation: Car  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 Pt with 3-4 falls in the last year. Objective                                                                                    Goals:  (Update in navigator)  Short Term Goals  Time Frame for Short term goals: 10-12 days STG=LTG  Short term goal 1: Pt will perform sit to stand, w/c<>bed and car transfers with mod I  Short term goal 2: Pt will ambulate 48' with 2ww onlevel surfaces with mod I, 10' on unlevel surface with supervision  Short term goal 3: Pt will ascend/descend 2\" step with 2ww with supervision  Short term goal 4: Pt will  light object with 2ww and reacher with CGA  Short term goal 5: Pt will perform w/c mobility in household setting with mod I:   :        Plan of Care                                                                              Times per week: 5 days per week for a minimum of 60 minutes/day plus group as appropriate for 60 minutes.   Treatment to include Current Treatment Recommendations: Strengthening, ROM, Balance training, Functional mobility training, Transfer training, ADL/Self-care training, IADL training, Cognitive/Perceptual training, Endurance training, Wheelchair mobility training, Gait training, Neuromuscular re-education, Safety education & training, Therapeutic activities, Patient/Caregiver education & training, Equipment evaluation, education, & procurement, Positioning, Home exercise program, Pain management, Stair training    Electronically signed by   Tomy Collins PT,   9/16/2022, 9:21 AM

## 2022-09-16 NOTE — PROGRESS NOTES
[facility] transport had to cancel transportation for tomorrow d/t lack of drivers and planned to set pt up with [facility]. Pt reports that his w/c and walker are inside his house and that he does not have any one to assist him into house. This nurse attempted to set up stretcher transport through [facility] but was unsuccessful. Notified  Dixie. Dixie states she will work on it when able.  To relay progress with relieving charge nurse Hay Mata

## 2022-09-16 NOTE — CARE COORDINATION
Spoke with [employer]. They can accept patient and will call the patient today.     Shiv Marie [phone]

## 2022-09-16 NOTE — CARE COORDINATION
ARU  Discharge Summary    D/C Date: 09/17/2022    Patient discharged to: Home    Transported by: [facility] [phone]. Patient has transport schedule for discharge 09/17/2022   between 12pm-1pm. Trip # [id number]    Referrals made to: [facility]    Additional information: Scheduled follow up with [address], APRN - CNP Tuesday Sep 20, 2022 9:00 AM, Moo Ingram PA-C Tuesday Sep 20, 2022 1:30 PM, Post-OP Cal Cogan, APRN - CNP Wednesday Sep 21, 2022 11:00 AM    Patient notified and BIMS completed. LSW assisted in putting request for transportation with [employer]. The transport company to call patient directly with  times.

## 2022-09-16 NOTE — PROGRESS NOTES
Occupational Therapy    Physical Rehabilitation: OCCUPATIONAL THERAPY     [x] daily progress note       [] discharge       Patient Name:  Dorothy Crowell   :  1980 MRN: [id number]  Room:  [address] [place] [address] Date of Admission: 2022  Rehabilitation Diagnosis:   Necrotizing fasciitis [M72.6]  Necrotizing fasciitis ([address] [M72.6]       Date 2022       Day of ARU Week:  2   Time IN/OUT [id number]   Individual Tx Minutes 70   Group Tx Minutes    Co-Treat Minutes    Concurrent Tx Minutes    TOTAL Tx Time Mins 70   Variance Time +10 (Informed OT-Genny of variance)    Variance Time []   Refusal due to:     []   Medical hold/reason:    []   Illness   []   Off Unit for test/procedure  []   Extra time needed to complete task  []   Therapeutic need  []   Other (specify):   Restrictions Restrictions/Precautions: General Precautions, Fall Risk, Weight Bearing         Communication with other providers: [x]   OK to see per nursing:     []   Spoke with team member regarding:      Subjective observations and cognitive status: Pt sitting up in recliner on approach; pleasant and agreeable to therapy session. Pain level/location:    /10       Location: none    Discharge recommendations  Anticipated discharge date:    Destination: []home alone   [x]home alone w assist prn   [] home w/ family    [] Continuous supervision       []SNF    [] Assisted living     [] Other:   Continued therapy: []HHC OT  []OUTPATIENT  OT   [x] No Further OT  Equipment needs: None, has BSC and TTB       ADLs:    Eating: Eating  Assistance Needed: Independent  Comment: Pt able to open all packages/containers and feed self  CARE Score: 6  Discharge Goal: Independent       Oral Hygiene: Oral Hygiene  Assistance Needed: Independent  Comment: Mod I using mouthwash to rinse seated in wc  CARE Score: 6  Discharge Goal: Independent    UB/LB Bathing: Shower/Bathe Self  Assistance Needed: Independent  Comment:  Mod I seated entirety of shower; weight shifts to wash bottom  CARE Score: 6  Discharge Goal: Independent    UB Dressing: Upper Body Dressing  Assistance Needed: Independent  Comment: Pt able to retrieve clothing from ; michelle/doff shirt  CARE Score: 6  Discharge Goal: Independent         LB Dressing: Lower Body Dressing  Assistance Needed: Independent  Comment: Pt able to retrieve clothing from [facility]; mod I in stance to don over hips  CARE Score: 6  Discharge Goal: Independent    Donning and [place] Footwear: Putting On/Taking Off Footwear  Assistance Needed: Independent  Comment: Pt able to doff/don sock and shoe  CARE Score: 6  Discharge Goal: Independent      Toiletin [address] needed: Independent  Comment: Independent for clothing management and hygiene using grab bars for balance  CARE Score: 6  Discharge Goal: Independent      Toilet Transfers: Mod I  Toilet Transfer  Assistance needed: Independent  Comment: Independent using grab bars  CARE Score: 6  Discharge Goal: Independent  Device Used:    []   Standard Toilet         []   Grab Bars           []  Bedside Commode       []   Elevated Toilet          []   Other:        Bed Mobility:           [x]   Pt received out of bed     Transfers:    Sit--> Stand: Mod I   Stand --> Sit:   Mod I   Stand-Pivot: Mod I   Other:    Assistive device required for transfer:   RW      Functional Mobility:  throughout room in [facility] + a community distance on uneven surfaces   Assistance: Mod I   Device:   []   Rolling Walker     []   Standard Walker [x]   Wheelchair        []   Nubia Calles       []   4-Wheeled Miriam Palacios         []   Cardiac Walker       []   Other:        Homemaking Tasks:   Pt completed tub transfer training c use of Tub Transfer Bench and completed x1 transfer c mod I. Pt completed education/training for compensatory strategies to increase tub/shower transfer skills required for d/c.          Additional Therapeutic activities/exercises completed this date:     [x]   ADL Training   [x] Balance/Postural training     []   Bed/Transfer Training   []   Endurance Training   []   Neuromuscular Re-ed   []   Nu-step:  Time:        Level:         #Steps:       []   Rebounder:    []  Seated     []  Standing        []   Supine Ther Ex (reps/sets):     [x]   Seated Ther Ex (reps/sets): To increase UB strength for functional transfers and ADLs, Pt completed 6 sets of 10 reps c 52.8# on davy.       []   Standing Ther Ex (reps/sets):     []   Other:      Comments: All intervention performed to increase pt's strength, endurance, ax tolerance, and balance in prep for increased I c ADL/IADLs and functional transfers/mobility. Patient/Caregiver Education and Training:   []   [employer] Equipment Use  []   Bed Mobility/Transfer Technique/Safety  []   Energy Conservation Tips  []   Family training  []   Postural Awareness  []   Safety During Functional Activities  []   Reinforced Patient's Precautions   []   Progress was updated and reviewed in Rehabtracker with patient and/or family this         date. Treatment Plan for Next Session: D/C 9/17       Assessment: This pt demonstrated a positive response to today's treatment as evidenced by demo'ing independence with ADLs and functional transfers.     Treatment/Activity Tolerance:   [x] Tolerated treatment with no adverse effects    [] Patient limited by fatigue  [] Patient limited by pain   [] Patient limited by medical complications:    [] Adverse reaction to Tx:   [] Significant change in status    Safety:       []  bed alarm set    []  chair alarm set    []  Pt refused alarms                []  Telesitter activated      [x]  Gait belt used during tx session      []other:       Number of Minutes/Billable Intervention  Therapeutic Exercise 15   ADL Self-care 30   Neuro Re-Ed    Therapeutic Activity 25   Group    Other:    TOTAL 70       Social History  Social/Functional History  Lives With: Alone  Type of Home: House  Home Layout: Two level, Able to Live on Main level with bedroom/bathroom  Home Access: Ramped entrance  Bathroom Shower/Tub: Tub/Shower unit (TTB)  Bathroom Toilet: Standard  Bathroom Equipment: Tub transfer bench  Bathroom Accessibility: Accessible  Home Equipment: Emily Manifold, rolling, Eric Skippers  Has the patient had two or more falls in the past year or any fall with injury in the past year?: Yes (Pt with 3-4 falls in the last year with no significant injuries.)  ADL Assistance: Independent  Homemaking Assistance: Independent  Homemaking Responsibilities: Yes  Meal Prep Responsibility: Primary  Laundry Responsibility: Primary  Cleaning Responsibility: Primary  Bill Paying/Finance Responsibility: Primary  Shopping Responsibility: Primary  Dependent Care Responsibility: No  Health Care Management: Primary  Ambulation Assistance: Independent (Pt has been using 2WW for the last couple months and manual w/c for a couple days prior to admission.)  Transfer Assistance: Independent  Active : Yes  Mode of Transportation: Car  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 Pt with 3-4 falls in the last year. Objective                                                                                    Goals:  (Update in navigator)  Short Term Goals  Time Frame for Short term goals: STGs=LTGs:  Long Term Goals  Time Frame for Long term goals : 10 days or until d/c. Long Term Goal 1: Pt will complete oral care and grooming tasks c Mod I.  Long Term Goal 2: Pt will complete total body bathing c AE PRN and Mod I.  Long Term Goal 3: Pt will complete UB dressing c IND. Long Term Goal 4: Pt will complete LB dressing c AE PRN and Mod I.  Long Term Goal 5: Pt will doff/don footwear to L foot c AE PRN and Mod I.   Additional Goals?: Yes  Long Term Goal 6: Pt will complete toileting c Mod I.  Long Term Goal 7: Pt will perform functional transfers (bed, chair, toilet, shower) c DME PRN and Mod I.  Long Term Goal 8: Pt will perform therex/therax to facilitate an increase in strength/endurance (c emphasis on dynamic standing balance/tolerance > 8 mins) c Mod I.  Long Term Goal 9: Pt will complete home management tasks c Mod I.:        Plan of Care                                                                              Times per week: 5 days per week for a minimum of 60 minutes/day plus group as appropriate for 60 minutes.   Treatment to include Plan  Times per Day: Daily  Current Treatment Recommendations: Strengthening, Functional mobility training, Endurance training, Pain management, Safety education & training, Patient/Caregiver education & training, Equipment evaluation, education, & procurement, Positioning, Self-Care / ADL, Home management training, Balance training, Coordination training    Electronically signed by   HEMANT Baldwin,  9/16/2022, 11:59 AM

## 2022-09-16 NOTE — PLAN OF CARE
Problem: Skin/Tissue Integrity  Goal: Absence of new skin breakdown  Description: 1. Monitor for areas of redness and/or skin breakdown  2. Assess vascular access sites hourly  3. Every 4-6 hours minimum:  Change oxygen saturation probe site  4. Every 4-6 hours:  If on nasal continuous positive airway pressure, respiratory therapy assess nares and determine need for appliance change or resting period.   Outcome: Progressing     Problem: Safety - Adult  Goal: Free from fall injury  Outcome: Progressing     Problem: Nutrition Deficit:  Goal: Optimize nutritional status  Outcome: Progressing  Flowsheets (Taken 9/15/2022 1324 by Km Durham RD, LD)  Nutrient intake appropriate for improving, restoring, or maintaining nutritional needs:   Monitor oral intake, labs, and treatment plans   Recommend appropriate diets, oral nutritional supplements, and vitamin/mineral supplements

## 2022-09-16 NOTE — PROGRESS NOTES
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 Pain is slowly improving. No GI upset with his medications. No chills. Prior (baseline) level of function: Independent. Current level of function:         Current  IRF-DAYNA and Goals:   Occupational Therapy:    Short Term Goals  Time Frame for Short term goals: STGs=LTGs :   Long Term Goals  Time Frame for Long term goals : 10 days or until d/c. Long Term Goal 1: Pt will complete oral care and grooming tasks c Mod I.  Long Term Goal 2: Pt will complete total body bathing c AE PRN and Mod I.  Long Term Goal 3: Pt will complete UB dressing c IND. Long Term Goal 4: Pt will complete LB dressing c AE PRN and Mod I.  Long Term Goal 5: Pt will doff/don footwear to L foot c AE PRN and Mod I. Additional Goals?: Yes  Long Term Goal 6: Pt will complete toileting c Mod I.  Long Term Goal 7: Pt will perform functional transfers (bed, chair, toilet, shower) c DME PRN and Mod I.  Long Term Goal 8: Pt will perform therex/therax to facilitate an increase in strength/endurance (c emphasis on dynamic standing balance/tolerance > 8 mins) c Mod I.  Long Term Goal 9: Pt will complete home management tasks c Mod I. :                                       Eating: Eating  Assistance Needed: Independent  Comment: Ind per patient report  CARE Score: 6  Discharge Goal: Independent       Oral Hygiene: Oral Hygiene  Assistance Needed: Independent  Comment:  Mod I Rinses with  mouthwash seated sinkside  CARE Score: 6  Discharge Goal: Independent    UB/LB Bathing: Shower/Bathe Self  Assistance Needed: Supervision or touching assistance  Comment: Sup  seated entirety of shower weigt shifts to wash buttocks/periarea  CARE Score: 4  Discharge Goal: Independent    UB Dressing: Upper Body Dressing  Assistance Needed: Independent  Comment: X  CARE Score: 6  Discharge Goal: Independent         LB Dressing: Lower Body Dressing  Assistance Needed: Supervision or touching assistance  Comment: Sup/Mod I in stance at sink  counter using hip for balance  CARE Score: 4  Discharge Goal: Independent    Donning and [place] Footwear: Putting On/Taking Off Footwear  Assistance Needed: Independent  Comment: X  CARE Score: 6  Discharge Goal: Independent      Toiletin [address] needed: Supervision or touching assistance  Comment: Sup to manage clothes in stance completes hygiene seated  CARE Score: 4  Discharge Goal: Independent      Toilet Transfers:   Toilet Transfer  Assistance needed: Supervision or touching assistance  Comment: Sup  CARE Score: 4  Discharge Goal: Independent    Physical Therapy:   Short Term Goals  Time Frame for Short term goals: 10-12 days STG=LTG  Short term goal 1: Pt will perform sit to stand, w/c<>bed and car transfers with mod I  Short term goal 2: Pt will ambulate 48' with 2ww onlevel surfaces with mod I, 10' on unlevel surface with supervision  Short term goal 3: Pt will ascend/descend 2\" step with 2ww with supervision  Short term goal 4: Pt will  light object with 2ww and reacher with CGA  Short term goal 5: Pt will perform w/c mobility in household setting with mod I            Bed Mobility:   Sit to Lying  Assistance Needed: Independent  CARE Score: 6  Discharge Goal: Independent  Roll Left and Right  Assistance Needed: Independent  Comment: no rails needed  CARE Score: 6  Discharge Goal: Independent  Lying to Sitting on Side of Bed  Assistance Needed: Independent  CARE Score: 6  Discharge Goal: Independent    Transfers:    Sit to Stand  Assistance Needed: Supervision or touching assistance  Comment: sup for up to recliner  CARE Score: 4  Chair/Bed-to-Chair Transfer  Assistance Needed: Supervision or touching assistance  Comment: sup w/ FWW ; inc time and effort  CARE Score: 4  Discharge Goal: Independent     Car Transfer  Assistance Needed: Independent  Comment: mod ind  CARE Score: 6  Discharge Goal: Independent    Ambulation:    Walking Ability  Does the Patient Walk?: Yes     Walk 10 Feet  Assistance Needed: Supervision or touching assistance  Comment: SBA w/ FWW ; hop to  CARE Score: 4  Discharge Goal: Independent     Walk 50 Feet with Two Turns  Assistance Needed: Supervision or touching assistance  Comment: sup w/ hop to pattern  CARE Score: 4  Discharge Goal: Independent     Walk 150 Feet  Comment: distance contradictory 2/2 L heel wound w/ impaired healing ; max of 110 ft w/ FWW  Reason if not Attempted: Not attempted due to medical condition or safety concerns  CARE Score: 88  Discharge Goal: Not Attempted     Walking 10 Feet on Uneven Surfaces  Assistance Needed: Supervision or touching assistance  Comment: CGA w/ FWW ; inc time and effort, inferior gaze  CARE Score: 4  Discharge Goal: Supervision or touching assistance     1 Step (Curb)  Assistance Needed: Supervision or touching assistance  Comment: CGA w/ FWW ; mod cues  Reason if not Attempted: Not attempted due to medical condition or safety concerns  CARE Score: 4  Discharge Goal: Supervision or touching assistance     4 Steps  Comment: curb step only ; unsafe for performance and contraindicated 2/2 wound healing  Reason if not Attempted: Not attempted due to medical condition or safety concerns  CARE Score: 88  Discharge Goal: Not Attempted     12 Steps  Comment: curb step only ; unsafe for performance and contraindicated 2/2 wound healing  Reason if not Attempted: Not attempted due to medical condition or safety concerns  CARE Score: 88  Discharge Goal: Not Attempted       Wheelchair:  w/c Ability: Wheelchair Ability  Uses a Wheelchair and/or Scooter?: Yes  Wheel 50 Feet with Two Turns  Assistance Needed: Independent  Comment: ROBERT UE propulsion  CARE Score: 6  Discharge Goal: Independent  Wheel 150 Feet  Assistance Needed: Independent  Comment: ROBERT UE propulsion  CARE Score: 6  Discharge Goal: Independent          Balance:        Object: Picking Up Object  Assistance Needed: Independent  Comment: set-up for object retrieval ; mod ind  CARE Score: 6  Discharge Goal: Supervision or touching assistance    I      Exam:    Blood pressure (!) 155/90, pulse 77, temperature 98.1 °F (36.7 °C), temperature source Oral, resp. rate 17, height 6' (1.829 m), weight 173 lb 1 oz (78.5 kg), SpO2 96 %. General: Up in a wheelchair. Propelling it on the minute himself. Alert. HEENT: Talkative. Clear speech. Symmetric facial expression. Neck supple. Pulmonary: Unlabored and clear. Cardiac: Regular rate and rhythm. Abdomen: Patient's abdomen is soft and nondistended. Bowel sounds were present throughout. There was no rebound, guarding or masses noted. Upper extremities: Functional  strength with fair coordination. No new bruising or swelling. Lower extremities: His stump incision is well approximated and staples are intact. Knee movement is free and full. Left calf is soft and the wound of his left foot is dressed with negligible drainage. No significant discoloration. Sitting balance was good. Standing balance was poor.     Lab Results   Component Value Date    WBC 11.6 (H) 09/08/2022    HGB 11.8 (L) 09/08/2022    HCT 36.9 (L) 09/08/2022    MCV 91.1 09/08/2022     (H) 09/08/2022     Lab Results   Component Value Date    INR 0.89 05/20/2022    INR 1.14 04/19/2022    INR 1.27 06/12/2021    PROTIME 11.5 (L) 05/20/2022    PROTIME 14.7 (H) 04/19/2022    PROTIME 15.4 (H) 06/12/2021     Lab Results   Component Value Date    CREATININE 1.7 (H) 09/12/2022    BUN 14 09/12/2022     09/12/2022    K 3.7 09/12/2022    CL 99 09/12/2022    CO2 30 09/12/2022     Lab Results   Component Value Date    ALT 15 09/04/2022    AST 24 09/04/2022    ALKPHOS 73 09/04/2022    BILITOT 0.3 09/04/2022       Expected length of stay  prior to a supervised level of function for discharge home with a wheelchair and [facility] [address] OT/PT is 9/17/2022. Recommendations:    Necrotizing fasciitis of the right lower limb with amputation: Wheelchair level of mobility is the safest for him now to perform activities in the daily occupational and physical therapy. His left leg and knee control is too poor to do significant hopping yet. No fever while off antibiotics. Surgical follow-up in the office in about 2 weeks. He benefits from aggressive pulmonary management, wound care and DVT prophylaxis. Continuing to practice techniques for self-care activities and mobility with no weightbearing through the right lower limb. Caregiver training scheduled before discharge. Local wound care, nutritional support and wound care of the left foot. Outpatient follow-up with his surgeon and PCP. Verbal cues and SBA for transfers. DVT prophylaxis: Lovenox 40 mg subcu daily. I must monitor his hemoglobin and platelet count while on this medication. Weightbearing activities are limited but are tried through the left foot daily. GI prophylaxis is offered. No new bruising or swelling. Anticipate stopping the Lovenox at discharge. Uncontrolled pain: His needs are being met with oral oxycodone as needed with the scheduled acetaminophen. Bowel intervention has been successful. Limb elevation and progressive mobilization. Necrotizing fasciitis: IV antibiotics were tolerated well and have been discontinued. Monitoring him for signs of further soft tissue compromise.   Monitoring his fever curve and for any leukocytosis. Uncontrolled diabetes type 2 with peripheral neuropathy: Patient requires a diet modified for carbohydrates. He is on scheduled Lantus and Humalog sliding scale. Blood sugars are checked at mealtime and bedtime. Hypertension: Cozaar and Toprol for blood pressure regulation. Vital signs are checked at rest and with activity. Target systolic blood pressures 940-010. His blood pressure is above the target range again today. Pneumatosis coli: He has completed a full course of antibiotics. Outpatient follow-up with gastroenterology with reimaging in the near future. Monitoring him for GI symptoms. Left foot diabetic ulcer: Wound care team is directing the management of the left foot's local wound care. Monitoring drainage and tenderness. He is tolerating the weightbearing through the left heel and plantar surface for now. Depression with anxiety: Sleep regulation, pain management and treatment in a calm and consistent environment. Caregiver training and involved in the planning for discharge.

## 2022-09-16 NOTE — PROGRESS NOTES
Occupational Therapy  Physical Rehabilitation: OCCUPATIONAL THERAPY     [x] daily progress note       [] discharge       Patient Name:  Andrés Montoya   :  1980 MRN: [id number]  Room:  [address] [place], [place] [address] Date of Admission: 2022  Rehabilitation Diagnosis:   Necrotizing fasciitis [M72.6]  Necrotizing fasciitis ([address] [M72.6]       Date 2022       Day of ARU Week:  2   Time IN/OUT 1505/1555   Individual Tx Minutes 50   Group Tx Minutes    Co-Treat Minutes    Concurrent Tx Minutes    TOTAL Tx Time Mins 50   Variance Time    Variance Time []   Refusal due to:     []   Medical hold/reason:    []   Illness   []   Off Unit for test/procedure  []   Extra time needed to complete task  []   Therapeutic need  []   Other (specify):   Restrictions Restrictions/Precautions: General Precautions, Fall Risk, Weight Bearing         Communication with other providers: [x]   OK to see per nursing:     []   Spoke with team member regarding:      Subjective observations and cognitive status: Pt in bed on approach, agreeable to tx session. Pain level/location:    /10       Location: Denied   Discharge recommendations  Anticipated discharge date:    Destination: []home alone   [x]home alone w assist prn   [] home w/ family    [] Continuous supervision       []SNF    [] Assisted living     [] Other:   Continued therapy: []HHC OT  []OUTPATIENT  OT   [x] No Further OT  Equipment needs: None, has BSC and TTB     Toileting:    Denied need        Bed Mobility:           []   Pt received out of bed   Supine --> Sit:  Mod I   Sit --> Supine: Mod I     Transfers:    Sit--> Stand: Mod I   Stand --> Sit:   Mod I   Stand-Pivot: Mod I   Other:    Assistive device required for transfer:   W/C       Functional Mobility:    Assistance:   Mod I ~400 ft total during session  Device:   []   [address] [facility]     []   Standard Walker [x]   Wheelchair        []   [employer]       []   4-Wheeled Valeri Suarez         []   Cardiac Walker       []   Other: Additional Therapeutic activities/exercises completed this date:     []   ADL Training   [x]   Balance/Postural training : Pt performed 3 stands ranging from 2.5-3 mins each at counter with supervision while completing dynamic stand task while reaching outside base of support to facilitate increased balance for ADL tasks and transfers. Pt always relied on LUE to maintain balance. [x]   Bed/Transfer Training   [x]   Endurance Training   []   Neuromuscular Re-ed   []   Nu-step:  Time:        Level:         #Steps:       []   Rebounder:    []  Seated     []  Standing        []   Supine Ther Ex (reps/sets):     [x]   Seated Ther Ex (reps/sets): To increase BUE endurance for ADLs and transfers/mobility pt completed arm ergometer on mod resistance x15 mins, average 35 RPM     To increase BUE endurance for ADLs and transfers, pt completed 1 set x10 reps of the following therex, alternating UEs, c a 5# weight:   Shoulder presses  Chest presses  Shoulder horizontal abduction/adduction  Concentric arm circles (clockwise)  Bicep curls        Comments: All intervention performed to increase pt's strength, endurance, ax tolerance, and balance in prep for increased I c ADL/IADLs and functional transfers/mobility. Patient/Caregiver Education and Training:   []   [employer] Equipment Use  [x]   Bed Mobility/Transfer Technique/Safety  []   Energy Conservation Tips  []   Family training  [x]   Postural Awareness  [x]   Safety During Functional Activities  []   Reinforced Patient's Precautions   []   Progress was updated and reviewed in Rehabtracker with patient and/or family this         date. Treatment Plan for Next Session: Planned d/c from ARU next date      Assessment: This pt demonstrated a positive response to today's treatment as evidenced by meeting OT goals.          Treatment/Activity Tolerance:   [x] Tolerated treatment with no adverse effects    [] Patient limited by fatigue  [] Patient limited by pain   [] Patient limited by medical complications:    [] Adverse reaction to Tx:   [] Significant change in status    Safety:       [x]  bed alarm set    []  chair alarm set    []  Pt refused alarms                []  Telesitter activated      [x]  Gait belt used during tx session      []other:       Number of Minutes/Billable Intervention  Therapeutic Exercise 30   ADL Self-care    Neuro Re-Ed    Therapeutic Activity 20   Group    Other:    TOTAL 50       Social History  Social/Functional History  Lives With: Alone  Type of Home: House  Home Layout: Two level, Able to Live on Main level with bedroom/bathroom  Home Access: Ramped entrance  Bathroom Shower/Tub: Tub/Shower unit (TTB)  Bathroom Toilet: Standard  Bathroom Equipment: Tub transfer bench  Bathroom Accessibility: Accessible  Home Equipment: Chano Alstrom, rolling, Briseyda Keepers  Has the patient had two or more falls in the past year or any fall with injury in the past year?: Yes (Pt with 3-4 falls in the last year with no significant injuries.)  ADL Assistance: Independent  Homemaking Assistance: Independent  Homemaking Responsibilities: Yes  Meal Prep Responsibility: Primary  Laundry Responsibility: Primary  Cleaning Responsibility: Primary  Bill Paying/Finance Responsibility: Primary  Shopping Responsibility: Primary  Dependent Care Responsibility: No  Health Care Management: Primary  Ambulation Assistance: Independent (Pt has been using 2WW for the last couple months and manual w/c for a couple days prior to admission.)  Transfer Assistance: Independent  Active : Yes  Mode of Transportation: Car  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 Pt with 3-4 falls in the last year.     Objective                                                                                    Goals:  (Update in navigator)  Short Term Goals  Time Frame for Short term goals: STGs=LTGs:  Long Term Goals  Time Frame for Long term goals : 10 days or until d/c. Long Term Goal 1: Pt will complete oral care and grooming tasks c Mod I. MET  Long Term Goal 2: Pt will complete total body bathing c AE PRN and Mod I.  MET  Long Term Goal 3: Pt will complete UB dressing c IND. MET  Long Term Goal 4: Pt will complete LB dressing c AE PRN and Mod I.  MET  Long Term Goal 5: Pt will doff/don footwear to L foot c AE PRN and Mod I.  MET  Additional Goals?: Yes  Long Term Goal 6: Pt will complete toileting c Mod I.  MET  Long Term Goal 7: Pt will perform functional transfers (bed, chair, toilet, shower) c DME PRN and Mod I.  MET  Long Term Goal 8: Pt will perform therex/therax to facilitate an increase in strength/endurance (c emphasis on dynamic standing balance/tolerance > 8 mins) c Mod I.  MET  Long Term Goal 9: Pt will complete home management tasks c Mod I.:      MET using W/C    Plan of Care                                                                              Times per week: 5 days per week for a minimum of 60 minutes/day plus group as appropriate for 60 minutes. Treatment to include Plan  Times per Day: Daily  Current Treatment Recommendations: Strengthening, Functional mobility training, Endurance training, Pain management, Safety education & training, Patient/Caregiver education & training, Equipment evaluation, education, & procurement, Positioning, Self-Care / ADL, Home management training, Balance training, Coordination training    Electronically signed by   Woo Rivera MS, OTR/L  License #[id number]  9/16/2022, 3:22 PM

## 2022-09-16 NOTE — CARE COORDINATION
Phone call to Pt. Pt reported he was provided with food from a food pantry that he can take home with him. Discussed with Pt about referral for Assisted Living. Pt did report he would like to go into assisted living and was in agreement with this SW making a referral to AL waiver. Pt stated he would like to be in the [place] area to be closer to his brother. Pt reported he spoke with someone who will come to the house Monday or Tuesday to complete an assessment for [facility]. Phone call to Lisa with [employer] who did agree to make both referrals as there is often a wait list for Assisted Living waivers. Lisa reported there is a minimum income amount of 850/month, stating all income would go to AL and Pt would receive $50 / month for spending. Phone call to Pt to review the income limit and how his income would got to AL and he would receive $50 / month. Pt stated his income is 1400 and he was in agreement to move forward with the AL waiver referral.     Phone call to Lisa with [employer] and referral was made for AL waiver. KOLE plan of care:  SW Will follow up with Pt on 9/21 regarding his adjustment back home.

## 2022-09-17 VITALS
DIASTOLIC BLOOD PRESSURE: 103 MMHG | OXYGEN SATURATION: 97 % | HEIGHT: 72 IN | TEMPERATURE: 98.7 F | WEIGHT: 173.06 LBS | BODY MASS INDEX: 23.44 KG/M2 | SYSTOLIC BLOOD PRESSURE: 153 MMHG | HEART RATE: 78 BPM | RESPIRATION RATE: 16 BRPM

## 2022-09-17 LAB
GLUCOSE BLD-MCNC: 112 MG/DL (ref 70–99)
GLUCOSE BLD-MCNC: 119 MG/DL (ref 70–99)
GLUCOSE BLD-MCNC: 179 MG/DL (ref 70–99)

## 2022-09-17 PROCEDURE — 99239 HOSP IP/OBS DSCHRG MGMT >30: CPT | Performed by: PHYSICAL MEDICINE & REHABILITATION

## 2022-09-17 PROCEDURE — 6370000000 HC RX 637 (ALT 250 FOR IP): Performed by: STUDENT IN AN ORGANIZED HEALTH CARE EDUCATION/TRAINING PROGRAM

## 2022-09-17 PROCEDURE — 82962 GLUCOSE BLOOD TEST: CPT

## 2022-09-17 PROCEDURE — 94761 N-INVAS EAR/PLS OXIMETRY MLT: CPT

## 2022-09-17 PROCEDURE — 6370000000 HC RX 637 (ALT 250 FOR IP): Performed by: PHYSICAL MEDICINE & REHABILITATION

## 2022-09-17 RX ADMIN — OXYCODONE HYDROCHLORIDE 5 MG: 5 TABLET ORAL at 02:49

## 2022-09-17 RX ADMIN — DOCUSATE SODIUM 100 MG: 100 CAPSULE, LIQUID FILLED ORAL at 09:56

## 2022-09-17 RX ADMIN — SUCRALFATE 1 G: 1 TABLET ORAL at 09:56

## 2022-09-17 RX ADMIN — OXYCODONE HYDROCHLORIDE 5 MG: 5 TABLET ORAL at 09:55

## 2022-09-17 RX ADMIN — METOPROLOL SUCCINATE 25 MG: 25 TABLET, EXTENDED RELEASE ORAL at 09:56

## 2022-09-17 RX ADMIN — LOSARTAN POTASSIUM 25 MG: 25 TABLET, FILM COATED ORAL at 09:56

## 2022-09-17 RX ADMIN — FLUOXETINE 80 MG: 20 CAPSULE ORAL at 09:56

## 2022-09-17 RX ADMIN — PANTOPRAZOLE SODIUM 20 MG: 20 TABLET, DELAYED RELEASE ORAL at 06:03

## 2022-09-17 ASSESSMENT — PAIN DESCRIPTION - DESCRIPTORS
DESCRIPTORS: ACHING
DESCRIPTORS: ACHING

## 2022-09-17 ASSESSMENT — PAIN DESCRIPTION - LOCATION
LOCATION: HEAD
LOCATION: HEAD

## 2022-09-17 ASSESSMENT — ENCOUNTER SYMPTOMS
SINUS PAIN: 0
BLOOD IN STOOL: 0
NAUSEA: 0
COUGH: 0
EYE PAIN: 0
SINUS PRESSURE: 0
ABDOMINAL PAIN: 0
BACK PAIN: 0
SHORTNESS OF BREATH: 0
EYE DISCHARGE: 0
VOMITING: 0
DIARRHEA: 0
CHEST TIGHTNESS: 0
VOICE CHANGE: 0

## 2022-09-17 ASSESSMENT — PAIN DESCRIPTION - ORIENTATION: ORIENTATION: OTHER (COMMENT)

## 2022-09-17 ASSESSMENT — PAIN SCALES - GENERAL
PAINLEVEL_OUTOF10: 7
PAINLEVEL_OUTOF10: 3
PAINLEVEL_OUTOF10: 7

## 2022-09-17 ASSESSMENT — PAIN - FUNCTIONAL ASSESSMENT: PAIN_FUNCTIONAL_ASSESSMENT: PREVENTS OR INTERFERES SOME ACTIVE ACTIVITIES AND ADLS

## 2022-09-17 NOTE — CARE COORDINATION
Rajan ly received VM from \[facility]\"" that patient's dc transport was cancelled due to lack of staff. She requested transport assistance. Case mgnacho arranged ambulance transport 9/17 @1300 to the patient's home. [facility]. Lennice Oppenheim, RN.

## 2022-09-17 NOTE — PROGRESS NOTES
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 Minor stump and missing left foot pain. No new chills or cough. Prior (baseline) level of function: Independent. Current level of function:         Current  IRF-[facility]NA and Goals:   Occupational Therapy:    Short Term Goals  Time Frame for Short term goals: STGs=LTGs :   Long Term Goals  Time Frame for Long term goals : 10 days or until d/c. Long Term Goal 1: Pt will complete oral care and grooming tasks c Mod I.  Long Term Goal 2: Pt will complete total body bathing c AE PRN and Mod I.  Long Term Goal 3: Pt will complete UB dressing c IND. Long Term Goal 4: Pt will complete LB dressing c AE PRN and Mod I.  Long Term Goal 5: Pt will doff/don footwear to L foot c AE PRN and Mod I. Additional Goals?: Yes  Long Term Goal 6: Pt will complete toileting c Mod I.  Long Term Goal 7: Pt will perform functional transfers (bed, chair, toilet, shower) c DME PRN and Mod I.  Long Term Goal 8: Pt will perform therex/therax to facilitate an increase in strength/endurance (c emphasis on dynamic standing balance/tolerance > 8 mins) c Mod I.  Long Term Goal 9: Pt will complete home management tasks c Mod I. :                                       Eating: Eating  Assistance Needed: Independent  Comment: Pt able to open all packages/containers and feed self  CARE Score: 6  Discharge Goal: Independent       Oral Hygiene: Oral Hygiene  Assistance Needed: Independent  Comment:  Mod I using mouthwash to rinse seated in wc  CARE Score: 6  Discharge Goal: Independent    UB/LB Bathing: Shower/Bathe Self  Assistance Needed: Independent  Comment: Mod I seated entirety of shower; weight shifts to wash bottom  CARE Score: 6  Discharge Goal: Independent    UB Dressing: Upper Body Dressing  Assistance Needed: Independent  Comment: Pt able to retrieve clothing from WC; michelle/doff shirt  CARE Score: 6  Discharge Goal: Independent         LB Dressing: Lower Body Dressing  Assistance Needed: Independent  Comment: Pt able to retrieve clothing from [address]; mod I in stance to don over hips  CARE Score: 6  Discharge Goal: Independent    Donning and [place] Footwear: Putting On/Taking Off Footwear  Assistance Needed: Independent  Comment: Pt able to doff/don sock and shoe  CARE Score: 6  Discharge Goal: Independent      Toiletin [address] needed: Independent  Comment: Independent for clothing management and hygiene using grab bars for balance  CARE Score: 6  Discharge Goal: Independent      Toilet Transfers:   Toilet Transfer  Assistance needed: Independent  Comment: Independent using grab bars  CARE Score: 6  Discharge Goal: Independent    Physical Therapy:   Short Term Goals  Time Frame for Short term goals: 10-12 days STG=LTG  Short term goal 1: Pt will perform sit to stand, w/c<>bed and car transfers with mod I  Short term goal 2: Pt will ambulate 48' with 2ww onlevel surfaces with mod I, 10' on unlevel surface with supervision  Short term goal 3: Pt will ascend/descend 2\" step with 2ww with supervision  Short term goal 4: Pt will  light object with 2ww and reacher with CGA  Short term goal 5: Pt will perform w/c mobility in household setting with mod I            Bed Mobility:   Sit to Lying  Assistance Needed: Independent  CARE Score: 6  Discharge Goal: Independent  Roll Left and Right  Assistance Needed: Independent  Comment: no rails needed  CARE Score: 6  Discharge Goal: Independent  Lying to Sitting on Side of Bed  Assistance Needed: Independent  CARE Score: 6  Discharge Goal: Independent    Transfers:    Sit to Stand  Assistance Needed: Independent  Comment: to 2ww  CARE Score: 6  Chair/Bed-to-Chair Transfer  Assistance Needed: Independent  Comment: pivot or 2ww  CARE Score: 6  Discharge Goal: Independent  Toilet Transfer  Assistance needed: Independent  CARE Score: 6  Car Transfer  Assistance Needed: Independent  Comment: approach with w/c, independent with w/c management and transfer  CARE Score: 6  Discharge Goal: Independent    Ambulation:    Walking Ability  Does the Patient Walk?: Yes     Walk 10 Feet  Assistance Needed: Independent  Comment: 2ww  CARE Score: 6  Discharge Goal: Independent     Walk 50 Feet with Two Turns  Assistance Needed: Independent  Comment: 2ww  CARE Score: 6  Discharge Goal: Independent     Walk 150 Feet  Comment: distance contraindicated with L foot wounds  Reason if not Attempted: Not attempted due to medical condition or safety concerns  CARE Score: 88  Discharge Goal: Not Attempted     Walking 10 Feet on Uneven Surfaces  Assistance Needed: Supervision or touching assistance  Comment: CG with 2ww on cement sidewalk, pressed stone, carpet with bean bags  CARE Score: 4  Discharge Goal: Supervision or touching assistance     1 Step (Curb)  Assistance Needed: Partial/moderate assistance  Comment: min assist with 2ww  Reason if not Attempted: Not attempted due to medical condition or safety concerns  CARE Score: 3  Discharge Goal: Supervision or touching assistance     4 Steps  Comment: curb step only ; unsafe for performance and contraindicated 2/2 wound healing  Reason if not Attempted: Not attempted due to medical condition or safety concerns  CARE Score: 88  Discharge Goal: Not Attempted     12 Steps  Comment: curb step only ; unsafe for performance and contraindicated 2/2 wound healing  Reason if not Attempted: Not attempted due to medical condition or safety concerns  CARE Score: 88  Discharge Goal: Not Attempted       Wheelchair:  w/c Ability: Wheelchair Ability  Uses a Wheelchair and/or Scooter?: Yes  Wheel 50 Feet with Two Turns  Assistance Needed: Independent  Comment: ROBERT UE propulsion  CARE Score: 6  Discharge Goal: Independent  Wheel 150 Feet  Assistance Needed: Independent  Comment: B UE propulsion  CARE Score: 6  Discharge Goal: Independent          Balance:        Object: Picking Up Object  Assistance Needed: Supervision or touching assistance  Comment: supervision due to unsteadiness and needing to put hand back on walker often to catch balance  CARE Score: 4  Discharge Goal: Supervision or touching assistance    I      Exam:    Blood pressure (!) 152/93, pulse 73, temperature 98.6 °F (37 °C), temperature source Oral, resp. rate 18, height 6' (1.829 m), weight 173 lb 1 oz (78.5 kg), SpO2 96 %. General: Again up in wheelchair propelling it on the unit. Talkative. In good spirits. Fair insight. HEENT: Neck supple. Gazing right and left. MMM. Pulmonary: Symmetric air exchange without coughing. Cardiac: RRR. Abdomen: Patient's abdomen is soft and nondistended. Bowel sounds were present throughout. There was no rebound, guarding or masses noted. Upper extremities: No new bruising or signs of DVT. Lower extremities: Incision is well approximated and pink. Staples intact. Knee movements are painful on the right. Left calf is soft. Left foot wound is dressed with scant drainage. No significant erythema. Sitting balance was good. Standing balance was poor.     Lab Results   Component Value Date    WBC 11.6 (H) 09/08/2022    HGB 11.8 (L) 09/08/2022    HCT 36.9 (L) 09/08/2022    MCV 91.1 09/08/2022     (H) 09/08/2022     Lab Results   Component Value Date    INR 0.89 05/20/2022    INR 1.14 04/19/2022    INR 1.27 06/12/2021    PROTIME 11.5 (L) 05/20/2022    PROTIME 14.7 (H) 04/19/2022    PROTIME 15.4 (H) 06/12/2021     Lab Results   Component Value Date    CREATININE 1.7 (H) 09/12/2022    BUN 14 09/12/2022     09/12/2022    K 3.7 09/12/2022    CL 99 09/12/2022    CO2 30 09/12/2022     Lab Results   Component Value Date    ALT 15 09/04/2022    AST 24 09/04/2022    ALKPHOS 73 09/04/2022    BILITOT 0.3 09/04/2022       Expected length of stay  prior to a supervised level of function for discharge home with a wheelchair and [facility] OT/PT is 9/17/2022. Recommendations:    Necrotizing fasciitis of the right lower limb with amputation: We will transition to a home-based plan for nursing care and therapies after discharge from rehab tomorrow. He is modified independent with a wheelchair for personal care and mobility. He will follow-up with his surgeon in the wound care center. His left leg and knee control is too poor to do significant hopping yet. No fever while off antibiotics. Surgical follow-up in the office in about 2 weeks. He benefits from aggressive pulmonary management, wound care and DVT prophylaxis. Continuing to practice techniques for self-care activities and mobility with no weightbearing through the right lower limb. Caregiver training scheduled before discharge. Local wound care, nutritional support and wound care of the left foot. Outpatient follow-up with his surgeon and PCP. Modified independent for transfers. DVT prophylaxis: Lovenox 40 mg subcu daily. I must monitor his hemoglobin and platelet count while on this medication. Weightbearing activities are limited but are tried through the left foot daily. GI prophylaxis is offered. No clinical signs of blood loss. Anticipate stopping the Lovenox at discharge. Uncontrolled pain: His needs are being met with oral oxycodone as needed with the scheduled acetaminophen. Bowel intervention has been successful. Limb elevation and progressive mobilization. Necrotizing fasciitis: IV antibiotics were tolerated well and have been discontinued.   Monitoring him for signs of further soft tissue compromise. Monitoring his fever curve and for any leukocytosis. Uncontrolled diabetes type 2 with peripheral neuropathy: Patient requires a diet modified for carbohydrates. He is on scheduled Lantus and Humalog sliding scale. Blood sugars are checked at mealtime and bedtime. Hypertension: Cozaar and Toprol for blood pressure regulation. Vital signs are checked at rest and with activity. Target systolic blood pressures [id number]. His blood pressure is above the target range again today. Pneumatosis coli: He has completed a full course of antibiotics. Outpatient follow-up with gastroenterology with reimaging in the near future. Monitoring him for GI symptoms. Left foot diabetic ulcer: Wound care team is directing the management of the left foot's local wound care. No change to the slight drainage and tenderness. He is tolerating the weightbearing through the left heel and plantar surface for now. Depression with anxiety: Sleep regulation, pain management and treatment in a calm and consistent environment. Caregiver training and involved in the planning for discharge.

## 2022-09-17 NOTE — CARE COORDINATION
[facility] refusing to provide discharge transport. Case mgt scheduled dc transport with [facility]. Pickup @1500. Confirmed they're able to transport his clothes and bag of food. Fabio Smith RN updated.

## 2022-09-19 ENCOUNTER — CARE COORDINATION (OUTPATIENT)
Dept: CARE COORDINATION | Age: 42
End: 2022-09-19

## 2022-09-19 NOTE — DISCHARGE SUMMARY
Patient Name: Madeleine Wesley  Patient :  1980  Patient MRN:   [id number]      Admission Date:  2022  Discharge Date: 2022    Admitting diagnosis: Necrotizing fasciitis right lower limb ( [address] [place] [address])     Comorbid diagnoses impacting rehabilitation: Uncontrolled pain, generalized weakness, gait disturbance, acute blood loss anemia, right below-knee amputation, uncontrolled diabetes type 2 with peripheral neuropathy, essential hypertension, pneumatosis coli, left foot diabetic ulcer, depression with anxiety    Discharging diagnosis: Necrotizing fasciitis right lower limb ( [address] [address])     Comorbid diagnoses impacting rehabilitation: Uncontrolled pain, generalized weakness, gait disturbance, acute blood loss anemia, right below-knee amputation, uncontrolled diabetes type 2 with peripheral neuropathy, essential hypertension, pneumatosis coli, left foot diabetic ulcer, depression with anxiety     History of present illness: The patient is a 75-year-old right-hand-dominant male with chronic manifestations of diabetes and hypertension with dysvascular wounds of his legs. 3 months ago he had an amputation of the left great toe due to uncontrolled infection. He was in and out of the hospital twice in the last days of August of this year with nausea and GI complaints. He was diagnosed with pneumatosis coli and discharged home on oral antibiotics on 2022. Within 2 hours he was back in our ED with increased drainage, pain and erythema in the right lower limb. He was diagnosed with necrotizing fasciitis and had an urgent guillotine amputation of the right lower limb below the knee by Dr. Fredrick Rodríguez. She placed a wound VAC to on the stump at that time. On 2022 Dr. Nabil New performed a primary closure of his right BKA stump. The patient's blood pressure were fluctuating significantly and he was diagnosed with sepsis associated with the necrotizing fasciitis and pneumatosis coli.   His blood sugars continued to fluctuating significantly and his pain was poorly controlled. Prior (baseline) level of function: Independent. Current level of function:         Current  IRF-DAYNA and Goals:   Occupational Therapy:    Short Term Goals  Time Frame for Short term goals: STGs=LTGs :   Long Term Goals  Time Frame for Long term goals : 10 days or until d/c. Long Term Goal 1: Pt will complete oral care and grooming tasks c Mod I.  Long Term Goal 2: Pt will complete total body bathing c AE PRN and Mod I.  Long Term Goal 3: Pt will complete UB dressing c IND. Long Term Goal 4: Pt will complete LB dressing c AE PRN and Mod I.  Long Term Goal 5: Pt will doff/don footwear to L foot c AE PRN and Mod I. Additional Goals?: Yes  Long Term Goal 6: Pt will complete toileting c Mod I.  Long Term Goal 7: Pt will perform functional transfers (bed, chair, toilet, shower) c DME PRN and Mod I.  Long Term Goal 8: Pt will perform therex/therax to facilitate an increase in strength/endurance (c emphasis on dynamic standing balance/tolerance > 8 mins) c Mod I.  Long Term Goal 9: Pt will complete home management tasks c Mod I. :                                       Eating: Eating  Assistance Needed: Independent  Comment: Pt able to open all packages/containers and feed self  CARE Score: 6  Discharge Goal: Independent       Oral Hygiene: Oral Hygiene  Assistance Needed: Independent  Comment: Mod I using mouthwash to rinse seated in wc  CARE Score: 6  Discharge Goal: Independent    UB/LB Bathing: Shower/Bathe Self  Assistance Needed: Independent  Comment:  Mod I seated entirety of shower; weight shifts to wash bottom  CARE Score: 6  Discharge Goal: Independent    UB Dressing: Upper Body Dressing  Assistance Needed: Independent  Comment: Pt able to retrieve clothing from [facility]; michelle/doff shirt  CARE Score: 6  Discharge Goal: Independent         LB Dressing: Lower Body Dressing  Assistance Needed: Independent  Comment: Pt able to retrieve clothing from [facility]; mod I in stance to don over hips  CARE Score: 6  Discharge Goal: Independent    Donning and [place] Footwear: Putting On/Taking Off Footwear  Assistance Needed: Independent  Comment: Pt able to doff/don sock and shoe  CARE Score: 6  Discharge Goal: Independent      Toiletin [address] needed: Independent  Comment: Independent for clothing management and hygiene using grab bars for balance  CARE Score: 6  Discharge Goal: Independent      Toilet Transfers:   Toilet Transfer  Assistance needed: Independent  Comment: Independent using grab bars  CARE Score: 6  Discharge Goal: Independent    Physical Therapy:   Short Term Goals  Time Frame for Short term goals: 10-12 days STG=LTG  Short term goal 1: Pt will perform sit to stand, w/c<>bed and car transfers with mod I  Short term goal 2: Pt will ambulate 48' with 2ww onlevel surfaces with mod I, 10' on unlevel surface with supervision  Short term goal 3: Pt will ascend/descend 2\" step with 2ww with supervision  Short term goal 4: Pt will  light object with 2ww and reacher with CGA  Short term goal 5: Pt will perform w/c mobility in household setting with mod I            Bed Mobility:   Sit to Lying  Assistance Needed: Independent  CARE Score: 6  Discharge Goal: Independent  Roll Left and Right  Assistance Needed: Independent  Comment: no rails needed  CARE Score: 6  Discharge Goal: Independent  Lying to Sitting on Side of Bed  Assistance Needed: Independent  CARE Score: 6  Discharge Goal: Independent    Transfers:    Sit to Stand  Assistance Needed: Independent  Comment: to 2ww  CARE Score: 6  Chair/Bed-to-Chair Transfer  Assistance Needed: Independent  Comment: pivot or 2ww  CARE Score: 6  Discharge Goal: Independent  Toilet Transfer  Assistance needed: Independent  CARE Score: 6  Car Transfer  Assistance Needed: Independent  Comment: approach with w/c, independent with w/c management and transfer  CARE Score: 6  Discharge Goal: Independent    Ambulation:    Walking Ability  Does the Patient Walk?: Yes     Walk 10 Feet  Assistance Needed: Independent  Comment: 2ww  CARE Score: 6  Discharge Goal: Independent     Walk 50 Feet with Two Turns  Assistance Needed: Independent  Comment: 2ww  CARE Score: 6  Discharge Goal: Independent     Walk 150 Feet  Comment: distance contraindicated with L foot wounds  Reason if not Attempted: Not attempted due to medical condition or safety concerns  CARE Score: 88  Discharge Goal: Not Attempted     Walking 10 Feet on Uneven Surfaces  Assistance Needed: Supervision or touching assistance  Comment: CG with 2ww on cement sidewalk, pressed stone, carpet with bean bags  CARE Score: 4  Discharge Goal: Supervision or touching assistance     1 Step (Curb)  Assistance Needed: Partial/moderate assistance  Comment: min assist with 2ww  Reason if not Attempted: Not attempted due to medical condition or safety concerns  CARE Score: 3  Discharge Goal: Supervision or touching assistance     4 Steps  Comment: curb step only ; unsafe for performance and contraindicated 2/2 wound healing  Reason if not Attempted: Not attempted due to medical condition or safety concerns  CARE Score: 88  Discharge Goal: Not Attempted     12 Steps  Comment: curb step only ; unsafe for performance and contraindicated 2/2 wound healing  Reason if not Attempted: Not attempted due to medical condition or safety concerns  CARE Score: 88  Discharge Goal: Not Attempted       Wheelchair:  w/c Ability: Wheelchair Ability  Uses a Wheelchair and/or Scooter?: Yes  Wheel 50 Feet with Two Turns  Assistance Needed: Independent  Comment: B UE propulsion  CARE Score: 6  Discharge Goal: Independent  Wheel 150 Feet  Assistance Needed: Independent  Comment: B UE propulsion  CARE Score: 6  Discharge Goal: Independent          Balance:        Object: Picking Up Object  Assistance Needed: Supervision or touching assistance  Comment: supervision due to unsteadiness and needing to put hand back on walker often to catch balance  CARE Score: 4  Discharge Goal: Supervision or touching assistance    I      Exam:    Blood pressure (!) 153/103, pulse 78, temperature 98.7 °F (37.1 °C), temperature source Oral, resp. rate 16, height 6' (1.829 m), weight 173 lb 1 oz (78.5 kg), SpO2 97 %. General: Semirecumbent in bed. Alert and talkative. Oriented x3. In good spirits. Fair insight. HEENT: Clear speech. Neck supple. Gazing right and left. MMM. Pulmonary: Unlabored respirations without wheezes or rales. .    Cardiac: Regular rate and rhythm. Abdomen: Patient's abdomen is soft and nondistended. Bowel sounds were present throughout. There was no rebound, guarding or masses noted. Upper extremities: He was able to bring both hands up overhead. No tremor. No new bruising or signs of DVT. Lower extremities: Incision is well approximated and pink. Staples intact. Knee movements are painful on the right. Left calf is soft. Left foot wound is dressed with scant drainage. No significant erythema. Sitting balance was good. Standing balance was poor.     Lab Results   Component Value Date    WBC 11.6 (H) 09/08/2022    HGB 11.8 (L) 09/08/2022    HCT 36.9 (L) 09/08/2022    MCV 91.1 09/08/2022     (H) 09/08/2022     Lab Results   Component Value Date    INR 0.89 05/20/2022    INR 1.14 04/19/2022    INR 1.27 06/12/2021    PROTIME 11.5 (L) 05/20/2022    PROTIME 14.7 (H) 04/19/2022    PROTIME 15.4 (H) 06/12/2021     Lab Results   Component Value Date    CREATININE 1.7 (H) 09/12/2022    BUN 14 09/12/2022     09/12/2022    K 3.7 09/12/2022    CL 99 09/12/2022    CO2 30 09/12/2022     Lab Results   Component Value Date    ALT 15 09/04/2022    AST 24 09/04/2022    ALKPHOS 73 09/04/2022    BILITOT 0.3 09/04/2022         The patient presented to the ARU with the above history requiring a multidisciplinary treatment plan including close medical supervision by the [facility] Director. The patient participated in the prescribed therapy treatment plan with reasonable compliance and progressive tolerance. They avoided significant medical complications. By the time of discharge the patient had become safer with adaptive equipment to transfer and toilet with a wheelchair with the supervision of family/caregivers. The patient was tolerating an oral diet without choking/coughing and was back to their baseline with regards to bowel and bladder control. Discharge instructions were reviewed with the patient. The patient is to follow up with the surgeon and his PCP in 1 week. No driving. [facility] PT/OT/RN will be arranged. Medication List        START taking these medications      docusate 100 MG Caps  Commonly known as: COLACE, DULCOLAX  Take 100 mg by mouth daily     oxyCODONE 5 MG immediate release tablet  Commonly known as: ROXICODONE  Take 1 tablet by mouth every 6 hours as needed for Pain for up to 7 days. CONTINUE taking these medications      Alcohol Swabs Pads     aspirin 81 MG chewable tablet     atorvastatin 40 MG tablet  Commonly known as: LIPITOR  Take 1 tablet by mouth nightly     blood glucose monitor kit and supplies  Dispense sufficient amount for indicated testing frequency plus additional to accommodate PRN testing needs. Dispense all needed supplies to include: monitor, strips, lancing device, lancets, control solutions, alcohol swabs. blood glucose test strips  Test 2 times a day & as needed for symptoms of irregular blood glucose. Dispense sufficient amount for indicated testing frequency plus additional to accommodate PRN testing needs. Dulaglutide 1.5 MG/0.5ML Sopn  Inject 1.5 mg into the skin once a week     FLUoxetine 40 MG capsule  Commonly known as: PROzac  Take 2 capsules by mouth in the morning.      FreeStyle Lancets Misc  1 each by Does not apply route daily     glyBURIDE 5 MG tablet  Commonly known as: DIABETA  Take 2 tablets by mouth in the morning and at bedtime     Lantus SoloStar 100 UNIT/ML injection pen  Generic drug: insulin glargine  Inject 30 Units into the skin nightly     losartan 25 MG tablet  Commonly known as: COZAAR  Take 1 tablet by mouth in the morning. metoprolol succinate 25 MG extended release tablet  Commonly known as: TOPROL XL  Take 1 tablet by mouth in the morning. pantoprazole 20 MG tablet  Commonly known as: PROTONIX  Take 1 tablet by mouth every morning (before breakfast)     sucralfate 1 GM/10ML suspension  Commonly known as: Carafate  Take 10 mLs by mouth 4 times daily for 7 days            STOP taking these medications      acetaminophen 325 MG tablet  Commonly known as: TYLENOL     collagenase 250 UNIT/GM ointment     metFORMIN 500 MG tablet  Commonly known as: GLUCOPHAGE     metoclopramide 10 MG tablet  Commonly known as: REGLAN     metroNIDAZOLE 500 MG tablet  Commonly known as: FLAGYL     omeprazole 20 MG delayed release capsule  Commonly known as: PRILOSEC     ondansetron 4 MG disintegrating tablet  Commonly known as: ZOFRAN-ODT     pioglitazone 30 MG tablet  Commonly known as: Actos               Where to Get Your Medications        You can get these medications from any pharmacy    Bring a paper prescription for each of these medications  oxyCODONE 5 MG immediate release tablet  You don't need a prescription for these medications  docusate 100 MG Caps         CONDITION ON DISCHARGE: Stable. The prognosis is fair for further improvements in ADL's and safety with adapted gait/transfers. Record review, patient exam, discharge instructions, medication reconciliation and summary for this discharge visit took more than 30 minutes.

## 2022-09-19 NOTE — CARE COORDINATION
SW missed call from Pt who left message stating his insurance is going to drop him at the end of the month, requesting a return call. Phone call to Pt. Pt reported he received a letter that he makes too much money from disability and is no longer eligible for medicaid. KOLE advised Pt to contact Daniela to discuss options. Pt reported he would like to move forward and explore options. KOLE offered to merge call to [facility] and provided him with the phone number as well. KOLE merged call to [facility]. Pt did appear to become frustrated with the process and hung up. Pt called this KOLE back. Discussed options of calling Daniela or calling [facility] for assistance. Pt stated he was going to go. OKLE discussed plans to call Pt on 9/20 unless he wanted to call this KOLE back this afternoon. KOLE plan of care:  KOLE will assist Pt in identifying insurance provider and will call him on 9/20 to follow up.

## 2022-09-20 ENCOUNTER — CARE COORDINATION (OUTPATIENT)
Dept: CARE COORDINATION | Age: 42
End: 2022-09-20

## 2022-09-20 ENCOUNTER — HOSPITAL ENCOUNTER (OUTPATIENT)
Dept: WOUND CARE | Age: 42
Discharge: HOME | End: 2022-09-20
Payer: COMMERCIAL

## 2022-09-20 VITALS
RESPIRATION RATE: 16 BRPM | HEART RATE: 87 BPM | SYSTOLIC BLOOD PRESSURE: 144 MMHG | DIASTOLIC BLOOD PRESSURE: 90 MMHG | TEMPERATURE: 97.2 F

## 2022-09-20 DIAGNOSIS — E11.621 DIABETIC ULCER OF LEFT MIDFOOT ASSOCIATED WITH TYPE 2 DIABETES MELLITUS, WITH FAT LAYER EXPOSED (HCC): Primary | ICD-10-CM

## 2022-09-20 DIAGNOSIS — S88.119A BELOW-KNEE AMPUTATION (HCC): ICD-10-CM

## 2022-09-20 DIAGNOSIS — L97.422 DIABETIC ULCER OF LEFT MIDFOOT ASSOCIATED WITH TYPE 2 DIABETES MELLITUS, WITH FAT LAYER EXPOSED (HCC): Primary | ICD-10-CM

## 2022-09-20 DIAGNOSIS — E11.621 DIABETIC ULCER OF TOE OF LEFT FOOT ASSOCIATED WITH TYPE 2 DIABETES MELLITUS, WITH FAT LAYER EXPOSED (HCC): ICD-10-CM

## 2022-09-20 DIAGNOSIS — Z89.432 PARTIAL NONTRAUMATIC AMPUTATION OF FOOT, LEFT (HCC): ICD-10-CM

## 2022-09-20 DIAGNOSIS — Z47.81 ENCOUNTER FOR ORTHOPEDIC AFTERCARE FOLLOWING SURGICAL AMPUTATION: ICD-10-CM

## 2022-09-20 DIAGNOSIS — L97.522 DIABETIC ULCER OF TOE OF LEFT FOOT ASSOCIATED WITH TYPE 2 DIABETES MELLITUS, WITH FAT LAYER EXPOSED (HCC): ICD-10-CM

## 2022-09-20 DIAGNOSIS — L03.116 CELLULITIS OF LEFT FOOT: ICD-10-CM

## 2022-09-20 PROCEDURE — 11042 DBRDMT SUBQ TIS 1ST 20SQCM/<: CPT | Performed by: NURSE PRACTITIONER

## 2022-09-20 PROCEDURE — 11042 DBRDMT SUBQ TIS 1ST 20SQCM/<: CPT

## 2022-09-20 RX ORDER — LIDOCAINE HYDROCHLORIDE 40 MG/ML
SOLUTION TOPICAL ONCE
Status: CANCELLED | OUTPATIENT
Start: 2022-09-20 | End: 2022-09-20

## 2022-09-20 RX ORDER — GINSENG 100 MG
CAPSULE ORAL ONCE
Status: CANCELLED | OUTPATIENT
Start: 2022-09-20 | End: 2022-09-20

## 2022-09-20 RX ORDER — GENTAMICIN SULFATE 1 MG/G
OINTMENT TOPICAL ONCE
Status: CANCELLED | OUTPATIENT
Start: 2022-09-20 | End: 2022-09-20

## 2022-09-20 RX ORDER — LIDOCAINE 40 MG/G
CREAM TOPICAL ONCE
Status: CANCELLED | OUTPATIENT
Start: 2022-09-20 | End: 2022-09-20

## 2022-09-20 RX ORDER — LIDOCAINE 50 MG/G
OINTMENT TOPICAL ONCE
Status: CANCELLED | OUTPATIENT
Start: 2022-09-20 | End: 2022-09-20

## 2022-09-20 RX ORDER — BACITRACIN, NEOMYCIN, POLYMYXIN B 400; 3.5; 5 [USP'U]/G; MG/G; [USP'U]/G
OINTMENT TOPICAL ONCE
Status: CANCELLED | OUTPATIENT
Start: 2022-09-20 | End: 2022-09-20

## 2022-09-20 RX ORDER — CLOBETASOL PROPIONATE 0.5 MG/G
OINTMENT TOPICAL ONCE
Status: CANCELLED | OUTPATIENT
Start: 2022-09-20 | End: 2022-09-20

## 2022-09-20 RX ORDER — LIDOCAINE HYDROCHLORIDE 20 MG/ML
JELLY TOPICAL ONCE
Status: CANCELLED | OUTPATIENT
Start: 2022-09-20 | End: 2022-09-20

## 2022-09-20 RX ORDER — BETAMETHASONE DIPROPIONATE 0.05 %
OINTMENT (GRAM) TOPICAL ONCE
Status: CANCELLED | OUTPATIENT
Start: 2022-09-20 | End: 2022-09-20

## 2022-09-20 RX ORDER — BACITRACIN ZINC AND POLYMYXIN B SULFATE 500; 1000 [USP'U]/G; [USP'U]/G
OINTMENT TOPICAL ONCE
Status: CANCELLED | OUTPATIENT
Start: 2022-09-20 | End: 2022-09-20

## 2022-09-20 ASSESSMENT — PAIN DESCRIPTION - FREQUENCY: FREQUENCY: CONTINUOUS

## 2022-09-20 ASSESSMENT — PAIN - FUNCTIONAL ASSESSMENT: PAIN_FUNCTIONAL_ASSESSMENT: PREVENTS OR INTERFERES SOME ACTIVE ACTIVITIES AND ADLS

## 2022-09-20 ASSESSMENT — PAIN DESCRIPTION - LOCATION: LOCATION: LEG

## 2022-09-20 ASSESSMENT — PAIN DESCRIPTION - ORIENTATION: ORIENTATION: RIGHT

## 2022-09-20 ASSESSMENT — PAIN DESCRIPTION - PAIN TYPE: TYPE: SURGICAL PAIN

## 2022-09-20 ASSESSMENT — PAIN DESCRIPTION - DESCRIPTORS: DESCRIPTORS: THROBBING;STABBING

## 2022-09-20 ASSESSMENT — PAIN DESCRIPTION - ONSET: ONSET: ON-GOING

## 2022-09-20 NOTE — PROGRESS NOTES
Wound Care Center Progress Note With Procedure    Sherrell Ramirez  AGE: 39 y.o. GENDER: male  : 1980  EPISODE DATE:  2022     Subjective:     Chief Complaint   Patient presents with    Wound Check     ble         HISTORY of PRESENT ILLNESS      Sherrell Ramirez is a 39 y.o. male who presents today for wound evaluation of Chronic diabetic, pressure and non-healing surgical ulcer(s) of the left medial foot and lateral plantar surface. The ulcer is of marked severity. The underlying cause of the wound is diabetes, non-healing surgical. He presents today with a new wound to the right plantar foot that is diabetic and pressure in nature and of moderate severity. The patient has significant underlying medical conditions as below. The patient is having significant depression related to the recent and sudden death of his mother. 22: Since his last visit to the wound clinic 22 the patient was hospitalized -22) at [facility] with necrotizing fascitis right lower leg with resulting right BKA. 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 Juanito Alejo. She placed a wound VAC to on the stump at that time. On 2022 Dr. Dennis Mathew performed a primary closure of his right BKA stump. Wound Pain Timing/Severity: none  Quality of pain: N/A  Severity of pain:  0 / 10   Modifying Factors: diabetes and chronic pressure  Associated Signs/Symptoms: drainage     Diabetes: Yes, on an oral and insulin regimen, last A1c 10.5 as of 22  Diabetes education provided today:    Diabetes pathoetiology, difference between type 1 and type 2 diabetes, and progressive nature of Type 2 DM. Diabetic Neuropathy: signs and therapy. Foot care: advised to wash feet daily, pat dry and apply lotion at night, avoiding between toes. Need to look at feet daily and report to a physician any signs of inflammation or skin damage. Discussed diabetes shoes and socks.   Diabetic management related to wound care    Smoking: Never smoker  Obesity:No  Anticoagulant therapy: No  Immunosuppression: No    Patient educated on the 6 essential components necessary for wound healing: Circulation, Debridements, Proper Dressings and Topical Wound Products, Infection Control, Edema Control and Offloading. Patient educated on those factors that negatively effect or impact wound healing: smoking, obesity, uncontrolled diabetes, anticoagulant and immunosuppressive regimens, inadequate nutrition, untreated arterial and venous disease if applicable and measures to manage edema. Nutritional status: well nourished. Discussed need for increased protein and calories for wound healing and good sources of protein (just over 7 grams for every 20 pounds of body weight). Animal-based foods high in protein (meat, poultry, fish, eggs, and dairy foods). Plant based foods high in protein (tofu, lentils, beans, chickpeas, nuts, quinoa and den seeds. Off Loading  Offloading or minimizing or removing weight placed on an area with poor circulation such as diabetic wounds or pressure. This can be achieved with crutches, wheel chair, knee walker etc. Minimizing pressure through partial weight bearing (minimizing the amount of  pressure applied and or the amount of time on the area of pressure) or maintaining a non-weight bearing status can be used to promote and often can be essential for thee wound to heal. Off loading may also need to be achieved for non-weight bearing wounds such as pressure ulcers to the torso. Turning and changing positions frequently, at least every two hours. Use of pressure cushion if sitting up in chair. Skin Care  Keep skin clean and well moisturized , moisturize routinely with ointments for heavier moisturizer needs for extremely dry skin or cracks such as A&D ointment and lotions for a light moisturizer such as CeraVe or Eucerin.  If incontinent change incontinence garments as soon as soiled and keeping skin clean and use barrier cream to protect the skin.         PAST MEDICAL HISTORY        Diagnosis Date    Abscess of left foot 4/22/2022    Anemia associated with acute blood loss 4/26/2022    Callus of foot     Right foot - see's Dr. Juan Rosales    Cellulitis of left foot 4/22/2022    Diabetic ulcer of left midfoot associated with type 2 diabetes mellitus, with muscle involvement without evidence of necrosis ([address] 4/26/2022    Diabetic ulcer of left midfoot associated with type 2 diabetes mellitus, with necrosis of muscle ([address] 6/7/2021    Diabetic ulcer of right midfoot associated with type 2 diabetes mellitus, with fat layer exposed ([address] 8/11/2020    Dizziness     positional    Essential hypertension     Follows with PCP    Hyperlipidemia     Lumbar radiculopathy     Septic embolism ([address] 6/13/2020    Subacute osteomyelitis of left foot ([address] 4/22/2022       PAST SURGICAL HISTORY    Past Surgical History:   Procedure Laterality Date    ACHILLES TENDON SURGERY Right 1/8/2021    RIGHT ACHILLES TENDON LENGTHENING REPAIR performed by Karolyn Morris DPM at [address]  03/2018    [facility]    DENTAL SURGERY      teeth extractions -half per patient    HERNIA REPAIR Bilateral 5/27/2020    BIALTERAL HERNIA INGUINAL REPAIR performed by Ramiro Blue MD at [address] Right 9/2/2022    LEG AMPUTATION BELOW KNEE performed by Ermelinda Mccall MD at [address] Right 9/5/2022    LEG AMPUTATION BELOW KNEE REVISION performed by Ermelinda Mccall MD at [facility] 30 2018    MD OFFICE/OUTPT VISIT,PROCEDURE ONLY Left 4/17/2018    L5-S1 HEMILAMINECTOMY, REMOVAL OF DISC LEFT SIDE performed by Meseret Gurrola MD at [address] Right 1/8/2021    RIGHT TRANSMETATARSAL TOE [facility]UTATION [facility] Left 4/23/2022    LEFT RAY GREAT TOE AMPUTATION performed by Chen Sol MD at [address]    Family History   Problem Relation Age of Onset    Heart Disease Father     Diabetes Father     Diabetes Mother     Heart Disease Mother     Other Mother     Kidney Disease Mother     Heart Attack Mother        SOCIAL HISTORY    Social History     Tobacco Use    Smoking status: Never    Smokeless tobacco: Never   Vaping Use    Vaping Use: Never used   Substance Use Topics    Alcohol use: Yes     Comment: \"couple beers a night\"    Drug use: No       ALLERGIES    No Known Allergies    MEDICATIONS    Current Outpatient Medications on File Prior to Encounter   Medication Sig Dispense Refill    oxyCODONE (ROXICODONE) 5 MG immediate release tablet Take 1 tablet by mouth every 6 hours as needed for Pain for up to 7 days. 28 tablet 0    docusate sodium (COLACE, DULCOLAX) 100 MG CAPS Take 100 mg by mouth daily      aspirin 81 MG chewable tablet Take 81 mg by mouth daily      pantoprazole (PROTONIX) 20 MG tablet Take 1 tablet by mouth every morning (before breakfast) 30 tablet 0    [DISCONTINUED] metFORMIN (GLUCOPHAGE) 500 MG tablet Take 2 tablets by mouth 2 times daily (with meals) Indications: Start tomorrow 03/14 360 tablet 1    [DISCONTINUED] metoclopramide (REGLAN) 10 MG tablet Take 1 tablet by mouth 4 times daily (before meals and nightly) 120 tablet 3    sucralfate (CARAFATE) 1 GM/10ML suspension Take 10 mLs by mouth 4 times daily for 7 days 414 mL 0    glyBURIDE (DIABETA) 5 MG tablet Take 2 tablets by mouth in the morning and at bedtime 120 tablet 1    Dulaglutide 1.5 MG/0.5ML SOPN Inject 1.5 mg into the skin once a week 5 pen 3    metoprolol succinate (TOPROL XL) 25 MG extended release tablet Take 1 tablet by mouth in the morning. 90 tablet 1    losartan (COZAAR) 25 MG tablet Take 1 tablet by mouth in the morning.  90 tablet 1    [DISCONTINUED] omeprazole (PRILOSEC) 20 MG delayed release capsule Take 1 capsule by mouth once daily in the morning 90 capsule 1    FLUoxetine (PROZAC) 40 MG capsule Take 2 capsules by mouth in the morning. 30 capsule 3    insulin glargine (LANTUS SOLOSTAR) 100 UNIT/ML injection pen Inject 30 Units into the skin nightly 5 pen 2    Alcohol Swabs PADS       atorvastatin (LIPITOR) 40 MG tablet Take 1 tablet by mouth nightly 90 tablet 1    blood glucose monitor strips Test 2 times a day & as needed for symptoms of irregular blood glucose. Dispense sufficient amount for indicated testing frequency plus additional to accommodate PRN testing needs. 100 strip 0    [DISCONTINUED] pioglitazone (ACTOS) 30 MG tablet Take 1 tablet by mouth daily 90 tablet 1    blood glucose monitor kit and supplies Dispense sufficient amount for indicated testing frequency plus additional to accommodate PRN testing needs. Dispense all needed supplies to include: monitor, strips, lancing device, lancets, control solutions, alcohol swabs. 1 kit 0    FreeStyle Lancets MISC 1 each by Does not apply route daily 100 each 3    [DISCONTINUED] acetaminophen (TYLENOL) 325 MG tablet Take 2 tablets by mouth every 4 hours as needed for Pain or Fever 120 tablet 3     No current facility-administered medications on file prior to encounter. REVIEW OF SYSTEMS    Pertinent items are noted in HPI. Constitutional: Negative for systemic symptoms including fever, chills and malaise. Objective:      BP (!) 144/90   Pulse 87   Temp 97.2 °F (36.2 °C) (Temporal)   Resp 16     PHYSICAL EXAM      General: The patient is in no acute distress. Mental status:  Patient is appropriate, is  oriented to place and plan of care.   Dermatologic exam: Visual inspection of the periwound reveals the skin to be normal in turgor and texture  Wound exam: see wound description below in procedure note      Assessment:     Problem List Items Addressed This Visit          Endocrine    WD-Diabetic ulcer of left midfoot associated with type 2 diabetes mellitus, with fat layer exposed ([address] - Primary    Relevant Orders    Initiate Outpatient Wound Care Protocol    Diabetic ulcer of toe of left foot associated with type 2 diabetes mellitus, with fat layer exposed ([address]    Relevant Orders    Initiate Outpatient Wound Care Protocol       Other    Cellulitis of left foot    Relevant Orders    Initiate Outpatient Wound Care Protocol    WD-Partial nontraumatic amputation of foot, left [facility])    Relevant Orders    Initiate Outpatient Wound Care Protocol    WD-Below-knee amputation [facility])    WD-Encounter for orthopedic aftercare following surgical amputation       Procedure Note    Indications:  Based on my examination of this patient's wound(s) today, sharp excision into necrotic subcutaneous tissue is required to promote healing and evaluate the extent of previous healing. Performed by: RAFAEL Rodriguez - CNP    Consent obtained: Yes    Time out taken:  Yes    Pain Control: Anesthetic  Anesthetic: 4% Lidocaine Liquid Topical      Debridement:Excisional Debridement    Using curette and tissue nippers the wound(s) was/were sharply debrided down through and including the removal of subcutaneous tissue.         Devitalized Tissue Debrided:  slough, exudate and callus    Pre Debridement Measurements:  Are located in the Wound Documentation Flow Sheet    All active wounds listed below with today's date are evaluated  Wound(s)    debrided this date include # : 1, 2     Post  Debridement Measurements:  Wound 05/11/22 #1 (onset 2 weeks) Left Medial Foot Amp Site (Active)   Wound Image   09/20/22 0937   Wound Etiology Non-Healing Surgical 09/20/22 0937   Dressing Status New dressing applied 09/20/22 1045   Wound Cleansed Soap and water 09/20/22 0937   Dressing/Treatment ABD;Collagen with Ag 09/17/22 1251   Offloading for Diabetic Foot Ulcers Diabetic shoes/inserts 09/20/22 1045   Dressing Change Due 09/16/22 09/17/22 1251   Wound Length (cm) 4 cm 09/20/22 0937   Wound Width (cm) 1 cm 09/20/22 6685   Wound Depth (cm) 0.2 cm 09/20/22 0937   Wound Surface Area (cm^2) 4 cm^2 09/20/22 0937   Change in Wound Size % (l*w) 92.23 09/20/22 0937   Wound Volume (cm^3) 0.8 cm^3 09/20/22 0937   Wound Healing % 97 09/20/22 0937   Post-Procedure Length (cm) 4 cm 09/20/22 0954   Post-Procedure Width (cm) 1 cm 09/20/22 0954   Post-Procedure Depth (cm) 0.2 cm 09/20/22 0954   Post-Procedure Surface Area (cm^2) 4 cm^2 09/20/22 0954   Post-Procedure Volume (cm^3) 0.8 cm^3 09/20/22 0954   Distance Tunneling (cm) 0 cm 09/20/22 0937   Tunneling Position ___ O'Clock 0 09/20/22 0937   Undermining Starts ___ O'Clock 0 09/20/22 0937   Undermining Ends___ O'Clock 0 09/20/22 0937   Undermining Maxium Distance (cm) 0 09/20/22 0937   Wound Assessment Devitalized tissue;Pink/red;Slough 09/20/22 0937   Drainage Amount Scant 09/20/22 0937   Drainage Description Serous 09/20/22 0937   Odor None 09/20/22 0937   Rosalie-wound Assessment Hyperkeratosis (callous) 09/20/22 0937   Margins Defined edges 09/20/22 0937   Wound Thickness Description not for Pressure Injury Full thickness 09/20/22 0937   Number of days: 132       Wound 05/11/22 #2 (onset unknown) Left Plantar (Active)   Wound Image   09/20/22 0937   Wound Etiology Diabetic 09/20/22 0937   Dressing Status New dressing applied 09/20/22 1045   Wound Cleansed Soap and water 09/20/22 0937   Dressing/Treatment ABD;Collagen with Ag 09/17/22 1251   Offloading for Diabetic Foot Ulcers Diabetic shoes/inserts 09/20/22 1045   Dressing Change Due 09/04/22 09/17/22 1251   Wound Length (cm) 1.7 cm 09/20/22 0937   Wound Width (cm) 1.5 cm 09/20/22 0937   Wound Depth (cm) 0.4 cm 09/20/22 0937   Wound Surface Area (cm^2) 2.55 cm^2 09/20/22 0937   Change in Wound Size % (l*w) -59.38 09/20/22 0937   Wound Volume (cm^3) 1.02 cm^3 09/20/22 0937   Wound Healing % -219 09/20/22 0937   Post-Procedure Length (cm) 1.7 cm 09/20/22 0954   Post-Procedure Width (cm) 1.5 cm 09/20/22 0954   Post-Procedure Depth (cm) 0.4 cm 09/20/22 0954   Post-Procedure Surface Area (cm^2) 2.55 cm^2 09/20/22 0954   Post-Procedure Volume (cm^3) 1.02 cm^3 09/20/22 0954   Distance Tunneling (cm) 0 cm 09/20/22 0937   Tunneling Position ___ O'Clock 0 09/20/22 0937   Undermining Starts ___ O'Clock 0 09/20/22 0937   Undermining Ends___ O'Clock 0 09/20/22 0937   Undermining Maxium Distance (cm) 0 09/20/22 0937   Wound Assessment Slough;[place]/red 09/20/22 0937   Drainage Amount Small 09/20/22 0937   Drainage Description Serous 09/20/22 0937   Odor None 09/20/22 0937   Rosalie-wound Assessment Hyperkeratosis (callous) 09/20/22 0937   Margins Defined edges 09/20/22 0937   Wound Thickness Description not for Pressure Injury Full thickness 09/20/22 0937   Number of days: 132       Percent of Wound(s) Debrided: approximately 100%    Total  Area  Debrided: 6.55 sq cm     Bleeding:  Minimal    Hemostasis Achieved:  not needed    Procedural Pain:  0  / 10     Post Procedural Pain:  0 / 10     Response to treatment:  Well tolerated by patient. Status of wound progress and description from last visit: The wounds on the left foot are stable. Last visit right plantar foot wound able to probe to bone, resulting right BKA. The BKA site is well approximated with staples intact, no S&S infection. The patient continues to orellana depression post his mother's death. Advanced Modality Checklist: Skin substitutes    [x] Yes []  No  Is the wound Greater than 1.0 sq cm  [x] Yes []  No  Has the wound had Documented Treatment for 30 days ? [] Yes [x]  No  Recurrent Wound with Skin Substitute with Last Year?  [] Yes [x]  No  Radiographic testing in the last 3 months? [] Yes [x]  No  Vascular Assessment in the last 6 months? [x] Yes []  No  Smoking Status  [x] Yes []  No  Nutrition Assessed  [x] Yes []  No  Wound Free from infection   [x] Yes []  No  Is wound free of eschar, slough, and/or Bio [place]?   [] Yes [x]  No  Malignant Process in Wound  [] Yes []  No  Hemoglobin A1C in the last 3 months?  last A1c 10.4 as of 4/26/22  [x] Yes []  No  Off loading Diabetic Foot Ulcer? [x] Yes []  No Compression Therapy of 20 mmHg or Greater? Plan:       Discharge Instructions         PHYSICIAN ORDERS AND DISCHARGE INSTRUCTIONS     NOTE: Upon discharge from the [address], you will receive a patient experience survey. We would be grateful if you would take the time to fill this survey out. Wound cleansing:              Do not scrub or use excessive force. Wash hands with soap and water before and after dressing changes. Prior to applying a clean dressing, cleanse wound with normal saline, wound cleanser, or mild soap and water. Ask the physician or nurse before getting the wound(s) wet in a shower     Daily Wound management:              Keep weight off wounds and reposition every 2 hours. Avoid standing for long periods of time. Apply wraps/stockings in AM and remove at bedtime. If swelling is present, elevate legs to the level of the heart or above for 30 minutes 4-5 times a day and/or when sitting. When taking antibiotics take entire prescription as ordered by physician do not stop taking until medicine is all gone. Wound Care Notes:  Rx: Lashon Seo  Apply for grafts 05/11/22: aLL GRAFTS approved 06/08/22 for Left Toe amp site  Reapply for graft 08/22/22 for Left toes amp site   YADY's   Right 1.07      Left    1.1           Date: 08/15/22           Grafts Left Foot Amp Site  Puraply #1 Graft #1 4x4 fenestrated 06/15/22   Puraply #2 Graft #2 4x4 fenestrated 06/22/22                                    Orders for this week:  09/20/22           Left Plantar, and Left Great toe amp site--Clean with soap and water, pat  dry. Apply anasept gel and stimulen powder to wound bed.    Pack Left foot with ioplex and stimulen and place ioplex on amp site   Cover with  Sorbex. Wrap with Coban 2 Lite. Leave in place for 1 week         Right Leg Amp Site--Clean with soap and water, pat dry. Apply Betadine to Amp Site. Cover with Telfa, wrap with confrom, secure with tape. Wear Brace. Follow Up Instructions: At the [address] [place] [address] in 1 week: Tuesday   Primary Wound Care Provider: Cass Holden CNP   Call  for any questions or concerns.   Central [phone]      Treatment Note Wound 22 #1 (onset 2 weeks) Left Medial Foot Amp Site-Dressing/Treatment:  (anasept gel stimulen powder ioplex  sorbex coban  2 lite)  Wound 22 #2 (onset unknown) Left Plantar-Dressing/Treatment:  (anasept gel, stimulen powder, ioplex sorbex coban 2 lite.)    Written Patient Dismissal Instructions Given            Electronically signed by RAFAEL Cade CNP on 2022 at 10:58 AM

## 2022-09-20 NOTE — PROGRESS NOTES
Multilayer Compression Wrap   (Not Unna) Below the Knee    NAME:  Rashid Norris OF BIRTH:  1980  MEDICAL RECORD NUMBER:  [id number]  DATE:  9/20/2022    Multilayer compression wrap: Removed old Multilayer wrap if indicated and wash leg with mild soap/water. Applied moisturizing agent to dry skin as needed. Applied primary and secondary dressing as ordered. Applied multilayered dressing below the knee to left lower leg. Instructed patient/caregiver not to remove dressing and to keep it clean and dry. Instructed patient/caregiver on complications to report to provider, such as pain, numbness in toes, heavy drainage, and slippage of dressing. Instructed patient on purpose of compression dressing and on activity and exercise recommendations.       Electronically signed by Pedro Lares LPN on 2/36/7110 at 09:92 AM

## 2022-09-20 NOTE — DISCHARGE INSTRUCTIONS
PHYSICIAN ORDERS AND DISCHARGE INSTRUCTIONS     NOTE: Upon discharge from the [address], you will receive a patient experience survey. We would be grateful if you would take the time to fill this survey out. Wound cleansing:              Do not scrub or use excessive force. Wash hands with soap and water before and after dressing changes. Prior to applying a clean dressing, cleanse wound with normal saline, wound cleanser, or mild soap and water. Ask the physician or nurse before getting the wound(s) wet in a shower     Daily Wound management:              Keep weight off wounds and reposition every 2 hours. Avoid standing for long periods of time. Apply wraps/stockings in AM and remove at bedtime. If swelling is present, elevate legs to the level of the heart or above for 30 minutes 4-5 times a day and/or when sitting. When taking antibiotics take entire prescription as ordered by physician do not stop taking until medicine is all gone. Wound Care Notes:  Rx: Adrian Martins  Apply for grafts 05/11/22: aLL GRAFTS approved 06/08/22 for Left Toe amp site  Reapply for graft 08/22/22 for Left toes amp site   YADY's   Right 1.07      Left    1.1           Date: 08/15/22           Grafts Left Foot Amp Site  Puraply #1 Graft #1 4x4 fenestrated 06/15/22   Puraply #2 Graft #2 4x4 fenestrated 06/22/22                                    Orders for this week:  09/20/22             Has home care, but does not know which agency (is not [facility])    Left Plantar, and Left Great toe amp site--Clean with soap and water, pat  dry. Apply anasept gel and stimulen powder to wound bed. Pack Left foot with ioplex and stimulen and place ioplex on amp site   Cover with Sorbex. Wrap with Coban 2 Lite.    Leave in place for 1 week        Right Leg Amp Site -- Clean with soap and water, pat dry. Apply Betadine to Amp Site. Cover with Telfa, wrap with conform, secure with tape. Wear Brace. Follow Up Instructions: At the [address] in 1 week: Tuesday   Primary Wound Care Provider: Meghan Headley CNP   Call  for any questions or concerns.   Central [phone]

## 2022-09-21 ENCOUNTER — CARE COORDINATION (OUTPATIENT)
Dept: CARE COORDINATION | Age: 42
End: 2022-09-21

## 2022-09-21 NOTE — CARE COORDINATION
Call to [facility], left message for nurse Rama Cowden re: home care nurse and med rec and ensuring dc summary and med list rcvd. Spoke to Rama Cowden [phone] with request to review meds with pt. Will fax dc summary/med list to home care. Faxed via rightfax to [address] [place] [address] attn Rama Cowden.

## 2022-09-21 NOTE — CARE COORDINATION
Ambulatory Care Coordination Note  9/21/2022    ACC: Jenniffer Martinez, MEGAN    Summary Note: Call to pt for acm f/u. Reports he had his leg amputated and support provided. Has fallen twice in last week, per PT pt is to be using wheelchair to prevent falls. . Home nurse came out yesterday and to come back in a few days. Set up with home therapy. Have all been out. PT wants pt to stay in wheelchair. Swetha Her has not came out yet referral placed by sw for the [place] waiver. Pt Has mini fridge, but no reg fridge. Has not spoken with [address]. Encouraged pt to call to possible obtain fridge. Will see surgeon at wound care center. Reports he will wheel himself. Advised to contact ric for transpo as safer option and pt reports he is able and familiar with doing so. Pt reports he ill call pcp office to schedule f/u ov. Interested in prepackaged medicine and possible delivery. Attempted to review meds with pt reports home nurse went over them and he reports he hasnt stopped any that's hes aware of. ACM reiewed med chaanges with pt. Plan: Will also contact home care to ensure dc summary/new med list recvd for home med rec. Call to [address], left message for Dusty Brian regarding this. Ambulatory Care Coordination Assessment    Care Coordination Protocol  Referral from Primary Care Provider: No  Week 1 - Initial Assessment     Do you have all of your prescriptions and are they filled?: Yes  Barriers to medication adherence: None  Are you able to afford your medications?: Yes  How often do you have trouble taking your medications the way you have been told to take them?: I always take them as prescribed. Do you have Home O2 Therapy?: No      Ability to seek help/take action for Emergent Urgent situations i.e. fire, crime, inclement weather or health crisis. : Independent  Ability to ambulate to restroom: Needs Assistance  Ability handle personal hygeine needs (bathing/dressing/grooming):  Independent  Ability to manage Medications: Independent  Ability to prepare Food Preparation: Independent  Ability to maintain home (clean home, laundry): Independent  Ability to drive and/or has transportation: Independent  Ability to do shopping: Independent  Ability to manage finances: Independent  Is patient able to live independently?: Yes     Current Housing: Private Residence        Per the Fall Risk Screening, did the patient have 2 or more falls or 1 fall with injury in the past year?: Yes  How often do you think you are about to fall and you do NOT fall? For example, you grab something to stabilize yourself or hold onto a wall/furniture?: Frequently  Use of a Mobility Aid: Yes  Difficulty walking/impaired gait: Yes  Issues with feet or shoes like numbness, edema, shoes not fitting: Yes  Changes in vision, poor vision or poor lighting in environment: No  Dizziness: Yes  Other Fall Risk: Yes  What other fall risks does the patient have?: amputation toes     Frequent urination at night?: No  Do you use rails/bars?: No  Do you have a non-slip tub mat?: Yes     Are you experiencing loss of meaning?: Yes (Comment: Mother recently passed)  Are you experiencing loss of hope and peace?: Yes     Thinking about your patient's physical health needs, are there any symptoms or problems (risk indicators) you are unsure about that require further investigation?: Mild vague physical symptoms or problems; but do not impact on daily life or are not of concern to patient   Are the patients physical health problems impacting on their mental well-being?: Moderate to severe impact upon mental well-being and preventing enjoyment of usual activities   Are there any problems with your patients lifestyle behaviors (alcohol, drugs, diet, exercise) that are impacting on physical or mental well-being?: Moderate to severe impact on well-being, preventing enjoyment of usual activities   Suggested Interventions and Community Resources  Fall Risk Prevention:  In Process Home Health Services: In Process   Home Care Waiver: In Process   Registered Dietician: In Process   Social Work: In Process   Zone Management Tools: In Process                    Prior to Admission medications    Medication Sig Start Date End Date Taking? Authorizing Provider   oxyCODONE (ROXICODONE) 5 MG immediate release tablet Take 1 tablet by mouth every 6 hours as needed for Pain for up to 7 days.  9/16/22 9/23/22  Khoa Coreas MD   docusate sodium (COLACE, DULCOLAX) 100 MG CAPS Take 100 mg by mouth daily 9/17/22   REDD Rueda MD   aspirin 81 MG chewable tablet Take 81 mg by mouth daily    Historical Provider, MD   pantoprazole (PROTONIX) 20 MG tablet Take 1 tablet by mouth every morning (before breakfast) 9/2/22   Miguelangel Loza MD   metFORMIN (GLUCOPHAGE) 500 MG tablet Take 2 tablets by mouth 2 times daily (with meals) Indications: Start tomorrow 03/14 9/2/22 9/16/22  Miguelangel Loza MD   metoclopramide (REGLAN) 10 MG tablet Take 1 tablet by mouth 4 times daily (before meals and nightly) 8/29/22 9/2/22  RAFAEL Fregoso CNP   sucralfate (CARAFATE) 1 GM/10ML suspension Take 10 mLs by mouth 4 times daily for 7 days 8/18/22 8/25/22  RAFAEL Uribe CNP   glyBURIDE (DIABETA) 5 MG tablet Take 2 tablets by mouth in the morning and at bedtime 8/1/22   RAFAEL Uribe CNP   Dulaglutide 1.5 MG/0.5ML SOPN Inject 1.5 mg into the skin once a week 8/1/22   RAFAEL Uribe CNP   metoprolol succinate (TOPROL XL) 25 MG extended release tablet Take 1 tablet by mouth in the morning. 7/29/22   RAFAEL Uribe CNP   losartan (COZAAR) 25 MG tablet Take 1 tablet by mouth in the morning. 7/29/22 10/27/22  RAFAEL Uribe CNP   omeprazole (PRILOSEC) 20 MG delayed release capsule Take 1 capsule by mouth once daily in the morning 7/29/22 9/2/22  RAFAEL Uribe CNP   FLUoxetine (PROZAC) 40 MG capsule Take 2 capsules by mouth in the morning. 7/28/22 8/27/22  Brianna Brito, APRN - CNP insulin glargine (LANTUS SOLOSTAR) 100 UNIT/ML injection pen Inject 30 Units into the skin nightly 6/30/22 9/28/22  Miguelangel Lofton MD   Alcohol Swabs PADS  4/27/22   Historical Provider, MD   atorvastatin (LIPITOR) 40 MG tablet Take 1 tablet by mouth nightly 4/27/22 8/18/22  Cameron Vargas MD   blood glucose monitor strips Test 2 times a day & as needed for symptoms of irregular blood glucose. Dispense sufficient amount for indicated testing frequency plus additional to accommodate PRN testing needs. 4/27/22   Cameron Vargas MD   pioglitazone (ACTOS) 30 MG tablet Take 1 tablet by mouth daily 4/27/22 9/16/22  Cameron Vargas MD   blood glucose monitor kit and supplies Dispense sufficient amount for indicated testing frequency plus additional to accommodate PRN testing needs. Dispense all needed supplies to include: monitor, strips, lancing device, lancets, control solutions, alcohol swabs. 6/15/20   Lokesh Bah MD   FreeStyle Lancets MISC 1 each by Does not apply route daily 6/15/20   Lokesh Bah MD   acetaminophen (TYLENOL) 325 MG tablet Take 2 tablets by mouth every 4 hours as needed for Pain or Fever 2/28/18 9/2/22  Melquiades Bose MD       Future Appointments   Date Time Provider Rosario Nesbitt   9/23/2022  9:45 AM SCHEDULE, [address] [facility]   9/27/2022  9:00 AM RAFAEL Castillo - CNP [facility]   10/6/2022 11:00 AM Rosalinda [facility]   11/10/2022 10:00 AM [facility]ueda [facility]   11/29/2022  1:00 [facility]      and   General Assessment           No new symptoms.

## 2022-09-21 NOTE — DISCHARGE SUMMARY
Discharge Summary    Name:  Angelo Hawkins /Age/Sex: 1980  (39 y.o. male)   MRN & CSN:  [id number] & [id number] Admission Date/Time: 2022  6:22 PM   Attending:  No att. providers found Discharging Physician: Jael Mesa MD     Angelo Hawkins is a 39 y.o. male with pmh of HTN, uncontrolled T2DM with long term insulin use, peripheral neuropathy, depression, anxiety, recent admission and discharge for pneumatosis of the cecum and ascending colon who presented with Necrotizing fasciitis ([facility] [address] He is s/p guillotine BKA and revision with closure. He underwent evaluation with PT and was deemed a candidate for the ARU, and was subsequently accepted to the ARU        Brief overview of diagnosis and plan during hospital stay:  #Sepsis 2/2 necrotizing fascitis s/p right BKA  --Patient is s/p BKA revision and closure, with bandage in place. He was on broad abx with plan to de-escalate once surgical pathology resulted. Wound care was on board    #Chronic diabetic foot ulcer of the left toe a/w DM  --Wound care followed. dressing in place C/D/I     #IDDM  --Patient was on basal and sliding scale bolus, lantus 30units nightly and SSI  --Sugars fluctuated, accuchek was monitored and SSI was titrated to aid in maintaining tight glycemic control for healing purposes     #Mood disorder  --Home regimen of fluoxetine     #HTN  --Home regimen restarted. The patient expressed appropriate understanding of and agreement with the discharge recommendations, medications, and plan.      Consults this admission:  IP CONSULT TO GENERAL SURGERY  PHARMACY TO DOSE VANCOMYCIN  IP CONSULT TO HOSPITALIST  IP CONSULT TO CASE MANAGEMENT  IP CONSULT TO IV TEAM      Discharge Instruction:   Follow up appointments: Gen surg  Wound center  Primary care physician: Edward Salgado    Diet:  diabetic diet   Activity: activity as tolerated  Disposition: Discharged to:   []Home, []HHC, []SNF, [x]Acute Rehab, []Hospice   Condition on discharge: Stable    Discharge Medications:        Medication List        CONTINUE taking these medications      Alcohol Swabs Pads     blood glucose monitor kit and supplies  Dispense sufficient amount for indicated testing frequency plus additional to accommodate PRN testing needs. Dispense all needed supplies to include: monitor, strips, lancing device, lancets, control solutions, alcohol swabs. FreeStyle Lancets Misc  1 each by Does not apply route daily            ASK your doctor about these medications      acetaminophen 325 MG tablet  Commonly known as: TYLENOL  Take 2 tablets by mouth every 4 hours as needed for Pain or Fever     aspirin 81 MG chewable tablet     atorvastatin 40 MG tablet  Commonly known as: LIPITOR  Take 1 tablet by mouth nightly     blood glucose test strips  Test 2 times a day & as needed for symptoms of irregular blood glucose. Dispense sufficient amount for indicated testing frequency plus additional to accommodate PRN testing needs. collagenase 250 UNIT/GM ointment     Dulaglutide 1.5 MG/0.5ML Sopn  Inject 1.5 mg into the skin once a week     FLUoxetine 40 MG capsule  Commonly known as: PROzac  Take 2 capsules by mouth in the morning. glyBURIDE 5 MG tablet  Commonly known as: DIABETA  Take 2 tablets by mouth in the morning and at bedtime     Lantus SoloStar 100 UNIT/ML injection pen  Generic drug: insulin glargine  Inject 30 Units into the skin nightly     losartan 25 MG tablet  Commonly known as: COZAAR  Take 1 tablet by mouth in the morning. metFORMIN 500 MG tablet  Commonly known as: GLUCOPHAGE  Take 2 tablets by mouth 2 times daily (with meals) Indications: Start tomorrow 03/14     metoclopramide 10 MG tablet  Commonly known as: REGLAN  Take 1 tablet by mouth 4 times daily (before meals and nightly)     metoprolol succinate 25 MG extended release tablet  Commonly known as: TOPROL XL  Take 1 tablet by mouth in the morning.      metroNIDAZOLE 500 MG tablet  Commonly known as: FLAGYL  Take 1 tablet by mouth 3 times daily for 28 days     omeprazole 20 MG delayed release capsule  Commonly known as: PRILOSEC  Take 1 capsule by mouth once daily in the morning     ondansetron 4 MG disintegrating tablet  Commonly known as: ZOFRAN-ODT  Take 1 tablet by mouth every 8 hours as needed for Nausea or Vomiting     pantoprazole 20 MG tablet  Commonly known as: PROTONIX  Take 1 tablet by mouth every morning (before breakfast)     pioglitazone 30 MG tablet  Commonly known as: Actos  Take 1 tablet by mouth daily     sucralfate 1 GM/10ML suspension  Commonly known as: Carafate  Take 10 mLs by mouth 4 times daily for 7 days              Objective Findings at Discharge:       BMP/CBC  No results for input(s): NA, K, CL, CO2, BUN, CREATININE, GLU, WBC, HEMOGLOBIN, HCT, PLT in the last 72 hours. IMAGING:  EXAMINATION:   CTA OF THE RIGHT LOWER EXTREMITY 9/2/2022 9:03 pm       TECHNIQUE:   CTA of the right lower extremity was performed after the administration of   intravenous contrast. Multiplanar reformatted images are provided for review. MIP images are provided for review. . Automated exposure control, iterative   reconstruction, and/or weight based adjustment of the mA/kV was utilized to   reduce the radiation dose to as low as reasonably achievable. COMPARISON:   None. HISTORY   ORDERING SYSTEM PROVIDED HISTORY: cellulitis, gas in foot, ?osteo   TECHNOLOGIST PROVIDED HISTORY:   Reason for exam:->cellulitis, gas in foot, ?osteo   Reason for Exam: cellulitis, gas in foot, ?osteo       FINDINGS:   Bones: Prior transmetatarsal amputation of the 1st through 5th digits. There   is osseous erosion and destruction with cortical irregularity in the   remaining great toe metatarsal.  Degenerative changes are noted in the   midfoot. Soft Tissue: There is diffuse subcutaneous edema and fluid in the calf soft   tissues. There is associated skin thickening.   There is an irregular rim enhancing gas and fluid containing collection in the anterior ankle soft   tissues extending into the foot, which measures approximately 2.4 x 1.7 cm. There is additional soft tissue gas adjacent to the great toe. There is   ulceration in the great toe soft tissues. There are mildly enlarged   popliteal lymph nodes. There are enlarged bilateral inguinal lymph nodes   with the right measuring up to 3 x 2.4 cm. Visualized bowel loops are   nondilated. Bladder is partially distended. Vessels: The SFA is patent with scattered atherosclerosis. The popliteal   artery is patent with scattered atherosclerosis. Evaluation of the calf   vasculature is mildly limited due to contrast timing. The PT trunk is   patent. The anterior tibial posterior tibial, and peroneal arteries appear   patent to the foot. Additional Information: Patient seen and examined day of discharge.  For more information regarding patient's care please contact Kashmir Novoa [facility] records [phone]    Discharge Time of 35 minutes    Electronically signed by Liat Villa MD on 9/20/2022 at 9:52 PM

## 2022-09-22 ENCOUNTER — CARE COORDINATION (OUTPATIENT)
Dept: CARE COORDINATION | Age: 42
End: 2022-09-22

## 2022-09-22 NOTE — CARE COORDINATION
Rcvd call from [address] Shon Gamboa from [facility] expressing concerns after her home visit with pt today. ACM made call to [place] for f/u and if concerns relayed to pcp office. Per Juan Lazo she Read in chart and pt told her he does not have a doctor. David Daniels that pts pcp was Mahin English, JOSE and office contact info provided. Reports she will contact office with concerns  acm will continue to follow.

## 2022-09-22 NOTE — CARE COORDINATION
Received phone call from [facility], Cynthiamegan Mckeon [phone] who reported concerns regarding Pt's health. Cynthia Mckeon reported Pt appeared clammy, sweaty, vs were normal, Pt has PT and SN in the home. Cynthia Mckeon reported Pt \"could not keep eyes open during visit\". Cynthia Mckeon reported Pt reports he has been forgetting to take his medications and has had a fall since his return home. Phone call to Donnie Graff. David Bryan reported she spoke with Pt yesterday and will call [facility] nurse, Cynthia Mckeon to discuss her concerns and to advise her to call PCP regarding her concerns. [facility] stating she spoke with [facility] nurse Cynthia Mckeon and provided her with NP name and phone number to call to report concerns. Phone call to Pt's brother, Kristyn Law. Discussed concerns regarding insurance. Advised Pt's brother to make joint call with Pt to [facility] to assist in identifying issue with insurance and to become Pt's authorized representative. KOLE provided Pt's brother with contact information for:  [facility] ([facility] member services [phone]  [facility] [phone]    Phone call to [phone] nurse, Cynthia Mckeon to determine if they have a SW who could go to the home to assist Pt in making calls to figure out issues with insurance. No answer, voicemail message left requesting return call to [facility], contact number provided. KOLE plan of care:  KOLE will contact Pt on 9/23 @ 10am to assist him in calling [facility] regarding obtaining a refrigerator. KOLE will follow up next week to obtain update on referral to [facility] and [address] [place] [address]. KOLE will continue to assist Pt in identifying new health insurance provider.

## 2022-09-23 ENCOUNTER — CARE COORDINATION (OUTPATIENT)
Dept: CARE COORDINATION | Age: 42
End: 2022-09-23

## 2022-09-23 ENCOUNTER — HOSPITAL ENCOUNTER (OUTPATIENT)
Age: 42
Setting detail: OBSERVATION
Discharge: HOME | End: 2022-09-25
Attending: STUDENT IN AN ORGANIZED HEALTH CARE EDUCATION/TRAINING PROGRAM
Payer: COMMERCIAL

## 2022-09-23 ENCOUNTER — APPOINTMENT (OUTPATIENT)
Dept: GENERAL RADIOLOGY | Age: 42
End: 2022-09-23
Payer: COMMERCIAL

## 2022-09-23 ENCOUNTER — TELEPHONE (OUTPATIENT)
Dept: FAMILY MEDICINE CLINIC | Age: 42
End: 2022-09-23

## 2022-09-23 DIAGNOSIS — R07.89 OTHER CHEST PAIN: Primary | ICD-10-CM

## 2022-09-23 LAB
ALBUMIN SERPL-MCNC: 3.6 GM/DL (ref 3.4–5)
ALP BLD-CCNC: 84 IU/L (ref 40–129)
ALT SERPL-CCNC: 21 U/L (ref 10–40)
AMPHETAMINES: NEGATIVE
ANION GAP SERPL CALCULATED.3IONS-SCNC: 13 MMOL/L (ref 4–16)
AST SERPL-CCNC: 20 IU/L (ref 15–37)
BARBITURATE SCREEN URINE: NEGATIVE
BASE EXCESS MIXED: 0.7 (ref 0–1.2)
BENZODIAZEPINE SCREEN, URINE: NEGATIVE
BILIRUB SERPL-MCNC: 0.5 MG/DL (ref 0–1)
BUN BLDV-MCNC: 10 MG/DL (ref 6–23)
CALCIUM SERPL-MCNC: 9.2 MG/DL (ref 8.3–10.6)
CANNABINOID SCREEN URINE: ABNORMAL
CHLORIDE BLD-SCNC: 101 MMOL/L (ref 99–110)
CO2: 23 MMOL/L (ref 21–32)
COCAINE METABOLITE: NEGATIVE
COMMENT: ABNORMAL
CREAT SERPL-MCNC: 1.1 MG/DL (ref 0.9–1.3)
EKG ATRIAL RATE: 81 BPM
EKG DIAGNOSIS: NORMAL
EKG P AXIS: 64 DEGREES
EKG P-R INTERVAL: 146 MS
EKG Q-T INTERVAL: 382 MS
EKG QRS DURATION: 90 MS
EKG QTC CALCULATION (BAZETT): 443 MS
EKG R AXIS: 41 DEGREES
EKG T AXIS: 22 DEGREES
EKG VENTRICULAR RATE: 81 BPM
GFR AFRICAN AMERICAN: >60 ML/MIN/1.73M2
GFR NON-AFRICAN AMERICAN: >60 ML/MIN/1.73M2
GLUCOSE BLD-MCNC: 148 MG/DL (ref 70–99)
GLUCOSE BLD-MCNC: 159 MG/DL (ref 70–99)
GLUCOSE BLD-MCNC: 163 MG/DL (ref 70–99)
GLUCOSE BLD-MCNC: 184 MG/DL (ref 70–99)
HCO3 VENOUS: 25.4 MMOL/L (ref 19–25)
HCT VFR BLD CALC: 42.6 % (ref 42–52)
HEMOGLOBIN: 13.6 GM/DL (ref 13.5–18)
LIPASE: 25 IU/L (ref 13–60)
MCH RBC QN AUTO: 28.6 PG (ref 27–31)
MCHC RBC AUTO-ENTMCNC: 31.9 % (ref 32–36)
MCV RBC AUTO: 89.5 FL (ref 78–100)
O2 SAT, VEN: 94.5 % (ref 50–70)
OPIATES, URINE: NEGATIVE
OXYCODONE: ABNORMAL
PCO2, VEN: 40 MMHG (ref 38–52)
PDW BLD-RTO: 14.4 % (ref 11.7–14.9)
PH VENOUS: 7.41 (ref 7.32–7.42)
PHENCYCLIDINE, URINE: NEGATIVE
PLATELET # BLD: 572 K/CU MM (ref 140–440)
PMV BLD AUTO: 9.4 FL (ref 7.5–11.1)
PO2, VEN: 233 MMHG (ref 28–48)
POTASSIUM SERPL-SCNC: 4.2 MMOL/L (ref 3.5–5.1)
RBC # BLD: 4.76 M/CU MM (ref 4.6–6.2)
SARS-COV-2, NAAT: NOT DETECTED
SODIUM BLD-SCNC: 137 MMOL/L (ref 135–145)
SOURCE: NORMAL
T4 FREE: 1.16 NG/DL (ref 0.9–1.8)
TOTAL PROTEIN: 8.9 GM/DL (ref 6.4–8.2)
TROPONIN T: 0.02 NG/ML
TSH HIGH SENSITIVITY: 2.87 UIU/ML (ref 0.27–4.2)
WBC # BLD: 7.7 K/CU MM (ref 4–10.5)

## 2022-09-23 PROCEDURE — 83690 ASSAY OF LIPASE: CPT

## 2022-09-23 PROCEDURE — 6360000002 HC RX W HCPCS: Performed by: NURSE PRACTITIONER

## 2022-09-23 PROCEDURE — 6370000000 HC RX 637 (ALT 250 FOR IP): Performed by: INTERNAL MEDICINE

## 2022-09-23 PROCEDURE — 87635 SARS-COV-2 COVID-19 AMP PRB: CPT

## 2022-09-23 PROCEDURE — G0378 HOSPITAL OBSERVATION PER HR: HCPCS

## 2022-09-23 PROCEDURE — 6370000000 HC RX 637 (ALT 250 FOR IP): Performed by: STUDENT IN AN ORGANIZED HEALTH CARE EDUCATION/TRAINING PROGRAM

## 2022-09-23 PROCEDURE — 96374 THER/PROPH/DIAG INJ IV PUSH: CPT

## 2022-09-23 PROCEDURE — 2580000003 HC RX 258: Performed by: STUDENT IN AN ORGANIZED HEALTH CARE EDUCATION/TRAINING PROGRAM

## 2022-09-23 PROCEDURE — 93005 ELECTROCARDIOGRAM TRACING: CPT | Performed by: STUDENT IN AN ORGANIZED HEALTH CARE EDUCATION/TRAINING PROGRAM

## 2022-09-23 PROCEDURE — 80053 COMPREHEN METABOLIC PANEL: CPT

## 2022-09-23 PROCEDURE — 36415 COLL VENOUS BLD VENIPUNCTURE: CPT

## 2022-09-23 PROCEDURE — 82962 GLUCOSE BLOOD TEST: CPT

## 2022-09-23 PROCEDURE — 84443 ASSAY THYROID STIM HORMONE: CPT

## 2022-09-23 PROCEDURE — 99285 EMERGENCY DEPT VISIT HI MDM: CPT

## 2022-09-23 PROCEDURE — 94761 N-INVAS EAR/PLS OXIMETRY MLT: CPT

## 2022-09-23 PROCEDURE — 84439 ASSAY OF FREE THYROXINE: CPT

## 2022-09-23 PROCEDURE — 6360000002 HC RX W HCPCS: Performed by: STUDENT IN AN ORGANIZED HEALTH CARE EDUCATION/TRAINING PROGRAM

## 2022-09-23 PROCEDURE — 84484 ASSAY OF TROPONIN QUANT: CPT

## 2022-09-23 PROCEDURE — 85027 COMPLETE CBC AUTOMATED: CPT

## 2022-09-23 PROCEDURE — 71045 X-RAY EXAM CHEST 1 VIEW: CPT

## 2022-09-23 PROCEDURE — 93010 ELECTROCARDIOGRAM REPORT: CPT | Performed by: INTERNAL MEDICINE

## 2022-09-23 PROCEDURE — 80061 LIPID PANEL: CPT

## 2022-09-23 PROCEDURE — 96372 THER/PROPH/DIAG INJ SC/IM: CPT

## 2022-09-23 PROCEDURE — 82805 BLOOD GASES W/O2 SATURATION: CPT

## 2022-09-23 PROCEDURE — 80307 DRUG TEST PRSMV CHEM ANLYZR: CPT

## 2022-09-23 RX ORDER — METOPROLOL SUCCINATE 25 MG/1
25 TABLET, EXTENDED RELEASE ORAL DAILY
Status: DISCONTINUED | OUTPATIENT
Start: 2022-09-23 | End: 2022-09-23

## 2022-09-23 RX ORDER — SODIUM CHLORIDE 0.9 % (FLUSH) 0.9 %
5-40 SYRINGE (ML) INJECTION PRN
Status: DISCONTINUED | OUTPATIENT
Start: 2022-09-23 | End: 2022-09-25 | Stop reason: HOSPADM

## 2022-09-23 RX ORDER — ATORVASTATIN CALCIUM 40 MG/1
40 TABLET, FILM COATED ORAL NIGHTLY
Status: DISCONTINUED | OUTPATIENT
Start: 2022-09-23 | End: 2022-09-25 | Stop reason: HOSPADM

## 2022-09-23 RX ORDER — ASPIRIN 81 MG/1
81 TABLET, CHEWABLE ORAL DAILY
Status: DISCONTINUED | OUTPATIENT
Start: 2022-09-24 | End: 2022-09-25 | Stop reason: HOSPADM

## 2022-09-23 RX ORDER — LOSARTAN POTASSIUM 100 MG/1
100 TABLET ORAL DAILY
Status: DISCONTINUED | OUTPATIENT
Start: 2022-09-24 | End: 2022-09-25 | Stop reason: HOSPADM

## 2022-09-23 RX ORDER — ASPIRIN 81 MG/1
324 TABLET, CHEWABLE ORAL ONCE
Status: COMPLETED | OUTPATIENT
Start: 2022-09-23 | End: 2022-09-23

## 2022-09-23 RX ORDER — FLUOXETINE HYDROCHLORIDE 20 MG/1
80 CAPSULE ORAL DAILY
Status: DISCONTINUED | OUTPATIENT
Start: 2022-09-23 | End: 2022-09-25 | Stop reason: HOSPADM

## 2022-09-23 RX ORDER — METOPROLOL SUCCINATE 25 MG/1
25 TABLET, EXTENDED RELEASE ORAL ONCE
Status: COMPLETED | OUTPATIENT
Start: 2022-09-23 | End: 2022-09-23

## 2022-09-23 RX ORDER — LOSARTAN POTASSIUM 25 MG/1
25 TABLET ORAL DAILY
Status: DISCONTINUED | OUTPATIENT
Start: 2022-09-23 | End: 2022-09-23

## 2022-09-23 RX ORDER — PANTOPRAZOLE SODIUM 20 MG/1
20 TABLET, DELAYED RELEASE ORAL
Status: DISCONTINUED | OUTPATIENT
Start: 2022-09-24 | End: 2022-09-25 | Stop reason: HOSPADM

## 2022-09-23 RX ORDER — POLYETHYLENE GLYCOL 3350 17 G/17G
17 POWDER, FOR SOLUTION ORAL DAILY PRN
Status: DISCONTINUED | OUTPATIENT
Start: 2022-09-23 | End: 2022-09-25 | Stop reason: HOSPADM

## 2022-09-23 RX ORDER — CLOPIDOGREL BISULFATE 75 MG/1
75 TABLET ORAL DAILY
Status: DISCONTINUED | OUTPATIENT
Start: 2022-09-23 | End: 2022-09-25 | Stop reason: HOSPADM

## 2022-09-23 RX ORDER — ONDANSETRON 2 MG/ML
4 INJECTION INTRAMUSCULAR; INTRAVENOUS EVERY 6 HOURS PRN
Status: DISCONTINUED | OUTPATIENT
Start: 2022-09-23 | End: 2022-09-25 | Stop reason: HOSPADM

## 2022-09-23 RX ORDER — SODIUM CHLORIDE 9 MG/ML
INJECTION, SOLUTION INTRAVENOUS PRN
Status: DISCONTINUED | OUTPATIENT
Start: 2022-09-23 | End: 2022-09-25 | Stop reason: HOSPADM

## 2022-09-23 RX ORDER — INSULIN LISPRO 100 [IU]/ML
0-4 INJECTION, SOLUTION INTRAVENOUS; SUBCUTANEOUS
Status: DISCONTINUED | OUTPATIENT
Start: 2022-09-23 | End: 2022-09-25 | Stop reason: HOSPADM

## 2022-09-23 RX ORDER — LOSARTAN POTASSIUM 25 MG/1
25 TABLET ORAL ONCE
Status: COMPLETED | OUTPATIENT
Start: 2022-09-23 | End: 2022-09-23

## 2022-09-23 RX ORDER — KETOROLAC TROMETHAMINE 30 MG/ML
15 INJECTION, SOLUTION INTRAMUSCULAR; INTRAVENOUS ONCE
Status: COMPLETED | OUTPATIENT
Start: 2022-09-23 | End: 2022-09-23

## 2022-09-23 RX ORDER — CARVEDILOL 6.25 MG/1
12.5 TABLET ORAL 2 TIMES DAILY WITH MEALS
Status: DISCONTINUED | OUTPATIENT
Start: 2022-09-23 | End: 2022-09-25 | Stop reason: HOSPADM

## 2022-09-23 RX ORDER — DEXTROSE MONOHYDRATE 100 MG/ML
INJECTION, SOLUTION INTRAVENOUS CONTINUOUS PRN
Status: DISCONTINUED | OUTPATIENT
Start: 2022-09-23 | End: 2022-09-25 | Stop reason: HOSPADM

## 2022-09-23 RX ORDER — ENOXAPARIN SODIUM 100 MG/ML
40 INJECTION SUBCUTANEOUS DAILY
Status: DISCONTINUED | OUTPATIENT
Start: 2022-09-23 | End: 2022-09-25 | Stop reason: HOSPADM

## 2022-09-23 RX ORDER — INSULIN GLARGINE 100 [IU]/ML
20 INJECTION, SOLUTION SUBCUTANEOUS NIGHTLY
Status: DISCONTINUED | OUTPATIENT
Start: 2022-09-23 | End: 2022-09-25 | Stop reason: HOSPADM

## 2022-09-23 RX ORDER — ONDANSETRON 4 MG/1
4 TABLET, ORALLY DISINTEGRATING ORAL EVERY 8 HOURS PRN
Status: DISCONTINUED | OUTPATIENT
Start: 2022-09-23 | End: 2022-09-25 | Stop reason: HOSPADM

## 2022-09-23 RX ORDER — INSULIN LISPRO 100 [IU]/ML
0-4 INJECTION, SOLUTION INTRAVENOUS; SUBCUTANEOUS NIGHTLY
Status: DISCONTINUED | OUTPATIENT
Start: 2022-09-23 | End: 2022-09-25 | Stop reason: HOSPADM

## 2022-09-23 RX ORDER — SODIUM CHLORIDE 0.9 % (FLUSH) 0.9 %
5-40 SYRINGE (ML) INJECTION EVERY 12 HOURS SCHEDULED
Status: DISCONTINUED | OUTPATIENT
Start: 2022-09-23 | End: 2022-09-25 | Stop reason: HOSPADM

## 2022-09-23 RX ORDER — ACETAMINOPHEN 325 MG/1
650 TABLET ORAL EVERY 6 HOURS PRN
Status: DISCONTINUED | OUTPATIENT
Start: 2022-09-23 | End: 2022-09-25 | Stop reason: HOSPADM

## 2022-09-23 RX ADMIN — METOPROLOL SUCCINATE 25 MG: 25 TABLET, EXTENDED RELEASE ORAL at 07:22

## 2022-09-23 RX ADMIN — LOSARTAN POTASSIUM 25 MG: 25 TABLET, FILM COATED ORAL at 07:18

## 2022-09-23 RX ADMIN — METOPROLOL SUCCINATE 25 MG: 25 TABLET, EXTENDED RELEASE ORAL at 17:42

## 2022-09-23 RX ADMIN — ENOXAPARIN SODIUM 40 MG: 100 INJECTION SUBCUTANEOUS at 17:41

## 2022-09-23 RX ADMIN — POLYETHYLENE GLYCOL (3350) 17 G: 17 POWDER, FOR SOLUTION ORAL at 18:43

## 2022-09-23 RX ADMIN — SODIUM CHLORIDE, PRESERVATIVE FREE 10 ML: 5 INJECTION INTRAVENOUS at 20:43

## 2022-09-23 RX ADMIN — FLUOXETINE HYDROCHLORIDE 80 MG: 20 CAPSULE ORAL at 17:42

## 2022-09-23 RX ADMIN — CARVEDILOL 12.5 MG: 6.25 TABLET, FILM COATED ORAL at 20:52

## 2022-09-23 RX ADMIN — ATORVASTATIN CALCIUM 40 MG: 40 TABLET, FILM COATED ORAL at 20:41

## 2022-09-23 RX ADMIN — ONDANSETRON 4 MG: 4 TABLET, ORALLY DISINTEGRATING ORAL at 20:41

## 2022-09-23 RX ADMIN — ASPIRIN 81 MG CHEWABLE TABLET 324 MG: 81 TABLET CHEWABLE at 07:18

## 2022-09-23 RX ADMIN — ACETAMINOPHEN 650 MG: 325 TABLET ORAL at 18:00

## 2022-09-23 RX ADMIN — CLOPIDOGREL BISULFATE 75 MG: 75 TABLET ORAL at 20:52

## 2022-09-23 RX ADMIN — INSULIN GLARGINE 20 UNITS: 100 INJECTION, SOLUTION SUBCUTANEOUS at 21:20

## 2022-09-23 RX ADMIN — KETOROLAC TROMETHAMINE 15 MG: 30 INJECTION, SOLUTION INTRAMUSCULAR; INTRAVENOUS at 21:06

## 2022-09-23 ASSESSMENT — PAIN DESCRIPTION - LOCATION: LOCATION: LEG

## 2022-09-23 ASSESSMENT — PAIN SCALES - GENERAL
PAINLEVEL_OUTOF10: 6
PAINLEVEL_OUTOF10: 5

## 2022-09-23 ASSESSMENT — PAIN SCALES - WONG BAKER: WONGBAKER_NUMERICALRESPONSE: 0

## 2022-09-23 ASSESSMENT — PAIN DESCRIPTION - DESCRIPTORS: DESCRIPTORS: ACHING

## 2022-09-23 ASSESSMENT — PAIN DESCRIPTION - ORIENTATION: ORIENTATION: RIGHT;LEFT

## 2022-09-23 NOTE — ED NOTES
Called and gave report to charge nurse pt going to room Im Deepak , [facility]  09/23/22 [id number]

## 2022-09-23 NOTE — ED PROVIDER NOTES
Emergency Department Encounter    Patient: Judy Olguin  MRN: [id number]  : 1980  Date of Evaluation: 2022  ED Provider:  [address] [place] [address],[address], DO    Triage Chief Complaint:   Chest Pain and Shortness of Breath    [place]:  Judy Olguin is a 39 y.o. male with a past medical history of hypertension that presents to the ED with a history of stabbing, continuous, nonradiating, anterior chest pain with a severity of 9/10. Pain started last night without any obvious triggering events. Patient also endorses an associated mild shortness of breath and anorexia. Patient denies fever, palpitations, lightheadedness, nausea, diaphoresis, alcoholism, cancer, recent prolonged immobility, trauma or surgery.        ROS - see HPI, below listed is current ROS at time of my eval:  General:  No fevers, no chills, no weakness  Eyes:  No recent vison changes, no discharge  ENT:  No sore throat, no nasal congestion, no hearing changes  Cardiovascular:   chest pain,   Respiratory:   shortness of breath,   Gastrointestinal:  No pain, no nausea, no vomiting, no diarrhea  Musculoskeletal:  No muscle pain, no joint pain  Skin:  No rash, no pruritis, no easy bruising  Neurologic:  No speech problems, no headache, no extremity numbness, no extremity tingling, no extremity weakness  Psychiatric:  No anxiety  Genitourinary:  No dysuria, no hematuria  Endocrine:  No unexpected weight gain, no unexpected weight loss  Extremities:  no edema, no pain    Past Medical History:   Diagnosis Date    Abscess of left foot 2022    Anemia associated with acute blood loss 2022    Callus of foot     Right foot - see's Dr. Bonny Boyce    Cellulitis of left foot 2022    Diabetic ulcer of left midfoot associated with type 2 diabetes mellitus, with muscle involvement without evidence of necrosis ([facility] [address] 2022    Diabetic ulcer of left midfoot associated with type 2 diabetes mellitus, with necrosis of muscle ([address] 2021    Diabetic ulcer of right midfoot associated with type 2 diabetes mellitus, with fat layer exposed ([facility] [address] 8/11/2020    Dizziness     positional    Essential hypertension     Follows with PCP    Hyperlipidemia     Lumbar radiculopathy     Septic embolism ([address] 6/13/2020    Subacute osteomyelitis of left foot ([address] 4/22/2022     Past Surgical History:   Procedure Laterality Date    ACHILLES TENDON SURGERY Right 1/8/2021    RIGHT ACHILLES TENDON LENGTHENING REPAIR performed by Vish Sutherland DPM at [address] [facility]  03/2018    [address] [place] [address]    DENTAL SURGERY      teeth extractions -half per patient    HERNIA REPAIR Bilateral 5/27/2020    BIALTERAL HERNIA INGUINAL REPAIR performed by Jermain Ruth MD at [address] [facility] Right 9/2/2022    LEG AMPUTATION BELOW KNEE performed by Timm Rinne, MD at [address] [facility] Right 9/5/2022    LEG AMPUTATION BELOW KNEE REVISION performed by Timm Rinne, MD at [facility] 2018    [place] OFFICE/OUTPT VISIT,PROCEDURE ONLY Left 4/17/2018    L5-S1 HEMILAMINECTOMY, REMOVAL OF DISC LEFT SIDE performed by Johnson Reeder MD at [address] Right 1/8/2021    RIGHT TRANSMETATARSAL TOE AMPUTATION performed by Vish Sutherland DPM at [address] Left 4/23/2022    LEFT RAY GREAT TOE AMPUTATION performed by Fitz Maxwell MD at [address] [facility]       Family History   Problem Relation Age of Onset    Heart Disease Father     Diabetes Father     Diabetes Mother     Heart Disease Mother     Other Mother     Kidney Disease Mother     Heart Attack Mother      Social History     Socioeconomic History    Marital status: Single     Spouse name: Not on file    Number of children: Not on file    Years of education: Not on file    Highest education level: Not on file   Occupational History    Not on file   Tobacco Use    Smoking status: Never    Smokeless tobacco: sucralfate (CARAFATE) 1 GM/10ML suspension Take 10 mLs by mouth 4 times daily for 7 days 414 mL 0    glyBURIDE (DIABETA) 5 MG tablet Take 2 tablets by mouth in the morning and at bedtime 120 tablet 1    Dulaglutide 1.5 MG/0.5ML SOPN Inject 1.5 mg into the skin once a week 5 pen 3    metoprolol succinate (TOPROL XL) 25 MG extended release tablet Take 1 tablet by mouth in the morning. 90 tablet 1    losartan (COZAAR) 25 MG tablet Take 1 tablet by mouth in the morning. 90 tablet 1    FLUoxetine (PROZAC) 40 MG capsule Take 2 capsules by mouth in the morning. 30 capsule 3    insulin glargine (LANTUS SOLOSTAR) 100 UNIT/ML injection pen Inject 30 Units into the skin nightly 5 pen 2    Alcohol Swabs PADS       atorvastatin (LIPITOR) 40 MG tablet Take 1 tablet by mouth nightly 90 tablet 1    blood glucose monitor strips Test 2 times a day & as needed for symptoms of irregular blood glucose. Dispense sufficient amount for indicated testing frequency plus additional to accommodate PRN testing needs. 100 strip 0    blood glucose monitor kit and supplies Dispense sufficient amount for indicated testing frequency plus additional to accommodate PRN testing needs. Dispense all needed supplies to include: monitor, strips, lancing device, lancets, control solutions, alcohol swabs. 1 kit 0    FreeStyle Lancets MISC 1 each by Does not apply route daily 100 each 3     No Known Allergies    Nursing Notes Reviewed    Physical Exam:  Triage VS:    ED Triage Vitals [09/23/22 0627]   Enc Vitals Group      BP (!) 193/108      Heart Rate 87      Resp 24      Temp 98.4 °F (36.9 °C)      Temp Source Oral      SpO2       Weight 170 lb (77.1 kg)      Height 6' (1.829 m)      Head Circumference       Peak Flow       Pain Score       Pain Loc       Pain Edu? Excl. in [address]? My pulse ox interpretation is - normal    General appearance:  No acute distress. Skin:  Warm. Dry. Eye:  Extraocular movements intact.      Ears, nose, mouth and throat:  Oral mucosa moist   Neck:  Trachea midline. Extremity:  No swelling. Normal ROM     Heart:  Regular rate and rhythm, normal S1 & S2, no extra heart sounds. Perfusion:  intact  Respiratory:  Lungs clear to auscultation bilaterally. Respirations nonlabored. Abdominal:  Normal bowel sounds. Soft. Nontender. Non distended. Back:  No CVA tenderness to palpation     Neurological:  Alert and oriented times 3. No focal neuro deficits. Psychiatric:  Appropriate    I have reviewed and interpreted all of the currently available lab results from this visit (if applicable):    Results for orders placed or performed during the hospital encounter of 09/23/22   CBC   Result Value Ref Range    WBC 7.7 4.0 - 10.5 K/CU MM    RBC 4.76 4.6 - 6.2 M/CU MM    Hemoglobin 13.6 13.5 - 18.0 GM/DL    Hematocrit 42.6 42 - 52 %    MCV 89.5 78 - 100 FL    MCH 28.6 27 - 31 PG    MCHC 31.9 (L) 32.0 - 36.0 %    RDW 14.4 11.7 - 14.9 %    Platelets 541 (H) 468 - 440 K/CU MM    MPV 9.4 7.5 - 11.1 FL   Blood Gas, Venous   Result Value Ref Range    pH, Eric 7.41 7.32 - 7.42    pCO2, Eric 40 38 - 52 mmHG    pO2, Eric 233 (H) 28 - 48 mmHG    Base Exc, Mixed . 7 0 - 1.2    HCO3, Venous 25.4 (H) 19 - 25 MMOL/L    O2 Sat, Eric 94.5 (H) 50 - 70 %    Comment VBG    Comprehensive Metabolic Panel   Result Value Ref Range    Sodium 137 135 - 145 MMOL/L    Potassium 4.2 3.5 - 5.1 MMOL/L    Chloride 101 99 - 110 mMol/L    CO2 23 21 - 32 MMOL/L    BUN 10 6 - 23 MG/DL    Creatinine 1.1 0.9 - 1.3 MG/DL    Glucose 184 (H) 70 - 99 MG/DL    Calcium 9.2 8.3 - 10.6 MG/DL    Albumin 3.6 3.4 - 5.0 GM/DL    Total Protein 8.9 (H) 6.4 - 8.2 GM/DL    Total Bilirubin 0.5 0.0 - 1.0 MG/DL    ALT 21 10 - 40 U/L    AST 20 15 - 37 IU/L    Alkaline Phosphatase 84 40 - 129 IU/L    GFR Non-African American >60 >60 mL/min/1.73m2    GFR African American >60 >60 mL/min/1.73m2    Anion Gap 13 4 - 16   Troponin   Result Value Ref Range    Troponin T 0.016 (H) <0.01 NG/ML   Lipase   Result Value Ref Range    Lipase 25 13 - 60 IU/L   EKG 12 Lead   Result Value Ref Range    Ventricular Rate 81 BPM    Atrial Rate 81 BPM    P-R Interval 146 ms    QRS Duration 90 ms    Q-T Interval 382 ms    QTc Calculation (Bazett) 443 ms    P Axis 64 degrees    R Axis 41 degrees    T Axis 22 degrees    Diagnosis       Normal sinus rhythm  Possible Left atrial enlargement  Borderline ECG  When compared with ECG of 21-MAY-2022 08:05,  No significant change was found        Labs Reviewed   CBC - Abnormal; Notable for the following components:       Result Value    MCHC 31.9 (*)     Platelets 429 (*)     All other components within normal limits   BLOOD GAS, VENOUS - Abnormal; Notable for the following components:    pO2, Eric 233 (*)     HCO3, Venous 25.4 (*)     O2 Sat, Eric 94.5 (*)     All other components within normal limits   COMPREHENSIVE METABOLIC PANEL - Abnormal; Notable for the following components:    Glucose 184 (*)     Total Protein 8.9 (*)     All other components within normal limits   TROPONIN - Abnormal; Notable for the following components:    Troponin T 0.016 (*)     All other components within normal limits   LIPASE   TROPONIN   TROPONIN       Radiographs (if obtained):  Radiologist's Report Reviewed:  XR CHEST PORTABLE   Final Result   No acute cardiopulmonary process. EKG (if obtained): (All EKG's are interpreted by myself in the absence of a cardiologist)  Normal sinus rhythm,  IN interval 146  Ventricular rate 81  QRS duration 90  QT/QTc 382/443  PRT axes 64 41 22  No STEMI, Brugada, A. fib/a flutter, V. tach, SVT or torsades    MDM:  25-year-old male with a past medical history of hypertension presenting to the ED with a history of chest pain started last night. On physical examination, vital signs indicative of 193/104 blood pressure, patient admits to not taking his blood pressure medications for the past 4-5 days.   Patient is given his home blood pressure medications. Rest of physical examination is unremarkable. CP was reproducible. Patient also given aspirin. 2:42 PM  Laboratory evaluation shows elevated troponin. At this time patient's symptoms have  improved  Discussed with cardiology. Consult accepted, patient to be admitted. Discussed with hospitalist.     Clinical Impression:  1. Other chest pain      Disposition referral (if applicable):  No follow-up provider specified. Disposition medications (if applicable):  New Prescriptions    No medications on file     ED Provider Disposition Time  DISPOSITION        Comment: Please note this report has been produced using speech recognition software and may contain errors related to that system including errors in grammar, punctuation, and spelling, as well as words and phrases that may be inappropriate. Efforts were made to edit the dictations.        [address] [place] [address],[address], DO  09/23/22 8455

## 2022-09-23 NOTE — ED NOTES
Medication History  [facility]    Patient Name: Haydee Noriega 1980     Medication history has been completed by: Km De La O CPhT    Source(s) of information: patient and insurance claims     Primary Care Physician: RAFAEL Porter CNP     Pharmacy: [facility]    Allergies as of 09/23/2022    (No Known Allergies)        Prior to Admission medications    Medication Sig Start Date End Date Taking? Authorizing Provider   oxyCODONE (ROXICODONE) 5 MG immediate release tablet Take 1 tablet by mouth every 6 hours as needed for Pain for up to 7 days.  9/16/22 9/23/22  Cherie Goldberg MD   docusate sodium (COLACE, DULCOLAX) 100 MG CAPS Take 100 mg by mouth daily 9/17/22   REDD Adkins MD   aspirin 81 MG chewable tablet Take 81 mg by mouth daily    Historical Provider, MD   pantoprazole (PROTONIX) 20 MG tablet Take 1 tablet by mouth every morning (before breakfast) 9/2/22   Vanesa Stanley MD   glyBURIDE (DIABETA) 5 MG tablet Take 2 tablets by mouth in the morning and at bedtime 8/1/22 Aurelia FridayRAFAEL CNP   Dulaglutide 1.5 MG/0.5ML SOPN Inject 1.5 mg into the skin once a week 8/1/22 Aurelia Friday, APRN - CNP   metoprolol succinate (TOPROL XL) 25 MG extended release tablet Take 1 tablet by mouth in the morning. 7/29/22   Aracelis Friday, APRN - CNP   losartan (COZAAR) 25 MG tablet Take 1 tablet by mouth in the morning. 7/29/22 10/27/22  Aracelis Friday, APRN - CNP   FLUoxetine (PROZAC) 40 MG capsule Take 2 capsules by mouth in the morning. 7/28/22 8/27/22  Aracelis Friday, APRN - CNP   insulin glargine (LANTUS SOLOSTAR) 100 UNIT/ML injection pen Inject 30 Units into the skin nightly 6/30/22 9/28/22  Steven Bolton MD   Alcohol Swabs PADS  4/27/22   Historical Provider, MD   atorvastatin (LIPITOR) 40 MG tablet Take 1 tablet by mouth nightly 4/27/22 8/18/22  Quyen Andrea MD   blood glucose monitor strips Test 2 times a day & as needed for symptoms of irregular blood glucose. Dispense sufficient amount for indicated testing frequency plus additional to accommodate PRN testing needs. 4/27/22   Angelina Tinsley MD   blood glucose monitor kit and supplies Dispense sufficient amount for indicated testing frequency plus additional to accommodate PRN testing needs. Dispense all needed supplies to include: monitor, strips, lancing device, lancets, control solutions, alcohol swabs. 6/15/20   Alonso Rush MD   FreeStyle Lancets [facility] 1 each by Does not apply route daily 6/15/20   Alonso Rush MD     Medications removed from list (include reason, ex. noncompliance, medication cost, therapy complete etc.):   Acetaminophen discontinued 09/02/22  Metformin discontinued 09/16/22  Metoclopramide discontinued 09/02/22  Omeprazole discontinued 09/02/22  Pioglitazone discontinued 09/16/22    Comments:  Medication list per insurance claims and previous hospital discharge summary notes. Patient informed interviewer that he has not taken medications for the past week. Patient states he has not taken any insulin unless he has received it during his hospital stays.   Claims for escitalopram patient states he is not taking this  Claims Lorazepam patient states he is not taking this  Claims for hydroxyzine patient states he is not taking this  Claims for htcz patient states he is not taking this  Claims for metronidazole patient states he is not taking this    To my knowledge the above medication history is accurate as of 9/23/2022 8:02 AM.   Sofiya Lam CPhT   9/23/2022 8:02 AM

## 2022-09-23 NOTE — CONSULTS
CARDIOLOGY CONSULT NOTE    Reason for consultation: Chest pain     Referring physician: Peewee Adorno MD      Primary care physician: Demetrice Phan, APRN - CNP        Dear Peewee Adorno MD   Thanks for the consult. History of present illness:Julien is a 39 y. o.year old who  presents with  chest pain for last few days, happening daily, intermittent for 15 to 20 mins and aggravated with activity substernal also,reproducible with palpation, radiated to shoulder, 6/10, tender to touch,associated with shortness of breath, + sweating, nausea, did not get NTG in ED. No fever, no chills, no nausea no vomiting. Blood pressure, cholesterol, blood glucose and weight are well controlled. Chief Complaint   Patient presents with    Chest Pain    Shortness of Breath       Past medical history:    has a past medical history of Abscess of left foot, Anemia associated with acute blood loss, Callus of foot, Cellulitis of left foot, Diabetic ulcer of left midfoot associated with type 2 diabetes mellitus, with muscle involvement without evidence of necrosis ([address] Diabetic ulcer of left midfoot associated with type 2 diabetes mellitus, with necrosis of muscle ([address] Diabetic ulcer of right midfoot associated with type 2 diabetes mellitus, with fat layer exposed ([address] Dizziness, Essential hypertension, Hyperlipidemia, Lumbar radiculopathy, Septic embolism ([address] and Subacute osteomyelitis of left foot ([address] Past surgical history:   has a past surgical history that includes Cardiac catheterization (03/2018); [place] tooth extraction; Dental surgery; pr office/outpt visit,procedure only (Left, 4/17/2018); lumbar laminectomy (2018); Toe amputation (Right, 1/8/2021); Achilles tendon surgery (Right, 1/8/2021); hernia repair (Bilateral, 5/27/2020); Toe amputation (Left, 4/23/2022); Leg amputation below knee (Right, 9/2/2022); and Leg amputation below knee (Right, 9/5/2022).   Social History:   reports that he has never smoked. He has never used smokeless tobacco. He reports current alcohol use. He reports that he does not use drugs.   Family history:   no family history of CAD, STROKE of DM    [START ON 9/24/2022] aspirin chewable tablet 81 mg, Daily  atorvastatin (LIPITOR) tablet 40 mg, Nightly  FLUoxetine (PROZAC) capsule 80 mg, Daily  losartan (COZAAR) tablet 25 mg, Daily  metoprolol succinate (TOPROL XL) extended release tablet 25 mg, Daily  [START ON 9/24/2022] pantoprazole (PROTONIX) tablet 20 mg, QAM AC  insulin glargine (LANTUS) injection vial 20 Units, Nightly  insulin lispro (HUMALOG) injection vial 0-4 Units, TID WC  insulin lispro (HUMALOG) injection vial 0-4 Units, Nightly  glucose chewable tablet 16 g, PRN  dextrose bolus 10% 125 mL, PRN   Or  dextrose bolus 10% 250 mL, PRN  glucagon (rDNA) injection 1 mg, PRN  dextrose 10 % infusion, Continuous PRN  sodium chloride flush 0.9 % injection 5-40 mL, 2 times per day  sodium chloride flush 0.9 % injection 5-40 mL, PRN  0.9 % sodium chloride infusion, PRN  enoxaparin (LOVENOX) injection 40 mg, Daily  ondansetron (ZOFRAN-ODT) disintegrating tablet 4 mg, Q8H PRN   Or  ondansetron (ZOFRAN) injection 4 mg, Q6H PRN  polyethylene glycol (GLYCOLAX) packet 17 g, Daily PRN  acetaminophen (TYLENOL) tablet 650 mg, Q6H PRN   Or  acetaminophen (TYLENOL) suppository 650 mg, Q6H PRN      Current Facility-Administered Medications   Medication Dose Route Frequency Provider Last Rate Last Admin    [START ON 9/24/2022] aspirin chewable tablet 81 mg  81 mg Oral Daily Antoine Hummel MD        atorvastatin (LIPITOR) tablet 40 mg  40 mg Oral Nightly Antoine Hummel MD        FLUoxetine (PROZAC) capsule 80 mg  80 mg Oral Daily Antoine Hummel MD        losartan (COZAAR) tablet 25 mg  25 mg Oral Daily Antoine Hummel MD        metoprolol succinate (TOPROL XL) extended release tablet 25 mg  25 mg Oral Daily Antoine Hummel MD        [START ON 9/24/2022] pantoprazole (PROTONIX) tablet 20 mg  20 mg Oral QAM AC Roseline Gilbert MD        insulin glargine (LANTUS) injection vial 20 Units  20 Units SubCUTAneous Nightly Roseline Gilbert MD        insulin lispro (HUMALOG) injection vial 0-4 Units  0-4 Units SubCUTAneous TID WC Roseline Gilbert MD        insulin lispro (HUMALOG) injection vial 0-4 Units  0-4 Units SubCUTAneous Nightly Roseline Gilbert MD        glucose chewable tablet 16 g  4 tablet Oral PRN Roseline Gilbert MD        dextrose bolus 10% 125 mL  125 mL IntraVENous PRN Roseline Gilbert MD        Or    dextrose bolus 10% 250 mL  250 mL IntraVENous PRN Roseline Gilbert MD        glucagon (rDNA) injection 1 mg  1 mg SubCUTAneous PRN Roseline Gilbert MD        dextrose 10 % infusion   IntraVENous Continuous PRN Roseline Gilbert MD        sodium chloride flush 0.9 % injection 5-40 mL  5-40 mL IntraVENous 2 times per day Roseline Gilbert MD        sodium chloride flush 0.9 % injection 5-40 mL  5-40 mL IntraVENous PRN Roseline Gilbert MD        0.9 % sodium chloride infusion   IntraVENous PRN Roseline Gilbert MD        enoxaparin (LOVENOX) injection 40 mg  40 mg SubCUTAneous Daily Roseline Gilbert MD        ondansetron (ZOFRAN-ODT) disintegrating tablet 4 mg  4 mg Oral Q8H PRN Roseline Gilbert MD        Or    ondansetron (ZOFRAN) injection 4 mg  4 mg IntraVENous Q6H PRN Roseline Gilbert MD        polyethylene glycol (GLYCOLAX) packet 17 g  17 g Oral Daily PRN Roseline Gilbert MD        acetaminophen (TYLENOL) tablet 650 mg  650 mg Oral Q6H PRN Roseline Gilbret MD        Or    acetaminophen (TYLENOL) suppository 650 mg  650 mg Rectal Q6H PRN Roseline Gilbert MD              Review of Systems:    Constitutional: No Fever or Weight Loss    Eyes: No Decreased Vision  ENT: No Headaches, Hearing Loss or Vertigo  Cardiovascular: + chest pain, dyspnea on exertion, palpitations or loss of consciousness  Respiratory: No cough or wheezing    Gastrointestinal: No abdominal pain, appetite loss, blood in stools, constipation, diarrhea or heartburn  Genitourinary: No dysuria, trouble voiding, or hematuria  Musculoskeletal: No gait disturbance, weakness or joint complaints  Integumentary: No rash or pruritis  Neurological: No TIA or stroke symptoms  Psychiatric: No anxiety or depression  Endocrine: No malaise, fatigue or temperature intolerance  Hematologic/Lymphatic: No bleeding problems, blood clots or swollen lymph nodes  Allergic/Immunologic: No nasal congestion or hives  All systems negative except as marked. Physical Examination:    Vitals:    09/23/22 1348   BP: (!) 153/94   Pulse: 77   Resp: 16   Temp: 98 °F (36.7 °C)   SpO2: 100%      Wt Readings from Last 3 Encounters:   09/23/22 170 lb (77.1 kg)   09/15/22 173 lb 1 oz (78.5 kg)   09/04/22 181 lb 7 oz (82.3 kg)     Body mass index is 23.06 kg/m². General Appearance: No distress, conversant     Constitutional: Well developed, Well nourished, No acute distress, Non-toxic appearance. HENT:  Normocephalic, Atraumatic, Bilateral external ears normal, Oropharynx moist, No oral exudates, Nose normal. Neck- Normal range of motion, No tenderness, Supple, No stridor,no apical-carotid delay, no carotid bruit  Eyes: PERRL, EOMI, Conjunctiva normal, No discharge. Respiratory:  Normal breath sounds, No respiratory distress, No wheezing, No chest tenderness. ,no use of accessory muscles, diaphragm movement is normal  Cardiovascular: (PMI) apex non displaced,no lifts no thrills, no s3,no s4, Normal heart rate, Normal rhythm, No murmurs, No rubs, No gallops.  Carotid arteries pulse and amplitude are normal no bruit, no abdominal bruit noted ( normal abdominal aorta ausculation), femoral arteries pulse and amplitude are normal no bruit, pedal pulses are normal.  GI: Bowel sounds normal, Soft, No tenderness, No masses, No pulsatile masses, no hepatosplenomegally, no bruits  : External genitalia appear normal, No masses or lesions. No discharge. No CVA tenderness. Musculoskeletal: Right BKA intact distal pulses, No edema, No tenderness, No cyanosis, No clubbing. Good range of motion in all major joints. No tenderness to palpation or major deformities noted. Back- No tenderness. Integument: Warm, Dry, No erythema, No rash. Skin: no rash, no ulcers  Lymphatic: No lymphadenopathy noted. Neurologic: Alert & oriented x 3, Normal motor function, Normal sensory function, No focal deficits noted. Psychiatric: Affect normal, Judgment normal, Mood normal.   Lab Review        Recent Labs       09/28/16 0010    WBC  12.3*    HGB  14.4    HCT  43.8    PLT  316            Recent Labs       09/28/16 0010    NA  136    K  3.9    CL  95*    CO2  23    BUN  10    CREATININE  0.8           Recent Labs       09/28/16 0010    AST  12*    ALT  10    BILITOT  0.4    ALKPHOS  124       No results for input(s): TROPONINI in the last 72 hours. No results found for: BNP  No results found for: INR, PROTIME        EKG:nsr     Chest Xray:nad     ECHO:pending  Labs, echo, meds reviewed  Assessment:      Recommendations:     Chest pain: Patient has mild elevated troponin, last year he had a similar nonspecific rise of troponin and has normal heart cath, follow trend of troponin and decide clinically, he may have COVID or URI causing nonspecific reactive troponin will recommend to check COVID test, his multiple troponin could be from uncontrolled hypertension also  Hypertension uncontrolled, will recommend to increase losartan to 100 mg agree with adding metoprolol at this time follow-up  Diabetes check glucose and use insulin as needed history of right BKA    All labs, medications and tests reviewed, continue all other medications of all above medical condition listed as is.           Salma Burgos MD,   Salma Burgos MD, 9/23/2022 5:37 PM

## 2022-09-23 NOTE — CONSULTS
[id number]  Pt. Actively follows with Dr. Olivia Campo. Has seen him regularly at his office including about 6 months ago per pt.  has seen him here before but he follows with Dr. Olivia Campo outpatient and would like to continue to follow with him  Dr. Olivia Campo is working with us tomorrow and will actually be here to round on patients as such I am seeing him tonight for Dr. Olivia Campo and he will round on him tomorrow. I notified the [facility] of this as well  Atypical CP and elevated Trop- mildly elevated at this time- not positive  He had an episode of dysphagia and SOB/coughing after drinking while I was in the room. CP is reproducible so not felt cardiac at this time but will trend trop and give cardioprotective meds cautiously with the dysphagia to lower BP  Dr. Olivia Campo will see the pt. Tomorrow in the hospital as he is covering for Dr. Ava Traylor. Thank you. Patient known to DR. Olivia Campo  He had cath in  2018 and  2021 negative CAD  Trop slightly elevated--medical treatment  Hx of PAD     Electronically signed by Nurys Rebolledo MD on 9/23/22 at 7:06 PM EDT

## 2022-09-23 NOTE — H&P
V2.0  History and Physical      Name:  Carmen Duncan /Age/Sex: 1980  (39 y.o. male)   MRN & CSN:  [id number] & [id number] Encounter Date/Time: 2022 1:46 PM EDT   Location:  [facility] PCP: Maggy [facility] Day: 1    Assessment and Plan:   Carmen Duncan is a 39 y.o. male with a pmh of diabetes mellitus, hypertension, right BKA, not vaccinated for COVID who presents with Chest pain    Hospital Problems             Last Modified POA    * (Principal) Chest pain 2022 Yes       Elevated troponin and atypical chest pain rule out ACS: Heart score 2, EKG no acute changes, troponin 0.016, trend troponin, EDP consulted cardiology recommended admission and will follow up.   Epigastric pain with loss of appetite.:  Suspected dyspepsia, lipase normal, continue with pantoprazole, will obtain COVID test  Uncontrolled hypertension BP on presentation 193/108, noncompliant with medication, continue with losartan, metoprolol and as needed labetalol as needed  Type 2 diabetes mellitus: Last A1c/2210.5, hold oral antidiabetic continue with Lantus, sliding scale and hypoglycemic protocol and diabetic diet  Right BKA    Disposition:   Current Living situation: Lives alone in [facility]  Expected Disposition: Home  Estimated D/C: 1 to 2 days    Diet Regular diabetic   DVT Prophylaxis [x] Lovenox, []  Heparin, [] SCDs, [] Ambulation,  [] Eliquis, [] Xarelto, [] Coumadin   Code Status Full   Surrogate Decision Maker/ POA Brother     History from:     patient    History of Present Illness:     Chief Complaint: Chest pain  Carmen Duncan is a 39 y.o. male with pmh of hypertension, diabetes mellitus, right BKA, not vaccinated for COVID who presents with not feeling well from 5 days, he states that he was apparently asymptomatic until 5 days ago started having loss of appetite, not eating, started having pain in the epigastric area from 3 days associated with some bloating, flatus started having some shortness of breath overnight so presented to ED. pain in epigastric area on and off no radiation sticking like no aggravating or relieving factors. Denies any chest pain, orthopnea or PND, nausea, vomiting, palpitations, sweating, headache. Review of Systems: Need 10 Elements   Review of Systems    Constitutional: No fever no chills  HEENT: No headache no dizziness  Cardiovascular: No chest pain no palpitations  Respiratory: No cough no shortness of breath  Abdomen: Epigastric pain, no diarrhea, no nausea, no vomiting,   Musculoskeletal: Right BKA  Neuro: No numbness or tingling  Skin: No rash       Objective:   No intake or output data in the 24 hours ending 09/23/22 1346   Vitals:   Vitals:    09/23/22 0722 09/23/22 0723 09/23/22 0725 09/23/22 1154   BP: (!) 184/97  (!) 161/104 (!) 158/86   Pulse: 84  82 84   Resp:   19 16   Temp:    98.6 °F (37 °C)   TempSrc:    Oral   SpO2:  100% 98% 98%   Weight:       Height:           Medications Prior to Admission     Prior to Admission medications    Medication Sig Start Date End Date Taking? Authorizing Provider   oxyCODONE (ROXICODONE) 5 MG immediate release tablet Take 1 tablet by mouth every 6 hours as needed for Pain for up to 7 days.  9/16/22 9/23/22  Abe Cary MD   docusate sodium (COLACE, DULCOLAX) 100 MG CAPS Take 100 mg by mouth daily 9/17/22   C Miguelangel Bryant MD   aspirin 81 MG chewable tablet Take 81 mg by mouth daily    Historical Provider, MD   pantoprazole (PROTONIX) 20 MG tablet Take 1 tablet by mouth every morning (before breakfast) 9/2/22   Ivan Dc MD   metFORMIN (GLUCOPHAGE) 500 MG tablet Take 2 tablets by mouth 2 times daily (with meals) Indications: Start tomorrow 03/14 9/2/22 9/16/22  Ivan Dc MD   metoclopramide (REGLAN) 10 MG tablet Take 1 tablet by mouth 4 times daily (before meals and nightly) 8/29/22 9/2/22  RAFAEL Luis - CNP   sucralfate (CARAFATE) 1 GM/10ML suspension Take 10 mLs by mouth 4 times daily for 7 days 8/18/22 8/25/22  Faustino Gavel, APRN - CNP   glyBURIDE (DIABETA) 5 MG tablet Take 2 tablets by mouth in the morning and at bedtime 8/1/22   Faustino Gavel, APRN - CNP   Dulaglutide 1.5 MG/0.5ML SOPN Inject 1.5 mg into the skin once a week 8/1/22   Faustino Gavel, APRN - CNP   metoprolol succinate (TOPROL XL) 25 MG extended release tablet Take 1 tablet by mouth in the morning. 7/29/22   Faustino Gavel, APRN - CNP   losartan (COZAAR) 25 MG tablet Take 1 tablet by mouth in the morning. 7/29/22 10/27/22  Faustino Gavel, APRN - CNP   omeprazole (PRILOSEC) 20 MG delayed release capsule Take 1 capsule by mouth once daily in the morning 7/29/22 9/2/22  Faustino Gavel, APRN - CNP   FLUoxetine (PROZAC) 40 MG capsule Take 2 capsules by mouth in the morning. 7/28/22 8/27/22  Faustino Mjl, APRN - CNP   insulin glargine (LANTUS SOLOSTAR) 100 UNIT/ML injection pen Inject 30 Units into the skin nightly 6/30/22 9/28/22  Tianna Montes MD   Alcohol Swabs PADS  4/27/22   Rico Chance MD   atorvastatin (LIPITOR) 40 MG tablet Take 1 tablet by mouth nightly 4/27/22 8/18/22  Hans Aleman MD   blood glucose monitor strips Test 2 times a day & as needed for symptoms of irregular blood glucose. Dispense sufficient amount for indicated testing frequency plus additional to accommodate PRN testing needs. 4/27/22   Hans Aleman MD   pioglitazone (ACTOS) 30 MG tablet Take 1 tablet by mouth daily 4/27/22 9/16/22  Hans Aleman MD   blood glucose monitor kit and supplies Dispense sufficient amount for indicated testing frequency plus additional to accommodate PRN testing needs. Dispense all needed supplies to include: monitor, strips, lancing device, lancets, control solutions, alcohol swabs.  6/15/20   Deangelo Esquivel MD   FreeStyle Lancets MISC 1 each by Does not apply route daily 6/15/20   Rolena Alvin, MD   acetaminophen (TYLENOL) 325 MG tablet Take 2 tablets by mouth every 4 hours as needed for Pain or Fever 2/28/18 9/2/22  Roddy Toledo MD       Physical Exam: Need 8 Elements   Physical Exam     General: Afebrile, no distress  Eyes: EOMI  ENT: neck supple, no JVD  Cardiovascular: Regular rate. S1-S2 normal no murmur  Respiratory: Clear to auscultation air entry good bilaterally no wheezing no crackles  Gastrointestinal: Soft, mild tenderness over the epigastric area, bowel sounds normal no rebound or guarding  Genitourinary: no suprapubic tenderness  Musculoskeletal: Right BKA, left foot 2 toes amputated and in crêpe bandage left le  Skin: warm, dry  Neuro: Alert. Oriented no focal deficit  Psych: Mood appropriate. Past Medical History:   PMHx   Past Medical History:   Diagnosis Date    Abscess of left foot 4/22/2022    Anemia associated with acute blood loss 4/26/2022    Callus of foot     Right foot - see's Dr. Melida Barbour    Cellulitis of left foot 4/22/2022    Diabetic ulcer of left midfoot associated with type 2 diabetes mellitus, with muscle involvement without evidence of necrosis ([address] 4/26/2022    Diabetic ulcer of left midfoot associated with type 2 diabetes mellitus, with necrosis of muscle ([address] 6/7/2021    Diabetic ulcer of right midfoot associated with type 2 diabetes mellitus, with fat layer exposed ([address] 8/11/2020    Dizziness     positional    Essential hypertension     Follows with PCP    Hyperlipidemia     Lumbar radiculopathy     Septic embolism ([address] 6/13/2020    Subacute osteomyelitis of left foot ([address] 4/22/2022     PSHX:  has a past surgical history that includes Cardiac catheterization (03/2018); [place] tooth extraction; Dental surgery; pr office/outpt visit,procedure only (Left, 4/17/2018); lumbar laminectomy (2018); Toe amputation (Right, 1/8/2021); Achilles tendon surgery (Right, 1/8/2021); hernia repair (Bilateral, 5/27/2020); Toe amputation (Left, 4/23/2022); Leg amputation below knee (Right, 9/2/2022); and Leg amputation below knee (Right, 9/5/2022).   Allergies: No Known Allergies  Fam HX:  family history includes Diabetes in his father and mother; Heart Attack in his mother; Heart Disease in his father and mother; Kidney Disease in his mother; Other in his mother. Soc HX:   Social History     Socioeconomic History    Marital status: Single     Spouse name: None    Number of children: None    Years of education: None    Highest education level: None   Tobacco Use    Smoking status: Never    Smokeless tobacco: Never   Vaping Use    Vaping Use: Never used   Substance and Sexual Activity    Alcohol use: Yes     Comment: occasionally    Drug use: No    Sexual activity: Yes     Partners: Female     Social Determinants of Health     Financial Resource Strain: Medium Risk    Difficulty of Paying Living Expenses: Somewhat hard   Food Insecurity: Food Insecurity Present    Worried About [address] in the Last Year: Sometimes true    Ran Out of Food in the Last Year: Sometimes true   Transportation Needs: No Transportation Needs    Lack of Transportation (Medical): No    Lack of Transportation (Non-Medical):  No   Physical Activity: Inactive    Days of Exercise per Week: 0 days    Minutes of Exercise per Session: 0 min   Stress: Stress Concern Present    Feeling of Stress : Rather much   Social Connections: Unknown    Frequency of Communication with Friends and Family: Once a week    Attends Jehov[employer] witness Services: Never    Attends Club or Organization Meetings: Never    Marital Status: Never    Housing Stability: Low Risk     Unable to Pay for Housing in the Last Year: No    Number of Places Lived in the Last Year: 1    Unstable Housing in the Last Year: No       Medications:   Medications:    Infusions:   PRN Meds:     Labs      CBC:   Recent Labs     09/23/22  0630   WBC 7.7   HGB 13.6   *     BMP:    Recent Labs     09/23/22  1030      K 4.2      CO2 23   BUN 10   CREATININE 1.1   GLUCOSE 184*     Hepatic:   Recent Labs     09/23/22  1030   AST 20   ALT 21 unremarkable. No acute cardiopulmonary process.        Personally reviewed Lab Studies, Imaging, and discussed case with patient    Electronically signed by Antoine Hummel MD on 9/23/2022 at 1:46 PM

## 2022-09-23 NOTE — TELEPHONE ENCOUNTER
To Doctor on Call-    1 Rand Godinez Reporting----    Patient doesn't have a working refrigerator and does not have any food in the house. Was suppose to get Meals-[employer]-Wheels, but that has not happened yet. Last seen yesterday by the physical therapist.     Would like to have orders for a  for patient.

## 2022-09-23 NOTE — ED TRIAGE NOTES
Pt came via Medic d/t CP/SOB onset 4 hours ago, pain is in the mid upper/lower chest feels like pressure constantly. Pt states was laying down when pain started. Pt told medics he had loss of appetite and no drinking for a couple of days. Had recent amputation about 3 weeks ago. Pt is very quiet and hard to get any answers from.  Pt does deny SI/HI, states he doesn't have anymore friends and is afraid at his house bc he thinks they were trying to break in. RR even/unlabored Pt is disoriented to time

## 2022-09-24 LAB
ANION GAP SERPL CALCULATED.3IONS-SCNC: 10 MMOL/L (ref 4–16)
BASOPHILS ABSOLUTE: 0.1 K/CU MM
BASOPHILS RELATIVE PERCENT: 0.6 % (ref 0–1)
BUN BLDV-MCNC: 15 MG/DL (ref 6–23)
CALCIUM SERPL-MCNC: 8.7 MG/DL (ref 8.3–10.6)
CHLORIDE BLD-SCNC: 102 MMOL/L (ref 99–110)
CHOLESTEROL: 106 MG/DL
CO2: 26 MMOL/L (ref 21–32)
CREAT SERPL-MCNC: 1.5 MG/DL (ref 0.9–1.3)
DIFFERENTIAL TYPE: ABNORMAL
EOSINOPHILS ABSOLUTE: 0.2 K/CU MM
EOSINOPHILS RELATIVE PERCENT: 2.8 % (ref 0–3)
GFR AFRICAN AMERICAN: >60 ML/MIN/1.73M2
GFR NON-AFRICAN AMERICAN: 52 ML/MIN/1.73M2
GLUCOSE BLD-MCNC: 126 MG/DL (ref 70–99)
GLUCOSE BLD-MCNC: 145 MG/DL (ref 70–99)
GLUCOSE BLD-MCNC: 165 MG/DL (ref 70–99)
GLUCOSE BLD-MCNC: 191 MG/DL (ref 70–99)
GLUCOSE BLD-MCNC: 207 MG/DL (ref 70–99)
HCT VFR BLD CALC: 35.7 % (ref 42–52)
HDLC SERPL-MCNC: 28 MG/DL
HEMOGLOBIN: 11.1 GM/DL (ref 13.5–18)
IMMATURE NEUTROPHIL %: 0.3 % (ref 0–0.43)
LDL CHOLESTEROL CALCULATED: 29 MG/DL
LYMPHOCYTES ABSOLUTE: 2.7 K/CU MM
LYMPHOCYTES RELATIVE PERCENT: 31.8 % (ref 24–44)
MCH RBC QN AUTO: 28.6 PG (ref 27–31)
MCHC RBC AUTO-ENTMCNC: 31.1 % (ref 32–36)
MCV RBC AUTO: 92 FL (ref 78–100)
MONOCYTES ABSOLUTE: 0.7 K/CU MM
MONOCYTES RELATIVE PERCENT: 8.2 % (ref 0–4)
NUCLEATED RBC %: 0 %
PDW BLD-RTO: 14.5 % (ref 11.7–14.9)
PLATELET # BLD: 508 K/CU MM (ref 140–440)
PMV BLD AUTO: 9.1 FL (ref 7.5–11.1)
POTASSIUM SERPL-SCNC: 4 MMOL/L (ref 3.5–5.1)
RBC # BLD: 3.88 M/CU MM (ref 4.6–6.2)
SEGMENTED NEUTROPHILS ABSOLUTE COUNT: 4.8 K/CU MM
SEGMENTED NEUTROPHILS RELATIVE PERCENT: 56.3 % (ref 36–66)
SODIUM BLD-SCNC: 138 MMOL/L (ref 135–145)
TOTAL IMMATURE NEUTOROPHIL: 0.03 K/CU MM
TOTAL NUCLEATED RBC: 0 K/CU MM
TRIGL SERPL-MCNC: 246 MG/DL
TROPONIN T: 0.02 NG/ML
WBC # BLD: 8.6 K/CU MM (ref 4–10.5)

## 2022-09-24 PROCEDURE — 82962 GLUCOSE BLOOD TEST: CPT

## 2022-09-24 PROCEDURE — 84484 ASSAY OF TROPONIN QUANT: CPT

## 2022-09-24 PROCEDURE — 92610 EVALUATE SWALLOWING FUNCTION: CPT

## 2022-09-24 PROCEDURE — 96372 THER/PROPH/DIAG INJ SC/IM: CPT

## 2022-09-24 PROCEDURE — 94761 N-INVAS EAR/PLS OXIMETRY MLT: CPT

## 2022-09-24 PROCEDURE — 6370000000 HC RX 637 (ALT 250 FOR IP): Performed by: INTERNAL MEDICINE

## 2022-09-24 PROCEDURE — 2580000003 HC RX 258: Performed by: STUDENT IN AN ORGANIZED HEALTH CARE EDUCATION/TRAINING PROGRAM

## 2022-09-24 PROCEDURE — G0378 HOSPITAL OBSERVATION PER HR: HCPCS

## 2022-09-24 PROCEDURE — 85025 COMPLETE CBC W/AUTO DIFF WBC: CPT

## 2022-09-24 PROCEDURE — 80048 BASIC METABOLIC PNL TOTAL CA: CPT

## 2022-09-24 PROCEDURE — 6370000000 HC RX 637 (ALT 250 FOR IP): Performed by: STUDENT IN AN ORGANIZED HEALTH CARE EDUCATION/TRAINING PROGRAM

## 2022-09-24 PROCEDURE — 36415 COLL VENOUS BLD VENIPUNCTURE: CPT

## 2022-09-24 PROCEDURE — 6360000002 HC RX W HCPCS: Performed by: STUDENT IN AN ORGANIZED HEALTH CARE EDUCATION/TRAINING PROGRAM

## 2022-09-24 RX ADMIN — SODIUM CHLORIDE, PRESERVATIVE FREE 10 ML: 5 INJECTION INTRAVENOUS at 00:05

## 2022-09-24 RX ADMIN — SODIUM CHLORIDE, PRESERVATIVE FREE 10 ML: 5 INJECTION INTRAVENOUS at 19:49

## 2022-09-24 RX ADMIN — CLOPIDOGREL BISULFATE 75 MG: 75 TABLET ORAL at 10:20

## 2022-09-24 RX ADMIN — SODIUM CHLORIDE, PRESERVATIVE FREE 10 ML: 5 INJECTION INTRAVENOUS at 07:54

## 2022-09-24 RX ADMIN — ASPIRIN 81 MG 81 MG: 81 TABLET ORAL at 10:25

## 2022-09-24 RX ADMIN — ACETAMINOPHEN 650 MG: 325 TABLET ORAL at 07:46

## 2022-09-24 RX ADMIN — INSULIN GLARGINE 20 UNITS: 100 INJECTION, SOLUTION SUBCUTANEOUS at 19:50

## 2022-09-24 RX ADMIN — PANTOPRAZOLE SODIUM 20 MG: 20 TABLET, DELAYED RELEASE ORAL at 10:19

## 2022-09-24 RX ADMIN — ENOXAPARIN SODIUM 40 MG: 100 INJECTION SUBCUTANEOUS at 10:23

## 2022-09-24 RX ADMIN — CARVEDILOL 12.5 MG: 6.25 TABLET, FILM COATED ORAL at 18:28

## 2022-09-24 RX ADMIN — CARVEDILOL 12.5 MG: 6.25 TABLET, FILM COATED ORAL at 07:46

## 2022-09-24 RX ADMIN — ATORVASTATIN CALCIUM 40 MG: 40 TABLET, FILM COATED ORAL at 19:49

## 2022-09-24 RX ADMIN — FLUOXETINE HYDROCHLORIDE 80 MG: 20 CAPSULE ORAL at 10:13

## 2022-09-24 RX ADMIN — LOSARTAN POTASSIUM 100 MG: 100 TABLET, FILM COATED ORAL at 07:45

## 2022-09-24 ASSESSMENT — PAIN SCALES - WONG BAKER
WONGBAKER_NUMERICALRESPONSE: 0

## 2022-09-24 ASSESSMENT — PAIN DESCRIPTION - LOCATION
LOCATION: HEAD
LOCATION: HEAD

## 2022-09-24 ASSESSMENT — PAIN SCALES - GENERAL
PAINLEVEL_OUTOF10: 5
PAINLEVEL_OUTOF10: 5

## 2022-09-24 ASSESSMENT — PAIN DESCRIPTION - DESCRIPTORS: DESCRIPTORS: ACHING

## 2022-09-24 ASSESSMENT — PAIN - FUNCTIONAL ASSESSMENT: PAIN_FUNCTIONAL_ASSESSMENT: ACTIVITIES ARE NOT PREVENTED

## 2022-09-24 NOTE — PROGRESS NOTES
Patient BP elevated this AM - gave BP meds and PRN tylenol for a headache. Patient having difficulty swallowing - meds were given crushed in applesauce. Patient ate breakfast this morning, denies any other needs at this time. 1034 - Assisted patient to the bathroom via walker and back to chair. Patient is in chair resting and denies further needs at this time     1209 - In good spirits sitting in chair eating lunch.  Is not having any  swallowing issue eating his meal at this time

## 2022-09-24 NOTE — PROGRESS NOTES
Speech Language Pathology  Facility/Department: [address] [place] [address]   CLINICAL BEDSIDE SWALLOW EVALUATION    NAME: Karley Pierson  : 1980  MRN: [id number]    IMPRESSIONS AND RECOMMENDATIONS: Karley Pierson was referred for a bedside swallow evaluation following admission to [facility] with chest pain, ACS r/o. Pt further reports pain and difficulty swallowing pills, liquids, and foods. He reports sensation of general weakness and dry throat. He reports poor appetite and poor oral intake for the past 2 weeks. Medical hx includes DM, s/p R BKA, HTN. No known history of dysphagia prior to admission. Pt seen for evaluation seated upright in chair, alert, cooperative. Oral mechanism examination WFL without asymmetry. Pt presented with PO trials of thin liquids via cup/straw, puree, and regular solids. Oral stage WFL with intact labial seal, mastication, AP transit, oral clearance. Pharyngeal stage WFL with adequate swallow initiation/laryngeal elevation and no s/s aspiration across all trials. Pt reported pain and discomfort when swallowing all consistencies throughout. Recommend regular diet/thin liquids with pt to self-select most comfortable foods/textures as needed. No further acute SLP needs identified. ADMISSION DATE: 2022  ADMITTING DIAGNOSIS: has Unstable angina ([address] Lumbar back pain with radiculopathy affecting left lower extremity; S/P lumbar laminectomy; Type 2 diabetes mellitus, with long-term current use of insulin ([address] Essential hypertension; Gastroesophageal reflux disease without esophagitis; Chest pain; Moderate nonproliferative diabetic retinopathy of both eyes without macular edema associated with type 2 diabetes mellitus ([address] Acute osteomyelitis ([address] Acute osteomyelitis of foot ([address] Abscess of left foot; Cellulitis of left foot; Subacute osteomyelitis of left foot ([address] Acute blood loss anemia;  Diabetic ulcer of left midfoot associated with type 2 diabetes mellitus, with muscle involvement without evidence of necrosis ([address] Anxiety attack; WD-Diabetic ulcer of left midfoot associated with type 2 diabetes mellitus, with fat layer exposed ([address] Diabetic ulcer of toe of left foot associated with type 2 diabetes mellitus, with fat layer exposed ([address] WD-Partial nontraumatic amputation of foot, left ([address] Severe episode of recurrent major depressive disorder, without psychotic features ([address] Insomnia; Intractable vomiting with nausea; Intractable nausea and vomiting; Necrotizing fasciitis ([address] Sepsis ([address] Pneumatosis coli; Type 2 diabetes mellitus with right diabetic foot infection ([address] Necrotizing fasciitis of lower leg ([address] Below knee amputation ([address] Status post below knee amputation, left ([address] Uncontrolled pain; Generalized weakness; Uncontrolled type 2 diabetes mellitus with peripheral neuropathy ([address]  Depression with anxiety; WD-Below-knee amputation ([address] and WD-Encounter for orthopedic aftercare following surgical amputation on their problem list.  ONSET DATE: this admission    Recent Chest Xray/CT of Chest: see chart    Date of Eval: 9/24/2022  Evaluating Therapist: IZAIAH Champagne    Current Diet level:  Current Diet : Soft and Bite-Sized      Primary Complaint  Patient Complaint: poor appetite, odynophagia, feeling of weakness    Pain:  Pain Assessment  Pain Assessment: 0-10  Pain Level: 5  Blair-Baker Pain Rating: No hurt  Patient's Stated Pain Goal: 0 - No pain  Pain Location: Head  Pain Orientation: Right, Left  Pain Descriptors: Aching  Functional Pain Assessment: Activities are not prevented  Non-Pharmaceutical Pain Intervention(s): Emotional support  Response to Pain Intervention: Patient satisfied  Side Effects: No reported side effects    Reason for Referral  Buffy Pederson was referred for a bedside swallow evaluation to assess the efficiency of his swallow function, identify signs and symptoms of aspiration and make recommendations regarding safe dietary consistencies, effective compensatory strategies, and safe eating environment. Impression  Dysphagia Diagnosis: Swallow function appears WFL;No clinical indicators of dysphagia  Dysphagia Outcome Severity Scale: Level 6: Within functional limits/Modified independence     Treatment Plan  Requires SLP Intervention: No  Duration of Treatment: N/A  D/C Recommendations: No follow up therapy recommended post discharge       Recommended Diet and Intervention        Recommended Form of Meds: PO          Compensatory Swallowing Strategies  Compensatory Swallowing Strategies : Upright as possible for all oral intake; Alternate solids and liquids    Treatment/Goals  Short-term Goals  Timeframe for Short-term Goals: N/A    General  Chart Reviewed: Yes  Behavior/Cognition: Alert; Cooperative  Respiratory Status: Room air  O2 Device: None (Room air)  Communication Observation: Functional  Follows Directions: Simple  Dentition: Some missing teeth  Patient Positioning: Upright in chair  Baseline Vocal Quality: Normal  Prior Dysphagia History: none known prior to admission  Consistencies Administered: Regular;Pureed; Thin - cup; Thin - straw           Vision/Hearing  Vision  Vision: Within Functional Limits  Hearing  Hearing: Within functional limits    Oral Motor Deficits  Oral/Motor  Oral Hygiene: Moist;Clean    Oral Phase Dysfunction  Oral Phase  Oral Phase: WFL     Indicators of Pharyngeal Phase Dysfunction        Prognosis  Individuals consulted  Consulted and agree with results and recommendations: Patient    Education  Patient Education: results, recommendations  Patient Education Response: Verbalizes understanding  Safety Devices in place: Yes  Type of devices: Left in chair       Therapy Time  SLP Individual Minutes  Time In: 0930  Time Out: 0945  Minutes: David [address] [place] [address] Road  9/24/2022 10:24 AM

## 2022-09-24 NOTE — CONSULTS
[address] [place] [address], [address] [place] [address]                                  CONSULTATION    PATIENT NAME: Marquis Laguerre                      :        1980  MED REC NO:   [id number]                          ROOM:       4019  ACCOUNT NO:   [de-identified]                           ADMIT DATE: 2022  PROVIDER:     PERCY Pathak    CONSULT DATE:  2022    CHIEF COMPLAINT:  Chest pain. HISTORY OF PRESENT ILLNESS:  A 27-year-old male with a past medical  history of hypertension, diabetes, PAD status post right below-the-knee  amputation. In the last 3-5 days, he has not been feeling very well  overall. He has been having more loss of appetite, not able to eat,  pain in the epigastric and sternal area. He has been having increased  shortness of breath and cough. He states that the chest pain is a  sharp, stabbing pain that comes and goes multiple times over the last 3  days. It does go away for a period of time, but then will come back  again. It is nonexertional, can be pleuritic at times, does not appear  to be associated with anything that he eats per the patient. It is  worse when he presses on his chest with his hands. He does have several  CAD risk factors. He follows actively with Dr. Anshul Howard, outpatient. He  has been seen by the [facility] here at the hospital as well as Dr. Anshul Howard who has done a heart cath for him back in 2018. However, he  actively follows with Dr. Anshul Howard as outpatient and last saw him at his  office about 6-8 months ago, per the patient. As he follows with Dr. Anshul Howard, we usually see this patient for him here at the office. Dr. Anshul Howard is actually working with us tomorrow and will be _____ in the  hospital.  Asked the nursing staff can notify the [facility] that he  follows with Dr. Anshul Howard and so he will be seeing him tomorrow. At this  time, he is sitting up in bed and is fairly comfortable. During my  exam, he did have an episode of acute onset shortness of breath that  lasted for about 10-15 seconds where he had a coughing fit. He was able  to cough it out and was feeling much better afterwards. States that  this has happened sometimes when he is drinking. He had attempted to  drink right before this episode happened as well. He did not have any  chest pain with any episode per patient. PAST MEDICAL HISTORY:  Left foot abscess and infection, cellulitis,  leading to amputation. Dizziness, hypertension, hyperlipidemia. PAST SURGICAL HISTORY:  Cardiac cath in 2018 that was negative for any  significant CAD, no stent was placed at that time. [facility] tooth  extraction, Achilles tendon surgery, hernia repair, toe amputation and  leg amputation below the knee on the right side in 09/2022. ALLERGIES:  No known drug allergies. FAMILY HISTORY:  Diabetes, heart attack in his mother. His mother had  severe mitral valve regurgitation and required surgery as well as bypass  surgery at that time. This was done last year sometime. SOCIAL HISTORY:  Does not smoke. Does drink alcohol occasionally. HOME MEDICATIONS:  He was on oxycodone, Colace, aspirin 81 mg daily,  Protonix, glyburide, Toprol 25 mg daily, Cozaar 25 mg daily, multiple  insulins. REVIEW OF SYSTEMS:  A 10-point review of systems is reviewed and is  negative unless noted in the HPI. PHYSICAL EXAMINATION:  GENERAL:  Awake, alert male, sitting up in bed, in no acute distress. HEENT:  Head:  Atraumatic, normocephalic. Eyes:  No scleral erythema,  discharge, or conjunctivitis noted. NECK:  Trachea is midline. No apparent mass. CARDIAC:  Regular rate and rhythm. No murmurs, rubs or gallops  auscultated. LUNGS:  Clear to auscultation bilaterally. Good rise and fall of the  chest.  ABDOMEN:  Soft, nontender. MUSCULOSKELETAL:  No obvious joint deformities. He does have tenderness  to the chest on palpation.   SKIN:  No erythema or ecchymosis noted. NEUROLOGIC:  Alert and oriented x4. PSYCH:  Normal mood and affect. IMAGING:  Chest x-ray was done that showed no acute cardiopulmonary  process. EKG was done that showed normal sinus rhythm with T-wave  inversions in V1 and V2, but this is stable from his previous. No  significant ST changes. Last echo was done 04/19/2022 and it showed the  EF was preserved at 55% with no significant valvular heart disease. Last left heart catheterization was done in 03/2021 and it showed normal  coronaries and normal left ventricular ejection fraction. Last stress  test was done in 01/2021 as well and it did not show any ischemia. LABORATORY DATA:  Troponin is elevated at 0.016. He has had history of  elevated troponin before, usually around this range and it trends back  down to normal.  BMP within normal limits, LFTs within normal limits and  CBC within normal limits. IMPRESSION:  A 51-year-old male that comes in with atypical chest pain  and shortness of breath, can be associated with dysphagia at times. Troponin is mildly elevated. EKG is benign. He had a heart cath that  was done last year and an echo that was done earlier this year. Both of  them were reassuring as well. He follows with Dr. Aldair Reno as outpatient. Dr. Aldair Reno is working with us tomorrow and will be here at the hospital  to evaluate the patient. At this time, as his blood pressure is high,  we will optimize his cardioprotective medications to and follow his  blood pressure. We would trend troponin for him to rule out signs of  any ACS. Questioning of a GI consult would be in order due to the  episode of dysphagia that he had on my exam.  Further treatment dictated  by hospital course.     AGREE WITH ABOVE     PERCY Lewis    D: 09/23/2022 17:45:47       T: 09/23/2022 17:50:12     [email or web address]  Job#: [id number]     Doc#: [id number]    CC:  Ofe Blakely MD

## 2022-09-24 NOTE — PROGRESS NOTES
Daily Progress Note  Subjective:  Patient awake, alert, doing ok   Denies CP or SOB at this time  SR on tele, HR and BP stable    Attending Note:      Impression and Plan:   Atypical chest pain     Reproducible     Troponin mildly elevated, similar to previous      EKG showed no acute ischemic changes      Last cath 3/2021 negative     Last echo 4/2022 showed EF 55%     Optimize cardioprotective meds      Consider LHC if continues to have pain     Hypertension     Continue coreg, losartan     Most Recent Echo  4/2022   Summary   Left ventricular systolic function is normal.   Ejection fraction is visually estimated at 55%. No significant valvular disease noted. No evidence of any pericardial effusion. Most Recent Stress test  1/2021  Summary    Normal LV function. NORMAL EDV    There is normal isotope uptake following exercise and at rest. There is no    evidence of exercise induced ischemia. This is a normal study. Recommendation    Recommendation: Routine follow-up.        Most Recent Heart Cath  3/2021  Procedure Summary   normal coronaries and normal LVEF    Recommendations   Optimize medications      Signatures    --------------------------------------------------------   Electronically signed by Nakul Adorno MD (Performing Physician) on 03/12/2021 at 16:48  Radiology    Objective:   BP (!) 158/105   Pulse 77   Temp 97.8 °F (36.6 °C)   Resp 16   Ht 6' (1.829 m)   Wt 170 lb (77.1 kg)   SpO2 98%   BMI 23.06 kg/m²     Intake/Output Summary (Last 24 hours) at 9/24/2022 3537  Last data filed at 9/24/2022 1002  Gross per 24 hour   Intake 360 ml   Output --   Net 360 ml       Medications:   Scheduled Meds:   aspirin  81 mg Oral Daily    atorvastatin  40 mg Oral Nightly    FLUoxetine  80 mg Oral Daily    pantoprazole  20 mg Oral QAM AC    insulin glargine  20 Units SubCUTAneous Nightly    insulin lispro  0-4 Units SubCUTAneous TID WC    insulin lispro  0-4 Units SubCUTAneous Nightly    sodium chloride flush  5-40 mL IntraVENous 2 times per day    enoxaparin  40 mg SubCUTAneous Daily    losartan  100 mg Oral Daily    carvedilol  12.5 mg Oral BID WC    clopidogrel  75 mg Oral Daily      Infusions:   dextrose      sodium chloride        PRN Meds:  glucose, dextrose bolus **OR** dextrose bolus, glucagon (rDNA), dextrose, sodium chloride flush, sodium chloride, ondansetron **OR** ondansetron, polyethylene glycol, acetaminophen **OR** acetaminophen     Physical Exam:  Vitals:    09/24/22 0802   BP:    Pulse: 77   Resp: 16   Temp:    SpO2: 98%        General: AAO, NAD  Chest: Nontender  Cardiac: First and Second Heart Sounds are Normal, No Murmurs or Gallops noted  Lungs:Clear to auscultation and percussion. Abdomen: Soft, NT, ND, +BS  Extremities: No clubbing, right BKA  Vascular:  Equal 2+ peripheral pulses. Lab Data:  CBC:   Recent Labs     09/23/22  0630 09/24/22  0314   WBC 7.7 8.6   HGB 13.6 11.1*   HCT 42.6 35.7*   MCV 89.5 92.0   * 508*     BMP:   Recent Labs     09/23/22  1030 09/24/22  0314    138   K 4.2 4.0    102   CO2 23 26   BUN 10 15   CREATININE 1.1 1.5*     LIVER PROFILE:   Recent Labs     09/23/22  1030   AST 20   ALT 21   LIPASE 25   BILITOT 0.5   ALKPHOS 84     PT/INR: No results for input(s): PROTIME, INR in the last 72 hours. APTT: No results for input(s): APTT in the last 72 hours. BNP:  No results for input(s): BNP in the last 72 hours.       Assessment:  Patient Active Problem List    Diagnosis Date Noted    WD-Below-knee amputation ([facility] 09/20/2022    WD-Encounter for orthopedic aftercare following surgical amputation 09/20/2022    Status post below knee amputation, left ([facility] 09/10/2022    Uncontrolled pain 09/10/2022    Generalized weakness 09/10/2022    Uncontrolled type 2 diabetes mellitus with peripheral neuropathy ([facility] 09/10/2022    Depression with anxiety 09/10/2022    Below knee amputation ([facility] 09/08/2022    Necrotizing fasciitis ([address] 09/03/2022    Sepsis ([address] 09/03/2022    Pneumatosis coli 09/03/2022    Type 2 diabetes mellitus with right diabetic foot infection ([address] 09/03/2022    Necrotizing fasciitis of lower leg ([address] 09/03/2022    Intractable nausea and vomiting 09/01/2022    Intractable vomiting with nausea 08/28/2022    Insomnia 07/01/2022    Severe episode of recurrent major depressive disorder, without psychotic features ([address] 06/30/2022    WD-Diabetic ulcer of left midfoot associated with type 2 diabetes mellitus, with fat layer exposed ([address] 06/15/2022    Diabetic ulcer of toe of left foot associated with type 2 diabetes mellitus, with fat layer exposed ([address] 06/15/2022    WD-Partial nontraumatic amputation of foot, left ([address] 06/15/2022    Anxiety attack 05/21/2022    Acute blood loss anemia 04/26/2022    Diabetic ulcer of left midfoot associated with type 2 diabetes mellitus, with muscle involvement without evidence of necrosis ([address] 04/26/2022    Abscess of left foot 04/22/2022    Cellulitis of left foot 04/22/2022    Subacute osteomyelitis of left foot ([address] 04/22/2022    Acute osteomyelitis ([address] 04/18/2022    Acute osteomyelitis of foot (HCC)     Moderate nonproliferative diabetic retinopathy of both eyes without macular edema associated with type 2 diabetes mellitus ([address] 01/10/2022    Chest pain 03/12/2021    Gastroesophageal reflux disease without esophagitis 05/11/2020    Type 2 diabetes mellitus, with long-term current use of insulin ([address]     Essential hypertension     S/P lumbar laminectomy 04/18/2018    Lumbar back pain with radiculopathy affecting left lower extremity 04/17/2018    Unstable angina ([address] 03/13/2018       Electronically signed by RAFAEL Singh CNP on 9/24/2022 at 2:57 PM

## 2022-09-24 NOTE — PROGRESS NOTES
Comprehensive Nutrition Assessment    Type and Reason for Visit:  Initial, Positive Nutrition Screen (reported weight loss, reduced intake)    Nutrition Recommendations/Plan:   Resume carb controlled diet   Diet consistency as per speech therapy   Will offer diabetic oral nutrition supplement with meals  Will continue to follow up during stay      Malnutrition Assessment:  Malnutrition Status: At risk for malnutrition (Comment) (09/24/22 1002)    Context:  Social/Environmental Circumstances       Nutrition Assessment:    Admit with shortness of breath, chest pain. Recent hospital asmit and rehab stay s/p BKA. Currently on soft and bite sized diet with c/o swallowing issues. Speech therapy consult planned. Patient reports poor intake for past week and not sleeping at all for past 4 days. Requested diabetic oral nutrition supplement. Will follow at moderate nutrition risk at this time. Plan to resume carb controlled diet with consistency as per speech therapy. Nutrition Related Findings:    sitting up in chair, hx DM  glcuose POCT under 200 mg/dL, on recent discharge from ARU case management assisting with getting measls/food for home Wound Type: Surgical Incision (recent BKA)       Current Nutrition Intake & Therapies:    Average Meal Intake: Unable to assess  Average Supplements Intake: None Ordered  ADULT DIET; Dysphagia - Soft and Bite Sized    Anthropometric Measures:  Height: 6' (182.9 cm)  Ideal Body Weight (IBW): 178 lbs (81 kg)       Current Body Weight: 169 lb 15.6 oz (77.1 kg), 95.5 % IBW.  Weight Source: Stated  Current BMI (kg/m2): 23  Usual Body Weight: 173 lb 1 oz (78.5 kg) (rehab stay this month)  % Weight Change (Calculated): -1.8  Weight Adjustment For: Amputation  Total Adjusted Percentage (Calculated): 5.9  Adjusted Ideal Body Weight (lbs) (Calculated): 167.5 lbs  Adjusted Ideal Body Weight (kg) (Calculated): 76.14 kg  Adjusted % Ideal Body Weight (Calculated): 101.5  Adjusted BMI (kg/m2) (Calculated): 24.4  BMI Categories: Normal Weight (BMI 18.5-24. 9)    Estimated Daily Nutrient Needs:  Energy Requirements Based On: Kcal/kg  Weight Used for Energy Requirements: Current  Energy (kcal/day): 5680-9281  Weight Used for Protein Requirements: Current  Protein (g/day):  (1.2-1.4 g/kg)  Method Used for Fluid Requirements: 1 ml/kcal  Fluid (ml/day): 2000    Nutrition Diagnosis:   Inadequate oral intake related to other (comment) (reduced appetite) as evidenced by poor intake prior to admission    Nutrition Interventions:   Food and/or Nutrient Delivery: Modify Current Diet, Start Oral Nutrition Supplement  Nutrition Education/Counseling: No recommendation at this time  Coordination of Nutrition Care: Continue to monitor while inpatient, Coordination of Care, Speech Therapy  Plan of Care discussed with: patient    Goals:     Goals: PO intake 75% or greater, by next RD assessment       Nutrition Monitoring and Evaluation:   Behavioral-Environmental Outcomes: None Identified  Food/Nutrient Intake Outcomes: Food and Nutrient Intake, Diet Advancement/Tolerance, Supplement Intake  Physical Signs/Symptoms Outcomes: Biochemical Data, Chewing or Swallowing, Meal Time Behavior, Skin, Weight    Discharge Planning:    Continue current diet, Continue Oral Nutrition Supplement, Coordination of community care     Ruby Strong RD, LD  Contact: [phone]

## 2022-09-24 NOTE — PROGRESS NOTES
no discharge  HENT: NCAT  Cardiovascular: RRR  Respiratory: Clear to auscultation   Gastrointestinal: Soft, non tender, nondistended  Genitourinary: no suprapubic tenderness  Musculoskeletal: No edema, nontender, right bka  Skin: warm, dry, no gross lesions  Neuro: Alert. No gross deficits  Psych: Mood appropriate. Medications:   Medications:    aspirin  81 mg Oral Daily    atorvastatin  40 mg Oral Nightly    FLUoxetine  80 mg Oral Daily    pantoprazole  20 mg Oral QAM AC    insulin glargine  20 Units SubCUTAneous Nightly    insulin lispro  0-4 Units SubCUTAneous TID WC    insulin lispro  0-4 Units SubCUTAneous Nightly    sodium chloride flush  5-40 mL IntraVENous 2 times per day    enoxaparin  40 mg SubCUTAneous Daily    losartan  100 mg Oral Daily    carvedilol  12.5 mg Oral BID WC    clopidogrel  75 mg Oral Daily      Infusions:    dextrose      sodium chloride       PRN Meds: glucose, 4 tablet, PRN  dextrose bolus, 125 mL, PRN   Or  dextrose bolus, 250 mL, PRN  glucagon (rDNA), 1 mg, PRN  dextrose, , Continuous PRN  sodium chloride flush, 5-40 mL, PRN  sodium chloride, , PRN  ondansetron, 4 mg, Q8H PRN   Or  ondansetron, 4 mg, Q6H PRN  polyethylene glycol, 17 g, Daily PRN  acetaminophen, 650 mg, Q6H PRN   Or  acetaminophen, 650 mg, Q6H PRN        Labs      Recent Labs     09/23/22  0630 09/24/22  0314   WBC 7.7 8.6   HGB 13.6 11.1*   HCT 42.6 35.7*   * 508*      Recent Labs     09/23/22  1030 09/24/22  0314    138   K 4.2 4.0    102   CO2 23 26   BUN 10 15   CREATININE 1.1 1.5*     Recent Labs     09/23/22  1030   AST 20   ALT 21   BILITOT 0.5   ALKPHOS 84     No results for input(s): INR in the last 72 hours.   Recent Labs     09/23/22  1030 09/24/22  0314   TROPONINT 0.016* 0.021*     Lab Results   Component Value Date/Time    LABA1C 10.5 07/28/2022 10:45 AM     CALCIUM:  8.7/26 (09/24 0314)  Lab Results   Component Value Date/Time    MG 1.9 08/29/2022 08:00 AM           Electronically signed by César Resendez MD on 9/24/2022 at 3:25 PM

## 2022-09-25 VITALS
HEIGHT: 72 IN | TEMPERATURE: 98.8 F | WEIGHT: 170 LBS | BODY MASS INDEX: 23.03 KG/M2 | HEART RATE: 77 BPM | DIASTOLIC BLOOD PRESSURE: 89 MMHG | RESPIRATION RATE: 16 BRPM | SYSTOLIC BLOOD PRESSURE: 137 MMHG | OXYGEN SATURATION: 98 %

## 2022-09-25 LAB
ANION GAP SERPL CALCULATED.3IONS-SCNC: 13 MMOL/L (ref 4–16)
BUN BLDV-MCNC: 16 MG/DL (ref 6–23)
CALCIUM SERPL-MCNC: 8.7 MG/DL (ref 8.3–10.6)
CHLORIDE BLD-SCNC: 96 MMOL/L (ref 99–110)
CO2: 21 MMOL/L (ref 21–32)
CREAT SERPL-MCNC: 1.6 MG/DL (ref 0.9–1.3)
GFR AFRICAN AMERICAN: 58 ML/MIN/1.73M2
GFR NON-AFRICAN AMERICAN: 48 ML/MIN/1.73M2
GLUCOSE BLD-MCNC: 170 MG/DL (ref 70–99)
GLUCOSE BLD-MCNC: 203 MG/DL (ref 70–99)
GLUCOSE BLD-MCNC: 205 MG/DL (ref 70–99)
POTASSIUM SERPL-SCNC: 4.7 MMOL/L (ref 3.5–5.1)
SODIUM BLD-SCNC: 130 MMOL/L (ref 135–145)

## 2022-09-25 PROCEDURE — 6370000000 HC RX 637 (ALT 250 FOR IP): Performed by: INTERNAL MEDICINE

## 2022-09-25 PROCEDURE — 6370000000 HC RX 637 (ALT 250 FOR IP): Performed by: STUDENT IN AN ORGANIZED HEALTH CARE EDUCATION/TRAINING PROGRAM

## 2022-09-25 PROCEDURE — 82962 GLUCOSE BLOOD TEST: CPT

## 2022-09-25 PROCEDURE — G0378 HOSPITAL OBSERVATION PER HR: HCPCS

## 2022-09-25 PROCEDURE — 96372 THER/PROPH/DIAG INJ SC/IM: CPT

## 2022-09-25 PROCEDURE — 6360000002 HC RX W HCPCS: Performed by: STUDENT IN AN ORGANIZED HEALTH CARE EDUCATION/TRAINING PROGRAM

## 2022-09-25 PROCEDURE — 2580000003 HC RX 258: Performed by: STUDENT IN AN ORGANIZED HEALTH CARE EDUCATION/TRAINING PROGRAM

## 2022-09-25 PROCEDURE — 80048 BASIC METABOLIC PNL TOTAL CA: CPT

## 2022-09-25 PROCEDURE — 36415 COLL VENOUS BLD VENIPUNCTURE: CPT

## 2022-09-25 RX ORDER — CLOPIDOGREL BISULFATE 75 MG/1
75 TABLET ORAL DAILY
Qty: 30 TABLET | Refills: 0 | Status: SHIPPED | OUTPATIENT
Start: 2022-09-26

## 2022-09-25 RX ORDER — LOSARTAN POTASSIUM 100 MG/1
100 TABLET ORAL DAILY
Qty: 30 TABLET | Refills: 0 | Status: SHIPPED | OUTPATIENT
Start: 2022-09-26

## 2022-09-25 RX ORDER — CARVEDILOL 12.5 MG/1
12.5 TABLET ORAL 2 TIMES DAILY WITH MEALS
Qty: 60 TABLET | Refills: 0 | Status: SHIPPED | OUTPATIENT
Start: 2022-09-25

## 2022-09-25 RX ADMIN — SODIUM CHLORIDE, PRESERVATIVE FREE 10 ML: 5 INJECTION INTRAVENOUS at 11:04

## 2022-09-25 RX ADMIN — ENOXAPARIN SODIUM 40 MG: 100 INJECTION SUBCUTANEOUS at 11:01

## 2022-09-25 RX ADMIN — FLUOXETINE HYDROCHLORIDE 80 MG: 20 CAPSULE ORAL at 11:00

## 2022-09-25 RX ADMIN — INSULIN LISPRO 1 UNITS: 100 INJECTION, SOLUTION INTRAVENOUS; SUBCUTANEOUS at 13:53

## 2022-09-25 RX ADMIN — ASPIRIN 81 MG 81 MG: 81 TABLET ORAL at 11:00

## 2022-09-25 RX ADMIN — LOSARTAN POTASSIUM 100 MG: 100 TABLET, FILM COATED ORAL at 11:00

## 2022-09-25 RX ADMIN — PANTOPRAZOLE SODIUM 20 MG: 20 TABLET, DELAYED RELEASE ORAL at 06:41

## 2022-09-25 RX ADMIN — CLOPIDOGREL BISULFATE 75 MG: 75 TABLET ORAL at 11:00

## 2022-09-25 RX ADMIN — CARVEDILOL 12.5 MG: 6.25 TABLET, FILM COATED ORAL at 11:00

## 2022-09-25 ASSESSMENT — PAIN SCALES - WONG BAKER
WONGBAKER_NUMERICALRESPONSE: 0

## 2022-09-25 NOTE — PROGRESS NOTES
Daily Progress Note  Subjective:  Awake, alert, feeling well   Wants to go home   Denies CP or SOB   SR on tele, HR and BP stable     Stable for discharge from cardiac stand     Attending Note:      Impression and Plan:   Atypical chest pain     Reproducible     Troponin mildly elevated, similar to previous      EKG showed no acute ischemic changes      Last cath 3/2021 negative     Last echo 4/2022 showed EF 55%     Optimize cardioprotective meds       Discharge home with outpt follow up      Hypertension     Continue coreg, losartan      Most Recent Echo  4/2022   Summary   Left ventricular systolic function is normal.   Ejection fraction is visually estimated at 55%. No significant valvular disease noted. No evidence of any pericardial effusion. Most Recent Stress test  1/2021  Summary    Normal LV function. NORMAL EDV    There is normal isotope uptake following exercise and at rest. There is no    evidence of exercise induced ischemia. This is a normal study. Recommendation    Recommendation: Routine follow-up.          Most Recent Heart Cath  3/2021  Procedure Summary   normal coronaries and normal LVEF    Recommendations   Optimize medications         Objective:   /88   Pulse 78   Temp 98.8 °F (37.1 °C)   Resp 16   Ht 6' (1.829 m)   Wt 170 lb (77.1 kg)   SpO2 98%   BMI 23.06 kg/m²     Intake/Output Summary (Last 24 hours) at 9/25/2022 0845  Last data filed at 9/24/2022 1745  Gross per 24 hour   Intake 510 ml   Output --   Net 510 ml       Medications:   Scheduled Meds:   aspirin  81 mg Oral Daily    atorvastatin  40 mg Oral Nightly    FLUoxetine  80 mg Oral Daily    pantoprazole  20 mg Oral QAM AC    insulin glargine  20 Units SubCUTAneous Nightly    insulin lispro  0-4 Units SubCUTAneous TID WC    insulin lispro  0-4 Units SubCUTAneous Nightly    sodium chloride flush  5-40 mL IntraVENous 2 times per day    enoxaparin  40 mg SubCUTAneous Daily    losartan  100 mg Oral Daily carvedilol  12.5 mg Oral BID WC    clopidogrel  75 mg Oral Daily      Infusions:   dextrose      sodium chloride        PRN Meds:  glucose, dextrose bolus **OR** dextrose bolus, glucagon (rDNA), dextrose, sodium chloride flush, sodium chloride, ondansetron **OR** ondansetron, polyethylene glycol, acetaminophen **OR** acetaminophen     Physical Exam:  Vitals:    09/25/22 0254   BP: 138/88   Pulse: 78   Resp:    Temp: 98.8 °F (37.1 °C)   SpO2:         General: AAO, NAD  Chest: Nontender  Cardiac: First and Second Heart Sounds are Normal, No Murmurs or Gallops noted  Lungs:Clear to auscultation and percussion. Abdomen: Soft, NT, ND, +BS  Extremities: No clubbing, no edema. Right BKA   Vascular:  Equal 2+ peripheral pulses. Lab Data:  CBC:   Recent Labs     09/23/22  0630 09/24/22  0314   WBC 7.7 8.6   HGB 13.6 11.1*   HCT 42.6 35.7*   MCV 89.5 92.0   * 508*     BMP:   Recent Labs     09/23/22  1030 09/24/22  0314    138   K 4.2 4.0    102   CO2 23 26   BUN 10 15   CREATININE 1.1 1.5*     LIVER PROFILE:   Recent Labs     09/23/22  1030   AST 20   ALT 21   LIPASE 25   BILITOT 0.5   ALKPHOS 84     PT/INR: No results for input(s): PROTIME, INR in the last 72 hours. APTT: No results for input(s): APTT in the last 72 hours. BNP:  No results for input(s): BNP in the last 72 hours.       Assessment:  Patient Active Problem List    Diagnosis Date Noted    WD-Below-knee amputation ([facility] 09/20/2022    WD-Encounter for orthopedic aftercare following surgical amputation 09/20/2022    Status post below knee amputation, left ([facility] 09/10/2022    Uncontrolled pain 09/10/2022    Generalized weakness 09/10/2022    Uncontrolled type 2 diabetes mellitus with peripheral neuropathy ([facility] 09/10/2022    Depression with anxiety 09/10/2022    Below knee amputation ([facility] 09/08/2022    Necrotizing fasciitis ([facility] 09/03/2022    Sepsis ([facility] 09/03/2022    Pneumatosis coli 09/03/2022    Type 2 diabetes mellitus with right diabetic foot infection ([address] 09/03/2022    Necrotizing fasciitis of lower leg ([address] 09/03/2022    Intractable nausea and vomiting 09/01/2022    Intractable vomiting with nausea 08/28/2022    Insomnia 07/01/2022    Severe episode of recurrent major depressive disorder, without psychotic features ([address] 06/30/2022    WD-Diabetic ulcer of left midfoot associated with type 2 diabetes mellitus, with fat layer exposed ([address] 06/15/2022    Diabetic ulcer of toe of left foot associated with type 2 diabetes mellitus, with fat layer exposed ([address] 06/15/2022    WD-Partial nontraumatic amputation of foot, left ([address] 06/15/2022    Anxiety attack 05/21/2022    Acute blood loss anemia 04/26/2022    Diabetic ulcer of left midfoot associated with type 2 diabetes mellitus, with muscle involvement without evidence of necrosis ([address] 04/26/2022    Abscess of left foot 04/22/2022    Cellulitis of left foot 04/22/2022    Subacute osteomyelitis of left foot ([address] 04/22/2022    Acute osteomyelitis ([address] 04/18/2022    Acute osteomyelitis of foot (HCC)     Moderate nonproliferative diabetic retinopathy of both eyes without macular edema associated with type 2 diabetes mellitus ([address] 01/10/2022    Chest pain 03/12/2021    Gastroesophageal reflux disease without esophagitis 05/11/2020    Type 2 diabetes mellitus, with long-term current use of insulin ([address]     Essential hypertension     S/P lumbar laminectomy 04/18/2018    Lumbar back pain with radiculopathy affecting left lower extremity 04/17/2018    Unstable angina ([address] 03/13/2018       Electronically signed by RAFAEL Pan CNP on 9/25/2022 at 8:45 AM

## 2022-09-25 NOTE — DISCHARGE INSTRUCTIONS
Medications: see computerized discharge medication list  Activity: activity as tolerated and no driving for today  Diet: diabetic diet   Disposition: home  Discharged Condition: Stable

## 2022-09-25 NOTE — PLAN OF CARE
Problem: Discharge Planning  Goal: Discharge to home or other facility with appropriate resources  Outcome: Progressing  Flowsheets (Taken 9/24/2022 0900 by Kay Lantigua LPN)  Discharge to home or other facility with appropriate resources: Identify barriers to discharge with patient and caregiver     Problem: Safety - Adult  Goal: Free from fall injury  Outcome: Progressing     Problem: Skin/Tissue Integrity  Goal: Absence of new skin breakdown  Description: 1. Monitor for areas of redness and/or skin breakdown  2. Assess vascular access sites hourly  3. Every 4-6 hours minimum:  Change oxygen saturation probe site  4. Every 4-6 hours:  If on nasal continuous positive airway pressure, respiratory therapy assess nares and determine need for appliance change or resting period.   Outcome: Progressing     Problem: ABCDS Injury Assessment  Goal: Absence of physical injury  Outcome: Progressing     Problem: Chronic Conditions and Co-morbidities  Goal: Patient's chronic conditions and co-morbidity symptoms are monitored and maintained or improved  Outcome: Progressing  Flowsheets (Taken 9/24/2022 0900 by Kay Lantigua LPN)  Care Plan - Patient's Chronic Conditions and Co-Morbidity Symptoms are Monitored and Maintained or Improved: Monitor and assess patient's chronic conditions and comorbid symptoms for stability, deterioration, or improvement     Problem: Pain  Goal: Verbalizes/displays adequate comfort level or baseline comfort level  Outcome: Progressing     Problem: Nutrition Deficit:  Goal: Optimize nutritional status  9/24/2022 2242 by Rita Leung RN  Outcome: Progressing  9/24/2022 1005 by Patricia Manzanares RD, LD  Flowsheets (Taken 9/24/2022 1005)  Nutrient intake appropriate for improving, restoring, or maintaining nutritional needs:   Monitor oral intake, labs, and treatment plans   Recommend appropriate diets, oral nutritional supplements, and vitamin/mineral supplements

## 2022-09-25 NOTE — DISCHARGE SUMMARY
V2.0  Discharge Summary    Name:  Hu Woodall /Age/Sex: 1980 (32 y.o. male)   Admit Date: 2022  Discharge Date: 22    MRN & CSN:  [id number] & [id number] Encounter Date and Time 22 1:30 PM EDT    Attending:  Re Linares MD Discharging Provider: Re Linares MD       Hospital Course:     HPI:   Chief Complaint: Chest pain  Hu Woodall is a 39 y.o. male with pmh of hypertension, diabetes mellitus, right BKA, not vaccinated for COVID who presents with not feeling well from 5 days, he states that he was apparently asymptomatic until 5 days ago started having loss of appetite, not eating, started having pain in the epigastric area from 3 days associated with some bloating, flatus started having some shortness of breath overnight so presented to ED. pain in epigastric area on and off no radiation sticking like no aggravating or relieving factors. Denies any chest pain, orthopnea or PND, nausea, vomiting, palpitations, sweating, headache. Problem Based Course:   Patient came in with chest pain. He had an elevated troponin. He was on aspirin, statin, and beta-blocker. His chest pain resolved. He was seen by cardiology who felt that chest pain was atypical and stable. Patient feels that his chest pain may be related to anxiety. Cardiology placed patient on Plavix. Patient had epigastric pain with loss of appetite. There was concern for possible dyspepsia. He was placed on Protonix. Epigastric pain appeared to resolve during the course of his stay. Patient had uncontrolled hypertension on presentation. He appeared to be noncompliant with his medications. His metoprolol was stopped and he was placed on carvedilol for better blood pressure control. His losartan was increased. Patient appeared to have elevated creatinine. It was not clear if this was acute kidney injury versus CKD 3. Elevated creatinine may be due to an increase in losartan.   Creatinine appear to be stabilized. Patient was advised to follow-up with his nephrologist as an outpatient. Patient was positive on urine drug screen for [facility] and oxycodone. Patient was stable. He was discharged home. Consults this admission:  IP CONSULT TO HOSPITALIST  IP CONSULT TO CARDIOLOGY    Discharge Diagnosis:     Chest pain  Epigastric pain with loss of appetite  Uncontrolled hypertension on presentation  DM2  [place], possible CKD  Drug use: THC and oxycodone positive. Right BKA    Discharge Instruction:   Medications: see computerized discharge medication list  Activity: activity as tolerated and no driving for today  Diet: diabetic diet   Disposition: home  Discharged Condition: Stable        Discharge Medications:        Medication List        START taking these medications      carvedilol 12.5 MG tablet  Commonly known as: COREG  Take 1 tablet by mouth 2 times daily (with meals)     clopidogrel 75 MG tablet  Commonly known as: PLAVIX  Take 1 tablet by mouth daily  Start taking on: September 26, 2022            CHANGE how you take these medications      losartan 100 MG tablet  Commonly known as: COZAAR  Take 1 tablet by mouth daily  Start taking on: September 26, 2022  What changed:   medication strength  how much to take            CONTINUE taking these medications      Alcohol Swabs Pads     aspirin 81 MG chewable tablet     atorvastatin 40 MG tablet  Commonly known as: LIPITOR  Take 1 tablet by mouth nightly     blood glucose monitor kit and supplies  Dispense sufficient amount for indicated testing frequency plus additional to accommodate PRN testing needs. Dispense all needed supplies to include: monitor, strips, lancing device, lancets, control solutions, alcohol swabs. blood glucose test strips  Test 2 times a day & as needed for symptoms of irregular blood glucose. Dispense sufficient amount for indicated testing frequency plus additional to accommodate PRN testing needs.      docusate 100 MG Caps  Commonly known as: COLACE, DULCOLAX  Take 100 mg by mouth daily     Dulaglutide 1.5 MG/0.5ML Sopn  Inject 1.5 mg into the skin once a week     FLUoxetine 40 MG capsule  Commonly known as: PROzac  Take 2 capsules by mouth in the morning.      FreeStyle Lancets Misc  1 each by Does not apply route daily     glyBURIDE 5 MG tablet  Commonly known as: DIABETA  Take 2 tablets by mouth in the morning and at bedtime     Lantus SoloStar 100 UNIT/ML injection pen  Generic drug: insulin glargine  Inject 30 Units into the skin nightly     pantoprazole 20 MG tablet  Commonly known as: PROTONIX  Take 1 tablet by mouth every morning (before breakfast)     sucralfate 1 GM/10ML suspension  Commonly known as: Carafate  Take 10 mLs by mouth 4 times daily for 7 days            STOP taking these medications      acetaminophen 325 MG tablet  Commonly known as: TYLENOL     metFORMIN 500 MG tablet  Commonly known as: GLUCOPHAGE     metoclopramide 10 MG tablet  Commonly known as: REGLAN     metoprolol succinate 25 MG extended release tablet  Commonly known as: TOPROL XL     omeprazole 20 MG delayed release capsule  Commonly known as: PRILOSEC     oxyCODONE 5 MG immediate release tablet  Commonly known as: ROXICODONE     pioglitazone 30 MG tablet  Commonly known as: Actos               Where to Get Your Medications        These medications were sent to [address] [place] [address] Rd [phone] - F [phone]  [address] [address]      Phone: [phone]   carvedilol 12.5 MG tablet  clopidogrel 75 MG tablet  losartan 100 MG tablet        Objective Findings at Discharge:   /89   Pulse 77   Temp 98.8 °F (37.1 °C)   Resp 16   Ht 6' (1.829 m)   Wt 170 lb (77.1 kg)   SpO2 98%   BMI 23.06 kg/m²       Physical Exam:   General: NAD, sitting in chair, able to ambulate with walker  Eyes: no discharge  HENT: NCAT  Cardiovascular: RRR  Respiratory: Clear to auscultation   Gastrointestinal: Soft, non tender, nondistended  Genitourinary: no suprapubic tenderness  Musculoskeletal: No edema, nontender, right bka  Skin: warm, dry, no gross lesions  Neuro: Alert. No gross deficits  Psych: Mood appropriate. Imaging   XR CHEST PORTABLE  Result Date: 9/23/2022  No acute cardiopulmonary process.        Time Spent Discharging patient 31 minutes    Electronically signed by David Apple MD on 9/25/2022 at 1:30 PM

## 2022-09-26 ENCOUNTER — TELEPHONE (OUTPATIENT)
Dept: FAMILY MEDICINE CLINIC | Age: 42
End: 2022-09-26

## 2022-09-26 ENCOUNTER — CARE COORDINATION (OUTPATIENT)
Dept: CARE COORDINATION | Age: 42
End: 2022-09-26

## 2022-09-26 RX ORDER — PANTOPRAZOLE SODIUM 40 MG/1
TABLET, DELAYED RELEASE ORAL
COMMUNITY
Start: 2022-09-02

## 2022-09-26 NOTE — CARE COORDINATION
SW received a vm message left after hours Friday from [facility], [place] [phone]. Attempted to return phone call to [place]. No answer, voicemail message left requesting return call, left contact information. SW received return call from [address] worker, [place] who reported she spoke with Pt on Friday, notifying him that he will no longer have [employer], but will have Medicare A and B starting on 10/1. [place] reported Pt was relieved to learn he will still have some type of coverage. [place] reported that she did encourage Pt to apply for medicaid as he may be eligible for assistance with paying his medicare premiums. Discussed with [place] that this SW did make referrals for both [facility] and AL waivers. SW plans to follow up this week. KOLE reviewed Pt chart and Pt was discharged from hospital on 9/25. [facility] obtained verbal order from PCP for  through [facility]. Attempted phone call to [address] [place] [address] [place] worker for coordination of services. No answer, voicemail message left requesting return phone call. Phone call to Lisa with [employer] to follow up regarding AL waiver referral and [facility] referral.  Kristyn Machado reported Pt has an AL waiver assessment scheduled for 9/28 @ 1:30pm.  Discussed concerns regarding upcoming changes in health insurance. Lisa reported that she will notify the , Daljit Scott of the concerns and she could assist him in reapplying for medicaid at that time if needed. KOLE updated Vernon Barajas via secure email. Phone call to Pt to follow up regarding community resources. Pt reported he has had a lot of stress which led to his chest pains and hospitalization. SW asked Pt if he has food. Pt reported the hospital sent him home with food and he is ordering takeout. KOLE did discuss calling [address] with Pt on 9/27 @ 10am regarding a refrigerator. Pt was in agreement.   Reminded Pt of his appointment with [employer] on 9/28 @ 1:30pm for AL waiver assessment. SW notified Pt he will be receiving a phone call from [facility] to schedule his [facility] assessment. Pt stated understanding. Received return call from [facility], [place] who reported Pt will need Medicare Part D to cover his medications. Phone call to Eun Albarran to discuss, merged call to [employer]. Has been put on low income subsidy for prescriptions. Pt is on full medicaid currently, which will end 9/30. Pt will need to show red white and blue card to providers and  prescriptions will be through [facility] by [employer] [phone]. Pt reported he has not yet received this ID card for prescriptions. Merged call to [facility] to assist Pt in applying for medicaid. [facility] staff reported Pt's next review isn't due until January 2023 and Pt does not need to re-apply at this time. Pt will no longer have medicaid for health insurance, but will have QI1 which will cover his $170 monthly premium for medicare. Merged phone call to [employer] through [employer] [phone] and spoke with Fitz Can who reported pt will receive an ID card for his prescriptions the first week of his coverage in October 2022. KOLE plan of care:  KOLE will await return phone call from [address] to coordinate services. KOLE will call Pt on 9/27 @ 10am to assist in applying for assistance for refrigerator KOLE will follow up with pt on 9/29 following his AL waiver assessment.

## 2022-09-26 NOTE — TELEPHONE ENCOUNTER
[facility] Initial Follow Up Call    Outreach made within 2 business days of discharge: Yes    Patient: Buffy Pederson Patient : 1980   MRN: [id number]  Reason for Admission: There are no discharge diagnoses documented for the most recent discharge. Discharge Date: 22       Spoke with: Self    Discharge department/facility: [facility]    TCM Interactive Patient Contact:  Was patient able to fill all prescriptions: Yes  Was patient instructed to bring all medications to the follow-up visit: Yes  Is patient taking all medications as directed in the discharge summary?  Yes  Does patient understand their discharge instructions: Yes  Does patient have questions or concerns that need addressed prior to 7-14 day follow up office visit: no    Scheduled appointment with PCP within 7-14 days    Follow Up  Future Appointments   Date Time Provider Rosario Nesbitt   2022  2:00 PM Saran Arevalo MD [facility]   10/6/2022 11:00 AM [address] [place] [address]   11/10/2022 10:00 AM [facility]an Brian [facility]   2022  1:00 PM Estuardo Green MD [id number]       Jorge Durham MA

## 2022-09-27 ENCOUNTER — CARE COORDINATION (OUTPATIENT)
Dept: CARE COORDINATION | Age: 42
End: 2022-09-27

## 2022-09-27 NOTE — DISCHARGE INSTRUCTIONS
PHYSICIAN ORDERS AND DISCHARGE INSTRUCTIONS     NOTE: Upon discharge from the [address], you will receive a patient experience survey. We would be grateful if you would take the time to fill this survey out. Wound cleansing:              Do not scrub or use excessive force. Wash hands with soap and water before and after dressing changes. Prior to applying a clean dressing, cleanse wound with normal saline, wound cleanser, or mild soap and water. Ask the physician or nurse before getting the wound(s) wet in a shower     Daily Wound management:              Keep weight off wounds and reposition every 2 hours. Avoid standing for long periods of time. Apply wraps/stockings in AM and remove at bedtime. If swelling is present, elevate legs to the level of the heart or above for 30 minutes 4-5 times a day and/or when sitting. When taking antibiotics take entire prescription as ordered by physician do not stop taking until medicine is all gone. Wound Care Notes:  Rx: Raf Salinas  Apply for grafts 05/11/22: aLL GRAFTS approved 06/08/22 for Left Toe amp site  Reapply for graft 08/22/22 for Left toes amp site   YADY's   Right 1.07      Left    1.1           Date: 08/15/22           Grafts Left Foot Amp Site  Puraply #1 Graft #1 4x4 fenestrated 06/15/22   Puraply #2 Graft #2 4x4 fenestrated 06/22/22                                    Orders for this week:  09/29/22              FAX ORDERS TO [facility] [phone]       Left Plantar, and Left Great toe amp site -- Clean with soap and water, pat  dry. Apply anasept gel, stimulen powder and ioplex to wound beds. Cover with Sorbex. Wrap with Coban 2 Lite. Leave in place for 1 week. Right Leg Amp Site -- Clean with soap and water, pat dry. Every other staple removed today in clinic.  Apply Steri strips and wear client's sock. Do not use  yet. Follow Up Instructions: At the [address] [facility] [address] in 1 week: Tuesday   Primary Wound Care Provider: Jefry Tejada CNP   Call  for any questions or concerns.   Central [phone]

## 2022-09-27 NOTE — CARE COORDINATION
Phone call to Pt to follow up regarding the refrigerator. Pt reported he now has a refrigerator, stating it is a smaller refrigerator, but he has one. Reviewed home delivered meals with Pt. PT reported he believed the hospital sw set up home delivered meals for him. KOLE educated Pt that those are for individuals age 61 and over. Discussed with Pt that [facility] does provide home delivered meals and encouraged him to follow through with the assessment once he is contacted by [address] [place] [address]. KOLE plan of care:  KOLE will follow up with Pt on 9/29 following his AL waiver assessment scheduled for 9/28.

## 2022-09-29 ENCOUNTER — HOSPITAL ENCOUNTER (OUTPATIENT)
Dept: WOUND CARE | Age: 42
Discharge: HOME | End: 2022-09-29
Payer: COMMERCIAL

## 2022-09-29 ENCOUNTER — CARE COORDINATION (OUTPATIENT)
Dept: CARE COORDINATION | Age: 42
End: 2022-09-29

## 2022-09-29 VITALS
RESPIRATION RATE: 18 BRPM | DIASTOLIC BLOOD PRESSURE: 84 MMHG | SYSTOLIC BLOOD PRESSURE: 126 MMHG | TEMPERATURE: 97 F | HEART RATE: 104 BPM

## 2022-09-29 DIAGNOSIS — L03.116 CELLULITIS OF LEFT FOOT: ICD-10-CM

## 2022-09-29 DIAGNOSIS — Z89.432 PARTIAL NONTRAUMATIC AMPUTATION OF FOOT, LEFT (HCC): ICD-10-CM

## 2022-09-29 DIAGNOSIS — L97.522 DIABETIC ULCER OF TOE OF LEFT FOOT ASSOCIATED WITH TYPE 2 DIABETES MELLITUS, WITH FAT LAYER EXPOSED (HCC): ICD-10-CM

## 2022-09-29 DIAGNOSIS — E11.621 DIABETIC ULCER OF TOE OF LEFT FOOT ASSOCIATED WITH TYPE 2 DIABETES MELLITUS, WITH FAT LAYER EXPOSED (HCC): ICD-10-CM

## 2022-09-29 DIAGNOSIS — L97.422 DIABETIC ULCER OF LEFT MIDFOOT ASSOCIATED WITH TYPE 2 DIABETES MELLITUS, WITH FAT LAYER EXPOSED (HCC): Primary | ICD-10-CM

## 2022-09-29 DIAGNOSIS — E11.621 DIABETIC ULCER OF LEFT MIDFOOT ASSOCIATED WITH TYPE 2 DIABETES MELLITUS, WITH FAT LAYER EXPOSED (HCC): Primary | ICD-10-CM

## 2022-09-29 PROCEDURE — 11042 DBRDMT SUBQ TIS 1ST 20SQCM/<: CPT

## 2022-09-29 PROCEDURE — 11042 DBRDMT SUBQ TIS 1ST 20SQCM/<: CPT | Performed by: NURSE PRACTITIONER

## 2022-09-29 RX ORDER — GENTAMICIN SULFATE 1 MG/G
OINTMENT TOPICAL ONCE
Status: CANCELLED | OUTPATIENT
Start: 2022-09-29 | End: 2022-09-29

## 2022-09-29 RX ORDER — BACITRACIN ZINC AND POLYMYXIN B SULFATE 500; 1000 [USP'U]/G; [USP'U]/G
OINTMENT TOPICAL ONCE
Status: CANCELLED | OUTPATIENT
Start: 2022-09-29 | End: 2022-09-29

## 2022-09-29 RX ORDER — LIDOCAINE HYDROCHLORIDE 20 MG/ML
JELLY TOPICAL ONCE
Status: CANCELLED | OUTPATIENT
Start: 2022-09-29 | End: 2022-09-29

## 2022-09-29 RX ORDER — BETAMETHASONE DIPROPIONATE 0.05 %
OINTMENT (GRAM) TOPICAL ONCE
Status: CANCELLED | OUTPATIENT
Start: 2022-09-29 | End: 2022-09-29

## 2022-09-29 RX ORDER — CLOBETASOL PROPIONATE 0.5 MG/G
OINTMENT TOPICAL ONCE
Status: CANCELLED | OUTPATIENT
Start: 2022-09-29 | End: 2022-09-29

## 2022-09-29 RX ORDER — GINSENG 100 MG
CAPSULE ORAL ONCE
Status: CANCELLED | OUTPATIENT
Start: 2022-09-29 | End: 2022-09-29

## 2022-09-29 RX ORDER — LIDOCAINE 40 MG/G
CREAM TOPICAL ONCE
Status: CANCELLED | OUTPATIENT
Start: 2022-09-29 | End: 2022-09-29

## 2022-09-29 RX ORDER — LIDOCAINE 50 MG/G
OINTMENT TOPICAL ONCE
Status: CANCELLED | OUTPATIENT
Start: 2022-09-29 | End: 2022-09-29

## 2022-09-29 RX ORDER — BACITRACIN, NEOMYCIN, POLYMYXIN B 400; 3.5; 5 [USP'U]/G; MG/G; [USP'U]/G
OINTMENT TOPICAL ONCE
Status: CANCELLED | OUTPATIENT
Start: 2022-09-29 | End: 2022-09-29

## 2022-09-29 RX ORDER — LIDOCAINE HYDROCHLORIDE 40 MG/ML
SOLUTION TOPICAL ONCE
Status: CANCELLED | OUTPATIENT
Start: 2022-09-29 | End: 2022-09-29

## 2022-09-29 ASSESSMENT — PAIN DESCRIPTION - DESCRIPTORS: DESCRIPTORS: ACHING

## 2022-09-29 ASSESSMENT — PAIN DESCRIPTION - FREQUENCY: FREQUENCY: CONTINUOUS

## 2022-09-29 ASSESSMENT — PAIN DESCRIPTION - LOCATION: LOCATION: OTHER (COMMENT)

## 2022-09-29 ASSESSMENT — PAIN - FUNCTIONAL ASSESSMENT: PAIN_FUNCTIONAL_ASSESSMENT: PREVENTS OR INTERFERES SOME ACTIVE ACTIVITIES AND ADLS

## 2022-09-29 NOTE — PROGRESS NOTES
Multilayer Compression Wrap   (Not Unna) Below the Knee    NAME:  Dallie Goodell OF BIRTH:  1980  MEDICAL RECORD NUMBER:  [id number]  DATE:  9/29/2022    Multilayer compression wrap: Removed old Multilayer wrap if indicated and wash leg with mild soap/water. Applied moisturizing agent to dry skin as needed. Applied primary and secondary dressing as ordered. Applied multilayered dressing below the knee to left lower leg. Instructed patient/caregiver not to remove dressing and to keep it clean and dry. Instructed patient/caregiver on complications to report to provider, such as pain, numbness in toes, heavy drainage, and slippage of dressing. Instructed patient on purpose of compression dressing and on activity and exercise recommendations.       Electronically signed by Dulce Calderón LPN on 4/69/0924 at 8:75 PM
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
## 2022-09-29 NOTE — PROGRESS NOTES
Wound Care Center Progress Note With Procedure    Alan Soto  AGE: 39 y.o. GENDER: male  : 1980  EPISODE DATE:  2022     Subjective:     Chief Complaint   Patient presents with    Wound Check     ble         HISTORY of PRESENT ILLNESS      Alan Soto is a 39 y.o. male who presents today for wound evaluation of Chronic diabetic, pressure and non-healing surgical ulcer(s) of the left medial foot and lateral plantar surface. The ulcer is of marked severity. The underlying cause of the wound is diabetes, non-healing surgical. He presents today with a new wound to the right plantar foot that is diabetic and pressure in nature and of moderate severity. The patient has significant underlying medical conditions as below. The patient is having significant depression related to the recent and sudden death of his mother. 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 Aubrie Díaz. She placed a wound VAC to on the stump at that time. On 2022 Dr. Franc Durant performed a primary closure of his right BKA stump. Wound Pain Timing/Severity: none  Quality of pain: N/A  Severity of pain:  0 / 10   Modifying Factors: diabetes and chronic pressure  Associated Signs/Symptoms: drainage     Diabetes: Yes, on an oral and insulin regimen, last A1c 10.5 as of 22  Diabetes education provided today:    Diabetes pathoetiology, difference between type 1 and type 2 diabetes, and progressive nature of Type 2 DM. Diabetic Neuropathy: signs and therapy. Foot care: advised to wash feet daily, pat dry and apply lotion at night, avoiding between toes. Need to look at feet daily and report to a physician any signs of inflammation or skin damage. Discussed diabetes shoes and socks.   Diabetic management related to wound care    Smoking: Never smoker  Obesity:No  Anticoagulant therapy: No  Immunosuppression: No    Patient educated on the 6 essential components necessary for wound healing: Circulation, Debridements, Proper Dressings and Topical Wound Products, Infection Control, Edema Control and Offloading. Patient educated on those factors that negatively effect or impact wound healing: smoking, obesity, uncontrolled diabetes, anticoagulant and immunosuppressive regimens, inadequate nutrition, untreated arterial and venous disease if applicable and measures to manage edema. Nutritional status: well nourished. Discussed need for increased protein and calories for wound healing and good sources of protein (just over 7 grams for every 20 pounds of body weight). Animal-based foods high in protein (meat, poultry, fish, eggs, and dairy foods). Plant based foods high in protein (tofu, lentils, beans, chickpeas, nuts, quinoa and den seeds. Off Loading  Offloading or minimizing or removing weight placed on an area with poor circulation such as diabetic wounds or pressure. This can be achieved with crutches, wheel chair, knee walker etc. Minimizing pressure through partial weight bearing (minimizing the amount of  pressure applied and or the amount of time on the area of pressure) or maintaining a non-weight bearing status can be used to promote and often can be essential for thee wound to heal. Off loading may also need to be achieved for non-weight bearing wounds such as pressure ulcers to the torso. Turning and changing positions frequently, at least every two hours. Use of pressure cushion if sitting up in chair. Skin Care  Keep skin clean and well moisturized , moisturize routinely with ointments for heavier moisturizer needs for extremely dry skin or cracks such as A&D ointment and lotions for a light moisturizer such as CeraVe or Eucerin.  If incontinent change incontinence garments as soon as soiled and keeping skin clean and use barrier cream to protect the skin.         PAST MEDICAL HISTORY        Diagnosis Date    Abscess of left foot 4/22/2022    Anemia associated with acute blood loss 4/26/2022    Callus of foot     Right foot - see's Dr. Gisselle Guillen    Cellulitis of left foot 4/22/2022    Diabetic ulcer of left midfoot associated with type 2 diabetes mellitus, with muscle involvement without evidence of necrosis ([address] 4/26/2022    Diabetic ulcer of left midfoot associated with type 2 diabetes mellitus, with necrosis of muscle ([address] 6/7/2021    Diabetic ulcer of right midfoot associated with type 2 diabetes mellitus, with fat layer exposed ([address] 8/11/2020    Dizziness     positional    Essential hypertension     Follows with PCP    Hyperlipidemia     Lumbar radiculopathy     Septic embolism ([address] 6/13/2020    Subacute osteomyelitis of left foot ([address] 4/22/2022       PAST SURGICAL HISTORY    Past Surgical History:   Procedure Laterality Date    ACHILLES TENDON SURGERY Right 1/8/2021    RIGHT ACHILLES TENDON LENGTHENING REPAIR performed by Izzy Zamora DPM at [facility]  03/2018    [address] [place] [address] 51    DENTAL SURGERY      teeth extractions -half per patient    HERNIA REPAIR Bilateral 5/27/2020    BIALTERAL HERNIA INGUINAL REPAIR performed by Jasmyne Jacobs MD at [address] Right 9/2/2022    LEG AMPUTATION BELOW KNEE performed by Marquis Sanabria MD at [address] Right 9/5/2022    LEG AMPUTATION BELOW KNEE REVISION performed by Marquis Sanabria MD at [facility] 30 2018    ID OFFICE/OUTPT VISIT,PROCEDURE ONLY Left 4/17/2018    L5-S1 HEMILAMINECTOMY, REMOVAL OF DISC LEFT SIDE performed by Adela Hogan MD at [address] Right 1/8/2021    RIGHT TRANSMETATARSAL TOE AMPUTATION performed by Izzy Zamora DPM at [address] Left 4/23/2022    LEFT RAY GREAT TOE AMPUTATION performed by Annie Almeida MD at [address] [facility]    Family History   Problem Relation Age of Onset    Heart Disease Father     Diabetes Father     Diabetes Mother     Heart Disease Mother     Other Mother     Kidney Disease Mother     Heart Attack Mother        SOCIAL HISTORY    Social History     Tobacco Use    Smoking status: Never    Smokeless tobacco: Never   Vaping Use    Vaping Use: Never used   Substance Use Topics    Alcohol use: Yes     Comment: occasionally    Drug use: No       ALLERGIES    No Known Allergies    MEDICATIONS    Current Outpatient Medications on File Prior to Encounter   Medication Sig Dispense Refill    pantoprazole (PROTONIX) 40 MG tablet       carvedilol (COREG) 12.5 MG tablet Take 1 tablet by mouth 2 times daily (with meals) 60 tablet 0    losartan (COZAAR) 100 MG tablet Take 1 tablet by mouth daily 30 tablet 0    clopidogrel (PLAVIX) 75 MG tablet Take 1 tablet by mouth daily 30 tablet 0    docusate sodium (COLACE, DULCOLAX) 100 MG CAPS Take 100 mg by mouth daily      aspirin 81 MG chewable tablet Take 81 mg by mouth daily      pantoprazole (PROTONIX) 20 MG tablet Take 1 tablet by mouth every morning (before breakfast) 30 tablet 0    [DISCONTINUED] metFORMIN (GLUCOPHAGE) 500 MG tablet Take 2 tablets by mouth 2 times daily (with meals) Indications: Start tomorrow 03/14 360 tablet 1    [DISCONTINUED] metoclopramide (REGLAN) 10 MG tablet Take 1 tablet by mouth 4 times daily (before meals and nightly) 120 tablet 3    sucralfate (CARAFATE) 1 GM/10ML suspension Take 10 mLs by mouth 4 times daily for 7 days 414 mL 0    glyBURIDE (DIABETA) 5 MG tablet Take 2 tablets by mouth in the morning and at bedtime 120 tablet 1    Dulaglutide 1.5 MG/0.5ML SOPN Inject 1.5 mg into the skin once a week 5 pen 3    [DISCONTINUED] omeprazole (PRILOSEC) 20 MG delayed release capsule Take 1 capsule by mouth once daily in the morning 90 capsule 1    FLUoxetine (PROZAC) 40 MG capsule Take 2 capsules by mouth in the morning. 30 capsule 3    insulin glargine (LANTUS SOLOSTAR) 100 UNIT/ML injection pen Inject 30 Units into the skin nightly 5 pen 2    Alcohol Swabs PADS       atorvastatin (LIPITOR) 40 MG tablet Take 1 tablet by mouth nightly 90 tablet 1    blood glucose monitor strips Test 2 times a day & as needed for symptoms of irregular blood glucose. Dispense sufficient amount for indicated testing frequency plus additional to accommodate PRN testing needs. 100 strip 0    [DISCONTINUED] pioglitazone (ACTOS) 30 MG tablet Take 1 tablet by mouth daily 90 tablet 1    blood glucose monitor kit and supplies Dispense sufficient amount for indicated testing frequency plus additional to accommodate PRN testing needs. Dispense all needed supplies to include: monitor, strips, lancing device, lancets, control solutions, alcohol swabs. 1 kit 0    FreeStyle Lancets MISC 1 each by Does not apply route daily 100 each 3    [DISCONTINUED] acetaminophen (TYLENOL) 325 MG tablet Take 2 tablets by mouth every 4 hours as needed for Pain or Fever 120 tablet 3     No current facility-administered medications on file prior to encounter. REVIEW OF SYSTEMS    Pertinent items are noted in HPI. Constitutional: Negative for systemic symptoms including fever, chills and malaise. Objective:      /84   Pulse (!) 104   Temp 97 °F (36.1 °C) (Temporal)   Resp 18     PHYSICAL EXAM      General: The patient is in no acute distress. Mental status:  Patient is appropriate, is  oriented to place and plan of care.   Dermatologic exam: Visual inspection of the periwound reveals the skin to be normal in turgor and texture  Wound exam: see wound description below in procedure note      Assessment:     Problem List Items Addressed This Visit          Endocrine    WD-Diabetic ulcer of left midfoot associated with type 2 diabetes mellitus, with fat layer exposed ([address] - Primary    Relevant Orders    Initiate Outpatient Wound Care Protocol    Diabetic ulcer of toe of left foot associated with type 2 diabetes mellitus, with fat layer exposed ([address]    Relevant Orders    Initiate Outpatient Wound Care Protocol       Other    Cellulitis of left foot    Relevant Orders    Initiate Outpatient Wound Care Protocol    WD-Partial nontraumatic amputation of foot, left [facility])    Relevant Orders    Initiate Outpatient Wound Care Protocol       Procedure Note    Indications:  Based on my examination of this patient's wound(s) today, sharp excision into necrotic subcutaneous tissue is required to promote healing and evaluate the extent of previous healing. Performed by: RAFAEL Duenas - CNP    Consent obtained: Yes    Time out taken:  Yes    Pain Control: Anesthetic  Anesthetic: 4% Lidocaine Liquid Topical      Debridement:Excisional Debridement    Using curette and tissue nippers the wound(s) was/were sharply debrided down through and including the removal of subcutaneous tissue. Devitalized Tissue Debrided:  slough, exudate and callus    Pre Debridement Measurements:  Are located in the Wound Documentation Flow Sheet    All active wounds listed below with today's date are evaluated  Wound(s)    debrided this date include # : 1, 2     Post  Debridement Measurements:  Wound 05/11/22 #1 (onset 2 weeks) Left Medial Foot Amp Site (Active)   Wound Image   09/20/22 0937   Wound Etiology Non-Healing Surgical 09/29/22 1349   Dressing Status Clean;Dry; Intact 09/25/22 0900   Wound Cleansed Soap and water 09/29/22 1349   Dressing/Treatment ABD;Collagen with Ag 09/17/22 1251   Offloading for Diabetic Foot Ulcers Diabetic shoes/inserts 09/29/22 1349   Dressing Change Due 09/16/22 09/24/22 0300   Wound Length (cm) 3 cm 09/29/22 1349   Wound Width (cm) 0.5 cm 09/29/22 1349   Wound Depth (cm) 0.2 cm 09/20/22 0937   Wound Surface Area (cm^2) 1.5 cm^2 09/29/22 1349   Change in Wound Size % (l*w) 97.08 09/29/22 1349   Wound Volume (cm^3) Distance Tunneling (cm) 0 cm 09/29/22 1349   Tunneling Position ___ O'Clock 0 09/29/22 1349   Undermining Starts ___ O'Clock 0 09/29/22 1349   Undermining Ends___ O'Clock 0 09/29/22 1349   Undermining Maxium Distance (cm) 0 09/29/22 1349   Wound Assessment Pink/red 09/29/22 1349   Drainage Amount Moderate 09/29/22 1349   Drainage Description Serosanguinous 09/29/22 1349   Odor None 09/29/22 1349   Rosalie-wound Assessment Maceration; Hyperkeratosis (callous) 09/29/22 1349   Margins Defined edges 09/29/22 1349   Wound Thickness Description not for Pressure Injury Full thickness 09/29/22 1349   Number of days: 141         Percent of Wound(s) Debrided: approximately 100%    Total  Area  Debrided: 3.18 sq cm     Bleeding:  Minimal    Hemostasis Achieved:  not needed    Procedural Pain:  0  / 10     Post Procedural Pain:  0 / 10     Response to treatment:  Well tolerated by patient. Status of wound progress and description from last visit: The wounds on the left foot are smaller today, regimen as below. Last visit right plantar foot wound able to probe to bone, resulting right BKA. The BKA site is well approximated with staples intact, no S&S infection. Every other staple removed today and replaced with steri strips. The patient continues to orellana depression post his mother's death. Advanced Modality Checklist: Skin substitutes    [x] Yes []  No  Is the wound Greater than 1.0 sq cm  [x] Yes []  No  Has the wound had Documented Treatment for 30 days ? [] Yes [x]  No  Recurrent Wound with Skin Substitute with Last Year?  [] Yes [x]  No  Radiographic testing in the last 3 months? [] Yes [x]  No  Vascular Assessment in the last 6 months? [x] Yes []  No  Smoking Status  [x] Yes []  No  Nutrition Assessed  [x] Yes []  No  Wound Free from infection   [x] Yes []  No  Is wound free of eschar, slough, and/or Bio [place]?   [] Yes [x]  No  Malignant Process in Wound  [] Yes []  No  Hemoglobin A1C in the last 3 months?  last A1c 10.4 as of 4/26/22  [x] Yes []  No  Off loading Diabetic Foot Ulcer? [x] Yes []  No Compression Therapy of 20 mmHg or Greater? Plan:       Discharge Instructions         PHYSICIAN ORDERS AND DISCHARGE INSTRUCTIONS     NOTE: Upon discharge from the [address], you will receive a patient experience survey. We would be grateful if you would take the time to fill this survey out. Wound cleansing:              Do not scrub or use excessive force. Wash hands with soap and water before and after dressing changes. Prior to applying a clean dressing, cleanse wound with normal saline, wound cleanser, or mild soap and water. Ask the physician or nurse before getting the wound(s) wet in a shower     Daily Wound management:              Keep weight off wounds and reposition every 2 hours. Avoid standing for long periods of time. Apply wraps/stockings in AM and remove at bedtime. If swelling is present, elevate legs to the level of the heart or above for 30 minutes 4-5 times a day and/or when sitting. When taking antibiotics take entire prescription as ordered by physician do not stop taking until medicine is all gone. Wound Care Notes:  Rx: Godwin Killer  Apply for grafts 05/11/22: aLL GRAFTS approved 06/08/22 for Left Toe amp site  Reapply for graft 08/22/22 for Left toes amp site   YADY's   Right 1.07      Left    1.1           Date: 08/15/22           Grafts Left Foot Amp Site  Puraply #1 Graft #1 4x4 fenestrated 06/15/22   Puraply #2 Graft #2 4x4 fenestrated 06/22/22                                    Orders for this week:  09/29/22              FAX ORDERS TO [facility] [phone]       Left Plantar, and Left Great toe amp site--Clean with soap and water, pat  dry. Apply anasept gel, stimulen powder and ioplex to wound beds. Cover with Sorbex.    Wrap with Coban 2 Lite.   Leave in place for 1 week. Right Leg Amp Site -- Clean with soap and water, pat dry. Every other staple removed today in clinic. Apply Steri strips and wear client's sock. Do not use  yet. Follow Up Instructions: At the [address] in 1 week: Tuesday   Primary Wound Care Provider: Lilia Love CNP   Call  for any questions or concerns.   Central [phone]        Treatment Note      Written Patient Dismissal Instructions Given            Electronically signed by RAFAEL Duenas CNP on 2022 at 2:13 PM

## 2022-10-03 ENCOUNTER — TELEPHONE (OUTPATIENT)
Dept: FAMILY MEDICINE CLINIC | Age: 42
End: 2022-10-03

## 2022-10-03 NOTE — TELEPHONE ENCOUNTER
Philly Villa from [facility] called and did an evaluation on patient 9-28-22 and patient would benefit with assisted Living. Give Philly Villa a call and give verbal to go forward with the process of the passport waiver.

## 2022-10-04 ENCOUNTER — CARE COORDINATION (OUTPATIENT)
Dept: CARE COORDINATION | Age: 42
End: 2022-10-04

## 2022-10-04 ENCOUNTER — HOSPITAL ENCOUNTER (EMERGENCY)
Age: 42
Discharge: HOME | End: 2022-10-04
Attending: EMERGENCY MEDICINE
Payer: MEDICARE

## 2022-10-04 ENCOUNTER — TELEPHONE (OUTPATIENT)
Dept: FAMILY MEDICINE CLINIC | Age: 42
End: 2022-10-04

## 2022-10-04 ENCOUNTER — HOSPITAL ENCOUNTER (OUTPATIENT)
Dept: WOUND CARE | Age: 42
Discharge: HOME | End: 2022-10-04
Payer: MEDICARE

## 2022-10-04 VITALS
HEIGHT: 72 IN | BODY MASS INDEX: 20.99 KG/M2 | SYSTOLIC BLOOD PRESSURE: 158 MMHG | OXYGEN SATURATION: 100 % | RESPIRATION RATE: 15 BRPM | HEART RATE: 102 BPM | WEIGHT: 155 LBS | DIASTOLIC BLOOD PRESSURE: 108 MMHG | TEMPERATURE: 97.9 F

## 2022-10-04 VITALS
RESPIRATION RATE: 18 BRPM | TEMPERATURE: 97.6 F | SYSTOLIC BLOOD PRESSURE: 133 MMHG | HEART RATE: 118 BPM | DIASTOLIC BLOOD PRESSURE: 78 MMHG

## 2022-10-04 DIAGNOSIS — L97.422 DIABETIC ULCER OF LEFT MIDFOOT ASSOCIATED WITH TYPE 2 DIABETES MELLITUS, WITH FAT LAYER EXPOSED (HCC): Primary | ICD-10-CM

## 2022-10-04 DIAGNOSIS — Z89.432 PARTIAL NONTRAUMATIC AMPUTATION OF FOOT, LEFT (HCC): ICD-10-CM

## 2022-10-04 DIAGNOSIS — E11.621 DIABETIC ULCER OF LEFT MIDFOOT ASSOCIATED WITH TYPE 2 DIABETES MELLITUS, WITH FAT LAYER EXPOSED (HCC): Primary | ICD-10-CM

## 2022-10-04 DIAGNOSIS — T81.31XA DEHISCENCE OF OPERATIVE WOUND, INITIAL ENCOUNTER: Primary | ICD-10-CM

## 2022-10-04 DIAGNOSIS — Z89.511 S/P BKA (BELOW KNEE AMPUTATION), RIGHT (HCC): ICD-10-CM

## 2022-10-04 DIAGNOSIS — L03.116 CELLULITIS OF LEFT FOOT: ICD-10-CM

## 2022-10-04 DIAGNOSIS — L97.522 DIABETIC ULCER OF TOE OF LEFT FOOT ASSOCIATED WITH TYPE 2 DIABETES MELLITUS, WITH FAT LAYER EXPOSED (HCC): ICD-10-CM

## 2022-10-04 DIAGNOSIS — E11.621 DIABETIC ULCER OF TOE OF LEFT FOOT ASSOCIATED WITH TYPE 2 DIABETES MELLITUS, WITH FAT LAYER EXPOSED (HCC): ICD-10-CM

## 2022-10-04 PROCEDURE — 11042 DBRDMT SUBQ TIS 1ST 20SQCM/<: CPT | Performed by: NURSE PRACTITIONER

## 2022-10-04 PROCEDURE — 6370000000 HC RX 637 (ALT 250 FOR IP): Performed by: EMERGENCY MEDICINE

## 2022-10-04 PROCEDURE — 11042 DBRDMT SUBQ TIS 1ST 20SQCM/<: CPT

## 2022-10-04 PROCEDURE — 99283 EMERGENCY DEPT VISIT LOW MDM: CPT

## 2022-10-04 RX ORDER — GINSENG 100 MG
CAPSULE ORAL ONCE
Status: CANCELLED | OUTPATIENT
Start: 2022-10-04 | End: 2022-10-04

## 2022-10-04 RX ORDER — CLOBETASOL PROPIONATE 0.5 MG/G
OINTMENT TOPICAL ONCE
Status: CANCELLED | OUTPATIENT
Start: 2022-10-04 | End: 2022-10-04

## 2022-10-04 RX ORDER — GENTAMICIN SULFATE 1 MG/G
OINTMENT TOPICAL ONCE
Status: CANCELLED | OUTPATIENT
Start: 2022-10-04 | End: 2022-10-04

## 2022-10-04 RX ORDER — LIDOCAINE 40 MG/G
CREAM TOPICAL ONCE
Status: CANCELLED | OUTPATIENT
Start: 2022-10-04 | End: 2022-10-04

## 2022-10-04 RX ORDER — LIDOCAINE HYDROCHLORIDE 20 MG/ML
JELLY TOPICAL ONCE
Status: CANCELLED | OUTPATIENT
Start: 2022-10-04 | End: 2022-10-04

## 2022-10-04 RX ORDER — BACITRACIN, NEOMYCIN, POLYMYXIN B 400; 3.5; 5 [USP'U]/G; MG/G; [USP'U]/G
OINTMENT TOPICAL ONCE
Status: CANCELLED | OUTPATIENT
Start: 2022-10-04 | End: 2022-10-04

## 2022-10-04 RX ORDER — ACETAMINOPHEN 325 MG/1
650 TABLET ORAL ONCE
Status: COMPLETED | OUTPATIENT
Start: 2022-10-04 | End: 2022-10-04

## 2022-10-04 RX ORDER — BACITRACIN ZINC AND POLYMYXIN B SULFATE 500; 1000 [USP'U]/G; [USP'U]/G
OINTMENT TOPICAL ONCE
Status: CANCELLED | OUTPATIENT
Start: 2022-10-04 | End: 2022-10-04

## 2022-10-04 RX ORDER — OXYCODONE HYDROCHLORIDE AND ACETAMINOPHEN 5; 325 MG/1; MG/1
1 TABLET ORAL ONCE
Status: DISCONTINUED | OUTPATIENT
Start: 2022-10-04 | End: 2022-10-04

## 2022-10-04 RX ORDER — BETAMETHASONE DIPROPIONATE 0.05 %
OINTMENT (GRAM) TOPICAL ONCE
Status: CANCELLED | OUTPATIENT
Start: 2022-10-04 | End: 2022-10-04

## 2022-10-04 RX ORDER — LIDOCAINE 50 MG/G
OINTMENT TOPICAL ONCE
Status: CANCELLED | OUTPATIENT
Start: 2022-10-04 | End: 2022-10-04

## 2022-10-04 RX ORDER — LIDOCAINE HYDROCHLORIDE 40 MG/ML
SOLUTION TOPICAL ONCE
Status: CANCELLED | OUTPATIENT
Start: 2022-10-04 | End: 2022-10-04

## 2022-10-04 RX ADMIN — ACETAMINOPHEN 650 MG: 325 TABLET ORAL at 20:00

## 2022-10-04 ASSESSMENT — PAIN DESCRIPTION - ORIENTATION: ORIENTATION: RIGHT

## 2022-10-04 ASSESSMENT — PAIN SCALES - GENERAL
PAINLEVEL_OUTOF10: 10
PAINLEVEL_OUTOF10: 4
PAINLEVEL_OUTOF10: 0

## 2022-10-04 ASSESSMENT — PAIN SCALES - WONG BAKER: WONGBAKER_NUMERICALRESPONSE: 0

## 2022-10-04 ASSESSMENT — PAIN DESCRIPTION - LOCATION: LOCATION: LEG

## 2022-10-04 NOTE — WOUND CARE
Multilayer Compression Wrap   (Not Unna) Below the Knee    NAME:  Tamy Hodges OF BIRTH:  1980  MEDICAL RECORD NUMBER:  [id number]  DATE:  10/4/2022    Multilayer compression wrap: Removed old Multilayer wrap if indicated and wash leg with mild soap/water. Applied moisturizing agent to dry skin as needed. Applied primary and secondary dressing as ordered. Applied multilayered dressing below the knee to left lower leg. Instructed patient/caregiver not to remove dressing and to keep it clean and dry. Instructed patient/caregiver on complications to report to provider, such as pain, numbness in toes, heavy drainage, and slippage of dressing. Instructed patient on purpose of compression dressing and on activity and exercise recommendations.       Electronically signed by Ese Magdaleno LPN on 83/0/4010 at 2:99 PM

## 2022-10-04 NOTE — ED PROVIDER NOTES
Triage Chief Complaint:   Leg Injury (Right leg amputation injury)    [place]:  Anny Fraser is a 39 y.o. male that presents with with right leg wound dehiscence. Patient was in baseline state of health until today after he got home from suture removal from his right below-knee amputation stump surgery. Patient reports that he was getting out of the shower and lost his footing with his left foot falling onto his right side injuring his right below-knee stump. Patient reports that there was bleeding at the time that stopped with direct pressure. Patient noticed that his wound had come open. EMS was called. Patient denies any other injury at this time but does report severe pain at the site of the stump.     ROS:  General:  No fevers, no chills  Respiratory:  No shortness of breath  Neurologic:  No numbness, no weakness  Extremities:  No edema, + pain  Skin:  No rash, + surgical wound  Psych: No axienty    Past Medical History:   Diagnosis Date    Abscess of left foot 4/22/2022    Anemia associated with acute blood loss 4/26/2022    Callus of foot     Right foot - see's Dr. Rosana Mcintosh    Cellulitis of left foot 4/22/2022    Diabetic ulcer of left midfoot associated with type 2 diabetes mellitus, with muscle involvement without evidence of necrosis ([address] 4/26/2022    Diabetic ulcer of left midfoot associated with type 2 diabetes mellitus, with necrosis of muscle ([address] 6/7/2021    Diabetic ulcer of right midfoot associated with type 2 diabetes mellitus, with fat layer exposed ([address] 8/11/2020    Dizziness     positional    Essential hypertension     Follows with PCP    Hyperlipidemia     Lumbar radiculopathy     Septic embolism ([address] 6/13/2020    Subacute osteomyelitis of left foot ([address] 4/22/2022     Past Surgical History:   Procedure Laterality Date    ACHILLES TENDON SURGERY Right 1/8/2021    RIGHT ACHILLES TENDON LENGTHENING REPAIR performed by John Jarrett DPM at [address] [facility]  03/2018 Octavio Montiel    DENTAL SURGERY      teeth extractions -half per patient    HERNIA REPAIR Bilateral 5/27/2020    BIALTERAL HERNIA INGUINAL REPAIR performed by Arturo Ross MD at [address] [facility] Right 9/2/2022    LEG AMPUTATION BELOW KNEE performed by Dipti Dueñas MD at [address] [facility] Right 9/5/2022    LEG AMPUTATION BELOW KNEE REVISION performed by Dipti Dueñas MD at [facility] 2018    DE OFFICE/OUTPT VISIT,PROCEDURE ONLY Left 4/17/2018    L5-S1 HEMILAMINECTOMY, REMOVAL OF [address] LEFT SIDE performed by Shakir Carrion MD at [address] Right 1/8/2021    RIGHT TRANSMETATARSAL TOE AMPUTATION performed by Cassius Denny DPM at [address] Left 4/23/2022    LEFT RAY GREAT TOE AMPUTATION performed by Gordo Beltran MD at [address] EXTRACTION       Family History   Problem Relation Age of Onset    Heart Disease Father     Diabetes Father     Diabetes Mother     Heart Disease Mother     Other Mother     Kidney Disease Mother     Heart Attack Mother      Social History     Socioeconomic History    Marital status: Single     Spouse name: Not on file    Number of children: Not on file    Years of education: Not on file    Highest education level: Not on file   Occupational History    Not on file   Tobacco Use    Smoking status: Never    Smokeless tobacco: Never   Vaping Use    Vaping Use: Never used   Substance and Sexual Activity    Alcohol use: Yes     Comment: occasionally    Drug use: No    Sexual activity: Yes     Partners: Female   Other Topics Concern    Not on file   Social History Narrative    Not on file     Social Determinants of Health     Financial Resource Strain: Medium Risk    Difficulty of Paying Living Expenses: Somewhat hard   Food Insecurity: Food Insecurity Present    Worried About Running Out of Food in the Last Year: Sometimes true    Ran Out of Food in the Last Year: Sometimes true   Transportation Needs: No Transportation Needs    Lack of Transportation (Medical): No    Lack of Transportation (Non-Medical): No   Physical Activity: Inactive    Days of Exercise per Week: 0 days    Minutes of Exercise per Session: 0 min   Stress: Stress Concern Present    Feeling of Stress : Rather much   Social Connections: Unknown    Frequency of Communication with Friends and Family: Once a week    Frequency of Social Gatherings with Friends and Family: Not on file    Attends Church Services: Never    Active Member of Clubs or Organizations: Not on file    Attends Club or Organization Meetings: Never    Marital Status: Never    Intimate Partner Violence: Not on file   Housing Stability: [address] to Pay for Housing in the Last Year: No    Number of [place] in the Last Year: 1    Unstable Housing in the Last Year: No     No current facility-administered medications for this encounter. Current Outpatient Medications   Medication Sig Dispense Refill    pantoprazole (PROTONIX) 40 MG tablet       carvedilol (COREG) 12.5 MG tablet Take 1 tablet by mouth 2 times daily (with meals) 60 tablet 0    losartan (COZAAR) 100 MG tablet Take 1 tablet by mouth daily 30 tablet 0    clopidogrel (PLAVIX) 75 MG tablet Take 1 tablet by mouth daily 30 tablet 0    docusate sodium (COLACE, DULCOLAX) 100 MG CAPS Take 100 mg by mouth daily      aspirin 81 MG chewable tablet Take 81 mg by mouth daily      pantoprazole (PROTONIX) 20 MG tablet Take 1 tablet by mouth every morning (before breakfast) 30 tablet 0    sucralfate (CARAFATE) 1 GM/10ML suspension Take 10 mLs by mouth 4 times daily for 7 days 414 mL 0    glyBURIDE (DIABETA) 5 MG tablet Take 2 tablets by mouth in the morning and at bedtime 120 tablet 1    Dulaglutide 1.5 MG/0.5ML SOPN Inject 1.5 mg into the skin once a week 5 pen 3    FLUoxetine (PROZAC) 40 MG capsule Take 2 capsules by mouth in the morning.  30 capsule 3    insulin glargine (LANTUS SOLOSTAR) 100 UNIT/ML injection pen Inject 30 Units into the skin nightly 5 pen 2    Alcohol Swabs PADS       atorvastatin (LIPITOR) 40 MG tablet Take 1 tablet by mouth nightly 90 tablet 1    blood glucose monitor strips Test 2 times a day & as needed for symptoms of irregular blood glucose. Dispense sufficient amount for indicated testing frequency plus additional to accommodate PRN testing needs. 100 strip 0    blood glucose monitor kit and supplies Dispense sufficient amount for indicated testing frequency plus additional to accommodate PRN testing needs. Dispense all needed supplies to include: monitor, strips, lancing device, lancets, control solutions, alcohol swabs. 1 kit 0    FreeStyle Lancets MISC 1 each by Does not apply route daily 100 each 3     No Known Allergies    Nursing Notes Reviewed    Physical Exam:  ED Triage Vitals [10/04/22 1830]   Enc Vitals Group      BP (!) 158/108      Heart Rate (!) 116      Resp 22      Temp 97.9 °F (36.6 °C)      Temp Source Oral      SpO2 100 %      Weight 155 lb (70.3 kg)      Height 6' (1.829 m)      Head Circumference       Peak Flow       Pain Score       Pain Loc       Pain Edu? Excl. in [address]? My pulse ox interpretation is - normal    General appearance:  No acute distress. Sitting comfortably in bed. Skin:  Warm. Dry. Patient has approximately 4 cm long by half a centimeter area of his right below-knee amputation stump site wound that has dehisced. The dehiscence is full-thickness but does not expose any bone. There is just a scant amount of venous oozing at this time. Eye:  Extraocular movements intact. Ears, nose, mouth and throat:  Oral mucosa moist   Extremity:  No swelling. Normal ROM. Right below-knee amputation stump site as above. Heart:  Strong and symmetric peripheral pulses. Extremities are well perfused. Abdomen:  Non-distended. Respiratory:  Respirations nonlabored.      Neurological:  Alert and oriented times 3. No focal neuro deficits. Sensation intact to light touch to distal upper/lower extremities; 5/5 and symmetric  and dorsi/plantar flexion. I have reviewed and interpreted all of the currently available lab results from this visit (if applicable):  No results found for this visit on 10/04/22. Radiographs (if obtained):  [] The following radiograph was interpreted by myself in the absence of a radiologist:   [] Radiologist's Report Reviewed:  No orders to display         EKG (if obtained): (All EKG's are interpreted by myself in the absence of a cardiologist)    Chart review shows recent radiographs:  XR CHEST PORTABLE    Result Date: 9/23/2022  EXAMINATION: ONE XRAY VIEW OF THE CHEST 9/23/2022 6:49 am COMPARISON: 06/12/2021 HISTORY: ORDERING SYSTEM PROVIDED HISTORY: chest pain TECHNOLOGIST PROVIDED HISTORY: Reason for exam:->chest pain Reason for Exam: chest pain FINDINGS: There is no consolidation, pleural effusion, or pneumothorax. Cardiac silhouette is not enlarged and there is no pulmonary vascular congestion. Mediastinum and rafael are within normal limits. Bony thorax is unremarkable. No acute cardiopulmonary process. MDM:  Pt presents as above. Emergent conditions considered. Presentation prompted initial consultation with patient's general surgeon. I did speak with Dr. Ramona Flores. She at this time is recommending cleansing of the wound and Aquacel packing with a dry sterile dressing that is padded overlying this. Patient to follow-up with wound care center for further wound care and he is already established with them. We did discuss holding antibiotics at this time as wound was not infected prior to the injury and patient will have to monitor closely. Patient is declining Percocet which was ordered and he is just asking for Tylenol.     I discussed specific signs and symptoms and when to return to the emergency department as well as need for close outpatient follow-up. Questions sought and answered with the patient. They voice understanding and agree with plan. Care of this patient did occur during the COVID-19 pandemic. Clinical Impression:  1. Dehiscence of operative wound, initial encounter      Disposition referral (if applicable): [address]    Schedule an appointment as soon as possible for a visit   Gatito Molina TO FOLLOW UP    Elidia Marcano MD  [address]  [phone]    Schedule an appointment as soon as possible for a visit       [facility]  [address] [phone]  Today  If symptoms worsen  Disposition medications (if applicable):  New Prescriptions    No medications on file       Comment: Please note this report has been produced using speech recognition software and may contain errors related to that system including errors in grammar, punctuation, and spelling, as well as words and phrases that may be inappropriate. If there are any questions or concerns please feel free to contact the dictating provider for clarification.          Ainsley Paz MD  10/04/22 2002

## 2022-10-04 NOTE — CARE COORDINATION
SW had missed call from Pt. Phone call to Pt who reported the paramedics came to assess him and his BP was 138/100. Pt reported he took his medication, continues to feel like his heart is racing. Pt reported he goes to wound care at 2pm and will call the squad to take him to the hospital if he is not feeling better later in the day. KOLE plan of care:  KOLE will follow up with Pt in one week regarding KEVIN tuttle and [place].

## 2022-10-04 NOTE — CARE COORDINATION
Phone call to Pt to follow up regarding AL waiver and [facility] referrals. PT came out to see Pt this morning and Pt reports it is going well. Pt reported he goes to wound care appointment today at 2pm and they are scheduled to remove staples from leg. SW followed up regarding [facility] referral, Pt reported he completed a phone assessment fro [facility], but the  stated due to his lack of need with ADL's they are not sure he is eligible. Pt is awaiting a response. Pt reported he does not require assistance with ADL's or IADL's. Discussed with Pt that he needs to ask for assistance as needed to reduce risk of injury. Pt did state a neighbor takes out his garbage for him and admitted to doing too much at times. Pt reported he has a refrigerator and has food in it and paid his bills. An  came out to the house which did not go well, will sell the house as is. Discussed with Pt that PCP did provide verbal order to [employer] staff for AL waiver. Pt stated his BP was 200/117 this morning when home health nurse was at the home. SW asked how Pt is feeling. Pt reported his \"heart is racing\". Pt reported he is going to take his medication and if his heart doesn't stop racing he will call the EMT for assessment. Pt denies having a BP monitor at his home. KOLE plan of care:  SW will route note to [address] [facility] to notify of high bp. SW will follow up with Pt in one week 10/11 regarding progress with AL waiver and determine if Pt was accepted for [facility].

## 2022-10-04 NOTE — DISCHARGE INSTRUCTIONS
PHYSICIAN ORDERS AND DISCHARGE INSTRUCTIONS     NOTE: Upon discharge from the [address], you will receive a patient experience survey. We would be grateful if you would take the time to fill this survey out. Wound cleansing:              Do not scrub or use excessive force. Wash hands with soap and water before and after dressing changes. Prior to applying a clean dressing, cleanse wound with normal saline, wound cleanser, or mild soap and water. Ask the physician or nurse before getting the wound(s) wet in a shower     Daily Wound management:              Keep weight off wounds and reposition every 2 hours. Avoid standing for long periods of time. Apply wraps/stockings in AM and remove at bedtime. If swelling is present, elevate legs to the level of the heart or above for 30 minutes 4-5 times a day and/or when sitting. When taking antibiotics take entire prescription as ordered by physician do not stop taking until medicine is all gone. Wound Care Notes:  Rx: Sixto Rojas  Apply for grafts 05/11/22: aLL GRAFTS approved 06/08/22 for Left Toe amp site  Reapply for graft 08/22/22 for Left toes amp site   YADY's   Right 1.07      Left    1.1           Date: 08/15/22           Grafts Left Foot Amp Site  Puraply #1 Graft #1 4x4 fenestrated 06/15/22   Puraply #2 Graft #2 4x4 fenestrated 06/22/22                                    Orders for this week: 10/4/22     FAX ORDERS TO [facility] [phone]        Left Plantar, and Left Great toe amp site -- Clean with soap and water, pat  dry. Apply anasept gel, stimulen powder and ioplex to wound beds. Cover with Sorbex. Wrap with Coban 2 Lite. Leave in place for 1 week. Right Leg Amp Site -- Clean with soap and water, pat dry. Remainder of staples removed today in clinic.  Apply Steri strips and wear client's sock.  May consult  clinic. Follow Up Instructions: At the [address] in 1 week: Tuesday   Primary Wound Care Provider: Oma Wan CNP   Call  for any questions or concerns.   Central [phone]

## 2022-10-04 NOTE — ED NOTES
[address] p[address] Homero Mcgovern  10/04/22 Liliya Ware returned call      Javi Dave  10/04/22 2526

## 2022-10-04 NOTE — ED TRIAGE NOTES
Patient is brought to ED by EMS after a fall in the shower. Patient is about one month post BKA right leg and had staples removed this afternoon. Patient lives alone and was showering, when he fell and injured his leg. EMS states leg was leaking blood upon arrival and bandaged the extremity.

## 2022-10-04 NOTE — CARE COORDINATION
Collaborated with LSW in r/t patient status. Spoke with patient for ACM follow up. Patient reports that he has not been feeling well. Reports that his BP was 200/117 and his heart has been racing all day. Patient denies CP,  reports SOB on exertion; general uneasy feeling. Patient reports that he is experiencing a lot of external stressors at this time. Patient is active with [facility]. Discussed same day PCP visit/ HHC follow up. Offered contact  with PCP/ Letty Garvey to request prn visit; but concerned for inability to see patient ASAP. Patient reports worsening symptoms. Agreeable to [employer] contacting 911 with request for Paramedics to evaluate patient. TC to 911. Confirmed plan for follow through. Follow up contact with patient to confirm plan above. Patient confirms that he is in his wheelchair with phone in hand. Confirmed that front door is unlocked. Instructed on ACM plan to update HHC/ PCP in r.t patient change in condition. Attempted patient Provider's office. Office is closed for lunch. Attempted contact with [facility]. Message left on VM with [facility] contact information and request for return call. Follow up contact with patient. Reports that squad just left. He is feeling ok. BP was 138/100. Reports that he is feeling ok. Confirms plan to follow with Maria G Pace within the hour. Informed patient of plan to update PCP/ Letty Garvey office of status change. Emotional support given. Patient denies needs at present. ACM contact information provided. Encouraged patient to return call if needs arise. Spoke with Padmini/ PCP office. Detailed message provided to update in r.t patient status. Return call from [facility]. Updated in r.t patient status. Verbalized plan to update  Chapin Gamez. Pedro Mederos provided his fax [phone] if needs arise. Spoke with [address] PT [phone] who was with patient this morning.   Reports that patient BP was 158/80 when she was with him. PT confirmed that [employer] arrived for AL Waiver. Patient was provided with options for [facility] and Sabrina Farooq. PT reports that patient has been struggling with anxiety. Reports that patient's diet is poor and patient does drink alcohol routinely. Reports that patient will benefit greatly from AL as it is noted that he needs support to ensure compliance with plan of care. No needs reported. ACM contact information provided should questions arise. Return call received from Provider's office to update that patient has been contacted and was advised to go to the Emergency Room.

## 2022-10-04 NOTE — TELEPHONE ENCOUNTER
To Mary---    200/117 Yesterday's Blood pressure    150/98  today BP    Squad was called today due to patient becoming very anxious and short of breath. Medic checked BP and it was 138/100. Patient will not have a place to live in the near future due to his Mother's house being sold soon. He goes to Wound Care today. Patient stated he would definitely come in for a office visit if you want to see him.

## 2022-10-04 NOTE — ED NOTES
Wound washed, aquacel, sterile guaze, kerlix and ace wrap applied to [address] Allan Sen RN  10/04/22 1956

## 2022-10-04 NOTE — PROGRESS NOTES
Wound Care Center Progress Note With Procedure    Corrinne Hau  AGE: [address] [facility] y.o. GENDER: male  : 1980  EPISODE DATE:  10/4/2022     Subjective:     Chief Complaint   Patient presents with    Wound Check     Left foot         HISTORY of PRESENT ILLNESS      Corrinne Hau is a [address] y.o. male who presents today for wound evaluation of Chronic diabetic, pressure and non-healing surgical ulcer(s) of the left medial foot and lateral plantar surface. The ulcer is of marked severity. The underlying cause of the wound is diabetes, non-healing surgical. He presents today with a new wound to the right plantar foot that is diabetic and pressure in nature and of moderate severity. The patient has significant underlying medical conditions as below. The patient is having significant depression related to the recent and sudden death of his mother. 22: Since his last visit to the wound clinic 22 the patient was hospitalized -22) at [facility] with necrotizing fascitis right lower leg with resulting right BKA. 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 Florida Lang. She placed a wound VAC to on the stump at that time. On 2022 Dr. Luis Velazquez performed a primary closure of his right BKA stump. Wound Pain Timing/Severity: none  Quality of pain: N/A  Severity of pain:  0 / 10   Modifying Factors: diabetes and chronic pressure  Associated Signs/Symptoms: drainage     Diabetes: Yes, on an oral and insulin regimen, last A1c 10.5 as of 22  Diabetes education provided today:    Diabetes pathoetiology, difference between type 1 and type 2 diabetes, and progressive nature of Type 2 DM. Diabetic Neuropathy: signs and therapy. Foot care: advised to wash feet daily, pat dry and apply lotion at night, avoiding between toes. Need to look at feet daily and report to a physician any signs of inflammation or skin damage. Discussed diabetes shoes and socks.   Diabetic management related to wound care    Smoking: Never smoker  Obesity:No  Anticoagulant therapy: No  Immunosuppression: No    Patient educated on the 6 essential components necessary for wound healing: Circulation, Debridements, Proper Dressings and Topical Wound Products, Infection Control, Edema Control and Offloading. Patient educated on those factors that negatively effect or impact wound healing: smoking, obesity, uncontrolled diabetes, anticoagulant and immunosuppressive regimens, inadequate nutrition, untreated arterial and venous disease if applicable and measures to manage edema. Nutritional status: well nourished. Discussed need for increased protein and calories for wound healing and good sources of protein (just over 7 grams for every 20 pounds of body weight). Animal-based foods high in protein (meat, poultry, fish, eggs, and dairy foods). Plant based foods high in protein (tofu, lentils, beans, chickpeas, nuts, quinoa and den seeds. Off Loading  Offloading or minimizing or removing weight placed on an area with poor circulation such as diabetic wounds or pressure. This can be achieved with crutches, wheel chair, knee walker etc. Minimizing pressure through partial weight bearing (minimizing the amount of  pressure applied and or the amount of time on the area of pressure) or maintaining a non-weight bearing status can be used to promote and often can be essential for thee wound to heal. Off loading may also need to be achieved for non-weight bearing wounds such as pressure ulcers to the torso. Turning and changing positions frequently, at least every two hours. Use of pressure cushion if sitting up in chair. Skin Care  Keep skin clean and well moisturized , moisturize routinely with ointments for heavier moisturizer needs for extremely dry skin or cracks such as A&D ointment and lotions for a light moisturizer such as CeraVe or Eucerin.  If incontinent change incontinence garments as soon as soiled and keeping skin clean and use barrier cream to protect the skin.         PAST MEDICAL HISTORY        Diagnosis Date    Abscess of left foot 4/22/2022    Anemia associated with acute blood loss 4/26/2022    Callus of foot     Right foot - see's Dr. Zafar Sandra    Cellulitis of left foot 4/22/2022    Diabetic ulcer of left midfoot associated with type 2 diabetes mellitus, with muscle involvement without evidence of necrosis ([address] 4/26/2022    Diabetic ulcer of left midfoot associated with type 2 diabetes mellitus, with necrosis of muscle ([address] 6/7/2021    Diabetic ulcer of right midfoot associated with type 2 diabetes mellitus, with fat layer exposed ([address] 8/11/2020    Dizziness     positional    Essential hypertension     Follows with PCP    Hyperlipidemia     Lumbar radiculopathy     Septic embolism ([address] 6/13/2020    Subacute osteomyelitis of left foot ([address] 4/22/2022       PAST SURGICAL HISTORY    Past Surgical History:   Procedure Laterality Date    ACHILLES TENDON SURGERY Right 1/8/2021    RIGHT ACHILLES TENDON LENGTHENING REPAIR performed by Cassius Denny DPM at [facility]  03/2018    [facility]    DENTAL SURGERY      teeth extractions -half per patient    HERNIA REPAIR Bilateral 5/27/2020    BIALTERAL HERNIA INGUINAL REPAIR performed by Arturo Ross MD at [address] [facility] Right 9/2/2022    LEG AMPUTATION BELOW KNEE performed by Dipti Dueñas MD at [facility] Right 9/5/2022    LEG AMPUTATION BELOW KNEE REVISION performed by Dipti Dueñas MD at [facility] 2018    [place] OFFICE/OUTPT VISIT,PROCEDURE ONLY Left 4/17/2018    L5-S1 HEMILAMINECTOMY, REMOVAL OF DISC LEFT SIDE performed by Shakir Carrion MD at [address] Right 1/8/2021    RIGHT TRANSMETATARSAL TOE AMPUTATION performed by Cassius Denny DPM at [address] Left 4/23/2022    LEFT RAY GREAT TOE AMPUTATION performed by Allegra Rubio MD at [address] [facility]    Family History   Problem Relation Age of Onset    Heart Disease Father     Diabetes Father     Diabetes Mother     Heart Disease Mother     Other Mother     Kidney Disease Mother     Heart Attack Mother        SOCIAL HISTORY    Social History     Tobacco Use    Smoking status: Never    Smokeless tobacco: Never   Vaping Use    Vaping Use: Never used   Substance Use Topics    Alcohol use: Yes     Comment: occasionally    Drug use: No       ALLERGIES    No Known Allergies    MEDICATIONS    Current Outpatient Medications on File Prior to Encounter   Medication Sig Dispense Refill    pantoprazole (PROTONIX) 40 MG tablet       carvedilol (COREG) 12.5 MG tablet Take 1 tablet by mouth 2 times daily (with meals) 60 tablet 0    losartan (COZAAR) 100 MG tablet Take 1 tablet by mouth daily 30 tablet 0    clopidogrel (PLAVIX) 75 MG tablet Take 1 tablet by mouth daily 30 tablet 0    docusate sodium (COLACE, DULCOLAX) 100 MG CAPS Take 100 mg by mouth daily      aspirin 81 MG chewable tablet Take 81 mg by mouth daily      pantoprazole (PROTONIX) 20 MG tablet Take 1 tablet by mouth every morning (before breakfast) 30 tablet 0    [DISCONTINUED] metFORMIN (GLUCOPHAGE) 500 MG tablet Take 2 tablets by mouth 2 times daily (with meals) Indications: Start tomorrow 03/14 360 tablet 1    [DISCONTINUED] metoclopramide (REGLAN) 10 MG tablet Take 1 tablet by mouth 4 times daily (before meals and nightly) 120 tablet 3    sucralfate (CARAFATE) 1 GM/10ML suspension Take 10 mLs by mouth 4 times daily for 7 days 414 mL 0    glyBURIDE (DIABETA) 5 MG tablet Take 2 tablets by mouth in the morning and at bedtime 120 tablet 1    Dulaglutide 1.5 MG/0.5ML SOPN Inject 1.5 mg into the skin once a week 5 pen 3    [DISCONTINUED] omeprazole (PRILOSEC) 20 MG delayed release capsule Take 1 capsule by mouth once daily in the morning 90 capsule 1    FLUoxetine (PROZAC) 40 MG capsule Take 2 capsules by mouth in the morning. 30 capsule 3    insulin glargine (LANTUS SOLOSTAR) 100 UNIT/ML injection pen Inject 30 Units into the skin nightly 5 pen 2    Alcohol Swabs PADS       atorvastatin (LIPITOR) 40 MG tablet Take 1 tablet by mouth nightly 90 tablet 1    blood glucose monitor strips Test 2 times a day & as needed for symptoms of irregular blood glucose. Dispense sufficient amount for indicated testing frequency plus additional to accommodate PRN testing needs. 100 strip 0    [DISCONTINUED] pioglitazone (ACTOS) 30 MG tablet Take 1 tablet by mouth daily 90 tablet 1    blood glucose monitor kit and supplies Dispense sufficient amount for indicated testing frequency plus additional to accommodate PRN testing needs. Dispense all needed supplies to include: monitor, strips, lancing device, lancets, control solutions, alcohol swabs. 1 kit 0    FreeStyle Lancets MISC 1 each by Does not apply route daily 100 each 3    [DISCONTINUED] acetaminophen (TYLENOL) 325 MG tablet Take 2 tablets by mouth every 4 hours as needed for Pain or Fever 120 tablet 3     No current facility-administered medications on file prior to encounter. REVIEW OF SYSTEMS    Pertinent items are noted in HPI. Constitutional: Negative for systemic symptoms including fever, chills and malaise. Objective:      /78   Pulse (!) 118   Temp 97.6 °F (36.4 °C) (Temporal)   Resp 18     PHYSICAL EXAM      General: The patient is in no acute distress. Mental status:  Patient is appropriate, is  oriented to place and plan of care.   Dermatologic exam: Visual inspection of the periwound reveals the skin to be normal in turgor and texture  Wound exam: see wound description below in procedure note      Assessment:     Problem List Items Addressed This Visit          Endocrine    WD-Diabetic ulcer of left midfoot associated with type 2 diabetes mellitus, with fat layer exposed ([facility] - Primary    Relevant Orders    Initiate Outpatient Wound Care Protocol    Diabetic ulcer of toe of left foot associated with type 2 diabetes mellitus, with fat layer exposed ([address]    Relevant Orders    Initiate Outpatient Wound Care Protocol       Other    Cellulitis of left foot    Relevant Orders    Initiate Outpatient Wound Care Protocol    WD-Partial nontraumatic amputation of foot, left ([address]    Relevant Orders    Initiate Outpatient Wound Care Protocol    WD-S/P BKA (below knee amputation), right ([address]       Procedure Note    Indications:  Based on my examination of this patient's wound(s) today, sharp excision into necrotic subcutaneous tissue is required to promote healing and evaluate the extent of previous healing. Performed by: RAFAEL Torres CNP    Consent obtained: Yes    Time out taken:  Yes    Pain Control: Anesthetic  Anesthetic: 4% Lidocaine Liquid Topical      Debridement:Excisional Debridement    Using curette and tissue nippers the wound(s) was/were sharply debrided down through and including the removal of subcutaneous tissue. Devitalized Tissue Debrided:  slough, exudate and callus    Pre Debridement Measurements:  Are located in the Wound Documentation Flow Sheet    All active wounds listed below with today's date are evaluated  Wound(s)    debrided this date include # : 1, 2     Post  Debridement Measurements:  Wound 05/11/22 #1 (onset 2 weeks) Left Medial Foot Amp Site (Active)   Wound Image   10/04/22 1343   Wound Etiology Non-Healing Surgical 10/04/22 1343   Dressing Status New dressing applied;Dry;Clean; Intact 10/04/22 1431   Wound Cleansed Soap and water 10/04/22 1343   Dressing/Treatment ABD;Collagen with Ag 09/17/22 1251   Offloading for Diabetic Foot Ulcers Diabetic shoes/inserts 10/04/22 1343   Dressing Change Due 09/16/22 09/24/22 0300   Wound Length (cm) 2.5 cm 10/04/22 1343   Wound Width (cm) 0.7 cm 10/04/22 1343   Wound Depth (cm) 0.2 cm 10/04/22 1343   Wound Surface Area (cm^2) 1.75 cm^2 10/04/22 1343   Change in Wound Size % (l*w) 96.6 10/04/22 1343   Wound Volume (cm^3) 0.35 cm^3 10/04/22 1343   Wound Healing % 99 10/04/22 1343   Post-Procedure Length (cm) 2.5 cm 10/04/22 1409   Post-Procedure Width (cm) 0.8 cm 10/04/22 1409   Post-Procedure Depth (cm) 0.2 cm 10/04/22 1409   Post-Procedure Surface Area (cm^2) 2 cm^2 10/04/22 1409   Post-Procedure Volume (cm^3) 0.4 cm^3 10/04/22 1409   Distance Tunneling (cm) 0 cm 10/04/22 1343   Tunneling Position ___ O'Clock 0 10/04/22 1343   Undermining Starts ___ O'Clock 0 10/04/22 1343   Undermining Ends___ O'Clock 0 10/04/22 1343   Undermining Maxium Distance (cm) 0 10/04/22 1343   Wound Assessment Devitalized tissue;Pink/red;Slough 10/04/22 1343   Drainage Amount Moderate 10/04/22 1343   Drainage Description Serosanguinous; Yellow 10/04/22 1343   Odor None 10/04/22 1343   Rosalie-wound Assessment Hyperkeratosis (callous); Maceration 10/04/22 1343   Margins Defined edges 10/04/22 1343   Wound Thickness Description not for Pressure Injury Full thickness 10/04/22 1343   Number of days: 146       Wound 05/11/22 #2 (onset unknown) Left Plantar (Active)   Wound Image   10/04/22 1343   Wound Etiology Diabetic 10/04/22 1343   Dressing Status New dressing applied;Clean;Dry; Intact 10/04/22 1431   Wound Cleansed Soap and water 10/04/22 1343   Dressing/Treatment ABD;Collagen with Ag 09/17/22 1251   Offloading for Diabetic Foot Ulcers Diabetic shoes/inserts 10/04/22 1343   Dressing Change Due 09/04/22 09/24/22 0300   Wound Length (cm) 1.6 cm 10/04/22 1343   Wound Width (cm) 1.6 cm 10/04/22 1343   Wound Depth (cm) 0.2 cm 10/04/22 1343   Wound Surface Area (cm^2) 2.56 cm^2 10/04/22 1343   Change in Wound Size % (l*w) -60 10/04/22 1343   Wound Volume (cm^3) 0.512 cm^3 10/04/22 1343   Wound Healing % -60 10/04/22 1343   Post-Procedure Length (cm) 1.6 cm 10/04/22 1409   Post-Procedure Width (cm) 1.6 cm 10/04/22 1409   Post-Procedure Depth (cm) 0.2 cm 10/04/22 1409   Post-Procedure Surface Area (cm^2) 2.56 cm^2 10/04/22 1409   Post-Procedure Volume (cm^3) 0.512 cm^3 10/04/22 1409   Distance Tunneling (cm) 0 cm 10/04/22 1343   Tunneling Position ___ O'Clock 0 10/04/22 1343   Undermining Starts ___ O'Clock 0 10/04/22 1343   Undermining Ends___ O'Clock 0 10/04/22 1343   Undermining Maxium Distance (cm) 0 10/04/22 1343   Wound Assessment Pink/red 10/04/22 1343   Drainage Amount Moderate 10/04/22 1343   Drainage Description Serosanguinous 10/04/22 1343   Odor None 10/04/22 1343   Rosalie-wound Assessment Hyperkeratosis (callous) 10/04/22 1343   Margins Defined edges 10/04/22 1343   Wound Thickness Description not for Pressure Injury Full thickness 10/04/22 1343   Number of days: 146     Percent of Wound(s) Debrided: approximately 100%    Total  Area  Debrided: 4.31 sq cm     Bleeding:  Minimal    Hemostasis Achieved:  not needed    Procedural Pain:  0  / 10     Post Procedural Pain:  0 / 10     Response to treatment:  Well tolerated by patient. Status of wound progress and description from last visit: The wounds on the left foot are clean with no S&S infection. Regimen as below. In 9/22 right plantar foot wound able to probe to bone, resulting right BKA with necrotizing fascitis. The BKA site with stples removed no dehiscence  replaced with steri strips.  rep with him amnd will start using a  today. The patient continues to orellana depression post his mother's death. Advanced Modality Checklist: Skin substitutes    [x] Yes []  No  Is the wound Greater than 1.0 sq cm  [x] Yes []  No  Has the wound had Documented Treatment for 30 days ? [] Yes [x]  No  Recurrent Wound with Skin Substitute with Last Year?  [] Yes [x]  No  Radiographic testing in the last 3 months? [] Yes [x]  No  Vascular Assessment in the last 6 months?   [x] Yes []  No  Smoking Status  [x] Yes []  No  Nutrition Assessed  [x] Yes []  No  Wound Free from infection   [x] Yes []  No  Is wound free of eschar, slough, and/or Bio [place]? [] Yes [x]  No  Malignant Process in Wound  [] Yes []  No  Hemoglobin A1C in the last 3 months?  last A1c 10.4 as of 4/26/22  [x] Yes []  No  Off loading Diabetic Foot Ulcer? [x] Yes []  No Compression Therapy of 20 mmHg or Greater? Plan:       Discharge Instructions         PHYSICIAN ORDERS AND DISCHARGE INSTRUCTIONS     NOTE: Upon discharge from the [address], you will receive a patient experience survey. We would be grateful if you would take the time to fill this survey out. Wound cleansing:              Do not scrub or use excessive force. Wash hands with soap and water before and after dressing changes. Prior to applying a clean dressing, cleanse wound with normal saline, wound cleanser, or mild soap and water. Ask the physician or nurse before getting the wound(s) wet in a shower     Daily Wound management:              Keep weight off wounds and reposition every 2 hours. Avoid standing for long periods of time. Apply wraps/stockings in AM and remove at bedtime. If swelling is present, elevate legs to the level of the heart or above for 30 minutes 4-5 times a day and/or when sitting. When taking antibiotics take entire prescription as ordered by physician do not stop taking until medicine is all gone. Wound Care Notes:  Rx: Radha Stacy  Apply for grafts 05/11/22: aLL GRAFTS approved 06/08/22 for Left Toe amp site  Reapply for graft 08/22/22 for Left toes amp site   YADY's   Right 1.07      Left    1.1           Date: 08/15/22           Grafts Left Foot Amp Site  Puraply #1 Graft #1 4x4 fenestrated 06/15/22   Puraply #2 Graft #2 4x4 fenestrated 06/22/22                                    Orders for this week: 10/4/22     FAX ORDERS TO [facility] [phone]        Left Plantar, and Left Great toe amp site -- Clean with soap and water, pat  dry. Apply anasept gel, stimulen powder and ioplex to wound beds. Cover with Sorbex. Wrap with Coban 2 Lite. Leave in place for 1 week. Right Leg Amp Site -- Clean with soap and water, pat dry. Remainder of staples removed today in clinic. Apply Steri strips and wear client's sock. May consult  clinic. Follow Up Instructions: At the [address] [place] [address] in 1 week: Tuesday   Primary Wound Care Provider: Sandy Rodriguez CNP   Call  for any questions or concerns.   Central [phone]        Treatment Note Wound 22 #1 (onset 2 weeks) Left Medial Foot Amp Site-Dressing/Treatment:  (Anasept gel, Stimulen, Ioples, Sorbex, Coban 2 lite)  Wound 22 #2 (onset unknown) Left Plantar-Dressing/Treatment:  (Anasept gel, Stimulen, Ioples, Sorbex, Coban 2 lite)    Written Patient Dismissal Instructions Given            Electronically signed by RAFAEL Austin CNP on 10/4/2022 at 2:34 PM

## 2022-10-04 NOTE — TELEPHONE ENCOUNTER
Spoke with pt who states he is taking all his meds as directed. Advised pt to go to er, pt agreed and voiced understanding.

## 2022-10-06 ENCOUNTER — TELEMEDICINE (OUTPATIENT)
Dept: PSYCHOLOGY | Age: 42
End: 2022-10-06
Payer: MEDICARE

## 2022-10-06 DIAGNOSIS — F41.9 ANXIETY: ICD-10-CM

## 2022-10-06 DIAGNOSIS — F33.1 MAJOR DEPRESSIVE DISORDER, RECURRENT, MODERATE (HCC): Primary | ICD-10-CM

## 2022-10-06 PROCEDURE — 90791 PSYCH DIAGNOSTIC EVALUATION: CPT | Performed by: PSYCHOLOGIST

## 2022-10-06 NOTE — PROGRESS NOTES
Patient Location: Home       Provider Location (City/State): [place]       This virtual visit was conducted via interactive/real-time audio. Pursuant to the emergency declaration under the [address] [place] [address], Critical access hospital waiver authority and the [employer] Act, this Virtual  Visit was conducted, with patient's consent, to reduce the patient's risk of exposure to COVID-19 and provide continuity of care for an established patient. Services were provided through a telephonic synchronous discussion virtually to substitute for in-person clinic visit. Additionally, this provider made reasonable effort to verify identify of patient, conducted risk benefit analysis and have determined patient's presenting problem and condition are consistent with the use of telepsychology to patient's benefit, ensured pt has access, knowledge, and skills required to use required technology, obtained alternative means of contacting patient, provided pt with alternative means of contacting provider, reviewed informed consent and obtained verbal agreement in lieu of written informed consent, as such is rendered impossible due to the unexpected nature secondary to COVID-19 clinical recommendations. Consent:  He and/or health care decision maker is aware that that he may receive a bill for this telephone service, depending on his insurance coverage, and has provided verbal consent to proceed: Yes    I affirm this is a Patient Initiated Episode with an Established Patient who has not had a related appointment within my department in the past 7 days or scheduled within the next 24 hours. Total Time: minutes: 11-20 minutes      Behavioral Health Consultation  Kal Starr Psy.D.   Psychologist    Time spent with Patient: 20 minutes  Visit number: 1  Reason for Consult:  depression  Referring Provider: No referring provider defined for this encounter. Informed consent:  Pt provided informed consent for the behavioral health program. Discussed with patient model of service to include the limits of confidentiality (i.e. abuse reporting, suicide intervention, etc.) and focused intervention approach. Pt indicated understanding. S:  ----------------------------------------------------------------------------------------------------------------------    Pt arrived 12 mins late to virtual appt    Presenting problem:  Depression and anxiety  \"This year has been the worst year for me ever. \" Pt's mother  in May 2022. \"She passed away in my arms. \" Sx of depression have been present even prior to mother's death, however he could \"get past them\" prior to her death. Pt has lived w his parents for his entire life. 2022 he required below knee amputation. Since mother's death he has experienced depressed mood, anhedonia, anergia, amotivation, poor sleep, fluctuating appetite, diminished concentration. Described his mood as \"mostly dull. \" Also reported pattern of \"a lot of anxiety\" and \"fighting evil demons. \"     Has  who is attempting to transition him to assisted living.        Mother  last year, May 2022  Father  8 yrs ago, May 2012    Social:   Lives alone     Substance Use: not assessed d/t time   EtOH:   Cannabis:   Tobacco:    Other:     Psychiatric tx hx:    Psych meds: lorazepam and fluoxetine   Outpatient psychotherapy: not assessed d/t time     Inpatient psych hospitalizations: not assessed d/t time     Trauma/Abuse hx: not assessed d/t time :    Goals:  \"I want to get my emotions under control and my thoughts back to normal.\"     O:  ----------------------------------------------------------------------------------------------------------------------  MSE:    Orientation:  oriented to person, place, time, and general circumstances  Appearance and behavior:  alert, cooperative  Speech:  spontaneous, normal rate, and normal volume  Mood: depressed   Thought Content:  intact, hopelessness, and helplessness  Thought Process:  linear, goal directed, and coherent  Interest/Pleasure: Loss of Pleasure/Fun  Sleep disturbance: Yes  Motivation: Moderate  Energy: Tired/Fatigued  Morbid ideation No  Suicide Assessment: no suicidal ideation    A:  ----------------------------------------------------------------------------------------------------------------------  Diagnosis:    1. Major depressive disorder, recurrent, moderate (HCC)    2. Anxiety         PHQ Scores 8/18/2022 7/28/2022 6/3/2022 3/10/2021 3/11/2020 11/20/2019   PHQ2 Score 6 3 3 6 1 0   PHQ9 Score 24 21 18 22 1 0     Interpretation of Total Score Depression Severity: 1-4 = Minimal depression, 5-9 = Mild depression, 10-14 = Moderate depression, 15-19 = Moderately severe depression, 20-27 = Severe depression    P:  ----------------------------------------------------------------------------------------------------------------------     [x]Discussed potential treatments for   1. Major depressive disorder, recurrent, moderate (HCC)    2. Anxiety       [x]Conducted functional assessment  [x]Established rapport  [x]Supportive interventions   [x][place]-setting to identify pt's primary goals for [facility] [place] visit / overall health  [x]Provided psychoeducation/handout on   1. Major depressive disorder, recurrent, moderate (HCC)    2. Anxiety            Other:   []     Pt Behavioral Change Plan: 1. Return to Dr. Hilton Yang in 4 week(s)    2.                 Feedback provided to pt's PCP via EPIC and/or oral report

## 2022-10-07 ENCOUNTER — CARE COORDINATION (OUTPATIENT)
Dept: CARE COORDINATION | Age: 42
End: 2022-10-07

## 2022-10-07 NOTE — CARE COORDINATION
Ambulatory Care Coordination Note      ACC: Elizabeth Ackerman, RN      Call to pt for acm f/u. Reports he Contacted wound care and  they are to see him next week. Agency coming to re-eval pt at home due to insurance changes per pt. No new symptoms. Plan: acm will refer to sw to look into possible traanspo assistance through ins and will continue to follow to assess for symptoms/follow progress of [place] waiver. Ambulatory Care Coordination Assessment    Care Coordination Protocol  Referral from Primary Care Provider: No  Week 1 - Initial Assessment     Do you have all of your prescriptions and are they filled?: Yes  Barriers to medication adherence: None  Are you able to afford your medications?: Yes  How often do you have trouble taking your medications the way you have been told to take them?: I always take them as prescribed. Do you have Home O2 Therapy?: No      Ability to seek help/take action for Emergent Urgent situations i.e. fire, crime, inclement weather or health crisis. : Independent  Ability to ambulate to restroom: Needs Assistance  Ability handle personal hygeine needs (bathing/dressing/grooming): Independent  Ability to manage Medications: Independent  Ability to prepare Food Preparation: Independent  Ability to maintain home (clean home, laundry): Independent  Ability to drive and/or has transportation: Independent  Ability to do shopping: Independent  Ability to manage finances: Independent  Is patient able to live independently?: Yes     Current Housing: Private Residence        Per the Fall Risk Screening, did the patient have 2 or more falls or 1 fall with injury in the past year?: Yes  How often do you think you are about to fall and you do NOT fall?  For example, you grab something to stabilize yourself or hold onto a wall/furniture?: Frequently  Use of a Mobility Aid: Yes  Difficulty walking/impaired gait: Yes  Issues with feet or shoes like numbness, edema, shoes not fitting: Yes  Changes in vision, poor vision or poor lighting in environment: No  Dizziness: Yes  Other Fall Risk: Yes  What other fall risks does the patient have?: amputation toes     Frequent urination at night?: No  Do you use rails/bars?: No  Do you have a non-slip tub mat?: Yes     Are you experiencing loss of meaning?: Yes (Comment: Mother recently passed)  Are you experiencing loss of hope and peace?: Yes     Thinking about your patient's physical health needs, are there any symptoms or problems (risk indicators) you are unsure about that require further investigation?: Mild vague physical symptoms or problems; but do not impact on daily life or are not of concern to patient   Are the patients physical health problems impacting on their mental well-being?: Moderate to severe impact upon mental well-being and preventing enjoyment of usual activities   Are there any problems with your patients lifestyle behaviors (alcohol, drugs, diet, exercise) that are impacting on physical or mental well-being?: Moderate to severe impact on well-being, preventing enjoyment of usual activities   Suggested Interventions and Community Resources  Fall Risk Prevention: In Process Home Health Services: In Process   Home Care Waiver: In Process   Registered Dietician: In Process   Social Work: In Process   Zone Management Tools: In Process                  Prior to Admission medications    Medication Sig Start Date End Date Taking?  Authorizing Provider   pantoprazole (PROTONIX) 40 MG tablet  9/2/22   Historical Provider, MD   carvedilol (COREG) 12.5 MG tablet Take 1 tablet by mouth 2 times daily (with meals) 9/25/22   Ines Medina MD   losartan (COZAAR) 100 MG tablet Take 1 tablet by mouth daily 9/26/22   Ines Medina MD   clopidogrel (PLAVIX) 75 MG tablet Take 1 tablet by mouth daily 9/26/22   Ines Medina MD   docusate sodium (COLACE, DULCOLAX) 100 MG CAPS Take 100 mg by mouth daily 9/17/22   REDD Santillan MD   aspirin 81 MG chewable tablet Take 81 mg by mouth daily    Historical Provider, MD   pantoprazole (PROTONIX) 20 MG tablet Take 1 tablet by mouth every morning (before breakfast) 9/2/22   Jessi Tejeda MD   metFORMIN (GLUCOPHAGE) 500 MG tablet Take 2 tablets by mouth 2 times daily (with meals) Indications: Start tomorrow 03/14 9/2/22 9/16/22  Jessi Tejeda MD   metoclopramide (REGLAN) 10 MG tablet Take 1 tablet by mouth 4 times daily (before meals and nightly) 8/29/22 9/2/22  Everitt Mortimer, APRN - CNP   sucralfate (CARAFATE) 1 GM/10ML suspension Take 10 mLs by mouth 4 times daily for 7 days 8/18/22 9/26/22  RAFAEL Perez CNP   glyBURIDE (DIABETA) 5 MG tablet Take 2 tablets by mouth in the morning and at bedtime 8/1/22   RAFAEL Perez CNP   Dulaglutide 1.5 MG/0.5ML SOPN Inject 1.5 mg into the skin once a week 8/1/22   RAFAEL Perez CNP   omeprazole (PRILOSEC) 20 MG delayed release capsule Take 1 capsule by mouth once daily in the morning 7/29/22 9/2/22  RAFAEL Perez CNP   FLUoxetine (PROZAC) 40 MG capsule Take 2 capsules by mouth in the morning. 7/28/22 9/26/22  RAFAEL Perez CNP   insulin glargine (LANTUS SOLOSTAR) 100 UNIT/ML injection pen Inject 30 Units into the skin nightly 6/30/22 9/28/22  Brianne Martinez MD   Alcohol Swabs PADS  4/27/22   Historical Provider, MD   atorvastatin (LIPITOR) 40 MG tablet Take 1 tablet by mouth nightly 4/27/22 9/26/22  Soumya Dillon MD   blood glucose monitor strips Test 2 times a day & as needed for symptoms of irregular blood glucose. Dispense sufficient amount for indicated testing frequency plus additional to accommodate PRN testing needs. 4/27/22   Soumya Dillon MD   pioglitazone (ACTOS) 30 MG tablet Take 1 tablet by mouth daily 4/27/22 9/16/22  Soumya Dillon MD   blood glucose monitor kit and supplies Dispense sufficient amount for indicated testing frequency plus additional to accommodate PRN testing needs.  Dispense all needed supplies to include: monitor, strips, lancing device, lancets, control solutions, alcohol swabs.  6/15/20   Roman Blue MD   FreeStyle Lancets MISC 1 each by Does not apply route daily 6/15/20   Roman Blue MD   acetaminophen (TYLENOL) 325 MG tablet Take 2 tablets by mouth every 4 hours as needed for Pain or Fever 2/28/18 9/2/22  Bhavana Mendez MD       Future Appointments   Date Time Provider Rosario Nesbitt   10/11/2022  9:00 AM RAFAEL David - CNP [facility]   10/11/2022  2:00 PM RAFAEL Denney CNP [facility]   11/10/2022 10:00 AM Abel Chambers [facility]   11/29/2022  1:00 PM Stephani Olszewski, MD [facility]   1/4/2023 10:00 AM Twyla Klinefelter, PSYD [facility]      and   General Assessment         No new symptoms

## 2022-10-11 ENCOUNTER — HOSPITAL ENCOUNTER (OUTPATIENT)
Dept: WOUND CARE | Age: 42
Discharge: HOME | End: 2022-10-11

## 2022-10-11 ENCOUNTER — CARE COORDINATION (OUTPATIENT)
Dept: CARE COORDINATION | Age: 42
End: 2022-10-11

## 2022-10-11 ENCOUNTER — HOSPITAL ENCOUNTER (EMERGENCY)
Age: 42
Discharge: PSYCHIATRIC OR SUBSTANCE USE FACILITY | End: 2022-10-11
Attending: EMERGENCY MEDICINE
Payer: MEDICARE

## 2022-10-11 VITALS
RESPIRATION RATE: 18 BRPM | HEART RATE: 99 BPM | TEMPERATURE: 98.7 F | OXYGEN SATURATION: 96 % | DIASTOLIC BLOOD PRESSURE: 80 MMHG | SYSTOLIC BLOOD PRESSURE: 127 MMHG

## 2022-10-11 DIAGNOSIS — R45.851 SUICIDAL IDEATION: Primary | ICD-10-CM

## 2022-10-11 LAB
ACETAMINOPHEN LEVEL: <5 UG/ML (ref 15–30)
ALBUMIN SERPL-MCNC: 3.7 GM/DL (ref 3.4–5)
ALCOHOL SCREEN SERUM: 0.03 %WT/VOL
ALCOHOL SCREEN SERUM: 0.12 %WT/VOL
ALCOHOL SCREEN SERUM: 0.26 %WT/VOL
ALP BLD-CCNC: 131 IU/L (ref 40–129)
ALT SERPL-CCNC: 20 U/L (ref 10–40)
AMPHETAMINES: NEGATIVE
ANION GAP SERPL CALCULATED.3IONS-SCNC: 14 MMOL/L (ref 4–16)
AST SERPL-CCNC: 20 IU/L (ref 15–37)
BARBITURATE SCREEN URINE: NEGATIVE
BASOPHILS ABSOLUTE: 0.1 K/CU MM
BASOPHILS RELATIVE PERCENT: 0.6 % (ref 0–1)
BENZODIAZEPINE SCREEN, URINE: NEGATIVE
BILIRUB SERPL-MCNC: 0.3 MG/DL (ref 0–1)
BUN BLDV-MCNC: 5 MG/DL (ref 6–23)
CALCIUM SERPL-MCNC: 8.7 MG/DL (ref 8.3–10.6)
CANNABINOID SCREEN URINE: NEGATIVE
CHLORIDE BLD-SCNC: 95 MMOL/L (ref 99–110)
CO2: 19 MMOL/L (ref 21–32)
COCAINE METABOLITE: NEGATIVE
CREAT SERPL-MCNC: 0.9 MG/DL (ref 0.9–1.3)
DIFFERENTIAL TYPE: ABNORMAL
DOSE AMOUNT: ABNORMAL
DOSE AMOUNT: ABNORMAL
DOSE TIME: ABNORMAL
DOSE TIME: ABNORMAL
EKG ATRIAL RATE: 117 BPM
EKG DIAGNOSIS: NORMAL
EKG P AXIS: 81 DEGREES
EKG P-R INTERVAL: 136 MS
EKG Q-T INTERVAL: 336 MS
EKG QRS DURATION: 84 MS
EKG QTC CALCULATION (BAZETT): 468 MS
EKG R AXIS: 29 DEGREES
EKG T AXIS: 1 DEGREES
EKG VENTRICULAR RATE: 117 BPM
EOSINOPHILS ABSOLUTE: 0.1 K/CU MM
EOSINOPHILS RELATIVE PERCENT: 1.7 % (ref 0–3)
GFR AFRICAN AMERICAN: >60 ML/MIN/1.73M2
GFR NON-AFRICAN AMERICAN: >60 ML/MIN/1.73M2
GLUCOSE BLD-MCNC: 190 MG/DL (ref 70–99)
GLUCOSE BLD-MCNC: 83 MG/DL (ref 70–99)
HCT VFR BLD CALC: 38.9 % (ref 42–52)
HEMOGLOBIN: 12.9 GM/DL (ref 13.5–18)
IMMATURE NEUTROPHIL %: 0.6 % (ref 0–0.43)
LYMPHOCYTES ABSOLUTE: 2.6 K/CU MM
LYMPHOCYTES RELATIVE PERCENT: 30.9 % (ref 24–44)
MAGNESIUM: 1.7 MG/DL (ref 1.8–2.4)
MCH RBC QN AUTO: 30 PG (ref 27–31)
MCHC RBC AUTO-ENTMCNC: 33.2 % (ref 32–36)
MCV RBC AUTO: 90.5 FL (ref 78–100)
MONOCYTES ABSOLUTE: 0.5 K/CU MM
MONOCYTES RELATIVE PERCENT: 6.3 % (ref 0–4)
NUCLEATED RBC %: 0 %
OPIATES, URINE: NEGATIVE
OXYCODONE: NEGATIVE
PDW BLD-RTO: 16.8 % (ref 11.7–14.9)
PHENCYCLIDINE, URINE: NEGATIVE
PLATELET # BLD: 362 K/CU MM (ref 140–440)
PMV BLD AUTO: 8.8 FL (ref 7.5–11.1)
POTASSIUM SERPL-SCNC: 3.3 MMOL/L (ref 3.5–5.1)
RBC # BLD: 4.3 M/CU MM (ref 4.6–6.2)
SALICYLATE LEVEL: <0.3 MG/DL (ref 15–30)
SARS-COV-2, NAAT: NOT DETECTED
SEGMENTED NEUTROPHILS ABSOLUTE COUNT: 5 K/CU MM
SEGMENTED NEUTROPHILS RELATIVE PERCENT: 59.9 % (ref 36–66)
SODIUM BLD-SCNC: 128 MMOL/L (ref 135–145)
SOURCE: NORMAL
TOTAL IMMATURE NEUTOROPHIL: 0.05 K/CU MM
TOTAL NUCLEATED RBC: 0 K/CU MM
TOTAL PROTEIN: 8.2 GM/DL (ref 6.4–8.2)
WBC # BLD: 8.4 K/CU MM (ref 4–10.5)

## 2022-10-11 PROCEDURE — 6370000000 HC RX 637 (ALT 250 FOR IP): Performed by: PHYSICIAN ASSISTANT

## 2022-10-11 PROCEDURE — 6360000002 HC RX W HCPCS: Performed by: PHYSICIAN ASSISTANT

## 2022-10-11 PROCEDURE — 93010 ELECTROCARDIOGRAM REPORT: CPT | Performed by: INTERNAL MEDICINE

## 2022-10-11 PROCEDURE — 96372 THER/PROPH/DIAG INJ SC/IM: CPT

## 2022-10-11 PROCEDURE — 80053 COMPREHEN METABOLIC PANEL: CPT

## 2022-10-11 PROCEDURE — G0480 DRUG TEST DEF 1-7 CLASSES: HCPCS

## 2022-10-11 PROCEDURE — 82962 GLUCOSE BLOOD TEST: CPT

## 2022-10-11 PROCEDURE — 80307 DRUG TEST PRSMV CHEM ANLYZR: CPT

## 2022-10-11 PROCEDURE — 85025 COMPLETE CBC W/AUTO DIFF WBC: CPT

## 2022-10-11 PROCEDURE — 87635 SARS-COV-2 COVID-19 AMP PRB: CPT

## 2022-10-11 PROCEDURE — 93005 ELECTROCARDIOGRAM TRACING: CPT | Performed by: EMERGENCY MEDICINE

## 2022-10-11 PROCEDURE — 99285 EMERGENCY DEPT VISIT HI MDM: CPT

## 2022-10-11 PROCEDURE — 83735 ASSAY OF MAGNESIUM: CPT

## 2022-10-11 RX ORDER — LORAZEPAM 1 MG/1
1 TABLET ORAL
Status: DISCONTINUED | OUTPATIENT
Start: 2022-10-11 | End: 2022-10-11 | Stop reason: HOSPADM

## 2022-10-11 RX ORDER — LORAZEPAM 2 MG/ML
1 INJECTION INTRAMUSCULAR
Status: DISCONTINUED | OUTPATIENT
Start: 2022-10-11 | End: 2022-10-11 | Stop reason: HOSPADM

## 2022-10-11 RX ORDER — ACETAMINOPHEN 325 MG/1
650 TABLET ORAL ONCE
Status: COMPLETED | OUTPATIENT
Start: 2022-10-11 | End: 2022-10-11

## 2022-10-11 RX ORDER — LORAZEPAM 1 MG/1
3 TABLET ORAL
Status: DISCONTINUED | OUTPATIENT
Start: 2022-10-11 | End: 2022-10-11 | Stop reason: HOSPADM

## 2022-10-11 RX ORDER — SODIUM CHLORIDE 0.9 % (FLUSH) 0.9 %
5-40 SYRINGE (ML) INJECTION PRN
Status: DISCONTINUED | OUTPATIENT
Start: 2022-10-11 | End: 2022-10-11 | Stop reason: HOSPADM

## 2022-10-11 RX ORDER — SODIUM CHLORIDE 0.9 % (FLUSH) 0.9 %
5-40 SYRINGE (ML) INJECTION EVERY 12 HOURS SCHEDULED
Status: DISCONTINUED | OUTPATIENT
Start: 2022-10-11 | End: 2022-10-11 | Stop reason: HOSPADM

## 2022-10-11 RX ORDER — LORAZEPAM 1 MG/1
4 TABLET ORAL
Status: DISCONTINUED | OUTPATIENT
Start: 2022-10-11 | End: 2022-10-11 | Stop reason: HOSPADM

## 2022-10-11 RX ORDER — LORAZEPAM 2 MG/ML
4 INJECTION INTRAMUSCULAR
Status: DISCONTINUED | OUTPATIENT
Start: 2022-10-11 | End: 2022-10-11 | Stop reason: HOSPADM

## 2022-10-11 RX ORDER — LORAZEPAM 1 MG/1
2 TABLET ORAL
Status: DISCONTINUED | OUTPATIENT
Start: 2022-10-11 | End: 2022-10-11 | Stop reason: HOSPADM

## 2022-10-11 RX ORDER — LORAZEPAM 2 MG/ML
3 INJECTION INTRAMUSCULAR
Status: DISCONTINUED | OUTPATIENT
Start: 2022-10-11 | End: 2022-10-11 | Stop reason: HOSPADM

## 2022-10-11 RX ORDER — ONDANSETRON 4 MG/1
4 TABLET, ORALLY DISINTEGRATING ORAL ONCE
Status: COMPLETED | OUTPATIENT
Start: 2022-10-11 | End: 2022-10-11

## 2022-10-11 RX ORDER — LANOLIN ALCOHOL/MO/W.PET/CERES
100 CREAM (GRAM) TOPICAL DAILY
Status: DISCONTINUED | OUTPATIENT
Start: 2022-10-11 | End: 2022-10-11 | Stop reason: HOSPADM

## 2022-10-11 RX ORDER — SODIUM CHLORIDE 9 MG/ML
INJECTION, SOLUTION INTRAVENOUS PRN
Status: DISCONTINUED | OUTPATIENT
Start: 2022-10-11 | End: 2022-10-11 | Stop reason: HOSPADM

## 2022-10-11 RX ORDER — LORAZEPAM 2 MG/ML
1 INJECTION INTRAMUSCULAR ONCE
Status: COMPLETED | OUTPATIENT
Start: 2022-10-11 | End: 2022-10-11

## 2022-10-11 RX ORDER — LORAZEPAM 2 MG/ML
2 INJECTION INTRAMUSCULAR
Status: DISCONTINUED | OUTPATIENT
Start: 2022-10-11 | End: 2022-10-11 | Stop reason: HOSPADM

## 2022-10-11 RX ADMIN — LORAZEPAM 1 MG: 2 INJECTION INTRAMUSCULAR; INTRAVENOUS at 05:01

## 2022-10-11 RX ADMIN — ACETAMINOPHEN 650 MG: 325 TABLET ORAL at 14:00

## 2022-10-11 RX ADMIN — ONDANSETRON 4 MG: 4 TABLET, ORALLY DISINTEGRATING ORAL at 02:10

## 2022-10-11 ASSESSMENT — SLEEP AND FATIGUE QUESTIONNAIRES: AVERAGE NUMBER OF SLEEP HOURS: 3

## 2022-10-11 ASSESSMENT — PAIN SCALES - GENERAL: PAINLEVEL_OUTOF10: 10

## 2022-10-11 ASSESSMENT — PAIN - FUNCTIONAL ASSESSMENT
PAIN_FUNCTIONAL_ASSESSMENT: NONE - DENIES PAIN
PAIN_FUNCTIONAL_ASSESSMENT: NONE - DENIES PAIN

## 2022-10-11 NOTE — ED PROVIDER NOTES
Emergency [address] EMERGENCY DEPARTMENT    Patient: Deborah Irving  MRN: [id number]  : 1980  Date of Evaluation: 10/11/2022  ED Supervising Physician: Deandre Leiva MD    I independently examined and evaluated Deborah Irving. In brief, Deborah Irving is a 39 y.o. male that presents to the emergency department for suicide attempt, alcohol intoxication. Patient was tased by police prior to arrival.    Focused exam: No acute distress. Clinically intoxicated with slurred speech. Suicidal ideation. Unlabored respirations. Brief ED course/MDM: Patient has no other physical/historical findings indicating the need for a further medical workup at this time. Lacerations are extremely superficial to anterior neck and do not require repair or further evaluation. There were no medical problems identified which require immediate intervention or which would preclude psychiatric evaluation. Psychiatry will be consulted once patient is clinically sober. 0600:a.m.  I have signed out [address] [place] [address] Emergency Department care to Dr. Dimitri Haney. We discussed the pertinent history, physical exam, completed/pending test results (if applicable) and current treatment plan. Please refer to his/her chart for the patients remaining Emergency Department course and final disposition. 12 lead EKG as interpreted by me reveals normal sinus rhythm. Axis is normal. There are no ischemic ST elevations or other suspicious ST changes;  QRS interval is narrow, QT interval is not prolonged. Final interpretation: Normal sinus rhythm. All diagnostic, treatment, and disposition decisions were made by myself in conjunction with the ETTA. For all further details of the patient's emergency department visit, please see their documentation.     (Please note that portions of this note may have been completed with a voice recognition program. Efforts were made to edit the dictations but occasionally words are mis-transcribed.)    Talita Doyle MD  1973 Gabo Flannery MD  10/11/22 1878

## 2022-10-11 NOTE — ED NOTES
Chief Complaint:      Suicidal Ideation with attempt     Provisional Diagnosis:   Hx Major depressive d/o per record  Hx anxiety per record   Suicidal Ideation with attempt     Risk, Psychosocial and Contextual Factors: (homeless, lack of social support etc.):       Current MH Treatment:     Hx inpatient psychiatric admission, last known admissions 2022 at [facility]     Present Suicidal Behavior:    Verbal:  SI with attempt   Attempt:  Held a machete up to this throat, superficial lac     Access to Weapons:  [place], machete and other knives    C-SSRS Current Suicide Risk: Low, Moderate or High:      High risk     Past Suicidal Behavior:    Verbal:  Hx SI per pt   Attempts:  Denies     Self-Injurious/Self-Mutilation: (Specify)  Hx cutting and scratching himself per record     Traumatic Event Within Past 2 Weeks: (Specify)   Mother  , per record \"She passed away in my arms\". Depressive symptoms since then. 2022 pt required a below knee amputation. Current Abuse:  (Specify)  Denies    Legal: (Specify)  Denies    Violence: (Specify)  Denies     Protective Factors:    Unable to assess    Housing:  Has a  who is currently trying to transition him to assisted living   Currently lives alone     Risk Factors:   Hx Major depressive d/o per record  Hx anxiety per record   Suicidal Ideation with attempt     Clinical Summary:    Pt presents to ED by police for suicide attempt and intoxication. Police presented to pt house, pt had a machete to his throat, police tased pt to prevent him from cutting his throat since he refused to drop the machete. Pt did have a superficial lac to neck from the machete. Ethanol was . 26 upon arrival at 0145. Repeat ethanol drawn at 1006 and resulted . 12. Repeat ethanol drawn at 1442 and  resulted at .03.      SI with plan and attempt as above   Denies HI  States AVH of bloated bodies and voices   States he hasn't eaten in days, poor appetite   States he only gets 2-3 hours of sleep a night   Daily alcohol use, 8-10 beers a day, maybe more, states he drinks all day, denies any hx withdrawal   UDS negative     Pt calm and cooperative, somewhat anxious   States he wants to go home, was somewhat tearful and upset when MSW told him that would not be possible currently   Good eye contact  Poor judgement, fair insight, low motivation     Level of Care Disposition:      Consulted with medical provider. Patient is medically stabilized. Consulted with patients RN about abnormalities or medical concerns. No abnormalities or medical concerns noted. Consulted with Dr Hayden White.  Patient to be admitted to inpatient psychiatric unit for safety, stabilization, observation, and medication management

## 2022-10-11 NOTE — ED PROVIDER NOTES
Patient handed off to me by colleague pending repeat alcohol level within to be evaluated by psychiatry. Patient repeat alcohol down to 0.03. Patient medically clear for behavioral health assessment. Patient has been evaluated by behavioral health and need for admission. Patient has been accepted at [facility]. ICD-10-CM    1.  Suicidal ideation  R45.851              Alba Kilgore DO  10/11/22 1643

## 2022-10-11 NOTE — ED NOTES
26 MSW went to speak to pt and complete his psych assessment. Pt laying on his side moaning stating he is in pain. Pt has a hx of alcohol abuse per record, MSW attempted to ask pt how much he currently drinks a day and whether he has ever experienced withdrawal symptoms from not drinking before. Pt stating \"I don't know\" and repeating how much pain he is in. MSW informed RN who also spoke to pt and then got pain meds ordered. MSW will continue to follow up.

## 2022-10-11 NOTE — ED NOTES
Patient is resting with eyes open. Patient has sitter at bedside. Patient is here for SI thoughts. Will continue to monitor.       Annie Polo RN  10/11/22 3422

## 2022-10-11 NOTE — ED NOTES
Patient is resting with eyes open. Patient has sitter at bedside. Patient is here for SI thoughts. Will continue to monitor.       Noelle Graf RN  10/11/22 4393

## 2022-10-11 NOTE — ED NOTES
Patient is resting with yes closed. Patient has sitter at bedside. Patient is here for SI thoughts. Will continue to monitor.       Jasmina Quigley RN  10/11/22 1000

## 2022-10-11 NOTE — ED NOTES
Patient is resting with eyes open. Patient has sitter at bedside. Patient is here for SI thoughts. Will continue to monitor.       Maribell Solitario RN  10/11/22 0986

## 2022-10-11 NOTE — ED NOTES
[facility] MSW faxed referrals to [facility], [facility], and [facility]   1620 Calls from [address] re pt. MSW and RN answered all questions.   1635 Call from [facility], pt accepted  Accepting Physician: Dr Guy Staff  Bed: Dual dx unit   N2N [phone]  MSW faxed pink slip   MSW scheduling transport   [address] [phone]

## 2022-10-11 NOTE — ED NOTES
Took over care for this patient. Patient is resting with yes closed. Patient has sitter at bedside. Patient is here for SI thoughts. Will continue to monitor.       Crow Guy RN  10/11/22 0800

## 2022-10-11 NOTE — ED PROVIDER NOTES
Patient signed out to me by Dr. Jimmy Daly,    45yom with h/o DM and bilateral BKA, police were called to house, he was holding a machete to his neck, he has abrasions, was pink slipped, repeat ETOH is at 9am. Will need evaluation and then waiting mental health placement. Qiana Burgess MD  10/11/22 7201        Patient drinks daily, feels like he is having pain all over, has never experienced withdrawal symptoms in several years because he has literally drink every single day. I have ordered CIWA, as well as Ativan as needed.    for mental health is evaluating the patient and repeat alcohol level is being drawn as he had come down to 0.12 at 10 AM.    Signed out to Dr. Jael Mendez MD  10/11/22 5139

## 2022-10-11 NOTE — ED PROVIDER NOTES
eMERGENCY dEPARTMENT eNCOUnter        [address] [facility]    Chief Complaint   Patient presents with    Suicide Attempt     Superficial laceration to neck from machete        HPI    Fatuma Mcmahan is a 39 y.o. male who presents with suicidal ideation/gesture. Patient pink-slipped by [place] FERNANDO. Patient apparently called dispatch and told them he was holding a machete to his throat and wanted to kill himself. [employer] states upon their arrival, patient's front door was open and they did find him inside with the machete to his throat. He refused to drop the knife, so they ended up tazing him. He had no LOC. Patient arrives stating that he wishes he would've just killed himself. He is crying and unable to provide further history. 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 REVIEW OF SYSTEMS    See HPI for further details. Review of systems otherwise negative. Constitutional:  Denies fever, chills. HENT:  Denies headache, dizziness, lightheadedness. Respiratory:  Denies any shortness of breath. Cardiovascular:  Denies chest pain, palpitations. GI:  Denies abdominal pain, nausea, vomiting, diarrhea. :  Denies dysuria. Musculoskeletal:  Denies edema, tenderness. Integument:  Denies rashes, lesions. Neurologic:  Denies altered mental status. Denies any numbness or tingling.     Psychiatric:  + depression, + suicidal ideation, denies homicidal ideation, denies hallucinations, denies drug abuse    PAST MEDICAL HISTORY    Past Medical History:   Diagnosis Date    Abscess of left foot 4/22/2022    Anemia associated with acute blood loss 4/26/2022    Callus of foot     Right foot - see's Dr. Macario Milan    Cellulitis of left foot 4/22/2022    Diabetic ulcer of left midfoot associated with type 2 diabetes mellitus, with muscle involvement without evidence of necrosis ([address] 4/26/2022    Diabetic ulcer of left midfoot associated with type 2 diabetes mellitus, with necrosis of muscle ([address] 6/7/2021    Diabetic ulcer of right midfoot associated with type 2 diabetes mellitus, with fat layer exposed ([facility] 8/11/2020    Dizziness     positional    Essential hypertension     Follows with PCP    Hyperlipidemia     Lumbar radiculopathy     Septic embolism ([facility] 6/13/2020    Subacute osteomyelitis of left foot ([facility] 4/22/2022       SURGICAL HISTORY    Past Surgical History:   Procedure Laterality Date    ACHILLES TENDON SURGERY Right 1/8/2021    RIGHT ACHILLES TENDON LENGTHENING REPAIR performed by Waldo Anderson DPM at [facility] [address] [facility]  03/2018    [address] [place] [address] 51    DENTAL SURGERY      teeth extractions -half per patient    HERNIA REPAIR Bilateral 5/27/2020    BIALTERAL HERNIA INGUINAL REPAIR performed by David Live MD at [address] Right 9/2/2022    LEG AMPUTATION BELOW KNEE performed by Mere Cameron MD at [address] [facility] Right 9/5/2022    LEG AMPUTATION BELOW KNEE REVISION performed by Mere Cameron MD at [facility]  2018    [place] OFFICE/OUTPT VISIT,PROCEDURE ONLY Left 4/17/2018    L5-S1 HEMILAMINECTOMY, REMOVAL OF DISC LEFT SIDE performed by Harshil Craven MD at [facility] [address] Right 1/8/2021    RIGHT TRANSMETATARSAL TOE AMPUTATION performed by Waldo Anderson DPM at [facility] [address] Left 4/23/2022    LEFT RAY GREAT TOE AMPUTATION performed by Bandar Queen MD at [address] [place] [address]     Current Outpatient Rx   Medication Sig Dispense Refill    pantoprazole (PROTONIX) 40 MG tablet       carvedilol (COREG) 12.5 MG tablet Take 1 tablet by mouth 2 times daily (with meals) 60 tablet 0    losartan (COZAAR) 100 MG tablet Take 1 tablet by mouth daily 30 tablet 0    clopidogrel (PLAVIX) 75 MG tablet Take 1 tablet by mouth daily 30 tablet 0    docusate sodium (COLACE, DULCOLAX) 100 MG CAPS Take 100 mg by mouth daily      aspirin 81 MG chewable tablet Take 81 mg by mouth daily      pantoprazole (PROTONIX) 20 MG tablet Take 1 tablet by mouth every morning (before breakfast) 30 tablet 0    sucralfate (CARAFATE) 1 GM/10ML suspension Take 10 mLs by mouth 4 times daily for 7 days 414 mL 0    glyBURIDE (DIABETA) 5 MG tablet Take 2 tablets by mouth in the morning and at bedtime 120 tablet 1    Dulaglutide 1.5 MG/0.5ML SOPN Inject 1.5 mg into the skin once a week 5 pen 3    FLUoxetine (PROZAC) 40 MG capsule Take 2 capsules by mouth in the morning. 30 capsule 3    insulin glargine (LANTUS SOLOSTAR) 100 UNIT/ML injection pen Inject 30 Units into the skin nightly 5 pen 2    Alcohol Swabs PADS       atorvastatin (LIPITOR) 40 MG tablet Take 1 tablet by mouth nightly 90 tablet 1    blood glucose monitor strips Test 2 times a day & as needed for symptoms of irregular blood glucose. Dispense sufficient amount for indicated testing frequency plus additional to accommodate PRN testing needs. 100 strip 0    blood glucose monitor kit and supplies Dispense sufficient amount for indicated testing frequency plus additional to accommodate PRN testing needs. Dispense all needed supplies to include: monitor, strips, lancing device, lancets, control solutions, alcohol swabs.  1 kit 0    FreeStyle Lancets MISC 1 each by Does not apply route daily 100 each 3       ALLERGIES    No Known Allergies    FAMILY HISTORY    Family History   Problem Relation Age of Onset    Heart Disease Father     Diabetes Father     Diabetes Mother     Heart Disease Mother     Other Mother     Kidney Disease Mother     Heart Attack Mother        SOCIAL HISTORY    Social History     Socioeconomic History    Marital status: Single     Spouse name: None    Number of children: None    Years of education: None    Highest education level: None   Tobacco Use    Smoking status: Never    Smokeless tobacco: Never   Vaping Use    Vaping Use: Never used   Substance and Sexual Activity    Alcohol use: Yes     Comment: occasionally    Drug use: No    Sexual activity: Yes     Partners: Female     Social Determinants of Health     Financial Resource Strain: Medium Risk    Difficulty of Paying Living Expenses: Somewhat hard   Food Insecurity: Food Insecurity Present    Worried About [address] in the Last Year: Sometimes true    Ran Out of Food in the Last Year: Sometimes true   Transportation Needs: No Transportation Needs    Lack of Transportation (Medical): No    Lack of Transportation (Non-Medical): No   Physical Activity: Inactive    Days of Exercise per Week: 0 days    Minutes of Exercise per Session: 0 min   Stress: Stress Concern Present    Feeling of Stress : Rather much   Social Connections: Unknown    Frequency of Communication with Friends and Family: Once a week    Attends Yarsani Services: Never    Attends Club or Organization Meetings: Never    Marital Status: Never    Housing Stability: Low Risk     Unable to Pay for Housing in the Last Year: No    Number of Places Lived in the Last Year: 1    Unstable Housing in the Last Year: No       PHYSICAL EXAM    VITAL SIGNS: /80   Pulse 99   Temp 98.7 °F (37.1 °C) (Oral)   Resp 18   SpO2 96%   Constitutional:  Well developed, well nourished, no acute distress, non-toxic appearance   Eyes:  PERRL, EOMI. Conjunctiva normal.  HENT:  NC/AT. Respiratory:  Lungs CTAB. Cardiovascular:  RRR. Musculoskeletal:  No acute deformities. Integument:  Well hydrated. Superficial linear abrasions to the anterior neck, nothing deep or bleeding. Neurologic:  Alert and oriented. Psychiatric:  Tearful.      RADIOLOGY/PROCEDURES    Labs Reviewed   ETHANOL - Abnormal; Notable for the following components:       Result Value    Alcohol Scrn 0.26 (*)     All other components within normal limits   SALICYLATE LEVEL - Abnormal; Notable for the following components:    Salicylate Lvl <1.4 (*)     All other components within normal limits   ACETAMINOPHEN LEVEL - Abnormal; Notable for the following components:    Acetaminophen Level <5.0 (*)     All other components within normal limits   CBC WITH AUTO DIFFERENTIAL - Abnormal; Notable for the following components:    RBC 4.30 (*)     Hemoglobin 12.9 (*)     Hematocrit 38.9 (*)     RDW 16.8 (*)     Monocytes % 6.3 (*)     Immature Neutrophil % 0.6 (*)     All other components within normal limits   COMPREHENSIVE METABOLIC PANEL W/ REFLEX TO MG FOR LOW K - Abnormal; Notable for the following components:    Sodium 128 (*)     Potassium 3.3 (*)     Chloride 95 (*)     CO2 19 (*)     BUN 5 (*)     Glucose 190 (*)     Alkaline Phosphatase 131 (*)     All other components within normal limits   MAGNESIUM - Abnormal; Notable for the following components:    Magnesium 1.7 (*)     All other components within normal limits   ETHANOL - Abnormal; Notable for the following components:    Alcohol Scrn 0.12 (*)     All other components within normal limits   ETHANOL - Abnormal; Notable for the following components:    Alcohol Scrn 0.03 (*)     All other components within normal limits   COVID-19, RAPID   URINE DRUG SCREEN   POCT GLUCOSE       ED COURSE & MEDICAL DECISION MAKING    Pertinent Labs & Imaging studies reviewed. (See chart for details)  -  Patient seen and evaluated in the emergency department. -  Triage and nursing notes reviewed and incorporated. -  Old chart records reviewed and incorporated. -  Patient case discussed with attending physician, Dr. Juan Miguel Medina. They saw and examined patient. -  Differential diagnosis includes: depression, suicidal, behavior disorder, schizophrenia, schizoaffective disorder, manic, dementia, delirium, alcohol intoxication, drug toxicity, and others  -  Work-up included:  see above  -  Patient awaiting medical clearance at the end of my shift. Signed out to Dr. Juan Miguel Medina. Please see his note for further details of patient encounter.       In light of current events, I did utilize appropriate PPE (including N95 and surgical face mask, safety glasses, and gloves, as recommended by the health facility/national standard best practice, during my bedside interactions with the patient. FINAL IMPRESSION    1.  Suicidal ideation              Eriberto Cisneros PA-C  10/11/22 2030

## 2022-10-11 NOTE — ED NOTES
Patient awake, agitated and restless, shaking and rocking back and forth in the fetal position. Stating multiple times \"why am I still alive, I want to die\". Sitter at bedside, in green gown.       Madeline Pichardo RN  10/11/22 1830

## 2022-10-11 NOTE — ED NOTES
Patient is resting with yes closed. Patient has sitter at bedside. Patient is here for SI thoughts. Will continue to monitor.       Luis Tan RN  10/11/22 6914

## 2022-10-11 NOTE — ED NOTES
Patient is resting with eyes closed. Patient has sitter at bedside. Patient is here for SI thoughts. Will continue to monitor.        Crow Guy RN  10/11/22 7824

## 2022-10-11 NOTE — ED NOTES
Patient is resting with yes closed. Patient has sitter at bedside. Patient is here for SI thoughts. Will continue to monitor.       Rudi Navarro RN  10/11/22 3558

## 2022-10-11 NOTE — ED NOTES
Patient is calm and cooperative and will be transported [facility] for inpatient.       Filiberto Young RN  10/11/22 2315

## 2022-10-11 NOTE — ED NOTES
Pt presented to ED intoxicated, ethanol was . 26 as on 0145 and is . 12 as of 1006. Pt unable to be assessed by psychiatry until within legal ethanol limit.

## 2022-10-11 NOTE — ED NOTES
Patient is resting with eyes closed. Patient has sitter at bedside. Patient is here for SI thoughts. Will continue to monitor.       Zakia Rodriguze RN  10/11/22 1640

## 2022-10-11 NOTE — ED NOTES
Patient is pink slipped, sitter at bedside, in green gown. Is crying and sobbing stating that he wants to kill himself and pleading for the staff to let him die. Patient came in for attempted suicide with machete to the throat.        Jasmyn Steen RN  10/11/22 0567

## 2022-10-11 NOTE — CARE COORDINATION
CM noted consult for consideration to rehab. CM also noted CIWA scale orders which automatically consults CM. Pt is here on a mental health hold with SI. CM will follow to see if CM needs to look at rehabs once mental health has seen pt.

## 2022-10-12 LAB
EKG ATRIAL RATE: 98 BPM
EKG DIAGNOSIS: NORMAL
EKG P AXIS: 64 DEGREES
EKG P-R INTERVAL: 148 MS
EKG Q-T INTERVAL: 366 MS
EKG QRS DURATION: 104 MS
EKG QTC CALCULATION (BAZETT): 467 MS
EKG R AXIS: 30 DEGREES
EKG T AXIS: 6 DEGREES
EKG VENTRICULAR RATE: 98 BPM

## 2022-10-12 PROCEDURE — 93010 ELECTROCARDIOGRAM REPORT: CPT | Performed by: INTERNAL MEDICINE

## 2022-10-13 ENCOUNTER — TELEPHONE (OUTPATIENT)
Dept: FAMILY MEDICINE CLINIC | Age: 42
End: 2022-10-13

## 2022-10-13 ENCOUNTER — CARE COORDINATION (OUTPATIENT)
Dept: CARE COORDINATION | Age: 42
End: 2022-10-13

## 2022-10-13 NOTE — CARE COORDINATION
Noted pt was admitted to [address] [place] [address],[address] and transferred to facility on 10/11 per ehr.

## 2022-10-13 NOTE — TELEPHONE ENCOUNTER
Britta Miller from [facility] returned call to [address] [place] [address]

## 2022-10-13 NOTE — TELEPHONE ENCOUNTER
Left message for Richelle Toro with  [phone]    Our office has questions re form \"waiver enrollment form is not recommended\" . ..

## 2022-10-18 ENCOUNTER — CARE COORDINATION (OUTPATIENT)
Dept: CARE COORDINATION | Age: 42
End: 2022-10-18

## 2022-10-18 NOTE — CARE COORDINATION
Attempted phone call to Pt. Phone went directly to [facility]. SW will make attempt to reach Pt in one week (10/25).

## 2022-10-20 ENCOUNTER — HOSPITAL ENCOUNTER (OUTPATIENT)
Dept: WOUND CARE | Age: 42
Discharge: HOME | End: 2022-10-20
Payer: MEDICARE

## 2022-10-20 DIAGNOSIS — E11.00 TYPE 2 DIABETES MELLITUS WITH HYPEROSMOLARITY WITHOUT COMA, WITH LONG-TERM CURRENT USE OF INSULIN (HCC): ICD-10-CM

## 2022-10-20 DIAGNOSIS — T87.89 ULCERATION OF STUMP OF ABOVE KNEE AMPUTATION OF LEFT LOWER EXTREMITY WITH NECROSIS OF MUSCLE (HCC): ICD-10-CM

## 2022-10-20 DIAGNOSIS — T87.89 NON-PRESSURE ULCER OF STUMP OF BELOW KNEE AMPUTATION OF RIGHT LOWER EXTREMITY WITH NECROSIS OF BONE (HCC): ICD-10-CM

## 2022-10-20 DIAGNOSIS — S88.119A BELOW KNEE AMPUTATION (HCC): ICD-10-CM

## 2022-10-20 DIAGNOSIS — Z79.4 TYPE 2 DIABETES MELLITUS WITH HYPEROSMOLARITY WITHOUT COMA, WITH LONG-TERM CURRENT USE OF INSULIN (HCC): ICD-10-CM

## 2022-10-20 DIAGNOSIS — T87.81 DEHISCENCE OF AMPUTATION STUMP OF RIGHT LOWER EXTREMITY (HCC): ICD-10-CM

## 2022-10-20 DIAGNOSIS — L97.912 NON-PRESSURE ULCER OF STUMP OF BELOW KNEE AMPUTATION OF RIGHT LOWER EXTREMITY WITH FAT LAYER EXPOSED (HCC): ICD-10-CM

## 2022-10-20 DIAGNOSIS — T87.89 NON-PRESSURE ULCER OF STUMP OF BELOW KNEE AMPUTATION OF RIGHT LOWER EXTREMITY WITH FAT LAYER EXPOSED (HCC): ICD-10-CM

## 2022-10-20 DIAGNOSIS — L97.123 ULCERATION OF STUMP OF ABOVE KNEE AMPUTATION OF LEFT LOWER EXTREMITY WITH NECROSIS OF MUSCLE (HCC): ICD-10-CM

## 2022-10-20 DIAGNOSIS — E11.621 DIABETIC ULCER OF LEFT MIDFOOT ASSOCIATED WITH TYPE 2 DIABETES MELLITUS, WITH FAT LAYER EXPOSED (HCC): Primary | ICD-10-CM

## 2022-10-20 DIAGNOSIS — L97.914 NON-PRESSURE ULCER OF STUMP OF BELOW KNEE AMPUTATION OF RIGHT LOWER EXTREMITY WITH NECROSIS OF BONE (HCC): ICD-10-CM

## 2022-10-20 DIAGNOSIS — L97.422 DIABETIC ULCER OF LEFT MIDFOOT ASSOCIATED WITH TYPE 2 DIABETES MELLITUS, WITH FAT LAYER EXPOSED (HCC): Primary | ICD-10-CM

## 2022-10-20 PROCEDURE — 11044 DBRDMT BONE 1ST 20 SQ CM/<: CPT | Performed by: NURSE PRACTITIONER

## 2022-10-20 PROCEDURE — 11042 DBRDMT SUBQ TIS 1ST 20SQCM/<: CPT | Performed by: NURSE PRACTITIONER

## 2022-10-20 PROCEDURE — 11044 DBRDMT BONE 1ST 20 SQ CM/<: CPT

## 2022-10-20 PROCEDURE — 11042 DBRDMT SUBQ TIS 1ST 20SQCM/<: CPT

## 2022-10-20 NOTE — DISCHARGE INSTRUCTIONS
PHYSICIAN ORDERS AND DISCHARGE INSTRUCTIONS     NOTE: Upon discharge from the [address], you will receive a patient experience survey. We would be grateful if you would take the time to fill this survey out. Wound cleansing:              Do not scrub or use excessive force. Wash hands with soap and water before and after dressing changes. Prior to applying a clean dressing, cleanse wound with normal saline, wound cleanser, or mild soap and water. Ask the physician or nurse before getting the wound(s) wet in a shower     Daily Wound management:              Keep weight off wounds and reposition every 2 hours. Avoid standing for long periods of time. Apply wraps/stockings in AM and remove at bedtime. If swelling is present, elevate legs to the level of the heart or above for 30 minutes 4-5 times a day and/or when sitting. When taking antibiotics take entire prescription as ordered by physician do not stop taking until medicine is all gone. Wound Care Notes:  Rx: Radha Stacy  Apply for grafts 05/11/22: aLL GRAFTS approved 06/08/22 for Left Toe amp site  Reapply for graft 08/22/22 for Left toes amp site   YADY's   Right 1.07      Left    1.1           Date: 08/15/22           Grafts Left Foot Amp Site  Puraply #1 Graft #1 4x4 fenestrated 06/15/22   Puraply #2 Graft #2 4x4 fenestrated 06/22/22                                    Orders for this week: 10/20/22     [facility]X ORDERS TO [facility] [phone]        Left Plantar, and Left Great toe amp site -- Clean with soap and water, pat  dry. Apply anasept gel, stimulen powder and ioplex to wound beds. Cover with Sorbex. Wrap with Coban 2 Lite. Leave in place for 1 week. Right Leg Amp Site -- Clean with soap and water, pat dry. Apply Sorbact over bone only, followed by stimulen powder over entire wound.  Cover with ABD. Wrap with Coban 2 Lite. Leave in place for 1 week. Be sure to keep dry. ORDERING WOUND VAC FOR RIGHT LEG WOUND ON 10/20/22, HOME CARE TO APPLY ONCE IT IS DELIVERED. WOUND VAC THERAPY: Right Leg Amp Site    DUODERM TO PERIWOUND FOR PROTECTION. APPLY SORBACT OVER BONE, STIMULEN TO WOUND BED AND BLACK FOAM TO WOUND BED. SECURE VAC DRESSING WITH DRAPE. SET WOUND VAC  CONTINUOUS SUCTION. CANISTER CHANGE WEEKLY OR ACCORDING TO VOLUME OF DRAINAGE. WOUND VAC DRESSING TO BE CHANGED BY HOME CARE ON  &  or Sundays. WOUND CARE CENTER WILL CHANGE ON  (IF VAC SUPPLIES SENT - NEED CANISTER AND BLACK FOAM DRESSING KIT). Follow Up Instructions: At the [address] in 1 week: Tuesday   Primary Wound Care Provider: Jadyn Baca CNP   Call  for any questions or concerns.   Central [phone]

## 2022-10-20 NOTE — PROGRESS NOTES
Multilayer Compression Wrap   (Not Unna) Below the Knee    NAME:  Yifan Winkler OF BIRTH:  1980  MEDICAL RECORD NUMBER:  [id number]  DATE:  10/20/2022    Multilayer compression wrap: Removed old Multilayer wrap if indicated and wash leg with mild soap/water. Applied moisturizing agent to dry skin as needed. Applied primary and secondary dressing as ordered. Applied multilayered dressing below the knee to right lower leg. Applied multilayered dressing below the knee to left lower leg. Instructed patient/caregiver not to remove dressing and to keep it clean and dry. Instructed patient/caregiver on complications to report to provider, such as pain, numbness in toes, heavy drainage, and slippage of dressing. Instructed patient on purpose of compression dressing and on activity and exercise recommendations.       Electronically signed by Lilian Nixon LPN on 40/33/8964 at 11:58 AM

## 2022-10-20 NOTE — PROGRESS NOTES
Wound Care Center Progress Note With Procedure    Pancho Ren  AGE: 39 y.o. GENDER: male  : 1980  EPISODE DATE:  10/20/2022     Subjective:     Chief Complaint   Patient presents with    Wound Check     Bilateral legs/feet          HISTORY of PRESENT ILLNESS      Pancho Ren is a 39 y.o. male who presents today for wound evaluation of Chronic diabetic, pressure and non-healing surgical ulcer(s) of the left medial foot and lateral plantar surface. The ulcer is of marked severity. The underlying cause of the wound is diabetes, non-healing surgical. He presents today with a new wound to the right plantar foot that is diabetic and pressure in nature and of moderate severity. The patient has significant underlying medical conditions as below. The patient is having significant depression related to the recent and sudden death of his mother. 10/20/22: The patient was here last 10/4/22 and his right BKA site was well approximated, staples removed and steri strips. The prosthetic supplier was with him and plans had started to get fitted. Unfortunately, the patient fell the same night at home and the wound dehisced. He was evaluated at the ED. The patient then was not seen at the wound clinic for a few weeks as he was hospitalized for suicidal ideation. Today able to probe and visibly see bone. Home Health still in place, will apply for a wound vac. Will also start Bactrim. 22: Since his last visit to the wound clinic 22 the patient was hospitalized -22) at [facility] with necrotizing fascitis right lower leg with resulting right BKA. 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 Miguel Mcintosh. She placed a wound VAC to on the stump at that time. On 2022 Dr. Kamala Prabhakar performed a primary closure of his right BKA stump.     Wound Pain Timing/Severity: none  Quality of pain: N/A  Severity of pain:  0 / 10   Modifying Factors: diabetes and chronic pressure  Associated Signs/Symptoms: drainage     Diabetes: Yes, on an oral and insulin regimen, last A1c 10.5 as of 7/28/22  Diabetes education provided today:    Diabetes pathoetiology, difference between type 1 and type 2 diabetes, and progressive nature of Type 2 DM. Diabetic Neuropathy: signs and therapy. Foot care: advised to wash feet daily, pat dry and apply lotion at night, avoiding between toes. Need to look at feet daily and report to a physician any signs of inflammation or skin damage. Discussed diabetes shoes and socks. Diabetic management related to wound care    Smoking: Never smoker  Obesity:No  Anticoagulant therapy: No  Immunosuppression: No    Patient educated on the 6 essential components necessary for wound healing: Circulation, Debridements, Proper Dressings and Topical Wound Products, Infection Control, Edema Control and Offloading. Patient educated on those factors that negatively effect or impact wound healing: smoking, obesity, uncontrolled diabetes, anticoagulant and immunosuppressive regimens, inadequate nutrition, untreated arterial and venous disease if applicable and measures to manage edema. Nutritional status: well nourished. Discussed need for increased protein and calories for wound healing and good sources of protein (just over 7 grams for every 20 pounds of body weight). Animal-based foods high in protein (meat, poultry, fish, eggs, and dairy foods). Plant based foods high in protein (tofu, lentils, beans, chickpeas, nuts, quinoa and den seeds. Off Loading  Offloading or minimizing or removing weight placed on an area with poor circulation such as diabetic wounds or pressure.  This can be achieved with crutches, wheel chair, knee walker etc. Minimizing pressure through partial weight bearing (minimizing the amount of  pressure applied and or the amount of time on the area of pressure) or maintaining a non-weight bearing status can be used to promote and often can be essential for thee wound to heal. Off loading may also need to be achieved for non-weight bearing wounds such as pressure ulcers to the torso. Turning and changing positions frequently, at least every two hours. Use of pressure cushion if sitting up in chair. Skin Care  Keep skin clean and well moisturized , moisturize routinely with ointments for heavier moisturizer needs for extremely dry skin or cracks such as A&D ointment and lotions for a light moisturizer such as CeraVe or Eucerin. If incontinent change incontinence garments as soon as soiled and keeping skin clean and use barrier cream to protect the skin.         PAST MEDICAL HISTORY        Diagnosis Date    Abscess of left foot 4/22/2022    Anemia associated with acute blood loss 4/26/2022    Callus of foot     Right foot - see's Dr. Zafar Sandra    Cellulitis of left foot 4/22/2022    Diabetic ulcer of left midfoot associated with type 2 diabetes mellitus, with muscle involvement without evidence of necrosis ([address] 4/26/2022    Diabetic ulcer of left midfoot associated with type 2 diabetes mellitus, with necrosis of muscle ([address] 6/7/2021    Diabetic ulcer of right midfoot associated with type 2 diabetes mellitus, with fat layer exposed ([address] 8/11/2020    Dizziness     positional    Essential hypertension     Follows with PCP    Hyperlipidemia     Lumbar radiculopathy     Septic embolism ([address] 6/13/2020    Subacute osteomyelitis of left foot ([address] 4/22/2022       PAST SURGICAL HISTORY    Past Surgical History:   Procedure Laterality Date    ACHILLES TENDON SURGERY Right 1/8/2021    RIGHT ACHILLES TENDON LENGTHENING REPAIR performed by Cassius Denny DPM at [facility]  03/2018 [facility]    DENTAL SURGERY      teeth extractions -half per patient    HERNIA REPAIR Bilateral 5/27/2020    BIALTERAL HERNIA INGUINAL REPAIR performed by Arturo Ross MD at [address] Right 9/2/2022    LEG AMPUTATION BELOW KNEE performed by Lukasz Ibarra MD at [address] Right 9/5/2022    LEG AMPUTATION BELOW KNEE REVISION performed by Lukasz Ibarra MD at [facility] 2018    [place] OFFICE/OUTPT VISIT,PROCEDURE ONLY Left 4/17/2018    L5-S1 HEMILAMINECTOMY, REMOVAL OF [address] LEFT SIDE performed by Mellissa Aguero MD at [address] Right 1/8/2021    RIGHT TRANSMETATARSAL TOE AMPUTATION performed by Pawel Singh DPM at [facility] OR    TOE AMPUTATION Left 4/23/2022    LEFT RAY GREAT TOE AMPUTATION performed by Nell Smith MD at [address]    Family History   Problem Relation Age of Onset    Heart Disease Father     Diabetes Father     Diabetes Mother     Heart Disease Mother     Other Mother     Kidney Disease Mother     Heart Attack Mother        SOCIAL HISTORY    Social History     Tobacco Use    Smoking status: Never    Smokeless tobacco: Never   Vaping Use    Vaping Use: Never used   Substance Use Topics    Alcohol use: Yes     Comment: occasionally    Drug use: No       ALLERGIES    No Known Allergies    MEDICATIONS    Current Outpatient Medications on File Prior to Encounter   Medication Sig Dispense Refill    pantoprazole (PROTONIX) 40 MG tablet       carvedilol (COREG) 12.5 MG tablet Take 1 tablet by mouth 2 times daily (with meals) 60 tablet 0    losartan (COZAAR) 100 MG tablet Take 1 tablet by mouth daily 30 tablet 0    clopidogrel (PLAVIX) 75 MG tablet Take 1 tablet by mouth daily 30 tablet 0    docusate sodium (COLACE, DULCOLAX) 100 MG CAPS Take 100 mg by mouth daily      aspirin 81 MG chewable tablet Take 81 mg by mouth daily      pantoprazole (PROTONIX) 20 MG tablet Take 1 tablet by mouth every morning (before breakfast) 30 tablet 0    [DISCONTINUED] metFORMIN (GLUCOPHAGE) 500 MG tablet Take 2 tablets by mouth 2 times daily (with meals) Indications: Start tomorrow 03/14 360 tablet 1 [DISCONTINUED] metoclopramide (REGLAN) 10 MG tablet Take 1 tablet by mouth 4 times daily (before meals and nightly) 120 tablet 3    sucralfate (CARAFATE) 1 GM/10ML suspension Take 10 mLs by mouth 4 times daily for 7 days 414 mL 0    glyBURIDE (DIABETA) 5 MG tablet Take 2 tablets by mouth in the morning and at bedtime 120 tablet 1    Dulaglutide 1.5 MG/0.5ML SOPN Inject 1.5 mg into the skin once a week 5 pen 3    [DISCONTINUED] omeprazole (PRILOSEC) 20 MG delayed release capsule Take 1 capsule by mouth once daily in the morning 90 capsule 1    FLUoxetine (PROZAC) 40 MG capsule Take 2 capsules by mouth in the morning. 30 capsule 3    insulin glargine (LANTUS SOLOSTAR) 100 UNIT/ML injection pen Inject 30 Units into the skin nightly 5 pen 2    Alcohol Swabs PADS       atorvastatin (LIPITOR) 40 MG tablet Take 1 tablet by mouth nightly 90 tablet 1    blood glucose monitor strips Test 2 times a day & as needed for symptoms of irregular blood glucose. Dispense sufficient amount for indicated testing frequency plus additional to accommodate PRN testing needs. 100 strip 0    [DISCONTINUED] pioglitazone (ACTOS) 30 MG tablet Take 1 tablet by mouth daily 90 tablet 1    blood glucose monitor kit and supplies Dispense sufficient amount for indicated testing frequency plus additional to accommodate PRN testing needs. Dispense all needed supplies to include: monitor, strips, lancing device, lancets, control solutions, alcohol swabs. 1 kit 0    FreeStyle Lancets MISC 1 each by Does not apply route daily 100 each 3    [DISCONTINUED] acetaminophen (TYLENOL) 325 MG tablet Take 2 tablets by mouth every 4 hours as needed for Pain or Fever 120 tablet 3     No current facility-administered medications on file prior to encounter. REVIEW OF SYSTEMS    Pertinent items are noted in HPI. Constitutional: Negative for systemic symptoms including fever, chills and malaise. Objective:       There were no vitals taken for this visit.    PHYSICAL EXAM      General: The patient is in no acute distress. Mental status:  Patient is appropriate, is  oriented to place and plan of care. Dermatologic exam: Visual inspection of the periwound reveals the skin to be normal in turgor and texture  Wound exam: see wound description below in procedure note      Assessment:     Problem List Items Addressed This Visit          Endocrine    WD-Diabetic ulcer of left midfoot associated with type 2 diabetes mellitus, with fat layer exposed ([address] - Primary    Type 2 diabetes mellitus, with long-term current use of insulin (HCC)       Other    Below knee amputation ([address]    WD-Dehiscence of amputation stump of right lower extremity ([address]    Non-pressure ulcer of stump of below knee amputation of right lower extremity with fat layer exposed ([address]    RESOLVED: Non-pressure ulcer of stump of below knee amputation of right lower extremity with necrosis of bone (HCC)    RESOLVED: WD-Ulceration of stump of above knee amputation of left lower extremity with necrosis of muscle ([address]       Procedure Note    Indications:  Based on my examination of this patient's wound(s) today, sharp excision into necrotic subcutaneous tissue is required to promote healing and evaluate the extent of previous healing. Performed by: RAFAEL Austin CNP    Consent obtained: Yes    Time out taken:  Yes    Pain Control: Anesthetic  Anesthetic: 4% Lidocaine Liquid Topical      Debridement:Excisional Debridement    Using curette and tissue nippers the wound(s) was/were sharply debrided down through and including the removal of subcutaneous tissue.         Devitalized Tissue Debrided:  slough, exudate and callus    Pre Debridement Measurements:  Are located in the Wound Documentation Flow Sheet    All active wounds listed below with today's date are evaluated  Wound(s)    debrided this date include # : 1, 2     Post  Debridement Measurements:  Wound 05/11/22 #1 (onset 2 weeks) Left Medial Foot Amp Site (Active)   Wound Image   10/20/22 1101   Wound Etiology Non-Healing Surgical 10/20/22 1153   Dressing Status New dressing applied;Dry;Clean; Intact 10/20/22 1153   Wound Cleansed Soap and water 10/20/22 1101   Offloading for Diabetic Foot Ulcers Diabetic shoes/inserts 10/20/22 1153   Dressing Change Due 09/16/22 09/24/22 0300   Wound Length (cm) 3.5 cm 10/20/22 1101   Wound Width (cm) 0.5 cm 10/20/22 1101   Wound Depth (cm) 0.1 cm 10/20/22 1101   Wound Surface Area (cm^2) 1.75 cm^2 10/20/22 1101   Change in Wound Size % (l*w) 96.6 10/20/22 1101   Wound Volume (cm^3) 0.175 cm^3 10/20/22 1101   Wound Healing % 99 10/20/22 1101   Post-Procedure Length (cm) 3.5 cm 10/20/22 1141   Post-Procedure Width (cm) 0.5 cm 10/20/22 1141   Post-Procedure Depth (cm) 0.1 cm 10/20/22 1141   Post-Procedure Surface Area (cm^2) 1.75 cm^2 10/20/22 1141   Post-Procedure Volume (cm^3) 0.175 cm^3 10/20/22 1141   Distance Tunneling (cm) 0 cm 10/20/22 1101   Tunneling Position ___ O'Clock 0 10/20/22 1101   Undermining Starts ___ O'Clock 0 10/20/22 1101   Undermining Ends___ O'Clock 0 10/20/22 1101   Undermining Maxium Distance (cm) 0 10/20/22 1101   Wound Assessment Devitalized tissue;Pink/red;Slough 10/20/22 1101   Drainage Amount Moderate 10/20/22 1101   Drainage Description Serosanguinous; Yellow 10/20/22 1101   Odor None 10/20/22 1101   Rosalie-wound Assessment Hyperkeratosis (callous); Maceration 10/20/22 1101   Margins Defined edges 10/20/22 1101   Wound Thickness Description not for Pressure Injury Full thickness 10/20/22 1101   Number of days: 162       Wound 05/11/22 #2 (onset unknown) Left Plantar (Active)   Wound Image   10/20/22 1101   Wound Etiology Diabetic 10/20/22 1153   Dressing Status New dressing applied;Clean;Dry; Intact 10/20/22 1153   Wound Cleansed Soap and water 10/20/22 1101   Offloading for Diabetic Foot Ulcers Diabetic shoes/inserts 10/20/22 1153   Dressing Change Due 09/04/22 09/24/22 0300   Wound Length (cm) 1.2 cm 10/20/22 1101   Wound Width (cm) 0.8 cm 10/20/22 1101   Wound Depth (cm) 0.2 cm 10/20/22 1101   Wound Surface Area (cm^2) 0.96 cm^2 10/20/22 1101   Change in Wound Size % (l*w) 40 10/20/22 1101   Wound Volume (cm^3) 0.192 cm^3 10/20/22 1101   Wound Healing % 40 10/20/22 1101   Post-Procedure Length (cm) 1.2 cm 10/20/22 1141   Post-Procedure Width (cm) 0.8 cm 10/20/22 1141   Post-Procedure Depth (cm) 0.2 cm 10/20/22 1141   Post-Procedure Surface Area (cm^2) 0.96 cm^2 10/20/22 1141   Post-Procedure Volume (cm^3) 0.192 cm^3 10/20/22 1141   Distance Tunneling (cm) 0 cm 10/20/22 1101   Tunneling Position ___ O'Clock 0 10/20/22 1101   Undermining Starts ___ O'Clock 0 10/20/22 1101   Undermining Ends___ O'Clock 0 10/20/22 1101   Undermining Maxium Distance (cm) 0 10/20/22 1101   Wound Assessment Pink/red 10/20/22 1101   Drainage Amount Moderate 10/20/22 1101   Drainage Description Serosanguinous 10/20/22 1101   Odor None 10/20/22 1101   Rosalie-wound Assessment Hyperkeratosis (callous) 10/20/22 1101   Margins Defined edges 10/20/22 1101   Wound Thickness Description not for Pressure Injury Full thickness 10/20/22 1101   Number of days: 162       Percent of Wound(s) Debrided: approximately 100%    Total  Area  Debrided: 2.71 sq cm     Bleeding:  Minimal    Hemostasis Achieved:  not needed    Procedural Pain:  0  / 10     Post Procedural Pain:  0 / 10     Response to treatment:  Well tolerated by patient. Procedure Note    Indications:  Based on my examination of this patient's wound(s) today, sharp excision into necrotic bone is required to promote healing and evaluate the extent of previous healing.     Performed by: RAFAEL Tanner CNP    Consent obtained: Yes    Time out taken:  Yes    Pain Control: Anesthetic  Anesthetic: 4% Lidocaine Liquid Topical        Debridement:Excisional Debridement    Using curette, #15 blade scalpel, and forceps the wound(s) was/were sharply debrided down through and including the removal of bone. Devitalized Tissue Debrided:  fibrin, biofilm, slough, necrotic/eschar, and exudate    Pre Debridement Measurements:  Are located in the Wound Documentation Flow Sheet    All active wounds listed below with today's date are evaluated  Wound(s)    debrided this date include # : 3     Post  Debridement Measurements:  Wound 10/20/22 #3 right amp site. (Active)   Wound Image   10/20/22 1105   Wound Etiology Non-Healing Surgical 10/20/22 1153   Dressing Status New dressing applied;Clean;Dry; Intact 10/20/22 1153   Wound Cleansed Wound cleanser 10/20/22 1105   Offloading for Diabetic Foot Ulcers Offloading not required 10/20/22 1153   Wound Length (cm) 1.4 cm 10/20/22 1105   Wound Width (cm) 4 cm 10/20/22 1105   Wound Depth (cm) 0.4 cm 10/20/22 1105   Wound Surface Area (cm^2) 5.6 cm^2 10/20/22 1105   Wound Volume (cm^3) 2.24 cm^3 10/20/22 1105   Post-Procedure Length (cm) 1.4 cm 10/20/22 1141   Post-Procedure Width (cm) 4 cm 10/20/22 1141   Post-Procedure Depth (cm) 0.4 cm 10/20/22 1141   Post-Procedure Surface Area (cm^2) 5.6 cm^2 10/20/22 1141   Post-Procedure Volume (cm^3) 2.24 cm^3 10/20/22 1141   Distance Tunneling (cm) 0 cm 10/20/22 1105   Tunneling Position ___ O'Clock 0 10/20/22 1105   Undermining Starts ___ O'Clock 0 10/20/22 1105   Undermining Ends___ O'Clock 0 10/20/22 1105   Undermining Maxium Distance (cm) 0 10/20/22 1105   Wound Assessment Granulation tissue; Exposed structure bone 10/20/22 1105   Drainage Amount Moderate 10/20/22 1105   Drainage Description Serosanguinous 10/20/22 1105   Odor None 10/20/22 1105   Rosalie-wound Assessment Intact 10/20/22 1105   Margins Attached edges 10/20/22 1105   Wound Thickness Description not for Pressure Injury Full thickness 10/20/22 1105   Number of days: 0       Percent of Wound(s) Debrided: approximately 100%    Total  Area  Debrided:  2.24 sq cm     Bleeding:  Minimal    Hemostasis Achieved:  by pressure    Procedural Pain:  0  / 10     Post Procedural Pain:  0 / 10     Response to treatment:  Well tolerated by patient. Status of wound progress and description from last visit: The wounds on the left foot are clean with no S&S infection. Regimen as below. Right BKA with necrotizing fascitis, the BKA site now with dehiscence. The patient continues to orellana depression post his mother's death. Advanced Modality Checklist: Skin substitutes    [x] Yes []  No  Is the wound Greater than 1.0 sq cm  [x] Yes []  No  Has the wound had Documented Treatment for 30 days ? [] Yes [x]  No  Recurrent Wound with Skin Substitute with Last Year?  [] Yes [x]  No  Radiographic testing in the last 3 months? [] Yes [x]  No  Vascular Assessment in the last 6 months? [x] Yes []  No  Smoking Status  [x] Yes []  No  Nutrition Assessed  [x] Yes []  No  Wound Free from infection   [x] Yes []  No  Is wound free of eschar, slough, and/or Bio [place]? [] Yes [x]  No  Malignant Process in Wound  [] Yes []  No  Hemoglobin A1C in the last 3 months?  last A1c 10.4 as of 4/26/22  [x] Yes []  No  Off loading Diabetic Foot Ulcer? [x] Yes []  No Compression Therapy of 20 mmHg or Greater? Plan:       Discharge Instructions         PHYSICIAN ORDERS AND DISCHARGE INSTRUCTIONS     NOTE: Upon discharge from the [address], you will receive a patient experience survey. We would be grateful if you would take the time to fill this survey out. Wound cleansing:              Do not scrub or use excessive force. Wash hands with soap and water before and after dressing changes. Prior to applying a clean dressing, cleanse wound with normal saline, wound cleanser, or mild soap and water. Ask the physician or nurse before getting the wound(s) wet in a shower     Daily Wound management:              Keep weight off wounds and reposition every 2 hours. Avoid standing for long periods of time.               Apply wraps/stockings in AM and remove at bedtime. If swelling is present, elevate legs to the level of the heart or above for 30 minutes 4-5 times a day and/or when sitting. When taking antibiotics take entire prescription as ordered by physician do not stop taking until medicine is all gone. Wound Care Notes:  Rx: Robyn Grover  Apply for grafts 05/11/22: aLL GRAFTS approved 06/08/22 for Left Toe amp site  Reapply for graft 08/22/22 for Left toes amp site   YADY's   Right 1.07      Left    1.1           Date: 08/15/22           Grafts Left Foot Amp Site  Puraply #1 Graft #1 4x4 fenestrated 06/15/22   Puraply #2 Graft #2 4x4 fenestrated 06/22/22                                    Orders for this week: 10/20/22     FAX ORDERS TO [facility] [phone]        Left Plantar, and Left Great toe amp site -- Clean with soap and water, pat  dry. Apply anasept gel, stimulen powder and ioplex to wound beds. Cover with Sorbex. Wrap with Coban 2 Lite. Leave in place for 1 week. Right Leg Amp Site -- Clean with soap and water, pat dry. Apply Sorbact over bone only, followed by stimulen powder over entire wound. Cover with ABD. Wrap with Coban 2 Lite. Leave in place for 1 week. Be sure to keep dry. ORDERING WOUND VAC FOR RIGHT LEG WOUND ON 10/20/22, HOME CARE TO APPLY ONCE IT IS DELIVERED. WOUND VAC THERAPY: Right Leg Amp Site    DUODERM TO PERIWOUND FOR PROTECTION. APPLY SORBACT OVER BONE, STIMULEN TO WOUND BED AND BLACK FOAM TO WOUND BED. SECURE VAC DRESSING WITH DRAPE. SET WOUND VAC  CONTINUOUS SUCTION. CANISTER CHANGE WEEKLY OR ACCORDING TO VOLUME OF DRAINAGE. WOUND VAC DRESSING TO BE CHANGED BY HOME CARE ON Tuesdays & Saturdays or Sundays. WOUND CARE CENTER WILL CHANGE ON FRIDAYS (IF VAC SUPPLIES SENT - NEED CANISTER AND BLACK FOAM DRESSING KIT). Follow Up Instructions:  At the [address] in 1 week: Tuesday   Primary Wound Care Provider: Lisa Elizabeth CNP   Call  for any questions or concerns. Central [phone]        Treatment Note Wound 22 #1 (onset 2 weeks) Left Medial Foot Amp Site-Dressing/Treatment:  (Anasept gel, Stimulen, Ioples, Sorbex, Coban 2 lite)  Wound 22 #2 (onset unknown) Left Plantar-Dressing/Treatment:  (Anasept gel, Stimulen, Ioples, Sorbex, Coban 2 lite)  Wound 10/20/22 #3 right amp site. -Dressing/Treatment:  (sorbact, stimulen,abd,coban  2 lite)    Written Patient Dismissal Instructions Given            Electronically signed by RAFAEL Yin CNP on 10/20/2022 at 12:40 PM

## 2022-10-25 ENCOUNTER — CARE COORDINATION (OUTPATIENT)
Dept: CARE COORDINATION | Age: 42
End: 2022-10-25

## 2022-10-25 ENCOUNTER — HOSPITAL ENCOUNTER (OUTPATIENT)
Dept: WOUND CARE | Age: 42
Discharge: HOME | End: 2022-10-25
Payer: MEDICARE

## 2022-10-25 VITALS
HEART RATE: 95 BPM | SYSTOLIC BLOOD PRESSURE: 151 MMHG | RESPIRATION RATE: 16 BRPM | TEMPERATURE: 98.3 F | DIASTOLIC BLOOD PRESSURE: 97 MMHG

## 2022-10-25 DIAGNOSIS — L97.422 DIABETIC ULCER OF LEFT MIDFOOT ASSOCIATED WITH TYPE 2 DIABETES MELLITUS, WITH FAT LAYER EXPOSED (HCC): Primary | ICD-10-CM

## 2022-10-25 DIAGNOSIS — Z89.432 PARTIAL NONTRAUMATIC AMPUTATION OF FOOT, LEFT (HCC): ICD-10-CM

## 2022-10-25 DIAGNOSIS — E11.00 TYPE 2 DIABETES MELLITUS WITH HYPEROSMOLARITY WITHOUT COMA, WITH LONG-TERM CURRENT USE OF INSULIN (HCC): ICD-10-CM

## 2022-10-25 DIAGNOSIS — Z79.4 TYPE 2 DIABETES MELLITUS WITH HYPEROSMOLARITY WITHOUT COMA, WITH LONG-TERM CURRENT USE OF INSULIN (HCC): ICD-10-CM

## 2022-10-25 DIAGNOSIS — Z89.511 S/P BKA (BELOW KNEE AMPUTATION), RIGHT (HCC): ICD-10-CM

## 2022-10-25 DIAGNOSIS — E11.621 DIABETIC ULCER OF LEFT MIDFOOT ASSOCIATED WITH TYPE 2 DIABETES MELLITUS, WITH FAT LAYER EXPOSED (HCC): Primary | ICD-10-CM

## 2022-10-25 DIAGNOSIS — T87.81 DEHISCENCE OF AMPUTATION STUMP OF RIGHT LOWER EXTREMITY (HCC): ICD-10-CM

## 2022-10-25 DIAGNOSIS — E11.621 DIABETIC ULCER OF TOE OF LEFT FOOT ASSOCIATED WITH TYPE 2 DIABETES MELLITUS, WITH FAT LAYER EXPOSED (HCC): ICD-10-CM

## 2022-10-25 DIAGNOSIS — L97.522 DIABETIC ULCER OF TOE OF LEFT FOOT ASSOCIATED WITH TYPE 2 DIABETES MELLITUS, WITH FAT LAYER EXPOSED (HCC): ICD-10-CM

## 2022-10-25 DIAGNOSIS — T87.89 NON-PRESSURE ULCER OF STUMP OF BELOW KNEE AMPUTATION OF RIGHT LOWER EXTREMITY WITH FAT LAYER EXPOSED (HCC): ICD-10-CM

## 2022-10-25 DIAGNOSIS — L97.912 NON-PRESSURE ULCER OF STUMP OF BELOW KNEE AMPUTATION OF RIGHT LOWER EXTREMITY WITH FAT LAYER EXPOSED (HCC): ICD-10-CM

## 2022-10-25 PROCEDURE — 11044 DBRDMT BONE 1ST 20 SQ CM/<: CPT | Performed by: NURSE PRACTITIONER

## 2022-10-25 PROCEDURE — 97605 NEG PRS WND THER DME<=50SQCM: CPT

## 2022-10-25 PROCEDURE — 11042 DBRDMT SUBQ TIS 1ST 20SQCM/<: CPT

## 2022-10-25 PROCEDURE — 11042 DBRDMT SUBQ TIS 1ST 20SQCM/<: CPT | Performed by: NURSE PRACTITIONER

## 2022-10-25 RX ORDER — CLOBETASOL PROPIONATE 0.5 MG/G
OINTMENT TOPICAL ONCE
Status: CANCELLED | OUTPATIENT
Start: 2022-10-25 | End: 2022-10-25

## 2022-10-25 RX ORDER — GINSENG 100 MG
CAPSULE ORAL ONCE
Status: CANCELLED | OUTPATIENT
Start: 2022-10-25 | End: 2022-10-25

## 2022-10-25 RX ORDER — LIDOCAINE HYDROCHLORIDE 20 MG/ML
JELLY TOPICAL ONCE
Status: CANCELLED | OUTPATIENT
Start: 2022-10-25 | End: 2022-10-25

## 2022-10-25 RX ORDER — BETAMETHASONE DIPROPIONATE 0.05 %
OINTMENT (GRAM) TOPICAL ONCE
Status: CANCELLED | OUTPATIENT
Start: 2022-10-25 | End: 2022-10-25

## 2022-10-25 RX ORDER — LIDOCAINE HYDROCHLORIDE 40 MG/ML
SOLUTION TOPICAL ONCE
Status: CANCELLED | OUTPATIENT
Start: 2022-10-25 | End: 2022-10-25

## 2022-10-25 RX ORDER — GENTAMICIN SULFATE 1 MG/G
OINTMENT TOPICAL ONCE
Status: CANCELLED | OUTPATIENT
Start: 2022-10-25 | End: 2022-10-25

## 2022-10-25 RX ORDER — BACITRACIN, NEOMYCIN, POLYMYXIN B 400; 3.5; 5 [USP'U]/G; MG/G; [USP'U]/G
OINTMENT TOPICAL ONCE
Status: CANCELLED | OUTPATIENT
Start: 2022-10-25 | End: 2022-10-25

## 2022-10-25 RX ORDER — BACITRACIN ZINC AND POLYMYXIN B SULFATE 500; 1000 [USP'U]/G; [USP'U]/G
OINTMENT TOPICAL ONCE
Status: CANCELLED | OUTPATIENT
Start: 2022-10-25 | End: 2022-10-25

## 2022-10-25 RX ORDER — LIDOCAINE 40 MG/G
CREAM TOPICAL ONCE
Status: CANCELLED | OUTPATIENT
Start: 2022-10-25 | End: 2022-10-25

## 2022-10-25 RX ORDER — LIDOCAINE 50 MG/G
OINTMENT TOPICAL ONCE
Status: CANCELLED | OUTPATIENT
Start: 2022-10-25 | End: 2022-10-25

## 2022-10-25 ASSESSMENT — PAIN SCALES - GENERAL: PAINLEVEL_OUTOF10: 5

## 2022-10-25 ASSESSMENT — PAIN DESCRIPTION - ORIENTATION: ORIENTATION: RIGHT

## 2022-10-25 ASSESSMENT — PAIN DESCRIPTION - DESCRIPTORS: DESCRIPTORS: ACHING

## 2022-10-25 ASSESSMENT — PAIN - FUNCTIONAL ASSESSMENT: PAIN_FUNCTIONAL_ASSESSMENT: PREVENTS OR INTERFERES WITH MANY ACTIVE NOT PASSIVE ACTIVITIES

## 2022-10-25 ASSESSMENT — PAIN SCALES - WONG BAKER: WONGBAKER_NUMERICALRESPONSE: 4

## 2022-10-25 ASSESSMENT — PAIN DESCRIPTION - LOCATION: LOCATION: LEG

## 2022-10-25 NOTE — PROGRESS NOTES
Wound Care Center Progress Note With Procedure    Bridgette Sky  AGE: 43 y.o. GENDER: male  : 1980  EPISODE DATE:  10/25/2022     Subjective:     Chief Complaint   Patient presents with    Wound Check     Right lower leg amp site          HISTORY of PRESENT ILLNESS      Bridgette Sky is a 43 y.o. male who presents today for wound evaluation of Chronic diabetic, pressure and non-healing surgical ulcer(s) of the left medial foot and lateral plantar surface. The ulcer is of marked severity. The underlying cause of the wound is diabetes, non-healing surgical. He presents today with a new wound to the right plantar foot that is diabetic and pressure in nature and of moderate severity. The patient has significant underlying medical conditions as below. The patient is having significant depression related to the recent and sudden death of his mother. 10/20/22: The patient was here last 10/4/22 and his right BKA site was well approximated, staples removed and steri strips. The prosthetic supplier was with him and plans had started to get fitted. Unfortunately, the patient fell the same night at home and the wound dehisced. He was evaluated at the ED. The patient then was not seen at the wound clinic for a few weeks as he was hospitalized for suicidal ideation. Today able to probe and visibly see bone. Home Health still in place, will apply for a wound vac. Will also start Bactrim. 22: Since his last visit to the wound clinic 22 the patient was hospitalized -22) at [facility] with necrotizing fascitis right lower leg with resulting right BKA. 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 Adal Still. She placed a wound VAC to on the stump at that time. On 2022 Dr. Kiarra Angeles performed a primary closure of his right BKA stump.     Wound Pain Timing/Severity: none  Quality of pain: N/A  Severity of pain:  0 / 10   Modifying Factors: diabetes and chronic pressure  Associated Signs/Symptoms: drainage     Diabetes: Yes, on an oral and insulin regimen, last A1c 10.5 as of 7/28/22  Diabetes education provided today:    Diabetes pathoetiology, difference between type 1 and type 2 diabetes, and progressive nature of Type 2 DM. Diabetic Neuropathy: signs and therapy. Foot care: advised to wash feet daily, pat dry and apply lotion at night, avoiding between toes. Need to look at feet daily and report to a physician any signs of inflammation or skin damage. Discussed diabetes shoes and socks. Diabetic management related to wound care    Smoking: Never smoker  Obesity:No  Anticoagulant therapy: No  Immunosuppression: No    Patient educated on the 6 essential components necessary for wound healing: Circulation, Debridements, Proper Dressings and Topical Wound Products, Infection Control, Edema Control and Offloading. Patient educated on those factors that negatively effect or impact wound healing: smoking, obesity, uncontrolled diabetes, anticoagulant and immunosuppressive regimens, inadequate nutrition, untreated arterial and venous disease if applicable and measures to manage edema. Nutritional status: well nourished. Discussed need for increased protein and calories for wound healing and good sources of protein (just over 7 grams for every 20 pounds of body weight). Animal-based foods high in protein (meat, poultry, fish, eggs, and dairy foods). Plant based foods high in protein (tofu, lentils, beans, chickpeas, nuts, quinoa and den seeds. Off Loading  Offloading or minimizing or removing weight placed on an area with poor circulation such as diabetic wounds or pressure.  This can be achieved with crutches, wheel chair, knee walker etc. Minimizing pressure through partial weight bearing (minimizing the amount of  pressure applied and or the amount of time on the area of pressure) or maintaining a non-weight bearing status can be used to promote and often can be essential for thee wound to heal. Off loading may also need to be achieved for non-weight bearing wounds such as pressure ulcers to the torso. Turning and changing positions frequently, at least every two hours. Use of pressure cushion if sitting up in chair. Skin Care  Keep skin clean and well moisturized , moisturize routinely with ointments for heavier moisturizer needs for extremely dry skin or cracks such as A&D ointment and lotions for a light moisturizer such as CeraVe or Eucerin. If incontinent change incontinence garments as soon as soiled and keeping skin clean and use barrier cream to protect the skin.         PAST MEDICAL HISTORY        Diagnosis Date    Abscess of left foot 4/22/2022    Anemia associated with acute blood loss 4/26/2022    Callus of foot     Right foot - see's Dr. Macario Milan    Cellulitis of left foot 4/22/2022    Diabetic ulcer of left midfoot associated with type 2 diabetes mellitus, with muscle involvement without evidence of necrosis ([address] 4/26/2022    Diabetic ulcer of left midfoot associated with type 2 diabetes mellitus, with necrosis of muscle ([address] 6/7/2021    Diabetic ulcer of right midfoot associated with type 2 diabetes mellitus, with fat layer exposed ([address] 8/11/2020    Dizziness     positional    Essential hypertension     Follows with PCP    Hyperlipidemia     Lumbar radiculopathy     Septic embolism ([address] 6/13/2020    Subacute osteomyelitis of left foot ([address] 4/22/2022       PAST SURGICAL HISTORY    Past Surgical History:   Procedure Laterality Date    ACHILLES TENDON SURGERY Right 1/8/2021    RIGHT ACHILLES TENDON LENGTHENING REPAIR performed by Marina Gould DPM at [address]  03/2018    [address] [place] [address] 51    DENTAL SURGERY      teeth extractions -half per patient    HERNIA REPAIR Bilateral 5/27/2020    BIALTERAL HERNIA INGUINAL REPAIR performed by Briana Yi MD at [address] Right 9/2/2022    LEG AMPUTATION BELOW KNEE performed by Esteban Seals MD at [address] Right 9/5/2022    LEG AMPUTATION BELOW KNEE REVISION performed by Esteban Seals MD at [facility] 30 2018    SC OFFICE/OUTPT VISIT,PROCEDURE ONLY Left 4/17/2018    L5-S1 HEMILAMINECTOMY, REMOVAL OF [address] LEFT SIDE performed by Neela Waters MD at [address] Right 1/8/2021    RIGHT TRANSMETATARSAL TOE AMPUTATION performed by Ella Pollard DPM at [facility] OR    TOE AMPUTATION Left 4/23/2022    LEFT RAY GREAT TOE AMPUTATION [facility]    Family History   Problem Relation Age of Onset    Heart Disease Father     Diabetes Father     Diabetes Mother     Heart Disease Mother     Other Mother     Kidney Disease Mother     Heart Attack Mother        SOCIAL HISTORY    Social History     Tobacco Use    Smoking status: Never    Smokeless tobacco: Never   Vaping Use    Vaping Use: Never used   Substance Use Topics    Alcohol use: Yes     Comment: occasionally    Drug use: No       ALLERGIES    No Known Allergies    MEDICATIONS    Current Outpatient Medications on File Prior to Encounter   Medication Sig Dispense Refill    pantoprazole (PROTONIX) 40 MG tablet       carvedilol (COREG) 12.5 MG tablet Take 1 tablet by mouth 2 times daily (with meals) 60 tablet 0    losartan (COZAAR) 100 MG tablet Take 1 tablet by mouth daily 30 tablet 0    clopidogrel (PLAVIX) 75 MG tablet Take 1 tablet by mouth daily 30 tablet 0    docusate sodium (COLACE, DULCOLAX) 100 MG CAPS Take 100 mg by mouth daily      aspirin 81 MG chewable tablet Take 81 mg by mouth daily      pantoprazole (PROTONIX) 20 MG tablet Take 1 tablet by mouth every morning (before breakfast) 30 tablet 0    [DISCONTINUED] metFORMIN (GLUCOPHAGE) 500 MG tablet Take 2 tablets by mouth 2 times daily (with meals) Indications: Start tomorrow 03/14 360 tablet 1 [DISCONTINUED] metoclopramide (REGLAN) 10 MG tablet Take 1 tablet by mouth 4 times daily (before meals and nightly) 120 tablet 3    sucralfate (CARAFATE) 1 GM/10ML suspension Take 10 mLs by mouth 4 times daily for 7 days 414 mL 0    glyBURIDE (DIABETA) 5 MG tablet Take 2 tablets by mouth in the morning and at bedtime 120 tablet 1    Dulaglutide 1.5 MG/0.5ML SOPN Inject 1.5 mg into the skin once a week 5 pen 3    [DISCONTINUED] omeprazole (PRILOSEC) 20 MG delayed release capsule Take 1 capsule by mouth once daily in the morning 90 capsule 1    FLUoxetine (PROZAC) 40 MG capsule Take 2 capsules by mouth in the morning. 30 capsule 3    insulin glargine (LANTUS SOLOSTAR) 100 UNIT/ML injection pen Inject 30 Units into the skin nightly 5 pen 2    Alcohol Swabs PADS       atorvastatin (LIPITOR) 40 MG tablet Take 1 tablet by mouth nightly 90 tablet 1    blood glucose monitor strips Test 2 times a day & as needed for symptoms of irregular blood glucose. Dispense sufficient amount for indicated testing frequency plus additional to accommodate PRN testing needs. 100 strip 0    [DISCONTINUED] pioglitazone (ACTOS) 30 MG tablet Take 1 tablet by mouth daily 90 tablet 1    blood glucose monitor kit and supplies Dispense sufficient amount for indicated testing frequency plus additional to accommodate PRN testing needs. Dispense all needed supplies to include: monitor, strips, lancing device, lancets, control solutions, alcohol swabs. 1 kit 0    FreeStyle Lancets MISC 1 each by Does not apply route daily 100 each 3    [DISCONTINUED] acetaminophen (TYLENOL) 325 MG tablet Take 2 tablets by mouth every 4 hours as needed for Pain or Fever 120 tablet 3     No current facility-administered medications on file prior to encounter. REVIEW OF SYSTEMS    Pertinent items are noted in HPI. Constitutional: Negative for systemic symptoms including fever, chills and malaise.       Objective:      BP (!) 151/97   Pulse 95   Temp 98.3 °F (36.8 °C) (Temporal)   Resp 16     PHYSICAL EXAM      General: The patient is in no acute distress. Mental status:  Patient is appropriate, is  oriented to place and plan of care. Dermatologic exam: Visual inspection of the periwound reveals the skin to be normal in turgor and texture  Wound exam: see wound description below in procedure note      Assessment:     Problem List Items Addressed This Visit          Endocrine    WD-Diabetic ulcer of left midfoot associated with type 2 diabetes mellitus, with fat layer exposed ([address] - Primary    Relevant Orders    Initiate Outpatient Wound Care Protocol    Neg. Pressure Wound Therapy    Diabetic ulcer of toe of left foot associated with type 2 diabetes mellitus, with fat layer exposed ([address]    Relevant Orders    Initiate Outpatient Wound Care Protocol    Neg. Pressure Wound Therapy    Type 2 diabetes mellitus, with long-term current use of insulin (HCC)       Other    WD-Partial nontraumatic amputation of foot, left ([address]    Relevant Orders    Initiate Outpatient Wound Care Protocol    Neg. Pressure Wound Therapy    WD-S/P BKA (below knee amputation), right [facility])    WD-Dehiscence of amputation stump of right lower extremity ([address]    Non-pressure ulcer of stump of below knee amputation of right lower extremity with fat layer exposed ([address]       Procedure Note    Indications:  Based on my examination of this patient's wound(s) today, sharp excision into necrotic subcutaneous tissue is required to promote healing and evaluate the extent of previous healing. Performed by: RAFAEL Sandoval CNP    Consent obtained: Yes    Time out taken:  Yes    Pain Control: Anesthetic  Anesthetic: 4% Lidocaine Liquid Topical      Debridement:Excisional Debridement    Using curette and tissue nippers the wound(s) was/were sharply debrided down through and including the removal of subcutaneous tissue.         Devitalized Tissue Debrided:  slough, exudate and callus    Pre Debridement Measurements:  Are located in the Wound Documentation Flow Sheet    All active wounds listed below with today's date are evaluated  Wound(s)    debrided this date include # : 2    Post  Debridement Measurements:  Wound 05/11/22 #1 (onset 2 weeks) Left Medial Foot Amp Site (Active)   Wound Image   10/25/22 1411   Wound Etiology Non-Healing Surgical 10/20/22 1153   Dressing Status New dressing applied;Dry;Clean; Intact 10/25/22 1510   Wound Cleansed Soap and water 10/20/22 1101   Offloading for Diabetic Foot Ulcers Diabetic shoes/inserts 10/25/22 1510   Dressing Change Due 09/16/22 09/24/22 0300   Wound Length (cm) 0 cm 10/25/22 1411   Wound Width (cm) 0 cm 10/25/22 1411   Wound Depth (cm) 0 cm 10/25/22 1411   Wound Surface Area (cm^2) 0 cm^2 10/25/22 1411   Change in Wound Size % (l*w) 100 10/25/22 1411   Wound Volume (cm^3) 0 cm^3 10/25/22 1411   Wound Healing % 100 10/25/22 1411   Post-Procedure Length (cm) 3.5 cm 10/20/22 1141   Post-Procedure Width (cm) 0.5 cm 10/20/22 1141   Post-Procedure Depth (cm) 0.1 cm 10/20/22 1141   Post-Procedure Surface Area (cm^2) 1.75 cm^2 10/20/22 1141   Post-Procedure Volume (cm^3) 0.175 cm^3 10/20/22 1141   Distance Tunneling (cm) 0 cm 10/25/22 1411   Tunneling Position ___ O'Clock 0 10/25/22 1411   Undermining Starts ___ O'Clock 0 10/25/22 1411   Undermining Ends___ O'Clock 0 10/25/22 1411   Undermining Maxium Distance (cm) 0 10/25/22 1411   Wound Assessment Dry;Devitalized tissue 10/25/22 1420   Drainage Amount None 10/25/22 1420   Drainage Description Serosanguinous; Yellow 10/20/22 1101   Odor None 10/20/22 1101   Rosalie-wound Assessment Hyperkeratosis (callous); Maceration 10/20/22 1101   Margins Defined edges 10/20/22 1101   Wound Thickness Description not for Pressure Injury Full thickness 10/20/22 1101   Number of days: 167       Wound 05/11/22 #2 (onset unknown) Left Plantar (Active)   Wound Image   10/25/22 1411   Wound Etiology Diabetic 10/20/22 1153   Dressing Status New dressing applied;Clean;Dry; Intact 10/25/22 1510   Wound Cleansed Wound cleanser; Soap and water 10/25/22 1411   Offloading for Diabetic Foot Ulcers Diabetic shoes/inserts 10/25/22 1510   Dressing Change Due 09/04/22 09/24/22 0300   Wound Length (cm) 1.3 cm 10/25/22 1411   Wound Width (cm) 1 cm 10/25/22 1411   Wound Depth (cm) 0.2 cm 10/25/22 1411   Wound Surface Area (cm^2) 1.3 cm^2 10/25/22 1411   Change in Wound Size % (l*w) 18.75 10/25/22 1411   Wound Volume (cm^3) 0.26 cm^3 10/25/22 1411   Wound Healing % 19 10/25/22 1411   Post-Procedure Length (cm) 1.3 cm 10/25/22 1420   Post-Procedure Width (cm) 1 cm 10/25/22 1420   Post-Procedure Depth (cm) 0.2 cm 10/25/22 1420   Post-Procedure Surface Area (cm^2) 1.3 cm^2 10/25/22 1420   Post-Procedure Volume (cm^3) 0.26 cm^3 10/25/22 1420   Distance Tunneling (cm) 0 cm 10/25/22 1411   Tunneling Position ___ O'Clock 0 10/25/22 1411   Undermining Starts ___ O'Clock 00 10/25/22 1411   Undermining Ends___ O'Clock 0 10/25/22 1411   Undermining Maxium Distance (cm) 0 10/25/22 1411   Wound Assessment Pink/red 10/25/22 1411   Drainage Amount Moderate 10/25/22 1411   Drainage Description Serosanguinous 10/25/22 1411   Odor None 10/25/22 1411   Rosalie-wound Assessment Hyperkeratosis (callous) 10/25/22 1411   Margins Defined edges 10/25/22 1411   Wound Thickness Description not for Pressure Injury Full thickness 10/25/22 1411   Number of days: 167     Percent of Wound(s) Debrided: approximately 100%    Total  Area  Debrided: 1.3 sq cm     Bleeding:  Minimal    Hemostasis Achieved:  not needed    Procedural Pain:  0  / 10     Post Procedural Pain:  0 / 10     Response to treatment:  Well tolerated by patient. Procedure Note    Indications:  Based on my examination of this patient's wound(s) today, sharp excision into necrotic bone is required to promote healing and evaluate the extent of previous healing.     Performed by: RAFAEL Aburto CNP    Consent obtained: Yes    Time out taken:  Yes    Pain Control: Anesthetic  Anesthetic: 4% Lidocaine Liquid Topical        Debridement:Excisional Debridement    Using curette, #15 blade scalpel, and forceps the wound(s) was/were sharply debrided down through and including the removal of bone. Devitalized Tissue Debrided:  fibrin, biofilm, slough, necrotic/eschar, and exudate    Pre Debridement Measurements:  Are located in the Wound Documentation Flow Sheet    All active wounds listed below with today's date are evaluated  Wound(s)    debrided this date include # : 3     Post  Debridement Measurements:  Negative Pressure Wound Therapy Leg Anterior; Lower;Proximal;Right (Active)   Unit Type KCI 10/25/22 1510   Dressing Type Black Foam 10/25/22 1510   Number of pieces used 1 10/25/22 1510   Cycle Continuous 10/25/22 1510   Target Pressure (mmHg) 125 10/25/22 1510   Intensity 5 10/25/22 1510   Canister changed? Yes 10/25/22 1510   Dressing Status New dressing applied 10/25/22 1510   Dressing Changed Changed/New 10/25/22 1510   Drainage Amount Moderate 10/25/22 1510   Number of days: 1   Wound 10/20/22 #3 right amp site. (Active)   Wound Image   10/25/22 1411   Wound Etiology Non-Healing Surgical 10/20/22 1153   Dressing Status New dressing applied;Clean;Dry; Intact 10/25/22 1510   Wound Cleansed Wound cleanser 10/25/22 1411   Offloading for Diabetic Foot Ulcers Offloading not required 10/25/22 1510   Wound Length (cm) 1.3 cm 10/25/22 1411   Wound Width (cm) 3.5 cm 10/25/22 1411   Wound Depth (cm) 0.8 cm 10/25/22 1411   Wound Surface Area (cm^2) 4.55 cm^2 10/25/22 1411   Change in Wound Size % (l*w) 18.75 10/25/22 1411   Wound Volume (cm^3) 3.64 cm^3 10/25/22 1411   Wound Healing % -63 10/25/22 1411   Post-Procedure Length (cm) 1.3 cm 10/25/22 1420   Post-Procedure Width (cm) 3.5 cm 10/25/22 1420   Post-Procedure Depth (cm) 0.8 cm 10/25/22 1420   Post-Procedure Surface Area (cm^2) 4.55 cm^2 10/25/22 1420   Post-Procedure Volume (cm^3) 3.64 cm^3 10/25/22 1420   Distance Tunneling (cm) 0 cm 10/25/22 1411   Tunneling Position ___ O'Clock 0 10/25/22 1411   Undermining Starts ___ O'Clock 0 10/25/22 1411   Undermining Ends___ O'Clock 0 10/25/22 1411   Undermining Maxium Distance (cm) 0 10/25/22 1411   Wound Assessment Granulation tissue;Slough;Pink/red; Exposed structure bone 10/25/22 1411   Drainage Amount Moderate 10/25/22 1411   Drainage Description Yellow;Serosanguinous 10/25/22 1411   Odor None 10/25/22 1411   Rosalie-wound Assessment Intact;Fragile 10/25/22 1411   Margins Attached edges 10/25/22 1411   Wound Thickness Description not for Pressure Injury Full thickness 10/25/22 1411   Number of days: 6     Percent of Wound(s) Debrided: approximately 100%    Total  Area  Debrided: 4.55 sq cm     Bleeding:  Minimal    Hemostasis Achieved:  by pressure    Procedural Pain:  0  / 10     Post Procedural Pain:  0 / 10     Response to treatment:  Well tolerated by patient. Status of wound progress and description from last visit: The wounds on the left foot are clean with no S&S infection. Regimen as below. Right BKA post necrotizing fascitis, the BKA site now with dehiscence, able to probe to bone. NPWT  started today, he is on Bactrim. The patient continues to orellana depression post his mother's death. Advanced Modality Checklist: Skin substitutes    [x] Yes []  No  Is the wound Greater than 1.0 sq cm  [x] Yes []  No  Has the wound had Documented Treatment for 30 days ? [] Yes [x]  No  Recurrent Wound with Skin Substitute with Last Year?  [] Yes [x]  No  Radiographic testing in the last 3 months? [] Yes [x]  No  Vascular Assessment in the last 6 months? [x] Yes []  No  Smoking Status  [x] Yes []  No  Nutrition Assessed  [x] Yes []  No  Wound Free from infection   [x] Yes []  No  Is wound free of eschar, slough, and/or Bio Portal?   [] Yes [x]  No  Malignant Process in Wound  [] Yes []  No  Hemoglobin A1C in the last 3 months?  last A1c 10.4 as of 4/26/22  [x] Yes []  No  Off loading Diabetic Foot Ulcer? [x] Yes []  No Compression Therapy of 20 mmHg or Greater? Plan:       Discharge Instructions         PHYSICIAN ORDERS AND DISCHARGE INSTRUCTIONS     NOTE: Upon discharge from the [address], you will receive a patient experience survey. We would be grateful if you would take the time to fill this survey out. Wound cleansing:              Do not scrub or use excessive force. Wash hands with soap and water before and after dressing changes. Prior to applying a clean dressing, cleanse wound with normal saline, wound cleanser, or mild soap and water. Ask the physician or nurse before getting the wound(s) wet in a shower     Daily Wound management:              Keep weight off wounds and reposition every 2 hours. Avoid standing for long periods of time. Apply wraps/stockings in AM and remove at bedtime. If swelling is present, elevate legs to the level of the heart or above for 30 minutes 4-5 times a day and/or when sitting. When taking antibiotics take entire prescription as ordered by physician do not stop taking until medicine is all gone. Wound Care Notes:  Rx: Nunu Atkins  Apply for grafts 05/11/22: aLL GRAFTS approved 06/08/22 for Left Toe amp site  Reapply for graft 08/22/22 for Left toes amp site   YADY's   Right 1.07      Left    1.1           Date: 08/15/22           Grafts Left Foot Amp Site  Puraply #1 Graft #1 4x4 fenestrated 06/15/22   Puraply #2 Graft #2 4x4 fenestrated 06/22/22                                    Orders for this week: 10/20/22     FAX ORDERS TO [facility] [phone]     Left Great toe amp site - Apply A&D and Stimulen to wound bed. Left Plantar Foot wound -- Clean with soap and water, pat dry. Apply anasept gel, stimulen powder and ioplex to wound beds.   Cover with Sorbex. Wrap with Coban 2 Lite. Leave in place for 1 week. WOUND VAC THERAPY: Right Leg Amp Site     DUODERM TO PERIWOUND FOR PROTECTION. APPLY SORBACT OVER BONE, STIMULEN TO WOUND BED AND BLACK FOAM TO WOUND BED. SECURE VAC DRESSING WITH DRAPE. SET WOUND VAC  CONTINUOUS SUCTION. CANISTER CHANGE WEEKLY OR ACCORDING TO VOLUME OF DRAINAGE. WOUND VAC DRESSING TO BE CHANGED BY HOME CARE ON Tuesdays & Saturdays or Sundays. WOUND CARE CENTER WILL CHANGE ON FRIDAYS (IF VAC SUPPLIES SENT - NEED CANISTER AND BLACK FOAM DRESSING KIT). Follow Up Instructions: At the [address] [place] [address] in 1 week: Tuesday   Primary Wound Care Provider: Laxmi Claudio CNP   Call  for any questions or concerns. Central Scheduling: [phone]      Treatment Note Wound 10/20/22 #3 right amp site. -Dressing/Treatment:  (duoderm sorbact stimulen black foam)  Wound 05/11/22 #1 (onset 2 weeks) Left Medial Foot Amp Site-Dressing/Treatment:  (A&D stimulen)  Wound 05/11/22 #2 (onset unknown) Left Plantar-Dressing/Treatment:  (Anasept gel, Stimulen, Ioples, Sorbex, Coban 2 lite)    Written Patient Dismissal Instructions Given            Electronically signed by RAFAEL Delgado CNP on 10/25/2022 at 3:47 PM

## 2022-10-25 NOTE — DISCHARGE INSTRUCTIONS
PHYSICIAN ORDERS AND DISCHARGE INSTRUCTIONS     NOTE: Upon discharge from the [address], you will receive a patient experience survey. We would be grateful if you would take the time to fill this survey out. Wound cleansing:              Do not scrub or use excessive force. Wash hands with soap and water before and after dressing changes. Prior to applying a clean dressing, cleanse wound with normal saline, wound cleanser, or mild soap and water. Ask the physician or nurse before getting the wound(s) wet in a shower     Daily Wound management:              Keep weight off wounds and reposition every 2 hours. Avoid standing for long periods of time. Apply wraps/stockings in AM and remove at bedtime. If swelling is present, elevate legs to the level of the heart or above for 30 minutes 4-5 times a day and/or when sitting. When taking antibiotics take entire prescription as ordered by physician do not stop taking until medicine is all gone. Wound Care Notes:  Rx: Prisca Butler  Apply for grafts 05/11/22: aLL GRAFTS approved 06/08/22 for Left Toe amp site  Reapply for graft 08/22/22 for Left toes amp site   YADY's   Right 1.07      Left    1.1           Date: 08/15/22           Grafts Left Foot Amp Site  Puraply #1 Graft #1 4x4 fenestrated 06/15/22   Puraply #2 Graft #2 4x4 fenestrated 06/22/22                                    Orders for this week: 10/20/22     [facility]X ORDERS TO [facility] [phone]     Left Great toe amp site - Apply A&D and Stimulen to wound bed. Left Plantar Foot wound -- Clean with soap and water, pat dry. Apply anasept gel, stimulen powder and ioplex to wound beds. Cover with Sorbex. Wrap with Coban 2 Lite. Leave in place for 1 week. WOUND VAC THERAPY: Right Leg Amp Site     DUODERM TO PERIWOUND FOR PROTECTION.  APPLY SORBACT OVER BONE, STIMULEN TO WOUND BED AND BLACK FOAM TO WOUND BED. SECURE VAC DRESSING WITH DRAPE. SET WOUND VAC  CONTINUOUS SUCTION. CANISTER CHANGE WEEKLY OR ACCORDING TO VOLUME OF DRAINAGE. WOUND VAC DRESSING TO BE CHANGED BY HOME CARE ON FRIDAY  WOUND CARE CENTER WILL CHANGE ON  (IF VAC SUPPLIES SENT - NEED CANISTER AND BLACK FOAM DRESSING KIT). Follow Up Instructions: At the [address] [place] [address] Road in 1 week: Tuesday   Primary Wound Care Provider: Lani Torres CNP   Call  for any questions or concerns.   Central [phone]

## 2022-10-25 NOTE — PROGRESS NOTES
Multilayer Compression Wrap   (Not Unna) Below the Knee    NAME:  Sierra Sher OF BIRTH:  1980  MEDICAL RECORD NUMBER:  [id number]  DATE:  10/25/2022    Multilayer compression wrap: Removed old Multilayer wrap if indicated and wash leg with mild soap/water. Applied moisturizing agent to dry skin as needed. Applied primary and secondary dressing as ordered. Applied multilayered dressing below the knee to left lower leg. Instructed patient/caregiver not to remove dressing and to keep it clean and dry. Instructed patient/caregiver on complications to report to provider, such as pain, numbness in toes, heavy drainage, and slippage of dressing. Instructed patient on purpose of compression dressing and on activity and exercise recommendations. Negative Pressure Wound Therapy    NAME:  Sierra Sher OF BIRTH:  1980  MEDICAL RECORD NUMBER:  [id number]  DATE:  10/25/2022    Applied Negative Pressure to right amp site. [x] Applied skin barrier prep to serenity-wound. [x] Cut strips of plastic drape to picture frame wound so that serenity-wound is     covered with the drape. [x] If bridging dressing to less prominent site, cover any intact skin that will come in contact with the Negative Pressure Therapy sponge, gauze or channel drain with plastic drape. The sponge should never touch intact skin. [x] Cut sponge, gauze or channel drain to size which will fit into the wound/ulcer bed without being forced. [x] Be sure the sponge is large enough to hold the entire round plastic flange which is attached to the tubing. Never allow flange to be larger than the sponge or it will produce suction damaging intact skin. Total number of individual pieces of foam used within the wound bed: 1    [x] If bridging the dressing away from the primary site, be sure the bridge leads to a piece of sponge large enough to hold the entire flange without allowing any of the flange to overlap onto intact skin. [x] Covered sponge, gauze or channel drain with plastic drape. [x] Cut a hole in this plastic drape directly over the sponge the same size as the plastic drain tubing. [x] Removed plastic liner from flange and apply it directly over the hole you cut. [x] Removed the plastic cover from the flange. [x] Attached the tubing to the wound/ulcer Negative Pressure Therapy and turn it on to be sure a vacuum is created and that there are no leaks. [x] If air leaks occur, use plastic drape to patch them. [x] Secured Negative Pressure Therapy dressing with ace wrap loosely if located on an extremity. Maintain tubing outside of ace wrap. Tubing must not exert pressure on intact skin.     Applied per  Guidelines      Electronically signed by Jordi Lazar LPN on 91/60/9666 at 3:19 PM   Electronically signed by Jordi Lazar LPN on 90/65/6410 at 3:19 PM

## 2022-10-25 NOTE — CARE COORDINATION
Phone call to Primitivo Nolen who reported his home health nurse, Jyoti Smith was also present. Primitivo Noeln reported he has not heard any updates from [facility] regarding the AL waiver. SW discussed plans to call. Attempted phone call to [facility] to obtain update on status of AL waiver. Yvan Chand was not available, voicemail message left requesting return phone call to discuss status for AL waiver. Received return phone call from [facility] with AAA stating she is the person who sends referrals to [address] [place] [address], stating she sent a referral to [facility] only. Phone call to Pt who reported he would like his referral sent to [address] [place] [address] as well. [facility] was notified and stated she would send referral to [address] [place] [address] as well and would notify this SW of any updates. KOLE plan of care:  KOLE will follow up with Pt and AAA in one week (11/1) for update on status of referrals to [address] [place] [address].

## 2022-10-26 RX ORDER — SULFAMETHOXAZOLE AND TRIMETHOPRIM 800; 160 MG/1; MG/1
1 TABLET ORAL 2 TIMES DAILY
Qty: 28 TABLET | Refills: 0 | Status: SHIPPED | OUTPATIENT
Start: 2022-10-26 | End: 2022-11-15 | Stop reason: SDUPTHER

## 2022-11-01 ENCOUNTER — HOSPITAL ENCOUNTER (OUTPATIENT)
Dept: WOUND CARE | Age: 42
Discharge: HOME | End: 2022-11-01
Payer: MEDICARE

## 2022-11-01 ENCOUNTER — CARE COORDINATION (OUTPATIENT)
Dept: CARE COORDINATION | Age: 42
End: 2022-11-01

## 2022-11-01 VITALS
TEMPERATURE: 98.5 F | HEART RATE: 89 BPM | RESPIRATION RATE: 16 BRPM | SYSTOLIC BLOOD PRESSURE: 100 MMHG | DIASTOLIC BLOOD PRESSURE: 67 MMHG

## 2022-11-01 DIAGNOSIS — E11.621 DIABETIC ULCER OF TOE OF LEFT FOOT ASSOCIATED WITH TYPE 2 DIABETES MELLITUS, WITH FAT LAYER EXPOSED (HCC): ICD-10-CM

## 2022-11-01 DIAGNOSIS — E11.621 DIABETIC ULCER OF LEFT MIDFOOT ASSOCIATED WITH TYPE 2 DIABETES MELLITUS, WITH FAT LAYER EXPOSED (HCC): Primary | ICD-10-CM

## 2022-11-01 DIAGNOSIS — L97.522 DIABETIC ULCER OF TOE OF LEFT FOOT ASSOCIATED WITH TYPE 2 DIABETES MELLITUS, WITH FAT LAYER EXPOSED (HCC): ICD-10-CM

## 2022-11-01 DIAGNOSIS — Z89.432 PARTIAL NONTRAUMATIC AMPUTATION OF FOOT, LEFT (HCC): ICD-10-CM

## 2022-11-01 DIAGNOSIS — L97.422 DIABETIC ULCER OF LEFT MIDFOOT ASSOCIATED WITH TYPE 2 DIABETES MELLITUS, WITH FAT LAYER EXPOSED (HCC): Primary | ICD-10-CM

## 2022-11-01 DIAGNOSIS — L03.116 CELLULITIS OF LEFT FOOT: ICD-10-CM

## 2022-11-01 PROCEDURE — 11042 DBRDMT SUBQ TIS 1ST 20SQCM/<: CPT | Performed by: NURSE PRACTITIONER

## 2022-11-01 PROCEDURE — 11044 DBRDMT BONE 1ST 20 SQ CM/<: CPT

## 2022-11-01 PROCEDURE — 97605 NEG PRS WND THER DME<=50SQCM: CPT

## 2022-11-01 PROCEDURE — 11044 DBRDMT BONE 1ST 20 SQ CM/<: CPT | Performed by: NURSE PRACTITIONER

## 2022-11-01 PROCEDURE — 11042 DBRDMT SUBQ TIS 1ST 20SQCM/<: CPT

## 2022-11-01 RX ORDER — LIDOCAINE HYDROCHLORIDE 20 MG/ML
JELLY TOPICAL ONCE
Status: CANCELLED | OUTPATIENT
Start: 2022-11-01 | End: 2022-11-01

## 2022-11-01 RX ORDER — LIDOCAINE HYDROCHLORIDE 40 MG/ML
SOLUTION TOPICAL ONCE
Status: CANCELLED | OUTPATIENT
Start: 2022-11-01 | End: 2022-11-01

## 2022-11-01 RX ORDER — GINSENG 100 MG
CAPSULE ORAL ONCE
Status: CANCELLED | OUTPATIENT
Start: 2022-11-01 | End: 2022-11-01

## 2022-11-01 RX ORDER — LIDOCAINE 40 MG/G
CREAM TOPICAL ONCE
Status: CANCELLED | OUTPATIENT
Start: 2022-11-01 | End: 2022-11-01

## 2022-11-01 RX ORDER — CLOBETASOL PROPIONATE 0.5 MG/G
OINTMENT TOPICAL ONCE
Status: CANCELLED | OUTPATIENT
Start: 2022-11-01 | End: 2022-11-01

## 2022-11-01 RX ORDER — BETAMETHASONE DIPROPIONATE 0.05 %
OINTMENT (GRAM) TOPICAL ONCE
Status: CANCELLED | OUTPATIENT
Start: 2022-11-01 | End: 2022-11-01

## 2022-11-01 RX ORDER — GENTAMICIN SULFATE 1 MG/G
OINTMENT TOPICAL ONCE
Status: CANCELLED | OUTPATIENT
Start: 2022-11-01 | End: 2022-11-01

## 2022-11-01 RX ORDER — BACITRACIN, NEOMYCIN, POLYMYXIN B 400; 3.5; 5 [USP'U]/G; MG/G; [USP'U]/G
OINTMENT TOPICAL ONCE
Status: CANCELLED | OUTPATIENT
Start: 2022-11-01 | End: 2022-11-01

## 2022-11-01 RX ORDER — BACITRACIN ZINC AND POLYMYXIN B SULFATE 500; 1000 [USP'U]/G; [USP'U]/G
OINTMENT TOPICAL ONCE
Status: CANCELLED | OUTPATIENT
Start: 2022-11-01 | End: 2022-11-01

## 2022-11-01 RX ORDER — LIDOCAINE 50 MG/G
OINTMENT TOPICAL ONCE
Status: CANCELLED | OUTPATIENT
Start: 2022-11-01 | End: 2022-11-01

## 2022-11-01 ASSESSMENT — PAIN SCALES - WONG BAKER: WONGBAKER_NUMERICALRESPONSE: 4

## 2022-11-01 NOTE — CARE COORDINATION
Phone call to Pt. Discussed with Pt that [facility] requested medical information for admission. Pt reported he has not heard anything. Discussed plans to contact the , Royce Mcburney for [facility]. Pt was in agreement. Attempted call to [facility] , Royce Mcburney. No answer, not able to leave vm message. KOLE plan of care:  SW will attempt to reach [facility], Royce Mcburney to obtain information on admission process / wait list for admission on 11/3.

## 2022-11-01 NOTE — WOUND CARE
WOUND VAC THERAPY:     DUODERM TO PERIWOUND FOR PROTECTION. APPLY BLACK FOAM TO RIGHT LOWER LEG AMP SITE  MAY USE MEPITEL TO WOUND BED TO PREVENT BLACK FOAM FROM ADHERING TO WOUND BED. SECURE VAC. DRESSING WITH DRAPE. SET WOUND VAC  CONTINUOUS SUCTION. CANISTER CHANGE WITH EACH DRESSING CHANGE OR ACCORDING TO VOLUME OF DRAINAGE.     WOUND VAC DRESSING TO BE CHANGED MON, WED, FRI

## 2022-11-01 NOTE — PROGRESS NOTES
Wound Care Center Progress Note With Procedure    Deborah Irving  AGE: 43 y.o. GENDER: male  : 1980  EPISODE DATE:  2022     Subjective:     Chief Complaint   Patient presents with    Wound Check     Right lower leg amp site          HISTORY of PRESENT ILLNESS      Deborah Irving is a 43 y.o. male who presents today for wound evaluation of Chronic diabetic, pressure and non-healing surgical ulcer(s) of the left medial foot and lateral plantar surface. The ulcer is of marked severity. The underlying cause of the wound is diabetes, non-healing surgical. He presents today with a new wound to the right plantar foot that is diabetic and pressure in nature and of moderate severity. The patient has significant underlying medical conditions as below. The patient is having significant depression related to the recent and sudden death of his mother. 10/20/22: The patient was here last 10/4/22 and his right BKA site was well approximated, staples removed and steri strips. The prosthetic supplier was with him and plans had started to get fitted. Unfortunately, the patient fell the same night at home and the wound dehisced. He was evaluated at the ED. The patient then was not seen at the wound clinic for a few weeks as he was hospitalized for suicidal ideation. Today able to probe and visibly see bone. Home Health still in place, will apply for a wound vac. Will also start Bactrim. 22: Since his last visit to the wound clinic 22 the patient was hospitalized -22) at [facility] with necrotizing fascitis right lower leg with resulting right BKA. 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 Vish Pathak. She placed a wound VAC to on the stump at that time. On 2022 Dr. Soheila Cárdenas performed a primary closure of his right BKA stump.     Wound Pain Timing/Severity: none  Quality of pain: N/A  Severity of pain:  0 / 10   Modifying Factors: diabetes and chronic pressure  Associated Signs/Symptoms: drainage     Diabetes: Yes, on an oral and insulin regimen, last A1c 10.5 as of 7/28/22  Diabetes education provided today:    Diabetes pathoetiology, difference between type 1 and type 2 diabetes, and progressive nature of Type 2 DM. Diabetic Neuropathy: signs and therapy. Foot care: advised to wash feet daily, pat dry and apply lotion at night, avoiding between toes. Need to look at feet daily and report to a physician any signs of inflammation or skin damage. Discussed diabetes shoes and socks. Diabetic management related to wound care    Smoking: Never smoker  Obesity:No  Anticoagulant therapy: No  Immunosuppression: No    Patient educated on the 6 essential components necessary for wound healing: Circulation, Debridements, Proper Dressings and Topical Wound Products, Infection Control, Edema Control and Offloading. Patient educated on those factors that negatively effect or impact wound healing: smoking, obesity, uncontrolled diabetes, anticoagulant and immunosuppressive regimens, inadequate nutrition, untreated arterial and venous disease if applicable and measures to manage edema. Nutritional status: well nourished. Discussed need for increased protein and calories for wound healing and good sources of protein (just over 7 grams for every 20 pounds of body weight). Animal-based foods high in protein (meat, poultry, fish, eggs, and dairy foods). Plant based foods high in protein (tofu, lentils, beans, chickpeas, nuts, quinoa and den seeds. Off Loading  Offloading or minimizing or removing weight placed on an area with poor circulation such as diabetic wounds or pressure.  This can be achieved with crutches, wheel chair, knee walker etc. Minimizing pressure through partial weight bearing (minimizing the amount of  pressure applied and or the amount of time on the area of pressure) or maintaining a non-weight bearing status can be used to promote and often can be essential for thee wound to heal. Off loading may also need to be achieved for non-weight bearing wounds such as pressure ulcers to the torso. Turning and changing positions frequently, at least every two hours. Use of pressure cushion if sitting up in chair. Skin Care  Keep skin clean and well moisturized , moisturize routinely with ointments for heavier moisturizer needs for extremely dry skin or cracks such as A&D ointment and lotions for a light moisturizer such as CeraVe or Eucerin. If incontinent change incontinence garments as soon as soiled and keeping skin clean and use barrier cream to protect the skin.         PAST MEDICAL HISTORY        Diagnosis Date    Abscess of left foot 4/22/2022    Anemia associated with acute blood loss 4/26/2022    Callus of foot     Right foot - see's Dr. Kath Mane    Cellulitis of left foot 4/22/2022    Diabetic ulcer of left midfoot associated with type 2 diabetes mellitus, with muscle involvement without evidence of necrosis ([address] 4/26/2022    Diabetic ulcer of left midfoot associated with type 2 diabetes mellitus, with necrosis of muscle ([address] 6/7/2021    Diabetic ulcer of right midfoot associated with type 2 diabetes mellitus, with fat layer exposed ([address] 8/11/2020    Dizziness     positional    Essential hypertension     Follows with PCP    Hyperlipidemia     Lumbar radiculopathy     Septic embolism ([address] 6/13/2020    Subacute osteomyelitis of left foot ([address] 4/22/2022       PAST SURGICAL HISTORY    Past Surgical History:   Procedure Laterality Date    ACHILLES TENDON SURGERY Right 1/8/2021    RIGHT ACHILLES TENDON LENGTHENING REPAIR performed by Juan Raphael DPM at [address]  03/2018    [facility]    DENTAL SURGERY      teeth extractions -half per patient    HERNIA REPAIR Bilateral 5/27/2020    BIALTERAL HERNIA INGUINAL REPAIR performed by Keeley Solorio MD at [address] Right 9/2/2022    LEG [DISCONTINUED] metFORMIN (GLUCOPHAGE) 500 MG tablet Take 2 tablets by mouth 2 times daily (with meals) Indications: Start tomorrow 03/14 360 tablet 1    [DISCONTINUED] metoclopramide (REGLAN) 10 MG tablet Take 1 tablet by mouth 4 times daily (before meals and nightly) 120 tablet 3    sucralfate (CARAFATE) 1 GM/10ML suspension Take 10 mLs by mouth 4 times daily for 7 days 414 mL 0    glyBURIDE (DIABETA) 5 MG tablet Take 2 tablets by mouth in the morning and at bedtime 120 tablet 1    Dulaglutide 1.5 MG/0.5ML SOPN Inject 1.5 mg into the skin once a week 5 pen 3    [DISCONTINUED] omeprazole (PRILOSEC) 20 MG delayed release capsule Take 1 capsule by mouth once daily in the morning 90 capsule 1    FLUoxetine (PROZAC) 40 MG capsule Take 2 capsules by mouth in the morning. 30 capsule 3    insulin glargine (LANTUS SOLOSTAR) 100 UNIT/ML injection pen Inject 30 Units into the skin nightly 5 pen 2    Alcohol Swabs PADS       atorvastatin (LIPITOR) 40 MG tablet Take 1 tablet by mouth nightly 90 tablet 1    blood glucose monitor strips Test 2 times a day & as needed for symptoms of irregular blood glucose. Dispense sufficient amount for indicated testing frequency plus additional to accommodate PRN testing needs. 100 strip 0    [DISCONTINUED] pioglitazone (ACTOS) 30 MG tablet Take 1 tablet by mouth daily 90 tablet 1    blood glucose monitor kit and supplies Dispense sufficient amount for indicated testing frequency plus additional to accommodate PRN testing needs. Dispense all needed supplies to include: monitor, strips, lancing device, lancets, control solutions, alcohol swabs. 1 kit 0    FreeStyle Lancets MISC 1 each by Does not apply route daily 100 each 3    [DISCONTINUED] acetaminophen (TYLENOL) 325 MG tablet Take 2 tablets by mouth every 4 hours as needed for Pain or Fever 120 tablet 3     No current facility-administered medications on file prior to encounter.        REVIEW OF SYSTEMS    Pertinent items are noted in HPI.    Constitutional: Negative for systemic symptoms including fever, chills and malaise. Objective:      /67   Pulse 89   Temp 98.5 °F (36.9 °C) (Temporal)   Resp 16     PHYSICAL EXAM      General: The patient is in no acute distress. Mental status:  Patient is appropriate, is  oriented to place and plan of care. Dermatologic exam: Visual inspection of the periwound reveals the skin to be normal in turgor and texture  Wound exam: see wound description below in procedure note      Assessment:     Problem List Items Addressed This Visit          Endocrine    WD-Diabetic ulcer of left midfoot associated with type 2 diabetes mellitus, with fat layer exposed ([address] - Primary    Relevant Orders    Initiate Outpatient Wound Care Protocol    Neg. Pressure Wound Therapy    Diabetic ulcer of toe of left foot associated with type 2 diabetes mellitus, with fat layer exposed ([address]    Relevant Orders    Initiate Outpatient Wound Care Protocol    Neg. Pressure Wound Therapy       Other    Cellulitis of left foot    Relevant Orders    Initiate Outpatient Wound Care Protocol    Neg. Pressure Wound Therapy    WD-Partial nontraumatic amputation of foot, left ([address]    Relevant Orders    Initiate Outpatient Wound Care Protocol    Neg. Pressure Wound Therapy       Procedure Note    Indications:  Based on my examination of this patient's wound(s) today, sharp excision into necrotic subcutaneous tissue is required to promote healing and evaluate the extent of previous healing. Performed by: RAFAEL Lee CNP    Consent obtained: Yes    Time out taken:  Yes    Pain Control: Anesthetic  Anesthetic: 4% Lidocaine Liquid Topical      Debridement:Excisional Debridement    Using curette and tissue nippers the wound(s) was/were sharply debrided down through and including the removal of subcutaneous tissue.         Devitalized Tissue Debrided:  slough, exudate and callus    Pre Debridement Measurements:  Are located in the Wound Documentation Flow Sheet    All active wounds listed below with today's date are evaluated  Wound(s)    debrided this date include # : 2    Post  Debridement Measurements:  Wound 05/11/22 #2 (onset unknown) Left Plantar (Active)   Wound Image   10/25/22 1411   Wound Etiology Diabetic 10/20/22 1153   Dressing Status New dressing applied;Clean;Dry; Intact 10/25/22 1510   Wound Cleansed Wound cleanser 11/01/22 1408   Offloading for Diabetic Foot Ulcers Diabetic shoes/inserts 11/01/22 1408   Wound Length (cm) 0.9 cm 11/01/22 1408   Wound Width (cm) 0.6 cm 11/01/22 1408   Wound Depth (cm) 0.3 cm 11/01/22 1408   Wound Surface Area (cm^2) 0.54 cm^2 11/01/22 1408   Change in Wound Size % (l*w) 66.25 11/01/22 1408   Wound Volume (cm^3) 0.162 cm^3 11/01/22 1408   Wound Healing % 49 11/01/22 1408   Post-Procedure Length (cm) 0.9 cm 11/01/22 1412   Post-Procedure Width (cm) 0.6 cm 11/01/22 1412   Post-Procedure Depth (cm) 0.3 cm 11/01/22 1412   Post-Procedure Surface Area (cm^2) 0.54 cm^2 11/01/22 1412   Post-Procedure Volume (cm^3) 0.162 cm^3 11/01/22 1412   Distance Tunneling (cm) 0 cm 11/01/22 1408   Tunneling Position ___ O'Clock 0 11/01/22 1408   Undermining Starts ___ O'Clock 1200 11/01/22 1408   Undermining Ends___ O'Clock 1200 11/01/22 1408   Undermining Maxium Distance (cm) 0.3 11/01/22 1408   Wound Assessment Pink/red 11/01/22 1408   Drainage Amount Moderate 11/01/22 1408   Drainage Description Serosanguinous 11/01/22 1408   Odor None 11/01/22 1408   Rosalie-wound Assessment Hyperkeratosis (callous) 11/01/22 1408   Margins Defined edges 11/01/22 1408   Wound Thickness Description not for Pressure Injury Full thickness 11/01/22 1408   Number of days: 174     Percent of Wound(s) Debrided: approximately 100%    Total  Area  Debrided: 0.54 sq cm     Bleeding:  Minimal    Hemostasis Achieved:  not needed    Procedural Pain:  0  / 10     Post Procedural Pain:  0 / 10     Response to treatment:  Well tolerated by patient. Procedure Note    Indications:  Based on my examination of this patient's wound(s) today, sharp excision into necrotic bone is required to promote healing and evaluate the extent of previous healing. Performed by: RAFAEL Singh CNP    Consent obtained: Yes    Time out taken:  Yes    Pain Control: Anesthetic  Anesthetic: 4% Lidocaine Liquid Topical        Debridement:Excisional Debridement    Using curette, #15 blade scalpel, and forceps the wound(s) was/were sharply debrided down through and including the removal of bone. Devitalized Tissue Debrided:  fibrin, biofilm, slough, necrotic/eschar, and exudate    Pre Debridement Measurements:  Are located in the Wound Documentation Flow Sheet    All active wounds listed below with today's date are evaluated  Wound(s)    debrided this date include # : 3     Post  Debridement Measurements:  Negative Pressure Wound Therapy Leg Anterior; Lower;Proximal;Right (Active)   Unit Type KCI 10/25/22 1510   Dressing Type Black Foam 10/25/22 1510   Number of pieces used 1 10/25/22 1510   Cycle Continuous 10/25/22 1510   Target Pressure (mmHg) 125 10/25/22 1510   Intensity 5 10/25/22 1510   Canister changed? Yes 10/25/22 1510   Dressing Status New dressing applied 10/25/22 1510   Dressing Changed Changed/New 10/25/22 1510   Drainage Amount Moderate 10/25/22 1510   Number of days: 6   Wound 10/20/22 #3 right amp site. (Active)   Wound Image   10/25/22 1411   Wound Etiology Non-Healing Surgical 10/20/22 1153   Dressing Status New dressing applied;Clean;Dry; Intact 10/25/22 1510   Wound Cleansed Wound cleanser 11/01/22 1408   Offloading for Diabetic Foot Ulcers Offloading not required 11/01/22 1408   Wound Length (cm) 1.7 cm 11/01/22 1408   Wound Width (cm) 3.4 cm 11/01/22 1408   Wound Depth (cm) 0.5 cm 11/01/22 1408   Wound Surface Area (cm^2) 5.78 cm^2 11/01/22 1408   Change in Wound Size % (l*w) -3.21 11/01/22 1408   Wound Volume (cm^3) 2.89 cm^3 11/01/22 1408   Wound Healing % -29 11/01/22 1408   Post-Procedure Length (cm) 1.7 cm 11/01/22 1412   Post-Procedure Width (cm) 3.4 cm 11/01/22 1412   Post-Procedure Depth (cm) 0.5 cm 11/01/22 1412   Post-Procedure Surface Area (cm^2) 5.78 cm^2 11/01/22 1412   Post-Procedure Volume (cm^3) 2.89 cm^3 11/01/22 1412   Distance Tunneling (cm) 0 cm 11/01/22 1408   Tunneling Position ___ O'Clock 0 11/01/22 1408   Undermining Starts ___ O'Clock 1100 11/01/22 1408   Undermining Ends___ O'Clock 1500 11/01/22 1408   Undermining Maxium Distance (cm) 0.2 11/01/22 1408   Wound Assessment Granulation tissue;Pink/red;Slough 11/01/22 1408   Drainage Amount Moderate 11/01/22 1408   Drainage Description Yellow;Serosanguinous 11/01/22 1408   Odor None 11/01/22 1408   Rosalie-wound Assessment Intact;Fragile;Blanchable erythema 11/01/22 1408   Margins Attached edges 11/01/22 1408   Wound Thickness Description not for Pressure Injury Full thickness 11/01/22 1408   Number of days: 12       Percent of Wound(s) Debrided: approximately 100%    Total  Area  Debrided: 2.89 sq cm     Bleeding:  Minimal    Hemostasis Achieved:  by pressure    Procedural Pain:  0  / 10     Post Procedural Pain:  0 / 10     Response to treatment:  Well tolerated by patient. Status of wound progress and description from last visit: The wounds on the left foot are clean with no S&S infection. Regimen as below. Right BKA post necrotizing fascitis, the BKA site now with dehiscence, able to probe to bone. NPWT  in place. He is on Bactrim. The patient continues to orellana depression post his mother's death. Advanced Modality Checklist: Skin substitutes    [x] Yes []  No  Is the wound Greater than 1.0 sq cm  [x] Yes []  No  Has the wound had Documented Treatment for 30 days ? [] Yes [x]  No  Recurrent Wound with Skin Substitute with Last Year?  [] Yes [x]  No  Radiographic testing in the last 3 months? [] Yes [x]  No  Vascular Assessment in the last 6 months?   [x] Yes []  No Smoking Status  [x] Yes []  No  Nutrition Assessed  [x] Yes []  No  Wound Free from infection   [x] Yes []  No  Is wound free of eschar, slough, and/or Bio Montegut? [] Yes [x]  No  Malignant Process in Wound  [] Yes []  No  Hemoglobin A1C in the last 3 months?  last A1c 10.4 as of 4/26/22  [x] Yes []  No  Off loading Diabetic Foot Ulcer? [x] Yes []  No Compression Therapy of 20 mmHg or Greater? Plan:       Discharge Instructions         PHYSICIAN ORDERS AND DISCHARGE INSTRUCTIONS     NOTE: Upon discharge from the [address], you will receive a patient experience survey. We would be grateful if you would take the time to fill this survey out. Wound cleansing:              Do not scrub or use excessive force. Wash hands with soap and water before and after dressing changes. Prior to applying a clean dressing, cleanse wound with normal saline, wound cleanser, or mild soap and water. Ask the physician or nurse before getting the wound(s) wet in a shower     Daily Wound management:              Keep weight off wounds and reposition every 2 hours. Avoid standing for long periods of time. Apply wraps/stockings in AM and remove at bedtime. If swelling is present, elevate legs to the level of the heart or above for 30 minutes 4-5 times a day and/or when sitting. When taking antibiotics take entire prescription as ordered by physician do not stop taking until medicine is all gone.                               Wound Care Notes:  Rx: Nunu Atkins  Apply for grafts 05/11/22: aLL GRAFTS approved 06/08/22 for Left Toe amp site  Reapply for graft 08/22/22 for Left toes amp site   YADY's   Right 1.07      Left    1.1           Date: 08/15/22           Grafts Left Foot Amp Site  Puraply #1 Graft #1 4x4 fenestrated 06/15/22   Puraply #2 Graft #2 4x4 fenestrated 06/22/22 Orders for this week: 22     [facility] [phone]     Left Great toe amp site - Apply A&D and Stimulen to wound bed. Left Plantar Foot wound -- Clean with soap and water, pat dry. Apply anasept gel, stimulen powder and ioplex to wound beds. Cover with Sorbex. Wrap with Coban 2 Lite. Leave in place for 1 week. WOUND VAC THERAPY: Right Leg Amp Site     DUODERM TO PERIWOUND FOR PROTECTION. APPLY SORBACT OVER BONE, STIMULEN TO WOUND BED AND BLACK FOAM TO WOUND BED. SECURE VAC DRESSING WITH DRAPE. SET WOUND VAC  CONTINUOUS SUCTION. CANISTER CHANGE WEEKLY OR ACCORDING TO VOLUME OF DRAINAGE. Wrap with ACE wrap, but do not compress tubing against skin. WOUND VAC DRESSING TO BE CHANGED BY HOME CARE ON FRIDAY  WOUND CARE CENTER WILL CHANGE ON  (IF VAC SUPPLIES SENT - NEED CANISTER AND BLACK FOAM DRESSING KIT). Follow Up Instructions: At the [address] in 1 week: Tuesday   Primary Wound Care Provider: Marimar Bautista CNP   Call  for any questions or concerns.   Central [phone]        Treatment Note      Written Patient Dismissal Instructions Given            Electronically signed by RAFAEL Hernandez CNP on 2022 at 3:03 PM

## 2022-11-01 NOTE — DISCHARGE INSTRUCTIONS
PHYSICIAN ORDERS AND DISCHARGE INSTRUCTIONS     NOTE: Upon discharge from the [address], you will receive a patient experience survey. We would be grateful if you would take the time to fill this survey out. Wound cleansing:              Do not scrub or use excessive force. Wash hands with soap and water before and after dressing changes. Prior to applying a clean dressing, cleanse wound with normal saline, wound cleanser, or mild soap and water. Ask the physician or nurse before getting the wound(s) wet in a shower     Daily Wound management:              Keep weight off wounds and reposition every 2 hours. Avoid standing for long periods of time. Apply wraps/stockings in AM and remove at bedtime. If swelling is present, elevate legs to the level of the heart or above for 30 minutes 4-5 times a day and/or when sitting. When taking antibiotics take entire prescription as ordered by physician do not stop taking until medicine is all gone. Wound Care Notes:  Rx: Leonardo Baxter  Apply for grafts 05/11/22: aLL GRAFTS approved 06/08/22 for Left Toe amp site  Reapply for graft 08/22/22 for Left toes amp site   YADY's   Right 1.07      Left    1.1           Date: 08/15/22           Grafts Left Foot Amp Site  Puraply #1 Graft #1 4x4 fenestrated 06/15/22   Puraply #2 Graft #2 4x4 fenestrated 06/22/22                                    Orders for this week: 11/1/22     [facility] TO [facility] [phone]     Left Great toe amp site - Apply A&D and Stimulen to wound bed. Left Plantar Foot wound -- Clean with soap and water, pat dry. Apply anasept gel, stimulen powder and ioplex to wound beds. Cover with Sorbex. Wrap with Coban 2 Lite. Leave in place for 1 week. WOUND VAC THERAPY: Right Leg Amp Site     DUODERM TO PERIWOUND FOR PROTECTION.  APPLY SORBACT OVER BONE, STIMULEN TO WOUND BED AND BLACK FOAM TO WOUND BED. SECURE VAC DRESSING WITH DRAPE. SET WOUND VAC  CONTINUOUS SUCTION. CANISTER CHANGE WEEKLY OR ACCORDING TO VOLUME OF DRAINAGE. Wrap with ACE wrap, but do not compress tubing against skin. WOUND VAC DRESSING TO BE CHANGED BY HOME CARE ON FRIDAY  WOUND CARE CENTER WILL CHANGE ON  (IF VAC SUPPLIES SENT - NEED CANISTER AND BLACK FOAM DRESSING KIT). Follow Up Instructions: At the [address] in 1 week: Tuesday   Primary Wound Care Provider: Tete Ruiz CNP   Call  for any questions or concerns.   Central [phone]

## 2022-11-01 NOTE — WOUND CARE
Multilayer Compression Wrap   (Not Unna) Below the Knee    NAME:  Cora Velasquez OF BIRTH:  1980  MEDICAL RECORD NUMBER:  [id number]  DATE:  11/1/2022    Multilayer compression wrap: Removed old Multilayer wrap if indicated and wash leg with mild soap/water. Applied moisturizing agent to dry skin as needed. Applied primary and secondary dressing as ordered. Applied multilayered dressing below the knee to left lower leg. Instructed patient/caregiver not to remove dressing and to keep it clean and dry. Instructed patient/caregiver on complications to report to provider, such as pain, numbness in toes, heavy drainage, and slippage of dressing. Instructed patient on purpose of compression dressing and on activity and exercise recommendations.       Electronically signed by Chun Fong LPN on 25/4/5678 at 6:24 PM

## 2022-11-03 ENCOUNTER — CARE COORDINATION (OUTPATIENT)
Dept: CARE COORDINATION | Age: 42
End: 2022-11-03

## 2022-11-03 ENCOUNTER — HOSPITAL ENCOUNTER (EMERGENCY)
Age: 42
Discharge: HOME | End: 2022-11-05
Attending: EMERGENCY MEDICINE
Payer: MEDICARE
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
## 2022-11-03 DIAGNOSIS — R45.851 DEPRESSION WITH SUICIDAL IDEATION: Primary | ICD-10-CM

## 2022-11-03 DIAGNOSIS — F32.A DEPRESSION WITH SUICIDAL IDEATION: Primary | ICD-10-CM

## 2022-11-03 DIAGNOSIS — F10.920 ACUTE ALCOHOLIC INTOXICATION WITHOUT COMPLICATION (HCC): ICD-10-CM

## 2022-11-03 LAB
ACETAMINOPHEN LEVEL: <5 UG/ML (ref 15–30)
ALBUMIN SERPL-MCNC: 3.3 GM/DL (ref 3.4–5)
ALCOHOL SCREEN SERUM: 0.19 %WT/VOL
ALP BLD-CCNC: 91 IU/L (ref 40–129)
ALT SERPL-CCNC: 13 U/L (ref 10–40)
ANION GAP SERPL CALCULATED.3IONS-SCNC: 14 MMOL/L (ref 4–16)
AST SERPL-CCNC: 17 IU/L (ref 15–37)
BASOPHILS ABSOLUTE: 0 K/CU MM
BASOPHILS RELATIVE PERCENT: 0.4 % (ref 0–1)
BILIRUB SERPL-MCNC: 0.2 MG/DL (ref 0–1)
BUN BLDV-MCNC: 7 MG/DL (ref 6–23)
CALCIUM SERPL-MCNC: 8.6 MG/DL (ref 8.3–10.6)
CHLORIDE BLD-SCNC: 99 MMOL/L (ref 99–110)
CO2: 20 MMOL/L (ref 21–32)
CREAT SERPL-MCNC: 1.3 MG/DL (ref 0.9–1.3)
DIFFERENTIAL TYPE: ABNORMAL
DOSE AMOUNT: ABNORMAL
DOSE AMOUNT: ABNORMAL
DOSE TIME: ABNORMAL
DOSE TIME: ABNORMAL
EOSINOPHILS ABSOLUTE: 0.2 K/CU MM
EOSINOPHILS RELATIVE PERCENT: 1.6 % (ref 0–3)
GFR SERPL CREATININE-BSD FRML MDRD: >60 ML/MIN/1.73M2
GLUCOSE BLD-MCNC: 89 MG/DL (ref 70–99)
HCT VFR BLD CALC: 38.9 % (ref 42–52)
HEMOGLOBIN: 12.7 GM/DL (ref 13.5–18)
IMMATURE NEUTROPHIL %: 0.5 % (ref 0–0.43)
LYMPHOCYTES ABSOLUTE: 2.7 K/CU MM
LYMPHOCYTES RELATIVE PERCENT: 25.2 % (ref 24–44)
MCH RBC QN AUTO: 30 PG (ref 27–31)
MCHC RBC AUTO-ENTMCNC: 32.6 % (ref 32–36)
MCV RBC AUTO: 91.7 FL (ref 78–100)
MONOCYTES ABSOLUTE: 0.8 K/CU MM
MONOCYTES RELATIVE PERCENT: 7.1 % (ref 0–4)
NUCLEATED RBC %: 0 %
PDW BLD-RTO: 16.4 % (ref 11.7–14.9)
PLATELET # BLD: 569 K/CU MM (ref 140–440)
PMV BLD AUTO: 8.4 FL (ref 7.5–11.1)
POTASSIUM SERPL-SCNC: 4.1 MMOL/L (ref 3.5–5.1)
RBC # BLD: 4.24 M/CU MM (ref 4.6–6.2)
SALICYLATE LEVEL: <0.3 MG/DL (ref 15–30)
SARS-COV-2, NAAT: NOT DETECTED
SEGMENTED NEUTROPHILS ABSOLUTE COUNT: 6.9 K/CU MM
SEGMENTED NEUTROPHILS RELATIVE PERCENT: 65.2 % (ref 36–66)
SODIUM BLD-SCNC: 133 MMOL/L (ref 135–145)
SOURCE: NORMAL
TOTAL IMMATURE NEUTOROPHIL: 0.05 K/CU MM
TOTAL NUCLEATED RBC: 0 K/CU MM
TOTAL PROTEIN: 7.8 GM/DL (ref 6.4–8.2)
WBC # BLD: 10.6 K/CU MM (ref 4–10.5)

## 2022-11-03 PROCEDURE — 80053 COMPREHEN METABOLIC PANEL: CPT

## 2022-11-03 PROCEDURE — 85025 COMPLETE CBC W/AUTO DIFF WBC: CPT

## 2022-11-03 PROCEDURE — G0480 DRUG TEST DEF 1-7 CLASSES: HCPCS

## 2022-11-03 PROCEDURE — 99285 EMERGENCY DEPT VISIT HI MDM: CPT

## 2022-11-03 PROCEDURE — 87635 SARS-COV-2 COVID-19 AMP PRB: CPT

## 2022-11-03 NOTE — CARE COORDINATION
Attempted phone call to [employer] with [employer]. No answer, left a message with [employer] requesting return call back, contact information provided. KOLE plan of care:  SW will await return call from [employer] with [facility] regarding referral status. SW will attempt to reach [employer] on 11/4 if this SW does not receive return call by then. SW will update Pt once an update is obtained.

## 2022-11-04 ENCOUNTER — CARE COORDINATION (OUTPATIENT)
Dept: CARE COORDINATION | Age: 42
End: 2022-11-04

## 2022-11-04 LAB
ALCOHOL SCREEN SERUM: 0.02 %WT/VOL
ALCOHOL SCREEN SERUM: 0.08 %WT/VOL
AMPHETAMINES: NEGATIVE
BARBITURATE SCREEN URINE: NEGATIVE
BENZODIAZEPINE SCREEN, URINE: NEGATIVE
BILIRUBIN URINE: NEGATIVE MG/DL
BLOOD, URINE: NEGATIVE
CANNABINOID SCREEN URINE: NEGATIVE
CLARITY: CLEAR
COCAINE METABOLITE: NEGATIVE
COLOR: YELLOW
GLUCOSE, URINE: NEGATIVE MG/DL
KETONES, URINE: NEGATIVE MG/DL
LEUKOCYTE ESTERASE, URINE: NEGATIVE
NITRITE URINE, QUANTITATIVE: NEGATIVE
OPIATES, URINE: NEGATIVE
OXYCODONE: NEGATIVE
PH, URINE: 5.5 (ref 5–8)
PHENCYCLIDINE, URINE: NEGATIVE
PROTEIN UA: NEGATIVE MG/DL
SPECIFIC GRAVITY UA: <1.005 (ref 1–1.03)
UROBILINOGEN, URINE: 0.2 MG/DL (ref 0.2–1)

## 2022-11-04 PROCEDURE — 80307 DRUG TEST PRSMV CHEM ANLYZR: CPT

## 2022-11-04 PROCEDURE — G0480 DRUG TEST DEF 1-7 CLASSES: HCPCS

## 2022-11-04 PROCEDURE — 81003 URINALYSIS AUTO W/O SCOPE: CPT

## 2022-11-04 NOTE — ED NOTES
Patient will be referred for further evaluation and assessment @ a psychiatric hospital once medically cleared.      HAKEEM Wright  11/04/22 2901

## 2022-11-04 NOTE — CARE COORDINATION
Phone call to John Dies with [facility] to obtain update regarding placement status. John Dies reported she needs know stages of his wounds and [facility] is waiting on certified letter from PCP for intermediate care. KOLE notified Ackevin Vareladler of the requests of the AL facility as they cannot accept Pt if his wounds are greater than stage 3. [facility] responded via secure email that they would need to obtain those documents from Wound care.       KOLE plan of care:  [facility]LE will follow up with [facility] and [facility] regarding needed materials for placement on 11/8

## 2022-11-04 NOTE — ED NOTES
Report given to [facility]. Care transferred at this time.       Fuentes Silverman RN  11/04/22 8425

## 2022-11-04 NOTE — ED PROVIDER NOTES
eMERGENCY dEPARTMENT eNCOUnter    ATTENDING SIGN OUT NOTE    HPI/Physical Exam/Medical Decision Making  Time: 06:00 am  I have received sign out of [facility]  Emergency Department care from Dr. Hector Calabrese. We discussed the history, physical exam, completed/pending test results (if obtained) and current treatment plan. Please refer to his/her chart for further details. In brief, the patient is a 43 y.o. male who presented to the ED with depression and suicidal ideation. He held a sword to his throat and threatened to kill himself. He is reported to me is medically cleared and is awaiting mental health assessment. 08:30 am  Mental health  Cleopatra Oh has assessed the patient and is recommending inpatient psychiatric admission. The patient will be difficult to place as he has a wound VAC. [facility] is attempting to find placement. 18:15  Patient has been stable throughout the day. We are still attempting to find placement. I have signed out [facility]  Emergency Department care to Dr. Mary Tijerina. We discussed the history, physical exam, completed/pending test results (if obtained) and current treatment plan. Please refer to his/her chart for further details, remaining Emergency Department course, final disposition and diagnosis.       Diagnostics:  Labs Reviewed   CBC WITH AUTO DIFFERENTIAL - Abnormal; Notable for the following components:       Result Value    WBC 10.6 (*)     RBC 4.24 (*)     Hemoglobin 12.7 (*)     Hematocrit 38.9 (*)     RDW 16.4 (*)     Platelets 494 (*)     Monocytes % 7.1 (*)     Immature Neutrophil % 0.5 (*)     All other components within normal limits   COMPREHENSIVE METABOLIC PANEL - Abnormal; Notable for the following components:    Sodium 133 (*)     CO2 20 (*)     Albumin 3.3 (*)     All other components within normal limits   ACETAMINOPHEN LEVEL - Abnormal; Notable for the following components:    Acetaminophen Level <5.0 (*)     All other components within normal limits   SALICYLATE LEVEL - Abnormal; Notable for the following components:    Salicylate Lvl <7.0 (*)     All other components within normal limits   ETHANOL - Abnormal; Notable for the following components:    Alcohol Scrn 0.19 (*)     All other components within normal limits   ETHANOL - Abnormal; Notable for the following components:    Alcohol Scrn 0.08 (*)     All other components within normal limits   COVID-19, RAPID   URINALYSIS   URINE DRUG SCREEN         Clinical Impression:  1. Depression with suicidal ideation    2. [facility], [facility]    3. Acute alcoholic intoxication without complication ([facility]            Comment: Please note this report has been produced using speech recognition software and may contain errors related to that system including errors in grammar, punctuation, and spelling, as well as words and phrases that may be inappropriate. If there are any questions or concerns please feel free to contact the dictating provider for clarification.         Ariana Dawkins MD  11/05/22 7401

## 2022-11-04 NOTE — ED NOTES
Patient referred to [facility] for assist with placement.      HAKEEM Ma  11/04/22 1037

## 2022-11-04 NOTE — ED NOTES
This tech is sitting with pt per safety/suicide precautions. Pt is sleeping at this time, no needs expressed.       Emery Smith  11/04/22 0715

## 2022-11-04 NOTE — ED NOTES
Chief Complaint:    SI      Provisional Diagnosis:   MDD with SI  Anxiety      Risk, Psychosocial and Contextual Factors: Minimal support in community, recent BKA      Current MH Treatment: Denies any current outpt. MH tx. Patient has been in [place] recently and [facility] for treatment of mental health        Present Suicidal Behavior: YES    Verbal: Patient states he doesn't want to live anymore    Attempt: Patient was holding a sword to his neck, when police intervened. Access to Weapons: Patient states he believes police removed swords. Denies any access to guns. C-SSRS Current Suicide Risk: Low, Moderate or High:      HIGH RISK    Past Suicidal Behavior: YES    Verbal: Patient has voiced previous SI    Attempts: No actual attempts, but with plan to overdose      Self-Injurious/Self-Mutilation:DENIES      Traumatic Event Within Past 2 Weeks: Patient lives alone in his parent's home. Both parent's have passed and patient is experiencing much grief. Mom passed in [id number] and dad a few years ago. He always lived with parents. Patient also has recent BKA 2022      Current Abuse:  Denies current or past      Legal: Denies      Violence: Denies      Protective Factors:  Patient states he has a  that is helping him eventually get into an assisted living. Housing: Lives alone in own home. Clinical Summary:  Patient presents to ED by EMS/SPD after he was found holding a sword to his neck. Patient states he just want to die to be with his parents. Patient's mom  in May 2022 and dad a few years ago. Patient always lived with his parents and now lives alone in their old home. Patient expresses much grief over the loss of his parents. Patient also had a recent [address] in 2022. Patient admits to [facility] with plan to overdose, stating it would be easy since he is diabetic. Patient denies HI. Patient admits to audio and visual hallucinations.  Patient states voices he hears, tell him to kill himself to be with parents. Patient states he sleeps an average of 2-3 hrs. per night. Patient states appetite is poor. Patient admits to alcohol use and marijuana use. Patient states he has a  that is working on getting him into an assisted living. Patient would benefit from further evaluation and treatment @ [facility].      Level of Care Disposition:      Consulted with medical provider. Patient is medically stabilized. Consulted with patients RN about abnormalities or medical concerns. No abnormalities or medical concerns noted.   Consulted with_Dr. Horton Albion, attending__Patient to be._referred for further evaluation and treatment @ [facility].           HAKEEM Leal  11/04/22 0943

## 2022-11-04 NOTE — ED PROVIDER NOTES
Patient is endorsed to me by Dr. Bee Dumont at 0000. In short, patient presented with suicidal ideation. The patient was placed in suicide precautions, patient's clothing and belongings were removed, documented and stored in the emergency department. Patient is signed out to me awaiting medical clearance pending clinical studies. Once the patient is medically cleared, mental health will be consulted to evaluate the patient for possible inpatient treatment. 0600:a.m.  I have signed out [address] [place] [address] Emergency Department care to Dr. Sophie GARRETT, [facility]. We discussed the pertinent history, physical exam, completed/pending test results (if applicable) and current treatment plan. Please refer to his/her chart for the patients remaining Emergency Department course and final disposition.             Kel Hernandez MD  11/04/22 5054

## 2022-11-04 NOTE — ED PROVIDER NOTES
eMERGENCY dEPARTMENT eNCOUnter      PCP: Adalid Guerra, APRN - CNP    CHIEF COMPLAINT    Chief Complaint   Patient presents with    Suicidal     Pt presents to ED via EMS and police. Per police, pt had a sword up to his throat after calling 911 for SI.        HPI    Sean Cortez is a 43 y.o. male who presents via EMS/SPD for suicidal ideation. Context is patient states she has a long history of depression/anxiety, has been admitted to inpatient psychiatric for depression and suicidal attempt in the past by intentional drug overdose. Today, states that he drank 10-12 beers as he was feeling more sadness that \"I am alone in the world. \"  States that his father passed away 2 years ago his mother recently passed away in May 2022 and he has nobody in the area that cares for him. He states that he has daily auditory hallucinations telling him \"it would be better if you just end her life and join your parents on the other side. \"  States that today, he had a strong desire to end his life, state \"I does not want to be around anymore, it is not worth living. \"  He held a sharp and sword that he owns to the right side of his neck but did call police. States that he left the door open and police were able to talk him down. He is pink slipped upon ED arrival.  Patient states that \"I do not know why he called the police or open the door for them because I should have just slit my throat before they got there, I really want to die. \"  Denying any homicidal thoughts, no visual hallucinations. She does admit to smoking marijuana in the past but no illicit street drug use today. Denying any other chest pain or shortness of breath. Did not sustain any injuries to the soft tissue neck. She states the police officers removed his other 6 sharp and swords from home, denies any firearms        REVIEW OF SYSTEMS    Psychiatric: See HPI.     General: No fevers or chills  Cardiac:  No chest pain or palpitations  Respiratory: No TRANSMETATARSAL TOE AMPUTATION performed by Ольга Carcamo DPM at [address] [place] [address] Left 4/23/2022    LEFT RAY GREAT TOE AMPUTATION performed by Gladys Dc MD at [facility]    Current Outpatient Rx   Medication Sig Dispense Refill    sulfamethoxazole-trimethoprim (BACTRIM DS;SEPTRA DS) 800-160 MG per tablet Take 1 tablet by mouth 2 times daily for 14 days 28 tablet 0    pantoprazole (PROTONIX) 40 MG tablet       carvedilol (COREG) 12.5 MG tablet Take 1 tablet by mouth 2 times daily (with meals) 60 tablet 0    losartan (COZAAR) 100 MG tablet Take 1 tablet by mouth daily 30 tablet 0    clopidogrel (PLAVIX) 75 MG tablet Take 1 tablet by mouth daily 30 tablet 0    docusate sodium (COLACE, DULCOLAX) 100 MG CAPS Take 100 mg by mouth daily      aspirin 81 MG chewable tablet Take 81 mg by mouth daily      pantoprazole (PROTONIX) 20 MG tablet Take 1 tablet by mouth every morning (before breakfast) 30 tablet 0    sucralfate (CARAFATE) 1 GM/10ML suspension Take 10 mLs by mouth 4 times daily for 7 days 414 mL 0    glyBURIDE (DIABETA) 5 MG tablet Take 2 tablets by mouth in the morning and at bedtime 120 tablet 1    Dulaglutide 1.5 MG/0.5ML SOPN Inject 1.5 mg into the skin once a week 5 pen 3    FLUoxetine (PROZAC) 40 MG capsule Take 2 capsules by mouth in the morning. 30 capsule 3    insulin glargine (LANTUS SOLOSTAR) 100 UNIT/ML injection pen Inject 30 Units into the skin nightly 5 pen 2    Alcohol Swabs PADS       atorvastatin (LIPITOR) 40 MG tablet Take 1 tablet by mouth nightly 90 tablet 1    blood glucose monitor strips Test 2 times a day & as needed for symptoms of irregular blood glucose. Dispense sufficient amount for indicated testing frequency plus additional to accommodate PRN testing needs.  100 strip 0    blood glucose monitor kit and supplies Dispense sufficient amount for indicated testing frequency plus additional to accommodate PRN testing needs. Dispense all needed supplies to include: monitor, strips, lancing device, lancets, control solutions, alcohol swabs. 1 kit 0    FreeStyle Lancets MISC 1 each by Does not apply route daily 100 each 3       ALLERGIES    No Known Allergies    SOCIAL & FAMILYHISTORY    Social History     Socioeconomic History    Marital status: Single   Tobacco Use    Smoking status: Never    Smokeless tobacco: Never   Vaping Use    Vaping Use: Never used   Substance and Sexual Activity    Alcohol use: Yes     Comment: occasionally    Drug use: No    Sexual activity: Yes     Partners: Female     Social Determinants of Health     Financial Resource Strain: Medium Risk    Difficulty of Paying Living Expenses: Somewhat hard   Food Insecurity: Food Insecurity Present    Worried About Running Out of Food in the Last Year: Sometimes true    Ran Out of Food in the Last Year: Sometimes true   Transportation Needs: No Transportation Needs    Lack of Transportation (Medical): No    Lack of Transportation (Non-Medical):  No   Physical Activity: Inactive    Days of Exercise per Week: 0 days    Minutes of Exercise per Session: 0 min   Stress: Stress Concern Present    Feeling of Stress : Rather much   Social Connections: Unknown    Frequency of Communication with Friends and Family: Once a week    Attends Restoration Services: Never    Attends Club or Organization Meetings: Never    Marital Status: Never    Housing Stability: Low Risk     Unable to Pay for Housing in the Last Year: No    Number of Places Lived in the Last Year: 1    Unstable Housing in the Last Year: No     Family History   Problem Relation Age of Onset    Heart Disease Father     Diabetes Father     Diabetes Mother     Heart Disease Mother     Other Mother     Kidney Disease Mother     Heart Attack Mother        PHYSICAL EXAM    VITAL SIGNS: BP (!) 152/93   Pulse 98   Temp 99 °F (37.2 °C) (Oral)   Resp 20   SpO2 94%   Constitutional:  Well developed, well nourished, tearful anxious depressed. Eyes:  EOMI. PERRL, conjunctiva normal   HENT:  Atraumatic, external ears normal, nose normal   Neck/Lymphatics: supple, no JVD, no swollen nodes. No thyromegaly or thyroid nodules. No skin lacerations or injuries to the anterior soft tissue neck. No bruising discoloration. Respiratory:  No respiratory distress, normal breath sounds  Cardiovascular:  Normal rate, normal rhythm, no murmurs  GI:  Soft, nondistended, normal bowel sounds, nontender  Musculoskeletal:  No edema, no acute deformities   Integument:  Well hydrated   Neurologic:  Awake alert and oriented, no slurred speech, normal gross motor coordination and strength   Psychiatric: Depressed flattened affect, tearful throughout exam.  Does endorse persistent thoughts of self-harm with plan. Has had auditory hallucinations the past but not respond to any internal stimuli at this time. No homicidal ideation. No flight of ideas. Appropriate thought content.       LABS:  Results for orders placed or performed during the hospital encounter of 11/03/22   COVID-19, Rapid    Specimen: Nasopharyngeal   Result Value Ref Range    Source UNKNOWN     SARS-CoV-2, NAAT NOT DETECTED NOT DETECTED   CBC with Auto Differential   Result Value Ref Range    WBC 10.6 (H) 4.0 - 10.5 K/CU MM    RBC 4.24 (L) 4.6 - 6.2 M/CU MM    Hemoglobin 12.7 (L) 13.5 - 18.0 GM/DL    Hematocrit 38.9 (L) 42 - 52 %    MCV 91.7 78 - 100 FL    MCH 30.0 27 - 31 PG    MCHC 32.6 32.0 - 36.0 %    RDW 16.4 (H) 11.7 - 14.9 %    Platelets 627 (H) 025 - 440 K/CU MM    MPV 8.4 7.5 - 11.1 FL    Differential Type AUTOMATED DIFFERENTIAL     Segs Relative 65.2 36 - 66 %    Lymphocytes % 25.2 24 - 44 %    Monocytes % 7.1 (H) 0 - 4 %    Eosinophils % 1.6 0 - 3 %    Basophils % 0.4 0 - 1 %    Segs Absolute 6.9 K/CU MM    Lymphocytes Absolute 2.7 K/CU MM    Monocytes Absolute 0.8 K/CU MM    Eosinophils Absolute 0.2 K/CU MM    Basophils Absolute 0.0 K/CU MM    Nucleated RBC % 0.0 %    Total Nucleated RBC 0.0 K/CU MM    Total Immature Neutrophil 0.05 K/CU MM    Immature Neutrophil % 0.5 (H) 0 - 0.43 %   Comprehensive Metabolic Panel   Result Value Ref Range    Sodium 133 (L) 135 - 145 MMOL/L    Potassium 4.1 3.5 - 5.1 MMOL/L    Chloride 99 99 - 110 mMol/L    CO2 20 (L) 21 - 32 MMOL/L    BUN 7 6 - 23 MG/DL    Creatinine 1.3 0.9 - 1.3 MG/DL    Est, Glom Filt Rate >60 >60 mL/min/1.73m2    Glucose 89 70 - 99 MG/DL    Calcium 8.6 8.3 - 10.6 MG/DL    Albumin 3.3 (L) 3.4 - 5.0 GM/DL    Total Protein 7.8 6.4 - 8.2 GM/DL    Total Bilirubin 0.2 0.0 - 1.0 MG/DL    ALT 13 10 - 40 U/L    AST 17 15 - 37 IU/L    Alkaline Phosphatase 91 40 - 129 IU/L    Anion Gap 14 4 - 16   Acetaminophen Level   Result Value Ref Range    Acetaminophen Level <5.0 (L) 15 - 30 ug/ml    DOSE AMOUNT DOSE AMT. GIVEN - UNKNOWN     DOSE TIME DOSE TIME GIVEN - UNKNOWN    Salicylate   Result Value Ref Range    Salicylate Lvl <9.7 (L) 15 - 30 MG/DL    DOSE AMOUNT DOSE AMT. GIVEN - UNKNOWN     DOSE TIME DOSE TIME GIVEN - UNKNOWN    Ethanol   Result Value Ref Range    Alcohol Scrn 0.19 (H) <0.01 %WT/VOL              ED COURSE & MEDICAL DECISION MAKING    Pertinent Labs & Imaging studies reviewed and interpreted. (See chart for details)    Vitals:    11/03/22 2115   BP: (!) 152/93   Pulse: 98   Resp: 20   Temp: 99 °F (37.2 °C)   TempSrc: Oral   SpO2: 94%       There are no medical problems identified which require immediate intervention or which would preclude psychiatric evaluation    Consultation: Mental health Professional consulted in the emergency Department. See [facility] note for details of MH evaluation. Vital signs and nursing notes reviewed during ED course. Patient care and presentation staffed with supervising MD - Dr Rusty Giron. All pertinent Lab data and radiographic results reviewed with patient at bedside.        The patient and/or the family were informed of the results of any tests/labs/imaging, the treatment plan, and time was allotted to answer questions. Differential diagnoses: Drug or alcohol use or abuse, psychosis, behavioral mental illness, metabolic disturbance, infection, neurologic emergency, other    Clinical  IMPRESSION    1. Depression with suicidal ideation    2. Suicide gesture, initial encounter ([facility]    3. Acute alcoholic intoxication without complication [facility])        Patient presents for depression and suicidal ideation/attempt. On exam, very tearful 59-year-old male, anxious with flat affect and appears grossly depressed. Does continue to endorse thoughts of self-harm by slitting his throat with Swords. Was pink slipped by SPD. No evidence of injury to this anterior soft tissue neck, no lacerations requiring repair. Has had auditory hallucinations, command, telling him to kill himself but no active hallucinations auditory or visual at this time. No other evidence of injury to the chest wall, abdomen or extremities. One-to-one sitter was placed at bedside, suicide precautions were initiated. EtOH level 0.19, pending UDS. Did order repeat EtOH at 4AM on 11/4/2022 for medical clearance prior to psychiatric evaluation. Patient will be signed off to oncoming physician Dr Venita Bosch for final disposition. Anticipate admission for psychiatric stabilizations      I did discuss this patient's history, ED presentation/course with my attending physician - Dr. Shea Madrid - who has also seen and evaluated this patient. Please see his/her note for additional details of their evaluation.       (Please note the MDM and HPI sections of this note were completed with a voice recognition program.  Efforts were made to edit the dictations but occasionally words are mis-transcribed.)       Charles Iglesias PA-C  11/03/22 1797

## 2022-11-04 NOTE — ED NOTES
Report received from [facility]. Care assumed at this time.      Monik Recio RN  11/04/22 3294

## 2022-11-04 NOTE — ED PROVIDER NOTES
I independently examined and evaluated Andrés Montoya. In brief, 60-year-old presents with suicidal ideation. History of depression and anxiety. Drink tonight, was holding a sword to his throat. Police removed at least 6 swords from the home. Was pink slipped by police. Stating he wants to die. Denies hallucinations. Does have previous history of suicide attempt by drug overdose. Focused exam revealed patient laying on cot, crying, difficult to obtain history from. Regular rate and rhythm, nonlabored respirations. Abdomen soft and nondistended. No obvious lacerations noted. ED course: Plan for screening toxicology labs, ethanol, evaluation by mental health team, he is pink slipped, high risk    All diagnostic, treatment, and disposition decisions were made by myself in conjunction with the advanced practice provider. I personally saw the patient and performed a substantive portion of the visit including all aspects of the medical decision making. For all further details of the patient's emergency department visit, please see the advanced practice provider's documentation. Comment: Please note this report has been produced using speech recognition software and may contain errors related to that system including errors in grammar, punctuation, and spelling, as well as words and phrases that may be inappropriate. If there are any questions or concerns please feel free to contact the dictating provider for clarification.         Eleuterio Arnold MD  11/03/22 8792

## 2022-11-05 VITALS
HEART RATE: 88 BPM | TEMPERATURE: 97.9 F | SYSTOLIC BLOOD PRESSURE: 132 MMHG | WEIGHT: 158 LBS | OXYGEN SATURATION: 99 % | DIASTOLIC BLOOD PRESSURE: 90 MMHG | BODY MASS INDEX: 21.43 KG/M2 | RESPIRATION RATE: 16 BRPM

## 2022-11-05 PROCEDURE — 99284 EMERGENCY DEPT VISIT MOD MDM: CPT | Performed by: PSYCHIATRY & NEUROLOGY

## 2022-11-05 NOTE — ED NOTES
Patient is endorsed to me by Dr. Damien Khan at 0000. In short, patient presented with suicidal ideation. The patient was placed in suicide precautions, patient's clothing and belongings were removed, documented and stored in the emergency department. Patient was reported to me to be medically cleared. I have examined the patient and noted a normal exam and stable vitals. Mental health have evaluated the patient and have recommended that the patient be transferred to a inpatient psychiatric facility. We are currently awaiting placement for the patient. 0600:a.m.  I have signed out [address] [place] [address] Emergency Department care to Dr. Isreal Scott. We discussed the pertinent history, physical exam, completed/pending test results (if applicable) and current treatment plan. Please refer to his/her chart for the patients remaining Emergency Department course and final disposition.            Joao Alberts MD  11/05/22 3624

## 2022-11-05 NOTE — ED NOTES
Patient cleared by psychiatrist for discharge. Dr. Briana Lee , attending notified. Patient given resources for follow up [place] [address] care and emergency/crisis numbers. Patient agreeable to follow up. Completed a safety plan with patient. Patient voices he feels hopeful for future and feels safe returning to home.      HAKEEM Beckford  11/05/22 7599

## 2022-11-05 NOTE — ED PROVIDER NOTES
Patient care endorsed to me per Dr. Benjamin Salcedo. In brief this is a 44 yo M who presents with active SI. Also with multiple comorbidities. Currently seeking placement. Have discussed with Rl Paredes LMSW. She indicates patient is no longer suicidal.  He was reevaluated by Dr. Tom Michaud who is comfortable removing the pink slip. Case management has been able to assist him in arranging assisted living facility. This will be available in the next 2-3 days. Patient is given instructions regarding symptomatic care at home as well as return precautions. To call PCP for follow up in 2-3 days.       [address] [place] [address],   11/06/22 7997

## 2022-11-05 NOTE — CONSULTS
Psychiatric Consult  Fatuma Mcmahan  [id number]  11/3/2022  11/05/22            ID: Patient is a 43 y.o. male     CC: I was intoxicated and acting inappropriate     HPI:  Pt is a 42 yo  male who presents for exacerbation of depression and alcohol intoxications. Pt noted recent exacarbation of mood but feels safe on his current medications. Pt noted he currently feels safe and comfortable on the unit. Pt was in agreement with treatment team.  Pt was polite and cordial during the interview process. Pt throughout his stay at [address] [place] [address] pt felt like his medications were working and  felt comfortable being discharged on these medications. Pt was advised to take all medications as prescribed, follow up with all scheduled appointments and abstain from any alcohol or illicit substances. Pt was in agreement. Pt felt safe and comfortable to be discharge and to follow up with outpt mental health. Pt was very optimistic about his D/C and returning to his home. Pt stated that he was doing \"good,\" today. Pt stated that he slept \"about 6 hours,\" last night. Pt stated that his appetite is \"good. \"  Pt stated that he rates his depression a  \"0,\" on a scale of 0-10 with 10 being the worst and 0 being none. Pt stated  that he rates his anxiety an \"0/10,\" on the same scale. Pt denies any auditory  or visual hallucinations. Pt denied any thoughts to harm himself or anyone else. Pt felt safe and comfortable for D/C. Pt denies any access to guns or weapons. The Pt was educated primarily by verbal means about his diagnoses and their manifestations in his life. The option for treatment including group individual therapy programming was offered to him and the use of medications with all their  potential risks, benefits, and side-effects were discussed with the pt at length. Pt was given the opportunity to ask questions and he participated in the treatment and planning process.  Pt felt ready and eager to be discharged from the from [facility]. Pt felt he was safe for this disposition. Pt was considered to be able to participate in informed consent and decision-making with respect to medical,  legal and financial issues at the time of his discharge from [facility].      Pt denied any hx of seizures, TBIs, Hep C or HIV  No TD noted, AIMS=0,      Pt noted hx of previous inpt psychiatric admissions  Pt denied any previous suicide attempts  Pt denied any family hx of suicides  Pt denied any family mental health hx     Pt denied any hx of abuse trauma or neglect, physical sexual or emotional.       Alcohol: I dont drink much but when I do its too much  Street drugs: denies any current  Tobacco: denies any current  Caffeine: 2-3 per day      Past Psychiatric History:   See note above       Family Psychiatric History:   Family History   Problem Relation Age of Onset    Heart Disease Father     Diabetes Father     Diabetes Mother     Heart Disease Mother     Other Mother     Kidney Disease Mother     Heart Attack Mother         Allergies:  No Known Allergies     OBJECTIVE  Vital Signs:  Vitals:    11/05/22 0803   BP: (!) 132/90   Pulse: 88   Resp: 16   Temp: 97.9 °F (36.6 °C)   SpO2: 99%       Labs:  Recent Results (from the past 48 hour(s))   CBC with Auto Differential    Collection Time: 11/03/22  9:35 PM   Result Value Ref Range    WBC 10.6 (H) 4.0 - 10.5 K/CU MM    RBC 4.24 (L) 4.6 - 6.2 M/CU MM    Hemoglobin 12.7 (L) 13.5 - 18.0 GM/DL    Hematocrit 38.9 (L) 42 - 52 %    MCV 91.7 78 - 100 FL    MCH 30.0 27 - 31 PG    MCHC 32.6 32.0 - 36.0 %    RDW 16.4 (H) 11.7 - 14.9 %    Platelets 189 (H) 355 - 440 K/CU MM    MPV 8.4 7.5 - 11.1 FL    Differential Type AUTOMATED DIFFERENTIAL     Segs Relative 65.2 36 - 66 %    Lymphocytes % 25.2 24 - 44 %    Monocytes % 7.1 (H) 0 - 4 %    Eosinophils % 1.6 0 - 3 %    Basophils % 0.4 0 - 1 %    Segs Absolute 6.9 K/CU MM    Lymphocytes Absolute 2.7 K/CU MM    Monocytes Absolute 0.8 K/CU MM    Eosinophils Absolute 0.2 K/CU MM    Basophils Absolute 0.0 K/CU MM    Nucleated RBC % 0.0 %    Total Nucleated RBC 0.0 K/CU MM    Total Immature Neutrophil 0.05 K/CU MM    Immature Neutrophil % 0.5 (H) 0 - 0.43 %   Comprehensive Metabolic Panel    Collection Time: 11/03/22  9:35 PM   Result Value Ref Range    Sodium 133 (L) 135 - 145 MMOL/L    Potassium 4.1 3.5 - 5.1 MMOL/L    Chloride 99 99 - 110 mMol/L    CO2 20 (L) 21 - 32 MMOL/L    BUN 7 6 - 23 MG/DL    Creatinine 1.3 0.9 - 1.3 MG/DL    Est, Glom Filt Rate >60 >60 mL/min/1.73m2    Glucose 89 70 - 99 MG/DL    Calcium 8.6 8.3 - 10.6 MG/DL    Albumin 3.3 (L) 3.4 - 5.0 GM/DL    Total Protein 7.8 6.4 - 8.2 GM/DL    Total Bilirubin 0.2 0.0 - 1.0 MG/DL    ALT 13 10 - 40 U/L    AST 17 15 - 37 IU/L    Alkaline Phosphatase 91 40 - 129 IU/L    Anion Gap 14 4 - 16   Acetaminophen Level    Collection Time: 11/03/22  9:35 PM   Result Value Ref Range    Acetaminophen Level <5.0 (L) 15 - 30 ug/ml    DOSE AMOUNT DOSE AMT. GIVEN - UNKNOWN     DOSE TIME DOSE TIME GIVEN - UNKNOWN    Salicylate    Collection Time: 11/03/22  9:35 PM   Result Value Ref Range    Salicylate Lvl <9.2 (L) 15 - 30 MG/DL    DOSE AMOUNT DOSE AMT.  GIVEN - UNKNOWN     DOSE TIME DOSE TIME GIVEN - UNKNOWN    Ethanol    Collection Time: 11/03/22  9:35 PM   Result Value Ref Range    Alcohol Scrn 0.19 (H) <0.01 %WT/VOL   COVID-19, Rapid    Collection Time: 11/03/22  9:35 PM    Specimen: Nasopharyngeal   Result Value Ref Range    Source UNKNOWN     SARS-CoV-2, NAAT NOT DETECTED NOT DETECTED   Urinalysis    Collection Time: 11/04/22  1:42 AM   Result Value Ref Range    Color, UA YELLOW YELLOW    Clarity, UA CLEAR CLEAR    Glucose, Urine NEGATIVE NEGATIVE MG/DL    Bilirubin Urine NEGATIVE NEGATIVE MG/DL    Ketones, Urine NEGATIVE NEGATIVE MG/DL    Specific Gravity, UA <1.005 1.001 - 1.035    Blood, Urine NEGATIVE NEGATIVE    pH, Urine 5.5 5.0 - 8.0    Protein, UA NEGATIVE NEGATIVE MG/DL    Urobilinogen, Urine 0.2 0.2 - 1.0 MG/DL Nitrite Urine, Quantitative NEGATIVE NEGATIVE    Leukocyte Esterase, Urine NEGATIVE NEGATIVE   Urine Drug Screen    Collection Time: 11/04/22  1:42 AM   Result Value Ref Range    Cannabinoid Scrn, Ur NEGATIVE NEGATIVE    Amphetamines NEGATIVE NEGATIVE    Cocaine Metabolite NEGATIVE NEGATIVE    Benzodiazepine Screen, Urine NEGATIVE NEGATIVE    Barbiturate Screen, Ur NEGATIVE NEGATIVE    Opiates, Urine NEGATIVE NEGATIVE    Phencyclidine, Urine NEGATIVE NEGATIVE    Oxycodone NEGATIVE NEGATIVE   Ethanol    Collection Time: 11/04/22  4:15 AM   Result Value Ref Range    Alcohol Scrn 0.08 (H) <0.01 %WT/VOL   Ethanol    Collection Time: 11/04/22  7:57 AM   Result Value Ref Range    Alcohol Scrn 0.02 (H) <0.01 %WT/VOL       Review of Systems:  Reports of no current cardiovascular, respiratory, gastrointestinal, genitourinary, integumentary, neurological, muscuoskeletal, or immunological symptoms today. PSYCHIATRIC: See HPI above. Review of Systems:  Reports of no current cardiovascular, respiratory, gastrointestinal, genitourinary, integumentary, neurological, muscuoskeletal, or immunological symptoms today. PSYCHIATRIC: See HPI above. Neurologic examination:  Mental status: The patient is alert, attentive, and oriented. Speech is clear and fluent with good repetition, comprehension, and naming.            PSYCHIATRIC EXAMINATION / MENTAL STATUS EXAM                     General appearance: [x] appears age, []  appears older than stated age,                                     [x]  adequately dressed and groomed, [] disheveled,                                     [x]  in no acute distress, [] appears mildly distressed, [] other                                                                     MUSCULOSKELETAL:            Gait:                             [] normal, [] antalgic, [] unsteady, [x] gait not evaluated   Station:                        [] erect, [] sitting, [x] recumbent, [] other Strength/tone:             [x] muscle strength and tone appear consistent with age and                                        condition                                      [] atrophy                                       [] abnormal movements  PSYCHIATRIC:    Appearance: appears stated age. alert and oriented to person, place, time & situation. no acute distress. Adequate grooming and hygeine. Good eye contact. No prominent physical abnormalities. Attitude: Manner is cooperative and pleasant  Motor: No psychomotor agitation, retardation or abnormal movements noted  Speech: Clearly articulated; normal rate, volume, tone & amount. Language: intact understanding and production  Mood: okay  Affect: euthymic, full range, non-labile, congruent with mood and content of speech  Thought Production: Spontaneous. Thought Form: Coherent, linear, logical & goal-directed. No tangentiality or circumstantiality. No flight of ideas or loosening of associations. Thought Content/Perceptions: No MINERVA, no AVH, no delusion  Insight: fair  Judgment fair  Memory: Immediate, recent, and remote appear intact, though not formally tested. Attention: maintained throughout interview  Fund of knowledge: Average  Gait/Balance: WNL/WNL                                              Impression:   MDD moderate  Alcohol use Disorder moderate     Problem List:   <principal problem not specified>    Pt does not require admission to inpt psychiatry at this time, pt to follow up with out pt mental health upon discharge once medically cleared. Plan:  1. Reviewed treatment plan with patient including medication risks, benefits, side effects. Obtained informed consent for treatment. 2. Psychiatric management:medication initiation and titration, recommend outpt mental health follow up, safe and theraputic environment. 3. Status of problem/condition: ?Improving  4.  Medical co-morbidities: [facility], appreciate assistance  5. Legal Status: voluntary  6. The treatment team reviewed with the patient the diagnosis and treatment recommendations to include the risks, benefits, and side effects of chosen medications. 7. The patient verbalized understanding and agreed with the treatment regimen as outlined above. 8. Medical records, Labs, Diagnotic tests reviewed  9. Interval History. 10. Review current labs  11. Continue current medications  12. Supportive Therapy Provided  13. Pt had an opportunity to ask questions and address concerns  14. Pt encouraged to continue outpt  Therapy. 15. Pt was in agreement with treatment plan. 16. The risks benefits and side effects of medications were discussed with the patient, including alternatives and no treatment.

## 2022-11-05 NOTE — ED NOTES
Discharge instructions given. Pt verbalized understanding. Pt ambulated to waiting room. Pt given documented belongings.         Mickey Toro RN  11/05/22 5738

## 2022-11-05 NOTE — SUICIDE SAFETY PLAN
SAFETY PLAN    A suicide Safety Plan is a document that supports someone when they are having thoughts of suicide. Warning Signs that indicate a suicidal crisis may be developing: What (situations, thoughts, feelings, body sensations, behaviors, etc.) do you experience that lets you know you are beginning to think about suicide? 1. Having negative thoughts  2. Feeling tense throughout body  3. Feeling sad    Internal Coping Strategies:  What things can I do (relaxation techniques, hobbies, physical activities, etc.) to take my mind off my problems without contacting another person? 1. Play video games  2. Listen to calming music  3. Go outdoors for fresh air    People and social settings that provide distraction: Who can I call or where can I go to distract me? 1. Name: Cristy Recio  Phone: Programmed into phone  2. Name: Ashlyn Frye  Phone: [phone]   3. Place: Go outside to backyard                People whom I can ask for help: Who can I call when I need help - for example, friends, family, clergy, someone else? 1. Name: Ad Gordon                Phone: programmed in phone  2. Name: brother Ayon  Phone: [phone]      Professionals or [address] [place] [address] Blvd I can contact during a crisis: Who can I call for help - for example, my doctor, my psychiatrist, my psychologist, a mental health provider, a suicide hotline? 1. Clinician Name: [facility] coordinator   Phone: programmed in phone    2. Suicide Prevention Lifeline: 5-164-172-TALK (9516)    3. [address] [place] [address] Emergency Services -  for example, [facility] suicide hotline, [facility] Hotline: [facility]           Emergency Services Phone: [phone]    Making the environment safe: How can I make my environment (house/apartment/living space) safer? For example, can I remove guns, medications, and other items? 1.  Remove all weapons from home (Patient states police already removed the swords at his request.)  2.  Remove alcohol or any  , unused meds

## 2022-11-05 NOTE — ED PROVIDER NOTES
Patient handed off to me by colleague Dr. Ralph Magana pending placement to psychiatric facility. Patient presented with suicidal ideation has been pink slipped. Patient stable throughout my ED course. ICD-10-CM    1. Depression with suicidal ideation  F32. A     R45.851       2. Suicide gesture, initial encounter ([facility] [address]  Tiffanie. 8XXA       3.  Acute alcoholic intoxication without complication [facility])  X39.944              Brad Serna DO  11/05/22 0944

## 2022-11-05 NOTE — ED NOTES
I-pad in room for reassessment by psychiatrist.  Patient aware he will be reassessed.      HAKEEM Soliz  11/05/22 1145

## 2022-11-05 NOTE — ED NOTES
Report from Sunita Doss, [facility]. Care assumed at this time.       Shawn Navarrete RN  11/05/22 7289

## 2022-11-05 NOTE — ED NOTES
Spoke with patient. Patient states he is feeling better and is hoping for possible return to home. Patient states he gets depressed in his atmosphere at home as it is a reminder of his parents. [facility] care coordinator , Arnoldo Tucker, is assisting with finding patient placement in an assisted living. Explained to patient that he will need reassessed by psychiatrist today and psychiatrist will determine if patient 's pink slip can be rescinded.      HAKEEM Degroot  11/05/22 3700

## 2022-11-07 NOTE — DISCHARGE INSTRUCTIONS
PHYSICIAN ORDERS AND DISCHARGE INSTRUCTIONS     NOTE: Upon discharge from the [address], you will receive a patient experience survey. We would be grateful if you would take the time to fill this survey out. Wound cleansing:              Do not scrub or use excessive force. Wash hands with soap and water before and after dressing changes. Prior to applying a clean dressing, cleanse wound with normal saline, wound cleanser, or mild soap and water. Ask the physician or nurse before getting the wound(s) wet in a shower     Daily Wound management:              Keep weight off wounds and reposition every 2 hours. Avoid standing for long periods of time. Apply wraps/stockings in AM and remove at bedtime. If swelling is present, elevate legs to the level of the heart or above for 30 minutes 4-5 times a day and/or when sitting. When taking antibiotics take entire prescription as ordered by physician do not stop taking until medicine is all gone. Wound Care Notes:  Rx: Leonardo Baxter  Apply for grafts 05/11/22: aLL GRAFTS approved 06/08/22 for Left Toe amp site  Reapply for graft 08/22/22 for Left toes amp site   YADY's   Right 1.07      Left    1.1           Date: 08/15/22           Grafts Left Foot Amp Site  Puraply #1 Graft #1 4x4 fenestrated 06/15/22   Puraply #2 Graft #2 4x4 fenestrated 06/22/22                                    Orders for this week: 11/1/22     Wallace Letty 78 discharged pt due to safety issues 11/7/22 (PLEASE DELETE)      Left Great toe amp site - Apply A&D and Stimulen to wound bed. Left Plantar Foot wound -- Clean with soap and water, pat dry. Apply anasept gel, stimulen powder and ioplex to wound beds. Cover with Sorbex. Wrap with Coban 2 Lite. Leave in place for 1 week.         WOUND VAC THERAPY: Right Leg Amp Site     DUODERM TO PERIWOUND FOR PROTECTION. APPLY SORBACT OVER BONE, STIMULEN TO WOUND BED AND BLACK FOAM TO WOUND BED. SECURE VAC DRESSING WITH DRAPE. SET WOUND VAC  CONTINUOUS SUCTION. CANISTER CHANGE WEEKLY OR ACCORDING TO VOLUME OF DRAINAGE. Wrap with ACE wrap, but do not compress tubing against skin. WOUND VAC DRESSING TO BE CHANGED BY HOME CARE ON Friday    WOUND CARE CENTER WILL CHANGE ON  (IF VAC SUPPLIES SENT - NEED CANISTER AND BLACK FOAM DRESSING KIT). Follow Up Instructions: At the [address] [facility] [address] [facility] [address] in 1 week: Tuesday   Primary Wound Care Provider: Deon John CNP   Call  for any questions or concerns.   Central [phone]

## 2022-11-08 ENCOUNTER — HOSPITAL ENCOUNTER (OUTPATIENT)
Dept: WOUND CARE | Age: 42
Discharge: HOME | End: 2022-11-08
Payer: MEDICARE

## 2022-11-08 ENCOUNTER — CARE COORDINATION (OUTPATIENT)
Dept: CARE COORDINATION | Age: 42
End: 2022-11-08

## 2022-11-08 ENCOUNTER — TELEPHONE (OUTPATIENT)
Dept: FAMILY MEDICINE CLINIC | Age: 42
End: 2022-11-08

## 2022-11-08 VITALS — RESPIRATION RATE: 16 BRPM

## 2022-11-08 PROCEDURE — 29581 APPL MULTLAYER CMPRN SYS LEG: CPT

## 2022-11-08 PROCEDURE — 97605 NEG PRS WND THER DME<=50SQCM: CPT

## 2022-11-08 ASSESSMENT — PAIN DESCRIPTION - ONSET: ONSET: ON-GOING

## 2022-11-08 ASSESSMENT — PAIN DESCRIPTION - LOCATION: LOCATION: LEG

## 2022-11-08 ASSESSMENT — PAIN - FUNCTIONAL ASSESSMENT: PAIN_FUNCTIONAL_ASSESSMENT: PREVENTS OR INTERFERES SOME ACTIVE ACTIVITIES AND ADLS

## 2022-11-08 ASSESSMENT — PAIN DESCRIPTION - DESCRIPTORS: DESCRIPTORS: ACHING

## 2022-11-08 ASSESSMENT — PAIN DESCRIPTION - ORIENTATION: ORIENTATION: RIGHT

## 2022-11-08 ASSESSMENT — PAIN DESCRIPTION - FREQUENCY: FREQUENCY: CONTINUOUS

## 2022-11-08 ASSESSMENT — PAIN DESCRIPTION - PAIN TYPE: TYPE: SURGICAL PAIN

## 2022-11-08 NOTE — CARE COORDINATION
Phone call to Denia Martinez with [employer] to discuss progress with AL waiver placement. Denia Martinez reported they have not received physician certification letter, stating [facility] resent on 11/2 to Dr. Rosemary Acevedo and spoke to nurse, Nick Denson. Denia Martinez reported the facility is asking about the stage of Pt's wound. SW discussed plans to contact AL facility to notify them of wound care provider so they can contact them to obtain that information needed. Phone call to Pt to follow up from recent hospital admission / suicidal ideations and to discuss AL waiver. Pt reported he does not have a [place] [address] counselor and is agreeable to referral.  Marita Bolanos did provide contact information for [address]. Discussed with Pt, his alcohol use. Pt admits to having a drinking problem, states he does not abuse any other drugs. Pt reported he drinks beer and that he does wish to quit drinking and was agreeable to referral for IOP, declined referral for inpatient treatment. SW provided him with contact information for [facility] which is located near where he lives. Pt reported [address] will no longer see him based on his ER visit last week. Pt reported someone called 911 on him stating he was drunk and had weapons. Pt reported the police took his weapons and he was held in the ER for 24 hours for observation and then discharged home. Reviewed with Pt that if he is not eligible for AL at this time due to his wounds, he could be placed into SNF and then stepped down into AL facility when appropriate. Pt was in agreement, but did state he wants to see if AL placement will work out first before exploring this option. Attempted phone call to Edmond Trinidad, Liaison with [facility]. No answer, not able to leave message.      KOLE plan of care:   1) SW will make attempt to reach Edmond Trinidad with [place] [facility] on 11/9.   2)  SW will follow up with Pt on 11/11 regarding referrals to [facility] and for referral to IOP with Luis Armando Sherwood. 3) SW will route note to PCP to notify that [address] ended services and for referral for [facility].

## 2022-11-08 NOTE — TELEPHONE ENCOUNTER
Spoke with Samantha Barrera with  [phone]. Samantha Barrera stated pt is in process  enrollment with [facility]. Was transferred to [facility] voice mail . Left detailed message for Lowanda Cage re needing hel for this pt. Pt needs to be in assisted living. RAFALE Simon CNP routed conversation to You 17 minutes ago (2:31 PM)     HAKEEM Brady APRN - CNP 42 minutes ago (2:05 PM)     AH  I don't have Lowanda Cage with [employer] direct number, but she can be reached at [phone]. A woman by the name of Kris Cardona who is also working on the referral and may be able to confirm if you can't reach Lowanda Cage. Her direct number is [phone]     Julien's home health agency was [facility]. Thank you!!      MEGAN Dick APRN - CNP;  HAKEEM Brady

## 2022-11-08 NOTE — PROGRESS NOTES
Negative Pressure    NAME:  Parrish Obrien OF BIRTH:  1980  MEDICAL RECORD NUMBER:  [id number]  DATE:  11/8/2022    Applied Negative Pressure to RT LEG AMP SITE wound(s)/ulcer(s). [x] Applied skin barrier prep to serenity-wound. [x] Cut strips of plastic drape to picture frame wound so that serenity-wound is     covered with the drape. [x] If bridging dressing to less prominent site, cover any intact skin that will come in contact with the Negative Pressure Therapy sponge, gauze or channel drain with plastic drape. The sponge should never touch intact skin. [x] Cut sponge, gauze or channel drain to size which will fit into the wound/ulcer bed without being forced. [x] Be sure the sponge is large enough to hold the entire round plastic flange which is attached to the tubing. Never allow flange to be larger than the sponge or it will produce suction damaging intact skin. Total number of individual pieces of foam used within the wound bed: 1    [x] If bridging the dressing away from the primary site, be sure the bridge leads to a piece of sponge large enough to hold the entire flange without allowing any of the flange to overlap onto intact skin. [x] Covered sponge, gauze or channel drain with plastic drape. [x] Cut a hole in this plastic drape directly over the sponge the same size as the plastic drain tubing. [x] Removed plastic liner from flange and apply it directly over the hole you cut. [x] Removed the plastic cover from the flange. [x] Attached the tubing to the wound/ulcer Negative Pressure Therapy and turn it on to be sure a vacuum is created and that there are no leaks. [x] If air leaks occur, use plastic drape to patch them. [x] Secured Negative Pressure Therapy dressing with ace wrap loosely if located on an extremity. Maintain tubing outside of ace wrap. Tubing must not exert pressure on intact skin.     Applied per  Guidelines    Multilayer Compression Wrap   (Not Diane Sterling) Below the Knee    NAME:  Noni Burkitt OF BIRTH:  1980  MEDICAL RECORD NUMBER:  [id number]  DATE:  11/8/2022    Multilayer compression wrap: Removed old Multilayer wrap if indicated and wash leg with mild soap/water. Applied moisturizing agent to dry skin as needed. Applied primary and secondary dressing as ordered. Applied multilayered dressing below the knee to left lower leg. Instructed patient/caregiver not to remove dressing and to keep it clean and dry. Instructed patient/caregiver on complications to report to provider, such as pain, numbness in toes, heavy drainage, and slippage of dressing. Instructed patient on purpose of compression dressing and on activity and exercise recommendations.       Electronically signed by Nazia Malloy RN on 11/8/2022 at 2:51 PM       Electronically signed by Nazia Malloy RN on 11/8/2022 at 2:51 PM

## 2022-11-08 NOTE — TELEPHONE ENCOUNTER
[facility]3] & NURSING [facility] from [facility] agency on aging returned call. [facility] re pt admission, was told that pt is too high level of care d/t wound vac, they are sending order to [facility] skilled unit to be admitted until pt is well enough to be transferred to their assisted living. Asked if pt should have home care in the meantime? [facility] stated skilled care should move a lot faster than assisted livin. [facility] stated she is going to contact [facility] skilled unit get info and return call to our office.

## 2022-11-09 NOTE — TELEPHONE ENCOUNTER
bernadette Baca form [facility]illed [facility] returned call requested most recent hosp visit, med list, problem list be sent to their facility , porfirio grewal already received admission request from [facility], need PCP documents.   Faxed to [phone]

## 2022-11-09 NOTE — TELEPHONE ENCOUNTER
bernadette    Spoke with Re Solomon w/ [phone] who stated she hasn't heard anything from the [facility] unit [place]. Called and spoke with  Janiya Cheema with [facility], who stated that don't have him in yet , Janiya Cheema stated she will have someone call back with an update.

## 2022-11-10 ENCOUNTER — CARE COORDINATION (OUTPATIENT)
Dept: CARE COORDINATION | Age: 42
End: 2022-11-10

## 2022-11-10 ENCOUNTER — OFFICE VISIT (OUTPATIENT)
Dept: PSYCHOLOGY | Age: 42
End: 2022-11-10

## 2022-11-10 ENCOUNTER — TELEPHONE (OUTPATIENT)
Dept: FAMILY MEDICINE CLINIC | Age: 42
End: 2022-11-10

## 2022-11-10 ENCOUNTER — HOSPITAL ENCOUNTER (EMERGENCY)
Age: 42
Discharge: HOME | End: 2022-11-10
Attending: EMERGENCY MEDICINE
Payer: MEDICARE

## 2022-11-10 ENCOUNTER — APPOINTMENT (OUTPATIENT)
Dept: CT IMAGING | Age: 42
End: 2022-11-10
Payer: MEDICARE

## 2022-11-10 VITALS
OXYGEN SATURATION: 99 % | BODY MASS INDEX: 21.4 KG/M2 | RESPIRATION RATE: 15 BRPM | HEART RATE: 85 BPM | WEIGHT: 158 LBS | TEMPERATURE: 98.7 F | DIASTOLIC BLOOD PRESSURE: 105 MMHG | HEIGHT: 72 IN | SYSTOLIC BLOOD PRESSURE: 157 MMHG

## 2022-11-10 DIAGNOSIS — R11.2 NAUSEA AND VOMITING, UNSPECIFIED VOMITING TYPE: Primary | ICD-10-CM

## 2022-11-10 DIAGNOSIS — F33.1 MAJOR DEPRESSIVE DISORDER, RECURRENT, MODERATE (HCC): Primary | ICD-10-CM

## 2022-11-10 DIAGNOSIS — R10.84 GENERALIZED ABDOMINAL PAIN: ICD-10-CM

## 2022-11-10 LAB
ALBUMIN SERPL-MCNC: 4.1 GM/DL (ref 3.4–5)
ALP BLD-CCNC: 117 IU/L (ref 40–129)
ALT SERPL-CCNC: 17 U/L (ref 10–40)
ANION GAP SERPL CALCULATED.3IONS-SCNC: 14 MMOL/L (ref 4–16)
AST SERPL-CCNC: 28 IU/L (ref 15–37)
BASOPHILS ABSOLUTE: 0.1 K/CU MM
BASOPHILS RELATIVE PERCENT: 0.4 % (ref 0–1)
BILIRUB SERPL-MCNC: 0.5 MG/DL (ref 0–1)
BUN BLDV-MCNC: 5 MG/DL (ref 6–23)
CALCIUM SERPL-MCNC: 9.5 MG/DL (ref 8.3–10.6)
CHLORIDE BLD-SCNC: 98 MMOL/L (ref 99–110)
CO2: 25 MMOL/L (ref 21–32)
CREAT SERPL-MCNC: 1.1 MG/DL (ref 0.9–1.3)
DIFFERENTIAL TYPE: ABNORMAL
EOSINOPHILS ABSOLUTE: 0 K/CU MM
EOSINOPHILS RELATIVE PERCENT: 0.3 % (ref 0–3)
GFR SERPL CREATININE-BSD FRML MDRD: >60 ML/MIN/1.73M2
GLUCOSE BLD-MCNC: 143 MG/DL (ref 70–99)
HCT VFR BLD CALC: 46.6 % (ref 42–52)
HEMOGLOBIN: 15.4 GM/DL (ref 13.5–18)
IMMATURE NEUTROPHIL %: 0.3 % (ref 0–0.43)
LACTATE: 1.4 MMOL/L (ref 0.4–2)
LIPASE: 27 IU/L (ref 13–60)
LYMPHOCYTES ABSOLUTE: 1.9 K/CU MM
LYMPHOCYTES RELATIVE PERCENT: 16.7 % (ref 24–44)
MCH RBC QN AUTO: 30.1 PG (ref 27–31)
MCHC RBC AUTO-ENTMCNC: 33 % (ref 32–36)
MCV RBC AUTO: 91.2 FL (ref 78–100)
MONOCYTES ABSOLUTE: 0.9 K/CU MM
MONOCYTES RELATIVE PERCENT: 7.8 % (ref 0–4)
NUCLEATED RBC %: 0 %
PDW BLD-RTO: 17.1 % (ref 11.7–14.9)
PLATELET # BLD: 681 K/CU MM (ref 140–440)
PMV BLD AUTO: 8.2 FL (ref 7.5–11.1)
POTASSIUM SERPL-SCNC: 5.1 MMOL/L (ref 3.5–5.1)
RBC # BLD: 5.11 M/CU MM (ref 4.6–6.2)
SEGMENTED NEUTROPHILS ABSOLUTE COUNT: 8.6 K/CU MM
SEGMENTED NEUTROPHILS RELATIVE PERCENT: 74.5 % (ref 36–66)
SODIUM BLD-SCNC: 137 MMOL/L (ref 135–145)
TOTAL IMMATURE NEUTOROPHIL: 0.04 K/CU MM
TOTAL NUCLEATED RBC: 0 K/CU MM
TOTAL PROTEIN: 9.1 GM/DL (ref 6.4–8.2)
WBC # BLD: 11.5 K/CU MM (ref 4–10.5)

## 2022-11-10 PROCEDURE — 96374 THER/PROPH/DIAG INJ IV PUSH: CPT

## 2022-11-10 PROCEDURE — 99285 EMERGENCY DEPT VISIT HI MDM: CPT | Performed by: EMERGENCY MEDICINE

## 2022-11-10 PROCEDURE — 6360000002 HC RX W HCPCS: Performed by: NURSE PRACTITIONER

## 2022-11-10 PROCEDURE — 96376 TX/PRO/DX INJ SAME DRUG ADON: CPT

## 2022-11-10 PROCEDURE — 80053 COMPREHEN METABOLIC PANEL: CPT

## 2022-11-10 PROCEDURE — 85025 COMPLETE CBC W/AUTO DIFF WBC: CPT

## 2022-11-10 PROCEDURE — 83690 ASSAY OF LIPASE: CPT

## 2022-11-10 PROCEDURE — 74177 CT ABD & PELVIS W/CONTRAST: CPT

## 2022-11-10 PROCEDURE — 96372 THER/PROPH/DIAG INJ SC/IM: CPT

## 2022-11-10 PROCEDURE — 83605 ASSAY OF LACTIC ACID: CPT

## 2022-11-10 PROCEDURE — 6360000004 HC RX CONTRAST MEDICATION: Performed by: NURSE PRACTITIONER

## 2022-11-10 PROCEDURE — 6370000000 HC RX 637 (ALT 250 FOR IP): Performed by: NURSE PRACTITIONER

## 2022-11-10 RX ORDER — DICYCLOMINE HYDROCHLORIDE 10 MG/ML
20 INJECTION INTRAMUSCULAR ONCE
Status: COMPLETED | OUTPATIENT
Start: 2022-11-10 | End: 2022-11-10

## 2022-11-10 RX ORDER — ACETAMINOPHEN 500 MG
1000 TABLET ORAL ONCE
Status: COMPLETED | OUTPATIENT
Start: 2022-11-10 | End: 2022-11-10

## 2022-11-10 RX ORDER — ONDANSETRON 2 MG/ML
4 INJECTION INTRAMUSCULAR; INTRAVENOUS EVERY 6 HOURS PRN
Status: DISCONTINUED | OUTPATIENT
Start: 2022-11-10 | End: 2022-11-10 | Stop reason: HOSPADM

## 2022-11-10 RX ADMIN — ONDANSETRON 4 MG: 2 INJECTION INTRAMUSCULAR; INTRAVENOUS at 19:16

## 2022-11-10 RX ADMIN — DICYCLOMINE HYDROCHLORIDE 20 MG: 20 INJECTION INTRAMUSCULAR at 19:16

## 2022-11-10 RX ADMIN — ONDANSETRON 4 MG: 2 INJECTION INTRAMUSCULAR; INTRAVENOUS at 20:18

## 2022-11-10 RX ADMIN — ACETAMINOPHEN 1000 MG: 500 TABLET ORAL at 20:13

## 2022-11-10 RX ADMIN — IOPAMIDOL 75 ML: 755 INJECTION, SOLUTION INTRAVENOUS at 18:15

## 2022-11-10 ASSESSMENT — ENCOUNTER SYMPTOMS
ABDOMINAL PAIN: 1
CONSTIPATION: 0
SORE THROAT: 0
COUGH: 0
SHORTNESS OF BREATH: 0
COLOR CHANGE: 0
CHEST TIGHTNESS: 0
DIARRHEA: 1
VOMITING: 1
NAUSEA: 1
BLOOD IN STOOL: 0

## 2022-11-10 ASSESSMENT — PAIN DESCRIPTION - LOCATION: LOCATION: ABDOMEN

## 2022-11-10 ASSESSMENT — PAIN SCALES - GENERAL: PAINLEVEL_OUTOF10: 8

## 2022-11-10 NOTE — PROGRESS NOTES
Pt A&O, VSS-temps vary. Has been in severe pain all night and miserable even with PRN meds, hasn't slept much. Voiding adequately. NPO. Denies N&V, SOB, or dizziness-does have c/o headache. No other requests at this time, will continue to monitor. Pt not seen. Pt's home care nurse arrived and phone call was ended after 30 secs.

## 2022-11-10 NOTE — ED PROVIDER NOTES
[address] ENCOUNTER        Pt Name: Stefanie Camargo  MRN: [id number]  [place] 1980  Date of evaluation: 11/10/2022  Provider: RAFAEL Lay CNP  PCP: RAFAEL Corrales CNP  Note Started: 3:50 PM EST     I have seen and evaluated this patient with my supervising physician No att. providers found. Triage CHIEF COMPLAINT       Chief Complaint   Patient presents with    Emesis     For 4 days    Abdominal Pain       HISTORY OF PRESENT ILLNESS   (Location/Symptom, Timing/Onset, Context/Setting, Quality, Duration, Modifying Factors, Severity)  Note limiting factors. Chief Complaint: Abdominal pain, nausea vomiting diarrhea    Stefanie Camargo is a 43 y.o. male comes to the emergency department with diffuse abd pain associated nausea vomiting and diarrhea. Onset was 4 days ago. His abdominal pain is rated 10/10. He has no relieving factors. Has not taken anything prior to coming to the emergency department. However he reports 10/10 abdominal pain     Notes were all reviewed and agreed with or any disagreements were addressed in the HPI. REVIEW OF SYSTEMS    (2-9 systems for level 4, 10 or more for level 5)     Review of Systems   Constitutional:  Negative for chills, fatigue and fever. HENT:  Negative for congestion and sore throat. Eyes:  Negative for visual disturbance. Respiratory:  Negative for cough, chest tightness and shortness of breath. Cardiovascular:  Negative for chest pain and leg swelling. Gastrointestinal:  Positive for abdominal pain, diarrhea, nausea and vomiting. Negative for blood in stool and constipation. Genitourinary:  Negative for difficulty urinating. Musculoskeletal:  Negative for arthralgias and myalgias. Skin:  Negative for color change and rash. Neurological:  Negative for dizziness, weakness and headaches.    Psychiatric/Behavioral:  Negative for confusion.       PAST MEDICAL HISTORY     Past Medical History:   Diagnosis Date    Abscess of left foot 4/22/2022    Anemia associated with acute blood loss 4/26/2022    Callus of foot     Right foot - see's Dr. Amanda Martines    Cellulitis of left foot 4/22/2022    Diabetic ulcer of left midfoot associated with type 2 diabetes mellitus, with muscle involvement without evidence of necrosis ([address] 4/26/2022    Diabetic ulcer of left midfoot associated with type 2 diabetes mellitus, with necrosis of muscle ([address] 6/7/2021    Diabetic ulcer of right midfoot associated with type 2 diabetes mellitus, with fat layer exposed ([address] 8/11/2020    Dizziness     positional    Essential hypertension     Follows with PCP    Hyperlipidemia     Lumbar radiculopathy     Septic embolism ([address] 6/13/2020    Subacute osteomyelitis of left foot ([address] 4/22/2022       SURGICAL HISTORY     Past Surgical History:   Procedure Laterality Date    ACHILLES TENDON SURGERY Right 1/8/2021    RIGHT ACHILLES TENDON LENGTHENING REPAIR performed by Heladio Rubalacva DPM at [place] [address]  03/2018    [facility]    DENTAL SURGERY      teeth extractions -half per patient    HERNIA REPAIR Bilateral 5/27/2020    BIALTERAL HERNIA INGUINAL REPAIR performed by Dano Erwin MD at [address] Right 9/2/2022    LEG AMPUTATION BELOW KNEE performed by Sanjana Garza MD at [address] Right 9/5/2022    LEG AMPUTATION BELOW KNEE REVISION performed by Sanjana Garza MD at [facility] 30 2018    DE OFFICE/OUTPT VISIT,PROCEDURE ONLY Left 4/17/2018    L5-S1 HEMILAMINECTOMY, REMOVAL OF DISC LEFT SIDE performed by Minnie Sanders MD at [address] Right 1/8/2021    RIGHT TRANSMETATARSAL TOE AMPUTATION performed by Heladio Rubalcava DPM at [address] Left 4/23/2022    LEFT RAY GREAT TOE AMPUTATION performed by Talisha Krueger MD at [facility] OR    WISDOM TOOTH EXTRACTION         CURRENTMEDICATIONS       Discharge Medication List as of 11/10/2022  8:16 PM        CONTINUE these medications which have NOT CHANGED    Details   ! ! pantoprazole (PROTONIX) 40 MG tablet Historical Med      carvedilol (COREG) 12.5 MG tablet Take 1 tablet by mouth 2 times daily (with meals), Disp-60 tablet, R-0Normal      losartan (COZAAR) 100 MG tablet Take 1 tablet by mouth daily, Disp-30 tablet, R-0Normal      clopidogrel (PLAVIX) 75 MG tablet Take 1 tablet by mouth daily, Disp-30 tablet, R-0Normal      docusate sodium (COLACE, DULCOLAX) 100 MG CAPS Take 100 mg by mouth dailyOTC      aspirin 81 MG chewable tablet Take 81 mg by mouth dailyHistorical Med      !! pantoprazole (PROTONIX) 20 MG tablet Take 1 tablet by mouth every morning (before breakfast), Disp-30 tablet, R-0Normal      sucralfate (CARAFATE) 1 GM/10ML suspension Take 10 mLs by mouth 4 times daily for 7 days, Disp-414 mL, R-0Normal      glyBURIDE (DIABETA) 5 MG tablet Take 2 tablets by mouth in the morning and at bedtime, Disp-120 tablet, R-1Normal      Dulaglutide 1.5 MG/0.5ML SOPN Inject 1.5 mg into the skin once a week, Disp-5 pen, R-3Normal      FLUoxetine (PROZAC) 40 MG capsule Take 2 capsules by mouth in the morning., Disp-30 capsule, R-3Pt takes 40 mg & 20 mg for total 60 mg qdNormal      insulin glargine (LANTUS SOLOSTAR) 100 UNIT/ML injection pen Inject 30 Units into the skin nightly, Disp-5 pen, R-2Cancel qty 3Normal      Alcohol Swabs PADS Starting Wed 4/27/2022, Historical Med      atorvastatin (LIPITOR) 40 MG tablet Take 1 tablet by mouth nightly, Disp-90 tablet, R-1Normal      blood glucose monitor strips Test 2 times a day & as needed for symptoms of irregular blood glucose. Dispense sufficient amount for indicated testing frequency plus additional to accommodate PRN testing needs. , Disp-100 strip, R-0, Normal      blood glucose monitor kit and supplies Dispense sufficient amount for indicated Low Risk     Unable to Pay for Housing in the Last Year: No    Number of Places Lived in the Last Year: 1    Unstable Housing in the Last Year: No       SCREENINGS    Peridot Coma Scale  Eye Opening: Spontaneous  Best Verbal Response: Oriented  Best Motor Response: Obeys commands  Mikey Coma Scale Score: 15        PHYSICAL EXAM    (up to 7 for level 4, 8 or more for level 5)     ED Triage Vitals [11/10/22 1353]   BP Temp Temp Source Heart Rate Resp SpO2 Height Weight   (!) 154/104 98.7 °F (37.1 °C) Oral (!) 104 15 99 % 6' (1.829 m) 158 lb (71.7 kg)       Physical Exam  Vitals and nursing note reviewed. Constitutional:       Appearance: Normal appearance. HENT:      Head: Normocephalic and atraumatic. Nose: Nose normal.      Mouth/Throat:      Mouth: Mucous membranes are moist.   Eyes:      Conjunctiva/sclera: Conjunctivae normal.   Cardiovascular:      Rate and Rhythm: Normal rate. Pulmonary:      Effort: Pulmonary effort is normal. No respiratory distress. Breath sounds: Normal breath sounds. No wheezing. Chest:      Chest wall: No tenderness. Abdominal:      General: Abdomen is flat. Bowel sounds are normal. There is no distension or abdominal bruit. There are no signs of injury. Palpations: Abdomen is soft. Tenderness: There is generalized abdominal tenderness. There is no right CVA tenderness, left CVA tenderness or rebound. Negative signs include Elizabeth's sign, Rovsing's sign, McBurney's sign, psoas sign and obturator sign. Comments: No Madan sign noted around the umbilicus. Negative Grey Turners sign lateral abd. Musculoskeletal:         General: No swelling or tenderness. Cervical back: Normal range of motion and neck supple. Right lower leg: No edema. Left lower leg: No edema. Comments: Left BKA wound VAC in place. Skin:     General: Skin is warm and dry. Neurological:      General: No focal deficit present.       Mental Status: He is alert and oriented to person, place, and time. Psychiatric:         Mood and Affect: Mood normal.         Behavior: Behavior normal.       DIAGNOSTIC RESULTS   LABS:    Labs Reviewed   CBC WITH AUTO DIFFERENTIAL - Abnormal; Notable for the following components:       Result Value    WBC 11.5 (*)     RDW 17.1 (*)     Platelets 440 (*)     Segs Relative 74.5 (*)     Lymphocytes % 16.7 (*)     Monocytes % 7.8 (*)     All other components within normal limits   COMPREHENSIVE METABOLIC PANEL W/ REFLEX TO MG FOR LOW K - Abnormal; Notable for the following components:    Chloride 98 (*)     BUN 5 (*)     Glucose 143 (*)     Total Protein 9.1 (*)     All other components within normal limits   LIPASE   LACTIC ACID       When ordered, only abnormal lab results are displayed. All other labs were within normal range or not returned as of this dictation. EKG: When ordered, EKG's are interpreted by the Emergency Department Physician in the absence of a cardiologist.  Please see their note for interpretation of EKG. RADIOLOGY:   Non-plain film images such as CT, Ultrasound and MRI are read by the radiologist. Plain radiographic images are visualized andpreliminarily interpreted by the  ED Provider with the below findings:    Per Radiologist interpretation below, if available at the time of this note:    CT ABDOMEN PELVIS W IV CONTRAST Additional Contrast? None   Final Result   1. No CT evidence of an acute intra-abdominal or intrapelvic process. 2. Nonobstructing calculi left kidney. 3.  No findings to suggest acute appendicitis; no ureter calculus or   hydronephrosis. 4. No finding identified to suggest etiology for the patient's description of   10/10 abdominal pain.              PROCEDURES   Unless otherwise noted below, none     Procedures    CRITICAL CARE TIME     na    CONSULTS:  IP CONSULT TO CASE MANAGEMENT    EMERGENCY DEPARTMENT COURSE and DIFFERENTIAL DIAGNOSIS/MDM:   Vitals:    Vitals:    11/10/22 1700 11/10/22 1900 11/10/22 1909 11/10/22 2004   BP: 97/79  (!) 146/103 (!) 157/105   Pulse: 97 88  85   Resp: 15      Temp:       TempSrc:       SpO2:   98% 99%   Weight:       Height:           Is this patient to be included in the SEP-1 Core Measure due to severe sepsis or septic shock? No   Exclusion criteria - the patient is NOT to be included for SEP-1 Core Measure due to:  2+ SIRS criteria are not met     This is an alert and oriented x4, 43 y.o. yo male who presents emergency department with diffuse abd pain. Patient has easy nonlabored respirations. Vital signs within normal parameters. Patient is afebrile. PERRL. Skin warm pink and dry. ABDOMEN: Soft, moderate tenderness in the entire abdomen area. is not distended. Negative bowel sounds. Negative Elizabeth's Sign. Negative McBurney's. Negative peritoneal signs. Negative palpable pulsatile masses. Additional assessment noted above. Labs stable. Patient has a very mild elevated white count of 11.5. Lactic acid within normal parameters. Lipase within normal parameters. CT of the abdomen pelvis feels her nonobstructing left kidney calculi. No acute abdominal or intrapelvic process notified per radiologist recommendation Case management has been consulted as the patient is requesting to be placed in a nursing home. Case management has offered patient the option to be evaluated by pt/ot for skilled care placement which would require hospitalization for placement. Pt declines this option. He states he has home health coming into his home. I have discussed with the patient my clinical impression and the result of an evidence-based clinical evaluation to screen for an acute abdominal emergency, as well as the risk of further testing and hospitalization. The evidence shows that the risk for an acute condition related to todays symptoms and signs is extremely low.  Although the risk of progression or new symptoms cannot be excluded, the risks of further testing or hospitalization likely exceed the benefit, and the patient declines further emergent evaluation or hospitalization for evaluation of the abdominal pain. Patient denies any further questions or concern and is discharge in stable condition with review of strict ED return guidelines. FINAL IMPRESSION      1. Nausea and vomiting, unspecified vomiting type    2. Generalized abdominal pain          DISPOSITION/PLAN   DISPOSITION Decision To Discharge 11/10/2022 08:10:01 PM      PATIENT REFERREDTO:  Elmo Cooks, APRN - CNP  [address]  [phone]        DISCHARGE MEDICATIONS:  Discharge Medication List as of 11/10/2022  8:16 PM          DISCONTINUED MEDICATIONS:  Discharge Medication List as of 11/10/2022  8:16 PM        STOP taking these medications       metFORMIN (GLUCOPHAGE) 500 MG tablet Comments:   Reason for Stopping:         metoclopramide (REGLAN) 10 MG tablet Comments:   Reason for Stopping:         omeprazole (PRILOSEC) 20 MG delayed release capsule Comments:   Reason for Stopping:         pioglitazone (ACTOS) 30 MG tablet Comments:   Reason for Stopping:         acetaminophen (TYLENOL) 325 MG tablet Comments:   Reason for Stopping:             Comment: Please note this report has been produced using speech recognition software and may contain errors related to that system including errors in grammar, punctuation, and spelling, as well as words and phrases that may be inappropriate. If there are any questions or concerns please feel free to contact the dictating provider for clarification.     RAFAEL Gonzalez CNP (electronically signed)      RAFAEL Gonzalez CNP  11/12/22 4573

## 2022-11-10 NOTE — TELEPHONE ENCOUNTER
Patricia Terrazas with [facility] saw the pt today, pt not taking meds for 6 days, not ate per pt in 5 to 6 days d/t severe nausea & vomiting, bp 160/100 p 102 100% temp 97.8 blood sugar 176. Pt refused to go to the hospital. Pt looked pale, bad, was sitting with a bucket. Pt appeared to be out of it.

## 2022-11-11 ENCOUNTER — OFFICE VISIT (OUTPATIENT)
Dept: FAMILY MEDICINE CLINIC | Age: 42
End: 2022-11-11
Payer: MEDICARE

## 2022-11-11 ENCOUNTER — CARE COORDINATION (OUTPATIENT)
Dept: CARE COORDINATION | Age: 42
End: 2022-11-11

## 2022-11-11 VITALS
SYSTOLIC BLOOD PRESSURE: 142 MMHG | DIASTOLIC BLOOD PRESSURE: 100 MMHG | HEART RATE: 105 BPM | OXYGEN SATURATION: 99 % | TEMPERATURE: 97.4 F

## 2022-11-11 DIAGNOSIS — R09.81 NASAL CONGESTION: ICD-10-CM

## 2022-11-11 DIAGNOSIS — F41.9 ANXIETY: ICD-10-CM

## 2022-11-11 DIAGNOSIS — I10 ESSENTIAL HYPERTENSION: ICD-10-CM

## 2022-11-11 DIAGNOSIS — R11.2 INTRACTABLE VOMITING WITH NAUSEA: Primary | ICD-10-CM

## 2022-11-11 PROCEDURE — G8484 FLU IMMUNIZE NO ADMIN: HCPCS

## 2022-11-11 PROCEDURE — 3078F DIAST BP <80 MM HG: CPT

## 2022-11-11 PROCEDURE — G8420 CALC BMI NORM PARAMETERS: HCPCS

## 2022-11-11 PROCEDURE — 99214 OFFICE O/P EST MOD 30 MIN: CPT

## 2022-11-11 PROCEDURE — 1036F TOBACCO NON-USER: CPT

## 2022-11-11 PROCEDURE — 3074F SYST BP LT 130 MM HG: CPT

## 2022-11-11 PROCEDURE — G8427 DOCREV CUR MEDS BY ELIG CLIN: HCPCS

## 2022-11-11 PROCEDURE — 96372 THER/PROPH/DIAG INJ SC/IM: CPT

## 2022-11-11 RX ORDER — GUAIFENESIN 600 MG/1
600 TABLET, EXTENDED RELEASE ORAL 2 TIMES DAILY
Qty: 30 TABLET | Refills: 0 | Status: SHIPPED | OUTPATIENT
Start: 2022-11-11 | End: 2022-11-26

## 2022-11-11 RX ORDER — ONDANSETRON 4 MG/1
4 TABLET, FILM COATED ORAL DAILY PRN
Qty: 30 TABLET | Refills: 0 | Status: SHIPPED | OUTPATIENT
Start: 2022-11-11

## 2022-11-11 RX ORDER — HYDROXYZINE PAMOATE 50 MG/1
50 CAPSULE ORAL EVERY 6 HOURS PRN
COMMUNITY

## 2022-11-11 RX ORDER — PROMETHAZINE HYDROCHLORIDE 25 MG/ML
12.5 INJECTION, SOLUTION INTRAMUSCULAR; INTRAVENOUS ONCE
Status: COMPLETED | OUTPATIENT
Start: 2022-11-11 | End: 2022-11-11

## 2022-11-11 RX ORDER — PRAZOSIN HYDROCHLORIDE 1 MG/1
1 CAPSULE ORAL NIGHTLY
COMMUNITY

## 2022-11-11 RX ADMIN — PROMETHAZINE HYDROCHLORIDE 12.5 MG: 25 INJECTION, SOLUTION INTRAMUSCULAR; INTRAVENOUS at 17:00

## 2022-11-11 ASSESSMENT — ENCOUNTER SYMPTOMS
CONSTIPATION: 0
COLOR CHANGE: 0
ABDOMINAL PAIN: 1
VOMITING: 1
NAUSEA: 1
DIARRHEA: 0
SHORTNESS OF BREATH: 0
BLOOD IN STOOL: 0

## 2022-11-11 NOTE — ED PROVIDER NOTES
versus another process early in its course. I have a low suspicion for acute surgical abdomen. Stable for outpatient management with close follow up. Return to the ED for worsening symptoms. All diagnostic, treatment, and disposition decisions were made by myself in conjunction with the advanced practice provider. For all further details of the patient's emergency department visit, please see the advanced practice provider's documentation. Comment: Please note this report has been produced using speech recognition software and may contain errors related to that system including errors in grammar, punctuation, and spelling, as well as words and phrases that may be inappropriate. If there are any questions or concerns please feel free to contact the dictating provider for clarification.         Silvia Ramos MD  11/14/22 8224

## 2022-11-11 NOTE — CARE COORDINATION
YANETH - David HALL -CNP --Spoke to pt at bedside accompanied by Tomi Beltran. RN/CM , Mr Meng Green was calm, alert / oriented x 3 , admitted he has multiple resources he uses --Wound Care 2 x week,[facility] Week, a friend for transportation , lives very close to wound care , grocery , and has a  for his care including a contact for the [employer]. He appears to be compliant with meds / care . He does live alone . CM inquired as to his intention as he has no acute findings in ER , is young (43) and is able to provide care for himself. He stated he would consider going into a Nursing Home -------but then expressed satisfaction for being at home . Mr Meng Green  stated he had a couple(Orin and -?) of  was actively working with him on Both SNF ([facility]?)  and and for the [facility] giving him extended hours of home care with home aids--and that he was and will be in touch with them (even tomorrow). Informed him that he is eligable  for  rehabilitation -at a SNF but it would be determined later if he would be eligable for LTC considering his age and ability for self care. There was some previous questions on his level of care living in [facility]. Tex Carbajal He stated he was feeling much better after receiving the IV medications , and now states he would like to go home , calling his friend for transportation -appearing anxious to be discharged. Mr Meng Green was not homicidal or suicidal and appeared to be planning for greater services at home thru [employer].     Beauty Birkenhead / Kayode Lynne / [facility]

## 2022-11-11 NOTE — ED NOTES
Reviewed discharge information. Patient verbalized understanding of paperwork, medication, and care instructions. Patient denied any questions.       Conrad Monson RN  11/10/22 2025

## 2022-11-11 NOTE — CARE COORDINATION
Phone call to [facility] admissions staff, Richie Arnulfo who reported they cannot accept Pt into their SNF due to his high acuity as a result of suicidal ideations. Phone call to Hu Edwards with Heather at [facility] to discuss referral.  Hu Edwards requested information is faxed to her at [phone]. KOLE provided update to [facility] plan of care:  KOLE will route note to PCP office to request documents are faxed to Hu Edwards at [address] at [facility]. KOLE will follow up with facility on 11/14.

## 2022-11-11 NOTE — PROGRESS NOTES
11/11/2022    Dorisann Shape    Chief Complaint   Patient presents with    Other     Emergency room f/u  - nausea & vomiting . Pt received injection to stop n&v. Pt states he is still having nausea & vomiting, unable to take his meds , extreme difficulty eating or drinking anything. HPI  History was obtained from patient. Andrez Fisher is a pleasant 43 y.o. male who presents today for ER follow-up. Continued nausea and vomiting, received IV Zofran in ER which helped for a couple of hours per patient. He has not been able to take his medications for several days. Blood pressure is not well controlled in office. Patient does have home health care visits that have started again. Wound care twice weekly- once in office and once at home. He has wheelchair. Needs transportation help for appointments. S/p right BKA with wound VAC and dressing in place. Left lower extremity also wrapped at this time. He reports increased mucus production, states that most of what he gets up from his stomach is mucus. Patient reports significant anxiety and depression, states that he does not want to be home alone. He is agreeable to assisted living or ECF placement and  are working toward this goal.  Was recently to mild admission to [facility] ECF related to concerns over suicidal ideation. Currently case management is looking into ECF in [facility] for placement. Patient's records were faxed to this facility earlier this day. Need BP check with [facility]      1. Intractable vomiting with nausea    2. Nasal congestion    3. Essential hypertension    4. Anxiety             REVIEW OF SYMPTOMS    Review of Systems   Constitutional:  Negative for chills, fever and unexpected weight change. Respiratory:  Negative for shortness of breath. Cardiovascular: Negative. Negative for chest pain, palpitations and leg swelling. Gastrointestinal:  Positive for abdominal pain, nausea and vomiting.  Negative for blood in stool, constipation and diarrhea. Genitourinary:  Negative for difficulty urinating. Skin:  Negative for color change. Neurological:  Negative for light-headedness. Psychiatric/Behavioral:  Positive for sleep disturbance. The patient is nervous/anxious.       PAST MEDICAL HISTORY  Past Medical History:   Diagnosis Date    Abscess of left foot 4/22/2022    Anemia associated with acute blood loss 4/26/2022    Callus of foot     Right foot - see's Dr. Milton Luna    Cellulitis of left foot 4/22/2022    Diabetic ulcer of left midfoot associated with type 2 diabetes mellitus, with muscle involvement without evidence of necrosis ([address] 4/26/2022    Diabetic ulcer of left midfoot associated with type 2 diabetes mellitus, with necrosis of muscle ([address] 6/7/2021    Diabetic ulcer of right midfoot associated with type 2 diabetes mellitus, with fat layer exposed ([address] 8/11/2020    Dizziness     positional    Essential hypertension     Follows with PCP    Hyperlipidemia     Lumbar radiculopathy     Septic embolism ([address] 6/13/2020    Subacute osteomyelitis of left foot ([address] 4/22/2022       FAMILY HISTORY  Family History   Problem Relation Age of Onset    Heart Disease Father     Diabetes Father     Diabetes Mother     Heart Disease Mother     Other Mother     Kidney Disease Mother     Heart Attack Mother        SOCIAL HISTORY  Social History     Socioeconomic History    Marital status: Single     Spouse name: None    Number of children: None    Years of education: None    Highest education level: None   Tobacco Use    Smoking status: Never    Smokeless tobacco: Never   Vaping Use    Vaping Use: Never used   Substance and Sexual Activity    Alcohol use: Yes     Comment: occasionally    Drug use: No    Sexual activity: Yes     Partners: Female     Social Determinants of Health     Financial Resource Strain: Medium Risk    Difficulty of Paying Living Expenses: Somewhat hard   Food Insecurity: Food Insecurity Present Worried About [facility] in the Last Year: Sometimes true    Margarita of Food in the Last Year: Sometimes true   Transportation Needs: No Transportation Needs    Lack of Transportation (Medical): No    Lack of Transportation (Non-Medical):  No   Physical Activity: Inactive    Days of Exercise per Week: 0 days    Minutes of Exercise per Session: 0 min   Stress: Stress Concern Present    Feeling of Stress : Rather much   Social Connections: Unknown    Frequency of Communication with Friends and Family: Once a week    Attends [employer] Services: Never    Attends Club or Organization Meetings: Never    Marital Status: Never    Housing Stability: Low Risk     Unable to Pay for Housing in the Last Year: No    Number of Places Lived in the Last Year: 1    Unstable Housing in the Last Year: No        SURGICAL HISTORY  Past Surgical History:   Procedure Laterality Date    ACHILLES TENDON SURGERY Right 1/8/2021    RIGHT [address] performed by Carmelita Josue DPM at [address] [place] [address]  03/2018    [address] [place] [address] 51    DENTAL SURGERY      teeth extractions -half per patient    HERNIA REPAIR Bilateral 5/27/2020    [address] performed by Binta Thornton MD at [address] [place] [address] Right 9/2/2022    LEG AMPUTATION BELOW KNEE performed by Sarah Harper MD at [address] [place] [address] Right 9/5/2022    LEG AMPUTATION BELOW KNEE REVISION performed by Sarah Harper MD at [place] [address]    [place] OFFICE/OUTPT VISIT,PROCEDURE ONLY Left 4/17/2018    L5-S1 HEMILAMINECTOMY, REMOVAL OF DISC LEFT SIDE performed by Juan Manuel Underwood MD at [address] Right 1/8/2021    RIGHT TRANSMETATARSAL TOE AMPUTATION [facility] Left 4/23/2022    LEFT RAY GREAT TOE AMPUTATION performed by Naomi Ramsey MD at [address] [place] [address] CURRENT MEDICATIONS  Current Outpatient Medications   Medication Sig Dispense Refill    hydrOXYzine pamoate (VISTARIL) 50 MG capsule Take 50 mg by mouth every 6 hours as needed for Anxiety      prazosin (MINIPRESS) 1 MG capsule Take 1 mg by mouth nightly      ondansetron (ZOFRAN) 4 MG tablet Take 1 tablet by mouth daily as needed for Nausea or Vomiting 30 tablet 0    guaiFENesin (MUCINEX) 600 MG extended release tablet Take 1 tablet by mouth 2 times daily for 15 days 30 tablet 0    Alcohol Swabs PADS       pantoprazole (PROTONIX) 40 MG tablet       carvedilol (COREG) 12.5 MG tablet Take 1 tablet by mouth 2 times daily (with meals) (Patient not taking: Reported on 11/11/2022) 60 tablet 0    losartan (COZAAR) 100 MG tablet Take 1 tablet by mouth daily (Patient not taking: Reported on 11/11/2022) 30 tablet 0    clopidogrel (PLAVIX) 75 MG tablet Take 1 tablet by mouth daily (Patient not taking: Reported on 11/11/2022) 30 tablet 0    docusate sodium (COLACE, DULCOLAX) 100 MG CAPS Take 100 mg by mouth daily (Patient not taking: Reported on 11/11/2022)      aspirin 81 MG chewable tablet Take 81 mg by mouth daily (Patient not taking: Reported on 11/11/2022)      pantoprazole (PROTONIX) 20 MG tablet Take 1 tablet by mouth every morning (before breakfast) 30 tablet 0    sucralfate (CARAFATE) 1 GM/10ML suspension Take 10 mLs by mouth 4 times daily for 7 days (Patient not taking: Reported on 11/11/2022) 414 mL 0    glyBURIDE (DIABETA) 5 MG tablet Take 2 tablets by mouth in the morning and at bedtime (Patient not taking: Reported on 11/11/2022) 120 tablet 1    Dulaglutide 1.5 MG/0.5ML SOPN Inject 1.5 mg into the skin once a week (Patient not taking: Reported on 11/11/2022) 5 pen 3    FLUoxetine (PROZAC) 40 MG capsule Take 2 capsules by mouth in the morning.  (Patient not taking: Reported on 11/11/2022) 30 capsule 3    insulin glargine (LANTUS SOLOSTAR) 100 UNIT/ML injection pen Inject 30 Units into the skin nightly (Patient not taking: Reported on 11/11/2022) 5 pen 2    atorvastatin (LIPITOR) 40 MG tablet Take 1 tablet by mouth nightly (Patient not taking: Reported on 11/11/2022) 90 tablet 1    blood glucose monitor strips Test 2 times a day & as needed for symptoms of irregular blood glucose. Dispense sufficient amount for indicated testing frequency plus additional to accommodate PRN testing needs. (Patient not taking: Reported on 11/11/2022) 100 strip 0    blood glucose monitor kit and supplies Dispense sufficient amount for indicated testing frequency plus additional to accommodate PRN testing needs. Dispense all needed supplies to include: monitor, strips, lancing device, lancets, control solutions, alcohol swabs. (Patient not taking: Reported on 11/11/2022) 1 kit 0    FreeStyle Lancets MISC 1 each by Does not apply route daily (Patient not taking: Reported on 11/11/2022) 100 each 3     No current facility-administered medications for this visit. ALLERGIES  No Known Allergies    PHYSICAL EXAM    BP (!) 142/100   Pulse (!) 105   Temp 97.4 °F (36.3 °C)   SpO2 99%     Physical Exam  Vitals and nursing note reviewed. Constitutional:       Appearance: He is well-developed. HENT:      Head: Normocephalic and atraumatic. Eyes:      General: No scleral icterus. Conjunctiva/sclera: Conjunctivae normal.   Neck:      Thyroid: No thyromegaly. Vascular: No JVD. Trachea: No tracheal deviation. Cardiovascular:      Rate and Rhythm: Normal rate and regular rhythm. Heart sounds: Normal heart sounds. Pulmonary:      Effort: Pulmonary effort is normal. No respiratory distress. Breath sounds: Normal breath sounds. No wheezing or rales. Abdominal:      General: There is no distension. Palpations: Abdomen is soft. Tenderness: There is no abdominal tenderness. There is no guarding or rebound. Musculoskeletal:      Cervical back: Neck supple.       Right Lower Extremity: Right leg is amputated below knee. Lymphadenopathy:      Cervical: No cervical adenopathy. Skin:     General: Skin is warm and dry. Nails: There is no clubbing. Neurological:      Mental Status: He is alert and oriented to person, place, and time. Comments: Nonfocal   Psychiatric:         Mood and Affect: Mood is anxious and depressed. Affect is tearful. Speech: Speech normal.         Behavior: Behavior is cooperative. Thought Content: Thought content does not include suicidal ideation. Thought content does not include suicidal plan. Judgment: Judgment normal.       ASSESSMENT & PLAN    Manda Fragoso was seen today for other. Diagnoses and all orders for this visit:    Intractable vomiting with nausea  Will request [facility] from [facility] gastroenterology from last visit. Phenergan injection in office for acute nausea and vomiting. Will provide Zofran for home use. Denies hematemesis, coffee-ground emesis. -     ondansetron (ZOFRAN) 4 MG tablet; Take 1 tablet by mouth daily as needed for Nausea or Vomiting  -     promethazine (PHENERGAN) injection 12.5 mg    Nasal congestion  Increased mucus production. Educated patient to take Zofran initially, follow with Mucinex.  -     guaiFENesin (MUCINEX) 600 MG extended release tablet; Take 1 tablet by mouth 2 times daily for 15 days    Essential hypertension  Patient concerned about blood pressure. Educated patient to take Zofran initially, follow this with blood pressure medications. Patient voiced understanding. Will continue to partner with case management for ECF/AL placement. Patient is agreeable does not want to be at home alone. Return in about 4 weeks (around 12/9/2022).          Electronically signed by RAFAEL Guadarrama CNP on 11/11/2022

## 2022-11-11 NOTE — CARE COORDINATION
Spoke with CC  Helen in regard to pt care in regard to possible SNF placement. Home nursing care in place for time being. Call to [facility], report start of care was yesterday. Pt to have therapy on MOnday. Sarah Wiggins will inform nurse manager that pt was discharged back home from ER. Will continue to follow.

## 2022-11-15 ENCOUNTER — HOSPITAL ENCOUNTER (OUTPATIENT)
Dept: WOUND CARE | Age: 42
Discharge: HOME | End: 2022-11-15
Payer: MEDICARE

## 2022-11-15 ENCOUNTER — CARE COORDINATION (OUTPATIENT)
Dept: CARE COORDINATION | Age: 42
End: 2022-11-15

## 2022-11-15 VITALS
HEART RATE: 90 BPM | TEMPERATURE: 99 F | RESPIRATION RATE: 18 BRPM | SYSTOLIC BLOOD PRESSURE: 144 MMHG | DIASTOLIC BLOOD PRESSURE: 82 MMHG

## 2022-11-15 DIAGNOSIS — E11.621 DIABETIC ULCER OF TOE OF LEFT FOOT ASSOCIATED WITH TYPE 2 DIABETES MELLITUS, WITH FAT LAYER EXPOSED (HCC): ICD-10-CM

## 2022-11-15 DIAGNOSIS — E11.621 DIABETIC ULCER OF LEFT MIDFOOT ASSOCIATED WITH TYPE 2 DIABETES MELLITUS, WITH FAT LAYER EXPOSED (HCC): Primary | ICD-10-CM

## 2022-11-15 DIAGNOSIS — L97.422 DIABETIC ULCER OF LEFT MIDFOOT ASSOCIATED WITH TYPE 2 DIABETES MELLITUS, WITH FAT LAYER EXPOSED (HCC): Primary | ICD-10-CM

## 2022-11-15 DIAGNOSIS — L97.522 DIABETIC ULCER OF TOE OF LEFT FOOT ASSOCIATED WITH TYPE 2 DIABETES MELLITUS, WITH FAT LAYER EXPOSED (HCC): ICD-10-CM

## 2022-11-15 DIAGNOSIS — Z89.432 PARTIAL NONTRAUMATIC AMPUTATION OF FOOT, LEFT (HCC): ICD-10-CM

## 2022-11-15 DIAGNOSIS — L03.116 CELLULITIS OF LEFT FOOT: ICD-10-CM

## 2022-11-15 PROCEDURE — 11042 DBRDMT SUBQ TIS 1ST 20SQCM/<: CPT

## 2022-11-15 PROCEDURE — 97605 NEG PRS WND THER DME<=50SQCM: CPT

## 2022-11-15 PROCEDURE — 11042 DBRDMT SUBQ TIS 1ST 20SQCM/<: CPT | Performed by: NURSE PRACTITIONER

## 2022-11-15 RX ORDER — BACITRACIN, NEOMYCIN, POLYMYXIN B 400; 3.5; 5 [USP'U]/G; MG/G; [USP'U]/G
OINTMENT TOPICAL ONCE
Status: CANCELLED | OUTPATIENT
Start: 2022-11-15 | End: 2022-11-15

## 2022-11-15 RX ORDER — SULFAMETHOXAZOLE AND TRIMETHOPRIM 800; 160 MG/1; MG/1
1 TABLET ORAL 2 TIMES DAILY
Qty: 28 TABLET | Refills: 0 | Status: SHIPPED | OUTPATIENT
Start: 2022-11-15 | End: 2022-11-29

## 2022-11-15 RX ORDER — LIDOCAINE HYDROCHLORIDE 20 MG/ML
JELLY TOPICAL ONCE
Status: CANCELLED | OUTPATIENT
Start: 2022-11-15 | End: 2022-11-15

## 2022-11-15 RX ORDER — GINSENG 100 MG
CAPSULE ORAL ONCE
Status: CANCELLED | OUTPATIENT
Start: 2022-11-15 | End: 2022-11-15

## 2022-11-15 RX ORDER — LIDOCAINE HYDROCHLORIDE 40 MG/ML
SOLUTION TOPICAL ONCE
Status: CANCELLED | OUTPATIENT
Start: 2022-11-15 | End: 2022-11-15

## 2022-11-15 RX ORDER — LIDOCAINE 50 MG/G
OINTMENT TOPICAL ONCE
Status: CANCELLED | OUTPATIENT
Start: 2022-11-15 | End: 2022-11-15

## 2022-11-15 RX ORDER — CLOBETASOL PROPIONATE 0.5 MG/G
OINTMENT TOPICAL ONCE
Status: CANCELLED | OUTPATIENT
Start: 2022-11-15 | End: 2022-11-15

## 2022-11-15 RX ORDER — BETAMETHASONE DIPROPIONATE 0.05 %
OINTMENT (GRAM) TOPICAL ONCE
Status: CANCELLED | OUTPATIENT
Start: 2022-11-15 | End: 2022-11-15

## 2022-11-15 RX ORDER — GENTAMICIN SULFATE 1 MG/G
OINTMENT TOPICAL ONCE
Status: CANCELLED | OUTPATIENT
Start: 2022-11-15 | End: 2022-11-15

## 2022-11-15 RX ORDER — LIDOCAINE 40 MG/G
CREAM TOPICAL ONCE
Status: CANCELLED | OUTPATIENT
Start: 2022-11-15 | End: 2022-11-15

## 2022-11-15 RX ORDER — BACITRACIN ZINC AND POLYMYXIN B SULFATE 500; 1000 [USP'U]/G; [USP'U]/G
OINTMENT TOPICAL ONCE
Status: CANCELLED | OUTPATIENT
Start: 2022-11-15 | End: 2022-11-15

## 2022-11-15 ASSESSMENT — PAIN SCALES - WONG BAKER: WONGBAKER_NUMERICALRESPONSE: 0

## 2022-11-15 ASSESSMENT — PAIN SCALES - GENERAL: PAINLEVEL_OUTOF10: 0

## 2022-11-15 NOTE — CARE COORDINATION
Phone call to [place] with Heather at [facility] who reported she never received referral for Pt. KOLE discussed plans to request PCP office re-sends information. Confirmed fax number of [phone]. KOLE plan of care:  KOLE will follow up with [place] on 11/16 to confirm she received fax.

## 2022-11-15 NOTE — DISCHARGE INSTRUCTIONS
AND BLACK FOAM TO WOUND BED. SECURE VAC DRESSING WITH DRAPE. SET WOUND VAC  CONTINUOUS SUCTION. CANISTER CHANGE WEEKLY OR ACCORDING TO VOLUME OF DRAINAGE. Wrap with ACE wrap, but do not compress tubing against skin. WOUND CARE CENTER WILL CHANGE ON FRIDAYS (IF VAC SUPPLIES SENT - NEED CANISTER AND BLACK FOAM DRESSING KIT). Nurse Visit Friday   Follow Up Instructions: At the [address] [place] [address] Road in 1 week: Tuesday   Primary Wound Care Provider: Karthik Dykes CNP   Call  for any questions or concerns.   Central Scheduling: [phone]

## 2022-11-15 NOTE — PROGRESS NOTES
Wound Care Center Progress Note With Procedure    Darin Gonzales  AGE: 43 y.o. GENDER: male  : 1980  EPISODE DATE:  11/15/2022     Subjective:     Chief Complaint   Patient presents with    Wound Check     Bilateral legs          HISTORY of PRESENT ILLNESS      Darin Gonzales is a 43 y.o. male who presents today for wound evaluation of Chronic diabetic, pressure and non-healing surgical ulcer(s) of the left medial foot and lateral plantar surface. The ulcer is of marked severity. The underlying cause of the wound is diabetes, non-healing surgical. He presents today with a new wound to the right plantar foot that is diabetic and pressure in nature and of moderate severity. The patient has significant underlying medical conditions as below. The patient is having significant depression related to the recent and sudden death of his mother. 10/20/22: The patient was here last 10/4/22 and his right BKA site was well approximated, staples removed and steri strips. The prosthetic supplier was with him and plans had started to get fitted. Unfortunately, the patient fell the same night at home and the wound dehisced. He was evaluated at the ED. The patient then was not seen at the wound clinic for a few weeks as he was hospitalized for suicidal ideation. Today able to probe and visibly see bone. Home Health still in place, will apply for a wound vac. Will also start Bactrim. 22: Since his last visit to the wound clinic 22 the patient was hospitalized -22) at [facility] with necrotizing fascitis right lower leg with resulting right BKA. 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 Shanon Vazquez. She placed a wound VAC to on the stump at that time. On 2022 Dr. Josehp Joyce performed a primary closure of his right BKA stump.     Wound Pain Timing/Severity: none  Quality of pain: N/A  Severity of pain:  0 / 10   Modifying Factors: diabetes and chronic pressure  Associated Signs/Symptoms: drainage     Diabetes: Yes, on an oral and insulin regimen, last A1c 10.5 as of 7/28/22  Diabetes education provided today:    Diabetes pathoetiology, difference between type 1 and type 2 diabetes, and progressive nature of Type 2 DM. Diabetic Neuropathy: signs and therapy. Foot care: advised to wash feet daily, pat dry and apply lotion at night, avoiding between toes. Need to look at feet daily and report to a physician any signs of inflammation or skin damage. Discussed diabetes shoes and socks. Diabetic management related to wound care    Smoking: Never smoker  Obesity:No  Anticoagulant therapy: No  Immunosuppression: No    Patient educated on the 6 essential components necessary for wound healing: Circulation, Debridements, Proper Dressings and Topical Wound Products, Infection Control, Edema Control and Offloading. Patient educated on those factors that negatively effect or impact wound healing: smoking, obesity, uncontrolled diabetes, anticoagulant and immunosuppressive regimens, inadequate nutrition, untreated arterial and venous disease if applicable and measures to manage edema. Nutritional status: well nourished. Discussed need for increased protein and calories for wound healing and good sources of protein (just over 7 grams for every 20 pounds of body weight). Animal-based foods high in protein (meat, poultry, fish, eggs, and dairy foods). Plant based foods high in protein (tofu, lentils, beans, chickpeas, nuts, quinoa and den seeds. Off Loading  Offloading or minimizing or removing weight placed on an area with poor circulation such as diabetic wounds or pressure.  This can be achieved with crutches, wheel chair, knee walker etc. Minimizing pressure through partial weight bearing (minimizing the amount of  pressure applied and or the amount of time on the area of pressure) or maintaining a non-weight bearing status can be used to promote and often can be essential for thee wound to heal. Off loading may also need to be achieved for non-weight bearing wounds such as pressure ulcers to the torso. Turning and changing positions frequently, at least every two hours. Use of pressure cushion if sitting up in chair. Skin Care  Keep skin clean and well moisturized , moisturize routinely with ointments for heavier moisturizer needs for extremely dry skin or cracks such as A&D ointment and lotions for a light moisturizer such as CeraVe or Eucerin. If incontinent change incontinence garments as soon as soiled and keeping skin clean and use barrier cream to protect the skin.         PAST MEDICAL HISTORY        Diagnosis Date    Abscess of left foot 4/22/2022    Anemia associated with acute blood loss 4/26/2022    Callus of foot     Right foot - see's Dr. Kath Mane    Cellulitis of left foot 4/22/2022    Diabetic ulcer of left midfoot associated with type 2 diabetes mellitus, with muscle involvement without evidence of necrosis ([address] 4/26/2022    Diabetic ulcer of left midfoot associated with type 2 diabetes mellitus, with necrosis of muscle ([address] 6/7/2021    Diabetic ulcer of right midfoot associated with type 2 diabetes mellitus, with fat layer exposed ([address] 8/11/2020    Dizziness     positional    Essential hypertension     Follows with PCP    Hyperlipidemia     Lumbar radiculopathy     Septic embolism ([address] 6/13/2020    Subacute osteomyelitis of left foot ([address] 4/22/2022       PAST SURGICAL HISTORY    Past Surgical History:   Procedure Laterality Date    ACHILLES TENDON SURGERY Right 1/8/2021    RIGHT ACHILLES TENDON LENGTHENING REPAIR performed by Juan Raphael DPM at [facility]  03/2018 [facility]    DENTAL SURGERY      teeth extractions -half per patient    HERNIA REPAIR Bilateral 5/27/2020    BIALTERAL HERNIA INGUINAL REPAIR performed by Keeley Solorio MD at [address] [facility] Right 9/2/2022    LEG AMPUTATION BELOW KNEE performed by Libby Chaudhry MD at [address] Right 9/5/2022    LEG AMPUTATION BELOW KNEE REVISION performed by Libby Chaudhry MD at [facility] 2018    [place] OFFICE/OUTPT VISIT,PROCEDURE ONLY Left 4/17/2018    L5-S1 HEMILAMINECTOMY, REMOVAL OF [address] LEFT SIDE performed by Bruno Yanez MD at [address] Right 1/8/2021    RIGHT TRANSMETATARSAL TOE AMPUTATION performed by Ольга Carcamo DPM at [facility] OR    TOE AMPUTATION Left 4/23/2022    LEFT RAY GREAT TOE AMPUTATION [facility]    Family History   Problem Relation Age of Onset    Heart Disease Father     Diabetes Father     Diabetes Mother     Heart Disease Mother     Other Mother     Kidney Disease Mother     Heart Attack Mother        SOCIAL HISTORY    Social History     Tobacco Use    Smoking status: Never    Smokeless tobacco: Never   Vaping Use    Vaping Use: Never used   Substance Use Topics    Alcohol use: Yes     Comment: occasionally    Drug use: No       ALLERGIES    No Known Allergies    MEDICATIONS    Current Outpatient Medications on File Prior to Encounter   Medication Sig Dispense Refill    hydrOXYzine pamoate (VISTARIL) 50 MG capsule Take 50 mg by mouth every 6 hours as needed for Anxiety      prazosin (MINIPRESS) 1 MG capsule Take 1 mg by mouth nightly      ondansetron (ZOFRAN) 4 MG tablet Take 1 tablet by mouth daily as needed for Nausea or Vomiting 30 tablet 0    guaiFENesin (MUCINEX) 600 MG extended release tablet Take 1 tablet by mouth 2 times daily for 15 days 30 tablet 0    pantoprazole (PROTONIX) 40 MG tablet       carvedilol (COREG) 12.5 MG tablet Take 1 tablet by mouth 2 times daily (with meals) (Patient not taking: Reported on 11/11/2022) 60 tablet 0    losartan (COZAAR) 100 MG tablet Take 1 tablet by mouth daily (Patient not taking: Reported on 11/11/2022) 30 tablet 0    clopidogrel (PLAVIX) 75 MG tablet Take 1 tablet by mouth daily (Patient not taking: Reported on 11/11/2022) 30 tablet 0    docusate sodium (COLACE, DULCOLAX) 100 MG CAPS Take 100 mg by mouth daily (Patient not taking: Reported on 11/11/2022)      aspirin 81 MG chewable tablet Take 81 mg by mouth daily (Patient not taking: Reported on 11/11/2022)      pantoprazole (PROTONIX) 20 MG tablet Take 1 tablet by mouth every morning (before breakfast) 30 tablet 0    [DISCONTINUED] metFORMIN (GLUCOPHAGE) 500 MG tablet Take 2 tablets by mouth 2 times daily (with meals) Indications: Start tomorrow 03/14 360 tablet 1    [DISCONTINUED] metoclopramide (REGLAN) 10 MG tablet Take 1 tablet by mouth 4 times daily (before meals and nightly) 120 tablet 3    sucralfate (CARAFATE) 1 GM/10ML suspension Take 10 mLs by mouth 4 times daily for 7 days (Patient not taking: Reported on 11/11/2022) 414 mL 0    glyBURIDE (DIABETA) 5 MG tablet Take 2 tablets by mouth in the morning and at bedtime (Patient not taking: Reported on 11/11/2022) 120 tablet 1    Dulaglutide 1.5 MG/0.5ML SOPN Inject 1.5 mg into the skin once a week (Patient not taking: Reported on 11/11/2022) 5 pen 3    [DISCONTINUED] omeprazole (PRILOSEC) 20 MG delayed release capsule Take 1 capsule by mouth once daily in the morning 90 capsule 1    FLUoxetine (PROZAC) 40 MG capsule Take 2 capsules by mouth in the morning. (Patient not taking: Reported on 11/11/2022) 30 capsule 3    insulin glargine (LANTUS SOLOSTAR) 100 UNIT/ML injection pen Inject 30 Units into the skin nightly (Patient not taking: Reported on 11/11/2022) 5 pen 2    Alcohol Swabs PADS       atorvastatin (LIPITOR) 40 MG tablet Take 1 tablet by mouth nightly (Patient not taking: Reported on 11/11/2022) 90 tablet 1    blood glucose monitor strips Test 2 times a day & as needed for symptoms of irregular blood glucose. Dispense sufficient amount for indicated testing frequency plus additional to accommodate PRN testing needs.  (Patient not taking: Reported on 11/11/2022) 100 strip 0    [DISCONTINUED] pioglitazone (ACTOS) 30 MG tablet Take 1 tablet by mouth daily 90 tablet 1    blood glucose monitor kit and supplies Dispense sufficient amount for indicated testing frequency plus additional to accommodate PRN testing needs. Dispense all needed supplies to include: monitor, strips, lancing device, lancets, control solutions, alcohol swabs. (Patient not taking: Reported on 11/11/2022) 1 kit 0    FreeStyle Lancets MISC 1 each by Does not apply route daily (Patient not taking: Reported on 11/11/2022) 100 each 3    [DISCONTINUED] acetaminophen (TYLENOL) 325 MG tablet Take 2 tablets by mouth every 4 hours as needed for Pain or Fever 120 tablet 3     No current facility-administered medications on file prior to encounter. REVIEW OF SYSTEMS    Pertinent items are noted in HPI. Constitutional: Negative for systemic symptoms including fever, chills and malaise. Objective:      BP (!) 144/82   Pulse 90   Temp 99 °F (37.2 °C) (Temporal)   Resp 18     PHYSICAL EXAM      General: The patient is in no acute distress. Mental status:  Patient is appropriate, is  oriented to place and plan of care.   Dermatologic exam: Visual inspection of the periwound reveals the skin to be normal in turgor and texture  Wound exam: see wound description below in procedure note      Assessment:     Problem List Items Addressed This Visit          Endocrine    WD-Diabetic ulcer of left midfoot associated with type 2 diabetes mellitus, with fat layer exposed ([address] - Primary    Relevant Orders    Initiate Outpatient Wound Care Protocol    Diabetic ulcer of toe of left foot associated with type 2 diabetes mellitus, with fat layer exposed ([address]    Relevant Orders    Initiate Outpatient Wound Care Protocol       Other    Cellulitis of left foot    Relevant Orders    Initiate Outpatient Wound Care Protocol    WD-Partial nontraumatic amputation of foot, left ([address]    Relevant Orders    Initiate Outpatient Wound Care Protocol       Procedure Note    Indications:  Based on my examination of this patient's wound(s) today, sharp excision into necrotic subcutaneous tissue is required to promote healing and evaluate the extent of previous healing. Performed by: RAFAEL Tao CNP    Consent obtained: Yes    Time out taken:  Yes    Pain Control: Anesthetic  Anesthetic: 4% Lidocaine Liquid Topical      Debridement:Excisional Debridement    Using curette and tissue nippers the wound(s) was/were sharply debrided down through and including the removal of subcutaneous tissue. Devitalized Tissue Debrided:  slough, exudate and callus    Pre Debridement Measurements:  Are located in the Wound Documentation Flow Sheet    All active wounds listed below with today's date are evaluated  Wound(s)    debrided this date include # : 2 & 3    Post  Debridement Measurements:  Negative Pressure Wound Therapy Leg Anterior; Lower;Proximal;Right (Active)   Unit Type KCI 11/15/22 1436   Dressing Type Black Foam 11/15/22 1436   Number of pieces used 3 11/15/22 1436   Number of pieces removed 3 11/15/22 1436   Cycle Continuous 11/15/22 1436   Target Pressure (mmHg) 125 11/15/22 1436   Intensity 5 11/15/22 1436   Canister changed? Yes 11/08/22 1440   Dressing Status New dressing applied 11/15/22 1436   Dressing Changed Changed/New 11/15/22 1436   Drainage Amount Moderate 11/01/22 1526   Dressing Change Due 11/18/22 11/15/22 1436   Wound Assessment Granulation tissue;Slough 11/01/22 1526   Rosalie-wound Assessment Intact;Fragile 11/01/22 1526   Odor None 11/01/22 1526   Number of days: 21       Wound 05/11/22 #2 (onset unknown) Left Plantar (Active)   Wound Image   10/25/22 1411   Wound Etiology Diabetic 11/15/22 1358   Dressing Status New dressing applied;Clean;Dry; Intact 11/15/22 1436   Wound Cleansed Soap and water 11/15/22 1358   Offloading for Diabetic Foot Ulcers Diabetic shoes/inserts 11/15/22 1358 Wound Length (cm) 0.7 cm 11/15/22 1358   Wound Width (cm) 0.5 cm 11/15/22 1358   Wound Depth (cm) 0.3 cm 11/15/22 1358   Wound Surface Area (cm^2) 0.35 cm^2 11/15/22 1358   Change in Wound Size % (l*w) 78.13 11/15/22 1358   Wound Volume (cm^3) 0.105 cm^3 11/15/22 1358   Wound Healing % 67 11/15/22 1358   Post-Procedure Length (cm) 0.7 cm 11/15/22 1420   Post-Procedure Width (cm) 0.5 cm 11/15/22 1420   Post-Procedure Depth (cm) 0.3 cm 11/15/22 1420   Post-Procedure Surface Area (cm^2) 0.35 cm^2 11/15/22 1420   Post-Procedure Volume (cm^3) 0.105 cm^3 11/15/22 1420   Distance Tunneling (cm) 0 cm 11/15/22 1358   Tunneling Position ___ O'Clock 0 11/15/22 1358   Undermining Starts ___ O'Clock 1200 11/15/22 1358   Undermining Ends___ O'Clock 1200 11/15/22 1358   Undermining Maxium Distance (cm) 0.3 11/15/22 1358   Wound Assessment Pink/red 11/15/22 1358   Drainage Amount Moderate 11/15/22 1358   Drainage Description Serosanguinous 11/15/22 1358   Odor None 11/15/22 1358   Rosalie-wound Assessment Hyperkeratosis (callous) 11/15/22 1358   Margins Defined edges 11/15/22 1358   Wound Thickness Description not for Pressure Injury Full thickness 11/15/22 1358   Number of days: 188       Wound 10/20/22 #3 right amp site. (Active)   Wound Image   10/25/22 1411   Wound Etiology Non-Healing Surgical 11/15/22 1358   Dressing Status New dressing applied;Clean;Dry; Intact 11/15/22 1436   Wound Cleansed Soap and water 11/15/22 1358   Offloading for Diabetic Foot Ulcers Offloading not required 11/15/22 1358   Wound Length (cm) 1.4 cm 11/15/22 1358   Wound Width (cm) 6.8 cm 11/15/22 1358   Wound Depth (cm) 1.4 cm 11/15/22 1358   Wound Surface Area (cm^2) 9.52 cm^2 11/15/22 1358   Change in Wound Size % (l*w) -70 11/15/22 1358   Wound Volume (cm^3) 13.328 cm^3 11/15/22 1358   Wound Healing % -495 11/15/22 1358   Post-Procedure Length (cm) 1.4 cm 11/15/22 1420   Post-Procedure Width (cm) 6.8 cm 11/15/22 1420   Post-Procedure Depth (cm) 1.4 cm 11/15/22 1420   Post-Procedure Surface Area (cm^2) 9.52 cm^2 11/15/22 1420   Post-Procedure Volume (cm^3) 13.328 cm^3 11/15/22 1420   Distance Tunneling (cm) 1.1 cm 11/15/22 1358   Tunneling Position ___ O'Clock 1800 11/15/22 1358   Undermining Starts ___ O'Clock 0 11/15/22 1358   Undermining Ends___ O'Clock 0 11/15/22 1358   Undermining Maxium Distance (cm) 0 11/15/22 1358   Wound Assessment Pink/red;Slough 11/15/22 1358   Drainage Amount Moderate 11/15/22 1358   Drainage Description Yellow;Serosanguinous 11/15/22 1358   Odor None 11/15/22 1358   Rosalie-wound Assessment Maceration 11/15/22 1358   Margins Attached edges 11/15/22 1358   Wound Thickness Description not for Pressure Injury Full thickness 11/15/22 1358   Number of days: 26     Percent of Wound(s) Debrided: approximately 100%    Total  Area  Debrided: 9.87 sq cm     Bleeding:  Minimal    Hemostasis Achieved:  not needed    Procedural Pain:  0  / 10     Post Procedural Pain:  0 / 10     Response to treatment:  Well tolerated by patient. Status of wound progress and description from last visit: The wounds on the left foot are clean with no S&S infection. Right BKA post necrotizing fascitis, the BKA site now with dehiscence, able to probe to bone. NPWT in place. He is on Bactrim. The patient continues to orellana depression post his mother's death. Advanced Modality Checklist: Skin substitutes    [x] Yes []  No  Is the wound Greater than 1.0 sq cm  [x] Yes []  No  Has the wound had Documented Treatment for 30 days ? [] Yes [x]  No  Recurrent Wound with Skin Substitute with Last Year?  [] Yes [x]  No  Radiographic testing in the last 3 months? [] Yes [x]  No  Vascular Assessment in the last 6 months? [x] Yes []  No  Smoking Status  [x] Yes []  No  Nutrition Assessed  [x] Yes []  No  Wound Free from infection   [x] Yes []  No  Is wound free of eschar, slough, and/or Bio [place]?   [] Yes [x]  No  Malignant Process in Wound  [] Yes []  No  Hemoglobin A1C in the last 3 months?  last A1c 10.4 as of 4/26/22  [x] Yes []  No  Off loading Diabetic Foot Ulcer? [x] Yes []  No Compression Therapy of 20 mmHg or Greater? Plan:       Discharge Instructions         PHYSICIAN ORDERS AND DISCHARGE INSTRUCTIONS     NOTE: Upon discharge from the [address], you will receive a patient experience survey. We would be grateful if you would take the time to fill this survey out. Wound cleansing:              Do not scrub or use excessive force. Wash hands with soap and water before and after dressing changes. Prior to applying a clean dressing, cleanse wound with normal saline, wound cleanser, or mild soap and water. Ask the physician or nurse before getting the wound(s) wet in a shower     Daily Wound management:              Keep weight off wounds and reposition every 2 hours. Avoid standing for long periods of time. Apply wraps/stockings in AM and remove at bedtime. If swelling is present, elevate legs to the level of the heart or above for 30 minutes 4-5 times a day and/or when sitting. When taking antibiotics take entire prescription as ordered by physician do not stop taking until medicine is all gone. Wound Care Notes:  Rx: Joel Plater  Apply for grafts 05/11/22: aLL GRAFTS approved 06/08/22 for Left Toe amp site  Reapply for graft 08/22/22 for Left toes amp site   YADY's   Right 1.07      Left    1.1           Date: 08/15/22   Home Care Discontinued. Needs Nurse visit         Grafts Left Foot Amp Site  Puraply #1 Graft #1 4x4 fenestrated 06/15/22   Puraply #2 Graft #2 4x4 fenestrated 06/22/22                                    Orders for this week: 11/1/22     FAX ORDERS TO [facility]! Left Plantar Foot wound -- Clean with soap and water, pat dry. Apply anasept gel, stimulen powder and ioplex to wound beds.   Cover with

## 2022-11-15 NOTE — PROGRESS NOTES
Negative Pressure Wound Therapy    NAME:  Emelia Reyes OF BIRTH:  1980  MEDICAL RECORD NUMBER:  [id number]  DATE:  11/15/2022    Applied Negative Pressure to Rt amp site  wound(s)/ulcer(s). [x] Applied skin barrier prep to serenity-wound. [x] Cut strips of plastic drape to picture frame wound so that serenity-wound is     covered with the drape. [x] If bridging dressing to less prominent site, cover any intact skin that will come in contact with the Negative Pressure Therapy sponge, gauze or channel drain with plastic drape. The sponge should never touch intact skin. [x] Cut sponge, gauze or channel drain to size which will fit into the wound/ulcer bed without being forced. [x] Be sure the sponge is large enough to hold the entire round plastic flange which is attached to the tubing. Never allow flange to be larger than the sponge or it will produce suction damaging intact skin. Total number of individual pieces of foam used within the wound bed: 3    [x] If bridging the dressing away from the primary site, be sure the bridge leads to a piece of sponge large enough to hold the entire flange without allowing any of the flange to overlap onto intact skin. [x] Covered sponge, gauze or channel drain with plastic drape. [x] Cut a hole in this plastic drape directly over the sponge the same size as the plastic drain tubing. [x] Removed plastic liner from flange and apply it directly over the hole you cut. [x] Removed the plastic cover from the flange. [x] Attached the tubing to the wound/ulcer Negative Pressure Therapy and turn it on to be sure a vacuum is created and that there are no leaks. [x] If air leaks occur, use plastic drape to patch them. [x] Secured Negative Pressure Therapy dressing with ace wrap loosely if located on an extremity. Maintain tubing outside of ace wrap. Tubing must not exert pressure on intact skin.     Applied per  Guidelines      Electronically signed by Venice Raza RN on 11/15/2022 at 2:40 PM

## 2022-11-16 ENCOUNTER — CARE COORDINATION (OUTPATIENT)
Dept: CARE COORDINATION | Age: 42
End: 2022-11-16

## 2022-11-16 NOTE — CARE COORDINATION
Phone call to Marci Gonzalez with admissions for Heather at [facility]. Marci Gonzalez confirmed she received the referral via fax from PCP office and that the DON is reviewing the documents. KOLE did provide PCP office with update that the fax was received and DON is reviewing. KOLE plan of care:  KOLE will follow up with Heather at [facility] regarding admission status on 11/17.

## 2022-11-16 NOTE — CARE COORDINATION
Received return phone call from Tad Mattson,  for [facility]. Tad Mattson reported they have one group home currently with 9 beds, which is full and all bedrooms are on the 2nd floor. Tad Mattson recommended trying [facility]. Pt also must be established with Mental Health Services and have a . Phone call to [facility] to gain information on programs available. [facility] staff reported they do have residential treatment and also PHP (partial hospitalization) which is a step-down where they can also reside in the same building. [facility] staff did report they cannot take anyone with a woundvac. KOLE plan of care:  SW will follow up with Pt to discuss this option upon completion of needing the woundvac. SW will contact Pt  on 11/21.

## 2022-11-16 NOTE — CARE COORDINATION
Call to [facility] to verify pt is still receiving home care services and per Sarah Rosalie pt is active with them. Confirmed that wound care was in touch with and aware of home care and Sarah Rosalie reports they spoke with wound care yesterday and are collaborating.

## 2022-11-16 NOTE — CARE COORDINATION
SW received voicemail message from admissions staff, Enrrique Mason with [facility] at [facility] stating they are denying Pt for admission due to his recent suicidal ideations, stating they do not have the psychiatric staff available to manage his mental health concerns. Attempted phone call to Enrrique Mason with [facility] at [facility] to determine if Pt went to treatment and successfully completed, would he then be eligible for admission. Attempted phone call to [phone]. No answer, voicemail message left requesting a return call with additional information regarding treatment programs offered. Phone call to [facility] to identify other support services.  reported they have a housing program and would connect this SW to them.  reported the person should already have services through [facility], but this SW can leave a message for the director. Vm message left for  requesting a return call, contact number provided. KOLE plan of care:  SW will await return call from [facility] . SW will follow up on 11/17 of this SW does not hear back.

## 2022-11-17 ENCOUNTER — TELEPHONE (OUTPATIENT)
Dept: FAMILY MEDICINE CLINIC | Age: 42
End: 2022-11-17

## 2022-11-17 ENCOUNTER — CARE COORDINATION (OUTPATIENT)
Dept: CARE COORDINATION | Age: 42
End: 2022-11-17

## 2022-11-17 DIAGNOSIS — F33.2 SEVERE EPISODE OF RECURRENT MAJOR DEPRESSIVE DISORDER, WITHOUT PSYCHOTIC FEATURES (HCC): ICD-10-CM

## 2022-11-17 DIAGNOSIS — K21.9 GASTROESOPHAGEAL REFLUX DISEASE WITHOUT ESOPHAGITIS: ICD-10-CM

## 2022-11-17 DIAGNOSIS — E11.40 TYPE 2 DIABETES MELLITUS WITH DIABETIC NEUROPATHY, WITHOUT LONG-TERM CURRENT USE OF INSULIN (HCC): ICD-10-CM

## 2022-11-17 RX ORDER — CARVEDILOL 12.5 MG/1
12.5 TABLET ORAL 2 TIMES DAILY WITH MEALS
Qty: 60 TABLET | Refills: 0 | Status: SHIPPED | OUTPATIENT
Start: 2022-11-17

## 2022-11-17 RX ORDER — CLOPIDOGREL BISULFATE 75 MG/1
75 TABLET ORAL DAILY
Qty: 30 TABLET | Refills: 0 | Status: SHIPPED | OUTPATIENT
Start: 2022-11-17

## 2022-11-17 RX ORDER — FLUOXETINE HYDROCHLORIDE 40 MG/1
80 CAPSULE ORAL DAILY
Qty: 30 CAPSULE | Refills: 0 | Status: SHIPPED | OUTPATIENT
Start: 2022-11-17 | End: 2022-12-17

## 2022-11-17 RX ORDER — PANTOPRAZOLE SODIUM 20 MG/1
20 TABLET, DELAYED RELEASE ORAL
Qty: 30 TABLET | Refills: 0 | Status: SHIPPED | OUTPATIENT
Start: 2022-11-17

## 2022-11-17 RX ORDER — LOSARTAN POTASSIUM 100 MG/1
100 TABLET ORAL DAILY
Qty: 30 TABLET | Refills: 0 | Status: SHIPPED | OUTPATIENT
Start: 2022-11-17

## 2022-11-17 RX ORDER — OMEPRAZOLE 20 MG/1
CAPSULE, DELAYED RELEASE ORAL
Qty: 30 CAPSULE | Refills: 0 | Status: SHIPPED | OUTPATIENT
Start: 2022-11-17

## 2022-11-17 RX ORDER — INSULIN GLARGINE 100 [IU]/ML
30 INJECTION, SOLUTION SUBCUTANEOUS NIGHTLY
Qty: 9 ML | Refills: 0 | Status: SHIPPED | OUTPATIENT
Start: 2022-11-17 | End: 2023-02-15

## 2022-11-17 RX ORDER — ATORVASTATIN CALCIUM 40 MG/1
40 TABLET, FILM COATED ORAL NIGHTLY
Qty: 30 TABLET | Refills: 0 | Status: SHIPPED | OUTPATIENT
Start: 2022-11-17 | End: 2022-12-17

## 2022-11-17 NOTE — TELEPHONE ENCOUNTER
Spoke with pt who states stomach feeling much better is able to eat and drinking plenty water. Mary informed.     Requested Prescriptions     Signed Prescriptions Disp Refills    carvedilol (COREG) 12.5 MG tablet 60 tablet 0     Sig: Take 1 tablet by mouth 2 times daily (with meals)     Authorizing Provider: Amanda Oneil User: LEAH Locke    losartan (COZAAR) 100 MG tablet 30 tablet 0     Sig: Take 1 tablet by mouth daily     Authorizing Provider: Amanda Marie     Ordering User: Tamra Huddleston    clopidogrel (PLAVIX) 75 MG tablet 30 tablet 0     Sig: Take 1 tablet by mouth daily     Authorizing Provider: Amanda Marie     Ordering User: LEAH Locke    pantoprazole (PROTONIX) 20 MG tablet 30 tablet 0     Sig: Take 1 tablet by mouth every morning (before breakfast)     Authorizing Provider: Amanda Oneil User: LEAH Locke    omeprazole (PRILOSEC) 20 MG delayed release capsule 30 capsule 0     Sig: Take 1 capsule by mouth once daily in the morning     Authorizing Provider: Amanda Marie     Ordering User: Tamra Huddleston    FLUoxetine (PROZAC) 40 MG capsule 30 capsule 0     Sig: Take 2 capsules by mouth daily     Authorizing Provider: Amanda Marie     Ordering User: LEAH Locke    insulin glargine (LANTUS SOLOSTAR) 100 UNIT/ML injection pen 9 mL 0     Sig: Inject 30 Units into the skin nightly     Authorizing Provider: Amanda Oneil User: LEAH Locke    atorvastatin (LIPITOR) 40 MG tablet 30 tablet 0     Sig: Take 1 tablet by mouth nightly     Authorizing Provider: Amanda Oneil User: Tamra Huddleston

## 2022-11-17 NOTE — TELEPHONE ENCOUNTER
----- Message from RAFAEL Braxton CNP sent at 11/7/2022 12:05 PM EST -----  Can we reach out to see if he went back home or if he went to [place].         ----- Message -----  From: Cori Field DO  Sent: 11/5/2022   2:37 PM EST  To: RAFAEL Braxton CNP

## 2022-11-17 NOTE — TELEPHONE ENCOUNTER
Patient returned call to [address] [place] [address] and stated that he is at home, and is feeling better.

## 2022-11-21 ENCOUNTER — CARE COORDINATION (OUTPATIENT)
Dept: CARE COORDINATION | Age: 42
End: 2022-11-21

## 2022-11-21 NOTE — CARE COORDINATION
Phone call to Pt to follow up regarding placement options. Pt couldn't have his PT today stating he has a cold and isn't feeling well. SW discussed with Pt placement options that were explored and the outcomes. SW explained to Pt that due to his recent suicidal ideations, the SNF was not able to accept him as they stated they are not equipped to deal with. Discussed with Pt that this SW looked into residential treatment programs for mental health / substance abuse, but he could not be in residential due to his woundvac. Pt reported he believes he will have the woundvac for at least 2 more months. Discussed option of IOP as Pt would be around others several days / week for several hours to assist in coping with substance abuse, mental health and Pt would not be alone. Pt reported he would have to think about it. SW encouraged Pt to contact this SW if he has questions or makes a decision. PT was in agreement. SW will follow up with Pt in one week on 11/28 regarding IOP.

## 2022-11-22 ENCOUNTER — HOSPITAL ENCOUNTER (OUTPATIENT)
Dept: WOUND CARE | Age: 42
Discharge: HOME | End: 2022-11-22
Payer: MEDICARE

## 2022-11-22 VITALS
DIASTOLIC BLOOD PRESSURE: 60 MMHG | TEMPERATURE: 98 F | RESPIRATION RATE: 18 BRPM | SYSTOLIC BLOOD PRESSURE: 94 MMHG | HEART RATE: 91 BPM

## 2022-11-22 DIAGNOSIS — E11.621 DIABETIC ULCER OF TOE OF LEFT FOOT ASSOCIATED WITH TYPE 2 DIABETES MELLITUS, WITH FAT LAYER EXPOSED (HCC): ICD-10-CM

## 2022-11-22 DIAGNOSIS — Z89.432 PARTIAL NONTRAUMATIC AMPUTATION OF FOOT, LEFT (HCC): ICD-10-CM

## 2022-11-22 DIAGNOSIS — L97.422 DIABETIC ULCER OF LEFT MIDFOOT ASSOCIATED WITH TYPE 2 DIABETES MELLITUS, WITH FAT LAYER EXPOSED (HCC): Primary | ICD-10-CM

## 2022-11-22 DIAGNOSIS — L03.116 CELLULITIS OF LEFT FOOT: ICD-10-CM

## 2022-11-22 DIAGNOSIS — E11.621 DIABETIC ULCER OF LEFT MIDFOOT ASSOCIATED WITH TYPE 2 DIABETES MELLITUS, WITH FAT LAYER EXPOSED (HCC): Primary | ICD-10-CM

## 2022-11-22 DIAGNOSIS — L97.522 DIABETIC ULCER OF TOE OF LEFT FOOT ASSOCIATED WITH TYPE 2 DIABETES MELLITUS, WITH FAT LAYER EXPOSED (HCC): ICD-10-CM

## 2022-11-22 PROCEDURE — 11042 DBRDMT SUBQ TIS 1ST 20SQCM/<: CPT

## 2022-11-22 PROCEDURE — 11043 DBRDMT MUSC&/FSCA 1ST 20/<: CPT | Performed by: NURSE PRACTITIONER

## 2022-11-22 PROCEDURE — 11043 DBRDMT MUSC&/FSCA 1ST 20/<: CPT

## 2022-11-22 PROCEDURE — 97605 NEG PRS WND THER DME<=50SQCM: CPT

## 2022-11-22 PROCEDURE — 11042 DBRDMT SUBQ TIS 1ST 20SQCM/<: CPT | Performed by: NURSE PRACTITIONER

## 2022-11-22 RX ORDER — BACITRACIN, NEOMYCIN, POLYMYXIN B 400; 3.5; 5 [USP'U]/G; MG/G; [USP'U]/G
OINTMENT TOPICAL ONCE
Status: CANCELLED | OUTPATIENT
Start: 2022-11-22 | End: 2022-11-22

## 2022-11-22 RX ORDER — LIDOCAINE 50 MG/G
OINTMENT TOPICAL ONCE
Status: CANCELLED | OUTPATIENT
Start: 2022-11-22 | End: 2022-11-22

## 2022-11-22 RX ORDER — GINSENG 100 MG
CAPSULE ORAL ONCE
Status: CANCELLED | OUTPATIENT
Start: 2022-11-22 | End: 2022-11-22

## 2022-11-22 RX ORDER — LIDOCAINE HYDROCHLORIDE 40 MG/ML
SOLUTION TOPICAL ONCE
Status: CANCELLED | OUTPATIENT
Start: 2022-11-22 | End: 2022-11-22

## 2022-11-22 RX ORDER — BACITRACIN ZINC AND POLYMYXIN B SULFATE 500; 1000 [USP'U]/G; [USP'U]/G
OINTMENT TOPICAL ONCE
Status: CANCELLED | OUTPATIENT
Start: 2022-11-22 | End: 2022-11-22

## 2022-11-22 RX ORDER — CLOBETASOL PROPIONATE 0.5 MG/G
OINTMENT TOPICAL ONCE
Status: CANCELLED | OUTPATIENT
Start: 2022-11-22 | End: 2022-11-22

## 2022-11-22 RX ORDER — LIDOCAINE 40 MG/G
CREAM TOPICAL ONCE
Status: CANCELLED | OUTPATIENT
Start: 2022-11-22 | End: 2022-11-22

## 2022-11-22 RX ORDER — BETAMETHASONE DIPROPIONATE 0.05 %
OINTMENT (GRAM) TOPICAL ONCE
Status: CANCELLED | OUTPATIENT
Start: 2022-11-22 | End: 2022-11-22

## 2022-11-22 RX ORDER — LIDOCAINE HYDROCHLORIDE 20 MG/ML
JELLY TOPICAL ONCE
Status: CANCELLED | OUTPATIENT
Start: 2022-11-22 | End: 2022-11-22

## 2022-11-22 RX ORDER — GENTAMICIN SULFATE 1 MG/G
OINTMENT TOPICAL ONCE
Status: CANCELLED | OUTPATIENT
Start: 2022-11-22 | End: 2022-11-22

## 2022-11-22 ASSESSMENT — PAIN SCALES - GENERAL: PAINLEVEL_OUTOF10: 0

## 2022-11-22 ASSESSMENT — PAIN SCALES - WONG BAKER: WONGBAKER_NUMERICALRESPONSE: 0

## 2022-11-22 NOTE — DISCHARGE INSTRUCTIONS
PHYSICIAN ORDERS AND DISCHARGE INSTRUCTIONS     NOTE: Upon discharge from the [address], you will receive a patient experience survey. We would be grateful if you would take the time to fill this survey out. Wound cleansing:              Do not scrub or use excessive force. Wash hands with soap and water before and after dressing changes. Prior to applying a clean dressing, cleanse wound with normal saline, wound cleanser, or mild soap and water. Ask the physician or nurse before getting the wound(s) wet in a shower     Daily Wound management:              Keep weight off wounds and reposition every 2 hours. Avoid standing for long periods of time. Apply wraps/stockings in AM and remove at bedtime. If swelling is present, elevate legs to the level of the heart or above for 30 minutes 4-5 times a day and/or when sitting. When taking antibiotics take entire prescription as ordered by physician do not stop taking until medicine is all gone. Wound Care Notes:  Rx: Reyna Murray  Apply for grafts 05/11/22: aLL GRAFTS approved 06/08/22 for Left Toe amp site  Reapply for graft 08/22/22 for Left toes amp site   YADY's   Right 1.07      Left    1.1           Date: 08/15/22   Home Care Discontinued. Needs Nurse visit         Grafts Left Foot Amp Site  Puraply #1 Graft #1 4x4 fenestrated 06/15/22   Puraply #2 Graft #2 4x4 fenestrated 06/22/22                                    Orders for this week: 11/22/22     FAX ORDERS TO [facility]! Left Plantar Foot wound -- Clean with soap and water, pat dry. Qwick to periwound. Apply anasept gel, stimulen powder and ioplex to wound beds. Cover with Sorbex and ABD. Wrap with Coban 2 Lite. Leave in place for 1 week. WOUND VAC THERAPY: Right Leg Amp Site  DUODERM TO PERIWOUND FOR PROTECTION.  APPLY SORBACT OVER BONE, STIMULEN and anasept spray TO WOUND BED AND BLACK FOAM TO WOUND BED. SECURE VAC DRESSING WITH DRAPE. SET WOUND VAC  CONTINUOUS SUCTION. CANISTER CHANGE WEEKLY OR ACCORDING TO VOLUME OF DRAINAGE. Wrap with ACE wrap, but do not compress tubing against skin. WOUND CARE CENTER WILL CHANGE ON  (IF VAC SUPPLIES SENT - NEED CANISTER AND BLACK FOAM DRESSING KIT). Nurse Visit Friday   Follow Up Instructions: At the [address] [place] [address] Road in 1 week: Tuesday   Primary Wound Care Provider: Go Bernard CNP   Call  for any questions or concerns.   Central [phone]

## 2022-11-22 NOTE — PROGRESS NOTES
Multilayer Compression Wrap   (Not Unna) Below the Knee    NAME:  Kirill Becker OF BIRTH:  1980  MEDICAL RECORD NUMBER:  [id number]  DATE:  11/22/2022    Multilayer compression wrap: Removed old Multilayer wrap if indicated and wash leg with mild soap/water. Applied moisturizing agent to dry skin as needed. Applied primary and secondary dressing as ordered. Applied multilayered dressing below the knee to left lower leg. Instructed patient/caregiver not to remove dressing and to keep it clean and dry. Instructed patient/caregiver on complications to report to provider, such as pain, numbness in toes, heavy drainage, and slippage of dressing. Instructed patient on purpose of compression dressing and on activity and exercise recommendations. 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 [x] Applied skin barrier prep to serenity-wound. [x] Cut strips of plastic drape to picture frame wound so that serenity-wound is     covered with the drape. [x] If bridging dressing to less prominent site, cover any intact skin that will come in contact with the Negative Pressure Therapy sponge, gauze or channel drain with plastic drape. The sponge should never touch intact skin. [x] Cut sponge, gauze or channel drain to size which will fit into the wound/ulcer bed without being forced. [x] Be sure the sponge is large enough to hold the entire round plastic flange which is attached to the tubing. Never allow flange to be larger than the sponge or it will produce suction damaging intact skin.   Total number of individual pieces of foam used within the wound bed: 3    [x] If bridging the dressing away from the primary site, be sure the bridge leads to a piece of sponge large enough to hold the entire flange without allowing any of the flange to overlap onto intact skin. [x] Covered sponge, gauze or channel drain with plastic drape. [x] Cut a hole in this plastic drape directly over the sponge the same size as the plastic drain tubing. [x] Removed plastic liner from flange and apply it directly over the hole you cut. [x] Removed the plastic cover from the flange. [x] Attached the tubing to the wound/ulcer Negative Pressure Therapy and turn it on to be sure a vacuum is created and that there are no leaks. [x] If air leaks occur, use plastic drape to patch them. [x] Secured Negative Pressure Therapy dressing with ace wrap loosely if located on an extremity. Maintain tubing outside of ace wrap. Tubing must not exert pressure on intact skin.     Applied per  Guidelines      Electronically signed by Ceasar Mera LPN on 40/34/3478 at 3:33 PM

## 2022-11-23 NOTE — PROGRESS NOTES
Wound Care Center Progress Note With Procedure    Jeremi Daley  AGE: 43 y.o. GENDER: male  : 1980  EPISODE DATE:  2022     Subjective:     Chief Complaint   Patient presents with    Wound Check     Bilateral legs          HISTORY of PRESENT ILLNESS      Jeremi Daley is a 43 y.o. male who presents today for wound evaluation of Chronic diabetic, pressure and non-healing surgical ulcer(s) of the left medial foot and lateral plantar surface. The ulcer is of marked severity. The underlying cause of the wound is diabetes, non-healing surgical. He presents today with a new wound to the right plantar foot that is diabetic and pressure in nature and of moderate severity. The patient has significant underlying medical conditions as below. The patient is having significant depression related to the recent and sudden death of his mother. 10/20/22: The patient was here last 10/4/22 and his right BKA site was well approximated, staples removed and steri strips. The prosthetic supplier was with him and plans had started to get fitted. Unfortunately, the patient fell the same night at home and the wound dehisced. He was evaluated at the ED. The patient then was not seen at the wound clinic for a few weeks as he was hospitalized for suicidal ideation. Today able to probe and visibly see bone. Home Health still in place, will apply for a wound vac. Will also start Bactrim. 22: Since his last visit to the wound clinic 22 the patient was hospitalized -22) at [facility] with necrotizing fascitis right lower leg with resulting right BKA. 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 Concha Chadwick. She placed a wound VAC to on the stump at that time. On 2022 Dr. Hernando Gutierrez performed a primary closure of his right BKA stump.     Wound Pain Timing/Severity: none  Quality of pain: N/A  Severity of pain:  0 / 10   Modifying Factors: diabetes and chronic pressure  Associated Signs/Symptoms: drainage     Diabetes: Yes, on an oral and insulin regimen, last A1c 10.5 as of 7/28/22  Diabetes education provided today:    Diabetes pathoetiology, difference between type 1 and type 2 diabetes, and progressive nature of Type 2 DM. Diabetic Neuropathy: signs and therapy. Foot care: advised to wash feet daily, pat dry and apply lotion at night, avoiding between toes. Need to look at feet daily and report to a physician any signs of inflammation or skin damage. Discussed diabetes shoes and socks. Diabetic management related to wound care    Smoking: Never smoker  Obesity:No  Anticoagulant therapy: No  Immunosuppression: No    Patient educated on the 6 essential components necessary for wound healing: Circulation, Debridements, Proper Dressings and Topical Wound Products, Infection Control, Edema Control and Offloading. Patient educated on those factors that negatively effect or impact wound healing: smoking, obesity, uncontrolled diabetes, anticoagulant and immunosuppressive regimens, inadequate nutrition, untreated arterial and venous disease if applicable and measures to manage edema. Nutritional status: well nourished. Discussed need for increased protein and calories for wound healing and good sources of protein (just over 7 grams for every 20 pounds of body weight). Animal-based foods high in protein (meat, poultry, fish, eggs, and dairy foods). Plant based foods high in protein (tofu, lentils, beans, chickpeas, nuts, quinoa and den seeds. Off Loading  Offloading or minimizing or removing weight placed on an area with poor circulation such as diabetic wounds or pressure.  This can be achieved with crutches, wheel chair, knee walker etc. Minimizing pressure through partial weight bearing (minimizing the amount of  pressure applied and or the amount of time on the area of pressure) or maintaining a non-weight bearing status can be used to promote and often can be essential for thee wound to heal. Off loading may also need to be achieved for non-weight bearing wounds such as pressure ulcers to the torso. Turning and changing positions frequently, at least every two hours. Use of pressure cushion if sitting up in chair. Skin Care  Keep skin clean and well moisturized , moisturize routinely with ointments for heavier moisturizer needs for extremely dry skin or cracks such as A&D ointment and lotions for a light moisturizer such as CeraVe or Eucerin. If incontinent change incontinence garments as soon as soiled and keeping skin clean and use barrier cream to protect the skin.         PAST MEDICAL HISTORY        Diagnosis Date    Abscess of left foot 4/22/2022    Anemia associated with acute blood loss 4/26/2022    Callus of foot     Right foot - see's Dr. Sharry Leventhal    Cellulitis of left foot 4/22/2022    Diabetic ulcer of left midfoot associated with type 2 diabetes mellitus, with muscle involvement without evidence of necrosis ([address] 4/26/2022    Diabetic ulcer of left midfoot associated with type 2 diabetes mellitus, with necrosis of muscle ([address] 6/7/2021    Diabetic ulcer of right midfoot associated with type 2 diabetes mellitus, with fat layer exposed ([address] 8/11/2020    Dizziness     positional    Essential hypertension     Follows with PCP    Hyperlipidemia     Lumbar radiculopathy     Septic embolism ([address] 6/13/2020    Subacute osteomyelitis of left foot ([address] 4/22/2022       PAST SURGICAL HISTORY    Past Surgical History:   Procedure Laterality Date    ACHILLES TENDON SURGERY Right 1/8/2021    RIGHT ACHILLES TENDON LENGTHENING REPAIR performed by Jimmy Marin DPM at [address]  03/2018    [facility]    DENTAL SURGERY      teeth extractions -half per patient    HERNIA REPAIR Bilateral 5/27/2020    BIALTERAL HERNIA INGUINAL REPAIR performed by Jatinder Chavarria MD at [address] Right 9/2/2022    LEG AMPUTATION BELOW KNEE performed by Babar Bradley MD at [address] [place] [address] Right 9/5/2022    LEG AMPUTATION BELOW KNEE REVISION performed by Babar Bradley MD at [facility] 30 2018    UT OFFICE/OUTPT VISIT,PROCEDURE ONLY Left 4/17/2018    L5-S1 HEMILAMINECTOMY, REMOVAL OF [address] LEFT SIDE performed by Samantha Eden MD at [address] Right 1/8/2021    RIGHT TRANSMETATARSAL TOE AMPUTATION performed by Marina Gould DPM at [address] Left 4/23/2022    LEFT RAY GREAT TOE AMPUTATION performed by Luci Blankenship MD at [facility]    Family History   Problem Relation Age of Onset    Heart Disease Father     Diabetes Father     Diabetes Mother     Heart Disease Mother     Other Mother     Kidney Disease Mother     Heart Attack Mother        SOCIAL HISTORY    Social History     Tobacco Use    Smoking status: Never    Smokeless tobacco: Never   Vaping Use    Vaping Use: Never used   Substance Use Topics    Alcohol use: Yes     Comment: occasionally    Drug use: No       ALLERGIES    No Known Allergies    MEDICATIONS    Current Outpatient Medications on File Prior to Encounter   Medication Sig Dispense Refill    carvedilol (COREG) 12.5 MG tablet Take 1 tablet by mouth 2 times daily (with meals) 60 tablet 0    losartan (COZAAR) 100 MG tablet Take 1 tablet by mouth daily 30 tablet 0    clopidogrel (PLAVIX) 75 MG tablet Take 1 tablet by mouth daily 30 tablet 0    pantoprazole (PROTONIX) 20 MG tablet Take 1 tablet by mouth every morning (before breakfast) 30 tablet 0    omeprazole (PRILOSEC) 20 MG delayed release capsule Take 1 capsule by mouth once daily in the morning 30 capsule 0    FLUoxetine (PROZAC) 40 MG capsule Take 2 capsules by mouth daily 30 capsule 0    insulin glargine (LANTUS SOLOSTAR) 100 UNIT/ML injection pen Inject 30 Units into the skin nightly 9 mL 0    atorvastatin (LIPITOR) 40 MG tablet Take 1 tablet by mouth nightly 30 tablet 0    sulfamethoxazole-trimethoprim (BACTRIM DS;SEPTRA DS) 800-160 MG per tablet Take 1 tablet by mouth 2 times daily for 14 days 28 tablet 0    hydrOXYzine pamoate (VISTARIL) 50 MG capsule Take 50 mg by mouth every 6 hours as needed for Anxiety      prazosin (MINIPRESS) 1 MG capsule Take 1 mg by mouth nightly      ondansetron (ZOFRAN) 4 MG tablet Take 1 tablet by mouth daily as needed for Nausea or Vomiting 30 tablet 0    guaiFENesin (MUCINEX) 600 MG extended release tablet Take 1 tablet by mouth 2 times daily for 15 days 30 tablet 0    pantoprazole (PROTONIX) 40 MG tablet       docusate sodium (COLACE, DULCOLAX) 100 MG CAPS Take 100 mg by mouth daily (Patient not taking: Reported on 11/11/2022)      aspirin 81 MG chewable tablet Take 81 mg by mouth daily (Patient not taking: Reported on 11/11/2022)      [DISCONTINUED] metFORMIN (GLUCOPHAGE) 500 MG tablet Take 2 tablets by mouth 2 times daily (with meals) Indications: Start tomorrow 03/14 360 tablet 1    [DISCONTINUED] metoclopramide (REGLAN) 10 MG tablet Take 1 tablet by mouth 4 times daily (before meals and nightly) 120 tablet 3    sucralfate (CARAFATE) 1 GM/10ML suspension Take 10 mLs by mouth 4 times daily for 7 days (Patient not taking: Reported on 11/11/2022) 414 mL 0    glyBURIDE (DIABETA) 5 MG tablet Take 2 tablets by mouth in the morning and at bedtime (Patient not taking: Reported on 11/11/2022) 120 tablet 1    Dulaglutide 1.5 MG/0.5ML SOPN Inject 1.5 mg into the skin once a week (Patient not taking: Reported on 11/11/2022) 5 pen 3    Alcohol Swabs PADS       blood glucose monitor strips Test 2 times a day & as needed for symptoms of irregular blood glucose. Dispense sufficient amount for indicated testing frequency plus additional to accommodate PRN testing needs.  (Patient not taking: Reported on 11/11/2022) 100 strip 0    [DISCONTINUED] pioglitazone (ACTOS) 30 MG tablet Take 1 tablet by mouth daily 90 tablet 1    blood glucose monitor kit and supplies Dispense sufficient amount for indicated testing frequency plus additional to accommodate PRN testing needs. Dispense all needed supplies to include: monitor, strips, lancing device, lancets, control solutions, alcohol swabs. (Patient not taking: Reported on 11/11/2022) 1 kit 0    FreeStyle Lancets MISC 1 each by Does not apply route daily (Patient not taking: Reported on 11/11/2022) 100 each 3    [DISCONTINUED] acetaminophen (TYLENOL) 325 MG tablet Take 2 tablets by mouth every 4 hours as needed for Pain or Fever 120 tablet 3     No current facility-administered medications on file prior to encounter. REVIEW OF SYSTEMS    Pertinent items are noted in HPI. Constitutional: Negative for systemic symptoms including fever, chills and malaise. Objective:      BP 94/60   Pulse 91   Temp 98 °F (36.7 °C) (Temporal)   Resp 18     PHYSICAL EXAM      General: The patient is in no acute distress. Mental status:  Patient is appropriate, is  oriented to place and plan of care. Dermatologic exam: Visual inspection of the periwound reveals the skin to be normal in turgor and texture  Wound exam: see wound description below in procedure note      Assessment:     Problem List Items Addressed This Visit          Endocrine    WD-Diabetic ulcer of left midfoot associated with type 2 diabetes mellitus, with fat layer exposed ([address] - Primary    Diabetic ulcer of toe of left foot associated with type 2 diabetes mellitus, with fat layer exposed ([address]       Other    Cellulitis of left foot    WD-Partial nontraumatic amputation of foot, left ([address]       Procedure Note    Indications:  Based on my examination of this patient's wound(s) today, sharp excision into necrotic subcutaneous tissue is required to promote healing and evaluate the extent of previous healing.     Performed by: RAFAEL Denney - CNP    Consent obtained: Yes    Time out taken:  Yes    Pain Control: Anesthetic  Anesthetic: 4% Lidocaine Liquid Topical      Debridement:Excisional Debridement    Using curette and tissue nippers the wound(s) was/were sharply debrided down through and including the removal of subcutaneous tissue. Devitalized Tissue Debrided:  slough, exudate and callus    Pre Debridement Measurements:  Are located in the Wound Documentation Flow Sheet    All active wounds listed below with today's date are evaluated  Wound(s)    debrided this date include # : 2    Post  Debridement Measurements:  Negative Pressure Wound Therapy Leg Anterior; Lower;Proximal;Right (Active)   Unit Type KCI 11/22/22 1527   Dressing Type Black Foam 11/22/22 1527   Number of pieces used 3 11/22/22 1527   Number of pieces removed 3 11/22/22 1527   Cycle Continuous 11/22/22 1527   Target Pressure (mmHg) 125 11/22/22 1527   Intensity 5 11/22/22 1527   Canister changed?  Yes 11/22/22 1527   Dressing Status New dressing applied 11/22/22 1527   Dressing Changed Changed/New 11/22/22 1527   Drainage Amount Moderate 11/22/22 1527   Dressing Change Due 11/25/22 11/22/22 1527   Wound Assessment Granulation tissue 11/22/22 1527   Rosalie-wound Assessment Intact;Fragile 11/22/22 1527   Odor None 11/22/22 1527   Number of days: 28       Wound 05/11/22 #2 (onset unknown) Left Plantar (Active)   Wound Image   11/22/22 1450   Wound Etiology Diabetic 11/22/22 1450   Dressing Status New dressing applied 11/22/22 1527   Wound Cleansed Soap and water 11/22/22 1450   Offloading for Diabetic Foot Ulcers Diabetic shoes/inserts 11/22/22 1527   Wound Length (cm) 1 cm 11/22/22 1450   Wound Width (cm) 0.3 cm 11/22/22 1450   Wound Depth (cm) 1 cm 11/22/22 1450   Wound Surface Area (cm^2) 0.3 cm^2 11/22/22 1450   Change in Wound Size % (l*w) 81.25 11/22/22 1450   Wound Volume (cm^3) 0.3 cm^3 11/22/22 1450   Wound Healing % 6 11/22/22 1450   Post-Procedure Length (cm) 1.5 cm 11/22/22 1507   Post-Procedure Width (cm) 0.8 cm 11/22/22 1507   Post-Procedure Depth (cm) 0.4 cm 11/22/22 1507   Post-Procedure Surface Area (cm^2) 1.2 cm^2 11/22/22 1507   Post-Procedure Volume (cm^3) 0.48 cm^3 11/22/22 1507   Distance Tunneling (cm) 0 cm 11/22/22 1450   Tunneling Position ___ O'Clock 0 11/22/22 1450   Undermining Starts ___ O'Clock 1200 11/22/22 1450   Undermining Ends___ O'Clock 1200 11/22/22 1450   Undermining Maxium Distance (cm) 0.3 11/22/22 1450   Wound Assessment Pink/red 11/22/22 1450   Drainage Amount Large 11/22/22 1450   Drainage Description Purulent 11/22/22 1450   Odor Mild 11/22/22 1450   Rosalie-wound Assessment Hyperkeratosis (callous) 11/22/22 1450   Margins Defined edges 11/22/22 1450   Wound Thickness Description not for Pressure Injury Full thickness 11/22/22 1450   Number of days: 196     Percent of Wound(s) Debrided: approximately 100%    Total  Area  Debrided: 9.87 sq cm     Bleeding:  Minimal    Hemostasis Achieved:  not needed    Procedural Pain:  0  / 10     Post Procedural Pain:  0 / 10     Response to treatment:  Well tolerated by patient. Procedure Note    Indications:  Based on my examination of this patient's wound(s) today, sharp excision into necrotic muscle/fascia is required to promote healing and evaluate the extent of previous healing. Performed by: RAFAEL Fajardo CNP    Consent obtained: Yes    Time out taken:  Yes    Pain Control: Anesthetic  Anesthetic: 4% Lidocaine Liquid Topical        Debridement:Excisional Debridement    Using curette the wound(s) was/were sharply debrided down through and including the removal of muscle/fascia. Devitalized Tissue Debrided:  fibrin, biofilm, slough, necrotic/eschar, and exudate    Pre Debridement Measurements:  Are located in the Wound Documentation Flow Sheet    All active wounds listed below with today's date are evaluated  Wound(s)    debrided this date include # : 3     Post  Debridement Measurements:  Negative Pressure Wound Therapy Leg Anterior; Lower;Proximal;Right (Active)   Unit Type KCI 11/22/22 1527   Dressing Type Black Foam 11/22/22 1527   Number of pieces used 3 11/22/22 1527   Number of pieces removed 3 11/22/22 1527   Cycle Continuous 11/22/22 1527   Target Pressure (mmHg) 125 11/22/22 1527   Intensity 5 11/22/22 1527   Canister changed? Yes 11/22/22 1527   Dressing Status New dressing applied 11/22/22 1527   Dressing Changed Changed/New 11/22/22 1527   Drainage Amount Moderate 11/22/22 1527   Dressing Change Due 11/25/22 11/22/22 1527   Wound Assessment Granulation tissue 11/22/22 1527   Rosalie-wound Assessment Intact;Fragile 11/22/22 1527   Odor None 11/22/22 1527   Number of days: 28   Wound 10/20/22 #3 right amp site.  (Active)   Wound Image   11/22/22 1450   Wound Etiology Non-Healing Surgical 11/22/22 1450   Dressing Status New dressing applied 11/22/22 1527   Wound Cleansed Soap and water 11/22/22 1450   Offloading for Diabetic Foot Ulcers Offloading not required 11/22/22 1527   Wound Length (cm) 0.7 cm 11/22/22 1450   Wound Width (cm) 6.8 cm 11/22/22 1450   Wound Depth (cm) 1.7 cm 11/22/22 1450   Wound Surface Area (cm^2) 4.76 cm^2 11/22/22 1450   Change in Wound Size % (l*w) 15 11/22/22 1450   Wound Volume (cm^3) 8.092 cm^3 11/22/22 1450   Wound Healing % -261 11/22/22 1450   Post-Procedure Length (cm) 0.7 cm 11/22/22 1507   Post-Procedure Width (cm) 6.8 cm 11/22/22 1507   Post-Procedure Depth (cm) 1.7 cm 11/22/22 1507   Post-Procedure Surface Area (cm^2) 4.76 cm^2 11/22/22 1507   Post-Procedure Volume (cm^3) 8.092 cm^3 11/22/22 1507   Distance Tunneling (cm) 1.5 cm 11/22/22 1450   Tunneling Position ___ O'Clock 1800 11/22/22 1450   Undermining Starts ___ O'Clock 0 11/22/22 1450   Undermining Ends___ O'Clock 0 11/22/22 1450   Undermining Maxium Distance (cm) 0 11/22/22 1450   Wound Assessment Pink/red;Slough 11/22/22 1450   Drainage Amount Moderate 11/22/22 1450   Drainage Description Yellow;Serosanguinous 11/22/22 1450   Odor None 11/22/22 1450   Rosalie-wound Assessment Maceration 11/22/22 1450   Margins Attached edges 11/22/22 1450   Wound Thickness Description not for Pressure Injury Full thickness 11/22/22 1450   Number of days: 34       Percent of Wound(s) Debrided: approximately 100%    Total  Area  Debrided:  4.76 sq cm     Bleeding:  Minimal    Hemostasis Achieved:  by pressure    Procedural Pain:  0  / 10     Post Procedural Pain:  0 / 10     Response to treatment:  Well tolerated by patient. Status of wound progress and description from last visit: Stable, regimen as bellow, follow up in one week. The patient continues to orellana depression post his mother's death. Advanced Modality Checklist: Skin substitutes    [x] Yes []  No  Is the wound Greater than 1.0 sq cm  [x] Yes []  No  Has the wound had Documented Treatment for 30 days ? [] Yes [x]  No  Recurrent Wound with Skin Substitute with Last Year?  [] Yes [x]  No  Radiographic testing in the last 3 months? [] Yes [x]  No  Vascular Assessment in the last 6 months? [x] Yes []  No  Smoking Status  [x] Yes []  No  Nutrition Assessed  [x] Yes []  No  Wound Free from infection   [x] Yes []  No  Is wound free of eschar, slough, and/or [facility]? [] Yes [x]  No  Malignant Process in Wound  [] Yes []  No  Hemoglobin A1C in the last 3 months?  last A1c 10.4 as of 4/26/22  [x] Yes []  No  Off loading Diabetic Foot Ulcer? [x] Yes []  No Compression Therapy of 20 mmHg or Greater? Plan:       Discharge Instructions         PHYSICIAN ORDERS AND DISCHARGE INSTRUCTIONS     NOTE: Upon discharge from the [address], you will receive a patient experience survey. We would be grateful if you would take the time to fill this survey out. Wound cleansing:              Do not scrub or use excessive force. Wash hands with soap and water before and after dressing changes. Prior to applying a clean dressing, cleanse wound with normal saline, wound cleanser, or mild soap and water.               Ask the physician or nurse before getting the wound(s) wet in a shower     Daily Wound management:              Keep weight off wounds and reposition every 2 hours. Avoid standing for long periods of time. Apply wraps/stockings in AM and remove at bedtime. If swelling is present, elevate legs to the level of the heart or above for 30 minutes 4-5 times a day and/or when sitting. When taking antibiotics take entire prescription as ordered by physician do not stop taking until medicine is all gone. Wound Care Notes:  Rx: Bouchra Denise  Apply for grafts 05/11/22: aLL GRAFTS approved 06/08/22 for Left Toe amp site  Reapply for graft 08/22/22 for Left toes amp site   YADY's   Right 1.07      Left    1.1           Date: 08/15/22   Home Care Discontinued. Needs Nurse visit         Grafts Left Foot Amp Site  Puraply #1 Graft #1 4x4 fenestrated 06/15/22   Puraply #2 Graft #2 4x4 fenestrated 06/22/22                                    Orders for this week: 11/22/22     FAX ORDERS TO [facility]! Left Plantar Foot wound -- Clean with soap and water, pat dry. Qwick to periwound. Apply anasept gel, stimulen powder and ioplex to wound beds. Cover with Sorbex and ABD. Wrap with Coban 2 Lite. Leave in place for 1 week. WOUND VAC THERAPY: Right Leg Amp Site  DUODERM TO PERIWOUND FOR PROTECTION. APPLY SORBACT OVER BONE, STIMULEN and anasept spray TO WOUND BED AND BLACK FOAM TO WOUND BED. SECURE VAC DRESSING WITH DRAPE. SET WOUND VAC  CONTINUOUS SUCTION. CANISTER CHANGE WEEKLY OR ACCORDING TO VOLUME OF DRAINAGE. Wrap with ACE wrap, but do not compress tubing against skin. WOUND CARE CENTER WILL CHANGE ON FRIDAYS (IF VAC SUPPLIES SENT - NEED CANISTER AND BLACK FOAM DRESSING KIT). Nurse Visit Friday   Follow Up Instructions:  At the [address] [address] in 1 week: Tuesday   Primary Wound Care Provider: Rosa Isela Galindo Otoniel Marinelli   Call [phone] for any questions or concerns. Central [phone]        Treatment Note Wound 10/20/22 #3 right amp site. -Dressing/Treatment:  (Anasept Spray, Stimulen, Sorbact, Duoderm, Black Foam, Drape)  Wound 22 #2 (onset unknown) Left Plantar-Dressing/Treatment:  (Anasept Gel, Stimulen, Ioplex, Sorbex,  ABD, Coban 2 Lite)    Written Patient Dismissal Instructions Given            Electronically signed by RAFAEL Carr CNP on 2022 at 1:00 PM

## 2022-11-28 ENCOUNTER — CARE COORDINATION (OUTPATIENT)
Dept: CARE COORDINATION | Age: 42
End: 2022-11-28

## 2022-11-28 NOTE — CARE COORDINATION
Phone call to Pt. Pt reported he needs a  bc he has a leaking pipe and basement is flooding. Fridge is not keeping things cold. Pt stated he would like to look for low income housing. SW provided contact inofrmation for [employer], describing programs and wait lists for programs currently. SW offered to send list of low income apartments in his area. Pt was in agreement, requesting the list is mailed to him via postal mail. Discussed emergency repair program through [employer]. SW will send application to Pt via postal mail. Pt was in agreement. Discussed counseling services with Pt. Pt reported he has not been referred to a therapist that he is aware of.  SW will follow up with ACM. Pt did report he has been attending [employer] with friends. Attempted phone call to [employer] to see if they have a refrigerator available. No answer, left phone number requesting call back. Attempted phone call to Alejandro Bennett with [employer] to review efforts to find placement for Pt and to discuss other options / considerations or to obtain contact information for someone who may be able to assist through [employer]. Voicemail message left requesting return call, contact information provided. SW will follow up with Pt on 12/6 regarding housing, plumbing, refrigerator. Yes

## 2022-11-29 ENCOUNTER — HOSPITAL ENCOUNTER (OUTPATIENT)
Dept: WOUND CARE | Age: 42
Discharge: HOME | End: 2022-11-29
Payer: MEDICARE

## 2022-11-29 VITALS
DIASTOLIC BLOOD PRESSURE: 87 MMHG | RESPIRATION RATE: 18 BRPM | TEMPERATURE: 97.4 F | HEART RATE: 100 BPM | SYSTOLIC BLOOD PRESSURE: 153 MMHG

## 2022-11-29 DIAGNOSIS — Z89.511 S/P BKA (BELOW KNEE AMPUTATION), RIGHT (HCC): ICD-10-CM

## 2022-11-29 DIAGNOSIS — L97.522 DIABETIC ULCER OF TOE OF LEFT FOOT ASSOCIATED WITH TYPE 2 DIABETES MELLITUS, WITH FAT LAYER EXPOSED (HCC): ICD-10-CM

## 2022-11-29 DIAGNOSIS — E11.621 DIABETIC ULCER OF LEFT MIDFOOT ASSOCIATED WITH TYPE 2 DIABETES MELLITUS, WITH FAT LAYER EXPOSED (HCC): Primary | ICD-10-CM

## 2022-11-29 DIAGNOSIS — L03.116 CELLULITIS OF LEFT FOOT: ICD-10-CM

## 2022-11-29 DIAGNOSIS — Z89.432 PARTIAL NONTRAUMATIC AMPUTATION OF FOOT, LEFT (HCC): ICD-10-CM

## 2022-11-29 DIAGNOSIS — E11.621 DIABETIC ULCER OF TOE OF LEFT FOOT ASSOCIATED WITH TYPE 2 DIABETES MELLITUS, WITH FAT LAYER EXPOSED (HCC): ICD-10-CM

## 2022-11-29 DIAGNOSIS — T87.81 DEHISCENCE OF AMPUTATION STUMP OF RIGHT LOWER EXTREMITY (HCC): ICD-10-CM

## 2022-11-29 DIAGNOSIS — L97.422 DIABETIC ULCER OF LEFT MIDFOOT ASSOCIATED WITH TYPE 2 DIABETES MELLITUS, WITH FAT LAYER EXPOSED (HCC): Primary | ICD-10-CM

## 2022-11-29 DIAGNOSIS — S88.119A BELOW-KNEE AMPUTATION (HCC): ICD-10-CM

## 2022-11-29 PROCEDURE — 11042 DBRDMT SUBQ TIS 1ST 20SQCM/<: CPT | Performed by: NURSE PRACTITIONER

## 2022-11-29 PROCEDURE — 11042 DBRDMT SUBQ TIS 1ST 20SQCM/<: CPT

## 2022-11-29 RX ORDER — LIDOCAINE 40 MG/G
CREAM TOPICAL ONCE
OUTPATIENT
Start: 2022-11-29 | End: 2022-11-29

## 2022-11-29 RX ORDER — LIDOCAINE HYDROCHLORIDE 20 MG/ML
JELLY TOPICAL ONCE
OUTPATIENT
Start: 2022-11-29 | End: 2022-11-29

## 2022-11-29 RX ORDER — BACITRACIN ZINC AND POLYMYXIN B SULFATE 500; 1000 [USP'U]/G; [USP'U]/G
OINTMENT TOPICAL ONCE
OUTPATIENT
Start: 2022-11-29 | End: 2022-11-29

## 2022-11-29 RX ORDER — BACITRACIN, NEOMYCIN, POLYMYXIN B 400; 3.5; 5 [USP'U]/G; MG/G; [USP'U]/G
OINTMENT TOPICAL ONCE
OUTPATIENT
Start: 2022-11-29 | End: 2022-11-29

## 2022-11-29 RX ORDER — GINSENG 100 MG
CAPSULE ORAL ONCE
OUTPATIENT
Start: 2022-11-29 | End: 2022-11-29

## 2022-11-29 RX ORDER — LIDOCAINE 50 MG/G
OINTMENT TOPICAL ONCE
OUTPATIENT
Start: 2022-11-29 | End: 2022-11-29

## 2022-11-29 RX ORDER — GENTAMICIN SULFATE 1 MG/G
OINTMENT TOPICAL ONCE
OUTPATIENT
Start: 2022-11-29 | End: 2022-11-29

## 2022-11-29 RX ORDER — LIDOCAINE HYDROCHLORIDE 40 MG/ML
SOLUTION TOPICAL ONCE
OUTPATIENT
Start: 2022-11-29 | End: 2022-11-29

## 2022-11-29 RX ORDER — CLOBETASOL PROPIONATE 0.5 MG/G
OINTMENT TOPICAL ONCE
OUTPATIENT
Start: 2022-11-29 | End: 2022-11-29

## 2022-11-29 RX ORDER — BETAMETHASONE DIPROPIONATE 0.05 %
OINTMENT (GRAM) TOPICAL ONCE
OUTPATIENT
Start: 2022-11-29 | End: 2022-11-29

## 2022-11-29 ASSESSMENT — PAIN SCALES - GENERAL: PAINLEVEL_OUTOF10: 0

## 2022-11-29 NOTE — PROGRESS NOTES
Multilayer Compression Wrap   (Not Unna) Below the Knee    NAME:  Romero Epperson OF BIRTH:  1980  MEDICAL RECORD NUMBER:  [id number]  DATE:  11/29/2022    Multilayer compression wrap: Removed old Multilayer wrap if indicated and wash leg with mild soap/water. Applied moisturizing agent to dry skin as needed. Applied primary and secondary dressing as ordered. Applied multilayered dressing below the knee to left lower leg. Instructed patient/caregiver not to remove dressing and to keep it clean and dry. Instructed patient/caregiver on complications to report to provider, such as pain, numbness in toes, heavy drainage, and slippage of dressing. Instructed patient on purpose of compression dressing and on activity and exercise recommendations. Negative Pressure Wound Therapy    NAME:  Romero Shah BIRTH:  1980  MEDICAL RECORD NUMBER:  [id number]  DATE:  11/29/2022    Applied Negative Pressure to right amp site wound(s)/ulcer(s). [x] Applied skin barrier prep to serenity-wound. [x] Cut strips of plastic drape to picture frame wound so that serenity-wound is     covered with the drape. [x] If bridging dressing to less prominent site, cover any intact skin that will come in contact with the Negative Pressure Therapy sponge, gauze or channel drain with plastic drape. The sponge should never touch intact skin. [x] Cut sponge, gauze or channel drain to size which will fit into the wound/ulcer bed without being forced. [x] Be sure the sponge is large enough to hold the entire round plastic flange which is attached to the tubing. Never allow flange to be larger than the sponge or it will produce suction damaging intact skin.   Total number of individual pieces of foam used within the wound bed: 4    [x] If bridging the dressing away from the primary site, be sure the bridge leads to a piece of sponge large enough to hold the entire flange without allowing any of the flange to overlap onto intact skin. [x] Covered sponge, gauze or channel drain with plastic drape. [x] Cut a hole in this plastic drape directly over the sponge the same size as the plastic drain tubing. [x] Removed plastic liner from flange and apply it directly over the hole you cut. [x] Removed the plastic cover from the flange. [x] Attached the tubing to the wound/ulcer Negative Pressure Therapy and turn it on to be sure a vacuum is created and that there are no leaks. [x] If air leaks occur, use plastic drape to patch them. [x] Secured Negative Pressure Therapy dressing with ace wrap loosely if located on an extremity. Maintain tubing outside of ace wrap. Tubing must not exert pressure on intact skin.     Applied per  Guidelines      Electronically signed by Cherrie Galvez LPN on 31/48/2528 at 10:03 AM      Electronically signed by Cherrie Galvez LPN on 01/40/5157 at 10:03 AM

## 2022-11-29 NOTE — DISCHARGE INSTRUCTIONS
PHYSICIAN ORDERS AND DISCHARGE INSTRUCTIONS     NOTE: Upon discharge from the [address], you will receive a patient experience survey. We would be grateful if you would take the time to fill this survey out. Wound cleansing:              Do not scrub or use excessive force. Wash hands with soap and water before and after dressing changes. Prior to applying a clean dressing, cleanse wound with normal saline, wound cleanser, or mild soap and water. Ask the physician or nurse before getting the wound(s) wet in a shower     Daily Wound management:              Keep weight off wounds and reposition every 2 hours. Avoid standing for long periods of time. Apply wraps/stockings in AM and remove at bedtime. If swelling is present, elevate legs to the level of the heart or above for 30 minutes 4-5 times a day and/or when sitting. When taking antibiotics take entire prescription as ordered by physician do not stop taking until medicine is all gone. Wound Care Notes:  Rx: Robyn Ness  Apply for grafts 05/11/22: aLL GRAFTS approved 06/08/22 for Left Toe amp site  Reapply for graft 08/22/22 for Left toes amp site   YADY's   Right 1.07      Left    1.1           Date: 08/15/22   Home Care Discontinued. Needs Nurse visit         Grafts Left Foot Amp Site  Puraply #1 Graft #1 4x4 fenestrated 06/15/22   Puraply #2 Graft #2 4x4 fenestrated 06/22/22                                    Orders for this week: 11/29/22     FAX ORDERS TO [facility]! Left Plantar Foot wound -- Clean with soap and water, pat dry. Qwick to periwound. Apply anasept gel, stimulen powder and ioplex to wound beds. Cover with Sorbex and ABD. Wrap with Coban 2 Lite. Leave in place for 1 week. WOUND VAC THERAPY: Right Leg Amp Site  DUODERM TO PERIWOUND FOR PROTECTION.  APPLY SORBACT OVER BONE, STIMULEN and anasept spray TO WOUND BED AND BLACK FOAM TO WOUND BED. SECURE VAC DRESSING WITH DRAPE. SET WOUND VAC  CONTINUOUS SUCTION. CANISTER CHANGE WEEKLY OR ACCORDING TO VOLUME OF DRAINAGE. Wrap with ACE wrap, but do not compress tubing against skin. WOUND CARE CENTER WILL CHANGE ON FRIDAYS (IF VAC SUPPLIES SENT - NEED CANISTER AND BLACK FOAM DRESSING KIT). Nurse Visit Friday   Follow Up Instructions: At the [address] [place] [address] in 1 week: Tuesday   Primary Wound Care Provider: Nunu Melchor CNP   Call  for any questions or concerns.   Central Scheduling: [phone]

## 2022-11-29 NOTE — PROGRESS NOTES
Wound Care Center Progress Note With Procedure    Bridgette Sky  AGE: 43 y.o. GENDER: male  : 1980  EPISODE DATE:  2022     Subjective:     Chief Complaint   Patient presents with    Wound Check     left foot, right amp site         HISTORY of PRESENT ILLNESS      Bridgette Sky is a 43 y.o. male who presents today for wound evaluation of Chronic diabetic, pressure and non-healing surgical ulcer(s) of the left medial foot and lateral plantar surface. The ulcer is of marked severity. The underlying cause of the wound is diabetes, non-healing surgical. He presents today with a new wound to the right plantar foot that is diabetic and pressure in nature and of moderate severity. The patient has significant underlying medical conditions as below. The patient is having significant depression related to the recent and sudden death of his mother. 2022: Foot wound is improved and is pinpoint and nearly closed. There is still some depth to amp site and no granulation tissue has started to cover exposed bone yet  Patient compliant with care and no issues with home health this week  Will likely continue plan of care for now and follow up 1 week      10/20/22: The patient was here last 10/4/22 and his right BKA site was well approximated, staples removed and steri strips. The prosthetic supplier was with him and plans had started to get fitted. Unfortunately, the patient fell the same night at home and the wound dehisced. He was evaluated at the ED. The patient then was not seen at the wound clinic for a few weeks as he was hospitalized for suicidal ideation. Today able to probe and visibly see bone. Home Health still in place, will apply for a wound vac. Will also start Bactrim. 22: Since his last visit to the wound clinic 22 the patient was hospitalized -22) at [facility] with necrotizing fascitis right lower leg with resulting right BKA.  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 Dinorah Vivas. She placed a wound VAC to on the stump at that time. On 9/5/2022 Dr. Laney La performed a primary closure of his right BKA stump.      Wound Pain Timing/Severity: none  Quality of pain: N/A  Severity of pain:  0 / 10   Modifying Factors: diabetes and chronic pressure  Associated Signs/Symptoms: drainage            PAST MEDICAL HISTORY        Diagnosis Date    Abscess of left foot 4/22/2022    Anemia associated with acute blood loss 4/26/2022    Callus of foot     Right foot - see's Dr. Matias Bianchi    Cellulitis of left foot 4/22/2022    Diabetic ulcer of left midfoot associated with type 2 diabetes mellitus, with muscle involvement without evidence of necrosis ([address] 4/26/2022    Diabetic ulcer of left midfoot associated with type 2 diabetes mellitus, with necrosis of muscle ([address] 6/7/2021    Diabetic ulcer of right midfoot associated with type 2 diabetes mellitus, with fat layer exposed ([address] 8/11/2020    Dizziness     positional    Essential hypertension     Follows with PCP    Hyperlipidemia     Lumbar radiculopathy     Septic embolism ([address] 6/13/2020    Subacute osteomyelitis of left foot ([address] 4/22/2022       PAST SURGICAL HISTORY    Past Surgical History:   Procedure Laterality Date    ACHILLES TENDON SURGERY Right 1/8/2021    RIGHT ACHILLES TENDON LENGTHENING REPAIR performed by Delaney Huntley DPM at [facility] 96  03/2018    [facility]    DENTAL SURGERY      teeth extractions -half per patient    HERNIA REPAIR Bilateral 5/27/2020    BIALTERAL HERNIA INGUINAL REPAIR performed by Mita Zayas MD at [address] Right 9/2/2022    LEG AMPUTATION BELOW KNEE performed by Maddie Chaudhari MD at [address] Right 9/5/2022    LEG AMPUTATION BELOW KNEE REVISION performed by Maddie Chaudhari MD at [facility] 30 2018    PA OFFICE/OUTPT VISIT,PROCEDURE ONLY Left 4/17/2018    L5-S1 HEMILAMINECTOMY, REMOVAL OF DISC LEFT SIDE performed by Genia De Leon MD at [address] Right 1/8/2021    RIGHT TRANSMETATARSAL TOE AMPUTATION performed by Jeanette Graves DPM at [address] Left 4/23/2022    LEFT RAY GREAT TOE AMPUTATION performed by Dawna Arita MD at [address]    Family History   Problem Relation Age of Onset    Heart Disease Father     Diabetes Father     Diabetes Mother     Heart Disease Mother     Other Mother     Kidney Disease Mother     Heart Attack Mother        SOCIAL HISTORY    Social History     Tobacco Use    Smoking status: Never    Smokeless tobacco: Never   Vaping Use    Vaping Use: Never used   Substance Use Topics    Alcohol use: Yes     Comment: occasionally    Drug use: No       ALLERGIES    No Known Allergies    MEDICATIONS    Current Outpatient Medications on File Prior to Encounter   Medication Sig Dispense Refill    carvedilol (COREG) 12.5 MG tablet Take 1 tablet by mouth 2 times daily (with meals) 60 tablet 0    losartan (COZAAR) 100 MG tablet Take 1 tablet by mouth daily 30 tablet 0    clopidogrel (PLAVIX) 75 MG tablet Take 1 tablet by mouth daily 30 tablet 0    pantoprazole (PROTONIX) 20 MG tablet Take 1 tablet by mouth every morning (before breakfast) 30 tablet 0    omeprazole (PRILOSEC) 20 MG delayed release capsule Take 1 capsule by mouth once daily in the morning 30 capsule 0    FLUoxetine (PROZAC) 40 MG capsule Take 2 capsules by mouth daily 30 capsule 0    insulin glargine (LANTUS SOLOSTAR) 100 UNIT/ML injection pen Inject 30 Units into the skin nightly 9 mL 0    atorvastatin (LIPITOR) 40 MG tablet Take 1 tablet by mouth nightly 30 tablet 0    sulfamethoxazole-trimethoprim (BACTRIM DS;SEPTRA DS) 800-160 MG per tablet Take 1 tablet by mouth 2 times daily for 14 days 28 tablet 0    hydrOXYzine pamoate (VISTARIL) 50 MG capsule Take 50 mg by mouth every 6 hours as needed for Anxiety      prazosin (MINIPRESS) 1 MG capsule Take 1 mg by mouth nightly      ondansetron (ZOFRAN) 4 MG tablet Take 1 tablet by mouth daily as needed for Nausea or Vomiting 30 tablet 0    pantoprazole (PROTONIX) 40 MG tablet       docusate sodium (COLACE, DULCOLAX) 100 MG CAPS Take 100 mg by mouth daily (Patient not taking: Reported on 11/11/2022)      aspirin 81 MG chewable tablet Take 81 mg by mouth daily (Patient not taking: Reported on 11/11/2022)      [DISCONTINUED] metFORMIN (GLUCOPHAGE) 500 MG tablet Take 2 tablets by mouth 2 times daily (with meals) Indications: Start tomorrow 03/14 360 tablet 1    [DISCONTINUED] metoclopramide (REGLAN) 10 MG tablet Take 1 tablet by mouth 4 times daily (before meals and nightly) 120 tablet 3    sucralfate (CARAFATE) 1 GM/10ML suspension Take 10 mLs by mouth 4 times daily for 7 days (Patient not taking: Reported on 11/11/2022) 414 mL 0    glyBURIDE (DIABETA) 5 MG tablet Take 2 tablets by mouth in the morning and at bedtime (Patient not taking: Reported on 11/11/2022) 120 tablet 1    Dulaglutide 1.5 MG/0.5ML SOPN Inject 1.5 mg into the skin once a week (Patient not taking: Reported on 11/11/2022) 5 pen 3    Alcohol Swabs PADS       blood glucose monitor strips Test 2 times a day & as needed for symptoms of irregular blood glucose. Dispense sufficient amount for indicated testing frequency plus additional to accommodate PRN testing needs. (Patient not taking: Reported on 11/11/2022) 100 strip 0    [DISCONTINUED] pioglitazone (ACTOS) 30 MG tablet Take 1 tablet by mouth daily 90 tablet 1    blood glucose monitor kit and supplies Dispense sufficient amount for indicated testing frequency plus additional to accommodate PRN testing needs. Dispense all needed supplies to include: monitor, strips, lancing device, lancets, control solutions, alcohol swabs.  (Patient not taking: Reported on 11/11/2022) 1 kit 0    FreeStyle Lancets MISC 1 each by Does not apply route daily (Patient not taking: Reported on 11/11/2022) 100 each 3    [DISCONTINUED] acetaminophen (TYLENOL) 325 MG tablet Take 2 tablets by mouth every 4 hours as needed for Pain or Fever 120 tablet 3     No current facility-administered medications on file prior to encounter. REVIEW OF SYSTEMS    Pertinent items are noted in HPI. Constitutional: Negative for systemic symptoms including fever, chills and malaise. Objective:      BP (!) 153/87   Pulse 100   Temp 97.4 °F (36.3 °C) (Temporal)   Resp 18     PHYSICAL EXAM      General: The patient is in no acute distress. Mental status:  Patient is appropriate, is  oriented to place and plan of care. Dermatologic exam: Visual inspection of the periwound reveals the skin to be normal in turgor and texture and dry  Wound exam: see wound description below in procedure note      Assessment:     Problem List Items Addressed This Visit          Endocrine    WD-Diabetic ulcer of left midfoot associated with type 2 diabetes mellitus, with fat layer exposed ([address] - Primary    Relevant Orders    Initiate Outpatient Wound Care Protocol    Neg. Pressure Wound Therapy    Diabetic ulcer of toe of left foot associated with type 2 diabetes mellitus, with fat layer exposed ([address]    Relevant Orders    Initiate Outpatient Wound Care Protocol    Neg. Pressure Wound Therapy       Other    Cellulitis of left foot    Relevant Orders    Initiate Outpatient Wound Care Protocol    Neg. Pressure Wound Therapy    WD-Partial nontraumatic amputation of foot, left ([address]    Relevant Orders    Initiate Outpatient Wound Care Protocol    Neg.  Pressure Wound Therapy    WD-Below-knee amputation (HCC)    WD-S/P BKA (below knee amputation), right [facility])    WD-Dehiscence of amputation stump of right lower extremity ([address]     Procedure Note    Indications:  Based on my examination of this patient's wound(s) today, sharp excision into necrotic subcutaneous tissue is required to promote healing and evaluate the extent of previous healing. Performed by: RAFAEL Vital CNP    Consent obtained: Yes    Time out taken:  Yes    Pain Control: N/A      Debridement:Excisional Debridement    Using curette the wound(s) was/were sharply debrided down through and including the removal of subcutaneous tissue. Devitalized Tissue Debrided:  fibrin, biofilm, slough, exudate, and callus    Pre Debridement Measurements:  Are located in the Wound Documentation Flow Sheet    All active wounds listed below with today's date are evaluated  Wound(s)    debrided this date include # : 2 and 3     Post  Debridement Measurements:  Negative Pressure Wound Therapy (Active)   Number of days: 187       Negative Pressure Wound Therapy Leg Anterior; Lower;Proximal;Right (Active)   Unit Type KCI 11/22/22 1527   Dressing Type Black Foam 11/22/22 1527   Number of pieces used 3 11/22/22 1527   Number of pieces removed 3 11/22/22 1527   Cycle Continuous 11/22/22 1527   Target Pressure (mmHg) 125 11/22/22 1527   Intensity 5 11/22/22 1527   Canister changed?  Yes 11/22/22 1527   Dressing Status New dressing applied 11/22/22 1527   Dressing Changed Changed/New 11/22/22 1527   Drainage Amount Moderate 11/22/22 1527   Dressing Change Due 11/25/22 11/22/22 1527   Wound Assessment Granulation tissue 11/22/22 1527   Rosalie-wound Assessment Intact;Fragile 11/22/22 1527   Odor None 11/22/22 1527   Number of days: 34       Wound 05/11/22 #2 Left Plantar  (onset unknown) (Active)   Wound Image   11/22/22 1450   Wound Etiology Diabetic 11/29/22 0920   Dressing Status New dressing applied 11/22/22 1527   Wound Cleansed Soap and water 11/29/22 0920   Offloading for Diabetic Foot Ulcers Diabetic shoes/inserts 11/29/22 0920   Wound Length (cm) 1 cm 11/29/22 0920   Wound Width (cm) 0.3 cm 11/29/22 0920   Wound Depth (cm) 0.3 cm 11/29/22 0920   Wound Surface Area (cm^2) 0.3 cm^2 11/29/22 0920   Change in Wound Size % (l*w) 81.25 11/29/22 0920   Wound Volume (cm^3) 0.09 cm^3 11/29/22 0920   Wound Healing % 72 11/29/22 0920   Post-Procedure Length (cm) 1 cm 11/29/22 0947   Post-Procedure Width (cm) 0.3 cm 11/29/22 0947   Post-Procedure Depth (cm) 0.3 cm 11/29/22 0947   Post-Procedure Surface Area (cm^2) 0.3 cm^2 11/29/22 0947   Post-Procedure Volume (cm^3) 0.09 cm^3 11/29/22 0947   Distance Tunneling (cm) 0 cm 11/29/22 0920   Tunneling Position ___ O'Clock 0 11/29/22 0920   Undermining Starts ___ O'Clock 0 11/29/22 0920   Undermining Ends___ O'Clock 0 11/29/22 0920   Undermining Maxium Distance (cm) 0 11/29/22 0920   Wound Assessment Granulation tissue 11/29/22 0920   Drainage Amount Moderate 11/29/22 0920   Drainage Description Serosanguinous 11/29/22 0920   Odor None 11/29/22 0920   Rosalie-wound Assessment Hyperkeratosis (callous) 11/29/22 0920   Margins Defined edges 11/29/22 0920   Wound Thickness Description not for Pressure Injury Full thickness 11/29/22 0920   Number of days: 202       Wound 10/20/22 #3 right amp site.  (Active)   Wound Image   11/22/22 1450   Wound Etiology Non-Healing Surgical 11/29/22 0920   Dressing Status New dressing applied 11/22/22 1527   Wound Cleansed Soap and water 11/29/22 0920   Offloading for Diabetic Foot Ulcers Offloading not required 11/29/22 0920   Wound Length (cm) 1 cm 11/29/22 0920   Wound Width (cm) 6.5 cm 11/29/22 0920   Wound Depth (cm) 3 cm 11/29/22 0920   Wound Surface Area (cm^2) 6.5 cm^2 11/29/22 0920   Change in Wound Size % (l*w) -16.07 11/29/22 0920   Wound Volume (cm^3) 19.5 cm^3 11/29/22 0920   Wound Healing % -771 11/29/22 0920   Post-Procedure Length (cm) 1 cm 11/29/22 0947   Post-Procedure Width (cm) 6.5 cm 11/29/22 0947   Post-Procedure Depth (cm) 3 cm 11/29/22 0947   Post-Procedure Surface Area (cm^2) 6.5 cm^2 11/29/22 0947   Post-Procedure Volume (cm^3) 19.5 cm^3 11/29/22 0947   Distance Tunneling (cm) 1.5 cm 11/29/22 0920   Tunneling Position ___ O'Clock 1800 11/29/22 0920   Undermining Starts ___ O'Clock 0 11/29/22 0920   Undermining Ends___ O'Clock 0 11/29/22 0920   Undermining Maxium Distance (cm) 0 11/29/22 0920   Wound Assessment Pink/red;Slough 11/29/22 0920   Drainage Amount Moderate 11/29/22 0920   Drainage Description Yellow;Serosanguinous 11/29/22 0920   Odor None 11/29/22 0920   Rosalie-wound Assessment Maceration 11/29/22 0920   Margins Attached edges 11/29/22 0920   Wound Thickness Description not for Pressure Injury Full thickness 11/29/22 0920   Number of days: 39       Percent of Wound(s) Debrided: approximately 100%    Total  Area  Debrided:  7 sq cm     Bleeding:  Minimal    Hemostasis Achieved:  by pressure    Procedural Pain:  0  / 10     Post Procedural Pain:  0 / 10     Response to treatment:  Well tolerated by patient. Status of wound progress and description from last visit:   Stable . Plan:       Discharge Instructions         PHYSICIAN ORDERS AND DISCHARGE INSTRUCTIONS     NOTE: Upon discharge from the [address], you will receive a patient experience survey. We would be grateful if you would take the time to fill this survey out. Wound cleansing:              Do not scrub or use excessive force. Wash hands with soap and water before and after dressing changes. Prior to applying a clean dressing, cleanse wound with normal saline, wound cleanser, or mild soap and water. Ask the physician or nurse before getting the wound(s) wet in a shower     Daily Wound management:              Keep weight off wounds and reposition every 2 hours. Avoid standing for long periods of time. Apply wraps/stockings in AM and remove at bedtime. If swelling is present, elevate legs to the level of the heart or above for 30 minutes 4-5 times a day and/or when sitting.                                       When taking antibiotics take entire prescription as ordered by physician do not stop taking until medicine is all gone.                              Wound Care Notes:  Rx: José Peak  Apply for grafts 22: aLL GRAFTS approved 22 for Left Toe amp site  Reapply for graft 22 for Left toes amp site   YADY's   Right 1.07      Left    1.1           Date: 08/15/22   Home Care Discontinued. Needs Nurse visit         Grafts Left Foot Amp Site  Puraply #1 Graft #1 4x4 fenestrated 06/15/22   Puraply #2 Graft #2 4x4 fenestrated 22                                    Orders for this week: 22     FAX ORDERS TO [facility]! Left Plantar Foot wound -- Clean with soap and water, pat dry. Qwick to periwound. Apply anasept gel, stimulen powder and ioplex to wound beds. Cover with Sorbex and ABD. Wrap with Coban 2 Lite. Leave in place for 1 week. WOUND VAC THERAPY: Right Leg Amp Site  DUODERM TO PERIWOUND FOR PROTECTION. APPLY SORBACT OVER BONE, STIMULEN and anasept spray TO WOUND BED AND BLACK FOAM TO WOUND BED. SECURE VAC DRESSING WITH DRAPE. SET WOUND VAC  CONTINUOUS SUCTION. CANISTER CHANGE WEEKLY OR ACCORDING TO VOLUME OF DRAINAGE. Wrap with ACE wrap, but do not compress tubing against skin. WOUND CARE CENTER WILL CHANGE ON  (IF VAC SUPPLIES SENT - NEED CANISTER AND BLACK FOAM DRESSING KIT). Nurse Visit Friday   Follow Up Instructions: At the [address] in 1 week: Tuesday   Primary Wound Care Provider: Oma Wan CNP   Call  for any questions or concerns.   Central [phone]        Treatment Note      Written Patient Dismissal Instructions Given            Electronically signed by RAFAEL Martin CNP on 2022 at 9:52 AM

## 2022-11-30 ENCOUNTER — CARE COORDINATION (OUTPATIENT)
Dept: CARE COORDINATION | Age: 42
End: 2022-11-30

## 2022-11-30 NOTE — CARE COORDINATION
Mailed out Low income housing and home repairs information to pt that was requested by Bernardino Avitia, she will follow up with pt.

## 2022-12-06 ENCOUNTER — CARE COORDINATION (OUTPATIENT)
Dept: CARE COORDINATION | Age: 42
End: 2022-12-06

## 2022-12-06 ENCOUNTER — HOSPITAL ENCOUNTER (OUTPATIENT)
Dept: WOUND CARE | Age: 42
Discharge: HOME | End: 2022-12-06
Payer: MEDICARE

## 2022-12-06 VITALS
SYSTOLIC BLOOD PRESSURE: 154 MMHG | RESPIRATION RATE: 18 BRPM | TEMPERATURE: 98.1 F | DIASTOLIC BLOOD PRESSURE: 92 MMHG | HEART RATE: 100 BPM

## 2022-12-06 DIAGNOSIS — Z89.432 PARTIAL NONTRAUMATIC AMPUTATION OF FOOT, LEFT (HCC): ICD-10-CM

## 2022-12-06 DIAGNOSIS — L03.116 CELLULITIS OF LEFT FOOT: ICD-10-CM

## 2022-12-06 DIAGNOSIS — L97.422 DIABETIC ULCER OF LEFT MIDFOOT ASSOCIATED WITH TYPE 2 DIABETES MELLITUS, WITH FAT LAYER EXPOSED (HCC): Primary | ICD-10-CM

## 2022-12-06 DIAGNOSIS — E11.621 DIABETIC ULCER OF TOE OF LEFT FOOT ASSOCIATED WITH TYPE 2 DIABETES MELLITUS, WITH FAT LAYER EXPOSED (HCC): ICD-10-CM

## 2022-12-06 DIAGNOSIS — E11.621 DIABETIC ULCER OF LEFT MIDFOOT ASSOCIATED WITH TYPE 2 DIABETES MELLITUS, WITH FAT LAYER EXPOSED (HCC): Primary | ICD-10-CM

## 2022-12-06 DIAGNOSIS — L97.522 DIABETIC ULCER OF TOE OF LEFT FOOT ASSOCIATED WITH TYPE 2 DIABETES MELLITUS, WITH FAT LAYER EXPOSED (HCC): ICD-10-CM

## 2022-12-06 PROCEDURE — 11055 PARING/CUTG B9 HYPRKER LES 1: CPT

## 2022-12-06 PROCEDURE — 11043 DBRDMT MUSC&/FSCA 1ST 20/<: CPT

## 2022-12-06 PROCEDURE — 11055 PARING/CUTG B9 HYPRKER LES 1: CPT | Performed by: NURSE PRACTITIONER

## 2022-12-06 PROCEDURE — 97605 NEG PRS WND THER DME<=50SQCM: CPT

## 2022-12-06 PROCEDURE — 11043 DBRDMT MUSC&/FSCA 1ST 20/<: CPT | Performed by: NURSE PRACTITIONER

## 2022-12-06 RX ORDER — BETAMETHASONE DIPROPIONATE 0.05 %
OINTMENT (GRAM) TOPICAL ONCE
OUTPATIENT
Start: 2022-12-06 | End: 2022-12-06

## 2022-12-06 RX ORDER — LIDOCAINE HYDROCHLORIDE 40 MG/ML
SOLUTION TOPICAL ONCE
OUTPATIENT
Start: 2022-12-06 | End: 2022-12-06

## 2022-12-06 RX ORDER — BACITRACIN ZINC AND POLYMYXIN B SULFATE 500; 1000 [USP'U]/G; [USP'U]/G
OINTMENT TOPICAL ONCE
OUTPATIENT
Start: 2022-12-06 | End: 2022-12-06

## 2022-12-06 RX ORDER — LIDOCAINE HYDROCHLORIDE 20 MG/ML
JELLY TOPICAL ONCE
OUTPATIENT
Start: 2022-12-06 | End: 2022-12-06

## 2022-12-06 RX ORDER — LIDOCAINE 40 MG/G
CREAM TOPICAL ONCE
OUTPATIENT
Start: 2022-12-06 | End: 2022-12-06

## 2022-12-06 RX ORDER — LIDOCAINE 50 MG/G
OINTMENT TOPICAL ONCE
OUTPATIENT
Start: 2022-12-06 | End: 2022-12-06

## 2022-12-06 RX ORDER — CLOBETASOL PROPIONATE 0.5 MG/G
OINTMENT TOPICAL ONCE
OUTPATIENT
Start: 2022-12-06 | End: 2022-12-06

## 2022-12-06 RX ORDER — GINSENG 100 MG
CAPSULE ORAL ONCE
OUTPATIENT
Start: 2022-12-06 | End: 2022-12-06

## 2022-12-06 RX ORDER — BACITRACIN, NEOMYCIN, POLYMYXIN B 400; 3.5; 5 [USP'U]/G; MG/G; [USP'U]/G
OINTMENT TOPICAL ONCE
OUTPATIENT
Start: 2022-12-06 | End: 2022-12-06

## 2022-12-06 RX ORDER — GENTAMICIN SULFATE 1 MG/G
OINTMENT TOPICAL ONCE
OUTPATIENT
Start: 2022-12-06 | End: 2022-12-06

## 2022-12-06 ASSESSMENT — PAIN SCALES - WONG BAKER: WONGBAKER_NUMERICALRESPONSE: 0

## 2022-12-06 ASSESSMENT — PAIN SCALES - GENERAL: PAINLEVEL_OUTOF10: 0

## 2022-12-06 NOTE — DISCHARGE INSTRUCTIONS
PHYSICIAN ORDERS AND DISCHARGE INSTRUCTIONS     NOTE: Upon discharge from the [address], you will receive a patient experience survey. We would be grateful if you would take the time to fill this survey out. Wound cleansing:              Do not scrub or use excessive force. Wash hands with soap and water before and after dressing changes. Prior to applying a clean dressing, cleanse wound with normal saline, wound cleanser, or mild soap and water. Ask the physician or nurse before getting the wound(s) wet in a shower     Daily Wound management:              Keep weight off wounds and reposition every 2 hours. Avoid standing for long periods of time. Apply wraps/stockings in AM and remove at bedtime. If swelling is present, elevate legs to the level of the heart or above for 30 minutes 4-5 times a day and/or when sitting. When taking antibiotics take entire prescription as ordered by physician do not stop taking until medicine is all gone. Wound Care Notes:  Rx: Robyn Ness  Apply for grafts 05/11/22: aLL GRAFTS approved 06/08/22 for Left Toe amp site  Reapply for graft 08/22/22 for Left toes amp site   YADY's   Right 1.07      Left    1.1           Date: 08/15/22   Home Care Discontinued. Needs Nurse visit         Grafts Left Foot Amp Site  Puraply #1 Graft #1 4x4 fenestrated 06/15/22   Puraply #2 Graft #2 4x4 fenestrated 06/22/22                                    Orders for this week: 12/6/22     FAX ORDERS TO [facility]! Left Plantar Foot wound -- Clean with soap and water, pat dry. Qwick to periwound. Apply anasept gel, stimulen powder and ioplex to wound beds. Cover with Sorbex and ABD. Wrap with Coban 2 Lite. Leave in place for 1 week. WOUND VAC THERAPY: Right Leg Amp Site  DUODERM TO PERIWOUND FOR PROTECTION.  APPLY SORBACT OVER BONE, STIMULEN and anasept spray TO WOUND BED AND BLACK FOAM TO WOUND BED (be sure to tuck foam into deeper area on lateral aspect of wound). SECURE VAC DRESSING WITH DRAPE. SET WOUND VAC  CONTINUOUS SUCTION. CANISTER CHANGE WEEKLY OR ACCORDING TO VOLUME OF DRAINAGE. Wrap with ACE wrap, but do not compress tubing against skin. Home Care to change vac on . WOUND CARE CENTER WILL CHANGE ON TUESDAY (IF VAC SUPPLIES SENT - NEED CANISTER AND BLACK FOAM DRESSING KIT). Follow Up Instructions: At the [address] [facility] [address] in 1 week: Tuesday   Primary Wound Care Provider: Lisa Elizabeth CNP   Call  for any questions or concerns.   Central [phone]

## 2022-12-06 NOTE — TELEPHONE ENCOUNTER
Rosa Cabezas from [facility] called and is requesting a verbal to continue PT once a week for 3 weeks to work on patients indurance. Patient is also need medications that were written thru the Mental Health, patient does not have a follow up appt with mental health.   PATIENT HAS 4 PILLS LEFT OF EACH MEDICATION  QUETIAPINE 200 MG ONE AT BEDTIME   (NOT ON THE LIST THAT I COULD FIND)  [address]

## 2022-12-06 NOTE — CARE COORDINATION
Phone call to Cornell Leroy to follow up regarding his refrigerator and housing. Pt reported he attended wound care today, stating he is \"looking at another month of having the woundvac\". Pt reported he has been talking with brother about future plans regarding his house, stating the plan is to still try to sell house if they can get it into his brother's name. Discussing going into a low income housing. Pt reported he did receive the list this KOLE sent and he would look through it. SW did encourage Pt to take notes on each apartment building he has called and to be consistent with calling back. SW engaged Pt in discussion regarding his refrigerator. Pt reported he does have a working refrigerator as his original fridge is working, stating the outlet was the issue. Pt reported he filled his refrigerator / freezer with food once he got paid. Pt reported his friend has been coming over to help him cooking. Engaged Pt in discussion regarding mental health. PT reported his [employer] has programs and support groups for individuals with mental health and alcohol abuse issues, declining referral for counseling. Pt reported he was baptized this past Sunday. PT reported he attends [facility] [place] and he was baptized this past Sunday. Pt reported he feels he is making improvements with his mental health since he has been attending [employer]. Pt declined MH counseling, but did agree to referral for medication management. Pt reported he is running out of the medication prescribed to him from the psychiatric hospital.  Pt reported he wants to reach out to them. KOLE provided contact information and encouraged Pt to schedule with a mental health agency nearby prior to calling so they might be willing to refill prescription until that date of his intake. Pt stated understanding. KOLE also provided contact information for [facility].       KOLE plan of care:  KOLE will follow up with Pt in one week 12/13 regarding [address] services and housing.

## 2022-12-06 NOTE — PROGRESS NOTES
Brant Mullet Compression Wrap   (Not Unna) Below the Knee    NAME:  Parrish Obrien OF BIRTH:  1980  MEDICAL RECORD NUMBER:  [id number]  DATE:  12/6/2022    Multilayer compression wrap: Removed old Multilayer wrap if indicated and wash leg with mild soap/water. Applied moisturizing agent to dry skin as needed. Applied primary and secondary dressing as ordered. Applied multilayered dressing below the knee to left lower leg. Instructed patient/caregiver not to remove dressing and to keep it clean and dry. Instructed patient/caregiver on complications to report to provider, such as pain, numbness in toes, heavy drainage, and slippage of dressing. Instructed patient on purpose of compression dressing and on activity and exercise recommendations.       Electronically signed by Jigar Gayle RN on 12/6/2022 at 10:06 AM

## 2022-12-06 NOTE — PROGRESS NOTES
Wound Care Center Progress Note With Procedure    Clarisse Monroy  AGE: 43 y.o. GENDER: male  : 1980  EPISODE DATE:  2022     Subjective:     Chief Complaint   Patient presents with    Wound Check     Right amp site. HISTORY of PRESENT ILLNESS      Clarisse Monroy is a 43 y.o. male who presents today for wound evaluation of Chronic diabetic, pressure and non-healing surgical ulcer(s) of the left medial foot and lateral plantar surface. The ulcer is of marked severity. The underlying cause of the wound is diabetes, non-healing surgical. He presents today with a new wound to the right plantar foot that is diabetic and pressure in nature and of moderate severity. The patient has significant underlying medical conditions as below. The patient is having significant depression related to the recent and sudden death of his mother. 10/20/22: The patient was here last 10/4/22 and his right BKA site was well approximated, staples removed and steri strips. The prosthetic supplier was with him and plans had started to get fitted. Unfortunately, the patient fell the same night at home and the wound dehisced. He was evaluated at the ED. The patient then was not seen at the wound clinic for a few weeks as he was hospitalized for suicidal ideation. Today able to probe and visibly see bone. Home Health still in place, will apply for a wound vac. Will also start Bactrim. 22: Since his last visit to the wound clinic 22 the patient was hospitalized -22) at [facility] with necrotizing fascitis right lower leg with resulting right BKA. 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 Josh Turner. She placed a wound VAC to on the stump at that time. On 2022 Dr. Korey Kenyon performed a primary closure of his right BKA stump.     Wound Pain Timing/Severity: none  Quality of pain: N/A  Severity of pain:  0 / 10   Modifying Factors: diabetes and chronic pressure  Associated Signs/Symptoms: drainage     Diabetes: Yes, on an oral and insulin regimen, last A1c 10.5 as of 7/28/22  Diabetes education provided today:    Diabetes pathoetiology, difference between type 1 and type 2 diabetes, and progressive nature of Type 2 DM. Diabetic Neuropathy: signs and therapy. Foot care: advised to wash feet daily, pat dry and apply lotion at night, avoiding between toes. Need to look at feet daily and report to a physician any signs of inflammation or skin damage. Discussed diabetes shoes and socks. Diabetic management related to wound care    Smoking: Never smoker  Obesity:No  Anticoagulant therapy: No  Immunosuppression: No    Patient educated on the 6 essential components necessary for wound healing: Circulation, Debridements, Proper Dressings and Topical Wound Products, Infection Control, Edema Control and Offloading. Patient educated on those factors that negatively effect or impact wound healing: smoking, obesity, uncontrolled diabetes, anticoagulant and immunosuppressive regimens, inadequate nutrition, untreated arterial and venous disease if applicable and measures to manage edema. Nutritional status: well nourished. Discussed need for increased protein and calories for wound healing and good sources of protein (just over 7 grams for every 20 pounds of body weight). Animal-based foods high in protein (meat, poultry, fish, eggs, and dairy foods). Plant based foods high in protein (tofu, lentils, beans, chickpeas, nuts, quinoa and den seeds. Off Loading  Offloading or minimizing or removing weight placed on an area with poor circulation such as diabetic wounds or pressure.  This can be achieved with crutches, wheel chair, knee walker etc. Minimizing pressure through partial weight bearing (minimizing the amount of  pressure applied and or the amount of time on the area of pressure) or maintaining a non-weight bearing status can be used to promote and often can be essential for thee wound to heal. Off loading may also need to be achieved for non-weight bearing wounds such as pressure ulcers to the torso. Turning and changing positions frequently, at least every two hours. Use of pressure cushion if sitting up in chair. Skin Care  Keep skin clean and well moisturized , moisturize routinely with ointments for heavier moisturizer needs for extremely dry skin or cracks such as A&D ointment and lotions for a light moisturizer such as CeraVe or Eucerin. If incontinent change incontinence garments as soon as soiled and keeping skin clean and use barrier cream to protect the skin.         PAST MEDICAL HISTORY        Diagnosis Date    Abscess of left foot 4/22/2022    Anemia associated with acute blood loss 4/26/2022    Callus of foot     Right foot - see's Dr. Matias Bianchi    Cellulitis of left foot 4/22/2022    Diabetic ulcer of left midfoot associated with type 2 diabetes mellitus, with muscle involvement without evidence of necrosis ([address] 4/26/2022    Diabetic ulcer of left midfoot associated with type 2 diabetes mellitus, with necrosis of muscle ([address] 6/7/2021    Diabetic ulcer of right midfoot associated with type 2 diabetes mellitus, with fat layer exposed ([address] 8/11/2020    Dizziness     positional    Essential hypertension     Follows with PCP    Hyperlipidemia     Lumbar radiculopathy     Septic embolism ([address] 6/13/2020    Subacute osteomyelitis of left foot ([address] 4/22/2022       PAST SURGICAL HISTORY    Past Surgical History:   Procedure Laterality Date    ACHILLES TENDON SURGERY Right 1/8/2021    RIGHT ACHILLES TENDON LENGTHENING REPAIR performed by Delaney Huntley DPM at [address] [place] [address]  03/2018    [facility]    DENTAL SURGERY      teeth extractions -half per patient    HERNIA REPAIR Bilateral 5/27/2020    BIALTERAL HERNIA INGUINAL REPAIR performed by Mita Zayas MD at [address] [facility] Right 9/2/2022    LEG AMPUTATION BELOW KNEE performed by Sade Sands MD at [address] [facility] Right 9/5/2022    LEG AMPUTATION BELOW KNEE REVISION performed by Sade Sands MD at [facility] 30 2018    [facility] OFFICE/OUTPT VISIT,PROCEDURE ONLY Left 4/17/2018    L5-S1 HEMILAMINECTOMY, REMOVAL OF [address] LEFT SIDE performed by Jaycee Brenner MD at [address] Right 1/8/2021    RIGHT TRANSMETATARSAL TOE AMPUTATION performed by Angelia Gregorio DPM at [address] Left 4/23/2022    LEFT RAY GREAT TOE AMPUTATION performed by Ke Davila MD at [address] [facility]    Family History   Problem Relation Age of Onset    Heart Disease Father     Diabetes Father     Diabetes Mother     Heart Disease Mother     Other Mother     Kidney Disease Mother     Heart Attack Mother        SOCIAL HISTORY    Social History     Tobacco Use    Smoking status: Never    Smokeless tobacco: Never   Vaping Use    Vaping Use: Never used   Substance Use Topics    Alcohol use: Yes     Comment: occasionally    Drug use: No       ALLERGIES    No Known Allergies    MEDICATIONS    Current Outpatient Medications on File Prior to Encounter   Medication Sig Dispense Refill    carvedilol (COREG) 12.5 MG tablet Take 1 tablet by mouth 2 times daily (with meals) 60 tablet 0    losartan (COZAAR) 100 MG tablet Take 1 tablet by mouth daily 30 tablet 0    clopidogrel (PLAVIX) 75 MG tablet Take 1 tablet by mouth daily 30 tablet 0    pantoprazole (PROTONIX) 20 MG tablet Take 1 tablet by mouth every morning (before breakfast) 30 tablet 0    omeprazole (PRILOSEC) 20 MG delayed release capsule Take 1 capsule by mouth once daily in the morning 30 capsule 0    FLUoxetine (PROZAC) 40 MG capsule Take 2 capsules by mouth daily 30 capsule 0    insulin glargine (LANTUS SOLOSTAR) 100 UNIT/ML injection pen Inject 30 Units into the skin nightly 9 mL 0    atorvastatin (LIPITOR) 40 MG tablet Take 1 tablet by mouth nightly 30 tablet 0    hydrOXYzine pamoate (VISTARIL) 50 MG capsule Take 50 mg by mouth every 6 hours as needed for Anxiety      prazosin (MINIPRESS) 1 MG capsule Take 1 mg by mouth nightly      ondansetron (ZOFRAN) 4 MG tablet Take 1 tablet by mouth daily as needed for Nausea or Vomiting 30 tablet 0    pantoprazole (PROTONIX) 40 MG tablet       docusate sodium (COLACE, DULCOLAX) 100 MG CAPS Take 100 mg by mouth daily (Patient not taking: Reported on 11/11/2022)      aspirin 81 MG chewable tablet Take 81 mg by mouth daily (Patient not taking: Reported on 11/11/2022)      [DISCONTINUED] metFORMIN (GLUCOPHAGE) 500 MG tablet Take 2 tablets by mouth 2 times daily (with meals) Indications: Start tomorrow 03/14 360 tablet 1    [DISCONTINUED] metoclopramide (REGLAN) 10 MG tablet Take 1 tablet by mouth 4 times daily (before meals and nightly) 120 tablet 3    sucralfate (CARAFATE) 1 GM/10ML suspension Take 10 mLs by mouth 4 times daily for 7 days (Patient not taking: Reported on 11/11/2022) 414 mL 0    glyBURIDE (DIABETA) 5 MG tablet Take 2 tablets by mouth in the morning and at bedtime (Patient not taking: Reported on 11/11/2022) 120 tablet 1    Dulaglutide 1.5 MG/0.5ML SOPN Inject 1.5 mg into the skin once a week (Patient not taking: Reported on 11/11/2022) 5 pen 3    Alcohol Swabs PADS       blood glucose monitor strips Test 2 times a day & as needed for symptoms of irregular blood glucose. Dispense sufficient amount for indicated testing frequency plus additional to accommodate PRN testing needs. (Patient not taking: Reported on 11/11/2022) 100 strip 0    [DISCONTINUED] pioglitazone (ACTOS) 30 MG tablet Take 1 tablet by mouth daily 90 tablet 1    blood glucose monitor kit and supplies Dispense sufficient amount for indicated testing frequency plus additional to accommodate PRN testing needs. Dispense all needed supplies to include: monitor, strips, lancing device, lancets, control solutions, alcohol swabs.  (Patient not taking: Reported on 11/11/2022) 1 kit 0    FreeStyle Lancets MISC 1 each by Does not apply route daily (Patient not taking: Reported on 11/11/2022) 100 each 3    [DISCONTINUED] acetaminophen (TYLENOL) 325 MG tablet Take 2 tablets by mouth every 4 hours as needed for Pain or Fever 120 tablet 3     No current facility-administered medications on file prior to encounter. REVIEW OF SYSTEMS    Pertinent items are noted in HPI. Constitutional: Negative for systemic symptoms including fever, chills and malaise. Objective:      BP (!) 154/92   Pulse 100   Temp 98.1 °F (36.7 °C) (Temporal)   Resp 18     PHYSICAL EXAM      General: The patient is in no acute distress. Mental status:  Patient is appropriate, is  oriented to place and plan of care. Dermatologic exam: Visual inspection of the periwound reveals the skin to be normal in turgor and texture  Wound exam: see wound description below in procedure note      Assessment:     Problem List Items Addressed This Visit          Endocrine    WD-Diabetic ulcer of left midfoot associated with type 2 diabetes mellitus, with fat layer exposed ([address] - Primary    Relevant Orders    Initiate Outpatient Wound Care Protocol    Neg. Pressure Wound Therapy    Diabetic ulcer of toe of left foot associated with type 2 diabetes mellitus, with fat layer exposed ([address]    Relevant Orders    Initiate Outpatient Wound Care Protocol    Neg. Pressure Wound Therapy       Other    Cellulitis of left foot    Relevant Orders    Initiate Outpatient Wound Care Protocol    Neg. Pressure Wound Therapy    WD-Partial nontraumatic amputation of foot, left ([address]    Relevant Orders    Initiate Outpatient Wound Care Protocol    Neg. Pressure Wound Therapy     Procedure Note    Indications:  Based on my examination of this patient's wound(s) today, sharp excision into necrotic muscle/fascia is required to promote healing and evaluate the extent of previous healing.     Performed by: Olivia Alvarez Fleeta Blocker - CNP    Consent obtained: Yes    Time out taken:  Yes    Pain Control: Anesthetic  Anesthetic: 4% Lidocaine Liquid Topical        Debridement:Excisional Debridement    Using curette the wound(s) was/were sharply debrided down through and including the removal of muscle/fascia. Devitalized Tissue Debrided:  fibrin, biofilm, slough, necrotic/eschar, and exudate    Pre Debridement Measurements:  Are located in the Wound Documentation Flow Sheet    All active wounds listed below with today's date are evaluated  Wound(s)    debrided this date include # : 3     Post  Debridement Measurements:  Negative Pressure Wound Therapy Leg Anterior; Lower;Proximal;Right (Active)   Unit Type KCI 12/06/22 1007   Dressing Type Black Foam 12/06/22 1007   Number of pieces used 3 12/06/22 1007   Number of pieces removed 1 12/06/22 0939   Cycle Continuous 12/06/22 1007   Target Pressure (mmHg) 125 12/06/22 1007   Intensity 5 12/06/22 1007   Canister changed? Yes 12/06/22 1007   Dressing Status New dressing applied 12/06/22 1007   Dressing Changed Changed/New 12/06/22 1007   Drainage Amount Moderate 12/06/22 1007   Drainage Description Serosanguinous 12/06/22 1007   Dressing Change Due 12/06/22 12/06/22 1007   Wound Assessment Granulation tissue 11/29/22 1015   Rsoalie-wound Assessment Intact;Fragile 11/29/22 1015   Odor None 11/29/22 1015   Number of days: 41       Wound 05/11/22 #2 Left Plantar  (onset unknown) (Active)   Wound Image   11/22/22 1450   Wound Etiology Diabetic 12/06/22 5138   Dressing Status New dressing applied;Clean;Dry; Intact 11/29/22 0958   Wound Cleansed Soap and water 12/06/22 0928   Offloading for Diabetic Foot Ulcers Diabetic shoes/inserts 12/06/22 1007   Wound Length (cm) 0 cm 12/06/22 0928   Wound Width (cm) 0 cm 12/06/22 0928   Wound Depth (cm) 0 cm 12/06/22 0928   Wound Surface Area (cm^2) 0 cm^2 12/06/22 0928   Change in Wound Size % (l*w) 100 12/06/22 0928   Wound Volume (cm^3) 0 cm^3 12/06/22 0928   Wound Healing % 100 12/06/22 0928   Post-Procedure Length (cm) 0.1 cm 12/06/22 0939   Post-Procedure Width (cm) 0.1 cm 12/06/22 0939   Post-Procedure Depth (cm) 0.1 cm 12/06/22 0939   Post-Procedure Surface Area (cm^2) 0.01 cm^2 12/06/22 0939   Post-Procedure Volume (cm^3) 0.001 cm^3 12/06/22 0939   Distance Tunneling (cm) 0 cm 12/06/22 0928   Tunneling Position ___ O'Clock 0 12/06/22 0928   Undermining Starts ___ O'Clock 0 12/06/22 0928   Undermining Ends___ O'Clock 0 12/06/22 0928   Undermining Maxium Distance (cm) 0 12/06/22 0928   Wound Assessment Granulation tissue 12/06/22 0939   Drainage Amount Moderate 12/06/22 0939   Drainage Description Serosanguinous 12/06/22 0939   Odor None 12/06/22 0928   Rosalie-wound Assessment Hyperkeratosis (callous) 12/06/22 0928   Margins Defined edges 12/06/22 0928   Wound Thickness Description not for Pressure Injury Full thickness 12/06/22 0939   Number of days: 209       Wound 10/20/22 #3 right amp site. (Active)   Wound Image   11/22/22 1450   Wound Etiology Non-Healing Surgical 12/06/22 9803   Dressing Status New dressing applied;Clean;Dry; Intact 11/29/22 0958   Wound Cleansed Soap and water 12/06/22 0928   Offloading for Diabetic Foot Ulcers Offloading not required 12/06/22 1007   Wound Length (cm) 1 cm 12/06/22 0928   Wound Width (cm) 7 cm 12/06/22 0928   Wound Depth (cm) 1 cm 12/06/22 0928   Wound Surface Area (cm^2) 7 cm^2 12/06/22 0928   Change in Wound Size % (l*w) -25 12/06/22 0928   Wound Volume (cm^3) 7 cm^3 12/06/22 0928   Wound Healing % -213 12/06/22 0928   Post-Procedure Length (cm) 1 cm 12/06/22 0939   Post-Procedure Width (cm) 7 cm 12/06/22 0939   Post-Procedure Depth (cm) 1 cm 12/06/22 0939   Post-Procedure Surface Area (cm^2) 7 cm^2 12/06/22 0939   Post-Procedure Volume (cm^3) 7 cm^3 12/06/22 0939   Distance Tunneling (cm) 2.5 cm 12/06/22 0928   Tunneling Position ___ O'Clock 1500 12/06/22 0928   Undermining Starts ___ O'Clock 0 12/06/22 5632 Undermining Ends___ O'Clock 0 12/06/22 0928   Undermining Maxium Distance (cm) 0 12/06/22 0928   Wound Assessment Pink/red;Slough 12/06/22 0928   Drainage Amount Moderate 12/06/22 0928   Drainage Description Yellow;Serosanguinous 12/06/22 0928   Odor None 12/06/22 0928   Rosalie-wound Assessment Maceration 12/06/22 0928   Margins Attached edges 12/06/22 0928   Wound Thickness Description not for Pressure Injury Full thickness 12/06/22 0928   Number of days: 47     Percent of Wound(s) Debrided: approximately 100%    Total  Area  Debrided: 7 sq cm     Bleeding:  Minimal    Hemostasis Achieved:  by pressure    Procedural Pain:  0  / 10     Post Procedural Pain:  0 / 10     Response to treatment:  Well tolerated by patient. Status of wound progress and description from last visit: Stable, regimen as bellow, follow up in one week. The patient continues to orellana depression post his mother's death. Hyperkeratotic tissue noted left foot, paring of callous completed using curette. No wound present within the callous. Medical necessity includes advanced atrophic changes to include decrease hair growth lower legs, thickening, discolored toenails, rubor, edema and pain with ambulating. RAFAEL De La Torre is PCP last seen 11/11/22. Plan:       Discharge Instructions         PHYSICIAN ORDERS AND DISCHARGE INSTRUCTIONS     NOTE: Upon discharge from the [address], you will receive a patient experience survey. We would be grateful if you would take the time to fill this survey out. Wound cleansing:              Do not scrub or use excessive force. Wash hands with soap and water before and after dressing changes. Prior to applying a clean dressing, cleanse wound with normal saline, wound cleanser, or mild soap and water.               Ask the physician or nurse before getting the wound(s) wet in a shower     Daily Wound management:              Keep weight off wounds and reposition every 2 hours. Avoid standing for long periods of time. Apply wraps/stockings in AM and remove at bedtime. If swelling is present, elevate legs to the level of the heart or above for 30 minutes 4-5 times a day and/or when sitting. When taking antibiotics take entire prescription as ordered by physician do not stop taking until medicine is all gone. Wound Care Notes:  Rx: Yari Gooden  Apply for grafts 05/11/22: aLL GRAFTS approved 06/08/22 for Left Toe amp site  Reapply for graft 08/22/22 for Left toes amp site   YADY's   Right 1.07      Left    1.1           Date: 08/15/22   Home Care Discontinued. Needs Nurse visit         Grafts Left Foot Amp Site  Puraply #1 Graft #1 4x4 fenestrated 06/15/22   Puraply #2 Graft #2 4x4 fenestrated 06/22/22                                    Orders for this week: 12/6/22     FAX ORDERS TO [facility]! Left Plantar Foot wound -- Clean with soap and water, pat dry. Qwick to periwound. Apply anasept gel, stimulen powder and ioplex to wound beds. Cover with Sorbex and ABD. Wrap with Coban 2 Lite. Leave in place for 1 week. WOUND VAC THERAPY: Right Leg Amp Site  DUODERM TO PERIWOUND FOR PROTECTION. APPLY SORBACT OVER BONE, STIMULEN and anasept spray TO WOUND BED AND BLACK FOAM TO WOUND BED (be sure to tuck foam into deeper area on lateral aspect of wound). SECURE VAC DRESSING WITH DRAPE. SET WOUND VAC  CONTINUOUS SUCTION. CANISTER CHANGE WEEKLY OR ACCORDING TO VOLUME OF DRAINAGE. Wrap with ACE wrap, but do not compress tubing against skin. Home Care to change vac on FRIDAYS. WOUND CARE CENTER WILL CHANGE ON TUESDAY (IF VAC SUPPLIES SENT - NEED CANISTER AND BLACK FOAM DRESSING KIT). Follow Up Instructions:  At the [address] in 1 week: Tuesday   Primary Wound Care Provider: Fracisco Marlow CNP   Call  for any questions or concerns. Central [phone]        Treatment Note Wound 10/20/22 #3 right amp site. -Dressing/Treatment:  (Anasept Spray, Stimulen, Sorbact, Duoderm, Black Foam, Drape)  Wound 22 #2 Left Plantar  (onset unknown)-Dressing/Treatment:  (Anasept Gel, Stimulen, ioplex, Sorbex,  ABD, Coban 2 Lite)    Written Patient Dismissal Instructions Given            Electronically signed by RAFAEL Mejias CNP on 2022 at 10:42 AM

## 2022-12-07 RX ORDER — QUETIAPINE FUMARATE 200 MG/1
200 TABLET, FILM COATED ORAL 2 TIMES DAILY
Qty: 60 TABLET | Refills: 0 | Status: SHIPPED | OUTPATIENT
Start: 2022-12-07

## 2022-12-07 RX ORDER — QUETIAPINE FUMARATE 200 MG/1
TABLET, FILM COATED ORAL
COMMUNITY
Start: 2022-10-26 | End: 2022-12-07 | Stop reason: SDUPTHER

## 2022-12-07 RX ORDER — PRAZOSIN HYDROCHLORIDE 1 MG/1
1 CAPSULE ORAL NIGHTLY
Qty: 30 CAPSULE | Refills: 0 | Status: SHIPPED | OUTPATIENT
Start: 2022-12-07

## 2022-12-07 NOTE — TELEPHONE ENCOUNTER
Requested Prescriptions     Signed Prescriptions Disp Refills    prazosin (MINIPRESS) 1 MG capsule 30 capsule 0     Sig: Take 1 capsule by mouth nightly     Authorizing Provider: Anders Lorenzana     Ordering User: Jimbo martino pt  Dharmesh Luna did not ID himself voice mail

## 2022-12-07 NOTE — TELEPHONE ENCOUNTER
Requested Prescriptions     Signed Prescriptions Disp Refills    prazosin (MINIPRESS) 1 MG capsule 30 capsule 0     Sig: Take 1 capsule by mouth nightly     Authorizing Provider: Tim Almanza     Ordering User: LEAH Li    QUEtiapine (SEROQUEL) 200 MG tablet 60 tablet 0     Sig: Take 1 tablet by mouth 2 times daily     Authorizing Provider: Tim Almanza     Ordering User: Lady Rich     Spoke with Portia Crawley w/c/m hc gave verbal approval

## 2022-12-09 RX ORDER — ASPIRIN 81 MG/1
81 TABLET, CHEWABLE ORAL DAILY
Qty: 30 TABLET | Refills: 2 | Status: SHIPPED | OUTPATIENT
Start: 2022-12-09

## 2022-12-13 ENCOUNTER — HOSPITAL ENCOUNTER (OUTPATIENT)
Dept: WOUND CARE | Age: 42
Discharge: HOME | End: 2022-12-13
Payer: MEDICARE

## 2022-12-13 ENCOUNTER — CARE COORDINATION (OUTPATIENT)
Dept: CARE COORDINATION | Age: 42
End: 2022-12-13

## 2022-12-13 VITALS
DIASTOLIC BLOOD PRESSURE: 93 MMHG | TEMPERATURE: 97.3 F | RESPIRATION RATE: 18 BRPM | HEART RATE: 94 BPM | SYSTOLIC BLOOD PRESSURE: 161 MMHG

## 2022-12-13 DIAGNOSIS — L97.422 DIABETIC ULCER OF LEFT MIDFOOT ASSOCIATED WITH TYPE 2 DIABETES MELLITUS, WITH FAT LAYER EXPOSED (HCC): Primary | ICD-10-CM

## 2022-12-13 DIAGNOSIS — E11.621 DIABETIC ULCER OF TOE OF LEFT FOOT ASSOCIATED WITH TYPE 2 DIABETES MELLITUS, WITH FAT LAYER EXPOSED (HCC): ICD-10-CM

## 2022-12-13 DIAGNOSIS — L97.522 DIABETIC ULCER OF TOE OF LEFT FOOT ASSOCIATED WITH TYPE 2 DIABETES MELLITUS, WITH FAT LAYER EXPOSED (HCC): ICD-10-CM

## 2022-12-13 DIAGNOSIS — Z89.432 PARTIAL NONTRAUMATIC AMPUTATION OF FOOT, LEFT (HCC): ICD-10-CM

## 2022-12-13 DIAGNOSIS — L03.116 CELLULITIS OF LEFT FOOT: ICD-10-CM

## 2022-12-13 DIAGNOSIS — E11.621 DIABETIC ULCER OF LEFT MIDFOOT ASSOCIATED WITH TYPE 2 DIABETES MELLITUS, WITH FAT LAYER EXPOSED (HCC): Primary | ICD-10-CM

## 2022-12-13 PROCEDURE — 6370000000 HC RX 637 (ALT 250 FOR IP): Performed by: NURSE PRACTITIONER

## 2022-12-13 PROCEDURE — 11042 DBRDMT SUBQ TIS 1ST 20SQCM/<: CPT

## 2022-12-13 PROCEDURE — 11042 DBRDMT SUBQ TIS 1ST 20SQCM/<: CPT | Performed by: NURSE PRACTITIONER

## 2022-12-13 PROCEDURE — 97605 NEG PRS WND THER DME<=50SQCM: CPT

## 2022-12-13 RX ORDER — CLOBETASOL PROPIONATE 0.5 MG/G
OINTMENT TOPICAL ONCE
OUTPATIENT
Start: 2022-12-13 | End: 2022-12-13

## 2022-12-13 RX ORDER — LIDOCAINE HYDROCHLORIDE 20 MG/ML
JELLY TOPICAL ONCE
OUTPATIENT
Start: 2022-12-13 | End: 2022-12-13

## 2022-12-13 RX ORDER — LIDOCAINE 50 MG/G
OINTMENT TOPICAL ONCE
OUTPATIENT
Start: 2022-12-13 | End: 2022-12-13

## 2022-12-13 RX ORDER — BETAMETHASONE DIPROPIONATE 0.05 %
OINTMENT (GRAM) TOPICAL ONCE
OUTPATIENT
Start: 2022-12-13 | End: 2022-12-13

## 2022-12-13 RX ORDER — BACITRACIN ZINC AND POLYMYXIN B SULFATE 500; 1000 [USP'U]/G; [USP'U]/G
OINTMENT TOPICAL ONCE
OUTPATIENT
Start: 2022-12-13 | End: 2022-12-13

## 2022-12-13 RX ORDER — GENTAMICIN SULFATE 1 MG/G
OINTMENT TOPICAL ONCE
OUTPATIENT
Start: 2022-12-13 | End: 2022-12-13

## 2022-12-13 RX ORDER — LIDOCAINE 50 MG/G
OINTMENT TOPICAL ONCE
Status: COMPLETED | OUTPATIENT
Start: 2022-12-13 | End: 2022-12-13

## 2022-12-13 RX ORDER — BACITRACIN, NEOMYCIN, POLYMYXIN B 400; 3.5; 5 [USP'U]/G; MG/G; [USP'U]/G
OINTMENT TOPICAL ONCE
OUTPATIENT
Start: 2022-12-13 | End: 2022-12-13

## 2022-12-13 RX ORDER — GINSENG 100 MG
CAPSULE ORAL ONCE
OUTPATIENT
Start: 2022-12-13 | End: 2022-12-13

## 2022-12-13 RX ORDER — LIDOCAINE 40 MG/G
CREAM TOPICAL ONCE
OUTPATIENT
Start: 2022-12-13 | End: 2022-12-13

## 2022-12-13 RX ORDER — LIDOCAINE HYDROCHLORIDE 40 MG/ML
SOLUTION TOPICAL ONCE
OUTPATIENT
Start: 2022-12-13 | End: 2022-12-13

## 2022-12-13 RX ADMIN — LIDOCAINE: 50 OINTMENT TOPICAL at 09:26

## 2022-12-13 ASSESSMENT — PAIN SCALES - GENERAL: PAINLEVEL_OUTOF10: 0

## 2022-12-13 NOTE — CARE COORDINATION
Phone call to Pt to follow up regarding housing and mental health services. Pt reported he has called several apartments on the list this SW sent to him, but has not been able to reach anyone. SW did provide names of two locations that have responded to this SW in the past to include [address] and [facility] [place] [facility]. SW did provide Pt with contact information for those apartments. Pt reported his wound care nurse stated that she knows of patients who are in [place] and have a woundvac. SW discussed plan to contact Antionette Johnson with [facility] to discuss trying other AL facility options. Pt was in agreement. SW followed up with Pt regarding mental health services. Pt reported his PCP was able to prescribe the psychiatric medications for a 30 day refill and this will be addressed at his upcoming 12/23 appointment with her. Pt reported he believes the medication is helping him to sleep at night. Pt did report he had a fall on 12/12, stating he was doing laundry and the woundvac line got caught and he fell. Pt reported he did have some bleeding, but woundcare physician was not concerned. Pt did report he will have the woundvac for about 2-3 more month, while stating he is told the wound is healing. SW offered to re-refer Pt to [facility] stating they could assist in trying to find an aide to provide homemaker care. Reviewed with pt eligibility criteria of needing assistance with 2 ADL's. Pt denied needing assistance with ADL's, but did report he could benefit from assistance with completing his laundry, therefore Pt declined referral to [facility]. Attempted phone call to Antionette Johnson with [address] waiver through [facility]. No answer, voicemail message left requesting a return call to discuss AL waiver, contact number provided. SW will follow up with pt in one week on 12/20 regarding housing / AL.

## 2022-12-13 NOTE — PROGRESS NOTES
Multilayer Compression Wrap   (Not Unna) Below the Knee    NAME:  Trevor Puente OF BIRTH:  1980  MEDICAL RECORD NUMBER:  [id number]  DATE:  12/13/2022    Multilayer compression wrap: Removed old Multilayer wrap if indicated and wash leg with mild soap/water. Applied moisturizing agent to dry skin as needed. Applied primary and secondary dressing as ordered. Applied multilayered dressing below the knee to left lower leg. Instructed patient/caregiver not to remove dressing and to keep it clean and dry. Instructed patient/caregiver on complications to report to provider, such as pain, numbness in toes, heavy drainage, and slippage of dressing. Instructed patient on purpose of compression dressing and on activity and exercise recommendations. Negative Pressure Wound Therapy    NAME:  Trevor Puente OF BIRTH:  1980  MEDICAL RECORD NUMBER:  [id number]  DATE:  12/13/2022    Applied Negative Pressure to right amp site. [x] Applied skin barrier prep to serenity-wound. [x] Cut strips of plastic drape to picture frame wound so that serenity-wound is     covered with the drape. [x] If bridging dressing to less prominent site, cover any intact skin that will come in contact with the Negative Pressure Therapy sponge, gauze or channel drain with plastic drape. The sponge should never touch intact skin. [x] Cut sponge, gauze or channel drain to size which will fit into the wound/ulcer bed without being forced. [x] Be sure the sponge is large enough to hold the entire round plastic flange which is attached to the tubing. Never allow flange to be larger than the sponge or it will produce suction damaging intact skin. Total number of individual pieces of foam used within the wound bed: 3    [x] If bridging the dressing away from the primary site, be sure the bridge leads to a piece of sponge large enough to hold the entire flange without allowing any of the flange to overlap onto intact skin.

## 2022-12-13 NOTE — DISCHARGE INSTRUCTIONS
PHYSICIAN ORDERS AND DISCHARGE INSTRUCTIONS     NOTE: Upon discharge from the [address], you will receive a patient experience survey. We would be grateful if you would take the time to fill this survey out. Wound cleansing:              Do not scrub or use excessive force. Wash hands with soap and water before and after dressing changes. Prior to applying a clean dressing, cleanse wound with normal saline, wound cleanser, or mild soap and water. Ask the physician or nurse before getting the wound(s) wet in a shower     Daily Wound management:              Keep weight off wounds and reposition every 2 hours. Avoid standing for long periods of time. Apply wraps/stockings in AM and remove at bedtime. If swelling is present, elevate legs to the level of the heart or above for 30 minutes 4-5 times a day and/or when sitting. When taking antibiotics take entire prescription as ordered by physician do not stop taking until medicine is all gone. Wound Care Notes:  Rx: Rajni King  Apply for grafts 05/11/22: aLL GRAFTS approved 06/08/22 for Left Toe amp site  Reapply for graft 08/22/22 for Left toes amp site   YADY's   Right 1.07      Left    1.1           Date: 08/15/22   Home Care Discontinued. Needs Nurse visit         Grafts Left Foot Amp Site  Puraply #1 Graft #1 4x4 fenestrated 06/15/22   Puraply #2 Graft #2 4x4 fenestrated 06/22/22                                    Orders for this week: 12/13/22     FAX ORDERS TO [facility]! Left Plantar Foot wound -- Clean with soap and water, pat dry. Qwick to periwound. Apply anasept gel, stimulen powder and ioplex to wound beds. Cover with Sorbex and ABD. Wrap with Coban 2 Lite. Leave in place for 1 week.   ((((HOME CARE - PLEASE DO NOT REMOVE THE LEFT LEG DRESSING))))        WOUND VAC THERAPY: Right Leg Amp Site  DUODERM TO PERIWOUND FOR PROTECTION. APPLY SORBACT OVER BONE, STIMULEN and anasept spray TO WOUND BED AND BLACK FOAM TO WOUND BED (be sure to tuck foam into deeper area on lateral aspect of wound). SECURE VAC DRESSING WITH DRAPE. SET WOUND VAC  CONTINUOUS SUCTION. CANISTER CHANGE WEEKLY OR ACCORDING TO VOLUME OF DRAINAGE. Wrap with ACE wrap, but do not compress tubing against skin. Home Care to change vac on . WOUND CARE CENTER WILL CHANGE ON TUESDAY (IF VAC SUPPLIES SENT - NEED CANISTER AND BLACK FOAM DRESSING KIT). Follow Up Instructions: At the [address] in 1 week: Tuesday   Primary Wound Care Provider: Citlaly Bernard CNP   Call  for any questions or concerns.   Central [phone]

## 2022-12-13 NOTE — CARE COORDINATION
Ambulatory Care Coordination Note      ACC: Debbie Barriga, RN    Call to pt for acm f/u. Denies needs at this time. Reviewed upcoming cp ov with pt. Pt reports he is unsure if has medicaid or not. ACM noted it on chart but reports he does not have card and is unaware if he has coverage. ACM curious about transpo coverage/Medicaid? Call ended at this time as pt currently at wound care. Will f/u with  as to pt medicaid coverage. Lab Results       None            Care Coordination Interventions    Referral from Primary Care Provider: No  Suggested Interventions and Community Resources  Fall Risk Prevention: In Process  Home Care Waiver: In Process  Home Health Services: Completed  Registered Dietician: In Process  Social Work: Completed  Zone Management Tools: Completed          Goals Addressed                   This Visit's Progress     Conditions and Symptoms   On track     I will schedule office visits, as directed by my provider. I will keep my appointment or reschedule if I have to cancel. I will notify my provider of any barriers to my plan of care. I will follow my Zone Management tool to seek urgent or emergent care. I will notify my provider of any symptoms that indicate a worsening of my condition. Barriers: overwhelmed by complexity of regimen  Plan for overcoming my barriers: Patient will utilize zone management tool to support healthy BS range. Confidence:  6/10  Anticipated Goal Completion Date:  10/28/22    7/28/22:  Requested referral for Letty Garvey RN to support diabetic management. Prior to Admission medications    Medication Sig Start Date End Date Taking?  Authorizing Provider   aspirin 81 MG chewable tablet Take 1 tablet by mouth daily Take 81 mg by mouth daily 12/9/22   PERCY Pizarro   prazosin (MINIPRESS) 1 MG capsule Take 1 capsule by mouth nightly 12/7/22   RAFAEL Kaur - CNP   QUEtiapine (SEROQUEL) 200 MG tablet Take 1 tablet by mouth 2 times daily 12/7/22   RAFAEL Alvarez CNP   carvedilol (COREG) 12.5 MG tablet Take 1 tablet by mouth 2 times daily (with meals) 11/17/22   RAFAEL Alvarez CNP   losartan (COZAAR) 100 MG tablet Take 1 tablet by mouth daily 11/17/22   RAFAEL Alvarez CNP   clopidogrel (PLAVIX) 75 MG tablet Take 1 tablet by mouth daily 11/17/22   RAFAEL Alvarez CNP   pantoprazole (PROTONIX) 20 MG tablet Take 1 tablet by mouth every morning (before breakfast) 11/17/22   RAFAEL Alvarez CNP   omeprazole (PRILOSEC) 20 MG delayed release capsule Take 1 capsule by mouth once daily in the morning 11/17/22   RAFAEL Alvarez CNP   FLUoxetine (PROZAC) 40 MG capsule Take 2 capsules by mouth daily 11/17/22 12/17/22  RAFAEL Alvarez CNP   insulin glargine (LANTUS SOLOSTAR) 100 UNIT/ML injection pen Inject 30 Units into the skin nightly 11/17/22 2/15/23  RAFAEL Alvarez CNP   atorvastatin (LIPITOR) 40 MG tablet Take 1 tablet by mouth nightly 11/17/22 12/17/22  RAFAEL Alvarez CNP   hydrOXYzine pamoate (VISTARIL) 50 MG capsule Take 50 mg by mouth every 6 hours as needed for Anxiety    Mila Sosa   ondansetron (ZOFRAN) 4 MG tablet Take 1 tablet by mouth daily as needed for Nausea or Vomiting 11/11/22   RAFAEL Alvarez CNP   pantoprazole (PROTONIX) 40 MG tablet  9/2/22   Historical Provider, MD   docusate sodium (COLACE, DULCOLAX) 100 MG CAPS Take 100 mg by mouth daily  Patient not taking: No sig reported 9/17/22   Fadi Tillman MD   metFORMIN (GLUCOPHAGE) 500 MG tablet Take 2 tablets by mouth 2 times daily (with meals) Indications: Start tomorrow 03/14 9/2/22 9/16/22  Tristin Orr MD   metoclopramide (REGLAN) 10 MG tablet Take 1 tablet by mouth 4 times daily (before meals and nightly) 8/29/22 9/2/22  RAFAEL Morton CNP   sucralfate (CARAFATE) 1 GM/10ML suspension Take 10 mLs by mouth 4 times daily for 7 days  Patient not taking: Reported on 11/11/2022 8/18/22 9/26/22  Neville Falk, RAFAEL - CNP   glyBURIDE (DIABETA) 5 MG tablet Take 2 tablets by mouth in the morning and at bedtime  Patient not taking: No sig reported 8/1/22   Garwin Homans, APRN - CNP   Dulaglutide 1.5 MG/0.5ML SOPN Inject 1.5 mg into the skin once a week  Patient not taking: No sig reported 8/1/22   Garwin Homans, APRN - CNP   Alcohol Swabs PADS  4/27/22   Historical Provider, MD   blood glucose monitor strips Test 2 times a day & as needed for symptoms of irregular blood glucose. Dispense sufficient amount for indicated testing frequency plus additional to accommodate PRN testing needs. Patient not taking: No sig reported 4/27/22   Marty Adams MD   pioglitazone (ACTOS) 30 MG tablet Take 1 tablet by mouth daily 4/27/22 9/16/22  Marty Adams MD   blood glucose monitor kit and supplies Dispense sufficient amount for indicated testing frequency plus additional to accommodate PRN testing needs. Dispense all needed supplies to include: monitor, strips, lancing device, lancets, control solutions, alcohol swabs.   Patient not taking: No sig reported 6/15/20   Ester Vigil MD   FreeStyle Lancets MISC 1 each by Does not apply route daily  Patient not taking: No sig reported 6/15/20   Ester Vigil MD   acetaminophen (TYLENOL) 325 MG tablet Take 2 tablets by mouth every 4 hours as needed for Pain or Fever 2/28/18 9/2/22  Samantha Hughes MD       Future Appointments   Date Time Provider Rosario Nesbitt   12/20/2022  9:15 AM RAFAEL Schaeffer CNP [facility]   12/23/2022  2:30 PM Garwin Homans, APRN - CNP [facility]   1/4/2023 10:00 AM Jorge Hui PSYD [facility]    and   General Assessment    Do you have any symptoms that are causing concern?: No

## 2022-12-13 NOTE — PROGRESS NOTES
Wound Care Center Progress Note With Procedure    Anny Fraser  AGE: 43 y.o. GENDER: male  : 1980  EPISODE DATE:  2022     Subjective:     Chief Complaint   Patient presents with    Wound Check     ble         HISTORY of PRESENT ILLNESS      Anny Fraser is a 43 y.o. male who presents today for wound evaluation of Chronic diabetic, pressure and non-healing surgical ulcer(s) of the left medial foot and lateral plantar surface. The ulcer is of marked severity. The underlying cause of the wound is diabetes, non-healing surgical. He presents today with a new wound to the right plantar foot that is diabetic and pressure in nature and of moderate severity. The patient has significant underlying medical conditions as below. The patient is having significant depression related to the recent and sudden death of his mother. 10/20/22: The patient was here last 10/4/22 and his right BKA site was well approximated, staples removed and steri strips. The prosthetic supplier was with him and plans had started to get fitted. Unfortunately, the patient fell the same night at home and the wound dehisced. He was evaluated at the ED. The patient then was not seen at the wound clinic for a few weeks as he was hospitalized for suicidal ideation. Today able to probe and visibly see bone. Home Health still in place, will apply for a wound vac. Will also start Bactrim. 22: Since his last visit to the wound clinic 22 the patient was hospitalized -22) at [facility] with necrotizing fascitis right lower leg with resulting right BKA. 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 Gilma Hillman. She placed a wound VAC to on the stump at that time. On 2022 Dr. Sendy Miranda performed a primary closure of his right BKA stump.     Wound Pain Timing/Severity: none  Quality of pain: N/A  Severity of pain:  0 / 10   Modifying Factors: diabetes and chronic pressure  Associated Signs/Symptoms: drainage     Diabetes: Yes, on an oral and insulin regimen, last A1c 10.5 as of 7/28/22  Diabetes education provided today:    Diabetes pathoetiology, difference between type 1 and type 2 diabetes, and progressive nature of Type 2 DM. Diabetic Neuropathy: signs and therapy. Foot care: advised to wash feet daily, pat dry and apply lotion at night, avoiding between toes. Need to look at feet daily and report to a physician any signs of inflammation or skin damage. Discussed diabetes shoes and socks. Diabetic management related to wound care    Smoking: Never smoker  Obesity:No  Anticoagulant therapy: No  Immunosuppression: No    Patient educated on the 6 essential components necessary for wound healing: Circulation, Debridements, Proper Dressings and Topical Wound Products, Infection Control, Edema Control and Offloading. Patient educated on those factors that negatively effect or impact wound healing: smoking, obesity, uncontrolled diabetes, anticoagulant and immunosuppressive regimens, inadequate nutrition, untreated arterial and venous disease if applicable and measures to manage edema. Nutritional status: well nourished. Discussed need for increased protein and calories for wound healing and good sources of protein (just over 7 grams for every 20 pounds of body weight). Animal-based foods high in protein (meat, poultry, fish, eggs, and dairy foods). Plant based foods high in protein (tofu, lentils, beans, chickpeas, nuts, quinoa and den seeds. Off Loading  Offloading or minimizing or removing weight placed on an area with poor circulation such as diabetic wounds or pressure.  This can be achieved with crutches, wheel chair, knee walker etc. Minimizing pressure through partial weight bearing (minimizing the amount of  pressure applied and or the amount of time on the area of pressure) or maintaining a non-weight bearing status can be used to promote and often can be essential for thee wound to heal. Off loading may also need to be achieved for non-weight bearing wounds such as pressure ulcers to the torso. Turning and changing positions frequently, at least every two hours. Use of pressure cushion if sitting up in chair. Skin Care  Keep skin clean and well moisturized , moisturize routinely with ointments for heavier moisturizer needs for extremely dry skin or cracks such as A&D ointment and lotions for a light moisturizer such as CeraVe or Eucerin. If incontinent change incontinence garments as soon as soiled and keeping skin clean and use barrier cream to protect the skin.         PAST MEDICAL HISTORY        Diagnosis Date    Abscess of left foot 4/22/2022    Anemia associated with acute blood loss 4/26/2022    Callus of foot     Right foot - see's Dr. Farhad Abbott    Cellulitis of left foot 4/22/2022    Diabetic ulcer of left midfoot associated with type 2 diabetes mellitus, with muscle involvement without evidence of necrosis ([address] 4/26/2022    Diabetic ulcer of left midfoot associated with type 2 diabetes mellitus, with necrosis of muscle ([address] 6/7/2021    Diabetic ulcer of right midfoot associated with type 2 diabetes mellitus, with fat layer exposed ([address] 8/11/2020    Dizziness     positional    Essential hypertension     Follows with PCP    Hyperlipidemia     Lumbar radiculopathy     Septic embolism ([address] 6/13/2020    Subacute osteomyelitis of left foot ([address] 4/22/2022       PAST SURGICAL HISTORY    Past Surgical History:   Procedure Laterality Date    ACHILLES TENDON SURGERY Right 1/8/2021    RIGHT ACHILLES TENDON LENGTHENING REPAIR performed by Joy Sumner DPM at [facility]  03/2018    [address] [place] [address] 51    DENTAL SURGERY      teeth extractions -half per patient    HERNIA REPAIR Bilateral 5/27/2020    BIALTERAL HERNIA INGUINAL REPAIR performed by Tom Goldberg MD at [facility] Right 9/2/2022    LEG AMPUTATION BELOW KNEE performed by Dipti Dueñas MD at [address] Right 9/5/2022    LEG AMPUTATION BELOW KNEE REVISION performed by Dipti Duñeas MD at [facility] 2018    ID OFFICE/OUTPT VISIT,PROCEDURE ONLY Left 4/17/2018    L5-S1 HEMILAMINECTOMY, REMOVAL OF [address] LEFT SIDE performed by Shakir Carrion MD at [address] Right 1/8/2021    RIGHT TRANSMETATARSAL TOE AMPUTATION performed by Cassius Denny DPM at [facility] OR    TOE AMPUTATION Left 4/23/2022    LEFT RAY GREAT TOE AMPUTATION performed by Gordo Beltran MD at [place] [address]    Family History   Problem Relation Age of Onset    Heart Disease Father     Diabetes Father     Diabetes Mother     Heart Disease Mother     Other Mother     Kidney Disease Mother     Heart Attack Mother        SOCIAL HISTORY    Social History     Tobacco Use    Smoking status: Never    Smokeless tobacco: Never   Vaping Use    Vaping Use: Never used   Substance Use Topics    Alcohol use: Yes     Comment: occasionally    Drug use: No       ALLERGIES    No Known Allergies    MEDICATIONS    Current Outpatient Medications on File Prior to Encounter   Medication Sig Dispense Refill    aspirin 81 MG chewable tablet Take 1 tablet by mouth daily Take 81 mg by mouth daily 30 tablet 2    prazosin (MINIPRESS) 1 MG capsule Take 1 capsule by mouth nightly 30 capsule 0    QUEtiapine (SEROQUEL) 200 MG tablet Take 1 tablet by mouth 2 times daily 60 tablet 0    carvedilol (COREG) 12.5 MG tablet Take 1 tablet by mouth 2 times daily (with meals) 60 tablet 0    losartan (COZAAR) 100 MG tablet Take 1 tablet by mouth daily 30 tablet 0    clopidogrel (PLAVIX) 75 MG tablet Take 1 tablet by mouth daily 30 tablet 0    pantoprazole (PROTONIX) 20 MG tablet Take 1 tablet by mouth every morning (before breakfast) 30 tablet 0    omeprazole (PRILOSEC) 20 MG delayed release capsule Take 1 capsule by mouth once daily in the morning 30 capsule 0    FLUoxetine (PROZAC) 40 MG capsule Take 2 capsules by mouth daily 30 capsule 0    insulin glargine (LANTUS SOLOSTAR) 100 UNIT/ML injection pen Inject 30 Units into the skin nightly 9 mL 0    atorvastatin (LIPITOR) 40 MG tablet Take 1 tablet by mouth nightly 30 tablet 0    hydrOXYzine pamoate (VISTARIL) 50 MG capsule Take 50 mg by mouth every 6 hours as needed for Anxiety      ondansetron (ZOFRAN) 4 MG tablet Take 1 tablet by mouth daily as needed for Nausea or Vomiting 30 tablet 0    pantoprazole (PROTONIX) 40 MG tablet       docusate sodium (COLACE, DULCOLAX) 100 MG CAPS Take 100 mg by mouth daily (Patient not taking: No sig reported)      [DISCONTINUED] metFORMIN (GLUCOPHAGE) 500 MG tablet Take 2 tablets by mouth 2 times daily (with meals) Indications: Start tomorrow 03/14 360 tablet 1    [DISCONTINUED] metoclopramide (REGLAN) 10 MG tablet Take 1 tablet by mouth 4 times daily (before meals and nightly) 120 tablet 3    sucralfate (CARAFATE) 1 GM/10ML suspension Take 10 mLs by mouth 4 times daily for 7 days (Patient not taking: Reported on 11/11/2022) 414 mL 0    glyBURIDE (DIABETA) 5 MG tablet Take 2 tablets by mouth in the morning and at bedtime (Patient not taking: No sig reported) 120 tablet 1    Dulaglutide 1.5 MG/0.5ML SOPN Inject 1.5 mg into the skin once a week (Patient not taking: No sig reported) 5 pen 3    Alcohol Swabs PADS       blood glucose monitor strips Test 2 times a day & as needed for symptoms of irregular blood glucose. Dispense sufficient amount for indicated testing frequency plus additional to accommodate PRN testing needs. (Patient not taking: No sig reported) 100 strip 0    [DISCONTINUED] pioglitazone (ACTOS) 30 MG tablet Take 1 tablet by mouth daily 90 tablet 1    blood glucose monitor kit and supplies Dispense sufficient amount for indicated testing frequency plus additional to accommodate PRN testing needs.  Dispense all needed supplies to include: monitor, strips, the extent of previous healing. Performed by: RAFAEL Hernandez CNP    Consent obtained: Yes    Time out taken:  Yes    Pain Control: Anesthetic  Anesthetic: 4% Lidocaine Liquid Topical        Debridement:Excisional Debridement    Using curette the wound(s) was/were sharply debrided down through and including the removal of muscle/fascia. Devitalized Tissue Debrided:  fibrin, biofilm, slough, necrotic/eschar, and exudate    Pre Debridement Measurements:  Are located in the Wound Documentation Flow Sheet    All active wounds listed below with today's date are evaluated  Wound(s)    debrided this date include # : 3     Post  Debridement Measurements:  Negative Pressure Wound Therapy Leg Anterior; Lower;Proximal;Right (Active)   Unit Type KCI 12/06/22 1007   Dressing Type Black Foam 12/06/22 1007   Number of pieces used 3 12/06/22 1007   Number of pieces removed 1 12/06/22 0939   Cycle Continuous 12/06/22 1007   Target Pressure (mmHg) 125 12/06/22 1007   Intensity 5 12/06/22 1007   Canister changed? Yes 12/06/22 1007   Dressing Status New dressing applied 12/06/22 1007   Dressing Changed Changed/New 12/06/22 1007   Drainage Amount Moderate 12/06/22 1007   Drainage Description Serosanguinous 12/06/22 1007   Dressing Change Due 12/06/22 12/06/22 1007   Wound Assessment Granulation tissue 11/29/22 1015   Rosalie-wound Assessment Intact;Fragile 11/29/22 1015   Odor None 11/29/22 1015   Number of days: 48       Wound 05/11/22 #2 Left Plantar  (onset unknown) (Active)   Wound Image   12/13/22 0917   Wound Etiology Diabetic 12/13/22 0917   Dressing Status New dressing applied;Clean;Dry; Intact 11/29/22 0958   Wound Cleansed Wound cleanser 12/13/22 0917   Offloading for Diabetic Foot Ulcers Diabetic shoes/inserts 12/13/22 0917   Wound Length (cm) 0 cm 12/13/22 0917   Wound Width (cm) 0 cm 12/13/22 0917   Wound Depth (cm) 0 cm 12/13/22 0917   Wound Surface Area (cm^2) 0 cm^2 12/13/22 0917   Change in Wound Size % (l*w) 100 12/13/22 0917   Wound Volume (cm^3) 0 cm^3 12/13/22 0917   Wound Healing % 100 12/13/22 0917   Post-Procedure Length (cm) 0.2 cm 12/13/22 0948   Post-Procedure Width (cm) 0.2 cm 12/13/22 0948   Post-Procedure Depth (cm) 0.1 cm 12/13/22 0948   Post-Procedure Surface Area (cm^2) 0.04 cm^2 12/13/22 0948   Post-Procedure Volume (cm^3) 0.004 cm^3 12/13/22 0948   Distance Tunneling (cm) 0 cm 12/13/22 0917   Tunneling Position ___ O'Clock 0 12/13/22 0917   Undermining Starts ___ O'Clock 0 12/13/22 0917   Undermining Ends___ O'Clock 0 12/13/22 0917   Undermining Maxium Distance (cm) 0 12/13/22 0917   Wound Assessment Granulation tissue 12/13/22 0948   Drainage Amount Small 12/13/22 0948   Drainage Description Sanguinous 12/13/22 0948   Odor None 12/13/22 0917   Rosalie-wound Assessment Hyperkeratosis (callous) 12/13/22 0917   Margins Attached edges 12/13/22 0917   Wound Thickness Description not for Pressure Injury Full thickness 12/13/22 0948   Number of days: 216       Wound 10/20/22 #3 right amp site. (Active)   Wound Image   12/13/22 0917   Wound Etiology Non-Healing Surgical 12/13/22 3132   Dressing Status New dressing applied;Clean;Dry; Intact 11/29/22 0958   Wound Cleansed Wound cleanser 12/13/22 0917   Offloading for Diabetic Foot Ulcers Other (comment) 12/13/22 0917   Wound Length (cm) 0.9 cm 12/13/22 0917   Wound Width (cm) 6.3 cm 12/13/22 0917   Wound Depth (cm) 1.3 cm 12/13/22 0917   Wound Surface Area (cm^2) 5.67 cm^2 12/13/22 0917   Change in Wound Size % (l*w) -1.25 12/13/22 0917   Wound Volume (cm^3) 7.371 cm^3 12/13/22 0917   Wound Healing % -229 12/13/22 0917   Post-Procedure Length (cm) 0.9 cm 12/13/22 0948   Post-Procedure Width (cm) 6.3 cm 12/13/22 0948   Post-Procedure Depth (cm) 1 cm 12/13/22 0948   Post-Procedure Surface Area (cm^2) 5.67 cm^2 12/13/22 0948   Post-Procedure Volume (cm^3) 5.67 cm^3 12/13/22 0948   Distance Tunneling (cm) 0 cm 12/13/22 7435   Tunneling Position ___ O'Clock 0 12/13/22 0917   Undermining Starts ___ O'Clock 0 12/13/22 0917   Undermining Ends___ O'Clock 0 12/13/22 0917   Undermining Maxium Distance (cm) 00 12/13/22 0917   Wound Assessment Pink/red;Slough 12/13/22 0917   Drainage Amount Moderate 12/13/22 0917   Drainage Description Yellow;Serosanguinous 12/13/22 0917   Odor None 12/13/22 0917   Rosalie-wound Assessment Blanchable erythema; Maceration 12/13/22 0917   Margins Undefined edges 12/13/22 0917   Wound Thickness Description not for Pressure Injury Full thickness 12/13/22 0917   Number of days: 54     Percent of Wound(s) Debrided: approximately 100%    Total  Area  Debrided: 5.71 sq cm     Bleeding:  Minimal    Hemostasis Achieved:  by pressure    Procedural Pain:  0  / 10     Post Procedural Pain:  0 / 10     Response to treatment:  Well tolerated by patient. Status of wound progress and description from last visit: Improving, regimen as bellow, follow up in one week. The patient continues to orellana depression post his mother's death. Plan:       Discharge Instructions         PHYSICIAN ORDERS AND DISCHARGE INSTRUCTIONS     NOTE: Upon discharge from the [address], you will receive a patient experience survey. We would be grateful if you would take the time to fill this survey out. Wound cleansing:              Do not scrub or use excessive force. Wash hands with soap and water before and after dressing changes. Prior to applying a clean dressing, cleanse wound with normal saline, wound cleanser, or mild soap and water. Ask the physician or nurse before getting the wound(s) wet in a shower     Daily Wound management:              Keep weight off wounds and reposition every 2 hours. Avoid standing for long periods of time. Apply wraps/stockings in AM and remove at bedtime.               If swelling is present, elevate legs to the level of the heart or above for 30 minutes 4-5 times a day and/or when sitting. When taking antibiotics take entire prescription as ordered by physician do not stop taking until medicine is all gone. Wound Care Notes:  Rx: Moses Bella  Apply for grafts 22: aLL GRAFTS approved 22 for Left Toe amp site  Reapply for graft 22 for Left toes amp site   YADY's   Right 1.07      Left    1.1           Date: 08/15/22   Home Care Discontinued. Needs Nurse visit         Grafts Left Foot Amp Site  Puraply #1 Graft #1 4x4 fenestrated 06/15/22   Puraply #2 Graft #2 4x4 fenestrated 22                                    Orders for this week: 22     FAX ORDERS TO [facility]! Left Plantar Foot wound -- Clean with soap and water, pat dry. Qwick to periwound. Apply anasept gel, stimulen powder and ioplex to wound beds. Cover with Sorbex and ABD. Wrap with Coban 2 Lite. Leave in place for 1 week. ((((HOME CARE - PLEASE DO NOT REMOVE THE LEFT LEG DRESSING))))        WOUND VAC THERAPY: Right Leg Amp Site  DUODERM TO PERIWOUND FOR PROTECTION. APPLY SORBACT OVER BONE, STIMULEN and anasept spray TO WOUND BED AND BLACK FOAM TO WOUND BED (be sure to tuck foam into deeper area on lateral aspect of wound). SECURE VAC DRESSING WITH DRAPE. SET WOUND VAC  CONTINUOUS SUCTION. CANISTER CHANGE WEEKLY OR ACCORDING TO VOLUME OF DRAINAGE. Wrap with ACE wrap, but do not compress tubing against skin. Home Care to change vac on . WOUND CARE CENTER WILL CHANGE ON TUESDAY (IF VAC SUPPLIES SENT - NEED CANISTER AND BLACK FOAM DRESSING KIT). Follow Up Instructions: At the [address] in 1 week: Tuesday   Primary Wound Care Provider: Evelyn Bryan CNP   Call  for any questions or concerns.   Central [phone]        Treatment Note      Written Patient Dismissal Instructions Given            Electronically signed by RAFAEL Carr - SERENA on 12/13/2022 at 10:10 AM

## 2022-12-20 ENCOUNTER — HOSPITAL ENCOUNTER (OUTPATIENT)
Dept: WOUND CARE | Age: 42
Discharge: HOME | End: 2022-12-20
Payer: MEDICARE

## 2022-12-20 ENCOUNTER — CARE COORDINATION (OUTPATIENT)
Dept: CARE COORDINATION | Age: 42
End: 2022-12-20

## 2022-12-20 VITALS
HEART RATE: 96 BPM | SYSTOLIC BLOOD PRESSURE: 156 MMHG | DIASTOLIC BLOOD PRESSURE: 99 MMHG | RESPIRATION RATE: 18 BRPM | TEMPERATURE: 96.5 F

## 2022-12-20 DIAGNOSIS — L97.522 DIABETIC ULCER OF TOE OF LEFT FOOT ASSOCIATED WITH TYPE 2 DIABETES MELLITUS, WITH FAT LAYER EXPOSED (HCC): ICD-10-CM

## 2022-12-20 DIAGNOSIS — L03.116 CELLULITIS OF LEFT FOOT: ICD-10-CM

## 2022-12-20 DIAGNOSIS — E11.621 DIABETIC ULCER OF LEFT MIDFOOT ASSOCIATED WITH TYPE 2 DIABETES MELLITUS, WITH FAT LAYER EXPOSED (HCC): Primary | ICD-10-CM

## 2022-12-20 DIAGNOSIS — L97.422 DIABETIC ULCER OF LEFT MIDFOOT ASSOCIATED WITH TYPE 2 DIABETES MELLITUS, WITH FAT LAYER EXPOSED (HCC): Primary | ICD-10-CM

## 2022-12-20 DIAGNOSIS — Z89.432 PARTIAL NONTRAUMATIC AMPUTATION OF FOOT, LEFT (HCC): ICD-10-CM

## 2022-12-20 DIAGNOSIS — E11.621 DIABETIC ULCER OF TOE OF LEFT FOOT ASSOCIATED WITH TYPE 2 DIABETES MELLITUS, WITH FAT LAYER EXPOSED (HCC): ICD-10-CM

## 2022-12-20 PROCEDURE — 6370000000 HC RX 637 (ALT 250 FOR IP): Performed by: NURSE PRACTITIONER

## 2022-12-20 PROCEDURE — 11042 DBRDMT SUBQ TIS 1ST 20SQCM/<: CPT

## 2022-12-20 PROCEDURE — 97605 NEG PRS WND THER DME<=50SQCM: CPT

## 2022-12-20 PROCEDURE — 11042 DBRDMT SUBQ TIS 1ST 20SQCM/<: CPT | Performed by: NURSE PRACTITIONER

## 2022-12-20 RX ORDER — GINSENG 100 MG
CAPSULE ORAL ONCE
OUTPATIENT
Start: 2022-12-20 | End: 2022-12-20

## 2022-12-20 RX ORDER — LIDOCAINE HYDROCHLORIDE 40 MG/ML
SOLUTION TOPICAL ONCE
OUTPATIENT
Start: 2022-12-20 | End: 2022-12-20

## 2022-12-20 RX ORDER — LIDOCAINE 50 MG/G
OINTMENT TOPICAL ONCE
OUTPATIENT
Start: 2022-12-20 | End: 2022-12-20

## 2022-12-20 RX ORDER — GENTAMICIN SULFATE 1 MG/G
OINTMENT TOPICAL ONCE
OUTPATIENT
Start: 2022-12-20 | End: 2022-12-20

## 2022-12-20 RX ORDER — BACITRACIN ZINC AND POLYMYXIN B SULFATE 500; 1000 [USP'U]/G; [USP'U]/G
OINTMENT TOPICAL ONCE
OUTPATIENT
Start: 2022-12-20 | End: 2022-12-20

## 2022-12-20 RX ORDER — BACITRACIN, NEOMYCIN, POLYMYXIN B 400; 3.5; 5 [USP'U]/G; MG/G; [USP'U]/G
OINTMENT TOPICAL ONCE
OUTPATIENT
Start: 2022-12-20 | End: 2022-12-20

## 2022-12-20 RX ORDER — CLOBETASOL PROPIONATE 0.5 MG/G
OINTMENT TOPICAL ONCE
OUTPATIENT
Start: 2022-12-20 | End: 2022-12-20

## 2022-12-20 RX ORDER — LIDOCAINE 40 MG/G
CREAM TOPICAL ONCE
OUTPATIENT
Start: 2022-12-20 | End: 2022-12-20

## 2022-12-20 RX ORDER — BETAMETHASONE DIPROPIONATE 0.05 %
OINTMENT (GRAM) TOPICAL ONCE
OUTPATIENT
Start: 2022-12-20 | End: 2022-12-20

## 2022-12-20 RX ORDER — LIDOCAINE HYDROCHLORIDE 20 MG/ML
JELLY TOPICAL ONCE
OUTPATIENT
Start: 2022-12-20 | End: 2022-12-20

## 2022-12-20 RX ORDER — LIDOCAINE 50 MG/G
OINTMENT TOPICAL ONCE
Status: COMPLETED | OUTPATIENT
Start: 2022-12-20 | End: 2022-12-20

## 2022-12-20 RX ADMIN — LIDOCAINE: 50 OINTMENT TOPICAL at 08:57

## 2022-12-20 ASSESSMENT — PAIN DESCRIPTION - PAIN TYPE: TYPE: SURGICAL PAIN

## 2022-12-20 ASSESSMENT — PAIN DESCRIPTION - FREQUENCY: FREQUENCY: INTERMITTENT

## 2022-12-20 ASSESSMENT — PAIN DESCRIPTION - ONSET: ONSET: ON-GOING

## 2022-12-20 ASSESSMENT — PAIN DESCRIPTION - DESCRIPTORS: DESCRIPTORS: ITCHING;STABBING

## 2022-12-20 ASSESSMENT — PAIN DESCRIPTION - ORIENTATION: ORIENTATION: RIGHT

## 2022-12-20 ASSESSMENT — PAIN - FUNCTIONAL ASSESSMENT: PAIN_FUNCTIONAL_ASSESSMENT: PREVENTS OR INTERFERES SOME ACTIVE ACTIVITIES AND ADLS

## 2022-12-20 ASSESSMENT — PAIN SCALES - GENERAL: PAINLEVEL_OUTOF10: 3

## 2022-12-20 NOTE — PROGRESS NOTES
Wound Care Center Progress Note With Procedure    Brant Roberson  AGE: 43 y.o. GENDER: male  : 1980  EPISODE DATE:  2022     Subjective:     Chief Complaint   Patient presents with    Wound Check     Bilateral Feet         HISTORY of PRESENT ILLNESS      Brant Roberson is a 43 y.o. male who presents today for wound evaluation of Chronic diabetic, pressure and non-healing surgical ulcer(s) of the left medial foot and lateral plantar surface. The ulcer is of marked severity. The underlying cause of the wound is diabetes, non-healing surgical. He presents today with a new wound to the right plantar foot that is diabetic and pressure in nature and of moderate severity. The patient has significant underlying medical conditions as below. The patient is having significant depression related to the recent and sudden death of his mother. 10/20/22: The patient was here last 10/4/22 and his right BKA site was well approximated, staples removed and steri strips. The prosthetic supplier was with him and plans had started to get fitted. Unfortunately, the patient fell the same night at home and the wound dehisced. He was evaluated at the ED. The patient then was not seen at the wound clinic for a few weeks as he was hospitalized for suicidal ideation. Today able to probe and visibly see bone. Home Health still in place, will apply for a wound vac. Will also start Bactrim. 22: Since his last visit to the wound clinic 22 the patient was hospitalized -22) at [facility] with necrotizing fascitis right lower leg with resulting right BKA. 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 Isaiah Rebolledo. She placed a wound VAC to on the stump at that time. On 2022 Dr. Lenore Wright performed a primary closure of his right BKA stump.     Wound Pain Timing/Severity: none  Quality of pain: N/A  Severity of pain:  0 / 10   Modifying Factors: diabetes and chronic pressure  Associated Signs/Symptoms: drainage     Diabetes: Yes, on an oral and insulin regimen, last A1c 10.5 as of 7/28/22  Diabetes education provided today:    Diabetes pathoetiology, difference between type 1 and type 2 diabetes, and progressive nature of Type 2 DM. Diabetic Neuropathy: signs and therapy. Foot care: advised to wash feet daily, pat dry and apply lotion at night, avoiding between toes. Need to look at feet daily and report to a physician any signs of inflammation or skin damage. Discussed diabetes shoes and socks. Diabetic management related to wound care    Smoking: Never smoker  Obesity:No  Anticoagulant therapy: No  Immunosuppression: No    Patient educated on the 6 essential components necessary for wound healing: Circulation, Debridements, Proper Dressings and Topical Wound Products, Infection Control, Edema Control and Offloading. Patient educated on those factors that negatively effect or impact wound healing: smoking, obesity, uncontrolled diabetes, anticoagulant and immunosuppressive regimens, inadequate nutrition, untreated arterial and venous disease if applicable and measures to manage edema. Nutritional status: well nourished. Discussed need for increased protein and calories for wound healing and good sources of protein (just over 7 grams for every 20 pounds of body weight). Animal-based foods high in protein (meat, poultry, fish, eggs, and dairy foods). Plant based foods high in protein (tofu, lentils, beans, chickpeas, nuts, quinoa and den seeds. Off Loading  Offloading or minimizing or removing weight placed on an area with poor circulation such as diabetic wounds or pressure.  This can be achieved with crutches, wheel chair, knee walker etc. Minimizing pressure through partial weight bearing (minimizing the amount of  pressure applied and or the amount of time on the area of pressure) or maintaining a non-weight bearing status can be used to promote and often can be essential for thee wound to heal. Off loading may also need to be achieved for non-weight bearing wounds such as pressure ulcers to the torso. Turning and changing positions frequently, at least every two hours. Use of pressure cushion if sitting up in chair. Skin Care  Keep skin clean and well moisturized , moisturize routinely with ointments for heavier moisturizer needs for extremely dry skin or cracks such as A&D ointment and lotions for a light moisturizer such as CeraVe or Eucerin. If incontinent change incontinence garments as soon as soiled and keeping skin clean and use barrier cream to protect the skin.         PAST MEDICAL HISTORY        Diagnosis Date    Abscess of left foot 4/22/2022    Anemia associated with acute blood loss 4/26/2022    Callus of foot     Right foot - see's Dr. Corinna Guerra    Cellulitis of left foot 4/22/2022    Diabetic ulcer of left midfoot associated with type 2 diabetes mellitus, with muscle involvement without evidence of necrosis ([address] 4/26/2022    Diabetic ulcer of left midfoot associated with type 2 diabetes mellitus, with necrosis of muscle ([address] 6/7/2021    Diabetic ulcer of right midfoot associated with type 2 diabetes mellitus, with fat layer exposed ([address] 8/11/2020    Dizziness     positional    Essential hypertension     Follows with PCP    Hyperlipidemia     Lumbar radiculopathy     Septic embolism ([address] 6/13/2020    Subacute osteomyelitis of left foot ([address] 4/22/2022       PAST SURGICAL HISTORY    Past Surgical History:   Procedure Laterality Date    ACHILLES TENDON SURGERY Right 1/8/2021    RIGHT ACHILLES TENDON LENGTHENING REPAIR performed by Camryn Loco DPM at [address]  03/2018    [facility]    DENTAL SURGERY      teeth extractions -half per patient    HERNIA REPAIR Bilateral 5/27/2020    BIALTERAL HERNIA INGUINAL REPAIR performed by Selwyn Aguirre MD at [address] Right 9/2/2022    LEG AMPUTATION BELOW KNEE performed by Maddie Chaudhari MD at [facility] Right 9/5/2022    LEG AMPUTATION BELOW KNEE REVISION performed by Maddie Chaudhari MD at [address]  2018    IN OFFICE/OUTPT VISIT,PROCEDURE ONLY Left 4/17/2018    L5-S1 HEMILAMINECTOMY, REMOVAL OF [address] LEFT SIDE performed by Mauricio Cary MD at [place] [address] Right 1/8/2021    RIGHT TRANSMETATARSAL TOE AMPUTATION performed by Delaney Huntley DPM at [facility] OR    TOE AMPUTATION Left 4/23/2022    LEFT RAY GREAT TOE AMPUTATION performed by Rosalie Calderon MD at [facility]    Family History   Problem Relation Age of Onset    Heart Disease Father     Diabetes Father     Diabetes Mother     Heart Disease Mother     Other Mother     Kidney Disease Mother     Heart Attack Mother        SOCIAL HISTORY    Social History     Tobacco Use    Smoking status: Never    Smokeless tobacco: Never   Vaping Use    Vaping Use: Never used   Substance Use Topics    Alcohol use: Yes     Comment: occasionally    Drug use: No       ALLERGIES    No Known Allergies    MEDICATIONS    Current Outpatient Medications on File Prior to Encounter   Medication Sig Dispense Refill    aspirin 81 MG chewable tablet Take 1 tablet by mouth daily Take 81 mg by mouth daily 30 tablet 2    prazosin (MINIPRESS) 1 MG capsule Take 1 capsule by mouth nightly 30 capsule 0    QUEtiapine (SEROQUEL) 200 MG tablet Take 1 tablet by mouth 2 times daily 60 tablet 0    carvedilol (COREG) 12.5 MG tablet Take 1 tablet by mouth 2 times daily (with meals) 60 tablet 0    losartan (COZAAR) 100 MG tablet Take 1 tablet by mouth daily 30 tablet 0    clopidogrel (PLAVIX) 75 MG tablet Take 1 tablet by mouth daily 30 tablet 0    pantoprazole (PROTONIX) 20 MG tablet Take 1 tablet by mouth every morning (before breakfast) 30 tablet 0    omeprazole (PRILOSEC) 20 MG delayed release capsule Take 1 capsule by mouth once daily in the morning 30 capsule 0    FLUoxetine (PROZAC) 40 MG capsule Take 2 capsules by mouth daily 30 capsule 0    insulin glargine (LANTUS SOLOSTAR) 100 UNIT/ML injection pen Inject 30 Units into the skin nightly 9 mL 0    atorvastatin (LIPITOR) 40 MG tablet Take 1 tablet by mouth nightly 30 tablet 0    hydrOXYzine pamoate (VISTARIL) 50 MG capsule Take 50 mg by mouth every 6 hours as needed for Anxiety      ondansetron (ZOFRAN) 4 MG tablet Take 1 tablet by mouth daily as needed for Nausea or Vomiting 30 tablet 0    pantoprazole (PROTONIX) 40 MG tablet       docusate sodium (COLACE, DULCOLAX) 100 MG CAPS Take 100 mg by mouth daily (Patient not taking: No sig reported)      [DISCONTINUED] metFORMIN (GLUCOPHAGE) 500 MG tablet Take 2 tablets by mouth 2 times daily (with meals) Indications: Start tomorrow 03/14 360 tablet 1    [DISCONTINUED] metoclopramide (REGLAN) 10 MG tablet Take 1 tablet by mouth 4 times daily (before meals and nightly) 120 tablet 3    sucralfate (CARAFATE) 1 GM/10ML suspension Take 10 mLs by mouth 4 times daily for 7 days (Patient not taking: Reported on 11/11/2022) 414 mL 0    glyBURIDE (DIABETA) 5 MG tablet Take 2 tablets by mouth in the morning and at bedtime (Patient not taking: No sig reported) 120 tablet 1    Dulaglutide 1.5 MG/0.5ML SOPN Inject 1.5 mg into the skin once a week (Patient not taking: No sig reported) 5 pen 3    Alcohol Swabs PADS       blood glucose monitor strips Test 2 times a day & as needed for symptoms of irregular blood glucose. Dispense sufficient amount for indicated testing frequency plus additional to accommodate PRN testing needs. (Patient not taking: No sig reported) 100 strip 0    [DISCONTINUED] pioglitazone (ACTOS) 30 MG tablet Take 1 tablet by mouth daily 90 tablet 1    blood glucose monitor kit and supplies Dispense sufficient amount for indicated testing frequency plus additional to accommodate PRN testing needs.  Dispense all needed supplies to include: monitor, strips, lancing device, lancets, control solutions, alcohol swabs. (Patient not taking: No sig reported) 1 kit 0    FreeStyle Lancets MISC 1 each by Does not apply route daily (Patient not taking: No sig reported) 100 each 3    [DISCONTINUED] acetaminophen (TYLENOL) 325 MG tablet Take 2 tablets by mouth every 4 hours as needed for Pain or Fever 120 tablet 3     No current facility-administered medications on file prior to encounter. REVIEW OF SYSTEMS    Pertinent items are noted in HPI. Constitutional: Negative for systemic symptoms including fever, chills and malaise. Objective:      BP (!) 156/99   Pulse 96   Temp (!) 96.5 °F (35.8 °C) (Temporal)   Resp 18     PHYSICAL EXAM      General: The patient is in no acute distress. Mental status:  Patient is appropriate, is  oriented to place and plan of care. Dermatologic exam: Visual inspection of the periwound reveals the skin to be normal in turgor and texture  Wound exam: see wound description below in procedure note      Assessment:     Problem List Items Addressed This Visit          Endocrine    WD-Diabetic ulcer of left midfoot associated with type 2 diabetes mellitus, with fat layer exposed ([address] - Primary    Relevant Orders    Initiate Outpatient Wound Care Protocol    Neg. Pressure Wound Therapy    Diabetic ulcer of toe of left foot associated with type 2 diabetes mellitus, with fat layer exposed ([address]    Relevant Orders    Initiate Outpatient Wound Care Protocol    Neg. Pressure Wound Therapy       Other    Cellulitis of left foot    Relevant Orders    Initiate Outpatient Wound Care Protocol    Neg. Pressure Wound Therapy    WD-Partial nontraumatic amputation of foot, left ([address]    Relevant Orders    Initiate Outpatient Wound Care Protocol    Neg.  Pressure Wound Therapy     Procedure Note    Indications:  Based on my examination of this patient's wound(s) today, sharp excision into necrotic subcutaneous tissue is required to promote healing and evaluate the extent of previous healing. Performed by: RAFAEL Macdonald CNP    Consent obtained: Yes    Time out taken:  Yes    Pain Control: Anesthetic  Anesthetic: 4% Lidocaine Liquid Topical        Debridement:Excisional Debridement    Using curette the wound(s) was/were sharply debrided down through and including the removal of subcutaneous      Devitalized Tissue Debrided:  fibrin, biofilm, slough, necrotic/eschar, and exudate    Pre Debridement Measurements:  Are located in the Wound Documentation Flow Sheet    All active wounds listed below with today's date are evaluated  Wound(s)    debrided this date include # : 3     Post  Debridement Measurements:  Negative Pressure Wound Therapy Leg Anterior; Lower;Proximal;Right (Active)   Unit Type KCI 12/20/22 0851   Dressing Type Black Foam 12/20/22 0851   Number of pieces used 3 12/13/22 1049   Number of pieces removed 1 12/20/22 0851   Cycle Continuous 12/20/22 0851   Target Pressure (mmHg) 125 12/20/22 0851   Intensity 5 12/20/22 0851   Canister changed? Yes 12/20/22 0851   Dressing Status New dressing applied 12/13/22 1049   Dressing Changed Changed/New 12/13/22 1049   Drainage Amount Moderate 12/20/22 0851   Drainage Description Sanguinous 12/20/22 0851   Dressing Change Due 12/06/22 12/06/22 1007   Wound Assessment Granulation tissue 12/20/22 0851   Rosalie-wound Assessment Intact;Fragile 12/20/22 0851   Odor Mild 12/20/22 0851   Number of days: 55       Wound 05/11/22 #2 Left Plantar  (onset unknown) (Active)   Wound Image   12/13/22 0917   Wound Etiology Diabetic 12/20/22 0851   Dressing Status New dressing applied;Clean;Dry; Intact 12/13/22 1049   Wound Cleansed Wound cleanser 12/20/22 0851   Offloading for Diabetic Foot Ulcers Diabetic shoes/inserts 12/20/22 0851   Wound Length (cm) 0 cm 12/20/22 0851   Wound Width (cm) 0 cm 12/20/22 0851   Wound Depth (cm) 0 cm 12/20/22 0851   Wound Surface Area (cm^2) 0 cm^2 12/20/22 0851   Change in Wound Size % (l*w) 100 12/20/22 0851   Wound Volume (cm^3) 0 cm^3 12/20/22 0851   Wound Healing % 100 12/20/22 0851   Post-Procedure Length (cm) 0.2 cm 12/13/22 0948   Post-Procedure Width (cm) 0.2 cm 12/13/22 0948   Post-Procedure Depth (cm) 0.1 cm 12/13/22 0948   Post-Procedure Surface Area (cm^2) 0.04 cm^2 12/13/22 0948   Post-Procedure Volume (cm^3) 0.004 cm^3 12/13/22 0948   Distance Tunneling (cm) 0 cm 12/20/22 0851   Tunneling Position ___ O'Clock 0 12/20/22 0851   Undermining Starts ___ O'Clock 0 12/20/22 0851   Undermining Ends___ O'Clock 0 12/20/22 0851   Undermining Maxium Distance (cm) 0 12/20/22 0851   Wound Assessment Granulation tissue 12/20/22 0851   Drainage Amount None 12/20/22 0851   Drainage Description Sanguinous 12/13/22 0948   Odor None 12/20/22 0851   Rosalie-wound Assessment Hyperkeratosis (callous); Intact 12/20/22 0851   Margins Attached edges 12/20/22 0851   Wound Thickness Description not for Pressure Injury Full thickness 12/20/22 0851   Number of days: 222       Wound 10/20/22 #3 right amp site. (Active)   Wound Image   12/13/22 0917   Wound Etiology Non-Healing Surgical 12/20/22 0851   Dressing Status New dressing applied;Clean;Dry; Intact 12/13/22 1049   Wound Cleansed Wound cleanser; Soap and water 12/20/22 0851   Offloading for Diabetic Foot Ulcers Other (comment) 12/20/22 0851   Wound Length (cm) 0.9 cm 12/20/22 0851   Wound Width (cm) 6.2 cm 12/20/22 0851   Wound Depth (cm) 2.5 cm 12/20/22 0851   Wound Surface Area (cm^2) 5.58 cm^2 12/20/22 0851   Change in Wound Size % (l*w) 0.36 12/20/22 0851   Wound Volume (cm^3) 13.95 cm^3 12/20/22 0851   Wound Healing % -523 12/20/22 0851   Post-Procedure Length (cm) 0.9 cm 12/20/22 0916   Post-Procedure Width (cm) 6.2 cm 12/20/22 0916   Post-Procedure Depth (cm) 2.5 cm 12/20/22 0916   Post-Procedure Surface Area (cm^2) 5.58 cm^2 12/20/22 0916   Post-Procedure Volume (cm^3) 13.95 cm^3 12/20/22 0916   Distance Tunneling (cm) 0 cm 12/20/22 0851   Tunneling Position ___ O'Clock 0 12/20/22 0851   Undermining Starts ___ O'Clock 0 12/20/22 0851   Undermining Ends___ O'Clock 0 12/20/22 0851   Undermining Maxium Distance (cm) 0 12/20/22 0851   Wound Assessment Pink/red;Slough 12/20/22 0851   Drainage Amount Moderate 12/20/22 0851   Drainage Description Sanguinous 12/20/22 0851   Odor Mild 12/20/22 0851   Rosalie-wound Assessment Intact 12/20/22 0851   Margins Undefined edges 12/20/22 0851   Wound Thickness Description not for Pressure Injury Full thickness 12/20/22 0851   Number of days: 60       Percent of Wound(s) Debrided: approximately 100%    Total  Area  Debrided: 5.62 sq cm     Bleeding:  Minimal    Hemostasis Achieved:  by pressure    Procedural Pain:  0  / 10     Post Procedural Pain:  0 / 10     Response to treatment:  Well tolerated by patient. Status of wound progress and description from last visit: Improving, regimen as bellow, follow up in one week. The patient continues to orellana depression post his mother's death. Plan:       Discharge Instructions         PHYSICIAN ORDERS AND DISCHARGE INSTRUCTIONS     NOTE: Upon discharge from the [address], you will receive a patient experience survey. We would be grateful if you would take the time to fill this survey out. Wound cleansing:              Do not scrub or use excessive force. Wash hands with soap and water before and after dressing changes. Prior to applying a clean dressing, cleanse wound with normal saline, wound cleanser, or mild soap and water. Ask the physician or nurse before getting the wound(s) wet in a shower     Daily Wound management:              Keep weight off wounds and reposition every 2 hours. Avoid standing for long periods of time. Apply wraps/stockings in AM and remove at bedtime.               If swelling is present, elevate legs to the level of the heart or above for 30 minutes 4-5 times a day and/or when sitting. When taking antibiotics take entire prescription as ordered by physician do not stop taking until medicine is all gone. Wound Care Notes:  Rx: Luiza Primrose  Apply for grafts 22: aLL GRAFTS approved 22 for Left Toe amp site  Reapply for graft 22 for Left toes amp site   YADY's   Right 1.07      Left    1.1           Date: 08/15/22   Home Care Discontinued. Needs Nurse visit         Grafts Left Foot Amp Site  Puraply #1 Graft #1 4x4 fenestrated 06/15/22   Puraply #2 Graft #2 4x4 fenestrated 22                                    Orders for this week: 22     FAX ORDERS TO [facility]! Left Plantar Foot wound -- Clean with soap and water, pat dry. Qwick to periwound. Apply A&D  Wrap with Coban 2 Lite. Leave in place for 1 week. ((((HOME CARE - PLEASE DO NOT REMOVE THE LEFT LEG DRESSING))))        WOUND VAC THERAPY: Right Leg Amp Site  DUODERM TO PERIWOUND FOR PROTECTION. APPLY SORBACT OVER BONE, STIMULEN and anasept spray TO WOUND BED AND BLACK FOAM TO WOUND BED (be sure to tuck foam into deeper area on lateral aspect of wound). SECURE VAC DRESSING WITH DRAPE. SET WOUND VAC  CONTINUOUS SUCTION. CANISTER CHANGE WEEKLY OR ACCORDING TO VOLUME OF DRAINAGE. Wrap with ACE wrap, but do not compress tubing against skin. Home Care to change vac on . WOUND CARE CENTER WILL CHANGE ON TUESDAY (IF VAC SUPPLIES SENT - NEED CANISTER AND BLACK FOAM DRESSING KIT). Follow Up Instructions: At the [address] in 1 week: Tuesday   Primary Wound Care Provider: Seble Urbina CNP   Call  for any questions or concerns.   Central [phone]        Treatment Note      Written Patient Dismissal Instructions Given            Electronically signed by RAFAEL Loya CNP on 2022 at 9:30 AM

## 2022-12-20 NOTE — DISCHARGE INSTRUCTIONS
PHYSICIAN ORDERS AND DISCHARGE INSTRUCTIONS     NOTE: Upon discharge from the [address], you will receive a patient experience survey. We would be grateful if you would take the time to fill this survey out. Wound cleansing:              Do not scrub or use excessive force. Wash hands with soap and water before and after dressing changes. Prior to applying a clean dressing, cleanse wound with normal saline, wound cleanser, or mild soap and water. Ask the physician or nurse before getting the wound(s) wet in a shower     Daily Wound management:              Keep weight off wounds and reposition every 2 hours. Avoid standing for long periods of time. Apply wraps/stockings in AM and remove at bedtime. If swelling is present, elevate legs to the level of the heart or above for 30 minutes 4-5 times a day and/or when sitting. When taking antibiotics take entire prescription as ordered by physician do not stop taking until medicine is all gone. Wound Care Notes:  Rx: José Peak  Apply for grafts 05/11/22: aLL GRAFTS approved 06/08/22 for Left Toe amp site  Reapply for graft 08/22/22 for Left toes amp site   YADY's   Right 1.07      Left    1.1           Date: 08/15/22   Home Care Discontinued. Needs Nurse visit         Grafts Left Foot Amp Site  Puraply #1 Graft #1 4x4 fenestrated 06/15/22   Puraply #2 Graft #2 4x4 fenestrated 06/22/22                                    Orders for this week: 12/20/22     FAX ORDERS TO [facility]! Left Plantar Foot wound -- Clean with soap and water, pat dry. Qwick to periwound. Apply A&D  Wrap with Coban 2 Lite. Leave in place for 1 week. ((((HOME CARE - PLEASE DO NOT REMOVE THE LEFT LEG DRESSING))))        WOUND VAC THERAPY: Right Leg Amp Site  DUODERM TO PERIWOUND FOR PROTECTION.  APPLY SORBACT OVER BONE, STIMULEN and anasept spray TO WOUND BED AND BLACK FOAM TO WOUND BED (be sure to tuck foam into deeper area on lateral aspect of wound). SECURE VAC DRESSING WITH DRAPE. SET WOUND VAC  CONTINUOUS SUCTION. CANISTER CHANGE WEEKLY OR ACCORDING TO VOLUME OF DRAINAGE. Wrap with ACE wrap, but do not compress tubing against skin. Home Care to change vac on . WOUND CARE CENTER WILL CHANGE ON TUESDAY (IF VAC SUPPLIES SENT - NEED CANISTER AND BLACK FOAM DRESSING KIT). Follow Up Instructions: At the [address] [place] [address] Road in 1 week: Tuesday   Primary Wound Care Provider: Jackie Schumacher CNP   Call  for any questions or concerns.   Central [phone]

## 2022-12-20 NOTE — CARE COORDINATION
Phone call to Pt to follow up regarding housing. Pt reported he had his Wound care visit and PT today    Pt reported his PT ended, not doing anything else until after the woundvac is removed, Pt states he has about 3 more weeks of the woundvac. Pt reported he will resume therapy once he receives his prosthetic. Pt confirmed he received mailing from this SW regarding low income housing, stating he needs to review it. SW discussed plan to send Pt list of facilities in [facility] that accept AL waiver so he can call and explain his condition, stating they would request additional medical documentation before they could accept him. Pt was in agreement. KOLE will send list of AL facilities to Pt via postal mail. KOLE plan of care:  KOLE will follow up with Pt in 2 weeks on 1/3 regarding housing / AL.

## 2022-12-20 NOTE — PROGRESS NOTES
Multilayer Compression Wrap   (Not Unna) Below the Knee    NAME:  Dany Brown OF BIRTH:  1980  MEDICAL RECORD NUMBER:  [id number]  DATE:  12/20/2022    Multilayer compression wrap: Removed old Multilayer wrap if indicated and wash leg with mild soap/water. Applied moisturizing agent to dry skin as needed. Applied primary and secondary dressing as ordered. Applied multilayered dressing below the knee to left lower leg. Instructed patient/caregiver not to remove dressing and to keep it clean and dry. Instructed patient/caregiver on complications to report to provider, such as pain, numbness in toes, heavy drainage, and slippage of dressing. Instructed patient on purpose of compression dressing and on activity and exercise recommendations.       Electronically signed by Dinorah Harden LPN on 46/06/3663 at 9:42 AM

## 2022-12-21 ENCOUNTER — CARE COORDINATION (OUTPATIENT)
Dept: CARE COORDINATION | Age: 42
End: 2022-12-21

## 2022-12-21 NOTE — CARE COORDINATION
SW received an after hours text message from Pt at 7:08pm stating, \"Hey it's Chrystine Goldmann my purpose in life has been completed I took care of my parents until they  now I don't have a purpose for me\". SW did not see the message until 11:11pm.  SW did attempt to contact supervisor, Harish Moran on her personal and work cell numbers, no answer. SW did attempt to call Pt, no answer. SW did leave Pt a text message stating if Pt is having thoughts of suicide he should reach out to suicide hotline at 65, stating this SW will check on him in the morning. SW did consult with Supervisor, Harish Moran around 7:30am.  SW will check on Pt first thing this morning via phone call. No answer. SW attempted to text Pt, no answer. SW did contact [facility] and spoke with officer Diaz Patricia who reported they would complete a well visit at this time. SW did provide contact information. Received phone call from Pt stating the police just left. Pt reported he is fine. Pt reported the holidays are tough for him after losing his parents. Pt reported he did not attend [employer] last week as his friends were out of town and does not believe he will be attending [employer] this upcoming weekend due to the holidays. Pt denied any current suicidal ideations. Discussed with Pt that work hours for this SW are between [address] and that this SW happened to have her phone on as it was charging and saw the text message that he sent at 7pm, but not until 11pm.  Discussed with Pt that he could contact friends, brother, 65 or 46. Pt stated understanding. Consult with Luis Cartagena. Merged call to [id number] [facility]. Pt needs to complete pt information packet. Initial appointments are daily, need to call the day of to schedule intake appointment for that day and their hours are M-F 8am-8pm.      Merged call to  [address] to ask questions regarding transportation.   Pooja Cyr left voicemail message requesting return call, providing her contact information. Phone call to Dr. Sarah Pak office to schedule counseling appointment. Spoke with Leno Stacy who reported Pt has an appointment scheduled for 1/4 @ 10am, SW asked for something sooner, but Dr. Lisandro Murphy is off the rest of the year for the holidays. Phone call to Pt to provide update regarding Dr. Lisandro Murphy appointment on 1/4 @ 10am.  Discussed intake process for [address] [place] [address]. Pt stated he might be able to find a ride, not sure at this time. Pt reported he was sleeping when this SW called him. SW urged Pt to  application, complete it and this SW can ask them to mail the application out to his home in case he cannot get a ride there. Phone call to [address] [place] [address] to request intake packet is mailed to Pt's home. Staff reported they would mail it out, but Pt is required to bring it back in to the office once completed. KOLE plan of care:  KOLE will follow up with Pt on 12/28 to discuss progress with completing application and scheduling intake with Jennifer [address] [place] [address].   KOLE will remind Pt of 1/4 VV with Dr Lisandro Murphy at [place] [id number].

## 2022-12-22 ENCOUNTER — CARE COORDINATION (OUTPATIENT)
Dept: CARE COORDINATION | Age: 42
End: 2022-12-22

## 2022-12-22 NOTE — CARE COORDINATION
Pt reported he received a phone call from someone in [facility], [facility] stating they are taking Pt off the wait list for AL waiver at this time. PT reported there is a lien on the house due to his  mother's unpaid medical bills     Pt discussed feelings of discouragement at not being able to find AL or SNF placement. Discussed option of contacting a facility out of [facility], which is located in [place] where they accept individuals with mental health, drug / alcohol abuse and medical concerns. Pt was in agreement with this SW contacting them to determine if they would accept him and how would Pt be transported to facility. Phone call to [employer] [facility] to identify a SNF who accepts individuals with mental health concerns / alcohol abuse concerns. SW was provided with information for [facility] and [facility] and [place] [address].. Phone call to [facility] admissions. No answer, voicemail message left requesting return call, contact number provided. Phone call to [facility] and [place] [address]. admissions and spoke with Kellie. Discussed Pt care needs and Kellie requested demographics, H&P, current med list is faxed to her at [phone] and she will have her supervisor review the documentation next week as she is out of the office the rest of this week. Attempted phone call to Real Chambers in admissions at [facility]. No answer, voicemail message left requesting return call, contact number provided. KOLE plan of care:  KOLE will route message to PCP requesting H&P, med list and demographics are faxed to [facility] for review. KOLE will follow up with Chele on  and will follow up with Pt in one week on .

## 2022-12-23 ENCOUNTER — TELEPHONE (OUTPATIENT)
Dept: FAMILY MEDICINE CLINIC | Age: 42
End: 2022-12-23

## 2022-12-23 NOTE — TELEPHONE ENCOUNTER
To Mary-    Talked with patient to reschedule appt-----he said to let you know he is doing fine at this time. He did reschedule his appointment to 1/10/23.

## 2022-12-27 ENCOUNTER — HOSPITAL ENCOUNTER (OUTPATIENT)
Dept: WOUND CARE | Age: 42
Discharge: HOME | End: 2022-12-27
Payer: MEDICARE

## 2022-12-27 ENCOUNTER — CARE COORDINATION (OUTPATIENT)
Dept: CARE COORDINATION | Age: 42
End: 2022-12-27

## 2022-12-27 VITALS
RESPIRATION RATE: 18 BRPM | TEMPERATURE: 97.6 F | SYSTOLIC BLOOD PRESSURE: 151 MMHG | DIASTOLIC BLOOD PRESSURE: 82 MMHG | HEART RATE: 86 BPM

## 2022-12-27 DIAGNOSIS — L97.522 DIABETIC ULCER OF TOE OF LEFT FOOT ASSOCIATED WITH TYPE 2 DIABETES MELLITUS, WITH FAT LAYER EXPOSED (HCC): ICD-10-CM

## 2022-12-27 DIAGNOSIS — L03.116 CELLULITIS OF LEFT FOOT: ICD-10-CM

## 2022-12-27 DIAGNOSIS — Z89.432 PARTIAL NONTRAUMATIC AMPUTATION OF FOOT, LEFT (HCC): ICD-10-CM

## 2022-12-27 DIAGNOSIS — E11.621 DIABETIC ULCER OF TOE OF LEFT FOOT ASSOCIATED WITH TYPE 2 DIABETES MELLITUS, WITH FAT LAYER EXPOSED (HCC): ICD-10-CM

## 2022-12-27 DIAGNOSIS — E11.621 DIABETIC ULCER OF LEFT MIDFOOT ASSOCIATED WITH TYPE 2 DIABETES MELLITUS, WITH FAT LAYER EXPOSED (HCC): Primary | ICD-10-CM

## 2022-12-27 DIAGNOSIS — L97.422 DIABETIC ULCER OF LEFT MIDFOOT ASSOCIATED WITH TYPE 2 DIABETES MELLITUS, WITH FAT LAYER EXPOSED (HCC): Primary | ICD-10-CM

## 2022-12-27 PROCEDURE — 11055 PARING/CUTG B9 HYPRKER LES 1: CPT | Performed by: NURSE PRACTITIONER

## 2022-12-27 PROCEDURE — 6370000000 HC RX 637 (ALT 250 FOR IP): Performed by: NURSE PRACTITIONER

## 2022-12-27 PROCEDURE — 97605 NEG PRS WND THER DME<=50SQCM: CPT

## 2022-12-27 PROCEDURE — 11055 PARING/CUTG B9 HYPRKER LES 1: CPT

## 2022-12-27 PROCEDURE — 11042 DBRDMT SUBQ TIS 1ST 20SQCM/<: CPT | Performed by: NURSE PRACTITIONER

## 2022-12-27 PROCEDURE — 11042 DBRDMT SUBQ TIS 1ST 20SQCM/<: CPT

## 2022-12-27 RX ORDER — BETAMETHASONE DIPROPIONATE 0.05 %
OINTMENT (GRAM) TOPICAL ONCE
OUTPATIENT
Start: 2022-12-27 | End: 2022-12-27

## 2022-12-27 RX ORDER — GINSENG 100 MG
CAPSULE ORAL ONCE
OUTPATIENT
Start: 2022-12-27 | End: 2022-12-27

## 2022-12-27 RX ORDER — BACITRACIN ZINC AND POLYMYXIN B SULFATE 500; 1000 [USP'U]/G; [USP'U]/G
OINTMENT TOPICAL ONCE
OUTPATIENT
Start: 2022-12-27 | End: 2022-12-27

## 2022-12-27 RX ORDER — LIDOCAINE HYDROCHLORIDE 20 MG/ML
JELLY TOPICAL ONCE
OUTPATIENT
Start: 2022-12-27 | End: 2022-12-27

## 2022-12-27 RX ORDER — LIDOCAINE HYDROCHLORIDE 40 MG/ML
SOLUTION TOPICAL ONCE
Status: COMPLETED | OUTPATIENT
Start: 2022-12-27 | End: 2022-12-27

## 2022-12-27 RX ORDER — GENTAMICIN SULFATE 1 MG/G
OINTMENT TOPICAL ONCE
OUTPATIENT
Start: 2022-12-27 | End: 2022-12-27

## 2022-12-27 RX ORDER — LIDOCAINE 40 MG/G
CREAM TOPICAL ONCE
OUTPATIENT
Start: 2022-12-27 | End: 2022-12-27

## 2022-12-27 RX ORDER — CLOBETASOL PROPIONATE 0.5 MG/G
OINTMENT TOPICAL ONCE
OUTPATIENT
Start: 2022-12-27 | End: 2022-12-27

## 2022-12-27 RX ORDER — BACITRACIN, NEOMYCIN, POLYMYXIN B 400; 3.5; 5 [USP'U]/G; MG/G; [USP'U]/G
OINTMENT TOPICAL ONCE
OUTPATIENT
Start: 2022-12-27 | End: 2022-12-27

## 2022-12-27 RX ORDER — LIDOCAINE HYDROCHLORIDE 40 MG/ML
SOLUTION TOPICAL ONCE
OUTPATIENT
Start: 2022-12-27 | End: 2022-12-27

## 2022-12-27 RX ORDER — LIDOCAINE 50 MG/G
OINTMENT TOPICAL ONCE
OUTPATIENT
Start: 2022-12-27 | End: 2022-12-27

## 2022-12-27 RX ADMIN — LIDOCAINE HYDROCHLORIDE: 40 SOLUTION TOPICAL at 09:47

## 2022-12-27 ASSESSMENT — PAIN SCALES - WONG BAKER: WONGBAKER_NUMERICALRESPONSE: 0

## 2022-12-27 NOTE — PROGRESS NOTES
Wound Care Center Progress Note With Procedure    Madan Ho  AGE: 43 y.o. GENDER: male  : 1980  EPISODE DATE:  2022     Subjective:     Chief Complaint   Patient presents with    Wound Check     Right lower leg amp site          HISTORY of PRESENT ILLNESS      Madan Ho is a 43 y.o. male who presents today for wound evaluation of Chronic diabetic, pressure and non-healing surgical ulcer(s) of the left medial foot and lateral plantar surface. The ulcer is of marked severity. The underlying cause of the wound is diabetes, non-healing surgical. He presents today with a new wound to the right plantar foot that is diabetic and pressure in nature and of moderate severity. The patient has significant underlying medical conditions as below. The patient is having significant depression related to the recent and sudden death of his mother. 10/20/22: The patient was here last 10/4/22 and his right BKA site was well approximated, staples removed and steri strips. The prosthetic supplier was with him and plans had started to get fitted. Unfortunately, the patient fell the same night at home and the wound dehisced. He was evaluated at the ED. The patient then was not seen at the wound clinic for a few weeks as he was hospitalized for suicidal ideation. Today able to probe and visibly see bone. Home Health still in place, will apply for a wound vac. Will also start Bactrim. 22: Since his last visit to the wound clinic 22 the patient was hospitalized -22) at [facility] with necrotizing fascitis right lower leg with resulting right BKA. 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 Juli Molina. She placed a wound VAC to on the stump at that time. On 2022 Dr. Regina Tellez performed a primary closure of his right BKA stump.     Wound Pain Timing/Severity: none  Quality of pain: N/A  Severity of pain:  0 / 10   Modifying Factors: diabetes and chronic pressure  Associated Signs/Symptoms: drainage     Diabetes: Yes, on an oral and insulin regimen, last A1c 10.5 as of 7/28/22  Diabetes education provided today:    Diabetes pathoetiology, difference between type 1 and type 2 diabetes, and progressive nature of Type 2 DM. Diabetic Neuropathy: signs and therapy. Foot care: advised to wash feet daily, pat dry and apply lotion at night, avoiding between toes. Need to look at feet daily and report to a physician any signs of inflammation or skin damage. Discussed diabetes shoes and socks. Diabetic management related to wound care    Smoking: Never smoker  Obesity:No  Anticoagulant therapy: No  Immunosuppression: No    Patient educated on the 6 essential components necessary for wound healing: Circulation, Debridements, Proper Dressings and Topical Wound Products, Infection Control, Edema Control and Offloading. Patient educated on those factors that negatively effect or impact wound healing: smoking, obesity, uncontrolled diabetes, anticoagulant and immunosuppressive regimens, inadequate nutrition, untreated arterial and venous disease if applicable and measures to manage edema. Nutritional status: well nourished. Discussed need for increased protein and calories for wound healing and good sources of protein (just over 7 grams for every 20 pounds of body weight). Animal-based foods high in protein (meat, poultry, fish, eggs, and dairy foods). Plant based foods high in protein (tofu, lentils, beans, chickpeas, nuts, quinoa and den seeds. Off Loading  Offloading or minimizing or removing weight placed on an area with poor circulation such as diabetic wounds or pressure.  This can be achieved with crutches, wheel chair, knee walker etc. Minimizing pressure through partial weight bearing (minimizing the amount of  pressure applied and or the amount of time on the area of pressure) or maintaining a non-weight bearing status can be used to promote and often can be essential for thee wound to heal. Off loading may also need to be achieved for non-weight bearing wounds such as pressure ulcers to the torso. Turning and changing positions frequently, at least every two hours. Use of pressure cushion if sitting up in chair. Skin Care  Keep skin clean and well moisturized , moisturize routinely with ointments for heavier moisturizer needs for extremely dry skin or cracks such as A&D ointment and lotions for a light moisturizer such as CeraVe or Eucerin. If incontinent change incontinence garments as soon as soiled and keeping skin clean and use barrier cream to protect the skin.         PAST MEDICAL HISTORY        Diagnosis Date    Abscess of left foot 4/22/2022    Anemia associated with acute blood loss 4/26/2022    Callus of foot     Right foot - see's Dr. Megan Natarajan    Cellulitis of left foot 4/22/2022    Diabetic ulcer of left midfoot associated with type 2 diabetes mellitus, with muscle involvement without evidence of necrosis ([address] 4/26/2022    Diabetic ulcer of left midfoot associated with type 2 diabetes mellitus, with necrosis of muscle ([address] 6/7/2021    Diabetic ulcer of right midfoot associated with type 2 diabetes mellitus, with fat layer exposed ([address] 8/11/2020    Dizziness     positional    Essential hypertension     Follows with PCP    Hyperlipidemia     Lumbar radiculopathy     Septic embolism ([address] 6/13/2020    Subacute osteomyelitis of left foot ([address] 4/22/2022       PAST SURGICAL HISTORY    Past Surgical History:   Procedure Laterality Date    ACHILLES TENDON SURGERY Right 1/8/2021    RIGHT ACHILLES TENDON LENGTHENING REPAIR performed by Gatito Ren DPM at [address] [place] [address]  03/2018    [address] [place] [address]    DENTAL SURGERY      teeth extractions -half per patient    HERNIA REPAIR Bilateral 5/27/2020    BIALTERAL HERNIA INGUINAL REPAIR performed by Boo Rodarte MD at [address] [place] [address],[facility] Right 9/2/2022    LEG AMPUTATION BELOW KNEE performed by Bennett Alves MD at [address] Right 9/5/2022    LEG AMPUTATION BELOW KNEE REVISION performed by Bennett Alves MD at [facility] 2018    CO OFFICE/OUTPT VISIT,PROCEDURE ONLY Left 4/17/2018    L5-S1 HEMILAMINECTOMY, REMOVAL OF [address] LEFT SIDE performed by Shirlean Duverney, MD at [address] Right 1/8/2021    RIGHT TRANSMETATARSAL TOE AMPUTATION performed by Mark Watts DPM at [facility] OR    TOE AMPUTATION Left 4/23/2022    LEFT RAY GREAT TOE AMPUTATION performed by Ezra Marrufo MD at [address] [facility]    Family History   Problem Relation Age of Onset    Heart Disease Father     Diabetes Father     Diabetes Mother     Heart Disease Mother     Other Mother     Kidney Disease Mother     Heart Attack Mother        SOCIAL HISTORY    Social History     Tobacco Use    Smoking status: Never    Smokeless tobacco: Never   Vaping Use    Vaping Use: Never used   Substance Use Topics    Alcohol use: Yes     Comment: occasionally    Drug use: No       ALLERGIES    No Known Allergies    MEDICATIONS    Current Outpatient Medications on File Prior to Encounter   Medication Sig Dispense Refill    aspirin 81 MG chewable tablet Take 1 tablet by mouth daily Take 81 mg by mouth daily 30 tablet 2    prazosin (MINIPRESS) 1 MG capsule Take 1 capsule by mouth nightly 30 capsule 0    QUEtiapine (SEROQUEL) 200 MG tablet Take 1 tablet by mouth 2 times daily 60 tablet 0    carvedilol (COREG) 12.5 MG tablet Take 1 tablet by mouth 2 times daily (with meals) 60 tablet 0    losartan (COZAAR) 100 MG tablet Take 1 tablet by mouth daily 30 tablet 0    clopidogrel (PLAVIX) 75 MG tablet Take 1 tablet by mouth daily 30 tablet 0    pantoprazole (PROTONIX) 20 MG tablet Take 1 tablet by mouth every morning (before breakfast) 30 tablet 0    omeprazole (PRILOSEC) 20 MG delayed release capsule Take 1 capsule by mouth once daily in the morning 30 capsule 0    FLUoxetine (PROZAC) 40 MG capsule Take 2 capsules by mouth daily 30 capsule 0    insulin glargine (LANTUS SOLOSTAR) 100 UNIT/ML injection pen Inject 30 Units into the skin nightly 9 mL 0    atorvastatin (LIPITOR) 40 MG tablet Take 1 tablet by mouth nightly 30 tablet 0    hydrOXYzine pamoate (VISTARIL) 50 MG capsule Take 50 mg by mouth every 6 hours as needed for Anxiety      ondansetron (ZOFRAN) 4 MG tablet Take 1 tablet by mouth daily as needed for Nausea or Vomiting 30 tablet 0    pantoprazole (PROTONIX) 40 MG tablet       docusate sodium (COLACE, DULCOLAX) 100 MG CAPS Take 100 mg by mouth daily (Patient not taking: No sig reported)      [DISCONTINUED] metFORMIN (GLUCOPHAGE) 500 MG tablet Take 2 tablets by mouth 2 times daily (with meals) Indications: Start tomorrow 03/14 360 tablet 1    [DISCONTINUED] metoclopramide (REGLAN) 10 MG tablet Take 1 tablet by mouth 4 times daily (before meals and nightly) 120 tablet 3    sucralfate (CARAFATE) 1 GM/10ML suspension Take 10 mLs by mouth 4 times daily for 7 days (Patient not taking: Reported on 11/11/2022) 414 mL 0    glyBURIDE (DIABETA) 5 MG tablet Take 2 tablets by mouth in the morning and at bedtime (Patient not taking: No sig reported) 120 tablet 1    Dulaglutide 1.5 MG/0.5ML SOPN Inject 1.5 mg into the skin once a week (Patient not taking: No sig reported) 5 pen 3    Alcohol Swabs PADS       blood glucose monitor strips Test 2 times a day & as needed for symptoms of irregular blood glucose. Dispense sufficient amount for indicated testing frequency plus additional to accommodate PRN testing needs. (Patient not taking: No sig reported) 100 strip 0    [DISCONTINUED] pioglitazone (ACTOS) 30 MG tablet Take 1 tablet by mouth daily 90 tablet 1    blood glucose monitor kit and supplies Dispense sufficient amount for indicated testing frequency plus additional to accommodate PRN testing needs.  Dispense all needed supplies to include: monitor, strips, lancing device, lancets, control solutions, alcohol swabs. (Patient not taking: No sig reported) 1 kit 0    FreeStyle Lancets MISC 1 each by Does not apply route daily (Patient not taking: No sig reported) 100 each 3    [DISCONTINUED] acetaminophen (TYLENOL) 325 MG tablet Take 2 tablets by mouth every 4 hours as needed for Pain or Fever 120 tablet 3     No current facility-administered medications on file prior to encounter. REVIEW OF SYSTEMS    Pertinent items are noted in HPI. Constitutional: Negative for systemic symptoms including fever, chills and malaise. Objective:      BP (!) 151/82   Pulse 86   Temp 97.6 °F (36.4 °C) (Temporal)   Resp 18     PHYSICAL EXAM      General: The patient is in no acute distress. Mental status:  Patient is appropriate, is  oriented to place and plan of care. Dermatologic exam: Visual inspection of the periwound reveals the skin to be normal in turgor and texture  Wound exam: see wound description below in procedure note      Assessment:     Problem List Items Addressed This Visit          Endocrine    WD-Diabetic ulcer of left midfoot associated with type 2 diabetes mellitus, with fat layer exposed ([address] - Primary    Relevant Orders    Initiate Outpatient Wound Care Protocol    Diabetic ulcer of toe of left foot associated with type 2 diabetes mellitus, with fat layer exposed ([address]    Relevant Orders    Initiate Outpatient Wound Care Protocol       Other    Cellulitis of left foot    Relevant Orders    Initiate Outpatient Wound Care Protocol    WD-Partial nontraumatic amputation of foot, left [facility])    Relevant Orders    Initiate Outpatient Wound Care Protocol     Procedure Note    Indications:  Based on my examination of this patient's wound(s) today, sharp excision into necrotic subcutaneous tissue is required to promote healing and evaluate the extent of previous healing.     Performed by: RAFAEL Dougherty - CNP    Consent obtained: Yes    Time out taken:  Yes    Pain Control: Anesthetic  Anesthetic: Anesthetic  Anesthetic: 4% Lidocaine Liquid Topical     Debridement:Excisional Debridement    Using curette the wound(s) was/were sharply debrided down through and including the removal of subcutaneous      Devitalized Tissue Debrided:  fibrin, biofilm, slough, necrotic/eschar, and exudate    Pre Debridement Measurements:  Are located in the Wound Documentation Flow Sheet    All active wounds listed below with today's date are evaluated  Wound(s)    debrided this date include # : 3    Post  Debridement Measurements:  Negative Pressure Wound Therapy Leg Anterior; Lower;Proximal;Right (Active)   Unit Type KCI 12/20/22 0851   Dressing Type Black Foam 12/20/22 0851   Number of pieces used 3 12/13/22 1049   Number of pieces removed 1 12/20/22 0851   Cycle Continuous 12/20/22 0851   Target Pressure (mmHg) 125 12/20/22 0851   Intensity 5 12/20/22 0851   Canister changed? Yes 12/20/22 0851   Dressing Status New dressing applied 12/13/22 1049   Dressing Changed Changed/New 12/13/22 1049   Drainage Amount Moderate 12/20/22 0851   Drainage Description Sanguinous 12/20/22 0851   Dressing Change Due 12/06/22 12/06/22 1007   Wound Assessment Granulation tissue 12/20/22 0851   Rosalie-wound Assessment Intact;Fragile 12/20/22 0851   Odor Mild 12/20/22 0851   Number of days: 55       Wound 05/11/22 #2 Left Plantar  (onset unknown) (Active)   Wound Image   12/13/22 0917   Wound Etiology Diabetic 12/20/22 0851   Dressing Status New dressing applied;Clean;Dry; Intact 12/13/22 1049   Wound Cleansed Wound cleanser 12/20/22 0851   Offloading for Diabetic Foot Ulcers Diabetic shoes/inserts 12/20/22 0851   Wound Length (cm) 0 cm 12/20/22 0851   Wound Width (cm) 0 cm 12/20/22 0851   Wound Depth (cm) 0 cm 12/20/22 0851   Wound Surface Area (cm^2) 0 cm^2 12/20/22 0851   Change in Wound Size % (l*w) 100 12/20/22 0851   Wound Volume (cm^3) 0 cm^3 12/20/22 0966   Wound Healing % 100 12/20/22 0851   Post-Procedure Length (cm) 0.2 cm 12/13/22 0948   Post-Procedure Width (cm) 0.2 cm 12/13/22 0948   Post-Procedure Depth (cm) 0.1 cm 12/13/22 0948   Post-Procedure Surface Area (cm^2) 0.04 cm^2 12/13/22 0948   Post-Procedure Volume (cm^3) 0.004 cm^3 12/13/22 0948   Distance Tunneling (cm) 0 cm 12/20/22 0851   Tunneling Position ___ O'Clock 0 12/20/22 0851   Undermining Starts ___ O'Clock 0 12/20/22 0851   Undermining Ends___ O'Clock 0 12/20/22 0851   Undermining Maxium Distance (cm) 0 12/20/22 0851   Wound Assessment Granulation tissue 12/20/22 0851   Drainage Amount None 12/20/22 0851   Drainage Description Sanguinous 12/13/22 0948   Odor None 12/20/22 0851   Rosalie-wound Assessment Hyperkeratosis (callous); Intact 12/20/22 0851   Margins Attached edges 12/20/22 0851   Wound Thickness Description not for Pressure Injury Full thickness 12/20/22 0851   Number of days: 222       Wound 10/20/22 #3 right amp site. (Active)   Wound Image   12/13/22 0917   Wound Etiology Non-Healing Surgical 12/20/22 0851   Dressing Status New dressing applied;Clean;Dry; Intact 12/13/22 1049   Wound Cleansed Wound cleanser; Soap and water 12/20/22 0851   Offloading for Diabetic Foot Ulcers Other (comment) 12/20/22 0851   Wound Length (cm) 0.9 cm 12/20/22 0851   Wound Width (cm) 6.2 cm 12/20/22 0851   Wound Depth (cm) 2.5 cm 12/20/22 0851   Wound Surface Area (cm^2) 5.58 cm^2 12/20/22 0851   Change in Wound Size % (l*w) 0.36 12/20/22 0851   Wound Volume (cm^3) 13.95 cm^3 12/20/22 0851   Wound Healing % -523 12/20/22 0851   Post-Procedure Length (cm) 0.9 cm 12/20/22 0916   Post-Procedure Width (cm) 6.2 cm 12/20/22 0916   Post-Procedure Depth (cm) 2.5 cm 12/20/22 0916   Post-Procedure Surface Area (cm^2) 5.58 cm^2 12/20/22 0916   Post-Procedure Volume (cm^3) 13.95 cm^3 12/20/22 0916   Distance Tunneling (cm) 0 cm 12/20/22 0851   Tunneling Position ___ O'Clock 0 12/20/22 0851   Undermining Starts ___ O'Clock 0 12/20/22 0851   Undermining Ends___ O'Clock 0 12/20/22 0851   Undermining Maxium Distance (cm) 0 12/20/22 0851   Wound Assessment Pink/red;Slough 12/20/22 0851   Drainage Amount Moderate 12/20/22 0851   Drainage Description Sanguinous 12/20/22 0851   Odor Mild 12/20/22 0851   Rosalie-wound Assessment Intact 12/20/22 0851   Margins Undefined edges 12/20/22 0851   Wound Thickness Description not for Pressure Injury Full thickness 12/20/22 0851   Number of days: 60     Percent of Wound(s) Debrided: approximately 100%    Total  Area  Debrided: 5.58 sq cm     Bleeding:  Minimal    Hemostasis Achieved:  by pressure    Procedural Pain:  0  / 10     Post Procedural Pain:  0 / 10     Response to treatment:  Well tolerated by patient. Status of wound progress and description from last visit: Improving, regimen as below, follow up in one week. The patient continues to orellana depression post his mother's death. Hyperkeratotic tissue noted left foot, paring of callous completed using curette. No wound present within the callous. Medical necessity includes advanced atrophic changes to include decrease hair growth lower legs, thickening, discolored toenails, rubor, edema and pain with ambulating. PCP is RAFAEL David CNP last seen 11/22/22. Plan:       Discharge Instructions         PHYSICIAN ORDERS AND DISCHARGE INSTRUCTIONS     NOTE: Upon discharge from the [address], you will receive a patient experience survey. We would be grateful if you would take the time to fill this survey out. Wound cleansing:              Do not scrub or use excessive force. Wash hands with soap and water before and after dressing changes. Prior to applying a clean dressing, cleanse wound with normal saline, wound cleanser, or mild soap and water.               Ask the physician or nurse before getting the wound(s) wet in a shower     Daily Wound management:              Keep weight off wounds and reposition every 2 hours. Avoid standing for long periods of time. Apply wraps/stockings in AM and remove at bedtime. If swelling is present, elevate legs to the level of the heart or above for 30 minutes 4-5 times a day and/or when sitting. When taking antibiotics take entire prescription as ordered by physician do not stop taking until medicine is all gone. Wound Care Notes:  Rx: Rajni King  Apply for grafts 05/11/22: aLL GRAFTS approved 06/08/22 for Left Toe amp site  Reapply for graft 08/22/22 for Left toes amp site   YADY's   Right 1.07      Left    1.1           Date: 08/15/22   Home Care Discontinued. Needs Nurse visit         Grafts Left Foot Amp Site  Puraply #1 Graft #1 4x4 fenestrated 06/15/22   Puraply #2 Graft #2 4x4 fenestrated 06/22/22                                    Orders for this week: 12/27/22     FAX ORDERS TO [facility]! Left Plantar Foot wound -- Clean with soap and water, pat dry. Apply A&D  Wrap with Conform and Coban  Leave in place for 1 week. ((((HOME CARE - PLEASE DO NOT REMOVE THE LEFT LEG DRESSING))))        WOUND VAC THERAPY: Right Leg Amp Site  DUODERM TO PERIWOUND FOR PROTECTION. APPLY SORBACT OVER BONE, STIMULEN and anasept spray TO WOUND BED AND BLACK FOAM TO WOUND BED (be sure to tuck foam into deeper area on lateral aspect of wound). SECURE VAC DRESSING WITH DRAPE. SET WOUND VAC  CONTINUOUS SUCTION. CANISTER CHANGE WEEKLY OR ACCORDING TO VOLUME OF DRAINAGE. Wrap with ACE wrap, but do not compress tubing against skin. Home Care to change vac on FRIDAYS. WOUND CARE CENTER WILL CHANGE ON TUESDAY (IF VAC SUPPLIES SENT - NEED CANISTER AND BLACK FOAM DRESSING KIT). Follow Up Instructions: At the [address] in 1 week: Tuesday   Primary Wound Care Provider: Gill Goldsmith CNP   Call  for any questions or concerns.   Central Scheduling: [phone]        Treatment Note      Written Patient Dismissal Instructions Given            Electronically signed by RAFAEL Anne CNP on 12/27/2022 at 9:55 AM

## 2022-12-27 NOTE — DISCHARGE INSTRUCTIONS
PHYSICIAN ORDERS AND DISCHARGE INSTRUCTIONS     NOTE: Upon discharge from the [address], you will receive a patient experience survey. We would be grateful if you would take the time to fill this survey out. Wound cleansing:              Do not scrub or use excessive force. Wash hands with soap and water before and after dressing changes. Prior to applying a clean dressing, cleanse wound with normal saline, wound cleanser, or mild soap and water. Ask the physician or nurse before getting the wound(s) wet in a shower     Daily Wound management:              Keep weight off wounds and reposition every 2 hours. Avoid standing for long periods of time. Apply wraps/stockings in AM and remove at bedtime. If swelling is present, elevate legs to the level of the heart or above for 30 minutes 4-5 times a day and/or when sitting. When taking antibiotics take entire prescription as ordered by physician do not stop taking until medicine is all gone. Wound Care Notes:  Rx: Francisco Javier Philippe  Apply for grafts 05/11/22: aLL GRAFTS approved 06/08/22 for Left Toe amp site  Reapply for graft 08/22/22 for Left toes amp site   YADY's   Right 1.07      Left    1.1           Date: 08/15/22   Home Care Discontinued. Needs Nurse visit         Grafts Left Foot Amp Site  Puraply #1 Graft #1 4x4 fenestrated 06/15/22   Puraply #2 Graft #2 4x4 fenestrated 06/22/22                                    Orders for this week: 12/27/22     FAX ORDERS TO [facility]! Left Plantar Foot wound -- Clean with soap and water, pat dry. Apply A&D  Wrap with Conform and Coban  Leave in place for 1 week. ((((HOME CARE - PLEASE DO NOT REMOVE THE LEFT LEG DRESSING))))        WOUND VAC THERAPY: Right Leg Amp Site  DUODERM TO PERIWOUND FOR PROTECTION.  APPLY SORBACT OVER BONE, STIMULEN and anasept spray TO WOUND BED AND BLACK FOAM TO WOUND BED (be sure to tuck foam into deeper area on lateral aspect of wound). SECURE VAC DRESSING WITH DRAPE. SET WOUND VAC  CONTINUOUS SUCTION. CANISTER CHANGE WEEKLY OR ACCORDING TO VOLUME OF DRAINAGE. Wrap with ACE wrap, but do not compress tubing against skin. Home Care to change vac on . WOUND CARE CENTER WILL CHANGE ON TUESDAY (IF VAC SUPPLIES SENT - NEED CANISTER AND BLACK FOAM DRESSING KIT). Follow Up Instructions: At the [address] in 1 week: Tuesday   Primary Wound Care Provider: Anyi Cameron CNP   Call  for any questions or concerns.   Central [phone]

## 2022-12-27 NOTE — CARE COORDINATION
Phone call to Tyrel, admissions staff at [facility] who reported they do not typically accept individuals who are younger than 54 yrs of age and do not have the staff to assist with his mental health needs. Maryanne stated this SW could send Pt's documentation to them and they can review and let this SW know if they would consider accepting him into SNF. SW will route note to PCP office to request they send copy of demographics, medication list and H&P to Tyrel at [phone]. KOLE plan of care:  SW will follow up with Pt and facility on 12/29.

## 2022-12-27 NOTE — PROGRESS NOTES
Negative Pressure Wound Therapy    NAME:  Yifan Winkler OF BIRTH:  1980  MEDICAL RECORD NUMBER:  [id number]  DATE:  12/27/2022    Applied Negative Pressure to right amp site on lower BKA wound(s)/ulcer(s). [x] Applied skin barrier prep to serenity-wound. [x] Cut strips of plastic drape to picture frame wound so that serenity-wound is     covered with the drape. [x] If bridging dressing to less prominent site, cover any intact skin that will come in contact with the Negative Pressure Therapy sponge, gauze or channel drain with plastic drape. The sponge should never touch intact skin. [x] Cut sponge, gauze or channel drain to size which will fit into the wound/ulcer bed without being forced. [x] Be sure the sponge is large enough to hold the entire round plastic flange which is attached to the tubing. Never allow flange to be larger than the sponge or it will produce suction damaging intact skin. Total number of individual pieces of foam used within the wound bed: 1    [x] If bridging the dressing away from the primary site, be sure the bridge leads to a piece of sponge large enough to hold the entire flange without allowing any of the flange to overlap onto intact skin. [x] Covered sponge, gauze or channel drain with plastic drape. [x] Cut a hole in this plastic drape directly over the sponge the same size as the plastic drain tubing. [x] Removed plastic liner from flange and apply it directly over the hole you cut. [x] Removed the plastic cover from the flange. [x] Attached the tubing to the wound/ulcer Negative Pressure Therapy and turn it on to be sure a vacuum is created and that there are no leaks. [x] If air leaks occur, use plastic drape to patch them. [x] Secured Negative Pressure Therapy dressing with ace wrap loosely if located on an extremity. Maintain tubing outside of ace wrap. Tubing must not exert pressure on intact skin.     Applied per [facility] Guidelines      Electronically signed by Sarah Ma LPN on 81/43/6105 at 10:06 AM

## 2022-12-28 ENCOUNTER — CARE COORDINATION (OUTPATIENT)
Dept: CARE COORDINATION | Age: 42
End: 2022-12-28

## 2022-12-28 NOTE — CARE COORDINATION
Phone call to Kellie at [facility]. to follow up regarding referral sent on 12/22. Kellie reported the Director has been out as he has a broken foot and she reported she needs to obtain permission regarding this referral.      Attempted phone call to Pt to provide update and follow up regarding [place] [address] services. No answer, voicemail message left requesting return call, contact number provided. KOLE plan of care: KOLE will follow up with Kellie in one week 1/4 regarding referral status.

## 2022-12-29 ENCOUNTER — CARE COORDINATION (OUTPATIENT)
Dept: CARE COORDINATION | Age: 42
End: 2022-12-29

## 2022-12-29 NOTE — CARE COORDINATION
Phone call to Pt to follow up regarding efforts to seek placement for Pt per his request.  Pt reported he has been thinking more about it and no longer wishes to be placed in SNF, would rather wait until wound vac is removed to look into AL placement again. Discussed option to look into low income housing. Pt reported he did make several calls and never received a response. SW offered to assist pt in calling low income housing and Pt was in agreement. Phone call to [facility] to notify admissions staffMaryanne that Pt changed his mind and no longer wishes to be placed in SNF. No answer, voicemail message left to provide update. Phone call to Kellie with [facility] and [facility]. to provide update that Pt no longer wishes to be placed in SNF. KOLE plan of care: Scheduled to contact Pt on 1/11 @ 10am to merge calls to low income housing properties to assist in identifying housing.

## 2023-01-02 NOTE — CARE COORDINATION
Attempted phone call to Pt to follow up regarding health insurance. Pt answered stating he is at a wound care appointment. SW discussed plan to try to call him later in the day. Pt was in agreement. Phone call to Pt. Pt reported he has made several attempts to contact [facility] service stating he was \"given the run around\". Discussed with Pt the option to contact [address] [place] [address] and also provided him with contact information for [address] [place] [address] [phone]. KOLE plan of care:  SW Will follow up with Pt in 3 days regarding health insurance. These results are normal.

## 2023-01-03 ENCOUNTER — HOSPITAL ENCOUNTER (OUTPATIENT)
Dept: WOUND CARE | Age: 43
Discharge: HOME | End: 2023-01-03
Payer: MEDICARE

## 2023-01-03 ENCOUNTER — CARE COORDINATION (OUTPATIENT)
Dept: CARE COORDINATION | Age: 43
End: 2023-01-03

## 2023-01-03 VITALS
TEMPERATURE: 97.2 F | HEART RATE: 80 BPM | RESPIRATION RATE: 18 BRPM | SYSTOLIC BLOOD PRESSURE: 151 MMHG | DIASTOLIC BLOOD PRESSURE: 98 MMHG

## 2023-01-03 DIAGNOSIS — Z89.432 PARTIAL NONTRAUMATIC AMPUTATION OF FOOT, LEFT (HCC): ICD-10-CM

## 2023-01-03 DIAGNOSIS — L97.422 DIABETIC ULCER OF LEFT MIDFOOT ASSOCIATED WITH TYPE 2 DIABETES MELLITUS, WITH FAT LAYER EXPOSED (HCC): Primary | ICD-10-CM

## 2023-01-03 DIAGNOSIS — L97.522 DIABETIC ULCER OF TOE OF LEFT FOOT ASSOCIATED WITH TYPE 2 DIABETES MELLITUS, WITH FAT LAYER EXPOSED (HCC): ICD-10-CM

## 2023-01-03 DIAGNOSIS — E11.621 DIABETIC ULCER OF LEFT MIDFOOT ASSOCIATED WITH TYPE 2 DIABETES MELLITUS, WITH FAT LAYER EXPOSED (HCC): Primary | ICD-10-CM

## 2023-01-03 DIAGNOSIS — L03.116 CELLULITIS OF LEFT FOOT: ICD-10-CM

## 2023-01-03 DIAGNOSIS — E11.621 DIABETIC ULCER OF TOE OF LEFT FOOT ASSOCIATED WITH TYPE 2 DIABETES MELLITUS, WITH FAT LAYER EXPOSED (HCC): ICD-10-CM

## 2023-01-03 PROCEDURE — 11044 DBRDMT BONE 1ST 20 SQ CM/<: CPT

## 2023-01-03 PROCEDURE — 11044 DBRDMT BONE 1ST 20 SQ CM/<: CPT | Performed by: SURGERY

## 2023-01-03 PROCEDURE — 97605 NEG PRS WND THER DME<=50SQCM: CPT

## 2023-01-03 RX ORDER — LIDOCAINE HYDROCHLORIDE 20 MG/ML
JELLY TOPICAL ONCE
OUTPATIENT
Start: 2023-01-03 | End: 2023-01-03

## 2023-01-03 RX ORDER — LIDOCAINE 50 MG/G
OINTMENT TOPICAL ONCE
OUTPATIENT
Start: 2023-01-03 | End: 2023-01-03

## 2023-01-03 RX ORDER — LIDOCAINE HYDROCHLORIDE 40 MG/ML
SOLUTION TOPICAL ONCE
OUTPATIENT
Start: 2023-01-03 | End: 2023-01-03

## 2023-01-03 RX ORDER — BETAMETHASONE DIPROPIONATE 0.05 %
OINTMENT (GRAM) TOPICAL ONCE
OUTPATIENT
Start: 2023-01-03 | End: 2023-01-03

## 2023-01-03 RX ORDER — LIDOCAINE 40 MG/G
CREAM TOPICAL ONCE
OUTPATIENT
Start: 2023-01-03 | End: 2023-01-03

## 2023-01-03 RX ORDER — GINSENG 100 MG
CAPSULE ORAL ONCE
OUTPATIENT
Start: 2023-01-03 | End: 2023-01-03

## 2023-01-03 RX ORDER — BACITRACIN, NEOMYCIN, POLYMYXIN B 400; 3.5; 5 [USP'U]/G; MG/G; [USP'U]/G
OINTMENT TOPICAL ONCE
OUTPATIENT
Start: 2023-01-03 | End: 2023-01-03

## 2023-01-03 RX ORDER — GENTAMICIN SULFATE 1 MG/G
OINTMENT TOPICAL ONCE
OUTPATIENT
Start: 2023-01-03 | End: 2023-01-03

## 2023-01-03 RX ORDER — CLOBETASOL PROPIONATE 0.5 MG/G
OINTMENT TOPICAL ONCE
OUTPATIENT
Start: 2023-01-03 | End: 2023-01-03

## 2023-01-03 RX ORDER — BACITRACIN ZINC AND POLYMYXIN B SULFATE 500; 1000 [USP'U]/G; [USP'U]/G
OINTMENT TOPICAL ONCE
OUTPATIENT
Start: 2023-01-03 | End: 2023-01-03

## 2023-01-03 ASSESSMENT — PAIN SCALES - GENERAL: PAINLEVEL_OUTOF10: 0

## 2023-01-03 NOTE — PROGRESS NOTES
Wound Care Center Progress Note With Procedure    Shira Lincoln  AGE: 43 y.o. GENDER: male  : 1980  EPISODE DATE:  1/3/2023     Subjective:     Chief Complaint   Patient presents with    Wound Check     ble         HISTORY of PRESENT ILLNESS      Shira Lincoln is a 43 y.o. male who presents today for wound evaluation of Chronic non-healing/non-surgical ulcer(s) of the Right bka stump. The ulcer is of moderate severity. The underlying cause of the wound is h/o chronic diabetic, pressure and non-healing surgical ulcer with infection s/p BKA. 22: Since his last visit to the wound clinic 22 the patient was hospitalized -22) at [facility] with necrotizing fascitis right lower leg with resulting right BKA. 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 Addie Hernandez. She placed a wound VAC to on the stump at that time. On 2022 Dr. Abhay Spence performed a primary closure of his right BKA stump.         Wound Pain Timing/Severity: intermittent  Quality of pain: sharp  Severity of pain:  3 / 10   Modifying Factors: diabetes and chronic pressure  Associated Signs/Symptoms: drainage and pain        PAST MEDICAL HISTORY        Diagnosis Date    Abscess of left foot 2022    Anemia associated with acute blood loss 2022    Callus of foot     Right foot - see's Dr. Jordan Mins    Cellulitis of left foot 2022    Diabetic ulcer of left midfoot associated with type 2 diabetes mellitus, with muscle involvement without evidence of necrosis ([address] 2022    Diabetic ulcer of left midfoot associated with type 2 diabetes mellitus, with necrosis of muscle ([address] 2021    Diabetic ulcer of right midfoot associated with type 2 diabetes mellitus, with fat layer exposed ([address] 2020    Dizziness     positional    Essential hypertension     Follows with PCP    Hyperlipidemia     Lumbar radiculopathy     Septic embolism ([address] 2020    Subacute osteomyelitis of left foot ([address] 4/22/2022       PAST SURGICAL HISTORY    Past Surgical History:   Procedure Laterality Date    ACHILLES TENDON SURGERY Right 1/8/2021    RIGHT ACHILLES TENDON LENGTHENING REPAIR performed by Lowry Barthel, DPM at [facility]  03/2018    [address] [place] [address] 51    DENTAL SURGERY      teeth extractions -half per patient    HERNIA REPAIR Bilateral 5/27/2020    BIALTERAL HERNIA INGUINAL REPAIR performed by Choima Carter MD at [address] Right 9/2/2022    LEG AMPUTATION BELOW KNEE performed by Saran Arevalo MD at [address] [facility] Right 9/5/2022    LEG AMPUTATION BELOW KNEE REVISION performed by Saran Arevalo MD at [facility] 30 2018    ID OFFICE/OUTPT VISIT,PROCEDURE ONLY Left 4/17/2018    L5-S1 HEMILAMINECTOMY, REMOVAL OF DISC LEFT SIDE performed by Yuriy Alvarez MD at [address] Right 1/8/2021    RIGHT TRANSMETATARSAL TOE AMPUTATION performed by Lowry Barthel, DPM at [address] Left 4/23/2022    LEFT RAY GREAT TOE AMPUTATION performed by Martin Workman MD at [facility]    Family History   Problem Relation Age of Onset    Heart Disease Father     Diabetes Father     Diabetes Mother     Heart Disease Mother     Other Mother     Kidney Disease Mother     Heart Attack Mother        SOCIAL HISTORY    Social History     Tobacco Use    Smoking status: Never    Smokeless tobacco: Never   Vaping Use    Vaping Use: Never used   Substance Use Topics    Alcohol use: Yes     Comment: occasionally    Drug use: No       ALLERGIES    No Known Allergies    MEDICATIONS    Current Outpatient Medications on File Prior to Encounter   Medication Sig Dispense Refill    aspirin 81 MG chewable tablet Take 1 tablet by mouth daily Take 81 mg by mouth daily 30 tablet 2    prazosin (MINIPRESS) 1 MG capsule Take 1 capsule by mouth nightly 30 capsule 0    QUEtiapine (SEROQUEL) 200 MG tablet Take 1 tablet by mouth 2 times daily 60 tablet 0    carvedilol (COREG) 12.5 MG tablet Take 1 tablet by mouth 2 times daily (with meals) 60 tablet 0    losartan (COZAAR) 100 MG tablet Take 1 tablet by mouth daily 30 tablet 0    clopidogrel (PLAVIX) 75 MG tablet Take 1 tablet by mouth daily 30 tablet 0    pantoprazole (PROTONIX) 20 MG tablet Take 1 tablet by mouth every morning (before breakfast) 30 tablet 0    omeprazole (PRILOSEC) 20 MG delayed release capsule Take 1 capsule by mouth once daily in the morning 30 capsule 0    FLUoxetine (PROZAC) 40 MG capsule Take 2 capsules by mouth daily 30 capsule 0    insulin glargine (LANTUS SOLOSTAR) 100 UNIT/ML injection pen Inject 30 Units into the skin nightly 9 mL 0    atorvastatin (LIPITOR) 40 MG tablet Take 1 tablet by mouth nightly 30 tablet 0    hydrOXYzine pamoate (VISTARIL) 50 MG capsule Take 50 mg by mouth every 6 hours as needed for Anxiety      ondansetron (ZOFRAN) 4 MG tablet Take 1 tablet by mouth daily as needed for Nausea or Vomiting 30 tablet 0    pantoprazole (PROTONIX) 40 MG tablet       docusate sodium (COLACE, DULCOLAX) 100 MG CAPS Take 100 mg by mouth daily (Patient not taking: No sig reported)      [DISCONTINUED] metFORMIN (GLUCOPHAGE) 500 MG tablet Take 2 tablets by mouth 2 times daily (with meals) Indications: Start tomorrow 03/14 360 tablet 1    [DISCONTINUED] metoclopramide (REGLAN) 10 MG tablet Take 1 tablet by mouth 4 times daily (before meals and nightly) 120 tablet 3    sucralfate (CARAFATE) 1 GM/10ML suspension Take 10 mLs by mouth 4 times daily for 7 days (Patient not taking: Reported on 11/11/2022) 414 mL 0    glyBURIDE (DIABETA) 5 MG tablet Take 2 tablets by mouth in the morning and at bedtime (Patient not taking: No sig reported) 120 tablet 1    Dulaglutide 1.5 MG/0.5ML SOPN Inject 1.5 mg into the skin once a week (Patient not taking: No sig reported) 5 pen 3    Alcohol Swabs PADS       blood glucose monitor strips Test 2 times a day & as needed for symptoms of irregular blood glucose. Dispense sufficient amount for indicated testing frequency plus additional to accommodate PRN testing needs. (Patient not taking: No sig reported) 100 strip 0    [DISCONTINUED] pioglitazone (ACTOS) 30 MG tablet Take 1 tablet by mouth daily 90 tablet 1    blood glucose monitor kit and supplies Dispense sufficient amount for indicated testing frequency plus additional to accommodate PRN testing needs. Dispense all needed supplies to include: monitor, strips, lancing device, lancets, control solutions, alcohol swabs. (Patient not taking: No sig reported) 1 kit 0    FreeStyle Lancets MISC 1 each by Does not apply route daily (Patient not taking: No sig reported) 100 each 3    [DISCONTINUED] acetaminophen (TYLENOL) 325 MG tablet Take 2 tablets by mouth every 4 hours as needed for Pain or Fever 120 tablet 3     No current facility-administered medications on file prior to encounter. REVIEW OF SYSTEMS    Pertinent items are noted in HPI. Constitutional: Negative for systemic symptoms including fever, chills and malaise. Objective:      BP (!) 151/98   Pulse 80   Temp 97.2 °F (36.2 °C) (Temporal)   Resp 18     PHYSICAL EXAM      General: The patient is in no acute distress. Mental status:  Patient is appropriate, is  oriented to place and plan of care.   Dermatologic exam: Visual inspection of the periwound reveals the skin to be normal in turgor and texture  Wound exam: see wound description below in procedure note      Assessment:      Problem List Items Addressed This Visit          Endocrine    WD-Diabetic ulcer of left midfoot associated with type 2 diabetes mellitus, with fat layer exposed ([address] - Primary    Relevant Orders    Initiate Outpatient Wound Care Protocol    Diabetic ulcer of toe of left foot associated with type 2 diabetes mellitus, with fat layer exposed ([address]    Relevant Orders    Initiate Outpatient Wound Care Protocol       Other    Cellulitis of left foot    Relevant Orders    Initiate Outpatient Wound Care Protocol    WD-Partial nontraumatic amputation of foot, left [facility])    Relevant Orders    Initiate Outpatient Wound Care Protocol     Procedure Note    Indications:  Based on my examination of this patient's wound(s) today, sharp excision into necrotic bone is required to promote healing and evaluate the extent of previous healing. Performed by: Abdulaziz Mayers DO    Consent obtained: Yes    Time out taken:  Yes          Debridement:Excisional Debridement    Using curette and tissue nippers the wound(s) was/were sharply debrided down through and including the removal of bone. Devitalized Tissue Debrided:  biofilm, slough, and necrotic/eschar    Pre Debridement Measurements:  Are located in the Wound Documentation Flow Sheet    All active wounds listed below with today's date are evaluated  Wound(s)    debrided this date include # : 1     Post  Debridement Measurements:  Negative Pressure Wound Therapy (Active)   Number of days: 222       Negative Pressure Wound Therapy Leg Anterior; Lower;Proximal;Right (Active)   Unit Type KCI 01/03/23 0946   Dressing Type Black Foam 01/03/23 0946   Number of pieces used 3 01/03/23 0946   Number of pieces removed 1 12/27/22 1005   Cycle Continuous 01/03/23 0946   Target Pressure (mmHg) 125 01/03/23 0946   Intensity 5 01/03/23 0946   Canister changed? Yes 01/03/23 0946   Dressing Status New dressing applied 01/03/23 0946   Dressing Changed Changed/New 01/03/23 0946   Drainage Amount Moderate 01/03/23 0946   Drainage Description Sanguinous 01/03/23 0946   Dressing Change Due 12/06/22 12/20/22 0938   Wound Assessment Granulation tissue 01/03/23 0946   Rosalie-wound Assessment Intact;Fragile 01/03/23 0946   Odor None 12/27/22 1005   Number of days: 69       Wound 10/20/22 #3 right amp site.  (Active)   Wound Image   12/27/22 0917   Wound Etiology Non-Healing Surgical 01/03/23 0946   Dressing Status New dressing applied;Clean;Dry; Intact 01/03/23 0946   Wound Cleansed Wound cleanser 01/03/23 0915   Offloading for Diabetic Foot Ulcers Other (comment) 01/03/23 0946   Wound Length (cm) 1 cm 01/03/23 0915   Wound Width (cm) 6.2 cm 01/03/23 0915   Wound Depth (cm) 1.7 cm 01/03/23 0915   Wound Surface Area (cm^2) 6.2 cm^2 01/03/23 0915   Change in Wound Size % (l*w) -10.71 01/03/23 0915   Wound Volume (cm^3) 10.54 cm^3 01/03/23 0915   Wound Healing % -371 01/03/23 0915   Post-Procedure Length (cm) 1 cm 01/03/23 0938   Post-Procedure Width (cm) 6.2 cm 01/03/23 0938   Post-Procedure Depth (cm) 1.7 cm 01/03/23 0938   Post-Procedure Surface Area (cm^2) 6.2 cm^2 01/03/23 0938   Post-Procedure Volume (cm^3) 10.54 cm^3 01/03/23 0938   Distance Tunneling (cm) 0 cm 01/03/23 0915   Tunneling Position ___ O'Clock 0 01/03/23 0915   Undermining Starts ___ O'Clock 0 01/03/23 0915   Undermining Ends___ O'Clock 0 01/03/23 0915   Undermining Maxium Distance (cm) 0 01/03/23 0915   Wound Assessment Slough;[place]/red 01/03/23 0915   Drainage Amount Moderate 01/03/23 0915   Drainage Description Serosanguinous; Serous 01/03/23 0915   Odor None 01/03/23 0915   Rosalie-wound Assessment Intact 01/03/23 0915   Margins Undefined edges 01/03/23 0915   Wound Thickness Description not for Pressure Injury Full thickness 01/03/23 0915   Number of days: 74       Percent of Wound(s) Debrided: approximately 100%    Total  Area  Debrided:  6.2 sq cm     Bleeding:  Minimal    Hemostasis Achieved:  by pressure and by silver nitrate stick    Procedural Pain:  3  / 10     Post Procedural Pain:  0 / 10     Response to treatment:  Well tolerated by patient. Status of wound progress and description from last visit:   improving. Plan:       Discharge Instructions         PHYSICIAN ORDERS AND DISCHARGE INSTRUCTIONS     NOTE: Upon discharge from the [address], you will receive a patient experience survey.  We would be grateful if you would take the time to fill this survey out. Wound cleansing:              Do not scrub or use excessive force. Wash hands with soap and water before and after dressing changes. Prior to applying a clean dressing, cleanse wound with normal saline, wound cleanser, or mild soap and water. Ask the physician or nurse before getting the wound(s) wet in a shower     Daily Wound management:              Keep weight off wounds and reposition every 2 hours. Avoid standing for long periods of time. Apply wraps/stockings in AM and remove at bedtime. If swelling is present, elevate legs to the level of the heart or above for 30 minutes 4-5 times a day and/or when sitting. When taking antibiotics take entire prescription as ordered by physician do not stop taking until medicine is all gone. Wound Care Notes:  Rx: Lauren Burkett  Apply for grafts 05/11/22: aLL GRAFTS approved 06/08/22 for Left Toe amp site  Reapply for graft 08/22/22 for Left toes amp site   YADY's   Right 1.07      Left    1.1           Date: 08/15/22   Home Care Discontinued. Needs Nurse visit         Grafts Left Foot Amp Site  Puraply #1 Graft #1 4x4 fenestrated 06/15/22   Puraply #2 Graft #2 4x4 fenestrated 06/22/22                                    Orders for this week: 1/3/23     FAX ORDERS TO [facility]! Left Plantar Foot wound -- Clean with soap and water, pat dry. Apply A&D  Wrap with Conform and Coban  Leave in place for 1 week. ((((HOME CARE - PLEASE DO NOT REMOVE THE LEFT LEG DRESSING))))        WOUND VAC THERAPY: Right Leg Amp Site  DUODERM TO PERIWOUND FOR PROTECTION. APPLY SORBACT OVER BONE, STIMULEN and anasept spray TO WOUND BED AND BLACK FOAM TO WOUND BED (be sure to tuck foam into deeper area on lateral aspect of wound). SECURE VAC DRESSING WITH DRAPE.      SET WOUND VAC  CONTINUOUS SUCTION. CANISTER CHANGE WEEKLY OR ACCORDING TO VOLUME OF DRAINAGE. Wrap with ACE wrap, but do not compress tubing against skin. Home Care to change vac on . WOUND CARE CENTER WILL CHANGE ON TUESDAY (IF VAC SUPPLIES SENT - NEED CANISTER AND BLACK FOAM DRESSING KIT). Follow Up Instructions: At the [address] [place] [address] Road in 1 week: Tuesday   Primary Wound Care Provider: Philomena Lopes CNP   Call  for any questions or concerns. Central [phone]        Treatment Note Wound 10/20/22 #3 right amp site. -Dressing/Treatment:  (Anasept, Stimulen, Sorbact, Duoderm, Black Foam, Drape)    Written Patient Dismissal Instructions Given            Electronically signed by Gema Reno DO on 1/3/2023 at 9:53 AM

## 2023-01-03 NOTE — WOUND CARE
Negative Pressure Wound Therapy    NAME:  Tiffanie Donis OF BIRTH:  1980  MEDICAL RECORD NUMBER:  [id number]  DATE:  1/3/2023    Applied Negative Pressure to amp site on right leg  wound(s)/ulcer(s). [x] Applied skin barrier prep to serenity-wound. [x] Cut strips of plastic drape to picture frame wound so that serenity-wound is     covered with the drape. [x] If bridging dressing to less prominent site, cover any intact skin that will come in contact with the Negative Pressure Therapy sponge, gauze or channel drain with plastic drape. The sponge should never touch intact skin. [x] Cut sponge, gauze or channel drain to size which will fit into the wound/ulcer bed without being forced. [x] Be sure the sponge is large enough to hold the entire round plastic flange which is attached to the tubing. Never allow flange to be larger than the sponge or it will produce suction damaging intact skin. Total number of individual pieces of foam used within the wound bed: 3    [x] If bridging the dressing away from the primary site, be sure the bridge leads to a piece of sponge large enough to hold the entire flange without allowing any of the flange to overlap onto intact skin. [x] Covered sponge, gauze or channel drain with plastic drape. [x] Cut a hole in this plastic drape directly over the sponge the same size as the plastic drain tubing. [x] Removed plastic liner from flange and apply it directly over the hole you cut. [x] Removed the plastic cover from the flange. [x] Attached the tubing to the wound/ulcer Negative Pressure Therapy and turn it on to be sure a vacuum is created and that there are no leaks. [x] If air leaks occur, use plastic drape to patch them. [x] Secured Negative Pressure Therapy dressing with ace wrap loosely if located on an extremity. Maintain tubing outside of ace wrap. Tubing must not exert pressure on intact skin.     Applied per  Guidelines      Electronically signed by Madina Mandujano LPN on 9/7/7279 at 4:97 AM

## 2023-01-03 NOTE — CARE COORDINATION
Attempted phone call to Pt to remind him of appointment scheduled for 1/4. No answer, voicemail message left reminding Pt of VV with Dr. Culver(psychologist) scheduled for 1/4.      KOLE plan of care:  SW will follow up with Pt on 1/11 regarding housing No

## 2023-01-03 NOTE — DISCHARGE INSTRUCTIONS
PHYSICIAN ORDERS AND DISCHARGE INSTRUCTIONS     NOTE: Upon discharge from the [address], you will receive a patient experience survey. We would be grateful if you would take the time to fill this survey out. Wound cleansing:              Do not scrub or use excessive force. Wash hands with soap and water before and after dressing changes. Prior to applying a clean dressing, cleanse wound with normal saline, wound cleanser, or mild soap and water. Ask the physician or nurse before getting the wound(s) wet in a shower     Daily Wound management:              Keep weight off wounds and reposition every 2 hours. Avoid standing for long periods of time. Apply wraps/stockings in AM and remove at bedtime. If swelling is present, elevate legs to the level of the heart or above for 30 minutes 4-5 times a day and/or when sitting. When taking antibiotics take entire prescription as ordered by physician do not stop taking until medicine is all gone. Wound Care Notes:  Rx: Darrel Torres  Apply for grafts 05/11/22: aLL GRAFTS approved 06/08/22 for Left Toe amp site  Reapply for graft 08/22/22 for Left toes amp site   YADY's   Right 1.07      Left    1.1           Date: 08/15/22   Home Care Discontinued. Needs Nurse visit         Grafts Left Foot Amp Site  Puraply #1 Graft #1 4x4 fenestrated 06/15/22   Puraply #2 Graft #2 4x4 fenestrated 06/22/22                                    Orders for this week: 1/3/23     FAX ORDERS TO [facility]! Left Plantar Foot wound -- Clean with soap and water, pat dry. Apply A&D  Wrap with Conform and Coban  Leave in place for 1 week. ((((HOME CARE - PLEASE DO NOT REMOVE THE LEFT LEG DRESSING))))        WOUND VAC THERAPY: Right Leg Amp Site  DUODERM TO PERIWOUND FOR PROTECTION.  APPLY SORBACT OVER BONE, STIMULEN and anasept spray TO WOUND BED AND BLACK FOAM TO WOUND BED (be sure to tuck foam into deeper area on lateral aspect of wound). SECURE VAC DRESSING WITH DRAPE. SET WOUND VAC  CONTINUOUS SUCTION. CANISTER CHANGE WEEKLY OR ACCORDING TO VOLUME OF DRAINAGE. Wrap with ACE wrap, but do not compress tubing against skin. Home Care to change vac on . WOUND CARE CENTER WILL CHANGE ON TUESDAY (IF VAC SUPPLIES SENT - NEED CANISTER AND BLACK FOAM DRESSING KIT). Follow Up Instructions: At the [address] in 1 week: Tuesday   Primary Wound Care Provider: Sima Barfield CNP   Call  for any questions or concerns.   Central [phone]

## 2023-01-10 ENCOUNTER — HOSPITAL ENCOUNTER (OUTPATIENT)
Dept: WOUND CARE | Age: 43
Discharge: HOME | End: 2023-01-10
Payer: MEDICARE

## 2023-01-10 ENCOUNTER — TELEMEDICINE (OUTPATIENT)
Dept: FAMILY MEDICINE CLINIC | Age: 43
End: 2023-01-10
Payer: MEDICARE

## 2023-01-10 ENCOUNTER — CARE COORDINATION (OUTPATIENT)
Dept: CARE COORDINATION | Age: 43
End: 2023-01-10

## 2023-01-10 VITALS
HEART RATE: 92 BPM | DIASTOLIC BLOOD PRESSURE: 85 MMHG | SYSTOLIC BLOOD PRESSURE: 166 MMHG | RESPIRATION RATE: 18 BRPM | TEMPERATURE: 97.6 F

## 2023-01-10 DIAGNOSIS — Z59.82 TRANSPORTATION INSECURITY: ICD-10-CM

## 2023-01-10 DIAGNOSIS — E78.5 HYPERLIPIDEMIA, UNSPECIFIED HYPERLIPIDEMIA TYPE: ICD-10-CM

## 2023-01-10 DIAGNOSIS — G54.6 PHANTOM PAIN AFTER AMPUTATION OF LOWER EXTREMITY (HCC): ICD-10-CM

## 2023-01-10 DIAGNOSIS — Z89.432 PARTIAL NONTRAUMATIC AMPUTATION OF FOOT, LEFT (HCC): ICD-10-CM

## 2023-01-10 DIAGNOSIS — F10.920 ACUTE ALCOHOLIC INTOXICATION WITHOUT COMPLICATION (HCC): ICD-10-CM

## 2023-01-10 DIAGNOSIS — L97.422 DIABETIC ULCER OF LEFT MIDFOOT ASSOCIATED WITH TYPE 2 DIABETES MELLITUS, WITH FAT LAYER EXPOSED (HCC): Primary | ICD-10-CM

## 2023-01-10 DIAGNOSIS — E11.621 DIABETIC ULCER OF LEFT MIDFOOT ASSOCIATED WITH TYPE 2 DIABETES MELLITUS, WITH FAT LAYER EXPOSED (HCC): Primary | ICD-10-CM

## 2023-01-10 DIAGNOSIS — E11.621 DIABETIC ULCER OF TOE OF LEFT FOOT ASSOCIATED WITH TYPE 2 DIABETES MELLITUS, WITH FAT LAYER EXPOSED (HCC): ICD-10-CM

## 2023-01-10 DIAGNOSIS — I10 ESSENTIAL HYPERTENSION: Primary | ICD-10-CM

## 2023-01-10 DIAGNOSIS — K21.9 GASTROESOPHAGEAL REFLUX DISEASE WITHOUT ESOPHAGITIS: ICD-10-CM

## 2023-01-10 DIAGNOSIS — I82.452 ACUTE DEEP VEIN THROMBOSIS (DVT) OF LEFT PERONEAL VEIN (HCC): ICD-10-CM

## 2023-01-10 DIAGNOSIS — R11.2 INTRACTABLE VOMITING WITH NAUSEA: ICD-10-CM

## 2023-01-10 DIAGNOSIS — E11.40 TYPE 2 DIABETES MELLITUS WITH DIABETIC NEUROPATHY, WITHOUT LONG-TERM CURRENT USE OF INSULIN (HCC): ICD-10-CM

## 2023-01-10 DIAGNOSIS — L97.522 DIABETIC ULCER OF TOE OF LEFT FOOT ASSOCIATED WITH TYPE 2 DIABETES MELLITUS, WITH FAT LAYER EXPOSED (HCC): ICD-10-CM

## 2023-01-10 DIAGNOSIS — L03.116 CELLULITIS OF LEFT FOOT: ICD-10-CM

## 2023-01-10 DIAGNOSIS — F41.9 ANXIETY: ICD-10-CM

## 2023-01-10 DIAGNOSIS — F33.1 MAJOR DEPRESSIVE DISORDER, RECURRENT, MODERATE (HCC): ICD-10-CM

## 2023-01-10 PROBLEM — E88.89 KETOSIS (HCC): Status: ACTIVE | Noted: 2023-01-10

## 2023-01-10 PROCEDURE — 6370000000 HC RX 637 (ALT 250 FOR IP): Performed by: NURSE PRACTITIONER

## 2023-01-10 PROCEDURE — 11042 DBRDMT SUBQ TIS 1ST 20SQCM/<: CPT

## 2023-01-10 PROCEDURE — 11042 DBRDMT SUBQ TIS 1ST 20SQCM/<: CPT | Performed by: NURSE PRACTITIONER

## 2023-01-10 PROCEDURE — 97605 NEG PRS WND THER DME<=50SQCM: CPT

## 2023-01-10 PROCEDURE — 99423 OL DIG E/M SVC 21+ MIN: CPT

## 2023-01-10 RX ORDER — PRAZOSIN HYDROCHLORIDE 1 MG/1
1 CAPSULE ORAL NIGHTLY
Qty: 30 CAPSULE | Refills: 5 | Status: SHIPPED | OUTPATIENT
Start: 2023-01-10

## 2023-01-10 RX ORDER — CLOBETASOL PROPIONATE 0.5 MG/G
OINTMENT TOPICAL ONCE
OUTPATIENT
Start: 2023-01-10 | End: 2023-01-10

## 2023-01-10 RX ORDER — LIDOCAINE HYDROCHLORIDE 40 MG/ML
SOLUTION TOPICAL ONCE
Status: COMPLETED | OUTPATIENT
Start: 2023-01-10 | End: 2023-01-10

## 2023-01-10 RX ORDER — LIDOCAINE HYDROCHLORIDE 40 MG/ML
SOLUTION TOPICAL ONCE
OUTPATIENT
Start: 2023-01-10 | End: 2023-01-10

## 2023-01-10 RX ORDER — CLOPIDOGREL BISULFATE 75 MG/1
75 TABLET ORAL DAILY
Qty: 30 TABLET | Refills: 5 | Status: SHIPPED | OUTPATIENT
Start: 2023-01-10

## 2023-01-10 RX ORDER — OMEPRAZOLE 20 MG/1
CAPSULE, DELAYED RELEASE ORAL
Qty: 30 CAPSULE | Refills: 5 | Status: CANCELLED | OUTPATIENT
Start: 2023-01-10

## 2023-01-10 RX ORDER — LIDOCAINE HYDROCHLORIDE 20 MG/ML
JELLY TOPICAL ONCE
OUTPATIENT
Start: 2023-01-10 | End: 2023-01-10

## 2023-01-10 RX ORDER — QUETIAPINE FUMARATE 200 MG/1
200 TABLET, FILM COATED ORAL 2 TIMES DAILY
Qty: 60 TABLET | Refills: 5 | Status: SHIPPED | OUTPATIENT
Start: 2023-01-10

## 2023-01-10 RX ORDER — LIDOCAINE 40 MG/G
CREAM TOPICAL ONCE
OUTPATIENT
Start: 2023-01-10 | End: 2023-01-10

## 2023-01-10 RX ORDER — BLOOD PRESSURE TEST KIT
1 KIT MISCELLANEOUS DAILY
Qty: 1 KIT | Refills: 0 | Status: SHIPPED | OUTPATIENT
Start: 2023-01-10

## 2023-01-10 RX ORDER — PANTOPRAZOLE SODIUM 20 MG/1
20 TABLET, DELAYED RELEASE ORAL
Qty: 30 TABLET | Refills: 5 | Status: CANCELLED | OUTPATIENT
Start: 2023-01-10

## 2023-01-10 RX ORDER — CARVEDILOL 12.5 MG/1
12.5 TABLET ORAL 2 TIMES DAILY WITH MEALS
Qty: 60 TABLET | Refills: 0 | Status: SHIPPED | OUTPATIENT
Start: 2023-01-10

## 2023-01-10 RX ORDER — BACITRACIN, NEOMYCIN, POLYMYXIN B 400; 3.5; 5 [USP'U]/G; MG/G; [USP'U]/G
OINTMENT TOPICAL ONCE
OUTPATIENT
Start: 2023-01-10 | End: 2023-01-10

## 2023-01-10 RX ORDER — GENTAMICIN SULFATE 1 MG/G
OINTMENT TOPICAL ONCE
OUTPATIENT
Start: 2023-01-10 | End: 2023-01-10

## 2023-01-10 RX ORDER — LIDOCAINE 50 MG/G
OINTMENT TOPICAL ONCE
OUTPATIENT
Start: 2023-01-10 | End: 2023-01-10

## 2023-01-10 RX ORDER — ONDANSETRON 4 MG/1
4 TABLET, FILM COATED ORAL DAILY PRN
Qty: 30 TABLET | Refills: 0 | Status: SHIPPED | OUTPATIENT
Start: 2023-01-10

## 2023-01-10 RX ORDER — BETAMETHASONE DIPROPIONATE 0.05 %
OINTMENT (GRAM) TOPICAL ONCE
OUTPATIENT
Start: 2023-01-10 | End: 2023-01-10

## 2023-01-10 RX ORDER — ATORVASTATIN CALCIUM 40 MG/1
40 TABLET, FILM COATED ORAL NIGHTLY
Qty: 30 TABLET | Refills: 5 | Status: SHIPPED | OUTPATIENT
Start: 2023-01-10 | End: 2023-02-09

## 2023-01-10 RX ORDER — PANTOPRAZOLE SODIUM 40 MG/1
40 TABLET, DELAYED RELEASE ORAL
Qty: 30 TABLET | Refills: 5 | Status: SHIPPED | OUTPATIENT
Start: 2023-01-10

## 2023-01-10 RX ORDER — INSULIN GLARGINE 100 [IU]/ML
30 INJECTION, SOLUTION SUBCUTANEOUS NIGHTLY
Qty: 9 ML | Refills: 5 | Status: CANCELLED | OUTPATIENT
Start: 2023-01-10 | End: 2023-04-10

## 2023-01-10 RX ORDER — GINSENG 100 MG
CAPSULE ORAL ONCE
OUTPATIENT
Start: 2023-01-10 | End: 2023-01-10

## 2023-01-10 RX ORDER — LOSARTAN POTASSIUM 100 MG/1
100 TABLET ORAL DAILY
Qty: 30 TABLET | Refills: 5 | Status: SHIPPED | OUTPATIENT
Start: 2023-01-10

## 2023-01-10 RX ORDER — BACITRACIN ZINC AND POLYMYXIN B SULFATE 500; 1000 [USP'U]/G; [USP'U]/G
OINTMENT TOPICAL ONCE
OUTPATIENT
Start: 2023-01-10 | End: 2023-01-10

## 2023-01-10 RX ADMIN — LIDOCAINE HYDROCHLORIDE: 40 SOLUTION TOPICAL at 09:48

## 2023-01-10 SDOH — ECONOMIC STABILITY - TRANSPORTATION SECURITY: TRANSPORTATION INSECURITY: Z59.82

## 2023-01-10 ASSESSMENT — PAIN DESCRIPTION - ORIENTATION: ORIENTATION: RIGHT

## 2023-01-10 ASSESSMENT — ENCOUNTER SYMPTOMS
DIARRHEA: 0
SHORTNESS OF BREATH: 0
CONSTIPATION: 0
ABDOMINAL PAIN: 0
VOMITING: 0
NAUSEA: 0

## 2023-01-10 ASSESSMENT — PAIN SCALES - GENERAL: PAINLEVEL_OUTOF10: 5

## 2023-01-10 ASSESSMENT — PAIN DESCRIPTION - DESCRIPTORS: DESCRIPTORS: STABBING

## 2023-01-10 ASSESSMENT — PAIN DESCRIPTION - PAIN TYPE: TYPE: CHRONIC PAIN

## 2023-01-10 ASSESSMENT — PAIN - FUNCTIONAL ASSESSMENT: PAIN_FUNCTIONAL_ASSESSMENT: PREVENTS OR INTERFERES SOME ACTIVE ACTIVITIES AND ADLS

## 2023-01-10 ASSESSMENT — PAIN DESCRIPTION - FREQUENCY: FREQUENCY: INTERMITTENT

## 2023-01-10 ASSESSMENT — PAIN DESCRIPTION - ONSET: ONSET: ON-GOING

## 2023-01-10 ASSESSMENT — PAIN DESCRIPTION - LOCATION: LOCATION: LEG

## 2023-01-10 NOTE — PROGRESS NOTES
Negative Pressure Wound Therapy    NAME:  Alondra Davalos OF BIRTH:  1980  MEDICAL RECORD NUMBER:  [id number]  DATE:  1/10/2023    Applied Negative Pressure to right amp site wound(s)/ulcer(s). [x] Applied skin barrier prep to serenity-wound. [x] Cut strips of plastic drape to picture frame wound so that serenity-wound is     covered with the drape. [x] If bridging dressing to less prominent site, cover any intact skin that will come in contact with the Negative Pressure Therapy sponge, gauze or channel drain with plastic drape. The sponge should never touch intact skin. [x] Cut sponge, gauze or channel drain to size which will fit into the wound/ulcer bed without being forced. [x] Be sure the sponge is large enough to hold the entire round plastic flange which is attached to the tubing. Never allow flange to be larger than the sponge or it will produce suction damaging intact skin. Total number of individual pieces of foam used within the wound bed: 4    [x] If bridging the dressing away from the primary site, be sure the bridge leads to a piece of sponge large enough to hold the entire flange without allowing any of the flange to overlap onto intact skin. [x] Covered sponge, gauze or channel drain with plastic drape. [x] Cut a hole in this plastic drape directly over the sponge the same size as the plastic drain tubing. [x] Removed plastic liner from flange and apply it directly over the hole you cut. [x] Removed the plastic cover from the flange. [x] Attached the tubing to the wound/ulcer Negative Pressure Therapy and turn it on to be sure a vacuum is created and that there are no leaks. [x] If air leaks occur, use plastic drape to patch them. [x] Secured Negative Pressure Therapy dressing with ace wrap loosely if located on an extremity. Maintain tubing outside of ace wrap. Tubing must not exert pressure on intact skin.     Applied per  Guidelines      Electronically signed by Jelena Diaz LPN on 0/56/9578 at 09:45 AM

## 2023-01-10 NOTE — PROGRESS NOTES
Anny Fraser (:  1980) is a Established patient, here for evaluation of the following:    Assessment & Plan   Below is the assessment and plan developed based on review of pertinent history, physical exam, labs, studies, and medications. 1. Essential hypertension  Elevated readings at wound care. Patient does not have blood pressure cuff at home to monitor, will order this. Patient has not had his medications for hypertension yet today but will take them immediately following this video visit. We will have patient follow-up later this week for labs and blood pressure recheck. -     carvedilol (COREG) 12.5 MG tablet; Take 1 tablet by mouth 2 times daily (with meals), Disp-60 tablet, R-0Normal  -     losartan (COZAAR) 100 MG tablet; Take 1 tablet by mouth daily, Disp-30 tablet, R-5Normal  -     Blood Pressure KIT; DAILY Starting Tue 1/10/2023, Disp-1 kit, R-0, Normal  2. Phantom pain after amputation of lower extremity [facility])  Patient reports that this is more annoying than anything, discussion of possible medication management. Patient reports that this time that he takes OTC as needed acetaminophen which does improve pain levels. 3. Transportation insecurity  Direct message sent to ambulatory /TOMMY W assigned to patient's case concerning lack of transportation to appointments and to obtain prescriptions. 4. Type 2 diabetes mellitus with diabetic neuropathy, without long-term current use of insulin ([facility] [address]  Patient reports significant hypoglycemic episodes especially worse over night. He has been using Lantus inconsistently as a result as he is concerned that blood sugar will drop too low. We will stop Lantus, metformin, glyburide over concerns of hypoglycemia. We will plan a recheck of A1c and continue Trulicity for cardio renal benefits. Advised patient to continue to monitor blood sugars and notify office of all hypoglycemic episodes.   -     dulaglutide (TRULICITY) 1.5 PJ/2.8KR SC injection; Inject 0.5 mLs into the skin once a week, Disp-5 Adjustable Dose Pre-filled Pen Syringe, R-5Normal  -     Hemoglobin A1C; Future  -     Comprehensive Metabolic Panel; Future  5. Gastroesophageal reflux disease without esophagitis  Switched omeprazole to pantoprazole related to interaction between PPI and clopidogrel. -     pantoprazole (PROTONIX) 40 MG tablet; Take 1 tablet by mouth every morning (before breakfast), Disp-30 tablet, R-5Normal  6. Intractable vomiting with nausea  Nausea and vomiting have improved greatly. Minimal nausea intermittently the patient reports that he is able to eat and drink well and has been doing self. -     ondansetron (ZOFRAN) 4 MG tablet; Take 1 tablet by mouth daily as needed for Nausea or Vomiting, Disp-30 tablet, R-0Normal  7. Anxiety  Overall goal is for psychiatry to evaluate and treat for complex anxiety, other mental health issues. Will obtain appropriate transportation and consider additional referrals. -     QUEtiapine (SEROQUEL) 200 MG tablet; Take 1 tablet by mouth 2 times daily, Disp-60 tablet, R-5Normal    8. Major depressive disorder, recurrent, moderate ([facility] [address]  As above. 9. Acute alcoholic intoxication without complication (HCC)  -     prazosin (MINIPRESS) 1 MG capsule; Take 1 capsule by mouth nightly, Disp-30 capsule, R-5Normal    10. Acute deep vein thrombosis (DVT) of left peroneal vein (HCC)  Denies abnormal bleeding/bruising.   -     clopidogrel (PLAVIX) 75 MG tablet; Take 1 tablet by mouth daily, Disp-30 tablet, R-5Normal    11. Hyperlipidemia, unspecified hyperlipidemia type  -     atorvastatin (LIPITOR) 40 MG tablet; Take 1 tablet by mouth nightly, Disp-30 tablet, R-5Normal    Return BP recheck with labs tomorrow. Subjective   HPI    He has been having a lot of pain since is amputation, he continues to follow with wound care every Tuesday and Friday. He is wheeling himself there in his wheelchair.      BP is significantly elevated at wound care earlier today but he has not yet taken his medications. He does not have a BP cuff at home to check this himself- he is asymptomatic. Blood sugars have been staying below 115 in the mornings. He has not been taking Lantus due to hypoglycemic episodes that occur throughout the night. Nausea has been well improved- he has not needed the carafate either. He has been eating well and hydrating. He notes that he has been feeling minimally better about his overall situation. He has been in touch with psychiatry he believes via phone but he is unsure of a name. Review of Systems   Respiratory:  Negative for shortness of breath. Cardiovascular:  Negative for chest pain and palpitations. Gastrointestinal:  Negative for abdominal pain, constipation, diarrhea, nausea and vomiting. Musculoskeletal:         Phantom pain present amputated lower extremity. Neurological:  Negative for dizziness and headaches. Psychiatric/Behavioral:  Positive for sleep disturbance. The patient is nervous/anxious. Objective   Patient-Reported Vitals  Patient-Reported Systolic (Top): 722 mmHg (hasn't taken meds yet)  Patient-Reported Diastolic (Bottom): 134 mmHg       Physical Exam  Vitals and nursing note reviewed. Constitutional:       Appearance: Normal appearance. He is normal weight. HENT:      Head: Normocephalic and atraumatic. Eyes:      General: No scleral icterus. Conjunctiva/sclera: Conjunctivae normal.   Pulmonary:      Effort: Pulmonary effort is normal.   Neurological:      Mental Status: He is alert and oriented to person, place, and time. Mental status is at baseline. Psychiatric:         Attention and Perception: Attention normal.         Mood and Affect: Mood is depressed. Affect is flat. Speech: Speech normal.         Behavior: Behavior normal. Behavior is cooperative. Thought Content:  Thought content normal.         Cognition and Memory: Cognition and memory normal.         Judgment: Judgment normal.     [INSTRUCTIONS:  \"[x]\" Indicates a positive item  \"[]\" Indicates a negative item  -- DELETE ALL ITEMS NOT EXAMINED]    Constitutional: [x] Appears well-developed and well-nourished [x] No apparent distress      [] Abnormal -     Mental status: [x] Alert and awake  [x] Oriented to person/place/time [x] Able to follow commands    [] Abnormal -     Eyes:   EOM    [x]  Normal    [] Abnormal -   Sclera  [x]  Normal    [] Abnormal -          Discharge [x]  None visible   [] Abnormal -     HENT: [x] Normocephalic, atraumatic  [] Abnormal -   [x] Mouth/Throat: Mucous membranes are moist    External Ears [x] Normal  [] Abnormal -    Neck: [x] No visualized mass [] Abnormal -     Pulmonary/Chest: [x] Respiratory effort normal   [x] No visualized signs of difficulty breathing or respiratory distress        [] Abnormal -      Musculoskeletal:   [x] Normal gait with no signs of ataxia         [x] Normal range of motion of neck        [] Abnormal -     Neurological:        [x] No Facial Asymmetry (Cranial nerve 7 motor function) (limited exam due to video visit)          [x] No gaze palsy        [] Abnormal -          Skin:        [x] No significant exanthematous lesions or discoloration noted on facial skin         [] Abnormal -            Psychiatric:       [x] Normal Affect [] Abnormal -        [x] No Hallucinations    Other pertinent observable physical exam findings:-         On this date 1/10/2023 I have spent 30 minutes reviewing previous notes, test results and face to face (virtual) with the patient discussing the diagnosis and importance of compliance with the treatment plan as well as documenting on the day of the visit. Jillian Gallardo, was evaluated through a synchronous (real-time) audio-video encounter. The patient (or guardian if applicable) is aware that this is a billable service, which includes applicable co-pays.  This Virtual Visit was conducted with patient's (and/or legal guardian's) consent. The visit was conducted pursuant to the emergency declaration under the [address] [facility], [address] [facility] authority and the [employer] and [employer] General Act. Patient identification was verified, and a caregiver was present when appropriate. The patient was located at Home: [address]  [place] [address]. Provider was located at [place] [address] ([facility]): [address] [place] [address].         --Adalid Guerra, RAFAEL - CNP

## 2023-01-10 NOTE — PROGRESS NOTES
Wound Care Center Progress Note With Procedure    Guanako Wilkins  AGE: 43 y.o. GENDER: male  : 1980  EPISODE DATE:  1/10/2023     Subjective:     Chief Complaint   Patient presents with    Wound Check     Right amp site         HISTORY of PRESENT ILLNESS      Guanako Wilkins is a 43 y.o. male who presents today for wound evaluation of Chronic diabetic, pressure and non-healing surgical ulcer(s) of the left medial foot and lateral plantar surface. The ulcer is of marked severity. The underlying cause of the wound is diabetes, non-healing surgical. He presents today with a new wound to the right plantar foot that is diabetic and pressure in nature and of moderate severity. The patient has significant underlying medical conditions as below. The patient is having significant depression related to the recent and sudden death of his mother. 10/20/22: The patient was here last 10/4/22 and his right BKA site was well approximated, staples removed and steri strips. The prosthetic supplier was with him and plans had started to get fitted. Unfortunately, the patient fell the same night at home and the wound dehisced. He was evaluated at the ED. The patient then was not seen at the wound clinic for a few weeks as he was hospitalized for suicidal ideation. Today able to probe and visibly see bone. Home Health still in place, will apply for a wound vac. Will also start Bactrim. 22: Since his last visit to the wound clinic 22 the patient was hospitalized -22) at [facility] with necrotizing fascitis right lower leg with resulting right BKA. 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 Skyler Payne. She placed a wound VAC to on the stump at that time. On 2022 Dr. Len Painter performed a primary closure of his right BKA stump.     Wound Pain Timing/Severity: none  Quality of pain: N/A  Severity of pain:  0 / 10   Modifying Factors: diabetes and chronic pressure  Associated Signs/Symptoms: drainage     Diabetes: Yes, on an oral and insulin regimen, last A1c 10.5 as of 7/28/22  Diabetes education provided today:    Diabetes pathoetiology, difference between type 1 and type 2 diabetes, and progressive nature of Type 2 DM. Diabetic Neuropathy: signs and therapy. Foot care: advised to wash feet daily, pat dry and apply lotion at night, avoiding between toes. Need to look at feet daily and report to a physician any signs of inflammation or skin damage. Discussed diabetes shoes and socks. Diabetic management related to wound care    Smoking: Never smoker  Obesity:No  Anticoagulant therapy: No  Immunosuppression: No    Patient educated on the 6 essential components necessary for wound healing: Circulation, Debridements, Proper Dressings and Topical Wound Products, Infection Control, Edema Control and Offloading. Patient educated on those factors that negatively effect or impact wound healing: smoking, obesity, uncontrolled diabetes, anticoagulant and immunosuppressive regimens, inadequate nutrition, untreated arterial and venous disease if applicable and measures to manage edema. Nutritional status: well nourished. Discussed need for increased protein and calories for wound healing and good sources of protein (just over 7 grams for every 20 pounds of body weight). Animal-based foods high in protein (meat, poultry, fish, eggs, and dairy foods). Plant based foods high in protein (tofu, lentils, beans, chickpeas, nuts, quinoa and den seeds. Off Loading  Offloading or minimizing or removing weight placed on an area with poor circulation such as diabetic wounds or pressure.  This can be achieved with crutches, wheel chair, knee walker etc. Minimizing pressure through partial weight bearing (minimizing the amount of  pressure applied and or the amount of time on the area of pressure) or maintaining a non-weight bearing status can be used to promote and often can be essential for thee wound to heal. Off loading may also need to be achieved for non-weight bearing wounds such as pressure ulcers to the torso. Turning and changing positions frequently, at least every two hours. Use of pressure cushion if sitting up in chair. Skin Care  Keep skin clean and well moisturized , moisturize routinely with ointments for heavier moisturizer needs for extremely dry skin or cracks such as A&D ointment and lotions for a light moisturizer such as CeraVe or Eucerin. If incontinent change incontinence garments as soon as soiled and keeping skin clean and use barrier cream to protect the skin.         PAST MEDICAL HISTORY        Diagnosis Date    Abscess of left foot 4/22/2022    Anemia associated with acute blood loss 4/26/2022    Callus of foot     Right foot - see's Dr. Jessy Wilde    Cellulitis of left foot 4/22/2022    Diabetic ulcer of left midfoot associated with type 2 diabetes mellitus, with muscle involvement without evidence of necrosis ([address] 4/26/2022    Diabetic ulcer of left midfoot associated with type 2 diabetes mellitus, with necrosis of muscle ([address] 6/7/2021    Diabetic ulcer of right midfoot associated with type 2 diabetes mellitus, with fat layer exposed ([address] 8/11/2020    Dizziness     positional    Essential hypertension     Follows with PCP    Hyperlipidemia     Lumbar radiculopathy     Septic embolism ([address] 6/13/2020    Subacute osteomyelitis of left foot ([address] 4/22/2022       PAST SURGICAL HISTORY    Past Surgical History:   Procedure Laterality Date    ACHILLES TENDON SURGERY Right 1/8/2021    RIGHT ACHILLES TENDON LENGTHENING REPAIR performed by Pavan Keenan DPM at [address] [facility]  03/2018    [facility]    DENTAL SURGERY      teeth extractions -half per patient    HERNIA REPAIR Bilateral 5/27/2020    BIALTERAL HERNIA INGUINAL REPAIR performed by Linda Ramirez MD at [address] Right 9/2/2022    LEG AMPUTATION BELOW KNEE performed by Jasson Beltran MD at [address] Right 9/5/2022    LEG AMPUTATION BELOW KNEE REVISION performed by Jasson Beltran MD at [facility] 30 2018    FL OFFICE/OUTPT VISIT,PROCEDURE ONLY Left 4/17/2018    L5-S1 HEMILAMINECTOMY, REMOVAL OF [address] LEFT SIDE performed by Rigoberto Salazar MD at [address] Right 1/8/2021    RIGHT TRANSMETATARSAL TOE AMPUTATION performed by Sudheer Javier DPM at [facility] OR    TOE AMPUTATION Left 4/23/2022    LEFT RAY GREAT TOE AMPUTATION performed by Rayshawn Mosley MD at [address]    Family History   Problem Relation Age of Onset    Heart Disease Father     Diabetes Father     Diabetes Mother     Heart Disease Mother     Other Mother     Kidney Disease Mother     Heart Attack Mother        SOCIAL HISTORY    Social History     Tobacco Use    Smoking status: Never    Smokeless tobacco: Never   Vaping Use    Vaping Use: Never used   Substance Use Topics    Alcohol use: Yes     Comment: occasionally    Drug use: No       ALLERGIES    No Known Allergies    MEDICATIONS    Current Outpatient Medications on File Prior to Encounter   Medication Sig Dispense Refill    prazosin (MINIPRESS) 1 MG capsule Take 1 capsule by mouth nightly 30 capsule 5    QUEtiapine (SEROQUEL) 200 MG tablet Take 1 tablet by mouth 2 times daily 60 tablet 5    carvedilol (COREG) 12.5 MG tablet Take 1 tablet by mouth 2 times daily (with meals) 60 tablet 0    losartan (COZAAR) 100 MG tablet Take 1 tablet by mouth daily 30 tablet 5    clopidogrel (PLAVIX) 75 MG tablet Take 1 tablet by mouth daily 30 tablet 5    atorvastatin (LIPITOR) 40 MG tablet Take 1 tablet by mouth nightly 30 tablet 5    ondansetron (ZOFRAN) 4 MG tablet Take 1 tablet by mouth daily as needed for Nausea or Vomiting 30 tablet 0    dulaglutide (TRULICITY) 1.5 ND/2.4LR SC injection Inject 0.5 mLs into the skin once a week 5 Adjustable Dose Pre-filled Pen Syringe 5    Blood Pressure KIT 1 kit by Does not apply route daily 1 kit 0    pantoprazole (PROTONIX) 40 MG tablet Take 1 tablet by mouth every morning (before breakfast) 30 tablet 5    aspirin 81 MG chewable tablet Take 1 tablet by mouth daily Take 81 mg by mouth daily 30 tablet 2    FLUoxetine (PROZAC) 40 MG capsule Take 2 capsules by mouth daily 30 capsule 0    hydrOXYzine pamoate (VISTARIL) 50 MG capsule Take 50 mg by mouth every 6 hours as needed for Anxiety      docusate sodium (COLACE, DULCOLAX) 100 MG CAPS Take 100 mg by mouth daily      [DISCONTINUED] metFORMIN (GLUCOPHAGE) 500 MG tablet Take 2 tablets by mouth 2 times daily (with meals) Indications: Start tomorrow 03/14 360 tablet 1    [DISCONTINUED] metoclopramide (REGLAN) 10 MG tablet Take 1 tablet by mouth 4 times daily (before meals and nightly) 120 tablet 3    sucralfate (CARAFATE) 1 GM/10ML suspension Take 10 mLs by mouth 4 times daily for 7 days 414 mL 0    Alcohol Swabs PADS       blood glucose monitor strips Test 2 times a day & as needed for symptoms of irregular blood glucose. Dispense sufficient amount for indicated testing frequency plus additional to accommodate PRN testing needs. 100 strip 0    [DISCONTINUED] pioglitazone (ACTOS) 30 MG tablet Take 1 tablet by mouth daily 90 tablet 1    FreeStyle Lancets MISC 1 each by Does not apply route daily 100 each 3    [DISCONTINUED] acetaminophen (TYLENOL) 325 MG tablet Take 2 tablets by mouth every 4 hours as needed for Pain or Fever 120 tablet 3     No current facility-administered medications on file prior to encounter. REVIEW OF SYSTEMS    Pertinent items are noted in HPI. Constitutional: Negative for systemic symptoms including fever, chills and malaise. Objective:      BP (!) 166/85   Pulse 92   Temp 97.6 °F (36.4 °C) (Temporal)   Resp 18     PHYSICAL EXAM      General: The patient is in no acute distress.     Mental status:  Patient is appropriate, is  oriented to place and plan of care. Dermatologic exam: Visual inspection of the periwound reveals the skin to be normal in turgor and texture  Wound exam: see wound description below in procedure note      Assessment:     Problem List Items Addressed This Visit          Endocrine    WD-Diabetic ulcer of left midfoot associated with type 2 diabetes mellitus, with fat layer exposed ([address] - Primary    Relevant Orders    Initiate Outpatient Wound Care Protocol    Neg. Pressure Wound Therapy    Diabetic ulcer of toe of left foot associated with type 2 diabetes mellitus, with fat layer exposed ([address]    Relevant Orders    Initiate Outpatient Wound Care Protocol    Neg. Pressure Wound Therapy       Other    Cellulitis of left foot    Relevant Orders    Initiate Outpatient Wound Care Protocol    Neg. Pressure Wound Therapy    WD-Partial nontraumatic amputation of foot, left ([address]    Relevant Orders    Initiate Outpatient Wound Care Protocol    Neg. Pressure Wound Therapy     Procedure Note    Indications:  Based on my examination of this patient's wound(s) today, sharp excision into necrotic subcutaneous tissue is required to promote healing and evaluate the extent of previous healing. Performed by: RAFAEL Marrufo CNP    Consent obtained: Yes    Time out taken:  Yes    Pain Control: Anesthetic  Anesthetic:       Debridement:Excisional Debridement    Using curette the wound(s) was/were sharply debrided down through and including the removal of subcutaneous      Devitalized Tissue Debrided:  fibrin, biofilm, slough, necrotic/eschar, and exudate    Pre Debridement Measurements:  Are located in the Wound Documentation Flow Sheet    All active wounds listed below with today's date are evaluated  Wound(s)    debrided this date include # : 3    Post  Debridement Measurements:  Negative Pressure Wound Therapy Leg Anterior; Lower;Proximal;Right (Active)   Unit Type KCI 01/10/23 1016   Dressing Type Black Foam 01/10/23 1016 Number of pieces used 4 01/10/23 1016   Number of pieces removed 3 01/10/23 1016   Cycle Continuous 01/10/23 1016   Target Pressure (mmHg) 125 01/10/23 1016   Intensity 5 01/10/23 1016   Canister changed? Yes 01/10/23 1016   Dressing Status New dressing applied 01/10/23 1016   Dressing Changed Changed/New 01/10/23 1016   Drainage Amount Moderate 01/10/23 1016   Drainage Description Sanguinous 01/10/23 1016   Dressing Change Due 12/06/22 12/20/22 0938   Wound Assessment Granulation tissue 01/10/23 1016   Rosalie-wound Assessment Intact;Fragile 01/10/23 1016   Odor None 01/10/23 1016   Number of days: 76       Wound 10/20/22 #3 right amp site. (Active)   Wound Image   01/10/23 0940   Wound Etiology Non-Healing Surgical 01/10/23 1016   Dressing Status New dressing applied;Clean;Dry; Intact 01/10/23 1016   Wound Cleansed Wound cleanser 01/10/23 0940   Offloading for Diabetic Foot Ulcers Other (comment) 01/10/23 1016   Wound Length (cm) 1 cm 01/10/23 0940   Wound Width (cm) 5.8 cm 01/10/23 0940   Wound Depth (cm) 1.2 cm 01/10/23 0940   Wound Surface Area (cm^2) 5.8 cm^2 01/10/23 0940   Change in Wound Size % (l*w) -3.57 01/10/23 0940   Wound Volume (cm^3) 6.96 cm^3 01/10/23 0940   Wound Healing % -211 01/10/23 0940   Post-Procedure Length (cm) 1 cm 01/10/23 0956   Post-Procedure Width (cm) 5.8 cm 01/10/23 0956   Post-Procedure Depth (cm) 1.2 cm 01/10/23 0956   Post-Procedure Surface Area (cm^2) 5.8 cm^2 01/10/23 0956   Post-Procedure Volume (cm^3) 6.96 cm^3 01/10/23 0956   Distance Tunneling (cm) 0 cm 01/10/23 0940   Tunneling Position ___ O'Clock 0 01/10/23 0940   Undermining Starts ___ O'Clock 0 01/10/23 0940   Undermining Ends___ O'Clock 0 01/10/23 0940   Undermining Maxium Distance (cm) 0 01/10/23 0940   Wound Assessment Slough;Pink/red 01/10/23 0940   Drainage Amount Moderate 01/10/23 0940   Drainage Description Serosanguinous; Serous 01/10/23 0940   Odor None 01/10/23 0940   Rosalie-wound Assessment Maceration;Blanchable erythema 01/10/23 0940   Margins Undefined edges 01/10/23 0940   Wound Thickness Description not for Pressure Injury Full thickness 01/10/23 0940   Number of days: 82     Percent of Wound(s) Debrided: approximately 100%    Total  Area  Debrided: 5.8 sq cm     Bleeding:  Minimal    Hemostasis Achieved:  by pressure    Procedural Pain:  0  / 10     Post Procedural Pain:  0 / 10     Response to treatment:  Well tolerated by patient. Status of wound progress and description from last visit: Improving, regimen as below, follow up in one week. The patient continues to orellana depression post his mother's death. Plan:       Discharge Instructions         PHYSICIAN ORDERS AND DISCHARGE INSTRUCTIONS     NOTE: Upon discharge from the [address], you will receive a patient experience survey. We would be grateful if you would take the time to fill this survey out. Wound cleansing:              Do not scrub or use excessive force. Wash hands with soap and water before and after dressing changes. Prior to applying a clean dressing, cleanse wound with normal saline, wound cleanser, or mild soap and water. Ask the physician or nurse before getting the wound(s) wet in a shower     Daily Wound management:              Keep weight off wounds and reposition every 2 hours. Avoid standing for long periods of time. Apply wraps/stockings in AM and remove at bedtime. If swelling is present, elevate legs to the level of the heart or above for 30 minutes 4-5 times a day and/or when sitting. When taking antibiotics take entire prescription as ordered by physician do not stop taking until medicine is all gone.                               Wound Care Notes:  Rx: Robyn Ness  Apply for grafts 05/11/22: aLL GRAFTS approved 06/08/22 for Left Toe amp site  Reapply for graft 08/22/22 for Left toes amp site   YADY's Right 1.07      Left    1.1           Date: 08/15/22   Home Care Discontinued. Needs Nurse visit         Grafts Left Foot Amp Site  Puraply #1 Graft #1 4x4 fenestrated 06/15/22   Puraply #2 Graft #2 4x4 fenestrated 22                                    Orders for this week: 1/10/23     FAX ORDERS TO [facility]! Left Plantar Foot wound -- Clean with soap and water, pat dry. Apply A&D  Wrap with Conform and Coban  Leave in place for 1 week. ((((HOME CARE - PLEASE DO NOT REMOVE THE LEFT LEG DRESSING))))        WOUND VAC THERAPY: Right Leg Amp Site  DUODERM TO PERIWOUND FOR PROTECTION. APPLY SORBACT OVER BONE, STIMULEN and anasept spray TO WOUND BED AND BLACK FOAM TO WOUND BED (be sure to tuck foam into deeper area on lateral aspect of wound). SECURE VAC DRESSING WITH DRAPE. SET WOUND VAC  CONTINUOUS SUCTION. CANISTER CHANGE WEEKLY OR ACCORDING TO VOLUME OF DRAINAGE. Wrap with ACE wrap, but do not compress tubing against skin. Home Care to change vac on  (unless discharging)  WOUND CARE CENTER WILL CHANGE ON TUESDAY (IF VAC SUPPLIES SENT - NEED CANISTER AND BLACK FOAM DRESSING KIT). Nurse visit on  if home care is no longer involved  Follow Up Instructions: At the [address] [address] [facility] [address] in 1 week: Tuesday   Primary Wound Care Provider: Marimar Bautista CNP   Call  for any questions or concerns. Central [phone]        Treatment Note Wound 10/20/22 #3 right amp site. -Dressing/Treatment:  (Anasept, Stimulen, Sorbact, Duoderm, Black Foam, Drape)    Written Patient Dismissal Instructions Given            Electronically signed by RAFAEL Hernandez CNP on 1/10/2023 at 12:21 PM

## 2023-01-10 NOTE — CARE COORDINATION
SW received message from Fadumo LYONS stating Pt needs assistance with setting up transportation to medical appointments. Phone call to Pt  to assist him in setting up transportation to upcoming medical appointments. Pt reported he has an appointment with Dolores Brijesh on 1/11 due to high BP during wound care appointment, Friday he has a wound care center appointment and has to be there at 9:15am.  Pt reported his friend from across the street will take him. After further discussion, SW identified that Pt's neighbor does not have a car and assists in wheeling him to his nearby appointments. SW offered to schedule transportation for 1/17 appointment as [facility] requires a 5 day notice. Pt was in agreement. SW  merged call to [employer] through [facility] to schedule transportation. No answer, voicemail message left requesting they return call to this SW or Pt, contact numbers for both provided. Pt reported he did not make the 1/4 appointment with Dr. Tia Porras. Provided Pt with his contact information to reschedule. Pt did state that he would like to become involved in mental health services stating he has been doing better, but feels attending counseling session may assist in him getting into AL facility if he is involved in [facility] services. Pt reported he has not had a drink of alcohol in several weeks. SW provided Pt with praise for his effort to refrain from alcohol use and to seek mental health services. Home Care stopped coming out. Pt canceled them bc he didn't like the way they were taking care of him, which is why he needs to go back to wound care on 1/11. Will discuss the need for new Home Care referral with Fadumo Jefferson during 1/11 appointment. KOLE plan of care:  SW will contact Pt on 1/11 to assist in setting up transportation to medical appointment scheduled for 1/17.

## 2023-01-10 NOTE — DISCHARGE INSTRUCTIONS
PHYSICIAN ORDERS AND DISCHARGE INSTRUCTIONS     NOTE: Upon discharge from the [address], you will receive a patient experience survey. We would be grateful if you would take the time to fill this survey out. Wound cleansing:              Do not scrub or use excessive force. Wash hands with soap and water before and after dressing changes. Prior to applying a clean dressing, cleanse wound with normal saline, wound cleanser, or mild soap and water. Ask the physician or nurse before getting the wound(s) wet in a shower     Daily Wound management:              Keep weight off wounds and reposition every 2 hours. Avoid standing for long periods of time. Apply wraps/stockings in AM and remove at bedtime. If swelling is present, elevate legs to the level of the heart or above for 30 minutes 4-5 times a day and/or when sitting. When taking antibiotics take entire prescription as ordered by physician do not stop taking until medicine is all gone. Wound Care Notes:  Rx: Nunu Atkins  Apply for grafts 05/11/22: aLL GRAFTS approved 06/08/22 for Left Toe amp site  Reapply for graft 08/22/22 for Left toes amp site   YADY's   Right 1.07      Left    1.1           Date: 08/15/22   Home Care Discontinued. Needs Nurse visit         Grafts Left Foot Amp Site  Puraply #1 Graft #1 4x4 fenestrated 06/15/22   Puraply #2 Graft #2 4x4 fenestrated 06/22/22                                    Orders for this week: 1/10/23     FAX ORDERS TO [facility]! Left Plantar Foot wound -- Clean with soap and water, pat dry. Apply A&D  Wrap with Conform and Coban  Leave in place for 1 week. ((((HOME CARE - PLEASE DO NOT REMOVE THE LEFT LEG DRESSING))))        WOUND VAC THERAPY: Right Leg Amp Site  DUODERM TO PERIWOUND FOR PROTECTION.  APPLY SORBACT OVER BONE, STIMULEN and anasept spray TO WOUND BED AND BLACK FOAM TO WOUND BED (be sure to tuck foam into deeper area on lateral aspect of wound). SECURE VAC DRESSING WITH DRAPE. SET WOUND VAC  CONTINUOUS SUCTION. CANISTER CHANGE WEEKLY OR ACCORDING TO VOLUME OF DRAINAGE. Wrap with ACE wrap, but do not compress tubing against skin. Home Care to change vac on FRIDAYS (unless discharging)  WOUND CARE CENTER WILL CHANGE ON TUESDAY (IF VAC SUPPLIES SENT - NEED CANISTER AND BLACK FOAM DRESSING KIT). Nurse visit on Fridays if home care is no longer involved  Follow Up Instructions: At the [address] in 1 week: Tuesday   Primary Wound Care Provider: Feliz Haile CNP   Call  for any questions or concerns.   Central Scheduling: [phone]

## 2023-01-11 ENCOUNTER — CARE COORDINATION (OUTPATIENT)
Dept: CARE COORDINATION | Age: 43
End: 2023-01-11

## 2023-01-11 ENCOUNTER — NURSE ONLY (OUTPATIENT)
Dept: FAMILY MEDICINE CLINIC | Age: 43
End: 2023-01-11

## 2023-01-11 ENCOUNTER — TELEPHONE (OUTPATIENT)
Dept: FAMILY MEDICINE CLINIC | Age: 43
End: 2023-01-11

## 2023-01-11 VITALS — DIASTOLIC BLOOD PRESSURE: 92 MMHG | SYSTOLIC BLOOD PRESSURE: 160 MMHG

## 2023-01-11 DIAGNOSIS — I10 ESSENTIAL HYPERTENSION: Primary | ICD-10-CM

## 2023-01-11 DIAGNOSIS — E11.40 TYPE 2 DIABETES MELLITUS WITH DIABETIC NEUROPATHY, WITHOUT LONG-TERM CURRENT USE OF INSULIN (HCC): ICD-10-CM

## 2023-01-11 LAB
A/G RATIO: 1 (ref 1.1–2.2)
ALBUMIN SERPL-MCNC: 3.7 G/DL (ref 3.4–5)
ALP BLD-CCNC: 81 U/L (ref 40–129)
ALT SERPL-CCNC: 22 U/L (ref 10–40)
ANION GAP SERPL CALCULATED.3IONS-SCNC: 11 MMOL/L (ref 3–16)
AST SERPL-CCNC: 15 U/L (ref 15–37)
BILIRUB SERPL-MCNC: <0.2 MG/DL (ref 0–1)
BUN BLDV-MCNC: 9 MG/DL (ref 7–20)
CALCIUM SERPL-MCNC: 9.6 MG/DL (ref 8.3–10.6)
CHLORIDE BLD-SCNC: 97 MMOL/L (ref 99–110)
CO2: 26 MMOL/L (ref 21–32)
CREAT SERPL-MCNC: 1 MG/DL (ref 0.9–1.3)
ESTIMATED AVERAGE GLUCOSE: 171.4 MG/DL
GFR SERPL CREATININE-BSD FRML MDRD: >60 ML/MIN/{1.73_M2}
GLUCOSE BLD-MCNC: 284 MG/DL (ref 70–99)
HBA1C MFR BLD: 7.6 %
POTASSIUM SERPL-SCNC: 5.1 MMOL/L (ref 3.5–5.1)
SODIUM BLD-SCNC: 134 MMOL/L (ref 136–145)
TOTAL PROTEIN: 7.3 G/DL (ref 6.4–8.2)

## 2023-01-11 PROCEDURE — 99999 PR OFFICE/OUTPT VISIT,PROCEDURE ONLY: CPT

## 2023-01-11 NOTE — TELEPHONE ENCOUNTER
.lmtrc to pt    Also  for [address]  to [place] call  Re: need psychiatry referral for our pt, do they accept [facility]? Do they do virtual visit  [facility] appts available?

## 2023-01-11 NOTE — TELEPHONE ENCOUNTER
Patient reports in office today for a BP recheck per [facility]. BP in office 160/92. Patient reports he took both losartan 100mg and carvedilol 12.5mg about 1 hour ago. Please advise any changes, patient would like a returned call if anything needs to be changed with his medications.

## 2023-01-11 NOTE — CARE COORDINATION
Phone call to Pt to follow up regarding transportation to medical appointments. Pt declined to call with this SW as he was awaiting a call from his PCP office regarding bloodwork. Discussed with Pt that this SW will attempt to schedule transportation for him. Pt was in agreement. Attempted phone call to [place] to schedule transportation. No answer, voicemail message left requesting a return call to this SW or Pt to schedule a transport to a medical appointment scheduled for 1/17.

## 2023-01-12 ENCOUNTER — CARE COORDINATION (OUTPATIENT)
Dept: CARE COORDINATION | Age: 43
End: 2023-01-12

## 2023-01-12 NOTE — CARE COORDINATION
Attempted phone call to [employer], no answer, voicemail message came on.  SW did not leave message. Phone call to [address] regarding [employer].  stated when she tried [employer] it went straight to voicemail. [facility]  stated there are two numbers for [employer], one is for new clients and that number is [phone] and the number for established clients is [phone]. SW was connected with Sammy Salgado through the [employer] established client line who reported Pt is not an established client and would need to complete paperwork and send it in prior to setting up transportation. SW discussed efforts of attempting to reach that other number for 3 days, only option is to leave a vm message and this SW nor Pt have heard back. Sammy Salgado reported this SW could call back later to speak with her supervisor, Caleb Rehman regarding this matter. SW made another attempt to reach the other number for [employer] and it went straight to voicemail. Phone call to [facility] to determine if they waive the $4 if someone has [facility] involvement. [facility] staff reported they cannot waive the fee. SW made several more attempts to [employer] [facility] and it went straight to vm each time. Attempted phone call to Pt to provide update. No answer, voicemail message left requesting return call, contact number provided. KOLE plan of care:  SW will attempt to reach pt on 1/13 to follow up regarding transportation to medical appointments.

## 2023-01-12 NOTE — TELEPHONE ENCOUNTER
Spoke with pt who agreed to start amlodipine, scheduled nurse visit 1/19/23 1: 20 pm. Pt also agrees to see psych thru [facility] care.     Left 2 nd message to [facility] needing to confirm they take [facility] and they do have psychiatrist

## 2023-01-13 ENCOUNTER — HOSPITAL ENCOUNTER (OUTPATIENT)
Dept: WOUND CARE | Age: 43
Discharge: HOME | End: 2023-01-13
Payer: MEDICARE

## 2023-01-13 ENCOUNTER — CARE COORDINATION (OUTPATIENT)
Dept: CARE COORDINATION | Age: 43
End: 2023-01-13

## 2023-01-13 VITALS
RESPIRATION RATE: 18 BRPM | TEMPERATURE: 96.2 F | DIASTOLIC BLOOD PRESSURE: 77 MMHG | SYSTOLIC BLOOD PRESSURE: 141 MMHG | HEART RATE: 94 BPM

## 2023-01-13 PROCEDURE — 97605 NEG PRS WND THER DME<=50SQCM: CPT

## 2023-01-13 ASSESSMENT — PAIN SCALES - WONG BAKER: WONGBAKER_NUMERICALRESPONSE: 0

## 2023-01-13 ASSESSMENT — PAIN SCALES - GENERAL: PAINLEVEL_OUTOF10: 0

## 2023-01-13 NOTE — PROGRESS NOTES
Negative Pressure Wound Therapy    NAME:  Khoa Campo OF BIRTH:  1980  MEDICAL RECORD NUMBER:  [id number]  DATE:  1/13/2023    Applied Negative Pressure to right amp site wound(s)/ulcer(s). [x] Applied skin barrier prep to serenity-wound. [x] Cut strips of plastic drape to picture frame wound so that serenity-wound is     covered with the drape. [x] If bridging dressing to less prominent site, cover any intact skin that will come in contact with the Negative Pressure Therapy sponge, gauze or channel drain with plastic drape. The sponge should never touch intact skin. [x] Cut sponge, gauze or channel drain to size which will fit into the wound/ulcer bed without being forced. [x] Be sure the sponge is large enough to hold the entire round plastic flange which is attached to the tubing. Never allow flange to be larger than the sponge or it will produce suction damaging intact skin. Total number of individual pieces of foam used within the wound bed: 3    [x] If bridging the dressing away from the primary site, be sure the bridge leads to a piece of sponge large enough to hold the entire flange without allowing any of the flange to overlap onto intact skin. One pc black foam for bridge  [x] Covered sponge, gauze or channel drain with plastic drape. [x] Cut a hole in this plastic drape directly over the sponge the same size as the plastic drain tubing. [x] Removed plastic liner from flange and apply it directly over the hole you cut. [x] Removed the plastic cover from the flange. [x] Attached the tubing to the wound/ulcer Negative Pressure Therapy and turn it on to be sure a vacuum is created and that there are no leaks. [x] If air leaks occur, use plastic drape to patch them. [x] Secured Negative Pressure Therapy dressing with ace wrap loosely if located on an extremity. Maintain tubing outside of ace wrap. Tubing must not exert pressure on intact skin.     Applied per [facility] Guidelines      Electronically signed by Al Coreas LPN on 6/34/8155 at 6:49 AM

## 2023-01-13 NOTE — CARE COORDINATION
Attempted phone call to Pt to follow up regarding transportation to medical appointments. No answer, voicemail message left requesting return call, contact number provided. SW also notified Pt he needs to call [employer] to request a packet prior to arranging transportation, KOLE provided him with that number. KOLE plan of care:  KOLE will follow up with Pt regarding housing and transportation on 1/17.

## 2023-01-17 ENCOUNTER — CARE COORDINATION (OUTPATIENT)
Dept: CARE COORDINATION | Age: 43
End: 2023-01-17

## 2023-01-17 ENCOUNTER — HOSPITAL ENCOUNTER (OUTPATIENT)
Dept: WOUND CARE | Age: 43
Discharge: HOME | End: 2023-01-17
Payer: MEDICARE

## 2023-01-17 VITALS
HEART RATE: 94 BPM | RESPIRATION RATE: 18 BRPM | TEMPERATURE: 97 F | DIASTOLIC BLOOD PRESSURE: 92 MMHG | SYSTOLIC BLOOD PRESSURE: 145 MMHG

## 2023-01-17 DIAGNOSIS — L03.116 CELLULITIS OF LEFT FOOT: ICD-10-CM

## 2023-01-17 DIAGNOSIS — E11.621 DIABETIC ULCER OF LEFT MIDFOOT ASSOCIATED WITH TYPE 2 DIABETES MELLITUS, WITH FAT LAYER EXPOSED (HCC): Primary | ICD-10-CM

## 2023-01-17 DIAGNOSIS — Z89.432 PARTIAL NONTRAUMATIC AMPUTATION OF FOOT, LEFT (HCC): ICD-10-CM

## 2023-01-17 DIAGNOSIS — E11.621 DIABETIC ULCER OF TOE OF LEFT FOOT ASSOCIATED WITH TYPE 2 DIABETES MELLITUS, WITH FAT LAYER EXPOSED (HCC): ICD-10-CM

## 2023-01-17 DIAGNOSIS — L97.422 DIABETIC ULCER OF LEFT MIDFOOT ASSOCIATED WITH TYPE 2 DIABETES MELLITUS, WITH FAT LAYER EXPOSED (HCC): Primary | ICD-10-CM

## 2023-01-17 DIAGNOSIS — L97.522 DIABETIC ULCER OF TOE OF LEFT FOOT ASSOCIATED WITH TYPE 2 DIABETES MELLITUS, WITH FAT LAYER EXPOSED (HCC): ICD-10-CM

## 2023-01-17 PROCEDURE — 97605 NEG PRS WND THER DME<=50SQCM: CPT

## 2023-01-17 PROCEDURE — 11042 DBRDMT SUBQ TIS 1ST 20SQCM/<: CPT | Performed by: NURSE PRACTITIONER

## 2023-01-17 PROCEDURE — 11042 DBRDMT SUBQ TIS 1ST 20SQCM/<: CPT

## 2023-01-17 RX ORDER — BACITRACIN ZINC AND POLYMYXIN B SULFATE 500; 1000 [USP'U]/G; [USP'U]/G
OINTMENT TOPICAL ONCE
OUTPATIENT
Start: 2023-01-17 | End: 2023-01-17

## 2023-01-17 RX ORDER — LIDOCAINE 40 MG/G
CREAM TOPICAL ONCE
OUTPATIENT
Start: 2023-01-17 | End: 2023-01-17

## 2023-01-17 RX ORDER — BACITRACIN, NEOMYCIN, POLYMYXIN B 400; 3.5; 5 [USP'U]/G; MG/G; [USP'U]/G
OINTMENT TOPICAL ONCE
OUTPATIENT
Start: 2023-01-17 | End: 2023-01-17

## 2023-01-17 RX ORDER — LIDOCAINE HYDROCHLORIDE 40 MG/ML
SOLUTION TOPICAL ONCE
OUTPATIENT
Start: 2023-01-17 | End: 2023-01-17

## 2023-01-17 RX ORDER — GENTAMICIN SULFATE 1 MG/G
OINTMENT TOPICAL ONCE
OUTPATIENT
Start: 2023-01-17 | End: 2023-01-17

## 2023-01-17 RX ORDER — BETAMETHASONE DIPROPIONATE 0.05 %
OINTMENT (GRAM) TOPICAL ONCE
OUTPATIENT
Start: 2023-01-17 | End: 2023-01-17

## 2023-01-17 RX ORDER — LIDOCAINE 50 MG/G
OINTMENT TOPICAL ONCE
OUTPATIENT
Start: 2023-01-17 | End: 2023-01-17

## 2023-01-17 RX ORDER — GINSENG 100 MG
CAPSULE ORAL ONCE
OUTPATIENT
Start: 2023-01-17 | End: 2023-01-17

## 2023-01-17 RX ORDER — CLOBETASOL PROPIONATE 0.5 MG/G
OINTMENT TOPICAL ONCE
OUTPATIENT
Start: 2023-01-17 | End: 2023-01-17

## 2023-01-17 RX ORDER — LIDOCAINE HYDROCHLORIDE 20 MG/ML
JELLY TOPICAL ONCE
OUTPATIENT
Start: 2023-01-17 | End: 2023-01-17

## 2023-01-17 ASSESSMENT — PAIN SCALES - GENERAL: PAINLEVEL_OUTOF10: 0

## 2023-01-17 ASSESSMENT — PAIN SCALES - WONG BAKER: WONGBAKER_NUMERICALRESPONSE: 0

## 2023-01-17 NOTE — CARE COORDINATION
SW received voicemail message from [address] [place] [address] transportation stating Pt would need to complete application to receive transportation services. Attempted phone call back to [address] [place] [address] transportation, voicemail message left requesting they send an application to Pt's home address. Received a return call form Ramona Castellanos with [employer] stating Pt only has QI1 through [address] [place] [address], therefore he does not qualify for transportation through [address] [place] [address]. Phone call to Pt. Pt reported his leg is improving, per wound care Dr today, stating there's 2 more areas that need to heal a little more and possibly removing wound vac in 1-2 weeks. Pt has made calls to apartment buildings, left messages with no response. Discussed with Pt that he is not eligible for transportation through [address] [place] [address] due to his insurance. Pt stated understanding. SW provided pt with information for [employer]. Pt reported he would contact them. KOLE plan of care:  KLOE will follow up with pt in one week regarding housing, transportation, mental health services.

## 2023-01-17 NOTE — DISCHARGE INSTRUCTIONS
PHYSICIAN ORDERS AND DISCHARGE INSTRUCTIONS     NOTE: Upon discharge from the Wound Center, you will receive a patient experience survey. We would be grateful if you would take the time to fill this survey out.     Wound cleansing:              Do not scrub or use excessive force.              Wash hands with soap and water before and after dressing changes.              Prior to applying a clean dressing, cleanse wound with normal saline, wound cleanser, or mild soap and water.              Ask the physician or nurse before getting the wound(s) wet in a shower     Daily Wound management:              Keep weight off wounds and reposition every 2 hours.              Avoid standing for long periods of time.              Apply wraps/stockings in AM and remove at bedtime.              If swelling is present, elevate legs to the level of the heart or above for 30 minutes 4-5 times a day and/or when sitting.                                      When taking antibiotics take entire prescription as ordered by physician do not stop taking until medicine is all gone.                              Wound Care Notes:  Rx: [address] [facility]  Apply for grafts 05/11/22: aLL GRAFTS approved 06/08/22 for Left Toe amp site  Reapply for graft 08/22/22 for Left toes amp site   YADY's   Right 1.07      Left    1.1           Date: 08/15/22   Home Care Discontinued. Needs Nurse visit         Grafts Left Foot Amp Site  Puraply #1 Graft #1 4x4 fenestrated 06/15/22   Puraply #2 Graft #2 4x4 fenestrated 06/22/22                                    Orders for this week: 1/17/23     Left Plantar Foot wound -- Clean with soap and water, pat dry.   Apply A&D  Wrap with Conform and Coban  Leave in place for 1 week.  ((((HOME CARE - PLEASE DO NOT REMOVE THE LEFT LEG DRESSING))))        WOUND VAC THERAPY: Right Leg Amp Site  DUODERM TO PERIWOUND FOR PROTECTION. PACK WITH AL then BLACK FOAM TO WOUND BED. SECURE VAC DRESSING WITH DRAPE.     SET WOUND  VAC  CONTINUOUS SUCTION. CANISTER CHANGE WEEKLY OR ACCORDING TO VOLUME OF DRAINAGE. Wrap with ACE wrap, but do not compress tubing against skin. Home Care to change vac on  (unless discharging)  WOUND CARE CENTER WILL CHANGE ON TUESDAY (IF VAC SUPPLIES SENT - NEED CANISTER AND BLACK FOAM DRESSING KIT). Nurse visit on  if home care is no longer involved  Follow Up Instructions: At the [facility] Begin in 1 week: Tuesday   Primary Wound Care Provider: William Appiah CNP   Call  for any questions or concerns.   Central [phone]

## 2023-01-17 NOTE — PROGRESS NOTES
Negative Pressure Wound Therapy    NAME:  Wolfgang Guidry OF BIRTH:  1980  MEDICAL RECORD NUMBER:  [id number]  DATE:  1/17/2023    Applied Negative Pressure to right leg amp site wound(s)/ulcer(s). [x] Applied skin barrier prep to serenity-wound. [x] Cut strips of plastic drape to picture frame wound so that serenity-wound is     covered with the drape. [x] If bridging dressing to less prominent site, cover any intact skin that will come in contact with the Negative Pressure Therapy sponge, gauze or channel drain with plastic drape. The sponge should never touch intact skin. [x] Cut sponge, gauze or channel drain to size which will fit into the wound/ulcer bed without being forced. [x] Be sure the sponge is large enough to hold the entire round plastic flange which is attached to the tubing. Never allow flange to be larger than the sponge or it will produce suction damaging intact skin. Total number of individual pieces of foam used within the wound bed: 1    [x] If bridging the dressing away from the primary site, be sure the bridge leads to a piece of sponge large enough to hold the entire flange without allowing any of the flange to overlap onto intact skin. [x] Covered sponge, gauze or channel drain with plastic drape. [x] Cut a hole in this plastic drape directly over the sponge the same size as the plastic drain tubing. [x] Removed plastic liner from flange and apply it directly over the hole you cut. [x] Removed the plastic cover from the flange. [x] Attached the tubing to the wound/ulcer Negative Pressure Therapy and turn it on to be sure a vacuum is created and that there are no leaks. [x] If air leaks occur, use plastic drape to patch them. [x] Secured Negative Pressure Therapy dressing with ace wrap loosely if located on an extremity. Maintain tubing outside of ace wrap. Tubing must not exert pressure on intact skin.     Applied per  Guidelines      Electronically signed by Oksana Boyle LPN on 2/25/0808 at 5:04 AM

## 2023-01-17 NOTE — PROGRESS NOTES
Wound Care Center Progress Note With Procedure    Katlin Delarosa  AGE: 43 y.o. GENDER: male  : 1980  EPISODE DATE:  2023     Subjective:     Chief Complaint   Patient presents with    Wound Check     Right amp site. HISTORY of PRESENT ILLNESS      Katlin Delarosa is a 43 y.o. male who presents today for wound evaluation of Chronic diabetic, pressure and non-healing surgical ulcer(s) of the left medial foot and lateral plantar surface. The ulcer is of marked severity. The underlying cause of the wound is diabetes, non-healing surgical. He presents today with a new wound to the right plantar foot that is diabetic and pressure in nature and of moderate severity. The patient has significant underlying medical conditions as below. The patient is having significant depression related to the recent and sudden death of his mother. 10/20/22: The patient was here last 10/4/22 and his right BKA site was well approximated, staples removed and steri strips. The prosthetic supplier was with him and plans had started to get fitted. Unfortunately, the patient fell the same night at home and the wound dehisced. He was evaluated at the ED. The patient then was not seen at the wound clinic for a few weeks as he was hospitalized for suicidal ideation. Today able to probe and visibly see bone. Home Health still in place, will apply for a wound vac. Will also start Bactrim. 22: Since his last visit to the wound clinic 22 the patient was hospitalized -22) at [address] [place] [address] with necrotizing fascitis right lower leg with resulting right BKA. 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 Alma Martin. She placed a wound VAC to on the stump at that time. On 2022 Dr. Monica Sumner performed a primary closure of his right BKA stump.     Wound Pain Timing/Severity: none  Quality of pain: N/A  Severity of pain:  0 / 10   Modifying Factors: diabetes and chronic pressure  Associated Signs/Symptoms: drainage     Diabetes: Yes, on an oral and insulin regimen, last A1c 10.5 as of 7/28/22  Diabetes education provided today:    Diabetes pathoetiology, difference between type 1 and type 2 diabetes, and progressive nature of Type 2 DM. Diabetic Neuropathy: signs and therapy. Foot care: advised to wash feet daily, pat dry and apply lotion at night, avoiding between toes. Need to look at feet daily and report to a physician any signs of inflammation or skin damage. Discussed diabetes shoes and socks. Diabetic management related to wound care    Smoking: Never smoker  Obesity:No  Anticoagulant therapy: No  Immunosuppression: No    Patient educated on the 6 essential components necessary for wound healing: Circulation, Debridements, Proper Dressings and Topical Wound Products, Infection Control, Edema Control and Offloading. Patient educated on those factors that negatively effect or impact wound healing: smoking, obesity, uncontrolled diabetes, anticoagulant and immunosuppressive regimens, inadequate nutrition, untreated arterial and venous disease if applicable and measures to manage edema. Nutritional status: well nourished. Discussed need for increased protein and calories for wound healing and good sources of protein (just over 7 grams for every 20 pounds of body weight). Animal-based foods high in protein (meat, poultry, fish, eggs, and dairy foods). Plant based foods high in protein (tofu, lentils, beans, chickpeas, nuts, quinoa and den seeds. Off Loading  Offloading or minimizing or removing weight placed on an area with poor circulation such as diabetic wounds or pressure.  This can be achieved with crutches, wheel chair, knee walker etc. Minimizing pressure through partial weight bearing (minimizing the amount of  pressure applied and or the amount of time on the area of pressure) or maintaining a non-weight bearing status can be used to promote and often can be essential for thee wound to heal. Off loading may also need to be achieved for non-weight bearing wounds such as pressure ulcers to the torso. Turning and changing positions frequently, at least every two hours. Use of pressure cushion if sitting up in chair. Skin Care  Keep skin clean and well moisturized , moisturize routinely with ointments for heavier moisturizer needs for extremely dry skin or cracks such as A&D ointment and lotions for a light moisturizer such as CeraVe or Eucerin. If incontinent change incontinence garments as soon as soiled and keeping skin clean and use barrier cream to protect the skin.         PAST MEDICAL HISTORY        Diagnosis Date    Abscess of left foot 4/22/2022    Anemia associated with acute blood loss 4/26/2022    Callus of foot     Right foot - see's Dr. Karen Sánchez    Cellulitis of left foot 4/22/2022    Diabetic ulcer of left midfoot associated with type 2 diabetes mellitus, with muscle involvement without evidence of necrosis ([address] 4/26/2022    Diabetic ulcer of left midfoot associated with type 2 diabetes mellitus, with necrosis of muscle ([address] 6/7/2021    Diabetic ulcer of right midfoot associated with type 2 diabetes mellitus, with fat layer exposed ([address] 8/11/2020    Dizziness     positional    Essential hypertension     Follows with PCP    Hyperlipidemia     Lumbar radiculopathy     Septic embolism ([address] 6/13/2020    Subacute osteomyelitis of left foot ([address] 4/22/2022       PAST SURGICAL HISTORY    Past Surgical History:   Procedure Laterality Date    ACHILLES TENDON SURGERY Right 1/8/2021    RIGHT ACHILLES TENDON LENGTHENING REPAIR performed by Santiago Yi DPM at [address] [facility]  03/2018    [facility]    DENTAL SURGERY      teeth extractions -half per patient    HERNIA REPAIR Bilateral 5/27/2020    BIALTERAL HERNIA INGUINAL REPAIR performed by Bony Bolanos MD at [address] Right 9/2/2022    LEG AMPUTATION BELOW KNEE performed by Siomara Canales MD at [address] Right 9/5/2022    LEG AMPUTATION BELOW KNEE REVISION performed by Siomara Canales MD at [facility] 2018    MI OFFICE/OUTPT VISIT,PROCEDURE ONLY Left 4/17/2018    L5-S1 HEMILAMINECTOMY, REMOVAL OF [address] LEFT SIDE performed by Willy Wright MD at [address] Right 1/8/2021    RIGHT TRANSMETATARSAL TOE AMPUTATION performed by Luisana Almendarez DPM at [address] [facility]    TOE AMPUTATION Left 4/23/2022    LEFT RAY GREAT TOE AMPUTATION performed by Ysabel Farris MD at [address]    Family History   Problem Relation Age of Onset    Heart Disease Father     Diabetes Father     Diabetes Mother     Heart Disease Mother     Other Mother     Kidney Disease Mother     Heart Attack Mother        SOCIAL HISTORY    Social History     Tobacco Use    Smoking status: Never    Smokeless tobacco: Never   Vaping Use    Vaping Use: Never used   Substance Use Topics    Alcohol use: Yes     Comment: occasionally    Drug use: No       ALLERGIES    No Known Allergies    MEDICATIONS    Current Outpatient Medications on File Prior to Encounter   Medication Sig Dispense Refill    prazosin (MINIPRESS) 1 MG capsule Take 1 capsule by mouth nightly 30 capsule 5    QUEtiapine (SEROQUEL) 200 MG tablet Take 1 tablet by mouth 2 times daily 60 tablet 5    carvedilol (COREG) 12.5 MG tablet Take 1 tablet by mouth 2 times daily (with meals) 60 tablet 0    losartan (COZAAR) 100 MG tablet Take 1 tablet by mouth daily 30 tablet 5    clopidogrel (PLAVIX) 75 MG tablet Take 1 tablet by mouth daily 30 tablet 5    atorvastatin (LIPITOR) 40 MG tablet Take 1 tablet by mouth nightly 30 tablet 5    ondansetron (ZOFRAN) 4 MG tablet Take 1 tablet by mouth daily as needed for Nausea or Vomiting 30 tablet 0    dulaglutide (TRULICITY) 1.5 NB/4.4HV SC injection Inject 0.5 mLs into the skin once a week 5 Adjustable Dose Pre-filled Pen Syringe 5    Blood Pressure KIT 1 kit by Does not apply route daily 1 kit 0    pantoprazole (PROTONIX) 40 MG tablet Take 1 tablet by mouth every morning (before breakfast) 30 tablet 5    aspirin 81 MG chewable tablet Take 1 tablet by mouth daily Take 81 mg by mouth daily 30 tablet 2    FLUoxetine (PROZAC) 40 MG capsule Take 2 capsules by mouth daily 30 capsule 0    hydrOXYzine pamoate (VISTARIL) 50 MG capsule Take 50 mg by mouth every 6 hours as needed for Anxiety      docusate sodium (COLACE, DULCOLAX) 100 MG CAPS Take 100 mg by mouth daily      [DISCONTINUED] metFORMIN (GLUCOPHAGE) 500 MG tablet Take 2 tablets by mouth 2 times daily (with meals) Indications: Start tomorrow 03/14 360 tablet 1    [DISCONTINUED] metoclopramide (REGLAN) 10 MG tablet Take 1 tablet by mouth 4 times daily (before meals and nightly) 120 tablet 3    sucralfate (CARAFATE) 1 GM/10ML suspension Take 10 mLs by mouth 4 times daily for 7 days 414 mL 0    Alcohol Swabs PADS       blood glucose monitor strips Test 2 times a day & as needed for symptoms of irregular blood glucose. Dispense sufficient amount for indicated testing frequency plus additional to accommodate PRN testing needs. 100 strip 0    [DISCONTINUED] pioglitazone (ACTOS) 30 MG tablet Take 1 tablet by mouth daily 90 tablet 1    FreeStyle Lancets MISC 1 each by Does not apply route daily 100 each 3    [DISCONTINUED] acetaminophen (TYLENOL) 325 MG tablet Take 2 tablets by mouth every 4 hours as needed for Pain or Fever 120 tablet 3     No current facility-administered medications on file prior to encounter. REVIEW OF SYSTEMS    Pertinent items are noted in HPI. Constitutional: Negative for systemic symptoms including fever, chills and malaise. Objective:      BP (!) 145/92   Pulse 94   Temp 97 °F (36.1 °C) (Temporal)   Resp 18     PHYSICAL EXAM      General: The patient is in no acute distress.     Mental status:  Patient is appropriate, is  oriented to place and plan of care. Dermatologic exam: Visual inspection of the periwound reveals the skin to be normal in turgor and texture  Wound exam: see wound description below in procedure note      Assessment:     Problem List Items Addressed This Visit          Endocrine    WD-Diabetic ulcer of left midfoot associated with type 2 diabetes mellitus, with fat layer exposed ([address] - Primary    Relevant Orders    Initiate Outpatient Wound Care Protocol    Neg. Pressure Wound Therapy    Diabetic ulcer of toe of left foot associated with type 2 diabetes mellitus, with fat layer exposed ([address]    Relevant Orders    Initiate Outpatient Wound Care Protocol    Neg. Pressure Wound Therapy       Other    Cellulitis of left foot    Relevant Orders    Initiate Outpatient Wound Care Protocol    Neg. Pressure Wound Therapy    WD-Partial nontraumatic amputation of foot, left ([address]    Relevant Orders    Initiate Outpatient Wound Care Protocol    Neg. Pressure Wound Therapy     Procedure Note    Indications:  Based on my examination of this patient's wound(s) today, sharp excision into necrotic subcutaneous tissue is required to promote healing and evaluate the extent of previous healing. Performed by: RAFAEL Conner - CNP    Consent obtained: Yes    Time out taken:  Yes    Pain Control: Anesthetic  Anesthetic:       Debridement:Excisional Debridement    Using curette the wound(s) was/were sharply debrided down through and including the removal of subcutaneous      Devitalized Tissue Debrided:  fibrin, biofilm, slough, necrotic/eschar, and exudate    Pre Debridement Measurements:  Are located in the Wound Documentation Flow Sheet    All active wounds listed below with today's date are evaluated  Wound(s)    debrided this date include # : 3    Post  Debridement Measurements:  Negative Pressure Wound Therapy Leg Anterior; Lower;Proximal;Right (Active)   Unit Type KCI 01/13/23 0926   Dressing Type Black Foam 01/13/23 0926   Number of pieces used 4 01/13/23 0926   Number of pieces removed 4 01/13/23 0901   Cycle Continuous 01/13/23 0926   Target Pressure (mmHg) 125 01/13/23 0926   Intensity 5 01/10/23 1016   Canister changed? No 01/13/23 0926   Dressing Status New dressing applied 01/13/23 0926   Dressing Changed Changed/New 01/13/23 0926   Drainage Amount Moderate 01/13/23 0926   Drainage Description Sanguinous 01/13/23 0926   Dressing Change Due 12/06/22 12/20/22 0938   Wound Assessment Granulation tissue 01/10/23 1016   Rosalie-wound Assessment Intact;Fragile 01/10/23 1016   Odor None 01/10/23 1016   Number of days: 83       Wound 10/20/22 #3 right amp site. (Active)   Wound Image   01/10/23 0940   Wound Etiology Non-Healing Surgical 01/17/23 0851   Dressing Status New dressing applied;Clean;Dry; Intact 01/13/23 0926   Wound Cleansed Soap and water 01/17/23 0851   Offloading for Diabetic Foot Ulcers Other (comment) 01/17/23 0851   Wound Length (cm) 1 cm 01/17/23 0851   Wound Width (cm) 5.5 cm 01/17/23 0851   Wound Depth (cm) 0.5 cm 01/17/23 0851   Wound Surface Area (cm^2) 5.5 cm^2 01/17/23 0851   Change in Wound Size % (l*w) 1.79 01/17/23 0851   Wound Volume (cm^3) 2.75 cm^3 01/17/23 0851   Wound Healing % -23 01/17/23 0851   Post-Procedure Length (cm) 1 cm 01/17/23 0915   Post-Procedure Width (cm) 5.5 cm 01/17/23 0915   Post-Procedure Depth (cm) 0.5 cm 01/17/23 0915   Post-Procedure Surface Area (cm^2) 5.5 cm^2 01/17/23 0915   Post-Procedure Volume (cm^3) 2.75 cm^3 01/17/23 0915   Distance Tunneling (cm) 0 cm 01/17/23 0851   Tunneling Position ___ O'Clock 0 01/17/23 0851   Undermining Starts ___ O'Clock 0 01/17/23 0851   Undermining Ends___ O'Clock 0 01/17/23 0851   Undermining Maxium Distance (cm) 0 01/17/23 0851   Wound Assessment Slough;[place]/red 01/17/23 0851   Drainage Amount Moderate 01/17/23 0851   Drainage Description Sanguinous; Serosanguinous 01/17/23 0851   Odor None 01/17/23 0851   Rosalie-wound Assessment Maceration; Excoriated 01/17/23 0851   Margins Undefined edges 01/17/23 0851   Wound Thickness Description not for Pressure Injury Full thickness 01/17/23 0851   Number of days: 88     Percent of Wound(s) Debrided: approximately 100%    Total  Area  Debrided: 5.5 sq cm     Bleeding:  Minimal    Hemostasis Achieved:  by pressure    Procedural Pain:  0  / 10     Post Procedural Pain:  0 / 10     Response to treatment:  Well tolerated by patient. Status of wound progress and description from last visit: Improving, regimen as below, follow up in one week. The patient continues to orellana depression post his mother's death. Plan:       Discharge Instructions         PHYSICIAN ORDERS AND DISCHARGE INSTRUCTIONS     NOTE: Upon discharge from the [address], you will receive a patient experience survey. We would be grateful if you would take the time to fill this survey out. Wound cleansing:              Do not scrub or use excessive force. Wash hands with soap and water before and after dressing changes. Prior to applying a clean dressing, cleanse wound with normal saline, wound cleanser, or mild soap and water. Ask the physician or nurse before getting the wound(s) wet in a shower     Daily Wound management:              Keep weight off wounds and reposition every 2 hours. Avoid standing for long periods of time. Apply wraps/stockings in AM and remove at bedtime. If swelling is present, elevate legs to the level of the heart or above for 30 minutes 4-5 times a day and/or when sitting. When taking antibiotics take entire prescription as ordered by physician do not stop taking until medicine is all gone.                               Wound Care Notes:  Rx: Ted Quinteros  Apply for grafts 05/11/22: aLL GRAFTS approved 06/08/22 for Left Toe amp site  Reapply for graft 08/22/22 for Left toes amp site   YADY's   Right 1.07 Left    1.1           Date: 08/15/22   Home Care Discontinued. Needs Nurse visit         Grafts Left Foot Amp Site  Puraply #1 Graft #1 4x4 fenestrated 06/15/22   Puraply #2 Graft #2 4x4 fenestrated 22                                    Orders for this week: 23     FAX ORDERS TO [facility]! Left Plantar Foot wound -- Clean with soap and water, pat dry. Apply A&D  Wrap with Conform and Coban  Leave in place for 1 week. ((((HOME CARE - PLEASE DO NOT REMOVE THE LEFT LEG DRESSING))))        WOUND VAC THERAPY: Right Leg Amp Site  DUODERM TO PERIWOUND FOR PROTECTION. PACK WITH AL then BLACK FOAM TO WOUND BED. SECURE VAC DRESSING WITH DRAPE. SET WOUND VAC  CONTINUOUS SUCTION. CANISTER CHANGE WEEKLY OR ACCORDING TO VOLUME OF DRAINAGE. Wrap with ACE wrap, but do not compress tubing against skin. Home Care to change vac on  (unless discharging)  WOUND CARE CENTER WILL CHANGE ON TUESDAY (IF VAC SUPPLIES SENT - NEED CANISTER AND BLACK FOAM DRESSING KIT). Nurse visit on  if home care is no longer involved  Follow Up Instructions: At the [address] in 1 week: Tuesday   Primary Wound Care Provider: Christiana Muñoz CNP   Call  for any questions or concerns.   Central [phone]        Treatment Note      Written Patient Dismissal Instructions Given            Electronically signed by RAFAEL Espana CNP on 2023 at 9:23 AM

## 2023-01-18 ENCOUNTER — CARE COORDINATION (OUTPATIENT)
Dept: CARE COORDINATION | Age: 43
End: 2023-01-18

## 2023-01-18 NOTE — CARE COORDINATION
Ambulatory Care Coordination Note      ACC: Jenniffer Martinez, MEGAN    Call to pt for acm f/u. Denies symptoms reiewed upcoming apptmts and reviewed possibility of transpo with SCAT dial a ride. Pt has number and encouraged to call. Plan will continue to follow pt to eval if scat is viable transpo option and educate/provide resources as needed. Offered patient enrollment in the Remote Patient Monitoring (RPM) program for in-home monitoring: Patient is not eligible for RPM program.    Lab Results       None            Care Coordination Interventions    Referral from Primary Care Provider: No  Suggested Interventions and Community Resources  Fall Risk Prevention: In Process  Home Care Waiver: In Process  Home Health Services: Completed  Registered Dietician: In Process  Social Work: Completed  Zone Management Tools: Completed          Goals Addressed                   This Visit's Progress     Conditions and Symptoms   On track     I will schedule office visits, as directed by my provider. I will keep my appointment or reschedule if I have to cancel. I will notify my provider of any barriers to my plan of care. I will follow my Zone Management tool to seek urgent or emergent care. I will notify my provider of any symptoms that indicate a worsening of my condition. Barriers: overwhelmed by complexity of regimen  Plan for overcoming my barriers: Patient will utilize zone management tool to support healthy BS range. Confidence:  6/10  Anticipated Goal Completion Date:  10/28/22    7/28/22:  Requested referral for [facility] RN to support diabetic management. Prior to Admission medications    Medication Sig Start Date End Date Taking?  Authorizing Provider   sulfamethoxazole-trimethoprim (BACTRIM DS) 800-160 MG per tablet Take 1 tablet by mouth 2 times daily for 7 days 1/23/23 1/30/23  Cassandra Wahl PA-C   cephALEXin [facility]) 500 MG capsule Take 1 capsule by mouth 3 times daily 1/20/23   Lopez Dumont Lance Zambrano PA-C   prazosin (MINIPRESS) 1 MG capsule Take 1 capsule by mouth nightly 1/10/23   Yousif Walker, APRN - CNP   QUEtiapine (SEROQUEL) 200 MG tablet Take 1 tablet by mouth 2 times daily 1/10/23   Yousif Walker, APRN - CNP   carvedilol (COREG) 12.5 MG tablet Take 1 tablet by mouth 2 times daily (with meals) 1/10/23   Yousif Walker, APRN - CNP   losartan (COZAAR) 100 MG tablet Take 1 tablet by mouth daily 1/10/23   Ryanella Sievert, APRN - CNP   clopidogrel (PLAVIX) 75 MG tablet Take 1 tablet by mouth daily 1/10/23   Ryanella Sievert, APRN - CNP   atorvastatin (LIPITOR) 40 MG tablet Take 1 tablet by mouth nightly 1/10/23 2/9/23  Yousif Walker, APRN - CNP   ondansetron (ZOFRAN) 4 MG tablet Take 1 tablet by mouth daily as needed for Nausea or Vomiting 1/10/23   Yousif Walker, APRN - CNP   dulaglutide (TRULICITY) 1.5 DR/2.0OL SC injection Inject 0.5 mLs into the skin once a week 1/10/23   Yousif Walker, APRN - CNP   Blood Pressure KIT 1 kit by Does not apply route daily 1/10/23   Ryanella Sievert, APRN - CNP   pantoprazole (PROTONIX) 40 MG tablet Take 1 tablet by mouth every morning (before breakfast) 1/10/23   Yousif Walker, APRN - CNP   aspirin 81 MG chewable tablet Take 1 tablet by mouth daily Take 81 mg by mouth daily 12/9/22   PERCY Burks   FLUoxetine (PROZAC) 40 MG capsule Take 2 capsules by mouth daily 11/17/22 1/20/23  Ryanella Aaron, APRN - CNP   hydrOXYzine pamoate (VISTARIL) 50 MG capsule Take 50 mg by mouth every 6 hours as needed for Anxiety    Dasia Bent   docusate sodium (COLACE, DULCOLAX) 100 MG CAPS Take 100 mg by mouth daily 9/17/22   Vipul Funk MD   metFORMIN (GLUCOPHAGE) 500 MG tablet Take 2 tablets by mouth 2 times daily (with meals) Indications: Start tomorrow 03/14 9/2/22 9/16/22  Julio Alaniz MD   metoclopramide (REGLAN) 10 MG tablet Take 1 tablet by mouth 4 times daily (before meals and nightly) 8/29/22 9/2/22  RAFAEL Acosta - CNP   Alcohol Swabs PADS  4/27/22   Historical Provider, MD   blood glucose monitor strips Test 2 times a day & as needed for symptoms of irregular blood glucose. Dispense sufficient amount for indicated testing frequency plus additional to accommodate PRN testing needs. 4/27/22   Candice Weston MD   pioglitazone (ACTOS) 30 MG tablet Take 1 tablet by mouth daily 4/27/22 9/16/22  Candice Weston MD   FreeStyle Lancets MISC 1 each by Does not apply route daily 6/15/20   Nelson Viera MD   acetaminophen (TYLENOL) 325 MG tablet Take 2 tablets by mouth every 4 hours as needed for Pain or Fever 2/28/18 9/2/22  Manda Payne MD       Future Appointments   Date Time Provider Rosario Nesbitt   1/27/2023  9:15 AM SCHEDULE, [address] [facility]   1/31/2023  9:15 AM Rossie Bumpers, APRN - CNP [facility]   2/7/2023  9:30 AM RAFAEL Navarro CNP [facility]   ,   Diabetes Assessment    Medic Alert ID: No  Meal Planning: Carb counting, Calorie counting   How often do you test your blood sugar?: Meals   Do you have barriers with adherence to non-pharmacologic self-management interventions?  (Nutrition/Exercise/Self-Monitoring): Yes   Have you ever had to go to the ED for symptoms of low blood sugar?: No       No patient-reported symptoms        , and   General Assessment    Do you have any symptoms that are causing concern?: No

## 2023-01-19 ENCOUNTER — NURSE ONLY (OUTPATIENT)
Dept: FAMILY MEDICINE CLINIC | Age: 43
End: 2023-01-19

## 2023-01-19 ENCOUNTER — TELEPHONE (OUTPATIENT)
Dept: FAMILY MEDICINE CLINIC | Age: 43
End: 2023-01-19

## 2023-01-19 VITALS — SYSTOLIC BLOOD PRESSURE: 132 MMHG | HEART RATE: 98 BPM | DIASTOLIC BLOOD PRESSURE: 82 MMHG | OXYGEN SATURATION: 96 %

## 2023-01-19 PROCEDURE — 2000F BLOOD PRESSURE MEASURE: CPT

## 2023-01-20 ENCOUNTER — OFFICE VISIT (OUTPATIENT)
Dept: FAMILY MEDICINE CLINIC | Age: 43
End: 2023-01-20
Payer: MEDICARE

## 2023-01-20 ENCOUNTER — HOSPITAL ENCOUNTER (OUTPATIENT)
Dept: WOUND CARE | Age: 43
Discharge: HOME | End: 2023-01-20
Payer: MEDICARE

## 2023-01-20 VITALS
TEMPERATURE: 97.6 F | SYSTOLIC BLOOD PRESSURE: 128 MMHG | RESPIRATION RATE: 18 BRPM | DIASTOLIC BLOOD PRESSURE: 75 MMHG | HEART RATE: 82 BPM

## 2023-01-20 VITALS
BODY MASS INDEX: 21.43 KG/M2 | HEART RATE: 88 BPM | DIASTOLIC BLOOD PRESSURE: 84 MMHG | OXYGEN SATURATION: 99 % | HEIGHT: 72 IN | SYSTOLIC BLOOD PRESSURE: 134 MMHG

## 2023-01-20 DIAGNOSIS — L02.11 ABSCESS OF SKIN OF NECK: Primary | ICD-10-CM

## 2023-01-20 PROCEDURE — 97605 NEG PRS WND THER DME<=50SQCM: CPT

## 2023-01-20 PROCEDURE — 3078F DIAST BP <80 MM HG: CPT | Performed by: PHYSICIAN ASSISTANT

## 2023-01-20 PROCEDURE — 1036F TOBACCO NON-USER: CPT | Performed by: PHYSICIAN ASSISTANT

## 2023-01-20 PROCEDURE — G8420 CALC BMI NORM PARAMETERS: HCPCS | Performed by: PHYSICIAN ASSISTANT

## 2023-01-20 PROCEDURE — 99213 OFFICE O/P EST LOW 20 MIN: CPT | Performed by: PHYSICIAN ASSISTANT

## 2023-01-20 PROCEDURE — G8484 FLU IMMUNIZE NO ADMIN: HCPCS | Performed by: PHYSICIAN ASSISTANT

## 2023-01-20 PROCEDURE — 97606 NEG PRS WND THER DME>50 SQCM: CPT

## 2023-01-20 PROCEDURE — G8427 DOCREV CUR MEDS BY ELIG CLIN: HCPCS | Performed by: PHYSICIAN ASSISTANT

## 2023-01-20 PROCEDURE — 3074F SYST BP LT 130 MM HG: CPT | Performed by: PHYSICIAN ASSISTANT

## 2023-01-20 RX ORDER — CEPHALEXIN 500 MG/1
500 CAPSULE ORAL 3 TIMES DAILY
Qty: 30 CAPSULE | Refills: 0 | Status: SHIPPED | OUTPATIENT
Start: 2023-01-20

## 2023-01-20 RX ORDER — SULFAMETHOXAZOLE AND TRIMETHOPRIM 800; 160 MG/1; MG/1
1 TABLET ORAL 2 TIMES DAILY
Qty: 20 TABLET | Refills: 0 | Status: SHIPPED | OUTPATIENT
Start: 2023-01-20 | End: 2023-01-30

## 2023-01-20 ASSESSMENT — PATIENT HEALTH QUESTIONNAIRE - PHQ9
SUM OF ALL RESPONSES TO PHQ QUESTIONS 1-9: 0
SUM OF ALL RESPONSES TO PHQ QUESTIONS 1-9: 0
1. LITTLE INTEREST OR PLEASURE IN DOING THINGS: 0
10. IF YOU CHECKED OFF ANY PROBLEMS, HOW DIFFICULT HAVE THESE PROBLEMS MADE IT FOR YOU TO DO YOUR WORK, TAKE CARE OF THINGS AT HOME, OR GET ALONG WITH OTHER PEOPLE: 0
2. FEELING DOWN, DEPRESSED OR HOPELESS: 0
SUM OF ALL RESPONSES TO PHQ QUESTIONS 1-9: 0
SUM OF ALL RESPONSES TO PHQ QUESTIONS 1-9: 0
7. TROUBLE CONCENTRATING ON THINGS, SUCH AS READING THE NEWSPAPER OR WATCHING TELEVISION: 0
SUM OF ALL RESPONSES TO PHQ9 QUESTIONS 1 & 2: 0
3. TROUBLE FALLING OR STAYING ASLEEP: 0
5. POOR APPETITE OR OVEREATING: 0
4. FEELING TIRED OR HAVING LITTLE ENERGY: 0
8. MOVING OR SPEAKING SO SLOWLY THAT OTHER PEOPLE COULD HAVE NOTICED. OR THE OPPOSITE, BEING SO FIGETY OR RESTLESS THAT YOU HAVE BEEN MOVING AROUND A LOT MORE THAN USUAL: 0
9. THOUGHTS THAT YOU WOULD BE BETTER OFF DEAD, OR OF HURTING YOURSELF: 0
6. FEELING BAD ABOUT YOURSELF - OR THAT YOU ARE A FAILURE OR HAVE LET YOURSELF OR YOUR FAMILY DOWN: 0

## 2023-01-20 ASSESSMENT — PAIN DESCRIPTION - ORIENTATION: ORIENTATION: RIGHT

## 2023-01-20 ASSESSMENT — PAIN SCALES - WONG BAKER: WONGBAKER_NUMERICALRESPONSE: 0

## 2023-01-20 ASSESSMENT — PAIN DESCRIPTION - FREQUENCY: FREQUENCY: INTERMITTENT

## 2023-01-20 ASSESSMENT — PAIN - FUNCTIONAL ASSESSMENT: PAIN_FUNCTIONAL_ASSESSMENT: PREVENTS OR INTERFERES SOME ACTIVE ACTIVITIES AND ADLS

## 2023-01-20 ASSESSMENT — PAIN DESCRIPTION - LOCATION: LOCATION: LEG

## 2023-01-20 ASSESSMENT — PAIN SCALES - GENERAL: PAINLEVEL_OUTOF10: 2

## 2023-01-20 ASSESSMENT — PAIN DESCRIPTION - ONSET: ONSET: ON-GOING

## 2023-01-20 ASSESSMENT — PAIN DESCRIPTION - DESCRIPTORS: DESCRIPTORS: THROBBING

## 2023-01-20 NOTE — PROGRESS NOTES
Negative Pressure Wound Therapy    NAME:  Yazan Fan OF BIRTH:  1980  MEDICAL RECORD NUMBER:  [id number]  DATE:  1/20/2023    Applied Negative Pressure to right amp stump wound(s)/ulcer(s). [x] Applied skin barrier prep to serenity-wound. [x] Cut strips of plastic drape to picture frame wound so that serenity-wound is     covered with the drape. [x] If bridging dressing to less prominent site, cover any intact skin that will come in contact with the Negative Pressure Therapy sponge, gauze or channel drain with plastic drape. The sponge should never touch intact skin. [x] Cut sponge, gauze or channel drain to size which will fit into the wound/ulcer bed without being forced. [x] Be sure the sponge is large enough to hold the entire round plastic flange which is attached to the tubing. Never allow flange to be larger than the sponge or it will produce suction damaging intact skin. Total number of individual pieces of foam used within the wound bed: 1    [x] If bridging the dressing away from the primary site, be sure the bridge leads to a piece of sponge large enough to hold the entire flange without allowing any of the flange to overlap onto intact skin. [x] Covered sponge, gauze or channel drain with plastic drape. [x] Cut a hole in this plastic drape directly over the sponge the same size as the plastic drain tubing. [x] Removed plastic liner from flange and apply it directly over the hole you cut. [x] Removed the plastic cover from the flange. [x] Attached the tubing to the wound/ulcer Negative Pressure Therapy and turn it on to be sure a vacuum is created and that there are no leaks. [x] If air leaks occur, use plastic drape to patch them. [x] Secured Negative Pressure Therapy dressing with ace wrap loosely if located on an extremity. Maintain tubing outside of ace wrap. Tubing must not exert pressure on intact skin.     Applied per  Guidelines      Electronically signed by Fabi Silverman LPN on 7/85/8173 at 9:02 AM

## 2023-01-20 NOTE — PROGRESS NOTES
1/20/2023    [facility]    Chief Complaint   Patient presents with    Insect Bite     Back of lower neck, patient found yesterday and stated spot is getting worse, swollen, redness. Patient concerned about possible spider bite       HPI  History was obtained from patient. Niraj Stratton is a 43 y.o. male who presents today with concerns for an infection or spider bite on the back of his neck. He states that he noticed it yesterday. There is redness, swelling, and mild discomfort. He believes that the \"spot\" is getting larger. Denies any drainage from the area. He denies nausea, vomiting, fever or chills. He states he is otherwise in his normal baseline state of health. 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 He states the area is healing well. He actually saw wound care today. He is not currently on any antibiotics. He denies any history of known MRSA.       PAST MEDICAL HISTORY  Past Medical History:   Diagnosis Date    Abscess of left foot 4/22/2022    Anemia associated with acute blood loss 4/26/2022    Callus of foot     Right foot - see's Dr. Jeremy Yun    Cellulitis of left foot 4/22/2022    Diabetic ulcer of left midfoot associated with type 2 diabetes mellitus, with muscle involvement without evidence of necrosis ([address] 4/26/2022    Diabetic ulcer of left midfoot associated with type 2 diabetes mellitus, with necrosis of muscle ([address] 6/7/2021    Diabetic ulcer of right midfoot associated with type 2 diabetes mellitus, with fat layer exposed ([address] 8/11/2020    Dizziness     positional    Essential hypertension     Follows with PCP    Hyperlipidemia     Lumbar radiculopathy     Septic embolism ([address] 6/13/2020    Subacute osteomyelitis of left foot ([address] 4/22/2022       FAMILY HISTORY  Family History   Problem Relation Age of Onset    Heart Disease Father     Diabetes Father     Diabetes Mother     Heart Disease Mother     Other Mother     Kidney Disease Mother     Heart Attack Mother        SOCIAL HISTORY  Social History     Socioeconomic History    Marital status: Single     Spouse name: None    Number of children: None    Years of education: None    Highest education level: None   Tobacco Use    Smoking status: Never    Smokeless tobacco: Never   Vaping Use    Vaping Use: Never used   Substance and Sexual Activity    Alcohol use: Yes     Comment: occasionally    Drug use: No    Sexual activity: Yes     Partners: Female     Social Determinants of Health     Financial Resource Strain: Medium Risk    Difficulty of Paying Living Expenses: Somewhat hard   Food Insecurity: Food Insecurity Present    Worried About [facility] in the Last Year: Sometimes true    Ran Out of Food in the Last Year: Sometimes true   Transportation Needs: No Transportation Needs    Lack of Transportation (Medical): No    Lack of Transportation (Non-Medical):  No   Physical Activity: Inactive    Days of Exercise per Week: 0 days    Minutes of Exercise per Session: 0 min   Stress: Stress Concern Present    Feeling of Stress : Rather much   Social Connections: Unknown    Frequency of Communication with Friends and Family: Once a week    Attends [employer] Services: Never    Attends Club or Organization Meetings: Never    Marital Status: Never    Housing Stability: Low Risk     Unable to Pay for Housing in the Last Year: No    Number of Places Lived in the Last Year: 1    Unstable Housing in the Last Year: No        SURGICAL HISTORY  Past Surgical History:   Procedure Laterality Date    ACHILLES TENDON SURGERY Right 1/8/2021    RIGHT ACHILLES TENDON LENGTHENING REPAIR performed by Patsy Felix DPM at [facility]  03/2018    [facility]      teeth extractions -half per patient    HERNIA REPAIR Bilateral 5/27/2020    BIALTERAL HERNIA INGUINAL REPAIR performed by Cade Treviño MD at [address] [place] [address] Right 9/2/2022    LEG AMPUTATION BELOW KNEE performed by Micah Newman MD at [address] [place] [address] Right 9/5/2022    LEG AMPUTATION BELOW KNEE REVISION performed by Micah Newman MD at [facility] 2018    UT OFFICE/OUTPT VISIT,PROCEDURE ONLY Left 4/17/2018    L5-S1 HEMILAMINECTOMY, REMOVAL OF DISC LEFT SIDE performed by Marques Jackson MD at [address] Right 1/8/2021    RIGHT TRANSMETATARSAL TOE AMPUTATION performed by Saulo Bloom DPM at [address] Left 4/23/2022    LEFT RAY GREAT TOE AMPUTATION performed by Tiffany Su MD at [facility]  Current Outpatient Medications   Medication Sig Dispense Refill    sulfamethoxazole-trimethoprim (BACTRIM DS) 800-160 MG per tablet Take 1 tablet by mouth 2 times daily for 10 days 20 tablet 0    cephALEXin (KEFLEX) 500 MG capsule Take 1 capsule by mouth 3 times daily 30 capsule 0    prazosin (MINIPRESS) 1 MG capsule Take 1 capsule by mouth nightly 30 capsule 5    QUEtiapine (SEROQUEL) 200 MG tablet Take 1 tablet by mouth 2 times daily 60 tablet 5    carvedilol (COREG) 12.5 MG tablet Take 1 tablet by mouth 2 times daily (with meals) 60 tablet 0    losartan (COZAAR) 100 MG tablet Take 1 tablet by mouth daily 30 tablet 5    clopidogrel (PLAVIX) 75 MG tablet Take 1 tablet by mouth daily 30 tablet 5    atorvastatin (LIPITOR) 40 MG tablet Take 1 tablet by mouth nightly 30 tablet 5    ondansetron (ZOFRAN) 4 MG tablet Take 1 tablet by mouth daily as needed for Nausea or Vomiting 30 tablet 0    dulaglutide (TRULICITY) 1.5 FN/8.2VB SC injection Inject 0.5 mLs into the skin once a week 5 Adjustable Dose Pre-filled Pen Syringe 5    Blood Pressure KIT 1 kit by Does not apply route daily 1 kit 0    pantoprazole (PROTONIX) 40 MG tablet Take 1 tablet by mouth every morning (before breakfast) 30 tablet 5    aspirin 81 MG chewable tablet Take 1 tablet by mouth daily Take 81 mg by mouth daily 30 tablet 2    FLUoxetine (PROZAC) 40 MG capsule Take 2 capsules by mouth daily 30 capsule 0    hydrOXYzine pamoate (VISTARIL) 50 MG capsule Take 50 mg by mouth every 6 hours as needed for Anxiety      docusate sodium (COLACE, DULCOLAX) 100 MG CAPS Take 100 mg by mouth daily      Alcohol Swabs PADS       blood glucose monitor strips Test 2 times a day & as needed for symptoms of irregular blood glucose. Dispense sufficient amount for indicated testing frequency plus additional to accommodate PRN testing needs. 100 strip 0    FreeStyle Lancets MISC 1 each by Does not apply route daily 100 each 3     No current facility-administered medications for this visit. ALLERGIES  No Known Allergies    PHYSICAL EXAM    /84   Pulse 88   Ht 6' (1.829 m)   SpO2 99%   BMI 21.43 kg/m²     Constitutional:  Well developed, well nourished. Pleasant and cooperative. No acute distress. HENT:  Normocephalic, atraumatic  Eyes:  conjunctiva normal, no discharge, no scleral icterus  Neck:  No tenderness, supple  Lymphatic:  No lymphadenopathy noted  Cardiovascular:  Normal heart rate, normal rhythm, no murmurs, gallops or rubs  Thorax & Lungs:  Normal breath sounds, no respiratory distress, no wheezing  Abdomen:  Soft, no tenderness, no masses, no pulsatile masses, not distended, bowel sounds normal  Skin: 1 inch round area of erythema left side of neck. Several inches from the spine. Very indurated. No fluctuance. No streaking of the skin. Minimal tenderness to palpation. No obvious foreign bodies. Neurologic:  Alert & oriented   Psychiatric:  Affect normal, mood normal    ASSESSMENT & PLAN    Mary Jane Gallardo was seen today for insect bite. Diagnoses and all orders for this visit:    Abscess of skin of neck  -     sulfamethoxazole-trimethoprim (BACTRIM DS) 800-160 MG per tablet; Take 1 tablet by mouth 2 times daily for 10 days  -     cephALEXin (KEFLEX) 500 MG capsule;  Take 1 capsule by mouth 3 times daily  -     Lexus Joseph MD, General Surgery, [place]     Abscess which does not appear ready for incision and drainage. Patient was encouraged to apply intermittent application of warm to hot compresses to the affected area. Initiate Bactrim DS and Keflex as prescribed. If the area becomes fluctuant or is not improving, would follow-up with general surgeon next week. Referral placed. ED for any sudden changes. Medications Discontinued During This Encounter   Medication Reason    sucralfate (CARAFATE) 1 GM/10ML suspension Therapy completed        No follow-ups on file. Plan of care reviewed with patient who verbalizes understanding and wishes to continue. Please note that this chart was generated using dragon dictation software. Although every effort was made to ensure the accuracy of this automated transcription, some errors in transcription may have occurred.     Electronically signed by Lubna Cox PA-C on 1/20/2023

## 2023-01-23 ENCOUNTER — HOSPITAL ENCOUNTER (EMERGENCY)
Age: 43
Discharge: HOME | End: 2023-01-23
Payer: MEDICARE

## 2023-01-23 VITALS
DIASTOLIC BLOOD PRESSURE: 99 MMHG | TEMPERATURE: 98.9 F | OXYGEN SATURATION: 99 % | BODY MASS INDEX: 23.03 KG/M2 | RESPIRATION RATE: 16 BRPM | SYSTOLIC BLOOD PRESSURE: 149 MMHG | HEIGHT: 72 IN | HEART RATE: 93 BPM | WEIGHT: 170 LBS

## 2023-01-23 DIAGNOSIS — L02.414 ABSCESS OF LEFT SHOULDER: Primary | ICD-10-CM

## 2023-01-23 PROCEDURE — 2500000003 HC RX 250 WO HCPCS: Performed by: PHYSICIAN ASSISTANT

## 2023-01-23 PROCEDURE — 6370000000 HC RX 637 (ALT 250 FOR IP): Performed by: PHYSICIAN ASSISTANT

## 2023-01-23 PROCEDURE — 6360000002 HC RX W HCPCS: Performed by: PHYSICIAN ASSISTANT

## 2023-01-23 PROCEDURE — 99284 EMERGENCY DEPT VISIT MOD MDM: CPT | Performed by: PHYSICIAN ASSISTANT

## 2023-01-23 PROCEDURE — 90715 TDAP VACCINE 7 YRS/> IM: CPT | Performed by: PHYSICIAN ASSISTANT

## 2023-01-23 PROCEDURE — 10061 I&D ABSCESS COMP/MULTIPLE: CPT

## 2023-01-23 PROCEDURE — 90471 IMMUNIZATION ADMIN: CPT | Performed by: PHYSICIAN ASSISTANT

## 2023-01-23 RX ORDER — SULFAMETHOXAZOLE AND TRIMETHOPRIM 800; 160 MG/1; MG/1
1 TABLET ORAL ONCE
Status: COMPLETED | OUTPATIENT
Start: 2023-01-23 | End: 2023-01-23

## 2023-01-23 RX ORDER — SULFAMETHOXAZOLE AND TRIMETHOPRIM 800; 160 MG/1; MG/1
1 TABLET ORAL 2 TIMES DAILY
Qty: 14 TABLET | Refills: 0 | Status: SHIPPED | OUTPATIENT
Start: 2023-01-23 | End: 2023-01-30

## 2023-01-23 RX ADMIN — TETANUS TOXOID, REDUCED DIPHTHERIA TOXOID AND ACELLULAR PERTUSSIS VACCINE, ADSORBED 0.5 ML: 5; 2.5; 8; 8; 2.5 SUSPENSION INTRAMUSCULAR at 13:44

## 2023-01-23 RX ADMIN — LIDOCAINE HYDROCHLORIDE 5 ML: 10 INJECTION, SOLUTION EPIDURAL; INFILTRATION; INTRACAUDAL; PERINEURAL at 13:44

## 2023-01-23 RX ADMIN — SULFAMETHOXAZOLE AND TRIMETHOPRIM 1 TABLET: 800; 160 TABLET ORAL at 13:44

## 2023-01-23 ASSESSMENT — ENCOUNTER SYMPTOMS
VOMITING: 0
TROUBLE SWALLOWING: 0
NAUSEA: 0
SHORTNESS OF BREATH: 0
COUGH: 0

## 2023-01-23 NOTE — ED NOTES
[id number] called [address] [facility] for BLS unit to transport patient home to private residence. Spoke with Aston Anaya at intake. ETA 30minutes.       Rj Solares  01/23/23 [id number]

## 2023-01-23 NOTE — ED PROVIDER NOTES
**ADVANCED PRACTICE PROVIDER, I HAVE EVALUATED THIS PATIENT**        [address] ENCOUNTER      Pt Name: Mitzi Palumbo  IET:[id number]  [place] 1980  Date of evaluation: 1/23/2023  Provider: Michelle Gustafson PA-C      Chief Complaint:    Chief Complaint   Patient presents with    Insect Bite     States he has a spider bite that has been there a couple days. States that it is now oozing         Nursing Notes, Past Medical Hx, Past Surgical Hx, Social Hx, Allergies, and Family Hx were all reviewed and agreed with or any disagreements were addressed in the HPI.    HPI: (Location, Duration, Timing, Severity, Quality, Assoc Sx, Context, Modifying Factors)             Mitzi Palumbo is a 43 y.o. male medical history of insulin-dependent type 2 diabetes who presents with complaint of abscess or left shoulder. He mentions this is an ongoing for 2 days. He mentions he had 1 several years ago need to be drained on his chest.  He denies any injury or trauma drainage or discharge, fevers, joint pain, arm swelling, neck pain, difficulty breathing, difficulty swallowing, headache.     PastMedical/Surgical History:      Diagnosis Date    Abscess of left foot 4/22/2022    Anemia associated with acute blood loss 4/26/2022    Callus of foot     Right foot - see's Dr. Mai Stephenson    Cellulitis of left foot 4/22/2022    Diabetic ulcer of left midfoot associated with type 2 diabetes mellitus, with muscle involvement without evidence of necrosis ([address] 4/26/2022    Diabetic ulcer of left midfoot associated with type 2 diabetes mellitus, with necrosis of muscle ([address] 6/7/2021    Diabetic ulcer of right midfoot associated with type 2 diabetes mellitus, with fat layer exposed ([address] 8/11/2020    Dizziness     positional    Essential hypertension     Follows with PCP    Hyperlipidemia     Lumbar radiculopathy     Septic embolism ([address] 6/13/2020    Subacute osteomyelitis of left foot ([facility] 4/22/2022         Procedure Laterality Date    ACHILLES TENDON SURGERY Right 1/8/2021    RIGHT ACHILLES TENDON LENGTHENING REPAIR performed by John Vilchis DPM at [address] [facility]  03/2018    [facility]    DENTAL SURGERY      teeth extractions -half per patient    HERNIA REPAIR Bilateral 5/27/2020    BIALTERAL HERNIA INGUINAL REPAIR performed by Kenisha Castillo MD at [address] Right 9/2/2022    LEG AMPUTATION BELOW KNEE performed by Amisha Candleario MD at [address] [facility] Right 9/5/2022    LEG AMPUTATION BELOW KNEE REVISION performed by Amisha Candelario MD at [facility] 2018    MS OFFICE/OUTPT VISIT,PROCEDURE ONLY Left 4/17/2018    L5-S1 HEMILAMINECTOMY, REMOVAL OF DISC LEFT SIDE performed by Jose L Cole MD at [address] Right 1/8/2021    RIGHT TRANSMETATARSAL TOE AMPUTATION performed by John Vilchis DPM at [address] Left 4/23/2022    LEFT RAY GREAT TOE AMPUTATION performed by Celestino Cárdenas MD at [facility]         Medications:  Discharge Medication List as of 1/23/2023  1:33 PM        CONTINUE these medications which have NOT CHANGED    Details   cephALEXin (KEFLEX) 500 MG capsule Take 1 capsule by mouth 3 times daily, Disp-30 capsule, R-0Normal      prazosin (MINIPRESS) 1 MG capsule Take 1 capsule by mouth nightly, Disp-30 capsule, R-5Normal      QUEtiapine (SEROQUEL) 200 MG tablet Take 1 tablet by mouth 2 times daily, Disp-60 tablet, R-5Normal      carvedilol (COREG) 12.5 MG tablet Take 1 tablet by mouth 2 times daily (with meals), Disp-60 tablet, R-0Normal      losartan (COZAAR) 100 MG tablet Take 1 tablet by mouth daily, Disp-30 tablet, R-5Normal      clopidogrel (PLAVIX) 75 MG tablet Take 1 tablet by mouth daily, Disp-30 tablet, R-5Normal      atorvastatin (LIPITOR) 40 MG tablet Take 1 tablet by mouth nightly, Disp-30 tablet, R-5Normal      ondansetron (ZOFRAN) 4 MG tablet Take 1 tablet by mouth daily as needed for Nausea or Vomiting, Disp-30 tablet, R-0Normal      dulaglutide (TRULICITY) 1.5 MR/5.3DK SC injection Inject 0.5 mLs into the skin once a week, Disp-5 Adjustable Dose Pre-filled Pen Syringe, R-5Normal      Blood Pressure KIT DAILY Starting Tue 1/10/2023, Disp-1 kit, R-0, Normal      pantoprazole (PROTONIX) 40 MG tablet Take 1 tablet by mouth every morning (before breakfast), Disp-30 tablet, R-5Normal      aspirin 81 MG chewable tablet Take 1 tablet by mouth daily Take 81 mg by mouth daily, Disp-30 tablet, R-2Normal      FLUoxetine (PROZAC) 40 MG capsule Take 2 capsules by mouth daily, Disp-30 capsule, R-0Pt takes 40 mg & 20 mg for total 60 mg qdNormal      hydrOXYzine pamoate (VISTARIL) 50 MG capsule Take 50 mg by mouth every 6 hours as needed for AnxietyHistorical Med      docusate sodium (COLACE, DULCOLAX) 100 MG CAPS Take 100 mg by mouth dailyOTC      Alcohol Swabs PADS Starting Wed 4/27/2022, Historical Med      blood glucose monitor strips Test 2 times a day & as needed for symptoms of irregular blood glucose. Dispense sufficient amount for indicated testing frequency plus additional to accommodate PRN testing needs. , Disp-100 strip, R-0, Normal      FreeStyle Lancets MISC DAILY Starting Mon 6/15/2020, Disp-100 each, R-3, Normal               Review of Systems:  (1 systems needed)  Review of Systems   Constitutional:  Negative for fever. HENT:  Negative for trouble swallowing. Respiratory:  Negative for cough and shortness of breath. Cardiovascular:  Negative for leg swelling. Gastrointestinal:  Negative for nausea and vomiting. Skin:  Positive for wound (chronic- R BKA). Neurological:  Negative for weakness. \"Positives and Pertinent negatives as per HPI\"    Physical Exam:  Physical Exam  Vitals and nursing note reviewed.    Constitutional:       Appearance: Normal appearance. He is well-developed. He is not ill-appearing or diaphoretic. HENT:      Head: Normocephalic and atraumatic. Jaw: There is normal jaw occlusion. Right Ear: External ear normal.      Left Ear: External ear normal.      Nose: Nose normal.   Eyes:      General:         Right eye: No discharge. Left eye: No discharge. Pulmonary:      Effort: Pulmonary effort is normal. No respiratory distress. Breath sounds: No stridor. Musculoskeletal:        Arms:       Cervical back: Normal range of motion and neck supple. Comments: Right BKA: dressing clean dry in place, no erythema or lymphangitis streaking or drainage      Right Lower Extremity: Right leg is amputated below knee. Skin:     General: Skin is warm and dry. Coloration: Skin is not pale. Neurological:      General: No focal deficit present. Mental Status: He is alert and oriented to person, place, and time. Psychiatric:         Mood and Affect: Mood normal.         Behavior: Behavior normal.       ED COURSE / MEDICAL DECISION MAKING :   Vitals:    01/23/23 1224   BP: (!) 149/99   Pulse: 93   Resp: 16   Temp: 98.9 °F (37.2 °C)   TempSrc: Oral   SpO2: 99%   Weight: 170 lb (77.1 kg)   Height: 6' (1.829 m)       LABS:Labs Reviewed - No data to display     Remainder of labs reviewed and were negative at this time or not returned at the time of this note. RADIOLOGY:   Non-plain film images such as CT, Ultrasound and MRI are read by the radiologist. Nathen Solano PA-C have directly visualized the radiologic plain film image(s) with the below findings:    Interpretation per the Radiologist below, if available at the time of this note:    No orders to display        No results found.        PROCEDURES:   Incision/Drainage    Date/Time: 1/26/2023 1:13 AM  Performed by: Braulio Freeman PA-C  Authorized by: Braulio Freeman PA-C     Consent:     Consent obtained:  Verbal    Consent given by:  Patient Risks, benefits, and alternatives were discussed: yes      Risks discussed:  Bleeding, infection and incomplete drainage    Alternatives discussed:  No treatment  Universal protocol:     Procedure explained and questions answered to patient or proxy's satisfaction: yes      Patient identity confirmed:  Verbally with patient  Location:     Type:  Abscess    Location:  Upper extremity    Upper extremity location:  Shoulder  Pre-procedure details:     Skin preparation:  Povidone-iodine  Sedation:     Sedation type:  None  Anesthesia:     Anesthesia method:  Local infiltration    Local anesthetic:  Lidocaine 1% w/o epi  Procedure details:     Incision types:  Cruciate    Incision depth:  Dermal    Wound management:  Probed and deloculated    Drainage:  Bloody and purulent    Drainage amount:  Scant    Packing materials:  1/4 in iodoform gauze  Post-procedure details:     Procedure completion:  Tolerated well, no immediate complications    None    Patient was given:  Medications   lidocaine 1 % injection 10 mL (5 mLs IntraDERmal Given 1/23/23 1344)   tetanus-diphth-acell pertussis (BOOSTRIX) injection 0.5 mL (0.5 mLs IntraMUSCular Given 1/23/23 1344)   sulfamethoxazole-trimethoprim (BACTRIM DS;SEPTRA DS) 800-160 MG per tablet 1 tablet (1 tablet Oral Given 1/23/23 1344)         HPI SUMMARY, DDX,  REASSESSMENT, MEDICAL DECISION MAKING :     Patient with history as above presented with Insect Bite (States he has a spider bite that has been there a couple days. States that it is now oozing)    Patient was nontoxic, stable. History and Exam as above. I reviewed external records. Differential diagnosis considered. ,  Cellulitis, abscess, adenitis, insect/arthropod bite sebaceous cyst    Overall presentation is consistent with abscess. It potentially could be a sebaceous that is now infected at mention that there febrile. I do feel he would benefit from incision and drainage. Patient was agreeable to this as well.   Low suspicion for sepsis, septic arthritis, deep space infection. Sandhya Thomas He does have a chronic wound on his right below-knee potation site that he sees wound care. He denies any worsening of this today. I did offer to be evaluated but he declined, concerned with the infection on his shoulder. Patient was treated with I&D with improvement in symptoms. His tetanus was up dated. Consideration was given for lab work and  imaging but patient appeared non toxic, was afebrile, and exam revealed superficial infection . The patient was stable for outpatient management. Given his prediabetes, do feel antibiotics would be warranted as well as close follow-up. He is actually scheduled to see his wound clinic tomorrow. I advised that he have his evaluated and repacked as necessary tomorrow, follow-up general surgery for reevaluation if needed cyst removal if no improvement. Disposition: Discussed need to follow up diagnostics, including incidental findings. Discharged with instructions to obtain outpatient follow up of patient's symptoms and findings, with strict return precautions if patient develops new or worsening symptoms. Follow-up plan and return precautions were provided and discussed in detail patient in agreement. Patient was given the following medications:  Medications   lidocaine 1 % injection 10 mL (5 mLs IntraDERmal Given 1/23/23 1344)   tetanus-diphth-acell pertussis (BOOSTRIX) injection 0.5 mL (0.5 mLs IntraMUSCular Given 1/23/23 1344)   sulfamethoxazole-trimethoprim (BACTRIM DS;SEPTRA DS) 800-160 MG per tablet 1 tablet (1 tablet Oral Given 1/23/23 1344)       Independent Imaging Interpretation by me:     EKG: When ordered, EKG's are interpreted by the Emergency Department Physician in the absence of a cardiologist.  Please see their note for interpretation of EKG.     Chronic conditions affecting care:    has a past medical history of Abscess of left foot (4/22/2022), Anemia associated with acute blood loss (4/26/2022), Callus of foot, Cellulitis of left foot (4/22/2022), Diabetic ulcer of left midfoot associated with type 2 diabetes mellitus, with muscle involvement without evidence of necrosis ([address] (4/26/2022), Diabetic ulcer of left midfoot associated with type 2 diabetes mellitus, with necrosis of muscle ([address] (6/7/2021), Diabetic ulcer of right midfoot associated with type 2 diabetes mellitus, with fat layer exposed ([address] (8/11/2020), Dizziness, Essential hypertension, Hyperlipidemia, Lumbar radiculopathy, Septic embolism ([address] (6/13/2020), and Subacute osteomyelitis of left foot ([address] (4/22/2022). Payor: MEDICARE / Plan: MEDICARE PART A AND B /  /    MEDICAID [place]           Disposition Considerations (tests considered but not done, Shared Decision Making, Pt Expectation of Test or Tx.):                   The patient tolerated their visit well. I evaluated the patient. The physician was available for consultation as needed. The patient and / or the family were informed of the results of any tests, a time was given to answer questions, a plan was proposed and they agreed with plan. I am the Primary Clinician of Record. CLINICAL IMPRESSION:  1.  Abscess of left shoulder        DISPOSITION Decision To Discharge 01/23/2023 02:11:00 PM      PATIENT REFERRED TO:  [address]  [address] [place] [address], APRN - CNP  R Kemi Bowie [facility]  [phone]          DISCHARGE MEDICATIONS:  Discharge Medication List as of 1/23/2023  1:33 PM          DISCONTINUED MEDICATIONS:  Discharge Medication List as of 1/23/2023  1:33 PM        STOP taking these medications       metFORMIN (GLUCOPHAGE) 500 MG tablet Comments:   Reason for Stopping:         metoclopramide (REGLAN) 10 MG tablet Comments:   Reason for Stopping:         pioglitazone (ACTOS) 30 MG tablet Comments:   Reason for Stopping:         acetaminophen (TYLENOL) 325 MG tablet Comments:   Reason for Stopping:                      (Please note the MDM and HPI sections of this note were completed with a voice recognition program.  Efforts were made to edit the dictations but occasionally words are mis-transcribed.)    Electronically signed, Rupa Salvador PA-C,          Rupa Salvador PA-C  01/26/23 0123       Rupa Salvador PA-C  01/26/23 0125

## 2023-01-23 NOTE — ED NOTES
Pt requested ambulance transport back to private residence. Pt informed this Paramedic and Neva Aranda RN that a wheelchair would be needed for him to sit in the waiting room due to a ride coming for him. Pt informed that [facility] would be here within 20 mins per his request. Pt denied needing transport and stated to \"cancel that ride, I have someone coming. \" [facility] notified and stated she would call [place] immediately.     Petrona Shaw, FREDDIE Rooney  01/23/23 5484

## 2023-01-23 NOTE — DISCHARGE INSTRUCTIONS
As we discussed, you should have a repeat wound check in 2 to 4 days for any persisting symptoms. You can do this at your wound care, or follow-up with your primary care provider. If needed, return to this emergency department for reevaluation. Return to emergency department if you develop any fever, abdominal pain, confusion, worsening symptoms or any new concerns. Take antibiotics as directed, apply warm compress or 4-6 times a day to help with healing. You could use ibuprofen or Tylenol to help with any pain. If you need additional pain relief, use the prescription pain medications.

## 2023-01-24 ENCOUNTER — CARE COORDINATION (OUTPATIENT)
Dept: CARE COORDINATION | Age: 43
End: 2023-01-24

## 2023-01-24 ENCOUNTER — HOSPITAL ENCOUNTER (OUTPATIENT)
Dept: WOUND CARE | Age: 43
Discharge: HOME | End: 2023-01-24
Payer: MEDICARE

## 2023-01-24 VITALS
HEART RATE: 93 BPM | RESPIRATION RATE: 16 BRPM | TEMPERATURE: 99.1 F | SYSTOLIC BLOOD PRESSURE: 135 MMHG | DIASTOLIC BLOOD PRESSURE: 88 MMHG

## 2023-01-24 DIAGNOSIS — E11.621 DIABETIC ULCER OF LEFT MIDFOOT ASSOCIATED WITH TYPE 2 DIABETES MELLITUS, WITH FAT LAYER EXPOSED (HCC): Primary | ICD-10-CM

## 2023-01-24 DIAGNOSIS — L97.522 DIABETIC ULCER OF TOE OF LEFT FOOT ASSOCIATED WITH TYPE 2 DIABETES MELLITUS, WITH FAT LAYER EXPOSED (HCC): ICD-10-CM

## 2023-01-24 DIAGNOSIS — L02.11 ABSCESS, NECK: ICD-10-CM

## 2023-01-24 DIAGNOSIS — Z89.432 PARTIAL NONTRAUMATIC AMPUTATION OF FOOT, LEFT (HCC): ICD-10-CM

## 2023-01-24 DIAGNOSIS — E11.621 DIABETIC ULCER OF TOE OF LEFT FOOT ASSOCIATED WITH TYPE 2 DIABETES MELLITUS, WITH FAT LAYER EXPOSED (HCC): ICD-10-CM

## 2023-01-24 DIAGNOSIS — L97.422 DIABETIC ULCER OF LEFT MIDFOOT ASSOCIATED WITH TYPE 2 DIABETES MELLITUS, WITH FAT LAYER EXPOSED (HCC): Primary | ICD-10-CM

## 2023-01-24 DIAGNOSIS — L03.116 CELLULITIS OF LEFT FOOT: ICD-10-CM

## 2023-01-24 PROCEDURE — 87070 CULTURE OTHR SPECIMN AEROBIC: CPT

## 2023-01-24 PROCEDURE — 11042 DBRDMT SUBQ TIS 1ST 20SQCM/<: CPT | Performed by: NURSE PRACTITIONER

## 2023-01-24 PROCEDURE — 10060 I&D ABSCESS SIMPLE/SINGLE: CPT

## 2023-01-24 PROCEDURE — 11042 DBRDMT SUBQ TIS 1ST 20SQCM/<: CPT

## 2023-01-24 PROCEDURE — 97605 NEG PRS WND THER DME<=50SQCM: CPT

## 2023-01-24 PROCEDURE — 6370000000 HC RX 637 (ALT 250 FOR IP): Performed by: NURSE PRACTITIONER

## 2023-01-24 PROCEDURE — 87077 CULTURE AEROBIC IDENTIFY: CPT

## 2023-01-24 PROCEDURE — 10060 I&D ABSCESS SIMPLE/SINGLE: CPT | Performed by: NURSE PRACTITIONER

## 2023-01-24 PROCEDURE — 87075 CULTR BACTERIA EXCEPT BLOOD: CPT

## 2023-01-24 RX ORDER — BACITRACIN, NEOMYCIN, POLYMYXIN B 400; 3.5; 5 [USP'U]/G; MG/G; [USP'U]/G
OINTMENT TOPICAL ONCE
OUTPATIENT
Start: 2023-01-24 | End: 2023-01-24

## 2023-01-24 RX ORDER — LIDOCAINE 50 MG/G
OINTMENT TOPICAL ONCE
Status: COMPLETED | OUTPATIENT
Start: 2023-01-24 | End: 2023-01-24

## 2023-01-24 RX ORDER — LIDOCAINE 50 MG/G
OINTMENT TOPICAL ONCE
OUTPATIENT
Start: 2023-01-24 | End: 2023-01-24

## 2023-01-24 RX ORDER — GINSENG 100 MG
CAPSULE ORAL ONCE
OUTPATIENT
Start: 2023-01-24 | End: 2023-01-24

## 2023-01-24 RX ORDER — BACITRACIN ZINC AND POLYMYXIN B SULFATE 500; 1000 [USP'U]/G; [USP'U]/G
OINTMENT TOPICAL ONCE
OUTPATIENT
Start: 2023-01-24 | End: 2023-01-24

## 2023-01-24 RX ORDER — CLOBETASOL PROPIONATE 0.5 MG/G
OINTMENT TOPICAL ONCE
OUTPATIENT
Start: 2023-01-24 | End: 2023-01-24

## 2023-01-24 RX ORDER — LIDOCAINE HYDROCHLORIDE 40 MG/ML
SOLUTION TOPICAL ONCE
OUTPATIENT
Start: 2023-01-24 | End: 2023-01-24

## 2023-01-24 RX ORDER — BETAMETHASONE DIPROPIONATE 0.05 %
OINTMENT (GRAM) TOPICAL ONCE
OUTPATIENT
Start: 2023-01-24 | End: 2023-01-24

## 2023-01-24 RX ORDER — LIDOCAINE HYDROCHLORIDE 20 MG/ML
JELLY TOPICAL ONCE
OUTPATIENT
Start: 2023-01-24 | End: 2023-01-24

## 2023-01-24 RX ORDER — LIDOCAINE 40 MG/G
CREAM TOPICAL ONCE
OUTPATIENT
Start: 2023-01-24 | End: 2023-01-24

## 2023-01-24 RX ORDER — GENTAMICIN SULFATE 1 MG/G
OINTMENT TOPICAL ONCE
OUTPATIENT
Start: 2023-01-24 | End: 2023-01-24

## 2023-01-24 RX ADMIN — LIDOCAINE: 50 OINTMENT TOPICAL at 09:52

## 2023-01-24 ASSESSMENT — PAIN DESCRIPTION - PAIN TYPE: TYPE: CHRONIC PAIN

## 2023-01-24 ASSESSMENT — PAIN DESCRIPTION - ONSET: ONSET: ON-GOING

## 2023-01-24 ASSESSMENT — PAIN DESCRIPTION - FREQUENCY: FREQUENCY: INTERMITTENT

## 2023-01-24 ASSESSMENT — PAIN - FUNCTIONAL ASSESSMENT: PAIN_FUNCTIONAL_ASSESSMENT: PREVENTS OR INTERFERES SOME ACTIVE ACTIVITIES AND ADLS

## 2023-01-24 NOTE — PROGRESS NOTES
Negative Pressure Wound Therapy    NAME:  Jono Murphy OF BIRTH:  1980  MEDICAL RECORD NUMBER:  [id number]  DATE:  1/24/2023    Applied Negative Pressure to right BKA wound(s)/ulcer(s). [x] Applied skin barrier prep to serenity-wound. [x] Cut strips of plastic drape to picture frame wound so that serenity-wound is     covered with the drape. [x] If bridging dressing to less prominent site, cover any intact skin that will come in contact with the Negative Pressure Therapy sponge, gauze or channel drain with plastic drape. The sponge should never touch intact skin. [x] Cut sponge, gauze or channel drain to size which will fit into the wound/ulcer bed without being forced. [x] Be sure the sponge is large enough to hold the entire round plastic flange which is attached to the tubing. Never allow flange to be larger than the sponge or it will produce suction damaging intact skin. Total number of individual pieces of foam used within the wound bed: 1    [x] If bridging the dressing away from the primary site, be sure the bridge leads to a piece of sponge large enough to hold the entire flange without allowing any of the flange to overlap onto intact skin. [x] Covered sponge, gauze or channel drain with plastic drape. [x] Cut a hole in this plastic drape directly over the sponge the same size as the plastic drain tubing. Removed plastic liner from flange a[x]nd apply it directly over the hole you cut. [x] Removed the plastic cover from the flange. [x] Attached the tubing to the wound/ulcer Negative Pressure Therapy and turn it on to be sure a vacuum is created and that there are no leaks. [x] If air leaks occur, use plastic drape to patch them. [x] Secured Negative Pressure Therapy dressing with ace wrap loosely if located on an extremity. Maintain tubing outside of ace wrap. Tubing must not exert pressure on intact skin.     Applied per  Guidelines      Electronically signed by Carolina Ortega LPN on 9/87/8471 at 16:72 AM

## 2023-01-24 NOTE — PROGRESS NOTES
Wound Care Center Progress Note With Procedure    Ivy Winston  AGE: 43 y.o. GENDER: male  : 1980  EPISODE DATE:  2023     Subjective:     Chief Complaint   Patient presents with    Wound Check     Right BKA site         HISTORY of PRESENT ILLNESS      Ivy Winston is a 43 y.o. male who presents today for wound evaluation of Chronic diabetic, pressure and non-healing surgical ulcer(s) of the left medial foot and lateral plantar surface. The ulcer is of marked severity. The underlying cause of the wound is diabetes, non-healing surgical. He presents today with a new wound to the right plantar foot that is diabetic and pressure in nature and of moderate severity. The patient has significant underlying medical conditions as below. The patient is having significant depression related to the recent and sudden death of his mother. 23: Patient has an abscess left shoulder/neck area. The patient was seen by [address] [place] [address] [facility] for a possible spider bite was referred to surgical consult and was to start Keflex and Bactrim. He then went to the ED 23 with I&D abscess. Today the abscess has not been completely evacuated with repeat I&D as below. 10/20/22: The patient was here last 10/4/22 and his right BKA site was well approximated, staples removed and steri strips. The prosthetic supplier was with him and plans had started to get fitted. Unfortunately, the patient fell the same night at home and the wound dehisced. He was evaluated at the ED. The patient then was not seen at the wound clinic for a few weeks as he was hospitalized for suicidal ideation. Today able to probe and visibly see bone. Home Health still in place, will apply for a wound vac. Will also start Bactrim. 22: Since his last visit to the wound clinic 22 the patient was hospitalized -22) at [facility] with necrotizing fascitis right lower leg with resulting right BKA.  Arina amputation of the right lower limb below the knee by Dr. Madisyn Turcios. She placed a wound VAC to on the stump at that time. On 9/5/2022 Dr. Teresita Castaneda performed a primary closure of his right BKA stump. Wound Pain Timing/Severity: none  Quality of pain: N/A  Severity of pain:  0 / 10   Modifying Factors: diabetes and chronic pressure  Associated Signs/Symptoms: drainage     Diabetes: Yes, on an oral and insulin regimen, last A1c 10.5 as of 7/28/22  Diabetes education provided today:    Diabetes pathoetiology, difference between type 1 and type 2 diabetes, and progressive nature of Type 2 DM. Diabetic Neuropathy: signs and therapy. Foot care: advised to wash feet daily, pat dry and apply lotion at night, avoiding between toes. Need to look at feet daily and report to a physician any signs of inflammation or skin damage. Discussed diabetes shoes and socks. Diabetic management related to wound care    Smoking: Never smoker  Obesity:No  Anticoagulant therapy: No  Immunosuppression: No    Patient educated on the 6 essential components necessary for wound healing: Circulation, Debridements, Proper Dressings and Topical Wound Products, Infection Control, Edema Control and Offloading. Patient educated on those factors that negatively effect or impact wound healing: smoking, obesity, uncontrolled diabetes, anticoagulant and immunosuppressive regimens, inadequate nutrition, untreated arterial and venous disease if applicable and measures to manage edema. Nutritional status: well nourished. Discussed need for increased protein and calories for wound healing and good sources of protein (just over 7 grams for every 20 pounds of body weight). Animal-based foods high in protein (meat, poultry, fish, eggs, and dairy foods). Plant based foods high in protein (tofu, lentils, beans, chickpeas, nuts, quinoa and den seeds.      Off Loading  Offloading or minimizing or removing weight placed on an area with poor circulation such as diabetic wounds or pressure. This can be achieved with crutches, wheel chair, knee walker etc. Minimizing pressure through partial weight bearing (minimizing the amount of  pressure applied and or the amount of time on the area of pressure) or maintaining a non-weight bearing status can be used to promote and often can be essential for thee wound to heal. Off loading may also need to be achieved for non-weight bearing wounds such as pressure ulcers to the torso. Turning and changing positions frequently, at least every two hours. Use of pressure cushion if sitting up in chair. Skin Care  Keep skin clean and well moisturized , moisturize routinely with ointments for heavier moisturizer needs for extremely dry skin or cracks such as A&D ointment and lotions for a light moisturizer such as CeraVe or Eucerin. If incontinent change incontinence garments as soon as soiled and keeping skin clean and use barrier cream to protect the skin.         PAST MEDICAL HISTORY        Diagnosis Date    Abscess of left foot 4/22/2022    Anemia associated with acute blood loss 4/26/2022    Callus of foot     Right foot - see's Dr. Pitts Press    Cellulitis of left foot 4/22/2022    Diabetic ulcer of left midfoot associated with type 2 diabetes mellitus, with muscle involvement without evidence of necrosis ([address] 4/26/2022    Diabetic ulcer of left midfoot associated with type 2 diabetes mellitus, with necrosis of muscle ([address] 6/7/2021    Diabetic ulcer of right midfoot associated with type 2 diabetes mellitus, with fat layer exposed ([address] 8/11/2020    Dizziness     positional    Essential hypertension     Follows with PCP    Hyperlipidemia     Lumbar radiculopathy     Septic embolism ([address] 6/13/2020    Subacute osteomyelitis of left foot ([address] 4/22/2022       PAST SURGICAL HISTORY    Past Surgical History:   Procedure Laterality Date    ACHILLES TENDON SURGERY Right 1/8/2021    RIGHT ACHILLES TENDON LENGTHENING REPAIR performed by Rodrigo Mars DPM at [address]  03/2018    [facility]    DENTAL SURGERY      teeth extractions -half per patient    HERNIA REPAIR Bilateral 5/27/2020    BIALTERAL HERNIA INGUINAL REPAIR performed by Aida Quinones MD at [address] Right 9/2/2022    LEG AMPUTATION BELOW KNEE performed by Lissette Barahona MD at [address] Right 9/5/2022    LEG AMPUTATION BELOW KNEE REVISION performed by Lissette Barahona MD at [facility] 2018    [place] OFFICE/OUTPT VISIT,PROCEDURE ONLY Left 4/17/2018    L5-S1 HEMILAMINECTOMY, REMOVAL OF [address] LEFT SIDE performed by Krish Levine MD at [address] Right 1/8/2021    RIGHT TRANSMETATARSAL TOE AMPUTATION performed by Rodrigo Mars DPM at [facility] OR    TOE AMPUTATION Left 4/23/2022    LEFT RAY GREAT TOE AMPUTATION performed by Raiza Landers MD at [address]    Family History   Problem Relation Age of Onset    Heart Disease Father     Diabetes Father     Diabetes Mother     Heart Disease Mother     Other Mother     Kidney Disease Mother     Heart Attack Mother        SOCIAL HISTORY    Social History     Tobacco Use    Smoking status: Never    Smokeless tobacco: Never   Vaping Use    Vaping Use: Never used   Substance Use Topics    Alcohol use: Yes     Comment: occasionally    Drug use: No       ALLERGIES    No Known Allergies    MEDICATIONS    Current Outpatient Medications on File Prior to Encounter   Medication Sig Dispense Refill    sulfamethoxazole-trimethoprim (BACTRIM DS) 800-160 MG per tablet Take 1 tablet by mouth 2 times daily for 7 days 14 tablet 0    cephALEXin (KEFLEX) 500 MG capsule Take 1 capsule by mouth 3 times daily 30 capsule 0    prazosin (MINIPRESS) 1 MG capsule Take 1 capsule by mouth nightly 30 capsule 5    QUEtiapine (SEROQUEL) 200 MG tablet Take 1 tablet by mouth 2 times daily 60 tablet 5    carvedilol (COREG) 12.5 MG tablet Take 1 tablet by mouth 2 times daily (with meals) 60 tablet 0    losartan (COZAAR) 100 MG tablet Take 1 tablet by mouth daily 30 tablet 5    clopidogrel (PLAVIX) 75 MG tablet Take 1 tablet by mouth daily 30 tablet 5    atorvastatin (LIPITOR) 40 MG tablet Take 1 tablet by mouth nightly 30 tablet 5    ondansetron (ZOFRAN) 4 MG tablet Take 1 tablet by mouth daily as needed for Nausea or Vomiting 30 tablet 0    dulaglutide (TRULICITY) 1.5 EB/9.1GZ SC injection Inject 0.5 mLs into the skin once a week 5 Adjustable Dose Pre-filled Pen Syringe 5    Blood Pressure KIT 1 kit by Does not apply route daily 1 kit 0    pantoprazole (PROTONIX) 40 MG tablet Take 1 tablet by mouth every morning (before breakfast) 30 tablet 5    aspirin 81 MG chewable tablet Take 1 tablet by mouth daily Take 81 mg by mouth daily 30 tablet 2    FLUoxetine (PROZAC) 40 MG capsule Take 2 capsules by mouth daily 30 capsule 0    hydrOXYzine pamoate (VISTARIL) 50 MG capsule Take 50 mg by mouth every 6 hours as needed for Anxiety      docusate sodium (COLACE, DULCOLAX) 100 MG CAPS Take 100 mg by mouth daily      [DISCONTINUED] metFORMIN (GLUCOPHAGE) 500 MG tablet Take 2 tablets by mouth 2 times daily (with meals) Indications: Start tomorrow 03/14 360 tablet 1    [DISCONTINUED] metoclopramide (REGLAN) 10 MG tablet Take 1 tablet by mouth 4 times daily (before meals and nightly) 120 tablet 3    Alcohol Swabs PADS       blood glucose monitor strips Test 2 times a day & as needed for symptoms of irregular blood glucose. Dispense sufficient amount for indicated testing frequency plus additional to accommodate PRN testing needs.  100 strip 0    [DISCONTINUED] pioglitazone (ACTOS) 30 MG tablet Take 1 tablet by mouth daily 90 tablet 1    FreeStyle Lancets MISC 1 each by Does not apply route daily 100 each 3    [DISCONTINUED] acetaminophen (TYLENOL) 325 MG tablet Take 2 tablets by mouth every 4 hours as needed for Pain or Fever 120 tablet 3     No current facility-administered medications on file prior to encounter. REVIEW OF SYSTEMS    Pertinent items are noted in HPI. Constitutional: Negative for systemic symptoms including fever, chills and malaise. Objective:      /88   Pulse 93   Temp 99.1 °F (37.3 °C) (Temporal)   Resp 16     PHYSICAL EXAM      General: The patient is in no acute distress. Mental status:  Patient is appropriate, is  oriented to place and plan of care. Dermatologic exam: Visual inspection of the periwound reveals the skin to be normal in turgor and texture  Wound exam: see wound description below in procedure note      Assessment:     Problem List Items Addressed This Visit          Endocrine    WD-Diabetic ulcer of left midfoot associated with type 2 diabetes mellitus, with fat layer exposed ([address] - Primary    Relevant Orders    Initiate Outpatient Wound Care Protocol    Neg. Pressure Wound Therapy    Diabetic ulcer of toe of left foot associated with type 2 diabetes mellitus, with fat layer exposed ([address]    Relevant Orders    Initiate Outpatient Wound Care Protocol    Neg. Pressure Wound Therapy       Other    Cellulitis of left foot    Relevant Orders    Initiate Outpatient Wound Care Protocol    Neg. Pressure Wound Therapy    WD-Partial nontraumatic amputation of foot, left ([address]    Relevant Orders    Initiate Outpatient Wound Care Protocol    Neg. Pressure Wound Therapy     Other Visit Diagnoses       Abscess, neck        Relevant Orders    Culture, Wound Aerobic Only          Procedure Note    Indications:  Based on my examination of this patient's wound(s) today, sharp excision into necrotic subcutaneous tissue is required to promote healing and evaluate the extent of previous healing.     Performed by: RAFAEL Haq CNP    Consent obtained: Yes    Time out taken:  Yes    Pain Control: Anesthetic  Anesthetic: Anesthetic  Anesthetic: 5% Lidocaine Ointment Topical Debridement:Excisional Debridement    Using curette the wound(s) was/were sharply debrided down through and including the removal of subcutaneous      Devitalized Tissue Debrided:  fibrin, biofilm, slough, necrotic/eschar, and exudate    Pre Debridement Measurements:  Are located in the Wound Documentation Flow Sheet    All active wounds listed below with today's date are evaluated  Wound(s)    debrided this date include # : 3    Post  Debridement Measurements:  Negative Pressure Wound Therapy Leg Anterior; Lower;Proximal;Right (Active)   Unit Type KCI 01/24/23 1038   Dressing Type Black Foam 01/24/23 1038   Number of pieces used 1 01/24/23 1038   Number of pieces removed 4 01/13/23 0901   Cycle Continuous 01/24/23 1038   Target Pressure (mmHg) 125 01/24/23 1038   Intensity 5 01/24/23 1038   Canister changed? Yes 01/24/23 1038   Dressing Status New dressing applied 01/24/23 1038   Dressing Changed Changed/New 01/24/23 1038   Drainage Amount Moderate 01/24/23 1038   Drainage Description Sanguinous 01/24/23 1038   Dressing Change Due 12/06/22 12/20/22 0938   Wound Assessment Granulation tissue 01/24/23 1038   Rosalie-wound Assessment Intact;Fragile 01/24/23 1038   Odor None 01/24/23 1038   Number of days: 90       Wound 10/20/22 #3 right amp site.  (Active)   Wound Image   01/24/23 0946   Wound Etiology Non-Healing Surgical 01/24/23 1038   Dressing Status New dressing applied 01/24/23 1038   Wound Cleansed Soap and water 01/24/23 0946   Offloading for Diabetic Foot Ulcers Other (comment) 01/24/23 1038   Wound Length (cm) 1.8 cm 01/24/23 0946   Wound Width (cm) 6 cm 01/24/23 0946   Wound Depth (cm) 0.2 cm 01/24/23 0946   Wound Surface Area (cm^2) 10.8 cm^2 01/24/23 0946   Change in Wound Size % (l*w) -92.86 01/24/23 0946   Wound Volume (cm^3) 2.16 cm^3 01/24/23 0946   Wound Healing % 4 01/24/23 0946   Post-Procedure Length (cm) 1 cm 01/24/23 1014   Post-Procedure Width (cm) 5 cm 01/24/23 1014   Post-Procedure Depth (cm) 0.2 cm 01/24/23 1014   Post-Procedure Surface Area (cm^2) 5 cm^2 01/24/23 1014   Post-Procedure Volume (cm^3) 1 cm^3 01/24/23 1014   Distance Tunneling (cm) 0 cm 01/24/23 0946   Tunneling Position ___ O'Clock 0 01/24/23 0946   Undermining Starts ___ O'Clock 0 01/24/23 0946   Undermining Ends___ O'Clock 0 01/24/23 0946   Undermining Maxium Distance (cm) 0 01/24/23 0946   Wound Assessment Pink/red;Slough; Exposed structure bone 01/24/23 0946   Drainage Amount Moderate 01/24/23 0946   Drainage Description Serosanguinous; Yellow 01/24/23 0946   Odor None 01/24/23 0946   Rosalie-wound Assessment Excoriated;Blanchable erythema 01/24/23 0946   Margins Undefined edges 01/24/23 0946   Wound Thickness Description not for Pressure Injury Full thickness 01/24/23 0946   Number of days: 96     Total  Area  Debrided: 5 sq cm     Bleeding:  Minimal    Hemostasis Achieved:  by pressure    Procedural Pain:  0  / 10     Post Procedural Pain:  0 / 10     Response to treatment:  Well tolerated by patient. Incision and Drainage Note    Type of Incision and Drainage:     [x] Simple    [] Complex    [] Hematoma/Seroma  [] Abscess soft tissue deep   [] Abscess ankle       The left shoulder/neck was inspected and appeared acutely fluctuant and erythematous, local anesthetic was infiltrated into the subcutaneous tissues until desired effect had been achieved. Skin was tested for sharp sensation. Performed by: RAFAEL Davis CNP    Consent obtained: Yes    Time out taken: Yes    Pain Control: Anesthetic: 5% Lidocaine Ointment Topical     Drainage Type: copious, thick    Instruments: curette and #15 blade scalpel    An 15-blade scalpel was then used to make an incision parallel to the nail medially and laterally to decompress the fluctuant material. Purulent material was expressed and cultured. Loculations were broken up using a hemostat and more of the material was able to be expressed.  The wound was then irrigated and silver nitrate was used for hemostasis with good effect. Location of Incision and Drainage:    Wound #: 1    Incision and Drainage Size cm  Wound 01/24/23 #1 Left Posterior Neck/Upper Shoulder (Active)   Wound Image   01/24/23 0949   Wound Etiology Other 01/24/23 1038   Dressing Status New dressing applied 01/24/23 1038   Wound Cleansed Wound cleanser 01/24/23 0949   Offloading for Diabetic Foot Ulcers Offloading not required 01/24/23 1038   Wound Length (cm) 0.8 cm 01/24/23 0949   Wound Width (cm) 1 cm 01/24/23 0949   Wound Depth (cm) 0.1 cm 01/24/23 0949   Wound Surface Area (cm^2) 0.8 cm^2 01/24/23 0949   Wound Volume (cm^3) 0.08 cm^3 01/24/23 0949   Post-Procedure Length (cm) 0.8 cm 01/24/23 1014   Post-Procedure Width (cm) 1 cm 01/24/23 1014   Post-Procedure Depth (cm) 1.5 cm 01/24/23 1014   Post-Procedure Surface Area (cm^2) 0.8 cm^2 01/24/23 1014   Post-Procedure Volume (cm^3) 1.2 cm^3 01/24/23 1014   Distance Tunneling (cm) 0 cm 01/24/23 0949   Tunneling Position ___ O'Clock 0 01/24/23 0949   Undermining Starts ___ O'Clock 0 01/24/23 0949   Undermining Ends___ O'Clock 0 01/24/23 0949   Undermining Maxium Distance (cm) 0 01/24/23 0949   Wound Assessment Pink/red;Slough 01/24/23 0949   Drainage Amount Large 01/24/23 0949   Drainage Description Purulent 01/24/23 1014   Odor Mild 01/24/23 0949   Rosalie-wound Assessment Blanchable erythema;Edematous 01/24/23 0949   Margins Undefined edges 01/24/23 0949   Wound Thickness Description not for Pressure Injury Full thickness 01/24/23 0949   Number of days: 0        Culture sent: Yes     Bleeding:with a small amount of bleeding    Hemostasis Achieved: by pressure    Procedural Pain: 3  / 10     Post Procedural Pain: 0 / 10     Response to treatment: Well tolerated by patient. Status of wound progress and description from last visit: BKA site improving, regimen as below, follow up in one week. Abscess left shoulder/neck with I&D as above, regimen as below.  The patient continues to orellana depression post his mother's death. Plan:       Discharge Instructions         PHYSICIAN ORDERS AND DISCHARGE INSTRUCTIONS     NOTE: Upon discharge from the [address], you will receive a patient experience survey. We would be grateful if you would take the time to fill this survey out. Wound cleansing:              Do not scrub or use excessive force. Wash hands with soap and water before and after dressing changes. Prior to applying a clean dressing, cleanse wound with normal saline, wound cleanser, or mild soap and water. Ask the physician or nurse before getting the wound(s) wet in a shower     Daily Wound management:              Keep weight off wounds and reposition every 2 hours. Avoid standing for long periods of time. Apply wraps/stockings in AM and remove at bedtime. If swelling is present, elevate legs to the level of the heart or above for 30 minutes 4-5 times a day and/or when sitting. When taking antibiotics take entire prescription as ordered by physician do not stop taking until medicine is all gone. Wound Care Notes:  Rx: Bill Tom  Apply for grafts 05/11/22: aLL GRAFTS approved 06/08/22 for Left Toe amp site  Reapply for graft 08/22/22 for Left toes amp site   YADY's   Right 1.07      Left    1.1           Date: 08/15/22   Home Care Discontinued. Needs Nurse visit         Grafts Left Foot Amp Site  Puraply #1 Graft #1 4x4 fenestrated 06/15/22   Puraply #2 Graft #2 4x4 fenestrated 06/22/22                                    Orders for this week: 1/24/23     Left Posterior Neck cultured on 1/24/23. Left Posterior Neck - Wash with soap and water, pat dry. Lidocaine to wound base. Pack with Anasept gel damp Mesalt. Cover with silicone border. Change on Tuesdays and Fridays.     Left Plantar Foot wound -- Clean with soap and water, pat dry. Apply A&D  Wrap with Conform and Coban  Leave in place for 1 week. ((((HOME CARE - PLEASE DO NOT REMOVE THE LEFT LEG DRESSING))))        WOUND VAC THERAPY: Right Leg Amp Site  Ca alginate then DUODERM TO PERIWOUND FOR PROTECTION. PACK WITH ARIADNA then BLACK FOAM TO WOUND BED. SECURE VAC DRESSING WITH DRAPE. SET WOUND VAC  CONTINUOUS SUCTION. CANISTER CHANGE WEEKLY OR ACCORDING TO VOLUME OF DRAINAGE. Wrap with ACE wrap, but do not compress tubing against skin. WOUND CARE CENTER WILL CHANGE ON  and  (NEED CANISTER AND BLACK FOAM DRESSING KIT). Nurse visit on Follow Up Instructions: At the [address] in 1 week: Tuesday   Primary Wound Care Provider: Sima Barfield CNP   Call  for any questions or concerns. Central [phone]      Treatment Note Wound 10/20/22 #3 right amp site. -Dressing/Treatment:  (duoderm,anasept,ariadna,black foam,drape)  Wound 23 #1 Left Posterior Neck/Upper Shoulder-Dressing/Treatment:  (lidacaine, anasept gel, mesalt, border)    Written Patient Dismissal Instructions Given            Electronically signed by RAFAEL Blanchard - CNP on 2023 at 1:09 PM

## 2023-01-24 NOTE — DISCHARGE INSTRUCTIONS
PHYSICIAN ORDERS AND DISCHARGE INSTRUCTIONS     NOTE: Upon discharge from the [address], you will receive a patient experience survey. We would be grateful if you would take the time to fill this survey out. Wound cleansing:              Do not scrub or use excessive force. Wash hands with soap and water before and after dressing changes. Prior to applying a clean dressing, cleanse wound with normal saline, wound cleanser, or mild soap and water. Ask the physician or nurse before getting the wound(s) wet in a shower     Daily Wound management:              Keep weight off wounds and reposition every 2 hours. Avoid standing for long periods of time. Apply wraps/stockings in AM and remove at bedtime. If swelling is present, elevate legs to the level of the heart or above for 30 minutes 4-5 times a day and/or when sitting. When taking antibiotics take entire prescription as ordered by physician do not stop taking until medicine is all gone. Wound Care Notes:  Rx: Tyler Manning  Apply for grafts 05/11/22: aLL GRAFTS approved 06/08/22 for Left Toe amp site  Reapply for graft 08/22/22 for Left toes amp site   YADY's   Right 1.07      Left    1.1           Date: 08/15/22   Home Care Discontinued. Needs Nurse visit         Grafts Left Foot Amp Site  Puraply #1 Graft #1 4x4 fenestrated 06/15/22   Puraply #2 Graft #2 4x4 fenestrated 06/22/22                                    Orders for this week: 1/24/23     Left Posterior Neck cultured on 1/24/23. Left Posterior Neck - Wash with soap and water, pat dry. Lidocaine to wound base. Pack with Anasept gel damp Mesalt. Cover with silicone border. Change on Tuesdays and Fridays. Left Plantar Foot wound -- Clean with soap and water, pat dry. Apply A&D  Wrap with Conform and Coban  Leave in place for 1 week.   ((((HOME CARE - PLEASE DO NOT REMOVE THE LEFT LEG DRESSING))))        WOUND VAC THERAPY: Right Leg Amp Site  Ca alginate then DUODERM TO PERIWOUND FOR PROTECTION. PACK WITH AL then BLACK FOAM TO WOUND BED. SECURE VAC DRESSING WITH DRAPE. SET WOUND VAC  CONTINUOUS SUCTION. CANISTER CHANGE WEEKLY OR ACCORDING TO VOLUME OF DRAINAGE. Wrap with ACE wrap, but do not compress tubing against skin. WOUND CARE CENTER WILL CHANGE ON  and  (NEED CANISTER AND BLACK FOAM DRESSING KIT). Nurse visit on Follow Up Instructions: At the [address] in 1 week: Tuesday   Primary Wound Care Provider: Jamison Mcgill CNP   Call  for any questions or concerns.   Central [phone]

## 2023-01-24 NOTE — CARE COORDINATION
Phone call to Pt to follow up regarding housing, mental health services and transportation to medical appointments. Mental health services:  SW asked Pt if he has received the application for Mental Health services of [facility]. Pt reported he has received items in the mail, but he is not sure what they are as he has not yet opened them. Discussed with Pt that he will have to submit the application, then start calling at 8am each day, to obtain a spot for an initial assessment the same day. Pt stated he would give them a call to discuss his concerns regarding transportation. Transportation:  Pt reported he has not yet contacted [facility] to schedule rides, but plans to for his upcoming appointments. Housing:  Pt reports he would like to re-apply for AL once his wound vac is removed. KOLE plan of care:  SW will follow up with Pt in one week regarding transportation, mh services, and transportation.

## 2023-01-27 ENCOUNTER — TELEPHONE (OUTPATIENT)
Dept: SURGERY | Age: 43
End: 2023-01-27

## 2023-01-29 LAB
CULTURE: ABNORMAL
CULTURE: ABNORMAL
Lab: ABNORMAL
SPECIMEN: ABNORMAL

## 2023-01-31 ENCOUNTER — HOSPITAL ENCOUNTER (OUTPATIENT)
Dept: WOUND CARE | Age: 43
Discharge: HOME | End: 2023-01-31
Payer: MEDICARE

## 2023-01-31 ENCOUNTER — CARE COORDINATION (OUTPATIENT)
Dept: CARE COORDINATION | Age: 43
End: 2023-01-31

## 2023-01-31 VITALS
DIASTOLIC BLOOD PRESSURE: 89 MMHG | RESPIRATION RATE: 18 BRPM | HEART RATE: 88 BPM | TEMPERATURE: 98.3 F | SYSTOLIC BLOOD PRESSURE: 148 MMHG

## 2023-01-31 DIAGNOSIS — L97.522 DIABETIC ULCER OF TOE OF LEFT FOOT ASSOCIATED WITH TYPE 2 DIABETES MELLITUS, WITH FAT LAYER EXPOSED (HCC): ICD-10-CM

## 2023-01-31 DIAGNOSIS — L03.116 CELLULITIS OF LEFT FOOT: ICD-10-CM

## 2023-01-31 DIAGNOSIS — Z89.432 PARTIAL NONTRAUMATIC AMPUTATION OF FOOT, LEFT (HCC): ICD-10-CM

## 2023-01-31 DIAGNOSIS — E11.621 DIABETIC ULCER OF TOE OF LEFT FOOT ASSOCIATED WITH TYPE 2 DIABETES MELLITUS, WITH FAT LAYER EXPOSED (HCC): ICD-10-CM

## 2023-01-31 DIAGNOSIS — E11.621 DIABETIC ULCER OF LEFT MIDFOOT ASSOCIATED WITH TYPE 2 DIABETES MELLITUS, WITH FAT LAYER EXPOSED (HCC): Primary | ICD-10-CM

## 2023-01-31 DIAGNOSIS — L97.422 DIABETIC ULCER OF LEFT MIDFOOT ASSOCIATED WITH TYPE 2 DIABETES MELLITUS, WITH FAT LAYER EXPOSED (HCC): Primary | ICD-10-CM

## 2023-01-31 PROCEDURE — 11042 DBRDMT SUBQ TIS 1ST 20SQCM/<: CPT

## 2023-01-31 PROCEDURE — 11042 DBRDMT SUBQ TIS 1ST 20SQCM/<: CPT | Performed by: NURSE PRACTITIONER

## 2023-01-31 RX ORDER — BACITRACIN, NEOMYCIN, POLYMYXIN B 400; 3.5; 5 [USP'U]/G; MG/G; [USP'U]/G
OINTMENT TOPICAL ONCE
OUTPATIENT
Start: 2023-01-31 | End: 2023-01-31

## 2023-01-31 RX ORDER — LIDOCAINE HYDROCHLORIDE 20 MG/ML
JELLY TOPICAL ONCE
OUTPATIENT
Start: 2023-01-31 | End: 2023-01-31

## 2023-01-31 RX ORDER — CLOBETASOL PROPIONATE 0.5 MG/G
OINTMENT TOPICAL ONCE
OUTPATIENT
Start: 2023-01-31 | End: 2023-01-31

## 2023-01-31 RX ORDER — GENTAMICIN SULFATE 1 MG/G
OINTMENT TOPICAL ONCE
OUTPATIENT
Start: 2023-01-31 | End: 2023-01-31

## 2023-01-31 RX ORDER — BETAMETHASONE DIPROPIONATE 0.05 %
OINTMENT (GRAM) TOPICAL ONCE
OUTPATIENT
Start: 2023-01-31 | End: 2023-01-31

## 2023-01-31 RX ORDER — LIDOCAINE 50 MG/G
OINTMENT TOPICAL ONCE
OUTPATIENT
Start: 2023-01-31 | End: 2023-01-31

## 2023-01-31 RX ORDER — BACITRACIN ZINC AND POLYMYXIN B SULFATE 500; 1000 [USP'U]/G; [USP'U]/G
OINTMENT TOPICAL ONCE
OUTPATIENT
Start: 2023-01-31 | End: 2023-01-31

## 2023-01-31 RX ORDER — LIDOCAINE HYDROCHLORIDE 40 MG/ML
SOLUTION TOPICAL ONCE
OUTPATIENT
Start: 2023-01-31 | End: 2023-01-31

## 2023-01-31 RX ORDER — GINSENG 100 MG
CAPSULE ORAL ONCE
OUTPATIENT
Start: 2023-01-31 | End: 2023-01-31

## 2023-01-31 RX ORDER — LIDOCAINE 40 MG/G
CREAM TOPICAL ONCE
OUTPATIENT
Start: 2023-01-31 | End: 2023-01-31

## 2023-01-31 ASSESSMENT — PAIN SCALES - GENERAL: PAINLEVEL_OUTOF10: 0

## 2023-01-31 NOTE — PROGRESS NOTES
Wound Care Center Progress Note With Procedure    Corrinne Hau  AGE: 43 y.o. GENDER: male  : 1980  EPISODE DATE:  2023     Subjective:     Chief Complaint   Patient presents with    Wound Check     Right amp aite         HISTORY of PRESENT ILLNESS      Corrinne Hau is a 43 y.o. male who presents today for wound evaluation of Chronic diabetic, pressure and non-healing surgical ulcer(s) of the left medial foot and lateral plantar surface. The ulcer is of marked severity. The underlying cause of the wound is diabetes, non-healing surgical. He presents today with a new wound to the right plantar foot that is diabetic and pressure in nature and of moderate severity. The patient has significant underlying medical conditions as below. The patient is having significant depression related to the recent and sudden death of his mother. 23: Patient has an abscess left shoulder/neck area. The patient was seen by [address] [place] [address] [facility] for a possible spider bite was referred to surgical consult and was to start Keflex and Bactrim. He then went to the ED 23 with I&D abscess. Today the abscess has not been completely evacuated with repeat I&D as below. 10/20/22: The patient was here last 10/4/22 and his right BKA site was well approximated, staples removed and steri strips. The prosthetic supplier was with him and plans had started to get fitted. Unfortunately, the patient fell the same night at home and the wound dehisced. He was evaluated at the ED. The patient then was not seen at the wound clinic for a few weeks as he was hospitalized for suicidal ideation. Today able to probe and visibly see bone. Home Health still in place, will apply for a wound vac. Will also start Bactrim. 22: Since his last visit to the wound clinic 22 the patient was hospitalized -22) at [facility] with necrotizing fascitis right lower leg with resulting right BKA.  Arina amputation of the right lower limb below the knee by Dr. Carol Minor.  She placed a wound VAC to on the stump at that time.  On 9/5/2022 Dr. Minor performed a primary closure of his right BKA stump.    Wound Pain Timing/Severity: none  Quality of pain: N/A  Severity of pain:  0 / 10   Modifying Factors: diabetes and chronic pressure  Associated Signs/Symptoms: drainage     Diabetes: Yes, on an oral and insulin regimen, last A1c 10.5 as of 7/28/22  Diabetes education provided today:    Diabetes pathoetiology, difference between type 1 and type 2 diabetes, and progressive nature of Type 2 DM.  Diabetic Neuropathy: signs and therapy.  Foot care: advised to wash feet daily, pat dry and apply lotion at night, avoiding between toes. Need to look at feet daily and report to a physician any signs of inflammation or skin damage. Discussed diabetes shoes and socks.  Diabetic management related to wound care    Smoking: Never smoker  Obesity:No  Anticoagulant therapy: No  Immunosuppression: No    Patient educated on the 6 essential components necessary for wound healing: Circulation, Debridements, Proper Dressings and Topical Wound Products, Infection Control, Edema Control and Offloading.    Patient educated on those factors that negatively effect or impact wound healing: smoking, obesity, uncontrolled diabetes, anticoagulant and immunosuppressive regimens, inadequate nutrition, untreated arterial and venous disease if applicable and measures to manage edema.     Nutritional status: well nourished. Discussed need for increased protein and calories for wound healing and good sources of protein (just over 7 grams for every 20 pounds of body weight). Animal-based foods high in protein (meat, poultry, fish, eggs, and dairy foods). Plant based foods high in protein (tofu, lentils, beans, chickpeas, nuts, quinoa and den seeds.     Off Loading  Offloading or minimizing or removing weight placed on an area with poor  circulation such as diabetic wounds or pressure. This can be achieved with crutches, wheel chair, knee walker etc. Minimizing pressure through partial weight bearing (minimizing the amount of  pressure applied and or the amount of time on the area of pressure) or maintaining a non-weight bearing status can be used to promote and often can be essential for thee wound to heal. Off loading may also need to be achieved for non-weight bearing wounds such as pressure ulcers to the torso. Turning and changing positions frequently, at least every two hours. Use of pressure cushion if sitting up in chair. Skin Care  Keep skin clean and well moisturized , moisturize routinely with ointments for heavier moisturizer needs for extremely dry skin or cracks such as A&D ointment and lotions for a light moisturizer such as CeraVe or Eucerin. If incontinent change incontinence garments as soon as soiled and keeping skin clean and use barrier cream to protect the skin.         PAST MEDICAL HISTORY        Diagnosis Date    Abscess of left foot 4/22/2022    Anemia associated with acute blood loss 4/26/2022    Callus of foot     Right foot - see's Dr. Harper Cadet    Cellulitis of left foot 4/22/2022    Diabetic ulcer of left midfoot associated with type 2 diabetes mellitus, with muscle involvement without evidence of necrosis ([address] 4/26/2022    Diabetic ulcer of left midfoot associated with type 2 diabetes mellitus, with necrosis of muscle ([address] 6/7/2021    Diabetic ulcer of right midfoot associated with type 2 diabetes mellitus, with fat layer exposed ([address] 8/11/2020    Dizziness     positional    Essential hypertension     Follows with PCP    Hyperlipidemia     Lumbar radiculopathy     Septic embolism ([address] 6/13/2020    Subacute osteomyelitis of left foot ([address] 4/22/2022       PAST SURGICAL HISTORY    Past Surgical History:   Procedure Laterality Date    ACHILLES TENDON SURGERY Right 1/8/2021    RIGHT ACHILLES TENDON LENGTHENING REPAIR performed by Kaycee Rico DPM at [address]  03/2018    [facility]    DENTAL SURGERY      teeth extractions -half per patient    HERNIA REPAIR Bilateral 5/27/2020    [address] [place] HERNIA INGUINAL REPAIR performed by Manda Pritchett MD at [address] Right 9/2/2022    LEG AMPUTATION BELOW KNEE performed by Jenniffer Castañeda MD at [address] Right 9/5/2022    LEG AMPUTATION BELOW KNEE REVISION performed by Jenniffer Castañeda MD at [address]  2018    [place] OFFICE/OUTPT VISIT,PROCEDURE ONLY Left 4/17/2018    L5-S1 HEMILAMINECTOMY, REMOVAL OF [address] LEFT SIDE performed by Alvarado Alejandra MD at [facility] Right 1/8/2021    RIGHT TRANSMETATARSAL TOE AMPUTATION performed by Kaycee Rico DPM at [facility] OR    TOE AMPUTATION Left 4/23/2022    LEFT RAY GREAT TOE AMPUTATION [facility]    Family History   Problem Relation Age of Onset    Heart Disease Father     Diabetes Father     Diabetes Mother     Heart Disease Mother     Other Mother     Kidney Disease Mother     Heart Attack Mother        SOCIAL HISTORY    Social History     Tobacco Use    Smoking status: Never    Smokeless tobacco: Never   Vaping Use    Vaping Use: Never used   Substance Use Topics    Alcohol use: Yes     Comment: occasionally    Drug use: No       ALLERGIES    No Known Allergies    MEDICATIONS    Current Outpatient Medications on File Prior to Encounter   Medication Sig Dispense Refill    cephALEXin (KEFLEX) 500 MG capsule Take 1 capsule by mouth 3 times daily 30 capsule 0    prazosin (MINIPRESS) 1 MG capsule Take 1 capsule by mouth nightly 30 capsule 5    QUEtiapine (SEROQUEL) 200 MG tablet Take 1 tablet by mouth 2 times daily 60 tablet 5    carvedilol (COREG) 12.5 MG tablet Take 1 tablet by mouth 2 times daily (with meals) 60 tablet 0 losartan (COZAAR) 100 MG tablet Take 1 tablet by mouth daily 30 tablet 5    clopidogrel (PLAVIX) 75 MG tablet Take 1 tablet by mouth daily 30 tablet 5    atorvastatin (LIPITOR) 40 MG tablet Take 1 tablet by mouth nightly 30 tablet 5    ondansetron (ZOFRAN) 4 MG tablet Take 1 tablet by mouth daily as needed for Nausea or Vomiting 30 tablet 0    dulaglutide (TRULICITY) 1.5 VD/1.3HW SC injection Inject 0.5 mLs into the skin once a week 5 Adjustable Dose Pre-filled Pen Syringe 5    Blood Pressure KIT 1 kit by Does not apply route daily 1 kit 0    pantoprazole (PROTONIX) 40 MG tablet Take 1 tablet by mouth every morning (before breakfast) 30 tablet 5    aspirin 81 MG chewable tablet Take 1 tablet by mouth daily Take 81 mg by mouth daily 30 tablet 2    FLUoxetine (PROZAC) 40 MG capsule Take 2 capsules by mouth daily 30 capsule 0    hydrOXYzine pamoate (VISTARIL) 50 MG capsule Take 50 mg by mouth every 6 hours as needed for Anxiety      docusate sodium (COLACE, DULCOLAX) 100 MG CAPS Take 100 mg by mouth daily      [DISCONTINUED] metFORMIN (GLUCOPHAGE) 500 MG tablet Take 2 tablets by mouth 2 times daily (with meals) Indications: Start tomorrow 03/14 360 tablet 1    [DISCONTINUED] metoclopramide (REGLAN) 10 MG tablet Take 1 tablet by mouth 4 times daily (before meals and nightly) 120 tablet 3    Alcohol Swabs PADS       blood glucose monitor strips Test 2 times a day & as needed for symptoms of irregular blood glucose. Dispense sufficient amount for indicated testing frequency plus additional to accommodate PRN testing needs. 100 strip 0    [DISCONTINUED] pioglitazone (ACTOS) 30 MG tablet Take 1 tablet by mouth daily 90 tablet 1    FreeStyle Lancets MISC 1 each by Does not apply route daily 100 each 3    [DISCONTINUED] acetaminophen (TYLENOL) 325 MG tablet Take 2 tablets by mouth every 4 hours as needed for Pain or Fever 120 tablet 3     No current facility-administered medications on file prior to encounter.        REVIEW OF SYSTEMS    Pertinent items are noted in HPI. Constitutional: Negative for systemic symptoms including fever, chills and malaise. Objective:      BP (!) 148/89   Pulse 88   Temp 98.3 °F (36.8 °C) (Temporal)   Resp 18     PHYSICAL EXAM      General: The patient is in no acute distress. Mental status:  Patient is appropriate, is  oriented to place and plan of care. Dermatologic exam: Visual inspection of the periwound reveals the skin to be normal in turgor and texture  Wound exam: see wound description below in procedure note      Assessment:     Problem List Items Addressed This Visit          Endocrine    WD-Diabetic ulcer of left midfoot associated with type 2 diabetes mellitus, with fat layer exposed ([address] - Primary    Relevant Orders    Initiate Outpatient Wound Care Protocol    Diabetic ulcer of toe of left foot associated with type 2 diabetes mellitus, with fat layer exposed ([address]    Relevant Orders    Initiate Outpatient Wound Care Protocol       Other    Cellulitis of left foot    Relevant Orders    Initiate Outpatient Wound Care Protocol    WD-Partial nontraumatic amputation of foot, left [facility])    Relevant Orders    Initiate Outpatient Wound Care Protocol     Procedure Note    Indications:  Based on my examination of this patient's wound(s) today, sharp excision into necrotic subcutaneous tissue is required to promote healing and evaluate the extent of previous healing.     Performed by: RAFAEL Wynn CNP    Consent obtained: Yes    Time out taken:  Yes    Pain Control: Anesthetic  Anesthetic:       Debridement:Excisional Debridement    Using curette the wound(s) was/were sharply debrided down through and including the removal of subcutaneous      Devitalized Tissue Debrided:  fibrin, biofilm, slough, necrotic/eschar, and exudate    Pre Debridement Measurements:  Are located in the Wound Documentation Flow Sheet    All active wounds listed below with today's date are evaluated  Wound(s)    debrided this date include # : 3    Post  Debridement Measurements:  Negative Pressure Wound Therapy Leg Anterior; Lower;Proximal;Right (Active)   Unit Type KCI 01/31/23 0910   Dressing Type Black Foam 01/31/23 0910   Number of pieces used 1 01/24/23 1038   Number of pieces removed 1 01/31/23 0910   Cycle Continuous 01/24/23 1038   Target Pressure (mmHg) 125 01/24/23 1038   Intensity 5 01/24/23 1038   Canister changed? Yes 01/31/23 0910   Dressing Status New dressing applied 01/24/23 1038   Dressing Changed Changed/New 01/24/23 1038   Drainage Amount Moderate 01/24/23 1038   Drainage Description Sanguinous 01/24/23 1038   Dressing Change Due 12/06/22 12/20/22 0938   Wound Assessment Granulation tissue 01/24/23 1038   Rosalie-wound Assessment Intact;Fragile 01/24/23 1038   Odor None 01/24/23 1038   Number of days: 97       Wound 10/20/22 #3 right amp site.  (Active)   Wound Image   01/24/23 0946   Wound Etiology Non-Healing Surgical 01/31/23 0935   Dressing Status New dressing applied 01/31/23 0935   Wound Cleansed Soap and water 01/31/23 0910   Offloading for Diabetic Foot Ulcers Other (comment) 01/31/23 0935   Wound Length (cm) 1.9 cm 01/31/23 0910   Wound Width (cm) 1.7 cm 01/31/23 0910   Wound Depth (cm) 0.3 cm 01/31/23 0910   Wound Surface Area (cm^2) 3.23 cm^2 01/31/23 0910   Change in Wound Size % (l*w) 42.32 01/31/23 0910   Wound Volume (cm^3) 0.969 cm^3 01/31/23 0910   Wound Healing % 57 01/31/23 0910   Post-Procedure Length (cm) 1.9 cm 01/31/23 0930   Post-Procedure Width (cm) 1.7 cm 01/31/23 0930   Post-Procedure Depth (cm) 0.3 cm 01/31/23 0930   Post-Procedure Surface Area (cm^2) 3.23 cm^2 01/31/23 0930   Post-Procedure Volume (cm^3) 0.969 cm^3 01/31/23 0930   Distance Tunneling (cm) 0 cm 01/31/23 0910   Tunneling Position ___ O'Clock 0 01/31/23 0910   Undermining Starts ___ O'Clock 0 01/31/23 0910   Undermining Ends___ O'Clock 0 01/31/23 0910   Undermining Maxium Distance (cm) 0 01/31/23 0910   Wound Assessment Pink/red;Slough; Exposed structure bone 01/31/23 0910   Drainage Amount Small 01/31/23 0910   Drainage Description Serosanguinous; Yellow 01/31/23 0910   Odor None 01/31/23 0910   Rosalie-wound Assessment Edematous; Blanchable erythema; Excoriated; Maceration 01/31/23 0910   Margins Undefined edges 01/31/23 0910   Wound Thickness Description not for Pressure Injury Full thickness 01/31/23 0910   Number of days: 103       Wound 01/24/23 #1 Left Posterior Neck/Upper Shoulder (Active)   Wound Image   01/24/23 0949   Wound Etiology Other 01/31/23 0935   Dressing Status New dressing applied 01/31/23 0935   Wound Cleansed Wound cleanser 01/31/23 0910   Offloading for Diabetic Foot Ulcers Offloading not required 01/31/23 0935   Wound Length (cm) 0.5 cm 01/31/23 0910   Wound Width (cm) 0.8 cm 01/31/23 0910   Wound Depth (cm) 0.1 cm 01/31/23 0910   Wound Surface Area (cm^2) 0.4 cm^2 01/31/23 0910   Change in Wound Size % (l*w) 50 01/31/23 0910   Wound Volume (cm^3) 0.04 cm^3 01/31/23 0910   Wound Healing % 50 01/31/23 0910   Post-Procedure Length (cm) 0.5 cm 01/31/23 0930   Post-Procedure Width (cm) 0.8 cm 01/31/23 0930   Post-Procedure Depth (cm) 0.1 cm 01/31/23 0930   Post-Procedure Surface Area (cm^2) 0.4 cm^2 01/31/23 0930   Post-Procedure Volume (cm^3) 0.04 cm^3 01/31/23 0930   Distance Tunneling (cm) 0 cm 01/31/23 0910   Tunneling Position ___ O'Clock 0 01/31/23 0910   Undermining Starts ___ O'Clock 0 01/31/23 0910   Undermining Ends___ O'Clock 0 01/31/23 0910   Undermining Maxium Distance (cm) 0 01/31/23 0910   Wound Assessment Dry;Devitalized tissue 01/31/23 0910   Drainage Amount Large 01/31/23 0910   Drainage Description Purulent;Yellow; Thick 01/31/23 0910   Odor None 01/31/23 0910   Rosalie-wound Assessment Edematous; Blanchable erythema 01/31/23 0910   Margins Defined edges 01/31/23 0910   Wound Thickness Description not for Pressure Injury Full thickness 01/31/23 0910   Number of days: 7 Total  Area  Debrided: 3.63 sq cm     Bleeding:  Minimal    Hemostasis Achieved:  by pressure    Procedural Pain:  0  / 10     Post Procedural Pain:  0 / 10     Response to treatment:  Well tolerated by patient. Status of wound progress and description from last visit: BKA site improving, regimen as below, follow up in one week. Abscess left shoulder/neck with I&D last week, regimen as below. The patient continues to orellana depression post his mother's death. Plan:       Discharge Instructions         PHYSICIAN ORDERS AND DISCHARGE INSTRUCTIONS     NOTE: Upon discharge from the [address], you will receive a patient experience survey. We would be grateful if you would take the time to fill this survey out. Wound cleansing:              Do not scrub or use excessive force. Wash hands with soap and water before and after dressing changes. Prior to applying a clean dressing, cleanse wound with normal saline, wound cleanser, or mild soap and water. Ask the physician or nurse before getting the wound(s) wet in a shower     Daily Wound management:              Keep weight off wounds and reposition every 2 hours. Avoid standing for long periods of time. Apply wraps/stockings in AM and remove at bedtime. If swelling is present, elevate legs to the level of the heart or above for 30 minutes 4-5 times a day and/or when sitting. When taking antibiotics take entire prescription as ordered by physician do not stop taking until medicine is all gone. Wound Care Notes:  Rx: Brianna Vazqueze  Apply for grafts 05/11/22: aLL GRAFTS approved 06/08/22 for Left Toe amp site  Reapply for graft 08/22/22 for Left toes amp site   YADY's   Right 1.07      Left    1.1           Date: 08/15/22   Home Care Discontinued.  Needs Nurse visit         Grafts Left Foot Amp Site  Puraply #1 Graft #1 4x4 fenestrated 06/15/22   Puraply #2 Graft #2 4x4 fenestrated 22                                    Orders for this week: 23     Left Posterior Neck cultured on 23. Left Posterior Neck - Wash with soap and water, pat dry. Lidocaine to wound base. Pack with Anasept gel damp Mesalt. Cover with silicone border. Change on  and . Left Plantar Foot wound -- Clean with soap and water, pat dry. Apply A&D  Wrap with Conform and Coban  Leave in place for 1 week. ((((PLEASE DO NOT REMOVE THE LEFT LEG DRESSING))))        WOUND VAC THERAPY: Right Leg Amp Site wounds (hold vac today in clinic 23, but plan to reapply on Friday 2/3/23)    Today in clinic - Apply Anasept gel and Al to wound bed. Also apply Al to excoriation of periwound. Cover with ABD and secure with ACE. On Friday 2/3/23:  Ca alginate then DUODERM TO PERIWOUND FOR PROTECTION. PACK WITH AL then BLACK FOAM TO WOUND BED. SECURE VAC DRESSING WITH DRAPE. SET WOUND VAC  CONTINUOUS SUCTION. CANISTER CHANGE WEEKLY OR ACCORDING TO VOLUME OF DRAINAGE. Wrap with ACE wrap, but do not compress tubing against skin. WOUND CARE CENTER WILL CHANGE ON  and  (NEED CANISTER AND BLACK FOAM DRESSING KIT). Nurse visit on   Follow Up Instructions: At the [address] [facility] [address] in 1 week: Tuesday   Primary Wound Care Provider: Feliz Haile CNP   Call  for any questions or concerns. Central [phone]        Treatment Note Wound 10/20/22 #3 right amp site. -Dressing/Treatment:  (anasept gel,al,,abd,ace wrap)  Wound 23 #1 Left Posterior Neck/Upper Shoulder-Dressing/Treatment:  (lidocaine,anasept damp mesalt,silicone border)    Written Patient Dismissal Instructions Given            Electronically signed by RAFAEL Sandoval CNP on 2023 at 10:13 AM

## 2023-01-31 NOTE — DISCHARGE INSTRUCTIONS
PHYSICIAN ORDERS AND DISCHARGE INSTRUCTIONS     NOTE: Upon discharge from the [address], you will receive a patient experience survey. We would be grateful if you would take the time to fill this survey out. Wound cleansing:              Do not scrub or use excessive force. Wash hands with soap and water before and after dressing changes. Prior to applying a clean dressing, cleanse wound with normal saline, wound cleanser, or mild soap and water. Ask the physician or nurse before getting the wound(s) wet in a shower     Daily Wound management:              Keep weight off wounds and reposition every 2 hours. Avoid standing for long periods of time. Apply wraps/stockings in AM and remove at bedtime. If swelling is present, elevate legs to the level of the heart or above for 30 minutes 4-5 times a day and/or when sitting. When taking antibiotics take entire prescription as ordered by physician do not stop taking until medicine is all gone. Wound Care Notes:  Rx: Moses Bella  Apply for grafts 05/11/22: aLL GRAFTS approved 06/08/22 for Left Toe amp site  Reapply for graft 08/22/22 for Left toes amp site   YADY's   Right 1.07      Left    1.1           Date: 08/15/22   Home Care Discontinued. Needs Nurse visit         Grafts Left Foot Amp Site  Puraply #1 Graft #1 4x4 fenestrated 06/15/22   Puraply #2 Graft #2 4x4 fenestrated 06/22/22                                    Orders for this week: 1/31/23     Left Posterior Neck cultured on 1/24/23. Left Posterior Neck - Wash with soap and water, pat dry. Lidocaine to wound base. Pack with Anasept gel damp Mesalt. Cover with silicone border. Change on Tuesdays and Fridays. Left Plantar Foot wound -- Clean with soap and water, pat dry. Apply A&D  Wrap with Conform and Coban  Leave in place for 1 week.   ((((PLEASE DO NOT REMOVE THE LEFT LEG DRESSING))))        WOUND VAC THERAPY: Right Leg Amp Site wounds (hold vac today in clinic 23, but plan to reapply on Friday 2/3/23)    Today in clinic - Apply Anasept gel and Shwetha to wound bed. Also apply Shwetha to excoriation of periwound. Cover with ABD and secure with ACE. On Friday 2/3/23:  Ca alginate then DUODERM TO PERIWOUND FOR PROTECTION. PACK WITH SHWETHA then BLACK FOAM TO WOUND BED. SECURE VAC DRESSING WITH DRAPE. SET WOUND VAC  CONTINUOUS SUCTION. CANISTER CHANGE WEEKLY OR ACCORDING TO VOLUME OF DRAINAGE. Wrap with ACE wrap, but do not compress tubing against skin. WOUND CARE CENTER WILL CHANGE ON  and  (NEED CANISTER AND BLACK FOAM DRESSING KIT). Nurse visit on   Follow Up Instructions: At the [address] [facility] [address] in 1 week: Tuesday   Primary Wound Care Provider: Lisa Elizabeth CNP   Call  for any questions or concerns.   Central [phone]

## 2023-01-31 NOTE — CARE COORDINATION
Phone call to Pt to follow up regarding housing. Pt reported he has the woundvac off for this week, but need to have it back on. Pt reports he wants to reapply for [place] waiver once the woundvac is off permanently. Transportation - could use [employer], but would have to be able to hold whatever he purchases. Discussed using this service for dr appointments in the future. Pt reported he has a ride to get groceries this week, stating he tries to get enough for 2 weeks or more due to having to find a ride each time he needs to go to the store. KOLE plan of care:  SW will follow up with Pt on 2/7 regarding housing.

## 2023-02-03 ENCOUNTER — HOSPITAL ENCOUNTER (OUTPATIENT)
Dept: WOUND CARE | Age: 43
Discharge: HOME | End: 2023-02-03
Payer: MEDICARE

## 2023-02-03 VITALS
HEART RATE: 89 BPM | DIASTOLIC BLOOD PRESSURE: 88 MMHG | SYSTOLIC BLOOD PRESSURE: 133 MMHG | TEMPERATURE: 97 F | RESPIRATION RATE: 16 BRPM

## 2023-02-03 PROCEDURE — 99212 OFFICE O/P EST SF 10 MIN: CPT

## 2023-02-03 PROCEDURE — 97607 NEG PRS WND THR NDME<=50SQCM: CPT

## 2023-02-03 RX ORDER — DOXYCYCLINE HYCLATE 100 MG
100 TABLET ORAL 2 TIMES DAILY
Qty: 20 TABLET | Refills: 0 | Status: SHIPPED | OUTPATIENT
Start: 2023-02-03 | End: 2023-02-13

## 2023-02-03 ASSESSMENT — PAIN - FUNCTIONAL ASSESSMENT: PAIN_FUNCTIONAL_ASSESSMENT: ACTIVITIES ARE NOT PREVENTED

## 2023-02-03 ASSESSMENT — PAIN DESCRIPTION - ONSET: ONSET: ON-GOING

## 2023-02-03 ASSESSMENT — PAIN SCALES - GENERAL: PAINLEVEL_OUTOF10: 0

## 2023-02-03 ASSESSMENT — PAIN DESCRIPTION - PAIN TYPE: TYPE: CHRONIC PAIN

## 2023-02-03 ASSESSMENT — PAIN DESCRIPTION - FREQUENCY: FREQUENCY: INTERMITTENT

## 2023-02-03 ASSESSMENT — PAIN DESCRIPTION - ORIENTATION: ORIENTATION: RIGHT

## 2023-02-03 ASSESSMENT — PAIN DESCRIPTION - LOCATION: LOCATION: LEG;KNEE

## 2023-02-03 NOTE — PROGRESS NOTES
Negative Pressure Wound Therapy    NAME:  Noni Burkitt OF BIRTH:  1980  MEDICAL RECORD NUMBER:  [id number]  DATE:  2/3/2023    Applied Negative Pressure to Right BKA wound(s)/ulcer(s). [x] Applied skin barrier prep to serenity-wound. [x] Cut strips of plastic drape to picture frame wound so that serenity-wound is     covered with the drape. [x] If bridging dressing to less prominent site, cover any intact skin that will come in contact with the Negative Pressure Therapy sponge, gauze or channel drain with plastic drape. The sponge should never touch intact skin. [x] Cut sponge, gauze or channel drain to size which will fit into the wound/ulcer bed without being forced. [x] Be sure the sponge is large enough to hold the entire round plastic flange which is attached to the tubing. Never allow flange to be larger than the sponge or it will produce suction damaging intact skin. Total number of individual pieces of foam used within the wound bed: 1    [x] If bridging the dressing away from the primary site, be sure the bridge leads to a piece of sponge large enough to hold the entire flange without allowing any of the flange to overlap onto intact skin. [x] Covered sponge, gauze or channel drain with plastic drape. [x] Cut a hole in this plastic drape directly over the sponge the same size as the plastic drain tubing. [x] Removed plastic liner from flange and apply it directly over the hole you cut. [x] Removed the plastic cover from the flange. [x] Attached the tubing to the wound/ulcer Negative Pressure Therapy and turn it on to be sure a vacuum is created and that there are no leaks. [x] If air leaks occur, use plastic drape to patch them. [x] Secured Negative Pressure Therapy dressing with ace wrap loosely if located on an extremity. Maintain tubing outside of ace wrap. Tubing must not exert pressure on intact skin.     Applied per  Guidelines      Electronically signed by Nilay Choudhary LPN on 8/4/3703 at 0:95 AM

## 2023-02-04 DIAGNOSIS — F33.2 SEVERE EPISODE OF RECURRENT MAJOR DEPRESSIVE DISORDER, WITHOUT PSYCHOTIC FEATURES (HCC): ICD-10-CM

## 2023-02-06 RX ORDER — FLUOXETINE HYDROCHLORIDE 40 MG/1
CAPSULE ORAL
Qty: 30 CAPSULE | Refills: 0 | Status: SHIPPED | OUTPATIENT
Start: 2023-02-06 | End: 2023-02-07 | Stop reason: SDUPTHER

## 2023-02-07 ENCOUNTER — HOSPITAL ENCOUNTER (OUTPATIENT)
Dept: WOUND CARE | Age: 43
Discharge: HOME | End: 2023-02-07
Payer: MEDICARE

## 2023-02-07 ENCOUNTER — CARE COORDINATION (OUTPATIENT)
Dept: CARE COORDINATION | Age: 43
End: 2023-02-07

## 2023-02-07 ENCOUNTER — OFFICE VISIT (OUTPATIENT)
Dept: FAMILY MEDICINE CLINIC | Age: 43
End: 2023-02-07
Payer: MEDICARE

## 2023-02-07 VITALS — SYSTOLIC BLOOD PRESSURE: 138 MMHG | HEART RATE: 91 BPM | OXYGEN SATURATION: 98 % | DIASTOLIC BLOOD PRESSURE: 90 MMHG

## 2023-02-07 VITALS
RESPIRATION RATE: 16 BRPM | SYSTOLIC BLOOD PRESSURE: 144 MMHG | HEART RATE: 89 BPM | TEMPERATURE: 98.2 F | DIASTOLIC BLOOD PRESSURE: 88 MMHG

## 2023-02-07 DIAGNOSIS — Z89.432 PARTIAL NONTRAUMATIC AMPUTATION OF FOOT, LEFT (HCC): ICD-10-CM

## 2023-02-07 DIAGNOSIS — E11.621 DIABETIC ULCER OF LEFT MIDFOOT ASSOCIATED WITH TYPE 2 DIABETES MELLITUS, WITH FAT LAYER EXPOSED (HCC): Primary | ICD-10-CM

## 2023-02-07 DIAGNOSIS — E11.40 TYPE 2 DIABETES MELLITUS WITH DIABETIC NEUROPATHY, WITHOUT LONG-TERM CURRENT USE OF INSULIN (HCC): Primary | ICD-10-CM

## 2023-02-07 DIAGNOSIS — L97.422 DIABETIC ULCER OF LEFT MIDFOOT ASSOCIATED WITH TYPE 2 DIABETES MELLITUS, WITH FAT LAYER EXPOSED (HCC): Primary | ICD-10-CM

## 2023-02-07 DIAGNOSIS — F33.2 SEVERE EPISODE OF RECURRENT MAJOR DEPRESSIVE DISORDER, WITHOUT PSYCHOTIC FEATURES (HCC): ICD-10-CM

## 2023-02-07 DIAGNOSIS — E11.621 DIABETIC ULCER OF TOE OF LEFT FOOT ASSOCIATED WITH TYPE 2 DIABETES MELLITUS, WITH FAT LAYER EXPOSED (HCC): ICD-10-CM

## 2023-02-07 DIAGNOSIS — I10 ESSENTIAL HYPERTENSION: ICD-10-CM

## 2023-02-07 DIAGNOSIS — L03.116 CELLULITIS OF LEFT FOOT: ICD-10-CM

## 2023-02-07 DIAGNOSIS — G47.00 INSOMNIA, UNSPECIFIED TYPE: ICD-10-CM

## 2023-02-07 DIAGNOSIS — L97.522 DIABETIC ULCER OF TOE OF LEFT FOOT ASSOCIATED WITH TYPE 2 DIABETES MELLITUS, WITH FAT LAYER EXPOSED (HCC): ICD-10-CM

## 2023-02-07 PROBLEM — E78.5 HLD (HYPERLIPIDEMIA): Status: ACTIVE | Noted: 2018-02-27

## 2023-02-07 PROBLEM — M86.9 OSTEOMYELITIS (HCC): Status: ACTIVE | Noted: 2021-06-12

## 2023-02-07 PROBLEM — I95.1 ORTHOSTATIC HYPOTENSION: Status: ACTIVE | Noted: 2018-06-26

## 2023-02-07 PROCEDURE — 3079F DIAST BP 80-89 MM HG: CPT

## 2023-02-07 PROCEDURE — 3051F HG A1C>EQUAL 7.0%<8.0%: CPT

## 2023-02-07 PROCEDURE — 1036F TOBACCO NON-USER: CPT

## 2023-02-07 PROCEDURE — G8427 DOCREV CUR MEDS BY ELIG CLIN: HCPCS

## 2023-02-07 PROCEDURE — 2022F DILAT RTA XM EVC RTNOPTHY: CPT

## 2023-02-07 PROCEDURE — 11042 DBRDMT SUBQ TIS 1ST 20SQCM/<: CPT | Performed by: NURSE PRACTITIONER

## 2023-02-07 PROCEDURE — 11042 DBRDMT SUBQ TIS 1ST 20SQCM/<: CPT

## 2023-02-07 PROCEDURE — G8484 FLU IMMUNIZE NO ADMIN: HCPCS

## 2023-02-07 PROCEDURE — 99214 OFFICE O/P EST MOD 30 MIN: CPT

## 2023-02-07 PROCEDURE — G8420 CALC BMI NORM PARAMETERS: HCPCS

## 2023-02-07 PROCEDURE — 3075F SYST BP GE 130 - 139MM HG: CPT

## 2023-02-07 RX ORDER — LIDOCAINE HYDROCHLORIDE 40 MG/ML
SOLUTION TOPICAL ONCE
OUTPATIENT
Start: 2023-02-07 | End: 2023-02-07

## 2023-02-07 RX ORDER — GINSENG 100 MG
CAPSULE ORAL ONCE
OUTPATIENT
Start: 2023-02-07 | End: 2023-02-07

## 2023-02-07 RX ORDER — LIDOCAINE 50 MG/G
OINTMENT TOPICAL ONCE
OUTPATIENT
Start: 2023-02-07 | End: 2023-02-07

## 2023-02-07 RX ORDER — BACITRACIN, NEOMYCIN, POLYMYXIN B 400; 3.5; 5 [USP'U]/G; MG/G; [USP'U]/G
OINTMENT TOPICAL ONCE
OUTPATIENT
Start: 2023-02-07 | End: 2023-02-07

## 2023-02-07 RX ORDER — BACITRACIN ZINC AND POLYMYXIN B SULFATE 500; 1000 [USP'U]/G; [USP'U]/G
OINTMENT TOPICAL ONCE
OUTPATIENT
Start: 2023-02-07 | End: 2023-02-07

## 2023-02-07 RX ORDER — LIDOCAINE HYDROCHLORIDE 20 MG/ML
JELLY TOPICAL ONCE
OUTPATIENT
Start: 2023-02-07 | End: 2023-02-07

## 2023-02-07 RX ORDER — FLUOXETINE HYDROCHLORIDE 40 MG/1
CAPSULE ORAL
Qty: 180 CAPSULE | Refills: 1 | Status: SHIPPED | OUTPATIENT
Start: 2023-02-07

## 2023-02-07 RX ORDER — CARVEDILOL 12.5 MG/1
12.5 TABLET ORAL 2 TIMES DAILY WITH MEALS
Qty: 180 TABLET | Refills: 1 | Status: SHIPPED | OUTPATIENT
Start: 2023-02-07

## 2023-02-07 RX ORDER — BETAMETHASONE DIPROPIONATE 0.05 %
OINTMENT (GRAM) TOPICAL ONCE
OUTPATIENT
Start: 2023-02-07 | End: 2023-02-07

## 2023-02-07 RX ORDER — LIDOCAINE 40 MG/G
CREAM TOPICAL ONCE
OUTPATIENT
Start: 2023-02-07 | End: 2023-02-07

## 2023-02-07 RX ORDER — PEN NEEDLE, DIABETIC 31 G X1/4"
1 NEEDLE, DISPOSABLE MISCELLANEOUS DAILY
Qty: 100 EACH | Refills: 3 | Status: SHIPPED | OUTPATIENT
Start: 2023-02-07

## 2023-02-07 RX ORDER — CLOBETASOL PROPIONATE 0.5 MG/G
OINTMENT TOPICAL ONCE
OUTPATIENT
Start: 2023-02-07 | End: 2023-02-07

## 2023-02-07 RX ORDER — INSULIN GLARGINE 100 [IU]/ML
10 INJECTION, SOLUTION SUBCUTANEOUS NIGHTLY
Qty: 5 ADJUSTABLE DOSE PRE-FILLED PEN SYRINGE | Refills: 3 | Status: SHIPPED | OUTPATIENT
Start: 2023-02-07

## 2023-02-07 RX ORDER — GENTAMICIN SULFATE 1 MG/G
OINTMENT TOPICAL ONCE
OUTPATIENT
Start: 2023-02-07 | End: 2023-02-07

## 2023-02-07 RX ORDER — AMLODIPINE BESYLATE 2.5 MG/1
2.5 TABLET ORAL DAILY
Qty: 30 TABLET | Refills: 5 | Status: SHIPPED | OUTPATIENT
Start: 2023-02-07

## 2023-02-07 RX ORDER — METFORMIN HYDROCHLORIDE 500 MG/1
1000 TABLET, EXTENDED RELEASE ORAL 2 TIMES DAILY WITH MEALS
Qty: 120 TABLET | Refills: 3 | Status: SHIPPED | OUTPATIENT
Start: 2023-02-07

## 2023-02-07 ASSESSMENT — SOCIAL DETERMINANTS OF HEALTH (SDOH)
WITHIN THE LAST YEAR, HAVE YOU BEEN AFRAID OF YOUR PARTNER OR EX-PARTNER?: NO
WITHIN THE LAST YEAR, HAVE TO BEEN RAPED OR FORCED TO HAVE ANY KIND OF SEXUAL ACTIVITY BY YOUR PARTNER OR EX-PARTNER?: NO
WITHIN THE LAST YEAR, HAVE YOU BEEN KICKED, HIT, SLAPPED, OR OTHERWISE PHYSICALLY HURT BY YOUR PARTNER OR EX-PARTNER?: NO
WITHIN THE LAST YEAR, HAVE YOU BEEN HUMILIATED OR EMOTIONALLY ABUSED IN OTHER WAYS BY YOUR PARTNER OR EX-PARTNER?: NO

## 2023-02-07 ASSESSMENT — ENCOUNTER SYMPTOMS
VOMITING: 0
ABDOMINAL PAIN: 0
CONSTIPATION: 0
COUGH: 0
SHORTNESS OF BREATH: 0
BLOOD IN STOOL: 0
DIARRHEA: 0
NAUSEA: 0

## 2023-02-07 NOTE — PROGRESS NOTES
Per order, left plantar foot as preventative A&D ointment, conform, coban, tape.   Leave on for 1 week

## 2023-02-07 NOTE — PROGRESS NOTES
Multilayer Compression Wrap   (Not Unna) Below the Knee    NAME:  Steve Gabriel OF BIRTH:  1980  MEDICAL RECORD NUMBER:  [id number]  DATE:  2/7/2023    Multilayer compression wrap: Removed old Multilayer wrap if indicated and wash leg with mild soap/water. Applied moisturizing agent to dry skin as needed. Applied primary and secondary dressing as ordered. Applied multilayered dressing below the knee to right lower leg. Instructed patient/caregiver not to remove dressing and to keep it clean and dry. Instructed patient/caregiver on complications to report to provider, such as pain, numbness in toes, heavy drainage, and slippage of dressing. Instructed patient on purpose of compression dressing and on activity and exercise recommendations.       Electronically signed by Jeremias Morataya RN on 2/7/2023 at 2:19 PM

## 2023-02-07 NOTE — DISCHARGE INSTRUCTIONS
PHYSICIAN ORDERS AND DISCHARGE INSTRUCTIONS     NOTE: Upon discharge from the [address], you will receive a patient experience survey. We would be grateful if you would take the time to fill this survey out. Wound cleansing:              Do not scrub or use excessive force. Wash hands with soap and water before and after dressing changes. Prior to applying a clean dressing, cleanse wound with normal saline, wound cleanser, or mild soap and water. Ask the physician or nurse before getting the wound(s) wet in a shower     Daily Wound management:              Keep weight off wounds and reposition every 2 hours. Avoid standing for long periods of time. Apply wraps/stockings in AM and remove at bedtime. If swelling is present, elevate legs to the level of the heart or above for 30 minutes 4-5 times a day and/or when sitting. When taking antibiotics take entire prescription as ordered by physician do not stop taking until medicine is all gone. Wound Care Notes:  Rx: Romario Romero  Apply for grafts 05/11/22: aLL GRAFTS approved 06/08/22 for Left Toe amp site  Reapply for graft 08/22/22 for Left toes amp site   YADY's   Right 1.07      Left    1.1           Date: 08/15/22   Home Care Discontinued. Needs Nurse visit         Grafts Left Foot Amp Site  Puraply #1 Graft #1 4x4 fenestrated 06/15/22   Puraply #2 Graft #2 4x4 fenestrated 06/22/22                                    Orders for this week: 2/7/23      Left Plantar Foot wound -- Clean with soap and water, pat dry. Apply A&D  Wrap with Conform and Coban  Leave in place for 1 week. ((((PLEASE DO NOT REMOVE THE LEFT LEG DRESSING))))        WOUND VAC THERAPY: Right Leg Amp Site wounds (hold vac today in clinic 2/7/23)     Today in clinic -  Right amp site Apply Anasept gel and Shwetha to wound bed.   Cover with Calcium alginate,   Secure with coban 2 lite, box in stump and go up to above knee. Leave in place for 1 week      [On Tuesday 2/7/2023 wound vac on hold  Ca alginate then DUODERM TO PERIWOUND FOR PROTECTION. PACK WITH AL then BLACK FOAM TO WOUND BED. SECURE VAC DRESSING WITH DRAPE. SET WOUND VAC  CONTINUOUS SUCTION. CANISTER CHANGE WEEKLY OR ACCORDING TO VOLUME OF DRAINAGE. Wrap with ACE wrap, but do not compress tubing against skin. WOUND CARE CENTER WILL CHANGE ON TUESDAYS and FRIDAYS (NEED CANISTER AND BLACK FOAM DRESSING KIT). ]        Nurse visit on Fridays  Follow Up Instructions: At the [address] [address] in 1 week: Tuesday   Primary Wound Care Provider: Noel Covington CNP   Call  for any questions or concerns.   Central Scheduling: [phone]

## 2023-02-07 NOTE — CARE COORDINATION
Phone call to Pt to follow up regarding AL placement. Pt reported they left the woundvac off again and will reassess next week. Pt reported if the woundvac remains off after next week, he would like to be referred back to AL waiver. Mental health services:  Pt reported he believes he is doing well, has not had any recent suicidal ideations and feels more positive. Pt reported he discussed with PCP today that he believes he had \"everything happening all at once\" which led to those behaviors. Pt reports he continues to wish to leave his house as it is too much to care for. Transportation:  KOLE discussed with pt that to utilize the dial a ride transportation through [address] he would need to complete an application and have a Dr sign. KOLE offered to send application via postal mail. Pt was in agreement. KOLE plan of care:  KOLE will follow up with Pt in one week regarding woundvac and will make referral to AL waiver if woundvac remains off. KOLE will confirm Pt has received application for transportation through [facility].

## 2023-02-07 NOTE — PROGRESS NOTES
2/7/2023    Duvall Austinsofías    Chief Complaint   Patient presents with    1 Month Follow-Up     - elevated blood sugar. 2/7/23 fbs 300. Pt states he restarted metformin       HPI  History was obtained from patient. Anais Hodges is a pleasant 43 y.o. male who presents today for 4 week follow up. DM2: Last J3L 7.6- kept Trulicity and discontinued all other antidiabetic medications. Patient reports that he had fasting blood glucose levels near 300 recently. He is returning to have the wound vac placed again on his RLE to heal one small area that remains open. He is hoping that once the wound vac is off for good that he will be able to get into assisted living. He is planning on follow up with the [facility] for future prosthesis. HTN: Compliant with medications, elevated today. Depression: Patient feels like his mood has improved overall- he states that he does not feel as depressed as he once did. Feels like this was because everything happened all at once for him which was overwhelming. States that the current dose of fluoxetine is where it needs to be. Insomnia: He is sleeping well- denies vivid dreams. Is compliant with Prazosin. 1. Type 2 diabetes mellitus with diabetic neuropathy, without long-term current use of insulin ([address]    2. Essential hypertension    3. Severe episode of recurrent major depressive disorder, without psychotic features ([address]    4. Insomnia, unspecified type             REVIEW OF SYMPTOMS    Review of Systems   Constitutional:  Negative for chills, diaphoresis, fatigue and fever. Respiratory:  Negative for cough and shortness of breath. Cardiovascular:  Negative for chest pain and palpitations. Gastrointestinal:  Negative for abdominal pain, blood in stool, constipation, diarrhea, nausea and vomiting. Musculoskeletal:  Positive for gait problem (RLE BKA amputation). Psychiatric/Behavioral:  Negative for dysphoric mood and sleep disturbance.  The patient is not nervous/anxious. PAST MEDICAL HISTORY  Past Medical History:   Diagnosis Date    Abscess of left foot 4/22/2022    Anemia associated with acute blood loss 4/26/2022    Callus of foot     Right foot - see's Dr. Vel Hurst    Cellulitis of left foot 4/22/2022    Diabetic ulcer of left midfoot associated with type 2 diabetes mellitus, with muscle involvement without evidence of necrosis ([address] 4/26/2022    Diabetic ulcer of left midfoot associated with type 2 diabetes mellitus, with necrosis of muscle ([address] 6/7/2021    Diabetic ulcer of right midfoot associated with type 2 diabetes mellitus, with fat layer exposed ([address] 8/11/2020    Dizziness     positional    Essential hypertension     Follows with PCP    Hyperlipidemia     Lumbar radiculopathy     Septic embolism ([address] 6/13/2020    Subacute osteomyelitis of left foot ([address] 4/22/2022       FAMILY HISTORY  Family History   Problem Relation Age of Onset    Heart Disease Father     Diabetes Father     Diabetes Mother     Heart Disease Mother     Other Mother     Kidney Disease Mother     Heart Attack Mother        SOCIAL HISTORY  Social History     Socioeconomic History    Marital status: Single     Spouse name: None    Number of children: None    Years of education: None    Highest education level: None   Tobacco Use    Smoking status: Never    Smokeless tobacco: Never   Vaping Use    Vaping Use: Never used   Substance and Sexual Activity    Alcohol use: Yes     Comment: occasionally    Drug use: No    Sexual activity: Yes     Partners: Female     Social Determinants of Health     Financial Resource Strain: Medium Risk    Difficulty of Paying Living Expenses: Somewhat hard   Food Insecurity: Food Insecurity Present    Worried About Running Out of Food in the Last Year: Sometimes true    Ran Out of Food in the Last Year: Sometimes true   Transportation Needs: No Transportation Needs    Lack of Transportation (Medical): No    Lack of Transportation (Non-Medical):  No Physical Activity: Inactive    Days of Exercise per Week: 0 days    Minutes of Exercise per Session: 0 min   Stress: Stress Concern Present    Feeling of Stress : Rather much   Social Connections: Unknown    Frequency of Communication with Friends and Family: Once a week    Attends [employer] Services: Never    Attends Club or Organization Meetings: Never    Marital Status: Never    Housing Stability: Low Risk     Unable to Pay for Housing in the Last Year: No    Number of Places Lived in the Last Year: 1    Unstable Housing in the Last Year: No        SURGICAL HISTORY  Past Surgical History:   Procedure Laterality Date    ACHILLES TENDON SURGERY Right 1/8/2021    RIGHT [address] performed by Tri Cuevas DPM at [address] [facility]  03/2018    [facility]    DENTAL SURGERY      teeth extractions -half per patient    HERNIA REPAIR Bilateral 5/27/2020    [address] performed by Michael Rushing MD at [address] [place] [address] Right 9/2/2022    LEG AMPUTATION BELOW KNEE performed by Celeste Snider MD at [address] [place] [address] Right 9/5/2022    LEG AMPUTATION BELOW KNEE REVISION performed by Celeste Snider MD at [facility] 2018    FL OFFICE/OUTPT VISIT,PROCEDURE ONLY Left 4/17/2018    L5-S1 HEMILAMINECTOMY, REMOVAL OF DISC LEFT SIDE [facility] Rashmi Weber [facility] Right 1/8/2021    RIGHT TRANSMETATARSAL TOE AMPUTATION performed by Tri Cuevas DPM at [address] Left 4/23/2022    LEFT RAY GREAT TOE AMPUTATION performed by Elizabeth Rivera MD at [address] [place] [address]  Current Outpatient Medications   Medication Sig Dispense Refill    FLUoxetine (PROZAC) 40 MG capsule Take 2 capsules by mouth once daily 180 capsule 1    carvedilol (COREG) 12.5 MG tablet Take 1 tablet by mouth 2 times daily (with meals) 180 tablet 1    metFORMIN (GLUCOPHAGE-XR) 500 MG extended release tablet Take 2 tablets by mouth 2 times daily (with meals) 120 tablet 3    insulin glargine (LANTUS SOLOSTAR) 100 UNIT/ML injection pen Inject 10 Units into the skin nightly 5 Adjustable Dose Pre-filled Pen Syringe 3    Dulaglutide 3 MG/0.5ML SOPN Inject 3 mg into the skin once a week 5 Adjustable Dose Pre-filled Pen Syringe 3    Insulin Pen Needle ([employer] PEN NEEDLES) 31G X 6 MM MISC 1 each by Does not apply route daily 100 each 3    amLODIPine (NORVASC) 2.5 MG tablet Take 1 tablet by mouth daily 30 tablet 5    prazosin (MINIPRESS) 1 MG capsule Take 1 capsule by mouth nightly 30 capsule 5    QUEtiapine (SEROQUEL) 200 MG tablet Take 1 tablet by mouth 2 times daily 60 tablet 5    losartan (COZAAR) 100 MG tablet Take 1 tablet by mouth daily 30 tablet 5    clopidogrel (PLAVIX) 75 MG tablet Take 1 tablet by mouth daily 30 tablet 5    atorvastatin (LIPITOR) 40 MG tablet Take 1 tablet by mouth nightly 30 tablet 5    ondansetron (ZOFRAN) 4 MG tablet Take 1 tablet by mouth daily as needed for Nausea or Vomiting 30 tablet 0    Blood Pressure KIT 1 kit by Does not apply route daily 1 kit 0    pantoprazole (PROTONIX) 40 MG tablet Take 1 tablet by mouth every morning (before breakfast) 30 tablet 5    aspirin 81 MG chewable tablet Take 1 tablet by mouth daily Take 81 mg by mouth daily 30 tablet 2    hydrOXYzine pamoate (VISTARIL) 50 MG capsule Take 50 mg by mouth every 6 hours as needed for Anxiety      docusate sodium (COLACE, DULCOLAX) 100 MG CAPS Take 100 mg by mouth daily      Alcohol Swabs PADS       blood glucose monitor strips Test 2 times a day & as needed for symptoms of irregular blood glucose. Dispense sufficient amount for indicated testing frequency plus additional to accommodate PRN testing needs.  100 strip 0    FreeStyle Lancets MISC 1 each by Does not apply route daily 100 each 3     No current facility-administered medications for this visit. ALLERGIES  No Known Allergies    PHYSICAL EXAM    BP (!) 138/90 (Site: Left Upper Arm, Position: Sitting)   Pulse 91   SpO2 98%     Physical Exam  Vitals and nursing note reviewed. Constitutional:       General: He is not in acute distress. Appearance: Normal appearance. He is normal weight. HENT:      Head: Normocephalic and atraumatic. Eyes:      General: No scleral icterus. Conjunctiva/sclera: Conjunctivae normal.   Pulmonary:      Effort: Pulmonary effort is normal.   Musculoskeletal:      Right Lower Extremity: Right leg is amputated below knee. Skin:     General: Skin is warm and dry. Neurological:      Mental Status: He is alert and oriented to person, place, and time. Mental status is at baseline. Psychiatric:         Attention and Perception: Attention and perception normal.         Mood and Affect: Mood is depressed. Speech: Speech normal.         Behavior: Behavior normal. Behavior is cooperative. Thought Content: Thought content normal. Thought content does not include suicidal ideation. Thought content does not include suicidal plan. Cognition and Memory: Cognition and memory normal.         Judgment: Judgment normal.       ASSESSMENT & PLAN    Delmy Wilks was seen today for 1 month follow-up. Diagnoses and all orders for this visit:    Type 2 diabetes mellitus with diabetic neuropathy, without long-term current use of insulin (HCC)  Episodes of hyperglycemia recently. Will add Metformin and Lantus back and increase Trulicity. Continue to monitor blood sugars and notify office of episodes of hypoglycemia. -     metFORMIN (GLUCOPHAGE-XR) 500 MG extended release tablet; Take 2 tablets by mouth 2 times daily (with meals)  -     insulin glargine (LANTUS SOLOSTAR) 100 UNIT/ML injection pen;  Inject 10 Units into the skin nightly  -     Dulaglutide 3 MG/0.5ML SOPN; Inject 3 mg into the skin once a week  -     Insulin Pen Needle ([employer] PEN NEEDLES) 31G X 6 MM MISC; 1 each by Does not apply route daily    Essential hypertension  Slightly elevated in office. Will add Amlodipine. -     carvedilol (COREG) 12.5 MG tablet; Take 1 tablet by mouth 2 times daily (with meals)  -     amLODIPine (NORVASC) 2.5 MG tablet; Take 1 tablet by mouth daily    Severe episode of recurrent major depressive disorder, without psychotic features [address]  Condition stable on current regimen. No changes. Mood has improved greatly.   -     FLUoxetine (PROZAC) 40 MG capsule; Take 2 capsules by mouth once daily    Insomnia, unspecified type  Condition stable on current regimen. No changes. Return in about 6 weeks (around 3/21/2023).          Electronically signed by RAFAEL Cervantes CNP on 2/7/2023

## 2023-02-08 ENCOUNTER — CARE COORDINATION (OUTPATIENT)
Dept: CARE COORDINATION | Age: 43
End: 2023-02-08

## 2023-02-14 ENCOUNTER — HOSPITAL ENCOUNTER (OUTPATIENT)
Dept: WOUND CARE | Age: 43
Discharge: HOME | End: 2023-02-14
Payer: MEDICARE

## 2023-02-14 ENCOUNTER — CARE COORDINATION (OUTPATIENT)
Dept: CARE COORDINATION | Age: 43
End: 2023-02-14

## 2023-02-14 VITALS
RESPIRATION RATE: 17 BRPM | DIASTOLIC BLOOD PRESSURE: 88 MMHG | SYSTOLIC BLOOD PRESSURE: 119 MMHG | TEMPERATURE: 98.4 F | HEART RATE: 101 BPM

## 2023-02-14 DIAGNOSIS — L97.522 DIABETIC ULCER OF TOE OF LEFT FOOT ASSOCIATED WITH TYPE 2 DIABETES MELLITUS, WITH FAT LAYER EXPOSED (HCC): ICD-10-CM

## 2023-02-14 DIAGNOSIS — L03.116 CELLULITIS OF LEFT FOOT: ICD-10-CM

## 2023-02-14 DIAGNOSIS — L97.422 DIABETIC ULCER OF LEFT MIDFOOT ASSOCIATED WITH TYPE 2 DIABETES MELLITUS, WITH FAT LAYER EXPOSED (HCC): Primary | ICD-10-CM

## 2023-02-14 DIAGNOSIS — E11.621 DIABETIC ULCER OF TOE OF LEFT FOOT ASSOCIATED WITH TYPE 2 DIABETES MELLITUS, WITH FAT LAYER EXPOSED (HCC): ICD-10-CM

## 2023-02-14 DIAGNOSIS — Z89.432 PARTIAL NONTRAUMATIC AMPUTATION OF FOOT, LEFT (HCC): ICD-10-CM

## 2023-02-14 DIAGNOSIS — E11.621 DIABETIC ULCER OF LEFT MIDFOOT ASSOCIATED WITH TYPE 2 DIABETES MELLITUS, WITH FAT LAYER EXPOSED (HCC): Primary | ICD-10-CM

## 2023-02-14 PROCEDURE — 11042 DBRDMT SUBQ TIS 1ST 20SQCM/<: CPT

## 2023-02-14 PROCEDURE — 11042 DBRDMT SUBQ TIS 1ST 20SQCM/<: CPT | Performed by: NURSE PRACTITIONER

## 2023-02-14 RX ORDER — GENTAMICIN SULFATE 1 MG/G
OINTMENT TOPICAL ONCE
OUTPATIENT
Start: 2023-02-14 | End: 2023-02-14

## 2023-02-14 RX ORDER — BACITRACIN, NEOMYCIN, POLYMYXIN B 400; 3.5; 5 [USP'U]/G; MG/G; [USP'U]/G
OINTMENT TOPICAL ONCE
OUTPATIENT
Start: 2023-02-14 | End: 2023-02-14

## 2023-02-14 RX ORDER — LIDOCAINE HYDROCHLORIDE 40 MG/ML
SOLUTION TOPICAL ONCE
OUTPATIENT
Start: 2023-02-14 | End: 2023-02-14

## 2023-02-14 RX ORDER — CLOBETASOL PROPIONATE 0.5 MG/G
OINTMENT TOPICAL ONCE
OUTPATIENT
Start: 2023-02-14 | End: 2023-02-14

## 2023-02-14 RX ORDER — LIDOCAINE HYDROCHLORIDE 20 MG/ML
JELLY TOPICAL ONCE
OUTPATIENT
Start: 2023-02-14 | End: 2023-02-14

## 2023-02-14 RX ORDER — BACITRACIN ZINC AND POLYMYXIN B SULFATE 500; 1000 [USP'U]/G; [USP'U]/G
OINTMENT TOPICAL ONCE
OUTPATIENT
Start: 2023-02-14 | End: 2023-02-14

## 2023-02-14 RX ORDER — GINSENG 100 MG
CAPSULE ORAL ONCE
OUTPATIENT
Start: 2023-02-14 | End: 2023-02-14

## 2023-02-14 RX ORDER — BETAMETHASONE DIPROPIONATE 0.05 %
OINTMENT (GRAM) TOPICAL ONCE
OUTPATIENT
Start: 2023-02-14 | End: 2023-02-14

## 2023-02-14 RX ORDER — LIDOCAINE 50 MG/G
OINTMENT TOPICAL ONCE
OUTPATIENT
Start: 2023-02-14 | End: 2023-02-14

## 2023-02-14 RX ORDER — LIDOCAINE 40 MG/G
CREAM TOPICAL ONCE
OUTPATIENT
Start: 2023-02-14 | End: 2023-02-14

## 2023-02-14 ASSESSMENT — PAIN SCALES - WONG BAKER: WONGBAKER_NUMERICALRESPONSE: 0

## 2023-02-14 ASSESSMENT — PAIN SCALES - GENERAL: PAINLEVEL_OUTOF10: 0

## 2023-02-14 NOTE — DISCHARGE INSTRUCTIONS
PHYSICIAN ORDERS AND DISCHARGE INSTRUCTIONS     NOTE: Upon discharge from the [address], you will receive a patient experience survey. We would be grateful if you would take the time to fill this survey out. Wound cleansing:              Do not scrub or use excessive force. Wash hands with soap and water before and after dressing changes. Prior to applying a clean dressing, cleanse wound with normal saline, wound cleanser, or mild soap and water. Ask the physician or nurse before getting the wound(s) wet in a shower     Daily Wound management:              Keep weight off wounds and reposition every 2 hours. Avoid standing for long periods of time. Apply wraps/stockings in AM and remove at bedtime. If swelling is present, elevate legs to the level of the heart or above for 30 minutes 4-5 times a day and/or when sitting. When taking antibiotics take entire prescription as ordered by physician do not stop taking until medicine is all gone. Wound Care Notes:  Rx: Sherman Millan  Apply for grafts 05/11/22: aLL GRAFTS approved 06/08/22 for Left Toe amp site  Reapply for graft 08/22/22 for Left toes amp site   YADY's   Right 1.07      Left    1.1           Date: 08/15/22   Home Care Discontinued. Needs Nurse visit         Grafts Left Foot Amp Site  Puraply #1 Graft #1 4x4 fenestrated 06/15/22   Puraply #2 Graft #2 4x4 fenestrated 06/22/22                                    Orders for this week: 2/14/23      Left Plantar Foot wound -- Clean with soap and water, pat dry. Apply A&D  Wrap with Conform and Coban  Leave in place for 1 week. ((((PLEASE DO NOT REMOVE THE LEFT LEG DRESSING))))        Right amp site - Apply Anasept gel and Shwetha to wound bed. Cover with Silcone border. Wear stump .   Leave dressing in place for 1 week      Wound vac discontinued 23, may return to [facility]      Follow Up Instructions: At the [address] in 1 week: Tuesday   Primary Wound Care Provider: Rossie Bumpers, CNP   Call  for any questions or concerns.   Central [phone]

## 2023-02-14 NOTE — CARE COORDINATION
Phone call to Pt who reported he is permanently off the woundvac and would like this SW to send referral to [facility] [address] waiver. Pt will go to clinic to start process for a prosthetic limb. Pt stated he will likely need a walker / cane to get around while adjusting to his prosthetic limb. Merged call to [employer]. No answer, voicemail message left requesting return call, provided Pt's and this SW's phone numbers. SW offered to merge call to low income housing properties. Pt declined, requesting this SW to call back on 2/15 @ 1pm to assist in calling properties at that time. Phone call to Lisa with [employer] to make referral for KEVIN tuttle.      KOLE plan of care:  KOLE will assist Pt in contacting low income housing properties on 2/15 @ 1pm.

## 2023-02-14 NOTE — PROGRESS NOTES
Wound Care Center Progress Note With Procedure    Federica Jerome  AGE: 43 y.o. GENDER: male  : 1980  EPISODE DATE:  2023     Subjective:       CHIEF COMPLAINT  WOUND   Chief Complaint   Patient presents with    Wound Check         HISTORY of PRESENT ILLNESS      Fedreica Jerome is a 43 y.o. male who presents today for wound evaluation of Chronic diabetic, pressure and non-healing surgical ulcer(s) of the left medial foot and lateral plantar surface. The ulcer is of marked severity. The underlying cause of the wound is diabetes, non-healing surgical. He presents today with a new wound to the right plantar foot that is diabetic and pressure in nature and of moderate severity. The patient has significant underlying medical conditions as below. The patient is having significant depression related to the recent and sudden death of his mother. 23: Patient has an abscess left shoulder/neck area. The patient was seen by [address] [place] [address] Box Counts include 234 beds at the [facility] for a possible spider bite was referred to surgical consult and was to start Keflex and Bactrim. He then went to the ED 23 with I&D abscess. Today the abscess has not been completely evacuated with repeat I&D as below. 10/20/22: The patient was here last 10/4/22 and his right BKA site was well approximated, staples removed and steri strips. The prosthetic supplier was with him and plans had started to get fitted. Unfortunately, the patient fell the same night at home and the wound dehisced. He was evaluated at the ED. The patient then was not seen at the wound clinic for a few weeks as he was hospitalized for suicidal ideation. Today able to probe and visibly see bone. Home Health still in place, will apply for a wound vac. Will also start Bactrim. 22: Since his last visit to the wound clinic 22 the patient was hospitalized -22) at [facility] with necrotizing fascitis right lower leg with resulting right BKA. 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 Aubrie Díaz. She placed a wound VAC to on the stump at that time. On 9/5/2022 Dr. Franc Durant performed a primary closure of his right BKA stump. Wound Pain Timing/Severity: none  Quality of pain: N/A  Severity of pain:  0 / 10   Modifying Factors: diabetes and chronic pressure  Associated Signs/Symptoms: drainage     Diabetes: Yes, on an oral and insulin regimen  Hemoglobin A1C   Date Value Ref Range Status   01/11/2023 7.6 See comment % Final     Comment:     Comment:  Diagnosis of Diabetes: > or = 6.5%  Increased risk of diabetes (Prediabetes): 5.7-6.4%  Glycemic Control: Nonpregnant Adults: <7.0%                    Pregnant: <6.0%          Diabetes education provided today:    Diabetes pathoetiology, difference between type 1 and type 2 diabetes, and progressive nature of Type 2 DM. Diabetic Neuropathy: signs and therapy. Foot care: advised to wash feet daily, pat dry and apply lotion at night, avoiding between toes. Need to look at feet daily and report to a physician any signs of inflammation or skin damage. Discussed diabetes shoes and socks. Diabetic management related to wound care    Smoking: Never smoker  Obesity:No  Anticoagulant therapy: No  Immunosuppression: No    Patient educated on the 6 essential components necessary for wound healing: Circulation, Debridements, Proper Dressings and Topical Wound Products, Infection Control, Edema Control and Offloading. Patient educated on those factors that negatively effect or impact wound healing: smoking, obesity, uncontrolled diabetes, anticoagulant and immunosuppressive regimens, inadequate nutrition, untreated arterial and venous disease if applicable and measures to manage edema. Nutritional status: well nourished. Discussed need for increased protein and calories for wound healing and good sources of protein (just over 7 grams for every 20 pounds of body weight). Animal-based foods high in protein (meat, poultry, fish, eggs, and dairy foods). Plant based foods high in protein (tofu, lentils, beans, chickpeas, nuts, quinoa and den seeds. Off Loading  Offloading or minimizing or removing weight placed on an area with poor circulation such as diabetic wounds or pressure. This can be achieved with crutches, wheel chair, knee walker etc. Minimizing pressure through partial weight bearing (minimizing the amount of  pressure applied and or the amount of time on the area of pressure) or maintaining a non-weight bearing status can be used to promote and often can be essential for thee wound to heal. Off loading may also need to be achieved for non-weight bearing wounds such as pressure ulcers to the torso. Turning and changing positions frequently, at least every two hours. Use of pressure cushion if sitting up in chair. Skin Care  Keep skin clean and well moisturized , moisturize routinely with ointments for heavier moisturizer needs for extremely dry skin or cracks such as A&D ointment and lotions for a light moisturizer such as CeraVe or Eucerin. If incontinent change incontinence garments as soon as soiled and keeping skin clean and use barrier cream to protect the skin.         PAST MEDICAL HISTORY        Diagnosis Date    Abscess of left foot 4/22/2022    Anemia associated with acute blood loss 4/26/2022    Callus of foot     Right foot - see's Dr. Bonny Boyce    Cellulitis of left foot 4/22/2022    Diabetic ulcer of left midfoot associated with type 2 diabetes mellitus, with muscle involvement without evidence of necrosis ([address] 4/26/2022    Diabetic ulcer of left midfoot associated with type 2 diabetes mellitus, with necrosis of muscle ([address] 6/7/2021    Diabetic ulcer of right midfoot associated with type 2 diabetes mellitus, with fat layer exposed ([address] 8/11/2020    Dizziness     positional    Essential hypertension     Follows with PCP    Hyperlipidemia     Lumbar radiculopathy     Septic embolism ([facility] 6/13/2020    Subacute osteomyelitis of left foot ([facility] 4/22/2022       PAST SURGICAL HISTORY    Past Surgical History:   Procedure Laterality Date    ACHILLES TENDON SURGERY Right 1/8/2021    RIGHT ACHILLES TENDON LENGTHENING REPAIR performed by Arnol Denise DPM at [address]  03/2018    [facility]    DENTAL SURGERY      teeth extractions -half per patient    HERNIA REPAIR Bilateral 5/27/2020    BIALTERAL HERNIA INGUINAL REPAIR performed by Orlin Armstrong MD at [address] Right 9/2/2022    LEG AMPUTATION BELOW KNEE performed by Ale Escobar MD at [address] Right 9/5/2022    LEG AMPUTATION BELOW KNEE REVISION performed by Ale Escobar MD at [facility] 2018    MO OFFICE/OUTPT VISIT,PROCEDURE ONLY Left 4/17/2018    L5-S1 HEMILAMINECTOMY, REMOVAL OF DISC LEFT SIDE performed by Esvin Pollock MD at [address] Right 1/8/2021    RIGHT TRANSMETATARSAL TOE AMPUTATION performed by Arnol Denise DPM at [address] Left 4/23/2022    LEFT RAY GREAT TOE AMPUTATION performed by Angelo Mendoza MD at [address]    Family History   Problem Relation Age of Onset    Heart Disease Father     Diabetes Father     Diabetes Mother     Heart Disease Mother     Other Mother     Kidney Disease Mother     Heart Attack Mother        SOCIAL HISTORY    Social History     Tobacco Use    Smoking status: Never    Smokeless tobacco: Never   Vaping Use    Vaping Use: Never used   Substance Use Topics    Alcohol use: Yes     Comment: occasionally    Drug use: No       ALLERGIES    No Known Allergies    MEDICATIONS    Current Outpatient Medications on File Prior to Encounter   Medication Sig Dispense Refill    FLUoxetine (PROZAC) 40 MG capsule Take 2 capsules by mouth once daily 180 capsule 1 carvedilol (COREG) 12.5 MG tablet Take 1 tablet by mouth 2 times daily (with meals) 180 tablet 1    metFORMIN (GLUCOPHAGE-XR) 500 MG extended release tablet Take 2 tablets by mouth 2 times daily (with meals) 120 tablet 3    insulin glargine (LANTUS SOLOSTAR) 100 UNIT/ML injection pen Inject 10 Units into the skin nightly 5 Adjustable Dose Pre-filled Pen Syringe 3    Dulaglutide 3 MG/0.5ML SOPN Inject 3 mg into the skin once a week 5 Adjustable Dose Pre-filled Pen Syringe 3    Insulin Pen Needle ([employer] PEN NEEDLES) 31G X 6 MM MISC 1 each by Does not apply route daily 100 each 3    amLODIPine (NORVASC) 2.5 MG tablet Take 1 tablet by mouth daily 30 tablet 5    prazosin (MINIPRESS) 1 MG capsule Take 1 capsule by mouth nightly 30 capsule 5    QUEtiapine (SEROQUEL) 200 MG tablet Take 1 tablet by mouth 2 times daily 60 tablet 5    losartan (COZAAR) 100 MG tablet Take 1 tablet by mouth daily 30 tablet 5    clopidogrel (PLAVIX) 75 MG tablet Take 1 tablet by mouth daily 30 tablet 5    atorvastatin (LIPITOR) 40 MG tablet Take 1 tablet by mouth nightly 30 tablet 5    ondansetron (ZOFRAN) 4 MG tablet Take 1 tablet by mouth daily as needed for Nausea or Vomiting 30 tablet 0    Blood Pressure KIT 1 kit by Does not apply route daily 1 kit 0    pantoprazole (PROTONIX) 40 MG tablet Take 1 tablet by mouth every morning (before breakfast) 30 tablet 5    aspirin 81 MG chewable tablet Take 1 tablet by mouth daily Take 81 mg by mouth daily 30 tablet 2    hydrOXYzine pamoate (VISTARIL) 50 MG capsule Take 50 mg by mouth every 6 hours as needed for Anxiety      docusate sodium (COLACE, DULCOLAX) 100 MG CAPS Take 100 mg by mouth daily      [DISCONTINUED] metoclopramide (REGLAN) 10 MG tablet Take 1 tablet by mouth 4 times daily (before meals and nightly) 120 tablet 3    Alcohol Swabs PADS       blood glucose monitor strips Test 2 times a day & as needed for symptoms of irregular blood glucose.  Dispense sufficient amount for indicated testing frequency plus additional to accommodate PRN testing needs. 100 strip 0    [DISCONTINUED] pioglitazone (ACTOS) 30 MG tablet Take 1 tablet by mouth daily 90 tablet 1    FreeStyle Lancets MISC 1 each by Does not apply route daily 100 each 3    [DISCONTINUED] acetaminophen (TYLENOL) 325 MG tablet Take 2 tablets by mouth every 4 hours as needed for Pain or Fever 120 tablet 3     No current facility-administered medications on file prior to encounter.       REVIEW OF SYSTEMS    Pertinent items are noted in HPI.    Constitutional: Negative for systemic symptoms including fever, chills and malaise.      Objective:      /88   Pulse (!) 101   Temp 98.4 °F (36.9 °C) (Temporal)   Resp 17     PHYSICAL EXAM      General: The patient is in no acute distress.    Mental status:  Patient is appropriate, is  oriented to place and plan of care.  Dermatologic exam: Visual inspection of the periwound reveals the skin to be normal in turgor and texture  Wound exam: see wound description below in procedure note      Assessment:     Problem List Items Addressed This Visit          Endocrine    WD-Diabetic ulcer of left midfoot associated with type 2 diabetes mellitus, with fat layer exposed (HCC) - Primary    Relevant Orders    Initiate Outpatient Wound Care Protocol    Diabetic ulcer of toe of left foot associated with type 2 diabetes mellitus, with fat layer exposed (HCC)    Relevant Orders    Initiate Outpatient Wound Care Protocol       Other    Cellulitis of left foot    Relevant Orders    Initiate Outpatient Wound Care Protocol    WD-Partial nontraumatic amputation of foot, left (HCC)    Relevant Orders    Initiate Outpatient Wound Care Protocol     Procedure Note    Indications:  Based on my examination of this patient's wound(s) today, sharp excision into necrotic subcutaneous tissue is required to promote healing and evaluate the extent of previous healing.    Performed by: RAFAEL Zambrano - CNP    Consent obtained:  Yes    Time out taken:  Yes    Pain Control: Anesthetic  Anesthetic:       Debridement:Excisional Debridement    Using curette the wound(s) was/were sharply debrided down through and including the removal of subcutaneous      Devitalized Tissue Debrided:  fibrin, biofilm, slough, necrotic/eschar, and exudate    Pre Debridement Measurements:  Are located in the Wound Documentation Flow Sheet    All active wounds listed below with today's date are evaluated  Wound(s)    debrided this date include # : 3    Post  Debridement Measurements:  Wound 10/20/22 #3 right amp site. (Active)   Wound Image   01/24/23 0946   Wound Etiology Non-Healing Surgical 02/14/23 0932   Dressing Status New dressing applied 02/14/23 0932   Wound Cleansed Wound cleanser 02/14/23 0858   Dressing/Treatment Other (comment) 02/07/23 1415   Offloading for Diabetic Foot Ulcers Other (comment) 02/14/23 0932   Wound Length (cm) 0.2 cm 02/14/23 0858   Wound Width (cm) 0.2 cm 02/14/23 0858   Wound Depth (cm) 0.1 cm 02/14/23 0858   Wound Surface Area (cm^2) 0.04 cm^2 02/14/23 0858   Change in Wound Size % (l*w) 99.29 02/14/23 0858   Wound Volume (cm^3) 0.004 cm^3 02/14/23 0858   Wound Healing % 100 02/14/23 0858   Post-Procedure Length (cm) 0.2 cm 02/14/23 0934   Post-Procedure Width (cm) 0.2 cm 02/14/23 0934   Post-Procedure Depth (cm) 0.1 cm 02/14/23 0934   Post-Procedure Surface Area (cm^2) 0.04 cm^2 02/14/23 0934   Post-Procedure Volume (cm^3) 0.004 cm^3 02/14/23 0934   Distance Tunneling (cm) 0 cm 02/14/23 0858   Tunneling Position ___ O'Clock 0 02/14/23 0858   Undermining Starts ___ O'Clock 0 02/14/23 0858   Undermining Ends___ O'Clock 0 02/14/23 0858   Undermining Maxium Distance (cm) 0 02/14/23 0858   Wound Assessment Pink/red;Slough; Devitalized tissue 02/14/23 0858   Drainage Amount Scant 02/14/23 0858   Drainage Description Yellow 02/14/23 0858   Odor None 02/14/23 0858   Rosalie-wound Assessment Fragile 02/14/23 0858   Margins Defined edges 02/14/23 0858   Wound Thickness Description not for Pressure Injury Full thickness 02/14/23 0858   Number of days: 117     Total  Area  Debrided: 0.04 sq cm     Bleeding:  Minimal    Hemostasis Achieved:  by pressure    Procedural Pain:  0  / 10     Post Procedural Pain:  0 / 10     Response to treatment:  Well tolerated by patient. Status of wound progress and description from last visit: BKA site improving, regimen as below, follow up in one week.  rep with him today and a stump  placed. Abscess left shoulder/neck with I&D remains healed. The patient continues to orellana depression post his mother's death. Plan:       Discharge Instructions         PHYSICIAN ORDERS AND DISCHARGE INSTRUCTIONS     NOTE: Upon discharge from the [address], you will receive a patient experience survey. We would be grateful if you would take the time to fill this survey out. Wound cleansing:              Do not scrub or use excessive force. Wash hands with soap and water before and after dressing changes. Prior to applying a clean dressing, cleanse wound with normal saline, wound cleanser, or mild soap and water. Ask the physician or nurse before getting the wound(s) wet in a shower     Daily Wound management:              Keep weight off wounds and reposition every 2 hours. Avoid standing for long periods of time. Apply wraps/stockings in AM and remove at bedtime. If swelling is present, elevate legs to the level of the heart or above for 30 minutes 4-5 times a day and/or when sitting. When taking antibiotics take entire prescription as ordered by physician do not stop taking until medicine is all gone.                               Wound Care Notes:  Rx: Lesa Sever  Apply for grafts 05/11/22: aLL GRAFTS approved 06/08/22 for Left Toe amp site  Reapply for graft 08/22/22 for Left toes amp site   YADY's   Right 1.07      Left    1.1           Date: 08/15/22   Home Care Discontinued. Needs Nurse visit         Grafts Left Foot Amp Site  Puraply #1 Graft #1 4x4 fenestrated 06/15/22   Puraply #2 Graft #2 4x4 fenestrated 22                                    Orders for this week: 23      Left Plantar Foot wound -- Clean with soap and water, pat dry. Apply A&D  Wrap with Conform and Coban  Leave in place for 1 week. ((((PLEASE DO NOT REMOVE THE LEFT LEG DRESSING))))        Right amp site - Apply Anasept gel and Ariadna to wound bed. Cover with Silcone border. Wear stump . Leave dressing in place for 1 week      Wound vac discontinued 23, may return to [facility]      Follow Up Instructions: At the [address] in 1 week: Tuesday   Primary Wound Care Provider: Meghan Headley CNP   Call  for any questions or concerns. Central [phone]        Treatment Note Wound 10/20/22 #3 right amp site. -Dressing/Treatment:  (anasept gel,ariadna,silicone border.  per provider)    Written Patient Dismissal Instructions Given            Electronically signed by RAFAEL Arita - CNP on 2023 at 1:42 PM

## 2023-02-15 ENCOUNTER — CARE COORDINATION (OUTPATIENT)
Dept: CARE COORDINATION | Age: 43
End: 2023-02-15

## 2023-02-15 NOTE — CARE COORDINATION
Received a phone call from Pt stating he was up all night and isn't feeling well. Pt reported he would like to cancel our call scheduled for 1pm.  Discussed plans to reach out to Pt on Friday to see if he feels like making calls to low income housing properties at this time. Pt was in agreement. SW plan of care:  SW will follow up with Pt on 2/17 regarding low income housing.

## 2023-02-17 ENCOUNTER — CARE COORDINATION (OUTPATIENT)
Dept: CARE COORDINATION | Age: 43
End: 2023-02-17

## 2023-02-17 NOTE — CARE COORDINATION
Phone call to Pt who reported he is feeling better, stating he slept through the day yesterday. SW and Pt discussed calling low income housing on 2/20 as there is concern no one will be available on Friday afternoon. Discussed plan to call Pt on 2/20 @ 11am to assist in calling low income housing properties. Pt was in agreement  Asked Pt if he has heard from Nena Wilkerson with [employer] regarding AL waiver. Pt reported he had some missed calls from some different numbers, but they didn't leave a message.      KOLE plan of care:  SW will call assist Pt in calling low income housing properties on 2/20 @ 11am.

## 2023-02-20 ENCOUNTER — CARE COORDINATION (OUTPATIENT)
Dept: CARE COORDINATION | Age: 43
End: 2023-02-20

## 2023-02-21 ENCOUNTER — HOSPITAL ENCOUNTER (OUTPATIENT)
Dept: WOUND CARE | Age: 43
Discharge: HOME | End: 2023-02-21
Payer: MEDICARE

## 2023-02-21 VITALS
HEART RATE: 92 BPM | DIASTOLIC BLOOD PRESSURE: 84 MMHG | SYSTOLIC BLOOD PRESSURE: 146 MMHG | TEMPERATURE: 98.1 F | RESPIRATION RATE: 18 BRPM

## 2023-02-21 DIAGNOSIS — E11.621 DIABETIC ULCER OF LEFT MIDFOOT ASSOCIATED WITH TYPE 2 DIABETES MELLITUS, WITH FAT LAYER EXPOSED (HCC): Primary | ICD-10-CM

## 2023-02-21 DIAGNOSIS — L03.116 CELLULITIS OF LEFT FOOT: ICD-10-CM

## 2023-02-21 DIAGNOSIS — E11.621 DIABETIC ULCER OF TOE OF LEFT FOOT ASSOCIATED WITH TYPE 2 DIABETES MELLITUS, WITH FAT LAYER EXPOSED (HCC): ICD-10-CM

## 2023-02-21 DIAGNOSIS — L97.422 DIABETIC ULCER OF LEFT MIDFOOT ASSOCIATED WITH TYPE 2 DIABETES MELLITUS, WITH FAT LAYER EXPOSED (HCC): Primary | ICD-10-CM

## 2023-02-21 DIAGNOSIS — L97.522 DIABETIC ULCER OF TOE OF LEFT FOOT ASSOCIATED WITH TYPE 2 DIABETES MELLITUS, WITH FAT LAYER EXPOSED (HCC): ICD-10-CM

## 2023-02-21 DIAGNOSIS — Z89.432 PARTIAL NONTRAUMATIC AMPUTATION OF FOOT, LEFT (HCC): ICD-10-CM

## 2023-02-21 PROCEDURE — 11055 PARING/CUTG B9 HYPRKER LES 1: CPT

## 2023-02-21 PROCEDURE — 11042 DBRDMT SUBQ TIS 1ST 20SQCM/<: CPT | Performed by: NURSE PRACTITIONER

## 2023-02-21 PROCEDURE — 11042 DBRDMT SUBQ TIS 1ST 20SQCM/<: CPT

## 2023-02-21 RX ORDER — BACITRACIN, NEOMYCIN, POLYMYXIN B 400; 3.5; 5 [USP'U]/G; MG/G; [USP'U]/G
OINTMENT TOPICAL ONCE
OUTPATIENT
Start: 2023-02-21 | End: 2023-02-21

## 2023-02-21 RX ORDER — GINSENG 100 MG
CAPSULE ORAL ONCE
OUTPATIENT
Start: 2023-02-21 | End: 2023-02-21

## 2023-02-21 RX ORDER — BETAMETHASONE DIPROPIONATE 0.05 %
OINTMENT (GRAM) TOPICAL ONCE
OUTPATIENT
Start: 2023-02-21 | End: 2023-02-21

## 2023-02-21 RX ORDER — LIDOCAINE HYDROCHLORIDE 40 MG/ML
SOLUTION TOPICAL ONCE
OUTPATIENT
Start: 2023-02-21 | End: 2023-02-21

## 2023-02-21 RX ORDER — GENTAMICIN SULFATE 1 MG/G
OINTMENT TOPICAL ONCE
OUTPATIENT
Start: 2023-02-21 | End: 2023-02-21

## 2023-02-21 RX ORDER — LIDOCAINE HYDROCHLORIDE 20 MG/ML
JELLY TOPICAL ONCE
OUTPATIENT
Start: 2023-02-21 | End: 2023-02-21

## 2023-02-21 RX ORDER — BACITRACIN ZINC AND POLYMYXIN B SULFATE 500; 1000 [USP'U]/G; [USP'U]/G
OINTMENT TOPICAL ONCE
OUTPATIENT
Start: 2023-02-21 | End: 2023-02-21

## 2023-02-21 RX ORDER — LIDOCAINE 40 MG/G
CREAM TOPICAL ONCE
OUTPATIENT
Start: 2023-02-21 | End: 2023-02-21

## 2023-02-21 RX ORDER — CLOBETASOL PROPIONATE 0.5 MG/G
OINTMENT TOPICAL ONCE
OUTPATIENT
Start: 2023-02-21 | End: 2023-02-21

## 2023-02-21 RX ORDER — LIDOCAINE 50 MG/G
OINTMENT TOPICAL ONCE
OUTPATIENT
Start: 2023-02-21 | End: 2023-02-21

## 2023-02-21 ASSESSMENT — PAIN SCALES - GENERAL: PAINLEVEL_OUTOF10: 0

## 2023-02-21 NOTE — DISCHARGE INSTRUCTIONS
PHYSICIAN ORDERS AND DISCHARGE INSTRUCTIONS     NOTE: Upon discharge from the [address], you will receive a patient experience survey. We would be grateful if you would take the time to fill this survey out. Wound cleansing:              Do not scrub or use excessive force. Wash hands with soap and water before and after dressing changes. Prior to applying a clean dressing, cleanse wound with normal saline, wound cleanser, or mild soap and water. Ask the physician or nurse before getting the wound(s) wet in a shower     Daily Wound management:              Keep weight off wounds and reposition every 2 hours. Avoid standing for long periods of time. Apply wraps/stockings in AM and remove at bedtime. If swelling is present, elevate legs to the level of the heart or above for 30 minutes 4-5 times a day and/or when sitting. When taking antibiotics take entire prescription as ordered by physician do not stop taking until medicine is all gone. Wound Care Notes:  Rx: Luz Bautista  Apply for grafts 05/11/22: aLL GRAFTS approved 06/08/22 for Left Toe amp site  Reapply for graft 08/22/22 for Left toes amp site   YADY's   Right 1.07      Left    1.1           Date: 08/15/22   Home Care Discontinued. Needs Nurse visit         Grafts Left Foot Amp Site  Puraply #1 Graft #1 4x4 fenestrated 06/15/22   Puraply #2 Graft #2 4x4 fenestrated 06/22/22                                    Orders for this week: 2/21/23      Left Plantar Foot wound -- Clean with soap and water, pat dry. Apply A&D  Wrap with Conform and Coban  Leave in place for 1 week. ((((PLEASE DO NOT REMOVE THE LEFT LEG DRESSING))))        Right amp site - Apply Anasept gel and Shwetha to wound bed. Cover with Silcone border. Wear stump .   Leave dressing in place for 1 week      Wound vac discontinued 23, [facility] return to [facility]        Follow Up Instructions: At the [address] in 1 week: Tuesday   Primary Wound Care Provider: Karthik Dykes CNP   Call  for any questions or concerns.   Central [phone]

## 2023-02-21 NOTE — PROGRESS NOTES
Wound Care Center Progress Note With Procedure    Julien Howard  AGE: 42 y.o.   GENDER: male  : 1980  EPISODE DATE:  2023     Subjective:       CHIEF COMPLAINT  WOUND   Chief Complaint   Patient presents with    Wound Check         HISTORY of PRESENT ILLNESS      Julien Howard is a 42 y.o. male who presents today for wound evaluation of Chronic diabetic, pressure and non-healing surgical ulcer(s) of the left medial foot and lateral plantar surface. The ulcer is of marked severity. The underlying cause of the wound is diabetes, non-healing surgical. He presents today with a new wound to the right plantar foot that is diabetic and pressure in nature and of moderate severity. The patient has significant underlying medical conditions as below. The patient is having significant depression related to the recent and sudden death of his mother.      23: Patient has an abscess left shoulder/neck area. The patient was seen by [facility] Genny Whitley for a possible spider bite was referred to surgical consult and was to start Keflex and Bactrim. He then went to the ED 23 with I&D abscess. Today the abscess has not been completely evacuated with repeat I&D as below.     10/20/22: The patient was here last 10/4/22 and his right BKA site was well approximated, staples removed and steri strips. The prosthetic supplier was with him and plans had started to get fitted. Unfortunately, the patient fell the same night at home and the wound dehisced. He was evaluated at the ED. The patient then was not seen at the wound clinic for a few weeks as he was hospitalized for suicidal ideation. Today able to probe and visibly see bone. Home Health still in place, will apply for a wound vac.Will also start Bactrim.    22: Since his last visit to the wound clinic 22 the patient was hospitalized -22) at [facility] with necrotizing fascitis right lower leg with resulting right BKA.  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 Anders Briggs. She placed a wound VAC to on the stump at that time. On 9/5/2022 Dr. Dilma Rivera performed a primary closure of his right BKA stump. Wound Pain Timing/Severity: none  Quality of pain: N/A  Severity of pain:  0 / 10   Modifying Factors: diabetes and chronic pressure  Associated Signs/Symptoms: drainage     Diabetes: Yes, on an oral and insulin regimen  Hemoglobin A1C   Date Value Ref Range Status   01/11/2023 7.6 See comment % Final     Comment:     Comment:  Diagnosis of Diabetes: > or = 6.5%  Increased risk of diabetes (Prediabetes): 5.7-6.4%  Glycemic Control: Nonpregnant Adults: <7.0%                    Pregnant: <6.0%          Diabetes education provided today:    Diabetes pathoetiology, difference between type 1 and type 2 diabetes, and progressive nature of Type 2 DM. Diabetic Neuropathy: signs and therapy. Foot care: advised to wash feet daily, pat dry and apply lotion at night, avoiding between toes. Need to look at feet daily and report to a physician any signs of inflammation or skin damage. Discussed diabetes shoes and socks. Diabetic management related to wound care    Smoking: Never smoker  Obesity:No  Anticoagulant therapy: No  Immunosuppression: No    Patient educated on the 6 essential components necessary for wound healing: Circulation, Debridements, Proper Dressings and Topical Wound Products, Infection Control, Edema Control and Offloading. Patient educated on those factors that negatively effect or impact wound healing: smoking, obesity, uncontrolled diabetes, anticoagulant and immunosuppressive regimens, inadequate nutrition, untreated arterial and venous disease if applicable and measures to manage edema. Nutritional status: well nourished. Discussed need for increased protein and calories for wound healing and good sources of protein (just over 7 grams for every 20 pounds of body weight). Animal-based foods high in protein (meat, poultry, fish, eggs, and dairy foods). Plant based foods high in protein (tofu, lentils, beans, chickpeas, nuts, quinoa and den seeds. Off Loading  Offloading or minimizing or removing weight placed on an area with poor circulation such as diabetic wounds or pressure. This can be achieved with crutches, wheel chair, knee walker etc. Minimizing pressure through partial weight bearing (minimizing the amount of  pressure applied and or the amount of time on the area of pressure) or maintaining a non-weight bearing status can be used to promote and often can be essential for thee wound to heal. Off loading may also need to be achieved for non-weight bearing wounds such as pressure ulcers to the torso. Turning and changing positions frequently, at least every two hours. Use of pressure cushion if sitting up in chair. Skin Care  Keep skin clean and well moisturized , moisturize routinely with ointments for heavier moisturizer needs for extremely dry skin or cracks such as A&D ointment and lotions for a light moisturizer such as CeraVe or Eucerin. If incontinent change incontinence garments as soon as soiled and keeping skin clean and use barrier cream to protect the skin.         PAST MEDICAL HISTORY        Diagnosis Date    Abscess of left foot 4/22/2022    Anemia associated with acute blood loss 4/26/2022    Callus of foot     Right foot - see's Dr. Kevin Kenney    Cellulitis of left foot 4/22/2022    Diabetic ulcer of left midfoot associated with type 2 diabetes mellitus, with muscle involvement without evidence of necrosis ([address] 4/26/2022    Diabetic ulcer of left midfoot associated with type 2 diabetes mellitus, with necrosis of muscle ([address] 6/7/2021    Diabetic ulcer of right midfoot associated with type 2 diabetes mellitus, with fat layer exposed ([address] 8/11/2020    Dizziness     positional    Essential hypertension     Follows with PCP    Hyperlipidemia     Lumbar radiculopathy     Septic embolism ([facility] 6/13/2020    Subacute osteomyelitis of left foot ([facility] 4/22/2022       PAST SURGICAL HISTORY    Past Surgical History:   Procedure Laterality Date    ACHILLES TENDON SURGERY Right 1/8/2021    RIGHT ACHILLES TENDON LENGTHENING REPAIR performed by Marina Gould DPM at [address]  03/2018    [facility]    DENTAL SURGERY      teeth extractions -half per patient    HERNIA REPAIR Bilateral 5/27/2020    BIALTERAL HERNIA INGUINAL REPAIR performed by Briana Yi MD at [address] Right 9/2/2022    LEG AMPUTATION BELOW KNEE performed by Babar Bradley MD at [address] [facility] Right 9/5/2022    LEG AMPUTATION BELOW KNEE REVISION performed by Babar Bradley MD at [facility] 2018    [place] OFFICE/OUTPT VISIT,PROCEDURE ONLY Left 4/17/2018    L5-S1 HEMILAMINECTOMY, REMOVAL OF DISC LEFT SIDE performed by Samantha Eden MD at [address] Right 1/8/2021    RIGHT TRANSMETATARSAL TOE AMPUTATION performed by Marina Gould DPM at [address] Left 4/23/2022    LEFT RAY GREAT TOE AMPUTATION performed by Luci Blankenship MD at [address]    Family History   Problem Relation Age of Onset    Heart Disease Father     Diabetes Father     Diabetes Mother     Heart Disease Mother     Other Mother     Kidney Disease Mother     Heart Attack Mother        SOCIAL HISTORY    Social History     Tobacco Use    Smoking status: Never    Smokeless tobacco: Never   Vaping Use    Vaping Use: Never used   Substance Use Topics    Alcohol use: Yes     Comment: occasionally    Drug use: No       ALLERGIES    No Known Allergies    MEDICATIONS    Current Outpatient Medications on File Prior to Encounter   Medication Sig Dispense Refill    FLUoxetine (PROZAC) 40 MG capsule Take 2 capsules by mouth once daily 180 capsule 1 carvedilol (COREG) 12.5 MG tablet Take 1 tablet by mouth 2 times daily (with meals) 180 tablet 1    metFORMIN (GLUCOPHAGE-XR) 500 MG extended release tablet Take 2 tablets by mouth 2 times daily (with meals) 120 tablet 3    insulin glargine (LANTUS SOLOSTAR) 100 UNIT/ML injection pen Inject 10 Units into the skin nightly 5 Adjustable Dose Pre-filled Pen Syringe 3    Dulaglutide 3 MG/0.5ML SOPN Inject 3 mg into the skin once a week 5 Adjustable Dose Pre-filled Pen Syringe 3    Insulin Pen Needle ([employer] PEN NEEDLES) 31G X 6 MM MISC 1 each by Does not apply route daily 100 each 3    amLODIPine (NORVASC) 2.5 MG tablet Take 1 tablet by mouth daily 30 tablet 5    prazosin (MINIPRESS) 1 MG capsule Take 1 capsule by mouth nightly 30 capsule 5    QUEtiapine (SEROQUEL) 200 MG tablet Take 1 tablet by mouth 2 times daily 60 tablet 5    losartan (COZAAR) 100 MG tablet Take 1 tablet by mouth daily 30 tablet 5    clopidogrel (PLAVIX) 75 MG tablet Take 1 tablet by mouth daily 30 tablet 5    atorvastatin (LIPITOR) 40 MG tablet Take 1 tablet by mouth nightly 30 tablet 5    ondansetron (ZOFRAN) 4 MG tablet Take 1 tablet by mouth daily as needed for Nausea or Vomiting 30 tablet 0    Blood Pressure KIT 1 kit by Does not apply route daily 1 kit 0    pantoprazole (PROTONIX) 40 MG tablet Take 1 tablet by mouth every morning (before breakfast) 30 tablet 5    aspirin 81 MG chewable tablet Take 1 tablet by mouth daily Take 81 mg by mouth daily 30 tablet 2    hydrOXYzine pamoate (VISTARIL) 50 MG capsule Take 50 mg by mouth every 6 hours as needed for Anxiety      docusate sodium (COLACE, DULCOLAX) 100 MG CAPS Take 100 mg by mouth daily      [DISCONTINUED] metoclopramide (REGLAN) 10 MG tablet Take 1 tablet by mouth 4 times daily (before meals and nightly) 120 tablet 3    Alcohol Swabs PADS       blood glucose monitor strips Test 2 times a day & as needed for symptoms of irregular blood glucose.  Dispense sufficient amount for indicated testing frequency plus additional to accommodate PRN testing needs. 100 strip 0    [DISCONTINUED] pioglitazone (ACTOS) 30 MG tablet Take 1 tablet by mouth daily 90 tablet 1    FreeStyle Lancets MISC 1 each by Does not apply route daily 100 each 3    [DISCONTINUED] acetaminophen (TYLENOL) 325 MG tablet Take 2 tablets by mouth every 4 hours as needed for Pain or Fever 120 tablet 3     No current facility-administered medications on file prior to encounter. REVIEW OF SYSTEMS    Pertinent items are noted in HPI. Constitutional: Negative for systemic symptoms including fever, chills and malaise. Objective:      BP (!) 146/84   Pulse 92   Temp 98.1 °F (36.7 °C) (Temporal)   Resp 18     PHYSICAL EXAM      General: The patient is in no acute distress. Mental status:  Patient is appropriate, is  oriented to place and plan of care. Dermatologic exam: Visual inspection of the periwound reveals the skin to be normal in turgor and texture  Wound exam: see wound description below in procedure note      Assessment:     Problem List Items Addressed This Visit          Endocrine    WD-Diabetic ulcer of left midfoot associated with type 2 diabetes mellitus, with fat layer exposed ([address] - Primary    Relevant Orders    Initiate Outpatient Wound Care Protocol    Diabetic ulcer of toe of left foot associated with type 2 diabetes mellitus, with fat layer exposed ([address]    Relevant Orders    Initiate Outpatient Wound Care Protocol       Other    Cellulitis of left foot    Relevant Orders    Initiate Outpatient Wound Care Protocol    WD-Partial nontraumatic amputation of foot, left [facility])    Relevant Orders    Initiate Outpatient Wound Care Protocol     Procedure Note    Indications:  Based on my examination of this patient's wound(s) today, sharp excision into necrotic subcutaneous tissue is required to promote healing and evaluate the extent of previous healing.     Performed by: Evelyn Bryan, APRN - CNP    Consent obtained: Yes    Time out taken:  Yes    Pain Control: Anesthetic  Anesthetic:       Debridement:Excisional Debridement    Using curette the wound(s) was/were sharply debrided down through and including the removal of subcutaneous      Devitalized Tissue Debrided:  fibrin, biofilm, slough, necrotic/eschar, and exudate    Pre Debridement Measurements:  Are located in the Wound Documentation Flow Sheet    All active wounds listed below with today's date are evaluated  Wound(s)    debrided this date include # : 3    Post  Debridement Measurements:  Wound 10/20/22 #3 right amp site.  (Active)   Wound Image   02/21/23 0857   Wound Etiology Non-Healing Surgical 02/21/23 0857   Dressing Status New dressing applied 02/21/23 0913   Wound Cleansed Wound cleanser 02/21/23 0857   Dressing/Treatment Other (comment) 02/07/23 1415   Offloading for Diabetic Foot Ulcers Other (comment) 02/21/23 0857   Wound Length (cm) 0.2 cm 02/21/23 0857   Wound Width (cm) 0.2 cm 02/21/23 0857   Wound Depth (cm) 0.2 cm 02/21/23 0857   Wound Surface Area (cm^2) 0.04 cm^2 02/21/23 0857   Change in Wound Size % (l*w) 99.29 02/21/23 0857   Wound Volume (cm^3) 0.008 cm^3 02/21/23 0857   Wound Healing % 100 02/21/23 0857   Post-Procedure Length (cm) 0.2 cm 02/21/23 0913   Post-Procedure Width (cm) 0.2 cm 02/21/23 0913   Post-Procedure Depth (cm) 0.2 cm 02/21/23 0913   Post-Procedure Surface Area (cm^2) 0.04 cm^2 02/21/23 0913   Post-Procedure Volume (cm^3) 0.008 cm^3 02/21/23 0913   Distance Tunneling (cm) 0 cm 02/21/23 0857   Tunneling Position ___ O'Clock 0 02/21/23 0857   Undermining Starts ___ O'Clock 0 02/21/23 0857   Undermining Ends___ O'Clock 0 02/21/23 0857   Undermining Maxium Distance (cm) 0 02/21/23 0857   Wound Assessment Pink/red;Slough 02/21/23 0857   Drainage Amount Moderate 02/21/23 0857   Drainage Description Rose Marie Mayfatuma 02/21/23 0857   Odor None 02/21/23 0857   Rosalie-wound Assessment Blanchable erythema 02/21/23 0857   Margins Defined edges 02/21/23 6880   Wound Thickness Description not for Pressure Injury Full thickness 02/21/23 0857   Number of days: 124       Total  Area  Debrided: 0.04 sq cm     Bleeding:  Minimal    Hemostasis Achieved:  by pressure    Procedural Pain:  0  / 10     Post Procedural Pain:  0 / 10     Response to treatment:  Well tolerated by patient. Status of wound progress and description from last visit: BKA site improving, regimen as below, follow up in one week.  rep with him today and a stump  placed. Abscess left shoulder/neck with I&D remains healed. The patient continues to orellana depression post his mother's death. Plan:       Discharge Instructions         PHYSICIAN ORDERS AND DISCHARGE INSTRUCTIONS     NOTE: Upon discharge from the [address], you will receive a patient experience survey. We would be grateful if you would take the time to fill this survey out. Wound cleansing:              Do not scrub or use excessive force. Wash hands with soap and water before and after dressing changes. Prior to applying a clean dressing, cleanse wound with normal saline, wound cleanser, or mild soap and water. Ask the physician or nurse before getting the wound(s) wet in a shower     Daily Wound management:              Keep weight off wounds and reposition every 2 hours. Avoid standing for long periods of time. Apply wraps/stockings in AM and remove at bedtime. If swelling is present, elevate legs to the level of the heart or above for 30 minutes 4-5 times a day and/or when sitting. When taking antibiotics take entire prescription as ordered by physician do not stop taking until medicine is all gone.                               Wound Care Notes:  Rx: Reyna Murray  Apply for grafts 05/11/22: aLL GRAFTS approved 06/08/22 for Left Toe amp site  Reapply for graft 08/22/22 for Left toes amp site YADY's   Right 1.07      Left    1.1           Date: 08/15/22   Home Care Discontinued. Needs Nurse visit         Grafts Left Foot Amp Site  Puraply #1 Graft #1 4x4 fenestrated 06/15/22   Puraply #2 Graft #2 4x4 fenestrated 06/22/22                                    Orders for this week: 2/21/23      Left Plantar Foot wound -- Clean with soap and water, pat dry. Apply A&D  Wrap with Conform and Coban  Leave in place for 1 week. ((((PLEASE DO NOT REMOVE THE LEFT LEG DRESSING))))        Right amp site - Apply Anasept gel and Shwetha to wound bed. Cover with Silcone border. Wear stump . Leave dressing in place for 1 week      Wound vac discontinued 2/14/23, may return to [facility]        Follow Up Instructions: At the [address] in 1 week: Tuesday   Primary Wound Care Provider: Lani Torres CNP   Call  for any questions or concerns. Central Scheduling: [phone]        Treatment Note Wound 10/20/22 #3 right amp site. -Dressing/Treatment:  (Nida Galindo)    Written Patient Dismissal Instructions Given            Electronically signed by RAFAEL Lee CNP on 2/21/2023 at 4:23 PM

## 2023-02-24 ENCOUNTER — CARE COORDINATION (OUTPATIENT)
Dept: CARE COORDINATION | Age: 43
End: 2023-02-24

## 2023-02-24 NOTE — CARE COORDINATION
Attempted phone call to Pt to follow up regarding housing / AL waiver assessment. No answer, voicemail message left requesting return call, contact number provided. KOLE plan of care:  SW will follow up with Pt regarding housing / AL waiver on 3/3.

## 2023-03-02 ENCOUNTER — CARE COORDINATION (OUTPATIENT)
Dept: CARE COORDINATION | Age: 43
End: 2023-03-02

## 2023-03-02 NOTE — CARE COORDINATION
KOLE mailed out affordable housing application to pt at request of KOLE Del Castillo.
Statement Selected

## 2023-03-03 ENCOUNTER — CARE COORDINATION (OUTPATIENT)
Dept: CARE COORDINATION | Age: 43
End: 2023-03-03

## 2023-03-03 DIAGNOSIS — R11.2 INTRACTABLE VOMITING WITH NAUSEA: ICD-10-CM

## 2023-03-03 DIAGNOSIS — F33.2 SEVERE EPISODE OF RECURRENT MAJOR DEPRESSIVE DISORDER, WITHOUT PSYCHOTIC FEATURES (HCC): ICD-10-CM

## 2023-03-03 RX ORDER — FLUOXETINE HYDROCHLORIDE 40 MG/1
CAPSULE ORAL
Qty: 180 CAPSULE | Refills: 1 | Status: SHIPPED | OUTPATIENT
Start: 2023-03-03

## 2023-03-03 RX ORDER — ONDANSETRON 4 MG/1
4 TABLET, FILM COATED ORAL DAILY PRN
Qty: 30 TABLET | Refills: 0 | Status: SHIPPED | OUTPATIENT
Start: 2023-03-03

## 2023-03-03 NOTE — CARE COORDINATION
Attempted phone call to Pt to follow up regarding AL assessment and transportation. No answer, voicemail message left requesting return call, contact number provided. KOLE plan of care:  SW will follow up with Pt on 3/7 regarding AL and transportation. SW will follow up with Pt the following week to confirm he received the application for apartment which was mailed out on 3/2.

## 2023-03-06 ENCOUNTER — CARE COORDINATION (OUTPATIENT)
Dept: CARE COORDINATION | Age: 43
End: 2023-03-06

## 2023-03-06 NOTE — CARE COORDINATION
SW turned work cell phone on this morning and had several text messages from Pt exhibiting inappropriate boundaries. Attempted phone call to Pt to address the messages Pt left and to confirm if he has heard from [facility] regarding AL waiver. No answer, voicemail message left requesting return call. SW updated Jewels Barlow regarding text. Sent secure email with details of text messages to Supervisor, Bettina Mackay to review. Bettina Mackay will review with her Supervisor, Erick Sharma and will notify this SW of the plan on how to proceed. This SW will no longer be assigned to Pt. New assigned  is Brendon Elder. Consulted with Timoteo Khan regarding efforts to assist Pt in obtaining low income housing and referral has been made to [address] [facility] through [facility], trying to reach Pt to confirm assessment has been scheduled.

## 2023-03-07 ENCOUNTER — HOSPITAL ENCOUNTER (OUTPATIENT)
Dept: WOUND CARE | Age: 43
Discharge: HOME | End: 2023-03-07
Payer: MEDICARE

## 2023-03-07 ENCOUNTER — CARE COORDINATION (OUTPATIENT)
Dept: CARE COORDINATION | Age: 43
End: 2023-03-07

## 2023-03-07 VITALS
SYSTOLIC BLOOD PRESSURE: 143 MMHG | DIASTOLIC BLOOD PRESSURE: 83 MMHG | TEMPERATURE: 96.9 F | RESPIRATION RATE: 18 BRPM | HEART RATE: 94 BPM

## 2023-03-07 DIAGNOSIS — E11.621 DIABETIC ULCER OF LEFT MIDFOOT ASSOCIATED WITH TYPE 2 DIABETES MELLITUS, WITH FAT LAYER EXPOSED (HCC): Primary | ICD-10-CM

## 2023-03-07 DIAGNOSIS — L97.422 DIABETIC ULCER OF LEFT MIDFOOT ASSOCIATED WITH TYPE 2 DIABETES MELLITUS, WITH FAT LAYER EXPOSED (HCC): Primary | ICD-10-CM

## 2023-03-07 DIAGNOSIS — L97.522 DIABETIC ULCER OF TOE OF LEFT FOOT ASSOCIATED WITH TYPE 2 DIABETES MELLITUS, WITH FAT LAYER EXPOSED (HCC): ICD-10-CM

## 2023-03-07 DIAGNOSIS — L03.116 CELLULITIS OF LEFT FOOT: ICD-10-CM

## 2023-03-07 DIAGNOSIS — Z89.432 PARTIAL NONTRAUMATIC AMPUTATION OF FOOT, LEFT (HCC): ICD-10-CM

## 2023-03-07 DIAGNOSIS — E11.621 DIABETIC ULCER OF TOE OF LEFT FOOT ASSOCIATED WITH TYPE 2 DIABETES MELLITUS, WITH FAT LAYER EXPOSED (HCC): ICD-10-CM

## 2023-03-07 PROCEDURE — 11042 DBRDMT SUBQ TIS 1ST 20SQCM/<: CPT

## 2023-03-07 PROCEDURE — 11042 DBRDMT SUBQ TIS 1ST 20SQCM/<: CPT | Performed by: NURSE PRACTITIONER

## 2023-03-07 RX ORDER — LIDOCAINE HYDROCHLORIDE 40 MG/ML
SOLUTION TOPICAL ONCE
OUTPATIENT
Start: 2023-03-07 | End: 2023-03-07

## 2023-03-07 RX ORDER — LIDOCAINE 50 MG/G
OINTMENT TOPICAL ONCE
OUTPATIENT
Start: 2023-03-07 | End: 2023-03-07

## 2023-03-07 RX ORDER — CLOBETASOL PROPIONATE 0.5 MG/G
OINTMENT TOPICAL ONCE
OUTPATIENT
Start: 2023-03-07 | End: 2023-03-07

## 2023-03-07 RX ORDER — BACITRACIN, NEOMYCIN, POLYMYXIN B 400; 3.5; 5 [USP'U]/G; MG/G; [USP'U]/G
OINTMENT TOPICAL ONCE
OUTPATIENT
Start: 2023-03-07 | End: 2023-03-07

## 2023-03-07 RX ORDER — GENTAMICIN SULFATE 1 MG/G
OINTMENT TOPICAL ONCE
OUTPATIENT
Start: 2023-03-07 | End: 2023-03-07

## 2023-03-07 RX ORDER — BETAMETHASONE DIPROPIONATE 0.05 %
OINTMENT (GRAM) TOPICAL ONCE
OUTPATIENT
Start: 2023-03-07 | End: 2023-03-07

## 2023-03-07 RX ORDER — GINSENG 100 MG
CAPSULE ORAL ONCE
OUTPATIENT
Start: 2023-03-07 | End: 2023-03-07

## 2023-03-07 RX ORDER — LIDOCAINE HYDROCHLORIDE 20 MG/ML
JELLY TOPICAL ONCE
OUTPATIENT
Start: 2023-03-07 | End: 2023-03-07

## 2023-03-07 RX ORDER — LIDOCAINE 40 MG/G
CREAM TOPICAL ONCE
OUTPATIENT
Start: 2023-03-07 | End: 2023-03-07

## 2023-03-07 RX ORDER — BACITRACIN ZINC AND POLYMYXIN B SULFATE 500; 1000 [USP'U]/G; [USP'U]/G
OINTMENT TOPICAL ONCE
OUTPATIENT
Start: 2023-03-07 | End: 2023-03-07

## 2023-03-07 ASSESSMENT — PAIN SCALES - GENERAL: PAINLEVEL_OUTOF10: 0

## 2023-03-07 NOTE — DISCHARGE INSTRUCTIONS
PHYSICIAN ORDERS AND DISCHARGE INSTRUCTIONS     NOTE: Upon discharge from the [address], you will receive a patient experience survey. We would be grateful if you would take the time to fill this survey out. Wound cleansing:              Do not scrub or use excessive force. Wash hands with soap and water before and after dressing changes. Prior to applying a clean dressing, cleanse wound with normal saline, wound cleanser, or mild soap and water. Ask the physician or nurse before getting the wound(s) wet in a shower     Daily Wound management:              Keep weight off wounds and reposition every 2 hours. Avoid standing for long periods of time. Apply wraps/stockings in AM and remove at bedtime. If swelling is present, elevate legs to the level of the heart or above for 30 minutes 4-5 times a day and/or when sitting. When taking antibiotics take entire prescription as ordered by physician do not stop taking until medicine is all gone. Wound Care Notes:  Rx: Ramona Merritt  Apply for grafts 05/11/22: aLL GRAFTS approved 06/08/22 for Left Toe amp site  Reapply for graft 08/22/22 for Left toes amp site   YADY's   Right 1.07      Left    1.1           Date: 08/15/22   Home Care Discontinued. Needs Nurse visit         Grafts Left Foot Amp Site  Puraply #1 Graft #1 4x4 fenestrated 06/15/22   Puraply #2 Graft #2 4x4 fenestrated 06/22/22                                    Orders for this week: 03/07/23         Right amp site - Apply Anasept gel and Endoform to wound bed. Cover with Versatel (Applied by [email or web address])  Cover with two Ca Alginate  Wrap with Coban 2 lite to mid-thigh. Leave dressing in place for 1 week      Wound vac discontinued 2/14/23, may return to [facility]        Follow Up Instructions:  At the [address] in 1 week: Tuesday   Primary Wound Care Provider: Yolande Lau, CNP   Call  for any questions or concerns.   Central [phone]

## 2023-03-07 NOTE — CARE COORDINATION
SW called pt,  left a vm introducing self and my role, along with contact information. Inform him that from this point on I will be following him to address needs and concerns.  Advised pt to return my call between the hours of 8am - 4pm. CONNIE

## 2023-03-07 NOTE — PROGRESS NOTES
Wound Care Center Progress Note With Procedure    Hu Woodall  AGE: 43 y.o. GENDER: male  : 1980  EPISODE DATE:  3/7/2023     Subjective:       CHIEF COMPLAINT  WOUND   Chief Complaint   Patient presents with    Wound Check         HISTORY of PRESENT ILLNESS      Hu Woodall is a 43 y.o. male who presents today for wound evaluation of Chronic diabetic, pressure and non-healing surgical ulcer(s) of the left medial foot and lateral plantar surface. The ulcer is of marked severity. The underlying cause of the wound is diabetes, non-healing surgical. He presents today with a new wound to the right plantar foot that is diabetic and pressure in nature and of moderate severity. The patient has significant underlying medical conditions as below. The patient is having significant depression related to the recent and sudden death of his mother. 23: Patient has an abscess left shoulder/neck area. The patient was seen by [address] [place] [address] [facility] for a possible spider bite was referred to surgical consult and was to start Keflex and Bactrim. He then went to the ED 23 with I&D abscess. Today the abscess has not been completely evacuated with repeat I&D as below. 10/20/22: The patient was here last 10/4/22 and his right BKA site was well approximated, staples removed and steri strips. The prosthetic supplier was with him and plans had started to get fitted. Unfortunately, the patient fell the same night at home and the wound dehisced. He was evaluated at the ED. The patient then was not seen at the wound clinic for a few weeks as he was hospitalized for suicidal ideation. Today able to probe and visibly see bone. Home Health still in place, will apply for a wound vac. Will also start Bactrim. 22: Since his last visit to the wound clinic 22 the patient was hospitalized -22) at [facility] with necrotizing fascitis right lower leg with resulting right BKA. 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 Carol Minor.  She placed a wound VAC to on the stump at that time.  On 9/5/2022 Dr. Minor performed a primary closure of his right BKA stump.    Wound Pain Timing/Severity: none  Quality of pain: N/A  Severity of pain:  0 / 10   Modifying Factors: diabetes and chronic pressure  Associated Signs/Symptoms: drainage     Diabetes: Yes, on an oral and insulin regimen  Hemoglobin A1C   Date Value Ref Range Status   01/11/2023 7.6 See comment % Final     Comment:     Comment:  Diagnosis of Diabetes: > or = 6.5%  Increased risk of diabetes (Prediabetes): 5.7-6.4%  Glycemic Control: Nonpregnant Adults: <7.0%                    Pregnant: <6.0%          Diabetes education provided today:    Diabetes pathoetiology, difference between type 1 and type 2 diabetes, and progressive nature of Type 2 DM.  Diabetic Neuropathy: signs and therapy.  Foot care: advised to wash feet daily, pat dry and apply lotion at night, avoiding between toes. Need to look at feet daily and report to a physician any signs of inflammation or skin damage. Discussed diabetes shoes and socks.  Diabetic management related to wound care    Smoking: Never smoker  Obesity:No  Anticoagulant therapy: No  Immunosuppression: No    Patient educated on the 6 essential components necessary for wound healing: Circulation, Debridements, Proper Dressings and Topical Wound Products, Infection Control, Edema Control and Offloading.    Patient educated on those factors that negatively effect or impact wound healing: smoking, obesity, uncontrolled diabetes, anticoagulant and immunosuppressive regimens, inadequate nutrition, untreated arterial and venous disease if applicable and measures to manage edema.     Nutritional status: well nourished. Discussed need for increased protein and calories for wound healing and good sources of protein (just over 7 grams for every 20 pounds of body weight).  Animal-based foods high in protein (meat, poultry, fish, eggs, and dairy foods). Plant based foods high in protein (tofu, lentils, beans, chickpeas, nuts, quinoa and den seeds. Off Loading  Offloading or minimizing or removing weight placed on an area with poor circulation such as diabetic wounds or pressure. This can be achieved with crutches, wheel chair, knee walker etc. Minimizing pressure through partial weight bearing (minimizing the amount of  pressure applied and or the amount of time on the area of pressure) or maintaining a non-weight bearing status can be used to promote and often can be essential for thee wound to heal. Off loading may also need to be achieved for non-weight bearing wounds such as pressure ulcers to the torso. Turning and changing positions frequently, at least every two hours. Use of pressure cushion if sitting up in chair. Skin Care  Keep skin clean and well moisturized , moisturize routinely with ointments for heavier moisturizer needs for extremely dry skin or cracks such as A&D ointment and lotions for a light moisturizer such as CeraVe or Eucerin. If incontinent change incontinence garments as soon as soiled and keeping skin clean and use barrier cream to protect the skin.         PAST MEDICAL HISTORY        Diagnosis Date    Abscess of left foot 4/22/2022    Anemia associated with acute blood loss 4/26/2022    Callus of foot     Right foot - see's Dr. Dulce Bradley    Cellulitis of left foot 4/22/2022    Diabetic ulcer of left midfoot associated with type 2 diabetes mellitus, with muscle involvement without evidence of necrosis ([address] 4/26/2022    Diabetic ulcer of left midfoot associated with type 2 diabetes mellitus, with necrosis of muscle ([address] 6/7/2021    Diabetic ulcer of right midfoot associated with type 2 diabetes mellitus, with fat layer exposed ([address] 8/11/2020    Dizziness     positional    Essential hypertension     Follows with PCP    Hyperlipidemia     Lumbar radiculopathy     Septic embolism ([facility] 6/13/2020    Subacute osteomyelitis of left foot ([facility] 4/22/2022       PAST SURGICAL HISTORY    Past Surgical History:   Procedure Laterality Date    ACHILLES TENDON SURGERY Right 1/8/2021    RIGHT ACHILLES TENDON LENGTHENING REPAIR performed by Vish Sutherland DPM at [address]  03/2018    [facility][1]    DENTAL SURGERY      teeth extractions -half per patient    HERNIA REPAIR Bilateral 5/27/2020    BIALTERAL HERNIA INGUINAL REPAIR performed by Jermain Ruth MD at [address] Right 9/2/2022    LEG AMPUTATION BELOW KNEE performed by Timm Rinne, MD at [address] Right 9/5/2022    LEG AMPUTATION BELOW KNEE REVISION performed by Timm Rinne, MD at [facility] 2018    TX OFFICE/OUTPT VISIT,PROCEDURE ONLY Left 4/17/2018    L5-S1 HEMILAMINECTOMY, REMOVAL OF DISC LEFT SIDE performed by Johnson Reeder MD at [address] Right 1/8/2021    RIGHT TRANSMETATARSAL TOE AMPUTATION performed by Vish Sutherland DPM at [address] Left 4/23/2022    LEFT RAY GREAT TOE AMPUTATION performed by Fitz Maxwell MD at [address]    Family History   Problem Relation Age of Onset    Heart Disease Father     Diabetes Father     Diabetes Mother     Heart Disease Mother     Other Mother     Kidney Disease Mother     Heart Attack Mother        SOCIAL HISTORY    Social History     Tobacco Use    Smoking status: Never    Smokeless tobacco: Never   Vaping Use    Vaping Use: Never used   Substance Use Topics    Alcohol use: Yes     Comment: occasionally    Drug use: No       ALLERGIES    No Known Allergies    MEDICATIONS    Current Outpatient Medications on File Prior to Encounter   Medication Sig Dispense Refill    ondansetron (ZOFRAN) 4 MG tablet Take 1 tablet by mouth daily as needed for Nausea or Vomiting 30 tablet 0    FLUoxetine (PROZAC) 40 MG capsule Take 2 capsules by mouth once daily 180 capsule 1    carvedilol (COREG) 12.5 MG tablet Take 1 tablet by mouth 2 times daily (with meals) 180 tablet 1    metFORMIN (GLUCOPHAGE-XR) 500 MG extended release tablet Take 2 tablets by mouth 2 times daily (with meals) 120 tablet 3    insulin glargine (LANTUS SOLOSTAR) 100 UNIT/ML injection pen Inject 10 Units into the skin nightly 5 Adjustable Dose Pre-filled Pen Syringe 3    Dulaglutide 3 MG/0.5ML SOPN Inject 3 mg into the skin once a week 5 Adjustable Dose Pre-filled Pen Syringe 3    Insulin Pen Needle ([employer] PEN NEEDLES) 31G X 6 MM MISC 1 each by Does not apply route daily 100 each 3    amLODIPine (NORVASC) 2.5 MG tablet Take 1 tablet by mouth daily 30 tablet 5    prazosin (MINIPRESS) 1 MG capsule Take 1 capsule by mouth nightly 30 capsule 5    QUEtiapine (SEROQUEL) 200 MG tablet Take 1 tablet by mouth 2 times daily 60 tablet 5    losartan (COZAAR) 100 MG tablet Take 1 tablet by mouth daily 30 tablet 5    clopidogrel (PLAVIX) 75 MG tablet Take 1 tablet by mouth daily 30 tablet 5    atorvastatin (LIPITOR) 40 MG tablet Take 1 tablet by mouth nightly 30 tablet 5    Blood Pressure KIT 1 kit by Does not apply route daily 1 kit 0    pantoprazole (PROTONIX) 40 MG tablet Take 1 tablet by mouth every morning (before breakfast) 30 tablet 5    aspirin 81 MG chewable tablet Take 1 tablet by mouth daily Take 81 mg by mouth daily 30 tablet 2    hydrOXYzine pamoate (VISTARIL) 50 MG capsule Take 50 mg by mouth every 6 hours as needed for Anxiety      docusate sodium (COLACE, DULCOLAX) 100 MG CAPS Take 100 mg by mouth daily      [DISCONTINUED] metoclopramide (REGLAN) 10 MG tablet Take 1 tablet by mouth 4 times daily (before meals and nightly) 120 tablet 3    Alcohol Swabs PADS       blood glucose monitor strips Test 2 times a day & as needed for symptoms of irregular blood glucose.  Dispense sufficient amount for indicated testing frequency plus additional to accommodate PRN testing needs. 100 strip 0    [DISCONTINUED] pioglitazone (ACTOS) 30 MG tablet Take 1 tablet by mouth daily 90 tablet 1    FreeStyle Lancets MISC 1 each by Does not apply route daily 100 each 3    [DISCONTINUED] acetaminophen (TYLENOL) 325 MG tablet Take 2 tablets by mouth every 4 hours as needed for Pain or Fever 120 tablet 3     No current facility-administered medications on file prior to encounter. REVIEW OF SYSTEMS    Pertinent items are noted in HPI. Constitutional: Negative for systemic symptoms including fever, chills and malaise. Objective:      BP (!) 143/83   Pulse 94   Temp 96.9 °F (36.1 °C) (Temporal)   Resp 18     PHYSICAL EXAM      General: The patient is in no acute distress. Mental status:  Patient is appropriate, is  oriented to place and plan of care. Dermatologic exam: Visual inspection of the periwound reveals the skin to be normal in turgor and texture  Wound exam: see wound description below in procedure note      Assessment:     Problem List Items Addressed This Visit          Endocrine    WD-Diabetic ulcer of left midfoot associated with type 2 diabetes mellitus, with fat layer exposed ([address] - Primary    Relevant Orders    Initiate Outpatient Wound Care Protocol    Diabetic ulcer of toe of left foot associated with type 2 diabetes mellitus, with fat layer exposed ([address]    Relevant Orders    Initiate Outpatient Wound Care Protocol       Other    Cellulitis of left foot    Relevant Orders    Initiate Outpatient Wound Care Protocol    WD-Partial nontraumatic amputation of foot, left [facility])    Relevant Orders    Initiate Outpatient Wound Care Protocol     Procedure Note    Indications:  Based on my examination of this patient's wound(s) today, sharp excision into necrotic subcutaneous tissue is required to promote healing and evaluate the extent of previous healing.     Performed by: RAFAEL Roberts - CNP    Consent obtained: Yes    Time out taken:  Yes    Pain Control: Anesthetic  Anesthetic:       Debridement:Excisional Debridement    Using curette the wound(s) was/were sharply debrided down through and including the removal of subcutaneous      Devitalized Tissue Debrided:  fibrin, biofilm, slough, necrotic/eschar, and exudate    Pre Debridement Measurements:  Are located in the Wound Documentation Flow Sheet    All active wounds listed below with today's date are evaluated  Wound(s)    debrided this date include # : 3    Post  Debridement Measurements:  Wound 10/20/22 #3 right amp site. (Active)   Wound Image   03/07/23 0902   Wound Etiology Non-Healing Surgical 03/07/23 0930   Dressing Status New dressing applied 03/07/23 0930   Wound Cleansed Wound cleanser 03/07/23 0902   Dressing/Treatment Other (comment) 02/07/23 1415   Offloading for Diabetic Foot Ulcers Other (comment) 03/07/23 0930   Wound Length (cm) 1 cm 03/07/23 0902   Wound Width (cm) 4.4 cm 03/07/23 0902   Wound Depth (cm) 0.2 cm 03/07/23 0902   Wound Surface Area (cm^2) 4.4 cm^2 03/07/23 0902   Change in Wound Size % (l*w) 21.43 03/07/23 0902   Wound Volume (cm^3) 0.88 cm^3 03/07/23 0902   Wound Healing % 61 03/07/23 0902   Post-Procedure Length (cm) 1 cm 03/07/23 0920   Post-Procedure Width (cm) 4.4 cm 03/07/23 0920   Post-Procedure Depth (cm) 0.2 cm 03/07/23 0920   Post-Procedure Surface Area (cm^2) 4.4 cm^2 03/07/23 0920   Post-Procedure Volume (cm^3) 0.88 cm^3 03/07/23 0920   Distance Tunneling (cm) 0 cm 03/07/23 0902   Tunneling Position ___ O'Clock 0 03/07/23 0902   Undermining Starts ___ O'Clock 0 03/07/23 0902   Undermining Ends___ O'Clock 0 03/07/23 0902   Undermining Maxium Distance (cm) 0 03/07/23 0902   Wound Assessment Pink/red;Slough 03/07/23 0902   Drainage Amount Moderate 03/07/23 0902   Drainage Description Serosanguinous; Yellow 03/07/23 0902   Odor None 03/07/23 0902   Rosalie-wound Assessment Blanchable erythema 03/07/23 0902   Margins Defined edges 03/07/23 0902   Wound Thickness Description not for Pressure Injury Full thickness 03/07/23 0902   Number of days: 138     Total  Area  Debrided: 4.4 sq cm     Bleeding:  Minimal    Hemostasis Achieved:  by pressure    Procedural Pain:  0  / 10     Post Procedural Pain:  0 / 10     Response to treatment:  Well tolerated by patient. Status of wound progress and description from last visit: BKA site was almost healed last week. Today he presents post fall with a larger wound to the amp site. Regimen as below, follow up in one week. Tiff parra has been working with him and he has a stump . The patient continues to orellana depression post his mother's death. Plan:       Discharge Instructions         PHYSICIAN ORDERS AND DISCHARGE INSTRUCTIONS     NOTE: Upon discharge from the [address], you will receive a patient experience survey. We would be grateful if you would take the time to fill this survey out. Wound cleansing:              Do not scrub or use excessive force. Wash hands with soap and water before and after dressing changes. Prior to applying a clean dressing, cleanse wound with normal saline, wound cleanser, or mild soap and water. Ask the physician or nurse before getting the wound(s) wet in a shower     Daily Wound management:              Keep weight off wounds and reposition every 2 hours. Avoid standing for long periods of time. Apply wraps/stockings in AM and remove at bedtime. If swelling is present, elevate legs to the level of the heart or above for 30 minutes 4-5 times a day and/or when sitting. When taking antibiotics take entire prescription as ordered by physician do not stop taking until medicine is all gone.                               Wound Care Notes:  Rx: Rhina Torres  Apply for grafts 05/11/22: aLL GRAFTS approved 06/08/22 for Left Toe amp site  Reapply for graft 08/22/22 for Left toes amp site   YADY's   Right 1.07      Left    1.1           Date: 08/15/22   Home Care Discontinued. Needs Nurse visit         Grafts Left Foot Amp Site  Puraply #1 Graft #1 4x4 fenestrated 06/15/22   Puraply #2 Graft #2 4x4 fenestrated 06/22/22                                    Orders for this week: 03/07/23         Right amp site - Apply Anasept gel and Endoform to wound bed. Cover with Versatel (Applied by [employer])  Cover with two Ca Alginate  Wrap with Coban 2 lite to mid-thigh. Leave dressing in place for 1 week      Wound vac discontinued 2/14/23, may return to [facility]        Follow Up Instructions: At the [address] in 1 week: Tuesday   Primary Wound Care Provider: Jasen Wilson CNP   Call  for any questions or concerns. Central Scheduling: [phone]        Treatment Note Wound 10/20/22 #3 right amp site. -Dressing/Treatment:  (Anasept, endoform, ca alg, coban 2 lite to thigh)    Written Patient Dismissal Instructions Given            Electronically signed by RAFAEL Ansari CNP on 3/7/2023 at 1:42 PM

## 2023-03-07 NOTE — PROGRESS NOTES
Multilayer Compression Wrap   (Not Unna) Below the Knee    NAME:  Cynthia Torres OF BIRTH:  1980  MEDICAL RECORD NUMBER:  [id number]  DATE:  3/7/2023    Multilayer compression wrap: Removed old Multilayer wrap if indicated and wash leg with mild soap/water. Applied moisturizing agent to dry skin as needed. Applied primary and secondary dressing as ordered. Applied multilayered dressing below the knee to right lower leg. Instructed patient/caregiver not to remove dressing and to keep it clean and dry. Instructed patient/caregiver on complications to report to provider, such as pain, numbness in toes, heavy drainage, and slippage of dressing. Instructed patient on purpose of compression dressing and on activity and exercise recommendations.       Electronically signed by Jesica Stout LPN on 6/7/6733 at 2:39 AM

## 2023-03-10 ENCOUNTER — CARE COORDINATION (OUTPATIENT)
Dept: CARE COORDINATION | Age: 43
End: 2023-03-10

## 2023-03-10 NOTE — CARE COORDINATION
KOLE called pt to inquire if he has heard back from [place] or [facility] with Albert Cisneros. Pt unavailable at the time of my call and  was left requesting a call back to [phone].

## 2023-03-13 ENCOUNTER — CARE COORDINATION (OUTPATIENT)
Dept: CARE COORDINATION | Age: 43
End: 2023-03-13

## 2023-03-14 ENCOUNTER — CARE COORDINATION (OUTPATIENT)
Dept: CARE COORDINATION | Age: 43
End: 2023-03-14

## 2023-03-14 NOTE — CARE COORDINATION
KOLE called pt to follow up with needs/concerns. Pt unavailable at the time of my call and vm was left inquiring about AL or [facility] referral. Advised pt to return my call to [phone].

## 2023-03-21 ENCOUNTER — HOSPITAL ENCOUNTER (OUTPATIENT)
Dept: WOUND CARE | Age: 43
Discharge: HOME | End: 2023-03-21
Payer: MEDICARE

## 2023-03-21 VITALS — SYSTOLIC BLOOD PRESSURE: 180 MMHG | DIASTOLIC BLOOD PRESSURE: 110 MMHG | HEART RATE: 84 BPM | TEMPERATURE: 98.2 F

## 2023-03-21 DIAGNOSIS — Z89.511 S/P BKA (BELOW KNEE AMPUTATION), RIGHT (HCC): ICD-10-CM

## 2023-03-21 DIAGNOSIS — T87.81 DEHISCENCE OF AMPUTATION STUMP OF RIGHT LOWER EXTREMITY (HCC): ICD-10-CM

## 2023-03-21 DIAGNOSIS — G54.6 PHANTOM PAIN AFTER AMPUTATION OF LOWER EXTREMITY (HCC): ICD-10-CM

## 2023-03-21 DIAGNOSIS — Z47.81 ENCOUNTER FOR ORTHOPEDIC AFTERCARE FOLLOWING SURGICAL AMPUTATION: ICD-10-CM

## 2023-03-21 DIAGNOSIS — S88.119A BELOW-KNEE AMPUTATION (HCC): ICD-10-CM

## 2023-03-21 DIAGNOSIS — E11.10 TYPE 2 DIABETES MELLITUS WITH KETOACIDOSIS WITHOUT COMA, WITH LONG-TERM CURRENT USE OF INSULIN (HCC): Primary | ICD-10-CM

## 2023-03-21 DIAGNOSIS — Z79.4 TYPE 2 DIABETES MELLITUS WITH KETOACIDOSIS WITHOUT COMA, WITH LONG-TERM CURRENT USE OF INSULIN (HCC): Primary | ICD-10-CM

## 2023-03-21 PROBLEM — Z89.512 STATUS POST BELOW KNEE AMPUTATION, LEFT (HCC): Status: RESOLVED | Noted: 2022-09-10 | Resolved: 2023-03-21

## 2023-03-21 PROCEDURE — 11042 DBRDMT SUBQ TIS 1ST 20SQCM/<: CPT | Performed by: NURSE PRACTITIONER

## 2023-03-21 PROCEDURE — 11042 DBRDMT SUBQ TIS 1ST 20SQCM/<: CPT

## 2023-03-21 ASSESSMENT — PAIN SCALES - GENERAL: PAINLEVEL_OUTOF10: 5

## 2023-03-21 NOTE — DISCHARGE INSTRUCTIONS
PHYSICIAN ORDERS AND DISCHARGE INSTRUCTIONS     NOTE: Upon discharge from the [address], you will receive a patient experience survey. We would be grateful if you would take the time to fill this survey out. Wound cleansing:              Do not scrub or use excessive force. Wash hands with soap and water before and after dressing changes. Prior to applying a clean dressing, cleanse wound with normal saline, wound cleanser, or mild soap and water. Ask the physician or nurse before getting the wound(s) wet in a shower     Daily Wound management:              Keep weight off wounds and reposition every 2 hours. Avoid standing for long periods of time. Apply wraps/stockings in AM and remove at bedtime. If swelling is present, elevate legs to the level of the heart or above for 30 minutes 4-5 times a day and/or when sitting. When taking antibiotics take entire prescription as ordered by physician do not stop taking until medicine is all gone. Wound Care Notes:  Rx: Raymundo Velazquez  Apply for grafts 05/11/22: aLL GRAFTS approved 06/08/22 for Left Toe amp site  Reapply for graft 08/22/22 for Left toes amp site   YADY's   Right 1.07      Left    1.1           Date: 08/15/22   Home Care Discontinued. Needs Nurse visit         Grafts Left Foot Amp Site  Puraply #1 Graft #1 4x4 fenestrated 06/15/22   Puraply #2 Graft #2 4x4 fenestrated 06/22/22                                    Orders for this week: 03/21/23         Right amp site -   Bolster wound with steri strips (Applied by Marlen Vieira 3/21/23)  Apply Shwetha to wound bed. Cover with Versatel and double Ca Alginate  Wrap with Coban 2 lite to mid-thigh. Leave dressing in place for 1 week. Wound vac discontinued 2/14/23, may return to [facility]        Follow Up Instructions:  At the [address] in 1 week: Tuesday   Primary

## 2023-03-21 NOTE — PLAN OF CARE
Problem: Chronic Conditions and Co-morbidities  Goal: Patient's chronic conditions and co-morbidity symptoms are monitored and maintained or improved  Outcome: Progressing     Problem: Wound:  Goal: Will show signs of wound healing; wound closure and no evidence of infection  Description: Will show signs of wound healing; wound closure and no evidence of infection  Outcome: Progressing How Severe Is Your Skin Lesion?: moderate Has Your Skin Lesion Been Treated?: not been treated Is This A New Presentation, Or A Follow-Up?: Skin Lesion

## 2023-03-21 NOTE — PROGRESS NOTES
radiculopathy     Septic embolism ([facility] 6/13/2020    Subacute osteomyelitis of left foot ([facility] 4/22/2022       PAST SURGICAL HISTORY    Past Surgical History:   Procedure Laterality Date    ACHILLES TENDON SURGERY Right 1/8/2021    RIGHT ACHILLES TENDON LENGTHENING REPAIR performed by Heri Delgado DPM at [address] [facility]  03/2018    [facility]    DENTAL SURGERY      teeth extractions -half per patient    HERNIA REPAIR Bilateral 5/27/2020    BIALTERAL HERNIA INGUINAL REPAIR performed by Jose Mars MD at [address] Right 9/2/2022    LEG AMPUTATION BELOW KNEE performed by Veverly Castleman, MD at [address] Right 9/5/2022    LEG AMPUTATION BELOW KNEE REVISION performed by Veverly Castleman, MD at [facility] 2018    [place] OFFICE/OUTPT VISIT,PROCEDURE ONLY Left 4/17/2018    L5-S1 HEMILAMINECTOMY, REMOVAL OF DISC LEFT SIDE performed by Karen Arevalo MD at [address] Right 1/8/2021    RIGHT TRANSMETATARSAL TOE AMPUTATION performed by Heri Delgado DPM at [address] Left 4/23/2022    LEFT RAY GREAT TOE AMPUTATION performed by Reji Alvarado MD at [address]    Family History   Problem Relation Age of Onset    Heart Disease Father     Diabetes Father     Diabetes Mother     Heart Disease Mother     Other Mother     Kidney Disease Mother     Heart Attack Mother        SOCIAL HISTORY    Social History     Tobacco Use    Smoking status: Never    Smokeless tobacco: Never   Vaping Use    Vaping Use: Never used   Substance Use Topics    Alcohol use: Yes     Comment: occasionally    Drug use: No       ALLERGIES    No Known Allergies    MEDICATIONS    Current Outpatient Medications on File Prior to Encounter   Medication Sig Dispense Refill    ondansetron (ZOFRAN) 4 MG tablet Take 1 tablet by mouth daily as needed for Nausea or
frequency plus additional to accommodate PRN testing needs. 100 strip 0    [DISCONTINUED] pioglitazone (ACTOS) 30 MG tablet Take 1 tablet by mouth daily 90 tablet 1    FreeStyle Lancets MISC 1 each by Does not apply route daily 100 each 3    [DISCONTINUED] acetaminophen (TYLENOL) 325 MG tablet Take 2 tablets by mouth every 4 hours as needed for Pain or Fever 120 tablet 3     No current facility-administered medications on file prior to encounter. REVIEW OF SYSTEMS    Pertinent items are noted in HPI. Constitutional: Negative for systemic symptoms including fever, chills and malaise. Objective:      BP (!) 180/110   Pulse 84   Temp 98.2 °F (36.8 °C) (Tympanic)     PHYSICAL EXAM      General: The patient is in no acute distress. Mental status:  Patient is appropriate, is  oriented to place and plan of care. Dermatologic exam: Visual inspection of the periwound reveals the skin to be normal in turgor and texture  Wound exam: see wound description below in procedure note      Assessment:     Problem List Items Addressed This Visit          Endocrine    Type 2 diabetes mellitus, with long-term current use of insulin ([facility] - Primary       Other    WD-Below-knee amputation ([facility]    WD-Encounter for orthopedic aftercare following surgical amputation    WD-S/P BKA (below knee amputation), right [facility])    WD-Dehiscence of amputation stump of right lower extremity ([facility] [facility]    Phantom pain after amputation of lower extremity ([facility]     Procedure Note    Indications:  Based on my examination of this patient's wound(s) today, sharp excision into necrotic subcutaneous tissue is required to promote healing and evaluate the extent of previous healing.     Performed by: RAFAEL Dozier - CNP    Consent obtained: Yes    Time out taken:  Yes    Pain Control: Anesthetic  Anesthetic:       Debridement:Excisional Debridement    Using curette the wound(s) was/were sharply debrided down through and including the removal

## 2023-03-27 ENCOUNTER — OFFICE VISIT (OUTPATIENT)
Dept: FAMILY MEDICINE CLINIC | Age: 43
End: 2023-03-27
Payer: MEDICARE

## 2023-03-27 VITALS — HEART RATE: 100 BPM | DIASTOLIC BLOOD PRESSURE: 104 MMHG | OXYGEN SATURATION: 98 % | SYSTOLIC BLOOD PRESSURE: 172 MMHG

## 2023-03-27 DIAGNOSIS — G54.6 PHANTOM PAIN AFTER AMPUTATION OF LOWER EXTREMITY (HCC): ICD-10-CM

## 2023-03-27 DIAGNOSIS — I10 ESSENTIAL HYPERTENSION: ICD-10-CM

## 2023-03-27 DIAGNOSIS — E11.42 TYPE 2 DIABETES MELLITUS WITH DIABETIC POLYNEUROPATHY, WITH LONG-TERM CURRENT USE OF INSULIN (HCC): Primary | ICD-10-CM

## 2023-03-27 DIAGNOSIS — Z79.4 TYPE 2 DIABETES MELLITUS WITH DIABETIC POLYNEUROPATHY, WITH LONG-TERM CURRENT USE OF INSULIN (HCC): Primary | ICD-10-CM

## 2023-03-27 DIAGNOSIS — F33.1 MAJOR DEPRESSIVE DISORDER, RECURRENT, MODERATE (HCC): ICD-10-CM

## 2023-03-27 PROBLEM — L97.425 DIABETIC ULCER OF LEFT MIDFOOT ASSOCIATED WITH TYPE 2 DIABETES MELLITUS, WITH MUSCLE INVOLVEMENT WITHOUT EVIDENCE OF NECROSIS (HCC): Status: RESOLVED | Noted: 2022-04-26 | Resolved: 2023-03-27

## 2023-03-27 PROBLEM — E11.621 DIABETIC ULCER OF LEFT MIDFOOT ASSOCIATED WITH TYPE 2 DIABETES MELLITUS, WITH MUSCLE INVOLVEMENT WITHOUT EVIDENCE OF NECROSIS (HCC): Status: RESOLVED | Noted: 2022-04-26 | Resolved: 2023-03-27

## 2023-03-27 PROCEDURE — G8420 CALC BMI NORM PARAMETERS: HCPCS

## 2023-03-27 PROCEDURE — 99214 OFFICE O/P EST MOD 30 MIN: CPT

## 2023-03-27 PROCEDURE — 3051F HG A1C>EQUAL 7.0%<8.0%: CPT

## 2023-03-27 PROCEDURE — G8427 DOCREV CUR MEDS BY ELIG CLIN: HCPCS

## 2023-03-27 PROCEDURE — 3077F SYST BP >= 140 MM HG: CPT

## 2023-03-27 PROCEDURE — G8484 FLU IMMUNIZE NO ADMIN: HCPCS

## 2023-03-27 PROCEDURE — 3080F DIAST BP >= 90 MM HG: CPT

## 2023-03-27 PROCEDURE — 2022F DILAT RTA XM EVC RTNOPTHY: CPT

## 2023-03-27 PROCEDURE — 1036F TOBACCO NON-USER: CPT

## 2023-03-27 RX ORDER — GABAPENTIN 300 MG/1
300 CAPSULE ORAL 2 TIMES DAILY
Qty: 180 CAPSULE | Refills: 1 | Status: SHIPPED | OUTPATIENT
Start: 2023-03-27 | End: 2023-09-23

## 2023-03-27 RX ORDER — INSULIN GLARGINE 100 [IU]/ML
15 INJECTION, SOLUTION SUBCUTANEOUS NIGHTLY
Qty: 5 ADJUSTABLE DOSE PRE-FILLED PEN SYRINGE | Refills: 3
Start: 2023-03-27

## 2023-03-27 ASSESSMENT — ENCOUNTER SYMPTOMS
ABDOMINAL PAIN: 0
BLOOD IN STOOL: 0
COLOR CHANGE: 0
NAUSEA: 0
VOMITING: 0
DIARRHEA: 0
SHORTNESS OF BREATH: 0
CONSTIPATION: 0

## 2023-03-27 NOTE — PROGRESS NOTES
3/27/2023    Norleen Long    Chief Complaint   Patient presents with    Other     6 week f/u  - no complaints       HPI  History was obtained from patient. Jayjay Dan is a pleasant 43 y.o. male who presents today for 6 week follow up for diabetes. He was offered a bed at skilled nursing while he awaits a disabled apartment. DM2: Restarted Lantus and Metformin and increased Trulicity with readings being around 230 in the mornings. He continues with wound care weekly. One more week or so and he will go to the  clinic for is first prosthesis fitting. Denies hypoglycemic episodes. HTN: Has not had his BP medications yet this morning as he has not had breakfast yet. 1. Type 2 diabetes mellitus with diabetic polyneuropathy, with long-term current use of insulin ([address]    2. Essential hypertension    3. Phantom pain after amputation of lower extremity ([address]    4. Major depressive disorder, recurrent, moderate (HCC)         REVIEW OF SYMPTOMS    Review of Systems   Constitutional:  Negative for chills, fever and unexpected weight change. Respiratory:  Negative for shortness of breath. Cardiovascular: Negative. Negative for chest pain, palpitations and leg swelling. Gastrointestinal:  Negative for abdominal pain, blood in stool, constipation, diarrhea, nausea and vomiting. Genitourinary:  Negative for difficulty urinating. Skin:  Negative for color change. Neurological:  Negative for light-headedness. Psychiatric/Behavioral:  Positive for sleep disturbance (2/2 neuropathy and phantom pain).       PAST MEDICAL HISTORY  Past Medical History:   Diagnosis Date    Abscess of left foot 4/22/2022    Anemia associated with acute blood loss 4/26/2022    Callus of foot     Right foot - see's Dr. Arturo Renteria    Cellulitis of left foot 4/22/2022    Diabetic ulcer of left midfoot associated with type 2 diabetes mellitus, with muscle involvement without evidence of necrosis ([address] 4/26/2022    Diabetic ulcer of

## 2023-03-27 NOTE — PATIENT INSTRUCTIONS
[address]  [address] [place] [address] Asia Milton, [address] [facility]  [phone]  [email or web address]

## 2023-03-28 ENCOUNTER — HOSPITAL ENCOUNTER (OUTPATIENT)
Dept: WOUND CARE | Age: 43
Discharge: HOME | End: 2023-03-28
Payer: MEDICARE

## 2023-03-28 VITALS
DIASTOLIC BLOOD PRESSURE: 112 MMHG | HEART RATE: 103 BPM | TEMPERATURE: 97.9 F | RESPIRATION RATE: 18 BRPM | SYSTOLIC BLOOD PRESSURE: 165 MMHG

## 2023-03-28 DIAGNOSIS — Z89.432 PARTIAL NONTRAUMATIC AMPUTATION OF FOOT, LEFT (HCC): ICD-10-CM

## 2023-03-28 DIAGNOSIS — L97.422 DIABETIC ULCER OF LEFT MIDFOOT ASSOCIATED WITH TYPE 2 DIABETES MELLITUS, WITH FAT LAYER EXPOSED (HCC): Primary | ICD-10-CM

## 2023-03-28 DIAGNOSIS — E11.621 DIABETIC ULCER OF LEFT MIDFOOT ASSOCIATED WITH TYPE 2 DIABETES MELLITUS, WITH FAT LAYER EXPOSED (HCC): Primary | ICD-10-CM

## 2023-03-28 DIAGNOSIS — L03.116 CELLULITIS OF LEFT FOOT: ICD-10-CM

## 2023-03-28 DIAGNOSIS — T87.81 DEHISCENCE OF AMPUTATION STUMP OF RIGHT LOWER EXTREMITY (HCC): ICD-10-CM

## 2023-03-28 DIAGNOSIS — Z89.511 S/P BKA (BELOW KNEE AMPUTATION), RIGHT (HCC): ICD-10-CM

## 2023-03-28 PROCEDURE — 11055 PARING/CUTG B9 HYPRKER LES 1: CPT

## 2023-03-28 PROCEDURE — 11042 DBRDMT SUBQ TIS 1ST 20SQCM/<: CPT | Performed by: NURSE PRACTITIONER

## 2023-03-28 PROCEDURE — 11042 DBRDMT SUBQ TIS 1ST 20SQCM/<: CPT

## 2023-03-28 RX ORDER — BACITRACIN, NEOMYCIN, POLYMYXIN B 400; 3.5; 5 [USP'U]/G; MG/G; [USP'U]/G
OINTMENT TOPICAL ONCE
OUTPATIENT
Start: 2023-03-28 | End: 2023-03-28

## 2023-03-28 RX ORDER — BETAMETHASONE DIPROPIONATE 0.05 %
OINTMENT (GRAM) TOPICAL ONCE
OUTPATIENT
Start: 2023-03-28 | End: 2023-03-28

## 2023-03-28 RX ORDER — LIDOCAINE 50 MG/G
OINTMENT TOPICAL ONCE
OUTPATIENT
Start: 2023-03-28 | End: 2023-03-28

## 2023-03-28 RX ORDER — LIDOCAINE HYDROCHLORIDE 20 MG/ML
JELLY TOPICAL ONCE
OUTPATIENT
Start: 2023-03-28 | End: 2023-03-28

## 2023-03-28 RX ORDER — BACITRACIN ZINC AND POLYMYXIN B SULFATE 500; 1000 [USP'U]/G; [USP'U]/G
OINTMENT TOPICAL ONCE
OUTPATIENT
Start: 2023-03-28 | End: 2023-03-28

## 2023-03-28 RX ORDER — CLOBETASOL PROPIONATE 0.5 MG/G
OINTMENT TOPICAL ONCE
OUTPATIENT
Start: 2023-03-28 | End: 2023-03-28

## 2023-03-28 RX ORDER — GINSENG 100 MG
CAPSULE ORAL ONCE
OUTPATIENT
Start: 2023-03-28 | End: 2023-03-28

## 2023-03-28 RX ORDER — LIDOCAINE HYDROCHLORIDE 40 MG/ML
SOLUTION TOPICAL ONCE
OUTPATIENT
Start: 2023-03-28 | End: 2023-03-28

## 2023-03-28 RX ORDER — GENTAMICIN SULFATE 1 MG/G
OINTMENT TOPICAL ONCE
OUTPATIENT
Start: 2023-03-28 | End: 2023-03-28

## 2023-03-28 RX ORDER — LIDOCAINE 40 MG/G
CREAM TOPICAL ONCE
OUTPATIENT
Start: 2023-03-28 | End: 2023-03-28

## 2023-03-28 ASSESSMENT — PAIN SCALES - GENERAL: PAINLEVEL_OUTOF10: 5

## 2023-03-28 NOTE — PROGRESS NOTES
Multilayer Compression Wrap   (Not Unna) Below the Knee    NAME:  Edison Levine OF BIRTH:  1980  MEDICAL RECORD NUMBER:  [id number]  DATE:  3/28/2023    Multilayer compression wrap: Removed old Multilayer wrap if indicated and wash leg with mild soap/water. Applied moisturizing agent to dry skin as needed. Applied primary and secondary dressing as ordered. Applied multilayered dressing below the knee to left lower leg. Instructed patient/caregiver not to remove dressing and to keep it clean and dry. Instructed patient/caregiver on complications to report to provider, such as pain, numbness in toes, heavy drainage, and slippage of dressing. Instructed patient on purpose of compression dressing and on activity and exercise recommendations.       Electronically signed by Felicity Degroot RN on 3/28/2023 at 10:38 AM
in place. Leave dressing in place for 1 week. Wound vac discontinued 23, may return to [facility]        Follow Up Instructions: At the [address] in 1 week: Tuesday   Primary Wound Care Provider: Cathryn Dodd CNP   Call  for any questions or concerns.   Central [phone]      Treatment Note      Written Patient Dismissal Instructions Given            Electronically signed by RAFAEL Arambula CNP on 3/28/2023 at 2:43 PM

## 2023-03-28 NOTE — DISCHARGE INSTRUCTIONS
dressing in place. Leave dressing in place for 1 week. Wound vac discontinued 2/14/23, may return to [facility]        Follow Up Instructions: At the [address] in 1 week: Tuesday   Primary Wound Care Provider: Jefry Tejada CNP   Call  for any questions or concerns.   Central Scheduling: [phone]

## 2023-04-04 ENCOUNTER — HOSPITAL ENCOUNTER (OUTPATIENT)
Dept: WOUND CARE | Age: 43
Discharge: HOME | End: 2023-04-04
Payer: MEDICARE

## 2023-04-04 VITALS
DIASTOLIC BLOOD PRESSURE: 95 MMHG | SYSTOLIC BLOOD PRESSURE: 180 MMHG | RESPIRATION RATE: 18 BRPM | TEMPERATURE: 97.8 F | HEART RATE: 95 BPM

## 2023-04-04 DIAGNOSIS — E11.621 DIABETIC ULCER OF LEFT MIDFOOT ASSOCIATED WITH TYPE 2 DIABETES MELLITUS, WITH FAT LAYER EXPOSED (HCC): Primary | ICD-10-CM

## 2023-04-04 DIAGNOSIS — Z89.432 PARTIAL NONTRAUMATIC AMPUTATION OF FOOT, LEFT (HCC): ICD-10-CM

## 2023-04-04 DIAGNOSIS — L03.116 CELLULITIS OF LEFT FOOT: ICD-10-CM

## 2023-04-04 DIAGNOSIS — L97.522 DIABETIC ULCER OF TOE OF LEFT FOOT ASSOCIATED WITH TYPE 2 DIABETES MELLITUS, WITH FAT LAYER EXPOSED (HCC): ICD-10-CM

## 2023-04-04 DIAGNOSIS — L97.422 DIABETIC ULCER OF LEFT MIDFOOT ASSOCIATED WITH TYPE 2 DIABETES MELLITUS, WITH FAT LAYER EXPOSED (HCC): Primary | ICD-10-CM

## 2023-04-04 DIAGNOSIS — E11.621 DIABETIC ULCER OF TOE OF LEFT FOOT ASSOCIATED WITH TYPE 2 DIABETES MELLITUS, WITH FAT LAYER EXPOSED (HCC): ICD-10-CM

## 2023-04-04 PROCEDURE — 11042 DBRDMT SUBQ TIS 1ST 20SQCM/<: CPT

## 2023-04-04 PROCEDURE — 11042 DBRDMT SUBQ TIS 1ST 20SQCM/<: CPT | Performed by: NURSE PRACTITIONER

## 2023-04-04 RX ORDER — LIDOCAINE HYDROCHLORIDE 40 MG/ML
SOLUTION TOPICAL ONCE
OUTPATIENT
Start: 2023-04-04 | End: 2023-04-04

## 2023-04-04 RX ORDER — GENTAMICIN SULFATE 1 MG/G
OINTMENT TOPICAL ONCE
OUTPATIENT
Start: 2023-04-04 | End: 2023-04-04

## 2023-04-04 RX ORDER — LIDOCAINE HYDROCHLORIDE 20 MG/ML
JELLY TOPICAL ONCE
OUTPATIENT
Start: 2023-04-04 | End: 2023-04-04

## 2023-04-04 RX ORDER — LIDOCAINE 50 MG/G
OINTMENT TOPICAL ONCE
OUTPATIENT
Start: 2023-04-04 | End: 2023-04-04

## 2023-04-04 RX ORDER — GINSENG 100 MG
CAPSULE ORAL ONCE
OUTPATIENT
Start: 2023-04-04 | End: 2023-04-04

## 2023-04-04 RX ORDER — BACITRACIN ZINC AND POLYMYXIN B SULFATE 500; 1000 [USP'U]/G; [USP'U]/G
OINTMENT TOPICAL ONCE
OUTPATIENT
Start: 2023-04-04 | End: 2023-04-04

## 2023-04-04 RX ORDER — LIDOCAINE 40 MG/G
CREAM TOPICAL ONCE
OUTPATIENT
Start: 2023-04-04 | End: 2023-04-04

## 2023-04-04 RX ORDER — CLOBETASOL PROPIONATE 0.5 MG/G
OINTMENT TOPICAL ONCE
OUTPATIENT
Start: 2023-04-04 | End: 2023-04-04

## 2023-04-04 RX ORDER — BETAMETHASONE DIPROPIONATE 0.05 %
OINTMENT (GRAM) TOPICAL ONCE
OUTPATIENT
Start: 2023-04-04 | End: 2023-04-04

## 2023-04-04 RX ORDER — BACITRACIN, NEOMYCIN, POLYMYXIN B 400; 3.5; 5 [USP'U]/G; MG/G; [USP'U]/G
OINTMENT TOPICAL ONCE
OUTPATIENT
Start: 2023-04-04 | End: 2023-04-04

## 2023-04-04 NOTE — DISCHARGE INSTRUCTIONS
help hold dressing in place. Leave dressing in place for 1 week. Wound vac discontinued 23, may return to [facility]        Follow Up Instructions: At the [address] in 1 week: Tuesday   Primary Wound Care Provider: Suki Snow CNP   Call  for any questions or concerns.   Central [phone]

## 2023-04-04 NOTE — PROGRESS NOTES
Correction- 1022 This SN applied to Left Plantar Foot per order Lotion , Anasept, Shwetha to wound bed, Ioplex, ABD, & Coban 2 Lite to mid shin. Pt. Tolerated  dressing application well.
Multilayer Compression Wrap   (Not Unna) Below the Knee    NAME:  Evelina Noguera OF BIRTH:  1980  MEDICAL RECORD NUMBER:  [id number]  DATE:  4/4/2023    Multilayer compression wrap: Removed old Multilayer wrap if indicated and wash leg with mild soap/water. Applied moisturizing agent to dry skin as needed. Applied primary and secondary dressing as ordered. Applied multilayered dressing below the knee to left lower leg. Instructed patient/caregiver not to remove dressing and to keep it clean and dry. Instructed patient/caregiver on complications to report to provider, such as pain, numbness in toes, heavy drainage, and slippage of dressing. Instructed patient on purpose of compression dressing and on activity and exercise recommendations.       Electronically signed by Ericka Elizondo LPN on 8/4/3203 at 22:90 AMMultilayer Compression Wrap   (Not Joy Ever) Below the Knee    NAME:  Evelina Noguera OF BIRTH:  1980  MEDICAL RECORD NUMBER:  [id number]  DATE:  4/4/2023          Electronically signed by Ericka Elizondo LPN on 7/3/2840 at 51:92 AM
subcutaneous tissue is required to promote healing and evaluate the extent of previous healing. Performed by: RAFAEL Gentile CNP    Consent obtained: Yes    Time out taken:  Yes    Pain Control: Anesthetic  Anesthetic:       Debridement:Excisional Debridement    Using curette the wound(s) was/were sharply debrided down through and including the removal of subcutaneous      Devitalized Tissue Debrided:  fibrin, biofilm, slough, necrotic/eschar, and exudate    Pre Debridement Measurements:  Are located in the Wound Documentation Flow Sheet    All active wounds listed below with today's date are evaluated  Wound(s)    debrided this date include # : 3    Post  Debridement Measurements:  Wound 10/20/22 #3 right amp site.  (Active)   Wound Image   03/28/23 0934   Wound Etiology Non-Healing Surgical 04/04/23 0919   Dressing Status New dressing applied 04/04/23 1020   Wound Cleansed Wound cleanser 04/04/23 0919   Dressing/Treatment Other (comment) 04/04/23 1020   Offloading for Diabetic Foot Ulcers Other (comment) 04/04/23 0919   Wound Length (cm) 0 cm 04/04/23 0919   Wound Width (cm) 0 cm 04/04/23 0919   Wound Depth (cm) 0 cm 04/04/23 0919   Wound Surface Area (cm^2) 0 cm^2 04/04/23 0919   Change in Wound Size % (l*w) 100 04/04/23 0919   Wound Volume (cm^3) 0 cm^3 04/04/23 0919   Wound Healing % 100 04/04/23 0919   Post-Procedure Length (cm) 0.4 cm 03/28/23 0934   Post-Procedure Width (cm) 1 cm 03/28/23 0934   Post-Procedure Depth (cm) 0.1 cm 03/28/23 0934   Post-Procedure Surface Area (cm^2) 0.4 cm^2 03/28/23 0934   Post-Procedure Volume (cm^3) 0.04 cm^3 03/28/23 0934   Distance Tunneling (cm) 0 cm 04/04/23 0919   Tunneling Position ___ O'Clock 0 04/04/23 0919   Undermining Starts ___ O'Clock 0 04/04/23 0919   Undermining Ends___ O'Clock 0 04/04/23 0919   Undermining Maxium Distance (cm) 0 04/04/23 0919   Wound Assessment Pink/red 04/04/23 0919   Drainage Amount None 04/04/23 0919   Drainage Description

## 2023-04-07 ENCOUNTER — TELEPHONE (OUTPATIENT)
Dept: FAMILY MEDICINE CLINIC | Age: 43
End: 2023-04-07

## 2023-04-07 ENCOUNTER — OFFICE VISIT (OUTPATIENT)
Dept: FAMILY MEDICINE CLINIC | Age: 43
End: 2023-04-07

## 2023-04-07 VITALS
BODY MASS INDEX: 23.03 KG/M2 | DIASTOLIC BLOOD PRESSURE: 92 MMHG | OXYGEN SATURATION: 100 % | HEIGHT: 72 IN | HEART RATE: 90 BPM | WEIGHT: 170 LBS | SYSTOLIC BLOOD PRESSURE: 154 MMHG

## 2023-04-07 DIAGNOSIS — E11.42 TYPE 2 DIABETES MELLITUS WITH DIABETIC POLYNEUROPATHY, WITH LONG-TERM CURRENT USE OF INSULIN (HCC): ICD-10-CM

## 2023-04-07 DIAGNOSIS — I10 ESSENTIAL HYPERTENSION: ICD-10-CM

## 2023-04-07 DIAGNOSIS — F10.920 ACUTE ALCOHOLIC INTOXICATION WITHOUT COMPLICATION (HCC): ICD-10-CM

## 2023-04-07 DIAGNOSIS — J34.0 ABSCESS OF NOSE: Primary | ICD-10-CM

## 2023-04-07 DIAGNOSIS — K21.9 GASTROESOPHAGEAL REFLUX DISEASE WITHOUT ESOPHAGITIS: ICD-10-CM

## 2023-04-07 DIAGNOSIS — Z79.4 TYPE 2 DIABETES MELLITUS WITH DIABETIC POLYNEUROPATHY, WITH LONG-TERM CURRENT USE OF INSULIN (HCC): ICD-10-CM

## 2023-04-07 DIAGNOSIS — E11.40 TYPE 2 DIABETES MELLITUS WITH DIABETIC NEUROPATHY, WITHOUT LONG-TERM CURRENT USE OF INSULIN (HCC): ICD-10-CM

## 2023-04-07 DIAGNOSIS — E78.5 HYPERLIPIDEMIA, UNSPECIFIED HYPERLIPIDEMIA TYPE: ICD-10-CM

## 2023-04-07 DIAGNOSIS — I82.452 ACUTE DEEP VEIN THROMBOSIS (DVT) OF LEFT PERONEAL VEIN (HCC): ICD-10-CM

## 2023-04-07 DIAGNOSIS — F41.9 ANXIETY: ICD-10-CM

## 2023-04-07 RX ORDER — PRAZOSIN HYDROCHLORIDE 1 MG/1
1 CAPSULE ORAL NIGHTLY
Qty: 30 CAPSULE | Refills: 3 | Status: SHIPPED | OUTPATIENT
Start: 2023-04-07

## 2023-04-07 RX ORDER — PANTOPRAZOLE SODIUM 40 MG/1
40 TABLET, DELAYED RELEASE ORAL
Qty: 30 TABLET | Refills: 3 | Status: SHIPPED | OUTPATIENT
Start: 2023-04-07

## 2023-04-07 RX ORDER — CLOPIDOGREL BISULFATE 75 MG/1
75 TABLET ORAL DAILY
Qty: 30 TABLET | Refills: 3 | Status: SHIPPED | OUTPATIENT
Start: 2023-04-07

## 2023-04-07 RX ORDER — CARVEDILOL 12.5 MG/1
12.5 TABLET ORAL 2 TIMES DAILY WITH MEALS
Qty: 180 TABLET | Refills: 0 | Status: SHIPPED | OUTPATIENT
Start: 2023-04-07

## 2023-04-07 RX ORDER — AMLODIPINE BESYLATE 2.5 MG/1
2.5 TABLET ORAL DAILY
Qty: 30 TABLET | Refills: 3 | Status: SHIPPED | OUTPATIENT
Start: 2023-04-07

## 2023-04-07 RX ORDER — ASPIRIN 81 MG/1
81 TABLET, CHEWABLE ORAL DAILY
Qty: 30 TABLET | Refills: 2 | Status: SHIPPED | OUTPATIENT
Start: 2023-04-07

## 2023-04-07 RX ORDER — METFORMIN HYDROCHLORIDE 500 MG/1
1000 TABLET, EXTENDED RELEASE ORAL 2 TIMES DAILY WITH MEALS
Qty: 120 TABLET | Refills: 3 | Status: SHIPPED | OUTPATIENT
Start: 2023-04-07

## 2023-04-07 RX ORDER — ATORVASTATIN CALCIUM 40 MG/1
40 TABLET, FILM COATED ORAL NIGHTLY
Qty: 30 TABLET | Refills: 3 | Status: SHIPPED | OUTPATIENT
Start: 2023-04-07 | End: 2023-05-07

## 2023-04-07 RX ORDER — SULFAMETHOXAZOLE AND TRIMETHOPRIM 800; 160 MG/1; MG/1
1 TABLET ORAL 2 TIMES DAILY
Qty: 14 TABLET | Refills: 0 | Status: SHIPPED | OUTPATIENT
Start: 2023-04-07 | End: 2023-04-14 | Stop reason: ALTCHOICE

## 2023-04-07 RX ORDER — INSULIN GLARGINE 100 [IU]/ML
15 INJECTION, SOLUTION SUBCUTANEOUS NIGHTLY
Qty: 5 ADJUSTABLE DOSE PRE-FILLED PEN SYRINGE | Refills: 3 | Status: SHIPPED | OUTPATIENT
Start: 2023-04-07

## 2023-04-07 RX ORDER — LOSARTAN POTASSIUM 100 MG/1
100 TABLET ORAL DAILY
Qty: 30 TABLET | Refills: 3 | Status: SHIPPED | OUTPATIENT
Start: 2023-04-07

## 2023-04-07 RX ORDER — QUETIAPINE FUMARATE 200 MG/1
200 TABLET, FILM COATED ORAL 2 TIMES DAILY
Qty: 60 TABLET | Refills: 3 | Status: SHIPPED | OUTPATIENT
Start: 2023-04-07

## 2023-04-07 NOTE — TELEPHONE ENCOUNTER
Spoke with pt who states he needs refills, is aware he has refills, but doesn't have any of his bottles. Pt states without the rx numbers he can't get a refill ca your office help. Unable to speak with anyone with [facility] pharmacy until after 2 pm d/t closed.

## 2023-04-07 NOTE — TELEPHONE ENCOUNTER
Requested Prescriptions     Signed Prescriptions Disp Refills    carvedilol (COREG) 12.5 MG tablet 180 tablet 0     Sig: Take 1 tablet by mouth 2 times daily (with meals)     Authorizing Provider: Dion Plasencia     Ordering User: LEAH Sorenson    metFORMIN (GLUCOPHAGE-XR) 500 MG extended release tablet 120 tablet 3     Sig: Take 2 tablets by mouth 2 times daily (with meals)     [facility]     Ordering User: LEAH Sorenson    amLODIPine (NORVASC) 2.5 MG tablet 30 tablet 3     Sig: Take 1 tablet by mouth daily     [facility]     Ordering User: LEAH Sorenson    prazosin (MINIPRESS) 1 MG capsule 30 capsule 3     Sig: Take 1 capsule by mouth nightly     [facility]     Ordering User: LEAH Sorenson    QUEtiapine (SEROQUEL) 200 MG tablet 60 tablet 3     Sig: Take 1 tablet by mouth 2 times daily     [facility]     Ordering User: LEAH Sorenson    losartan (COZAAR) 100 MG tablet 30 tablet 3     Sig: Take 1 tablet by mouth daily     [facility]izing [facility]: [facility]     Thad User: Doyle Garza    clopidogrel (PLAVIX) 75 MG tablet 30 tablet 3     Sig: Take 1 tablet by mouth daily     [facility]izing [facility]: [facility]     Ordering User: LEAH Sorenson    atorvastatin (LIPITOR) 40 MG tablet 30 tablet 3     Sig: Take 1 tablet by mouth nightly     [facility]izing [facility]: [facility]     Ordering User: LEAH Sorenson    pantoprazole (PROTONIX) 40 MG tablet 30 tablet 3     Sig: Take 1 tablet by mouth every morning (before breakfast)     Authorizing Provider: [facility]     Ordering User: LEAH Sorenson    insulin glargine (LANTUS SOLOSTAR) 100 UNIT/ML injection pen 5 Adjustable Dose Pre-filled Pen Syringe 3     Sig: Inject 15 Units into the skin nightly     Authorizing Provider: [facility]     Thad User: Doyle Garza

## 2023-04-07 NOTE — TELEPHONE ENCOUNTER
Patient called and is requesting a call back on his medications. Patient is out of some of his medications but he does not know which ones because he does not have the bottles.   Isis Eaton

## 2023-04-17 ENCOUNTER — HOSPITAL ENCOUNTER (OUTPATIENT)
Dept: WOUND CARE | Age: 43
Discharge: HOME | End: 2023-04-17
Payer: MEDICARE

## 2023-04-17 VITALS
TEMPERATURE: 97 F | HEART RATE: 114 BPM | DIASTOLIC BLOOD PRESSURE: 85 MMHG | RESPIRATION RATE: 16 BRPM | SYSTOLIC BLOOD PRESSURE: 145 MMHG

## 2023-04-17 DIAGNOSIS — L97.422 DIABETIC ULCER OF LEFT MIDFOOT ASSOCIATED WITH TYPE 2 DIABETES MELLITUS, WITH FAT LAYER EXPOSED (HCC): Primary | ICD-10-CM

## 2023-04-17 DIAGNOSIS — E11.621 DIABETIC ULCER OF TOE OF LEFT FOOT ASSOCIATED WITH TYPE 2 DIABETES MELLITUS, WITH FAT LAYER EXPOSED (HCC): ICD-10-CM

## 2023-04-17 DIAGNOSIS — Z89.432 PARTIAL NONTRAUMATIC AMPUTATION OF FOOT, LEFT (HCC): ICD-10-CM

## 2023-04-17 DIAGNOSIS — L97.522 DIABETIC ULCER OF TOE OF LEFT FOOT ASSOCIATED WITH TYPE 2 DIABETES MELLITUS, WITH FAT LAYER EXPOSED (HCC): ICD-10-CM

## 2023-04-17 DIAGNOSIS — E11.621 DIABETIC ULCER OF LEFT MIDFOOT ASSOCIATED WITH TYPE 2 DIABETES MELLITUS, WITH FAT LAYER EXPOSED (HCC): Primary | ICD-10-CM

## 2023-04-17 DIAGNOSIS — L03.116 CELLULITIS OF LEFT FOOT: ICD-10-CM

## 2023-04-17 PROCEDURE — 11042 DBRDMT SUBQ TIS 1ST 20SQCM/<: CPT

## 2023-04-17 RX ORDER — BACITRACIN ZINC AND POLYMYXIN B SULFATE 500; 1000 [USP'U]/G; [USP'U]/G
OINTMENT TOPICAL ONCE
OUTPATIENT
Start: 2023-04-17 | End: 2023-04-17

## 2023-04-17 RX ORDER — BACITRACIN, NEOMYCIN, POLYMYXIN B 400; 3.5; 5 [USP'U]/G; MG/G; [USP'U]/G
OINTMENT TOPICAL ONCE
OUTPATIENT
Start: 2023-04-17 | End: 2023-04-17

## 2023-04-17 RX ORDER — GINSENG 100 MG
CAPSULE ORAL ONCE
OUTPATIENT
Start: 2023-04-17 | End: 2023-04-17

## 2023-04-17 RX ORDER — CLOBETASOL PROPIONATE 0.5 MG/G
OINTMENT TOPICAL ONCE
OUTPATIENT
Start: 2023-04-17 | End: 2023-04-17

## 2023-04-17 RX ORDER — LIDOCAINE 40 MG/G
CREAM TOPICAL ONCE
OUTPATIENT
Start: 2023-04-17 | End: 2023-04-17

## 2023-04-17 RX ORDER — LIDOCAINE 50 MG/G
OINTMENT TOPICAL ONCE
OUTPATIENT
Start: 2023-04-17 | End: 2023-04-17

## 2023-04-17 RX ORDER — BETAMETHASONE DIPROPIONATE 0.05 %
OINTMENT (GRAM) TOPICAL ONCE
OUTPATIENT
Start: 2023-04-17 | End: 2023-04-17

## 2023-04-17 RX ORDER — LIDOCAINE HYDROCHLORIDE 40 MG/ML
SOLUTION TOPICAL ONCE
OUTPATIENT
Start: 2023-04-17 | End: 2023-04-17

## 2023-04-17 RX ORDER — LIDOCAINE HYDROCHLORIDE 20 MG/ML
JELLY TOPICAL ONCE
OUTPATIENT
Start: 2023-04-17 | End: 2023-04-17

## 2023-04-17 RX ORDER — GENTAMICIN SULFATE 1 MG/G
OINTMENT TOPICAL ONCE
OUTPATIENT
Start: 2023-04-17 | End: 2023-04-17

## 2023-04-17 ASSESSMENT — ENCOUNTER SYMPTOMS
ABDOMINAL PAIN: 0
NAUSEA: 0
SORE THROAT: 0
WHEEZING: 0
SHORTNESS OF BREATH: 0

## 2023-04-17 ASSESSMENT — PAIN SCALES - GENERAL: PAINLEVEL_OUTOF10: 0

## 2023-04-17 ASSESSMENT — PAIN SCALES - WONG BAKER: WONGBAKER_NUMERICALRESPONSE: 0

## 2023-04-17 NOTE — DISCHARGE INSTRUCTIONS
to help hold dressing in place. Leave dressing in place for 1 week. Wound vac discontinued 23, may return to [facility]        Follow Up Instructions: At the [address] in 1 week: Monday  Primary Wound Care Provider: Christy Leo CNP   Call  for any questions or concerns.   Central [phone]

## 2023-04-21 NOTE — PROGRESS NOTES
Multilayer Compression Wrap   (Not Unna) Below the Knee    NAME:  Hernán Tillman OF BIRTH:  1980  MEDICAL RECORD NUMBER:  [id number]  DATE:  4/17/2023    Multilayer compression wrap: Removed old Multilayer wrap if indicated and wash leg with mild soap/water. Applied moisturizing agent to dry skin as needed. Applied primary and secondary dressing as ordered. Applied multilayered dressing below the knee to left lower leg. Instructed patient/caregiver not to remove dressing and to keep it clean and dry. Instructed patient/caregiver on complications to report to provider, such as pain, numbness in toes, heavy drainage, and slippage of dressing. Instructed patient on purpose of compression dressing and on activity and exercise recommendations.       Electronically signed by Indra Russo RN on 4/17/2023 at 8:34 AM
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 Harper Lu. She placed a wound VAC to on the stump at that time. On 9/5/2022 Dr. Heidi Guardado performed a primary closure of his right BKA stump. Wound Pain Timing/Severity: none  Quality of pain: N/A  Severity of pain:  0 / 10   Modifying Factors: diabetes and chronic pressure  Associated Signs/Symptoms: drainage     Diabetes: Yes, on an oral and insulin regimen  Hemoglobin A1C   Date Value Ref Range Status   01/11/2023 7.6 See comment % Final     Comment:     Comment:  Diagnosis of Diabetes: > or = 6.5%  Increased risk of diabetes (Prediabetes): 5.7-6.4%  Glycemic Control: Nonpregnant Adults: <7.0%                    Pregnant: <6.0%          Diabetes education provided today:    Diabetes pathoetiology, difference between type 1 and type 2 diabetes, and progressive nature of Type 2 DM. Diabetic Neuropathy: signs and therapy. Foot care: advised to wash feet daily, pat dry and apply lotion at night, avoiding between toes. Need to look at feet daily and report to a physician any signs of inflammation or skin damage. Discussed diabetes shoes and socks. Diabetic management related to wound care    Smoking: Never smoker  Obesity:No  Anticoagulant therapy: No  Immunosuppression: No    Patient educated on the 6 essential components necessary for wound healing: Circulation, Debridements, Proper Dressings and Topical Wound Products, Infection Control, Edema Control and Offloading. Patient educated on those factors that negatively effect or impact wound healing: smoking, obesity, uncontrolled diabetes, anticoagulant and immunosuppressive regimens, inadequate nutrition, untreated arterial and venous disease if applicable and measures to manage edema. Nutritional status: well nourished. Discussed need for increased protein and calories for wound healing and good sources of protein (just over 7 grams for every 20 pounds of body weight).

## 2023-04-24 ENCOUNTER — TELEPHONE (OUTPATIENT)
Dept: FAMILY MEDICINE CLINIC | Age: 43
End: 2023-04-24

## 2023-04-24 ENCOUNTER — CARE COORDINATION (OUTPATIENT)
Dept: CARE COORDINATION | Age: 43
End: 2023-04-24

## 2023-04-24 ENCOUNTER — OFFICE VISIT (OUTPATIENT)
Dept: FAMILY MEDICINE CLINIC | Age: 43
End: 2023-04-24
Payer: MEDICARE

## 2023-04-24 VITALS — HEART RATE: 111 BPM | SYSTOLIC BLOOD PRESSURE: 132 MMHG | DIASTOLIC BLOOD PRESSURE: 74 MMHG | OXYGEN SATURATION: 96 %

## 2023-04-24 DIAGNOSIS — H54.61 VISION LOSS OF RIGHT EYE: Primary | ICD-10-CM

## 2023-04-24 DIAGNOSIS — E11.40 TYPE 2 DIABETES MELLITUS WITH DIABETIC NEUROPATHY, WITHOUT LONG-TERM CURRENT USE OF INSULIN (HCC): ICD-10-CM

## 2023-04-24 DIAGNOSIS — R79.89 ELEVATED PLATELET COUNT: ICD-10-CM

## 2023-04-24 DIAGNOSIS — F33.2 SEVERE EPISODE OF RECURRENT MAJOR DEPRESSIVE DISORDER, WITHOUT PSYCHOTIC FEATURES (HCC): ICD-10-CM

## 2023-04-24 PROBLEM — E88.89 KETOSIS (HCC): Status: RESOLVED | Noted: 2023-01-10 | Resolved: 2023-04-24

## 2023-04-24 LAB
ANION GAP SERPL CALCULATED.3IONS-SCNC: 11 MMOL/L (ref 3–16)
BASOPHILS # BLD: 0 K/UL (ref 0–0.2)
BASOPHILS NFR BLD: 0.5 %
BUN SERPL-MCNC: 13 MG/DL (ref 7–20)
CALCIUM SERPL-MCNC: 9.3 MG/DL (ref 8.3–10.6)
CHLORIDE SERPL-SCNC: 95 MMOL/L (ref 99–110)
CO2 SERPL-SCNC: 24 MMOL/L (ref 21–32)
CREAT SERPL-MCNC: 1 MG/DL (ref 0.9–1.3)
DEPRECATED RDW RBC AUTO: 12.9 % (ref 12.4–15.4)
EOSINOPHIL # BLD: 0.2 K/UL (ref 0–0.6)
EOSINOPHIL NFR BLD: 2.1 %
GFR SERPLBLD CREATININE-BSD FMLA CKD-EPI: >60 ML/MIN/{1.73_M2}
GLUCOSE SERPL-MCNC: 410 MG/DL (ref 70–99)
HCT VFR BLD AUTO: 39.3 % (ref 40.5–52.5)
HGB BLD-MCNC: 13.6 G/DL (ref 13.5–17.5)
LYMPHOCYTES # BLD: 1.7 K/UL (ref 1–5.1)
LYMPHOCYTES NFR BLD: 21.8 %
MCH RBC QN AUTO: 32.7 PG (ref 26–34)
MCHC RBC AUTO-ENTMCNC: 34.6 G/DL (ref 31–36)
MCV RBC AUTO: 94.6 FL (ref 80–100)
MONOCYTES # BLD: 0.5 K/UL (ref 0–1.3)
MONOCYTES NFR BLD: 6.1 %
NEUTROPHILS # BLD: 5.5 K/UL (ref 1.7–7.7)
NEUTROPHILS NFR BLD: 69.5 %
PLATELET # BLD AUTO: 342 K/UL (ref 135–450)
PMV BLD AUTO: 9 FL (ref 5–10.5)
POTASSIUM SERPL-SCNC: 4.8 MMOL/L (ref 3.5–5.1)
RBC # BLD AUTO: 4.15 M/UL (ref 4.2–5.9)
SODIUM SERPL-SCNC: 130 MMOL/L (ref 136–145)
WBC # BLD AUTO: 7.9 K/UL (ref 4–11)

## 2023-04-24 PROCEDURE — 3078F DIAST BP <80 MM HG: CPT

## 2023-04-24 PROCEDURE — 2022F DILAT RTA XM EVC RTNOPTHY: CPT

## 2023-04-24 PROCEDURE — G8427 DOCREV CUR MEDS BY ELIG CLIN: HCPCS

## 2023-04-24 PROCEDURE — 3075F SYST BP GE 130 - 139MM HG: CPT

## 2023-04-24 PROCEDURE — 99214 OFFICE O/P EST MOD 30 MIN: CPT

## 2023-04-24 PROCEDURE — 3051F HG A1C>EQUAL 7.0%<8.0%: CPT

## 2023-04-24 PROCEDURE — 1036F TOBACCO NON-USER: CPT

## 2023-04-24 PROCEDURE — G8420 CALC BMI NORM PARAMETERS: HCPCS

## 2023-04-24 RX ORDER — GLUCOSAMINE HCL/CHONDROITIN SU 500-400 MG
CAPSULE ORAL
Qty: 100 STRIP | Refills: 5 | Status: SHIPPED | OUTPATIENT
Start: 2023-04-24 | End: 2023-04-25 | Stop reason: SDUPTHER

## 2023-04-24 RX ORDER — GLUCOSAMINE HCL/CHONDROITIN SU 500-400 MG
CAPSULE ORAL
Qty: 100 STRIP | Refills: 5 | Status: SHIPPED | OUTPATIENT
Start: 2023-04-24 | End: 2023-04-24 | Stop reason: SDUPTHER

## 2023-04-24 RX ORDER — FLUOXETINE HYDROCHLORIDE 40 MG/1
CAPSULE ORAL
Qty: 180 CAPSULE | Refills: 1 | Status: SHIPPED | OUTPATIENT
Start: 2023-04-24

## 2023-04-24 RX ORDER — INSULIN GLARGINE 100 [IU]/ML
15 INJECTION, SOLUTION SUBCUTANEOUS NIGHTLY
Qty: 5 ADJUSTABLE DOSE PRE-FILLED PEN SYRINGE | Refills: 3 | Status: SHIPPED | OUTPATIENT
Start: 2023-04-24

## 2023-04-24 RX ORDER — BLOOD-GLUCOSE METER
1 KIT MISCELLANEOUS DAILY
Qty: 1 KIT | Refills: 0 | Status: SHIPPED | OUTPATIENT
Start: 2023-04-24

## 2023-04-24 RX ORDER — LANCETS 30 GAUGE
1 EACH MISCELLANEOUS DAILY
Qty: 100 EACH | Refills: 3 | Status: SHIPPED | OUTPATIENT
Start: 2023-04-24

## 2023-04-24 ASSESSMENT — ENCOUNTER SYMPTOMS
SHORTNESS OF BREATH: 0
BLOOD IN STOOL: 0
EYE ITCHING: 0
NAUSEA: 0
VOMITING: 0
EYE REDNESS: 0
ABDOMINAL PAIN: 0
EYE DISCHARGE: 0
DIARRHEA: 0
PHOTOPHOBIA: 0
EYE PAIN: 0
CONSTIPATION: 0

## 2023-04-24 NOTE — PROGRESS NOTES
Spoke with Rosa M per Mary note. Davida Jamaal states need to contact [facility] for the [place] benoit.  Mary informed
visit:    Vision loss of right eye  Urgent referral called to office with same day appointment made for patient at 10:25am with Dr. Rosendo Hines. Directions and referral provided to patient. Concerns for sudden loss of vision (x3-4 weeks per patient). No redness, swelling, drainage, foreign body noted on exam. No neurological deficits. -     Amb External Referral To Ophthalmology    Type 2 diabetes mellitus with diabetic neuropathy, without long-term current use of insulin ([address]  Unclear if hypoglycemic episodes are occurring- will send order for new glucometer. Asymptomatic in office. Due for updated A1C.   -     insulin glargine (BASAGLAR KWIKPEN) 100 UNIT/ML injection pen; Inject 15 Units into the skin nightly  -     Dulaglutide 3 MG/0.5ML SOPN; Inject 3 mg into the skin once a week  -     Basic Metabolic Panel; Future  -     Hemoglobin A1C; Future  -     Amb External Referral To Ophthalmology  -     glucose monitoring (FREESTYLE FREEDOM) kit; 1 kit by Does not apply route daily  -     blood glucose monitor strips; Test 2 times a day & as needed for symptoms of irregular blood glucose. Dispense sufficient amount for indicated testing frequency plus additional to accommodate PRN testing needs. -     Lancets MISC; 1 each by Does not apply route daily    Severe episode of recurrent major depressive disorder, without psychotic features (HCC)  -     FLUoxetine (PROZAC) 40 MG capsule; Take 2 capsules by mouth once daily    Elevated platelet count  -     CBC with Auto Differential; Future  -     Path Review, Smear; Future        Return in about 2 weeks (around 5/8/2023), or follow up nurse visit thursday of this week with Kayla Walker. Message sent to  Tom Lezama to inform her that patient is still interested in assisted living- this is a safer option due to medication management, transportation availability for appointments and environmental safety as patient's home is in severe disrepair per patient.

## 2023-04-24 NOTE — TELEPHONE ENCOUNTER
Per Mary vision loss right appt asap Dr Avila Loya office.  Spoke with Dr Polo Blake office scheduled appt 4/24/23 Dr Christine Spence 10:25 am.

## 2023-04-24 NOTE — CARE COORDINATION
SW called [facility] and spoke with Kurt Redd, intake. Placed referral for pt to be assessed for [place]. Willian Hayward took needed information and will follow up with pt.  Pt was notified of this referral.

## 2023-04-24 NOTE — CARE COORDINATION
SW called pt after referral from PCP office. SW left msg on pt vm informing him I will place referral for [place]. Advised pt to call with any additional needs or concerns.

## 2023-04-25 ENCOUNTER — HOSPITAL ENCOUNTER (OUTPATIENT)
Dept: WOUND CARE | Age: 43
Discharge: HOME | End: 2023-04-25
Payer: MEDICARE

## 2023-04-25 VITALS
DIASTOLIC BLOOD PRESSURE: 81 MMHG | HEART RATE: 100 BPM | RESPIRATION RATE: 18 BRPM | TEMPERATURE: 97.8 F | SYSTOLIC BLOOD PRESSURE: 140 MMHG

## 2023-04-25 DIAGNOSIS — L97.522 DIABETIC ULCER OF TOE OF LEFT FOOT ASSOCIATED WITH TYPE 2 DIABETES MELLITUS, WITH FAT LAYER EXPOSED (HCC): ICD-10-CM

## 2023-04-25 DIAGNOSIS — E11.621 DIABETIC ULCER OF LEFT MIDFOOT ASSOCIATED WITH TYPE 2 DIABETES MELLITUS, WITH FAT LAYER EXPOSED (HCC): Primary | ICD-10-CM

## 2023-04-25 DIAGNOSIS — E11.40 TYPE 2 DIABETES MELLITUS WITH DIABETIC NEUROPATHY, WITHOUT LONG-TERM CURRENT USE OF INSULIN (HCC): Primary | ICD-10-CM

## 2023-04-25 DIAGNOSIS — E11.621 DIABETIC ULCER OF TOE OF LEFT FOOT ASSOCIATED WITH TYPE 2 DIABETES MELLITUS, WITH FAT LAYER EXPOSED (HCC): ICD-10-CM

## 2023-04-25 DIAGNOSIS — L97.422 DIABETIC ULCER OF LEFT MIDFOOT ASSOCIATED WITH TYPE 2 DIABETES MELLITUS, WITH FAT LAYER EXPOSED (HCC): Primary | ICD-10-CM

## 2023-04-25 DIAGNOSIS — L03.116 CELLULITIS OF LEFT FOOT: ICD-10-CM

## 2023-04-25 DIAGNOSIS — Z89.432 PARTIAL NONTRAUMATIC AMPUTATION OF FOOT, LEFT (HCC): ICD-10-CM

## 2023-04-25 LAB
EST. AVERAGE GLUCOSE BLD GHB EST-MCNC: 292 MG/DL
HBA1C MFR BLD: 11.8 %

## 2023-04-25 PROCEDURE — 11042 DBRDMT SUBQ TIS 1ST 20SQCM/<: CPT | Performed by: NURSE PRACTITIONER

## 2023-04-25 PROCEDURE — 11042 DBRDMT SUBQ TIS 1ST 20SQCM/<: CPT

## 2023-04-25 RX ORDER — CLOBETASOL PROPIONATE 0.5 MG/G
OINTMENT TOPICAL ONCE
OUTPATIENT
Start: 2023-04-25 | End: 2023-04-25

## 2023-04-25 RX ORDER — GENTAMICIN SULFATE 1 MG/G
OINTMENT TOPICAL ONCE
OUTPATIENT
Start: 2023-04-25 | End: 2023-04-25

## 2023-04-25 RX ORDER — GINSENG 100 MG
CAPSULE ORAL ONCE
OUTPATIENT
Start: 2023-04-25 | End: 2023-04-25

## 2023-04-25 RX ORDER — LIDOCAINE 40 MG/G
CREAM TOPICAL ONCE
OUTPATIENT
Start: 2023-04-25 | End: 2023-04-25

## 2023-04-25 RX ORDER — BACITRACIN, NEOMYCIN, POLYMYXIN B 400; 3.5; 5 [USP'U]/G; MG/G; [USP'U]/G
OINTMENT TOPICAL ONCE
OUTPATIENT
Start: 2023-04-25 | End: 2023-04-25

## 2023-04-25 RX ORDER — BACITRACIN ZINC AND POLYMYXIN B SULFATE 500; 1000 [USP'U]/G; [USP'U]/G
OINTMENT TOPICAL ONCE
OUTPATIENT
Start: 2023-04-25 | End: 2023-04-25

## 2023-04-25 RX ORDER — GLUCOSAMINE HCL/CHONDROITIN SU 500-400 MG
CAPSULE ORAL
Qty: 100 STRIP | Refills: 5 | Status: SHIPPED | OUTPATIENT
Start: 2023-04-25

## 2023-04-25 RX ORDER — LIDOCAINE 50 MG/G
OINTMENT TOPICAL ONCE
OUTPATIENT
Start: 2023-04-25 | End: 2023-04-25

## 2023-04-25 RX ORDER — LIDOCAINE HYDROCHLORIDE 20 MG/ML
JELLY TOPICAL ONCE
OUTPATIENT
Start: 2023-04-25 | End: 2023-04-25

## 2023-04-25 RX ORDER — BETAMETHASONE DIPROPIONATE 0.05 %
OINTMENT (GRAM) TOPICAL ONCE
OUTPATIENT
Start: 2023-04-25 | End: 2023-04-25

## 2023-04-25 RX ORDER — LIDOCAINE HYDROCHLORIDE 40 MG/ML
SOLUTION TOPICAL ONCE
OUTPATIENT
Start: 2023-04-25 | End: 2023-04-25

## 2023-04-25 ASSESSMENT — PAIN SCALES - GENERAL: PAINLEVEL_OUTOF10: 0

## 2023-04-25 ASSESSMENT — PAIN SCALES - WONG BAKER: WONGBAKER_NUMERICALRESPONSE: 0

## 2023-04-25 NOTE — PROGRESS NOTES
Multilayer Compression Wrap   (Not Unna) Below the Knee    NAME:  Sharmila Sánchez OF BIRTH:  1980  MEDICAL RECORD NUMBER:  [id number]  DATE:  4/25/2023    Multilayer compression wrap: Removed old Multilayer wrap if indicated and wash leg with mild soap/water. Applied moisturizing agent to dry skin as needed. Applied primary and secondary dressing as ordered. Applied multilayered dressing below the knee to left lower leg. Instructed patient/caregiver not to remove dressing and to keep it clean and dry. Instructed patient/caregiver on complications to report to provider, such as pain, numbness in toes, heavy drainage, and slippage of dressing. Instructed patient on purpose of compression dressing and on activity and exercise recommendations.       Electronically signed by Lizzeth Terrell LPN on 5/67/9632 at 3:75 AM
Description Serosanguinous 04/04/23 0919   Odor None 04/25/23 0804   Rosalie-wound Assessment Intact 04/25/23 0804   Margins Undefined edges 04/25/23 0804   Wound Thickness Description not for Pressure Injury Full thickness 04/25/23 0804   Number of days: 27     Total  Area  Debrided: 0.04 sq cm     Bleeding:  Minimal    Hemostasis Achieved:  by pressure    Procedural Pain:  0  / 10     Post Procedural Pain:  0 / 10     Response to treatment:  Well tolerated by patient. Status of wound progress and description from last visit: Almost healed, regimen as below, follow up in one week. Prosthetic rep has been working with him and he has a stump , will progress with prosthetic fitting. The patient continues to orellana depression post his mother's death. Plan:       Discharge Instructions         PHYSICIAN ORDERS AND DISCHARGE INSTRUCTIONS     NOTE: Upon discharge from the [address], you will receive a patient experience survey. We would be grateful if you would take the time to fill this survey out. Wound cleansing:              Do not scrub or use excessive force. Wash hands with soap and water before and after dressing changes. Prior to applying a clean dressing, cleanse wound with normal saline, wound cleanser, or mild soap and water. Ask the physician or nurse before getting the wound(s) wet in a shower     Daily Wound management:              Keep weight off wounds and reposition every 2 hours. Avoid standing for long periods of time. Apply wraps/stockings in AM and remove at bedtime. If swelling is present, elevate legs to the level of the heart or above for 30 minutes 4-5 times a day and/or when sitting. When taking antibiotics take entire prescription as ordered by physician do not stop taking until medicine is all gone.                               Wound Care Notes:  Rx: Morgan Bridges

## 2023-04-25 NOTE — DISCHARGE INSTRUCTIONS
PHYSICIAN ORDERS AND DISCHARGE INSTRUCTIONS     NOTE: Upon discharge from the [address], you will receive a patient experience survey. We would be grateful if you would take the time to fill this survey out. Wound cleansing:              Do not scrub or use excessive force. Wash hands with soap and water before and after dressing changes. Prior to applying a clean dressing, cleanse wound with normal saline, wound cleanser, or mild soap and water. Ask the physician or nurse before getting the wound(s) wet in a shower     Daily Wound management:              Keep weight off wounds and reposition every 2 hours. Avoid standing for long periods of time. Apply wraps/stockings in AM and remove at bedtime. If swelling is present, elevate legs to the level of the heart or above for 30 minutes 4-5 times a day and/or when sitting. When taking antibiotics take entire prescription as ordered by physician do not stop taking until medicine is all gone. Wound Care Notes:  Rx: David Smith  Apply for grafts 05/11/22: aLL GRAFTS approved 06/08/22 for Left Toe amp site  Reapply for graft 08/22/22 for Left toes amp site   YADY's   Right 1.07      Left    1.1           Date: 08/15/22   Home Care Discontinued. Needs Nurse visit         Grafts Left Foot Amp Site  Puraply #1 Graft #1 4x4 fenestrated 06/15/22   Puraply #2 Graft #2 4x4 fenestrated 06/22/22                                    Orders for this week: 4/25/23      Left Plantar Foot - Wash with soap and water, pat dry. Apply Anasept and Shwetha to wound bed. Cover with Ioplex and ABD. Moisturize plantar foot with A&D. Wrap with Coban 2 Lite to mid shin. Leave in place for 1 week. Be sure to keep dry. Right amp site -   Cover with Silicone border. Wear  to help hold dressing in place.   Leave dressing in place for 1

## 2023-04-27 ENCOUNTER — NURSE ONLY (OUTPATIENT)
Dept: FAMILY MEDICINE CLINIC | Age: 43
End: 2023-04-27

## 2023-04-27 VITALS — OXYGEN SATURATION: 98 % | SYSTOLIC BLOOD PRESSURE: 142 MMHG | HEART RATE: 99 BPM | DIASTOLIC BLOOD PRESSURE: 88 MMHG

## 2023-04-27 DIAGNOSIS — I10 PRIMARY HYPERTENSION: Primary | ICD-10-CM

## 2023-04-27 PROCEDURE — 99999 PR OFFICE/OUTPT VISIT,PROCEDURE ONLY: CPT

## 2023-05-01 ENCOUNTER — CARE COORDINATION (OUTPATIENT)
Dept: CARE COORDINATION | Age: 43
End: 2023-05-01

## 2023-05-01 NOTE — CARE COORDINATION
SW called pt to discuss needs of AL. Pt unavailable at the time of my call and vm was left introducing self and my role requesting a call back to [phone].

## 2023-05-02 ENCOUNTER — HOSPITAL ENCOUNTER (OUTPATIENT)
Dept: WOUND CARE | Age: 43
Discharge: HOME | End: 2023-05-02
Payer: MEDICARE

## 2023-05-02 VITALS
TEMPERATURE: 96.9 F | HEART RATE: 90 BPM | RESPIRATION RATE: 18 BRPM | SYSTOLIC BLOOD PRESSURE: 167 MMHG | DIASTOLIC BLOOD PRESSURE: 90 MMHG

## 2023-05-02 DIAGNOSIS — E11.621 DIABETIC ULCER OF TOE OF LEFT FOOT ASSOCIATED WITH TYPE 2 DIABETES MELLITUS, WITH FAT LAYER EXPOSED (HCC): ICD-10-CM

## 2023-05-02 DIAGNOSIS — L97.422 DIABETIC ULCER OF LEFT MIDFOOT ASSOCIATED WITH TYPE 2 DIABETES MELLITUS, WITH FAT LAYER EXPOSED (HCC): Primary | ICD-10-CM

## 2023-05-02 DIAGNOSIS — Z89.432 PARTIAL NONTRAUMATIC AMPUTATION OF FOOT, LEFT (HCC): ICD-10-CM

## 2023-05-02 DIAGNOSIS — E11.621 DIABETIC ULCER OF LEFT MIDFOOT ASSOCIATED WITH TYPE 2 DIABETES MELLITUS, WITH FAT LAYER EXPOSED (HCC): Primary | ICD-10-CM

## 2023-05-02 DIAGNOSIS — L97.522 DIABETIC ULCER OF TOE OF LEFT FOOT ASSOCIATED WITH TYPE 2 DIABETES MELLITUS, WITH FAT LAYER EXPOSED (HCC): ICD-10-CM

## 2023-05-02 DIAGNOSIS — L03.116 CELLULITIS OF LEFT FOOT: ICD-10-CM

## 2023-05-02 PROCEDURE — 29581 APPL MULTLAYER CMPRN SYS LEG: CPT

## 2023-05-02 RX ORDER — LIDOCAINE HYDROCHLORIDE 40 MG/ML
SOLUTION TOPICAL ONCE
OUTPATIENT
Start: 2023-05-02 | End: 2023-05-02

## 2023-05-02 RX ORDER — LIDOCAINE 50 MG/G
OINTMENT TOPICAL ONCE
OUTPATIENT
Start: 2023-05-02 | End: 2023-05-02

## 2023-05-02 RX ORDER — CLOBETASOL PROPIONATE 0.5 MG/G
OINTMENT TOPICAL ONCE
OUTPATIENT
Start: 2023-05-02 | End: 2023-05-02

## 2023-05-02 RX ORDER — LIDOCAINE 40 MG/G
CREAM TOPICAL ONCE
OUTPATIENT
Start: 2023-05-02 | End: 2023-05-02

## 2023-05-02 RX ORDER — GENTAMICIN SULFATE 1 MG/G
OINTMENT TOPICAL ONCE
OUTPATIENT
Start: 2023-05-02 | End: 2023-05-02

## 2023-05-02 RX ORDER — BACITRACIN, NEOMYCIN, POLYMYXIN B 400; 3.5; 5 [USP'U]/G; MG/G; [USP'U]/G
OINTMENT TOPICAL ONCE
OUTPATIENT
Start: 2023-05-02 | End: 2023-05-02

## 2023-05-02 RX ORDER — BETAMETHASONE DIPROPIONATE 0.05 %
OINTMENT (GRAM) TOPICAL ONCE
OUTPATIENT
Start: 2023-05-02 | End: 2023-05-02

## 2023-05-02 RX ORDER — LIDOCAINE HYDROCHLORIDE 20 MG/ML
JELLY TOPICAL ONCE
OUTPATIENT
Start: 2023-05-02 | End: 2023-05-02

## 2023-05-02 RX ORDER — GINSENG 100 MG
CAPSULE ORAL ONCE
OUTPATIENT
Start: 2023-05-02 | End: 2023-05-02

## 2023-05-02 RX ORDER — BACITRACIN ZINC AND POLYMYXIN B SULFATE 500; 1000 [USP'U]/G; [USP'U]/G
OINTMENT TOPICAL ONCE
OUTPATIENT
Start: 2023-05-02 | End: 2023-05-02

## 2023-05-02 ASSESSMENT — PAIN SCALES - GENERAL: PAINLEVEL_OUTOF10: 0

## 2023-05-02 NOTE — PLAN OF CARE
Problem: Chronic Conditions and Co-morbidities  Goal: Patient's chronic conditions and co-morbidity symptoms are monitored and maintained or improved  5/2/2023 0841 by Bethany Haynes RN  Outcome: Progressing  5/2/2023 0841 by Bethany Haynes RN  Outcome: Progressing     Problem: Wound:  Goal: Will show signs of wound healing; wound closure and no evidence of infection  Description: Will show signs of wound healing; wound closure and no evidence of infection  5/2/2023 0841 by Bethany Haynes RN  Outcome: Progressing  5/2/2023 0841 by Bethany Haynes RN  Outcome: Progressing

## 2023-05-02 NOTE — DISCHARGE INSTRUCTIONS
PHYSICIAN ORDERS AND DISCHARGE INSTRUCTIONS     NOTE: Upon discharge from the [address], you will receive a patient experience survey. We would be grateful if you would take the time to fill this survey out. Wound cleansing:              Do not scrub or use excessive force. Wash hands with soap and water before and after dressing changes. Prior to applying a clean dressing, cleanse wound with normal saline, wound cleanser, or mild soap and water. Ask the physician or nurse before getting the wound(s) wet in a shower     Daily Wound management:              Keep weight off wounds and reposition every 2 hours. Avoid standing for long periods of time. Apply wraps/stockings in AM and remove at bedtime. If swelling is present, elevate legs to the level of the heart or above for 30 minutes 4-5 times a day and/or when sitting. When taking antibiotics take entire prescription as ordered by physician do not stop taking until medicine is all gone. Wound Care Notes:  Rx: Helena Gorman  Apply for grafts 05/11/22: aLL GRAFTS approved 06/08/22 for Left Toe amp site  Reapply for graft 08/22/22 for Left toes amp site   YADY's   Right 1.07      Left    1.1           Date: 08/15/22   Home Care Discontinued. Needs Nurse visit         Grafts Left Foot Amp Site  Puraply #1 Graft #1 4x4 fenestrated 06/15/22   Puraply #2 Graft #2 4x4 fenestrated 06/22/22                                    Orders for this week: 5/2/23 5/2/23 left plantar foot apply A&D ointment and wrap with coban 2 lite and tape. Hold for next visit   Left Plantar Foot - Wash with soap and water, pat dry. Apply Anasept and Shwetha to wound bed. Cover with Ioplex and ABD. Moisturize plantar foot with A&D. Wrap with Coban 2 Lite to mid shin. Leave in place for 1 week. Be sure to keep dry.      Right amp site -

## 2023-05-02 NOTE — PROGRESS NOTES
Multilayer Compression Wrap   (Not Unna) Below the Knee    NAME:  Anival Keane OF BIRTH:  1980  MEDICAL RECORD NUMBER:  [id number]  DATE:  5/2/2023    Multilayer compression wrap: Removed old Multilayer wrap if indicated and wash leg with mild soap/water. Applied moisturizing agent to dry skin as needed. Applied primary and secondary dressing as ordered. Applied multilayered dressing below the knee to left lower leg. Instructed patient/caregiver not to remove dressing and to keep it clean and dry. Instructed patient/caregiver on complications to report to provider, such as pain, numbness in toes, heavy drainage, and slippage of dressing. Instructed patient on purpose of compression dressing and on activity and exercise recommendations.       Electronically signed by Ayana Corcoran RN on 5/2/2023 at 8:40 AM

## 2023-05-02 NOTE — PROGRESS NOTES
Wound Care Center Progress Note With Procedure    Nicole Reza  AGE: 43 y.o. GENDER: male  : 1980  EPISODE DATE:  2023     Subjective:       CHIEF COMPLAINT  WOUND   Chief Complaint   Patient presents with    Wound Check         HISTORY of PRESENT ILLNESS      Nicole Reza is a 43 y.o. male who presents today for wound evaluation of Chronic diabetic, pressure and non-healing surgical ulcer(s) of the left medial foot and lateral plantar surface. The ulcer is of marked severity. The underlying cause of the wound is diabetes, non-healing surgical. He presents today with a new wound to the right plantar foot that is diabetic and pressure in nature and of moderate severity. The patient has significant underlying medical conditions as below. The patient is having significant depression related to the recent and sudden death of his mother. 23: Patient has an abscess left shoulder/neck area. The patient was seen by [address] [place] [address] [facility] for a possible spider bite was referred to surgical consult and was to start Keflex and Bactrim. He then went to the ED 23 with I&D abscess. Today the abscess has not been completely evacuated with repeat I&D as below. 10/20/22: The patient was here last 10/4/22 and his right BKA site was well approximated, staples removed and steri strips. The prosthetic supplier was with him and plans had started to get fitted. Unfortunately, the patient fell the same night at home and the wound dehisced. He was evaluated at the ED. The patient then was not seen at the wound clinic for a few weeks as he was hospitalized for suicidal ideation. Today able to probe and visibly see bone. Home Health still in place, will apply for a wound vac. Will also start Bactrim. 22: Since his last visit to the wound clinic 22 the patient was hospitalized -22) at [facility] with necrotizing fascitis right lower leg with resulting right BKA.  Arina

## 2023-05-03 DIAGNOSIS — E11.40 TYPE 2 DIABETES MELLITUS WITH DIABETIC NEUROPATHY, WITHOUT LONG-TERM CURRENT USE OF INSULIN (HCC): ICD-10-CM

## 2023-05-04 ENCOUNTER — TELEPHONE (OUTPATIENT)
Dept: FAMILY MEDICINE CLINIC | Age: 43
End: 2023-05-04

## 2023-05-04 DIAGNOSIS — R11.2 INTRACTABLE VOMITING WITH NAUSEA: ICD-10-CM

## 2023-05-04 DIAGNOSIS — K21.9 GASTROESOPHAGEAL REFLUX DISEASE WITHOUT ESOPHAGITIS: ICD-10-CM

## 2023-05-04 DIAGNOSIS — F41.9 ANXIETY: ICD-10-CM

## 2023-05-04 DIAGNOSIS — E78.5 HYPERLIPIDEMIA, UNSPECIFIED HYPERLIPIDEMIA TYPE: ICD-10-CM

## 2023-05-04 DIAGNOSIS — I82.452 ACUTE DEEP VEIN THROMBOSIS (DVT) OF LEFT PERONEAL VEIN (HCC): ICD-10-CM

## 2023-05-04 DIAGNOSIS — F10.920 ACUTE ALCOHOLIC INTOXICATION WITHOUT COMPLICATION (HCC): ICD-10-CM

## 2023-05-04 DIAGNOSIS — I10 ESSENTIAL HYPERTENSION: ICD-10-CM

## 2023-05-04 DIAGNOSIS — E11.40 TYPE 2 DIABETES MELLITUS WITH DIABETIC NEUROPATHY, WITHOUT LONG-TERM CURRENT USE OF INSULIN (HCC): ICD-10-CM

## 2023-05-04 RX ORDER — CLOPIDOGREL BISULFATE 75 MG/1
75 TABLET ORAL DAILY
Qty: 30 TABLET | Refills: 3 | Status: SHIPPED | OUTPATIENT
Start: 2023-05-04

## 2023-05-04 RX ORDER — ONDANSETRON 4 MG/1
4 TABLET, FILM COATED ORAL DAILY PRN
Qty: 30 TABLET | Refills: 0 | Status: SHIPPED | OUTPATIENT
Start: 2023-05-04

## 2023-05-04 RX ORDER — PRAZOSIN HYDROCHLORIDE 1 MG/1
1 CAPSULE ORAL NIGHTLY
Qty: 30 CAPSULE | Refills: 3 | Status: SHIPPED | OUTPATIENT
Start: 2023-05-04

## 2023-05-04 RX ORDER — CARVEDILOL 12.5 MG/1
12.5 TABLET ORAL 2 TIMES DAILY WITH MEALS
Qty: 180 TABLET | Refills: 0 | Status: SHIPPED | OUTPATIENT
Start: 2023-05-04

## 2023-05-04 RX ORDER — METFORMIN HYDROCHLORIDE 500 MG/1
1000 TABLET, EXTENDED RELEASE ORAL 2 TIMES DAILY WITH MEALS
Qty: 120 TABLET | Refills: 3 | Status: SHIPPED | OUTPATIENT
Start: 2023-05-04

## 2023-05-04 RX ORDER — AMLODIPINE BESYLATE 2.5 MG/1
2.5 TABLET ORAL DAILY
Qty: 30 TABLET | Refills: 3 | Status: SHIPPED | OUTPATIENT
Start: 2023-05-04

## 2023-05-04 RX ORDER — INSULIN GLARGINE 100 [IU]/ML
15 INJECTION, SOLUTION SUBCUTANEOUS NIGHTLY
Qty: 5 ADJUSTABLE DOSE PRE-FILLED PEN SYRINGE | Refills: 3 | Status: SHIPPED | OUTPATIENT
Start: 2023-05-04

## 2023-05-04 RX ORDER — LOSARTAN POTASSIUM 100 MG/1
100 TABLET ORAL DAILY
Qty: 30 TABLET | Refills: 3 | Status: SHIPPED | OUTPATIENT
Start: 2023-05-04

## 2023-05-04 RX ORDER — PANTOPRAZOLE SODIUM 40 MG/1
40 TABLET, DELAYED RELEASE ORAL
Qty: 30 TABLET | Refills: 3 | Status: SHIPPED | OUTPATIENT
Start: 2023-05-04

## 2023-05-04 RX ORDER — QUETIAPINE FUMARATE 200 MG/1
200 TABLET, FILM COATED ORAL 2 TIMES DAILY
Qty: 60 TABLET | Refills: 3 | Status: SHIPPED | OUTPATIENT
Start: 2023-05-04

## 2023-05-04 RX ORDER — PEN NEEDLE, DIABETIC 31 G X1/4"
1 NEEDLE, DISPOSABLE MISCELLANEOUS DAILY
Qty: 100 EACH | Refills: 3 | Status: SHIPPED | OUTPATIENT
Start: 2023-05-04

## 2023-05-04 RX ORDER — ATORVASTATIN CALCIUM 40 MG/1
40 TABLET, FILM COATED ORAL NIGHTLY
Qty: 30 TABLET | Refills: 3 | Status: SHIPPED | OUTPATIENT
Start: 2023-05-04 | End: 2023-06-03

## 2023-05-04 NOTE — TELEPHONE ENCOUNTER
CONFIRMED PWITH PT HE DID RECEIVE HIS INCOME.  SWITCHED ALL MEDS TO [address] , CANCELLED RX'S WITH [facility]  Requested Prescriptions     Signed Prescriptions Disp Refills    insulin glargine (BASAGLAR KWIKPEN) 100 UNIT/ML injection pen 5 Adjustable Dose Pre-filled Pen Syringe 3     Sig: Inject 15 Units into the skin nightly     Authorizing Provider: John Richardson     Ordering User: LEAH Conley    Dulaglutide 3 MG/0.5ML SOPN 5 Adjustable Dose Pre-filled Pen Syringe 3     Sig: Inject 3 mg into the skin once a week     Authorizing Provider: John Richardson     Ordering User: LEAH Conley    carvedilol (COREG) 12.5 MG tablet 180 tablet 0     Sig: Take 1 tablet by mouth 2 times daily (with meals)     Authorizing Provider: John Oneil User: LEAH Conley    metFORMIN (GLUCOPHAGE-XR) 500 MG extended release tablet 120 tablet 3     Sig: Take 2 tablets by mouth 2 times daily (with meals)     Authorizing Provider: John Oneil User: LEAH Conley    amLODIPine (NORVASC) 2.5 MG tablet 30 tablet 3     Sig: Take 1 tablet by mouth daily     Authorizing Provider: John Oneil User: LEAH Conley    prazosin (MINIPRESS) 1 MG capsule 30 capsule 3     Sig: Take 1 capsule by mouth nightly     Authorizing Provider: John Oneil User: LEAH Conley    QUEtiapine (SEROQUEL) 200 MG tablet 60 tablet 3     Sig: Take 1 tablet by mouth 2 times daily     Authorizing Provider: John Richardson     Ordering User: LEAH Conley    losartan (COZAAR) 100 MG tablet 30 tablet 3     Sig: Take 1 tablet by mouth daily     Authorizing Provider: John Richardson     [facility] User: Audi York    clopidogrel (PLAVIX) 75 MG tablet 30 tablet 3     Sig: Take 1 tablet by mouth daily     Authorizing Provider: John Oneil User: LEAH Conley    atorvastatin (LIPITOR) 40 MG tablet 30 tablet 3     Sig: Take 1 tablet by mouth nightly     Authorizing Provider: John Oneil User: Audi York

## 2023-05-04 NOTE — TELEPHONE ENCOUNTER
----- Message from Jayjay Thakkar LPN sent at 6/56/1796 10:22 AM EDT -----  Regarding: meds  Call pt re did he get his income & can we switch all meds to [facility] for delivery? ?

## 2023-05-05 ENCOUNTER — CARE COORDINATION (OUTPATIENT)
Dept: CARE COORDINATION | Age: 43
End: 2023-05-05

## 2023-05-05 NOTE — CARE COORDINATION
SW spoke with pt and provided 3 AL facilities Phone # and encouraged pt to call and ask \"his questions. \" Pt wrote down # and said he will call each of them. Informed pt that I will call him next week.

## 2023-05-09 ENCOUNTER — HOSPITAL ENCOUNTER (OUTPATIENT)
Dept: WOUND CARE | Age: 43
Discharge: HOME | End: 2023-05-09
Payer: MEDICARE

## 2023-05-09 VITALS
SYSTOLIC BLOOD PRESSURE: 152 MMHG | DIASTOLIC BLOOD PRESSURE: 90 MMHG | TEMPERATURE: 96.3 F | HEART RATE: 86 BPM | RESPIRATION RATE: 18 BRPM

## 2023-05-09 DIAGNOSIS — L97.422 DIABETIC ULCER OF LEFT MIDFOOT ASSOCIATED WITH TYPE 2 DIABETES MELLITUS, WITH FAT LAYER EXPOSED (HCC): Primary | ICD-10-CM

## 2023-05-09 DIAGNOSIS — E11.621 DIABETIC ULCER OF TOE OF LEFT FOOT ASSOCIATED WITH TYPE 2 DIABETES MELLITUS, WITH FAT LAYER EXPOSED (HCC): ICD-10-CM

## 2023-05-09 DIAGNOSIS — E11.621 DIABETIC ULCER OF LEFT MIDFOOT ASSOCIATED WITH TYPE 2 DIABETES MELLITUS, WITH FAT LAYER EXPOSED (HCC): Primary | ICD-10-CM

## 2023-05-09 DIAGNOSIS — L97.522 DIABETIC ULCER OF TOE OF LEFT FOOT ASSOCIATED WITH TYPE 2 DIABETES MELLITUS, WITH FAT LAYER EXPOSED (HCC): ICD-10-CM

## 2023-05-09 DIAGNOSIS — Z89.432 PARTIAL NONTRAUMATIC AMPUTATION OF FOOT, LEFT (HCC): ICD-10-CM

## 2023-05-09 DIAGNOSIS — Z89.511 S/P BKA (BELOW KNEE AMPUTATION), RIGHT (HCC): ICD-10-CM

## 2023-05-09 DIAGNOSIS — L03.116 CELLULITIS OF LEFT FOOT: ICD-10-CM

## 2023-05-09 PROCEDURE — 11056 PARNG/CUTG B9 HYPRKR LES 2-4: CPT

## 2023-05-09 PROCEDURE — 11056 PARNG/CUTG B9 HYPRKR LES 2-4: CPT | Performed by: NURSE PRACTITIONER

## 2023-05-09 RX ORDER — LIDOCAINE 40 MG/G
CREAM TOPICAL ONCE
OUTPATIENT
Start: 2023-05-09 | End: 2023-05-09

## 2023-05-09 RX ORDER — BETAMETHASONE DIPROPIONATE 0.05 %
OINTMENT (GRAM) TOPICAL ONCE
OUTPATIENT
Start: 2023-05-09 | End: 2023-05-09

## 2023-05-09 RX ORDER — LIDOCAINE HYDROCHLORIDE 20 MG/ML
JELLY TOPICAL ONCE
OUTPATIENT
Start: 2023-05-09 | End: 2023-05-09

## 2023-05-09 RX ORDER — GENTAMICIN SULFATE 1 MG/G
OINTMENT TOPICAL ONCE
OUTPATIENT
Start: 2023-05-09 | End: 2023-05-09

## 2023-05-09 RX ORDER — GINSENG 100 MG
CAPSULE ORAL ONCE
OUTPATIENT
Start: 2023-05-09 | End: 2023-05-09

## 2023-05-09 RX ORDER — CLOBETASOL PROPIONATE 0.5 MG/G
OINTMENT TOPICAL ONCE
OUTPATIENT
Start: 2023-05-09 | End: 2023-05-09

## 2023-05-09 RX ORDER — BACITRACIN, NEOMYCIN, POLYMYXIN B 400; 3.5; 5 [USP'U]/G; MG/G; [USP'U]/G
OINTMENT TOPICAL ONCE
OUTPATIENT
Start: 2023-05-09 | End: 2023-05-09

## 2023-05-09 RX ORDER — LIDOCAINE HYDROCHLORIDE 40 MG/ML
SOLUTION TOPICAL ONCE
OUTPATIENT
Start: 2023-05-09 | End: 2023-05-09

## 2023-05-09 RX ORDER — BACITRACIN ZINC AND POLYMYXIN B SULFATE 500; 1000 [USP'U]/G; [USP'U]/G
OINTMENT TOPICAL ONCE
OUTPATIENT
Start: 2023-05-09 | End: 2023-05-09

## 2023-05-09 RX ORDER — LIDOCAINE 50 MG/G
OINTMENT TOPICAL ONCE
OUTPATIENT
Start: 2023-05-09 | End: 2023-05-09

## 2023-05-09 ASSESSMENT — PAIN SCALES - GENERAL: PAINLEVEL_OUTOF10: 0

## 2023-05-09 NOTE — PROGRESS NOTES
Wound Care Center Progress Note With Procedure    Viji Plummer  AGE: 43 y.o. GENDER: male  : 1980  EPISODE DATE:  2023     Subjective:       CHIEF COMPLAINT  WOUND   Chief Complaint   Patient presents with    Wound Check         HISTORY of PRESENT ILLNESS      Viji Plummer is a 43 y.o. male who presents today for wound evaluation of Chronic diabetic, pressure and non-healing surgical ulcer(s) of the left medial foot and lateral plantar surface. The ulcer is of marked severity. The underlying cause of the wound is diabetes, non-healing surgical. He presents today with a new wound to the right plantar foot that is diabetic and pressure in nature and of moderate severity. The patient has significant underlying medical conditions as below. The patient is having significant depression related to the recent and sudden death of his mother. 23: Patient has an abscess left shoulder/neck area. The patient was seen by [address] [place] [address] [facility] for a possible spider bite was referred to surgical consult and was to start Keflex and Bactrim. He then went to the ED 23 with I&D abscess. Today the abscess has not been completely evacuated with repeat I&D as below. 10/20/22: The patient was here last 10/4/22 and his right BKA site was well approximated, staples removed and steri strips. The prosthetic supplier was with him and plans had started to get fitted. Unfortunately, the patient fell the same night at home and the wound dehisced. He was evaluated at the ED. The patient then was not seen at the wound clinic for a few weeks as he was hospitalized for suicidal ideation. Today able to probe and visibly see bone. Home Health still in place, will apply for a wound vac. Will also start Bactrim. 22: Since his last visit to the wound clinic 22 the patient was hospitalized -22) at [facility] with necrotizing fascitis right lower leg with resulting right BKA.  Arina

## 2023-05-09 NOTE — PROGRESS NOTES
Multilayer Compression Wrap   (Not Unna) Below the Knee    NAME:  Rai Hernandez OF BIRTH:  1980  MEDICAL RECORD NUMBER:  [id number]  DATE:  5/9/2023    Multilayer compression wrap: Removed old Multilayer wrap if indicated and wash leg with mild soap/water. Applied moisturizing agent to dry skin as needed. Applied primary and secondary dressing as ordered. Applied multilayered dressing below the knee to left lower leg. Instructed patient/caregiver not to remove dressing and to keep it clean and dry. Instructed patient/caregiver on complications to report to provider, such as pain, numbness in toes, heavy drainage, and slippage of dressing. Instructed patient on purpose of compression dressing and on activity and exercise recommendations.       Electronically signed by Rickey Arteaga LPN on 1/2/8289 at 8:12 AM

## 2023-05-09 NOTE — DISCHARGE INSTRUCTIONS
PHYSICIAN ORDERS AND DISCHARGE INSTRUCTIONS     NOTE: Upon discharge from the [address], you will receive a patient experience survey. We would be grateful if you would take the time to fill this survey out. Wound cleansing:              Do not scrub or use excessive force. Wash hands with soap and water before and after dressing changes. Prior to applying a clean dressing, cleanse wound with normal saline, wound cleanser, or mild soap and water. Ask the physician or nurse before getting the wound(s) wet in a shower     Daily Wound management:              Keep weight off wounds and reposition every 2 hours. Avoid standing for long periods of time. Apply wraps/stockings in AM and remove at bedtime. If swelling is present, elevate legs to the level of the heart or above for 30 minutes 4-5 times a day and/or when sitting. When taking antibiotics take entire prescription as ordered by physician do not stop taking until medicine is all gone. Wound Care Notes:  Rx: Monica De La Fuente  Apply for grafts 05/11/22: aLL GRAFTS approved 06/08/22 for Left Toe amp site  Reapply for graft 08/22/22 for Left toes amp site   YADY's   Right 1.07      Left    1.1           Date: 08/15/22   Home Care Discontinued. Needs Nurse visit         Grafts Left Foot Amp Site  Puraply #1 Graft #1 4x4 fenestrated 06/15/22   Puraply #2 Graft #2 4x4 fenestrated 06/22/22                                    Orders for this week: 5/9/23     Home Care Physical Therapy referral to [facility] today 5/9/23      Left Plantar Foot - Wash with soap and water, pat dry. Apply A&D to lower foot and leg. Wrap with Coban 2 Lite to mid shin. Leave in place for 1 week. Be sure to keep dry. Right amp site -   A&D to dry skin of amp site. Cover with Silicone border. Wear  to help hold dressing in place.   Leave

## 2023-05-11 ENCOUNTER — OFFICE VISIT (OUTPATIENT)
Dept: FAMILY MEDICINE CLINIC | Age: 43
End: 2023-05-11

## 2023-05-11 VITALS
HEART RATE: 100 BPM | OXYGEN SATURATION: 98 % | SYSTOLIC BLOOD PRESSURE: 104 MMHG | DIASTOLIC BLOOD PRESSURE: 66 MMHG | TEMPERATURE: 98.5 F

## 2023-05-11 DIAGNOSIS — S88.119A BELOW KNEE AMPUTATION (HCC): ICD-10-CM

## 2023-05-11 DIAGNOSIS — I10 ESSENTIAL HYPERTENSION: ICD-10-CM

## 2023-05-11 DIAGNOSIS — E11.40 TYPE 2 DIABETES MELLITUS WITH DIABETIC NEUROPATHY, WITHOUT LONG-TERM CURRENT USE OF INSULIN (HCC): Primary | ICD-10-CM

## 2023-05-11 RX ORDER — INSULIN GLARGINE 100 [IU]/ML
25 INJECTION, SOLUTION SUBCUTANEOUS NIGHTLY
Qty: 5 ADJUSTABLE DOSE PRE-FILLED PEN SYRINGE | Refills: 3
Start: 2023-05-11

## 2023-05-11 ASSESSMENT — ENCOUNTER SYMPTOMS: SHORTNESS OF BREATH: 0

## 2023-05-11 NOTE — PROGRESS NOTES
5/11/2023    [facility]    Chief Complaint   Patient presents with    Other     2 week f/u  - no complaints       HPI  History was obtained from patient. Moe Murrieta is a pleasant 43 y.o. male who presents today for 2 week follow up. He is working to get into a local assisted living. There is a  who is working to get a new application filled out. He states that there will likely be a form faxed to our office to be filled out. He had his first physical therapy session with his prosthetic- he reports that this went really well. He didn't fall or feel unsteady. All of his medications have been delivered by [facility]. He has been compliant with his medications. We are still waiting on CGM to get approved. 1. Type 2 diabetes mellitus with diabetic neuropathy, without long-term current use of insulin ([address]    2. Essential hypertension    3. Below knee amputation (HCC)         REVIEW OF SYMPTOMS    Review of Systems   Respiratory:  Negative for shortness of breath. Cardiovascular:  Negative for chest pain and palpitations.      PAST MEDICAL HISTORY  Past Medical History:   Diagnosis Date    Abscess of left foot 4/22/2022    Anemia associated with acute blood loss 4/26/2022    Callus of foot     Right foot - see's Dr. Adriano Palafox    Cellulitis of left foot 4/22/2022    Diabetic ulcer of left midfoot associated with type 2 diabetes mellitus, with muscle involvement without evidence of necrosis ([address] 4/26/2022    Diabetic ulcer of left midfoot associated with type 2 diabetes mellitus, with necrosis of muscle ([address] 6/7/2021    Diabetic ulcer of right midfoot associated with type 2 diabetes mellitus, with fat layer exposed ([address] 8/11/2020    Dizziness     positional    Essential hypertension     Follows with PCP    Hyperlipidemia     Ketosis ([address] 1/10/2023    Lumbar radiculopathy     Septic embolism ([address] 6/13/2020    Subacute osteomyelitis of left foot ([address] 4/22/2022    Type 2 diabetes mellitus,

## 2023-05-12 ENCOUNTER — TELEPHONE (OUTPATIENT)
Dept: FAMILY MEDICINE CLINIC | Age: 43
End: 2023-05-12

## 2023-05-12 ENCOUNTER — CARE COORDINATION (OUTPATIENT)
Dept: CARE COORDINATION | Age: 43
End: 2023-05-12

## 2023-05-12 NOTE — TELEPHONE ENCOUNTER
Carmen Cantu from [facility] called and stated that patient is going to go to assisted living and she needs current DX, the agency can help patient with financial assistance thru medicaid to help cover the cost.

## 2023-05-12 NOTE — CARE COORDINATION
KOLE called pt to discuss needs. Pt stated Delta Freda from [employer] came and \"did the paperwork for AL. \" Pt received his glucose monitoring for PCP office. Pt agrees things are moving in the right direction. Pt concerned that if he goes to AL \"they will take all my money. \"  Discussed with pt everything he will be getting th, an apt, 3 meals a day, someone taking care of the yard, trips to Appts. Etc. Pt voiced understanding. Pt glad wound vac is off and foot is healed. Active listening provided. Informed pt I will check back next week.     Plan of care:  assist with AL

## 2023-05-12 NOTE — LETTER
[facility]  [address] [facility] [address]    Meir Suarez MD        March 8, 2018    Carlos A Dominguez MD  [address] [facility] [address]    Patient: Mercy Bonner   MR Number: [id number]   YOB: 1980   Date of Visit: 3/8/2018     Dear Dr. Carlos A Dominguez,    Today I saw your patient Mercy Bonner  for the evaluation of left leg pain numbness  As you know Hanna Cooper is a 40year old who since May started to complain of pain and numbness in left leg, now also spreading to right leg. Pain/numbness is worse by standing/walking/sitting/lying down and is continuous. Patient has very physical job at [employer]; he kept working through the pain until 2 weeks ago. Took Gabapentin and Tylenol with little relief  Had PT to no avail. MRI 10-27-17 shows large HNP at L5/S1 left  NE is positive for 3/5 weakness in the S1 innervated muscles (Gastroc/soleus/hamstring) left, decreased sensation S1 dermatome left. He has a + SLRT at 20 degrees left and 40 degrees right. No DTR in both legs (pt has diabetes)  WE recommend surgery to remove the HNP. I showed the MRI to the patient. We discussed the possible complication of surgery including excessive blood loss requiring transfusion, infection that may become meningitis, problem with heart lung and kidney as a result of general anesthesia such as heart attack, pneumonia, kidney shutdown and stroke. We also discussed the possible complication of the operations in and around the spine such as, death, paralysis, weakness on one side or the other of the body, decreased sensation or pain on one side or the other of the body, inability to control bowel/bladder, problems with sexual function, postoperative meningitis, postoperative development of a blood clot that may require another operation, leakage of fluid from the wound that may require another operation.  The unlikely event of blindness following
room air

## 2023-05-12 NOTE — TELEPHONE ENCOUNTER
Uncontrolled dm, frequent wounds, amputee, major depressive disorder. Samantha voiced understanding.

## 2023-05-16 ENCOUNTER — HOSPITAL ENCOUNTER (OUTPATIENT)
Dept: WOUND CARE | Age: 43
Discharge: HOME | End: 2023-05-16
Payer: MEDICARE

## 2023-05-16 VITALS
RESPIRATION RATE: 16 BRPM | HEART RATE: 101 BPM | DIASTOLIC BLOOD PRESSURE: 84 MMHG | TEMPERATURE: 98.4 F | SYSTOLIC BLOOD PRESSURE: 141 MMHG

## 2023-05-16 DIAGNOSIS — L97.522 DIABETIC ULCER OF TOE OF LEFT FOOT ASSOCIATED WITH TYPE 2 DIABETES MELLITUS, WITH FAT LAYER EXPOSED (HCC): ICD-10-CM

## 2023-05-16 DIAGNOSIS — L03.116 CELLULITIS OF LEFT FOOT: ICD-10-CM

## 2023-05-16 DIAGNOSIS — E11.621 DIABETIC ULCER OF LEFT MIDFOOT ASSOCIATED WITH TYPE 2 DIABETES MELLITUS, WITH FAT LAYER EXPOSED (HCC): Primary | ICD-10-CM

## 2023-05-16 DIAGNOSIS — E11.621 DIABETIC ULCER OF TOE OF LEFT FOOT ASSOCIATED WITH TYPE 2 DIABETES MELLITUS, WITH FAT LAYER EXPOSED (HCC): ICD-10-CM

## 2023-05-16 DIAGNOSIS — L97.422 DIABETIC ULCER OF LEFT MIDFOOT ASSOCIATED WITH TYPE 2 DIABETES MELLITUS, WITH FAT LAYER EXPOSED (HCC): Primary | ICD-10-CM

## 2023-05-16 DIAGNOSIS — Z89.432 PARTIAL NONTRAUMATIC AMPUTATION OF FOOT, LEFT (HCC): ICD-10-CM

## 2023-05-16 PROCEDURE — 97597 DBRDMT OPN WND 1ST 20 CM/<: CPT | Performed by: NURSE PRACTITIONER

## 2023-05-16 PROCEDURE — 97597 DBRDMT OPN WND 1ST 20 CM/<: CPT

## 2023-05-16 RX ORDER — LIDOCAINE HYDROCHLORIDE 40 MG/ML
SOLUTION TOPICAL ONCE
OUTPATIENT
Start: 2023-05-16 | End: 2023-05-16

## 2023-05-16 RX ORDER — BACITRACIN ZINC AND POLYMYXIN B SULFATE 500; 1000 [USP'U]/G; [USP'U]/G
OINTMENT TOPICAL ONCE
OUTPATIENT
Start: 2023-05-16 | End: 2023-05-16

## 2023-05-16 RX ORDER — LIDOCAINE 40 MG/G
CREAM TOPICAL ONCE
OUTPATIENT
Start: 2023-05-16 | End: 2023-05-16

## 2023-05-16 RX ORDER — LIDOCAINE HYDROCHLORIDE 20 MG/ML
JELLY TOPICAL ONCE
OUTPATIENT
Start: 2023-05-16 | End: 2023-05-16

## 2023-05-16 RX ORDER — BACITRACIN, NEOMYCIN, POLYMYXIN B 400; 3.5; 5 [USP'U]/G; MG/G; [USP'U]/G
OINTMENT TOPICAL ONCE
OUTPATIENT
Start: 2023-05-16 | End: 2023-05-16

## 2023-05-16 RX ORDER — LIDOCAINE 50 MG/G
OINTMENT TOPICAL ONCE
OUTPATIENT
Start: 2023-05-16 | End: 2023-05-16

## 2023-05-16 RX ORDER — BETAMETHASONE DIPROPIONATE 0.05 %
OINTMENT (GRAM) TOPICAL ONCE
OUTPATIENT
Start: 2023-05-16 | End: 2023-05-16

## 2023-05-16 RX ORDER — GENTAMICIN SULFATE 1 MG/G
OINTMENT TOPICAL ONCE
OUTPATIENT
Start: 2023-05-16 | End: 2023-05-16

## 2023-05-16 RX ORDER — CLOBETASOL PROPIONATE 0.5 MG/G
OINTMENT TOPICAL ONCE
OUTPATIENT
Start: 2023-05-16 | End: 2023-05-16

## 2023-05-16 RX ORDER — GINSENG 100 MG
CAPSULE ORAL ONCE
OUTPATIENT
Start: 2023-05-16 | End: 2023-05-16

## 2023-05-16 NOTE — DISCHARGE INSTRUCTIONS
PHYSICIAN ORDERS AND DISCHARGE INSTRUCTIONS     NOTE: Upon discharge from the [address], you will receive a patient experience survey. We would be grateful if you would take the time to fill this survey out. Wound cleansing:              Do not scrub or use excessive force. Wash hands with soap and water before and after dressing changes. Prior to applying a clean dressing, cleanse wound with normal saline, wound cleanser, or mild soap and water. Ask the physician or nurse before getting the wound(s) wet in a shower     Daily Wound management:              Keep weight off wounds and reposition every 2 hours. Avoid standing for long periods of time. Apply wraps/stockings in AM and remove at bedtime. If swelling is present, elevate legs to the level of the heart or above for 30 minutes 4-5 times a day and/or when sitting. When taking antibiotics take entire prescription as ordered by physician do not stop taking until medicine is all gone. Wound Care Notes:  Rx: Francisco Javier Isles  Apply for grafts 05/11/22: aLL GRAFTS approved 06/08/22 for Left Toe amp site  Reapply for graft 08/22/22 for Left toes amp site   YADY's   Right 1.07      Left    1.1           Date: 08/15/22   Home Care Discontinued. Needs Nurse visit         Grafts Left Foot Amp Site  Puraply #1 Graft #1 4x4 fenestrated 06/15/22   Puraply #2 Graft #2 4x4 fenestrated 06/22/22                                    Orders for this week: 5/16/23     FAX ORDERS TO [facility]! Left Plantar Foot - Wash with soap and water, pat dry. Apply A&D to lower foot and leg. Wrap with Coban 2 Lite to mid shin. Leave in place for 1 week. Be sure to keep dry. Right amp site -   A&D to dry skin of amp site. Cover with Silicone border (frame with tape). Wear  to help hold dressing in place.   Leave dressing in place

## 2023-05-16 NOTE — PROGRESS NOTES
Multilayer Compression Wrap   (Not Unna) Below the Knee    NAME:  Tho Greene OF BIRTH:  1980  MEDICAL RECORD NUMBER:  [id number]  DATE:  5/16/2023    Multilayer compression wrap: Removed old Multilayer wrap if indicated and wash leg with mild soap/water. Applied moisturizing agent to dry skin as needed. Applied primary and secondary dressing as ordered. Applied multilayered dressing below the knee to left lower leg. Instructed patient/caregiver not to remove dressing and to keep it clean and dry. Instructed patient/caregiver on complications to report to provider, such as pain, numbness in toes, heavy drainage, and slippage of dressing. Instructed patient on purpose of compression dressing and on activity and exercise recommendations.       Electronically signed by Francy Wallace LPN on 1/86/8549 at 9:26 AM

## 2023-05-16 NOTE — PROGRESS NOTES
Wound Care Center Progress Note With Procedure    Madan Ho  AGE: 43 y.o. GENDER: male  : 1980  EPISODE DATE:  2023     Subjective:       CHIEF COMPLAINT  WOUND   Chief Complaint   Patient presents with    Wound Check         HISTORY of PRESENT ILLNESS      Madan Ho is a 43 y.o. male who presents today for wound evaluation of Chronic diabetic, pressure and non-healing surgical ulcer(s) of the left medial foot and lateral plantar surface. The ulcer is of marked severity. The underlying cause of the wound is diabetes, non-healing surgical. He presents today with a new wound to the right plantar foot that is diabetic and pressure in nature and of moderate severity. The patient has significant underlying medical conditions as below. The patient is having significant depression related to the recent and sudden death of his mother. RAFAEL Wynne CNP is PCP last seen 23.    23: Patient has an abscess left shoulder/neck area. The patient was seen by [address] [place] [address] [facility] for a possible spider bite was referred to surgical consult and was to start Keflex and Bactrim. He then went to the ED 23 with I&D abscess. Today the abscess has not been completely evacuated with repeat I&D as below. 10/20/22: The patient was here last 10/4/22 and his right BKA site was well approximated, staples removed and steri strips. The prosthetic supplier was with him and plans had started to get fitted. Unfortunately, the patient fell the same night at home and the wound dehisced. He was evaluated at the ED. The patient then was not seen at the wound clinic for a few weeks as he was hospitalized for suicidal ideation. Today able to probe and visibly see bone. Home Health still in place, will apply for a wound vac. Will also start Bactrim.     22: Since his last visit to the wound clinic 22 the patient was hospitalized -22) at [address] [place] [address] with necrotizing fascitis

## 2023-05-17 ENCOUNTER — TELEPHONE (OUTPATIENT)
Dept: FAMILY MEDICINE CLINIC | Age: 43
End: 2023-05-17

## 2023-05-17 DIAGNOSIS — E11.40 TYPE 2 DIABETES MELLITUS WITH DIABETIC NEUROPATHY, WITHOUT LONG-TERM CURRENT USE OF INSULIN (HCC): Primary | ICD-10-CM

## 2023-05-17 RX ORDER — PROCHLORPERAZINE 25 MG/1
1 SUPPOSITORY RECTAL
Qty: 1 EACH | Refills: 3 | Status: SHIPPED | OUTPATIENT
Start: 2023-05-17

## 2023-05-17 RX ORDER — PROCHLORPERAZINE 25 MG/1
1 SUPPOSITORY RECTAL
Qty: 3 EACH | Refills: 5 | Status: SHIPPED | OUTPATIENT
Start: 2023-05-17

## 2023-05-19 ENCOUNTER — CARE COORDINATION (OUTPATIENT)
Dept: CARE COORDINATION | Age: 43
End: 2023-05-19

## 2023-05-19 NOTE — CARE COORDINATION
SW calledpt to follow up with [facility] assessment. For AL. Pt stated he has not heard back form them. Pt provided me with the name Montse Serafin [phone]. Informed pt that I will follow up with this next week to see what the decision is. Pt voiced understanding.

## 2023-05-23 ENCOUNTER — HOSPITAL ENCOUNTER (OUTPATIENT)
Dept: WOUND CARE | Age: 43
Discharge: HOME | End: 2023-05-23
Payer: MEDICARE

## 2023-05-23 VITALS
HEART RATE: 91 BPM | RESPIRATION RATE: 18 BRPM | DIASTOLIC BLOOD PRESSURE: 81 MMHG | TEMPERATURE: 98.2 F | SYSTOLIC BLOOD PRESSURE: 120 MMHG

## 2023-05-23 DIAGNOSIS — Z89.432 PARTIAL NONTRAUMATIC AMPUTATION OF FOOT, LEFT (HCC): ICD-10-CM

## 2023-05-23 DIAGNOSIS — L97.422 DIABETIC ULCER OF LEFT MIDFOOT ASSOCIATED WITH TYPE 2 DIABETES MELLITUS, WITH FAT LAYER EXPOSED (HCC): Primary | ICD-10-CM

## 2023-05-23 DIAGNOSIS — E11.621 DIABETIC ULCER OF LEFT MIDFOOT ASSOCIATED WITH TYPE 2 DIABETES MELLITUS, WITH FAT LAYER EXPOSED (HCC): Primary | ICD-10-CM

## 2023-05-23 DIAGNOSIS — L03.116 CELLULITIS OF LEFT FOOT: ICD-10-CM

## 2023-05-23 DIAGNOSIS — L97.522 DIABETIC ULCER OF TOE OF LEFT FOOT ASSOCIATED WITH TYPE 2 DIABETES MELLITUS, WITH FAT LAYER EXPOSED (HCC): ICD-10-CM

## 2023-05-23 DIAGNOSIS — E11.621 DIABETIC ULCER OF TOE OF LEFT FOOT ASSOCIATED WITH TYPE 2 DIABETES MELLITUS, WITH FAT LAYER EXPOSED (HCC): ICD-10-CM

## 2023-05-23 PROCEDURE — 11055 PARING/CUTG B9 HYPRKER LES 1: CPT | Performed by: NURSE PRACTITIONER

## 2023-05-23 PROCEDURE — 11055 PARING/CUTG B9 HYPRKER LES 1: CPT

## 2023-05-23 RX ORDER — LIDOCAINE HYDROCHLORIDE 20 MG/ML
JELLY TOPICAL ONCE
OUTPATIENT
Start: 2023-05-23 | End: 2023-05-23

## 2023-05-23 RX ORDER — LIDOCAINE 50 MG/G
OINTMENT TOPICAL ONCE
OUTPATIENT
Start: 2023-05-23 | End: 2023-05-23

## 2023-05-23 RX ORDER — BACITRACIN ZINC AND POLYMYXIN B SULFATE 500; 1000 [USP'U]/G; [USP'U]/G
OINTMENT TOPICAL ONCE
OUTPATIENT
Start: 2023-05-23 | End: 2023-05-23

## 2023-05-23 RX ORDER — CLOBETASOL PROPIONATE 0.5 MG/G
OINTMENT TOPICAL ONCE
OUTPATIENT
Start: 2023-05-23 | End: 2023-05-23

## 2023-05-23 RX ORDER — BACITRACIN, NEOMYCIN, POLYMYXIN B 400; 3.5; 5 [USP'U]/G; MG/G; [USP'U]/G
OINTMENT TOPICAL ONCE
OUTPATIENT
Start: 2023-05-23 | End: 2023-05-23

## 2023-05-23 RX ORDER — LIDOCAINE HYDROCHLORIDE 40 MG/ML
SOLUTION TOPICAL ONCE
OUTPATIENT
Start: 2023-05-23 | End: 2023-05-23

## 2023-05-23 RX ORDER — BETAMETHASONE DIPROPIONATE 0.05 %
OINTMENT (GRAM) TOPICAL ONCE
OUTPATIENT
Start: 2023-05-23 | End: 2023-05-23

## 2023-05-23 RX ORDER — GENTAMICIN SULFATE 1 MG/G
OINTMENT TOPICAL ONCE
OUTPATIENT
Start: 2023-05-23 | End: 2023-05-23

## 2023-05-23 RX ORDER — LIDOCAINE 40 MG/G
CREAM TOPICAL ONCE
OUTPATIENT
Start: 2023-05-23 | End: 2023-05-23

## 2023-05-23 RX ORDER — GINSENG 100 MG
CAPSULE ORAL ONCE
OUTPATIENT
Start: 2023-05-23 | End: 2023-05-23

## 2023-05-23 NOTE — DISCHARGE INSTRUCTIONS
PHYSICIAN ORDERS AND DISCHARGE INSTRUCTIONS     NOTE: Upon discharge from the [address], you will receive a patient experience survey. We would be grateful if you would take the time to fill this survey out. Wound cleansing:              Do not scrub or use excessive force. Wash hands with soap and water before and after dressing changes. Prior to applying a clean dressing, cleanse wound with normal saline, wound cleanser, or mild soap and water. Ask the physician or nurse before getting the wound(s) wet in a shower     Daily Wound management:              Keep weight off wounds and reposition every 2 hours. Avoid standing for long periods of time. Apply wraps/stockings in AM and remove at bedtime. If swelling is present, elevate legs to the level of the heart or above for 30 minutes 4-5 times a day and/or when sitting. When taking antibiotics take entire prescription as ordered by physician do not stop taking until medicine is all gone. Wound Care Notes:  Rx: Tim Larsen  Apply for grafts 05/11/22: aLL GRAFTS approved 06/08/22 for Left Toe amp site  Reapply for graft 08/22/22 for Left toes amp site   YADY's   Right 1.07      Left    1.1           Date: 08/15/22   Home Care Discontinued. Needs Nurse visit         Grafts Left Foot Amp Site  Puraply #1 Graft #1 4x4 fenestrated 06/15/22   Puraply #2 Graft #2 4x4 fenestrated 06/22/22                                    Orders for this week: 5/23/23     FAX ORDERS TO [facility]! Left Plantar Foot - Wash with soap and water, pat dry. Apply A&D to lower foot and leg. Right amp site -   A&D to dry skin of amp site. Cover with Silicone border (frame with tape). Wear  to help hold dressing in place. Leave dressing in place for 1 week.      Wound vac discontinued 2/14/23, may return to [facility] visit

## 2023-05-23 NOTE — PROGRESS NOTES
This SN [facility] last week and left message for  to follow up on this patient's completed application that was faxed over for review  mid April, Pt. Reports has not heard anything  as far as determination on being able to get assist. With transportation. Received return call on 5/18/23  from LILY  @ [facility] , advised a denial letter was mailed to Patient  around 4/17/23 , Pt. Does not has full Medicaid; he has [place] Medicaid that helps with Medicare Premiums. This SN left message for pt.  To return call ; had not received call from pt.; saw when he came for clinic visit today and informed of the Medicaid coverage he has as noted above, Pt. V/U.
daily Take 81 mg by mouth daily 30 tablet 2    docusate sodium (COLACE, DULCOLAX) 100 MG CAPS Take 100 mg by mouth daily      [DISCONTINUED] metoclopramide (REGLAN) 10 MG tablet Take 1 tablet by mouth 4 times daily (before meals and nightly) 120 tablet 3    Alcohol Swabs PADS       [DISCONTINUED] pioglitazone (ACTOS) 30 MG tablet Take 1 tablet by mouth daily 90 tablet 1    [DISCONTINUED] acetaminophen (TYLENOL) 325 MG tablet Take 2 tablets by mouth every 4 hours as needed for Pain or Fever 120 tablet 3     No current facility-administered medications on file prior to encounter. REVIEW OF SYSTEMS    Pertinent items are noted in HPI. Constitutional: Negative for systemic symptoms including fever, chills and malaise. Objective:      /81   Pulse 91   Temp 98.2 °F (36.8 °C) (Temporal)   Resp 18     PHYSICAL EXAM      General: The patient is in no acute distress. Mental status:  Patient is appropriate, is  oriented to place and plan of care. Dermatologic exam: Visual inspection of the periwound reveals the skin to be normal in turgor and texture  Wound exam: see wound description below in procedure note      Assessment:     Problem List Items Addressed This Visit          Endocrine    WD-Diabetic ulcer of left midfoot associated with type 2 diabetes mellitus, with fat layer exposed ([address] - Primary    Relevant Orders    Initiate Outpatient Wound Care Protocol    Diabetic ulcer of toe of left foot associated with type 2 diabetes mellitus, with fat layer exposed ([address]    Relevant Orders    Initiate Outpatient Wound Care Protocol       Other    Cellulitis of left foot    Relevant Orders    Initiate Outpatient Wound Care Protocol    WD-Partial nontraumatic amputation of foot, left [facility])    Relevant Orders    Initiate Outpatient Wound Care Protocol     Status of wound progress and description from last visit: The right BKA is healed, moisturizing and protecting.  Main with [facility], [facility] Artifical Limb met

## 2023-05-26 ENCOUNTER — CARE COORDINATION (OUTPATIENT)
Dept: CARE COORDINATION | Age: 43
End: 2023-05-26

## 2023-05-26 NOTE — CARE COORDINATION
KOLE  called pt to follow up with AL needs. Pt unavailable at the time of my call and vm was left requesting a call back to [phone].

## 2023-05-30 ENCOUNTER — HOSPITAL ENCOUNTER (OUTPATIENT)
Dept: WOUND CARE | Age: 43
Discharge: HOME | End: 2023-05-30
Payer: MEDICARE

## 2023-05-30 VITALS
DIASTOLIC BLOOD PRESSURE: 76 MMHG | SYSTOLIC BLOOD PRESSURE: 143 MMHG | TEMPERATURE: 97.1 F | HEART RATE: 101 BPM | RESPIRATION RATE: 18 BRPM

## 2023-05-30 DIAGNOSIS — E11.621 DIABETIC ULCER OF TOE OF LEFT FOOT ASSOCIATED WITH TYPE 2 DIABETES MELLITUS, WITH FAT LAYER EXPOSED (HCC): ICD-10-CM

## 2023-05-30 DIAGNOSIS — L03.116 CELLULITIS OF LEFT FOOT: ICD-10-CM

## 2023-05-30 DIAGNOSIS — L97.422 DIABETIC ULCER OF LEFT MIDFOOT ASSOCIATED WITH TYPE 2 DIABETES MELLITUS, WITH FAT LAYER EXPOSED (HCC): Primary | ICD-10-CM

## 2023-05-30 DIAGNOSIS — T87.89 NON-PRESSURE ULCER OF STUMP OF BELOW KNEE AMPUTATION OF RIGHT LOWER EXTREMITY WITH FAT LAYER EXPOSED (HCC): ICD-10-CM

## 2023-05-30 DIAGNOSIS — E11.621 DIABETIC ULCER OF LEFT MIDFOOT ASSOCIATED WITH TYPE 2 DIABETES MELLITUS, WITH FAT LAYER EXPOSED (HCC): Primary | ICD-10-CM

## 2023-05-30 DIAGNOSIS — L97.522 DIABETIC ULCER OF TOE OF LEFT FOOT ASSOCIATED WITH TYPE 2 DIABETES MELLITUS, WITH FAT LAYER EXPOSED (HCC): ICD-10-CM

## 2023-05-30 DIAGNOSIS — Z89.432 PARTIAL NONTRAUMATIC AMPUTATION OF FOOT, LEFT (HCC): ICD-10-CM

## 2023-05-30 DIAGNOSIS — L97.912 NON-PRESSURE ULCER OF STUMP OF BELOW KNEE AMPUTATION OF RIGHT LOWER EXTREMITY WITH FAT LAYER EXPOSED (HCC): ICD-10-CM

## 2023-05-30 PROCEDURE — 11042 DBRDMT SUBQ TIS 1ST 20SQCM/<: CPT

## 2023-05-30 PROCEDURE — 11042 DBRDMT SUBQ TIS 1ST 20SQCM/<: CPT | Performed by: SURGERY

## 2023-05-30 RX ORDER — LIDOCAINE HYDROCHLORIDE 20 MG/ML
JELLY TOPICAL ONCE
OUTPATIENT
Start: 2023-05-30 | End: 2023-05-30

## 2023-05-30 RX ORDER — BACITRACIN ZINC AND POLYMYXIN B SULFATE 500; 1000 [USP'U]/G; [USP'U]/G
OINTMENT TOPICAL ONCE
OUTPATIENT
Start: 2023-05-30 | End: 2023-05-30

## 2023-05-30 RX ORDER — GINSENG 100 MG
CAPSULE ORAL ONCE
OUTPATIENT
Start: 2023-05-30 | End: 2023-05-30

## 2023-05-30 RX ORDER — GENTAMICIN SULFATE 1 MG/G
OINTMENT TOPICAL ONCE
OUTPATIENT
Start: 2023-05-30 | End: 2023-05-30

## 2023-05-30 RX ORDER — LIDOCAINE 40 MG/G
CREAM TOPICAL ONCE
OUTPATIENT
Start: 2023-05-30 | End: 2023-05-30

## 2023-05-30 RX ORDER — LIDOCAINE 50 MG/G
OINTMENT TOPICAL ONCE
OUTPATIENT
Start: 2023-05-30 | End: 2023-05-30

## 2023-05-30 RX ORDER — BETAMETHASONE DIPROPIONATE 0.05 %
OINTMENT (GRAM) TOPICAL ONCE
OUTPATIENT
Start: 2023-05-30 | End: 2023-05-30

## 2023-05-30 RX ORDER — BACITRACIN, NEOMYCIN, POLYMYXIN B 400; 3.5; 5 [USP'U]/G; MG/G; [USP'U]/G
OINTMENT TOPICAL ONCE
OUTPATIENT
Start: 2023-05-30 | End: 2023-05-30

## 2023-05-30 RX ORDER — LIDOCAINE HYDROCHLORIDE 40 MG/ML
SOLUTION TOPICAL ONCE
OUTPATIENT
Start: 2023-05-30 | End: 2023-05-30

## 2023-05-30 RX ORDER — CLOBETASOL PROPIONATE 0.5 MG/G
OINTMENT TOPICAL ONCE
OUTPATIENT
Start: 2023-05-30 | End: 2023-05-30

## 2023-05-30 ASSESSMENT — PAIN DESCRIPTION - ORIENTATION: ORIENTATION: RIGHT

## 2023-05-30 ASSESSMENT — PAIN - FUNCTIONAL ASSESSMENT: PAIN_FUNCTIONAL_ASSESSMENT: PREVENTS OR INTERFERES SOME ACTIVE ACTIVITIES AND ADLS

## 2023-05-30 ASSESSMENT — PAIN SCALES - GENERAL: PAINLEVEL_OUTOF10: 0

## 2023-05-30 ASSESSMENT — PAIN DESCRIPTION - ONSET: ONSET: ON-GOING

## 2023-05-30 ASSESSMENT — PAIN SCALES - WONG BAKER: WONGBAKER_NUMERICALRESPONSE: 0

## 2023-05-30 ASSESSMENT — PAIN DESCRIPTION - FREQUENCY: FREQUENCY: INTERMITTENT

## 2023-05-30 ASSESSMENT — PAIN DESCRIPTION - PAIN TYPE: TYPE: CHRONIC PAIN

## 2023-05-30 ASSESSMENT — PAIN DESCRIPTION - LOCATION: LOCATION: LEG

## 2023-05-30 ASSESSMENT — PAIN DESCRIPTION - DESCRIPTORS: DESCRIPTORS: TENDER;THROBBING

## 2023-05-30 NOTE — DISCHARGE INSTRUCTIONS
PHYSICIAN ORDERS AND DISCHARGE INSTRUCTIONS     NOTE: Upon discharge from the [address], you will receive a patient experience survey. We would be grateful if you would take the time to fill this survey out. Wound cleansing:              Do not scrub or use excessive force. Wash hands with soap and water before and after dressing changes. Prior to applying a clean dressing, cleanse wound with normal saline, wound cleanser, or mild soap and water. Ask the physician or nurse before getting the wound(s) wet in a shower     Daily Wound management:              Keep weight off wounds and reposition every 2 hours. Avoid standing for long periods of time. Apply wraps/stockings in AM and remove at bedtime. If swelling is present, elevate legs to the level of the heart or above for 30 minutes 4-5 times a day and/or when sitting. When taking antibiotics take entire prescription as ordered by physician do not stop taking until medicine is all gone. Wound Care Notes:  Rx: Shyann Eden  Apply for grafts 05/11/22: aLL GRAFTS approved 06/08/22 for Left Toe amp site  Reapply for graft 08/22/22 for Left toes amp site   YADY's   Right 1.07      Left    1.1           Date: 08/15/22   Home Care Discontinued. Needs Nurse visit         Grafts Left Foot Amp Site  Puraply #1 Graft #1 4x4 fenestrated 06/15/22   Puraply #2 Graft #2 4x4 fenestrated 06/22/22                                    Orders for this week: 5/30/23     FAX ORDERS TO [facility]! Left Plantar Foot - Wash with soap and water, pat dry. Apply A&D to lower foot and leg. Right amp site -   A&D to dry skin of amp site. Cover with Stimulen and Foam border (frame with tape). Leave dressing in place for 1 week. Wound vac discontinued 2/14/23, may return to [facility]          Follow Up Instructions:  At the [facility]

## 2023-05-30 NOTE — PROGRESS NOTES
Wound Care Center Progress Note With Procedure    Bear Lara  AGE: 43 y.o. GENDER: male  : 1980  EPISODE DATE:  2023     Subjective:     Chief Complaint   Patient presents with    Wound Check     BLE         HISTORY of PRESENT ILLNESS      Bear Lara is a 43 y.o. male who presents today for wound evaluation of Acute on chronic diabetic ulcer(s) of the right BKA site and left plantar foot. The ulcer is of mild severity. The underlying cause of the wound is pressure/trauma to the BKA site from his prosthesis. He has plans to have the prosthetic adjusted this week. No open wounds on the left foot, mild callous buildup.     Wound Pain Timing/Severity: none  Quality of pain: N/A  Severity of pain:  0 / 10   Modifying Factors: diabetes, chronic pressure, and decreased mobility  Associated Signs/Symptoms: numbness        PAST MEDICAL HISTORY        Diagnosis Date    Abscess of left foot 2022    Anemia associated with acute blood loss 2022    Callus of foot     Right foot - see's Dr. Scarlett Coronado    Cellulitis of left foot 2022    Diabetic ulcer of left midfoot associated with type 2 diabetes mellitus, with muscle involvement without evidence of necrosis ([address] 2022    Diabetic ulcer of left midfoot associated with type 2 diabetes mellitus, with necrosis of muscle ([address] 2021    Diabetic ulcer of right midfoot associated with type 2 diabetes mellitus, with fat layer exposed ([address] 2020    Dizziness     positional    Essential hypertension     Follows with PCP    Hyperlipidemia     Ketosis ([address] 1/10/2023    Lumbar radiculopathy     Septic embolism ([address] 2020    Subacute osteomyelitis of left foot ([address] 2022    Type 2 diabetes mellitus, with long-term current use of insulin ([address]        PAST SURGICAL HISTORY    Past Surgical History:   Procedure Laterality Date    ACHILLES TENDON SURGERY Right 2021    RIGHT ACHILLES TENDON LENGTHENING REPAIR performed by

## 2023-05-31 NOTE — PROGRESS NOTES
Wound Care Center Progress Note With Procedure    Jadiel Ramos  AGE: 43 y.o. GENDER: male  : 1980  EPISODE DATE:  2023     Subjective:       CHIEF COMPLAINT  WOUND   Chief Complaint   Patient presents with    Wound Check         HISTORY of PRESENT ILLNESS      Jadiel Ramos is a 43 y.o. male who presents today for wound evaluation of Chronic diabetic, pressure and non-healing surgical ulcer(s) of the left medial foot and lateral plantar surface. The ulcer is of marked severity. The underlying cause of the wound is diabetes, non-healing surgical. He presents today with a new wound to the right plantar foot that is diabetic and pressure in nature and of moderate severity. The patient has significant underlying medical conditions as below. The patient is having significant depression related to the recent and sudden death of his mother. 23: Patient has an abscess left shoulder/neck area. The patient was seen by [address] [place] [address] [facility] for a possible spider bite was referred to surgical consult and was to start Keflex and Bactrim. He then went to the ED 23 with I&D abscess. Today the abscess has not been completely evacuated with repeat I&D as below. 10/20/22: The patient was here last 10/4/22 and his right BKA site was well approximated, staples removed and steri strips. The prosthetic supplier was with him and plans had started to get fitted. Unfortunately, the patient fell the same night at home and the wound dehisced. He was evaluated at the ED. The patient then was not seen at the wound clinic for a few weeks as he was hospitalized for suicidal ideation. Today able to probe and visibly see bone. Home Health still in place, will apply for a wound vac. Will also start Bactrim. 22: Since his last visit to the wound clinic 22 the patient was hospitalized -22) at [facility] with necrotizing fascitis right lower leg with resulting right BKA. 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 Gagan Krishnamurthy. She placed a wound VAC to on the stump at that time. On 9/5/2022 Dr. Obinna Grover performed a primary closure of his right BKA stump. Wound Pain Timing/Severity: none  Quality of pain: N/A  Severity of pain:  0 / 10   Modifying Factors: diabetes and chronic pressure  Associated Signs/Symptoms: drainage     Diabetes: Yes, on an oral and insulin regimen  Hemoglobin A1C   Date Value Ref Range Status   01/11/2023 7.6 See comment % Final     Comment:     Comment:  Diagnosis of Diabetes: > or = 6.5%  Increased risk of diabetes (Prediabetes): 5.7-6.4%  Glycemic Control: Nonpregnant Adults: <7.0%                    Pregnant: <6.0%          Diabetes education provided today:    Diabetes pathoetiology, difference between type 1 and type 2 diabetes, and progressive nature of Type 2 DM. Diabetic Neuropathy: signs and therapy. Foot care: advised to wash feet daily, pat dry and apply lotion at night, avoiding between toes. Need to look at feet daily and report to a physician any signs of inflammation or skin damage. Discussed diabetes shoes and socks. Diabetic management related to wound care    Smoking: Never smoker  Obesity:No  Anticoagulant therapy: No  Immunosuppression: No    Patient educated on the 6 essential components necessary for wound healing: Circulation, Debridements, Proper Dressings and Topical Wound Products, Infection Control, Edema Control and Offloading. Patient educated on those factors that negatively effect or impact wound healing: smoking, obesity, uncontrolled diabetes, anticoagulant and immunosuppressive regimens, inadequate nutrition, untreated arterial and venous disease if applicable and measures to manage edema. Nutritional status: well nourished. Discussed need for increased protein and calories for wound healing and good sources of protein (just over 7 grams for every 20 pounds of body weight). Animal-based foods high in protein (meat, poultry, fish, eggs, and dairy foods). Plant based foods high in protein (tofu, lentils, beans, chickpeas, nuts, quinoa and den seeds. Off Loading  Offloading or minimizing or removing weight placed on an area with poor circulation such as diabetic wounds or pressure. This can be achieved with crutches, wheel chair, knee walker etc. Minimizing pressure through partial weight bearing (minimizing the amount of  pressure applied and or the amount of time on the area of pressure) or maintaining a non-weight bearing status can be used to promote and often can be essential for thee wound to heal. Off loading may also need to be achieved for non-weight bearing wounds such as pressure ulcers to the torso. Turning and changing positions frequently, at least every two hours. Use of pressure cushion if sitting up in chair. Skin Care  Keep skin clean and well moisturized , moisturize routinely with ointments for heavier moisturizer needs for extremely dry skin or cracks such as A&D ointment and lotions for a light moisturizer such as CeraVe or Eucerin. If incontinent change incontinence garments as soon as soiled and keeping skin clean and use barrier cream to protect the skin.         PAST MEDICAL HISTORY        Diagnosis Date    Abscess of left foot 4/22/2022    Anemia associated with acute blood loss 4/26/2022    Callus of foot     Right foot - see's Dr. Nakita Anderson    Cellulitis of left foot 4/22/2022    Diabetic ulcer of left midfoot associated with type 2 diabetes mellitus, with muscle involvement without evidence of necrosis ([address] 4/26/2022    Diabetic ulcer of left midfoot associated with type 2 diabetes mellitus, with necrosis of muscle ([address] 6/7/2021    Diabetic ulcer of right midfoot associated with type 2 diabetes mellitus, with fat layer exposed ([address] 8/11/2020    Dizziness     positional    Essential hypertension     Follows with PCP    Hyperlipidemia     Lumbar radiculopathy     Septic embolism ([facility] 6/13/2020    Subacute osteomyelitis of left foot ([facility] 4/22/2022       PAST SURGICAL HISTORY    Past Surgical History:   Procedure Laterality Date    ACHILLES TENDON SURGERY Right 1/8/2021    RIGHT ACHILLES TENDON LENGTHENING REPAIR performed by Emi Ortega DPM at [address]  03/2018    [facility]    DENTAL SURGERY      teeth extractions -half per patient    HERNIA REPAIR Bilateral 5/27/2020    BIALTERAL HERNIA INGUINAL REPAIR performed by Manuela Azul MD at [address] Right 9/2/2022    LEG AMPUTATION BELOW KNEE performed by Corrinne Richardson, MD at [address] [facility] Right 9/5/2022    LEG AMPUTATION BELOW KNEE REVISION performed by Corrinne Richardson, MD at [facility] 2018    MS OFFICE/OUTPT VISIT,PROCEDURE ONLY Left 4/17/2018    L5-S1 HEMILAMINECTOMY, REMOVAL OF DISC LEFT SIDE performed by John Rush MD at [address] Right 1/8/2021    RIGHT TRANSMETATARSAL TOE AMPUTATION performed by Emi Ortega DPM at [address] Left 4/23/2022    LEFT RAY GREAT TOE AMPUTATION performed by Rob Payne MD at [address]    Family History   Problem Relation Age of Onset    Heart Disease Father     Diabetes Father     Diabetes Mother     Heart Disease Mother     Other Mother     Kidney Disease Mother     Heart Attack Mother        SOCIAL HISTORY    Social History     Tobacco Use    Smoking status: Never    Smokeless tobacco: Never   Vaping Use    Vaping Use: Never used   Substance Use Topics    Alcohol use: Yes     Comment: occasionally    Drug use: No       ALLERGIES    No Known Allergies    MEDICATIONS    Current Outpatient Medications on File Prior to Encounter   Medication Sig Dispense Refill    FLUoxetine (PROZAC) 40 MG capsule Take 2 capsules by mouth once daily 180 capsule 1 carvedilol (COREG) 12.5 MG tablet Take 1 tablet by mouth 2 times daily (with meals) 180 tablet 1    metFORMIN (GLUCOPHAGE-XR) 500 MG extended release tablet Take 2 tablets by mouth 2 times daily (with meals) 120 tablet 3    insulin glargine (LANTUS SOLOSTAR) 100 UNIT/ML injection pen Inject 10 Units into the skin nightly 5 Adjustable Dose Pre-filled Pen Syringe 3    Dulaglutide 3 MG/0.5ML SOPN Inject 3 mg into the skin once a week 5 Adjustable Dose Pre-filled Pen Syringe 3    Insulin Pen Needle ([employer] PEN NEEDLES) 31G X 6 MM MISC 1 each by Does not apply route daily 100 each 3    amLODIPine (NORVASC) 2.5 MG tablet Take 1 tablet by mouth daily 30 tablet 5    prazosin (MINIPRESS) 1 MG capsule Take 1 capsule by mouth nightly 30 capsule 5    QUEtiapine (SEROQUEL) 200 MG tablet Take 1 tablet by mouth 2 times daily 60 tablet 5    losartan (COZAAR) 100 MG tablet Take 1 tablet by mouth daily 30 tablet 5    clopidogrel (PLAVIX) 75 MG tablet Take 1 tablet by mouth daily 30 tablet 5    atorvastatin (LIPITOR) 40 MG tablet Take 1 tablet by mouth nightly 30 tablet 5    ondansetron (ZOFRAN) 4 MG tablet Take 1 tablet by mouth daily as needed for Nausea or Vomiting 30 tablet 0    Blood Pressure KIT 1 kit by Does not apply route daily 1 kit 0    pantoprazole (PROTONIX) 40 MG tablet Take 1 tablet by mouth every morning (before breakfast) 30 tablet 5    aspirin 81 MG chewable tablet Take 1 tablet by mouth daily Take 81 mg by mouth daily 30 tablet 2    hydrOXYzine pamoate (VISTARIL) 50 MG capsule Take 50 mg by mouth every 6 hours as needed for Anxiety      docusate sodium (COLACE, DULCOLAX) 100 MG CAPS Take 100 mg by mouth daily      [DISCONTINUED] metoclopramide (REGLAN) 10 MG tablet Take 1 tablet by mouth 4 times daily (before meals and nightly) 120 tablet 3    Alcohol Swabs PADS       blood glucose monitor strips Test 2 times a day & as needed for symptoms of irregular blood glucose.  Dispense sufficient amount for indicated testing frequency plus additional to accommodate PRN testing needs. 100 strip 0    [DISCONTINUED] pioglitazone (ACTOS) 30 MG tablet Take 1 tablet by mouth daily 90 tablet 1    FreeStyle Lancets MISC 1 each by Does not apply route daily 100 each 3    [DISCONTINUED] acetaminophen (TYLENOL) 325 MG tablet Take 2 tablets by mouth every 4 hours as needed for Pain or Fever 120 tablet 3     No current facility-administered medications on file prior to encounter. REVIEW OF SYSTEMS    Pertinent items are noted in HPI. Constitutional: Negative for systemic symptoms including fever, chills and malaise. Objective:      BP (!) 144/88   Pulse 89   Temp 98.2 °F (36.8 °C) (Temporal)   Resp 16     PHYSICAL EXAM      General: The patient is in no acute distress. Mental status:  Patient is appropriate, is  oriented to place and plan of care. Dermatologic exam: Visual inspection of the periwound reveals the skin to be normal in turgor and texture  Wound exam: see wound description below in procedure note      Assessment:     Problem List Items Addressed This Visit          Endocrine    WD-Diabetic ulcer of left midfoot associated with type 2 diabetes mellitus, with fat layer exposed ([address] - Primary    Relevant Orders    Initiate Outpatient Wound Care Protocol    Diabetic ulcer of toe of left foot associated with type 2 diabetes mellitus, with fat layer exposed ([address]    Relevant Orders    Initiate Outpatient Wound Care Protocol       Other    Cellulitis of left foot    Relevant Orders    Initiate Outpatient Wound Care Protocol    WD-Partial nontraumatic amputation of foot, left [facility])    Relevant Orders    Initiate Outpatient Wound Care Protocol     Procedure Note    Indications:  Based on my examination of this patient's wound(s) today, sharp excision into necrotic subcutaneous tissue is required to promote healing and evaluate the extent of previous healing.     Performed by: RAFAEL Garcia - CNP    Consent obtained: Yes    Time out taken:  Yes    Pain Control: Anesthetic  Anesthetic:       Debridement:Excisional Debridement    Using curette the wound(s) was/were sharply debrided down through and including the removal of subcutaneous      Devitalized Tissue Debrided:  fibrin, biofilm, slough, necrotic/eschar, and exudate    Pre Debridement Measurements:  Are located in the Wound Documentation Flow Sheet    All active wounds listed below with today's date are evaluated  Wound(s)    debrided this date include # : 3    Post  Debridement Measurements:  Wound 10/20/22 #3 right amp site. (Active)   Wound Image   01/24/23 0946   Wound Etiology Non-Healing Surgical 02/03/23 0859   Dressing Status New dressing applied 02/07/23 1415   Wound Cleansed Soap and water; Wound cleanser 02/07/23 1313   Dressing/Treatment Other (comment) 02/07/23 1415   Offloading for Diabetic Foot Ulcers Other (comment) 02/07/23 1313   Wound Length (cm) 1 cm 02/07/23 1313   Wound Width (cm) 1 cm 02/07/23 1313   Wound Depth (cm) 0.3 cm 02/07/23 1313   Wound Surface Area (cm^2) 1 cm^2 02/07/23 1313   Change in Wound Size % (l*w) 82.14 02/07/23 1313   Wound Volume (cm^3) 0.3 cm^3 02/07/23 1313   Wound Healing % 87 02/07/23 1313   Post-Procedure Length (cm) 1 cm 02/07/23 1339   Post-Procedure Width (cm) 1 cm 02/07/23 1339   Post-Procedure Depth (cm) 0.3 cm 02/07/23 1339   Post-Procedure Surface Area (cm^2) 1 cm^2 02/07/23 1339   Post-Procedure Volume (cm^3) 0.3 cm^3 02/07/23 1339   Distance Tunneling (cm) 0 cm 02/07/23 1313   Tunneling Position ___ O'Clock 0 02/07/23 1313   Undermining Starts ___ O'Clock 0 02/07/23 1313   Undermining Ends___ O'Clock 0 02/07/23 1313   Undermining Maxium Distance (cm) 0 02/07/23 1313   Wound Assessment Pink/red;Slough; Devitalized tissue 02/07/23 1313   Drainage Amount Moderate 02/07/23 1313   Drainage Description Yellow 02/07/23 1313   Odor None 02/07/23 1313   Rosalie-wound Assessment Blanchable erythema;Fragile 02/07/23 1313   Margins Defined edges 02/07/23 1313   Wound Thickness Description not for Pressure Injury Full thickness 02/07/23 1313   Number of days: 110     Total  Area  Debrided: 1 sq cm     Bleeding:  Minimal    Hemostasis Achieved:  by pressure    Procedural Pain:  0  / 10     Post Procedural Pain:  0 / 10     Response to treatment:  Well tolerated by patient. Status of wound progress and description from last visit: BKA site improving, regimen as below, follow up in one week. Abscess left shoulder/neck with I&D now healed. The patient continues to orellana depression post his mother's death. Plan:       Discharge Instructions         PHYSICIAN ORDERS AND DISCHARGE INSTRUCTIONS     NOTE: Upon discharge from the [address], you will receive a patient experience survey. We would be grateful if you would take the time to fill this survey out. Wound cleansing:              Do not scrub or use excessive force. Wash hands with soap and water before and after dressing changes. Prior to applying a clean dressing, cleanse wound with normal saline, wound cleanser, or mild soap and water. Ask the physician or nurse before getting the wound(s) wet in a shower     Daily Wound management:              Keep weight off wounds and reposition every 2 hours. Avoid standing for long periods of time. Apply wraps/stockings in AM and remove at bedtime. If swelling is present, elevate legs to the level of the heart or above for 30 minutes 4-5 times a day and/or when sitting. When taking antibiotics take entire prescription as ordered by physician do not stop taking until medicine is all gone.                               Wound Care Notes:  Rx: Marilyn Kee  Apply for grafts 05/11/22: aLL GRAFTS approved 06/08/22 for Left Toe amp site  Reapply for graft 08/22/22 for Left toes amp site   YADY's   Right 1.07      Left    1.1 Date: 08/15/22   Home Care Discontinued. Needs Nurse visit         Grafts Left Foot Amp Site  Puraply #1 Graft #1 4x4 fenestrated 06/15/22   Puraply #2 Graft #2 4x4 fenestrated 22                                    Orders for this week: 23      Left Plantar Foot wound -- Clean with soap and water, pat dry. Apply A&D  Wrap with Conform and Coban  Leave in place for 1 week. ((((PLEASE DO NOT REMOVE THE LEFT LEG DRESSING))))        WOUND VAC THERAPY: Right Leg Amp Site wounds (hold vac today in clinic 23)     Today in clinic -  Right amp site Apply Anasept gel and Al to wound bed. Cover with Calcium alginate,   Secure with coban 2 lite, box in stump and go up to above knee. Leave in place for 1 week      [On 2023 wound vac on hold  Ca alginate then DUODERM TO PERIWOUND FOR PROTECTION. PACK WITH AL then BLACK FOAM TO WOUND BED. SECURE VAC DRESSING WITH DRAPE. SET WOUND VAC  CONTINUOUS SUCTION. CANISTER CHANGE WEEKLY OR ACCORDING TO VOLUME OF DRAINAGE. Wrap with ACE wrap, but do not compress tubing against skin. WOUND CARE CENTER WILL CHANGE ON  and  (NEED CANISTER AND BLACK FOAM DRESSING KIT). ]        Nurse visit on   Follow Up Instructions: At the [address] in 1 week: Tuesday   Primary Wound Care Provider: Ilene Live CNP   Call  for any questions or concerns. Central [phone]        Treatment Note Wound 10/20/22 #3 right amp site. -Dressing/Treatment: Other (comment) (anacept gel, al, calcium alginate, coban 2 lite, tape)    Written Patient Dismissal Instructions Given            Electronically signed by RAFAEL Yung CNP on 2023 at 4:28 PM Special Stains Stage 1 - Results: Base On Clearance Noted Above

## 2023-06-02 ENCOUNTER — TELEPHONE (OUTPATIENT)
Dept: FAMILY MEDICINE CLINIC | Age: 43
End: 2023-06-02

## 2023-06-02 DIAGNOSIS — E11.40 TYPE 2 DIABETES MELLITUS WITH DIABETIC NEUROPATHY, WITHOUT LONG-TERM CURRENT USE OF INSULIN (HCC): Primary | ICD-10-CM

## 2023-06-02 RX ORDER — BLOOD-GLUCOSE SENSOR
1 EACH MISCELLANEOUS
Qty: 9 EACH | Refills: 1 | Status: SHIPPED | OUTPATIENT
Start: 2023-06-02

## 2023-06-02 RX ORDER — BLOOD-GLUCOSE SENSOR
1 EACH MISCELLANEOUS
Qty: 1 EACH | Refills: 1 | Status: SHIPPED | OUTPATIENT
Start: 2023-06-02 | End: 2023-08-31

## 2023-06-02 NOTE — TELEPHONE ENCOUNTER
----- Message from Alma Aldana LPN sent at 3/8/7098  4:32 PM EDT -----  Regarding: dme [facility] dexcom 7  Call [facility] supply 4  order dexcom 7

## 2023-06-02 NOTE — TELEPHONE ENCOUNTER
Requested Prescriptions     Signed Prescriptions Disp Refills    Continuous Blood Gluc Sensor (DEXCOM G7 SENSOR) MISC 1 each 1     Si Units by Does not apply route every 3 months     Authorizing Provider: Bobby Wilde     Ordering User: LEAH STARKS    Continuous Blood Gluc Sensor (DEXCOM G7 SENSOR) MISC 9 each 1     Sig: Inject 1 Units into the skin every 10 days     Authorizing Provider: Bobby Wilde     Ordering User: LEAH STARKS       Left detailed message sent cgm to nirav they may contact the pt.

## 2023-06-06 ENCOUNTER — HOSPITAL ENCOUNTER (OUTPATIENT)
Dept: WOUND CARE | Age: 43
Discharge: HOME | End: 2023-06-06
Payer: MEDICARE

## 2023-06-06 VITALS
DIASTOLIC BLOOD PRESSURE: 85 MMHG | RESPIRATION RATE: 18 BRPM | HEART RATE: 100 BPM | SYSTOLIC BLOOD PRESSURE: 146 MMHG | TEMPERATURE: 98.6 F

## 2023-06-06 DIAGNOSIS — E11.621 DIABETIC ULCER OF TOE OF LEFT FOOT ASSOCIATED WITH TYPE 2 DIABETES MELLITUS, WITH FAT LAYER EXPOSED (HCC): ICD-10-CM

## 2023-06-06 DIAGNOSIS — L97.422 DIABETIC ULCER OF LEFT MIDFOOT ASSOCIATED WITH TYPE 2 DIABETES MELLITUS, WITH FAT LAYER EXPOSED (HCC): Primary | ICD-10-CM

## 2023-06-06 DIAGNOSIS — L03.116 CELLULITIS OF LEFT FOOT: ICD-10-CM

## 2023-06-06 DIAGNOSIS — L97.522 DIABETIC ULCER OF TOE OF LEFT FOOT ASSOCIATED WITH TYPE 2 DIABETES MELLITUS, WITH FAT LAYER EXPOSED (HCC): ICD-10-CM

## 2023-06-06 DIAGNOSIS — E11.621 DIABETIC ULCER OF LEFT MIDFOOT ASSOCIATED WITH TYPE 2 DIABETES MELLITUS, WITH FAT LAYER EXPOSED (HCC): Primary | ICD-10-CM

## 2023-06-06 DIAGNOSIS — Z89.432 PARTIAL NONTRAUMATIC AMPUTATION OF FOOT, LEFT (HCC): ICD-10-CM

## 2023-06-06 PROCEDURE — 11042 DBRDMT SUBQ TIS 1ST 20SQCM/<: CPT

## 2023-06-06 PROCEDURE — 11042 DBRDMT SUBQ TIS 1ST 20SQCM/<: CPT | Performed by: NURSE PRACTITIONER

## 2023-06-06 RX ORDER — CLOBETASOL PROPIONATE 0.5 MG/G
OINTMENT TOPICAL ONCE
OUTPATIENT
Start: 2023-06-06 | End: 2023-06-06

## 2023-06-06 RX ORDER — LIDOCAINE HYDROCHLORIDE 40 MG/ML
SOLUTION TOPICAL ONCE
OUTPATIENT
Start: 2023-06-06 | End: 2023-06-06

## 2023-06-06 RX ORDER — BETAMETHASONE DIPROPIONATE 0.05 %
OINTMENT (GRAM) TOPICAL ONCE
OUTPATIENT
Start: 2023-06-06 | End: 2023-06-06

## 2023-06-06 RX ORDER — GENTAMICIN SULFATE 1 MG/G
OINTMENT TOPICAL ONCE
OUTPATIENT
Start: 2023-06-06 | End: 2023-06-06

## 2023-06-06 RX ORDER — BACITRACIN ZINC AND POLYMYXIN B SULFATE 500; 1000 [USP'U]/G; [USP'U]/G
OINTMENT TOPICAL ONCE
OUTPATIENT
Start: 2023-06-06 | End: 2023-06-06

## 2023-06-06 RX ORDER — IBUPROFEN 200 MG
TABLET ORAL ONCE
OUTPATIENT
Start: 2023-06-06 | End: 2023-06-06

## 2023-06-06 RX ORDER — LIDOCAINE 50 MG/G
OINTMENT TOPICAL ONCE
OUTPATIENT
Start: 2023-06-06 | End: 2023-06-06

## 2023-06-06 RX ORDER — LIDOCAINE HYDROCHLORIDE 20 MG/ML
JELLY TOPICAL ONCE
OUTPATIENT
Start: 2023-06-06 | End: 2023-06-06

## 2023-06-06 RX ORDER — LIDOCAINE 40 MG/G
CREAM TOPICAL ONCE
OUTPATIENT
Start: 2023-06-06 | End: 2023-06-06

## 2023-06-06 RX ORDER — GINSENG 100 MG
CAPSULE ORAL ONCE
OUTPATIENT
Start: 2023-06-06 | End: 2023-06-06

## 2023-06-06 ASSESSMENT — PAIN SCALES - GENERAL: PAINLEVEL_OUTOF10: 0

## 2023-06-06 NOTE — DISCHARGE INSTRUCTIONS
PHYSICIAN ORDERS AND DISCHARGE INSTRUCTIONS     NOTE: Upon discharge from the [address], you will receive a patient experience survey. We would be grateful if you would take the time to fill this survey out. Wound cleansing:              Do not scrub or use excessive force. Wash hands with soap and water before and after dressing changes. Prior to applying a clean dressing, cleanse wound with normal saline, wound cleanser, or mild soap and water. Ask the physician or nurse before getting the wound(s) wet in a shower     Daily Wound management:              Keep weight off wounds and reposition every 2 hours. Avoid standing for long periods of time. Apply wraps/stockings in AM and remove at bedtime. If swelling is present, elevate legs to the level of the heart or above for 30 minutes 4-5 times a day and/or when sitting. When taking antibiotics take entire prescription as ordered by physician do not stop taking until medicine is all gone. Wound Care Notes:  Rx: Prisca Butler  Apply for grafts 05/11/22: aLL GRAFTS approved 06/08/22 for Left Toe amp site  Reapply for graft 08/22/22 for Left toes amp site   YADY's   Right 1.07      Left    1.1           Date: 08/15/22   Home Care Discontinued. Needs Nurse visit         Grafts Left Foot Amp Site  Puraply #1 Graft #1 4x4 fenestrated 06/15/22   Puraply #2 Graft #2 4x4 fenestrated 06/22/22                                    Orders for this week: 6/6/23     FAX ORDERS TO [facility]! Left Plantar Foot - Wash with soap and water, pat dry. Apply A&D to lower foot and leg. Right amp site - Wash with soap and water, pat dry. Spray wound bed with Anasept. Cover with Shwetha. Cover with silicone border. Reinforce with tape. Leave dressing in place for 1 week.      Wound vac discontinued 2/14/23, may return to [facility]

## 2023-06-06 NOTE — PROGRESS NOTES
Wound Care Center Progress Note With Procedure    Stefanie Camargo  AGE: 43 y.o. GENDER: male  : 1980  EPISODE DATE:  2023     Subjective:       CHIEF COMPLAINT  WOUND   Chief Complaint   Patient presents with    Wound Check         HISTORY of PRESENT ILLNESS      Stefanie Camargo is a 43 y.o. male who presents today for wound evaluation of Chronic diabetic, pressure and non-healing surgical ulcer(s) of the left medial foot and lateral plantar surface. The ulcer is of marked severity. The underlying cause of the wound is diabetes, non-healing surgical. He presents today with a new wound to the right plantar foot that is diabetic and pressure in nature and of moderate severity. The patient has significant underlying medical conditions as below. The patient is having significant depression related to the recent and sudden death of his mother. RAFAEL Corrales CNP is PCP last seen 23.    23: Patient has an abscess left shoulder/neck area. The patient was seen by [address] [place] [address] Box [facility] for a possible spider bite was referred to surgical consult and was to start Keflex and Bactrim. He then went to the ED 23 with I&D abscess. Today the abscess has not been completely evacuated with repeat I&D as below. 10/20/22: The patient was here last 10/4/22 and his right BKA site was well approximated, staples removed and steri strips. The prosthetic supplier was with him and plans had started to get fitted. Unfortunately, the patient fell the same night at home and the wound dehisced. He was evaluated at the ED. The patient then was not seen at the wound clinic for a few weeks as he was hospitalized for suicidal ideation. Today able to probe and visibly see bone. Home Health still in place, will apply for a wound vac. Will also start Bactrim.     22: Since his last visit to the wound clinic 22 the patient was hospitalized -22) at [facility] with necrotizing fascitis

## 2023-06-20 ENCOUNTER — CARE COORDINATION (OUTPATIENT)
Dept: CARE COORDINATION | Age: 43
End: 2023-06-20

## 2023-06-20 ENCOUNTER — HOSPITAL ENCOUNTER (OUTPATIENT)
Dept: WOUND CARE | Age: 43
Discharge: HOME | End: 2023-06-20
Payer: MEDICARE

## 2023-06-20 VITALS
HEART RATE: 97 BPM | TEMPERATURE: 98 F | DIASTOLIC BLOOD PRESSURE: 72 MMHG | SYSTOLIC BLOOD PRESSURE: 145 MMHG | RESPIRATION RATE: 18 BRPM

## 2023-06-20 DIAGNOSIS — L97.522 DIABETIC ULCER OF TOE OF LEFT FOOT ASSOCIATED WITH TYPE 2 DIABETES MELLITUS, WITH FAT LAYER EXPOSED (HCC): ICD-10-CM

## 2023-06-20 DIAGNOSIS — L03.116 CELLULITIS OF LEFT FOOT: ICD-10-CM

## 2023-06-20 DIAGNOSIS — Z89.432 PARTIAL NONTRAUMATIC AMPUTATION OF FOOT, LEFT (HCC): ICD-10-CM

## 2023-06-20 DIAGNOSIS — E11.621 DIABETIC ULCER OF TOE OF LEFT FOOT ASSOCIATED WITH TYPE 2 DIABETES MELLITUS, WITH FAT LAYER EXPOSED (HCC): ICD-10-CM

## 2023-06-20 DIAGNOSIS — L97.422 DIABETIC ULCER OF LEFT MIDFOOT ASSOCIATED WITH TYPE 2 DIABETES MELLITUS, WITH FAT LAYER EXPOSED (HCC): Primary | ICD-10-CM

## 2023-06-20 DIAGNOSIS — E11.621 DIABETIC ULCER OF LEFT MIDFOOT ASSOCIATED WITH TYPE 2 DIABETES MELLITUS, WITH FAT LAYER EXPOSED (HCC): Primary | ICD-10-CM

## 2023-06-20 PROCEDURE — 11042 DBRDMT SUBQ TIS 1ST 20SQCM/<: CPT | Performed by: SURGERY

## 2023-06-20 PROCEDURE — 11042 DBRDMT SUBQ TIS 1ST 20SQCM/<: CPT

## 2023-06-20 RX ORDER — LIDOCAINE 40 MG/G
CREAM TOPICAL ONCE
OUTPATIENT
Start: 2023-06-20 | End: 2023-06-20

## 2023-06-20 RX ORDER — LIDOCAINE 50 MG/G
OINTMENT TOPICAL ONCE
OUTPATIENT
Start: 2023-06-20 | End: 2023-06-20

## 2023-06-20 RX ORDER — LIDOCAINE HYDROCHLORIDE 20 MG/ML
JELLY TOPICAL ONCE
OUTPATIENT
Start: 2023-06-20 | End: 2023-06-20

## 2023-06-20 RX ORDER — IBUPROFEN 200 MG
TABLET ORAL ONCE
OUTPATIENT
Start: 2023-06-20 | End: 2023-06-20

## 2023-06-20 RX ORDER — BACITRACIN ZINC AND POLYMYXIN B SULFATE 500; 1000 [USP'U]/G; [USP'U]/G
OINTMENT TOPICAL ONCE
OUTPATIENT
Start: 2023-06-20 | End: 2023-06-20

## 2023-06-20 RX ORDER — GENTAMICIN SULFATE 1 MG/G
OINTMENT TOPICAL ONCE
OUTPATIENT
Start: 2023-06-20 | End: 2023-06-20

## 2023-06-20 RX ORDER — CLOBETASOL PROPIONATE 0.5 MG/G
OINTMENT TOPICAL ONCE
OUTPATIENT
Start: 2023-06-20 | End: 2023-06-20

## 2023-06-20 RX ORDER — LIDOCAINE HYDROCHLORIDE 40 MG/ML
SOLUTION TOPICAL ONCE
OUTPATIENT
Start: 2023-06-20 | End: 2023-06-20

## 2023-06-20 RX ORDER — GINSENG 100 MG
CAPSULE ORAL ONCE
OUTPATIENT
Start: 2023-06-20 | End: 2023-06-20

## 2023-06-20 RX ORDER — BETAMETHASONE DIPROPIONATE 0.05 %
OINTMENT (GRAM) TOPICAL ONCE
OUTPATIENT
Start: 2023-06-20 | End: 2023-06-20

## 2023-06-20 ASSESSMENT — PAIN SCALES - GENERAL: PAINLEVEL_OUTOF10: 0

## 2023-06-20 ASSESSMENT — PAIN SCALES - WONG BAKER: WONGBAKER_NUMERICALRESPONSE: 0

## 2023-06-20 NOTE — DISCHARGE INSTRUCTIONS
to [facility]           Follow Up Instructions: At the [address] in 1 week  Primary Wound Care Provider: Yolande Bingham CNP   Call  for any questions or concerns.   Central [phone]

## 2023-06-20 NOTE — PROGRESS NOTES
Wound Care Center Progress Note With Procedure    Toya Dalton  AGE: 43 y.o. GENDER: male  : 1980  EPISODE DATE:  2023     Subjective:     Chief Complaint   Patient presents with    Wound Check     Right amp site. HISTORY of PRESENT ILLNESS      Toya Dalton is a 43 y.o. male who presents today for wound evaluation of Chronic non-healing surgical ulcer(s) of the R BKA sump incision . The ulcer is of mild severity. The underlying cause of the wound is trauma.     Wound Pain Timing/Severity: intermittent  Quality of pain: sharp  Severity of pain:  2 / 10   Modifying Factors: shear force  Associated Signs/Symptoms: pain        PAST MEDICAL HISTORY        Diagnosis Date    Abscess of left foot 2022    Anemia associated with acute blood loss 2022    Callus of foot     Right foot - see's Dr. Primitivo Cuellar    Cellulitis of left foot 2022    Diabetic ulcer of left midfoot associated with type 2 diabetes mellitus, with muscle involvement without evidence of necrosis ([address] 2022    Diabetic ulcer of left midfoot associated with type 2 diabetes mellitus, with necrosis of muscle ([address] 2021    Diabetic ulcer of right midfoot associated with type 2 diabetes mellitus, with fat layer exposed ([address] 2020    Dizziness     positional    Essential hypertension     Follows with PCP    Hyperlipidemia     Ketosis ([address] 1/10/2023    Lumbar radiculopathy     Septic embolism ([address] 2020    Subacute osteomyelitis of left foot ([address] 2022    Type 2 diabetes mellitus, with long-term current use of insulin ([address]        PAST SURGICAL HISTORY    Past Surgical History:   Procedure Laterality Date    ACHILLES TENDON SURGERY Right 2021    RIGHT ACHILLES TENDON LENGTHENING REPAIR performed by jA Cruz DPM at [address]  2018    [facility]    DENTAL SURGERY      teeth extractions -half per patient    HERNIA REPAIR Bilateral 2020    Rosendo Wheeler

## 2023-06-20 NOTE — CARE COORDINATION
SW has a call out to  [address] at [employer] to discuss AL waiver. Left a vm with my contact information requesting a call back. , Jennifer Swanson provided me with her #.

## 2023-06-22 ENCOUNTER — CARE COORDINATION (OUTPATIENT)
Dept: CARE COORDINATION | Age: 43
End: 2023-06-22

## 2023-06-27 ENCOUNTER — HOSPITAL ENCOUNTER (OUTPATIENT)
Dept: WOUND CARE | Age: 43
Discharge: HOME | End: 2023-06-27
Payer: MEDICARE

## 2023-06-27 VITALS
RESPIRATION RATE: 18 BRPM | TEMPERATURE: 97.9 F | SYSTOLIC BLOOD PRESSURE: 148 MMHG | DIASTOLIC BLOOD PRESSURE: 95 MMHG | HEART RATE: 98 BPM

## 2023-06-27 DIAGNOSIS — E11.621 DIABETIC ULCER OF LEFT MIDFOOT ASSOCIATED WITH TYPE 2 DIABETES MELLITUS, WITH FAT LAYER EXPOSED (HCC): Primary | ICD-10-CM

## 2023-06-27 DIAGNOSIS — E11.42 TYPE 2 DIABETES MELLITUS WITH DIABETIC POLYNEUROPATHY, WITH LONG-TERM CURRENT USE OF INSULIN (HCC): ICD-10-CM

## 2023-06-27 DIAGNOSIS — L03.116 CELLULITIS OF LEFT FOOT: ICD-10-CM

## 2023-06-27 DIAGNOSIS — Z79.4 TYPE 2 DIABETES MELLITUS WITH DIABETIC POLYNEUROPATHY, WITH LONG-TERM CURRENT USE OF INSULIN (HCC): ICD-10-CM

## 2023-06-27 DIAGNOSIS — E11.621 DIABETIC ULCER OF TOE OF LEFT FOOT ASSOCIATED WITH TYPE 2 DIABETES MELLITUS, WITH FAT LAYER EXPOSED (HCC): ICD-10-CM

## 2023-06-27 DIAGNOSIS — L97.522 DIABETIC ULCER OF TOE OF LEFT FOOT ASSOCIATED WITH TYPE 2 DIABETES MELLITUS, WITH FAT LAYER EXPOSED (HCC): ICD-10-CM

## 2023-06-27 DIAGNOSIS — Z89.432 PARTIAL NONTRAUMATIC AMPUTATION OF FOOT, LEFT (HCC): ICD-10-CM

## 2023-06-27 DIAGNOSIS — Z89.511 S/P BKA (BELOW KNEE AMPUTATION), RIGHT (HCC): ICD-10-CM

## 2023-06-27 DIAGNOSIS — L97.422 DIABETIC ULCER OF LEFT MIDFOOT ASSOCIATED WITH TYPE 2 DIABETES MELLITUS, WITH FAT LAYER EXPOSED (HCC): Primary | ICD-10-CM

## 2023-06-27 DIAGNOSIS — T87.81 DEHISCENCE OF AMPUTATION STUMP OF RIGHT LOWER EXTREMITY (HCC): ICD-10-CM

## 2023-06-27 PROCEDURE — 11042 DBRDMT SUBQ TIS 1ST 20SQCM/<: CPT

## 2023-06-27 PROCEDURE — 11042 DBRDMT SUBQ TIS 1ST 20SQCM/<: CPT | Performed by: NURSE PRACTITIONER

## 2023-06-27 RX ORDER — LIDOCAINE HYDROCHLORIDE 20 MG/ML
JELLY TOPICAL ONCE
OUTPATIENT
Start: 2023-06-27 | End: 2023-06-27

## 2023-06-27 RX ORDER — LIDOCAINE 40 MG/G
CREAM TOPICAL ONCE
OUTPATIENT
Start: 2023-06-27 | End: 2023-06-27

## 2023-06-27 RX ORDER — IBUPROFEN 200 MG
TABLET ORAL ONCE
OUTPATIENT
Start: 2023-06-27 | End: 2023-06-27

## 2023-06-27 RX ORDER — LIDOCAINE 50 MG/G
OINTMENT TOPICAL ONCE
OUTPATIENT
Start: 2023-06-27 | End: 2023-06-27

## 2023-06-27 RX ORDER — CLOBETASOL PROPIONATE 0.5 MG/G
OINTMENT TOPICAL ONCE
OUTPATIENT
Start: 2023-06-27 | End: 2023-06-27

## 2023-06-27 RX ORDER — GINSENG 100 MG
CAPSULE ORAL ONCE
OUTPATIENT
Start: 2023-06-27 | End: 2023-06-27

## 2023-06-27 RX ORDER — GENTAMICIN SULFATE 1 MG/G
OINTMENT TOPICAL ONCE
OUTPATIENT
Start: 2023-06-27 | End: 2023-06-27

## 2023-06-27 RX ORDER — LIDOCAINE HYDROCHLORIDE 40 MG/ML
SOLUTION TOPICAL ONCE
OUTPATIENT
Start: 2023-06-27 | End: 2023-06-27

## 2023-06-27 RX ORDER — BETAMETHASONE DIPROPIONATE 0.05 %
OINTMENT (GRAM) TOPICAL ONCE
OUTPATIENT
Start: 2023-06-27 | End: 2023-06-27

## 2023-06-27 RX ORDER — BACITRACIN ZINC AND POLYMYXIN B SULFATE 500; 1000 [USP'U]/G; [USP'U]/G
OINTMENT TOPICAL ONCE
OUTPATIENT
Start: 2023-06-27 | End: 2023-06-27

## 2023-06-27 ASSESSMENT — PAIN SCALES - WONG BAKER: WONGBAKER_NUMERICALRESPONSE: 0

## 2023-06-27 ASSESSMENT — PAIN DESCRIPTION - LOCATION: LOCATION: LEG

## 2023-06-27 ASSESSMENT — PAIN DESCRIPTION - ORIENTATION: ORIENTATION: RIGHT

## 2023-06-27 ASSESSMENT — PAIN SCALES - GENERAL: PAINLEVEL_OUTOF10: 1

## 2023-06-27 ASSESSMENT — PAIN DESCRIPTION - DESCRIPTORS: DESCRIPTORS: TENDER

## 2023-07-05 ENCOUNTER — CARE COORDINATION (OUTPATIENT)
Dept: CARE COORDINATION | Age: 43
End: 2023-07-05

## 2023-07-05 NOTE — CARE COORDINATION
KOLE called [place], [employer] and left a vm for [employer] @ [phone], to return my call to discuss pt AL options.

## 2023-07-05 NOTE — CARE COORDINATION
SW called pt to follow up with AL needs and to see if pt heard from [address] from [employer] during SW PTO. Pt stated he has heard, \"nothing. \"  Informed pt that I will contact her again, and we just \"need to be the [address] [place] [address]. \"  Pt voiced understanding.

## 2023-07-06 ENCOUNTER — HOSPITAL ENCOUNTER (OUTPATIENT)
Dept: WOUND CARE | Age: 43
Discharge: HOME | End: 2023-07-06
Payer: MEDICARE

## 2023-07-06 VITALS
HEART RATE: 108 BPM | TEMPERATURE: 98 F | SYSTOLIC BLOOD PRESSURE: 135 MMHG | DIASTOLIC BLOOD PRESSURE: 75 MMHG | RESPIRATION RATE: 18 BRPM

## 2023-07-06 DIAGNOSIS — Z89.432 PARTIAL NONTRAUMATIC AMPUTATION OF FOOT, LEFT (HCC): ICD-10-CM

## 2023-07-06 DIAGNOSIS — L97.422 DIABETIC ULCER OF LEFT MIDFOOT ASSOCIATED WITH TYPE 2 DIABETES MELLITUS, WITH FAT LAYER EXPOSED (HCC): Primary | ICD-10-CM

## 2023-07-06 DIAGNOSIS — E11.621 DIABETIC ULCER OF LEFT MIDFOOT ASSOCIATED WITH TYPE 2 DIABETES MELLITUS, WITH FAT LAYER EXPOSED (HCC): Primary | ICD-10-CM

## 2023-07-06 DIAGNOSIS — L97.522 DIABETIC ULCER OF TOE OF LEFT FOOT ASSOCIATED WITH TYPE 2 DIABETES MELLITUS, WITH FAT LAYER EXPOSED (HCC): ICD-10-CM

## 2023-07-06 DIAGNOSIS — L03.116 CELLULITIS OF LEFT FOOT: ICD-10-CM

## 2023-07-06 DIAGNOSIS — E11.621 DIABETIC ULCER OF TOE OF LEFT FOOT ASSOCIATED WITH TYPE 2 DIABETES MELLITUS, WITH FAT LAYER EXPOSED (HCC): ICD-10-CM

## 2023-07-06 PROCEDURE — 11042 DBRDMT SUBQ TIS 1ST 20SQCM/<: CPT

## 2023-07-06 RX ORDER — BETAMETHASONE DIPROPIONATE 0.05 %
OINTMENT (GRAM) TOPICAL ONCE
OUTPATIENT
Start: 2023-07-06 | End: 2023-07-06

## 2023-07-06 RX ORDER — LIDOCAINE 40 MG/G
CREAM TOPICAL ONCE
OUTPATIENT
Start: 2023-07-06 | End: 2023-07-06

## 2023-07-06 RX ORDER — LIDOCAINE HYDROCHLORIDE 40 MG/ML
SOLUTION TOPICAL ONCE
OUTPATIENT
Start: 2023-07-06 | End: 2023-07-06

## 2023-07-06 RX ORDER — GINSENG 100 MG
CAPSULE ORAL ONCE
OUTPATIENT
Start: 2023-07-06 | End: 2023-07-06

## 2023-07-06 RX ORDER — GENTAMICIN SULFATE 1 MG/G
OINTMENT TOPICAL ONCE
OUTPATIENT
Start: 2023-07-06 | End: 2023-07-06

## 2023-07-06 RX ORDER — CLOBETASOL PROPIONATE 0.5 MG/G
OINTMENT TOPICAL ONCE
OUTPATIENT
Start: 2023-07-06 | End: 2023-07-06

## 2023-07-06 RX ORDER — LIDOCAINE HYDROCHLORIDE 20 MG/ML
JELLY TOPICAL ONCE
OUTPATIENT
Start: 2023-07-06 | End: 2023-07-06

## 2023-07-06 RX ORDER — LIDOCAINE 50 MG/G
OINTMENT TOPICAL ONCE
OUTPATIENT
Start: 2023-07-06 | End: 2023-07-06

## 2023-07-06 RX ORDER — BACITRACIN ZINC AND POLYMYXIN B SULFATE 500; 1000 [USP'U]/G; [USP'U]/G
OINTMENT TOPICAL ONCE
OUTPATIENT
Start: 2023-07-06 | End: 2023-07-06

## 2023-07-06 RX ORDER — IBUPROFEN 200 MG
TABLET ORAL ONCE
OUTPATIENT
Start: 2023-07-06 | End: 2023-07-06

## 2023-07-06 ASSESSMENT — PAIN SCALES - WONG BAKER: WONGBAKER_NUMERICALRESPONSE: 0

## 2023-07-06 ASSESSMENT — PAIN SCALES - GENERAL: PAINLEVEL_OUTOF10: 0

## 2023-07-06 NOTE — DISCHARGE INSTRUCTIONS
PHYSICIAN ORDERS AND DISCHARGE INSTRUCTIONS     NOTE: Upon discharge from the [address] [place] [address], you will receive a patient experience survey. We would be grateful if you would take the time to fill this survey out. Wound cleansing:              Do not scrub or use excessive force. Wash hands with soap and water before and after dressing changes. Prior to applying a clean dressing, cleanse wound with normal saline, wound cleanser, or mild soap and water. Ask the physician or nurse before getting the wound(s) wet in a shower     Daily Wound management:              Keep weight off wounds and reposition every 2 hours. Avoid standing for long periods of time. Apply wraps/stockings in AM and remove at bedtime. If swelling is present, elevate legs to the level of the heart or above for 30 minutes 4-5 times a day and/or when sitting. When taking antibiotics take entire prescription as ordered by physician do not stop taking until medicine is all gone. Wound Care Notes:  Rx: Keith Salguero  Apply for grafts 05/11/22: aLL GRAFTS approved 06/08/22 for Left Toe amp site  Reapply for graft 08/22/22 for Left toes amp site   YADY's   Right 1.07      Left    1.1           Date: 08/15/22        Grafts Left Foot Amp Site  Puraply #1 Graft #1 4x4 fenestrated 06/15/22   Puraply #2 Graft #2 4x4 fenestrated 06/22/22                                    Orders for this week: 7/6/23      Left Plantar Foot - Wash with soap and water, pat dry. Apply generous amount of A&D to lower foot and leg. Right amp site - Wash with soap and water, pat dry. Apply Anasept Gel and Shwetha to wound bed. Cover with ca alginate and 4x4 silicone border. Reinforce with tape. Change daily. Wound vac discontinued 2/14/23, may return to [facility]           Follow Up Instructions:  At the [address] [place] [address] in 1 week

## 2023-07-06 NOTE — PROGRESS NOTES
NAME:  Scott Silverman  YOB: 1980  MEDICAL RECORD NUMBER:  [id number]  DATE:  7/6/2023    Anasept Gel, Shwetha, ca alginate, 4x4 silicone border.   Reinforce with tape (Holly completed dressing change)       Electronically signed by Casey Brannon LPN on 7/5/8121 at 7:15 AM
diabetes mellitus, with fat layer exposed ([address]) - Primary    Relevant Orders    Initiate Outpatient Wound Care Protocol    Diabetic ulcer of toe of left foot associated with type 2 diabetes mellitus, with fat layer exposed ([address])    Relevant Orders    Initiate Outpatient Wound Care Protocol       Other    Cellulitis of left foot    Relevant Orders    Initiate Outpatient Wound Care Protocol    WD-Partial nontraumatic amputation of foot, left [facility])    Relevant Orders    Initiate Outpatient Wound Care Protocol     Procedure Note    Indications:  Based on my examination of this patient's wound(s) today, sharp excision into necrotic subcutaneous tissue is required to promote healing and evaluate the extent of previous healing. Performed by: RAFAEL Ma CNP    Consent obtained: Yes    Time out taken:  Yes    Pain Control: Anesthetic  Anesthetic: 2% LidocaineSpray       Debridement:Excisional Debridement    Using curette the wound(s) was/were sharply debrided down through and including the removal of subcutaneous tissue. Devitalized Tissue Debrided:  fibrin, slough, and exudate    Pre Debridement Measurements:  Are located in the Wound Documentation Flow Sheet    All active wounds listed below with today's date are evaluated  Wound(s)    debrided this date include # : 3     Post  Debridement Measurements:  Wound 10/20/22 #3 right amp site.  (Active)   Wound Image   05/30/23 0753   Wound Etiology Non-Healing Surgical 07/06/23 0857   Dressing Status New dressing applied 07/06/23 0857   Wound Cleansed Soap and water 07/06/23 0850   Dressing/Treatment Other (comment) 04/04/23 1020   Offloading for Diabetic Foot Ulcers Other (comment) 07/06/23 0857   Wound Length (cm) 0 cm 07/06/23 0850   Wound Width (cm) 0 cm 07/06/23 0850   Wound Depth (cm) 0 cm 07/06/23 0850   Wound Surface Area (cm^2) 0 cm^2 07/06/23 0850   Change in Wound Size % (l*w) 100 07/06/23 0850   Wound Volume (cm^3) 0 cm^3 07/06/23 0850   Wound

## 2023-07-12 ENCOUNTER — CARE COORDINATION (OUTPATIENT)
Dept: CARE COORDINATION | Age: 43
End: 2023-07-12

## 2023-07-12 NOTE — CARE COORDINATION
KOLE called pt to follow up with AL and [employer]. Inquired if pt has heard from Jacque at [facility]. Pt stated he has not heard from her and doesn't  believe he has missed any phone calls. Pt did receive a call back from [facility] wheelchair place. \" Pt stated he believes he will get a new WC, as they came out and took pt measurements. Inquired about pt Medicaid and pt said, \"I don't have it anymore. \"  Advised pt to call [address] as many people are needing to renew. Provided pt with the # and pt stated he will call. Active listening provided. Plan of care: Follow up with [facility]/Johnny  Provide resources to pt.

## 2023-07-13 ENCOUNTER — HOSPITAL ENCOUNTER (OUTPATIENT)
Dept: WOUND CARE | Age: 43
Discharge: HOME | End: 2023-07-13
Payer: MEDICARE

## 2023-07-13 ENCOUNTER — OFFICE VISIT (OUTPATIENT)
Dept: FAMILY MEDICINE CLINIC | Age: 43
End: 2023-07-13
Payer: MEDICARE

## 2023-07-13 VITALS
HEART RATE: 103 BPM | DIASTOLIC BLOOD PRESSURE: 73 MMHG | RESPIRATION RATE: 20 BRPM | TEMPERATURE: 97.9 F | SYSTOLIC BLOOD PRESSURE: 120 MMHG

## 2023-07-13 VITALS — HEART RATE: 94 BPM | DIASTOLIC BLOOD PRESSURE: 74 MMHG | OXYGEN SATURATION: 99 % | SYSTOLIC BLOOD PRESSURE: 122 MMHG

## 2023-07-13 DIAGNOSIS — Z11.59 ENCOUNTER FOR SCREENING FOR OTHER VIRAL DISEASES: ICD-10-CM

## 2023-07-13 DIAGNOSIS — E11.42 TYPE 2 DIABETES MELLITUS WITH DIABETIC POLYNEUROPATHY, WITH LONG-TERM CURRENT USE OF INSULIN (HCC): Primary | ICD-10-CM

## 2023-07-13 DIAGNOSIS — Z78.9 VARICELLA VACCINATION STATUS UNKNOWN: ICD-10-CM

## 2023-07-13 DIAGNOSIS — E11.42 TYPE 2 DIABETES MELLITUS WITH DIABETIC POLYNEUROPATHY, WITH LONG-TERM CURRENT USE OF INSULIN (HCC): ICD-10-CM

## 2023-07-13 DIAGNOSIS — T87.81 DEHISCENCE OF AMPUTATION STUMP OF RIGHT LOWER EXTREMITY (HCC): ICD-10-CM

## 2023-07-13 DIAGNOSIS — Z79.4 TYPE 2 DIABETES MELLITUS WITH DIABETIC POLYNEUROPATHY, WITH LONG-TERM CURRENT USE OF INSULIN (HCC): Primary | ICD-10-CM

## 2023-07-13 DIAGNOSIS — Z11.4 ENCOUNTER FOR SCREENING FOR HIV: ICD-10-CM

## 2023-07-13 DIAGNOSIS — Z11.59 ENCOUNTER FOR HEPATITIS C SCREENING TEST FOR LOW RISK PATIENT: ICD-10-CM

## 2023-07-13 DIAGNOSIS — Z79.4 TYPE 2 DIABETES MELLITUS WITH DIABETIC POLYNEUROPATHY, WITH LONG-TERM CURRENT USE OF INSULIN (HCC): ICD-10-CM

## 2023-07-13 DIAGNOSIS — Z89.511 S/P BKA (BELOW KNEE AMPUTATION), RIGHT (HCC): Primary | ICD-10-CM

## 2023-07-13 DIAGNOSIS — R68.89 VIVID DREAM: ICD-10-CM

## 2023-07-13 DIAGNOSIS — S88.119A BELOW KNEE AMPUTATION (HCC): ICD-10-CM

## 2023-07-13 PROCEDURE — 3046F HEMOGLOBIN A1C LEVEL >9.0%: CPT

## 2023-07-13 PROCEDURE — 3074F SYST BP LT 130 MM HG: CPT

## 2023-07-13 PROCEDURE — G8420 CALC BMI NORM PARAMETERS: HCPCS

## 2023-07-13 PROCEDURE — 11719 TRIM NAIL(S) ANY NUMBER: CPT

## 2023-07-13 PROCEDURE — 2022F DILAT RTA XM EVC RTNOPTHY: CPT

## 2023-07-13 PROCEDURE — 3078F DIAST BP <80 MM HG: CPT

## 2023-07-13 PROCEDURE — 97597 DBRDMT OPN WND 1ST 20 CM/<: CPT

## 2023-07-13 PROCEDURE — G8427 DOCREV CUR MEDS BY ELIG CLIN: HCPCS

## 2023-07-13 PROCEDURE — 1036F TOBACCO NON-USER: CPT

## 2023-07-13 PROCEDURE — 99213 OFFICE O/P EST LOW 20 MIN: CPT

## 2023-07-13 RX ORDER — LIDOCAINE 40 MG/G
CREAM TOPICAL ONCE
OUTPATIENT
Start: 2023-07-13 | End: 2023-07-13

## 2023-07-13 RX ORDER — CLOBETASOL PROPIONATE 0.5 MG/G
OINTMENT TOPICAL ONCE
OUTPATIENT
Start: 2023-07-13 | End: 2023-07-13

## 2023-07-13 RX ORDER — LIDOCAINE HYDROCHLORIDE 20 MG/ML
JELLY TOPICAL ONCE
OUTPATIENT
Start: 2023-07-13 | End: 2023-07-13

## 2023-07-13 RX ORDER — GINSENG 100 MG
CAPSULE ORAL ONCE
OUTPATIENT
Start: 2023-07-13 | End: 2023-07-13

## 2023-07-13 RX ORDER — GENTAMICIN SULFATE 1 MG/G
OINTMENT TOPICAL ONCE
OUTPATIENT
Start: 2023-07-13 | End: 2023-07-13

## 2023-07-13 RX ORDER — BETAMETHASONE DIPROPIONATE 0.05 %
OINTMENT (GRAM) TOPICAL ONCE
OUTPATIENT
Start: 2023-07-13 | End: 2023-07-13

## 2023-07-13 RX ORDER — LIDOCAINE 50 MG/G
OINTMENT TOPICAL ONCE
OUTPATIENT
Start: 2023-07-13 | End: 2023-07-13

## 2023-07-13 RX ORDER — IBUPROFEN 200 MG
TABLET ORAL ONCE
OUTPATIENT
Start: 2023-07-13 | End: 2023-07-13

## 2023-07-13 RX ORDER — LIDOCAINE HYDROCHLORIDE 40 MG/ML
SOLUTION TOPICAL ONCE
OUTPATIENT
Start: 2023-07-13 | End: 2023-07-13

## 2023-07-13 RX ORDER — BACITRACIN ZINC AND POLYMYXIN B SULFATE 500; 1000 [USP'U]/G; [USP'U]/G
OINTMENT TOPICAL ONCE
OUTPATIENT
Start: 2023-07-13 | End: 2023-07-13

## 2023-07-13 ASSESSMENT — ENCOUNTER SYMPTOMS
NAUSEA: 0
COLOR CHANGE: 0
CONSTIPATION: 0
DIARRHEA: 0
VOMITING: 0
BLOOD IN STOOL: 0
ABDOMINAL PAIN: 0
SHORTNESS OF BREATH: 0

## 2023-07-13 NOTE — PROGRESS NOTES
7/13/2023    Prakash Cyr    Chief Complaint   Patient presents with    1 Month Follow-Up     - no complaints         HPI  History was obtained from patient. Sean Ellison is a pleasant 43 y.o. male who presents today for 1 month follow up. DM2: AM FBS have been running 150-160s per patient. No episodes of hypoglycemia reported. He has been basing his Lantus dose on blood sugars taken immediately before he takes his Lantus. Sleep: sleeping well with seroquel and Prazosin- denies vivid dreams. 1. Type 2 diabetes mellitus with diabetic polyneuropathy, with long-term current use of insulin ([address])    2. Vivid dream    3. Below knee amputation ([address])    4. Varicella vaccination status unknown    5. Encounter for screening for other viral diseases    6. Encounter for hepatitis C screening test for low risk patient    7. Encounter for screening for HIV             REVIEW OF SYMPTOMS    Review of Systems   Constitutional:  Negative for chills, fever and unexpected weight change. Respiratory:  Negative for shortness of breath. Cardiovascular: Negative. Negative for chest pain, palpitations and leg swelling. Gastrointestinal:  Negative for abdominal pain, blood in stool, constipation, diarrhea, nausea and vomiting. Genitourinary:  Negative for difficulty urinating. Musculoskeletal:  Positive for gait problem (Right BKA without current prosthesis). Skin:  Negative for color change. Neurological:  Negative for light-headedness. Psychiatric/Behavioral:  Negative for hallucinations, self-injury, sleep disturbance and suicidal ideas.       PAST MEDICAL HISTORY  Past Medical History:   Diagnosis Date    Abscess of left foot 4/22/2022    Anemia associated with acute blood loss 4/26/2022    Callus of foot     Right foot - see's Dr. Rock Marrufo    Cellulitis of left foot 4/22/2022    Diabetic ulcer of left midfoot associated with type 2 diabetes mellitus, with muscle involvement without evidence of necrosis ([address])

## 2023-07-13 NOTE — DISCHARGE INSTRUCTIONS
PHYSICIAN ORDERS AND DISCHARGE INSTRUCTIONS     NOTE: Upon discharge from the [address] [place] [address], you will receive a patient experience survey. We would be grateful if you would take the time to fill this survey out. Wound cleansing:              Do not scrub or use excessive force. Wash hands with soap and water before and after dressing changes. Prior to applying a clean dressing, cleanse wound with normal saline, wound cleanser, or mild soap and water. Ask the physician or nurse before getting the wound(s) wet in a shower     Daily Wound management:              Keep weight off wounds and reposition every 2 hours. Avoid standing for long periods of time. Apply wraps/stockings in AM and remove at bedtime. If swelling is present, elevate legs to the level of the heart or above for 30 minutes 4-5 times a day and/or when sitting. When taking antibiotics take entire prescription as ordered by physician do not stop taking until medicine is all gone. Wound Care Notes:  Rx: Page Settle  Apply for grafts 05/11/22: aLL GRAFTS approved 06/08/22 for Left Toe amp site  Reapply for graft 08/22/22 for Left toes amp site   YADY's   Right 1.07      Left    1.1           Date: 08/15/22        Grafts Left Foot Amp Site  Puraply #1 Graft #1 4x4 fenestrated 06/15/22   Puraply #2 Graft #2 4x4 fenestrated 06/22/22                                    Orders for this week: 7/13/23      Left Plantar Foot - Wash with soap and water, pat dry. Apply generous amount of A&D to lower foot and leg. Right amp site - Wash with soap and water, pat dry. Apply  Shwetha to wound bed. Cover with d 4x4 silicone border. Reinforce with tape. Change daily. Wound vac discontinued 2/14/23, may return to [facility]           Follow Up Instructions:  At the [address] [place] [address] in 1 week   Primary Wound Care

## 2023-07-13 NOTE — PROGRESS NOTES
Left    1.1           Date: 08/15/22        Grafts Left Foot Amp Site  Puraply #1 Graft #1 4x4 fenestrated 06/15/22   Puraply #2 Graft #2 4x4 fenestrated 22                                    Orders for this week: 23      Left Plantar Foot - Wash with soap and water, pat dry. Apply generous amount of A&D to lower foot and leg. Right amp site - Wash with soap and water, pat dry. Apply  Shwetha to wound bed. Cover with d 4x4 silicone border. Reinforce with tape. Change daily. Wound vac discontinued 23, may return to [facility]           Follow Up Instructions: At the [address] [place] [address] in 1 week   Primary Wound Care Provider: Kelly Grove CNP   Call  for any questions or concerns. Central [phone]        Treatment Note [REMOVED] Wound 10/20/22 #3 right amp site. -Dressing/Treatment:  (Shwetha Steri Strip  Silicone BOrder)    Written Patient Dismissal Instructions Given            Electronically signed by RAFAEL De Leon CNP on 2023 at 11:27 AM

## 2023-07-17 ENCOUNTER — CARE COORDINATION (OUTPATIENT)
Dept: CARE COORDINATION | Age: 43
End: 2023-07-17

## 2023-07-17 NOTE — CARE COORDINATION
KOLE called [facility] at [phone] and spoke with Gigi Najjar informed him that I have made several attempts to contact Haseeb Martinez CM for pt. He verified that I had the correct phone # and said he would email her and request a call to me.

## 2023-07-17 NOTE — CARE COORDINATION
Tori muhammad pt CM from [employer] called me to inform me that she is waiting to hear back from  [address] [facility] [address] to see if pt is approved. She stated they will call pt first to verify. Informed her I will be in contact with pt on 7/21 to follow up.

## 2023-07-20 ENCOUNTER — HOSPITAL ENCOUNTER (OUTPATIENT)
Dept: WOUND CARE | Age: 43
Discharge: HOME | End: 2023-07-20
Payer: MEDICARE

## 2023-07-20 VITALS
HEART RATE: 98 BPM | DIASTOLIC BLOOD PRESSURE: 71 MMHG | SYSTOLIC BLOOD PRESSURE: 125 MMHG | RESPIRATION RATE: 20 BRPM | TEMPERATURE: 97.1 F

## 2023-07-20 DIAGNOSIS — Z89.432 PARTIAL NONTRAUMATIC AMPUTATION OF FOOT, LEFT (HCC): ICD-10-CM

## 2023-07-20 DIAGNOSIS — E11.621 DIABETIC ULCER OF LEFT MIDFOOT ASSOCIATED WITH TYPE 2 DIABETES MELLITUS, WITH FAT LAYER EXPOSED (HCC): Primary | ICD-10-CM

## 2023-07-20 DIAGNOSIS — L03.116 CELLULITIS OF LEFT FOOT: ICD-10-CM

## 2023-07-20 DIAGNOSIS — L97.422 DIABETIC ULCER OF LEFT MIDFOOT ASSOCIATED WITH TYPE 2 DIABETES MELLITUS, WITH FAT LAYER EXPOSED (HCC): Primary | ICD-10-CM

## 2023-07-20 DIAGNOSIS — E11.621 DIABETIC ULCER OF TOE OF LEFT FOOT ASSOCIATED WITH TYPE 2 DIABETES MELLITUS, WITH FAT LAYER EXPOSED (HCC): ICD-10-CM

## 2023-07-20 DIAGNOSIS — L97.522 DIABETIC ULCER OF TOE OF LEFT FOOT ASSOCIATED WITH TYPE 2 DIABETES MELLITUS, WITH FAT LAYER EXPOSED (HCC): ICD-10-CM

## 2023-07-20 PROCEDURE — 11042 DBRDMT SUBQ TIS 1ST 20SQCM/<: CPT

## 2023-07-20 RX ORDER — IBUPROFEN 200 MG
TABLET ORAL ONCE
OUTPATIENT
Start: 2023-07-20 | End: 2023-07-20

## 2023-07-20 RX ORDER — CLOBETASOL PROPIONATE 0.5 MG/G
OINTMENT TOPICAL ONCE
OUTPATIENT
Start: 2023-07-20 | End: 2023-07-20

## 2023-07-20 RX ORDER — LIDOCAINE HYDROCHLORIDE 40 MG/ML
SOLUTION TOPICAL ONCE
OUTPATIENT
Start: 2023-07-20 | End: 2023-07-20

## 2023-07-20 RX ORDER — LIDOCAINE HYDROCHLORIDE 20 MG/ML
JELLY TOPICAL ONCE
OUTPATIENT
Start: 2023-07-20 | End: 2023-07-20

## 2023-07-20 RX ORDER — GENTAMICIN SULFATE 1 MG/G
OINTMENT TOPICAL ONCE
OUTPATIENT
Start: 2023-07-20 | End: 2023-07-20

## 2023-07-20 RX ORDER — BETAMETHASONE DIPROPIONATE 0.05 %
OINTMENT (GRAM) TOPICAL ONCE
OUTPATIENT
Start: 2023-07-20 | End: 2023-07-20

## 2023-07-20 RX ORDER — LIDOCAINE 50 MG/G
OINTMENT TOPICAL ONCE
OUTPATIENT
Start: 2023-07-20 | End: 2023-07-20

## 2023-07-20 RX ORDER — LIDOCAINE 40 MG/G
CREAM TOPICAL ONCE
OUTPATIENT
Start: 2023-07-20 | End: 2023-07-20

## 2023-07-20 RX ORDER — BACITRACIN ZINC AND POLYMYXIN B SULFATE 500; 1000 [USP'U]/G; [USP'U]/G
OINTMENT TOPICAL ONCE
OUTPATIENT
Start: 2023-07-20 | End: 2023-07-20

## 2023-07-20 RX ORDER — GINSENG 100 MG
CAPSULE ORAL ONCE
OUTPATIENT
Start: 2023-07-20 | End: 2023-07-20

## 2023-07-20 ASSESSMENT — PAIN SCALES - GENERAL: PAINLEVEL_OUTOF10: 0

## 2023-07-20 NOTE — DISCHARGE INSTRUCTIONS
At the [address] [place] [address] in 1 week   Primary Wound Care Provider: Raji Bonilla CNP   Call  for any questions or concerns.   Central Scheduling: [phone]

## 2023-07-27 ENCOUNTER — HOSPITAL ENCOUNTER (OUTPATIENT)
Dept: WOUND CARE | Age: 43
Discharge: HOME | End: 2023-07-27
Payer: MEDICARE

## 2023-07-27 VITALS
SYSTOLIC BLOOD PRESSURE: 117 MMHG | DIASTOLIC BLOOD PRESSURE: 79 MMHG | RESPIRATION RATE: 20 BRPM | HEART RATE: 89 BPM | TEMPERATURE: 97.3 F

## 2023-07-27 DIAGNOSIS — Z89.432 PARTIAL NONTRAUMATIC AMPUTATION OF FOOT, LEFT (HCC): ICD-10-CM

## 2023-07-27 DIAGNOSIS — E11.621 DIABETIC ULCER OF LEFT MIDFOOT ASSOCIATED WITH TYPE 2 DIABETES MELLITUS, WITH FAT LAYER EXPOSED (HCC): Primary | ICD-10-CM

## 2023-07-27 DIAGNOSIS — E11.621 DIABETIC ULCER OF TOE OF LEFT FOOT ASSOCIATED WITH TYPE 2 DIABETES MELLITUS, WITH FAT LAYER EXPOSED (HCC): ICD-10-CM

## 2023-07-27 DIAGNOSIS — L97.422 DIABETIC ULCER OF LEFT MIDFOOT ASSOCIATED WITH TYPE 2 DIABETES MELLITUS, WITH FAT LAYER EXPOSED (HCC): Primary | ICD-10-CM

## 2023-07-27 DIAGNOSIS — L03.116 CELLULITIS OF LEFT FOOT: ICD-10-CM

## 2023-07-27 DIAGNOSIS — L97.522 DIABETIC ULCER OF TOE OF LEFT FOOT ASSOCIATED WITH TYPE 2 DIABETES MELLITUS, WITH FAT LAYER EXPOSED (HCC): ICD-10-CM

## 2023-07-27 PROCEDURE — 11042 DBRDMT SUBQ TIS 1ST 20SQCM/<: CPT | Performed by: NURSE PRACTITIONER

## 2023-07-27 PROCEDURE — 11042 DBRDMT SUBQ TIS 1ST 20SQCM/<: CPT

## 2023-07-27 RX ORDER — LIDOCAINE HYDROCHLORIDE 20 MG/ML
JELLY TOPICAL ONCE
OUTPATIENT
Start: 2023-07-27 | End: 2023-07-27

## 2023-07-27 RX ORDER — LIDOCAINE 50 MG/G
OINTMENT TOPICAL ONCE
OUTPATIENT
Start: 2023-07-27 | End: 2023-07-27

## 2023-07-27 RX ORDER — BACITRACIN ZINC AND POLYMYXIN B SULFATE 500; 1000 [USP'U]/G; [USP'U]/G
OINTMENT TOPICAL ONCE
OUTPATIENT
Start: 2023-07-27 | End: 2023-07-27

## 2023-07-27 RX ORDER — LIDOCAINE 40 MG/G
CREAM TOPICAL ONCE
OUTPATIENT
Start: 2023-07-27 | End: 2023-07-27

## 2023-07-27 RX ORDER — GINSENG 100 MG
CAPSULE ORAL ONCE
OUTPATIENT
Start: 2023-07-27 | End: 2023-07-27

## 2023-07-27 RX ORDER — IBUPROFEN 200 MG
TABLET ORAL ONCE
OUTPATIENT
Start: 2023-07-27 | End: 2023-07-27

## 2023-07-27 RX ORDER — CLOBETASOL PROPIONATE 0.5 MG/G
OINTMENT TOPICAL ONCE
OUTPATIENT
Start: 2023-07-27 | End: 2023-07-27

## 2023-07-27 RX ORDER — BETAMETHASONE DIPROPIONATE 0.05 %
OINTMENT (GRAM) TOPICAL ONCE
OUTPATIENT
Start: 2023-07-27 | End: 2023-07-27

## 2023-07-27 RX ORDER — GENTAMICIN SULFATE 1 MG/G
OINTMENT TOPICAL ONCE
OUTPATIENT
Start: 2023-07-27 | End: 2023-07-27

## 2023-07-27 RX ORDER — LIDOCAINE HYDROCHLORIDE 40 MG/ML
SOLUTION TOPICAL ONCE
OUTPATIENT
Start: 2023-07-27 | End: 2023-07-27

## 2023-07-27 NOTE — DISCHARGE INSTRUCTIONS
Primary Wound Care Provider: Jazmyne Diamond CNP   Call  for any questions or concerns.   Central [phone]

## 2023-07-27 NOTE — PROGRESS NOTES
right amp site-Dressing/Treatment:  (Shwetha, Steri strips, silicone border)    Written Patient Dismissal Instructions Given            Electronically signed by RAFAEL Eckert CNP on 7/27/2023 at 11:02 AM

## 2023-08-03 ENCOUNTER — HOSPITAL ENCOUNTER (OUTPATIENT)
Dept: WOUND CARE | Age: 43
Discharge: HOME | End: 2023-08-03
Payer: MEDICARE

## 2023-08-03 VITALS — TEMPERATURE: 97.9 F | SYSTOLIC BLOOD PRESSURE: 119 MMHG | DIASTOLIC BLOOD PRESSURE: 84 MMHG | HEART RATE: 93 BPM

## 2023-08-03 DIAGNOSIS — L97.422 DIABETIC ULCER OF LEFT MIDFOOT ASSOCIATED WITH TYPE 2 DIABETES MELLITUS, WITH FAT LAYER EXPOSED (HCC): Primary | ICD-10-CM

## 2023-08-03 DIAGNOSIS — L03.116 CELLULITIS OF LEFT FOOT: ICD-10-CM

## 2023-08-03 DIAGNOSIS — T87.89 NON-PRESSURE ULCER OF STUMP OF BELOW KNEE AMPUTATION OF RIGHT LOWER EXTREMITY WITH FAT LAYER EXPOSED (HCC): ICD-10-CM

## 2023-08-03 DIAGNOSIS — L97.912 NON-PRESSURE ULCER OF STUMP OF BELOW KNEE AMPUTATION OF RIGHT LOWER EXTREMITY WITH FAT LAYER EXPOSED (HCC): ICD-10-CM

## 2023-08-03 DIAGNOSIS — L97.522 DIABETIC ULCER OF TOE OF LEFT FOOT ASSOCIATED WITH TYPE 2 DIABETES MELLITUS, WITH FAT LAYER EXPOSED (HCC): ICD-10-CM

## 2023-08-03 DIAGNOSIS — Z89.511 S/P BKA (BELOW KNEE AMPUTATION), RIGHT (HCC): ICD-10-CM

## 2023-08-03 DIAGNOSIS — Z89.432 PARTIAL NONTRAUMATIC AMPUTATION OF FOOT, LEFT (HCC): ICD-10-CM

## 2023-08-03 DIAGNOSIS — T87.81 DEHISCENCE OF AMPUTATION STUMP OF RIGHT LOWER EXTREMITY (HCC): ICD-10-CM

## 2023-08-03 DIAGNOSIS — E11.621 DIABETIC ULCER OF TOE OF LEFT FOOT ASSOCIATED WITH TYPE 2 DIABETES MELLITUS, WITH FAT LAYER EXPOSED (HCC): ICD-10-CM

## 2023-08-03 DIAGNOSIS — E11.621 DIABETIC ULCER OF LEFT MIDFOOT ASSOCIATED WITH TYPE 2 DIABETES MELLITUS, WITH FAT LAYER EXPOSED (HCC): Primary | ICD-10-CM

## 2023-08-03 PROBLEM — M86.9 OSTEOMYELITIS (HCC): Status: RESOLVED | Noted: 2021-06-12 | Resolved: 2023-08-03

## 2023-08-03 PROBLEM — M86.10 ACUTE OSTEOMYELITIS (HCC): Status: RESOLVED | Noted: 2022-04-18 | Resolved: 2023-08-03

## 2023-08-03 PROCEDURE — 11042 DBRDMT SUBQ TIS 1ST 20SQCM/<: CPT

## 2023-08-03 RX ORDER — GENTAMICIN SULFATE 1 MG/G
OINTMENT TOPICAL ONCE
OUTPATIENT
Start: 2023-08-03 | End: 2023-08-03

## 2023-08-03 RX ORDER — BETAMETHASONE DIPROPIONATE 0.05 %
OINTMENT (GRAM) TOPICAL ONCE
OUTPATIENT
Start: 2023-08-03 | End: 2023-08-03

## 2023-08-03 RX ORDER — IBUPROFEN 200 MG
TABLET ORAL ONCE
OUTPATIENT
Start: 2023-08-03 | End: 2023-08-03

## 2023-08-03 RX ORDER — LIDOCAINE 50 MG/G
OINTMENT TOPICAL ONCE
OUTPATIENT
Start: 2023-08-03 | End: 2023-08-03

## 2023-08-03 RX ORDER — LIDOCAINE HYDROCHLORIDE 20 MG/ML
JELLY TOPICAL ONCE
OUTPATIENT
Start: 2023-08-03 | End: 2023-08-03

## 2023-08-03 RX ORDER — GINSENG 100 MG
CAPSULE ORAL ONCE
OUTPATIENT
Start: 2023-08-03 | End: 2023-08-03

## 2023-08-03 RX ORDER — LIDOCAINE HYDROCHLORIDE 40 MG/ML
SOLUTION TOPICAL ONCE
OUTPATIENT
Start: 2023-08-03 | End: 2023-08-03

## 2023-08-03 RX ORDER — BACITRACIN ZINC AND POLYMYXIN B SULFATE 500; 1000 [USP'U]/G; [USP'U]/G
OINTMENT TOPICAL ONCE
OUTPATIENT
Start: 2023-08-03 | End: 2023-08-03

## 2023-08-03 RX ORDER — LIDOCAINE 40 MG/G
CREAM TOPICAL ONCE
OUTPATIENT
Start: 2023-08-03 | End: 2023-08-03

## 2023-08-03 RX ORDER — CLOBETASOL PROPIONATE 0.5 MG/G
OINTMENT TOPICAL ONCE
OUTPATIENT
Start: 2023-08-03 | End: 2023-08-03

## 2023-08-03 NOTE — DISCHARGE INSTRUCTIONS
PHYSICIAN ORDERS AND DISCHARGE INSTRUCTIONS     NOTE: Upon discharge from the  Medical [facility], you will receive a patient experience survey. We would be grateful if you would take the time to fill this survey out. Wound cleansing:              Do not scrub or use excessive force. Wash hands with soap and water before and after dressing changes. Prior to applying a clean dressing, cleanse wound with normal saline, wound cleanser, or mild soap and water. Ask the physician or nurse before getting the wound(s) wet in a shower     Daily Wound management:              Keep weight off wounds and reposition every 2 hours. Avoid standing for long periods of time. Apply wraps/stockings in AM and remove at bedtime. If swelling is present, elevate legs to the level of the heart or above for 30 minutes 4-5 times a day and/or when sitting. When taking antibiotics take entire prescription as ordered by physician do not stop taking until medicine is all gone. Wound Care Notes:  Rx: Maisha Palacios  Apply for grafts 05/11/22: aLL GRAFTS approved 06/08/22 for Left Toe amp site  Reapply for graft 08/22/22 for Left toes amp site   YADY's   Right 1.07      Left    1.1           Date: 08/15/22        Grafts Left Foot Amp Site  Puraply #1 Graft #1 4x4 fenestrated 06/15/22   Puraply #2 Graft #2 4x4 fenestrated 06/22/22                                    Orders for this week: 8/3/23      Left Plantar Foot - Wash with soap and water, pat dry. A&D to foot and Leg     Right leg amp site - Wash with soap and water, pat dry. Apply Anasept and Shwetha to wound bed. Steri strip in place. Cover with 4x4 silicone border. Reinforce with tape. Change every other day. Wound vac discontinued 2/14/23, may return to [facility]           Follow Up Instructions:  At the [address] [place] [address] in 1 week

## 2023-08-03 NOTE — PROGRESS NOTES
Raji Bonilla CNP   Call  for any questions or concerns. Central [phone]        Treatment Note Wound 23 Tibial Right; Anterior #1 right amp site-Dressing/Treatment:  (Anasept Shwetha Steri strips silicone border)    Written Patient Dismissal Instructions Given            Electronically signed by RAFAEL Fu CNP on 8/3/2023 at 12:36 PM

## 2023-08-07 ENCOUNTER — CARE COORDINATION (OUTPATIENT)
Dept: CARE COORDINATION | Age: 43
End: 2023-08-07

## 2023-08-07 NOTE — CARE COORDINATION
SW called pt to follow up with AL. Pt stated he did receive a call from \"someone on Friday telling me that they will set up a day to seee the facility and do an eval.\" He stated they asked him if he had a wound vac at this time and pt voiced no and \"my wound looks good. \" Pt currently waiting for that call. Pt concerned about transportation and SW advised him to see if they can transport or to call Trudie Koyanagi to see if [employer] can. Pt voiced understanding. Informed pt that I will follow up with him on 8/14. Pt voiced understanding.      Plan of care:  Assist with AL placement  Provide support in community

## 2023-08-10 ENCOUNTER — HOSPITAL ENCOUNTER (OUTPATIENT)
Dept: WOUND CARE | Age: 43
Discharge: HOME | End: 2023-08-10
Payer: MEDICARE

## 2023-08-10 VITALS
RESPIRATION RATE: 18 BRPM | SYSTOLIC BLOOD PRESSURE: 122 MMHG | DIASTOLIC BLOOD PRESSURE: 87 MMHG | TEMPERATURE: 98 F | HEART RATE: 87 BPM

## 2023-08-10 DIAGNOSIS — Z89.432 PARTIAL NONTRAUMATIC AMPUTATION OF FOOT, LEFT (HCC): ICD-10-CM

## 2023-08-10 DIAGNOSIS — L97.522 DIABETIC ULCER OF TOE OF LEFT FOOT ASSOCIATED WITH TYPE 2 DIABETES MELLITUS, WITH FAT LAYER EXPOSED (HCC): ICD-10-CM

## 2023-08-10 DIAGNOSIS — E11.621 DIABETIC ULCER OF TOE OF LEFT FOOT ASSOCIATED WITH TYPE 2 DIABETES MELLITUS, WITH FAT LAYER EXPOSED (HCC): ICD-10-CM

## 2023-08-10 DIAGNOSIS — L97.422 DIABETIC ULCER OF LEFT MIDFOOT ASSOCIATED WITH TYPE 2 DIABETES MELLITUS, WITH FAT LAYER EXPOSED (HCC): Primary | ICD-10-CM

## 2023-08-10 DIAGNOSIS — L03.116 CELLULITIS OF LEFT FOOT: ICD-10-CM

## 2023-08-10 DIAGNOSIS — E11.621 DIABETIC ULCER OF LEFT MIDFOOT ASSOCIATED WITH TYPE 2 DIABETES MELLITUS, WITH FAT LAYER EXPOSED (HCC): Primary | ICD-10-CM

## 2023-08-10 PROCEDURE — 11042 DBRDMT SUBQ TIS 1ST 20SQCM/<: CPT

## 2023-08-10 RX ORDER — LIDOCAINE 50 MG/G
OINTMENT TOPICAL ONCE
OUTPATIENT
Start: 2023-08-10 | End: 2023-08-10

## 2023-08-10 RX ORDER — BETAMETHASONE DIPROPIONATE 0.05 %
OINTMENT (GRAM) TOPICAL ONCE
OUTPATIENT
Start: 2023-08-10 | End: 2023-08-10

## 2023-08-10 RX ORDER — GINSENG 100 MG
CAPSULE ORAL ONCE
OUTPATIENT
Start: 2023-08-10 | End: 2023-08-10

## 2023-08-10 RX ORDER — LIDOCAINE HYDROCHLORIDE 40 MG/ML
SOLUTION TOPICAL ONCE
OUTPATIENT
Start: 2023-08-10 | End: 2023-08-10

## 2023-08-10 RX ORDER — LIDOCAINE HYDROCHLORIDE 20 MG/ML
JELLY TOPICAL ONCE
Status: CANCELLED | OUTPATIENT
Start: 2023-08-10 | End: 2023-08-10

## 2023-08-10 RX ORDER — CLOBETASOL PROPIONATE 0.5 MG/G
OINTMENT TOPICAL ONCE
OUTPATIENT
Start: 2023-08-10 | End: 2023-08-10

## 2023-08-10 RX ORDER — LIDOCAINE 40 MG/G
CREAM TOPICAL ONCE
OUTPATIENT
Start: 2023-08-10 | End: 2023-08-10

## 2023-08-10 RX ORDER — GENTAMICIN SULFATE 1 MG/G
OINTMENT TOPICAL ONCE
OUTPATIENT
Start: 2023-08-10 | End: 2023-08-10

## 2023-08-10 RX ORDER — BACITRACIN ZINC AND POLYMYXIN B SULFATE 500; 1000 [USP'U]/G; [USP'U]/G
OINTMENT TOPICAL ONCE
OUTPATIENT
Start: 2023-08-10 | End: 2023-08-10

## 2023-08-10 RX ORDER — IBUPROFEN 200 MG
TABLET ORAL ONCE
OUTPATIENT
Start: 2023-08-10 | End: 2023-08-10

## 2023-08-10 ASSESSMENT — PAIN SCALES - GENERAL: PAINLEVEL_OUTOF10: 0

## 2023-08-10 NOTE — PROGRESS NOTES
[phone]        Treatment Note Wound 07/20/23 Tibial Right; Anterior #1 right amp site-Dressing/Treatment:  (spray anasept ariadna steristrip silicone border tape by provider)    Written Patient Dismissal Instructions Given            Electronically signed by RAFAEL Paredes CNP on 8/10/2023 at 10:37 AM

## 2023-08-10 NOTE — DISCHARGE INSTRUCTIONS
PHYSICIAN ORDERS AND DISCHARGE INSTRUCTIONS     NOTE: Upon discharge from the  Medical [facility], you will receive a patient experience survey. We would be grateful if you would take the time to fill this survey out. Wound cleansing:              Do not scrub or use excessive force. Wash hands with soap and water before and after dressing changes. Prior to applying a clean dressing, cleanse wound with normal saline, wound cleanser, or mild soap and water. Ask the physician or nurse before getting the wound(s) wet in a shower     Daily Wound management:              Keep weight off wounds and reposition every 2 hours. Avoid standing for long periods of time. Apply wraps/stockings in AM and remove at bedtime. If swelling is present, elevate legs to the level of the heart or above for 30 minutes 4-5 times a day and/or when sitting. When taking antibiotics take entire prescription as ordered by physician do not stop taking until medicine is all gone. Wound Care Notes:  Rx: Maisha Palacios  Apply for grafts 05/11/22: aLL GRAFTS approved 06/08/22 for Left Toe amp site  Reapply for graft 08/22/22 for Left toes amp site   YADY's   Right 1.07      Left    1.1           Date: 08/15/22        Grafts Left Foot Amp Site  Puraply #1 Graft #1 4x4 fenestrated 06/15/22   Puraply #2 Graft #2 4x4 fenestrated 06/22/22                                    Orders for this week: 8/10/23      Left Plantar Foot - Wash with soap and water, pat dry. A&D to foot and Leg     Right leg amp site - Wash with soap and water, pat dry. Apply Spray Anasept and Shwetha to wound bed. Steri strip in place. Cover with 4x4 silicone border. Reinforce with tape. Change every other day. Wound vac discontinued 2/14/23, may return to [facility]           Follow Up Instructions:  At the [address] [place] [address] in 1

## 2023-08-14 ENCOUNTER — CARE COORDINATION (OUTPATIENT)
Dept: CARE COORDINATION | Age: 43
End: 2023-08-14

## 2023-08-14 NOTE — CARE COORDINATION
SW called pt to follow up with Hever Ontiveros that was to come to his hoe today from [facility]. Pt stated Author Hannah came and had to update my Medicaid waiver. \" T stated I should know this week if I am approved for it and if so the AL facility will call me. Advised pt to contact me with any SW needs. Pt voiced understanding. Will follow up next week.

## 2023-08-16 ENCOUNTER — TELEPHONE (OUTPATIENT)
Dept: FAMILY MEDICINE CLINIC | Age: 43
End: 2023-08-16

## 2023-08-16 NOTE — TELEPHONE ENCOUNTER
To Mary-    Wants to make sure it is okay patient goes into an Assisted Living Program----Samantha is trying to run the expenses through [place]. Needs a verbal approval and current diagnosis. Is trying to go to [facility].

## 2023-08-17 ENCOUNTER — HOSPITAL ENCOUNTER (OUTPATIENT)
Dept: WOUND CARE | Age: 43
Discharge: HOME | End: 2023-08-17
Payer: MEDICARE

## 2023-08-17 VITALS
SYSTOLIC BLOOD PRESSURE: 125 MMHG | DIASTOLIC BLOOD PRESSURE: 80 MMHG | TEMPERATURE: 98 F | HEART RATE: 77 BPM | RESPIRATION RATE: 16 BRPM

## 2023-08-17 DIAGNOSIS — L03.116 CELLULITIS OF LEFT FOOT: ICD-10-CM

## 2023-08-17 DIAGNOSIS — L97.522 DIABETIC ULCER OF TOE OF LEFT FOOT ASSOCIATED WITH TYPE 2 DIABETES MELLITUS, WITH FAT LAYER EXPOSED (HCC): ICD-10-CM

## 2023-08-17 DIAGNOSIS — Z89.432 PARTIAL NONTRAUMATIC AMPUTATION OF FOOT, LEFT (HCC): ICD-10-CM

## 2023-08-17 DIAGNOSIS — E11.621 DIABETIC ULCER OF TOE OF LEFT FOOT ASSOCIATED WITH TYPE 2 DIABETES MELLITUS, WITH FAT LAYER EXPOSED (HCC): ICD-10-CM

## 2023-08-17 DIAGNOSIS — L97.422 DIABETIC ULCER OF LEFT MIDFOOT ASSOCIATED WITH TYPE 2 DIABETES MELLITUS, WITH FAT LAYER EXPOSED (HCC): Primary | ICD-10-CM

## 2023-08-17 DIAGNOSIS — E11.621 DIABETIC ULCER OF LEFT MIDFOOT ASSOCIATED WITH TYPE 2 DIABETES MELLITUS, WITH FAT LAYER EXPOSED (HCC): Primary | ICD-10-CM

## 2023-08-17 PROCEDURE — 97597 DBRDMT OPN WND 1ST 20 CM/<: CPT

## 2023-08-17 RX ORDER — GINSENG 100 MG
CAPSULE ORAL ONCE
OUTPATIENT
Start: 2023-08-17 | End: 2023-08-17

## 2023-08-17 RX ORDER — BETAMETHASONE DIPROPIONATE 0.05 %
OINTMENT (GRAM) TOPICAL ONCE
OUTPATIENT
Start: 2023-08-17 | End: 2023-08-17

## 2023-08-17 RX ORDER — LIDOCAINE HYDROCHLORIDE 40 MG/ML
SOLUTION TOPICAL ONCE
OUTPATIENT
Start: 2023-08-17 | End: 2023-08-17

## 2023-08-17 RX ORDER — CLOBETASOL PROPIONATE 0.5 MG/G
OINTMENT TOPICAL ONCE
OUTPATIENT
Start: 2023-08-17 | End: 2023-08-17

## 2023-08-17 RX ORDER — IBUPROFEN 200 MG
TABLET ORAL ONCE
OUTPATIENT
Start: 2023-08-17 | End: 2023-08-17

## 2023-08-17 RX ORDER — LIDOCAINE 50 MG/G
OINTMENT TOPICAL ONCE
OUTPATIENT
Start: 2023-08-17 | End: 2023-08-17

## 2023-08-17 RX ORDER — GENTAMICIN SULFATE 1 MG/G
OINTMENT TOPICAL ONCE
OUTPATIENT
Start: 2023-08-17 | End: 2023-08-17

## 2023-08-17 RX ORDER — LIDOCAINE 40 MG/G
CREAM TOPICAL ONCE
OUTPATIENT
Start: 2023-08-17 | End: 2023-08-17

## 2023-08-17 RX ORDER — BACITRACIN ZINC AND POLYMYXIN B SULFATE 500; 1000 [USP'U]/G; [USP'U]/G
OINTMENT TOPICAL ONCE
OUTPATIENT
Start: 2023-08-17 | End: 2023-08-17

## 2023-08-17 ASSESSMENT — PAIN DESCRIPTION - FREQUENCY: FREQUENCY: INTERMITTENT

## 2023-08-17 ASSESSMENT — PAIN DESCRIPTION - PAIN TYPE: TYPE: CHRONIC PAIN

## 2023-08-17 ASSESSMENT — PAIN DESCRIPTION - ONSET: ONSET: ON-GOING

## 2023-08-17 ASSESSMENT — PAIN - FUNCTIONAL ASSESSMENT: PAIN_FUNCTIONAL_ASSESSMENT: ACTIVITIES ARE NOT PREVENTED

## 2023-08-17 ASSESSMENT — PAIN SCALES - GENERAL: PAINLEVEL_OUTOF10: 0

## 2023-08-17 NOTE — DISCHARGE INSTRUCTIONS
PHYSICIAN ORDERS AND DISCHARGE INSTRUCTIONS     NOTE: Upon discharge from the [address] [place] [address], you will receive a patient experience survey. We would be grateful if you would take the time to fill this survey out. Wound cleansing:              Do not scrub or use excessive force. Wash hands with soap and water before and after dressing changes. Prior to applying a clean dressing, cleanse wound with normal saline, wound cleanser, or mild soap and water. Ask the physician or nurse before getting the wound(s) wet in a shower     Daily Wound management:              Keep weight off wounds and reposition every 2 hours. Avoid standing for long periods of time. Apply wraps/stockings in AM and remove at bedtime. If swelling is present, elevate legs to the level of the heart or above for 30 minutes 4-5 times a day and/or when sitting. When taking antibiotics take entire prescription as ordered by physician do not stop taking until medicine is all gone. Wound Care Notes:  Rx: Stacy Rudd  Apply for grafts 05/11/22: aLL GRAFTS approved 06/08/22 for Left Toe amp site  Reapply for graft 08/22/22 for Left toes amp site   YADY's   Right 1.07      Left    1.1           Date: 08/15/22        Grafts Left Foot Amp Site  Puraply #1 Graft #1 4x4 fenestrated 06/15/22   Puraply #2 Graft #2 4x4 fenestrated 06/22/22                                    Orders for this week: 8/17/23      Left Plantar Foot - Wash with soap and water, pat dry. A&D to foot and Leg     Right leg amp site - Wash with soap and water, pat dry. Apply Shwetha to wound bed. Steri strip in place. Cover with Hydrofera blue border . Change every other day      Dispense 30 day quantity when ordering supplies. Follow Up Instructions:  At the [address] [place] [address] in 1 week   Primary Wound Care Provider: Reginald Hunter

## 2023-08-24 ENCOUNTER — HOSPITAL ENCOUNTER (OUTPATIENT)
Dept: WOUND CARE | Age: 43
Discharge: HOME | End: 2023-08-24
Payer: MEDICARE

## 2023-08-24 VITALS
SYSTOLIC BLOOD PRESSURE: 150 MMHG | TEMPERATURE: 97.3 F | HEART RATE: 86 BPM | RESPIRATION RATE: 16 BRPM | DIASTOLIC BLOOD PRESSURE: 84 MMHG

## 2023-08-24 DIAGNOSIS — E11.621 DIABETIC ULCER OF LEFT MIDFOOT ASSOCIATED WITH TYPE 2 DIABETES MELLITUS, WITH FAT LAYER EXPOSED (HCC): Primary | ICD-10-CM

## 2023-08-24 DIAGNOSIS — L97.522 DIABETIC ULCER OF TOE OF LEFT FOOT ASSOCIATED WITH TYPE 2 DIABETES MELLITUS, WITH FAT LAYER EXPOSED (HCC): ICD-10-CM

## 2023-08-24 DIAGNOSIS — L03.116 CELLULITIS OF LEFT FOOT: ICD-10-CM

## 2023-08-24 DIAGNOSIS — Z89.432 PARTIAL NONTRAUMATIC AMPUTATION OF FOOT, LEFT (HCC): ICD-10-CM

## 2023-08-24 DIAGNOSIS — E11.621 DIABETIC ULCER OF TOE OF LEFT FOOT ASSOCIATED WITH TYPE 2 DIABETES MELLITUS, WITH FAT LAYER EXPOSED (HCC): ICD-10-CM

## 2023-08-24 DIAGNOSIS — L97.422 DIABETIC ULCER OF LEFT MIDFOOT ASSOCIATED WITH TYPE 2 DIABETES MELLITUS, WITH FAT LAYER EXPOSED (HCC): Primary | ICD-10-CM

## 2023-08-24 PROCEDURE — 11042 DBRDMT SUBQ TIS 1ST 20SQCM/<: CPT

## 2023-08-24 RX ORDER — CLOBETASOL PROPIONATE 0.5 MG/G
OINTMENT TOPICAL ONCE
OUTPATIENT
Start: 2023-08-24 | End: 2023-08-24

## 2023-08-24 RX ORDER — BETAMETHASONE DIPROPIONATE 0.05 %
OINTMENT (GRAM) TOPICAL ONCE
OUTPATIENT
Start: 2023-08-24 | End: 2023-08-24

## 2023-08-24 RX ORDER — BACITRACIN ZINC AND POLYMYXIN B SULFATE 500; 1000 [USP'U]/G; [USP'U]/G
OINTMENT TOPICAL ONCE
OUTPATIENT
Start: 2023-08-24 | End: 2023-08-24

## 2023-08-24 RX ORDER — LIDOCAINE HYDROCHLORIDE 40 MG/ML
SOLUTION TOPICAL ONCE
OUTPATIENT
Start: 2023-08-24 | End: 2023-08-24

## 2023-08-24 RX ORDER — GENTAMICIN SULFATE 1 MG/G
OINTMENT TOPICAL ONCE
OUTPATIENT
Start: 2023-08-24 | End: 2023-08-24

## 2023-08-24 RX ORDER — GINSENG 100 MG
CAPSULE ORAL ONCE
OUTPATIENT
Start: 2023-08-24 | End: 2023-08-24

## 2023-08-24 RX ORDER — LIDOCAINE 40 MG/G
CREAM TOPICAL ONCE
OUTPATIENT
Start: 2023-08-24 | End: 2023-08-24

## 2023-08-24 RX ORDER — IBUPROFEN 200 MG
TABLET ORAL ONCE
OUTPATIENT
Start: 2023-08-24 | End: 2023-08-24

## 2023-08-24 RX ORDER — LIDOCAINE 50 MG/G
OINTMENT TOPICAL ONCE
OUTPATIENT
Start: 2023-08-24 | End: 2023-08-24

## 2023-08-24 ASSESSMENT — PAIN SCALES - GENERAL: PAINLEVEL_OUTOF10: 0

## 2023-08-24 NOTE — PROGRESS NOTES
Wound Care Center Progress Note With Procedure    Annette Jacobs  AGE: 43 y.o. GENDER: male  : 1980  EPISODE DATE:  2023     Subjective:       CHIEF COMPLAINT  WOUND   Chief Complaint   Patient presents with    Wound Check         HISTORY of PRESENT ILLNESS      Annette Jacobs is a 43 y.o. 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 The ulcer is of marked severity. The underlying cause of the wound is diabetes, non-healing surgical. He presents today with a new wound to the right plantar foot that is diabetic and pressure in nature and of moderate severity. The patient has significant underlying medical conditions as below. The patient is having significant depression related to the recent and sudden death of his mother. RAFAEL Barrett CNP is PCP last seen 6/15/23. Wound Pain Timing/Severity: none  Quality of pain: N/A  Severity of pain:  0 / 10   Modifying Factors: diabetes and chronic pressure  Associated Signs/Symptoms: drainage     Diabetes: Yes, on an oral and insulin regimen  Hemoglobin A1C   Date Value Ref Range Status   2023 11.8 See comment % Final     Comment:     Comment:  Diagnosis of Diabetes: > or = 6.5%  Increased risk of diabetes (Prediabetes): 5.7-6.4%  Glycemic Control: Nonpregnant Adults: <7.0%                    Pregnant: <6.0%          Diabetes education provided today:    Diabetes pathoetiology, difference between type 1 and type 2 diabetes, and progressive nature of Type 2 DM. Diabetic Neuropathy: signs and therapy. Foot care: advised to wash feet daily, pat dry and apply lotion at night, avoiding between toes. Need to look at feet daily and report to a physician any signs of inflammation or skin damage. Discussed diabetes shoes and socks.   Diabetic management related to wound care    Smoking: Never

## 2023-08-24 NOTE — DISCHARGE INSTRUCTIONS
PHYSICIAN ORDERS AND DISCHARGE INSTRUCTIONS     NOTE: Upon discharge from the  Medical [facility], you will receive a patient experience survey. We would be grateful if you would take the time to fill this survey out. Wound cleansing:              Do not scrub or use excessive force. Wash hands with soap and water before and after dressing changes. Prior to applying a clean dressing, cleanse wound with normal saline, wound cleanser, or mild soap and water. Ask the physician or nurse before getting the wound(s) wet in a shower     Daily Wound management:              Keep weight off wounds and reposition every 2 hours. Avoid standing for long periods of time. Apply wraps/stockings in AM and remove at bedtime. If swelling is present, elevate legs to the level of the heart or above for 30 minutes 4-5 times a day and/or when sitting. When taking antibiotics take entire prescription as ordered by physician do not stop taking until medicine is all gone. Wound Care Notes:  Rx: Dash Call  Apply for grafts 05/11/22: aLL GRAFTS approved 06/08/22 for Left Toe amp site  Reapply for graft 08/22/22 for Left toes amp site   YADY's   Right 1.07      Left    1.1           Date: 08/15/22        Grafts Left Foot Amp Site  Puraply #1 Graft #1 4x4 fenestrated 06/15/22   Puraply #2 Graft #2 4x4 fenestrated 06/22/22                                    Orders for this week: 8/24/23      A&D to left foot and leg     Right leg amp site - Wash with soap and water, pat dry. Apply Shwetha to wound bed. Steri strip in place. Cover with Hydrofera blue border in clinic (cover with silicone border at home). Change daily. Dispense 30 day quantity when ordering supplies. Follow Up Instructions:  At the [address] [place] [address] in 1 week   Primary Wound Care Provider: Ulysses Irwin, CNP   Call [phone]

## 2023-08-24 NOTE — PLAN OF CARE
Demographics info for supply order 23. Electronically signed by Jacquetta Rinne, RN on 2023 at 4:36 PM      Patient Info:    Kevin [address]  [phone]  : 1980  Age: 43 y.o. Gender: male  Todays Date: 2023    Insurance Info:    Plan: MEDICARE PART A AND B  Coverage: MEDICARE  Effective Date: 10/1/2022  Group Number: [unfilled]  Subscriber Number: [id number] - (Medicare)    Payer/Plan Subscr  Sex Relation Sub. Ins. ID Effective Group Num   1. MEDICARE - Chiquita Luna 1980 Male Self [id number] 10/1/22                                    [address]    2.  MEDICAID Radha Logansina 1980 Male Self [id number] 1/1/15                                    [address]
Problem: Chronic Conditions and Co-morbidities  Goal: Patient's chronic conditions and co-morbidity symptoms are monitored and maintained or improved  Outcome: Progressing     Problem: Wound:  Goal: Will show signs of wound healing; wound closure and no evidence of infection  Description: Will show signs of wound healing; wound closure and no evidence of infection  Outcome: Progressing
No

## 2023-08-31 ENCOUNTER — HOSPITAL ENCOUNTER (OUTPATIENT)
Dept: WOUND CARE | Age: 43
Discharge: HOME | End: 2023-08-31
Payer: MEDICARE

## 2023-08-31 VITALS
TEMPERATURE: 97.3 F | RESPIRATION RATE: 16 BRPM | DIASTOLIC BLOOD PRESSURE: 63 MMHG | SYSTOLIC BLOOD PRESSURE: 120 MMHG | HEART RATE: 91 BPM

## 2023-08-31 DIAGNOSIS — Z89.432 PARTIAL NONTRAUMATIC AMPUTATION OF FOOT, LEFT (HCC): ICD-10-CM

## 2023-08-31 DIAGNOSIS — L97.422 DIABETIC ULCER OF LEFT MIDFOOT ASSOCIATED WITH TYPE 2 DIABETES MELLITUS, WITH FAT LAYER EXPOSED (HCC): Primary | ICD-10-CM

## 2023-08-31 DIAGNOSIS — L03.116 CELLULITIS OF LEFT FOOT: ICD-10-CM

## 2023-08-31 DIAGNOSIS — L97.522 DIABETIC ULCER OF TOE OF LEFT FOOT ASSOCIATED WITH TYPE 2 DIABETES MELLITUS, WITH FAT LAYER EXPOSED (HCC): ICD-10-CM

## 2023-08-31 DIAGNOSIS — E11.621 DIABETIC ULCER OF TOE OF LEFT FOOT ASSOCIATED WITH TYPE 2 DIABETES MELLITUS, WITH FAT LAYER EXPOSED (HCC): ICD-10-CM

## 2023-08-31 DIAGNOSIS — E11.621 DIABETIC ULCER OF LEFT MIDFOOT ASSOCIATED WITH TYPE 2 DIABETES MELLITUS, WITH FAT LAYER EXPOSED (HCC): Primary | ICD-10-CM

## 2023-08-31 PROCEDURE — 11042 DBRDMT SUBQ TIS 1ST 20SQCM/<: CPT

## 2023-08-31 PROCEDURE — 11042 DBRDMT SUBQ TIS 1ST 20SQCM/<: CPT | Performed by: NURSE PRACTITIONER

## 2023-08-31 RX ORDER — GENTAMICIN SULFATE 1 MG/G
OINTMENT TOPICAL ONCE
OUTPATIENT
Start: 2023-08-31 | End: 2023-08-31

## 2023-08-31 RX ORDER — BETAMETHASONE DIPROPIONATE 0.05 %
OINTMENT (GRAM) TOPICAL ONCE
OUTPATIENT
Start: 2023-08-31 | End: 2023-08-31

## 2023-08-31 RX ORDER — LIDOCAINE HYDROCHLORIDE 40 MG/ML
SOLUTION TOPICAL ONCE
OUTPATIENT
Start: 2023-08-31 | End: 2023-08-31

## 2023-08-31 RX ORDER — BACITRACIN ZINC AND POLYMYXIN B SULFATE 500; 1000 [USP'U]/G; [USP'U]/G
OINTMENT TOPICAL ONCE
OUTPATIENT
Start: 2023-08-31 | End: 2023-08-31

## 2023-08-31 RX ORDER — LIDOCAINE 50 MG/G
OINTMENT TOPICAL ONCE
OUTPATIENT
Start: 2023-08-31 | End: 2023-08-31

## 2023-08-31 RX ORDER — IBUPROFEN 200 MG
TABLET ORAL ONCE
OUTPATIENT
Start: 2023-08-31 | End: 2023-08-31

## 2023-08-31 RX ORDER — GINSENG 100 MG
CAPSULE ORAL ONCE
OUTPATIENT
Start: 2023-08-31 | End: 2023-08-31

## 2023-08-31 RX ORDER — LIDOCAINE 40 MG/G
CREAM TOPICAL ONCE
OUTPATIENT
Start: 2023-08-31 | End: 2023-08-31

## 2023-08-31 RX ORDER — CLOBETASOL PROPIONATE 0.5 MG/G
OINTMENT TOPICAL ONCE
OUTPATIENT
Start: 2023-08-31 | End: 2023-08-31

## 2023-08-31 ASSESSMENT — PAIN SCALES - GENERAL: PAINLEVEL_OUTOF10: 0

## 2023-08-31 NOTE — DISCHARGE INSTRUCTIONS
PHYSICIAN ORDERS AND DISCHARGE INSTRUCTIONS     NOTE: Upon discharge from the  Medical [facility], you will receive a patient experience survey. We would be grateful if you would take the time to fill this survey out. Wound cleansing:              Do not scrub or use excessive force. Wash hands with soap and water before and after dressing changes. Prior to applying a clean dressing, cleanse wound with normal saline, wound cleanser, or mild soap and water. Ask the physician or nurse before getting the wound(s) wet in a shower     Daily Wound management:              Keep weight off wounds and reposition every 2 hours. Avoid standing for long periods of time. Apply wraps/stockings in AM and remove at bedtime. If swelling is present, elevate legs to the level of the heart or above for 30 minutes 4-5 times a day and/or when sitting. When taking antibiotics take entire prescription as ordered by physician do not stop taking until medicine is all gone. Wound Care Notes:  Rx: Diogo Jose  Apply for grafts 05/11/22: aLL GRAFTS approved 06/08/22 for Left Toe amp site  Reapply for graft 08/22/22 for Left toes amp site   YADY's   Right 1.07      Left    1.1           Date: 08/15/22        Grafts Left Foot Amp Site  Puraply #1 Graft #1 4x4 fenestrated 06/15/22   Puraply #2 Graft #2 4x4 fenestrated 06/22/22                                    Orders for this week: 8/31/23      A&D to left foot and leg     Right leg amp site - Wash with soap and water, pat dry. Apply Shwetha to wound bed. Steri strip in place. Cover with Hydrofera blue border in clinic (cover with silicone border at home). Change daily. Dispense 30 day quantity when ordering supplies. Follow Up Instructions:  At the [address] [place] [address] in 1 week   Primary Wound Care Provider: Gerson Christie CNP   Call [phone]

## 2023-08-31 NOTE — PROGRESS NOTES
AMPUTATION Left 4/23/2022    LEFT RAY GREAT TOE AMPUTATION performed by Julia Cruz MD at [facility]    Family History   Problem Relation Age of Onset    Heart Disease Father     Diabetes Father     Diabetes Mother     Heart Disease Mother     Other Mother     Kidney Disease Mother     Heart Attack Mother        SOCIAL HISTORY    Social History     Tobacco Use    Smoking status: Never    Smokeless tobacco: Never   Vaping Use    Vaping Use: Never used   Substance Use Topics    Alcohol use: Yes     Comment: occasionally    Drug use: No       ALLERGIES    No Known Allergies    MEDICATIONS    Current Outpatient Medications on File Prior to Encounter   Medication Sig Dispense Refill    empagliflozin (JARDIANCE) 10 MG tablet Take 1 tablet by mouth daily 30 tablet 1    insulin glargine (BASAGLAR KWIKPEN) 100 UNIT/ML injection pen Inject 30 Units into the skin nightly 5 Adjustable Dose Pre-filled Pen Syringe 3    QUEtiapine (SEROQUEL) 400 MG tablet Take 1 tablet by mouth nightly 90 tablet 1    Dulaglutide 4.5 MG/0.5ML SOPN Inject 4.5 mg into the skin once a week 4 Adjustable Dose Pre-filled Pen Syringe 2    carvedilol (COREG) 12.5 MG tablet Take 1 tablet by mouth 2 times daily (with meals) 180 tablet 0    metFORMIN (GLUCOPHAGE-XR) 500 MG extended release tablet Take 2 tablets by mouth 2 times daily (with meals) 120 tablet 3    amLODIPine (NORVASC) 2.5 MG tablet Take 1 tablet by mouth daily 30 tablet 3    prazosin (MINIPRESS) 1 MG capsule Take 1 capsule by mouth nightly 30 capsule 3    losartan (COZAAR) 100 MG tablet Take 1 tablet by mouth daily 30 tablet 3    clopidogrel (PLAVIX) 75 MG tablet Take 1 tablet by mouth daily 30 tablet 3    atorvastatin (LIPITOR) 40 MG tablet Take 1 tablet by mouth nightly 30 tablet 3    pantoprazole (PROTONIX) 40 MG tablet Take 1 tablet by mouth every morning (before breakfast) 30 tablet 3    ondansetron (ZOFRAN) 4 MG tablet Take 1 tablet

## 2023-09-05 NOTE — DISCHARGE INSTRUCTIONS
PHYSICIAN ORDERS AND DISCHARGE INSTRUCTIONS     NOTE: Upon discharge from the  Medical [facility] ([place], [place]), you will receive a patient experience survey. We would be grateful if you would take the time to fill this survey out. Wound cleansing:              Do not scrub or use excessive force. Wash hands with soap and water before and after dressing changes. Prior to applying a clean dressing, cleanse wound with normal saline, wound cleanser, or mild soap and water. Ask the physician or nurse before getting the wound(s) wet in a shower     Daily Wound management:              Keep weight off wounds and reposition every 2 hours. Avoid standing for long periods of time. Apply wraps/stockings in AM and remove at bedtime. If swelling is present, elevate legs to the level of the heart or above for 30 minutes 4-5 times a day and/or when sitting. When taking antibiotics take entire prescription as ordered by physician do not stop taking until medicine is all gone. Wound Care Notes:  Rx: Yane Escalante  Apply for grafts 05/11/22: aLL GRAFTS approved 06/08/22 for Left Toe amp site  Reapply for graft 08/22/22 for Left toes amp site   YADY's   Right 1.07      Left    1.1           Date: 08/15/22   Lives at [facility] assisted living        Grafts Left Foot Amp Site  Puraply #1 Graft #1 4x4 fenestrated 06/15/22   Puraply #2 Graft #2 4x4 fenestrated 06/22/22                                    Orders for this week: 9/7/23      A&D to left foot and leg  Right leg amp site - Wash with soap and water, pat dry. Apply anasept spray dampened Shwetha to wound bed. Steri strip in place. Cover with Hydrofera blue border ready in clinic (cover with silicone border at home). Change on Monday      Dispense 30 day quantity when ordering supplies. Follow Up Instructions:  At the [address] [place] [address] in 1

## 2023-09-07 ENCOUNTER — HOSPITAL ENCOUNTER (OUTPATIENT)
Dept: WOUND CARE | Age: 43
Discharge: HOME | End: 2023-09-07
Payer: MEDICARE

## 2023-09-07 VITALS
RESPIRATION RATE: 18 BRPM | DIASTOLIC BLOOD PRESSURE: 79 MMHG | TEMPERATURE: 98 F | HEART RATE: 77 BPM | SYSTOLIC BLOOD PRESSURE: 114 MMHG

## 2023-09-07 DIAGNOSIS — Z89.511 S/P BKA (BELOW KNEE AMPUTATION), RIGHT (HCC): ICD-10-CM

## 2023-09-07 DIAGNOSIS — L97.522 DIABETIC ULCER OF TOE OF LEFT FOOT ASSOCIATED WITH TYPE 2 DIABETES MELLITUS, WITH FAT LAYER EXPOSED (HCC): ICD-10-CM

## 2023-09-07 DIAGNOSIS — E11.621 DIABETIC ULCER OF LEFT MIDFOOT ASSOCIATED WITH TYPE 2 DIABETES MELLITUS, WITH FAT LAYER EXPOSED (HCC): Primary | ICD-10-CM

## 2023-09-07 DIAGNOSIS — E11.621 DIABETIC ULCER OF TOE OF LEFT FOOT ASSOCIATED WITH TYPE 2 DIABETES MELLITUS, WITH FAT LAYER EXPOSED (HCC): ICD-10-CM

## 2023-09-07 DIAGNOSIS — Z89.432 PARTIAL NONTRAUMATIC AMPUTATION OF FOOT, LEFT (HCC): ICD-10-CM

## 2023-09-07 DIAGNOSIS — T87.81 DEHISCENCE OF AMPUTATION STUMP OF RIGHT LOWER EXTREMITY (HCC): ICD-10-CM

## 2023-09-07 DIAGNOSIS — L97.422 DIABETIC ULCER OF LEFT MIDFOOT ASSOCIATED WITH TYPE 2 DIABETES MELLITUS, WITH FAT LAYER EXPOSED (HCC): Primary | ICD-10-CM

## 2023-09-07 DIAGNOSIS — L03.116 CELLULITIS OF LEFT FOOT: ICD-10-CM

## 2023-09-07 PROCEDURE — 11042 DBRDMT SUBQ TIS 1ST 20SQCM/<: CPT

## 2023-09-07 RX ORDER — LIDOCAINE 50 MG/G
OINTMENT TOPICAL ONCE
OUTPATIENT
Start: 2023-09-07 | End: 2023-09-07

## 2023-09-07 RX ORDER — CLOBETASOL PROPIONATE 0.5 MG/G
OINTMENT TOPICAL ONCE
OUTPATIENT
Start: 2023-09-07 | End: 2023-09-07

## 2023-09-07 RX ORDER — LIDOCAINE HYDROCHLORIDE 40 MG/ML
SOLUTION TOPICAL ONCE
OUTPATIENT
Start: 2023-09-07 | End: 2023-09-07

## 2023-09-07 RX ORDER — BACITRACIN ZINC AND POLYMYXIN B SULFATE 500; 1000 [USP'U]/G; [USP'U]/G
OINTMENT TOPICAL ONCE
OUTPATIENT
Start: 2023-09-07 | End: 2023-09-07

## 2023-09-07 RX ORDER — GENTAMICIN SULFATE 1 MG/G
OINTMENT TOPICAL ONCE
OUTPATIENT
Start: 2023-09-07 | End: 2023-09-07

## 2023-09-07 RX ORDER — BETAMETHASONE DIPROPIONATE 0.05 %
OINTMENT (GRAM) TOPICAL ONCE
OUTPATIENT
Start: 2023-09-07 | End: 2023-09-07

## 2023-09-07 RX ORDER — IBUPROFEN 200 MG
TABLET ORAL ONCE
OUTPATIENT
Start: 2023-09-07 | End: 2023-09-07

## 2023-09-07 RX ORDER — GINSENG 100 MG
CAPSULE ORAL ONCE
OUTPATIENT
Start: 2023-09-07 | End: 2023-09-07

## 2023-09-07 RX ORDER — LIDOCAINE 40 MG/G
CREAM TOPICAL ONCE
OUTPATIENT
Start: 2023-09-07 | End: 2023-09-07

## 2023-09-07 ASSESSMENT — PAIN SCALES - GENERAL: PAINLEVEL_OUTOF10: 0

## 2023-09-07 ASSESSMENT — PAIN SCALES - WONG BAKER: WONGBAKER_NUMERICALRESPONSE: 0

## 2023-09-08 NOTE — PROGRESS NOTES
Wound Care Center Progress Note With Procedure    Elissa Coreas  AGE: 43 y.o. GENDER: male  : 1980  EPISODE DATE:  2023     Subjective:       CHIEF COMPLAINT  WOUND   Chief Complaint   Patient presents with    Wound Check         HISTORY of PRESENT ILLNESS      Elissa Coreas is a 43 y.o. 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 The ulcer is of marked severity. The underlying cause of the wound is diabetes, non-healing surgical. He presents today with a new wound to the right plantar foot that is diabetic and pressure in nature and of moderate severity. The patient has significant underlying medical conditions as below. The patient is having significant depression related to the recent and sudden death of his mother. RAFAEL Jose CNP is PCP last seen 6/15/23. Wound Pain Timing/Severity: none  Quality of pain: N/A  Severity of pain:  0 / 10   Modifying Factors: diabetes and chronic pressure  Associated Signs/Symptoms: drainage     Diabetes: Yes, on an oral and insulin regimen  Hemoglobin A1C   Date Value Ref Range Status   2023 11.8 See comment % Final     Comment:     Comment:  Diagnosis of Diabetes: > or = 6.5%  Increased risk of diabetes (Prediabetes): 5.7-6.4%  Glycemic Control: Nonpregnant Adults: <7.0%                    Pregnant: <6.0%          Diabetes education provided today:    Diabetes pathoetiology, difference between type 1 and type 2 diabetes, and progressive nature of Type 2 DM. Diabetic Neuropathy: signs and therapy. Foot care: advised to wash feet daily, pat dry and apply lotion at night, avoiding between toes. Need to look at feet daily and report to a physician any signs of inflammation or skin damage. Discussed diabetes shoes and socks.   Diabetic management related to wound care    Smoking: Never

## 2023-09-13 ENCOUNTER — CARE COORDINATION (OUTPATIENT)
Dept: CARE COORDINATION | Age: 43
End: 2023-09-13

## 2023-09-13 NOTE — CARE COORDINATION
Sw received a return call from pt. Pt residing \"for a week now\" at [facility] in [place]. Pt shared it is \"much better than where I was. \" Address changed in pt demographic chart info. Encouraged pt to call with any concerns. Pt voiced understanding. Will resolve at this time. No

## 2023-09-13 NOTE — CARE COORDINATION
KOLE called pt to discuss needs. Pt unavailable at the time of my call and vm was left requesting a call back to [phone].

## 2023-09-14 ENCOUNTER — HOSPITAL ENCOUNTER (OUTPATIENT)
Dept: WOUND CARE | Age: 43
Discharge: HOME | End: 2023-09-14
Payer: MEDICARE

## 2023-09-14 VITALS
HEART RATE: 96 BPM | TEMPERATURE: 97.7 F | SYSTOLIC BLOOD PRESSURE: 123 MMHG | DIASTOLIC BLOOD PRESSURE: 84 MMHG | RESPIRATION RATE: 16 BRPM

## 2023-09-14 DIAGNOSIS — T87.89 NON-PRESSURE ULCER OF STUMP OF BELOW KNEE AMPUTATION OF RIGHT LOWER EXTREMITY WITH FAT LAYER EXPOSED (HCC): ICD-10-CM

## 2023-09-14 DIAGNOSIS — L97.912 NON-PRESSURE ULCER OF STUMP OF BELOW KNEE AMPUTATION OF RIGHT LOWER EXTREMITY WITH FAT LAYER EXPOSED (HCC): ICD-10-CM

## 2023-09-14 DIAGNOSIS — Z89.511 S/P BKA (BELOW KNEE AMPUTATION), RIGHT (HCC): Primary | ICD-10-CM

## 2023-09-14 DIAGNOSIS — T87.81 DEHISCENCE OF AMPUTATION STUMP OF RIGHT LOWER EXTREMITY (HCC): ICD-10-CM

## 2023-09-14 PROCEDURE — 11042 DBRDMT SUBQ TIS 1ST 20SQCM/<: CPT

## 2023-09-14 RX ORDER — GENTAMICIN SULFATE 1 MG/G
OINTMENT TOPICAL ONCE
OUTPATIENT
Start: 2023-09-14 | End: 2023-09-14

## 2023-09-14 RX ORDER — LIDOCAINE 40 MG/G
CREAM TOPICAL ONCE
OUTPATIENT
Start: 2023-09-14 | End: 2023-09-14

## 2023-09-14 RX ORDER — BETAMETHASONE DIPROPIONATE 0.05 %
OINTMENT (GRAM) TOPICAL ONCE
OUTPATIENT
Start: 2023-09-14 | End: 2023-09-14

## 2023-09-14 RX ORDER — GINSENG 100 MG
CAPSULE ORAL ONCE
OUTPATIENT
Start: 2023-09-14 | End: 2023-09-14

## 2023-09-14 RX ORDER — LIDOCAINE HYDROCHLORIDE 40 MG/ML
SOLUTION TOPICAL ONCE
OUTPATIENT
Start: 2023-09-14 | End: 2023-09-14

## 2023-09-14 RX ORDER — CLOBETASOL PROPIONATE 0.5 MG/G
OINTMENT TOPICAL ONCE
OUTPATIENT
Start: 2023-09-14 | End: 2023-09-14

## 2023-09-14 RX ORDER — IBUPROFEN 200 MG
TABLET ORAL ONCE
OUTPATIENT
Start: 2023-09-14 | End: 2023-09-14

## 2023-09-14 RX ORDER — LIDOCAINE 50 MG/G
OINTMENT TOPICAL ONCE
OUTPATIENT
Start: 2023-09-14 | End: 2023-09-14

## 2023-09-14 RX ORDER — BACITRACIN ZINC AND POLYMYXIN B SULFATE 500; 1000 [USP'U]/G; [USP'U]/G
OINTMENT TOPICAL ONCE
OUTPATIENT
Start: 2023-09-14 | End: 2023-09-14

## 2023-09-14 ASSESSMENT — PAIN SCALES - GENERAL: PAINLEVEL_OUTOF10: 0

## 2023-09-14 NOTE — DISCHARGE INSTRUCTIONS
week   Primary Wound Care Provider: Sarthak Ennis CNP   Call  for any questions or concerns.   Central [phone]

## 2023-09-14 NOTE — PROGRESS NOTES
Wound Care Center Progress Note With Procedure    Owen Emerson  AGE: 43 y.o. GENDER: male  : 1980  EPISODE DATE:  2023     Subjective:       CHIEF COMPLAINT  WOUND   Chief Complaint   Patient presents with    Wound Check         HISTORY of PRESENT ILLNESS      Owen Emerson is a 43 y.o. 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 The ulcer is of marked severity. The underlying cause of the wound is diabetes, non-healing surgical. He presents today with a new wound to the right plantar foot that is diabetic and pressure in nature and of moderate severity. The patient has significant underlying medical conditions as below. The patient is having significant depression related to the recent and sudden death of his mother. RAFAEL Clancy CNP is PCP last seen 6/15/23. Wound Pain Timing/Severity: none  Quality of pain: N/A  Severity of pain:  0 / 10   Modifying Factors: diabetes and chronic pressure  Associated Signs/Symptoms: drainage     Diabetes: Yes, on an oral and insulin regimen  Hemoglobin A1C   Date Value Ref Range Status   2023 11.8 See comment % Final     Comment:     Comment:  Diagnosis of Diabetes: > or = 6.5%  Increased risk of diabetes (Prediabetes): 5.7-6.4%  Glycemic Control: Nonpregnant Adults: <7.0%                    Pregnant: <6.0%          Diabetes education provided today:    Diabetes pathoetiology, difference between type 1 and type 2 diabetes, and progressive nature of Type 2 DM. Diabetic Neuropathy: signs and therapy. Foot care: advised to wash feet daily, pat dry and apply lotion at night, avoiding between toes. Need to look at feet daily and report to a physician any signs of inflammation or skin damage. Discussed diabetes shoes and socks.   Diabetic management related to wound care    Smoking: Never

## 2023-09-20 ENCOUNTER — TELEPHONE (OUTPATIENT)
Dept: FAMILY MEDICINE CLINIC | Age: 43
End: 2023-09-20

## 2023-09-21 NOTE — PATIENT INSTRUCTIONS
1 week   Primary Wound Care Provider: Feliz Staples CNP   Call  for any questions or concerns.   Central [phone]

## 2023-09-28 ENCOUNTER — HOSPITAL ENCOUNTER (OUTPATIENT)
Dept: WOUND CARE | Age: 43
Discharge: HOME | End: 2023-09-28
Payer: MEDICARE

## 2023-09-28 VITALS
TEMPERATURE: 96.7 F | DIASTOLIC BLOOD PRESSURE: 79 MMHG | RESPIRATION RATE: 16 BRPM | HEART RATE: 92 BPM | SYSTOLIC BLOOD PRESSURE: 125 MMHG

## 2023-09-28 DIAGNOSIS — Z89.511 S/P BKA (BELOW KNEE AMPUTATION), RIGHT (HCC): Primary | ICD-10-CM

## 2023-09-28 DIAGNOSIS — L89.893 PRESSURE ULCER OF BKA STUMP, STAGE 3 (HCC): ICD-10-CM

## 2023-09-28 DIAGNOSIS — T87.89 PRESSURE ULCER OF BKA STUMP, STAGE 3 (HCC): ICD-10-CM

## 2023-09-28 PROBLEM — T87.81 DEHISCENCE OF AMPUTATION STUMP OF RIGHT LOWER EXTREMITY (HCC): Status: RESOLVED | Noted: 2022-10-20 | Resolved: 2023-09-28

## 2023-09-28 PROCEDURE — 11042 DBRDMT SUBQ TIS 1ST 20SQCM/<: CPT

## 2023-09-28 PROCEDURE — 11042 DBRDMT SUBQ TIS 1ST 20SQCM/<: CPT | Performed by: NURSE PRACTITIONER

## 2023-09-28 RX ORDER — BACITRACIN ZINC AND POLYMYXIN B SULFATE 500; 1000 [USP'U]/G; [USP'U]/G
OINTMENT TOPICAL ONCE
OUTPATIENT
Start: 2023-09-28 | End: 2023-09-28

## 2023-09-28 RX ORDER — IBUPROFEN 200 MG
TABLET ORAL ONCE
OUTPATIENT
Start: 2023-09-28 | End: 2023-09-28

## 2023-09-28 RX ORDER — CLOBETASOL PROPIONATE 0.5 MG/G
OINTMENT TOPICAL ONCE
OUTPATIENT
Start: 2023-09-28 | End: 2023-09-28

## 2023-09-28 RX ORDER — TRIAMCINOLONE ACETONIDE 1 MG/G
OINTMENT TOPICAL ONCE
OUTPATIENT
Start: 2023-09-28 | End: 2023-09-28

## 2023-09-28 RX ORDER — LIDOCAINE 40 MG/G
CREAM TOPICAL ONCE
OUTPATIENT
Start: 2023-09-28 | End: 2023-09-28

## 2023-09-28 RX ORDER — LIDOCAINE HYDROCHLORIDE 40 MG/ML
SOLUTION TOPICAL ONCE
OUTPATIENT
Start: 2023-09-28 | End: 2023-09-28

## 2023-09-28 RX ORDER — LIDOCAINE 50 MG/G
OINTMENT TOPICAL ONCE
OUTPATIENT
Start: 2023-09-28 | End: 2023-09-28

## 2023-09-28 RX ORDER — GENTAMICIN SULFATE 1 MG/G
OINTMENT TOPICAL ONCE
OUTPATIENT
Start: 2023-09-28 | End: 2023-09-28

## 2023-09-28 RX ORDER — BETAMETHASONE DIPROPIONATE 0.05 %
OINTMENT (GRAM) TOPICAL ONCE
OUTPATIENT
Start: 2023-09-28 | End: 2023-09-28

## 2023-09-28 RX ORDER — GINSENG 100 MG
CAPSULE ORAL ONCE
OUTPATIENT
Start: 2023-09-28 | End: 2023-09-28

## 2023-09-28 ASSESSMENT — PAIN SCALES - GENERAL: PAINLEVEL_OUTOF10: 0

## 2023-09-28 NOTE — PROGRESS NOTES
Wound Care Center Progress Note With Procedure    Delisa Richard  AGE: 43 y.o. GENDER: male  : 1980  EPISODE DATE:  2023     Subjective:       CHIEF COMPLAINT  WOUND   Chief Complaint   Patient presents with    Wound Check         HISTORY of PRESENT ILLNESS      Delisa Richard is a 43 y.o. 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 The ulcer is of marked severity. The underlying cause of the wound is diabetes, non-healing surgical. He presents today with a new wound to the right plantar foot that is diabetic and pressure in nature and of moderate severity. The patient has significant underlying medical conditions as below. The patient is having significant depression related to the recent and sudden death of his mother. RAFAEL Ramos CNP is PCP last seen 6/15/23. Wound Pain Timing/Severity: none  Quality of pain: N/A  Severity of pain:  0 / 10   Modifying Factors: diabetes and chronic pressure  Associated Signs/Symptoms: drainage     Diabetes: Yes, on an oral and insulin regimen  Hemoglobin A1C   Date Value Ref Range Status   2023 11.8 See comment % Final     Comment:     Comment:  Diagnosis of Diabetes: > or = 6.5%  Increased risk of diabetes (Prediabetes): 5.7-6.4%  Glycemic Control: Nonpregnant Adults: <7.0%                    Pregnant: <6.0%          Diabetes education provided today:    Diabetes pathoetiology, difference between type 1 and type 2 diabetes, and progressive nature of Type 2 DM. Diabetic Neuropathy: signs and therapy. Foot care: advised to wash feet daily, pat dry and apply lotion at night, avoiding between toes. Need to look at feet daily and report to a physician any signs of inflammation or skin damage. Discussed diabetes shoes and socks.   Diabetic management related to wound care    Smoking: Never

## 2023-09-28 NOTE — PATIENT INSTRUCTIONS
and Friday. Dispense 30 day quantity when ordering supplies. Follow Up Instructions: At the [address] [place] [address] in 1 week   Primary Wound Care Provider: Jazmyne Diamond CNP   Call  for any questions or concerns.   Central Scheduling: [phone]

## 2023-10-05 ENCOUNTER — HOSPITAL ENCOUNTER (OUTPATIENT)
Dept: WOUND CARE | Age: 43
Discharge: HOME | End: 2023-10-05

## 2023-10-12 ENCOUNTER — HOSPITAL ENCOUNTER (OUTPATIENT)
Dept: WOUND CARE | Age: 43
Discharge: HOME | End: 2023-10-12
Payer: MEDICARE

## 2023-10-12 VITALS
SYSTOLIC BLOOD PRESSURE: 106 MMHG | DIASTOLIC BLOOD PRESSURE: 77 MMHG | RESPIRATION RATE: 17 BRPM | HEART RATE: 102 BPM | TEMPERATURE: 98.6 F

## 2023-10-12 DIAGNOSIS — E11.621 DIABETIC ULCER OF TOE OF LEFT FOOT ASSOCIATED WITH TYPE 2 DIABETES MELLITUS, WITH FAT LAYER EXPOSED (HCC): ICD-10-CM

## 2023-10-12 DIAGNOSIS — L97.422 DIABETIC ULCER OF LEFT MIDFOOT ASSOCIATED WITH TYPE 2 DIABETES MELLITUS, WITH FAT LAYER EXPOSED (HCC): Primary | ICD-10-CM

## 2023-10-12 DIAGNOSIS — Z89.432 PARTIAL NONTRAUMATIC AMPUTATION OF FOOT, LEFT (HCC): ICD-10-CM

## 2023-10-12 DIAGNOSIS — L97.522 DIABETIC ULCER OF TOE OF LEFT FOOT ASSOCIATED WITH TYPE 2 DIABETES MELLITUS, WITH FAT LAYER EXPOSED (HCC): ICD-10-CM

## 2023-10-12 DIAGNOSIS — E11.621 DIABETIC ULCER OF LEFT MIDFOOT ASSOCIATED WITH TYPE 2 DIABETES MELLITUS, WITH FAT LAYER EXPOSED (HCC): Primary | ICD-10-CM

## 2023-10-12 DIAGNOSIS — L03.116 CELLULITIS OF LEFT FOOT: ICD-10-CM

## 2023-10-12 PROCEDURE — 11042 DBRDMT SUBQ TIS 1ST 20SQCM/<: CPT | Performed by: NURSE PRACTITIONER

## 2023-10-12 PROCEDURE — 11042 DBRDMT SUBQ TIS 1ST 20SQCM/<: CPT

## 2023-10-12 RX ORDER — GENTAMICIN SULFATE 1 MG/G
OINTMENT TOPICAL ONCE
OUTPATIENT
Start: 2023-10-12 | End: 2023-10-12

## 2023-10-12 RX ORDER — LIDOCAINE HYDROCHLORIDE 40 MG/ML
SOLUTION TOPICAL ONCE
OUTPATIENT
Start: 2023-10-12 | End: 2023-10-12

## 2023-10-12 RX ORDER — TRIAMCINOLONE ACETONIDE 1 MG/G
OINTMENT TOPICAL ONCE
OUTPATIENT
Start: 2023-10-12 | End: 2023-10-12

## 2023-10-12 RX ORDER — IBUPROFEN 200 MG
TABLET ORAL ONCE
OUTPATIENT
Start: 2023-10-12 | End: 2023-10-12

## 2023-10-12 RX ORDER — GINSENG 100 MG
CAPSULE ORAL ONCE
OUTPATIENT
Start: 2023-10-12 | End: 2023-10-12

## 2023-10-12 RX ORDER — LIDOCAINE 50 MG/G
OINTMENT TOPICAL ONCE
OUTPATIENT
Start: 2023-10-12 | End: 2023-10-12

## 2023-10-12 RX ORDER — LIDOCAINE 40 MG/G
CREAM TOPICAL ONCE
OUTPATIENT
Start: 2023-10-12 | End: 2023-10-12

## 2023-10-12 RX ORDER — BETAMETHASONE DIPROPIONATE 0.05 %
OINTMENT (GRAM) TOPICAL ONCE
OUTPATIENT
Start: 2023-10-12 | End: 2023-10-12

## 2023-10-12 RX ORDER — CLOBETASOL PROPIONATE 0.5 MG/G
OINTMENT TOPICAL ONCE
OUTPATIENT
Start: 2023-10-12 | End: 2023-10-12

## 2023-10-12 RX ORDER — BACITRACIN ZINC AND POLYMYXIN B SULFATE 500; 1000 [USP'U]/G; [USP'U]/G
OINTMENT TOPICAL ONCE
OUTPATIENT
Start: 2023-10-12 | End: 2023-10-12

## 2023-10-12 NOTE — PROGRESS NOTES
reposition every 2 hours. Avoid standing for long periods of time. Apply wraps/stockings in AM and remove at bedtime. If swelling is present, elevate legs to the level of the heart or above for 30 minutes 4-5 times a day and/or when sitting. When taking antibiotics take entire prescription as ordered by physician do not stop taking until medicine is all gone. Wound Care Notes:  Rx: Jonathan Rivera  Apply for grafts 22: aLL GRAFTS approved 22 for Left Toe amp site  Reapply for graft 22 for Left toes amp site   YADY's   Right 1.07      Left    1.1           Date: 08/15/22   [facility]         Grafts Left Foot Amp Site  Puraply #1 Graft #1 4x4 fenestrated 06/15/22   Puraply #2 Graft #2 4x4 fenestrated 22                                    Orders for this week: 10/12/2023      A&D to left foot and leg  Right leg amp site - Wash with soap and water, pat dry. Apply anasept spray dampened Puracol to wound bed. Steri strip in place. Cover with Hydrofera blue border ready in clinic (cover with silicone border at home). Change on Monday      Dispense 30 day quantity when ordering supplies. Follow Up Instructions: At the [address] [place] [address] in 1 week   Primary Wound Care Provider: Jacqueline Guadarrama CNP   Call  for any questions or concerns.   Central [phone]       Electronically signed by RAFAEL Farley CNP on 10/12/2023 at 12:42 PM

## 2023-10-12 NOTE — PATIENT INSTRUCTIONS
PHYSICIAN ORDERS AND DISCHARGE INSTRUCTIONS     NOTE: Upon discharge from the  Medical [facility], you will receive a patient experience survey. We would be grateful if you would take the time to fill this survey out. Wound cleansing:              Do not scrub or use excessive force. Wash hands with soap and water before and after dressing changes. Prior to applying a clean dressing, cleanse wound with normal saline, wound cleanser, or mild soap and water. Ask the physician or nurse before getting the wound(s) wet in a shower     Daily Wound management:              Keep weight off wounds and reposition every 2 hours. Avoid standing for long periods of time. Apply wraps/stockings in AM and remove at bedtime. If swelling is present, elevate legs to the level of the heart or above for 30 minutes 4-5 times a day and/or when sitting. When taking antibiotics take entire prescription as ordered by physician do not stop taking until medicine is all gone. Wound Care Notes:  Rx: Jenn Hale  Apply for grafts 05/11/22: aLL GRAFTS approved 06/08/22 for Left Toe amp site  Reapply for graft 08/22/22 for Left toes amp site   YADY's   Right 1.07      Left    1.1           Date: 08/15/22   Lives at [facility] assisted living         Grafts Left Foot Amp Site  Puraply #1 Graft #1 4x4 fenestrated 06/15/22   Puraply #2 Graft #2 4x4 fenestrated 06/22/22                                    Orders for this week: 10/12/2023      A&D to left foot and leg  Right leg amp site - Wash with soap and water, pat dry. Apply anasept spray dampened Puracol to wound bed. Steri strip in place. Cover with Hydrofera blue border ready in clinic (cover with silicone border at home). Change on Monday      Dispense 30 day quantity when ordering supplies. Follow Up Instructions:  At the [address] [place] [address]

## 2023-10-26 ENCOUNTER — HOSPITAL ENCOUNTER (OUTPATIENT)
Dept: WOUND CARE | Age: 43
Discharge: HOME | End: 2023-10-26
Payer: MEDICARE

## 2023-10-26 VITALS
RESPIRATION RATE: 18 BRPM | SYSTOLIC BLOOD PRESSURE: 117 MMHG | TEMPERATURE: 98.5 F | DIASTOLIC BLOOD PRESSURE: 78 MMHG | HEART RATE: 102 BPM

## 2023-10-26 DIAGNOSIS — T87.89 PRESSURE ULCER OF BKA STUMP, STAGE 3 (HCC): Primary | ICD-10-CM

## 2023-10-26 DIAGNOSIS — L89.893 PRESSURE ULCER OF BKA STUMP, STAGE 3 (HCC): Primary | ICD-10-CM

## 2023-10-26 PROCEDURE — 11042 DBRDMT SUBQ TIS 1ST 20SQCM/<: CPT

## 2023-10-26 NOTE — PROGRESS NOTES
Wound Care Center Progress Note With Procedure    Ty Roberto  AGE: 37 y.o. GENDER: male  : 1980  EPISODE DATE:  10/26/2023     Subjective:       CHIEF COMPLAINT  WOUND   Chief Complaint   Patient presents with    Wound Check         HISTORY of PRESENT ILLNESS      Ty Roberto is a 37 y.o. 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 The ulcer is of marked severity. The underlying cause of the wound is diabetes, non-healing surgical. He presents today with a new wound to the right plantar foot that is diabetic and pressure in nature and of moderate severity. The patient has significant underlying medical conditions as below. The patient is having significant depression related to the recent and sudden death of his mother. RAFAEL Case CNP is PCP last seen 6/15/23. Wound Pain Timing/Severity: none  Quality of pain: N/A  Severity of pain:  0 / 10   Modifying Factors: diabetes and chronic pressure  Associated Signs/Symptoms: drainage     Diabetes: Yes, on an oral and insulin regimen  Hemoglobin A1C   Date Value Ref Range Status   2023 11.8 See comment % Final     Comment:     Comment:  Diagnosis of Diabetes: > or = 6.5%  Increased risk of diabetes (Prediabetes): 5.7-6.4%  Glycemic Control: Nonpregnant Adults: <7.0%                    Pregnant: <6.0%          Diabetes education provided today:    Diabetes pathoetiology, difference between type 1 and type 2 diabetes, and progressive nature of Type 2 DM. Diabetic Neuropathy: signs and therapy. Foot care: advised to wash feet daily, pat dry and apply lotion at night, avoiding between toes. Need to look at feet daily and report to a physician any signs of inflammation or skin damage. Discussed diabetes shoes and socks.   Diabetic management related to wound care    Smoking: Never

## 2023-10-26 NOTE — PATIENT INSTRUCTIONS
PHYSICIAN ORDERS AND DISCHARGE INSTRUCTIONS     NOTE: Upon discharge from the  Medical [facility], you will receive a patient experience survey. We would be grateful if you would take the time to fill this survey out. Wound cleansing:              Do not scrub or use excessive force. Wash hands with soap and water before and after dressing changes. Prior to applying a clean dressing, cleanse wound with normal saline, wound cleanser, or mild soap and water. Ask the physician or nurse before getting the wound(s) wet in a shower     Daily Wound management:              Keep weight off wounds and reposition every 2 hours. Avoid standing for long periods of time. Apply wraps/stockings in AM and remove at bedtime. If swelling is present, elevate legs to the level of the heart or above for 30 minutes 4-5 times a day and/or when sitting. When taking antibiotics take entire prescription as ordered by physician do not stop taking until medicine is all gone. Wound Care Notes:  Rx: Carl Ro  Apply for grafts 05/11/22: aLL GRAFTS approved 06/08/22 for Left Toe amp site  Reapply for graft 08/22/22 for Left toes amp site   YADY's   Right 1.07      Left    1.1           Date: 08/15/22   [facility]         Grafts Left Foot Amp Site  Puraply #1 Graft #1 4x4 fenestrated 06/15/22   Puraply #2 Graft #2 4x4 fenestrated 06/22/22                                    Orders for this week: 10/26/2023      A&D to left foot and leg  Right leg amp site - Wash with soap and water, pat dry. Apply anasept spray dampened Puracol to wound bed. Steri strip in place. Cover with Hydrofera blue border ready in clinic (cover with silicone border at home). Change on Monday      Dispense 30 day quantity when ordering supplies. Follow Up Instructions:  At the [address] [place] [address]

## 2023-11-02 ENCOUNTER — HOSPITAL ENCOUNTER (OUTPATIENT)
Dept: WOUND CARE | Age: 43
Discharge: HOME | End: 2023-11-02
Payer: MEDICARE

## 2023-11-02 VITALS — TEMPERATURE: 98 F | SYSTOLIC BLOOD PRESSURE: 124 MMHG | DIASTOLIC BLOOD PRESSURE: 82 MMHG | HEART RATE: 100 BPM

## 2023-11-02 DIAGNOSIS — Z89.432 PARTIAL NONTRAUMATIC AMPUTATION OF FOOT, LEFT (HCC): ICD-10-CM

## 2023-11-02 DIAGNOSIS — T87.89 PRESSURE ULCER OF BKA STUMP, STAGE 3 (HCC): ICD-10-CM

## 2023-11-02 DIAGNOSIS — L03.116 CELLULITIS OF LEFT FOOT: ICD-10-CM

## 2023-11-02 DIAGNOSIS — L97.422 DIABETIC ULCER OF LEFT MIDFOOT ASSOCIATED WITH TYPE 2 DIABETES MELLITUS, WITH FAT LAYER EXPOSED (HCC): Primary | ICD-10-CM

## 2023-11-02 DIAGNOSIS — L89.893 PRESSURE ULCER OF BKA STUMP, STAGE 3 (HCC): ICD-10-CM

## 2023-11-02 DIAGNOSIS — E11.621 DIABETIC ULCER OF TOE OF LEFT FOOT ASSOCIATED WITH TYPE 2 DIABETES MELLITUS, WITH FAT LAYER EXPOSED (HCC): ICD-10-CM

## 2023-11-02 DIAGNOSIS — L97.522 DIABETIC ULCER OF TOE OF LEFT FOOT ASSOCIATED WITH TYPE 2 DIABETES MELLITUS, WITH FAT LAYER EXPOSED (HCC): ICD-10-CM

## 2023-11-02 DIAGNOSIS — E11.621 DIABETIC ULCER OF LEFT MIDFOOT ASSOCIATED WITH TYPE 2 DIABETES MELLITUS, WITH FAT LAYER EXPOSED (HCC): Primary | ICD-10-CM

## 2023-11-02 PROCEDURE — 97597 DBRDMT OPN WND 1ST 20 CM/<: CPT

## 2023-11-02 RX ORDER — LIDOCAINE HYDROCHLORIDE 40 MG/ML
SOLUTION TOPICAL ONCE
OUTPATIENT
Start: 2023-11-02 | End: 2023-11-02

## 2023-11-02 RX ORDER — CLOBETASOL PROPIONATE 0.5 MG/G
OINTMENT TOPICAL ONCE
OUTPATIENT
Start: 2023-11-02 | End: 2023-11-02

## 2023-11-02 RX ORDER — IBUPROFEN 200 MG
TABLET ORAL ONCE
OUTPATIENT
Start: 2023-11-02 | End: 2023-11-02

## 2023-11-02 RX ORDER — LIDOCAINE 50 MG/G
OINTMENT TOPICAL ONCE
OUTPATIENT
Start: 2023-11-02 | End: 2023-11-02

## 2023-11-02 RX ORDER — BACITRACIN ZINC AND POLYMYXIN B SULFATE 500; 1000 [USP'U]/G; [USP'U]/G
OINTMENT TOPICAL ONCE
OUTPATIENT
Start: 2023-11-02 | End: 2023-11-02

## 2023-11-02 RX ORDER — BETAMETHASONE DIPROPIONATE 0.05 %
OINTMENT (GRAM) TOPICAL ONCE
OUTPATIENT
Start: 2023-11-02 | End: 2023-11-02

## 2023-11-02 RX ORDER — GENTAMICIN SULFATE 1 MG/G
OINTMENT TOPICAL ONCE
OUTPATIENT
Start: 2023-11-02 | End: 2023-11-02

## 2023-11-02 RX ORDER — TRIAMCINOLONE ACETONIDE 1 MG/G
OINTMENT TOPICAL ONCE
OUTPATIENT
Start: 2023-11-02 | End: 2023-11-02

## 2023-11-02 RX ORDER — LIDOCAINE 40 MG/G
CREAM TOPICAL ONCE
OUTPATIENT
Start: 2023-11-02 | End: 2023-11-02

## 2023-11-02 RX ORDER — GINSENG 100 MG
CAPSULE ORAL ONCE
OUTPATIENT
Start: 2023-11-02 | End: 2023-11-02

## 2023-11-02 NOTE — PROGRESS NOTES
Wound Care Center Progress Note With Procedure    Umesh Duffy  AGE: 37 y.o. GENDER: male  : 1980  EPISODE DATE:  2023     Subjective:       CHIEF COMPLAINT  WOUND   Chief Complaint   Patient presents with    Wound Check         HISTORY of PRESENT ILLNESS      Umesh Duffy is a 37 y.o. 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 The ulcer is of marked severity. The underlying cause of the wound is diabetes, non-healing surgical. He presents today with a new wound to the right plantar foot that is diabetic and pressure in nature and of moderate severity. The patient has significant underlying medical conditions as below. The patient is having significant depression related to the recent and sudden death of his mother. RAFAEL Maldonado CNP is PCP last seen 6/15/23. Wound Pain Timing/Severity: none  Quality of pain: N/A  Severity of pain:  0 / 10   Modifying Factors: diabetes and chronic pressure  Associated Signs/Symptoms: drainage     Diabetes: Yes, on an oral and insulin regimen  Hemoglobin A1C   Date Value Ref Range Status   2023 11.8 See comment % Final     Comment:     Comment:  Diagnosis of Diabetes: > or = 6.5%  Increased risk of diabetes (Prediabetes): 5.7-6.4%  Glycemic Control: Nonpregnant Adults: <7.0%                    Pregnant: <6.0%          Diabetes education provided today:    Diabetes pathoetiology, difference between type 1 and type 2 diabetes, and progressive nature of Type 2 DM. Diabetic Neuropathy: signs and therapy. Foot care: advised to wash feet daily, pat dry and apply lotion at night, avoiding between toes. Need to look at feet daily and report to a physician any signs of inflammation or skin damage. Discussed diabetes shoes and socks.   Diabetic management related to wound care    Smoking: Never

## 2023-11-02 NOTE — PATIENT INSTRUCTIONS
1 week   Primary Wound Care Provider: Karan Dinero CNP   Call  for any questions or concerns.   Central [phone]

## 2023-12-07 ENCOUNTER — HOSPITAL ENCOUNTER (OUTPATIENT)
Dept: WOUND CARE | Age: 43
Discharge: HOME | End: 2023-12-07
Payer: MEDICARE

## 2023-12-07 VITALS
RESPIRATION RATE: 18 BRPM | SYSTOLIC BLOOD PRESSURE: 135 MMHG | TEMPERATURE: 98.1 F | HEART RATE: 95 BPM | DIASTOLIC BLOOD PRESSURE: 82 MMHG

## 2023-12-07 DIAGNOSIS — L97.522 DIABETIC ULCER OF TOE OF LEFT FOOT ASSOCIATED WITH TYPE 2 DIABETES MELLITUS, WITH FAT LAYER EXPOSED (HCC): ICD-10-CM

## 2023-12-07 DIAGNOSIS — L97.422 DIABETIC ULCER OF LEFT MIDFOOT ASSOCIATED WITH TYPE 2 DIABETES MELLITUS, WITH FAT LAYER EXPOSED (HCC): Primary | ICD-10-CM

## 2023-12-07 DIAGNOSIS — L03.116 CELLULITIS OF LEFT FOOT: ICD-10-CM

## 2023-12-07 DIAGNOSIS — E11.621 DIABETIC ULCER OF TOE OF LEFT FOOT ASSOCIATED WITH TYPE 2 DIABETES MELLITUS, WITH FAT LAYER EXPOSED (HCC): ICD-10-CM

## 2023-12-07 DIAGNOSIS — E11.621 DIABETIC ULCER OF LEFT MIDFOOT ASSOCIATED WITH TYPE 2 DIABETES MELLITUS, WITH FAT LAYER EXPOSED (HCC): Primary | ICD-10-CM

## 2023-12-07 DIAGNOSIS — Z89.432 PARTIAL NONTRAUMATIC AMPUTATION OF FOOT, LEFT (HCC): ICD-10-CM

## 2023-12-07 PROCEDURE — 11056 PARNG/CUTG B9 HYPRKR LES 2-4: CPT

## 2023-12-07 PROCEDURE — 97597 DBRDMT OPN WND 1ST 20 CM/<: CPT

## 2023-12-07 RX ORDER — LIDOCAINE 50 MG/G
OINTMENT TOPICAL ONCE
OUTPATIENT
Start: 2023-12-07 | End: 2023-12-07

## 2023-12-07 RX ORDER — LIDOCAINE 40 MG/G
CREAM TOPICAL ONCE
OUTPATIENT
Start: 2023-12-07 | End: 2023-12-07

## 2023-12-07 RX ORDER — BACITRACIN ZINC AND POLYMYXIN B SULFATE 500; 1000 [USP'U]/G; [USP'U]/G
OINTMENT TOPICAL ONCE
OUTPATIENT
Start: 2023-12-07 | End: 2023-12-07

## 2023-12-07 RX ORDER — TRIAMCINOLONE ACETONIDE 1 MG/G
OINTMENT TOPICAL ONCE
OUTPATIENT
Start: 2023-12-07 | End: 2023-12-07

## 2023-12-07 RX ORDER — BETAMETHASONE DIPROPIONATE 0.05 %
OINTMENT (GRAM) TOPICAL ONCE
OUTPATIENT
Start: 2023-12-07 | End: 2023-12-07

## 2023-12-07 RX ORDER — LIDOCAINE HYDROCHLORIDE 40 MG/ML
SOLUTION TOPICAL ONCE
OUTPATIENT
Start: 2023-12-07 | End: 2023-12-07

## 2023-12-07 RX ORDER — CLOBETASOL PROPIONATE 0.5 MG/G
OINTMENT TOPICAL ONCE
OUTPATIENT
Start: 2023-12-07 | End: 2023-12-07

## 2023-12-07 RX ORDER — GENTAMICIN SULFATE 1 MG/G
OINTMENT TOPICAL ONCE
OUTPATIENT
Start: 2023-12-07 | End: 2023-12-07

## 2023-12-07 RX ORDER — GINSENG 100 MG
CAPSULE ORAL ONCE
OUTPATIENT
Start: 2023-12-07 | End: 2023-12-07

## 2023-12-07 RX ORDER — IBUPROFEN 200 MG
TABLET ORAL ONCE
OUTPATIENT
Start: 2023-12-07 | End: 2023-12-07

## 2023-12-07 ASSESSMENT — PAIN SCALES - GENERAL: PAINLEVEL_OUTOF10: 0

## 2023-12-07 NOTE — PATIENT INSTRUCTIONS
PHYSICIAN ORDERS AND DISCHARGE INSTRUCTIONS     NOTE: Upon discharge from the  [facility], you will receive a patient experience survey. We would be grateful if you would take the time to fill this survey out. Wound cleansing:              Do not scrub or use excessive force. Wash hands with soap and water before and after dressing changes. Prior to applying a clean dressing, cleanse wound with normal saline, wound cleanser, or mild soap and water. Ask the physician or nurse before getting the wound(s) wet in a shower     Daily Wound management:              Keep weight off wounds and reposition every 2 hours. Avoid standing for long periods of time. Apply wraps/stockings in AM and remove at bedtime. If swelling is present, elevate legs to the level of the heart or above for 30 minutes 4-5 times a day and/or when sitting. When taking antibiotics take entire prescription as ordered by physician do not stop taking until medicine is all gone. Wound Care Notes:  Rx: Yane Escalante  Apply for grafts 05/11/22: aLL GRAFTS approved 06/08/22 for Left Toe amp site  Reapply for graft 08/22/22 for Left toes amp site   YADY's   Right 1.07      Left    1.1           Date: 08/15/22   Lives at [facility] assisted living         Grafts Left Foot Amp Site  Puraply #1 Graft #1 4x4 fenestrated 06/15/22   Puraply #2 Graft #2 4x4 fenestrated 06/22/22                                    Orders for this week: 12/7/2023      FAX ORDERS TO [id number] [facility]! A&D to left foot and leg  Right leg amp site - Wash with soap and water, pat dry. Apply Shwetha to wound bed. Cover with silicone border. Change every 2-3 days. Dispense 30 day quantity when ordering supplies. Follow Up Instructions:  At the [address] [place] [address] in 2 weeks  Primary Wound Care Provider: Nakita Parham

## 2023-12-07 NOTE — PLAN OF CARE
Demographics info for supply order on 23. Electronically signed by Heber Parker RN on 2023 at 12:53 PM      Patient Info:    [address]   : 1980  Age: 37 y.o. Gender: male  Todays Date: 2023    Insurance Info:    Plan: MEDICARE PART A AND B  Coverage: MEDICARE  Effective Date: 10/1/2022  Group Number: [unfilled]  Subscriber Number: [id number] - (Medicare)    Payer/Plan Subscr  Sex Relation Sub. Ins. ID Effective Group Num   1. MEDICARE - MESavilla Collar 1980 Male Self [id number] 10/1/22                                    [address]    2.  MEDICAID Teresita Jamaal 1980 Male Self [id number]                                    [address] [facility] [place]

## 2023-12-07 NOTE — PROGRESS NOTES
Wound Care Center Progress Note With Procedure    Lorenzo Mcnally  AGE: 37 y.o. GENDER: male  : 1980  EPISODE DATE:  2023     Subjective:       CHIEF COMPLAINT  WOUND   Chief Complaint   Patient presents with    Wound Check         HISTORY of PRESENT ILLNESS      Lorenzo Mcnally is a 37 y.o. 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 The ulcer is of marked severity. The underlying cause of the wound is diabetes, non-healing surgical. He presents today with a new wound to the right plantar foot that is diabetic and pressure in nature and of moderate severity. The patient has significant underlying medical conditions as below. The patient is having significant depression related to the recent and sudden death of his mother. RAFAEL Payne CNP is PCP last seen 6/15/23. Wound Pain Timing/Severity: none  Quality of pain: N/A  Severity of pain:  0 / 10   Modifying Factors: diabetes and chronic pressure  Associated Signs/Symptoms: drainage     Diabetes: Yes, on an oral and insulin regimen  Hemoglobin A1C   Date Value Ref Range Status   2023 11.8 See comment % Final     Comment:     Comment:  Diagnosis of Diabetes: > or = 6.5%  Increased risk of diabetes (Prediabetes): 5.7-6.4%  Glycemic Control: Nonpregnant Adults: <7.0%                    Pregnant: <6.0%          Diabetes education provided today:    Diabetes pathoetiology, difference between type 1 and type 2 diabetes, and progressive nature of Type 2 DM. Diabetic Neuropathy: signs and therapy. Foot care: advised to wash feet daily, pat dry and apply lotion at night, avoiding between toes. Need to look at feet daily and report to a physician any signs of inflammation or skin damage. Discussed diabetes shoes and socks.   Diabetic management related to wound care    Smoking: Never

## 2023-12-28 ENCOUNTER — HOSPITAL ENCOUNTER (OUTPATIENT)
Dept: WOUND CARE | Age: 43
Discharge: HOME | End: 2023-12-28

## 2023-12-28 NOTE — PATIENT INSTRUCTIONS
PHYSICIAN ORDERS AND DISCHARGE INSTRUCTIONS     NOTE: Upon discharge from the  [facility], you will receive a patient experience survey. We would be grateful if you would take the time to fill this survey out. Wound cleansing:              Do not scrub or use excessive force. Wash hands with soap and water before and after dressing changes. Prior to applying a clean dressing, cleanse wound with normal saline, wound cleanser, or mild soap and water. Ask the physician or nurse before getting the wound(s) wet in a shower     Daily Wound management:              Keep weight off wounds and reposition every 2 hours. Avoid standing for long periods of time. Apply wraps/stockings in AM and remove at bedtime. If swelling is present, elevate legs to the level of the heart or above for 30 minutes 4-5 times a day and/or when sitting. When taking antibiotics take entire prescription as ordered by physician do not stop taking until medicine is all gone. Wound Care Notes:  Rx: Farhad Linares  Apply for grafts 05/11/22: aLL GRAFTS approved 06/08/22 for Left Toe amp site  Reapply for graft 08/22/22 for Left toes amp site   YADY's   Right 1.07      Left    1.1           Date: 08/15/22   Lives at [facility] assisted living         Grafts Left Foot Amp Site  Puraply #1 Graft #1 4x4 fenestrated 06/15/22   Puraply #2 Graft #2 4x4 fenestrated 06/22/22                                    Orders for this week: 12/28/2023      FAX ORDERS TO [id number] [facility]! A&D to left foot and leg  Right leg amp site - Wash with soap and water, pat dry. Apply Shwetha to wound bed. Cover with silicone border. Change every 2-3 days. Dispense 30 day quantity when ordering supplies. Follow Up Instructions:  At the [address] [place] [address] in 2 weeks  Primary Wound Care Provider: Moy Walters

## 2024-01-10 NOTE — PATIENT INSTRUCTIONS
PHYSICIAN ORDERS AND DISCHARGE INSTRUCTIONS     NOTE: Upon discharge from the Wound Center, you will receive a patient experience survey. We would be grateful if you would take the time to fill this survey out.     Wound cleansing:              Do not scrub or use excessive force.              Wash hands with soap and water before and after dressing changes.              Prior to applying a clean dressing, cleanse wound with normal saline, wound cleanser, or mild soap and water.              Ask the physician or nurse before getting the wound(s) wet in a shower     Daily Wound management:              Keep weight off wounds and reposition every 2 hours.              Avoid standing for long periods of time.              Apply wraps/stockings in AM and remove at bedtime.              If swelling is present, elevate legs to the level of the heart or above for 30 minutes 4-5 times a day and/or when sitting.                                      When taking antibiotics take entire prescription as ordered by physician do not stop taking until medicine is all gone.                              Wound Care Notes:  Rx: [address] [facility]  Apply for grafts 05/11/22: aLL GRAFTS approved 06/08/22 for Left Toe amp site  Reapply for graft 08/22/22 for Left toes amp site   YADY's   Right 1.07      Left    1.1           Date: 08/15/22   Lives at [facility] assisted living         Grafts Left Foot Amp Site  Puraply #1 Graft #1 4x4 fenestrated 06/15/22   Puraply #2 Graft #2 4x4 fenestrated 06/22/22                                    Orders for this week: 1/10/2024      FAX ORDERS TO [facility]!    A&D to left foot and leg  Right leg amp site - Wash with soap and water, pat dry.  Apply Puracol to wound bed.  Cover with calcium alginate and gentac.   Secure with medipore tape   Change Daily     Wheelchair Repair: Rehab Medical Rep [facility]. [phone]     Dispense 30 day quantity when ordering supplies.        Follow Up

## 2024-01-11 ENCOUNTER — HOSPITAL ENCOUNTER (OUTPATIENT)
Dept: WOUND CARE | Age: 44
Discharge: HOME | End: 2024-01-11
Payer: COMMERCIAL

## 2024-01-11 VITALS
TEMPERATURE: 96.9 F | HEART RATE: 95 BPM | DIASTOLIC BLOOD PRESSURE: 94 MMHG | SYSTOLIC BLOOD PRESSURE: 146 MMHG | RESPIRATION RATE: 18 BRPM

## 2024-01-11 DIAGNOSIS — E11.621 DIABETIC ULCER OF LEFT MIDFOOT ASSOCIATED WITH TYPE 2 DIABETES MELLITUS, WITH FAT LAYER EXPOSED (HCC): Primary | ICD-10-CM

## 2024-01-11 DIAGNOSIS — L97.422 DIABETIC ULCER OF LEFT MIDFOOT ASSOCIATED WITH TYPE 2 DIABETES MELLITUS, WITH FAT LAYER EXPOSED (HCC): Primary | ICD-10-CM

## 2024-01-11 DIAGNOSIS — L97.522 DIABETIC ULCER OF TOE OF LEFT FOOT ASSOCIATED WITH TYPE 2 DIABETES MELLITUS, WITH FAT LAYER EXPOSED (HCC): ICD-10-CM

## 2024-01-11 DIAGNOSIS — L03.116 CELLULITIS OF LEFT FOOT: ICD-10-CM

## 2024-01-11 DIAGNOSIS — E11.621 DIABETIC ULCER OF TOE OF LEFT FOOT ASSOCIATED WITH TYPE 2 DIABETES MELLITUS, WITH FAT LAYER EXPOSED (HCC): ICD-10-CM

## 2024-01-11 DIAGNOSIS — Z89.432 PARTIAL NONTRAUMATIC AMPUTATION OF FOOT, LEFT (HCC): ICD-10-CM

## 2024-01-11 PROCEDURE — 11042 DBRDMT SUBQ TIS 1ST 20SQCM/<: CPT

## 2024-01-11 PROCEDURE — 11042 DBRDMT SUBQ TIS 1ST 20SQCM/<: CPT | Performed by: NURSE PRACTITIONER

## 2024-01-11 RX ORDER — IBUPROFEN 200 MG
TABLET ORAL ONCE
OUTPATIENT
Start: 2024-01-11 | End: 2024-01-11

## 2024-01-11 RX ORDER — BACITRACIN ZINC AND POLYMYXIN B SULFATE 500; 1000 [USP'U]/G; [USP'U]/G
OINTMENT TOPICAL ONCE
OUTPATIENT
Start: 2024-01-11 | End: 2024-01-11

## 2024-01-11 RX ORDER — LIDOCAINE 40 MG/G
CREAM TOPICAL ONCE
OUTPATIENT
Start: 2024-01-11 | End: 2024-01-11

## 2024-01-11 RX ORDER — BETAMETHASONE DIPROPIONATE 0.05 %
OINTMENT (GRAM) TOPICAL ONCE
OUTPATIENT
Start: 2024-01-11 | End: 2024-01-11

## 2024-01-11 RX ORDER — TRIAMCINOLONE ACETONIDE 1 MG/G
OINTMENT TOPICAL ONCE
OUTPATIENT
Start: 2024-01-11 | End: 2024-01-11

## 2024-01-11 RX ORDER — GENTAMICIN SULFATE 1 MG/G
OINTMENT TOPICAL ONCE
OUTPATIENT
Start: 2024-01-11 | End: 2024-01-11

## 2024-01-11 RX ORDER — CLOBETASOL PROPIONATE 0.5 MG/G
OINTMENT TOPICAL ONCE
OUTPATIENT
Start: 2024-01-11 | End: 2024-01-11

## 2024-01-11 RX ORDER — LIDOCAINE 50 MG/G
OINTMENT TOPICAL ONCE
OUTPATIENT
Start: 2024-01-11 | End: 2024-01-11

## 2024-01-11 RX ORDER — LIDOCAINE HYDROCHLORIDE 40 MG/ML
SOLUTION TOPICAL ONCE
OUTPATIENT
Start: 2024-01-11 | End: 2024-01-11

## 2024-01-11 RX ORDER — GINSENG 100 MG
CAPSULE ORAL ONCE
OUTPATIENT
Start: 2024-01-11 | End: 2024-01-11

## 2024-01-11 ASSESSMENT — PAIN SCALES - GENERAL: PAINLEVEL_OUTOF10: 0

## 2024-01-11 NOTE — PROGRESS NOTES
Patient Info:    Niall Howard  [facility]  [address]  [facility] [address]  [phone]  : 1980  Age: 43 y.o.  Gender: male  Todays Date: 2024    Insurance Info:    Plan: LAURA JESUS [place] DUAL  [facility]  Effective Date: 2024  Group Number: [unfilled]  Subscriber Number: [id number] - (Medicare Managed)    Payer/Plan Subscr  Sex Relation Sub. Ins. ID Effective Group Num   1. MEDICARE - ME* NIALL HOWARD 1980 Male Self [id number] 10/1/22                                    [address]    2. MEDICARE - ME* NIALL HOWARD 1980 Male Self [id number] 10/1/22                                    [address]        
cream to protect the skin.        PAST MEDICAL HISTORY        Diagnosis Date    Abscess of left foot 4/22/2022    Anemia associated with acute blood loss 4/26/2022    Callus of foot     Right foot - see's Dr. Gutierrez    Cellulitis of left foot 4/22/2022    Diabetic ulcer of left midfoot associated with type 2 diabetes mellitus, with muscle involvement without evidence of necrosis (HCC) 4/26/2022    Diabetic ulcer of left midfoot associated with type 2 diabetes mellitus, with necrosis of muscle (HCC) 6/7/2021    Diabetic ulcer of right midfoot associated with type 2 diabetes mellitus, with fat layer exposed (HCC) 8/11/2020    Dizziness     positional    Essential hypertension     Follows with PCP    Hyperlipidemia     Ketosis ([facility]) 1/10/2023    Lumbar radiculopathy     Septic embolism ([facility]) 6/13/2020    Subacute osteomyelitis of left foot ([facility]) 4/22/2022    Type 2 diabetes mellitus, with long-term current use of insulin (HCC)        PAST SURGICAL HISTORY    Past Surgical History:   Procedure Laterality Date    ACHILLES TENDON SURGERY Right 1/8/2021    RIGHT ACHILLES TENDON LENGTHENING REPAIR performed by James Gutierrez DPM at [facility] OR    CARDIAC CATHETERIZATION  03/2018    [facility]    DENTAL SURGERY      teeth extractions -half per patient    HERNIA REPAIR Bilateral 5/27/2020    BIALTERAL HERNIA INGUINAL REPAIR performed by Steve Barajas II, MD at [facility] OR    LEG AMPUTATION BELOW KNEE Right 9/2/2022    LEG AMPUTATION BELOW KNEE performed by Carol Minor MD at [facility] OR    LEG AMPUTATION BELOW KNEE Right 9/5/2022    LEG AMPUTATION BELOW KNEE REVISION performed by Carol Minor MD at [facility] OR    LUMBAR LAMINECTOMY  2018    HI OFFICE/OUTPT VISIT,PROCEDURE ONLY Left 4/17/2018    L5-S1 HEMILAMINECTOMY, REMOVAL OF DISC LEFT SIDE performed by William Escudero MD at [facility] OR    TOE AMPUTATION Right 1/8/2021    RIGHT TRANSMETATARSAL TOE AMPUTATION performed by James Gutierrez DPM at [facility] OR    TOE

## 2024-01-25 ENCOUNTER — HOSPITAL ENCOUNTER (OUTPATIENT)
Dept: WOUND CARE | Age: 44
Discharge: HOME | End: 2024-01-25
Payer: COMMERCIAL

## 2024-01-25 VITALS — RESPIRATION RATE: 18 BRPM | DIASTOLIC BLOOD PRESSURE: 74 MMHG | HEART RATE: 82 BPM | SYSTOLIC BLOOD PRESSURE: 138 MMHG

## 2024-01-25 DIAGNOSIS — E11.621 DIABETIC ULCER OF TOE OF LEFT FOOT ASSOCIATED WITH TYPE 2 DIABETES MELLITUS, WITH FAT LAYER EXPOSED (HCC): ICD-10-CM

## 2024-01-25 DIAGNOSIS — L89.893 PRESSURE ULCER OF BKA STUMP, STAGE 3 (HCC): ICD-10-CM

## 2024-01-25 DIAGNOSIS — E11.621 DIABETIC ULCER OF LEFT MIDFOOT ASSOCIATED WITH TYPE 2 DIABETES MELLITUS, WITH FAT LAYER EXPOSED (HCC): ICD-10-CM

## 2024-01-25 DIAGNOSIS — Z89.432 PARTIAL NONTRAUMATIC AMPUTATION OF FOOT, LEFT (HCC): ICD-10-CM

## 2024-01-25 DIAGNOSIS — L97.522 DIABETIC ULCER OF TOE OF LEFT FOOT ASSOCIATED WITH TYPE 2 DIABETES MELLITUS, WITH FAT LAYER EXPOSED (HCC): ICD-10-CM

## 2024-01-25 DIAGNOSIS — T87.89 PRESSURE ULCER OF BKA STUMP, STAGE 3 (HCC): ICD-10-CM

## 2024-01-25 DIAGNOSIS — L97.422 DIABETIC ULCER OF LEFT MIDFOOT ASSOCIATED WITH TYPE 2 DIABETES MELLITUS, WITH FAT LAYER EXPOSED (HCC): ICD-10-CM

## 2024-01-25 DIAGNOSIS — L03.116 CELLULITIS OF LEFT FOOT: Primary | ICD-10-CM

## 2024-01-25 PROCEDURE — 11042 DBRDMT SUBQ TIS 1ST 20SQCM/<: CPT

## 2024-01-25 RX ORDER — GINSENG 100 MG
CAPSULE ORAL ONCE
OUTPATIENT
Start: 2024-01-25 | End: 2024-01-25

## 2024-01-25 RX ORDER — TRIAMCINOLONE ACETONIDE 1 MG/G
OINTMENT TOPICAL ONCE
OUTPATIENT
Start: 2024-01-25 | End: 2024-01-25

## 2024-01-25 RX ORDER — IBUPROFEN 200 MG
TABLET ORAL ONCE
OUTPATIENT
Start: 2024-01-25 | End: 2024-01-25

## 2024-01-25 RX ORDER — BACITRACIN ZINC AND POLYMYXIN B SULFATE 500; 1000 [USP'U]/G; [USP'U]/G
OINTMENT TOPICAL ONCE
OUTPATIENT
Start: 2024-01-25 | End: 2024-01-25

## 2024-01-25 RX ORDER — GENTAMICIN SULFATE 1 MG/G
OINTMENT TOPICAL ONCE
OUTPATIENT
Start: 2024-01-25 | End: 2024-01-25

## 2024-01-25 RX ORDER — LIDOCAINE 50 MG/G
OINTMENT TOPICAL ONCE
OUTPATIENT
Start: 2024-01-25 | End: 2024-01-25

## 2024-01-25 RX ORDER — LIDOCAINE 40 MG/G
CREAM TOPICAL ONCE
OUTPATIENT
Start: 2024-01-25 | End: 2024-01-25

## 2024-01-25 RX ORDER — BETAMETHASONE DIPROPIONATE 0.05 %
OINTMENT (GRAM) TOPICAL ONCE
OUTPATIENT
Start: 2024-01-25 | End: 2024-01-25

## 2024-01-25 RX ORDER — CLOBETASOL PROPIONATE 0.5 MG/G
OINTMENT TOPICAL ONCE
OUTPATIENT
Start: 2024-01-25 | End: 2024-01-25

## 2024-01-25 RX ORDER — LIDOCAINE HYDROCHLORIDE 40 MG/ML
SOLUTION TOPICAL ONCE
OUTPATIENT
Start: 2024-01-25 | End: 2024-01-25

## 2024-01-25 ASSESSMENT — PAIN - FUNCTIONAL ASSESSMENT: PAIN_FUNCTIONAL_ASSESSMENT: PREVENTS OR INTERFERES SOME ACTIVE ACTIVITIES AND ADLS

## 2024-01-25 ASSESSMENT — PAIN DESCRIPTION - LOCATION: LOCATION: LEG

## 2024-01-25 ASSESSMENT — PAIN SCALES - GENERAL: PAINLEVEL_OUTOF10: 7

## 2024-01-25 ASSESSMENT — PAIN DESCRIPTION - ONSET: ONSET: AWAKENED FROM SLEEP

## 2024-01-25 ASSESSMENT — PAIN SCALES - WONG BAKER: WONGBAKER_NUMERICALRESPONSE: 0

## 2024-01-25 ASSESSMENT — PAIN DESCRIPTION - ORIENTATION: ORIENTATION: RIGHT

## 2024-01-25 ASSESSMENT — PAIN DESCRIPTION - DESCRIPTORS: DESCRIPTORS: TENDER

## 2024-01-25 ASSESSMENT — PAIN DESCRIPTION - PAIN TYPE: TYPE: ACUTE PAIN

## 2024-01-25 ASSESSMENT — PAIN DESCRIPTION - FREQUENCY: FREQUENCY: CONTINUOUS

## 2024-01-25 NOTE — PATIENT INSTRUCTIONS
PHYSICIAN ORDERS AND DISCHARGE INSTRUCTIONS     NOTE: Upon discharge from the Wound Center, you will receive a patient experience survey. We would be grateful if you would take the time to fill this survey out.     Wound cleansing:              Do not scrub or use excessive force.              Wash hands with soap and water before and after dressing changes.              Prior to applying a clean dressing, cleanse wound with normal saline, wound cleanser, or mild soap and water.              Ask the physician or nurse before getting the wound(s) wet in a shower     Daily Wound management:              Keep weight off wounds and reposition every 2 hours.              Avoid standing for long periods of time.              Apply wraps/stockings in AM and remove at bedtime.              If swelling is present, elevate legs to the level of the heart or above for 30 minutes 4-5 times a day and/or when sitting.                                      When taking antibiotics take entire prescription as ordered by physician do not stop taking until medicine is all gone.                              Wound Care Notes:  Rx: [address] [facility]  Apply for grafts 05/11/22: aLL GRAFTS approved 06/08/22 for Left Toe amp site  Reapply for graft 08/22/22 for Left toes amp site   YADY's   Right 1.07      Left    1.1           Date: 08/15/22   Lives at [place] [facility] assisted living         Grafts Left Foot Amp Site  Puraply #1 Graft #1 4x4 fenestrated 06/15/22   Puraply #2 Graft #2 4x4 fenestrated 06/22/22                                    Orders for this week: 1/25/2024      FAX ORDERS TO [facility]!    A&D to left foot and leg  Right leg amp site - Wash with soap and water, pat dry.  Apply Puracol to wound bed.  Cover with calcium alginate and gentac.   Secure with medipore tape   Change Daily     Wheelchair Repair: Rehab Medical Rep [facility]. [phone]     Dispense 30 day quantity when ordering supplies.        Follow Up

## 2024-02-01 ENCOUNTER — HOSPITAL ENCOUNTER (OUTPATIENT)
Dept: WOUND CARE | Age: 44
Discharge: HOME | End: 2024-02-01
Attending: NURSE PRACTITIONER
Payer: COMMERCIAL

## 2024-02-01 VITALS
HEART RATE: 92 BPM | RESPIRATION RATE: 17 BRPM | DIASTOLIC BLOOD PRESSURE: 80 MMHG | SYSTOLIC BLOOD PRESSURE: 137 MMHG | TEMPERATURE: 97.4 F

## 2024-02-01 DIAGNOSIS — Z89.432 PARTIAL NONTRAUMATIC AMPUTATION OF FOOT, LEFT (HCC): ICD-10-CM

## 2024-02-01 DIAGNOSIS — L97.422 DIABETIC ULCER OF LEFT MIDFOOT ASSOCIATED WITH TYPE 2 DIABETES MELLITUS, WITH FAT LAYER EXPOSED (HCC): ICD-10-CM

## 2024-02-01 DIAGNOSIS — E11.621 DIABETIC ULCER OF LEFT MIDFOOT ASSOCIATED WITH TYPE 2 DIABETES MELLITUS, WITH FAT LAYER EXPOSED (HCC): ICD-10-CM

## 2024-02-01 DIAGNOSIS — L03.116 CELLULITIS OF LEFT FOOT: Primary | ICD-10-CM

## 2024-02-01 DIAGNOSIS — L97.522 DIABETIC ULCER OF TOE OF LEFT FOOT ASSOCIATED WITH TYPE 2 DIABETES MELLITUS, WITH FAT LAYER EXPOSED (HCC): ICD-10-CM

## 2024-02-01 DIAGNOSIS — E11.621 DIABETIC ULCER OF TOE OF LEFT FOOT ASSOCIATED WITH TYPE 2 DIABETES MELLITUS, WITH FAT LAYER EXPOSED (HCC): ICD-10-CM

## 2024-02-01 PROCEDURE — 11042 DBRDMT SUBQ TIS 1ST 20SQCM/<: CPT | Performed by: NURSE PRACTITIONER

## 2024-02-01 PROCEDURE — 11042 DBRDMT SUBQ TIS 1ST 20SQCM/<: CPT

## 2024-02-01 RX ORDER — BACITRACIN ZINC AND POLYMYXIN B SULFATE 500; 1000 [USP'U]/G; [USP'U]/G
OINTMENT TOPICAL ONCE
OUTPATIENT
Start: 2024-02-01 | End: 2024-02-01

## 2024-02-01 RX ORDER — LIDOCAINE 40 MG/G
CREAM TOPICAL ONCE
OUTPATIENT
Start: 2024-02-01 | End: 2024-02-01

## 2024-02-01 RX ORDER — GINSENG 100 MG
CAPSULE ORAL ONCE
OUTPATIENT
Start: 2024-02-01 | End: 2024-02-01

## 2024-02-01 RX ORDER — TRIAMCINOLONE ACETONIDE 1 MG/G
OINTMENT TOPICAL ONCE
OUTPATIENT
Start: 2024-02-01 | End: 2024-02-01

## 2024-02-01 RX ORDER — CLOBETASOL PROPIONATE 0.5 MG/G
OINTMENT TOPICAL ONCE
OUTPATIENT
Start: 2024-02-01 | End: 2024-02-01

## 2024-02-01 RX ORDER — LIDOCAINE HYDROCHLORIDE 40 MG/ML
SOLUTION TOPICAL ONCE
OUTPATIENT
Start: 2024-02-01 | End: 2024-02-01

## 2024-02-01 RX ORDER — BETAMETHASONE DIPROPIONATE 0.05 %
OINTMENT (GRAM) TOPICAL ONCE
OUTPATIENT
Start: 2024-02-01 | End: 2024-02-01

## 2024-02-01 RX ORDER — IBUPROFEN 200 MG
TABLET ORAL ONCE
OUTPATIENT
Start: 2024-02-01 | End: 2024-02-01

## 2024-02-01 RX ORDER — GENTAMICIN SULFATE 1 MG/G
OINTMENT TOPICAL ONCE
OUTPATIENT
Start: 2024-02-01 | End: 2024-02-01

## 2024-02-01 RX ORDER — LIDOCAINE 50 MG/G
OINTMENT TOPICAL ONCE
OUTPATIENT
Start: 2024-02-01 | End: 2024-02-01

## 2024-02-01 ASSESSMENT — PAIN - FUNCTIONAL ASSESSMENT: PAIN_FUNCTIONAL_ASSESSMENT: PREVENTS OR INTERFERES SOME ACTIVE ACTIVITIES AND ADLS

## 2024-02-01 ASSESSMENT — PAIN DESCRIPTION - ORIENTATION: ORIENTATION: RIGHT

## 2024-02-01 ASSESSMENT — PAIN DESCRIPTION - FREQUENCY: FREQUENCY: CONTINUOUS

## 2024-02-01 ASSESSMENT — PAIN SCALES - WONG BAKER: WONGBAKER_NUMERICALRESPONSE: 0

## 2024-02-01 ASSESSMENT — PAIN SCALES - GENERAL: PAINLEVEL_OUTOF10: 7

## 2024-02-01 ASSESSMENT — PAIN DESCRIPTION - PAIN TYPE: TYPE: ACUTE PAIN

## 2024-02-01 ASSESSMENT — PAIN DESCRIPTION - DESCRIPTORS: DESCRIPTORS: THROBBING;TIGHTNESS

## 2024-02-01 ASSESSMENT — PAIN DESCRIPTION - LOCATION: LOCATION: LEG

## 2024-02-01 NOTE — PATIENT INSTRUCTIONS
PHYSICIAN ORDERS AND DISCHARGE INSTRUCTIONS     NOTE: Upon discharge from the Wound Center, you will receive a patient experience survey. We would be grateful if you would take the time to fill this survey out.     Wound cleansing:              Do not scrub or use excessive force.              Wash hands with soap and water before and after dressing changes.              Prior to applying a clean dressing, cleanse wound with normal saline, wound cleanser, or mild soap and water.              Ask the physician or nurse before getting the wound(s) wet in a shower     Daily Wound management:              Keep weight off wounds and reposition every 2 hours.              Avoid standing for long periods of time.              Apply wraps/stockings in AM and remove at bedtime.              If swelling is present, elevate legs to the level of the heart or above for 30 minutes 4-5 times a day and/or when sitting.                                      When taking antibiotics take entire prescription as ordered by physician do not stop taking until medicine is all gone.                              Wound Care Notes:  Rx: [address] [facility]  Apply for grafts 05/11/22: aLL GRAFTS approved 06/08/22 for Left Toe amp site  Reapply for graft 08/22/22 for Left toes amp site   YADY's   Right 1.07      Left    1.1           Date: 08/15/22   Lives at [facility] assisted living         Grafts Left Foot Amp Site  Puraply #1 Graft #1 4x4 fenestrated 06/15/22   Puraply #2 Graft #2 4x4 fenestrated 06/22/22                                    Orders for this week: 2/1/2024      FAX ORDERS TO [facility]!    A&D to left foot and leg  Right leg amp site - Wash with soap and water, pat dry.  Bolster with steristrips   Apply stimulen and sorbact  to wound bed.  Apply qwick to periwound and secure with steristrips   Cover with tegaderm   Secure with medipore tape   Change 2 times per day     Wheelchair Repair: Rehab Medical Rep Jose CASTILLO 
Medical Assessment Completed on: 01-Nov-2023 00:32

## 2024-02-01 NOTE — PROGRESS NOTES
Tunneling Position ___ O'Clock 0 02/01/24 0932   Undermining Starts ___ O'Clock 0 02/01/24 0932   Undermining Ends___ O'Clock 0 02/01/24 0932   Undermining Maxium Distance (cm) 0 02/01/24 0932   Wound Assessment Pink/red 02/01/24 0932   Drainage Amount Small (< 25%) 02/01/24 0932   Drainage Description Serosanguinous 02/01/24 0932   Odor None 02/01/24 0932   Rosalie-wound Assessment Fragile 02/01/24 0932   Margins Defined edges 02/01/24 0932   Wound Thickness Description not for Pressure Injury Full thickness 02/01/24 0932   Number of days: 161     Total  Area  Debrided: 0.48 sq cm     Bleeding:  Minimal    Hemostasis Achieved:  by pressure    Procedural Pain:  0  / 10     Post Procedural Pain:  0 / 10     Response to treatment:  Well tolerated by patient.    Status of wound progress and description from last visit: Larger, using prosthetic more, called Main with [employer] and discussed case, states he has revised and off loaded as much as possible with his prosthetic. Working to get his wheel chair fixed, problems with his tires. He made need a revision to successfully use his prosthetic. Will plan a visit for next week to see Dr. Washington in conjunction with myself to evaluate for revision. Discussed and established regimen with the patient as below. Working with [employer] on prosthetic process with the patient. The patients records were reviewed and discussed.  Time was given for questions . All questions were answered to the patients satisfaction. A total time of 20 minutes was spent with at least 50% related to face to face counseling and coordination of care.    Plan:     Patient Instructions   PHYSICIAN ORDERS AND DISCHARGE INSTRUCTIONS     NOTE: Upon discharge from the Wound Center, you will receive a patient experience survey. We would be grateful if you would take the time to fill this survey out.     Wound cleansing:              Do not scrub or use excessive force.              Wash

## 2024-02-08 ENCOUNTER — HOSPITAL ENCOUNTER (OUTPATIENT)
Dept: WOUND CARE | Age: 44
Discharge: HOME | End: 2024-02-08

## 2024-02-08 NOTE — PATIENT INSTRUCTIONS
PHYSICIAN ORDERS AND DISCHARGE INSTRUCTIONS     NOTE: Upon discharge from the Wound Center, you will receive a patient experience survey. We would be grateful if you would take the time to fill this survey out.     Wound cleansing:              Do not scrub or use excessive force.              Wash hands with soap and water before and after dressing changes.              Prior to applying a clean dressing, cleanse wound with normal saline, wound cleanser, or mild soap and water.              Ask the physician or nurse before getting the wound(s) wet in a shower     Daily Wound management:              Keep weight off wounds and reposition every 2 hours.              Avoid standing for long periods of time.              Apply wraps/stockings in AM and remove at bedtime.              If swelling is present, elevate legs to the level of the heart or above for 30 minutes 4-5 times a day and/or when sitting.                                      When taking antibiotics take entire prescription as ordered by physician do not stop taking until medicine is all gone.                              Wound Care Notes:  Rx: [address] [facility]  Apply for grafts 05/11/22: aLL GRAFTS approved 06/08/22 for Left Toe amp site  Reapply for graft 08/22/22 for Left toes amp site   YADY's   Right 1.07      Left    1.1           Date: 08/15/22   Lives at [facility] assisted [facility]         Grafts Left Foot Amp Site  Puraply #1 Graft #1 4x4 fenestrated 06/15/22   Puraply #2 Graft #2 4x4 fenestrated 06/22/22                                    Orders for this week: 2/8/2024      FAX ORDERS TO [facility]!    A&D to left foot and leg  Right leg amp site - Wash with soap and water, pat dry.  Bolster with steristrips   Apply stimulen and sorbact  to wound bed.  Apply qwick to periwound and secure with steristrips   Cover with tegaderm   Secure with medipore tape   Change 2 times per day     Wheelchair Repair: Rehab Medical Rep Jose CASTILLO

## 2024-02-15 ENCOUNTER — HOSPITAL ENCOUNTER (OUTPATIENT)
Dept: WOUND CARE | Age: 44
Discharge: HOME | End: 2024-02-15
Attending: NURSE PRACTITIONER
Payer: COMMERCIAL

## 2024-02-15 VITALS
TEMPERATURE: 97.8 F | SYSTOLIC BLOOD PRESSURE: 136 MMHG | DIASTOLIC BLOOD PRESSURE: 82 MMHG | HEART RATE: 85 BPM | RESPIRATION RATE: 18 BRPM

## 2024-02-15 DIAGNOSIS — L03.116 CELLULITIS OF LEFT FOOT: Primary | ICD-10-CM

## 2024-02-15 DIAGNOSIS — L97.422 DIABETIC ULCER OF LEFT MIDFOOT ASSOCIATED WITH TYPE 2 DIABETES MELLITUS, WITH FAT LAYER EXPOSED (HCC): ICD-10-CM

## 2024-02-15 DIAGNOSIS — Z89.432 PARTIAL NONTRAUMATIC AMPUTATION OF FOOT, LEFT (HCC): ICD-10-CM

## 2024-02-15 DIAGNOSIS — E11.621 DIABETIC ULCER OF TOE OF LEFT FOOT ASSOCIATED WITH TYPE 2 DIABETES MELLITUS, WITH FAT LAYER EXPOSED (HCC): ICD-10-CM

## 2024-02-15 DIAGNOSIS — L97.522 DIABETIC ULCER OF TOE OF LEFT FOOT ASSOCIATED WITH TYPE 2 DIABETES MELLITUS, WITH FAT LAYER EXPOSED (HCC): ICD-10-CM

## 2024-02-15 DIAGNOSIS — E11.621 DIABETIC ULCER OF LEFT MIDFOOT ASSOCIATED WITH TYPE 2 DIABETES MELLITUS, WITH FAT LAYER EXPOSED (HCC): ICD-10-CM

## 2024-02-15 PROCEDURE — 11042 DBRDMT SUBQ TIS 1ST 20SQCM/<: CPT

## 2024-02-15 RX ORDER — LIDOCAINE 40 MG/G
CREAM TOPICAL ONCE
OUTPATIENT
Start: 2024-02-15 | End: 2024-02-15

## 2024-02-15 RX ORDER — BACITRACIN ZINC AND POLYMYXIN B SULFATE 500; 1000 [USP'U]/G; [USP'U]/G
OINTMENT TOPICAL ONCE
OUTPATIENT
Start: 2024-02-15 | End: 2024-02-15

## 2024-02-15 RX ORDER — LIDOCAINE 50 MG/G
OINTMENT TOPICAL ONCE
OUTPATIENT
Start: 2024-02-15 | End: 2024-02-15

## 2024-02-15 RX ORDER — CLOBETASOL PROPIONATE 0.5 MG/G
OINTMENT TOPICAL ONCE
OUTPATIENT
Start: 2024-02-15 | End: 2024-02-15

## 2024-02-15 RX ORDER — GINSENG 100 MG
CAPSULE ORAL ONCE
OUTPATIENT
Start: 2024-02-15 | End: 2024-02-15

## 2024-02-15 RX ORDER — GENTAMICIN SULFATE 1 MG/G
OINTMENT TOPICAL ONCE
OUTPATIENT
Start: 2024-02-15 | End: 2024-02-15

## 2024-02-15 RX ORDER — IBUPROFEN 200 MG
TABLET ORAL ONCE
OUTPATIENT
Start: 2024-02-15 | End: 2024-02-15

## 2024-02-15 RX ORDER — BETAMETHASONE DIPROPIONATE 0.05 %
OINTMENT (GRAM) TOPICAL ONCE
OUTPATIENT
Start: 2024-02-15 | End: 2024-02-15

## 2024-02-15 RX ORDER — TRIAMCINOLONE ACETONIDE 1 MG/G
OINTMENT TOPICAL ONCE
OUTPATIENT
Start: 2024-02-15 | End: 2024-02-15

## 2024-02-15 RX ORDER — LIDOCAINE HYDROCHLORIDE 40 MG/ML
SOLUTION TOPICAL ONCE
OUTPATIENT
Start: 2024-02-15 | End: 2024-02-15

## 2024-02-15 NOTE — PATIENT INSTRUCTIONS
Padmini   Wheelchair Repair: Rehab Medical Rep [facility]. [phone]     Dispense 30 day quantity when ordering supplies.     Nurse Visit   Follow Up Instructions: At the Wound Care Center:1 week: See Dr Washington ranjitday morning with Mallory   Primary Wound Care Provider: Mallory Marinelli CNP   Call  for any questions or concerns.  Central [phone]

## 2024-02-20 ENCOUNTER — HOSPITAL ENCOUNTER (OUTPATIENT)
Dept: WOUND CARE | Age: 44
Discharge: HOME | End: 2024-02-20
Attending: NURSE PRACTITIONER
Payer: COMMERCIAL

## 2024-02-20 VITALS
SYSTOLIC BLOOD PRESSURE: 126 MMHG | TEMPERATURE: 97.8 F | DIASTOLIC BLOOD PRESSURE: 84 MMHG | RESPIRATION RATE: 16 BRPM | HEART RATE: 82 BPM

## 2024-02-20 DIAGNOSIS — L97.522 DIABETIC ULCER OF TOE OF LEFT FOOT ASSOCIATED WITH TYPE 2 DIABETES MELLITUS, WITH FAT LAYER EXPOSED (HCC): ICD-10-CM

## 2024-02-20 DIAGNOSIS — Z89.432 PARTIAL NONTRAUMATIC AMPUTATION OF FOOT, LEFT (HCC): ICD-10-CM

## 2024-02-20 DIAGNOSIS — L97.422 DIABETIC ULCER OF LEFT MIDFOOT ASSOCIATED WITH TYPE 2 DIABETES MELLITUS, WITH FAT LAYER EXPOSED (HCC): ICD-10-CM

## 2024-02-20 DIAGNOSIS — L03.116 CELLULITIS OF LEFT FOOT: Primary | ICD-10-CM

## 2024-02-20 DIAGNOSIS — E11.621 DIABETIC ULCER OF LEFT MIDFOOT ASSOCIATED WITH TYPE 2 DIABETES MELLITUS, WITH FAT LAYER EXPOSED (HCC): ICD-10-CM

## 2024-02-20 DIAGNOSIS — E11.621 DIABETIC ULCER OF TOE OF LEFT FOOT ASSOCIATED WITH TYPE 2 DIABETES MELLITUS, WITH FAT LAYER EXPOSED (HCC): ICD-10-CM

## 2024-02-20 PROCEDURE — 11043 DBRDMT MUSC&/FSCA 1ST 20/<: CPT | Performed by: SURGERY

## 2024-02-20 PROCEDURE — 11042 DBRDMT SUBQ TIS 1ST 20SQCM/<: CPT

## 2024-02-20 PROCEDURE — 11043 DBRDMT MUSC&/FSCA 1ST 20/<: CPT

## 2024-02-20 RX ORDER — GENTAMICIN SULFATE 1 MG/G
OINTMENT TOPICAL ONCE
OUTPATIENT
Start: 2024-02-20 | End: 2024-02-20

## 2024-02-20 RX ORDER — LIDOCAINE 40 MG/G
CREAM TOPICAL ONCE
OUTPATIENT
Start: 2024-02-20 | End: 2024-02-20

## 2024-02-20 RX ORDER — GINSENG 100 MG
CAPSULE ORAL ONCE
OUTPATIENT
Start: 2024-02-20 | End: 2024-02-20

## 2024-02-20 RX ORDER — LIDOCAINE HYDROCHLORIDE 40 MG/ML
SOLUTION TOPICAL ONCE
OUTPATIENT
Start: 2024-02-20 | End: 2024-02-20

## 2024-02-20 RX ORDER — TRIAMCINOLONE ACETONIDE 1 MG/G
OINTMENT TOPICAL ONCE
OUTPATIENT
Start: 2024-02-20 | End: 2024-02-20

## 2024-02-20 RX ORDER — BETAMETHASONE DIPROPIONATE 0.05 %
OINTMENT (GRAM) TOPICAL ONCE
OUTPATIENT
Start: 2024-02-20 | End: 2024-02-20

## 2024-02-20 RX ORDER — LIDOCAINE 50 MG/G
OINTMENT TOPICAL ONCE
OUTPATIENT
Start: 2024-02-20 | End: 2024-02-20

## 2024-02-20 RX ORDER — BACITRACIN ZINC AND POLYMYXIN B SULFATE 500; 1000 [USP'U]/G; [USP'U]/G
OINTMENT TOPICAL ONCE
OUTPATIENT
Start: 2024-02-20 | End: 2024-02-20

## 2024-02-20 RX ORDER — CLOBETASOL PROPIONATE 0.5 MG/G
OINTMENT TOPICAL ONCE
OUTPATIENT
Start: 2024-02-20 | End: 2024-02-20

## 2024-02-20 RX ORDER — IBUPROFEN 200 MG
TABLET ORAL ONCE
OUTPATIENT
Start: 2024-02-20 | End: 2024-02-20

## 2024-02-20 ASSESSMENT — PAIN DESCRIPTION - FREQUENCY: FREQUENCY: CONTINUOUS

## 2024-02-20 ASSESSMENT — PAIN DESCRIPTION - LOCATION: LOCATION: LEG

## 2024-02-20 ASSESSMENT — PAIN DESCRIPTION - PAIN TYPE: TYPE: CHRONIC PAIN

## 2024-02-20 ASSESSMENT — PAIN DESCRIPTION - ONSET: ONSET: ON-GOING

## 2024-02-20 ASSESSMENT — PAIN SCALES - GENERAL: PAINLEVEL_OUTOF10: 8

## 2024-02-20 ASSESSMENT — PAIN DESCRIPTION - ORIENTATION: ORIENTATION: RIGHT

## 2024-02-20 ASSESSMENT — PAIN DESCRIPTION - DESCRIPTORS: DESCRIPTORS: THROBBING

## 2024-02-20 ASSESSMENT — PAIN - FUNCTIONAL ASSESSMENT: PAIN_FUNCTIONAL_ASSESSMENT: PREVENTS OR INTERFERES SOME ACTIVE ACTIVITIES AND ADLS

## 2024-02-20 NOTE — PROGRESS NOTES
Wound Care Center Progress Note With Procedure    Julien Howard  AGE: 43 y.o.   GENDER: male  : 1980  EPISODE DATE:  2024     Subjective:       CHIEF COMPLAINT  WOUND   Chief Complaint   Patient presents with    Wound Check         HISTORY of PRESENT ILLNESS      Julien Howard is a 43 y.o. 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 The ulcer is of marked severity. The underlying cause of the wound is diabetes, non-healing surgical. He presents today with a new wound to the right plantar foot that is diabetic and pressure in nature and of moderate severity. The patient has significant underlying medical conditions as below. The patient is having significant depression related to the recent and sudden death of his mother.RAFAEL Howard CNP is PCP last seen 23.    Wound Pain Timing/Severity: none  Quality of pain: N/A  Severity of pain:  0 / 10   Modifying Factors: diabetes and chronic pressure  Associated Signs/Symptoms: drainage       24   Patient has wound on distal/anterior aspect of BKA stump.  Wound is down to muscle/fascia and getting near bone.  Surrounding tissue otherwise appears healthy.  This has been a chronic wound and while it has healed in the past due to the protruding underlying bone continues to recur.  Today we discussed possibility of revising the BKA stump.  He has enough length where this would be possible in and of muscle posteriorly to be able to close wound and hopefully have a good result.  Patient at this point states he would like to hold off on surgery.  He has a lot going on his life and at this point does not want to consider surgery.  He does state understanding of the risks of developing larger wound or having stump infected which then would prohibit the possibility of revision and may lead to above-knee amputation.

## 2024-02-20 NOTE — PATIENT INSTRUCTIONS
PHYSICIAN ORDERS AND DISCHARGE INSTRUCTIONS     NOTE: Upon discharge from the Wound Center, you will receive a patient experience survey. We would be grateful if you would take the time to fill this survey out.     Wound cleansing:              Do not scrub or use excessive force.              Wash hands with soap and water before and after dressing changes.              Prior to applying a clean dressing, cleanse wound with normal saline, wound cleanser, or mild soap and water.              Ask the physician or nurse before getting the wound(s) wet in a shower     Daily Wound management:              Keep weight off wounds and reposition every 2 hours.              Avoid standing for long periods of time.              Apply wraps/stockings in AM and remove at bedtime.              If swelling is present, elevate legs to the level of the heart or above for 30 minutes 4-5 times a day and/or when sitting.                                      When taking antibiotics take entire prescription as ordered by physician do not stop taking until medicine is all gone.                              Wound Care Notes:  Rx: [address] [facility]  Apply for grafts 05/11/22: aLL GRAFTS approved 06/08/22 for Left Toe amp site  Reapply for graft 08/22/22 for Left toes amp site   YADY's   Right 1.07      Left    1.1           Date: 08/15/22   Lives at [place] [facility]         Grafts Left Foot Amp Site  Puraply #1 Graft #1 4x4 fenestrated 06/15/22   Puraply #2 Graft #2 4x4 fenestrated 06/22/22                                    Orders for this week: 2/20/2024      FAX ORDERS TO [facility]!    A&D to left foot and leg    Right leg amp site - Wash with soap and water, pat dry.  Bolster with steristrips   Apply stimulen and sorbact contact layer to wound bed.  Apply qwick to periwound and secure with steristrips   Cover with tegaderm   Secure with medipore tape   Change 2 times per day.    Wheelchair Repair: [facility]

## 2024-02-29 ENCOUNTER — HOSPITAL ENCOUNTER (OUTPATIENT)
Dept: WOUND CARE | Age: 44
Discharge: HOME | End: 2024-02-29
Attending: NURSE PRACTITIONER
Payer: COMMERCIAL

## 2024-02-29 VITALS
RESPIRATION RATE: 20 BRPM | HEART RATE: 78 BPM | SYSTOLIC BLOOD PRESSURE: 130 MMHG | TEMPERATURE: 97.4 F | DIASTOLIC BLOOD PRESSURE: 82 MMHG

## 2024-02-29 DIAGNOSIS — Z89.511 S/P BKA (BELOW KNEE AMPUTATION), RIGHT (HCC): ICD-10-CM

## 2024-02-29 DIAGNOSIS — L03.116 CELLULITIS OF LEFT FOOT: Primary | ICD-10-CM

## 2024-02-29 DIAGNOSIS — L89.893 PRESSURE ULCER OF BKA STUMP, STAGE 3 (HCC): ICD-10-CM

## 2024-02-29 DIAGNOSIS — T87.89 PRESSURE ULCER OF BKA STUMP, STAGE 3 (HCC): ICD-10-CM

## 2024-02-29 PROCEDURE — 11042 DBRDMT SUBQ TIS 1ST 20SQCM/<: CPT

## 2024-02-29 PROCEDURE — 11042 DBRDMT SUBQ TIS 1ST 20SQCM/<: CPT | Performed by: NURSE PRACTITIONER

## 2024-02-29 RX ORDER — TRIAMCINOLONE ACETONIDE 1 MG/G
OINTMENT TOPICAL ONCE
OUTPATIENT
Start: 2024-02-29 | End: 2024-02-29

## 2024-02-29 RX ORDER — IBUPROFEN 200 MG
TABLET ORAL ONCE
OUTPATIENT
Start: 2024-02-29 | End: 2024-02-29

## 2024-02-29 RX ORDER — BETAMETHASONE DIPROPIONATE 0.05 %
OINTMENT (GRAM) TOPICAL ONCE
OUTPATIENT
Start: 2024-02-29 | End: 2024-02-29

## 2024-02-29 RX ORDER — LIDOCAINE HYDROCHLORIDE 40 MG/ML
SOLUTION TOPICAL ONCE
OUTPATIENT
Start: 2024-02-29 | End: 2024-02-29

## 2024-02-29 RX ORDER — BACITRACIN ZINC AND POLYMYXIN B SULFATE 500; 1000 [USP'U]/G; [USP'U]/G
OINTMENT TOPICAL ONCE
OUTPATIENT
Start: 2024-02-29 | End: 2024-02-29

## 2024-02-29 RX ORDER — LIDOCAINE 50 MG/G
OINTMENT TOPICAL ONCE
OUTPATIENT
Start: 2024-02-29 | End: 2024-02-29

## 2024-02-29 RX ORDER — LIDOCAINE 40 MG/G
CREAM TOPICAL ONCE
OUTPATIENT
Start: 2024-02-29 | End: 2024-02-29

## 2024-02-29 RX ORDER — GENTAMICIN SULFATE 1 MG/G
OINTMENT TOPICAL ONCE
OUTPATIENT
Start: 2024-02-29 | End: 2024-02-29

## 2024-02-29 RX ORDER — GINSENG 100 MG
CAPSULE ORAL ONCE
OUTPATIENT
Start: 2024-02-29 | End: 2024-02-29

## 2024-02-29 RX ORDER — CLOBETASOL PROPIONATE 0.5 MG/G
OINTMENT TOPICAL ONCE
OUTPATIENT
Start: 2024-02-29 | End: 2024-02-29

## 2024-02-29 ASSESSMENT — PAIN DESCRIPTION - PAIN TYPE: TYPE: CHRONIC PAIN

## 2024-02-29 ASSESSMENT — PAIN DESCRIPTION - LOCATION: LOCATION: KNEE

## 2024-02-29 ASSESSMENT — PAIN DESCRIPTION - ORIENTATION: ORIENTATION: RIGHT

## 2024-02-29 ASSESSMENT — PAIN DESCRIPTION - ONSET: ONSET: ON-GOING

## 2024-02-29 ASSESSMENT — PAIN SCALES - GENERAL: PAINLEVEL_OUTOF10: 5

## 2024-02-29 ASSESSMENT — PAIN DESCRIPTION - FREQUENCY: FREQUENCY: CONTINUOUS

## 2024-02-29 ASSESSMENT — PAIN DESCRIPTION - DESCRIPTORS: DESCRIPTORS: THROBBING

## 2024-02-29 ASSESSMENT — PAIN - FUNCTIONAL ASSESSMENT: PAIN_FUNCTIONAL_ASSESSMENT: PREVENTS OR INTERFERES SOME ACTIVE ACTIVITIES AND ADLS

## 2024-02-29 NOTE — PATIENT INSTRUCTIONS
PHYSICIAN ORDERS AND DISCHARGE INSTRUCTIONS     NOTE: Upon discharge from the Wound Center, you will receive a patient experience survey. We would be grateful if you would take the time to fill this survey out.     Wound cleansing:              Do not scrub or use excessive force.              Wash hands with soap and water before and after dressing changes.              Prior to applying a clean dressing, cleanse wound with normal saline, wound cleanser, or mild soap and water.              Ask the physician or nurse before getting the wound(s) wet in a shower     Daily Wound management:              Keep weight off wounds and reposition every 2 hours.              Avoid standing for long periods of time.              Apply wraps/stockings in AM and remove at bedtime.              If swelling is present, elevate legs to the level of the heart or above for 30 minutes 4-5 times a day and/or when sitting.                                      When taking antibiotics take entire prescription as ordered by physician do not stop taking until medicine is all gone.                              Wound Care Notes:  Rx: [address] [facility]  Apply for grafts 05/11/22: aLL GRAFTS approved 06/08/22 for Left Toe amp site  Reapply for graft 08/22/22 for Left toes amp site   YADY's   Right 1.07      Left    1.1           Date: 08/15/22   Lives at [place] [facility]         Grafts Left Foot Amp Site  Puraply #1 Graft #1 4x4 fenestrated 06/15/22   Puraply #2 Graft #2 4x4 fenestrated 06/22/22                                    Orders for this week: 2/29/2024      FAX ORDERS TO [facility]!    A&D to left foot and leg    Right leg amp site - Wash with soap and water, pat dry.  Apply Shwetha and bolster with Steri Strips  Apply regenacare and qwick to periwound and secure with steristrips   Cover with tegaderm   Secure with medipore tape   Change 2 times per day.    Wheelchair Repair: [facility]hab [facility] Rep Jose CASTILLO

## 2024-02-29 NOTE — PROGRESS NOTES
Wound Care Center Progress Note With Procedure    Julien Howard  AGE: 43 y.o.   GENDER: male  : 1980  EPISODE DATE:  2024     Subjective:       CHIEF COMPLAINT  WOUND   Chief Complaint   Patient presents with    Wound Check         HISTORY of PRESENT ILLNESS      Julien Howard is a 43 y.o. 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 The ulcer is of marked severity. The underlying cause of the wound is diabetes, non-healing surgical. He presents today with a new wound to the right plantar foot that is diabetic and pressure in nature and of moderate severity. The patient has significant underlying medical conditions as below. The patient is having significant depression related to the recent and sudden death of his mother.RAFAEL Howard CNP is PCP last seen 23.    Wound Pain Timing/Severity: none  Quality of pain: N/A  Severity of pain:  0 / 10   Modifying Factors: diabetes and chronic pressure  Associated Signs/Symptoms: drainage     Diabetes: Yes, on an oral and insulin regimen  Hemoglobin A1C   Date Value Ref Range Status   2023 11.8 See comment % Final     Comment:     Comment:  Diagnosis of Diabetes: > or = 6.5%  Increased risk of diabetes (Prediabetes): 5.7-6.4%  Glycemic Control: Nonpregnant Adults: <7.0%                    Pregnant: <6.0%          Diabetes education provided today:    Diabetes pathoetiology, difference between type 1 and type 2 diabetes, and progressive nature of Type 2 DM.  Diabetic Neuropathy: signs and therapy.  Foot care: advised to wash feet daily, pat dry and apply lotion at night, avoiding between toes. Need to look at feet daily and report to a physician any signs of inflammation or skin damage. Discussed diabetes shoes and socks.  Diabetic management related to wound care    Smoking: Never

## 2024-02-29 NOTE — PLAN OF CARE
Problem: Chronic Conditions and Co-morbidities  Goal: Patient's chronic conditions and co-morbidity symptoms are monitored and maintained or improved  Outcome: Progressing     Problem: Pain  Goal: Verbalizes/displays adequate comfort level or baseline comfort level  Outcome: Progressing     Problem: Pain  Goal: Verbalizes/displays adequate comfort level or baseline comfort level  Outcome: Progressing     Problem: Wound:  Goal: Will show signs of wound healing; wound closure and no evidence of infection  Description: Will show signs of wound healing; wound closure and no evidence of infection  Outcome: Progressing

## 2024-03-07 ENCOUNTER — HOSPITAL ENCOUNTER (OUTPATIENT)
Dept: WOUND CARE | Age: 44
Discharge: HOME | End: 2024-03-07
Attending: NURSE PRACTITIONER
Payer: COMMERCIAL

## 2024-03-07 VITALS
HEART RATE: 73 BPM | SYSTOLIC BLOOD PRESSURE: 122 MMHG | TEMPERATURE: 96.9 F | RESPIRATION RATE: 18 BRPM | DIASTOLIC BLOOD PRESSURE: 80 MMHG

## 2024-03-07 DIAGNOSIS — L89.893 PRESSURE ULCER OF BKA STUMP, STAGE 3 (HCC): Primary | ICD-10-CM

## 2024-03-07 DIAGNOSIS — Z79.4 TYPE 2 DIABETES MELLITUS WITH DIABETIC POLYNEUROPATHY, WITH LONG-TERM CURRENT USE OF INSULIN (HCC): ICD-10-CM

## 2024-03-07 DIAGNOSIS — E11.628 TYPE 2 DIABETES MELLITUS WITH RIGHT DIABETIC FOOT INFECTION (HCC): ICD-10-CM

## 2024-03-07 DIAGNOSIS — L08.9 TYPE 2 DIABETES MELLITUS WITH RIGHT DIABETIC FOOT INFECTION (HCC): ICD-10-CM

## 2024-03-07 DIAGNOSIS — T87.89 PRESSURE ULCER OF BKA STUMP, STAGE 3 (HCC): Primary | ICD-10-CM

## 2024-03-07 DIAGNOSIS — Z89.511 S/P BKA (BELOW KNEE AMPUTATION), RIGHT (HCC): ICD-10-CM

## 2024-03-07 DIAGNOSIS — E11.42 TYPE 2 DIABETES MELLITUS WITH DIABETIC POLYNEUROPATHY, WITH LONG-TERM CURRENT USE OF INSULIN (HCC): ICD-10-CM

## 2024-03-07 PROBLEM — L97.912 NON-PRESSURE ULCER OF STUMP OF BELOW KNEE AMPUTATION OF RIGHT LOWER EXTREMITY WITH FAT LAYER EXPOSED (HCC): Status: RESOLVED | Noted: 2022-10-20 | Resolved: 2024-03-07

## 2024-03-07 PROCEDURE — 11042 DBRDMT SUBQ TIS 1ST 20SQCM/<: CPT

## 2024-03-07 PROCEDURE — 87077 CULTURE AEROBIC IDENTIFY: CPT

## 2024-03-07 PROCEDURE — 87186 SC STD MICRODIL/AGAR DIL: CPT

## 2024-03-07 PROCEDURE — 87075 CULTR BACTERIA EXCEPT BLOOD: CPT

## 2024-03-07 PROCEDURE — 87070 CULTURE OTHR SPECIMN AEROBIC: CPT

## 2024-03-07 PROCEDURE — 11042 DBRDMT SUBQ TIS 1ST 20SQCM/<: CPT | Performed by: NURSE PRACTITIONER

## 2024-03-07 ASSESSMENT — PAIN SCALES - WONG BAKER: WONGBAKER_NUMERICALRESPONSE: 0

## 2024-03-07 ASSESSMENT — PAIN SCALES - GENERAL: PAINLEVEL_OUTOF10: 0

## 2024-03-07 NOTE — PATIENT INSTRUCTIONS
PHYSICIAN ORDERS AND DISCHARGE INSTRUCTIONS     NOTE: Upon discharge from the Wound Center, you will receive a patient experience survey. We would be grateful if you would take the time to fill this survey out.     Wound cleansing:              Do not scrub or use excessive force.              Wash hands with soap and water before and after dressing changes.              Prior to applying a clean dressing, cleanse wound with normal saline, wound cleanser, or mild soap and water.              Ask the physician or nurse before getting the wound(s) wet in a shower     Daily Wound management:              Keep weight off wounds and reposition every 2 hours.              Avoid standing for long periods of time.              Apply wraps/stockings in AM and remove at bedtime.              If swelling is present, elevate legs to the level of the heart or above for 30 minutes 4-5 times a day and/or when sitting.                                      When taking antibiotics take entire prescription as ordered by physician do not stop taking until medicine is all gone.                              Wound Care Notes:  Rx: [address] [facility]  Apply for grafts 05/11/22: aLL GRAFTS approved 06/08/22 for Left Toe amp site  Reapply for graft 08/22/22 for Left toes amp site   YADY's   Right 1.07      Left    1.1           Date: 08/15/22   Lives at [facility]         Grafts Left Foot Amp Site  Puraply #1 Graft #1 4x4 fenestrated 06/15/22   Puraply #2 Graft #2 4x4 fenestrated 06/22/22                                    Orders for this week: 3/7/2024      FAX ORDERS TO [facility]!    A&D to left foot and leg    Right leg amp site - Wash with soap and water, pat dry.  Base: Apply stimulen powder to wound bed. Bolster edges with steristrips. Cover with silver alginate and medium sorbex secured by conform (applied by teo)   Wrap with coban 2 lite   Leave in place until next visit to wound care center     Plan:

## 2024-03-07 NOTE — PROGRESS NOTES
Multilayer Compression Wrap   (Not Unna) Below the Knee    NAME:  Julien Howard  YOB: 1980  MEDICAL RECORD NUMBER:  [id number]  DATE:  3/7/2024    Multilayer compression wrap: Removed old Multilayer wrap if indicated and wash leg with mild soap/water.  Applied moisturizing agent to dry skin as needed.   Applied primary and secondary dressing as ordered.  Applied multilayered dressing below the knee to right lower leg.  Instructed patient/caregiver not to remove dressing and to keep it clean and dry.   Instructed patient/caregiver on complications to report to provider, such as pain, numbness in toes, heavy drainage, and slippage of dressing.  Instructed patient on purpose of compression dressing and on activity and exercise recommendations.      Electronically signed by Maria De Jesus Bledsoe LPN on 3/7/2024 at 10:55 AM  
the physician or nurse before getting the wound(s) wet in a shower     Daily Wound management:              Keep weight off wounds and reposition every 2 hours.              Avoid standing for long periods of time.              Apply wraps/stockings in AM and remove at bedtime.              If swelling is present, elevate legs to the level of the heart or above for 30 minutes 4-5 times a day and/or when sitting.                                      When taking antibiotics take entire prescription as ordered by physician do not stop taking until medicine is all gone.                              Wound Care Notes:  Rx: [address] [facility]  Apply for grafts 22: aLL GRAFTS approved 22 for Left Toe amp site  Reapply for graft 22 for Left toes amp site   YADY's   Right 1.07      Left    1.1           Date: 08/15/22   Lives at [place] [facility]         Grafts Left Foot Amp Site  Puraply #1 Graft #1 4x4 fenestrated 06/15/22   Puraply #2 Graft #2 4x4 fenestrated 22                                    Orders for this week: 3/7/2024      FAX ORDERS TO [facility]!    A&D to left foot and leg    Right leg amp site - Wash with soap and water, pat dry.  Base: Apply stimulen powder to wound bed. Bolster edges with steristrips. Cover with silver alginate and medium sorbex secured by conform (applied by teo)   Wrap with coban 2 lite   Leave in place until next visit to wound care center     Plan: Wheelchair Repair: [facility] Rep Jose CASTILLO [phone].   Culture taken 3/7/24     Dispense 30 day quantity when ordering supplies.     Nurse Visit   Follow Up Instructions: At the Wound Care Center:1 week  Primary Wound Care Provider: Teo Marinelli CNP   Call  for any questions or concerns.  Central [phone]       Electronically signed by RAFAEL Zambrano CNP on 3/7/2024 at 12:26 PM

## 2024-03-09 LAB
CULTURE: ABNORMAL
CULTURE: ABNORMAL
Lab: ABNORMAL
SPECIMEN: ABNORMAL

## 2024-03-12 LAB
CULTURE: ABNORMAL
CULTURE: ABNORMAL
Lab: ABNORMAL
SPECIMEN: ABNORMAL

## 2024-03-21 ENCOUNTER — HOSPITAL ENCOUNTER (OUTPATIENT)
Dept: WOUND CARE | Age: 44
Discharge: HOME | End: 2024-03-21
Attending: NURSE PRACTITIONER
Payer: COMMERCIAL

## 2024-03-21 VITALS
RESPIRATION RATE: 18 BRPM | DIASTOLIC BLOOD PRESSURE: 77 MMHG | TEMPERATURE: 97.7 F | HEART RATE: 77 BPM | SYSTOLIC BLOOD PRESSURE: 129 MMHG

## 2024-03-21 DIAGNOSIS — L03.116 CELLULITIS OF LEFT FOOT: Primary | ICD-10-CM

## 2024-03-21 DIAGNOSIS — Z89.432 PARTIAL NONTRAUMATIC AMPUTATION OF FOOT, LEFT (HCC): ICD-10-CM

## 2024-03-21 DIAGNOSIS — L97.422 DIABETIC ULCER OF LEFT MIDFOOT ASSOCIATED WITH TYPE 2 DIABETES MELLITUS, WITH FAT LAYER EXPOSED (HCC): ICD-10-CM

## 2024-03-21 DIAGNOSIS — L97.522 DIABETIC ULCER OF TOE OF LEFT FOOT ASSOCIATED WITH TYPE 2 DIABETES MELLITUS, WITH FAT LAYER EXPOSED (HCC): ICD-10-CM

## 2024-03-21 DIAGNOSIS — E11.621 DIABETIC ULCER OF LEFT MIDFOOT ASSOCIATED WITH TYPE 2 DIABETES MELLITUS, WITH FAT LAYER EXPOSED (HCC): ICD-10-CM

## 2024-03-21 DIAGNOSIS — E11.621 DIABETIC ULCER OF TOE OF LEFT FOOT ASSOCIATED WITH TYPE 2 DIABETES MELLITUS, WITH FAT LAYER EXPOSED (HCC): ICD-10-CM

## 2024-03-21 PROCEDURE — 11042 DBRDMT SUBQ TIS 1ST 20SQCM/<: CPT

## 2024-03-21 PROCEDURE — 11043 DBRDMT MUSC&/FSCA 1ST 20/<: CPT | Performed by: NURSE PRACTITIONER

## 2024-03-21 PROCEDURE — 11043 DBRDMT MUSC&/FSCA 1ST 20/<: CPT

## 2024-03-21 RX ORDER — GINSENG 100 MG
CAPSULE ORAL ONCE
OUTPATIENT
Start: 2024-03-21 | End: 2024-03-21

## 2024-03-21 RX ORDER — LIDOCAINE 40 MG/G
CREAM TOPICAL ONCE
OUTPATIENT
Start: 2024-03-21 | End: 2024-03-21

## 2024-03-21 RX ORDER — BACITRACIN ZINC AND POLYMYXIN B SULFATE 500; 1000 [USP'U]/G; [USP'U]/G
OINTMENT TOPICAL ONCE
OUTPATIENT
Start: 2024-03-21 | End: 2024-03-21

## 2024-03-21 RX ORDER — TRIAMCINOLONE ACETONIDE 1 MG/G
OINTMENT TOPICAL ONCE
OUTPATIENT
Start: 2024-03-21 | End: 2024-03-21

## 2024-03-21 RX ORDER — LIDOCAINE HYDROCHLORIDE 40 MG/ML
SOLUTION TOPICAL ONCE
OUTPATIENT
Start: 2024-03-21 | End: 2024-03-21

## 2024-03-21 RX ORDER — BETAMETHASONE DIPROPIONATE 0.05 %
OINTMENT (GRAM) TOPICAL ONCE
OUTPATIENT
Start: 2024-03-21 | End: 2024-03-21

## 2024-03-21 RX ORDER — CLOBETASOL PROPIONATE 0.5 MG/G
OINTMENT TOPICAL ONCE
OUTPATIENT
Start: 2024-03-21 | End: 2024-03-21

## 2024-03-21 RX ORDER — LIDOCAINE 50 MG/G
OINTMENT TOPICAL ONCE
OUTPATIENT
Start: 2024-03-21 | End: 2024-03-21

## 2024-03-21 RX ORDER — IBUPROFEN 200 MG
TABLET ORAL ONCE
OUTPATIENT
Start: 2024-03-21 | End: 2024-03-21

## 2024-03-21 RX ORDER — GENTAMICIN SULFATE 1 MG/G
OINTMENT TOPICAL ONCE
OUTPATIENT
Start: 2024-03-21 | End: 2024-03-21

## 2024-03-21 NOTE — PATIENT INSTRUCTIONS
PHYSICIAN ORDERS AND DISCHARGE INSTRUCTIONS     NOTE: Upon discharge from the Wound Center, you will receive a patient experience survey. We would be grateful if you would take the time to fill this survey out.     Wound cleansing:              Do not scrub or use excessive force.              Wash hands with soap and water before and after dressing changes.              Prior to applying a clean dressing, cleanse wound with normal saline, wound cleanser, or mild soap and water.              Ask the physician or nurse before getting the wound(s) wet in a shower     Wound Care Notes:  Rx: [address] [facility]  Apply for grafts 22: aLL GRAFTS approved 22 for Left Toe amp site  Reapply for graft 22 for Left toes amp site   YADY's   Right 1.07      Left    1.1           Date: 08/15/22  Lives at [place] [facility]      Grafts Left Foot Amp Site  Puraply #1 Graft #1 4x4 fenestrated 06/15/22   Puraply #2 Graft #2 4x4 fenestrated 22                                    Orders for this week: 3/21/2024      FAX ORDERS TO [facility]!    A&D to left foot and leg    Right leg amp site - Wash with soap and water, pat dry.  Base: Apply (colactive ag powder when available) puracol  Ag to wound bed. Bolster edges with steristrips. (applied by mallory)   Cover with ABD, kerlix  applied by mallory)   Wrap with Ace   At home Change twice per week     Dispense 30 day quantity when ordering supplies.  Plan: Wheelchair Repair: [facility] Rep Jose CASTILLO [phone].  Supply request faxed to [facility] 3/21/24       Nurse Visit   Follow Up Instructions: At the Wound Care Center:1 week  Primary Wound Care Provider: Mallory Marinelli CNP   Call  for any questions or concerns.  Central [phone]

## 2024-03-21 NOTE — PROGRESS NOTES
experience survey. We would be grateful if you would take the time to fill this survey out.     Wound cleansing:              Do not scrub or use excessive force.              Wash hands with soap and water before and after dressing changes.              Prior to applying a clean dressing, cleanse wound with normal saline, wound cleanser, or mild soap and water.              Ask the physician or nurse before getting the wound(s) wet in a shower     Wound Care Notes:  Rx: [address] [facility]  Apply for grafts 22: aLL GRAFTS approved 22 for Left Toe amp site  Reapply for graft 22 for Left toes amp site   YADY's   Right 1.07      Left    1.1           Date: 08/15/22  Lives at [place] [facility]      Grafts Left Foot Amp Site  Puraply #1 Graft #1 4x4 fenestrated 06/15/22   Puraply #2 Graft #2 4x4 fenestrated 22                                    Orders for this week: 3/21/2024      FAX ORDERS TO [facility]!    A&D to left foot and leg    Right leg amp site - Wash with soap and water, pat dry.  Base: Apply (colactive ag powder when available) puracol  Ag to wound bed. Bolster edges with steristrips. (applied by teo)   Cover with ABD, kerlix  applied by teo)   Wrap with Ace   At home Change twice per week     Dispense 30 day quantity when ordering supplies.  Plan: Wheelchair Repair: [facility]hab [facility] Rep Jose SAENZ. [phone].       Nurse Visit   Follow Up Instructions: At the Wound Care Center:1 week  Primary Wound Care Provider: Teo Marinelli CNP   Call  for any questions or concerns.  Central [phone]       Electronically signed by RAFAEL Zambrano CNP on 3/21/2024 at 11:57 AM

## 2024-04-04 ENCOUNTER — HOSPITAL ENCOUNTER (OUTPATIENT)
Dept: WOUND CARE | Age: 44
Discharge: HOME | End: 2024-04-04
Attending: NURSE PRACTITIONER

## 2024-04-04 NOTE — PATIENT INSTRUCTIONS
PHYSICIAN ORDERS AND DISCHARGE INSTRUCTIONS     NOTE: Upon discharge from the Wound Center, you will receive a patient experience survey. We would be grateful if you would take the time to fill this survey out.     Wound cleansing:              Do not scrub or use excessive force.              Wash hands with soap and water before and after dressing changes.              Prior to applying a clean dressing, cleanse wound with normal saline, wound cleanser, or mild soap and water.              Ask the physician or nurse before getting the wound(s) wet in a shower     Wound Care Notes:  Rx: [address] [facility]  Apply for grafts 22: aLL GRAFTS approved 22 for Left Toe amp site  Reapply for graft 22 for Left toes amp site   YADY's   Right 1.07      Left    1.1           Date: 08/15/22  Lives at [place] [facility]      Grafts Left Foot Amp Site  Puraply #1 Graft #1 4x4 fenestrated 06/15/22   Puraply #2 Graft #2 4x4 fenestrated 22                                    Orders for this week: 2024      FAX ORDERS TO [facility]!    A&D to left foot and leg    Right leg amp site - Wash with soap and water, pat dry.  Base: Apply (colactive ag powder when available) puracol  Ag to wound bed. Bolster edges with steristrips. (applied by mallory)   Cover with ABD, kerlix  applied by mallory)   Wrap with Ace   At home Change twice per week     Dispense 30 day quantity when ordering supplies.  Plan: Wheelchair Repair: [facility] Rep Jose CASTILLO [phone].  Supply request faxed to [facility] 3/21/24       Nurse Visit   Follow Up Instructions: At the Wound Care Center:1 week  Primary Wound Care Provider: Mallory Marinelli CNP   Call  for any questions or concerns.  Central [phone]

## 2024-05-02 ENCOUNTER — HOSPITAL ENCOUNTER (OUTPATIENT)
Dept: WOUND CARE | Age: 44
Discharge: HOME | End: 2024-05-02
Attending: NURSE PRACTITIONER
Payer: COMMERCIAL

## 2024-05-02 VITALS
TEMPERATURE: 98.4 F | HEART RATE: 80 BPM | DIASTOLIC BLOOD PRESSURE: 84 MMHG | RESPIRATION RATE: 16 BRPM | SYSTOLIC BLOOD PRESSURE: 126 MMHG

## 2024-05-02 DIAGNOSIS — Z89.432 PARTIAL NONTRAUMATIC AMPUTATION OF FOOT, LEFT (HCC): Primary | ICD-10-CM

## 2024-05-02 DIAGNOSIS — F41.8 DEPRESSION WITH ANXIETY: ICD-10-CM

## 2024-05-02 DIAGNOSIS — Z79.4 TYPE 2 DIABETES MELLITUS WITH DIABETIC POLYNEUROPATHY, WITH LONG-TERM CURRENT USE OF INSULIN (HCC): ICD-10-CM

## 2024-05-02 DIAGNOSIS — T87.89 PRESSURE ULCER OF BKA STUMP, STAGE 3 (HCC): ICD-10-CM

## 2024-05-02 DIAGNOSIS — Z89.511 S/P BKA (BELOW KNEE AMPUTATION), RIGHT (HCC): ICD-10-CM

## 2024-05-02 DIAGNOSIS — E11.42 TYPE 2 DIABETES MELLITUS WITH DIABETIC POLYNEUROPATHY, WITH LONG-TERM CURRENT USE OF INSULIN (HCC): ICD-10-CM

## 2024-05-02 DIAGNOSIS — L89.893 PRESSURE ULCER OF BKA STUMP, STAGE 3 (HCC): ICD-10-CM

## 2024-05-02 PROCEDURE — 11043 DBRDMT MUSC&/FSCA 1ST 20/<: CPT

## 2024-05-02 PROCEDURE — 11043 DBRDMT MUSC&/FSCA 1ST 20/<: CPT | Performed by: NURSE PRACTITIONER

## 2024-05-02 PROCEDURE — 11042 DBRDMT SUBQ TIS 1ST 20SQCM/<: CPT

## 2024-05-02 RX ORDER — IBUPROFEN 200 MG
TABLET ORAL ONCE
OUTPATIENT
Start: 2024-05-02 | End: 2024-05-02

## 2024-05-02 RX ORDER — LIDOCAINE 50 MG/G
OINTMENT TOPICAL ONCE
OUTPATIENT
Start: 2024-05-02 | End: 2024-05-02

## 2024-05-02 RX ORDER — LIDOCAINE HYDROCHLORIDE 40 MG/ML
SOLUTION TOPICAL ONCE
OUTPATIENT
Start: 2024-05-02 | End: 2024-05-02

## 2024-05-02 RX ORDER — GENTAMICIN SULFATE 1 MG/G
OINTMENT TOPICAL ONCE
OUTPATIENT
Start: 2024-05-02 | End: 2024-05-02

## 2024-05-02 RX ORDER — TRIAMCINOLONE ACETONIDE 1 MG/G
OINTMENT TOPICAL ONCE
OUTPATIENT
Start: 2024-05-02 | End: 2024-05-02

## 2024-05-02 RX ORDER — BACITRACIN ZINC AND POLYMYXIN B SULFATE 500; 1000 [USP'U]/G; [USP'U]/G
OINTMENT TOPICAL ONCE
OUTPATIENT
Start: 2024-05-02 | End: 2024-05-02

## 2024-05-02 RX ORDER — BETAMETHASONE DIPROPIONATE 0.05 %
OINTMENT (GRAM) TOPICAL ONCE
OUTPATIENT
Start: 2024-05-02 | End: 2024-05-02

## 2024-05-02 RX ORDER — GINSENG 100 MG
CAPSULE ORAL ONCE
OUTPATIENT
Start: 2024-05-02 | End: 2024-05-02

## 2024-05-02 RX ORDER — CLOBETASOL PROPIONATE 0.5 MG/G
OINTMENT TOPICAL ONCE
OUTPATIENT
Start: 2024-05-02 | End: 2024-05-02

## 2024-05-02 RX ORDER — LIDOCAINE 40 MG/G
CREAM TOPICAL ONCE
OUTPATIENT
Start: 2024-05-02 | End: 2024-05-02

## 2024-05-02 ASSESSMENT — PAIN DESCRIPTION - LOCATION: LOCATION: KNEE

## 2024-05-02 ASSESSMENT — PAIN - FUNCTIONAL ASSESSMENT: PAIN_FUNCTIONAL_ASSESSMENT: ACTIVITIES ARE NOT PREVENTED

## 2024-05-02 ASSESSMENT — PAIN SCALES - GENERAL: PAINLEVEL_OUTOF10: 0

## 2024-05-02 ASSESSMENT — PAIN DESCRIPTION - PAIN TYPE: TYPE: CHRONIC PAIN

## 2024-05-02 ASSESSMENT — PAIN DESCRIPTION - ORIENTATION: ORIENTATION: RIGHT

## 2024-05-02 NOTE — PATIENT INSTRUCTIONS
PHYSICIAN ORDERS AND DISCHARGE INSTRUCTIONS     NOTE: Upon discharge from the Wound Center, you will receive a patient experience survey. We would be grateful if you would take the time to fill this survey out.     Wound cleansing:              Do not scrub or use excessive force.              Wash hands with soap and water before and after dressing changes.              Prior to applying a clean dressing, cleanse wound with normal saline, wound cleanser, or mild soap and water.              Ask the physician or nurse before getting the wound(s) wet in a shower     Wound Care Notes:  Rx: [address] [facility]  Apply for grafts 22: aLL GRAFTS approved 22 for Left Toe amp site  Reapply for graft 22 for Left toes amp site   YADY's   Right 1.07      Left    1.1           Date: 08/15/22  Lives at [place] [facility]      Grafts Left Foot Amp Site  Puraply #1 Graft #1 4x4 fenestrated 06/15/22   Puraply #2 Graft #2 4x4 fenestrated 22                                    Orders for this week: 2024      FAX ORDERS TO [facility]!    Right leg amp site - Wash with soap and water, pat dry.  Apply (colactive ag powder when available) puracol  Ag to wound bed. Bolster edges with steristrips.   Cover with ABD, kerlix    Wrap with Ace   At home Change twice per week     Dispense 30 day quantity when ordering supplies.  Plan: Wheelchair Repair: Rehab Medical Rep Jose SAENZ. [phone].  Supply request faxed to [facility] 3/21/24  Pt reports Surgery at [place] possible       Nurse Visit   Follow Up Instructions: At the Wound Care Center:1 week: Thursday   Primary Wound Care Provider: Mallory Marinelli CNP   Call  for any questions or concerns.  Central [phone]

## 2024-05-03 ENCOUNTER — TELEPHONE (OUTPATIENT)
Dept: WOUND CARE | Age: 44
End: 2024-05-03

## 2024-05-03 PROBLEM — Z47.81 ENCOUNTER FOR ORTHOPEDIC AFTERCARE FOLLOWING SURGICAL AMPUTATION: Status: RESOLVED | Noted: 2022-09-20 | Resolved: 2024-05-03

## 2024-05-03 NOTE — PROGRESS NOTES
cream to protect the skin.        PAST MEDICAL HISTORY        Diagnosis Date    Abscess of left foot 4/22/2022    Anemia associated with acute blood loss 4/26/2022    Callus of foot     Right foot - see's Dr. Gutierrez    Cellulitis of left foot 4/22/2022    Diabetic ulcer of left midfoot associated with type 2 diabetes mellitus, with muscle involvement without evidence of necrosis (HCC) 4/26/2022    Diabetic ulcer of left midfoot associated with type 2 diabetes mellitus, with necrosis of muscle (HCC) 6/7/2021    Diabetic ulcer of right midfoot associated with type 2 diabetes mellitus, with fat layer exposed (HCC) 8/11/2020    Dizziness     positional    Essential hypertension     Follows with PCP    Hyperlipidemia     Ketosis ([facility]) 1/10/2023    Lumbar radiculopathy     Septic embolism ([facility]) 6/13/2020    Subacute osteomyelitis of left foot ([facility]) 4/22/2022    Type 2 diabetes mellitus, with long-term current use of insulin (HCC)        PAST SURGICAL HISTORY    Past Surgical History:   Procedure Laterality Date    ACHILLES TENDON SURGERY Right 1/8/2021    RIGHT ACHILLES TENDON LENGTHENING REPAIR performed by James Gutierrez DPM at [facility] OR    CARDIAC CATHETERIZATION  03/2018    [facility]    DENTAL SURGERY      teeth extractions -half per patient    HERNIA REPAIR Bilateral 5/27/2020    BIALTERAL HERNIA INGUINAL REPAIR performed by Steve Barajas II, MD at [facility] OR    LEG AMPUTATION BELOW KNEE Right 9/2/2022    LEG AMPUTATION BELOW KNEE performed by Carol Minor MD at [facility] OR    LEG AMPUTATION BELOW KNEE Right 9/5/2022    LEG AMPUTATION BELOW KNEE REVISION performed by Carol Minor MD at [facility] OR    LUMBAR LAMINECTOMY  2018    FL OFFICE/OUTPT VISIT,PROCEDURE ONLY Left 4/17/2018    L5-S1 HEMILAMINECTOMY, REMOVAL OF DISC LEFT SIDE performed by William Escudero MD at [facility] OR    TOE AMPUTATION Right 1/8/2021    RIGHT TRANSMETATARSAL TOE AMPUTATION performed by James Gutierrez DPM at [facility] OR    TOE

## 2024-05-03 NOTE — TELEPHONE ENCOUNTER
Pt called stating he is ready for surgery on his right leg that was discussed at last ov.  I informed him you were in [place] today, so he may contact you

## 2024-05-04 NOTE — CARE COORDINATION
[facility] Transitions Initial Follow Up Call    Call within 2 business days of discharge: Yes    Patient: Gita Jiang Patient : 1980   MRN: [id number]  Reason for Admission: CP w/ ACS ruled out  Discharge Date: 3/13/21 RARS: Readmission Risk Score: 13  Facility: [address] [place] [address] [employer]       Complaint Diagnosis Description Type Department Provider    3/12/21 Chest Pain; Shortness of Breath Chest pain, unspecified type . .. ED to Hosp-Admission (Discharged) (Erich Ruggiero) Adama Hawkins MD; Juan Leija. ..      . COVID19 RISK MONITORING  COVID19 SCREEN: 3/12/21 Negative  PATIENT RISK FACTORS: DM  RARS: 13  CONSTANZA PCP: En Cage    1st attempt to reach Patient for discharge call unsuccessful. No answer, no vm options.       Carrie Palomo RN
None/Contact precautions.../Airborne+Contact precautions

## 2024-05-06 ENCOUNTER — TELEPHONE (OUTPATIENT)
Dept: WOUND CARE | Age: 44
End: 2024-05-06

## 2024-05-07 ENCOUNTER — HOSPITAL ENCOUNTER (OUTPATIENT)
Dept: WOUND CARE | Age: 44
Discharge: HOME | End: 2024-05-07
Attending: NURSE PRACTITIONER
Payer: COMMERCIAL

## 2024-05-07 VITALS
HEART RATE: 73 BPM | SYSTOLIC BLOOD PRESSURE: 146 MMHG | TEMPERATURE: 97.3 F | RESPIRATION RATE: 16 BRPM | DIASTOLIC BLOOD PRESSURE: 83 MMHG

## 2024-05-07 DIAGNOSIS — E11.621 DIABETIC ULCER OF TOE OF LEFT FOOT ASSOCIATED WITH TYPE 2 DIABETES MELLITUS, WITH FAT LAYER EXPOSED (HCC): ICD-10-CM

## 2024-05-07 DIAGNOSIS — L03.116 CELLULITIS OF LEFT FOOT: Primary | ICD-10-CM

## 2024-05-07 DIAGNOSIS — L97.422 DIABETIC ULCER OF LEFT MIDFOOT ASSOCIATED WITH TYPE 2 DIABETES MELLITUS, WITH FAT LAYER EXPOSED (HCC): ICD-10-CM

## 2024-05-07 DIAGNOSIS — E11.621 DIABETIC ULCER OF LEFT MIDFOOT ASSOCIATED WITH TYPE 2 DIABETES MELLITUS, WITH FAT LAYER EXPOSED (HCC): ICD-10-CM

## 2024-05-07 DIAGNOSIS — Z89.432 PARTIAL NONTRAUMATIC AMPUTATION OF FOOT, LEFT (HCC): ICD-10-CM

## 2024-05-07 DIAGNOSIS — L97.522 DIABETIC ULCER OF TOE OF LEFT FOOT ASSOCIATED WITH TYPE 2 DIABETES MELLITUS, WITH FAT LAYER EXPOSED (HCC): ICD-10-CM

## 2024-05-07 PROCEDURE — 99213 OFFICE O/P EST LOW 20 MIN: CPT

## 2024-05-07 RX ORDER — BACITRACIN ZINC AND POLYMYXIN B SULFATE 500; 1000 [USP'U]/G; [USP'U]/G
OINTMENT TOPICAL ONCE
OUTPATIENT
Start: 2024-05-07 | End: 2024-05-07

## 2024-05-07 RX ORDER — LIDOCAINE HYDROCHLORIDE 40 MG/ML
SOLUTION TOPICAL ONCE
OUTPATIENT
Start: 2024-05-07 | End: 2024-05-07

## 2024-05-07 RX ORDER — LIDOCAINE 40 MG/G
CREAM TOPICAL ONCE
OUTPATIENT
Start: 2024-05-07 | End: 2024-05-07

## 2024-05-07 RX ORDER — IBUPROFEN 200 MG
TABLET ORAL ONCE
OUTPATIENT
Start: 2024-05-07 | End: 2024-05-07

## 2024-05-07 RX ORDER — LIDOCAINE 50 MG/G
OINTMENT TOPICAL ONCE
OUTPATIENT
Start: 2024-05-07 | End: 2024-05-07

## 2024-05-07 RX ORDER — GINSENG 100 MG
CAPSULE ORAL ONCE
OUTPATIENT
Start: 2024-05-07 | End: 2024-05-07

## 2024-05-07 RX ORDER — BETAMETHASONE DIPROPIONATE 0.05 %
OINTMENT (GRAM) TOPICAL ONCE
OUTPATIENT
Start: 2024-05-07 | End: 2024-05-07

## 2024-05-07 RX ORDER — CLOBETASOL PROPIONATE 0.5 MG/G
OINTMENT TOPICAL ONCE
OUTPATIENT
Start: 2024-05-07 | End: 2024-05-07

## 2024-05-07 RX ORDER — GENTAMICIN SULFATE 1 MG/G
OINTMENT TOPICAL ONCE
OUTPATIENT
Start: 2024-05-07 | End: 2024-05-07

## 2024-05-07 RX ORDER — TRIAMCINOLONE ACETONIDE 1 MG/G
OINTMENT TOPICAL ONCE
OUTPATIENT
Start: 2024-05-07 | End: 2024-05-07

## 2024-05-07 ASSESSMENT — PAIN - FUNCTIONAL ASSESSMENT: PAIN_FUNCTIONAL_ASSESSMENT: ACTIVITIES ARE NOT PREVENTED

## 2024-05-07 ASSESSMENT — PAIN DESCRIPTION - LOCATION: LOCATION: KNEE

## 2024-05-07 ASSESSMENT — PAIN DESCRIPTION - ORIENTATION: ORIENTATION: RIGHT

## 2024-05-07 ASSESSMENT — PAIN SCALES - GENERAL: PAINLEVEL_OUTOF10: 0

## 2024-05-07 ASSESSMENT — PAIN DESCRIPTION - PAIN TYPE: TYPE: CHRONIC PAIN

## 2024-05-07 NOTE — PATIENT INSTRUCTIONS
PHYSICIAN ORDERS AND DISCHARGE INSTRUCTIONS     NOTE: Upon discharge from the Wound Center, you will receive a patient experience survey. We would be grateful if you would take the time to fill this survey out.     Wound cleansing:              Do not scrub or use excessive force.              Wash hands with soap and water before and after dressing changes.              Prior to applying a clean dressing, cleanse wound with normal saline, wound cleanser, or mild soap and water.              Ask the physician or nurse before getting the wound(s) wet in a shower     Wound Care Notes:  Rx: [address] [facility]  Apply for grafts 22: aLL GRAFTS approved 22 for Left Toe amp site  Reapply for graft 22 for Left toes amp site   YADY's   Right 1.07      Left    1.1           Date: 08/15/22  Lives at [facility]      Grafts Left Foot Amp Site  Puraply #1 Graft #1 4x4 fenestrated 06/15/22   Puraply #2 Graft #2 4x4 fenestrated 22                                    Orders for this week: 2024      FAX ORDERS TO [facility]!    Discussion of right BKA revision plan 24    Right leg amp site - Wash with soap and water, pat dry.  Apply (colactive ag powder when available) puracol Ag to wound bed. Bolster edges with steristrips.   Cover with ABD, kerlix    Wrap with Ace.  At home Change twice per week     Dispense 30 day quantity when ordering supplies.  Plan: Wheelchair Repair: Rehab Medical Rep Jose SAENZ. [phone].  Supply request faxed to [facility] 3/21/24  Pt reports Surgery at [place] possible       Nurse Visit   Follow Up Instructions: At the Wound Care Center:1 week: Tuesday   Primary Wound Care Provider: Mallory Marinelli CNP  Call  for any questions or concerns.  Central [phone]

## 2024-05-14 ENCOUNTER — HOSPITAL ENCOUNTER (OUTPATIENT)
Dept: WOUND CARE | Age: 44
Discharge: HOME | End: 2024-05-14
Attending: NURSE PRACTITIONER
Payer: COMMERCIAL

## 2024-05-14 VITALS
DIASTOLIC BLOOD PRESSURE: 90 MMHG | SYSTOLIC BLOOD PRESSURE: 136 MMHG | TEMPERATURE: 97.9 F | RESPIRATION RATE: 16 BRPM | HEART RATE: 77 BPM

## 2024-05-14 DIAGNOSIS — Z89.412: ICD-10-CM

## 2024-05-14 DIAGNOSIS — Z79.4 TYPE 2 DIABETES MELLITUS WITH DIABETIC POLYNEUROPATHY, WITH LONG-TERM CURRENT USE OF INSULIN (HCC): ICD-10-CM

## 2024-05-14 DIAGNOSIS — E11.42 TYPE 2 DIABETES MELLITUS WITH DIABETIC POLYNEUROPATHY, WITH LONG-TERM CURRENT USE OF INSULIN (HCC): ICD-10-CM

## 2024-05-14 DIAGNOSIS — L89.893 PRESSURE ULCER OF BKA STUMP, STAGE 3 (HCC): Primary | ICD-10-CM

## 2024-05-14 DIAGNOSIS — Z89.511 S/P BKA (BELOW KNEE AMPUTATION), RIGHT (HCC): ICD-10-CM

## 2024-05-14 DIAGNOSIS — T87.89 PRESSURE ULCER OF BKA STUMP, STAGE 3 (HCC): Primary | ICD-10-CM

## 2024-05-14 PROCEDURE — 11042 DBRDMT SUBQ TIS 1ST 20SQCM/<: CPT

## 2024-05-14 PROCEDURE — 11042 DBRDMT SUBQ TIS 1ST 20SQCM/<: CPT | Performed by: NURSE PRACTITIONER

## 2024-05-14 RX ORDER — BETAMETHASONE DIPROPIONATE 0.05 %
OINTMENT (GRAM) TOPICAL ONCE
Status: CANCELLED | OUTPATIENT
Start: 2024-05-14 | End: 2024-05-14

## 2024-05-14 RX ORDER — GENTAMICIN SULFATE 1 MG/G
OINTMENT TOPICAL ONCE
Status: CANCELLED | OUTPATIENT
Start: 2024-05-14 | End: 2024-05-14

## 2024-05-14 RX ORDER — CLOBETASOL PROPIONATE 0.5 MG/G
OINTMENT TOPICAL ONCE
Status: CANCELLED | OUTPATIENT
Start: 2024-05-14 | End: 2024-05-14

## 2024-05-14 RX ORDER — LIDOCAINE 40 MG/G
CREAM TOPICAL ONCE
Status: CANCELLED | OUTPATIENT
Start: 2024-05-14 | End: 2024-05-14

## 2024-05-14 RX ORDER — IBUPROFEN 200 MG
TABLET ORAL ONCE
Status: CANCELLED | OUTPATIENT
Start: 2024-05-14 | End: 2024-05-14

## 2024-05-14 RX ORDER — LIDOCAINE HYDROCHLORIDE 40 MG/ML
SOLUTION TOPICAL ONCE
Status: CANCELLED | OUTPATIENT
Start: 2024-05-14 | End: 2024-05-14

## 2024-05-14 RX ORDER — TRIAMCINOLONE ACETONIDE 1 MG/G
OINTMENT TOPICAL ONCE
Status: CANCELLED | OUTPATIENT
Start: 2024-05-14 | End: 2024-05-14

## 2024-05-14 RX ORDER — LIDOCAINE 50 MG/G
OINTMENT TOPICAL ONCE
Status: CANCELLED | OUTPATIENT
Start: 2024-05-14 | End: 2024-05-14

## 2024-05-14 RX ORDER — BACITRACIN ZINC AND POLYMYXIN B SULFATE 500; 1000 [USP'U]/G; [USP'U]/G
OINTMENT TOPICAL ONCE
Status: CANCELLED | OUTPATIENT
Start: 2024-05-14 | End: 2024-05-14

## 2024-05-14 RX ORDER — GINSENG 100 MG
CAPSULE ORAL ONCE
Status: CANCELLED | OUTPATIENT
Start: 2024-05-14 | End: 2024-05-14

## 2024-05-14 NOTE — PATIENT INSTRUCTIONS
PHYSICIAN ORDERS AND DISCHARGE INSTRUCTIONS     NOTE: Upon discharge from the Wound Center, you will receive a patient experience survey. We would be grateful if you would take the time to fill this survey out.     Wound cleansing:              Do not scrub or use excessive force.              Wash hands with soap and water before and after dressing changes.              Prior to applying a clean dressing, cleanse wound with normal saline, wound cleanser, or mild soap and water.              Ask the physician or nurse before getting the wound(s) wet in a shower     Wound Care Notes:  Rx: [address] [facility]  Apply for grafts 22: aLL GRAFTS approved 22 for Left Toe amp site  Reapply for graft 22 for Left toes amp site   YADY's   Right 1.07      Left    1.1           Date: 08/15/22  Lives at [place] assisted [facility]      Grafts Left Foot Amp Site  Puraply #1 Graft #1 4x4 fenestrated 06/15/22   Puraply #2 Graft #2 4x4 fenestrated 22                                    Orders for this week: 2024      FAX ORDERS TO [facility]!    Discussion of right BKA revision plan 24    Right leg amp site - Wash with soap and water, pat dry.  Apply (colactive ag powder when available) otherwise Stimulen to wound bed. Bolster edges with steristrips.   Cover with Agile and silicone island border.  Wrap with Ace.  At home Change twice per week     Dispense 30 day quantity when ordering supplies.  Plan: Wheelchair Repair: Rehab Medical Rep Jose SAENZ. [phone].  Supply request faxed to [facility] 3/21/24  Pt reports Surgery at [place] possible       Nurse Visit   Follow Up Instructions: At the Wound Care Center 1 week: Tuesday   Primary Wound Care Provider: Mallory Marinelli CNP  Call  for any questions or concerns.  Central [phone]

## 2024-05-15 PROBLEM — S88.119A BELOW-KNEE AMPUTATION (HCC): Status: RESOLVED | Noted: 2022-09-20 | Resolved: 2024-05-15

## 2024-05-15 PROBLEM — Z89.412: Status: ACTIVE | Noted: 2024-05-15

## 2024-05-15 RX ORDER — LIDOCAINE HYDROCHLORIDE 40 MG/ML
SOLUTION TOPICAL ONCE
OUTPATIENT
Start: 2024-05-15 | End: 2024-05-15

## 2024-05-15 RX ORDER — BACITRACIN ZINC 500 [USP'U]/G
OINTMENT TOPICAL ONCE
OUTPATIENT
Start: 2024-05-15 | End: 2024-05-15

## 2024-05-15 RX ORDER — IBUPROFEN 200 MG
TABLET ORAL ONCE
OUTPATIENT
Start: 2024-05-15 | End: 2024-05-15

## 2024-05-15 RX ORDER — CLOBETASOL PROPIONATE 0.5 MG/G
OINTMENT TOPICAL ONCE
OUTPATIENT
Start: 2024-05-15 | End: 2024-05-15

## 2024-05-15 RX ORDER — LIDOCAINE 50 MG/G
OINTMENT TOPICAL ONCE
OUTPATIENT
Start: 2024-05-15 | End: 2024-05-15

## 2024-05-15 RX ORDER — LIDOCAINE 40 MG/G
CREAM TOPICAL ONCE
OUTPATIENT
Start: 2024-05-15 | End: 2024-05-15

## 2024-05-15 RX ORDER — GENTAMICIN SULFATE 1 MG/G
OINTMENT TOPICAL ONCE
OUTPATIENT
Start: 2024-05-15 | End: 2024-05-15

## 2024-05-15 RX ORDER — SODIUM CHLOR/HYPOCHLOROUS ACID 0.033 %
SOLUTION, IRRIGATION IRRIGATION ONCE
OUTPATIENT
Start: 2024-05-15 | End: 2024-05-15

## 2024-05-15 RX ORDER — TRIAMCINOLONE ACETONIDE 1 MG/G
OINTMENT TOPICAL ONCE
OUTPATIENT
Start: 2024-05-15 | End: 2024-05-15

## 2024-05-15 RX ORDER — BACITRACIN ZINC AND POLYMYXIN B SULFATE 500; 1000 [USP'U]/G; [USP'U]/G
OINTMENT TOPICAL ONCE
OUTPATIENT
Start: 2024-05-15 | End: 2024-05-15

## 2024-05-15 RX ORDER — BETAMETHASONE DIPROPIONATE 0.5 MG/G
CREAM TOPICAL ONCE
OUTPATIENT
Start: 2024-05-15 | End: 2024-05-15

## 2024-05-15 NOTE — PROGRESS NOTES
toe (HCC)     Procedure Note    Indications:  Based on my examination of this patient's wound(s) today, sharp excision into subcutaneous tissue is required to promote healing and evaluate the extent of previous healing.    Performed by: RAFAEL Zambrano CNP    Consent obtained: Yes    Time out taken:  Yes    Pain Control: 2% Lidocaine topical spray    Debridement:Excisional Debridement    Using curette the wound(s) was/were sharply debrided down through and including the removal of subcutaneous tissue.        Devitalized Tissue Debrided:  fibrin, biofilm, slough, and exudate    Pre Debridement Measurements:  Are located in the Wound Documentation Flow Sheet    All active wounds listed below with today's date are evaluated  Wound(s)    debrided this date include # : 1     Post  Debridement Measurements:  Wound 08/24/23 #1 Right Leg amp site (Active)   Wound Image   05/14/24 0933   Wound Etiology Non-Healing Surgical 05/14/24 0933   Dressing Status New dressing applied 05/14/24 0958   Wound Cleansed Wound cleanser 05/14/24 0933   Offloading for Diabetic Foot Ulcers Other (comment) 05/07/24 1020   Wound Length (cm) 0.4 cm 05/14/24 0933   Wound Width (cm) 1.8 cm 05/14/24 0933   Wound Depth (cm) 0.5 cm 05/14/24 0933   Wound Surface Area (cm^2) 0.72 cm^2 05/14/24 0933   Change in Wound Size % (l*w) -1100 05/14/24 0933   Wound Volume (cm^3) 0.36 cm^3 05/14/24 0933   Wound Healing % -5900 05/14/24 0933   Post-Procedure Length (cm) 0.4 cm 05/14/24 0958   Post-Procedure Width (cm) 1.8 cm 05/14/24 0958   Post-Procedure Depth (cm) 0.5 cm 05/14/24 0958   Post-Procedure Surface Area (cm^2) 0.72 cm^2 05/14/24 0958   Post-Procedure Volume (cm^3) 0.36 cm^3 05/14/24 0958   Distance Tunneling (cm) 0 cm 05/14/24 0933   Tunneling Position ___ O'Clock 0 05/14/24 0933   Undermining Starts ___ O'Clock 0 05/14/24 0933   Undermining Ends___ O'Clock 0 05/14/24 0933   Undermining Maxium Distance (cm) 0 05/14/24 0933   Wound Assessment

## 2024-05-21 ENCOUNTER — HOSPITAL ENCOUNTER (OUTPATIENT)
Dept: WOUND CARE | Age: 44
Discharge: HOME | End: 2024-05-21
Attending: NURSE PRACTITIONER
Payer: COMMERCIAL

## 2024-05-21 VITALS
DIASTOLIC BLOOD PRESSURE: 80 MMHG | SYSTOLIC BLOOD PRESSURE: 129 MMHG | TEMPERATURE: 97.1 F | RESPIRATION RATE: 16 BRPM | HEART RATE: 80 BPM

## 2024-05-21 DIAGNOSIS — Z89.511 S/P BKA (BELOW KNEE AMPUTATION), RIGHT (HCC): ICD-10-CM

## 2024-05-21 DIAGNOSIS — T87.89 PRESSURE ULCER OF BKA STUMP, STAGE 3 (HCC): ICD-10-CM

## 2024-05-21 DIAGNOSIS — L89.893 PRESSURE ULCER OF BKA STUMP, STAGE 3 (HCC): ICD-10-CM

## 2024-05-21 DIAGNOSIS — Z89.412: Primary | ICD-10-CM

## 2024-05-21 PROCEDURE — 11042 DBRDMT SUBQ TIS 1ST 20SQCM/<: CPT

## 2024-05-21 PROCEDURE — 99213 OFFICE O/P EST LOW 20 MIN: CPT

## 2024-05-21 RX ORDER — BACITRACIN ZINC AND POLYMYXIN B SULFATE 500; 1000 [USP'U]/G; [USP'U]/G
OINTMENT TOPICAL ONCE
OUTPATIENT
Start: 2024-05-21 | End: 2024-05-21

## 2024-05-21 RX ORDER — LIDOCAINE 50 MG/G
OINTMENT TOPICAL ONCE
OUTPATIENT
Start: 2024-05-21 | End: 2024-05-21

## 2024-05-21 RX ORDER — SODIUM CHLOR/HYPOCHLOROUS ACID 0.033 %
SOLUTION, IRRIGATION IRRIGATION ONCE
OUTPATIENT
Start: 2024-05-21 | End: 2024-05-21

## 2024-05-21 RX ORDER — LIDOCAINE HYDROCHLORIDE 40 MG/ML
SOLUTION TOPICAL ONCE
OUTPATIENT
Start: 2024-05-21 | End: 2024-05-21

## 2024-05-21 RX ORDER — LIDOCAINE 40 MG/G
CREAM TOPICAL ONCE
OUTPATIENT
Start: 2024-05-21 | End: 2024-05-21

## 2024-05-21 RX ORDER — GENTAMICIN SULFATE 1 MG/G
OINTMENT TOPICAL ONCE
OUTPATIENT
Start: 2024-05-21 | End: 2024-05-21

## 2024-05-21 RX ORDER — CLOBETASOL PROPIONATE 0.5 MG/G
OINTMENT TOPICAL ONCE
OUTPATIENT
Start: 2024-05-21 | End: 2024-05-21

## 2024-05-21 RX ORDER — BETAMETHASONE DIPROPIONATE 0.5 MG/G
CREAM TOPICAL ONCE
OUTPATIENT
Start: 2024-05-21 | End: 2024-05-21

## 2024-05-21 RX ORDER — BACITRACIN ZINC 500 [USP'U]/G
OINTMENT TOPICAL ONCE
OUTPATIENT
Start: 2024-05-21 | End: 2024-05-21

## 2024-05-21 RX ORDER — OMEPRAZOLE 10 MG/1
10 CAPSULE, DELAYED RELEASE ORAL DAILY
COMMUNITY

## 2024-05-21 RX ORDER — IBUPROFEN 200 MG
TABLET ORAL ONCE
OUTPATIENT
Start: 2024-05-21 | End: 2024-05-21

## 2024-05-21 RX ORDER — TRIAMCINOLONE ACETONIDE 1 MG/G
OINTMENT TOPICAL ONCE
OUTPATIENT
Start: 2024-05-21 | End: 2024-05-21

## 2024-05-21 NOTE — PROGRESS NOTES
Wound Care Center Progress Note With Procedure    Julien Howard  AGE: 43 y.o.   GENDER: male  : 1980  EPISODE DATE:  2024     Subjective:       CHIEF COMPLAINT  WOUND   Chief Complaint   Patient presents with    Wound Check         HISTORY of PRESENT ILLNESS      Julien Howard is a 43 y.o. 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 The underlying cause of the wound is pressure and non-healing surgical. The patient has significant underlying medical conditions as below. The patient has chronic depression and anxiety related to the death of his mother and changes in his living situation. He now resides at [facility]. RAFAEL Howard CNP is PCP last seen 23.    Wound Pain Timing/Severity: none  Quality of pain: N/A  Severity of pain:  0 / 10   Modifying Factors: diabetes, poorly controlled diabetic and chronic pressure  Associated Signs/Symptoms: drainage '        24   Discussed plan for revision of right BKA stump.       Diabetes: Yes, on an oral and insulin regimen  Hemoglobin A1C   Date Value Ref Range Status   2023 11.8 See comment % Final     Comment:     Comment:  Diagnosis of Diabetes: > or = 6.5%  Increased risk of diabetes (Prediabetes): 5.7-6.4%  Glycemic Control: Nonpregnant Adults: <7.0%                    Pregnant: <6.0%          Diabetes education provided today:    Diabetes pathoetiology, difference between type 1 and type 2 diabetes, and progressive nature of Type 2 DM.  Diabetic Neuropathy: signs and therapy.  Foot care: advised to wash feet daily, pat dry and apply lotion at night, avoiding between toes. Need to look at feet daily and report to a physician any signs of inflammation or skin damage. Discussed diabetes shoes and socks.  Diabetic management related to wound care    Smoking: Never smoker    Tobacco Use      Smoking status:

## 2024-05-21 NOTE — PATIENT INSTRUCTIONS
PHYSICIAN ORDERS AND DISCHARGE INSTRUCTIONS     NOTE: Upon discharge from the Wound Center, you will receive a patient experience survey. We would be grateful if you would take the time to fill this survey out.     Wound cleansing:              Do not scrub or use excessive force.              Wash hands with soap and water before and after dressing changes.              Prior to applying a clean dressing, cleanse wound with normal saline, wound cleanser, or mild soap and water.              Ask the physician or nurse before getting the wound(s) wet in a shower     Wound Care Notes:  Rx: [address] [facility]  Apply for grafts 22: aLL GRAFTS approved 22 for Left Toe amp site  Reapply for graft 22 for Left toes amp site   YADY's   Right 1.07      Left    1.1           Date: 08/15/22  Lives at [place] assisted [facility]      Grafts Left Foot Amp Site  Puraply #1 Graft #1 4x4 fenestrated 06/15/22   Puraply #2 Graft #2 4x4 fenestrated 22                                    Orders for this week: 2024      FAX ORDERS TO [facility]!    Discussion of right BKA revision plan 24    Right leg amp site - Wash with soap and water, pat dry.  Apply (colactive ag powder when available) otherwise Stimulen to wound bed. Bolster edges with steristrips.   Cover with Agile and silicone island border.  Wrap with Ace.  At home Change twice per week     Dispense 30 day quantity when ordering supplies.  Plan: Wheelchair Repair: Rehab Medical Rep Jose SAENZ. [phone].  Supply request faxed to [facility] 3/21/24  Pt reports Surgery at [place] possible       Nurse Visit   Follow Up Instructions: At the Wound Care Center 1 week: Tuesday   Primary Wound Care Provider: Mallory Marinelli CNP  Call  for any questions or concerns.  Central [phone]

## 2024-05-23 ENCOUNTER — PREP FOR PROCEDURE (OUTPATIENT)
Dept: SURGERY | Age: 44
End: 2024-05-23

## 2024-05-23 ENCOUNTER — TELEPHONE (OUTPATIENT)
Dept: SURGERY | Age: 44
End: 2024-05-23

## 2024-05-23 DIAGNOSIS — L89.899 PRESSURE ULCER OF BKA STUMP (HCC): ICD-10-CM

## 2024-05-23 DIAGNOSIS — L89.893 DECUBITUS ULCER OF LEG, STAGE 3 (HCC): ICD-10-CM

## 2024-05-23 DIAGNOSIS — T87.89 PRESSURE ULCER OF BKA STUMP (HCC): ICD-10-CM

## 2024-05-23 NOTE — TELEPHONE ENCOUNTER
SPOKE TO  Julien Howard REGARDING SURGERY (BKA REVISION) SCHEDULED @ [facility]. NOTIFIED OF DATES, TIMES AND LOCATION    PHONE ASSESSMENT   SURGERY - 5/30 @ 9:30AM ARRIVAL @ 7:30AM  P/O - WOUND CARE    NPO AFTER MIDNIGHT  (ENTER OTHER PREP INFO)  HOLD BLOOD THINNERS - NA

## 2024-05-28 ENCOUNTER — HOSPITAL ENCOUNTER (OUTPATIENT)
Dept: WOUND CARE | Age: 44
Discharge: HOME | End: 2024-05-28
Attending: NURSE PRACTITIONER
Payer: COMMERCIAL

## 2024-05-28 VITALS
TEMPERATURE: 97 F | RESPIRATION RATE: 18 BRPM | SYSTOLIC BLOOD PRESSURE: 143 MMHG | DIASTOLIC BLOOD PRESSURE: 89 MMHG | HEART RATE: 71 BPM

## 2024-05-28 DIAGNOSIS — T87.89 PRESSURE ULCER OF BKA STUMP, STAGE 3 (HCC): ICD-10-CM

## 2024-05-28 DIAGNOSIS — L89.893 PRESSURE ULCER OF BKA STUMP, STAGE 3 (HCC): ICD-10-CM

## 2024-05-28 DIAGNOSIS — Z89.511 S/P BKA (BELOW KNEE AMPUTATION), RIGHT (HCC): ICD-10-CM

## 2024-05-28 DIAGNOSIS — Z89.412: Primary | ICD-10-CM

## 2024-05-28 PROCEDURE — 11042 DBRDMT SUBQ TIS 1ST 20SQCM/<: CPT

## 2024-05-28 RX ORDER — BACITRACIN ZINC 500 [USP'U]/G
OINTMENT TOPICAL ONCE
OUTPATIENT
Start: 2024-05-28 | End: 2024-05-28

## 2024-05-28 RX ORDER — CLOBETASOL PROPIONATE 0.5 MG/G
OINTMENT TOPICAL ONCE
OUTPATIENT
Start: 2024-05-28 | End: 2024-05-28

## 2024-05-28 RX ORDER — TRIAMCINOLONE ACETONIDE 1 MG/G
OINTMENT TOPICAL ONCE
OUTPATIENT
Start: 2024-05-28 | End: 2024-05-28

## 2024-05-28 RX ORDER — GENTAMICIN SULFATE 1 MG/G
OINTMENT TOPICAL ONCE
OUTPATIENT
Start: 2024-05-28 | End: 2024-05-28

## 2024-05-28 RX ORDER — BACITRACIN ZINC AND POLYMYXIN B SULFATE 500; 1000 [USP'U]/G; [USP'U]/G
OINTMENT TOPICAL ONCE
OUTPATIENT
Start: 2024-05-28 | End: 2024-05-28

## 2024-05-28 RX ORDER — LIDOCAINE 50 MG/G
OINTMENT TOPICAL ONCE
OUTPATIENT
Start: 2024-05-28 | End: 2024-05-28

## 2024-05-28 RX ORDER — LIDOCAINE HYDROCHLORIDE 40 MG/ML
SOLUTION TOPICAL ONCE
OUTPATIENT
Start: 2024-05-28 | End: 2024-05-28

## 2024-05-28 RX ORDER — IBUPROFEN 200 MG
TABLET ORAL ONCE
OUTPATIENT
Start: 2024-05-28 | End: 2024-05-28

## 2024-05-28 RX ORDER — SODIUM CHLOR/HYPOCHLOROUS ACID 0.033 %
SOLUTION, IRRIGATION IRRIGATION ONCE
OUTPATIENT
Start: 2024-05-28 | End: 2024-05-28

## 2024-05-28 RX ORDER — LIDOCAINE 40 MG/G
CREAM TOPICAL ONCE
OUTPATIENT
Start: 2024-05-28 | End: 2024-05-28

## 2024-05-28 RX ORDER — BETAMETHASONE DIPROPIONATE 0.5 MG/G
CREAM TOPICAL ONCE
OUTPATIENT
Start: 2024-05-28 | End: 2024-05-28

## 2024-05-28 NOTE — PROGRESS NOTES
Wound Care Center Progress Note With Procedure    Julien Howard  AGE: 43 y.o.   GENDER: male  : 1980  EPISODE DATE:  2024     Subjective:       CHIEF COMPLAINT  WOUND   Chief Complaint   Patient presents with    Wound Check         HISTORY of PRESENT ILLNESS      Julien Howard is a 43 y.o. 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 The underlying cause of the wound is pressure and non-healing surgical. The patient has significant underlying medical conditions as below. The patient has chronic depression and anxiety related to the death of his mother and changes in his living situation. He now resides at [facility]. RAFAEL Howard CNP is PCP last seen 23.    Wound Pain Timing/Severity: none  Quality of pain: N/A  Severity of pain:  0 / 10   Modifying Factors: diabetes, poorly controlled diabetic and chronic pressure  Associated Signs/Symptoms: drainage       24   BKA stump revision this week.  Patient ready to proceed. No new questions       Diabetes: Yes, on an oral and insulin regimen  Hemoglobin A1C   Date Value Ref Range Status   2023 11.8 See comment % Final     Comment:     Comment:  Diagnosis of Diabetes: > or = 6.5%  Increased risk of diabetes (Prediabetes): 5.7-6.4%  Glycemic Control: Nonpregnant Adults: <7.0%                    Pregnant: <6.0%          Diabetes education provided today:    Diabetes pathoetiology, difference between type 1 and type 2 diabetes, and progressive nature of Type 2 DM.  Diabetic Neuropathy: signs and therapy.  Foot care: advised to wash feet daily, pat dry and apply lotion at night, avoiding between toes. Need to look at feet daily and report to a physician any signs of inflammation or skin damage. Discussed diabetes shoes and socks.  Diabetic management related to wound care    Smoking: Never smoker    Tobacco Use

## 2024-05-28 NOTE — PATIENT INSTRUCTIONS
PHYSICIAN ORDERS AND DISCHARGE INSTRUCTIONS     NOTE: Upon discharge from the Wound Center, you will receive a patient experience survey. We would be grateful if you would take the time to fill this survey out.     Wound cleansing:              Do not scrub or use excessive force.              Wash hands with soap and water before and after dressing changes.              Prior to applying a clean dressing, cleanse wound with normal saline, wound cleanser, or mild soap and water.              Ask the physician or nurse before getting the wound(s) wet in a shower     Wound Care Notes:  Rx: [address] [facility]  Apply for grafts 22: aLL GRAFTS approved 22 for Left Toe amp site  Reapply for graft 22 for Left toes amp site   YADY's   Right 1.07      Left    1.1           Date: 08/15/22  Lives at [place] assisted [facility]      Grafts Left Foot Amp Site  Puraply #1 Graft #1 4x4 fenestrated 06/15/22   Puraply #2 Graft #2 4x4 fenestrated 22                                Orders for this week: 2024      FAX ORDERS TO [facility]!    Discussion of right BKA revision plan 24    Right leg amp site - Wash with soap and water, pat dry.  Apply (colactive ag powder when available) otherwise Stimulen to wound bed. Bolster edges with steristrips.   Cover with Agile and silicone island border.  Wrap with Ace.  At home Change twice per week     Dispense 30 day quantity when ordering supplies.  Plan: Wheelchair Repair: Rehab Medical Rep Jose SAENZ. [phone].  Supply request faxed to [facility] 3/21/24  Pt reports Surgery at [place] possible       Nurse Visit   Follow Up Instructions: Discharged  Primary Wound Care Provider: Mallory Marinelli CNP  Call  for any questions or concerns.  Central [phone]

## 2024-05-30 ENCOUNTER — HOSPITAL ENCOUNTER (EMERGENCY)
Age: 44
Discharge: HOME | End: 2024-05-30
Attending: STUDENT IN AN ORGANIZED HEALTH CARE EDUCATION/TRAINING PROGRAM
Payer: COMMERCIAL

## 2024-05-30 ENCOUNTER — APPOINTMENT (OUTPATIENT)
Dept: GENERAL RADIOLOGY | Age: 44
End: 2024-05-30
Payer: COMMERCIAL

## 2024-05-30 ENCOUNTER — APPOINTMENT (OUTPATIENT)
Dept: ULTRASOUND IMAGING | Age: 44
End: 2024-05-30
Payer: COMMERCIAL

## 2024-05-30 VITALS
SYSTOLIC BLOOD PRESSURE: 144 MMHG | HEIGHT: 72 IN | DIASTOLIC BLOOD PRESSURE: 97 MMHG | HEART RATE: 89 BPM | TEMPERATURE: 98.1 F | OXYGEN SATURATION: 98 % | WEIGHT: 170 LBS | RESPIRATION RATE: 19 BRPM | BODY MASS INDEX: 23.03 KG/M2

## 2024-05-30 DIAGNOSIS — R10.11 RIGHT UPPER QUADRANT ABDOMINAL PAIN: Primary | ICD-10-CM

## 2024-05-30 DIAGNOSIS — K83.8 BILIARY SLUDGE: ICD-10-CM

## 2024-05-30 LAB
ALBUMIN SERPL-MCNC: 4.6 GM/DL (ref 3.4–5)
ALP BLD-CCNC: 74 IU/L (ref 40–128)
ALT SERPL-CCNC: 38 U/L (ref 10–40)
ANION GAP SERPL CALCULATED.3IONS-SCNC: 15 MMOL/L (ref 7–16)
AST SERPL-CCNC: 41 IU/L (ref 15–37)
B-OH-BUTYR SERPL-MCNC: 2.1 MG/DL (ref 0–3)
BASE EXCESS: 6 (ref 0–3)
BASOPHILS ABSOLUTE: 0.1 K/CU MM
BASOPHILS RELATIVE PERCENT: 0.5 % (ref 0–1)
BILIRUB SERPL-MCNC: 0.2 MG/DL (ref 0–1)
BUN SERPL-MCNC: 11 MG/DL (ref 6–23)
CALCIUM SERPL-MCNC: 8.7 MG/DL (ref 8.3–10.6)
CHLORIDE BLD-SCNC: 102 MMOL/L (ref 99–110)
CO2: 16 MMOL/L (ref 21–32)
COMMENT: ABNORMAL
CREAT SERPL-MCNC: 0.9 MG/DL (ref 0.9–1.3)
DIFFERENTIAL TYPE: ABNORMAL
EKG ATRIAL RATE: 95 BPM
EKG DIAGNOSIS: NORMAL
EKG P AXIS: 54 DEGREES
EKG P-R INTERVAL: 168 MS
EKG Q-T INTERVAL: 342 MS
EKG QRS DURATION: 88 MS
EKG QTC CALCULATION (BAZETT): 429 MS
EKG R AXIS: 15 DEGREES
EKG T AXIS: 5 DEGREES
EKG VENTRICULAR RATE: 95 BPM
EOSINOPHILS ABSOLUTE: 0.2 K/CU MM
EOSINOPHILS RELATIVE PERCENT: 1.7 % (ref 0–3)
GFR, ESTIMATED: >90 ML/MIN/1.73M2
GLUCOSE SERPL-MCNC: 197 MG/DL (ref 70–99)
HCO3 VENOUS: 19.4 MMOL/L (ref 22–29)
HCT VFR BLD CALC: 43.4 % (ref 42–52)
HEMOGLOBIN: 14.6 GM/DL (ref 13.5–18)
IMMATURE NEUTROPHIL %: 0.6 % (ref 0–0.43)
LIPASE: 33 IU/L (ref 13–60)
LYMPHOCYTES ABSOLUTE: 2.5 K/CU MM
LYMPHOCYTES RELATIVE PERCENT: 26.4 % (ref 24–44)
MCH RBC QN AUTO: 29.5 PG (ref 27–31)
MCHC RBC AUTO-ENTMCNC: 33.6 % (ref 32–36)
MCV RBC AUTO: 87.7 FL (ref 78–100)
MONOCYTES ABSOLUTE: 0.7 K/CU MM
MONOCYTES RELATIVE PERCENT: 7.1 % (ref 0–4)
NEUTROPHILS ABSOLUTE: 6 K/CU MM
NEUTROPHILS RELATIVE PERCENT: 63.7 % (ref 36–66)
NUCLEATED RBC %: 0 %
O2 SAT, VEN: 93.1 % (ref 50–70)
PCO2, VEN: 36 MMHG (ref 41–51)
PDW BLD-RTO: 13.4 % (ref 11.7–14.9)
PH VENOUS: 7.34 (ref 7.32–7.43)
PLATELET # BLD: 373 K/CU MM (ref 140–440)
PMV BLD AUTO: 9.5 FL (ref 7.5–11.1)
PO2, VEN: 74 MMHG (ref 28–48)
POTASSIUM SERPL-SCNC: 4.4 MMOL/L (ref 3.5–5.1)
PRO-BNP: 70.11 PG/ML
RBC # BLD: 4.95 M/CU MM (ref 4.6–6.2)
SODIUM BLD-SCNC: 133 MMOL/L (ref 135–145)
TOTAL IMMATURE NEUTOROPHIL: 0.06 K/CU MM
TOTAL NUCLEATED RBC: 0 K/CU MM
TOTAL PROTEIN: 9.2 GM/DL (ref 6.4–8.2)
TROPONIN, HIGH SENSITIVITY: 18 NG/L (ref 0–22)
TROPONIN, HIGH SENSITIVITY: 20 NG/L (ref 0–22)
WBC # BLD: 9.4 K/CU MM (ref 4–10.5)

## 2024-05-30 PROCEDURE — 83880 ASSAY OF NATRIURETIC PEPTIDE: CPT

## 2024-05-30 PROCEDURE — 80053 COMPREHEN METABOLIC PANEL: CPT

## 2024-05-30 PROCEDURE — 76705 ECHO EXAM OF ABDOMEN: CPT

## 2024-05-30 PROCEDURE — 96374 THER/PROPH/DIAG INJ IV PUSH: CPT

## 2024-05-30 PROCEDURE — 85025 COMPLETE CBC W/AUTO DIFF WBC: CPT

## 2024-05-30 PROCEDURE — 83690 ASSAY OF LIPASE: CPT

## 2024-05-30 PROCEDURE — 71045 X-RAY EXAM CHEST 1 VIEW: CPT

## 2024-05-30 PROCEDURE — 96375 TX/PRO/DX INJ NEW DRUG ADDON: CPT

## 2024-05-30 PROCEDURE — 6360000002 HC RX W HCPCS: Performed by: STUDENT IN AN ORGANIZED HEALTH CARE EDUCATION/TRAINING PROGRAM

## 2024-05-30 PROCEDURE — 93005 ELECTROCARDIOGRAM TRACING: CPT | Performed by: STUDENT IN AN ORGANIZED HEALTH CARE EDUCATION/TRAINING PROGRAM

## 2024-05-30 PROCEDURE — 2500000003 HC RX 250 WO HCPCS: Performed by: STUDENT IN AN ORGANIZED HEALTH CARE EDUCATION/TRAINING PROGRAM

## 2024-05-30 PROCEDURE — 93010 ELECTROCARDIOGRAM REPORT: CPT | Performed by: INTERNAL MEDICINE

## 2024-05-30 PROCEDURE — 82805 BLOOD GASES W/O2 SATURATION: CPT

## 2024-05-30 PROCEDURE — 99285 EMERGENCY DEPT VISIT HI MDM: CPT

## 2024-05-30 PROCEDURE — 2580000003 HC RX 258: Performed by: STUDENT IN AN ORGANIZED HEALTH CARE EDUCATION/TRAINING PROGRAM

## 2024-05-30 PROCEDURE — 84484 ASSAY OF TROPONIN QUANT: CPT

## 2024-05-30 PROCEDURE — 82010 KETONE BODYS QUAN: CPT

## 2024-05-30 PROCEDURE — 6370000000 HC RX 637 (ALT 250 FOR IP): Performed by: STUDENT IN AN ORGANIZED HEALTH CARE EDUCATION/TRAINING PROGRAM

## 2024-05-30 RX ORDER — FAMOTIDINE 10 MG/ML
20 INJECTION, SOLUTION INTRAVENOUS ONCE
Status: COMPLETED | OUTPATIENT
Start: 2024-05-30 | End: 2024-05-30

## 2024-05-30 RX ORDER — ACETAMINOPHEN 500 MG
1000 TABLET ORAL ONCE
Status: COMPLETED | OUTPATIENT
Start: 2024-05-30 | End: 2024-05-30

## 2024-05-30 RX ORDER — KETOROLAC TROMETHAMINE 15 MG/ML
15 INJECTION, SOLUTION INTRAMUSCULAR; INTRAVENOUS ONCE
Status: COMPLETED | OUTPATIENT
Start: 2024-05-30 | End: 2024-05-30

## 2024-05-30 RX ORDER — 0.9 % SODIUM CHLORIDE 0.9 %
500 INTRAVENOUS SOLUTION INTRAVENOUS ONCE
Status: COMPLETED | OUTPATIENT
Start: 2024-05-30 | End: 2024-05-30

## 2024-05-30 RX ADMIN — KETOROLAC TROMETHAMINE 15 MG: 15 INJECTION, SOLUTION INTRAMUSCULAR; INTRAVENOUS at 08:03

## 2024-05-30 RX ADMIN — FAMOTIDINE 20 MG: 10 INJECTION, SOLUTION INTRAVENOUS at 08:03

## 2024-05-30 RX ADMIN — SODIUM CHLORIDE 500 ML: 9 INJECTION, SOLUTION INTRAVENOUS at 08:03

## 2024-05-30 RX ADMIN — ACETAMINOPHEN 1000 MG: 500 TABLET ORAL at 08:03

## 2024-05-30 ASSESSMENT — ENCOUNTER SYMPTOMS
VOMITING: 0
DIARRHEA: 0
NAUSEA: 1
SORE THROAT: 0
SHORTNESS OF BREATH: 0
ABDOMINAL PAIN: 1
COUGH: 0
BLOOD IN STOOL: 0

## 2024-05-30 ASSESSMENT — LIFESTYLE VARIABLES
HOW OFTEN DO YOU HAVE A DRINK CONTAINING ALCOHOL: NEVER
HOW MANY STANDARD DRINKS CONTAINING ALCOHOL DO YOU HAVE ON A TYPICAL DAY: PATIENT DOES NOT DRINK

## 2024-05-30 ASSESSMENT — PAIN SCALES - GENERAL
PAINLEVEL_OUTOF10: 9
PAINLEVEL_OUTOF10: 0
PAINLEVEL_OUTOF10: 8

## 2024-05-30 ASSESSMENT — PAIN DESCRIPTION - PAIN TYPE: TYPE: ACUTE PAIN

## 2024-05-30 ASSESSMENT — PAIN DESCRIPTION - LOCATION: LOCATION: ABDOMEN

## 2024-05-30 ASSESSMENT — PAIN DESCRIPTION - ORIENTATION: ORIENTATION: RIGHT;LEFT;MID

## 2024-05-30 NOTE — ED PROVIDER NOTES
5/4/23   Mary Syed APRN - CNP   clopidogrel (PLAVIX) 75 MG tablet Take 1 tablet by mouth daily  Patient not taking: Reported on 5/28/2024 5/4/23   Mary Syed APRN - CNP   atorvastatin (LIPITOR) 40 MG tablet Take 1 tablet by mouth nightly 5/4/23 7/13/23  Mary Syed APRN - CNP   pantoprazole (PROTONIX) 40 MG tablet Take 1 tablet by mouth every morning (before breakfast)  Patient not taking: Reported on 5/23/2024 5/4/23   Mary Syed APRN - CNP   ondansetron (ZOFRAN) 4 MG tablet Take 1 tablet by mouth daily as needed for Nausea or Vomiting 5/4/23   Mary Syed APRN - CNP   Insulin Pen Needle ([employer] PEN NEEDLES) 31G X 6 MM MISC 1 each by Does not apply route daily 5/4/23   Mary Syed APRN - CNP   blood glucose monitor strips Test 2 times a day 4/25/23   Mary Syed APRN - CNP   glucose monitoring (FREESTYLE FREEDOM) kit 1 kit by Does not apply route daily 4/24/23   Mary Syed APRN - CNP   Lancets MISC 1 each by Does not apply route daily 4/24/23   Mary Syed APRN - CNP   aspirin 81 MG chewable tablet Take 1 tablet by mouth daily Take 81 mg by mouth daily 4/7/23   Eugenia Coleman,    docusate sodium (COLACE, DULCOLAX) 100 MG CAPS Take 100 mg by mouth daily  Patient not taking: Reported on 5/23/2024 9/17/22   REDD Irizarry MD   metoclopramide (REGLAN) 10 MG tablet Take 1 tablet by mouth 4 times daily (before meals and nightly) 8/29/22 9/2/22  Augustus Heart APRN - CNP   Alcohol Swabs PADS  4/27/22   Rico Chance MD   pioglitazone (ACTOS) 30 MG tablet Take 1 tablet by mouth daily 4/27/22 9/16/22  Ce Garcia MD       REVIEW OF SYSTEMS       Review of Systems   Constitutional:  Negative for chills and fever.   HENT:  Negative for congestion and sore throat.    Respiratory:  Negative for cough and shortness of breath.    Cardiovascular:  Negative for chest pain.   Gastrointestinal:  Positive for abdominal pain and nausea. Negative for blood in stool, diarrhea

## 2024-05-30 NOTE — DISCHARGE INSTRUCTIONS
You are seen here today for abdominal pain.  Your lab workup is reassuring.  Your ultrasound shows some biliary sludge and questionable pancreatitis.  However your lab work does not show any evidence of pancreatitis and your exam is not consistent with pancreatitis.  I recommend following a bland food diet for the next few days.  Once you are feeling better increase your diet back to a normal diet.  For pain I recommend taking 1000 mg of Tylenol every 8 hours, 600 mg ibuprofen with food.  I also recommend you continue taking your home Protonix or omeprazole.    Return the emergency department merely for any black or bloody stools, worsening pain, fevers, persistent nausea and vomiting, new worsening or concerning complaints.

## 2024-05-30 NOTE — ED TRIAGE NOTES
Patient to the ED from [place]. Patient stated he has had abd pain since 9pm last night. Patient also reports his blood pressure is high. States he was supposed to have surgery this morning.

## 2024-06-06 LAB — DIABETIC RETINOPATHY: POSITIVE

## 2024-06-10 NOTE — ANESTHESIA POSTPROCEDURE EVALUATION
Department of Anesthesiology  Postprocedure Note    Patient: Tracy Dixon  MRN: [id number]  YOB: 1980  Date of evaluation: 9/3/2022      Procedure Summary     Date: 09/02/22 Room / Location: [address]    Anesthesia Start: 2256 Anesthesia Stop: 09/03/22 0041    Procedure: LEG AMPUTATION BELOW KNEE (Right: Leg Lower) Diagnosis:       Necrosis ([address]      (necrotizing fasciitis)    Surgeons: Marry Delgado MD Responsible Provider: Sarthak Ibanez MD    Anesthesia Type: general ASA Status: 3 - Emergent          Anesthesia Type: No value filed.     Karla Phase I:      Karla Phase II:        Anesthesia Post Evaluation    Patient location during evaluation: ICU  Patient participation: complete - patient participated  Level of consciousness: awake and alert  Pain score: 0  Airway patency: patent  Nausea & Vomiting: no nausea and no vomiting  Complications: no  Cardiovascular status: hemodynamically stable  Respiratory status: acceptable and face mask  Hydration status: euvolemic  Multimodal analgesia pain management approach
PRE-OP DIAGNOSIS:  History of bilateral mastectomy 10-Christiano-2024 14:48:36  Nando Skelton

## 2024-06-11 ENCOUNTER — HOSPITAL ENCOUNTER (OUTPATIENT)
Dept: WOUND CARE | Age: 44
Discharge: HOME | End: 2024-06-11
Attending: NURSE PRACTITIONER

## 2024-06-16 ENCOUNTER — HOSPITAL ENCOUNTER (EMERGENCY)
Age: 44
Discharge: HOME | End: 2024-06-16
Payer: COMMERCIAL

## 2024-06-16 VITALS
TEMPERATURE: 98.5 F | RESPIRATION RATE: 16 BRPM | WEIGHT: 170 LBS | DIASTOLIC BLOOD PRESSURE: 95 MMHG | SYSTOLIC BLOOD PRESSURE: 155 MMHG | OXYGEN SATURATION: 99 % | HEART RATE: 82 BPM | HEIGHT: 72 IN | BODY MASS INDEX: 23.03 KG/M2

## 2024-06-16 DIAGNOSIS — H53.8 BLURRY VISION, RIGHT EYE: Primary | ICD-10-CM

## 2024-06-16 DIAGNOSIS — H57.11 ACUTE RIGHT EYE PAIN: ICD-10-CM

## 2024-06-16 DIAGNOSIS — Z98.890 H/O EYE SURGERY: ICD-10-CM

## 2024-06-16 LAB — GLUCOSE BLD-MCNC: 153 MG/DL (ref 70–99)

## 2024-06-16 PROCEDURE — 99283 EMERGENCY DEPT VISIT LOW MDM: CPT

## 2024-06-16 PROCEDURE — 6370000000 HC RX 637 (ALT 250 FOR IP): Performed by: PHYSICIAN ASSISTANT

## 2024-06-16 PROCEDURE — 82962 GLUCOSE BLOOD TEST: CPT

## 2024-06-16 RX ORDER — TOBRAMYCIN 3 MG/ML
2 SOLUTION/ DROPS OPHTHALMIC ONCE
Status: COMPLETED | OUTPATIENT
Start: 2024-06-16 | End: 2024-06-16

## 2024-06-16 RX ORDER — TETRACAINE HYDROCHLORIDE 5 MG/ML
2 SOLUTION OPHTHALMIC ONCE
Status: COMPLETED | OUTPATIENT
Start: 2024-06-16 | End: 2024-06-16

## 2024-06-16 RX ADMIN — FLUORESCEIN SODIUM 1 MG: 1 STRIP OPHTHALMIC at 17:00

## 2024-06-16 RX ADMIN — TETRACAINE HYDROCHLORIDE 2 DROP: 5 SOLUTION OPHTHALMIC at 17:23

## 2024-06-16 RX ADMIN — TOBRAMYCIN OPHTHALMIC SOLUTION 2 DROP: 3 SOLUTION/ DROPS OPHTHALMIC at 19:44

## 2024-06-16 ASSESSMENT — PAIN SCALES - GENERAL: PAINLEVEL_OUTOF10: 6

## 2024-06-16 ASSESSMENT — VISUAL ACUITY
OD: 10/100
OS: 20/100

## 2024-06-16 ASSESSMENT — PAIN DESCRIPTION - ORIENTATION: ORIENTATION: RIGHT

## 2024-06-16 ASSESSMENT — PAIN - FUNCTIONAL ASSESSMENT: PAIN_FUNCTIONAL_ASSESSMENT: 0-10

## 2024-06-16 ASSESSMENT — PAIN DESCRIPTION - LOCATION: LOCATION: EYE

## 2024-06-16 NOTE — ED PROVIDER NOTES
**ADVANCED PRACTICE PROVIDER, I HAVE EVALUATED THIS PATIENT**        [facility] EMERGENCY DEPARTMENT  EMERGENCY DEPARTMENT ENCOUNTER      Pt Name: Julien Howard  MRN:[id number]  Birthdate 1980  Date of evaluation: 6/16/2024  Provider: Salvador Doss PA-C      Chief Complaint:    Chief Complaint   Patient presents with    Blurred Vision     Right eye pain and blurred vision. Surgery last Friday stating \"did not get any medications for it\"          Nursing Notes, Past Medical Hx, Past Surgical Hx, Social Hx, Allergies, and Family Hx were all reviewed and agreed with or any disagreements were addressed in the HPI.    HISTORY OF PRESENT ILLNESS     History from : Patient    Limitations to history : None    Julien Howard is a 43 y.o. male who presents with c/o of right issues.  Mentions recent same day laser eye surgery with [facility] Surgeon two days ago.  Mentions had some blurry vision and discomfort.  Initially burning went away.  Feels eye is dry.  Short relief at his retirement with saline.  Today has some soreness, eyelids and inside eye.  States concern since they didn't send him on patchy or eye drops. Has next f/u in one week.   Denies any worsening eye pain, loss of vision, headache, uri symptoms, double vision, drainage, eye crusting.        PastMedical/Surgical History:      Diagnosis Date    Abscess of left foot 4/22/2022    Anemia associated with acute blood loss 4/26/2022    Callus of foot     Right foot - see's Dr. Gutierrez    Cellulitis of left foot 4/22/2022    Diabetic ulcer of left midfoot associated with type 2 diabetes mellitus, with muscle involvement without evidence of necrosis (HCC) 4/26/2022    Diabetic ulcer of left midfoot associated with type 2 diabetes mellitus, with necrosis of muscle (HCC) 6/7/2021    Diabetic ulcer of right midfoot associated with type 2 diabetes mellitus, with fat layer exposed (HCC) 8/11/2020    Dizziness     positional  Time: 8:08 AM EDT  Date of encounter:  2023  Independent Historian/Clinical History and Information was obtained by:   Patient  Chief Complaint: Syncope    History is limited by: N/A    History of Present Illness:  Patient is a 52 y.o. year old female who presents to the emergency department for evaluation of syncope.  The patient states that several days ago she fell out of bed in the middle of the night and she has not been feeling well for the last 4 to 5 days.  She states that yesterday she had an episode of syncope and then earlier this morning had another episode of syncope.  The patient had no significant injury but did hit her head and did complain of some neck pain.  She has had no chest pain shortness of breath palpitations fever nausea vomiting or diarrhea.  The patient denies any bloody or black bowel movements.    HPI    Patient Care Team  Primary Care Provider: Sonia Mosquera APRN    Past Medical History:     Allergies   Allergen Reactions   • Tramadol Hcl Anaphylaxis   • Moxifloxacin Unknown - Low Severity   • Sulfa Antibiotics Unknown - Low Severity   • Tramadol Unknown - High Severity     Past Medical History:   Diagnosis Date   • Anemia    • Arthritis    • Diabetes    • Essential hypertension 2021   • History of pulmonary embolism    • Lumbago     Low back pain   • Mild left ventricular hypertrophy 2021   • Mixed hyperlipidemia 2021   • Obstructive sleep apnea    • Reflux esophagitis    • Seasonal allergies      Past Surgical History:   Procedure Laterality Date   • APPENDECTOMY     •  SECTION      , , ,    • COLONOSCOPY      2019   • COLONOSCOPY N/A 2022    Procedure: COLONOSCOPY;  Surgeon: Radha James MD;  Location:  [facility];  Service: Gastroenterology;  Laterality: N/A;  COLON POLYP    • ENDOSCOPY  2019   • HEMORRHOIDECTOMY N/A 2022    Procedure: HEMORRHOIDECTOMY;  Surgeon: Remi Reeder MD;  Location:   [facility];  Service: General;  Laterality: N/A;   • HERNIA REPAIR      2005, 2006, 2007, 2008   • HYSTERECTOMY  2004   • LAPAROSCOPIC GASTRIC BANDING     • OTHER SURGICAL HISTORY      Metal implants     Family History   Problem Relation Age of Onset   • Heart disease Mother    • Diabetes Mother         Unspecified type   • Arthritis Mother    • Heart disease Father    • Diabetes Son         Unspecified type   • Malig Hyperthermia Neg Hx        Home Medications:  Prior to Admission medications    Medication Sig Start Date End Date Taking? Authorizing Provider   Accu-Chek Frances Plus test strip USE 1 STRIP EVERY 6 HOURS 7/14/22   Silas Bhatt MD   albuterol sulfate  (90 Base) MCG/ACT inhaler Inhale 2 puffs Every 4 (Four) Hours As Needed for Wheezing. 12/18/22   Vandana Robertson APRN   ALPRAZolam (XANAX) 1 MG tablet Take 1 mg by mouth 2 (Two) Times a Day As Needed.    Silas Bhatt MD   aspirin 81 MG EC tablet Aspir-81 81 mg oral tablet,delayed release (DR/EC) take 1 tablet (81 mg) by oral route once daily   Active    Silas Bhatt MD   budesonide-formoterol (Symbicort) 160-4.5 MCG/ACT inhaler Every 12 (Twelve) Hours.    Silas Bhatt MD   cetirizine (ZyrTEC Allergy) 10 MG tablet Daily.    Silas Bhatt MD   clotrimazole (LOTRIMIN) 1 % cream Every 12 (Twelve) Hours.    Silas Bhatt MD   cyanocobalamin 1000 MCG/ML injection 1 tab(s) intramuscularly once a month    Silas Bhatt MD   doxepin (SINEquan) 25 MG capsule 1-2 cap(s) orally QHS prn INSOMNIA    Silas Bhatt MD   Dulaglutide (Trulicity) 0.75 MG/0.5ML solution pen-injector as directed subcutaneously once a week for 90 days    Silas Bhatt MD   DULoxetine (CYMBALTA) 60 MG capsule Cymbalta 60 mg oral capsule,delayed release(DR/EC) take 1 capsule (60 mg) by oral route once daily   Active    Silas Bhatt MD   gabapentin (NEURONTIN) 600 MG tablet Take 600 mg by mouth As  Needed. 6/20/22   Silas Bhatt MD   glipizide (GLUCOTROL XL) 10 MG 24 hr tablet Take 10 mg by mouth Daily. 5/20/22   Silas Bhatt MD   hydroCHLOROthiazide (HYDRODIURIL) 25 MG tablet Daily. 10/12/22   Silas Bhatt MD   HYDROcodone-acetaminophen (NORCO)  MG per tablet  11/8/22   Silas Bhatt MD   Insulin Pen Needle (Comfort EZ Pen Needles) 32G X 5 MM misc Every 12 (Twelve) Hours.    Silas Bhatt MD   lidocaine (XYLOCAINE) 5 % ointment Apply 1 application topically to the appropriate area as directed Every 2 (Two) Hours As Needed for Mild Pain . 8/16/22   Remi Reeder MD   lisinopril (PRINIVIL,ZESTRIL) 40 MG tablet lisinopril 40 mg oral tablet take 1 tablet (40 mg) by oral route once daily   Active    Silas Bhatt MD   metFORMIN (GLUCOPHAGE) 1000 MG tablet metformin 1,000 mg oral tablet take 1 tablet (1,000 mg) by oral route 2 times per day with morning and evening meals   Active    Silas Bhatt MD   metoprolol succinate XL (TOPROL-XL) 25 MG 24 hr tablet Take 25 mg by mouth 2 (Two) Times a Day. 10/12/22   Silas Bhatt MD   montelukast (SINGULAIR) 10 MG tablet Daily.    Silas Bhatt MD   oseltamivir (Tamiflu) 75 MG capsule Take 1 capsule by mouth 2 (Two) Times a Day. 12/18/22   Vandana Robertson APRN   potassium chloride (K-DUR,KLOR-CON) 10 MEQ CR tablet TAKE ONE TABLET BY MOUTH ONCE DAILY for 90    Silas Bhatt MD   pravastatin (PRAVACHOL) 40 MG tablet Take 40 mg by mouth Daily.    Silas Bhatt MD   promethazine-dextromethorphan (PROMETHAZINE-DM) 6.25-15 MG/5ML syrup Take 5 mL by mouth 4 (Four) Times a Day As Needed for Cough. 12/18/22   Vandana Robertson APRN   QUEtiapine XR (SEROquel XR) 300 MG 24 hr tablet Take 600 mg by mouth Every Evening. 11/2/22   Silas Bhatt MD Toujeo SoloStar 300 UNIT/ML solution pen-injector injection  11/8/22   Provider, MD Silas   zolpidem (AMBIEN) 10 MG tablet  "Take 10 mg by mouth At Night As Needed.    Provider, MD Silas        Social History:   Social History     Tobacco Use   • Smoking status: Every Day     Packs/day: 0.50     Types: Cigarettes   • Smokeless tobacco: Never   • Tobacco comments:     Smoked 11-20 years. last 7/24/22 1700   Vaping Use   • Vaping Use: Never used   Substance Use Topics   • Alcohol use: Yes     Comment: Occasionally drinks, less than 1 drink per day, has been drinking for less than 1 year   • Drug use: Never         Review of Systems:  Review of Systems   Constitutional: Negative for chills and fever.   HENT: Negative for congestion, ear pain and sore throat.    Eyes: Negative for pain.   Respiratory: Negative for cough, chest tightness and shortness of breath.    Cardiovascular: Negative for chest pain.   Gastrointestinal: Negative for abdominal pain, diarrhea, nausea and vomiting.   Genitourinary: Negative for flank pain and hematuria.   Musculoskeletal: Negative for joint swelling.   Skin: Negative for pallor.   Neurological: Positive for syncope, weakness and headaches. Negative for seizures.   All other systems reviewed and are negative.       Physical Exam:  /72   Pulse 99   Temp 98.6 °F (37 °C) (Oral)   Resp 16   Ht 157.5 cm (62\")   SpO2 91%   BMI 39.45 kg/m²     Physical Exam  Vitals and nursing note reviewed.   Constitutional:       General: She is not in acute distress.     Appearance: Normal appearance. She is not toxic-appearing.   HENT:      Head: Normocephalic and atraumatic.      Mouth/Throat:      Mouth: Mucous membranes are moist.   Eyes:      General: No scleral icterus.  Cardiovascular:      Rate and Rhythm: Normal rate and regular rhythm.      Pulses: Normal pulses.      Heart sounds: Normal heart sounds.   Pulmonary:      Effort: Pulmonary effort is normal. No respiratory distress.      Breath sounds: Normal breath sounds.   Abdominal:      General: Abdomen is flat.      Palpations: Abdomen is soft.      " Tenderness: There is no abdominal tenderness.   Musculoskeletal:         General: Normal range of motion.      Cervical back: Normal range of motion and neck supple.   Skin:     General: Skin is warm and dry.      Capillary Refill: Capillary refill takes less than 2 seconds.   Neurological:      General: No focal deficit present.      Mental Status: She is alert and oriented to person, place, and time. Mental status is at baseline.   Psychiatric:         Mood and Affect: Mood normal.         Behavior: Behavior normal.                  Procedures:  Procedures      Medical Decision Making:      Comorbidities that affect care:    COPD, Diabetes, Hypertension    External Notes reviewed:    Previous Clinic Note: Urgent care visit for influenza      The following orders were placed and all results were independently analyzed by me:  Orders Placed This Encounter   Procedures   • XR Chest 1 View   • CT Chest With Contrast Diagnostic   • CT Head Without Contrast   • CT Cervical Spine Without Contrast   • Moody Draw   • Comprehensive Metabolic Panel   • Magnesium   • Single High Sensitivity Troponin T   • CBC Auto Differential   • High Sensitivity Troponin T   • Lipase   • BNP   • Scan Slide   • High Sensitivity Troponin T 2Hr   • Manual Differential   • NPO Diet NPO Type: Strict NPO   • Undress & Gown   • Vital Signs   • Orthostatic Blood Pressure   • Undress & Gown   • Continuous Pulse Oximetry   • IP General Consult (Use specialty-specific consult if known)   • Oxygen Therapy- Nasal Cannula; Titrate 1-6 LPM Per SpO2; 90 - 95%   • Oxygen Therapy- Nasal Cannula; Titrate 1-6 LPM Per SpO2; 90 - 95%   • POC Glucose Once   • ECG 12 Lead ED Triage Standing Order; Syncope   • ECG 12 Lead ED Triage Standing Order; Chest Pain   • ECG 12 Lead ED Triage Standing Order; Chest Pain   • Insert Peripheral IV   • Insert Peripheral IV   • Inpatient Admission   • CBC & Differential   • Green Top (Gel)   • Lavender Top   • Gold Top - SST    • Light Blue Top       Medications Given in the Emergency Department:  Medications   sodium chloride 0.9 % flush 10 mL (has no administration in time range)   sodium chloride 0.9 % flush 10 mL (has no administration in time range)   aspirin chewable tablet 324 mg (0 mg Oral Hold 5/31/23 0553)   iopamidol (ISOVUE-370) 76 % injection 100 mL (100 mL Intravenous Given 5/31/23 0630)   iopamidol (ISOVUE-370) 76 % injection 100 mL (100 mL Intravenous Given 5/31/23 0632)        ED Course:         Labs:    Lab Results (last 24 hours)     Procedure Component Value Units Date/Time    CBC & Differential [[id number]]  (Abnormal) Collected: 05/31/23 0509    Specimen: Blood Updated: 05/31/23 0552    Narrative:      The following orders were created for panel order CBC & Differential.  Procedure                               Abnormality         Status                     ---------                               -----------         ------                     CBC Auto Differential[[id number]]        Abnormal            Final result               Scan Slide[[id number]]                                       Final result                 Please view results for these tests on the individual orders.    Comprehensive Metabolic Panel [[id number]]  (Abnormal) Collected: 05/31/23 0509    Specimen: Blood Updated: 05/31/23 0546     Glucose 165 mg/dL      BUN 9 mg/dL      Creatinine 0.61 mg/dL      Sodium 136 mmol/L      Potassium 3.9 mmol/L      Chloride 101 mmol/L      CO2 27.1 mmol/L      Calcium 8.7 mg/dL      Total Protein 7.0 g/dL      Albumin 3.2 g/dL      ALT (SGPT) 10 U/L      AST (SGOT) 25 U/L      Alkaline Phosphatase 113 U/L      Total Bilirubin 0.5 mg/dL      Globulin 3.8 gm/dL      A/G Ratio 0.8 g/dL      BUN/Creatinine Ratio 14.8     Anion Gap 7.9 mmol/L      eGFR 107.7 mL/min/1.73     Narrative:      GFR Normal >60  Chronic Kidney Disease <60  Kidney Failure <15      Magnesium [[id number]]  (Normal) Collected: 05/31/23 0509     Specimen: Blood Updated: 05/31/23 0546     Magnesium 1.9 mg/dL     Single High Sensitivity Troponin T [[id number]]  (Normal) Collected: 05/31/23 0509    Specimen: Blood Updated: 05/31/23 0546     HS Troponin T 8 ng/L     Narrative:      High Sensitive Troponin T Reference Range:  <10.0 ng/L- Negative Female for AMI  <15.0 ng/L- Negative Male for AMI  >=10 - Abnormal Female indicating possible myocardial injury.  >=15 - Abnormal Male indicating possible myocardial injury.   Clinicians would have to utilize clinical acumen, EKG, Troponin, and serial changes to determine if it is an Acute Myocardial Infarction or myocardial injury due to an underlying chronic condition.         CBC Auto Differential [[id number]]  (Abnormal) Collected: 05/31/23 0509    Specimen: Blood Updated: 05/31/23 0552     WBC 3.11 10*3/mm3      RBC 2.26 10*6/mm3      Hemoglobin 8.1 g/dL      Hematocrit 24.7 %      .3 fL      MCH 35.8 pg      MCHC 32.8 g/dL      RDW 15.6 %      RDW-SD 60.9 fl      MPV 9.9 fL      Platelets 98 10*3/mm3     High Sensitivity Troponin T [[id number]]  (Normal) Collected: 05/31/23 0509    Specimen: Blood Updated: 05/31/23 0546     HS Troponin T 8 ng/L     Narrative:      High Sensitive Troponin T Reference Range:  <10.0 ng/L- Negative Female for AMI  <15.0 ng/L- Negative Male for AMI  >=10 - Abnormal Female indicating possible myocardial injury.  >=15 - Abnormal Male indicating possible myocardial injury.   Clinicians would have to utilize clinical acumen, EKG, Troponin, and serial changes to determine if it is an Acute Myocardial Infarction or myocardial injury due to an underlying chronic condition.         Lipase [[id number]]  (Normal) Collected: 05/31/23 0509    Specimen: Blood Updated: 05/31/23 0546     Lipase 17 U/L     BNP [[id number]]  (Normal) Collected: 05/31/23 0509    Specimen: Blood Updated: 05/31/23 0542     proBNP 93.2 pg/mL     Narrative:      Among patients with dyspnea, NT-proBNP is highly sensitive  for the detection of acute congestive heart failure. In addition NT-proBNP of <300 pg/ml effectively rules out acute congestive heart failure with 99% negative predictive value.      Scan Slide [[id number]] Collected: 05/31/23 0509    Specimen: Blood Updated: 05/31/23 0552     Scan Slide --     Comment: See Manual Differential Results       Manual Differential [[id number]]  (Abnormal) Collected: 05/31/23 0509    Specimen: Blood Updated: 05/31/23 0552     Neutrophil % 52.0 %      Lymphocyte % 37.0 %      Monocyte % 4.0 %      Eosinophil % 1.0 %      Bands %  6.0 %      Neutrophils Absolute 1.80 10*3/mm3      Lymphocytes Absolute 1.15 10*3/mm3      Monocytes Absolute 0.12 10*3/mm3      Eosinophils Absolute 0.03 10*3/mm3      Anisocytosis Slight/1+     Hypochromia Slight/1+     Macrocytes Slight/1+     WBC Morphology Normal     Platelet Estimate Decreased    High Sensitivity Troponin T 2Hr [[id number]]  (Normal) Collected: 05/31/23 0655    Specimen: Blood Updated: 05/31/23 0719     HS Troponin T 8 ng/L      Troponin T Delta 0 ng/L     Narrative:      High Sensitive Troponin T Reference Range:  <10.0 ng/L- Negative Female for AMI  <15.0 ng/L- Negative Male for AMI  >=10 - Abnormal Female indicating possible myocardial injury.  >=15 - Abnormal Male indicating possible myocardial injury.   Clinicians would have to utilize clinical acumen, EKG, Troponin, and serial changes to determine if it is an Acute Myocardial Infarction or myocardial injury due to an underlying chronic condition.              EKG: Sinus rhythm with a rate of 96 bpm  Left atrial enlargement with normal IN interval  Normal QRS and normal axis  Normal ST segments normal QT QTc interval            Imaging:    CT Head Without Contrast    Result Date: 5/31/2023  PROCEDURE: CT HEAD WO CONTRAST  COMPARISONS: 5/31/2023; 3/10/2014.  INDICATIONS: MULTIPLE FALLS OVER THE PAST 3 DAYS; THE PT. DENIES HEAD INJURY & LOSS OF CONSCIOUSNESS.  PROTOCOL:   Standard CT  imaging protocol performed.    RADIATION:   Total DLP: 952.9 mGy*cm.   MA and/or KV were/was adjusted to minimize radiation dose.    TECHNIQUE: After obtaining the patient's consent, 123 CT images were obtained without non-ionic intravenous contrast material.  DISCUSSION:   A routine nonenhanced head CT was performed.  No acute brain abnormality is identified.  No acute intracranial hemorrhage.  No acute infarct is seen.  No acute skull fracture.  No midline shift or acute intracranial mass effect is seen.  The ventricular size and configuration are within normal limits.  Motion artifact obscures detail on the study.  The best possible images were obtained.  There is the suggestion of prior paranasal sinus surgery.  Chronic mucoperiosteal thickening is present, especially involving the left maxillary antrum.  Sinonasal polyposis is possible.  Please correlate clinically.  Except for minimal benign external auditory canal debris, the imaged middle ear clefts (that is, the bilateral mastoid air cell complexes, bilateral middle ear cavities, and bilateral external auditory canals) are well aerated.  There is suspected bilateral proptosis.  Please correlate with history of thyroid eye disease (DARRELL).  There is nonspecific diffuse prominence of the nasopharyngeal mucosal space.  It may be related to lymphoid hyperplasia.  There may be cerebellar tonsillar ectopia.  There is an incidental cavum velum interpositum, seen previously.  This finding is a normal variant.  Mild diffuse prominence of the lacrimal glands is noted, greater on the right than the left; please correlate clinically.  For instance, again, does the patient have a history of thyroid eye disease (DARRELL)?  Sarcoidosis is a possible etiology.  Other differential considerations such as infection as well as benign and/or malignant neoplastic etiologies would also be in the differential diagnosis as well as orbital pseudotumor (or idiopathic orbital inflammation).        No acute brain abnormality is seen. Specifically, no acute intracranial hemorrhage, no acute infarct, no intracranial mass lesion, and no acute intracranial mass effect are appreciated.  Please see above comments for further detail.   Please note that portions of this note were completed with a voice recognition program.  PARVEZ PIERCE JR, MD       Electronically Signed and Approved By: PARVEZ PIERCE JR, MD on 5/31/2023 at 6:41             CT Chest With Contrast Diagnostic    Result Date: 5/31/2023  PROCEDURE: CT CHEST W CONTRAST DIAGNOSTIC  COMPARISONS: 5/31/2023; 3/15/2022.  INDICATIONS: Dyspnea, beginning today.  TECHNIQUE: After obtaining the patient's consent, 905 CT/CTA images were obtained with non-ionic intravenous contrast material.   PROTOCOL:   Pulmonary embolism CTA imaging protocol performed.    RADIATION:   Total DLP: 557.3 mGy*cm.   Automated exposure control was utilized to minimize radiation dose. CONTRAST: 85 mL Isovue 370 I.V.  FINDINGS: The contrast bolus in the pulmonary arteries is limited.  The best possible technique was performed, considering the limitations on the study due to body habitus, motion artifact, and external densities within the scan field view.  No definite central (or proximal) pulmonary embolism is seen.  There is dilatation of main pulmonary artery, which measures bowel 4.1 cm and suggests pulmonary arterial hypertension.  Please correlate clinically.  There may be mild fusiform dilatation of the ascending aorta with a maximum diameter of 4 cm.  Previously, the maximum diameter of the ascending aorta was 3.9 cm.  The maximum diameter of the main pulmonary artery was 3.3 cm on the prior study from 3/15/2022.  There is mild-to-moderate cardiomegaly.  There may be a small amount of free fluid in the superior pericardial recesses.  A small pericardial effusion is suggested.  No definite pleural effusion.  The patient has undergone median sternotomy.  Please correlate  clinically.  There is a gastric band in place.  There is a small hiatal hernia.  There is diffuse hepatic steatosis with hepatomegaly.  Splenomegaly cannot be excluded.  The descending aorta is ectatic.  No thoracic aortic dissection is suggested.  There may be mild subsegmental atelectasis bilaterally.  No acute fracture or aggressive osseous lesion is suggested.  There are degenerative changes throughout the imaged spine.  There is lateral curvature of the upper thoracic spine with the convexity to the left, which may be fixed or positional in nature.  No pneumothorax is seen.        1. The study is limited.  2. No definite proximal (or central) pulmonary embolism is identified.  3. Evidence for pulmonary arterial hypertension is noted.  4. There is mild-to-moderate cardiomegaly.  5. The maximum diameter of the ascending aorta is about 4 cm, which is at the upper limits of normal.  Consider close interval clinical and imaging follow-up of this finding to ensure a benign progression and to exclude an expanding or developing fusiform aneurysm of the thoracic aorta.  6. A small amount of pericardial effusion is seen.  7. The patient has undergone median sternotomy.  8. There is suspected hepatosplenomegaly.  9. Subsegmental atelectasis involves the lungs.  Acute infiltrate is thought to be less likely.  10. Grossly, no suspicious pulmonary nodules are appreciated.  11. There may be mild levoscoliosis of the upper thoracic spine.  12. There is a gastric band in place.  13. There is debris in the upper thoracic esophageal lumen, which may be related to esophageal dysmotility, gastroesophageal reflux disease (GERD), and/or the gastric band, possibly being too constrictive. 14. Please see above comments for further detail.    Please note that portions of this note were completed with a voice recognition program.  PARVEZ PIERCE JR, MD       Electronically Signed and Approved By: PARVEZ PIERCE JR, MD on 5/31/2023 at 7:01               CT Cervical Spine Without Contrast    Result Date: 5/31/2023  PROCEDURE: CT CERVICAL SPINE WO CONTRAST  COMPARISON: 5/31/2023.  INDICATIONS: MULTIPLE FALLS OVER THE PAST 3 DAYS; MID POSTERIOR NECK PAIN.  PROTOCOL:   Standard CT imaging protocol performed.    RADIATION:   Total DLP: 1,426.3 mGy*cm   MA and/or KV were/was adjusted to minimize radiation dose.    TECHNIQUE: After obtaining the patient's consent, multi-planar CT images (379 CT images) were created without contrast material.  The study is motion-limited and habitus-limited.  EXAM FINDINGS: A routine nonenhanced cervical spine CT was performed. Sagittal and coronal two-dimensional reformations are provided for review. No acute cervical spine fracture or acute malalignment is identified. Small nonspecific bilateral cervical lymph nodes are seen.  There is mild nonspecific reversal of the cervical lordosis.  Mild degenerative changes are seen, especially at C6-7 and T1-2.  The patient has undergone median sternotomy.  There is the suggestion of prior paranasal sinus surgery, as well.  Chronic mucoperiosteal thickening is present, especially involving the left maxillary antrum.  Sinonasal polyposis is possible.  Please correlate clinically.  Except for minimal benign external auditory canal debris, the imaged middle ear clefts (that is, the bilateral mastoid air cell complexes, bilateral middle ear cavities, and bilateral external auditory canals) are well aerated.  There is mild lateral curvature of the cervical spine with the convexity to the right, which may be fixed or positional.  Intraluminal debris is suspected in the partially imaged upper thoracic esophagus and is nonspecific.  It may be related to gastroesophageal reflux disease (GERD).  Esophageal dysmotility is possible.  No pneumothorax is seen within the partially imaged upper thorax.  There is suspected bilateral proptosis.  Please correlate with history of thyroid eye disease  (DARRELL).  Neural foraminal narrowing is seen at several levels, greater overall on the left than the right.        No acute cervical spine fracture is seen.  No acute subluxation abnormality.    Please note that portions of this note were completed with a voice recognition program.  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  Please correlate clinically.    Please note that portions of this note were completed with a voice recognition program.  PARVEZ PIERCE JR, MD       Electronically Signed and Approved By: PARVEZ PIERCE JR, MD on 5/31/2023 at 7:40              Result Date: 5/31/2023  PROCEDURE: XR CHEST 1 VW  COMPARISON: 12/18/2022.  INDICATIONS: SYNCOPE, DIZZY.  FINDINGS:  A single AP (or PA) upright portable chest radiograph was performed.  Mild cardiac enlargement is seen, similar to the prior study.  The patient has undergone median sternotomy and CABG surgery.  No acute infiltrate is appreciated.  No pleural effusion or pneumothorax is identified.  There may be mild subsegmental atelectasis in the lung bases.  There is pulmonary hypoinflation.  Chronic calcified granulomatous disease involves the chest.  The thoracic aorta is atherosclerotic.  Probably no significant interval change is seen since the prior study (or studies).        No acute infiltrate is appreciated.    Please note that portions of this note were completed with a voice recognition program.  PARVEZ PIERCE JR, MD       Electronically Signed and Approved By: PARVEZ PIERCE JR, MD on 5/31/2023 at 6:10                  Differential Diagnosis and Discussion:    Syncope: Differential diagnosis includes but is not limited to TIA, hyperventilation, aortic  stenosis, pulmonary emboli, myocardial disease, bradycardia arrhythmia, heart block, tachyarrhythmia, vasovagal, orthostatic hypotension, ruptured AAA, aortic dissection, subarachnoid hemorrhage, seizure, hypoglycemia.    All labs were reviewed and interpreted by me.  EKG was interpreted by me.    MDM         Patient Care Considerations:    MRI: I considered ordering an MRI however CT was adequate for this patient's complaints      Consultants/Shared Management Plan:    Hospitalist: I have discussed the case with Hospitalist who agrees to accept the patient for admission.    Social Determinants of Health:    Patient is independent, reliable, and has access to care.       Disposition and Care Coordination:    Admit:   Through independent evaluation of the patient's history, physical, and imperical data, the patient meets criteria for observation/admission to the hospital.        Final diagnoses:   Syncope and collapse   Pancytopenia        ED Disposition     ED Disposition   Decision to Admit    Condition   --    Comment   Level of Care: Telemetry [5]   Diagnosis: Syncope and collapse [780.2.ICD-9-CM]   Admitting Physician: CAITLYN ARTHUR [[id number]]   Attending Physician: CAITLYN ARTHUR [[id number]]   Certification: I Certify That Inpatient Hospital Services Are Medically Necessary For Greater Than 2 Midnights               This medical record created using voice recognition software.           Lul Ma,   05/31/23 0931

## 2024-06-16 NOTE — DISCHARGE INSTRUCTIONS
As discussed with you today:    Call your eye surgeons office on Monday to update them on your symptoms.  Please let them know that we spoke with Dr. Anthony today who advised that you to call to update on your symptoms and if needed schedule an appointment this coming week for reevaluation.    Meanwhile use the prescription eyedrop medications as provided and discussed with you today.    Return with any facial swelling, difficulty breathing, severe eye pain, worsening symptoms or any new concerns.

## 2024-06-16 NOTE — ED NOTES
Per ANGELA Del Toro at [facility], \"patient expressed suicidal ideations to another patient with a plan to drink alcohol and consum pills. I have found a large bag of unlabeled white pills in patient's bathroom\". Nurse is concerned with mental state and requesting psych eval.     Contact info [phone]

## 2024-06-17 ENCOUNTER — HOSPITAL ENCOUNTER (EMERGENCY)
Age: 44
Discharge: HOME | End: 2024-06-17
Attending: STUDENT IN AN ORGANIZED HEALTH CARE EDUCATION/TRAINING PROGRAM
Payer: COMMERCIAL

## 2024-06-17 ENCOUNTER — APPOINTMENT (OUTPATIENT)
Dept: CT IMAGING | Age: 44
End: 2024-06-17
Payer: COMMERCIAL

## 2024-06-17 VITALS
HEART RATE: 94 BPM | WEIGHT: 170 LBS | TEMPERATURE: 98.6 F | DIASTOLIC BLOOD PRESSURE: 63 MMHG | BODY MASS INDEX: 23.03 KG/M2 | SYSTOLIC BLOOD PRESSURE: 110 MMHG | OXYGEN SATURATION: 98 % | RESPIRATION RATE: 17 BRPM | HEIGHT: 72 IN

## 2024-06-17 DIAGNOSIS — N40.0 ENLARGED PROSTATE: ICD-10-CM

## 2024-06-17 DIAGNOSIS — R10.32 ABDOMINAL PAIN, LEFT LOWER QUADRANT: Primary | ICD-10-CM

## 2024-06-17 LAB
ALBUMIN SERPL-MCNC: 4.8 GM/DL (ref 3.4–5)
ALP BLD-CCNC: 86 IU/L (ref 40–128)
ALT SERPL-CCNC: 26 U/L (ref 10–40)
ANION GAP SERPL CALCULATED.3IONS-SCNC: 16 MMOL/L (ref 7–16)
AST SERPL-CCNC: 24 IU/L (ref 15–37)
BASOPHILS ABSOLUTE: 0.1 K/CU MM
BASOPHILS RELATIVE PERCENT: 0.4 % (ref 0–1)
BILIRUB SERPL-MCNC: 0.4 MG/DL (ref 0–1)
BUN SERPL-MCNC: 13 MG/DL (ref 6–23)
CALCIUM SERPL-MCNC: 9.6 MG/DL (ref 8.3–10.6)
CHLORIDE BLD-SCNC: 100 MMOL/L (ref 99–110)
CO2: 20 MMOL/L (ref 21–32)
CREAT SERPL-MCNC: 1 MG/DL (ref 0.9–1.3)
DIFFERENTIAL TYPE: ABNORMAL
EOSINOPHILS ABSOLUTE: 0.1 K/CU MM
EOSINOPHILS RELATIVE PERCENT: 0.4 % (ref 0–3)
GFR, ESTIMATED: >90 ML/MIN/1.73M2
GLUCOSE SERPL-MCNC: 222 MG/DL (ref 70–99)
HCT VFR BLD CALC: 42.1 % (ref 42–52)
HEMOGLOBIN: 14.2 GM/DL (ref 13.5–18)
IMMATURE NEUTROPHIL %: 0.2 % (ref 0–0.43)
LIPASE: 29 IU/L (ref 13–60)
LYMPHOCYTES ABSOLUTE: 1.7 K/CU MM
LYMPHOCYTES RELATIVE PERCENT: 12.5 % (ref 24–44)
MCH RBC QN AUTO: 29.1 PG (ref 27–31)
MCHC RBC AUTO-ENTMCNC: 33.7 % (ref 32–36)
MCV RBC AUTO: 86.3 FL (ref 78–100)
MONOCYTES ABSOLUTE: 0.6 K/CU MM
MONOCYTES RELATIVE PERCENT: 4.2 % (ref 0–4)
NEUTROPHILS ABSOLUTE: 11.1 K/CU MM
NEUTROPHILS RELATIVE PERCENT: 82.3 % (ref 36–66)
NUCLEATED RBC %: 0 %
PDW BLD-RTO: 14.1 % (ref 11.7–14.9)
PLATELET # BLD: 415 K/CU MM (ref 140–440)
PMV BLD AUTO: 9.5 FL (ref 7.5–11.1)
POTASSIUM SERPL-SCNC: 3.4 MMOL/L (ref 3.5–5.1)
RBC # BLD: 4.88 M/CU MM (ref 4.6–6.2)
SODIUM BLD-SCNC: 136 MMOL/L (ref 135–145)
TOTAL IMMATURE NEUTOROPHIL: 0.03 K/CU MM
TOTAL NUCLEATED RBC: 0 K/CU MM
TOTAL PROTEIN: 9.5 GM/DL (ref 6.4–8.2)
WBC # BLD: 13.4 K/CU MM (ref 4–10.5)

## 2024-06-17 PROCEDURE — 6360000004 HC RX CONTRAST MEDICATION: Performed by: STUDENT IN AN ORGANIZED HEALTH CARE EDUCATION/TRAINING PROGRAM

## 2024-06-17 PROCEDURE — 99285 EMERGENCY DEPT VISIT HI MDM: CPT

## 2024-06-17 PROCEDURE — 6370000000 HC RX 637 (ALT 250 FOR IP): Performed by: STUDENT IN AN ORGANIZED HEALTH CARE EDUCATION/TRAINING PROGRAM

## 2024-06-17 PROCEDURE — 80053 COMPREHEN METABOLIC PANEL: CPT

## 2024-06-17 PROCEDURE — 6360000002 HC RX W HCPCS: Performed by: STUDENT IN AN ORGANIZED HEALTH CARE EDUCATION/TRAINING PROGRAM

## 2024-06-17 PROCEDURE — 85025 COMPLETE CBC W/AUTO DIFF WBC: CPT

## 2024-06-17 PROCEDURE — 74177 CT ABD & PELVIS W/CONTRAST: CPT

## 2024-06-17 PROCEDURE — 83690 ASSAY OF LIPASE: CPT

## 2024-06-17 PROCEDURE — 96374 THER/PROPH/DIAG INJ IV PUSH: CPT

## 2024-06-17 RX ORDER — DROPERIDOL 2.5 MG/ML
0.62 INJECTION, SOLUTION INTRAMUSCULAR; INTRAVENOUS ONCE
Status: COMPLETED | OUTPATIENT
Start: 2024-06-17 | End: 2024-06-17

## 2024-06-17 RX ADMIN — POTASSIUM BICARBONATE 20 MEQ: 782 TABLET, EFFERVESCENT ORAL at 04:27

## 2024-06-17 RX ADMIN — IOPAMIDOL 75 ML: 755 INJECTION, SOLUTION INTRAVENOUS at 04:18

## 2024-06-17 RX ADMIN — DROPERIDOL 0.62 MG: 2.5 INJECTION, SOLUTION INTRAMUSCULAR; INTRAVENOUS at 04:27

## 2024-06-17 ASSESSMENT — PAIN DESCRIPTION - ORIENTATION: ORIENTATION: LEFT;LOWER

## 2024-06-17 ASSESSMENT — PAIN - FUNCTIONAL ASSESSMENT: PAIN_FUNCTIONAL_ASSESSMENT: 0-10

## 2024-06-17 ASSESSMENT — PAIN DESCRIPTION - LOCATION: LOCATION: ABDOMEN

## 2024-06-17 ASSESSMENT — PAIN SCALES - GENERAL: PAINLEVEL_OUTOF10: 8

## 2024-06-17 NOTE — ED NOTES
[facility] here to transport patient to [facility]. Patient transferred to stretcher and left facility without incident.

## 2024-06-17 NOTE — ED PROVIDER NOTES
tablet 1    Dulaglutide 4.5 MG/0.5ML SOPN Inject 4.5 mg into the skin once a week (Patient not taking: Reported on 5/2/2024) 4 Adjustable Dose Pre-filled Pen Syringe 2    carvedilol (COREG) 12.5 MG tablet Take 1 tablet by mouth 2 times daily (with meals) 180 tablet 0    amLODIPine (NORVASC) 2.5 MG tablet Take 1 tablet by mouth daily 30 tablet 3    prazosin (MINIPRESS) 1 MG capsule Take 1 capsule by mouth nightly 30 capsule 3    losartan (COZAAR) 100 MG tablet Take 1 tablet by mouth daily (Patient not taking: Reported on 5/23/2024) 30 tablet 3    clopidogrel (PLAVIX) 75 MG tablet Take 1 tablet by mouth daily (Patient not taking: Reported on 5/28/2024) 30 tablet 3    atorvastatin (LIPITOR) 40 MG tablet Take 1 tablet by mouth nightly 30 tablet 3    pantoprazole (PROTONIX) 40 MG tablet Take 1 tablet by mouth every morning (before breakfast) (Patient not taking: Reported on 5/23/2024) 30 tablet 3    ondansetron (ZOFRAN) 4 MG tablet Take 1 tablet by mouth daily as needed for Nausea or Vomiting 30 tablet 0    Insulin Pen Needle ([employer] PEN NEEDLES) 31G X 6 MM MISC 1 each by Does not apply route daily 100 each 3    blood glucose monitor strips Test 2 times a day 100 strip 5    glucose monitoring (FREESTYLE FREEDOM) kit 1 kit by Does not apply route daily 1 kit 0    Lancets MISC 1 each by Does not apply route daily 100 each 3    aspirin 81 MG chewable tablet Take 1 tablet by mouth daily Take 81 mg by mouth daily 30 tablet 2    docusate sodium (COLACE, DULCOLAX) 100 MG CAPS Take 100 mg by mouth daily (Patient not taking: Reported on 5/23/2024)      Alcohol Swabs PADS        No Known Allergies    Nursing Notes Reviewed    Physical Exam:  Triage VS:    ED Triage Vitals [06/17/24 0223]   Enc Vitals Group      BP (!) 152/87      Pulse 89      Respirations 19      Temp 98.6 °F (37 °C)      Temp Source Oral      SpO2 92 %      Weight - Scale 77.1 kg (170 lb)      Height 1.829 m (6')      Head Circumference       Peak Flow

## 2024-06-17 NOTE — DISCHARGE INSTRUCTIONS
-Take Tylenol (1000 mg, every 6-8 hours) and/or ibuprofen (600 mg, every 6-8 hours) as needed for pain  -Follow-up with your primary care doctor for an enlarged prostate, see Dr. Andrade if you need a new one  -Follow-up with your GI doctor for your chronic abdominal pain, go see Dr. Cornell  -Come back to emergency department if develop severe vomiting, severe diarrhea, severe pain, or if you are concerned

## 2024-08-08 NOTE — PROGRESS NOTES
Patient came in today for a nurse bp check. BP was 132/82 p 98.
Detail Level: Zone
Add High Risk Medication Management Associated Diagnosis?: No

## 2024-11-13 ENCOUNTER — HOSPITAL ENCOUNTER (EMERGENCY)
Age: 44
Discharge: PSYCHIATRIC OR SUBSTANCE USE FACILITY | End: 2024-11-14
Attending: EMERGENCY MEDICINE
Payer: COMMERCIAL

## 2024-11-13 DIAGNOSIS — F33.9 RECURRENT MAJOR DEPRESSIVE DISORDER, REMISSION STATUS UNSPECIFIED (HCC): Primary | ICD-10-CM

## 2024-11-13 DIAGNOSIS — R45.851 SUICIDAL IDEATION: ICD-10-CM

## 2024-11-13 LAB
ALBUMIN SERPL-MCNC: 3.8 G/DL (ref 3.4–5)
ALBUMIN/GLOB SERPL: 0.7 {RATIO} (ref 1.1–2.2)
ALP SERPL-CCNC: 96 U/L (ref 40–129)
ALT SERPL-CCNC: 12 U/L (ref 10–40)
ANION GAP SERPL CALCULATED.3IONS-SCNC: 14 MMOL/L (ref 9–17)
APAP SERPL-MCNC: <5 UG/ML (ref 10–30)
AST SERPL-CCNC: 24 U/L (ref 15–37)
BASOPHILS # BLD: 0.04 K/UL
BASOPHILS NFR BLD: 0 % (ref 0–1)
BILIRUB SERPL-MCNC: 0.3 MG/DL (ref 0–1)
BUN SERPL-MCNC: 18 MG/DL (ref 7–20)
CALCIUM SERPL-MCNC: 9.3 MG/DL (ref 8.3–10.6)
CHLORIDE SERPL-SCNC: 101 MMOL/L (ref 99–110)
CO2 SERPL-SCNC: 21 MMOL/L (ref 21–32)
CREAT SERPL-MCNC: 1.1 MG/DL (ref 0.9–1.3)
EOSINOPHIL # BLD: 0.13 K/UL
EOSINOPHILS RELATIVE PERCENT: 1 % (ref 0–3)
ERYTHROCYTE [DISTWIDTH] IN BLOOD BY AUTOMATED COUNT: 13.4 % (ref 11.7–14.9)
ETHANOLAMINE SERPL-MCNC: <10 MG/DL (ref 0–0.08)
GFR, ESTIMATED: 73 ML/MIN/1.73M2
GLUCOSE SERPL-MCNC: 108 MG/DL (ref 74–99)
HCT VFR BLD AUTO: 40.8 % (ref 42–52)
HGB BLD-MCNC: 13.1 G/DL (ref 13.5–18)
IMM GRANULOCYTES # BLD AUTO: 0.03 K/UL
IMM GRANULOCYTES NFR BLD: 0 %
LYMPHOCYTES NFR BLD: 1.94 K/UL
LYMPHOCYTES RELATIVE PERCENT: 19 % (ref 24–44)
MCH RBC QN AUTO: 28.4 PG (ref 27–31)
MCHC RBC AUTO-ENTMCNC: 32.1 G/DL (ref 32–36)
MCV RBC AUTO: 88.3 FL (ref 78–100)
MONOCYTES NFR BLD: 0.39 K/UL
MONOCYTES NFR BLD: 4 % (ref 0–4)
NEUTROPHILS NFR BLD: 75 % (ref 36–66)
NEUTS SEG NFR BLD: 7.65 K/UL
PLATELET # BLD AUTO: 734 K/UL (ref 140–440)
PMV BLD AUTO: 8.6 FL (ref 7.5–11.1)
POTASSIUM SERPL-SCNC: 4.3 MMOL/L (ref 3.5–5.1)
PROT SERPL-MCNC: 9.5 G/DL (ref 6.4–8.2)
RBC # BLD AUTO: 4.62 M/UL (ref 4.6–6.2)
SALICYLATES SERPL-MCNC: <0.5 MG/DL (ref 15–30)
SODIUM SERPL-SCNC: 136 MMOL/L (ref 136–145)
WBC OTHER # BLD: 10.2 K/UL (ref 4–10.5)

## 2024-11-13 PROCEDURE — 80179 DRUG ASSAY SALICYLATE: CPT

## 2024-11-13 PROCEDURE — G0480 DRUG TEST DEF 1-7 CLASSES: HCPCS

## 2024-11-13 PROCEDURE — 6370000000 HC RX 637 (ALT 250 FOR IP): Performed by: NURSE PRACTITIONER

## 2024-11-13 PROCEDURE — 99285 EMERGENCY DEPT VISIT HI MDM: CPT

## 2024-11-13 PROCEDURE — 80053 COMPREHEN METABOLIC PANEL: CPT

## 2024-11-13 PROCEDURE — 80143 DRUG ASSAY ACETAMINOPHEN: CPT

## 2024-11-13 PROCEDURE — 85025 COMPLETE CBC W/AUTO DIFF WBC: CPT

## 2024-11-13 PROCEDURE — 90792 PSYCH DIAG EVAL W/MED SRVCS: CPT | Performed by: REGISTERED NURSE

## 2024-11-13 RX ORDER — TRAZODONE HYDROCHLORIDE 50 MG/1
100 TABLET, FILM COATED ORAL NIGHTLY
Status: DISCONTINUED | OUTPATIENT
Start: 2024-11-13 | End: 2024-11-14 | Stop reason: HOSPADM

## 2024-11-13 RX ADMIN — TRAZODONE HYDROCHLORIDE 100 MG: 50 TABLET ORAL at 21:42

## 2024-11-13 ASSESSMENT — LIFESTYLE VARIABLES
HOW MANY STANDARD DRINKS CONTAINING ALCOHOL DO YOU HAVE ON A TYPICAL DAY: PATIENT DOES NOT DRINK
HOW OFTEN DO YOU HAVE A DRINK CONTAINING ALCOHOL: NEVER

## 2024-11-13 ASSESSMENT — ENCOUNTER SYMPTOMS
SHORTNESS OF BREATH: 0
ABDOMINAL PAIN: 0

## 2024-11-13 ASSESSMENT — PAIN - FUNCTIONAL ASSESSMENT: PAIN_FUNCTIONAL_ASSESSMENT: NONE - DENIES PAIN

## 2024-11-13 NOTE — ED PROVIDER NOTES
losartan (COZAAR) 100 MG tablet Take 1 tablet by mouth daily, Disp-30 tablet, R-3FOR DELIVERYNormal      clopidogrel (PLAVIX) 75 MG tablet Take 1 tablet by mouth daily, Disp-30 tablet, R-3FOR DELIVERYNormal      atorvastatin (LIPITOR) 40 MG tablet Take 1 tablet by mouth nightly, Disp-30 tablet, R-3FOR DELIVERYNormal      pantoprazole (PROTONIX) 40 MG tablet Take 1 tablet by mouth every morning (before breakfast), Disp-30 tablet, R-3FOR DELIVERYNormal      ondansetron (ZOFRAN) 4 MG tablet Take 1 tablet by mouth daily as needed for Nausea or Vomiting, Disp-30 tablet, R-0FOR DELIVERYNormal      Insulin Pen Needle ([employer] PEN NEEDLES) 31G X 6 MM MISC DAILY Starting Thu 5/4/2023, Disp-100 each, R-3, Normal      blood glucose monitor strips Test 2 times a day, Disp-100 strip, R-5, Normal      glucose monitoring (FREESTYLE FREEDOM) kit DAILY Starting Mon 4/24/2023, Disp-1 kit, R-0, Normal      Lancets MISC DAILY Starting Mon 4/24/2023, Disp-100 each, R-3, Normal      aspirin 81 MG chewable tablet Take 1 tablet by mouth daily Take 81 mg by mouth daily, Disp-30 tablet, R-2Normal      docusate sodium (COLACE, DULCOLAX) 100 MG CAPS Take 100 mg by mouth dailyOTC      Alcohol Swabs PADS Starting Wed 4/27/2022, Historical Med       !! - Potential duplicate medications found. Please discuss with provider.          ALLERGIES     Patient has no known allergies.    FAMILYHISTORY       Family History   Problem Relation Age of Onset    Heart Disease Father     Diabetes Father     Diabetes Mother     Heart Disease Mother     Other Mother     Kidney Disease Mother     Heart Attack Mother         SOCIAL HISTORY       Social History     Tobacco Use    Smoking status: Never    Smokeless tobacco: Never   Vaping Use    Vaping status: Never Used   Substance Use Topics    Alcohol use: Yes     Comment: occasionally    Drug use: No       SCREENINGS          Fowler Coma Scale  Eye Opening: Spontaneous  Best Verbal Response: Oriented  Best Motor  pain at this time.    Is this patient to be included in the SEP-1 Core Measure due to severe sepsis or septic shock?   No   Exclusion criteria - the patient is NOT to be included for SEP-1 Core Measure due to:  2+ SIRS criteria are not met     Patient was treated the following medications:  Medications - No data to display      ED Course as of 11/17/24 1125   Wed Nov 13, 2024 2004 WBC: 10.2 [SH]   2004 Hemoglobin Quant(!): 13.1 [SH]   2004 Hematocrit(!): 40.8 [SH]   2004 Sodium: 136 [SH]   2004 Potassium: 4.3 [SH]   2004 BUN,BUNPL: 18 [SH]   2004 Creatinine: 1.1 [SH]   2004 Alkaline Phosphatase: 96 [SH]   2004 ALT: 12 [SH]   2004 AST: 24 [SH]   2004 Acetaminophen Level(!): <5 [SH]   2004 Ethanol Lvl: <10 [SH]   2004 Salicyclic Acid(!): <0.5 [SH]      ED Course User Index  [SH] Tammy Bonner, RAFAEL - CNP     He did not exhibit any aggression or agitation.  In fact, he has been nothing but polite and cooperative.  The patient did not require any any physical restraints.  Exam benign.  The patient is medically asked stable.  No signs and symptoms to suggest metabolic abnormality.  The patient is clinically cleared and medically stable for evaluation by psychiatric services.  Initial evaluation by social work was that the patient would be discharged home however when the  called the facility they mentioned that they did not have the staff at the facility to watch the patient tonight and were hoping that the patient could stay in the emergency department until the morning.  Advised  that it was not appropriate to hold this patient who was currently not suicidal or homicidal because of staffing issues at the facility.  The  was in agreement with this and did recommend discharge.  However I was notified a short time later that there was going to be a second telepsych assessment performed by the psych NP.  She also seemed on the fence as to recommend discharge or placement.

## 2024-11-13 NOTE — ED TRIAGE NOTES
Pt arrived to ED via EMS from [facility]. Pt friend committed suicide today and pt expressed feelings of guilt and sadness. Denies SI/HI. States he has had an attempt in the past that was over 5 months ago. A/o and on room air. [place] slipped by facility. Pt and EMS stating he came voluntarily to ED.

## 2024-11-14 VITALS
DIASTOLIC BLOOD PRESSURE: 98 MMHG | HEIGHT: 72 IN | BODY MASS INDEX: 20.32 KG/M2 | SYSTOLIC BLOOD PRESSURE: 131 MMHG | HEART RATE: 90 BPM | OXYGEN SATURATION: 99 % | TEMPERATURE: 98.8 F | RESPIRATION RATE: 16 BRPM | WEIGHT: 150 LBS

## 2024-11-14 LAB
B PARAP IS1001 DNA NPH QL NAA+NON-PROBE: NOT DETECTED
B PERT DNA SPEC QL NAA+PROBE: NOT DETECTED
C PNEUM DNA NPH QL NAA+NON-PROBE: NOT DETECTED
FLUAV RNA NPH QL NAA+NON-PROBE: NOT DETECTED
FLUBV RNA NPH QL NAA+NON-PROBE: NOT DETECTED
HADV DNA NPH QL NAA+NON-PROBE: NOT DETECTED
HCOV 229E RNA NPH QL NAA+NON-PROBE: NOT DETECTED
HCOV HKU1 RNA NPH QL NAA+NON-PROBE: NOT DETECTED
HCOV NL63 RNA NPH QL NAA+NON-PROBE: NOT DETECTED
HCOV OC43 RNA NPH QL NAA+NON-PROBE: NOT DETECTED
HMPV RNA NPH QL NAA+NON-PROBE: NOT DETECTED
HPIV1 RNA NPH QL NAA+NON-PROBE: NOT DETECTED
HPIV2 RNA NPH QL NAA+NON-PROBE: NOT DETECTED
HPIV3 RNA NPH QL NAA+NON-PROBE: NOT DETECTED
HPIV4 RNA NPH QL NAA+NON-PROBE: NOT DETECTED
M PNEUMO DNA NPH QL NAA+NON-PROBE: NOT DETECTED
RSV RNA NPH QL NAA+NON-PROBE: NOT DETECTED
RV+EV RNA NPH QL NAA+NON-PROBE: NOT DETECTED
SARS-COV-2 RNA NPH QL NAA+NON-PROBE: NOT DETECTED
SPECIMEN DESCRIPTION: NORMAL

## 2024-11-14 PROCEDURE — 0202U NFCT DS 22 TRGT SARS-COV-2: CPT

## 2024-11-14 NOTE — VIRTUAL HEALTH
Julien Howard  [id number]  1980     Social Work Behavioral Health Crisis Assessment    11/13/24    Chief Complaint: \"My best friend killed himself. \"    HPI: Patient is a 44 y.o. White (non-) male who presents for Risk to self . Patient presented to the ED on 11/13/24 from nursing facility.     Per chart review, Julien reports that his best friend who also resides at that facility killed himself this morning after he shot himself. Julien states that he had told the nursing facility that his friend had a gun in his car and had stated that he was going to kill himself. Julien further states that after he found out that his friend at killed himself he was sad felt guilty like he could have done something else to help save him and was crying. He intermittently denies any suicidal or homicidal ideation. Mitesh jones reports that the patient jorden Fatima has waxed and waned today about suicidal ideations stating \"he would be next therefore he was sent to the emergency department for psychiatric evaluation.       Past Psychiatric History:  Previous Diagnoses/symptoms: depression   Previous suicide attempts/self-harm: Denies  Inpatient psychiatric hospitalizations: no  Current outpatient psychiatric provider: Denies  Current therapist: therapist comes into facility  Previous psychiatric medication trials: No prior medication trials  Current psychiatric medications: taking medication   Family Psychiatric History: Denies    Sleep Hours: 6-8    Sleep concerns: denies    Use of sleep medications: denies    Substance Abuse History:  Tobacco: Denies  Alcohol: Denies  Marijuana: Denies  Stimulant: Denies  Opiates: Denies  Benzodiazepine: Denies  Other illicit drug usage: Denies  History of substance/alcohol abuse treatment: Denies    Social History:  Education: H.S.  Living Situation/Interest: lives in an apartment facility  Marital/Committed relationship and parenting hx: single  Occupation: Unemployed  Legal History/Hx of 
further than previously made statements.    This writer spoke with attending provider Tammy in regards to psychiatric recommendations and though the complexity of the case is challenging recommendations are made to err on the side of caution and protection in relation to the patient and significant safety concerns.    Dx:   Major depressive disorder severe recurrent without psychotic features    Plan:  Inpatient psychiatric admission at appropriate care level facility, once medically cleared and stable  Legal Status: INVOLUNTARY.  Patient is  risk of harm to self .   Sitter, suicide and elopement precautions.  Medical co-morbidities: Management per medical providers, appreciate assistance.  Medication Recommendations: Ativan 0.5 mg PO q 4 PRN agitation and Trazodone 100mg QHS one time dose  Reviewed treatment plan with patient including risks, benefits, alternatives, and side effects of medications, and any/all black box warnings. Patient verbalized understanding.  Patient had an opportunity to ask questions and address concerns. Obtained informed consent for treatment.  Medical records, labs, and diagnostic tests reviewed.   Re-consult for any new changes or concerns. Thank you for this consult.  Discussed recommendations with Tammy  at time of consult completion.    TelePsych recommendations:Inpatient psychiatric admission    Legal hold: Maintain Involuntary Hold    Telepsychiatry will sign off. Thank you for allowing us to participate in the care of this patient. Please send message or call via [facility]! if anything more is required.     Electronically signed by RAFAEL Wang CNP on 11/13/2024 at 9:41 PM.    END OF NOTE  -------------------------      Julien Howard, was evaluated through a synchronous (real-time) audio-video encounter. The patient (and/or guardian if applicable) is aware that this is a billable service, which includes applicable co-pays. This virtual visit was conducted with

## 2024-11-14 NOTE — ED NOTES
Transfer Center Handoff for Behavioral Health Transfers      Patient's Current Location: [facility] EMERGENCY DEPARTMENT     Chief Complaint   Patient presents with    Mental Health Problem     Evaluation. Pt volunteered to come from [place]. Denies HI/SI. Friend passed away today.        Current or History of Violent Behavior: No    Currently in Restraints Now or During this Encounter: No  (Specify if Agitation or self harm is noted in ED?)  If yes, please describe behaviors requiring restraint:             Medical Clearance Documented and Verified in the Chart: Yes    No Known Allergies     Can Patient Tolerate Lying Flat: Yes    Able to Perform ADLs:  Yes  (Specify if able to ambulate or uses any mobility devices such as cane or walker)  Activity:    Level of Assistance:    Assistive Device:    Miscellaneous Devices:      LABS    CBC:   Lab Results   Component Value Date/Time    WBC 10.2 11/13/2024 06:28 PM    RBC 4.62 11/13/2024 06:28 PM    HGB 13.1 11/13/2024 06:28 PM    HCT 40.8 11/13/2024 06:28 PM    MCV 88.3 11/13/2024 06:28 PM    MCH 28.4 11/13/2024 06:28 PM    MCHC 32.1 11/13/2024 06:28 PM    RDW 13.4 11/13/2024 06:28 PM     11/13/2024 06:28 PM    MPV 8.6 11/13/2024 06:28 PM     CMP:   Lab Results   Component Value Date/Time     11/13/2024 06:28 PM    K 4.3 11/13/2024 06:28 PM    K 3.8 03/14/2018 06:01 AM     11/13/2024 06:28 PM    CO2 21 11/13/2024 06:28 PM    BUN 18 11/13/2024 06:28 PM    CREATININE 1.1 11/13/2024 06:28 PM    GFRAA >60 10/11/2022 01:45 AM    AGRATIO 1.0 01/11/2023 09:08 AM    LABGLOM 73 11/13/2024 06:28 PM    LABGLOM >60 04/24/2023 09:47 AM    GLUCOSE 108 11/13/2024 06:28 PM    CALCIUM 9.3 11/13/2024 06:28 PM    BILITOT 0.3 11/13/2024 06:28 PM    ALKPHOS 96 11/13/2024 06:28 PM    AST 24 11/13/2024 06:28 PM    ALT 12 11/13/2024 06:28 PM     Drug Panel:   Lab Results   Component Value Date/Time    OPIAU NEGATIVE 11/04/2022 01:42

## 2024-11-14 NOTE — ED PROVIDER NOTES
Emergency Department Encounter    Patient: Julien Howard  MRN: [id number]  : 1980  Date of Evaluation: 2024  ED Provider:  Rachel Sims MD    Briefly, Julien Howard is a 44 y.o. male presented to the emergency department for psychiatric evaluation.  He is experienced the recent death of a friend who  by suicide.  He presents pink slipped    I have reviewed and interpreted all of the currently available lab results from this visit (if applicable)  Results for orders placed or performed during the hospital encounter of 24   CBC with Auto Differential   Result Value Ref Range    WBC 10.2 4.0 - 10.5 k/uL    RBC 4.62 4.60 - 6.20 m/uL    Hemoglobin 13.1 (L) 13.5 - 18.0 g/dL    Hematocrit 40.8 (L) 42.0 - 52.0 %    MCV 88.3 78.0 - 100.0 fL    MCH 28.4 27.0 - 31.0 pg    MCHC 32.1 32.0 - 36.0 g/dL    RDW 13.4 11.7 - 14.9 %    Platelets 734 (H) 140 - 440 k/uL    MPV 8.6 7.5 - 11.1 fL    Neutrophils % 75 (H) 36 - 66 %    Lymphocytes % 19 (L) 24 - 44 %    Monocytes % 4 0 - 4 %    Eosinophils % 1 0.0 - 3.0 %    Basophils % 0 0 - 1 %    Immature Granulocytes % 0 0 %    Neutrophils Absolute 7.65 k/uL    Lymphocytes Absolute 1.94 k/uL    Monocytes Absolute 0.39 k/uL    Eosinophils Absolute 0.13 k/uL    Basophils Absolute 0.04 k/uL    Immature Granulocytes Absolute 0.03 k/uL   Comprehensive Metabolic Panel   Result Value Ref Range    Sodium 136 136 - 145 mmol/L    Potassium 4.3 3.5 - 5.1 mmol/L    Chloride 101 99 - 110 mmol/L    CO2 21 21 - 32 mmol/L    Anion Gap 14 9 - 17 mmol/L    Glucose 108 (H) 74 - 99 mg/dL    BUN 18 7 - 20 mg/dL    Creatinine 1.1 0.9 - 1.3 mg/dL    Est, Glom Filt Rate 73 >60 mL/min/1.73m2    Calcium 9.3 8.3 - 10.6 mg/dL    Total Protein 9.5 (H) 6.4 - 8.2 g/dL    Albumin 3.8 3.4 - 5.0 g/dL    Albumin/Globulin Ratio 0.7 (L) 1.1 - 2.2    Total Bilirubin 0.3 0.0 - 1.0 mg/dL    Alkaline Phosphatase 96 40 - 129 U/L    ALT 12 10 - 40 U/L    AST 24 15 - 37 U/L   Acetaminophen Level

## 2024-11-14 NOTE — ED NOTES
PT accepted to [facility]    Accepting provider is Jessi Lazo     [facility] is [phone], opt 1    Room # Adult BH unit    Transport time is  [facility]

## 2024-11-14 NOTE — ED NOTES
Report given to [facility] crew    All forms (pink slip, CMN, copies of all chart/demographics) given to EMS

## 2024-11-14 NOTE — ED NOTES
Resting in bed with eyes closed  NAD noted.   Skin w/d oral mucosa moist pink lips nailbeds pink brisk crf, resp easy unlabored with symmetrical rise and fall of chest wall.       Call light within reach, bed in low position,   Sitter at bedside.

## 2024-11-14 NOTE — ED NOTES
Assumed pt care at this time.     Resting in bed with eyes closed   NAD noted.   Skin w/d oral mucosa moist pink lips nailbeds pink brisk crf, resp easy unlabored with symmetrical rise and fall of chest wall.     Sitter at bedside.

## 2025-01-31 NOTE — ED NOTES
Patient is resting with eyes open. Patient has sitter at bedside. Patient is here for SI thoughts. Will continue to monitor.       Rudi Navarro RN  10/11/22 [id number] 30-Jan-2025 21:00

## (undated) DEVICE — CODMAN® SURGICAL PATTIES 1/2" X 1/2" (1.27CM X 1.27CM): Brand: CODMAN®

## (undated) DEVICE — GLOVE SURG SZ 65 CRM LTX FREE POLYISOPRENE POLYMER BEAD ANTI

## (undated) DEVICE — BANDAGE,SELF ADHRNT,COFLEX,4"X5YD,STRL: Brand: COLABEL

## (undated) DEVICE — SYRINGE MED 20ML STD CLR PLAS LUERLOCK TIP N CTRL DISP

## (undated) DEVICE — PAD,ABDOMINAL,5"X9",ST,LF,25/BX: Brand: MEDLINE INDUSTRIES, INC.

## (undated) DEVICE — COVER,C-ARM,41X74: Brand: MEDLINE

## (undated) DEVICE — SUTURE PERMAHAND SZ 2-0 L17X18IN NONABSORBABLE BLK SILK SA65H

## (undated) DEVICE — SPONGE LAP W18XL18IN WHT COT 4 PLY FLD STRUNG RADPQ DISP ST

## (undated) DEVICE — SPONGE GZ W4XL8IN COT WVN 12 PLY

## (undated) DEVICE — SUTURE VCRL SZ 3-0 L27IN ABSRB UD L26MM SH 1/2 CIR J416H

## (undated) DEVICE — DRAPE,EXTREMITY,89X128,STERILE: Brand: MEDLINE

## (undated) DEVICE — COUNTER NDL 30 COUNT FOAM STRP SGL MAG

## (undated) DEVICE — ELECTRODE ES AD CRDLSS PT RET REM POLYHESIVE

## (undated) DEVICE — DRAPE SHEET ULTRAGARD: Brand: MEDLINE

## (undated) DEVICE — TRAY PREP DRY W/ PREM GLV 2 APPL 6 SPNG 2 UNDPD 1 OVERWRAP

## (undated) DEVICE — 500ML,PRESSURE INFUSER W/STOPCOCK: Brand: MEDLINE

## (undated) DEVICE — CONTAINER,SPECIMEN,OR STERILE,4OZ: Brand: MEDLINE

## (undated) DEVICE — SUTURE VCRL SZ 2-0 L18IN ABSRB UD CT-1 L36MM 1/2 CIR J839D

## (undated) DEVICE — CONTAINER,SPECIMEN,PNEU TUBE,3OZ,OR STRL: Brand: MEDLINE

## (undated) DEVICE — ZEISS MD MICROSCOPE DRAPE 54 X 120 - GLASS LENS: Brand: OPTICS ONE

## (undated) DEVICE — GAUZE,SPONGE,8"X4",12PLY,XRAY,STRL,LF: Brand: MEDLINE

## (undated) DEVICE — CONMED DISPOSABLE BIPOLAR CABLE, 10' (3.05M): Brand: CONMED

## (undated) DEVICE — SYRINGE MED 10ML LUERLOCK TIP W/O SFTY DISP

## (undated) DEVICE — Device

## (undated) DEVICE — SUTURE VCRL SZ 4-0 L18IN ABSRB UD L16MM PS-4 1/2 CIR PRIM J507G

## (undated) DEVICE — BLADE CLIPPER GEN PURP NS

## (undated) DEVICE — SUTURE NONABSORBABLE MONOFILAMENT 4-0 FS-2 18 IN ETHILON 662H

## (undated) DEVICE — 4-PORT MANIFOLD: Brand: NEPTUNE 2

## (undated) DEVICE — PENCIL ES CRD L10FT HND SWCHING ROCK SWCH W/ EDGE COAT BLDE

## (undated) DEVICE — INTENDED FOR TISSUE SEPARATION, AND OTHER PROCEDURES THAT REQUIRE A SHARP SURGICAL BLADE TO PUNCTURE OR CUT.: Brand: BARD-PARKER ® STAINLESS STEEL BLADES

## (undated) DEVICE — SUTURE ABSORBABLE BRAIDED 2-0 CT-1 27 IN UD VICRYL J259H

## (undated) DEVICE — SUTURE VCRL SZ 2-0 L27IN ABSRB UD L26MM CT-2 1/2 CIR J269H

## (undated) DEVICE — PACK,BASIC,SIRUS,V: Brand: MEDLINE

## (undated) DEVICE — SHEET,DRAPE,53X77,STERILE: Brand: MEDLINE

## (undated) DEVICE — PRESSURE MONITORING SET: Brand: TRUWAVE

## (undated) DEVICE — MARKER SURG SKIN UTIL REGULAR/FINE 2 TIP W/ RUL AND 9 LBL

## (undated) DEVICE — TUBING PRSS 36 M F

## (undated) DEVICE — TOWEL,OR,DSP,ST,BLUE,STD,6/PK,12PK/CS: Brand: MEDLINE

## (undated) DEVICE — MITT SURG PREP L ADH DISPOSABLE

## (undated) DEVICE — 3M™ IOBAN™ 2 ANTIMICROBIAL INCISE DRAPE 6650EZ: Brand: IOBAN™ 2

## (undated) DEVICE — DRESSING PETRO W3XL3IN OIL EMUL N ADH GZ KNIT IMPREG CELOS

## (undated) DEVICE — SUTURE ETHLN SZ 4-0 L18IN NONABSORBABLE BLK L13MM P-3 3/8 699G

## (undated) DEVICE — 3M™ WARMING BLANKET, UPPER BODY, 10 PER CASE, 42268: Brand: BAIR HUGGER™

## (undated) DEVICE — GLOVE SURG SZ 65 THK91MIL LTX FREE SYN POLYISOPRENE

## (undated) DEVICE — APPLICATOR MEDICATED 26 CC SOLUTION HI LT ORNG CHLORAPREP

## (undated) DEVICE — 3M™ TEGADERM™ TRANSPARENT FILM DRESSING FRAME STYLE, 1626W, 4 IN X 4-3/4 IN (10 CM X 12 CM), 50/CT 4CT/CASE: Brand: 3M™ TEGADERM™

## (undated) DEVICE — DRESSING,GAUZE,XEROFORM,CURAD,1"X8",ST: Brand: CURAD

## (undated) DEVICE — DRAIN SURG W0.5XL18IN FLAT GRAV FOR OPN WND PENROSE

## (undated) DEVICE — SUTURE NONABSORBABLE MONOFILAMENT 4-0 P-3 18 IN ETHILON 699H

## (undated) DEVICE — SOLUTION IV IRRIG POUR BRL 0.9% SODIUM CHL 2F7124

## (undated) DEVICE — GLOVE SURG SZ 65 L12IN FNGR THK79MIL GRN LTX FREE

## (undated) DEVICE — GOWN,ECLIPSE,POLYRNF,BRTHSLV,XL,30/CS: Brand: MEDLINE

## (undated) DEVICE — MARKER/RULER SKIN SURG REG TIP

## (undated) DEVICE — SUTURE PROL SZ 2-0 L30IN NONABSORBABLE BLU L26MM SH 1/2 CIR 8833H

## (undated) DEVICE — ADHESIVE SKIN CLSR 0.7ML TOP DERMBND ADV

## (undated) DEVICE — SPONGE GZ W4XL4IN COT 12 PLY TYP VII WVN C FLD DSGN

## (undated) DEVICE — SOLUTION PREP POVIDONE IOD FOR SKIN MUCOUS MEM PRIOR TO

## (undated) DEVICE — SPLINT KNEE L20IN FOR 32IN THGH UNIV FOAM 3 PC DSGN TRIMMED

## (undated) DEVICE — 3M™ STERI-STRIP™ COMPOUND BENZOIN TINCTURE 40 BAGS/CARTON 4 CARTONS/CASE C1544: Brand: 3M™ STERI-STRIP™

## (undated) DEVICE — SUTURE MCRYL SZ 4-0 L27IN ABSRB UD L24MM PS-1 3/8 CIR PRIM Y935H

## (undated) DEVICE — PACK PROCEDURE SURG ORTH BASIC SRHP LF

## (undated) DEVICE — YANKAUER,FLEXIBLE HANDLE,REGLR CAPACITY: Brand: MEDLINE INDUSTRIES, INC.

## (undated) DEVICE — CONTAINER,SPECIMEN,PNEU TUBE,4OZ,OR STRL: Brand: MEDLINE

## (undated) DEVICE — REFLEX ONE, SKIN STAPLER, 35 WIDE: Brand: REFLEX

## (undated) DEVICE — SYRINGE IRRIG 60ML SFT PLIABLE BLB EZ TO GRP 1 HND USE W/

## (undated) DEVICE — BANDAGE,GAUZE,4.5"X4.1YD,STERILE,LF: Brand: MEDLINE

## (undated) DEVICE — SOLUTION IV 500ML 0.9% SOD CHL PH 5 INJ USP VIAFLX PLAS

## (undated) DEVICE — ROYAL SILK SURGICAL GOWN, XXL: Brand: CONVERTORS

## (undated) DEVICE — GLOVE SURG SZ 7 L12IN FNGR THK79MIL GRN LTX FREE

## (undated) DEVICE — BUR RND FLUT AGRSV 5MM

## (undated) DEVICE — SUTURE VCRL SZ 4-0 L27IN ABSRB UD L17MM RB-1 1/2 CIR J214H

## (undated) DEVICE — SUTURE VCRL SZ 3-0 L18IN ABSRB UD L26MM SH 1/2 CIR J864D

## (undated) DEVICE — SUTURE PERMAHAND SZ 2-0 L18IN NONABSORBABLE BLK L26MM SH C012D

## (undated) DEVICE — BANDAGE COMPR ELASTIC 9 FTX4 IN STRL ESMARCH LF

## (undated) DEVICE — CODMAN® SURGICAL PATTIES 1/4" X 1/4" (0.64CM X 0.64CM): Brand: CODMAN®

## (undated) DEVICE — MICRO TIP WIPE: Brand: DEVON

## (undated) DEVICE — GLOVE ORANGE PI 8 1/2   MSG9085

## (undated) DEVICE — TUBING, SUCTION, 3/16" X 10', STRAIGHT: Brand: MEDLINE

## (undated) DEVICE — PREMIUM WET SKIN PREP TRAY CHG: Brand: MEDLINE INDUSTRIES, INC.

## (undated) DEVICE — AGENT HEMSTAT W4XL4IN OXIDIZED REGENERATED CELOS ABSRB SFT

## (undated) DEVICE — SUTURE PROL SZ 5-0 L18IN NONABSORBABLE BLU L13MM P-3 3/8 8698G

## (undated) DEVICE — SEALER ENDOSCP NANO COAT OPN DIV CRV L JAW LIGASURE IMPACT

## (undated) DEVICE — SUTURE VCRL SZ 3-0 L18IN ABSRB VLT L26MM SH 1/2 CIR J774D

## (undated) DEVICE — Z INACTIVE USE 2660664 SOLUTION IRRIG 3000ML 0.9% SOD CHL USP UROMATIC PLAS CONT

## (undated) DEVICE — BANDAGE,GAUZE,BULKEE II,4.5"X4.1YD,STRL: Brand: MEDLINE

## (undated) DEVICE — 1010 S-DRAPE TOWEL DRAPE 10/BX: Brand: STERI-DRAPE™

## (undated) DEVICE — SUTURE VCRL SZ 5-0 L18IN ABSRB UD L13MM P-3 3/8 CIR PRIM J493G

## (undated) DEVICE — TAPE CAST W12.7CMXL3.6M WHT FBRGLS POR WV LTWT STRETCHABLE

## (undated) DEVICE — SUREFIT, DUAL DISPERSIVE ELECTRODE, CONTACT QUALITY MONITOR: Brand: SUREFIT

## (undated) DEVICE — BANDAGE,ELASTIC,ESMARK,STERILE,6"X9',LF: Brand: MEDLINE

## (undated) DEVICE — SUTURE ETHLN SZ 3-0 L30IN NONABSORBABLE BLK FS-1 L24MM 3/8 669H

## (undated) DEVICE — CODMAN® SURGICAL PATTIES 1/2" X1 1/2" (1.27CM X 3.81CM): Brand: CODMAN®

## (undated) DEVICE — GLOVE ORANGE PI 7 1/2   MSG9075

## (undated) DEVICE — GLOVE SURG SZ 6 CRM LTX FREE POLYISOPRENE POLYMER BEAD ANTI

## (undated) DEVICE — SUTURE VCRL SZ 4-0 L27IN ABSRB UD L19MM FS-2 3/8 CIR REV J422H

## (undated) DEVICE — BANDAGE,ELASTIC,ESMARK,STERILE,4"X9',LF: Brand: MEDLINE

## (undated) DEVICE — DRAPE THER FLUID WARMING 66X44 IN FLAT SLUSH DBL DISC ORS

## (undated) DEVICE — PACK,UNIVERSAL,NO GOWNS: Brand: MEDLINE

## (undated) DEVICE — DIFFUSER: Brand: CORE, MAESTRO

## (undated) DEVICE — BAND RUBBER ST

## (undated) DEVICE — 2108 SERIES SAGITTAL FAN BLADE (33.0 X 0.88 X 64.0MM)

## (undated) DEVICE — TUBING, SUCTION, 1/4" X 20', STRAIGHT: Brand: MEDLINE INDUSTRIES, INC.

## (undated) DEVICE — GLOVE SURG SZ 6 THK91MIL LTX FREE SYN POLYISOPRENE ANTI

## (undated) DEVICE — SUTURE VCRL SZ 3-0 L27IN ABSRB UD L36MM CT-1 1/2 CIR J258H

## (undated) DEVICE — GAUZE,SPONGE,4"X4",16PLY,XRAY,STRL,LF: Brand: MEDLINE

## (undated) DEVICE — DRAPE C ARM W36XL30IN RECTANG BND BG AND TAPE

## (undated) DEVICE — THIN OFFSET (9.0 X 0.38 X 25.0MM)

## (undated) DEVICE — PADDING,UNDERCAST,COTTON, 4"X4YD STERILE: Brand: MEDLINE

## (undated) DEVICE — SUTURE VCRL SZ 0 L27IN ABSRB UD L36MM CT-1 1/2 CIR J260H

## (undated) DEVICE — BIPOLAR SEALER 23-112-1 AQM 6.0: Brand: AQUAMANTYS ®

## (undated) DEVICE — SUTURE ETHLN SZ 3-0 L18IN NONABSORBABLE BLK FS-1 L24MM 3/8 663H

## (undated) DEVICE — FOAM BUMP ROUND LARGE: Brand: MEDLINE INDUSTRIES, INC.

## (undated) DEVICE — TUBING, SUCTION, 9/32" X 10', STRAIGHT: Brand: MEDLINE

## (undated) DEVICE — FAN SPRAY KIT: Brand: PULSAVAC®

## (undated) DEVICE — DISPOSABLE STANDARD BIPOLAR FORCEPS, NON-STICK,: Brand: SPETZLER-MALIS

## (undated) DEVICE — GOWN,SIRUS,NON REINFRCD,LARGE,SET IN SL: Brand: MEDLINE

## (undated) DEVICE — TRAY CATH 16FR F INCLUDE LUB DRNGE BG STATLOK STBL DEV

## (undated) DEVICE — BANDAGE COMPR M W6INXL10YD WHT BGE VELC E MTRX HK AND LOOP

## (undated) DEVICE — GOWN,ECLIPSE,POLYRNF,BRTHSLV,L,30/CS: Brand: MEDLINE

## (undated) DEVICE — DRESSING NEG PRSS M W18XH3.3XL12.5CM BLK POLYUR FOAM WND

## (undated) DEVICE — NEEDLE HYPO 25GA L1.5IN BLU POLYPR HUB S STL REG BVL STR

## (undated) DEVICE — ZIMMER® STERILE DISPOSABLE TOURNIQUET CUFF WITH PLC, DUAL PORT, SINGLE BLADDER, 24 IN. (61 CM)

## (undated) DEVICE — TOWEL,OR,DSP,ST,BLUE,DLX,4/PK,20PK/CS: Brand: MEDLINE

## (undated) DEVICE — CONVERTORS STOCKINETTE: Brand: CONVERTORS

## (undated) DEVICE — AGENT HEMSTAT W4XL8IN OXIDIZED REGENERATED CELOS ABSRB

## (undated) DEVICE — UNIT WOUND VACUUM PUMP THERAPY VAC ATS

## (undated) DEVICE — 3M™ STERI-DRAPE™ INSTRUMENT POUCH 1018: Brand: STERI-DRAPE™

## (undated) DEVICE — GLOVE ORANGE PI 7   MSG9070

## (undated) DEVICE — DRESSING,GAUZE,XEROFORM,CURAD,5"X9",ST: Brand: CURAD

## (undated) DEVICE — NEEDLE SPNL L3.5IN PNK HUB S STL REG WALL FIT STYL W/ QNCKE

## (undated) DEVICE — SUTURE VCRL SZ 3-0 L27IN ABSRB UD L24MM FS-1 3/8 CIR REV J442H

## (undated) DEVICE — PREMIUM WET SKIN PREP TRAY: Brand: MEDLINE INDUSTRIES, INC.

## (undated) DEVICE — GLOVE ORANGE PI 8   MSG9080

## (undated) DEVICE — PAD GEN USE BORDERED ADH 14IN 2IN AND 12IN 4IN GZ UNIV ST

## (undated) DEVICE — SOLUTION IV 1000ML 0.9% SOD CHL PH 5 INJ USP VIAFLX PLAS

## (undated) DEVICE — OIL CARTRIDGE: Brand: CORE, MAESTRO

## (undated) DEVICE — MARKER,SKIN,WI/RULER AND LABELS: Brand: MEDLINE

## (undated) DEVICE — 35 ML SYRINGE LUER-LOCK TIP: Brand: MONOJECT

## (undated) DEVICE — PROBE STIM 3 MM FOR PEDCL SCREW DISP

## (undated) DEVICE — SUTURE VCRL SZ 3-0 L18IN ABSRB UD W/O NDL POLYGLACTIN 910 J110T

## (undated) DEVICE — INTENDED FOR TISSUE SEPARATION, AND OTHER PROCEDURES THAT REQUIRE A SHARP SURGICAL BLADE TO PUNCTURE OR CUT.: Brand: BARD-PARKER ® CARBON RIB-BACK BLADES

## (undated) DEVICE — BUR CUT RND FLUT AGRSV 3.0MM

## (undated) DEVICE — PACK PROCEDURE SURG SET UP SRMC

## (undated) DEVICE — GOWN,SIRUS,POLYRNF,BRTHSLV,XLN/XL,20/CS: Brand: MEDLINE

## (undated) DEVICE — SOLUTION IV IRRIG WATER 1000ML POUR BRL 2F7114